# Patient Record
Sex: FEMALE | Race: WHITE | NOT HISPANIC OR LATINO | Employment: OTHER | URBAN - METROPOLITAN AREA
[De-identification: names, ages, dates, MRNs, and addresses within clinical notes are randomized per-mention and may not be internally consistent; named-entity substitution may affect disease eponyms.]

---

## 2017-01-02 DIAGNOSIS — J44.9 CHRONIC OBSTRUCTIVE PULMONARY DISEASE, UNSPECIFIED COPD TYPE (H): Primary | ICD-10-CM

## 2017-01-02 NOTE — TELEPHONE ENCOUNTER
fluticasone (FLOVENT DISKUS) 100 MCG/BLIST AEPB       Last Written Prescription Date: 11/3/16  Last Fill Quantity: 60, # refills: 1    Last Office Visit with G, P or [facility] prescribing provider:  12/23/16   Future Office Visit:       Date of Last Asthma Action Plan Letter:   There are no preventive care reminders to display for this patient.   Asthma Control Test: No flowsheet data found.    Date of Last Spirometry Test:   No results found. However, due to the size of the patient record, not all encounters were searched. Please check Results Review for a complete set of results.

## 2017-01-14 DIAGNOSIS — E11.22 TYPE 2 DIABETES MELLITUS WITH CHRONIC KIDNEY DISEASE, WITHOUT LONG-TERM CURRENT USE OF INSULIN, UNSPECIFIED CKD STAGE (H): Primary | ICD-10-CM

## 2017-01-14 NOTE — TELEPHONE ENCOUNTER
GLIMEPIRIDE 4 MG         Last Written Prescription Date: 3-30-16  Last Fill Quantity: 180, # refills: 2  Last Office Visit with [facility] or [facility] prescribing provider:  12-23-16        BP Readings from Last 3 Encounters:   12/23/16 139/75   12/14/16 130/74   11/19/16 130/74     MICROL       57   1/27/2016  No results found for this basename: microalbumin  CREATININE   Date Value Ref Range Status   11/18/2016 1.25* 0.52 - 1.04 mg/dL Final   ]  GFR ESTIMATE   Date Value Ref Range Status   11/18/2016 42* >60 mL/min/1.7m2 Final     Comment:     Non  GFR Calc   10/27/2016 48* >60 mL/min/1.7m2 Final     Comment:     Non  GFR Calc   09/19/2016 46* >60 mL/min/1.7m2 Final     Comment:     Non  GFR Calc     GFR ESTIMATE IF BLACK   Date Value Ref Range Status   11/18/2016 51* >60 mL/min/1.7m2 Final     Comment:      GFR Calc   10/27/2016 58* >60 mL/min/1.7m2 Final     Comment:      GFR Calc   09/19/2016 56* >60 mL/min/1.7m2 Final     Comment:      GFR Calc     CHOL      180   3/30/2016  HDL       55   3/30/2016  LDL       89   3/30/2016  LDL       95   5/7/2013  TRIG      180   3/30/2016  CHOLHDLRATIO      3.2   5/5/2015  AST       18   7/26/2016  ALT       28   7/26/2016  A1C      7.3   12/23/2016  A1C      8.8   8/10/2016  A1C      7.9   4/5/2016  A1C      9.1   12/21/2015  A1C      8.0   7/7/2015  POTASSIUM   Date Value Ref Range Status   11/18/2016 3.8 3.4 - 5.3 mmol/L Final

## 2017-01-16 RX ORDER — GLIMEPIRIDE 4 MG/1
4 TABLET ORAL 2 TIMES DAILY WITH MEALS
Qty: 180 TABLET | Refills: 1 | Status: SHIPPED | OUTPATIENT
Start: 2017-01-16 | End: 2017-07-13

## 2017-01-16 NOTE — TELEPHONE ENCOUNTER
Last script I can find is 2mg once daily #90. This would have run out. Please clarify with patient what she has been taking, who prescribed it and when it was prescribed. I did refill medication as written, but patient may need advised to change this if it does not represent the proper dose for her.    Jc Martin MD  Family Medicine Physician

## 2017-01-16 NOTE — TELEPHONE ENCOUNTER
This is the dose that pt has been on - see 2/17/16  Last office visit for diabetes 12/16  Opal To RN

## 2017-01-16 NOTE — TELEPHONE ENCOUNTER
Routing refill request to provider for review/approval because:  Labs out of range:  Abnormal creatinine  Labs not current:  microalbumin  Patient needs to be seen because:  Over due for 6 month check

## 2017-01-20 ENCOUNTER — OFFICE VISIT (OUTPATIENT)
Dept: SURGERY | Facility: CLINIC | Age: 72
End: 2017-01-20

## 2017-01-20 VITALS
BODY MASS INDEX: 45.99 KG/M2 | HEIGHT: 67 IN | OXYGEN SATURATION: 92 % | HEART RATE: 86 BPM | WEIGHT: 293 LBS | DIASTOLIC BLOOD PRESSURE: 72 MMHG | SYSTOLIC BLOOD PRESSURE: 158 MMHG

## 2017-01-20 DIAGNOSIS — F07.81 POST CONCUSSION SYNDROME: Primary | ICD-10-CM

## 2017-01-20 ASSESSMENT — ANXIETY QUESTIONNAIRES
7. FEELING AFRAID AS IF SOMETHING AWFUL MIGHT HAPPEN: SEVERAL DAYS
5. BEING SO RESTLESS THAT IT IS HARD TO SIT STILL: NOT AT ALL
2. NOT BEING ABLE TO STOP OR CONTROL WORRYING: MORE THAN HALF THE DAYS
6. BECOMING EASILY ANNOYED OR IRRITABLE: MORE THAN HALF THE DAYS
IF YOU CHECKED OFF ANY PROBLEMS ON THIS QUESTIONNAIRE, HOW DIFFICULT HAVE THESE PROBLEMS MADE IT FOR YOU TO DO YOUR WORK, TAKE CARE OF THINGS AT HOME, OR GET ALONG WITH OTHER PEOPLE: VERY DIFFICULT
GAD7 TOTAL SCORE: 10
3. WORRYING TOO MUCH ABOUT DIFFERENT THINGS: NEARLY EVERY DAY
1. FEELING NERVOUS, ANXIOUS, OR ON EDGE: SEVERAL DAYS

## 2017-01-20 ASSESSMENT — PATIENT HEALTH QUESTIONNAIRE - PHQ9: 5. POOR APPETITE OR OVEREATING: SEVERAL DAYS

## 2017-01-20 ASSESSMENT — PAIN SCALES - GENERAL: PAINLEVEL: SEVERE PAIN (7)
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
## 2017-01-20 NOTE — MR AVS SNAPSHOT
After Visit Summary   1/20/2017    Mariel Ribeiro    MRN: [id number]           Patient Information     Date Of Birth          1945        Visit Information        Provider Department      1/20/2017 8:00 AM Jose aMnuel Razo NP General Surgery        Today's Diagnoses     Post concussion syndrome    -  1       Care Instructions    GENERAL ADVICE  ~~ Avoid activities that trigger your symptoms.  Stop your activity  as soon as you feel the symptoms coming on, or earlier if you feel your symptoms coming on.  ~~ Do not try to push through symptoms or they may get worse or take longer to go away.  ~~ Allow yourself more time for activity   ~~ Drink plenty of water throughout the day (8-10 glasses per day)  ~~ Write things down.  At home, at work, whenever there is something that you should remember, even it is simple.    SLEEP  ~~ You need to sleep.   ~~ If your headaches are keeping you awake take your ibuprofen and tylenol right before bedtime~  ~~ Attempt for 8 hours of sleep a night. If you are having issues sleeping at night, avoid napping during the day.  ~~ Rest (eyes closed, dark room)  as frequently as needed to let your symptoms go away      HEADACHE  ~ Take your tylenol (1000 mg) three times a day for now, do not wait until you have a headache~  ~ Take your ibuprofen (400 mg) three times a day now, do not wait until you have a headache (take with food)~  Light sensitivities:   ~~ Avoid florescent lighting when possible  ~~ Always wear sunglasses outside and even wear them indoors if it is helpful.  ~~ No night driving  Sound sensitivities:  ~ Avoid crowded areas  ~ Avoid multiple conversations at the same time around you  ~ Avoid loud music or loud sounds    NECK PAIN  ~ Ice or Heat are good~  ~ Massage is ok if it doesn't trigger more symptoms~  ~ Gentle stretches are good    DIZZINESS  ~ Keep a chair at the side of the bed to help get steady prior to getting up and going to the  bathroom  ~ No climbing heights or using ladders or stepstools.    FATIGUE  ~ Go on walks, clean the house, gentle activities, but when you get fatigued, stop and rest.     ANXIETY OR MOOD SWINGS:  ~~ If you are irritable or anxious, take a break in a quiet room.                             Follow-ups after your visit        Additional Services     CONCUSSION  REFERRAL       [facility] is referring you to the Concussion  service at [facility].      The  Representative will assist you in the coordination of your concussion care as prescribed by your physician.    The  Representative will contact you within one business day, or you may contact the  Representative at [phone].    Referral Options:  Rehab Services: Occupational Therapy, Evaluate and Treat  Physical therapy eval and treat  Non sports related- follow up 4 weeks.     Coverage of these services are subject to the terms and limitations of your health insurance plan.  Please call member services at your health plan with any benefit or coverage questions.     If X-rays, CT or MRI's have been performed, please contact the facility where they were done, to arrange for  prior to your scheduled appointment.  Please bring this referral request to your appointment and present it to your specialist.            NEUROLOGY ADULT REFERRAL       Your provider has referred you to: [facility]: Neurology Clinic - [place] [phone]   [email or web address]  Neuro ophthalmology     Reason for Referral: Consult    Please be aware that coverage of these services is subject to the terms and limitations of your health insurance plan.  Call member services at your health plan with any benefit or coverage questions.      Please bring the following with you to your appointment:    (1) Any X-Rays, CTs or MRIs which have been performed.  Contact the facility  "where they were done to arrange for  prior to your scheduled appointment.    (2) List of current medications  (3) This referral request   (4) Any documents/labs given to you for this referral                  Who to contact     Please call your clinic at [phone] to:    Ask questions about your health    Make or cancel appointments    Discuss your medicines    Learn about your test results    Speak to your doctor   If you have compliments or concerns about an experience at your clinic, or if you wish to file a complaint, please contact [facility] Patient Relations at [phone] or email us at [email or web address]         Additional Information About Your Visit        [employer] Information     [employer] gives you secure access to your electronic health record. If you see a primary care provider, you can also send messages to your care team and make appointments. If you have questions, please call your primary care clinic.  If you do not have a primary care provider, please call [phone] and they will assist you.      [employer] is an electronic gateway that provides easy, online access to your medical records. With [employer], you can request a clinic appointment, read your test results, renew a prescription or communicate with your care team.     To access your existing account, please contact your [facility] or call [phone] for assistance.        Care EveryWhere ID     This is your Care EveryWhere ID. This could be used by other organizations to access your [place] medical records  [id number]        Your Vitals Were     Pulse Height BMI (Body Mass Index) Pulse Oximetry          86 1.689 m (5' 6.5\") 49.61 kg/m2 92%         Blood Pressure from Last 3 Encounters:   01/20/17 158/72   12/23/16 139/75   12/14/16 130/74    Weight from Last 3 Encounters:   01/20/17 141.522 kg (312 lb)   12/23/16 142.429 kg (314 lb)   12/14/16 142.883 kg " (315 lb)              We Performed the Following     CONCUSSION  REFERRAL     NEUROLOGY ADULT REFERRAL          Today's Medication Changes          These changes are accurate as of: 1/20/17  9:08 AM.  If you have any questions, ask your nurse or doctor.               These medicines have changed or have updated prescriptions.        Dose/Directions    polyethylene glycol powder   Commonly known as:  MIRALAX   This may have changed:    - how much to take  - how to take this  - when to take this  - reasons to take this  - additional instructions   Used for:  Drug-induced constipation        Take 17 grams by mouth once to twice daily.   Quantity:  510 g   Refills:  1                Primary Care Provider Office Phone # Fax #    Rafaela William [phone]       [facility] [address]  [place] [address]        Thank you!     Thank you for choosing GENERAL SURGERY  for your care. Our goal is always to provide you with excellent care. Hearing back from our patients is one way we can continue to improve our services. Please take a few minutes to complete the written survey that you may receive in the mail after your visit with us. Thank you!             Your Updated Medication List - Protect others around you: Learn how to safely use, store and throw away your medicines at www.disposemymeds.org.          This list is accurate as of: 1/20/17  9:08 AM.  Always use your most recent med list.                   Brand Name Dispense Instructions for use    albuterol 108 (90 BASE) MCG/ACT Inhaler    albuterol    1 Inhaler    INHALE 1 TO 2 PUFFS EVERY 4 TO 6 HOURS AS NEEDED       aspirin  MG EC tablet     30 tablet    Take 1 tablet by mouth daily.       atorvastatin 40 MG tablet    LIPITOR    90 tablet    Take 1 tablet (40 mg) by mouth daily       blood glucose monitoring test strip    no brand specified    200 each    1 strip by In Vitro route 2 times daily Strips per  patient's preference       EPINEPHrine 0.3 MG/0.3ML injection     0.6 mL    Inject 0.3 mLs (0.3 mg) into the muscle once as needed for anaphylaxis       escitalopram 20 MG tablet    LEXAPRO    90 tablet    Take 1 tablet (20 mg) by mouth every morning       febuxostat 40 MG Tabs tablet    ULORIC    90 tablet    Take 1 tablet (40 mg) by mouth every evening       fluticasone 100 MCG/BLIST Aepb    FLOVENT DISKUS    60 Inhaler    Inhale 1 puff into the lungs every 12 hours       folic acid 800 MCG Tabs      Take 800 mcg by mouth daily.       furosemide 40 MG tablet    LASIX    120 tablet    Take 2 tablets (80 mg) by mouth 2 times daily       glimepiride 4 MG tablet    AMARYL    180 tablet    Take 1 tablet (4 mg) by mouth 2 times daily (with meals)       GUAIFENESIN  MG Tbcr     30    Take 600 mg by mouth twice daily       levothyroxine 200 MCG tablet    SYNTHROID/LEVOTHROID    90 tablet    Take 1 tablet (200 mcg) by mouth every morning Take one tablet daily *WINSOME*       LIFESCAN FINEPOINT LANCETS Misc     100 each    Use to test blood sugars 2 times daily or as directed.       medroxyPROGESTERone 10 MG tablet    PROVERA    90 tablet    Take 1 tablet (10 mg) by mouth daily       metFORMIN 500 MG 24 hr tablet    GLUCOPHAGE-XR    360 tablet    2 tabs after breakfast and supper       Miconazole Nitrate 2 % Powd     100 g    Apply to rash on chest three times daily.  Continue for one week after the rash has resolved.       MORPHINE SULFATE PO      Take 15 mg by mouth 3 times daily       niacin 500 MG CR tablet    NIASPAN    180 tablet    Take 1 tablet (500 mg) by mouth 2 times daily       nitroglycerin 0.4 MG sublingual tablet    NITROSTAT    25 tablet    Place 1 tablet (0.4 mg) under the tongue every 5 minutes as needed for chest pain As needed for chest pain.       ONE TOUCH ULTRA (DEVICES) Misc     1    test blood sugar BID       polyethylene glycol powder    MIRALAX    510 g    Take 17 grams by mouth once to twice  daily.       potassium chloride SA 10 MEQ CR tablet    K-DUR/KLOR-CON M    120 tablet    Take 2 tablets (20 mEq) by mouth 2 times daily       pregabalin 100 MG capsule    LYRICA    180 capsule    Take 1 capsule (100 mg) by mouth 2 times daily       senna-docusate 8.6-50 MG per tablet    SENOKOT-S;PERICOLACE    60 tablet    Take 1-2 tablets by mouth 2 times daily as needed for constipation       tiZANidine 4 MG capsule    ZANAFLEX    90 capsule    1 TABLET DAILY at bed time       traZODone 50 MG tablet    DESYREL    90 tablet    Take 3 tablets (150 mg) by mouth At Bedtime       vitamin D3 5000 UNIT/ML Liqd      Take 10,000 Units by mouth daily

## 2017-01-20 NOTE — NURSING NOTE
"Chief Complaint   Patient presents with     Consult       Filed Vitals:    01/20/17 0813   BP: 158/72   Pulse: 86   Height: 1.689 m (5' 6.5\")   Weight: 141.522 kg (312 lb)   SpO2: 92%       Body mass index is 49.61 kg/(m^2).                              "
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
## 2017-01-20 NOTE — PATIENT INSTRUCTIONS
GENERAL ADVICE  ~~ Avoid activities that trigger your symptoms.  Stop your activity  as soon as you feel the symptoms coming on, or earlier if you feel your symptoms coming on.  ~~ Do not try to push through symptoms or they may get worse or take longer to go away.  ~~ Allow yourself more time for activity   ~~ Drink plenty of water throughout the day (8-10 glasses per day)  ~~ Write things down.  At home, at work, whenever there is something that you should remember, even it is simple.    SLEEP  ~~ You need to sleep.   ~~ If your headaches are keeping you awake take your ibuprofen and tylenol right before bedtime~  ~~ Attempt for 8 hours of sleep a night. If you are having issues sleeping at night, avoid napping during the day.  ~~ Rest (eyes closed, dark room)  as frequently as needed to let your symptoms go away      HEADACHE  ~ Take your tylenol (1000 mg) three times a day for now, do not wait until you have a headache~  ~ Take your ibuprofen (400 mg) three times a day now, do not wait until you have a headache (take with food)~  Light sensitivities:   ~~ Avoid florescent lighting when possible  ~~ Always wear sunglasses outside and even wear them indoors if it is helpful.  ~~ No night driving  Sound sensitivities:  ~ Avoid crowded areas  ~ Avoid multiple conversations at the same time around you  ~ Avoid loud music or loud sounds    NECK PAIN  ~ Ice or Heat are good~  ~ Massage is ok if it doesn't trigger more symptoms~  ~ Gentle stretches are good    DIZZINESS  ~ Keep a chair at the side of the bed to help get steady prior to getting up and going to the bathroom  ~ No climbing heights or using ladders or stepstools.    FATIGUE  ~ Go on walks, clean the house, gentle activities, but when you get fatigued, stop and rest.     ANXIETY OR MOOD SWINGS:  ~~ If you are irritable or anxious, take a break in a quiet room.
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
## 2017-01-21 ASSESSMENT — ANXIETY QUESTIONNAIRES: GAD7 TOTAL SCORE: 10

## 2017-01-21 ASSESSMENT — PATIENT HEALTH QUESTIONNAIRE - PHQ9: SUM OF ALL RESPONSES TO PHQ QUESTIONS 1-9: 21

## 2017-01-24 DIAGNOSIS — G47.00 INSOMNIA, UNSPECIFIED: Primary | ICD-10-CM

## 2017-01-24 NOTE — TELEPHONE ENCOUNTER
TRAZODONE 50 MG       Last Written Prescription Date: 5-16-16  Last Fill Quantity: 90; # refills: 7  Last Office Visit with [facility], [facility] or [facility] prescribing provider:  12-23-16        Last PHQ-9 score on record=   PHQ-9 SCORE 1/20/2017   Total Score -   Total Score MyChart -   Total Score 21       AST       18   7/26/2016  ALT       28   7/26/2016

## 2017-01-25 RX ORDER — TRAZODONE HYDROCHLORIDE 50 MG/1
150 TABLET, FILM COATED ORAL AT BEDTIME
Qty: 90 TABLET | Refills: 7 | Status: SHIPPED | OUTPATIENT
Start: 2017-01-25 | End: 2017-11-15

## 2017-01-30 ENCOUNTER — HOSPITAL ENCOUNTER (OUTPATIENT)
Dept: PHYSICAL THERAPY | Facility: CLINIC | Age: 72
Setting detail: THERAPIES SERIES
End: 2017-01-30
Attending: NURSE PRACTITIONER
Payer: MEDICARE

## 2017-01-30 ENCOUNTER — HOSPITAL ENCOUNTER (OUTPATIENT)
Dept: OCCUPATIONAL THERAPY | Facility: CLINIC | Age: 72
Setting detail: THERAPIES SERIES
End: 2017-01-30
Attending: NURSE PRACTITIONER
Payer: MEDICARE

## 2017-01-30 DIAGNOSIS — F07.81 POST CONCUSSION SYNDROME: Primary | ICD-10-CM

## 2017-01-30 PROCEDURE — G8978 MOBILITY CURRENT STATUS: HCPCS | Mod: GP,CL | Performed by: PHYSICAL THERAPIST

## 2017-01-30 PROCEDURE — 97163 PT EVAL HIGH COMPLEX 45 MIN: CPT | Mod: GP | Performed by: PHYSICAL THERAPIST

## 2017-01-30 PROCEDURE — G8987 SELF CARE CURRENT STATUS: HCPCS | Mod: GO,CK | Performed by: OCCUPATIONAL THERAPIST

## 2017-01-30 PROCEDURE — G8988 SELF CARE GOAL STATUS: HCPCS | Mod: GO,CJ | Performed by: OCCUPATIONAL THERAPIST

## 2017-01-30 PROCEDURE — 97535 SELF CARE MNGMENT TRAINING: CPT | Mod: GO | Performed by: OCCUPATIONAL THERAPIST

## 2017-01-30 PROCEDURE — 40000767 ZZHC STATISTIC PT CONCUSSION VISIT: Performed by: PHYSICAL THERAPIST

## 2017-01-30 PROCEDURE — 97167 OT EVAL HIGH COMPLEX 60 MIN: CPT | Mod: GO,59 | Performed by: OCCUPATIONAL THERAPIST

## 2017-01-30 PROCEDURE — 40000768 ZZHC STATISTIC OT CONCUSSION VISIT: Performed by: OCCUPATIONAL THERAPIST

## 2017-01-30 PROCEDURE — G8979 MOBILITY GOAL STATUS: HCPCS | Mod: GP,CK | Performed by: PHYSICAL THERAPIST

## 2017-01-30 ASSESSMENT — ACTIVITIES OF DAILY LIVING (ADL)
IADL_QUICK_ADDS: MEAL PLANNING/PREPARATION;HOME/FINANCIAL/MANAGEMENT;COMMUNICATION/COMPUTER USE;COMMUNITY MOBILITY;CARE OF OTHERS
CARE_OF_OTHERS: DOG

## 2017-01-30 NOTE — DISCHARGE INSTRUCTIONS
Call your primary care doctor to check out your stomach.    Call your orthopedist to check out your right knee replacement      Balance homework  1. DON'T BEND OVER - USE YOUR REACHER.    2. Walk in your house with your cane for 3 mins, 2x per day.

## 2017-01-30 NOTE — IP AVS SNAPSHOT
MRN:[id number]                      After Visit Summary   1/30/2017    Mariel Ribeiro    MRN: [id number]           Visit Information        Provider Department      1/30/2017  2:30 PM Liudmila Harper, PT [facility], [place], Physical Therapy - Outpatient          Further instructions from your care team       Call your primary care doctor to check out your stomach.    Call your orthopedist to check out your right knee replacement      Balance homework  1. DON'T BEND OVER - USE YOUR REACHER.    2. Walk in your house with your cane for 3 mins, 2x per day.            [employer] Information     [employer] gives you secure access to your electronic health record. If you see a primary care provider, you can also send messages to your care team and make appointments. If you have questions, please call your primary care clinic.  If you do not have a primary care provider, please call [phone] and they will assist you.        Care EveryWhere ID     This is your Care EveryWhere ID. This could be used by other organizations to access your [place] medical records  [id number]

## 2017-02-01 NOTE — PROGRESS NOTES
[facility]          OUTPATIENT OCCUPATIONAL THERAPY  EVALUATION  PLAN OF TREATMENT FOR OUTPATIENT REHABILITATION  (COMPLETE FOR INITIAL CLAIMS ONLY)  Patient's Last Name, First Name, M.I.  YOB: 1945  Mariel Ribeiro                        Provider's Name  [facility] Medical Record No.  [id number]                               Onset Date:     01/20/17 (order)   Start of Care Date:     01/30/17   Type:     ___PT   _X_OT   ___SLP Medical Diagnosis:     Post concussion syndrome                          OT Diagnosis:     concussion symptom severity Visits from SOC:  1   _________________________________________________________________________________  Plan of Treatment/Functional Goals:  ADL training, Cognitive skills, ROM, Self care/Home management, Therapeutic activities    Goals  Goal Identifier: Symptom mgmt  Goal Description: Patient will verbalize 2-3 strategies to decrease concussion symptom assessment score by 20 points in order to return to her ability to carry out IADL tasks driving, home cares, and ADLS, etc...  Target Date: 05/01/17     Goal Identifier: Memory  Goal Description: Patient will demonstrate improved memory skills by completing short-term memory tasks in occupational therapy with 85% accuracy using compensatory strategies prn.   Target Date: 05/01/17     Goal Identifier: Dynavision  Goal Description: Patient will demonstrate WFLs UE motor and visual reaction time for return to independent community mobility (crossing the street, driving, etc.) by scoring WNLs on all modes of the Dynavision.   Target Date: 05/01/17    Therapy Frequency: up to 10 sessions as needed     Predicted Duration of Therapy Intervention (days/wks): 5/1/17  Yumiko Ayala, OTR/L, ATP       I CERTIFY THE NEED FOR THESE SERVICES FURNISHED UNDER        THIS PLAN OF TREATMENT AND WHILE UNDER  MY CARE     (Physician co-signature of this document indicates review and certification of the therapy plan).             Certification date from: 01/30/17, Certification date to: 05/01/17               Referring Physician: Jose Manuel Razo     Initial Assessment        See Epic Evaluation      Start Of Care Date: 01/30/17
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
## 2017-02-01 NOTE — PROGRESS NOTES
[facility]        OUTPATIENT PHYSICAL THERAPY FUNCTIONAL EVALUATION  PLAN OF TREATMENT FOR OUTPATIENT REHABILITATION  (COMPLETE FOR INITIAL CLAIMS ONLY)  Patient's Last Name, First Name, M.I.  YOB: 1945  Mariel Ribeiro     Provider's Name   [facility]   Medical Record No.  [id number]     Start of Care Date:  01/30/17   Onset Date:  11/11/16   Type:     _X__PT   ____OT  ____SLP Medical Diagnosis:  post concussion syndrome     PT Diagnosis:  post-concussive syndrome Visits from SOC:  1                              __________________________________________________________________________________  Plan of Treatment/Functional Goals:  balance training, neuromuscular re-education, ROM, strengthening, stretching, transfer training, visual perception, gait training           GOALS  TUG  Pt to demo low risk for falls as evidenced by TUG <13.5 secs with SEC.  04/29/17    25' walk  Pt demo improved efficiency and safety with gait via 25' walk <18.4 sec with SEC (20% improved).  04/29/17    Community mobility  Pt demo improved activity tolerance via ability to amb >500' with 4WW for safe community mobility to allow for basic shopping on her own.  04/29/17    HEP  Pt demo indep with HEP for balance, LE strength, endurance for reduction of concussion sx and ongoing wellness.  04/29/17    CSA  Pt demo improved concussion sx to allow for greater engagement in life via CSA <38/90.  04/29/17          Therapy Frequency:  2 times/Week (ideally - but likley limited to 1x per week due to transport)   Predicted Duration of Therapy Intervention:  90 day    Liudmila Harper, PT                                    I CERTIFY THE NEED FOR THESE SERVICES FURNISHED UNDER        THIS PLAN OF TREATMENT AND WHILE UNDER MY CARE     (Physician co-signature of this document indicates review and  certification of the therapy plan).                Certification Date From:  01/30/17   Certification Date To:  04/29/17    Referring Provider:  Jose Manuel Razo NP    Initial Assessment  See Epic Evaluation- Start of Care Date: 01/30/17

## 2017-02-10 ENCOUNTER — HOSPITAL ENCOUNTER (OUTPATIENT)
Dept: PHYSICAL THERAPY | Facility: CLINIC | Age: 72
Setting detail: THERAPIES SERIES
End: 2017-02-10
Attending: NURSE PRACTITIONER
Payer: MEDICARE

## 2017-02-10 DIAGNOSIS — E79.0 HYPERURICEMIA: Primary | ICD-10-CM

## 2017-02-10 PROCEDURE — 97110 THERAPEUTIC EXERCISES: CPT | Mod: GP | Performed by: PHYSICAL THERAPIST

## 2017-02-10 PROCEDURE — 97112 NEUROMUSCULAR REEDUCATION: CPT | Mod: GP | Performed by: PHYSICAL THERAPIST

## 2017-02-10 PROCEDURE — 40000767 ZZHC STATISTIC PT CONCUSSION VISIT: Performed by: PHYSICAL THERAPIST

## 2017-02-10 RX ORDER — FEBUXOSTAT 40 MG/1
40 TABLET, FILM COATED ORAL EVERY EVENING
Qty: 90 TABLET | Refills: 3 | Status: SHIPPED | OUTPATIENT
Start: 2017-02-10 | End: 2018-03-22

## 2017-02-10 NOTE — TELEPHONE ENCOUNTER
Routing refill request to provider for review/approval because:  Last creatinine labs were abnormal, no alt or ast done in the last year

## 2017-02-10 NOTE — TELEPHONE ENCOUNTER
ULORIC 40 MG       Last Written Prescription Date: 11-23-16  Last Fill Quantity: 90, # refills: 0  Last Office Visit with [facility], [facility] or [facility] prescribing provider:  12-23-16        URIC ACID   Date Value Ref Range Status   05/07/2013 6.0 2.5 - 7.5 mg/dL Final   ]  CREATININE   Date Value Ref Range Status   11/18/2016 1.25* 0.52 - 1.04 mg/dL Final   ]  WBC      8.2   11/18/2016  RBC     4.71   11/18/2016  HGB     12.9   11/18/2016  HCT     39.9   11/18/2016  No components found with this name: mct  MCV       85   11/18/2016  MCH     27.4   11/18/2016  MCHC     32.3   11/18/2016  RDW     13.2   11/18/2016  PLT      134   11/18/2016  AST       18   7/26/2016  ALT       28   7/26/2016

## 2017-02-10 NOTE — DISCHARGE INSTRUCTIONS
Keep walking 2x per day - 4 mins      Standing at kitchen sink, chair behind you   - Stand feet as close together as you can while balancing without holding on.  - GOAL 30 secs, 3x

## 2017-02-10 NOTE — IP AVS SNAPSHOT
MRN:[id number]                      After Visit Summary   2/10/2017    Mariel Ribeiro    MRN: [id number]           Visit Information        Provider Department      2/10/2017  2:15 PM Liudmila Harper, PT [facility], [place], Physical Therapy - Outpatient        Your next 10 appointments already scheduled     Feb 16, 2017  2:15 PM   Treatment 45 with Liudmila Harper, PT   [facility], [place], Physical Therapy - Outpatient ([facility])    [address]  [place] [address]   [phone]            Feb 16, 2017  3:00 PM   Treatment 60 with Yumiko Ayala, OT   [facility], [place], Occupational Therapy - Outpatient ([facility])    [address]  [place] [address]   [phone]            Feb 23, 2017  1:00 PM   Treatment 45 with Liudmila Harper, PT   [facility], [place], Physical Therapy - Outpatient ([facility])    [address]  [place] [address]   [phone]            Mar 02, 2017  1:00 PM   Treatment 45 with Liudmila Harper, PT   [facility], [place], Physical Therapy - Outpatient ([facility])    [address]  [place] [address]   [phone]            Mar 02, 2017  1:45 PM   Treatment 60 with Yumiko Ayala, OT   [facility], [place], Occupational Therapy - Outpatient ([facility])    [address]  [place] [address]   [phone]            Mar 09, 2017  1:00 PM   Treatment 45 with Liudmila Harper, PT   [facility], [place], Physical Therapy - Outpatient ([facility])    [address]  [place] [address]   [phone]            Mar 16, 2017  2:00 PM   Treatment 60 with Yumiko Ayala, OT   [facility], [place], Occupational Therapy - Outpatient ([facility])    [address]  [place] [address]   [phone]            Mar 16, 2017  3:00 PM   Treatment 45 with Liudmila Harper PT   [facility], [place], Physical Therapy - Outpatient ([facility])    [address]  [place] [address]   [phone]                Further instructions from your care team       Keep walking 2x per day - 4 mins      Standing at kitchen sink, chair behind you   - Stand feet as close together as you can while balancing without holding on.  - GOAL 30 secs, 3x        MyChart Information     [employer] gives you secure access to your electronic health record. If you see a primary care provider, you can also send messages to your care team and make appointments. If you have questions, please call your primary care clinic.  If you do not have a primary care provider, please call [phone] and they will assist you.        Care EveryWhere ID     This is your Care EveryWhere ID. This could be used by other organizations to access your [place] medical records  [id number]

## 2017-02-16 ENCOUNTER — HOSPITAL ENCOUNTER (OUTPATIENT)
Dept: PHYSICAL THERAPY | Facility: CLINIC | Age: 72
Setting detail: THERAPIES SERIES
End: 2017-02-16
Attending: NURSE PRACTITIONER
Payer: MEDICARE

## 2017-02-16 ENCOUNTER — HOSPITAL ENCOUNTER (OUTPATIENT)
Dept: OCCUPATIONAL THERAPY | Facility: CLINIC | Age: 72
Setting detail: THERAPIES SERIES
End: 2017-02-16
Attending: NURSE PRACTITIONER
Payer: MEDICARE

## 2017-02-16 PROCEDURE — 97140 MANUAL THERAPY 1/> REGIONS: CPT | Mod: GP | Performed by: PHYSICAL THERAPIST

## 2017-02-16 PROCEDURE — 40000768 ZZHC STATISTIC OT CONCUSSION VISIT: Performed by: OCCUPATIONAL THERAPIST

## 2017-02-16 PROCEDURE — 40000767 ZZHC STATISTIC PT CONCUSSION VISIT: Performed by: PHYSICAL THERAPIST

## 2017-02-16 PROCEDURE — 97535 SELF CARE MNGMENT TRAINING: CPT | Mod: GO | Performed by: OCCUPATIONAL THERAPIST

## 2017-02-16 PROCEDURE — 97112 NEUROMUSCULAR REEDUCATION: CPT | Mod: GP | Performed by: PHYSICAL THERAPIST

## 2017-02-16 NOTE — IP AVS SNAPSHOT
MRN:[id number]                      After Visit Summary   2/16/2017    Mariel Ribeiro    MRN: [id number]           Visit Information        Provider Department      2/16/2017  2:15 PM Liudmila Harper, PT [facility], [place], Physical Therapy - Outpatient        Your next 10 appointments already scheduled     Feb 23, 2017  1:00 PM CST   Treatment 45 with Liudmila Harper, PT   [facility], [place], Physical Therapy - Outpatient ([facility])    [address]  [place] [address]   [phone]            Mar 02, 2017  1:00 PM CST   Treatment 45 with Liudmila Harper, PT   [facility], [place], Physical Therapy - Outpatient ([facility])    [address]  [place] [address]   [phone]            Mar 02, 2017  1:45 PM CST   Treatment 60 with Yumiko Ayala, OT   [facility], [place], Occupational Therapy - Outpatient ([facility])    [address]  [place] [address]   [phone]            Mar 09, 2017  1:00 PM CST   Treatment 45 with Liudmila Harper, PT   [facility], [place], Physical Therapy - Outpatient ([facility])    [address]  [place] [address]   [phone]            Mar 16, 2017  2:00 PM CDT   Treatment 60 with Yumiko Ayala, OT   [facility], [place], Occupational Therapy - Outpatient ([facility])    [address]  [place] [address]   [phone]            Mar 16, 2017  3:00 PM CDT   Treatment 45 with Liudmila Harper, PT   [facility], [place], Physical Therapy - Outpatient ([facility])    [address]  [place] [address]   [phone]                Further instructions from your care team       Make an appointment with primary care doctor about  your COPD.      Standing balance - get feet as close together as possible.    While laying down - neck range of motion - rotation left and right as pain free as possible.    Keep walking 3-4 mins as able with your breathing, 2x per day.    [employer] Information     [employer] gives you secure access to your electronic health record. If you see a primary care provider, you can also send messages to your care team and make appointments. If you have questions, please call your primary care clinic.  If you do not have a primary care provider, please call [phone] and they will assist you.        Care EveryWhere ID     This is your Care EveryWhere ID. This could be used by other organizations to access your [place] medical records  [id number]

## 2017-02-16 NOTE — DISCHARGE INSTRUCTIONS
Make an appointment with primary care doctor about your COPD.      Standing balance - get feet as close together as possible.    While laying down - neck range of motion - rotation left and right as pain free as possible.    Keep walking 3-4 mins as able with your breathing, 2x per day.

## 2017-02-17 DIAGNOSIS — E03.9 HYPOTHYROIDISM, UNSPECIFIED TYPE: ICD-10-CM

## 2017-02-17 RX ORDER — LEVOTHYROXINE SODIUM 200 UG/1
200 TABLET ORAL EVERY MORNING
Qty: 30 TABLET | Refills: 0 | Status: SHIPPED
Start: 2017-02-17 | End: 2017-03-10

## 2017-02-17 NOTE — TELEPHONE ENCOUNTER
Left message on answering machine that medication was approved and sent to pharmacy and that a lab only appointment must be scheduled prior to additional refills given.   Shayna Christine

## 2017-02-17 NOTE — TELEPHONE ENCOUNTER
Medication is being filled for 1 time refill only due to:  Patient needs labs TSH. Future labs ordered TSH.     Signed Prescriptions:                        Disp   Refills    levothyroxine (SYNTHROID/LEVOTHROID) 200 M*30 tab*0        Sig: Take 1 tablet (200 mcg) by mouth every morning Take           one tablet daily *WINSOME*  PLEASE SCHEDULE A ;AB           ONLY APPOINTMENT TO RECEIVE FUTURE REFILLS           [phone]  Authorizing Provider: INO JACOB  Ordering User: ABIMBOLA ANN      Routing to  Reception - please ask patient to schedule lab only for recheck of thyroid    Thank you,  Abimbola Ann RN

## 2017-02-17 NOTE — TELEPHONE ENCOUNTER
SYNTHROID 200 MCG     Last Written Prescription Date: 5-17-16  Last Quantity: 90, # refills: 2  Last Office Visit with [facility] or [facility] prescribing provider: 12-23-16        TSH   Date Value Ref Range Status   03/30/2016 1.06 0.40 - 4.00 mU/L Final

## 2017-02-23 ENCOUNTER — HOSPITAL ENCOUNTER (OUTPATIENT)
Dept: PHYSICAL THERAPY | Facility: CLINIC | Age: 72
Setting detail: THERAPIES SERIES
End: 2017-02-23
Attending: NURSE PRACTITIONER
Payer: MEDICARE

## 2017-02-23 PROCEDURE — 40000767 ZZHC STATISTIC PT CONCUSSION VISIT: Performed by: PHYSICAL THERAPIST

## 2017-02-23 PROCEDURE — 97530 THERAPEUTIC ACTIVITIES: CPT | Mod: GP | Performed by: PHYSICAL THERAPIST

## 2017-02-23 NOTE — DISCHARGE INSTRUCTIONS
Bring in your TENS machine to your next PT appt.      Make an appt with your primary care doctor ASAP about the chest pain on right side, as well as how you feel when you sit up from laying down.

## 2017-02-23 NOTE — IP AVS SNAPSHOT
MRN:[id number]                      After Visit Summary   2/23/2017    Mariel Ribeiro    MRN: [id number]           Visit Information        Provider Department      2/23/2017  1:00 PM Liudmila Harper, PT [facility], [place], Physical Therapy - Outpatient        Your next 10 appointments already scheduled     Mar 02, 2017  1:00 PM CST   Treatment 45 with Liudmila Harper, PT   [facility], [place], Physical Therapy - Outpatient ([facility])    [address]  [place] [address]   [phone]            Mar 02, 2017  1:45 PM CST   Treatment 60 with Yumiko Ayala, OT   [facility], [place], Occupational Therapy - Outpatient ([facility])    [address]  [place] [address]   [phone]            Mar 09, 2017  1:00 PM CST   Treatment 45 with Liudmila Harper, PT   [facility], [place], Physical Therapy - Outpatient ([facility])    [address]  [place] [address]   [phone]            Mar 16, 2017  2:00 PM CDT   Treatment 60 with Yumiko Ayala, OT   [facility], [place], Occupational Therapy - Outpatient ([facility])    [address]  [place] [address]   [phone]            Mar 16, 2017  3:00 PM CDT   Treatment 45 with Liudmila Harper, PT   [facility], [place], Physical Therapy - Outpatient ([facility])    [address]  [place] [address]   [phone]                Further instructions from your care team       Bring in your TENS machine to your next PT appt.      Make an appt with your primary care doctor ASAP about the chest pain on right side, as well as how you feel when you sit up from laying down.          [employer] Information     [employer] gives you secure access to your electronic health record. If you see  a primary care provider, you can also send messages to your care team and make appointments. If you have questions, please call your primary care clinic.  If you do not have a primary care provider, please call [phone] and they will assist you.        Care EveryWhere ID     This is your Care EveryWhere ID. This could be used by other organizations to access your [place] medical records  [id number]

## 2017-03-02 ENCOUNTER — HOSPITAL ENCOUNTER (OUTPATIENT)
Dept: OCCUPATIONAL THERAPY | Facility: CLINIC | Age: 72
Setting detail: THERAPIES SERIES
End: 2017-03-02
Attending: NURSE PRACTITIONER
Payer: MEDICARE

## 2017-03-02 ENCOUNTER — HOSPITAL ENCOUNTER (OUTPATIENT)
Dept: PHYSICAL THERAPY | Facility: CLINIC | Age: 72
Setting detail: THERAPIES SERIES
End: 2017-03-02
Attending: NURSE PRACTITIONER
Payer: MEDICARE

## 2017-03-02 PROCEDURE — 97112 NEUROMUSCULAR REEDUCATION: CPT | Mod: GP | Performed by: PHYSICAL THERAPIST

## 2017-03-02 PROCEDURE — 97535 SELF CARE MNGMENT TRAINING: CPT | Mod: GO,59 | Performed by: OCCUPATIONAL THERAPIST

## 2017-03-02 PROCEDURE — 97110 THERAPEUTIC EXERCISES: CPT | Mod: GP | Performed by: PHYSICAL THERAPIST

## 2017-03-02 PROCEDURE — 40000767 ZZHC STATISTIC PT CONCUSSION VISIT: Performed by: PHYSICAL THERAPIST

## 2017-03-02 PROCEDURE — 40000768 ZZHC STATISTIC OT CONCUSSION VISIT: Performed by: OCCUPATIONAL THERAPIST

## 2017-03-02 PROCEDURE — 97530 THERAPEUTIC ACTIVITIES: CPT | Mod: GP | Performed by: PHYSICAL THERAPIST

## 2017-03-08 ENCOUNTER — RADIANT APPOINTMENT (OUTPATIENT)
Dept: GENERAL RADIOLOGY | Facility: CLINIC | Age: 72
End: 2017-03-08
Attending: FAMILY MEDICINE
Payer: MEDICARE

## 2017-03-08 ENCOUNTER — OFFICE VISIT (OUTPATIENT)
Dept: FAMILY MEDICINE | Facility: CLINIC | Age: 72
End: 2017-03-08
Payer: MEDICARE

## 2017-03-08 VITALS
DIASTOLIC BLOOD PRESSURE: 65 MMHG | TEMPERATURE: 97.4 F | BODY MASS INDEX: 45.99 KG/M2 | RESPIRATION RATE: 16 BRPM | SYSTOLIC BLOOD PRESSURE: 133 MMHG | WEIGHT: 293 LBS | HEART RATE: 74 BPM | HEIGHT: 67 IN | OXYGEN SATURATION: 94 %

## 2017-03-08 DIAGNOSIS — I50.32 CHRONIC DIASTOLIC HEART FAILURE (H): ICD-10-CM

## 2017-03-08 DIAGNOSIS — G89.29 CHRONIC BILATERAL THORACIC BACK PAIN: ICD-10-CM

## 2017-03-08 DIAGNOSIS — M54.6 CHRONIC BILATERAL THORACIC BACK PAIN: ICD-10-CM

## 2017-03-08 DIAGNOSIS — E87.6 HYPOKALEMIA: ICD-10-CM

## 2017-03-08 DIAGNOSIS — E03.9 HYPOTHYROIDISM, UNSPECIFIED TYPE: ICD-10-CM

## 2017-03-08 DIAGNOSIS — I65.23 BILATERAL CAROTID ARTERY STENOSIS: ICD-10-CM

## 2017-03-08 DIAGNOSIS — I10 HYPERTENSION GOAL BP (BLOOD PRESSURE) < 130/80: ICD-10-CM

## 2017-03-08 DIAGNOSIS — E11.22 TYPE 2 DIABETES MELLITUS WITH CHRONIC KIDNEY DISEASE, WITHOUT LONG-TERM CURRENT USE OF INSULIN, UNSPECIFIED CKD STAGE (H): ICD-10-CM

## 2017-03-08 DIAGNOSIS — J44.1 CHRONIC OBSTRUCTIVE PULMONARY DISEASE WITH ACUTE EXACERBATION (H): ICD-10-CM

## 2017-03-08 DIAGNOSIS — R22.1 MASS OF NECK: ICD-10-CM

## 2017-03-08 DIAGNOSIS — N63.20 LEFT BREAST MASS: ICD-10-CM

## 2017-03-08 DIAGNOSIS — F33.41 RECURRENT MAJOR DEPRESSIVE DISORDER, IN PARTIAL REMISSION (H): Primary | ICD-10-CM

## 2017-03-08 DIAGNOSIS — K59.03 DRUG-INDUCED CONSTIPATION: ICD-10-CM

## 2017-03-08 LAB — HBA1C MFR BLD: 7.2 % (ref 4.3–6)

## 2017-03-08 PROCEDURE — 99215 OFFICE O/P EST HI 40 MIN: CPT | Performed by: FAMILY MEDICINE

## 2017-03-08 PROCEDURE — 80061 LIPID PANEL: CPT | Performed by: FAMILY MEDICINE

## 2017-03-08 PROCEDURE — 82043 UR ALBUMIN QUANTITATIVE: CPT | Performed by: FAMILY MEDICINE

## 2017-03-08 PROCEDURE — 84439 ASSAY OF FREE THYROXINE: CPT | Performed by: FAMILY MEDICINE

## 2017-03-08 PROCEDURE — 84443 ASSAY THYROID STIM HORMONE: CPT | Performed by: FAMILY MEDICINE

## 2017-03-08 PROCEDURE — 83036 HEMOGLOBIN GLYCOSYLATED A1C: CPT | Performed by: FAMILY MEDICINE

## 2017-03-08 PROCEDURE — 72070 X-RAY EXAM THORAC SPINE 2VWS: CPT

## 2017-03-08 PROCEDURE — 80048 BASIC METABOLIC PNL TOTAL CA: CPT | Performed by: FAMILY MEDICINE

## 2017-03-08 PROCEDURE — 36415 COLL VENOUS BLD VENIPUNCTURE: CPT | Performed by: FAMILY MEDICINE

## 2017-03-08 PROCEDURE — 71020 XR CHEST 2 VW: CPT

## 2017-03-08 RX ORDER — ALBUTEROL SULFATE 90 UG/1
AEROSOL, METERED RESPIRATORY (INHALATION)
Qty: 1 INHALER | Status: ON HOLD | OUTPATIENT
Start: 2017-03-08 | End: 2018-07-22

## 2017-03-08 RX ORDER — DOXYCYCLINE 100 MG/1
100 CAPSULE ORAL 2 TIMES DAILY
Qty: 20 CAPSULE | Refills: 0 | Status: ON HOLD | OUTPATIENT
Start: 2017-03-08 | End: 2017-08-08

## 2017-03-08 RX ORDER — PREDNISONE 20 MG/1
20 TABLET ORAL DAILY
Qty: 5 TABLET | Refills: 0 | Status: ON HOLD | OUTPATIENT
Start: 2017-03-08 | End: 2017-05-18

## 2017-03-08 NOTE — LETTER
My Depression Action Plan  Name: Mariel Ribeiro   Date of Birth 1945  Date: 3/8/2017    My doctor: Rafaela William   My clinic: [address] [id number] [phone]          GREEN    ZONE   Good Control    What it looks like:     Things are going generally well. You have normal up s and down s. You may even feel depressed from time to time, but bad moods usually last less than a day.   What you need to do:  1. Continue to care for yourself (see self care plan)  2. Check your depression survival kit and update it as needed  3. Follow your physician s recommendations including any medication.  4. Do not stop taking medication unless you consult with your physician first.           YELLOW         ZONE Getting Worse    What it looks like:     Depression is starting to interfere with your life.     It may be hard to get out of bed; you may be starting to isolate yourself from others.    Symptoms of depression are starting to last most all day and this has happened for several days.     You may have suicidal thoughts but they are not constant.   What you need to do:     1. Call your care team, your response to treatment will improve if you keep your care team informed of your progress. Yellow periods are signs an adjustment may need to be made.     2. Continue your self-care, even if you have to fake it!    3. Talk to someone in your support network    4. Open up your depression survival kit           RED    ZONE Medical Alert - Get Help    What it looks like:     Depression is seriously interfering with your life.     You may experience these or other symptoms: You can t get out of bed most days, can t work or engage in other necessary activities, you have trouble taking care of basic hygiene, or basic responsibilities, thoughts of suicide or death that will not go away, self-injurious behavior.     What you need to do:  1. Call your care team and  request a same-day appointment. If they are not available (weekends or after hours) call your local crisis line, emergency room or 911.      Electronically signed by: Annabelle Hernandez, March 8, 2017    Depression Self Care Plan / Survival Kit    Self-Care for Depression  Here s the deal. Your body and mind are really not as separate as most people think.  What you do and think affects how you feel and how you feel influences what you do and think. This means if you do things that people who feel good do, it will help you feel better.  Sometimes this is all it takes.  There is also a place for medication and therapy depending on how severe your depression is, so be sure to consult with your medical provider and/ or Behavioral Health Consultant if your symptoms are worsening or not improving.     In order to better manage my stress, I will:    Exercise  Get some form of exercise, every day. This will help reduce pain and release endorphins, the  feel good  chemicals in your brain. This is almost as good as taking antidepressants!  This is not the same as joining a gym and then never going! (they count on that by the way ) It can be as simple as just going for a walk or doing some gardening, anything that will get you moving.      Hygiene   Maintain good hygiene (Get out of bed in the morning, Make your bed, Brush your teeth, Take a shower, and Get dressed like you were going to work, even if you are unemployed).  If your clothes don't fit try to get ones that do.    Diet  I will strive to eat foods that are good for me, drink plenty of water, and avoid excessive sugar, caffeine, alcohol, and other mood-altering substances.  Some foods that are helpful in depression are: complex carbohydrates, B vitamins, flaxseed, fish or fish oil, fresh fruits and vegetables.    Psychotherapy  I agree to participate in Individual Therapy (if recommended).    Medication  If prescribed medications, I agree to take them.  Missing doses can  result in serious side effects.  I understand that drinking alcohol, or other illicit drug use, may cause potential side effects.  I will not stop my medication abruptly without first discussing it with my provider.    Staying Connected With Others  I will stay in touch with my friends, family members, and my primary care provider/team.    Use your imagination  Be creative.  We all have a creative side; it doesn t matter if it s oil painting, sand castles, or mud pies! This will also kick up the endorphins.    Witness Beauty  (AKA stop and smell the roses) Take a look outside, even in mid-winter. Notice colors, textures. Watch the squirrels and birds.     Service to others  Be of service to others.  There is always someone else in need.  By helping others we can  get out of ourselves  and remember the really important things.  This also provides opportunities for practicing all the other parts of the program.    Humor  Laugh and be silly!  Adjust your TV habits for less news and crime-drama and more comedy.    Control your stress  Try breathing deep, massage therapy, biofeedback, and meditation. Find time to relax each day.     My support system    Clinic Contact:  Phone number:    Contact 1:  Phone number:    Contact 2:  Phone number:    [employer]/:  Phone number:    Therapist:  Phone number:    Local crisis center:    Phone number:    Other community support:  Phone number:

## 2017-03-08 NOTE — NURSING NOTE
"Chief Complaint   Patient presents with     Pt requesting x-ray     URI       Initial /65 (Patient Position: Chair, Cuff Size: Adult Regular)  Pulse 74  Temp 97.4  F (36.3  C) (Oral)  Resp 16  Ht 5' 6.5\" (1.689 m)  Wt (!) 308 lb (139.7 kg)  SpO2 94%  BMI 48.97 kg/m2 Estimated body mass index is 48.97 kg/(m^2) as calculated from the following:    Height as of this encounter: 5' 6.5\" (1.689 m).    Weight as of this encounter: 308 lb (139.7 kg).  Medication Reconciliation: complete     Annabelle Hernandez MA      "

## 2017-03-08 NOTE — LETTER
[facility]   [address], [place]  [address]  [phone]      March 13, 2017      Mariel Ribeiro  [address] [place] [address]              Dear MsAlon Gema,    Your thyroid tests were a little abnormal this time, indicating that the dose of your medication is too high for now.  It's possible that, with your recent weight loss, you need less medication.  I will have our triage nurses be in touch with you about this, and I will send in a slightly lower dose of the thyroid medication to your pharmacy.  We should recheck your level again in 4-6 weeks after starting the new dose .   Your urine test this time improved, in that there was no abnormal protein leaking from the kidneys, which is very good.   Your A1c for diabetes remains excellent, showing that you continue to have very good blood sugar control.  Your cholesterol is doing fine.  Your kidney function is a little abnormal, but no new changes.      Results for orders placed or performed in visit on 03/08/17   TSH with free T4 reflex   Result Value Ref Range    TSH 0.12 (L) 0.40 - 4.00 mU/L   Hemoglobin A1c   Result Value Ref Range    Hemoglobin A1C 7.2 (H) 4.3 - 6.0 %   Lipid Profile   Result Value Ref Range    Cholesterol 186 <200 mg/dL    Triglycerides 237 (H) <150 mg/dL    HDL Cholesterol 50 >49 mg/dL    LDL Cholesterol Calculated 89 <100 mg/dL    Non HDL Cholesterol 136 (H) <130 mg/dL   Albumin Random Urine Quantitative   Result Value Ref Range    Creatinine Urine 104 mg/dL    Albumin Urine mg/L 20 mg/L    Albumin Urine mg/g Cr 19.33 0 - 25 mg/g Cr   Basic metabolic panel  (Ca, Cl, CO2, Creat, Gluc, K, Na, BUN)   Result Value Ref Range    Sodium 143 133 - 144 mmol/L    Potassium 3.8 3.4 - 5.3 mmol/L    Chloride 105 94 - 109 mmol/L    Carbon Dioxide 27 20 - 32 mmol/L    Anion Gap 11 3 - 14 mmol/L    Glucose 173 (H) 70 - 99 mg/dL    Urea Nitrogen 16 7 - 30 mg/dL    Creatinine 1.19 (H) 0.52 - 1.04 mg/dL    GFR  Estimate 45 (L) >60 mL/min/1.7m2    GFR Estimate If Black 54 (L) >60 mL/min/1.7m2    Calcium 9.4 8.5 - 10.1 mg/dL   T4 free   Result Value Ref Range    T4 Free 1.47 (H) 0.76 - 1.46 ng/dL     *Note: Due to a large number of results and/or encounters for the requested time period, some results have not been displayed. A complete set of results can be found in Results Review.           Sincerely,    Rafaela William MD/nr

## 2017-03-08 NOTE — PROGRESS NOTES
"HPI      ROS      Physical Exam      SUBJECTIVE:                                                    Mariel Ribeiro is a 71 year old female who presents to clinic today for the following health issues:    Pt states her chiropractor says she should get an x-ray of back, symptoms are getting worse.    Acute Illness   Acute illness concerns: cough and chest congestion  Onset: x 1 week    Fever: no    Chills/Sweats: YES- pt feels cold    Headache (location?): YES- all the time since concussion    Sinus Pressure:no    Conjunctivitis:  YES    Ear Pain: no    Rhinorrhea: YES    Congestion: YES    Sore Throat: YES     Cough: YES    Wheeze: YES    Decreased Appetite: YES    Nausea: YES- some    Vomiting: no    Diarrhea:  no    Dysuria/Freq.:frequency    Fatigue/Achiness: YES    Sick/Strep Exposure: no     Therapies Tried and outcome: Tylenol     Increased cough, some SOB, wheezing, increased sputum and sputum purulence; some thoracic back pain, which she thought was related to her fall (and has been recommended by physical therapy to have plain films today to see if there are any fractures), but now thinks it might be due to her lungs.  Using her inhalers as usual.  No fevers.      Low DBPs (40s) and decreased mental status with change in position from supine to sitting: noticed by physical therapist working with her with regards to postconcussion syndrome.  She seems to \"go blank\" when she sits up, and sometimes pitches forwards or backwards; it takes up to 45 seconds before she seems to come to, and a few more minutes before she seems to come back to her baseline mentation.  Her eyes are open during these episodes, and there have not been convulsions.  When I bring up these concerns today with Mariel, she states that she has had this issue for many years.  She says at home, she just is very careful and sits up slowly.  Her roommate Keila knows to gently pat her cheek and say her name, and she comes around.  There is no " "associated chest pain, palpitations, dizziness, or shortness of breath with these episodes.      Constipation: related to chronic opiate use.  She normally just uses miralax on an as-needed basis for this, but she saw a commercial for a new product marketed for narcotic-associated constipation and wonders if she can try it.     Left breast mass: recently noticed.  It is not painful.      Neck mass: she never went in for ultrasound.  It is not changing.  It has been there for years.  It is not painful, but she is aware of it.  She has a h/o thyroid cancer; she is not sure what type.      Hypothyroidism: due for labs.    Diabetes: reports blood sugars have been doing well, and no hypoglycemia.  She has lost some weight; she attributes this to some intermittent nausea she has had since CHI.     Post concussion syndrome: attending the concussion clinic, rehab, still having pounding left headache, and also having pounding pain in her left eye that is \"exploding.\"  She says she was supposed to see a neurophthalmologist, but states she was never given a call to schedule.      Problem list and histories reviewed & adjusted, as indicated.  Additional history: as documented    Patient Active Problem List   Diagnosis     Hypothyroidism      HX PHYSICAL ABUSE     Coronary atherosclerosis     Myalgia and myositis     Insomnia     Migraine     Chronic airway obstruction/ COPD     Mononeuritis     FAMILY HX-THROMBOSIS     Obstructive sleep apnea     Vitamin D deficiency     Hyperuricemia     Type 2 diabetes mellitus with diabetic chronic kidney disease (H)     H/O chronic kidney disease     Hypertension goal BP (blood pressure) < 130/80     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)     Hyperlipidemia with target LDL less than 70     Chronic diastolic heart failure (H)     Intermediate coronary syndrome (H)     Osteoarthritis     Morbid obesity (H)     Chest pain     Advanced directives, counseling/discussion     Arthritis of knee "     Total knee replacement status     Constipation     Hypokalemia     Stroke (H)     Transient cerebral ischemia     Spells     Pain in joint of left shoulder     COPD (chronic obstructive pulmonary disease) (H)     History of thyroid cancer     Status post coronary angiogram     Cervicalgia     Past Surgical History   Procedure Laterality Date     Surgical history of -   1963     dentures     Surgical history of -   1985     thyroidectomy     Surgical history of -   1998     right thumb surgery     Surgical history of -   2001     right breast biopsy (benign)     Surgical history of -   4/04     left shoulder surgery      Surgical history of -   4/09     left thumb surgery     Hc introduce needle/cath, extremity artery  1999,2002,2004     Angiocardiogram     Thyroidectomy       Angiography  8/11     with stent placement X2 mid- and proximal LAD     Colonoscopy       Phacoemulsification clear cornea with standard intraocular lens implant Right 12/29/2014     Procedure: PHACOEMULSIFICATION CLEAR CORNEA WITH STANDARD INTRAOCULAR LENS IMPLANT;  Surgeon: Smith Quintana MD;  Location: [facility]     Phacoemulsification clear cornea with toric intraocular lens implant Left 1/12/2015     Procedure: PHACOEMULSIFICATION CLEAR CORNEA WITH TORIC INTRAOCULAR LENS IMPLANT;  Surgeon: Smith Quintana MD;  Location: [facility] knee scope, diagnostic  1990's     Arthroscopy, Knee, bilateral     C total knee arthroplasty  7/30/04     Knee Replacement, Total right and left     Arthroplasty knee  1/28/2014     Procedure: ARTHROPLASTY KNEE;  ARTHROPLASTY KNEE -RIGHT TOTAL  ;  Surgeon: Victor Manuel Wolff MD;  Location:  OR     Dilation and curettage, operative hysteroscopy with morcellator, combined N/A 11/1/2016     Procedure: COMBINED DILATION AND CURETTAGE, OPERATIVE HYSTEROSCOPY WITH MORCELLATOR;  Surgeon: Stacey Arnold DO;  [facility]: [facility]       Social History   Substance Use Topics     Smoking status: Former  Smoker     Packs/day: 0.00     Years: 27.00     Types: Cigarettes     Quit date: 7/1/1989     Smokeless tobacco: Never Used     Alcohol use No     Family History   Problem Relation Age of Onset     C.A.D. Mother      DIABETES Mother      Hypertension Mother      Blood Disease Mother      multiple episodes of thrombosis     Circulatory Mother      DVT X 2; ocular clot; cerebral; carotid artery stenosis     C.A.D. Father      Hypertension Father      CEREBROVASCULAR DISEASE Father      Alcohol/Drug Father      etoh     CANCER Brother      liver,pancreas, brain     Cardiovascular Sister      Hypertension Sister      Hypertension Brother      Alcohol/Drug Sister      etoh     Alcohol/Drug Brother      drug     DIABETES Sister      younger     C.A.D. Sister      CABG age 65     C.A.D. Brother      CABG age 42     C.A.D. Sister      stents age 58     C.A.D. Brother      Genitourinary Problems Sister      kidney disease         Current Outpatient Prescriptions   Medication Sig Dispense Refill     levothyroxine (SYNTHROID/LEVOTHROID) 175 MCG tablet Take 1 tablet (175 mcg) by mouth daily Repeat labs in 4-6 weeks 90 tablet 0     doxycycline (VIBRAMYCIN) 100 MG capsule Take 1 capsule (100 mg) by mouth 2 times daily 20 capsule 0     albuterol (ALBUTEROL) 108 (90 BASE) MCG/ACT Inhaler INHALE 1 TO 2 PUFFS EVERY 4 TO 6 HOURS AS NEEDED 1 Inhaler PRN     predniSONE (DELTASONE) 20 MG tablet Take 1 tablet (20 mg) by mouth daily 5 tablet 0     febuxostat (ULORIC) 40 MG TABS tablet Take 1 tablet (40 mg) by mouth every evening 90 tablet 3     traZODone (DESYREL) 50 MG tablet Take 3 tablets (150 mg) by mouth At Bedtime 90 tablet 7     glimepiride (AMARYL) 4 MG tablet Take 1 tablet (4 mg) by mouth 2 times daily (with meals) 180 tablet 1     fluticasone (FLOVENT DISKUS) 100 MCG/BLIST AEPB Inhale 1 puff into the lungs every 12 hours 60 Inhaler 3     EPINEPHrine 0.3 MG/0.3ML injection Inject 0.3 mLs (0.3 mg) into the muscle once as needed for  anaphylaxis 0.6 mL 0     MORPHINE SULFATE PO Take 15 mg by mouth 3 times daily       medroxyPROGESTERone (PROVERA) 10 MG tablet Take 1 tablet (10 mg) by mouth daily 90 tablet 3     tiZANidine (ZANAFLEX) 4 MG capsule 1 TABLET DAILY at bed time 90 capsule 3     metFORMIN (GLUCOPHAGE-XR) 500 MG 24 hr tablet 2 tabs after breakfast and supper 360 tablet 1     Miconazole Nitrate 2 % POWD Apply to rash on chest three times daily.  Continue for one week after the rash has resolved. 100 g 3     furosemide (LASIX) 40 MG tablet Take 2 tablets (80 mg) by mouth 2 times daily 120 tablet 6     escitalopram (LEXAPRO) 20 MG tablet Take 1 tablet (20 mg) by mouth every morning 90 tablet 3     blood glucose monitoring (NO BRAND SPECIFIED) test strip 1 strip by In Vitro route 2 times daily Strips per patient's preference 200 each 0     niacin (NIASPAN) 500 MG CR tablet Take 1 tablet (500 mg) by mouth 2 times daily 180 tablet 3     atorvastatin (LIPITOR) 40 MG tablet Take 1 tablet (40 mg) by mouth daily 90 tablet 3     polyethylene glycol (MIRALAX) powder Take 17 grams by mouth once to twice daily. (Patient taking differently: Take 17 g by mouth 2 times daily as needed for constipation ) 510 g 1     "[employer] FINEPOINT LANCETS MISC Use to test blood sugars 2 times daily or as directed. 100 each prn     nitroglycerin (NITROSTAT) 0.4 MG SL tablet Place 1 tablet (0.4 mg) under the tongue every 5 minutes as needed for chest pain As needed for chest pain. 25 tablet PRN     senna-docusate (SENOKOT-S;PERICOLACE) 8.6-50 MG per tablet Take 1-2 tablets by mouth 2 times daily as needed for constipation 60 tablet 0     aspirin  MG tablet Take 1 tablet by mouth daily. 30 tablet 0     Folic Acid 800 MCG TABS Take 800 mcg by mouth daily.       Cholecalciferol (VITAMIN D3) 5000 UNIT/ML LIQD Take 10,000 Units by mouth daily        ONE TOUCH ULTRA (DEVICES) MISC test blood sugar BID 1 0     GUAIFENESIN  MG OR TBCR Take 600 mg by mouth twice  "daily 30 0     pregabalin (LYRICA) 100 MG capsule Take 1 capsule (100 mg) by mouth 2 times daily 180 capsule 2     potassium chloride SA (K-DUR,KLOR-CON M) 10 MEQ tablet Take 2 tablets (20 mEq) by mouth 2 times daily 120 tablet 11     Allergies   Allergen Reactions     Contrast Dye Anaphylaxis     Fish Allergy Anaphylaxis     Iodine Anaphylaxis     Nuts Anaphylaxis     Tree nuts only (peanuts ok)     Oxycodone Other (See Comments)     Severe suicidal tendencies on this medication     Bactrim      Increased uric acid     Betadine [Povidone Iodine] Hives, Swelling and Difficulty breathing     Betadine     Combivent      Rash     Dulaglutide Other (See Comments)     Hx . Of thyroid cancer.     Penicillins Rash     Allopurinol Rash     Latex Rash     Wool Fiber Rash       Reviewed and updated as needed this visit by clinical staff       Reviewed and updated as needed this visit by Provider         ROS:  Constitutional, HEENT, cardiovascular, pulmonary, gi and gu systems are negative, except as otherwise noted.    OBJECTIVE:                                                    /65 (Patient Position: Chair, Cuff Size: Adult Regular)  Pulse 74  Temp 97.4  F (36.3  C) (Oral)  Resp 16  Ht 5' 6.5\" (1.689 m)  Wt (!) 308 lb (139.7 kg)  SpO2 94%  BMI 48.97 kg/m2  Body mass index is 48.97 kg/(m^2).  GENERAL APPEARANCE: alert, no distress, over weight and wearing dark glasses due to headache  HENT: nose and mouth without ulcers or lesions  NECK: there is a large 2-3cm round, nontender nodule to her right anterior neck that is somewhat mobile and firm.    RESP: decreased breath sounds in the bases bilaterally, wheeze only in the right lung base and possibly faint rales.    CV: regular rates and rhythm, normal S1 S2, no S3 or S4 and no murmur, click or rub  ABDOMEN: soft, nontender, normal bowel sounds.  Spine: some mild ttp over the thoracic spinous processes around T7-8 level.   PSYCH: mentation appears normal and affect " normal/bright, making some jokes and seems to be in good spirits despite her headache    Diagnostic Test Results:  Results for orders placed or performed in visit on 03/08/17   TSH with free T4 reflex   Result Value Ref Range    TSH 0.12 (L) 0.40 - 4.00 mU/L   Hemoglobin A1c   Result Value Ref Range    Hemoglobin A1C 7.2 (H) 4.3 - 6.0 %   Lipid Profile   Result Value Ref Range    Cholesterol 186 <200 mg/dL    Triglycerides 237 (H) <150 mg/dL    HDL Cholesterol 50 >49 mg/dL    LDL Cholesterol Calculated 89 <100 mg/dL    Non HDL Cholesterol 136 (H) <130 mg/dL   Albumin Random Urine Quantitative   Result Value Ref Range    Creatinine Urine 104 mg/dL    Albumin Urine mg/L 20 mg/L    Albumin Urine mg/g Cr 19.33 0 - 25 mg/g Cr   Basic metabolic panel  (Ca, Cl, CO2, Creat, Gluc, K, Na, BUN)   Result Value Ref Range    Sodium 143 133 - 144 mmol/L    Potassium 3.8 3.4 - 5.3 mmol/L    Chloride 105 94 - 109 mmol/L    Carbon Dioxide 27 20 - 32 mmol/L    Anion Gap 11 3 - 14 mmol/L    Glucose 173 (H) 70 - 99 mg/dL    Urea Nitrogen 16 7 - 30 mg/dL    Creatinine 1.19 (H) 0.52 - 1.04 mg/dL    GFR Estimate 45 (L) >60 mL/min/1.7m2    GFR Estimate If Black 54 (L) >60 mL/min/1.7m2    Calcium 9.4 8.5 - 10.1 mg/dL   T4 free   Result Value Ref Range    T4 Free 1.47 (H) 0.76 - 1.46 ng/dL     *Note: Due to a large number of results and/or encounters for the requested time period, some results have not been displayed. A complete set of results can be found in Results Review.        ASSESSMENT/PLAN:                                                    1.  Postconcussion syndrome: greatly appreciate concussion clinic recommendations.   2.  Eye pain: we will help her get in for her speciality appointment.  3.  COPD exacerbation: mild; I am checking plain films today given asymmetric exam.  Starting prednisone and doxycycline which has done well for her in the past, monitor for hyperglycemia.   4.  T spine pain: plain films  5.  Hypothyroidism:  there has been a change in her TSH/t4; possibly because of weight loss.  I will slightly lower her dose and recheck in 4-6 weeks.  See orders.  6.  CKD: stable  7.  DM2: A1c stable and at goal of <8.    8.  Low DBP (when supine) and episodes of altered mentation with changing position from supine to sitting.  This is not new, and is possibly exacerbated by CHI.  In the past, she has had evaluation with cardiology and neurology for these episodes.  She does have some carotid disease, so I will recheck her US.  Some of her medications may be contributing to lower DBP, including narcotic, Lyrica and lasix; her lasix was increased this past fall by her cardiologist.  I would like her to follow up with cardiology (I am not changing her lasix today) as well.  She had normal EEG with neurology.  MRI and MRA head and neck were done in 2011 (greater than 70% stenosis at the origin of the right external carotid, moderate stenosis at the origin of the left common carotid, and mild to moderate stenosis proximal right vertebral).  It looks like she was then referred to Dr. De La Fuente for Tilt Table testing; I don't see that she ever had this done.  I would defer to her cardiologist on whether this or any other testing of her heart would be useful, and whether her lasix should be changed.  Per documentation by Rosina Blount in 2007, decreasing lyrica did help with the episodes, but resulted in increased fibromyalgia pain.    9.  Left breast mass:TC to assist in coordinating an imaging appointment.   10.  Neck mass: TC to assist with getting patient in for US        Rafaela William MD  [facility]

## 2017-03-08 NOTE — MR AVS SNAPSHOT
After Visit Summary   3/8/2017    Mariel Ribeiro    MRN: [id number]           Patient Information     Date Of Birth          1945        Visit Information        Provider Department      3/8/2017 1:00 PM Rafaela William MD [facility]        Today's Diagnoses     Recurrent major depressive disorder, in partial remission (H)    -  1    Chronic obstructive pulmonary disease with acute exacerbation (H)        Chronic bilateral thoracic back pain        Bilateral carotid artery stenosis        Mass of neck        Left breast mass        Drug-induced constipation        Hypothyroidism, unspecified type        Type 2 diabetes mellitus with chronic kidney disease, without long-term current use of insulin, unspecified CKD stage (H)        Hypokalemia        Chronic diastolic heart failure (H)        Hypertension goal BP (blood pressure) < 130/80          Care Instructions    For your chest congestion:  Doxycycline twice daily for 10 days, and prednisone 20mg once daily in the mornings for 5 days.     I sent in Movantik, which is a medication for narcotic-associated constipation.  Not sure if insurance will cover it (probably not).     We are checking x-rays today of your chest and your thoracic spine.      We are checking lab work today for your A1c, thyroid, urine, and cholesterol and kidney function.              Follow-ups after your visit        Your next 10 appointments already scheduled     Mar 09, 2017  1:00 PM CST   Treatment 45 with Liudmila Harper, PT   [facility], Physical Therapy - Outpatient ([facility])    [address], [address]  [place] [address]   [phone]            Mar 14, 2017  1:00 PM CDT   NEW NEURO with Jefry Ramos MD   [facility] ([facility])    [facility]  [address] [address]  [facility] [address]   [phone]            Mar 16, 2017  2:00 PM CDT    Treatment 60 with Yumiko Ayala, OT   [facility] ([facility])    [address]  [place] [address]   [phone]            Mar 16, 2017  3:00 PM CDT   Treatment 45 with Liudmila Harper, PT   [facility] [place], Physical Therapy - Outpatient ([facility])    [address]  [place] [address]   [phone]            Mar 23, 2017  1:45 PM CDT   Treatment 45 with Liudmila Harper, PT   [facility] [place], Physical Therapy - Outpatient ([facility])    [address]  [place] [address]   [phone]            Mar 30, 2017  1:15 PM CDT   Treatment 45 with Liudmila Harper, PT   [facility] [place], Physical Therapy - Outpatient ([facility])    [address]  [place] [address]   [phone]            Mar 30, 2017  2:00 PM CDT   Treatment 45 with Yumiko Ayala, OT   [facility] ([facility])    [address]  [place] [address]   [phone]            Apr 06, 2017  1:00 PM CDT   Treatment 45 with Nicole Hameed, PT   [facility] ([facility])    [address]  [facility] [address]   [phone]            Apr 13, 2017  1:00 PM CDT   Treatment 45 with Yumiko Ayala, OT   [facility] ([facility])    [address]  [place] [facility] [address]   [phone]            Apr 13, 2017  1:45 PM CDT   Treatment 45 with Liudmila Harper, PT   [facility] [place], Physical Therapy - Outpatient ([facility])    [address],  [address]  [place] [address]   [phone]              Future tests that were ordered for you today     Open Future Orders        Priority Expected Expires Ordered    US Breast Left Complete 4 Quadrants Routine  3/8/2018 3/8/2017    MA Diagnostic Digital Left Routine  3/8/2018 3/8/2017    US Carotid Bilateral Routine  3/8/2018 3/8/2017    US Head Neck Soft Tissue Routine 2017 3/8/2018 3/8/2017            Who to contact     If you have questions or need follow up information about today's clinic visit or your schedule please contact [facility] directly at [phone].  Normal or non-critical lab and imaging results will be communicated to you by [employer], letter or phone within 4 business days after the clinic has received the results. If you do not hear from us within 7 days, please contact the clinic through [employer] or phone. If you have a critical or abnormal lab result, we will notify you by phone as soon as possible.  Submit refill requests through [facility] or call your pharmacy and they will forward the refill request to us. Please allow 3 business days for your refill to be completed.          Additional Information About Your Visit        [facility] Information     [facility] lets you send messages to your doctor, view your test results, renew your prescriptions, schedule appointments and more. To sign up, go to [email or web address] . Click on \"Log in\" on the left side of the screen, which will take you to the Welcome page. Then click on \"Sign up Now\" on the right side of the page.     You will be asked to enter the access code listed below, as well as some personal information. Please follow the directions to create your username and password.     Your access code is: [email or web address]  Expires: 2017  2:11 PM     Your access code will  in 90 days. If you need help or a new code, please call your [facility] or [phone].        Care EveryWhere ID     This is your Care " "EveryWhere ID. This could be used by other organizations to access your [place] medical records  [id number]        Your Vitals Were     Pulse Temperature Respirations Height Pulse Oximetry BMI (Body Mass Index)    74 97.4  F (36.3  C) (Oral) 16 5' 6.5\" (1.689 m) 94% 48.97 kg/m2       Blood Pressure from Last 3 Encounters:   03/08/17 133/65   01/20/17 158/72   12/23/16 139/75    Weight from Last 3 Encounters:   03/08/17 (!) 308 lb (139.7 kg)   01/20/17 (!) 312 lb (141.5 kg)   12/23/16 (!) 314 lb (142.4 kg)              We Performed the Following     Albumin Random Urine Quantitative     Basic metabolic panel  (Ca, Cl, CO2, Creat, Gluc, K, Na, BUN)     DEPRESSION ACTION PLAN (DAP)     Hemoglobin A1c     Lipid Profile     TSH with free T4 reflex          Today's Medication Changes          These changes are accurate as of: 3/8/17  2:11 PM.  If you have any questions, ask your nurse or doctor.               Start taking these medicines.        Dose/Directions    doxycycline 100 MG capsule   Commonly known as:  VIBRAMYCIN   Used for:  Chronic obstructive pulmonary disease with acute exacerbation (H)   Started by:  Rafaela William MD        Dose:  100 mg   Take 1 capsule (100 mg) by mouth 2 times daily   Quantity:  20 capsule   Refills:  0       naloxegol 12.5 MG Tabs tablet   Commonly known as:  MOVANTIK   Used for:  Drug-induced constipation   Started by:  Rafaela William MD        Dose:  12.5 mg   Take 1 tablet (12.5 mg) by mouth every morning (before breakfast)   Quantity:  90 tablet   Refills:  0       predniSONE 20 MG tablet   Commonly known as:  DELTASONE   Used for:  Chronic obstructive pulmonary disease with acute exacerbation (H)   Started by:  Rafaela William MD        Dose:  20 mg   Take 1 tablet (20 mg) by mouth daily   Quantity:  5 tablet   Refills:  0         These medicines have changed or have updated prescriptions.        Dose/Directions    polyethylene glycol powder   Commonly known as:  " MIRALAX   This may have changed:    - how much to take  - how to take this  - when to take this  - reasons to take this  - additional instructions   Used for:  Drug-induced constipation        Take 17 grams by mouth once to twice daily.   Quantity:  510 g   Refills:  1            Where to get your medicines      These medications were sent to [facility] - [place], [place] - [address] AT [facility] [address]  [address], [place] [address]    Hours:  24-hours Phone:  [phone]     albuterol 108 (90 BASE) MCG/ACT Inhaler    doxycycline 100 MG capsule    naloxegol 12.5 MG Tabs tablet    predniSONE 20 MG tablet                Primary Care Provider Office Phone # Fax #    Rafaela William [phone]       [facility] [address]  [place] [address]        Thank you!     Thank you for choosing [facility]  for your care. Our goal is always to provide you with excellent care. Hearing back from our patients is one way we can continue to improve our services. Please take a few minutes to complete the written survey that you may receive in the mail after your visit with us. Thank you!             Your Updated Medication List - Protect others around you: Learn how to safely use, store and throw away your medicines at www.disposemymeds.org.          This list is accurate as of: 3/8/17  2:11 PM.  Always use your most recent med list.                   Brand Name Dispense Instructions for use    albuterol 108 (90 BASE) MCG/ACT Inhaler    albuterol    1 Inhaler    INHALE 1 TO 2 PUFFS EVERY 4 TO 6 HOURS AS NEEDED       aspirin  MG EC tablet     30 tablet    Take 1 tablet by mouth daily.       atorvastatin 40 MG tablet    LIPITOR    90 tablet    Take 1 tablet (40 mg) by mouth daily       blood glucose monitoring test strip    no brand specified    200 each    1 strip by In Vitro route 2 times daily Strips per patient's  preference       doxycycline 100 MG capsule    VIBRAMYCIN    20 capsule    Take 1 capsule (100 mg) by mouth 2 times daily       EPINEPHrine 0.3 MG/0.3ML injection     0.6 mL    Inject 0.3 mLs (0.3 mg) into the muscle once as needed for anaphylaxis       escitalopram 20 MG tablet    LEXAPRO    90 tablet    Take 1 tablet (20 mg) by mouth every morning       febuxostat 40 MG Tabs tablet    ULORIC    90 tablet    Take 1 tablet (40 mg) by mouth every evening       fluticasone 100 MCG/BLIST Aepb    FLOVENT DISKUS    60 Inhaler    Inhale 1 puff into the lungs every 12 hours       folic acid 800 MCG Tabs      Take 800 mcg by mouth daily.       furosemide 40 MG tablet    LASIX    120 tablet    Take 2 tablets (80 mg) by mouth 2 times daily       glimepiride 4 MG tablet    AMARYL    180 tablet    Take 1 tablet (4 mg) by mouth 2 times daily (with meals)       GUAIFENESIN  MG Tbcr     30    Take 600 mg by mouth twice daily       levothyroxine 200 MCG tablet    SYNTHROID/LEVOTHROID    30 tablet    Take 1 tablet (200 mcg) by mouth every morning Take one tablet daily *WINSOME*  PLEASE SCHEDULE A ;AB ONLY APPOINTMENT TO RECEIVE FUTURE REFILLS [phone]       [place] FINEPOINT LANCETS Misc     100 each    Use to test blood sugars 2 times daily or as directed.       medroxyPROGESTERone 10 MG tablet    PROVERA    90 tablet    Take 1 tablet (10 mg) by mouth daily       metFORMIN 500 MG 24 hr tablet    GLUCOPHAGE-XR    360 tablet    2 tabs after breakfast and supper       Miconazole Nitrate 2 % Powd     100 g    Apply to rash on chest three times daily.  Continue for one week after the rash has resolved.       MORPHINE SULFATE PO      Take 15 mg by mouth 3 times daily       naloxegol 12.5 MG Tabs tablet    MOVANTIK    90 tablet    Take 1 tablet (12.5 mg) by mouth every morning (before breakfast)       niacin 500 MG CR tablet    NIASPAN    180 tablet    Take 1 tablet (500 mg) by mouth 2 times daily       nitroglycerin 0.4 MG  sublingual tablet    NITROSTAT    25 tablet    Place 1 tablet (0.4 mg) under the tongue every 5 minutes as needed for chest pain As needed for chest pain.       ONE TOUCH ULTRA (DEVICES) Misc     1    test blood sugar BID       polyethylene glycol powder    MIRALAX    510 g    Take 17 grams by mouth once to twice daily.       potassium chloride SA 10 MEQ CR tablet    K-DUR/KLOR-CON M    120 tablet    Take 2 tablets (20 mEq) by mouth 2 times daily       predniSONE 20 MG tablet    DELTASONE    5 tablet    Take 1 tablet (20 mg) by mouth daily       pregabalin 100 MG capsule    LYRICA    180 capsule    Take 1 capsule (100 mg) by mouth 2 times daily       senna-docusate 8.6-50 MG per tablet    SENOKOT-S;PERICOLACE    60 tablet    Take 1-2 tablets by mouth 2 times daily as needed for constipation       tiZANidine 4 MG capsule    ZANAFLEX    90 capsule    1 TABLET DAILY at bed time       traZODone 50 MG tablet    DESYREL    90 tablet    Take 3 tablets (150 mg) by mouth At Bedtime       vitamin D3 5000 UNIT/ML Liqd      Take 10,000 Units by mouth daily

## 2017-03-09 ENCOUNTER — HOSPITAL ENCOUNTER (OUTPATIENT)
Dept: PHYSICAL THERAPY | Facility: CLINIC | Age: 72
Setting detail: THERAPIES SERIES
End: 2017-03-09
Attending: NURSE PRACTITIONER
Payer: MEDICARE

## 2017-03-09 ENCOUNTER — TELEPHONE (OUTPATIENT)
Dept: GENERAL RADIOLOGY | Facility: CLINIC | Age: 72
End: 2017-03-09

## 2017-03-09 DIAGNOSIS — M79.10 MYALGIA: ICD-10-CM

## 2017-03-09 LAB
ANION GAP SERPL CALCULATED.3IONS-SCNC: 11 MMOL/L (ref 3–14)
BUN SERPL-MCNC: 16 MG/DL (ref 7–30)
CALCIUM SERPL-MCNC: 9.4 MG/DL (ref 8.5–10.1)
CHLORIDE SERPL-SCNC: 105 MMOL/L (ref 94–109)
CHOLEST SERPL-MCNC: 186 MG/DL
CO2 SERPL-SCNC: 27 MMOL/L (ref 20–32)
CREAT SERPL-MCNC: 1.19 MG/DL (ref 0.52–1.04)
CREAT UR-MCNC: 104 MG/DL
GFR SERPL CREATININE-BSD FRML MDRD: 45 ML/MIN/1.7M2
GLUCOSE SERPL-MCNC: 173 MG/DL (ref 70–99)
HDLC SERPL-MCNC: 50 MG/DL
LDLC SERPL CALC-MCNC: 89 MG/DL
MICROALBUMIN UR-MCNC: 20 MG/L
MICROALBUMIN/CREAT UR: 19.33 MG/G CR (ref 0–25)
NONHDLC SERPL-MCNC: 136 MG/DL
POTASSIUM SERPL-SCNC: 3.8 MMOL/L (ref 3.4–5.3)
SODIUM SERPL-SCNC: 143 MMOL/L (ref 133–144)
T4 FREE SERPL-MCNC: 1.47 NG/DL (ref 0.76–1.46)
TRIGL SERPL-MCNC: 237 MG/DL
TSH SERPL DL<=0.005 MIU/L-ACNC: 0.12 MU/L (ref 0.4–4)

## 2017-03-09 PROCEDURE — 40000767 ZZHC STATISTIC PT CONCUSSION VISIT: Performed by: PHYSICAL THERAPIST

## 2017-03-09 PROCEDURE — 97110 THERAPEUTIC EXERCISES: CPT | Mod: GP | Performed by: PHYSICAL THERAPIST

## 2017-03-09 NOTE — TELEPHONE ENCOUNTER
I think Mariel said she was fine just using miralax as she has done if the movantik was not approved.  Thanks.

## 2017-03-09 NOTE — TELEPHONE ENCOUNTER
"Rx was denied for below medication/s:    Med Prescribed:  MOVANTIK  Reason:  NOT GIVEN  Recommendations from Insurance:  PA  Insurance Plan:  NOT GIVEN  Patient ID: NOT GIVEN  BIN/RX#:  NOT GIVEN  Phone:  NOT GIVEN  Fax:       On current med list:  YES    Would you like to change Rx or start PA. If you would like to start PA please include any pertinent information as to why it is needed, other medications taken and reasons why other medication were discontinued.      Please forward to your MA pool.      PA started for MOVANTIK.  To submit the PA:  1. Go to [email or web address] and click Enter a key  2. Enter the pt's last name and date of birth and the key   Patient last name: ESTELA   :[birth year]   Key:[id number]  3. Complete the form and click \"Send to Plan\"    PLEASE NOTIFY US WHEN YOU RECEIVE A DETERMINATION FROM THE PLAN.        Thank you,  Monica Gross RT (R)(M)      "

## 2017-03-09 NOTE — TELEPHONE ENCOUNTER
Controlled Substance Refill Request for LYRICA 100 MG  Problem List Complete:  No     PROVIDER TO CONSIDER COMPLETION OF PROBLEM LIST AND OVERVIEW/CONTROLLED SUBSTANCE AGREEMENT       Disp Refills Start End WINSOME   pregabalin (LYRICA) 100 MG capsule 180 capsule 2 6/3/2016  No   Sig: Take 1 capsule (100 mg) by mouth 2 times daily       Last Office Visit with [facility] primary care provider: 3-9-17    Future Office visit:     Controlled substance agreement on file: No.     Processing:  ?   checked in past 6 months?  No, route to RN

## 2017-03-10 ENCOUNTER — TELEPHONE (OUTPATIENT)
Dept: FAMILY MEDICINE | Facility: CLINIC | Age: 72
End: 2017-03-10

## 2017-03-10 DIAGNOSIS — H53.10 SUBJECTIVE VISUAL DISTURBANCE: Primary | ICD-10-CM

## 2017-03-10 RX ORDER — LEVOTHYROXINE SODIUM 175 UG/1
175 TABLET ORAL DAILY
Qty: 90 TABLET | Refills: 0 | Status: SHIPPED | OUTPATIENT
Start: 2017-03-10 | End: 2017-06-06

## 2017-03-10 RX ORDER — PREGABALIN 100 MG/1
100 CAPSULE ORAL 2 TIMES DAILY
Qty: 180 CAPSULE | Refills: 2 | Status: SHIPPED | OUTPATIENT
Start: 2017-03-10 | End: 2017-11-27

## 2017-03-10 NOTE — TELEPHONE ENCOUNTER
Please let Mariel know I've sent in a slightly lower dose of thyroid medication for her; her labs are running a little hyperthyroid.  We need to recheck in 4-6 weeks after she has been on the new dose.  thanks

## 2017-03-10 NOTE — TELEPHONE ENCOUNTER
Triage nurse spoke with patient.  Relayed message about the thyroid dose change.  She understands. Will plan to stop 200 mcg and start 175 mcg of thyroid med.  Recheck labs in 4-6 weeks.  TSH orders in place.  Monica Gonzalez RN

## 2017-03-12 ENCOUNTER — TELEPHONE (OUTPATIENT)
Dept: GENERAL RADIOLOGY | Facility: CLINIC | Age: 72
End: 2017-03-12

## 2017-03-12 DIAGNOSIS — E11.22 TYPE 2 DIABETES MELLITUS WITH DIABETIC CHRONIC KIDNEY DISEASE (H): ICD-10-CM

## 2017-03-12 NOTE — TELEPHONE ENCOUNTER
JANUVIA 50 MG TABLETS     NOT ON CURRENT MEDICATION LIST.              Last Written Prescription Date: ?????  Last Fill Quantity: ?????, # refills: ?????  Last Office Visit with [facility], P or [facility] prescribing provider:  3/8/2017        BP Readings from Last 3 Encounters:   03/08/17 133/65   01/20/17 158/72   12/23/16 139/75     Lab Results   Component Value Date    MICROL 20 03/08/2017     No results found for: MICROALBUMIN  Creatinine   Date Value Ref Range Status   03/08/2017 1.19 (H) 0.52 - 1.04 mg/dL Final   ]  GFR Estimate   Date Value Ref Range Status   03/08/2017 45 (L) >60 mL/min/1.7m2 Final     Comment:     Non  GFR Calc   11/18/2016 42 (L) >60 mL/min/1.7m2 Final     Comment:     Non  GFR Calc   10/27/2016 48 (L) >60 mL/min/1.7m2 Final     Comment:     Non  GFR Calc     GFR Estimate If Black   Date Value Ref Range Status   03/08/2017 54 (L) >60 mL/min/1.7m2 Final     Comment:      GFR Calc   11/18/2016 51 (L) >60 mL/min/1.7m2 Final     Comment:      GFR Calc   10/27/2016 58 (L) >60 mL/min/1.7m2 Final     Comment:      GFR Calc     Lab Results   Component Value Date    CHOL 186 03/08/2017     Lab Results   Component Value Date    HDL 50 03/08/2017     Lab Results   Component Value Date    LDL 89 03/08/2017    LDL 95 05/07/2013     Lab Results   Component Value Date    TRIG 237 03/08/2017     Lab Results   Component Value Date    CHOLHDLRATIO 3.2 05/05/2015     Lab Results   Component Value Date    AST 18 07/26/2016     Lab Results   Component Value Date    ALT 28 07/26/2016     Lab Results   Component Value Date    A1C 7.2 03/08/2017    A1C 7.3 12/23/2016    A1C 8.8 08/10/2016    A1C 7.9 04/05/2016    A1C 9.1 12/21/2015     Potassium   Date Value Ref Range Status   03/08/2017 3.8 3.4 - 5.3 mmol/L Final

## 2017-03-13 NOTE — TELEPHONE ENCOUNTER
Routing refill request to provider for review/approval because:  Drug not active on patient's medication list      I called the pharmacy and it was on auto refill. So I let them know it was no longer on her med list

## 2017-03-14 ENCOUNTER — OFFICE VISIT (OUTPATIENT)
Dept: OPHTHALMOLOGY | Facility: CLINIC | Age: 72
End: 2017-03-14
Attending: OPHTHALMOLOGY
Payer: MEDICARE

## 2017-03-14 DIAGNOSIS — H53.2 DIPLOPIA: ICD-10-CM

## 2017-03-14 DIAGNOSIS — F07.81 POST CONCUSSION SYNDROME: ICD-10-CM

## 2017-03-14 DIAGNOSIS — H53.40 VISUAL FIELD DEFECT: Primary | ICD-10-CM

## 2017-03-14 PROCEDURE — 92133 CPTRZD OPH DX IMG PST SGM ON: CPT | Mod: ZF | Performed by: OPHTHALMOLOGY

## 2017-03-14 PROCEDURE — 92025 CPTRIZED CORNEAL TOPOGRAPHY: CPT | Mod: ZF | Performed by: OPHTHALMOLOGY

## 2017-03-14 PROCEDURE — 99212 OFFICE O/P EST SF 10 MIN: CPT | Mod: ZF

## 2017-03-14 ASSESSMENT — EXTERNAL EXAM - LEFT EYE: OS_EXAM: NORMAL

## 2017-03-14 ASSESSMENT — CONF VISUAL FIELD
OS_SUPERIOR_NASAL_RESTRICTION: 3
OD_SUPERIOR_NASAL_RESTRICTION: 3
OD_INFERIOR_NASAL_RESTRICTION: 3
OS_INFERIOR_NASAL_RESTRICTION: 3

## 2017-03-14 ASSESSMENT — REFRACTION_WEARINGRX
OS_ADD: +2.50
OS_CYLINDER: +0.25
OD_AXIS: 018
OS_SPHERE: -2.50
OD_CYLINDER: +1.00
SPECS_TYPE: PAL
OS_AXIS: 076
OD_SPHERE: -2.25
OD_ADD: +2.50

## 2017-03-14 ASSESSMENT — CUP TO DISC RATIO
OD_RATIO: 0.5
OS_RATIO: 0.45

## 2017-03-14 ASSESSMENT — TONOMETRY
OD_IOP_MMHG: 9
IOP_METHOD: TONOPEN
OS_IOP_MMHG: 12

## 2017-03-14 ASSESSMENT — SLIT LAMP EXAM - LIDS
COMMENTS: NORMAL
COMMENTS: NORMAL

## 2017-03-14 ASSESSMENT — VISUAL ACUITY
OS_PH_CC: 20/60
OS_CC: 20/70
OD_PH_CC: 20/50
METHOD: SNELLEN - LINEAR
OD_CC: 20/60

## 2017-03-14 ASSESSMENT — EXTERNAL EXAM - RIGHT EYE: OD_EXAM: NORMAL

## 2017-03-14 NOTE — LETTER
3/14/2017         RE:  :  MRN: Mariel Ribeiro  1945  [id number]     Dear Dr. Razo,    Thank you for asking me to see your very pleasant patient, Mariel Ribeiro, in neuro-ophthalmic consultation.  I would like to thank you for sending your records and I have summarized them in the history of present illness.  My assessment and plan are below.  For further details, please see my attached clinic note.      Assessment & Plan     Mariel Ribeiro is a 71 year old female with the following diagnoses:   1. Visual field defect    2. Post concussion syndrome    3. 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  She has monocular double vision both eyes.  She has a visual field defect both eyes and dyschromatopsia both eyes.  Corneal topography was normal.  Autorefraction showed mild difference from her current Rx. Her optical coherence tomography showed normal RNFL thickness of both eyes. She does have significantly dry eyes both eyes. She is also on medications that can worsen dry eyes (lasix, lexapro) and is also on CPAP.  We discussed adequate hydration, artificial tears PRN, lid hygiene and flax seed (pt allergic to fish oil).     Return to clinic as needed.    Again, thank you for allowing me to participate in the care of your patient.      Sincerely,    Jefry Ramos MD  Professor, Neuro-Ophthalmology  Department of Ophthalmology and Visual Neurosciences  [facility]      CC: Jose Manuel Razo, GRISEL  VIA In Basket     Rafaela William MD  VIA In Basket     Karen Hilton [facility]  VIA In Basket     Zeeshan Hutson MD  [facility]  [address] [facility] [address]  VIA Facsimile: [phone]     Jefry Hanson DPM  VIA In Basket     Tom Cruz MD  VIA In Basket     Liudmila Rayo MD  VIA In Basket     Rosina Wolf, JODY  VIA In Basket     Kendy Zuniga, JODY  VIA In Basket     Rosina Kohler MD  VIA In Basket

## 2017-03-14 NOTE — NURSING NOTE
Chief Complaints and History of Present Illnesses   Patient presents with     Consult For     double vision BE     HPI    Affected eye(s):  Both   Symptoms:     Blurred vision   Double vision   Floaters (Comment: BE)   Flashes (Comment: LE)         Do you have eye pain now?:  Yes   Location:  OS   Pain Level:  Moderate Pain (4)   Pain Duration:  2 days   Pain Frequency:  Intermittent   Pain Characteristics:  Aching      Comments:  Had head injury on right side 11/11/2016  Have double and blurred vision, each eye alone and together. Varies how it doubles X 4 months  Visine bid HEBER Rowe COA 1:31 PM March 14, 2017

## 2017-03-14 NOTE — PROGRESS NOTES
Assessment & Plan     Mariel Ribeiro is a 71 year old female with the following diagnoses:   1. Visual field defect    2. Post concussion syndrome    3. 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  She has monocular double vision both eyes.  She has a visual field defect both eyes and dyschromatopsia both eyes.  Corneal topography was normal.  Autorefraction showed mild difference from her current Rx. Her optical coherence tomography showed normal RNFL thickness of both eyes. She does have significantly dry eyes both eyes. She is also on medications that can worsen dry eyes (lasix, lexapro) and is also on CPAP.  We discussed adequate hydration, artificial tears PRN, lid hygiene and flax seed (pt allergic to fish oil).     Return to clinic as needed.       Attending Physician Attestation:  I have seen and examined this patient.  I have confirmed and edited as necessary the chief complaint(s), history of present illness, review of systems, relevant history, and examination findings as documented by others.  I have personally reviewed the relevant tests, images, and reports as documented above.  I have confirmed and edited as necessary the assessment and plan and agree with this note.  - Jefry Ramos MD 3:22 PM 3/14/2017

## 2017-03-14 NOTE — MR AVS SNAPSHOT
After Visit Summary   3/14/2017    Mariel Ribeiro    MRN: [id number]           Patient Information     Date Of Birth          1945        Visit Information        Provider Department      3/14/2017 1:00 PM Jefry Ramos MD [facility]        Today's Diagnoses     Visual field defect    -  1    Post concussion syndrome        Diplopia           Follow-ups after your visit        Your next 10 appointments already scheduled     Mar 16, 2017  2:00 PM CDT   Treatment 60 with Yumiko Ayala, OT   [facility], [place], Occupational Therapy - Outpatient ([facility])    [address], [address]  [place] [address]   [phone]            Mar 16, 2017  3:00 PM CDT   Treatment 45 with Liudmila Harper, PT   [facility], [place], Physical Therapy - Outpatient ([facility])    [address], [address]  [place] [address]   [phone]            Mar 17, 2017 11:00 AM CDT   US CAROTID BILATERAL with UCUSV2   [facility] US ([facility])    [address] [place] [id number] [phone]           Please bring a list of your medicines (including vitamins, minerals and over-the-counter drugs). Also, tell your doctor about any allergies you may have. Wear comfortable clothes and leave your valuables at home.  You do not need to do anything special to prepare for your exam.  Please call the Imaging Department at your exam site with any questions.            Mar 17, 2017 12:00 PM CDT   US HEAD NECK SOFT TISSUE with UCUS3   [facility] ([facility])    [address] [place] [address] [id number] [phone]           Please bring a list of your medicines (including vitamins, minerals and over-the-counter drugs). Also, tell your doctor about any allergies you may have. Wear comfortable clothes and  leave your valuables at home.  You do not need to do anything special to prepare for your exam.  Please call the Imaging Department at your exam site with any questions.            Mar 17, 2017  1:00 PM CDT   (Arrive by 12:45 PM)   MA DIAGNOSTIC DIGITAL LEFT with [id number]   [facility] ([facility])    [address] [place] [id number] [phone]           Do not use any powder, lotion or deodorant under your arms or on your breast. If you do, we will ask you to remove it before your exam.  Wear comfortable, two-piece clothing.  If you have any allergies, tell your care team.  Bring any previous mammograms from other facilities or have them mailed to the breast center.            Mar 17, 2017  1:30 PM CDT   US BREAST LEFT CMPL 4 QUAD with UCBCUS1   [facility] ([facility])    [address] [place] [address] [phone]           Please bring a list of your medicines (including vitamins, minerals and over-the-counter drugs). Also, tell your doctor about any allergies you may have. Wear comfortable clothes and leave your valuables at home.  You do not need to do anything special to prepare for your exam.  Please call the Imaging Department at your exam site with any questions.            Mar 23, 2017  1:45 PM CDT   Treatment 45 with Liudmila Harper, PT   [facility], [place], Physical Therapy - Outpatient ([facility])    [address]  [place] [address]   [phone]            Mar 30, 2017  1:15 PM CDT   Treatment 45 with Liudmila Harper, PT   [facility], [place], Physical Therapy - Outpatient ([facility])    [address], [address]  [place] [address]   [phone]            Mar 30, 2017  2:00 PM CDT   Treatment 45 with Yumiko Ayala, OT   [facility], [place], Occupational  Therapy - Outpatient ([facility])    [address], [address]  [place] [address]   [phone]            2017  1:00 PM CDT   Treatment 45 with Nicole Hameed PT   [facility], [place], Physical Therapy - Outpatient ([facility])    [address]  [place] [address]   [phone]              Who to contact     Please call your clinic at [phone] to:    Ask questions about your health    Make or cancel appointments    Discuss your medicines    Learn about your test results    Speak to your doctor   If you have compliments or concerns about an experience at your clinic, or if you wish to file a complaint, please contact [facility] Patient Relations at [phone] or email us at [email or web address]         Additional Information About Your Visit        [employer] Information     [email or web address] is an electronic gateway that provides easy, online access to your medical records. With [email or web address], you can request a clinic appointment, read your test results, renew a prescription or communicate with your care team.     To sign up for [email or web address] visit the website at [email or web address]   You will be asked to enter the access code listed below, as well as some personal information. Please follow the directions to create your username and password.     Your access code is: [email or web address]  Expires: 2017  3:11 PM     Your access code will  in 90 days. If you need help or a new code, please contact your [facility] or call [phone] for assistance.        Care EveryWhere ID     This is your Care EveryWhere ID. This could be used by other organizations to access your [place] medical records  [id number]         Blood Pressure from Last 3 Encounters:   17 133/65   17 158/72   16 139/75    Weight from Last 3  Encounters:   03/08/17 (!) 139.7 kg (308 lb)   01/20/17 (!) 141.5 kg (312 lb)   12/23/16 (!) 142.4 kg (314 lb)              We Performed the Following     Corneal Topography OU (both eyes)     OCT Optic Nerve RNFL Spectralis OU (both eyes)          Today's Medication Changes          These changes are accurate as of: 3/14/17  3:51 PM.  If you have any questions, ask your nurse or doctor.               These medicines have changed or have updated prescriptions.        Dose/Directions    polyethylene glycol powder   Commonly known as:  MIRALAX   This may have changed:    - how much to take  - how to take this  - when to take this  - reasons to take this  - additional instructions   Used for:  Drug-induced constipation        Take 17 grams by mouth once to twice daily.   Quantity:  510 g   Refills:  1                Primary Care Provider Office Phone # Fax #    Rafaela William [phone]       [address] [place] [address]        Thank you!     Thank you for choosing [facility]  for your care. Our goal is always to provide you with excellent care. Hearing back from our patients is one way we can continue to improve our services. Please take a few minutes to complete the written survey that you may receive in the mail after your visit with us. Thank you!             Your Updated Medication List - Protect others around you: Learn how to safely use, store and throw away your medicines at www.disposemymeds.org.          This list is accurate as of: 3/14/17  3:51 PM.  Always use your most recent med list.                   Brand Name Dispense Instructions for use    albuterol 108 (90 BASE) MCG/ACT Inhaler    albuterol    1 Inhaler    INHALE 1 TO 2 PUFFS EVERY 4 TO 6 HOURS AS NEEDED       aspirin  MG EC tablet     30 tablet    Take 1 tablet by mouth daily.       atorvastatin 40 MG tablet    LIPITOR    90 tablet    Take 1 tablet (40 mg) by mouth daily       blood  glucose monitoring test strip    no brand specified    200 each    1 strip by In Vitro route 2 times daily Strips per patient's preference       doxycycline 100 MG capsule    VIBRAMYCIN    20 capsule    Take 1 capsule (100 mg) by mouth 2 times daily       EPINEPHrine 0.3 MG/0.3ML injection     0.6 mL    Inject 0.3 mLs (0.3 mg) into the muscle once as needed for anaphylaxis       escitalopram 20 MG tablet    LEXAPRO    90 tablet    Take 1 tablet (20 mg) by mouth every morning       febuxostat 40 MG Tabs tablet    ULORIC    90 tablet    Take 1 tablet (40 mg) by mouth every evening       fluticasone 100 MCG/BLIST Aepb    FLOVENT DISKUS    60 Inhaler    Inhale 1 puff into the lungs every 12 hours       folic acid 800 MCG Tabs      Take 800 mcg by mouth daily.       furosemide 40 MG tablet    LASIX    120 tablet    Take 2 tablets (80 mg) by mouth 2 times daily       glimepiride 4 MG tablet    AMARYL    180 tablet    Take 1 tablet (4 mg) by mouth 2 times daily (with meals)       GUAIFENESIN  MG Tbcr     30    Take 600 mg by mouth twice daily       levothyroxine 175 MCG tablet    SYNTHROID/LEVOTHROID    90 tablet    Take 1 tablet (175 mcg) by mouth daily Repeat labs in 4-6 weeks       [employer] FINEPOINT LANCETS Misc     100 each    Use to test blood sugars 2 times daily or as directed.       medroxyPROGESTERone 10 MG tablet    PROVERA    90 tablet    Take 1 tablet (10 mg) by mouth daily       metFORMIN 500 MG 24 hr tablet    GLUCOPHAGE-XR    360 tablet    2 tabs after breakfast and supper       Miconazole Nitrate 2 % Powd     100 g    Apply to rash on chest three times daily.  Continue for one week after the rash has resolved.       MORPHINE SULFATE PO      Take 15 mg by mouth 3 times daily       niacin 500 MG CR tablet    NIASPAN    180 tablet    Take 1 tablet (500 mg) by mouth 2 times daily       nitroglycerin 0.4 MG sublingual tablet    NITROSTAT    25 tablet    Place 1 tablet (0.4 mg) under the tongue every 5  minutes as needed for chest pain As needed for chest pain.       ONE TOUCH ULTRA (DEVICES) Misc     1    test blood sugar BID       polyethylene glycol powder    MIRALAX    510 g    Take 17 grams by mouth once to twice daily.       potassium chloride SA 10 MEQ CR tablet    K-DUR/KLOR-CON M    120 tablet    Take 2 tablets (20 mEq) by mouth 2 times daily       predniSONE 20 MG tablet    DELTASONE    5 tablet    Take 1 tablet (20 mg) by mouth daily       pregabalin 100 MG capsule    LYRICA    180 capsule    Take 1 capsule (100 mg) by mouth 2 times daily       senna-docusate 8.6-50 MG per tablet    SENOKOT-S;PERICOLACE    60 tablet    Take 1-2 tablets by mouth 2 times daily as needed for constipation       tiZANidine 4 MG capsule    ZANAFLEX    90 capsule    1 TABLET DAILY at bed time       traZODone 50 MG tablet    DESYREL    90 tablet    Take 3 tablets (150 mg) by mouth At Bedtime       vitamin D3 5000 UNIT/ML Liqd      Take 10,000 Units by mouth daily

## 2017-03-20 DIAGNOSIS — E11.22 TYPE 2 DIABETES MELLITUS WITH CHRONIC KIDNEY DISEASE, WITHOUT LONG-TERM CURRENT USE OF INSULIN, UNSPECIFIED CKD STAGE (H): Primary | ICD-10-CM

## 2017-03-20 NOTE — TELEPHONE ENCOUNTER
"=Per 8/10/2016 visit \"DM2: with neuropathy and nephropathy. She stopped her januvia\". Med taken off of med list  =Per 10/27/2016 visit \"DM2: with neuropathy and nephropathy.  She continues on glimepiride and is up to Metformin XL 1000mg BID. She recently picked up Januvia, but has not yet started it\" Med not entered on med list.  =per 12/23/2016 visit \"DM2: due for A1c, reports blood sugars had been a lot better (111, 131, 145), but they have been higher this week. She says she is still taking januvia, metformin and glimepiride\" med not documented on med list as taking.  == I called Mariel she states is taking Januvia 50 mg daily  ++ please sign off on meds, she is out  "

## 2017-03-20 NOTE — TELEPHONE ENCOUNTER
JANUVIA 50 MG         Last Written Prescription Date: NOT ON CURRENT MED LIST.   Last Fill Quantity: 0, # refills: 0  Last Office Visit with [facility], [facility] or [facility] prescribing provider:  3-8-17        BP Readings from Last 3 Encounters:   03/08/17 133/65   01/20/17 158/72   12/23/16 139/75     Lab Results   Component Value Date    MICROL 20 03/08/2017     No results found for: MICROALBUMIN  Creatinine   Date Value Ref Range Status   03/08/2017 1.19 (H) 0.52 - 1.04 mg/dL Final   ]  GFR Estimate   Date Value Ref Range Status   03/08/2017 45 (L) >60 mL/min/1.7m2 Final     Comment:     Non  GFR Calc   11/18/2016 42 (L) >60 mL/min/1.7m2 Final     Comment:     Non  GFR Calc   10/27/2016 48 (L) >60 mL/min/1.7m2 Final     Comment:     Non  GFR Calc     GFR Estimate If Black   Date Value Ref Range Status   03/08/2017 54 (L) >60 mL/min/1.7m2 Final     Comment:      GFR Calc   11/18/2016 51 (L) >60 mL/min/1.7m2 Final     Comment:      GFR Calc   10/27/2016 58 (L) >60 mL/min/1.7m2 Final     Comment:      GFR Calc     Lab Results   Component Value Date    CHOL 186 03/08/2017     Lab Results   Component Value Date    HDL 50 03/08/2017     Lab Results   Component Value Date    LDL 89 03/08/2017    LDL 95 05/07/2013     Lab Results   Component Value Date    TRIG 237 03/08/2017     Lab Results   Component Value Date    CHOLHDLRATIO 3.2 05/05/2015     Lab Results   Component Value Date    AST 18 07/26/2016     Lab Results   Component Value Date    ALT 28 07/26/2016     Lab Results   Component Value Date    A1C 7.2 03/08/2017    A1C 7.3 12/23/2016    A1C 8.8 08/10/2016    A1C 7.9 04/05/2016    A1C 9.1 12/21/2015     Potassium   Date Value Ref Range Status   03/08/2017 3.8 3.4 - 5.3 mmol/L Final

## 2017-03-23 ENCOUNTER — HOSPITAL ENCOUNTER (OUTPATIENT)
Dept: PHYSICAL THERAPY | Facility: CLINIC | Age: 72
Setting detail: THERAPIES SERIES
End: 2017-03-23
Attending: NURSE PRACTITIONER
Payer: MEDICARE

## 2017-03-23 PROCEDURE — 97110 THERAPEUTIC EXERCISES: CPT | Mod: GP | Performed by: PHYSICAL THERAPIST

## 2017-03-23 PROCEDURE — 97112 NEUROMUSCULAR REEDUCATION: CPT | Mod: GP | Performed by: PHYSICAL THERAPIST

## 2017-03-23 PROCEDURE — 40000767 ZZHC STATISTIC PT CONCUSSION VISIT: Performed by: PHYSICAL THERAPIST

## 2017-04-06 ENCOUNTER — HOSPITAL ENCOUNTER (OUTPATIENT)
Dept: PHYSICAL THERAPY | Facility: CLINIC | Age: 72
Setting detail: THERAPIES SERIES
End: 2017-04-06
Attending: NURSE PRACTITIONER
Payer: MEDICARE

## 2017-04-06 PROCEDURE — 97116 GAIT TRAINING THERAPY: CPT | Mod: GP | Performed by: PHYSICAL THERAPIST

## 2017-04-06 PROCEDURE — 40000767 ZZHC STATISTIC PT CONCUSSION VISIT: Performed by: PHYSICAL THERAPIST

## 2017-04-06 PROCEDURE — 97110 THERAPEUTIC EXERCISES: CPT | Mod: GP | Performed by: PHYSICAL THERAPIST

## 2017-04-06 NOTE — IP AVS SNAPSHOT
MRN:[id number]                      After Visit Summary   4/6/2017    Mariel Ribeiro    MRN: [id number]           Visit Information        Provider Department      4/6/2017  1:00 PM Nicole Hameed, PT [facility], [place], Physical Therapy - Outpatient        Your next 10 appointments already scheduled     Apr 07, 2017  1:20 PM CDT   Office Visit with Rafaela William MD   [facility] ([facility])    [address] [place] [id number] [phone]           Bring a current list of meds and any records pertaining to this visit.  For Physicals, please bring immunization records and any forms needing to be filled out.  Please arrive 10 minutes early to complete paperwork.            Apr 13, 2017  1:00 PM CDT   Treatment 45 with Yumiko Aylaa OT   [facility], [place], Occupational Therapy - Outpatient ([facility])    [address]  [place] [address]   [phone]            Apr 13, 2017  1:45 PM CDT   Treatment 45 with Liudmila Harper, PT   [facility], [place], Physical Therapy - Outpatient ([facility])    [address]  [place] [address]   [phone]                Further instructions from your care team       4/6/17    Maybe try audio book with reading - to ease the eyes.  You can listen and read at same time.    If you walk outside with the walker - have Odalis walk with you with Rimma dog.   Walk to [facility] - rest there and come back.  One block total one way - then back.      Report back to Tracie next week.    Galileo henao.  Nicole  PT          MyChart Information     [employer] gives you secure access to your electronic health record. If you see a primary care provider, you can also send messages to your care team and make appointments. If you have questions, please call your primary care clinic.  If you do not have a primary care  provider, please call [phone] and they will assist you.        Care EveryWhere ID     This is your Care EveryWhere ID. This could be used by other organizations to access your [place] medical records  [id number]

## 2017-04-06 NOTE — DISCHARGE INSTRUCTIONS
4/6/17    Maybe try audio book with reading - to ease the eyes.  You can listen and read at same time.    If you walk outside with the walker - have Odalis walk with you with Rimma dog.   Walk to [facility] - rest there and come back.  One block total one way - then back.      Report back to Tracie next week.    Good luck.  Nicole  PT

## 2017-04-07 ENCOUNTER — OFFICE VISIT (OUTPATIENT)
Dept: FAMILY MEDICINE | Facility: CLINIC | Age: 72
End: 2017-04-07
Payer: MEDICARE

## 2017-04-07 VITALS
HEART RATE: 83 BPM | DIASTOLIC BLOOD PRESSURE: 71 MMHG | RESPIRATION RATE: 16 BRPM | SYSTOLIC BLOOD PRESSURE: 114 MMHG | OXYGEN SATURATION: 96 % | BODY MASS INDEX: 49.76 KG/M2 | TEMPERATURE: 97.9 F | WEIGHT: 293 LBS

## 2017-04-07 DIAGNOSIS — E11.22 TYPE 2 DIABETES MELLITUS WITH CHRONIC KIDNEY DISEASE, WITHOUT LONG-TERM CURRENT USE OF INSULIN, UNSPECIFIED CKD STAGE (H): ICD-10-CM

## 2017-04-07 DIAGNOSIS — I77.9 BILATERAL CAROTID ARTERY DISEASE (H): ICD-10-CM

## 2017-04-07 DIAGNOSIS — R22.1 MASS OF NECK: ICD-10-CM

## 2017-04-07 DIAGNOSIS — J45.901 ASTHMA EXACERBATION: Primary | ICD-10-CM

## 2017-04-07 DIAGNOSIS — D69.6 THROMBOCYTOPENIA (H): ICD-10-CM

## 2017-04-07 PROCEDURE — 99214 OFFICE O/P EST MOD 30 MIN: CPT | Performed by: FAMILY MEDICINE

## 2017-04-07 RX ORDER — MORPHINE SULFATE 15 MG/1
15 TABLET ORAL 2 TIMES DAILY
Qty: 60 TABLET | Refills: 0 | Status: SHIPPED | OUTPATIENT
Start: 2017-06-06 | End: 2017-07-06

## 2017-04-07 RX ORDER — MORPHINE SULFATE 15 MG/1
15 TABLET ORAL 2 TIMES DAILY
Qty: 60 TABLET | Refills: 0 | Status: SHIPPED | OUTPATIENT
Start: 2017-05-07 | End: 2017-04-07

## 2017-04-07 RX ORDER — METHYLPREDNISOLONE 4 MG
TABLET, DOSE PACK ORAL
Qty: 21 TABLET | Refills: 0 | Status: ON HOLD | OUTPATIENT
Start: 2017-04-07 | End: 2017-08-08

## 2017-04-07 RX ORDER — MORPHINE SULFATE 15 MG/1
15 TABLET ORAL 2 TIMES DAILY
Qty: 60 TABLET | Refills: 0 | Status: SHIPPED | OUTPATIENT
Start: 2017-04-07 | End: 2017-04-07

## 2017-04-07 NOTE — MR AVS SNAPSHOT
After Visit Summary   4/7/2017    Mariel Ribeiro    MRN: [id number]           Patient Information     Date Of Birth          1945        Visit Information        Provider Department      4/7/2017 1:20 PM Rafaela William MD [facility]        Today's Diagnoses     Asthma exacerbation    -  1    Type 2 diabetes mellitus with chronic kidney disease, without long-term current use of insulin, unspecified CKD stage (H)           Follow-ups after your visit        Your next 10 appointments already scheduled     Apr 13, 2017  1:00 PM CDT   Treatment 45 with Yumiko Ayala, OT   [facility] [place], Occupational Therapy - Outpatient ([facility])    [address], [address]  [place] [address]   [phone]            Apr 13, 2017  1:45 PM CDT   Treatment 45 with Liudmila Harper, PT   [facility], Physical Therapy - Outpatient ([facility])    [address], [address]  [place] [address]   [phone]              Who to contact     If you have questions or need follow up information about today's clinic visit or your schedule please contact [facility] directly at [phone].  Normal or non-critical lab and imaging results will be communicated to you by MyChart, letter or phone within 4 business days after the clinic has received the results. If you do not hear from us within 7 days, please contact the clinic through [employer] or phone. If you have a critical or abnormal lab result, we will notify you by phone as soon as possible.  Submit refill requests through [email or web address] or call your pharmacy and they will forward the refill request to us. Please allow 3 business days for your refill to be completed.          Additional Information About Your Visit        [employer] Information     [email or web address] gives you secure access to your electronic health record. If you see a primary  care provider, you can also send messages to your care team and make appointments. If you have questions, please call your primary care clinic.  If you do not have a primary care provider, please call [phone] and they will assist you.        Care EveryWhere ID     This is your Care EveryWhere ID. This could be used by other organizations to access your [place] medical records  [id number]        Your Vitals Were     Pulse Temperature Respirations Pulse Oximetry BMI (Body Mass Index)       83 97.9  F (36.6  C) (Oral) 16 96% 49.76 kg/m2        Blood Pressure from Last 3 Encounters:   04/07/17 114/71   03/08/17 133/65   01/20/17 158/72    Weight from Last 3 Encounters:   04/07/17 (!) 313 lb (142 kg)   03/08/17 (!) 308 lb (139.7 kg)   01/20/17 (!) 312 lb (141.5 kg)              Today, you had the following     No orders found for display         Today's Medication Changes          These changes are accurate as of: 4/7/17 11:59 PM.  If you have any questions, ask your nurse or doctor.               Start taking these medicines.        Dose/Directions    methylPREDNISolone 4 MG tablet   Commonly known as:  MEDROL DOSEPAK   Used for:  Asthma exacerbation   Started by:  Rafaela William MD        Follow package instructions   Quantity:  21 tablet   Refills:  0       morphine 15 MG IR tablet   Commonly known as:  MSIR   Used for:  Asthma exacerbation   Started by:  Rafaela William MD        Dose:  15 mg   Start taking on:  6/6/2017   Take 1 tablet (15 mg) by mouth 2 times daily   Quantity:  60 tablet   Refills:  0         These medicines have changed or have updated prescriptions.        Dose/Directions    polyethylene glycol powder   Commonly known as:  MIRALAX   This may have changed:    - how much to take  - how to take this  - when to take this  - reasons to take this  - additional instructions   Used for:  Drug-induced constipation        Take 17 grams by mouth once to twice daily.   Quantity:  510 g    Refills:  1            Where to get your medicines      These medications were sent to [id number] - [place], [place] - [address] AT [facility]  [address], [place] [address]    Hours:  24-hours Phone:  [phone]     methylPREDNISolone 4 MG tablet    sitagliptin 50 MG tablet         Some of these will need a paper prescription and others can be bought over the counter.  Ask your nurse if you have questions.     Bring a paper prescription for each of these medications     morphine 15 MG IR tablet                Primary Care Provider Office Phone # Fax #    Rafaela William [phone]       [facility] [address]  [place] [address]        Thank you!     Thank you for choosing [facility]  for your care. Our goal is always to provide you with excellent care. Hearing back from our patients is one way we can continue to improve our services. Please take a few minutes to complete the written survey that you may receive in the mail after your visit with us. Thank you!             Your Updated Medication List - Protect others around you: Learn how to safely use, store and throw away your medicines at www.disposemymeds.org.          This list is accurate as of: 4/7/17 11:59 PM.  Always use your most recent med list.                   Brand Name Dispense Instructions for use    albuterol 108 (90 BASE) MCG/ACT Inhaler    albuterol    1 Inhaler    INHALE 1 TO 2 PUFFS EVERY 4 TO 6 HOURS AS NEEDED       aspirin  MG EC tablet     30 tablet    Take 1 tablet by mouth daily.       atorvastatin 40 MG tablet    LIPITOR    90 tablet    Take 1 tablet (40 mg) by mouth daily       blood glucose monitoring test strip    no brand specified    200 each    1 strip by In Vitro route 2 times daily Strips per patient's preference       doxycycline 100 MG capsule    VIBRAMYCIN    20 capsule    Take 1 capsule (100 mg) by  mouth 2 times daily       EPINEPHrine 0.3 MG/0.3ML injection     0.6 mL    Inject 0.3 mLs (0.3 mg) into the muscle once as needed for anaphylaxis       escitalopram 20 MG tablet    LEXAPRO    90 tablet    Take 1 tablet (20 mg) by mouth every morning       febuxostat 40 MG Tabs tablet    ULORIC    90 tablet    Take 1 tablet (40 mg) by mouth every evening       fluticasone 100 MCG/BLIST Aepb    FLOVENT DISKUS    60 Inhaler    Inhale 1 puff into the lungs every 12 hours       folic acid 800 MCG Tabs      Take 800 mcg by mouth daily.       furosemide 40 MG tablet    LASIX    120 tablet    Take 2 tablets (80 mg) by mouth 2 times daily       glimepiride 4 MG tablet    AMARYL    180 tablet    Take 1 tablet (4 mg) by mouth 2 times daily (with meals)       GUAIFENESIN  MG Tbcr     30    Take 600 mg by mouth twice daily       levothyroxine 175 MCG tablet    SYNTHROID/LEVOTHROID    90 tablet    Take 1 tablet (175 mcg) by mouth daily Repeat labs in 4-6 weeks       Vinfolio FINEPOINT LANCETS Misc     100 each    Use to test blood sugars 2 times daily or as directed.       medroxyPROGESTERone 10 MG tablet    PROVERA    90 tablet    Take 1 tablet (10 mg) by mouth daily       metFORMIN 500 MG 24 hr tablet    GLUCOPHAGE-XR    360 tablet    2 tabs after breakfast and supper       methylPREDNISolone 4 MG tablet    MEDROL DOSEPAK    21 tablet    Follow package instructions       Miconazole Nitrate 2 % Powd     100 g    Apply to rash on chest three times daily.  Continue for one week after the rash has resolved.       morphine 15 MG IR tablet   Start taking on:  6/6/2017    MSIR    60 tablet    Take 1 tablet (15 mg) by mouth 2 times daily       niacin 500 MG CR tablet    NIASPAN    180 tablet    Take 1 tablet (500 mg) by mouth 2 times daily       nitroglycerin 0.4 MG sublingual tablet    NITROSTAT    25 tablet    Place 1 tablet (0.4 mg) under the tongue every 5 minutes as needed for chest pain As needed for chest pain.       ONE  TOUCH ULTRA (DEVICES) Misc     1    test blood sugar BID       polyethylene glycol powder    MIRALAX    510 g    Take 17 grams by mouth once to twice daily.       potassium chloride SA 10 MEQ CR tablet    K-DUR/KLOR-CON M    120 tablet    Take 2 tablets (20 mEq) by mouth 2 times daily       predniSONE 20 MG tablet    DELTASONE    5 tablet    Take 1 tablet (20 mg) by mouth daily       pregabalin 100 MG capsule    LYRICA    180 capsule    Take 1 capsule (100 mg) by mouth 2 times daily       senna-docusate 8.6-50 MG per tablet    SENOKOT-S;PERICOLACE    60 tablet    Take 1-2 tablets by mouth 2 times daily as needed for constipation       sitagliptin 50 MG tablet    JANUVIA    90 tablet    Take 1 tablet (50 mg) by mouth daily       tiZANidine 4 MG capsule    ZANAFLEX    90 capsule    1 TABLET DAILY at bed time       traZODone 50 MG tablet    DESYREL    90 tablet    Take 3 tablets (150 mg) by mouth At Bedtime       vitamin D3 5000 UNIT/ML Liqd      Take 10,000 Units by mouth daily

## 2017-04-13 ENCOUNTER — HOSPITAL ENCOUNTER (OUTPATIENT)
Dept: OCCUPATIONAL THERAPY | Facility: CLINIC | Age: 72
Setting detail: THERAPIES SERIES
End: 2017-04-13
Attending: NURSE PRACTITIONER
Payer: MEDICARE

## 2017-04-13 ENCOUNTER — HOSPITAL ENCOUNTER (OUTPATIENT)
Dept: PHYSICAL THERAPY | Facility: CLINIC | Age: 72
Setting detail: THERAPIES SERIES
End: 2017-04-13
Attending: NURSE PRACTITIONER
Payer: MEDICARE

## 2017-04-13 PROCEDURE — 97140 MANUAL THERAPY 1/> REGIONS: CPT | Mod: GP | Performed by: PHYSICAL THERAPIST

## 2017-04-13 PROCEDURE — 40000768 ZZHC STATISTIC OT CONCUSSION VISIT: Performed by: OCCUPATIONAL THERAPIST

## 2017-04-13 PROCEDURE — 97110 THERAPEUTIC EXERCISES: CPT | Mod: GP | Performed by: PHYSICAL THERAPIST

## 2017-04-13 PROCEDURE — 40000767 ZZHC STATISTIC PT CONCUSSION VISIT: Performed by: PHYSICAL THERAPIST

## 2017-04-13 PROCEDURE — 97535 SELF CARE MNGMENT TRAINING: CPT | Mod: GO | Performed by: OCCUPATIONAL THERAPIST

## 2017-04-13 NOTE — DISCHARGE INSTRUCTIONS
Do shoulder exercises with the blue band now.    Walking 2x per day.        Bring your inhaler next time.      Look at your ball size - how many cm diameter?

## 2017-04-13 NOTE — IP AVS SNAPSHOT
MRN:[id number]                      After Visit Summary   4/13/2017    Mariel Ribeiro    MRN: [id number]           Visit Information        Provider Department      4/13/2017  1:45 PM Liudmila Harper, PT [facility] [place], Physical Therapy - Outpatient          Further instructions from your care team       Do shoulder exercises with the blue band now.    Walking 2x per day.        Bring your inhaler next time.        MyChart Information     Spotjournal gives you secure access to your electronic health record. If you see a primary care provider, you can also send messages to your care team and make appointments. If you have questions, please call your primary care clinic.  If you do not have a primary care provider, please call [phone] and they will assist you.        Care EveryWhere ID     This is your Care EveryWhere ID. This could be used by other organizations to access your [place] medical records  [id number]

## 2017-04-28 ENCOUNTER — HOSPITAL ENCOUNTER (OUTPATIENT)
Dept: PHYSICAL THERAPY | Facility: CLINIC | Age: 72
Setting detail: THERAPIES SERIES
End: 2017-04-28
Attending: NURSE PRACTITIONER
Payer: MEDICARE

## 2017-04-28 ENCOUNTER — HOSPITAL ENCOUNTER (OUTPATIENT)
Dept: OCCUPATIONAL THERAPY | Facility: CLINIC | Age: 72
Setting detail: THERAPIES SERIES
End: 2017-04-28
Attending: NURSE PRACTITIONER
Payer: MEDICARE

## 2017-04-28 PROCEDURE — 40000767 ZZHC STATISTIC PT CONCUSSION VISIT: Performed by: PHYSICAL THERAPIST

## 2017-04-28 PROCEDURE — G8978 MOBILITY CURRENT STATUS: HCPCS | Mod: GP,CK | Performed by: PHYSICAL THERAPIST

## 2017-04-28 PROCEDURE — 40000768 ZZHC STATISTIC OT CONCUSSION VISIT: Performed by: OCCUPATIONAL THERAPIST

## 2017-04-28 PROCEDURE — 97535 SELF CARE MNGMENT TRAINING: CPT | Mod: GO | Performed by: OCCUPATIONAL THERAPIST

## 2017-04-28 PROCEDURE — 97112 NEUROMUSCULAR REEDUCATION: CPT | Mod: GP | Performed by: PHYSICAL THERAPIST

## 2017-04-28 PROCEDURE — G8979 MOBILITY GOAL STATUS: HCPCS | Mod: GP,CK | Performed by: PHYSICAL THERAPIST

## 2017-04-28 PROCEDURE — 97140 MANUAL THERAPY 1/> REGIONS: CPT | Mod: GP | Performed by: PHYSICAL THERAPIST

## 2017-04-28 PROCEDURE — 97110 THERAPEUTIC EXERCISES: CPT | Mod: GP | Performed by: PHYSICAL THERAPIST

## 2017-04-28 NOTE — PROGRESS NOTES
Outpatient Physical Therapy Progress Note     Patient: Mariel Ribeiro  : 1945    Beginning/End Dates of Reporting Period:  17 to 2017 (10 visits)    Referring Provider: Jose Manuel Razo NP    Therapy Diagnosis: post-concussive syndrome     Client Self Report: My pneumonia is back - been off of antibiotics for a couple of weeks. My neuropathy is acting up again in my feet. Getting out to walk the dog 5 out of 7 days per week.    Objective Measurements:  Objective Measure: CSA  Details:     Objective Measure: TUG  Details: 17sec w/ 4ww, cane-13.5sec. (17)    Objective Measure: 25' walk  Details: 14.5sec 20 steps. with 4ww. (17)    Objective Measure: Nustep  Details: Seat 9, arms 8, WL 8, 7.5 mins, 458 steps.    Objective Measure: Vitals  Details: After Nustep 412/64, HR 82.    Objective Measure: walking test  Details: 502' with SEC      Outcome Measures (most recent score):  Concussion Symptom Assessment (score out of 90). A higher score indicates greater impairment: 41    Goals:  Goal Identifier TUG   Goal Description Pt to demo low risk for falls as evidenced by TUG <13.5 secs with SEC.   Target Date 17   Date Met  17   Progress:     Goal Identifier 25' walk   Goal Description Pt demo improved efficiency and safety with gait via 25' walk <18.4 sec with SEC (20% improved).   Target Date 17   Date Met  17   Progress:     Goal Identifier Community mobility   Goal Description Pt demo improved activity tolerance via ability to amb >500' with 4WW for safe community mobility to allow for basic shopping on her own.   Target Date 17   Date Met  17   Progress:     Goal Identifier HEP   Goal Description Pt demo indep with HEP for balance, LE strength, endurance for reduction of concussion sx and ongoing wellness.   Target Date 17   Date Met   ONGOING   Progress: Pt's VS have been more stable lately, allowing for potential of being able to do more on her own.  Has HEP for strengthening shoulders, walking program (although with inconsistent compliance).     Goal Identifier CSA   Goal Description Pt demo improved concussion sx to allow for greater engagement in life via CSA <38/90.   Target Date 04/29/17   Date Met   ONGOING   Progress: Pt had lower CSA last week, higher this week. Ongoing education re: concussion sx management.       Progress Toward Goals:   Progress this reporting period: improving activity tolerance. Ongoing neck pain, deconditioning. VS has been more stable in the last month. Pt noting ongoing intermittent difficulty with breathing - encouraged pt to follow up with PCP.    Plan:  Continue therapy per current plan of care.    Discharge:  No

## 2017-04-28 NOTE — DISCHARGE INSTRUCTIONS
Ok to try using your over the door cervical traction again - 1/2 bag, 5-10 mins. First time - have your roommate there.    Keep working on walking - try to do every day!    With neck pain - do cervical retraction/chin tucks frequently.

## 2017-04-28 NOTE — PROGRESS NOTES
[facility]      OUTPATIENT PHYSICAL THERAPY  PLAN OF TREATMENT FOR OUTPATIENT REHABILITATION    Patient's Last Name, First Name, M.I.                YOB: 1945  Mariel Ribeiro                        Provider's Name  [facility] Medical Record No.  [id number]                               Onset Date: 11/11/16   Start of Care Date: 1/30/17   Type:     _X_PT   ___OT   ___SLP Medical Diagnosis: post concussion syndrome                       PT Diagnosis: post-concussive syndrome      _________________________________________________________________________________  Plan of Treatment:    Frequency/Duration: 1x per week, 45 days (anticipate 2 more visits)     Goals:  Goal Identifier TUG   Goal Description Pt to demo low risk for falls as evidenced by TUG <13.5 secs with SEC.   Target Date 04/29/17   Date Met  04/06/17   Progress:      Goal Identifier 25' walk   Goal Description Pt demo improved efficiency and safety with gait via 25' walk <18.4 sec with SEC (20% improved).   Target Date 04/29/17   Date Met  04/06/17   Progress:      Goal Identifier Community mobility   Goal Description Pt demo improved activity tolerance via ability to amb >500' with 4WW for safe community mobility to allow for basic shopping on her own.   Target Date 04/29/17   Date Met  04/28/17   Progress:      Goal Identifier HEP   Goal Description Pt demo indep with HEP for balance, LE strength, endurance for reduction of concussion sx and ongoing wellness.   Target Date 04/29/17   Date Met  ONGOING   Progress: Pt's VS have been more stable lately, allowing for potential of being able to do more on her own. Has HEP for strengthening shoulders, walking program (although with inconsistent compliance).      Goal Identifier CSA   Goal Description Pt demo improved concussion sx to allow for greater engagement in life  via CSA <38/90.   Target Date 04/29/17   Date Met  ONGOING   Progress: Pt had lower CSA last week, higher this week. Ongoing education re: concussion sx management.         Progress Toward Goals:   Progress this reporting period: improving activity tolerance. Ongoing neck pain, deconditioning. VS has been more stable in the last month. Pt noting ongoing intermittent difficulty with breathing - encouraged pt to follow up with PCP.      Certification date from 4/28/17 to 6/11/17.    Liudmila Harper, PT          I CERTIFY THE NEED FOR THESE SERVICES FURNISHED UNDER        THIS PLAN OF TREATMENT AND WHILE UNDER MY CARE     (Physician co-signature of this document indicates review and certification of the therapy plan).                Referring Provider: Jose Manuel Razo NP

## 2017-04-28 NOTE — IP AVS SNAPSHOT
MRN:[id number]                      After Visit Summary   4/28/2017    Mariel Ribeiro    MRN: [id number]           Visit Information        Provider Department      4/28/2017  1:15 PM Liudmila Harper, PT [facility] [place], Physical Therapy - Outpatient        Your next 10 appointments already scheduled     May 04, 2017  1:00 PM CDT   Treatment 45 with Liudmila Harper, PT   [facility] [place], Physical Therapy - Outpatient ([facility])    [address], [address]  [place] [address]   [phone]            May 04, 2017  1:45 PM CDT   Treatment 45 with Yumiko Ayala, OT   [facility] [place], Occupational Therapy - Outpatient ([facility])    [address], [address]  [place] [address]   [phone]            May 11, 2017  1:45 PM CDT   Treatment 45 with Liudmila Harper, PT   [facility] [place], Physical Therapy - Outpatient ([facility])    [address], [address]  [place] [address]   [phone]                Further instructions from your care team       Ok to try using your over the door cervical traction again - 1/2 bag, 5-10 mins. First time - have your roommate there.    Keep working on walking - try to do every day!    With neck pain - do cervical retraction/chin tucks frequently.                [employer] Information     [employer] gives you secure access to your electronic health record. If you see a primary care provider, you can also send messages to your care team and make appointments. If you have questions, please call your primary care clinic.  If you do not have a primary care provider, please call [phone] and they will assist you.        Care EveryWhere ID     This is your Care EveryWhere ID. This could be used by other organizations to access your [place] medical records  [id number]

## 2017-05-04 ENCOUNTER — HOSPITAL ENCOUNTER (OUTPATIENT)
Dept: OCCUPATIONAL THERAPY | Facility: CLINIC | Age: 72
Setting detail: THERAPIES SERIES
End: 2017-05-04
Attending: NURSE PRACTITIONER
Payer: MEDICARE

## 2017-05-04 ENCOUNTER — HOSPITAL ENCOUNTER (OUTPATIENT)
Dept: PHYSICAL THERAPY | Facility: CLINIC | Age: 72
Setting detail: THERAPIES SERIES
End: 2017-05-04
Attending: NURSE PRACTITIONER
Payer: MEDICARE

## 2017-05-04 PROCEDURE — 97530 THERAPEUTIC ACTIVITIES: CPT | Mod: GO | Performed by: OCCUPATIONAL THERAPIST

## 2017-05-04 PROCEDURE — 97110 THERAPEUTIC EXERCISES: CPT | Mod: GP | Performed by: PHYSICAL THERAPIST

## 2017-05-04 PROCEDURE — G8989 SELF CARE D/C STATUS: HCPCS | Mod: GO,CI | Performed by: OCCUPATIONAL THERAPIST

## 2017-05-04 PROCEDURE — G8988 SELF CARE GOAL STATUS: HCPCS | Mod: GO,CI | Performed by: OCCUPATIONAL THERAPIST

## 2017-05-04 PROCEDURE — 40000768 ZZHC STATISTIC OT CONCUSSION VISIT: Performed by: OCCUPATIONAL THERAPIST

## 2017-05-04 PROCEDURE — 97140 MANUAL THERAPY 1/> REGIONS: CPT | Mod: GP | Performed by: PHYSICAL THERAPIST

## 2017-05-04 PROCEDURE — 40000767 ZZHC STATISTIC PT CONCUSSION VISIT: Performed by: PHYSICAL THERAPIST

## 2017-05-04 PROCEDURE — 97535 SELF CARE MNGMENT TRAINING: CPT | Mod: GO,59 | Performed by: OCCUPATIONAL THERAPIST

## 2017-05-05 DIAGNOSIS — I50.32 CHRONIC DIASTOLIC HEART FAILURE (H): ICD-10-CM

## 2017-05-05 RX ORDER — FUROSEMIDE 40 MG
80 TABLET ORAL 2 TIMES DAILY
Qty: 120 TABLET | Refills: 6 | Status: SHIPPED | OUTPATIENT
Start: 2017-05-05 | End: 2017-05-25

## 2017-05-05 NOTE — TELEPHONE ENCOUNTER
Medication Detail      Disp Refills Start End WINSOME   furosemide (LASIX) 40 MG tablet 120 tablet 6 9/22/2016  No   Sig: Take 2 tablets (80 mg) by mouth 2 times daily       Last Office Visit with BETH, P or [facility] prescribing provider: 4-7-17       Potassium   Date Value Ref Range Status   03/08/2017 3.8 3.4 - 5.3 mmol/L Final     Creatinine   Date Value Ref Range Status   03/08/2017 1.19 (H) 0.52 - 1.04 mg/dL Final     BP Readings from Last 3 Encounters:   04/07/17 114/71   03/08/17 133/65   01/20/17 158/72

## 2017-05-05 NOTE — ADDENDUM NOTE
Encounter addended by: Yumiko Ayala OT on: 5/5/2017 10:01 AM<BR>     Actions taken: Charge Capture section accepted, Flowsheet accepted

## 2017-05-05 NOTE — PROGRESS NOTES
05/04/17 1300   Signing Clinician's Name / Credentials   Signing clinician's name / credentials Yumiko Abdullahiuer OTR/L, ATP   Session Number   Session Number 6- Discharge Summary   Progress/Recertification   Recertification Due 05/05/17   OT Medicare Only G-code   G-code Self Care   Self Care   Self Care Goal,  (eval/re-eval & every progress note) CI: 1-19% impairment   Self Care Discharge Status,  (discharge) CI: 1-19% impairment   Discharge Self Care Modifier Rationale Clinical observation   Adult OT Eval Goals   OT Eval Goals (Adult) 1;2;3   OT Goal 1   Goal Identifier Symptom mgmt   Goal Description Patient will verbalize 2-3 strategies to decrease concussion symptom assessment score by 20 points in order to return to her ability to carry out IADL tasks driving, home cares, and ADLS, etc...   Target Date 05/01/17   Date Met 05/05/17   OT Goal 2   Goal Identifier Memory   Goal Description Patient will demonstrate improved memory skills by completing short-term memory tasks in occupational therapy with 85% accuracy using compensatory strategies prn.    Target Date 05/01/17   Date Met 05/05/17   OT Goal 3   Goal Identifier Dyn[employer]ision   Goal Description Patient will demonstrate WFLs UE motor and visual reaction time for return to independent community mobility (crossing the street, driving, etc.) by scoring WNLs on all modes of the Dynavision.    Target Date 05/01/17   Date Met 05/05/17   Subjective Report   Subjective Report I am doing better, I am going to try driving again. I feel like I need to be on my own to implement all these strategies.   Therapeutic Interventions   Therapeutic Interventions Therapeutic Activity   Self Care/Home Management   Minutes 30 Minutes   Skilled Intervention Concussion symptom mgmt, POC review and D/c plans   Patient Response Patient understanding of education, thankfull for all the ideas.  Reports feeling like she needs to get herself on a schedule in order to implement  ideas fully.   Treatment Detail Education and follow up with CSA scores and deficit areas.  Patient reports attempting to do more things daily. Education on ways to improve memory.  Memory is the process of receiving, analyzing, storing and retrieving information as well as retention of information over time.  Discussed factors that affect memory and how to control them.  Exercising helps to promote brain metabolism.  Being creative and having hobbies and interests keeps your brain active and engaged.  Educated in types of games and activities that can be done, as well as computer apps.  Eating well to support brain health, including taking vitamins and being aware of brain enhancing foods.  Staying in check with you senses, as subtle changes can have an impact on memory.  Getting plenty of rest for optimal participation in your day.  Staying connected: social butterflies are more likely to retain their brain vitality.  Maintaining a positive attitude- worrying and anxiety create clutter in your brain leaving little space for learning and remembering.  Educated on strategies for a better memory- memory exercises. 1. Pay attention- stop, look, listen 2. Association- link what is to be remember with something you already know 3. Clustering- breaking info into smaller groups 4. Visualization- creating a mental picture 5. Language based strategies- creating rhymes or first letter combination with something memorable. 6. Rehearse/repeat- repeat out loud 7. Get organized- have fixed locations for items 8. Conscious forgetting- forget trivial details as they clutter your mind.  9. Use external aids, such as: calenders, journals, to do lists, post its, use a cell phone for numbers, pillboxes, timers, mental memos, and bring people with to help remember info.  Used these ideas in relation to patients daily life, with specific examples to make it realistic and usable.      Progress goals partially met.   Therapeutic Activity    Minutes 15 Minutes   Skilled Intervention Otoniel for reaction time in prep for driving   Patient Response Patient reports fatigue and some vision changes with participation   Treatment Detail Mode A: 35, 44,39 and 41 in sitting.  Patient wanting to stop due to not feeling well.  52 is norm   Progress goals partially met, patient reports she feels comfortable to drive short distances.  Suggest talking with Dr. Riddle   Learner Patient   Readiness Acceptance   Method Demonstration;Explanation   Response Verbalizes understanding;Demonstrates understanding   Comments   Comments Discharge Summary- Patient seen for 6 OT sessions focusing on Concussion mgmt and training.  CSA continues to decrease.  Patient partially met goals but wanting to D/c at this time.     Total Session Time   Total Session Time 45   Electronically signed by:  Yumiko Ayala OTR/L, ATP       Occupational Therapist, Assistive   [phone]      fax: [phone]      [email or web address], [address]  [address]  [place] [address]

## 2017-05-05 NOTE — TELEPHONE ENCOUNTER
Routing refill request to provider for review/approval because:  Labs out of range:    Creatinine   Date Value Ref Range Status   03/08/2017 1.19 (H) 0.52 - 1.04 mg/dL Final   ]

## 2017-05-05 NOTE — PROGRESS NOTES
[facility]      OUTPATIENT OCCUPATIONAL THERAPY  PLAN OF TREATMENT FOR OUTPATIENT REHABILITATION    Patient's Last Name, First Name, M.I.                YOB: 1945  Mariel Ribeiro                        Provider's Name  [facility] Medical Record No.  [id number]                               Onset Date: 1/20/17   Start of Care Date: 1/20/17   Type:     ___PT   _X_OT   ___SLP Medical Diagnosis: Post Concussion syndrome                       OT Diagnosis: concussion symptom severity      _________________________________________________________________________________  Plan of Treatment:    Frequency/Duration: one more session needed     Goals:    Goal Identifier Symptom mgmt   Goal Description Patient will verbalize 2-3 strategies to decrease concussion symptom assessment score by 20 points in order to return to her ability to carry out IADL tasks driving, home cares, and ADLS, etc...   Target Date 05/01/17   Date Met     Progress:     Goal Identifier Memory   Goal Description Patient will demonstrate improved memory skills by completing short-term memory tasks in occupational therapy with 85% accuracy using compensatory strategies prn.    Target Date 05/01/17   Date Met     Progress:     Goal Identifier Dynavision   Goal Description Patient will demonstrate WFLs UE motor and visual reaction time for return to independent community mobility (crossing the street, driving, etc.) by scoring WNLs on all modes of the Dynavision.    Target Date 05/01/17   Date Met     Progress:   Progress Toward Goals:   Auth extension needed to complete schedule sessions to address all goals.  Continued progress noted, CSA continues to decrease.    Certification date from 5/1/17 to 5/4/17.    Yuimko Ayala, OTR/L, ATP         I CERTIFY THE NEED FOR THESE SERVICES FURNISHED UNDER        THIS PLAN OF  TREATMENT AND WHILE UNDER MY CARE     (Physician co-signature of this document indicates review and certification of the therapy plan).                Referring Provider: Jose Manuel Razo

## 2017-05-16 DIAGNOSIS — E11.42 TYPE 2 DIABETES MELLITUS WITH DIABETIC POLYNEUROPATHY, WITHOUT LONG-TERM CURRENT USE OF INSULIN (H): ICD-10-CM

## 2017-05-16 NOTE — TELEPHONE ENCOUNTER
Medication Detail      Disp Refills Start End WINSOME   metFORMIN (GLUCOPHAGE-XR) 500 MG 24 hr tablet 360 tablet 1 10/27/2016  No   Si tabs after breakfast and supper       Last Office Visit with [facility], [facility] or [facility] prescribing provider:  3-8-17        BP Readings from Last 3 Encounters:   17 114/71   17 133/65   17 158/72     Lab Results   Component Value Date    MICROL 20 2017     Lab Results   Component Value Date    UMALCR 19.33 2017     Creatinine   Date Value Ref Range Status   2017 1.19 (H) 0.52 - 1.04 mg/dL Final   ]  GFR Estimate   Date Value Ref Range Status   2017 45 (L) >60 mL/min/1.7m2 Final     Comment:     Non  GFR Calc   2016 42 (L) >60 mL/min/1.7m2 Final     Comment:     Non  GFR Calc   10/27/2016 48 (L) >60 mL/min/1.7m2 Final     Comment:     Non  GFR Calc     GFR Estimate If Black   Date Value Ref Range Status   2017 54 (L) >60 mL/min/1.7m2 Final     Comment:      GFR Calc   2016 51 (L) >60 mL/min/1.7m2 Final     Comment:      GFR Calc   10/27/2016 58 (L) >60 mL/min/1.7m2 Final     Comment:      GFR Calc     Lab Results   Component Value Date    CHOL 186 2017     Lab Results   Component Value Date    HDL 50 2017     Lab Results   Component Value Date    LDL 89 2017     Lab Results   Component Value Date    TRIG 237 2017     Lab Results   Component Value Date    CHOLHDLRATIO 3.2 2015     Lab Results   Component Value Date    AST 18 2016     Lab Results   Component Value Date    ALT 28 2016     Lab Results   Component Value Date    A1C 7.2 2017    A1C 7.3 2016    A1C 8.8 08/10/2016    A1C 7.9 2016    A1C 9.1 2015     Potassium   Date Value Ref Range Status   2017 3.8 3.4 - 5.3 mmol/L Final

## 2017-05-17 ENCOUNTER — HOSPITAL ENCOUNTER (INPATIENT)
Facility: CLINIC | Age: 72
LOS: 2 days | Discharge: HOME | DRG: 445 | End: 2017-05-19
Attending: EMERGENCY MEDICINE | Admitting: INTERNAL MEDICINE
Payer: MEDICARE

## 2017-05-17 ENCOUNTER — RADIANT APPOINTMENT (OUTPATIENT)
Dept: GENERAL RADIOLOGY | Facility: CLINIC | Age: 72
End: 2017-05-17
Attending: FAMILY MEDICINE
Payer: MEDICARE

## 2017-05-17 ENCOUNTER — APPOINTMENT (OUTPATIENT)
Dept: ULTRASOUND IMAGING | Facility: CLINIC | Age: 72
DRG: 445 | End: 2017-05-17
Attending: EMERGENCY MEDICINE
Payer: MEDICARE

## 2017-05-17 ENCOUNTER — OFFICE VISIT (OUTPATIENT)
Dept: FAMILY MEDICINE | Facility: CLINIC | Age: 72
End: 2017-05-17
Payer: MEDICARE

## 2017-05-17 VITALS
RESPIRATION RATE: 18 BRPM | OXYGEN SATURATION: 96 % | WEIGHT: 293 LBS | DIASTOLIC BLOOD PRESSURE: 77 MMHG | TEMPERATURE: 97.6 F | SYSTOLIC BLOOD PRESSURE: 136 MMHG | HEART RATE: 80 BPM | BODY MASS INDEX: 49.76 KG/M2

## 2017-05-17 DIAGNOSIS — R10.11 RUQ ABDOMINAL PAIN: ICD-10-CM

## 2017-05-17 DIAGNOSIS — R10.11 ABDOMINAL PAIN, RIGHT UPPER QUADRANT: ICD-10-CM

## 2017-05-17 DIAGNOSIS — R07.1 CHEST PAIN ON BREATHING: ICD-10-CM

## 2017-05-17 DIAGNOSIS — R06.02 SOB (SHORTNESS OF BREATH): Primary | ICD-10-CM

## 2017-05-17 DIAGNOSIS — K80.61 CALCULUS OF GALLBLADDER AND BILE DUCT WITH CHOLECYSTITIS WITH OBSTRUCTION, UNSPECIFIED CHOLECYSTITIS ACUITY: ICD-10-CM

## 2017-05-17 DIAGNOSIS — R06.02 SOB (SHORTNESS OF BREATH): ICD-10-CM

## 2017-05-17 PROBLEM — K80.20 CHOLELITHIASIS: Status: ACTIVE | Noted: 2017-05-17

## 2017-05-17 LAB
ALBUMIN SERPL-MCNC: 3.2 G/DL (ref 3.4–5)
ALBUMIN UR-MCNC: NEGATIVE MG/DL
ALP SERPL-CCNC: 102 U/L (ref 40–150)
ALT SERPL W P-5'-P-CCNC: 21 U/L (ref 0–50)
ANION GAP SERPL CALCULATED.3IONS-SCNC: 7 MMOL/L (ref 3–14)
APPEARANCE UR: CLEAR
AST SERPL W P-5'-P-CCNC: 18 U/L (ref 0–45)
BASOPHILS # BLD AUTO: 0 10E9/L (ref 0–0.2)
BASOPHILS NFR BLD AUTO: 0.3 %
BILIRUB SERPL-MCNC: 0.6 MG/DL (ref 0.2–1.3)
BILIRUB UR QL STRIP: NEGATIVE
BUN SERPL-MCNC: 19 MG/DL (ref 7–30)
CALCIUM SERPL-MCNC: 9.3 MG/DL (ref 8.5–10.1)
CHLORIDE SERPL-SCNC: 102 MMOL/L (ref 94–109)
CO2 SERPL-SCNC: 32 MMOL/L (ref 20–32)
COLOR UR AUTO: ABNORMAL
CREAT SERPL-MCNC: 1.12 MG/DL (ref 0.52–1.04)
DIFFERENTIAL METHOD BLD: ABNORMAL
EOSINOPHIL # BLD AUTO: 0.2 10E9/L (ref 0–0.7)
EOSINOPHIL NFR BLD AUTO: 2.5 %
ERYTHROCYTE [DISTWIDTH] IN BLOOD BY AUTOMATED COUNT: 13.4 % (ref 10–15)
GFR SERPL CREATININE-BSD FRML MDRD: 48 ML/MIN/1.7M2
GLUCOSE SERPL-MCNC: 146 MG/DL (ref 70–99)
GLUCOSE UR STRIP-MCNC: NEGATIVE MG/DL
HCT VFR BLD AUTO: 39.3 % (ref 35–47)
HGB BLD-MCNC: 12.8 G/DL (ref 11.7–15.7)
HGB UR QL STRIP: NEGATIVE
KETONES UR STRIP-MCNC: NEGATIVE MG/DL
LEUKOCYTE ESTERASE UR QL STRIP: ABNORMAL
LIPASE SERPL-CCNC: 233 U/L (ref 73–393)
LIPASE SERPL-CCNC: 255 U/L (ref 73–393)
LYMPHOCYTES # BLD AUTO: 1.5 10E9/L (ref 0.8–5.3)
LYMPHOCYTES NFR BLD AUTO: 22.7 %
MCH RBC QN AUTO: 27.9 PG (ref 26.5–33)
MCHC RBC AUTO-ENTMCNC: 32.6 G/DL (ref 31.5–36.5)
MCV RBC AUTO: 86 FL (ref 78–100)
MONOCYTES # BLD AUTO: 0.5 10E9/L (ref 0–1.3)
MONOCYTES NFR BLD AUTO: 7.7 %
MUCOUS THREADS #/AREA URNS LPF: PRESENT /LPF
NEUTROPHILS # BLD AUTO: 4.5 10E9/L (ref 1.6–8.3)
NEUTROPHILS NFR BLD AUTO: 66.8 %
NITRATE UR QL: NEGATIVE
PH UR STRIP: 6.5 PH (ref 5–7)
PLATELET # BLD AUTO: 120 10E9/L (ref 150–450)
POTASSIUM SERPL-SCNC: 3.6 MMOL/L (ref 3.4–5.3)
PROT SERPL-MCNC: 6.6 G/DL (ref 6.8–8.8)
RADIOLOGIST FLAGS: ABNORMAL
RBC # BLD AUTO: 4.58 10E12/L (ref 3.8–5.2)
RBC #/AREA URNS AUTO: <1 /HPF (ref 0–2)
SODIUM SERPL-SCNC: 141 MMOL/L (ref 133–144)
SP GR UR STRIP: 1.01 (ref 1–1.03)
SQUAMOUS #/AREA URNS AUTO: 1 /HPF (ref 0–1)
TRANS CELLS #/AREA URNS HPF: 1 /HPF (ref 0–1)
TROPONIN I SERPL-MCNC: NORMAL UG/L (ref 0–0.04)
URN SPEC COLLECT METH UR: ABNORMAL
UROBILINOGEN UR STRIP-MCNC: NORMAL MG/DL (ref 0–2)
WBC # BLD AUTO: 6.8 10E9/L (ref 4–11)
WBC #/AREA URNS AUTO: 4 /HPF (ref 0–2)

## 2017-05-17 PROCEDURE — 25000128 H RX IP 250 OP 636: Performed by: EMERGENCY MEDICINE

## 2017-05-17 PROCEDURE — 36415 COLL VENOUS BLD VENIPUNCTURE: CPT | Performed by: FAMILY MEDICINE

## 2017-05-17 PROCEDURE — 83690 ASSAY OF LIPASE: CPT | Performed by: EMERGENCY MEDICINE

## 2017-05-17 PROCEDURE — 25000132 ZZH RX MED GY IP 250 OP 250 PS 637: Mod: GY | Performed by: PHYSICIAN ASSISTANT

## 2017-05-17 PROCEDURE — 12000001 ZZH R&B MED SURG/OB UMMC

## 2017-05-17 PROCEDURE — 99285 EMERGENCY DEPT VISIT HI MDM: CPT | Mod: Z6 | Performed by: EMERGENCY MEDICINE

## 2017-05-17 PROCEDURE — 83690 ASSAY OF LIPASE: CPT | Performed by: FAMILY MEDICINE

## 2017-05-17 PROCEDURE — 99223 1ST HOSP IP/OBS HIGH 75: CPT | Mod: AI | Performed by: INTERNAL MEDICINE

## 2017-05-17 PROCEDURE — 93005 ELECTROCARDIOGRAM TRACING: CPT | Performed by: EMERGENCY MEDICINE

## 2017-05-17 PROCEDURE — 96374 THER/PROPH/DIAG INJ IV PUSH: CPT | Performed by: EMERGENCY MEDICINE

## 2017-05-17 PROCEDURE — 99285 EMERGENCY DEPT VISIT HI MDM: CPT | Mod: 25 | Performed by: EMERGENCY MEDICINE

## 2017-05-17 PROCEDURE — 71020 XR CHEST 2 VW: CPT

## 2017-05-17 PROCEDURE — 85379 FIBRIN DEGRADATION QUANT: CPT | Performed by: FAMILY MEDICINE

## 2017-05-17 PROCEDURE — 84484 ASSAY OF TROPONIN QUANT: CPT | Performed by: EMERGENCY MEDICINE

## 2017-05-17 PROCEDURE — 99207 ZZC OFFICE-HOSPITAL ADMIT: CPT | Performed by: FAMILY MEDICINE

## 2017-05-17 PROCEDURE — 81001 URINALYSIS AUTO W/SCOPE: CPT | Performed by: EMERGENCY MEDICINE

## 2017-05-17 PROCEDURE — 76705 ECHO EXAM OF ABDOMEN: CPT

## 2017-05-17 PROCEDURE — 80053 COMPREHEN METABOLIC PANEL: CPT | Performed by: EMERGENCY MEDICINE

## 2017-05-17 PROCEDURE — A9270 NON-COVERED ITEM OR SERVICE: HCPCS | Mod: GY | Performed by: PHYSICIAN ASSISTANT

## 2017-05-17 PROCEDURE — 80053 COMPREHEN METABOLIC PANEL: CPT | Performed by: FAMILY MEDICINE

## 2017-05-17 PROCEDURE — 85025 COMPLETE CBC W/AUTO DIFF WBC: CPT | Performed by: FAMILY MEDICINE

## 2017-05-17 RX ORDER — POTASSIUM CHLORIDE 750 MG/1
20 TABLET, EXTENDED RELEASE ORAL 2 TIMES DAILY
Status: DISCONTINUED | OUTPATIENT
Start: 2017-05-17 | End: 2017-05-19 | Stop reason: HOSPADM

## 2017-05-17 RX ORDER — NIACIN 500 MG/1
500 TABLET, EXTENDED RELEASE ORAL 2 TIMES DAILY
Status: DISCONTINUED | OUTPATIENT
Start: 2017-05-17 | End: 2017-05-19 | Stop reason: HOSPADM

## 2017-05-17 RX ORDER — ALBUTEROL SULFATE 90 UG/1
2 AEROSOL, METERED RESPIRATORY (INHALATION) EVERY 6 HOURS PRN
Status: DISCONTINUED | OUTPATIENT
Start: 2017-05-17 | End: 2017-05-17

## 2017-05-17 RX ORDER — ATORVASTATIN CALCIUM 40 MG/1
40 TABLET, FILM COATED ORAL DAILY
Status: DISCONTINUED | OUTPATIENT
Start: 2017-05-18 | End: 2017-05-17

## 2017-05-17 RX ORDER — MORPHINE SULFATE 15 MG/1
15 TABLET ORAL DAILY PRN
Status: DISCONTINUED | OUTPATIENT
Start: 2017-05-17 | End: 2017-05-19 | Stop reason: HOSPADM

## 2017-05-17 RX ORDER — FOLIC ACID 0.8 MG
800 TABLET ORAL DAILY
Status: DISCONTINUED | OUTPATIENT
Start: 2017-05-17 | End: 2017-05-17

## 2017-05-17 RX ORDER — MEDROXYPROGESTERONE ACETATE 10 MG
10 TABLET ORAL DAILY
Status: DISCONTINUED | OUTPATIENT
Start: 2017-05-18 | End: 2017-05-19 | Stop reason: HOSPADM

## 2017-05-17 RX ORDER — LIDOCAINE 50 MG/G
2 PATCH TOPICAL
Status: DISCONTINUED | OUTPATIENT
Start: 2017-05-17 | End: 2017-05-19 | Stop reason: HOSPADM

## 2017-05-17 RX ORDER — GUAIFENESIN 600 MG/1
600 TABLET, EXTENDED RELEASE ORAL 2 TIMES DAILY
Status: DISCONTINUED | OUTPATIENT
Start: 2017-05-17 | End: 2017-05-19 | Stop reason: HOSPADM

## 2017-05-17 RX ORDER — MORPHINE SULFATE 15 MG/1
15 TABLET ORAL 2 TIMES DAILY
Status: DISCONTINUED | OUTPATIENT
Start: 2017-05-17 | End: 2017-05-19 | Stop reason: HOSPADM

## 2017-05-17 RX ORDER — FUROSEMIDE 40 MG
80 TABLET ORAL 2 TIMES DAILY
Status: DISCONTINUED | OUTPATIENT
Start: 2017-05-17 | End: 2017-05-19 | Stop reason: HOSPADM

## 2017-05-17 RX ORDER — PREGABALIN 100 MG/1
100 CAPSULE ORAL 2 TIMES DAILY
Status: DISCONTINUED | OUTPATIENT
Start: 2017-05-17 | End: 2017-05-19 | Stop reason: HOSPADM

## 2017-05-17 RX ORDER — ACETAMINOPHEN 325 MG/1
650 TABLET ORAL EVERY 4 HOURS PRN
Status: DISCONTINUED | OUTPATIENT
Start: 2017-05-17 | End: 2017-05-19 | Stop reason: HOSPADM

## 2017-05-17 RX ORDER — ONDANSETRON 4 MG/1
4 TABLET, ORALLY DISINTEGRATING ORAL EVERY 6 HOURS PRN
Status: DISCONTINUED | OUTPATIENT
Start: 2017-05-17 | End: 2017-05-19 | Stop reason: HOSPADM

## 2017-05-17 RX ORDER — NICOTINE POLACRILEX 4 MG
15-30 LOZENGE BUCCAL
Status: DISCONTINUED | OUTPATIENT
Start: 2017-05-17 | End: 2017-05-19 | Stop reason: HOSPADM

## 2017-05-17 RX ORDER — LEVOTHYROXINE SODIUM 175 UG/1
175 TABLET ORAL DAILY
Status: DISCONTINUED | OUTPATIENT
Start: 2017-05-18 | End: 2017-05-19 | Stop reason: HOSPADM

## 2017-05-17 RX ORDER — AMOXICILLIN 250 MG
1-2 CAPSULE ORAL 2 TIMES DAILY PRN
Status: DISCONTINUED | OUTPATIENT
Start: 2017-05-17 | End: 2017-05-19 | Stop reason: HOSPADM

## 2017-05-17 RX ORDER — DEXTROSE MONOHYDRATE 25 G/50ML
25-50 INJECTION, SOLUTION INTRAVENOUS
Status: DISCONTINUED | OUTPATIENT
Start: 2017-05-17 | End: 2017-05-19 | Stop reason: HOSPADM

## 2017-05-17 RX ORDER — ESCITALOPRAM OXALATE 20 MG/1
20 TABLET ORAL EVERY MORNING
Status: DISCONTINUED | OUTPATIENT
Start: 2017-05-18 | End: 2017-05-19 | Stop reason: HOSPADM

## 2017-05-17 RX ORDER — LANOLIN ALCOHOL/MO/W.PET/CERES
800 CREAM (GRAM) TOPICAL DAILY
Status: DISCONTINUED | OUTPATIENT
Start: 2017-05-18 | End: 2017-05-19 | Stop reason: HOSPADM

## 2017-05-17 RX ORDER — HYDROMORPHONE HYDROCHLORIDE 1 MG/ML
0.5 INJECTION, SOLUTION INTRAMUSCULAR; INTRAVENOUS; SUBCUTANEOUS
Status: COMPLETED | OUTPATIENT
Start: 2017-05-17 | End: 2017-05-17

## 2017-05-17 RX ORDER — NALOXONE HYDROCHLORIDE 0.4 MG/ML
.1-.4 INJECTION, SOLUTION INTRAMUSCULAR; INTRAVENOUS; SUBCUTANEOUS
Status: DISCONTINUED | OUTPATIENT
Start: 2017-05-17 | End: 2017-05-19 | Stop reason: HOSPADM

## 2017-05-17 RX ORDER — MEDROXYPROGESTERONE ACETATE 10 MG
10 TABLET ORAL DAILY
Status: DISCONTINUED | OUTPATIENT
Start: 2017-05-17 | End: 2017-05-17

## 2017-05-17 RX ORDER — NITROGLYCERIN 0.4 MG/1
0.4 TABLET SUBLINGUAL EVERY 5 MIN PRN
Status: DISCONTINUED | OUTPATIENT
Start: 2017-05-17 | End: 2017-05-19 | Stop reason: HOSPADM

## 2017-05-17 RX ORDER — POLYETHYLENE GLYCOL 3350 17 G/17G
17 POWDER, FOR SOLUTION ORAL DAILY PRN
Status: DISCONTINUED | OUTPATIENT
Start: 2017-05-17 | End: 2017-05-19 | Stop reason: HOSPADM

## 2017-05-17 RX ORDER — ONDANSETRON 2 MG/ML
4 INJECTION INTRAMUSCULAR; INTRAVENOUS EVERY 6 HOURS PRN
Status: DISCONTINUED | OUTPATIENT
Start: 2017-05-17 | End: 2017-05-19 | Stop reason: HOSPADM

## 2017-05-17 RX ORDER — ATORVASTATIN CALCIUM 40 MG/1
40 TABLET, FILM COATED ORAL DAILY
Status: DISCONTINUED | OUTPATIENT
Start: 2017-05-17 | End: 2017-05-19 | Stop reason: HOSPADM

## 2017-05-17 RX ADMIN — TRAZODONE HYDROCHLORIDE 150 MG: 100 TABLET ORAL at 21:14

## 2017-05-17 RX ADMIN — MORPHINE SULFATE 15 MG: 15 TABLET ORAL at 21:14

## 2017-05-17 RX ADMIN — PREGABALIN 100 MG: 100 CAPSULE ORAL at 21:14

## 2017-05-17 RX ADMIN — NIACIN 500 MG: 500 TABLET, FILM COATED, EXTENDED RELEASE ORAL at 21:16

## 2017-05-17 RX ADMIN — GUAIFENESIN 600 MG: 600 TABLET, EXTENDED RELEASE ORAL at 21:13

## 2017-05-17 RX ADMIN — LIDOCAINE 2 PATCH: 50 PATCH TOPICAL at 21:14

## 2017-05-17 RX ADMIN — HYDROMORPHONE HYDROCHLORIDE 0.5 MG: 1 INJECTION, SOLUTION INTRAMUSCULAR; INTRAVENOUS; SUBCUTANEOUS at 16:33

## 2017-05-17 RX ADMIN — TIZANIDINE 4 MG: 4 TABLET ORAL at 21:14

## 2017-05-17 ASSESSMENT — ENCOUNTER SYMPTOMS
CHILLS: 1
FEVER: 0
COLOR CHANGE: 0
WHEEZING: 0
SORE THROAT: 0
VOMITING: 0
COUGH: 1
AGITATION: 0
NAUSEA: 0
COUGH: 1
ABDOMINAL PAIN: 1
BACK PAIN: 0
PALPITATIONS: 0
SHORTNESS OF BREATH: 1
SPUTUM PRODUCTION: 0
HEMOPTYSIS: 0
POLYDIPSIA: 0
LIGHT-HEADEDNESS: 0
DIFFICULTY URINATING: 0
DIARRHEA: 0
NECK PAIN: 0
VOMITING: 0
NECK STIFFNESS: 0
SHORTNESS OF BREATH: 0
CHILLS: 0
ABDOMINAL PAIN: 1
BACK PAIN: 1
NAUSEA: 1
DIAPHORESIS: 0
FEVER: 0

## 2017-05-17 ASSESSMENT — ACTIVITIES OF DAILY LIVING (ADL)
BATHING: 1-->ASSISTIVE EQUIPMENT
EATING: 0-->INDEPENDENT
NUMBER_OF_TIMES_PATIENT_HAS_FALLEN_WITHIN_LAST_SIX_MONTHS: 1
COMMUNICATION: 0-->UNDERSTANDS/COMMUNICATES WITHOUT DIFFICULTY
DRESS: 1-->ASSISTIVE EQUIPMENT
TRANSFERRING: 0-->INDEPENDENT
AMBULATION: 1-->ASSISTIVE EQUIPMENT
TRANSFERRING: 1-->ASSISTIVE EQUIPMENT
COGNITION: 0 - NO COGNITION ISSUES REPORTED
BATHING: 1-->ASSISTIVE EQUIPMENT
RETIRED_COMMUNICATION: 0-->UNDERSTANDS/COMMUNICATES WITHOUT DIFFICULTY
AMBULATION: 1-->ASSISTIVE EQUIPMENT
TOILETING: 1-->ASSISTIVE EQUIPMENT
FALL_HISTORY_WITHIN_LAST_SIX_MONTHS: YES
SWALLOWING: 0-->SWALLOWS FOODS/LIQUIDS WITHOUT DIFFICULTY
RETIRED_EATING: 0-->INDEPENDENT
SWALLOWING: 0-->SWALLOWS FOODS/LIQUIDS WITHOUT DIFFICULTY
DRESS: 1-->ASSISTIVE EQUIPMENT
TOILETING: 1-->ASSISTIVE EQUIPMENT
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
## 2017-05-17 NOTE — ED NOTES
Arrived to ED from clinic d/t c/o RUQ abdominal pain that radiates around back, primary MD was recommending an US of gallbladder and pancreas, VSS upon arrival, afebrile
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
## 2017-05-17 NOTE — MR AVS SNAPSHOT
After Visit Summary   5/17/2017    Mariel Ribeiro    MRN: [id number]           Patient Information     Date Of Birth          1945        Visit Information        Provider Department      5/17/2017 10:40 AM Rafaela William MD [facility]        Today's Diagnoses     SOB (shortness of breath)    -  1    Chest pain on breathing        Abdominal pain, right upper quadrant           Follow-ups after your visit        Your next 10 appointments already scheduled     Jun 02, 2017 11:30 AM CDT   Treatment 46 with Liudmila Harper PT   [facility], [place], Physical Therapy - Outpatient ([facility])    [address], [address]  [place] [address]   [phone]              Future tests that were ordered for you today     Open Standing Orders        Priority Remaining Interval Expires Ordered    Activity: Up with assist STAT [id number] PRN  5/17/2017    Daily weights STAT 1/1 DAILY  5/17/2017    Oxygen: Nasal cannula, Oxygen mask STAT [id number] CONTINUOUS  5/17/2017    NPO Time Specified Meds STAT 1/1 DIET EFFECTIVE MIDNIGHT  5/17/2017          Open Future Orders        Priority Expected Expires Ordered    US Abdomen Limited Routine  5/17/2018 5/17/2017            Who to contact     If you have questions or need follow up information about today's clinic visit or your schedule please contact [facility] directly at [phone].  Normal or non-critical lab and imaging results will be communicated to you by MyChart, letter or phone within 4 business days after the clinic has received the results. If you do not hear from us within 7 days, please contact the clinic through MyChart or phone. If you have a critical or abnormal lab result, we will notify you by phone as soon as possible.  Submit refill requests through [employer] or call your pharmacy and they will forward the refill request to us. Please allow 3 business  days for your refill to be completed.          Additional Information About Your Visit        MyChart Information     [employer] gives you secure access to your electronic health record. If you see a primary care provider, you can also send messages to your care team and make appointments. If you have questions, please call your primary care clinic.  If you do not have a primary care provider, please call [phone] and they will assist you.        Care EveryWhere ID     This is your Care EveryWhere ID. This could be used by other organizations to access your [facility] medical records  [id number]        Your Vitals Were     Pulse Temperature Respirations Pulse Oximetry BMI (Body Mass Index)       80 97.6  F (36.4  C) (Oral) 18 96% 49.76 kg/m2        Blood Pressure from Last 3 Encounters:   05/17/17 139/57   05/17/17 136/77   04/07/17 114/71    Weight from Last 3 Encounters:   05/17/17 (!) 313 lb 0.9 oz (142 kg)   05/17/17 (!) 313 lb (142 kg)   04/07/17 (!) 313 lb (142 kg)              We Performed the Following     CBC with platelets and differential     Comprehensive metabolic panel     D dimer, quantitative     Lipase          Today's Medication Changes          These changes are accurate as of: 5/17/17  1:20 PM.  If you have any questions, ask your nurse or doctor.               These medicines have changed or have updated prescriptions.        Dose/Directions    polyethylene glycol powder   Commonly known as:  MIRALAX   This may have changed:    - how much to take  - how to take this  - when to take this  - reasons to take this  - additional instructions   Used for:  Drug-induced constipation        Take 17 grams by mouth once to twice daily.   Quantity:  510 g   Refills:  1                Primary Care Provider Office Phone # Fax #    Rafaela William [phone]       [place] [address]  [place] [address]        Thank you!     Thank you for choosing [facility]  for your care. Our goal is always to provide you with excellent care. Hearing back from our patients is one way we can continue to improve our services. Please take a few minutes to complete the written survey that you may receive in the mail after your visit with us. Thank you!             Your Updated Medication List - Protect others around you: Learn how to safely use, store and throw away your medicines at www.disposemymeds.org.          This list is accurate as of: 5/17/17  1:20 PM.  Always use your most recent med list.                   Brand Name Dispense Instructions for use    albuterol 108 (90 BASE) MCG/ACT Inhaler    albuterol    1 Inhaler    INHALE 1 TO 2 PUFFS EVERY 4 TO 6 HOURS AS NEEDED       aspirin  MG EC tablet     30 tablet    Take 1 tablet by mouth daily.       atorvastatin 40 MG tablet    LIPITOR    90 tablet    TAKE 1 TABLET(40 MG) BY MOUTH DAILY       blood glucose monitoring test strip    no brand specified    200 each    1 strip by In Vitro route 2 times daily Strips per patient's preference       doxycycline 100 MG capsule    VIBRAMYCIN    20 capsule    Take 1 capsule (100 mg) by mouth 2 times daily       EPINEPHrine 0.3 MG/0.3ML injection     0.6 mL    Inject 0.3 mLs (0.3 mg) into the muscle once as needed for anaphylaxis       escitalopram 20 MG tablet    LEXAPRO    90 tablet    Take 1 tablet (20 mg) by mouth every morning       febuxostat 40 MG Tabs tablet    ULORIC    90 tablet    Take 1 tablet (40 mg) by mouth every evening       fluticasone 100 MCG/BLIST Aepb    FLOVENT DISKUS    60 Inhaler    Inhale 1 puff into the lungs every 12 hours       folic acid 800 MCG Tabs      Take 800 mcg by mouth daily.       furosemide 40 MG tablet    LASIX    120 tablet    Take 2 tablets (80 mg) by mouth 2 times daily       glimepiride 4 MG tablet    AMARYL    180 tablet    Take 1 tablet (4 mg) by mouth 2 times daily (with meals)       GUAIFENESIN  MG Tbcr     30     Take 600 mg by mouth twice daily       levothyroxine 175 MCG tablet    SYNTHROID/LEVOTHROID    90 tablet    Take 1 tablet (175 mcg) by mouth daily Repeat labs in 4-6 weeks       Tubis FINEPOINT LANCETS Misc     100 each    Use to test blood sugars 2 times daily or as directed.       medroxyPROGESTERone 10 MG tablet    PROVERA    90 tablet    Take 1 tablet (10 mg) by mouth daily       metFORMIN 500 MG 24 hr tablet    GLUCOPHAGE-XR    360 tablet    2 tabs after breakfast and supper       methylPREDNISolone 4 MG tablet    MEDROL DOSEPAK    21 tablet    Follow package instructions       Miconazole Nitrate 2 % Powd     100 g    Apply to rash on chest three times daily.  Continue for one week after the rash has resolved.       morphine 15 MG IR tablet   Start taking on:  6/6/2017    MSIR    60 tablet    Take 1 tablet (15 mg) by mouth 2 times daily       niacin 500 MG CR tablet    NIASPAN    180 tablet    Take 1 tablet (500 mg) by mouth 2 times daily       nitroglycerin 0.4 MG sublingual tablet    NITROSTAT    25 tablet    Place 1 tablet (0.4 mg) under the tongue every 5 minutes as needed for chest pain As needed for chest pain.       ONE TOUCH ULTRA (DEVICES) Misc     1    test blood sugar BID       polyethylene glycol powder    MIRALAX    510 g    Take 17 grams by mouth once to twice daily.       potassium chloride SA 10 MEQ CR tablet    K-DUR/KLOR-CON M    120 tablet    Take 2 tablets (20 mEq) by mouth 2 times daily       predniSONE 20 MG tablet    DELTASONE    5 tablet    Take 1 tablet (20 mg) by mouth daily       pregabalin 100 MG capsule    LYRICA    180 capsule    Take 1 capsule (100 mg) by mouth 2 times daily       senna-docusate 8.6-50 MG per tablet    SENOKOT-S;PERICOLACE    60 tablet    Take 1-2 tablets by mouth 2 times daily as needed for constipation       sitagliptin 50 MG tablet    JANUVIA    90 tablet    Take 1 tablet (50 mg) by mouth daily       * tiZANidine 4 MG capsule    ZANAFLEX    90 capsule    1  TABLET DAILY at bed time       * tiZANidine 4 MG tablet    ZANAFLEX    90 tablet    TAKE 1 TABLET BY MOUTH DAILY AS NEEDED.       traZODone 50 MG tablet    DESYREL    90 tablet    Take 3 tablets (150 mg) by mouth At Bedtime       vitamin D3 5000 UNIT/ML Liqd      Take 10,000 Units by mouth daily       * Notice:  This list has 2 medication(s) that are the same as other medications prescribed for you. Read the directions carefully, and ask your doctor or other care provider to review them with you.

## 2017-05-17 NOTE — IP AVS SNAPSHOT
[address] [address]    Phone:  [phone]                                       After Visit Summary   5/17/2017    Mariel Ribeiro    MRN: [id number]           After Visit Summary Signature Page     I have received my discharge instructions, and my questions have been answered. I have discussed any challenges I see with this plan with the nurse or doctor.    ..........................................................................................................................................  Patient/Patient Representative Signature      ..........................................................................................................................................  Patient Representative Print Name and Relationship to Patient    ..................................................               ................................................  Date                                            Time    ..........................................................................................................................................  Reviewed by Signature/Title    ...................................................              ..............................................  Date                                                            Time

## 2017-05-17 NOTE — IP AVS SNAPSHOT
MRN:[id number]                      After Visit Summary   5/17/2017    Mariel Ribeiro    MRN: [id number]           Thank you!     Thank you for choosing [facility] for your care. Our goal is always to provide you with excellent care. Hearing back from our patients is one way we can continue to improve our services. Please take a few minutes to complete the written survey that you may receive in the mail after you visit with us. Thank you!        Patient Information     Date Of Birth          1945        Designated Caregiver       Most Recent Value    Caregiver    Will someone help with your care after discharge? yes    Name of designated caregiver Odalis    Phone number of caregiver [phone]    Caregiver address declined      About your hospital stay     You were admitted on:  May 17, 2017 You last received care in the:  [address] [facility]    You were discharged on:  May 19, 2017        Reason for your hospital stay       Right Upper Quadrant pain. Unclear cause. ? Related to biliary colic ?            Reason for your hospital stay       RIght upper quadrant pain- resolved unclear cause. Limited biliary colic ?                  Who to Call     For medical emergencies, please call 911.  For non-urgent questions about your medical care, please call your primary care provider or clinic, [phone]          Attending Provider     Provider Specialty    Maurisio Travis MD Emergency Medicine    [facility], Dex Maurice MD Internal Medicine    Enzo Roberts MD Internal Medicine       Primary Care Provider Office Phone # Fax #    Rafaela William [phone]       [address] [place] [address]         When to contact your care team       Call PMD if you have any of the following: worsening or recurrence of RUQ pain. Shortness of breath            When to contact your care team       Abdominal pain recurs or you have fever                   After Care Instructions     Activity       Your activity upon discharge: activity as tolerated            Activity       Your activity upon discharge: activity as tolerated            Diet       Follow this diet upon discharge: Orders Placed This Encounter      Room Service      Moderate Consistent CHO Diet            Diet       Follow this diet upon discharge: Orders Placed This Encounter      Room Service      Moderate Consistent CHO Diet      Diet                  Follow-up Appointments     Adult [facility] Follow-up and recommended labs and tests       Follow up with primary care provider, Rafaela William, within 7 days for hospital follow- up.  The following labs/tests are recommended: CBC,CMP.      Appointments on [place] and/or [facility] (with [facility] or [facility] provider or service). Call [phone] if you haven't heard regarding these appointments within 7 days of discharge.            Adult [facility] Follow-up and recommended labs and tests       Follow up with primary care provider, Rafaela William, within 7 days for hospital follow- up.  The following labs/tests are recommended: CBC,CMP.    Follow up with Dr Roberson (surgery) next available, Chronic Cholecysititis  Followup with Dr Novoa (GI) followup of pancreatic cyst.  Appointments on [place] and/or [facility] (with [facility] or [facility] provider or service). Call [phone] if you haven't heard regarding these appointments within 7 days of discharge.            Follow Up and recommended labs and tests       Follow up with primary care provider, Rafaela William, within 7 days for hospital follow- up.  The following labs/tests are recommended: CBC,CMP.                  Your next 10 appointments already scheduled     Jun 02, 2017 11:30 AM CDT   Treatment 45 with Liudmila Harper, PT   [facility], [place], Physical Therapy - Outpatient ([facility])    [address], [address]  [place] [address]  "  [phone]              Additional Services     Medication Therapy Management Referral       Reason for referral:  on more than 5 medications and managing chronic disease and on more than 10 medications and hospitalized or in the ED in the past 6 months     This service is designed to help you get the most from your medications.  A specially trained pharmacist will work closely with you and your doctors  to solve any problems related to your medications and to help you get the   best results from taking them.      The Medication Therapy Management staff will call you to schedule an appointment.                  Pending Results     Date and Time Order Name Status Description    5/19/2017 1131 Potassium In process             Statement of Approval     Ordered          05/19/17 1330  I have reviewed and agree with all the recommendations and orders detailed in this document.  EFFECTIVE NOW     Approved and electronically signed by:  Enzo Roberts MD             Admission Information     Date & Time Provider Department Dept. Phone    5/17/2017 Enzo Roberts MD [address] [facility] [phone]      Your Vitals Were     Blood Pressure Pulse Temperature Respirations Height Weight    126/40 (BP Location: Left arm) 67 97.1  F (36.2  C) (Oral) 16 1.689 m (5' 6.5\") 139 kg (306 lb 8 oz)    Pulse Oximetry BMI (Body Mass Index)                91% 48.73 kg/m2          [employer] Information     [employer] gives you secure access to your electronic health record. If you see a primary care provider, you can also send messages to your care team and make appointments. If you have questions, please call your primary care clinic.  If you do not have a primary care provider, please call [phone] and they will assist you.        Care EveryWhere ID     This is your Care EveryWhere ID. This could be used by other organizations to access your [place] medical records  [id number]           Review of your medicines      CONTINUE " these medicines which may have CHANGED, or have new prescriptions. If we are uncertain of the size of tablets/capsules you have at home, strength may be listed as something that might have changed.        Dose / Directions    morphine 15 MG IR tablet   Commonly known as:  MSIR   This may have changed:  when to take this   Used for:  Asthma exacerbation        Dose:  15 mg   Start taking on:  6/6/2017   Take 1 tablet (15 mg) by mouth 2 times daily   Quantity:  60 tablet   Refills:  0       polyethylene glycol powder   Commonly known as:  MIRALAX   This may have changed:    - how much to take  - how to take this  - when to take this  - reasons to take this  - additional instructions   Used for:  Drug-induced constipation        Take 17 grams by mouth once to twice daily.   Quantity:  510 g   Refills:  1         CONTINUE these medicines which have NOT CHANGED        Dose / Directions    albuterol 108 (90 BASE) MCG/ACT Inhaler   Commonly known as:  albuterol   Used for:  Chronic obstructive pulmonary disease with acute exacerbation (H)        INHALE 1 TO 2 PUFFS EVERY 4 TO 6 HOURS AS NEEDED   Quantity:  1 Inhaler   Refills:  PRN       aspirin  MG EC tablet        Dose:  325 mg   Take 1 tablet by mouth daily.   Quantity:  30 tablet   Refills:  0       atorvastatin 40 MG tablet   Commonly known as:  LIPITOR   Used for:  Hyperlipidemia with target LDL less than 70        TAKE 1 TABLET(40 MG) BY MOUTH DAILY   Quantity:  90 tablet   Refills:  3       blood glucose monitoring test strip   Commonly known as:  no brand specified   Used for:  Type 2 diabetes mellitus with diabetic chronic kidney disease (H)        Dose:  1 strip   1 strip by In Vitro route 2 times daily Strips per patient's preference   Quantity:  200 each   Refills:  0       doxycycline 100 MG capsule   Commonly known as:  VIBRAMYCIN   Used for:  Chronic obstructive pulmonary disease with acute exacerbation (H)        Dose:  100 mg   Take 1 capsule (100  mg) by mouth 2 times daily   Quantity:  20 capsule   Refills:  0       EPINEPHrine 0.3 MG/0.3ML injection   Used for:  Allergic reaction, subsequent encounter        Dose:  0.3 mg   Inject 0.3 mLs (0.3 mg) into the muscle once as needed for anaphylaxis   Quantity:  0.6 mL   Refills:  0       escitalopram 20 MG tablet   Commonly known as:  LEXAPRO   Used for:  Major depressive disorder, single episode, in partial remission (H)        Dose:  20 mg   Take 1 tablet (20 mg) by mouth every morning   Quantity:  90 tablet   Refills:  3       febuxostat 40 MG Tabs tablet   Commonly known as:  ULORIC   Used for:  Hyperuricemia        Dose:  40 mg   Take 1 tablet (40 mg) by mouth every evening   Quantity:  90 tablet   Refills:  3       fluticasone 100 MCG/BLIST Aepb   Commonly known as:  FLOVENT DISKUS   Used for:  Chronic obstructive pulmonary disease, unspecified COPD type (H)        Dose:  1 puff   Inhale 1 puff into the lungs every 12 hours   Quantity:  60 Inhaler   Refills:  3       folic acid 800 MCG Tabs        Dose:  800 mcg   Take 800 mcg by mouth daily.   Refills:  0       furosemide 40 MG tablet   Commonly known as:  LASIX   Used for:  Chronic diastolic heart failure (H)        Dose:  80 mg   Take 2 tablets (80 mg) by mouth 2 times daily   Quantity:  120 tablet   Refills:  6       glimepiride 4 MG tablet   Commonly known as:  AMARYL   Used for:  Type 2 diabetes mellitus with chronic kidney disease, without long-term current use of insulin, unspecified CKD stage (H)        Dose:  4 mg   Take 1 tablet (4 mg) by mouth 2 times daily (with meals)   Quantity:  180 tablet   Refills:  1       GUAIFENESIN  MG Tbcr        Take 600 mg by mouth twice daily   Quantity:  30   Refills:  0       levothyroxine 175 MCG tablet   Commonly known as:  SYNTHROID/LEVOTHROID   Used for:  Hypothyroidism, unspecified type        Dose:  175 mcg   Take 1 tablet (175 mcg) by mouth daily Repeat labs in 4-6 weeks   Quantity:  90 tablet    Refills:  0       LIFESCAN FINEPOINT LANCETS Misc   Used for:  Type 2 diabetes, HbA1C goal < 8% (H)        Use to test blood sugars 2 times daily or as directed.   Quantity:  100 each   Refills:  prn       medroxyPROGESTERone 10 MG tablet   Commonly known as:  PROVERA   Used for:  Endometrial hyperplasia, simple        Dose:  10 mg   Take 1 tablet (10 mg) by mouth daily   Quantity:  90 tablet   Refills:  3       methylPREDNISolone 4 MG tablet   Commonly known as:  MEDROL DOSEPAK   Used for:  Asthma exacerbation        Follow package instructions   Quantity:  21 tablet   Refills:  0       Miconazole Nitrate 2 % Powd   Used for:  Intertrigo        Apply to rash on chest three times daily.  Continue for one week after the rash has resolved.   Quantity:  100 g   Refills:  3       niacin 500 MG CR tablet   Commonly known as:  NIASPAN   Used for:  Hyperlipidemia with target LDL less than 70        Dose:  500 mg   Take 1 tablet (500 mg) by mouth 2 times daily   Quantity:  180 tablet   Refills:  3       nitroglycerin 0.4 MG sublingual tablet   Commonly known as:  NITROSTAT   Used for:  Coronary atherosclerosis of unspecified type of vessel, native or graft        Dose:  0.4 mg   Place 1 tablet (0.4 mg) under the tongue every 5 minutes as needed for chest pain As needed for chest pain.   Quantity:  25 tablet   Refills:  PRN       ONE TOUCH ULTRA (DEVICES) Misc   Used for:  Type II or unspecified type diabetes mellitus without mention of complication, not stated as uncontrolled        test blood sugar BID   Quantity:  1   Refills:  0       potassium chloride SA 10 MEQ CR tablet   Commonly known as:  K-DUR/KLOR-CON M   Used for:  Hypokalemia, Chronic diastolic heart failure (H)        Dose:  20 mEq   Take 2 tablets (20 mEq) by mouth 2 times daily   Quantity:  120 tablet   Refills:  11       pregabalin 100 MG capsule   Commonly known as:  LYRICA   Used for:  Myalgia        Dose:  100 mg   Take 1 capsule (100 mg) by mouth 2  times daily   Quantity:  180 capsule   Refills:  2       senna-docusate 8.6-50 MG per tablet   Commonly known as:  SENOKOT-S;PERICOLACE   Used for:  Constipation        Dose:  1-2 tablet   Take 1-2 tablets by mouth 2 times daily as needed for constipation   Quantity:  60 tablet   Refills:  0       sitagliptin 50 MG tablet   Commonly known as:  JANUVIA   Used for:  Type 2 diabetes mellitus with chronic kidney disease, without long-term current use of insulin, unspecified CKD stage (H)        Dose:  50 mg   Take 1 tablet (50 mg) by mouth daily   Quantity:  90 tablet   Refills:  3       tiZANidine 4 MG tablet   Commonly known as:  ZANAFLEX   Used for:  Muscle spasm        TAKE 1 TABLET BY MOUTH DAILY AS NEEDED.   Quantity:  90 tablet   Refills:  3       traZODone 50 MG tablet   Commonly known as:  DESYREL   Used for:  Insomnia, unspecified        Dose:  150 mg   Take 3 tablets (150 mg) by mouth At Bedtime   Quantity:  90 tablet   Refills:  7       vitamin D3 5000 UNIT/ML Liqd   Indication:  Liquid gel caps        Dose:  37835 Units   Take 10,000 Units by mouth daily   Refills:  0         STOP taking     metFORMIN 500 MG 24 hr tablet   Commonly known as:  GLUCOPHAGE-XR                    Protect others around you: Learn how to safely use, store and throw away your medicines at www.disposemymeds.org.             Medication List: This is a list of all your medications and when to take them. Check marks below indicate your daily home schedule. Keep this list as a reference.      Medications           Morning Afternoon Evening Bedtime As Needed    albuterol 108 (90 BASE) MCG/ACT Inhaler   Commonly known as:  albuterol   INHALE 1 TO 2 PUFFS EVERY 4 TO 6 HOURS AS NEEDED                                aspirin  MG EC tablet   Take 1 tablet by mouth daily.                                atorvastatin 40 MG tablet   Commonly known as:  LIPITOR   TAKE 1 TABLET(40 MG) BY MOUTH DAILY                                blood glucose  monitoring test strip   Commonly known as:  no brand specified   1 strip by In Vitro route 2 times daily Strips per patient's preference                                doxycycline 100 MG capsule   Commonly known as:  VIBRAMYCIN   Take 1 capsule (100 mg) by mouth 2 times daily                                EPINEPHrine 0.3 MG/0.3ML injection   Inject 0.3 mLs (0.3 mg) into the muscle once as needed for anaphylaxis                                escitalopram 20 MG tablet   Commonly known as:  LEXAPRO   Take 1 tablet (20 mg) by mouth every morning   Last time this was given:  20 mg on 5/19/2017 10:25 AM                                febuxostat 40 MG Tabs tablet   Commonly known as:  ULORIC   Take 1 tablet (40 mg) by mouth every evening                                fluticasone 100 MCG/BLIST Aepb   Commonly known as:  FLOVENT DISKUS   Inhale 1 puff into the lungs every 12 hours                                folic acid 800 MCG Tabs   Take 800 mcg by mouth daily.   Last time this was given:  800 mcg on 5/19/2017  9:15 AM                                furosemide 40 MG tablet   Commonly known as:  LASIX   Take 2 tablets (80 mg) by mouth 2 times daily   Last time this was given:  80 mg on 5/19/2017  9:14 AM                                glimepiride 4 MG tablet   Commonly known as:  AMARYL   Take 1 tablet (4 mg) by mouth 2 times daily (with meals)                                GUAIFENESIN  MG Tbcr   Take 600 mg by mouth twice daily   Last time this was given:  600 mg on 5/19/2017  9:15 AM                                levothyroxine 175 MCG tablet   Commonly known as:  SYNTHROID/LEVOTHROID   Take 1 tablet (175 mcg) by mouth daily Repeat labs in 4-6 weeks   Last time this was given:  175 mcg on 5/19/2017  9:14 AM                                [employer] LANCETS Misc   Use to test blood sugars 2 times daily or as directed.                                medroxyPROGESTERone 10 MG tablet   Commonly known as:   PROVERA   Take 1 tablet (10 mg) by mouth daily                                methylPREDNISolone 4 MG tablet   Commonly known as:  MEDROL DOSEPAK   Follow package instructions                                Miconazole Nitrate 2 % Powd   Apply to rash on chest three times daily.  Continue for one week after the rash has resolved.                                morphine 15 MG IR tablet   Commonly known as:  MSIR   Take 1 tablet (15 mg) by mouth 2 times daily   Start taking on:  6/6/2017   Last time this was given:  15 mg on 5/19/2017  9:15 AM                                niacin 500 MG CR tablet   Commonly known as:  NIASPAN   Take 1 tablet (500 mg) by mouth 2 times daily   Last time this was given:  500 mg on 5/19/2017 10:25 AM                                nitroglycerin 0.4 MG sublingual tablet   Commonly known as:  NITROSTAT   Place 1 tablet (0.4 mg) under the tongue every 5 minutes as needed for chest pain As needed for chest pain.                                ONE TOUCH ULTRA (DEVICES) Misc   test blood sugar BID                                polyethylene glycol powder   Commonly known as:  MIRALAX   Take 17 grams by mouth once to twice daily.                                potassium chloride SA 10 MEQ CR tablet   Commonly known as:  K-DUR/KLOR-CON M   Take 2 tablets (20 mEq) by mouth 2 times daily   Last time this was given:  20 mEq on 5/19/2017 11:25 AM                                pregabalin 100 MG capsule   Commonly known as:  LYRICA   Take 1 capsule (100 mg) by mouth 2 times daily   Last time this was given:  100 mg on 5/19/2017  9:15 AM                                senna-docusate 8.6-50 MG per tablet   Commonly known as:  SENOKOT-S;PERICOLACE   Take 1-2 tablets by mouth 2 times daily as needed for constipation   Last time this was given:  2 tablets on 5/18/2017  7:05 PM                                sitagliptin 50 MG tablet   Commonly known as:  JANUVIA   Take 1 tablet (50 mg) by mouth daily                                 tiZANidine 4 MG tablet   Commonly known as:  ZANAFLEX   TAKE 1 TABLET BY MOUTH DAILY AS NEEDED.   Last time this was given:  4 mg on 5/18/2017 10:54 PM                                traZODone 50 MG tablet   Commonly known as:  DESYREL   Take 3 tablets (150 mg) by mouth At Bedtime   Last time this was given:  150 mg on 5/18/2017 10:54 PM                                vitamin D3 5000 UNIT/ML Liqd   Take 10,000 Units by mouth daily
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
## 2017-05-17 NOTE — NURSING NOTE
"  Chief Complaint   Patient presents with     Back Pain       Initial /77 (BP Location: Right arm, Patient Position: Chair, Cuff Size: Adult Regular)  Pulse 80  Temp 97.6  F (36.4  C) (Oral)  Resp 18  Wt (!) 313 lb (142 kg)  SpO2 96%  BMI 49.76 kg/m2 Estimated body mass index is 49.76 kg/(m^2) as calculated from the following:    Height as of 3/8/17: 5' 6.5\" (1.689 m).    Weight as of this encounter: 313 lb (142 kg).  Medication Reconciliation: unable or not appropriate to perform       Brionna Hernandez MA      "

## 2017-05-18 ENCOUNTER — APPOINTMENT (OUTPATIENT)
Dept: NUCLEAR MEDICINE | Facility: CLINIC | Age: 72
DRG: 445 | End: 2017-05-18
Attending: PHYSICIAN ASSISTANT
Payer: MEDICARE

## 2017-05-18 LAB
ALBUMIN SERPL-MCNC: 2.7 G/DL (ref 3.4–5)
ALBUMIN SERPL-MCNC: 3.5 G/DL (ref 3.4–5)
ALP SERPL-CCNC: 105 U/L (ref 40–150)
ALP SERPL-CCNC: 83 U/L (ref 40–150)
ALT SERPL W P-5'-P-CCNC: 17 U/L (ref 0–50)
ALT SERPL W P-5'-P-CCNC: 22 U/L (ref 0–50)
ANION GAP SERPL CALCULATED.3IONS-SCNC: 8 MMOL/L (ref 3–14)
ANION GAP SERPL CALCULATED.3IONS-SCNC: 9 MMOL/L (ref 3–14)
AST SERPL W P-5'-P-CCNC: 15 U/L (ref 0–45)
AST SERPL W P-5'-P-CCNC: 19 U/L (ref 0–45)
BILIRUB SERPL-MCNC: 0.4 MG/DL (ref 0.2–1.3)
BILIRUB SERPL-MCNC: 0.5 MG/DL (ref 0.2–1.3)
BUN SERPL-MCNC: 19 MG/DL (ref 7–30)
BUN SERPL-MCNC: 19 MG/DL (ref 7–30)
CALCIUM SERPL-MCNC: 8.7 MG/DL (ref 8.5–10.1)
CALCIUM SERPL-MCNC: 9.2 MG/DL (ref 8.5–10.1)
CHLORIDE SERPL-SCNC: 103 MMOL/L (ref 94–109)
CHLORIDE SERPL-SCNC: 105 MMOL/L (ref 94–109)
CO2 SERPL-SCNC: 28 MMOL/L (ref 20–32)
CO2 SERPL-SCNC: 29 MMOL/L (ref 20–32)
CREAT SERPL-MCNC: 1.06 MG/DL (ref 0.52–1.04)
CREAT SERPL-MCNC: 1.13 MG/DL (ref 0.52–1.04)
D DIMER PPP FEU-MCNC: 1.1 UG/ML FEU (ref 0–0.5)
ERYTHROCYTE [DISTWIDTH] IN BLOOD BY AUTOMATED COUNT: 13.6 % (ref 10–15)
GFR SERPL CREATININE-BSD FRML MDRD: 47 ML/MIN/1.7M2
GFR SERPL CREATININE-BSD FRML MDRD: 51 ML/MIN/1.7M2
GLUCOSE BLDC GLUCOMTR-MCNC: 113 MG/DL (ref 70–99)
GLUCOSE BLDC GLUCOMTR-MCNC: 115 MG/DL (ref 70–99)
GLUCOSE BLDC GLUCOMTR-MCNC: 154 MG/DL (ref 70–99)
GLUCOSE BLDC GLUCOMTR-MCNC: 157 MG/DL (ref 70–99)
GLUCOSE SERPL-MCNC: 140 MG/DL (ref 70–99)
GLUCOSE SERPL-MCNC: 185 MG/DL (ref 70–99)
HCT VFR BLD AUTO: 37.3 % (ref 35–47)
HGB BLD-MCNC: 12 G/DL (ref 11.7–15.7)
INR PPP: 1.04 (ref 0.86–1.14)
INTERPRETATION ECG - MUSE: NORMAL
LIPASE SERPL-CCNC: 187 U/L (ref 73–393)
MAGNESIUM SERPL-MCNC: 1.7 MG/DL (ref 1.6–2.3)
MCH RBC QN AUTO: 27.2 PG (ref 26.5–33)
MCHC RBC AUTO-ENTMCNC: 32.2 G/DL (ref 31.5–36.5)
MCV RBC AUTO: 85 FL (ref 78–100)
PHOSPHATE SERPL-MCNC: NORMAL MG/DL (ref 2.5–4.5)
PLATELET # BLD AUTO: 95 10E9/L (ref 150–450)
POTASSIUM SERPL-SCNC: 3.2 MMOL/L (ref 3.4–5.3)
POTASSIUM SERPL-SCNC: 3.9 MMOL/L (ref 3.4–5.3)
PROT SERPL-MCNC: 5.8 G/DL (ref 6.8–8.8)
PROT SERPL-MCNC: 6.7 G/DL (ref 6.8–8.8)
RBC # BLD AUTO: 4.41 10E12/L (ref 3.8–5.2)
SODIUM SERPL-SCNC: 140 MMOL/L (ref 133–144)
SODIUM SERPL-SCNC: 142 MMOL/L (ref 133–144)
T4 FREE SERPL-MCNC: 1.36 NG/DL (ref 0.76–1.46)
TSH SERPL DL<=0.005 MIU/L-ACNC: 0.25 MU/L (ref 0.4–4)
WBC # BLD AUTO: 4.8 10E9/L (ref 4–11)

## 2017-05-18 PROCEDURE — 36415 COLL VENOUS BLD VENIPUNCTURE: CPT | Performed by: PHYSICIAN ASSISTANT

## 2017-05-18 PROCEDURE — 12000001 ZZH R&B MED SURG/OB UMMC

## 2017-05-18 PROCEDURE — A9537 TC99M MEBROFENIN: HCPCS | Performed by: INTERNAL MEDICINE

## 2017-05-18 PROCEDURE — A9270 NON-COVERED ITEM OR SERVICE: HCPCS | Mod: GY | Performed by: PHYSICIAN ASSISTANT

## 2017-05-18 PROCEDURE — 85610 PROTHROMBIN TIME: CPT | Performed by: PHYSICIAN ASSISTANT

## 2017-05-18 PROCEDURE — 80053 COMPREHEN METABOLIC PANEL: CPT | Performed by: PHYSICIAN ASSISTANT

## 2017-05-18 PROCEDURE — 84443 ASSAY THYROID STIM HORMONE: CPT | Performed by: PHYSICIAN ASSISTANT

## 2017-05-18 PROCEDURE — 00000146 ZZHCL STATISTIC GLUCOSE BY METER IP

## 2017-05-18 PROCEDURE — 78226 HEPATOBILIARY SYSTEM IMAGING: CPT

## 2017-05-18 PROCEDURE — 83690 ASSAY OF LIPASE: CPT | Performed by: PHYSICIAN ASSISTANT

## 2017-05-18 PROCEDURE — 25000125 ZZHC RX 250: Performed by: INTERNAL MEDICINE

## 2017-05-18 PROCEDURE — 25000132 ZZH RX MED GY IP 250 OP 250 PS 637: Mod: GY | Performed by: PHYSICIAN ASSISTANT

## 2017-05-18 PROCEDURE — 34300033 ZZH RX 343: Performed by: INTERNAL MEDICINE

## 2017-05-18 PROCEDURE — 84100 ASSAY OF PHOSPHORUS: CPT | Performed by: PHYSICIAN ASSISTANT

## 2017-05-18 PROCEDURE — 83735 ASSAY OF MAGNESIUM: CPT | Performed by: PHYSICIAN ASSISTANT

## 2017-05-18 PROCEDURE — 85027 COMPLETE CBC AUTOMATED: CPT | Performed by: PHYSICIAN ASSISTANT

## 2017-05-18 PROCEDURE — 84439 ASSAY OF FREE THYROXINE: CPT | Performed by: PHYSICIAN ASSISTANT

## 2017-05-18 RX ORDER — KIT FOR THE PREPARATION OF TECHNETIUM TC 99M MEBROFENIN 45 MG/10ML
4.8-7.2 INJECTION, POWDER, LYOPHILIZED, FOR SOLUTION INTRAVENOUS ONCE
Status: COMPLETED | OUTPATIENT
Start: 2017-05-18 | End: 2017-05-18

## 2017-05-18 RX ADMIN — PREGABALIN 100 MG: 100 CAPSULE ORAL at 10:52

## 2017-05-18 RX ADMIN — LEVOTHYROXINE SODIUM 175 MCG: 175 TABLET ORAL at 18:53

## 2017-05-18 RX ADMIN — MORPHINE SULFATE 15 MG: 15 TABLET ORAL at 20:25

## 2017-05-18 RX ADMIN — TRAZODONE HYDROCHLORIDE 150 MG: 100 TABLET ORAL at 22:54

## 2017-05-18 RX ADMIN — MORPHINE SULFATE 15 MG: 15 TABLET ORAL at 10:52

## 2017-05-18 RX ADMIN — SINCALIDE 2.8 MCG: 5 INJECTION, POWDER, LYOPHILIZED, FOR SOLUTION INTRAVENOUS at 16:24

## 2017-05-18 RX ADMIN — GUAIFENESIN 600 MG: 600 TABLET, EXTENDED RELEASE ORAL at 18:52

## 2017-05-18 RX ADMIN — TIZANIDINE 4 MG: 4 TABLET ORAL at 22:54

## 2017-05-18 RX ADMIN — ESCITALOPRAM OXALATE 20 MG: 20 TABLET ORAL at 18:53

## 2017-05-18 RX ADMIN — SENNOSIDES AND DOCUSATE SODIUM 2 TABLET: 8.6; 5 TABLET ORAL at 19:05

## 2017-05-18 RX ADMIN — ACETAMINOPHEN 800 MCG: 400 TABLET ORAL at 18:54

## 2017-05-18 RX ADMIN — NIACIN 500 MG: 500 TABLET, FILM COATED, EXTENDED RELEASE ORAL at 18:53

## 2017-05-18 RX ADMIN — POTASSIUM CHLORIDE 20 MEQ: 750 TABLET, EXTENDED RELEASE ORAL at 18:54

## 2017-05-18 RX ADMIN — MORPHINE SULFATE 15 MG: 15 TABLET ORAL at 22:54

## 2017-05-18 RX ADMIN — FUROSEMIDE 80 MG: 40 TABLET ORAL at 18:53

## 2017-05-18 RX ADMIN — MEBROFENIN 5.5 MCI.: 45 INJECTION, POWDER, LYOPHILIZED, FOR SOLUTION INTRAVENOUS at 14:15

## 2017-05-18 RX ADMIN — PREGABALIN 100 MG: 100 CAPSULE ORAL at 20:25
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
## 2017-05-18 NOTE — PHARMACY
Admission medication history interview status for the 5/17/2017 admission is complete. See Epic admission navigator for allergy information, pharmacy, prior to admission medications and immunization status.     Medication history interview sources:  Verbal with pharmacist at [facility] home pharmacy    Changes made to PTA medication list (reason)  Added: N/A  Deleted: Tizanidine capsules, prednisone  Changed: Morphine IR tablets    Additional medication history information (including reliability of information, actions taken by pharmacist):  -Patient poor historian  -Stated which pharmacy she got medications through but not able to provide much more information  -Home pharmacy able to verify medications  -Family member was present with patient but did not know anything about the medications      Prior to Admission medications    Medication Sig Last Dose Taking? Auth Provider   furosemide (LASIX) 40 MG tablet Take 2 tablets (80 mg) by mouth 2 times daily 5/17/2017 at Unknown time Yes Rafaela William MD   tiZANidine (ZANAFLEX) 4 MG tablet TAKE 1 TABLET BY MOUTH DAILY AS NEEDED. 5/17/2017 at Unknown time Yes Rafaela William MD   atorvastatin (LIPITOR) 40 MG tablet TAKE 1 TABLET(40 MG) BY MOUTH DAILY 5/17/2017 at Unknown time Yes Rafaela William MD   morphine (MSIR) 15 MG IR tablet Take 1 tablet (15 mg) by mouth 2 times daily  Patient taking differently: Take 15 mg by mouth 3 times daily  5/17/2017 at Unknown time Yes Rafaela William MD   sitagliptin (JANUVIA) 50 MG tablet Take 1 tablet (50 mg) by mouth daily 5/17/2017 at Unknown time Yes Rafaela William MD   pregabalin (LYRICA) 100 MG capsule Take 1 capsule (100 mg) by mouth 2 times daily 5/17/2017 at Unknown time Yes Rafaela William MD   levothyroxine (SYNTHROID/LEVOTHROID) 175 MCG tablet Take 1 tablet (175 mcg) by mouth daily Repeat labs in 4-6 weeks 5/17/2017 at Unknown time Yes Rafaela William MD   albuterol (ALBUTEROL) 108 (90 BASE)  MCG/ACT Inhaler INHALE 1 TO 2 PUFFS EVERY 4 TO 6 HOURS AS NEEDED  Yes Rafaela William MD   febuxostat (ULORIC) 40 MG TABS tablet Take 1 tablet (40 mg) by mouth every evening 5/17/2017 at Unknown time Yes Rafaela William MD   traZODone (DESYREL) 50 MG tablet Take 3 tablets (150 mg) by mouth At Bedtime 5/17/2017 at Unknown time Yes Rafaela William MD   glimepiride (AMARYL) 4 MG tablet Take 1 tablet (4 mg) by mouth 2 times daily (with meals) 5/17/2017 at Unknown time Yes Jc Yao MD   fluticasone (FLOVENT DISKUS) 100 MCG/BLIST AEPB Inhale 1 puff into the lungs every 12 hours 5/17/2017 at Unknown time Yes Rafaela William MD   medroxyPROGESTERone (PROVERA) 10 MG tablet Take 1 tablet (10 mg) by mouth daily 5/17/2017 at Unknown time Yes Stacey Arnold,    potassium chloride SA (K-DUR,KLOR-CON M) 10 MEQ tablet Take 2 tablets (20 mEq) by mouth 2 times daily 5/17/2017 at Unknown time Yes Rafaela William MD   metFORMIN (GLUCOPHAGE-XR) 500 MG 24 hr tablet 2 tabs after breakfast and supper 5/17/2017 at Unknown time Yes Rafaela William MD   escitalopram (LEXAPRO) 20 MG tablet Take 1 tablet (20 mg) by mouth every morning 5/17/2017 at Unknown time Yes Rafaela William MD   niacin (NIASPAN) 500 MG CR tablet Take 1 tablet (500 mg) by mouth 2 times daily 5/17/2017 at Unknown time Yes Rafaela William MD   polyethylene glycol (MIRALAX) powder Take 17 grams by mouth once to twice daily.  Patient taking differently: Take 17 g by mouth 2 times daily as needed for constipation   Yes Rafaela William MD   [employer] FINEPOINT LANCETS MISC Use to test blood sugars 2 times daily or as directed.  Yes Rosina Blount, NP   nitroglycerin (NITROSTAT) 0.4 MG SL tablet Place 1 tablet (0.4 mg) under the tongue every 5 minutes as needed for chest pain As needed for chest pain.  Yes Rosina Blount, NP   aspirin  MG tablet Take 1 tablet by mouth daily. 5/17/2017 at Unknown time  Yes Dakota Shankar MD   Folic Acid 800 MCG TABS Take 800 mcg by mouth daily.  Yes Unknown, Entered By History   Cholecalciferol (VITAMIN D3) 5000 UNIT/ML LIQD Take 10,000 Units by mouth daily   Yes Reported, Patient   methylPREDNISolone (MEDROL DOSEPAK) 4 MG tablet Follow package instructions   Rafaela William MD   doxycycline (VIBRAMYCIN) 100 MG capsule Take 1 capsule (100 mg) by mouth 2 times daily   Rafaela William MD   EPINEPHrine 0.3 MG/0.3ML injection Inject 0.3 mLs (0.3 mg) into the muscle once as needed for anaphylaxis   Rafaela William MD   Miconazole Nitrate 2 % POWD Apply to rash on chest three times daily.  Continue for one week after the rash has resolved.   Rafaela William MD   blood glucose monitoring (NO BRAND SPECIFIED) test strip 1 strip by In Vitro route 2 times daily Strips per patient's preference   Rafaela William MD   senna-docusate (SENOKOT-S;PERICOLACE) 8.6-50 MG per tablet Take 1-2 tablets by mouth 2 times daily as needed for constipation   Meet Pan MD   ONE TOUCH ULTRA (DEVICES) MISC test blood sugar BID   Rosina Blount NP   GUAIFENESIN  MG OR TBCR Take 600 mg by mouth twice daily   Rosina Blount NP         Medication history completed by: David Bustillo PD4

## 2017-05-18 NOTE — PLAN OF CARE
Problem: Goal Outcome Summary  Goal: Goal Outcome Summary  Outcome: No Change  Pt admitted from ED. Glasses, clothing and shoes with pt, all valuables sent home with room mate. Pt admitted for chololithiasis. Pt is A&Ox4. AVSS on RA. R PIV is SL. Pt is SBA w/ cane to bathroom. Bed alarm active and audible. Pt NPO at 0000 for procedure tomorrow.     Continue to manage pain and follow the POC.

## 2017-05-18 NOTE — PLAN OF CARE
Problem: Goal Outcome Summary  Goal: Goal Outcome Summary  Outcome: Improving  D: VSS, Afeb. See [facility] for full assessment. Having a Hidascan currently. Family waiting in room. A&) x 4. Able to ambulate to BR with standby assist. Can eat when she returns. Need to see which meds can be caught up on at that time.  A/P: Stable at present time. Continue routine monitoring.     Carlene Henry R.N.

## 2017-05-18 NOTE — PROGRESS NOTES
Progress Note  Date of Service: May 18, 2017    Mariel Ribeiro MRN# [id number]   YOB: 1945 Age: 71 year old   Admit Date: 5/17/2017     Developments today:  - Await HIDA scan results  - Class IV cardiac risk on RGMI scoring (11%)    Assessment/Plan:  Mariel Ribeiro is a 71 year old female admitted on 5/17/2017. She has a history of COPD, CKD stage III, HTN, diastolic CHF, CAD s/p stent x 2 (2011), HLD, DM Type II, BELEN on CPAP, thyroid cancer s/p partial thyroidectomy (1980's) with subsequent hypothyroidism, chronic pain 2/2 OA, fibromyalgia, chronic lymphedema, migraines, depression, and insomnia and is admitted to internal medicine for acute cholelithiasis.     RUQ pain:  Persistent RUQ abdominal pain over past 4 days. Evaluated in clinic 5/17 by PCP who recommended patient present to the ED for further evaluation. Abd US in ED with cholelithiasis and CBD dilation; no gallbladder wall thickening or pericholecystic fluid. Lipase normal; liver enzymes WNL, no leukocytosis, pt afebrile concerning for cholecystitis. Surgery consulted in ED, note patient is not a surgical candidate at this time; recommend patient obtain HIDA scan. Discussed case with GI who agree with HIDA scan, may consider MRCP if scan is negative. Currently patient with moderate abdominal pain unrelieved with dose of IV dilaudid. Vitals stable with /77, HR 70, RR 18.  - Surgery following  - GI consulted, appreciate recc's  - NPO @ midnight  - HIDA scan in AM  - Pain control with PTA scheduled morphine 15 mg BID, additional PRN dose for breakthrough pain  - Zofran PRN     Pancreatic Cysts. Incidentally noted on abdominal US in ED, described as 2 subcentimeter cystic foci possibly representing simple cysts or side branch IPMN. Surgery and GI are aware of this, plan to discuss during conference tomorrow AM.     DM Type II c/b Peripheral Neuropathy. HgbA1C 7.2% 3/2017. Not on insulin PTA. Utilizes lyrica for neuropathy.   on admission.   - Hold PTA metformin, sitagliptan, glimepiride  - Medium SSI while oral anti-hyperglyemics on hold  - BG checks q4h while NPO  - Continue pregabalin 100 mg BID     CKD Stage III. Diagnosed in 2009. Cr 1.12, GFR 48 in ED; BL Cr ~1.1-1.2 and GFR mid-40's. UA without proteinuria.  - I&O, Daily weights  - Avoid nephrotoxic agents as able  - Trend CMP     Diastolic HFpEF. Echo 7/2012 with moderate diastolic dysfunction with borderline elevation of LA pressure. Repeat echo 9/2016 with normal LV function, EF of 54%; no regional wall motion abnormalities. Follows with cardiology, last seen 9/2016.  - Continue lasix 80 mg BID  - Continue potassium 20 mEq BID  - Trend CMP  - compensated     HTN. On lasix PTA for concurrent CHF, lymphedema. BP intermittently elevated since admission, ranging 120-160's/50-70's.  - Continue lasix per above     CAD, HLD. S/p stenting in mid and proximal LAD (8/2011). Coronary angiogram 9/2016 with one vessel obstructive CAD to RCA; patent stents to LAD and mild mid-stent restenosis 40-50% diameter in proximal LAD stent; non flow-limiting stenosis to LAD and LCx. Cholesterol panel 3/2017 with , , HDL 50, LDL 89. Barnsdall risk score of 9.2%. On aspirin, atorvastatin, and niacin PTA.  - Hold PTA ASA in setting of possible procedure  - Continue home atorvastatin, niacin     Hypothyroidism. Hx of thyroid cancer, s/p partial thyroidectomy in the 1980's; subsequently developed hypothyroidism. TSH 0.12, free T4 very mildly elevated at 1.47 on 3/2017; PCP slightly decreased synthroid dosing from 200 to 175 mcg at that time.  - Continue synthroid 175 mcg QD  - TSH, Free T4 in AM     COPD. PFT's 7/2012 with FVC 77% pred, FEV1 71% pred, FEV1/FVC 92% pred; consistent with mild airflow obstruction, no improvement with bronchodilators. No baseline O2 requirements.  - Continue PTA fluticasone inhaler BID  - Reports allergy to albuterol (facial erythema) - added to chart     Chronic  "Thrombocytopenia. Platelets 120 on admission, BL ~120-130's over past year. Unclear etiology, although appears to be consistent with baseline.  - Trend CBC     Chronic Lymphedema. Self-applies lymphedema wraps weekly. Also on diuretics PTA. 2+ pitting edema on exam.  - Home lymphedema wraps in place  - Diuretics per above  - Declined lymphedema consult at this time, requesting to care for own leg wraps     Chronic Pain 2/2 OA, Fibromyalgia. Has had imaging of lumbar/thoracic spine revealing moderate DJD; left shoulder with severe DJD. Utilizes scheduled morphine PTA, has been working with PCP to slowly wean off.   - Continue PTA morphine 15 mg PO BID  - Morphine 15 mg QD PRN for breakthrough pain  - Tizanidine 4 mg QHS, PRN dose available     Depression, Insomnia. Stable, no acute concerns.  - Continue PTA escitalopram 20 mg QAM, trazodone 150 mg QHS     Diet:  Moderate CHO Diet, NPO @ midnight  Fluids: N/A  DVT Prophylaxis: Pneumatic Compression Devices, no anticoagulation with possible procedure tomorrow  Code Status: Full Code       Subjective:  RUQ pain.     Review of systems is otherwise negative.    Objective:  /53 (BP Location: Left arm)  Pulse 73  Temp 97.3  F (36.3  C) (Oral)  Resp 16  Ht 1.689 m (5' 6.5\")  Wt (!) 140.8 kg (310 lb 8 oz)  SpO2 92%  BMI 49.37 kg/m2  Gen: alert and oriented, in NAD  CV: RRR, S1 and S2 noted  Chest: good air entry no crackles  Abdomen: soft, NT, ND, NABS  Extremities: distal pulses 2+, 1+ pitting edema noted    Labs:    Lab Results   Component Value Date    WBC 4.8 05/18/2017    HGB 12.0 05/18/2017    HCT 37.3 05/18/2017    PLT 95 (L) 05/18/2017     05/18/2017    POTASSIUM 3.2 (L) 05/18/2017    CHLORIDE 105 05/18/2017    CO2 29 05/18/2017    BUN 19 05/18/2017    CR 1.13 (H) 05/18/2017     (H) 05/18/2017    SED 18 08/10/2016    DD 1.1 (H) 05/17/2017    NTBNPI 946 (H) 12/19/2015    NTBNP 174 (H) 07/26/2016    TROPONIN 0.01 12/19/2015    TROPI  05/17/2017 "     <0.015  The 99th percentile for upper reference range is 0.045 ug/L.  Troponin values in   the range of 0.045 - 0.120 ug/L may be associated with risks of adverse   clinical events.      AST 15 05/18/2017    ALT 17 05/18/2017    ALKPHOS 83 05/18/2017    BILITOTAL 0.4 05/18/2017    INR 1.04 05/18/2017        Meds    Current Facility-Administered Medications   Medication     technetium Tc 99m mebrofenin (CHOLETEC) radioisotope injection 4.8-7.2 mCi     naloxone (NARCAN) injection 0.1-0.4 mg     acetaminophen (TYLENOL) tablet 650 mg     ondansetron (ZOFRAN-ODT) ODT tab 4 mg    Or     ondansetron (ZOFRAN) injection 4 mg     polyethylene glycol (MIRALAX/GLYCOLAX) Packet 17 g     senna-docusate (SENOKOT-S;PERICOLACE) 8.6-50 MG per tablet 1-2 tablet     escitalopram (LEXAPRO) tablet 20 mg     fluticasone furoate (ARNUITY ELLIPTA) 100 MCG/ACT inhalation powder 1 puff     morphine (MSIR) IR tablet 15 mg     nitroglycerin (NITROSTAT) sublingual tablet 0.4 mg     pregabalin (LYRICA) capsule 100 mg     tiZANidine (ZANAFLEX) tablet 4 mg     tiZANidine (ZANAFLEX) tablet 4 mg     traZODone (DESYREL) tablet 150 mg     niacin (NIASPAN) CR tablet 500 mg     guaiFENesin (MUCINEX) 12 hr tablet 600 mg     folic acid (FOLVITE) tablet 800 mcg     levothyroxine (SYNTHROID/LEVOTHROID) tablet 175 mcg     medroxyPROGESTERone (PROVERA) tablet 10 mg     morphine (MSIR) IR tablet 15 mg     glucose 40 % gel 15-30 g    Or     dextrose 50 % injection 25-50 mL    Or     glucagon injection 1 mg     insulin aspart (NovoLOG) inj (RAPID ACTING)     insulin aspart (NovoLOG) inj (RAPID ACTING)     lidocaine (LIDODERM) 5 % Patch 2 patch    And     lidocaine (LIDODERM) patch REMOVAL    And     lidocaine (LIDODERM) Patch in Place     hypromellose-dextran (ARTIFICAL TEARS) ophthalmic solution 1 drop     atorvastatin (LIPITOR) tablet 40 mg     cholecalciferol (vitamin D3) capsule CAPS 10,000 Units     furosemide (LASIX) tablet 80 mg     potassium chloride   (K-DUR/KLOR-CON M) CR tablet 20 mEq         Imaging:    Recent Results (from the past 24 hour(s))   US Abdomen Limited   Result Value    Radiologist flags Cholelithiasis and CBD dilation (Urgent)    Narrative    EXAMINATION: Limited Abdominal Ultrasound, 5/17/2017 2:42 PM     COMPARISON: 12/20/2015, 10/6/2010    HISTORY: Right upper quadrant pain.    FINDINGS:   Fluid: No evidence of ascites or pleural effusions.    Liver: The liver demonstrates increased echogenicity with attenuation  and poor visualization of portal interfaces, measuring 21.1 cm in  craniocaudal dimension. There is no focal mass.     Gallbladder: There is no wall thickening, pericholecystic fluid, or  positive sonographic Alvarez's sign. Several hyperechoic shadowing  mobile stones within the gallbladder.    Bile Ducts: No intrahepatic biliary dilation noted.  The common bile  duct measures 11 mm in diameter.    Pancreas: Within the pancreatic neck, there are two anechoic and  avascular cystic foci the largest measuring up to 9 mm.    Kidney: The right kidney measures 9.6 cm long. There is no  hydronephrosis or hydroureter, no shadowing renal calculi, cystic  lesion or mass.       Impression    IMPRESSION:   1.  Cholelithiasis and common bile duct dilation without positive  sonographic Alvarez sign, gallbladder wall thickening, or  pericholecystic fluid. These findings are equivocal, and could be  further evaluated with hepatobiliary scan or MRCP/ERCP.  2.  Two subcentimeter cystic foci in the pancreas which may represent  simple cysts or side branch IPMN.  Consider further evaluation with  pancreas MRI on a non-emergent basis.  3.  Hepatic steatosis with hepatomegaly.    [Urgent Result: Cholelithiasis and CBD dilation]    Finding was identified on 5/17/2017 2:42 PM.     Dr. Travis was contacted by Dr. Dale at 5/17/2017 2:49 PM and  verbalized understanding of the urgent finding.     I have personally reviewed the examination and initial  interpretation  and I agree with the findings.    CAHRISMA Roberts MD,MS  [phone]

## 2017-05-18 NOTE — PROGRESS NOTES
Care Coordinator Progress Note     Admission Date/Time:  5/17/2017  Attending MD:  Dex Ann, *     Data  Chart reviewed, discussed with interdisciplinary team.   Patient was admitted for:    RUQ abdominal pain  Calculus of gallbladder and bile duct with cholecystitis with obstruction, unspecified cholecystitis acuity.    Concerns with insurance coverage for discharge needs: None.  Current Living Situation: Patient lives with adult .  Support System: Supportive  Services Involved: None at admission  Transportation: Family or Friend will provide  Barriers to Discharge: ongoing medical plan of care       Assessment    Patient is a 71 year old female admitted for RUQ pain, acute cholelithiasis, awaiting testing/imaging today.    I spoke with the patient and her roommate/friend Odalis at the bedside. The patient states that she was receiving no services at prior to admission but has had [facility] in the past. The patient has lymph edema follow up as an outpatient and she states that she has several friends, including her roommate, who can provide transportation to appointments.    No discharge needs identified at this time, RN Care Coordinator will continue to follow medical plan of care.      Plan  Anticipated Discharge Date:  TBD, pending medical workup  Anticipated Discharge Plan:  Home with outpatient follow up and support from friends/roomate    Lanette Damon RN
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
## 2017-05-18 NOTE — PLAN OF CARE
"Problem: Goal Outcome Summary  Goal: Goal Outcome Summary  Outcome: Improving  Blood pressure 161/57, pulse 73, temperature 97.7  F (36.5  C), temperature source Oral, resp. rate 18, height 1.689 m (5' 6.5\"), weight (!) 140.8 kg (310 lb 8 oz), SpO2 93 %, not currently breastfeeding.    Assumed care of pt at 0300. Pt sleeping during shift. Pt NPO since midnight for possible procedure this AM. Will continue to monitor for safety.      "

## 2017-05-19 VITALS
BODY MASS INDEX: 45.99 KG/M2 | TEMPERATURE: 97.1 F | OXYGEN SATURATION: 91 % | SYSTOLIC BLOOD PRESSURE: 126 MMHG | WEIGHT: 293 LBS | HEART RATE: 67 BPM | RESPIRATION RATE: 16 BRPM | DIASTOLIC BLOOD PRESSURE: 40 MMHG | HEIGHT: 67 IN

## 2017-05-19 LAB
ALBUMIN SERPL-MCNC: 2.9 G/DL (ref 3.4–5)
ALP SERPL-CCNC: 88 U/L (ref 40–150)
ALT SERPL W P-5'-P-CCNC: 21 U/L (ref 0–50)
ANION GAP SERPL CALCULATED.3IONS-SCNC: 7 MMOL/L (ref 3–14)
AST SERPL W P-5'-P-CCNC: 18 U/L (ref 0–45)
BILIRUB SERPL-MCNC: 0.7 MG/DL (ref 0.2–1.3)
BUN SERPL-MCNC: 17 MG/DL (ref 7–30)
CALCIUM SERPL-MCNC: 9.1 MG/DL (ref 8.5–10.1)
CHLORIDE SERPL-SCNC: 104 MMOL/L (ref 94–109)
CO2 SERPL-SCNC: 31 MMOL/L (ref 20–32)
CREAT SERPL-MCNC: 1.03 MG/DL (ref 0.52–1.04)
ERYTHROCYTE [DISTWIDTH] IN BLOOD BY AUTOMATED COUNT: 13.7 % (ref 10–15)
GFR SERPL CREATININE-BSD FRML MDRD: 53 ML/MIN/1.7M2
GLUCOSE BLDC GLUCOMTR-MCNC: 138 MG/DL (ref 70–99)
GLUCOSE BLDC GLUCOMTR-MCNC: 151 MG/DL (ref 70–99)
GLUCOSE SERPL-MCNC: 152 MG/DL (ref 70–99)
HCT VFR BLD AUTO: 37.6 % (ref 35–47)
HGB BLD-MCNC: 12.2 G/DL (ref 11.7–15.7)
MCH RBC QN AUTO: 27.2 PG (ref 26.5–33)
MCHC RBC AUTO-ENTMCNC: 32.4 G/DL (ref 31.5–36.5)
MCV RBC AUTO: 84 FL (ref 78–100)
PHOSPHATE SERPL-MCNC: 3.6 MG/DL (ref 2.5–4.5)
PLATELET # BLD AUTO: 107 10E9/L (ref 150–450)
POTASSIUM SERPL-SCNC: 3.2 MMOL/L (ref 3.4–5.3)
POTASSIUM SERPL-SCNC: 3.6 MMOL/L (ref 3.4–5.3)
PROT SERPL-MCNC: 6.3 G/DL (ref 6.8–8.8)
RBC # BLD AUTO: 4.49 10E12/L (ref 3.8–5.2)
SODIUM SERPL-SCNC: 143 MMOL/L (ref 133–144)
WBC # BLD AUTO: 5.3 10E9/L (ref 4–11)

## 2017-05-19 PROCEDURE — 25000132 ZZH RX MED GY IP 250 OP 250 PS 637: Mod: GY | Performed by: PHYSICIAN ASSISTANT

## 2017-05-19 PROCEDURE — 84100 ASSAY OF PHOSPHORUS: CPT | Performed by: INTERNAL MEDICINE

## 2017-05-19 PROCEDURE — 99239 HOSP IP/OBS DSCHRG MGMT >30: CPT | Performed by: INTERNAL MEDICINE

## 2017-05-19 PROCEDURE — 85027 COMPLETE CBC AUTOMATED: CPT | Performed by: INTERNAL MEDICINE

## 2017-05-19 PROCEDURE — 84132 ASSAY OF SERUM POTASSIUM: CPT | Performed by: INTERNAL MEDICINE

## 2017-05-19 PROCEDURE — A9270 NON-COVERED ITEM OR SERVICE: HCPCS | Mod: GY | Performed by: PHYSICIAN ASSISTANT

## 2017-05-19 PROCEDURE — 25000132 ZZH RX MED GY IP 250 OP 250 PS 637: Mod: GY | Performed by: INTERNAL MEDICINE

## 2017-05-19 PROCEDURE — 36415 COLL VENOUS BLD VENIPUNCTURE: CPT | Performed by: INTERNAL MEDICINE

## 2017-05-19 PROCEDURE — 00000146 ZZHCL STATISTIC GLUCOSE BY METER IP

## 2017-05-19 PROCEDURE — 80053 COMPREHEN METABOLIC PANEL: CPT | Performed by: INTERNAL MEDICINE

## 2017-05-19 PROCEDURE — A9270 NON-COVERED ITEM OR SERVICE: HCPCS | Mod: GY | Performed by: INTERNAL MEDICINE

## 2017-05-19 RX ORDER — POTASSIUM CL/LIDO/0.9 % NACL 10MEQ/0.1L
10 INTRAVENOUS SOLUTION, PIGGYBACK (ML) INTRAVENOUS
Status: DISCONTINUED | OUTPATIENT
Start: 2017-05-19 | End: 2017-05-19 | Stop reason: HOSPADM

## 2017-05-19 RX ORDER — POTASSIUM CHLORIDE 750 MG/1
20-40 TABLET, EXTENDED RELEASE ORAL
Status: DISCONTINUED | OUTPATIENT
Start: 2017-05-19 | End: 2017-05-19 | Stop reason: HOSPADM

## 2017-05-19 RX ORDER — POTASSIUM CHLORIDE 1.5 G/1.58G
20-40 POWDER, FOR SOLUTION ORAL
Status: DISCONTINUED | OUTPATIENT
Start: 2017-05-19 | End: 2017-05-19 | Stop reason: HOSPADM

## 2017-05-19 RX ORDER — POTASSIUM CHLORIDE 7.45 MG/ML
10 INJECTION INTRAVENOUS
Status: DISCONTINUED | OUTPATIENT
Start: 2017-05-19 | End: 2017-05-19 | Stop reason: HOSPADM

## 2017-05-19 RX ORDER — POTASSIUM CHLORIDE 29.8 MG/ML
20 INJECTION INTRAVENOUS
Status: DISCONTINUED | OUTPATIENT
Start: 2017-05-19 | End: 2017-05-19 | Stop reason: HOSPADM

## 2017-05-19 RX ADMIN — POTASSIUM CHLORIDE 20 MEQ: 750 TABLET, EXTENDED RELEASE ORAL at 11:25

## 2017-05-19 RX ADMIN — ESCITALOPRAM OXALATE 20 MG: 20 TABLET ORAL at 10:25

## 2017-05-19 RX ADMIN — CHOLECALCIFEROL CAP 125 MCG (5000 UNIT) 10000 UNITS: 125 CAP at 09:15

## 2017-05-19 RX ADMIN — POTASSIUM CHLORIDE 20 MEQ: 750 TABLET, EXTENDED RELEASE ORAL at 09:13

## 2017-05-19 RX ADMIN — POTASSIUM CHLORIDE 20 MEQ: 750 TABLET, EXTENDED RELEASE ORAL at 09:14

## 2017-05-19 RX ADMIN — MORPHINE SULFATE 15 MG: 15 TABLET ORAL at 09:15

## 2017-05-19 RX ADMIN — ACETAMINOPHEN 800 MCG: 400 TABLET ORAL at 09:15

## 2017-05-19 RX ADMIN — DEXTRAN 70, AND HYPROMELLOSE 2910 1 DROP: 1; 3 SOLUTION/ DROPS OPHTHALMIC at 11:25

## 2017-05-19 RX ADMIN — PREGABALIN 100 MG: 100 CAPSULE ORAL at 09:15

## 2017-05-19 RX ADMIN — FUROSEMIDE 80 MG: 40 TABLET ORAL at 09:14

## 2017-05-19 RX ADMIN — LEVOTHYROXINE SODIUM 175 MCG: 175 TABLET ORAL at 09:14

## 2017-05-19 RX ADMIN — NIACIN 500 MG: 500 TABLET, FILM COATED, EXTENDED RELEASE ORAL at 10:25

## 2017-05-19 RX ADMIN — GUAIFENESIN 600 MG: 600 TABLET, EXTENDED RELEASE ORAL at 09:15

## 2017-05-19 NOTE — DISCHARGE SUMMARY
Physician Discharge Summary     Patient ID:  Mariel Ribeiro  [id number]  71 year old  1945    Admit date: 5/17/2017    Discharge date and time: 5/19/2017     Admitting Physician: Dex Ann MD     Discharge Physician: Enzo Roberts     Admission Diagnoses: RUQ abdominal pain [R10.11]  Calculus of gallbladder and bile duct with cholecystitis with obstruction, unspecified cholecystitis acuity [K80.61]    Discharge Diagnoses: Self limited RUQ pain ? Biliary colic ?    Admission Condition: good    Discharged Condition: good    Indication for Admission: Workup for pain    Assessment/Plan:  Mariel Ribeiro is a 71 year old female admitted on 5/17/2017. She has a history of COPD, CKD stage III, HTN, diastolic CHF, CAD s/p stent x 2 (2011), HLD, DM Type II, BELEN on CPAP, thyroid cancer s/p partial thyroidectomy (1980's) with subsequent hypothyroidism, chronic pain 2/2 OA, fibromyalgia, chronic lymphedema, migraines, depression, and insomnia and is admitted to internal medicine for acute cholelithiasis.      Self limited RUQ pain:  Persistent RUQ abdominal pain over past 4 days. Evaluated in clinic 5/17 by PCP who recommended patient present to the ED for further evaluation. Abd US in ED with cholelithiasis and CBD dilation; no gallbladder wall thickening or pericholecystic fluid. Lipase normal; liver enzymes WNL, no leukocytosis  - HIDA scan done, no evidence of acute cholecystitis  - Chronic cholecystitis to be followed up by general surgery clinic as an outpatient  - RUQ pain resolved this am.  - Encouraged laxative, hydration and stool softeners to avoid constipation.  - discussed cbd dilation with Dr Jacob and surgery. Acute ductal obstruction is unlikely and further imaging for this can be followed up as an outpatient in clinic.      Pancreatic Cysts. Incidentally noted on abdominal US in ED, described as 2 subcentimeter cystic foci possibly representing simple cysts or side branch IPMN.   - spoke to  Dr Almodovar. reimaging to be done as outpatients, GI will follow in clinic      DM Type II c/b Peripheral Neuropathy. HgbA1C 7.2% 3/2017. Not on insulin PTA. Utilizes lyrica for neuropathy.  on admission.   DC metformin on DC given CKD    CKD Stage III. Diagnosed in 2009. Cr 1.12, GFR 48 in ED; BL Cr ~1.1-1.2 and GFR mid-40's. UA without proteinuria.      Diastolic HFpEF. Echo 7/2012 with moderate diastolic dysfunction with borderline elevation of LA pressure. Repeat echo 9/2016 with normal LV function, EF of 54%; no regional wall motion abnormalities. Follows with cardiology, last seen 9/2016.  - Continue lasix 80 mg BID  - Continue potassium 20 mEq BID  - compensated      HTN. On lasix PTA for concurrent CHF, lymphedema. BP intermittently elevated since admission, ranging 120-160's/50-70's.  - Continue lasix per above      CAD, HLD. S/p stenting in mid and proximal LAD (8/2011). Coronary angiogram 9/2016 with one vessel obstructive CAD to RCA; patent stents to LAD and mild mid-stent restenosis 40-50% diameter in proximal LAD stent; non flow-limiting stenosis to LAD and LCx. Cholesterol panel 3/2017 with , , HDL 50, LDL 89. Harrison risk score of 9.2%. On aspirin, atorvastatin, and niacin PTA.  - Hold PTA ASA in setting of possible procedure  - Continue home atorvastatin, niacin      Hypothyroidism. Hx of thyroid cancer, s/p partial thyroidectomy in the 1980's; subsequently developed hypothyroidism. TSH 0.12, free T4 very mildly elevated at 1.47 on 3/2017; PCP slightly decreased synthroid dosing from 200 to 175 mcg at that time.  - Continue synthroid 175 mcg QD  - TSH, Free T4 in AM      COPD. PFT's 7/2012 with FVC 77% pred, FEV1 71% pred, FEV1/FVC 92% pred; consistent with mild airflow obstruction, no improvement with bronchodilators. No baseline O2 requirements.  - Continue PTA fluticasone inhaler BID  - Reports allergy to albuterol (facial erythema) - added to chart      Chronic  Thrombocytopenia. Platelets 120 on admission, BL ~120-130's over past year. Unclear etiology, although appears to be consistent with baseline.  - Trend CBC      Chronic Lymphedema. Self-applies lymphedema wraps weekly. Also on diuretics PTA. 2+ pitting edema on exam.  - Home lymphedema wraps in place  - Diuretics per above  - Declined lymphedema consult at this time, requesting to care for own leg wraps      Chronic Pain 2/2 OA, Fibromyalgia. Has had imaging of lumbar/thoracic spine revealing moderate DJD; left shoulder with severe DJD. Utilizes scheduled morphine PTA, has been working with PCP to slowly wean off.   - Continue PTA morphine 15 mg PO BID  - Morphine 15 mg QD PRN for breakthrough pain  - Tizanidine 4 mg QHS, PRN dose available      Depression, Insomnia. Stable, no acute concerns.  - Continue PTA escitalopram 20 mg QAM, trazodone 150 mg QHS        Consults: gi, surgery    Significant Diagnostic Studies:   Results for orders placed or performed during the hospital encounter of 05/17/17   US Abdomen Limited     Value    Radiologist flags Cholelithiasis and CBD dilation (Urgent)    Narrative    EXAMINATION: Limited Abdominal Ultrasound, 5/17/2017 2:42 PM     COMPARISON: 12/20/2015, 10/6/2010    HISTORY: Right upper quadrant pain.    FINDINGS:   Fluid: No evidence of ascites or pleural effusions.    Liver: The liver demonstrates increased echogenicity with attenuation  and poor visualization of portal interfaces, measuring 21.1 cm in  craniocaudal dimension. There is no focal mass.     Gallbladder: There is no wall thickening, pericholecystic fluid, or  positive sonographic Alvarez's sign. Several hyperechoic shadowing  mobile stones within the gallbladder.    Bile Ducts: No intrahepatic biliary dilation noted.  The common bile  duct measures 11 mm in diameter.    Pancreas: Within the pancreatic neck, there are two anechoic and  avascular cystic foci the largest measuring up to 9 mm.    Kidney: The right  kidney measures 9.6 cm long. There is no  hydronephrosis or hydroureter, no shadowing renal calculi, cystic  lesion or mass.       Impression    IMPRESSION:   1.  Cholelithiasis and common bile duct dilation without positive  sonographic Alvarez sign, gallbladder wall thickening, or  pericholecystic fluid. These findings are equivocal, and could be  further evaluated with hepatobiliary scan or MRCP/ERCP.  2.  Two subcentimeter cystic foci in the pancreas which may represent  simple cysts or side branch IPMN.  Consider further evaluation with  pancreas MRI on a non-emergent basis.  3.  Hepatic steatosis with hepatomegaly.    [Urgent Result: Cholelithiasis and CBD dilation]    Finding was identified on 5/17/2017 2:42 PM.     Dr. Travis was contacted by Dr. Dale at 5/17/2017 2:49 PM and  verbalized understanding of the urgent finding.     I have personally reviewed the examination and initial interpretation  and I agree with the findings.    CHARISMA COOPER   NM HepatOBiliary Scan    Narrative    Examination:  NM HEPATOBILIARY SCAN      Date: 5/18/2017 5:07 PM.    Indication:   Cholelithiasis on US     Additional Information: none    Technique:    The patient received 5.5 mCi of Tc-99m Choletec intravenously. Images  were obtained out through 60 minutes.   At 90 minutes the patient  received 2.8 mcg of CCK over [Infusion Time] minutes. An additional 45  minutes of images were obtained after the gall bladder contraction  intervention.    Findings:    There is prompt clearance of the radionuclide from the blood pool into  the liver. By 10 minutes there is clear visualization of the  intrahepatic ducts as well as the upper common bile duct. By 15  minutes there is visualization of the gallbladder. At 60 minutes there  is emptying from the common bile duct into the small bowel.    After CCK administration the Gallbladder ejection fraction was  measured at 20%.  The normal gallbladder EF based on a 45 minute  infusion is  >40%.    Enterogastric reflux was not present.      Impression    Impression:    Reduced gallbladder ejection fraction measuring 20% compatible with  chronic cholecystitis. No evidence for acute cystic duct  obstruction/acute cholecystitis.    =======================    The normal Gall Bladder ejection fraction for a 45 minute infusion is  >40%    I have personally reviewed the examination and initial interpretation  and I agree with the findings.    CHARISMA JAVED MD     *Note: Due to a large number of results and/or encounters for the requested time period, some results have not been displayed. A complete set of results can be found in Results Review.         Treatments: IV hydration    Discharge Exam:  Gen: alert X OX3  Abd: soft NT, no evidence of RUQ tenderness. Abdomen benign.     Disposition: home    Patient Instructions:   Current Discharge Medication List      CONTINUE these medications which have NOT CHANGED    Details   furosemide (LASIX) 40 MG tablet Take 2 tablets (80 mg) by mouth 2 times daily  Qty: 120 tablet, Refills: 6    Associated Diagnoses: Chronic diastolic heart failure (H)      tiZANidine (ZANAFLEX) 4 MG tablet TAKE 1 TABLET BY MOUTH DAILY AS NEEDED.  Qty: 90 tablet, Refills: 3    Associated Diagnoses: Muscle spasm      atorvastatin (LIPITOR) 40 MG tablet TAKE 1 TABLET(40 MG) BY MOUTH DAILY  Qty: 90 tablet, Refills: 3    Associated Diagnoses: Hyperlipidemia with target LDL less than 70      morphine (MSIR) 15 MG IR tablet Take 1 tablet (15 mg) by mouth 2 times daily  Qty: 60 tablet, Refills: 0    Associated Diagnoses: Asthma exacerbation      sitagliptin (JANUVIA) 50 MG tablet Take 1 tablet (50 mg) by mouth daily  Qty: 90 tablet, Refills: 3    Associated Diagnoses: Type 2 diabetes mellitus with chronic kidney disease, without long-term current use of insulin, unspecified CKD stage (H)      pregabalin (LYRICA) 100 MG capsule Take 1 capsule (100 mg) by mouth 2 times daily  Qty: 180 capsule,  Refills: 2    Associated Diagnoses: Myalgia      levothyroxine (SYNTHROID/LEVOTHROID) 175 MCG tablet Take 1 tablet (175 mcg) by mouth daily Repeat labs in 4-6 weeks  Qty: 90 tablet, Refills: 0    Associated Diagnoses: Hypothyroidism, unspecified type      albuterol (ALBUTEROL) 108 (90 BASE) MCG/ACT Inhaler INHALE 1 TO 2 PUFFS EVERY 4 TO 6 HOURS AS NEEDED  Qty: 1 Inhaler, Refills: PRN    Associated Diagnoses: Chronic obstructive pulmonary disease with acute exacerbation (H)      febuxostat (ULORIC) 40 MG TABS tablet Take 1 tablet (40 mg) by mouth every evening  Qty: 90 tablet, Refills: 3    Associated Diagnoses: Hyperuricemia      traZODone (DESYREL) 50 MG tablet Take 3 tablets (150 mg) by mouth At Bedtime  Qty: 90 tablet, Refills: 7    Associated Diagnoses: Insomnia, unspecified      glimepiride (AMARYL) 4 MG tablet Take 1 tablet (4 mg) by mouth 2 times daily (with meals)  Qty: 180 tablet, Refills: 1    Comments: LAST REFILL NEEDS LABS AND VISIT  Associated Diagnoses: Type 2 diabetes mellitus with chronic kidney disease, without long-term current use of insulin, unspecified CKD stage (H)      fluticasone (FLOVENT DISKUS) 100 MCG/BLIST AEPB Inhale 1 puff into the lungs every 12 hours  Qty: 60 Inhaler, Refills: 3    Associated Diagnoses: Chronic obstructive pulmonary disease, unspecified COPD type (H)      medroxyPROGESTERone (PROVERA) 10 MG tablet Take 1 tablet (10 mg) by mouth daily  Qty: 90 tablet, Refills: 3    Associated Diagnoses: Endometrial hyperplasia, simple      potassium chloride SA (K-DUR,KLOR-CON M) 10 MEQ tablet Take 2 tablets (20 mEq) by mouth 2 times daily  Qty: 120 tablet, Refills: 11    Associated Diagnoses: Hypokalemia; Chronic diastolic heart failure (H)      escitalopram (LEXAPRO) 20 MG tablet Take 1 tablet (20 mg) by mouth every morning  Qty: 90 tablet, Refills: 3    Associated Diagnoses: Major depressive disorder, single episode, in partial remission (H)      niacin (NIASPAN) 500 MG CR tablet  Take 1 tablet (500 mg) by mouth 2 times daily  Qty: 180 tablet, Refills: 3    Associated Diagnoses: Hyperlipidemia with target LDL less than 70      polyethylene glycol (MIRALAX) powder Take 17 grams by mouth once to twice daily.  Qty: 510 g, Refills: 1    Associated Diagnoses: Drug-induced constipation      LIFESCAN FINEPOINT LANCETS MISC Use to test blood sugars 2 times daily or as directed.  Qty: 100 each, Refills: prn    Associated Diagnoses: Type 2 diabetes, HbA1C goal < 8% (H)      nitroglycerin (NITROSTAT) 0.4 MG SL tablet Place 1 tablet (0.4 mg) under the tongue every 5 minutes as needed for chest pain As needed for chest pain.  Qty: 25 tablet, Refills: PRN    Associated Diagnoses: Coronary atherosclerosis of unspecified type of vessel, native or graft      aspirin  MG tablet Take 1 tablet by mouth daily.  Qty: 30 tablet, Refills: 0      Folic Acid 800 MCG TABS Take 800 mcg by mouth daily.      Cholecalciferol (VITAMIN D3) 5000 UNIT/ML LIQD Take 10,000 Units by mouth daily       methylPREDNISolone (MEDROL DOSEPAK) 4 MG tablet Follow package instructions  Qty: 21 tablet, Refills: 0    Associated Diagnoses: Asthma exacerbation      doxycycline (VIBRAMYCIN) 100 MG capsule Take 1 capsule (100 mg) by mouth 2 times daily  Qty: 20 capsule, Refills: 0    Associated Diagnoses: Chronic obstructive pulmonary disease with acute exacerbation (H)      EPINEPHrine 0.3 MG/0.3ML injection Inject 0.3 mLs (0.3 mg) into the muscle once as needed for anaphylaxis  Qty: 0.6 mL, Refills: 0    Associated Diagnoses: Allergic reaction, subsequent encounter      Miconazole Nitrate 2 % POWD Apply to rash on chest three times daily.  Continue for one week after the rash has resolved.  Qty: 100 g, Refills: 3    Associated Diagnoses: Intertrigo      blood glucose monitoring (NO BRAND SPECIFIED) test strip 1 strip by In Vitro route 2 times daily Strips per patient's preference  Qty: 200 each, Refills: 0    Associated Diagnoses: Type 2  diabetes mellitus with diabetic chronic kidney disease (H)      senna-docusate (SENOKOT-S;PERICOLACE) 8.6-50 MG per tablet Take 1-2 tablets by mouth 2 times daily as needed for constipation  Qty: 60 tablet, Refills: 0    Associated Diagnoses: Constipation      ONE TOUCH ULTRA (DEVICES) MISC test blood sugar BID  Qty: 1, Refills: 0    Associated Diagnoses: Type II or unspecified type diabetes mellitus without mention of complication, not stated as uncontrolled      GUAIFENESIN  MG OR TBCR Take 600 mg by mouth twice daily  Qty: 30, Refills: 0         STOP taking these medications       tiZANidine (ZANAFLEX) 4 MG capsule Comments:   Reason for Stopping:         metFORMIN (GLUCOPHAGE-XR) 500 MG 24 hr tablet Comments:   Reason for Stopping:             Activity: activity as tolerated  Diet: encourage fluids  Wound Care: none needed    Follow-up with PCP in 1-2 weeks  Folowup with Dr Almodovar ([facility])  Followup with Dr Roberson(([facility])      Signed:  Enzo Roberts  5/19/2017  1:30 PM

## 2017-05-19 NOTE — PROGRESS NOTES
Patient discharged to home with roommate at 1420 by wheelchair down to front door. PIV removed and discharge paperwork reviewed with patient and roommate. Both verbalized understanding and had no further questions. All belongings sent with patient. Patient to follow-up with follow-up appts.

## 2017-05-19 NOTE — PLAN OF CARE
Problem: Goal Outcome Summary  Goal: Goal Outcome Summary  Patient up independent. Patient elevates her feet when in bed. Patient anxious for her test results form the clarence scan today. Patient took her morning pills what she wanted. Patient vss. P;cont to monitor

## 2017-05-19 NOTE — PLAN OF CARE
"Problem: Goal Outcome Summary  Goal: Goal Outcome Summary  Outcome: Improving  Blood pressure 159/52, pulse 71, temperature 97.5  F (36.4  C), temperature source Oral, resp. rate 16, height 1.689 m (5' 6.5\"), weight (!) 139 kg (306 lb 8 oz), SpO2 91 %, not currently breastfeeding.    Pt sleeping most of shift. Pt up void x1 SBA with cane. BLE edema +3. A&O x4. Will continue to monitor.      "

## 2017-05-19 NOTE — PHARMACY-ADMISSION MEDICATION HISTORY
Medication reconciliation complete.  Please see pharmacy intern's note from 5/18/2017.    Beatriz Arreaga, pharmD  PGY-1 Pharmacy Resident

## 2017-05-19 NOTE — PROGRESS NOTES
"Surgery note  5/19/2017    Really wants to go home, however, says she has some pain in her upper abdomen and back.   Notes a history of having difficulty with bowel movements.     /40 (BP Location: Left arm)  Pulse 67  Temp 97.1  F (36.2  C) (Oral)  Resp 16  Ht 1.689 m (5' 6.5\")  Wt (!) 139 kg (306 lb 8 oz)  SpO2 91%  BMI 48.73 kg/m2  nad A&Ox3  Abdomen soft, nondistended. Nontender in the RUQ, negative Alvarez's sign, reproducibly tender in the LLQ, no rebound.     Labs reviewed  T bili 0.7   lfts nl    Wbc 5.3    HIDA scan reviewed:   Filling of the GB but depressed EF of 20% which is consistent with biliary dyskinesia, formal read as chronic cholecystitis.   No CBD obstruction seen on HiDA      Impression:   71yF w obesity, deconditioning, diastolic CHF, diabetes and CKD with resolved right upper abdominal tenderness, but ongoing upper abdominal and RUQ abdominal pain. Clinically does not have acute cholecystitis and may have an element of biliary dyskinesia contributing to her symptoms. However, given her many cormorbidities and current resolution of symptoms, would not recommend acute surgical intervention.     She does have left lower quadrant tenderness new on today's exam, but no constitutional symptoms or signs to suggest intraperitoneal inflammatory process. Prior CT has shown diverticulosis but no evidence of diverticulitis right now.     Also with CBD dilation without LFT abnormality, and with pancreatic cysts concerning for branch duct IPMN possibly. Will get MRCP today to delineate CBD dilation.     Recs:   -prn follow up with General Surgery clinic for evaluation for possible cholecystectomy if she has ongoing abdominal pain     Plan shared with Dr. Roberts,     D/w Rosario Sigala PGY 5      "

## 2017-05-20 ASSESSMENT — PATIENT HEALTH QUESTIONNAIRE - PHQ9: SUM OF ALL RESPONSES TO PHQ QUESTIONS 1-9: 11

## 2017-05-22 ENCOUNTER — CARE COORDINATION (OUTPATIENT)
Dept: CARE COORDINATION | Facility: CLINIC | Age: 72
End: 2017-05-22

## 2017-05-22 ENCOUNTER — TELEPHONE (OUTPATIENT)
Dept: FAMILY MEDICINE | Facility: CLINIC | Age: 72
End: 2017-05-22

## 2017-05-22 NOTE — TELEPHONE ENCOUNTER
ED / Discharge Outreach Protocol    Patient Contact    Attempt # 1    Was call answered?  No.  Left message on voicemail with information to call me back.    Hetal Mane RN

## 2017-05-22 NOTE — PROGRESS NOTES
Patient was contacted by an RN for post DC follow up so no duplicate post DC follow up call will be made, message was left with return call back information          Hetal Mane, RN        ED / Discharge Outreach Protocol     Patient Contact     Attempt # 1     Was call answered? No. Left message on voicemail with information to call me back.     Hetal Mane RN

## 2017-05-23 ENCOUNTER — ALLIED HEALTH/NURSE VISIT (OUTPATIENT)
Dept: PHARMACY | Facility: CLINIC | Age: 72
End: 2017-05-23
Payer: COMMERCIAL

## 2017-05-23 VITALS — HEART RATE: 84 BPM | SYSTOLIC BLOOD PRESSURE: 136 MMHG | OXYGEN SATURATION: 93 % | DIASTOLIC BLOOD PRESSURE: 80 MMHG

## 2017-05-23 DIAGNOSIS — K59.00 CONSTIPATION, UNSPECIFIED CONSTIPATION TYPE: ICD-10-CM

## 2017-05-23 DIAGNOSIS — E11.22 TYPE 2 DIABETES MELLITUS WITH CHRONIC KIDNEY DISEASE, WITHOUT LONG-TERM CURRENT USE OF INSULIN, UNSPECIFIED CKD STAGE (H): Primary | ICD-10-CM

## 2017-05-23 PROCEDURE — 99607 MTMS BY PHARM ADDL 15 MIN: CPT | Performed by: PHARMACIST

## 2017-05-23 PROCEDURE — 99605 MTMS BY PHARM NP 15 MIN: CPT | Performed by: PHARMACIST

## 2017-05-23 RX ORDER — METFORMIN HCL 500 MG
500 TABLET, EXTENDED RELEASE 24 HR ORAL 2 TIMES DAILY WITH MEALS
COMMUNITY
End: 2018-05-22

## 2017-05-23 NOTE — TELEPHONE ENCOUNTER
ED / Discharge Outreach Protocol    Patient Contact    Attempt # 2    Was call answered?  No.  Left message on voicemail with information to call me back.    Hetal Mane RN

## 2017-05-23 NOTE — Clinical Note
Rafaela--nagelina--saw iván today --restarted her metformin post hospital stay , added daily miralax --she will f/up with you next week concerning her gallbladder.   Karen Hilton, [facility]. Medication Therapy Management Provider [phone] Dex Smith, Pharm-D-4.

## 2017-05-23 NOTE — MR AVS SNAPSHOT
After Visit Summary   5/23/2017    Mariel Ribeiro    MRN: [id number]           Patient Information     Date Of Birth          1945        Visit Information        Provider Department      5/23/2017 10:00 AM Karen Hilton RPH [facility] MTM        Care Instructions    Recommendations from today's MTM visit:                                                    MTM (medication therapy management) is a service provided by a clinical pharmacist designed to help you get the most of out of your medicines.   Today we reviewed what your medicines are for, how to know if they are working, that your medicines are safe and how to make your medicine regimen as easy as possible.     1. Constipation - you can keep using Senna-S every day, and you should start taking 1 capful of Miralax powder mixed with a glass of juice/water every day - want to have a bowel movement at least every 24-48 hours. If no bowel movement in 2 days, you can call me. If bowel movements become more regular, or if you start having more than one bowel movement per day, you can consider taking less miralax (such as 1/2 cap per day).    2. Diabetes - You can restart the Metformin 500 mg ER tablets - 1 tablet with/after breakfast, and 1 tablet with/after dinner. Try to remember to take the after-dinner Metformin.      3. Tizanidine is okay to keep taking as you've been doing before.       Next MTM visit: Wednesday, November 15th at 11:30 AM.    To schedule another MTM appointment, please call the clinic directly or you may call the MTM scheduling line at [phone] or toll-free at [phone].     My Clinical Pharmacist's contact information:                                                      It was a pleasure seeing you today!  Please feel free to contact me with any questions or concerns you have.      Karen Hilton [facility].  Medication Therapy Management Provider  [phone]  Dex Smith, Pharm-D-4.    You  may receive a survey about the [facility] services you received.  I would appreciate your feedback to help me serve you better in the future. Please fill it out and return it when you can. Your comments will be anonymous.            Follow-ups after your visit        Follow-up notes from your care team     Return in about 6 months (around 11/23/2017).      Your next 10 appointments already scheduled     May 25, 2017 11:00 AM CDT   [facility]    [facility] [address]   [phone]           Bring a current list of meds and any records pertaining to this visit.  For Physicals, please bring immunization records and any forms needing to be filled out.  Please arrive 10 minutes early to complete paperwork.            Jun 01, 2017  4:30 PM CDT   (Arrive by 4:15 PM)   New Patient Visit with Jeffrey Roberson MD   [facility] ([facility])    [address]  4th [address]  [facility] [address] [phone]            Jun 02, 2017 11:30 AM CDT   Treatment 45 with Liudmila Harper PT   [facility] [place], Physical Therapy - Outpatient ([facility])    [address]  [place] [address]   [phone]            Nov 15, 2017 11:30 AM CST   SHORT with Karen Hilton RPH   [facility] ([address]  [address]  [place] [id number] [phone]              Who to contact     If you have questions or need follow up information about today's clinic visit or your schedule please contact [facility] directly at [phone].  Normal or non-critical lab and imaging results will be communicated to you by MyChart, letter or phone within 4 business days after the clinic has received the results. If you do not hear from us within 7 days,  please contact the clinic through [employer] or phone. If you have a critical or abnormal lab result, we will notify you by phone as soon as possible.  Submit refill requests through [employer] or call your pharmacy and they will forward the refill request to us. Please allow 3 business days for your refill to be completed.          Additional Information About Your Visit        [employer] Information     [employer] gives you secure access to your electronic health record. If you see a primary care provider, you can also send messages to your care team and make appointments. If you have questions, please call your primary care clinic.  If you do not have a primary care provider, please call [phone] and they will assist you.        Care EveryWhere ID     This is your Care EveryWhere ID. This could be used by other organizations to access your [place] medical records  [id number]        Your Vitals Were     Pulse Pulse Oximetry                84 93%           Blood Pressure from Last 3 Encounters:   05/23/17 136/80   05/19/17 126/40   05/17/17 136/77    Weight from Last 3 Encounters:   05/18/17 (!) 306 lb 8 oz (139 kg)   05/17/17 (!) 313 lb (142 kg)   04/07/17 (!) 313 lb (142 kg)              Today, you had the following     No orders found for display         Today's Medication Changes          These changes are accurate as of: 5/23/17 11:01 AM.  If you have any questions, ask your nurse or doctor.               These medicines have changed or have updated prescriptions.        Dose/Directions    morphine 15 MG IR tablet   Commonly known as:  MSIR   This may have changed:  when to take this   Used for:  Asthma exacerbation        Dose:  15 mg   Start taking on:  6/6/2017   Take 1 tablet (15 mg) by mouth 2 times daily   Quantity:  60 tablet   Refills:  0       polyethylene glycol powder   Commonly known as:  MIRALAX   This may have changed:    - how much to take  - how to take this  - when to take this  - reasons to take  this  - additional instructions   Used for:  Drug-induced constipation        Take 17 grams by mouth once to twice daily.   Quantity:  510 g   Refills:  1                Primary Care Provider Office Phone # Fax #    Rafaela William [phone]       [facility] [address] [place] [address]        Thank you!     Thank you for choosing [facility]  for your care. Our goal is always to provide you with excellent care. Hearing back from our patients is one way we can continue to improve our services. Please take a few minutes to complete the written survey that you may receive in the mail after your visit with us. Thank you!             Your Updated Medication List - Protect others around you: Learn how to safely use, store and throw away your medicines at www.disposemymeds.org.          This list is accurate as of: 5/23/17 11:01 AM.  Always use your most recent med list.                   Brand Name Dispense Instructions for use    albuterol 108 (90 BASE) MCG/ACT Inhaler    albuterol    1 Inhaler    INHALE 1 TO 2 PUFFS EVERY 4 TO 6 HOURS AS NEEDED       aspirin  MG EC tablet     30 tablet    Take 1 tablet by mouth daily.       atorvastatin 40 MG tablet    LIPITOR    90 tablet    TAKE 1 TABLET(40 MG) BY MOUTH DAILY       blood glucose monitoring test strip    no brand specified    200 each    1 strip by In Vitro route 2 times daily Strips per patient's preference       doxycycline 100 MG capsule    VIBRAMYCIN    20 capsule    Take 1 capsule (100 mg) by mouth 2 times daily       EPINEPHrine 0.3 MG/0.3ML injection     0.6 mL    Inject 0.3 mLs (0.3 mg) into the muscle once as needed for anaphylaxis       escitalopram 20 MG tablet    LEXAPRO    90 tablet    Take 1 tablet (20 mg) by mouth every morning       febuxostat 40 MG Tabs tablet    ULORIC    90 tablet    Take 1 tablet (40 mg) by mouth every evening       fluticasone 100 MCG/BLIST Aepb    FLOVENT  DISKUS    60 Inhaler    Inhale 1 puff into the lungs every 12 hours       folic acid 800 MCG Tabs      Take 800 mcg by mouth daily.       furosemide 40 MG tablet    LASIX    120 tablet    Take 2 tablets (80 mg) by mouth 2 times daily       glimepiride 4 MG tablet    AMARYL    180 tablet    Take 1 tablet (4 mg) by mouth 2 times daily (with meals)       GUAIFENESIN  MG Tbcr     30    Take 600 mg by mouth twice daily       levothyroxine 175 MCG tablet    SYNTHROID/LEVOTHROID    90 tablet    Take 1 tablet (175 mcg) by mouth daily Repeat labs in 4-6 weeks       Sensopia LANCETS Misc     100 each    Use to test blood sugars 2 times daily or as directed.       medroxyPROGESTERone 10 MG tablet    PROVERA    90 tablet    Take 1 tablet (10 mg) by mouth daily       metFORMIN 500 MG 24 hr tablet    GLUCOPHAGE-XR     Take 500 mg by mouth 2 times daily (with meals)       methylPREDNISolone 4 MG tablet    MEDROL DOSEPAK    21 tablet    Follow package instructions       Miconazole Nitrate 2 % Powd     100 g    Apply to rash on chest three times daily.  Continue for one week after the rash has resolved.       morphine 15 MG IR tablet   Start taking on:  6/6/2017    MSIR    60 tablet    Take 1 tablet (15 mg) by mouth 2 times daily       niacin 500 MG CR tablet    NIASPAN    180 tablet    Take 1 tablet (500 mg) by mouth 2 times daily       nitroglycerin 0.4 MG sublingual tablet    NITROSTAT    25 tablet    Place 1 tablet (0.4 mg) under the tongue every 5 minutes as needed for chest pain As needed for chest pain.       ONE TOUCH ULTRA (DEVICES) Misc     1    test blood sugar BID       polyethylene glycol powder    MIRALAX    510 g    Take 17 grams by mouth once to twice daily.       potassium chloride SA 10 MEQ CR tablet    K-DUR/KLOR-CON M    120 tablet    Take 2 tablets (20 mEq) by mouth 2 times daily       pregabalin 100 MG capsule    LYRICA    180 capsule    Take 1 capsule (100 mg) by mouth 2 times daily        senna-docusate 8.6-50 MG per tablet    SENOKOT-S;PERICOLACE    60 tablet    Take 1-2 tablets by mouth 2 times daily as needed for constipation       sitagliptin 50 MG tablet    JANUVIA    90 tablet    Take 1 tablet (50 mg) by mouth daily       tiZANidine 4 MG tablet    ZANAFLEX    90 tablet    TAKE 1 TABLET BY MOUTH DAILY AS NEEDED.       traZODone 50 MG tablet    DESYREL    90 tablet    Take 3 tablets (150 mg) by mouth At Bedtime       vitamin D3 5000 UNIT/ML Liqd      Take 10,000 Units by mouth daily

## 2017-05-23 NOTE — PROGRESS NOTES
SUBJECTIVE/OBJECTIVE:                Mariel Ribeiro is a 71 year old female coming in for a transitions of care visit.  She was discharged from [facility]  on 5-19-17 for RUQ abdominal pain and CBD dilation with possible surgery/imaging follow up.     Chief Complaint: Post-discharge f/u for Med Rec    Diabetes - Pt was told to stop taking Metformin due to CKD (noted in discharge papers). Patient was taking 500 mg Extended release tablets - 1 with/after breakfast, 1 with/after dinner. Patient occasionally forgets taking after-dinner Metformin.   Also--she is questioning her accuracy of her 5 year old one touch ultra mini bs meter .           Patient reports she is taking 2 10mEq potassium tablets twice a day. Last serum potassium at discharge (5/19/17 at 1:45 PM) was 3.6 (previous potassium on 5/19/17 at 6:02 AM was 3.2)     Personal Healthcare Goals: a1c, at goal, stay out of hospital.    Allergies/ADRs: Reviewed in Epic  Tobacco: No tobacco use --quit -30 years ago.   Alcohol: none  Caffeine: 40 ounces/day of Tea.  Activity: health rider and exercises around house, does push-ups on kitchen counter sink, leg raises before getting out of bed, in home help people help with exercise regimen  PMH: Reviewed in Epic        Medication Adherence: issues found and discussed below      RUQ pain:  Pt. Has suspected gall stone -- caused her enough discomfort to be hospitalized x 2 days but did not require surgery at this time. No new discharge meds for this condition.       Diabetes:  Pt currently taking glimep 4mg bid  + her met er pills have been on hold since hospitalized. Pt is not experiencing side effects.  SMBG: a few times a week.   Ranges (patient reported): her one touch ultra mini bs meter seems to have too much variance in readings --she is asking about a new meter today --can she get one?   Patient is not experiencing hypoglycemia  Recent symptoms of high blood sugar? none  Eye exam: up to date  Foot exam:  up to date  Microalbumin is < 30 mg/g. Pt is not taking an ACEi/ARB.  Aspirin: Taking 325mg daily and denies side effects  Diet/Exercise: no exercise --walks gingerly with a cane , likes carbs in diet but watches them closely now.     Constipation:   Patient is using Senna-S everyday for constipation, with occasional miralax use - was told to not use miralax every day per someone here at [facility]?. Has a bowel movement every 3-5 days. Had last bowel movement yesterday, but feels constipated today. Patient does not want to get any diarrhea. She is asking if she can take miralax daily?              Current labs include:  BP Readings from Last 3 Encounters:   05/23/17 136/80   05/19/17 126/40   05/17/17 136/77     Today's Vitals: /80  Pulse 84  SpO2 93%  No weight today --patient in wheelchair.     Most Recent Immunizations   Administered Date(s) Administered     Influenza (High Dose) 3 valent vaccine 01/27/2016     Influenza (IIV3) 01/10/2013     Pneumococcal (PCV 13) 03/09/2016     Pneumococcal 23 valent 01/22/2014     TDAP Vaccine (Adacel) 07/07/2008     ASSESSMENT:                 Current medications were reviewed today.      Diabetes: Stable. Patient is meeting A1c goal of < 8% (Last A1C in march was 7.2%). Metformin 500 mg ER tablet was D/C'd at discharge from recent hospitalization due to CKD concerns. Patient's kidney function appears to be stable at estimated 53 ml/min at discharge - possible to restart metformin 500 mg ER BID after meals - see plan for details.     Patient is likely due for A1C check at next [facility] visit based on last report is from March of 2017.     Lab Results   Component Value Date    A1C 7.2 03/08/2017    A1C 7.3 12/23/2016    A1C 8.8 08/10/2016    A1C 7.9 04/05/2016    A1C 9.1 12/21/2015       Constipation: Based on patient's self-report of a bowel movement every 3-5 days, continue Senna-S daily, and consider adding 1 capful of Miralax daily to produce bowel movements every 24-48  hours. See Plan for details.      Medication Adherence: Patient occasionally forgets to take bedtime dose of medications due to falling asleep (Mainly Metformin). Otherwise no reported issues with med adherence.       PLAN:                1. Constipation - you can keep using Senna-S every day, and you should start taking 1 capful of Miralax powder mixed with a glass of juice/water every day - want to have a bowel movement at least every 24-48 hours. If no bowel movement in 2 days, you can call me. If bowel movements become more regular, or if you start having more than one bowel movement per day, you can consider taking less miralax (such as 1/2 cap per day).    2. Diabetes - You can restart the Metformin 500 mg ER tablets - 1 tablet with/after breakfast, and 1 tablet with/after dinner. Try to remember to take the after-dinner Metformin.            Next [facility] visit: Wednesday, November 15th at 11:30 AM.      I spent 50 minutes with this patient today.  All changes were made via collaborative practice agreement with Rafaela William. A copy of the visit note was provided to the patient's primary care provider.        The patient was given a summary of these recommendations as an after visit summary.    Karen Hilton [facility].  Medication Therapy Management Provider  [phone]  Dex Smith, Pharm-D-4.

## 2017-05-24 ENCOUNTER — CARE COORDINATION (OUTPATIENT)
Dept: GASTROENTEROLOGY | Facility: CLINIC | Age: 72
End: 2017-05-24

## 2017-05-24 DIAGNOSIS — D49.0 IPMN (INTRADUCTAL PAPILLARY MUCINOUS NEOPLASM): Primary | ICD-10-CM

## 2017-05-24 NOTE — PROGRESS NOTES
Received message that she needs outpatient imaging. Chart reviewed by this RN and she will need an MRCP for:  Two subcentimeter cystic foci in the pancreas which may represent  simple cysts or side branch IPMN.  Consider further evaluation with  pancreas MRI on a non-emergent basis.    Called Mariel and she is willing to do MRI but she is 306lbs and states she cannot lie flat without having breathing issues. She also states the tubes are usually too small for her to be comfortable in.   Advised will check with Dr. Almodovar to see if there is a different test that can be done.   Verbalized understanding.     Gaby ROBLES RN Coordinator  Dr. Noguera, Dr. Almodovar & Dr. Grubbs  Pancreas~Biliary  [phone] #4

## 2017-05-24 NOTE — PROGRESS NOTES
"Message received from Dr. Almodovar in regards to options about imaging since Mariel states she is unable to lie flat because she has breathing issues and she will only do it in an upright scanner:  Pancreatic protocol CT if her kidneys are healthy   [place]     Orders placed for CT scan.   But there is indication for her that she has a contrast allergy: spoke to her and she states she \"\" the last time she had a surgery and was given contrast so a CT is probably not a valid option. Removed order for now.   Called [facility] to verify if they are able to do an secretin MRCP with the upright scanner.   Spoke to Sophia at [facility] and she states the only places they are able to do MRCP with secretin are at the [place] and [facility] locations and both scanners are closed tubes.   Will need to send message to MD's to look at other options.    Gaby ROBLES, RN Coordinator  Dr. Noguera, Dr. Almodovar & Dr. Grubbs  Pancreas~Biliary  [phone] #4          "

## 2017-05-25 ENCOUNTER — OFFICE VISIT (OUTPATIENT)
Dept: FAMILY MEDICINE | Facility: CLINIC | Age: 72
End: 2017-05-25
Payer: MEDICARE

## 2017-05-25 ENCOUNTER — TELEPHONE (OUTPATIENT)
Dept: FAMILY MEDICINE | Facility: CLINIC | Age: 72
End: 2017-05-25

## 2017-05-25 VITALS
RESPIRATION RATE: 20 BRPM | WEIGHT: 293 LBS | BODY MASS INDEX: 48.33 KG/M2 | TEMPERATURE: 97.6 F | OXYGEN SATURATION: 96 % | SYSTOLIC BLOOD PRESSURE: 143 MMHG | DIASTOLIC BLOOD PRESSURE: 78 MMHG | HEART RATE: 69 BPM

## 2017-05-25 DIAGNOSIS — Z87.448 H/O CHRONIC KIDNEY DISEASE: ICD-10-CM

## 2017-05-25 DIAGNOSIS — K80.10 CALCULUS OF GALLBLADDER WITH CHRONIC CHOLECYSTITIS WITHOUT OBSTRUCTION: Primary | ICD-10-CM

## 2017-05-25 DIAGNOSIS — I50.32 CHRONIC DIASTOLIC HEART FAILURE (H): ICD-10-CM

## 2017-05-25 DIAGNOSIS — R60.9 EDEMA, UNSPECIFIED TYPE: ICD-10-CM

## 2017-05-25 DIAGNOSIS — E11.22 TYPE 2 DIABETES MELLITUS WITH CHRONIC KIDNEY DISEASE, WITHOUT LONG-TERM CURRENT USE OF INSULIN, UNSPECIFIED CKD STAGE (H): ICD-10-CM

## 2017-05-25 DIAGNOSIS — M54.9 ACUTE MIDLINE BACK PAIN, UNSPECIFIED BACK LOCATION: ICD-10-CM

## 2017-05-25 PROCEDURE — 99214 OFFICE O/P EST MOD 30 MIN: CPT | Performed by: FAMILY MEDICINE

## 2017-05-25 RX ORDER — FUROSEMIDE 40 MG
80 TABLET ORAL 2 TIMES DAILY
Qty: 120 TABLET | Refills: 6 | COMMUNITY
Start: 2017-05-25 | End: 2018-01-23

## 2017-05-25 RX ORDER — LIDOCAINE 50 MG/G
PATCH TOPICAL
Qty: 30 PATCH | Refills: 0 | Status: ON HOLD | OUTPATIENT
Start: 2017-05-25 | End: 2017-08-08

## 2017-05-25 ASSESSMENT — ENCOUNTER SYMPTOMS
COUGH: 1
CONSTIPATION: 1
VOMITING: 0
DYSURIA: 0
HEMOPTYSIS: 0
ABDOMINAL PAIN: 1
SHORTNESS OF BREATH: 0
FEVER: 0
SPUTUM PRODUCTION: 1
PALPITATIONS: 0
WHEEZING: 0
BACK PAIN: 1
NAUSEA: 0
DIARRHEA: 0

## 2017-05-25 NOTE — NURSING NOTE
"Chief Complaint   Patient presents with     Hospital F/U       Initial /78 (BP Location: Right arm, Patient Position: Chair, Cuff Size: Adult Regular)  Pulse 69  Temp 97.6  F (36.4  C) (Oral)  Resp 20  Wt (!) 304 lb (137.9 kg)  SpO2 96%  BMI 48.33 kg/m2 Estimated body mass index is 48.33 kg/(m^2) as calculated from the following:    Height as of 5/17/17: 5' 6.5\" (1.689 m).    Weight as of this encounter: 304 lb (137.9 kg).  Medication Reconciliation: unable or not appropriate to perform         Brionna Hernandez MA      "

## 2017-05-25 NOTE — MR AVS SNAPSHOT
After Visit Summary   5/25/2017    Mariel Ribeiro    MRN: [id number]           Patient Information     Date Of Birth          1945        Visit Information        Provider Department      5/25/2017 11:00 AM Rafaela William MD [facility]        Today's Diagnoses     Calculus of gallbladder with chronic cholecystitis without obstruction    -  1    Acute midline back pain, unspecified back location        H/O chronic kidney disease        Type 2 diabetes mellitus with chronic kidney disease, without long-term current use of insulin, unspecified CKD stage (H)        Edema, unspecified type        Chronic diastolic heart failure (H)           Follow-ups after your visit        Your next 10 appointments already scheduled     Jun 01, 2017  4:30 PM CDT   (Arrive by 4:15 PM)   New Patient Visit with Jeffrey Roberson MD   [facility] ([facility] [place])    [address] [place] [id number]   [phone]            Jun 02, 2017 11:30 AM CDT   Treatment 45 with Liudmila Harper, PT   [facility] [place], Physical Therapy - Outpatient ([facility])    [address] [place] [address], [address]  [place] [address]   [phone]            Nov 15, 2017 11:30 AM CST   SHORT with Karen Hilton RPH   [facility] ([facility])    [address] [place] [address]  [address]  [place] [id number] [phone]              Who to contact     If you have questions or need follow up information about today's clinic visit or your schedule please contact [facility] directly at [phone].  Normal or non-critical lab and imaging results will be communicated to you by MyChart, letter or phone within 4 business days after the clinic has received the results. If you do not hear from us within 7 days, please contact the clinic through MyChart or phone. If you have  a critical or abnormal lab result, we will notify you by phone as soon as possible.  Submit refill requests through [employer] or call your pharmacy and they will forward the refill request to us. Please allow 3 business days for your refill to be completed.          Additional Information About Your Visit        [employer] Information     [employer] gives you secure access to your electronic health record. If you see a primary care provider, you can also send messages to your care team and make appointments. If you have questions, please call your primary care clinic.  If you do not have a primary care provider, please call [phone] and they will assist you.        Care EveryWhere ID     This is your Care EveryWhere ID. This could be used by other organizations to access your [facility] medical records  [id number]        Your Vitals Were     Pulse Temperature Respirations Pulse Oximetry BMI (Body Mass Index)       69 97.6  F (36.4  C) (Oral) 20 96% 48.33 kg/m2        Blood Pressure from Last 3 Encounters:   05/25/17 143/78   05/23/17 136/80   05/19/17 126/40    Weight from Last 3 Encounters:   05/25/17 (!) 304 lb (137.9 kg)   05/18/17 (!) 306 lb 8 oz (139 kg)   05/17/17 (!) 313 lb (142 kg)              Today, you had the following     No orders found for display         Today's Medication Changes          These changes are accurate as of: 5/25/17  5:56 PM.  If you have any questions, ask your nurse or doctor.               Start taking these medicines.        Dose/Directions    lidocaine 5 % Patch   Commonly known as:  LIDODERM   Used for:  Acute midline back pain, unspecified back location   Started by:  Rafaela William MD        Apply up to 3 patches to painful area at once for up to 12 h within a 24 h period.  Remove after 12 hours.   Quantity:  30 patch   Refills:  0         These medicines have changed or have updated prescriptions.        Dose/Directions    furosemide 40 MG tablet   Commonly known as:  LASIX    This may have changed:  how much to take   Used for:  Chronic diastolic heart failure (H)   Changed by:  Rafaela William MD        Dose:  60 mg   Take 1.5 tablets (60 mg) by mouth 2 times daily   Quantity:  120 tablet   Refills:  6            Where to get your medicines      Some of these will need a paper prescription and others can be bought over the counter.  Ask your nurse if you have questions.     Bring a paper prescription for each of these medications     lidocaine 5 % Patch                Primary Care Provider Office Phone # Fax #    Rafaela William [phone]       [facility] [address]  [place] [address]        Thank you!     Thank you for choosing [facility]  for your care. Our goal is always to provide you with excellent care. Hearing back from our patients is one way we can continue to improve our services. Please take a few minutes to complete the written survey that you may receive in the mail after your visit with us. Thank you!             Your Updated Medication List - Protect others around you: Learn how to safely use, store and throw away your medicines at www.disposemymeds.org.          This list is accurate as of: 5/25/17  5:56 PM.  Always use your most recent med list.                   Brand Name Dispense Instructions for use    albuterol 108 (90 BASE) MCG/ACT Inhaler    albuterol    1 Inhaler    INHALE 1 TO 2 PUFFS EVERY 4 TO 6 HOURS AS NEEDED       aspirin  MG EC tablet     30 tablet    Take 1 tablet by mouth daily.       atorvastatin 40 MG tablet    LIPITOR    90 tablet    TAKE 1 TABLET(40 MG) BY MOUTH DAILY       blood glucose monitoring test strip    no brand specified    200 each    1 strip by In Vitro route 2 times daily Strips per patient's preference       doxycycline 100 MG capsule    VIBRAMYCIN    20 capsule    Take 1 capsule (100 mg) by mouth 2 times daily       EPINEPHrine 0.3 MG/0.3ML injection      0.6 mL    Inject 0.3 mLs (0.3 mg) into the muscle once as needed for anaphylaxis       escitalopram 20 MG tablet    LEXAPRO    90 tablet    Take 1 tablet (20 mg) by mouth every morning       febuxostat 40 MG Tabs tablet    ULORIC    90 tablet    Take 1 tablet (40 mg) by mouth every evening       fluticasone 100 MCG/BLIST Aepb    FLOVENT DISKUS    60 Inhaler    Inhale 1 puff into the lungs every 12 hours       folic acid 800 MCG Tabs      Take 800 mcg by mouth daily.       furosemide 40 MG tablet    LASIX    120 tablet    Take 1.5 tablets (60 mg) by mouth 2 times daily       glimepiride 4 MG tablet    AMARYL    180 tablet    Take 1 tablet (4 mg) by mouth 2 times daily (with meals)       GUAIFENESIN  MG Tbcr     30    Take 600 mg by mouth twice daily       levothyroxine 175 MCG tablet    SYNTHROID/LEVOTHROID    90 tablet    Take 1 tablet (175 mcg) by mouth daily Repeat labs in 4-6 weeks       lidocaine 5 % Patch    LIDODERM    30 patch    Apply up to 3 patches to painful area at once for up to 12 h within a 24 h period.  Remove after 12 hours.       [employer] FINEPOINT LANCETS Misc     100 each    Use to test blood sugars 2 times daily or as directed.       medroxyPROGESTERone 10 MG tablet    PROVERA    90 tablet    Take 1 tablet (10 mg) by mouth daily       metFORMIN 500 MG 24 hr tablet    GLUCOPHAGE-XR     Take 500 mg by mouth 2 times daily (with meals) Reported on 5/23/2017       methylPREDNISolone 4 MG tablet    MEDROL DOSEPAK    21 tablet    Follow package instructions       Miconazole Nitrate 2 % Powd     100 g    Apply to rash on chest three times daily.  Continue for one week after the rash has resolved.       morphine 15 MG IR tablet   Start taking on:  6/6/2017    MSIR    60 tablet    Take 1 tablet (15 mg) by mouth 2 times daily       niacin 500 MG CR tablet    NIASPAN    180 tablet    Take 1 tablet (500 mg) by mouth 2 times daily       nitroglycerin 0.4 MG sublingual tablet    NITROSTAT    25 tablet     Place 1 tablet (0.4 mg) under the tongue every 5 minutes as needed for chest pain As needed for chest pain.       ONE TOUCH ULTRA (DEVICES) Misc     1    test blood sugar BID       polyethylene glycol powder    MIRALAX    510 g    Take 17 grams by mouth once to twice daily.       potassium chloride SA 10 MEQ CR tablet    K-DUR/KLOR-CON M    120 tablet    Take 2 tablets (20 mEq) by mouth 2 times daily       pregabalin 100 MG capsule    LYRICA    180 capsule    Take 1 capsule (100 mg) by mouth 2 times daily       senna-docusate 8.6-50 MG per tablet    SENOKOT-S;PERICOLACE    60 tablet    Take 1-2 tablets by mouth 2 times daily as needed for constipation       sitagliptin 50 MG tablet    JANUVIA    90 tablet    Take 1 tablet (50 mg) by mouth daily       tiZANidine 4 MG tablet    ZANAFLEX    90 tablet    TAKE 1 TABLET BY MOUTH DAILY AS NEEDED.       traZODone 50 MG tablet    DESYREL    90 tablet    Take 3 tablets (150 mg) by mouth At Bedtime       vitamin D3 5000 UNIT/ML Liqd      Take 10,000 Units by mouth daily

## 2017-05-25 NOTE — TELEPHONE ENCOUNTER
Spoke with Dr. William and she wants pt to take 80 mg in the morning and take 60 mg six hours later.  Called pt and informed her regarding the dosage.   Also told pt to watch out for any swelling in her legs with the dosage change and for her to call clinic back if swelling changes.      Brionna Hernandez MA
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
## 2017-05-25 NOTE — TELEPHONE ENCOUNTER
Pt was wondering if she can cut down on furosemide (LASIX) 40 MG tablet.  Pt state she is wetting her pants 3-4 times a day because of LASIX and want to cut down on the dosage.       Brionna Hernandez MA

## 2017-05-26 ENCOUNTER — CARE COORDINATION (OUTPATIENT)
Dept: GASTROENTEROLOGY | Facility: CLINIC | Age: 72
End: 2017-05-26

## 2017-05-26 DIAGNOSIS — K86.2 PANCREATIC CYST: Primary | ICD-10-CM

## 2017-05-26 NOTE — PROGRESS NOTES
Called Mariel and she is willing to do the lie down open MRI for a MRCP without secretin.   Ok to do MRCP without secretin per Dr. Almodovar.   Per [facility] MRCP without secretin can be done in [facility].     Faxing over to main scheduling line at [phone].  They will call Mariel to schedule her once they receive the order.     Gaby ROBLES RN Coordinator  Dr. Noguera, Dr. Almodovar & Dr. Grubbs  Pancreas~Biliary  [phone] #[phone]

## 2017-05-30 RX ORDER — LIDOCAINE 50 MG/G
PATCH TOPICAL
Qty: 30 PATCH | Refills: 0 | Status: CANCELLED | OUTPATIENT
Start: 2017-05-30

## 2017-05-30 NOTE — TELEPHONE ENCOUNTER
Rx was denied for below medication/s:    Med Prescribed:  LIDOCAINE PTCH  Reason:  PLAN DOES NOT COVER THIS MED.  Recommendations from Insurance:  [place]  Insurance Plan:  NOT GIVEN   Patient ID:  [id number]  BIN/RX#:  [id number]  Phone:  [phone]  Fax:       On current med list:  YES    Would you like to change Rx or start PA. If you would like to start PA please include any pertinent information as to why it is needed, other medications taken and reasons why other medication were discontinued.      Please forward to your MA pool.      Thank you,  Monica CAMPBELL (R)(M)

## 2017-05-31 ENCOUNTER — TELEPHONE (OUTPATIENT)
Dept: SURGERY | Facility: CLINIC | Age: 72
End: 2017-05-31
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
## 2017-06-01 ENCOUNTER — OFFICE VISIT (OUTPATIENT)
Dept: SURGERY | Facility: CLINIC | Age: 72
End: 2017-06-01

## 2017-06-01 VITALS
DIASTOLIC BLOOD PRESSURE: 71 MMHG | OXYGEN SATURATION: 93 % | WEIGHT: 293 LBS | SYSTOLIC BLOOD PRESSURE: 144 MMHG | TEMPERATURE: 98.1 F | HEIGHT: 67 IN | BODY MASS INDEX: 45.99 KG/M2 | HEART RATE: 77 BPM

## 2017-06-01 DIAGNOSIS — K81.9 CHOLECYSTITIS: Primary | ICD-10-CM

## 2017-06-01 RX ORDER — METFORMIN HCL 500 MG
TABLET, EXTENDED RELEASE 24 HR ORAL
Qty: 360 TABLET | Refills: 0 | Status: ON HOLD | OUTPATIENT
Start: 2017-06-01 | End: 2017-08-08

## 2017-06-01 ASSESSMENT — PAIN SCALES - GENERAL: PAINLEVEL: SEVERE PAIN (7)

## 2017-06-01 NOTE — TELEPHONE ENCOUNTER
Called pt and pt said she will pay out of pocket for it. Pt understand that insurance won't cover it.  Looks like PA was already created and it was denied because the use of the medication is not supported by the FDA in the medical condition.      Brionna Hernandez MA

## 2017-06-01 NOTE — PROGRESS NOTES
REASON FOR VISIT:  Mariel Ribeiro is a 72-year-old female well known to the Surgery Service.  She was evaluated in hospital for cholecystitis.  The patient has significant comorbidities including obesity and arthritis as well as a very significant heart condition.  The patient was evaluated in the hospital and thought not to be a candidate for urgent surgery.  The patient is currently losing weight.  She has consistent abdominal pain that she associates with body positioning.  The patient has a hospital bed or a bed that at least the head of bed can be brought up comfortably and she is sleeping comfortably in bed.  Patient is on multiple medications.  Has significant medication allergies as well as fish allergies and peanut allergies.  The patient otherwise is doing well and actively walking in the good weather this summer.  The patient has increased her activities and would like to be on a program to reduce weight.      PHYSICAL EXAMINATION:  Mariel Ribeiro moves easily to the examination table.  Abdomen is soft and nontender.  She does have a positive Alvarez's sign and some tenderness under the right costal margin that is point focal and reproducible.  The remainder of the abdominal exam demonstrates no masses, hernias or other abnormalities other than a reducible umbilical hernia that is a fascial defect of less than 1 cm.  She also has a diastasis recti which is unremarkable.  The remainder of her examination demonstrates an obese patient in no apparent distress today.  Her extremities are warm and well-perfused.  She does have significant chronic edema of the lower extremities.  An Ace wrap is present for a compression dressing on right and left lower extremities.      IMPRESSION:  Mariel Ribeiro has what appears to be possible cholecystitis with a Alvarez's sign positive.  Ultrasound demonstrates a presence of cholelithiasis and the hepatobiliary scan demonstrates less than 20% ejection fraction.  The patient was  given full informed consent regarding the risks and benefits of surgery for a patient with her medical comorbidities including her heart disease.  The patient will be evaluated by her cardiologist and primary care physician.  I have discussed with her the details regarding laparoscopic cholecystectomy which the patient will attempt to manage this conservatively and avoid surgery.  I would like to re-evaluate the patient and hopefully the patient will be doing lifestyle modifications and I might be able to discuss it further with her and re-examine her since she does have a positive Alvarez's sign at this time.  We would hope that the patient will continue to be active and lose weight and we would discuss with her and re-examine to be sure that the gallbladder has been decreasing in symptomatology and hopefully responding to nonoperative therapy.  The patient may also consider the use of Actigall as an alternative to surgery.  She will discuss this as an additional medication with her primary care physician or cardiologist.  The patient will call or return should she develop significant problems, jaundice, nausea, vomiting and uncontrollable pain.  She will call or she will return to the emergency room should this occur associated with fever or other conditions, possibly related to cholecystitis.      Jeffrey Roberson MD

## 2017-06-01 NOTE — LETTER
6/1/2017       RE: Mariel Ribeiro  [address] [place] [address]     Dear Colleague,    Thank you for referring your patient, Mariel Ribeiro, to the [facility] SURGERY at [facility]. Please see a copy of my visit note below.    REASON FOR VISIT:  Mariel Ribeiro is a 72-year-old female well known to the Surgery Service.  She was evaluated in hospital for cholecystitis.  The patient has significant comorbidities including obesity and arthritis as well as a very significant heart condition.  The patient was evaluated in the hospital and thought not to be a candidate for urgent surgery.  The patient is currently losing weight.  She has consistent abdominal pain that she associates with body positioning.  The patient has a hospital bed or a bed that at least the head of bed can be brought up comfortably and she is sleeping comfortably in bed.  Patient is on multiple medications.  Has significant medication allergies as well as fish allergies and peanut allergies.  The patient otherwise is doing well and actively walking in the good weather this summer.  The patient has increased her activities and would like to be on a program to reduce weight.      PHYSICAL EXAMINATION:  Mariel Ribeiro moves easily to the examination table.  Abdomen is soft and nontender.  She does have a positive Alvarez's sign and some tenderness under the right costal margin that is point focal and reproducible.  The remainder of the abdominal exam demonstrates no masses, hernias or other abnormalities other than a reducible umbilical hernia that is a fascial defect of less than 1 cm.  She also has a diastasis recti which is unremarkable.  The remainder of her examination demonstrates an obese patient in no apparent distress today.  Her extremities are warm and well-perfused.  She does have significant chronic edema of the lower extremities.  An Ace wrap is present for a compression dressing on  right and left lower extremities.      IMPRESSION:  Mariel Ribeiro has what appears to be possible cholecystitis with a Alvarez's sign positive.  Ultrasound demonstrates a presence of cholelithiasis and the hepatobiliary scan demonstrates less than 20% ejection fraction.  The patient was given full informed consent regarding the risks and benefits of surgery for a patient with her medical comorbidities including her heart disease.  The patient will be evaluated by her cardiologist and primary care physician.  I have discussed with her the details regarding laparoscopic cholecystectomy which the patient will attempt to manage this conservatively and avoid surgery.  I would like to re-evaluate the patient and hopefully the patient will be doing lifestyle modifications and I might be able to discuss it further with her and re-examine her since she does have a positive Alvarez's sign at this time.  We would hope that the patient will continue to be active and lose weight and we would discuss with her and re-examine to be sure that the gallbladder has been decreasing in symptomatology and hopefully responding to nonoperative therapy.  The patient may also consider the use of Actigall as an alternative to surgery.  She will discuss this as an additional medication with her primary care physician or cardiologist.  The patient will call or return should she develop significant problems, jaundice, nausea, vomiting and uncontrollable pain.  She will call or she will return to the emergency room should this occur associated with fever or other conditions, possibly related to cholecystitis.      Jeffrey Roberson MD

## 2017-06-01 NOTE — NURSING NOTE
"Chief Complaint   Patient presents with     Clinic Care Coordination - Initial     new       Vitals:    06/01/17 1600   BP: 144/71   Pulse: 77   Temp: 98.1  F (36.7  C)   TempSrc: Oral   SpO2: 93%   Weight: (!) 305 lb   Height: 5' 6.5\"       Body mass index is 48.49 kg/(m^2).      WOUND EVALUATION:                          Keila RONDON LPN                          "

## 2017-06-01 NOTE — TELEPHONE ENCOUNTER
Diabetic labs due in Sept.   Refilled per [facility] refill policyf for 90 days.    Candace Tello RN

## 2017-06-03 DIAGNOSIS — E78.5 HYPERLIPIDEMIA WITH TARGET LDL LESS THAN 70: ICD-10-CM

## 2017-06-04 NOTE — TELEPHONE ENCOUNTER
Medication Detail      Disp Refills Start End WINSOME   niacin (NIASPAN) 500 MG CR tablet 180 tablet 3 5/11/2016  No   Sig: Take 1 tablet (500 mg) by mouth 2 times daily           Last Office Visit with [facility], MAURICE or [facility] prescribing provider: 5/25/2017

## 2017-06-07 ENCOUNTER — TELEPHONE (OUTPATIENT)
Dept: FAMILY MEDICINE | Facility: CLINIC | Age: 72
End: 2017-06-07

## 2017-06-07 RX ORDER — NIACIN 500 MG/1
TABLET, EXTENDED RELEASE ORAL
Qty: 180 TABLET | Refills: 1 | Status: ON HOLD | OUTPATIENT
Start: 2017-06-07 | End: 2018-07-22

## 2017-06-14 ENCOUNTER — OFFICE VISIT (OUTPATIENT)
Dept: GASTROENTEROLOGY | Facility: CLINIC | Age: 72
End: 2017-06-14

## 2017-06-14 NOTE — MR AVS SNAPSHOT
MRN:[id number]                      After Visit Summary   6/14/2017    Mariel Ribeiro    MRN: [id number]           Visit Information        Provider Department      6/14/2017 2:00 PM Mariella Armas RD [facility] Gastro[facility]ology and IBD        Your next 10 appointments already scheduled     Aug 10, 2017  3:00 PM CDT   (Arrive by 2:45 PM)   New Patient Visit with MD GRANT Lundy [facility] General Surgery ([facility] [place])    [address]  [address] [facility] [address]   [phone]            Nov 15, 2017 11:30 AM CST   [facility]    [address] [place] [address] [phone]              [employer] Information     [employer] gives you secure access to your electronic health record. If you see a primary care provider, you can also send messages to your care team and make appointments. If you have questions, please call your primary care clinic.  If you do not have a primary care provider, please call [phone] and they will assist you.      [employer] is an electronic gateway that provides easy, online access to your medical records. With [employer], you can request a clinic appointment, read your test results, renew a prescription or communicate with your care team.     To access your existing account, please contact your [facility] or call [phone] for assistance.        Care EveryWhere ID     This is your Care EveryWhere ID. This could be used by other organizations to access your [place] medical records  [id number]

## 2017-06-14 NOTE — LETTER
"6/14/2017       RE: Mariel Ribeiro  [address] [place] [address]     Dear Colleague,    Thank you for referring your patient, Mariel Ribeiro, to the [facility] GASTROENTEROLOGY AND IBD at [facility]. Please see a copy of my visit note below.    CLINICAL NUTRITION SERVICES - ASSESSMENT NOTE    REASON FOR ASSESSMENT  Mariel Ribeiro is a 72 year old female seen by the dietitian for Medical Nutrition Therapy related obesity referred by Dr. Roberson.  Pt accompanied by her friend, Marta.    NUTRITION HISTORY  Mariel reports that she has lost ~37 lbs in the past 6 months with improvement in diet.    She has been limiting sweets to 1 x per week versus daily.    She eats 3 meals/day and tends to eat lean protein with each meal.   She expresses her desire to lose ~15 lbs in the month since she saw Dr. Roberson.  She is hoping she can have gallbladder surgery with weight loss success.      Diet recall:  B - 1 svg home made egg bake (~250kcal)  L - 2 yogurts, 1 svg home made egg bake (~250kcal)  D - grilled chicken or pork, sweet potato, cooked vegetables  Snack - none  Beverages - >48 oz water/day     ANTHROPOMETRICS  Height: 66\"  Weight: 303 lb/138 kg  BMI: 48  Weight Status:  Obesity Grade III BMI >40  IBW: 130 lbs  % IBW: 233%  Weight History:   Wt Readings from Last 5 Encounters:   06/01/17 (!) 138.3 kg (305 lb)   05/25/17 (!) 137.9 kg (304 lb)   05/18/17 (!) 139 kg (306 lb 8 oz)   05/17/17 (!) 142 kg (313 lb)   04/07/17 (!) 142 kg (313 lb)       Dosing Weight: 173kg    LABS  Labs reviewed    MEDICATIONS/VITAMINS/MINERALS  Medications reviewed    ASSESSED NUTRITION NEEDS:  Estimated Energy Needs: 2423-3480 kcals (20-25 Kcal/Kg)  Justification: obese  Estimated Protein Needs: 75 grams protein (1 g pro/Kg)  Justification: maintenance  Estimated Fluid Needs: 2000  mL  Justification: maintenance    NUTRITION DIAGNOSIS:  Obesity related to self-monitoring deficit, excessive " "caloric intake AEB diet recall BMI >35    INTERVENTIONS  Recommendations / Nutrition Prescription  1. 1500kcal - tracking daily intake   Nutrition Intervention:  Discussed dietary and behavioral modifications to promote weight loss (portion control, meal consistency, measuring foods, 9\"portion plate method, fiber sources, protein sources, eating pace, exercise and avoidance of disordered eating patterns.      Discussed fat intake - suggested she limit fat intake d/t gallbladder insufficiency.  Provided pt with low fat diet guidelines.     Provided pt with corresponding education materials -  1500-1700kcal meal plan, 100-kcal snack ideas, list of quick meal ideas and resources for healthful living websites.  Pt verbalized understanding of education provided.  Expected diet compliance: Good    Nutrition Goals:  1. Aim for <1700kcal/day - start tracking daily intake as able  2. Weight loss (1-2 lb /week desirable)    Monitoring/Follow-up: Provided pt with RD contact information for further questions prn.  She will schedule to see RD in 1 month.     MONITORING AND EVALUATION:  -Food intake  -Weight trends      Time spent with patient: 60 minutes.  Mariella Archer, KATHARINE, LD      "

## 2017-06-14 NOTE — PROGRESS NOTES
"CLINICAL NUTRITION SERVICES - ASSESSMENT NOTE    REASON FOR ASSESSMENT  Mariel Ribeiro is a 72 year old female seen by the dietitian for Medical Nutrition Therapy related obesity referred by Dr. Roberson.  Pt accompanied by her friend, Marta.    NUTRITION HISTORY  Mariel reports that she has lost ~37 lbs in the past 6 months with improvement in diet.    She has been limiting sweets to 1 x per week versus daily.    She eats 3 meals/day and tends to eat lean protein with each meal.   She expresses her desire to lose ~15 lbs in the month since she saw Dr. Roberson.  She is hoping she can have gallbladder surgery with weight loss success.      Diet recall:  B - 1 svg home made egg bake (~250kcal)  L - 2 yogurts, 1 svg home made egg bake (~250kcal)  D - grilled chicken or pork, sweet potato, cooked vegetables  Snack - none  Beverages - >48 oz water/day     ANTHROPOMETRICS  Height: 66\"  Weight: 303 lb/138 kg  BMI: 48  Weight Status:  Obesity Grade III BMI >40  IBW: 130 lbs  % IBW: 233%  Weight History:   Wt Readings from Last 5 Encounters:   06/01/17 (!) 138.3 kg (305 lb)   05/25/17 (!) 137.9 kg (304 lb)   05/18/17 (!) 139 kg (306 lb 8 oz)   05/17/17 (!) 142 kg (313 lb)   04/07/17 (!) 142 kg (313 lb)       Dosing Weight: 173kg    LABS  Labs reviewed    MEDICATIONS/VITAMINS/MINERALS  Medications reviewed    ASSESSED NUTRITION NEEDS:  Estimated Energy Needs: 5590-9871 kcals (20-25 Kcal/Kg)  Justification: obese  Estimated Protein Needs: 75 grams protein (1 g pro/Kg)  Justification: maintenance  Estimated Fluid Needs: 2000  mL  Justification: maintenance    NUTRITION DIAGNOSIS:  Obesity related to self-monitoring deficit, excessive caloric intake AEB diet recall BMI >35    INTERVENTIONS  Recommendations / Nutrition Prescription  1. 1500kcal - tracking daily intake   Nutrition Intervention:  Discussed dietary and behavioral modifications to promote weight loss (portion control, meal consistency, measuring foods, 9\"portion " plate method, fiber sources, protein sources, eating pace, exercise and avoidance of disordered eating patterns.      Discussed fat intake - suggested she limit fat intake d/t gallbladder insufficiency.  Provided pt with low fat diet guidelines.     Provided pt with corresponding education materials -  1500-1700kcal meal plan, 100-kcal snack ideas, list of quick meal ideas and resources for healthful living websites.  Pt verbalized understanding of education provided.  Expected diet compliance: Good    Nutrition Goals:  1. Aim for <1700kcal/day - start tracking daily intake as able  2. Weight loss (1-2 lb /week desirable)    Monitoring/Follow-up: Provided pt with RD contact information for further questions prn.  She will schedule to see RD in 1 month.     MONITORING AND EVALUATION:  -Food intake  -Weight trends      Time spent with patient: 60 minutes.  Mariella Archer RD, LD

## 2017-06-28 ENCOUNTER — RADIANT APPOINTMENT (OUTPATIENT)
Dept: GENERAL RADIOLOGY | Facility: CLINIC | Age: 72
End: 2017-06-28
Attending: FAMILY MEDICINE
Payer: MEDICARE

## 2017-06-28 ENCOUNTER — TELEPHONE (OUTPATIENT)
Dept: FAMILY MEDICINE | Facility: CLINIC | Age: 72
End: 2017-06-28

## 2017-06-28 DIAGNOSIS — M25.561 ACUTE PAIN OF RIGHT KNEE: ICD-10-CM

## 2017-06-28 DIAGNOSIS — M25.561 ACUTE PAIN OF RIGHT KNEE: Primary | ICD-10-CM

## 2017-06-28 PROCEDURE — 73562 X-RAY EXAM OF KNEE 3: CPT | Mod: LT

## 2017-06-28 NOTE — TELEPHONE ENCOUNTER
Left message that x ray was ordered and to call [phone] to schedule the x ray.  Hetal Mane RN

## 2017-06-28 NOTE — TELEPHONE ENCOUNTER
Reason for call:  Order   Order or referral being requested: x-ray  Reason for request: chiro asked she have an x-ray of knee before adjusting. Fell 6/26 on her knee  Date needed: as soon as possible  Has the patient been seen by the PCP for this problem? NO    Additional comments: Patient is in front waiting for this    Phone number to reach patient:  Home number on file [phone] (home)    Best Time:  any    Can we leave a detailed message on this number?  YES

## 2017-06-28 NOTE — TELEPHONE ENCOUNTER
Pt notified.  Questions answered. She will call if no improvement or worsening after 7 days of RICE.Opal To RN

## 2017-07-03 NOTE — PROGRESS NOTES
Outpatient Physical Therapy Discharge Note     Patient: Mariel Ribeiro  : 1945    Beginning/End Dates of Reporting Period:  17 to 2017    Referring Provider: Jose Manuel Bains Diagnosis: post-concussive syndrome     Client Self Report: I had a fall yesterday over my dog. Hit L shoulder and L hip. I don't think I hit my head. My double vision is worse after the fall.     Objective Measurements:   Objective Measure: Nustep  Details: Seat 9, arms 8, WL 5, 10 mins, 695 steps.       Outcome Measures (most recent score):  Concussion Symptom Assessment (score out of 90). A higher score indicates greater impairment:  27    Goals:  Goal Identifier TUG   Goal Description Pt to demo low risk for falls as evidenced by TUG <13.5 secs with SEC.   Target Date 17   Date Met  17   Progress:     Goal Identifier 25' walk   Goal Description Pt demo improved efficiency and safety with gait via 25' walk <18.4 sec with SEC (20% improved).   Target Date 17   Date Met  17   Progress:     Goal Identifier Community mobility   Goal Description Pt demo improved activity tolerance via ability to amb >500' with 4WW for safe community mobility to allow for basic shopping on her own.   Target Date 17   Date Met  17   Progress:     Goal Identifier HEP   Goal Description Pt demo indep with HEP for balance, LE strength, endurance for reduction of concussion sx and ongoing wellness.   Target Date 17   Date Met   NOT MET   Progress: Pt has not been consistent with HEP.     Goal Identifier CSA   Goal Description Pt demo improved concussion sx to allow for greater engagement in life via CSA <38/90.   Target Date 17   Date Met  17   Progress:     Progress Toward Goals:   Progress this reporting period: improved activity tolerance, decreased neck pain.    Plan:  Discharge from therapy.    Discharge:    Reason for Discharge: Patient chooses to discontinue therapy.  Medicare G-code:  Patient did not attend a final scheduled session prior to discharge. Unable to determine discharge functional status.    Equipment Issued: none    Discharge Plan: Patient to continue home program.

## 2017-07-03 NOTE — ADDENDUM NOTE
Encounter addended by: Liudmila Harper, PT on: 7/3/2017  4:28 PM<BR>     Actions taken: Episode resolved, Pend clinical note, Sign clinical note

## 2017-07-05 ENCOUNTER — TELEPHONE (OUTPATIENT)
Dept: FAMILY MEDICINE | Facility: CLINIC | Age: 72
End: 2017-07-05

## 2017-07-05 NOTE — LETTER
"[facility]   [address], [place]  [address]  [phone]      July 5, 2017      Mariel Ribeiro  [address] [place] [address]              Dear Ms. Ribeiro,      We wanted to send you a reminder regarding colon cancer screening. According to our records it shows you are overdue for getting screened for colon cancer.     There is a couple ways that this can be done:    1. Colonoscopy- This is considered the \"gold\" standard. If the result is normal you wouldn't need to have another one for 10 years.  If you are interested in the colonoscopy please contact Central Scheduling at [phone]. They will assist you with finding the most convenient time and location.    2. FIT test- This is a take home stool test. You can  this kit from our lab along with simple instructions that the lab will provide. If everything is normal this test is good for 1 year. If you want the FIT test please call the clinic at [phone].    If you have had either of these tests done recently please let us know so that we can update our records.     If you are under/unisured, we recommend you contact the [employer] [facility] at [phone].They offer colonoscopy/FIT test at no charge or on a sliding fee charge.     Thank you in advance for taking the time to take care of your health.      Sincerely,     Your [facility] Care Team      DID YOU KNOW: Despite being one of the most treatable cancers. Colorectal Cancer is the nation's second leading cause of cancer death. Colorectal cancer begins 5 to 15 years before you ever have any symptoms.        "

## 2017-07-12 ENCOUNTER — TELEPHONE (OUTPATIENT)
Dept: FAMILY MEDICINE | Facility: CLINIC | Age: 72
End: 2017-07-12

## 2017-07-12 NOTE — TELEPHONE ENCOUNTER
MARTITA to schedule MA appointment for BP check. She can also go to any [place] pharmacy as well. Which ever one is more convenient for her.  Please schedule when she calls back.    Eve Laurent

## 2017-07-13 DIAGNOSIS — L30.4 INTERTRIGO: ICD-10-CM

## 2017-07-13 DIAGNOSIS — E11.22 TYPE 2 DIABETES MELLITUS WITH CHRONIC KIDNEY DISEASE, WITHOUT LONG-TERM CURRENT USE OF INSULIN, UNSPECIFIED CKD STAGE (H): ICD-10-CM

## 2017-07-13 DIAGNOSIS — E79.0 HYPERURICEMIA: ICD-10-CM

## 2017-07-13 RX ORDER — FEBUXOSTAT 40 MG/1
TABLET ORAL
Qty: 90 TABLET | Refills: 0 | OUTPATIENT
Start: 2017-07-13

## 2017-07-14 RX ORDER — MICONAZOLE NITRATE
POWDER (GRAM) MISCELLANEOUS
Qty: 100 G | Refills: 3 | Status: SHIPPED | OUTPATIENT
Start: 2017-07-14 | End: 2018-06-29

## 2017-07-14 RX ORDER — TOLNAFTATE 1 %
AEROSOL, SPRAY (GRAM) TOPICAL
Qty: 133 G | Refills: 0 | Status: ON HOLD | OUTPATIENT
Start: 2017-07-14 | End: 2017-08-08

## 2017-07-14 RX ORDER — GLIMEPIRIDE 4 MG/1
TABLET ORAL
Qty: 180 TABLET | Refills: 0 | Status: SHIPPED | OUTPATIENT
Start: 2017-07-14 | End: 2017-10-14

## 2017-07-14 NOTE — TELEPHONE ENCOUNTER
glimepiride (AMARYL) 4 MG tablet         Last Written Prescription Date: 1/16/2017  Last Fill Quantity: 180, # refills: 1  Last Office Visit with [facility] or [facility] prescribing provider:  5/25/2017        BP Readings from Last 3 Encounters:   06/01/17 144/71   05/25/17 143/78   05/23/17 136/80     Lab Results   Component Value Date    MICROL 20 03/08/2017     Lab Results   Component Value Date    UMALCR 19.33 03/08/2017     Creatinine   Date Value Ref Range Status   05/19/2017 1.03 0.52 - 1.04 mg/dL Final   ]  GFR Estimate   Date Value Ref Range Status   05/19/2017 53 (L) >60 mL/min/1.7m2 Final     Comment:     Non  GFR Calc   05/18/2017 47 (L) >60 mL/min/1.7m2 Final     Comment:     Non  GFR Calc   05/17/2017 48 (L) >60 mL/min/1.7m2 Final     Comment:     Non  GFR Calc     GFR Estimate If Black   Date Value Ref Range Status   05/19/2017 64 >60 mL/min/1.7m2 Final     Comment:      GFR Calc   05/18/2017 57 (L) >60 mL/min/1.7m2 Final     Comment:      GFR Calc   05/17/2017 58 (L) >60 mL/min/1.7m2 Final     Comment:      GFR Calc     Lab Results   Component Value Date    CHOL 186 03/08/2017     Lab Results   Component Value Date    HDL 50 03/08/2017     Lab Results   Component Value Date    LDL 89 03/08/2017     Lab Results   Component Value Date    TRIG 237 03/08/2017     Lab Results   Component Value Date    CHOLHDLRATIO 3.2 05/05/2015     Lab Results   Component Value Date    AST 18 05/19/2017     Lab Results   Component Value Date    ALT 21 05/19/2017     Lab Results   Component Value Date    A1C 7.2 03/08/2017    A1C 7.3 12/23/2016    A1C 8.8 08/10/2016    A1C 7.9 04/05/2016    A1C 9.1 12/21/2015     Potassium   Date Value Ref Range Status   05/19/2017 3.6 3.4 - 5.3 mmol/L Final       Prescription approved per [facility] Refill Protocol.    Signed Prescriptions:                        Disp   Refills    glimepiride (AMARYL) 4  MG tablet           180 ta*0        Sig: TAKE 1 TABLET BY MOUTH TWICE DAILY(WITH MEALS)  Authorizing Provider: NIKKIE MENA  Ordering User: ABIMBOLA ANN      Closing encounter - no further actions needed at this time    Abimbola Ann RN

## 2017-07-14 NOTE — TELEPHONE ENCOUNTER
Miconazole Nitrate 2 % POWD      Last Written Prescription Date: 10/27/2016  Last Fill Quantity: 100 g,  # refills: 3   Last Office Visit with [facility], [facility] or [facility] prescribing provider: 5/25/2017                  Routing refill request to provider for review/approval because:  Drug not on the [facility] refill protocol   Writer pended - generic order  - please deny brand name    Thank you,  Js Nagel RN

## 2017-08-07 ENCOUNTER — TELEPHONE (OUTPATIENT)
Dept: NURSING | Facility: CLINIC | Age: 72
End: 2017-08-07

## 2017-08-07 ENCOUNTER — NURSE TRIAGE (OUTPATIENT)
Dept: NURSING | Facility: CLINIC | Age: 72
End: 2017-08-07

## 2017-08-07 ENCOUNTER — HOSPITAL ENCOUNTER (INPATIENT)
Facility: CLINIC | Age: 72
LOS: 4 days | Discharge: HOME | DRG: 417 | End: 2017-08-12
Attending: EMERGENCY MEDICINE | Admitting: STUDENT IN AN ORGANIZED HEALTH CARE EDUCATION/TRAINING PROGRAM
Payer: MEDICARE

## 2017-08-07 ENCOUNTER — APPOINTMENT (OUTPATIENT)
Dept: GENERAL RADIOLOGY | Facility: CLINIC | Age: 72
DRG: 417 | End: 2017-08-07
Attending: EMERGENCY MEDICINE
Payer: MEDICARE

## 2017-08-07 ENCOUNTER — APPOINTMENT (OUTPATIENT)
Dept: ULTRASOUND IMAGING | Facility: CLINIC | Age: 72
DRG: 417 | End: 2017-08-07
Attending: EMERGENCY MEDICINE
Payer: MEDICARE

## 2017-08-07 DIAGNOSIS — K85.90 ACUTE PANCREATITIS, UNSPECIFIED COMPLICATION STATUS, UNSPECIFIED PANCREATITIS TYPE: ICD-10-CM

## 2017-08-07 LAB
ALBUMIN SERPL-MCNC: 3.4 G/DL (ref 3.4–5)
ALP SERPL-CCNC: 112 U/L (ref 40–150)
ALT SERPL W P-5'-P-CCNC: 25 U/L (ref 0–50)
ANION GAP SERPL CALCULATED.3IONS-SCNC: 13 MMOL/L (ref 3–14)
AST SERPL W P-5'-P-CCNC: 23 U/L (ref 0–45)
BASOPHILS # BLD AUTO: 0 10E9/L (ref 0–0.2)
BASOPHILS NFR BLD AUTO: 0.2 %
BILIRUB SERPL-MCNC: 0.4 MG/DL (ref 0.2–1.3)
BUN SERPL-MCNC: 25 MG/DL (ref 7–30)
CALCIUM SERPL-MCNC: 9.1 MG/DL (ref 8.5–10.1)
CHLORIDE SERPL-SCNC: 103 MMOL/L (ref 94–109)
CO2 SERPL-SCNC: 26 MMOL/L (ref 20–32)
CREAT SERPL-MCNC: 1.28 MG/DL (ref 0.52–1.04)
DIFFERENTIAL METHOD BLD: ABNORMAL
EOSINOPHIL # BLD AUTO: 0.2 10E9/L (ref 0–0.7)
EOSINOPHIL NFR BLD AUTO: 1.5 %
ERYTHROCYTE [DISTWIDTH] IN BLOOD BY AUTOMATED COUNT: 12.9 % (ref 10–15)
GFR SERPL CREATININE-BSD FRML MDRD: 41 ML/MIN/1.7M2
GLUCOSE SERPL-MCNC: 142 MG/DL (ref 70–99)
HCT VFR BLD AUTO: 39.7 % (ref 35–47)
HGB BLD-MCNC: 13.3 G/DL (ref 11.7–15.7)
IMM GRANULOCYTES # BLD: 0 10E9/L (ref 0–0.4)
IMM GRANULOCYTES NFR BLD: 0.2 %
LIPASE SERPL-CCNC: 924 U/L (ref 73–393)
LYMPHOCYTES # BLD AUTO: 1.9 10E9/L (ref 0.8–5.3)
LYMPHOCYTES NFR BLD AUTO: 17.3 %
MCH RBC QN AUTO: 27.5 PG (ref 26.5–33)
MCHC RBC AUTO-ENTMCNC: 33.5 G/DL (ref 31.5–36.5)
MCV RBC AUTO: 82 FL (ref 78–100)
MONOCYTES # BLD AUTO: 0.6 10E9/L (ref 0–1.3)
MONOCYTES NFR BLD AUTO: 5.5 %
NEUTROPHILS # BLD AUTO: 8.2 10E9/L (ref 1.6–8.3)
NEUTROPHILS NFR BLD AUTO: 75.3 %
NRBC # BLD AUTO: 0 10*3/UL
NRBC BLD AUTO-RTO: 0 /100
NT-PROBNP SERPL-MCNC: 159 PG/ML (ref 0–900)
PLATELET # BLD AUTO: 120 10E9/L (ref 150–450)
POTASSIUM SERPL-SCNC: 3.5 MMOL/L (ref 3.4–5.3)
PROT SERPL-MCNC: 7.1 G/DL (ref 6.8–8.8)
RBC # BLD AUTO: 4.84 10E12/L (ref 3.8–5.2)
SODIUM SERPL-SCNC: 142 MMOL/L (ref 133–144)
TROPONIN I SERPL-MCNC: NORMAL UG/L (ref 0–0.04)
WBC # BLD AUTO: 10.9 10E9/L (ref 4–11)

## 2017-08-07 PROCEDURE — 96374 THER/PROPH/DIAG INJ IV PUSH: CPT | Performed by: EMERGENCY MEDICINE

## 2017-08-07 PROCEDURE — 93010 ELECTROCARDIOGRAM REPORT: CPT | Mod: Z6 | Performed by: EMERGENCY MEDICINE

## 2017-08-07 PROCEDURE — 71020 XR CHEST 2 VW: CPT

## 2017-08-07 PROCEDURE — 76705 ECHO EXAM OF ABDOMEN: CPT

## 2017-08-07 PROCEDURE — 99285 EMERGENCY DEPT VISIT HI MDM: CPT | Mod: 25 | Performed by: EMERGENCY MEDICINE

## 2017-08-07 PROCEDURE — 83880 ASSAY OF NATRIURETIC PEPTIDE: CPT | Performed by: EMERGENCY MEDICINE

## 2017-08-07 PROCEDURE — 83690 ASSAY OF LIPASE: CPT | Performed by: EMERGENCY MEDICINE

## 2017-08-07 PROCEDURE — 80053 COMPREHEN METABOLIC PANEL: CPT | Performed by: EMERGENCY MEDICINE

## 2017-08-07 PROCEDURE — 96361 HYDRATE IV INFUSION ADD-ON: CPT | Performed by: EMERGENCY MEDICINE

## 2017-08-07 PROCEDURE — 85025 COMPLETE CBC W/AUTO DIFF WBC: CPT | Performed by: EMERGENCY MEDICINE

## 2017-08-07 PROCEDURE — 84484 ASSAY OF TROPONIN QUANT: CPT | Performed by: EMERGENCY MEDICINE

## 2017-08-07 PROCEDURE — 25000128 H RX IP 250 OP 636: Performed by: EMERGENCY MEDICINE

## 2017-08-07 PROCEDURE — 93005 ELECTROCARDIOGRAM TRACING: CPT | Performed by: EMERGENCY MEDICINE

## 2017-08-07 RX ORDER — ONDANSETRON 2 MG/ML
4 INJECTION INTRAMUSCULAR; INTRAVENOUS ONCE
Status: COMPLETED | OUTPATIENT
Start: 2017-08-07 | End: 2017-08-07

## 2017-08-07 RX ORDER — LIDOCAINE 40 MG/G
CREAM TOPICAL
Status: DISCONTINUED | OUTPATIENT
Start: 2017-08-07 | End: 2017-08-09

## 2017-08-07 RX ADMIN — ONDANSETRON 4 MG: 2 INJECTION INTRAMUSCULAR; INTRAVENOUS at 20:48

## 2017-08-07 RX ADMIN — SODIUM CHLORIDE, POTASSIUM CHLORIDE, SODIUM LACTATE AND CALCIUM CHLORIDE 1000 ML: 600; 310; 30; 20 INJECTION, SOLUTION INTRAVENOUS at 20:48

## 2017-08-07 ASSESSMENT — ENCOUNTER SYMPTOMS
DIARRHEA: 1
APPETITE CHANGE: 1
NAUSEA: 1
NERVOUS/ANXIOUS: 1
ABDOMINAL PAIN: 1
VOMITING: 0
CHILLS: 1

## 2017-08-07 NOTE — IP AVS SNAPSHOT
[address] [address]    Phone:  [phone]                                       After Visit Summary   8/7/2017    Mariel Ribeiro    MRN: [id number]           After Visit Summary Signature Page     I have received my discharge instructions, and my questions have been answered. I have discussed any challenges I see with this plan with the nurse or doctor.    ..........................................................................................................................................  Patient/Patient Representative Signature      ..........................................................................................................................................  Patient Representative Print Name and Relationship to Patient    ..................................................               ................................................  Date                                            Time    ..........................................................................................................................................  Reviewed by Signature/Title    ...................................................              ..............................................  Date                                                            Time
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
## 2017-08-07 NOTE — IP AVS SNAPSHOT
MRN:[id number]                      After Visit Summary   8/7/2017    Mariel Ribeiro    MRN: [id number]           Thank you!     Thank you for choosing [facility] for your care. Our goal is always to provide you with excellent care. Hearing back from our patients is one way we can continue to improve our services. Please take a few minutes to complete the written survey that you may receive in the mail after you visit with us. Thank you!        Patient Information     Date Of Birth          1945        Designated Caregiver       Most Recent Value    Caregiver    Will someone help with your care after discharge? no      About your hospital stay     You were admitted on:  August 8, 2017 You last received care in the:  [address] [facility] [place]    You were discharged on:  August 12, 2017        Reason for your hospital stay       Acute pancreatitis, unspecified complication status, unspecified pancreatitis type                  Who to Call     For medical emergencies, please call 911.  For non-urgent questions about your medical care, please call your primary care provider or clinic, [phone]  For questions related to your surgery, please call your surgery clinic        Attending Provider     Provider Specialty    Rosa Cadet MD Emergency Medicine    , MD [facility]    Joao Lundberg MD Pediatrics       Primary Care Provider Office Phone # Fax #    Rafaela William [phone]      After Care Instructions     Activity       Your activity upon discharge: activity as tolerated.   If you've recently had laparoscopic surgery, do not lift anything > 10 lbs for 2-3 weeks.  If you've recently had open surgery, do not lift anything > 10 lbs for 6-8 weeks.            Diet       Regular diet            Discharge Instructions       May start general diet immediately.  If you've had laparoscopic surgery, no lifting >10 lbs for 6-8 weeks.     No  rigorous physical activity. No other activity restrictions.  May shower. Allow soapy water to run over incision. Rinse with clean water. Never scrub your incision. Pat area dry.   No submersion in water (lake/pool/tub) for 2 weeks or until approved by your surgeon   Remove dressing in 48 hours. You have steri-strips or dermabond over your incisions that will off in 10-14 days.     No driving, no using heavy machinery and no major decision-making while taking narcotic pain medication. It is illegal to drive while taking narcotic pain medication. Narcotics can cause constipation. Take stool softener while taking narcotic pain medication. If no bowel movement for 2 days add a laxative to aid in bowel movement. You can obtain a laxative from your pharmacyst over the counter.     When taking narcotics take tylenol as scheduled. This will decrease the amount of narcotics required. When narcotics are no longer required for pain control, stop taking scheduled tylenol and take it as needed.    Please call if you experience increasing abdominal pain, nausea, vomiting, increasing drainage from your wounds, chills, or fever >101.5    Call [phone] for questions and to discuss appointment.     Call [phone] and ask to speak with surgery resident if you are having troubles in the evenings, at night, or on weekends.                  Follow-up Appointments     Adult [facility] Follow-up and recommended labs and tests       Follow up with Dr. Roberson , at [facility] Surgery clinic, as needed  Follow up with your PCP on Monday for evaluation of blood pressure.                  Your next 10 appointments already scheduled     Nov 15, 2017 11:30 AM CST   SHORT with Karen Hilton RPH   [facility] (Critical access hospital)    [address] [place] [address] [phone]              Additional Services     Medication Therapy Management Referral       Reason for referral:  on more than  "5 medications and managing chronic disease and on more than 10 medications and hospitalized or in the ED in the past 6 months     This service is designed to help you get the most from your medications.  A specially trained pharmacist will work closely with you and your doctors  to solve any problems related to your medications and to help you get the   best results from taking them.      The Medication Therapy Management staff will call you to schedule an appointment.                  Pending Results     Date and Time Order Name Status Description    8/11/2017 1405 Surgical pathology exam In process     8/8/2017 0446 Blood culture Preliminary     8/8/2017 0446 Blood culture Preliminary             Statement of Approval     Ordered          08/12/17 1214  I have reviewed and agree with all the recommendations and orders detailed in this document.  EFFECTIVE NOW     Approved and electronically signed by:  Zeeshan Zheng MD           08/12/17 2452  I have reviewed and agree with all the recommendations and orders detailed in this document.  EFFECTIVE NOW     Approved and electronically signed by:  Juan F Samuel MD             Admission Information     Date & Time Provider Department Dept. Phone    8/7/2017 Joao Lundberg MD [address] [facility] [phone]      Your Vitals Were     Blood Pressure Pulse Temperature Respirations Height Weight    172/52 (BP Location: Left arm) 70 97.8  F (36.6  C) (Oral) 16 1.689 m (5' 6.5\") 133.5 kg (294 lb 4.8 oz)    Pulse Oximetry BMI (Body Mass Index)                94% 46.79 kg/m2          MyChart Information     [facility] gives you secure access to your electronic health record. If you see a primary care provider, you can also send messages to your care team and make appointments. If you have questions, please call your primary care clinic.  If you do not have a primary care provider, please call [phone] and they will assist you.        Care EveryWhere ID     " This is your Care EveryWhere ID. This could be used by other organizations to access your [facility] medical records  [id number]        Equal Access to Services     KJ SALVADOR : Hadii von Martin, gemjanett baileybrodieha, hector taylor, gopal judahin hayaan mingogladys curry lamargadl bret Carcamo [facility] [phone].    ATENCIÓN: Si habla español, tiene a gillespie disposición servicios gratuitos de asistencia lingüística. Llame al [phone].    We comply with applicable federal civil rights laws and [place] laws. We do not discriminate on the basis of race, color, national origin, age, disability sex, sexual orientation or gender identity.               Review of your medicines      START taking        Dose / Directions    HYDROmorphone 2 MG tablet   Commonly known as:  DILAUDID        Dose:  2 mg   Take 1 tablet (2 mg) by mouth every 4 hours as needed for pain   Quantity:  20 tablet   Refills:  0         CONTINUE these medicines which have NOT CHANGED        Dose / Directions    albuterol 108 (90 BASE) MCG/ACT Inhaler   Commonly known as:  albuterol   Used for:  Chronic obstructive pulmonary disease with acute exacerbation (H)        INHALE 1 TO 2 PUFFS EVERY 4 TO 6 HOURS AS NEEDED   Quantity:  1 Inhaler   Refills:  PRN       aspirin  MG EC tablet        Dose:  325 mg   Take 1 tablet by mouth daily.   Quantity:  30 tablet   Refills:  0       atorvastatin 40 MG tablet   Commonly known as:  LIPITOR   Used for:  Hyperlipidemia with target LDL less than 70        TAKE 1 TABLET(40 MG) BY MOUTH DAILY   Quantity:  90 tablet   Refills:  3       blood glucose monitoring test strip   Commonly known as:  no brand specified        Dose:  1 strip   1 strip by In Vitro route 2 times daily Strips per patient's preference   Quantity:  200 each   Refills:  3       EPINEPHrine 0.3 MG/0.3ML injection 2-pack   Commonly known as:  EPIPEN/ADRENACLICK/or ANY BX GENERIC EQUIV   Used for:  Allergic reaction, subsequent encounter         Dose:  0.3 mg   Inject 0.3 mLs (0.3 mg) into the muscle once as needed for anaphylaxis   Quantity:  0.6 mL   Refills:  0       escitalopram 20 MG tablet   Commonly known as:  LEXAPRO   Used for:  Major depressive disorder, single episode, in partial remission (H)        Dose:  20 mg   Take 1 tablet (20 mg) by mouth every morning   Quantity:  90 tablet   Refills:  3       febuxostat 40 MG Tabs tablet   Commonly known as:  ULORIC   Used for:  Hyperuricemia        Dose:  40 mg   Take 1 tablet (40 mg) by mouth every evening   Quantity:  90 tablet   Refills:  3       folic acid 800 MCG Tabs        Dose:  800 mcg   Take 800 mcg by mouth daily.   Refills:  0       furosemide 40 MG tablet   Commonly known as:  LASIX   Used for:  Chronic diastolic heart failure (H)        Dose:  80 mg   Take 80 mg by mouth 2 times daily   Quantity:  120 tablet   Refills:  6       glimepiride 4 MG tablet   Commonly known as:  AMARYL   Used for:  Type 2 diabetes mellitus with chronic kidney disease, without long-term current use of insulin, unspecified CKD stage (H)        TAKE 1 TABLET BY MOUTH TWICE DAILY(WITH MEALS)   Quantity:  180 tablet   Refills:  0       GUAIFENESIN  MG Tbcr        Take 600 mg by mouth twice daily   Quantity:  30   Refills:  0       levothyroxine 175 MCG tablet   Commonly known as:  SYNTHROID/LEVOTHROID   Used for:  Hypothyroidism, unspecified type        TAKE 1 TABLET(175 MCG) BY MOUTH DAILY. REPEAT LABS IN 4-6 WEEKS   Quantity:  90 tablet   Refills:  0       LIFESCAN FINEPOINT LANCETS Misc   Used for:  Type 2 diabetes, HbA1C goal < 8% (H)        Use to test blood sugars 2 times daily or as directed.   Quantity:  100 each   Refills:  prn       metFORMIN 500 MG 24 hr tablet   Commonly known as:  GLUCOPHAGE-XR        Dose:  500 mg   Take 500 mg by mouth 2 times daily (with meals) Reported on 5/23/2017   Refills:  0       Miconazole Nitrate 2 % Powd   Used for:  Intertrigo        Apply to rash on chest three  times daily.  Continue for one week after the rash has resolved.   Quantity:  100 g   Refills:  3       niacin 500 MG CR tablet   Commonly known as:  NIASPAN   Used for:  Hyperlipidemia with target LDL less than 70        TAKE 1 TABLET(500 MG) BY MOUTH TWICE DAILY   Quantity:  180 tablet   Refills:  1       nitroGLYcerin 0.4 MG sublingual tablet   Commonly known as:  NITROSTAT   Used for:  Coronary atherosclerosis of unspecified type of vessel, native or graft        Dose:  0.4 mg   Place 1 tablet (0.4 mg) under the tongue every 5 minutes as needed for chest pain As needed for chest pain.   Quantity:  25 tablet   Refills:  PRN       ONE TOUCH ULTRA (DEVICES) Misc   Used for:  Type II or unspecified type diabetes mellitus without mention of complication, not stated as uncontrolled        test blood sugar BID   Quantity:  1   Refills:  0       POTASSIUM CHLORIDE PO        Dose:  10 mEq   Take 10 mEq by mouth 2 times daily   Refills:  0       pregabalin 100 MG capsule   Commonly known as:  LYRICA   Used for:  Myalgia        Dose:  100 mg   Take 1 capsule (100 mg) by mouth 2 times daily   Quantity:  180 capsule   Refills:  2       senna-docusate 8.6-50 MG per tablet   Commonly known as:  SENOKOT-S;PERICOLACE   Used for:  Constipation        Dose:  1-2 tablet   Take 1-2 tablets by mouth 2 times daily as needed for constipation   Quantity:  60 tablet   Refills:  0       sitagliptin 50 MG tablet   Commonly known as:  JANUVIA   Used for:  Type 2 diabetes mellitus with chronic kidney disease, without long-term current use of insulin, unspecified CKD stage (H)        Dose:  50 mg   Take 1 tablet (50 mg) by mouth daily   Quantity:  90 tablet   Refills:  3       TIZANIDINE HCL PO        Dose:  4 mg   Take 4 mg by mouth At Bedtime   Refills:  0       traZODone 50 MG tablet   Commonly known as:  DESYREL   Used for:  Insomnia, unspecified        Dose:  150 mg   Take 3 tablets (150 mg) by mouth At Bedtime   Quantity:  90 tablet    Refills:  7       vitamin D3 5000 UNIT/ML Liqd   Indication:  Liquid gel caps        Dose:  68623 Units   Take 10,000 Units by mouth daily   Refills:  0            Where to get your medicines      Some of these will need a paper prescription and others can be bought over the counter. Ask your nurse if you have questions.     Bring a paper prescription for each of these medications     HYDROmorphone 2 MG tablet                Protect others around you: Learn how to safely use, store and throw away your medicines at www.disposemymeds.org.             Medication List: This is a list of all your medications and when to take them. Check marks below indicate your daily home schedule. Keep this list as a reference.      Medications           Morning Afternoon Evening Bedtime As Needed    albuterol 108 (90 BASE) MCG/ACT Inhaler   Commonly known as:  albuterol   INHALE 1 TO 2 PUFFS EVERY 4 TO 6 HOURS AS NEEDED   Last time this was given:  2 puffs on 8/12/2017  8:25 AM                                aspirin  MG EC tablet   Take 1 tablet by mouth daily.                                atorvastatin 40 MG tablet   Commonly known as:  LIPITOR   TAKE 1 TABLET(40 MG) BY MOUTH DAILY   Last time this was given:  40 mg on 8/11/2017  7:54 PM                                blood glucose monitoring test strip   Commonly known as:  no brand specified   1 strip by In Vitro route 2 times daily Strips per patient's preference                                EPINEPHrine 0.3 MG/0.3ML injection 2-pack   Commonly known as:  EPIPEN/ADRENACLICK/or ANY BX GENERIC EQUIV   Inject 0.3 mLs (0.3 mg) into the muscle once as needed for anaphylaxis                                escitalopram 20 MG tablet   Commonly known as:  LEXAPRO   Take 1 tablet (20 mg) by mouth every morning   Last time this was given:  20 mg on 8/12/2017  8:26 AM                                febuxostat 40 MG Tabs tablet   Commonly known as:  ULORIC   Take 1 tablet (40 mg) by  mouth every evening   Last time this was given:  40 mg on 8/11/2017  7:54 PM                                folic acid 800 MCG Tabs   Take 800 mcg by mouth daily.                                furosemide 40 MG tablet   Commonly known as:  LASIX   Take 80 mg by mouth 2 times daily   Last time this was given:  80 mg on 8/12/2017  8:26 AM                                glimepiride 4 MG tablet   Commonly known as:  AMARYL   TAKE 1 TABLET BY MOUTH TWICE DAILY(WITH MEALS)   Last time this was given:  4 mg on 8/8/2017 12:58 AM                                GUAIFENESIN  MG Tbcr   Take 600 mg by mouth twice daily   Last time this was given:  600 mg on 8/12/2017  8:27 AM                                HYDROmorphone 2 MG tablet   Commonly known as:  DILAUDID   Take 1 tablet (2 mg) by mouth every 4 hours as needed for pain                                levothyroxine 175 MCG tablet   Commonly known as:  SYNTHROID/LEVOTHROID   TAKE 1 TABLET(175 MCG) BY MOUTH DAILY. REPEAT LABS IN 4-6 WEEKS   Last time this was given:  175 mcg on 8/12/2017  8:26 AM                                LIFESCAN FINEPOINT LANCETS Misc   Use to test blood sugars 2 times daily or as directed.                                metFORMIN 500 MG 24 hr tablet   Commonly known as:  GLUCOPHAGE-XR   Take 500 mg by mouth 2 times daily (with meals) Reported on 5/23/2017                                Miconazole Nitrate 2 % Powd   Apply to rash on chest three times daily.  Continue for one week after the rash has resolved.                                niacin 500 MG CR tablet   Commonly known as:  NIASPAN   TAKE 1 TABLET(500 MG) BY MOUTH TWICE DAILY                                nitroGLYcerin 0.4 MG sublingual tablet   Commonly known as:  NITROSTAT   Place 1 tablet (0.4 mg) under the tongue every 5 minutes as needed for chest pain As needed for chest pain.                                ONE TOUCH ULTRA (DEVICES) Misc   test blood sugar BID                                 POTASSIUM CHLORIDE PO   Take 10 mEq by mouth 2 times daily                                pregabalin 100 MG capsule   Commonly known as:  LYRICA   Take 1 capsule (100 mg) by mouth 2 times daily   Last time this was given:  100 mg on 8/12/2017  8:27 AM                                senna-docusate 8.6-50 MG per tablet   Commonly known as:  SENOKOT-S;PERICOLACE   Take 1-2 tablets by mouth 2 times daily as needed for constipation   Last time this was given:  2 tablets on 8/9/2017  4:55 PM                                sitagliptin 50 MG tablet   Commonly known as:  JANUVIA   Take 1 tablet (50 mg) by mouth daily                                TIZANIDINE HCL PO   Take 4 mg by mouth At Bedtime   Last time this was given:  4 mg on 8/11/2017 10:44 PM                                traZODone 50 MG tablet   Commonly known as:  DESYREL   Take 3 tablets (150 mg) by mouth At Bedtime   Last time this was given:  150 mg on 8/11/2017 10:23 PM                                vitamin D3 5000 UNIT/ML Liqd   Take 10,000 Units by mouth daily

## 2017-08-07 NOTE — TELEPHONE ENCOUNTER
Clinic Action Needed: Please have PCP Rafaela Escalante call Patient to advise .   FNA Triage Call  Presenting Problem:Tampering of morphine since march of 2017 up to 90mg a day , with help of Rafaela William MD at [facility].  Having Withdrawal  symptoms for the last 7 days of  bodyaches , water eyes , insomnia , sneezing &  nausea & abdominal pain .   Guideline Used:  Patient Recommendations/Teaching: Have someone drive Patient to [place] ED but Patient would like to consult by phone with PCP .   Routed to:Sent to PCP's nurse pool.   Radha Torres RN, [facility]         Reason for Disposition    [1] Constant abdominal pain AND [2] present > 2 hours    Additional Information    Negative: Coma (e.g., not moving, not talking, not responding to stimuli)    Negative: Difficult to awaken or acting confused  (e.g., disoriented, slurred speech)    Negative: Seeing, hearing, or feeling things that are not there (i.e., visual, auditory, or tactile hallucinations)    Negative: Slow, shallow and weak breathing    Negative: Seizure    Negative: Violent behavior or threatening to kill someone    Negative: Patient attempted suicide    Negative: Threatening suicide    Negative: Sounds like a life-threatening emergency to the triager    Negative: Recent significant injury, see that guideline (e.g., head, neck, chest, abdominal or extremity  injury)    Negative: Substance abuse or dependence: question or problem related to    Negative: Depression is main problem or symptom (e.g., feelings of sadness or hopelessness)    Negative: SEVERE abdominal pain    Protocols used: ALCOHOL ABUSE AND DEPENDENCE-ADULT-

## 2017-08-07 NOTE — TELEPHONE ENCOUNTER
Clinic Action Needed: Please have PCP Rafaela Escalante call Patient to advise with 2nd level triage  .   FNA Triage Call  Presenting Problem:Tampering of morphine since march of 2017 up to 90mg a day , with help of Rafaela William MD at [facility].  Having Withdrawal  symptoms for the last 7 days of  bodyaches , water eyes , insomnia , sneezing &  nausea & abdominal pain .   Patient Recommendations/Teaching: Have someone drive Patient to [place] ED but Patient would like to consult by phone with PCP .   Routed to:Sent to PCP's nurse pool.   Radha Torres RN, [facility]

## 2017-08-07 NOTE — TELEPHONE ENCOUNTER
Triaged this patient who reports her last narcotic dose was July 31 and she has been self tapering.    Now feels she is having withdrawal sx.(see her comments below) Did have some chest pain yesterday but not presently.     Needed some reassurance that it was OK to go to the ED and both FNA and clinic Triage RN agree YES.    Her PCP is out of office but would think it reasonable to go as well.      Plan:    To  get a ride from her roommate to the [facility] ED today.     Has a safety call button if  needed to call the paramedics.       Nurse encouraged her to go for an evaluation and then could be treated appropriately if she is having narcotic withdrawal. Has multiple health problems which complicate matters and need assessment .   Monica Gonzalez RN

## 2017-08-08 ENCOUNTER — ANESTHESIA EVENT (OUTPATIENT)
Dept: SURGERY | Facility: CLINIC | Age: 72
DRG: 417 | End: 2017-08-08
Payer: MEDICARE

## 2017-08-08 PROBLEM — K85.90 PANCREATITIS, ACUTE: Status: ACTIVE | Noted: 2017-08-08

## 2017-08-08 LAB
ALBUMIN SERPL-MCNC: 3.1 G/DL (ref 3.4–5)
ALBUMIN UR-MCNC: NEGATIVE MG/DL
ALP SERPL-CCNC: 88 U/L (ref 40–150)
ALT SERPL W P-5'-P-CCNC: 23 U/L (ref 0–50)
ANION GAP SERPL CALCULATED.3IONS-SCNC: 7 MMOL/L (ref 3–14)
APPEARANCE UR: CLEAR
AST SERPL W P-5'-P-CCNC: 18 U/L (ref 0–45)
BILIRUB DIRECT SERPL-MCNC: 0.1 MG/DL (ref 0–0.2)
BILIRUB SERPL-MCNC: 0.6 MG/DL (ref 0.2–1.3)
BILIRUB UR QL STRIP: NEGATIVE
BUN SERPL-MCNC: 23 MG/DL (ref 7–30)
CALCIUM SERPL-MCNC: 9.1 MG/DL (ref 8.5–10.1)
CHLORIDE SERPL-SCNC: 106 MMOL/L (ref 94–109)
CO2 SERPL-SCNC: 27 MMOL/L (ref 20–32)
COLOR UR AUTO: YELLOW
CREAT SERPL-MCNC: 1.24 MG/DL (ref 0.52–1.04)
ERYTHROCYTE [DISTWIDTH] IN BLOOD BY AUTOMATED COUNT: 13.6 % (ref 10–15)
GFR SERPL CREATININE-BSD FRML MDRD: 43 ML/MIN/1.7M2
GLUCOSE BLDC GLUCOMTR-MCNC: 113 MG/DL (ref 70–99)
GLUCOSE BLDC GLUCOMTR-MCNC: 121 MG/DL (ref 70–99)
GLUCOSE BLDC GLUCOMTR-MCNC: 129 MG/DL (ref 70–99)
GLUCOSE BLDC GLUCOMTR-MCNC: 80 MG/DL (ref 70–99)
GLUCOSE SERPL-MCNC: 113 MG/DL (ref 70–99)
GLUCOSE UR STRIP-MCNC: NEGATIVE MG/DL
HCT VFR BLD AUTO: 37.4 % (ref 35–47)
HGB BLD-MCNC: 12.4 G/DL (ref 11.7–15.7)
HGB UR QL STRIP: NEGATIVE
HYALINE CASTS #/AREA URNS LPF: 2 /LPF (ref 0–2)
INR PPP: 1.12 (ref 0.86–1.14)
INTERPRETATION ECG - MUSE: NORMAL
KETONES UR STRIP-MCNC: NEGATIVE MG/DL
LEUKOCYTE ESTERASE UR QL STRIP: ABNORMAL
MCH RBC QN AUTO: 27.1 PG (ref 26.5–33)
MCHC RBC AUTO-ENTMCNC: 33.2 G/DL (ref 31.5–36.5)
MCV RBC AUTO: 82 FL (ref 78–100)
MUCOUS THREADS #/AREA URNS LPF: PRESENT /LPF
NITRATE UR QL: NEGATIVE
PH UR STRIP: 5.5 PH (ref 5–7)
PLATELET # BLD AUTO: 106 10E9/L (ref 150–450)
POTASSIUM SERPL-SCNC: 3.4 MMOL/L (ref 3.4–5.3)
PROT SERPL-MCNC: 6.6 G/DL (ref 6.8–8.8)
RBC # BLD AUTO: 4.58 10E12/L (ref 3.8–5.2)
RBC #/AREA URNS AUTO: 0 /HPF (ref 0–2)
RENAL EPI CELLS #/AREA URNS HPF: <1 /HPF
SODIUM SERPL-SCNC: 140 MMOL/L (ref 133–144)
SP GR UR STRIP: 1.01 (ref 1–1.03)
TRANS CELLS #/AREA URNS HPF: <1 /HPF (ref 0–1)
URN SPEC COLLECT METH UR: ABNORMAL
UROBILINOGEN UR STRIP-MCNC: NORMAL MG/DL (ref 0–2)
WBC # BLD AUTO: 9.1 10E9/L (ref 4–11)
WBC #/AREA URNS AUTO: 13 /HPF (ref 0–2)

## 2017-08-08 PROCEDURE — 25000132 ZZH RX MED GY IP 250 OP 250 PS 637: Mod: GY | Performed by: STUDENT IN AN ORGANIZED HEALTH CARE EDUCATION/TRAINING PROGRAM

## 2017-08-08 PROCEDURE — 25000128 H RX IP 250 OP 636: Performed by: INTERNAL MEDICINE

## 2017-08-08 PROCEDURE — 00000146 ZZHCL STATISTIC GLUCOSE BY METER IP

## 2017-08-08 PROCEDURE — 85049 AUTOMATED PLATELET COUNT: CPT | Performed by: STUDENT IN AN ORGANIZED HEALTH CARE EDUCATION/TRAINING PROGRAM

## 2017-08-08 PROCEDURE — A9270 NON-COVERED ITEM OR SERVICE: HCPCS | Mod: GY | Performed by: STUDENT IN AN ORGANIZED HEALTH CARE EDUCATION/TRAINING PROGRAM

## 2017-08-08 PROCEDURE — A9270 NON-COVERED ITEM OR SERVICE: HCPCS | Mod: GY | Performed by: INTERNAL MEDICINE

## 2017-08-08 PROCEDURE — 25000128 H RX IP 250 OP 636: Performed by: EMERGENCY MEDICINE

## 2017-08-08 PROCEDURE — 99223 1ST HOSP IP/OBS HIGH 75: CPT | Mod: AI | Performed by: STUDENT IN AN ORGANIZED HEALTH CARE EDUCATION/TRAINING PROGRAM

## 2017-08-08 PROCEDURE — 80048 BASIC METABOLIC PNL TOTAL CA: CPT | Performed by: STUDENT IN AN ORGANIZED HEALTH CARE EDUCATION/TRAINING PROGRAM

## 2017-08-08 PROCEDURE — 85610 PROTHROMBIN TIME: CPT | Performed by: STUDENT IN AN ORGANIZED HEALTH CARE EDUCATION/TRAINING PROGRAM

## 2017-08-08 PROCEDURE — 25000132 ZZH RX MED GY IP 250 OP 250 PS 637: Mod: GY | Performed by: PHYSICIAN ASSISTANT

## 2017-08-08 PROCEDURE — A9270 NON-COVERED ITEM OR SERVICE: HCPCS | Mod: GY | Performed by: EMERGENCY MEDICINE

## 2017-08-08 PROCEDURE — 25000132 ZZH RX MED GY IP 250 OP 250 PS 637: Mod: GY | Performed by: EMERGENCY MEDICINE

## 2017-08-08 PROCEDURE — 25000132 ZZH RX MED GY IP 250 OP 250 PS 637: Mod: GY | Performed by: INTERNAL MEDICINE

## 2017-08-08 PROCEDURE — 40000141 ZZH STATISTIC PERIPHERAL IV START W/O US GUIDANCE

## 2017-08-08 PROCEDURE — 36415 COLL VENOUS BLD VENIPUNCTURE: CPT | Performed by: STUDENT IN AN ORGANIZED HEALTH CARE EDUCATION/TRAINING PROGRAM

## 2017-08-08 PROCEDURE — 81001 URINALYSIS AUTO W/SCOPE: CPT | Performed by: PHYSICIAN ASSISTANT

## 2017-08-08 PROCEDURE — 87086 URINE CULTURE/COLONY COUNT: CPT | Performed by: PHYSICIAN ASSISTANT

## 2017-08-08 PROCEDURE — 85027 COMPLETE CBC AUTOMATED: CPT | Performed by: STUDENT IN AN ORGANIZED HEALTH CARE EDUCATION/TRAINING PROGRAM

## 2017-08-08 PROCEDURE — A9270 NON-COVERED ITEM OR SERVICE: HCPCS | Mod: GY | Performed by: PHYSICIAN ASSISTANT

## 2017-08-08 PROCEDURE — 87040 BLOOD CULTURE FOR BACTERIA: CPT | Performed by: STUDENT IN AN ORGANIZED HEALTH CARE EDUCATION/TRAINING PROGRAM

## 2017-08-08 PROCEDURE — 80076 HEPATIC FUNCTION PANEL: CPT | Performed by: STUDENT IN AN ORGANIZED HEALTH CARE EDUCATION/TRAINING PROGRAM

## 2017-08-08 PROCEDURE — 12000001 ZZH R&B MED SURG/OB UMMC

## 2017-08-08 RX ORDER — ONDANSETRON 4 MG/1
4 TABLET, ORALLY DISINTEGRATING ORAL EVERY 6 HOURS PRN
Status: DISCONTINUED | OUTPATIENT
Start: 2017-08-08 | End: 2017-08-12 | Stop reason: HOSPADM

## 2017-08-08 RX ORDER — POTASSIUM CHLORIDE 1500 MG/1
20 TABLET, EXTENDED RELEASE ORAL ONCE
Status: COMPLETED | OUTPATIENT
Start: 2017-08-08 | End: 2017-08-08

## 2017-08-08 RX ORDER — MEDROXYPROGESTERONE ACETATE 10 MG
10 TABLET ORAL DAILY
Status: DISCONTINUED | OUTPATIENT
Start: 2017-08-08 | End: 2017-08-09 | Stop reason: CLARIF

## 2017-08-08 RX ORDER — DEXTROSE MONOHYDRATE 25 G/50ML
25-50 INJECTION, SOLUTION INTRAVENOUS
Status: DISCONTINUED | OUTPATIENT
Start: 2017-08-08 | End: 2017-08-08

## 2017-08-08 RX ORDER — SODIUM CHLORIDE, SODIUM LACTATE, POTASSIUM CHLORIDE, CALCIUM CHLORIDE 600; 310; 30; 20 MG/100ML; MG/100ML; MG/100ML; MG/100ML
1000 INJECTION, SOLUTION INTRAVENOUS ONCE
Status: COMPLETED | OUTPATIENT
Start: 2017-08-08 | End: 2017-08-08

## 2017-08-08 RX ORDER — ATORVASTATIN CALCIUM 40 MG/1
40 TABLET, FILM COATED ORAL DAILY
Status: DISCONTINUED | OUTPATIENT
Start: 2017-08-08 | End: 2017-08-12 | Stop reason: HOSPADM

## 2017-08-08 RX ORDER — ACETAMINOPHEN 325 MG/1
650 TABLET ORAL EVERY 4 HOURS PRN
Status: DISCONTINUED | OUTPATIENT
Start: 2017-08-08 | End: 2017-08-11

## 2017-08-08 RX ORDER — LIDOCAINE 40 MG/G
CREAM TOPICAL
Status: DISCONTINUED | OUTPATIENT
Start: 2017-08-08 | End: 2017-08-12 | Stop reason: HOSPADM

## 2017-08-08 RX ORDER — PREGABALIN 50 MG/1
100 CAPSULE ORAL 2 TIMES DAILY
Status: DISCONTINUED | OUTPATIENT
Start: 2017-08-08 | End: 2017-08-12 | Stop reason: HOSPADM

## 2017-08-08 RX ORDER — FEBUXOSTAT 40 MG/1
40 TABLET, FILM COATED ORAL EVERY EVENING
Status: DISCONTINUED | OUTPATIENT
Start: 2017-08-08 | End: 2017-08-12 | Stop reason: HOSPADM

## 2017-08-08 RX ORDER — PREGABALIN 100 MG/1
100 CAPSULE ORAL ONCE
Status: COMPLETED | OUTPATIENT
Start: 2017-08-08 | End: 2017-08-08

## 2017-08-08 RX ORDER — FUROSEMIDE 40 MG
40 TABLET ORAL ONCE
Status: COMPLETED | OUTPATIENT
Start: 2017-08-08 | End: 2017-08-08

## 2017-08-08 RX ORDER — ATORVASTATIN CALCIUM 40 MG/1
40 TABLET, FILM COATED ORAL ONCE
Status: COMPLETED | OUTPATIENT
Start: 2017-08-08 | End: 2017-08-08

## 2017-08-08 RX ORDER — GLIMEPIRIDE 4 MG/1
4 TABLET ORAL ONCE
Status: COMPLETED | OUTPATIENT
Start: 2017-08-08 | End: 2017-08-08

## 2017-08-08 RX ORDER — AMOXICILLIN 250 MG
1-2 CAPSULE ORAL 2 TIMES DAILY PRN
Status: DISCONTINUED | OUTPATIENT
Start: 2017-08-08 | End: 2017-08-12 | Stop reason: HOSPADM

## 2017-08-08 RX ORDER — SODIUM CHLORIDE, SODIUM LACTATE, POTASSIUM CHLORIDE, CALCIUM CHLORIDE 600; 310; 30; 20 MG/100ML; MG/100ML; MG/100ML; MG/100ML
INJECTION, SOLUTION INTRAVENOUS CONTINUOUS
Status: DISCONTINUED | OUTPATIENT
Start: 2017-08-08 | End: 2017-08-08

## 2017-08-08 RX ORDER — ALBUTEROL SULFATE 90 UG/1
1-2 AEROSOL, METERED RESPIRATORY (INHALATION) EVERY 4 HOURS PRN
Status: DISCONTINUED | OUTPATIENT
Start: 2017-08-08 | End: 2017-08-12 | Stop reason: HOSPADM

## 2017-08-08 RX ORDER — ESCITALOPRAM OXALATE 20 MG/1
20 TABLET ORAL EVERY MORNING
Status: DISCONTINUED | OUTPATIENT
Start: 2017-08-08 | End: 2017-08-12 | Stop reason: HOSPADM

## 2017-08-08 RX ORDER — NICOTINE POLACRILEX 4 MG
15-30 LOZENGE BUCCAL
Status: DISCONTINUED | OUTPATIENT
Start: 2017-08-08 | End: 2017-08-08

## 2017-08-08 RX ORDER — POLYETHYLENE GLYCOL 3350 17 G/17G
17 POWDER, FOR SOLUTION ORAL DAILY
Status: DISCONTINUED | OUTPATIENT
Start: 2017-08-08 | End: 2017-08-12 | Stop reason: HOSPADM

## 2017-08-08 RX ORDER — NALOXONE HYDROCHLORIDE 0.4 MG/ML
.1-.4 INJECTION, SOLUTION INTRAMUSCULAR; INTRAVENOUS; SUBCUTANEOUS
Status: DISCONTINUED | OUTPATIENT
Start: 2017-08-08 | End: 2017-08-11

## 2017-08-08 RX ORDER — GUAIFENESIN 600 MG/1
600 TABLET, EXTENDED RELEASE ORAL ONCE
Status: COMPLETED | OUTPATIENT
Start: 2017-08-08 | End: 2017-08-08

## 2017-08-08 RX ORDER — ONDANSETRON 2 MG/ML
4 INJECTION INTRAMUSCULAR; INTRAVENOUS EVERY 6 HOURS PRN
Status: DISCONTINUED | OUTPATIENT
Start: 2017-08-08 | End: 2017-08-12 | Stop reason: HOSPADM

## 2017-08-08 RX ORDER — TRAZODONE HYDROCHLORIDE 150 MG/1
150 TABLET ORAL ONCE
Status: COMPLETED | OUTPATIENT
Start: 2017-08-08 | End: 2017-08-08

## 2017-08-08 RX ORDER — NICOTINE POLACRILEX 4 MG
15-30 LOZENGE BUCCAL
Status: DISCONTINUED | OUTPATIENT
Start: 2017-08-08 | End: 2017-08-12 | Stop reason: HOSPADM

## 2017-08-08 RX ORDER — DEXTROSE MONOHYDRATE 25 G/50ML
25-50 INJECTION, SOLUTION INTRAVENOUS
Status: DISCONTINUED | OUTPATIENT
Start: 2017-08-08 | End: 2017-08-12 | Stop reason: HOSPADM

## 2017-08-08 RX ORDER — FEBUXOSTAT 40 MG/1
40 TABLET, FILM COATED ORAL ONCE
Status: COMPLETED | OUTPATIENT
Start: 2017-08-08 | End: 2017-08-08

## 2017-08-08 RX ORDER — GUAIFENESIN 600 MG/1
600 TABLET, EXTENDED RELEASE ORAL 2 TIMES DAILY
Status: DISCONTINUED | OUTPATIENT
Start: 2017-08-08 | End: 2017-08-12 | Stop reason: HOSPADM

## 2017-08-08 RX ORDER — NIACINAMIDE 500 MG
500 TABLET ORAL ONCE
Status: COMPLETED | OUTPATIENT
Start: 2017-08-08 | End: 2017-08-08

## 2017-08-08 RX ADMIN — ACETAMINOPHEN 650 MG: 325 TABLET, FILM COATED ORAL at 10:54

## 2017-08-08 RX ADMIN — SODIUM CHLORIDE, POTASSIUM CHLORIDE, SODIUM LACTATE AND CALCIUM CHLORIDE 1000 ML: 600; 310; 30; 20 INJECTION, SOLUTION INTRAVENOUS at 14:18

## 2017-08-08 RX ADMIN — SODIUM CHLORIDE, POTASSIUM CHLORIDE, SODIUM LACTATE AND CALCIUM CHLORIDE: 600; 310; 30; 20 INJECTION, SOLUTION INTRAVENOUS at 01:38

## 2017-08-08 RX ADMIN — GLIMEPIRIDE 4 MG: 4 TABLET ORAL at 00:58

## 2017-08-08 RX ADMIN — GUAIFENESIN 600 MG: 600 TABLET, EXTENDED RELEASE ORAL at 10:54

## 2017-08-08 RX ADMIN — SENNOSIDES AND DOCUSATE SODIUM 1 TABLET: 8.6; 5 TABLET ORAL at 08:22

## 2017-08-08 RX ADMIN — POLYETHYLENE GLYCOL 3350 17 G: 17 POWDER, FOR SOLUTION ORAL at 19:50

## 2017-08-08 RX ADMIN — ATORVASTATIN CALCIUM 40 MG: 40 TABLET, FILM COATED ORAL at 19:49

## 2017-08-08 RX ADMIN — GUAIFENESIN 600 MG: 600 TABLET, EXTENDED RELEASE ORAL at 00:58

## 2017-08-08 RX ADMIN — SODIUM CHLORIDE, POTASSIUM CHLORIDE, SODIUM LACTATE AND CALCIUM CHLORIDE 1000 ML: 600; 310; 30; 20 INJECTION, SOLUTION INTRAVENOUS at 06:30

## 2017-08-08 RX ADMIN — ENOXAPARIN SODIUM 40 MG: 40 INJECTION SUBCUTANEOUS at 14:25

## 2017-08-08 RX ADMIN — Medication 500 MG: at 00:58

## 2017-08-08 RX ADMIN — FEBUXOSTAT 40 MG: 40 TABLET ORAL at 00:58

## 2017-08-08 RX ADMIN — GUAIFENESIN 600 MG: 600 TABLET, EXTENDED RELEASE ORAL at 19:49

## 2017-08-08 RX ADMIN — PREGABALIN 100 MG: 50 CAPSULE ORAL at 19:49

## 2017-08-08 RX ADMIN — ACETAMINOPHEN 650 MG: 325 TABLET, FILM COATED ORAL at 20:00

## 2017-08-08 RX ADMIN — ESCITALOPRAM OXALATE 20 MG: 20 TABLET ORAL at 08:11

## 2017-08-08 RX ADMIN — ATORVASTATIN CALCIUM 40 MG: 40 TABLET, FILM COATED ORAL at 00:57

## 2017-08-08 RX ADMIN — LEVOTHYROXINE SODIUM 175 MCG: 25 TABLET ORAL at 06:34

## 2017-08-08 RX ADMIN — ALBUTEROL SULFATE 2 PUFF: 90 AEROSOL, METERED RESPIRATORY (INHALATION) at 10:53

## 2017-08-08 RX ADMIN — FUROSEMIDE 40 MG: 40 TABLET ORAL at 00:58

## 2017-08-08 RX ADMIN — TRAZODONE HYDROCHLORIDE 150 MG: 100 TABLET ORAL at 22:04

## 2017-08-08 RX ADMIN — FEBUXOSTAT 40 MG: 40 TABLET ORAL at 22:04

## 2017-08-08 RX ADMIN — PREGABALIN 100 MG: 100 CAPSULE ORAL at 00:57

## 2017-08-08 RX ADMIN — TRAZODONE HYDROCHLORIDE 150 MG: 150 TABLET ORAL at 01:09

## 2017-08-08 RX ADMIN — POTASSIUM CHLORIDE 20 MEQ: 1500 TABLET, EXTENDED RELEASE ORAL at 00:59

## 2017-08-08 RX ADMIN — TIZANIDINE 4 MG: 4 TABLET ORAL at 00:59

## 2017-08-08 RX ADMIN — PREGABALIN 100 MG: 50 CAPSULE ORAL at 08:11

## 2017-08-08 RX ADMIN — SENNOSIDES AND DOCUSATE SODIUM 1 TABLET: 8.6; 5 TABLET ORAL at 17:00

## 2017-08-08 ASSESSMENT — ACTIVITIES OF DAILY LIVING (ADL)
TRANSFERRING: 1-->ASSISTIVE EQUIPMENT
FALL_HISTORY_WITHIN_LAST_SIX_MONTHS: NO
AMBULATION: 1-->ASSISTIVE EQUIPMENT
TOILETING: 0-->INDEPENDENT
RETIRED_COMMUNICATION: 0-->UNDERSTANDS/COMMUNICATES WITHOUT DIFFICULTY
DRESS: 0-->INDEPENDENT
COGNITION: 0 - NO COGNITION ISSUES REPORTED
SWALLOWING: 0-->SWALLOWS FOODS/LIQUIDS WITHOUT DIFFICULTY
BATHING: 2-->ASSISTIVE PERSON
RETIRED_EATING: 0-->INDEPENDENT

## 2017-08-08 ASSESSMENT — COPD QUESTIONNAIRES
CAT_SEVERITY: MILD
COPD: 1
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
## 2017-08-08 NOTE — H&P
[facility], [place]    Internal Medicine History and Physical - Gold Service       Date of Admission:  8/7/2017    Assessment & Plan   Mariel Ribeiro is a 72 year old female  admitted on 8/7/2017. She has a history of pancreatitis, diastolic CHF (last EF 50-55% in 2010), COPD and family history of thrombosis and is admitted for acute pancreatitis.     # Acute Pancreatitis.  # Subcentimeter pancreatitic cysts.  Lipase elevated to 900s on admission. Discharged 5/19/17 for similar presentation notable for cholelithiasis and CBD dilation and normal lipase.  Pt recommended to f/u with general surgery as outpt clinic (elizabeth for 8/10).  Pain self-resolved per discharge summary.  Cysts noted incidentally on US in ED, recommended to have outpatient imaging follow-up per Dr. Almodovar. RUQ US on admission with cholelithiasis and CBD dilation.   -LR bolus in ED followed by LR @125mL/hr x1L for dehydration/poor PO intake.   -NO narcotics for pain per pt request. She has self-weaned off narcotics recently.   -Consider MRCP in AM.   -Consider GI consult based on imaging results.     # Thrombocytopenia. Chronic.  Stable at 120s.    -Monitor CBC.     # History of opiate dependence, in remission.  Pt self-tapered over past 2 weeks and went through withdrawal.  Pt initially thought her presenting symptoms were related to opiate withdrawal, but they persisted beyond 5-6 days anticipated based on her internet reading.  She now does NOT want opiates for pain control.    -NO OPIATES PER PATIENT REQUEST.     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  SQ and oral medications only.    -FSG Q4H while NPO.   -Medium SSI aspart    Chronic problems:  # Chronic diastolic heart failure.  Last known EF 50-55%.  Gentle use of IVF.   -hold home lasix due to hypovolemia.     # Hypothyroidism. Continue PTA synthroid.     # Constipation. Continue PTA bowel regimen.     # Chronic neuropathic pain 2/2  uncontrolled DM2.   -Continue lyrica 100mg PO BID.     # COPD. Continue PTA ellipta/albuterol.     Diet: NPO for Medical/Clinical Reasons Except for: Meds, Ice Chips  Fluids: LR @125mL/hr x1L then stop.   DVT Prophylaxis: Ambulate every shift  Code Status: Full Code    Disposition Plan   Expected discharge: 2 - 3 days; recommended to prior living arrangement once pain controlled and tolerating PO intake.     Entered: Kevin Moon 08/08/2017, 5:20 AM   Information in the above section will display in the discharge planner report.    The patient's care was to be formally discussed with staff in the morning.     Kevin Moon MD  PGY-3,   Internal Medicine [facility] Resident Team.   [facility]  Pager: [phone]  Please see sticky note for cross cover information  ______________________________________________________________________    Chief Complaint   Epigastric Pain    History is obtained from the patient    History of Present Illness   Mariel Ribeiro is a 72 year old female  admitted on 8/7/2017. She has a history of pancreatitis, diastolic CHF (last EF 50-55% in 2010), COPD and family history of thrombosis and is admitted for acute pancreatitis.     Regarding her abdominal pain history, she was admitted here in 5/2017 and had self limited RUQ pain at that time. At that time, she had cholelithiasis and CBD dilation without gallbladder thickening or pericholecystic fluid. Plan at discharge was to follow up with general surgery as well as outpatient MRCP, but these were not completed.    She presents now with epigastric abdominal pain radiating to her back.  Pain is 8/10 and constant.  Felt much better with administration of zofran. Complains of nausea, loose stools, anorexia and chills.  Story is muddied by recent opiate withdrawal over past week.  Patient also has been trying to self taper her oral morphine. Her last dose was 7/29; used to be at 90 mg daily, which she cut down  to 15 mg every other day.    Pt self-tapered and had very typical symptoms of opiate withdrawal including chills, gooseflesh skin, nausea, diarrhea and body aches.  Symptoms lasted greater than the expected 5-6 days per the internet and epigastric pain worsened since remaining opiate withdrawal symptoms resolved.  States that she also had some chest pain one day ago, not relieved by nitro. No shortness of breath. Diarrhea has resolved and now constipated x3 days without bowel movement.     LE numbness and tingling at baseline.  Denies other complaints at this time.      In the ED, noted to have lipase in 900s with hepatic labs WNL.  RUQ US with cholelithiasis and CBD dilation.  Given 1L LR in ED and zofran as she declined narcotics and admitted to medicine for further evaluation and management.      Review of Systems   The 10 point Review of Systems is negative other than noted in the HPI or here.     Past Medical History    CAD, ARIANNA to LAd in 2011  COPD  CKD Stage III  Diastolic heart failure  Depression  Fibromyalgia  Chronic lymphedema  Migraines  GERD  Gout  Hx of Thyroid Cancer  Myalgia and myositis  Hx of BELEN  HLD  Hx of Renal failure in 2009  Type 2 Diabetes  HTN  Hypothyroidism  History of acute cholelithiasis, admitted in 5/2017 for this    Past Surgical History   Knee Right Arthoplasty  D&c  Corneal surgery  Thyroidectomy  Right breast biopsy, benigh  Rotator cuff surgery    Social History   Lives with roommate who is POA.   Former smoker, 27 py hx, quit in 1989  Alcohol-none currently.     Family History   I have reviewed this patient's family history and updated it with pertinent information if needed.   Family History   Problem Relation Age of Onset     C.A.D. Mother      DIABETES Mother      Hypertension Mother      Blood Disease Mother      multiple episodes of thrombosis     Circulatory Mother      DVT X 2; ocular clot; cerebral; carotid artery stenosis     C.A.D. Father      Hypertension Father       CEREBROVASCULAR DISEASE Father      Alcohol/Drug Father      etoh     CANCER Brother      liver,pancreas, brain     Cardiovascular Sister      Hypertension Sister      Hypertension Brother      Alcohol/Drug Sister      etoh     Alcohol/Drug Brother      drug     DIABETES Sister      younger     C.A.D. Sister      CABG age 65     C.A.D. Brother      CABG age 42     C.A.D. Sister      stents age 58     C.A.D. Brother      Genitourinary Problems Sister      kidney disease     Prior to Admission Medications   Prior to Admission Medications   Prescriptions Last Dose Informant Patient Reported? Taking?   Cholecalciferol (VITAMIN D3) 5000 UNIT/ML LIQD   Yes No   Sig: Take 10,000 Units by mouth daily    EPINEPHrine 0.3 MG/0.3ML injection   No No   Sig: Inject 0.3 mLs (0.3 mg) into the muscle once as needed for anaphylaxis   Folic Acid 800 MCG TABS   Yes No   Sig: Take 800 mcg by mouth daily.   GUAIFENESIN  MG OR TBCR   Yes No   Sig: Take 600 mg by mouth twice daily   The Kitchen Hotline FINEPOINT LANCETS MISC   No No   Sig: Use to test blood sugars 2 times daily or as directed.   LOTRIMIN AF JOCK ITCH POWDER 2 % AERP powder   No No   Sig: APPLY TO RASH ON CHEST THREE TIMES DAILY CONTINUE FOR 7 DAYS AFTER THE RASH HAS RESOLVED   Miconazole Nitrate 2 % POWD   No No   Sig: Apply to rash on chest three times daily.  Continue for one week after the rash has resolved.   ONE TOUCH ULTRA (DEVICES) MISC   No No   Sig: test blood sugar BID   albuterol (ALBUTEROL) 108 (90 BASE) MCG/ACT Inhaler   No No   Sig: INHALE 1 TO 2 PUFFS EVERY 4 TO 6 HOURS AS NEEDED   aspirin  MG tablet   Yes No   Sig: Take 1 tablet by mouth daily.   atorvastatin (LIPITOR) 40 MG tablet   No No   Sig: TAKE 1 TABLET(40 MG) BY MOUTH DAILY   blood glucose monitoring (NO BRAND SPECIFIED) test strip   No No   Si strip by In Vitro route 2 times daily Strips per patient's preference   doxycycline (VIBRAMYCIN) 100 MG capsule   No No   Sig: Take 1 capsule (100 mg)  by mouth 2 times daily   escitalopram (LEXAPRO) 20 MG tablet   No No   Sig: Take 1 tablet (20 mg) by mouth every morning   febuxostat (ULORIC) 40 MG TABS tablet   No No   Sig: Take 1 tablet (40 mg) by mouth every evening   fluticasone (FLOVENT DISKUS) 100 MCG/BLIST AEPB   No No   Sig: Inhale 1 puff into the lungs every 12 hours   furosemide (LASIX) 40 MG tablet   Yes No   Sig: Take 1.5 tablets (60 mg) by mouth 2 times daily   glimepiride (AMARYL) 4 MG tablet   No No   Sig: TAKE 1 TABLET BY MOUTH TWICE DAILY(WITH MEALS)   levothyroxine (SYNTHROID/LEVOTHROID) 175 MCG tablet   No No   Sig: TAKE 1 TABLET(175 MCG) BY MOUTH DAILY. REPEAT LABS IN 4-6 WEEKS   lidocaine (LIDODERM) 5 % Patch   No No   Sig: Apply up to 3 patches to painful area at once for up to 12 h within a 24 h period.  Remove after 12 hours.   medroxyPROGESTERone (PROVERA) 10 MG tablet   No No   Sig: Take 1 tablet (10 mg) by mouth daily   metFORMIN (GLUCOPHAGE-XR) 500 MG 24 hr tablet   No No   Si tabs after breakfast and supper   metFORMIN (GLUCOPHAGE-XR) 500 MG 24 hr tablet   Yes No   Sig: Take 500 mg by mouth 2 times daily (with meals) Reported on 2017   methylPREDNISolone (MEDROL DOSEPAK) 4 MG tablet   No No   Sig: Follow package instructions   niacin (NIASPAN) 500 MG CR tablet   No No   Sig: TAKE 1 TABLET(500 MG) BY MOUTH TWICE DAILY   niacin (NIASPAN) 500 MG CR tablet   No No   Sig: TAKE 1 TABLET(500 MG) BY MOUTH TWICE DAILY   nitroglycerin (NITROSTAT) 0.4 MG SL tablet   No No   Sig: Place 1 tablet (0.4 mg) under the tongue every 5 minutes as needed for chest pain As needed for chest pain.   polyethylene glycol (MIRALAX) powder   No No   Sig: Take 17 grams by mouth once to twice daily.   potassium chloride SA (K-DUR,KLOR-CON M) 10 MEQ tablet   No No   Sig: Take 2 tablets (20 mEq) by mouth 2 times daily   pregabalin (LYRICA) 100 MG capsule   No No   Sig: Take 1 capsule (100 mg) by mouth 2 times daily   senna-docusate (SENOKOT-S;PERICOLACE)  8.6-50 MG per tablet   No No   Sig: Take 1-2 tablets by mouth 2 times daily as needed for constipation   sitagliptin (JANUVIA) 50 MG tablet   No No   Sig: Take 1 tablet (50 mg) by mouth daily   tiZANidine (ZANAFLEX) 4 MG tablet   No No   Sig: TAKE 1 TABLET BY MOUTH DAILY AS NEEDED.   traZODone (DESYREL) 50 MG tablet   No No   Sig: Take 3 tablets (150 mg) by mouth At Bedtime      Facility-Administered Medications: None     Allergies   Allergies   Allergen Reactions     Contrast Dye Anaphylaxis     Fish Allergy Anaphylaxis     Iodine Anaphylaxis     Oxycodone Other (See Comments)     Severe suicidal tendencies on this medication     Tree Nuts [Nuts] Anaphylaxis     Tree nuts only (peanuts ok)     Albuterol Other (See Comments)     Sandrita-oral erythema     Bactrim      Increased uric acid     Betadine [Povidone Iodine] Hives, Swelling and Difficulty breathing     Betadine     Combivent      Rash     Dulaglutide Other (See Comments)     Hx . Of thyroid cancer.     Penicillins Rash     Allopurinol Rash     Latex Rash     Wool Fiber Rash       Physical Exam   Vital Signs: Temp: 96.8  F (36  C) Temp src: Axillary BP: 143/71 Pulse: 83 Heart Rate: 78 Resp: 16 SpO2: 92 % O2 Device: None (Room air)    Weight: 0 lbs 0 oz    General Appearance: Lying in bed, NAD.   Eyes: Wearing dark glasses, PERRL, no icterus.   HEENT: dry MM, OP clear, upper dentures in place.   Respiratory: reduced air movement bilaterally, no wheezing, rales or rhonchi.   Cardiovascular: RRR, no m/r/g, 2+ radial/DP pulses bilaterally.   GI: soft, tender epigastrium/LLQ to deep palpation, normal BS, no guarding, no rebound.   Lymph/Hematologic: No cervical/supraclavicular LAD.   Skin: No rashes, ecchymoses or open wounds noted on examined areas of skin.   Musculoskeletal: Normal bulk and tone.   Neurologic: Alert/oriented to name, location and date.  CN II-XII grossly intact. Freely moving all 4 extremities.  Normal speech.   Psychiatric: Linear thought  process. Flattened affect.     Data   Data reviewed today: I reviewed all medications, new labs and imaging results over the last 24 hours. I personally reviewed no images or EKG's today.    Data     Recent Labs  Lab 08/07/17  1737   WBC 10.9   HGB 13.3   MCV 82   *      POTASSIUM 3.5   CHLORIDE 103   CO2 26   BUN 25   CR 1.28*   ANIONGAP 13   ANTOINETTE 9.1   *   ALBUMIN 3.4   PROTTOTAL 7.1   BILITOTAL 0.4   ALKPHOS 112   ALT 25   AST 23   LIPASE 924*   TROPI <0.015The 99th percentile for upper reference range is 0.045 ug/L.  Troponin values in the range of 0.045 - 0.120 ug/L may be associated with risks of adverse clinical events.

## 2017-08-08 NOTE — PHARMACY-ADMISSION MEDICATION HISTORY
Admission medication history interview status for the 8/7/2017 admission is complete. See Epic admission navigator for allergy information, pharmacy, prior to admission medications and immunization status.     Medication history interview sources:  patient interview, Ver[employer] Benefits    Changes made to PTA medication list (reason)  Added: Potassium 10 mEq  Deleted: Doxycycline (course ended in March), lidocaine 5% patch (patient stopped taking), Lotrimin (duplicate), medroxyprogesterone (patient stopped taking), metformin (duplicate), methylprednisolone (finished course), niacin (duplicate), Flovent (patient stopped taking)  Changed: tizanidine PRN -> scheduled, furosemide 60mg BID -> 80mg BID, metformin XR 2 tablets twice daily -> 1 tablet twice daily    Additional medication history information (including reliability of information, actions taken by pharmacist):  - Patient reported taking 2 tablets of potassium 10 mEq twice daily; however, recent fill was for 20 mEq tablets. Unsure what she is taking.  - Patient is not taking Flovent anymore - didn't know if she was taking correctly  - Patient requests brand name Synthroid  - Patient reported taking 1 tablet metformin XR twice daily, but had prescriptions written for both 1 tablet twice daily and 2 tablets twice daily  - Takes tizanidine every night instead of as needed   - Reviewed allergies with patient  - Patient reported taking all morning medications yesterday, but not evening medications.    Prior to Admission medications    Medication Sig Last Dose Taking? Auth Provider   POTASSIUM CHLORIDE PO Take 10 mEq by mouth 2 times daily 8/7/2017 at AM Yes Unknown, Entered By History   TIZANIDINE HCL PO Take 4 mg by mouth At Bedtime 8/7/2017 at PM Yes Unknown, Entered By History   glimepiride (AMARYL) 4 MG tablet TAKE 1 TABLET BY MOUTH TWICE DAILY(WITH MEALS) 8/7/2017 at AM Yes Jc Yao MD   Miconazole Nitrate 2 % POWD Apply to rash on chest  three times daily.  Continue for one week after the rash has resolved.  Yes Rafaela William MD   niacin (NIASPAN) 500 MG CR tablet TAKE 1 TABLET(500 MG) BY MOUTH TWICE DAILY 8/7/2017 at AM Yes Rafaela William MD   levothyroxine (SYNTHROID/LEVOTHROID) 175 MCG tablet TAKE 1 TABLET(175 MCG) BY MOUTH DAILY. REPEAT LABS IN 4-6 WEEKS 8/7/2017 at AM Yes Rafaela William MD   furosemide (LASIX) 40 MG tablet Take 80 mg by mouth 2 times daily  8/7/2017 at AM Yes Rafaela William MD   metFORMIN (GLUCOPHAGE-XR) 500 MG 24 hr tablet Take 500 mg by mouth 2 times daily (with meals) Reported on 5/23/2017 8/7/2017 at AM Yes Reported, Patient   atorvastatin (LIPITOR) 40 MG tablet TAKE 1 TABLET(40 MG) BY MOUTH DAILY 8/6/2017 at PM Yes Rafaela William MD   sitagliptin (JANUVIA) 50 MG tablet Take 1 tablet (50 mg) by mouth daily 8/7/2017 at AM Yes Rafaela William MD   pregabalin (LYRICA) 100 MG capsule Take 1 capsule (100 mg) by mouth 2 times daily 8/7/2017 at AM Yes Rafaela William MD   albuterol (ALBUTEROL) 108 (90 BASE) MCG/ACT Inhaler INHALE 1 TO 2 PUFFS EVERY 4 TO 6 HOURS AS NEEDED  at PRN Yes Rafaela William MD   febuxostat (ULORIC) 40 MG TABS tablet Take 1 tablet (40 mg) by mouth every evening 8/6/2017 at PM Yes Rafaela William MD   traZODone (DESYREL) 50 MG tablet Take 3 tablets (150 mg) by mouth At Bedtime 8/6/2017 at PM Yes Rafaela William MD   EPINEPHrine 0.3 MG/0.3ML injection Inject 0.3 mLs (0.3 mg) into the muscle once as needed for anaphylaxis  at PRN Yes Rafaela William MD   escitalopram (LEXAPRO) 20 MG tablet Take 1 tablet (20 mg) by mouth every morning 8/7/2017 at AM Yes Rafaela William MD   nitroglycerin (NITROSTAT) 0.4 MG SL tablet Place 1 tablet (0.4 mg) under the tongue every 5 minutes as needed for chest pain As needed for chest pain.  at PRN Yes Rosina Blount NP   senna-docusate (SENOKOT-S;PERICOLACE) 8.6-50 MG per tablet Take 1-2 tablets by mouth 2 times daily as  needed for constipation  at PRN Yes Meet Pan MD   aspirin  MG tablet Take 1 tablet by mouth daily. 8/7/2017 at AM Yes Dakota Shankar MD   Folic Acid 800 MCG TABS Take 800 mcg by mouth daily. 8/7/2017 at AM Yes Unknown, Entered By History   Cholecalciferol (VITAMIN D3) 5000 UNIT/ML LIQD Take 10,000 Units by mouth daily  Past Week Yes Reported, Patient   GUAIFENESIN  MG OR TBCR Take 600 mg by mouth twice daily 8/7/2017 at AM Yes Rosina Blount NP   blood glucose monitoring (NO BRAND SPECIFIED) test strip 1 strip by In Vitro route 2 times daily Strips per patient's preference   Rafaela William MD   [employer] Digital[employer] FINEPOINT LANCETS MISC Use to test blood sugars 2 times daily or as directed.   Rosina Blount NP   ONE TOUCH ULTRA (DEVICES) MISC test blood sugar BID   Rosina Blount NP         Medication history completed by: Lanette Ricketts, PharmD Student

## 2017-08-08 NOTE — PROGRESS NOTES
Pt arrived to unit around 0345 from [place] [facility]. Pt arrived via stretcher and ambulated to bed with Ax1 and cane. Admission process completed. Pt oriented to room, call light, and unit. VSS on RA. A&O. Able to make needs known. Will implement POC when available.
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
## 2017-08-08 NOTE — CONSULTS
Note placed to resolve consult order.  Please see note from Dr. Olsen & Dr. Maravilla from today's date for full details surrounding consult.    Ayo Wolff  Gastroenterology Fellow

## 2017-08-08 NOTE — PLAN OF CARE
Problem: Goal Outcome Summary  Goal: Goal Outcome Summary  Outcome: No Change  Pt A&Ox4, VSS on RA. C/o generalized arthritic pain, gave PRN Tylenol x1 with some relief. Pt was NPO for possible MRCP today, diet ordered changed to CC diet, pt tolerating intake. LR @ 125 ml/hr through L PIV. Up independently in room and to bathroom. No BM. BGs 129 and 113.  Per notes, plan for possible lap choley tomorrow. Will continue to monitor and follow POC.

## 2017-08-08 NOTE — PLAN OF CARE
Problem: Goal Outcome Summary  Goal: Goal Outcome Summary  Outcome: No Change  Pt arrived to unit around 0345. Presenting with Morphine withdrawal symptoms. A&O, VSS on RA. C/O pain but declined interventions and was able to sleep the rest of the morning. Up with Ax1 and a cane. R PIV patent and infusing IVMF's. Tolerating NPO diet. Voiding spontaneously. Continue to monitor and follow POC.
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
## 2017-08-09 ENCOUNTER — APPOINTMENT (OUTPATIENT)
Dept: PHYSICAL THERAPY | Facility: CLINIC | Age: 72
DRG: 417 | End: 2017-08-09
Attending: STUDENT IN AN ORGANIZED HEALTH CARE EDUCATION/TRAINING PROGRAM
Payer: MEDICARE

## 2017-08-09 ENCOUNTER — APPOINTMENT (OUTPATIENT)
Dept: OCCUPATIONAL THERAPY | Facility: CLINIC | Age: 72
DRG: 417 | End: 2017-08-09
Attending: STUDENT IN AN ORGANIZED HEALTH CARE EDUCATION/TRAINING PROGRAM
Payer: MEDICARE

## 2017-08-09 LAB
ALBUMIN UR-MCNC: NEGATIVE MG/DL
AMYLASE SERPL-CCNC: 136 U/L (ref 30–110)
APPEARANCE UR: CLEAR
BACTERIA SPEC CULT: NO GROWTH
BILIRUB UR QL STRIP: NEGATIVE
COLOR UR AUTO: ABNORMAL
GLUCOSE BLDC GLUCOMTR-MCNC: 112 MG/DL (ref 70–99)
GLUCOSE BLDC GLUCOMTR-MCNC: 120 MG/DL (ref 70–99)
GLUCOSE BLDC GLUCOMTR-MCNC: 159 MG/DL (ref 70–99)
GLUCOSE BLDC GLUCOMTR-MCNC: 172 MG/DL (ref 70–99)
GLUCOSE BLDC GLUCOMTR-MCNC: 55 MG/DL (ref 70–99)
GLUCOSE BLDC GLUCOMTR-MCNC: 63 MG/DL (ref 70–99)
GLUCOSE BLDC GLUCOMTR-MCNC: 65 MG/DL (ref 70–99)
GLUCOSE BLDC GLUCOMTR-MCNC: 91 MG/DL (ref 70–99)
GLUCOSE UR STRIP-MCNC: NEGATIVE MG/DL
HBA1C MFR BLD: 7.3 % (ref 4.3–6)
HGB UR QL STRIP: NEGATIVE
HYALINE CASTS #/AREA URNS LPF: 12 /LPF (ref 0–2)
KETONES UR STRIP-MCNC: NEGATIVE MG/DL
LEUKOCYTE ESTERASE UR QL STRIP: ABNORMAL
Lab: NORMAL
MICRO REPORT STATUS: NORMAL
NITRATE UR QL: NEGATIVE
PH UR STRIP: 5.5 PH (ref 5–7)
RBC #/AREA URNS AUTO: 2 /HPF (ref 0–2)
RENAL EPI CELLS #/AREA URNS HPF: <1 /HPF
SP GR UR STRIP: 1 (ref 1–1.03)
SPECIMEN SOURCE: NORMAL
SQUAMOUS #/AREA URNS AUTO: <1 /HPF (ref 0–1)
TRANS CELLS #/AREA URNS HPF: <1 /HPF (ref 0–1)
URN SPEC COLLECT METH UR: ABNORMAL
UROBILINOGEN UR STRIP-MCNC: NORMAL MG/DL (ref 0–2)
WBC #/AREA URNS AUTO: 29 /HPF (ref 0–2)

## 2017-08-09 PROCEDURE — 25000132 ZZH RX MED GY IP 250 OP 250 PS 637: Mod: GY | Performed by: STUDENT IN AN ORGANIZED HEALTH CARE EDUCATION/TRAINING PROGRAM

## 2017-08-09 PROCEDURE — A9270 NON-COVERED ITEM OR SERVICE: HCPCS | Mod: GY | Performed by: STUDENT IN AN ORGANIZED HEALTH CARE EDUCATION/TRAINING PROGRAM

## 2017-08-09 PROCEDURE — 25000132 ZZH RX MED GY IP 250 OP 250 PS 637: Mod: GY | Performed by: INTERNAL MEDICINE

## 2017-08-09 PROCEDURE — 97162 PT EVAL MOD COMPLEX 30 MIN: CPT | Mod: GP | Performed by: REHABILITATION PRACTITIONER

## 2017-08-09 PROCEDURE — 40000133 ZZH STATISTIC OT WARD VISIT: Performed by: OCCUPATIONAL THERAPIST

## 2017-08-09 PROCEDURE — 12000001 ZZH R&B MED SURG/OB UMMC

## 2017-08-09 PROCEDURE — 97535 SELF CARE MNGMENT TRAINING: CPT | Mod: GO | Performed by: OCCUPATIONAL THERAPIST

## 2017-08-09 PROCEDURE — 40000193 ZZH STATISTIC PT WARD VISIT: Performed by: REHABILITATION PRACTITIONER

## 2017-08-09 PROCEDURE — 97110 THERAPEUTIC EXERCISES: CPT | Mod: GP | Performed by: REHABILITATION PRACTITIONER

## 2017-08-09 PROCEDURE — 36415 COLL VENOUS BLD VENIPUNCTURE: CPT | Performed by: INTERNAL MEDICINE

## 2017-08-09 PROCEDURE — A9270 NON-COVERED ITEM OR SERVICE: HCPCS | Mod: GY | Performed by: INTERNAL MEDICINE

## 2017-08-09 PROCEDURE — 25000132 ZZH RX MED GY IP 250 OP 250 PS 637: Mod: GY | Performed by: PHYSICIAN ASSISTANT

## 2017-08-09 PROCEDURE — 97530 THERAPEUTIC ACTIVITIES: CPT | Mod: GO | Performed by: OCCUPATIONAL THERAPIST

## 2017-08-09 PROCEDURE — 97165 OT EVAL LOW COMPLEX 30 MIN: CPT | Mod: GO | Performed by: OCCUPATIONAL THERAPIST

## 2017-08-09 PROCEDURE — A9270 NON-COVERED ITEM OR SERVICE: HCPCS | Mod: GY | Performed by: PHYSICIAN ASSISTANT

## 2017-08-09 PROCEDURE — 00000146 ZZHCL STATISTIC GLUCOSE BY METER IP

## 2017-08-09 PROCEDURE — 82150 ASSAY OF AMYLASE: CPT | Performed by: INTERNAL MEDICINE

## 2017-08-09 PROCEDURE — 97116 GAIT TRAINING THERAPY: CPT | Mod: GP | Performed by: REHABILITATION PRACTITIONER

## 2017-08-09 PROCEDURE — 81001 URINALYSIS AUTO W/SCOPE: CPT | Performed by: STUDENT IN AN ORGANIZED HEALTH CARE EDUCATION/TRAINING PROGRAM

## 2017-08-09 PROCEDURE — 99232 SBSQ HOSP IP/OBS MODERATE 35: CPT | Mod: GC | Performed by: STUDENT IN AN ORGANIZED HEALTH CARE EDUCATION/TRAINING PROGRAM

## 2017-08-09 PROCEDURE — 25000128 H RX IP 250 OP 636: Performed by: INTERNAL MEDICINE

## 2017-08-09 PROCEDURE — 83036 HEMOGLOBIN GLYCOSYLATED A1C: CPT | Performed by: INTERNAL MEDICINE

## 2017-08-09 PROCEDURE — 87086 URINE CULTURE/COLONY COUNT: CPT | Performed by: INTERNAL MEDICINE

## 2017-08-09 RX ORDER — FUROSEMIDE 40 MG
80 TABLET ORAL 2 TIMES DAILY
Status: DISCONTINUED | OUTPATIENT
Start: 2017-08-09 | End: 2017-08-12 | Stop reason: HOSPADM

## 2017-08-09 RX ORDER — AMOXICILLIN 250 MG
2 CAPSULE ORAL ONCE
Status: COMPLETED | OUTPATIENT
Start: 2017-08-09 | End: 2017-08-09

## 2017-08-09 RX ORDER — CEFTRIAXONE 1 G/1
1 INJECTION, POWDER, FOR SOLUTION INTRAMUSCULAR; INTRAVENOUS EVERY 24 HOURS
Status: DISCONTINUED | OUTPATIENT
Start: 2017-08-09 | End: 2017-08-11

## 2017-08-09 RX ORDER — POTASSIUM CHLORIDE 750 MG/1
20 TABLET, EXTENDED RELEASE ORAL 2 TIMES DAILY
Status: DISCONTINUED | OUTPATIENT
Start: 2017-08-09 | End: 2017-08-12 | Stop reason: HOSPADM

## 2017-08-09 RX ADMIN — GUAIFENESIN 600 MG: 600 TABLET, EXTENDED RELEASE ORAL at 08:25

## 2017-08-09 RX ADMIN — ACETAMINOPHEN 650 MG: 325 TABLET, FILM COATED ORAL at 22:47

## 2017-08-09 RX ADMIN — PREGABALIN 100 MG: 50 CAPSULE ORAL at 20:59

## 2017-08-09 RX ADMIN — FEBUXOSTAT 40 MG: 40 TABLET ORAL at 20:59

## 2017-08-09 RX ADMIN — FUROSEMIDE 80 MG: 40 TABLET ORAL at 16:54

## 2017-08-09 RX ADMIN — TIZANIDINE 4 MG: 4 TABLET ORAL at 21:03

## 2017-08-09 RX ADMIN — POLYETHYLENE GLYCOL 3350 17 G: 17 POWDER, FOR SOLUTION ORAL at 08:25

## 2017-08-09 RX ADMIN — ATORVASTATIN CALCIUM 40 MG: 40 TABLET, FILM COATED ORAL at 20:58

## 2017-08-09 RX ADMIN — ESCITALOPRAM OXALATE 20 MG: 20 TABLET ORAL at 08:25

## 2017-08-09 RX ADMIN — LEVOTHYROXINE SODIUM 175 MCG: 25 TABLET ORAL at 07:36

## 2017-08-09 RX ADMIN — ACETAMINOPHEN 650 MG: 325 TABLET, FILM COATED ORAL at 17:16

## 2017-08-09 RX ADMIN — SENNOSIDES AND DOCUSATE SODIUM 2 TABLET: 8.6; 5 TABLET ORAL at 16:55

## 2017-08-09 RX ADMIN — CEFTRIAXONE 1 G: 1 INJECTION, POWDER, FOR SOLUTION INTRAMUSCULAR; INTRAVENOUS at 16:54

## 2017-08-09 RX ADMIN — POTASSIUM CHLORIDE 20 MEQ: 750 TABLET, EXTENDED RELEASE ORAL at 20:58

## 2017-08-09 RX ADMIN — GUAIFENESIN 600 MG: 600 TABLET, EXTENDED RELEASE ORAL at 20:59

## 2017-08-09 RX ADMIN — ACETAMINOPHEN 650 MG: 325 TABLET, FILM COATED ORAL at 08:25

## 2017-08-09 RX ADMIN — FUROSEMIDE 60 MG: 20 TABLET ORAL at 08:25

## 2017-08-09 RX ADMIN — TRAZODONE HYDROCHLORIDE 150 MG: 100 TABLET ORAL at 21:03

## 2017-08-09 RX ADMIN — PREGABALIN 100 MG: 50 CAPSULE ORAL at 08:24

## 2017-08-09 ASSESSMENT — ACTIVITIES OF DAILY LIVING (ADL): PREVIOUS_RESPONSIBILITIES: MEDICATION MANAGEMENT;FINANCES

## 2017-08-09 NOTE — PLAN OF CARE
Problem: Goal Outcome Summary  Goal: Goal Outcome Summary     Patient remains A&O, pleasant and up SBA with cane denies pain or nausea tolerating CHO diet. Plan for lap jass on Friday 8/11. Awaiting PT/OT consults to be completed and for order for lymphedema to see (MD paged). BG stable during the day from low episode overnight- refusing Novolog sliding scale (reporting d/t doesn't take insulin at home, education attempted- Dr. Galaviz pageinderjit to recommend diabetes educator consult d/t also high A1C).

## 2017-08-09 NOTE — PROGRESS NOTES
08/09/17 1000   Quick Adds   Type of Visit Initial Occupational Therapy Evaluation   Living Environment   Lives With other (see comments)  (roommate Odalis and michele Shanks)   Living Arrangements house   Home Accessibility stairs within home;stairs to enter home;grab bars present (bathtub)   Number of Stairs to Enter Home 2   Number of Stairs Within Home (1 flight to basement, does not need to access)   Stair Railings at Home inside, present at both sides   Transportation Available car;family or friend will provide   Self-Care   Dominant Hand right   Usual Activity Tolerance fair   Current Activity Tolerance fair   Regular Exercise yes   Activity/Exercise Type other (see comments)  (HEP from outpatient PT for balance)   Exercise Amount/Frequency daily   Equipment Currently Used at Home bath bench;grab bar;cane, straight;walker, rolling   Activity/Exercise/Self-Care Comment Patient reports independent with dressing and toileting, increased time to complete. Roommate completes cooking and cleaning and shopping.   Functional Level Prior   Ambulation 1-->assistive equipment   Transferring 1-->assistive equipment   Toileting 1-->assistive equipment   Bathing 1-->assistive equipment   Dressing 0-->independent   Eating 0-->independent   Communication 0-->understands/communicates without difficulty   Swallowing 0-->swallows foods/liquids without difficulty   Cognition 1 - attention or memory deficits  (due to concussion from fall November of 2016)   Fall history within last six months no   Which of the above functional risks had a recent onset or change? ambulation;transferring;dressing;toileting;bathing   Prior Functional Level Comment Patient reports independent with dressing and toileting, increased time to complete. Roommate completes cooking and cleaning and shopping.       Present no   Language English   General Information   Onset of Illness/Injury or Date of Surgery - Date 08/07/17   Referring  "Physician Joao Lundberg MD   Patient/Family Goals Statement \"DC home after surgery\"   Additional Occupational Profile Info/Pertinent History of Current Problem 72 year old female with a complex medical history including DMII, hypothyroidism, insomnia, myalgia and myostitis, coronary atherosclerosis (ARIANNA to LAD 7/2011) gout, kidney failure (2009), migraines, thyroid cancer, numbness and tingling in hands and feet, and lymphedema of lower extremity among others who presents to the ED with morphine withdrawal symptoms.   Precautions/Limitations fall precautions   Weight-Bearing Status - LUE weight-bearing as tolerated   Weight-Bearing Status - RUE weight-bearing as tolerated   Weight-Bearing Status - LLE weight-bearing as tolerated   Weight-Bearing Status - RLE weight-bearing as tolerated   Heart Disease Risk Factors Family history;Stress;Overweight;Lack of physical activity;Medical history   General Observations Pleasant and cooperative with therapy   Cognitive Status Examination   Orientation orientation to person, place and time   Level of Consciousness alert   Able to Follow Commands WNL/WFL   Personal Safety (Cognitive) WNL/WFL   Cognitive Comment Patient reports concussion from fall in November of 2016   Visual Perception   Visual Perception No deficits were identified;Wears glasses   Pain Assessment   Patient Currently in Pain Yes, see Vital Sign flowsheet   Range of Motion (ROM)   ROM Comment L UE ROM impaired at shoulder due to rotator cuff surgery, not full ROM   Strength   Strength Comments B UE strength WFL for daily activities of daily living, L UE Strength decreased    Hand Strength   Left hand  (pounds) (WFL)   Right hand  (pounds) (WFL)   Coordination   Gross Motor Coordination WFL   Fine Motor Coordination WFL   Mobility   Bed Mobility Comments SBA with bed mobility   Transfer Skill: Bed to Chair/Chair to Bed   Level of [place]: Bed to Chair stand-by assist   Physical " "Assist/Nonphysical Assist: Bed to Chair set-up required;supervision;verbal cues   Weight-Bearing Restrictions weight-bearing as tolerated   Transfer Skill: Sit to Stand   Level of [place]: Sit/Stand stand-by assist   Physical Assist/Nonphysical Assist: Sit/Stand set-up required;supervision;verbal cues   Transfer Skill: Sit to Stand weight-bearing as tolerated   Assistive Device for Transfer: Sit/Stand straight cane   Transfer Skill: Toilet Transfer   Level of [place]: Toilet minimum assist (75% patients effort)   Physical Assist/Nonphysical Assist: Toilet set-up required;supervision;verbal cues   Weight-Bearing Restrictions: Toilet weight-bearing as tolerated   Assistive Device grab bars;straight cane   Balance   Balance Comments Decreased balance, patient reports she gets dizzy and has \"passed out\" before if she doesn't move slowly when getting up   Upper Body Dressing   Level of [place]: Dress Upper Body stand-by assist   Physical Assist/Nonphysical Assist: Dress Upper Body set-up required   Lower Body Dressing   Level of [place]: Dress Lower Body minimum assist (75% patients effort)   Physical Assist/Nonphysical Assist: Dress Lower Body set-up required;supervision;verbal cues;1 person assist   Toileting   Level of [place]: Toilet independent   Grooming   Level of [place]: Grooming stand-by assist   Physical Assist/Nonphysical Assist: Grooming set-up required   Eating/Self Feeding   Level of [place]: Eating independent   Instrumental Activities of Daily Living (IADL)   Previous Responsibilities medication management;finances   Activities of Daily Living Analysis   Impairments Contributing to Impaired Activities of Daily Living balance impaired;pain;strength decreased;ROM decreased   General Therapy Interventions   Planned Therapy Interventions ADL retraining;transfer training;progressive activity/exercise;home program guidelines;strengthening   Clinical Impression   Criteria for " Skilled Therapeutic Interventions Met yes, treatment indicated   OT Diagnosis Impaired ADL's and overall activity tolerance due to significant past medical history and new onset of pain   Influenced by the following impairments pain, decreased activity tolerance   Assessment of Occupational Performance 1-3 Performance Deficits   Identified Performance Deficits ADL's, functional transfers and IADL's   Clinical Decision Making (Complexity) Low complexity   Therapy Frequency 5 times/wk   Predicted Duration of Therapy Intervention (days/wks) 1 week   Anticipated Discharge Disposition Home with Assist   Risks and Benefits of Treatment have been explained. Yes   Patient, Family & other staff in agreement with plan of care Yes   Total Evaluation Time   Total Evaluation Time (Minutes) 10

## 2017-08-09 NOTE — PLAN OF CARE
Problem: Goal Outcome Summary  Goal: Goal Outcome Summary  Outcome: No Change  A&Ox4, VSS on RA. Up SBA with cane. Consistent Carb diet. BGs with meals and bedtime. R PIV SL. Large bruise on L upper arm from lovenox shot, ice pack helped relieve pain. Pt c/o generalized pain, partially relieved with tylenol.  No BM today, PRN senna given this evening along with scheduled miralax. Pt c/o dysuria starting last night, UA sent. Will continue to monitor and follow POC.

## 2017-08-09 NOTE — PLAN OF CARE
Problem: Goal Outcome Summary  Goal: Goal Outcome Summary  Outcome: Therapy, progress toward functional goals as expected  OT: Evaluation completed, treatment initiated. Patient completed bed mobility SBA, functional transfers SBA-April with cane and LE dressing April. Pain and decreased functional endurance limiting overall independence with ADL's/IADL's. Planning for surgery (cholecystectomy) on Friday 8/11.   Plan: OT to continue 5x a week for functional endurance and safety for safe discharge home with roommate following surgery. Will continue.

## 2017-08-09 NOTE — PROGRESS NOTES
08/09/17 1334   Quick Adds   Type of Visit Initial PT Evaluation   Living Environment   Lives With other (see comments)   Living Arrangements house   Home Accessibility stairs within home;stairs to enter home;grab bars present (bathtub)   Number of Stairs to Enter Home 2   Number of Stairs Within Home (stairs to basement, but pt reports never using)   Stair Railings at Home inside, present at both sides   Transportation Available family or friend will provide   Living Environment Comment Pt has been living with roommate for 30 years, has a dog, roommate still works so pt will be home alone during day. Pt notes she has many friends that are available to help out as needed.   Self-Care   Dominant Hand right   Usual Activity Tolerance fair   Current Activity Tolerance fair   Regular Exercise yes   Activity/Exercise Type other (see comments)  (HEP from previous PT)   Exercise Amount/Frequency daily   Equipment Currently Used at Home cane, straight   Functional Level Prior   Ambulation 1-->assistive equipment   Transferring 1-->assistive equipment   Toileting 1-->assistive equipment   Bathing 1-->assistive equipment   Dressing 0-->independent   Eating 0-->independent   Communication 0-->understands/communicates without difficulty   Swallowing 0-->swallows foods/liquids without difficulty   Cognition 1 - attention or memory deficits   Fall history within last six months no   Which of the above functional risks had a recent onset or change? ambulation;transferring;dressing;toileting;bathing   Prior Functional Level Comment Pt reports a fall over 6 months ago that resulted in concussion   General Information   Onset of Illness/Injury or Date of Surgery - Date 08/07/17   Referring Physician Joao Lundberg MD   Patient/Family Goals Statement to return home   Pertinent History of Current Problem (include personal factors and/or comorbidities that impact the POC) 72 year old female with a history of chronic  cholelithiasis is admitted with RUQ pain, nausea. Patient has a known hx of gall stones, her history and imaging corroborate that. She was supposed to have an outpatient elective cholecystectomy but due to her weight issues, she was told to decrease her weight to less than 300 lbs. Patient was supposed to have an appointment with surgery on 8/10 to discuss possible cholecystectomy in the future but was admitted with this episode of RUQ pain. This pain seem to be caused by her gall stones. No immediate concern for cholangitis. Patient currently afebrile, LFTs normal. She does have an elevated lipase but with normal alk phos and bili (total and direct), less likely this is related to gallstone pancreatitis. There is a possibility that there could have been a stone in the CBD but which could have passed. Her US did show some common hepatic duct dilation. No stones visualized per US. Surgery has also been consulted who are planning to take the patient for a cholecystectomy on 8/9.    Precautions/Limitations fall precautions   Cognitive Status Examination   Orientation orientation to person, place and time   Level of Consciousness alert   Follows Commands and Answers Questions 100% of the time   Personal Safety and Judgment intact   Memory intact   Pain Assessment   Patient Currently in Pain Yes, see Vital Sign flowsheet   Integumentary/Edema   Integumentary/Edema Comments B LE edema, pt notes this is baseline and she self-manages edema at home following instructions from previous PT   Posture    Posture Forward head position;Protracted shoulders   Range of Motion (ROM)   ROM Comment B LE WFL   Strength   Strength Comments 4/5 BLE   Bed Mobility   Bed Mobility Comments Not assessed, pt in chair during session and ind with transfers. Reports independence with all bed mobility   Transfer Skills   Transfer Comments Ind sit<>stand   Gait   Gait Comments SBA-CGA with straight cane   Balance   Balance Comments Seated balance  "WFL, standing requires UE support    Sensory Examination   Sensory Perception Comments Not assessed, pt reports no issues   General Therapy Interventions   Planned Therapy Interventions balance training;gait training;strengthening;home program guidelines;progressive activity/exercise   Clinical Impression   Criteria for Skilled Therapeutic Intervention yes, treatment indicated   PT Diagnosis impaired functional mobility   Influenced by the following impairments decreased strength, endurance, balance, activity tolerance   Functional limitations due to impairments CGA for gait   Clinical Presentation Evolving/Changing   Clinical Presentation Rationale PMH and clinical judgment   Clinical Decision Making (Complexity) Moderate complexity   Therapy Frequency` 5 times/week   Predicted Duration of Therapy Intervention (days/wks) 8/16/17   Anticipated Discharge Disposition Home with Home Therapy   Risk & Benefits of therapy have been explained Yes   Patient, Family & other staff in agreement with plan of care Yes   [facility] [employer] \"6 Clicks\"   2016, Trustees of [facility], under license to [employer].  All rights reserved.   6 Clicks Short Forms Basic Mobility Inpatient Short Form   [facility] AM-PAC  \"6 Clicks\" V.2 Basic Mobility Inpatient Short Form   1. Turning from your back to your side while in a flat bed without using bedrails? 4 - None   2. Moving from lying on your back to sitting on the side of a flat bed without using bedrails? 4 - None   3. Moving to and from a bed to a chair (including a wheelchair)? 4 - None   4. Standing up from a chair using your arms (e.g., wheelchair, or bedside chair)? 3 - A Little   5. To walk in hospital room? 3 - A Little   6. Climbing 3-5 steps with a railing? 2 - A Lot   Basic Mobility Raw Score (Score out of 24.Lower scores equate to lower levels of function) 20   Total Evaluation Time   Total Evaluation Time (Minutes) 8     "

## 2017-08-09 NOTE — PROGRESS NOTES
[facility], [place]    Internal Medicine Progress Note    Main Plans for Today   -Continue PT/OT  -Awaiting surgery Friday    Assessment & Plan   Mariel Ribeiro is a 72 year old female  admitted on 8/7/2017. She has a history of pancreatitis, diastolic CHF (last EF 50-55% in 2010), COPD and family history of thrombosis and is admitted for acute pancreatitis, likely due to gallstone disease.  Planned for lap jass with IOC 8/11 with Dr. Roberson.    # Acute Pancreatitis, suspect gallstone pancreatitis, improved  # Subcentimeter pancreatitic cysts  Much improved as of 8/9.  Currently planned for lap jass with IOC 8/11 with Dr. Roberson.    -Trending amylase at request of Dr. Roberson; <3x ULN as of 8/9  -Advance diet as tolerated  -Appropriate fluids received on admission; currently tolerating PO so will not continue  -Avoid narcotics as below  -GI, surgery following; appreciate assistance from both  -Preop eval per H&P; currently medically optimized through remains at above-average risk.  --Plan lovenox postoperatively  --Early ambulation and plan for aggressive pulmonary hygiene  --Careful fluid management perioperatively     # History of opiate dependence, recently in remission.    Pt self-tapered in 2 weeks PTA from 90 mg morphine daily down to 0.  Per patient request, avoiding narcotics as possible.  -Anesthesia consult for periop pain management plan  -Minimize narcotics as much as possible     # UTI  -Ceftriaxone 1g q24h x3d (8/9 - 8/11)    # LUE hematoma  Noted at site of lovenox injections.  No necrosis and timing not right for KIMBERLY.  Suspect local reaction only.  Continue to monitor.  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  Now normalized; continue to monitor.  -Medium SSI aspart while inpatient  -FSQ QID  -Home: metformin + amaryl + januvia    #  Chronic diastolic heart failure.  Last known EF 50-55%.   -Resume Lasix 8/9; watch fluid status closely  -OT consult for LE wraps    Chronic problems:    # Thrombocytopenia. Chronic.  Stable at 120s.    -Monitor CBC intermittently      # Hypothyroidism-  synthroid      # Constipation - miralax daily, senna prn     # Chronic neuropathic pain 2/2 DM2- Lyrica, tizanadine     # COPD- PTA ellipta/albuterol.     Diet: Consistent Carbohydrate Diet 2220-0245 Calories: Moderate Consistent CHO (4-6 CHO units/meal)  Fluids: PO ad vaughn  DVT Prophylaxis: Enoxaparin (Lovenox) SQ; ambulating frequently  Code Status: Full Code    Disposition Plan   Expected discharge: 4 - 7 days, recommended to prior living arrangement following planned lap jass 8/11 and appropriate recovery.     Entered: Jus Reynolds 08/09/2017, 1:48 PM   Information in the above section will display in the discharge planner report.      The patient's care was discussed with the Attending Physician, Dr. Lundberg.    Jus Reynolds MD, PhD  PGY-2, Internal Medicine  Pager: [phone]      Interval History   Hypoglycemic overnight with nae 55; asymptomatic at time, improved with PO juice.  Also developed some dysuria yesterday that feels like her typical UTIs.  Today, abdominal pain much improved and tolerating PO intake with minimal pain.  No chest pain, dyspnea, f/s/c.  No BMs since admission.  Was able to work with PT today.    Physical Exam   Vital Signs: Temp: 97  F (36.1  C) Temp src: Oral BP: 158/61 Pulse: 72 Heart Rate: 71 Resp: 18 SpO2: 96 % O2 Device: None (Room air)    Weight: 295 lbs 8 oz  General Appearance: Well-appearing, sitting up in chair.    Respiratory: CTAB, nwob, on RA  Cardiovascular: RRR, no m/r/g  Abd: s/nt/nd, bs+.    Neuro: A&O, EOMI, ambulates with cane with minimal difficulty  Skin: Large ecchymosis and small amount of fluctuance left upper arm.  No warmth or surrounding erythema.  C/s/m intact distally.

## 2017-08-09 NOTE — PLAN OF CARE
Problem: Goal Outcome Summary  Goal: Goal Outcome Summary  Outcome: Therapy, progress toward functional goals as expected  5A PT- Eval completed and treatment initiated. Pt ind with all bed mobility and transfers, CGA-SBA for gait 75ft with SEC. Pt refused further ambulation due to fatigue. Primary limitation is decreased activity tolerance however also limited by strength and balance. Recommend discharge to home with occasional assist and HHPT. Stairs Must be assessed before discharge home.

## 2017-08-10 ENCOUNTER — APPOINTMENT (OUTPATIENT)
Dept: OCCUPATIONAL THERAPY | Facility: CLINIC | Age: 72
DRG: 417 | End: 2017-08-10
Payer: MEDICARE

## 2017-08-10 ENCOUNTER — APPOINTMENT (OUTPATIENT)
Dept: PHYSICAL THERAPY | Facility: CLINIC | Age: 72
DRG: 417 | End: 2017-08-10
Payer: MEDICARE

## 2017-08-10 LAB
ALBUMIN SERPL-MCNC: 3 G/DL (ref 3.4–5)
ALP SERPL-CCNC: 92 U/L (ref 40–150)
ALT SERPL W P-5'-P-CCNC: 22 U/L (ref 0–50)
AMYLASE SERPL-CCNC: 135 U/L (ref 30–110)
ANION GAP SERPL CALCULATED.3IONS-SCNC: 8 MMOL/L (ref 3–14)
AST SERPL W P-5'-P-CCNC: 15 U/L (ref 0–45)
BACTERIA SPEC CULT: NO GROWTH
BILIRUB DIRECT SERPL-MCNC: 0.1 MG/DL (ref 0–0.2)
BILIRUB SERPL-MCNC: 0.4 MG/DL (ref 0.2–1.3)
BUN SERPL-MCNC: 20 MG/DL (ref 7–30)
CALCIUM SERPL-MCNC: 9.3 MG/DL (ref 8.5–10.1)
CHLORIDE SERPL-SCNC: 106 MMOL/L (ref 94–109)
CO2 SERPL-SCNC: 28 MMOL/L (ref 20–32)
CREAT SERPL-MCNC: 1.17 MG/DL (ref 0.52–1.04)
ERYTHROCYTE [DISTWIDTH] IN BLOOD BY AUTOMATED COUNT: 13.4 % (ref 10–15)
GFR SERPL CREATININE-BSD FRML MDRD: 46 ML/MIN/1.7M2
GLUCOSE BLDC GLUCOMTR-MCNC: 124 MG/DL (ref 70–99)
GLUCOSE BLDC GLUCOMTR-MCNC: 129 MG/DL (ref 70–99)
GLUCOSE BLDC GLUCOMTR-MCNC: 140 MG/DL (ref 70–99)
GLUCOSE BLDC GLUCOMTR-MCNC: 171 MG/DL (ref 70–99)
GLUCOSE BLDC GLUCOMTR-MCNC: 179 MG/DL (ref 70–99)
GLUCOSE BLDC GLUCOMTR-MCNC: 193 MG/DL (ref 70–99)
GLUCOSE SERPL-MCNC: 123 MG/DL (ref 70–99)
HCT VFR BLD AUTO: 36 % (ref 35–47)
HGB BLD-MCNC: 11.7 G/DL (ref 11.7–15.7)
LIPASE SERPL-CCNC: 846 U/L (ref 73–393)
Lab: NORMAL
MCH RBC QN AUTO: 27.1 PG (ref 26.5–33)
MCHC RBC AUTO-ENTMCNC: 32.5 G/DL (ref 31.5–36.5)
MCV RBC AUTO: 83 FL (ref 78–100)
MICRO REPORT STATUS: NORMAL
PLATELET # BLD AUTO: 105 10E9/L (ref 150–450)
POTASSIUM SERPL-SCNC: 3.3 MMOL/L (ref 3.4–5.3)
PROT SERPL-MCNC: 6.5 G/DL (ref 6.8–8.8)
RBC # BLD AUTO: 4.32 10E12/L (ref 3.8–5.2)
SODIUM SERPL-SCNC: 143 MMOL/L (ref 133–144)
SPECIMEN SOURCE: NORMAL
WBC # BLD AUTO: 6 10E9/L (ref 4–11)

## 2017-08-10 PROCEDURE — 83690 ASSAY OF LIPASE: CPT | Performed by: INTERNAL MEDICINE

## 2017-08-10 PROCEDURE — 85027 COMPLETE CBC AUTOMATED: CPT | Performed by: INTERNAL MEDICINE

## 2017-08-10 PROCEDURE — A9270 NON-COVERED ITEM OR SERVICE: HCPCS | Mod: GY | Performed by: INTERNAL MEDICINE

## 2017-08-10 PROCEDURE — 82150 ASSAY OF AMYLASE: CPT | Performed by: INTERNAL MEDICINE

## 2017-08-10 PROCEDURE — 36415 COLL VENOUS BLD VENIPUNCTURE: CPT | Performed by: INTERNAL MEDICINE

## 2017-08-10 PROCEDURE — A9270 NON-COVERED ITEM OR SERVICE: HCPCS | Mod: GY | Performed by: PHYSICIAN ASSISTANT

## 2017-08-10 PROCEDURE — 40000193 ZZH STATISTIC PT WARD VISIT

## 2017-08-10 PROCEDURE — 25000132 ZZH RX MED GY IP 250 OP 250 PS 637: Mod: GY | Performed by: PHYSICIAN ASSISTANT

## 2017-08-10 PROCEDURE — 80048 BASIC METABOLIC PNL TOTAL CA: CPT | Performed by: INTERNAL MEDICINE

## 2017-08-10 PROCEDURE — 25000132 ZZH RX MED GY IP 250 OP 250 PS 637: Mod: GY | Performed by: STUDENT IN AN ORGANIZED HEALTH CARE EDUCATION/TRAINING PROGRAM

## 2017-08-10 PROCEDURE — 00000146 ZZHCL STATISTIC GLUCOSE BY METER IP

## 2017-08-10 PROCEDURE — A9270 NON-COVERED ITEM OR SERVICE: HCPCS | Mod: GY | Performed by: STUDENT IN AN ORGANIZED HEALTH CARE EDUCATION/TRAINING PROGRAM

## 2017-08-10 PROCEDURE — 99232 SBSQ HOSP IP/OBS MODERATE 35: CPT | Performed by: STUDENT IN AN ORGANIZED HEALTH CARE EDUCATION/TRAINING PROGRAM

## 2017-08-10 PROCEDURE — 97110 THERAPEUTIC EXERCISES: CPT | Mod: GP

## 2017-08-10 PROCEDURE — 25000128 H RX IP 250 OP 636: Performed by: INTERNAL MEDICINE

## 2017-08-10 PROCEDURE — 25000132 ZZH RX MED GY IP 250 OP 250 PS 637: Mod: GY | Performed by: INTERNAL MEDICINE

## 2017-08-10 PROCEDURE — 12000001 ZZH R&B MED SURG/OB UMMC

## 2017-08-10 PROCEDURE — 97140 MANUAL THERAPY 1/> REGIONS: CPT | Mod: GO | Performed by: OCCUPATIONAL THERAPIST

## 2017-08-10 PROCEDURE — 97110 THERAPEUTIC EXERCISES: CPT | Mod: GO

## 2017-08-10 PROCEDURE — 40000133 ZZH STATISTIC OT WARD VISIT

## 2017-08-10 PROCEDURE — 80076 HEPATIC FUNCTION PANEL: CPT | Performed by: INTERNAL MEDICINE

## 2017-08-10 PROCEDURE — 97116 GAIT TRAINING THERAPY: CPT | Mod: GP

## 2017-08-10 PROCEDURE — 97530 THERAPEUTIC ACTIVITIES: CPT | Mod: GP

## 2017-08-10 PROCEDURE — 40000133 ZZH STATISTIC OT WARD VISIT: Performed by: OCCUPATIONAL THERAPIST

## 2017-08-10 RX ADMIN — TRAZODONE HYDROCHLORIDE 150 MG: 100 TABLET ORAL at 22:23

## 2017-08-10 RX ADMIN — FUROSEMIDE 80 MG: 40 TABLET ORAL at 09:00

## 2017-08-10 RX ADMIN — PREGABALIN 100 MG: 50 CAPSULE ORAL at 20:08

## 2017-08-10 RX ADMIN — LEVOTHYROXINE SODIUM 175 MCG: 25 TABLET ORAL at 06:10

## 2017-08-10 RX ADMIN — ACETAMINOPHEN 650 MG: 325 TABLET, FILM COATED ORAL at 22:23

## 2017-08-10 RX ADMIN — FEBUXOSTAT 40 MG: 40 TABLET ORAL at 20:08

## 2017-08-10 RX ADMIN — CEFTRIAXONE 1 G: 1 INJECTION, POWDER, FOR SOLUTION INTRAMUSCULAR; INTRAVENOUS at 16:36

## 2017-08-10 RX ADMIN — GUAIFENESIN 600 MG: 600 TABLET, EXTENDED RELEASE ORAL at 20:08

## 2017-08-10 RX ADMIN — POTASSIUM CHLORIDE 20 MEQ: 750 TABLET, EXTENDED RELEASE ORAL at 20:08

## 2017-08-10 RX ADMIN — ACETAMINOPHEN 650 MG: 325 TABLET, FILM COATED ORAL at 02:55

## 2017-08-10 RX ADMIN — GUAIFENESIN 600 MG: 600 TABLET, EXTENDED RELEASE ORAL at 09:01

## 2017-08-10 RX ADMIN — POTASSIUM CHLORIDE 20 MEQ: 750 TABLET, EXTENDED RELEASE ORAL at 09:00

## 2017-08-10 RX ADMIN — FUROSEMIDE 80 MG: 40 TABLET ORAL at 16:36

## 2017-08-10 RX ADMIN — ACETAMINOPHEN 650 MG: 325 TABLET, FILM COATED ORAL at 14:06

## 2017-08-10 RX ADMIN — ATORVASTATIN CALCIUM 40 MG: 40 TABLET, FILM COATED ORAL at 20:08

## 2017-08-10 RX ADMIN — ESCITALOPRAM OXALATE 20 MG: 20 TABLET ORAL at 09:00

## 2017-08-10 RX ADMIN — PREGABALIN 100 MG: 50 CAPSULE ORAL at 09:00

## 2017-08-10 RX ADMIN — TIZANIDINE 4 MG: 4 TABLET ORAL at 22:58

## 2017-08-10 RX ADMIN — ACETAMINOPHEN 650 MG: 325 TABLET, FILM COATED ORAL at 18:16

## 2017-08-10 RX ADMIN — POLYETHYLENE GLYCOL 3350 17 G: 17 POWDER, FOR SOLUTION ORAL at 09:01

## 2017-08-10 NOTE — PLAN OF CARE
Problem: Goal Outcome Summary  Goal: Goal Outcome Summary  Edema 5A: Initial edema evaluation completed. Patient with chronic LE edema and uses compression wraps at baseline. LEs with skin intact and generalized 1+/2+ pitting distally. Application of GCB from MTPs to knee creases for management of LE edema. Remove wraps if causing pain/numbness or become soiled.      Recommend continued use of compression wraps following hospital discharge - patient reports her roommate applies wraps every 48 hours when home.

## 2017-08-10 NOTE — PROGRESS NOTES
[facility] - Hospital Medicine Daily Note   Date of Service: August 10, 2017  Patient: Mariel Ribeiro  MRN: [id number]  Admission Date: 8/7/2017  Hospital Day # 2    Assessment & Plan: Mariel Ribeiro is a 72 year old woman with a history of morbid obesity, HFpEF, COPD, and recurrent acute pancreatitis with symptomatic cholelithiasis  admitted to the [facility] with acute gallstone pancreatitis.    #) Acute gallstone pancreatitis:  Pain much improved and tolerating oral diet well.  Labs nicely downtrending.  -NPO after MN  - lap jass tomorrow with IOC +/- ERCP   -No need for IVF  -No further cardiac evaluation prior to surgery; please avoid volume overload.  - May require postoperative NIPPV, monitor closely    #) Pancreatic head cysts:  Monitor for now.   - Follow up with Dr. Almodovar as outpatient    #) History of opiate dependence, in early remission:  Minimize postoperatively    #) Urinary tract infection:  Culture negative but did have symptoms, given surgery give ceftriaxone x 3 days    #) Left upper extremity hematoma: from [facility] injection.  Hold for now.  Monitor and elevate.     #) Type 2 Diabetes Mellitus with neuropathy:  Elevated BG here but refusing insulin.  -discuss with patient  -consider DM educator consult  -monitor FSBG  -hold home meds  - lyrica and tizanadine.    #) HFpEF:  Well compensated.  Restarted furosemide yesterday, monitor.  Avoid fluid overload.     #) Chronic thrombocytopenia: Stable, monitor    #) Hypothyroidism:  Continue levothyroxine    #) COPD:   Continue home meds.     #) lower extremity edema:   Lymphedema consult    Consulting Services: Surgery    CODE: Full  DVT: ambulate, SCD's   Diet:: Regular, NPO p MN  Disposition: home 1-2 days after surgery.       Pt's care was discussed with bedside RN    Joao Lundberg MD, FACP, FAAP  Hospitalist  Medicine & Pediatrics   [facility]   Pager: [phone]    Team: Medicine [facility]  9  Page Cross Cover after 5 pm: pager [phone]     ___________________________________________________________________    Subjective & Interval Hx:  Feels well today, anticipating surgery.  Still having some abdominal pain.  Dog visited last night which made her happy. Nursing notes reviewed.    ROS:  4 point ROS including Respiratory, CV, GI and , other than that noted in the HPI, is negative      Medications: Reviewed in EPIC.      Physical Exam:    Blood pressure 158/71, pulse 57, temperature 96.8  F (36  C), temperature source Oral, resp. rate 18, weight 133.5 kg (294 lb 4.8 oz), SpO2 96 %, not currently breastfeeding.  General: Well appearing  HEENT: Anicteric, extraocular muscles intact. Mucous membranes moist.   Neck: supple   Chest/Resp: clear to auscultation bilaterally with good air entry except a few crackles   Heart/CV: normal rate, regular rhythm. No murmurs, rubs, or gallops   Abdomen/GI: RUQ tenderness to palpation but no peritoneal signs.  Silvestre.   Extremities/MSK: warm well perfused 2+ edema to knees  Skin: 10x10 cm warm left upper extremity hematoma, tenderness to palpation, normal pulses and capillary refill <2 seconds    Labs & Studies of Note: I personally all lab in imaging studies in the last 24h.    All cultures:    Recent Labs  Lab 08/09/17  2127 08/08/17  1645 08/08/17  0723 08/08/17  0720   CULT Pending No growth No growth after 2 days No growth after 2 days

## 2017-08-10 NOTE — PLAN OF CARE
Problem: Goal Outcome Summary  Goal: Goal Outcome Summary  PT 5A - Pt with limited tolerance for mobility today d/t increased pain from peripheral neuropathy.  Pt required SBA for functional transfers.  Pt ambulated 50 ft x2 bouts with SPC and HHA along with CGA.  Pt does have 2 SPC's and FWW for use at home if needed.     Recommend: Discharge home with assist from roommate and home PT to address strength and activity tolerance deficits further

## 2017-08-10 NOTE — PLAN OF CARE
Problem: Goal Outcome Summary  Goal: Goal Outcome Summary  OT/5A: Issued theraband and handout for UE strength exercises, pt familiar with theraband use. Pt able to perform with min cues. Recommended completing 2x per day. Pt receptive.        Per plan established by the OT, the recommendation for dc location is OT to continue 5x a week for functional endurance and safety for safe discharge home with roommate following surgery. Will continue.

## 2017-08-10 NOTE — PLAN OF CARE
Problem: Goal Outcome Summary  Goal: Goal Outcome Summary  Outcome: No Change  Pt A&Ox4, VSS on RA. C/o back pain from arthritis, gave PRN tylenol x1 with some relief. BGs 140 and 171, pt refusing insulin, MD notified. Up SBA to bathroom, 1 BM today. WOC consulted for skin tear on L buttock, barrier cream applied. K+ 3.3, MD paged. Bruise on L arm, using ice and elevating arm on pillows. Plan for lionel harding 8/11. Pt to be NPO at midnight. Will continue to monitor and follow POC.

## 2017-08-10 NOTE — PROGRESS NOTES
Brief Gastroenterology and Hepatology note     Patient summary: Ms. Ribeiro a pleasant 71 yo female with a history of chronic cholelithiasis, COPD, CKD stage III, HTN, diastolic CHF, CAD s/p stent x 2 (2011), HLD, DM Type II, BELEN on CPAP, thyroid cancer s/p partial thyroidectomy (1980's) with subsequent hypothyroidism is admitted with RUQ pain, nausea. Patient has a known hx of gall stones. Patient states that she started having RUQ pain on Sunday evening suddenly accompanied by nausea, vomiting. No immediate concern for cholangitis. Patient currently afebrile, LFTs normal. No stones visualized per US. Surgery has also been consulted who are planning to take the patient for a cholecystectomy with Dr. Roberson on 8/11    Vitals: /52 (BP Location: Left arm)  Pulse 57  Temp 97  F (36.1  C) (Oral)  Resp 18  Wt 133.5 kg (294 lb 4.8 oz)  SpO2 94%  BMI 46.79 kg/m2  BMI= Body mass index is 46.79 kg/(m^2).     Constitutional: cooperative, pleasant, no acute distress  Eyes: Sclera anicteric  CV: RRR, distal heart sounds. No murmers, clicks  Respiratory: clear to auscultation bilaterally. No wheezes and crackles   Abd: Nondistended, +bs, no hepatosplenomegaly, tender diffusely especially in the epigastrium, no peritoneal signs  Skin: warm, perfused, no jaundice  Neuro: AAO x 3  MSK: +1 BLE edema pitting    K 3.3  LFTs WNL  WBC 6  hgb 11.7    Assessment and Plan    Symptomatic cholelithiasis: patient with chronic cholelithiasis admitted with RUQ pain. No concern for cholangitis at present. LFTs normal.  - patient to undergo cholecystectomy on 8/11  - if surgery wishes for gastroenterology to be involved in order for an ERCP to be performed, would be happy to assist but at present no such indication    Tray Maravilla MD  PGY 3, IM  Pager [phone]

## 2017-08-10 NOTE — PLAN OF CARE
Problem: Goal Outcome Summary  Goal: Goal Outcome Summary  Outcome: No Change  A&Ox4, VSS on RA, except high BP at beginning of shift 173/57, MD notified. Up with SBA, continuing PT/OT. C/o aching pain in back, PRN Tylenol x1 effective as well as sharp pain in feet when walking, PRN Tylenol x1. Denies nausea, tolerating CHO diet.  & 193, no insulin needed. UA sent, positive for infection. No BM on this shift, continuing bowel regimen. Right PIV SL, continuing IV abx. Awaiting lap jass surgery Friday 8/11.

## 2017-08-10 NOTE — PROGRESS NOTES
08/10/17 1200   General Information   Discipline OT   Onset of Edema (Chronic. Since 2015)   Affected Body Part(s) Right LE;Left LE   Edema Precaution Comments Patient has been using LE compression wraps daily for about 2 years.    Pain   Pain comments patient reports no pain in LEs at this time.    Edema Examination / Assessment   Skin Condition Pitting;Dryness;Intact   Skin Condition Comments Skin intact with dryness and generalized 1+/2+ pitting throughout distal portion of LEs.    ROM   Range of Motion (WFL) no deficits were identified   Strength   Strength (WFL) (genealized weakness throughout )   Sensation   Sensation (WFL) no deficits were identified  (light touch intact. )   Assessment/Plan   Patient presents with Edema   Assessment Recommend use of GCB for management of chronic LE edema.    Assessment of Occupational Performance 1-3 Performance Deficits   Identified Performance Deficits Decreased functional mobility    Clinical Decision Making (Complexity) Low complexity   Planned Edema Interventions Gradient compression bandaging;Fit for compression garment;Exercises;Precautions to prevent infection/exacerbation;Education   Treatment Frequency 5 times/wk   Treatment Duration 1 week    Patient, Family and/or Staff in agreement with plan of care. Yes   Risks and benefits of treatment have been explained. Yes   Total Evaluation Time   Total Evaluation Time (Minutes) 5

## 2017-08-10 NOTE — PLAN OF CARE
Problem: Goal Outcome Summary  Goal: Goal Outcome Summary  Outcome: No Change  Pt up intermittenly overnight.  No BM, +BS all quadrants. PRN tylenol given overnight for discomfort. PIV saline locked. CHO diet, tolerating well, denies nausea. SBA ambulated in halls, room and to bathroom. Afebrile, slight hypertension after ambulation. Iv Abx ordered daily for positive UA.   Awaiting lap jass on Friday.  Will continue to monitor.

## 2017-08-11 ENCOUNTER — ANESTHESIA (OUTPATIENT)
Dept: SURGERY | Facility: CLINIC | Age: 72
DRG: 417 | End: 2017-08-11
Payer: MEDICARE

## 2017-08-11 LAB
ABO + RH BLD: NORMAL
ABO + RH BLD: NORMAL
ALBUMIN SERPL-MCNC: 3.2 G/DL (ref 3.4–5)
ALP SERPL-CCNC: 96 U/L (ref 40–150)
ALT SERPL W P-5'-P-CCNC: 22 U/L (ref 0–50)
AMYLASE SERPL-CCNC: 129 U/L (ref 30–110)
ANION GAP SERPL CALCULATED.3IONS-SCNC: 8 MMOL/L (ref 3–14)
AST SERPL W P-5'-P-CCNC: 17 U/L (ref 0–45)
BILIRUB DIRECT SERPL-MCNC: 0.1 MG/DL (ref 0–0.2)
BILIRUB SERPL-MCNC: 0.4 MG/DL (ref 0.2–1.3)
BLD GP AB SCN SERPL QL: NORMAL
BLOOD BANK CMNT PATIENT-IMP: NORMAL
BUN SERPL-MCNC: 21 MG/DL (ref 7–30)
CALCIUM SERPL-MCNC: 9.7 MG/DL (ref 8.5–10.1)
CHLORIDE SERPL-SCNC: 106 MMOL/L (ref 94–109)
CO2 SERPL-SCNC: 28 MMOL/L (ref 20–32)
CREAT SERPL-MCNC: 1.22 MG/DL (ref 0.52–1.04)
ERYTHROCYTE [DISTWIDTH] IN BLOOD BY AUTOMATED COUNT: 13.4 % (ref 10–15)
GFR SERPL CREATININE-BSD FRML MDRD: 43 ML/MIN/1.7M2
GLUCOSE BLDC GLUCOMTR-MCNC: 139 MG/DL (ref 70–99)
GLUCOSE BLDC GLUCOMTR-MCNC: 140 MG/DL (ref 70–99)
GLUCOSE BLDC GLUCOMTR-MCNC: 154 MG/DL (ref 70–99)
GLUCOSE BLDC GLUCOMTR-MCNC: 163 MG/DL (ref 70–99)
GLUCOSE BLDC GLUCOMTR-MCNC: 163 MG/DL (ref 70–99)
GLUCOSE BLDC GLUCOMTR-MCNC: 181 MG/DL (ref 70–99)
GLUCOSE BLDC GLUCOMTR-MCNC: 182 MG/DL (ref 70–99)
GLUCOSE SERPL-MCNC: 147 MG/DL (ref 70–99)
HCT VFR BLD AUTO: 38.4 % (ref 35–47)
HGB BLD-MCNC: 12.4 G/DL (ref 11.7–15.7)
LIPASE SERPL-CCNC: 779 U/L (ref 73–393)
MCH RBC QN AUTO: 26.8 PG (ref 26.5–33)
MCHC RBC AUTO-ENTMCNC: 32.3 G/DL (ref 31.5–36.5)
MCV RBC AUTO: 83 FL (ref 78–100)
PLATELET # BLD AUTO: 120 10E9/L (ref 150–450)
POTASSIUM SERPL-SCNC: 3.7 MMOL/L (ref 3.4–5.3)
PROT SERPL-MCNC: 6.6 G/DL (ref 6.8–8.8)
RBC # BLD AUTO: 4.62 10E12/L (ref 3.8–5.2)
SODIUM SERPL-SCNC: 141 MMOL/L (ref 133–144)
SPECIMEN EXP DATE BLD: NORMAL
WBC # BLD AUTO: 5.5 10E9/L (ref 4–11)

## 2017-08-11 PROCEDURE — 25000128 H RX IP 250 OP 636: Performed by: NURSE ANESTHETIST, CERTIFIED REGISTERED

## 2017-08-11 PROCEDURE — 40000275 ZZH STATISTIC RCP TIME EA 10 MIN

## 2017-08-11 PROCEDURE — 25000132 ZZH RX MED GY IP 250 OP 250 PS 637: Mod: GY | Performed by: STUDENT IN AN ORGANIZED HEALTH CARE EDUCATION/TRAINING PROGRAM

## 2017-08-11 PROCEDURE — 00000146 ZZHCL STATISTIC GLUCOSE BY METER IP

## 2017-08-11 PROCEDURE — 25000132 ZZH RX MED GY IP 250 OP 250 PS 637: Mod: GY | Performed by: INTERNAL MEDICINE

## 2017-08-11 PROCEDURE — 37000009 ZZH ANESTHESIA TECHNICAL FEE, EACH ADDTL 15 MIN: Performed by: SURGERY

## 2017-08-11 PROCEDURE — 82248 BILIRUBIN DIRECT: CPT | Performed by: STUDENT IN AN ORGANIZED HEALTH CARE EDUCATION/TRAINING PROGRAM

## 2017-08-11 PROCEDURE — A9270 NON-COVERED ITEM OR SERVICE: HCPCS | Mod: GY | Performed by: INTERNAL MEDICINE

## 2017-08-11 PROCEDURE — 25000125 ZZHC RX 250: Performed by: NURSE ANESTHETIST, CERTIFIED REGISTERED

## 2017-08-11 PROCEDURE — 27210794 ZZH OR GENERAL SUPPLY STERILE: Performed by: SURGERY

## 2017-08-11 PROCEDURE — 80076 HEPATIC FUNCTION PANEL: CPT | Performed by: STUDENT IN AN ORGANIZED HEALTH CARE EDUCATION/TRAINING PROGRAM

## 2017-08-11 PROCEDURE — 25000566 ZZH SEVOFLURANE, EA 15 MIN: Performed by: SURGERY

## 2017-08-11 PROCEDURE — 25000132 ZZH RX MED GY IP 250 OP 250 PS 637: Mod: GY | Performed by: PHYSICIAN ASSISTANT

## 2017-08-11 PROCEDURE — 12000001 ZZH R&B MED SURG/OB UMMC

## 2017-08-11 PROCEDURE — 25000564 ZZH DESFLURANE, EA 15 MIN: Performed by: SURGERY

## 2017-08-11 PROCEDURE — A9270 NON-COVERED ITEM OR SERVICE: HCPCS | Mod: GY | Performed by: PHYSICIAN ASSISTANT

## 2017-08-11 PROCEDURE — C9399 UNCLASSIFIED DRUGS OR BIOLOG: HCPCS | Performed by: NURSE ANESTHETIST, CERTIFIED REGISTERED

## 2017-08-11 PROCEDURE — 36415 COLL VENOUS BLD VENIPUNCTURE: CPT | Performed by: STUDENT IN AN ORGANIZED HEALTH CARE EDUCATION/TRAINING PROGRAM

## 2017-08-11 PROCEDURE — 36000057 ZZH SURGERY LEVEL 3 1ST 30 MIN - UMMC: Performed by: SURGERY

## 2017-08-11 PROCEDURE — 80048 BASIC METABOLIC PNL TOTAL CA: CPT | Performed by: STUDENT IN AN ORGANIZED HEALTH CARE EDUCATION/TRAINING PROGRAM

## 2017-08-11 PROCEDURE — 85027 COMPLETE CBC AUTOMATED: CPT | Performed by: STUDENT IN AN ORGANIZED HEALTH CARE EDUCATION/TRAINING PROGRAM

## 2017-08-11 PROCEDURE — 71000015 ZZH RECOVERY PHASE 1 LEVEL 2 EA ADDTL HR: Performed by: SURGERY

## 2017-08-11 PROCEDURE — 83690 ASSAY OF LIPASE: CPT | Performed by: STUDENT IN AN ORGANIZED HEALTH CARE EDUCATION/TRAINING PROGRAM

## 2017-08-11 PROCEDURE — 86900 BLOOD TYPING SEROLOGIC ABO: CPT | Performed by: ANESTHESIOLOGY

## 2017-08-11 PROCEDURE — C9290 INJ, BUPIVACAINE LIPOSOME: HCPCS | Performed by: ANESTHESIOLOGY

## 2017-08-11 PROCEDURE — 36000059 ZZH SURGERY LEVEL 3 EA 15 ADDTL MIN UMMC: Performed by: SURGERY

## 2017-08-11 PROCEDURE — 40000171 ZZH STATISTIC PRE-PROCEDURE ASSESSMENT III: Performed by: SURGERY

## 2017-08-11 PROCEDURE — 40000014 ZZH STATISTIC ARTERIAL MONITORING DAILY

## 2017-08-11 PROCEDURE — 82150 ASSAY OF AMYLASE: CPT | Performed by: STUDENT IN AN ORGANIZED HEALTH CARE EDUCATION/TRAINING PROGRAM

## 2017-08-11 PROCEDURE — 99211 OFF/OP EST MAY X REQ PHY/QHP: CPT

## 2017-08-11 PROCEDURE — 86850 RBC ANTIBODY SCREEN: CPT | Performed by: ANESTHESIOLOGY

## 2017-08-11 PROCEDURE — 25000125 ZZHC RX 250: Performed by: ANESTHESIOLOGY

## 2017-08-11 PROCEDURE — A9270 NON-COVERED ITEM OR SERVICE: HCPCS | Mod: GY | Performed by: STUDENT IN AN ORGANIZED HEALTH CARE EDUCATION/TRAINING PROGRAM

## 2017-08-11 PROCEDURE — 0WQF4ZZ REPAIR ABDOMINAL WALL, PERCUTANEOUS ENDOSCOPIC APPROACH: ICD-10-PCS | Performed by: SURGERY

## 2017-08-11 PROCEDURE — A9270 NON-COVERED ITEM OR SERVICE: HCPCS | Mod: GY | Performed by: NURSE ANESTHETIST, CERTIFIED REGISTERED

## 2017-08-11 PROCEDURE — 25000128 H RX IP 250 OP 636: Performed by: ANESTHESIOLOGY

## 2017-08-11 PROCEDURE — 37000008 ZZH ANESTHESIA TECHNICAL FEE, 1ST 30 MIN: Performed by: SURGERY

## 2017-08-11 PROCEDURE — 25000132 ZZH RX MED GY IP 250 OP 250 PS 637: Mod: GY | Performed by: NURSE ANESTHETIST, CERTIFIED REGISTERED

## 2017-08-11 PROCEDURE — 88304 TISSUE EXAM BY PATHOLOGIST: CPT | Performed by: SURGERY

## 2017-08-11 PROCEDURE — 86901 BLOOD TYPING SEROLOGIC RH(D): CPT | Performed by: ANESTHESIOLOGY

## 2017-08-11 PROCEDURE — 71000014 ZZH RECOVERY PHASE 1 LEVEL 2 FIRST HR: Performed by: SURGERY

## 2017-08-11 PROCEDURE — S0020 INJECTION, BUPIVICAINE HYDRO: HCPCS | Performed by: ANESTHESIOLOGY

## 2017-08-11 PROCEDURE — 0FT44ZZ RESECTION OF GALLBLADDER, PERCUTANEOUS ENDOSCOPIC APPROACH: ICD-10-PCS | Performed by: SURGERY

## 2017-08-11 RX ORDER — FENTANYL CITRATE 50 UG/ML
INJECTION, SOLUTION INTRAMUSCULAR; INTRAVENOUS PRN
Status: DISCONTINUED | OUTPATIENT
Start: 2017-08-11 | End: 2017-08-11

## 2017-08-11 RX ORDER — LIDOCAINE HYDROCHLORIDE 20 MG/ML
INJECTION, SOLUTION INFILTRATION; PERINEURAL PRN
Status: DISCONTINUED | OUTPATIENT
Start: 2017-08-11 | End: 2017-08-11

## 2017-08-11 RX ORDER — FLUMAZENIL 0.1 MG/ML
0.2 INJECTION, SOLUTION INTRAVENOUS
Status: DISCONTINUED | OUTPATIENT
Start: 2017-08-11 | End: 2017-08-11 | Stop reason: HOSPADM

## 2017-08-11 RX ORDER — NALOXONE HYDROCHLORIDE 0.4 MG/ML
.1-.4 INJECTION, SOLUTION INTRAMUSCULAR; INTRAVENOUS; SUBCUTANEOUS
Status: DISCONTINUED | OUTPATIENT
Start: 2017-08-11 | End: 2017-08-11 | Stop reason: HOSPADM

## 2017-08-11 RX ORDER — NALOXONE HYDROCHLORIDE 0.4 MG/ML
.1-.4 INJECTION, SOLUTION INTRAMUSCULAR; INTRAVENOUS; SUBCUTANEOUS
Status: DISCONTINUED | OUTPATIENT
Start: 2017-08-11 | End: 2017-08-12 | Stop reason: HOSPADM

## 2017-08-11 RX ORDER — HYDRALAZINE HYDROCHLORIDE 20 MG/ML
INJECTION INTRAMUSCULAR; INTRAVENOUS PRN
Status: DISCONTINUED | OUTPATIENT
Start: 2017-08-11 | End: 2017-08-11

## 2017-08-11 RX ORDER — CEFAZOLIN SODIUM 1 G/3ML
INJECTION, POWDER, FOR SOLUTION INTRAMUSCULAR; INTRAVENOUS PRN
Status: DISCONTINUED | OUTPATIENT
Start: 2017-08-11 | End: 2017-08-11

## 2017-08-11 RX ORDER — ETOMIDATE 2 MG/ML
INJECTION INTRAVENOUS PRN
Status: DISCONTINUED | OUTPATIENT
Start: 2017-08-11 | End: 2017-08-11

## 2017-08-11 RX ORDER — ONDANSETRON 2 MG/ML
INJECTION INTRAMUSCULAR; INTRAVENOUS PRN
Status: DISCONTINUED | OUTPATIENT
Start: 2017-08-11 | End: 2017-08-11

## 2017-08-11 RX ORDER — ONDANSETRON 2 MG/ML
4 INJECTION INTRAMUSCULAR; INTRAVENOUS EVERY 30 MIN PRN
Status: DISCONTINUED | OUTPATIENT
Start: 2017-08-11 | End: 2017-08-11 | Stop reason: HOSPADM

## 2017-08-11 RX ORDER — FLUORIDE TOOTHPASTE
10 TOOTHPASTE DENTAL 4 TIMES DAILY PRN
Status: DISCONTINUED | OUTPATIENT
Start: 2017-08-11 | End: 2017-08-12 | Stop reason: HOSPADM

## 2017-08-11 RX ORDER — ESMOLOL HYDROCHLORIDE 10 MG/ML
INJECTION INTRAVENOUS PRN
Status: DISCONTINUED | OUTPATIENT
Start: 2017-08-11 | End: 2017-08-11

## 2017-08-11 RX ORDER — SODIUM CHLORIDE, SODIUM LACTATE, POTASSIUM CHLORIDE, CALCIUM CHLORIDE 600; 310; 30; 20 MG/100ML; MG/100ML; MG/100ML; MG/100ML
INJECTION, SOLUTION INTRAVENOUS CONTINUOUS
Status: DISCONTINUED | OUTPATIENT
Start: 2017-08-11 | End: 2017-08-11 | Stop reason: HOSPADM

## 2017-08-11 RX ORDER — GLYCOPYRROLATE 0.2 MG/ML
INJECTION, SOLUTION INTRAMUSCULAR; INTRAVENOUS PRN
Status: DISCONTINUED | OUTPATIENT
Start: 2017-08-11 | End: 2017-08-11

## 2017-08-11 RX ORDER — ACETAMINOPHEN 325 MG/1
650 TABLET ORAL EVERY 8 HOURS
Status: DISCONTINUED | OUTPATIENT
Start: 2017-08-11 | End: 2017-08-12 | Stop reason: HOSPADM

## 2017-08-11 RX ORDER — GABAPENTIN 100 MG/1
CAPSULE ORAL PRN
Status: DISCONTINUED | OUTPATIENT
Start: 2017-08-11 | End: 2017-08-11

## 2017-08-11 RX ORDER — FENTANYL CITRATE 50 UG/ML
25-50 INJECTION, SOLUTION INTRAMUSCULAR; INTRAVENOUS
Status: DISCONTINUED | OUTPATIENT
Start: 2017-08-11 | End: 2017-08-11 | Stop reason: HOSPADM

## 2017-08-11 RX ORDER — ONDANSETRON 4 MG/1
4 TABLET, ORALLY DISINTEGRATING ORAL EVERY 30 MIN PRN
Status: DISCONTINUED | OUTPATIENT
Start: 2017-08-11 | End: 2017-08-11 | Stop reason: HOSPADM

## 2017-08-11 RX ORDER — ACETAMINOPHEN 325 MG/1
TABLET ORAL PRN
Status: DISCONTINUED | OUTPATIENT
Start: 2017-08-11 | End: 2017-08-11

## 2017-08-11 RX ORDER — SODIUM CHLORIDE, SODIUM LACTATE, POTASSIUM CHLORIDE, CALCIUM CHLORIDE 600; 310; 30; 20 MG/100ML; MG/100ML; MG/100ML; MG/100ML
INJECTION, SOLUTION INTRAVENOUS CONTINUOUS PRN
Status: DISCONTINUED | OUTPATIENT
Start: 2017-08-11 | End: 2017-08-11

## 2017-08-11 RX ORDER — BUPIVACAINE HYDROCHLORIDE 2.5 MG/ML
INJECTION, SOLUTION EPIDURAL; INFILTRATION; INTRACAUDAL PRN
Status: DISCONTINUED | OUTPATIENT
Start: 2017-08-11 | End: 2017-08-11

## 2017-08-11 RX ORDER — KETAMINE HYDROCHLORIDE 10 MG/ML
INJECTION, SOLUTION INTRAMUSCULAR; INTRAVENOUS PRN
Status: DISCONTINUED | OUTPATIENT
Start: 2017-08-11 | End: 2017-08-11

## 2017-08-11 RX ORDER — HYDROMORPHONE HCL/0.9% NACL/PF 0.2MG/0.2
0.2 SYRINGE (ML) INTRAVENOUS EVERY 4 HOURS PRN
Status: DISCONTINUED | OUTPATIENT
Start: 2017-08-11 | End: 2017-08-12 | Stop reason: HOSPADM

## 2017-08-11 RX ORDER — HYDROMORPHONE HYDROCHLORIDE 2 MG/1
2 TABLET ORAL EVERY 4 HOURS PRN
Qty: 20 TABLET | Refills: 0 | Status: SHIPPED | OUTPATIENT
Start: 2017-08-11 | End: 2017-08-21

## 2017-08-11 RX ADMIN — GUAIFENESIN 600 MG: 600 TABLET, EXTENDED RELEASE ORAL at 19:54

## 2017-08-11 RX ADMIN — FENTANYL CITRATE 25 MCG: 50 INJECTION, SOLUTION INTRAMUSCULAR; INTRAVENOUS at 13:09

## 2017-08-11 RX ADMIN — ACETAMINOPHEN 650 MG: 325 TABLET ORAL at 11:22

## 2017-08-11 RX ADMIN — ACETAMINOPHEN 650 MG: 325 TABLET, FILM COATED ORAL at 19:53

## 2017-08-11 RX ADMIN — DEXMEDETOMIDINE HYDROCHLORIDE 0.4 MCG/KG/HR: 100 INJECTION, SOLUTION INTRAVENOUS at 13:00

## 2017-08-11 RX ADMIN — TIZANIDINE 4 MG: 4 TABLET ORAL at 22:44

## 2017-08-11 RX ADMIN — LEVOTHYROXINE SODIUM 175 MCG: 25 TABLET ORAL at 19:54

## 2017-08-11 RX ADMIN — CEFAZOLIN 3 G: 1 INJECTION, POWDER, FOR SOLUTION INTRAMUSCULAR; INTRAVENOUS at 12:35

## 2017-08-11 RX ADMIN — HYDROMORPHONE HYDROCHLORIDE 0.2 MG: 1 INJECTION, SOLUTION INTRAMUSCULAR; INTRAVENOUS; SUBCUTANEOUS at 14:07

## 2017-08-11 RX ADMIN — Medication 0.1 MG: at 13:02

## 2017-08-11 RX ADMIN — SUGAMMADEX 270 MG: 100 INJECTION, SOLUTION INTRAVENOUS at 14:33

## 2017-08-11 RX ADMIN — PREGABALIN 100 MG: 50 CAPSULE ORAL at 19:54

## 2017-08-11 RX ADMIN — FENTANYL CITRATE 75 MCG: 50 INJECTION, SOLUTION INTRAMUSCULAR; INTRAVENOUS at 12:01

## 2017-08-11 RX ADMIN — ESMOLOL HYDROCHLORIDE 20 MG: 10 INJECTION, SOLUTION INTRAVENOUS at 12:07

## 2017-08-11 RX ADMIN — ATORVASTATIN CALCIUM 40 MG: 40 TABLET, FILM COATED ORAL at 19:54

## 2017-08-11 RX ADMIN — ESMOLOL HYDROCHLORIDE 20 MG: 10 INJECTION, SOLUTION INTRAVENOUS at 12:33

## 2017-08-11 RX ADMIN — HYDRALAZINE HYDROCHLORIDE 5 MG: 20 INJECTION INTRAMUSCULAR; INTRAVENOUS at 14:16

## 2017-08-11 RX ADMIN — LIDOCAINE HYDROCHLORIDE 100 MG: 20 INJECTION, SOLUTION INFILTRATION; PERINEURAL at 12:01

## 2017-08-11 RX ADMIN — KETAMINE HCL-NACL SOLN PREF SY 50 MG/5ML-0.9% (10MG/ML) 20 MG: 10 SOLUTION PREFILLED SYRINGE at 12:01

## 2017-08-11 RX ADMIN — POTASSIUM CHLORIDE 20 MEQ: 750 TABLET, EXTENDED RELEASE ORAL at 19:54

## 2017-08-11 RX ADMIN — ROCURONIUM BROMIDE 10 MG: 10 INJECTION INTRAVENOUS at 12:45

## 2017-08-11 RX ADMIN — ACETAMINOPHEN 650 MG: 325 TABLET, FILM COATED ORAL at 06:41

## 2017-08-11 RX ADMIN — ESMOLOL HYDROCHLORIDE 20 MG: 10 INJECTION, SOLUTION INTRAVENOUS at 13:14

## 2017-08-11 RX ADMIN — ESMOLOL HYDROCHLORIDE 10 MG: 10 INJECTION, SOLUTION INTRAVENOUS at 13:12

## 2017-08-11 RX ADMIN — HYDROMORPHONE HYDROCHLORIDE 0.3 MG: 1 INJECTION, SOLUTION INTRAMUSCULAR; INTRAVENOUS; SUBCUTANEOUS at 13:20

## 2017-08-11 RX ADMIN — ONDANSETRON 4 MG: 2 INJECTION INTRAMUSCULAR; INTRAVENOUS at 14:18

## 2017-08-11 RX ADMIN — Medication 0.2 MG: at 12:01

## 2017-08-11 RX ADMIN — FENTANYL CITRATE 100 MCG: 50 INJECTION, SOLUTION INTRAMUSCULAR; INTRAVENOUS at 13:20

## 2017-08-11 RX ADMIN — ESMOLOL HYDROCHLORIDE 20 MG: 10 INJECTION, SOLUTION INTRAVENOUS at 12:12

## 2017-08-11 RX ADMIN — ETOMIDATE 20 MG: 2 INJECTION INTRAVENOUS at 12:01

## 2017-08-11 RX ADMIN — BUPIVACAINE 20 ML: 13.3 INJECTION, SUSPENSION, LIPOSOMAL INFILTRATION at 11:33

## 2017-08-11 RX ADMIN — KETAMINE HCL-NACL SOLN PREF SY 50 MG/5ML-0.9% (10MG/ML) 10 MG: 10 SOLUTION PREFILLED SYRINGE at 12:49

## 2017-08-11 RX ADMIN — BUPIVACAINE HYDROCHLORIDE 20 ML: 2.5 INJECTION, SOLUTION EPIDURAL; INFILTRATION; INTRACAUDAL at 11:33

## 2017-08-11 RX ADMIN — MIDAZOLAM HYDROCHLORIDE 2 MG: 1 INJECTION, SOLUTION INTRAMUSCULAR; INTRAVENOUS at 11:38

## 2017-08-11 RX ADMIN — ROCURONIUM BROMIDE 40 MG: 10 INJECTION INTRAVENOUS at 12:02

## 2017-08-11 RX ADMIN — FEBUXOSTAT 40 MG: 40 TABLET ORAL at 19:54

## 2017-08-11 RX ADMIN — SODIUM CHLORIDE, POTASSIUM CHLORIDE, SODIUM LACTATE AND CALCIUM CHLORIDE: 600; 310; 30; 20 INJECTION, SOLUTION INTRAVENOUS at 11:22

## 2017-08-11 RX ADMIN — GABAPENTIN 300 MG: 100 CAPSULE ORAL at 11:22

## 2017-08-11 RX ADMIN — TRAZODONE HYDROCHLORIDE 150 MG: 100 TABLET ORAL at 22:23

## 2017-08-11 RX ADMIN — HYDRALAZINE HYDROCHLORIDE 5 MG: 20 INJECTION INTRAMUSCULAR; INTRAVENOUS at 14:55

## 2017-08-11 RX ADMIN — ACETAMINOPHEN 650 MG: 325 TABLET, FILM COATED ORAL at 02:42

## 2017-08-11 RX ADMIN — ROCURONIUM BROMIDE 20 MG: 10 INJECTION INTRAVENOUS at 12:55

## 2017-08-11 RX ADMIN — KETAMINE HCL-NACL SOLN PREF SY 50 MG/5ML-0.9% (10MG/ML) 10 MG: 10 SOLUTION PREFILLED SYRINGE at 13:20

## 2017-08-11 RX ADMIN — KETAMINE HCL-NACL SOLN PREF SY 50 MG/5ML-0.9% (10MG/ML) 20 MG: 10 SOLUTION PREFILLED SYRINGE at 12:10

## 2017-08-11 NOTE — ANESTHESIA CARE TRANSFER NOTE
Patient: Mariel Ribeiro    Procedure(s):  Laparoscopic Cholecystectomy   *Latex Allergy*, Anesthesia Block - Wound Class: II-Clean Contaminated    Diagnosis: Pancreatitis   Diagnosis Additional Information: No value filed.    Anesthesia Type:   General, ETT, Peripheral Nerve Block     Note:  Airway :Face Mask  Patient transferred to:PACU  Comments: To PACU, VSS, airway patent, patient awake and follows commands, denies pain. RN at bedside.      Vitals: (Last set prior to Anesthesia Care Transfer)    CRNA VITALS  8/11/2017 1419 - 8/11/2017 1459      8/11/2017             NIBP: 177/80    Pulse: 76                Electronically Signed By: CHANTAL Small CRNA  August 11, 2017  2:59 PM

## 2017-08-11 NOTE — ANESTHESIA PROCEDURE NOTES
Arterial Line Procedure Note  Staff:     Anesthesiologist:  KELLEE SIMPSON  Location: In OR After Induction  Procedure Start/Stop Times:     patient identified, IV checked, site marked, risks and benefits discussed, informed consent, monitors and equipment checked, pre-op evaluation and at physician/surgeon's request      Correct Patient: Yes      Correct Position: Yes      Correct Site: Yes      Correct Procedure: Yes      Correct Laterality:  Yes    Site Marked:  Yes  Line Placement:     Procedure:  Arterial Line    Insertion Site:  Radial    Insertion laterality:  Right    Skin Prep: Chloraprep      Patient Prep: patient draped, mask, sterile gloves, hat and hand hygiene      Local skin infiltration:  None    Ultrasound Guided?: No      Arterial waveform: No      IBP within 10% of NIBP: No    Assessment/Narrative:      Wire unable to pass on either a line puncture.  Pt stable with anesthesia induction.  Decision to abort further arterial line attmpts.

## 2017-08-11 NOTE — OR NURSING
Pre op bilateral TAP block completed by Dr. Henley and Dr. Cochran with Wallyramonita Cruz SRNA observing. Pt tolerated well. No adverse effects noted. Will continue to monitor.

## 2017-08-11 NOTE — PLAN OF CARE
Problem: Goal Outcome Summary  Goal: Goal Outcome Summary     Report given to Nicole VERA on 3C for lap-jass. Patient transport to pre-op via NST accompanied by roommate Odalis.

## 2017-08-11 NOTE — PROGRESS NOTES
The patient will transfer to the Surgery service to convalesce from her procedure.  If you have questions or concerns or the Medicine service may be helpful, do not hesitate to contact our team 24h per day.    We are grateful for the Surgery team's collaboration.    Joao Lundberg MD  [phone]

## 2017-08-11 NOTE — PLAN OF CARE
Problem: Goal Outcome Summary  Goal: Goal Outcome Summary  Edema 5A: Cancel; patient in OR - will reschedule as patient is available.

## 2017-08-11 NOTE — ANESTHESIA POSTPROCEDURE EVALUATION
Patient: Mariel Ribeiro    Procedure(s):  Laparoscopic Cholecystectomy   *Latex Allergy*, Anesthesia Block - Wound Class: II-Clean Contaminated    Diagnosis:Pancreatitis   Diagnosis Additional Information: No value filed.    Anesthesia Type:  General, ETT, Peripheral Nerve Block    Note:  Anesthesia Post Evaluation    Patient location during evaluation: PACU  Patient participation: Other/plan (See Comments)  Level of consciousness: awake and agitated  Pain management: satisfactory to patient  Airway patency: patent  Cardiovascular status: blood pressure returned to baseline and hemodynamically stable  Respiratory status: room air  Hydration status: euvolemic  PONV: controlled     Anesthetic complications: None    Comments: Pt initially awake and oriented upon arrival to PACU.  Did not receive any postop analgesics.  Became less aware of surroundings and began picking at pulse oximetry probe and IV.  Needed frequent re-orientation to place and time.  Surgical service asked to evaluate patient.  They did so.  Thought admission and more time to clear anesthesia was indicated.  Discussion with PACU nurse regarding pt behavior.  She felt comfortable that the patient had stabilized from recovery and discharge from PACU was appropriate.          Last vitals:  Vitals:    08/11/17 1530 08/11/17 1545 08/11/17 1600   BP: 170/74 153/56 133/53   Pulse:      Resp: 20     Temp:   36.4  C (97.5  F)   SpO2: (!) 82% 97% 95%         Electronically Signed By: Eric Mcintosh MD  August 11, 2017  4:04 PM

## 2017-08-11 NOTE — ANESTHESIA PROCEDURE NOTES
Peripheral Nerve Block Procedure Note    Staff:     Anesthesiologist:  CARLOS ALBERTO MARTINEZ    Resident/CRNA:  GLENDA DOAN    Block performed by resident/CRNA in the presence of a teaching physician    Location: Pre-op  Procedure Start/Stop TImes:      8/11/2017 11:30 AM     8/11/2017 11:35 AM    patient identified, IV checked, site marked, risks and benefits discussed, informed consent, monitors and equipment checked, pre-op evaluation, at physician/surgeon's request and post-op pain management      Correct Patient: Yes      Correct Position: Yes      Correct Site: Yes      Correct Procedure: Yes      Correct Laterality:  N/A    Site Marked:  Yes  Procedure details:     Procedure:  TAP    ASA:  3    Laterality:  Bilateral    Position:  Supine    Sterile Prep: chloraprep      Needle:  Insulated    Needle gauge:  21    Needle length (mm):  110    Ultrasound: Yes      Ultrasound used to identify targeted nerve, plexus, or vascular structure and placed a needle adjacent to it      Permanent Image entered into patiient's record      Abnormal pain on injection: No      Blood Aspirated: No      Paresthesias:  No    Bleeding at site: No      Bolus via:  Needle    Infusion Method:  Single Shot    Complications:  None

## 2017-08-11 NOTE — PROGRESS NOTES
"SPIRITUAL HEALTH SERVICES  SPIRITUAL ASSESSMENT Progress Note  [facility] ([place] 5A     REFERRAL SOURCE: Pre-Surgery Visit    Mariel was present with her roommate Odalis when I entered the room.    Mariel shared that she is feeling calm and at peace before surgery and indicated that \"All is in God's hands... If He wants me I'm fine with it.\"  Mariel indicated that she never wants to go into surgery without feeling a sense of peace.  Mariel shared that her favorite Scripture passage is Brian 6:8, and Mariel has a daily reading devotion from a book written by Fredy Walls The Book of Awakening.     Mariel stated that her prayer life is strong, she indicated she prays for other people all the time, praying for their healing and peace.  Mariel mentioned that she has been through a lot of suffering as well and finds great comfort in her daily prayer life.  We prayed together before going to surgery.    PLAN: I will continue to follow pt while on unit.    Alberto Shah   Intern  Pager [phone]    "

## 2017-08-11 NOTE — PROGRESS NOTES
D/I/A/. Received consult for suspected pressure injury on buttocks. Assessed the area and noted 1 x 0.5 x 0.0cm blood blister with pinpont open area on right lower buttock closer to anal area. Area is firm and able to express some serosang drainage but pt wants writer leave it for now as she is going for a surgery. No pressure injury detected. Per pt she does get these blisters often due to moisture and resolved with utilization of barrier cream. Able to shift weight from side to side. Educated pt on pressure injury, risk factors, and preventive measures. Verbalized understanding.  P. Continue to follow pressure injury prevention protocol. Apply criticaid barrier cream daily. No further visit planned, will sign off.  Face to face time- 15mins

## 2017-08-11 NOTE — PLAN OF CARE
Problem: Goal Outcome Summary  Goal: Goal Outcome Summary  Outcome: No Change  A&Ox4, VSS on RA. Up with SBA. C/o generalized back and foot pain, Tylenol x2 effective. Right PIV SL, received IV abx.  & 179, not requiring insulin. Surgery prep wipes completed, pt notified NPO at midnight. Suspected pressure ulcer on coccyx pink, intact, barrier cream applied. Nikki regan scheduled for tomorrow.

## 2017-08-11 NOTE — PROGRESS NOTES
Surgery Progress note    S:  No overnight events.  Pt seen at bedside resting comfortably.  No acute issues. Denies n/v. Tolerating diet. No fevers or chills.     O:  /58 (BP Location: Left arm)  Pulse 56  Temp 96.5  F (35.8  C) (Oral)  Resp 18  Wt 133.5 kg (294 lb 4.8 oz)  SpO2 93%  BMI 46.79 kg/m2  A&Ox3, NAD  Breathing non-labored  RRR  Soft, obese, ND, minimally ttp over epigastrium and RUQ.   Distal extremities warm.         Intake/Output Summary (Last 24 hours) at 08/11/17 0620  Last data filed at 08/10/17 2100   Gross per 24 hour   Intake             1083 ml   Output                0 ml   Net             1083 ml         BMP  Recent Labs  Lab 08/10/17  0731 08/08/17  0720 08/07/17  1737    140 142   POTASSIUM 3.3* 3.4 3.5   CHLORIDE 106 106 103   ANTOINETTE 9.3 9.1 9.1   CO2 28 27 26   BUN 20 23 25   CR 1.17* 1.24* 1.28*   * 113* 142*     CBC  Recent Labs  Lab 08/10/17  0731 08/08/17  0720 08/07/17  1737   WBC 6.0 9.1 10.9   RBC 4.32 4.58 4.84   HGB 11.7 12.4 13.3   HCT 36.0 37.4 39.7   MCV 83 82 82   MCH 27.1 27.1 27.5   MCHC 32.5 33.2 33.5   RDW 13.4 13.6 12.9   * 106* 120*     INR  Recent Labs  Lab 08/08/17  0720   INR 1.12      Liver Function Studies -   Recent Labs   Lab Test  08/10/17   0731   PROTTOTAL  6.5*   ALBUMIN  3.0*   BILITOTAL  0.4   ALKPHOS  92   AST  15   ALT  22          A/P: Mariel Ribeiro is a 72 year old female with a history of CHF, COPD, and a recent hospital admission for acute pancreatitis in May who presents this hospitalization with signs and symptoms consistent with gallstone pancreatitis.   - OR today for laparoscopic cholecystectomy with IOC  - Orders placed and consent obtained     Janet Oliva MD  PGY-2, General Surgery  [phone]

## 2017-08-11 NOTE — OR NURSING
Patient with new confusion, Dr. Carrera at bedside. Possible hallucinations, pulling at lines, no longer following commands.  Dr. Carrera will come back in 15min to assess.    Discussed new confusion with Dr. Samuel, who attributes to anesthesia and would like patient monitored but no new orders.

## 2017-08-11 NOTE — PROGRESS NOTES
"                               Brief Gastroenterology and Hepatology note      Patient summary: Ms. Ribeiro a pleasant 73 yo female with a history of chronic cholelithiasis, COPD, CKD stage III, HTN, diastolic CHF, CAD s/p stent x 2 (2011), HLD, DM Type II, BELEN on CPAP, thyroid cancer s/p partial thyroidectomy (1980's) with subsequent hypothyroidism is admitted with RUQ pain, nausea. Patient has a known hx of gall stones. Patient states that she started having RUQ pain on Sunday evening suddenly accompanied by nausea, vomiting. No immediate concern for cholangitis. Patient currently afebrile, LFTs normal. No stones visualized per US. Surgery has also been consulted who are planning to take the patient for a cholecystectomy with Dr. Roberson on 8/11     Patient states that she feels well. No acute events overnight. Been NPO since midnight.    /53  Pulse 62  Temp 96.9  F (36.1  C) (Oral)  Resp 18  Ht 1.689 m (5' 6.5\")  Wt 133.5 kg (294 lb 4.8 oz)  SpO2 94%  BMI 46.79 kg/m2    Constitutional: cooperative, pleasant, no acute distress  Eyes: Sclera anicteric  CV: RRR, distal heart sounds. No murmers, clicks  Respiratory: clear to auscultation bilaterally. No wheezes and crackles   Abd: Nondistended, +bs, no hepatosplenomegaly, tender to deep palpation. no peritoneal signs  Skin: warm, perfused, no jaundice  Neuro: AAO x 3  MSK: +1 BLE edema pitting     crt 1.22  LFTs WNL  amylase 129  Lipase 779     Assessment and Plan     Symptomatic cholelithiasis: patient with chronic cholelithiasis admitted with RUQ pain. No concern for cholangitis at present. LFTs normal.  - patient to undergo cholecystectomy on 8/11  - if surgery wishes for gastroenterology to be involved in order for an ERCP to be performed, would be happy to assist but at present no such indication. GI will sign off at this point. Please feel free to contact us with any questions or concerns.      Tray Maravilla MD  PGY 3, IM  Pager [phone]  "

## 2017-08-11 NOTE — OP NOTE
[facility], [place]      Operative Note    Date of Surgery: 8/11/2017      Pre-operative diagnosis: Gallstone pancreatitis      Post-operative diagnosis: Gallstone pancreatitis, umbilical hernia     Procedure: Laparoscopic cholecystectomy, primary umbilical hernia repair      Surgeon: Dr. Roberson     Assistants: Juan F Samuel, BRANDO Surgery Resident     Anesthesia:  General          Estimated blood loss: 25 cc     Drains: None     Specimens: Gallbladder        Findings: normal gallbladder. Critical view of safety established prior to division of cystic duct and artery. Umbilical hernia < 2 cm defect. Repaired primarily.     Complications: None       Indications: Mariel Ribeiro is a 72 year old woman with a history of CHF, COPD, and cholelithiasis who presented to the emergency room with gallstone pancreatitis. Cholecystectomy was recommended. Serial bilirubin remained normal during admission. She was consented for cholecystectomy.      Description: Patient was brought to the operating theater and placed in supine position with arms out. General anesthesia and endotracheal intubation was performed. The abdomen was prepped and draped in usual sterile fashion. Pause for the cause and pre operative antibiotics were administered.      An umbilical hernia with attenuated skin was noted on exam. An infraumbilical incision was made and the hernia sac was circumferentially dissected out. The sac was entered, a 0 vicryl U stitch was placed and the abdomen was safely entered under direct visualization. A 12 mm juan port was introduced into the abdomen. Pneumoperitoneum was established using carbon dioxide gas to a maximum pressure of 15 mmHg. A 10 mm 30 degree laparoscope was utilized. An additional 10 mm epigastric port was placed and two 5 mm ports were placed into the right upper quadrant under visualization.      The patient was moved into a steep reverse Trendelenberg position. Numerous adhesions  to the gallbladder were taken down with a combination of blunt and sharp dissection.     The gallbladder was grasped at its fundus and retracted cephalad.  It was also grasped at its infundibulum and retracted medially.        Dissection was performed laterally on the gallbladder and the peritoneum was divided along the lateral aspect of the gallbladder. The dissection then continued medially and the peritoneum was divided along the medial aspect of the gallbladder,  the posterior gallbladder wall from the fossa.  The cystic artery was  from the infundibulum of the gallbladder and the cystic duct.  Next, a complete dissection of the upper aspect of the triangle of Calot was performed. The cystic duct and artery were skeletonized and the critical view of safety was achieved.    All of this dissection was performed on the gallbladder wall and clearly above the triangle of Calot.     The cystic artery was clipped securely x3 and divided between clips. The cystic duct was dilated at its take off at the infundibulum. This precluded clipping. We therefore divided the infundibulum with a 45 mm blue load endo SHORTY stapler. The staple line was hemostatic; Dried crushed gallstone remnants were visualized on the cystic duct distal to our staple line.  The gallbladder was then removed out of its fossa using electrocautery.  It was placed into an Endocatch bag and removed from the abdomen.     Next, the gallbladder fossa was irrigated with saline and the saline was aspirated. Complete hemostasis was achieved.    The fascia of the 12 mm epigastric port was closed with 0 pds using the PMI suture passer.    The hernia sac was then excised with cautery and passed off the field. The fascial defect was less than 2 cm. We primary closed the defect with running 0 pds. The attenuated skin was excised and the umbilicus was reconstructed with 3-0 dermal vicryl sutures. It was then tacked to the fascia with an additional  3-0 vicryl. The skin was closed using 4-0 monocryl suture and dermabond was applied.     Dr. Roberson was present for all critical components of the operation and all needle and sponge counts were correct x2 at the end of the procedure.     Juan F Samuel MD  Surgery PGY-5  [phone]

## 2017-08-11 NOTE — PLAN OF CARE
Patient was NPO at midnight, except for Tylenol which was given for headache last dose given @ 0630 writer confirmed with OR that it was okay to have tylenol with small sip of water. Scrub with Charles Wipes and hair shampoo done at 0600. Surgery scheduled for 1040 this morning. Patient is alert and oriented x 4. Blood sugar 138 @ 0200 and 140 @ 0600 not requiring insulin. Patient refused insulin during evening shift. Patient has nonblanchable redness on bottom encouraging repositioning. Patient voiding adequately during the night. Continue to monitor with plan of care.

## 2017-08-11 NOTE — OR NURSING
Dr. Carrera again at bedside. Patient calm, still confused.  Patient otherwise stable. Will place sign out when able. Okay to send back to 5A.

## 2017-08-12 ENCOUNTER — APPOINTMENT (OUTPATIENT)
Dept: PHYSICAL THERAPY | Facility: CLINIC | Age: 72
DRG: 417 | End: 2017-08-12
Payer: MEDICARE

## 2017-08-12 VITALS
BODY MASS INDEX: 45.99 KG/M2 | SYSTOLIC BLOOD PRESSURE: 172 MMHG | HEIGHT: 67 IN | DIASTOLIC BLOOD PRESSURE: 52 MMHG | WEIGHT: 293 LBS | OXYGEN SATURATION: 94 % | RESPIRATION RATE: 16 BRPM | HEART RATE: 70 BPM | TEMPERATURE: 97.8 F

## 2017-08-12 LAB
ALBUMIN SERPL-MCNC: 3.3 G/DL (ref 3.4–5)
ALBUMIN UR-MCNC: NEGATIVE MG/DL
ALP SERPL-CCNC: 91 U/L (ref 40–150)
ALT SERPL W P-5'-P-CCNC: 46 U/L (ref 0–50)
ANION GAP SERPL CALCULATED.3IONS-SCNC: 7 MMOL/L (ref 3–14)
APPEARANCE UR: CLEAR
AST SERPL W P-5'-P-CCNC: 36 U/L (ref 0–45)
BILIRUB DIRECT SERPL-MCNC: 0.1 MG/DL (ref 0–0.2)
BILIRUB SERPL-MCNC: 0.5 MG/DL (ref 0.2–1.3)
BILIRUB UR QL STRIP: NEGATIVE
BUN SERPL-MCNC: 14 MG/DL (ref 7–30)
CALCIUM SERPL-MCNC: 9.5 MG/DL (ref 8.5–10.1)
CHLORIDE SERPL-SCNC: 109 MMOL/L (ref 94–109)
CO2 SERPL-SCNC: 26 MMOL/L (ref 20–32)
COLOR UR AUTO: YELLOW
CREAT SERPL-MCNC: 1.21 MG/DL (ref 0.52–1.04)
ERYTHROCYTE [DISTWIDTH] IN BLOOD BY AUTOMATED COUNT: 13.6 % (ref 10–15)
GFR SERPL CREATININE-BSD FRML MDRD: 44 ML/MIN/1.7M2
GLUCOSE BLDC GLUCOMTR-MCNC: 143 MG/DL (ref 70–99)
GLUCOSE BLDC GLUCOMTR-MCNC: 145 MG/DL (ref 70–99)
GLUCOSE BLDC GLUCOMTR-MCNC: 155 MG/DL (ref 70–99)
GLUCOSE SERPL-MCNC: 144 MG/DL (ref 70–99)
GLUCOSE UR STRIP-MCNC: NEGATIVE MG/DL
HCT VFR BLD AUTO: 38.2 % (ref 35–47)
HGB BLD-MCNC: 12.2 G/DL (ref 11.7–15.7)
HGB UR QL STRIP: ABNORMAL
KETONES UR STRIP-MCNC: NEGATIVE MG/DL
LEUKOCYTE ESTERASE UR QL STRIP: ABNORMAL
LIPASE SERPL-CCNC: 356 U/L (ref 73–393)
MAGNESIUM SERPL-MCNC: 1.7 MG/DL (ref 1.6–2.3)
MCH RBC QN AUTO: 26.8 PG (ref 26.5–33)
MCHC RBC AUTO-ENTMCNC: 31.9 G/DL (ref 31.5–36.5)
MCV RBC AUTO: 84 FL (ref 78–100)
NITRATE UR QL: NEGATIVE
PH UR STRIP: 5.5 PH (ref 5–7)
PHOSPHATE SERPL-MCNC: 3.1 MG/DL (ref 2.5–4.5)
PLATELET # BLD AUTO: 131 10E9/L (ref 150–450)
POTASSIUM SERPL-SCNC: 3.8 MMOL/L (ref 3.4–5.3)
PROT SERPL-MCNC: 6.6 G/DL (ref 6.8–8.8)
RBC # BLD AUTO: 4.56 10E12/L (ref 3.8–5.2)
RBC #/AREA URNS AUTO: 21 /HPF (ref 0–2)
SODIUM SERPL-SCNC: 142 MMOL/L (ref 133–144)
SP GR UR STRIP: 1.01 (ref 1–1.03)
SQUAMOUS #/AREA URNS AUTO: 1 /HPF (ref 0–1)
TRANS CELLS #/AREA URNS HPF: <1 /HPF (ref 0–1)
URN SPEC COLLECT METH UR: ABNORMAL
UROBILINOGEN UR STRIP-MCNC: NORMAL MG/DL (ref 0–2)
WBC # BLD AUTO: 7.4 10E9/L (ref 4–11)
WBC #/AREA URNS AUTO: 2 /HPF (ref 0–2)

## 2017-08-12 PROCEDURE — 81001 URINALYSIS AUTO W/SCOPE: CPT | Performed by: STUDENT IN AN ORGANIZED HEALTH CARE EDUCATION/TRAINING PROGRAM

## 2017-08-12 PROCEDURE — 97116 GAIT TRAINING THERAPY: CPT | Mod: GP

## 2017-08-12 PROCEDURE — A9270 NON-COVERED ITEM OR SERVICE: HCPCS | Mod: GY | Performed by: STUDENT IN AN ORGANIZED HEALTH CARE EDUCATION/TRAINING PROGRAM

## 2017-08-12 PROCEDURE — A9270 NON-COVERED ITEM OR SERVICE: HCPCS | Mod: GY | Performed by: INTERNAL MEDICINE

## 2017-08-12 PROCEDURE — 40000193 ZZH STATISTIC PT WARD VISIT

## 2017-08-12 PROCEDURE — 83735 ASSAY OF MAGNESIUM: CPT | Performed by: STUDENT IN AN ORGANIZED HEALTH CARE EDUCATION/TRAINING PROGRAM

## 2017-08-12 PROCEDURE — 25000132 ZZH RX MED GY IP 250 OP 250 PS 637: Mod: GY | Performed by: STUDENT IN AN ORGANIZED HEALTH CARE EDUCATION/TRAINING PROGRAM

## 2017-08-12 PROCEDURE — 25000132 ZZH RX MED GY IP 250 OP 250 PS 637: Mod: GY | Performed by: INTERNAL MEDICINE

## 2017-08-12 PROCEDURE — 83690 ASSAY OF LIPASE: CPT | Performed by: STUDENT IN AN ORGANIZED HEALTH CARE EDUCATION/TRAINING PROGRAM

## 2017-08-12 PROCEDURE — 00000146 ZZHCL STATISTIC GLUCOSE BY METER IP

## 2017-08-12 PROCEDURE — 40000133 ZZH STATISTIC OT WARD VISIT

## 2017-08-12 PROCEDURE — 82248 BILIRUBIN DIRECT: CPT | Performed by: STUDENT IN AN ORGANIZED HEALTH CARE EDUCATION/TRAINING PROGRAM

## 2017-08-12 PROCEDURE — 84100 ASSAY OF PHOSPHORUS: CPT | Performed by: STUDENT IN AN ORGANIZED HEALTH CARE EDUCATION/TRAINING PROGRAM

## 2017-08-12 PROCEDURE — 85027 COMPLETE CBC AUTOMATED: CPT | Performed by: STUDENT IN AN ORGANIZED HEALTH CARE EDUCATION/TRAINING PROGRAM

## 2017-08-12 PROCEDURE — 36415 COLL VENOUS BLD VENIPUNCTURE: CPT | Performed by: STUDENT IN AN ORGANIZED HEALTH CARE EDUCATION/TRAINING PROGRAM

## 2017-08-12 PROCEDURE — 80053 COMPREHEN METABOLIC PANEL: CPT | Performed by: STUDENT IN AN ORGANIZED HEALTH CARE EDUCATION/TRAINING PROGRAM

## 2017-08-12 PROCEDURE — 97530 THERAPEUTIC ACTIVITIES: CPT | Mod: GP

## 2017-08-12 RX ORDER — NYSTATIN AND TRIAMCINOLONE ACETONIDE 100000; 1 [USP'U]/G; MG/G
CREAM TOPICAL 2 TIMES DAILY
Status: DISCONTINUED | OUTPATIENT
Start: 2017-08-12 | End: 2017-08-12 | Stop reason: HOSPADM

## 2017-08-12 RX ADMIN — LEVOTHYROXINE SODIUM 175 MCG: 25 TABLET ORAL at 08:26

## 2017-08-12 RX ADMIN — POLYETHYLENE GLYCOL 3350 17 G: 17 POWDER, FOR SOLUTION ORAL at 08:29

## 2017-08-12 RX ADMIN — ESCITALOPRAM OXALATE 20 MG: 20 TABLET ORAL at 08:26

## 2017-08-12 RX ADMIN — FUROSEMIDE 80 MG: 40 TABLET ORAL at 08:26

## 2017-08-12 RX ADMIN — ACETAMINOPHEN 650 MG: 325 TABLET, FILM COATED ORAL at 06:00

## 2017-08-12 RX ADMIN — POTASSIUM CHLORIDE 20 MEQ: 750 TABLET, EXTENDED RELEASE ORAL at 08:27

## 2017-08-12 RX ADMIN — ALBUTEROL SULFATE 2 PUFF: 90 AEROSOL, METERED RESPIRATORY (INHALATION) at 08:25

## 2017-08-12 RX ADMIN — PREGABALIN 100 MG: 50 CAPSULE ORAL at 08:27

## 2017-08-12 RX ADMIN — GUAIFENESIN 600 MG: 600 TABLET, EXTENDED RELEASE ORAL at 08:27

## 2017-08-12 NOTE — PLAN OF CARE
Problem: Goal Outcome Summary  Goal: Goal Outcome Summary  Outcome: No Change  Pt arrived to unit from PACU at 1645. Lap choley completed, 4 lap sites intact. A&Ox4, VSS on 3L NC and capnography, ex bp, team notified. Some discomfort at lap sites, scheduled Tylenol q8h with some relief. PRN dilaudid available. BGs 163 and 181, pt refusing insulin. Up with assist x1 to bathroom, voiding, no BM. Small amount of blood when wiping, no obvious sx of bleeding, MD paged. On full liquid diet, nursing to advance as tolerated. Plan for discharge home tomorrow. Will continue to monitor and follow POC.

## 2017-08-12 NOTE — PLAN OF CARE
Problem: Goal Outcome Summary  Goal: Goal Outcome Summary  Outcome: Adequate for Discharge Date Met:  08/12/17  Vitals stable with HTN noted - on-call surgery resident notified of elevated BPs and instructing patient to f/u with Primary MD. Pt reporting burning and discomfort with urination - UA/UC sent. MD also ordered anti-fungal cream for patient's groin area. PIV removed by NST. Pt ambulating with cane independently in room and walked with PT. OT paged to inquire about lymph wraps but did not get response and patient unwilling to postpone D/C. Lap sites CDI with liquid bandage and dressing mid-abdomen. Discharge instructions and post-op education completed with patient and roommate who both verbalized understanding and had no further questions. Roommate assisted patient to pack belongings. Transport took patient via WC to lobby for D/C to home.

## 2017-08-12 NOTE — PROGRESS NOTES
General Surgery Progress Note    POD#1    S:  No acute events overnight. Pt seen at bedside resting comfortably. Does complain of mild RUQ pain. Patient is urinating and drinking liquids.  Has not had a BM    O:  Temp:  [96.9  F (36.1  C)-99.5  F (37.5  C)] 99.3  F (37.4  C)  Pulse:  [52-62] 59  Heart Rate:  [58-67] 65  Resp:  [15-20] 15  BP: (133-180)/(38-81) 157/42  SpO2:  [94 %-100 %] 95 %    I/O last 3 completed shifts:  In: 2335 [P.O.:620; I.V.:1715]  Out: 175 [Urine:150; Blood:25]    Gen: A&Ox3, NAD  Resp: non-labored breathing on 3L of O2.   CV: RRR  Abd: Soft, Non-distended, RUQ tenderness, +BS  Ext: warm and well perfused***, pulses intact, ROM intact  Incisions: port sites x4 c/d/i  : Urinating without pain or complications      Labs:  Lab Results   Component Value Date    WBC 5.5 08/11/2017     Lab Results   Component Value Date    RBC 4.62 08/11/2017     Lab Results   Component Value Date    HGB 12.4 08/11/2017     Lab Results   Component Value Date    HCT 38.4 08/11/2017     No components found for: MCT  Lab Results   Component Value Date    MCV 83 08/11/2017     Lab Results   Component Value Date    MCH 26.8 08/11/2017     Lab Results   Component Value Date    MCHC 32.3 08/11/2017     Lab Results   Component Value Date    RDW 13.4 08/11/2017     Lab Results   Component Value Date     08/11/2017            A/P: Mariel Ribeiro is a 72 year old female POD# 1 s/p Laparoscopic Cholecystectomy 2/2 gallstone pancreatitis. She has a prior medical history of pancreatitis, pancreatic cysts, CHF and HTN.  - Neuro/Pain: Mild RUQ tenderness, pain controlled with Tylenol and Dilauded PRN.  - Resp: Patient currently on 3L of O2. Encouraged Incentive Spirometry.  - GI/FEN: No BM, Full Liquid Diet  - Patient encouraged to ambulate   - Possible discharge 8/13 or 8/14     Lazaro Deleon MS-3 acting as scribe.  Patient seen and discussed with Dr. Samuel, Chief Resident

## 2017-08-12 NOTE — PROGRESS NOTES
[facility] Surgery Progress Note    S:  No acute events overnight. Pt seen at bedside resting comfortably. Patient on 3L NC. Reports mild RUQ pain. No nausea/emesis. Voiding independently. Denies SOB, angina, fever. Ambulating.       O:  Temp:  [97.1  F (36.2  C)-99.5  F (37.5  C)] 99.3  F (37.4  C)  Pulse:  [52-59] 59  Heart Rate:  [58-67] 65  Resp:  [15-20] 15  BP: (133-180)/(38-81) 157/42  SpO2:  [95 %-100 %] 95 %    I/O last 3 completed shifts:  In: 2335 [P.O.:620; I.V.:1715]  Out: 175 [Urine:150; Blood:25]    Gen: A&Ox3, NAD  Resp: non-labored breathing on 3L NC  Abd: Soft, Non-distended, Non-tender, No rebound or guarding.   Ext: warm and well perfused, pulses intact, ROM intact  Incisions: umbilical, RUQ incisions c/d/i     Labs:  Complete Blood Count     Recent Labs  Lab 08/11/17  0922 08/10/17  0731 08/08/17  0720 08/07/17  1737   WBC 5.5 6.0 9.1 10.9   HGB 12.4 11.7 12.4 13.3   * 105* 106* 120*     Basic Metabolic Panel    Recent Labs  Lab 08/11/17  0922 08/10/17  0731 08/08/17  0720 08/07/17  1737    143 140 142   POTASSIUM 3.7 3.3* 3.4 3.5   CHLORIDE 106 106 106 103   CO2 28 28 27 26   BUN 21 20 23 25   CR 1.22* 1.17* 1.24* 1.28*   * 123* 113* 142*     Liver Function Tests    Recent Labs  Lab 08/11/17  0922 08/10/17  0731 08/08/17  0720 08/07/17  1737   AST 17 15 18 23   ALT 22 22 23 25   ALKPHOS 96 92 88 112   BILITOTAL 0.4 0.4 0.6 0.4   ALBUMIN 3.2* 3.0* 3.1* 3.4   INR  --   --  1.12  --      Pancreatic Enzymes    Recent Labs  Lab 08/11/17  0922 08/10/17  0731 08/09/17  0751 08/07/17  1737   LIPASE 779* 846*  --  924*   AMYLASE 129* 135* 136*  --      Coagulation Profile    Recent Labs  Lab 08/08/17  0720   INR 1.12              A/P: Mariel Ribeiro is a 72 year old female w/ hx of CHF, COPD, cholelithiasis with gallstone pancreatitis POD# 1 s/p laparoscopic cholecystectomy and umbilical hernia.     Pulm:      -encourage IS  FEN/GI:      -Diet: ADAT    Dispo: floor, discharge to Home  pending pt doing well off 3L NC.     Patient seen and discussed with chief resident, Dr. Samuel.     Clark Jackson MD  Surgery PGY-1  [phone]

## 2017-08-12 NOTE — PLAN OF CARE
Problem: Goal Outcome Summary  Goal: Goal Outcome Summary  Pt a/o x 4, on capnography from lap jass yesterday. On scheduled tylenol to manage pain. Pt voiding adequately. Pt on 3 L of O2. Pt up assist of 2. BG checked at 0200. Will continue to monitor/ POC.

## 2017-08-12 NOTE — DISCHARGE SUMMARY
"SURGERY DISCHARGE SUMMARY  Patient Name: Mariel Ribeiro  MR#: [id number]  Date of Admission: 8/7/2017  4:52 PM  Date of Discharge: 8/12/2017  Operating Physician: Dr. Roberson  Discharging Physician: same    Discharge Diagnoses:  Gallstone pancreatitis  Cholelithiasis     Procedures Performed this admission:  Laparoscopic cholecystectomy    Consultations:  GI    Brief HPI:  Mariel Ribeiro is a 72 year old woman with a history of CHF, COPD, and cholelithiasis who presented to the emergency room with gallstone pancreatitis. Cholecystectomy was recommended. Serial bilirubin remained normal during admission. She was consented for cholecystectomy.     Hospital Course:   Mariel Ribeiro was admitted for medical management of pancreatitis. After her pancreatitis improved, she underwent surgery. The patient was transferred to the general floor for postoperative recovery. Cardiopulmonary and renal status remained stable throughout the admission. Diet was advanced and patient achieved full return of urinary function. Repeat hepatic panel showed normal bilirubin and lipase on POD#1. She was discharged home without complication.    Pathology:  Pending     Discharge Exam:  /52 (BP Location: Left arm)  Pulse 70  Temp 97.8  F (36.6  C) (Oral)  Resp 16  Ht 1.689 m (5' 6.5\")  Wt 133.5 kg (294 lb 4.8 oz)  SpO2 94%  BMI 46.79 kg/m2.  General: Alert, in no acute distress.   HEENT: Normocephalic, atraumatic.   Respiratory: Breathing comfortably.  Cardiovascular: Regular rate and rhythm.   Gastrointestinal: Abdomen soft, non-distended, non tender   Extremities: Moving all four extremities.   Skin: No rashes or lesions appreciated.   Incisions: c/d/i     Medications on Discharge:      Review of your medicines      START taking       Dose / Directions    HYDROmorphone 2 MG tablet   Commonly known as:  DILAUDID   Used for:  Acute pancreatitis, unspecified complication status, unspecified pancreatitis type        Dose:  2 mg "   Take 1 tablet (2 mg) by mouth every 4 hours as needed for pain   Quantity:  20 tablet   Refills:  0         CONTINUE these medicines which have NOT CHANGED       Dose / Directions    albuterol 108 (90 BASE) MCG/ACT Inhaler   Commonly known as:  albuterol   Used for:  Chronic obstructive pulmonary disease with acute exacerbation (H)        INHALE 1 TO 2 PUFFS EVERY 4 TO 6 HOURS AS NEEDED   Quantity:  1 Inhaler   Refills:  PRN       aspirin  MG EC tablet        Dose:  325 mg   Take 1 tablet by mouth daily.   Quantity:  30 tablet   Refills:  0       atorvastatin 40 MG tablet   Commonly known as:  LIPITOR   Used for:  Hyperlipidemia with target LDL less than 70        TAKE 1 TABLET(40 MG) BY MOUTH DAILY   Quantity:  90 tablet   Refills:  3       blood glucose monitoring test strip   Commonly known as:  no brand specified        Dose:  1 strip   1 strip by In Vitro route 2 times daily Strips per patient's preference   Quantity:  200 each   Refills:  3       EPINEPHrine 0.3 MG/0.3ML injection 2-pack   Commonly known as:  EPIPEN/ADRENACLICK/or ANY BX GENERIC EQUIV   Used for:  Allergic reaction, subsequent encounter        Dose:  0.3 mg   Inject 0.3 mLs (0.3 mg) into the muscle once as needed for anaphylaxis   Quantity:  0.6 mL   Refills:  0       escitalopram 20 MG tablet   Commonly known as:  LEXAPRO   Used for:  Major depressive disorder, single episode, in partial remission (H)        Dose:  20 mg   Take 1 tablet (20 mg) by mouth every morning   Quantity:  90 tablet   Refills:  3       febuxostat 40 MG Tabs tablet   Commonly known as:  ULORIC   Used for:  Hyperuricemia        Dose:  40 mg   Take 1 tablet (40 mg) by mouth every evening   Quantity:  90 tablet   Refills:  3       folic acid 800 MCG Tabs        Dose:  800 mcg   Take 800 mcg by mouth daily.   Refills:  0       furosemide 40 MG tablet   Commonly known as:  LASIX   Used for:  Chronic diastolic heart failure (H)        Dose:  80 mg   Take 80 mg by  mouth 2 times daily   Quantity:  120 tablet   Refills:  6       glimepiride 4 MG tablet   Commonly known as:  AMARYL   Used for:  Type 2 diabetes mellitus with chronic kidney disease, without long-term current use of insulin, unspecified CKD stage (H)        TAKE 1 TABLET BY MOUTH TWICE DAILY(WITH MEALS)   Quantity:  180 tablet   Refills:  0       GUAIFENESIN  MG Tbcr        Take 600 mg by mouth twice daily   Quantity:  30   Refills:  0       levothyroxine 175 MCG tablet   Commonly known as:  SYNTHROID/LEVOTHROID   Used for:  Hypothyroidism, unspecified type        TAKE 1 TABLET(175 MCG) BY MOUTH DAILY. REPEAT LABS IN 4-6 WEEKS   Quantity:  90 tablet   Refills:  0       A&E Complete Home ServicesCAN FINEPOINT LANCETS Misc   Used for:  Type 2 diabetes, HbA1C goal < 8% (H)        Use to test blood sugars 2 times daily or as directed.   Quantity:  100 each   Refills:  prn       metFORMIN 500 MG 24 hr tablet   Commonly known as:  GLUCOPHAGE-XR        Dose:  500 mg   Take 500 mg by mouth 2 times daily (with meals) Reported on 5/23/2017   Refills:  0       Miconazole Nitrate 2 % Powd   Used for:  Intertrigo        Apply to rash on chest three times daily.  Continue for one week after the rash has resolved.   Quantity:  100 g   Refills:  3       niacin 500 MG CR tablet   Commonly known as:  NIASPAN   Used for:  Hyperlipidemia with target LDL less than 70        TAKE 1 TABLET(500 MG) BY MOUTH TWICE DAILY   Quantity:  180 tablet   Refills:  1       nitroGLYcerin 0.4 MG sublingual tablet   Commonly known as:  NITROSTAT   Used for:  Coronary atherosclerosis of unspecified type of vessel, native or graft        Dose:  0.4 mg   Place 1 tablet (0.4 mg) under the tongue every 5 minutes as needed for chest pain As needed for chest pain.   Quantity:  25 tablet   Refills:  PRN       ONE TOUCH ULTRA (DEVICES) Misc   Used for:  Type II or unspecified type diabetes mellitus without mention of complication, not stated as uncontrolled        test blood  sugar BID   Quantity:  1   Refills:  0       POTASSIUM CHLORIDE PO        Dose:  10 mEq   Take 10 mEq by mouth 2 times daily   Refills:  0       pregabalin 100 MG capsule   Commonly known as:  LYRICA   Used for:  Myalgia        Dose:  100 mg   Take 1 capsule (100 mg) by mouth 2 times daily   Quantity:  180 capsule   Refills:  2       senna-docusate 8.6-50 MG per tablet   Commonly known as:  SENOKOT-S;PERICOLACE   Used for:  Constipation        Dose:  1-2 tablet   Take 1-2 tablets by mouth 2 times daily as needed for constipation   Quantity:  60 tablet   Refills:  0       sitagliptin 50 MG tablet   Commonly known as:  JANUVIA   Used for:  Type 2 diabetes mellitus with chronic kidney disease, without long-term current use of insulin, unspecified CKD stage (H)        Dose:  50 mg   Take 1 tablet (50 mg) by mouth daily   Quantity:  90 tablet   Refills:  3       TIZANIDINE HCL PO        Dose:  4 mg   Take 4 mg by mouth At Bedtime   Refills:  0       traZODone 50 MG tablet   Commonly known as:  DESYREL   Used for:  Insomnia, unspecified        Dose:  150 mg   Take 3 tablets (150 mg) by mouth At Bedtime   Quantity:  90 tablet   Refills:  7       vitamin D3 5000 UNIT/ML Liqd   Indication:  Liquid gel caps        Dose:  32128 Units   Take 10,000 Units by mouth daily   Refills:  0            Where to get your medicines      Some of these will need a paper prescription and others can be bought over the counter. Ask your nurse if you have questions.     Bring a paper prescription for each of these medications      HYDROmorphone 2 MG tablet             Discharge Procedure Orders  Medication Therapy Management Referral   Referral Type: Med Therapy Management     Reason for your hospital stay   Order Comments: Acute pancreatitis, unspecified complication status, unspecified pancreatitis type     Activity   Order Comments: Your activity upon discharge: activity as tolerated.   If you've recently had laparoscopic surgery, do not  lift anything > 10 lbs for 2-3 weeks.  If you've recently had open surgery, do not lift anything > 10 lbs for 6-8 weeks.   Order Specific Question Answer Comments   Is discharge order? Yes      Discharge Instructions   Order Comments: May start general diet immediately.  If you've had laparoscopic surgery, no lifting >10 lbs for 6-8 weeks.     No rigorous physical activity. No other activity restrictions.  May shower. Allow soapy water to run over incision. Rinse with clean water. Never scrub your incision. Pat area dry.   No submersion in water (lake/pool/tub) for 2 weeks or until approved by your surgeon   Remove dressing in 48 hours. You have steri-strips or dermabond over your incisions that will off in 10-14 days.     No driving, no using heavy machinery and no major decision-making while taking narcotic pain medication. It is illegal to drive while taking narcotic pain medication. Narcotics can cause constipation. Take stool softener while taking narcotic pain medication. If no bowel movement for 2 days add a laxative to aid in bowel movement. You can obtain a laxative from your pharmacyst over the counter.     When taking narcotics take tylenol as scheduled. This will decrease the amount of narcotics required. When narcotics are no longer required for pain control, stop taking scheduled tylenol and take it as needed.    Please call if you experience increasing abdominal pain, nausea, vomiting, increasing drainage from your wounds, chills, or fever >101.5    Call [phone] for questions and to discuss appointment.     Call [phone] and ask to speak with surgery resident if you are having troubles in the evenings, at night, or on weekends.     [facility] Follow-up and recommended labs and tests   Order Comments: Follow up with Dr. Roberson , at [facility] Surgery clinic, as needed  Follow up with your PCP on Monday for evaluation of blood pressure.     Full Code     Diet   Order Comments: Regular diet    Order Specific Question Answer Comments   Is discharge order? Yes

## 2017-08-12 NOTE — PLAN OF CARE
Problem: Goal Outcome Summary  Goal: Goal Outcome Summary  PT: Pt amb with SEC on ' and 100' progressing from CGA to SBA. Pt is staedy with walking although guarded due to pain. Pt asc/desc 6 stairs with 1 rail and cane progressing from min A to SBA. Pt has strong B rails at home and only 2 stairs to enter, doing well overall. Pt sats on RA 92-93% after gait, mild SOB but marysol well on RA. Recommend discharge to home with A from roommate.

## 2017-08-14 ENCOUNTER — CARE COORDINATION (OUTPATIENT)
Dept: CARE COORDINATION | Facility: CLINIC | Age: 72
End: 2017-08-14

## 2017-08-14 ENCOUNTER — TELEPHONE (OUTPATIENT)
Dept: FAMILY MEDICINE | Facility: CLINIC | Age: 72
End: 2017-08-14

## 2017-08-14 ENCOUNTER — CARE COORDINATION (OUTPATIENT)
Dept: SURGERY | Facility: CLINIC | Age: 72
End: 2017-08-14

## 2017-08-14 ENCOUNTER — TELEPHONE (OUTPATIENT)
Dept: PHARMACY | Facility: OTHER | Age: 72
End: 2017-08-14

## 2017-08-14 LAB
BACTERIA SPEC CULT: NO GROWTH
BACTERIA SPEC CULT: NO GROWTH
MICRO REPORT STATUS: NORMAL
MICRO REPORT STATUS: NORMAL
SPECIMEN SOURCE: NORMAL
SPECIMEN SOURCE: NORMAL

## 2017-08-14 NOTE — PROGRESS NOTES
Mariel Ribeiro is a patient of Dr. Jeffrey Roberson that underwent a laparoscopic cholecystectomy and primary repair of umbilical hernia approximately 3 days ago (8/11).  Attempted to contact patient via telephone for a status update and review post op teaching.  LM on  to call office.  Await return call.      Of note:  Pathology: Pending  Appointment:  Dr. Roberson wanted to see patient when he returned from [facility] - appointment arranged 9/14/17 at 2:00 PM  Incisions:  Dermabond  New medications:  Dilaudid  Restrictions: Routine

## 2017-08-14 NOTE — PROGRESS NOTES
This patient was seen at or by General Surgery Department and Care Coordinators from that department will handle this patient's post discharge follow up          Adult [facility] Follow-up and recommended labs and tests   Order Comments: Follow up with Dr. Robesron , at [facility] Surgery clinic, as needed

## 2017-08-14 NOTE — TELEPHONE ENCOUNTER
MTM referral from: Transitions of Care (recent hospital discharge or ED visit)    MTM referral outreach attempt #1 on August 14, 2017 at 3:21 PM      Outcome: Left Message    Marysol Barbour MTM Coordinator

## 2017-08-14 NOTE — LETTER
8/17/2017           TO: Mariel Ribeiro  [address] [place] [address]           Dear Mariel,  This letter serves as a confirmation of what we discussed in our recent phone conversation.      Appointment date September 14, 2017   Appointment time 2:00 PM     Provider Jeffrey Roberson MD PhD   [facility] SURGERY  [address] [address]  Phone: [phone]  Fax: [phone]     Thank you for entrusting your care to [facility].    Sincerely,  Lore Monson   Phone Number: [phone], #[phone]

## 2017-08-14 NOTE — TELEPHONE ENCOUNTER
Please call for In Patient follow up  Acute Pancreatitis, Unspecified Complication Status, Unspecified Pancreatitis Type

## 2017-08-15 LAB — COPATH REPORT: NORMAL

## 2017-08-17 NOTE — PROGRESS NOTES
RN Post-Op/Post-Discharge Care Coordination Note    Ms. Mariel Palmer is a 72 year old female who underwent laparoscopic cholecystectomy and primary repair of umbilical hernia with  Dr. Adele Ballard.  Spoke with Patient.    Support  Patient able to care for self independently     Health Status  Fevers/chills: Patient denies any fever or chills.  Nausea/Vomiting: Patient denies nausea/vomiting.  Eating/drinking: Patient is able to eat and drink without any complaints.  Bowel habits: Reports bowel are returning to normal- still a little loose. Patient was asked to hold stool softener and restart if needed.  Urinary: Voiding normally  Drains (CEFERINO): N/A  Incisions: Patient denies any signs and symptoms of infection.  Dermabond intact.  Pain: Well managed with Tylenol.  Patient states she did not  narcotic pain medication/declined need for it.  New Medications:  Declined narcotics    Activity/Restrictions  No lifting in excess of 15-20 pounds for 3-4 weeks    Equipment  None    Pathology reviewed:  Yes  Copath Report 08/11/2017  2:05 PM 88   Patient Name: MARIEL PALMER   MR#: [id number]   Specimen #: [id number]   Collected: 8/11/2017   Received: 8/11/2017   Reported: 8/15/2017 15:15   Ordering Phy(s): ADELE BALLARD     For improved result formatting, select 'View Enhanced Report Format'   under Linked Documents section.     SPECIMEN(S):   Gallbladder and contents     FINAL DIAGNOSIS:   GALLBLADDER, CHOLECYSTECTOMY:   - Chronic cholecystitis with cholelithiasis     All of her questions were answered including reviewing restrictions and wound care.  She will call this office if she has any further questions and/or concerns.      Post op appointment confirmed for 9/14 at 2 PM with Dr. Ballard    Whom and When to Call  Patient acknowledges understanding of how to manage any medication changes and   when to seek medical care.     Patient advised that if after hour medical concerns arise to please call  [phone] and choose option 4 to speak to the physician on call.

## 2017-08-21 ENCOUNTER — ALLIED HEALTH/NURSE VISIT (OUTPATIENT)
Dept: PHARMACY | Facility: CLINIC | Age: 72
End: 2017-08-21
Payer: COMMERCIAL

## 2017-08-21 DIAGNOSIS — Z90.49 S/P CHOLECYSTECTOMY: Primary | ICD-10-CM

## 2017-08-21 DIAGNOSIS — E66.09 NON MORBID OBESITY DUE TO EXCESS CALORIES: ICD-10-CM

## 2017-08-21 PROCEDURE — 99605 MTMS BY PHARM NP 15 MIN: CPT | Mod: U4 | Performed by: PHARMACIST

## 2017-08-21 PROCEDURE — 99607 MTMS BY PHARM ADDL 15 MIN: CPT | Mod: U4 | Performed by: PHARMACIST

## 2017-08-21 NOTE — PROGRESS NOTES
SUBJECTIVE/OBJECTIVE:                Mariel Ribeiro is a 72 year old female called for a transitions of care visit.  She was discharged from [facility] on 8-12-17 for Gallbladder removal .     Chief Complaint:  Stomach hurts in 5 different places --slowly getting better , biggest problem is coughing --feels like ripping out the stitches.     She'd like me to review and update her epic med list today and add lisinopril to her list .         .  Personal Healthcare Goals: losing weight --200lbs. (she is 294lbs today ).     Allergies/ADRs: Reviewed in Epic-see CC above.   Tobacco: No tobacco use     Alcohol: not currently using  Caffeine: no caffeine  Activity: not much --moves slowly   PMH: Reviewed in Epic    Medication Adherence: no issues reported and sets up own med boxes    Cholecystectomy: went to ER -8-7-17 --lipase enzyme =900, she originally thought withdrawal from morphine.      refused dilaudid rx from discharge  , she only took tylenol 650mg -- 1 every 4-6 hours.    She had been on morphine chronically but had stopped this 1 week before the surgery. She feels much better --she can control her emotions/self without the medication.       Obesity;  Lymphedema,  Now down to 294 lbs. , has a goal weight of 200lbs. , she is doing it with diet (eating much more healthy)and exercise(has a new sit down machine [employer] ) --but may be interested in trying a pill--do we have one ?           Current labs include:  BP Readings from Last 3 Encounters:   08/12/17 172/52   06/01/17 144/71   05/25/17 143/78     Today's Vitals: There were no vitals taken for this visit.  Lab Results   Component Value Date    A1C 7.3 08/09/2017   .  Lab Results   Component Value Date    CHOL 186 03/08/2017     Lab Results   Component Value Date    TRIG 237 03/08/2017     Lab Results   Component Value Date    HDL 50 03/08/2017     Lab Results   Component Value Date    LDL 89 03/08/2017       Liver Function Studies -   Recent Labs   Lab Test   08/12/17   0857   PROTTOTAL  6.6*   ALBUMIN  3.3*   BILITOTAL  0.5   ALKPHOS  91   AST  36   ALT  46       Lab Results   Component Value Date    UCRR 104 03/08/2017    MICROL 20 03/08/2017    UMALCR 19.33 03/08/2017       Last Basic Metabolic Panel:  Lab Results   Component Value Date     08/12/2017      Lab Results   Component Value Date    POTASSIUM 3.8 08/12/2017     Lab Results   Component Value Date    CHLORIDE 109 08/12/2017     Lab Results   Component Value Date    BUN 14 08/12/2017     Lab Results   Component Value Date    CR 1.21 08/12/2017     GFR Estimate   Date Value Ref Range Status   08/12/2017 44 (L) >60 mL/min/1.7m2 Final     Comment:     Non  GFR Calc   08/11/2017 43 (L) >60 mL/min/1.7m2 Final     Comment:     Non  GFR Calc   08/10/2017 46 (L) >60 mL/min/1.7m2 Final     Comment:     Non  GFR Calc     GFR Estimate If Black   Date Value Ref Range Status   08/12/2017 53 (L) >60 mL/min/1.7m2 Final     Comment:      GFR Calc   08/11/2017 53 (L) >60 mL/min/1.7m2 Final     Comment:      GFR Calc   08/10/2017 55 (L) >60 mL/min/1.7m2 Final     Comment:      GFR Calc     TSH   Date Value Ref Range Status   05/18/2017 0.25 (L) 0.40 - 4.00 mU/L Final   ]    Most Recent Immunizations   Administered Date(s) Administered     Influenza (High Dose) 3 valent vaccine 01/27/2016     Influenza (IIV3) 01/10/2013     Pneumococcal (PCV 13) 03/09/2016     Pneumococcal 23 valent 01/22/2014     TDAP Vaccine (Adacel) 07/07/2008       ASSESSMENT:                 Current medications were reviewed today.        Medication Adherence: no issues identified    Cholecystectomy:  Healing well --some pain still in stomach but tylenol controlling it .     Obesity :  Diet and exercise helps , she may be interested in a medication -- I gave her info - on topiramate --she will think abut it . She has a weight goal of 200lbs. --working at it  !           PLAN:                Post Discharge Medication Reconciliation Status: discharge medications reconciled, continue medications without change.    Dear Mariel,     It was a pleasure talking with you today regarding your medications and medical concerns. The following is a summary of what we discussed.     1. FYI-- I  Reconciled your medication list today and took off the hydromorphone that you never took.  Congratulations for weaning off the Morphine 100% now.     2.  If you plateau on weight loss --lets consider using a pill called Topiramate (Topamax) along with diet and exercise.     3. FYi--Lets recheck your blood pressure at next office visit -- Goal BP is <140/90mmhg.       Follow-up : as needed with Dr. William , see me if needed for weight loss if needed.   I spent 40 minutes on the phone with her today .     .  All changes were made via collaborative practice agreement with Rafaela William. A copy of the visit note was provided to the patient's primary care provider.        The patient was sent via [employer] a summary of these recommendations as an after visit summary.    Karen Hilton Rph.  Medication Therapy Management Provider  [phone]

## 2017-08-21 NOTE — PATIENT INSTRUCTIONS
Dear Mariel,     It was a pleasure talking with you today regarding your medications and medical concerns. The following is a summary of what we discussed.     1. FYI-- I  Reconciled your medication list today and took off the hydromorphone that you never took.  Congratulations for weaning off the Morphine 100% now.     2.  If you plateau on weight loss --lets consider using a pill called Topiramate (Topamax) along with diet and exercise.     3. FYi--Lets recheck your blood pressure at next office visit -- Goal BP is <140/90mmhg.       Follow-up : as needed with Dr. William , see me if needed for weight loss if needed.     Thank you for your time and please feel free to call ([phone] voicemail/pager or [phone] clinic main line) or e-mail [email or web address]) with any questions.     Karen Hilton Rph.  Medication Therapy Management Provider  [phone]

## 2017-08-21 NOTE — Clinical Note
angelina--she had successful gallbladder removal surgery - healing slowly --her next goal now that she is 100% off all morphine is to lose 94 lbs to get down to 200lbs. I have a med suggestion for her (topirmate ) if she wants to try in the future?  Thanks , Karen Hilton, [facility]. Medication Therapy Management Provider [phone]

## 2017-08-21 NOTE — MR AVS SNAPSHOT
After Visit Summary   8/21/2017    Mariel Ribeiro    MRN: [id number]           Patient Information     Date Of Birth          1945        Visit Information        Provider Department      8/21/2017 1:00 PM Karen Hilton RPH [facility] MTM        Today's Diagnoses     S/P cholecystectomy    -  1    Non morbid obesity due to excess calories          Care Instructions    Dear Mariel,     It was a pleasure talking with you today regarding your medications and medical concerns. The following is a summary of what we discussed.     1. FYI-- I  Reconciled your medication list today and took off the hydromorphone that you never took.  Congratulations for weaning off the Morphine 100% now.     2.  If you plateau on weight loss --lets consider using a pill called Topiramate (Topamax) along with diet and exercise.     3. FYi--Lets recheck your blood pressure at next office visit -- Goal BP is <140/90mmhg.       Follow-up : as needed with Dr. William , see me if needed for weight loss if needed.     Thank you for your time and please feel free to call ([phone] voicemail/pager or [phone] clinic main line) or e-mail [email or web address]) with any questions.     Karen Hilton [facility].  Medication Therapy Management Provider  [phone]              Follow-ups after your visit        Your next 10 appointments already scheduled     Aug 23, 2017 10:40 AM CDT   Office Visit with Rafaela William MD   [facility]    [address] [place] [id number] [phone]           Bring a current list of meds and any records pertaining to this visit. For Physicals, please bring immunization records and any forms needing to be filled out. Please arrive 10 minutes early to complete paperwork.            Sep 14, 2017  2:00 PM CDT   (Arrive by 1:45 PM)   Post-Op with Jeffrey Roberson MD   [facility] ([facility])    [address]  4th Floor  [facility] [address] [phone]            Nov 15, 2017 11:30 AM CST   SHORT with Karen Hilton RPH   [facility] ([facility])    [address]  [place] [address] [phone]              Who to contact     If you have questions or need follow up information about today's clinic visit or your schedule please contact [facility] directly at [phone].  Normal or non-critical lab and imaging results will be communicated to you by Qzzrhart, letter or phone within 4 business days after the clinic has received the results. If you do not hear from us within 7 days, please contact the clinic through Qzzrhart or phone. If you have a critical or abnormal lab result, we will notify you by phone as soon as possible.  Submit refill requests through [employer] or call your pharmacy and they will forward the refill request to us. Please allow 3 business days for your refill to be completed.          Additional Information About Your Visit        MyChart Information     [employer] gives you secure access to your electronic health record. If you see a primary care provider, you can also send messages to your care team and make appointments. If you have questions, please call your primary care clinic.  If you do not have a primary care provider, please call [phone] and they will assist you.        Care EveryWhere ID     This is your Care EveryWhere ID. This could be used by other organizations to access your [facility] medical records  [id number]         Blood Pressure from Last 3 Encounters:   08/12/17 172/52   06/01/17 144/71   05/25/17 143/78    Weight from Last 3 Encounters:   08/10/17 294 lb 4.8 oz (133.5 kg)   06/01/17 (!) 305 lb (138.3 kg)   05/25/17 (!) 304 lb (137.9 kg)              Today, you had the following     No orders found for display       Primary Care Provider  Office Phone # Fax #    Rafaela William [phone] [address]  [place] [address]        Equal Access to Services     KJ SALVADOR : Hadii aad ku hadpapisha Donapasha, wajamesda luqdave, qaybta kaalmada brandon, gopal winkler mingogladys christensenabran bird. So [facility] [phone].    ATENCIÓN: Si habla español, tiene a gillespie disposición servicios gratuitos de asistencia lingüística. Llame al [phone].    We comply with applicable federal civil rights laws and [place] laws. We do not discriminate on the basis of race, color, national origin, age, disability sex, sexual orientation or gender identity.            Thank you!     Thank you for choosing [facility]  for your care. Our goal is always to provide you with excellent care. Hearing back from our patients is one way we can continue to improve our services. Please take a few minutes to complete the written survey that you may receive in the mail after your visit with us. Thank you!             Your Updated Medication List - Protect others around you: Learn how to safely use, store and throw away your medicines at www.disposemymeds.org.          This list is accurate as of: 8/21/17  1:42 PM.  Always use your most recent med list.                   Brand Name Dispense Instructions for use Diagnosis    albuterol 108 (90 BASE) MCG/ACT Inhaler    albuterol    1 Inhaler    INHALE 1 TO 2 PUFFS EVERY 4 TO 6 HOURS AS NEEDED    Chronic obstructive pulmonary disease with acute exacerbation (H)       aspirin  MG EC tablet     30 tablet    Take 1 tablet by mouth daily.        atorvastatin 40 MG tablet    LIPITOR    90 tablet    TAKE 1 TABLET(40 MG) BY MOUTH DAILY    Hyperlipidemia with target LDL less than 70       blood glucose monitoring test strip    no brand specified    200 each    1 strip by In Vitro route 2 times daily Strips per patient's preference        EPINEPHrine 0.3 MG/0.3ML injection 2-pack     EPIPEN/ADRENACLICK/or ANY BX GENERIC EQUIV    0.6 mL    Inject 0.3 mLs (0.3 mg) into the muscle once as needed for anaphylaxis    Allergic reaction, subsequent encounter       escitalopram 20 MG tablet    LEXAPRO    90 tablet    Take 1 tablet (20 mg) by mouth every morning    Major depressive disorder, single episode, in partial remission (H)       febuxostat 40 MG Tabs tablet    ULORIC    90 tablet    Take 1 tablet (40 mg) by mouth every evening    Hyperuricemia       folic acid 800 MCG Tabs      Take 800 mcg by mouth daily.        furosemide 40 MG tablet    LASIX    120 tablet    Take 80 mg by mouth 2 times daily    Chronic diastolic heart failure (H)       glimepiride 4 MG tablet    AMARYL    180 tablet    TAKE 1 TABLET BY MOUTH TWICE DAILY(WITH MEALS)    Type 2 diabetes mellitus with chronic kidney disease, without long-term current use of insulin, unspecified CKD stage (H)       GUAIFENESIN  MG Tbcr     30    Take 600 mg by mouth twice daily        levothyroxine 175 MCG tablet    SYNTHROID/LEVOTHROID    90 tablet    TAKE 1 TABLET(175 MCG) BY MOUTH DAILY. REPEAT LABS IN 4-6 WEEKS    Hypothyroidism, unspecified type       InvaluableCAN FINEPOINT LANCETS Misc     100 each    Use to test blood sugars 2 times daily or as directed.    Type 2 diabetes, HbA1C goal < 8% (H)       metFORMIN 500 MG 24 hr tablet    GLUCOPHAGE-XR     Take 500 mg by mouth 2 times daily (with meals) Reported on 5/23/2017        Miconazole Nitrate 2 % Powd     100 g    Apply to rash on chest three times daily.  Continue for one week after the rash has resolved.    Intertrigo       niacin 500 MG CR tablet    NIASPAN    180 tablet    TAKE 1 TABLET(500 MG) BY MOUTH TWICE DAILY    Hyperlipidemia with target LDL less than 70       nitroGLYcerin 0.4 MG sublingual tablet    NITROSTAT    25 tablet    Place 1 tablet (0.4 mg) under the tongue every 5 minutes as needed for chest pain As needed for chest pain.    Coronary atherosclerosis of unspecified  type of vessel, native or graft       ONE TOUCH ULTRA (DEVICES) Misc     1    test blood sugar BID    Type II or unspecified type diabetes mellitus without mention of complication, not stated as uncontrolled       POTASSIUM CHLORIDE PO      Take 10 mEq by mouth 2 times daily        pregabalin 100 MG capsule    LYRICA    180 capsule    Take 1 capsule (100 mg) by mouth 2 times daily    Myalgia       senna-docusate 8.6-50 MG per tablet    SENOKOT-S;PERICOLACE    60 tablet    Take 1-2 tablets by mouth 2 times daily as needed for constipation    Constipation       sitagliptin 50 MG tablet    JANUVIA    90 tablet    Take 1 tablet (50 mg) by mouth daily    Type 2 diabetes mellitus with chronic kidney disease, without long-term current use of insulin, unspecified CKD stage (H)       TIZANIDINE HCL PO      Take 4 mg by mouth At Bedtime        traZODone 50 MG tablet    DESYREL    90 tablet    Take 3 tablets (150 mg) by mouth At Bedtime    Insomnia, unspecified       vitamin D3 5000 UNIT/ML Liqd      Take 10,000 Units by mouth daily

## 2017-08-23 ENCOUNTER — TELEPHONE (OUTPATIENT)
Dept: FAMILY MEDICINE | Facility: CLINIC | Age: 72
End: 2017-08-23

## 2017-08-23 ENCOUNTER — OFFICE VISIT (OUTPATIENT)
Dept: FAMILY MEDICINE | Facility: CLINIC | Age: 72
End: 2017-08-23
Payer: MEDICARE

## 2017-08-23 VITALS
SYSTOLIC BLOOD PRESSURE: 142 MMHG | HEART RATE: 84 BPM | DIASTOLIC BLOOD PRESSURE: 81 MMHG | OXYGEN SATURATION: 95 % | RESPIRATION RATE: 18 BRPM | TEMPERATURE: 97 F

## 2017-08-23 DIAGNOSIS — R82.90 NONSPECIFIC FINDING ON EXAMINATION OF URINE: ICD-10-CM

## 2017-08-23 DIAGNOSIS — R30.0 DYSURIA: Primary | ICD-10-CM

## 2017-08-23 DIAGNOSIS — Z98.890 POSTOPERATIVE STATE: ICD-10-CM

## 2017-08-23 DIAGNOSIS — E11.22 TYPE 2 DIABETES MELLITUS WITH CHRONIC KIDNEY DISEASE, WITHOUT LONG-TERM CURRENT USE OF INSULIN, UNSPECIFIED CKD STAGE (H): ICD-10-CM

## 2017-08-23 DIAGNOSIS — N89.8 PRURITUS OF VAGINA: ICD-10-CM

## 2017-08-23 DIAGNOSIS — D69.6 THROMBOCYTOPENIA, UNSPECIFIED (H): ICD-10-CM

## 2017-08-23 DIAGNOSIS — E03.9 HYPOTHYROIDISM, UNSPECIFIED TYPE: ICD-10-CM

## 2017-08-23 DIAGNOSIS — E87.6 HYPOKALEMIA: ICD-10-CM

## 2017-08-23 DIAGNOSIS — M54.6 ACUTE RIGHT-SIDED THORACIC BACK PAIN: ICD-10-CM

## 2017-08-23 LAB
ALBUMIN UR-MCNC: NEGATIVE MG/DL
APPEARANCE UR: CLEAR
BACTERIA #/AREA URNS HPF: ABNORMAL /HPF
BILIRUB UR QL STRIP: NEGATIVE
COLOR UR AUTO: YELLOW
ERYTHROCYTE [DISTWIDTH] IN BLOOD BY AUTOMATED COUNT: 13.6 % (ref 10–15)
GLUCOSE UR STRIP-MCNC: NEGATIVE MG/DL
HCT VFR BLD AUTO: 40.8 % (ref 35–47)
HGB BLD-MCNC: 13.2 G/DL (ref 11.7–15.7)
HGB UR QL STRIP: NEGATIVE
KETONES UR STRIP-MCNC: NEGATIVE MG/DL
LEUKOCYTE ESTERASE UR QL STRIP: ABNORMAL
MCH RBC QN AUTO: 27.4 PG (ref 26.5–33)
MCHC RBC AUTO-ENTMCNC: 32.4 G/DL (ref 31.5–36.5)
MCV RBC AUTO: 85 FL (ref 78–100)
NITRATE UR QL: NEGATIVE
NON-SQ EPI CELLS #/AREA URNS LPF: ABNORMAL /LPF
PH UR STRIP: 5.5 PH (ref 5–7)
PLATELET # BLD AUTO: 153 10E9/L (ref 150–450)
RBC # BLD AUTO: 4.81 10E12/L (ref 3.8–5.2)
RBC #/AREA URNS AUTO: ABNORMAL /HPF
SOURCE: ABNORMAL
SP GR UR STRIP: 1.01 (ref 1–1.03)
SPECIMEN SOURCE: NORMAL
T4 FREE SERPL-MCNC: 1.75 NG/DL (ref 0.76–1.46)
TSH SERPL DL<=0.005 MIU/L-ACNC: 0.04 MU/L (ref 0.4–4)
UROBILINOGEN UR STRIP-ACNC: 0.2 EU/DL (ref 0.2–1)
WBC # BLD AUTO: 7.5 10E9/L (ref 4–11)
WBC #/AREA URNS AUTO: ABNORMAL /HPF
WET PREP SPEC: NORMAL

## 2017-08-23 PROCEDURE — 84443 ASSAY THYROID STIM HORMONE: CPT | Performed by: FAMILY MEDICINE

## 2017-08-23 PROCEDURE — 87086 URINE CULTURE/COLONY COUNT: CPT | Performed by: FAMILY MEDICINE

## 2017-08-23 PROCEDURE — 99495 TRANSJ CARE MGMT MOD F2F 14D: CPT | Performed by: FAMILY MEDICINE

## 2017-08-23 PROCEDURE — 84439 ASSAY OF FREE THYROXINE: CPT | Performed by: FAMILY MEDICINE

## 2017-08-23 PROCEDURE — 36415 COLL VENOUS BLD VENIPUNCTURE: CPT | Performed by: FAMILY MEDICINE

## 2017-08-23 PROCEDURE — 81001 URINALYSIS AUTO W/SCOPE: CPT | Performed by: FAMILY MEDICINE

## 2017-08-23 PROCEDURE — 85027 COMPLETE CBC AUTOMATED: CPT | Performed by: FAMILY MEDICINE

## 2017-08-23 PROCEDURE — 87210 SMEAR WET MOUNT SALINE/INK: CPT | Performed by: FAMILY MEDICINE

## 2017-08-23 RX ORDER — LEVOTHYROXINE SODIUM 150 UG/1
150 TABLET ORAL DAILY
Qty: 90 TABLET | Refills: 0 | Status: SHIPPED | OUTPATIENT
Start: 2017-08-23 | End: 2017-11-16

## 2017-08-23 RX ORDER — POTASSIUM CHLORIDE 750 MG/1
20 TABLET, EXTENDED RELEASE ORAL 2 TIMES DAILY
Qty: 120 TABLET | Refills: 3 | Status: SHIPPED | OUTPATIENT
Start: 2017-08-23 | End: 2018-01-23

## 2017-08-23 RX ORDER — NITROFURANTOIN 25; 75 MG/1; MG/1
100 CAPSULE ORAL 2 TIMES DAILY
Qty: 14 CAPSULE | Refills: 0 | Status: SHIPPED | OUTPATIENT
Start: 2017-08-23 | End: 2017-11-15

## 2017-08-23 RX ORDER — CYCLOBENZAPRINE HCL 10 MG
10 TABLET ORAL 3 TIMES DAILY PRN
Qty: 90 TABLET | Refills: 1 | Status: SHIPPED | OUTPATIENT
Start: 2017-08-23 | End: 2017-12-08

## 2017-08-23 NOTE — NURSING NOTE
"Chief Complaint   Patient presents with     Hospital F/U       Initial /81 (BP Location: Left arm, Patient Position: Sitting, Cuff Size: Adult Regular)  Pulse 84  Temp 97  F (36.1  C) (Tympanic)  Resp 18  SpO2 95% Estimated body mass index is 46.79 kg/(m^2) as calculated from the following:    Height as of 6/1/17: 5' 6.5\" (1.689 m).    Weight as of 8/10/17: 294 lb 4.8 oz (133.5 kg).  Medication Reconciliation: unable or not appropriate to perform         Brionna Hernandez MA      "

## 2017-08-23 NOTE — MR AVS SNAPSHOT
After Visit Summary   8/23/2017    Mariel Ribeiro    MRN: [id number]           Patient Information     Date Of Birth          1945        Visit Information        Provider Department      8/23/2017 10:40 AM Rafaela William MD [facility]        Today's Diagnoses     Hypothyroidism, unspecified type    -  1    Type 2 diabetes mellitus with chronic kidney disease, without long-term current use of insulin, unspecified CKD stage (H)        Postoperative state        Thrombocytopenia, unspecified (H)        Dysuria        Pruritus of vagina        Nonspecific finding on examination of urine        Acute right-sided thoracic back pain          Care Instructions    -Macrobid (antibiotic) for a likely bladder infection.  I will call you if we need to alter the medication.   -watch for increased redness/drainage from the belly button  -tizanidine is a muscle relaxant for your back.  You may take 1/2 tab up to 2 full tabs up to three times daily if you need it.            Follow-ups after your visit        Your next 10 appointments already scheduled     Sep 14, 2017  2:00 PM CDT   (Arrive by 1:45 PM)   Post-Op with Jeffrey Roberson MD   [facility] General Surgery ([facility])    [address] [place] [id number] [phone]            Nov 15, 2017 11:30 AM CST   SHORT with Karen Hilton RPH   [facility] ([facility])    [address] [place] [id number] [phone]              Who to contact     If you have questions or need follow up information about today's clinic visit or your schedule please contact [facility] directly at [phone].  Normal or non-critical lab and imaging results will be communicated to you by MyChart, letter or phone within 4 business days after the clinic has received the results. If you do not hear from us within 7  days, please contact the clinic through [employer] or phone. If you have a critical or abnormal lab result, we will notify you by phone as soon as possible.  Submit refill requests through [employer] or call your pharmacy and they will forward the refill request to us. Please allow 3 business days for your refill to be completed.          Additional Information About Your Visit        [employer] Information     [employer] gives you secure access to your electronic health record. If you see a primary care provider, you can also send messages to your care team and make appointments. If you have questions, please call your primary care clinic.  If you do not have a primary care provider, please call [phone] and they will assist you.        Care EveryWhere ID     This is your Care EveryWhere ID. This could be used by other organizations to access your [facility] medical records  [id number]        Your Vitals Were     Pulse Temperature Respirations Pulse Oximetry          84 97  F (36.1  C) (Tympanic) 18 95%         Blood Pressure from Last 3 Encounters:   08/23/17 142/81   08/12/17 172/52   06/01/17 144/71    Weight from Last 3 Encounters:   08/10/17 294 lb 4.8 oz (133.5 kg)   06/01/17 (!) 305 lb (138.3 kg)   05/25/17 (!) 304 lb (137.9 kg)              We Performed the Following     *UA reflex to Microscopic and Culture ([place] [facility] (except [place] and [place])     CBC with platelets     TSH with free T4 reflex     Urine Culture Aerobic Bacterial     Urine Microscopic     Wet prep          Today's Medication Changes          These changes are accurate as of: 8/23/17 12:14 PM.  If you have any questions, ask your nurse or doctor.               Start taking these medicines.        Dose/Directions    nitroFURantoin (macrocrystal-monohydrate) 100 MG capsule   Commonly known as:  MACROBID   Used for:  Dysuria   Started by:  Rafaela William MD        Dose:  100 mg   Take 1 capsule (100 mg) by mouth 2 times  daily   Quantity:  14 capsule   Refills:  0         These medicines have changed or have updated prescriptions.        Dose/Directions    * TIZANIDINE HCL PO   This may have changed:  Another medication with the same name was added. Make sure you understand how and when to take each.        Dose:  4 mg   Take 4 mg by mouth At Bedtime   Refills:  0       * tiZANidine 4 MG tablet   Commonly known as:  ZANAFLEX   This may have changed:  You were already taking a medication with the same name, and this prescription was added. Make sure you understand how and when to take each.   Used for:  Acute right-sided thoracic back pain   Changed by:  Rafaela William MD        Dose:  4-8 mg   Take 1-2 tablets (4-8 mg) by mouth 3 times daily as needed for muscle spasms   Quantity:  30 tablet   Refills:  1       * Notice:  This list has 2 medication(s) that are the same as other medications prescribed for you. Read the directions carefully, and ask your doctor or other care provider to review them with you.         Where to get your medicines      These medications were sent to [facility] [id number] - [place], [place] [facility] [address] [facility] [facility] [address], [place] [address]    Hours:  24-hours Phone:  [phone]     nitroFURantoin (macrocrystal-monohydrate) 100 MG capsule    sitagliptin 50 MG tablet    tiZANidine 4 MG tablet                Primary Care Provider Office Phone # Fax #    Rafaela William [phone] [address] [place] [address]        Equal Access to Services     BRANDON SALVADOR AH: Hadii von ku hadasho Soomaali, waaxda luqadaha, qaybta kaalmada adeegyada, gopal bird. So [facility] [phone].    ATENCIÓN: Si habla español, tiene a gillespie disposición servicios gratuitos de asistencia lingüística. Llame al [phone].    We comply with applicable federal civil rights laws and [place] laws. We do not discriminate on  the basis of race, color, national origin, age, disability sex, sexual orientation or gender identity.            Thank you!     Thank you for choosing [facility]  for your care. Our goal is always to provide you with excellent care. Hearing back from our patients is one way we can continue to improve our services. Please take a few minutes to complete the written survey that you may receive in the mail after your visit with us. Thank you!             Your Updated Medication List - Protect others around you: Learn how to safely use, store and throw away your medicines at www.disposemymeds.org.          This list is accurate as of: 8/23/17 12:14 PM.  Always use your most recent med list.                   Brand Name Dispense Instructions for use Diagnosis    albuterol 108 (90 BASE) MCG/ACT Inhaler    albuterol    1 Inhaler    INHALE 1 TO 2 PUFFS EVERY 4 TO 6 HOURS AS NEEDED    Chronic obstructive pulmonary disease with acute exacerbation (H)       aspirin  MG EC tablet     30 tablet    Take 1 tablet by mouth daily.        atorvastatin 40 MG tablet    LIPITOR    90 tablet    TAKE 1 TABLET(40 MG) BY MOUTH DAILY    Hyperlipidemia with target LDL less than 70       blood glucose monitoring test strip    no brand specified    200 each    1 strip by In Vitro route 2 times daily Strips per patient's preference        EPINEPHrine 0.3 MG/0.3ML injection 2-pack    EPIPEN/ADRENACLICK/or ANY BX GENERIC EQUIV    0.6 mL    Inject 0.3 mLs (0.3 mg) into the muscle once as needed for anaphylaxis    Allergic reaction, subsequent encounter       escitalopram 20 MG tablet    LEXAPRO    90 tablet    Take 1 tablet (20 mg) by mouth every morning    Major depressive disorder, single episode, in partial remission (H)       febuxostat 40 MG Tabs tablet    ULORIC    90 tablet    Take 1 tablet (40 mg) by mouth every evening    Hyperuricemia       folic acid 800 MCG Tabs      Take 800 mcg by mouth daily.         furosemide 40 MG tablet    LASIX    120 tablet    Take 80 mg by mouth 2 times daily    Chronic diastolic heart failure (H)       glimepiride 4 MG tablet    AMARYL    180 tablet    TAKE 1 TABLET BY MOUTH TWICE DAILY(WITH MEALS)    Type 2 diabetes mellitus with chronic kidney disease, without long-term current use of insulin, unspecified CKD stage (H)       GUAIFENESIN  MG Tbcr     30    Take 600 mg by mouth twice daily        levothyroxine 175 MCG tablet    SYNTHROID/LEVOTHROID    90 tablet    TAKE 1 TABLET(175 MCG) BY MOUTH DAILY. REPEAT LABS IN 4-6 WEEKS    Hypothyroidism, unspecified type       JoKnoCAN FINEPOINT LANCETS Misc     100 each    Use to test blood sugars 2 times daily or as directed.    Type 2 diabetes, HbA1C goal < 8% (H)       metFORMIN 500 MG 24 hr tablet    GLUCOPHAGE-XR     Take 500 mg by mouth 2 times daily (with meals) Reported on 5/23/2017        Miconazole Nitrate 2 % Powd     100 g    Apply to rash on chest three times daily.  Continue for one week after the rash has resolved.    Intertrigo       niacin 500 MG CR tablet    NIASPAN    180 tablet    TAKE 1 TABLET(500 MG) BY MOUTH TWICE DAILY    Hyperlipidemia with target LDL less than 70       nitroFURantoin (macrocrystal-monohydrate) 100 MG capsule    MACROBID    14 capsule    Take 1 capsule (100 mg) by mouth 2 times daily    Dysuria       nitroGLYcerin 0.4 MG sublingual tablet    NITROSTAT    25 tablet    Place 1 tablet (0.4 mg) under the tongue every 5 minutes as needed for chest pain As needed for chest pain.    Coronary atherosclerosis of unspecified type of vessel, native or graft       ONE TOUCH ULTRA (DEVICES) Misc     1    test blood sugar BID    Type II or unspecified type diabetes mellitus without mention of complication, not stated as uncontrolled       POTASSIUM CHLORIDE PO      Take 10 mEq by mouth 2 times daily        pregabalin 100 MG capsule    LYRICA    180 capsule    Take 1 capsule (100 mg) by mouth 2 times daily     Myalgia       senna-docusate 8.6-50 MG per tablet    SENOKOT-S;PERICOLACE    60 tablet    Take 1-2 tablets by mouth 2 times daily as needed for constipation    Constipation       sitagliptin 50 MG tablet    JANUVIA    90 tablet    Take 1 tablet (50 mg) by mouth daily    Type 2 diabetes mellitus with chronic kidney disease, without long-term current use of insulin, unspecified CKD stage (H)       * TIZANIDINE HCL PO      Take 4 mg by mouth At Bedtime        * tiZANidine 4 MG tablet    ZANAFLEX    30 tablet    Take 1-2 tablets (4-8 mg) by mouth 3 times daily as needed for muscle spasms    Acute right-sided thoracic back pain       traZODone 50 MG tablet    DESYREL    90 tablet    Take 3 tablets (150 mg) by mouth At Bedtime    Insomnia, unspecified       vitamin D3 5000 UNIT/ML Liqd      Take 10,000 Units by mouth daily        * Notice:  This list has 2 medication(s) that are the same as other medications prescribed for you. Read the directions carefully, and ask your doctor or other care provider to review them with you.

## 2017-08-23 NOTE — LETTER
August 29, 2017      Mariel MONTERROSO Ribeiro  [address] [place] [address]        Dear Mariel,    Your blood counts, including your platelet count (which is usually low), are completely normal.    Your thyroid was off, and I believe you talked with the triage nurses about that.      Let's recheck your thyroid in 4-6 weeks after you've started the lower dose.      Results for orders placed or performed in visit on 08/23/17   CBC with platelets   Result Value Ref Range    WBC 7.5 4.0 - 11.0 10e9/L    RBC Count 4.81 3.8 - 5.2 10e12/L    Hemoglobin 13.2 11.7 - 15.7 g/dL    Hematocrit 40.8 35.0 - 47.0 %    MCV 85 78 - 100 fl    MCH 27.4 26.5 - 33.0 pg    MCHC 32.4 31.5 - 36.5 g/dL    RDW 13.6 10.0 - 15.0 %    Platelet Count 153 150 - 450 10e9/L   *UA reflex to Microscopic and Culture ([place] and [facility] (except [place] and [place])   Result Value Ref Range    Color Urine Yellow     Appearance Urine Clear     Glucose Urine Negative NEG^Negative mg/dL    Bilirubin Urine Negative NEG^Negative    Ketones Urine Negative NEG^Negative mg/dL    Specific Gravity Urine 1.010 1.003 - 1.035    Blood Urine Negative NEG^Negative    pH Urine 5.5 5.0 - 7.0 pH    Protein Albumin Urine Negative NEG^Negative mg/dL    Urobilinogen Urine 0.2 0.2 - 1.0 EU/dL    Nitrite Urine Negative NEG^Negative    Leukocyte Esterase Urine Moderate (A) NEG^Negative    Source Midstream Urine    TSH with free T4 reflex   Result Value Ref Range    TSH 0.04 (L) 0.40 - 4.00 mU/L   Urine Microscopic   Result Value Ref Range    WBC Urine 5-10 (A) OTO2^O - 2 /HPF    RBC Urine O - 2 OTO2^O - 2 /HPF    Squamous Epithelial /LPF Urine Moderate (A) FEW^Few /LPF    Bacteria Urine Moderate (A) NEG^Negative /HPF   T4 free   Result Value Ref Range    T4 Free 1.75 (H) 0.76 - 1.46 ng/dL   Wet prep   Result Value Ref Range    Specimen Description Vagina     Wet Prep No Trichomonas seen     Wet Prep No clue cells seen     Wet Prep No yeast seen    Urine Culture  Aerobic Bacterial   Result Value Ref Range    Specimen Description Midstream Urine     Culture Micro       >100,000 colonies/mL  mixed urogenital luis enrique  Susceptibility testing not routinely done       *Note: Due to a large number of results and/or encounters for the requested time period, some results have not been displayed. A complete set of results can be found in Results Review.       Sincerely,        Rafaela William MD/alison

## 2017-08-23 NOTE — PROGRESS NOTES
HPI      SUBJECTIVE:   Mariel Ribeiro is a 72 year old female who presents to clinic today for the following health issues:        Hospital Follow-up Visit:    Hospital/Nursing Home/IP Rehab Facility: [facility]  Date of Admission: 8/7/2017  Date of Discharge: 8/12/2017  Reason(s) for Admission: Acute pancreatitis             Problems taking medications regularly:  None       Medication changes since discharge: None       Problems adhering to non-medication therapy:  None    Summary of hospitalization:  [facility] discharge summary reviewed  Diagnostic Tests/Treatments reviewed.  Follow up needed: none  Other Healthcare Providers Involved in Patient s Care:         Surgical follow-up appointment - as planned  Update since discharge: improved. See below for some urinary/vaginal symptoms.    Post Discharge Medication Reconciliation: discharge medications reconciled, continue medications without change.  Plan of care communicated with patient     Coding guidelines for this visit:  Type of Medical   Decision Making Face-to-Face Visit       within 7 Days of discharge Face-to-Face Visit        within 14 days of discharge   Moderate Complexity [id number]   High Complexity [id number]          Patient was hospitalized with gallstone pancreatitis, now s/p cholecystectomy.    Has had some mild dysuria, itching and soreness, wonders if she has UTI or yeast?  Notes that it was painful to remove the catheter when she was hospitalized, wonders if she had some trauma with that.  No fevers or chills.  Reports she tapered herself off morphine as promised by end of summer and she feels great off of it.  States she did not fill the dilaudid prescription given to her upon discharge.  States she is doing well on weight loss and is now going to start implementing some dietary changes to keep going with her efforts.  Denies abdominal pain, nausea or vomiting.      Hypothyroidism: with weight loss and  abnormal TSH last time, needs recheck of labs.      Right thoracic back pain; still going on, wants to have a muscle relaxant to try for this.  Has a new mattress.  Thought maybe this was related to her gallbladder, but it still bothering her.      Problem list and histories reviewed & adjusted, as indicated.  Additional history: as documented    Patient Active Problem List   Diagnosis     Hypothyroidism      HX PHYSICAL ABUSE     Coronary atherosclerosis     Myalgia and myositis     Insomnia     Migraine     Chronic airway obstruction/ COPD     Mononeuritis     FAMILY HX-THROMBOSIS     Obstructive sleep apnea     Vitamin D deficiency     Hyperuricemia     Type 2 diabetes mellitus with diabetic chronic kidney disease (H)     H/O chronic kidney disease     Hypertension goal BP (blood pressure) < 130/80     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)     Hyperlipidemia with target LDL less than 70     Chronic diastolic heart failure (H)     Intermediate coronary syndrome (H)     Osteoarthritis     Morbid obesity (H)     Chest pain     Advanced directives, counseling/discussion     Arthritis of knee     Total knee replacement status     Constipation     Hypokalemia     Stroke (H)     Transient cerebral ischemia     Spells     Pain in joint of left shoulder     COPD (chronic obstructive pulmonary disease) (H)     History of thyroid cancer     Status post coronary angiogram     Cervicalgia     Cholelithiasis     Pancreatitis, acute     Past Surgical History:   Procedure Laterality Date     ANGIOGRAPHY  8/11    with stent placement X2 mid- and proximal LAD     ARTHROPLASTY KNEE  1/28/2014    Procedure: ARTHROPLASTY KNEE;  ARTHROPLASTY KNEE -RIGHT TOTAL  ;  Surgeon: Victor Manuel Wolff MD;  Location: [facility]     C TOTAL KNEE ARTHROPLASTY  7/30/04    Knee Replacement, Total right and left     COLONOSCOPY       DILATION AND CURETTAGE, OPERATIVE HYSTEROSCOPY WITH MORCELLATOR, COMBINED N/A 11/1/2016    Procedure: COMBINED DILATION  AND CURETTAGE, OPERATIVE HYSTEROSCOPY WITH MORCELLATOR;  Surgeon: Stacey Arnold DO;  Location: [facility]     HC INTRODUCE NEEDLE/CATH, EXTREMITY ARTERY  1999,2002,2004    Angiocardiogram     HC KNEE SCOPE, DIAGNOSTIC  1990's    Arthroscopy, Knee, bilateral     LAPAROSCOPIC CHOLECYSTECTOMY N/A 8/11/2017    Procedure: LAPAROSCOPIC CHOLECYSTECTOMY;  Laparoscopic Cholecystectomy   *Latex Allergy*, Anesthesia Block;  Surgeon: Jeffrey Roberson MD;  Location: [facility] OR     PHACOEMULSIFICATION CLEAR CORNEA WITH STANDARD INTRAOCULAR LENS IMPLANT Right 12/29/2014    Procedure: PHACOEMULSIFICATION CLEAR CORNEA WITH STANDARD INTRAOCULAR LENS IMPLANT;  Surgeon: Smith Quintana MD;  Location:  [facility]     PHACOEMULSIFICATION CLEAR CORNEA WITH TORIC INTRAOCULAR LENS IMPLANT Left 1/12/2015    Procedure: PHACOEMULSIFICATION CLEAR CORNEA WITH TORIC INTRAOCULAR LENS IMPLANT;  Surgeon: Smith Quintana MD;  Location: [facility]     SURGICAL HISTORY OF -   1963    dentures     SURGICAL HISTORY OF -   1985    thyroidectomy     SURGICAL HISTORY OF -   1998    right thumb surgery     SURGICAL HISTORY OF -   2001    right breast biopsy (benign)     SURGICAL HISTORY OF -   04/2004    left shoulder surgery - rotator cuff     SURGICAL HISTORY OF -   4/09    left thumb surgery     THYROIDECTOMY         Social History   Substance Use Topics     Smoking status: Former Smoker     Packs/day: 0.00     Years: 27.00     Types: Cigarettes     Quit date: 7/1/1989     Smokeless tobacco: Never Used     Alcohol use No     Family History   Problem Relation Age of Onset     C.A.D. Mother      DIABETES Mother      Hypertension Mother      Blood Disease Mother      multiple episodes of thrombosis     Circulatory Mother      DVT X 2; ocular clot; cerebral; carotid artery stenosis     C.A.D. Father      Hypertension Father      CEREBROVASCULAR DISEASE Father      Alcohol/Drug Father      etoh     CANCER Brother      liver,pancreas, brain      Cardiovascular Sister      Hypertension Sister      Hypertension Brother      Alcohol/Drug Sister      etoh     Alcohol/Drug Brother      drug     DIABETES Sister      younger     C.A.D. Sister      CABG age 65     C.A.D. Brother      CABG age 42     C.A.D. Sister      stents age 58     C.A.D. Brother      Genitourinary Problems Sister      kidney disease         Current Outpatient Prescriptions   Medication Sig Dispense Refill     sitagliptin (JANUVIA) 50 MG tablet Take 1 tablet (50 mg) by mouth daily 90 tablet 3     nitroFURantoin, macrocrystal-monohydrate, (MACROBID) 100 MG capsule Take 1 capsule (100 mg) by mouth 2 times daily 14 capsule 0     cyclobenzaprine (FLEXERIL) 10 MG tablet Take 1 tablet (10 mg) by mouth 3 times daily as needed for muscle spasms 90 tablet 1     potassium chloride SA (K-DUR/KLOR-CON M) 10 MEQ CR tablet Take 2 tablets (20 mEq) by mouth 2 times daily 120 tablet 3     levothyroxine (SYNTHROID/LEVOTHROID) 150 MCG tablet Take 1 tablet (150 mcg) by mouth daily 90 tablet 0     TIZANIDINE HCL PO Take 4 mg by mouth At Bedtime       glimepiride (AMARYL) 4 MG tablet TAKE 1 TABLET BY MOUTH TWICE DAILY(WITH MEALS) 180 tablet 0     Miconazole Nitrate 2 % POWD Apply to rash on chest three times daily.  Continue for one week after the rash has resolved. 100 g 3     blood glucose monitoring (NO BRAND SPECIFIED) test strip 1 strip by In Vitro route 2 times daily Strips per patient's preference 200 each 3     niacin (NIASPAN) 500 MG CR tablet TAKE 1 TABLET(500 MG) BY MOUTH TWICE DAILY 180 tablet 1     furosemide (LASIX) 40 MG tablet Take 80 mg by mouth 2 times daily  120 tablet 6     metFORMIN (GLUCOPHAGE-XR) 500 MG 24 hr tablet Take 500 mg by mouth 2 times daily (with meals) Reported on 5/23/2017       atorvastatin (LIPITOR) 40 MG tablet TAKE 1 TABLET(40 MG) BY MOUTH DAILY 90 tablet 3     pregabalin (LYRICA) 100 MG capsule Take 1 capsule (100 mg) by mouth 2 times daily 180 capsule 2     albuterol  (ALBUTEROL) 108 (90 BASE) MCG/ACT Inhaler INHALE 1 TO 2 PUFFS EVERY 4 TO 6 HOURS AS NEEDED 1 Inhaler PRN     febuxostat (ULORIC) 40 MG TABS tablet Take 1 tablet (40 mg) by mouth every evening 90 tablet 3     traZODone (DESYREL) 50 MG tablet Take 3 tablets (150 mg) by mouth At Bedtime 90 tablet 7     EPINEPHrine 0.3 MG/0.3ML injection Inject 0.3 mLs (0.3 mg) into the muscle once as needed for anaphylaxis 0.6 mL 0     escitalopram (LEXAPRO) 20 MG tablet Take 1 tablet (20 mg) by mouth every morning 90 tablet 3     ZandoCAN FINEPOINT LANCETS MISC Use to test blood sugars 2 times daily or as directed. 100 each prn     nitroglycerin (NITROSTAT) 0.4 MG SL tablet Place 1 tablet (0.4 mg) under the tongue every 5 minutes as needed for chest pain As needed for chest pain. 25 tablet PRN     senna-docusate (SENOKOT-S;PERICOLACE) 8.6-50 MG per tablet Take 1-2 tablets by mouth 2 times daily as needed for constipation 60 tablet 0     aspirin  MG tablet Take 1 tablet by mouth daily. 30 tablet 0     Folic Acid 800 MCG TABS Take 800 mcg by mouth daily.       Cholecalciferol (VITAMIN D3) 5000 UNIT/ML LIQD Take 10,000 Units by mouth daily        ONE TOUCH ULTRA (DEVICES) MISC test blood sugar BID 1 0     GUAIFENESIN  MG OR TBCR Take 600 mg by mouth twice daily 30 0     Allergies   Allergen Reactions     Contrast Dye Anaphylaxis     Fish Allergy Anaphylaxis     Iodine Anaphylaxis     Oxycodone Other (See Comments)     Severe suicidal tendencies on this medication     Tree Nuts [Nuts] Anaphylaxis     Tree nuts only (peanuts ok)     Albuterol Other (See Comments)     Sandrita-oral erythema     Bactrim      Increased uric acid     Betadine [Povidone Iodine] Hives, Swelling and Difficulty breathing     Betadine     Combivent      Rash     Dulaglutide Other (See Comments)     Hx . Of thyroid cancer.     Lisinopril Other (See Comments)     Scr/grf severely reduced.      Penicillins Rash     Allopurinol Rash     Latex Rash     Wool  Fiber Rash         Reviewed and updated as needed this visit by clinical staffTobacco  Allergies  Meds  Med Hx  Surg Hx  Fam Hx  Soc Hx      Reviewed and updated as needed this visit by Provider         ROS:  Constitutional, HEENT, cardiovascular, pulmonary, GI, , musculoskeletal, neuro, skin, endocrine and psych systems are negative, except as otherwise noted.        ROS      Physical Exam   Constitutional: She is well-developed, well-nourished, and in no distress.   HENT:   Nose: Nose normal.   Mouth/Throat: Oropharynx is clear and moist. No oropharyngeal exudate.   Eyes: Conjunctivae are normal. Pupils are equal, round, and reactive to light. Right eye exhibits no discharge. Left eye exhibits no discharge. No scleral icterus.   Neck: Neck supple. No thyromegaly present.   Nodule to right neck, as before (this was a skin cyst on ultrasound)   Cardiovascular: Normal rate, regular rhythm and normal heart sounds.  Exam reveals no gallop and no friction rub.    No murmur heard.  Pulmonary/Chest: Effort normal and breath sounds normal. No respiratory distress. She has no wheezes. She has no rales.   Abdominal: Soft. Bowel sounds are normal. She exhibits no distension. There is no tenderness. There is no rebound and no guarding.   Genitourinary:   Genitourinary Comments: Mild erythema of vulvar area   Lymphadenopathy:     She has no cervical adenopathy.   Neurological: She is alert.   Skin: She is not diaphoretic.   Incisions are well healed, the incision to umbilicus has some faint erythema and clear drainage; she says her dog stepped on this area.  It is not tender.     Vitals reviewed.        Diagnostic Test Results:  Results for orders placed or performed in visit on 08/23/17   CBC with platelets   Result Value Ref Range    WBC 7.5 4.0 - 11.0 10e9/L    RBC Count 4.81 3.8 - 5.2 10e12/L    Hemoglobin 13.2 11.7 - 15.7 g/dL    Hematocrit 40.8 35.0 - 47.0 %    MCV 85 78 - 100 fl    MCH 27.4 26.5 - 33.0 pg    MCHC  32.4 31.5 - 36.5 g/dL    RDW 13.6 10.0 - 15.0 %    Platelet Count 153 150 - 450 10e9/L   *UA reflex to Microscopic and Culture ([place] and [facility] (except [place] and [place])   Result Value Ref Range    Color Urine Yellow     Appearance Urine Clear     Glucose Urine Negative NEG^Negative mg/dL    Bilirubin Urine Negative NEG^Negative    Ketones Urine Negative NEG^Negative mg/dL    Specific Gravity Urine 1.010 1.003 - 1.035    Blood Urine Negative NEG^Negative    pH Urine 5.5 5.0 - 7.0 pH    Protein Albumin Urine Negative NEG^Negative mg/dL    Urobilinogen Urine 0.2 0.2 - 1.0 EU/dL    Nitrite Urine Negative NEG^Negative    Leukocyte Esterase Urine Moderate (A) NEG^Negative    Source Midstream Urine    TSH with free T4 reflex   Result Value Ref Range    TSH 0.04 (L) 0.40 - 4.00 mU/L   Urine Microscopic   Result Value Ref Range    WBC Urine 5-10 (A) OTO2^O - 2 /HPF    RBC Urine O - 2 OTO2^O - 2 /HPF    Squamous Epithelial /LPF Urine Moderate (A) FEW^Few /LPF    Bacteria Urine Moderate (A) NEG^Negative /HPF   T4 free   Result Value Ref Range    T4 Free 1.75 (H) 0.76 - 1.46 ng/dL   Wet prep   Result Value Ref Range    Specimen Description Vagina     Wet Prep No Trichomonas seen     Wet Prep No clue cells seen     Wet Prep No yeast seen    Urine Culture Aerobic Bacterial   Result Value Ref Range    Specimen Description Midstream Urine     Culture Micro       >100,000 colonies/mL  mixed urogenital luis enrique  Susceptibility testing not routinely done       *Note: Due to a large number of results and/or encounters for the requested time period, some results have not been displayed. A complete set of results can be found in Results Review.       ASSESSMENT/PLAN:     Gallstone pancreatitis s/p lap jass, and umbilical hernia repair: doing great. The incision site to umbilicus was mildly erythematous with clear drainage, advised to monitor this and f/u if worsening redness/drainage or any pain.  CBC shows normal  wbc.    Dysuria/itching: wet prep was neg, UA showed some WBC, so I started her on macrobid, claus given recent catheterization.  The culture came back with mixed bacteria; see TE.     Hypothyroidism: TSH came back suppressed, I've sent in lower dose, will recheck lab in 4-6 weeks.      Right thoracic back pain: patient has been using tizanidine for lower extremity cramping at night; this wasn't helping her back pain, so will try flexeril; advised not to use both medications at the same time.      Rafaela William MD  [facility]

## 2017-08-23 NOTE — PATIENT INSTRUCTIONS
-Macrobid (antibiotic) for a likely bladder infection.  I will call you if we need to alter the medication.   -watch for increased redness/drainage from the belly button  -tizanidine is a muscle relaxant for your back.  You may take 1/2 tab up to 2 full tabs up to three times daily if you need it.

## 2017-08-23 NOTE — TELEPHONE ENCOUNTER
Please call the patient with her thyroid results, which show even more suppressed TSH (hyperthyroid, aka too much medication).  Probably, with her weight loss, she doesn't need as high a dose anymore.  I have sent in a lower dose of 150mcg.  We should recheck her labs in 4-6 weeks again to make sure that this dose is appropriate.  Thanks.

## 2017-08-24 LAB
BACTERIA SPEC CULT: NORMAL
SPECIMEN SOURCE: NORMAL

## 2017-08-24 NOTE — TELEPHONE ENCOUNTER
D/w Rafaela William MD  Ok to do urostat  Stay on abx a couple of days  Warm baths or squirt bottle to periarea  Info given to pt  Verbalized her understanding.  Opal To RN

## 2017-08-24 NOTE — TELEPHONE ENCOUNTER
"Pt isn't improving on this abx- but only been on the abx since this am-   Can she stay on the abx since having positive leuk est and wbc's    Pt said she is still having the burning - it feels like she is urinating on an \"open sore\"  Would urostat be reasonable despite her bmp?  Opal To RN      "

## 2017-08-24 NOTE — TELEPHONE ENCOUNTER
Her urine culture has come back; no clear infection was found but it looks like her sample was contaminated with skin cells.  There's no clear indication for taking the antibiotic, so ok to just stop.  If symptoms persist/worsen will need to recheck.

## 2017-08-26 DIAGNOSIS — E11.42 TYPE 2 DIABETES MELLITUS WITH DIABETIC POLYNEUROPATHY, WITHOUT LONG-TERM CURRENT USE OF INSULIN (H): ICD-10-CM

## 2017-08-27 NOTE — TELEPHONE ENCOUNTER
Medication Detail      Disp Refills Start End WINSMOE   metFORMIN (GLUCOPHAGE-XR) 500 MG 24 hr tablet     --   Sig: Take 500 mg by mouth 2 times daily (with meals) Reported on 5/23/2017   Class: Historical                Last Office Visit with [facility], [facility] or [facility] prescribing provider:  8/23/2017   Next 5 appointments (look out 90 days)     Nov 15, 2017 11:30 AM CST   [facility]    [address] [place] [id number] [phone]                   BP Readings from Last 3 Encounters:   08/23/17 142/81   08/12/17 172/52   06/01/17 144/71     Lab Results   Component Value Date    MICROL 20 03/08/2017     Lab Results   Component Value Date    UMALCR 19.33 03/08/2017     Creatinine   Date Value Ref Range Status   08/12/2017 1.21 (H) 0.52 - 1.04 mg/dL Final   ]  GFR Estimate   Date Value Ref Range Status   08/12/2017 44 (L) >60 mL/min/1.7m2 Final     Comment:     Non  GFR Calc   08/11/2017 43 (L) >60 mL/min/1.7m2 Final     Comment:     Non  GFR Calc   08/10/2017 46 (L) >60 mL/min/1.7m2 Final     Comment:     Non  GFR Calc     GFR Estimate If Black   Date Value Ref Range Status   08/12/2017 53 (L) >60 mL/min/1.7m2 Final     Comment:      GFR Calc   08/11/2017 53 (L) >60 mL/min/1.7m2 Final     Comment:      GFR Calc   08/10/2017 55 (L) >60 mL/min/1.7m2 Final     Comment:      GFR Calc     Lab Results   Component Value Date    CHOL 186 03/08/2017     Lab Results   Component Value Date    HDL 50 03/08/2017     Lab Results   Component Value Date    LDL 89 03/08/2017     Lab Results   Component Value Date    TRIG 237 03/08/2017     Lab Results   Component Value Date    CHOLHDLRATIO 3.2 05/05/2015     Lab Results   Component Value Date    AST 36 08/12/2017     Lab Results   Component Value Date    ALT 46 08/12/2017     Lab Results    Component Value Date    A1C 7.3 08/09/2017    A1C 7.2 03/08/2017    A1C 7.3 12/23/2016    A1C 8.8 08/10/2016    A1C 7.9 04/05/2016     Potassium   Date Value Ref Range Status   08/12/2017 3.8 3.4 - 5.3 mmol/L Final

## 2017-08-30 RX ORDER — METFORMIN HCL 500 MG
TABLET, EXTENDED RELEASE 24 HR ORAL
Qty: 360 TABLET | Refills: 0 | Status: SHIPPED | OUTPATIENT
Start: 2017-08-30 | End: 2018-06-29

## 2017-09-04 DIAGNOSIS — E03.9 HYPOTHYROIDISM, UNSPECIFIED TYPE: ICD-10-CM

## 2017-09-05 RX ORDER — LEVOTHYROXINE SODIUM 175 UG/1
TABLET ORAL
Qty: 0.1 TABLET | Refills: 0 | OUTPATIENT
Start: 2017-09-05

## 2017-09-05 NOTE — TELEPHONE ENCOUNTER
Dosage was changed due to lab results see script sent 8/23/2017 for levothyroxine (SYNTHROID/LEVOTHROID) 150 MCG tablet 90 tablets

## 2017-09-12 ENCOUNTER — TELEPHONE (OUTPATIENT)
Dept: FAMILY MEDICINE | Facility: CLINIC | Age: 72
End: 2017-09-12

## 2017-09-12 NOTE — TELEPHONE ENCOUNTER
Mariel will schedule a MA appointment for BP follow up. Let her know that she could go to the pharmacy as well. No charge to her.  Please schedule.    Eve Laurent

## 2017-09-13 ENCOUNTER — CARE COORDINATION (OUTPATIENT)
Dept: SURGERY | Facility: CLINIC | Age: 72
End: 2017-09-13

## 2017-09-13 NOTE — PROGRESS NOTES
Pre Visit Call and Assessment    Date of call:  09/13/2017    Phone numbers:  Home number on file [phone] (home)    Reached patient/confirmed appointment:  No - left message:   on voicemail  Patient care team/Primary provider:  Rafaela William outpatient pharmacy:    [facility] - [place], [place] - [address] AT [address] [place] [address]  Phone: [phone] Fax: [phone]    [facility] - [place], [place] - [address] AT [facility] [address] [place] [address]  Phone: [phone] Fax: [phone]    Referred to:  Dr. Jeffrey Roberson    Reason for visit:  Follow up

## 2017-09-14 ENCOUNTER — OFFICE VISIT (OUTPATIENT)
Dept: SURGERY | Facility: CLINIC | Age: 72
End: 2017-09-14

## 2017-09-14 ENCOUNTER — OFFICE VISIT (OUTPATIENT)
Dept: OPHTHALMOLOGY | Facility: CLINIC | Age: 72
End: 2017-09-14

## 2017-09-14 VITALS
WEIGHT: 293 LBS | SYSTOLIC BLOOD PRESSURE: 133 MMHG | OXYGEN SATURATION: 95 % | DIASTOLIC BLOOD PRESSURE: 59 MMHG | TEMPERATURE: 97.5 F | BODY MASS INDEX: 45.99 KG/M2 | HEIGHT: 67 IN | HEART RATE: 76 BPM

## 2017-09-14 DIAGNOSIS — H16.223 KERATITIS SICCA, BILATERAL: Primary | ICD-10-CM

## 2017-09-14 DIAGNOSIS — H04.129 DRY EYE: ICD-10-CM

## 2017-09-14 DIAGNOSIS — H53.2 MONOCULAR DIPLOPIA OF BOTH EYES: ICD-10-CM

## 2017-09-14 DIAGNOSIS — Z98.890 POST-OPERATIVE STATE: Primary | ICD-10-CM

## 2017-09-14 RX ORDER — CYCLOSPORINE 0.5 MG/ML
1 EMULSION OPHTHALMIC 2 TIMES DAILY
Qty: 180 EACH | Refills: 3 | Status: ON HOLD | OUTPATIENT
Start: 2017-09-14 | End: 2018-07-23

## 2017-09-14 ASSESSMENT — REFRACTION_MANIFEST
OS_SPHERE: -3.00
OD_AXIS: 062
OD_SPHERE: -3.00
OS_ADD: +2.50
OD_ADD: +2.50
OS_AXIS: 131
OS_CYLINDER: +1.25
OD_CYLINDER: +1.75

## 2017-09-14 ASSESSMENT — VISUAL ACUITY
OS_CC+: -2
OS_CC: 20/50
METHOD: SNELLEN - LINEAR
CORRECTION_TYPE: GLASSES
OD_CC: J16
OD_CC: 20/60
OS_CC: J3

## 2017-09-14 ASSESSMENT — REFRACTION_WEARINGRX
OS_SPHERE: -2.50
OS_AXIS: 048
OS_ADD: +2.50
OD_SPHERE: -2.25
OD_AXIS: 015
SPECS_TYPE: PAL
OS_CYLINDER: +0.50
OD_ADD: +2.50
OD_CYLINDER: +0.75

## 2017-09-14 ASSESSMENT — CONF VISUAL FIELD
OD_NORMAL: 1
METHOD: COUNTING FINGERS
OS_NORMAL: 1

## 2017-09-14 ASSESSMENT — SLIT LAMP EXAM - LIDS
COMMENTS: NORMAL
COMMENTS: NORMAL

## 2017-09-14 ASSESSMENT — TONOMETRY
IOP_METHOD: ICARE
OD_IOP_MMHG: 10
OS_IOP_MMHG: 13

## 2017-09-14 ASSESSMENT — EXTERNAL EXAM - LEFT EYE: OS_EXAM: NORMAL

## 2017-09-14 ASSESSMENT — REFRACTION_CURRENTRX
OS_BRAND: ACUVUE OASYS
OS_BASECURVE: 8.4
OS_SPHERE: -0.50
OD_BRAND: ACUVUE OASYS
OD_DIAMETER: 14.0
OS_DIAMETER: 14.0
OD_SPHERE: -0.50
OD_BASECURVE: 8.4

## 2017-09-14 ASSESSMENT — EXTERNAL EXAM - RIGHT EYE: OD_EXAM: NORMAL

## 2017-09-14 ASSESSMENT — PAIN SCALES - GENERAL: PAINLEVEL: SEVERE PAIN (7)

## 2017-09-14 NOTE — NURSING NOTE
Chief Complaints and History of Present Illnesses   Patient presents with     Annual Eye Exam     HPI    Affected eye(s):  Both   Symptoms:     Blurred vision   Double vision      Frequency:  Constant       Do you have eye pain now?:  Yes   Location:  OU   Pain Level:  Mild Pain (3)   Pain Frequency:  Constant   Pain Characteristics:  Stabbing      Comments:  Patient has had double vision since concussion. She also has blurred vision in glasses and constant eye pain. Uses ATs PRN OU.    H/o migraines     Lab Results       Component                Value               Date                       A1C                      7.3                 08/09/2017                 A1C                      7.2                 03/08/2017                 A1C                      7.3                 12/23/2016                 A1C                      8.8                 08/10/2016                 A1C                      7.9                 04/05/2016                Krystyna Kincaid COT 12:02 PM September 14, 2017

## 2017-09-14 NOTE — MR AVS SNAPSHOT
After Visit Summary   9/14/2017    Mariel Ribeiro    MRN: [id number]           Patient Information     Date Of Birth          1945        Visit Information        Provider Department      9/14/2017 12:00 PM Camille Espinal OD M [facility] Ophthalmology        Today's Diagnoses     Keratitis sicca, bilateral    -  1       Follow-ups after your visit        Your next 10 appointments already scheduled     Sep 14, 2017  2:00 PM CDT   (Arrive by 1:45 PM)   Post-Op with Jeffrey Roberson MD   [facility] General Surgery ([facility])    [address] [place] [address] [phone]            Sep 21, 2017  1:20 PM CDT   (Arrive by 1:05 PM)   RETURN GENERAL with REYES Ochoa [facility] Ophthalmology ([facility])    [address] [place] [address] [phone]              Who to contact     Please call your clinic at [phone] to:    Ask questions about your health    Make or cancel appointments    Discuss your medicines    Learn about your test results    Speak to your doctor   If you have compliments or concerns about an experience at your clinic, or if you wish to file a complaint, please contact [facility] Patient Relations at [phone] or email us at [email or web address]         Additional Information About Your Visit        MyChart Information     [employer] gives you secure access to your electronic health record. If you see a primary care provider, you can also send messages to your care team and make appointments. If you have questions, please call your primary care clinic.  If you do not have a primary care provider, please call [phone] and they will assist you.      [employer] is an electronic gateway that provides easy, online access to your medical records. With [employer], you can request a clinic appointment, read your test  results, renew a prescription or communicate with your care team.     To access your existing account, please contact your [facility] or call [phone] for assistance.        Care EveryWhere ID     This is your Care EveryWhere ID. This could be used by other organizations to access your [facility] medical records  [id number]         Blood Pressure from Last 3 Encounters:   08/23/17 142/81   08/12/17 172/52   06/01/17 144/71    Weight from Last 3 Encounters:   08/10/17 133.5 kg (294 lb 4.8 oz)   06/01/17 (!) 138.3 kg (305 lb)   05/25/17 (!) 137.9 kg (304 lb)              Today, you had the following     No orders found for display         Today's Medication Changes          These changes are accurate as of: 9/14/17  1:24 PM.  If you have any questions, ask your nurse or doctor.               Start taking these medicines.        Dose/Directions    cycloSPORINE 0.05 % ophthalmic emulsion   Commonly known as:  RESTASIS   Used for:  Keratitis sicca, bilateral   Started by:  Camille Espinal, REYES        Dose:  1 drop   Place 1 drop into both eyes 2 times daily   Quantity:  180 each   Refills:  3            Where to get your medicines      These medications were sent to [id number] - [place], [place] - [address] AT [facility] [address]  [facility] [address], [place] [address]    Hours:  24-hours Phone:  [phone]     cycloSPORINE 0.05 % ophthalmic emulsion                Primary Care Provider Office Phone # Fax #    Rafaela William [phone] [address] [place] [address]        Equal Access to Services     KJ SALVADOR AH: Hadii aad ku janeth Sopasha, waaxda luqadaha, qaybta kaalmada adeegyada, gopal bird. So [facility] [phone].    ATENCIÓN: Si habla español, tiene a gillespie disposición servicios gratuitos de asistencia lingüística. Llame al [phone].    We comply with applicable  federal civil rights laws and [place] laws. We do not discriminate on the basis of race, color, national origin, age, disability sex, sexual orientation or gender identity.            Thank you!     Thank you for choosing [facility] OPHTHALMOLOG[facility]  for your care. Our goal is always to provide you with excellent care. Hearing back from our patients is one way we can continue to improve our services. Please take a few minutes to complete the written survey that you may receive in the mail after your visit with us. Thank you!             Your Updated Medication List - Protect others around you: Learn how to safely use, store and throw away your medicines at www.disposemymeds.org.          This list is accurate as of: 9/14/17  1:24 PM.  Always use your most recent med list.                   Brand Name Dispense Instructions for use Diagnosis    albuterol 108 (90 BASE) MCG/ACT Inhaler    PROAIR HFA    1 Inhaler    INHALE 1 TO 2 PUFFS EVERY 4 TO 6 HOURS AS NEEDED    Chronic obstructive pulmonary disease with acute exacerbation (H)       aspirin  MG EC tablet     30 tablet    Take 1 tablet by mouth daily.        atorvastatin 40 MG tablet    LIPITOR    90 tablet    TAKE 1 TABLET(40 MG) BY MOUTH DAILY    Hyperlipidemia with target LDL less than 70       blood glucose monitoring test strip    no brand specified    200 each    1 strip by In Vitro route 2 times daily Strips per patient's preference        cyclobenzaprine 10 MG tablet    FLEXERIL    90 tablet    Take 1 tablet (10 mg) by mouth 3 times daily as needed for muscle spasms    Acute right-sided thoracic back pain       cycloSPORINE 0.05 % ophthalmic emulsion    RESTASIS    180 each    Place 1 drop into both eyes 2 times daily    Keratitis sicca, bilateral       EPINEPHrine 0.3 MG/0.3ML injection 2-pack    EPIPEN/ADRENACLICK/or ANY BX GENERIC EQUIV    0.6 mL    Inject 0.3 mLs (0.3 mg) into the muscle once as needed for anaphylaxis    Allergic reaction, subsequent  encounter       escitalopram 20 MG tablet    LEXAPRO    90 tablet    Take 1 tablet (20 mg) by mouth every morning    Major depressive disorder, single episode, in partial remission (H)       febuxostat 40 MG Tabs tablet    ULORIC    90 tablet    Take 1 tablet (40 mg) by mouth every evening    Hyperuricemia       folic acid 800 MCG Tabs      Take 800 mcg by mouth daily.        furosemide 40 MG tablet    LASIX    120 tablet    Take 80 mg by mouth 2 times daily    Chronic diastolic heart failure (H)       glimepiride 4 MG tablet    AMARYL    180 tablet    TAKE 1 TABLET BY MOUTH TWICE DAILY(WITH MEALS)    Type 2 diabetes mellitus with chronic kidney disease, without long-term current use of insulin, unspecified CKD stage (H)       GUAIFENESIN  MG Tbcr     30    Take 600 mg by mouth twice daily        levothyroxine 150 MCG tablet    SYNTHROID/LEVOTHROID    90 tablet    Take 1 tablet (150 mcg) by mouth daily    Hypothyroidism, unspecified type       [employer] | Anywhere LANCETS Misc     100 each    Use to test blood sugars 2 times daily or as directed.    Type 2 diabetes, HbA1C goal < 8% (H)       * metFORMIN 500 MG 24 hr tablet    GLUCOPHAGE-XR     Take 500 mg by mouth 2 times daily (with meals) Reported on 5/23/2017        * metFORMIN 500 MG 24 hr tablet    GLUCOPHAGE-XR    360 tablet    TAKE 2 TABLETS BY MOUTH AFTER BREAKFAST AND SUPPER    Type 2 diabetes mellitus with diabetic polyneuropathy, without long-term current use of insulin (H)       Miconazole Nitrate 2 % Powd     100 g    Apply to rash on chest three times daily.  Continue for one week after the rash has resolved.    Intertrigo       niacin 500 MG CR tablet    NIASPAN    180 tablet    TAKE 1 TABLET(500 MG) BY MOUTH TWICE DAILY    Hyperlipidemia with target LDL less than 70       nitroFURantoin (macrocrystal-monohydrate) 100 MG capsule    MACROBID    14 capsule    Take 1 capsule (100 mg) by mouth 2 times daily    Dysuria       nitroGLYcerin 0.4 MG  sublingual tablet    NITROSTAT    25 tablet    Place 1 tablet (0.4 mg) under the tongue every 5 minutes as needed for chest pain As needed for chest pain.    Coronary atherosclerosis of unspecified type of vessel, native or graft       ONE TOUCH ULTRA (DEVICES) Misc     1    test blood sugar BID    Type II or unspecified type diabetes mellitus without mention of complication, not stated as uncontrolled       potassium chloride SA 10 MEQ CR tablet    K-DUR/KLOR-CON M    120 tablet    Take 2 tablets (20 mEq) by mouth 2 times daily    Hypokalemia       pregabalin 100 MG capsule    LYRICA    180 capsule    Take 1 capsule (100 mg) by mouth 2 times daily    Myalgia       senna-docusate 8.6-50 MG per tablet    SENOKOT-S;PERICOLACE    60 tablet    Take 1-2 tablets by mouth 2 times daily as needed for constipation    Constipation       sitagliptin 50 MG tablet    JANUVIA    90 tablet    Take 1 tablet (50 mg) by mouth daily    Type 2 diabetes mellitus with chronic kidney disease, without long-term current use of insulin, unspecified CKD stage (H)       TIZANIDINE HCL PO      Take 4 mg by mouth At Bedtime        traZODone 50 MG tablet    DESYREL    90 tablet    Take 3 tablets (150 mg) by mouth At Bedtime    Insomnia, unspecified       vitamin D3 5000 UNIT/ML Liqd      Take 10,000 Units by mouth daily        * Notice:  This list has 2 medication(s) that are the same as other medications prescribed for you. Read the directions carefully, and ask your doctor or other care provider to review them with you.

## 2017-09-14 NOTE — NURSING NOTE
"Chief Complaint   Patient presents with     Surgical Followup     post op       Vitals:    09/14/17 1503   BP: 133/59   BP Location: Left arm   Patient Position: Chair   Cuff Size: Adult Regular   Pulse: 76   Temp: 97.5  F (36.4  C)   SpO2: 95%   Weight: 295 lb 4.8 oz   Height: 5' 6.5\"       Body mass index is 46.95 kg/(m^2).    Yessy Dumont MA                          "

## 2017-09-14 NOTE — LETTER
9/14/2017       RE: Mariel Ribeiro  [address] [place] [address]     Dear Colleague,    Thank you for referring your patient, Mariel Ribeiro, to the [facility] GENERAL SURGERY at [facility]. Please see a copy of my visit note below.    HISTORY OF PRESENT ILLNESS:  Mariel Ribeiro, a 72-year-old female well known to the Surgery Service, presents following laparoscopic cholecystectomy.  The patient is doing very well following surgery.  She has no new complaints.  Eating regular diet, having normal bowel movements.  Has no drainage from any of the sites other than the umbilicus where she has scant drainage which is improving slowly.  The patient has no fever, chills or other abnormalities noted.  She has had no specific new problems since surgery.      PHYSICAL EXAMINATION:  Mariel Ribeiro moves easily to the examination table.  Abdomen is soft and nontender.  All the incisions are clean, dry and intact.  The umbilical site has a small eschar.  There is no erythema, fluctuance or other abnormalities and no drainage at the umbilical site today.      IMPRESSION:  Mariel Ribeiro is doing very well following laparoscopic cholecystectomy.  We will follow her if needed for the draining site at the umbilicus.  She will call or return should she have additional new problems.         Again, thank you for allowing me to participate in the care of your patient.      Sincerely,    Jeffrey Roberson MD

## 2017-09-14 NOTE — PROGRESS NOTES
A/P  1.) Dry Eye OU  -Significant staining OD>OS despite topical lubrication  -Start with 1 week BCL trial. If tolerates well and cornea improved will consider long term  -Start Restasis bid OU  -Continue AT prn over CL's  -If refraction stable next exam consider updating    2.) Monocular diplopia OU  -Longstanding since concussion 11/2016  -Evaluated by Dr. Ramos without significant findings besides dry eye  -Monitor    RTC 1 week for dry eye f/u, re-refraction    I have confirmed the patient's CC, HPI and reviewed Past Medical History, Past Surgical History, Social History, Family History, Problem List, Medication List and agree with Tech note.     Camille Espinal, OD FAAO

## 2017-09-18 DIAGNOSIS — F32.4 MAJOR DEPRESSIVE DISORDER, SINGLE EPISODE, IN PARTIAL REMISSION (H): ICD-10-CM

## 2017-09-18 NOTE — TELEPHONE ENCOUNTER
Medication Detail      Disp Refills Start End WINSOME   escitalopram (LEXAPRO) 20 MG tablet 90 tablet 3 8/22/2016  No   Sig: Take 1 tablet (20 mg) by mouth every morning       Last Office Visit with [facility] primary care provider:  8-23-17        Last PHQ-9 score on record=   PHQ-9 SCORE 5/19/2017   Total Score -   Total Score MyChart -   Total Score 11

## 2017-09-18 NOTE — PROGRESS NOTES
HISTORY OF PRESENT ILLNESS:  Mariel Ribeiro, a 72-year-old female well known to the Surgery Service, presents following laparoscopic cholecystectomy.  The patient is doing very well following surgery.  She has no new complaints.  Eating regular diet, having normal bowel movements.  Has no drainage from any of the sites other than the umbilicus where she has scant drainage which is improving slowly.  The patient has no fever, chills or other abnormalities noted.  She has had no specific new problems since surgery.      PHYSICAL EXAMINATION:  Mariel Ribeiro moves easily to the examination table.  Abdomen is soft and nontender.  All the incisions are clean, dry and intact.  The umbilical site has a small eschar.  There is no erythema, fluctuance or other abnormalities and no drainage at the umbilical site today.      IMPRESSION:  Mariel Rbieiro is doing very well following laparoscopic cholecystectomy.  We will follow her if needed for the draining site at the umbilicus.  She will call or return should she have additional new problems.

## 2017-09-19 PROBLEM — Z98.890 POSTOPERATIVE STATE: Status: ACTIVE | Noted: 2017-09-19

## 2017-09-20 NOTE — TELEPHONE ENCOUNTER
It looks like she doesn't read her my chart messages, please call and do a phq-9 please  Thank you

## 2017-09-21 ENCOUNTER — OFFICE VISIT (OUTPATIENT)
Dept: OPHTHALMOLOGY | Facility: CLINIC | Age: 72
End: 2017-09-21

## 2017-09-21 DIAGNOSIS — H04.129 DRY EYE: Primary | ICD-10-CM

## 2017-09-21 DIAGNOSIS — H53.2 MONOCULAR DIPLOPIA OF BOTH EYES: ICD-10-CM

## 2017-09-21 DIAGNOSIS — H16.223 KERATITIS SICCA, BILATERAL: ICD-10-CM

## 2017-09-21 RX ORDER — ESCITALOPRAM OXALATE 20 MG/1
TABLET ORAL
Qty: 90 TABLET | Refills: 1 | Status: SHIPPED | OUTPATIENT
Start: 2017-09-21 | End: 2018-03-10

## 2017-09-21 ASSESSMENT — EXTERNAL EXAM - RIGHT EYE: OD_EXAM: NORMAL

## 2017-09-21 ASSESSMENT — CONF VISUAL FIELD
OS_NORMAL: 1
METHOD: COUNTING FINGERS
OD_NORMAL: 1

## 2017-09-21 ASSESSMENT — TONOMETRY
OS_IOP_MMHG: 14
OD_IOP_MMHG: 11
IOP_METHOD: ICARE

## 2017-09-21 ASSESSMENT — EXTERNAL EXAM - LEFT EYE: OS_EXAM: NORMAL

## 2017-09-21 ASSESSMENT — VISUAL ACUITY
CORRECTION_TYPE: GLASSES
OS_CC: 20/60
OS_CC+: -2
METHOD: SNELLEN - LINEAR
OD_CC: 20/70

## 2017-09-21 ASSESSMENT — SLIT LAMP EXAM - LIDS
COMMENTS: MILD BLEPH
COMMENTS: MILD BLEPH

## 2017-09-21 ASSESSMENT — PATIENT HEALTH QUESTIONNAIRE - PHQ9: SUM OF ALL RESPONSES TO PHQ QUESTIONS 1-9: 3

## 2017-09-21 NOTE — MR AVS SNAPSHOT
After Visit Summary   9/21/2017    Mariel Ribeiro    MRN: [id number]           Patient Information     Date Of Birth          1945        Visit Information        Provider Department      9/21/2017 1:20 PM Camille Espinal OD M [facility] Ophthalmology        Today's Diagnoses     Dry eye    -  1    Keratitis sicca, bilateral           Follow-ups after your visit        Your next 10 appointments already scheduled     Dec 07, 2017  1:40 PM CST   (Arrive by 1:25 PM)   RETURN GENERAL with REYES Ochoa [facility] Ophthalmology ([facility])    [address] [place] [address] [phone]              Who to contact     Please call your clinic at [phone] to:    Ask questions about your health    Make or cancel appointments    Discuss your medicines    Learn about your test results    Speak to your doctor   If you have compliments or concerns about an experience at your clinic, or if you wish to file a complaint, please contact [facility] Patient Relations at [phone] or email us at [email or web address]         Additional Information About Your Visit        MyChart Information     [employer] gives you secure access to your electronic health record. If you see a primary care provider, you can also send messages to your care team and make appointments. If you have questions, please call your primary care clinic.  If you do not have a primary care provider, please call [phone] and they will assist you.      [employer] is an electronic gateway that provides easy, online access to your medical records. With [employer], you can request a clinic appointment, read your test results, renew a prescription or communicate with your care team.     To access your existing account, please contact your [facility] or call [phone] for assistance.        Care EveryWhere ID      This is your Care EveryWhere ID. This could be used by other organizations to access your [facility] medical records  [id number]         Blood Pressure from Last 3 Encounters:   09/14/17 133/59   08/23/17 142/81   08/12/17 172/52    Weight from Last 3 Encounters:   09/14/17 133.9 kg (295 lb 4.8 oz)   08/10/17 133.5 kg (294 lb 4.8 oz)   06/01/17 (!) 138.3 kg (305 lb)              Today, you had the following     No orders found for display         Today's Medication Changes          These changes are accurate as of: 9/21/17  2:12 PM.  If you have any questions, ask your nurse or doctor.               Start taking these medicines.        Dose/Directions    Loteprednol Etabonate 0.5 % Oint opthalmic ointment   Commonly known as:  LOTEMAX   Used for:  Dry eye, Keratitis sicca, bilateral   Started by:  Camille Espinal, OD        Dose:  1 Application   Place 1 Application (4 g) into both eyes At Bedtime   Quantity:  1 Tube   Refills:  1            Where to get your medicines      These medications were sent to [id number] - [place], [place] - [address] AT [facility] [address]  [address], [place] [address]    Hours:  24-hours Phone:  [phone]     Loteprednol Etabonate 0.5 % Oint opthalmic ointment                Primary Care Provider Office Phone # Fax #    Rafaela William [phone] [address] [place] [address]        Equal Access to Services     KJ SALVADOR AH: Hadii von fowler Sopasha, waaxda luqadaha, qaybta kaalmada brandon, gopal bird. So [facility] [place] [phone].    ATENCIÓN: Si habla español, tiene a gillespie disposición servicios gratuitos de asistencia lingüística. Hebert castellanos [phone].    We comply with applicable federal civil rights laws and [place] laws. We do not discriminate on the basis of race, color, national origin, age, disability sex, sexual orientation or gender identity.             Thank you!     Thank you for choosing [facility] OPHTHALMOLOGY  for your care. Our goal is always to provide you with excellent care. Hearing back from our patients is one way we can continue to improve our services. Please take a few minutes to complete the written survey that you may receive in the mail after your visit with us. Thank you!             Your Updated Medication List - Protect others around you: Learn how to safely use, store and throw away your medicines at www.disposemymeds.org.          This list is accurate as of: 9/21/17  2:12 PM.  Always use your most recent med list.                   Brand Name Dispense Instructions for use Diagnosis    albuterol 108 (90 BASE) MCG/ACT Inhaler    PROAIR HFA    1 Inhaler    INHALE 1 TO 2 PUFFS EVERY 4 TO 6 HOURS AS NEEDED    Chronic obstructive pulmonary disease with acute exacerbation (H)       aspirin  MG EC tablet     30 tablet    Take 1 tablet by mouth daily.        atorvastatin 40 MG tablet    LIPITOR    90 tablet    TAKE 1 TABLET(40 MG) BY MOUTH DAILY    Hyperlipidemia with target LDL less than 70       blood glucose monitoring test strip    no brand specified    200 each    1 strip by In Vitro route 2 times daily Strips per patient's preference        cyclobenzaprine 10 MG tablet    FLEXERIL    90 tablet    Take 1 tablet (10 mg) by mouth 3 times daily as needed for muscle spasms    Acute right-sided thoracic back pain       cycloSPORINE 0.05 % ophthalmic emulsion    RESTASIS    180 each    Place 1 drop into both eyes 2 times daily    Keratitis sicca, bilateral       EPINEPHrine 0.3 MG/0.3ML injection 2-pack    EPIPEN/ADRENACLICK/or ANY BX GENERIC EQUIV    0.6 mL    Inject 0.3 mLs (0.3 mg) into the muscle once as needed for anaphylaxis    Allergic reaction, subsequent encounter       escitalopram 20 MG tablet    LEXAPRO    90 tablet    TAKE 1 TABLET BY MOUTH EVERY MORNING    Major depressive disorder, single episode, in partial remission (H)        febuxostat 40 MG Tabs tablet    ULORIC    90 tablet    Take 1 tablet (40 mg) by mouth every evening    Hyperuricemia       folic acid 800 MCG Tabs      Take 800 mcg by mouth daily.        furosemide 40 MG tablet    LASIX    120 tablet    Take 80 mg by mouth 2 times daily    Chronic diastolic heart failure (H)       glimepiride 4 MG tablet    AMARYL    180 tablet    TAKE 1 TABLET BY MOUTH TWICE DAILY(WITH MEALS)    Type 2 diabetes mellitus with chronic kidney disease, without long-term current use of insulin, unspecified CKD stage (H)       GUAIFENESIN  MG Tbcr     30    Take 600 mg by mouth twice daily        levothyroxine 150 MCG tablet    SYNTHROID/LEVOTHROID    90 tablet    Take 1 tablet (150 mcg) by mouth daily    Hypothyroidism, unspecified type       [employer] LANCETS Misc     100 each    Use to test blood sugars 2 times daily or as directed.    Type 2 diabetes, HbA1C goal < 8% (H)       Loteprednol Etabonate 0.5 % Oint opthalmic ointment    LOTEMAX    1 Tube    Place 1 Application (4 g) into both eyes At Bedtime    Dry eye, Keratitis sicca, bilateral       * metFORMIN 500 MG 24 hr tablet    GLUCOPHAGE-XR     Take 500 mg by mouth 2 times daily (with meals) Reported on 5/23/2017        * metFORMIN 500 MG 24 hr tablet    GLUCOPHAGE-XR    360 tablet    TAKE 2 TABLETS BY MOUTH AFTER BREAKFAST AND SUPPER    Type 2 diabetes mellitus with diabetic polyneuropathy, without long-term current use of insulin (H)       Miconazole Nitrate 2 % Powd     100 g    Apply to rash on chest three times daily.  Continue for one week after the rash has resolved.    Intertrigo       niacin 500 MG CR tablet    NIASPAN    180 tablet    TAKE 1 TABLET(500 MG) BY MOUTH TWICE DAILY    Hyperlipidemia with target LDL less than 70       nitroFURantoin (macrocrystal-monohydrate) 100 MG capsule    MACROBID    14 capsule    Take 1 capsule (100 mg) by mouth 2 times daily    Dysuria       nitroGLYcerin 0.4 MG sublingual tablet     NITROSTAT    25 tablet    Place 1 tablet (0.4 mg) under the tongue every 5 minutes as needed for chest pain As needed for chest pain.    Coronary atherosclerosis of unspecified type of vessel, native or graft       ONE TOUCH ULTRA (DEVICES) Misc     1    test blood sugar BID    Type II or unspecified type diabetes mellitus without mention of complication, not stated as uncontrolled       potassium chloride SA 10 MEQ CR tablet    K-DUR/KLOR-CON M    120 tablet    Take 2 tablets (20 mEq) by mouth 2 times daily    Hypokalemia       pregabalin 100 MG capsule    LYRICA    180 capsule    Take 1 capsule (100 mg) by mouth 2 times daily    Myalgia       senna-docusate 8.6-50 MG per tablet    SENOKOT-S;PERICOLACE    60 tablet    Take 1-2 tablets by mouth 2 times daily as needed for constipation    Constipation       sitagliptin 50 MG tablet    JANUVIA    90 tablet    Take 1 tablet (50 mg) by mouth daily    Type 2 diabetes mellitus with chronic kidney disease, without long-term current use of insulin, unspecified CKD stage (H)       TIZANIDINE HCL PO      Take 4 mg by mouth At Bedtime        traZODone 50 MG tablet    DESYREL    90 tablet    Take 3 tablets (150 mg) by mouth At Bedtime    Insomnia, unspecified       vitamin D3 5000 UNIT/ML Liqd      Take 10,000 Units by mouth daily        * Notice:  This list has 2 medication(s) that are the same as other medications prescribed for you. Read the directions carefully, and ask your doctor or other care provider to review them with you.

## 2017-09-21 NOTE — NURSING NOTE
Chief Complaints and History of Present Illnesses   Patient presents with     Follow Up For     BCL and MR     HPI    Affected eye(s):  Both   Symptoms:     Blurred vision      Frequency:  Constant       Do you have eye pain now?:  No      Comments:  Left lens fell out one day after we saw her last, right eye lens stayed in till yesterday. She states that she had double vision and blurry vision with CLs in. The eyes felt gritty all the time with CL in. No eye pain today. Above right brow ache today. She has been doing the Restasis but having trouble getting the drops in.    Krystyna Kincaid COT 1:17 PM September 21, 2017

## 2017-09-21 NOTE — TELEPHONE ENCOUNTER
Called pt and LVM for pt to call back.  Please do PHQ9 when pt calls back.      Brionna Hernandez MA

## 2017-09-21 NOTE — PROGRESS NOTES
A/P  1.) Dry Eye OU  -Significant staining OD>OS despite topical lubrication  -Failed BCL, but with improved corneal appearance  -Start Restasis bid OU  -Short term lotemax brtitney pulse x 1 month  -Okay to fill spec Rx at this point    2.) Monocular diplopia OU  -Longstanding since concussion 11/2016  -Evaluated by Dr. Ramos without significant findings besides dry eye  -Monitor    RTC 2 months f/u    I have confirmed the patient's CC, HPI and reviewed Past Medical History, Past Surgical History, Social History, Family History, Problem List, Medication List and agree with Tech note.     Camille Espinal, OD FAAO

## 2017-10-04 ENCOUNTER — APPOINTMENT (OUTPATIENT)
Dept: GENERAL RADIOLOGY | Facility: CLINIC | Age: 72
End: 2017-10-04
Attending: EMERGENCY MEDICINE
Payer: MEDICARE

## 2017-10-04 ENCOUNTER — APPOINTMENT (OUTPATIENT)
Dept: CT IMAGING | Facility: CLINIC | Age: 72
End: 2017-10-04
Attending: EMERGENCY MEDICINE
Payer: MEDICARE

## 2017-10-04 ENCOUNTER — HOSPITAL ENCOUNTER (EMERGENCY)
Facility: CLINIC | Age: 72
Discharge: HOME | End: 2017-10-04
Attending: EMERGENCY MEDICINE | Admitting: EMERGENCY MEDICINE
Payer: MEDICARE

## 2017-10-04 ENCOUNTER — APPOINTMENT (OUTPATIENT)
Dept: MRI IMAGING | Facility: CLINIC | Age: 72
End: 2017-10-04
Attending: EMERGENCY MEDICINE
Payer: MEDICARE

## 2017-10-04 VITALS
TEMPERATURE: 97.5 F | DIASTOLIC BLOOD PRESSURE: 69 MMHG | RESPIRATION RATE: 20 BRPM | WEIGHT: 293 LBS | SYSTOLIC BLOOD PRESSURE: 170 MMHG | OXYGEN SATURATION: 96 % | BODY MASS INDEX: 45.99 KG/M2 | HEART RATE: 87 BPM | HEIGHT: 67 IN

## 2017-10-04 DIAGNOSIS — G43.019 INTRACTABLE MIGRAINE WITHOUT AURA AND WITHOUT STATUS MIGRAINOSUS: ICD-10-CM

## 2017-10-04 LAB
ANION GAP SERPL CALCULATED.3IONS-SCNC: 5 MMOL/L (ref 3–14)
BASOPHILS # BLD AUTO: 0 10E9/L (ref 0–0.2)
BASOPHILS NFR BLD AUTO: 0.3 %
BUN SERPL-MCNC: 14 MG/DL (ref 7–30)
CALCIUM SERPL-MCNC: 9.4 MG/DL (ref 8.5–10.1)
CHLORIDE SERPL-SCNC: 100 MMOL/L (ref 94–109)
CO2 SERPL-SCNC: 33 MMOL/L (ref 20–32)
CREAT SERPL-MCNC: 1.15 MG/DL (ref 0.52–1.04)
CRP SERPL-MCNC: 15 MG/L (ref 0–8)
DIFFERENTIAL METHOD BLD: ABNORMAL
EOSINOPHIL # BLD AUTO: 0.2 10E9/L (ref 0–0.7)
EOSINOPHIL NFR BLD AUTO: 2.7 %
ERYTHROCYTE [DISTWIDTH] IN BLOOD BY AUTOMATED COUNT: 13.8 % (ref 10–15)
ERYTHROCYTE [SEDIMENTATION RATE] IN BLOOD BY WESTERGREN METHOD: 25 MM/H (ref 0–30)
GFR SERPL CREATININE-BSD FRML MDRD: 46 ML/MIN/1.7M2
GLUCOSE SERPL-MCNC: 127 MG/DL (ref 70–99)
HCT VFR BLD AUTO: 40.4 % (ref 35–47)
HGB BLD-MCNC: 13.4 G/DL (ref 11.7–15.7)
IMM GRANULOCYTES # BLD: 0 10E9/L (ref 0–0.4)
IMM GRANULOCYTES NFR BLD: 0.1 %
INTERPRETATION ECG - MUSE: NORMAL
LYMPHOCYTES # BLD AUTO: 2 10E9/L (ref 0.8–5.3)
LYMPHOCYTES NFR BLD AUTO: 25.1 %
MCH RBC QN AUTO: 27.6 PG (ref 26.5–33)
MCHC RBC AUTO-ENTMCNC: 33.2 G/DL (ref 31.5–36.5)
MCV RBC AUTO: 83 FL (ref 78–100)
MONOCYTES # BLD AUTO: 0.4 10E9/L (ref 0–1.3)
MONOCYTES NFR BLD AUTO: 5.3 %
NEUTROPHILS # BLD AUTO: 5.3 10E9/L (ref 1.6–8.3)
NEUTROPHILS NFR BLD AUTO: 66.5 %
NRBC # BLD AUTO: 0 10*3/UL
NRBC BLD AUTO-RTO: 0 /100
PLATELET # BLD AUTO: 137 10E9/L (ref 150–450)
POTASSIUM SERPL-SCNC: 3.6 MMOL/L (ref 3.4–5.3)
RBC # BLD AUTO: 4.86 10E12/L (ref 3.8–5.2)
SODIUM SERPL-SCNC: 139 MMOL/L (ref 133–144)
TROPONIN I SERPL-MCNC: <0.015 UG/L (ref 0–0.04)
WBC # BLD AUTO: 7.9 10E9/L (ref 4–11)

## 2017-10-04 PROCEDURE — 96374 THER/PROPH/DIAG INJ IV PUSH: CPT | Performed by: EMERGENCY MEDICINE

## 2017-10-04 PROCEDURE — 96372 THER/PROPH/DIAG INJ SC/IM: CPT | Mod: XS | Performed by: EMERGENCY MEDICINE

## 2017-10-04 PROCEDURE — 85652 RBC SED RATE AUTOMATED: CPT | Performed by: EMERGENCY MEDICINE

## 2017-10-04 PROCEDURE — 93010 ELECTROCARDIOGRAM REPORT: CPT | Mod: Z6 | Performed by: EMERGENCY MEDICINE

## 2017-10-04 PROCEDURE — 86140 C-REACTIVE PROTEIN: CPT | Performed by: EMERGENCY MEDICINE

## 2017-10-04 PROCEDURE — 99285 EMERGENCY DEPT VISIT HI MDM: CPT | Mod: 25 | Performed by: EMERGENCY MEDICINE

## 2017-10-04 PROCEDURE — S0166 INJ OLANZAPINE 2.5MG: HCPCS | Performed by: EMERGENCY MEDICINE

## 2017-10-04 PROCEDURE — 25000125 ZZHC RX 250: Performed by: EMERGENCY MEDICINE

## 2017-10-04 PROCEDURE — 70450 CT HEAD/BRAIN W/O DYE: CPT

## 2017-10-04 PROCEDURE — 84484 ASSAY OF TROPONIN QUANT: CPT | Performed by: EMERGENCY MEDICINE

## 2017-10-04 PROCEDURE — 71020 XR CHEST 2 VW: CPT

## 2017-10-04 PROCEDURE — 96361 HYDRATE IV INFUSION ADD-ON: CPT | Performed by: EMERGENCY MEDICINE

## 2017-10-04 PROCEDURE — 96375 TX/PRO/DX INJ NEW DRUG ADDON: CPT | Performed by: EMERGENCY MEDICINE

## 2017-10-04 PROCEDURE — 80048 BASIC METABOLIC PNL TOTAL CA: CPT | Performed by: EMERGENCY MEDICINE

## 2017-10-04 PROCEDURE — 25000128 H RX IP 250 OP 636: Performed by: EMERGENCY MEDICINE

## 2017-10-04 PROCEDURE — 70551 MRI BRAIN STEM W/O DYE: CPT

## 2017-10-04 PROCEDURE — 99284 EMERGENCY DEPT VISIT MOD MDM: CPT | Mod: 25 | Performed by: EMERGENCY MEDICINE

## 2017-10-04 PROCEDURE — 93005 ELECTROCARDIOGRAM TRACING: CPT | Performed by: EMERGENCY MEDICINE

## 2017-10-04 PROCEDURE — 85025 COMPLETE CBC W/AUTO DIFF WBC: CPT | Performed by: EMERGENCY MEDICINE

## 2017-10-04 RX ORDER — PROCHLORPERAZINE MALEATE 5 MG
5-10 TABLET ORAL EVERY 6 HOURS PRN
Qty: 10 TABLET | Refills: 0 | Status: ON HOLD | OUTPATIENT
Start: 2017-10-04 | End: 2018-07-22

## 2017-10-04 RX ORDER — OLANZAPINE 10 MG/2ML
5 INJECTION, POWDER, FOR SOLUTION INTRAMUSCULAR ONCE
Status: COMPLETED | OUTPATIENT
Start: 2017-10-04 | End: 2017-10-04

## 2017-10-04 RX ORDER — DIPHENHYDRAMINE HYDROCHLORIDE 50 MG/ML
25 INJECTION INTRAMUSCULAR; INTRAVENOUS ONCE
Status: COMPLETED | OUTPATIENT
Start: 2017-10-04 | End: 2017-10-04

## 2017-10-04 RX ADMIN — DIPHENHYDRAMINE HYDROCHLORIDE 25 MG: 50 INJECTION, SOLUTION INTRAMUSCULAR; INTRAVENOUS at 19:21

## 2017-10-04 RX ADMIN — OLANZAPINE 5 MG: 10 INJECTION, POWDER, LYOPHILIZED, FOR SOLUTION INTRAMUSCULAR at 17:25

## 2017-10-04 RX ADMIN — PROCHLORPERAZINE EDISYLATE 10 MG: 5 INJECTION INTRAMUSCULAR; INTRAVENOUS at 19:26

## 2017-10-04 RX ADMIN — SODIUM CHLORIDE 1000 ML: 9 INJECTION, SOLUTION INTRAVENOUS at 17:25

## 2017-10-04 NOTE — ED AVS SNAPSHOT
[facility], [place], Emergency Department    [address] [place] [id number]    Phone:  [phone]                                       Mariel Ribeiro   MRN: [id number]    Department:  [facility], Emergency Department   Date of Visit:  10/4/2017           After Visit Summary Signature Page     I have received my discharge instructions, and my questions have been answered. I have discussed any challenges I see with this plan with the nurse or doctor.    ..........................................................................................................................................  Patient/Patient Representative Signature      ..........................................................................................................................................  Patient Representative Print Name and Relationship to Patient    ..................................................               ................................................  Date                                            Time    ..........................................................................................................................................  Reviewed by Signature/Title    ...................................................              ..............................................  Date                                                            Time

## 2017-10-04 NOTE — ED NOTES
Pt with c/o dizziness and headache over left eyebrow, photophobia. She feels that the area is swollen. She has hx migraines and chronic back pain.
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
## 2017-10-04 NOTE — ED AVS SNAPSHOT
[facility], Emergency Department    [address] [address]    Phone:  [phone]                                       Mariel Ribeiro   MRN: [id number]    Department:  [facility], Emergency Department   Date of Visit:  10/4/2017           Patient Information     Date Of Birth          1945        Your diagnoses for this visit were:     Intractable migraine without aura and without status migrainosus        You were seen by Rafi Forrester MD.        Discharge Instructions       Please make an appointment to follow up with Your Primary Care Provider in 2-3 days.        Migraines and Cluster Headaches  Migraines and cluster headaches cause intense, throbbing pain on one side of the head. With a migraine, you may have nausea and vomiting and be sensitive to light and sound. You may also have warning signs, such as flashing lights or loss of parts of your vision, before the pain starts. Migraines are three times more common in women than men. This may be due to hormonal changes during menstruation. Typical migrains may last for 4 to 72 hours untreated.  Cluster headaches recur in groups for days, weeks, or months. The pain is centered around or behind one eye. The eye may also become red or teary, or the eyelid may droop. Migraines and cluster headaches can have many triggers.    Preventing migraines and cluster headaches  Try the following steps:    Avoid aged cheeses, nuts, beans, chocolate, red wine, or foods that contain caffeine, alcohol, tobacco, nitrates, and MSG.    Try not to skip meals.    Don t work in poor lighting.    Reduce stress as much as you can.    Get plenty of sleep each night.    Exercise regularly under your doctor s guidance.    Avoid taking headache medicines for more than 3 days, because of the risk of rebound headaches.  Relieving the pain  Try these suggestions:    Stay quiet and rest.    Use cold to numb the pain. Wrap ice or a cold can of soda in a  cloth. Hold it against the site of pain for 10 minutes. Repeat every 20 minutes.    Avoid light. Wear dark glasses, turn out lights, and close the curtains. When outdoors, wear a brimmed hat.    Drink lots of fluids. Sip caffeine-free flat soda to help relieve nausea.    See your doctor if you get migraines or cluster headaches often. There are effective medications to help treat or prevent them.    Hormone therapy. This may help women whose migraines are related to hormonal changes during menstruation.  Date Last Reviewed: 10/19/2015    5219-7827 [employer]. [address] [place] [address]. All rights reserved. This information is not intended as a substitute for professional medical care. Always follow your healthcare professional's instructions.      Future Appointments        Provider Department Dept Phone Center    12/7/2017 1:40 PM Camille Espinal OD [facility] Ophthalmology [phone] [facility]      24 Hour Appointment Hotline       To make an appointment at any [facility], call [phone] ([phone]). If you don't have a family doctor or clinic, we will help you find one. [facility] are conveniently located to serve the needs of you and your family.             Review of your medicines      START taking        Dose / Directions Last dose taken    prochlorperazine 5 MG tablet   Commonly known as:  COMPAZINE   Dose:  5-10 mg   Quantity:  10 tablet        Take 1-2 tablets (5-10 mg) by mouth every 6 hours as needed (Headache)   Refills:  0          Our records show that you are taking the medicines listed below. If these are incorrect, please call your family doctor or clinic.        Dose / Directions Last dose taken    albuterol 108 (90 BASE) MCG/ACT Inhaler   Commonly known as:  PROAIR HFA   Quantity:  1 Inhaler        INHALE 1 TO 2 PUFFS EVERY 4 TO 6 HOURS AS NEEDED   Refills:  PRN        aspirin  MG EC tablet   Dose:  325 mg   Quantity:  30 tablet        Take  1 tablet by mouth daily.   Refills:  0        atorvastatin 40 MG tablet   Commonly known as:  LIPITOR   Quantity:  90 tablet        TAKE 1 TABLET(40 MG) BY MOUTH DAILY   Refills:  3        blood glucose monitoring test strip   Commonly known as:  no brand specified   Dose:  1 strip   Quantity:  200 each        1 strip by In Vitro route 2 times daily Strips per patient's preference   Refills:  3        cyclobenzaprine 10 MG tablet   Commonly known as:  FLEXERIL   Dose:  10 mg   Quantity:  90 tablet        Take 1 tablet (10 mg) by mouth 3 times daily as needed for muscle spasms   Refills:  1        cycloSPORINE 0.05 % ophthalmic emulsion   Commonly known as:  RESTASIS   Dose:  1 drop   Quantity:  180 each        Place 1 drop into both eyes 2 times daily   Refills:  3        EPINEPHrine 0.3 MG/0.3ML injection 2-pack   Commonly known as:  EPIPEN/ADRENACLICK/or ANY BX GENERIC EQUIV   Dose:  0.3 mg   Quantity:  0.6 mL        Inject 0.3 mLs (0.3 mg) into the muscle once as needed for anaphylaxis   Refills:  0        escitalopram 20 MG tablet   Commonly known as:  LEXAPRO   Quantity:  90 tablet        TAKE 1 TABLET BY MOUTH EVERY MORNING   Refills:  1        febuxostat 40 MG Tabs tablet   Commonly known as:  ULORIC   Dose:  40 mg   Quantity:  90 tablet        Take 1 tablet (40 mg) by mouth every evening   Refills:  3        folic acid 800 MCG Tabs   Dose:  800 mcg        Take 800 mcg by mouth daily.   Refills:  0        furosemide 40 MG tablet   Commonly known as:  LASIX   Dose:  80 mg   Quantity:  120 tablet        Take 80 mg by mouth 2 times daily   Refills:  6        glimepiride 4 MG tablet   Commonly known as:  AMARYL   Quantity:  180 tablet        TAKE 1 TABLET BY MOUTH TWICE DAILY(WITH MEALS)   Refills:  0        GUAIFENESIN  MG Tbcr   Quantity:  30        Take 600 mg by mouth twice daily   Refills:  0        levothyroxine 150 MCG tablet   Commonly known as:  SYNTHROID/LEVOTHROID   Dose:  150 mcg   Quantity:  90  tablet        Take 1 tablet (150 mcg) by mouth daily   Refills:  0        LIFESCAN FINEPOINT LANCETS Misc   Quantity:  100 each        Use to test blood sugars 2 times daily or as directed.   Refills:  prn        Loteprednol Etabonate 0.5 % Oint opthalmic ointment   Commonly known as:  LOTEMAX   Dose:  1 Application   Quantity:  1 Tube        Place 1 Application (4 g) into both eyes At Bedtime   Refills:  1        * metFORMIN 500 MG 24 hr tablet   Commonly known as:  GLUCOPHAGE-XR   Dose:  500 mg        Take 500 mg by mouth 2 times daily (with meals) Reported on 5/23/2017   Refills:  0        * metFORMIN 500 MG 24 hr tablet   Commonly known as:  GLUCOPHAGE-XR   Quantity:  360 tablet        TAKE 2 TABLETS BY MOUTH AFTER BREAKFAST AND SUPPER   Refills:  0        Miconazole Nitrate 2 % Powd   Quantity:  100 g        Apply to rash on chest three times daily.  Continue for one week after the rash has resolved.   Refills:  3        niacin 500 MG CR tablet   Commonly known as:  NIASPAN   Quantity:  180 tablet        TAKE 1 TABLET(500 MG) BY MOUTH TWICE DAILY   Refills:  1        nitroFURantoin (macrocrystal-monohydrate) 100 MG capsule   Commonly known as:  MACROBID   Dose:  100 mg   Quantity:  14 capsule        Take 1 capsule (100 mg) by mouth 2 times daily   Refills:  0        nitroGLYcerin 0.4 MG sublingual tablet   Commonly known as:  NITROSTAT   Dose:  0.4 mg   Quantity:  25 tablet        Place 1 tablet (0.4 mg) under the tongue every 5 minutes as needed for chest pain As needed for chest pain.   Refills:  PRN        ONE TOUCH ULTRA (DEVICES) Misc   Quantity:  1        test blood sugar BID   Refills:  0        potassium chloride SA 10 MEQ CR tablet   Commonly known as:  K-DUR/KLOR-CON M   Dose:  20 mEq   Quantity:  120 tablet        Take 2 tablets (20 mEq) by mouth 2 times daily   Refills:  3        pregabalin 100 MG capsule   Commonly known as:  LYRICA   Dose:  100 mg   Quantity:  180 capsule        Take 1 capsule  (100 mg) by mouth 2 times daily   Refills:  2        senna-docusate 8.6-50 MG per tablet   Commonly known as:  SENOKOT-S;PERICOLACE   Dose:  1-2 tablet   Quantity:  60 tablet        Take 1-2 tablets by mouth 2 times daily as needed for constipation   Refills:  0        sitagliptin 50 MG tablet   Commonly known as:  JANUVIA   Dose:  50 mg   Quantity:  90 tablet        Take 1 tablet (50 mg) by mouth daily   Refills:  3        TIZANIDINE HCL PO   Dose:  4 mg        Take 4 mg by mouth At Bedtime   Refills:  0        traZODone 50 MG tablet   Commonly known as:  DESYREL   Dose:  150 mg   Quantity:  90 tablet        Take 3 tablets (150 mg) by mouth At Bedtime   Refills:  7        vitamin D3 5000 UNIT/ML Liqd   Dose:  67220 Units   Indication:  Liquid gel caps        Take 10,000 Units by mouth daily   Refills:  0        * Notice:  This list has 2 medication(s) that are the same as other medications prescribed for you. Read the directions carefully, and ask your doctor or other care provider to review them with you.            Prescriptions were sent or printed at these locations (1 Prescription)                   Other Prescriptions                Printed at Department/Unit printer (1 of 1)         prochlorperazine (COMPAZINE) 5 MG tablet                Procedures and tests performed during your visit     Basic metabolic panel    CBC with platelets differential    CRP inflammation    CT Head w/o Contrast    EKG 12-lead, tracing only    Erythrocyte sedimentation rate auto    MR Brain for Stroke Limited    Troponin I    XR Chest 2 Views      Orders Needing Specimen Collection     None      Pending Results     Date and Time Order Name Status Description    10/4/2017 1719 EKG 12-lead, tracing only Preliminary             Pending Culture Results     No orders found from 10/2/2017 to 10/5/2017.            Pending Results Instructions     If you had any lab results that were not finalized at the time of your Discharge, you can  call the ED Lab Result RN at [phone]. You will be contacted by this team for any positive Lab results or changes in treatment. The nurses are available 7 days a week from 10A to 6:30P.  You can leave a message 24 hours per day and they will return your call.        Thank you for choosing [facility]       Thank you for choosing [facility] for your care. Our goal is always to provide you with excellent care. Hearing back from our patients is one way we can continue to improve our services. Please take a few minutes to complete the written survey that you may receive in the mail after you visit with us. Thank you!        [email or web address] Information     [facility] gives you secure access to your electronic health record. If you see a primary care provider, you can also send messages to your care team and make appointments. If you have questions, please call your primary care clinic.  If you do not have a primary care provider, please call [phone] and they will assist you.        Care EveryWhere ID     This is your Care EveryWhere ID. This could be used by other organizations to access your [facility] medical records  [id number]        Equal Access to Services     KJ SALVADOR : Hadclark Martin, wayoseph cox, qagopal lee . So [facility] [phone].    ATENCIÓN: Si habla español, tiene a gillespie disposición servicios gratuitos de asistencia lingüística. Llame al [phone].    We comply with applicable federal civil rights laws and [place] laws. We do not discriminate on the basis of race, color, national origin, age, disability, sex, sexual orientation, or gender identity.            After Visit Summary       This is your record. Keep this with you and show to your community pharmacist(s) and doctor(s) at your next visit.

## 2017-10-05 NOTE — DISCHARGE INSTRUCTIONS
Please make an appointment to follow up with Your Primary Care Provider in 2-3 days.        Migraines and Cluster Headaches  Migraines and cluster headaches cause intense, throbbing pain on one side of the head. With a migraine, you may have nausea and vomiting and be sensitive to light and sound. You may also have warning signs, such as flashing lights or loss of parts of your vision, before the pain starts. Migraines are three times more common in women than men. This may be due to hormonal changes during menstruation. Typical migrains may last for 4 to 72 hours untreated.  Cluster headaches recur in groups for days, weeks, or months. The pain is centered around or behind one eye. The eye may also become red or teary, or the eyelid may droop. Migraines and cluster headaches can have many triggers.    Preventing migraines and cluster headaches  Try the following steps:    Avoid aged cheeses, nuts, beans, chocolate, red wine, or foods that contain caffeine, alcohol, tobacco, nitrates, and MSG.    Try not to skip meals.    Don t work in poor lighting.    Reduce stress as much as you can.    Get plenty of sleep each night.    Exercise regularly under your doctor s guidance.    Avoid taking headache medicines for more than 3 days, because of the risk of rebound headaches.  Relieving the pain  Try these suggestions:    Stay quiet and rest.    Use cold to numb the pain. Wrap ice or a cold can of soda in a cloth. Hold it against the site of pain for 10 minutes. Repeat every 20 minutes.    Avoid light. Wear dark glasses, turn out lights, and close the curtains. When outdoors, wear a brimmed hat.    Drink lots of fluids. Sip caffeine-free flat soda to help relieve nausea.    See your doctor if you get migraines or cluster headaches often. There are effective medications to help treat or prevent them.    Hormone therapy. This may help women whose migraines are related to hormonal changes during menstruation.  Date Last  Reviewed: 10/19/2015    5874-6479 [employer]. [address] [place] [address], [place] [address]. All rights reserved. This information is not intended as a substitute for professional medical care. Always follow your healthcare professional's instructions.

## 2017-11-07 ENCOUNTER — APPOINTMENT (OUTPATIENT)
Dept: CARDIOLOGY | Facility: CLINIC | Age: 72
End: 2017-11-07
Attending: EMERGENCY MEDICINE
Payer: MEDICARE

## 2017-11-07 ENCOUNTER — HOSPITAL ENCOUNTER (OUTPATIENT)
Facility: CLINIC | Age: 72
Setting detail: OBSERVATION
Discharge: HOME | End: 2017-11-08
Attending: EMERGENCY MEDICINE | Admitting: EMERGENCY MEDICINE
Payer: MEDICARE

## 2017-11-07 ENCOUNTER — APPOINTMENT (OUTPATIENT)
Dept: GENERAL RADIOLOGY | Facility: CLINIC | Age: 72
End: 2017-11-07
Attending: EMERGENCY MEDICINE
Payer: MEDICARE

## 2017-11-07 DIAGNOSIS — E83.39 HYPOPHOSPHATEMIA: Primary | ICD-10-CM

## 2017-11-07 DIAGNOSIS — R07.9 CHEST PAIN, UNSPECIFIED TYPE: ICD-10-CM

## 2017-11-07 DIAGNOSIS — G47.33 OSA (OBSTRUCTIVE SLEEP APNEA): ICD-10-CM

## 2017-11-07 DIAGNOSIS — E83.42 HYPOMAGNESEMIA: ICD-10-CM

## 2017-11-07 DIAGNOSIS — E87.6 HYPOKALEMIA: ICD-10-CM

## 2017-11-07 LAB
ANION GAP SERPL CALCULATED.3IONS-SCNC: 7 MMOL/L (ref 3–14)
BASOPHILS # BLD AUTO: 0 10E9/L (ref 0–0.2)
BASOPHILS NFR BLD AUTO: 0.3 %
BUN SERPL-MCNC: 16 MG/DL (ref 7–30)
CALCIUM SERPL-MCNC: 8.9 MG/DL (ref 8.5–10.1)
CHLORIDE SERPL-SCNC: 101 MMOL/L (ref 94–109)
CO2 SERPL-SCNC: 30 MMOL/L (ref 20–32)
CREAT SERPL-MCNC: 1.16 MG/DL (ref 0.52–1.04)
D DIMER PPP FEU-MCNC: 0.5 UG/ML FEU (ref 0–0.5)
DIFFERENTIAL METHOD BLD: ABNORMAL
EOSINOPHIL # BLD AUTO: 0.2 10E9/L (ref 0–0.7)
EOSINOPHIL NFR BLD AUTO: 2.9 %
ERYTHROCYTE [DISTWIDTH] IN BLOOD BY AUTOMATED COUNT: 13.7 % (ref 10–15)
GFR SERPL CREATININE-BSD FRML MDRD: 46 ML/MIN/1.7M2
GLUCOSE BLDC GLUCOMTR-MCNC: 264 MG/DL (ref 70–99)
GLUCOSE SERPL-MCNC: 180 MG/DL (ref 70–99)
HCT VFR BLD AUTO: 40.9 % (ref 35–47)
HGB BLD-MCNC: 13.3 G/DL (ref 11.7–15.7)
IMM GRANULOCYTES # BLD: 0 10E9/L (ref 0–0.4)
IMM GRANULOCYTES NFR BLD: 0.3 %
LYMPHOCYTES # BLD AUTO: 1.9 10E9/L (ref 0.8–5.3)
LYMPHOCYTES NFR BLD AUTO: 24.1 %
MAGNESIUM SERPL-MCNC: 1.6 MG/DL (ref 1.6–2.3)
MCH RBC QN AUTO: 27.4 PG (ref 26.5–33)
MCHC RBC AUTO-ENTMCNC: 32.5 G/DL (ref 31.5–36.5)
MCV RBC AUTO: 84 FL (ref 78–100)
MONOCYTES # BLD AUTO: 0.5 10E9/L (ref 0–1.3)
MONOCYTES NFR BLD AUTO: 5.8 %
NEUTROPHILS # BLD AUTO: 5.3 10E9/L (ref 1.6–8.3)
NEUTROPHILS NFR BLD AUTO: 66.6 %
NRBC # BLD AUTO: 0 10*3/UL
NRBC BLD AUTO-RTO: 0 /100
NT-PROBNP SERPL-MCNC: 83 PG/ML (ref 0–900)
PLATELET # BLD AUTO: 144 10E9/L (ref 150–450)
POTASSIUM SERPL-SCNC: 3 MMOL/L (ref 3.4–5.3)
POTASSIUM SERPL-SCNC: 3.7 MMOL/L (ref 3.4–5.3)
RBC # BLD AUTO: 4.86 10E12/L (ref 3.8–5.2)
SODIUM SERPL-SCNC: 139 MMOL/L (ref 133–144)
TROPONIN I SERPL-MCNC: <0.015 UG/L (ref 0–0.04)
WBC # BLD AUTO: 8 10E9/L (ref 4–11)

## 2017-11-07 PROCEDURE — 25500064 ZZH RX 255 OP 636: Performed by: EMERGENCY MEDICINE

## 2017-11-07 PROCEDURE — A9270 NON-COVERED ITEM OR SERVICE: HCPCS | Mod: GY | Performed by: NURSE PRACTITIONER

## 2017-11-07 PROCEDURE — 93005 ELECTROCARDIOGRAM TRACING: CPT | Performed by: EMERGENCY MEDICINE

## 2017-11-07 PROCEDURE — 83735 ASSAY OF MAGNESIUM: CPT | Performed by: EMERGENCY MEDICINE

## 2017-11-07 PROCEDURE — 93306 TTE W/DOPPLER COMPLETE: CPT | Mod: 26 | Performed by: INTERNAL MEDICINE

## 2017-11-07 PROCEDURE — 83880 ASSAY OF NATRIURETIC PEPTIDE: CPT | Performed by: PHYSICIAN ASSISTANT

## 2017-11-07 PROCEDURE — 84484 ASSAY OF TROPONIN QUANT: CPT | Performed by: NURSE PRACTITIONER

## 2017-11-07 PROCEDURE — 25000132 ZZH RX MED GY IP 250 OP 250 PS 637: Mod: GY | Performed by: EMERGENCY MEDICINE

## 2017-11-07 PROCEDURE — 85025 COMPLETE CBC W/AUTO DIFF WBC: CPT | Performed by: EMERGENCY MEDICINE

## 2017-11-07 PROCEDURE — 96372 THER/PROPH/DIAG INJ SC/IM: CPT

## 2017-11-07 PROCEDURE — 84484 ASSAY OF TROPONIN QUANT: CPT | Mod: 91 | Performed by: PHYSICIAN ASSISTANT

## 2017-11-07 PROCEDURE — 00000146 ZZHCL STATISTIC GLUCOSE BY METER IP

## 2017-11-07 PROCEDURE — 84484 ASSAY OF TROPONIN QUANT: CPT | Performed by: EMERGENCY MEDICINE

## 2017-11-07 PROCEDURE — 71020 XR CHEST 2 VW: CPT

## 2017-11-07 PROCEDURE — 25000132 ZZH RX MED GY IP 250 OP 250 PS 637: Mod: GY | Performed by: NURSE PRACTITIONER

## 2017-11-07 PROCEDURE — 99220 ZZC INITIAL OBSERVATION CARE,LEVL III: CPT | Mod: Z6 | Performed by: EMERGENCY MEDICINE

## 2017-11-07 PROCEDURE — 99285 EMERGENCY DEPT VISIT HI MDM: CPT | Mod: 25 | Performed by: EMERGENCY MEDICINE

## 2017-11-07 PROCEDURE — 85379 FIBRIN DEGRADATION QUANT: CPT | Performed by: EMERGENCY MEDICINE

## 2017-11-07 PROCEDURE — 80048 BASIC METABOLIC PNL TOTAL CA: CPT | Performed by: EMERGENCY MEDICINE

## 2017-11-07 PROCEDURE — 84132 ASSAY OF SERUM POTASSIUM: CPT | Mod: 91 | Performed by: PHYSICIAN ASSISTANT

## 2017-11-07 PROCEDURE — 36415 COLL VENOUS BLD VENIPUNCTURE: CPT | Performed by: PHYSICIAN ASSISTANT

## 2017-11-07 PROCEDURE — A9270 NON-COVERED ITEM OR SERVICE: HCPCS | Mod: GY | Performed by: EMERGENCY MEDICINE

## 2017-11-07 PROCEDURE — 40000275 ZZH STATISTIC RCP TIME EA 10 MIN: Performed by: OPTOMETRIST

## 2017-11-07 PROCEDURE — 40000264 ECHO COMPLETE WITH OPTISON

## 2017-11-07 PROCEDURE — G0378 HOSPITAL OBSERVATION PER HR: HCPCS

## 2017-11-07 PROCEDURE — 93010 ELECTROCARDIOGRAM REPORT: CPT | Mod: Z6 | Performed by: EMERGENCY MEDICINE

## 2017-11-07 RX ORDER — POTASSIUM CHLORIDE 1.5 G/1.58G
20-40 POWDER, FOR SOLUTION ORAL
Status: DISCONTINUED | OUTPATIENT
Start: 2017-11-07 | End: 2017-11-08 | Stop reason: HOSPADM

## 2017-11-07 RX ORDER — ACETAMINOPHEN 325 MG/1
650 TABLET ORAL 3 TIMES DAILY
Status: DISCONTINUED | OUTPATIENT
Start: 2017-11-07 | End: 2017-11-07

## 2017-11-07 RX ORDER — EPINEPHRINE 1 MG/ML
0.3 INJECTION, SOLUTION, CONCENTRATE INTRAVENOUS
Status: DISCONTINUED | OUTPATIENT
Start: 2017-11-07 | End: 2017-11-08 | Stop reason: HOSPADM

## 2017-11-07 RX ORDER — POTASSIUM CHLORIDE 750 MG/1
20-40 TABLET, EXTENDED RELEASE ORAL
Status: DISCONTINUED | OUTPATIENT
Start: 2017-11-07 | End: 2017-11-08 | Stop reason: HOSPADM

## 2017-11-07 RX ORDER — POTASSIUM CL/LIDO/0.9 % NACL 10MEQ/0.1L
10 INTRAVENOUS SOLUTION, PIGGYBACK (ML) INTRAVENOUS
Status: DISCONTINUED | OUTPATIENT
Start: 2017-11-07 | End: 2017-11-08 | Stop reason: HOSPADM

## 2017-11-07 RX ORDER — GLIMEPIRIDE 4 MG/1
4 TABLET ORAL
Status: DISCONTINUED | OUTPATIENT
Start: 2017-11-07 | End: 2017-11-07

## 2017-11-07 RX ORDER — AMOXICILLIN 250 MG
1-2 CAPSULE ORAL 2 TIMES DAILY
Status: DISCONTINUED | OUTPATIENT
Start: 2017-11-07 | End: 2017-11-08 | Stop reason: HOSPADM

## 2017-11-07 RX ORDER — DEXTROSE MONOHYDRATE 25 G/50ML
25-50 INJECTION, SOLUTION INTRAVENOUS
Status: DISCONTINUED | OUTPATIENT
Start: 2017-11-07 | End: 2017-11-08 | Stop reason: HOSPADM

## 2017-11-07 RX ORDER — NIACIN 500 MG/1
500 TABLET, EXTENDED RELEASE ORAL AT BEDTIME
Status: DISCONTINUED | OUTPATIENT
Start: 2017-11-07 | End: 2017-11-07

## 2017-11-07 RX ORDER — CYCLOBENZAPRINE HCL 10 MG
10 TABLET ORAL 3 TIMES DAILY PRN
Status: DISCONTINUED | OUTPATIENT
Start: 2017-11-07 | End: 2017-11-08 | Stop reason: HOSPADM

## 2017-11-07 RX ORDER — POTASSIUM CHLORIDE 1.5 G/1.58G
40 POWDER, FOR SOLUTION ORAL ONCE
Status: COMPLETED | OUTPATIENT
Start: 2017-11-07 | End: 2017-11-07

## 2017-11-07 RX ORDER — ATORVASTATIN CALCIUM 40 MG/1
40 TABLET, FILM COATED ORAL DAILY
Status: DISCONTINUED | OUTPATIENT
Start: 2017-11-07 | End: 2017-11-08 | Stop reason: HOSPADM

## 2017-11-07 RX ORDER — POTASSIUM CHLORIDE 7.45 MG/ML
10 INJECTION INTRAVENOUS
Status: DISCONTINUED | OUTPATIENT
Start: 2017-11-07 | End: 2017-11-08 | Stop reason: HOSPADM

## 2017-11-07 RX ORDER — METFORMIN HCL 500 MG
500 TABLET, EXTENDED RELEASE 24 HR ORAL 2 TIMES DAILY WITH MEALS
Status: DISCONTINUED | OUTPATIENT
Start: 2017-11-07 | End: 2017-11-07

## 2017-11-07 RX ORDER — PREGABALIN 100 MG/1
100 CAPSULE ORAL 2 TIMES DAILY
Status: DISCONTINUED | OUTPATIENT
Start: 2017-11-07 | End: 2017-11-08 | Stop reason: HOSPADM

## 2017-11-07 RX ORDER — NITROGLYCERIN 0.4 MG/1
0.4 TABLET SUBLINGUAL EVERY 5 MIN PRN
Status: DISCONTINUED | OUTPATIENT
Start: 2017-11-07 | End: 2017-11-08 | Stop reason: HOSPADM

## 2017-11-07 RX ORDER — PROCHLORPERAZINE MALEATE 5 MG
5-10 TABLET ORAL EVERY 6 HOURS PRN
Status: DISCONTINUED | OUTPATIENT
Start: 2017-11-07 | End: 2017-11-08 | Stop reason: HOSPADM

## 2017-11-07 RX ORDER — MAGNESIUM SULFATE HEPTAHYDRATE 40 MG/ML
4 INJECTION, SOLUTION INTRAVENOUS EVERY 4 HOURS PRN
Status: DISCONTINUED | OUTPATIENT
Start: 2017-11-07 | End: 2017-11-08 | Stop reason: HOSPADM

## 2017-11-07 RX ORDER — ASPIRIN 81 MG/1
162 TABLET, CHEWABLE ORAL ONCE
Status: DISCONTINUED | OUTPATIENT
Start: 2017-11-07 | End: 2017-11-07

## 2017-11-07 RX ORDER — ALUMINA, MAGNESIA, AND SIMETHICONE 2400; 2400; 240 MG/30ML; MG/30ML; MG/30ML
15-30 SUSPENSION ORAL EVERY 4 HOURS PRN
Status: DISCONTINUED | OUTPATIENT
Start: 2017-11-07 | End: 2017-11-08 | Stop reason: HOSPADM

## 2017-11-07 RX ORDER — ACETAMINOPHEN 325 MG/1
650 TABLET ORAL ONCE
Status: COMPLETED | OUTPATIENT
Start: 2017-11-07 | End: 2017-11-07

## 2017-11-07 RX ORDER — POTASSIUM CHLORIDE 29.8 MG/ML
20 INJECTION INTRAVENOUS
Status: DISCONTINUED | OUTPATIENT
Start: 2017-11-07 | End: 2017-11-07 | Stop reason: RX

## 2017-11-07 RX ORDER — AMOXICILLIN 250 MG
1-2 CAPSULE ORAL 2 TIMES DAILY PRN
Status: DISCONTINUED | OUTPATIENT
Start: 2017-11-07 | End: 2017-11-07

## 2017-11-07 RX ORDER — ACETAMINOPHEN 650 MG/1
650 SUPPOSITORY RECTAL 3 TIMES DAILY
Status: DISCONTINUED | OUTPATIENT
Start: 2017-11-07 | End: 2017-11-07

## 2017-11-07 RX ORDER — FEBUXOSTAT 40 MG/1
40 TABLET, FILM COATED ORAL EVERY EVENING
Status: DISCONTINUED | OUTPATIENT
Start: 2017-11-07 | End: 2017-11-08 | Stop reason: HOSPADM

## 2017-11-07 RX ORDER — ESCITALOPRAM OXALATE 10 MG/1
20 TABLET ORAL DAILY
Status: DISCONTINUED | OUTPATIENT
Start: 2017-11-07 | End: 2017-11-08 | Stop reason: HOSPADM

## 2017-11-07 RX ORDER — NALOXONE HYDROCHLORIDE 0.4 MG/ML
.1-.4 INJECTION, SOLUTION INTRAMUSCULAR; INTRAVENOUS; SUBCUTANEOUS
Status: DISCONTINUED | OUTPATIENT
Start: 2017-11-07 | End: 2017-11-08 | Stop reason: HOSPADM

## 2017-11-07 RX ORDER — ACETAMINOPHEN 325 MG/1
650 TABLET ORAL EVERY 4 HOURS PRN
Status: DISCONTINUED | OUTPATIENT
Start: 2017-11-07 | End: 2017-11-08 | Stop reason: HOSPADM

## 2017-11-07 RX ORDER — POLYETHYLENE GLYCOL 3350 17 G/17G
17 POWDER, FOR SOLUTION ORAL DAILY
Status: DISCONTINUED | OUTPATIENT
Start: 2017-11-07 | End: 2017-11-08 | Stop reason: HOSPADM

## 2017-11-07 RX ORDER — FUROSEMIDE 40 MG
80 TABLET ORAL 2 TIMES DAILY
Status: DISCONTINUED | OUTPATIENT
Start: 2017-11-07 | End: 2017-11-08 | Stop reason: HOSPADM

## 2017-11-07 RX ORDER — GUAIFENESIN 600 MG/1
600 TABLET, EXTENDED RELEASE ORAL 2 TIMES DAILY
Status: DISCONTINUED | OUTPATIENT
Start: 2017-11-07 | End: 2017-11-08 | Stop reason: HOSPADM

## 2017-11-07 RX ORDER — ASPIRIN 81 MG/1
81 TABLET ORAL DAILY
Status: DISCONTINUED | OUTPATIENT
Start: 2017-11-08 | End: 2017-11-07

## 2017-11-07 RX ORDER — ACETAMINOPHEN 650 MG/1
650 SUPPOSITORY RECTAL EVERY 4 HOURS PRN
Status: DISCONTINUED | OUTPATIENT
Start: 2017-11-07 | End: 2017-11-08 | Stop reason: HOSPADM

## 2017-11-07 RX ORDER — ESCITALOPRAM OXALATE 10 MG/1
20 TABLET ORAL EVERY MORNING
Status: DISCONTINUED | OUTPATIENT
Start: 2017-11-08 | End: 2017-11-07

## 2017-11-07 RX ORDER — LIDOCAINE 40 MG/G
CREAM TOPICAL
Status: DISCONTINUED | OUTPATIENT
Start: 2017-11-07 | End: 2017-11-08 | Stop reason: HOSPADM

## 2017-11-07 RX ORDER — NICOTINE POLACRILEX 4 MG
15-30 LOZENGE BUCCAL
Status: DISCONTINUED | OUTPATIENT
Start: 2017-11-07 | End: 2017-11-08 | Stop reason: HOSPADM

## 2017-11-07 RX ADMIN — LEVOTHYROXINE SODIUM 175 MCG: 25 TABLET ORAL at 20:45

## 2017-11-07 RX ADMIN — FUROSEMIDE 80 MG: 40 TABLET ORAL at 20:46

## 2017-11-07 RX ADMIN — POTASSIUM CHLORIDE 40 MEQ: 1.5 POWDER, FOR SOLUTION ORAL at 13:19

## 2017-11-07 RX ADMIN — SENNOSIDES AND DOCUSATE SODIUM 1 TABLET: 8.6; 5 TABLET ORAL at 20:46

## 2017-11-07 RX ADMIN — Medication 500 MG: at 20:44

## 2017-11-07 RX ADMIN — FEBUXOSTAT 40 MG: 40 TABLET ORAL at 20:45

## 2017-11-07 RX ADMIN — ACETAMINOPHEN 650 MG: 325 TABLET ORAL at 17:00

## 2017-11-07 RX ADMIN — TRAZODONE HYDROCHLORIDE 150 MG: 100 TABLET, FILM COATED ORAL at 20:57

## 2017-11-07 RX ADMIN — ATORVASTATIN CALCIUM 40 MG: 40 TABLET, FILM COATED ORAL at 20:47

## 2017-11-07 RX ADMIN — PREGABALIN 100 MG: 100 CAPSULE ORAL at 22:13

## 2017-11-07 RX ADMIN — ESCITALOPRAM OXALATE 20 MG: 10 TABLET ORAL at 20:46

## 2017-11-07 RX ADMIN — GUAIFENESIN 600 MG: 600 TABLET, EXTENDED RELEASE ORAL at 20:47

## 2017-11-07 RX ADMIN — TIZANIDINE 4 MG: 4 TABLET ORAL at 20:48

## 2017-11-07 RX ADMIN — HUMAN ALBUMIN MICROSPHERES AND PERFLUTREN 5 ML: 10; .22 INJECTION, SOLUTION INTRAVENOUS at 15:33

## 2017-11-07 ASSESSMENT — ENCOUNTER SYMPTOMS
DIFFICULTY URINATING: 0
COUGH: 0
AGITATION: 0
SHORTNESS OF BREATH: 0
NECK PAIN: 0
ABDOMINAL PAIN: 0
NECK STIFFNESS: 0
LIGHT-HEADEDNESS: 0
NAUSEA: 0
COLOR CHANGE: 0
ADENOPATHY: 0
VOMITING: 0
FEVER: 0
BACK PAIN: 0
POLYDIPSIA: 0
BRUISES/BLEEDS EASILY: 0
CHILLS: 0

## 2017-11-07 ASSESSMENT — ACTIVITIES OF DAILY LIVING (ADL)
TOILETING: 1-->ASSISTIVE EQUIPMENT
RETIRED_EATING: 0-->INDEPENDENT
NUMBER_OF_TIMES_PATIENT_HAS_FALLEN_WITHIN_LAST_SIX_MONTHS: 1
BATHING: 1-->ASSISTIVE EQUIPMENT
SWALLOWING: 0-->SWALLOWS FOODS/LIQUIDS WITHOUT DIFFICULTY
COGNITION: 0 - NO COGNITION ISSUES REPORTED
AMBULATION: 1-->ASSISTIVE EQUIPMENT
FALL_HISTORY_WITHIN_LAST_SIX_MONTHS: YES
RETIRED_COMMUNICATION: 0-->UNDERSTANDS/COMMUNICATES WITHOUT DIFFICULTY
TRANSFERRING: 1-->ASSISTIVE EQUIPMENT
DRESS: 0-->INDEPENDENT

## 2017-11-07 NOTE — IP AVS SNAPSHOT
[address] Observation [address] [address]    Phone:  [phone]    Fax:  [phone]                                       After Visit Summary   11/7/2017    Mariel Ribeiro    MRN: [id number]           After Visit Summary Signature Page     I have received my discharge instructions, and my questions have been answered. I have discussed any challenges I see with this plan with the nurse or doctor.    ..........................................................................................................................................  Patient/Patient Representative Signature      ..........................................................................................................................................  Patient Representative Print Name and Relationship to Patient    ..................................................               ................................................  Date                                            Time    ..........................................................................................................................................  Reviewed by Signature/Title    ...................................................              ..............................................  Date                                                            Time
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
## 2017-11-07 NOTE — ED NOTES
Bed: ED22  Expected date: 11/7/17  Expected time: 10:48 AM  Means of arrival: Ambulance  Comments:  [place] [employer] [facility]  73yo F chest pain  3 nitro and 324 ASA given

## 2017-11-07 NOTE — IP AVS SNAPSHOT
MRN:[id number]                      After Visit Summary   11/7/2017    Mariel Ribeiro    MRN: [id number]           Thank you!     Thank you for choosing [facility] for your care. Our goal is always to provide you with excellent care. Hearing back from our patients is one way we can continue to improve our services. Please take a few minutes to complete the written survey that you may receive in the mail after you visit with us. Thank you!        Patient Information     Date Of Birth          1945        About your hospital stay     You were admitted on:  November 7, 2017 You last received care in the:  [facility]    You were discharged on:  November 8, 2017        Reason for your hospital stay       Chest pain.                  Who to Call     For medical emergencies, please call 911.  For non-urgent questions about your medical care, please call your primary care provider or clinic, [phone]          Attending Provider     Provider Specialty    Maurisio Travis MD Emergency Medicine    Jeremy, Fredy Fernandez MD Emergency Medicine       Primary Care Provider Office Phone # Fax #    Rafaela William [phone]       When to contact your care team       Please return to the ED if your chest pain worsens, you have pain radiating to the left arm or jaw or if you are short of breath. Follow up with your primary care provider in 1 week.                  After Care Instructions     Activity       No activity restrictions.            Diet       Regular diet.            Discharge Instructions       You were admitted for chest pain. Troponin, a cardiac marker that shows if there was an injury to the heart was negative. Your EKG and chest x-ray were negative. Your cardiac stress test was negative. Discuss anti reflux medications with your primary care provider.                  Follow-up Appointments     Adult [facility] Follow-up and recommended labs and  tests       Follow up with your primary care provider in 1 week.  Recommend a sleep study at the sleep study center. Phone: [phone]    Appointments on [place] and/or [facility] (with [facility] or [facility] provider or service). Call [phone] if you haven't heard regarding these appointments within 7 days of discharge.            Follow Up and recommended labs and tests       Potassium, Phosphorous, and Magnesium need to be rechecked when you see your primary care provider in 1 week.                  Your next 10 appointments already scheduled     Dec 07, 2017  1:40 PM CST   (Arrive by 1:25 PM)   RETURN GENERAL with Camille Espinal OD   [facility] ([facility])    [address]  4th Floor  [facility] [address] [phone]              Additional Services     SLEEP EVALUATION & MANAGEMENT REFERRAL - [facility]  [phone] (Age 2 and up)       Please be aware that coverage of these services is subject to the terms and limitations of your health insurance plan.  Call member services at your health plan with any benefit or coverage questions.      Please bring the following to your appointment:    >>   List of current medications   >>   This referral request   >>   Any documents/labs given to you for this referral                      Future tests that were ordered for you     Phosphorus       Draw with Potassium and magnesium            Magnesium           Potassium                 Pending Results     No orders found for last 3 day(s).            Statement of Approval     Ordered          11/08/17 1703  I have reviewed and agree with all the recommendations and orders detailed in this document.  EFFECTIVE NOW     Approved and electronically signed by:  Tricia Escalante APRN CNP             Admission Information     Date & Time Provider Department Dept. Phone    11/7/2017 Fredy Luna,  MD Unit [facility]D Observation [facility] [phone]      Your Vitals Were     Blood Pressure Pulse Temperature Respirations Weight Pulse Oximetry    156/69 82 98.1  F (36.7  C) (Oral) 17 134.3 kg (296 lb 1.2 oz) 93%    BMI (Body Mass Index)                   47.07 kg/m2           [facility] Information     [facility] gives you secure access to your electronic health record. If you see a primary care provider, you can also send messages to your care team and make appointments. If you have questions, please call your primary care clinic.  If you do not have a primary care provider, please call [phone] and they will assist you.        Care EveryWhere ID     This is your Care EveryWhere ID. This could be used by other organizations to access your [place] medical records  [id number]        Equal Access to Services     KJ SALVADOR : Marcelina Martin, dario cox, hector taylor, gopal bird. So [facility] [phone].    ATENCIÓN: Si habla español, tiene a gillespie disposición servicios gratuitos de asistencia lingüística. Llame al [phone].    We comply with applicable federal civil rights laws and [place] laws. We do not discriminate on the basis of race, color, national origin, age, disability, sex, sexual orientation, or gender identity.               Review of your medicines      UNREVIEWED medicines. Ask your doctor about these medicines        Dose / Directions    Loteprednol Etabonate 0.5 % Oint opthalmic ointment   Commonly known as:  LOTEMAX   Used for:  Dry eye, Keratitis sicca, bilateral        Dose:  1 Application   Place 1 Application (4 g) into both eyes At Bedtime   Quantity:  1 Tube   Refills:  1         CONTINUE these medicines which have NOT CHANGED        Dose / Directions    albuterol 108 (90 BASE) MCG/ACT Inhaler   Commonly known as:  PROAIR HFA   Used for:  Chronic obstructive pulmonary disease with acute exacerbation (H)        INHALE 1 TO 2  PUFFS EVERY 4 TO 6 HOURS AS NEEDED   Quantity:  1 Inhaler   Refills:  PRN       aspirin  MG EC tablet        Dose:  325 mg   Take 1 tablet by mouth daily.   Quantity:  30 tablet   Refills:  0       atorvastatin 40 MG tablet   Commonly known as:  LIPITOR   Used for:  Hyperlipidemia with target LDL less than 70        TAKE 1 TABLET(40 MG) BY MOUTH DAILY   Quantity:  90 tablet   Refills:  3       blood glucose monitoring test strip   Commonly known as:  no brand specified        Dose:  1 strip   1 strip by In Vitro route 2 times daily Strips per patient's preference   Quantity:  200 each   Refills:  3       cyclobenzaprine 10 MG tablet   Commonly known as:  FLEXERIL   Used for:  Acute right-sided thoracic back pain        Dose:  10 mg   Take 1 tablet (10 mg) by mouth 3 times daily as needed for muscle spasms   Quantity:  90 tablet   Refills:  1       cycloSPORINE 0.05 % ophthalmic emulsion   Commonly known as:  RESTASIS   Used for:  Keratitis sicca, bilateral        Dose:  1 drop   Place 1 drop into both eyes 2 times daily   Quantity:  180 each   Refills:  3       EPINEPHrine 0.3 MG/0.3ML injection 2-pack   Commonly known as:  EPIPEN/ADRENACLICK/or ANY BX GENERIC EQUIV   Used for:  Allergic reaction, subsequent encounter        Dose:  0.3 mg   Inject 0.3 mLs (0.3 mg) into the muscle once as needed for anaphylaxis   Quantity:  0.6 mL   Refills:  0       escitalopram 20 MG tablet   Commonly known as:  LEXAPRO   Used for:  Major depressive disorder, single episode, in partial remission (H)        TAKE 1 TABLET BY MOUTH EVERY MORNING   Quantity:  90 tablet   Refills:  1       febuxostat 40 MG Tabs tablet   Commonly known as:  ULORIC   Used for:  Hyperuricemia        Dose:  40 mg   Take 1 tablet (40 mg) by mouth every evening   Quantity:  90 tablet   Refills:  3       folic acid 800 MCG Tabs        Dose:  800 mcg   Take 800 mcg by mouth daily.   Refills:  0       furosemide 40 MG tablet   Commonly known as:  LASIX    Used for:  Chronic diastolic heart failure (H)        Dose:  80 mg   Take 80 mg by mouth 2 times daily   Quantity:  120 tablet   Refills:  6       glimepiride 4 MG tablet   Commonly known as:  AMARYL   Used for:  Type 2 diabetes mellitus with chronic kidney disease, without long-term current use of insulin, unspecified CKD stage (H)        TAKE 1 TABLET BY MOUTH TWICE DAILY(WITH MEALS)   Quantity:  180 tablet   Refills:  3       GUAIFENESIN  MG Tbcr        Take 600 mg by mouth twice daily   Quantity:  30   Refills:  0       levothyroxine 150 MCG tablet   Commonly known as:  SYNTHROID/LEVOTHROID   Used for:  Hypothyroidism, unspecified type        Dose:  150 mcg   Take 1 tablet (150 mcg) by mouth daily   Quantity:  90 tablet   Refills:  0       NavigatorMD LANCETS Misc   Used for:  Type 2 diabetes, HbA1C goal < 8% (H)        Use to test blood sugars 2 times daily or as directed.   Quantity:  100 each   Refills:  prn       * metFORMIN 500 MG 24 hr tablet   Commonly known as:  GLUCOPHAGE-XR        Dose:  500 mg   Take 500 mg by mouth 2 times daily (with meals) Reported on 5/23/2017   Refills:  0       * metFORMIN 500 MG 24 hr tablet   Commonly known as:  GLUCOPHAGE-XR   Used for:  Type 2 diabetes mellitus with diabetic polyneuropathy, without long-term current use of insulin (H)        TAKE 2 TABLETS BY MOUTH AFTER BREAKFAST AND SUPPER   Quantity:  360 tablet   Refills:  0       Miconazole Nitrate 2 % Powd   Used for:  Intertrigo        Apply to rash on chest three times daily.  Continue for one week after the rash has resolved.   Quantity:  100 g   Refills:  3       niacin 500 MG CR tablet   Commonly known as:  NIASPAN   Used for:  Hyperlipidemia with target LDL less than 70        TAKE 1 TABLET(500 MG) BY MOUTH TWICE DAILY   Quantity:  180 tablet   Refills:  1       nitroFURantoin (macrocrystal-monohydrate) 100 MG capsule   Commonly known as:  MACROBID   Used for:  Dysuria        Dose:  100 mg   Take 1  capsule (100 mg) by mouth 2 times daily   Quantity:  14 capsule   Refills:  0       nitroGLYcerin 0.4 MG sublingual tablet   Commonly known as:  NITROSTAT   Used for:  Coronary atherosclerosis of unspecified type of vessel, native or graft        Dose:  0.4 mg   Place 1 tablet (0.4 mg) under the tongue every 5 minutes as needed for chest pain As needed for chest pain.   Quantity:  25 tablet   Refills:  PRN       ONE TOUCH ULTRA (DEVICES) Misc   Used for:  Type II or unspecified type diabetes mellitus without mention of complication, not stated as uncontrolled        test blood sugar BID   Quantity:  1   Refills:  0       potassium chloride SA 10 MEQ CR tablet   Commonly known as:  K-DUR/KLOR-CON M   Used for:  Hypokalemia        Dose:  20 mEq   Take 2 tablets (20 mEq) by mouth 2 times daily   Quantity:  120 tablet   Refills:  3       pregabalin 100 MG capsule   Commonly known as:  LYRICA   Used for:  Myalgia        Dose:  100 mg   Take 1 capsule (100 mg) by mouth 2 times daily   Quantity:  180 capsule   Refills:  2       prochlorperazine 5 MG tablet   Commonly known as:  COMPAZINE        Dose:  5-10 mg   Take 1-2 tablets (5-10 mg) by mouth every 6 hours as needed (Headache)   Quantity:  10 tablet   Refills:  0       senna-docusate 8.6-50 MG per tablet   Commonly known as:  SENOKOT-S;PERICOLACE   Used for:  Constipation        Dose:  1-2 tablet   Take 1-2 tablets by mouth 2 times daily as needed for constipation   Quantity:  60 tablet   Refills:  0       sitagliptin 50 MG tablet   Commonly known as:  JANUVIA   Used for:  Type 2 diabetes mellitus with chronic kidney disease, without long-term current use of insulin, unspecified CKD stage (H)        Dose:  50 mg   Take 1 tablet (50 mg) by mouth daily   Quantity:  90 tablet   Refills:  3       TIZANIDINE HCL PO        Dose:  4 mg   Take 4 mg by mouth At Bedtime   Refills:  0       traZODone 50 MG tablet   Commonly known as:  DESYREL   Used for:  Insomnia, unspecified         Dose:  150 mg   Take 3 tablets (150 mg) by mouth At Bedtime   Quantity:  90 tablet   Refills:  7       vitamin D3 5000 UNIT/ML Liqd   Indication:  Liquid gel caps        Dose:  39878 Units   Take 10,000 Units by mouth daily   Refills:  0       * Notice:  This list has 2 medication(s) that are the same as other medications prescribed for you. Read the directions carefully, and ask your doctor or other care provider to review them with you.             Protect others around you: Learn how to safely use, store and throw away your medicines at www.disposemymeds.org.             Medication List: This is a list of all your medications and when to take them. Check marks below indicate your daily home schedule. Keep this list as a reference.      Medications           Morning Afternoon Evening Bedtime As Needed    albuterol 108 (90 BASE) MCG/ACT Inhaler   Commonly known as:  PROAIR HFA   INHALE 1 TO 2 PUFFS EVERY 4 TO 6 HOURS AS NEEDED                                aspirin  MG EC tablet   Take 1 tablet by mouth daily.   Last time this was given:  325 mg on 11/8/2017 11:01 AM                                atorvastatin 40 MG tablet   Commonly known as:  LIPITOR   TAKE 1 TABLET(40 MG) BY MOUTH DAILY   Last time this was given:  40 mg on 11/8/2017 11:01 AM                                blood glucose monitoring test strip   Commonly known as:  no brand specified   1 strip by In Vitro route 2 times daily Strips per patient's preference                                cyclobenzaprine 10 MG tablet   Commonly known as:  FLEXERIL   Take 1 tablet (10 mg) by mouth 3 times daily as needed for muscle spasms                                cycloSPORINE 0.05 % ophthalmic emulsion   Commonly known as:  RESTASIS   Place 1 drop into both eyes 2 times daily                                EPINEPHrine 0.3 MG/0.3ML injection 2-pack   Commonly known as:  EPIPEN/ADRENACLICK/or ANY BX GENERIC EQUIV   Inject 0.3 mLs (0.3 mg) into the  muscle once as needed for anaphylaxis                                escitalopram 20 MG tablet   Commonly known as:  LEXAPRO   TAKE 1 TABLET BY MOUTH EVERY MORNING   Last time this was given:  20 mg on 11/8/2017 11:01 AM                                febuxostat 40 MG Tabs tablet   Commonly known as:  ULORIC   Take 1 tablet (40 mg) by mouth every evening   Last time this was given:  40 mg on 11/7/2017  8:45 PM                                folic acid 800 MCG Tabs   Take 800 mcg by mouth daily.                                furosemide 40 MG tablet   Commonly known as:  LASIX   Take 80 mg by mouth 2 times daily   Last time this was given:  80 mg on 11/8/2017 11:02 AM                                glimepiride 4 MG tablet   Commonly known as:  AMARYL   TAKE 1 TABLET BY MOUTH TWICE DAILY(WITH MEALS)                                GUAIFENESIN  MG Tbcr   Take 600 mg by mouth twice daily   Last time this was given:  600 mg on 11/8/2017 11:12 AM                                levothyroxine 150 MCG tablet   Commonly known as:  SYNTHROID/LEVOTHROID   Take 1 tablet (150 mcg) by mouth daily   Last time this was given:  175 mcg on 11/8/2017 11:01 AM                                LIFESCAN FINEPOINT LANCETS Misc   Use to test blood sugars 2 times daily or as directed.                                Loteprednol Etabonate 0.5 % Oint opthalmic ointment   Commonly known as:  LOTEMAX   Place 1 Application (4 g) into both eyes At Bedtime                                * metFORMIN 500 MG 24 hr tablet   Commonly known as:  GLUCOPHAGE-XR   Take 500 mg by mouth 2 times daily (with meals) Reported on 5/23/2017                                * metFORMIN 500 MG 24 hr tablet   Commonly known as:  GLUCOPHAGE-XR   TAKE 2 TABLETS BY MOUTH AFTER BREAKFAST AND SUPPER                                Miconazole Nitrate 2 % Powd   Apply to rash on chest three times daily.  Continue for one week after the rash has resolved.                                 niacin 500 MG CR tablet   Commonly known as:  NIASPAN   TAKE 1 TABLET(500 MG) BY MOUTH TWICE DAILY                                nitroFURantoin (macrocrystal-monohydrate) 100 MG capsule   Commonly known as:  MACROBID   Take 1 capsule (100 mg) by mouth 2 times daily                                nitroGLYcerin 0.4 MG sublingual tablet   Commonly known as:  NITROSTAT   Place 1 tablet (0.4 mg) under the tongue every 5 minutes as needed for chest pain As needed for chest pain.                                ONE TOUCH ULTRA (DEVICES) Misc   test blood sugar BID                                potassium chloride SA 10 MEQ CR tablet   Commonly known as:  K-DUR/KLOR-CON M   Take 2 tablets (20 mEq) by mouth 2 times daily   Last time this was given:  40 mEq on 11/8/2017  2:30 PM                                pregabalin 100 MG capsule   Commonly known as:  LYRICA   Take 1 capsule (100 mg) by mouth 2 times daily   Last time this was given:  100 mg on 11/8/2017 11:00 AM                                prochlorperazine 5 MG tablet   Commonly known as:  COMPAZINE   Take 1-2 tablets (5-10 mg) by mouth every 6 hours as needed (Headache)                                senna-docusate 8.6-50 MG per tablet   Commonly known as:  SENOKOT-S;PERICOLACE   Take 1-2 tablets by mouth 2 times daily as needed for constipation   Last time this was given:  1 tablet on 11/8/2017 11:11 AM                                sitagliptin 50 MG tablet   Commonly known as:  JANUVIA   Take 1 tablet (50 mg) by mouth daily                                TIZANIDINE HCL PO   Take 4 mg by mouth At Bedtime   Last time this was given:  4 mg on 11/7/2017  8:48 PM                                traZODone 50 MG tablet   Commonly known as:  DESYREL   Take 3 tablets (150 mg) by mouth At Bedtime   Last time this was given:  150 mg on 11/7/2017  8:57 PM                                vitamin D3 5000 UNIT/ML Liqd   Take 10,000 Units by mouth daily                                 * Notice:  This list has 2 medication(s) that are the same as other medications prescribed for you. Read the directions carefully, and ask your doctor or other care provider to review them with you.

## 2017-11-07 NOTE — PROGRESS NOTES
Social Work: Assessment with Discharge Plan    Patient Name:  Mariel Ribeiro  :  1945  Age:  72 year old  MRN:  [id number]  Risk/Complexity Score:     Completed assessment with:  Pt, pt's roommate Odalis, and chart review    Presenting Information   Reason for Referral:  Discharge plan  Date of Intake:  2017  Referral Source:  Chart Review  Decision Maker:  Pt  Alternate Decision Maker:  HCA 1 and POA is Odalis [phone]; HCA 2 is Odalis Matthews's sister  Health Care Directive:  Copy in Chart  Living Situation:  Pt lives in a 1-level home in [place] with her roommate Odalis. She has 2 stairs to enter.  Previous Functional Status:  Independent; uses cane mainly to ambulate, also has rolling walker w/chair and regular walker.   Patient and family understanding of hospitalization:  States she had chest pain.  Cultural/Language/Spiritual Considerations:  Evangelical, english speaking  Adjustment to Illness:  Pt is alert and oriented    Physical Health  Reason for Admission:    1. Chest pain, unspecified type      Services Needed/Recommended:  Home with no services    Mental Health/Chemical Dependency  Diagnosis:  Hx of depression and insomnia. States depression is stable.  Support/Services in Place:  Used to see a psychologist, does not currently because they mutually decided she was coping well.   Services Needed/Recommended:  If coping ability changes, resume services with established psychologist.    Support System  Significant relationship at present time:  Roommate Odalis with with Mariel in ED  Family of origin is available for support:  Remarks only having friends in [place] of support  Other support available:  Odalis's sister Cassie and friend Elly  Gaps in support system:  None identified.  Patient is caregiver to:  Other:  dog, Odalis able to care for while pt in hospital     Provider Information   Primary Care Physician:  Rafaela William   [phone]   Clinic:  [address] /  [place] [address]      :  None/    Financial   Income Source:  Retired, social security  Financial Concerns:  None per pt  Insurance:    Payor/Plan Subscriber Name Rel Member # Group #   MEDICARE - MEDICARE MARIEL PALMER  [id number]       ATTN CLAIMS, [address]   COMMERCIAL - AARP MARIEL PALMER  [id number]      [facility] CLAIM DIV, [address]       Discharge Plan   Patient and family discharge goal:  To return home post-admission  Provided education on discharge plan:  YES  Patient agreeable to discharge plan:  YES  A list of Medicare Certified Facilities was provided to the patient and/or family to encourage patient choice. Patient's choices for facility are:  N/A  Will NH provide Skilled rehabilitation or complex medical:  NO  General information regarding anticipated insurance coverage and possible out of pocket cost was discussed. Patient and patient's family are aware patient may incur the cost of transportation to the facility, pending insurance payment: YES  Barriers to discharge:  Pending medical clearance    Discharge Recommendations   Anticipated Disposition:  Home, no needs identified  Transportation Needs:  Family:  Geeta able to drive home upon discharge    Additional comments   Mariel is a 72 year old female that presents to the ED with chest pain. Mariel lives with friend geeta in 1-level home. Pt has a fall alert but was concerned because she used it call 911 today, but after waiting 20 minutes and not hearing anything, she called 911 on the phone who advised they had only received notice of a call a few minutes prior. MARCO encouraged Mariel and Geeta to call the company the fall alert is from and inquire more on protocol. They asked what company they should go with and MARCO advised them to check with insurance what is in-network. They were unaware it could be covered by insurance, MARCO advised she could not guarantee that it would, but they should check Mariel's  supplement policy about this.     SW went over HCD and they advised that Mariel was fine with Odalis being HCA1 and Cassie being HCA2 but because Cassie lives in [place], she may elect someone else as HCA2. SW offered short-form HCD and they will fill out new copy and bring it in during PCP follow up appointment next week. Copy of HCD in file given to Mariel and copy printed and put in chart as currently facesheet indicates no HCD on file.    Shayna MACE, SW  ED   ED Pager: [phone]  On-call/After hours Pager: [phone]

## 2017-11-07 NOTE — ED NOTES
chest pain since awakeing at 830 this am, has taken 4 nitro .4 sl and 324 asprin, partial relief obtained.  had coughing spell last night.  hx of 2 stents, angina , copd and dm2

## 2017-11-08 ENCOUNTER — APPOINTMENT (OUTPATIENT)
Dept: NUCLEAR MEDICINE | Facility: CLINIC | Age: 72
End: 2017-11-08
Payer: MEDICARE

## 2017-11-08 ENCOUNTER — APPOINTMENT (OUTPATIENT)
Dept: CARDIOLOGY | Facility: CLINIC | Age: 72
End: 2017-11-08
Payer: MEDICARE

## 2017-11-08 VITALS
OXYGEN SATURATION: 93 % | HEART RATE: 82 BPM | WEIGHT: 293 LBS | DIASTOLIC BLOOD PRESSURE: 69 MMHG | RESPIRATION RATE: 17 BRPM | TEMPERATURE: 98.1 F | SYSTOLIC BLOOD PRESSURE: 156 MMHG | BODY MASS INDEX: 47.07 KG/M2

## 2017-11-08 LAB
ANION GAP SERPL CALCULATED.3IONS-SCNC: 9 MMOL/L (ref 3–14)
BUN SERPL-MCNC: 17 MG/DL (ref 7–30)
CALCIUM SERPL-MCNC: 8.9 MG/DL (ref 8.5–10.1)
CHLORIDE SERPL-SCNC: 105 MMOL/L (ref 94–109)
CO2 SERPL-SCNC: 30 MMOL/L (ref 20–32)
CREAT SERPL-MCNC: 1.14 MG/DL (ref 0.52–1.04)
GFR SERPL CREATININE-BSD FRML MDRD: 47 ML/MIN/1.7M2
GLUCOSE BLDC GLUCOMTR-MCNC: 136 MG/DL (ref 70–99)
GLUCOSE BLDC GLUCOMTR-MCNC: 147 MG/DL (ref 70–99)
GLUCOSE BLDC GLUCOMTR-MCNC: 150 MG/DL (ref 70–99)
GLUCOSE BLDC GLUCOMTR-MCNC: 161 MG/DL (ref 70–99)
GLUCOSE SERPL-MCNC: 166 MG/DL (ref 70–99)
INTERPRETATION ECG - MUSE: NORMAL
MAGNESIUM SERPL-MCNC: 1.5 MG/DL (ref 1.6–2.3)
PHOSPHATE SERPL-MCNC: 3 MG/DL (ref 2.5–4.5)
POTASSIUM SERPL-SCNC: 3 MMOL/L (ref 3.4–5.3)
SODIUM SERPL-SCNC: 143 MMOL/L (ref 133–144)

## 2017-11-08 PROCEDURE — 25000128 H RX IP 250 OP 636: Performed by: INTERNAL MEDICINE

## 2017-11-08 PROCEDURE — 00000146 ZZHCL STATISTIC GLUCOSE BY METER IP

## 2017-11-08 PROCEDURE — 99217 ZZC OBSERVATION CARE DISCHARGE: CPT | Mod: Z6 | Performed by: NURSE PRACTITIONER

## 2017-11-08 PROCEDURE — 25000132 ZZH RX MED GY IP 250 OP 250 PS 637: Mod: GY | Performed by: NURSE PRACTITIONER

## 2017-11-08 PROCEDURE — 96372 THER/PROPH/DIAG INJ SC/IM: CPT

## 2017-11-08 PROCEDURE — 84100 ASSAY OF PHOSPHORUS: CPT | Performed by: NURSE PRACTITIONER

## 2017-11-08 PROCEDURE — 78452 HT MUSCLE IMAGE SPECT MULT: CPT

## 2017-11-08 PROCEDURE — A9502 TC99M TETROFOSMIN: HCPCS | Performed by: INTERNAL MEDICINE

## 2017-11-08 PROCEDURE — 93016 CV STRESS TEST SUPVJ ONLY: CPT | Performed by: INTERNAL MEDICINE

## 2017-11-08 PROCEDURE — 93018 CV STRESS TEST I&R ONLY: CPT | Performed by: INTERNAL MEDICINE

## 2017-11-08 PROCEDURE — 34300033 ZZH RX 343: Performed by: INTERNAL MEDICINE

## 2017-11-08 PROCEDURE — 93017 CV STRESS TEST TRACING ONLY: CPT

## 2017-11-08 PROCEDURE — 80048 BASIC METABOLIC PNL TOTAL CA: CPT | Performed by: NURSE PRACTITIONER

## 2017-11-08 PROCEDURE — 78452 HT MUSCLE IMAGE SPECT MULT: CPT | Mod: 26 | Performed by: INTERNAL MEDICINE

## 2017-11-08 PROCEDURE — 83735 ASSAY OF MAGNESIUM: CPT | Performed by: NURSE PRACTITIONER

## 2017-11-08 PROCEDURE — 36415 COLL VENOUS BLD VENIPUNCTURE: CPT | Performed by: NURSE PRACTITIONER

## 2017-11-08 PROCEDURE — G0378 HOSPITAL OBSERVATION PER HR: HCPCS

## 2017-11-08 PROCEDURE — A9270 NON-COVERED ITEM OR SERVICE: HCPCS | Mod: GY | Performed by: NURSE PRACTITIONER

## 2017-11-08 RX ORDER — MAGNESIUM OXIDE 400 MG/1
400 TABLET ORAL ONCE
Status: COMPLETED | OUTPATIENT
Start: 2017-11-08 | End: 2017-11-08

## 2017-11-08 RX ORDER — REGADENOSON 0.08 MG/ML
0.4 INJECTION, SOLUTION INTRAVENOUS ONCE
Status: COMPLETED | OUTPATIENT
Start: 2017-11-08 | End: 2017-11-08

## 2017-11-08 RX ORDER — GLIMEPIRIDE 4 MG/1
4 TABLET ORAL
Status: DISCONTINUED | OUTPATIENT
Start: 2017-11-08 | End: 2017-11-08 | Stop reason: HOSPADM

## 2017-11-08 RX ORDER — ACYCLOVIR 200 MG/1
0-1 CAPSULE ORAL
Status: DISCONTINUED | OUTPATIENT
Start: 2017-11-08 | End: 2017-11-08

## 2017-11-08 RX ORDER — AMINOPHYLLINE 25 MG/ML
50-100 INJECTION, SOLUTION INTRAVENOUS
Status: DISCONTINUED | OUTPATIENT
Start: 2017-11-08 | End: 2017-11-08

## 2017-11-08 RX ORDER — METFORMIN HCL 500 MG
500 TABLET, EXTENDED RELEASE 24 HR ORAL 2 TIMES DAILY WITH MEALS
Status: DISCONTINUED | OUTPATIENT
Start: 2017-11-08 | End: 2017-11-08 | Stop reason: HOSPADM

## 2017-11-08 RX ORDER — POTASSIUM CHLORIDE 750 MG/1
20 TABLET, EXTENDED RELEASE ORAL ONCE
Status: COMPLETED | OUTPATIENT
Start: 2017-11-08 | End: 2017-11-08

## 2017-11-08 RX ORDER — BUDESONIDE 0.5 MG/2ML
0.5 INHALANT ORAL 2 TIMES DAILY
Status: DISCONTINUED | OUTPATIENT
Start: 2017-11-08 | End: 2017-11-08 | Stop reason: HOSPADM

## 2017-11-08 RX ORDER — ALBUTEROL SULFATE 90 UG/1
2 AEROSOL, METERED RESPIRATORY (INHALATION) EVERY 5 MIN PRN
Status: DISCONTINUED | OUTPATIENT
Start: 2017-11-08 | End: 2017-11-08

## 2017-11-08 RX ADMIN — REGADENOSON 0.4 MG: 0.08 INJECTION, SOLUTION INTRAVENOUS at 09:25

## 2017-11-08 RX ADMIN — PREGABALIN 100 MG: 100 CAPSULE ORAL at 11:00

## 2017-11-08 RX ADMIN — MAGNESIUM OXIDE TAB 400 MG (241.3 MG ELEMENTAL MG) 400 MG: 400 (241.3 MG) TAB at 16:26

## 2017-11-08 RX ADMIN — POTASSIUM CHLORIDE 20 MEQ: 750 TABLET, EXTENDED RELEASE ORAL at 17:11

## 2017-11-08 RX ADMIN — ESCITALOPRAM OXALATE 20 MG: 10 TABLET ORAL at 11:01

## 2017-11-08 RX ADMIN — ASPIRIN 325 MG: 325 TABLET, DELAYED RELEASE ORAL at 11:01

## 2017-11-08 RX ADMIN — ACETAMINOPHEN 650 MG: 325 TABLET, FILM COATED ORAL at 15:40

## 2017-11-08 RX ADMIN — LEVOTHYROXINE SODIUM 175 MCG: 25 TABLET ORAL at 11:01

## 2017-11-08 RX ADMIN — Medication 500 MG: at 11:12

## 2017-11-08 RX ADMIN — FUROSEMIDE 80 MG: 40 TABLET ORAL at 11:02

## 2017-11-08 RX ADMIN — GUAIFENESIN 600 MG: 600 TABLET, EXTENDED RELEASE ORAL at 11:12

## 2017-11-08 RX ADMIN — TETROFOSMIN 39.2 MCI.: 1.38 INJECTION, POWDER, LYOPHILIZED, FOR SOLUTION INTRAVENOUS at 09:28

## 2017-11-08 RX ADMIN — ATORVASTATIN CALCIUM 40 MG: 40 TABLET, FILM COATED ORAL at 11:01

## 2017-11-08 RX ADMIN — SENNOSIDES AND DOCUSATE SODIUM 1 TABLET: 8.6; 5 TABLET ORAL at 11:11

## 2017-11-08 RX ADMIN — POTASSIUM CHLORIDE 40 MEQ: 750 TABLET, EXTENDED RELEASE ORAL at 14:30

## 2017-11-08 RX ADMIN — TETROFOSMIN 11.6 MCI.: 1.38 INJECTION, POWDER, LYOPHILIZED, FOR SOLUTION INTRAVENOUS at 08:08

## 2017-11-08 ASSESSMENT — ENCOUNTER SYMPTOMS
DIETARY ISSUES: NPO
NO PATIENT REPORTED PAIN: 1
ACTIVITY IMPAIRMENT: NORMAL

## 2017-11-08 ASSESSMENT — PAIN DESCRIPTION - DESCRIPTORS: DESCRIPTORS: HEADACHE

## 2017-11-08 NOTE — PLAN OF CARE
Serial troponins and stress test complete. Complete  - Seen and cleared by consultant if applicable In the process of DC  - Adequate pain control on oral analgesia Yes  - Vital signs normal or at patient baseline WDL  - Safe disposition plan has been identified In the process of DC
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
## 2017-11-08 NOTE — PLAN OF CARE
Problem: Patient Care Overview  Goal: Plan of Care/Patient Progress Review  - Serial troponins complete: Yes  - Stress test complete: No, scheduled for AM  - Seen and cleared by consultant if applicable: No   - Adequate pain control on oral analgesia: Yes   - Vital signs normal or at patient baseline: Yes  - Safe disposition plan has been identified: No

## 2017-11-08 NOTE — PLAN OF CARE
Problem: Patient Care Overview  Goal: Plan of Care/Patient Progress Review  - Serial troponins complete: No, 2/3 normal   - Stress test complete: No, scheduled for AM  - Seen and cleared by consultant if applicable: No   - Adequate pain control on oral analgesia: Yes   - Vital signs normal or at patient baseline: Yes  - Safe disposition plan has been identified: No

## 2017-11-08 NOTE — PLAN OF CARE
Problem: Patient Care Overview  Goal: Plan of Care/Patient Progress Review  Outcome: No Change  Outpatient/Observation goals to be met before discharge home:  Patient is A & O x 4, up with SBA with cane.  Denies CP or SOB, dizziness, or nausea.  VSS, trops negative x 2.  Family is at bedside, able to make needs known, will continue to monitor
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
## 2017-11-08 NOTE — PLAN OF CARE
Serial troponins and stress test complete. Stress test in the works.   - Seen and cleared by consultant if applicable In the works.   - Adequate pain control on oral analgesia Yes  - Vital signs normal or at patient baseline WDL  - Safe disposition plan has been identified In the works

## 2017-11-08 NOTE — DISCHARGE SUMMARY
Discharge Summary    Mariel Ribeiro MRN# [id number]   YOB: 1945 Age: 72 year old     Date of Admission:  11/7/2017  Date of Discharge:  11/8/2017  Admitting Physician:  Maurisio Travis MD  Discharge Physician:  ALEXUS SALDIVAR MD  Discharging Service:  Emergency Department Observation Unit       Primary Provider: Rafaela William          Discharge Diagnosis:     * No active hospital problems. *    * No resolved hospital problems. *               Discharge Disposition:   Discharged to home           Condition on Discharge:   Discharge condition: Stable               Procedures:   Cardiology procedures perfromed:   Nuclear stress testing               Discharge Medications:   Current Discharge Medication List      CONTINUE these medications which have NOT CHANGED    Details   aspirin  MG tablet Take 1 tablet by mouth daily.  Qty: 30 tablet, Refills: 0      glimepiride (AMARYL) 4 MG tablet TAKE 1 TABLET BY MOUTH TWICE DAILY(WITH MEALS)  Qty: 180 tablet, Refills: 3    Associated Diagnoses: Type 2 diabetes mellitus with chronic kidney disease, without long-term current use of insulin, unspecified CKD stage (H)      prochlorperazine (COMPAZINE) 5 MG tablet Take 1-2 tablets (5-10 mg) by mouth every 6 hours as needed (Headache)  Qty: 10 tablet, Refills: 0      escitalopram (LEXAPRO) 20 MG tablet TAKE 1 TABLET BY MOUTH EVERY MORNING  Qty: 90 tablet, Refills: 1    Associated Diagnoses: Major depressive disorder, single episode, in partial remission (H)      Loteprednol Etabonate (LOTEMAX) 0.5 % OINT opthalmic ointment Place 1 Application (4 g) into both eyes At Bedtime  Qty: 1 Tube, Refills: 1    Comments: Okay to substitute with FML ointment or maxitrol ointment if needed  Associated Diagnoses: Dry eye; Keratitis sicca, bilateral      cycloSPORINE (RESTASIS) 0.05 % ophthalmic emulsion Place 1 drop into both eyes 2 times daily  Qty: 180 each, Refills: 3    Comments: 180 dropperettes = 3 month  supply  Associated Diagnoses: Keratitis sicca, bilateral      !! metFORMIN (GLUCOPHAGE-XR) 500 MG 24 hr tablet TAKE 2 TABLETS BY MOUTH AFTER BREAKFAST AND SUPPER  Qty: 360 tablet, Refills: 0    Associated Diagnoses: Type 2 diabetes mellitus with diabetic polyneuropathy, without long-term current use of insulin (H)      sitagliptin (JANUVIA) 50 MG tablet Take 1 tablet (50 mg) by mouth daily  Qty: 90 tablet, Refills: 3    Associated Diagnoses: Type 2 diabetes mellitus with chronic kidney disease, without long-term current use of insulin, unspecified CKD stage (H)      nitroFURantoin, macrocrystal-monohydrate, (MACROBID) 100 MG capsule Take 1 capsule (100 mg) by mouth 2 times daily  Qty: 14 capsule, Refills: 0    Associated Diagnoses: Dysuria      cyclobenzaprine (FLEXERIL) 10 MG tablet Take 1 tablet (10 mg) by mouth 3 times daily as needed for muscle spasms  Qty: 90 tablet, Refills: 1    Associated Diagnoses: Acute right-sided thoracic back pain      potassium chloride SA (K-DUR/KLOR-CON M) 10 MEQ CR tablet Take 2 tablets (20 mEq) by mouth 2 times daily  Qty: 120 tablet, Refills: 3    Associated Diagnoses: Hypokalemia      levothyroxine (SYNTHROID/LEVOTHROID) 150 MCG tablet Take 1 tablet (150 mcg) by mouth daily  Qty: 90 tablet, Refills: 0    Associated Diagnoses: Hypothyroidism, unspecified type      TIZANIDINE HCL PO Take 4 mg by mouth At Bedtime      Miconazole Nitrate 2 % POWD Apply to rash on chest three times daily.  Continue for one week after the rash has resolved.  Qty: 100 g, Refills: 3    Associated Diagnoses: Intertrigo      blood glucose monitoring (NO BRAND SPECIFIED) test strip 1 strip by In Vitro route 2 times daily Strips per patient's preference  Qty: 200 each, Refills: 3      niacin (NIASPAN) 500 MG CR tablet TAKE 1 TABLET(500 MG) BY MOUTH TWICE DAILY  Qty: 180 tablet, Refills: 1    Associated Diagnoses: Hyperlipidemia with target LDL less than 70      furosemide (LASIX) 40 MG tablet Take 80 mg by  mouth 2 times daily   Qty: 120 tablet, Refills: 6    Associated Diagnoses: Chronic diastolic heart failure (H)      !! metFORMIN (GLUCOPHAGE-XR) 500 MG 24 hr tablet Take 500 mg by mouth 2 times daily (with meals) Reported on 5/23/2017      atorvastatin (LIPITOR) 40 MG tablet TAKE 1 TABLET(40 MG) BY MOUTH DAILY  Qty: 90 tablet, Refills: 3    Associated Diagnoses: Hyperlipidemia with target LDL less than 70      pregabalin (LYRICA) 100 MG capsule Take 1 capsule (100 mg) by mouth 2 times daily  Qty: 180 capsule, Refills: 2    Associated Diagnoses: Myalgia      albuterol (ALBUTEROL) 108 (90 BASE) MCG/ACT Inhaler INHALE 1 TO 2 PUFFS EVERY 4 TO 6 HOURS AS NEEDED  Qty: 1 Inhaler, Refills: PRN    Associated Diagnoses: Chronic obstructive pulmonary disease with acute exacerbation (H)      febuxostat (ULORIC) 40 MG TABS tablet Take 1 tablet (40 mg) by mouth every evening  Qty: 90 tablet, Refills: 3    Associated Diagnoses: Hyperuricemia      traZODone (DESYREL) 50 MG tablet Take 3 tablets (150 mg) by mouth At Bedtime  Qty: 90 tablet, Refills: 7    Associated Diagnoses: Insomnia, unspecified      EPINEPHrine 0.3 MG/0.3ML injection Inject 0.3 mLs (0.3 mg) into the muscle once as needed for anaphylaxis  Qty: 0.6 mL, Refills: 0    Associated Diagnoses: Allergic reaction, subsequent encounter      LIFESCAN FINEPOINT LANCETS MISC Use to test blood sugars 2 times daily or as directed.  Qty: 100 each, Refills: prn    Associated Diagnoses: Type 2 diabetes, HbA1C goal < 8% (H)      nitroglycerin (NITROSTAT) 0.4 MG SL tablet Place 1 tablet (0.4 mg) under the tongue every 5 minutes as needed for chest pain As needed for chest pain.  Qty: 25 tablet, Refills: PRN    Associated Diagnoses: Coronary atherosclerosis of unspecified type of vessel, native or graft      senna-docusate (SENOKOT-S;PERICOLACE) 8.6-50 MG per tablet Take 1-2 tablets by mouth 2 times daily as needed for constipation  Qty: 60 tablet, Refills: 0    Associated Diagnoses:  Constipation      Folic Acid 800 MCG TABS Take 800 mcg by mouth daily.      Cholecalciferol (VITAMIN D3) 5000 UNIT/ML LIQD Take 10,000 Units by mouth daily       ONE TOUCH ULTRA (DEVICES) MISC test blood sugar BID  Qty: 1, Refills: 0    Associated Diagnoses: Type II or unspecified type diabetes mellitus without mention of complication, not stated as uncontrolled      GUAIFENESIN  MG OR TBCR Take 600 mg by mouth twice daily  Qty: 30, Refills: 0       !! - Potential duplicate medications found. Please discuss with provider.                Consultations:   No consultations were requested during this admission             Brief History of Illness:   Mariel Ribeiro is a 72 year old female with a history of morbid obesity, DM II, hypertension, CAD s/p stenting, and HFpEF, who presented to the ED with chest pain.           Hospital Course:   Patient with known CAD s/p stent to LAD. EKG in ED was normal. CXR was negative. Ddimer 0.5, so negative for her age group. BMP significant for mild hypokalemia at 3.0 and mildly elevated creatinine at 1.16. Troponin x 1 negative.  BNP 83. Echo in ED showed mild diffuse hypokinesis, no pericardial effusion, no wall motion or EF changes since prior study on 9/1/2016, however worsened diastolic dysfunction.   Last cardiac study was coronary angiography on 9/19/2016 for a pre-operative exam, which showed 3-vessel disease with one-vessel obstructive CAD to the RCA. She had patent stents in LAD and OM1. CSI was consulted in ED and recommended observation for serial troponins and lexiscan in AM.  Serial troponins negative. Lexiscan normal. Patient was discharged with recommendations to discuss with her PCP starting a PPI and also to follow up in sleep clinic.      2. Hypokalemia. Hypomagnesia  3.0 in ED. On chronic potassium and Lasix. Received 40mEq orally in ED. Magnesium was checked and was normal. Recheck of K was 3.0. Replaced for a total of 60 mEq. Recommend patient follow up  with her pcp at the end of this week or early next week for potassium recheck. Patient's magnesium also low. Patient received 400 mg of Magnesium Oxide. Recommended to follow up with PCP for recheck at the end of this week or early next week.        3. HFpEF. Bedside echo in ED showed EF preserved at 50-55%, no significant change from previous at on 9/1/2016, but worsened diastolic dysfunction.  BNP 83. No clinical signs of volume overload.  - continue Lasix 80mg BID per home regimen        Chronic Medical Issues:  # Hyperlipidemia.  - continue home niacin and atorvastatin     # BELEN. Admits to h/o BELEN, on home CPAP, but admits to PND and nighttime cough. Did not bring home CPAP here.  - recommend outpatient follow up with pulm/sleep medicine     # DMII. Last A1C 7.3 on 8/9/2017.   - ContinueJanuvia, glimirpide, and metformin at discharge.     # CKD stage III. Baseline creatinine 1.15-1.3. Today, 1.14       # COPD. Lungs clear today. Takes fluticasone inhaler at home.     # Hypothyroidism.  - continue levothyroxine per home regimen     # Chronic pain due to Fibromyalgia.  - continue Tizanidine daily, Flexeril prn, Lyrica     # Chronic constipation.   - continue senna-docusate BID     # Depression.  - continue escitalopram per home regimen      # Insomnia.  - continue trazodone     # Thrombocytopenia. Mild, platelets 144 in ED. History of thrombocytopenia with platelets in the 100-150 range.               Final Day of Progress before Discharge:       Physical Exam:  Blood pressure 169/81, pulse 82, temperature 97.6  F (36.4  C), temperature source Oral, resp. rate 18, weight 134.3 kg (296 lb 1.2 oz), SpO2 94 %, not currently breastfeeding.    EXAM:  General: alert, appears comfortable, NAD. Afebrile.  Skin: no diaphoresis.  Head: Normocephalic.  Respiratory: No distress. Equal inspiration to bilateral bases. Lungs CTAB- no crackles, wheezes, or rales.  Cardiovascular: RRR. No murmurs, clicks gallops or rub. No  peripheral pitting edema. DP pulses 2+ bilaterally.   Musculoskeletal: extremities normal- no gross deformities noted, gait normal and normal muscle tone   Neurologic: Follows commands.  Psychiatric: mentation appears normal and affect normal/bright                Data:  All laboratory data reviewed             Significant Results:   None  Results for orders placed or performed during the hospital encounter of 11/07/17   XR Chest 2 Views    Narrative    Exam: XR CHEST 2 VW, 11/7/2017 11:52 AM    Indication: chest pain;     Comparison: Radiograph 10/4/2017.    Findings:   PA and lateral views the chest. Cardiac silhouette pressure within  normal limits. No focal airspace opacity, pleural effusion, or  pneumothorax. Accessory azygous lobe. Surgical clips project over the  neck. Degenerative changes of the glenohumeral joints. Mild  degenerative changes of thoracic spine      Impression    Impression:  No focal airspace opacity    I have personally reviewed the examination and initial interpretation  and I agree with the findings.    RAFITA ENGLAND MD   NM Lexiscan stress test (nuc card)    Narrative    INDICATION:  Chest Pain.    PROTOCOL:    Rest and stress myocardial perfusion imaging was performed using   Tc-99m tetrafosmin intravenously. Pharmacological stress was performed  with 0.4 mg of regadenoson intravenously. The EKG showed normal sinus  rhythm at rest. No significant abnormalities were noted with infusion  of regadenoson.    FINDINGS:  1.  Overall quality of the study: Diagnostic.   2.  Left ventricular cavity is Normal on the rest and stress studies.  3.  SPECT images demonstrate uniform radiotracer uptake of the  myocardium on both stress and rest images.  4.   Left ventricular ejection fraction is 51%. Left ventricular  end-diastolic volume is 121 mL. End-systolic volume is 59 mL.      Impression    IMPRESSION:  1.  Normal  myocardial SPECT study .No ischemia identified. Low normal  LV function.    KP  MD WALDEMAR   CBC with platelets differential   Result Value Ref Range    WBC 8.0 4.0 - 11.0 10e9/L    RBC Count 4.86 3.8 - 5.2 10e12/L    Hemoglobin 13.3 11.7 - 15.7 g/dL    Hematocrit 40.9 35.0 - 47.0 %    MCV 84 78 - 100 fl    MCH 27.4 26.5 - 33.0 pg    MCHC 32.5 31.5 - 36.5 g/dL    RDW 13.7 10.0 - 15.0 %    Platelet Count 144 (L) 150 - 450 10e9/L    Diff Method Automated Method     % Neutrophils 66.6 %    % Lymphocytes 24.1 %    % Monocytes 5.8 %    % Eosinophils 2.9 %    % Basophils 0.3 %    % Immature Granulocytes 0.3 %    Nucleated RBCs 0 0 /100    Absolute Neutrophil 5.3 1.6 - 8.3 10e9/L    Absolute Lymphocytes 1.9 0.8 - 5.3 10e9/L    Absolute Monocytes 0.5 0.0 - 1.3 10e9/L    Absolute Eosinophils 0.2 0.0 - 0.7 10e9/L    Absolute Basophils 0.0 0.0 - 0.2 10e9/L    Abs Immature Granulocytes 0.0 0 - 0.4 10e9/L    Absolute Nucleated RBC 0.0    Basic metabolic panel   Result Value Ref Range    Sodium 139 133 - 144 mmol/L    Potassium 3.0 (L) 3.4 - 5.3 mmol/L    Chloride 101 94 - 109 mmol/L    Carbon Dioxide 30 20 - 32 mmol/L    Anion Gap 7 3 - 14 mmol/L    Glucose 180 (H) 70 - 99 mg/dL    Urea Nitrogen 16 7 - 30 mg/dL    Creatinine 1.16 (H) 0.52 - 1.04 mg/dL    GFR Estimate 46 (L) >60 mL/min/1.7m2    GFR Estimate If Black 56 (L) >60 mL/min/1.7m2    Calcium 8.9 8.5 - 10.1 mg/dL   Troponin I   Result Value Ref Range    Troponin I ES <0.015 0.000 - 0.045 ug/L   D dimer quantitative   Result Value Ref Range    D Dimer 0.5 0.0 - 0.50 ug/ml FEU   Troponin I   Result Value Ref Range    Troponin I ES <0.015 0.000 - 0.045 ug/L   Magnesium   Result Value Ref Range    Magnesium 1.6 1.6 - 2.3 mg/dL   Troponin I   Result Value Ref Range    Troponin I ES <0.015 0.000 - 0.045 ug/L   Potassium   Result Value Ref Range    Potassium 3.7 3.4 - 5.3 mmol/L   Nt probnp inpatient   Result Value Ref Range    N-Terminal Pro BNP Inpatient 83 0 - 900 pg/mL   Glucose by meter   Result Value Ref Range    Glucose 264 (H) 70 - 99 mg/dL    Glucose by meter   Result Value Ref Range    Glucose 147 (H) 70 - 99 mg/dL   Glucose by meter   Result Value Ref Range    Glucose 150 (H) 70 - 99 mg/dL   Basic metabolic panel   Result Value Ref Range    Sodium 143 133 - 144 mmol/L    Potassium 3.0 (L) 3.4 - 5.3 mmol/L    Chloride 105 94 - 109 mmol/L    Carbon Dioxide 30 20 - 32 mmol/L    Anion Gap 9 3 - 14 mmol/L    Glucose 166 (H) 70 - 99 mg/dL    Urea Nitrogen 17 7 - 30 mg/dL    Creatinine 1.14 (H) 0.52 - 1.04 mg/dL    GFR Estimate 47 (L) >60 mL/min/1.7m2    GFR Estimate If Black 57 (L) >60 mL/min/1.7m2    Calcium 8.9 8.5 - 10.1 mg/dL   Magnesium   Result Value Ref Range    Magnesium 1.5 (L) 1.6 - 2.3 mg/dL   Phosphorus   Result Value Ref Range    Phosphorus 3.0 2.5 - 4.5 mg/dL   Glucose by meter   Result Value Ref Range    Glucose 161 (H) 70 - 99 mg/dL   Glucose by meter   Result Value Ref Range    Glucose 136 (H) 70 - 99 mg/dL   EKG 12 lead   Result Value Ref Range    Interpretation ECG Click View Image link to view waveform and result    ECHO COMPLETE WITH OPTISON    Narrative    [id number]  [id number]  [id number]  [id number]^VINCENZO^QUIN^           [facility],[place]  Echocardiography Laboratory  [address] [place] [id number]     Name: ROSY PALMER  MRN: [id number]  : 1945  Study Date: 2017 04:12 PM  Age: 72 yrs  Gender: Female  Patient Location: [facility]  Reason For Study: Chest Pain  Ordering Physician: QUIN LOYA  Performed By: Starla Miller RDCS     BSA: 2.4 m2  Height: 65 in  Weight: 300 lb  BP: 125/59 mmHg  _____________________________________________________________________________  __        Procedure  Echocardiogram with two-dimensional, color and spectral Doppler performed.  Contrast Optison. Technically difficult study.Extremely poor acoustic windows.  Optison (NDC #3078-3803-08) given intravenously. Patient was given 5 ml  mixture of 3 ml Optison and 6 ml saline. 4 ml  wasted.  _____________________________________________________________________________  __        Interpretation Summary  Borderline (EF 50-55%) reduced left ventricular function is present.  Mild diffuse hypokinesis is present.  The right ventricle is normal size. Global right ventricular function is  normal.  No pericardial effusion is present.  The inferior vena cava was normal in size with preserved respiratory  variability. Estimated PASP 22 mmHg.  No change in wall motion or EF to the previous study dated 09/01/2016. The  diastolic dysfunction has worsened since the last study.  _____________________________________________________________________________  __        Left Ventricle  Mild left ventricular dilation is present. Relative wall thickness is  increased consistent with concentric remodeling. Borderline (EF 50-55%)  reduced left ventricular function is present. Grade I or early diastolic  dysfunction. No regional wall motion abnormalities are seen. Mild diffuse  hypokinesis is present.     Right Ventricle  The right ventricle is normal size. Global right ventricular function is  normal.     Atria  Both atria appear normal.        Mitral Valve  The mitral valve is normal.     Aortic Valve  Aortic valve is normal in structure and function. The aortic valve is  tricuspid. Trace aortic insufficiency is present.     Tricuspid Valve  Trace to mild tricuspid insufficiency is present. Right ventricular systolic  pressure is 19mmHg above the right atrial pressure.     Pulmonic Valve  The pulmonic valve is normal.     Vessels  The aorta root is normal. The inferior vena cava was normal in size with  preserved respiratory variability.     Pericardium  Prominent epicardial fat is noted. No pericardial effusion is present.        Compared to Previous Study  No change in wall motion or EF to the previous study dated 09/01/2016. The  diastolic dysfunction has worsened since the last study.     Attestation  I have  personally viewed the imaging and agree with the interpretation and  report as documented by the fellow, Stefan Back, and/or edited by me.  _____________________________________________________________________________  __     MMode/2D Measurements & Calculations  IVSd: 1.0 cm  LVIDd: 5.4 cm  LVIDs: 3.9 cm  LVPWd: 1.2 cm  FS: 26.7 %  EDV(Teich): 138.8 ml  ESV(Teich): 67.0 ml  LV mass(C)d: 237.6 grams  LV mass(C)dI: 101.1 grams/m2  asc Aorta Diam: 3.3 cm  LVOT diam: 2.0 cm  LVOT area: 3.3 cm2  LA Volume (BP): 58.2 ml     RA Area: 16.2 cm2  RWT: 0.45  TAPSE: 2.4 cm        Doppler Measurements & Calculations  MV E max jordon: 47.3 cm/sec  MV A max jordon: 75.0 cm/sec  MV E/A: 0.63  MV dec slope: 508.2 cm/sec2  MV dec time: 0.09 sec  TV max P.5 mmHg  TR max jordon: 215.3 cm/sec  TR max P.5 mmHg  E/E' av.9  Lateral E/e': 5.6  Medial E/e': 8.2        _____________________________________________________________________________  __        Report approved by: TED Loaiza 2017 05:03 PM        *Note: Due to a large number of results and/or encounters for the requested time period, some results have not been displayed. A complete set of results can be found in Results Review.      Recent Results (from the past 48 hour(s))   XR Chest 2 Views    Narrative    Exam: XR CHEST 2 VW, 2017 11:52 AM    Indication: chest pain;     Comparison: Radiograph 10/4/2017.    Findings:   PA and lateral views the chest. Cardiac silhouette pressure within  normal limits. No focal airspace opacity, pleural effusion, or  pneumothorax. Accessory azygous lobe. Surgical clips project over the  neck. Degenerative changes of the glenohumeral joints. Mild  degenerative changes of thoracic spine      Impression    Impression:  No focal airspace opacity    I have personally reviewed the examination and initial interpretation  and I agree with the findings.    RAFITA ENGLAND MD   NM Lexiscan stress test (nuc card)    Narrative     INDICATION:  Chest Pain.    PROTOCOL:    Rest and stress myocardial perfusion imaging was performed using   Tc-99m tetrafosmin intravenously. Pharmacological stress was performed  with 0.4 mg of regadenoson intravenously. The EKG showed normal sinus  rhythm at rest. No significant abnormalities were noted with infusion  of regadenoson.    FINDINGS:  1.  Overall quality of the study: Diagnostic.   2.  Left ventricular cavity is Normal on the rest and stress studies.  3.  SPECT images demonstrate uniform radiotracer uptake of the  myocardium on both stress and rest images.  4.   Left ventricular ejection fraction is 51%. Left ventricular  end-diastolic volume is 121 mL. End-systolic volume is 59 mL.      Impression    IMPRESSION:  1.  Normal  myocardial SPECT study .No ischemia identified. Low normal  LV function.    VERONICA MEDEIROS MD                Pending Results:   Unresulted Labs Ordered in the Past 30 Days of this Admission     No orders found for last 61 day(s).                  Discharge Instructions and Follow-Up:     Discharge Procedure Orders  Potassium   Standing Status: Future  Standing Exp. Date: 01/07/18     Phosphorus   Order Comments: Draw with Potassium and magnesium     Magnesium   Standing Status: Future  Standing Exp. Date: 01/07/18     SLEEP EVALUATION & MANAGEMENT REFERRAL - [facility] / [facility]  [phone] (Age 2 and up)   Standing Status: Future  Standing Exp. Date: 11/08/18     Reason for your hospital stay   Order Comments: Chest pain.     Adult [facility] Follow-up and recommended labs and tests   Order Comments: Follow up with your primary care provider in 1 week.  Recommend a sleep study at the sleep study center. Phone: [phone]    Appointments on [place] and/or [facility] (with [facility] or [facility] provider or service). Call [phone] if you haven't heard regarding these appointments within 7 days of discharge.     Activity   Order  Comments: No activity restrictions.   Order Specific Question Answer Comments   Is discharge order? Yes      When to contact your care team   Order Comments: Please return to the ED if your chest pain worsens, you have pain radiating to the left arm or jaw or if you are short of breath. Follow up with your primary care provider in 1 week.     Discharge Instructions   Order Comments: You were admitted for chest pain. Troponin, a cardiac marker that shows if there was an injury to the heart was negative. Your EKG and chest x-ray were negative. Your cardiac stress test was negative. Discuss anti reflux medications with your primary care provider.     Follow Up and recommended labs and tests   Order Comments: Potassium, Phosphorous, and Magnesium need to be rechecked when you see your primary care provider in 1 week.     Diet   Order Comments: Regular diet.   Order Specific Question Answer Comments   Is discharge order? Yes             Attestation:  Tricia Escalante.

## 2017-11-09 ENCOUNTER — TELEPHONE (OUTPATIENT)
Dept: FAMILY MEDICINE | Facility: CLINIC | Age: 72
End: 2017-11-09

## 2017-11-09 NOTE — TELEPHONE ENCOUNTER
"  Hospital/TCU/ED for chronic condition Discharge Protocol    \"Hi, my name is Elissatwan Magana, a registered nurse, and I am calling from [facility].  I am calling to follow up and see how things are going for you after your recent emergency visit/hospital/TCU stay.\"    Tell me how you are doing now that you are home?\" Tired, better than before      Discharge Instructions    \"Let's review your discharge instructions.  What is/are the follow-up recommendations?  Pt. Response: follow up     \"Has an appointment with your primary care provider been scheduled?\"   just scheduled    \"When you see the provider, I would recommend that you bring your medications with you.\"    Medications    \"Tell me what changed about your medicines when you discharged?\"    Changes to chronic meds?    0-1    \"What questions do you have about your medications?\"    None     New diagnoses of heart failure, COPD, diabetes, or MI?    No                  Medication reconciliation completed? Yes  Was MTM referral placed (*Make sure to put transitions as reason for referral)?   No    Call Summary    \"What questions or concerns do you have about your recent visit and your follow-up care?\"     Concerned about elevated blood sugars since getting home. Advice given: Please bring log of blood sugars to appointment to discuss.     \"If you have questions or things don't continue to improve, we encourage you contact us through the main clinic number (give number).  Even if the clinic is not open, triage nurses are available 24/7 to help you.     We would like you to know that our clinic has extended hours (provide information).  We also have urgent care (provide details on closest location and hours/contact info)\"      \"Thank you for your time and take care!\"         "

## 2017-11-09 NOTE — TELEPHONE ENCOUNTER
Please call for In Patient follow up  Chief Complaint: Chest Pain, Unspecified Type, Hypophosphatemia

## 2017-11-09 NOTE — PLAN OF CARE
Problem: Patient Care Overview  Goal: Discharge Needs Assessment  - Serial troponins complete: Yes  - Stress test complete: Yes  - Seen and cleared by consultant if applicable: Yes  - Adequate pain control on oral analgesia: Yes   - Vital signs normal or at patient baseline: Yes  - Safe disposition plan has been identified: Yes     Pt a/o x 4; VSS, pt denies dizziness, SOB, or N/V; Pt reports moderate headache, administered medications and offered food. Pt resting comfortably in bed. Taking oral replacements for potassium and magnesium.

## 2017-11-09 NOTE — PROGRESS NOTES
DC instructions given to pt, verbalized understanding.  All belongings with pt, IV DC'd and documented.     Pt left unit via wheelchair and transport, family is picking her up in the lobby.

## 2017-11-13 DIAGNOSIS — G47.00 INSOMNIA: ICD-10-CM

## 2017-11-15 ENCOUNTER — TELEPHONE (OUTPATIENT)
Dept: FAMILY MEDICINE | Facility: CLINIC | Age: 72
End: 2017-11-15

## 2017-11-15 ENCOUNTER — OFFICE VISIT (OUTPATIENT)
Dept: FAMILY MEDICINE | Facility: CLINIC | Age: 72
End: 2017-11-15
Payer: MEDICARE

## 2017-11-15 VITALS
HEART RATE: 83 BPM | BODY MASS INDEX: 47.38 KG/M2 | WEIGHT: 293 LBS | TEMPERATURE: 95.5 F | SYSTOLIC BLOOD PRESSURE: 179 MMHG | RESPIRATION RATE: 22 BRPM | DIASTOLIC BLOOD PRESSURE: 109 MMHG | OXYGEN SATURATION: 96 %

## 2017-11-15 DIAGNOSIS — R68.83 CHILLS: ICD-10-CM

## 2017-11-15 DIAGNOSIS — R82.90 NONSPECIFIC FINDING ON EXAMINATION OF URINE: ICD-10-CM

## 2017-11-15 DIAGNOSIS — E11.22 TYPE 2 DIABETES MELLITUS WITH CHRONIC KIDNEY DISEASE, WITHOUT LONG-TERM CURRENT USE OF INSULIN, UNSPECIFIED CKD STAGE (H): ICD-10-CM

## 2017-11-15 DIAGNOSIS — K76.0 FATTY LIVER DISEASE, NONALCOHOLIC: ICD-10-CM

## 2017-11-15 DIAGNOSIS — E89.0 POSTOPERATIVE HYPOTHYROIDISM: ICD-10-CM

## 2017-11-15 DIAGNOSIS — E87.6 HYPOKALEMIA: Primary | ICD-10-CM

## 2017-11-15 DIAGNOSIS — G47.00 INSOMNIA, UNSPECIFIED TYPE: ICD-10-CM

## 2017-11-15 PROBLEM — R16.0 HEPATOMEGALY: Status: ACTIVE | Noted: 2017-11-15

## 2017-11-15 LAB
ALBUMIN UR-MCNC: NEGATIVE MG/DL
APPEARANCE UR: CLEAR
BACTERIA #/AREA URNS HPF: ABNORMAL /HPF
BILIRUB UR QL STRIP: NEGATIVE
COLOR UR AUTO: YELLOW
GLUCOSE UR STRIP-MCNC: NEGATIVE MG/DL
HGB UR QL STRIP: ABNORMAL
KETONES UR STRIP-MCNC: NEGATIVE MG/DL
LEUKOCYTE ESTERASE UR QL STRIP: ABNORMAL
NITRATE UR QL: NEGATIVE
NON-SQ EPI CELLS #/AREA URNS LPF: ABNORMAL /LPF
PH UR STRIP: 6 PH (ref 5–7)
RBC #/AREA URNS AUTO: ABNORMAL /HPF
SOURCE: ABNORMAL
SP GR UR STRIP: 1.01 (ref 1–1.03)
UROBILINOGEN UR STRIP-ACNC: 0.2 EU/DL (ref 0.2–1)
WBC #/AREA URNS AUTO: ABNORMAL /HPF

## 2017-11-15 PROCEDURE — 80053 COMPREHEN METABOLIC PANEL: CPT | Performed by: FAMILY MEDICINE

## 2017-11-15 PROCEDURE — 84443 ASSAY THYROID STIM HORMONE: CPT | Performed by: FAMILY MEDICINE

## 2017-11-15 PROCEDURE — 36415 COLL VENOUS BLD VENIPUNCTURE: CPT | Performed by: FAMILY MEDICINE

## 2017-11-15 PROCEDURE — 99495 TRANSJ CARE MGMT MOD F2F 14D: CPT | Performed by: FAMILY MEDICINE

## 2017-11-15 PROCEDURE — 81001 URINALYSIS AUTO W/SCOPE: CPT | Performed by: FAMILY MEDICINE

## 2017-11-15 PROCEDURE — 84100 ASSAY OF PHOSPHORUS: CPT | Performed by: FAMILY MEDICINE

## 2017-11-15 PROCEDURE — 87086 URINE CULTURE/COLONY COUNT: CPT | Performed by: FAMILY MEDICINE

## 2017-11-15 PROCEDURE — 83735 ASSAY OF MAGNESIUM: CPT | Performed by: FAMILY MEDICINE

## 2017-11-15 RX ORDER — TRAZODONE HYDROCHLORIDE 50 MG/1
150 TABLET, FILM COATED ORAL AT BEDTIME
Qty: 90 TABLET | Refills: 7 | Status: ON HOLD | OUTPATIENT
Start: 2017-11-15 | End: 2018-07-17

## 2017-11-15 RX ORDER — TRAZODONE HYDROCHLORIDE 50 MG/1
TABLET, FILM COATED ORAL
Qty: 0.1 TABLET | Refills: 0 | OUTPATIENT
Start: 2017-11-15
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
## 2017-11-15 NOTE — MR AVS SNAPSHOT
After Visit Summary   11/15/2017    Mariel Ribeiro    MRN: [id number]           Patient Information     Date Of Birth          1945        Visit Information        Provider Department      11/15/2017 4:00 PM Rafaela William MD [facility]        Today's Diagnoses     Hypokalemia    -  1    Insomnia, unspecified type        Chills        Postoperative hypothyroidism        Nonspecific finding on examination of urine        Fatty liver disease, nonalcoholic        Type 2 diabetes mellitus with chronic kidney disease, without long-term current use of insulin, unspecified CKD stage (H)           Follow-ups after your visit        Your next 10 appointments already scheduled     Dec 07, 2017  1:40 PM CST   (Arrive by 1:25 PM)   RETURN GENERAL with Camille Espinal [facility] ([facility])    [address] [place] [id number] [phone]              Who to contact     If you have questions or need follow up information about today's clinic visit or your schedule please contact [facility] directly at [phone].  Normal or non-critical lab and imaging results will be communicated to you by [employer], letter or phone within 4 business days after the clinic has received the results. If you do not hear from us within 7 days, please contact the clinic through [employer] or phone. If you have a critical or abnormal lab result, we will notify you by phone as soon as possible.  Submit refill requests through "[employer] or call your pharmacy and they will forward the refill request to us. Please allow 3 business days for your refill to be completed.          Additional Information About Your Visit        MyChart Information     "[employer] gives you secure access to your electronic health record. If you see a primary care provider, you can also send messages to your care team and make appointments. If  you have questions, please call your primary care clinic.  If you do not have a primary care provider, please call [phone] and they will assist you.        Care EveryWhere ID     This is your Care EveryWhere ID. This could be used by other organizations to access your [facility] medical records  [id number]        Your Vitals Were     Pulse Temperature Respirations Pulse Oximetry BMI (Body Mass Index)       83 95.5  F (35.3  C) (Tympanic) 22 96% 47.38 kg/m2        Blood Pressure from Last 3 Encounters:   11/15/17 (!) 179/109   11/08/17 156/69   10/04/17 170/69    Weight from Last 3 Encounters:   11/15/17 298 lb (135.2 kg)   11/07/17 296 lb 1.2 oz (134.3 kg)   10/04/17 296 lb (134.3 kg)              We Performed the Following     *UA reflex to Microscopic and Culture ([facility] (except [place] and [place])     Comprehensive metabolic panel     Magnesium     Phosphorus     TSH with free T4 reflex     Urine Culture Aerobic Bacterial     Urine Microscopic          Today's Medication Changes          These changes are accurate as of: 11/15/17  5:52 PM.  If you have any questions, ask your nurse or doctor.               Stop taking these medicines if you haven't already. Please contact your care team if you have questions.     Loteprednol Etabonate 0.5 % Oint opthalmic ointment   Commonly known as:  LOTEMAX   Stopped by:  Rafaela William MD           nitroFURantoin (macrocrystal-monohydrate) 100 MG capsule   Commonly known as:  MACROBID   Stopped by:  Rafaela William MD                Where to get your medicines      These medications were sent to [id number] - [place], [place] - [address] AT [facility], [place] [address]    Hours:  24-hours Phone:  [phone]     traZODone 50 MG tablet                Primary Care Provider Office Phone # Fax #    Rafaela William [phone] [address]  [place] [address]        Equal Access to Services     [facility] MADELEINE : Hadii von santillan tikasha Soedgardali, waaxda luqadaha, qaybta kaalmada brandon, gopal bird. So [facility] [phone].    ATENCIÓN: Si habla español, tiene a gillespie disposición servicios gratuitos de asistencia lingüística. Hebert al [phone].    We comply with applicable federal civil rights laws and [place] laws. We do not discriminate on the basis of race, color, national origin, age, disability, sex, sexual orientation, or gender identity.            Thank you!     Thank you for choosing [facility]  for your care. Our goal is always to provide you with excellent care. Hearing back from our patients is one way we can continue to improve our services. Please take a few minutes to complete the written survey that you may receive in the mail after your visit with us. Thank you!             Your Updated Medication List - Protect others around you: Learn how to safely use, store and throw away your medicines at www.disposemymeds.org.          This list is accurate as of: 11/15/17  5:52 PM.  Always use your most recent med list.                   Brand Name Dispense Instructions for use Diagnosis    albuterol 108 (90 BASE) MCG/ACT Inhaler    PROAIR HFA    1 Inhaler    INHALE 1 TO 2 PUFFS EVERY 4 TO 6 HOURS AS NEEDED    Chronic obstructive pulmonary disease with acute exacerbation (H)       aspirin  MG EC tablet     30 tablet    Take 1 tablet by mouth daily.        atorvastatin 40 MG tablet    LIPITOR    90 tablet    TAKE 1 TABLET(40 MG) BY MOUTH DAILY    Hyperlipidemia with target LDL less than 70       blood glucose monitoring test strip    no brand specified    200 each    1 strip by In Vitro route 2 times daily Strips per patient's preference        cyclobenzaprine 10 MG tablet    FLEXERIL    90 tablet    Take 1 tablet (10 mg) by mouth 3 times daily as needed for muscle spasms    Acute  right-sided thoracic back pain       cycloSPORINE 0.05 % ophthalmic emulsion    RESTASIS    180 each    Place 1 drop into both eyes 2 times daily    Keratitis sicca, bilateral       EPINEPHrine 0.3 MG/0.3ML injection 2-pack    EPIPEN/ADRENACLICK/or ANY BX GENERIC EQUIV    0.6 mL    Inject 0.3 mLs (0.3 mg) into the muscle once as needed for anaphylaxis    Allergic reaction, subsequent encounter       escitalopram 20 MG tablet    LEXAPRO    90 tablet    TAKE 1 TABLET BY MOUTH EVERY MORNING    Major depressive disorder, single episode, in partial remission (H)       febuxostat 40 MG Tabs tablet    ULORIC    90 tablet    Take 1 tablet (40 mg) by mouth every evening    Hyperuricemia       folic acid 800 MCG Tabs      Take 800 mcg by mouth daily.        furosemide 40 MG tablet    LASIX    120 tablet    Take 80 mg by mouth 2 times daily    Chronic diastolic heart failure (H)       glimepiride 4 MG tablet    AMARYL    180 tablet    TAKE 1 TABLET BY MOUTH TWICE DAILY(WITH MEALS)    Type 2 diabetes mellitus with chronic kidney disease, without long-term current use of insulin, unspecified CKD stage (H)       GUAIFENESIN  MG Tbcr     30    Take 600 mg by mouth twice daily        levothyroxine 150 MCG tablet    SYNTHROID/LEVOTHROID    90 tablet    Take 1 tablet (150 mcg) by mouth daily    Hypothyroidism, unspecified type       Adim8CAN FINEPOINT LANCETS Misc     100 each    Use to test blood sugars 2 times daily or as directed.    Type 2 diabetes, HbA1C goal < 8% (H)       * metFORMIN 500 MG 24 hr tablet    GLUCOPHAGE-XR     Take 500 mg by mouth 2 times daily (with meals) Reported on 5/23/2017        * metFORMIN 500 MG 24 hr tablet    GLUCOPHAGE-XR    360 tablet    TAKE 2 TABLETS BY MOUTH AFTER BREAKFAST AND SUPPER    Type 2 diabetes mellitus with diabetic polyneuropathy, without long-term current use of insulin (H)       Miconazole Nitrate 2 % Powd     100 g    Apply to rash on chest three times daily.  Continue for one  week after the rash has resolved.    Intertrigo       niacin 500 MG CR tablet    NIASPAN    180 tablet    TAKE 1 TABLET(500 MG) BY MOUTH TWICE DAILY    Hyperlipidemia with target LDL less than 70       nitroGLYcerin 0.4 MG sublingual tablet    NITROSTAT    25 tablet    Place 1 tablet (0.4 mg) under the tongue every 5 minutes as needed for chest pain As needed for chest pain.    Coronary atherosclerosis of unspecified type of vessel, native or graft       ONE TOUCH ULTRA (DEVICES) Misc     1    test blood sugar BID    Type II or unspecified type diabetes mellitus without mention of complication, not stated as uncontrolled       potassium chloride SA 10 MEQ CR tablet    K-DUR/KLOR-CON M    120 tablet    Take 2 tablets (20 mEq) by mouth 2 times daily    Hypokalemia       pregabalin 100 MG capsule    LYRICA    180 capsule    Take 1 capsule (100 mg) by mouth 2 times daily    Myalgia       prochlorperazine 5 MG tablet    COMPAZINE    10 tablet    Take 1-2 tablets (5-10 mg) by mouth every 6 hours as needed (Headache)        senna-docusate 8.6-50 MG per tablet    SENOKOT-S;PERICOLACE    60 tablet    Take 1-2 tablets by mouth 2 times daily as needed for constipation    Constipation       sitagliptin 50 MG tablet    JANUVIA    90 tablet    Take 1 tablet (50 mg) by mouth daily    Type 2 diabetes mellitus with chronic kidney disease, without long-term current use of insulin, unspecified CKD stage (H)       TIZANIDINE HCL PO      Take 4 mg by mouth At Bedtime        traZODone 50 MG tablet    DESYREL    90 tablet    Take 3 tablets (150 mg) by mouth At Bedtime    Insomnia, unspecified type       vitamin D3 5000 UNIT/ML Liqd      Take 10,000 Units by mouth daily        * Notice:  This list has 2 medication(s) that are the same as other medications prescribed for you. Read the directions carefully, and ask your doctor or other care provider to review them with you.

## 2017-11-16 DIAGNOSIS — E03.9 HYPOTHYROIDISM, UNSPECIFIED TYPE: ICD-10-CM

## 2017-11-16 LAB
ALBUMIN SERPL-MCNC: 4.1 G/DL (ref 3.4–5)
ALP SERPL-CCNC: 121 U/L (ref 40–150)
ALT SERPL W P-5'-P-CCNC: 26 U/L (ref 0–50)
ANION GAP SERPL CALCULATED.3IONS-SCNC: 13 MMOL/L (ref 3–14)
AST SERPL W P-5'-P-CCNC: 22 U/L (ref 0–45)
BACTERIA SPEC CULT: NORMAL
BILIRUB SERPL-MCNC: 0.5 MG/DL (ref 0.2–1.3)
BUN SERPL-MCNC: 22 MG/DL (ref 7–30)
CALCIUM SERPL-MCNC: 10.1 MG/DL (ref 8.5–10.1)
CHLORIDE SERPL-SCNC: 102 MMOL/L (ref 94–109)
CO2 SERPL-SCNC: 24 MMOL/L (ref 20–32)
CREAT SERPL-MCNC: 1.26 MG/DL (ref 0.52–1.04)
GFR SERPL CREATININE-BSD FRML MDRD: 42 ML/MIN/1.7M2
GLUCOSE SERPL-MCNC: 110 MG/DL (ref 70–99)
MAGNESIUM SERPL-MCNC: 1.6 MG/DL (ref 1.6–2.3)
PHOSPHATE SERPL-MCNC: 3.6 MG/DL (ref 2.5–4.5)
POTASSIUM SERPL-SCNC: 3.7 MMOL/L (ref 3.4–5.3)
PROT SERPL-MCNC: 7.9 G/DL (ref 6.8–8.8)
SODIUM SERPL-SCNC: 139 MMOL/L (ref 133–144)
SPECIMEN SOURCE: NORMAL
TSH SERPL DL<=0.005 MIU/L-ACNC: 0.49 MU/L (ref 0.4–4)

## 2017-11-16 RX ORDER — LEVOTHYROXINE SODIUM 150 UG/1
150 TABLET ORAL DAILY
Qty: 90 TABLET | Refills: 3 | Status: SHIPPED | OUTPATIENT
Start: 2017-11-16 | End: 2017-11-16

## 2017-11-16 RX ORDER — LEVOTHYROXINE SODIUM 150 UG/1
150 TABLET ORAL DAILY
Qty: 90 TABLET | Refills: 3 | Status: ON HOLD | OUTPATIENT
Start: 2017-11-16 | End: 2018-07-22

## 2017-11-27 DIAGNOSIS — I50.32 CHRONIC DIASTOLIC HEART FAILURE (H): ICD-10-CM

## 2017-11-27 DIAGNOSIS — E87.6 HYPOKALEMIA: ICD-10-CM

## 2017-11-27 DIAGNOSIS — M79.10 MYALGIA: ICD-10-CM

## 2017-12-01 RX ORDER — POTASSIUM CHLORIDE 750 MG/1
TABLET, EXTENDED RELEASE ORAL
Qty: 120 TABLET | Refills: 3 | Status: SHIPPED | OUTPATIENT
Start: 2017-12-01 | End: 2018-05-29

## 2017-12-01 RX ORDER — PREGABALIN 100 MG
CAPSULE ORAL
Qty: 180 CAPSULE | Refills: 3 | Status: ON HOLD | OUTPATIENT
Start: 2017-12-01 | End: 2018-07-22

## 2017-12-01 NOTE — TELEPHONE ENCOUNTER
Medication Detail      Disp Refills Start End WINSOME   potassium chloride SA (K-DUR/KLOR-CON M) 10 MEQ CR tablet 120 tablet 3 8/23/2017  --   Sig: Take 2 tablets (20 mEq) by mouth 2 times daily     Medication Detail      Disp Refills Start End WINSOME   pregabalin (LYRICA) 100 MG capsule 180 capsule 2 3/10/2017  No   Sig: Take 1 capsule (100 mg) by mouth 2 times daily     LOV   11-15-17

## 2017-12-01 NOTE — TELEPHONE ENCOUNTER
Potassium   Date Value Ref Range Status   11/15/2017 3.7 3.4 - 5.3 mmol/L Final   ]    Potassium Refilled per [facility] refill policy.    To MD for Lyrica request.     Candace Tello RN

## 2017-12-06 DIAGNOSIS — I50.32 CHRONIC DIASTOLIC HEART FAILURE (H): ICD-10-CM

## 2017-12-11 RX ORDER — FUROSEMIDE 40 MG
TABLET ORAL
Qty: 120 TABLET | Refills: 0 | Status: SHIPPED | OUTPATIENT
Start: 2017-12-11 | End: 2018-01-09

## 2017-12-11 NOTE — TELEPHONE ENCOUNTER
BP Readings from Last 1 Encounters:   11/15/17 (!) 179/109     Rafaela William MD  Please refill pt b/p med d/t how elevated it is.  Your last office visit note says it is high in clinic but normal at home.   '  Thanks!     Opal To RN

## 2017-12-28 NOTE — PATIENT INSTRUCTIONS
Recommendations from today's MTM visit:                                                    MTM (medication therapy management) is a service provided by a clinical pharmacist designed to help you get the most of out of your medicines.   Today we reviewed what your medicines are for, how to know if they are working, that your medicines are safe and how to make your medicine regimen as easy as possible.     1. Constipation - you can keep using Senna-S every day, and you should start taking 1 capful of Miralax powder mixed with a glass of juice/water every day - want to have a bowel movement at least every 24-48 hours. If no bowel movement in 2 days, you can call me. If bowel movements become more regular, or if you start having more than one bowel movement per day, you can consider taking less miralax (such as 1/2 cap per day).    2. Diabetes - You can restart the Metformin 500 mg ER tablets - 1 tablet with/after breakfast, and 1 tablet with/after dinner. Try to remember to take the after-dinner Metformin.      3. Tizanidine is okay to keep taking as you've been doing before.       Next MTM visit: Wednesday, November 15th at 11:30 AM.    To schedule another MTM appointment, please call the clinic directly or you may call the MTM scheduling line at [phone] or toll-free at [phone].     My Clinical Pharmacist's contact information:                                                      It was a pleasure seeing you today!  Please feel free to contact me with any questions or concerns you have.      [facility].  Medication Therapy Management Provider  [phone]  Dex Smith, Pharm-D-4.    You may receive a survey about the MTM services you received.  I would appreciate your feedback to help me serve you better in the future. Please fill it out and return it when you can. Your comments will be anonymous.       Sarcoidosis Chest pain

## 2018-01-09 ENCOUNTER — TELEPHONE (OUTPATIENT)
Dept: GENERAL RADIOLOGY | Facility: CLINIC | Age: 73
End: 2018-01-09

## 2018-01-09 DIAGNOSIS — I50.32 CHRONIC DIASTOLIC HEART FAILURE (H): ICD-10-CM

## 2018-01-09 NOTE — TELEPHONE ENCOUNTER
"Requested Prescriptions   Pending Prescriptions Disp Refills     furosemide (LASIX) 40 MG tablet [Pharmacy Med Name: FUROSEMIDE 40MG TABLETS]  Last Written Prescription Date:  12/11/2017  Last Fill Quantity: 120 tablet,  # refills: 0   Last Office Visit with G, P or [facility] prescribing provider:  11/15/2017   Future Office Visit:      120 tablet 0     Sig: TAKE 2 TABLETS(80 MG) BY MOUTH TWICE DAILY    Diuretics (Including Combos) Protocol Failed    1/9/2018  3:45 AM       Failed - Blood pressure under 140/90    BP Readings from Last 3 Encounters:   11/15/17 (!) 179/109   11/08/17 156/69   10/04/17 170/69          Failed - Normal serum creatinine on file in past 12 months    Recent Labs   Lab Test  11/15/17   1718   CR  1.26*           Passed - Recent or future visit with authorizing provider's specialty    Patient had office visit in the last year or has a visit in the next 30 days with authorizing provider.  See \"Patient Info\" tab in inbasket, or \"Choose Columns\" in Meds & Orders section of the refill encounter.        Passed - Patient is age 18 or older       Passed - No active pregancy on record       Passed - Normal serum potassium on file in past 12 months    Recent Labs   Lab Test  11/15/17   1718   POTASSIUM  3.7           Passed - Normal serum sodium on file in past 12 months    Recent Labs   Lab Test  11/15/17   1718   NA  139           Passed - No positive pregnancy test in past 12 months          "

## 2018-01-12 RX ORDER — FUROSEMIDE 40 MG
TABLET ORAL
Qty: 120 TABLET | Refills: 0 | Status: SHIPPED | OUTPATIENT
Start: 2018-01-12 | End: 2018-02-09

## 2018-01-12 NOTE — TELEPHONE ENCOUNTER
Doesn't necessarily need appt with me, but could have MA or pharmacy bp check.  I know she checks at home and tends to run higher here.

## 2018-01-12 NOTE — TELEPHONE ENCOUNTER
Routing refill request to provider for review/approval because:  Labs out of range:  creatinine  -BP elevated      Dr. William-Please advise if you recommend patient schedule BP follow up visit.    Thank you!  SONIA RobertsN, RN

## 2018-01-12 NOTE — TELEPHONE ENCOUNTER
Triage relayed the need for BP check at pharmacy or clinic.  She does not drive so it may be some weeks before she gets in.    She does take her own BP at home and readings have been 135/70 at most times.  She states she has not had any spikes in BP.  Monica Gonzalez RN

## 2018-01-24 DIAGNOSIS — E87.6 HYPOKALEMIA: ICD-10-CM

## 2018-01-29 RX ORDER — POTASSIUM CHLORIDE 750 MG/1
TABLET, EXTENDED RELEASE ORAL
Qty: 120 TABLET | Refills: 0 | OUTPATIENT
Start: 2018-01-29

## 2018-02-09 ENCOUNTER — TELEPHONE (OUTPATIENT)
Dept: GENERAL RADIOLOGY | Facility: CLINIC | Age: 73
End: 2018-02-09

## 2018-02-09 DIAGNOSIS — I20.0 INTERMEDIATE CORONARY SYNDROME (H): Primary | ICD-10-CM

## 2018-02-09 DIAGNOSIS — I50.32 CHRONIC DIASTOLIC HEART FAILURE (H): ICD-10-CM

## 2018-02-09 DIAGNOSIS — Z98.890 STATUS POST CORONARY ANGIOGRAM: ICD-10-CM

## 2018-02-09 NOTE — TELEPHONE ENCOUNTER
"Requested Prescriptions   Pending Prescriptions Disp Refills     furosemide (LASIX) 40 MG tablet [Pharmacy Med Name: FUROSEMIDE 40MG TABLETS]  Last Written Prescription Date:  1/12/2018  Last Fill Quantity: 120 tabs,  # refills: 0   Last Office Visit with G, P or [facility] prescribing provider:  6/28/2017  Future Office Visit:      120 tablet 0     Sig: TAKE 2 TABLETS(80 MG) BY MOUTH TWICE DAILY    Diuretics (Including Combos) Protocol Failed    2/9/2018  3:46 AM       Failed - Blood pressure under 140/90    BP Readings from Last 3 Encounters:   11/15/17 (!) 179/109   11/08/17 156/69   10/04/17 170/69                Failed - Normal serum creatinine on file in past 12 months    Recent Labs   Lab Test  11/15/17   1718   CR  1.26*             Passed - Recent or future visit with authorizing provider's specialty    Patient had office visit in the last year or has a visit in the next 30 days with authorizing provider.  See \"Patient Info\" tab in inbasket, or \"Choose Columns\" in Meds & Orders section of the refill encounter.            Passed - Patient is age 18 or older       Passed - No active pregancy on record       Passed - Normal serum potassium on file in past 12 months    Recent Labs   Lab Test  11/15/17   1718   POTASSIUM  3.7                   Passed - Normal serum sodium on file in past 12 months    Recent Labs   Lab Test  11/15/17   1718   NA  139             Passed - No positive pregnancy test in past 12 months          "

## 2018-02-09 NOTE — TELEPHONE ENCOUNTER
"Requested Prescriptions   Pending Prescriptions Disp Refills     furosemide (LASIX) 40 MG tablet [Pharmacy Med Name: FUROSEMIDE 40MG TABLETS]  Last Written Prescription Date:  1-12-18  Last Fill Quantity: 120 tab,  # refills: 0   Last office visit: 6/28/2017 with prescribing provider:  11-15-17   Future Office Visit:     120 tablet 0     Sig: TAKE 2 TABLETS(80 MG) BY MOUTH TWICE DAILY    Diuretics (Including Combos) Protocol Failed    2/9/2018  3:46 AM       Failed - Blood pressure under 140/90    BP Readings from Last 3 Encounters:   11/15/17 (!) 179/109   11/08/17 156/69   10/04/17 170/69          Failed - Normal serum creatinine on file in past 12 months    Recent Labs   Lab Test  11/15/17   1718   CR  1.26*          Passed - Recent or future visit with authorizing provider's specialty    Patient had office visit in the last year or has a visit in the next 30 days with authorizing provider.  See \"Patient Info\" tab in inbasket, or \"Choose Columns\" in Meds & Orders section of the refill encounter.        Passed - Patient is age 18 or older       Passed - No active pregancy on record       Passed - Normal serum potassium on file in past 12 months    Recent Labs   Lab Test  11/15/17   1718   POTASSIUM  3.7           Passed - Normal serum sodium on file in past 12 months    Recent Labs   Lab Test  11/15/17   1718   NA  139          Passed - No positive pregnancy test in past 12 months          "

## 2018-02-13 RX ORDER — FUROSEMIDE 40 MG
TABLET ORAL
Qty: 120 TABLET | Refills: 3 | Status: SHIPPED | OUTPATIENT
Start: 2018-02-13 | End: 2018-06-12

## 2018-02-13 NOTE — TELEPHONE ENCOUNTER
Reviewed and refilled, thanks. This med and dose were prescribed by her cardiologist.  I think the higher dosing (80mg BID) was only meant to be short-term but she has stayed on it.  I didn't change it because it was doing ok for her, but I do think she should review it with her cardiologist.  My recommendation would be to schedule a follow up with them at her convenience.  Doesn't look like she's been there for over a year.  Her BP is often high here in clinic but at goal at home.  Thank you for reviewing.  Rafaela

## 2018-02-20 NOTE — TELEPHONE ENCOUNTER
Dr Anjelica Rojas will be seeing pts at [facility] starting April  Dr Eren Yousif at [facility] or Dr Sultana Feliz at [facility]    Pt notified and she will try Trini or Benja. She was given number to schedule at [place]    Candace Tello RN, BSN

## 2018-02-20 NOTE — TELEPHONE ENCOUNTER
Dr. Yousif or Dr. Feliz at the .  Or could try the new cardiologist coming to [place].    Thanks

## 2018-02-25 ENCOUNTER — PRE VISIT (OUTPATIENT)
Dept: CARDIOLOGY | Facility: CLINIC | Age: 73
End: 2018-02-25

## 2018-03-19 ENCOUNTER — OFFICE VISIT (OUTPATIENT)
Dept: FAMILY MEDICINE | Facility: CLINIC | Age: 73
End: 2018-03-19
Payer: MEDICARE

## 2018-03-19 VITALS
RESPIRATION RATE: 22 BRPM | TEMPERATURE: 96.6 F | SYSTOLIC BLOOD PRESSURE: 142 MMHG | DIASTOLIC BLOOD PRESSURE: 72 MMHG | OXYGEN SATURATION: 96 % | BODY MASS INDEX: 48.49 KG/M2 | WEIGHT: 293 LBS | HEART RATE: 82 BPM

## 2018-03-19 DIAGNOSIS — T78.40XD ALLERGIC REACTION, SUBSEQUENT ENCOUNTER: ICD-10-CM

## 2018-03-19 DIAGNOSIS — R49.0 HOARSENESS: ICD-10-CM

## 2018-03-19 DIAGNOSIS — E11.42 TYPE 2 DIABETES MELLITUS WITH DIABETIC POLYNEUROPATHY, WITHOUT LONG-TERM CURRENT USE OF INSULIN (H): ICD-10-CM

## 2018-03-19 DIAGNOSIS — G89.4 CHRONIC PAIN SYNDROME: Primary | ICD-10-CM

## 2018-03-19 DIAGNOSIS — M54.6 ACUTE RIGHT-SIDED THORACIC BACK PAIN: ICD-10-CM

## 2018-03-19 DIAGNOSIS — I50.30 (HFPEF) HEART FAILURE WITH PRESERVED EJECTION FRACTION (H): ICD-10-CM

## 2018-03-19 DIAGNOSIS — E03.9 HYPOTHYROIDISM, UNSPECIFIED TYPE: ICD-10-CM

## 2018-03-19 DIAGNOSIS — F33.1 MODERATE EPISODE OF RECURRENT MAJOR DEPRESSIVE DISORDER (H): ICD-10-CM

## 2018-03-19 DIAGNOSIS — B37.0 THRUSH: ICD-10-CM

## 2018-03-19 LAB — HBA1C MFR BLD: 8.2 % (ref 4.3–6)

## 2018-03-19 PROCEDURE — 82043 UR ALBUMIN QUANTITATIVE: CPT | Performed by: FAMILY MEDICINE

## 2018-03-19 PROCEDURE — 99207 C FOOT EXAM  NO CHARGE: CPT | Performed by: FAMILY MEDICINE

## 2018-03-19 PROCEDURE — 80061 LIPID PANEL: CPT | Performed by: FAMILY MEDICINE

## 2018-03-19 PROCEDURE — 80053 COMPREHEN METABOLIC PANEL: CPT | Performed by: FAMILY MEDICINE

## 2018-03-19 PROCEDURE — 84443 ASSAY THYROID STIM HORMONE: CPT | Performed by: FAMILY MEDICINE

## 2018-03-19 PROCEDURE — 36415 COLL VENOUS BLD VENIPUNCTURE: CPT | Performed by: FAMILY MEDICINE

## 2018-03-19 PROCEDURE — 83036 HEMOGLOBIN GLYCOSYLATED A1C: CPT | Performed by: FAMILY MEDICINE

## 2018-03-19 PROCEDURE — 99214 OFFICE O/P EST MOD 30 MIN: CPT | Performed by: FAMILY MEDICINE

## 2018-03-19 RX ORDER — NYSTATIN 100000/ML
500000 SUSPENSION, ORAL (FINAL DOSE FORM) ORAL 4 TIMES DAILY
Qty: 280 ML | Refills: 1 | Status: SHIPPED | OUTPATIENT
Start: 2018-03-19 | End: 2018-03-26

## 2018-03-19 RX ORDER — BLOOD-GLUCOSE METER
EACH MISCELLANEOUS
Qty: 100 EACH | Status: SHIPPED | OUTPATIENT
Start: 2018-03-19 | End: 2022-12-02

## 2018-03-19 RX ORDER — EPINEPHRINE 0.3 MG/.3ML
0.3 INJECTION SUBCUTANEOUS
Qty: 0.6 ML | Refills: 0 | Status: ON HOLD | OUTPATIENT
Start: 2018-03-19 | End: 2019-05-02

## 2018-03-19 RX ORDER — CYCLOBENZAPRINE HCL 10 MG
TABLET ORAL
Qty: 90 TABLET | Refills: 3 | Status: CANCELLED | OUTPATIENT
Start: 2018-03-19

## 2018-03-19 ASSESSMENT — ANXIETY QUESTIONNAIRES
GAD7 TOTAL SCORE: 6
3. WORRYING TOO MUCH ABOUT DIFFERENT THINGS: SEVERAL DAYS
1. FEELING NERVOUS, ANXIOUS, OR ON EDGE: NOT AT ALL
2. NOT BEING ABLE TO STOP OR CONTROL WORRYING: SEVERAL DAYS
5. BEING SO RESTLESS THAT IT IS HARD TO SIT STILL: SEVERAL DAYS
6. BECOMING EASILY ANNOYED OR IRRITABLE: SEVERAL DAYS
7. FEELING AFRAID AS IF SOMETHING AWFUL MIGHT HAPPEN: SEVERAL DAYS

## 2018-03-19 ASSESSMENT — PATIENT HEALTH QUESTIONNAIRE - PHQ9: 5. POOR APPETITE OR OVEREATING: SEVERAL DAYS

## 2018-03-19 NOTE — MR AVS SNAPSHOT
After Visit Summary   3/19/2018    Mariel Ribeiro    MRN: [id number]           Patient Information     Date Of Birth          1945        Visit Information        Provider Department      3/19/2018 1:00 PM Rafaela William MD [facility]        Today's Diagnoses     Chronic pain syndrome    -  1    Acute right-sided thoracic back pain        Thrush        Type 2 diabetes mellitus with diabetic polyneuropathy, without long-term current use of insulin (H)        Moderate episode of recurrent major depressive disorder (H)        Hoarseness        Hypothyroidism, unspecified type        (HFpEF) heart failure with preserved ejection fraction (H)        Allergic reaction, subsequent encounter           Follow-ups after your visit        Additional Services     PODIATRY/FOOT & ANKLE SURGERY REFERRAL       Your provider has referred you to: [facility]: [facility] [phone]   [email or web address]    Please be aware that coverage of these services is subject to the terms and limitations of your health insurance plan.  Call member services at your health plan with any benefit or coverage questions.      Please bring the following to your appointment:  >>   Any x-rays, CTs or MRIs which have been performed.  Contact the facility where they were done to arrange for  prior to your scheduled appointment.    >>   List of current medications   >>   This referral request   >>   Any documents/labs given to you for this referral                  Who to contact     If you have questions or need follow up information about today's clinic visit or your schedule please contact [facility] directly at [phone].  Normal or non-critical lab and imaging results will be communicated to you by MyChart, letter or phone within 4 business days after the clinic has received the results. If you do not hear from us within 7  days, please contact the clinic through [employer] or phone. If you have a critical or abnormal lab result, we will notify you by phone as soon as possible.  Submit refill requests through [employer] or call your pharmacy and they will forward the refill request to us. Please allow 3 business days for your refill to be completed.          Additional Information About Your Visit        [employer] Information     [employer] gives you secure access to your electronic health record. If you see a primary care provider, you can also send messages to your care team and make appointments. If you have questions, please call your primary care clinic.  If you do not have a primary care provider, please call [phone] and they will assist you.        Care EveryWhere ID     This is your Care EveryWhere ID. This could be used by other organizations to access your [facility] medical records  [id number]        Your Vitals Were     Pulse Temperature Respirations Pulse Oximetry BMI (Body Mass Index)       82 96.6  F (35.9  C) (Oral) 22 96% 48.49 kg/m2        Blood Pressure from Last 3 Encounters:   03/19/18 142/72   11/15/17 (!) 179/109   11/08/17 156/69    Weight from Last 3 Encounters:   03/19/18 (!) 305 lb (138.3 kg)   11/15/17 298 lb (135.2 kg)   11/07/17 296 lb 1.2 oz (134.3 kg)              We Performed the Following     Albumin Random Urine Quantitative with Creat Ratio     Comprehensive metabolic panel     DEPRESSION ACTION PLAN (DAP)     FOOT EXAM     Hemoglobin A1c     Lipid Profile     PODIATRY/FOOT & ANKLE SURGERY REFERRAL     TSH with free T4 reflex          Today's Medication Changes          These changes are accurate as of 3/19/18  8:30 PM.  If you have any questions, ask your nurse or doctor.               Start taking these medicines.        Dose/Directions    nystatin 500169 UNIT/ML suspension   Commonly known as:  MYCOSTATIN   Used for:  Thrush   Started by:  Rafaela William MD        Dose:  028923 Units   Take 5 mLs  (500,000 Units) by mouth 4 times daily for 7 days   Quantity:  280 mL   Refills:  1            Where to get your medicines      These medications were sent to [id number] - [place], [place] - [address] AT [facility] [address]  [address], [place] [address]    Hours:  24-hours Phone:  [phone]     blood glucose monitoring test strip    EPINEPHrine 0.3 MG/0.3ML injection 2-pack    LIFESCAN FINEPOINT LANCETS Misc    nystatin 334168 UNIT/ML suspension                Primary Care Provider Office Phone # Fax #    Rafaela William [phone] [address]  [place] [address]        Equal Access to Services     KJ SALVADOR AH: Hadii von santillan hadasho Soomaali, waaxda luqadaha, qaybta kaalmada adeegyada, waxay judahin haychris bird. So [facility] [phone].    ATENCIÓN: Si habla español, tiene a gillespie disposición servicios gratuitos de asistencia lingüística. [facility] [phone].    We comply with applicable federal civil rights laws and [place] laws. We do not discriminate on the basis of race, color, national origin, age, disability, sex, sexual orientation, or gender identity.            Thank you!     Thank you for choosing [facility]  for your care. Our goal is always to provide you with excellent care. Hearing back from our patients is one way we can continue to improve our services. Please take a few minutes to complete the written survey that you may receive in the mail after your visit with us. Thank you!             Your Updated Medication List - Protect others around you: Learn how to safely use, store and throw away your medicines at www.disposemymeds.org.          This list is accurate as of 3/19/18  8:30 PM.  Always use your most recent med list.                   Brand Name Dispense Instructions for use Diagnosis    albuterol 108 (90 BASE) MCG/ACT Inhaler    PROAIR HFA    1 Inhaler    INHALE 1 TO 2 PUFFS  EVERY 4 TO 6 HOURS AS NEEDED    Chronic obstructive pulmonary disease with acute exacerbation (H)       aspirin  MG EC tablet     30 tablet    Take 1 tablet by mouth daily.        atorvastatin 40 MG tablet    LIPITOR    90 tablet    TAKE 1 TABLET(40 MG) BY MOUTH DAILY    Hyperlipidemia with target LDL less than 70       blood glucose monitoring test strip    no brand specified    200 each    1 strip by In Vitro route 2 times daily Strips per patient's preference    Type 2 diabetes mellitus with diabetic polyneuropathy, without long-term current use of insulin (H)       cyclobenzaprine 10 MG tablet    FLEXERIL    90 tablet    TAKE 1 TABLET(10 MG) BY MOUTH THREE TIMES DAILY AS NEEDED FOR MUSCLE SPASMS    Acute right-sided thoracic back pain       cycloSPORINE 0.05 % ophthalmic emulsion    RESTASIS    180 each    Place 1 drop into both eyes 2 times daily    Keratitis sicca, bilateral       EPINEPHrine 0.3 MG/0.3ML injection 2-pack    EPIPEN/ADRENACLICK/or ANY BX GENERIC EQUIV    0.6 mL    Inject 0.3 mLs (0.3 mg) into the muscle once as needed for anaphylaxis    Allergic reaction, subsequent encounter       escitalopram 20 MG tablet    LEXAPRO    30 tablet    TAKE 1 TABLET BY MOUTH EVERY MORNING    Major depressive disorder, single episode, in partial remission (H)       febuxostat 40 MG Tabs tablet    ULORIC    90 tablet    Take 1 tablet (40 mg) by mouth every evening    Hyperuricemia       folic acid 800 MCG Tabs      Take 800 mcg by mouth daily.        furosemide 40 MG tablet    LASIX    120 tablet    TAKE 2 TABLETS(80 MG) BY MOUTH TWICE DAILY    Chronic diastolic heart failure (H)       glimepiride 4 MG tablet    AMARYL    180 tablet    TAKE 1 TABLET BY MOUTH TWICE DAILY(WITH MEALS)    Type 2 diabetes mellitus with chronic kidney disease, without long-term current use of insulin, unspecified CKD stage (H)       GUAIFENESIN  MG Tbcr     30    Take 600 mg by mouth twice daily        levothyroxine 150 MCG  tablet    SYNTHROID/LEVOTHROID    90 tablet    Take 1 tablet (150 mcg) by mouth daily    Hypothyroidism, unspecified type       LIFESCAN FINEPOINT LANCETS Misc     100 each    Use to test blood sugars 2 times daily or as directed.    Type 2 diabetes mellitus with diabetic polyneuropathy, without long-term current use of insulin (H)       LYRICA 100 MG capsule   Generic drug:  pregabalin     180 capsule    TAKE ONE CAPSULE BY MOUTH TWICE DAILY    Myalgia       * metFORMIN 500 MG 24 hr tablet    GLUCOPHAGE-XR     Take 500 mg by mouth 2 times daily (with meals) Reported on 5/23/2017        * metFORMIN 500 MG 24 hr tablet    GLUCOPHAGE-XR    360 tablet    TAKE 2 TABLETS BY MOUTH AFTER BREAKFAST AND SUPPER    Type 2 diabetes mellitus with diabetic polyneuropathy, without long-term current use of insulin (H)       Miconazole Nitrate 2 % Powd     100 g    Apply to rash on chest three times daily.  Continue for one week after the rash has resolved.    Intertrigo       niacin 500 MG CR tablet    NIASPAN    180 tablet    TAKE 1 TABLET(500 MG) BY MOUTH TWICE DAILY    Hyperlipidemia with target LDL less than 70       nitroGLYcerin 0.4 MG sublingual tablet    NITROSTAT    25 tablet    Place 1 tablet (0.4 mg) under the tongue every 5 minutes as needed for chest pain As needed for chest pain.    Coronary atherosclerosis of unspecified type of vessel, native or graft       nystatin 868146 UNIT/ML suspension    MYCOSTATIN    280 mL    Take 5 mLs (500,000 Units) by mouth 4 times daily for 7 days    Thrush       ONE TOUCH ULTRA (DEVICES) Misc     1    test blood sugar BID    Type II or unspecified type diabetes mellitus without mention of complication, not stated as uncontrolled       potassium chloride SA 10 MEQ CR tablet    K-DUR/KLOR-CON M    120 tablet    TAKE 2 TABLETS BY MOUTH TWICE DAILY    Hypokalemia, Chronic diastolic heart failure (H)       prochlorperazine 5 MG tablet    COMPAZINE    10 tablet    Take 1-2 tablets (5-10 mg)  by mouth every 6 hours as needed (Headache)        senna-docusate 8.6-50 MG per tablet    SENOKOT-S;PERICOLACE    60 tablet    Take 1-2 tablets by mouth 2 times daily as needed for constipation    Constipation       sitagliptin 50 MG tablet    JANUVIA    90 tablet    Take 1 tablet (50 mg) by mouth daily    Type 2 diabetes mellitus with chronic kidney disease, without long-term current use of insulin, unspecified CKD stage (H)       TIZANIDINE HCL PO      Take 4 mg by mouth At Bedtime        traZODone 50 MG tablet    DESYREL    90 tablet    Take 3 tablets (150 mg) by mouth At Bedtime    Insomnia, unspecified type       vitamin D3 5000 UNIT/ML Liqd      Take 10,000 Units by mouth daily        * Notice:  This list has 2 medication(s) that are the same as other medications prescribed for you. Read the directions carefully, and ask your doctor or other care provider to review them with you.

## 2018-03-19 NOTE — PROGRESS NOTES
"HPI      ROS      Physical Exam      SUBJECTIVE:   Mariel Ribeiro is a 72 year old female who presents to clinic today for the following health issues:    Depression and Anxiety Follow-Up    Status since last visit: Worsened     Other associated symptoms:None    Complicating factors:     Significant life event: Yes-  Pt had 3 deaths this year      Current substance abuse: None    PHQ-9 1/20/2017 5/19/2017 9/21/2017   Total Score 21 11 3   Q9: Suicide Ideation Several days Not at all Not at all     XU-7 SCORE 9/3/2013 7/7/2016 1/20/2017   Total Score 2 - -   Total Score - 3 10       PHQ-9  English  PHQ-9   Any Language  XU-7  Suicide Assessment Five-step Evaluation and Treatment (SAFE-T)    Amount of exercise or physical activity: 4-5 days/week for an average of 15-30 minutes    Problems taking medications regularly: No    Medication side effects: none    Diet: regular (no restrictions)    Additional:     1.  HL: the patient notes that her lipitor tablet appears to be excreted in her stool undigested.  Here for labs.     2. Chronic middle and low back pain: cannot stand for longer than 5 minutes.  Had previously weaned off of narcotics.  She does find the flexeril helpful, but it makes her too sleepy.      3.  Abdominal hernia: she says that \"this was fixed with my gallbladder but now it is bigger than ever,\" and it is somewhat achy and impedes upon her breathing.  She still has drainage from her umbilicus.  She plans to f/u with her surgeon but wanted to see me as well.  Denies severe abd pain, nausea, vomiting.      4.  Hoarseness: intermittent, along with severe dry mouth; her tongue gets stuck inside her mouth.  She has been using biotene, but it is not helping.  Does not have new dysphagia.      5.  Diabetic neuropathy: on lyrica, would also like to see a podiatrist about her nails.  States she cannot feel her feet.     6.  Depression: on lexapro 20mg, notes worsening depression lately, particularly with the " long winter and with worse back pain.       7.  HFpEF: she cancelled her appointment with cardiology because the message they left on her machine advised that she bring in her EKG; she didn't have an EKG to bring in, so she thought she had to come here and have one first.  I had asked her to f/u there because her cardiologist had increased her lasix to 80mg BID last year on a short term basis per the note, but then the patient remained on this dose since then.  Her edema has been stable.     8.  Chronic pain due to fibromyalgia and low back pain: the flexeril is working, but making her sleepy, as above.  She states she did have spinal injections in the past that did help, at the pain management clinic.       Problem list and histories reviewed & adjusted, as indicated.  Additional history: as documented    Patient Active Problem List   Diagnosis     Hypothyroidism      HX PHYSICAL ABUSE     Coronary atherosclerosis     Myalgia and myositis     Insomnia     Migraine     Chronic airway obstruction/ COPD     Mononeuritis     FAMILY HX-THROMBOSIS     Obstructive sleep apnea     Vitamin D deficiency     Hyperuricemia     Type 2 diabetes mellitus with diabetic chronic kidney disease (H)     H/O chronic kidney disease     Hypertension goal BP (blood pressure) < 130/80     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)     Hyperlipidemia with target LDL less than 70     Chronic diastolic heart failure (H)     Intermediate coronary syndrome (H)     Osteoarthritis     Morbid obesity (H)     Chest pain     Advanced directives, counseling/discussion     Arthritis of knee     Total knee replacement status     Constipation     Hypokalemia     Transient cerebral ischemia     Spells     Pain in joint of left shoulder     History of thyroid cancer     Status post coronary angiogram     Cervicalgia     Cholelithiasis     Pancreatitis, acute     Postoperative state     Fatty liver disease, nonalcoholic     Hepatomegaly     Past Surgical  History:   Procedure Laterality Date     ANGIOGRAPHY  8/11    with stent placement X2 mid- and proximal LAD     ARTHROPLASTY KNEE  1/28/2014    Procedure: ARTHROPLASTY KNEE;  ARTHROPLASTY KNEE -RIGHT TOTAL  ;  Surgeon: Victor Manuel Wolff MD;  Location: [facility]     C TOTAL KNEE ARTHROPLASTY  7/30/04    Knee Replacement, Total right and left     CATARACT IOL, RT/LT Bilateral      COLONOSCOPY       DILATION AND CURETTAGE, OPERATIVE HYSTEROSCOPY WITH MORCELLATOR, COMBINED N/A 11/1/2016    Procedure: COMBINED DILATION AND CURETTAGE, OPERATIVE HYSTEROSCOPY WITH MORCELLATOR;  Surgeon: Stacey Arnold DO;  Location: [facility] INTRODUCE NEEDLE/CATH, EXTREMITY ARTERY  1999,2002,2004    Angiocardiogram     HC KNEE SCOPE, DIAGNOSTIC  1990's    Arthroscopy, Knee, bilateral     LAPAROSCOPIC CHOLECYSTECTOMY N/A 8/11/2017    Procedure: LAPAROSCOPIC CHOLECYSTECTOMY;  Laparoscopic Cholecystectomy   *Latex Allergy*, Anesthesia Block;  Surgeon: Jeffrey Roberson MD;  Location: [facility] OR     PHACOEMULSIFICATION CLEAR CORNEA WITH STANDARD INTRAOCULAR LENS IMPLANT Right 12/29/2014    Procedure: PHACOEMULSIFICATION CLEAR CORNEA WITH STANDARD INTRAOCULAR LENS IMPLANT;  Surgeon: Smith Quintana MD;  Location: [facility]     PHACOEMULSIFICATION CLEAR CORNEA WITH TORIC INTRAOCULAR LENS IMPLANT Left 1/12/2015    Procedure: PHACOEMULSIFICATION CLEAR CORNEA WITH TORIC INTRAOCULAR LENS IMPLANT;  Surgeon: Smith Quintana MD;  Location: [facility]     SURGICAL HISTORY OF -   1963    dentures     SURGICAL HISTORY OF -   1985    thyroidectomy     SURGICAL HISTORY OF -   1998    right thumb surgery     SURGICAL HISTORY OF -   2001    right breast biopsy (benign)     SURGICAL HISTORY OF -   04/2004    left shoulder surgery - rotator cuff     SURGICAL HISTORY OF -   4/09    left thumb surgery     THYROIDECTOMY         Social History   Substance Use Topics     Smoking status: Former Smoker     Packs/day: 0.00     Years: 27.00     Types:  Cigarettes     Quit date: 7/1/1989     Smokeless tobacco: Never Used     Alcohol use No     Family History   Problem Relation Age of Onset     C.A.D. Mother      DIABETES Mother      Hypertension Mother      Blood Disease Mother      multiple episodes of thrombosis     Circulatory Mother      DVT X 2; ocular clot; cerebral; carotid artery stenosis     Glaucoma Mother      Macular Degeneration Mother      C.A.D. Father      Hypertension Father      CEREBROVASCULAR DISEASE Father      Alcohol/Drug Father      etoh     CANCER Brother      liver,pancreas, brain     Cardiovascular Sister      Hypertension Sister      Hypertension Brother      Alcohol/Drug Sister      etoh     Alcohol/Drug Brother      drug     DIABETES Sister      younger     C.A.D. Sister      CABG age 65     C.A.D. Brother      CABG age 42     C.A.D. Sister      stents age 58     C.A.D. Brother      Genitourinary Problems Sister      kidney disease         Current Outpatient Prescriptions   Medication Sig Dispense Refill     escitalopram (LEXAPRO) 20 MG tablet TAKE 1 TABLET BY MOUTH EVERY MORNING 30 tablet 0     furosemide (LASIX) 40 MG tablet TAKE 2 TABLETS(80 MG) BY MOUTH TWICE DAILY 120 tablet 3     cyclobenzaprine (FLEXERIL) 10 MG tablet TAKE 1 TABLET(10 MG) BY MOUTH THREE TIMES DAILY AS NEEDED FOR MUSCLE SPASMS 90 tablet 3     LYRICA 100 MG capsule TAKE ONE CAPSULE BY MOUTH TWICE DAILY 180 capsule 3     potassium chloride SA (K-DUR/KLOR-CON M) 10 MEQ CR tablet TAKE 2 TABLETS BY MOUTH TWICE DAILY 120 tablet 3     levothyroxine (SYNTHROID/LEVOTHROID) 150 MCG tablet Take 1 tablet (150 mcg) by mouth daily 90 tablet 3     traZODone (DESYREL) 50 MG tablet Take 3 tablets (150 mg) by mouth At Bedtime 90 tablet 7     glimepiride (AMARYL) 4 MG tablet TAKE 1 TABLET BY MOUTH TWICE DAILY(WITH MEALS) 180 tablet 3     prochlorperazine (COMPAZINE) 5 MG tablet Take 1-2 tablets (5-10 mg) by mouth every 6 hours as needed (Headache) 10 tablet 0     cycloSPORINE  (RESTASIS) 0.05 % ophthalmic emulsion Place 1 drop into both eyes 2 times daily 180 each 3     metFORMIN (GLUCOPHAGE-XR) 500 MG 24 hr tablet TAKE 2 TABLETS BY MOUTH AFTER BREAKFAST AND SUPPER 360 tablet 0     sitagliptin (JANUVIA) 50 MG tablet Take 1 tablet (50 mg) by mouth daily 90 tablet 3     TIZANIDINE HCL PO Take 4 mg by mouth At Bedtime       Miconazole Nitrate 2 % POWD Apply to rash on chest three times daily.  Continue for one week after the rash has resolved. 100 g 3     blood glucose monitoring (NO BRAND SPECIFIED) test strip 1 strip by In Vitro route 2 times daily Strips per patient's preference 200 each 3     niacin (NIASPAN) 500 MG CR tablet TAKE 1 TABLET(500 MG) BY MOUTH TWICE DAILY 180 tablet 1     metFORMIN (GLUCOPHAGE-XR) 500 MG 24 hr tablet Take 500 mg by mouth 2 times daily (with meals) Reported on 5/23/2017       atorvastatin (LIPITOR) 40 MG tablet TAKE 1 TABLET(40 MG) BY MOUTH DAILY 90 tablet 3     albuterol (ALBUTEROL) 108 (90 BASE) MCG/ACT Inhaler INHALE 1 TO 2 PUFFS EVERY 4 TO 6 HOURS AS NEEDED 1 Inhaler PRN     febuxostat (ULORIC) 40 MG TABS tablet Take 1 tablet (40 mg) by mouth every evening 90 tablet 3     EPINEPHrine 0.3 MG/0.3ML injection Inject 0.3 mLs (0.3 mg) into the muscle once as needed for anaphylaxis 0.6 mL 0     [employer] FINEPOINT LANCETS MISC Use to test blood sugars 2 times daily or as directed. 100 each prn     nitroglycerin (NITROSTAT) 0.4 MG SL tablet Place 1 tablet (0.4 mg) under the tongue every 5 minutes as needed for chest pain As needed for chest pain. 25 tablet PRN     senna-docusate (SENOKOT-S;PERICOLACE) 8.6-50 MG per tablet Take 1-2 tablets by mouth 2 times daily as needed for constipation 60 tablet 0     aspirin  MG tablet Take 1 tablet by mouth daily. 30 tablet 0     Folic Acid 800 MCG TABS Take 800 mcg by mouth daily.       Cholecalciferol (VITAMIN D3) 5000 UNIT/ML LIQD Take 10,000 Units by mouth daily        ONE TOUCH ULTRA (DEVICES) MISC test blood sugar  BID 1 0     GUAIFENESIN  MG OR TBCR Take 600 mg by mouth twice daily 30 0     Allergies   Allergen Reactions     Contrast Dye Anaphylaxis     Fish Allergy Anaphylaxis     Iodine Anaphylaxis     Oxycodone Other (See Comments)     Severe suicidal tendencies on this medication     Tree Nuts [Nuts] Anaphylaxis     Tree nuts only (peanuts ok)     Albuterol Other (See Comments)     Sandrita-oral erythema     Bactrim      Increased uric acid     Betadine [Povidone Iodine] Hives, Swelling and Difficulty breathing     Betadine     Combivent      Rash     Dulaglutide Other (See Comments)     Hx . Of thyroid cancer.     Lisinopril Other (See Comments)     Scr/grf severely reduced.      Penicillins Rash     Allopurinol Rash     Latex Rash     Wool Fiber Rash       Reviewed and updated as needed this visit by clinical staff  Tobacco  Allergies  Med Hx  Surg Hx  Fam Hx  Soc Hx      Reviewed and updated as needed this visit by Provider         ROS:  Constitutional, HEENT, cardiovascular, pulmonary, gi and gu systems are negative, except as otherwise noted.    OBJECTIVE:     /72 (BP Location: Right arm, Patient Position: Sitting, Cuff Size: Adult Regular)  Pulse 82  Temp 96.6  F (35.9  C) (Oral)  Resp 22  Wt (!) 305 lb (138.3 kg)  SpO2 96%  BMI 48.49 kg/m2  Body mass index is 48.49 kg/(m^2).  GENERAL: alert and no distress, obese and fatigued-appearing.  EYES: Eyes grossly normal to inspection, PERRL and conjunctivae and sclerae normal  HENT: white plaques to roof of mouth and posterior oropharynx; glossy reddish tongue.  NECK: no adenopathy, there is a mobile round smooth nontender mass to the right anterior neck as before.  RESP: lungs clear to auscultation - no rales, rhonchi or wheezes  CV: regular rate and rhythm, normal S1 S2, no S3 or S4, no murmur, click or rub, no peripheral edema and peripheral pulses strong  ABDOMEN: soft, obese, diastasis recti; a small amt of yellow crusting at the umbilicus.    MS:  trace BLE edema to mid-shin.  SKIN: very dry.  NEURO: Normal strength and tone, loses track of her sentences, but otherwise speech normal  PSYCH: forgets words, affect is flat, mood is depressed.   Diabetic foot exam: normal DP and PT pulses, no trophic changes or ulcerative lesions, reduced sensation throughout her feet bilaterally, and onychomycosis    PHQ: 15/27    ASSESSMENT/PLAN:   1.  HL: checking lipids; if not at goal, will discuss with MTM regarding perceived lack of absorption.    2. Chronic pain due to osteoarthritis in her spine and knees, mild cervical spinal stenosis, fibromyalgia as well as diabetic neuropathy. and low back pain: will try skelaxin.     3.  Abdominal hernia: she had repair of an umbilical hernia with her cholecystectomy this past year with Dr. Roberson.  The defect she is showing me today is more c/w diastasis recti, which I discussed usually isn't repaired, but if it is causing pain and difficulty breathing as she states, then I certainly recommend f/u consultation with her surgeon.     4.  Hoarseness: concerning in this patient with a h/o thyroid cancer, though she has had this on and off in the past 3 years since I have known her.  She has had an US of her neck this past year to evaluate a mass, which turned out to be a cyst.  This does not appear to be pressing on structures.  At this time she appears to have some thrush, so I discussed with her that we would treat this, but then if she continued to have symptoms, I would recommend consultation with ENT.    -nystatin swish and swallow    5.  Diabetic neuropathy: on lyrica, would also like to see a podiatrist about her nails. Referral is done.  Discussed that Dr. Tello could help with nail removal if that were indicated, but would recommend his other resources for basic nail trimming.     6.  Depression: we did not have much time to devote to this topic, which she brought up at the end.  There is an evident change in her mood since  last visit, which may have to do at least in part with her pain.  I will look into a less sedating muscle relaxant for her and will also review her pain management notes/imaging of her spine.  May need repeat imaging.  Would consider adding or switching to cymbalta if she has not already tried this.       7.  HFpEF, CAD: recommended she reschedule with cardiology, discussed no need to bring any records in, as they are in network; if they would like an updated EKG, they will obtain at the visit.  I will monitor lytes and renal fxn on lasix 80mg BID.  Her edema appears to be stable.           Rafaela William MD  [facility]

## 2018-03-19 NOTE — LETTER
My Depression Action Plan  Name: Mariel Ribeiro   Date of Birth 1945  Date: 3/19/2018    My doctor: Rafaela William   My clinic: [address] [id number] [phone]          GREEN    ZONE   Good Control    What it looks like:     Things are going generally well. You have normal up s and down s. You may even feel depressed from time to time, but bad moods usually last less than a day.   What you need to do:  1. Continue to care for yourself (see self care plan)  2. Check your depression survival kit and update it as needed  3. Follow your physician s recommendations including any medication.  4. Do not stop taking medication unless you consult with your physician first.           YELLOW         ZONE Getting Worse    What it looks like:     Depression is starting to interfere with your life.     It may be hard to get out of bed; you may be starting to isolate yourself from others.    Symptoms of depression are starting to last most all day and this has happened for several days.     You may have suicidal thoughts but they are not constant.   What you need to do:     1. Call your care team, your response to treatment will improve if you keep your care team informed of your progress. Yellow periods are signs an adjustment may need to be made.     2. Continue your self-care, even if you have to fake it!    3. Talk to someone in your support network    4. Open up your depression survival kit           RED    ZONE Medical Alert - Get Help    What it looks like:     Depression is seriously interfering with your life.     You may experience these or other symptoms: You can t get out of bed most days, can t work or engage in other necessary activities, you have trouble taking care of basic hygiene, or basic responsibilities, thoughts of suicide or death that will not go away, self-injurious behavior.     What you need to do:  1. Call your care team and  request a same-day appointment. If they are not available (weekends or after hours) call your local crisis line, emergency room or 911.            Depression Self Care Plan / Survival Kit    Self-Care for Depression  Here s the deal. Your body and mind are really not as separate as most people think.  What you do and think affects how you feel and how you feel influences what you do and think. This means if you do things that people who feel good do, it will help you feel better.  Sometimes this is all it takes.  There is also a place for medication and therapy depending on how severe your depression is, so be sure to consult with your medical provider and/ or Behavioral Health Consultant if your symptoms are worsening or not improving.     In order to better manage my stress, I will:    Exercise  Get some form of exercise, every day. This will help reduce pain and release endorphins, the  feel good  chemicals in your brain. This is almost as good as taking antidepressants!  This is not the same as joining a gym and then never going! (they count on that by the way ) It can be as simple as just going for a walk or doing some gardening, anything that will get you moving.      Hygiene   Maintain good hygiene (Get out of bed in the morning, Make your bed, Brush your teeth, Take a shower, and Get dressed like you were going to work, even if you are unemployed).  If your clothes don't fit try to get ones that do.    Diet  I will strive to eat foods that are good for me, drink plenty of water, and avoid excessive sugar, caffeine, alcohol, and other mood-altering substances.  Some foods that are helpful in depression are: complex carbohydrates, B vitamins, flaxseed, fish or fish oil, fresh fruits and vegetables.    Psychotherapy  I agree to participate in Individual Therapy (if recommended).    Medication  If prescribed medications, I agree to take them.  Missing doses can result in serious side effects.  I understand that  drinking alcohol, or other illicit drug use, may cause potential side effects.  I will not stop my medication abruptly without first discussing it with my provider.    Staying Connected With Others  I will stay in touch with my friends, family members, and my primary care provider/team.    Use your imagination  Be creative.  We all have a creative side; it doesn t matter if it s oil painting, sand castles, or mud pies! This will also kick up the endorphins.    Witness Beauty  (AKA stop and smell the roses) Take a look outside, even in mid-winter. Notice colors, textures. Watch the squirrels and birds.     Service to others  Be of service to others.  There is always someone else in need.  By helping others we can  get out of ourselves  and remember the really important things.  This also provides opportunities for practicing all the other parts of the program.    Humor  Laugh and be silly!  Adjust your TV habits for less news and crime-drama and more comedy.    Control your stress  Try breathing deep, massage therapy, biofeedback, and meditation. Find time to relax each day.     My support system    Clinic Contact:  Phone number:    Contact 1:  Phone number:    Contact 2:  Phone number:    Shinto/:  Phone number:    Therapist:  Phone number:    Local crisis center:    Phone number:    Other community support:  Phone number:

## 2018-03-20 LAB
ALBUMIN SERPL-MCNC: 3.6 G/DL (ref 3.4–5)
ALP SERPL-CCNC: 106 U/L (ref 40–150)
ALT SERPL W P-5'-P-CCNC: 24 U/L (ref 0–50)
ANION GAP SERPL CALCULATED.3IONS-SCNC: 7 MMOL/L (ref 3–14)
AST SERPL W P-5'-P-CCNC: 27 U/L (ref 0–45)
BILIRUB SERPL-MCNC: 0.5 MG/DL (ref 0.2–1.3)
BUN SERPL-MCNC: 17 MG/DL (ref 7–30)
CALCIUM SERPL-MCNC: 9.4 MG/DL (ref 8.5–10.1)
CHLORIDE SERPL-SCNC: 105 MMOL/L (ref 94–109)
CHOLEST SERPL-MCNC: 203 MG/DL
CO2 SERPL-SCNC: 29 MMOL/L (ref 20–32)
CREAT SERPL-MCNC: 1.28 MG/DL (ref 0.52–1.04)
CREAT UR-MCNC: 48 MG/DL
GFR SERPL CREATININE-BSD FRML MDRD: 41 ML/MIN/1.7M2
GLUCOSE SERPL-MCNC: 171 MG/DL (ref 70–99)
HDLC SERPL-MCNC: 50 MG/DL
LDLC SERPL CALC-MCNC: 111 MG/DL
MICROALBUMIN UR-MCNC: 13 MG/L
MICROALBUMIN/CREAT UR: 26.39 MG/G CR (ref 0–25)
NONHDLC SERPL-MCNC: 153 MG/DL
POTASSIUM SERPL-SCNC: 3.6 MMOL/L (ref 3.4–5.3)
PROT SERPL-MCNC: 7.1 G/DL (ref 6.8–8.8)
SODIUM SERPL-SCNC: 141 MMOL/L (ref 133–144)
TRIGL SERPL-MCNC: 212 MG/DL
TSH SERPL DL<=0.005 MIU/L-ACNC: 1.15 MU/L (ref 0.4–4)

## 2018-03-20 ASSESSMENT — ANXIETY QUESTIONNAIRES: GAD7 TOTAL SCORE: 6

## 2018-03-20 ASSESSMENT — PATIENT HEALTH QUESTIONNAIRE - PHQ9: SUM OF ALL RESPONSES TO PHQ QUESTIONS 1-9: 15

## 2018-03-23 RX ORDER — METAXALONE 800 MG/1
800 TABLET ORAL 3 TIMES DAILY PRN
Qty: 30 TABLET | Refills: 1 | Status: SHIPPED | OUTPATIENT
Start: 2018-03-23 | End: 2018-04-02

## 2018-03-26 ENCOUNTER — OFFICE VISIT (OUTPATIENT)
Dept: PODIATRY | Facility: CLINIC | Age: 73
End: 2018-03-26
Payer: MEDICARE

## 2018-03-26 VITALS
HEART RATE: 76 BPM | BODY MASS INDEX: 45.99 KG/M2 | SYSTOLIC BLOOD PRESSURE: 162 MMHG | OXYGEN SATURATION: 94 % | HEIGHT: 67 IN | WEIGHT: 293 LBS | TEMPERATURE: 97.8 F | DIASTOLIC BLOOD PRESSURE: 74 MMHG

## 2018-03-26 DIAGNOSIS — B35.1 ONYCHOMYCOSIS: Primary | ICD-10-CM

## 2018-03-26 DIAGNOSIS — E11.42 TYPE 2 DIABETES MELLITUS WITH DIABETIC POLYNEUROPATHY, WITHOUT LONG-TERM CURRENT USE OF INSULIN (H): ICD-10-CM

## 2018-03-26 PROCEDURE — 99214 OFFICE O/P EST MOD 30 MIN: CPT | Performed by: PODIATRIST
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
## 2018-03-26 NOTE — MR AVS SNAPSHOT
"              After Visit Summary   3/26/2018    Mariel Ribeiro    MRN: [id number]           Patient Information     Date Of Birth          1945        Visit Information        Provider Department      3/26/2018 2:15 PM Zeeshan Tello, DAO [facility]        Today's Diagnoses     Onychomycosis    -  1    Type 2 diabetes mellitus with diabetic polyneuropathy, without long-term current use of insulin (H)          Care Instructions    FOOT CARE NURSES  If you are interested in having a foot care nurse come out to your   home, please call one of these contacts for more information:  [facility]  [phone] [facility]  [phone]   [facility]  [phone]  [facility] [phone]  [place]   [facility]  [phone]  At [facility] [phone]                 DIABETES AND YOUR FEET    What effect does diabetes have on the feet?   Diabetes can result in several problems in the feet including ulcers (open sores) and amputations.  Two of the most important reasons why people develop foot problems when they have diabetes is :  1.  Neuropathy (loss of feeling) and 2.  Vascular disease (loss or decrease of blood flow).    What is neuropathy?  Neuropathy is a term used to describe a loss of nerve function.  Patients with diabetes are at risk of developing neuropathy if their sugars continue to run high and are above the normal value.  One theory for neuropathy is that the \"extra\" sugar in the body enters the nerves and is broken down.  These by-products build up in the nerve causing it to swell and impair nerve function.  Often times, this can be prevented by controlling your sugars, dieting and exercise.    How do I know if I have neuropathy?  When a person develops neuropathy, they usually begin to feel numbness or tingling in their feet and sometime in their legs.  Other symptoms may include painful " burning or hot feet, tingling or feeling like insects or ants are crawling on your feet or legs.  If the diabetes is sever and the sugars run high for long periods of time, neuropathy can also occur in the hands.    What is vascular disease?  Vascular disease is a term used to describe a loss or decrease in circulation (blood flow).  There is a problem in getting blood and oxygen to areas that need it.  Similar to neuropathy, sugars can build up in the walls of the arteries (blood vessels) and cause them to become swollen, thickened and hardened.  This decreases the amount of blood that can go to an area that needs it.  Though this is common in the legs of diabetic patients, it can also affect other arteries (blood vessels) in the body such as in the heart and eyes.    How do I know if I have vascular disease?  In the legs, vascular disease usually results in cramping.  Patients who develop leg cramps after walking the same distance every time (i.e. One block, half a mile, etc.) need to let their doctors know so that their circulation may be checked.  Cramps causing severe pain in the feet and/or legs while sleeping or cramps that go away when you stand may also be a sign of poor blood circulation.  Occasional cramping in cold weather or on rare occasions with activity may not be due to poor circulation, but you should inform your doctor.    How can these problems be prevented?  The key to prevention is good blood sugar control.  Poor blood sugar control is a big reason many of these problems start.  Physical activity (exercise) is a very good way to help decrease your blood sugars.  Exercise can lower your blood sugar, blood pressure, and cholesterol.  It also reduces your risk for heart disease and stroke, relieves stress, and strengthens your heart, muscles and bones.  In addition, regular activity helps insulin work better, improves your blood circulation, and keeps your joints flexible.  If you're trying to  lose weight, a combination of exercise and wise food choices can help you reach your target weight and maintain it.      Diabetic Foot Care Recommendations    The following are recommendations for avoiding serious foot problems or injury    DO'S    1.Wash your feet with lukewarm water and a mild soap and then dry them thoroughly, especially between the toes.      2. Examine your feet daily looking for cuts, corns, blisters, cracks, etc..., especially after wearing new shoes.  Make sure to look between your toes.  If you cannot see the bottom of your feet, set a mirror on the floor and hold your foot over it, or ask a spouse, friend or family member to examine your feet for you.  Contact your doctor immediately if new problems are noted or if sores are not healing.      3.  Immediately apply moisturizer to the tops and bottoms of your feet, avoiding areas between the toes.  Hand lotion (Intensive Care, Laura, Eucerin, Neutrogena, Curel, etc...) is sufficient unless your doctor prescribes a medicated lotion.  Apply sunscreen to your feet when going swimming outside.      4.Use clean comfortable shoes, wear white socks (if you have any bleeding or drainage, you will see it on white socks).  Socks should not have thick seams or cut off the circulation around the leg.  Break in new shoes slowly and rotate with older shoes until broken in.  Check the inside of your shoes with your hand to look for areas of irritation or objects that may have fallen into your shoes.        5. Keep slippers by the side of your bed for use during the night.      6. Shoes should be fitted by a professional and should not cause areas of irritation.  Check your feet regularly when wearing a new pair of shoes and replace them as needed.      7.  Talk to your doctor about proper exercise.  Exercise and stretching stimulate blood flow to your feet and maintain proper glucose levels.      8.  Monitor your blood glucose level as instructed by your  doctor.  Notify your doctor immediately if your blood sugar is abnormally high or low.      9.  Cut your nails straight across, but then gently round any sharp edges with a cardboard nail file.  If you have neuropathy, peripheral vascular disease or cannot see that well to trim your own toenails, see a medical professional for care.    DON'Ts    1.  Do not soak your feet if you have an open sore.  Use only lukewarm water and always check the temperature with your hand as hot water can easily burn your feet.        2.  Never use a hot water bottle or heating pad on your feet.  Also do not apply cold compresses to your feet.  With decreased sensation, you could burn or freeze your feet.        3.  Do not apply any of these to your feet:     - over the counter medicine for corns or warts     -  Harsh chemicals like boric acid     -  Do not self-treat corns, cuts, blisters or infections.  Always consult your doctor.        4.  Do not wear sandals, slippers or walk barefoot, especially on hot sand or concrete or other harsh surfaces.      5.  If you smoke, stop!!!              NAIL FUNGUS / ONYCHOMYCOSIS   Nail fungus is not a hygiene problem and will not likely lead to significant medical   problems. The nails may get thick causing pain and possibly local skin infection.   Treatments include debridement (trimming), oral antifungals, topical antifungals and complete removal of the nail. Most fungal nails are not treated.   Topicals such as tea tree oil can be helpful for surface fungus and may, at best, limit   progression. Over the counter creams (such as Lamisil) can also be used however, their effectiveness is also quite low.  Topical treatment with Pen lac is expensive and often not covered by insurance. Pen lac has an approximate 8% success rate. Topical therapy recommendations is to apply twice a day for at least 3-4 months as it takes 9 months for new nail to grow out.    Experts suggest soaking your feet for 15  to 20 minutes in a mixture of 1 cup vinegar to 4 cups warm water. Be sure to rinse well and pat your feet dry when you're done. You can soak your feet like this daily. But if your skin becomes irritated, try soaking only two to three times a week. Vicks VapoRub, as with vinegar, there have been no controlled clinical trials to assess the effectiveness of Vicks VapoRub on nail fungus, but there have been numerous anecdotal reports that it works. There's no consensus on how often to apply this product, so check with your doctor before using it on your nails.     Oral therapies include Sporanox and Lamisil. Oral therapies are also expensive and not very effective. Side effects such as liver disease are the main concern. Return of fungus is common even if the treatment worked.       Body Mass Index (BMI)  Many things can cause foot and ankle problems. Foot structure, activity level, foot mechanics and injuries are common causes of pain.  One very important issue that often goes unmentioned is body weight. Extra weight can cause increased stress on muscles, ligaments, bones and tendons. Sometimes just a few extra pounds is all it takes to put one over her/his threshold. Without reducing that stress, it can be difficult to alleviate pain.   Some people are uncomfortable addressing this issue, but we feel it is important for you to think about it. As Foot & Ankle specialists, our job is addressing the lower extremity problem and possible causes.   Regarding extra body weight, we encourage patients to discuss diet and weight management plans with their primary care doctors. It is this team approach that gives you the best opportunity for pain relief and getting you back on your feet.             Follow-ups after your visit        Additional Services     ORTHOTICS REFERRAL       Please be aware that coverage of these services is subject to the terms and limitations of your health insurance plan.  Call member services at your  health plan with any benefit or coverage questions.      Please bring the following to your appointment:    >>   Any x-rays, CTs or MRIs which have been performed.  Contact the facility where they were done to arrange for  prior to your scheduled appointment.  Any new CT, MRI or other procedures ordered by your specialist must be performed at a [place] facility or coordinated by your clinic's referral office.    >>   List of current medications   >>   This referral request   >>   Any documents/labs given to you for this referral    ==This Referral PRINTS in the [place] [facility] (ORTHOTICS & PROSTHETICS) Central scheduling office ==     The [facility] [place] [facility] Central Scheduling staff will contact patient to arrange appointments. Central Scheduling Phone #:  [place], [place]  [phone]     Orthotics: Extra depth diabetic shoes and accommodative inserts                  Follow-up notes from your care team     Return if symptoms worsen or fail to improve.      Who to contact     If you have questions or need follow up information about today's clinic visit or your schedule please contact [facility] directly at [phone].  Normal or non-critical lab and imaging results will be communicated to you by MyChart, letter or phone within 4 business days after the clinic has received the results. If you do not hear from us within 7 days, please contact the clinic through [facility] or phone. If you have a critical or abnormal lab result, we will notify you by phone as soon as possible.  Submit refill requests through [facility] or call your pharmacy and they will forward the refill request to us. Please allow 3 business days for your refill to be completed.          Additional Information About Your Visit        [facility] Information     [facility] gives you secure access to your electronic health record. If you see a primary care provider, you can also send messages to your care team and make  "appointments. If you have questions, please call your primary care clinic.  If you do not have a primary care provider, please call [phone] and they will assist you.        Care EveryWhere ID     This is your Care EveryWhere ID. This could be used by other organizations to access your [place] medical records  [id number]        Your Vitals Were     Pulse Temperature Height Pulse Oximetry BMI (Body Mass Index)       76 97.8  F (36.6  C) (Oral) 5' 6.5\" (1.689 m) 94% 48.49 kg/m2        Blood Pressure from Last 3 Encounters:   03/26/18 162/74   03/19/18 142/72   11/15/17 (!) 179/109    Weight from Last 3 Encounters:   03/26/18 305 lb (138.3 kg)   03/19/18 (!) 305 lb (138.3 kg)   11/15/17 298 lb (135.2 kg)              We Performed the Following     ORTHOTICS REFERRAL        Primary Care Provider Office Phone # Fax #    Rafaela William [phone]       [address] [place] [address]        Equal Access to Services     [facility]: Hadii aad ku hadasho Soomaali, waaxda luqadaha, qaybta kaalmada adegladysyada, gopal pena . So [facility] [phone].    ATENCIÓN: Si habla español, tiene a gillespie disposición servicios gratuitos de asistencia lingüística. [facility] [phone].    We comply with applicable federal civil rights laws and [place] laws. We do not discriminate on the basis of race, color, national origin, age, disability, sex, sexual orientation, or gender identity.            Thank you!     Thank you for choosing [facility]  for your care. Our goal is always to provide you with excellent care. Hearing back from our patients is one way we can continue to improve our services. Please take a few minutes to complete the written survey that you may receive in the mail after your visit with us. Thank you!             Your Updated Medication List - Protect others around you: Learn how to safely use, store and throw away your medicines at " www.disposemymeds.org.          This list is accurate as of 3/26/18  2:46 PM.  Always use your most recent med list.                   Brand Name Dispense Instructions for use Diagnosis    albuterol 108 (90 BASE) MCG/ACT Inhaler    PROAIR HFA    1 Inhaler    INHALE 1 TO 2 PUFFS EVERY 4 TO 6 HOURS AS NEEDED    Chronic obstructive pulmonary disease with acute exacerbation (H)       aspirin  MG EC tablet     30 tablet    Take 1 tablet by mouth daily.        atorvastatin 40 MG tablet    LIPITOR    90 tablet    TAKE 1 TABLET(40 MG) BY MOUTH DAILY    Hyperlipidemia with target LDL less than 70       blood glucose monitoring test strip    no brand specified    200 each    1 strip by In Vitro route 2 times daily Strips per patient's preference    Type 2 diabetes mellitus with diabetic polyneuropathy, without long-term current use of insulin (H)       cyclobenzaprine 10 MG tablet    FLEXERIL    90 tablet    TAKE 1 TABLET(10 MG) BY MOUTH THREE TIMES DAILY AS NEEDED FOR MUSCLE SPASMS    Acute right-sided thoracic back pain       cycloSPORINE 0.05 % ophthalmic emulsion    RESTASIS    180 each    Place 1 drop into both eyes 2 times daily    Keratitis sicca, bilateral       EPINEPHrine 0.3 MG/0.3ML injection 2-pack    EPIPEN/ADRENACLICK/or ANY BX GENERIC EQUIV    0.6 mL    Inject 0.3 mLs (0.3 mg) into the muscle once as needed for anaphylaxis    Allergic reaction, subsequent encounter       escitalopram 20 MG tablet    LEXAPRO    30 tablet    TAKE 1 TABLET BY MOUTH EVERY MORNING    Major depressive disorder, single episode, in partial remission (H)       folic acid 800 MCG Tabs      Take 800 mcg by mouth daily.        furosemide 40 MG tablet    LASIX    120 tablet    TAKE 2 TABLETS(80 MG) BY MOUTH TWICE DAILY    Chronic diastolic heart failure (H)       glimepiride 4 MG tablet    AMARYL    180 tablet    TAKE 1 TABLET BY MOUTH TWICE DAILY(WITH MEALS)    Type 2 diabetes mellitus with chronic kidney disease, without long-term  current use of insulin, unspecified CKD stage (H)       GUAIFENESIN  MG Tbcr     30    Take 600 mg by mouth twice daily        levothyroxine 150 MCG tablet    SYNTHROID/LEVOTHROID    90 tablet    Take 1 tablet (150 mcg) by mouth daily    Hypothyroidism, unspecified type       LIFESCAN FINEPOINT LANCETS Misc     100 each    Use to test blood sugars 2 times daily or as directed.    Type 2 diabetes mellitus with diabetic polyneuropathy, without long-term current use of insulin (H)       LYRICA 100 MG capsule   Generic drug:  pregabalin     180 capsule    TAKE ONE CAPSULE BY MOUTH TWICE DAILY    Myalgia       metaxalone 800 MG tablet    SKELAXIN    30 tablet    Take 1 tablet (800 mg) by mouth 3 times daily as needed for moderate pain    Chronic pain syndrome       * metFORMIN 500 MG 24 hr tablet    GLUCOPHAGE-XR     Take 500 mg by mouth 2 times daily (with meals) Reported on 5/23/2017        * metFORMIN 500 MG 24 hr tablet    GLUCOPHAGE-XR    360 tablet    TAKE 2 TABLETS BY MOUTH AFTER BREAKFAST AND SUPPER    Type 2 diabetes mellitus with diabetic polyneuropathy, without long-term current use of insulin (H)       Miconazole Nitrate 2 % Powd     100 g    Apply to rash on chest three times daily.  Continue for one week after the rash has resolved.    Intertrigo       niacin 500 MG CR tablet    NIASPAN    180 tablet    TAKE 1 TABLET(500 MG) BY MOUTH TWICE DAILY    Hyperlipidemia with target LDL less than 70       nitroGLYcerin 0.4 MG sublingual tablet    NITROSTAT    25 tablet    Place 1 tablet (0.4 mg) under the tongue every 5 minutes as needed for chest pain As needed for chest pain.    Coronary atherosclerosis of unspecified type of vessel, native or graft       nystatin 023980 UNIT/ML suspension    MYCOSTATIN    280 mL    Take 5 mLs (500,000 Units) by mouth 4 times daily for 7 days    Thrush       ONE TOUCH ULTRA (DEVICES) Misc     1    test blood sugar BID    Type II or unspecified type diabetes mellitus without  mention of complication, not stated as uncontrolled       potassium chloride SA 10 MEQ CR tablet    K-DUR/KLOR-CON M    120 tablet    TAKE 2 TABLETS BY MOUTH TWICE DAILY    Hypokalemia, Chronic diastolic heart failure (H)       prochlorperazine 5 MG tablet    COMPAZINE    10 tablet    Take 1-2 tablets (5-10 mg) by mouth every 6 hours as needed (Headache)        senna-docusate 8.6-50 MG per tablet    SENOKOT-S;PERICOLACE    60 tablet    Take 1-2 tablets by mouth 2 times daily as needed for constipation    Constipation       sitagliptin 50 MG tablet    JANUVIA    90 tablet    Take 1 tablet (50 mg) by mouth daily    Type 2 diabetes mellitus with chronic kidney disease, without long-term current use of insulin, unspecified CKD stage (H)       TIZANIDINE HCL PO      Take 4 mg by mouth At Bedtime        traZODone 50 MG tablet    DESYREL    90 tablet    Take 3 tablets (150 mg) by mouth At Bedtime    Insomnia, unspecified type       ULORIC 40 MG Tabs tablet   Generic drug:  febuxostat     90 tablet    TAKE 1 TABLET(40 MG) BY MOUTH EVERY EVENING    Hyperuricemia       vitamin D3 5000 UNIT/ML Liqd      Take 10,000 Units by mouth daily        * Notice:  This list has 2 medication(s) that are the same as other medications prescribed for you. Read the directions carefully, and ask your doctor or other care provider to review them with you.

## 2018-03-26 NOTE — PATIENT INSTRUCTIONS
"FOOT CARE NURSES  If you are interested in having a foot care nurse come out to your   home, please call one of these contacts for more information:  [facility]  [phone] [facility]  [phone]   [facility]  [phone]  [place] [phone]  [place]   [facility]  [phone]  At [facility] [phone]                 DIABETES AND YOUR FEET    What effect does diabetes have on the feet?   Diabetes can result in several problems in the feet including ulcers (open sores) and amputations.  Two of the most important reasons why people develop foot problems when they have diabetes is :  1.  Neuropathy (loss of feeling) and 2.  Vascular disease (loss or decrease of blood flow).    What is neuropathy?  Neuropathy is a term used to describe a loss of nerve function.  Patients with diabetes are at risk of developing neuropathy if their sugars continue to run high and are above the normal value.  One theory for neuropathy is that the \"extra\" sugar in the body enters the nerves and is broken down.  These by-products build up in the nerve causing it to swell and impair nerve function.  Often times, this can be prevented by controlling your sugars, dieting and exercise.    How do I know if I have neuropathy?  When a person develops neuropathy, they usually begin to feel numbness or tingling in their feet and sometime in their legs.  Other symptoms may include painful burning or hot feet, tingling or feeling like insects or ants are crawling on your feet or legs.  If the diabetes is sever and the sugars run high for long periods of time, neuropathy can also occur in the hands.    What is vascular disease?  Vascular disease is a term used to describe a loss or decrease in circulation (blood flow).  There is a problem in getting blood and oxygen to areas that need it.  Similar to neuropathy, sugars can build up in the walls of the " arteries (blood vessels) and cause them to become swollen, thickened and hardened.  This decreases the amount of blood that can go to an area that needs it.  Though this is common in the legs of diabetic patients, it can also affect other arteries (blood vessels) in the body such as in the heart and eyes.    How do I know if I have vascular disease?  In the legs, vascular disease usually results in cramping.  Patients who develop leg cramps after walking the same distance every time (i.e. One block, half a mile, etc.) need to let their doctors know so that their circulation may be checked.  Cramps causing severe pain in the feet and/or legs while sleeping or cramps that go away when you stand may also be a sign of poor blood circulation.  Occasional cramping in cold weather or on rare occasions with activity may not be due to poor circulation, but you should inform your doctor.    How can these problems be prevented?  The key to prevention is good blood sugar control.  Poor blood sugar control is a big reason many of these problems start.  Physical activity (exercise) is a very good way to help decrease your blood sugars.  Exercise can lower your blood sugar, blood pressure, and cholesterol.  It also reduces your risk for heart disease and stroke, relieves stress, and strengthens your heart, muscles and bones.  In addition, regular activity helps insulin work better, improves your blood circulation, and keeps your joints flexible.  If you're trying to lose weight, a combination of exercise and wise food choices can help you reach your target weight and maintain it.      Diabetic Foot Care Recommendations    The following are recommendations for avoiding serious foot problems or injury    DO'S    1.Wash your feet with lukewarm water and a mild soap and then dry them thoroughly, especially between the toes.      2. Examine your feet daily looking for cuts, corns, blisters, cracks, etc..., especially after wearing new  shoes.  Make sure to look between your toes.  If you cannot see the bottom of your feet, set a mirror on the floor and hold your foot over it, or ask a spouse, friend or family member to examine your feet for you.  Contact your doctor immediately if new problems are noted or if sores are not healing.      3.  Immediately apply moisturizer to the tops and bottoms of your feet, avoiding areas between the toes.  Hand lotion (Intensive Care, Laura, Eucerin, Neutrogena, Curel, etc...) is sufficient unless your doctor prescribes a medicated lotion.  Apply sunscreen to your feet when going swimming outside.      4.Use clean comfortable shoes, wear white socks (if you have any bleeding or drainage, you will see it on white socks).  Socks should not have thick seams or cut off the circulation around the leg.  Break in new shoes slowly and rotate with older shoes until broken in.  Check the inside of your shoes with your hand to look for areas of irritation or objects that may have fallen into your shoes.        5. Keep slippers by the side of your bed for use during the night.      6. Shoes should be fitted by a professional and should not cause areas of irritation.  Check your feet regularly when wearing a new pair of shoes and replace them as needed.      7.  Talk to your doctor about proper exercise.  Exercise and stretching stimulate blood flow to your feet and maintain proper glucose levels.      8.  Monitor your blood glucose level as instructed by your doctor.  Notify your doctor immediately if your blood sugar is abnormally high or low.      9.  Cut your nails straight across, but then gently round any sharp edges with a cardboard nail file.  If you have neuropathy, peripheral vascular disease or cannot see that well to trim your own toenails, see a medical professional for care.    DON'Ts    1.  Do not soak your feet if you have an open sore.  Use only lukewarm water and always check the temperature with your hand  as hot water can easily burn your feet.        2.  Never use a hot water bottle or heating pad on your feet.  Also do not apply cold compresses to your feet.  With decreased sensation, you could burn or freeze your feet.        3.  Do not apply any of these to your feet:     - over the counter medicine for corns or warts     -  Harsh chemicals like boric acid     -  Do not self-treat corns, cuts, blisters or infections.  Always consult your doctor.        4.  Do not wear sandals, slippers or walk barefoot, especially on hot sand or concrete or other harsh surfaces.      5.  If you smoke, stop!!!              NAIL FUNGUS / ONYCHOMYCOSIS   Nail fungus is not a hygiene problem and will not likely lead to significant medical   problems. The nails may get thick causing pain and possibly local skin infection.   Treatments include debridement (trimming), oral antifungals, topical antifungals and complete removal of the nail. Most fungal nails are not treated.   Topicals such as tea tree oil can be helpful for surface fungus and may, at best, limit   progression. Over the counter creams (such as Lamisil) can also be used however, their effectiveness is also quite low.  Topical treatment with Pen lac is expensive and often not covered by insurance. Pen lac has an approximate 8% success rate. Topical therapy recommendations is to apply twice a day for at least 3-4 months as it takes 9 months for new nail to grow out.    Experts suggest soaking your feet for 15 to 20 minutes in a mixture of 1 cup vinegar to 4 cups warm water. Be sure to rinse well and pat your feet dry when you're done. You can soak your feet like this daily. But if your skin becomes irritated, try soaking only two to three times a week. Vicks VapoRub, as with vinegar, there have been no controlled clinical trials to assess the effectiveness of Vicks VapoRub on nail fungus, but there have been numerous anecdotal reports that it works. There's no consensus on  how often to apply this product, so check with your doctor before using it on your nails.     Oral therapies include Sporanox and Lamisil. Oral therapies are also expensive and not very effective. Side effects such as liver disease are the main concern. Return of fungus is common even if the treatment worked.       Body Mass Index (BMI)  Many things can cause foot and ankle problems. Foot structure, activity level, foot mechanics and injuries are common causes of pain.  One very important issue that often goes unmentioned is body weight. Extra weight can cause increased stress on muscles, ligaments, bones and tendons. Sometimes just a few extra pounds is all it takes to put one over her/his threshold. Without reducing that stress, it can be difficult to alleviate pain.   Some people are uncomfortable addressing this issue, but we feel it is important for you to think about it. As Foot & Ankle specialists, our job is addressing the lower extremity problem and possible causes.   Regarding extra body weight, we encourage patients to discuss diet and weight management plans with their primary care doctors. It is this team approach that gives you the best opportunity for pain relief and getting you back on your feet.

## 2018-03-29 ENCOUNTER — TELEPHONE (OUTPATIENT)
Dept: FAMILY MEDICINE | Facility: CLINIC | Age: 73
End: 2018-03-29

## 2018-03-29 DIAGNOSIS — M62.838 MUSCLE SPASM: Primary | ICD-10-CM

## 2018-03-29 NOTE — TELEPHONE ENCOUNTER
Prior Authorization Retail Medication Request    Medication/Dose: metaxalone (SKELAXIN) 800 MG tablet  ICD code (if different than what is on RX):  Previously Tried and Failed:  Rationale:    Insurance Name:    Insurance ID: [id number]     Pharmacy Information (if different than what is on RX)  Name:  Phone:    Please include previous medications tried and failed.  Please ask insurance for medications on formulary.    Ins prefers meloxicam, tizanidine.

## 2018-03-29 NOTE — LETTER
[facility]  [address] [place] [id number]  Phone: [phone]    April 17, 2018        Mariel Ribeiro  [address] [place] [address]          To Whom It May Concern:    I am writing this letter of appeal on behalf of my patient, Mariel Ribeiro.  Mariel has chronic back pain as well as other musculoskeletal pain related to fibromyalgia.  I am requesting an approval of the muscle relaxant metaxalone.  She has tried tizanidine and cyclobenzaprine.   Tizanidine and cyclobenzaprine both help, but they cause extreme drowsiness.  The remaining alternatives are NSAIDS, which are contraindicated given that she is diabetic and has stage three chronic renal insufficiency.  My hope is that metaxalone may be less sedating for her and still address her pain without nephrotoxicity.   Thank you for your consideration.     Please contact me for questions or concerns.      Sincerely,        Rafaela William MD

## 2018-03-31 NOTE — TELEPHONE ENCOUNTER
PRIOR AUTHORIZATION DENIED    Medication: metaxalone (SKELAXIN) 800 MG-DENIED    Denial Date: 3/31/2018    Denial Rational: Chronic pain syndrome [G89.4]  IS NOT AN APPROVED DX.          Appeal Information:  IF YOU WOULD LIKE TO APPEAL PLEASE SUPPLY PA TEAM WITH A LETTER OF MEDICAL NECESSITY WITH CLINICAL REASON.

## 2018-03-31 NOTE — TELEPHONE ENCOUNTER
Central Prior Authorization Team   Phone: [phone]    PA Initiation    Medication: metaxalone (SKELAXIN) 800 MG tablet, rec 3-23-18  Insurance Company: [employer] ([facility]) - Phone [phone] Fax [phone]  Pharmacy Filling the Rx: [id number] - [place], [place] - [address] AT [facility] [address]  Filling Pharmacy Phone: [phone]  Filling Pharmacy Fax: [phone]  Start Date: 3/31/2018

## 2018-04-02 RX ORDER — METAXALONE 800 MG/1
800 TABLET ORAL 3 TIMES DAILY PRN
Qty: 30 TABLET | Refills: 1 | Status: SHIPPED | OUTPATIENT
Start: 2018-04-02 | End: 2018-05-22

## 2018-04-02 NOTE — TELEPHONE ENCOUNTER
I am changing chronic pain syndrome to muscle spasm. I'm not sure if that helps.     She has tried a few other muscle relaxants, which you can see in Epic.  They all make her too sleepy.  She is finally off of narcotics and I'm hoping to manage her pain with a muscle relaxant that does not cause too much drowsiness.  Skelaxin is less likely to cause sleepiness the way that flexeril and tizanidine do.  If there is a similar medication that is nondrowsy that is formulary, we can try that instead but I need to know what it is.     Thank you.

## 2018-04-02 NOTE — TELEPHONE ENCOUNTER
PA was denied. Please order alternative med with complete SIG or begin appeal process.     If you would like to appeal:   Create letter of medical necessity or    Compile supporting clinical documentation in EPIC Telephone encounter (TE).    Route TE to: [facility].    Denial Rational: Chronic pain syndrome [G89.4]  IS NOT AN APPROVED DX.

## 2018-04-13 DIAGNOSIS — F32.4 MAJOR DEPRESSIVE DISORDER, SINGLE EPISODE, IN PARTIAL REMISSION (H): ICD-10-CM

## 2018-04-13 RX ORDER — ESCITALOPRAM OXALATE 20 MG/1
20 TABLET ORAL EVERY MORNING
Qty: 90 TABLET | Refills: 3 | Status: ON HOLD | OUTPATIENT
Start: 2018-04-13 | End: 2018-07-22

## 2018-04-13 RX ORDER — ESCITALOPRAM OXALATE 20 MG/1
TABLET ORAL
Qty: 30 TABLET | Refills: 0 | Status: SHIPPED | OUTPATIENT
Start: 2018-04-13 | End: 2018-04-13

## 2018-04-13 NOTE — TELEPHONE ENCOUNTER
"Requested Prescriptions   Pending Prescriptions Disp Refills     escitalopram (LEXAPRO) 20 MG tablet [Pharmacy Med Name: ESCITALOPRAM 20MG TABLETS] 30 tablet 0     Sig: TAKE 1 TABLET BY MOUTH EVERY MORNING    SSRIs Protocol Failed    4/13/2018  3:46 AM       Failed - PHQ-9 score less than 5 in past 6 months    Please review last PHQ-9 score.          Passed - Patient is age 18 or older       Passed - No active pregnancy on record       Passed - No positive pregnancy test in last 12 months       Passed - Recent (6 mo) or future (30 days) visit within the authorizing provider's specialty    Patient had office visit in the last 6 months or has a visit in the next 30 days with authorizing provider or within the authorizing provider's specialty.  See \"Patient Info\" tab in inbasket, or \"Choose Columns\" in Meds & Orders section of the refill encounter.            PHQ-9 SCORE 5/19/2017 9/21/2017 3/19/2018   Total Score - - -   Total Score MyChart - - -   Total Score 11 3 15     Routing refill request to provider for review/approval because:  Rimma given x1 and patient did not follow up, please advise    out of range:  phq9 >4        "

## 2018-04-16 NOTE — TELEPHONE ENCOUNTER
PA was denied. Please order alternative med with complete SIG or begin appeal process.     If you would like to appeal:   Create letter of medical necessity or    Compile supporting clinical documentation in EPIC Telephone encounter (TE).    Route TE to: [facility].

## 2018-04-16 NOTE — TELEPHONE ENCOUNTER
Ok, I am sending in tizanidine in the meantime.     Please write an appeal.    Most of those options are NSAIDs; she has diabetes and chronic kidney disease stage 3 and therefore NSAIDs are contraindicated.  She has already tried flexeril and tizanidine--both cause excessive sleepiness.   The rest of the options are nsaids.

## 2018-04-16 NOTE — TELEPHONE ENCOUNTER
PREFERRED ALTERNATIVES PER INS:  The following alternative(s) is/are the preferred alternative(s):   Celecoxib, cyclobenzaprine, diflunisal, etodolac, ibuprofen, ketoprofen, meloxicam, naproxen or naproxen delayed-release (DR), and tizanidine tablets.   If MD would like to appeal we will need a letter of medical necessity stating why PT is unable to try alternatives and or have a contraindication to alts.  Since PA was submitted with-in the 90 day window - we will have to appeal instead of re-submitting

## 2018-04-17 NOTE — TELEPHONE ENCOUNTER
IF YOU WOULD LIKE TO APPEAL PLEASE SUPPLY PA TEAM WITH A LETTER OF MEDICAL NECESSITY WITH CLINICAL REASON.  Most INS prefer this for an appeal now - thank you  Please let us know when letter is scanned into chart

## 2018-04-17 NOTE — TELEPHONE ENCOUNTER
Dr William, providers are supposed to generate letters of appeal and route them to the central pa pool ([facility]). [facility] staff does not work on pa's anymore.

## 2018-04-17 NOTE — TELEPHONE ENCOUNTER
Central prior authorization workflow has providers generating the letter of appeal and routing directly to central pa pool (P [facility]). [facility] staff does not work on pa's any longer.       Lyndon Turcios MA

## 2018-04-19 ENCOUNTER — OFFICE VISIT (OUTPATIENT)
Dept: FAMILY MEDICINE | Facility: CLINIC | Age: 73
End: 2018-04-19
Payer: MEDICARE

## 2018-04-19 VITALS
OXYGEN SATURATION: 95 % | SYSTOLIC BLOOD PRESSURE: 139 MMHG | HEART RATE: 73 BPM | TEMPERATURE: 96.9 F | DIASTOLIC BLOOD PRESSURE: 82 MMHG

## 2018-04-19 DIAGNOSIS — R07.0 THROAT PAIN: ICD-10-CM

## 2018-04-19 DIAGNOSIS — L01.00 IMPETIGO: ICD-10-CM

## 2018-04-19 DIAGNOSIS — J44.1 COPD EXACERBATION (H): Primary | ICD-10-CM

## 2018-04-19 LAB
DEPRECATED S PYO AG THROAT QL EIA: NORMAL
SPECIMEN SOURCE: NORMAL

## 2018-04-19 PROCEDURE — 87880 STREP A ASSAY W/OPTIC: CPT | Performed by: FAMILY MEDICINE

## 2018-04-19 PROCEDURE — 87081 CULTURE SCREEN ONLY: CPT | Performed by: FAMILY MEDICINE

## 2018-04-19 PROCEDURE — 99214 OFFICE O/P EST MOD 30 MIN: CPT | Performed by: FAMILY MEDICINE

## 2018-04-19 RX ORDER — DOXYCYCLINE 100 MG/1
100 CAPSULE ORAL 2 TIMES DAILY
Qty: 20 CAPSULE | Refills: 0 | Status: SHIPPED | OUTPATIENT
Start: 2018-04-19 | End: 2018-05-22

## 2018-04-19 RX ORDER — PREDNISONE 20 MG/1
TABLET ORAL
Qty: 26 TABLET | Refills: 0 | Status: SHIPPED | OUTPATIENT
Start: 2018-04-19 | End: 2018-05-22

## 2018-04-19 NOTE — PROGRESS NOTES
HPI      ROS      Physical Exam      SUBJECTIVE:   Mariel Ribeiro is a 72 year old female who presents to clinic today for the following health issues:    RESPIRATORY SYMPTOMS      Duration: x1 month      Description  Cough, sore throat, weak, wheezing, phlegm,     Severity: mild    Accompanying signs and symptoms: None    History (predisposing factors):  Pt's roommate was sick     Therapies tried and outcome:  Tylenol     Mariel says that ever since her last visit, she has had cough productive of yellow phlegm, sinus congestion and pressure, headaches, increased wheezing and shortness of breath.  She feels very fatigued and is sleeping a lot. No hemoptysis or chest pain.  She has felt some subjective fevers and chills.  Using her albuterol inhaler is very temporarily helpful for the respiratory symptoms.  She also has a sore throat.  No vomiting or diarrhea.  The coughing is making her diastasis recti worse; she complains that it is uncomfortable and gets in the way of her breathing.  She had discussed this last visit, and I had recommended for her to f/u with her surgeon (this has been bothering her more since cholecystectomy/umbilical hernia repair).    She also continues to have crusted drainage from her umbilicus.      Joint pains: MCPs of both hands become so stiff, painful and swollen that sometimes she cannot hold utensils to eat.  No redness.  They are puffed up now.         Problem list and histories reviewed & adjusted, as indicated.  Additional history: as documented    Patient Active Problem List   Diagnosis     Hypothyroidism      HX PHYSICAL ABUSE     Coronary atherosclerosis     Myalgia and myositis     Insomnia     Migraine     Chronic airway obstruction/ COPD     Mononeuritis     FAMILY HX-THROMBOSIS     Obstructive sleep apnea     Vitamin D deficiency     Hyperuricemia     Type 2 diabetes mellitus with diabetic chronic kidney disease (H)     H/O chronic kidney disease     Hypertension goal BP  (blood pressure) < 130/80     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)     Hyperlipidemia with target LDL less than 70     Chronic diastolic heart failure (H)     Intermediate coronary syndrome (H)     Osteoarthritis     Morbid obesity (H)     Chest pain     Advanced directives, counseling/discussion     Arthritis of knee     Total knee replacement status     Constipation     Hypokalemia     Transient cerebral ischemia     Spells     Pain in joint of left shoulder     History of thyroid cancer     Status post coronary angiogram     Cervicalgia     Cholelithiasis     Pancreatitis, acute     Postoperative state     Fatty liver disease, nonalcoholic     Hepatomegaly     Type 2 diabetes mellitus with diabetic polyneuropathy, without long-term current use of insulin (H)     Past Surgical History:   Procedure Laterality Date     ANGIOGRAPHY  8/11    with stent placement X2 mid- and proximal LAD     ARTHROPLASTY KNEE  1/28/2014    Procedure: ARTHROPLASTY KNEE;  ARTHROPLASTY KNEE -RIGHT TOTAL  ;  Surgeon: Victor Manuel Wolff MD;  Location: [facility]     C TOTAL KNEE ARTHROPLASTY  7/30/04    Knee Replacement, Total right and left     CATARACT IOL, RT/LT Bilateral      COLONOSCOPY       DILATION AND CURETTAGE, OPERATIVE HYSTEROSCOPY WITH MORCELLATOR, COMBINED N/A 11/1/2016    Procedure: COMBINED DILATION AND CURETTAGE, OPERATIVE HYSTEROSCOPY WITH MORCELLATOR;  Surgeon: Stacey Arnold DO;  Location: [facility] INTRODUCE NEEDLE/CATH, EXTREMITY ARTERY  1999,2002,2004    Angiocardiogram     HC KNEE SCOPE, DIAGNOSTIC  1990's    Arthroscopy, Knee, bilateral     LAPAROSCOPIC CHOLECYSTECTOMY N/A 8/11/2017    Procedure: LAPAROSCOPIC CHOLECYSTECTOMY;  Laparoscopic Cholecystectomy   *Latex Allergy*, Anesthesia Block;  Surgeon: Jeffrey Roberson MD;  Location: [facility]     PHACOEMULSIFICATION CLEAR CORNEA WITH STANDARD INTRAOCULAR LENS IMPLANT Right 12/29/2014    Procedure: PHACOEMULSIFICATION CLEAR CORNEA WITH STANDARD INTRAOCULAR  LENS IMPLANT;  Surgeon: Smith Quintana MD;  Location: [facility]     PHACOEMULSIFICATION CLEAR CORNEA WITH TORIC INTRAOCULAR LENS IMPLANT Left 1/12/2015    Procedure: PHACOEMULSIFICATION CLEAR CORNEA WITH TORIC INTRAOCULAR LENS IMPLANT;  Surgeon: Smith Quintana MD;  Location: [facility]     SURGICAL HISTORY OF -   1963    dentures     SURGICAL HISTORY OF -   1985    thyroidectomy     SURGICAL HISTORY OF -   1998    right thumb surgery     SURGICAL HISTORY OF -   2001    right breast biopsy (benign)     SURGICAL HISTORY OF -   04/2004    left shoulder surgery - rotator cuff     SURGICAL HISTORY OF -   4/09    left thumb surgery     THYROIDECTOMY         Social History   Substance Use Topics     Smoking status: Former Smoker     Packs/day: 0.00     Years: 27.00     Types: Cigarettes     Quit date: 7/1/1989     Smokeless tobacco: Never Used     Alcohol use No     Family History   Problem Relation Age of Onset     C.A.D. Mother      DIABETES Mother      Hypertension Mother      Blood Disease Mother      multiple episodes of thrombosis     Circulatory Mother      DVT X 2; ocular clot; cerebral; carotid artery stenosis     Glaucoma Mother      Macular Degeneration Mother      C.A.D. Father      Hypertension Father      CEREBROVASCULAR DISEASE Father      Alcohol/Drug Father      etoh     CANCER Brother      liver,pancreas, brain     Cardiovascular Sister      Hypertension Sister      Hypertension Brother      Alcohol/Drug Sister      etoh     Alcohol/Drug Brother      drug     DIABETES Sister      younger     C.A.D. Sister      CABG age 65     C.A.D. Brother      CABG age 42     C.A.D. Sister      stents age 58     C.A.D. Brother      Genitourinary Problems Sister      kidney disease         Current Outpatient Prescriptions   Medication Sig Dispense Refill     doxycycline (VIBRAMYCIN) 100 MG capsule Take 1 capsule (100 mg) by mouth 2 times daily 20 capsule 0     predniSONE (DELTASONE) 20 MG tablet Take 3 tabs by  mouth daily for 4 days, then 2 tabs daily for 4 days, then 1 tab daily for 4 days, then 1/2 tab daily for 4 days, then stop. 26 tablet 0     albuterol (ALBUTEROL) 108 (90 BASE) MCG/ACT Inhaler INHALE 1 TO 2 PUFFS EVERY 4 TO 6 HOURS AS NEEDED 1 Inhaler PRN     aspirin  MG tablet Take 1 tablet by mouth daily. 30 tablet 0     atorvastatin (LIPITOR) 40 MG tablet TAKE 1 TABLET(40 MG) BY MOUTH DAILY 90 tablet 3     blood glucose monitoring (NO BRAND SPECIFIED) test strip 1 strip by In Vitro route 2 times daily Strips per patient's preference 200 each 3     Cholecalciferol (VITAMIN D3) 5000 UNIT/ML LIQD Take 10,000 Units by mouth daily        cyclobenzaprine (FLEXERIL) 10 MG tablet TAKE 1 TABLET(10 MG) BY MOUTH THREE TIMES DAILY AS NEEDED FOR MUSCLE SPASMS 90 tablet 3     cycloSPORINE (RESTASIS) 0.05 % ophthalmic emulsion Place 1 drop into both eyes 2 times daily 180 each 3     EPINEPHrine (EPIPEN/ADRENACLICK/OR ANY BX GENERIC EQUIV) 0.3 MG/0.3ML injection 2-pack Inject 0.3 mLs (0.3 mg) into the muscle once as needed for anaphylaxis 0.6 mL 0     escitalopram (LEXAPRO) 20 MG tablet Take 1 tablet (20 mg) by mouth every morning 90 tablet 3     Folic Acid 800 MCG TABS Take 800 mcg by mouth daily.       furosemide (LASIX) 40 MG tablet TAKE 2 TABLETS(80 MG) BY MOUTH TWICE DAILY 120 tablet 3     glimepiride (AMARYL) 4 MG tablet TAKE 1 TABLET BY MOUTH TWICE DAILY(WITH MEALS) 180 tablet 3     GUAIFENESIN  MG OR TBCR Take 600 mg by mouth twice daily 30 0     levothyroxine (SYNTHROID/LEVOTHROID) 150 MCG tablet Take 1 tablet (150 mcg) by mouth daily 90 tablet 3     [employer] LANCETS MISC Use to test blood sugars 2 times daily or as directed. 100 each prn     LYRICA 100 MG capsule TAKE ONE CAPSULE BY MOUTH TWICE DAILY 180 capsule 3     metaxalone (SKELAXIN) 800 MG tablet Take 1 tablet (800 mg) by mouth 3 times daily as needed for moderate pain 30 tablet 1     metFORMIN (GLUCOPHAGE-XR) 500 MG 24 hr tablet Take 500  mg by mouth 2 times daily (with meals) Reported on 5/23/2017       metFORMIN (GLUCOPHAGE-XR) 500 MG 24 hr tablet TAKE 2 TABLETS BY MOUTH AFTER BREAKFAST AND SUPPER 360 tablet 0     Miconazole Nitrate 2 % POWD Apply to rash on chest three times daily.  Continue for one week after the rash has resolved. 100 g 3     niacin (NIASPAN) 500 MG CR tablet TAKE 1 TABLET(500 MG) BY MOUTH TWICE DAILY 180 tablet 1     nitroglycerin (NITROSTAT) 0.4 MG SL tablet Place 1 tablet (0.4 mg) under the tongue every 5 minutes as needed for chest pain As needed for chest pain. 25 tablet PRN     ONE TOUCH ULTRA (DEVICES) MISC test blood sugar BID 1 0     potassium chloride SA (K-DUR/KLOR-CON M) 10 MEQ CR tablet TAKE 2 TABLETS BY MOUTH TWICE DAILY 120 tablet 3     prochlorperazine (COMPAZINE) 5 MG tablet Take 1-2 tablets (5-10 mg) by mouth every 6 hours as needed (Headache) 10 tablet 0     senna-docusate (SENOKOT-S;PERICOLACE) 8.6-50 MG per tablet Take 1-2 tablets by mouth 2 times daily as needed for constipation 60 tablet 0     sitagliptin (JANUVIA) 50 MG tablet Take 1 tablet (50 mg) by mouth daily 90 tablet 3     tiZANidine (ZANAFLEX) 4 MG tablet Take 1-2 tablets (4-8 mg) by mouth 3 times daily as needed 90 tablet 0     traZODone (DESYREL) 50 MG tablet Take 3 tablets (150 mg) by mouth At Bedtime 90 tablet 7     ULORIC 40 MG TABS tablet TAKE 1 TABLET(40 MG) BY MOUTH EVERY EVENING 90 tablet 1     Allergies   Allergen Reactions     Contrast Dye Anaphylaxis     Fish Allergy Anaphylaxis     Iodine Anaphylaxis     Oxycodone Other (See Comments)     Severe suicidal tendencies on this medication     Tree Nuts [Nuts] Anaphylaxis     Tree nuts only (peanuts ok)     Albuterol Other (See Comments)     Sandrita-oral erythema     Bactrim      Increased uric acid     Betadine [Povidone Iodine] Hives, Swelling and Difficulty breathing     Betadine     Combivent      Rash     Dulaglutide Other (See Comments)     Hx . Of thyroid cancer.     Lisinopril Other  (See Comments)     Scr/grf severely reduced.      Penicillins Rash     Allopurinol Rash     Latex Rash     Wool Fiber Rash       Reviewed and updated as needed this visit by clinical staff  Tobacco  Allergies  Med Hx  Surg Hx  Fam Hx  Soc Hx      Reviewed and updated as needed this visit by Provider         ROS:  Constitutional, HEENT, cardiovascular, pulmonary, gi and gu systems are negative, except as otherwise noted.    OBJECTIVE:     /82 (BP Location: Right arm, Patient Position: Sitting, Cuff Size: Adult Regular)  Pulse 73  Temp 96.9  F (36.1  C) (Tympanic)  SpO2 95%  There is no height or weight on file to calculate BMI.  GENERAL: appears fatigued, alert and no distress  EYES: Eyes grossly normal to inspection, PERRL and conjunctivae and sclerae normal  HENT: ear canals and TM's normal, nose and mouth without ulcers or lesions  NECK: no adenopathy, right anterior neck cyst as before.  RESP: decreased air movement bilaterally, bilateral diffuse wheezing; no rales heard, though it's very tight.  Able to speak in complete sentences with minimal dyspnea.  CV: regular rate and rhythm, normal S1 S2, no S3 or S4, no murmur, click or rub, no peripheral edema and peripheral pulses strong  ABDOMEN: diastasis recti vs ventral hernia is firm but nontender; active bowel sounds.   SKIN: yellow crusting to the umbilicus, no erythema  PSYCH: mentation appears normal, affect normal    Diagnostic Test Results:  Strep negative.    ASSESSMENT/PLAN:   73 yo F with DM2, HTN, CAD, BELEN, and HRpEF presents with cough, wheezing and shortness of breath consistent with COPD exacerbation.  Given increase in sputum production and purulence, I am treating with doxycycline in addition to a prednisone course.  In the past, Mariel has required somewhat longer prednisone courses.  Monitor blood sugars and call if frequently >300.  Recommended inhaler use q4-6 hours while awake, mucinex as needed.  I am prescribing some mupirocin  for her umbilical drainage/crusting.  If worsening dyspnea, go to ER.  Otherwise if improving as expected f/u with me in 7-10 days towards the end of the steroid taper to determine if we need to go longer.           1. Throat pain    - Strep, Rapid Screen  - Beta strep group A culture    2. COPD exacerbation (H)    - predniSONE (DELTASONE) 20 MG tablet; Take 3 tabs by mouth daily for 4 days, then 2 tabs daily for 4 days, then 1 tab daily for 4 days, then 1/2 tab daily for 4 days, then stop.  Dispense: 26 tablet; Refill: 0  - doxycycline (VIBRAMYCIN) 100 MG capsule; Take 1 capsule (100 mg) by mouth 2 times daily  Dispense: 20 capsule; Refill: 0    3. Impetigo  - mupirocin (BACTROBAN) 2 % ointment; Apply topically 3 times daily for 5 days  Dispense: 22 g; Refill: 1    4.  Joint pain in hands: would likely improve symptomatically with prednisone.  Consider RA work up.      Rafaela William MD  [facility]

## 2018-04-19 NOTE — MR AVS SNAPSHOT
After Visit Summary   4/19/2018    Mariel Ribeiro    MRN: [id number]           Patient Information     Date Of Birth          1945        Visit Information        Provider Department      4/19/2018 1:00 PM Rafaela William MD [facility]        Today's Diagnoses     Throat pain    -  1    COPD exacerbation (H)          Care Instructions    Prednisone--a tapering course     Doxycycline--antibiotic to treat sinus and chest infection/pneumonia.     Inhaler--use at least every 4-6 hours while awake.               Follow-ups after your visit        Your next 10 appointments already scheduled     Jun 27, 2018  4:40 PM CDT   (Arrive by 4:25 PM)   RETURN HEART FAILURE with Stephanie Wolf MD   [facility] ([facility])    [address] [place] [address]  [address] [place] [id number] [phone]              Who to contact     If you have questions or need follow up information about today's clinic visit or your schedule please contact [facility] directly at [phone].  Normal or non-critical lab and imaging results will be communicated to you by [employer], letter or phone within 4 business days after the clinic has received the results. If you do not hear from us within 7 days, please contact the clinic through [employer] or phone. If you have a critical or abnormal lab result, we will notify you by phone as soon as possible.  Submit refill requests through [employer] or call your pharmacy and they will forward the refill request to us. Please allow 3 business days for your refill to be completed.          Additional Information About Your Visit        MyChart Information     [employer] gives you secure access to your electronic health record. If you see a primary care provider, you can also send messages to your care team and make appointments. If you have questions, please call your primary care clinic.  If you do not have a primary  care provider, please call [phone] and they will assist you.        Care EveryWhere ID     This is your Care EveryWhere ID. This could be used by other organizations to access your [facility] medical records  [id number]        Your Vitals Were     Pulse Temperature Pulse Oximetry             73 96.9  F (36.1  C) (Tympanic) 95%          Blood Pressure from Last 3 Encounters:   04/19/18 139/82   03/26/18 162/74   03/19/18 142/72    Weight from Last 3 Encounters:   03/26/18 305 lb (138.3 kg)   03/19/18 (!) 305 lb (138.3 kg)   11/15/17 298 lb (135.2 kg)              We Performed the Following     Strep, Rapid Screen          Today's Medication Changes          These changes are accurate as of 4/19/18  1:38 PM.  If you have any questions, ask your nurse or doctor.               Start taking these medicines.        Dose/Directions    doxycycline 100 MG capsule   Commonly known as:  VIBRAMYCIN   Used for:  COPD exacerbation (H)   Started by:  Rafaela William MD        Dose:  100 mg   Take 1 capsule (100 mg) by mouth 2 times daily   Quantity:  20 capsule   Refills:  0       predniSONE 20 MG tablet   Commonly known as:  DELTASONE   Used for:  COPD exacerbation (H)   Started by:  Rafaela William MD        Take 3 tabs by mouth daily for 4 days, then 2 tabs daily for 4 days, then 1 tab daily for 4 days, then 1/2 tab daily for 4 days, then stop.   Quantity:  26 tablet   Refills:  0            Where to get your medicines      These medications were sent to [id number] - [place], [place] - [address] AT [facility] [address]  [facility] [address], [place] [address]    Hours:  24-hours Phone:  [phone]     doxycycline 100 MG capsule    predniSONE 20 MG tablet                Primary Care Provider Office Phone # Fax #    Rafaela William [phone] [address] [place] [address]        Equal Access to Services     KJ SALVADOR AH: Marcelina fowler  Mario, wajamesda luqadaha, qaybta kalennox taylor, gopal madrid miguelinamela reichdl pelon. So [facility] [phone].    ATENCIÓN: Si hilary hernandez, tiene a gillespie disposición servicios gratuitos de asistencia lingüística. Hebert al [phone].    We comply with applicable federal civil rights laws and [place] laws. We do not discriminate on the basis of race, color, national origin, age, disability, sex, sexual orientation, or gender identity.            Thank you!     Thank you for choosing [facility]  for your care. Our goal is always to provide you with excellent care. Hearing back from our patients is one way we can continue to improve our services. Please take a few minutes to complete the written survey that you may receive in the mail after your visit with us. Thank you!             Your Updated Medication List - Protect others around you: Learn how to safely use, store and throw away your medicines at www.disposemymeds.org.          This list is accurate as of 4/19/18  1:38 PM.  Always use your most recent med list.                   Brand Name Dispense Instructions for use Diagnosis    albuterol 108 (90 Base) MCG/ACT Inhaler    PROAIR HFA    1 Inhaler    INHALE 1 TO 2 PUFFS EVERY 4 TO 6 HOURS AS NEEDED    Chronic obstructive pulmonary disease with acute exacerbation (H)       aspirin 325 MG EC tablet     30 tablet    Take 1 tablet by mouth daily.        atorvastatin 40 MG tablet    LIPITOR    90 tablet    TAKE 1 TABLET(40 MG) BY MOUTH DAILY    Hyperlipidemia with target LDL less than 70       blood glucose monitoring test strip    no brand specified    200 each    1 strip by In Vitro route 2 times daily Strips per patient's preference    Type 2 diabetes mellitus with diabetic polyneuropathy, without long-term current use of insulin (H)       cyclobenzaprine 10 MG tablet    FLEXERIL    90 tablet    TAKE 1 TABLET(10 MG) BY MOUTH THREE TIMES DAILY AS NEEDED FOR MUSCLE SPASMS    Acute  right-sided thoracic back pain       cycloSPORINE 0.05 % ophthalmic emulsion    RESTASIS    180 each    Place 1 drop into both eyes 2 times daily    Keratitis sicca, bilateral       doxycycline 100 MG capsule    VIBRAMYCIN    20 capsule    Take 1 capsule (100 mg) by mouth 2 times daily    COPD exacerbation (H)       EPINEPHrine 0.3 MG/0.3ML injection 2-pack    EPIPEN/ADRENACLICK/or ANY BX GENERIC EQUIV    0.6 mL    Inject 0.3 mLs (0.3 mg) into the muscle once as needed for anaphylaxis    Allergic reaction, subsequent encounter       escitalopram 20 MG tablet    LEXAPRO    90 tablet    Take 1 tablet (20 mg) by mouth every morning    Major depressive disorder, single episode, in partial remission (H)       folic acid 800 MCG Tabs      Take 800 mcg by mouth daily.        furosemide 40 MG tablet    LASIX    120 tablet    TAKE 2 TABLETS(80 MG) BY MOUTH TWICE DAILY    Chronic diastolic heart failure (H)       glimepiride 4 MG tablet    AMARYL    180 tablet    TAKE 1 TABLET BY MOUTH TWICE DAILY(WITH MEALS)    Type 2 diabetes mellitus with chronic kidney disease, without long-term current use of insulin, unspecified CKD stage (H)       GUAIFENESIN  MG Tbcr     30    Take 600 mg by mouth twice daily        levothyroxine 150 MCG tablet    SYNTHROID/LEVOTHROID    90 tablet    Take 1 tablet (150 mcg) by mouth daily    Hypothyroidism, unspecified type       [employer] LANCETS Misc     100 each    Use to test blood sugars 2 times daily or as directed.    Type 2 diabetes mellitus with diabetic polyneuropathy, without long-term current use of insulin (H)       LYRICA 100 MG capsule   Generic drug:  pregabalin     180 capsule    TAKE ONE CAPSULE BY MOUTH TWICE DAILY    Myalgia       metaxalone 800 MG tablet    SKELAXIN    30 tablet    Take 1 tablet (800 mg) by mouth 3 times daily as needed for moderate pain    Muscle spasm       * metFORMIN 500 MG 24 hr tablet    GLUCOPHAGE-XR     Take 500 mg by mouth 2 times daily  (with meals) Reported on 5/23/2017        * metFORMIN 500 MG 24 hr tablet    GLUCOPHAGE-XR    360 tablet    TAKE 2 TABLETS BY MOUTH AFTER BREAKFAST AND SUPPER    Type 2 diabetes mellitus with diabetic polyneuropathy, without long-term current use of insulin (H)       Miconazole Nitrate 2 % Powd     100 g    Apply to rash on chest three times daily.  Continue for one week after the rash has resolved.    Intertrigo       niacin 500 MG CR tablet    NIASPAN    180 tablet    TAKE 1 TABLET(500 MG) BY MOUTH TWICE DAILY    Hyperlipidemia with target LDL less than 70       nitroGLYcerin 0.4 MG sublingual tablet    NITROSTAT    25 tablet    Place 1 tablet (0.4 mg) under the tongue every 5 minutes as needed for chest pain As needed for chest pain.    Coronary atherosclerosis of unspecified type of vessel, native or graft       ONE TOUCH ULTRA (DEVICES) Misc     1    test blood sugar BID    Type II or unspecified type diabetes mellitus without mention of complication, not stated as uncontrolled       potassium chloride SA 10 MEQ CR tablet    K-DUR/KLOR-CON M    120 tablet    TAKE 2 TABLETS BY MOUTH TWICE DAILY    Hypokalemia, Chronic diastolic heart failure (H)       predniSONE 20 MG tablet    DELTASONE    26 tablet    Take 3 tabs by mouth daily for 4 days, then 2 tabs daily for 4 days, then 1 tab daily for 4 days, then 1/2 tab daily for 4 days, then stop.    COPD exacerbation (H)       prochlorperazine 5 MG tablet    COMPAZINE    10 tablet    Take 1-2 tablets (5-10 mg) by mouth every 6 hours as needed (Headache)        senna-docusate 8.6-50 MG per tablet    SENOKOT-S;PERICOLACE    60 tablet    Take 1-2 tablets by mouth 2 times daily as needed for constipation    Constipation       sitagliptin 50 MG tablet    JANUVIA    90 tablet    Take 1 tablet (50 mg) by mouth daily    Type 2 diabetes mellitus with chronic kidney disease, without long-term current use of insulin, unspecified CKD stage (H)       tiZANidine 4 MG tablet     ZANAFLEX    90 tablet    Take 1-2 tablets (4-8 mg) by mouth 3 times daily as needed    Muscle spasm       traZODone 50 MG tablet    DESYREL    90 tablet    Take 3 tablets (150 mg) by mouth At Bedtime    Insomnia, unspecified type       ULORIC 40 MG Tabs tablet   Generic drug:  febuxostat     90 tablet    TAKE 1 TABLET(40 MG) BY MOUTH EVERY EVENING    Hyperuricemia       vitamin D3 5000 UNIT/ML Liqd      Take 10,000 Units by mouth daily        * Notice:  This list has 2 medication(s) that are the same as other medications prescribed for you. Read the directions carefully, and ask your doctor or other care provider to review them with you.

## 2018-04-19 NOTE — PATIENT INSTRUCTIONS
Prednisone--a tapering course     Doxycycline--antibiotic to treat sinus and chest infection/pneumonia.     Inhaler--use at least every 4-6 hours while awake.

## 2018-04-20 LAB
BACTERIA SPEC CULT: NORMAL
SPECIMEN SOURCE: NORMAL

## 2018-04-20 NOTE — TELEPHONE ENCOUNTER
Medication Appeal Initiation    We have initiated an appeal for the requested medication:  Medication: metaxalone (SKELAXIN) 800 MG-DENIED  Appeal Start Date:  4/20/2018  Insurance Company: [employer] ([facility]) - Phone [phone] Fax [phone]  Comments:       Faxed appeal to [phone] will await response

## 2018-04-21 ENCOUNTER — TELEPHONE (OUTPATIENT)
Dept: FAMILY MEDICINE | Facility: CLINIC | Age: 73
End: 2018-04-21

## 2018-04-21 DIAGNOSIS — E11.22 TYPE 2 DIABETES MELLITUS WITH CHRONIC KIDNEY DISEASE, WITHOUT LONG-TERM CURRENT USE OF INSULIN, UNSPECIFIED CKD STAGE (H): ICD-10-CM

## 2018-04-21 RX ORDER — MUPIROCIN 20 MG/G
OINTMENT TOPICAL 3 TIMES DAILY
Qty: 22 G | Refills: 1 | Status: SHIPPED | OUTPATIENT
Start: 2018-04-21 | End: 2018-04-26

## 2018-04-21 NOTE — TELEPHONE ENCOUNTER
Please call patient and check on how she is feeling.  Was seen with COPD exacerbation.   Also---is the crusting in her belly button improved? If not, I've sent in topical mupirocin antibiotic, which will do better than the bacitracin I gave her if it didn't work.    Also, she is taking prednisone which raises blood sugar; please see how her sugars are doing.  Thanks.  Thank you.

## 2018-04-26 DIAGNOSIS — E11.42 TYPE 2 DIABETES MELLITUS WITH DIABETIC POLYNEUROPATHY, WITHOUT LONG-TERM CURRENT USE OF INSULIN (H): Primary | ICD-10-CM

## 2018-04-26 NOTE — TELEPHONE ENCOUNTER
So, for today, have her take one additional glimeperide with food.    Start lantus 5u tonight, and continue nightly.    Call tomorrow with blood sugar.

## 2018-04-26 NOTE — TELEPHONE ENCOUNTER
Two options:     1.  She can increase the meds she is taking now:  Have her take 6mg of glimepiride (so 1.5 tabs) daily and increase her januvia to 2 tabs (100mg) daily.      OR:    2.  She could keep her usual meds the same and add some lantus insulin.  I believe she has taken this in the past.  If she's comfortable giving herself shots , then this is probably a better way to go.  I can send in a prescription today if needed.     I went ahead and sent in a higher dose of januvia 100mg.  She doesn't have to fill it if she opts for lantus.      She needs to increase her water intake.    If blood sugars remain >500, and/or she has nausea, vomiting, abdominal pain, or any other new symptoms, she should go to the ER.

## 2018-04-26 NOTE — TELEPHONE ENCOUNTER
Dr. William :  Patient is willing to go with the lantus insulin plan and pharmacist said that they can instruct her in injection.(she has given herself epipen)   She wants option to get the BS down in better range.    Reviewed the signs to watch for with hyperglycemia. She understands to go to ED if worsening sx.( No Nausea,vomiting or abdominal pain.)   Send RX for lantus to pharmacy.  Monica Gonzalez RN

## 2018-04-26 NOTE — TELEPHONE ENCOUNTER
Dr. William review:  Prednisone really affecting her BS as it was 545 last night possibly higher she thought.  Any med adjustments?  Taking all diabetes       COPD sx about the same although the cough is loosening up.  Still SOB when walking from front to back of house.  Taking her ABX Doxy.      Her belly button is slowly improving but she will consider using the other ointment you ordered.    Appt recheck set up for Monday with you.  We can do this sooner if needed.   Monica Gonzalez RN

## 2018-04-26 NOTE — TELEPHONE ENCOUNTER
Patient informed of plan. Her sister will bring her to pharmacy to get the lantus. She will take an extra glimeperide today with meal.  Monica Gonzalez RN

## 2018-04-30 ENCOUNTER — RADIANT APPOINTMENT (OUTPATIENT)
Dept: GENERAL RADIOLOGY | Facility: CLINIC | Age: 73
End: 2018-04-30
Attending: FAMILY MEDICINE
Payer: MEDICARE

## 2018-04-30 ENCOUNTER — OFFICE VISIT (OUTPATIENT)
Dept: FAMILY MEDICINE | Facility: CLINIC | Age: 73
End: 2018-04-30
Payer: MEDICARE

## 2018-04-30 VITALS
HEART RATE: 74 BPM | BODY MASS INDEX: 48.49 KG/M2 | DIASTOLIC BLOOD PRESSURE: 77 MMHG | WEIGHT: 293 LBS | OXYGEN SATURATION: 95 % | TEMPERATURE: 97.8 F | RESPIRATION RATE: 16 BRPM | SYSTOLIC BLOOD PRESSURE: 155 MMHG

## 2018-04-30 DIAGNOSIS — J44.1 CHRONIC OBSTRUCTIVE PULMONARY DISEASE WITH ACUTE EXACERBATION (H): ICD-10-CM

## 2018-04-30 DIAGNOSIS — R06.02 SOB (SHORTNESS OF BREATH): Primary | ICD-10-CM

## 2018-04-30 DIAGNOSIS — I50.30 (HFPEF) HEART FAILURE WITH PRESERVED EJECTION FRACTION (H): ICD-10-CM

## 2018-04-30 DIAGNOSIS — L01.00 IMPETIGO: ICD-10-CM

## 2018-04-30 DIAGNOSIS — Z79.899 POLYPHARMACY: ICD-10-CM

## 2018-04-30 DIAGNOSIS — D72.829 LEUKOCYTOSIS, UNSPECIFIED TYPE: ICD-10-CM

## 2018-04-30 DIAGNOSIS — R06.02 SOB (SHORTNESS OF BREATH): ICD-10-CM

## 2018-04-30 LAB
BASOPHILS # BLD AUTO: 0 10E9/L (ref 0–0.2)
BASOPHILS NFR BLD AUTO: 0.1 %
DIFFERENTIAL METHOD BLD: ABNORMAL
EOSINOPHIL # BLD AUTO: 0.1 10E9/L (ref 0–0.7)
EOSINOPHIL NFR BLD AUTO: 0.7 %
ERYTHROCYTE [DISTWIDTH] IN BLOOD BY AUTOMATED COUNT: 13.5 % (ref 10–15)
HCT VFR BLD AUTO: 43.9 % (ref 35–47)
HGB BLD-MCNC: 14.5 G/DL (ref 11.7–15.7)
LYMPHOCYTES # BLD AUTO: 1.4 10E9/L (ref 0.8–5.3)
LYMPHOCYTES NFR BLD AUTO: 10.5 %
MCH RBC QN AUTO: 27.7 PG (ref 26.5–33)
MCHC RBC AUTO-ENTMCNC: 33 G/DL (ref 31.5–36.5)
MCV RBC AUTO: 84 FL (ref 78–100)
MONOCYTES # BLD AUTO: 0.7 10E9/L (ref 0–1.3)
MONOCYTES NFR BLD AUTO: 4.8 %
NEUTROPHILS # BLD AUTO: 11.6 10E9/L (ref 1.6–8.3)
NEUTROPHILS NFR BLD AUTO: 83.9 %
NT-PROBNP SERPL-MCNC: 169 PG/ML (ref 0–125)
PLATELET # BLD AUTO: 126 10E9/L (ref 150–450)
RBC # BLD AUTO: 5.23 10E12/L (ref 3.8–5.2)
WBC # BLD AUTO: 13.8 10E9/L (ref 4–11)

## 2018-04-30 PROCEDURE — 71046 X-RAY EXAM CHEST 2 VIEWS: CPT

## 2018-04-30 PROCEDURE — 36415 COLL VENOUS BLD VENIPUNCTURE: CPT | Performed by: FAMILY MEDICINE

## 2018-04-30 PROCEDURE — 80048 BASIC METABOLIC PNL TOTAL CA: CPT | Performed by: FAMILY MEDICINE

## 2018-04-30 PROCEDURE — 83880 ASSAY OF NATRIURETIC PEPTIDE: CPT | Performed by: FAMILY MEDICINE

## 2018-04-30 PROCEDURE — 99214 OFFICE O/P EST MOD 30 MIN: CPT | Performed by: FAMILY MEDICINE

## 2018-04-30 PROCEDURE — 85025 COMPLETE CBC W/AUTO DIFF WBC: CPT | Performed by: FAMILY MEDICINE

## 2018-04-30 RX ORDER — FLURBIPROFEN SODIUM 0.3 MG/ML
SOLUTION/ DROPS OPHTHALMIC
Qty: 100 EACH | Refills: 0 | Status: SHIPPED | OUTPATIENT
Start: 2018-04-30 | End: 2019-01-02

## 2018-04-30 NOTE — MR AVS SNAPSHOT
After Visit Summary   4/30/2018    Mariel Ribeiro    MRN: [id number]           Patient Information     Date Of Birth          1945        Visit Information        Provider Department      4/30/2018 1:20 PM Rafaela William MD [facility]        Today's Diagnoses     SOB (shortness of breath)    -  1      Care Instructions    Take your diabetes medications as you usually do.  Lantus is at bedtime, whenever that may be.    Lasix: in the morning, then 6 hours later--it will work better that way, plus you won't be up all night, hopefully.     We will get you onto Karen's schedule.      See me back when you are done with the prednisone.  In the meantime, I will get your labs and x-rays we are doing today and adjust medications if we need to.  It's possible that we need to go back to the higher dose (2 tabs) of lasix twice daily, but let's see how the tests come out.           Follow-ups after your visit        Additional Services     MED THERAPY MANAGE REFERRAL       Reason for referral:  Polypharmacy, organizing medications.    This service is designed to help you get the most from your medications.  A specially trained pharmacist will work closely with you and your doctors  to solve any problems related to your medications and to help you get the   best results from taking them.      Please bring all of your prescription and non- prescription medications (such   as vitamins, over-the-counter medications, and herbals) or a detailed medication list to your appointment.  If you have a glucose meter or other home monitoring information, please bring this in (ie, blood glucose log, blood pressure log, pain log, etc.).    To schedule an appointment you may either schedule at the clinic or call: **[facility] Scheduling (numerous locations) [phone]   [email or web address]                  Your next 10  appointments already scheduled     Jun 27, 2018  4:40 PM CDT   (Arrive by 4:25 PM)   RETURN HEART FAILURE with Stephanie Wolf MD   [facility] ([facility])    [address] [place] [address]  [address] [place] [id number] [phone]              Future tests that were ordered for you today     Open Future Orders        Priority Expected Expires Ordered    XR Chest 2 Views Routine 4/30/2018 4/30/2019 4/30/2018            Who to contact     If you have questions or need follow up information about today's clinic visit or your schedule please contact [facility] directly at [phone].  Normal or non-critical lab and imaging results will be communicated to you by MyChart, letter or phone within 4 business days after the clinic has received the results. If you do not hear from us within 7 days, please contact the clinic through [facility] or phone. If you have a critical or abnormal lab result, we will notify you by phone as soon as possible.  Submit refill requests through [employer] or call your pharmacy and they will forward the refill request to us. Please allow 3 business days for your refill to be completed.          Additional Information About Your Visit        MyChart Information     [employer] gives you secure access to your electronic health record. If you see a primary care provider, you can also send messages to your care team and make appointments. If you have questions, please call your primary care clinic.  If you do not have a primary care provider, please call [phone] and they will assist you.        Care EveryWhere ID     This is your Care EveryWhere ID. This could be used by other organizations to access your [facility] medical records  [id number]        Your Vitals Were     Pulse Temperature Respirations Pulse Oximetry BMI (Body Mass Index)       74 97.8  F (36.6  C) (Oral) 16 95% 48.49 kg/m2        Blood Pressure from Last 3 Encounters:    04/30/18 155/77   04/19/18 139/82   03/26/18 162/74    Weight from Last 3 Encounters:   04/30/18 305 lb (138.3 kg)   03/26/18 305 lb (138.3 kg)   03/19/18 (!) 305 lb (138.3 kg)              We Performed the Following     Basic metabolic panel     BNP-N terminal pro     CBC with platelets and differential     MED THERAPY MANAGE REFERRAL        Primary Care Provider Office Phone # Fax #    Rafaela William [phone] [address] [place] [address]        Equal Access to Services     [facility] : Hadii von santillan hadpapio [place], waaxda lukelley, qaybta kaalmada adedustin, gopal pena . So [facility] [phone].    ATENCIÓN: Si habla español, tiene a gillespie disposición servicios gratuitos de asistencia lingüística. [facility] [phone].    We comply with applicable federal civil rights laws and [place] laws. We do not discriminate on the basis of race, color, national origin, age, disability, sex, sexual orientation, or gender identity.            Thank you!     Thank you for choosing [facility]  for your care. Our goal is always to provide you with excellent care. Hearing back from our patients is one way we can continue to improve our services. Please take a few minutes to complete the written survey that you may receive in the mail after your visit with us. Thank you!             Your Updated Medication List - Protect others around you: Learn how to safely use, store and throw away your medicines at www.disposemymeds.org.          This list is accurate as of 4/30/18  2:03 PM.  Always use your most recent med list.                   Brand Name Dispense Instructions for use Diagnosis    albuterol 108 (90 Base) MCG/ACT Inhaler    PROAIR HFA    1 Inhaler    INHALE 1 TO 2 PUFFS EVERY 4 TO 6 HOURS AS NEEDED    Chronic obstructive pulmonary disease with acute exacerbation (H)       aspirin 325 MG EC tablet     30 tablet    Take 1 tablet by  mouth daily.        atorvastatin 40 MG tablet    LIPITOR    90 tablet    TAKE 1 TABLET(40 MG) BY MOUTH DAILY    Hyperlipidemia with target LDL less than 70       B-D U/F 31G X 5 MM   Generic drug:  insulin pen needle     100 each    USE FOR INSULIN INJECTION    Type 2 diabetes mellitus with diabetic polyneuropathy, without long-term current use of insulin (H)       blood glucose monitoring test strip    no brand specified    200 each    1 strip by In Vitro route 2 times daily Strips per patient's preference    Type 2 diabetes mellitus with diabetic polyneuropathy, without long-term current use of insulin (H)       cyclobenzaprine 10 MG tablet    FLEXERIL    90 tablet    TAKE 1 TABLET(10 MG) BY MOUTH THREE TIMES DAILY AS NEEDED FOR MUSCLE SPASMS    Acute right-sided thoracic back pain       cycloSPORINE 0.05 % ophthalmic emulsion    RESTASIS    180 each    Place 1 drop into both eyes 2 times daily    Keratitis sicca, bilateral       doxycycline 100 MG capsule    VIBRAMYCIN    20 capsule    Take 1 capsule (100 mg) by mouth 2 times daily    COPD exacerbation (H)       EPINEPHrine 0.3 MG/0.3ML injection 2-pack    EPIPEN/ADRENACLICK/or ANY BX GENERIC EQUIV    0.6 mL    Inject 0.3 mLs (0.3 mg) into the muscle once as needed for anaphylaxis    Allergic reaction, subsequent encounter       escitalopram 20 MG tablet    LEXAPRO    90 tablet    Take 1 tablet (20 mg) by mouth every morning    Major depressive disorder, single episode, in partial remission (H)       folic acid 800 MCG Tabs      Take 800 mcg by mouth daily.        furosemide 40 MG tablet    LASIX    120 tablet    TAKE 2 TABLETS(80 MG) BY MOUTH TWICE DAILY    Chronic diastolic heart failure (H)       glimepiride 4 MG tablet    AMARYL    180 tablet    TAKE 1 TABLET BY MOUTH TWICE DAILY(WITH MEALS)    Type 2 diabetes mellitus with chronic kidney disease, without long-term current use of insulin, unspecified CKD stage (H)       GUAIFENESIN  MG Tbcr     30     Take 600 mg by mouth twice daily        insulin glargine 100 UNIT/ML injection    LANTUS SOLOSTAR    15 mL    Inject 5 Units Subcutaneous At Bedtime    Type 2 diabetes mellitus with chronic kidney disease, without long-term current use of insulin, unspecified CKD stage (H)       levothyroxine 150 MCG tablet    SYNTHROID/LEVOTHROID    90 tablet    Take 1 tablet (150 mcg) by mouth daily    Hypothyroidism, unspecified type       LIFESCAN FINEPOINT LANCETS Misc     100 each    Use to test blood sugars 2 times daily or as directed.    Type 2 diabetes mellitus with diabetic polyneuropathy, without long-term current use of insulin (H)       LYRICA 100 MG capsule   Generic drug:  pregabalin     180 capsule    TAKE ONE CAPSULE BY MOUTH TWICE DAILY    Myalgia       metaxalone 800 MG tablet    SKELAXIN    30 tablet    Take 1 tablet (800 mg) by mouth 3 times daily as needed for moderate pain    Muscle spasm       * metFORMIN 500 MG 24 hr tablet    GLUCOPHAGE-XR     Take 500 mg by mouth 2 times daily (with meals) Reported on 5/23/2017        * metFORMIN 500 MG 24 hr tablet    GLUCOPHAGE-XR    360 tablet    TAKE 2 TABLETS BY MOUTH AFTER BREAKFAST AND SUPPER    Type 2 diabetes mellitus with diabetic polyneuropathy, without long-term current use of insulin (H)       Miconazole Nitrate 2 % Powd     100 g    Apply to rash on chest three times daily.  Continue for one week after the rash has resolved.    Intertrigo       niacin 500 MG CR tablet    NIASPAN    180 tablet    TAKE 1 TABLET(500 MG) BY MOUTH TWICE DAILY    Hyperlipidemia with target LDL less than 70       nitroGLYcerin 0.4 MG sublingual tablet    NITROSTAT    25 tablet    Place 1 tablet (0.4 mg) under the tongue every 5 minutes as needed for chest pain As needed for chest pain.    Coronary atherosclerosis of unspecified type of vessel, native or graft       ONE TOUCH ULTRA (DEVICES) Misc     1    test blood sugar BID    Type II or unspecified type diabetes mellitus without  mention of complication, not stated as uncontrolled       potassium chloride SA 10 MEQ CR tablet    K-DUR/KLOR-CON M    120 tablet    TAKE 2 TABLETS BY MOUTH TWICE DAILY    Hypokalemia, Chronic diastolic heart failure (H)       predniSONE 20 MG tablet    DELTASONE    26 tablet    Take 3 tabs by mouth daily for 4 days, then 2 tabs daily for 4 days, then 1 tab daily for 4 days, then 1/2 tab daily for 4 days, then stop.    COPD exacerbation (H)       prochlorperazine 5 MG tablet    COMPAZINE    10 tablet    Take 1-2 tablets (5-10 mg) by mouth every 6 hours as needed (Headache)        senna-docusate 8.6-50 MG per tablet    SENOKOT-S;PERICOLACE    60 tablet    Take 1-2 tablets by mouth 2 times daily as needed for constipation    Constipation       sitagliptin 50 MG tablet    JANUVIA    90 tablet    Take 1 tablet (50 mg) by mouth daily    Type 2 diabetes mellitus with chronic kidney disease, without long-term current use of insulin, unspecified CKD stage (H)       tiZANidine 4 MG tablet    ZANAFLEX    90 tablet    Take 1-2 tablets (4-8 mg) by mouth 3 times daily as needed    Muscle spasm       traZODone 50 MG tablet    DESYREL    90 tablet    Take 3 tablets (150 mg) by mouth At Bedtime    Insomnia, unspecified type       ULORIC 40 MG Tabs tablet   Generic drug:  febuxostat     90 tablet    TAKE 1 TABLET(40 MG) BY MOUTH EVERY EVENING    Hyperuricemia       vitamin D3 5000 UNIT/ML Liqd      Take 10,000 Units by mouth daily        * Notice:  This list has 2 medication(s) that are the same as other medications prescribed for you. Read the directions carefully, and ask your doctor or other care provider to review them with you.

## 2018-04-30 NOTE — Clinical Note
I'm sending her back to you for a tune up.  I started lantus due to hyperglycemia with the prednisone.  I'm fine with keeping it if she would like; we had steered clear because of weight/edema but she seems to like it.  Could maybe get rid of her metformin/glim, particularly with renal fxn though that has been ok lately (rechecking at this visit).   Mostly she'd like help with when to take which pills.  Thanks!  Rafaela

## 2018-04-30 NOTE — PROGRESS NOTES
"HPI      ROS      Physical Exam      SUBJECTIVE:   Mariel Ribeiro is a 72 year old female who presents to clinic today for the following health issues:    Patient is for follow up of COPD exacerbation:  She is taking the prednisone taper as directed and doxycycline.  Over the past 2-3 days, \"things have at least started to loosen up,\" and she is coughing up a lot of yellow sputum.  However, she is still quite short of breath with exertion and with lying down (chokes on phlegm).  She has not noticed if her lower extremity edema is worse.  She notes that she decided to cut her lasix in half (40mg BID rather than 80mg BID), because it keeps her up at night urinating; she takes the second dose at bedtime.    She started lantus 5u at bedtime as recommended (see TE from last week) after her blood sugars were >500.  She says she hasn't been taking her metformin this past week; it isn't clear why. She also says she tends to take all of her diabetes pills all at once in the morning.  She thinks she might need to see someone about getting her pills organized and timed appropriately.  Her blood sugars are improving since adding the lantus; she is not have any hypoglycemia; fasting was around 170 today.     The crusting/oozing from her umbilicus has improved with topical antibiotic.           Problem list and histories reviewed & adjusted, as indicated.  Additional history: as documented    Patient Active Problem List   Diagnosis     Hypothyroidism      HX PHYSICAL ABUSE     Coronary atherosclerosis     Myalgia and myositis     Insomnia     Migraine     Chronic airway obstruction/ COPD     Mononeuritis     FAMILY HX-THROMBOSIS     Obstructive sleep apnea     Vitamin D deficiency     Hyperuricemia     Type 2 diabetes mellitus with diabetic chronic kidney disease (H)     H/O chronic kidney disease     Hypertension goal BP (blood pressure) < 130/80     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)     Hyperlipidemia with target LDL " less than 70     Chronic diastolic heart failure (H)     Intermediate coronary syndrome (H)     Osteoarthritis     Morbid obesity (H)     Chest pain     Advanced directives, counseling/discussion     Arthritis of knee     Total knee replacement status     Constipation     Hypokalemia     Transient cerebral ischemia     Spells     Pain in joint of left shoulder     History of thyroid cancer     Status post coronary angiogram     Cervicalgia     Cholelithiasis     Pancreatitis, acute     Postoperative state     Fatty liver disease, nonalcoholic     Hepatomegaly     Type 2 diabetes mellitus with diabetic polyneuropathy, without long-term current use of insulin (H)     Past Surgical History:   Procedure Laterality Date     ANGIOGRAPHY  8/11    with stent placement X2 mid- and proximal LAD     ARTHROPLASTY KNEE  1/28/2014    Procedure: ARTHROPLASTY KNEE;  ARTHROPLASTY KNEE -RIGHT TOTAL  ;  Surgeon: Victor Manuel Wolff MD;  Location: [facility]     C TOTAL KNEE ARTHROPLASTY  7/30/04    Knee Replacement, Total right and left     CATARACT IOL, RT/LT Bilateral      COLONOSCOPY       DILATION AND CURETTAGE, OPERATIVE HYSTEROSCOPY WITH MORCELLATOR, COMBINED N/A 11/1/2016    Procedure: COMBINED DILATION AND CURETTAGE, OPERATIVE HYSTEROSCOPY WITH MORCELLATOR;  Surgeon: Stacey Arnold DO;  Location: [facility] INTRODUCE NEEDLE/CATH, EXTREMITY ARTERY  1999,2002,2004    Angiocardiogram     HC KNEE SCOPE, DIAGNOSTIC  1990's    Arthroscopy, Knee, bilateral     LAPAROSCOPIC CHOLECYSTECTOMY N/A 8/11/2017    Procedure: LAPAROSCOPIC CHOLECYSTECTOMY;  Laparoscopic Cholecystectomy   *Latex Allergy*, Anesthesia Block;  Surgeon: Jeffrey Roberson MD;  Location: [facility] OR     PHACOEMULSIFICATION CLEAR CORNEA WITH STANDARD INTRAOCULAR LENS IMPLANT Right 12/29/2014    Procedure: PHACOEMULSIFICATION CLEAR CORNEA WITH STANDARD INTRAOCULAR LENS IMPLANT;  Surgeon: Smith Quintana MD;  Location: [facility]     PHACOEMULSIFICATION CLEAR CORNEA  WITH TORIC INTRAOCULAR LENS IMPLANT Left 1/12/2015    Procedure: PHACOEMULSIFICATION CLEAR CORNEA WITH TORIC INTRAOCULAR LENS IMPLANT;  Surgeon: Smith Quintana MD;  Location: [facility]     SURGICAL HISTORY OF -   1963    dentures     SURGICAL HISTORY OF -   1985    thyroidectomy     SURGICAL HISTORY OF -   1998    right thumb surgery     SURGICAL HISTORY OF -   2001    right breast biopsy (benign)     SURGICAL HISTORY OF -   04/2004    left shoulder surgery - rotator cuff     SURGICAL HISTORY OF -   4/09    left thumb surgery     THYROIDECTOMY         Social History   Substance Use Topics     Smoking status: Former Smoker     Packs/day: 0.00     Years: 27.00     Types: Cigarettes     Quit date: 7/1/1989     Smokeless tobacco: Never Used     Alcohol use No     Family History   Problem Relation Age of Onset     C.A.D. Mother      DIABETES Mother      Hypertension Mother      Blood Disease Mother      multiple episodes of thrombosis     Circulatory Mother      DVT X 2; ocular clot; cerebral; carotid artery stenosis     Glaucoma Mother      Macular Degeneration Mother      C.A.D. Father      Hypertension Father      CEREBROVASCULAR DISEASE Father      Alcohol/Drug Father      etoh     CANCER Brother      liver,pancreas, brain     Cardiovascular Sister      Hypertension Sister      Hypertension Brother      Alcohol/Drug Sister      etoh     Alcohol/Drug Brother      drug     DIABETES Sister      younger     C.A.D. Sister      CABG age 65     C.A.D. Brother      CABG age 42     C.A.D. Sister      stents age 58     C.A.D. Brother      Genitourinary Problems Sister      kidney disease         Current Outpatient Prescriptions   Medication Sig Dispense Refill     albuterol (ALBUTEROL) 108 (90 BASE) MCG/ACT Inhaler INHALE 1 TO 2 PUFFS EVERY 4 TO 6 HOURS AS NEEDED 1 Inhaler PRN     aspirin  MG tablet Take 1 tablet by mouth daily. 30 tablet 0     atorvastatin (LIPITOR) 40 MG tablet TAKE 1 TABLET(40 MG) BY MOUTH DAILY  90 tablet 3     B-D U/F insulin pen needle USE FOR INSULIN INJECTION 100 each 0     blood glucose monitoring (NO BRAND SPECIFIED) test strip 1 strip by In Vitro route 2 times daily Strips per patient's preference 200 each 3     Cholecalciferol (VITAMIN D3) 5000 UNIT/ML LIQD Take 10,000 Units by mouth daily        cyclobenzaprine (FLEXERIL) 10 MG tablet TAKE 1 TABLET(10 MG) BY MOUTH THREE TIMES DAILY AS NEEDED FOR MUSCLE SPASMS 90 tablet 3     cycloSPORINE (RESTASIS) 0.05 % ophthalmic emulsion Place 1 drop into both eyes 2 times daily 180 each 3     doxycycline (VIBRAMYCIN) 100 MG capsule Take 1 capsule (100 mg) by mouth 2 times daily 20 capsule 0     EPINEPHrine (EPIPEN/ADRENACLICK/OR ANY BX GENERIC EQUIV) 0.3 MG/0.3ML injection 2-pack Inject 0.3 mLs (0.3 mg) into the muscle once as needed for anaphylaxis 0.6 mL 0     escitalopram (LEXAPRO) 20 MG tablet Take 1 tablet (20 mg) by mouth every morning 90 tablet 3     Folic Acid 800 MCG TABS Take 800 mcg by mouth daily.       furosemide (LASIX) 40 MG tablet TAKE 2 TABLETS(80 MG) BY MOUTH TWICE DAILY 120 tablet 3     glimepiride (AMARYL) 4 MG tablet TAKE 1 TABLET BY MOUTH TWICE DAILY(WITH MEALS) 180 tablet 3     GUAIFENESIN  MG OR TBCR Take 600 mg by mouth twice daily 30 0     insulin glargine (LANTUS SOLOSTAR) 100 UNIT/ML injection Inject 5 Units Subcutaneous At Bedtime 15 mL 1     levothyroxine (SYNTHROID/LEVOTHROID) 150 MCG tablet Take 1 tablet (150 mcg) by mouth daily 90 tablet 3     SurvataCAN FINEPOINT LANCETS MISC Use to test blood sugars 2 times daily or as directed. 100 each prn     LYRICA 100 MG capsule TAKE ONE CAPSULE BY MOUTH TWICE DAILY 180 capsule 3     metaxalone (SKELAXIN) 800 MG tablet Take 1 tablet (800 mg) by mouth 3 times daily as needed for moderate pain 30 tablet 1     metFORMIN (GLUCOPHAGE-XR) 500 MG 24 hr tablet Take 500 mg by mouth 2 times daily (with meals) Reported on 5/23/2017       metFORMIN (GLUCOPHAGE-XR) 500 MG 24 hr tablet TAKE 2  TABLETS BY MOUTH AFTER BREAKFAST AND SUPPER 360 tablet 0     Miconazole Nitrate 2 % POWD Apply to rash on chest three times daily.  Continue for one week after the rash has resolved. 100 g 3     niacin (NIASPAN) 500 MG CR tablet TAKE 1 TABLET(500 MG) BY MOUTH TWICE DAILY 180 tablet 1     nitroglycerin (NITROSTAT) 0.4 MG SL tablet Place 1 tablet (0.4 mg) under the tongue every 5 minutes as needed for chest pain As needed for chest pain. 25 tablet PRN     ONE TOUCH ULTRA (DEVICES) MISC test blood sugar BID 1 0     potassium chloride SA (K-DUR/KLOR-CON M) 10 MEQ CR tablet TAKE 2 TABLETS BY MOUTH TWICE DAILY 120 tablet 3     predniSONE (DELTASONE) 20 MG tablet Take 3 tabs by mouth daily for 4 days, then 2 tabs daily for 4 days, then 1 tab daily for 4 days, then 1/2 tab daily for 4 days, then stop. 26 tablet 0     prochlorperazine (COMPAZINE) 5 MG tablet Take 1-2 tablets (5-10 mg) by mouth every 6 hours as needed (Headache) 10 tablet 0     senna-docusate (SENOKOT-S;PERICOLACE) 8.6-50 MG per tablet Take 1-2 tablets by mouth 2 times daily as needed for constipation 60 tablet 0     sitagliptin (JANUVIA) 50 MG tablet Take 1 tablet (50 mg) by mouth daily 90 tablet 3     tiZANidine (ZANAFLEX) 4 MG tablet Take 1-2 tablets (4-8 mg) by mouth 3 times daily as needed 90 tablet 0     traZODone (DESYREL) 50 MG tablet Take 3 tablets (150 mg) by mouth At Bedtime 90 tablet 7     ULORIC 40 MG TABS tablet TAKE 1 TABLET(40 MG) BY MOUTH EVERY EVENING 90 tablet 1     Allergies   Allergen Reactions     Contrast Dye Anaphylaxis     Fish Allergy Anaphylaxis     Iodine Anaphylaxis     Oxycodone Other (See Comments)     Severe suicidal tendencies on this medication     Tree Nuts [Nuts] Anaphylaxis     Tree nuts only (peanuts ok)     Albuterol Other (See Comments)     Sandrita-oral erythema     Bactrim      Increased uric acid     Betadine [Povidone Iodine] Hives, Swelling and Difficulty breathing     Betadine     Combivent      Rash     Dulaglutide  Other (See Comments)     Hx . Of thyroid cancer.     Lisinopril Other (See Comments)     Scr/grf severely reduced.      Penicillins Rash     Allopurinol Rash     Latex Rash     Wool Fiber Rash       Reviewed and updated as needed this visit by clinical staff       Reviewed and updated as needed this visit by Provider         ROS:  Constitutional, HEENT, cardiovascular, pulmonary, gi and gu systems are negative, except as otherwise noted.    OBJECTIVE:     /77  Pulse 74  Temp 97.8  F (36.6  C) (Oral)  Resp 16  Wt 305 lb (138.3 kg)  SpO2 95%  BMI 48.49 kg/m2  Body mass index is 48.49 kg/(m^2).  GENERAL APPEARANCEalert and no distress, appears fatigued but not toxic  EYES: Eyes grossly normal to inspection, PERRL and conjunctivae and sclerae normal  RESP: lungs clear to auscultation - no rales, rhonchi or wheezes,,fairly good air movement, does not appear dyspneic at rest.  CV: regular rates and rhythm, normal S1 S2, no S3 or S4 and no murmur, click or rub  MS: trace BLE edema.    SKIN: no crusting, erythema or drainage at umbilicus.    Diagnostic Test Results:  Results for orders placed or performed in visit on 04/30/18 (from the past 24 hour(s))   CBC with platelets and differential   Result Value Ref Range    WBC 13.8 (H) 4.0 - 11.0 10e9/L    RBC Count 5.23 (H) 3.8 - 5.2 10e12/L    Hemoglobin 14.5 11.7 - 15.7 g/dL    Hematocrit 43.9 35.0 - 47.0 %    MCV 84 78 - 100 fl    MCH 27.7 26.5 - 33.0 pg    MCHC 33.0 31.5 - 36.5 g/dL    RDW 13.5 10.0 - 15.0 %    Platelet Count 126 (L) 150 - 450 10e9/L    Diff Method Automated Method     % Neutrophils 83.9 %    % Lymphocytes 10.5 %    % Monocytes 4.8 %    % Eosinophils 0.7 %    % Basophils 0.1 %    Absolute Neutrophil 11.6 (H) 1.6 - 8.3 10e9/L    Absolute Lymphocytes 1.4 0.8 - 5.3 10e9/L    Absolute Monocytes 0.7 0.0 - 1.3 10e9/L    Absolute Eosinophils 0.1 0.0 - 0.7 10e9/L    Absolute Basophils 0.0 0.0 - 0.2 10e9/L     *Note: Due to a large number of results  and/or encounters for the requested time period, some results have not been displayed. A complete set of results can be found in Results Review.       ASSESSMENT/PLAN:   71 yo F with DM2, HTN, CAD, BELEN, and HRpEF presents for f/u of COPD; chest congestion is loosening up, but still TOWNSEND.     1.  COPD exacerbation: her respiratory exam is much improved; however she is symptomatically still short of breath with exertion and lying down.  I will check plain x-ray for possible aspiration pneumonia, volume overload.  I will check BNP: I wonder if heart failure exacerbation is concurrent; however, her weight is stable, and her BLE edema actually looks pretty good today, despite cutting her lasix in half.  Continue prednisone taper for now, complete antibiotic course.  2.   HFpEF: as above.  Also, discussed that she should take her second dose of lasix 6 hours after her first dose (which is at 10 or 11am, so second dose at 4-5pm rather than at bedtime at 10-11pm) to decrease nocturia issue and to help the medication be more effective.  She does have a cardiology appt coming up next month.  Will f/u on CXR and BNP results.   3.  DM2: hyperglycemia secondary to steroids, improved with addition of lantus.  I'm not sure why she has stopped her metformin.  Monitor for hypoglycemia as she weans the steroids.  I will have her also see MTM again (it has been a while) to check in on this and also to help her manage timing and organization of her medications.    4.  Polypharmacy: appreciate MTM referral.   5.  Leukocytosis; most likely demargination with prednisone.    6.  Impetigo of umbilicus: this looks to be clearing up nicely with topical antibiotic.      Rafaela William MD  [facility]

## 2018-04-30 NOTE — PATIENT INSTRUCTIONS
Take your diabetes medications as you usually do.  Lantus is at bedtime, whenever that may be.    Lasix: in the morning, then 6 hours later--it will work better that way, plus you won't be up all night, hopefully.     We will get you onto Karen's schedule.      See me back when you are done with the prednisone.  In the meantime, I will get your labs and x-rays we are doing today and adjust medications if we need to.  It's possible that we need to go back to the higher dose (2 tabs) of lasix twice daily, but let's see how the tests come out.

## 2018-04-30 NOTE — TELEPHONE ENCOUNTER
LOV: 4/19/2018    Routing refill request to provider for review/approval because:  Drug not active on patient's medication list        Thanks! Milagro Sarah RN

## 2018-05-01 ENCOUNTER — TELEPHONE (OUTPATIENT)
Dept: FAMILY MEDICINE | Facility: CLINIC | Age: 73
End: 2018-05-01

## 2018-05-01 DIAGNOSIS — J44.1 CHRONIC OBSTRUCTIVE PULMONARY DISEASE WITH ACUTE EXACERBATION (H): Primary | ICD-10-CM

## 2018-05-01 LAB
ANION GAP SERPL CALCULATED.3IONS-SCNC: 10 MMOL/L (ref 3–14)
BUN SERPL-MCNC: 31 MG/DL (ref 7–30)
CALCIUM SERPL-MCNC: 9.6 MG/DL (ref 8.5–10.1)
CHLORIDE SERPL-SCNC: 103 MMOL/L (ref 94–109)
CO2 SERPL-SCNC: 25 MMOL/L (ref 20–32)
CREAT SERPL-MCNC: 1.47 MG/DL (ref 0.52–1.04)
GFR SERPL CREATININE-BSD FRML MDRD: 35 ML/MIN/1.7M2
GLUCOSE SERPL-MCNC: 252 MG/DL (ref 70–99)
POTASSIUM SERPL-SCNC: 3.7 MMOL/L (ref 3.4–5.3)
SODIUM SERPL-SCNC: 138 MMOL/L (ref 133–144)

## 2018-05-01 NOTE — TELEPHONE ENCOUNTER
Please call Mariel.  I saw her yesterday.   -overall labs are ok.  -kidney function is a little worse, make sure to stay well-hydrated.  -The BNP was ok, so she can just keep doing lasix 40mg bid as she has been (she had decreased from 80mg to 40mg, and that is fine).    -she had stopped her metformin.  Since her kidneys are a little off, probably from dehydration, I'd like her to continue to hold the metformin.  It's ok if she took it this morning.  Continue januvia and glimepiride.   -keep going on prednisone taper.   -continue lantus 5u at bedtime.    -her chest x-ray was clear; when she is done with the course of doxycycline, then she can be done with antibiotics.  -keep using inhalers regularly. In 2007, Marta Martines documented that she could not tolerate Combivent.  This is an inhaler for COPD.  I'm wondering if Mariel recalls what the problem was?    Thank you.

## 2018-05-01 NOTE — TELEPHONE ENCOUNTER
"Dr. William review:  In regards to the Combivent question the patient does not really recall but she wonders if she got \"lightheaded\" however she is not sure.   Nurse attempted to find info about the combivent but so far did not.      Triage did relay all of the instructions and lab results to her and she took notes and will call if any questions or concerns.   Monica Gonzalez RN    "

## 2018-05-03 NOTE — TELEPHONE ENCOUNTER
Called [facility] to confirm they received appeal.  Was told they cancelled appeal and rep told me to re-submit appeal.  Faxed again to [phone]

## 2018-05-11 NOTE — TELEPHONE ENCOUNTER
MEDICATION APPEAL DENIED    Medication: metaxalone (SKELAXIN) 800 MG- appeal initiated     Denial Date: 5/11/2018    Denial Rational: Medicare allows us to cover a drug only when it is a Part D drug. Part D drug is one that is used for a medically accepted indication. Metaxalone is not a FDA approved for chronic  pain syndrome, back pain and fibromyalgia. This condition is not one of the uses for the drug listed.     Second Level Appeal Information:   Second level appeals will be managed by the clinic staff and provider. Please contact the [employer] Prior Authorization Team if additional information about the denial is needed.

## 2018-05-11 NOTE — TELEPHONE ENCOUNTER
Received letter from pt's insurance via mail. Appeal is denied.     Note from letter: Medicare allows us to cover a drug only when it is a Part D drug. Part D drug is one that is used for a medically accepted indication. Metaxalone is not a FDA approved for chronic  pain syndrome, back pain and fibromyalgia. This condition is not one of the uses for the drug listed.     Place denial letter in abstract.    Brionna Hernandez MA

## 2018-05-21 ENCOUNTER — TELEPHONE (OUTPATIENT)
Dept: FAMILY MEDICINE | Facility: CLINIC | Age: 73
End: 2018-05-21

## 2018-05-21 NOTE — TELEPHONE ENCOUNTER
Triaged call reporting a flareup of cough and congestion onset in last 24 hours.  Using inhaler albuterol.  She thought she could only use once daily.  Told her she can use it q 4-6 hours as needed.  Made appt tomorrow with SAVI Blount.  Told to go to  if sx are worsening .  She understands.  She has multiple risk factors. COPD. Pneumonia hx this year.  Plan:  Appt tomorrow.   tonight or ED if sx worsen.  Monica Gonzalez RN

## 2018-05-22 ENCOUNTER — OFFICE VISIT (OUTPATIENT)
Dept: FAMILY MEDICINE | Facility: CLINIC | Age: 73
End: 2018-05-22
Payer: MEDICARE

## 2018-05-22 VITALS
DIASTOLIC BLOOD PRESSURE: 74 MMHG | BODY MASS INDEX: 48.49 KG/M2 | WEIGHT: 293 LBS | SYSTOLIC BLOOD PRESSURE: 136 MMHG | RESPIRATION RATE: 20 BRPM | TEMPERATURE: 98.4 F | OXYGEN SATURATION: 96 % | HEART RATE: 89 BPM

## 2018-05-22 DIAGNOSIS — M54.6 ACUTE RIGHT-SIDED THORACIC BACK PAIN: ICD-10-CM

## 2018-05-22 DIAGNOSIS — J44.1 COPD EXACERBATION (H): Primary | ICD-10-CM

## 2018-05-22 DIAGNOSIS — I50.32 CHRONIC DIASTOLIC HEART FAILURE (H): ICD-10-CM

## 2018-05-22 DIAGNOSIS — I25.10 ATHEROSCLEROSIS OF NATIVE CORONARY ARTERY OF NATIVE HEART, ANGINA PRESENCE UNSPECIFIED: ICD-10-CM

## 2018-05-22 LAB
ANION GAP SERPL CALCULATED.3IONS-SCNC: 9 MMOL/L (ref 3–14)
BASOPHILS # BLD AUTO: 0 10E9/L (ref 0–0.2)
BASOPHILS NFR BLD AUTO: 0.3 %
BUN SERPL-MCNC: 14 MG/DL (ref 7–30)
CALCIUM SERPL-MCNC: 9.3 MG/DL (ref 8.5–10.1)
CHLORIDE SERPL-SCNC: 104 MMOL/L (ref 94–109)
CO2 SERPL-SCNC: 28 MMOL/L (ref 20–32)
CREAT SERPL-MCNC: 1.3 MG/DL (ref 0.52–1.04)
DIFFERENTIAL METHOD BLD: ABNORMAL
EOSINOPHIL # BLD AUTO: 0.1 10E9/L (ref 0–0.7)
EOSINOPHIL NFR BLD AUTO: 2.1 %
ERYTHROCYTE [DISTWIDTH] IN BLOOD BY AUTOMATED COUNT: 14.2 % (ref 10–15)
GFR SERPL CREATININE-BSD FRML MDRD: 40 ML/MIN/1.7M2
GLUCOSE SERPL-MCNC: 251 MG/DL (ref 70–99)
HCT VFR BLD AUTO: 40 % (ref 35–47)
HGB BLD-MCNC: 13.1 G/DL (ref 11.7–15.7)
LYMPHOCYTES # BLD AUTO: 1.3 10E9/L (ref 0.8–5.3)
LYMPHOCYTES NFR BLD AUTO: 19.5 %
MCH RBC QN AUTO: 28.4 PG (ref 26.5–33)
MCHC RBC AUTO-ENTMCNC: 32.8 G/DL (ref 31.5–36.5)
MCV RBC AUTO: 87 FL (ref 78–100)
MONOCYTES # BLD AUTO: 0.5 10E9/L (ref 0–1.3)
MONOCYTES NFR BLD AUTO: 7.4 %
NEUTROPHILS # BLD AUTO: 4.7 10E9/L (ref 1.6–8.3)
NEUTROPHILS NFR BLD AUTO: 70.7 %
NT-PROBNP SERPL-MCNC: 149 PG/ML (ref 0–125)
PLATELET # BLD AUTO: 140 10E9/L (ref 150–450)
POTASSIUM SERPL-SCNC: 3.8 MMOL/L (ref 3.4–5.3)
RBC # BLD AUTO: 4.62 10E12/L (ref 3.8–5.2)
SODIUM SERPL-SCNC: 141 MMOL/L (ref 133–144)
WBC # BLD AUTO: 6.6 10E9/L (ref 4–11)

## 2018-05-22 PROCEDURE — 83880 ASSAY OF NATRIURETIC PEPTIDE: CPT | Performed by: NURSE PRACTITIONER

## 2018-05-22 PROCEDURE — 85025 COMPLETE CBC W/AUTO DIFF WBC: CPT | Performed by: NURSE PRACTITIONER

## 2018-05-22 PROCEDURE — 36415 COLL VENOUS BLD VENIPUNCTURE: CPT | Performed by: NURSE PRACTITIONER

## 2018-05-22 PROCEDURE — 80048 BASIC METABOLIC PNL TOTAL CA: CPT | Performed by: NURSE PRACTITIONER

## 2018-05-22 PROCEDURE — 99214 OFFICE O/P EST MOD 30 MIN: CPT | Performed by: NURSE PRACTITIONER

## 2018-05-22 RX ORDER — PREDNISONE 20 MG/1
20 TABLET ORAL 2 TIMES DAILY
Qty: 10 TABLET | Refills: 0 | Status: SHIPPED | OUTPATIENT
Start: 2018-05-22 | End: 2018-06-29

## 2018-05-22 RX ORDER — NITROGLYCERIN 0.4 MG/1
0.4 TABLET SUBLINGUAL EVERY 5 MIN PRN
Qty: 25 TABLET | Status: ON HOLD | OUTPATIENT
Start: 2018-05-22 | End: 2018-07-22

## 2018-05-22 RX ORDER — CYCLOBENZAPRINE HCL 10 MG
TABLET ORAL
Qty: 90 TABLET | Refills: 3 | Status: ON HOLD | OUTPATIENT
Start: 2018-05-22 | End: 2018-07-22

## 2018-05-22 RX ORDER — DOXYCYCLINE 100 MG/1
100 CAPSULE ORAL 2 TIMES DAILY
Qty: 14 CAPSULE | Refills: 0 | Status: SHIPPED | OUTPATIENT
Start: 2018-05-22 | End: 2018-05-29

## 2018-05-22 NOTE — PATIENT INSTRUCTIONS
Try using the Combivent 4 times a day (if you develop a rash, stop using).   Take Prednisone daily in the morning for 5 days.  Take Doxycycline twice daily for 5 days.  See Dr. William if no improvement in 3 days otherwise follow up with her next week.

## 2018-05-22 NOTE — PROGRESS NOTES
SUBJECTIVE:   Mariel Ribeiro is a 72 year old female who presents to clinic today for the following health issues:  Pt reports only taking 20 MG of Lasix. Not taking Metformin.   Pt notes she is not taking Glimepiride 4 mg tablet: having issues with getting glimepiride. They will not fill until [place] [facility].    Was prescribed doxycycline (VIBRAMYCIN) 100 MG capsule and predniSONE (DELTASONE) 20 MG tablet on 4/19/2018 that helped for about 1 week and also       She is complaining of dyspnea (with exertion and at rest), purulent sputum for the past 1-2 weeks, symptoms are worsening.   Afebrile.   She is having headaches, some dizziness.  She does have sneezing and hoarseness.  She uses albuterol which helps for about an hour.     She has only been taking 20 mg of Lasix (prescribed 40 mg BID) because she has to urinate every 2 hours  She has an appointment with cardiology on 6/27.  No increase in LE edema.  Weighs herself at home, no increase in weight.     She was treated for a COPD exacerbation a month ago.  Kidney function was worsened at that time compared to her baseline.  She has been holding Metformin.    Blood sugars are improving.      She needs refills on Flexeril, which she uses for chronic back pain and NTG.            Problem list and histories reviewed & adjusted, as indicated.  Additional history: as documented        Reviewed and updated as needed this visit by clinical staff       Reviewed and updated as needed this visit by Provider         ROS:  CONSTITUTIONAL: NEGATIVE for fever, chills, change in weight  ENT/MOUTH: NEGATIVE for ear, mouth and throat problems  RESP:see HPI  CV: NEGATIVE for chest pain, palpitations or peripheral edema  GI: NEGATIVE for nausea, abdominal pain, heartburn, or change in bowel habits  MUSCULOSKELETAL: chronic back pain  ENDOCRINE: NEGATIVE for temperature intolerance, skin/hair changes    OBJECTIVE:     /74 (BP Location: Other (Comment))  Pulse 89  Temp 98.4  F  (36.9  C) (Oral)  Resp 20  Wt 305 lb (138.3 kg)  SpO2 96%  BMI 48.49 kg/m2  Body mass index is 48.49 kg/(m^2).  GENERAL: healthy, alert and no distress  HENT: ear canals and TM's normal, nose and mouth without ulcers or lesions  NECK: no adenopathy, no asymmetry, masses, or scars and thyroid normal to palpation  RESP: decreased breath sounds bilaterally, but no rales, rhonchi or wheezes  CV: regular rate and rhythm, normal S1 S2, no S3 or S4, no murmur, click or rub, no peripheral edema and peripheral pulses strong  MS: trace bilateral LE edema, although she did not wear her compression stockings today        ASSESSMENT/PLAN:             1. COPD exacerbation (H)  It is unclear if she really did have a rash from Combivent in the past, so will retry this.  Since she is having dyspnea and purulent sputum, has heart disease and is over 65, will treat for COPD exacerbation with Prednisone and doxycycline.  It is unclear if symptoms may also be related to heart failure, since she did reduce Lasix dose on her own, but is not having an increase in edema or weight gain.   Will have her follow up with her PCP in 3 days if no improvement, otherwise in one week.   - predniSONE (DELTASONE) 20 MG tablet; Take 1 tablet (20 mg) by mouth 2 times daily  Dispense: 10 tablet; Refill: 0  - doxycycline (VIBRAMYCIN) 100 MG capsule; Take 1 capsule (100 mg) by mouth 2 times daily for 7 days  Dispense: 14 capsule; Refill: 0  - Ipratropium-Albuterol (COMBIVENT RESPIMAT)  MCG/ACT inhaler; Inhale 1 puff into the lungs 4 times daily Not to exceed 6 doses per day.  Dispense: 1 Inhaler; Refill: 1  - CBC with platelets differential    2. Chronic diastolic heart failure (H)  Will check bnp, recheck kidney function.  Appointment is scheduled with cardiology.   - BNP-N terminal pro  - Basic metabolic panel    3. Acute right-sided thoracic back pain  Refills given.   - cyclobenzaprine (FLEXERIL) 10 MG tablet; TAKE 1 TABLET(10 MG) BY MOUTH  THREE TIMES DAILY AS NEEDED FOR MUSCLE SPASMS  Dispense: 90 tablet; Refill: 3    4. Atherosclerosis of native coronary artery of native heart, angina presence unspecified  Refills given.   - nitroGLYcerin (NITROSTAT) 0.4 MG sublingual tablet; Place 1 tablet (0.4 mg) under the tongue every 5 minutes as needed for chest pain As needed for chest pain.  Dispense: 25 tablet; Refill: PRN        Rosina Blount NP  [facility]

## 2018-05-22 NOTE — NURSING NOTE
Pt notes she is out of glimepiride (AMARYL) 4 MG tablet.    I called [facility] and spoke to pharmacist who noted pt picked up prescription on 4/26/2018 for 180 tablets. There is also 1 more fill left.    Pt thinks she may have the bottle at home. She will look and call us back if she cannot locate Rx.    Pharmacist said if she lost Rx we can call insurance and see if they will do an over-ride or pt can choose to pay out of pocket.    Sofía Bahena MA

## 2018-05-22 NOTE — MR AVS SNAPSHOT
After Visit Summary   5/22/2018    Mariel Ribeiro    MRN: [id number]           Patient Information     Date Of Birth          1945        Visit Information        Provider Department      5/22/2018 7:30 AM Rosina Blount NP [facility]        Today's Diagnoses     COPD exacerbation (H)    -  1    Chronic diastolic heart failure (H)        Acute right-sided thoracic back pain        Atherosclerosis of native coronary artery of native heart, angina presence unspecified          Care Instructions    Try using the Combivent 4 times a day (if you develop a rash, stop using).   Take Prednisone daily in the morning for 5 days.  Take Doxycycline twice daily for 5 days.  See Dr. William if no improvement in 3 days otherwise follow up with her next week.           Follow-ups after your visit        Your next 10 appointments already scheduled     Jun 27, 2018  4:40 PM CDT   (Arrive by 4:25 PM)   RETURN HEART FAILURE with Stephanie Wolf MD   [facility] ([facility])    [address] [place] [address]  [address] [place] [id number] [phone]              Who to contact     If you have questions or need follow up information about today's clinic visit or your schedule please contact [facility] directly at [phone].  Normal or non-critical lab and imaging results will be communicated to you by MyChart, letter or phone within 4 business days after the clinic has received the results. If you do not hear from us within 7 days, please contact the clinic through MyChart or phone. If you have a critical or abnormal lab result, we will notify you by phone as soon as possible.  Submit refill requests through [email or web address] or call your pharmacy and they will forward the refill request to us. Please allow 3 business days for your refill to be completed.          Additional Information About Your Visit        MyChart Information      [facility] gives you secure access to your electronic health record. If you see a primary care provider, you can also send messages to your care team and make appointments. If you have questions, please call your primary care clinic.  If you do not have a primary care provider, please call [phone] and they will assist you.        Care EveryWhere ID     This is your Care EveryWhere ID. This could be used by other organizations to access your [place] medical records  [id number]        Your Vitals Were     Pulse Temperature Respirations Pulse Oximetry BMI (Body Mass Index)       89 98.4  F (36.9  C) (Oral) 20 96% 48.49 kg/m2        Blood Pressure from Last 3 Encounters:   05/22/18 136/74   04/30/18 155/77   04/19/18 139/82    Weight from Last 3 Encounters:   05/22/18 305 lb (138.3 kg)   04/30/18 305 lb (138.3 kg)   03/26/18 305 lb (138.3 kg)              Today, you had the following     No orders found for display         Today's Medication Changes          These changes are accurate as of 5/22/18  8:25 AM.  If you have any questions, ask your nurse or doctor.               Start taking these medicines.        Dose/Directions    doxycycline 100 MG capsule   Commonly known as:  VIBRAMYCIN   Used for:  COPD exacerbation (H)   Started by:  Rosina Blount NP        Dose:  100 mg   Take 1 capsule (100 mg) by mouth 2 times daily for 7 days   Quantity:  14 capsule   Refills:  0       Ipratropium-Albuterol  MCG/ACT inhaler   Commonly known as:  COMBIVENT RESPIMAT   Used for:  COPD exacerbation (H)   Started by:  Rosina Blount NP        Dose:  1 puff   Inhale 1 puff into the lungs 4 times daily Not to exceed 6 doses per day.   Quantity:  1 Inhaler   Refills:  1       predniSONE 20 MG tablet   Commonly known as:  DELTASONE   Used for:  COPD exacerbation (H)   Started by:  Rosina Blount NP        Dose:  20 mg   Take 1 tablet (20 mg) by mouth 2 times daily   Quantity:  10 tablet   Refills:  0          These medicines have changed or have updated prescriptions.        Dose/Directions    cyclobenzaprine 10 MG tablet   Commonly known as:  FLEXERIL   This may have changed:  See the new instructions.   Used for:  Acute right-sided thoracic back pain   Changed by:  Rosina Blount NP        TAKE 1 TABLET(10 MG) BY MOUTH THREE TIMES DAILY AS NEEDED FOR MUSCLE SPASMS   Quantity:  90 tablet   Refills:  3         Stop taking these medicines if you haven't already. Please contact your care team if you have questions.     metaxalone 800 MG tablet   Commonly known as:  SKELAXIN   Stopped by:  Rosina Blount NP                Where to get your medicines      These medications were sent to [id number] - [place], [place] - [address] AT [facility] [address]  [facility] [address], [place] [address]    Hours:  24-hours Phone:  [phone]     cyclobenzaprine 10 MG tablet    doxycycline 100 MG capsule    Ipratropium-Albuterol  MCG/ACT inhaler    nitroGLYcerin 0.4 MG sublingual tablet    predniSONE 20 MG tablet                Primary Care Provider Office Phone # Fax #    Rafaela William [phone] [address] [place] [address]        Equal Access to Services     KJ SALVADOR AH: Hadii von ku hadasho Soomaali, waaxda luqadaha, qaybta kaalmada adeegyada, waxay idiin hayjosen [facility]. So [facility] [phone].    ATENCIÓN: Si habla español, tiene a gillespie disposición servicios gratuitos de asistencia lingüística. ame al [phone].    We comply with applicable federal civil rights laws and [place] laws. We do not discriminate on the basis of race, color, national origin, age, disability, sex, sexual orientation, or gender identity.            Thank you!     Thank you for choosing [facility]  for your care. Our goal is always to provide you with excellent care. Hearing back from our patients is one way we can continue to  improve our services. Please take a few minutes to complete the written survey that you may receive in the mail after your visit with us. Thank you!             Your Updated Medication List - Protect others around you: Learn how to safely use, store and throw away your medicines at www.disposemymeds.org.          This list is accurate as of 5/22/18  8:25 AM.  Always use your most recent med list.                   Brand Name Dispense Instructions for use Diagnosis    albuterol 108 (90 Base) MCG/ACT Inhaler    PROAIR HFA    1 Inhaler    INHALE 1 TO 2 PUFFS EVERY 4 TO 6 HOURS AS NEEDED    Chronic obstructive pulmonary disease with acute exacerbation (H)       aspirin 325 MG EC tablet     30 tablet    Take 1 tablet by mouth daily.        atorvastatin 40 MG tablet    LIPITOR    90 tablet    TAKE 1 TABLET(40 MG) BY MOUTH DAILY    Hyperlipidemia with target LDL less than 70       B-D U/F 31G X 5 MM   Generic drug:  insulin pen needle     100 each    USE FOR INSULIN INJECTION    Type 2 diabetes mellitus with diabetic polyneuropathy, without long-term current use of insulin (H)       blood glucose monitoring test strip    no brand specified    200 each    1 strip by In Vitro route 2 times daily Strips per patient's preference    Type 2 diabetes mellitus with diabetic polyneuropathy, without long-term current use of insulin (H)       cyclobenzaprine 10 MG tablet    FLEXERIL    90 tablet    TAKE 1 TABLET(10 MG) BY MOUTH THREE TIMES DAILY AS NEEDED FOR MUSCLE SPASMS    Acute right-sided thoracic back pain       cycloSPORINE 0.05 % ophthalmic emulsion    RESTASIS    180 each    Place 1 drop into both eyes 2 times daily    Keratitis sicca, bilateral       doxycycline 100 MG capsule    VIBRAMYCIN    14 capsule    Take 1 capsule (100 mg) by mouth 2 times daily for 7 days    COPD exacerbation (H)       EPINEPHrine 0.3 MG/0.3ML injection 2-pack    EPIPEN/ADRENACLICK/or ANY BX GENERIC EQUIV    0.6 mL    Inject 0.3 mLs (0.3 mg) into  the muscle once as needed for anaphylaxis    Allergic reaction, subsequent encounter       escitalopram 20 MG tablet    LEXAPRO    90 tablet    Take 1 tablet (20 mg) by mouth every morning    Major depressive disorder, single episode, in partial remission (H)       folic acid 800 MCG Tabs      Take 2 tablets by mouth daily 2 (400 MCG) tablets        furosemide 40 MG tablet    LASIX    120 tablet    TAKE 2 TABLETS(80 MG) BY MOUTH TWICE DAILY    Chronic diastolic heart failure (H)       glimepiride 4 MG tablet    AMARYL    180 tablet    TAKE 1 TABLET BY MOUTH TWICE DAILY(WITH MEALS)    Type 2 diabetes mellitus with chronic kidney disease, without long-term current use of insulin, unspecified CKD stage (H)       GUAIFENESIN  MG Tbcr     30    Take 600 mg by mouth twice daily        insulin glargine 100 UNIT/ML injection    LANTUS SOLOSTAR    15 mL    Inject 5 Units Subcutaneous At Bedtime    Type 2 diabetes mellitus with chronic kidney disease, without long-term current use of insulin, unspecified CKD stage (H)       Ipratropium-Albuterol  MCG/ACT inhaler    COMBIVENT RESPIMAT    1 Inhaler    Inhale 1 puff into the lungs 4 times daily Not to exceed 6 doses per day.    COPD exacerbation (H)       levothyroxine 150 MCG tablet    SYNTHROID/LEVOTHROID    90 tablet    Take 1 tablet (150 mcg) by mouth daily    Hypothyroidism, unspecified type       High Density NetworksCAN FINEPOINT LANCETS Misc     100 each    Use to test blood sugars 2 times daily or as directed.    Type 2 diabetes mellitus with diabetic polyneuropathy, without long-term current use of insulin (H)       LYRICA 100 MG capsule   Generic drug:  pregabalin     180 capsule    TAKE ONE CAPSULE BY MOUTH TWICE DAILY    Myalgia       metFORMIN 500 MG 24 hr tablet    GLUCOPHAGE-XR    360 tablet    TAKE 2 TABLETS BY MOUTH AFTER BREAKFAST AND SUPPER    Type 2 diabetes mellitus with diabetic polyneuropathy, without long-term current use of insulin (H)       Miconazole  Nitrate 2 % Powd     100 g    Apply to rash on chest three times daily.  Continue for one week after the rash has resolved.    Intertrigo       niacin 500 MG CR tablet    NIASPAN    180 tablet    TAKE 1 TABLET(500 MG) BY MOUTH TWICE DAILY    Hyperlipidemia with target LDL less than 70       nitroGLYcerin 0.4 MG sublingual tablet    NITROSTAT    25 tablet    Place 1 tablet (0.4 mg) under the tongue every 5 minutes as needed for chest pain As needed for chest pain.    Atherosclerosis of native coronary artery of native heart, angina presence unspecified       ONE TOUCH ULTRA (DEVICES) Misc     1    test blood sugar BID    Type II or unspecified type diabetes mellitus without mention of complication, not stated as uncontrolled       potassium chloride SA 10 MEQ CR tablet    K-DUR/KLOR-CON M    120 tablet    TAKE 2 TABLETS BY MOUTH TWICE DAILY    Hypokalemia, Chronic diastolic heart failure (H)       predniSONE 20 MG tablet    DELTASONE    10 tablet    Take 1 tablet (20 mg) by mouth 2 times daily    COPD exacerbation (H)       prochlorperazine 5 MG tablet    COMPAZINE    10 tablet    Take 1-2 tablets (5-10 mg) by mouth every 6 hours as needed (Headache)        senna-docusate 8.6-50 MG per tablet    SENOKOT-S;PERICOLACE    60 tablet    Take 1-2 tablets by mouth 2 times daily as needed for constipation    Constipation       sitagliptin 50 MG tablet    JANUVIA    90 tablet    Take 1 tablet (50 mg) by mouth daily    Type 2 diabetes mellitus with chronic kidney disease, without long-term current use of insulin, unspecified CKD stage (H)       tiZANidine 4 MG tablet    ZANAFLEX    90 tablet    TAKE 1 TO 2 TABLETS(4 TO 8 MG) BY MOUTH THREE TIMES DAILY AS NEEDED    Muscle spasm       traZODone 50 MG tablet    DESYREL    90 tablet    Take 3 tablets (150 mg) by mouth At Bedtime    Insomnia, unspecified type       ULORIC 40 MG Tabs tablet   Generic drug:  febuxostat     90 tablet    TAKE 1 TABLET(40 MG) BY MOUTH EVERY EVENING     Hyperuricemia       vitamin D3 5000 UNIT/ML Liqd      Take 10,000 Units by mouth daily

## 2018-05-29 DIAGNOSIS — E87.6 HYPOKALEMIA: ICD-10-CM

## 2018-05-29 DIAGNOSIS — I50.32 CHRONIC DIASTOLIC HEART FAILURE (H): ICD-10-CM

## 2018-05-29 NOTE — TELEPHONE ENCOUNTER
"Requested Prescriptions   Pending Prescriptions Disp Refills     potassium chloride SA (K-DUR/KLOR-CON M) 10 MEQ CR tablet  Last Written Prescription Date:  12-1-17  Last Fill Quantity: 120 tab,  # refills: 3   Last office visit: 5/22/2018 with prescribing provider:  Rafaela William    Future Office Visit:    120 tablet 3    Potassium Supplements Protocol Passed    5/29/2018 10:33 AM       Passed - Recent (12 mo) or future (30 days) visit within the authorizing provider's specialty    Patient had office visit in the last 12 months or has a visit in the next 30 days with authorizing provider or within the authorizing provider's specialty.  See \"Patient Info\" tab in inbasket, or \"Choose Columns\" in Meds & Orders section of the refill encounter.           Passed - Patient is age 18 or older       Passed - Normal serum potassium in past 12 months    Recent Labs   Lab Test  05/22/18   0833   POTASSIUM  3.8             "

## 2018-05-31 RX ORDER — POTASSIUM CHLORIDE 750 MG/1
TABLET, EXTENDED RELEASE ORAL
Qty: 360 TABLET | Refills: 1 | Status: ON HOLD | OUTPATIENT
Start: 2018-05-31 | End: 2018-07-13

## 2018-06-01 NOTE — TELEPHONE ENCOUNTER
Possible break in medication - patient reports compliance - Ran out three days ago     Prescription approved per [facility] Refill Protocol.    Signed Prescriptions:                        Disp   Refills    potassium chloride SA (K-MAGDI/KLOR-CON M) 1*360 ta*1        Sig: TAKE 2 TABLETS BY MOUTH TWICE DAILY  Authorizing Provider: INO JACOB  Ordering User: ABIMBOLA ANN      Closing encounter - no further actions needed at this time    Abimbola Ann RN

## 2018-06-12 ENCOUNTER — TELEPHONE (OUTPATIENT)
Dept: FAMILY MEDICINE | Facility: CLINIC | Age: 73
End: 2018-06-12

## 2018-06-12 ENCOUNTER — PRE VISIT (OUTPATIENT)
Dept: CARDIOLOGY | Facility: CLINIC | Age: 73
End: 2018-06-12

## 2018-06-12 NOTE — TELEPHONE ENCOUNTER
"Dr. William and Karen TOBAR  Review:  JANUVIA COST ISSUE    Patient calling today stating that her Januvia RX went from a reasonable affordable price to $500 for a refill.  This is too costly for her.    She is \"all out of that medication  for over a week now\"      Triage spoke with Lupe Hou) for explanation. She is in the \"donut hole\" coverage gap.    Any other alternatives for her?    Monica Gonzalez RN    "

## 2018-06-12 NOTE — TELEPHONE ENCOUNTER
mt  Notes -- will instruct triage nurse to have iván increase lantus dose by 5 units every day until I see her on 6-19 --she is to check am fasting blood sugars and ok to keep increasing daily until am fasting blood sugar is < 130 consistently.    Karen Hilton [facility].  Medication Therapy Management Provider  [phone]

## 2018-06-12 NOTE — TELEPHONE ENCOUNTER
Patient has Appt.  6/19 for MTM.  Could not come tomorrow to see you as has 2 other appts.     She reports BS running 350-450 in am and pm.  She is on glimeperide 4 mg BID.  Lantus 5 u at HS  She states she was told not to take the Metformin.     Any plan to increase her lantus dose? She would like to know.   Monica Gonzalez RN

## 2018-06-12 NOTE — TELEPHONE ENCOUNTER
Communicated the plan to patient and she repeated it back to nurse as stated below.  Understands the plan and encouraged to call with any questions or concerns.   Reviewed the Hyperglycemia sx with her.  Monica Gonzalez RN

## 2018-06-12 NOTE — TELEPHONE ENCOUNTER
6-12-18      [facility] advises --stay off januvia due to cost , have iván make appt. To see nicole -[facility] pharmacist to help go thru diabetes medication/cost options for her .    Thx.    Nicole Hilton [facility].  Medication Therapy Management Provider  [phone]

## 2018-06-13 ENCOUNTER — OFFICE VISIT (OUTPATIENT)
Dept: CARDIOLOGY | Facility: CLINIC | Age: 73
End: 2018-06-13
Attending: INTERNAL MEDICINE
Payer: MEDICARE

## 2018-06-13 VITALS
HEIGHT: 67 IN | SYSTOLIC BLOOD PRESSURE: 127 MMHG | WEIGHT: 293 LBS | OXYGEN SATURATION: 94 % | DIASTOLIC BLOOD PRESSURE: 73 MMHG | BODY MASS INDEX: 45.99 KG/M2 | HEART RATE: 75 BPM

## 2018-06-13 DIAGNOSIS — I51.89 OTHER ILL-DEFINED HEART DISEASES (CODE): ICD-10-CM

## 2018-06-13 DIAGNOSIS — I50.32 CHRONIC DIASTOLIC HEART FAILURE (H): ICD-10-CM

## 2018-06-13 DIAGNOSIS — R03.0 ELEVATED BLOOD PRESSURE READING IN OFFICE WITH WHITE COAT SYNDROME, WITHOUT DIAGNOSIS OF HYPERTENSION: ICD-10-CM

## 2018-06-13 DIAGNOSIS — R07.9 CHEST PAIN, UNSPECIFIED TYPE: Primary | ICD-10-CM

## 2018-06-13 DIAGNOSIS — T50.8X5D ALLERGIC REACTION TO CONTRAST MATERIAL, SUBSEQUENT ENCOUNTER: ICD-10-CM

## 2018-06-13 DIAGNOSIS — R06.02 SOB (SHORTNESS OF BREATH): ICD-10-CM

## 2018-06-13 PROCEDURE — 99215 OFFICE O/P EST HI 40 MIN: CPT | Mod: ZP | Performed by: INTERNAL MEDICINE

## 2018-06-13 PROCEDURE — 93010 ELECTROCARDIOGRAM REPORT: CPT | Mod: ZP | Performed by: INTERNAL MEDICINE

## 2018-06-13 PROCEDURE — G0463 HOSPITAL OUTPT CLINIC VISIT: HCPCS | Mod: 25,ZF

## 2018-06-13 PROCEDURE — 93005 ELECTROCARDIOGRAM TRACING: CPT | Mod: ZF

## 2018-06-13 RX ORDER — PREDNISONE 20 MG/1
TABLET ORAL
Qty: 3 TABLET | Refills: 0 | Status: SHIPPED | OUTPATIENT
Start: 2018-06-13 | End: 2018-06-29

## 2018-06-13 RX ORDER — DIPHENHYDRAMINE HYDROCHLORIDE 50 MG/ML
50 INJECTION INTRAMUSCULAR; INTRAVENOUS ONCE
Status: CANCELLED | OUTPATIENT
Start: 2018-06-13

## 2018-06-13 RX ORDER — SODIUM CHLORIDE 9 MG/ML
INJECTION, SOLUTION INTRAVENOUS CONTINUOUS
Status: CANCELLED | OUTPATIENT
Start: 2018-06-13

## 2018-06-13 RX ORDER — METHYLPREDNISOLONE SODIUM SUCCINATE 125 MG/2ML
125 INJECTION, POWDER, LYOPHILIZED, FOR SOLUTION INTRAMUSCULAR; INTRAVENOUS ONCE
Status: CANCELLED | OUTPATIENT
Start: 2018-06-13 | End: 2018-06-13

## 2018-06-13 ASSESSMENT — PAIN SCALES - GENERAL: PAINLEVEL: NO PAIN (0)

## 2018-06-13 NOTE — NURSING NOTE
Cardiac Testing: Patient given instructions regarding  24 hour ambulatory blood pressure monitor.  Patient demonstrated understanding of this information and agreed to call with further questions or concerns.  Left Heart Catheterization: Patient given Coronary Angiogram and Angioplasty: A Patient's Guide booklet. Patient was instructed regarding left heart catheterization, including discussion of the indication, procedure, preparation, intra-procedural steps, and recovery at home. Patient demonstrated understanding of this information and agreed to call with further questions or concerns.  Med Reconcile: Reviewed and verified all current medications with the patient. The updated medication list was printed and given to the patient.  Return Appointment: Follow up in one month. Patient given instructions regarding scheduling next clinic visit. Patient demonstrated understanding of this information and agreed to call with further questions or concerns.  Right Heart Catheterization: Patient was instructed regarding right heart catheterization, including discussion of the procedure, preparation, intra-procedural steps, and recovery at home. Patient demonstrated understanding of this information and agreed to call with further questions or concerns.  Patient stated she understood all health information given and agreed to call with further questions or concerns.
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
## 2018-06-13 NOTE — PATIENT INSTRUCTIONS
Someone will be calling you to schedule the Heart Cath and blood pressure monitor.    Please call if you have any questions or concerns.      Follow the American Heart Association Diet and Lifestyle recommendations:  Limit saturated fat, trans fat, sodium, red meat, sweets and sugar-sweetened beverages. If you choose to eat red meat, compare labels and select the leanest cuts available.  Aim for at least 150 minutes of moderate physical activity or 75 minutes of vigorous physical activity - or an equal combination of both - each week.      During business hours: [phone], press option # 1 to be routed to the [facility] then option # 3 for medical questions to speak with a nurse       After hours, weekends or holidays: On Call Cardiologist- [phone]   option #4 and ask to speak to the on-call Cardiologist. Inform them you are a CORE/heart failure patient at the [facility].          Peter Khan RN  Cardiology Nurse Care Coordinator

## 2018-06-13 NOTE — MR AVS SNAPSHOT
After Visit Summary   6/13/2018    Mariel Ribeiro    MRN: [id number]           Patient Information     Date Of Birth          1945        Visit Information        Provider Department      6/13/2018 3:00 PM Stephanie Wolf MD [facility]        Today's Diagnoses     Chest pain, unspecified type    -  1    Elevated blood pressure reading in office with white coat syndrome, without diagnosis of hypertension        Other ill-defined heart diseases (CODE)        Chronic diastolic heart failure (H)        SOB (shortness of breath)        Diabetes mellitus, type 2 (H)        Allergic reaction to contrast dye          Care Instructions    Someone will be calling you to schedule the Heart Cath and blood pressure monitor.    Please call if you have any questions or concerns.      Follow the American Heart Association Diet and Lifestyle recommendations:  Limit saturated fat, trans fat, sodium, red meat, sweets and sugar-sweetened beverages. If you choose to eat red meat, compare labels and select the leanest cuts available.  Aim for at least 150 minutes of moderate physical activity or 75 minutes of vigorous physical activity - or an equal combination of both - each week.      During business hours: [phone], press option # 1 to be routed to the [facility] then option # 3 for medical questions to speak with a nurse       After hours, weekends or holidays: On Call Cardiologist- [phone]   option #4 and ask to speak to the on-call Cardiologist. Inform them you are a CORE/heart failure patient at the [facility].          Peter Khan, RN  Cardiology Nurse Care Coordinator          Follow-ups after your visit        Additional Services     Follow-Up with Cardiologist       Please schedule a follow up appointment in one month.                  Your next 10 appointments already scheduled     Jun 19, 2018  2:30 PM CDT   SHORT with Karen Hilton RPH   [facility]  ([facility])    [address]  [address]  [place] [address]   [phone]            Jul 11, 2018  8:20 AM CDT   (Arrive by 8:05 AM)   RETURN HEART FAILURE with Stephanie Wolf MD   [facility] ([facility])    [address]  [address]  [facility] [address] [phone]              Future tests that were ordered for you today     Open Future Orders        Priority Expected Expires Ordered    Follow-Up with Cardiologist Routine 7/13/2018 9/11/2018 6/13/2018    Outpatient Coronary Angiography Adult Order Routine  9/1/2018 6/13/2018    24 Hour Blood Pressure Monitor - Adult Routine 6/20/2018 7/28/2018 6/13/2018            Who to contact     If you have questions or need follow up information about today's clinic visit or your schedule please contact [facility] directly at [phone].  Normal or non-critical lab and imaging results will be communicated to you by [facility] [employer], letter or phone within 4 business days after the clinic has received the results. If you do not hear from us within 7 days, please contact the clinic through [facility] or phone. If you have a critical or abnormal lab result, we will notify you by phone as soon as possible.  Submit refill requests through [employer] or call your pharmacy and they will forward the refill request to us. Please allow 3 business days for your refill to be completed.          Additional Information About Your Visit        [facility] [employer] Information     [employer] gives you secure access to your electronic health record. If you see a primary care provider, you can also send messages to your care team and make appointments. If you have questions, please call your primary care clinic.  If you do not have a primary care provider, please call [phone] and they will assist you.        Care EveryWhere ID     This is your Care EveryWhere ID. This could be used by other organizations to access your  "[facility] medical records  [id number]        Your Vitals Were     Pulse Height Pulse Oximetry BMI (Body Mass Index)          75 1.689 m (5' 6.5\") 94% 48.01 kg/m2         Blood Pressure from Last 3 Encounters:   06/13/18 127/73   05/22/18 136/74   04/30/18 155/77    Weight from Last 3 Encounters:   06/13/18 137 kg (302 lb)   05/22/18 138.3 kg (305 lb)   04/30/18 138.3 kg (305 lb)              We Performed the Following     EKG 12-lead, tracing only (Same Day)          Today's Medication Changes          These changes are accurate as of 6/13/18  4:28 PM.  If you have any questions, ask your nurse or doctor.               These medicines have changed or have updated prescriptions.        Dose/Directions    * predniSONE 20 MG tablet   Commonly known as:  DELTASONE   This may have changed:  Another medication with the same name was added. Make sure you understand how and when to take each.   Used for:  COPD exacerbation (H)   Changed by:  Stephanie Wolf MD        Dose:  20 mg   Take 1 tablet (20 mg) by mouth 2 times daily   Quantity:  10 tablet   Refills:  0       * predniSONE 20 MG tablet   Commonly known as:  DELTASONE   This may have changed:  You were already taking a medication with the same name, and this prescription was added. Make sure you understand how and when to take each.   Used for:  Allergic reaction to contrast dye   Changed by:  Stephanie Wolf MD        Take 60 MG the night before your coronary angiogram.   Quantity:  3 tablet   Refills:  0       * Notice:  This list has 2 medication(s) that are the same as other medications prescribed for you. Read the directions carefully, and ask your doctor or other care provider to review them with you.         Where to get your medicines      These medications were sent to [facility] [id number] - [place], [place] - [address] AT [facility] [address], [place] [address]    Hours:  24-hours Phone:  [phone] "     predniSONE 20 MG tablet                Primary Care Provider Office Phone # Fax #    Rafaela William [phone] [address]  [place] [address]        Equal Access to Services     KJ SALVADOR : Hadclark santillan tikao [place], waaxda luqadaha, qaybta kaalmada adeegyada, gopal pearcen mercy curry becky bird. So [facility] [phone].    ATENCIÓN: Si habla español, tiene a gillespie disposición servicios gratuitos de asistencia lingüística. Llame al [phone].    We comply with applicable federal civil rights laws and [place] laws. We do not discriminate on the basis of race, color, national origin, age, disability, sex, sexual orientation, or gender identity.            Thank you!     Thank you for choosing [facility]  for your care. Our goal is always to provide you with excellent care. Hearing back from our patients is one way we can continue to improve our services. Please take a few minutes to complete the written survey that you may receive in the mail after your visit with us. Thank you!             Your Updated Medication List - Protect others around you: Learn how to safely use, store and throw away your medicines at www.disposemymeds.org.          This list is accurate as of 6/13/18  4:28 PM.  Always use your most recent med list.                   Brand Name Dispense Instructions for use Diagnosis    albuterol 108 (90 Base) MCG/ACT Inhaler    PROAIR HFA    1 Inhaler    INHALE 1 TO 2 PUFFS EVERY 4 TO 6 HOURS AS NEEDED    Chronic obstructive pulmonary disease with acute exacerbation (H)       aspirin 325 MG EC tablet     30 tablet    Take 1 tablet by mouth daily.        atorvastatin 40 MG tablet    LIPITOR    90 tablet    TAKE 1 TABLET(40 MG) BY MOUTH DAILY    Hyperlipidemia with target LDL less than 70       B-D U/F 31G X 5 MM   Generic drug:  insulin pen needle     100 each    USE FOR INSULIN INJECTION    Type 2 diabetes mellitus with diabetic polyneuropathy,  without long-term current use of insulin (H)       blood glucose monitoring test strip    no brand specified    200 each    1 strip by In Vitro route 2 times daily Strips per patient's preference    Type 2 diabetes mellitus with diabetic polyneuropathy, without long-term current use of insulin (H)       cyclobenzaprine 10 MG tablet    FLEXERIL    90 tablet    TAKE 1 TABLET(10 MG) BY MOUTH THREE TIMES DAILY AS NEEDED FOR MUSCLE SPASMS    Acute right-sided thoracic back pain       cycloSPORINE 0.05 % ophthalmic emulsion    RESTASIS    180 each    Place 1 drop into both eyes 2 times daily    Keratitis sicca, bilateral       EPINEPHrine 0.3 MG/0.3ML injection 2-pack    EPIPEN/ADRENACLICK/or ANY BX GENERIC EQUIV    0.6 mL    Inject 0.3 mLs (0.3 mg) into the muscle once as needed for anaphylaxis    Allergic reaction, subsequent encounter       escitalopram 20 MG tablet    LEXAPRO    90 tablet    Take 1 tablet (20 mg) by mouth every morning    Major depressive disorder, single episode, in partial remission (H)       folic acid 800 MCG Tabs      Take 2 tablets by mouth daily 2 (400 MCG) tablets        furosemide 40 MG tablet    LASIX    120 tablet    TAKE 2 TABLETS(80 MG) BY MOUTH TWICE DAILY    Chronic diastolic heart failure (H)       glimepiride 4 MG tablet    AMARYL    180 tablet    TAKE 1 TABLET BY MOUTH TWICE DAILY(WITH MEALS)    Type 2 diabetes mellitus with chronic kidney disease, without long-term current use of insulin, unspecified CKD stage (H)       GUAIFENESIN  MG Tbcr     30    Take 600 mg by mouth twice daily        insulin glargine 100 UNIT/ML injection    LANTUS SOLOSTAR    15 mL    Inject 5 Units Subcutaneous At Bedtime    Type 2 diabetes mellitus with chronic kidney disease, without long-term current use of insulin, unspecified CKD stage (H)       Ipratropium-Albuterol  MCG/ACT inhaler    COMBIVENT RESPIMAT    1 Inhaler    Inhale 1 puff into the lungs 4 times daily Not to exceed 6 doses per  day.    COPD exacerbation (H)       levothyroxine 150 MCG tablet    SYNTHROID/LEVOTHROID    90 tablet    Take 1 tablet (150 mcg) by mouth daily    Hypothyroidism, unspecified type       aloomaCAN FINEPOINT LANCETS Misc     100 each    Use to test blood sugars 2 times daily or as directed.    Type 2 diabetes mellitus with diabetic polyneuropathy, without long-term current use of insulin (H)       LYRICA 100 MG capsule   Generic drug:  pregabalin     180 capsule    TAKE ONE CAPSULE BY MOUTH TWICE DAILY    Myalgia       metFORMIN 500 MG 24 hr tablet    GLUCOPHAGE-XR    360 tablet    TAKE 2 TABLETS BY MOUTH AFTER BREAKFAST AND SUPPER    Type 2 diabetes mellitus with diabetic polyneuropathy, without long-term current use of insulin (H)       Miconazole Nitrate 2 % Powd     100 g    Apply to rash on chest three times daily.  Continue for one week after the rash has resolved.    Intertrigo       niacin 500 MG CR tablet    NIASPAN    180 tablet    TAKE 1 TABLET(500 MG) BY MOUTH TWICE DAILY    Hyperlipidemia with target LDL less than 70       nitroGLYcerin 0.4 MG sublingual tablet    NITROSTAT    25 tablet    Place 1 tablet (0.4 mg) under the tongue every 5 minutes as needed for chest pain As needed for chest pain.    Atherosclerosis of native coronary artery of native heart, angina presence unspecified       ONE TOUCH ULTRA (DEVICES) Misc     1    test blood sugar BID    Type II or unspecified type diabetes mellitus without mention of complication, not stated as uncontrolled       potassium chloride SA 10 MEQ CR tablet    K-DUR/KLOR-CON M    360 tablet    TAKE 2 TABLETS BY MOUTH TWICE DAILY    Hypokalemia, Chronic diastolic heart failure (H)       * predniSONE 20 MG tablet    DELTASONE    10 tablet    Take 1 tablet (20 mg) by mouth 2 times daily    COPD exacerbation (H)       * predniSONE 20 MG tablet    DELTASONE    3 tablet    Take 60 MG the night before your coronary angiogram.    Allergic reaction to contrast dye        prochlorperazine 5 MG tablet    COMPAZINE    10 tablet    Take 1-2 tablets (5-10 mg) by mouth every 6 hours as needed (Headache)        senna-docusate 8.6-50 MG per tablet    SENOKOT-S;PERICOLACE    60 tablet    Take 1-2 tablets by mouth 2 times daily as needed for constipation    Constipation       sitagliptin 50 MG tablet    JANUVIA    90 tablet    Take 1 tablet (50 mg) by mouth daily    Type 2 diabetes mellitus with chronic kidney disease, without long-term current use of insulin, unspecified CKD stage (H)       tiZANidine 4 MG tablet    ZANAFLEX    90 tablet    TAKE 1 TO 2 TABLETS(4 TO 8 MG) BY MOUTH THREE TIMES DAILY AS NEEDED    Muscle spasm       traZODone 50 MG tablet    DESYREL    90 tablet    Take 3 tablets (150 mg) by mouth At Bedtime    Insomnia, unspecified type       ULORIC 40 MG Tabs tablet   Generic drug:  febuxostat     90 tablet    TAKE 1 TABLET(40 MG) BY MOUTH EVERY EVENING    Hyperuricemia       vitamin D3 5000 UNIT/ML Liqd      Take 10,000 Units by mouth daily        * Notice:  This list has 2 medication(s) that are the same as other medications prescribed for you. Read the directions carefully, and ask your doctor or other care provider to review them with you.

## 2018-06-13 NOTE — NURSING NOTE
Chief Complaint   Patient presents with     Follow Up For     Here to establish care for HFpEF, CAD     Vitals were taken and medications were reconciled.     JOSE GUADALUPE Farrar  3:19 PM
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
## 2018-06-14 LAB — INTERPRETATION ECG - MUSE: NORMAL

## 2018-06-15 ENCOUNTER — CARE COORDINATION (OUTPATIENT)
Dept: CARDIOLOGY | Facility: CLINIC | Age: 73
End: 2018-06-15

## 2018-06-15 NOTE — PROGRESS NOTES
Ordered coronary angiogram scheduled for 6/26/18 with arrival at 6:30. Patient called and voice message left with this information, awaiting call back to [facility] and discuss procedure and answer any questions.

## 2018-06-19 ENCOUNTER — OFFICE VISIT (OUTPATIENT)
Dept: PHARMACY | Facility: CLINIC | Age: 73
End: 2018-06-19
Payer: COMMERCIAL

## 2018-06-19 VITALS
DIASTOLIC BLOOD PRESSURE: 78 MMHG | OXYGEN SATURATION: 96 % | WEIGHT: 293 LBS | SYSTOLIC BLOOD PRESSURE: 132 MMHG | HEART RATE: 87 BPM | BODY MASS INDEX: 48.71 KG/M2

## 2018-06-19 DIAGNOSIS — E11.22 TYPE 2 DIABETES MELLITUS WITH CHRONIC KIDNEY DISEASE, WITHOUT LONG-TERM CURRENT USE OF INSULIN, UNSPECIFIED CKD STAGE (H): Primary | ICD-10-CM

## 2018-06-19 PROCEDURE — 99607 MTMS BY PHARM ADDL 15 MIN: CPT | Performed by: PHARMACIST

## 2018-06-19 PROCEDURE — 99605 MTMS BY PHARM NP 15 MIN: CPT | Performed by: PHARMACIST

## 2018-06-19 NOTE — PATIENT INSTRUCTIONS
Recommendations from today's MTM visit:                                                    MTM (medication therapy management) is a service provided by a clinical pharmacist designed to help you get the most of out of your medicines.   Today we reviewed what your medicines are for, how to know if they are working, that your medicines are safe and how to make your medicine regimen as easy as possible.     1. To protect your kidneys, you should not be taking metformin.     2. Stop taking the glimepiride.    3. Stop taking your lantus.     4. You will now be taking NPH and R insulins to control your blood sugar. The NPH will replace the lantus. The R insulin is for your meals. At dinner tonight, use a combination of NPH and R in the same syringe. Use 25 units of N (0.25 on regular syringe) and 10 units of R (0.10 on regular syringe). See your handout on how to do this in one syringe.     5. Tomorrow morning at 11 am for breakfast, do 25 units of N (0.25 on regular syringe) and 10 units of R (0.10 on regular syringe). Come and  your insulin syringes. At lunch do 10 units of R. At dinner do 25 units of N and 10 units of R.     Breakfast - 25 units of N and 10 units of R  Lunch - 10 units R  Dinner - 25 units of N and 10 units of R    Next MTM visit:  We will phone you on Thursday the 21st at 11:30 am    To schedule another MTM appointment, please call the clinic directly or you may call the MTM scheduling line at [phone] or toll-free at [phone].     My Clinical Pharmacist's contact information:                                                      It was a pleasure seeing you today!  Please feel free to contact me with any questions or concerns you have.      Karen Hilton, [facility].  Medication Therapy Management Provider  [phone]  Carolynmedardo Bowling, Pharm-D4      You may receive a survey about the MTM services you received.  I would appreciate your feedback to help me serve you better in the future.  Please fill it out and return it when you can. Your comments will be anonymous.

## 2018-06-19 NOTE — PROGRESS NOTES
"SUBJECTIVE/OBJECTIVE:                           Mariel Ribeiro is a 73 year old female coming in for an initial visit for Medication Therapy Management.  She was referred to me from Dr. William.     Chief Complaint: Patient here today to discuss blood sugar options as Januvia now $500 she is currently in the [facility].    Personal Healthcare Goals:     Allergies/ADRs: Reviewed in Epic  Tobacco: History of tobacco dependence - quit 7/1989  Alcohol: none  Caffeine: 1 cups/day of tea  Activity:   PMH: Reviewed in Epic    Medication Adherence/Access:  no issues reported    Diabetes:  Pt currently taking lantus 40 units at bedtime, glimepiride 4 mg BID. Pt is not experiencing side effects.  SMBG: two times daily to three times daily.   Ranges (from patient's glucose log): See below  Patient is not experiencing hypoglycemia  Recent symptoms of high blood sugar? vision changes, polyuria and fatigue  Eye exam: due  Foot exam: up to date  Microalbumin is < 30 mg/g. Pt is not taking an ACEi/ARB.  Aspirin: Taking 325mg daily and denies side effects  Diet/Exercise: She states she is watching carbs - eat veggies, chicken, turkey, pork, sweet potato, frozen cherries  Drinks vitamin water zero  Low sodium V8, wellness shakes     Mom had uncontrolled diabetes  - on insulin  Pneumonia in December and May - needed steroids     Date FBG/ 2hours post Lunch/2hours post Dinner /2hours post Bedtime   6/19 293      6/18 226   332 (40 units lantus)   6/17  260  288   6/16 408 337  530   6/15 542   432   6/14 348   \"Hi\" (>600)   6/13 414   576     Current labs include:  Today's Vitals: /78  Pulse 87  Wt 306 lb 6.4 oz (139 kg)  SpO2 96%  BMI 48.71 kg/m2  BP Readings from Last 3 Encounters:   06/19/18 132/78   06/13/18 127/73   05/22/18 136/74     Lab Results   Component Value Date    A1C 8.2 03/19/2018   .  Lab Results   Component Value Date    CHOL 203 03/19/2018     Lab Results   Component Value Date    TRIG 212 03/19/2018 "     Lab Results   Component Value Date    HDL 50 03/19/2018     Lab Results   Component Value Date     03/19/2018       Liver Function Studies -   Recent Labs   Lab Test  03/19/18   1404   PROTTOTAL  7.1   ALBUMIN  3.6   BILITOTAL  0.5   ALKPHOS  106   AST  27   ALT  24       Lab Results   Component Value Date    UCRR 48 03/19/2018    MICROL 13 03/19/2018    UMALCR 26.39 (H) 03/19/2018       Last Basic Metabolic Panel:  Lab Results   Component Value Date     05/22/2018      Lab Results   Component Value Date    POTASSIUM 3.8 05/22/2018     Lab Results   Component Value Date    CHLORIDE 104 05/22/2018     Lab Results   Component Value Date    BUN 14 05/22/2018     Lab Results   Component Value Date    CR 1.30 05/22/2018     GFR Estimate   Date Value Ref Range Status   05/22/2018 40 (L) >60 mL/min/1.7m2 Final     Comment:     Non  GFR Calc   04/30/2018 35 (L) >60 mL/min/1.7m2 Final     Comment:     Non  GFR Calc   03/19/2018 41 (L) >60 mL/min/1.7m2 Final     Comment:     Non  GFR Calc     GFR Estimate If Black   Date Value Ref Range Status   05/22/2018 49 (L) >60 mL/min/1.7m2 Final     Comment:      GFR Calc   04/30/2018 42 (L) >60 mL/min/1.7m2 Final     Comment:      GFR Calc   03/19/2018 50 (L) >60 mL/min/1.7m2 Final     Comment:      GFR Calc       Most Recent Immunizations   Administered Date(s) Administered     Influenza (High Dose) 3 valent vaccine 01/27/2016     Influenza (IIV3) PF 01/10/2013     Pneumo Conj 13-V (2010&after) 03/09/2016     Pneumococcal 23 valent 01/22/2014     TDAP Vaccine (Adacel) 07/07/2008       ASSESSMENT:                             Current medications were reviewed today.     Medication Adherence: good, no issues identified      Diabetes: Needs Improvement. Patient is not meeting A1c goal of < 8%. Self monitoring of blood glucose is not at goal of fasting  mg/dL and post  prandial < 180 mg/dL. Pt would benefit from ideal SMBG: Check blood sugars pre-prandial, 2 hours post-prandial, and with symptoms of hypoglycemia  Basal Insulin (Novolin NPH) :  Start at dose : See plan for details 25 units twice daily at breakfast and dinner, this insulin replaces her Lantus that is too expensive for her to continue to use.  Bolus / Rapid Acting Insulin (Novolin R) : Start at dose : 10 units 3 times a day before meals, Pharm.D. for educated patient on how to use syringe and vial and how to combo NPH and are together in one syringe at breakfast and dinner patient can successfully do this will call her in 2 days to check on blood sugar and process via phone.  FYI-patient needs heart cath procedure next Tuesday fairly certain blood sugars need to be under 200 in order to have surgery.  SFU (glimepiride) : Stop this medication now is ineffective, staff metformin per chronic kidney disease, do not use Actos due to heart condition.  DPP-4 inhibitor (Januvia) : Stop now as $500 per month cost in insurance donut [facility] makes this drug unaffordable.  Weight loss recommended.        PLAN:                            1. To protect your kidneys, you should not be taking metformin.     2. Stop taking the glimepiride.    3. Stop taking your lantus.     4. You will now be taking NPH and R insulins to control your blood sugar. The NPH will replace the lantus. The R insulin is for your meals. At dinner tonight, use a combination of NPH and R in the same syringe. Use 25 units of N (0.25 on regular syringe) and 10 units of R (0.10 on regular syringe). See your handout on how to do this in one syringe.     5. Tomorrow morning at 11 am for breakfast, do 25 units of N (0.25 on regular syringe) and 10 units of R (0.10 on regular syringe). Come and  your insulin syringes. At lunch do 10 units of R. At dinner do 25 units of N and 10 units of R.     Breakfast - 25 units of N and 10 units of R  Lunch - 10 units R  Dinner  - 25 units of N and 10 units of R    Next MTM visit:  We will phone you on Thursday the 21st at 11:30 am    I spent 60 minutes with this patient today. All changes were made via collaborative practice agreement with Rafaela William. A copy of the visit note was provided to the patient's primary care provider.        The patient was given a summary of these recommendations as an after visit summary.     Karen Hilton Rph.  Medication Therapy Management Provider  [phone]  Carolyn Bowling, Pharm-D4

## 2018-06-19 NOTE — Clinical Note
Rafaela  saw Mariel today only option was to add regular insulin to lower blood sugars took her off glimepiride and Lantus due to cost and effectiveness, my student educated her to do NPH and are in the same syringe for breakfast and dinner are only for lunch, she agreed this was very cost effective at $25 a vial I will follow-up with her in 2 days via the phone see how her blood sugars are looking.  Thank you, Noel

## 2018-06-19 NOTE — MR AVS SNAPSHOT
After Visit Summary   6/19/2018    Mariel Ribeiro    MRN: [id number]           Patient Information     Date Of Birth          1945        Visit Information        Provider Department      6/19/2018 2:30 PM Karen Hilton [facility] MTM        Today's Diagnoses     Type 2 diabetes mellitus with chronic kidney disease, without long-term current use of insulin, unspecified CKD stage (H)    -  1      Care Instructions    Recommendations from today's MTM visit:                                                    MTM (medication therapy management) is a service provided by a clinical pharmacist designed to help you get the most of out of your medicines.   Today we reviewed what your medicines are for, how to know if they are working, that your medicines are safe and how to make your medicine regimen as easy as possible.     1. To protect your kidneys, you should not be taking metformin.     2. Stop taking the glimepiride.    3. Stop taking your lantus.     4. You will now be taking NPH and R insulins to control your blood sugar. The NPH will replace the lantus. The R insulin is for your meals. At dinner tonight, use a combination of NPH and R in the same syringe. Use 25 units of N (0.25 on regular syringe) and 10 units of R (0.10 on regular syringe). See your handout on how to do this in one syringe.     5. Tomorrow morning at 11 am for breakfast, do 25 units of N (0.25 on regular syringe) and 10 units of R (0.10 on regular syringe). Come and  your insulin syringes. At lunch do 10 units of R. At dinner do 25 units of N and 10 units of R.     Breakfast - 25 units of N and 10 units of R  Lunch - 10 units R  Dinner - 25 units of N and 10 units of R    Next MTM visit:  We will phone you on Thursday the 21st at 11:30 am    To schedule another MTM appointment, please call the clinic directly or you may call the MTM scheduling line at [phone] or toll-free at [phone].      My Clinical Pharmacist's contact information:                                                      It was a pleasure seeing you today!  Please feel free to contact me with any questions or concerns you have.      Karen Hilton [facility].  Medication Therapy Management Provider  [phone]  Carolyn Bowling, Pharm-D4      You may receive a survey about the [facility] services you received.  I would appreciate your feedback to help me serve you better in the future. Please fill it out and return it when you can. Your comments will be anonymous.                  Follow-ups after your visit        Your next 10 appointments already scheduled     Jun 21, 2018 11:30 AM CDT   TELEMEDICINE with Karen Hilton RPH   [facility] ([facility])    [address] [address] [phone]           Note: this is not an onsite visit; there is no need to come to the facility.            Jun 26, 2018  6:30 AM CDT   LAB with UU LAB GOLD WAITING   [facility]    [address] [address]              Please do not eat 10-12 hours before your appointment if you are coming in fasting for labs on lipids, cholesterol, or glucose (sugar). This does not apply to pregnant women. Water, hot tea and black coffee (with nothing added) are okay. Do not drink other fluids, diet soda or chew gum.            Jun 26, 2018  7:00 AM CDT   Procedure 1.5 hr with U2A ROOM 17   [address] [facility] ([facility])    [address] [address]               Jun 26, 2018  8:30 AM CDT   Cath 90 Minute with UUHCVR3   [facility] ([facility])    [address] [address]   [phone]            Jul 11, 2018  8:20 AM CDT   (Arrive by 8:05 AM)   RETURN HEART FAILURE with MD GRANT Hernandez  [facility] ([facility])    [address]  [address]  [facility] [place] [address] [phone]              Who to contact     If you have questions or need follow up information about today's clinic visit or your schedule please contact [facility] directly at [phone].  Normal or non-critical lab and imaging results will be communicated to you by MyChart, letter or phone within 4 business days after the clinic has received the results. If you do not hear from us within 7 days, please contact the clinic through [facility] or phone. If you have a critical or abnormal lab result, we will notify you by phone as soon as possible.  Submit refill requests through [employer] or call your pharmacy and they will forward the refill request to us. Please allow 3 business days for your refill to be completed.          Additional Information About Your Visit        [facility] Information     [employer] gives you secure access to your electronic health record. If you see a primary care provider, you can also send messages to your care team and make appointments. If you have questions, please call your primary care clinic.  If you do not have a primary care provider, please call [phone] and they will assist you.        Care EveryWhere ID     This is your Care EveryWhere ID. This could be used by other organizations to access your [facility] medical records  [id number]        Your Vitals Were     Pulse Pulse Oximetry BMI (Body Mass Index)             87 96% 48.71 kg/m2          Blood Pressure from Last 3 Encounters:   06/19/18 132/78   06/13/18 127/73   05/22/18 136/74    Weight from Last 3 Encounters:   06/19/18 306 lb 6.4 oz (139 kg)   06/13/18 302 lb (137 kg)   05/22/18 305 lb (138.3 kg)              Today, you had the following     No orders found for display         Today's Medication Changes          These changes are accurate as of 6/19/18  4:20 PM.  If you have any  questions, ask your nurse or doctor.               Start taking these medicines.        Dose/Directions    insulin  UNIT/ML injection   Commonly known as:  NovoLIN N VIAL   Used for:  Type 2 diabetes mellitus with chronic kidney disease, without long-term current use of insulin, unspecified CKD stage (H)        25-35 units before breakfast  25-35 units before dinner   Quantity:  30 mL   Refills:  5       insulin regular 100 UNIT/ML injection   Commonly known as:  NovoLIN R VIAL   Used for:  Type 2 diabetes mellitus with chronic kidney disease, without long-term current use of insulin, unspecified CKD stage (H)        10-20 units before breakfast, 10-20 units before lunch, 10-20 units before dinner   Quantity:  20 mL   Refills:  5       insulin syringes (disposable) U-100 1 ML Misc   Used for:  Type 2 diabetes mellitus with chronic kidney disease, without long-term current use of insulin, unspecified CKD stage (H)        Dose:  1 each   1 each 5 times daily   Quantity:  100 each   Refills:  11         These medicines have changed or have updated prescriptions.        Dose/Directions    insulin glargine 100 UNIT/ML injection   Commonly known as:  LANTUS SOLOSTAR   This may have changed:  how much to take   Used for:  Type 2 diabetes mellitus with chronic kidney disease, without long-term current use of insulin, unspecified CKD stage (H)        Dose:  5 Units   Inject 5 Units Subcutaneous At Bedtime   Quantity:  15 mL   Refills:  1         Stop taking these medicines if you haven't already. Please contact your care team if you have questions.     Ipratropium-Albuterol  MCG/ACT inhaler   Commonly known as:  COMBIVENT RESPIMAT                Where to get your medicines      These medications were sent to [facility] - [place], [place] - [address]  [address], [place] [address]     Phone:  [phone]     insulin  UNIT/ML injection    insulin regular 100 UNIT/ML injection     insulin syringes (disposable) U-100 1 ML [facility]                Primary Care Provider Office Phone # Fax #    Rafaela William [phone] [address]  [place] [address]        Equal Access to Services     SOULEYMANEBRANDON MADELEINE AH: Hadii aad ku hadpapio Soomaali, waaxda luqadaha, qaybta kaalmada adeegyada, waxay idiin hayjosen adegladys curyr becky bird. So [facility] [phone].    ATENCIÓN: Si habla español, tiene a gillespie disposición servicios gratuitos de asistencia lingüística. Llame al [phone].    We comply with applicable federal civil rights laws and [place] laws. We do not discriminate on the basis of race, color, national origin, age, disability, sex, sexual orientation, or gender identity.            Thank you!     Thank you for choosing [facility]  for your care. Our goal is always to provide you with excellent care. Hearing back from our patients is one way we can continue to improve our services. Please take a few minutes to complete the written survey that you may receive in the mail after your visit with us. Thank you!             Your Updated Medication List - Protect others around you: Learn how to safely use, store and throw away your medicines at www.disposemymeds.org.          This list is accurate as of 6/19/18  4:20 PM.  Always use your most recent med list.                   Brand Name Dispense Instructions for use Diagnosis    albuterol 108 (90 Base) MCG/ACT Inhaler    PROAIR HFA    1 Inhaler    INHALE 1 TO 2 PUFFS EVERY 4 TO 6 HOURS AS NEEDED    Chronic obstructive pulmonary disease with acute exacerbation (H)       aspirin 325 MG EC tablet     30 tablet    Take 1 tablet by mouth daily.        atorvastatin 40 MG tablet    LIPITOR    90 tablet    TAKE 1 TABLET(40 MG) BY MOUTH DAILY    Hyperlipidemia with target LDL less than 70       B-D U/F 31G X 5 MM   Generic drug:  insulin pen needle     100 each    USE FOR INSULIN INJECTION    Type 2 diabetes  mellitus with diabetic polyneuropathy, without long-term current use of insulin (H)       blood glucose monitoring test strip    no brand specified    200 each    1 strip by In Vitro route 2 times daily Strips per patient's preference    Type 2 diabetes mellitus with diabetic polyneuropathy, without long-term current use of insulin (H)       cyclobenzaprine 10 MG tablet    FLEXERIL    90 tablet    TAKE 1 TABLET(10 MG) BY MOUTH THREE TIMES DAILY AS NEEDED FOR MUSCLE SPASMS    Acute right-sided thoracic back pain       cycloSPORINE 0.05 % ophthalmic emulsion    RESTASIS    180 each    Place 1 drop into both eyes 2 times daily    Keratitis sicca, bilateral       EPINEPHrine 0.3 MG/0.3ML injection 2-pack    EPIPEN/ADRENACLICK/or ANY BX GENERIC EQUIV    0.6 mL    Inject 0.3 mLs (0.3 mg) into the muscle once as needed for anaphylaxis    Allergic reaction, subsequent encounter       escitalopram 20 MG tablet    LEXAPRO    90 tablet    Take 1 tablet (20 mg) by mouth every morning    Major depressive disorder, single episode, in partial remission (H)       folic acid 800 MCG Tabs      Take 2 tablets by mouth daily 2 (400 MCG) tablets        furosemide 40 MG tablet    LASIX    120 tablet    TAKE 2 TABLETS(80 MG) BY MOUTH TWICE DAILY    Chronic diastolic heart failure (H)       glimepiride 4 MG tablet    AMARYL    180 tablet    TAKE 1 TABLET BY MOUTH TWICE DAILY(WITH MEALS)    Type 2 diabetes mellitus with chronic kidney disease, without long-term current use of insulin, unspecified CKD stage (H)       GUAIFENESIN  MG Tbcr     30    Take 600 mg by mouth twice daily        insulin glargine 100 UNIT/ML injection    LANTUS SOLOSTAR    15 mL    Inject 5 Units Subcutaneous At Bedtime    Type 2 diabetes mellitus with chronic kidney disease, without long-term current use of insulin, unspecified CKD stage (H)       insulin  UNIT/ML injection    NovoLIN N VIAL    30 mL    25-35 units before breakfast  25-35 units before  dinner    Type 2 diabetes mellitus with chronic kidney disease, without long-term current use of insulin, unspecified CKD stage (H)       insulin regular 100 UNIT/ML injection    NovoLIN R VIAL    20 mL    10-20 units before breakfast, 10-20 units before lunch, 10-20 units before dinner    Type 2 diabetes mellitus with chronic kidney disease, without long-term current use of insulin, unspecified CKD stage (H)       insulin syringes (disposable) U-100 1 ML Misc     100 each    1 each 5 times daily    Type 2 diabetes mellitus with chronic kidney disease, without long-term current use of insulin, unspecified CKD stage (H)       levothyroxine 150 MCG tablet    SYNTHROID/LEVOTHROID    90 tablet    Take 1 tablet (150 mcg) by mouth daily    Hypothyroidism, unspecified type       [employer] Software[employer] FINEPOINT LANCETS Misc     100 each    Use to test blood sugars 2 times daily or as directed.    Type 2 diabetes mellitus with diabetic polyneuropathy, without long-term current use of insulin (H)       LYRICA 100 MG capsule   Generic drug:  pregabalin     180 capsule    TAKE ONE CAPSULE BY MOUTH TWICE DAILY    Myalgia       metFORMIN 500 MG 24 hr tablet    GLUCOPHAGE-XR    360 tablet    TAKE 2 TABLETS BY MOUTH AFTER BREAKFAST AND SUPPER    Type 2 diabetes mellitus with diabetic polyneuropathy, without long-term current use of insulin (H)       Miconazole Nitrate 2 % Powd     100 g    Apply to rash on chest three times daily.  Continue for one week after the rash has resolved.    Intertrigo       niacin 500 MG CR tablet    NIASPAN    180 tablet    TAKE 1 TABLET(500 MG) BY MOUTH TWICE DAILY    Hyperlipidemia with target LDL less than 70       nitroGLYcerin 0.4 MG sublingual tablet    NITROSTAT    25 tablet    Place 1 tablet (0.4 mg) under the tongue every 5 minutes as needed for chest pain As needed for chest pain.    Atherosclerosis of native coronary artery of native heart, angina presence unspecified       ONE TOUCH ULTRA (DEVICES) Misc      1    test blood sugar BID    Type II or unspecified type diabetes mellitus without mention of complication, not stated as uncontrolled       potassium chloride SA 10 MEQ CR tablet    K-DUR/KLOR-CON M    360 tablet    TAKE 2 TABLETS BY MOUTH TWICE DAILY    Hypokalemia, Chronic diastolic heart failure (H)       * predniSONE 20 MG tablet    DELTASONE    10 tablet    Take 1 tablet (20 mg) by mouth 2 times daily    COPD exacerbation (H)       * predniSONE 20 MG tablet    DELTASONE    3 tablet    Take 60 MG the night before your coronary angiogram.    Allergic reaction to contrast dye       prochlorperazine 5 MG tablet    COMPAZINE    10 tablet    Take 1-2 tablets (5-10 mg) by mouth every 6 hours as needed (Headache)        senna-docusate 8.6-50 MG per tablet    SENOKOT-S;PERICOLACE    60 tablet    Take 1-2 tablets by mouth 2 times daily as needed for constipation    Constipation       sitagliptin 50 MG tablet    JANUVIA    90 tablet    Take 1 tablet (50 mg) by mouth daily    Type 2 diabetes mellitus with chronic kidney disease, without long-term current use of insulin, unspecified CKD stage (H)       tiZANidine 4 MG tablet    ZANAFLEX    90 tablet    TAKE 1 TO 2 TABLETS(4 TO 8 MG) BY MOUTH THREE TIMES DAILY AS NEEDED    Muscle spasm       traZODone 50 MG tablet    DESYREL    90 tablet    Take 3 tablets (150 mg) by mouth At Bedtime    Insomnia, unspecified type       ULORIC 40 MG Tabs tablet   Generic drug:  febuxostat     90 tablet    TAKE 1 TABLET(40 MG) BY MOUTH EVERY EVENING    Hyperuricemia       vitamin D3 5000 UNIT/ML Liqd      Take 10,000 Units by mouth daily        * Notice:  This list has 2 medication(s) that are the same as other medications prescribed for you. Read the directions carefully, and ask your doctor or other care provider to review them with you.

## 2018-06-21 ENCOUNTER — ALLIED HEALTH/NURSE VISIT (OUTPATIENT)
Dept: PHARMACY | Facility: CLINIC | Age: 73
End: 2018-06-21
Payer: COMMERCIAL

## 2018-06-21 DIAGNOSIS — E11.42 TYPE 2 DIABETES MELLITUS WITH DIABETIC POLYNEUROPATHY, WITHOUT LONG-TERM CURRENT USE OF INSULIN (H): ICD-10-CM

## 2018-06-21 PROCEDURE — 99606 MTMS BY PHARM EST 15 MIN: CPT | Mod: U4 | Performed by: PHARMACIST

## 2018-06-21 NOTE — MR AVS SNAPSHOT
After Visit Summary   6/21/2018    Mariel Ribeiro    MRN: [id number]           Patient Information     Date Of Birth          1945        Visit Information        Provider Department      6/21/2018 11:30 AM Karen Hilton RPH [facility]        Today's Diagnoses     Type 2 diabetes mellitus with diabetic polyneuropathy, without long-term current use of insulin (H)           Follow-ups after your visit        Your next 10 appointments already scheduled     Jun 26, 2018  6:30 AM CDT   LAB with UU LAB GOLD WAITING   [facility]    [address] [address]              Please do not eat 10-12 hours before your appointment if you are coming in fasting for labs on lipids, cholesterol, or glucose (sugar). This does not apply to pregnant women. Water, hot tea and black coffee (with nothing added) are okay. Do not drink other fluids, diet soda or chew gum.            Jun 26, 2018  7:00 AM CDT   Procedure 1.5 hr with U2A ROOM 17   [address] [facility] [place] ([facility])    [address] [address]               Jun 26, 2018  8:30 AM CDT   Cath 90 Minute with UUHCVR3   [facility]    [address] [address] [phone]            Jun 28, 2018 11:30 AM CDT   TELEMEDICINE with Karen Hilton RPH   [facility] ([facility])    [address]  [facility] [id number] [phone]           Note: this is not an onsite visit; there is no need to come to the facility.            Jul 11, 2018  8:20 AM CDT   (Arrive by 8:05 AM)   RETURN HEART FAILURE with Stephanie Wolf MD   [facility] ([facility])    [address]  [address]  [facility] [id number] [phone]               Who to contact     If you have questions or need follow up information about today's clinic visit or your schedule please contact [facility] directly at [phone].  Normal or non-critical lab and imaging results will be communicated to you by MyChart, letter or phone within 4 business days after the clinic has received the results. If you do not hear from us within 7 days, please contact the clinic through MyChart or phone. If you have a critical or abnormal lab result, we will notify you by phone as soon as possible.  Submit refill requests through [employer] or call your pharmacy and they will forward the refill request to us. Please allow 3 business days for your refill to be completed.          Additional Information About Your Visit        My[employer]und[employer] Information     [employer] gives you secure access to your electronic health record. If you see a primary care provider, you can also send messages to your care team and make appointments. If you have questions, please call your primary care clinic.  If you do not have a primary care provider, please call [phone] and they will assist you.        Care EveryWhere ID     This is your Care EveryWhere ID. This could be used by other organizations to access your [place] medical records  [id number]         Blood Pressure from Last 3 Encounters:   06/19/18 132/78   06/13/18 127/73   05/22/18 136/74    Weight from Last 3 Encounters:   06/19/18 306 lb 6.4 oz (139 kg)   06/13/18 302 lb (137 kg)   05/22/18 305 lb (138.3 kg)              Today, you had the following     No orders found for display         Today's Medication Changes          These changes are accurate as of 6/21/18 12:13 PM.  If you have any questions, ask your nurse or doctor.               These medicines have changed or have updated prescriptions.        Dose/Directions    insulin glargine 100 UNIT/ML injection   Commonly known as:  LANTUS SOLOSTAR   This may have changed:  how  much to take   Used for:  Type 2 diabetes mellitus with chronic kidney disease, without long-term current use of insulin, unspecified CKD stage (H)        Dose:  5 Units   Inject 5 Units Subcutaneous At Bedtime   Quantity:  15 mL   Refills:  1                Primary Care Provider Office Phone # Fax #    Rafaela William [phone] [address] [facility] [place]  [place] [address]        Equal Access to Services     KJ SALVADOR : Hadii aad ku hadasho Soomaali, waaxda luqadaha, qaybta kaalmada adeegyada, waxay idiin hayaan adeeg miguelinakeraabran lafarooq . So [facility] [phone].    ATENCIÓN: Si habla español, tiene a gillespie disposición servicios gratuitos de asistencia lingüística. Hebert al [phone].    We comply with applicable federal civil rights laws and [place] laws. We do not discriminate on the basis of race, color, national origin, age, disability, sex, sexual orientation, or gender identity.            Thank you!     Thank you for choosing [facility]  for your care. Our goal is always to provide you with excellent care. Hearing back from our patients is one way we can continue to improve our services. Please take a few minutes to complete the written survey that you may receive in the mail after your visit with us. Thank you!             Your Updated Medication List - Protect others around you: Learn how to safely use, store and throw away your medicines at www.disposemymeds.org.          This list is accurate as of 6/21/18 12:13 PM.  Always use your most recent med list.                   Brand Name Dispense Instructions for use Diagnosis    albuterol 108 (90 Base) MCG/ACT Inhaler    PROAIR HFA    1 Inhaler    INHALE 1 TO 2 PUFFS EVERY 4 TO 6 HOURS AS NEEDED    Chronic obstructive pulmonary disease with acute exacerbation (H)       aspirin 325 MG EC tablet     30 tablet    Take 1 tablet by mouth daily.        atorvastatin 40 MG tablet    LIPITOR    90 tablet    TAKE 1 TABLET(40  MG) BY MOUTH DAILY    Hyperlipidemia with target LDL less than 70       B-D U/F 31G X 5 MM   Generic drug:  insulin pen needle     100 each    USE FOR INSULIN INJECTION    Type 2 diabetes mellitus with diabetic polyneuropathy, without long-term current use of insulin (H)       blood glucose monitoring test strip    no brand specified    200 each    1 strip by In Vitro route 2 times daily Strips per patient's preference    Type 2 diabetes mellitus with diabetic polyneuropathy, without long-term current use of insulin (H)       cyclobenzaprine 10 MG tablet    FLEXERIL    90 tablet    TAKE 1 TABLET(10 MG) BY MOUTH THREE TIMES DAILY AS NEEDED FOR MUSCLE SPASMS    Acute right-sided thoracic back pain       cycloSPORINE 0.05 % ophthalmic emulsion    RESTASIS    180 each    Place 1 drop into both eyes 2 times daily    Keratitis sicca, bilateral       EPINEPHrine 0.3 MG/0.3ML injection 2-pack    EPIPEN/ADRENACLICK/or ANY BX GENERIC EQUIV    0.6 mL    Inject 0.3 mLs (0.3 mg) into the muscle once as needed for anaphylaxis    Allergic reaction, subsequent encounter       escitalopram 20 MG tablet    LEXAPRO    90 tablet    Take 1 tablet (20 mg) by mouth every morning    Major depressive disorder, single episode, in partial remission (H)       folic acid 800 MCG Tabs      Take 2 tablets by mouth daily 2 (400 MCG) tablets        furosemide 40 MG tablet    LASIX    120 tablet    TAKE 2 TABLETS(80 MG) BY MOUTH TWICE DAILY    Chronic diastolic heart failure (H)       glimepiride 4 MG tablet    AMARYL    180 tablet    TAKE 1 TABLET BY MOUTH TWICE DAILY(WITH MEALS)    Type 2 diabetes mellitus with chronic kidney disease, without long-term current use of insulin, unspecified CKD stage (H)       GUAIFENESIN  MG Tbcr     30    Take 600 mg by mouth twice daily        insulin glargine 100 UNIT/ML injection    LANTUS SOLOSTAR    15 mL    Inject 5 Units Subcutaneous At Bedtime    Type 2 diabetes mellitus with chronic kidney disease,  without long-term current use of insulin, unspecified CKD stage (H)       insulin  UNIT/ML injection    NovoLIN N VIAL    30 mL    25-35 units before breakfast  25-35 units before dinner    Type 2 diabetes mellitus with chronic kidney disease, without long-term current use of insulin, unspecified CKD stage (H)       insulin regular 100 UNIT/ML injection    NovoLIN R VIAL    20 mL    10-20 units before breakfast, 10-20 units before lunch, 10-20 units before dinner    Type 2 diabetes mellitus with chronic kidney disease, without long-term current use of insulin, unspecified CKD stage (H)       insulin syringes (disposable) U-100 1 ML Misc     100 each    1 each 5 times daily    Type 2 diabetes mellitus with chronic kidney disease, without long-term current use of insulin, unspecified CKD stage (H)       levothyroxine 150 MCG tablet    SYNTHROID/LEVOTHROID    90 tablet    Take 1 tablet (150 mcg) by mouth daily    Hypothyroidism, unspecified type       BaokuCAN FINEPOINT LANCETS Misc     100 each    Use to test blood sugars 2 times daily or as directed.    Type 2 diabetes mellitus with diabetic polyneuropathy, without long-term current use of insulin (H)       LYRICA 100 MG capsule   Generic drug:  pregabalin     180 capsule    TAKE ONE CAPSULE BY MOUTH TWICE DAILY    Myalgia       metFORMIN 500 MG 24 hr tablet    GLUCOPHAGE-XR    360 tablet    TAKE 2 TABLETS BY MOUTH AFTER BREAKFAST AND SUPPER    Type 2 diabetes mellitus with diabetic polyneuropathy, without long-term current use of insulin (H)       Miconazole Nitrate 2 % Powd     100 g    Apply to rash on chest three times daily.  Continue for one week after the rash has resolved.    Intertrigo       niacin 500 MG CR tablet    NIASPAN    180 tablet    TAKE 1 TABLET(500 MG) BY MOUTH TWICE DAILY    Hyperlipidemia with target LDL less than 70       nitroGLYcerin 0.4 MG sublingual tablet    NITROSTAT    25 tablet    Place 1 tablet (0.4 mg) under the tongue every 5  minutes as needed for chest pain As needed for chest pain.    Atherosclerosis of native coronary artery of native heart, angina presence unspecified       ONE TOUCH ULTRA (DEVICES) Misc     1    test blood sugar BID    Type II or unspecified type diabetes mellitus without mention of complication, not stated as uncontrolled       potassium chloride SA 10 MEQ CR tablet    K-DUR/KLOR-CON M    360 tablet    TAKE 2 TABLETS BY MOUTH TWICE DAILY    Hypokalemia, Chronic diastolic heart failure (H)       * predniSONE 20 MG tablet    DELTASONE    10 tablet    Take 1 tablet (20 mg) by mouth 2 times daily    COPD exacerbation (H)       * predniSONE 20 MG tablet    DELTASONE    3 tablet    Take 60 MG the night before your coronary angiogram.    Allergic reaction to contrast dye       prochlorperazine 5 MG tablet    COMPAZINE    10 tablet    Take 1-2 tablets (5-10 mg) by mouth every 6 hours as needed (Headache)        senna-docusate 8.6-50 MG per tablet    SENOKOT-S;PERICOLACE    60 tablet    Take 1-2 tablets by mouth 2 times daily as needed for constipation    Constipation       sitagliptin 50 MG tablet    JANUVIA    90 tablet    Take 1 tablet (50 mg) by mouth daily    Type 2 diabetes mellitus with chronic kidney disease, without long-term current use of insulin, unspecified CKD stage (H)       tiZANidine 4 MG tablet    ZANAFLEX    90 tablet    TAKE 1 TO 2 TABLETS(4 TO 8 MG) BY MOUTH THREE TIMES DAILY AS NEEDED    Muscle spasm       traZODone 50 MG tablet    DESYREL    90 tablet    Take 3 tablets (150 mg) by mouth At Bedtime    Insomnia, unspecified type       ULORIC 40 MG Tabs tablet   Generic drug:  febuxostat     90 tablet    TAKE 1 TABLET(40 MG) BY MOUTH EVERY EVENING    Hyperuricemia       vitamin D3 5000 UNIT/ML Liqd      Take 10,000 Units by mouth daily        * Notice:  This list has 2 medication(s) that are the same as other medications prescribed for you. Read the directions carefully, and ask your doctor or other care  provider to review them with you.

## 2018-06-21 NOTE — PROGRESS NOTES
"6-21-18        [facility] called pt. Today -- 2 day bs f/up \"      Her bs last night was 173  , this am 352.      nph-25 units twice daily and R dose is 10 units 3 x day .        Plan:    1.  Lets increase the dose of Nph to 30 units bid and R to 15 units tid ac and then 20 units tid ac.     Check bs's 2-3 x day as is.    Phone f/up : 1 week - 11;30am 6-28-18.      Karen Hilton [facility].  Medication Therapy Management Provider  [phone]        "

## 2018-06-22 DIAGNOSIS — B35.1 TINEA UNGUIUM: Primary | ICD-10-CM

## 2018-06-22 DIAGNOSIS — E11.42 TYPE 2 DIABETES MELLITUS WITH DIABETIC POLYNEUROPATHY, UNSPECIFIED LONG TERM INSULIN USE STATUS: ICD-10-CM

## 2018-06-25 ENCOUNTER — CARE COORDINATION (OUTPATIENT)
Dept: CARDIOLOGY | Facility: CLINIC | Age: 73
End: 2018-06-25

## 2018-06-25 NOTE — PROGRESS NOTES
Reminder message sent to pt via [employer] re: cors/RHC scheduled for tomorrow. Included instructions and callback number should pt have questions.

## 2018-06-25 NOTE — PROGRESS NOTES
Coronary angiogram instructions given. All questions answered to patient's satisfaction.     Specifically discussed patient's history of contrast dye allergy. Patient states she has the prednisone and will take 60 MG of Prednisone tonight.

## 2018-06-26 ENCOUNTER — APPOINTMENT (OUTPATIENT)
Dept: LAB | Facility: CLINIC | Age: 73
DRG: 234 | End: 2018-06-26
Attending: INTERNAL MEDICINE
Payer: MEDICARE

## 2018-06-26 ENCOUNTER — APPOINTMENT (OUTPATIENT)
Dept: ULTRASOUND IMAGING | Facility: CLINIC | Age: 73
DRG: 234 | End: 2018-06-26
Attending: PHYSICIAN ASSISTANT
Payer: MEDICARE

## 2018-06-26 ENCOUNTER — HOSPITAL ENCOUNTER (OUTPATIENT)
Facility: CLINIC | Age: 73
Discharge: HOME | DRG: 234 | End: 2018-06-26
Attending: INTERNAL MEDICINE | Admitting: INTERNAL MEDICINE
Payer: MEDICARE

## 2018-06-26 ENCOUNTER — APPOINTMENT (OUTPATIENT)
Dept: CT IMAGING | Facility: CLINIC | Age: 73
DRG: 234 | End: 2018-06-26
Attending: PHYSICIAN ASSISTANT
Payer: MEDICARE

## 2018-06-26 ENCOUNTER — APPOINTMENT (OUTPATIENT)
Dept: MEDSURG UNIT | Facility: CLINIC | Age: 73
DRG: 234 | End: 2018-06-26
Attending: INTERNAL MEDICINE
Payer: MEDICARE

## 2018-06-26 ENCOUNTER — APPOINTMENT (OUTPATIENT)
Dept: CARDIOLOGY | Facility: CLINIC | Age: 73
DRG: 234 | End: 2018-06-26
Attending: INTERNAL MEDICINE
Payer: MEDICARE

## 2018-06-26 VITALS
BODY MASS INDEX: 47.09 KG/M2 | HEIGHT: 66 IN | SYSTOLIC BLOOD PRESSURE: 140 MMHG | RESPIRATION RATE: 18 BRPM | DIASTOLIC BLOOD PRESSURE: 58 MMHG | WEIGHT: 293 LBS | HEART RATE: 90 BPM | TEMPERATURE: 98.2 F | OXYGEN SATURATION: 98 %

## 2018-06-26 DIAGNOSIS — R07.9 CHEST PAIN, UNSPECIFIED TYPE: ICD-10-CM

## 2018-06-26 DIAGNOSIS — I51.89 OTHER ILL-DEFINED HEART DISEASES (CODE): ICD-10-CM

## 2018-06-26 DIAGNOSIS — T50.8X5A ALLERGIC REACTION TO CONTRAST DYE: ICD-10-CM

## 2018-06-26 DIAGNOSIS — I25.83 CORONARY ATHEROSCLEROSIS DUE TO LIPID RICH PLAQUE: ICD-10-CM

## 2018-06-26 DIAGNOSIS — Z98.890 STATUS POST CORONARY ANGIOGRAM: ICD-10-CM

## 2018-06-26 DIAGNOSIS — R06.02 SOB (SHORTNESS OF BREATH): ICD-10-CM

## 2018-06-26 DIAGNOSIS — E11.9 DIABETES MELLITUS, TYPE 2 (H): ICD-10-CM

## 2018-06-26 DIAGNOSIS — I50.32 CHRONIC DIASTOLIC HEART FAILURE (H): ICD-10-CM

## 2018-06-26 LAB
ANION GAP SERPL CALCULATED.3IONS-SCNC: 10 MMOL/L (ref 3–14)
BUN SERPL-MCNC: 22 MG/DL (ref 7–30)
CALCIUM SERPL-MCNC: 9.5 MG/DL (ref 8.5–10.1)
CHLORIDE SERPL-SCNC: 106 MMOL/L (ref 94–109)
CO2 SERPL-SCNC: 25 MMOL/L (ref 20–32)
CREAT SERPL-MCNC: 1.28 MG/DL (ref 0.52–1.04)
ERYTHROCYTE [DISTWIDTH] IN BLOOD BY AUTOMATED COUNT: 14.2 % (ref 10–15)
GFR SERPL CREATININE-BSD FRML MDRD: 41 ML/MIN/1.7M2
GLUCOSE BLDC GLUCOMTR-MCNC: 230 MG/DL (ref 70–99)
GLUCOSE BLDC GLUCOMTR-MCNC: 239 MG/DL (ref 70–99)
GLUCOSE SERPL-MCNC: 210 MG/DL (ref 70–99)
HCT VFR BLD AUTO: 40.3 % (ref 35–47)
HGB BLD-MCNC: 13.7 G/DL (ref 11.7–15.7)
INTERPRETATION ECG - MUSE: NORMAL
KCT BLD-ACNC: 135 SEC (ref 75–150)
MCH RBC QN AUTO: 28.5 PG (ref 26.5–33)
MCHC RBC AUTO-ENTMCNC: 34 G/DL (ref 31.5–36.5)
MCV RBC AUTO: 84 FL (ref 78–100)
PLATELET # BLD AUTO: 139 10E9/L (ref 150–450)
POTASSIUM SERPL-SCNC: 3.6 MMOL/L (ref 3.4–5.3)
RBC # BLD AUTO: 4.8 10E12/L (ref 3.8–5.2)
SODIUM SERPL-SCNC: 141 MMOL/L (ref 133–144)
WBC # BLD AUTO: 6.6 10E9/L (ref 4–11)

## 2018-06-26 PROCEDURE — 93571 IV DOP VEL&/PRESS C FLO 1ST: CPT

## 2018-06-26 PROCEDURE — B2111ZZ FLUOROSCOPY OF MULTIPLE CORONARY ARTERIES USING LOW OSMOLAR CONTRAST: ICD-10-PCS | Performed by: INTERNAL MEDICINE

## 2018-06-26 PROCEDURE — C1887 CATHETER, GUIDING: HCPCS

## 2018-06-26 PROCEDURE — 27210787 ZZH MANIFOLD CR2

## 2018-06-26 PROCEDURE — 40000065 ZZH STATISTIC EKG NON-CHARGEABLE

## 2018-06-26 PROCEDURE — A9270 NON-COVERED ITEM OR SERVICE: HCPCS | Mod: GY | Performed by: INTERNAL MEDICINE

## 2018-06-26 PROCEDURE — 27210856 ZZH ACCESS HEART CATH CR2

## 2018-06-26 PROCEDURE — 71250 CT THORAX DX C-: CPT

## 2018-06-26 PROCEDURE — 27210843 ZZH DEVICE COMPRESSION CR12

## 2018-06-26 PROCEDURE — 80048 BASIC METABOLIC PNL TOTAL CA: CPT | Performed by: INTERNAL MEDICINE

## 2018-06-26 PROCEDURE — 25000131 ZZH RX MED GY IP 250 OP 636 PS 637: Mod: GY | Performed by: INTERNAL MEDICINE

## 2018-06-26 PROCEDURE — 93005 ELECTROCARDIOGRAM TRACING: CPT

## 2018-06-26 PROCEDURE — C1894 INTRO/SHEATH, NON-LASER: HCPCS

## 2018-06-26 PROCEDURE — 40000172 ZZH STATISTIC PROCEDURE PREP ONLY

## 2018-06-26 PROCEDURE — 82962 GLUCOSE BLOOD TEST: CPT

## 2018-06-26 PROCEDURE — C1769 GUIDE WIRE: HCPCS

## 2018-06-26 PROCEDURE — 27211181 ZZH BALLOON TIP PRESSURE CR5

## 2018-06-26 PROCEDURE — 4A033BC MEASUREMENT OF ARTERIAL PRESSURE, CORONARY, PERCUTANEOUS APPROACH: ICD-10-PCS | Performed by: INTERNAL MEDICINE

## 2018-06-26 PROCEDURE — 93970 EXTREMITY STUDY: CPT

## 2018-06-26 PROCEDURE — 85027 COMPLETE CBC AUTOMATED: CPT | Performed by: INTERNAL MEDICINE

## 2018-06-26 PROCEDURE — 25000131 ZZH RX MED GY IP 250 OP 636 PS 637: Mod: GY | Performed by: STUDENT IN AN ORGANIZED HEALTH CARE EDUCATION/TRAINING PROGRAM

## 2018-06-26 PROCEDURE — 93880 EXTRACRANIAL BILAT STUDY: CPT

## 2018-06-26 PROCEDURE — 27210893 ZZH CATH CR5

## 2018-06-26 PROCEDURE — 25000125 ZZHC RX 250: Performed by: STUDENT IN AN ORGANIZED HEALTH CARE EDUCATION/TRAINING PROGRAM

## 2018-06-26 PROCEDURE — 36415 COLL VENOUS BLD VENIPUNCTURE: CPT | Performed by: INTERNAL MEDICINE

## 2018-06-26 PROCEDURE — 93456 R HRT CORONARY ARTERY ANGIO: CPT

## 2018-06-26 PROCEDURE — 27210742 ZZH CATH CR1

## 2018-06-26 PROCEDURE — 25000128 H RX IP 250 OP 636: Performed by: INTERNAL MEDICINE

## 2018-06-26 PROCEDURE — 85347 COAGULATION TIME ACTIVATED: CPT

## 2018-06-26 PROCEDURE — 40000556 ZZH STATISTIC PERIPHERAL IV START W US GUIDANCE

## 2018-06-26 PROCEDURE — 27211236 ZZH CATHETER -FFR CR18

## 2018-06-26 PROCEDURE — 25000128 H RX IP 250 OP 636: Performed by: STUDENT IN AN ORGANIZED HEALTH CARE EDUCATION/TRAINING PROGRAM

## 2018-06-26 PROCEDURE — 27210946 ZZH KIT HC TOTES DISP CR8

## 2018-06-26 PROCEDURE — 25000132 ZZH RX MED GY IP 250 OP 250 PS 637: Mod: GY | Performed by: INTERNAL MEDICINE

## 2018-06-26 PROCEDURE — 27211089 ZZH KIT ACIST INJECTOR CR3

## 2018-06-26 PROCEDURE — 99152 MOD SED SAME PHYS/QHP 5/>YRS: CPT

## 2018-06-26 PROCEDURE — 99153 MOD SED SAME PHYS/QHP EA: CPT

## 2018-06-26 PROCEDURE — 4A023N6 MEASUREMENT OF CARDIAC SAMPLING AND PRESSURE, RIGHT HEART, PERCUTANEOUS APPROACH: ICD-10-PCS | Performed by: INTERNAL MEDICINE

## 2018-06-26 PROCEDURE — 93456 R HRT CORONARY ARTERY ANGIO: CPT | Mod: 26 | Performed by: INTERNAL MEDICINE

## 2018-06-26 PROCEDURE — 93010 ELECTROCARDIOGRAM REPORT: CPT | Performed by: INTERNAL MEDICINE

## 2018-06-26 RX ORDER — FLUMAZENIL 0.1 MG/ML
0.2 INJECTION, SOLUTION INTRAVENOUS
Status: DISCONTINUED | OUTPATIENT
Start: 2018-06-26 | End: 2018-06-26 | Stop reason: HOSPADM

## 2018-06-26 RX ORDER — FENTANYL CITRATE 50 UG/ML
25-50 INJECTION, SOLUTION INTRAMUSCULAR; INTRAVENOUS
Status: DISCONTINUED | OUTPATIENT
Start: 2018-06-26 | End: 2018-06-26 | Stop reason: HOSPADM

## 2018-06-26 RX ORDER — METOPROLOL TARTRATE 1 MG/ML
5 INJECTION, SOLUTION INTRAVENOUS EVERY 5 MIN PRN
Status: DISCONTINUED | OUTPATIENT
Start: 2018-06-26 | End: 2018-06-26 | Stop reason: HOSPADM

## 2018-06-26 RX ORDER — EPINEPHRINE 1 MG/ML
0.3 INJECTION, SOLUTION, CONCENTRATE INTRAVENOUS
Status: DISCONTINUED | OUTPATIENT
Start: 2018-06-26 | End: 2018-06-26 | Stop reason: HOSPADM

## 2018-06-26 RX ORDER — NICOTINE POLACRILEX 4 MG
15-30 LOZENGE BUCCAL
Status: DISCONTINUED | OUTPATIENT
Start: 2018-06-26 | End: 2018-06-26 | Stop reason: HOSPADM

## 2018-06-26 RX ORDER — PROTAMINE SULFATE 10 MG/ML
25-100 INJECTION, SOLUTION INTRAVENOUS EVERY 5 MIN PRN
Status: DISCONTINUED | OUTPATIENT
Start: 2018-06-26 | End: 2018-06-26 | Stop reason: HOSPADM

## 2018-06-26 RX ORDER — ATROPINE SULFATE 0.1 MG/ML
0.5 INJECTION INTRAVENOUS EVERY 5 MIN PRN
Status: DISCONTINUED | OUTPATIENT
Start: 2018-06-26 | End: 2018-06-26 | Stop reason: HOSPADM

## 2018-06-26 RX ORDER — EPTIFIBATIDE 2 MG/ML
180 INJECTION, SOLUTION INTRAVENOUS EVERY 10 MIN PRN
Status: DISCONTINUED | OUTPATIENT
Start: 2018-06-26 | End: 2018-06-26 | Stop reason: HOSPADM

## 2018-06-26 RX ORDER — DOBUTAMINE HYDROCHLORIDE 200 MG/100ML
2-20 INJECTION INTRAVENOUS CONTINUOUS PRN
Status: DISCONTINUED | OUTPATIENT
Start: 2018-06-26 | End: 2018-06-26 | Stop reason: HOSPADM

## 2018-06-26 RX ORDER — NALOXONE HYDROCHLORIDE 0.4 MG/ML
0.4 INJECTION, SOLUTION INTRAMUSCULAR; INTRAVENOUS; SUBCUTANEOUS EVERY 5 MIN PRN
Status: DISCONTINUED | OUTPATIENT
Start: 2018-06-26 | End: 2018-06-26 | Stop reason: HOSPADM

## 2018-06-26 RX ORDER — CLOPIDOGREL BISULFATE 75 MG/1
300-600 TABLET ORAL
Status: DISCONTINUED | OUTPATIENT
Start: 2018-06-26 | End: 2018-06-26 | Stop reason: HOSPADM

## 2018-06-26 RX ORDER — NALOXONE HYDROCHLORIDE 0.4 MG/ML
.1-.4 INJECTION, SOLUTION INTRAMUSCULAR; INTRAVENOUS; SUBCUTANEOUS
Status: DISCONTINUED | OUTPATIENT
Start: 2018-06-26 | End: 2018-06-26 | Stop reason: HOSPADM

## 2018-06-26 RX ORDER — ADENOSINE 3 MG/ML
12-12000 INJECTION, SOLUTION INTRAVENOUS
Status: DISCONTINUED | OUTPATIENT
Start: 2018-06-26 | End: 2018-06-26 | Stop reason: HOSPADM

## 2018-06-26 RX ORDER — ASPIRIN 81 MG/1
81-324 TABLET, CHEWABLE ORAL
Status: DISCONTINUED | OUTPATIENT
Start: 2018-06-26 | End: 2018-06-26 | Stop reason: HOSPADM

## 2018-06-26 RX ORDER — MAGNESIUM HYDROXIDE 1200 MG/15ML
1000 LIQUID ORAL CONTINUOUS PRN
Status: DISCONTINUED | OUTPATIENT
Start: 2018-06-26 | End: 2018-06-26 | Stop reason: HOSPADM

## 2018-06-26 RX ORDER — ENALAPRILAT 1.25 MG/ML
1.25-2.5 INJECTION INTRAVENOUS
Status: DISCONTINUED | OUTPATIENT
Start: 2018-06-26 | End: 2018-06-26 | Stop reason: HOSPADM

## 2018-06-26 RX ORDER — ATROPINE SULFATE 0.1 MG/ML
.5-1 INJECTION INTRAVENOUS
Status: DISCONTINUED | OUTPATIENT
Start: 2018-06-26 | End: 2018-06-26 | Stop reason: HOSPADM

## 2018-06-26 RX ORDER — HYDRALAZINE HYDROCHLORIDE 20 MG/ML
10-20 INJECTION INTRAMUSCULAR; INTRAVENOUS
Status: DISCONTINUED | OUTPATIENT
Start: 2018-06-26 | End: 2018-06-26 | Stop reason: HOSPADM

## 2018-06-26 RX ORDER — SODIUM NITROPRUSSIDE 25 MG/ML
100-200 INJECTION INTRAVENOUS
Status: DISCONTINUED | OUTPATIENT
Start: 2018-06-26 | End: 2018-06-26 | Stop reason: HOSPADM

## 2018-06-26 RX ORDER — PROTAMINE SULFATE 10 MG/ML
1-5 INJECTION, SOLUTION INTRAVENOUS
Status: COMPLETED | OUTPATIENT
Start: 2018-06-26 | End: 2018-06-26

## 2018-06-26 RX ORDER — ASPIRIN 325 MG
325 TABLET ORAL
Status: DISCONTINUED | OUTPATIENT
Start: 2018-06-26 | End: 2018-06-26 | Stop reason: HOSPADM

## 2018-06-26 RX ORDER — SODIUM CHLORIDE 9 MG/ML
INJECTION, SOLUTION INTRAVENOUS CONTINUOUS
Status: DISCONTINUED | OUTPATIENT
Start: 2018-06-26 | End: 2018-06-26 | Stop reason: HOSPADM

## 2018-06-26 RX ORDER — NITROGLYCERIN 5 MG/ML
100-500 VIAL (ML) INTRAVENOUS
Status: COMPLETED | OUTPATIENT
Start: 2018-06-26 | End: 2018-06-26

## 2018-06-26 RX ORDER — ACETAMINOPHEN 325 MG/1
650 TABLET ORAL EVERY 4 HOURS PRN
Status: DISCONTINUED | OUTPATIENT
Start: 2018-06-26 | End: 2018-06-26 | Stop reason: HOSPADM

## 2018-06-26 RX ORDER — NICARDIPINE HYDROCHLORIDE 2.5 MG/ML
100 INJECTION INTRAVENOUS
Status: DISCONTINUED | OUTPATIENT
Start: 2018-06-26 | End: 2018-06-26 | Stop reason: HOSPADM

## 2018-06-26 RX ORDER — DEXTROSE MONOHYDRATE 25 G/50ML
25-50 INJECTION, SOLUTION INTRAVENOUS
Status: DISCONTINUED | OUTPATIENT
Start: 2018-06-26 | End: 2018-06-26 | Stop reason: HOSPADM

## 2018-06-26 RX ORDER — PHENYLEPHRINE HCL IN 0.9% NACL 1 MG/10 ML
20-100 SYRINGE (ML) INTRAVENOUS
Status: DISCONTINUED | OUTPATIENT
Start: 2018-06-26 | End: 2018-06-26 | Stop reason: HOSPADM

## 2018-06-26 RX ORDER — PRASUGREL 10 MG/1
10-60 TABLET, FILM COATED ORAL
Status: DISCONTINUED | OUTPATIENT
Start: 2018-06-26 | End: 2018-06-26 | Stop reason: HOSPADM

## 2018-06-26 RX ORDER — LIDOCAINE HYDROCHLORIDE 10 MG/ML
30 INJECTION, SOLUTION EPIDURAL; INFILTRATION; INTRACAUDAL; PERINEURAL
Status: COMPLETED | OUTPATIENT
Start: 2018-06-26 | End: 2018-06-26

## 2018-06-26 RX ORDER — NITROGLYCERIN 20 MG/100ML
.07-1.46 INJECTION INTRAVENOUS CONTINUOUS PRN
Status: DISCONTINUED | OUTPATIENT
Start: 2018-06-26 | End: 2018-06-26 | Stop reason: HOSPADM

## 2018-06-26 RX ORDER — POTASSIUM CHLORIDE 7.45 MG/ML
10 INJECTION INTRAVENOUS
Status: DISCONTINUED | OUTPATIENT
Start: 2018-06-26 | End: 2018-06-26 | Stop reason: HOSPADM

## 2018-06-26 RX ORDER — HEPARIN SODIUM 1000 [USP'U]/ML
1000-10000 INJECTION, SOLUTION INTRAVENOUS; SUBCUTANEOUS EVERY 5 MIN PRN
Status: DISCONTINUED | OUTPATIENT
Start: 2018-06-26 | End: 2018-06-26 | Stop reason: HOSPADM

## 2018-06-26 RX ORDER — NALOXONE HYDROCHLORIDE 0.4 MG/ML
.2-.4 INJECTION, SOLUTION INTRAMUSCULAR; INTRAVENOUS; SUBCUTANEOUS
Status: DISCONTINUED | OUTPATIENT
Start: 2018-06-26 | End: 2018-06-26 | Stop reason: HOSPADM

## 2018-06-26 RX ORDER — METHYLPREDNISOLONE SODIUM SUCCINATE 125 MG/2ML
125 INJECTION, POWDER, LYOPHILIZED, FOR SOLUTION INTRAMUSCULAR; INTRAVENOUS ONCE
Status: COMPLETED | OUTPATIENT
Start: 2018-06-26 | End: 2018-06-26

## 2018-06-26 RX ORDER — DIPHENHYDRAMINE HYDROCHLORIDE 50 MG/ML
25-50 INJECTION INTRAMUSCULAR; INTRAVENOUS
Status: DISCONTINUED | OUTPATIENT
Start: 2018-06-26 | End: 2018-06-26 | Stop reason: HOSPADM

## 2018-06-26 RX ORDER — DOPAMINE HYDROCHLORIDE 160 MG/100ML
2-20 INJECTION, SOLUTION INTRAVENOUS CONTINUOUS PRN
Status: DISCONTINUED | OUTPATIENT
Start: 2018-06-26 | End: 2018-06-26 | Stop reason: HOSPADM

## 2018-06-26 RX ORDER — DIPHENHYDRAMINE HYDROCHLORIDE 50 MG/ML
50 INJECTION INTRAMUSCULAR; INTRAVENOUS ONCE
Status: COMPLETED | OUTPATIENT
Start: 2018-06-26 | End: 2018-06-26

## 2018-06-26 RX ORDER — NIFEDIPINE 10 MG/1
10 CAPSULE ORAL
Status: DISCONTINUED | OUTPATIENT
Start: 2018-06-26 | End: 2018-06-26 | Stop reason: HOSPADM

## 2018-06-26 RX ORDER — NITROGLYCERIN 0.4 MG/1
0.4 TABLET SUBLINGUAL EVERY 5 MIN PRN
Status: DISCONTINUED | OUTPATIENT
Start: 2018-06-26 | End: 2018-06-26 | Stop reason: HOSPADM

## 2018-06-26 RX ORDER — METHYLPREDNISOLONE SODIUM SUCCINATE 125 MG/2ML
125 INJECTION, POWDER, LYOPHILIZED, FOR SOLUTION INTRAMUSCULAR; INTRAVENOUS
Status: DISCONTINUED | OUTPATIENT
Start: 2018-06-26 | End: 2018-06-26 | Stop reason: HOSPADM

## 2018-06-26 RX ORDER — DEXTROSE MONOHYDRATE 25 G/50ML
12.5-5 INJECTION, SOLUTION INTRAVENOUS EVERY 30 MIN PRN
Status: DISCONTINUED | OUTPATIENT
Start: 2018-06-26 | End: 2018-06-26 | Stop reason: HOSPADM

## 2018-06-26 RX ORDER — ARGATROBAN 1 MG/ML
150 INJECTION, SOLUTION INTRAVENOUS
Status: DISCONTINUED | OUTPATIENT
Start: 2018-06-26 | End: 2018-06-26 | Stop reason: HOSPADM

## 2018-06-26 RX ORDER — HYDRALAZINE HYDROCHLORIDE 20 MG/ML
10 INJECTION INTRAMUSCULAR; INTRAVENOUS ONCE
Status: COMPLETED | OUTPATIENT
Start: 2018-06-26 | End: 2018-06-26

## 2018-06-26 RX ORDER — EPTIFIBATIDE 2 MG/ML
15 INJECTION, SOLUTION INTRAVENOUS CONTINUOUS PRN
Status: DISCONTINUED | OUTPATIENT
Start: 2018-06-26 | End: 2018-06-26 | Stop reason: HOSPADM

## 2018-06-26 RX ORDER — CLOPIDOGREL BISULFATE 75 MG/1
75 TABLET ORAL
Status: DISCONTINUED | OUTPATIENT
Start: 2018-06-26 | End: 2018-06-26 | Stop reason: HOSPADM

## 2018-06-26 RX ORDER — FUROSEMIDE 10 MG/ML
20-100 INJECTION INTRAMUSCULAR; INTRAVENOUS
Status: DISCONTINUED | OUTPATIENT
Start: 2018-06-26 | End: 2018-06-26 | Stop reason: HOSPADM

## 2018-06-26 RX ORDER — LORAZEPAM 2 MG/ML
.5-2 INJECTION INTRAMUSCULAR EVERY 4 HOURS PRN
Status: DISCONTINUED | OUTPATIENT
Start: 2018-06-26 | End: 2018-06-26 | Stop reason: HOSPADM

## 2018-06-26 RX ORDER — ARGATROBAN 1 MG/ML
350 INJECTION, SOLUTION INTRAVENOUS
Status: DISCONTINUED | OUTPATIENT
Start: 2018-06-26 | End: 2018-06-26 | Stop reason: HOSPADM

## 2018-06-26 RX ORDER — VERAPAMIL HYDROCHLORIDE 2.5 MG/ML
1-5 INJECTION, SOLUTION INTRAVENOUS
Status: DISCONTINUED | OUTPATIENT
Start: 2018-06-26 | End: 2018-06-26 | Stop reason: HOSPADM

## 2018-06-26 RX ORDER — POTASSIUM CHLORIDE 29.8 MG/ML
20 INJECTION INTRAVENOUS
Status: DISCONTINUED | OUTPATIENT
Start: 2018-06-26 | End: 2018-06-26 | Stop reason: HOSPADM

## 2018-06-26 RX ORDER — NITROGLYCERIN 5 MG/ML
100-200 VIAL (ML) INTRAVENOUS
Status: DISCONTINUED | OUTPATIENT
Start: 2018-06-26 | End: 2018-06-26 | Stop reason: HOSPADM

## 2018-06-26 RX ORDER — IOPAMIDOL 755 MG/ML
130 INJECTION, SOLUTION INTRAVASCULAR ONCE
Status: COMPLETED | OUTPATIENT
Start: 2018-06-26 | End: 2018-06-26

## 2018-06-26 RX ORDER — LIDOCAINE 40 MG/G
CREAM TOPICAL
Status: DISCONTINUED | OUTPATIENT
Start: 2018-06-26 | End: 2018-06-26 | Stop reason: HOSPADM

## 2018-06-26 RX ORDER — ONDANSETRON 2 MG/ML
4 INJECTION INTRAMUSCULAR; INTRAVENOUS EVERY 4 HOURS PRN
Status: DISCONTINUED | OUTPATIENT
Start: 2018-06-26 | End: 2018-06-26 | Stop reason: HOSPADM

## 2018-06-26 RX ADMIN — DIPHENHYDRAMINE HYDROCHLORIDE 50 MG: 50 INJECTION, SOLUTION INTRAMUSCULAR; INTRAVENOUS at 08:39

## 2018-06-26 RX ADMIN — ADENOSINE 80 MCG: 3 INJECTION, SOLUTION INTRAVENOUS at 10:15

## 2018-06-26 RX ADMIN — NICARDIPINE HYDROCHLORIDE 200 MCG: 2.5 INJECTION INTRAVENOUS at 09:42

## 2018-06-26 RX ADMIN — MIDAZOLAM 1 MG: 1 INJECTION INTRAMUSCULAR; INTRAVENOUS at 09:20

## 2018-06-26 RX ADMIN — HYDRALAZINE HYDROCHLORIDE 10 MG: 20 INJECTION INTRAMUSCULAR; INTRAVENOUS at 12:13

## 2018-06-26 RX ADMIN — HEPARIN SODIUM 1500 UNITS: 1000 INJECTION, SOLUTION INTRAVENOUS; SUBCUTANEOUS at 09:09

## 2018-06-26 RX ADMIN — LIDOCAINE HYDROCHLORIDE 30 ML: 10 INJECTION, SOLUTION EPIDURAL; INFILTRATION; INTRACAUDAL; PERINEURAL at 09:09

## 2018-06-26 RX ADMIN — NITROGLYCERIN 200 MCG: 5 INJECTION, SOLUTION INTRAVENOUS at 09:39

## 2018-06-26 RX ADMIN — PROTAMINE SULFATE 5 MG: 10 INJECTION, SOLUTION INTRAVENOUS at 10:26

## 2018-06-26 RX ADMIN — HEPARIN SODIUM 5000 UNITS: 1000 INJECTION, SOLUTION INTRAVENOUS; SUBCUTANEOUS at 09:42

## 2018-06-26 RX ADMIN — FENTANYL CITRATE 50 MCG: 50 INJECTION, SOLUTION INTRAMUSCULAR; INTRAVENOUS at 09:50

## 2018-06-26 RX ADMIN — MIDAZOLAM 1 MG: 1 INJECTION INTRAMUSCULAR; INTRAVENOUS at 09:38

## 2018-06-26 RX ADMIN — NITROGLYCERIN 200 MCG: 5 INJECTION, SOLUTION INTRAVENOUS at 09:43

## 2018-06-26 RX ADMIN — FENTANYL CITRATE 50 MCG: 50 INJECTION, SOLUTION INTRAMUSCULAR; INTRAVENOUS at 09:22

## 2018-06-26 RX ADMIN — IOPAMIDOL 130 ML: 755 INJECTION, SOLUTION INTRAVASCULAR at 10:30

## 2018-06-26 RX ADMIN — INSULIN HUMAN 20 UNITS: 100 INJECTION, SUSPENSION SUBCUTANEOUS at 08:48

## 2018-06-26 RX ADMIN — METHYLPREDNISOLONE SODIUM SUCCINATE 125 MG: 125 INJECTION, POWDER, FOR SOLUTION INTRAMUSCULAR; INTRAVENOUS at 08:36

## 2018-06-26 RX ADMIN — HEPARIN SODIUM 500 UNITS: 1000 INJECTION, SOLUTION INTRAVENOUS; SUBCUTANEOUS at 09:09

## 2018-06-26 RX ADMIN — ADENOSINE 80 MCG: 3 INJECTION, SOLUTION INTRAVENOUS at 10:11

## 2018-06-26 RX ADMIN — ASPIRIN 325 MG: 325 TABLET, DELAYED RELEASE ORAL at 08:00

## 2018-06-26 RX ADMIN — HEPARIN SODIUM 9000 UNITS: 1000 INJECTION, SOLUTION INTRAVENOUS; SUBCUTANEOUS at 10:04

## 2018-06-26 RX ADMIN — PROTAMINE SULFATE 100 MG: 10 INJECTION, SOLUTION INTRAVENOUS at 10:31

## 2018-06-26 RX ADMIN — INSULIN ASPART 1 UNITS: 100 INJECTION, SOLUTION INTRAVENOUS; SUBCUTANEOUS at 12:26

## 2018-06-26 ASSESSMENT — PAIN DESCRIPTION - DESCRIPTORS: DESCRIPTORS: ACHING

## 2018-06-26 NOTE — DISCHARGE INSTRUCTIONS
Going Home after an Angiogram  ______________________________________________    Patient Name: Mariel Ribeiro  Date of Procedure: June 26, 2018    After you go home:    Have an adult stay with you for 24 hours.    Drink plenty of fluids.    You may eat your normal diet, unless your doctor tells you otherwise.    For 24 hours:    Relax and take it easy.    Do NOT smoke.    Do NOT make any important or legal decisions.    Do NOT drive or operate machines at home or at work.    Do NOT drink alcohol.    Remove the Band-Aid after 24 hours. If there is minor oozing, apply another Band-aid and remove it after 12 hours.    For 2 days, do NOT have sex or do any heavy exercise.    Do NOT take a bath, or use a hot tub or pool for at least 3 days. You may shower.        Care of wrist or arm site  It is normal to have soreness at the puncture site and mild tingling in your hand for up to 3 days.    For 2 days, do not use your hand or arm to support your weight (such as rising from a chair) or bend your wrist (such as lifting a garage door).    For 2 days, do not lift more than 5 pounds or exercise your arm (tennis, golf or bowling).    If you start bleeding from the site in your arm:    Sit down and press firmly on the site with your fingers for 10 minutes. Call your doctor as soon as you can.    If the bleeding stops, sit still and keep your wrist straight for 2 hours.    Medicines    If you have started taking Plavix or Effient, do not stop taking it until you talk to your heart doctor (cardiologist).    If you are on metformin (Glucophage), do not restart it until you have blood tests (within 2 to 3 days after discharge). When your doctor tells you it is safe, you may restart the metformin.    If you have stopped any other medicines, check with your nurse or provider about when to restart them.    Call 911 right away if you have bleeding that is heavy or does not stop.    Call your doctor if:    You have a large or growing  hard lump around the site.    The site is red, swollen, hot or tender.    Blood or fluid is draining from the site.    You have chills or a fever greater than 101 F (38 C).    Your leg or arm feels numb or cool.    You have hives, a rash or unusual itching.      [facility] [place]:  [phone] (7 days a week)

## 2018-06-26 NOTE — IP AVS SNAPSHOT
MRN:[id number]                      After Visit Summary   6/26/2018    Mariel Ribeiro    MRN: [id number]           Visit Information        Department      6/26/2018  6:47 AM [address] [facility] [place]          Review of your medicines      CONTINUE these medicines which may have CHANGED, or have new prescriptions. If we are uncertain of the size of tablets/capsules you have at home, strength may be listed as something that might have changed.        Dose / Directions    insulin glargine 100 UNIT/ML injection   Commonly known as:  LANTUS SOLOSTAR   This may have changed:  how much to take   Used for:  Type 2 diabetes mellitus with chronic kidney disease, without long-term current use of insulin, unspecified CKD stage (H)        Dose:  5 Units   Inject 5 Units Subcutaneous At Bedtime   Quantity:  15 mL   Refills:  1         CONTINUE these medicines which have NOT CHANGED        Dose / Directions    albuterol 108 (90 Base) MCG/ACT Inhaler   Commonly known as:  PROAIR HFA   Used for:  Chronic obstructive pulmonary disease with acute exacerbation (H)        INHALE 1 TO 2 PUFFS EVERY 4 TO 6 HOURS AS NEEDED   Quantity:  1 Inhaler   Refills:  PRN       aspirin 325 MG EC tablet        Dose:  325 mg   Take 1 tablet by mouth daily.   Quantity:  30 tablet   Refills:  0       atorvastatin 40 MG tablet   Commonly known as:  LIPITOR   Used for:  Hyperlipidemia with target LDL less than 70        TAKE 1 TABLET(40 MG) BY MOUTH DAILY   Quantity:  90 tablet   Refills:  3       B-D U/F 31G X 5 MM   Used for:  Type 2 diabetes mellitus with diabetic polyneuropathy, without long-term current use of insulin (H)   Generic drug:  insulin pen needle        USE FOR INSULIN INJECTION   Quantity:  100 each   Refills:  0       blood glucose monitoring test strip   Commonly known as:  no brand specified   Used for:  Type 2 diabetes mellitus with diabetic polyneuropathy, without long-term current use of insulin (H)        Dose:  1  strip   1 strip by In Vitro route 2 times daily Strips per patient's preference   Quantity:  200 each   Refills:  3       cyclobenzaprine 10 MG tablet   Commonly known as:  FLEXERIL   Used for:  Acute right-sided thoracic back pain        TAKE 1 TABLET(10 MG) BY MOUTH THREE TIMES DAILY AS NEEDED FOR MUSCLE SPASMS   Quantity:  90 tablet   Refills:  3       cycloSPORINE 0.05 % ophthalmic emulsion   Commonly known as:  RESTASIS   Used for:  Keratitis sicca, bilateral        Dose:  1 drop   Place 1 drop into both eyes 2 times daily   Quantity:  180 each   Refills:  3       EPINEPHrine 0.3 MG/0.3ML injection 2-pack   Commonly known as:  EPIPEN/ADRENACLICK/or ANY BX GENERIC EQUIV   Used for:  Allergic reaction, subsequent encounter        Dose:  0.3 mg   Inject 0.3 mLs (0.3 mg) into the muscle once as needed for anaphylaxis   Quantity:  0.6 mL   Refills:  0       escitalopram 20 MG tablet   Commonly known as:  LEXAPRO   Used for:  Major depressive disorder, single episode, in partial remission (H)        Dose:  20 mg   Take 1 tablet (20 mg) by mouth every morning   Quantity:  90 tablet   Refills:  3       folic acid 800 MCG Tabs        Dose:  2 tablet   Take 2 tablets by mouth daily 2 (400 MCG) tablets   Refills:  0       furosemide 40 MG tablet   Commonly known as:  LASIX   Used for:  Chronic diastolic heart failure (H)        TAKE 2 TABLETS(80 MG) BY MOUTH TWICE DAILY   Quantity:  120 tablet   Refills:  3       glimepiride 4 MG tablet   Commonly known as:  AMARYL   Used for:  Type 2 diabetes mellitus with chronic kidney disease, without long-term current use of insulin, unspecified CKD stage (H)        TAKE 1 TABLET BY MOUTH TWICE DAILY(WITH MEALS)   Quantity:  180 tablet   Refills:  3       GUAIFENESIN  MG Tbcr        Take 600 mg by mouth twice daily   Quantity:  30   Refills:  0       insulin  UNIT/ML injection   Commonly known as:  NovoLIN N VIAL   Used for:  Type 2 diabetes mellitus with chronic kidney  disease, without long-term current use of insulin, unspecified CKD stage (H)        25-35 units before breakfast  25-35 units before dinner   Quantity:  30 mL   Refills:  5       insulin regular 100 UNIT/ML injection   Commonly known as:  NovoLIN R VIAL   Used for:  Type 2 diabetes mellitus with chronic kidney disease, without long-term current use of insulin, unspecified CKD stage (H)        10-20 units before breakfast, 10-20 units before lunch, 10-20 units before dinner   Quantity:  20 mL   Refills:  5       insulin syringes (disposable) U-100 1 ML Misc   Used for:  Type 2 diabetes mellitus with chronic kidney disease, without long-term current use of insulin, unspecified CKD stage (H)        Dose:  1 each   1 each 5 times daily   Quantity:  100 each   Refills:  11       levothyroxine 150 MCG tablet   Commonly known as:  SYNTHROID/LEVOTHROID   Used for:  Hypothyroidism, unspecified type        Dose:  150 mcg   Take 1 tablet (150 mcg) by mouth daily   Quantity:  90 tablet   Refills:  3       [employer] FINEPOINT LANCETS Misc   Used for:  Type 2 diabetes mellitus with diabetic polyneuropathy, without long-term current use of insulin (H)        Use to test blood sugars 2 times daily or as directed.   Quantity:  100 each   Refills:  prn       LYRICA 100 MG capsule   Used for:  Myalgia   Generic drug:  pregabalin        TAKE ONE CAPSULE BY MOUTH TWICE DAILY   Quantity:  180 capsule   Refills:  3       metFORMIN 500 MG 24 hr tablet   Commonly known as:  GLUCOPHAGE-XR   Used for:  Type 2 diabetes mellitus with diabetic polyneuropathy, without long-term current use of insulin (H)        TAKE 2 TABLETS BY MOUTH AFTER BREAKFAST AND SUPPER   Quantity:  360 tablet   Refills:  0       Miconazole Nitrate 2 % Powd   Used for:  Intertrigo        Apply to rash on chest three times daily.  Continue for one week after the rash has resolved.   Quantity:  100 g   Refills:  3       niacin 500 MG CR tablet   Commonly known as:  NIASPAN    Used for:  Hyperlipidemia with target LDL less than 70        TAKE 1 TABLET(500 MG) BY MOUTH TWICE DAILY   Quantity:  180 tablet   Refills:  1       nitroGLYcerin 0.4 MG sublingual tablet   Commonly known as:  NITROSTAT   Used for:  Atherosclerosis of native coronary artery of native heart, angina presence unspecified        Dose:  0.4 mg   Place 1 tablet (0.4 mg) under the tongue every 5 minutes as needed for chest pain As needed for chest pain.   Quantity:  25 tablet   Refills:  PRN       ONE TOUCH ULTRA (DEVICES) Misc   Used for:  Type II or unspecified type diabetes mellitus without mention of complication, not stated as uncontrolled        test blood sugar BID   Quantity:  1   Refills:  0       potassium chloride SA 10 MEQ CR tablet   Commonly known as:  K-DUR/KLOR-CON M   Used for:  Hypokalemia, Chronic diastolic heart failure (H)        TAKE 2 TABLETS BY MOUTH TWICE DAILY   Quantity:  360 tablet   Refills:  1       * predniSONE 20 MG tablet   Commonly known as:  DELTASONE   Used for:  COPD exacerbation (H)        Dose:  20 mg   Take 1 tablet (20 mg) by mouth 2 times daily   Quantity:  10 tablet   Refills:  0       * predniSONE 20 MG tablet   Commonly known as:  DELTASONE   Used for:  Allergic reaction to contrast dye        Take 60 MG the night before your coronary angiogram.   Quantity:  3 tablet   Refills:  0       prochlorperazine 5 MG tablet   Commonly known as:  COMPAZINE        Dose:  5-10 mg   Take 1-2 tablets (5-10 mg) by mouth every 6 hours as needed (Headache)   Quantity:  10 tablet   Refills:  0       senna-docusate 8.6-50 MG per tablet   Commonly known as:  SENOKOT-S;PERICOLACE   Used for:  Constipation        Dose:  1-2 tablet   Take 1-2 tablets by mouth 2 times daily as needed for constipation   Quantity:  60 tablet   Refills:  0       sitagliptin 50 MG tablet   Commonly known as:  JANUVIA   Used for:  Type 2 diabetes mellitus with chronic kidney disease, without long-term current use of  insulin, unspecified CKD stage (H)        Dose:  50 mg   Take 1 tablet (50 mg) by mouth daily   Quantity:  90 tablet   Refills:  3       tiZANidine 4 MG tablet   Commonly known as:  ZANAFLEX   Used for:  Muscle spasm        TAKE 1 TO 2 TABLETS(4 TO 8 MG) BY MOUTH THREE TIMES DAILY AS NEEDED   Quantity:  90 tablet   Refills:  3       traZODone 50 MG tablet   Commonly known as:  DESYREL   Used for:  Insomnia, unspecified type        Dose:  150 mg   Take 3 tablets (150 mg) by mouth At Bedtime   Quantity:  90 tablet   Refills:  7       ULORIC 40 MG Tabs tablet   Used for:  Hyperuricemia   Generic drug:  febuxostat        TAKE 1 TABLET(40 MG) BY MOUTH EVERY EVENING   Quantity:  90 tablet   Refills:  1       vitamin D3 5000 UNIT/ML Liqd   Indication:  Liquid gel caps        Dose:  39528 Units   Take 10,000 Units by mouth daily   Refills:  0       * Notice:  This list has 2 medication(s) that are the same as other medications prescribed for you. Read the directions carefully, and ask your doctor or other care provider to review them with you.             Protect others around you: Learn how to safely use, store and throw away your medicines at www.disposemymeds.org.         Follow-ups after your visit        Your next 10 appointments already scheduled     Jun 28, 2018 11:30 AM CDT   TELEMEDICINE with Karen Hilton RPH   [facility] ([facility])    [id number] [place] [address] [phone]           Note: this is not an onsite visit; there is no need to come to the facility.            Jul 03, 2018  4:30 PM CDT   (Arrive by 4:15 PM)   New Patient Visit with Joao Mason MD   [facility] ([facility])    [address] [place] [address]  [address] [place] [address] [phone]            Jul 11, 2018  8:20 AM CDT   (Arrive by 8:05 AM)   RETURN HEART FAILURE with Stephanie Wolf MD   [facility] ([facility])    [address]  [address]  [facility] [address] [phone]               Care Instructions        After Care Instructions     Discharge Instructions - IF on Metformin (Glucophage or Glucovance) or Metformin containing medications       IF on Metformin (Glucophage or Glucovance) or Metformin containing medications , schedule a Basic Metabolic Panel at [facility] in 48 - 72 hours post procedure and PRIOR TO resuming the Metformin or Metformin containing medications.  Hold Metformin (Glucophage or Glucovance) or Metformin containing medications until after the Basic Metabolic Panel on the 2nd or 3rd day following the procedure.  May resume after blood draw is complete.                  Further instructions from your care team         Going Home after an Angiogram  ______________________________________________    Patient Name: Mariel Ribeiro  Date of Procedure: June 26, 2018    After you go home:    Have an adult stay with you for 24 hours.    Drink plenty of fluids.    You may eat your normal diet, unless your doctor tells you otherwise.    For 24 hours:    Relax and take it easy.    Do NOT smoke.    Do NOT make any important or legal decisions.    Do NOT drive or operate machines at home or at work.    Do NOT drink alcohol.    Remove the Band-Aid after 24 hours. If there is minor oozing, apply another Band-aid and remove it after 12 hours.    For 2 days, do NOT have sex or do any heavy exercise.    Do NOT take a bath, or use a hot tub or pool for at least 3 days. You may shower.        Care of wrist or arm site  It is normal to have soreness at the puncture site and mild tingling in your hand for up to 3 days.    For 2 days, do not use your hand or arm to support your weight (such as rising from a chair) or bend your wrist (such as lifting a garage door).    For 2 days, do not lift more than 5 pounds or exercise your arm (tennis, golf or bowling).    If you  "start bleeding from the site in your arm:    Sit down and press firmly on the site with your fingers for 10 minutes. Call your doctor as soon as you can.    If the bleeding stops, sit still and keep your wrist straight for 2 hours.    Medicines    If you have started taking Plavix or Effient, do not stop taking it until you talk to your heart doctor (cardiologist).    If you are on metformin (Glucophage), do not restart it until you have blood tests (within 2 to 3 days after discharge). When your doctor tells you it is safe, you may restart the metformin.    If you have stopped any other medicines, check with your nurse or provider about when to restart them.    Call 911 right away if you have bleeding that is heavy or does not stop.    Call your doctor if:    You have a large or growing hard lump around the site.    The site is red, swollen, hot or tender.    Blood or fluid is draining from the site.    You have chills or a fever greater than 101 F (38 C).    Your leg or arm feels numb or cool.    You have hives, a rash or unusual itching.      [facility]:  [phone] (7 days a week)                   Additional Information About Your Visit        [employer] Information     [employer] gives you secure access to your electronic health record. If you see a primary care provider, you can also send messages to your care team and make appointments. If you have questions, please call your primary care clinic.  If you do not have a primary care provider, please call [phone] and they will assist you.        Care EveryWhere ID     This is your Care EveryWhere ID. This could be used by other organizations to access your [facility] medical records  [id number]        Your Vitals Were     Blood Pressure Pulse Temperature Respirations Height Weight    148/78 (BP Location: Left arm) 88 98.2  F (36.8  C) (Oral) 16 1.676 m (5' 6\") 137 kg (302 lb)    Pulse Oximetry BMI (Body Mass Index)    "             98% 48.74 kg/m2           Primary Care Provider Office Phone # Fax #    Rafaela William [phone]      Equal Access to Services     SOULEYMANEBRANDON MADELEINE : Hadii aad ku hadpapisha Martin, dario leobrodieha, yazta kalnenox taylor, gopal judahin hayaadl aguilagladys miguelinakeraabran bird. So [facility] [phone].    ATENCIÓN: Si habla español, tiene a gillespie disposición servicios gratuitos de asistencia lingüística. Llame al [phone].    We comply with applicable federal civil rights laws and [place] laws. We do not discriminate on the basis of race, color, national origin, age, disability, sex, sexual orientation, or gender identity.            Thank you!     Thank you for choosing [facility] for your care. Our goal is always to provide you with excellent care. Hearing back from our patients is one way we can continue to improve our services. Please take a few minutes to complete the written survey that you may receive in the mail after you visit with us. Thank you!             Medication List: This is a list of all your medications and when to take them. Check marks below indicate your daily home schedule. Keep this list as a reference.      Medications           Morning Afternoon Evening Bedtime As Needed    albuterol 108 (90 Base) MCG/ACT Inhaler   Commonly known as:  PROAIR HFA   INHALE 1 TO 2 PUFFS EVERY 4 TO 6 HOURS AS NEEDED                                aspirin 325 MG EC tablet   Take 1 tablet by mouth daily.   Last time this was given:  325 mg on 6/26/2018  8:00 AM                                atorvastatin 40 MG tablet   Commonly known as:  LIPITOR   TAKE 1 TABLET(40 MG) BY MOUTH DAILY                                B-D U/F 31G X 5 MM   USE FOR INSULIN INJECTION   Generic drug:  insulin pen needle                                blood glucose monitoring test strip   Commonly known as:  no brand specified   1 strip by In Vitro route 2 times daily Strips per patient's preference                                 cyclobenzaprine 10 MG tablet   Commonly known as:  FLEXERIL   TAKE 1 TABLET(10 MG) BY MOUTH THREE TIMES DAILY AS NEEDED FOR MUSCLE SPASMS                                cycloSPORINE 0.05 % ophthalmic emulsion   Commonly known as:  RESTASIS   Place 1 drop into both eyes 2 times daily                                EPINEPHrine 0.3 MG/0.3ML injection 2-pack   Commonly known as:  EPIPEN/ADRENACLICK/or ANY BX GENERIC EQUIV   Inject 0.3 mLs (0.3 mg) into the muscle once as needed for anaphylaxis                                escitalopram 20 MG tablet   Commonly known as:  LEXAPRO   Take 1 tablet (20 mg) by mouth every morning                                folic acid 800 MCG Tabs   Take 2 tablets by mouth daily 2 (400 MCG) tablets                                furosemide 40 MG tablet   Commonly known as:  LASIX   TAKE 2 TABLETS(80 MG) BY MOUTH TWICE DAILY                                glimepiride 4 MG tablet   Commonly known as:  AMARYL   TAKE 1 TABLET BY MOUTH TWICE DAILY(WITH MEALS)                                GUAIFENESIN  MG Tbcr   Take 600 mg by mouth twice daily                                insulin glargine 100 UNIT/ML injection   Commonly known as:  LANTUS SOLOSTAR   Inject 5 Units Subcutaneous At Bedtime                                insulin  UNIT/ML injection   Commonly known as:  NovoLIN N VIAL   25-35 units before breakfast  25-35 units before dinner   Last time this was given:  20 Units on 6/26/2018  8:48 AM                                insulin regular 100 UNIT/ML injection   Commonly known as:  NovoLIN R VIAL   10-20 units before breakfast, 10-20 units before lunch, 10-20 units before dinner                                insulin syringes (disposable) U-100 1 ML Misc   1 each 5 times daily                                levothyroxine 150 MCG tablet   Commonly known as:  SYNTHROID/LEVOTHROID   Take 1 tablet (150 mcg) by mouth daily                                [employer]  FINEPOINT LANCETS Misc   Use to test blood sugars 2 times daily or as directed.                                LYRICA 100 MG capsule   TAKE ONE CAPSULE BY MOUTH TWICE DAILY   Generic drug:  pregabalin                                metFORMIN 500 MG 24 hr tablet   Commonly known as:  GLUCOPHAGE-XR   TAKE 2 TABLETS BY MOUTH AFTER BREAKFAST AND SUPPER                                Miconazole Nitrate 2 % Powd   Apply to rash on chest three times daily.  Continue for one week after the rash has resolved.                                niacin 500 MG CR tablet   Commonly known as:  NIASPAN   TAKE 1 TABLET(500 MG) BY MOUTH TWICE DAILY                                nitroGLYcerin 0.4 MG sublingual tablet   Commonly known as:  NITROSTAT   Place 1 tablet (0.4 mg) under the tongue every 5 minutes as needed for chest pain As needed for chest pain.                                ONE TOUCH ULTRA (DEVICES) Misc   test blood sugar BID                                potassium chloride SA 10 MEQ CR tablet   Commonly known as:  K-DUR/KLOR-CON M   TAKE 2 TABLETS BY MOUTH TWICE DAILY                                * predniSONE 20 MG tablet   Commonly known as:  DELTASONE   Take 1 tablet (20 mg) by mouth 2 times daily                                * predniSONE 20 MG tablet   Commonly known as:  DELTASONE   Take 60 MG the night before your coronary angiogram.                                prochlorperazine 5 MG tablet   Commonly known as:  COMPAZINE   Take 1-2 tablets (5-10 mg) by mouth every 6 hours as needed (Headache)                                senna-docusate 8.6-50 MG per tablet   Commonly known as:  SENOKOT-S;PERICOLACE   Take 1-2 tablets by mouth 2 times daily as needed for constipation                                sitagliptin 50 MG tablet   Commonly known as:  JANUVIA   Take 1 tablet (50 mg) by mouth daily                                tiZANidine 4 MG tablet   Commonly known as:  ZANAFLEX   TAKE 1 TO 2 TABLETS(4 TO 8 MG) BY  MOUTH THREE TIMES DAILY AS NEEDED                                traZODone 50 MG tablet   Commonly known as:  DESYREL   Take 3 tablets (150 mg) by mouth At Bedtime                                ULORIC 40 MG Tabs tablet   TAKE 1 TABLET(40 MG) BY MOUTH EVERY EVENING   Generic drug:  febuxostat                                vitamin D3 5000 UNIT/ML Liqd   Take 10,000 Units by mouth daily                                * Notice:  This list has 2 medication(s) that are the same as other medications prescribed for you. Read the directions carefully, and ask your doctor or other care provider to review them with you.

## 2018-06-26 NOTE — PROGRESS NOTES
Pt returned post cors and rhc. R neck is dry and intact; R wrist is dry but puffy between 2 bands on. Pulse palpable, fingers on R bluish and slightly cool. Pt awake and alert, reports pain at right wrist. Will continue to monitor.

## 2018-06-26 NOTE — PROCEDURES
PRELIMINARY CARDIAC CATH REPORT:     PROCEDURES PERFORMED:   Right Heart Catheterization  Coronary Angiography  Physiologic Assessment (FFR)    PHYSICIANS:  Attending Physician: Andrea Kimball MD  Interventional Cardiology Fellow: None  Cardiology Fellow: Roney Mercer MD    INDICATION:  Mariel Ribeiro is a 73 year old female with risk factor profile (+) HTN, Type II DM, (+) hypercholesterolemia, (-) tobacco use, (-) fam Hx premature CAD, who presents on an elective basis. The clinical presentation was Suspected CAD.    The indication for the procedure was increasing chest pain with stable angina    DESCRIPTION:  1. Consent obtained with discussion of risks.  All questions were answered.  2. Sterile prep and procedure.  3. Location with Sheaths:   RT Radial Arterial  6 Fr  10 cm [short]  4. Access: Local anesthetic with lidocaine.  A micropuncture 21 guage needle with ultrasound guidance was used to establish vascular access using a modified Seldinger technique.  5. Diagnostic Catheters:   6 Fr  JR4  6. Guiding Catheters:  6 Fr  Ikari LF 3.5  6. Estimated blood loss: < 5 ml    MEDICATIONS:  The procedure was performed under conscious sedation for 65 minutes from 9 20 AM  The patient was assessed immediately before the first sedation medication was administered.  Midazolam 2 mg and Fentanyl 100 mcg were administered.  Heart rate, BP, respiration, oxygen saturation and patient responses were monitored throughout the procedure with the assistance of the RN under my supervision.  >> IA Nicardipine and IA NTG were administered to prophylax against radial artery spasm.  >> Antiplatelet Therapy: ASA 81 mg     Procedures:    HEMODYNAMICS:  BSA 2.53  1. HR 88 bpm  2. /69 mmHg  3. RA 7/6/5   4. RV 35/8  5. PA 35/16/23   6. PCW 12/12/8   7. PA sat 75%   8. PCW sat 88 %  9. Hgb 13.8 g/dL   10. Juanita CO 8.8  11. Juanita CI 3.7   12. TD CO 7.5   13. TD CI 3.1   14. PVR 1.7     Coronary angiogram.  1. Both coronary arteries  arise from the left cusp.  2. Right dominant.  3. LM has mild luminal irregularities.   4. LAD supplies the entire apex (type 3) and gives rise to septal perforators, D1, D2, D3 and D4.  The pLAD has 40%-50% disease with mild in-stent restenosis of the previously placed stent, mLAD has a 30% stenosis, dLAD has a a tight focal 80% stenosis. D1 has 50% proximal disease and D2-D4 are all small and have mild disease. LAD provides collaterals to the PDA and the distal LAD after the stenotic segment provides collaterals to the RV marginal branches.   5. LCX gives rise to OM1 and OM2 vessels.  The ostial and pLCx has a 50-60% stenosis, mLCx and dLCx have mild luminal irregularities. The LCx provides collaterals to the posterolateral branch. The OM1 is a branching vessel with three branches.The upper and lower branches are sub totally occluded. The lower branch appears to be a sub total receiving collaterals from the LAD. The OM2 is a large branching vessel with upper and lower branches. There is mild to moderate diffuse disease with a focal 80% lesion proximal-mid OM2 and a more distal disease in the upper branch of OM2 with 80% focal lesion.   6. RCA arises from the left coronary cusp and was engaged with a JR4 catheter. There is a chronic total occlusion of the proximal RCA and then sequential 90-99% stenosis of the mid and distal RCA segments. There are extensive bridging collaterals bridging the sequential RCA lesions. There is 90% disease at the origin of the PDA and 75% disease of the rPL. Thr rPL receives separate collaterals from the LCx and the rPDA receives collaterals from the septal LAD. Apical LAD fills the marginal RCA branch.            FUNCTIONAL ASSESSMENT:  Adequate anticoagulation was was obtained.     LCX:     Adequate anticoagulation was was obtained with IV UFH. A NAVUS catheter was advanced over the wire across the proximal LCx lesion wire wire was advanced distal to the proximal lesion and placed in  OM2.  Baseline FFR of the proximal LCx was 0.89 and hyperemia was induced with intracoronary adenosine (80 mcg) and the FFR was measured to be 0.79. We then measured the distal lesion in the OM2 branch and FFR at baseline was 0.76 and we did not induce hyperemia.     Sheath Removal:  The 6 Fr sheath was manually removed in the cardiac catheterization laboratory.    Contrast: Isovue, 130 ml     Fluoroscopy Time: 18.1 min    COMPLICATIONS:  1.Very small radial hematoma that is stable with a second TR band. ACT is down to 130.    SUMMARY:   >> Normal right sided filling pressures with mean RA pressure of 5 mm Hg  >> Normal left sided filling pressures with wedge pressure of 8 mm Hg  >> Borderline elevated pulmonary pressures with PA mean of 23 mm Hg  >> Normal cardiac output, 8.8 L/min with index 3.7 L/min/m2  Three vessel CAD RCA, LAD and LCX without left main lesion and RCA .    PLAN:   >> ASA 81 mg qd and statin  >> Cardiothoracic surgical consult for CABG considering triple vessel disease with diabetes and possibly very high syntax score. She has targets including mid LAD, rPDA, rPL, OM2 and diagonal (D2)  >> Small hematoma, second TR band applied proximally. ACT low and TR band protocol.   >>. Discharge today per protocol    The attending interventional cardiologist was present and supervised all critical aspects the procedure.    Findings discussed with Dr Wolf.    See CVIS report for final draft.    Roney Mercer MD   Cardiology Fellow    Andrea Kimball MD   Cardiology Staff

## 2018-06-26 NOTE — TELEPHONE ENCOUNTER
FUTURE VISIT INFORMATION      FUTURE VISIT INFORMATION:    Date: 7/3/18    Time: 4:30 PM    Location: [facility] - Cardiology  REFERRAL INFORMATION:    Referring provider:  Dr. Wolf    Referring providers clinic:  [facility]-Cardiology    Reason for visit/diagnosis  CABG    RECORDS STATUS      NOTES STATUS DETAILS   OFFICE NOTE from referring provider (last 1 yr) Internal 6/13/18   OFFICE NOTE from other specialist N/A    DISCHARGE SUMMARY from hospital Internal 11/7/17, 8/7/17, 5/17/17   DISCHARGE REPORT from the ER Internal 10/4/17   OPERATIVE REPORT N/A    MEDICATION LIST Internal    IMAGING     CTA CORONARY ARTERIES WITH CALCIUM SCORE STUDY (REPORT & IMAGES) N/A    CAROTID ULTRASOUND (REPORT & IMAGES) Internal 6/26/18   CORONARY ANGIOGRAM (REPORT & IMAGES) Internal 6/26/18   CT CHEST (REPORT & IMAGES) N/A    ECHO (OLAF OR TTE) (REPORT & IMAGES) Internal TTE 11/7/17   PFT RESULTS N/A    VEIN MAPPING (REPORT & IMAGES) Internal 6/26/18   ANY OTHER CARDIAC IMAGING   (MRI, CT, ANGIOGRAM) (DISC & REPORT) Internal [place] Lexiscan 11/8/17  Chest XRay 4/30/18, 11/7/17, 10/4/17

## 2018-06-26 NOTE — IP AVS SNAPSHOT
[address] [address]                                       After Visit Summary   6/26/2018    Mariel Ribeiro    MRN: [id number]           After Visit Summary Signature Page     I have received my discharge instructions, and my questions have been answered. I have discussed any challenges I see with this plan with the nurse or doctor.    ..........................................................................................................................................  Patient/Patient Representative Signature      ..........................................................................................................................................  Patient Representative Print Name and Relationship to Patient    ..................................................               ................................................  Date                                            Time    ..........................................................................................................................................  Reviewed by Signature/Title    ...................................................              ..............................................  Date                                                            Time

## 2018-06-26 NOTE — PLAN OF CARE
MD Notification: Right arm has swelling outside markings per cath lab. Right radial pulses +2. Patient c/o moderate pain to right hand. MD at bedside.

## 2018-06-28 ENCOUNTER — PRE VISIT (OUTPATIENT)
Dept: CARDIOLOGY | Facility: CLINIC | Age: 73
End: 2018-06-28

## 2018-06-28 ENCOUNTER — ALLIED HEALTH/NURSE VISIT (OUTPATIENT)
Dept: PHARMACY | Facility: CLINIC | Age: 73
End: 2018-06-28
Payer: COMMERCIAL

## 2018-06-28 DIAGNOSIS — E11.22 TYPE 2 DIABETES MELLITUS WITH CHRONIC KIDNEY DISEASE, WITHOUT LONG-TERM CURRENT USE OF INSULIN, UNSPECIFIED CKD STAGE (H): ICD-10-CM

## 2018-06-28 PROCEDURE — 99606 MTMS BY PHARM EST 15 MIN: CPT | Mod: U4 | Performed by: PHARMACIST

## 2018-06-28 NOTE — PROGRESS NOTES
6-28-18      Mariel-- calls today --had angiogram --showed 4 blockages --needs open heart surgery --wants better blood sugar control.      Date FBG/ 2hours post Lunch/2hours post Dinner /2hours post    6-28-18 [id number]  B-fast is 11am       6-27-18 351 239 before lunch at 3pm.  78 bedtime-felt lightheaded /queasy - ate 2 wintogreen lifesavers --rechecked 108.  Injects nph/r is injected at 7pm.    6-26-18 216-5am angiogram 417 dinner time/380    6-25-18 235/339 234/206 79 bedtime-just went to bed. 11pm typical bedtime.  Admits eating lighter dinners now.    6-24-18 181  196 148/264.     6-23-18 204 239 186/277 bedtime    6-22-18 167/315 226 122/210 bed           She injects 32 u NPH-am and pm  and 14 units R -tid ac.        Plan:      1.  Lets increase NPH dose to 35 units in AM and Pm dose increase to 40 units , then lets increase R dose in am to 16-18 depending on what she wakes up , (we discussed keep bs between 100-200),  Then increase pre-lunch R insulin to 16-18 as well , then lets decrease dinner-- R back to 10-12 units . Strive for bedtime bs closer to 100+.     Goal bs for you is between 100-200. Heart surgery would like you between this level.       Call me in 2 weeks for next dose adjustment.     Karen Hilton [facility].  Medication Therapy Management Provider  [phone]

## 2018-06-28 NOTE — MR AVS SNAPSHOT
After Visit Summary   6/28/2018    Mariel Ribeiro    MRN: [id number]           Patient Information     Date Of Birth          1945        Visit Information        Provider Department      6/28/2018 11:30 AM Karen Hilton RPH [facility]        Today's Diagnoses     Type 2 diabetes mellitus with chronic kidney disease, without long-term current use of insulin, unspecified CKD stage (H)           Follow-ups after your visit        Your next 10 appointments already scheduled     Jul 03, 2018  4:30 PM CDT   (Arrive by 4:15 PM)   New Patient Visit with Joao Mason MD   [facility] ([facility])    [address] [place] [address]  [address] [place] [address] [phone]            Jul 09, 2018  3:30 PM CDT   TELEMEDICINE with Karen Hilton RPH   [facility] ([facility])    [address] [facility] [address] [phone]           Note: this is not an onsite visit; there is no need to come to the facility.            Jul 11, 2018  8:20 AM CDT   (Arrive by 8:05 AM)   RETURN HEART FAILURE with Stephanie Wolf MD   [facility] ([facility])    [address] [place] [address]  [address] [place] [address] [phone]              Who to contact     If you have questions or need follow up information about today's clinic visit or your schedule please contact [facility] directly at [phone].  Normal or non-critical lab and imaging results will be communicated to you by MyChart, letter or phone within 4 business days after the clinic has received the results. If you do not hear from us within 7 days, please contact the clinic through MyChart or phone. If you have a critical or abnormal lab result, we will notify you by phone as soon as possible.  Submit refill requests through [employer] or call your pharmacy and they will forward  the refill request to us. Please allow 3 business days for your refill to be completed.          Additional Information About Your Visit        [employer] Information     [employer] gives you secure access to your electronic health record. If you see a primary care provider, you can also send messages to your care team and make appointments. If you have questions, please call your primary care clinic.  If you do not have a primary care provider, please call [phone] and they will assist you.        Care EveryWhere ID     This is your Care EveryWhere ID. This could be used by other organizations to access your [facility] medical records  [id number]         Blood Pressure from Last 3 Encounters:   06/26/18 140/58   06/19/18 132/78   06/13/18 127/73    Weight from Last 3 Encounters:   06/26/18 302 lb (137 kg)   06/19/18 306 lb 6.4 oz (139 kg)   06/13/18 302 lb (137 kg)              Today, you had the following     No orders found for display         Today's Medication Changes          These changes are accurate as of 6/28/18 12:13 PM.  If you have any questions, ask your nurse or doctor.               These medicines have changed or have updated prescriptions.        Dose/Directions    insulin glargine 100 UNIT/ML injection   Commonly known as:  LANTUS SOLOSTAR   This may have changed:  how much to take   Used for:  Type 2 diabetes mellitus with chronic kidney disease, without long-term current use of insulin, unspecified CKD stage (H)        Dose:  5 Units   Inject 5 Units Subcutaneous At Bedtime   Quantity:  15 mL   Refills:  1                Primary Care Provider Office Phone # Fax #    Rafaela William [phone] [address] [place] [address]        Equal Access to Services     [facility] Center[facility] : Hadclark Martin, dario cox, gopal francis. So [facility] [phone].    ATENCIÓN: Si hilary espdennise, jag a gillespie  disposición servicios gratuitos de asistencia lingüística. Hebert castellanos [phone].    We comply with applicable federal civil rights laws and [place] laws. We do not discriminate on the basis of race, color, national origin, age, disability, sex, sexual orientation, or gender identity.            Thank you!     Thank you for choosing [facility]  for your care. Our goal is always to provide you with excellent care. Hearing back from our patients is one way we can continue to improve our services. Please take a few minutes to complete the written survey that you may receive in the mail after your visit with us. Thank you!             Your Updated Medication List - Protect others around you: Learn how to safely use, store and throw away your medicines at www.disposemymeds.org.          This list is accurate as of 6/28/18 12:13 PM.  Always use your most recent med list.                   Brand Name Dispense Instructions for use Diagnosis    albuterol 108 (90 Base) MCG/ACT Inhaler    PROAIR HFA    1 Inhaler    INHALE 1 TO 2 PUFFS EVERY 4 TO 6 HOURS AS NEEDED    Chronic obstructive pulmonary disease with acute exacerbation (H)       aspirin 325 MG EC tablet     30 tablet    Take 1 tablet by mouth daily.        atorvastatin 40 MG tablet    LIPITOR    90 tablet    TAKE 1 TABLET(40 MG) BY MOUTH DAILY    Hyperlipidemia with target LDL less than 70       B-D U/F 31G X 5 MM   Generic drug:  insulin pen needle     100 each    USE FOR INSULIN INJECTION    Type 2 diabetes mellitus with diabetic polyneuropathy, without long-term current use of insulin (H)       blood glucose monitoring test strip    no brand specified    200 each    1 strip by In Vitro route 2 times daily Strips per patient's preference    Type 2 diabetes mellitus with diabetic polyneuropathy, without long-term current use of insulin (H)       cyclobenzaprine 10 MG tablet    FLEXERIL    90 tablet    TAKE 1 TABLET(10 MG) BY MOUTH THREE TIMES  DAILY AS NEEDED FOR MUSCLE SPASMS    Acute right-sided thoracic back pain       cycloSPORINE 0.05 % ophthalmic emulsion    RESTASIS    180 each    Place 1 drop into both eyes 2 times daily    Keratitis sicca, bilateral       EPINEPHrine 0.3 MG/0.3ML injection 2-pack    EPIPEN/ADRENACLICK/or ANY BX GENERIC EQUIV    0.6 mL    Inject 0.3 mLs (0.3 mg) into the muscle once as needed for anaphylaxis    Allergic reaction, subsequent encounter       escitalopram 20 MG tablet    LEXAPRO    90 tablet    Take 1 tablet (20 mg) by mouth every morning    Major depressive disorder, single episode, in partial remission (H)       folic acid 800 MCG Tabs      Take 2 tablets by mouth daily 2 (400 MCG) tablets        furosemide 40 MG tablet    LASIX    120 tablet    TAKE 2 TABLETS(80 MG) BY MOUTH TWICE DAILY    Chronic diastolic heart failure (H)       glimepiride 4 MG tablet    AMARYL    180 tablet    TAKE 1 TABLET BY MOUTH TWICE DAILY(WITH MEALS)    Type 2 diabetes mellitus with chronic kidney disease, without long-term current use of insulin, unspecified CKD stage (H)       GUAIFENESIN  MG Tbcr     30    Take 600 mg by mouth twice daily        insulin glargine 100 UNIT/ML injection    LANTUS SOLOSTAR    15 mL    Inject 5 Units Subcutaneous At Bedtime    Type 2 diabetes mellitus with chronic kidney disease, without long-term current use of insulin, unspecified CKD stage (H)       insulin  UNIT/ML injection    NovoLIN N VIAL    30 mL    25-35 units before breakfast  25-35 units before dinner    Type 2 diabetes mellitus with chronic kidney disease, without long-term current use of insulin, unspecified CKD stage (H)       insulin regular 100 UNIT/ML injection    NovoLIN R VIAL    20 mL    10-20 units before breakfast, 10-20 units before lunch, 10-20 units before dinner    Type 2 diabetes mellitus with chronic kidney disease, without long-term current use of insulin, unspecified CKD stage (H)       insulin syringes  (disposable) U-100 1 ML Misc     100 each    1 each 5 times daily    Type 2 diabetes mellitus with chronic kidney disease, without long-term current use of insulin, unspecified CKD stage (H)       levothyroxine 150 MCG tablet    SYNTHROID/LEVOTHROID    90 tablet    Take 1 tablet (150 mcg) by mouth daily    Hypothyroidism, unspecified type       [employer] FINEPOINT LANCETS Misc     100 each    Use to test blood sugars 2 times daily or as directed.    Type 2 diabetes mellitus with diabetic polyneuropathy, without long-term current use of insulin (H)       LYRICA 100 MG capsule   Generic drug:  pregabalin     180 capsule    TAKE ONE CAPSULE BY MOUTH TWICE DAILY    Myalgia       metFORMIN 500 MG 24 hr tablet    GLUCOPHAGE-XR    360 tablet    TAKE 2 TABLETS BY MOUTH AFTER BREAKFAST AND SUPPER    Type 2 diabetes mellitus with diabetic polyneuropathy, without long-term current use of insulin (H)       Miconazole Nitrate 2 % Powd     100 g    Apply to rash on chest three times daily.  Continue for one week after the rash has resolved.    Intertrigo       niacin 500 MG CR tablet    NIASPAN    180 tablet    TAKE 1 TABLET(500 MG) BY MOUTH TWICE DAILY    Hyperlipidemia with target LDL less than 70       nitroGLYcerin 0.4 MG sublingual tablet    NITROSTAT    25 tablet    Place 1 tablet (0.4 mg) under the tongue every 5 minutes as needed for chest pain As needed for chest pain.    Atherosclerosis of native coronary artery of native heart, angina presence unspecified       ONE TOUCH ULTRA (DEVICES) Misc     1    test blood sugar BID    Type II or unspecified type diabetes mellitus without mention of complication, not stated as uncontrolled       potassium chloride SA 10 MEQ CR tablet    K-DUR/KLOR-CON M    360 tablet    TAKE 2 TABLETS BY MOUTH TWICE DAILY    Hypokalemia, Chronic diastolic heart failure (H)       * predniSONE 20 MG tablet    DELTASONE    10 tablet    Take 1 tablet (20 mg) by mouth 2 times daily    COPD exacerbation  (H)       * predniSONE 20 MG tablet    DELTASONE    3 tablet    Take 60 MG the night before your coronary angiogram.    Allergic reaction to contrast dye       prochlorperazine 5 MG tablet    COMPAZINE    10 tablet    Take 1-2 tablets (5-10 mg) by mouth every 6 hours as needed (Headache)        senna-docusate 8.6-50 MG per tablet    SENOKOT-S;PERICOLACE    60 tablet    Take 1-2 tablets by mouth 2 times daily as needed for constipation    Constipation       sitagliptin 50 MG tablet    JANUVIA    90 tablet    Take 1 tablet (50 mg) by mouth daily    Type 2 diabetes mellitus with chronic kidney disease, without long-term current use of insulin, unspecified CKD stage (H)       tiZANidine 4 MG tablet    ZANAFLEX    90 tablet    TAKE 1 TO 2 TABLETS(4 TO 8 MG) BY MOUTH THREE TIMES DAILY AS NEEDED    Muscle spasm       traZODone 50 MG tablet    DESYREL    90 tablet    Take 3 tablets (150 mg) by mouth At Bedtime    Insomnia, unspecified type       ULORIC 40 MG Tabs tablet   Generic drug:  febuxostat     90 tablet    TAKE 1 TABLET(40 MG) BY MOUTH EVERY EVENING    Hyperuricemia       vitamin D3 5000 UNIT/ML Liqd      Take 10,000 Units by mouth daily        * Notice:  This list has 2 medication(s) that are the same as other medications prescribed for you. Read the directions carefully, and ask your doctor or other care provider to review them with you.

## 2018-06-29 ENCOUNTER — HOSPITAL ENCOUNTER (INPATIENT)
Facility: CLINIC | Age: 73
LOS: 14 days | Discharge: NURSING, REHAB OR LONG-TERM CARE | DRG: 234 | End: 2018-07-13
Attending: EMERGENCY MEDICINE | Admitting: INTERNAL MEDICINE
Payer: MEDICARE

## 2018-06-29 ENCOUNTER — APPOINTMENT (OUTPATIENT)
Dept: GENERAL RADIOLOGY | Facility: CLINIC | Age: 73
DRG: 234 | End: 2018-06-29
Attending: EMERGENCY MEDICINE
Payer: MEDICARE

## 2018-06-29 ENCOUNTER — TRANSFERRED RECORDS (OUTPATIENT)
Dept: HEALTH INFORMATION MANAGEMENT | Facility: CLINIC | Age: 73
End: 2018-06-29

## 2018-06-29 ENCOUNTER — TELEPHONE (OUTPATIENT)
Dept: CALL CENTER | Age: 73
End: 2018-06-29

## 2018-06-29 DIAGNOSIS — J44.9 CHRONIC OBSTRUCTIVE PULMONARY DISEASE, UNSPECIFIED COPD TYPE (H): ICD-10-CM

## 2018-06-29 DIAGNOSIS — M62.838 MUSCLE SPASM: ICD-10-CM

## 2018-06-29 DIAGNOSIS — E87.6 HYPOKALEMIA: ICD-10-CM

## 2018-06-29 DIAGNOSIS — E11.22 TYPE 2 DIABETES MELLITUS WITH CHRONIC KIDNEY DISEASE, WITHOUT LONG-TERM CURRENT USE OF INSULIN, UNSPECIFIED CKD STAGE (H): ICD-10-CM

## 2018-06-29 DIAGNOSIS — T83.511A URINARY TRACT INFECTION ASSOCIATED WITH INDWELLING URETHRAL CATHETER, INITIAL ENCOUNTER (H): ICD-10-CM

## 2018-06-29 DIAGNOSIS — G89.18 ACUTE POST-OPERATIVE PAIN: ICD-10-CM

## 2018-06-29 DIAGNOSIS — N39.0 URINARY TRACT INFECTION ASSOCIATED WITH INDWELLING URETHRAL CATHETER, INITIAL ENCOUNTER (H): ICD-10-CM

## 2018-06-29 DIAGNOSIS — Z95.1 S/P CABG X 3: Primary | ICD-10-CM

## 2018-06-29 DIAGNOSIS — R07.9 ACUTE CHEST PAIN: ICD-10-CM

## 2018-06-29 DIAGNOSIS — I50.32 CHRONIC DIASTOLIC HEART FAILURE (H): ICD-10-CM

## 2018-06-29 DIAGNOSIS — I10 BENIGN ESSENTIAL HYPERTENSION: ICD-10-CM

## 2018-06-29 PROBLEM — I20.89 ANGINA AT REST (H): Status: ACTIVE | Noted: 2018-06-29

## 2018-06-29 LAB
ALBUMIN SERPL-MCNC: 3.4 G/DL (ref 3.4–5)
ALP SERPL-CCNC: 108 U/L (ref 40–150)
ALT SERPL W P-5'-P-CCNC: 28 U/L (ref 0–50)
ANION GAP SERPL CALCULATED.3IONS-SCNC: 9 MMOL/L (ref 3–14)
AST SERPL W P-5'-P-CCNC: 22 U/L (ref 0–45)
BASOPHILS # BLD AUTO: 0 10E9/L (ref 0–0.2)
BASOPHILS NFR BLD AUTO: 0.2 %
BILIRUB SERPL-MCNC: 0.9 MG/DL (ref 0.2–1.3)
BUN SERPL-MCNC: 21 MG/DL (ref 7–30)
CALCIUM SERPL-MCNC: 9.3 MG/DL (ref 8.5–10.1)
CHLORIDE SERPL-SCNC: 103 MMOL/L (ref 94–109)
CO2 SERPL-SCNC: 30 MMOL/L (ref 20–32)
CREAT SERPL-MCNC: 1.29 MG/DL (ref 0.52–1.04)
DIFFERENTIAL METHOD BLD: ABNORMAL
EOSINOPHIL # BLD AUTO: 0.1 10E9/L (ref 0–0.7)
EOSINOPHIL NFR BLD AUTO: 1.3 %
ERYTHROCYTE [DISTWIDTH] IN BLOOD BY AUTOMATED COUNT: 14.3 % (ref 10–15)
GFR SERPL CREATININE-BSD FRML MDRD: 41 ML/MIN/1.7M2
GLUCOSE BLDC GLUCOMTR-MCNC: 113 MG/DL (ref 70–99)
GLUCOSE BLDC GLUCOMTR-MCNC: 194 MG/DL (ref 70–99)
GLUCOSE SERPL-MCNC: 201 MG/DL (ref 70–99)
HCT VFR BLD AUTO: 38.3 % (ref 35–47)
HGB BLD-MCNC: 12.7 G/DL (ref 11.7–15.7)
IMM GRANULOCYTES # BLD: 0 10E9/L (ref 0–0.4)
IMM GRANULOCYTES NFR BLD: 0.1 %
INR PPP: 1.01 (ref 0.86–1.14)
LYMPHOCYTES # BLD AUTO: 1.6 10E9/L (ref 0.8–5.3)
LYMPHOCYTES NFR BLD AUTO: 19 %
MCH RBC QN AUTO: 28.5 PG (ref 26.5–33)
MCHC RBC AUTO-ENTMCNC: 33.2 G/DL (ref 31.5–36.5)
MCV RBC AUTO: 86 FL (ref 78–100)
MONOCYTES # BLD AUTO: 0.5 10E9/L (ref 0–1.3)
MONOCYTES NFR BLD AUTO: 6.3 %
NEUTROPHILS # BLD AUTO: 6.1 10E9/L (ref 1.6–8.3)
NEUTROPHILS NFR BLD AUTO: 73.1 %
NRBC # BLD AUTO: 0 10*3/UL
NRBC BLD AUTO-RTO: 0 /100
NT-PROBNP SERPL-MCNC: 355 PG/ML (ref 0–900)
PLATELET # BLD AUTO: 142 10E9/L (ref 150–450)
POTASSIUM SERPL-SCNC: 3.1 MMOL/L (ref 3.4–5.3)
PROT SERPL-MCNC: 6.5 G/DL (ref 6.8–8.8)
RBC # BLD AUTO: 4.45 10E12/L (ref 3.8–5.2)
SODIUM SERPL-SCNC: 142 MMOL/L (ref 133–144)
TROPONIN I SERPL-MCNC: <0.015 UG/L (ref 0–0.04)
WBC # BLD AUTO: 8.3 10E9/L (ref 4–11)

## 2018-06-29 PROCEDURE — 00000146 ZZHCL STATISTIC GLUCOSE BY METER IP

## 2018-06-29 PROCEDURE — 99285 EMERGENCY DEPT VISIT HI MDM: CPT | Mod: 25 | Performed by: EMERGENCY MEDICINE

## 2018-06-29 PROCEDURE — 21400006 ZZH R&B CCU INTERMEDIATE UMMC

## 2018-06-29 PROCEDURE — 5A1221Z PERFORMANCE OF CARDIAC OUTPUT, CONTINUOUS: ICD-10-PCS | Performed by: THORACIC SURGERY (CARDIOTHORACIC VASCULAR SURGERY)

## 2018-06-29 PROCEDURE — 96366 THER/PROPH/DIAG IV INF ADDON: CPT

## 2018-06-29 PROCEDURE — 25000128 H RX IP 250 OP 636: Performed by: STUDENT IN AN ORGANIZED HEALTH CARE EDUCATION/TRAINING PROGRAM

## 2018-06-29 PROCEDURE — 93010 ELECTROCARDIOGRAM REPORT: CPT | Mod: Z6 | Performed by: EMERGENCY MEDICINE

## 2018-06-29 PROCEDURE — A9270 NON-COVERED ITEM OR SERVICE: HCPCS | Mod: GY | Performed by: STUDENT IN AN ORGANIZED HEALTH CARE EDUCATION/TRAINING PROGRAM

## 2018-06-29 PROCEDURE — 85025 COMPLETE CBC W/AUTO DIFF WBC: CPT | Performed by: EMERGENCY MEDICINE

## 2018-06-29 PROCEDURE — 4A1239Z MONITORING OF CARDIAC OUTPUT, PERCUTANEOUS APPROACH: ICD-10-PCS | Performed by: THORACIC SURGERY (CARDIOTHORACIC VASCULAR SURGERY)

## 2018-06-29 PROCEDURE — 93005 ELECTROCARDIOGRAM TRACING: CPT

## 2018-06-29 PROCEDURE — 25000132 ZZH RX MED GY IP 250 OP 250 PS 637: Mod: GY | Performed by: STUDENT IN AN ORGANIZED HEALTH CARE EDUCATION/TRAINING PROGRAM

## 2018-06-29 PROCEDURE — 99223 1ST HOSP IP/OBS HIGH 75: CPT | Mod: 25 | Performed by: INTERNAL MEDICINE

## 2018-06-29 PROCEDURE — 71045 X-RAY EXAM CHEST 1 VIEW: CPT

## 2018-06-29 PROCEDURE — 85610 PROTHROMBIN TIME: CPT | Performed by: EMERGENCY MEDICINE

## 2018-06-29 PROCEDURE — 99285 EMERGENCY DEPT VISIT HI MDM: CPT | Mod: 25

## 2018-06-29 PROCEDURE — 02HP32Z INSERTION OF MONITORING DEVICE INTO PULMONARY TRUNK, PERCUTANEOUS APPROACH: ICD-10-PCS | Performed by: THORACIC SURGERY (CARDIOTHORACIC VASCULAR SURGERY)

## 2018-06-29 PROCEDURE — 96365 THER/PROPH/DIAG IV INF INIT: CPT

## 2018-06-29 PROCEDURE — 83880 ASSAY OF NATRIURETIC PEPTIDE: CPT | Performed by: EMERGENCY MEDICINE

## 2018-06-29 PROCEDURE — 80053 COMPREHEN METABOLIC PANEL: CPT | Performed by: EMERGENCY MEDICINE

## 2018-06-29 PROCEDURE — 021109W BYPASS CORONARY ARTERY, TWO ARTERIES FROM AORTA WITH AUTOLOGOUS VENOUS TISSUE, OPEN APPROACH: ICD-10-PCS | Performed by: THORACIC SURGERY (CARDIOTHORACIC VASCULAR SURGERY)

## 2018-06-29 PROCEDURE — 84484 ASSAY OF TROPONIN QUANT: CPT | Performed by: EMERGENCY MEDICINE

## 2018-06-29 PROCEDURE — 06BP4ZZ EXCISION OF RIGHT SAPHENOUS VEIN, PERCUTANEOUS ENDOSCOPIC APPROACH: ICD-10-PCS | Performed by: THORACIC SURGERY (CARDIOTHORACIC VASCULAR SURGERY)

## 2018-06-29 PROCEDURE — 25000128 H RX IP 250 OP 636: Performed by: EMERGENCY MEDICINE

## 2018-06-29 PROCEDURE — 02100Z9 BYPASS CORONARY ARTERY, ONE ARTERY FROM LEFT INTERNAL MAMMARY, OPEN APPROACH: ICD-10-PCS | Performed by: THORACIC SURGERY (CARDIOTHORACIC VASCULAR SURGERY)

## 2018-06-29 PROCEDURE — 06BQ4ZZ EXCISION OF LEFT SAPHENOUS VEIN, PERCUTANEOUS ENDOSCOPIC APPROACH: ICD-10-PCS | Performed by: THORACIC SURGERY (CARDIOTHORACIC VASCULAR SURGERY)

## 2018-06-29 PROCEDURE — 4A133B3 MONITORING OF ARTERIAL PRESSURE, PULMONARY, PERCUTANEOUS APPROACH: ICD-10-PCS | Performed by: THORACIC SURGERY (CARDIOTHORACIC VASCULAR SURGERY)

## 2018-06-29 RX ORDER — FUROSEMIDE 20 MG
40 TABLET ORAL
Status: DISCONTINUED | OUTPATIENT
Start: 2018-06-30 | End: 2018-07-03

## 2018-06-29 RX ORDER — NICOTINE POLACRILEX 4 MG
15-30 LOZENGE BUCCAL
Status: DISCONTINUED | OUTPATIENT
Start: 2018-06-29 | End: 2018-07-02

## 2018-06-29 RX ORDER — PROCHLORPERAZINE MALEATE 5 MG
5 TABLET ORAL EVERY 6 HOURS PRN
Status: DISCONTINUED | OUTPATIENT
Start: 2018-06-29 | End: 2018-07-02

## 2018-06-29 RX ORDER — LIDOCAINE 40 MG/G
CREAM TOPICAL
Status: DISCONTINUED | OUTPATIENT
Start: 2018-06-29 | End: 2018-07-02

## 2018-06-29 RX ORDER — NALOXONE HYDROCHLORIDE 0.4 MG/ML
.1-.4 INJECTION, SOLUTION INTRAMUSCULAR; INTRAVENOUS; SUBCUTANEOUS
Status: DISCONTINUED | OUTPATIENT
Start: 2018-06-29 | End: 2018-07-02

## 2018-06-29 RX ORDER — HYDRALAZINE HYDROCHLORIDE 20 MG/ML
20 INJECTION INTRAMUSCULAR; INTRAVENOUS EVERY 6 HOURS PRN
Status: DISCONTINUED | OUTPATIENT
Start: 2018-06-29 | End: 2018-07-02

## 2018-06-29 RX ORDER — POTASSIUM CHLORIDE 7.45 MG/ML
10 INJECTION INTRAVENOUS
Status: DISCONTINUED | OUTPATIENT
Start: 2018-06-29 | End: 2018-07-02

## 2018-06-29 RX ORDER — GUAIFENESIN 600 MG/1
600 TABLET, EXTENDED RELEASE ORAL 2 TIMES DAILY
Status: DISCONTINUED | OUTPATIENT
Start: 2018-06-29 | End: 2018-07-03

## 2018-06-29 RX ORDER — ESCITALOPRAM OXALATE 10 MG/1
20 TABLET ORAL EVERY MORNING
Status: DISCONTINUED | OUTPATIENT
Start: 2018-06-30 | End: 2018-07-13 | Stop reason: HOSPADM

## 2018-06-29 RX ORDER — NIACIN 500 MG
500 TABLET ORAL 2 TIMES DAILY WITH MEALS
Status: DISCONTINUED | OUTPATIENT
Start: 2018-06-30 | End: 2018-07-13 | Stop reason: HOSPADM

## 2018-06-29 RX ORDER — POTASSIUM CL/LIDO/0.9 % NACL 10MEQ/0.1L
10 INTRAVENOUS SOLUTION, PIGGYBACK (ML) INTRAVENOUS
Status: DISCONTINUED | OUTPATIENT
Start: 2018-06-29 | End: 2018-07-02

## 2018-06-29 RX ORDER — NITROGLYCERIN 20 MG/100ML
0.2 INJECTION INTRAVENOUS CONTINUOUS
Status: DISCONTINUED | OUTPATIENT
Start: 2018-06-29 | End: 2018-06-30

## 2018-06-29 RX ORDER — CYCLOBENZAPRINE HCL 5 MG
10 TABLET ORAL 3 TIMES DAILY PRN
Status: DISCONTINUED | OUTPATIENT
Start: 2018-06-29 | End: 2018-07-13 | Stop reason: HOSPADM

## 2018-06-29 RX ORDER — MORPHINE SULFATE 2 MG/ML
2 INJECTION, SOLUTION INTRAMUSCULAR; INTRAVENOUS
Status: DISCONTINUED | OUTPATIENT
Start: 2018-06-29 | End: 2018-06-29

## 2018-06-29 RX ORDER — POTASSIUM CHLORIDE 29.8 MG/ML
20 INJECTION INTRAVENOUS
Status: DISCONTINUED | OUTPATIENT
Start: 2018-06-29 | End: 2018-07-02

## 2018-06-29 RX ORDER — DEXTROSE MONOHYDRATE 25 G/50ML
25-50 INJECTION, SOLUTION INTRAVENOUS
Status: DISCONTINUED | OUTPATIENT
Start: 2018-06-29 | End: 2018-07-02

## 2018-06-29 RX ORDER — NITROGLYCERIN 0.4 MG/1
0.4 TABLET SUBLINGUAL EVERY 5 MIN PRN
Status: DISCONTINUED | OUTPATIENT
Start: 2018-06-29 | End: 2018-06-29

## 2018-06-29 RX ORDER — ALBUTEROL SULFATE 90 UG/1
2 AEROSOL, METERED RESPIRATORY (INHALATION) EVERY 4 HOURS PRN
Status: DISCONTINUED | OUTPATIENT
Start: 2018-06-29 | End: 2018-07-10

## 2018-06-29 RX ORDER — ALUMINA, MAGNESIA, AND SIMETHICONE 2400; 2400; 240 MG/30ML; MG/30ML; MG/30ML
30 SUSPENSION ORAL EVERY 4 HOURS PRN
Status: DISCONTINUED | OUTPATIENT
Start: 2018-06-29 | End: 2018-07-13 | Stop reason: HOSPADM

## 2018-06-29 RX ORDER — ATORVASTATIN CALCIUM 40 MG/1
40 TABLET, FILM COATED ORAL DAILY
Status: DISCONTINUED | OUTPATIENT
Start: 2018-06-30 | End: 2018-07-13 | Stop reason: HOSPADM

## 2018-06-29 RX ORDER — POTASSIUM CHLORIDE 1.5 G/1.58G
20-40 POWDER, FOR SOLUTION ORAL
Status: DISCONTINUED | OUTPATIENT
Start: 2018-06-29 | End: 2018-07-02

## 2018-06-29 RX ORDER — ACETAMINOPHEN 650 MG/1
650 SUPPOSITORY RECTAL EVERY 4 HOURS PRN
Status: DISCONTINUED | OUTPATIENT
Start: 2018-06-29 | End: 2018-07-02

## 2018-06-29 RX ORDER — HEPARIN SODIUM 5000 [USP'U]/.5ML
5000 INJECTION, SOLUTION INTRAVENOUS; SUBCUTANEOUS EVERY 8 HOURS
Status: DISCONTINUED | OUTPATIENT
Start: 2018-06-29 | End: 2018-07-04

## 2018-06-29 RX ORDER — MAGNESIUM SULFATE HEPTAHYDRATE 40 MG/ML
4 INJECTION, SOLUTION INTRAVENOUS EVERY 4 HOURS PRN
Status: DISCONTINUED | OUTPATIENT
Start: 2018-06-29 | End: 2018-07-02

## 2018-06-29 RX ORDER — PREGABALIN 100 MG/1
100 CAPSULE ORAL 2 TIMES DAILY
Status: DISCONTINUED | OUTPATIENT
Start: 2018-06-29 | End: 2018-07-03

## 2018-06-29 RX ORDER — ONDANSETRON 2 MG/ML
4 INJECTION INTRAMUSCULAR; INTRAVENOUS EVERY 30 MIN PRN
Status: DISCONTINUED | OUTPATIENT
Start: 2018-06-29 | End: 2018-07-02

## 2018-06-29 RX ORDER — ACETAMINOPHEN 325 MG/1
650 TABLET ORAL EVERY 4 HOURS PRN
Status: DISCONTINUED | OUTPATIENT
Start: 2018-06-29 | End: 2018-07-02

## 2018-06-29 RX ORDER — LEVOTHYROXINE SODIUM 150 UG/1
150 TABLET ORAL DAILY
Status: DISCONTINUED | OUTPATIENT
Start: 2018-06-30 | End: 2018-07-13 | Stop reason: HOSPADM

## 2018-06-29 RX ORDER — NITROGLYCERIN 20 MG/100ML
.07-1.46 INJECTION INTRAVENOUS CONTINUOUS
Status: DISCONTINUED | OUTPATIENT
Start: 2018-06-29 | End: 2018-06-29

## 2018-06-29 RX ORDER — POTASSIUM CHLORIDE 750 MG/1
20-40 TABLET, EXTENDED RELEASE ORAL
Status: DISCONTINUED | OUTPATIENT
Start: 2018-06-29 | End: 2018-07-02

## 2018-06-29 RX ADMIN — GUAIFENESIN 600 MG: 600 TABLET, EXTENDED RELEASE ORAL at 23:08

## 2018-06-29 RX ADMIN — NITROGLYCERIN 0.2 MCG/KG/MIN: 20 INJECTION INTRAVENOUS at 23:07

## 2018-06-29 RX ADMIN — PREGABALIN 100 MG: 100 CAPSULE ORAL at 23:08

## 2018-06-29 RX ADMIN — NITROGLYCERIN 0.07 MCG/KG/MIN: 20 INJECTION INTRAVENOUS at 18:16

## 2018-06-29 RX ADMIN — POTASSIUM CHLORIDE 40 MEQ: 750 TABLET, EXTENDED RELEASE ORAL at 23:15

## 2018-06-29 RX ADMIN — Medication 150 MG: at 23:07

## 2018-06-29 RX ADMIN — CYCLOBENZAPRINE HYDROCHLORIDE 10 MG: 5 TABLET, FILM COATED ORAL at 23:07

## 2018-06-29 RX ADMIN — Medication 500 MG: at 23:16

## 2018-06-29 RX ADMIN — HEPARIN SODIUM 5000 UNITS: 5000 INJECTION, SOLUTION INTRAVENOUS; SUBCUTANEOUS at 23:07

## 2018-06-29 NOTE — IP AVS SNAPSHOT
[address] [address]    Phone:  [phone]                                       After Visit Summary   6/29/2018    Mariel Ribeiro    MRN: [id number]           After Visit Summary Signature Page     I have received my discharge instructions, and my questions have been answered. I have discussed any challenges I see with this plan with the nurse or doctor.    ..........................................................................................................................................  Patient/Patient Representative Signature      ..........................................................................................................................................  Patient Representative Print Name and Relationship to Patient    ..................................................               ................................................  Date                                            Time    ..........................................................................................................................................  Reviewed by Signature/Title    ...................................................              ..............................................  Date                                                            Time

## 2018-06-29 NOTE — ED TRIAGE NOTES
Pt presents ambulatory to triage with c/o chest pain that began last night. The pain was unrelieved by nitro. Hx of 4 blockages found on recent angiogram Tuesday.
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
## 2018-06-29 NOTE — IP AVS SNAPSHOT
MRN:[id number]                      After Visit Summary   6/29/2018    Mariel Ribeiro    MRN: [id number]           Thank you!     Thank you for choosing [facility] for your care. Our goal is always to provide you with excellent care. Hearing back from our patients is one way we can continue to improve our services. Please take a few minutes to complete the written survey that you may receive in the mail after you visit with us. Thank you!        Patient Information     Date Of Birth          1945        About your hospital stay     You were admitted on:  June 29, 2018 You last received care in the:  [address] [facility]    You were discharged on:  July 13, 2018        Reason for your hospital stay       You were admitted with unstable angina and underwent 3 vessel coronary artery bypass surgery.                  Who to Call     For medical emergencies, please call 911.  For non-urgent questions about your medical care, please call your primary care provider or clinic, [phone]  For questions related to your surgery, please call your surgery clinic        Attending Provider     Provider Specialty    Mitchel Carvajal MD Emergency Medicine    [facility]    Joao Mason MD Cardiology       Primary Care Provider Office Phone # Fax #    Rafaela William [phone]      After Care Instructions     Activity - Up with assistive device           Advance Diet as Tolerated       Follow this diet upon discharge: Orders Placed This Encounter      Snacks/Supplements Adult: Boost Glucose Control; Between Meals      Combination Diet Regular Diet Adult; 0698-6532 Calories: Moderate Consistent CHO (4-6 CHO units/meal)            Cardiac sternal precautions           Daily weights       Call Provider for weight gain of more than 2 pounds per day or 5 pounds per week.            General info for SNF       Length of Stay Estimate: Short Term Care:  Estimated # of Days <30  Condition at Discharge: Improving  Level of care:skilled   Rehabilitation Potential: Good  Admission H&P remains valid and up-to-date: Yes  Recent Chemotherapy: N/A  Use Nursing Home Standing Orders: Yes            Glucose monitor nursing POCT       Monitor glucose ac, hs and 0200            Intake and output       Every shift            Mantoux instructions       Give two-step Mantoux (PPD) Per Facility Policy Yes            Oxygen - Nasal cannula       1-2 Lpm by nasal cannula to keep O2 sats 92% or greater.            Wound care       Site:   Bilateral LE vein harvest sites, staples in 2 out of three incisions  Instructions:  Keep dry, change dressing prn if wet. Staples to be removed in clinic on July 20th            Wound care (specify)       You have dissolvable sutures under your skin that do not need to be removed.  Pat wound dressing dry after showers.  Skin glue will eventually fall off in 1-2 weeks.     Keep wound clean and dry, showers are okay after discharge, but don't let spray hit directly on incision. No baths or swimming for 1 month.  Clean wounds twice a day for 2 weeks with microklenz spray if available. Cover chest tube sites with gauze until they stop draining, then leave open.                  Follow-up Appointments     Follow Up and recommended labs and tests       Follow up with your primary care provider, Rafaela William, within 3-5 days of discharge from rehab. to evaluate after surgery and for hospital follow- up. The following labs/tests are recommended: CBC, BMP.    Follow up with cardiothoracic surgery on July 20th, 2018 at 2pm. This follow-up appointment will be listed on your after visit summary paperwork when you leave the hospital. If you have questions, call [phone].  Follow up with your primary cardiologist in 4-6 weeks from surgery to evaluate medication change, to evaluate treatment change, to evaluate after surgery and for hospital follow- up.  The following labs/tests are recommended: echo.                  Your next 10 appointments already scheduled     Jul 20, 2018  2:00 PM CDT   (Arrive by 1:45 PM)   Return Visit with [facility] CVTS   [facility] ([facility])    [address] [place] [address]  [address] [place] [id number] [phone]              Additional Services     Occupational Therapy Adult Consult       Evaluate and treat as clinically indicated.    Reason:  S/p CABG            Physical Therapy Adult Consult       Evaluate and treat as clinically indicated.    Reason:  S/p CABG                  Further instructions from your care team       Diabetes follow-up: Call [facility] coordinator: [phone], to schedule your outpatient diabetes appointment.  Recommend you be seen 3-4 weeks from discharge.      [facility]      AFTER YOU GO HOME FROM YOUR HEART SURGERY    You had a full sternotomy, so avoid lifting anything greater than ten pounds for 6 weeks after surgery and then less than 20 pounds for an additional 6 weeks.    No driving for 4 weeks after surgery or while on pain medication.    Avoid strenuous activities such as bowling, vacuuming, raking, shoveling, golf or tennis for 12 weeks after your surgery. It is okay to resume sex if you feel comfortable in doing so. You may have to try different positions with your partner.     Splint your chest incision by hugging a pillow or bringing your arms across your chest when coughing or sneezing. Avoid pushing off with your arms when getting up for the first month if you have had your sternum opened.    Shower or wash your incisions daily with soap and water (or as instructed), pat dry. Keep wound clean and dry, showers are okay after discharge, but don't let spray hit directly on incision. No baths or swimming for 1 month.  Clean wounds twice a day for 2 weeks with microklenz spray if available or just soap and water. Cover  chest tube sites with gauze until they stop draining, then leave open. It is not abnormal for chest tube sites to drain yellowish/clear fluid for up to 2-3 weeks after surgery.   Watch for signs of infection: increased redness, tenderness, warmth or any drainage that appears infected (pus like) or is persistent.  Also a temperature > 100.5 F or chills. Call your surgeon or primary care provider's office immediately. Remove any skin glue left on incisions after 10-14 days. This will not affect your incision and can speed up healing.    Exercise is very important in your recovery. Please follow the guidelines set up for you in your cardiac rehab classes at the hospital. If outpatient cardiac rehab was ordered for you, we highly recommend you participate. If you have problems arranging your cardiac rehab, please call [phone].     Avoid sitting for prolonged periods of time, try to walk every hour during the day. If you have a leg incision, elevate your leg often when you are not walking.    Check your weight when you get home from the hospital and continue to check it daily through your recovery for at least a month. If you notice a weight gain of 2-3 pounds in a week, notify your primary care physician, cardiologist or surgeon.    Bowel activity may be slow after surgery. If necessary, you may take an over the counter laxative such as Milk of Magnesia or Miralax. You may have stool softeners prescribed (docusate sodium, Senokot). We recommend using stool softeners while using narcotics for pain (oxycodone/percocet, hydrocodone/vicodin).      Wean OFF of narcotics (oxycodone, dilaudid, hydrocodone) as soon as possible. You should continue taking acetaminophen as long as you have any surgical pain as the first choice for pain control.  Add narcotics as necessary for pain to be tolerable     DENTAL VISITS AFTER SURGERY  If you have had your heart valve repaired or replaced, we do not recommend having any dental work  "done for 6 months and you will need to take an antibiotic prior to dental visits from now on.  Please notify your dentist before any procedure for the proper treatment needed. The antibiotic is taken by mouth one hour prior to visit. This includes routine cleanings.  You may hear your mechanical valve \"clicking,\" this is normal and not a sign of something wrong.    DO NOT SMOKE.  IF YOU NEED HELP QUITTING, PLEASE TALK WITH YOUR CARDIOLOGIST OR PRIMARY DOCTOR.    If you are on a blood thinner, follow the instructions you were given in the hospital and DO NOT SKIP this medication. Try and take it the same time everyday. Your primary care physician or coumadin clinic will manage the dosing.     REGARDING PRESCRIPTION REFILLS.  If you need a refill on your pain medication contact us to discuss your pain and a possible one time refill.   All other medications will be adjusted, discontinued and re-filled by your primary care physician and/or your cardiologist as they were prior to your surgery. We have given you enough for one to three month with possibly one refill.    POST-OPERATIVE CLINIC VISITS  You have a follow up visit with CVTS Surgery Advance Care Practitioners on July 20th, 2018 at 2 pm at the [facility].  You will then return to the care of your primary physician and your cardiologist.   It is important to call for an appointment to see your cardiologist in 4-6 weeks after surgery and your primary care physician in 2-4 weeks after surgery.   If there is a need to return to see CT Surgery please call our  at [phone].    SURGICAL QUESTIONS  Please call Cynthia Donahue with any surgical recovery and medication questions, her phone number is listed below.  She can assist you with your needs and contact other surgery care team members as indicated.    On weekends or after hours, please call [phone] and ask the  to   page the Cardiothoracic Surgery fellow on call.  " "    Thank you,    Your Cardiothoracic Surgery Team  Cynthia Donahue RN Care Coordinator-  [phone]   Rafaela VILLASENOR                     Pending Results     Date and Time Order Name Status Description    7/11/2018 1330 Urine Culture Aerobic Bacterial Preliminary             Statement of Approval     Ordered          07/13/18 1041  I have reviewed and agree with all the recommendations and orders detailed in this document.  EFFECTIVE NOW     Approved and electronically signed by:  Kb Jeffries PA-C             Admission Information     Date & Time Provider Department Dept. Phone    6/29/2018 Joao Mason MD [address] [facility] [phone]      Your Vitals Were     Blood Pressure Pulse Temperature Respirations Height Weight    143/56 89 98.1  F (36.7  C) (Oral) 18 1.676 m (5' 6\") 139.9 kg (308 lb 6.4 oz)    Pulse Oximetry BMI (Body Mass Index)                97% 49.78 kg/m2          [employer] Information     [employer] gives you secure access to your electronic health record. If you see a primary care provider, you can also send messages to your care team and make appointments. If you have questions, please call your primary care clinic.  If you do not have a primary care provider, please call [phone] and they will assist you.        Care EveryWhere ID     This is your Care EveryWhere ID. This could be used by other organizations to access your [facility] medical records  [id number]        Equal Access to Services     KJ SALVADOR : Hadii aad ku hadasho Soomaali, waaxda luqadaha, qaybta kaalmada mingoegyada, gopal pena . So [facility] [phone].    ATENCIÓN: Si habla español, tiene a gillespie disposición servicios gratuitos de asistencia lingüística. Llame al [phone].    We comply with applicable federal civil rights laws and [place] laws. We do not discriminate on the basis of race, " color, national origin, age, disability, sex, sexual orientation, or gender identity.               Review of your medicines      START taking        Dose / Directions    acetaminophen 325 MG tablet   Commonly known as:  TYLENOL   Used for:  Acute post-operative pain        Dose:  650 mg   Take 2 tablets (650 mg) by mouth every 4 hours as needed for mild pain   Quantity:  100 tablet   Refills:  0       artificial saliva Soln solution        Dose:  1 spray   Swish and spit 1 mL (1 spray) in mouth 4 times daily as needed for dry mouth   Refills:  0       bumetanide 2 MG tablet   Commonly known as:  BUMEX   Used for:  Chronic diastolic heart failure (H)        Dose:  2 mg   Take 1 tablet (2 mg) by mouth 2 times daily   Refills:  0       ciprofloxacin 250 MG tablet   Commonly known as:  CIPRO   Indication:  Urinary Tract Infection   Used for:  Urinary tract infection associated with indwelling urethral catheter, initial encounter (H)        Dose:  250 mg   Take 1 tablet (250 mg) by mouth every 12 hours for 6 days   Refills:  0       fluticasone-vilanterol 100-25 MCG/INH oral inhaler   Commonly known as:  BREO ELLIPTA   Used for:  Chronic obstructive pulmonary disease, unspecified COPD type (H)        Dose:  1 puff   Start taking on:  7/14/2018   Inhale 1 puff into the lungs daily   Refills:  0       hydrALAZINE 25 MG tablet   Commonly known as:  APRESOLINE   Used for:  Benign essential hypertension        Dose:  75 mg   Take 3 tablets (75 mg) by mouth 3 times daily   Quantity:  60 tablet   Refills:  0       HYDROmorphone 2 MG tablet   Commonly known as:  DILAUDID   Used for:  Acute post-operative pain        Dose:  2-4 mg   Take 1-2 tablets (2-4 mg) by mouth every 3 hours as needed for moderate to severe pain   Quantity:  30 tablet   Refills:  0       * insulin aspart 100 UNIT/ML injection   Commonly known as:  NovoLOG PEN   Used for:  Type 2 diabetes mellitus with chronic kidney disease, without long-term current use  of insulin, unspecified CKD stage (H)        Subcutaneous Take with snacks or supplements for high blood sugar 1 units per 5 grams of carbohydrate. Only chart total amount of units given.  Do not give if pre-prandial glucose is less than 60 mg/dL.   Refills:  0       * insulin aspart 100 UNIT/ML injection   Commonly known as:  NovoLOG PEN   Used for:  Type 2 diabetes mellitus with chronic kidney disease, without long-term current use of insulin, unspecified CKD stage (H)        Dose:  1-10 Units   Inject 1-10 Units Subcutaneous 3 times daily (before meals) For Pre-Meal -164 give 1 unit.  Pre-Meal -189 give 2 units.  Pre-Meal -214 give 3 units.  Pre-Meal -239 give 4 units.  Pre-Meal -264 give 5 units.  Pre-Meal -289 give 6 units.  Pre-Meal -314 give 7 units.  Pre-Meal -339 give 8 units.  Pre-Meal -364 give 9 units. Pre-Meal BG greater than or equal to 365 give 10 units   Refills:  0       * insulin aspart 100 UNIT/ML injection   Commonly known as:  NovoLOG PEN   Used for:  Type 2 diabetes mellitus with chronic kidney disease, without long-term current use of insulin, unspecified CKD stage (H)        Dose:  1-7 Units   Inject 1-7 Units Subcutaneous At Bedtime For  - 224 give 1 units.  For  - 249 give 2 units.  For  - 274 give 3 units.  For  - 299 give 4 units.  For  - 324 give 5 units.  For  - 349 give 6 units.  For BG greater than or equal to 350 give 7 units.  Notify provider if glucose greater than or equal to 350 mg/dL after administration of correction dose.   Refills:  0       * insulin aspart 100 UNIT/ML injection   Commonly known as:  NovoLOG PEN   Used for:  Type 2 diabetes mellitus with chronic kidney disease, without long-term current use of insulin, unspecified CKD stage (H)        Inject Subcutaneous 3 times daily (with meals) DOSE:  1 units per 5 grams of carbohydrate.  Only chart total amount of units given.  Do  not give if pre-prandial glucose is less than 60 mg/dL.   Refills:  0       * insulin isophane human 100 UNIT/ML injection   Commonly known as:  HumuLIN N PEN   Used for:  Type 2 diabetes mellitus with chronic kidney disease, without long-term current use of insulin, unspecified CKD stage (H)        Dose:  30 Units   Inject 30 Units Subcutaneous every 24 hours At 2000   Refills:  0       * insulin isophane human 100 UNIT/ML injection   Commonly known as:  HumuLIN N PEN   Used for:  Type 2 diabetes mellitus with chronic kidney disease, without long-term current use of insulin, unspecified CKD stage (H)   Replaces:  insulin  UNIT/ML injection        Dose:  35 Units   Start taking on:  7/14/2018   Inject 35 Units Subcutaneous every 24 hours At 0900   Refills:  0       melatonin 3 MG tablet        Dose:  3 mg   Take 1 tablet (3 mg) by mouth At Bedtime   Refills:  0       Metoprolol Tartrate 75 MG Tabs   Used for:  Benign essential hypertension        Dose:  75 mg   Take 75 mg by mouth 2 times daily   Quantity:  60 tablet   Refills:  0       pantoprazole 40 MG EC tablet   Commonly known as:  PROTONIX        Dose:  40 mg   Start taking on:  7/14/2018   Take 1 tablet (40 mg) by mouth every morning (before breakfast)   Quantity:  30 tablet   Refills:  0       polyethylene glycol Packet   Commonly known as:  MIRALAX/GLYCOLAX        Dose:  17 g   Start taking on:  7/14/2018   Take 17 g by mouth daily   Quantity:  7 packet   Refills:  0       * Notice:  This list has 6 medication(s) that are the same as other medications prescribed for you. Read the directions carefully, and ask your doctor or other care provider to review them with you.      CONTINUE these medicines which may have CHANGED, or have new prescriptions. If we are uncertain of the size of tablets/capsules you have at home, strength may be listed as something that might have changed.        Dose / Directions    * albuterol 108 (90 Base) MCG/ACT Inhaler    Commonly known as:  PROAIR HFA   This may have changed:  Another medication with the same name was added. Make sure you understand how and when to take each.   Used for:  Chronic obstructive pulmonary disease with acute exacerbation (H)        INHALE 1 TO 2 PUFFS EVERY 4 TO 6 HOURS AS NEEDED   Quantity:  1 Inhaler   Refills:  PRN       * albuterol (2.5 MG/3ML) 0.083% neb solution   This may have changed:  You were already taking a medication with the same name, and this prescription was added. Make sure you understand how and when to take each.   Used for:  Chronic obstructive pulmonary disease, unspecified COPD type (H)        Dose:  2.5 mg   Take 1 vial (2.5 mg) by nebulization 4 times daily   Quantity:  360 mL   Refills:  0       potassium chloride SA 20 MEQ CR tablet   Commonly known as:  K-DUR/KLOR-CON M   This may have changed:    - medication strength  - how much to take  - how to take this  - when to take this  - additional instructions   Used for:  Hypokalemia, Chronic diastolic heart failure (H)        Dose:  60 mEq   Take 3 tablets (60 mEq) by mouth 2 times daily   Quantity:  60 tablet   Refills:  0       * Notice:  This list has 2 medication(s) that are the same as other medications prescribed for you. Read the directions carefully, and ask your doctor or other care provider to review them with you.      CONTINUE these medicines which have NOT CHANGED        Dose / Directions    aspirin 325 MG EC tablet        Dose:  325 mg   Take 1 tablet by mouth daily.   Quantity:  30 tablet   Refills:  0       atorvastatin 40 MG tablet   Commonly known as:  LIPITOR   Used for:  Hyperlipidemia with target LDL less than 70        TAKE 1 TABLET(40 MG) BY MOUTH DAILY   Quantity:  90 tablet   Refills:  3       B-D U/F 31G X 5 MM   Used for:  Type 2 diabetes mellitus with diabetic polyneuropathy, without long-term current use of insulin (H)   Generic drug:  insulin pen needle        USE FOR INSULIN INJECTION   Quantity:   100 each   Refills:  0       blood glucose monitoring test strip   Commonly known as:  no brand specified   Used for:  Type 2 diabetes mellitus with diabetic polyneuropathy, without long-term current use of insulin (H)        Dose:  1 strip   1 strip by In Vitro route 2 times daily Strips per patient's preference   Quantity:  200 each   Refills:  3       cyclobenzaprine 10 MG tablet   Commonly known as:  FLEXERIL   Used for:  Acute right-sided thoracic back pain        TAKE 1 TABLET(10 MG) BY MOUTH THREE TIMES DAILY AS NEEDED FOR MUSCLE SPASMS   Quantity:  90 tablet   Refills:  3       cycloSPORINE 0.05 % ophthalmic emulsion   Commonly known as:  RESTASIS   Used for:  Keratitis sicca, bilateral        Dose:  1 drop   Place 1 drop into both eyes 2 times daily   Quantity:  180 each   Refills:  3       EPINEPHrine 0.3 MG/0.3ML injection 2-pack   Commonly known as:  EPIPEN/ADRENACLICK/or ANY BX GENERIC EQUIV   Used for:  Allergic reaction, subsequent encounter        Dose:  0.3 mg   Inject 0.3 mLs (0.3 mg) into the muscle once as needed for anaphylaxis   Quantity:  0.6 mL   Refills:  0       escitalopram 20 MG tablet   Commonly known as:  LEXAPRO   Used for:  Major depressive disorder, single episode, in partial remission (H)        Dose:  20 mg   Take 1 tablet (20 mg) by mouth every morning   Quantity:  90 tablet   Refills:  3       folic acid 800 MCG Tabs        Dose:  2 tablet   Take 2 tablets by mouth daily 2 (400 MCG) tablets   Refills:  0       GUAIFENESIN  MG Tbcr        Take 600 mg by mouth twice daily   Quantity:  30   Refills:  0       insulin syringes (disposable) U-100 1 ML Misc   Used for:  Type 2 diabetes mellitus with chronic kidney disease, without long-term current use of insulin, unspecified CKD stage (H)        Dose:  1 each   1 each 5 times daily   Quantity:  100 each   Refills:  11       levothyroxine 150 MCG tablet   Commonly known as:  SYNTHROID/LEVOTHROID   Used for:  Hypothyroidism,  unspecified type        Dose:  150 mcg   Take 1 tablet (150 mcg) by mouth daily   Quantity:  90 tablet   Refills:  3       [employer] Pharm[employer]CAN FINEPOINT LANCETS Misc   Used for:  Type 2 diabetes mellitus with diabetic polyneuropathy, without long-term current use of insulin (H)        Use to test blood sugars 2 times daily or as directed.   Quantity:  100 each   Refills:  prn       LYRICA 100 MG capsule   Used for:  Myalgia   Generic drug:  pregabalin        TAKE ONE CAPSULE BY MOUTH TWICE DAILY   Quantity:  180 capsule   Refills:  3       niacin 500 MG CR tablet   Commonly known as:  NIASPAN   Used for:  Hyperlipidemia with target LDL less than 70        TAKE 1 TABLET(500 MG) BY MOUTH TWICE DAILY   Quantity:  180 tablet   Refills:  1       nitroGLYcerin 0.4 MG sublingual tablet   Commonly known as:  NITROSTAT   Used for:  Atherosclerosis of native coronary artery of native heart, angina presence unspecified        Dose:  0.4 mg   Place 1 tablet (0.4 mg) under the tongue every 5 minutes as needed for chest pain As needed for chest pain.   Quantity:  25 tablet   Refills:  PRN       ONE TOUCH ULTRA (DEVICES) Misc   Used for:  Type II or unspecified type diabetes mellitus without mention of complication, not stated as uncontrolled        test blood sugar BID   Quantity:  1   Refills:  0       prochlorperazine 5 MG tablet   Commonly known as:  COMPAZINE        Dose:  5-10 mg   Take 1-2 tablets (5-10 mg) by mouth every 6 hours as needed (Headache)   Quantity:  10 tablet   Refills:  0       senna-docusate 8.6-50 MG per tablet   Commonly known as:  SENOKOT-S;PERICOLACE   Used for:  Constipation        Dose:  1-2 tablet   Take 1-2 tablets by mouth 2 times daily as needed for constipation   Quantity:  60 tablet   Refills:  0       traZODone 50 MG tablet   Commonly known as:  DESYREL   Used for:  Insomnia, unspecified type        Dose:  150 mg   Take 3 tablets (150 mg) by mouth At Bedtime   Quantity:  90 tablet   Refills:  7        ULORIC 40 MG Tabs tablet   Used for:  Hyperuricemia   Generic drug:  febuxostat        TAKE 1 TABLET(40 MG) BY MOUTH EVERY EVENING   Quantity:  90 tablet   Refills:  1       vitamin D3 5000 UNIT/ML Liqd   Indication:  Liquid gel caps        Dose:  33390 Units   Take 10,000 Units by mouth daily   Refills:  0         STOP taking     furosemide 40 MG tablet   Commonly known as:  LASIX           insulin glargine 100 UNIT/ML injection   Commonly known as:  LANTUS SOLOSTAR           insulin  UNIT/ML injection   Commonly known as:  NovoLIN N VIAL   Replaced by:  insulin isophane human 100 UNIT/ML injection           insulin regular 100 UNIT/ML injection   Commonly known as:  NovoLIN R VIAL           sitagliptin 50 MG tablet   Commonly known as:  JANUVIA           tiZANidine 4 MG tablet   Commonly known as:  ZANAFLEX                Where to get your medicines      Some of these will need a paper prescription and others can be bought over the counter. Ask your nurse if you have questions.     You don't need a prescription for these medications     acetaminophen 325 MG tablet    albuterol (2.5 MG/3ML) 0.083% neb solution    artificial saliva Soln solution    bumetanide 2 MG tablet    ciprofloxacin 250 MG tablet    fluticasone-vilanterol 100-25 MCG/INH oral inhaler    hydrALAZINE 25 MG tablet    insulin aspart 100 UNIT/ML injection    insulin aspart 100 UNIT/ML injection    insulin aspart 100 UNIT/ML injection    insulin aspart 100 UNIT/ML injection    insulin isophane human 100 UNIT/ML injection    insulin isophane human 100 UNIT/ML injection    melatonin 3 MG tablet    Metoprolol Tartrate 75 MG Tabs    pantoprazole 40 MG EC tablet    polyethylene glycol Packet    potassium chloride SA 20 MEQ CR tablet         Information about where to get these medications is not yet available     ! Ask your nurse or doctor about these medications     HYDROmorphone 2 MG tablet                Protect others around you: Learn how to  safely use, store and throw away your medicines at www.disposemymeds.org.        ANTIBIOTIC INSTRUCTION     You've Been Prescribed an Antibiotic - Now What?  Your healthcare team thinks that you or your loved one might have an infection. Some infections can be treated with antibiotics, which are powerful, life-saving drugs. Like all medications, antibiotics have side effects and should only be used when necessary. There are some important things you should know about your antibiotic treatment.      Your healthcare team may run tests before you start taking an antibiotic.    Your team may take samples (e.g., from your blood, urine or other areas) to run tests to look for bacteria. These test can be important to determine if you need an antibiotic at all and, if you do, which antibiotic will work best.      Within a few days, your healthcare team might change or even stop your antibiotic.    Your team may start you on an antibiotic while they are working to find out what is making you sick.    Your team might change your antibiotic because test results show that a different antibiotic would be better to treat your infection.    In some cases, once your team has more information, they learn that you do not need an antibiotic at all. They may find out that you don't have an infection, or that the antibiotic you're taking won't work against your infection. For example, an infection caused by a virus can't be treated with antibiotics. Staying on an antibiotic when you don't need it is more likely to be harmful than helpful.      You may experience side effects from your antibiotic.    Like all medications, antibiotics have side effects. Some of these can be serious.    Let you healthcare team know if you have any known allergies when you are admitted to the hospital.    One significant side effect of nearly all antibiotics is the risk of severe and sometimes deadly diarrhea caused by Clostridium difficile (C. Difficile).  This occurs when a person takes antibiotics because some good germs are destroyed. Antibiotic use allows C. diificile to take over, putting patients at high risk for this serious infection.    As a patient or caregiver, it is important to understand your or your loved one's antibiotic treatment. It is especially important for caregivers to speak up when patients can't speak for themselves. Here are some important questions to ask your healthcare team.    What infection is this antibiotic treating and how do you know I have that infection?    What side effects might occur from this antibiotic?    How long will I need to take this antibiotic?    Is it safe to take this antibiotic with other medications or supplements (e.g., vitamins) that I am taking?     Are there any special directions I need to know about taking this antibiotic? For example, should I take it with food?    How will I be monitored to know whether my infection is responding to the antibiotic?    What tests may help to make sure the right antibiotic is prescribed for me?      Information provided by:  www.cdc.gov/getsmart  U.S. Department of Health and Human Services  Centers for disease Control and Prevention  National Center for Emerging and Zoonotic Infectious Diseases  Division of Healthcare Quality Promotion        Information about OPIOIDS     PRESCRIPTION OPIOIDS: WHAT YOU NEED TO KNOW   We gave you an opioid (narcotic) pain medicine. It is important to manage your pain, but opioids are not always the best choice. You should first try all the other options your care team gave you. Take this medicine for as short a time (and as few doses) as possible.     These medicines have risks:    DO NOT drive when on new or higher doses of pain medicine. These medicines can affect your alertness and reaction times, and you could be arrested for driving under the influence (DUI). If you need to use opioids long-term, talk to your care team about  driving.    DO NOT operate heave machinery    DO NOT do any other dangerous activities while taking these medicines.     DO NOT drink any alcohol while taking these medicines.      If the opioid prescribed includes acetaminophen, DO NOT take with any other medicines that contain acetaminophen. Read all labels carefully. Look for the word  acetaminophen  or  Tylenol.  Ask your pharmacist if you have questions or are unsure.    You can get addicted to pain medicines, especially if you have a history of addiction (chemical, alcohol or substance dependence). Talk to your care team about ways to reduce this risk.    Store your pills in a secure place, locked if possible. We will not replace any lost or stolen medicine. If you don t finish your medicine, please throw away (dispose) as directed by your pharmacist. The [place] Pollution Control Agency has more information about safe disposal: https://www.pca.[facility].[place].us/living-green/managing-unwanted-medications.     All opioids tend to cause constipation. Drink plenty of water and eat foods that have a lot of fiber, such as fruits, vegetables, prune juice, apple juice and high-fiber cereal. Take a laxative (Miralax, milk of magnesia, Colace, Senna) if you don t move your bowels at least every other day.              Medication List: This is a list of all your medications and when to take them. Check marks below indicate your daily home schedule. Keep this list as a reference.      Medications           Morning Afternoon Evening Bedtime As Needed    acetaminophen 325 MG tablet   Commonly known as:  TYLENOL   Take 2 tablets (650 mg) by mouth every 4 hours as needed for mild pain   Last time this was given:  975 mg on 7/13/2018  9:14 AM                                * albuterol 108 (90 Base) MCG/ACT Inhaler   Commonly known as:  PROAIR HFA   INHALE 1 TO 2 PUFFS EVERY 4 TO 6 HOURS AS NEEDED   Last time this was given:  2 puffs on 7/11/2018  5:17 AM                                 * albuterol (2.5 MG/3ML) 0.083% neb solution   Take 1 vial (2.5 mg) by nebulization 4 times daily   Last time this was given:  2.5 mg on 7/13/2018 12:28 PM                                artificial saliva Soln solution   Swish and spit 1 mL (1 spray) in mouth 4 times daily as needed for dry mouth   Last time this was given:  1 spray on 7/5/2018  8:38 AM                                aspirin 325 MG EC tablet   Take 1 tablet by mouth daily.   Last time this was given:  325 mg on 7/1/2018  8:03 AM                                atorvastatin 40 MG tablet   Commonly known as:  LIPITOR   TAKE 1 TABLET(40 MG) BY MOUTH DAILY   Last time this was given:  40 mg on 7/12/2018  8:08 PM                                B-D U/F 31G X 5 MM   USE FOR INSULIN INJECTION   Generic drug:  insulin pen needle                                blood glucose monitoring test strip   Commonly known as:  no brand specified   1 strip by In Vitro route 2 times daily Strips per patient's preference                                bumetanide 2 MG tablet   Commonly known as:  BUMEX   Take 1 tablet (2 mg) by mouth 2 times daily   Last time this was given:  2 mg on 7/13/2018  9:11 AM                                ciprofloxacin 250 MG tablet   Commonly known as:  CIPRO   Take 1 tablet (250 mg) by mouth every 12 hours for 6 days   Last time this was given:  250 mg on 7/13/2018  9:11 AM                                cyclobenzaprine 10 MG tablet   Commonly known as:  FLEXERIL   TAKE 1 TABLET(10 MG) BY MOUTH THREE TIMES DAILY AS NEEDED FOR MUSCLE SPASMS   Last time this was given:  10 mg on 7/11/2018  6:11 AM                                cycloSPORINE 0.05 % ophthalmic emulsion   Commonly known as:  RESTASIS   Place 1 drop into both eyes 2 times daily                                EPINEPHrine 0.3 MG/0.3ML injection 2-pack   Commonly known as:  EPIPEN/ADRENACLICK/or ANY BX GENERIC EQUIV   Inject 0.3 mLs (0.3 mg) into the muscle once as needed for  anaphylaxis                                escitalopram 20 MG tablet   Commonly known as:  LEXAPRO   Take 1 tablet (20 mg) by mouth every morning   Last time this was given:  20 mg on 7/13/2018  9:11 AM                                fluticasone-vilanterol 100-25 MCG/INH oral inhaler   Commonly known as:  BREO ELLIPTA   Inhale 1 puff into the lungs daily   Start taking on:  7/14/2018   Last time this was given:  1 puff on 7/13/2018  9:18 AM                                folic acid 800 MCG Tabs   Take 2 tablets by mouth daily 2 (400 MCG) tablets                                GUAIFENESIN  MG Tbcr   Take 600 mg by mouth twice daily   Last time this was given:  600 mg on 7/13/2018  9:13 AM                                hydrALAZINE 25 MG tablet   Commonly known as:  APRESOLINE   Take 3 tablets (75 mg) by mouth 3 times daily   Last time this was given:  75 mg on 7/13/2018  9:12 AM                                HYDROmorphone 2 MG tablet   Commonly known as:  DILAUDID   Take 1-2 tablets (2-4 mg) by mouth every 3 hours as needed for moderate to severe pain   Last time this was given:  2 mg on 7/12/2018 11:12 AM                                * insulin aspart 100 UNIT/ML injection   Commonly known as:  NovoLOG PEN   Subcutaneous Take with snacks or supplements for high blood sugar 1 units per 5 grams of carbohydrate. Only chart total amount of units given.  Do not give if pre-prandial glucose is less than 60 mg/dL.   Last time this was given:  13 Units on 7/13/2018 11:22 AM                                * insulin aspart 100 UNIT/ML injection   Commonly known as:  NovoLOG PEN   Inject 1-10 Units Subcutaneous 3 times daily (before meals) For Pre-Meal -164 give 1 unit.  Pre-Meal -189 give 2 units.  Pre-Meal -214 give 3 units.  Pre-Meal -239 give 4 units.  Pre-Meal -264 give 5 units.  Pre-Meal -289 give 6 units.  Pre-Meal -314 give 7 units.  Pre-Meal -339 give 8 units.   Pre-Meal -364 give 9 units. Pre-Meal BG greater than or equal to 365 give 10 units   Last time this was given:  13 Units on 7/13/2018 11:22 AM                                * insulin aspart 100 UNIT/ML injection   Commonly known as:  NovoLOG PEN   Inject 1-7 Units Subcutaneous At Bedtime For  - 224 give 1 units.  For  - 249 give 2 units.  For  - 274 give 3 units.  For  - 299 give 4 units.  For  - 324 give 5 units.  For  - 349 give 6 units.  For BG greater than or equal to 350 give 7 units.  Notify provider if glucose greater than or equal to 350 mg/dL after administration of correction dose.   Last time this was given:  13 Units on 7/13/2018 11:22 AM                                * insulin aspart 100 UNIT/ML injection   Commonly known as:  NovoLOG PEN   Inject Subcutaneous 3 times daily (with meals) DOSE:  1 units per 5 grams of carbohydrate.  Only chart total amount of units given.  Do not give if pre-prandial glucose is less than 60 mg/dL.   Last time this was given:  13 Units on 7/13/2018 11:22 AM                                * insulin isophane human 100 UNIT/ML injection   Commonly known as:  HumuLIN N PEN   Inject 30 Units Subcutaneous every 24 hours At 2000   Last time this was given:  35 Units on 7/13/2018  9:09 AM                                * insulin isophane human 100 UNIT/ML injection   Commonly known as:  HumuLIN N PEN   Inject 35 Units Subcutaneous every 24 hours At 0900   Start taking on:  7/14/2018   Last time this was given:  35 Units on 7/13/2018  9:09 AM                                insulin syringes (disposable) U-100 1 ML Misc   1 each 5 times daily                                levothyroxine 150 MCG tablet   Commonly known as:  SYNTHROID/LEVOTHROID   Take 1 tablet (150 mcg) by mouth daily   Last time this was given:  150 mcg on 7/13/2018  9:11 AM                                LIFESCAN FINEPOINT LANCETS Misc   Use to test blood sugars 2 times  daily or as directed.                                LYRICA 100 MG capsule   TAKE ONE CAPSULE BY MOUTH TWICE DAILY   Last time this was given:  100 mg on 7/13/2018  9:13 AM   Generic drug:  pregabalin                                melatonin 3 MG tablet   Take 1 tablet (3 mg) by mouth At Bedtime   Last time this was given:  3 mg on 7/12/2018 10:00 PM                                Metoprolol Tartrate 75 MG Tabs   Take 75 mg by mouth 2 times daily   Last time this was given:  75 mg on 7/13/2018  9:13 AM                                niacin 500 MG CR tablet   Commonly known as:  NIASPAN   TAKE 1 TABLET(500 MG) BY MOUTH TWICE DAILY                                nitroGLYcerin 0.4 MG sublingual tablet   Commonly known as:  NITROSTAT   Place 1 tablet (0.4 mg) under the tongue every 5 minutes as needed for chest pain As needed for chest pain.                                ONE TOUCH ULTRA (DEVICES) Misc   test blood sugar BID                                pantoprazole 40 MG EC tablet   Commonly known as:  PROTONIX   Take 1 tablet (40 mg) by mouth every morning (before breakfast)   Start taking on:  7/14/2018   Last time this was given:  40 mg on 7/13/2018  9:13 AM                                polyethylene glycol Packet   Commonly known as:  MIRALAX/GLYCOLAX   Take 17 g by mouth daily   Start taking on:  7/14/2018   Last time this was given:  17 g on 7/13/2018  9:11 AM                                potassium chloride SA 20 MEQ CR tablet   Commonly known as:  K-DUR/KLOR-CON M   Take 3 tablets (60 mEq) by mouth 2 times daily   Last time this was given:  60 mEq on 7/13/2018  9:13 AM                                prochlorperazine 5 MG tablet   Commonly known as:  COMPAZINE   Take 1-2 tablets (5-10 mg) by mouth every 6 hours as needed (Headache)                                senna-docusate 8.6-50 MG per tablet   Commonly known as:  SENOKOT-S;PERICOLACE   Take 1-2 tablets by mouth 2 times daily as needed for constipation    Last time this was given:  2 tablets on 7/13/2018  9:13 AM                                traZODone 50 MG tablet   Commonly known as:  DESYREL   Take 3 tablets (150 mg) by mouth At Bedtime   Last time this was given:  150 mg on 7/3/2018 12:51 AM                                ULORIC 40 MG Tabs tablet   TAKE 1 TABLET(40 MG) BY MOUTH EVERY EVENING   Last time this was given:  40 mg on 7/12/2018  8:08 PM   Generic drug:  febuxostat                                vitamin D3 5000 UNIT/ML Liqd   Take 10,000 Units by mouth daily                                * Notice:  This list has 8 medication(s) that are the same as other medications prescribed for you. Read the directions carefully, and ask your doctor or other care provider to review them with you.

## 2018-06-29 NOTE — LETTER
Transition Communication Hand-off for Care Transitions to Next Level of Care Provider    Name: Mariel Ribeiro  : 1945  MRN #: [id number]  Primary Care Provider: Rafaela William     [facility] [address] [place] [address]     Reason for Hospitalization:  Hypokalemia [E87.6]  Acute chest pain [R07.9]  Admit Date/Time: 2018  4:16 PM  Discharge Date: 18  Payor Source: Payor: MEDICARE / Plan: MEDICARE / Product Type: Medicare /     Readmission Assessment Measure (SIMÓN) Risk Score/category: elevated  Reason for Communication Hand-off Referral: Other AVS, Fragility    Discharge Plan:  [facility]  [phone]       Concern for non-adherence with plan of care:   Y/N N  Discharge Needs Assessment:  Needs       Most Recent Value    Equipment Currently Used at Home cane, straight          Already enrolled in Tele-monitoring program and name of program:  Unknown  Follow-up specialty is recommended: Cardiology follow up needed.    Follow-up plan:  Future Appointments  Date Time Provider Department Center   2018 10:45 AM Marcela Pate, OT URAOT [place]   2018 1:30 PM Angelina Cunningham, PTA URAP[facility] [place]   2018 2:00 PM UC CVTS UCCTS [facility]   8/15/2018 8:30 AM UC LAB UCLAB [facility]   8/15/2018 9:00 AM Stephanie Wolf MD [facility]       Any outstanding tests or procedures:    Procedures     Future Labs/Procedures    Oxygen - Nasal cannula     Comments:    1-2 Lpm by nasal cannula to keep O2 sats 92% or greater.          Referrals     Future Labs/Procedures    Occupational Therapy Adult Consult     Comments:    Evaluate and treat as clinically indicated.    Reason:  S/p CABG    Physical Therapy Adult Consult     Comments:    Evaluate and treat as clinically indicated.    Reason:  S/p CABG            Key Recommendations:  None for today.    SAMANTHA Baptiste, TATIANNAW  6C Unit   Phone: [phone]  Pager: [phone]  Unit: [phone]

## 2018-06-29 NOTE — TELEPHONE ENCOUNTER
[facility]: Nurse Triage Note  SITUATION/BACKGROUND                                                      Mariel Ribeiro is a 73 year old female who calls with Chest Pain.    Description:  Tight pressure not a pain - but tight pressure  Onset/duration:  Last night  Precip. factors:  None noted  Associated symptoms:  Increased SOB, back of neck pain,states not really more dizzy or week than before angio gram on Tuesday - but her friend states she looks peaked  Improves/worsens symptoms:  nothing  Pain scale (0-10)   Na wouldn't give a number    MEDICATIONS:   Took one Nitro while on the phone with no relief. Instructed to take an aspirin when waiting for ambulance.    Allergies:   Allergies   Allergen Reactions     Contrast Dye Anaphylaxis     Fish Allergy Anaphylaxis     Iodine Anaphylaxis     Oxycodone Other (See Comments)     Severe suicidal tendencies on this medication     Tree Nuts [Nuts] Anaphylaxis     Tree nuts only (peanuts ok)     Albuterol Other (See Comments)     Sandrita-oral erythema     Bactrim      Increased uric acid     Betadine [Povidone Iodine] Hives, Swelling and Difficulty breathing     Betadine     Combivent      Rash     Dulaglutide Other (See Comments)     Hx . Of thyroid cancer.     Lisinopril Other (See Comments)     Scr/grf severely reduced.      Penicillins Rash     Allopurinol Rash     Latex Rash     Wool Fiber Rash       ASSESSMENT      See above.  Dr Torres nurse will be notified that patient is going to the ER.    RECOMMENDATION/PLAN                                                      RECOMMENDED DISPOSITION:  Call 911 -   Will comply with recommendation: Yes    If further questions/concerns or if symptoms do not improve, worsen or new symptoms develop, call your PCP or [phone] to talk with the Resident on call, as soon as possible.    Guideline used: Chest Pain  Pg 118  Telephone Triage Protocols for Nurses, Fifth Edition, Julie Briggs Kathleen M. Doege,  RN    ER notified that the patient will be coming in.

## 2018-06-30 ENCOUNTER — APPOINTMENT (OUTPATIENT)
Dept: OCCUPATIONAL THERAPY | Facility: CLINIC | Age: 73
DRG: 234 | End: 2018-06-30
Attending: INTERNAL MEDICINE
Payer: MEDICARE

## 2018-06-30 LAB
ALBUMIN UR-MCNC: NEGATIVE MG/DL
ANION GAP SERPL CALCULATED.3IONS-SCNC: 8 MMOL/L (ref 3–14)
APPEARANCE UR: CLEAR
BILIRUB UR QL STRIP: NEGATIVE
BUN SERPL-MCNC: 19 MG/DL (ref 7–30)
CALCIUM SERPL-MCNC: 9.1 MG/DL (ref 8.5–10.1)
CHLORIDE SERPL-SCNC: 106 MMOL/L (ref 94–109)
CO2 SERPL-SCNC: 27 MMOL/L (ref 20–32)
COLOR UR AUTO: ABNORMAL
CREAT SERPL-MCNC: 1.18 MG/DL (ref 0.52–1.04)
ERYTHROCYTE [DISTWIDTH] IN BLOOD BY AUTOMATED COUNT: 14.3 % (ref 10–15)
GFR SERPL CREATININE-BSD FRML MDRD: 45 ML/MIN/1.7M2
GLUCOSE BLDC GLUCOMTR-MCNC: 187 MG/DL (ref 70–99)
GLUCOSE BLDC GLUCOMTR-MCNC: 254 MG/DL (ref 70–99)
GLUCOSE BLDC GLUCOMTR-MCNC: 264 MG/DL (ref 70–99)
GLUCOSE BLDC GLUCOMTR-MCNC: 276 MG/DL (ref 70–99)
GLUCOSE SERPL-MCNC: 162 MG/DL (ref 70–99)
GLUCOSE UR STRIP-MCNC: 300 MG/DL
HCT VFR BLD AUTO: 37.1 % (ref 35–47)
HGB BLD-MCNC: 12.4 G/DL (ref 11.7–15.7)
HGB UR QL STRIP: NEGATIVE
HYALINE CASTS #/AREA URNS LPF: 4 /LPF (ref 0–2)
KETONES UR STRIP-MCNC: NEGATIVE MG/DL
LEUKOCYTE ESTERASE UR QL STRIP: ABNORMAL
MCH RBC QN AUTO: 28.2 PG (ref 26.5–33)
MCHC RBC AUTO-ENTMCNC: 33.4 G/DL (ref 31.5–36.5)
MCV RBC AUTO: 85 FL (ref 78–100)
NITRATE UR QL: NEGATIVE
PH UR STRIP: 5.5 PH (ref 5–7)
PLATELET # BLD AUTO: 132 10E9/L (ref 150–450)
POTASSIUM SERPL-SCNC: 3.7 MMOL/L (ref 3.4–5.3)
RBC # BLD AUTO: 4.39 10E12/L (ref 3.8–5.2)
RBC #/AREA URNS AUTO: <1 /HPF (ref 0–2)
SODIUM SERPL-SCNC: 140 MMOL/L (ref 133–144)
SOURCE: ABNORMAL
SP GR UR STRIP: 1.01 (ref 1–1.03)
TRANS CELLS #/AREA URNS HPF: <1 /HPF (ref 0–1)
UROBILINOGEN UR STRIP-MCNC: NORMAL MG/DL (ref 0–2)
WBC # BLD AUTO: 7.2 10E9/L (ref 4–11)
WBC #/AREA URNS AUTO: 16 /HPF (ref 0–5)

## 2018-06-30 PROCEDURE — 85027 COMPLETE CBC AUTOMATED: CPT | Performed by: INTERNAL MEDICINE

## 2018-06-30 PROCEDURE — A9270 NON-COVERED ITEM OR SERVICE: HCPCS | Mod: GY | Performed by: STUDENT IN AN ORGANIZED HEALTH CARE EDUCATION/TRAINING PROGRAM

## 2018-06-30 PROCEDURE — 25000128 H RX IP 250 OP 636: Performed by: STUDENT IN AN ORGANIZED HEALTH CARE EDUCATION/TRAINING PROGRAM

## 2018-06-30 PROCEDURE — 25000132 ZZH RX MED GY IP 250 OP 250 PS 637: Mod: GY | Performed by: STUDENT IN AN ORGANIZED HEALTH CARE EDUCATION/TRAINING PROGRAM

## 2018-06-30 PROCEDURE — 21400006 ZZH R&B CCU INTERMEDIATE UMMC

## 2018-06-30 PROCEDURE — 40000133 ZZH STATISTIC OT WARD VISIT: Performed by: OCCUPATIONAL THERAPIST

## 2018-06-30 PROCEDURE — 80048 BASIC METABOLIC PNL TOTAL CA: CPT | Performed by: INTERNAL MEDICINE

## 2018-06-30 PROCEDURE — 36415 COLL VENOUS BLD VENIPUNCTURE: CPT | Performed by: INTERNAL MEDICINE

## 2018-06-30 PROCEDURE — 97535 SELF CARE MNGMENT TRAINING: CPT | Mod: GO | Performed by: OCCUPATIONAL THERAPIST

## 2018-06-30 PROCEDURE — 81001 URINALYSIS AUTO W/SCOPE: CPT | Performed by: PHYSICIAN ASSISTANT

## 2018-06-30 PROCEDURE — 00000146 ZZHCL STATISTIC GLUCOSE BY METER IP

## 2018-06-30 PROCEDURE — 25000131 ZZH RX MED GY IP 250 OP 636 PS 637: Mod: GY | Performed by: STUDENT IN AN ORGANIZED HEALTH CARE EDUCATION/TRAINING PROGRAM

## 2018-06-30 PROCEDURE — 97165 OT EVAL LOW COMPLEX 30 MIN: CPT | Mod: GO | Performed by: OCCUPATIONAL THERAPIST

## 2018-06-30 PROCEDURE — 99232 SBSQ HOSP IP/OBS MODERATE 35: CPT | Mod: GC | Performed by: INTERNAL MEDICINE

## 2018-06-30 RX ORDER — DEXTROSE MONOHYDRATE 25 G/50ML
25-50 INJECTION, SOLUTION INTRAVENOUS
Status: DISCONTINUED | OUTPATIENT
Start: 2018-06-30 | End: 2018-06-30

## 2018-06-30 RX ORDER — LANOLIN ALCOHOL/MO/W.PET/CERES
3 CREAM (GRAM) TOPICAL AT BEDTIME
Status: DISCONTINUED | OUTPATIENT
Start: 2018-06-30 | End: 2018-07-13 | Stop reason: HOSPADM

## 2018-06-30 RX ORDER — TIZANIDINE 2 MG/1
2 TABLET ORAL
Status: DISCONTINUED | OUTPATIENT
Start: 2018-06-30 | End: 2018-07-12

## 2018-06-30 RX ORDER — CLINDAMYCIN PHOSPHATE 900 MG/50ML
900 INJECTION, SOLUTION INTRAVENOUS SEE ADMIN INSTRUCTIONS
Status: CANCELLED | OUTPATIENT
Start: 2018-06-30

## 2018-06-30 RX ORDER — AMLODIPINE BESYLATE 5 MG/1
5 TABLET ORAL DAILY
Status: DISCONTINUED | OUTPATIENT
Start: 2018-06-30 | End: 2018-07-06

## 2018-06-30 RX ORDER — ISOSORBIDE MONONITRATE 30 MG/1
30 TABLET, EXTENDED RELEASE ORAL 2 TIMES DAILY
Status: DISCONTINUED | OUTPATIENT
Start: 2018-06-30 | End: 2018-07-01

## 2018-06-30 RX ORDER — NICOTINE POLACRILEX 4 MG
15-30 LOZENGE BUCCAL
Status: DISCONTINUED | OUTPATIENT
Start: 2018-06-30 | End: 2018-06-30

## 2018-06-30 RX ORDER — CLINDAMYCIN PHOSPHATE 900 MG/50ML
900 INJECTION, SOLUTION INTRAVENOUS
Status: CANCELLED | OUTPATIENT
Start: 2018-06-30

## 2018-06-30 RX ORDER — AMLODIPINE BESYLATE 5 MG/1
5 TABLET ORAL AT BEDTIME
Status: DISCONTINUED | OUTPATIENT
Start: 2018-06-30 | End: 2018-06-30

## 2018-06-30 RX ORDER — FEBUXOSTAT 40 MG/1
40 TABLET, FILM COATED ORAL DAILY
Status: DISCONTINUED | OUTPATIENT
Start: 2018-06-30 | End: 2018-07-13 | Stop reason: HOSPADM

## 2018-06-30 RX ORDER — AMOXICILLIN 250 MG
3 CAPSULE ORAL AT BEDTIME
Status: DISCONTINUED | OUTPATIENT
Start: 2018-06-30 | End: 2018-07-02

## 2018-06-30 RX ADMIN — ESCITALOPRAM 20 MG: 20 TABLET, FILM COATED ORAL at 08:05

## 2018-06-30 RX ADMIN — GUAIFENESIN 600 MG: 600 TABLET, EXTENDED RELEASE ORAL at 20:07

## 2018-06-30 RX ADMIN — INSULIN ASPART 3 UNITS: 100 INJECTION, SOLUTION INTRAVENOUS; SUBCUTANEOUS at 20:06

## 2018-06-30 RX ADMIN — INSULIN ASPART 1 UNITS: 100 INJECTION, SOLUTION INTRAVENOUS; SUBCUTANEOUS at 09:55

## 2018-06-30 RX ADMIN — INSULIN ASPART 2 UNITS: 100 INJECTION, SOLUTION INTRAVENOUS; SUBCUTANEOUS at 22:48

## 2018-06-30 RX ADMIN — HUMAN INSULIN 10 UNITS: 100 INJECTION, SOLUTION SUBCUTANEOUS at 10:46

## 2018-06-30 RX ADMIN — SENNOSIDES AND DOCUSATE SODIUM 1 TABLET: 8.6; 5 TABLET ORAL at 22:47

## 2018-06-30 RX ADMIN — HEPARIN SODIUM 5000 UNITS: 5000 INJECTION, SOLUTION INTRAVENOUS; SUBCUTANEOUS at 22:46

## 2018-06-30 RX ADMIN — Medication 500 MG: at 10:46

## 2018-06-30 RX ADMIN — FEBUXOSTAT 40 MG: 40 TABLET ORAL at 22:47

## 2018-06-30 RX ADMIN — AMLODIPINE BESYLATE 5 MG: 5 TABLET ORAL at 13:42

## 2018-06-30 RX ADMIN — ASPIRIN 325 MG: 325 TABLET, DELAYED RELEASE ORAL at 08:02

## 2018-06-30 RX ADMIN — PREGABALIN 100 MG: 100 CAPSULE ORAL at 20:07

## 2018-06-30 RX ADMIN — ISOSORBIDE MONONITRATE 30 MG: 30 TABLET, EXTENDED RELEASE ORAL at 20:07

## 2018-06-30 RX ADMIN — ATORVASTATIN CALCIUM 40 MG: 40 TABLET, FILM COATED ORAL at 08:03

## 2018-06-30 RX ADMIN — HEPARIN SODIUM 5000 UNITS: 5000 INJECTION, SOLUTION INTRAVENOUS; SUBCUTANEOUS at 08:09

## 2018-06-30 RX ADMIN — ISOSORBIDE MONONITRATE 30 MG: 30 TABLET, EXTENDED RELEASE ORAL at 12:45

## 2018-06-30 RX ADMIN — Medication 150 MG: at 22:46

## 2018-06-30 RX ADMIN — POTASSIUM CHLORIDE 20 MEQ: 750 TABLET, EXTENDED RELEASE ORAL at 20:07

## 2018-06-30 RX ADMIN — INSULIN ASPART 2 UNITS: 100 INJECTION, SOLUTION INTRAVENOUS; SUBCUTANEOUS at 14:57

## 2018-06-30 RX ADMIN — GUAIFENESIN 600 MG: 600 TABLET, EXTENDED RELEASE ORAL at 08:11

## 2018-06-30 RX ADMIN — POTASSIUM CHLORIDE 20 MEQ: 750 TABLET, EXTENDED RELEASE ORAL at 02:12

## 2018-06-30 RX ADMIN — HUMAN INSULIN 10 UNITS: 100 INJECTION, SOLUTION SUBCUTANEOUS at 14:49

## 2018-06-30 RX ADMIN — CYCLOBENZAPRINE HYDROCHLORIDE 10 MG: 5 TABLET, FILM COATED ORAL at 08:02

## 2018-06-30 RX ADMIN — PREGABALIN 100 MG: 100 CAPSULE ORAL at 08:03

## 2018-06-30 RX ADMIN — FUROSEMIDE 40 MG: 40 TABLET ORAL at 08:02

## 2018-06-30 RX ADMIN — HEPARIN SODIUM 5000 UNITS: 5000 INJECTION, SOLUTION INTRAVENOUS; SUBCUTANEOUS at 14:49

## 2018-06-30 RX ADMIN — Medication 500 MG: at 20:07

## 2018-06-30 RX ADMIN — FUROSEMIDE 40 MG: 40 TABLET ORAL at 16:22

## 2018-06-30 RX ADMIN — LEVOTHYROXINE SODIUM 150 MCG: 150 TABLET ORAL at 07:57

## 2018-06-30 ASSESSMENT — PAIN DESCRIPTION - DESCRIPTORS: DESCRIPTORS: ACHING

## 2018-06-30 NOTE — CONSULTS
Cardiothoracic Surgery Consult    73F with known CAD who presented with chest pain. No CP at present, on a nitroglycerin drip. No SOB at present. Her presenting pain was associated with dyspnea, and improved with nitroglycerin. She had previously been scheduled to see us in clinic next Tuesday.     PMH: COPD, CAD, fibromyalgia, BELEN, obesity, DM2 on insulin pump, renal disease, ventral hernia, anxiety, depression, lymphedema    She was on morphine for chronic pain for 10+ years and completely weaned off 1 year ago    PSH: 2 prior coronary stents, cholecystectomy, bilateral knee replacements, hand and shoulder surgeries    SH: 27 pack year history quit 1989, no EtOH    FH: mother CAD, DM, HTN, DVTs; father CAD, HTN, CVA    Allergies: contrast, iodine, bactrim, oxycodone, albuterol, combivent, dulaglutide, lisinopril, penicillin, latex    ROS: all other systems negative    PE:   138kg, 168cm, BMI 49  T97.9, HR 68, /62, R16, sat 94% on 3LNC  NAD  Unlabored respirations  NSR  Abdomen nondistended, protuberant  Lymphedema of bilateral lower extremities with ACE wraps  Creatinine 1.18  Cath: multivessel CAD  Carotid ultrasound: R 50-69, L 70+% stenosis  ECHO 11/2017: EF 50-55%  CT Chest: scattered aortic calcification     A/P 73F with DM, multiple comorbidities, CAD s/p stents, who presented with angina.  Repeat ECHO  Plan for CAB Monday, 7/2 with Dr. Mason  Assuming her ECHO is unchanged the patient has an expected mortality of approximately 5%, a 1% risk of CVA, and a 16% risk of renal failure.     Patient seen with Dr. Marlon Rose

## 2018-06-30 NOTE — PROGRESS NOTES
SPIRITUAL HEALTH SERVICES  SPIRITUAL ASSESSMENT Progress Note  [facility]C   ON-CALL VISIT    REFERRAL SOURCE: Patient Request for Chaplaincy/SHS Consult Order     I made two attempts to visit Pt Mariel. The first time she was in the bathroom and her guest suggested I come back later, the second time she was in a meeting with her medical care team.    PLAN: Refer to 7/1 on-call and unit  for continued care and support.    Lloyd Mendez   Intern  Pager [phone]

## 2018-06-30 NOTE — ADDENDUM NOTE
Encounter addended by: Roney Wetzel MD on: 6/30/2018  8:50 AM<BR>     Actions taken: Sign clinical note

## 2018-06-30 NOTE — PROGRESS NOTES
CVTS:     Orders sign and held until OR date is finalized.   Planning to OR for CAB on Monday with Dr Mason.       Antwan Heredia PA-C  Cardiothoracic Surgery  Pager [phone]    3:53 PM   June 30, 2018

## 2018-06-30 NOTE — PLAN OF CARE
Problem: Patient Care Overview  Goal: Plan of Care/Patient Progress Review  RN:  1.Pt will remain hemodynamically stable.    2.Tropinin/EKG WNL.  Outcome: Improving  Pt admitted from [facility] ED for CP.  Accompanied by significant other.  A&OX4.  Denies CP/SOB on admission.  Troponin <0.015.  -180/.  Pt c/o HA.  Order for prn hydralazine for SBP >180.  Ntg gtt started per orders at 0.2mcg/kg/min.  -160/50-70.  Headache improved. Hydralazine not needed.  O2 sat 93-95% on RA.  Sat noted to dip to 60-80's occasionally while asleep.  No s/s distress.  Pt reports on bipap/cpap at home.  Discussed importance of use and if significant other can bring when  she comes in the am.  Pt placed on 3L at night maintaining O2 sat >95%.  Rhythm: SR 60-80.  Pleasant, sleeping between cares and assessments in no apparent distress. Ambulating w/ cane to BR with SBA. +BLE edema.  Lymphedema consult placed.  K+ 3.1. Pt given total of 60 mEq KCl per protocol.  Subq heparin per orders.  Continue to monitor s/s CP, EKG, troponin.  Anticipate CVTS consult today for CABG(workup already began as outpatient). Notify team with any change in status.

## 2018-06-30 NOTE — PROGRESS NOTES
06/30/18 0800   General Information   Discipline OT  (edema)   Onset of Edema 06/23/18   Affected Body Part(s) Left LE;Right LE   Edema Etiology (low GFR)   Pertinent history of current problem (PT: include personal factors and/or comorbidities that impact the POC; OT: include additional occupational profile info) Mariel Ribeiro is a 73 year old female Past mental history of diabetes, COPD, CAD with history of stent placement, CKD stage III, hyperlipidemia, multivessel vessel disease on recent angiogram presents with nearly 24 hours of chest pressure.   Edema Precautions Cardiac Edema/CHF   Pain   Patient currently in pain No   Pain comments LEs painful to touch, okay at rest   Edema Examination / Assessment   Skin Condition Dryness  (large volume)   Skin Condition Comments intact   Scar No   Ulcerations No   Capillary Refill Symmetrical   Stemmer Sign Negative   ROM   Range of Motion (WFL) no deficits were identified   Strength   Strength (WFL) no deficits were identified   Sensation   Sensation (WFL) no deficits were identified   Assessment/Plan   Patient presents with Stage 2 Lymphedema   Assessment Pt would benefit from IP edema therapy to help reduce risk for skin breakdown and infection   Assessment of Occupational Performance 1-3 Performance Deficits   Identified Performance Deficits leisure, home mgmt   Clinical Decision Making (Complexity) Low complexity   Planned Edema Interventions Gradient compression bandaging;Fit for compression garment;Exercises;ADL training;Education   Treatment Frequency 5 times/wk   Treatment Duration 7/14/18   Patient, Family and/or Staff in agreement with plan of care. Yes   Risks and benefits of treatment have been explained. Yes   Total Evaluation Time   Total Evaluation Time (Minutes) 10

## 2018-06-30 NOTE — PROGRESS NOTES
D: Pt admitted 6/29 for 24 hrs of CP. Pt had angio recently and was found to have MVD - CABG Monday. Hx of COPD, CAD, HLD,     I: Monitored vitals and assessed pt status.   Changed: n/a  Running: n/a  PRN: Flexeril 3x/day - Hydralazine SPB > 180    A: A0x4. VSS on RA - some high BPs this afternoon. Afebrile. Urinating adequately. SR (ST while ambulating). Denies pain, SOB, CP. Up SBA + cane and tolerating fairly well. Ambulated in hallway today. Good appetite. Pt scheduled to have CABG on Monday 7/2. Resting comfortably in bed.     P: Continue to monitor Pt status and report changes to treatment team.

## 2018-06-30 NOTE — PROGRESS NOTES
[facility]  CARDIOLOGY DAILY PROGRESS NOTE    Interval History: Feels better, chest pain gone, on IV nitro, denies any SOB today.     Mariel Ribeiro is a 73 year old female with severe triple vessel CAD, DM on insulin pump, COPD and previous PCI of LAD, hypertension, morbid obesity and sleep apnea, is here for new onset chest pain at home.     Assessment/Plan  Assessment & Plan      Unstable angina  - Trops negative, EKG no significant change  - Pain could be related to elevated afterload causing low flow with existent ischemia. Starting her on amlodipine 5 mg per day along with imdur 30 PO bid.   - CT surgery consult done here, patient is on schedule for surgery Monday July 2.  - Not on P2Y12 inhibitors. Continue asa  - Dc nitro now and can restart if need be (for recurrent chest pain or uncontrolled BP)     #HTN urgency  - as above, starting amlodipine and imdur now.         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   - Continue imdur and can add hydralazine if needed.      # DM2-  Recently started on outpatient insulin, actively titrating with pharmacist.   She reports insulin NPH 40 U at night, and regular insulin 16-18 U at meal time   lantus is listed as home med but she denies taking it  - regular 10 U , and moderate sliding scale  - add on NPH as needed and idea of how much diet she is taking in     Hypothyroid- cont synthroid      CAD- cont ASA   Copd- prn albuterol  CKD- CTM  HLD- cont statin, niacin (500mg BID, patient requests to cont taking this)  Migraine- prn compazine  Back spasms - tizanidine   Chronic pain/ fibromyalgia      Diet: NPO for Medical/Clinical Reasons Except for: Meds  Fluids: home diuretioc  DVT Prophylaxis: Heparin SQ  Code Status: Full Code        Objective  Temp:  [97.4  F (36.3  C)-98.5  F (36.9  C)] 97.9  F (36.6  C)  Pulse:  [60-74] 60  Heart Rate:  [56-90]  68  Resp:  [16] 16  BP: (134-184)/() 151/62  SpO2:  [93 %-98 %] 94 %    Intake/Output Summary (Last 24 hours) at 06/30/18 1127  Last data filed at 06/30/18 0600   Gross per 24 hour   Intake            76.73 ml   Output             1325 ml   Net         -1248.27 ml     Wt Readings from Last 5 Encounters:   06/30/18 138.2 kg (304 lb 11.2 oz)   06/26/18 137 kg (302 lb)   06/19/18 139 kg (306 lb 6.4 oz)   06/13/18 137 kg (302 lb)   05/22/18 138.3 kg (305 lb)     GEN: Well-appearing, mobile, obese  CV: RRR, no m/r/g, JVP WNL extremities wwp, femoral and distal pulses intact in b/l LE; trace peripheral edema at b/l ankles  PULM: no crackes, reduced BS at bases  ABD: soft, NT/ND  EXT: Trace edema, has lymphedema.     Current Medications  No current outpatient prescriptions on file.       amLODIPine  5 mg Oral Daily     aspirin  325 mg Oral Daily     atorvastatin  40 mg Oral Daily     escitalopram  20 mg Oral QAM     furosemide  40 mg Oral BID     guaiFENesin  600 mg Oral BID     heparin  5,000 Units Subcutaneous Q8H     insulin aspart  1-7 Units Subcutaneous TID AC     insulin aspart  1-5 Units Subcutaneous At Bedtime     insulin regular  10 Units Subcutaneous TID AC     isosorbide mononitrate  30 mg Oral BID     levothyroxine  150 mcg Oral Daily     niacin  500 mg Oral BID w/meals     pregabalin  100 mg Oral BID     sodium chloride (PF)  3 mL Intracatheter Q8H     traZODone  150 mg Oral At Bedtime         Lab Values  Labs have been reviewed  BMP  Recent Labs  Lab 06/30/18  0655 06/29/18  1713 06/26/18  0717    142 141   POTASSIUM 3.7 3.1* 3.6   CHLORIDE 106 103 106   ANTOINETTE 9.1 9.3 9.5   CO2 27 30 25   BUN 19 21 22   CR 1.18* 1.29* 1.28*   * 201* 210*     LFTs  Recent Labs  Lab 06/29/18  1713   ALKPHOS 108   AST 22   ALT 28   BILITOTAL 0.9   PROTTOTAL 6.5*   ALBUMIN 3.4      CBC  Recent Labs  Lab 06/30/18  0655 06/29/18  1713 06/26/18  0717   WBC 7.2 8.3 6.6   RBC 4.39 4.45 4.80   HGB 12.4 12.7 13.7   HCT  37.1 38.3 40.3   MCV 85 86 84   MCH 28.2 28.5 28.5   MCHC 33.4 33.2 34.0   RDW 14.3 14.3 14.2   * 142* 139*     INR  Recent Labs  Lab 06/29/18  1713   INR 1.01     TpnInvalid input(s): TPN    Discussed w/ Eren Yousif MD Mahesh Anantha MD  Cardiology.     I interviewed and examined the patient with the house staff.  I agree with the assessment and plan as documented.      Eren Yousif MD, PhD  Professor of Medicine  Division of Cardiology

## 2018-06-30 NOTE — PLAN OF CARE
Problem: Patient Care Overview  Goal: Plan of Care/Patient Progress Review  RN:  1.Pt will remain hemodynamically stable.    2.Tropinin/EKG WNL.   OT/edema 6C: Pt with large painful 1+ pitting edema in Ryan LEs. GCBs placed bilaterally from MTPs to knee creases. Please remove garment if it gets soiled or if pt c/o LE pain or numbness.
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
## 2018-06-30 NOTE — ED NOTES
Initial Assessment: VSS. Denies chest pain at this time. Ntg gtt being titrated to SBP<140. See flow sheet. Pleasant and co-op. Communicates needs without difficulty.

## 2018-07-01 ENCOUNTER — APPOINTMENT (OUTPATIENT)
Dept: OCCUPATIONAL THERAPY | Facility: CLINIC | Age: 73
DRG: 234 | End: 2018-07-01
Payer: MEDICARE

## 2018-07-01 ENCOUNTER — ANESTHESIA EVENT (OUTPATIENT)
Dept: SURGERY | Facility: CLINIC | Age: 73
DRG: 234 | End: 2018-07-01
Payer: MEDICARE

## 2018-07-01 LAB
ALBUMIN SERPL-MCNC: 3.1 G/DL (ref 3.4–5)
ALP SERPL-CCNC: 100 U/L (ref 40–150)
ALT SERPL W P-5'-P-CCNC: 26 U/L (ref 0–50)
AST SERPL W P-5'-P-CCNC: 20 U/L (ref 0–45)
BILIRUB DIRECT SERPL-MCNC: <0.1 MG/DL (ref 0–0.2)
BILIRUB SERPL-MCNC: 1 MG/DL (ref 0.2–1.3)
GLUCOSE BLDC GLUCOMTR-MCNC: 224 MG/DL (ref 70–99)
GLUCOSE BLDC GLUCOMTR-MCNC: 224 MG/DL (ref 70–99)
GLUCOSE BLDC GLUCOMTR-MCNC: 230 MG/DL (ref 70–99)
GLUCOSE BLDC GLUCOMTR-MCNC: 240 MG/DL (ref 70–99)
GLUCOSE BLDC GLUCOMTR-MCNC: 293 MG/DL (ref 70–99)
HBA1C MFR BLD: 9.3 % (ref 0–5.6)
PROT SERPL-MCNC: 6.1 G/DL (ref 6.8–8.8)

## 2018-07-01 PROCEDURE — A9270 NON-COVERED ITEM OR SERVICE: HCPCS | Mod: GY | Performed by: STUDENT IN AN ORGANIZED HEALTH CARE EDUCATION/TRAINING PROGRAM

## 2018-07-01 PROCEDURE — 00000146 ZZHCL STATISTIC GLUCOSE BY METER IP

## 2018-07-01 PROCEDURE — 36415 COLL VENOUS BLD VENIPUNCTURE: CPT | Performed by: PHYSICIAN ASSISTANT

## 2018-07-01 PROCEDURE — 25000128 H RX IP 250 OP 636: Performed by: STUDENT IN AN ORGANIZED HEALTH CARE EDUCATION/TRAINING PROGRAM

## 2018-07-01 PROCEDURE — 80076 HEPATIC FUNCTION PANEL: CPT | Performed by: PHYSICIAN ASSISTANT

## 2018-07-01 PROCEDURE — 25000125 ZZHC RX 250: Performed by: PHYSICIAN ASSISTANT

## 2018-07-01 PROCEDURE — 21400006 ZZH R&B CCU INTERMEDIATE UMMC

## 2018-07-01 PROCEDURE — 86900 BLOOD TYPING SEROLOGIC ABO: CPT | Performed by: PHYSICIAN ASSISTANT

## 2018-07-01 PROCEDURE — 25000132 ZZH RX MED GY IP 250 OP 250 PS 637: Mod: GY | Performed by: STUDENT IN AN ORGANIZED HEALTH CARE EDUCATION/TRAINING PROGRAM

## 2018-07-01 PROCEDURE — 25000131 ZZH RX MED GY IP 250 OP 636 PS 637: Mod: GY | Performed by: INTERNAL MEDICINE

## 2018-07-01 PROCEDURE — 40000133 ZZH STATISTIC OT WARD VISIT: Performed by: OCCUPATIONAL THERAPIST

## 2018-07-01 PROCEDURE — 86923 COMPATIBILITY TEST ELECTRIC: CPT | Performed by: PHYSICIAN ASSISTANT

## 2018-07-01 PROCEDURE — 83036 HEMOGLOBIN GLYCOSYLATED A1C: CPT | Performed by: PHYSICIAN ASSISTANT

## 2018-07-01 PROCEDURE — 86850 RBC ANTIBODY SCREEN: CPT | Performed by: PHYSICIAN ASSISTANT

## 2018-07-01 PROCEDURE — 25000128 H RX IP 250 OP 636: Performed by: PHYSICIAN ASSISTANT

## 2018-07-01 PROCEDURE — 99232 SBSQ HOSP IP/OBS MODERATE 35: CPT | Mod: GC | Performed by: INTERNAL MEDICINE

## 2018-07-01 PROCEDURE — 97140 MANUAL THERAPY 1/> REGIONS: CPT | Mod: GO | Performed by: OCCUPATIONAL THERAPIST

## 2018-07-01 PROCEDURE — 86901 BLOOD TYPING SEROLOGIC RH(D): CPT | Performed by: PHYSICIAN ASSISTANT

## 2018-07-01 RX ORDER — ISOSORBIDE MONONITRATE 60 MG/1
60 TABLET, EXTENDED RELEASE ORAL 2 TIMES DAILY
Status: DISCONTINUED | OUTPATIENT
Start: 2018-07-01 | End: 2018-07-01

## 2018-07-01 RX ORDER — MUPIROCIN 20 MG/G
1 OINTMENT TOPICAL 2 TIMES DAILY
Status: DISCONTINUED | OUTPATIENT
Start: 2018-07-01 | End: 2018-07-02 | Stop reason: HOSPADM

## 2018-07-01 RX ORDER — NITROGLYCERIN 20 MG/100ML
0.2 INJECTION INTRAVENOUS CONTINUOUS
Status: DISCONTINUED | OUTPATIENT
Start: 2018-07-01 | End: 2018-07-03

## 2018-07-01 RX ADMIN — PREGABALIN 100 MG: 100 CAPSULE ORAL at 20:00

## 2018-07-01 RX ADMIN — INSULIN ASPART 3 UNITS: 100 INJECTION, SOLUTION INTRAVENOUS; SUBCUTANEOUS at 19:05

## 2018-07-01 RX ADMIN — GUAIFENESIN 600 MG: 600 TABLET, EXTENDED RELEASE ORAL at 08:04

## 2018-07-01 RX ADMIN — INSULIN ASPART 1 UNITS: 100 INJECTION, SOLUTION INTRAVENOUS; SUBCUTANEOUS at 21:54

## 2018-07-01 RX ADMIN — LEVOTHYROXINE SODIUM 150 MCG: 150 TABLET ORAL at 08:04

## 2018-07-01 RX ADMIN — ATORVASTATIN CALCIUM 40 MG: 40 TABLET, FILM COATED ORAL at 20:00

## 2018-07-01 RX ADMIN — FUROSEMIDE 40 MG: 40 TABLET ORAL at 08:04

## 2018-07-01 RX ADMIN — INSULIN GLARGINE 15 UNITS: 100 INJECTION, SOLUTION SUBCUTANEOUS at 08:17

## 2018-07-01 RX ADMIN — NITROGLYCERIN 0.2 MCG/KG/MIN: 20 INJECTION INTRAVENOUS at 13:24

## 2018-07-01 RX ADMIN — Medication 3 MG: at 21:51

## 2018-07-01 RX ADMIN — Medication 150 MG: at 21:51

## 2018-07-01 RX ADMIN — Medication 500 MG: at 19:06

## 2018-07-01 RX ADMIN — HEPARIN SODIUM 5000 UNITS: 5000 INJECTION, SOLUTION INTRAVENOUS; SUBCUTANEOUS at 08:04

## 2018-07-01 RX ADMIN — CYCLOBENZAPRINE HYDROCHLORIDE 10 MG: 5 TABLET, FILM COATED ORAL at 08:03

## 2018-07-01 RX ADMIN — PREGABALIN 100 MG: 100 CAPSULE ORAL at 08:14

## 2018-07-01 RX ADMIN — ASPIRIN 325 MG: 325 TABLET, DELAYED RELEASE ORAL at 08:03

## 2018-07-01 RX ADMIN — ESCITALOPRAM 20 MG: 20 TABLET, FILM COATED ORAL at 08:03

## 2018-07-01 RX ADMIN — HEPARIN SODIUM 5000 UNITS: 5000 INJECTION, SOLUTION INTRAVENOUS; SUBCUTANEOUS at 15:48

## 2018-07-01 RX ADMIN — FUROSEMIDE 40 MG: 40 TABLET ORAL at 15:48

## 2018-07-01 RX ADMIN — FEBUXOSTAT 40 MG: 40 TABLET ORAL at 20:00

## 2018-07-01 RX ADMIN — MUPIROCIN 1 G: 20 OINTMENT TOPICAL at 21:57

## 2018-07-01 RX ADMIN — ISOSORBIDE MONONITRATE 30 MG: 30 TABLET, EXTENDED RELEASE ORAL at 08:03

## 2018-07-01 RX ADMIN — Medication 500 MG: at 08:04

## 2018-07-01 RX ADMIN — INSULIN ASPART 4 UNITS: 100 INJECTION, SOLUTION INTRAVENOUS; SUBCUTANEOUS at 15:11

## 2018-07-01 RX ADMIN — GUAIFENESIN 600 MG: 600 TABLET, EXTENDED RELEASE ORAL at 20:00

## 2018-07-01 RX ADMIN — INSULIN ASPART 2 UNITS: 100 INJECTION, SOLUTION INTRAVENOUS; SUBCUTANEOUS at 09:38

## 2018-07-01 RX ADMIN — AMLODIPINE BESYLATE 5 MG: 5 TABLET ORAL at 08:03

## 2018-07-01 ASSESSMENT — PAIN DESCRIPTION - DESCRIPTORS: DESCRIPTORS: ACHING

## 2018-07-01 ASSESSMENT — COPD QUESTIONNAIRES: COPD: 1

## 2018-07-01 NOTE — PLAN OF CARE
Problem: Patient Care Overview  Goal: Plan of Care/Patient Progress Review  RN:  1.Pt will remain hemodynamically stable.    2.Tropinin/EKG WNL.   Outcome: Improving    D: Pt admitted 6/29 with chest pain, scheduled for CABG on 7/2. PMH: CAD, DM II, COPD, HTN, obesity, BELEN.    I/A: No acute events overnight. A&Ox4. BP elevated, however SBP < 180. Other VSS. RA during the day, placed on 2L NC overnight. Monitor shows sinus rhythm, HR 70's-90's. Denied any chest pain. TOWNSEND. Voiding with adequate output. No BM overnight. Continues on low saturated fat diet. Blood sugars elevated overnight and managed with SSI. Notified cardiology cross cover of BG of 230 at 0200. Will start on lantus this morning. Up with SBA and a cane. Appeared to rest well between cares, making needs known.     P: Plan for CABG on 7/2. Continue to monitor. Notify Cards 1 with changes/concerns.

## 2018-07-01 NOTE — PROGRESS NOTES
SPIRITUAL HEALTH SERVICES  SPIRITUAL ASSESSMENT Progress Note  [facility] ([place] 6C     PRIMARY FOCUS:     Goals of care    Emotional/spiritual/Hinduism distress    Support for coping    REFERRAL SOURCE: Patient requested a SH encounter today.     ILLNESS CIRCUMSTANCES:   Reviewed documentation. Reflective conversation shared with patient  which integrated elements of illness and family narratives.     Context of Serious Illness/Symptom(s) - Patient will be having heart bypass surgery early tomorrow morning.     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     DISTRESS:     Emotional/Spiritual/Existential Distress - Patient states that this is a fairly big surgery and she will have to probably have another to open a carotid artery in the future.     Oriental orthodox Distress - Patient asked if she could have a  visit and offer her the sacrament of the anointing of the sick tonight.     Social/Cultural/Economic Distress - None discussed.     SPIRITUAL/Mandaen COPING:     Bahai/Nelly - Baptism    Spiritual Practice(s) - Associate member of the [facility], is on a prayer list.  Patient wanted to receive the Eucharist today.  Prayed together and offered the patient and her friends the Eucharist.     Emotional/Relational/Existential Connections - Patient voices that she has many people praying for and supporting her    GOALS OF CARE:    Goals of Care - To have a successful surgery    Meaning/Sense-Making - Not discussed     PLAN: I called the  on call and her will come in this evening to offer the sacrament of the sick.  Recommend  or Unit  follow after surgery.    Carola Ortega  Staff   Pager [phone]

## 2018-07-01 NOTE — PROGRESS NOTES
[facility]  CARDIOLOGY DAILY PROGRESS NOTE    Interval History: Feels better, chest pain gone, felt like she had some allergy to lantus but then thought this was anxiety.     Mariel Ribeiro is a 73 year old female with severe triple vessel CAD, DM on insulin pump, COPD and previous PCI of LAD, hypertension, morbid obesity and sleep apnea, is here for new onset chest pain at home.     Assessment/Plan  Assessment & Plan      Unstable angina  - Trops negative, EKG no significant change  - Pain could be related to elevated afterload causing low flow with existent ischemia.   - Started her on amlodipine 5 mg per day along with imdur, increased dose to 60 mg bid.   - CT surgery consult done here, patient is on schedule for surgery Monday July 2.  - Not on P2Y12 inhibitors. Continue asa  - Restart nitro.      #HTN urgency  - as above, starting amlodipine and imdur now. Start nitro.      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   - Continue imdur and can add hydralazine if needed.      # DM2-  Recently started on outpatient insulin, actively titrating with pharmacist.   She reports insulin NPH 40 U at night, and regular insulin 16-18 U at meal time   lantus is listed as home med but she denies taking it  - regular 10 U , and moderate sliding scale  - add on NPH as needed and idea of how much diet she is taking in     Hypothyroid- cont synthroid      CAD- cont ASA   Copd- prn albuterol  CKD- CTM  HLD- cont statin, niacin (500mg BID, patient requests to cont taking this)  Migraine- prn compazine  Back spasms - tizanidine   Chronic pain/ fibromyalgia      Diet: NPO for Medical/Clinical Reasons Except for: Meds  Fluids: home diuretioc  DVT Prophylaxis: Heparin SQ  Code Status: Full Code        Objective  Temp:  [97.6  F (36.4  C)-98.1  F (36.7  C)] 98.1  F (36.7  C)  Heart Rate:  [75-91] 82  Resp:  [16-18]  18  BP: (139-179)/(58-86) 155/85  SpO2:  [91 %-97 %] 94 %    Intake/Output Summary (Last 24 hours) at 06/30/18 1127  Last data filed at 06/30/18 0600      Intake/Output Summary (Last 24 hours) at 07/01/18 1619  Last data filed at 07/01/18 1600   Gross per 24 hour   Intake              460 ml   Output             1550 ml   Net            -1090 ml       Wt Readings from Last 5 Encounters:   07/01/18 139.6 kg (307 lb 11.2 oz)   06/26/18 137 kg (302 lb)   06/19/18 139 kg (306 lb 6.4 oz)   06/13/18 137 kg (302 lb)   05/22/18 138.3 kg (305 lb)     GEN: Well-appearing, mobile, obese  CV: RRR, no m/r/g, JVP WNL extremities wwp, femoral and distal pulses intact in b/l LE; trace peripheral edema at b/l ankles  PULM: no crackes, reduced BS at bases  ABD: soft, NT/ND  EXT: Trace edema, has lymphedema.     Current Medications  No current outpatient prescriptions on file.       amLODIPine  5 mg Oral Daily     aspirin  325 mg Oral Daily     atorvastatin  40 mg Oral Daily     escitalopram  20 mg Oral QAM     febuxostat  40 mg Oral Daily     furosemide  40 mg Oral BID     guaiFENesin  600 mg Oral BID     heparin  5,000 Units Subcutaneous Q8H     insulin aspart   Subcutaneous TID w/meals     insulin aspart  1-7 Units Subcutaneous TID AC     insulin aspart  1-5 Units Subcutaneous At Bedtime     insulin glargine  15 Units Subcutaneous QAM AC     levothyroxine  150 mcg Oral Daily     melatonin  3 mg Oral At Bedtime     mupirocin  1 g Both Nostrils BID     niacin  500 mg Oral BID w/meals     pregabalin  100 mg Oral BID     senna-docusate  3 tablet Oral At Bedtime     sodium chloride (PF)  3 mL Intracatheter Q8H     traZODone  150 mg Oral At Bedtime         Lab Values  Labs have been reviewed  BMP    Recent Labs  Lab 06/30/18  0655 06/29/18  1713 06/26/18  0717    142 141   POTASSIUM 3.7 3.1* 3.6   CHLORIDE 106 103 106   ANTOINETTE 9.1 9.3 9.5   CO2 27 30 25   BUN 19 21 22   CR 1.18* 1.29* 1.28*   * 201* 210*     LFTs    Recent  Labs  Lab 07/01/18  0621 06/29/18  1713   ALKPHOS 100 108   AST 20 22   ALT 26 28   BILITOTAL 1.0 0.9   PROTTOTAL 6.1* 6.5*   ALBUMIN 3.1* 3.4      CBC    Recent Labs  Lab 06/30/18  0655 06/29/18  1713 06/26/18  0717   WBC 7.2 8.3 6.6   RBC 4.39 4.45 4.80   HGB 12.4 12.7 13.7   HCT 37.1 38.3 40.3   MCV 85 86 84   MCH 28.2 28.5 28.5   MCHC 33.4 33.2 34.0   RDW 14.3 14.3 14.2   * 142* 139*     INR    Recent Labs  Lab 06/29/18  1713   INR 1.01     TpnInvalid input(s): TPN    Discussed w/ Eren Yousif MD Mahesh Anantha MD  Cardiology.     I interviewed and examined the patient with the house staff.  I agree with the assessment and plan as documented.      Eren Yousif MD, PhD  Professor of Medicine  Division of Cardiology

## 2018-07-01 NOTE — PROGRESS NOTES
D: Pt admitted 6/29 with chest pressure -- MVD - having CABG tomorrow morning (7/2).     I: Monitored vitals and assessed pt status.   Changed: N/a  Running: NTG @ 0.2 mcg/kg/min  PRN: Flexeril 3x/day    A: A0x4. VSS on RA - needs O2 at night. Afebrile. Some elevated BPs this shift - PRN hydralazine ordered for SBP > 180 - pt hasn't met criteria. Urinating adequately. Good appetite. Up SBA/Ind with cane and tolerating well. Pt showering tonight - signed and held orders are in for surgery tomorrow. Pt eating dinner with s/o and resting.     P: Continue to monitor Pt status and report changes to Cards 1/CVTS team.

## 2018-07-01 NOTE — PLAN OF CARE
Problem: Patient Care Overview  Goal: Plan of Care/Patient Progress Review  RN:  1.Pt will remain hemodynamically stable.    2.Tropinin/EKG WNL.   OT/Edema 6C: Pt continues with 1+ pitting in Ryan LEs, skin not as tight. Moderate edema volume still in Ryan LEs. Skin cares performed and GCBs replaced from MTPs to knee creases. Please remove garment if it gets soiled or if pt c/o LE pain or numbness.

## 2018-07-01 NOTE — PROGRESS NOTES
Pt took new dose of Lantus 15 units this am. About 30 min later, pt reported that she feels flushed, itchy, and SOB and thinking she is having an allergic reaction. Pt appears to be stable, no observed dyspnea. Vitals: HR 80, /80, O2 96% on RA. MD notified. Will continue to monitor closely.      Keila Emmanuel

## 2018-07-02 ENCOUNTER — APPOINTMENT (OUTPATIENT)
Dept: GENERAL RADIOLOGY | Facility: CLINIC | Age: 73
DRG: 234 | End: 2018-07-02
Attending: ANESTHESIOLOGY
Payer: MEDICARE

## 2018-07-02 ENCOUNTER — APPOINTMENT (OUTPATIENT)
Dept: GENERAL RADIOLOGY | Facility: CLINIC | Age: 73
DRG: 234 | End: 2018-07-02
Attending: PHYSICIAN ASSISTANT
Payer: MEDICARE

## 2018-07-02 ENCOUNTER — ANESTHESIA (OUTPATIENT)
Dept: SURGERY | Facility: CLINIC | Age: 73
DRG: 234 | End: 2018-07-02
Payer: MEDICARE

## 2018-07-02 DIAGNOSIS — I50.32 CHRONIC DIASTOLIC HEART FAILURE (H): Primary | ICD-10-CM

## 2018-07-02 PROBLEM — Z95.1 S/P CABG X 3: Status: ACTIVE | Noted: 2018-07-02

## 2018-07-02 LAB
ANION GAP SERPL CALCULATED.3IONS-SCNC: 8 MMOL/L (ref 3–14)
APTT PPP: 33 SEC (ref 22–37)
APTT PPP: 37 SEC (ref 22–37)
BASE DEFICIT BLDA-SCNC: 0.6 MMOL/L
BASE DEFICIT BLDA-SCNC: 1.8 MMOL/L
BASE DEFICIT BLDA-SCNC: 2.8 MMOL/L
BASE DEFICIT BLDA-SCNC: 3.8 MMOL/L
BASE DEFICIT BLDA-SCNC: 4.2 MMOL/L
BASE DEFICIT BLDA-SCNC: 4.6 MMOL/L
BASE DEFICIT BLDA-SCNC: 6.7 MMOL/L
BASE DEFICIT BLDV-SCNC: 2.3 MMOL/L
BASE DEFICIT BLDV-SCNC: 3 MMOL/L
BASE DEFICIT BLDV-SCNC: 4 MMOL/L
BASE DEFICIT BLDV-SCNC: 4.5 MMOL/L
BASE DEFICIT BLDV-SCNC: 5.5 MMOL/L
BASE EXCESS BLDA CALC-SCNC: 0 MMOL/L
BASE EXCESS BLDA CALC-SCNC: 0.5 MMOL/L
BASE EXCESS BLDA CALC-SCNC: 1.1 MMOL/L
BASE EXCESS BLDA CALC-SCNC: 1.6 MMOL/L
BASE EXCESS BLDA CALC-SCNC: 1.8 MMOL/L
BASE EXCESS BLDA CALC-SCNC: 2.8 MMOL/L
BASE EXCESS BLDV CALC-SCNC: 2 MMOL/L
BLD PROD TYP BPU: NORMAL
BLD UNIT ID BPU: 0
BLOOD PRODUCT CODE: NORMAL
BPU ID: NORMAL
BUN SERPL-MCNC: 14 MG/DL (ref 7–30)
CA-I BLD-MCNC: 4.1 MG/DL (ref 4.4–5.2)
CA-I BLD-MCNC: 4.3 MG/DL (ref 4.4–5.2)
CA-I BLD-MCNC: 4.4 MG/DL (ref 4.4–5.2)
CA-I BLD-MCNC: 4.7 MG/DL (ref 4.4–5.2)
CA-I BLD-MCNC: 4.8 MG/DL (ref 4.4–5.2)
CA-I BLD-MCNC: 5.2 MG/DL (ref 4.4–5.2)
CA-I BLD-MCNC: 5.3 MG/DL (ref 4.4–5.2)
CA-I BLD-MCNC: 6.3 MG/DL (ref 4.4–5.2)
CALCIUM SERPL-MCNC: 8 MG/DL (ref 8.5–10.1)
CHLORIDE SERPL-SCNC: 112 MMOL/L (ref 94–109)
CO2 SERPL-SCNC: 23 MMOL/L (ref 20–32)
CREAT SERPL-MCNC: 1.05 MG/DL (ref 0.52–1.04)
ERYTHROCYTE [DISTWIDTH] IN BLOOD BY AUTOMATED COUNT: 14.6 % (ref 10–15)
FIBRINOGEN PPP-MCNC: 240 MG/DL (ref 200–420)
GFR SERPL CREATININE-BSD FRML MDRD: 52 ML/MIN/1.7M2
GLUCOSE BLD-MCNC: 174 MG/DL (ref 70–99)
GLUCOSE BLD-MCNC: 178 MG/DL (ref 70–99)
GLUCOSE BLD-MCNC: 178 MG/DL (ref 70–99)
GLUCOSE BLD-MCNC: 184 MG/DL (ref 70–99)
GLUCOSE BLD-MCNC: 188 MG/DL (ref 70–99)
GLUCOSE BLD-MCNC: 197 MG/DL (ref 70–99)
GLUCOSE BLD-MCNC: 208 MG/DL (ref 70–99)
GLUCOSE BLD-MCNC: 212 MG/DL (ref 70–99)
GLUCOSE BLD-MCNC: 221 MG/DL (ref 70–99)
GLUCOSE BLDC GLUCOMTR-MCNC: 162 MG/DL (ref 70–99)
GLUCOSE BLDC GLUCOMTR-MCNC: 166 MG/DL (ref 70–99)
GLUCOSE BLDC GLUCOMTR-MCNC: 171 MG/DL (ref 70–99)
GLUCOSE BLDC GLUCOMTR-MCNC: 183 MG/DL (ref 70–99)
GLUCOSE BLDC GLUCOMTR-MCNC: 184 MG/DL (ref 70–99)
GLUCOSE BLDC GLUCOMTR-MCNC: 214 MG/DL (ref 70–99)
GLUCOSE SERPL-MCNC: 181 MG/DL (ref 70–99)
HCO3 BLD-SCNC: 21 MMOL/L (ref 21–28)
HCO3 BLD-SCNC: 21 MMOL/L (ref 21–28)
HCO3 BLD-SCNC: 22 MMOL/L (ref 21–28)
HCO3 BLD-SCNC: 22 MMOL/L (ref 21–28)
HCO3 BLD-SCNC: 23 MMOL/L (ref 21–28)
HCO3 BLD-SCNC: 24 MMOL/L (ref 21–28)
HCO3 BLD-SCNC: 25 MMOL/L (ref 21–28)
HCO3 BLD-SCNC: 25 MMOL/L (ref 21–28)
HCO3 BLD-SCNC: 26 MMOL/L (ref 21–28)
HCO3 BLD-SCNC: 27 MMOL/L (ref 21–28)
HCO3 BLD-SCNC: 27 MMOL/L (ref 21–28)
HCO3 BLDV-SCNC: 23 MMOL/L (ref 21–28)
HCO3 BLDV-SCNC: 24 MMOL/L (ref 21–28)
HCO3 BLDV-SCNC: 24 MMOL/L (ref 21–28)
HCO3 BLDV-SCNC: 25 MMOL/L (ref 21–28)
HCO3 BLDV-SCNC: 25 MMOL/L (ref 21–28)
HCO3 BLDV-SCNC: 29 MMOL/L (ref 21–28)
HCT VFR BLD AUTO: 28.4 % (ref 35–47)
HGB BLD-MCNC: 10.1 G/DL (ref 11.7–15.7)
HGB BLD-MCNC: 10.5 G/DL (ref 11.7–15.7)
HGB BLD-MCNC: 10.5 G/DL (ref 11.7–15.7)
HGB BLD-MCNC: 10.8 G/DL (ref 11.7–15.7)
HGB BLD-MCNC: 7.2 G/DL (ref 11.7–15.7)
HGB BLD-MCNC: 7.8 G/DL (ref 11.7–15.7)
HGB BLD-MCNC: 8 G/DL (ref 11.7–15.7)
HGB BLD-MCNC: 8.2 G/DL (ref 11.7–15.7)
HGB BLD-MCNC: 9.4 G/DL (ref 11.7–15.7)
HGB BLD-MCNC: 9.9 G/DL (ref 11.7–15.7)
INR PPP: 1.4 (ref 0.86–1.14)
INR PPP: 1.52 (ref 0.86–1.14)
INTERPRETATION ECG - MUSE: NORMAL
LACTATE BLD-SCNC: 0.7 MMOL/L (ref 0.7–2)
LACTATE BLD-SCNC: 0.7 MMOL/L (ref 0.7–2)
LACTATE BLD-SCNC: 1.2 MMOL/L (ref 0.7–2)
LACTATE BLD-SCNC: 1.3 MMOL/L (ref 0.7–2)
LACTATE BLD-SCNC: 1.4 MMOL/L (ref 0.7–2)
LACTATE BLD-SCNC: 1.5 MMOL/L (ref 0.7–2)
LACTATE BLD-SCNC: 1.5 MMOL/L (ref 0.7–2)
LACTATE BLD-SCNC: 1.9 MMOL/L (ref 0.7–2)
LACTATE BLD-SCNC: 2.6 MMOL/L (ref 0.7–2)
MAGNESIUM SERPL-MCNC: 2.2 MG/DL (ref 1.6–2.3)
MCH RBC QN AUTO: 27.7 PG (ref 26.5–33)
MCHC RBC AUTO-ENTMCNC: 33.1 G/DL (ref 31.5–36.5)
MCV RBC AUTO: 84 FL (ref 78–100)
NUM BPU REQUESTED: 2
NUM BPU REQUESTED: 2
O2/TOTAL GAS SETTING VFR VENT: 100 %
O2/TOTAL GAS SETTING VFR VENT: 100 %
O2/TOTAL GAS SETTING VFR VENT: 40 %
O2/TOTAL GAS SETTING VFR VENT: 50 %
O2/TOTAL GAS SETTING VFR VENT: 50 %
O2/TOTAL GAS SETTING VFR VENT: 55 %
O2/TOTAL GAS SETTING VFR VENT: 63 %
O2/TOTAL GAS SETTING VFR VENT: 70 %
O2/TOTAL GAS SETTING VFR VENT: 75 %
OXYHGB MFR BLD: 95 % (ref 92–100)
OXYHGB MFR BLD: 96 % (ref 92–100)
OXYHGB MFR BLD: 97 % (ref 92–100)
OXYHGB MFR BLDV: 41 %
OXYHGB MFR BLDV: 44 %
OXYHGB MFR BLDV: 45 %
OXYHGB MFR BLDV: 55 %
OXYHGB MFR BLDV: 70 %
PCO2 BLD: 37 MM HG (ref 35–45)
PCO2 BLD: 39 MM HG (ref 35–45)
PCO2 BLD: 39 MM HG (ref 35–45)
PCO2 BLD: 41 MM HG (ref 35–45)
PCO2 BLD: 41 MM HG (ref 35–45)
PCO2 BLD: 44 MM HG (ref 35–45)
PCO2 BLD: 44 MM HG (ref 35–45)
PCO2 BLD: 45 MM HG (ref 35–45)
PCO2 BLD: 45 MM HG (ref 35–45)
PCO2 BLD: 46 MM HG (ref 35–45)
PCO2 BLD: 49 MM HG (ref 35–45)
PCO2 BLD: 49 MM HG (ref 35–45)
PCO2 BLD: 52 MM HG (ref 35–45)
PCO2 BLDV: 51 MM HG (ref 40–50)
PCO2 BLDV: 51 MM HG (ref 40–50)
PCO2 BLDV: 55 MM HG (ref 40–50)
PCO2 BLDV: 62 MM HG (ref 40–50)
PCO2 BLDV: 63 MM HG (ref 40–50)
PCO2 BLDV: 63 MM HG (ref 40–50)
PH BLD: 7.23 PH (ref 7.35–7.45)
PH BLD: 7.3 PH (ref 7.35–7.45)
PH BLD: 7.31 PH (ref 7.35–7.45)
PH BLD: 7.33 PH (ref 7.35–7.45)
PH BLD: 7.33 PH (ref 7.35–7.45)
PH BLD: 7.34 PH (ref 7.35–7.45)
PH BLD: 7.34 PH (ref 7.35–7.45)
PH BLD: 7.35 PH (ref 7.35–7.45)
PH BLD: 7.35 PH (ref 7.35–7.45)
PH BLD: 7.4 PH (ref 7.35–7.45)
PH BLD: 7.42 PH (ref 7.35–7.45)
PH BLD: 7.43 PH (ref 7.35–7.45)
PH BLD: 7.46 PH (ref 7.35–7.45)
PH BLDV: 7.18 PH (ref 7.32–7.43)
PH BLDV: 7.19 PH (ref 7.32–7.43)
PH BLDV: 7.2 PH (ref 7.32–7.43)
PH BLDV: 7.28 PH (ref 7.32–7.43)
PH BLDV: 7.29 PH (ref 7.32–7.43)
PH BLDV: 7.33 PH (ref 7.32–7.43)
PHOSPHATE SERPL-MCNC: 2.7 MG/DL (ref 2.5–4.5)
PLATELET # BLD AUTO: 100 10E9/L (ref 150–450)
PLATELET # BLD AUTO: 63 10E9/L (ref 150–450)
PO2 BLD: 112 MM HG (ref 80–105)
PO2 BLD: 113 MM HG (ref 80–105)
PO2 BLD: 119 MM HG (ref 80–105)
PO2 BLD: 121 MM HG (ref 80–105)
PO2 BLD: 128 MM HG (ref 80–105)
PO2 BLD: 131 MM HG (ref 80–105)
PO2 BLD: 134 MM HG (ref 80–105)
PO2 BLD: 160 MM HG (ref 80–105)
PO2 BLD: 217 MM HG (ref 80–105)
PO2 BLD: 249 MM HG (ref 80–105)
PO2 BLD: 265 MM HG (ref 80–105)
PO2 BLD: 308 MM HG (ref 80–105)
PO2 BLD: 86 MM HG (ref 80–105)
PO2 BLDV: 28 MM HG (ref 25–47)
PO2 BLDV: 29 MM HG (ref 25–47)
PO2 BLDV: 29 MM HG (ref 25–47)
PO2 BLDV: 30 MM HG (ref 25–47)
PO2 BLDV: 41 MM HG (ref 25–47)
PO2 BLDV: 52 MM HG (ref 25–47)
POTASSIUM BLD-SCNC: 2.9 MMOL/L (ref 3.4–5.3)
POTASSIUM BLD-SCNC: 3 MMOL/L (ref 3.4–5.3)
POTASSIUM BLD-SCNC: 3.1 MMOL/L (ref 3.4–5.3)
POTASSIUM BLD-SCNC: 3.1 MMOL/L (ref 3.4–5.3)
POTASSIUM BLD-SCNC: 3.3 MMOL/L (ref 3.4–5.3)
POTASSIUM BLD-SCNC: 3.5 MMOL/L (ref 3.4–5.3)
POTASSIUM BLD-SCNC: 3.5 MMOL/L (ref 3.4–5.3)
POTASSIUM BLD-SCNC: 3.9 MMOL/L (ref 3.4–5.3)
POTASSIUM BLD-SCNC: 4.7 MMOL/L (ref 3.4–5.3)
POTASSIUM SERPL-SCNC: 3.3 MMOL/L (ref 3.4–5.3)
RBC # BLD AUTO: 3.39 10E12/L (ref 3.8–5.2)
SODIUM BLD-SCNC: 139 MMOL/L (ref 133–144)
SODIUM BLD-SCNC: 140 MMOL/L (ref 133–144)
SODIUM BLD-SCNC: 141 MMOL/L (ref 133–144)
SODIUM SERPL-SCNC: 144 MMOL/L (ref 133–144)
TRANSFUSION STATUS PATIENT QL: NORMAL
WBC # BLD AUTO: 16.3 10E9/L (ref 4–11)

## 2018-07-02 PROCEDURE — 25000128 H RX IP 250 OP 636: Performed by: THORACIC SURGERY (CARDIOTHORACIC VASCULAR SURGERY)

## 2018-07-02 PROCEDURE — 25000128 H RX IP 250 OP 636: Performed by: STUDENT IN AN ORGANIZED HEALTH CARE EDUCATION/TRAINING PROGRAM

## 2018-07-02 PROCEDURE — 93010 ELECTROCARDIOGRAM REPORT: CPT | Performed by: INTERNAL MEDICINE

## 2018-07-02 PROCEDURE — A9270 NON-COVERED ITEM OR SERVICE: HCPCS | Mod: GY | Performed by: PHYSICIAN ASSISTANT

## 2018-07-02 PROCEDURE — 25800025 ZZH RX 258: Performed by: PHYSICIAN ASSISTANT

## 2018-07-02 PROCEDURE — 25000125 ZZHC RX 250: Performed by: THORACIC SURGERY (CARDIOTHORACIC VASCULAR SURGERY)

## 2018-07-02 PROCEDURE — 37000009 ZZH ANESTHESIA TECHNICAL FEE, EACH ADDTL 15 MIN: Performed by: THORACIC SURGERY (CARDIOTHORACIC VASCULAR SURGERY)

## 2018-07-02 PROCEDURE — 84100 ASSAY OF PHOSPHORUS: CPT | Performed by: PHYSICIAN ASSISTANT

## 2018-07-02 PROCEDURE — P9059 PLASMA, FRZ BETWEEN 8-24HOUR: HCPCS | Performed by: INTERNAL MEDICINE

## 2018-07-02 PROCEDURE — 25000128 H RX IP 250 OP 636: Performed by: NURSE ANESTHETIST, CERTIFIED REGISTERED

## 2018-07-02 PROCEDURE — 27210995 ZZH RX 272: Performed by: THORACIC SURGERY (CARDIOTHORACIC VASCULAR SURGERY)

## 2018-07-02 PROCEDURE — 84132 ASSAY OF SERUM POTASSIUM: CPT | Performed by: INTERNAL MEDICINE

## 2018-07-02 PROCEDURE — 83605 ASSAY OF LACTIC ACID: CPT | Performed by: PHYSICIAN ASSISTANT

## 2018-07-02 PROCEDURE — P9041 ALBUMIN (HUMAN),5%, 50ML: HCPCS | Performed by: PHYSICIAN ASSISTANT

## 2018-07-02 PROCEDURE — 27210447 ZZH PACK CELL SAVER CSP: Performed by: THORACIC SURGERY (CARDIOTHORACIC VASCULAR SURGERY)

## 2018-07-02 PROCEDURE — 80048 BASIC METABOLIC PNL TOTAL CA: CPT | Performed by: PHYSICIAN ASSISTANT

## 2018-07-02 PROCEDURE — 25000125 ZZHC RX 250: Performed by: NURSE ANESTHETIST, CERTIFIED REGISTERED

## 2018-07-02 PROCEDURE — 83605 ASSAY OF LACTIC ACID: CPT | Performed by: ANESTHESIOLOGY

## 2018-07-02 PROCEDURE — 85610 PROTHROMBIN TIME: CPT | Performed by: PHYSICIAN ASSISTANT

## 2018-07-02 PROCEDURE — 27210460 ZZH PUMP APP ADULT PERFUSION: Performed by: THORACIC SURGERY (CARDIOTHORACIC VASCULAR SURGERY)

## 2018-07-02 PROCEDURE — 82947 ASSAY GLUCOSE BLOOD QUANT: CPT | Performed by: INTERNAL MEDICINE

## 2018-07-02 PROCEDURE — P9037 PLATE PHERES LEUKOREDU IRRAD: HCPCS | Performed by: INTERNAL MEDICINE

## 2018-07-02 PROCEDURE — 27210794 ZZH OR GENERAL SUPPLY STERILE: Performed by: THORACIC SURGERY (CARDIOTHORACIC VASCULAR SURGERY)

## 2018-07-02 PROCEDURE — C9248 INJ, CLEVIDIPINE BUTYRATE: HCPCS | Performed by: NURSE ANESTHETIST, CERTIFIED REGISTERED

## 2018-07-02 PROCEDURE — 82803 BLOOD GASES ANY COMBINATION: CPT | Performed by: INTERNAL MEDICINE

## 2018-07-02 PROCEDURE — 83735 ASSAY OF MAGNESIUM: CPT | Performed by: PHYSICIAN ASSISTANT

## 2018-07-02 PROCEDURE — 36000074 ZZH SURGERY LEVEL 6 1ST 30 MIN - UMMC: Performed by: THORACIC SURGERY (CARDIOTHORACIC VASCULAR SURGERY)

## 2018-07-02 PROCEDURE — 40000014 ZZH STATISTIC ARTERIAL MONITORING DAILY

## 2018-07-02 PROCEDURE — 85610 PROTHROMBIN TIME: CPT | Performed by: INTERNAL MEDICINE

## 2018-07-02 PROCEDURE — 82803 BLOOD GASES ANY COMBINATION: CPT | Performed by: ANESTHESIOLOGY

## 2018-07-02 PROCEDURE — 94640 AIRWAY INHALATION TREATMENT: CPT | Mod: 76

## 2018-07-02 PROCEDURE — 99291 CRITICAL CARE FIRST HOUR: CPT | Mod: GC | Performed by: ANESTHESIOLOGY

## 2018-07-02 PROCEDURE — 93005 ELECTROCARDIOGRAM TRACING: CPT

## 2018-07-02 PROCEDURE — 82947 ASSAY GLUCOSE BLOOD QUANT: CPT | Performed by: ANESTHESIOLOGY

## 2018-07-02 PROCEDURE — 94640 AIRWAY INHALATION TREATMENT: CPT

## 2018-07-02 PROCEDURE — 85027 COMPLETE CBC AUTOMATED: CPT | Performed by: PHYSICIAN ASSISTANT

## 2018-07-02 PROCEDURE — P9016 RBC LEUKOCYTES REDUCED: HCPCS | Performed by: PHYSICIAN ASSISTANT

## 2018-07-02 PROCEDURE — 25000132 ZZH RX MED GY IP 250 OP 250 PS 637: Mod: GY | Performed by: PHYSICIAN ASSISTANT

## 2018-07-02 PROCEDURE — 85730 THROMBOPLASTIN TIME PARTIAL: CPT | Performed by: INTERNAL MEDICINE

## 2018-07-02 PROCEDURE — 84132 ASSAY OF SERUM POTASSIUM: CPT | Performed by: ANESTHESIOLOGY

## 2018-07-02 PROCEDURE — 25000128 H RX IP 250 OP 636: Performed by: PHYSICIAN ASSISTANT

## 2018-07-02 PROCEDURE — 85384 FIBRINOGEN ACTIVITY: CPT | Performed by: INTERNAL MEDICINE

## 2018-07-02 PROCEDURE — 37000008 ZZH ANESTHESIA TECHNICAL FEE, 1ST 30 MIN: Performed by: THORACIC SURGERY (CARDIOTHORACIC VASCULAR SURGERY)

## 2018-07-02 PROCEDURE — 82330 ASSAY OF CALCIUM: CPT | Performed by: PHYSICIAN ASSISTANT

## 2018-07-02 PROCEDURE — 36000076 ZZH SURGERY LEVEL 6 EA 15 ADDTL MIN - UMMC: Performed by: THORACIC SURGERY (CARDIOTHORACIC VASCULAR SURGERY)

## 2018-07-02 PROCEDURE — 25000125 ZZHC RX 250: Performed by: STUDENT IN AN ORGANIZED HEALTH CARE EDUCATION/TRAINING PROGRAM

## 2018-07-02 PROCEDURE — 20000004 ZZH R&B ICU UMMC

## 2018-07-02 PROCEDURE — 84295 ASSAY OF SERUM SODIUM: CPT | Performed by: INTERNAL MEDICINE

## 2018-07-02 PROCEDURE — 25000132 ZZH RX MED GY IP 250 OP 250 PS 637: Mod: GY | Performed by: STUDENT IN AN ORGANIZED HEALTH CARE EDUCATION/TRAINING PROGRAM

## 2018-07-02 PROCEDURE — C9399 UNCLASSIFIED DRUGS OR BIOLOG: HCPCS | Performed by: NURSE ANESTHETIST, CERTIFIED REGISTERED

## 2018-07-02 PROCEDURE — 82805 BLOOD GASES W/O2 SATURATION: CPT | Performed by: PHYSICIAN ASSISTANT

## 2018-07-02 PROCEDURE — 40000196 ZZH STATISTIC RAPCV CVP MONITORING

## 2018-07-02 PROCEDURE — 41000019 ZZH PERA-PERFUSION EACH ADDTL 15 MIN: Performed by: THORACIC SURGERY (CARDIOTHORACIC VASCULAR SURGERY)

## 2018-07-02 PROCEDURE — 25000125 ZZHC RX 250: Performed by: PHYSICIAN ASSISTANT

## 2018-07-02 PROCEDURE — 82330 ASSAY OF CALCIUM: CPT | Performed by: INTERNAL MEDICINE

## 2018-07-02 PROCEDURE — 25000565 ZZH ISOFLURANE, EA 15 MIN: Performed by: THORACIC SURGERY (CARDIOTHORACIC VASCULAR SURGERY)

## 2018-07-02 PROCEDURE — 40000986 XR CHEST PORT 1 VW

## 2018-07-02 PROCEDURE — 41000018 ZZH PER-PERFUSION 1ST 30 MIN: Performed by: THORACIC SURGERY (CARDIOTHORACIC VASCULAR SURGERY)

## 2018-07-02 PROCEDURE — 40000275 ZZH STATISTIC RCP TIME EA 10 MIN

## 2018-07-02 PROCEDURE — A9270 NON-COVERED ITEM OR SERVICE: HCPCS | Mod: GY | Performed by: STUDENT IN AN ORGANIZED HEALTH CARE EDUCATION/TRAINING PROGRAM

## 2018-07-02 PROCEDURE — 00000146 ZZHCL STATISTIC GLUCOSE BY METER IP

## 2018-07-02 PROCEDURE — 74018 RADEX ABDOMEN 1 VIEW: CPT

## 2018-07-02 PROCEDURE — 84295 ASSAY OF SERUM SODIUM: CPT | Performed by: ANESTHESIOLOGY

## 2018-07-02 PROCEDURE — 85730 THROMBOPLASTIN TIME PARTIAL: CPT | Performed by: PHYSICIAN ASSISTANT

## 2018-07-02 PROCEDURE — 40000048 ZZH STATISTIC DAILY SWAN MONITORING

## 2018-07-02 PROCEDURE — 94002 VENT MGMT INPAT INIT DAY: CPT

## 2018-07-02 PROCEDURE — 93503 INSERT/PLACE HEART CATHETER: CPT

## 2018-07-02 PROCEDURE — 85049 AUTOMATED PLATELET COUNT: CPT | Performed by: INTERNAL MEDICINE

## 2018-07-02 PROCEDURE — 82330 ASSAY OF CALCIUM: CPT | Performed by: ANESTHESIOLOGY

## 2018-07-02 RX ORDER — OXYCODONE HYDROCHLORIDE 5 MG/1
5-10 TABLET ORAL EVERY 4 HOURS PRN
Status: DISCONTINUED | OUTPATIENT
Start: 2018-07-02 | End: 2018-07-03

## 2018-07-02 RX ORDER — LIDOCAINE 4 G/G
1 PATCH TOPICAL
Status: DISCONTINUED | OUTPATIENT
Start: 2018-07-03 | End: 2018-07-06

## 2018-07-02 RX ORDER — MAGNESIUM SULFATE HEPTAHYDRATE 40 MG/ML
2 INJECTION, SOLUTION INTRAVENOUS DAILY PRN
Status: DISCONTINUED | OUTPATIENT
Start: 2018-07-02 | End: 2018-07-13 | Stop reason: HOSPADM

## 2018-07-02 RX ORDER — POTASSIUM CHLORIDE 29.8 MG/ML
INJECTION INTRAVENOUS PRN
Status: DISCONTINUED | OUTPATIENT
Start: 2018-07-02 | End: 2018-07-02

## 2018-07-02 RX ORDER — ASPIRIN 325 MG
325 TABLET ORAL DAILY
Status: DISCONTINUED | OUTPATIENT
Start: 2018-07-03 | End: 2018-07-04

## 2018-07-02 RX ORDER — PAPAVERINE HYDROCHLORIDE 30 MG/ML
INJECTION INTRAMUSCULAR; INTRAVENOUS PRN
Status: DISCONTINUED | OUTPATIENT
Start: 2018-07-02 | End: 2018-07-02 | Stop reason: HOSPADM

## 2018-07-02 RX ORDER — ALBUTEROL SULFATE 90 UG/1
6 AEROSOL, METERED RESPIRATORY (INHALATION) EVERY 4 HOURS
Status: DISCONTINUED | OUTPATIENT
Start: 2018-07-02 | End: 2018-07-03

## 2018-07-02 RX ORDER — HEPARIN SODIUM 1000 [USP'U]/ML
INJECTION, SOLUTION INTRAVENOUS; SUBCUTANEOUS PRN
Status: DISCONTINUED | OUTPATIENT
Start: 2018-07-02 | End: 2018-07-02

## 2018-07-02 RX ORDER — BISACODYL 5 MG
10 TABLET, DELAYED RELEASE (ENTERIC COATED) ORAL DAILY PRN
Status: DISCONTINUED | OUTPATIENT
Start: 2018-07-02 | End: 2018-07-13 | Stop reason: HOSPADM

## 2018-07-02 RX ORDER — HYDROMORPHONE HYDROCHLORIDE 1 MG/ML
.3-.5 INJECTION, SOLUTION INTRAMUSCULAR; INTRAVENOUS; SUBCUTANEOUS
Status: DISCONTINUED | OUTPATIENT
Start: 2018-07-02 | End: 2018-07-13 | Stop reason: HOSPADM

## 2018-07-02 RX ORDER — SODIUM CHLORIDE 9 MG/ML
INJECTION, SOLUTION INTRAVENOUS CONTINUOUS PRN
Status: DISCONTINUED | OUTPATIENT
Start: 2018-07-02 | End: 2018-07-02

## 2018-07-02 RX ORDER — FENTANYL CITRATE 50 UG/ML
INJECTION, SOLUTION INTRAMUSCULAR; INTRAVENOUS PRN
Status: DISCONTINUED | OUTPATIENT
Start: 2018-07-02 | End: 2018-07-02

## 2018-07-02 RX ORDER — ALBUMIN, HUMAN INJ 5% 5 %
500-1000 SOLUTION INTRAVENOUS
Status: COMPLETED | OUTPATIENT
Start: 2018-07-02 | End: 2018-07-02

## 2018-07-02 RX ORDER — AMOXICILLIN 250 MG
1 CAPSULE ORAL 2 TIMES DAILY
Status: DISCONTINUED | OUTPATIENT
Start: 2018-07-02 | End: 2018-07-13 | Stop reason: HOSPADM

## 2018-07-02 RX ORDER — LIDOCAINE HYDROCHLORIDE 20 MG/ML
INJECTION, SOLUTION INFILTRATION; PERINEURAL PRN
Status: DISCONTINUED | OUTPATIENT
Start: 2018-07-02 | End: 2018-07-02

## 2018-07-02 RX ORDER — MAGNESIUM SULFATE HEPTAHYDRATE 40 MG/ML
4 INJECTION, SOLUTION INTRAVENOUS EVERY 4 HOURS PRN
Status: DISCONTINUED | OUTPATIENT
Start: 2018-07-02 | End: 2018-07-13 | Stop reason: HOSPADM

## 2018-07-02 RX ORDER — ETOMIDATE 2 MG/ML
INJECTION INTRAVENOUS PRN
Status: DISCONTINUED | OUTPATIENT
Start: 2018-07-02 | End: 2018-07-02

## 2018-07-02 RX ORDER — DEXTROSE MONOHYDRATE 25 G/50ML
25-50 INJECTION, SOLUTION INTRAVENOUS
Status: CANCELLED | OUTPATIENT
Start: 2018-07-02

## 2018-07-02 RX ORDER — NICOTINE POLACRILEX 4 MG
15-30 LOZENGE BUCCAL
Status: CANCELLED | OUTPATIENT
Start: 2018-07-02

## 2018-07-02 RX ORDER — LIDOCAINE 40 MG/G
CREAM TOPICAL
Status: DISCONTINUED | OUTPATIENT
Start: 2018-07-02 | End: 2018-07-13 | Stop reason: HOSPADM

## 2018-07-02 RX ORDER — CEFAZOLIN SODIUM 1 G/3ML
INJECTION, POWDER, FOR SOLUTION INTRAMUSCULAR; INTRAVENOUS PRN
Status: DISCONTINUED | OUTPATIENT
Start: 2018-07-02 | End: 2018-07-02

## 2018-07-02 RX ORDER — BISACODYL 5 MG
5 TABLET, DELAYED RELEASE (ENTERIC COATED) ORAL DAILY PRN
Status: DISCONTINUED | OUTPATIENT
Start: 2018-07-02 | End: 2018-07-13 | Stop reason: HOSPADM

## 2018-07-02 RX ORDER — NITROGLYCERIN 10 MG/100ML
INJECTION INTRAVENOUS PRN
Status: DISCONTINUED | OUTPATIENT
Start: 2018-07-02 | End: 2018-07-02

## 2018-07-02 RX ORDER — PROCHLORPERAZINE MALEATE 5 MG
5 TABLET ORAL EVERY 6 HOURS PRN
Status: DISCONTINUED | OUTPATIENT
Start: 2018-07-02 | End: 2018-07-13 | Stop reason: HOSPADM

## 2018-07-02 RX ORDER — ACETAMINOPHEN 325 MG/1
650 TABLET ORAL EVERY 4 HOURS PRN
Status: DISCONTINUED | OUTPATIENT
Start: 2018-07-05 | End: 2018-07-06

## 2018-07-02 RX ORDER — NALOXONE HYDROCHLORIDE 0.4 MG/ML
.1-.4 INJECTION, SOLUTION INTRAMUSCULAR; INTRAVENOUS; SUBCUTANEOUS
Status: DISCONTINUED | OUTPATIENT
Start: 2018-07-02 | End: 2018-07-13 | Stop reason: HOSPADM

## 2018-07-02 RX ORDER — HYDRALAZINE HYDROCHLORIDE 20 MG/ML
10 INJECTION INTRAMUSCULAR; INTRAVENOUS EVERY 30 MIN PRN
Status: DISCONTINUED | OUTPATIENT
Start: 2018-07-02 | End: 2018-07-13 | Stop reason: HOSPADM

## 2018-07-02 RX ORDER — AMOXICILLIN 250 MG
2 CAPSULE ORAL 2 TIMES DAILY
Status: DISCONTINUED | OUTPATIENT
Start: 2018-07-02 | End: 2018-07-13 | Stop reason: HOSPADM

## 2018-07-02 RX ORDER — POTASSIUM CHLORIDE 29.8 MG/ML
20 INJECTION INTRAVENOUS
Status: DISCONTINUED | OUTPATIENT
Start: 2018-07-02 | End: 2018-07-13 | Stop reason: HOSPADM

## 2018-07-02 RX ORDER — DEXTROSE MONOHYDRATE 25 G/50ML
25-50 INJECTION, SOLUTION INTRAVENOUS
Status: DISCONTINUED | OUTPATIENT
Start: 2018-07-02 | End: 2018-07-13 | Stop reason: HOSPADM

## 2018-07-02 RX ORDER — POTASSIUM CHLORIDE 1.5 G/1.58G
20-40 POWDER, FOR SOLUTION ORAL
Status: DISCONTINUED | OUTPATIENT
Start: 2018-07-02 | End: 2018-07-13 | Stop reason: HOSPADM

## 2018-07-02 RX ORDER — ACETAMINOPHEN 325 MG/1
975 TABLET ORAL EVERY 8 HOURS
Status: DISCONTINUED | OUTPATIENT
Start: 2018-07-02 | End: 2018-07-03

## 2018-07-02 RX ORDER — SODIUM CHLORIDE, SODIUM LACTATE, POTASSIUM CHLORIDE, CALCIUM CHLORIDE 600; 310; 30; 20 MG/100ML; MG/100ML; MG/100ML; MG/100ML
INJECTION, SOLUTION INTRAVENOUS CONTINUOUS PRN
Status: DISCONTINUED | OUTPATIENT
Start: 2018-07-02 | End: 2018-07-02

## 2018-07-02 RX ORDER — ONDANSETRON 2 MG/ML
4 INJECTION INTRAMUSCULAR; INTRAVENOUS EVERY 6 HOURS PRN
Status: DISCONTINUED | OUTPATIENT
Start: 2018-07-02 | End: 2018-07-13 | Stop reason: HOSPADM

## 2018-07-02 RX ORDER — AMOXICILLIN 250 MG
2 CAPSULE ORAL 2 TIMES DAILY
Status: DISCONTINUED | OUTPATIENT
Start: 2018-07-02 | End: 2018-07-02

## 2018-07-02 RX ORDER — CALCIUM CHLORIDE 100 MG/ML
INJECTION INTRAVENOUS; INTRAVENTRICULAR PRN
Status: DISCONTINUED | OUTPATIENT
Start: 2018-07-02 | End: 2018-07-02

## 2018-07-02 RX ORDER — NICOTINE POLACRILEX 4 MG
15-30 LOZENGE BUCCAL
Status: DISCONTINUED | OUTPATIENT
Start: 2018-07-02 | End: 2018-07-13 | Stop reason: HOSPADM

## 2018-07-02 RX ORDER — PROTAMINE SULFATE 10 MG/ML
INJECTION, SOLUTION INTRAVENOUS PRN
Status: DISCONTINUED | OUTPATIENT
Start: 2018-07-02 | End: 2018-07-02

## 2018-07-02 RX ORDER — NALOXONE HYDROCHLORIDE 0.4 MG/ML
.1-.4 INJECTION, SOLUTION INTRAMUSCULAR; INTRAVENOUS; SUBCUTANEOUS
Status: DISCONTINUED | OUTPATIENT
Start: 2018-07-02 | End: 2018-07-02

## 2018-07-02 RX ORDER — POTASSIUM CL/LIDO/0.9 % NACL 10MEQ/0.1L
10 INTRAVENOUS SOLUTION, PIGGYBACK (ML) INTRAVENOUS
Status: DISCONTINUED | OUTPATIENT
Start: 2018-07-02 | End: 2018-07-13 | Stop reason: HOSPADM

## 2018-07-02 RX ORDER — POTASSIUM CHLORIDE 750 MG/1
20-40 TABLET, EXTENDED RELEASE ORAL
Status: DISCONTINUED | OUTPATIENT
Start: 2018-07-02 | End: 2018-07-13 | Stop reason: HOSPADM

## 2018-07-02 RX ORDER — ONDANSETRON 4 MG/1
4 TABLET, ORALLY DISINTEGRATING ORAL EVERY 6 HOURS PRN
Status: DISCONTINUED | OUTPATIENT
Start: 2018-07-02 | End: 2018-07-13 | Stop reason: HOSPADM

## 2018-07-02 RX ORDER — ONDANSETRON 2 MG/ML
INJECTION INTRAMUSCULAR; INTRAVENOUS PRN
Status: DISCONTINUED | OUTPATIENT
Start: 2018-07-02 | End: 2018-07-02

## 2018-07-02 RX ORDER — MEPERIDINE HYDROCHLORIDE 50 MG/ML
12.5-25 INJECTION INTRAMUSCULAR; INTRAVENOUS; SUBCUTANEOUS
Status: DISCONTINUED | OUTPATIENT
Start: 2018-07-02 | End: 2018-07-07

## 2018-07-02 RX ORDER — MUPIROCIN 20 MG/G
0.5 OINTMENT TOPICAL 2 TIMES DAILY
Status: DISCONTINUED | OUTPATIENT
Start: 2018-07-02 | End: 2018-07-06

## 2018-07-02 RX ORDER — ALBUTEROL SULFATE 0.83 MG/ML
2.5 SOLUTION RESPIRATORY (INHALATION)
Status: DISCONTINUED | OUTPATIENT
Start: 2018-07-02 | End: 2018-07-13 | Stop reason: HOSPADM

## 2018-07-02 RX ORDER — CLINDAMYCIN PHOSPHATE 900 MG/50ML
900 INJECTION, SOLUTION INTRAVENOUS EVERY 8 HOURS
Status: COMPLETED | OUTPATIENT
Start: 2018-07-02 | End: 2018-07-03

## 2018-07-02 RX ORDER — POTASSIUM CHLORIDE 7.45 MG/ML
10 INJECTION INTRAVENOUS
Status: DISCONTINUED | OUTPATIENT
Start: 2018-07-02 | End: 2018-07-13 | Stop reason: HOSPADM

## 2018-07-02 RX ORDER — DEXTROSE MONOHYDRATE, SODIUM CHLORIDE, AND POTASSIUM CHLORIDE 50; 1.49; 4.5 G/1000ML; G/1000ML; G/1000ML
INJECTION, SOLUTION INTRAVENOUS CONTINUOUS
Status: DISCONTINUED | OUTPATIENT
Start: 2018-07-02 | End: 2018-07-11

## 2018-07-02 RX ORDER — BISACODYL 5 MG
15 TABLET, DELAYED RELEASE (ENTERIC COATED) ORAL DAILY PRN
Status: DISCONTINUED | OUTPATIENT
Start: 2018-07-02 | End: 2018-07-13 | Stop reason: HOSPADM

## 2018-07-02 RX ORDER — AMOXICILLIN 250 MG
1 CAPSULE ORAL 2 TIMES DAILY
Status: DISCONTINUED | OUTPATIENT
Start: 2018-07-02 | End: 2018-07-02

## 2018-07-02 RX ADMIN — Medication: at 14:50

## 2018-07-02 RX ADMIN — PROTAMINE SULFATE 5 MG: 10 INJECTION, SOLUTION INTRAVENOUS at 14:19

## 2018-07-02 RX ADMIN — HYDROMORPHONE HYDROCHLORIDE 0.5 MG: 1 INJECTION, SOLUTION INTRAMUSCULAR; INTRAVENOUS; SUBCUTANEOUS at 17:55

## 2018-07-02 RX ADMIN — NOREPINEPHRINE BITARTRATE 6.4 MCG: 1 INJECTION INTRAVENOUS at 14:27

## 2018-07-02 RX ADMIN — CALCIUM CHLORIDE 500 MG: 100 INJECTION, SOLUTION INTRAVENOUS at 14:25

## 2018-07-02 RX ADMIN — POTASSIUM CHLORIDE 20 MEQ: 400 INJECTION, SOLUTION INTRAVENOUS at 15:48

## 2018-07-02 RX ADMIN — SODIUM CHLORIDE: 9 INJECTION, SOLUTION INTRAVENOUS at 08:50

## 2018-07-02 RX ADMIN — POTASSIUM CHLORIDE 40 MEQ: 1.5 POWDER, FOR SOLUTION ORAL at 17:55

## 2018-07-02 RX ADMIN — FENTANYL CITRATE 250 MCG: 50 INJECTION, SOLUTION INTRAMUSCULAR; INTRAVENOUS at 14:22

## 2018-07-02 RX ADMIN — NITROGLYCERIN 50 MCG: 10 INJECTION INTRAVENOUS at 12:19

## 2018-07-02 RX ADMIN — EPINEPHRINE 0.05 MCG/KG/MIN: 1 INJECTION PARENTERAL at 14:12

## 2018-07-02 RX ADMIN — PREGABALIN 100 MG: 100 CAPSULE ORAL at 20:11

## 2018-07-02 RX ADMIN — ROCURONIUM BROMIDE 50 MG: 10 INJECTION INTRAVENOUS at 09:24

## 2018-07-02 RX ADMIN — FENTANYL CITRATE 250 MCG: 50 INJECTION, SOLUTION INTRAMUSCULAR; INTRAVENOUS at 09:19

## 2018-07-02 RX ADMIN — FENTANYL CITRATE 250 MCG: 50 INJECTION, SOLUTION INTRAMUSCULAR; INTRAVENOUS at 14:37

## 2018-07-02 RX ADMIN — ETOMIDATE 24 MG: 2 INJECTION INTRAVENOUS at 08:12

## 2018-07-02 RX ADMIN — NITROGLYCERIN 50 MCG: 10 INJECTION INTRAVENOUS at 09:21

## 2018-07-02 RX ADMIN — LIDOCAINE HYDROCHLORIDE 100 MG: 20 INJECTION, SOLUTION INFILTRATION; PERINEURAL at 08:11

## 2018-07-02 RX ADMIN — POTASSIUM CHLORIDE, DEXTROSE MONOHYDRATE AND SODIUM CHLORIDE: 150; 5; 450 INJECTION, SOLUTION INTRAVENOUS at 16:28

## 2018-07-02 RX ADMIN — NOREPINEPHRINE BITARTRATE 6.4 MCG: 1 INJECTION INTRAVENOUS at 14:28

## 2018-07-02 RX ADMIN — CLEVIDIPINE 0.12 MG: 0.5 EMULSION INTRAVENOUS at 12:21

## 2018-07-02 RX ADMIN — ACETAMINOPHEN 975 MG: 325 TABLET, FILM COATED ORAL at 17:55

## 2018-07-02 RX ADMIN — POTASSIUM CHLORIDE 20 MEQ: 1.5 POWDER, FOR SOLUTION ORAL at 22:41

## 2018-07-02 RX ADMIN — AMINOCAPROIC ACID 5 G: 250 INJECTION, SOLUTION INTRAVENOUS at 08:39

## 2018-07-02 RX ADMIN — NITROGLYCERIN 50 MCG: 10 INJECTION INTRAVENOUS at 12:23

## 2018-07-02 RX ADMIN — ACETAMINOPHEN 975 MG: 325 TABLET, FILM COATED ORAL at 23:36

## 2018-07-02 RX ADMIN — NITROGLYCERIN 100 MCG: 10 INJECTION INTRAVENOUS at 09:28

## 2018-07-02 RX ADMIN — ROCURONIUM BROMIDE 50 MG: 10 INJECTION INTRAVENOUS at 13:38

## 2018-07-02 RX ADMIN — SODIUM CHLORIDE, POTASSIUM CHLORIDE, SODIUM LACTATE AND CALCIUM CHLORIDE: 600; 310; 30; 20 INJECTION, SOLUTION INTRAVENOUS at 08:39

## 2018-07-02 RX ADMIN — EPINEPHRINE 10 MCG: 1 INJECTION PARENTERAL at 14:30

## 2018-07-02 RX ADMIN — MUPIROCIN 0.5 G: 20 OINTMENT TOPICAL at 22:41

## 2018-07-02 RX ADMIN — CALCIUM CHLORIDE 500 MG: 100 INJECTION, SOLUTION INTRAVENOUS at 14:20

## 2018-07-02 RX ADMIN — NOREPINEPHRINE BITARTRATE 6.4 MCG: 1 INJECTION INTRAVENOUS at 12:31

## 2018-07-02 RX ADMIN — NOREPINEPHRINE BITARTRATE 6.4 MCG: 1 INJECTION INTRAVENOUS at 14:34

## 2018-07-02 RX ADMIN — HEPARIN SODIUM 5000 UNITS: 5000 INJECTION, SOLUTION INTRAVENOUS; SUBCUTANEOUS at 02:02

## 2018-07-02 RX ADMIN — ROCURONIUM BROMIDE 100 MG: 10 INJECTION INTRAVENOUS at 08:15

## 2018-07-02 RX ADMIN — CEFAZOLIN 1 G: 1 INJECTION, POWDER, FOR SOLUTION INTRAMUSCULAR; INTRAVENOUS at 12:45

## 2018-07-02 RX ADMIN — PROTAMINE SULFATE 245 MG: 10 INJECTION, SOLUTION INTRAVENOUS at 14:25

## 2018-07-02 RX ADMIN — CLEVIDIPINE 2 MG/HR: 0.5 EMULSION INTRAVENOUS at 09:42

## 2018-07-02 RX ADMIN — ONDANSETRON 4 MG: 2 INJECTION INTRAMUSCULAR; INTRAVENOUS at 09:00

## 2018-07-02 RX ADMIN — EPINEPHRINE 10 MCG: 1 INJECTION PARENTERAL at 14:27

## 2018-07-02 RX ADMIN — POTASSIUM PHOSPHATE, MONOBASIC AND POTASSIUM PHOSPHATE, DIBASIC 10 MMOL: 224; 236 INJECTION, SOLUTION INTRAVENOUS at 19:40

## 2018-07-02 RX ADMIN — ALBUTEROL SULFATE 6 PUFF: 90 AEROSOL, METERED RESPIRATORY (INHALATION) at 20:14

## 2018-07-02 RX ADMIN — SENNOSIDES AND DOCUSATE SODIUM 2 TABLET: 8.6; 5 TABLET ORAL at 20:11

## 2018-07-02 RX ADMIN — FENTANYL CITRATE 500 MCG: 50 INJECTION, SOLUTION INTRAMUSCULAR; INTRAVENOUS at 08:14

## 2018-07-02 RX ADMIN — ATORVASTATIN CALCIUM 40 MG: 40 TABLET, FILM COATED ORAL at 20:11

## 2018-07-02 RX ADMIN — HEPARIN SODIUM 5000 UNITS: 5000 INJECTION, SOLUTION INTRAVENOUS; SUBCUTANEOUS at 23:20

## 2018-07-02 RX ADMIN — PANTOPRAZOLE SODIUM 40 MG: 40 INJECTION, POWDER, FOR SOLUTION INTRAVENOUS at 17:43

## 2018-07-02 RX ADMIN — ROCURONIUM BROMIDE 50 MG: 10 INJECTION INTRAVENOUS at 12:00

## 2018-07-02 RX ADMIN — NITROGLYCERIN 100 MCG: 10 INJECTION INTRAVENOUS at 08:41

## 2018-07-02 RX ADMIN — HEPARIN SODIUM 55000 UNITS: 1000 INJECTION, SOLUTION INTRAVENOUS; SUBCUTANEOUS at 11:52

## 2018-07-02 RX ADMIN — CEFAZOLIN 3 G: 1 INJECTION, POWDER, FOR SOLUTION INTRAMUSCULAR; INTRAVENOUS at 08:45

## 2018-07-02 RX ADMIN — HEPARIN SODIUM 5000 UNITS: 5000 INJECTION, SOLUTION INTRAVENOUS; SUBCUTANEOUS at 17:42

## 2018-07-02 RX ADMIN — MIDAZOLAM 1 MG: 1 INJECTION INTRAMUSCULAR; INTRAVENOUS at 13:38

## 2018-07-02 RX ADMIN — EPINEPHRINE 10 MCG: 1 INJECTION PARENTERAL at 14:32

## 2018-07-02 RX ADMIN — HUMAN INSULIN 12 UNITS/HR: 100 INJECTION, SOLUTION SUBCUTANEOUS at 23:20

## 2018-07-02 RX ADMIN — NOREPINEPHRINE BITARTRATE 6.4 MCG: 1 INJECTION INTRAVENOUS at 14:14

## 2018-07-02 RX ADMIN — ALBUMIN HUMAN 500 ML: 0.05 INJECTION, SOLUTION INTRAVENOUS at 16:39

## 2018-07-02 RX ADMIN — DEXMEDETOMIDINE HYDROCHLORIDE 0.4 MCG/KG/HR: 100 INJECTION, SOLUTION INTRAVENOUS at 09:42

## 2018-07-02 RX ADMIN — SUGAMMADEX 400 MG: 100 INJECTION, SOLUTION INTRAVENOUS at 15:56

## 2018-07-02 RX ADMIN — HUMAN INSULIN: 100 INJECTION, SOLUTION SUBCUTANEOUS at 15:24

## 2018-07-02 RX ADMIN — HUMAN INSULIN 3 UNITS/HR: 100 INJECTION, SOLUTION SUBCUTANEOUS at 09:10

## 2018-07-02 RX ADMIN — MIDAZOLAM 1 MG: 1 INJECTION INTRAMUSCULAR; INTRAVENOUS at 08:00

## 2018-07-02 RX ADMIN — Medication 1 G/HR: at 09:35

## 2018-07-02 RX ADMIN — ALBUTEROL SULFATE 6 PUFF: 90 AEROSOL, METERED RESPIRATORY (INHALATION) at 17:37

## 2018-07-02 RX ADMIN — NOREPINEPHRINE BITARTRATE 6.4 MCG: 1 INJECTION INTRAVENOUS at 14:36

## 2018-07-02 RX ADMIN — NOREPINEPHRINE BITARTRATE 6.4 MCG: 1 INJECTION INTRAVENOUS at 14:30

## 2018-07-02 RX ADMIN — NITROGLYCERIN 50 MCG: 10 INJECTION INTRAVENOUS at 09:08

## 2018-07-02 RX ADMIN — CLEVIDIPINE 0.12 MG: 0.5 EMULSION INTRAVENOUS at 12:26

## 2018-07-02 RX ADMIN — FENTANYL CITRATE 250 MCG: 50 INJECTION, SOLUTION INTRAMUSCULAR; INTRAVENOUS at 09:09

## 2018-07-02 RX ADMIN — CLINDAMYCIN PHOSPHATE 900 MG: 900 INJECTION, SOLUTION INTRAVENOUS at 20:13

## 2018-07-02 RX ADMIN — CLEVIDIPINE 0.25 MG: 0.5 EMULSION INTRAVENOUS at 09:59

## 2018-07-02 RX ADMIN — FEBUXOSTAT 40 MG: 40 TABLET ORAL at 20:13

## 2018-07-02 RX ADMIN — NOREPINEPHRINE BITARTRATE 0.06 MCG/KG/MIN: 1 INJECTION INTRAVENOUS at 14:34

## 2018-07-02 RX ADMIN — NITROGLYCERIN 50 MCG: 10 INJECTION INTRAVENOUS at 09:24

## 2018-07-02 RX ADMIN — NOREPINEPHRINE BITARTRATE 6.4 MCG: 1 INJECTION INTRAVENOUS at 12:28

## 2018-07-02 RX ADMIN — SODIUM CHLORIDE: 9 INJECTION, SOLUTION INTRAVENOUS at 07:35

## 2018-07-02 NOTE — PROGRESS NOTES
Pt admitted to 4E ICU s/p Coronary Artery Bypass Graft x 3. Pt placed on a CMV 16 500 100% +10. Please see flowsheets for further information.

## 2018-07-02 NOTE — PLAN OF CARE
Problem: Cardiac: ACS (Acute Coronary Syndrome) (Adult)  Goal: Signs and Symptoms of Listed Potential Problems Will be Absent, Minimized or Managed (Cardiac: ACS)  Signs and symptoms of listed potential problems will be absent, minimized or managed by discharge/transition of care (reference Cardiac: ACS (Acute Coronary Syndrome) (Adult) CPG).   Outcome: No Change      D/I/A: CABG x3 today. Pt arrive on 4E at 1555 on levo, epi, precedex and insulin gtt. Sedation weaned down, pt follow commands. PRN dilaudid for pain. Pt is fast tracking for extubation, weaning down PEEP as pt arrive with PEEP 10.   UO 50-90cc/hr, CT output 40-100cc/hr.   Roommate Odalis present and updated.   Precedex off at 1800, waiting for pt to awake up before PS trial.  P: Extubate when appropriate. Continue to monitor/assess pt,  contact MD with questions/concerns.     Issac Juárez  RN, BSN
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
## 2018-07-02 NOTE — ANESTHESIA PROCEDURE NOTES
OLAF Probe Insertion Note  Probe Inserted by: MAX REGAN   Insertion method: OLAF probe easily inserted   Bite block used:   Yes      Probe type:  Adult 3D   Insertion complications: No complications.      Billing for OLAF report is being done by Anesthesiology

## 2018-07-02 NOTE — PROGRESS NOTES
D: Pt admitted 6/29 with severe triple vessel CAD, DM, COPD and previous PCI of LAD, hypertension, morbid obesity and sleep apnea, is here for new onset chest pain at home.   A/I: Pt is A&Ox4. Pt is able to make needs known and uses call light appropriately. Pt VSS on RA. O2 2 L overnight. Heart rhythm: SR, rates 70's-90's. BP running 150's-170's ongoing. PRN hydralazine ordered for SBP > 180. Pt is up with SBA/ 1 assist with cane. Pt is voiding adequate amounts overnight. Pt reports no pain or SOB. Nitro gtt running at 0.2 mcg/kg/min. Surgery prep showers done.  P: Pt scheduled for CABG today.  scheduled for 0730. Pt and family aware. Continue to monitor Pt status and report changes to Cards 1/CVTS team.

## 2018-07-02 NOTE — PROGRESS NOTES
SPIRITUAL HEALTH SERVICES  [facility] 3C   PRE-SURGERY VISIT    Had pre-surgery visit with pt.  Provided spiritual support, prayer.     Talisha Murphy   Intern  Pager [phone]
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
## 2018-07-02 NOTE — ANESTHESIA POSTPROCEDURE EVALUATION
Patient: Mariel Ribeiro    Procedure(s):  Median Sternotomy, On Cardiopulmonary Bypass Pump, Coronary Artery Bypass Graft x 3, using Left Internal Mammary and Endoscopic Vein [place] on Bilateral Saphenous Vein, Transesophageal Echocardiogram, Epi-aortic Ultrasound - Wound Class: I-Clean    Diagnosis:Coronary Artery Disease  Diagnosis Additional Information: No value filed.    Anesthesia Type:  General, ETT    Note:  Anesthesia Post Evaluation    Patient location during evaluation: ICU  Patient participation: Unable to evaluate secondary to administered sedation  Level of consciousness: obtunded/minimal responses  Pain management: unable to assess  Airway patency: patent  Cardiovascular status: acceptable  Respiratory status: acceptable, ETT and ventilator  Hydration status: acceptable  PONV: unable to assess     Anesthetic complications: None        Transported to ICU on full monitors and controlled ventilation.  Vital signs at time of transfer:  HR 80 NSR  /61  SpO2 100%    Supported hemodynamically with 0.03 mcg/kg/min epi and 0.04 mcg/kg/min norepi.  Sedated with dexmedetomidine 0.7 mcg/kg/h.  Insulin running at 7 u/h.    Full report given to ICU team.      Electronically Signed By: Alejandra Wilde MD  July 2, 2018  4:06 PM

## 2018-07-02 NOTE — BRIEF OP NOTE
[facility], [place]    Brief Operative Note    Pre-operative diagnosis: Coronary Artery Disease  Post-operative diagnosis * No post-op diagnosis entered *  Procedure: Procedure(s):  Median Sternotomy, On Cardiopulmonary Bypass Pump, Coronary Artery Bypass Graft x 3, using Left Internal Mammary and Endoscopic Vein [place] on Bilateral Saphenous Vein, Transesophageal Echocardiogram, Epi-aortic Ultrasound - Wound Class: I-Clean  Surgeon: Surgeon(s) and Role:     * Joao Mason MD - Primary     * Kb Jeffries PA-C - Assisting  Anesthesia: General   Estimated blood loss: * No values recorded between 7/2/2018  9:18 AM and 7/2/2018  3:51 PM *  Drains: Meds x2, left pleural  Specimens: * No specimens in log *  Findings:   None.  Complications: None.  Implants: None.    CABG x3, (LIMA to LAD, SVG to OM, SVG to PDA), bilateral EVH. Staples in bilateral leg wounds

## 2018-07-02 NOTE — ANESTHESIA PROCEDURE NOTES
PA Catheter Insertion Note  Anesthesiologist: MAX REGAN      Introducer: Introducer placed as part of procedure (SEE separate note)   Skin prep:  Chloraprep Cap, Full body drape, hand hygiene, Mask, Sterile gloves and Sterile gown    PA Catheter type:  CCO    Distance catheter advanced:  49 cm.    Appropriate RA, RV, PA  waveforms?: Yes    Dressing:  Biopatch and Tegaderm    Complications:  None apparent

## 2018-07-02 NOTE — ANESTHESIA PROCEDURE NOTES
Central Line Procedure Note  Staff:     Anesthesiologist:  MAX REGAN  Location: In OR after induction  Procedure Start/Stop Times:     patient identified, IV checked, site marked, risks and benefits discussed, informed consent, monitors and equipment checked, pre-op evaluation and at physician/surgeon's request      Correct Patient: Yes      Correct Position: Yes      Correct Site: Yes      Correct Procedure: Yes      Correct Laterality:  Yes    Site Marked:  Yes  Line Placement:     Procedure:  Central Line    Insertion laterality:  Right    Insertion site:  Internal Jugular    Position:  Trendelenburg      Maximal Sterile Barriers: All elements of maximal sterile barrier technique followed      (Maximal sterile barriers include:   Sterile gown, Sterile Gloves, Mask, Cap, Whole body draped, hand hygiene and acceptable skin prep).Skin Prep: Chloraprep         Injection Technique:  Ultrasound guided    Sterile Ultrasound Technique:  Sterile probe cover and Sterile gel    Vein evaluated via U/S for patency/adequacy of catheter insertion and is adequate.  Using realtime U/S imaging the vein was punctured, and needle was observed entering vein on U/S      Permanent Image entered into patient's record      Local skin infiltration:  None    Catheter size:  9 Fr, 2 lumen 11.5 cm (MAC)    Cath secured with: suture      Dressing:  Tegaderm and Biopatch    Complications:  None obvious    Blood aspirated all lumens: Yes      All Lumens Flushed: Yes      Verification method:  Ultrasound and Placement to be verified post-op

## 2018-07-02 NOTE — ANESTHESIA PROCEDURE NOTES
Perioperative OLAF Report  Anesthesia Information  OLAF probe placed and report generated by: : MAX REGAN BELINDA V  Images for this study have been archived.   Surgeon:  LUCIANA REDMOND      Preanesthesia Checklist:  Patient identified, IV assessed, risks and benefits discussed, monitors and equipment assessed, procedure being performed at surgeon's request and anesthesia consent obtained.  General Procedure Information  Modalities:  3D, 2D, CW Doppler, Color flow mapping and PW Doppler    CABG  Echocardiographic and Doppler Measurements  Right Ventricle:  Cavity size normal.   Global function normal.     Left Ventricle:  Cavity size normal.   Hypertrophy present.   Global Function mildly impaired.   Ejection Fraction 45%.      Ventricular Regional Function:  1- Basal Anteroseptal:  hypokinetic  2- Basal Anterior:  hypokinetic  3- Basal Anterolateral:  hypokinetic  4- Basal Inferolateral:  hypokinetic  5- Basal Inferior:  hypokinetic  6- Basal Inferoseptal:  hypokinetic  7- Mid Anteroseptal:  hypokinetic  8- Mid Anterior:  hypokinetic  9- Mid Anterolateral:  hypokinetic  10- Mid Inferolateral:  hypokinetic  11- Mid Inferior:  hypokinetic  12- Mid Inferoseptal:  hypokinetic  13- Apical Anterior:  hypokinetic  14- Apical Lateral:  hypokinetic  15- Apical Inferior:  hypokinetic  16- Apical Septal:  hypokinetic  17- [place]:  hypokinetic    Valves  Aortic Valve: Annulus normal.  Stenosis not present.  Regurgitation +1.  Leaflets normal.  Leaflet motions normal.    Mitral Valve: Annulus normal.  Stenosis not present.  Regurgitation +1.  Leaflets normal.  Leaflet motions normal.    Tricuspid Valve: Annulus normal.  Stenosis not present.  Regurgitation absent.  Leaflets normal.  Leaflet motions normal.    Pulmonic Valve: Annulus normal.  Stenosis not present.        Right Atrium:  Size normal.   Spontaneous echo contrast not present.   Thrombus not present.   Tumor not present.   Device not present.      Left Atrium: Size normal.  Spontaneous echo contrast not present.  Thrombus not present.  Tumor not present.  Device not present.    Left atrial appendage normal.     Atrial Septum: Intra-atrial septal morphology normal.     Ventricular Septum: Intra-ventricular septum morphology normal.       Other Findings:   Pericardium:  pericardial effusion.  . .  .  .  .  Post Intervention Findings  Procedure(s) performed:  CABG. Global function:  Unchanged.   Regional wall motion:  Unchanged   Surgeon(s) notified of all postintervention findings:  Yes  .  .  .   .  .  .  .  .  .  .    S/p CABG, echo performed on 0.03 mcg/kg/min.  Biventricular function unchanged.  Trace AI, trace MR as seen on preCPB exam.    Echocardiogram Comments

## 2018-07-02 NOTE — OP NOTE
OPERATIVE DATE: 7/2/2018    PRE-OPERATIVE DIAGNOSIS:  1) Coronary artery disease  2) Hypothyroidism  3) Myalgia and myositis  4) Insomnia  5) Migraine  6) COPD  7) Mononeuritis  8) Vitamin D deficiency  9) Hyperuricemia  10) Type 2 diabetes  11) Chronic kidney disease  12) Hypertension  13) Major depression  14) Hyperlipidemia  15) Chronic diastolic heart failure  16) Osteoarthritis  17) Morbid obesity  18) Arthritis  19) Constipation  20) Hypokalemia  21) Transient cerebral ischemia  22) Thyroid cancer  23) Cervicalgia  24) Cholelithiasis  25) Pancreatitis  26) Non-alcoholic steatohepatitis  27) Hepatomegaly    POST-OPERATIVE DIAGNOSIS:  1) Coronary artery disease  2) Hypothyroidism  3) Myalgia and myositis  4) Insomnia  5) Migraine  6) COPD  7) Mononeuritis  8) Vitamin D deficiency  9) Hyperuricemia  10) Type 2 diabetes  11) Chronic kidney disease  12) Hypertension  13) Major depression  14) Hyperlipidemia  15) Chronic diastolic heart failure  16) Osteoarthritis  17) Morbid obesity  18) Arthritis  19) Constipation  20) Hypokalemia  21) Transient cerebral ischemia  22) Thyroid cancer  23) Cervicalgia  24) Cholelithiasis  25) Pancreatitis  26) Non-alcoholic steatohepatitis  27) Hepatomegaly    PROCEDURE:  1) Coronary artery bypass grafting x 3.   - Left internal mammary artery to left anterior descending artery   - Reversed saphenous vein graft to right posterior descending artery   - Reversed saphenous vein graft to obtuse marginal artery 2  2) Endoscopic vein harvest bilateral lower extremity  3) Transesophageal echocardiogram  4) Epiaortic ultrasound    SURGEON: Joao Mason MD    ASSISTANT: Kb Jeffries PA-C    ANESTHESIA: GETA    ESTIMATED BLOOD LOSS: 1000cc    OPERATIVE FINDINGS:  1) Ejection fraction: 55%  2) Left internal mammary artery 3mm and pulsatile flow.  3) Bilateral greater saphenous vein 5-6mm and suitable for bypass.  4) Left anterior descending artery 3mm and free of disease at  anastomosis.  5) Right posterior descending artery 2mm and free of disease at anastomosis.  6) Obtuse marginal artery two 2mm and free of disease at anastomosis.  7) Obtuse marginal artery one 1mm and not acceptable for bypass  8) Right posterolateral artery 0.75mm and not acceptable for bypass    CARDIOPULMONARY BYPASS TIME: 106 minutes    AORTIC CROSS CLAMP TIME: 86 minutes    INDICATIONS:  Ms. ROSY PALMER is a 73 year old female admitted with angina at rest and coronary artery disease.  We were asked to evaluate for surgical revascularization.  Risks and benefits of the operation were explained to the patient and their family including, but not limited to, bleeding, infection, stroke and even death.  They understood these risks and agreed to proceed electively.    OPERATIVE REPORT:  The patient was transferred to the operating room and positioned supine on the OR table.  General anesthesia was initiated by the anesthesia team.  Endotracheal intubation and central venous access was performed by anesthesia.  The patients neck, chest, abdomen and bilateral lower extremities were clipped, prepped and draped in sterile fashion.  A pre-procedure time-out was performed confirming the correct patient, correct site and correct procedure.    Simultaneous median sternotomy and left lower extremity skin incisions were made.  Endoscopic vein harvest of bilateral greater saphenous vein was performed.  The vein was ligated at the proximal and distal ends, harvested from the leg, cannulated in reverse orientation, and flushed with heparinized saline.  Branches of the vein were triply clipped with metal clips.  The vein was then stored in heparinized saline.    Median sternotomy was made with reciprocating saw.  The bone was made hemostatic with cautery and bone wax.  A mammary retractor was placed.  The left pleural space was opened.  The left internal mammary artery was mobilized.  Branches of the artery were clipped with  metal clips and divided with cautery.      The patient was given 300 mg/kg IV heparin.  The mammary pedicle was clamped with a tonsil clamp.  The mammary artery was divided with metzenbaum scissors.  There was excellent pulsatile flow from the mammary artery.  This was controlled with a bulldog clamp.  The distal aspect was tied with 0-ethibond tie and double clipped.  The proximal end was spatulated in preparation for anastomosis and flushed with papavarine.      Pledgeted 2-0 ethibond pursestring was made in the ascending aorta.  A 20F EOPA arterial cannula was placed here and connected to the bypass circuit.  A 2-0 ethibond pursestring was made in the right atrial appendage.  A 32/40F dual stage venous cannula was placed here and connected to the cardiopulmonary bypass circuit.  A 4-0 prolene pursestring was made in the right atrium.  A stab incision was made here.  A retrograde cardioplegia catheter was placed and connected to the cardioplegia circuit.  Next the aorto-pulmonary window was developed.  A 4-0 prolene pursestring was made in the ascending aorta.  A DLP root vent / antegrade cardioplegia catheter was placed here and connected to the cardioplegia circuit.    Cardiopulmonary bypass was initiated with good flows.  Flow on the circuit was decreased.  A large aortic cross clamp was applied.  Flow on the circuit was resumed.  500cc of antegrade cold blood cardioplegia was delivered with good diastolic arrest.  An additional 1000cc of retrograde cold blood cardioplegia was used to test the retrograde.    We focused our attention on the distal bypass anastomoses next.    The following bypasses were constructed using reversed saphenous vein in end-to-side fashion with running 7-0 prolene:  1) Reversed saphenous vein graft to right posterior descending artery.  2) Reversed saphenous vein graft to obtuse marginal artery 2.    The anastomoses were tested by flushing with cold blood cardioplegia to ensure  hemostasis.  An additional dose of retrograde cold blood cardioplegia was delivered between completion of each anastomosis.    Next a rent was made in the left pericardium.  The left internal mammary artery was then anastomosed to the left anterior descending artery in end-to-side fashion using running 7-0 prolene.  The mammary pedicle clamp was removed.  The anastomosis was hemostatic.  The clamp was replaced.  The pedicle was tacked to the epicardium using 6-0 prolene.    We focused our attention on the proximal vein graft anastomoses next.  The vein grafts were sized to fit to the ascending aorta.  Two additional aortotomies were made with 11-blade knife.  The aortotomies were extended with 4mm punch.  The vein grafts were anastomosed to the ascending aorta in end-to-side fashion using running 6-0 prolene.    A retrograde hot shot of warm blood cardiplegia was delivered.  The clamp on the mammary pedicle was removed.  A bulldog clamp was placed on the proximal vein grafts.  After completion of the hot shot, flow on the bypass circuit was decreased and the aortic cross clamp was removed.  Flow on the bypass circuit was resumed.  The proximal vein grafts were de-aired using an insulin needle.  The bulldog clamp on the vein grafts was removed.  The retrograde cardioplegia catheter was removed.  The pursestring was tied.  The site was over-sewn with a 4-0 prolene.  The distal anastomoses were inspected and hemostatic.  Ventricular pacing wires were placed and delivered through the anterior abdominal wall and secured to the skin with 0-ethibond stitches.  The patient had normal rhythm and was ventricular paced at 90 bpm.      The left pleural space was suctioned dry.  The lungs were ventilated bilaterally.  OLAF showed no intra-cardiac air.  The DLP root vent / antegrade cardioplegia catheter was removed.  Intravenous calcium was administered.  Flow on the bypass circuit was weaned to off.  The patient was stable on  low dose epinephrine and nitroglycerin.  The venous cannula was removed.  Pursestring at this site was tied.  This was over-sewed with 0-ethibond.  The remaining pump blood volume was returned via the arterial cannula.  A test dose of protamine was administered.  The arterial cannula was removed.  The pursestrings at this site were tied.  This was oversewn with pledgeted 4-0 prolene.     The sternal retractor was removed.  Two 32F straight argyle mediastinal chest tubes were placed and one 28F left pleural chest tube was placed.  These were delivered through the anterior abdominal wall and secured to the skin with 0-ethibond stitches.      The wound was made hemostatic with cautery.  The subcutaneous tissue, sternal edges, thymic fat, pericardial edges and all anastomotic and cannulation sites were inspected and hemostatic.    The wound was irrigated with warm antibiotic saline.  The sternum was reapproximated with sternal wires.    The wound was made hemostatic then closed in layers using vicryl stitches.  The subcutaneous tissue was closed with 2-0 vicryl stitches.  The skin was closed with 3-0 vicryl.  Dermabond skin glue was applied.  A sterile dressing was applied.      The patient was then transferred from the operating bed to an ICU bed and transferred to the ICU in critical, but stable, condition.    All needle, sponge and instrument counts were correct at the end of the case.    Joao Mason  Cardiothoracic Surgery  Pager: [phone]

## 2018-07-02 NOTE — ANESTHESIA PROCEDURE NOTES
Arterial Line Procedure Note  Staff:     Anesthesiologist:  MAX REGAN  Location: In OR Before Induction  Procedure Start/Stop Times:     patient identified, IV checked, site marked, risks and benefits discussed, informed consent, monitors and equipment checked, pre-op evaluation and at physician/surgeon's request      Correct Patient: Yes      Correct Position: Yes      Correct Site: Yes      Correct Procedure: Yes      Correct Laterality:  Yes    Site Marked:  Yes  Line Placement:     Procedure:  Arterial Line    Insertion Site:  Radial    Insertion laterality:  Left    Patient Prep: patient draped, mask, sterile gloves, hat and hand hygiene      Local skin infiltration:  1% lidocaine    Ultrasound Guided?: Yes      Artery evaluated via ultrasound confirming patency.   Using realtime imaging, the artery was punctured and the needle was observed entering the artery.      A permanent image is entered into patient's chart.      Catheter size:  20 gauge, 12 cm    Cath secured with: suture      Dressing:  Tegaderm    Complications:  None obvious    Arterial waveform: Yes      IBP within 10% of NIBP: Yes

## 2018-07-02 NOTE — ANESTHESIA CARE TRANSFER NOTE
Patient: Mariel Ribeiro    Procedure(s):  Median Sternotomy, On Cardiopulmonary Bypass Pump, Coronary Artery Bypass Graft x 3, using Left Internal Mammary and Endoscopic Vein [place] on Bilateral Saphenous Vein, Transesophageal Echocardiogram, Epi-aortic Ultrasound - Wound Class: I-Clean    Diagnosis: Coronary Artery Disease  Diagnosis Additional Information: No value filed.    Anesthesia Type:   General, ETT     Note:  Airway :ETT  Patient transferred to:ICU  ICU Handoff: Call for PAUSE to initiate/utilize ICU HANDOFF, Identified Patient, Identified Responsible Provider, Reviewed the Pertinent Medical History, Discussed Surgical Course, Reviewed Intra-OP Anesthesia Management and Issues during Anesthesia, Set Expectations for Post Procedure Period and Allowed Opportunity for Questions and Acknowledgement of Understanding      Vitals: (Last set prior to Anesthesia Care Transfer)    CRNA VITALS  7/2/2018 1520 - 7/2/2018 1603      7/2/2018             EKG: Sinus rhythm                Electronically Signed By: CHANTAL Orellana CRNA  July 2, 2018  4:03 PM

## 2018-07-02 NOTE — PLAN OF CARE
Problem: Restraint for Non-Violent/Non-Self-Destructive Behavior  Goal: Prevent/Manage Potential Problems  Maintain safety of patient and others during period of restraint.  Promote psychological and physical wellbeing.  Prevent injury to skin and involved body parts.  Promote nutrition, hydration, and elimination.   Outcome: No Change    S: Pt on BUE extremities restraints for pulling lines after OR and recovering from Anesthesia  B: CABGx3 today.  A: BUE applied.  R: Check restraint q2hr if appropriate to continue.

## 2018-07-03 ENCOUNTER — APPOINTMENT (OUTPATIENT)
Dept: GENERAL RADIOLOGY | Facility: CLINIC | Age: 73
DRG: 234 | End: 2018-07-03
Attending: PHYSICIAN ASSISTANT
Payer: MEDICARE

## 2018-07-03 ENCOUNTER — PRE VISIT (OUTPATIENT)
Dept: CARDIOLOGY | Facility: CLINIC | Age: 73
End: 2018-07-03

## 2018-07-03 LAB
ALBUMIN UR-MCNC: NEGATIVE MG/DL
ANION GAP SERPL CALCULATED.3IONS-SCNC: 10 MMOL/L (ref 3–14)
ANION GAP SERPL CALCULATED.3IONS-SCNC: 7 MMOL/L (ref 3–14)
APPEARANCE UR: CLEAR
BASE DEFICIT BLDA-SCNC: 3.2 MMOL/L
BASE DEFICIT BLDA-SCNC: 3.5 MMOL/L
BASE DEFICIT BLDA-SCNC: 4.2 MMOL/L
BASE DEFICIT BLDA-SCNC: 4.7 MMOL/L
BASE DEFICIT BLDA-SCNC: 4.9 MMOL/L
BASE DEFICIT BLDA-SCNC: 5.2 MMOL/L
BASE DEFICIT BLDV-SCNC: 0.4 MMOL/L
BASE DEFICIT BLDV-SCNC: 1.7 MMOL/L
BASE DEFICIT BLDV-SCNC: 1.8 MMOL/L
BASE DEFICIT BLDV-SCNC: 2 MMOL/L
BASE EXCESS BLDV CALC-SCNC: 0.8 MMOL/L
BILIRUB UR QL STRIP: NEGATIVE
BUN SERPL-MCNC: 15 MG/DL (ref 7–30)
BUN SERPL-MCNC: 17 MG/DL (ref 7–30)
CA-I BLD-MCNC: 5 MG/DL (ref 4.4–5.2)
CALCIUM SERPL-MCNC: 8 MG/DL (ref 8.5–10.1)
CALCIUM SERPL-MCNC: 8.3 MG/DL (ref 8.5–10.1)
CHLORIDE SERPL-SCNC: 112 MMOL/L (ref 94–109)
CHLORIDE SERPL-SCNC: 114 MMOL/L (ref 94–109)
CO2 SERPL-SCNC: 21 MMOL/L (ref 20–32)
CO2 SERPL-SCNC: 25 MMOL/L (ref 20–32)
COLOR UR AUTO: ABNORMAL
CREAT SERPL-MCNC: 1.4 MG/DL (ref 0.52–1.04)
CREAT SERPL-MCNC: 1.56 MG/DL (ref 0.52–1.04)
ERYTHROCYTE [DISTWIDTH] IN BLOOD BY AUTOMATED COUNT: 15.8 % (ref 10–15)
ERYTHROCYTE [DISTWIDTH] IN BLOOD BY AUTOMATED COUNT: 16.4 % (ref 10–15)
GFR SERPL CREATININE-BSD FRML MDRD: 33 ML/MIN/1.7M2
GFR SERPL CREATININE-BSD FRML MDRD: 37 ML/MIN/1.7M2
GLUCOSE BLDC GLUCOMTR-MCNC: 151 MG/DL (ref 70–99)
GLUCOSE BLDC GLUCOMTR-MCNC: 158 MG/DL (ref 70–99)
GLUCOSE BLDC GLUCOMTR-MCNC: 158 MG/DL (ref 70–99)
GLUCOSE BLDC GLUCOMTR-MCNC: 159 MG/DL (ref 70–99)
GLUCOSE BLDC GLUCOMTR-MCNC: 169 MG/DL (ref 70–99)
GLUCOSE BLDC GLUCOMTR-MCNC: 173 MG/DL (ref 70–99)
GLUCOSE BLDC GLUCOMTR-MCNC: 178 MG/DL (ref 70–99)
GLUCOSE BLDC GLUCOMTR-MCNC: 184 MG/DL (ref 70–99)
GLUCOSE BLDC GLUCOMTR-MCNC: 184 MG/DL (ref 70–99)
GLUCOSE BLDC GLUCOMTR-MCNC: 190 MG/DL (ref 70–99)
GLUCOSE BLDC GLUCOMTR-MCNC: 194 MG/DL (ref 70–99)
GLUCOSE BLDC GLUCOMTR-MCNC: 195 MG/DL (ref 70–99)
GLUCOSE BLDC GLUCOMTR-MCNC: 197 MG/DL (ref 70–99)
GLUCOSE BLDC GLUCOMTR-MCNC: 197 MG/DL (ref 70–99)
GLUCOSE BLDC GLUCOMTR-MCNC: 198 MG/DL (ref 70–99)
GLUCOSE BLDC GLUCOMTR-MCNC: 202 MG/DL (ref 70–99)
GLUCOSE BLDC GLUCOMTR-MCNC: 208 MG/DL (ref 70–99)
GLUCOSE SERPL-MCNC: 177 MG/DL (ref 70–99)
GLUCOSE SERPL-MCNC: 193 MG/DL (ref 70–99)
GLUCOSE UR STRIP-MCNC: NEGATIVE MG/DL
HCO3 BLD-SCNC: 21 MMOL/L (ref 21–28)
HCO3 BLD-SCNC: 22 MMOL/L (ref 21–28)
HCO3 BLD-SCNC: 22 MMOL/L (ref 21–28)
HCO3 BLDV-SCNC: 24 MMOL/L (ref 21–28)
HCO3 BLDV-SCNC: 24 MMOL/L (ref 21–28)
HCO3 BLDV-SCNC: 25 MMOL/L (ref 21–28)
HCO3 BLDV-SCNC: 26 MMOL/L (ref 21–28)
HCO3 BLDV-SCNC: 28 MMOL/L (ref 21–28)
HCT VFR BLD AUTO: 23.3 % (ref 35–47)
HCT VFR BLD AUTO: 23.4 % (ref 35–47)
HGB BLD-MCNC: 7.6 G/DL (ref 11.7–15.7)
HGB BLD-MCNC: 7.8 G/DL (ref 11.7–15.7)
HGB UR QL STRIP: NEGATIVE
HYALINE CASTS #/AREA URNS LPF: 5 /LPF (ref 0–2)
INR PPP: 1.3 (ref 0.86–1.14)
KETONES UR STRIP-MCNC: NEGATIVE MG/DL
LACTATE BLD-SCNC: 1.6 MMOL/L (ref 0.7–2)
LACTATE BLD-SCNC: 3 MMOL/L (ref 0.7–2)
LEUKOCYTE ESTERASE UR QL STRIP: NEGATIVE
MAGNESIUM SERPL-MCNC: 1.9 MG/DL (ref 1.6–2.3)
MCH RBC QN AUTO: 27.2 PG (ref 26.5–33)
MCH RBC QN AUTO: 27.8 PG (ref 26.5–33)
MCHC RBC AUTO-ENTMCNC: 32.5 G/DL (ref 31.5–36.5)
MCHC RBC AUTO-ENTMCNC: 33.5 G/DL (ref 31.5–36.5)
MCV RBC AUTO: 83 FL (ref 78–100)
MCV RBC AUTO: 84 FL (ref 78–100)
MUCOUS THREADS #/AREA URNS LPF: PRESENT /LPF
NITRATE UR QL: NEGATIVE
O2/TOTAL GAS SETTING VFR VENT: 40 %
OXYHGB MFR BLD: 96 % (ref 92–100)
OXYHGB MFR BLD: 96 % (ref 92–100)
OXYHGB MFR BLD: 97 % (ref 92–100)
OXYHGB MFR BLDV: 64 %
OXYHGB MFR BLDV: 65 %
OXYHGB MFR BLDV: 67 %
OXYHGB MFR BLDV: 69 %
OXYHGB MFR BLDV: 70 %
PCO2 BLD: 35 MM HG (ref 35–45)
PCO2 BLD: 39 MM HG (ref 35–45)
PCO2 BLD: 40 MM HG (ref 35–45)
PCO2 BLD: 41 MM HG (ref 35–45)
PCO2 BLD: 43 MM HG (ref 35–45)
PCO2 BLD: 45 MM HG (ref 35–45)
PCO2 BLDV: 45 MM HG (ref 40–50)
PCO2 BLDV: 48 MM HG (ref 40–50)
PCO2 BLDV: 52 MM HG (ref 40–50)
PCO2 BLDV: 55 MM HG (ref 40–50)
PCO2 BLDV: 56 MM HG (ref 40–50)
PH BLD: 7.28 PH (ref 7.35–7.45)
PH BLD: 7.31 PH (ref 7.35–7.45)
PH BLD: 7.33 PH (ref 7.35–7.45)
PH BLD: 7.33 PH (ref 7.35–7.45)
PH BLD: 7.36 PH (ref 7.35–7.45)
PH BLD: 7.38 PH (ref 7.35–7.45)
PH BLDV: 7.27 PH (ref 7.32–7.43)
PH BLDV: 7.3 PH (ref 7.32–7.43)
PH BLDV: 7.31 PH (ref 7.32–7.43)
PH BLDV: 7.31 PH (ref 7.32–7.43)
PH BLDV: 7.33 PH (ref 7.32–7.43)
PH UR STRIP: 5 PH (ref 5–7)
PHOSPHATE SERPL-MCNC: 2.3 MG/DL (ref 2.5–4.5)
PLATELET # BLD AUTO: 114 10E9/L (ref 150–450)
PLATELET # BLD AUTO: 124 10E9/L (ref 150–450)
PO2 BLD: 113 MM HG (ref 80–105)
PO2 BLD: 114 MM HG (ref 80–105)
PO2 BLD: 134 MM HG (ref 80–105)
PO2 BLD: 141 MM HG (ref 80–105)
PO2 BLD: 142 MM HG (ref 80–105)
PO2 BLD: 144 MM HG (ref 80–105)
PO2 BLDV: 33 MM HG (ref 25–47)
PO2 BLDV: 34 MM HG (ref 25–47)
PO2 BLDV: 35 MM HG (ref 25–47)
PO2 BLDV: 36 MM HG (ref 25–47)
PO2 BLDV: 39 MM HG (ref 25–47)
POTASSIUM SERPL-SCNC: 3.7 MMOL/L (ref 3.4–5.3)
POTASSIUM SERPL-SCNC: 4.5 MMOL/L (ref 3.4–5.3)
RBC # BLD AUTO: 2.79 10E12/L (ref 3.8–5.2)
RBC # BLD AUTO: 2.81 10E12/L (ref 3.8–5.2)
RBC #/AREA URNS AUTO: 0 /HPF (ref 0–2)
SODIUM SERPL-SCNC: 144 MMOL/L (ref 133–144)
SODIUM SERPL-SCNC: 145 MMOL/L (ref 133–144)
SOURCE: ABNORMAL
SP GR UR STRIP: 1.01 (ref 1–1.03)
SQUAMOUS #/AREA URNS AUTO: <1 /HPF (ref 0–1)
TRANS CELLS #/AREA URNS HPF: 1 /HPF (ref 0–1)
UROBILINOGEN UR STRIP-MCNC: NORMAL MG/DL (ref 0–2)
WBC # BLD AUTO: 16.7 10E9/L (ref 4–11)
WBC # BLD AUTO: 17.2 10E9/L (ref 4–11)
WBC #/AREA URNS AUTO: 0 /HPF (ref 0–5)

## 2018-07-03 PROCEDURE — 00000146 ZZHCL STATISTIC GLUCOSE BY METER IP

## 2018-07-03 PROCEDURE — A9270 NON-COVERED ITEM OR SERVICE: HCPCS | Mod: GY | Performed by: THORACIC SURGERY (CARDIOTHORACIC VASCULAR SURGERY)

## 2018-07-03 PROCEDURE — 20000004 ZZH R&B ICU UMMC

## 2018-07-03 PROCEDURE — 87040 BLOOD CULTURE FOR BACTERIA: CPT | Performed by: STUDENT IN AN ORGANIZED HEALTH CARE EDUCATION/TRAINING PROGRAM

## 2018-07-03 PROCEDURE — 83605 ASSAY OF LACTIC ACID: CPT | Performed by: STUDENT IN AN ORGANIZED HEALTH CARE EDUCATION/TRAINING PROGRAM

## 2018-07-03 PROCEDURE — A9270 NON-COVERED ITEM OR SERVICE: HCPCS | Mod: GY | Performed by: STUDENT IN AN ORGANIZED HEALTH CARE EDUCATION/TRAINING PROGRAM

## 2018-07-03 PROCEDURE — 94640 AIRWAY INHALATION TREATMENT: CPT | Mod: 76

## 2018-07-03 PROCEDURE — 40000048 ZZH STATISTIC DAILY SWAN MONITORING

## 2018-07-03 PROCEDURE — 25000132 ZZH RX MED GY IP 250 OP 250 PS 637: Mod: GY | Performed by: STUDENT IN AN ORGANIZED HEALTH CARE EDUCATION/TRAINING PROGRAM

## 2018-07-03 PROCEDURE — 93005 ELECTROCARDIOGRAM TRACING: CPT

## 2018-07-03 PROCEDURE — 25000132 ZZH RX MED GY IP 250 OP 250 PS 637: Mod: GY | Performed by: THORACIC SURGERY (CARDIOTHORACIC VASCULAR SURGERY)

## 2018-07-03 PROCEDURE — 36415 COLL VENOUS BLD VENIPUNCTURE: CPT | Performed by: STUDENT IN AN ORGANIZED HEALTH CARE EDUCATION/TRAINING PROGRAM

## 2018-07-03 PROCEDURE — 25000128 H RX IP 250 OP 636: Performed by: STUDENT IN AN ORGANIZED HEALTH CARE EDUCATION/TRAINING PROGRAM

## 2018-07-03 PROCEDURE — 40000281 ZZH STATISTIC TRANSPORT TIME EA 15 MIN

## 2018-07-03 PROCEDURE — 94003 VENT MGMT INPAT SUBQ DAY: CPT

## 2018-07-03 PROCEDURE — 94640 AIRWAY INHALATION TREATMENT: CPT

## 2018-07-03 PROCEDURE — 82330 ASSAY OF CALCIUM: CPT | Performed by: PHYSICIAN ASSISTANT

## 2018-07-03 PROCEDURE — 84100 ASSAY OF PHOSPHORUS: CPT | Performed by: STUDENT IN AN ORGANIZED HEALTH CARE EDUCATION/TRAINING PROGRAM

## 2018-07-03 PROCEDURE — 83735 ASSAY OF MAGNESIUM: CPT | Performed by: STUDENT IN AN ORGANIZED HEALTH CARE EDUCATION/TRAINING PROGRAM

## 2018-07-03 PROCEDURE — 25000125 ZZHC RX 250: Performed by: STUDENT IN AN ORGANIZED HEALTH CARE EDUCATION/TRAINING PROGRAM

## 2018-07-03 PROCEDURE — 99232 SBSQ HOSP IP/OBS MODERATE 35: CPT | Mod: GC | Performed by: ANESTHESIOLOGY

## 2018-07-03 PROCEDURE — 40000014 ZZH STATISTIC ARTERIAL MONITORING DAILY

## 2018-07-03 PROCEDURE — 25000128 H RX IP 250 OP 636: Performed by: SURGERY

## 2018-07-03 PROCEDURE — 25000125 ZZHC RX 250: Performed by: PHYSICIAN ASSISTANT

## 2018-07-03 PROCEDURE — 71045 X-RAY EXAM CHEST 1 VIEW: CPT

## 2018-07-03 PROCEDURE — 93010 ELECTROCARDIOGRAM REPORT: CPT | Performed by: INTERNAL MEDICINE

## 2018-07-03 PROCEDURE — 40000196 ZZH STATISTIC RAPCV CVP MONITORING

## 2018-07-03 PROCEDURE — 85027 COMPLETE CBC AUTOMATED: CPT | Performed by: STUDENT IN AN ORGANIZED HEALTH CARE EDUCATION/TRAINING PROGRAM

## 2018-07-03 PROCEDURE — 40000275 ZZH STATISTIC RCP TIME EA 10 MIN

## 2018-07-03 PROCEDURE — 25000128 H RX IP 250 OP 636

## 2018-07-03 PROCEDURE — 83605 ASSAY OF LACTIC ACID: CPT | Performed by: PHYSICIAN ASSISTANT

## 2018-07-03 PROCEDURE — 81001 URINALYSIS AUTO W/SCOPE: CPT | Performed by: STUDENT IN AN ORGANIZED HEALTH CARE EDUCATION/TRAINING PROGRAM

## 2018-07-03 PROCEDURE — 25000132 ZZH RX MED GY IP 250 OP 250 PS 637: Mod: GY | Performed by: PHYSICIAN ASSISTANT

## 2018-07-03 PROCEDURE — A9270 NON-COVERED ITEM OR SERVICE: HCPCS | Mod: GY | Performed by: ANESTHESIOLOGY

## 2018-07-03 PROCEDURE — A9270 NON-COVERED ITEM OR SERVICE: HCPCS | Mod: GY | Performed by: PHYSICIAN ASSISTANT

## 2018-07-03 PROCEDURE — 25000132 ZZH RX MED GY IP 250 OP 250 PS 637: Mod: GY | Performed by: ANESTHESIOLOGY

## 2018-07-03 PROCEDURE — 85610 PROTHROMBIN TIME: CPT | Performed by: STUDENT IN AN ORGANIZED HEALTH CARE EDUCATION/TRAINING PROGRAM

## 2018-07-03 PROCEDURE — 82805 BLOOD GASES W/O2 SATURATION: CPT | Performed by: PHYSICIAN ASSISTANT

## 2018-07-03 PROCEDURE — 25000128 H RX IP 250 OP 636: Performed by: PHYSICIAN ASSISTANT

## 2018-07-03 PROCEDURE — 80048 BASIC METABOLIC PNL TOTAL CA: CPT | Performed by: STUDENT IN AN ORGANIZED HEALTH CARE EDUCATION/TRAINING PROGRAM

## 2018-07-03 PROCEDURE — P9041 ALBUMIN (HUMAN),5%, 50ML: HCPCS

## 2018-07-03 RX ORDER — ALBUMIN, HUMAN INJ 5% 5 %
SOLUTION INTRAVENOUS
Status: COMPLETED
Start: 2018-07-03 | End: 2018-07-03

## 2018-07-03 RX ORDER — TRAMADOL HYDROCHLORIDE 50 MG/1
50 TABLET ORAL EVERY 6 HOURS PRN
Status: DISCONTINUED | OUTPATIENT
Start: 2018-07-03 | End: 2018-07-13 | Stop reason: HOSPADM

## 2018-07-03 RX ORDER — ACETAMINOPHEN 650 MG/1
650 SUPPOSITORY RECTAL EVERY 6 HOURS PRN
Status: DISCONTINUED | OUTPATIENT
Start: 2018-07-03 | End: 2018-07-06

## 2018-07-03 RX ORDER — ALBUTEROL SULFATE 0.83 MG/ML
2.5 SOLUTION RESPIRATORY (INHALATION) EVERY 4 HOURS
Status: DISCONTINUED | OUTPATIENT
Start: 2018-07-03 | End: 2018-07-06

## 2018-07-03 RX ORDER — ALBUMIN, HUMAN INJ 5% 5 %
12.5 SOLUTION INTRAVENOUS ONCE
Status: COMPLETED | OUTPATIENT
Start: 2018-07-03 | End: 2018-07-03

## 2018-07-03 RX ORDER — SALIVA STIMULANT COMB. NO.3
1 SPRAY, NON-AEROSOL (ML) MUCOUS MEMBRANE 4 TIMES DAILY PRN
Status: DISCONTINUED | OUTPATIENT
Start: 2018-07-03 | End: 2018-07-13 | Stop reason: HOSPADM

## 2018-07-03 RX ORDER — FUROSEMIDE 10 MG/ML
40 INJECTION INTRAMUSCULAR; INTRAVENOUS EVERY 12 HOURS
Status: DISCONTINUED | OUTPATIENT
Start: 2018-07-03 | End: 2018-07-05

## 2018-07-03 RX ORDER — PREGABALIN 20 MG/ML
100 SOLUTION ORAL 2 TIMES DAILY
Status: DISCONTINUED | OUTPATIENT
Start: 2018-07-03 | End: 2018-07-03

## 2018-07-03 RX ORDER — ALBUTEROL SULFATE 90 UG/1
6 AEROSOL, METERED RESPIRATORY (INHALATION) EVERY 4 HOURS PRN
Status: DISCONTINUED | OUTPATIENT
Start: 2018-07-03 | End: 2018-07-09

## 2018-07-03 RX ADMIN — LEVOTHYROXINE SODIUM 150 MCG: 150 TABLET ORAL at 09:10

## 2018-07-03 RX ADMIN — Medication 150 MG: at 00:51

## 2018-07-03 RX ADMIN — NOREPINEPHRINE BITARTRATE 0.03 MCG/KG/MIN: 1 INJECTION INTRAVENOUS at 16:56

## 2018-07-03 RX ADMIN — ALBUTEROL SULFATE 6 PUFF: 90 AEROSOL, METERED RESPIRATORY (INHALATION) at 01:17

## 2018-07-03 RX ADMIN — CLINDAMYCIN PHOSPHATE 900 MG: 900 INJECTION, SOLUTION INTRAVENOUS at 04:38

## 2018-07-03 RX ADMIN — PREGABALIN 100 MG: 20 SOLUTION ORAL at 13:04

## 2018-07-03 RX ADMIN — MUPIROCIN 0.5 G: 20 OINTMENT TOPICAL at 09:12

## 2018-07-03 RX ADMIN — SODIUM BICARBONATE 50 MEQ/HR: 84 INJECTION, SOLUTION INTRAVENOUS at 14:23

## 2018-07-03 RX ADMIN — ALBUTEROL SULFATE 2.5 MG: 2.5 SOLUTION RESPIRATORY (INHALATION) at 17:04

## 2018-07-03 RX ADMIN — HUMAN INSULIN 6 UNITS/HR: 100 INJECTION, SOLUTION SUBCUTANEOUS at 23:02

## 2018-07-03 RX ADMIN — SENNOSIDES AND DOCUSATE SODIUM 2 TABLET: 8.6; 5 TABLET ORAL at 09:18

## 2018-07-03 RX ADMIN — ASPIRIN 325 MG ORAL TABLET 325 MG: 325 PILL ORAL at 09:11

## 2018-07-03 RX ADMIN — EPINEPHRINE 0.06 MCG/KG/MIN: 1 INJECTION PARENTERAL at 06:11

## 2018-07-03 RX ADMIN — ALBUMIN HUMAN 12.5 G: 50 SOLUTION INTRAVENOUS at 01:42

## 2018-07-03 RX ADMIN — POTASSIUM CHLORIDE 20 MEQ: 1.5 POWDER, FOR SOLUTION ORAL at 04:49

## 2018-07-03 RX ADMIN — SODIUM BICARBONATE 50 MEQ: 84 INJECTION INTRAVENOUS at 00:54

## 2018-07-03 RX ADMIN — ALBUTEROL SULFATE 2.5 MG: 2.5 SOLUTION RESPIRATORY (INHALATION) at 20:48

## 2018-07-03 RX ADMIN — FUROSEMIDE 40 MG: 40 TABLET ORAL at 09:10

## 2018-07-03 RX ADMIN — ALBUTEROL SULFATE 6 PUFF: 90 AEROSOL, METERED RESPIRATORY (INHALATION) at 12:12

## 2018-07-03 RX ADMIN — HEPARIN SODIUM 5000 UNITS: 5000 INJECTION, SOLUTION INTRAVENOUS; SUBCUTANEOUS at 16:30

## 2018-07-03 RX ADMIN — PANTOPRAZOLE SODIUM 40 MG: 40 INJECTION, POWDER, FOR SOLUTION INTRAVENOUS at 09:11

## 2018-07-03 RX ADMIN — HYDROMORPHONE HYDROCHLORIDE 0.5 MG: 1 INJECTION, SOLUTION INTRAMUSCULAR; INTRAVENOUS; SUBCUTANEOUS at 04:49

## 2018-07-03 RX ADMIN — HUMAN INSULIN 8 UNITS/HR: 100 INJECTION, SOLUTION SUBCUTANEOUS at 04:37

## 2018-07-03 RX ADMIN — MAGNESIUM SULFATE HEPTAHYDRATE 2 G: 40 INJECTION, SOLUTION INTRAVENOUS at 04:49

## 2018-07-03 RX ADMIN — Medication 3 MG: at 00:50

## 2018-07-03 RX ADMIN — ALBUMIN HUMAN 12.5 G: 0.05 INJECTION, SOLUTION INTRAVENOUS at 03:39

## 2018-07-03 RX ADMIN — HYDROMORPHONE HYDROCHLORIDE 0.5 MG: 1 INJECTION, SOLUTION INTRAMUSCULAR; INTRAVENOUS; SUBCUTANEOUS at 02:34

## 2018-07-03 RX ADMIN — HUMAN INSULIN 5 UNITS/HR: 100 INJECTION, SOLUTION SUBCUTANEOUS at 16:27

## 2018-07-03 RX ADMIN — FUROSEMIDE 40 MG: 10 INJECTION, SOLUTION INTRAVENOUS at 16:56

## 2018-07-03 RX ADMIN — CLINDAMYCIN PHOSPHATE 900 MG: 900 INJECTION, SOLUTION INTRAVENOUS at 13:40

## 2018-07-03 RX ADMIN — ALBUTEROL SULFATE 6 PUFF: 90 AEROSOL, METERED RESPIRATORY (INHALATION) at 04:23

## 2018-07-03 RX ADMIN — HUMAN INSULIN 10 UNITS/HR: 100 INJECTION, SOLUTION SUBCUTANEOUS at 08:02

## 2018-07-03 RX ADMIN — ESCITALOPRAM 20 MG: 20 TABLET, FILM COATED ORAL at 09:11

## 2018-07-03 RX ADMIN — ALBUMIN HUMAN 12.5 G: 0.05 INJECTION, SOLUTION INTRAVENOUS at 01:42

## 2018-07-03 RX ADMIN — EPINEPHRINE 0.06 MCG/KG/MIN: 1 INJECTION PARENTERAL at 16:56

## 2018-07-03 RX ADMIN — HUMAN INSULIN 10 UNITS/HR: 100 INJECTION, SOLUTION SUBCUTANEOUS at 11:04

## 2018-07-03 RX ADMIN — LIDOCAINE 1 PATCH: 560 PATCH PERCUTANEOUS; TOPICAL; TRANSDERMAL at 11:05

## 2018-07-03 RX ADMIN — ALBUMIN HUMAN 12.5 G: 50 SOLUTION INTRAVENOUS at 03:39

## 2018-07-03 RX ADMIN — GUAIFENESIN 200 MG: 100 SOLUTION ORAL at 11:05

## 2018-07-03 RX ADMIN — HYDROMORPHONE HYDROCHLORIDE 0.5 MG: 1 INJECTION, SOLUTION INTRAMUSCULAR; INTRAVENOUS; SUBCUTANEOUS at 17:28

## 2018-07-03 RX ADMIN — ALBUTEROL SULFATE 6 PUFF: 90 AEROSOL, METERED RESPIRATORY (INHALATION) at 08:08

## 2018-07-03 RX ADMIN — ACETAMINOPHEN 975 MG: 325 TABLET, FILM COATED ORAL at 09:11

## 2018-07-03 RX ADMIN — HEPARIN SODIUM 5000 UNITS: 5000 INJECTION, SOLUTION INTRAVENOUS; SUBCUTANEOUS at 09:11

## 2018-07-03 RX ADMIN — POTASSIUM PHOSPHATE, MONOBASIC AND POTASSIUM PHOSPHATE, DIBASIC 15 MMOL: 224; 236 INJECTION, SOLUTION INTRAVENOUS at 06:15

## 2018-07-03 ASSESSMENT — PAIN DESCRIPTION - DESCRIPTORS: DESCRIPTORS: ACHING

## 2018-07-03 NOTE — PROGRESS NOTES
[facility], [place]  Procedure Note          Extubation:       Mariel Ribeiro  MRN# [id number]   July 3, 2018, 1:15 PM         Patient extubated at: July 3, 2018, 1:15 PM   Supplemental Oxygen: Via BiPAP 10/5, R 12 at 40% fio2   Cough: The cough is good   Secretion Mode: Able to clear   Secretion Amount: Scant amount, thin and clear in color   Respiratory Exam:: Breath sounds: equal and clear     Location: all lobes   Skin Exam:: Patient color: natural   Patient Status: Currently appears comfortable   Arterial Blood Gasses: pH Arterial (pH)   Date Value   07/03/2018 7.31 (L)     pO2 Arterial (mm Hg)   Date Value   07/03/2018 144 (H)     pCO2 Arterial (mm Hg)   Date Value   07/03/2018 41     Bicarbonate Arterial (mmol/L)   Date Value   07/03/2018 21            Pt extubated per order. Pt had adequate cuff leak. Pt extubated with no significant events.     Recorded by [employer]

## 2018-07-03 NOTE — PROGRESS NOTES
CARDIAC ICU PROGRESS NOTE  July 3, 2018      CO-MORBIDITIES:   Acute chest pain   Hypokalemia  DM2, HTN, CKD3, COPD, fibromyalgia, anxiety, depression, lymphedema, CAD    ASSESSMENT: Mariel Ribeiro is a 73 year old female s/p 3v CABG (Left internal mammary artery to left anterior descending artery, Reversed saphenous vein graft to right posterior descending artery, Reversed saphenous vein graft to obtuse marginal artery 2)    CHANGES FOR TODAY:  - extubated to bipap  - repeat lactic acid improving  - wean from pressors as able  - Avoid narcotics if able given sedation    PLAN:  Neuro/ pain/ sedation:  - Monitor neurological status. Notify the MD for any acute changes in exam.  - Escitalopram, pregabalin, hold Trazadone  - PRN tramadol, PRN dilaudid for pain  - Hold sedative medications given drowsiness post extubation.    Pulmonary care:   - extubated to bipap 10/5, 40%  - Supplemental oxygen to keep saturation above 92 %.  - Incentive spirometer every 15- 30 minutes when awake and off bipap  - albuterol nebs    Cardiovascular:    # 3v CAB LIMA to LAD, SVG to OM, SVG to PDA  - ASA 325mg,   - Monitor hemodynamic status.   - Wean norepinephrine and epinephrine as able    GI care:   - NPO while on bipap  - Senokot    Fluids/ Electrolytes/ Nutrition:   - D5 1/2NS + 20K  for IV fluid hydration  - Lyte replacement per protocol  - No indication for parenteral nutrition.    Renal/ Fluid Balance:    - Urine output is adequate so far. Continue liriano  - Furosemide 40mg BID  - Will continue to monitor intake and output.    Endocrine:    - insulin gtt  - PTA levothyroxine    ID/ Antibiotics:  - Perioperative clindamycin    Heme:     #postop anemia  - Hemoglobin stable    MSK:  -PT/OT    Prophylaxis:    - Heparin 5000 TID  - SCDs    Lines/ tubes/ drains:  - CVC  - [place]  - PIV x 2  - Art line  - CT x 3  - Liriano    Disposition:  - CV ICU.       ====================================    SUBJECTIVE:   - Improving blood gases  overnight. Did have apnea episode during pressure support trial, improving mental status during the morning prompting extubation to bipap given history of BELEN on home cpap.    OBJECTIVE:   1. VITAL SIGNS:   Temp:  [99.9  F (37.7  C)-101.5  F (38.6  C)] 100.6  F (38.1  C)  Heart Rate:  [] 104  Resp:  [10-18] 13  MAP:  [58 mmHg-85 mmHg] 75 mmHg  Arterial Line BP: ()/(37-63) 119/52  FiO2 (%):  [40 %] 40 %  SpO2:  [97 %-100 %] 99 %  Ventilation Mode: CPAP/PS  (Continuous positive airway pressure with Pressure Support)  FiO2 (%): 40 %  Rate Set (breaths/minute): 16 breaths/min  Tidal Volume Set (mL): 550 mL  PEEP (cm H2O): 5 cmH2O  Pressure Support (cm H2O): 7 cmH2O  Oxygen Concentration (%): 40 %  Resp: 13    2. INTAKE/ OUTPUT:   I/O last 3 completed shifts:  In: 5381.51 [I.V.:3851.51; NG/GT:280]  Out: 2060 [Urine:1480; Chest Tube:580]    3. PHYSICAL EXAMINATION:   General: obese female  Neuro: A&O, NAD, resting quietly  Resp: Breathing non-labored on bipap. Surgical dressings in place. CTs with serosang output  CV: RRR  Abdomen: Soft, Non-distended, Non-tender  Incisions: Bandages in place  Extremities: warm and well perfused    4. INVESTIGATIONS:   Arterial Blood Gases     Recent Labs  Lab 07/03/18  1458 07/03/18  1145 07/03/18  1004 07/03/18  0754   PH 7.33* 7.31* 7.33* 7.38   PCO2 43 41 40 35   PO2 134* 144* 142* 141*   HCO3 22 21 21 21     Complete Blood Count     Recent Labs  Lab 07/03/18  1221 07/03/18  0358 07/02/18  1606 07/02/18  1510 07/02/18  1436  06/30/18  0655   WBC 17.2* 16.7* 16.3*  --   --   --  7.2   HGB 7.6* 7.8* 9.4* 9.9*  --   < > 12.4   * 114* 100*  --  63*  --  132*   < > = values in this interval not displayed.  Basic Metabolic Panel    Recent Labs  Lab 07/03/18  1221 07/03/18  0358 07/02/18  1606 07/02/18  1510  06/30/18  0655    145* 144 141  < > 140   POTASSIUM 4.5 3.7 3.3* 2.9*  < > 3.7   CHLORIDE 112* 114* 112*  --   --  106   CO2 25 21 23  --   --  27   BUN 17 15  14  --   --  19   CR 1.56* 1.40* 1.05*  --   --  1.18*   * 177* 181* 184*  < > 162*   < > = values in this interval not displayed.  Liver Function Tests    Recent Labs  Lab 07/03/18  0637 07/02/18  1606 07/02/18  1436 07/01/18  0621 06/29/18  1713   AST  --   --   --  20 22   ALT  --   --   --  26 28   ALKPHOS  --   --   --  100 108   BILITOTAL  --   --   --  1.0 0.9   ALBUMIN  --   --   --  3.1* 3.4   INR 1.30* 1.40* 1.52*  --  1.01     Pancreatic Enzymes  No lab results found in last 7 days.  Coagulation Profile    Recent Labs  Lab 07/03/18  0637 07/02/18  1606 07/02/18  1436 06/29/18  1713   INR 1.30* 1.40* 1.52* 1.01   PTT  --  37 33  --      Lactate  Invalid input(s): LACTATE    5. RADIOLOGY:   No results found for this or any previous visit (from the past 24 hour(s)).    =========================================      Patient seen, findings and plan discussed with surgical ICU staff, Dr. Dumont.    Jose Armando Sahni MD   Gen Surg PGY-3  P: [phone]

## 2018-07-03 NOTE — PLAN OF CARE
Problem: Cardiac Surgery (Adult)  Goal: Signs and Symptoms of Listed Potential Problems Will be Absent, Minimized or Managed (Cardiac Surgery)  Signs and symptoms of listed potential problems will be absent, minimized or managed by discharge/transition of care (reference Cardiac Surgery (Adult) CPG).  Outcome: Improving   07/03/18 1759   Cardiac Surgery   Problems Assessed (Cardiac Surgery) all   Problems Present (Cardiac Surgery) hemodynamic instability;respiratory compromise;situational response     D/I/A:  CABG POD#1. AOx4 in room, lethargic. SR/ST (90's - 110's). Sedation off at start of shift. PS from 8:30 to ~13:20, then extubated straight to Bipap at 40%. MAP goal >70 d/t carotid stenosis. MAP maintained 70-80 on epi at 0.06, norepi at 0.06 (then weaned to off). Pt remains NPO d/t bipap, thus managing pain w/ IV dilaudid despite sedating effect. AM lytes replaced in AM, recheck 7/4. Insulin gtt 5-10 U/hr. 10-30 cc/hr serosanguinous drainage from 2-MS, 1-pleural CT. Diuresing w/ 40 mg (PO in AM, IV in PM) Lasix. Voiding via liriano, ~115 cc/hr out. UA sent. Attempted venipuncture blood cultures 2x by 2 different people. Awaiting vascular access to draw w/ US.    Plan:  Monitor respiratory status, manage MAP >70, and notify provider of changes.

## 2018-07-03 NOTE — PROGRESS NOTES
07/03/18 1000   Values Beliefs and Spiritual Care   C: Community: In support of your spiritual health, is there someone we may contact for you? (identify all that apply) ( 4E)   Visit Information   Visit Made By Priest Cage   Type of Visit Initial   Interventions   Basic Spiritual Interventions    introduction/orientation to Spiritual Health Services;Assessment of spiritual needs/resources;Reflective conversation;Prayer   Ritual/Sacramental Encounter  Prior Mosque anointing   Advanced Assessments/Interventions   Presenting Concerns/Issues Spiritual/Jainism/emotional support     I visited Mariel and her roommate Odalis during routine visit to unit. Mariel is alert, but intubated. Odalis reports Mariel is anxious to be free of ventilator and confirms that Alon Nuñez had anointed her pre-surgery. I offer a prayer of blessing for a return to health and strength. Mariel expresses thanks.

## 2018-07-03 NOTE — PLAN OF CARE
Problem: Patient Care Overview  Goal: Plan of Care/Patient Progress Review  RN:  1.Pt will remain hemodynamically stable.    2.Tropinin/EKG WNL.   PT 4E: Cancel.  Working towards extubation.  Will reschedule.

## 2018-07-03 NOTE — PROGRESS NOTES
Problem: Abnormal ABG/VBG  Goal: maintain respiratory stability  Outcome: Improving                  Remains intubated, unable to fast track d/t low ph (ABG/VBG), Keep intubated per MD. Follows command, Tmax 101.9. Given 500cc Albumin for low CVP. PA 24/18. SVo2 64, ,-See flowsheet for hemodynamics. Pressure supported @0600 but unable to tolerate. CT 10-40 Q1hr, OG clamped, 40-50cc UOP. Continue to monitor and intervene as indicated.

## 2018-07-03 NOTE — PLAN OF CARE
Problem: Patient Care Overview  Goal: Plan of Care/Patient Progress Review  RN:  1.Pt will remain hemodynamically stable.    2.Tropinin/EKG WNL.   OT/Edema: CX: Pt was working towards extubation, will check back for edema mgmt 7/5.

## 2018-07-03 NOTE — PROGRESS NOTES
CLINICAL NUTRITION SERVICES - BRIEF NOTE    Received provider consult for nutrition education with comments post op cardiovascular surgery (automatic consult on post-op order set). S/p CABG x3 on 7/2. Nutrition education to be completed as able/appropriate.    RD will follow per LOS protocol or if re-consulted.     Luh Walsh RD, LD  CVICU Pager: [phone]

## 2018-07-03 NOTE — PLAN OF CARE
Problem: Patient Care Overview  Goal: Plan of Care/Patient Progress Review  RN:  1.Pt will remain hemodynamically stable.    2.Tropinin/EKG WNL.   OT:  Pt working towards extubation at time of attempt, will see pt once extubated.

## 2018-07-04 ENCOUNTER — APPOINTMENT (OUTPATIENT)
Dept: SPEECH THERAPY | Facility: CLINIC | Age: 73
DRG: 234 | End: 2018-07-04
Payer: MEDICARE

## 2018-07-04 ENCOUNTER — APPOINTMENT (OUTPATIENT)
Dept: GENERAL RADIOLOGY | Facility: CLINIC | Age: 73
DRG: 234 | End: 2018-07-04
Payer: MEDICARE

## 2018-07-04 LAB
ABO + RH BLD: NORMAL
ANION GAP SERPL CALCULATED.3IONS-SCNC: 6 MMOL/L (ref 3–14)
ANION GAP SERPL CALCULATED.3IONS-SCNC: 7 MMOL/L (ref 3–14)
BASE EXCESS BLDA CALC-SCNC: 0.5 MMOL/L
BASE EXCESS BLDA CALC-SCNC: 0.6 MMOL/L
BASE EXCESS BLDV CALC-SCNC: 2.1 MMOL/L
BLD GP AB SCN SERPL QL: NORMAL
BLD GP AB SCN SERPL QL: NORMAL
BLD PROD TYP BPU: NORMAL
BLD UNIT ID BPU: 0
BLD UNIT ID BPU: 0
BLOOD BANK CMNT PATIENT-IMP: NORMAL
BLOOD BANK CMNT PATIENT-IMP: NORMAL
BLOOD PRODUCT CODE: NORMAL
BLOOD PRODUCT CODE: NORMAL
BPU ID: NORMAL
BPU ID: NORMAL
BUN SERPL-MCNC: 16 MG/DL (ref 7–30)
BUN SERPL-MCNC: 17 MG/DL (ref 7–30)
CALCIUM SERPL-MCNC: 7.4 MG/DL (ref 8.5–10.1)
CALCIUM SERPL-MCNC: 8.1 MG/DL (ref 8.5–10.1)
CHLORIDE SERPL-SCNC: 113 MMOL/L (ref 94–109)
CHLORIDE SERPL-SCNC: 119 MMOL/L (ref 94–109)
CO2 SERPL-SCNC: 24 MMOL/L (ref 20–32)
CO2 SERPL-SCNC: 28 MMOL/L (ref 20–32)
CREAT SERPL-MCNC: 1.25 MG/DL (ref 0.52–1.04)
CREAT SERPL-MCNC: 1.52 MG/DL (ref 0.52–1.04)
ERYTHROCYTE [DISTWIDTH] IN BLOOD BY AUTOMATED COUNT: 16.8 % (ref 10–15)
GFR SERPL CREATININE-BSD FRML MDRD: 34 ML/MIN/1.7M2
GFR SERPL CREATININE-BSD FRML MDRD: 42 ML/MIN/1.7M2
GLUCOSE BLDC GLUCOMTR-MCNC: 108 MG/DL (ref 70–99)
GLUCOSE BLDC GLUCOMTR-MCNC: 109 MG/DL (ref 70–99)
GLUCOSE BLDC GLUCOMTR-MCNC: 111 MG/DL (ref 70–99)
GLUCOSE BLDC GLUCOMTR-MCNC: 119 MG/DL (ref 70–99)
GLUCOSE BLDC GLUCOMTR-MCNC: 125 MG/DL (ref 70–99)
GLUCOSE BLDC GLUCOMTR-MCNC: 127 MG/DL (ref 70–99)
GLUCOSE BLDC GLUCOMTR-MCNC: 131 MG/DL (ref 70–99)
GLUCOSE BLDC GLUCOMTR-MCNC: 133 MG/DL (ref 70–99)
GLUCOSE BLDC GLUCOMTR-MCNC: 139 MG/DL (ref 70–99)
GLUCOSE BLDC GLUCOMTR-MCNC: 145 MG/DL (ref 70–99)
GLUCOSE BLDC GLUCOMTR-MCNC: 146 MG/DL (ref 70–99)
GLUCOSE BLDC GLUCOMTR-MCNC: 148 MG/DL (ref 70–99)
GLUCOSE BLDC GLUCOMTR-MCNC: 149 MG/DL (ref 70–99)
GLUCOSE BLDC GLUCOMTR-MCNC: 150 MG/DL (ref 70–99)
GLUCOSE BLDC GLUCOMTR-MCNC: 151 MG/DL (ref 70–99)
GLUCOSE BLDC GLUCOMTR-MCNC: 154 MG/DL (ref 70–99)
GLUCOSE BLDC GLUCOMTR-MCNC: 159 MG/DL (ref 70–99)
GLUCOSE BLDC GLUCOMTR-MCNC: 93 MG/DL (ref 70–99)
GLUCOSE SERPL-MCNC: 107 MG/DL (ref 70–99)
GLUCOSE SERPL-MCNC: 142 MG/DL (ref 70–99)
HCO3 BLD-SCNC: 26 MMOL/L (ref 21–28)
HCO3 BLD-SCNC: 26 MMOL/L (ref 21–28)
HCO3 BLDV-SCNC: 28 MMOL/L (ref 21–28)
HCT VFR BLD AUTO: 20 % (ref 35–47)
HGB BLD-MCNC: 6.4 G/DL (ref 11.7–15.7)
HGB BLD-MCNC: 7.7 G/DL (ref 11.7–15.7)
LACTATE BLD-SCNC: 0.5 MMOL/L (ref 0.7–2)
MAGNESIUM SERPL-MCNC: 2.2 MG/DL (ref 1.6–2.3)
MCH RBC QN AUTO: 27.4 PG (ref 26.5–33)
MCHC RBC AUTO-ENTMCNC: 32 G/DL (ref 31.5–36.5)
MCV RBC AUTO: 86 FL (ref 78–100)
NUM BPU REQUESTED: 3
O2/TOTAL GAS SETTING VFR VENT: ABNORMAL %
OXYHGB MFR BLD: 97 % (ref 92–100)
OXYHGB MFR BLDV: 64 %
PCO2 BLD: 44 MM HG (ref 35–45)
PCO2 BLD: 47 MM HG (ref 35–45)
PCO2 BLDV: 55 MM HG (ref 40–50)
PF4 HEPARIN CMPLX AB SER QL: NEGATIVE
PH BLD: 7.35 PH (ref 7.35–7.45)
PH BLD: 7.38 PH (ref 7.35–7.45)
PH BLDV: 7.32 PH (ref 7.32–7.43)
PHOSPHATE SERPL-MCNC: 3.9 MG/DL (ref 2.5–4.5)
PLATELET # BLD AUTO: 66 10E9/L (ref 150–450)
PO2 BLD: 125 MM HG (ref 80–105)
PO2 BLD: 177 MM HG (ref 80–105)
PO2 BLDV: 35 MM HG (ref 25–47)
POTASSIUM SERPL-SCNC: 3.6 MMOL/L (ref 3.4–5.3)
POTASSIUM SERPL-SCNC: 4.2 MMOL/L (ref 3.4–5.3)
RBC # BLD AUTO: 2.34 10E12/L (ref 3.8–5.2)
SODIUM SERPL-SCNC: 147 MMOL/L (ref 133–144)
SODIUM SERPL-SCNC: 149 MMOL/L (ref 133–144)
SPECIMEN EXP DATE BLD: NORMAL
SPECIMEN EXP DATE BLD: NORMAL
TRANSFUSION STATUS PATIENT QL: NORMAL
WBC # BLD AUTO: 7.6 10E9/L (ref 4–11)

## 2018-07-04 PROCEDURE — P9041 ALBUMIN (HUMAN),5%, 50ML: HCPCS

## 2018-07-04 PROCEDURE — 20000004 ZZH R&B ICU UMMC

## 2018-07-04 PROCEDURE — 40000225 ZZH STATISTIC SLP WARD VISIT: Performed by: SPEECH-LANGUAGE PATHOLOGIST

## 2018-07-04 PROCEDURE — A9270 NON-COVERED ITEM OR SERVICE: HCPCS | Mod: GY | Performed by: ANESTHESIOLOGY

## 2018-07-04 PROCEDURE — P9041 ALBUMIN (HUMAN),5%, 50ML: HCPCS | Performed by: STUDENT IN AN ORGANIZED HEALTH CARE EDUCATION/TRAINING PROGRAM

## 2018-07-04 PROCEDURE — P9016 RBC LEUKOCYTES REDUCED: HCPCS | Performed by: PHYSICIAN ASSISTANT

## 2018-07-04 PROCEDURE — 86850 RBC ANTIBODY SCREEN: CPT | Performed by: THORACIC SURGERY (CARDIOTHORACIC VASCULAR SURGERY)

## 2018-07-04 PROCEDURE — 86022 PLATELET ANTIBODIES: CPT | Performed by: THORACIC SURGERY (CARDIOTHORACIC VASCULAR SURGERY)

## 2018-07-04 PROCEDURE — 83605 ASSAY OF LACTIC ACID: CPT | Performed by: PHYSICIAN ASSISTANT

## 2018-07-04 PROCEDURE — A9270 NON-COVERED ITEM OR SERVICE: HCPCS | Mod: GY | Performed by: STUDENT IN AN ORGANIZED HEALTH CARE EDUCATION/TRAINING PROGRAM

## 2018-07-04 PROCEDURE — 25000128 H RX IP 250 OP 636

## 2018-07-04 PROCEDURE — 25000128 H RX IP 250 OP 636: Performed by: STUDENT IN AN ORGANIZED HEALTH CARE EDUCATION/TRAINING PROGRAM

## 2018-07-04 PROCEDURE — 85018 HEMOGLOBIN: CPT | Performed by: STUDENT IN AN ORGANIZED HEALTH CARE EDUCATION/TRAINING PROGRAM

## 2018-07-04 PROCEDURE — 25000132 ZZH RX MED GY IP 250 OP 250 PS 637: Mod: GY | Performed by: STUDENT IN AN ORGANIZED HEALTH CARE EDUCATION/TRAINING PROGRAM

## 2018-07-04 PROCEDURE — 84100 ASSAY OF PHOSPHORUS: CPT | Performed by: STUDENT IN AN ORGANIZED HEALTH CARE EDUCATION/TRAINING PROGRAM

## 2018-07-04 PROCEDURE — 25000125 ZZHC RX 250: Performed by: STUDENT IN AN ORGANIZED HEALTH CARE EDUCATION/TRAINING PROGRAM

## 2018-07-04 PROCEDURE — 71045 X-RAY EXAM CHEST 1 VIEW: CPT

## 2018-07-04 PROCEDURE — 94640 AIRWAY INHALATION TREATMENT: CPT

## 2018-07-04 PROCEDURE — 25000132 ZZH RX MED GY IP 250 OP 250 PS 637: Mod: GY | Performed by: ANESTHESIOLOGY

## 2018-07-04 PROCEDURE — 25000125 ZZHC RX 250: Performed by: PHYSICIAN ASSISTANT

## 2018-07-04 PROCEDURE — 82805 BLOOD GASES W/O2 SATURATION: CPT | Performed by: PHYSICIAN ASSISTANT

## 2018-07-04 PROCEDURE — 94660 CPAP INITIATION&MGMT: CPT

## 2018-07-04 PROCEDURE — 94640 AIRWAY INHALATION TREATMENT: CPT | Mod: 76

## 2018-07-04 PROCEDURE — 40000048 ZZH STATISTIC DAILY SWAN MONITORING

## 2018-07-04 PROCEDURE — 80048 BASIC METABOLIC PNL TOTAL CA: CPT | Performed by: STUDENT IN AN ORGANIZED HEALTH CARE EDUCATION/TRAINING PROGRAM

## 2018-07-04 PROCEDURE — 00000146 ZZHCL STATISTIC GLUCOSE BY METER IP

## 2018-07-04 PROCEDURE — 83735 ASSAY OF MAGNESIUM: CPT | Performed by: STUDENT IN AN ORGANIZED HEALTH CARE EDUCATION/TRAINING PROGRAM

## 2018-07-04 PROCEDURE — 40000275 ZZH STATISTIC RCP TIME EA 10 MIN

## 2018-07-04 PROCEDURE — 86900 BLOOD TYPING SEROLOGIC ABO: CPT | Performed by: THORACIC SURGERY (CARDIOTHORACIC VASCULAR SURGERY)

## 2018-07-04 PROCEDURE — 82803 BLOOD GASES ANY COMBINATION: CPT | Performed by: THORACIC SURGERY (CARDIOTHORACIC VASCULAR SURGERY)

## 2018-07-04 PROCEDURE — 86901 BLOOD TYPING SEROLOGIC RH(D): CPT | Performed by: THORACIC SURGERY (CARDIOTHORACIC VASCULAR SURGERY)

## 2018-07-04 PROCEDURE — 92610 EVALUATE SWALLOWING FUNCTION: CPT | Mod: GN | Performed by: SPEECH-LANGUAGE PATHOLOGIST

## 2018-07-04 PROCEDURE — 25000132 ZZH RX MED GY IP 250 OP 250 PS 637: Mod: GY | Performed by: PHYSICIAN ASSISTANT

## 2018-07-04 PROCEDURE — 85027 COMPLETE CBC AUTOMATED: CPT | Performed by: STUDENT IN AN ORGANIZED HEALTH CARE EDUCATION/TRAINING PROGRAM

## 2018-07-04 PROCEDURE — 40000014 ZZH STATISTIC ARTERIAL MONITORING DAILY

## 2018-07-04 PROCEDURE — 25000128 H RX IP 250 OP 636: Performed by: PHYSICIAN ASSISTANT

## 2018-07-04 RX ORDER — ALBUMIN, HUMAN INJ 5% 5 %
12.5 SOLUTION INTRAVENOUS ONCE
Status: COMPLETED | OUTPATIENT
Start: 2018-07-04 | End: 2018-07-04

## 2018-07-04 RX ORDER — KETOROLAC TROMETHAMINE 15 MG/ML
15 INJECTION, SOLUTION INTRAMUSCULAR; INTRAVENOUS EVERY 6 HOURS
Status: DISCONTINUED | OUTPATIENT
Start: 2018-07-04 | End: 2018-07-06

## 2018-07-04 RX ORDER — ASPIRIN 81 MG/1
81 TABLET, CHEWABLE ORAL DAILY
Status: DISCONTINUED | OUTPATIENT
Start: 2018-07-04 | End: 2018-07-06

## 2018-07-04 RX ORDER — ALBUMIN, HUMAN INJ 5% 5 %
SOLUTION INTRAVENOUS
Status: COMPLETED
Start: 2018-07-04 | End: 2018-07-04

## 2018-07-04 RX ORDER — METOPROLOL TARTRATE 1 MG/ML
5 INJECTION, SOLUTION INTRAVENOUS EVERY 6 HOURS
Status: DISCONTINUED | OUTPATIENT
Start: 2018-07-04 | End: 2018-07-06

## 2018-07-04 RX ADMIN — ALBUTEROL SULFATE 2.5 MG: 2.5 SOLUTION RESPIRATORY (INHALATION) at 01:24

## 2018-07-04 RX ADMIN — Medication 1 SPRAY: at 06:14

## 2018-07-04 RX ADMIN — ALBUMIN HUMAN 12.5 G: 0.05 INJECTION, SOLUTION INTRAVENOUS at 00:38

## 2018-07-04 RX ADMIN — HYDROMORPHONE HYDROCHLORIDE 0.3 MG: 1 INJECTION, SOLUTION INTRAMUSCULAR; INTRAVENOUS; SUBCUTANEOUS at 09:32

## 2018-07-04 RX ADMIN — FUROSEMIDE 40 MG: 10 INJECTION, SOLUTION INTRAVENOUS at 16:15

## 2018-07-04 RX ADMIN — ASPIRIN 81 MG CHEWABLE TABLET 81 MG: 81 TABLET CHEWABLE at 14:53

## 2018-07-04 RX ADMIN — Medication 500 MG: at 17:44

## 2018-07-04 RX ADMIN — ALBUMIN HUMAN 12.5 G: 0.05 INJECTION, SOLUTION INTRAVENOUS at 18:52

## 2018-07-04 RX ADMIN — METOROPROLOL TARTRATE 5 MG: 5 INJECTION, SOLUTION INTRAVENOUS at 12:26

## 2018-07-04 RX ADMIN — HUMAN INSULIN 4 UNITS/HR: 100 INJECTION, SOLUTION SUBCUTANEOUS at 23:54

## 2018-07-04 RX ADMIN — ALBUMIN HUMAN 12.5 G: 50 SOLUTION INTRAVENOUS at 18:52

## 2018-07-04 RX ADMIN — ESCITALOPRAM 20 MG: 20 TABLET, FILM COATED ORAL at 14:54

## 2018-07-04 RX ADMIN — LEVOTHYROXINE SODIUM 150 MCG: 150 TABLET ORAL at 14:54

## 2018-07-04 RX ADMIN — ALBUTEROL SULFATE 2.5 MG: 2.5 SOLUTION RESPIRATORY (INHALATION) at 07:38

## 2018-07-04 RX ADMIN — ALBUTEROL SULFATE 2.5 MG: 2.5 SOLUTION RESPIRATORY (INHALATION) at 15:35

## 2018-07-04 RX ADMIN — ALBUTEROL SULFATE 2.5 MG: 2.5 SOLUTION RESPIRATORY (INHALATION) at 05:20

## 2018-07-04 RX ADMIN — MUPIROCIN 0.5 G: 20 OINTMENT TOPICAL at 19:49

## 2018-07-04 RX ADMIN — LIDOCAINE 1 PATCH: 560 PATCH PERCUTANEOUS; TOPICAL; TRANSDERMAL at 09:33

## 2018-07-04 RX ADMIN — KETOROLAC TROMETHAMINE 15 MG: 15 INJECTION, SOLUTION INTRAMUSCULAR; INTRAVENOUS at 12:10

## 2018-07-04 RX ADMIN — GUAIFENESIN 200 MG: 100 SOLUTION ORAL at 16:15

## 2018-07-04 RX ADMIN — KETOROLAC TROMETHAMINE 15 MG: 15 INJECTION, SOLUTION INTRAMUSCULAR; INTRAVENOUS at 16:41

## 2018-07-04 RX ADMIN — HUMAN INSULIN 5 UNITS/HR: 100 INJECTION, SOLUTION SUBCUTANEOUS at 14:56

## 2018-07-04 RX ADMIN — HYDRALAZINE HYDROCHLORIDE 10 MG: 20 INJECTION INTRAMUSCULAR; INTRAVENOUS at 10:31

## 2018-07-04 RX ADMIN — Medication 1 SPRAY: at 10:16

## 2018-07-04 RX ADMIN — MUPIROCIN 0.5 G: 20 OINTMENT TOPICAL at 09:38

## 2018-07-04 RX ADMIN — PANTOPRAZOLE SODIUM 40 MG: 40 INJECTION, POWDER, FOR SOLUTION INTRAVENOUS at 09:32

## 2018-07-04 RX ADMIN — ALBUTEROL SULFATE 2.5 MG: 2.5 SOLUTION RESPIRATORY (INHALATION) at 11:52

## 2018-07-04 RX ADMIN — HEPARIN SODIUM 5000 UNITS: 5000 INJECTION, SOLUTION INTRAVENOUS; SUBCUTANEOUS at 00:21

## 2018-07-04 RX ADMIN — POTASSIUM CHLORIDE 20 MEQ: 29.8 INJECTION, SOLUTION INTRAVENOUS at 17:58

## 2018-07-04 RX ADMIN — KETOROLAC TROMETHAMINE 15 MG: 15 INJECTION, SOLUTION INTRAMUSCULAR; INTRAVENOUS at 23:46

## 2018-07-04 RX ADMIN — ALBUTEROL SULFATE 2.5 MG: 2.5 SOLUTION RESPIRATORY (INHALATION) at 21:33

## 2018-07-04 RX ADMIN — HUMAN INSULIN 4 UNITS/HR: 100 INJECTION, SOLUTION SUBCUTANEOUS at 07:30

## 2018-07-04 RX ADMIN — METOROPROLOL TARTRATE 5 MG: 5 INJECTION, SOLUTION INTRAVENOUS at 23:46

## 2018-07-04 ASSESSMENT — PAIN DESCRIPTION - DESCRIPTORS
DESCRIPTORS: SORE
DESCRIPTORS: HEADACHE

## 2018-07-04 NOTE — PROGRESS NOTES
CARDIAC ICU PROGRESS NOTE  07/04/18    CO-MORBIDITIES:   Acute chest pain   Hypokalemia  DM2, HTN, CKD3, COPD, fibromyalgia, anxiety, depression, lymphedema, CAD    ASSESSMENT: Mariel Ribeiro is a 73 year old female s/p 3v CABG (Left internal mammary artery to left anterior descending artery, Reversed saphenous vein graft to right posterior descending artery, Reversed saphenous vein graft to obtuse marginal artery. Extubated 7/3 to bipap. Initially lethargic improving with holding narcotics    CHANGES FOR TODAY:  - 1 U RBC this am  - Remove swan line  - Ok for toradol 15mg q 6hrs  - SLP eval for failed swallow study. Ok for meds with applesauce  - HIT Negative. Resume subQ heparin    PLAN:  Neuro/ pain/ sedation:  - Monitor neurological status. Notify the MD for any acute changes in exam.  - start Toradol 15mg q 6hrs  - Escitalopram, pregabalin, hold Trazadone  - PRN tramadol, PRN dilaudid for pain  - Hold sedative medications continued drowsiness  - Metoprolol 5mg IV q 6hrs. Ok to hold for low maps  - Possible transfer to floor tomorrow    Pulmonary care:   - extubated to bipap 10/5, 40%, now on nasal cannula  - Supplemental oxygen to keep saturation above 92 %.  - Incentive spirometer every 15- 30 minutes when awake and off bipap  - albuterol nebs    Cardiovascular:    # 3v CAB LIMA to LAD, SVG to OM, SVG to PDA  - ASA 325mg,   - Monitor hemodynamic status.   - Off pressors    GI care:   - NPO while on bipap  - Senokot    Fluids/ Electrolytes/ Nutrition:   - D5 1/2NS + 20K  for IV fluid hydration  - Lyte replacement per protocol  - No indication for parenteral nutrition.    Renal/ Fluid Balance:    - Urine output is adequate so far. Continue liriano  - Furosemide 40mg BID  - Will continue to monitor intake and output.    Endocrine:    - insulin gtt  - PTA levothyroxine    ID/ Antibiotics:  - Perioperative clindamycin    Heme:     #postop anemia  - Hemoglobin stable    MSK:  -PT/OT    Prophylaxis:    - Heparin  5000 TID  - SCDs    Lines/ tubes/ drains:  - CVC  - Freeland  - PIV x 2  - Art line  - CT x 3  - Gutierrez    Disposition:  - [facility] ICU.     ====================================    SUBJECTIVE:   - Improving lethargy compared to yesterday. 1 U RBC given this am for hgb to 6.4. Thrombocytopenic today, HIT negative. Continuing to have pain while still being drowsy off narcotics.    OBJECTIVE:   1. VITAL SIGNS:   Temp:  [98.1  F (36.7  C)-101.1  F (38.4  C)] 98.1  F (36.7  C)  Heart Rate:  [] 98  Resp:  [13-30] 20  BP: (118)/(60) 118/60  MAP:  [69 mmHg-96 mmHg] 80 mmHg  Arterial Line BP: (105-145)/(43-66) 127/57  FiO2 (%):  [2 %-40 %] 30 %  SpO2:  [93 %-100 %] 96 %  Ventilation Mode: CPAP/PS  (Continuous positive airway pressure with Pressure Support)  FiO2 (%): 30 %  Rate Set (breaths/minute): 16 breaths/min  Tidal Volume Set (mL): 550 mL  PEEP (cm H2O): 5 cmH2O  Pressure Support (cm H2O): 7 cmH2O  Oxygen Concentration (%): 40 %  Resp: 20    2. INTAKE/ OUTPUT:   I/O last 3 completed shifts:  In: 1837.44 [P.O.:90; I.V.:1197.44]  Out: 2710 [Urine:2410; Chest Tube:300]    3. PHYSICAL EXAMINATION:   General: obese female  Neuro: A&O, NAD, resting quietly  Resp: Breathing non-labored on bipap. Surgical dressings in place. CTs with serosang output  CV: RRR  Abdomen: Soft, Non-distended, Non-tender  Incisions: Bandages in place  Extremities: warm and well perfused    4. INVESTIGATIONS:   Arterial Blood Gases     Recent Labs  Lab 07/04/18  1111 07/04/18  0359 07/03/18  1458 07/03/18  1145   PH 7.38 7.35 7.33* 7.31*   PCO2 44 47* 43 41   PO2 125* 177* 134* 144*   HCO3 26 26 22 21     Complete Blood Count     Recent Labs  Lab 07/04/18  1008 07/04/18  0400 07/03/18  1221 07/03/18  0358 07/02/18  1606   WBC  --  7.6 17.2* 16.7* 16.3*   HGB 7.7* 6.4* 7.6* 7.8* 9.4*   PLT  --  66* 124* 114* 100*     Basic Metabolic Panel    Recent Labs  Lab 07/04/18  1601 07/04/18  0400 07/03/18  1221 07/03/18  0358   * 147* 144 145*   POTASSIUM  3.6 4.2 4.5 3.7   CHLORIDE 119* 113* 112* 114*   CO2 24 28 25 21   BUN 16 17 17 15   CR 1.25* 1.52* 1.56* 1.40*   * 142* 193* 177*     Liver Function Tests    Recent Labs  Lab 07/03/18  0637 07/02/18  1606 07/02/18  1436 07/01/18  0621 06/29/18  1713   AST  --   --   --  20 22   ALT  --   --   --  26 28   ALKPHOS  --   --   --  100 108   BILITOTAL  --   --   --  1.0 0.9   ALBUMIN  --   --   --  3.1* 3.4   INR 1.30* 1.40* 1.52*  --  1.01     Pancreatic Enzymes  No lab results found in last 7 days.  Coagulation Profile    Recent Labs  Lab 07/03/18  0637 07/02/18  1606 07/02/18  1436 06/29/18  1713   INR 1.30* 1.40* 1.52* 1.01   PTT  --  37 33  --      Lactate  Invalid input(s): LACTATE    5. RADIOLOGY:   CXR:  1. Interval removal of endotracheal tube other support devices are  stable.  2. Enlarged cardiomediastinal silhouette, low lung volumes and  retrocardiac opacities are unchanged.    =========================================      Patient seen, findings and plan discussed with surgical ICU staff, Dr. Dumont.    Jose Armando Sahni MD   Gen Surg PGY-3  P: [phone]

## 2018-07-04 NOTE — PROGRESS NOTES
Problem: s/p 3 vessels CABG  Goal: maintain respiratory stability  Outcome: Improving          Ongoing fever, Tmax 101.6. Alert, garbled speech. Denies pain. Pressors stopped @0200, MAP goal maintained >70, PA 28/18 SvO2 64, SVR 56.-Please see hemodynamics flowsheet. CVP 9 after 250cc Albumin.  Bipap 30%, CT 20cc Q2hrs, 150cc UOP/Q2, PO meds on hold d/t failed bedside swallow. 1u PRBC infusing. Continue to monitor and intervene as indicated.

## 2018-07-04 NOTE — PLAN OF CARE
Problem: Patient Care Overview  Goal: Plan of Care/Patient Progress Review  RN:  1.Pt will remain hemodynamically stable.    2.Tropinin/EKG WNL.   Discharge Planner SLP   Patient plan for discharge: Pt did not state  Current status: Clinical swallowing evaluation completed per MD orders. Pt demonstrates moderately severe oropharyngeal dysphagia as characterized by overt clincial s/sx of aspiration/penetration on thin liquid and nectar thick liquid trials. Repeated swallows demonstrated on puree consistency. Pt has decreased cough strength and thus decreased ability to adequately protect her airway. She is quite confused and requires stimulation to remain on task during evaluation. She has upper and lower dentures which are inpalce during evaluation. She demonstrates slight lingual, labial and buccal weakness during evaluation. Recommend NPO with alternative methods of nutrition/hydration/medication. Pt may have pills crushed in applesauce in minimal amounts for critical meds. Sit pt upright for any medication provided. SLP to follow.   Barriers to return to prior living situation: dysphagia  Recommendations for discharge: inpatient rehab  Rationale for recommendations: Anticipate ongoing needs following discharge, at this time.        Entered by: Opal Carvajal 07/04/2018 3:15 PM

## 2018-07-04 NOTE — PROGRESS NOTES
07/04/18 1458   General Information   Onset Date 06/29/18   Start of Care Date 07/04/18   Referring Physician Dr. Augustin Ramos   Patient Profile Review/OT: Additional Occupational Profile Info See Profile for full history and prior level of function   Patient/Family Goals Statement Pt stated desire to eat.    Swallowing Evaluation Bedside swallow evaluation   Behaviorial Observations Distractible;Impulsive;Confused;Lethargic   Respiratory Status O2 Supply   Type of O2 supply Nasal cannula   Comments Orders received for clinical swallowing evaluation. 73 year old female s/p 7/2/18 3v CABG (Left internal mammary artery to left anterior descending artery, Reversed saphenous vein graft to right posterior descending artery, Reversed saphenous vein graft to obtuse marginal artery 2). Pt extubated yesterday and failed RN swallow screen x3 prior to consulting SLP services. Pt has a history of dysphagia during illness.    Clinical Swallow Evaluation   Oral Musculature generally intact   Dentition upper and lower dentures   Mucosal Quality dry   Mandibular Strength and Mobility impaired   Oral Labial Strength and Mobility impaired retraction;impaired coordination   Lingual Strength and Mobility impaired coordination   Velar Elevation intact   Buccal Strength and Mobility impaired   Laryngeal Function Cough;Voicing initiated;Swallow   Oral Musculature Comments Pt demonstrated decreased cough strength and hoarse voice.    Clinical Swallow Eval: Thin Liquid Texture Trial   Mode of Presentation, Thin Liquids spoon;straw;cup;self-fed   Volume of Liquid or Food Presented ice chip x2, tsp water x3, straw sip x3   Oral Phase of Swallow WFL   Pharyngeal Phase of Swallow impaired;coughing/choking;wet vocal quality after swallow   Diagnostic Statement Pt demonstrated overt clinical s/sx of aspiration/penetration on thin liquid trials.    Clinical Swallow Eval: Nectar Thick Liquid Texture Trial   Mode of Presentation, Nectar  spoon;straw;fed by clinician   Volume of Nectar Presented Tsp x3, straw sip x2   Oral Phase, Jansen WFL   Pharyngeal Phase, Nectar impaired;repeated swallows;coughing/choking   Diagnostic Statement Pt demonstrated overt clinical s/sx of aspiration/penetration on nectar thick liquid trials.    Clinical Swallow Eval: Puree Solid Texture Trial   Mode of Presentation, Puree spoon;fed by clinician   Volume of Puree Presented tsp x4   Oral Phase, Puree WFL   Pharyngeal Phase, Puree repeated swallows   Diagnostic Statement No overt clinical s/sx of aspiration/penetration noted on puree consistency. Pt did have 2 swallows for each spoonful.    Swallow Compensations   Swallow Compensations Reduce amounts   General Therapy Interventions   Planned Therapy Interventions Dysphagia Treatment   Dysphagia treatment Oropharyngeal exercise training;Modified diet education;Instruction of safe swallow strategies   Swallow Eval: Clinical Impressions   Skilled Criteria for Therapy Intervention Skilled criteria met.  Treatment indicated.   Functional Assessment Scale (FAS) 2   Treatment Diagnosis Moderately severe oropharyngeal dysphagia   Diet texture recommendations NPO  (ok for pills crushed in small amount of applesauce)   Demonstrates Need for Referral to Another Service occupational therapy;dietitian;physical therapy   Therapy Frequency daily   Predicted Duration of Therapy Intervention (days/wks) 3 weeks   Anticipated Discharge Disposition inpatient rehabilitation facility   Risks and Benefits of Treatment have been explained. Yes   Patient, family and/or staff in agreement with Plan of Care Yes   Clinical Impression Comments Clinical swallowing evaluation completed per MD orders. Pt demonstrates moderately severe oropharyngeal dysphagia as characterized by overt clincial s/sx of aspiration/penetration on thin liquid and nectar thick liquid trials. Repeated swallows demonstrated on puree consistency. Pt has decreased cough strength  and thus decreased ability to adequately protect her airway. She is quite confused and requires stimulation to remain on task during evaluation. She has upper and lower dentures which are inpalce during evaluation. She demonstrates slight lingual, labial and buccal weakness during evaluation. Recommend NPO with alternative methods of nutrition/hydration/medication. Pt may have pills crushed in applesauce in minimal amounts for critical meds. Sit pt upright for any medication provided. SLP to follow.    Total Evaluation Time   Total Evaluation Time (Minutes) 17

## 2018-07-04 NOTE — PROGRESS NOTES
CVTS PROGRESS NOTE  July 3, 2018      CO-MORBIDITIES:   Acute chest pain   Hypokalemia  DM2, HTN, CKD3, COPD, fibromyalgia, anxiety, depression, lymphedema, CAD    ASSESSMENT: Mariel Ribeiro is a 73 year old female s/p 7/2/18 3v CABG (Left internal mammary artery to left anterior descending artery, Reversed saphenous vein graft to right posterior descending artery, Reversed saphenous vein graft to obtuse marginal artery 2)    CHANGES FOR TODAY:  - extubated to bipap  - repeat lactic acid improving  - wean from pressors as able  - Avoid narcotics if able given sedation    PLAN:  Neuro/ pain/ sedation:  - Monitor neurological status. Notify the MD for any acute changes in exam.  - Escitalopram, pregabalin, hold Trazadone  - PRN tramadol, PRN dilaudid for pain  - Hold sedative medications given drowsiness post extubation.    Pulmonary care:   - extubated to bipap 10/5, 40%  - Supplemental oxygen to keep saturation above 92 %.  - Incentive spirometer every 15- 30 minutes when awake and off bipap  - albuterol nebs    Cardiovascular:    # 3v CAB LIMA to LAD, SVG to OM, SVG to PDA  - ASA 325mg,   - Monitor hemodynamic status.   - Wean norepinephrine and epinephrine as able    GI care:   - NPO while on bipap  - Senokot    Fluids/ Electrolytes/ Nutrition:   - D5 1/2NS + 20K  for IV fluid hydration  - Lyte replacement per protocol  - No indication for parenteral nutrition.    Renal/ Fluid Balance:    - Urine output is adequate so far. Continue liriano  - Furosemide 40mg BID  - Will continue to monitor intake and output.    Endocrine:    - insulin gtt  - PTA levothyroxine    ID/ Antibiotics:  - Perioperative clindamycin    Heme:     #postop anemia  - Hemoglobin stable    MSK:  -PT/OT    Prophylaxis:    - Heparin 5000 TID  - SCDs    Lines/ tubes/ drains:  - CVC  - Kane  - PIV x 2  - Art line  - CT x 3  - Liriano    Disposition:  - [facility] ICU.       ====================================    SUBJECTIVE:   - Improving blood gases  overnight. Did have apnea episode during pressure support trial, improving mental status during the morning prompting extubation to bipap given history of BELEN on home cpap.    OBJECTIVE:   1. VITAL SIGNS:   Temp:  [100  F (37.8  C)-101.5  F (38.6  C)] 100.6  F (38.1  C)  Heart Rate:  [] 104  Resp:  [10-18] 16  MAP:  [58 mmHg-85 mmHg] 75 mmHg  Arterial Line BP: ()/(37-63) 119/52  FiO2 (%):  [40 %] 40 %  SpO2:  [97 %-100 %] 99 %  Ventilation Mode: CPAP/PS  (Continuous positive airway pressure with Pressure Support)  FiO2 (%): 40 %  Rate Set (breaths/minute): 16 breaths/min  Tidal Volume Set (mL): 550 mL  PEEP (cm H2O): 5 cmH2O  Pressure Support (cm H2O): 7 cmH2O  Oxygen Concentration (%): 40 %  Resp: 16    2. INTAKE/ OUTPUT:   I/O last 3 completed shifts:  In: 5381.51 [I.V.:3851.51; NG/GT:280]  Out: 2060 [Urine:1480; Chest Tube:580]    3. PHYSICAL EXAMINATION:   General: obese female  Neuro: A&O, NAD, resting quietly  Resp: Breathing non-labored on bipap. Surgical dressings in place. CTs with serosang output  CV: RRR  Abdomen: Soft, Non-distended, Non-tender  Incisions: Bandages in place  Extremities: warm and well perfused    4. INVESTIGATIONS:   Arterial Blood Gases     Recent Labs  Lab 07/03/18  1458 07/03/18  1145 07/03/18  1004 07/03/18  0754   PH 7.33* 7.31* 7.33* 7.38   PCO2 43 41 40 35   PO2 134* 144* 142* 141*   HCO3 22 21 21 21     Complete Blood Count     Recent Labs  Lab 07/03/18  1221 07/03/18  0358 07/02/18  1606 07/02/18  1510 07/02/18  1436  06/30/18  0655   WBC 17.2* 16.7* 16.3*  --   --   --  7.2   HGB 7.6* 7.8* 9.4* 9.9*  --   < > 12.4   * 114* 100*  --  63*  --  132*   < > = values in this interval not displayed.  Basic Metabolic Panel    Recent Labs  Lab 07/03/18  1221 07/03/18  0358 07/02/18  1606 07/02/18  1510  06/30/18  0655    145* 144 141  < > 140   POTASSIUM 4.5 3.7 3.3* 2.9*  < > 3.7   CHLORIDE 112* 114* 112*  --   --  106   CO2 25 21 23  --   --  27   BUN 17 15 14   --   --  19   CR 1.56* 1.40* 1.05*  --   --  1.18*   * 177* 181* 184*  < > 162*   < > = values in this interval not displayed.  Liver Function Tests    Recent Labs  Lab 07/03/18  0637 07/02/18  1606 07/02/18  1436 07/01/18  0621 06/29/18  1713   AST  --   --   --  20 22   ALT  --   --   --  26 28   ALKPHOS  --   --   --  100 108   BILITOTAL  --   --   --  1.0 0.9   ALBUMIN  --   --   --  3.1* 3.4   INR 1.30* 1.40* 1.52*  --  1.01     Pancreatic Enzymes  No lab results found in last 7 days.  Coagulation Profile    Recent Labs  Lab 07/03/18  0637 07/02/18  1606 07/02/18  1436 06/29/18  1713   INR 1.30* 1.40* 1.52* 1.01   PTT  --  37 33  --      Lactate  Invalid input(s): LACTATE    5. RADIOLOGY:   No results found for this or any previous visit (from the past 24 hour(s)).    =========================================    Patient seen, findings and plan discussed with CVTS team    Jose Armando Sahni MD   Gen Surg PGY-3  P: [phone]

## 2018-07-05 ENCOUNTER — APPOINTMENT (OUTPATIENT)
Dept: OCCUPATIONAL THERAPY | Facility: CLINIC | Age: 73
DRG: 234 | End: 2018-07-05
Payer: MEDICARE

## 2018-07-05 ENCOUNTER — APPOINTMENT (OUTPATIENT)
Dept: OCCUPATIONAL THERAPY | Facility: CLINIC | Age: 73
DRG: 234 | End: 2018-07-05
Attending: PHYSICIAN ASSISTANT
Payer: MEDICARE

## 2018-07-05 ENCOUNTER — APPOINTMENT (OUTPATIENT)
Dept: SPEECH THERAPY | Facility: CLINIC | Age: 73
DRG: 234 | End: 2018-07-05
Payer: MEDICARE

## 2018-07-05 ENCOUNTER — APPOINTMENT (OUTPATIENT)
Dept: GENERAL RADIOLOGY | Facility: CLINIC | Age: 73
DRG: 234 | End: 2018-07-05
Payer: MEDICARE

## 2018-07-05 LAB
ANION GAP SERPL CALCULATED.3IONS-SCNC: 6 MMOL/L (ref 3–14)
BUN SERPL-MCNC: 19 MG/DL (ref 7–30)
CALCIUM SERPL-MCNC: 8.2 MG/DL (ref 8.5–10.1)
CHLORIDE SERPL-SCNC: 115 MMOL/L (ref 94–109)
CO2 SERPL-SCNC: 27 MMOL/L (ref 20–32)
CREAT SERPL-MCNC: 1.44 MG/DL (ref 0.52–1.04)
ERYTHROCYTE [DISTWIDTH] IN BLOOD BY AUTOMATED COUNT: 16.3 % (ref 10–15)
GFR SERPL CREATININE-BSD FRML MDRD: 36 ML/MIN/1.7M2
GLUCOSE BLDC GLUCOMTR-MCNC: 105 MG/DL (ref 70–99)
GLUCOSE BLDC GLUCOMTR-MCNC: 112 MG/DL (ref 70–99)
GLUCOSE BLDC GLUCOMTR-MCNC: 116 MG/DL (ref 70–99)
GLUCOSE BLDC GLUCOMTR-MCNC: 118 MG/DL (ref 70–99)
GLUCOSE BLDC GLUCOMTR-MCNC: 119 MG/DL (ref 70–99)
GLUCOSE BLDC GLUCOMTR-MCNC: 129 MG/DL (ref 70–99)
GLUCOSE BLDC GLUCOMTR-MCNC: 131 MG/DL (ref 70–99)
GLUCOSE BLDC GLUCOMTR-MCNC: 133 MG/DL (ref 70–99)
GLUCOSE BLDC GLUCOMTR-MCNC: 151 MG/DL (ref 70–99)
GLUCOSE BLDC GLUCOMTR-MCNC: 152 MG/DL (ref 70–99)
GLUCOSE BLDC GLUCOMTR-MCNC: 155 MG/DL (ref 70–99)
GLUCOSE BLDC GLUCOMTR-MCNC: 168 MG/DL (ref 70–99)
GLUCOSE BLDC GLUCOMTR-MCNC: 173 MG/DL (ref 70–99)
GLUCOSE BLDC GLUCOMTR-MCNC: 186 MG/DL (ref 70–99)
GLUCOSE SERPL-MCNC: 134 MG/DL (ref 70–99)
HCT VFR BLD AUTO: 22.5 % (ref 35–47)
HGB BLD-MCNC: 7.1 G/DL (ref 11.7–15.7)
INTERPRETATION ECG - MUSE: NORMAL
INTERPRETATION ECG - MUSE: NORMAL
MAGNESIUM SERPL-MCNC: 2.2 MG/DL (ref 1.6–2.3)
MCH RBC QN AUTO: 28.2 PG (ref 26.5–33)
MCHC RBC AUTO-ENTMCNC: 31.6 G/DL (ref 31.5–36.5)
MCV RBC AUTO: 89 FL (ref 78–100)
PHOSPHATE SERPL-MCNC: 2.4 MG/DL (ref 2.5–4.5)
PLATELET # BLD AUTO: 58 10E9/L (ref 150–450)
POTASSIUM SERPL-SCNC: 4.2 MMOL/L (ref 3.4–5.3)
RBC # BLD AUTO: 2.52 10E12/L (ref 3.8–5.2)
SODIUM SERPL-SCNC: 147 MMOL/L (ref 133–144)
WBC # BLD AUTO: 7.4 10E9/L (ref 4–11)

## 2018-07-05 PROCEDURE — 40000802 ZZH SITE CHECK

## 2018-07-05 PROCEDURE — A9270 NON-COVERED ITEM OR SERVICE: HCPCS | Mod: GY | Performed by: STUDENT IN AN ORGANIZED HEALTH CARE EDUCATION/TRAINING PROGRAM

## 2018-07-05 PROCEDURE — 25000128 H RX IP 250 OP 636: Performed by: STUDENT IN AN ORGANIZED HEALTH CARE EDUCATION/TRAINING PROGRAM

## 2018-07-05 PROCEDURE — 25000132 ZZH RX MED GY IP 250 OP 250 PS 637: Mod: GY | Performed by: STUDENT IN AN ORGANIZED HEALTH CARE EDUCATION/TRAINING PROGRAM

## 2018-07-05 PROCEDURE — 25000132 ZZH RX MED GY IP 250 OP 250 PS 637: Mod: GY | Performed by: PHYSICIAN ASSISTANT

## 2018-07-05 PROCEDURE — 25800025 ZZH RX 258: Performed by: STUDENT IN AN ORGANIZED HEALTH CARE EDUCATION/TRAINING PROGRAM

## 2018-07-05 PROCEDURE — 21400006 ZZH R&B CCU INTERMEDIATE UMMC

## 2018-07-05 PROCEDURE — 84100 ASSAY OF PHOSPHORUS: CPT | Performed by: STUDENT IN AN ORGANIZED HEALTH CARE EDUCATION/TRAINING PROGRAM

## 2018-07-05 PROCEDURE — 94640 AIRWAY INHALATION TREATMENT: CPT | Mod: 76

## 2018-07-05 PROCEDURE — 94660 CPAP INITIATION&MGMT: CPT

## 2018-07-05 PROCEDURE — 85027 COMPLETE CBC AUTOMATED: CPT | Performed by: STUDENT IN AN ORGANIZED HEALTH CARE EDUCATION/TRAINING PROGRAM

## 2018-07-05 PROCEDURE — 97140 MANUAL THERAPY 1/> REGIONS: CPT | Mod: GO | Performed by: OCCUPATIONAL THERAPIST

## 2018-07-05 PROCEDURE — 25000125 ZZHC RX 250: Performed by: STUDENT IN AN ORGANIZED HEALTH CARE EDUCATION/TRAINING PROGRAM

## 2018-07-05 PROCEDURE — 25000132 ZZH RX MED GY IP 250 OP 250 PS 637: Mod: GY | Performed by: THORACIC SURGERY (CARDIOTHORACIC VASCULAR SURGERY)

## 2018-07-05 PROCEDURE — 97110 THERAPEUTIC EXERCISES: CPT | Mod: GO | Performed by: OCCUPATIONAL THERAPIST

## 2018-07-05 PROCEDURE — 71045 X-RAY EXAM CHEST 1 VIEW: CPT

## 2018-07-05 PROCEDURE — 83735 ASSAY OF MAGNESIUM: CPT | Performed by: STUDENT IN AN ORGANIZED HEALTH CARE EDUCATION/TRAINING PROGRAM

## 2018-07-05 PROCEDURE — 00000146 ZZHCL STATISTIC GLUCOSE BY METER IP

## 2018-07-05 PROCEDURE — 40000556 ZZH STATISTIC PERIPHERAL IV START W US GUIDANCE

## 2018-07-05 PROCEDURE — 97168 OT RE-EVAL EST PLAN CARE: CPT | Mod: GO | Performed by: OCCUPATIONAL THERAPIST

## 2018-07-05 PROCEDURE — 40000133 ZZH STATISTIC OT WARD VISIT: Performed by: OCCUPATIONAL THERAPIST

## 2018-07-05 PROCEDURE — 40000257 ZZH STATISTIC CONSULT NO CHARGE VASC ACCESS

## 2018-07-05 PROCEDURE — A9270 NON-COVERED ITEM OR SERVICE: HCPCS | Mod: GY | Performed by: PHYSICIAN ASSISTANT

## 2018-07-05 PROCEDURE — 80048 BASIC METABOLIC PNL TOTAL CA: CPT | Performed by: STUDENT IN AN ORGANIZED HEALTH CARE EDUCATION/TRAINING PROGRAM

## 2018-07-05 PROCEDURE — 25000128 H RX IP 250 OP 636: Performed by: PHYSICIAN ASSISTANT

## 2018-07-05 PROCEDURE — 40000275 ZZH STATISTIC RCP TIME EA 10 MIN

## 2018-07-05 PROCEDURE — 25000125 ZZHC RX 250: Performed by: PHYSICIAN ASSISTANT

## 2018-07-05 PROCEDURE — A9270 NON-COVERED ITEM OR SERVICE: HCPCS | Mod: GY | Performed by: ANESTHESIOLOGY

## 2018-07-05 PROCEDURE — 97535 SELF CARE MNGMENT TRAINING: CPT | Mod: GO | Performed by: OCCUPATIONAL THERAPIST

## 2018-07-05 PROCEDURE — 25000132 ZZH RX MED GY IP 250 OP 250 PS 637: Mod: GY | Performed by: ANESTHESIOLOGY

## 2018-07-05 PROCEDURE — A9270 NON-COVERED ITEM OR SERVICE: HCPCS | Mod: GY | Performed by: THORACIC SURGERY (CARDIOTHORACIC VASCULAR SURGERY)

## 2018-07-05 PROCEDURE — 92526 ORAL FUNCTION THERAPY: CPT | Mod: GN | Performed by: SPEECH-LANGUAGE PATHOLOGIST

## 2018-07-05 PROCEDURE — 40000225 ZZH STATISTIC SLP WARD VISIT: Performed by: SPEECH-LANGUAGE PATHOLOGIST

## 2018-07-05 RX ORDER — FUROSEMIDE 10 MG/ML
40 INJECTION INTRAMUSCULAR; INTRAVENOUS ONCE
Status: COMPLETED | OUTPATIENT
Start: 2018-07-05 | End: 2018-07-05

## 2018-07-05 RX ORDER — FUROSEMIDE 40 MG
40 TABLET ORAL
Status: DISCONTINUED | OUTPATIENT
Start: 2018-07-06 | End: 2018-07-05

## 2018-07-05 RX ADMIN — SENNOSIDES AND DOCUSATE SODIUM 2 TABLET: 8.6; 5 TABLET ORAL at 20:22

## 2018-07-05 RX ADMIN — ATORVASTATIN CALCIUM 40 MG: 40 TABLET, FILM COATED ORAL at 20:14

## 2018-07-05 RX ADMIN — KETOROLAC TROMETHAMINE 15 MG: 15 INJECTION, SOLUTION INTRAMUSCULAR; INTRAVENOUS at 04:53

## 2018-07-05 RX ADMIN — GUAIFENESIN 200 MG: 100 SOLUTION ORAL at 20:14

## 2018-07-05 RX ADMIN — SENNOSIDES AND DOCUSATE SODIUM 2 TABLET: 8.6; 5 TABLET ORAL at 08:34

## 2018-07-05 RX ADMIN — ESCITALOPRAM 20 MG: 20 TABLET, FILM COATED ORAL at 08:34

## 2018-07-05 RX ADMIN — GUAIFENESIN 200 MG: 100 SOLUTION ORAL at 11:40

## 2018-07-05 RX ADMIN — FUROSEMIDE 40 MG: 10 INJECTION, SOLUTION INTRAVENOUS at 04:53

## 2018-07-05 RX ADMIN — GUAIFENESIN 200 MG: 100 SOLUTION ORAL at 16:26

## 2018-07-05 RX ADMIN — LIDOCAINE 1 PATCH: 560 PATCH PERCUTANEOUS; TOPICAL; TRANSDERMAL at 08:34

## 2018-07-05 RX ADMIN — PREGABALIN 100 MG: 20 SOLUTION ORAL at 08:54

## 2018-07-05 RX ADMIN — PREGABALIN 100 MG: 20 SOLUTION ORAL at 22:10

## 2018-07-05 RX ADMIN — ALBUTEROL SULFATE 2.5 MG: 2.5 SOLUTION RESPIRATORY (INHALATION) at 14:55

## 2018-07-05 RX ADMIN — METOROPROLOL TARTRATE 5 MG: 5 INJECTION, SOLUTION INTRAVENOUS at 18:18

## 2018-07-05 RX ADMIN — ACETAMINOPHEN 650 MG: 325 TABLET, FILM COATED ORAL at 16:36

## 2018-07-05 RX ADMIN — ALBUTEROL SULFATE 2.5 MG: 2.5 SOLUTION RESPIRATORY (INHALATION) at 01:43

## 2018-07-05 RX ADMIN — HUMAN INSULIN 3 UNITS/HR: 100 INJECTION, SOLUTION SUBCUTANEOUS at 19:06

## 2018-07-05 RX ADMIN — KETOROLAC TROMETHAMINE 15 MG: 15 INJECTION, SOLUTION INTRAMUSCULAR; INTRAVENOUS at 18:18

## 2018-07-05 RX ADMIN — HYDROMORPHONE HYDROCHLORIDE 0.3 MG: 1 INJECTION, SOLUTION INTRAMUSCULAR; INTRAVENOUS; SUBCUTANEOUS at 14:11

## 2018-07-05 RX ADMIN — Medication 500 MG: at 08:34

## 2018-07-05 RX ADMIN — POTASSIUM CHLORIDE, DEXTROSE MONOHYDRATE AND SODIUM CHLORIDE: 150; 5; 450 INJECTION, SOLUTION INTRAVENOUS at 05:39

## 2018-07-05 RX ADMIN — GUAIFENESIN 200 MG: 100 SOLUTION ORAL at 08:38

## 2018-07-05 RX ADMIN — HYDRALAZINE HYDROCHLORIDE 10 MG: 20 INJECTION INTRAMUSCULAR; INTRAVENOUS at 13:30

## 2018-07-05 RX ADMIN — POTASSIUM CHLORIDE, DEXTROSE MONOHYDRATE AND SODIUM CHLORIDE: 150; 5; 450 INJECTION, SOLUTION INTRAVENOUS at 18:45

## 2018-07-05 RX ADMIN — Medication 1 SPRAY: at 08:38

## 2018-07-05 RX ADMIN — METOROPROLOL TARTRATE 5 MG: 5 INJECTION, SOLUTION INTRAVENOUS at 07:08

## 2018-07-05 RX ADMIN — ALBUTEROL SULFATE 2.5 MG: 2.5 SOLUTION RESPIRATORY (INHALATION) at 08:52

## 2018-07-05 RX ADMIN — Medication 500 MG: at 18:19

## 2018-07-05 RX ADMIN — Medication 3 MG: at 22:10

## 2018-07-05 RX ADMIN — POTASSIUM PHOSPHATE, MONOBASIC AND POTASSIUM PHOSPHATE, DIBASIC 15 MMOL: 224; 236 INJECTION, SOLUTION INTRAVENOUS at 06:43

## 2018-07-05 RX ADMIN — MUPIROCIN 0.5 G: 20 OINTMENT TOPICAL at 20:14

## 2018-07-05 RX ADMIN — ASPIRIN 81 MG CHEWABLE TABLET 81 MG: 81 TABLET CHEWABLE at 08:34

## 2018-07-05 RX ADMIN — FUROSEMIDE 40 MG: 10 INJECTION, SOLUTION INTRAVENOUS at 14:42

## 2018-07-05 RX ADMIN — MUPIROCIN 0.5 G: 20 OINTMENT TOPICAL at 08:38

## 2018-07-05 RX ADMIN — PANTOPRAZOLE SODIUM 40 MG: 40 INJECTION, POWDER, FOR SOLUTION INTRAVENOUS at 08:35

## 2018-07-05 RX ADMIN — METOROPROLOL TARTRATE 5 MG: 5 INJECTION, SOLUTION INTRAVENOUS at 11:36

## 2018-07-05 RX ADMIN — LEVOTHYROXINE SODIUM 150 MCG: 150 TABLET ORAL at 08:34

## 2018-07-05 RX ADMIN — FEBUXOSTAT 40 MG: 40 TABLET ORAL at 20:14

## 2018-07-05 RX ADMIN — AMLODIPINE BESYLATE 5 MG: 5 TABLET ORAL at 08:34

## 2018-07-05 RX ADMIN — KETOROLAC TROMETHAMINE 15 MG: 15 INJECTION, SOLUTION INTRAMUSCULAR; INTRAVENOUS at 11:32

## 2018-07-05 ASSESSMENT — ACTIVITIES OF DAILY LIVING (ADL): PREVIOUS_RESPONSIBILITIES: MEDICATION MANAGEMENT;FINANCES;MEAL PREP

## 2018-07-05 ASSESSMENT — PAIN DESCRIPTION - DESCRIPTORS
DESCRIPTORS: ACHING;SORE

## 2018-07-05 NOTE — PLAN OF CARE
Problem: Patient Care Overview  Goal: Plan of Care/Patient Progress Review  RN:  1.Pt will remain hemodynamically stable.    2.Tropinin/EKG WNL.   PT 4E: Cancel- patient seen by OT, then transferring units this afternoon. Will reschedule PT evaluation.

## 2018-07-05 NOTE — PLAN OF CARE
Problem: OT General Care Plan  Goal: OT Frequency  Discharge Planner OT  4E  Patient plan for discharge: TCU  Current status: Pt now POD3 CABG, educated re sternal precautions although with cognitive impairment from prior TBI and pt noted to be slow processing compared to past edema sessions. Pt limited mobility due to weakness, slow processing and precautions.  Per RN D lift transfers to chair, pt unable to adjust self in chair without heavy assist.   Barriers to return to prior living situation: sternal precautions, weakness, cognitive decline  Recommendations for discharge: TCU  Rationale for recommendations: Pt will need rehab to increase strength and IND with ADLs within precautions.        Entered by: Yumiko Gutierrez 07/05/2018 12:12 PM

## 2018-07-05 NOTE — PROGRESS NOTES
CARDIAC ICU PROGRESS NOTE  07/04/18    CO-MORBIDITIES:   Acute chest pain   Hypokalemia  DM2, HTN, CKD3, COPD, fibromyalgia, anxiety, depression, lymphedema, CAD    ASSESSMENT: Mariel Ribeiro is a 73 year old female s/p 3v CABG (Left internal mammary artery to left anterior descending artery, Reversed saphenous vein graft to right posterior descending artery, Reversed saphenous vein graft to obtuse marginal artery. Extubated 7/3 to bipap. Initially lethargic improving with holding narcotics    CHANGES FOR TODAY:  - 1 U RBC this am  - Remove swan line  - Ok for toradol 15mg q 6hrs  - SLP eval for failed swallow study. Ok for meds with applesauce  - HIT Negative. Resume subQ heparin    PLAN:  Neuro/ pain/ sedation:  - Monitor neurological status. Notify the MD for any acute changes in exam.  - start Toradol 15mg q 6hrs  - Escitalopram, pregabalin, hold Trazadone  - PRN tramadol, PRN dilaudid for pain  - Hold sedative medications continued drowsiness  - Metoprolol 5mg IV q 6hrs. Ok to hold for low maps  - Possible transfer to floor tomorrow    Pulmonary care:   - extubated to bipap 10/5, 40%, now on nasal cannula  - Supplemental oxygen to keep saturation above 92 %.  - Incentive spirometer every 15- 30 minutes when awake and off bipap  - albuterol nebs    Cardiovascular:    # 3v CAB LIMA to LAD, SVG to OM, SVG to PDA  - ASA 325mg,   - Monitor hemodynamic status.   - Off pressors    GI care:   - NPO while on bipap  - Senokot    Fluids/ Electrolytes/ Nutrition:   - D5 1/2NS + 20K  for IV fluid hydration  - Lyte replacement per protocol  - No indication for parenteral nutrition.    Renal/ Fluid Balance:    - Urine output is adequate so far. Continue liriano  - Furosemide 40mg BID  - Will continue to monitor intake and output.    Endocrine:    - insulin gtt  - PTA levothyroxine    ID/ Antibiotics:  - Perioperative clindamycin    Heme:     #postop anemia  - Hemoglobin stable    MSK:  -PT/OT    Prophylaxis:    - Heparin  5000 TID  - SCDs    Lines/ tubes/ drains:  - CVC  - PIV x 2  - Art line  - CT x 3  - Gutierrez    Disposition:  - [facility] ICU. Possible TTF tomorrow    Patient seen, findings and plan discussed with CVTS team.    Jose Armando Sahni MD   Gen Surg PGY-3  P: [phone]      ====================================    SUBJECTIVE:   - Improving lethargy compared to yesterday. 1 U RBC given this am for hgb to 6.4. Thrombocytopenic today, HIT negative. Continuing to have pain while still being drowsy off narcotics.    OBJECTIVE:   1. VITAL SIGNS:   Temp:  [98.1  F (36.7  C)-101.1  F (38.4  C)] 98.1  F (36.7  C)  Heart Rate:  [] 98  Resp:  [13-30] 20  BP: (118)/(60) 118/60  MAP:  [69 mmHg-96 mmHg] 80 mmHg  Arterial Line BP: (105-145)/(43-66) 127/57  FiO2 (%):  [2 %-40 %] 30 %  SpO2:  [93 %-100 %] 96 %  Ventilation Mode: CPAP/PS  (Continuous positive airway pressure with Pressure Support)  FiO2 (%): 30 %  Rate Set (breaths/minute): 16 breaths/min  Tidal Volume Set (mL): 550 mL  PEEP (cm H2O): 5 cmH2O  Pressure Support (cm H2O): 7 cmH2O  Oxygen Concentration (%): 40 %  Resp: 20    2. INTAKE/ OUTPUT:   I/O last 3 completed shifts:  In: 1837.44 [P.O.:90; I.V.:1197.44]  Out: 2710 [Urine:2410; Chest Tube:300]    3. PHYSICAL EXAMINATION:   General: obese female  Neuro: A&O, NAD, resting quietly  Resp: Breathing non-labored on bipap. Surgical dressings in place. CTs with serosang output  CV: RRR  Abdomen: Soft, Non-distended, Non-tender  Incisions: Bandages in place  Extremities: warm and well perfused    4. INVESTIGATIONS:   Arterial Blood Gases     Recent Labs  Lab 07/04/18  1111 07/04/18  0359 07/03/18  1458 07/03/18  1145   PH 7.38 7.35 7.33* 7.31*   PCO2 44 47* 43 41   PO2 125* 177* 134* 144*   HCO3 26 26 22 21     Complete Blood Count     Recent Labs  Lab 07/04/18  1008 07/04/18  0400 07/03/18  1221 07/03/18  0358 07/02/18  1606   WBC  --  7.6 17.2* 16.7* 16.3*   HGB 7.7* 6.4* 7.6* 7.8* 9.4*   PLT  --  66* 124* 114* 100*     Basic Metabolic  Panel    Recent Labs  Lab 07/04/18  1601 07/04/18  0400 07/03/18  1221 07/03/18  0358   * 147* 144 145*   POTASSIUM 3.6 4.2 4.5 3.7   CHLORIDE 119* 113* 112* 114*   CO2 24 28 25 21   BUN 16 17 17 15   CR 1.25* 1.52* 1.56* 1.40*   * 142* 193* 177*     Liver Function Tests    Recent Labs  Lab 07/03/18  0637 07/02/18  1606 07/02/18  1436 07/01/18  0621 06/29/18  1713   AST  --   --   --  20 22   ALT  --   --   --  26 28   ALKPHOS  --   --   --  100 108   BILITOTAL  --   --   --  1.0 0.9   ALBUMIN  --   --   --  3.1* 3.4   INR 1.30* 1.40* 1.52*  --  1.01     Pancreatic Enzymes  No lab results found in last 7 days.  Coagulation Profile    Recent Labs  Lab 07/03/18  0637 07/02/18  1606 07/02/18  1436 06/29/18  1713   INR 1.30* 1.40* 1.52* 1.01   PTT  --  37 33  --      Lactate  Invalid input(s): LACTATE    5. RADIOLOGY:   CXR:  1. Interval removal of endotracheal tube other support devices are  stable.  2. Enlarged cardiomediastinal silhouette, low lung volumes and  retrocardiac opacities are unchanged.    =========================================

## 2018-07-05 NOTE — PLAN OF CARE
Problem: Patient Care Overview  Goal: Plan of Care/Patient Progress Review  RN:  1.Pt will remain hemodynamically stable.    2.Tropinin/EKG WNL.   OT/edema 4E: Pt continues with 3+ pitting edema in Ryan LEs. Pt with some weeping from harvest sites on LEs. 2/2 maceration precautions ace wrap used instead. Ace wrap used in quick wrap fashion from MTPs to knee creases. Please remove garment if it gets soiled or if pt c/o LE pain or numbness.

## 2018-07-05 NOTE — PLAN OF CARE
Problem: Patient Care Overview  Goal: Plan of Care/Patient Progress Review  RN:  1.Pt will remain hemodynamically stable.    2.Tropinin/EKG WNL.   Outcome: Improving  Transfer  Transferred to: 6C  Via:bed  Reason for transfer:Pt appropriate for  6C  Family: Aware of transfer  Belongings:Sent with pt  Chart:Sent with pt  Medications: Meds from bin sent with pt  Report called to:Odilia RN at 1530. Pt up at 1630.       Neuro:A&Ox4. Afebrile.Dilaudid, abdominal binder, tylenol PO, Toradol IVP, and neck after IJ pulled(assessed and C/D/I, ice applied).  CV: Sinus to Sinus Tach. IV Lopressor, 1x hydralazine given. 40 mg  Lasix IVP given.   Resp: On 2-5LPM NC, Fair, congested, non-productive cough. Crackles to diminished in bases. Bipap while sleeping. Using IS well. CT -10-30/hour with an increase when getting up to the chair. Team aware. Some SOB  around 1400-dilaudid IVP given and lasix, and neb given with a decreased SOB and decreased O2 needs from 2-5LPM.   GI/: BS+. BM-. Flatus +.NPO w/applesauce with meds, no liquids with pills. Ice chips-5/hour. Team aware of recommendations and discussed with nutrition. SLP to follow.   Skin:Intact except for incisions. Wound cleansers to incisions and primapore on legs-dsg marked, appears to be weeping through incisions. See FS. OT following for lymph wraps.   Other: BG in 100s- Algo #4+.  Turned q2hr w/+3 assist. +2 assist in bed. Up in chair x1 w/sling. Pressure dsg on art line site.     P: Continue to monitor. Contact CVTS with changes. Keep MAP greater than 70.

## 2018-07-05 NOTE — PLAN OF CARE
Problem: Patient Care Overview  Goal: Plan of Care/Patient Progress Review  RN:  1.Pt will remain hemodynamically stable.    2.Tropinin/EKG WNL.   Discharge Planner SLP   Patient plan for discharge: Did not discuss  Current status: Pt demonstrates improved alertness and ability to actively participate in evaluation. She continues to demonstrate overt clinical s/sx of aspiration/penetration during session. Recommend NPO with alternative methods of nutrition/hydration/medication. Pt may have single ice chip in moderation (<5/hour) for oral hydration. SLP to follow.   Barriers to return to prior living situation: Dysphagia  Recommendations for discharge: inpatient rehab  Rationale for recommendations: Anticipate ongoing needs after discharge.        Entered by: Opal Carvajal 07/05/2018 8:13 AM

## 2018-07-05 NOTE — PROGRESS NOTES
" 07/05/18 1100   Quick Adds   Type of Visit Occupational Therapy Re-evaluation   Living Environment   Lives With significant other   Living Arrangements house   Home Accessibility stairs within home;stairs to enter home;grab bars present (bathtub)   Number of Stairs to Enter Home 2   Number of Stairs Within Home 0   Living Environment Comment Pt has been living with roommate for 30 years, has a dog, roommate still works so pt will be home alone during day. Pt notes she has many friends that are available to help out as needed. Has outside help from a friend on Tu and Thurs for about 5 hours each day.   Self-Care   Dominant Hand right   Usual Activity Tolerance fair   Current Activity Tolerance poor   Regular Exercise yes   Activity/Exercise Type walking   Exercise Amount/Frequency daily   Equipment Currently Used at Home cane, straight;walker, rolling   Activity/Exercise/Self-Care Comment Reports roommate does most IADLs, pt does her own med set up and finances.  Does her own ADLs with occasional assist with dressing and showers.  Daily dog walks with 4ww about 1 block.    Functional Level Prior   Ambulation 1-->assistive equipment   Transferring 1-->assistive equipment   Toileting 0-->independent   Bathing 3-->assistive equipment and person   Dressing 1-->assistive equipment   Eating 0-->independent   Communication 0-->understands/communicates without difficulty   Cognition 1 - attention or memory deficits   Fall history within last six months yes   Number of times patient has fallen within last six months 6   Which of the above functional risks had a recent onset or change? ambulation;transferring;toileting;bathing;dressing;cognition;fall history   Prior Functional Level Comment \"I fall and then I get up\".  Reports ~50+ falls in the last   (pt did not finish sentence even with cues)    General Information   Onset of Illness/Injury or Date of Surgery - Date 06/29/18  (7/4 to OR for CABG)   Referring Physician " Kb Jeffries PA-C   Patient/Family Goals Statement I plan to go to TCU for 2 weeks then home with roommate and dog   Additional Occupational Profile Info/Pertinent History of Current Problem 73-year-old female with DM2, HTN, CKD3, HLD, COPD, fibromyalgia, anxiety, depression, lymphedema, HFpEF 2/2 ischemic cardiomyopathy, CAD s/p stent now POD #3 s/p 3v CABG.   Precautions/Limitations sternal precautions;fall precautions   Heart Disease Risk Factors Lack of physical activity;Overweight;Medical history;Age   General Observations Pt up in chair via lift with nursing.  Pt slow to respond to questions often trailing off mid sentence.     General Info Comments activity: up in chair within 8 hours of extubation   Cognitive Status Examination   Orientation orientation to person, place and time   Level of Consciousness confused;alert   Able to Follow Commands mild impairment   Personal Safety (Cognitive) mild impairment   Memory impaired   Attention Distractible during evaluation   Cognitive Comment Pt reports history of 5 concussions. Most recent in 2016 resulted in memory and vision loss.  Reports ongoing cognitive decline due to TBI's.  Pt with improved alertness from yesterday although per edema therapist pt is slow to repond from baseline.    Visual Perception   Visual Perception Wears glasses   Visual Perception Comments baseline   Sensory Examination   Sensory Comments increased LE edema leading to LE discomfort and sensory changes   Pain Assessment   Patient Currently in Pain Yes, see Vital Sign flowsheet   Integumentary/Edema   Integumentary/Edema other (describe)   Integumentary/Edema Comments LE edema, being addressed by edema.    Posture   Posture Comments pt able to sit upright supported in chair   Range of Motion (ROM)   ROM Comment limited UE ROM L shouder to 90 degrees 2/2 precautions and pain from old sx.  WFL fingers, hands wrists and elbows.  L wrist not assessed due to art line.    Strength    Strength Comments NT   Hand Strength   Hand Strength Comments fair   Coordination   Fine Motor Coordination deficits noted with FMC   Coordination Comments below baseline, somewhat slow to respond   Mobility   Bed Mobility Comments per RN Max A to D x2   Transfer Skills   Transfer Comments per RN using Lift to transfer   Toilet Transfer   Toilet Transfer Comments D using cathether and bed pan   Lower Body Dressing   Level of [place]: Dress Lower Body maximum assist (25% patients effort)   Toileting   Level of [place]: Toilet dependent (less than 25% patients effort)   Grooming   Level of [place]: Grooming minimum assist (75% patients effort)   Physical Assist/Nonphysical Assist: Grooming set-up required;verbal cues   Instrumental Activities of Daily Living (IADL)   Previous Responsibilities medication management;finances;meal prep  (has assist from roommate)   IADL Comments roommate helps with all   Activities of Daily Living Analysis   Impairments Contributing to Impaired Activities of Daily Living balance impaired;cognition impaired;coordination impaired;pain;post surgical precautions;strength decreased;ROM decreased   General Therapy Interventions   Planned Therapy Interventions ADL retraining;IADL retraining;balance training;strengthening;ROM;cognition;fine motor coordination training   Clinical Impression   Criteria for Skilled Therapeutic Interventions Met yes, treatment indicated   OT Diagnosis post op CABG   Influenced by the following impairments post op precautions, pain and weakness, cognitive decline   Assessment of Occupational Performance 5 or more Performance Deficits   Identified Performance Deficits all ADLs and IADLs are affected at this time   Clinical Decision Making (Complexity) Moderate complexity   Therapy Frequency daily   Predicted Duration of Therapy Intervention (days/wks) 2 weeks   Anticipated Discharge Disposition Transitional Care Facility   Risks and Benefits of  "Treatment have been explained. Yes   Patient, Family & other staff in agreement with plan of care Yes   Clinical Impression Comments TCU followed by OP [facility]   [facility] AM-PAC TM \"6 Clicks\"   2016, Trustees of [facility], under license to [employer].  All rights reserved.   6 Clicks Short Forms Daily Activity Inpatient Short Form   [facility] AM-PAC  \"6 Clicks\" Daily Activity Inpatient Short Form   1. Putting on and taking off regular lower body clothing? 2 - A Lot   2. Bathing (including washing, rinsing, drying)? 2 - A Lot   3. Toileting, which includes using toilet, bedpan or urinal? 1 - Total   4. Putting on and taking off regular upper body clothing? 2 - A Lot   5. Taking care of personal grooming such as brushing teeth? 4 - None   6. Eating meals? 4 - None   Daily Activity Raw Score (Score out of 24.Lower scores equate to lower levels of function) 15   Total Evaluation Time   Total Evaluation Time (Minutes) 15     "

## 2018-07-05 NOTE — PLAN OF CARE
Problem: Patient Care Overview  Goal: Plan of Care/Patient Progress Review  RN:  1.Pt will remain hemodynamically stable.    2.Tropinin/EKG WNL.   Outcome: No Change  Pt remains hemodynamically stable this shift. On bipap most of NOC, but does tolerate 2L/NC when off bipap. Remains confused at times, but able to make needs known. Rested well over NOC. Repositioned Q2 hours. Continuing to monitor.    Problem: Cardiac: ACS (Acute Coronary Syndrome) (Adult)  Goal: Signs and Symptoms of Listed Potential Problems Will be Absent, Minimized or Managed (Cardiac: ACS)  Signs and symptoms of listed potential problems will be absent, minimized or managed by discharge/transition of care (reference Cardiac: ACS (Acute Coronary Syndrome) (Adult) C[facility]).    07/04/18 2000   Cardiac: ACS (Acute Coronary Syndrome)   Problems Assessed (Acute Coronary Syndrome) all   Problems Present (Acute Coronary Syn) situational response       Problem: Diabetes Comorbidity  Goal: Diabetes  Patient comorbidity will be monitored for signs and symptoms of hyperglycemia or hypoglycemia. Problems will be absent, minimized or managed by discharge/transition of care.   Outcome: No Change  Remains on IV insulin gtt. Continuing to monitor.     Problem: Cardiac Disease Comorbidity  Goal: Cardiac Disease  Patient comorbidity will be monitored for signs and symptoms of Cardiac Disease.  Problems will be absent, minimized or managed by discharge/transition of care.   Outcome: No Change  No hemodynamic changes this shift. Continuing to monitor.    Problem: Cardiac Surgery (Adult)  Goal: Signs and Symptoms of Listed Potential Problems Will be Absent, Minimized or Managed (Cardiac Surgery)  Signs and symptoms of listed potential problems will be absent, minimized or managed by discharge/transition of care (reference Cardiac Surgery (Adult) CPG).    07/04/18 2000   Cardiac Surgery   Problems Assessed (Cardiac Surgery) all   Problems Present (Cardiac Surgery) bowel  motility decreased;pain;situational response

## 2018-07-05 NOTE — PROGRESS NOTES
"CLINICAL NUTRITION SERVICES - ASSESSMENT NOTE     Nutrition Prescription    RECOMMENDATIONS FOR MDs/PROVIDERS TO ORDER:  Diet adv vs FT placement and TF initiation within 24-48 hrs     Malnutrition Status:    Unable to determine due to lack of nutrition history and physical assessment     Recommendations already ordered by Registered Dietitian (RD):  None at this time     Future/Additional Recommendations:  Pending swallow status, if TF becomes nutrition plan of care, would recommend the following potential regimens pending fluid/renal status.   --Impact Peptide @ goal 40 ml/hr (960 ml/day) to provide 1440 kcals (24 kcal/kg/day), 90 g PRO (1.5 g/kg/day), 739 ml free H2O, 61 g Fat (50% from MCTs), 134 g CHO and no Fiber daily.  --vs--  --Peptamen VHP @ 60 ml/hr (1440 ml/day) to provide 1440 kcal (24 kcal/kg), 133 g PRO (2.2 g PRO/kg), 1209 ml free H2O, 109 g CHO, and no fiber daily.        REASON FOR ASSESSMENT  Mariel Ribeiro is a 73 year old female assessed by the dietitian for NPO/Clear Liquids    NUTRITION HISTORY  Unable to obtain nutrition history as pt busy with RN cares during attempts to assess.      CURRENT NUTRITION ORDERS  Diet: NPO x 3 days  (during intubation, now extubated with unsafe swallow per SLP 7/4 & 7/5)   --Pt previously on Low Saturated Fat, Na < 2400 mg diet (6/29 - 7/2) and documented to be eating 100% meals per nursing.     LABS   Na 147   Phos 2.4    MEDICATIONS   Niacin BID with meals   Bowel regimen ordered     ANTHROPOMETRICS  Height: 167.6 cm (5' 6\")  Most Recent Weight: 143 kg (315 lb 4.1 oz)    IBW: 59.1 kg  BMI: Obesity Grade III BMI >40  Weight History:    Wt Readings from Last 10 Encounters:   07/05/18 143 kg (315 lb 4.1 oz)   06/26/18 137 kg (302 lb)   06/19/18 139 kg (306 lb 6.4 oz)   06/13/18 137 kg (302 lb)   05/22/18 138.3 kg (305 lb)   04/30/18 138.3 kg (305 lb)   03/26/18 138.3 kg (305 lb)   03/19/18 (!) 138.3 kg (305 lb)   11/15/17 135.2 kg (298 lb)   11/07/17 134.3 kg " (296 lb 1.2 oz)     Dosing Weight: 59 kg  (IBW given BMI > 50)     ASSESSED NUTRITION NEEDS  Estimated Energy Needs: 1298 - 1475+ kcals/day (22 - 25+ kcals/kg)    Justification: Obese  Estimated Protein Needs: 71 - 89 grams protein/day (1.2 - 1.5+ grams of pro/kg)    Justification: Increased needs pending renal/CKD status   Estimated Fluid Needs: 1 mL/kcal  Justification: Maintenance    PHYSICAL FINDINGS  See malnutrition section below.    MALNUTRITION  % Intake: Decreased intake since admission does not meet criteria; unable to obtain nutrition history   % Weight Loss: None noted  Subcutaneous Fat Loss: Unable to assess  Muscle Loss: Unable to assess  Fluid Accumulation/Edema: Moderate anasarca per chart review  Malnutrition Diagnosis: Unable to determine due to lack of nutrition history and physical assessment     NUTRITION DIAGNOSIS  Inadequate protein-energy intake related to NPO status 2/2 unsafe swallow as evidenced by no PO intake x past 3 days.       INTERVENTIONS  Implementation  Nutrition Education - Unable to complete due to pt busy with RN during attempts    Enteral Nutrition - Recommendations      Goals  Diet adv v nutrition support within 2-3 days.     Monitoring/Evaluation  Progress toward goals will be monitored and evaluated per protocol.    Luh Walsh RD, LD  [facility]ICU Pager: [phone]

## 2018-07-05 NOTE — PLAN OF CARE
Problem: Patient Care Overview  Goal: Plan of Care/Patient Progress Review  RN:  1.Pt will remain hemodynamically stable.    2.Tropinin/EKG WNL.   Outcome: No Change    D/I/A:    Neuro: Still lethargic, speech less garbled, but occasionally illogical. Neuros otherwise intact.Disoriented to time and situation, more interactive in PM. C/o incisional pain in AM-0.3 dilaudid given with little relief. Tordal started IV w/some relief. Abdominal binder on.  CV: Sinus to Sinus Tach. Lopressor IV help at 1800 due to softer pressures. Given albumin 250 for MAPs less than 70 at 1830. Olds pulled at 1400. RIJ and art line in place.   Resp: On 2LPM NC, Fair, congested, non-productive cough. Crackles to diminished in bases. Bipap while sleeping.   GI/: BS+. BM-. Flatus +.NPO w/applesauce with meds, no liquids with pills. SLP to follow.   Skin:Intact except for incisions. Wound cleansersPrimapore on legs-dsg marked. See FS.,   Other: BG in 100s- Algo #4+.  Turned q2hr w/+3 assist. Up in chair x1 w/sling. Hgb recheck at 7.7 after 1 PRBC given in AM. HIT -.    P: Continue to monitor. Contact CVTS with changes. Keep MAP greater than 70.

## 2018-07-05 NOTE — PROGRESS NOTES
TRANSFER  Transferred from:   Via: Bed  Reason for transfer: Patient appropriate for 6C - improved patient condition  Family: Aware of transfer. At bedside  Belongings: Sent with patient  Chart: Sent with patient  Medications: Meds from bin sent with patient  Report received from: Viky

## 2018-07-06 ENCOUNTER — APPOINTMENT (OUTPATIENT)
Dept: OCCUPATIONAL THERAPY | Facility: CLINIC | Age: 73
DRG: 234 | End: 2018-07-06
Payer: MEDICARE

## 2018-07-06 ENCOUNTER — APPOINTMENT (OUTPATIENT)
Dept: PHYSICAL THERAPY | Facility: CLINIC | Age: 73
DRG: 234 | End: 2018-07-06
Attending: PHYSICIAN ASSISTANT
Payer: MEDICARE

## 2018-07-06 ENCOUNTER — APPOINTMENT (OUTPATIENT)
Dept: SPEECH THERAPY | Facility: CLINIC | Age: 73
DRG: 234 | End: 2018-07-06
Payer: MEDICARE

## 2018-07-06 ENCOUNTER — APPOINTMENT (OUTPATIENT)
Dept: GENERAL RADIOLOGY | Facility: CLINIC | Age: 73
DRG: 234 | End: 2018-07-06
Attending: STUDENT IN AN ORGANIZED HEALTH CARE EDUCATION/TRAINING PROGRAM
Payer: MEDICARE

## 2018-07-06 ENCOUNTER — APPOINTMENT (OUTPATIENT)
Dept: GENERAL RADIOLOGY | Facility: CLINIC | Age: 73
DRG: 234 | End: 2018-07-06
Attending: NURSE PRACTITIONER
Payer: MEDICARE

## 2018-07-06 LAB
ANION GAP SERPL CALCULATED.3IONS-SCNC: 6 MMOL/L (ref 3–14)
BUN SERPL-MCNC: 22 MG/DL (ref 7–30)
CALCIUM SERPL-MCNC: 8.7 MG/DL (ref 8.5–10.1)
CHLORIDE SERPL-SCNC: 115 MMOL/L (ref 94–109)
CO2 SERPL-SCNC: 24 MMOL/L (ref 20–32)
CREAT SERPL-MCNC: 1.38 MG/DL (ref 0.52–1.04)
ERYTHROCYTE [DISTWIDTH] IN BLOOD BY AUTOMATED COUNT: 16 % (ref 10–15)
GFR SERPL CREATININE-BSD FRML MDRD: 38 ML/MIN/1.7M2
GLUCOSE BLDC GLUCOMTR-MCNC: 112 MG/DL (ref 70–99)
GLUCOSE BLDC GLUCOMTR-MCNC: 119 MG/DL (ref 70–99)
GLUCOSE BLDC GLUCOMTR-MCNC: 128 MG/DL (ref 70–99)
GLUCOSE BLDC GLUCOMTR-MCNC: 130 MG/DL (ref 70–99)
GLUCOSE BLDC GLUCOMTR-MCNC: 132 MG/DL (ref 70–99)
GLUCOSE BLDC GLUCOMTR-MCNC: 133 MG/DL (ref 70–99)
GLUCOSE BLDC GLUCOMTR-MCNC: 137 MG/DL (ref 70–99)
GLUCOSE BLDC GLUCOMTR-MCNC: 143 MG/DL (ref 70–99)
GLUCOSE BLDC GLUCOMTR-MCNC: 144 MG/DL (ref 70–99)
GLUCOSE BLDC GLUCOMTR-MCNC: 150 MG/DL (ref 70–99)
GLUCOSE BLDC GLUCOMTR-MCNC: 167 MG/DL (ref 70–99)
GLUCOSE BLDC GLUCOMTR-MCNC: 180 MG/DL (ref 70–99)
GLUCOSE BLDC GLUCOMTR-MCNC: 200 MG/DL (ref 70–99)
GLUCOSE BLDC GLUCOMTR-MCNC: 224 MG/DL (ref 70–99)
GLUCOSE BLDC GLUCOMTR-MCNC: 236 MG/DL (ref 70–99)
GLUCOSE BLDC GLUCOMTR-MCNC: 248 MG/DL (ref 70–99)
GLUCOSE BLDC GLUCOMTR-MCNC: 262 MG/DL (ref 70–99)
GLUCOSE BLDC GLUCOMTR-MCNC: 273 MG/DL (ref 70–99)
GLUCOSE SERPL-MCNC: 128 MG/DL (ref 70–99)
HCT VFR BLD AUTO: 25.4 % (ref 35–47)
HGB BLD-MCNC: 7.8 G/DL (ref 11.7–15.7)
INR PPP: 1.09 (ref 0.86–1.14)
MAGNESIUM SERPL-MCNC: 1.9 MG/DL (ref 1.6–2.3)
MCH RBC QN AUTO: 28.1 PG (ref 26.5–33)
MCHC RBC AUTO-ENTMCNC: 30.7 G/DL (ref 31.5–36.5)
MCV RBC AUTO: 91 FL (ref 78–100)
PLATELET # BLD AUTO: 89 10E9/L (ref 150–450)
POTASSIUM SERPL-SCNC: 4.3 MMOL/L (ref 3.4–5.3)
RBC # BLD AUTO: 2.78 10E12/L (ref 3.8–5.2)
SODIUM SERPL-SCNC: 145 MMOL/L (ref 133–144)
WBC # BLD AUTO: 8.5 10E9/L (ref 4–11)

## 2018-07-06 PROCEDURE — 97535 SELF CARE MNGMENT TRAINING: CPT | Mod: GO | Performed by: OCCUPATIONAL THERAPIST

## 2018-07-06 PROCEDURE — 36415 COLL VENOUS BLD VENIPUNCTURE: CPT | Performed by: NURSE PRACTITIONER

## 2018-07-06 PROCEDURE — 25000132 ZZH RX MED GY IP 250 OP 250 PS 637: Mod: GY | Performed by: STUDENT IN AN ORGANIZED HEALTH CARE EDUCATION/TRAINING PROGRAM

## 2018-07-06 PROCEDURE — 25800025 ZZH RX 258: Performed by: STUDENT IN AN ORGANIZED HEALTH CARE EDUCATION/TRAINING PROGRAM

## 2018-07-06 PROCEDURE — A9270 NON-COVERED ITEM OR SERVICE: HCPCS | Mod: GY | Performed by: ANESTHESIOLOGY

## 2018-07-06 PROCEDURE — 25000125 ZZHC RX 250: Performed by: STUDENT IN AN ORGANIZED HEALTH CARE EDUCATION/TRAINING PROGRAM

## 2018-07-06 PROCEDURE — 94660 CPAP INITIATION&MGMT: CPT

## 2018-07-06 PROCEDURE — 25000132 ZZH RX MED GY IP 250 OP 250 PS 637: Mod: GY | Performed by: ANESTHESIOLOGY

## 2018-07-06 PROCEDURE — 25000132 ZZH RX MED GY IP 250 OP 250 PS 637: Mod: GY | Performed by: PHYSICIAN ASSISTANT

## 2018-07-06 PROCEDURE — 40000556 ZZH STATISTIC PERIPHERAL IV START W US GUIDANCE

## 2018-07-06 PROCEDURE — 25000125 ZZHC RX 250: Performed by: THORACIC SURGERY (CARDIOTHORACIC VASCULAR SURGERY)

## 2018-07-06 PROCEDURE — A9270 NON-COVERED ITEM OR SERVICE: HCPCS | Mod: GY | Performed by: STUDENT IN AN ORGANIZED HEALTH CARE EDUCATION/TRAINING PROGRAM

## 2018-07-06 PROCEDURE — 25000128 H RX IP 250 OP 636: Performed by: NURSE PRACTITIONER

## 2018-07-06 PROCEDURE — 40000986 XR CHEST PORT 1 VW

## 2018-07-06 PROCEDURE — 25800025 ZZH RX 258: Performed by: NURSE PRACTITIONER

## 2018-07-06 PROCEDURE — 80048 BASIC METABOLIC PNL TOTAL CA: CPT | Performed by: NURSE PRACTITIONER

## 2018-07-06 PROCEDURE — 25000125 ZZHC RX 250: Performed by: PHYSICIAN ASSISTANT

## 2018-07-06 PROCEDURE — A9270 NON-COVERED ITEM OR SERVICE: HCPCS | Mod: GY | Performed by: THORACIC SURGERY (CARDIOTHORACIC VASCULAR SURGERY)

## 2018-07-06 PROCEDURE — 40000133 ZZH STATISTIC OT WARD VISIT: Performed by: OCCUPATIONAL THERAPIST

## 2018-07-06 PROCEDURE — 97530 THERAPEUTIC ACTIVITIES: CPT | Mod: GP

## 2018-07-06 PROCEDURE — A9270 NON-COVERED ITEM OR SERVICE: HCPCS | Mod: GY | Performed by: NURSE PRACTITIONER

## 2018-07-06 PROCEDURE — 85027 COMPLETE CBC AUTOMATED: CPT | Performed by: NURSE PRACTITIONER

## 2018-07-06 PROCEDURE — 40000225 ZZH STATISTIC SLP WARD VISIT: Performed by: SPEECH-LANGUAGE PATHOLOGIST

## 2018-07-06 PROCEDURE — 25000128 H RX IP 250 OP 636: Performed by: STUDENT IN AN ORGANIZED HEALTH CARE EDUCATION/TRAINING PROGRAM

## 2018-07-06 PROCEDURE — 00000146 ZZHCL STATISTIC GLUCOSE BY METER IP

## 2018-07-06 PROCEDURE — 21400006 ZZH R&B CCU INTERMEDIATE UMMC

## 2018-07-06 PROCEDURE — 94640 AIRWAY INHALATION TREATMENT: CPT | Mod: 76

## 2018-07-06 PROCEDURE — 83735 ASSAY OF MAGNESIUM: CPT | Performed by: NURSE PRACTITIONER

## 2018-07-06 PROCEDURE — 25000132 ZZH RX MED GY IP 250 OP 250 PS 637: Mod: GY | Performed by: NURSE PRACTITIONER

## 2018-07-06 PROCEDURE — 25000132 ZZH RX MED GY IP 250 OP 250 PS 637: Mod: GY | Performed by: THORACIC SURGERY (CARDIOTHORACIC VASCULAR SURGERY)

## 2018-07-06 PROCEDURE — 85610 PROTHROMBIN TIME: CPT | Performed by: NURSE PRACTITIONER

## 2018-07-06 PROCEDURE — 94640 AIRWAY INHALATION TREATMENT: CPT

## 2018-07-06 PROCEDURE — 40000275 ZZH STATISTIC RCP TIME EA 10 MIN

## 2018-07-06 PROCEDURE — 97116 GAIT TRAINING THERAPY: CPT | Mod: GP

## 2018-07-06 PROCEDURE — 71045 X-RAY EXAM CHEST 1 VIEW: CPT

## 2018-07-06 PROCEDURE — 92526 ORAL FUNCTION THERAPY: CPT | Mod: GN | Performed by: SPEECH-LANGUAGE PATHOLOGIST

## 2018-07-06 PROCEDURE — 97162 PT EVAL MOD COMPLEX 30 MIN: CPT | Mod: GP

## 2018-07-06 PROCEDURE — 25000131 ZZH RX MED GY IP 250 OP 636 PS 637: Mod: GY | Performed by: NURSE PRACTITIONER

## 2018-07-06 PROCEDURE — 40000193 ZZH STATISTIC PT WARD VISIT

## 2018-07-06 RX ORDER — ASPIRIN 325 MG
325 TABLET ORAL DAILY
Status: DISCONTINUED | OUTPATIENT
Start: 2018-07-06 | End: 2018-07-06

## 2018-07-06 RX ORDER — AMLODIPINE BESYLATE 5 MG/1
5 TABLET ORAL DAILY
Status: DISCONTINUED | OUTPATIENT
Start: 2018-07-07 | End: 2018-07-07

## 2018-07-06 RX ORDER — LIDOCAINE 4 G/G
1 PATCH TOPICAL
Status: DISCONTINUED | OUTPATIENT
Start: 2018-07-06 | End: 2018-07-13 | Stop reason: HOSPADM

## 2018-07-06 RX ORDER — METOPROLOL TARTRATE 25 MG/1
25 TABLET, FILM COATED ORAL 2 TIMES DAILY
Status: DISCONTINUED | OUTPATIENT
Start: 2018-07-06 | End: 2018-07-09

## 2018-07-06 RX ORDER — ASPIRIN 325 MG
325 TABLET ORAL DAILY
Status: DISCONTINUED | OUTPATIENT
Start: 2018-07-07 | End: 2018-07-13 | Stop reason: HOSPADM

## 2018-07-06 RX ORDER — PREGABALIN 100 MG/1
100 CAPSULE ORAL 2 TIMES DAILY
Status: DISCONTINUED | OUTPATIENT
Start: 2018-07-06 | End: 2018-07-13 | Stop reason: HOSPADM

## 2018-07-06 RX ORDER — ACETAMINOPHEN 325 MG/1
975 TABLET ORAL EVERY 8 HOURS
Status: DISCONTINUED | OUTPATIENT
Start: 2018-07-06 | End: 2018-07-13 | Stop reason: HOSPADM

## 2018-07-06 RX ORDER — POLYETHYLENE GLYCOL 3350 17 G/17G
17 POWDER, FOR SOLUTION ORAL DAILY
Status: DISCONTINUED | OUTPATIENT
Start: 2018-07-07 | End: 2018-07-13 | Stop reason: HOSPADM

## 2018-07-06 RX ORDER — ACETAMINOPHEN 325 MG/1
975 TABLET ORAL EVERY 8 HOURS PRN
Status: DISCONTINUED | OUTPATIENT
Start: 2018-07-06 | End: 2018-07-06

## 2018-07-06 RX ORDER — ALBUTEROL SULFATE 0.83 MG/ML
2.5 SOLUTION RESPIRATORY (INHALATION)
Status: DISCONTINUED | OUTPATIENT
Start: 2018-07-06 | End: 2018-07-07

## 2018-07-06 RX ORDER — FUROSEMIDE 10 MG/ML
40 INJECTION INTRAMUSCULAR; INTRAVENOUS 2 TIMES DAILY
Status: COMPLETED | OUTPATIENT
Start: 2018-07-06 | End: 2018-07-07

## 2018-07-06 RX ORDER — HEPARIN SODIUM 5000 [USP'U]/.5ML
5000 INJECTION, SOLUTION INTRAVENOUS; SUBCUTANEOUS EVERY 8 HOURS
Status: DISCONTINUED | OUTPATIENT
Start: 2018-07-06 | End: 2018-07-13 | Stop reason: HOSPADM

## 2018-07-06 RX ORDER — POTASSIUM CHLORIDE 750 MG/1
40 TABLET, EXTENDED RELEASE ORAL 2 TIMES DAILY
Status: COMPLETED | OUTPATIENT
Start: 2018-07-06 | End: 2018-07-07

## 2018-07-06 RX ORDER — ACETAMINOPHEN 325 MG/1
650 TABLET ORAL EVERY 4 HOURS PRN
Status: DISCONTINUED | OUTPATIENT
Start: 2018-07-06 | End: 2018-07-13 | Stop reason: HOSPADM

## 2018-07-06 RX ADMIN — SENNOSIDES AND DOCUSATE SODIUM 2 TABLET: 8.6; 5 TABLET ORAL at 21:52

## 2018-07-06 RX ADMIN — ACETAMINOPHEN 975 MG: 325 TABLET, FILM COATED ORAL at 21:31

## 2018-07-06 RX ADMIN — HUMAN INSULIN 6 UNITS/HR: 100 INJECTION, SOLUTION SUBCUTANEOUS at 02:38

## 2018-07-06 RX ADMIN — HEPARIN SODIUM 5000 UNITS: 5000 INJECTION, SOLUTION INTRAVENOUS; SUBCUTANEOUS at 22:04

## 2018-07-06 RX ADMIN — SENNOSIDES AND DOCUSATE SODIUM 2 TABLET: 8.6; 5 TABLET ORAL at 10:28

## 2018-07-06 RX ADMIN — INSULIN GLARGINE 30 UNITS: 100 INJECTION, SOLUTION SUBCUTANEOUS at 20:08

## 2018-07-06 RX ADMIN — POTASSIUM CHLORIDE 40 MEQ: 750 TABLET, EXTENDED RELEASE ORAL at 21:34

## 2018-07-06 RX ADMIN — KETOROLAC TROMETHAMINE 15 MG: 15 INJECTION, SOLUTION INTRAMUSCULAR; INTRAVENOUS at 02:52

## 2018-07-06 RX ADMIN — ALBUTEROL SULFATE 2.5 MG: 2.5 SOLUTION RESPIRATORY (INHALATION) at 11:55

## 2018-07-06 RX ADMIN — ALBUTEROL SULFATE 2.5 MG: 2.5 SOLUTION RESPIRATORY (INHALATION) at 21:24

## 2018-07-06 RX ADMIN — Medication 12.5 MG: at 10:27

## 2018-07-06 RX ADMIN — ACETAMINOPHEN 975 MG: 325 TABLET, FILM COATED ORAL at 13:08

## 2018-07-06 RX ADMIN — METOROPROLOL TARTRATE 5 MG: 5 INJECTION, SOLUTION INTRAVENOUS at 01:46

## 2018-07-06 RX ADMIN — PREGABALIN 100 MG: 100 CAPSULE ORAL at 21:35

## 2018-07-06 RX ADMIN — ALBUTEROL SULFATE 2.5 MG: 2.5 SOLUTION RESPIRATORY (INHALATION) at 16:00

## 2018-07-06 RX ADMIN — ESCITALOPRAM 20 MG: 20 TABLET, FILM COATED ORAL at 10:54

## 2018-07-06 RX ADMIN — PREGABALIN 100 MG: 100 CAPSULE ORAL at 11:21

## 2018-07-06 RX ADMIN — ALBUTEROL SULFATE 2 PUFF: 90 AEROSOL, METERED RESPIRATORY (INHALATION) at 06:10

## 2018-07-06 RX ADMIN — HEPARIN SODIUM 5000 UNITS: 5000 INJECTION, SOLUTION INTRAVENOUS; SUBCUTANEOUS at 14:02

## 2018-07-06 RX ADMIN — KETOROLAC TROMETHAMINE 15 MG: 15 INJECTION, SOLUTION INTRAMUSCULAR; INTRAVENOUS at 05:46

## 2018-07-06 RX ADMIN — LIDOCAINE 1 PATCH: 560 PATCH PERCUTANEOUS; TOPICAL; TRANSDERMAL at 21:56

## 2018-07-06 RX ADMIN — LEVOTHYROXINE SODIUM 150 MCG: 150 TABLET ORAL at 10:24

## 2018-07-06 RX ADMIN — FEBUXOSTAT 40 MG: 40 TABLET ORAL at 21:37

## 2018-07-06 RX ADMIN — POTASSIUM CHLORIDE, DEXTROSE MONOHYDRATE AND SODIUM CHLORIDE: 150; 5; 450 INJECTION, SOLUTION INTRAVENOUS at 06:23

## 2018-07-06 RX ADMIN — PANTOPRAZOLE SODIUM 40 MG: 40 INJECTION, POWDER, FOR SOLUTION INTRAVENOUS at 10:28

## 2018-07-06 RX ADMIN — POTASSIUM CHLORIDE 40 MEQ: 750 TABLET, EXTENDED RELEASE ORAL at 13:50

## 2018-07-06 RX ADMIN — ALBUTEROL SULFATE 2.5 MG: 2.5 SOLUTION RESPIRATORY (INHALATION) at 07:43

## 2018-07-06 RX ADMIN — GUAIFENESIN 200 MG: 100 SOLUTION ORAL at 21:53

## 2018-07-06 RX ADMIN — Medication 3 MG: at 22:03

## 2018-07-06 RX ADMIN — Medication 500 MG: at 10:56

## 2018-07-06 RX ADMIN — Medication 500 MG: at 17:31

## 2018-07-06 RX ADMIN — METOPROLOL TARTRATE 25 MG: 25 TABLET, FILM COATED ORAL at 21:51

## 2018-07-06 RX ADMIN — FUROSEMIDE 40 MG: 10 INJECTION, SOLUTION INTRAVENOUS at 10:29

## 2018-07-06 RX ADMIN — ATORVASTATIN CALCIUM 40 MG: 40 TABLET, FILM COATED ORAL at 21:37

## 2018-07-06 RX ADMIN — FUROSEMIDE 40 MG: 10 INJECTION, SOLUTION INTRAVENOUS at 16:36

## 2018-07-06 NOTE — PROGRESS NOTES
CLINICAL NUTRITION SERVICES     Nutrition Prescription    RECOMMENDATIONS FOR MDs/PROVIDERS TO ORDER:  Order kcal counts if eating less than 50% of meals.    Recommendations already ordered by Registered Dietitian (RD):  Oral supplements    Future/Additional Recommendations:  For all recommendations, see prior nutrition notes. Note, Endo is now following.     Diet: Advanced to a nectar-thick liquid diet today (7/6). SLP is following.     INTERVENTIONS:  Implementation:  Medical food supplement therapy: Placed order to send Gelatein 20 at 10:00 and HS (already thickened) and Boost Glucose Control (thickened) at 14:00.     Follow up/Monitoring:  Will continue to follow pt.    Sherrie Bonner, MS, RD, LD, [facility]   6C Pgr: [phone]

## 2018-07-06 NOTE — PROGRESS NOTES
CARDIOTHORACIC SURGERY PROGRESS NOTE  07/06/2018      SUBJECTIVE:    No acute issues over night. Patient seen resting comfortably in chair.   Patient reports sleeping well and pain is controlled.   Otherwise no acute complaints  Patient denies SOB, CP, fever, chills, sweats.     Patient being followed by speech, transitioned to a full liquid diet with nectar thin liquids.  - BM. + flatus.  Up with assist x 2.  NSR.       OBJECTIVE:   Temp:  [97.7  F (36.5  C)-98.6  F (37  C)] 97.7  F (36.5  C)  Heart Rate:  [] 87  Resp:  [14-25] 14  BP: (125-143)/(55-73) 125/55  MAP:  [80 mmHg-97 mmHg] 80 mmHg  Arterial Line BP: (126-168)/(56-67) 130/56  FiO2 (%):  [30 %] 30 %  SpO2:  [96 %-99 %] 98 %    MAPs: 85-89  Gen: A&Ox3, NAD  CV: RRR, normal S1, S2, no murmurs, rubs or gallops   *TPW removed without incident  Pulm: CTA, no wheezing or rhonchi  Abd: Soft, NT, ND, +BS  Ext: moderate LE edema, +1 pitting   Neuro: Nonfocal  Incision: c/d/i, no erythema, sternum stable  Tubes/drains: Dressing clean and dry, meds x 2 and Left pleural with serosanguinous output, no air leak. Total output 390 mL in 24 hr.  *meds removed without incident     I&O's:  I/O last 3 completed shifts:  In: 1847.28 [I.V.:1847.28]  Out: 1720 [Urine:1440; Chest Tube:280]    Labs:   BMP    Recent Labs  Lab 07/06/18  0945 07/05/18  0342 07/04/18  1601 07/04/18  0400   * 147* 149* 147*   POTASSIUM 4.3 4.2 3.6 4.2   CHLORIDE 115* 115* 119* 113*   ANTOINETTE 8.7 8.2* 7.4* 8.1*   CO2 24 27 24 28   BUN 22 19 16 17   CR 1.38* 1.44* 1.25* 1.52*   * 134* 107* 142*     CBC    Recent Labs  Lab 07/06/18  0945 07/05/18  0342 07/04/18  1008 07/04/18  0400 07/03/18  1221   WBC 8.5 7.4  --  7.6 17.2*   RBC 2.78* 2.52*  --  2.34* 2.79*   HGB 7.8* 7.1* 7.7* 6.4* 7.6*   HCT 25.4* 22.5*  --  20.0* 23.4*   MCV 91 89  --  86 84   MCH 28.1 28.2  --  27.4 27.2   MCHC 30.7* 31.6  --  32.0 32.5   RDW 16.0* 16.3*  --  16.8* 16.4*   PLT 89* 58*  --  66* 124*      INR    Recent Labs  Lab 07/06/18  0904 07/03/18  0637 07/02/18  1606 07/02/18  1436   INR 1.09 1.30* 1.40* 1.52*      Hepatic Panel     Recent Labs  Lab 07/01/18  0621 06/29/18  1713   AST 20 22   ALT 26 28   ALKPHOS 100 108   BILITOTAL 1.0 0.9   ALBUMIN 3.1* 3.4     Glucose  Recent Labs  Lab 07/06/18  0955 07/06/18  0945 07/06/18  0807 07/06/18  0604 07/06/18  0402 07/06/18  0242 07/06/18  0134  07/05/18  0342  07/04/18  1601  07/04/18  0400  07/03/18  1221  07/03/18  0358   GLC  --  128*  --   --   --   --   --   --  134*  --  107*  --  142*  --  193*  --  177*   *  --  112* 133* 132* 143* 167*  < >  --   < >  --   < >  --   < >  --   < >  --    < > = values in this interval not displayed.    Imaging: CXR imaging pending 7/6    No results found for this or any previous visit (from the past 24 hour(s)).        ASSESSMENT/PLAN: Patient is a 73 year old female with a history of DM2, HTN, CKD3, HLD, COPD, fibromyalgia, anxiety, depression, lymphedema, and CAD now s/p CABG x 3v on 7/2/2018 with Dr. Mason.    Neuro:  -Acute post-op pain: IV dilaudid, tylenol,     CV:  -  mg, atorvastatin 40 mg daily, metoprolol 12.5 mg BID.  - Blood pressure: 120s/60s  - TPW removed     Resp: extubated POD 2  - IS, encourage activity  - Mediastinal CT removed, Left Pleural chest tube with serosanguinous drainage, minimal output, no air leak.       FEN/GI:   - 7/6 start of full liquid diet with nectar thin liquids, bowel regimen, - BM  - Speech and dietician following, advance diet per recs    Renal:   - CKD3  - Creatinine 1.38 and trending down, at baseline 1.04, adequate UOP  - Volume status: hypervolemic, dry weight ~ 302 lbs, current weight 315lbs.  - 7/6 Lasix 40 mg BID with potassium supplement, re-assess diuresis needs 7/7        ID: Completed ralph-op abx,   - WBC 8.5, afebrile, no signs or symptoms of infection    Heme:   - Hgb 7.8, trending up.  No signs or symptoms of bleeding      Endo:   - DM2  - BG  100-150s, currently on insulin gtt  - 7/6 endocrine consult, plan to transition off gtt and will mange per endo recs      PPx:   - PPI, 5000 units SQ heparin      Anticoagulation:   - ASA therapy  - HIT negative, Plts 89, trending up. No signs or symptoms of bleeding       Dispo:   - 6C since 7/5  - Therapy recommending d/c to ARU    Staff surgeons have been informed of changes through both verbal and written communication.         Kim CORNEJO NP-C  Cardiothoracic Surgery  July 6, 2018 10:25 AM   p: [phone]

## 2018-07-06 NOTE — PROGRESS NOTES
Social Work: Assessment with Discharge Plan    Patient Name:  Mariel Palmer  :  1945  Age:  73 year old  MRN:  [id number]  Risk/Complexity Score:     Completed assessment with:  Pt    Presenting Information   Reason for Referral:  Discharge plan  Date of Intake:  2018  Referral Source:  Chart Review  Decision Maker:  Pt when able  Alternate Decision Maker:  NOK is Odalis, pt's friend and roommate  Health Care Directive:  Provided education  Living Situation:  Apartment  Previous Functional Status:  Independent  Patient and family understanding of hospitalization:  Pt states that she will consider ARU placement post hospitalization.  Cultural/Language/Spiritual Considerations:  No needs reported.  Adjustment to Illness:  Pt adjusting appropriately to situation.    Physical Health  Reason for Admission:    1. Acute chest pain    2. Hypokalemia      Services Needed/Recommended:  ARU    Mental Health/Chemical Dependency  Diagnosis:  None reported  Support/Services in Place:  None  Services Needed/Recommended:  None    Support System  Significant relationship at present time:  Roommate is Odalis, pt states Odalis is a primary caregiver for information.  Family of origin is available for support:  Unclear  Other support available:  Yes  Gaps in support system:  Pt agreeable to rehab placement post hospitalization.  Patient is caregiver to:  None     Provider Information   Primary Care Physician:  Rafaela William   [phone]   Clinic:  [address] / [place] [address]      :  None    Financial   Income Source:  Reitred  Financial Concerns:  None  Insurance:    Payor/Plan Subscriber Name Rel Member # Group #   MEDICARE - MEDICARE MARIEL PALMER  [id number]       ATT[employer] CLAIMS, [address]   COMMERCIAL - AAR MARIEL APLMER  [id number]      [facility] CLAIM DIV, [address]       Discharge Plan   Patient and family discharge goal:  ARU  Provided education on  discharge plan:  YES  Patient agreeable to discharge plan:  YES  A list of Medicare Certified Facilities was provided to the patient and/or family to encourage patient choice. Patient's choices for facility are:    [facility]  [phone]    Will NH provide Skilled rehabilitation or complex medical:  YES  General information regarding anticipated insurance coverage and possible out of pocket cost was discussed. Patient and patient's family are aware patient may incur the cost of transportation to the facility, pending insurance payment: YES  Barriers to discharge:  Medical stability    Discharge Recommendations   Anticipated Disposition:  Facility:  ARU  Transportation Needs:  Medical:  Wheelchair  Name of Transportation Company and Phone:  [employer] PH: [phone].    Additional comments   SW met with pt to introduce self and role in consult.  Pt in agreement with pursuing ARU placement.  SW to start referral.    SAMANTHA Baptiste, APSW  6C Unit   Phone: [phone]  Pager: [phone]  Unit: [phone]

## 2018-07-06 NOTE — PLAN OF CARE
Problem: Patient Care Overview  Goal: Plan of Care/Patient Progress Review  RN:  1.Pt will remain hemodynamically stable.    2.Tropinin/EKG WNL.   Outcome: No Change  D: S/p CABG x3 7/2  I/A: Monitored vitals and assessed pt status. A&O x4. VSS see flowsheet. On 2L O2 via NC. BiPAP for sleep. Insulin gtt at 4 units/hr. D5 gtt at 100 mL/hr. Good urine output via liriano catheter. x3 CT at -20 suction. Pacer wires capped. Dressings C/D/I. Legs wrapped for lymphedema. Sleeping between cares.  P: Assess need for PICC. Continue to monitor and follow POC. Notify CVTS with changes.
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
## 2018-07-06 NOTE — PLAN OF CARE
Problem: Patient Care Overview  Goal: Plan of Care/Patient Progress Review  RN:  1.Pt will remain hemodynamically stable.    2.Tropinin/EKG WNL.   Discharge Planner OT   Patient plan for discharge: Rehab  Current status: Pt demonstrated significant improvement with functional transfers this afternoon - requiring only min A of 1-2 with VCs from both recliner and BSC. Pt able to mobilize up to 6ft with CGA using FWW. Pt with significant SOB during activity - O2 sats down to 94% on 2L NC (from 99% resting); cues for PLB provided throughout. Pt's HR , pre /46, post 146/94 - RN informed; after 5 min rest /69.  Barriers to return to prior living situation: pain, surgical precautions, weakness, impaired balance  Recommendations for discharge: ARU  Rationale for recommendations: to increase pt's (I) with ADL/IADLs       Entered by: Odilia Grider 07/06/2018 4:32 PM

## 2018-07-06 NOTE — PROGRESS NOTES
07/06/18 0900   Quick Adds   Type of Visit Initial PT Evaluation   Living Environment   Lives With significant other   Living Arrangements house   Home Accessibility stairs to enter home   Number of Stairs to Enter Home 2   Number of Stairs Within Home 0   Living Environment Comment Per pt she lives with roommate, is at home alone during the day, has some assist fro friends through out the week.   Self-Care   Dominant Hand right   Usual Activity Tolerance moderate   Current Activity Tolerance poor   Equipment Currently Used at Home cane, straight   Activity/Exercise/Self-Care Comment Per pt she was IND with most ADL's prior to admission, getting assist intermittently.  Roommate assist with IADL's, was walking her dog several times a day.   Functional Level Prior   Ambulation 1-->assistive equipment   Transferring 0-->independent   Toileting 0-->independent   Bathing 3-->assistive equipment and person   Dressing 0-->independent   Eating 0-->independent   Communication 0-->understands/communicates without difficulty   Swallowing 0-->swallows foods/liquids without difficulty   Cognition 0 - no cognition issues reported   Fall history within last six months yes   Number of times patient has fallen within last six months (Per pt several)   Which of the above functional risks had a recent onset or change? ambulation;transferring   Prior Functional Level Comment Per pt she was using a cane prior to admission, has a walker at home, reports she falls at lot but did not give a number.   General Information   Onset of Illness/Injury or Date of Surgery - Date 06/29/18   Referring Physician Kb Jeffries PA-C   Patient/Family Goals Statement Pt wants to get back to PLOF.   Pertinent History of Current Problem (include personal factors and/or comorbidities that impact the POC) 73 year old female s/p 3v CABG (Left internal mammary artery to left anterior descending artery, Reversed saphenous vein graft to right posterior  descending artery, Reversed saphenous vein graft to obtuse marginal artery. Extubated 7/3 to bipap. Initially lethargic improving with holding narcotics   Precautions/Limitations fall precautions;sternal precautions   Weight-Bearing Status - LUE full weight-bearing   Weight-Bearing Status - RUE full weight-bearing   Weight-Bearing Status - LLE full weight-bearing   Weight-Bearing Status - RLE full weight-bearing   General Observations On 3L, 1 chest tubem liriano, several lines   General Info Comments Activity: ambulate with assist   Cognitive Status Examination   Orientation orientation to person, place and time   Level of Consciousness alert   Follows Commands and Answers Questions 100% of the time   Personal Safety and Judgment intact   Memory intact   Pain Assessment   Patient Currently in Pain Yes, see Vital Sign flowsheet  (At sternal incision)   Integumentary/Edema   Integumentary/Edema Comments B WM edema, per pt was also wearing compression socks prior to admission.   Posture    Posture Protracted shoulders;Forward head position   Range of Motion (ROM)   ROM Comment ROM WFL   Strength   Strength Comments JOANA LE's grossly 4/5, fatiguing quickly   Bed Mobility   Bed Mobility Comments ModA x 2 for bed mobility through use of log roll   Transfer Skills   Transfer Comments April x 2 for sit<>stand with heart pillow, rocking for momentum   Gait   Gait Comments Ambulated 5 steps to chair with CGA x 2 for safety, fatigued quickly   Balance   Balance Comments Impaired stanidng balance, need for AD in standing, falls risk   Sensory Examination   Sensory Perception no deficits were identified   Coordination   Coordination no deficits were identified   Muscle Tone   Muscle Tone no deficits were identified   General Therapy Interventions   Planned Therapy Interventions balance training;bed mobility training;gait training;strengthening;transfer training;risk factor education;progressive activity/exercise;home program  "guidelines   Clinical Impression   Criteria for Skilled Therapeutic Intervention yes, treatment indicated   PT Diagnosis Impaired functional mobility   Influenced by the following impairments Decreased LE strength, balance and endurance, increased pain   Functional limitations due to impairments Inability to complete functional mobility at baseline level of functioning   Clinical Presentation Stable/Uncomplicated   Clinical Presentation Rationale Medically stable, still on O2   Clinical Decision Making (Complexity) Moderate complexity   Therapy Frequency` (6x/week)   Predicted Duration of Therapy Intervention (days/wks) 1 week   Anticipated Discharge Disposition Acute Rehabilitation Facility   Risk & Benefits of therapy have been explained Yes   Patient, Family & other staff in agreement with plan of care Yes   Clinical Impression Comments Pt would benefit from IP PT in order to increase strength and IND with functional mobility.   [facility] Volpit-PAC TM \"6 Clicks\"   2016, Trustees of [facility], under license to [employer].  All rights reserved.   6 Clicks Short Forms Basic Mobility Inpatient Short Form   [facility] AM-PAC  \"6 Clicks\" V.2 Basic Mobility Inpatient Short Form   1. Turning from your back to your side while in a flat bed without using bedrails? 2 - A Lot   2. Moving from lying on your back to sitting on the side of a flat bed without using bedrails? 2 - A Lot   3. Moving to and from a bed to a chair (including a wheelchair)? 3 - A Little   4. Standing up from a chair using your arms (e.g., wheelchair, or bedside chair)? 3 - A Little   5. To walk in hospital room? 3 - A Little   6. Climbing 3-5 steps with a railing? 2 - A Lot   Basic Mobility Raw Score (Score out of 24.Lower scores equate to lower levels of function) 15   Total Evaluation Time   Total Evaluation Time (Minutes) 10     "

## 2018-07-06 NOTE — PLAN OF CARE
Problem: Patient Care Overview  Goal: Plan of Care/Patient Progress Review  RN:  1.Pt will remain hemodynamically stable.    2.Tropinin/EKG WNL.   Discharge Planner SLP   Patient plan for discharge: Did not discuss  Current status: Pt demonstrates improvement in swallowing function however continues to demonstrate clinical s/sx of aspiration on thin liquid trials. Recommend cautiously advancing to Nectar Thickened Full Liquid Diet. Sit pt upright for po intake. Encourage small bites/sips and slow rate. Alternate bites/sips. Pills crushed in applesauce. SLP to follow.   Barriers to return to prior living situation: Dysphagia  Recommendations for discharge: TCU  Rationale for recommendations: Anticipate ongoing needs after discharge.        Entered by: Opal Carvajal 07/06/2018 9:09 AM

## 2018-07-06 NOTE — PLAN OF CARE
Problem: Patient Care Overview  Goal: Plan of Care/Patient Progress Review  RN:  1.Pt will remain hemodynamically stable.    2.Tropinin/EKG WNL.   Discharge Planner PT   Patient plan for discharge: Rehab  Current status: PT evaluation completed and treatment initiated.  Pt re-educated on sternal precautions and use of heart pillow.  Pt needing modA x 2 for bed mobility and Kath x 2 for sit<>stand transfers.  Pt able to march in place then take several steps to bed side chair with use of walker and CGA x 2 for safety and line management, on 3L O2 through out.  Staff OK to be transferring pt to/from chair with A x 2.  Continue to encourage sitting up in chair.   Barriers to return to prior living situation: Need for increased assist with bed mobility, transfers and ambulation, stairs at home, poor activity tolerance compared to baseline  Recommendations for discharge: ARU  Rationale for recommendations: To increase strength and IND with functional mobility       Entered by: Aileen Barillas 07/06/2018 10:10 AM

## 2018-07-06 NOTE — PROGRESS NOTES
CARDIAC ICU PROGRESS NOTE  07/05/18    CO-MORBIDITIES:   Acute chest pain   Hypokalemia  DM2, HTN, CKD3, COPD, fibromyalgia, anxiety, depression, lymphedema, CAD    ASSESSMENT: Mariel Ribeiro is a 73 year old female s/p 3v CABG (Left internal mammary artery to left anterior descending artery, Reversed saphenous vein graft to right posterior descending artery, Reversed saphenous vein graft to obtuse marginal artery. Extubated 7/3 to bipap. Initially lethargic improving with holding narcotics    CHANGES FOR TODAY:  - Restart home meds  - Continue NPO except meds  - Pull Art line  - Transfer to floor    PLAN:  Neuro/ pain/ sedation:  - Monitor neurological status. Notify the MD for any acute changes in exam.  - Toradol 15mg q 6hrs,  - Escitalopram, pregabalin, hold Trazadone  - PRN tramadol, PRN dilaudid for pain  - Hold sedative medications continued drowsiness  - Metoprolol 5mg IV q 6hrs    Pulmonary care:   - extubated to bipap 10/5, 40%, now on nasal cannula  - Supplemental oxygen to keep saturation above 92 %.  - Incentive spirometer every 15- 30 minutes when awake and off bipap  - albuterol nebs    Cardiovascular:    # 3v CAB LIMA to LAD, SVG to OM, SVG to PDA  - ASA 325mg,   - Monitor hemodynamic status.   - amlodipine 5mg daily    GI care:   - NPO except meds in applesauce until improvement in swallowing  - Senokot BID    Fluids/ Electrolytes/ Nutrition:   - D5 1/2NS + 20K  for IV fluid hydration  - Lyte replacement per protocol  - No indication for parenteral nutrition.    Renal/ Fluid Balance:    - Urine output is adequate so far. Continue liriano  - Held PTA furosemide for soft pressures  - Will continue to monitor intake and output.    Endocrine:    - insulin gtt  - PTA levothyroxine    ID/ Antibiotics:  - Perioperative clindamycin    Heme:     #postop anemia  - Hemoglobin stable    MSK:  -PT/OT    Prophylaxis:    - Heparin 5000 TID  - SCDs    Lines/ tubes/ drains:  - PIV x 2  - CT x 3  -  Brenda    Disposition:  - Transfer to floor    Patient seen, findings and plan discussed with CVTS team.    Jose Armando Sahni MD   Gen Surg PGY-3  P: [phone]      ====================================    SUBJECTIVE:   -No acute issues overnight. Continues to have improvement in alertness. Continuing to have concerns for aspiration.    OBJECTIVE:   1. VITAL SIGNS:   Temp:  [97.7  F (36.5  C)-98.6  F (37  C)] 98.1  F (36.7  C)  Heart Rate:  [] 89  Resp:  [18-25] 20  BP: (129-143)/(56-73) 141/73  MAP:  [72 mmHg-97 mmHg] 80 mmHg  Arterial Line BP: (111-168)/(51-70) 130/56  FiO2 (%):  [30 %] 30 %  SpO2:  [93 %-100 %] 98 %  FiO2 (%): 30 %  Resp: 20    2. INTAKE/ OUTPUT:   I/O last 3 completed shifts:  In: 3192.2 [I.V.:2935.53]  Out: 2005 [Urine:1525; Chest Tube:480]    3. PHYSICAL EXAMINATION:   General: Sitting up in chair wearing cap  Neuro: A&O, NAD  Resp: Breathing non-labored on bipap. Surgical dressings in place. CTs with serosang output  CV: RRR  Abdomen: Soft, Non-distended, Non-tender  Incisions: Bandages in place  Extremities: warm and well perfused    4. INVESTIGATIONS:   Arterial Blood Gases     Recent Labs  Lab 07/04/18  1111 07/04/18  0359 07/03/18  1458 07/03/18  1145   PH 7.38 7.35 7.33* 7.31*   PCO2 44 47* 43 41   PO2 125* 177* 134* 144*   HCO3 26 26 22 21     Complete Blood Count     Recent Labs  Lab 07/05/18  0342 07/04/18  1008 07/04/18  0400 07/03/18  1221 07/03/18  0358   WBC 7.4  --  7.6 17.2* 16.7*   HGB 7.1* 7.7* 6.4* 7.6* 7.8*   PLT 58*  --  66* 124* 114*     Basic Metabolic Panel    Recent Labs  Lab 07/05/18  0342 07/04/18  1601 07/04/18  0400 07/03/18  1221   * 149* 147* 144   POTASSIUM 4.2 3.6 4.2 4.5   CHLORIDE 115* 119* 113* 112*   CO2 27 24 28 25   BUN 19 16 17 17   CR 1.44* 1.25* 1.52* 1.56*   * 107* 142* 193*     Liver Function Tests    Recent Labs  Lab 07/03/18  0637 07/02/18  1606 07/02/18  1436 07/01/18  0621 06/29/18  1713   AST  --   --   --  20 22   ALT  --   --   --   26 28   ALKPHOS  --   --   --  100 108   BILITOTAL  --   --   --  1.0 0.9   ALBUMIN  --   --   --  3.1* 3.4   INR 1.30* 1.40* 1.52*  --  1.01     Pancreatic Enzymes  No lab results found in last 7 days.  Coagulation Profile    Recent Labs  Lab 07/03/18  0637 07/02/18  1606 07/02/18  1436 06/29/18  1713   INR 1.30* 1.40* 1.52* 1.01   PTT  --  37 33  --      Lactate  Invalid input(s): LACTATE    5. RADIOLOGY:   CXR:  1. Interval removal of the [place] catheter, other support devices  are stable.  2. Unchanged enlarged cardiomediastinal silhouette, low lung volumes  and retrocardiac atelectasis.

## 2018-07-07 ENCOUNTER — APPOINTMENT (OUTPATIENT)
Dept: SPEECH THERAPY | Facility: CLINIC | Age: 73
DRG: 234 | End: 2018-07-07
Payer: MEDICARE

## 2018-07-07 LAB
ANION GAP SERPL CALCULATED.3IONS-SCNC: 9 MMOL/L (ref 3–14)
BUN SERPL-MCNC: 25 MG/DL (ref 7–30)
CALCIUM SERPL-MCNC: 9.1 MG/DL (ref 8.5–10.1)
CHLORIDE SERPL-SCNC: 116 MMOL/L (ref 94–109)
CO2 SERPL-SCNC: 25 MMOL/L (ref 20–32)
CREAT SERPL-MCNC: 1.49 MG/DL (ref 0.52–1.04)
ERYTHROCYTE [DISTWIDTH] IN BLOOD BY AUTOMATED COUNT: 16.1 % (ref 10–15)
GFR SERPL CREATININE-BSD FRML MDRD: 34 ML/MIN/1.7M2
GLUCOSE BLDC GLUCOMTR-MCNC: 110 MG/DL (ref 70–99)
GLUCOSE BLDC GLUCOMTR-MCNC: 112 MG/DL (ref 70–99)
GLUCOSE BLDC GLUCOMTR-MCNC: 124 MG/DL (ref 70–99)
GLUCOSE BLDC GLUCOMTR-MCNC: 126 MG/DL (ref 70–99)
GLUCOSE BLDC GLUCOMTR-MCNC: 129 MG/DL (ref 70–99)
GLUCOSE BLDC GLUCOMTR-MCNC: 130 MG/DL (ref 70–99)
GLUCOSE BLDC GLUCOMTR-MCNC: 132 MG/DL (ref 70–99)
GLUCOSE BLDC GLUCOMTR-MCNC: 133 MG/DL (ref 70–99)
GLUCOSE BLDC GLUCOMTR-MCNC: 198 MG/DL (ref 70–99)
GLUCOSE BLDC GLUCOMTR-MCNC: 234 MG/DL (ref 70–99)
GLUCOSE BLDC GLUCOMTR-MCNC: 238 MG/DL (ref 70–99)
GLUCOSE BLDC GLUCOMTR-MCNC: 256 MG/DL (ref 70–99)
GLUCOSE BLDC GLUCOMTR-MCNC: 342 MG/DL (ref 70–99)
GLUCOSE BLDC GLUCOMTR-MCNC: 351 MG/DL (ref 70–99)
GLUCOSE BLDC GLUCOMTR-MCNC: 96 MG/DL (ref 70–99)
GLUCOSE SERPL-MCNC: 129 MG/DL (ref 70–99)
HCT VFR BLD AUTO: 25 % (ref 35–47)
HGB BLD-MCNC: 7.6 G/DL (ref 11.7–15.7)
MCH RBC QN AUTO: 28.1 PG (ref 26.5–33)
MCHC RBC AUTO-ENTMCNC: 30.4 G/DL (ref 31.5–36.5)
MCV RBC AUTO: 93 FL (ref 78–100)
PLATELET # BLD AUTO: 110 10E9/L (ref 150–450)
POTASSIUM SERPL-SCNC: 4.6 MMOL/L (ref 3.4–5.3)
RBC # BLD AUTO: 2.7 10E12/L (ref 3.8–5.2)
SODIUM SERPL-SCNC: 150 MMOL/L (ref 133–144)
WBC # BLD AUTO: 7.2 10E9/L (ref 4–11)

## 2018-07-07 PROCEDURE — 94640 AIRWAY INHALATION TREATMENT: CPT | Mod: 76 | Performed by: OPTOMETRIST

## 2018-07-07 PROCEDURE — 94640 AIRWAY INHALATION TREATMENT: CPT | Mod: 76

## 2018-07-07 PROCEDURE — 25000132 ZZH RX MED GY IP 250 OP 250 PS 637: Mod: GY | Performed by: NURSE PRACTITIONER

## 2018-07-07 PROCEDURE — 40000802 ZZH SITE CHECK

## 2018-07-07 PROCEDURE — 36415 COLL VENOUS BLD VENIPUNCTURE: CPT | Performed by: NURSE PRACTITIONER

## 2018-07-07 PROCEDURE — 80048 BASIC METABOLIC PNL TOTAL CA: CPT | Performed by: NURSE PRACTITIONER

## 2018-07-07 PROCEDURE — 21400006 ZZH R&B CCU INTERMEDIATE UMMC

## 2018-07-07 PROCEDURE — 94660 CPAP INITIATION&MGMT: CPT

## 2018-07-07 PROCEDURE — 40000225 ZZH STATISTIC SLP WARD VISIT: Performed by: SPEECH-LANGUAGE PATHOLOGIST

## 2018-07-07 PROCEDURE — 25000132 ZZH RX MED GY IP 250 OP 250 PS 637: Mod: GY | Performed by: STUDENT IN AN ORGANIZED HEALTH CARE EDUCATION/TRAINING PROGRAM

## 2018-07-07 PROCEDURE — 25000132 ZZH RX MED GY IP 250 OP 250 PS 637: Mod: GY | Performed by: THORACIC SURGERY (CARDIOTHORACIC VASCULAR SURGERY)

## 2018-07-07 PROCEDURE — A9270 NON-COVERED ITEM OR SERVICE: HCPCS | Mod: GY | Performed by: NURSE PRACTITIONER

## 2018-07-07 PROCEDURE — 94640 AIRWAY INHALATION TREATMENT: CPT

## 2018-07-07 PROCEDURE — 25000132 ZZH RX MED GY IP 250 OP 250 PS 637: Mod: GY | Performed by: ANESTHESIOLOGY

## 2018-07-07 PROCEDURE — 93010 ELECTROCARDIOGRAM REPORT: CPT | Performed by: INTERNAL MEDICINE

## 2018-07-07 PROCEDURE — A9270 NON-COVERED ITEM OR SERVICE: HCPCS | Mod: GY | Performed by: THORACIC SURGERY (CARDIOTHORACIC VASCULAR SURGERY)

## 2018-07-07 PROCEDURE — 25000132 ZZH RX MED GY IP 250 OP 250 PS 637: Mod: GY | Performed by: PHYSICIAN ASSISTANT

## 2018-07-07 PROCEDURE — 00000146 ZZHCL STATISTIC GLUCOSE BY METER IP

## 2018-07-07 PROCEDURE — 40000275 ZZH STATISTIC RCP TIME EA 10 MIN

## 2018-07-07 PROCEDURE — 25000128 H RX IP 250 OP 636: Performed by: PHYSICIAN ASSISTANT

## 2018-07-07 PROCEDURE — 92526 ORAL FUNCTION THERAPY: CPT | Mod: GN | Performed by: SPEECH-LANGUAGE PATHOLOGIST

## 2018-07-07 PROCEDURE — A9270 NON-COVERED ITEM OR SERVICE: HCPCS | Mod: GY | Performed by: STUDENT IN AN ORGANIZED HEALTH CARE EDUCATION/TRAINING PROGRAM

## 2018-07-07 PROCEDURE — A9270 NON-COVERED ITEM OR SERVICE: HCPCS | Mod: GY | Performed by: PHYSICIAN ASSISTANT

## 2018-07-07 PROCEDURE — 85027 COMPLETE CBC AUTOMATED: CPT | Performed by: NURSE PRACTITIONER

## 2018-07-07 PROCEDURE — 25000125 ZZHC RX 250: Performed by: THORACIC SURGERY (CARDIOTHORACIC VASCULAR SURGERY)

## 2018-07-07 PROCEDURE — 25000125 ZZHC RX 250: Performed by: PHYSICIAN ASSISTANT

## 2018-07-07 PROCEDURE — 40000275 ZZH STATISTIC RCP TIME EA 10 MIN: Performed by: OPTOMETRIST

## 2018-07-07 PROCEDURE — 25000128 H RX IP 250 OP 636: Performed by: NURSE PRACTITIONER

## 2018-07-07 PROCEDURE — A9270 NON-COVERED ITEM OR SERVICE: HCPCS | Mod: GY | Performed by: ANESTHESIOLOGY

## 2018-07-07 PROCEDURE — 25000125 ZZHC RX 250: Performed by: STUDENT IN AN ORGANIZED HEALTH CARE EDUCATION/TRAINING PROGRAM

## 2018-07-07 PROCEDURE — 93005 ELECTROCARDIOGRAM TRACING: CPT

## 2018-07-07 RX ORDER — PANTOPRAZOLE SODIUM 40 MG/1
40 TABLET, DELAYED RELEASE ORAL
Status: DISCONTINUED | OUTPATIENT
Start: 2018-07-08 | End: 2018-07-13 | Stop reason: HOSPADM

## 2018-07-07 RX ORDER — ALBUTEROL SULFATE 0.83 MG/ML
2.5 SOLUTION RESPIRATORY (INHALATION)
Status: DISCONTINUED | OUTPATIENT
Start: 2018-07-07 | End: 2018-07-13 | Stop reason: HOSPADM

## 2018-07-07 RX ORDER — HYDRALAZINE HYDROCHLORIDE 25 MG/1
25 TABLET, FILM COATED ORAL 3 TIMES DAILY
Status: DISCONTINUED | OUTPATIENT
Start: 2018-07-08 | End: 2018-07-08

## 2018-07-07 RX ORDER — FUROSEMIDE 10 MG/ML
60 INJECTION INTRAMUSCULAR; INTRAVENOUS 2 TIMES DAILY
Status: DISCONTINUED | OUTPATIENT
Start: 2018-07-07 | End: 2018-07-08

## 2018-07-07 RX ORDER — POTASSIUM CHLORIDE 750 MG/1
30 TABLET, EXTENDED RELEASE ORAL 2 TIMES DAILY
Status: DISCONTINUED | OUTPATIENT
Start: 2018-07-07 | End: 2018-07-08

## 2018-07-07 RX ORDER — FUROSEMIDE 10 MG/ML
60 INJECTION INTRAMUSCULAR; INTRAVENOUS 2 TIMES DAILY
Status: DISCONTINUED | OUTPATIENT
Start: 2018-07-07 | End: 2018-07-07

## 2018-07-07 RX ADMIN — FEBUXOSTAT 40 MG: 40 TABLET ORAL at 21:28

## 2018-07-07 RX ADMIN — POTASSIUM CHLORIDE 30 MEQ: 750 TABLET, EXTENDED RELEASE ORAL at 14:27

## 2018-07-07 RX ADMIN — GUAIFENESIN 200 MG: 100 SOLUTION ORAL at 04:21

## 2018-07-07 RX ADMIN — POTASSIUM CHLORIDE 30 MEQ: 750 TABLET, EXTENDED RELEASE ORAL at 21:24

## 2018-07-07 RX ADMIN — ACETAMINOPHEN 975 MG: 325 TABLET, FILM COATED ORAL at 13:00

## 2018-07-07 RX ADMIN — ALBUTEROL SULFATE 2.5 MG: 2.5 SOLUTION RESPIRATORY (INHALATION) at 20:39

## 2018-07-07 RX ADMIN — METOPROLOL TARTRATE 25 MG: 25 TABLET, FILM COATED ORAL at 21:25

## 2018-07-07 RX ADMIN — SENNOSIDES AND DOCUSATE SODIUM 2 TABLET: 8.6; 5 TABLET ORAL at 21:21

## 2018-07-07 RX ADMIN — AMLODIPINE BESYLATE 5 MG: 5 TABLET ORAL at 09:00

## 2018-07-07 RX ADMIN — GUAIFENESIN 200 MG: 100 SOLUTION ORAL at 21:30

## 2018-07-07 RX ADMIN — ESCITALOPRAM 20 MG: 20 TABLET, FILM COATED ORAL at 08:58

## 2018-07-07 RX ADMIN — ALBUTEROL SULFATE 2.5 MG: 2.5 SOLUTION RESPIRATORY (INHALATION) at 09:45

## 2018-07-07 RX ADMIN — SENNOSIDES AND DOCUSATE SODIUM 2 TABLET: 8.6; 5 TABLET ORAL at 08:59

## 2018-07-07 RX ADMIN — ACETAMINOPHEN 975 MG: 325 TABLET, FILM COATED ORAL at 04:22

## 2018-07-07 RX ADMIN — FUROSEMIDE 40 MG: 10 INJECTION, SOLUTION INTRAVENOUS at 09:01

## 2018-07-07 RX ADMIN — FUROSEMIDE 60 MG: 10 INJECTION, SOLUTION INTRAVENOUS at 18:06

## 2018-07-07 RX ADMIN — PREGABALIN 100 MG: 100 CAPSULE ORAL at 21:26

## 2018-07-07 RX ADMIN — POTASSIUM CHLORIDE 40 MEQ: 750 TABLET, EXTENDED RELEASE ORAL at 08:58

## 2018-07-07 RX ADMIN — LIDOCAINE 1 PATCH: 560 PATCH PERCUTANEOUS; TOPICAL; TRANSDERMAL at 21:46

## 2018-07-07 RX ADMIN — ASPIRIN 325 MG ORAL TABLET 325 MG: 325 PILL ORAL at 09:00

## 2018-07-07 RX ADMIN — Medication 500 MG: at 09:00

## 2018-07-07 RX ADMIN — ATORVASTATIN CALCIUM 40 MG: 40 TABLET, FILM COATED ORAL at 21:27

## 2018-07-07 RX ADMIN — GUAIFENESIN 200 MG: 100 SOLUTION ORAL at 13:00

## 2018-07-07 RX ADMIN — Medication 3 MG: at 21:27

## 2018-07-07 RX ADMIN — POLYETHYLENE GLYCOL 3350 17 G: 17 POWDER, FOR SOLUTION ORAL at 08:59

## 2018-07-07 RX ADMIN — LEVOTHYROXINE SODIUM 150 MCG: 150 TABLET ORAL at 09:00

## 2018-07-07 RX ADMIN — ALBUTEROL SULFATE 2.5 MG: 2.5 SOLUTION RESPIRATORY (INHALATION) at 01:49

## 2018-07-07 RX ADMIN — HEPARIN SODIUM 5000 UNITS: 5000 INJECTION, SOLUTION INTRAVENOUS; SUBCUTANEOUS at 06:15

## 2018-07-07 RX ADMIN — HUMAN INSULIN 12 UNITS/HR: 100 INJECTION, SOLUTION SUBCUTANEOUS at 00:22

## 2018-07-07 RX ADMIN — Medication 500 MG: at 18:06

## 2018-07-07 RX ADMIN — METOPROLOL TARTRATE 25 MG: 25 TABLET, FILM COATED ORAL at 08:59

## 2018-07-07 RX ADMIN — GUAIFENESIN 200 MG: 100 SOLUTION ORAL at 08:58

## 2018-07-07 RX ADMIN — HUMAN INSULIN 0 UNITS/HR: 100 INJECTION, SOLUTION SUBCUTANEOUS at 04:15

## 2018-07-07 RX ADMIN — PREGABALIN 100 MG: 100 CAPSULE ORAL at 09:28

## 2018-07-07 RX ADMIN — HEPARIN SODIUM 5000 UNITS: 5000 INJECTION, SOLUTION INTRAVENOUS; SUBCUTANEOUS at 21:31

## 2018-07-07 RX ADMIN — HEPARIN SODIUM 5000 UNITS: 5000 INJECTION, SOLUTION INTRAVENOUS; SUBCUTANEOUS at 13:00

## 2018-07-07 RX ADMIN — FUROSEMIDE 60 MG: 10 INJECTION, SOLUTION INTRAVENOUS at 14:27

## 2018-07-07 RX ADMIN — ALBUTEROL SULFATE 2.5 MG: 2.5 SOLUTION RESPIRATORY (INHALATION) at 16:51

## 2018-07-07 RX ADMIN — GUAIFENESIN 200 MG: 100 SOLUTION ORAL at 16:34

## 2018-07-07 RX ADMIN — PANTOPRAZOLE SODIUM 40 MG: 40 INJECTION, POWDER, FOR SOLUTION INTRAVENOUS at 09:01

## 2018-07-07 RX ADMIN — GUAIFENESIN 200 MG: 100 SOLUTION ORAL at 00:54

## 2018-07-07 RX ADMIN — HUMAN INSULIN 4 UNITS/HR: 100 INJECTION, SOLUTION SUBCUTANEOUS at 10:37

## 2018-07-07 RX ADMIN — ACETAMINOPHEN 975 MG: 325 TABLET, FILM COATED ORAL at 21:22

## 2018-07-07 RX ADMIN — ALBUTEROL SULFATE 2.5 MG: 2.5 SOLUTION RESPIRATORY (INHALATION) at 12:47

## 2018-07-07 ASSESSMENT — PAIN DESCRIPTION - DESCRIPTORS
DESCRIPTORS: DISCOMFORT
DESCRIPTORS: ACHING
DESCRIPTORS: DISCOMFORT
DESCRIPTORS: SHARP

## 2018-07-07 NOTE — PROVIDER NOTIFICATION
"Pt c/o left sharp chest pain. States \"comes and goes\" but feels \"sharper than normal. Rafaela from CVTS notified and at bedside. STAT EKG completed--nothing significant. VS stable. Rest and states it is \"getting better.\"   "

## 2018-07-07 NOTE — PROGRESS NOTES
Diabetes Consult Daily  Progress Note          Assessment/Plan:     Mariel Ribeiro is a 73 year old female Past mental history of diabetes,obesity, COPD, CAD with history of stent placement, CKD stage III, hyperlipidemia,fibromyalgia, anxiety, depression, lymphedema, S/P CABG on 07/ 02/18                       DMT2 not well controlled PTA ('s occasional < 200) on regimen of NPH and regular insulin she did not know doses and no records from PCP  On IV insulin requiring high rates yesterday so given 30 units Lantus last evening and continue with IV. Rates decreased some overnight  Now 2-4 units mainly 2 units per hour  this AM given lantus 40 units.  plan to transition off IV today on BID lantus, novolog carb coverage and correction  1100 due to only one IV access will D/C IV insulin now  PLAN   Lantus 40 units BID  Novolog carb coverage  1unit/10 grams TID with meals and snacks  When transition off IV insulin then start:  Novolog SS high intensity correction 1unit/25 mg/dl TID at scheduled mealtimes for BG > 140 and HS BG>200  BGM 5x/d Tid pre meal, hs and 0200AM  Will continue to follow     Outpatient diabetes follow up: TBD  Plan discussed with patient, bedside RN, and primary team.           Interval History:   The last 24 hours progress and nursing notes reviewed.  On IV insulin requiring high rates so given 30 units Lantus last evening and continue with IV.  Rates decreased some overnight  Now 2-4 units mainly 2 units per hour  lantus AM 40 units plan to transition off IV today on BID lantus, novolog carb coverage and correction        Recent Labs  Lab 07/07/18  0948 07/07/18  0839 07/07/18  0743 07/07/18  0620 07/07/18  0546 07/07/18  0520 07/07/18  0412  07/06/18  0945  07/05/18  0342  07/04/18  1601  07/04/18  0400  07/03/18  1221   GLC  --   --   --   --  129*  --   --   --  128*  --  134*  --  107*  --  142*  --  193*   * 110* 132* 124*  --  126* 96  < >  --   < >  --   <  ">  --   < >  --   < >  --    < > = values in this interval not displayed.            Review of Systems:   See interval hx          Medications:       Active Diet Order      Full Liquid Diet Nectar Thickened Liquids (pre-thickened or use instant food thickener)     Physical Exam:  Gen: Alert, resting in bed, in NAD   HEENT: NC/AT, mucous membranes are moist  Resp: O2 per NC,  CT draining sanginous fluid  Ext: + lower extremity edema   Neuro:oriented x3, communicating clearly  /71  Pulse 60  Temp 97.4  F (36.3  C) (Oral)  Resp 18  Ht 1.676 m (5' 6\")  Wt 142.5 kg (314 lb 1.6 oz)  SpO2 100%  BMI 50.7 kg/m2           Data:     Lab Results   Component Value Date    A1C 9.3 07/01/2018    A1C 8.2 03/19/2018    A1C 7.3 08/09/2017    A1C 7.2 03/08/2017    A1C 7.3 12/23/2016              CBC RESULTS:   Recent Labs   Lab Test  07/07/18   0546   WBC  7.2   RBC  2.70*   HGB  7.6*   HCT  25.0*   MCV  93   MCH  28.1   MCHC  30.4*   RDW  16.1*   PLT  110*     Recent Labs   Lab Test  07/07/18   0546  07/06/18   0945   NA  150*  145*   POTASSIUM  4.6  4.3   CHLORIDE  116*  115*   CO2  25  24   ANIONGAP  9  6   GLC  129*  128*   BUN  25  22   CR  1.49*  1.38*   ANTOINETTE  9.1  8.7     Liver Function Studies -   Recent Labs   Lab Test  07/01/18   0621   PROTTOTAL  6.1*   ALBUMIN  3.1*   BILITOTAL  1.0   ALKPHOS  100   AST  20   ALT  26     Lab Results   Component Value Date    INR 1.09 07/06/2018    INR 1.30 07/03/2018    INR 1.40 07/02/2018    INR 1.52 07/02/2018    INR 1.01 06/29/2018    INR 1.12 08/08/2017    INR 1.04 05/18/2017    INR 1.06 11/18/2016    INR 1.02 10/27/2016    INR 1.19 12/19/2015    INR 1.08 05/05/2015    INR 1.4 05/04/2015    INR 0.97 01/28/2014         I spent a total of 35 minutes bedside and on the inpatient unit managing the glycemic care of Mariel Ribeiro. Over 50% of my time on the unit was spent counseling the patient  and/or coordinating care regarding glycemic management and switch to lanrus and " novolog.  See note for details.      Fabiola Horvath [facility] [phone]  Diabetes Management job code [id number]

## 2018-07-07 NOTE — PROGRESS NOTES
CARDIOTHORACIC SURGERY PROGRESS NOTE  07/07/2018      SUBJECTIVE:    Left chest tube came apart last evening, reconnected right away. Follow up CXR showed no pneumothorax. Patient had some sharp left chest wall pain with breathing yesterday and today while in bed. Pain was reproduced with palpation and had no associated SOB. Patient denies increasing SOB, cardiac pain, fevers, chills, sweats.      OBJECTIVE:   Temp:  [97.4  F (36.3  C)-98.3  F (36.8  C)] 97.5  F (36.4  C)  Heart Rate:  [] 93  Resp:  [18] 18  BP: (118-152)/(50-94) 118/63  SpO2:  [96 %-100 %] 100 %    MAPs: 85-89  Gen: A&Ox3, NAD  CV: RRR, normal S1, S2, no murmurs, rubs or gallops   Pulm: CTA, no wheezing or rhonchi  Abd: Soft, NT, ND, +BS , no BM  Ext: moderate LE edema, +1 pitting   Neuro: Nonfocal  Incision: c/d/i, no erythema, sternum stable  Tubes/drains: Dressing clean and dry,  Left pleural with serosanguinous output, no air leak. Total output 200 mL in 24 hr. Will leave chest tube to suction over night since it became disconnected and remove tomorrow. Re banded CT junctions.       I&O's:  I/O last 3 completed shifts:  In: 1730.7 [P.O.:360; I.V.:1370.7]  Out: 1620 [Urine:1250; Chest Tube:370]    Labs:   BMP    Recent Labs  Lab 07/07/18  0546 07/06/18  0945 07/05/18  0342 07/04/18  1601   * 145* 147* 149*   POTASSIUM 4.6 4.3 4.2 3.6   CHLORIDE 116* 115* 115* 119*   ANTOINETTE 9.1 8.7 8.2* 7.4*   CO2 25 24 27 24   BUN 25 22 19 16   CR 1.49* 1.38* 1.44* 1.25*   * 128* 134* 107*     CBC    Recent Labs  Lab 07/07/18  0546 07/06/18  0945 07/05/18  0342 07/04/18  1008 07/04/18  0400   WBC 7.2 8.5 7.4  --  7.6   RBC 2.70* 2.78* 2.52*  --  2.34*   HGB 7.6* 7.8* 7.1* 7.7* 6.4*   HCT 25.0* 25.4* 22.5*  --  20.0*   MCV 93 91 89  --  86   MCH 28.1 28.1 28.2  --  27.4   MCHC 30.4* 30.7* 31.6  --  32.0   RDW 16.1* 16.0* 16.3*  --  16.8*   * 89* 58*  --  66*     INR    Recent Labs  Lab 07/06/18  0904 07/03/18  0637 07/02/18  1609  18  1436   INR 1.09 1.30* 1.40* 1.52*      Hepatic Panel     Recent Labs  Lab 18  0621   AST 20   ALT 26   ALKPHOS 100   BILITOTAL 1.0   ALBUMIN 3.1*     Glucose  Recent Labs  Lab 18  1300 18  1035 18  0948 18  0839 18  0743 18  0620 18  0546  18  0945  18  0342  18  1601  18  0400  18  1221   GLC  --   --   --   --   --   --  129*  --  128*  --  134*  --  107*  --  142*  --  193*   * 130* 112* 110* 132* 124*  --   < >  --   < >  --   < >  --   < >  --   < >  --    < > = values in this interval not displayed.    Imagin/6/18  8:52 PM [id number] [facility], [place],  Radiology    [employer] Interactive Report and InfoRx   View the interactive report   PACS Images   Show images for XR Chest Port 1 View   Study Result   XR CHEST PORT 1 VW  2018 8:52 PM       HISTORY: Chest tube was accidentally disconnected. Please verify CT  location and any changes;      COMPARISON: Earlier 2018.     FINDINGS: Sternotomy wires. Neck and mediastinal surgical clips. Left  basilar chest tube, not substantially changed in positioning. No  pneumothorax. Small bilateral pleural effusions. Pulmonary vascular  congestion.         IMPRESSION:   1. No substantial change in positioning of the left basilar chest  tube.  2. Increasingly prominent pulmonary vascular congestion.     I have personally reviewed the examination and initial interpretation  and I agree with the findings.     LISANDRA MARTINEZ MD         ASSESSMENT/PLAN: Patient is a 73 year old female with a history of DM2, HTN, CKD3, HLD, COPD, fibromyalgia, anxiety, depression, lymphedema, and CAD now s/p CABG x 3v on 2018 with Dr. Mason.    Neuro:  -Acute post-op pain: IV dilaudid, tylenol,     CV:  -  mg, atorvastatin 40 mg daily, metoprolol 25 mg BID.  - HTN: 140-150, amlodipine 5 mg daily added for today, will switch to hydralazine 25 mg tid tomorrow.   - TPW removed       Resp: extubated POD 2  - IS, encourage activity  - Left Pleural chest tube with serosanguinous drainage, minimal output, no air leak. Remove tomorrow AM, tube re banded after coming apart. No pneumothorax. CXR in AM.       FEN/GI:   - 7/6 start of full liquid diet with nectar thin liquids- some coughing with food, speech to reassess  - bowel regimen, no BM-give lactulose   - Speech and dietician following, advance diet per recs    Renal:   - CKD3  - Creatinine 1.4 and trending down, at baseline 1.04, U/o adequate but still hypervolemic and pos. I/O  - Volume status: hypervolemic, dry weight ~ 302 lbs, current weight 314lbs.  - 7/7 Lasix 60 mg BID with potassium supplement, re-assess diuresis needs 7/8       ID: Completed ralph-op abx,   - WBC NL, afebrile, no signs or symptoms of infection    Heme:   - Hgb 7.4, stable.  No signs or symptoms of bleeding      Endo:   - DM2  - -150s  - 7/6 endocrine consult, lantus and SS today.       PPx:   - PPI, 5000 units SQ heparin      Anticoagulation:   - ASA therapy  - HIT negative, Plts 110, trending up. No signs or symptoms of bleeding       Dispo:   - 6C since 7/5  - Therapy recommending d/c to ARU    Staff surgeons have been informed of changes through both verbal and written communication.       Rafaela Galvez PA-C  Cardiothoracic Surgery  Pager [phone]  July 7, 2018

## 2018-07-07 NOTE — PLAN OF CARE
Problem: Patient Care Overview  Goal: Plan of Care/Patient Progress Review  RN:  1.Pt will remain hemodynamically stable.    2.Tropinin/EKG WNL.   PT 6C: Attempted to see pt for session this morning, per RN pt with new chest pain, not currently appropriate for therapies. PT will check back or reschedule.

## 2018-07-07 NOTE — PLAN OF CARE
Problem: Patient Care Overview  Goal: Plan of Care/Patient Progress Review  RN:  1.Pt will remain hemodynamically stable.    2.Tropinin/EKG WNL.   Outcome: No Change  D: Pt. Admitted 6/29 for chest pain. CABG x3v on 7/2. PMH includes DM2, HTN, CKD3, HLD, COPD, HFpEF r/t ICM, and CAD.    I: Monitored vitals and assessed pt status. Adjusted insulin drip per algorithm 4 hourly throughout shift.  CT found to be disconnected from cannister. New cannister set up and dressings reinforced with foam tape.   Changed: Blood sugar varied significantly throughout shift resulting in insulin drip values up to 13 and turned off at one point.    Running: D5 .45% NS + 20mEq K running at 10mL/hr. Insulin drip at 2units/hr.       A: A0x4. VSS, on 5L nc. BiPAP overnight at 30% fiO2. Sinus rhythm. Afebrile. Denies pain except when getting up to commode. Controlled with scheduled tylenol. One pleural chest tube with 100ml output overnight. Up with SBA. BM- this shift. Passing flatus. Minimal urine output. Full nectar thick liquid diet. Dressings CDI. Blood sugars q1h. IV insulin drip to be d/c'd this AM after lantus dose.      I/O this shift:  In: 71.67 [I.V.:71.67]  Out: -     Temp:  [97.4  F (36.3  C)-99.2  F (37.3  C)] 97.4  F (36.3  C)  Heart Rate:  [] 80  Resp:  [14-18] 18  BP: (125-152)/(55-94) 146/61  FiO2 (%):  [30 %] 30 %  SpO2:  [96 %-100 %] 100 %      P: Continue to monitor Pt status and report changes to treatment team. Follow up on discontinuing insulin drip.

## 2018-07-07 NOTE — PROVIDER NOTIFICATION
Fabiola RECINOS endocrine notified per her request that pt noon BS >200 at 1330 via text page. Page received no new orders. Continue to monitor--she may adjust evening dose of lantus.

## 2018-07-07 NOTE — PLAN OF CARE
Problem: Patient Care Overview  Goal: Plan of Care/Patient Progress Review  RN:  1.Pt will remain hemodynamically stable.    2.Tropinin/EKG WNL.   Discharge Planner SLP   Patient plan for discharge: Unknown   Current status: Pt remains at increased risk for aspiration with current level of skills.  Pt tolerated modified items with no overt s/s of aspiration.  Pt c/o items sticking at sternal notch and exhibited excessive burping with intake which is concerning for esophageal phase deficits.  At this time, recommend continuing nectar thick full liquids when alert and upright.  Pt will need to take small bites/sips, pace self, and alternate consistencies.  Should concern for esophageal phase of swallow remain, may want to consider esophagram to fully evaluate.  w  Barriers to return to prior living situation: Below baseline   Recommendations for discharge: TCU   Rationale for recommendations: Anticipate ongoing needs        Entered by: Odilia Whitley 07/07/2018 1:07 PM

## 2018-07-07 NOTE — PLAN OF CARE
"Afebrile, HR 90s-100s, BPs 130s-140s/60s-70s, O2 sats upper 90s on 2L NC. Pt denied pain today, treated with scheduled Tylenol x1 only, declined offers of further pain meds. Two mediastinal chest tubes removed by PA at midday, one pleural CT remains to -20 sxn. At 1900 patient's significant other noted that chest tube had come detached from connecter at upper end. Nurse had assessed tube and dressings 20 minutes before this and set patient sitting up at bedside to eat. Patient does not recall pulling or stepping on tubing, just noted it on the floor. Writer clamped CT above connecter just under bottom edge of dressing and paged MD. Other than c/o \"L breast pain\", pt did not appear symptomatic. Oncoming nurse attached new tubing and atrium, using two ties to secure tubing.  Oncoming nurse text-paged MD, as return call not yet received. Stat portable xray ordered.  Pt continues on insulin gtt, algorithm #4. Lantus pen just received from pharmacy; oncoming nurse will follow new Endocrine orders regarding Lantus, carb-coverage, and insulin gtt.  Gutierrez removed at 1800, first spontaneous urine output at 1945. No BM yet today.  All leg dressing changed this evening.  "

## 2018-07-08 ENCOUNTER — APPOINTMENT (OUTPATIENT)
Dept: GENERAL RADIOLOGY | Facility: CLINIC | Age: 73
DRG: 234 | End: 2018-07-08
Attending: PHYSICIAN ASSISTANT
Payer: MEDICARE

## 2018-07-08 ENCOUNTER — APPOINTMENT (OUTPATIENT)
Dept: PHYSICAL THERAPY | Facility: CLINIC | Age: 73
DRG: 234 | End: 2018-07-08
Payer: MEDICARE

## 2018-07-08 LAB
ANION GAP SERPL CALCULATED.3IONS-SCNC: 8 MMOL/L (ref 3–14)
BUN SERPL-MCNC: 28 MG/DL (ref 7–30)
CALCIUM SERPL-MCNC: 9.5 MG/DL (ref 8.5–10.1)
CHLORIDE SERPL-SCNC: 111 MMOL/L (ref 94–109)
CO2 SERPL-SCNC: 28 MMOL/L (ref 20–32)
CREAT SERPL-MCNC: 1.36 MG/DL (ref 0.52–1.04)
ERYTHROCYTE [DISTWIDTH] IN BLOOD BY AUTOMATED COUNT: 16 % (ref 10–15)
GFR SERPL CREATININE-BSD FRML MDRD: 38 ML/MIN/1.7M2
GLUCOSE BLDC GLUCOMTR-MCNC: 120 MG/DL (ref 70–99)
GLUCOSE BLDC GLUCOMTR-MCNC: 208 MG/DL (ref 70–99)
GLUCOSE BLDC GLUCOMTR-MCNC: 210 MG/DL (ref 70–99)
GLUCOSE BLDC GLUCOMTR-MCNC: 222 MG/DL (ref 70–99)
GLUCOSE BLDC GLUCOMTR-MCNC: 98 MG/DL (ref 70–99)
GLUCOSE SERPL-MCNC: 202 MG/DL (ref 70–99)
HCT VFR BLD AUTO: 27.8 % (ref 35–47)
HGB BLD-MCNC: 8.2 G/DL (ref 11.7–15.7)
MCH RBC QN AUTO: 27.5 PG (ref 26.5–33)
MCHC RBC AUTO-ENTMCNC: 29.5 G/DL (ref 31.5–36.5)
MCV RBC AUTO: 93 FL (ref 78–100)
PLATELET # BLD AUTO: 147 10E9/L (ref 150–450)
POTASSIUM SERPL-SCNC: 4.4 MMOL/L (ref 3.4–5.3)
RBC # BLD AUTO: 2.98 10E12/L (ref 3.8–5.2)
SODIUM SERPL-SCNC: 147 MMOL/L (ref 133–144)
WBC # BLD AUTO: 8.2 10E9/L (ref 4–11)

## 2018-07-08 PROCEDURE — 40000193 ZZH STATISTIC PT WARD VISIT

## 2018-07-08 PROCEDURE — 71046 X-RAY EXAM CHEST 2 VIEWS: CPT

## 2018-07-08 PROCEDURE — 25000125 ZZHC RX 250: Performed by: THORACIC SURGERY (CARDIOTHORACIC VASCULAR SURGERY)

## 2018-07-08 PROCEDURE — 94640 AIRWAY INHALATION TREATMENT: CPT | Mod: 76

## 2018-07-08 PROCEDURE — 36415 COLL VENOUS BLD VENIPUNCTURE: CPT | Performed by: NURSE PRACTITIONER

## 2018-07-08 PROCEDURE — 25000128 H RX IP 250 OP 636: Performed by: NURSE PRACTITIONER

## 2018-07-08 PROCEDURE — 00000146 ZZHCL STATISTIC GLUCOSE BY METER IP

## 2018-07-08 PROCEDURE — A9270 NON-COVERED ITEM OR SERVICE: HCPCS | Mod: GY | Performed by: PHYSICIAN ASSISTANT

## 2018-07-08 PROCEDURE — 97116 GAIT TRAINING THERAPY: CPT | Mod: GP

## 2018-07-08 PROCEDURE — 25000132 ZZH RX MED GY IP 250 OP 250 PS 637: Mod: GY | Performed by: ANESTHESIOLOGY

## 2018-07-08 PROCEDURE — 40000986 XR CHEST 1 VW

## 2018-07-08 PROCEDURE — 25000132 ZZH RX MED GY IP 250 OP 250 PS 637: Mod: GY | Performed by: NURSE PRACTITIONER

## 2018-07-08 PROCEDURE — A9270 NON-COVERED ITEM OR SERVICE: HCPCS | Mod: GY | Performed by: ANESTHESIOLOGY

## 2018-07-08 PROCEDURE — 27210208 ZZH KIT VALVED DOUBLE LUMEN

## 2018-07-08 PROCEDURE — A9270 NON-COVERED ITEM OR SERVICE: HCPCS | Mod: GY | Performed by: STUDENT IN AN ORGANIZED HEALTH CARE EDUCATION/TRAINING PROGRAM

## 2018-07-08 PROCEDURE — 71046 X-RAY EXAM CHEST 2 VIEWS: CPT | Mod: 76

## 2018-07-08 PROCEDURE — 25000132 ZZH RX MED GY IP 250 OP 250 PS 637: Mod: GY | Performed by: STUDENT IN AN ORGANIZED HEALTH CARE EDUCATION/TRAINING PROGRAM

## 2018-07-08 PROCEDURE — 25000132 ZZH RX MED GY IP 250 OP 250 PS 637: Mod: GY | Performed by: PHYSICIAN ASSISTANT

## 2018-07-08 PROCEDURE — 36569 INSJ PICC 5 YR+ W/O IMAGING: CPT

## 2018-07-08 PROCEDURE — 85027 COMPLETE CBC AUTOMATED: CPT | Performed by: NURSE PRACTITIONER

## 2018-07-08 PROCEDURE — 97530 THERAPEUTIC ACTIVITIES: CPT | Mod: GP

## 2018-07-08 PROCEDURE — 25000125 ZZHC RX 250

## 2018-07-08 PROCEDURE — 21400006 ZZH R&B CCU INTERMEDIATE UMMC

## 2018-07-08 PROCEDURE — 94660 CPAP INITIATION&MGMT: CPT

## 2018-07-08 PROCEDURE — 40000275 ZZH STATISTIC RCP TIME EA 10 MIN

## 2018-07-08 PROCEDURE — A9270 NON-COVERED ITEM OR SERVICE: HCPCS | Mod: GY | Performed by: NURSE PRACTITIONER

## 2018-07-08 PROCEDURE — 25000132 ZZH RX MED GY IP 250 OP 250 PS 637: Mod: GY | Performed by: THORACIC SURGERY (CARDIOTHORACIC VASCULAR SURGERY)

## 2018-07-08 PROCEDURE — 40000556 ZZH STATISTIC PERIPHERAL IV START W US GUIDANCE

## 2018-07-08 PROCEDURE — A9270 NON-COVERED ITEM OR SERVICE: HCPCS | Mod: GY | Performed by: THORACIC SURGERY (CARDIOTHORACIC VASCULAR SURGERY)

## 2018-07-08 PROCEDURE — 80048 BASIC METABOLIC PNL TOTAL CA: CPT | Performed by: NURSE PRACTITIONER

## 2018-07-08 PROCEDURE — 25000128 H RX IP 250 OP 636: Performed by: PHYSICIAN ASSISTANT

## 2018-07-08 PROCEDURE — 25000125 ZZHC RX 250: Performed by: PHYSICIAN ASSISTANT

## 2018-07-08 RX ORDER — HEPARIN SODIUM,PORCINE 10 UNIT/ML
2-5 VIAL (ML) INTRAVENOUS
Status: COMPLETED | OUTPATIENT
Start: 2018-07-08 | End: 2018-07-13

## 2018-07-08 RX ORDER — BUMETANIDE 0.25 MG/ML
3 INJECTION INTRAMUSCULAR; INTRAVENOUS 2 TIMES DAILY
Status: DISCONTINUED | OUTPATIENT
Start: 2018-07-08 | End: 2018-07-11

## 2018-07-08 RX ORDER — GUAIFENESIN 600 MG/1
600 TABLET, EXTENDED RELEASE ORAL 2 TIMES DAILY
Status: DISCONTINUED | OUTPATIENT
Start: 2018-07-08 | End: 2018-07-13 | Stop reason: HOSPADM

## 2018-07-08 RX ORDER — HYDROMORPHONE HYDROCHLORIDE 2 MG/1
2-4 TABLET ORAL
Status: DISCONTINUED | OUTPATIENT
Start: 2018-07-08 | End: 2018-07-13 | Stop reason: HOSPADM

## 2018-07-08 RX ORDER — BUMETANIDE 0.25 MG/ML
3 INJECTION INTRAMUSCULAR; INTRAVENOUS ONCE
Status: DISCONTINUED | OUTPATIENT
Start: 2018-07-08 | End: 2018-07-08

## 2018-07-08 RX ORDER — LIDOCAINE 40 MG/G
CREAM TOPICAL
Status: DISCONTINUED | OUTPATIENT
Start: 2018-07-08 | End: 2018-07-13 | Stop reason: HOSPADM

## 2018-07-08 RX ORDER — IPRATROPIUM BROMIDE AND ALBUTEROL SULFATE 2.5; .5 MG/3ML; MG/3ML
SOLUTION RESPIRATORY (INHALATION)
Status: DISPENSED
Start: 2018-07-08 | End: 2018-07-08

## 2018-07-08 RX ORDER — POTASSIUM CHLORIDE 1500 MG/1
60 TABLET, EXTENDED RELEASE ORAL 2 TIMES DAILY
Status: DISCONTINUED | OUTPATIENT
Start: 2018-07-08 | End: 2018-07-12

## 2018-07-08 RX ORDER — HYDRALAZINE HYDROCHLORIDE 50 MG/1
50 TABLET, FILM COATED ORAL 3 TIMES DAILY
Status: DISCONTINUED | OUTPATIENT
Start: 2018-07-08 | End: 2018-07-13

## 2018-07-08 RX ADMIN — BUMETANIDE 3 MG: 2 TABLET ORAL at 12:24

## 2018-07-08 RX ADMIN — METOPROLOL TARTRATE 25 MG: 25 TABLET, FILM COATED ORAL at 08:25

## 2018-07-08 RX ADMIN — ALBUTEROL SULFATE 2.5 MG: 2.5 SOLUTION RESPIRATORY (INHALATION) at 20:26

## 2018-07-08 RX ADMIN — HEPARIN SODIUM 5000 UNITS: 5000 INJECTION, SOLUTION INTRAVENOUS; SUBCUTANEOUS at 05:36

## 2018-07-08 RX ADMIN — SENNOSIDES AND DOCUSATE SODIUM 2 TABLET: 8.6; 5 TABLET ORAL at 20:59

## 2018-07-08 RX ADMIN — METOPROLOL TARTRATE 25 MG: 25 TABLET, FILM COATED ORAL at 21:00

## 2018-07-08 RX ADMIN — Medication 3 MG: at 22:01

## 2018-07-08 RX ADMIN — ACETAMINOPHEN 975 MG: 325 TABLET, FILM COATED ORAL at 13:48

## 2018-07-08 RX ADMIN — HEPARIN SODIUM 5000 UNITS: 5000 INJECTION, SOLUTION INTRAVENOUS; SUBCUTANEOUS at 13:48

## 2018-07-08 RX ADMIN — FUROSEMIDE 60 MG: 10 INJECTION, SOLUTION INTRAVENOUS at 08:27

## 2018-07-08 RX ADMIN — ACETAMINOPHEN 975 MG: 325 TABLET, FILM COATED ORAL at 05:36

## 2018-07-08 RX ADMIN — HEPARIN SODIUM 5000 UNITS: 5000 INJECTION, SOLUTION INTRAVENOUS; SUBCUTANEOUS at 22:01

## 2018-07-08 RX ADMIN — HYDRALAZINE HYDROCHLORIDE 25 MG: 25 TABLET ORAL at 08:24

## 2018-07-08 RX ADMIN — ASPIRIN 325 MG ORAL TABLET 325 MG: 325 PILL ORAL at 08:23

## 2018-07-08 RX ADMIN — SENNOSIDES AND DOCUSATE SODIUM 2 TABLET: 8.6; 5 TABLET ORAL at 08:24

## 2018-07-08 RX ADMIN — ESCITALOPRAM 20 MG: 20 TABLET, FILM COATED ORAL at 08:24

## 2018-07-08 RX ADMIN — HYDRALAZINE HYDROCHLORIDE 50 MG: 50 TABLET ORAL at 21:00

## 2018-07-08 RX ADMIN — HYDROMORPHONE HYDROCHLORIDE 2 MG: 2 TABLET ORAL at 10:36

## 2018-07-08 RX ADMIN — POTASSIUM CHLORIDE 30 MEQ: 750 TABLET, EXTENDED RELEASE ORAL at 08:23

## 2018-07-08 RX ADMIN — ALBUTEROL SULFATE 2.5 MG: 2.5 SOLUTION RESPIRATORY (INHALATION) at 08:41

## 2018-07-08 RX ADMIN — PANTOPRAZOLE SODIUM 40 MG: 40 TABLET, DELAYED RELEASE ORAL at 08:24

## 2018-07-08 RX ADMIN — POTASSIUM CHLORIDE 60 MEQ: 1500 TABLET, EXTENDED RELEASE ORAL at 20:56

## 2018-07-08 RX ADMIN — PREGABALIN 100 MG: 100 CAPSULE ORAL at 08:22

## 2018-07-08 RX ADMIN — ATORVASTATIN CALCIUM 40 MG: 40 TABLET, FILM COATED ORAL at 21:00

## 2018-07-08 RX ADMIN — Medication 500 MG: at 17:41

## 2018-07-08 RX ADMIN — HYDRALAZINE HYDROCHLORIDE 25 MG: 25 TABLET ORAL at 13:49

## 2018-07-08 RX ADMIN — LEVOTHYROXINE SODIUM 150 MCG: 150 TABLET ORAL at 08:24

## 2018-07-08 RX ADMIN — Medication 500 MG: at 08:23

## 2018-07-08 RX ADMIN — GUAIFENESIN 600 MG: 600 TABLET, EXTENDED RELEASE ORAL at 21:02

## 2018-07-08 RX ADMIN — POTASSIUM CHLORIDE 60 MEQ: 1500 TABLET, EXTENDED RELEASE ORAL at 12:24

## 2018-07-08 RX ADMIN — POLYETHYLENE GLYCOL 3350 17 G: 17 POWDER, FOR SOLUTION ORAL at 08:22

## 2018-07-08 RX ADMIN — BUMETANIDE 3 MG: 0.25 INJECTION INTRAMUSCULAR; INTRAVENOUS at 17:41

## 2018-07-08 RX ADMIN — PREGABALIN 100 MG: 100 CAPSULE ORAL at 21:02

## 2018-07-08 RX ADMIN — LIDOCAINE 1 PATCH: 560 PATCH PERCUTANEOUS; TOPICAL; TRANSDERMAL at 21:10

## 2018-07-08 RX ADMIN — LIDOCAINE HYDROCHLORIDE 3 ML: 10 INJECTION, SOLUTION EPIDURAL; INFILTRATION; INTRACAUDAL; PERINEURAL at 16:11

## 2018-07-08 RX ADMIN — ACETAMINOPHEN 975 MG: 325 TABLET, FILM COATED ORAL at 22:01

## 2018-07-08 RX ADMIN — ALBUTEROL SULFATE 2.5 MG: 2.5 SOLUTION RESPIRATORY (INHALATION) at 17:11

## 2018-07-08 RX ADMIN — ALBUTEROL SULFATE 2.5 MG: 2.5 SOLUTION RESPIRATORY (INHALATION) at 11:38

## 2018-07-08 RX ADMIN — GUAIFENESIN 200 MG: 100 SOLUTION ORAL at 00:06

## 2018-07-08 RX ADMIN — GUAIFENESIN 200 MG: 100 SOLUTION ORAL at 08:22

## 2018-07-08 RX ADMIN — FEBUXOSTAT 40 MG: 40 TABLET ORAL at 21:01

## 2018-07-08 ASSESSMENT — PAIN DESCRIPTION - DESCRIPTORS
DESCRIPTORS: ACHING
DESCRIPTORS: SHARP
DESCRIPTORS: ACHING

## 2018-07-08 NOTE — PROGRESS NOTES
Diabetes Consult Daily  Progress Note          Assessment/Plan:     Mariel Ribeiro is a 73 year old female Past mental history of diabetes,obesity, COPD, CAD with history of stent placement, CKD stage III, hyperlipidemia,fibromyalgia, anxiety, depression, lymphedema, S/P CABG on 07/ 02/18                       Glucoses in the 200s today, 200s-300s yesterday after transition off IV insulin.    PLAN  -Glargine increased from 40 units BID to 48 units BID.  -aspart for meals and snacks: increased from 1unit/10 grams to 1unit/3g CHO with meals and snacks  -aspart for correction increased from high intensity to very high intensity ac and hs  -monitor glucose ac, hs, and 0200    We will need to run test claims on basal insulins b/c pt says Lantus is prohibitively expensive, also on rapid acting insulins and GLP-1 agonists (liraglutide or Trulicity).    Will continue to follow     Outpatient diabetes follow up: Pt and SO would like to consider seeing endocrinologist-  number for [facility] included in AVS.  Plan discussed with patient, bedside RN, and pharmacist.           Interval History:   The last 24 hours progress and nursing notes reviewed.  Glucoses persisting in the 200s since transition off the IV insulin.  Mariel is tired.  She feels hungry but not pleased with her options on dysphagia diet (but meals still pretty high carb: 120g CHO at lunch).  SW working with Mariel and her partner to find ARU/TCU placement.    Mariel says Lantus is prohibitively expensive.  We need to run test claim on other basal insulin options (basaglar, detemir) and rapid acting insulins.  Also- pt would be a great candidate for a GLP-1 agonist (liraglutide or Trulicity given renal function).  Currently GFRS hovering in 30s, so we will not restart metformin for now:  GFR Estimate   Date Value Ref Range Status   07/08/2018 38 (L) >60 mL/min/1.7m2 Final     Comment:     Non  GFR Calc  "  07/07/2018 34 (L) >60 mL/min/1.7m2 Final     Comment:     Non  GFR Calc   07/06/2018 38 (L) >60 mL/min/1.7m2 Final     Comment:     Non  GFR Calc         PTA:  Clarified PTA regimen with Mariel today (per her memory, which is poor at times):  -NPH 42-48 units BID  -regular insulin 14 units TID  (this was started 1-2 weeks prior to admission, before that she was on metformin, Januvia and glimepiride)        Recent Labs  Lab 07/08/18  1229 07/08/18  0707 07/08/18  0447 07/08/18  0214 07/07/18  2138 07/07/18  1831 07/07/18  1648  07/07/18  0546  07/06/18  0945  07/05/18  0342  07/04/18  1601  07/04/18  0400   GLC  --   --  202*  --   --   --   --   --  129*  --  128*  --  134*  --  107*  --  142*   * 222*  --  208* 256* 342* 351*  < >  --   < >  --   < >  --   < >  --   < >  --    < > = values in this interval not displayed.            Review of Systems:   See interval hx          Medications:       Active Diet Order      Full Liquid Diet Nectar Thickened Liquids (pre-thickened or use instant food thickener)     Physical Exam:  Gen: Alert, sitting up in chair, in NAD. SO is present and supportive.  HEENT: NC/AT, mucous membranes are moist  Resp: O2 per NC.  Ext: + lower extremity edema   Neuro:oriented x3, communicating clearly, forgetful at times (SO often prompting)  /50 (BP Location: Left arm)  Pulse 60  Temp 98.6  F (37  C) (Oral)  Resp 20  Ht 1.676 m (5' 6\")  Wt 143.1 kg (315 lb 8 oz)  SpO2 98%  BMI 50.92 kg/m2           Data:     Lab Results   Component Value Date    A1C 9.3 07/01/2018    A1C 8.2 03/19/2018    A1C 7.3 08/09/2017    A1C 7.2 03/08/2017    A1C 7.3 12/23/2016            Recent Labs   Lab Test  07/08/18   0447  07/07/18   0546   NA  147*  150*   POTASSIUM  4.4  4.6   CHLORIDE  111*  116*   CO2  28  25   ANIONGAP  8  9   GLC  202*  129*   BUN  28  25   CR  1.36*  1.49*   ANTOINETTE  9.5  9.1     CBC RESULTS:   Recent Labs   Lab Test  07/08/18   0447   WBC  " 8.2   RBC  2.98*   HGB  8.2*   HCT  27.8*   MCV  93   MCH  27.5   MCHC  29.5*   RDW  16.0*   PLT  147*     Kia Marie PA-C [phone]  Diabetes Management job code 3180

## 2018-07-08 NOTE — PLAN OF CARE
Problem: Patient Care Overview  Goal: Plan of Care/Patient Progress Review  RN:  1.Pt will remain hemodynamically stable.    2.Tropinin/EKG WNL.   Outcome: Improving  D: Angina arrest POD 5 following CABG x 3 on 07/02/2018    I: Monitored vitals and assessed pt status.   SBA 1-2  Speech therapy saw her late morning--no change to diet. Continue full liquid diet.  Changed: insulin drip has been d/c. Lantus 40 units BID. CHO and sliding scale coverage. RPIV d/c. Check BS before meals and at bedtime.  Running: D5/0.45% NS @ 10 mL/hr into LPIV with no issues   PRN: none used    A: A0x4. VSS. Afebrile. Urinating adequately following lasix administration. B/P: 125/70, T: 98.2, P: 60, R: 18 O2 98% 5L NC    CT rebanded with Rafaela SCHAEFER for securement  2 previous chest tube sites cleansed with wound cleanser and dressing changed. WDL  Continues to have left pleural CT--cleansed and dressing changed.  4 graft sites on lower extremities cleansed and dressing changed. Right proximal site changed and cleansed 2x d/t shadowing.   Sternal incision cleansed.        P: Continue to monitor Pt status and report changes to CVTS team. Hgb 7.6 this AM--recheck in AM. Remind pt to call when ordered food to have BS checked. Intradry in room and pt wants placed when in bed--passed onto NOC RNs. Chest xray in AM.

## 2018-07-08 NOTE — PROGRESS NOTES
CARDIOTHORACIC SURGERY PROGRESS NOTE  07/08/2018      SUBJECTIVE:     Patient denies increasing SOB, cardiac pain, fevers, chills, sweats. +BM, no issues over night.      OBJECTIVE:   Temp:  [97.6  F (36.4  C)-98.6  F (37  C)] 98.6  F (37  C)  Heart Rate:  [78-98] 91  Resp:  [16-20] 20  BP: (120-147)/(48-70) 140/50  FiO2 (%):  [30 %] 30 %  SpO2:  [97 %-100 %] 98 %    MAPs: 85-89  Gen: A&Ox3, NAD  CV: RRR, normal S1, S2, no murmurs, rubs or gallops   Pulm: CTA, no wheezing or rhonchi  Abd: Soft, NT, ND, +BS , no BM  Ext: moderate LE edema, +1 pitting   Neuro: Nonfocal  Incision: c/d/i, no erythema, sternum stable  Tubes/drains: left pleural 200 cc last 24h, no air leaks  Removed left pleural CT without complication       I&O's:  I/O last 3 completed shifts:  In: 2640 [P.O.:2640]  Out: 2310 [Urine:2090; Chest Tube:220]    Labs:   BMP    Recent Labs  Lab 07/08/18 0447 07/07/18  0546 07/06/18  0945 07/05/18  0342   * 150* 145* 147*   POTASSIUM 4.4 4.6 4.3 4.2   CHLORIDE 111* 116* 115* 115*   ANTOINETTE 9.5 9.1 8.7 8.2*   CO2 28 25 24 27   BUN 28 25 22 19   CR 1.36* 1.49* 1.38* 1.44*   * 129* 128* 134*     CBC    Recent Labs  Lab 07/08/18 0447 07/07/18  0546 07/06/18  0945 07/05/18  0342   WBC 8.2 7.2 8.5 7.4   RBC 2.98* 2.70* 2.78* 2.52*   HGB 8.2* 7.6* 7.8* 7.1*   HCT 27.8* 25.0* 25.4* 22.5*   MCV 93 93 91 89   MCH 27.5 28.1 28.1 28.2   MCHC 29.5* 30.4* 30.7* 31.6   RDW 16.0* 16.1* 16.0* 16.3*   * 110* 89* 58*     INR    Recent Labs  Lab 07/06/18  0904 07/03/18  0637 07/02/18  1606 07/02/18  1436   INR 1.09 1.30* 1.40* 1.52*      Hepatic Panel   No lab results found in last 7 days.  Glucose  Recent Labs  Lab 07/08/18  1229 07/08/18  0707 07/08/18  0447 07/08/18  0214 07/07/18  2138 07/07/18  1831 07/07/18  1648  07/07/18  0546  07/06/18  0945  07/05/18  0342  07/04/18  1601  07/04/18  0400   GLC  --   --  202*  --   --   --   --   --  129*  --  128*  --  134*  --  107*  --  142*   * 222*  --   208* 256* 342* 351*  < >  --   < >  --   < >  --   < >  --   < >  --    < > = values in this interval not displayed.    Imagin/6/18  8:52 PM [id number] [facility], [place],  Radiology    [employer] Interactive Report and InfoRx   View the interactive report   PACS Images   Show images for XR Chest Port 1 View   Study Result   18  1:45 PM [id number] [facility] [place],  Radiology    [employer] Interactive Report and InfoRx   View the interactive report   PACS Images   Show images for XR Chest 2 Views   Study Result   Exam: XR CHEST 2 VW, 2018 2:26 PM     Indication: ct removal;      Comparison: 2018     Findings:   Left chest tube removed. No appreciable pneumothorax. Stable prominent  cardiomediastinal silhouette. Small bilateral pleural effusions.  Median sternotomy wires. Stable linear left retrocardiac opacities.         Impression: No pneumothorax following chest tube removal.     KEVIN FRIAS, DO             ASSESSMENT/PLAN: Patient is a 73 year old female with a history of DM2, HTN, CKD3, HLD, COPD, fibromyalgia, anxiety, depression, lymphedema, and CAD now s/p CABG x 3v on 2018 with Dr. Mason.    Neuro:  -Acute post-op pain: IV dilaudid, tylenol,     CV:  -  mg, atorvastatin 40 mg daily, metoprolol 25 mg BID.  - HTN: 140-150,  Increase hydralazine 50 mg tid   - TPW removed      Resp: extubated POD 2  - IS, encourage activity  - Left Pleural chest tube removed today. F/u CXR unremarkable.       FEN/GI:   -  start of full liquid diet with nectar thin liquids- some coughing with food, speech to reassess  - bowel regimen, +BM  - Speech and dietician following, advance diet per recs    Renal:   - CKD3  - Creatinine 1.4 and trending down, at baseline 1.04, U/o adequate but still hypervolemic and pos. I/O  - Volume status: hypervolemic, dry weight ~ 302 lbs, current weight 315lbs.  - change to bumex 3mg IV bid, got first dose oral since IV needed to be replaced. Also got lasix 40 IV this  AM, wan unable to get full 60IV due to bad IV, will get PICC today. Wrap legs       ID: Completed ralph-op abx,   - WBC NL, afebrile, no signs or symptoms of infection    Heme:   - Hgb 7.4, stable.  No signs or symptoms of bleeding      Endo:   - DM2  - 7/6 endocrine consulting, lantus and SS today. They are managing       PPx:   - PPI, 5000 units SQ heparin      Anticoagulation:   - ASA therapy  - HIT negative, Plts 147, trending up. No signs or symptoms of bleeding       Dispo:   - 6C since 7/5  - Therapy recommending d/c to ARU when euvolemic.     Staff surgeons have been informed of changes through both verbal and written communication.       Rafaela Galvez PA-C  Cardiothoracic Surgery  Pager [phone]  July 8, 2018

## 2018-07-08 NOTE — PLAN OF CARE
Problem: Patient Care Overview  Goal: Plan of Care/Patient Progress Review  RN:  1.Pt will remain hemodynamically stable.    2.Tropinin/EKG WNL.   PT 6C Discharge Planner PT   Patient plan for discharge: Rehab  Current status: A bit anxious with activity, does well with a slow progression.  FWW, CGA transfers, heavy mod A x2 for sit<>supine.  Amb x50ft, x30ft, x20ft FWW, CGA on 3LPM O2 with SOB as main limiter to distance.  Barriers to return to prior living situation: Requiring O2, SOB with activity, Ax2 for bed mobility  Recommendations for discharge: TCU  Rationale for recommendations: Pt appears to do best with a slower approach to mobility progression, therefore ARU environment may not be appropriate.  Pt will be home alone during the days while roommate is at work.  Does need to wean O2.       Entered by: Polly Hooks 07/08/2018 11:36 AM

## 2018-07-08 NOTE — PLAN OF CARE
Problem: Patient Care Overview  Goal: Plan of Care/Patient Progress Review  RN:  1.Pt will remain hemodynamically stable.    2.Tropinin/EKG WNL.   Outcome: No Change  D: Pt NH is a 72 y/o female admitted 6/29 for angina. PMH of DM2, HTN, CKD stage 3, HLD, COPD, fibromyalgia, anxiety, depression, lymphedema, and CAD s/p CABG x 3v on 7/2.     I: Monitored vitals and assessed pt status.   Changed: Stopped D5 .45% NS + 20mEq KCl maintenance fluid as pt not tolerating oral intake and IV failed. New PIV started with ultrasound by vascular access team. Vascular access RN recommends midline in any additional access is needed due to lack of peripheral options.       A: A0x4. Occasionally lethargic. VSS, on 5lpm nc. BiPAP overnight 30% FiO2. Lung sounds diminished bilaterally. Sinus rhythm. Afebrile. Pain controlled with scheduled tylenol.  BM (-) this shift.  Moderate urine output; urgency. Up to commode with assist of 1 staff. Full liquid diet with nectar thick liquids per speech. Sternal incision WNL. 1 chest tube to suction. 80 mL of serosang output this shift. Replaced dressing on upper right leg d/t moderate serosanguinous output.  Blood sugars ACHS.     I/O this shift:  In: 100 [P.O.:100]  Out: 330 [Urine:250; Chest Tube:80]    Temp:  [97.4  F (36.3  C)-98.2  F (36.8  C)] 97.9  F (36.6  C)  Heart Rate:  [80-98] 89  Resp:  [16-18] 16  BP: (118-146)/(48-71) 132/69  FiO2 (%):  [30 %] 30 %  SpO2:  [97 %-100 %] 97 %      P: Continue to monitor Pt status and report changes to treatment team. Tentatively planning on removing CT today, 7/8.

## 2018-07-08 NOTE — PROGRESS NOTES
Social Work Services Progress Note    Hospital Day: 10  Date of Initial Social Work Evaluation:  7/6/2018  Collaborated with:  Patient, Roommate Odalis, Bedside Team, EMR    Data:  74 yo female followed by CVTS working on discharge planning with SW    Intervention:  Pt bedside RN paged SW to meet with family for discharge questions. SW met with pt and roommate Odalis during meal time. Odalis presented folder with cardiac care resources and medicare list of SNF options. Pt did not recall previous conversation summarized in SW note 7/6. ARU possibility had been discussed with most recent PT not recommending TCU. SW described both placements and shard that pt had been initially referred to [facility] but if TCU recommendation comes up, plan could include facility choice. Odalis requested unit SW follow up on Monday with pt in room to discuss discharge planning based on pt need at that time.    Assessment:  See RN, OT/PT, and medical team notes    Plan:    Anticipated Disposition:  Facility:  ARU vs TCU    Barriers to d/c plan: medical stability, facility determination    Follow Up:  SW will continue to follow for discharge planning    Mandy FALL  Weekend SW  Pager: [phone]  On Call Pager: [phone] 4pm - Midnight

## 2018-07-08 NOTE — PLAN OF CARE
Problem: Patient Care Overview  Goal: Plan of Care/Patient Progress Review  RN:  1.Pt will remain hemodynamically stable.    2.Tropinin/EKG WNL.   OT: Pt going w/ transport when OT attempted at set tech time, OT will check back in PM as available and appropriate.

## 2018-07-09 ENCOUNTER — APPOINTMENT (OUTPATIENT)
Dept: PHYSICAL THERAPY | Facility: CLINIC | Age: 73
DRG: 234 | End: 2018-07-09
Payer: MEDICARE

## 2018-07-09 ENCOUNTER — APPOINTMENT (OUTPATIENT)
Dept: OCCUPATIONAL THERAPY | Facility: CLINIC | Age: 73
DRG: 234 | End: 2018-07-09
Payer: MEDICARE

## 2018-07-09 ENCOUNTER — APPOINTMENT (OUTPATIENT)
Dept: SPEECH THERAPY | Facility: CLINIC | Age: 73
DRG: 234 | End: 2018-07-09
Payer: MEDICARE

## 2018-07-09 LAB
ANION GAP SERPL CALCULATED.3IONS-SCNC: 8 MMOL/L (ref 3–14)
BACTERIA SPEC CULT: NO GROWTH
BACTERIA SPEC CULT: NO GROWTH
BUN SERPL-MCNC: 34 MG/DL (ref 7–30)
CALCIUM SERPL-MCNC: 9.6 MG/DL (ref 8.5–10.1)
CHLORIDE SERPL-SCNC: 109 MMOL/L (ref 94–109)
CO2 SERPL-SCNC: 29 MMOL/L (ref 20–32)
CREAT SERPL-MCNC: 1.44 MG/DL (ref 0.52–1.04)
ERYTHROCYTE [DISTWIDTH] IN BLOOD BY AUTOMATED COUNT: 16.2 % (ref 10–15)
GFR SERPL CREATININE-BSD FRML MDRD: 36 ML/MIN/1.7M2
GLUCOSE BLDC GLUCOMTR-MCNC: 100 MG/DL (ref 70–99)
GLUCOSE BLDC GLUCOMTR-MCNC: 116 MG/DL (ref 70–99)
GLUCOSE BLDC GLUCOMTR-MCNC: 198 MG/DL (ref 70–99)
GLUCOSE BLDC GLUCOMTR-MCNC: 212 MG/DL (ref 70–99)
GLUCOSE BLDC GLUCOMTR-MCNC: 71 MG/DL (ref 70–99)
GLUCOSE BLDC GLUCOMTR-MCNC: 74 MG/DL (ref 70–99)
GLUCOSE BLDC GLUCOMTR-MCNC: 97 MG/DL (ref 70–99)
GLUCOSE SERPL-MCNC: 78 MG/DL (ref 70–99)
HCT VFR BLD AUTO: 25.5 % (ref 35–47)
HGB BLD-MCNC: 7.5 G/DL (ref 11.7–15.7)
INTERPRETATION ECG - MUSE: NORMAL
Lab: NORMAL
Lab: NORMAL
MCH RBC QN AUTO: 27.7 PG (ref 26.5–33)
MCHC RBC AUTO-ENTMCNC: 29.4 G/DL (ref 31.5–36.5)
MCV RBC AUTO: 94 FL (ref 78–100)
PLATELET # BLD AUTO: 174 10E9/L (ref 150–450)
POTASSIUM SERPL-SCNC: 4.5 MMOL/L (ref 3.4–5.3)
RBC # BLD AUTO: 2.71 10E12/L (ref 3.8–5.2)
SODIUM SERPL-SCNC: 147 MMOL/L (ref 133–144)
SPECIMEN SOURCE: NORMAL
SPECIMEN SOURCE: NORMAL
WBC # BLD AUTO: 11.3 10E9/L (ref 4–11)

## 2018-07-09 PROCEDURE — 40000275 ZZH STATISTIC RCP TIME EA 10 MIN

## 2018-07-09 PROCEDURE — 36592 COLLECT BLOOD FROM PICC: CPT | Performed by: NURSE PRACTITIONER

## 2018-07-09 PROCEDURE — A9270 NON-COVERED ITEM OR SERVICE: HCPCS | Mod: GY | Performed by: PHYSICIAN ASSISTANT

## 2018-07-09 PROCEDURE — 97140 MANUAL THERAPY 1/> REGIONS: CPT | Mod: GO

## 2018-07-09 PROCEDURE — 25000132 ZZH RX MED GY IP 250 OP 250 PS 637: Mod: GY | Performed by: STUDENT IN AN ORGANIZED HEALTH CARE EDUCATION/TRAINING PROGRAM

## 2018-07-09 PROCEDURE — 97535 SELF CARE MNGMENT TRAINING: CPT | Mod: GO

## 2018-07-09 PROCEDURE — A9270 NON-COVERED ITEM OR SERVICE: HCPCS | Mod: GY | Performed by: STUDENT IN AN ORGANIZED HEALTH CARE EDUCATION/TRAINING PROGRAM

## 2018-07-09 PROCEDURE — 40000225 ZZH STATISTIC SLP WARD VISIT: Performed by: SPEECH-LANGUAGE PATHOLOGIST

## 2018-07-09 PROCEDURE — 25000132 ZZH RX MED GY IP 250 OP 250 PS 637: Mod: GY | Performed by: PHYSICIAN ASSISTANT

## 2018-07-09 PROCEDURE — 40000133 ZZH STATISTIC OT WARD VISIT

## 2018-07-09 PROCEDURE — 85027 COMPLETE CBC AUTOMATED: CPT | Performed by: NURSE PRACTITIONER

## 2018-07-09 PROCEDURE — A9270 NON-COVERED ITEM OR SERVICE: HCPCS | Mod: GY | Performed by: NURSE PRACTITIONER

## 2018-07-09 PROCEDURE — A9270 NON-COVERED ITEM OR SERVICE: HCPCS | Mod: GY | Performed by: THORACIC SURGERY (CARDIOTHORACIC VASCULAR SURGERY)

## 2018-07-09 PROCEDURE — 25000132 ZZH RX MED GY IP 250 OP 250 PS 637: Mod: GY | Performed by: THORACIC SURGERY (CARDIOTHORACIC VASCULAR SURGERY)

## 2018-07-09 PROCEDURE — 94640 AIRWAY INHALATION TREATMENT: CPT | Mod: 76

## 2018-07-09 PROCEDURE — 25000132 ZZH RX MED GY IP 250 OP 250 PS 637: Mod: GY | Performed by: NURSE PRACTITIONER

## 2018-07-09 PROCEDURE — 92526 ORAL FUNCTION THERAPY: CPT | Mod: GN | Performed by: SPEECH-LANGUAGE PATHOLOGIST

## 2018-07-09 PROCEDURE — 80048 BASIC METABOLIC PNL TOTAL CA: CPT | Performed by: NURSE PRACTITIONER

## 2018-07-09 PROCEDURE — 25000125 ZZHC RX 250: Performed by: PHYSICIAN ASSISTANT

## 2018-07-09 PROCEDURE — 40000193 ZZH STATISTIC PT WARD VISIT

## 2018-07-09 PROCEDURE — 97530 THERAPEUTIC ACTIVITIES: CPT | Mod: GO

## 2018-07-09 PROCEDURE — 25000131 ZZH RX MED GY IP 250 OP 636 PS 637: Mod: GY | Performed by: NURSE PRACTITIONER

## 2018-07-09 PROCEDURE — 00000146 ZZHCL STATISTIC GLUCOSE BY METER IP

## 2018-07-09 PROCEDURE — 94640 AIRWAY INHALATION TREATMENT: CPT

## 2018-07-09 PROCEDURE — 97110 THERAPEUTIC EXERCISES: CPT | Mod: GP

## 2018-07-09 PROCEDURE — 97530 THERAPEUTIC ACTIVITIES: CPT | Mod: GP

## 2018-07-09 PROCEDURE — 21400006 ZZH R&B CCU INTERMEDIATE UMMC

## 2018-07-09 PROCEDURE — 94660 CPAP INITIATION&MGMT: CPT

## 2018-07-09 PROCEDURE — 25000128 H RX IP 250 OP 636: Performed by: NURSE PRACTITIONER

## 2018-07-09 PROCEDURE — 25000125 ZZHC RX 250: Performed by: THORACIC SURGERY (CARDIOTHORACIC VASCULAR SURGERY)

## 2018-07-09 RX ADMIN — BUMETANIDE 3 MG: 0.25 INJECTION INTRAMUSCULAR; INTRAVENOUS at 13:54

## 2018-07-09 RX ADMIN — HEPARIN SODIUM 5000 UNITS: 5000 INJECTION, SOLUTION INTRAVENOUS; SUBCUTANEOUS at 13:52

## 2018-07-09 RX ADMIN — ACETAMINOPHEN 975 MG: 325 TABLET, FILM COATED ORAL at 22:09

## 2018-07-09 RX ADMIN — HYDRALAZINE HYDROCHLORIDE 50 MG: 50 TABLET ORAL at 09:53

## 2018-07-09 RX ADMIN — GUAIFENESIN 600 MG: 600 TABLET, EXTENDED RELEASE ORAL at 09:39

## 2018-07-09 RX ADMIN — ASPIRIN 325 MG ORAL TABLET 325 MG: 325 PILL ORAL at 09:40

## 2018-07-09 RX ADMIN — ALBUTEROL SULFATE 2.5 MG: 2.5 SOLUTION RESPIRATORY (INHALATION) at 20:09

## 2018-07-09 RX ADMIN — Medication 500 MG: at 18:15

## 2018-07-09 RX ADMIN — PANTOPRAZOLE SODIUM 40 MG: 40 TABLET, DELAYED RELEASE ORAL at 09:45

## 2018-07-09 RX ADMIN — Medication 37.5 MG: at 09:38

## 2018-07-09 RX ADMIN — PREGABALIN 100 MG: 100 CAPSULE ORAL at 20:15

## 2018-07-09 RX ADMIN — ALBUTEROL SULFATE 2.5 MG: 2.5 SOLUTION RESPIRATORY (INHALATION) at 12:54

## 2018-07-09 RX ADMIN — Medication 3 MG: at 22:09

## 2018-07-09 RX ADMIN — LIDOCAINE 1 PATCH: 560 PATCH PERCUTANEOUS; TOPICAL; TRANSDERMAL at 20:15

## 2018-07-09 RX ADMIN — POTASSIUM CHLORIDE 60 MEQ: 1500 TABLET, EXTENDED RELEASE ORAL at 09:39

## 2018-07-09 RX ADMIN — BUMETANIDE 3 MG: 0.25 INJECTION INTRAMUSCULAR; INTRAVENOUS at 09:41

## 2018-07-09 RX ADMIN — ESCITALOPRAM 20 MG: 20 TABLET, FILM COATED ORAL at 09:38

## 2018-07-09 RX ADMIN — POTASSIUM CHLORIDE 60 MEQ: 1500 TABLET, EXTENDED RELEASE ORAL at 20:14

## 2018-07-09 RX ADMIN — SENNOSIDES AND DOCUSATE SODIUM 2 TABLET: 8.6; 5 TABLET ORAL at 09:39

## 2018-07-09 RX ADMIN — ATORVASTATIN CALCIUM 40 MG: 40 TABLET, FILM COATED ORAL at 20:14

## 2018-07-09 RX ADMIN — ACETAMINOPHEN 975 MG: 325 TABLET, FILM COATED ORAL at 06:17

## 2018-07-09 RX ADMIN — HEPARIN SODIUM 5000 UNITS: 5000 INJECTION, SOLUTION INTRAVENOUS; SUBCUTANEOUS at 06:16

## 2018-07-09 RX ADMIN — Medication 37.5 MG: at 20:14

## 2018-07-09 RX ADMIN — PREGABALIN 100 MG: 100 CAPSULE ORAL at 09:38

## 2018-07-09 RX ADMIN — HEPARIN SODIUM 5000 UNITS: 5000 INJECTION, SOLUTION INTRAVENOUS; SUBCUTANEOUS at 22:09

## 2018-07-09 RX ADMIN — Medication 500 MG: at 09:40

## 2018-07-09 RX ADMIN — HYDRALAZINE HYDROCHLORIDE 50 MG: 50 TABLET ORAL at 20:15

## 2018-07-09 RX ADMIN — ACETAMINOPHEN 975 MG: 325 TABLET, FILM COATED ORAL at 14:28

## 2018-07-09 RX ADMIN — LEVOTHYROXINE SODIUM 150 MCG: 150 TABLET ORAL at 09:38

## 2018-07-09 RX ADMIN — ALBUTEROL SULFATE 2.5 MG: 2.5 SOLUTION RESPIRATORY (INHALATION) at 16:16

## 2018-07-09 RX ADMIN — GUAIFENESIN 600 MG: 600 TABLET, EXTENDED RELEASE ORAL at 20:14

## 2018-07-09 RX ADMIN — HYDRALAZINE HYDROCHLORIDE 50 MG: 50 TABLET ORAL at 13:54

## 2018-07-09 RX ADMIN — INSULIN HUMAN 23 UNITS: 100 INJECTION, SUSPENSION SUBCUTANEOUS at 20:16

## 2018-07-09 RX ADMIN — FEBUXOSTAT 40 MG: 40 TABLET ORAL at 20:14

## 2018-07-09 ASSESSMENT — PAIN DESCRIPTION - DESCRIPTORS: DESCRIPTORS: ACHING

## 2018-07-09 NOTE — PLAN OF CARE
Problem: Patient Care Overview  Goal: Plan of Care/Patient Progress Review  RN:  1.Pt will remain hemodynamically stable.    2.Tropinin/EKG WNL.   Outcome: Improving  D: Patient admitted 6/29/18 with chest pressure found to have multivessel disease requiring CABG x3 7/2/18. PMHx DM2, HTN, CKD stage 3, HLD, COPD, anxiety, depression, lympedema    I: Monitored vitals and assessed patient status. A0x4. VSS. NSR. Afebrile. Urinating adequately. +BM. Endocrine consult due to sporadic blood sugars. Lymphedema consult placed new wraps on legs. Mobility improving, ambulates with 1-2 person assist.    Changed: Old chest tube sites and graft sites - New dressings applied    P: Continue to monitor patient status and report changes to treatment team.

## 2018-07-09 NOTE — PLAN OF CARE
Problem: Patient Care Overview  Goal: Plan of Care/Patient Progress Review  RN:  1.Pt will remain hemodynamically stable.    2.Tropinin/EKG WNL.   Discharge Planner PT   Patient plan for discharge: Rehab  Current status: Patient demos indep with transfers from higher surfaces (recliner chair and commode), needs Kath from low surfaces. Ambulates with FWW 70 feet + 80 feet and mwc follow, on 3L O2 during session. Patient progressing well with therapies.   Barriers to return to prior living situation: deconditioning  Recommendations for discharge: ARU  Rationale for recommendations: Patient with multi-disciplinary needs warranting need for ARU level rehab. Has history of falls prior to admission indicating impaired balance, has stairs at home, presenting with deconditioning and weakness s/p surgery. Pt would benefit from skilled PT to address these impairments to increase safety and indep with functional mobility        Entered by: Chantal Galvan 07/09/2018 4:18 PM

## 2018-07-09 NOTE — PLAN OF CARE
Problem: Patient Care Overview  Goal: Plan of Care/Patient Progress Review  RN:  1.Pt will remain hemodynamically stable.    2.Tropinin/EKG WNL.   Discharge Planner SLP   Patient plan for discharge: Not discussed this session  Current status: Pt demonstrating improved oral bolus control this date. Pt declined advanced trials of solids this date due to fear of food not passing and causing pain near her chest.     Pt symptoms similar to those of esophageal dysphagia; please consider esophagram or GI workup for cause of esophageal symptoms.     Recommend full liquid diet with thin liquids; sit fully upright for all PO intake, alternate solids and liquids. SLP to follow per POC.   Barriers to return to prior living situation: Medical stability, esophageal symptoms  Recommendations for discharge: TCU vs ARU    Rationale for recommendations: Pt will benefit from ongoing SLP services to address oral and pharyngeal deficits outlined above for oropharyngeal impact to dysphagia.        Entered by: Marry Macias 07/09/2018 2:58 PM

## 2018-07-09 NOTE — PLAN OF CARE
Problem: Patient Care Overview  Goal: Plan of Care/Patient Progress Review  RN:  1.Pt will remain hemodynamically stable.    2.Tropinin/EKG WNL.   Outcome: Improving  D: Pt admitted for angina. CABG x 3 on 7/2.  Hx:DM2, HTN, CKD stage 3, HLD, COPD, fibromyalgia, anxiety, depression, lymphedema, and CAD s/p CABG x 3 on 07/02/2018.        I: Monitored vitals and assessed pt status.   SBA 1  No change to diet. Continue full liquid diet. ST will see tomorrow  Changed:   LP CT removed midmorning. Tolerating well. No adverse effects. Dressing reinforced this evening.  Changes to insulin coverage. lantus increased to 48 units BID. CHO coverage 1 unit: 3g CHO. Sliding scale increased. Continue to check BS before meals and bedtime.   PIV d/c d/t sx site. Double lumen PICC inserted R arm. Flushes well. Good blood return both lumens. PICC is okay to use per vascular access.  Pt and partner requested to meet with , which occurred this shift.   Order placed for lymphadema consult.--Continue to encourage to elevate legs.  Running: none R PICC SL  PRN: 0.2 mg PO dilaudid given for pain prophylaxis prior to CT removal.     A: A0x4. VSS. Afebrile. Urinating adequately following lasix/bumex administration. B/P: 150/58, T: 98.3, P: 60, R: 18 O2: 98% 2L NC       2 previous chest tube sites cleansed with wound cleanser and dressing changed. WDL    4 graft sites on lower extremities cleansed and dressing changed. Right and left proximal site changed and cleansed 2x d/t shadowing.   Sternal incision cleansed.          P: Continue to monitor Pt status and report changes to CVTS team. Hgb 8.2 this AM--recheck in AM. Remind pt to call when ordered food to have BS checked. When ready to d/c potentially go to ARU

## 2018-07-09 NOTE — PLAN OF CARE
Problem: Patient Care Overview  Goal: Plan of Care/Patient Progress Review  RN:  1.Pt will remain hemodynamically stable.    2.Tropinin/EKG WNL.   Outcome: Improving  D: Pt admitted for angina. CABG x 3 on 7/2.  Hx:DM2, HTN, CKD stage 3, HLD, COPD, fibromyalgia, anxiety, depression, lymphedema, and CAD s/p CABG x 3 on 07/02/2018.          I: Monitored vitals and assessed pt status. Final chest tube removed yesterday. Reinforced ct dressings. Upper right leg graft site dressing changed x2 throughout shift. Blood glucose coverage changed previous shift to 48 units lantus; CHO coverage 1 unit per 3 g CHO.  Sugars borderline low this AM at 71 and 74. Applesauce and pudding given to increase to 100. Blood sugar checks at meals and bedtime. PICC sl with blood return. Incisional pain controlled with scheduled tylenol.        A: A0x4. VSS. BiPAP 30% FiO2 overnight/4l nc during day. [place]END. Afebrile. Sinus rhythm. Urinating adequately following lasix/bumex administration. Up to commode with SBA. 4 graft sites on lower extremities CDI. Sternal incision CDI.          P: Continue to monitor Pt status and report changes to CVTS team.

## 2018-07-09 NOTE — PLAN OF CARE
"Problem: Patient Care Overview  Goal: Plan of Care/Patient Progress Review  RN:  1.Pt will remain hemodynamically stable.    2.Tropinin/EKG WNL.   OT6C:  Discharge Planner OT   Patient plan for discharge: Rehab  Current status: Pt sit<>Stand from bedside chair x3 CGA + FWW. Pt ambulated in hallway x50', 30', 30', 15' SBA + FWW and w/c follow needing frequent and prolonged seated rest breaks. Pt with good adherence with sternal precautions throughout session. Pt completed toiletting using BSC, transfer to [facility] CGA with environmental set up, maxA for ralph cares. Pt SpO2% >90 on 3L of O2,  after activity 99 prior to activity, BP prior to activity: 145/58 (89), after activity: 172/68 (103)  NP present and informed of BP, pt had not yet received morning meds.   Barriers to return to prior living situation: medical status, decreased strength, endurance and poor activity tolerance, sternal precautions, O2 needs   Recommendations for discharge: ARU  Rationale for recommendations: Pt with good particpation in therapy, most limited by fatigue and SOB, pt motivated to return to PLOF requesting \"Good cardiac therapy program upon discharge\"       Entered by: Louisa Biswas 07/09/2018 9:54 AM           "

## 2018-07-09 NOTE — PROGRESS NOTES
"                     Diabetes Consult Daily  Progress Note          Assessment/Plan:                           Mariel Ribeiro is a 73 year old female Past mental history of diabetes,obesity, COPD, CAD with history of stent placement, CKD stage III, hyperlipidemia,fibromyalgia, anxiety, depression, lymphedema, S/P CABG on 07/ 02/18                        PLAN  -Glargine decreased from 48 units am to 38 units am  - Glargine 48 units am  -aspart for meals and snacks: decreased from  1unit/3g CHO to 1 unit per 4 grams of CHO with meals and snacks  -aspart for correction increased from very intensity to  high intensity ac and hs  -monitor glucose ac, hs, and 0200   Will continue to follow  -Will plan to transition to home plan ( NPH and regular) closer to discharge if not going [place] [facility] [place] [facility].      Of note: would like to continue with current diabetes regime and add a GLP-1 agonist  (liraglutide or Trulicity), but  limited due to cost of medications. Discussed with Ms. Ribeiro, she had stopped taking her insulin ~ 1 month PTA due to cost ( when she was in the \"donut hole\").  Started on NPH and regular insulin.          Outpatient diabetes follow up: Pt and SO would like to consider seeing endocrinologist- ph number for [facility] included in AVS.  Plan discussed with patient, bedside RN, and primary team   Outpatient diabetes follow up: TBD    ADDENDUM: 1325, plan for TCU, will transition to NPH bid starting with evening dose, will start with NPH 23 units ( has lantus on board) will determine NPH dose in am             Interval History:   The last 24 hours progress and nursing notes reviewed.    Blood sugars in good control and tightly controlled over night. glucose at ~ 0200, 71. Was given treatment with improvement in glucose to 100.  appetite appears to be good, ordered 116 grams of CHO, but is on a full liquid diet with nectar thickened liquids. Is  on supplements between meals ( Boost glucose " "control and sugar free gelatein)    Reviewed PTA diabetes plan. Has been using NPH vial and syringe along with Regular insulin vial and syringe. Had been on januvia, but stopped 2/2 to cost ( $400 plus per month). Was recently started on the NPH and regular insulin 2/2 cost ( each vial ~ $25 dollars). Ms. Ribeiro states she is in the \"donut hole\" approximately 6 months and would like to avoid making too many changes that she will not be able to afford for half of the year.discussed continuing her PTA plan and will transition before going to TCU, if not going to [facility]      PTA:   NPH 35/40  Regular 16 to 18 units breakfast and pre-lunch  Dinner 10-12 units    Recent Labs  Lab 07/09/18  0555 07/09/18  0412 07/09/18  0304 07/09/18  0149 07/08/18  2200 07/08/18  1744 07/08/18  1229  07/08/18  0447  07/07/18  0546  07/06/18  0945  07/05/18  0342  07/04/18  1601   GLC 78  --   --   --   --   --   --   --  202*  --  129*  --  128*  --  134*  --  107*   BGM  --  100* 74 71 98 120* 210*  < >  --   < >  --   < >  --   < >  --   < >  --    < > = values in this interval not displayed.            Review of Systems:   See interval hx          Medications:       Active Diet Order      Full Liquid Diet Nectar Thickened Liquids (pre-thickened or use instant food thickener)     Physical Exam:  Gen: Alert, resting in bed, in NAD   HEENT:  mucous membranes are moist  Resp: non-labored, supplemental oxygen  Ext: + lower extremity edema   Neuro:oriented x3, communicating clearly  /64 (BP Location: Left arm)  Pulse 60  Temp 98.1  F (36.7  C) (Axillary)  Resp 12  Ht 1.676 m (5' 6\")  Wt 141.6 kg (312 lb 3.2 oz)  SpO2 95%  BMI 50.39 kg/m2           Data:     Lab Results   Component Value Date    A1C 9.3 07/01/2018    A1C 8.2 03/19/2018    A1C 7.3 08/09/2017    A1C 7.2 03/08/2017    A1C 7.3 12/23/2016              CBC RESULTS:   Recent Labs   Lab Test  07/09/18   0555   WBC  11.3*   RBC  2.71*   HGB  7.5*   HCT  25.5* "   MCV  94   MCH  27.7   MCHC  29.4*   RDW  16.2*   PLT  174     Recent Labs   Lab Test  07/09/18   0555  07/08/18   0447   NA  147*  147*   POTASSIUM  4.5  4.4   CHLORIDE  109  111*   CO2  29  28   ANIONGAP  8  8   GLC  78  202*   BUN  34*  28   CR  1.44*  1.36*   ANTOINETTE  9.6  9.5     Liver Function Studies -   Recent Labs   Lab Test  07/01/18   0621   PROTTOTAL  6.1*   ALBUMIN  3.1*   BILITOTAL  1.0   ALKPHOS  100   AST  20   ALT  26     Lab Results   Component Value Date    INR 1.09 07/06/2018    INR 1.30 07/03/2018    INR 1.40 07/02/2018    INR 1.52 07/02/2018    INR 1.01 06/29/2018    INR 1.12 08/08/2017    INR 1.04 05/18/2017    INR 1.06 11/18/2016    INR 1.02 10/27/2016    INR 1.19 12/19/2015    INR 1.08 05/05/2015    INR 1.4 05/04/2015    INR 0.97 01/28/2014         I spent a total of 35 minutes bedside and on the inpatient unit managing the glycemic care of Mariel Ribeiro. Over 50% of my time on the unit was spent counseling the patient  and/or coordinating care regarding current diabetes plan and her plan that she was on prior to admission.  See note for details.      Naila Rose -[phone]  Diabetes Management job code [id number]

## 2018-07-09 NOTE — PROGRESS NOTES
CARDIOTHORACIC SURGERY PROGRESS NOTE  07/09/2018      SUBJECTIVE:    No acute issues over night. Patient seen resting comfortably in chair.   Patient reports sleeping well and pain is controlled.   Otherwise no acute complaints  Patient denies SOB, CP, fever, chills, sweats.     Patient being followed by speech, transitioned to a full liquid diet with nectar thin liquids.  + BM. + flatus.  Up with assist x 2.  NSR.    Plan to follow-up with social work today to discuss plan of care and transfer to ARU versus TCU.        OBJECTIVE:   Temp:  [97.4  F (36.3  C)-98.6  F (37  C)] 97.7  F (36.5  C)  Heart Rate:  [84-98] 87  Resp:  [12-20] 12  BP: (140-153)/(50-73) 145/58  FiO2 (%):  [30 %] 30 %  SpO2:  [95 %-100 %] 99 %    MAPs: 80-89  Gen: A&Ox3, NAD  CV: RRR, normal S1, S2, no murmurs, rubs or gallops   Pulm: CTA, no wheezing or rhonchi  Abd: Soft, NT, ND, +BS  Ext: moderate LE edema, +1 pitting   Neuro: Nonfocal  Incision: c/d/i, no erythema, sternum stable      I&O's:  I/O last 3 completed shifts:  In: 1120 [P.O.:1080; I.V.:40]  Out: 2800 [Urine:2800]    +3 kg since admission, trending down     Labs:   BMP    Recent Labs  Lab 07/09/18  0555 07/08/18  0447 07/07/18  0546 07/06/18  0945   * 147* 150* 145*   POTASSIUM 4.5 4.4 4.6 4.3   CHLORIDE 109 111* 116* 115*   ANTOINETTE 9.6 9.5 9.1 8.7   CO2 29 28 25 24   BUN 34* 28 25 22   CR 1.44* 1.36* 1.49* 1.38*   GLC 78 202* 129* 128*     CBC    Recent Labs  Lab 07/09/18  0555 07/08/18  0447 07/07/18  0546 07/06/18  0945   WBC 11.3* 8.2 7.2 8.5   RBC 2.71* 2.98* 2.70* 2.78*   HGB 7.5* 8.2* 7.6* 7.8*   HCT 25.5* 27.8* 25.0* 25.4*   MCV 94 93 93 91   MCH 27.7 27.5 28.1 28.1   MCHC 29.4* 29.5* 30.4* 30.7*   RDW 16.2* 16.0* 16.1* 16.0*    147* 110* 89*     INR    Recent Labs  Lab 07/06/18  0904 07/03/18  0637 07/02/18  1606 07/02/18  1436   INR 1.09 1.30* 1.40* 1.52*      Hepatic Panel   No lab results found in last 7 days.  Glucose  Recent Labs  Lab 07/09/18  0519  07/09/18  0412 07/09/18  0304 07/09/18  0149 07/08/18  2200 07/08/18  1744 07/08/18  1229  07/08/18  0447  07/07/18  0546  07/06/18  0945  07/05/18  0342  07/04/18  1601   GLC 78  --   --   --   --   --   --   --  202*  --  129*  --  128*  --  134*  --  107*   BGM  --  100* 74 71 98 120* 210*  < >  --   < >  --   < >  --   < >  --   < >  --    < > = values in this interval not displayed.    Imaging:     Recent Results (from the past 24 hour(s))   XR Chest 2 Views    Narrative    XR CHEST 2 VW  7/8/2018 9:29 AM      HISTORY: CT;     COMPARISON: 7/6/2018    FINDINGS: Left chest tube in stable position. Azygous lobe and  fissure. Sternotomy wires and mediastinal surgical clips. No  pneumothorax. Small bilateral pleural effusions. Significant  improvement in pulmonary vascular congestion and lung volumes. Left  lower lobe linear opacities.      Impression    IMPRESSION:   1. Left lower lobe linear opacities, which may represent atelectasis  or consolidation.  2. Substantial improvement in pulmonary vascular congestion, now  minimal.  3. Small bilateral pleural effusions.  4. Stable positioning of a left chest tube. No pneumothorax.    I have personally reviewed the examination and initial interpretation  and I agree with the findings.    KEVIN FRIAS, DO   XR Chest 2 Views    Narrative    Exam: XR CHEST 2 VW, 7/8/2018 2:26 PM    Indication: ct removal;     Comparison: 7/8/2018    Findings:   Left chest tube removed. No appreciable pneumothorax. Stable prominent  cardiomediastinal silhouette. Small bilateral pleural effusions.  Median sternotomy wires. Stable linear left retrocardiac opacities.      Impression    Impression: No pneumothorax following chest tube removal.    KEVIN FRIAS DO   XR Chest 1 View    Narrative    XR CHEST 1 VW  7/8/2018 3:55 PM      HISTORY: RN placed PICC - verify tip placement;     COMPARISON: Earlier 7/8/2018    FINDINGS: Azygous lobe and fissure, normal variant. Right upper  extremity  PICC tip projects over the mid SVC. Sternotomy wires and  mediastinal surgical clips. No pneumothorax. Retrocardiac opacities.  Mild enlargement of the cardiac silhouette.      Impression    IMPRESSION:   1. New right upper extremity PICC tip projects over the mid SVC.  2. Retrocardiac opacities, which favor atelectasis, given asymmetric  volume loss.  3. Mild enlargement of the cardiac silhouette.    I have personally reviewed the examination and initial interpretation  and I agree with the findings.    LISANDRA MARTINEZ MD       ASSESSMENT/PLAN: Patient is a 73 year old female with a history of DM2, HTN, CKD3, HLD, COPD, fibromyalgia, anxiety, depression, lymphedema, and CAD now s/p CABG x 3v on 7/2/2018 with Dr. Mason.    Neuro:  -Acute post-op pain: IV dilaudid, tylenol,     CV:  -  mg, atorvastatin 40 mg daily, metoprolol 25 mg BID, hydralazine 50 mg TID  - Blood pressure: 130s-150s  - TPW removed  - bilateral groin harvest sites with staples, clean, dry, and intact.  Staples x 2 weeks, remove 7/16.      Resp: extubated POD 2  - IS, encourage activity  - bilateral lower pleural effusions, aggressive pulmonary toilet       FEN/GI:   - 7/6 start of full liquid diet with nectar thin liquids, bowel regimen, + BM  - Speech and dietician following, advance diet per recs    Renal:   - CKD3  - Creatinine 1.44 and trending up, at baseline 1.04, adequate UOP  - Volume status: hypervolemic, dry weight ~ 302 lbs, current weight 312lbs.  - 7/9 Bumex 3 mg BID with potassium supplement, re-assess diuresis needs 7/10    - Lymphedema wraps       ID: Completed ralph-op abx,   - WBC 11.3, trending up.  Afebrile, no signs or symptoms of infection    Heme:   - Hgb 7.5, trending up.  No signs or symptoms of bleeding      Endo:   - DM2  - BG 70-120s   - 7/6 endocrine consult, 7/8 increased glargine, aspart for meals and snacks, and aspart correction increased to very high intensity ac and hs      PPx:   - PPI, 5000 units SQ  heparin      Anticoagulation:   - ASA therapy  - HIT negative, Plts 174, trending up. No signs or symptoms of bleeding       Dispo:   - 6C since 7/5  - Therapy recommending d/c to TCU, possibly in next 1-2 days      Staff surgeons have been informed of changes through both verbal and written communication.         Kim CORNEJO NP-C  Cardiothoracic Surgery  July 9, 2018 8:30 AM   p: [phone]

## 2018-07-09 NOTE — PROGRESS NOTES
Social Work Services Progress Note     Hospital Day: 10  Date of Initial Social Work Evaluation:  7  Collaborated with:  Pt, Odalis (caregiver), CVTS team, SNF admissions     Data: Pt is a 73 year old female being followed by SW for placement to ARU.     Intervention:  SW met with pt and called Odalis for updates on progress towards cares.  Pt and Odalis are in agreement with ARU placement.  Pt's concerns continue to be mobility that is limited by leg pain/leg edema.  Pt is scheduled for lymphedema wraps today from OT.  CVTS team updated on these concerns.    Odalis had mentioned that there was interest in reducing cost of insulin/diabetes medications as able.  Odalis asked that SW gauge pt's interest in this too as she does not want the pt to be upset by this inquiry.  SW opened conversation with the pt on starting to look at  coupons available for public persons.  Pt appears to be receptive and SW left conversation open ended with a general discussion of how to access these resources through clinic.      SW to continue following pt for discharge to ARU.     - Referrals in Process:    [facility]  [phone]     Assessment: Pt open and interested in ARU placement.  Pt concerned about edema in legs causing pain and prohibiting her from completing rehab therapies.     Plan:    Anticipated Disposition: Facility:  [facility]    Barriers to d/c plan: Medical stability, bed availability    Follow Up: SW to follow for placement to ARU     SAMANTHA Baptiste, TATIANNAW  6C Unit   Phone: [phone]  Pager: [phone]  Unit: [phone]    ___________________________________________________________________________________________________________________________________________________    Referrals Discontinued:  None    Community Case Management/Community Services in place:   None

## 2018-07-09 NOTE — PLAN OF CARE
"Problem: Patient Care Overview  Goal: Plan of Care/Patient Progress Review  RN:  1.Pt will remain hemodynamically stable.    2.Tropinin/EKG WNL.   OT/Edema/6C:  Pt continues to present with generalized edema affecting B LEs.  1+ pitting present. Primapore dressing at graft sites - R and L knee crease and R medial thigh.  L medial thigh with significant bruising, skin flaking/erosion noted at L medial thigh skin fold.  Purple bruising noted at R shin and at base of R 4th MTP.  Pt wrapped with GCB from MTPs to B knee creases using \"quick wrap technique.\" Wraps may be worn 24 hours; however, please remove if increased pain, numbness/tingling, or soiling occurs.         "

## 2018-07-10 ENCOUNTER — APPOINTMENT (OUTPATIENT)
Dept: PHYSICAL THERAPY | Facility: CLINIC | Age: 73
DRG: 234 | End: 2018-07-10
Payer: MEDICARE

## 2018-07-10 ENCOUNTER — APPOINTMENT (OUTPATIENT)
Dept: OCCUPATIONAL THERAPY | Facility: CLINIC | Age: 73
DRG: 234 | End: 2018-07-10
Payer: MEDICARE

## 2018-07-10 LAB
ANION GAP SERPL CALCULATED.3IONS-SCNC: 6 MMOL/L (ref 3–14)
BUN SERPL-MCNC: 33 MG/DL (ref 7–30)
CALCIUM SERPL-MCNC: 9.2 MG/DL (ref 8.5–10.1)
CHLORIDE SERPL-SCNC: 107 MMOL/L (ref 94–109)
CO2 SERPL-SCNC: 30 MMOL/L (ref 20–32)
CREAT SERPL-MCNC: 1.39 MG/DL (ref 0.52–1.04)
ERYTHROCYTE [DISTWIDTH] IN BLOOD BY AUTOMATED COUNT: 16.4 % (ref 10–15)
GFR SERPL CREATININE-BSD FRML MDRD: 37 ML/MIN/1.7M2
GLUCOSE BLDC GLUCOMTR-MCNC: 137 MG/DL (ref 70–99)
GLUCOSE BLDC GLUCOMTR-MCNC: 165 MG/DL (ref 70–99)
GLUCOSE BLDC GLUCOMTR-MCNC: 197 MG/DL (ref 70–99)
GLUCOSE BLDC GLUCOMTR-MCNC: 231 MG/DL (ref 70–99)
GLUCOSE BLDC GLUCOMTR-MCNC: 240 MG/DL (ref 70–99)
GLUCOSE BLDC GLUCOMTR-MCNC: 93 MG/DL (ref 70–99)
GLUCOSE SERPL-MCNC: 83 MG/DL (ref 70–99)
HCT VFR BLD AUTO: 25.6 % (ref 35–47)
HGB BLD-MCNC: 7.5 G/DL (ref 11.7–15.7)
MCH RBC QN AUTO: 27.3 PG (ref 26.5–33)
MCHC RBC AUTO-ENTMCNC: 29.3 G/DL (ref 31.5–36.5)
MCV RBC AUTO: 93 FL (ref 78–100)
PLATELET # BLD AUTO: 196 10E9/L (ref 150–450)
POTASSIUM SERPL-SCNC: 4.4 MMOL/L (ref 3.4–5.3)
RBC # BLD AUTO: 2.75 10E12/L (ref 3.8–5.2)
SODIUM SERPL-SCNC: 142 MMOL/L (ref 133–144)
WBC # BLD AUTO: 10.6 10E9/L (ref 4–11)

## 2018-07-10 PROCEDURE — 00000146 ZZHCL STATISTIC GLUCOSE BY METER IP

## 2018-07-10 PROCEDURE — A9270 NON-COVERED ITEM OR SERVICE: HCPCS | Mod: GY | Performed by: THORACIC SURGERY (CARDIOTHORACIC VASCULAR SURGERY)

## 2018-07-10 PROCEDURE — 21400006 ZZH R&B CCU INTERMEDIATE UMMC

## 2018-07-10 PROCEDURE — 25000132 ZZH RX MED GY IP 250 OP 250 PS 637: Mod: GY | Performed by: STUDENT IN AN ORGANIZED HEALTH CARE EDUCATION/TRAINING PROGRAM

## 2018-07-10 PROCEDURE — 97116 GAIT TRAINING THERAPY: CPT | Mod: GP

## 2018-07-10 PROCEDURE — 94640 AIRWAY INHALATION TREATMENT: CPT | Mod: 76

## 2018-07-10 PROCEDURE — 40000275 ZZH STATISTIC RCP TIME EA 10 MIN

## 2018-07-10 PROCEDURE — 97530 THERAPEUTIC ACTIVITIES: CPT | Mod: GO

## 2018-07-10 PROCEDURE — 25000132 ZZH RX MED GY IP 250 OP 250 PS 637: Mod: GY | Performed by: THORACIC SURGERY (CARDIOTHORACIC VASCULAR SURGERY)

## 2018-07-10 PROCEDURE — 85027 COMPLETE CBC AUTOMATED: CPT | Performed by: NURSE PRACTITIONER

## 2018-07-10 PROCEDURE — 36592 COLLECT BLOOD FROM PICC: CPT | Performed by: NURSE PRACTITIONER

## 2018-07-10 PROCEDURE — 25000132 ZZH RX MED GY IP 250 OP 250 PS 637: Mod: GY | Performed by: PHYSICIAN ASSISTANT

## 2018-07-10 PROCEDURE — A9270 NON-COVERED ITEM OR SERVICE: HCPCS | Mod: GY | Performed by: PHYSICIAN ASSISTANT

## 2018-07-10 PROCEDURE — 97140 MANUAL THERAPY 1/> REGIONS: CPT | Mod: GO

## 2018-07-10 PROCEDURE — 25000132 ZZH RX MED GY IP 250 OP 250 PS 637: Mod: GY | Performed by: NURSE PRACTITIONER

## 2018-07-10 PROCEDURE — 25000125 ZZHC RX 250: Performed by: PHYSICIAN ASSISTANT

## 2018-07-10 PROCEDURE — 97530 THERAPEUTIC ACTIVITIES: CPT | Mod: GP

## 2018-07-10 PROCEDURE — 97535 SELF CARE MNGMENT TRAINING: CPT | Mod: GO

## 2018-07-10 PROCEDURE — 97110 THERAPEUTIC EXERCISES: CPT | Mod: GP

## 2018-07-10 PROCEDURE — 25000128 H RX IP 250 OP 636: Performed by: NURSE PRACTITIONER

## 2018-07-10 PROCEDURE — 80048 BASIC METABOLIC PNL TOTAL CA: CPT | Performed by: NURSE PRACTITIONER

## 2018-07-10 PROCEDURE — 40000133 ZZH STATISTIC OT WARD VISIT

## 2018-07-10 PROCEDURE — 40000193 ZZH STATISTIC PT WARD VISIT

## 2018-07-10 PROCEDURE — A9270 NON-COVERED ITEM OR SERVICE: HCPCS | Mod: GY | Performed by: STUDENT IN AN ORGANIZED HEALTH CARE EDUCATION/TRAINING PROGRAM

## 2018-07-10 PROCEDURE — 25000125 ZZHC RX 250: Performed by: THORACIC SURGERY (CARDIOTHORACIC VASCULAR SURGERY)

## 2018-07-10 PROCEDURE — 40000802 ZZH SITE CHECK

## 2018-07-10 PROCEDURE — 94640 AIRWAY INHALATION TREATMENT: CPT

## 2018-07-10 PROCEDURE — A9270 NON-COVERED ITEM OR SERVICE: HCPCS | Mod: GY | Performed by: NURSE PRACTITIONER

## 2018-07-10 RX ORDER — ALBUTEROL SULFATE 90 UG/1
2 AEROSOL, METERED RESPIRATORY (INHALATION)
Status: DISCONTINUED | OUTPATIENT
Start: 2018-07-10 | End: 2018-07-13 | Stop reason: HOSPADM

## 2018-07-10 RX ORDER — METOLAZONE 2.5 MG/1
2.5 TABLET ORAL ONCE
Status: COMPLETED | OUTPATIENT
Start: 2018-07-10 | End: 2018-07-10

## 2018-07-10 RX ADMIN — Medication 37.5 MG: at 21:00

## 2018-07-10 RX ADMIN — ALBUTEROL SULFATE 2.5 MG: 2.5 SOLUTION RESPIRATORY (INHALATION) at 09:10

## 2018-07-10 RX ADMIN — Medication 500 MG: at 18:00

## 2018-07-10 RX ADMIN — HYDRALAZINE HYDROCHLORIDE 50 MG: 50 TABLET ORAL at 20:56

## 2018-07-10 RX ADMIN — Medication 500 MG: at 08:26

## 2018-07-10 RX ADMIN — ALBUTEROL SULFATE 2.5 MG: 2.5 SOLUTION RESPIRATORY (INHALATION) at 16:15

## 2018-07-10 RX ADMIN — LEVOTHYROXINE SODIUM 150 MCG: 150 TABLET ORAL at 08:27

## 2018-07-10 RX ADMIN — HEPARIN SODIUM 5000 UNITS: 5000 INJECTION, SOLUTION INTRAVENOUS; SUBCUTANEOUS at 15:37

## 2018-07-10 RX ADMIN — PREGABALIN 100 MG: 100 CAPSULE ORAL at 08:26

## 2018-07-10 RX ADMIN — POTASSIUM CHLORIDE 60 MEQ: 1500 TABLET, EXTENDED RELEASE ORAL at 08:26

## 2018-07-10 RX ADMIN — GUAIFENESIN 600 MG: 600 TABLET, EXTENDED RELEASE ORAL at 08:27

## 2018-07-10 RX ADMIN — ALBUTEROL SULFATE 2.5 MG: 2.5 SOLUTION RESPIRATORY (INHALATION) at 12:35

## 2018-07-10 RX ADMIN — POTASSIUM CHLORIDE 60 MEQ: 1500 TABLET, EXTENDED RELEASE ORAL at 20:56

## 2018-07-10 RX ADMIN — ALBUTEROL SULFATE 2.5 MG: 2.5 SOLUTION RESPIRATORY (INHALATION) at 20:01

## 2018-07-10 RX ADMIN — INSULIN ASPART 26 UNITS: 100 INJECTION, SOLUTION INTRAVENOUS; SUBCUTANEOUS at 20:00

## 2018-07-10 RX ADMIN — LIDOCAINE 1 PATCH: 560 PATCH PERCUTANEOUS; TOPICAL; TRANSDERMAL at 21:01

## 2018-07-10 RX ADMIN — ALBUTEROL SULFATE 2 PUFF: 90 AEROSOL, METERED RESPIRATORY (INHALATION) at 08:53

## 2018-07-10 RX ADMIN — HEPARIN SODIUM 5000 UNITS: 5000 INJECTION, SOLUTION INTRAVENOUS; SUBCUTANEOUS at 23:07

## 2018-07-10 RX ADMIN — Medication 3 MG: at 23:07

## 2018-07-10 RX ADMIN — ACETAMINOPHEN 975 MG: 325 TABLET, FILM COATED ORAL at 23:07

## 2018-07-10 RX ADMIN — HYDRALAZINE HYDROCHLORIDE 50 MG: 50 TABLET ORAL at 15:37

## 2018-07-10 RX ADMIN — GUAIFENESIN 600 MG: 600 TABLET, EXTENDED RELEASE ORAL at 20:57

## 2018-07-10 RX ADMIN — FLUTICASONE FUROATE AND VILANTEROL TRIFENATATE 1 PUFF: 100; 25 POWDER RESPIRATORY (INHALATION) at 13:00

## 2018-07-10 RX ADMIN — ACETAMINOPHEN 975 MG: 325 TABLET, FILM COATED ORAL at 15:37

## 2018-07-10 RX ADMIN — METOLAZONE 2.5 MG: 2.5 TABLET ORAL at 11:16

## 2018-07-10 RX ADMIN — Medication 37.5 MG: at 08:26

## 2018-07-10 RX ADMIN — FEBUXOSTAT 40 MG: 40 TABLET ORAL at 21:00

## 2018-07-10 RX ADMIN — ASPIRIN 325 MG ORAL TABLET 325 MG: 325 PILL ORAL at 08:26

## 2018-07-10 RX ADMIN — ACETAMINOPHEN 975 MG: 325 TABLET, FILM COATED ORAL at 05:44

## 2018-07-10 RX ADMIN — ESCITALOPRAM 20 MG: 20 TABLET, FILM COATED ORAL at 08:26

## 2018-07-10 RX ADMIN — PANTOPRAZOLE SODIUM 40 MG: 40 TABLET, DELAYED RELEASE ORAL at 08:27

## 2018-07-10 RX ADMIN — PREGABALIN 100 MG: 100 CAPSULE ORAL at 20:56

## 2018-07-10 RX ADMIN — INSULIN ASPART 17 UNITS: 100 INJECTION, SOLUTION INTRAVENOUS; SUBCUTANEOUS at 10:25

## 2018-07-10 RX ADMIN — BUMETANIDE 3 MG: 0.25 INJECTION INTRAMUSCULAR; INTRAVENOUS at 12:59

## 2018-07-10 RX ADMIN — HEPARIN SODIUM 5000 UNITS: 5000 INJECTION, SOLUTION INTRAVENOUS; SUBCUTANEOUS at 05:44

## 2018-07-10 RX ADMIN — INSULIN ASPART 23 UNITS: 100 INJECTION, SOLUTION INTRAVENOUS; SUBCUTANEOUS at 15:37

## 2018-07-10 RX ADMIN — BUMETANIDE 3 MG: 0.25 INJECTION INTRAMUSCULAR; INTRAVENOUS at 08:30

## 2018-07-10 RX ADMIN — ATORVASTATIN CALCIUM 40 MG: 40 TABLET, FILM COATED ORAL at 21:00

## 2018-07-10 RX ADMIN — HYDRALAZINE HYDROCHLORIDE 50 MG: 50 TABLET ORAL at 08:26

## 2018-07-10 RX ADMIN — CYCLOBENZAPRINE HYDROCHLORIDE 10 MG: 5 TABLET, FILM COATED ORAL at 21:05

## 2018-07-10 ASSESSMENT — PAIN DESCRIPTION - DESCRIPTORS: DESCRIPTORS: ACHING

## 2018-07-10 NOTE — PROGRESS NOTES
CVTS Daily Note  7/10/2018  Attending: Joao Mason, *    S:   No overnight events. CPAP overnight 10/5 RR 12.   Pt seen at bedside resting comfortably.    Does complain of needing rescue inhaler 2-4 times daily for wheezing, otherwise no acute complaints.      Denies F/C/Sweats.  No CP, SOB, or calf pain.    Tolerating diet, + BM.  + Flatus.    Ambulated well with assistance.  Needs to rest while walking.   Pain level tolerable. Plan as per Neuro section below.     O:   Vitals:    07/09/18 2232 07/10/18 0307 07/10/18 0313 07/10/18 0704   BP: 144/59  140/61 134/60   BP Location: Left arm  Left arm Left arm   Pulse:       Resp: 22 20 18   Temp: 98.1  F (36.7  C)   98.1  F (36.7  C)   TempSrc: Axillary   Oral   SpO2: 98%  97% 100%   Weight:  142.9 kg (315 lb)     Height:         Vitals:    07/08/18 0130 07/09/18 0146 07/10/18 0307   Weight: 143.1 kg (315 lb 8 oz) 141.6 kg (312 lb 3.2 oz) 142.9 kg (315 lb)     + 5 kg since admit and trending up      Intake/Output Summary (Last 24 hours) at 07/10/18 0934  Last data filed at 07/10/18 0800   Gross per 24 hour   Intake              860 ml   Output             2900 ml   Net            -2040 ml       MAPs: 80 - 90  Gen: AAO x 3, pleasant, NAD  CV: RRR, S1S2 normal, no murmurs, rubs, or gallops.   Pulm: no rhonchi but bilateral soft expiratory wheezes  Abd: obese, soft, non-tender, no guarding  Ext: moderate peripheral edema, 3 + pitting  Incision: clean, dry, intact, no erythema  Chest Tube sites: dressings clean and dry    Labs:  [facility]    Recent Labs  Lab 07/10/18  0603 07/09/18  0555 07/08/18  0447 07/07/18  0546    147* 147* 150*   POTASSIUM 4.4 4.5 4.4 4.6   CHLORIDE 107 109 111* 116*   ANTOINETTE 9.2 9.6 9.5 9.1   CO2 30 29 28 25   BUN 33* 34* 28 25   CR 1.39* 1.44* 1.36* 1.49*   GLC 83 78 202* 129*     CBC    Recent Labs  Lab 07/10/18  0603 07/09/18  0555 07/08/18  0447 07/07/18  0546   WBC 10.6 11.3* 8.2 7.2   RBC 2.75* 2.71* 2.98* 2.70*   HGB 7.5* 7.5* 8.2*  7.6*   HCT 25.6* 25.5* 27.8* 25.0*   MCV 93 94 93 93   MCH 27.3 27.7 27.5 28.1   MCHC 29.3* 29.4* 29.5* 30.4*   RDW 16.4* 16.2* 16.0* 16.1*    174 147* 110*     Hepatic Panel   Lab Results   Component Value Date    AST 20 07/01/2018     Lab Results   Component Value Date    ALT 26 07/01/2018     Lab Results   Component Value Date    ALBUMIN 3.1 07/01/2018     GLUCOSE:     Recent Labs  Lab 07/10/18  0707 07/10/18  0603 07/10/18  0154 07/09/18  2116 07/09/18  1801 07/09/18  1315 07/09/18  0823 07/09/18  0555  07/08/18  0447  07/07/18  0546  07/06/18  0945  07/05/18  0342   GLC  --  83  --   --   --   --   --  78  --  202*  --  129*  --  128*  --  134*   BGM 93  --  137* 198* 97 212* 116*  --   < >  --   < >  --   < >  --   < >  --    < > = values in this interval not displayed.      Imaging:  reviewed recent imaging      A/P:   Mariel Ribeiro is a 73 year old female with a history of DM2, HTN, CKD3, HLD, COPD, fibromyalgia, anxiety, depression, lymphedema, and CAD now s/p CABG x 3v on 7/2/2018 with Dr. Mason.    Neuro:  - Neuro status intact  - Acute post-op pain: tylenol     CV:  -  mg, atorvastatin 40 mg daily, metoprolol 25 mg BID, hydralazine 50 mg TID  - Blood pressure: 130s-150s, MAPs 80-90's  - Bilateral groin harvest sites with staples, clean, dry, and intact.  Staples x 2 weeks, remove 7/16.       Resp:  - Extubated POD 2  - IS, encourage activity  - CPAP overnight 10/5 RR 12.  Hx COPD and needing albuterol MDI 2-4 times per day at home for wheezing/SOB, started Breo Ellipta 7/10 for long term symptom control.   - Bilateral lower pleural effusions, aggressive pulmonary toilet       FEN/GI:   - 7/6 start of full liquid diet with nectar thin liquids, bowel regimen, + BM  - Speech and dietician following, advance diet per recs     Renal:   - CKD 3  - Creatinine 1.44 and trending up, at baseline 1.04, adequate UOP  - Volume status: hypervolemic, dry weight ~ 302 lbs, current weight 312lbs.  - 7/9  Bumex 3 mg IV BID with potassium supplement, metolazone 2.5 mg 7/10  - Lymphedema wraps       ID:   - Completed ralph-op abx  - WBC WNL, afebrile, no signs or symptoms of infection     Heme:   - Hgb stable mid 7's.  No signs or symptoms of bleeding      Endo:   - DM2  - BG 70-120s   - 7/6 endocrine consult, 7/8 increased glargine, aspart for meals and snacks, and aspart correction increased to very high intensity ac and hs      PPx:   - PPI, 5000 units SQ heparin      Anticoagulation:   - ASA therapy  - HIT negative, Plts WNL and trending up. No signs or symptoms of bleeding       Dispo:   - 6C since 7/5  - Therapy recommending d/c to TCU, possibly in next 1-2 days (needs to be on PO diuretic)       Discussed with CVTS Fellow   Staff surgeons have been informed of changes through both  verbal and written communication.      Antwan Heredia PA-C  Cardiothoracic Surgery  Pager [phone]    9:34 AM   July 10, 2018

## 2018-07-10 NOTE — PLAN OF CARE
Problem: Patient Care Overview  Goal: Plan of Care/Patient Progress Review  RN:  1.Pt will remain hemodynamically stable.    2.Tropinin/EKG WNL.   OT6C: Upon attempt pt declining participation in therapy at this time 2/2 fatigue, will check back as schedule allows

## 2018-07-10 NOTE — PLAN OF CARE
Problem: Patient Care Overview  Goal: Plan of Care/Patient Progress Review  RN:  1.Pt will remain hemodynamically stable.    2.Tropinin/EKG WNL.   SLP: Dysphagia therapy cancelled.  Pt unavailable at the time of session attempt.  ST to follow as indicated on POC.

## 2018-07-10 NOTE — PLAN OF CARE
Problem: Patient Care Overview  Goal: Plan of Care/Patient Progress Review  RN:  1.Pt will remain hemodynamically stable.    2.Tropinin/EKG WNL.   Outcome: Improving  D/I/A: Pt admitted 6/29/18 with CP found to have multivessel disease requiring CABG x3 7/2/18. PMHx DM2, HTN, CKD stage 3, HLD, COPD, anxiety, depression, lympedema.  Pt stable overnight.  A1 to commode.  +BM.  Lymph wraps in place.       P:  Continue to monitor.  ARU vs TCU upon dc.

## 2018-07-10 NOTE — PROGRESS NOTES
Diabetes Consult Daily  Progress Note          Assessment/Plan:     aMriel Ribeiro is a 73 year old female Past mental history of diabetes,obesity, COPD, CAD with history of stent placement, CKD stage III, hyperlipidemia,fibromyalgia, anxiety, depression, lymphedema, S/P CABG on 07/ 02/18                         PLAN  - NPH 35 units am ( 0900)  -NPH 35 units PM ( 2000)  -aspart for meals and snacks: decreased from  1unit per 4 CHO to 1 unit per 8 grams of CHO with meals and snacks, will increase to 1 unit per 5 grams of CHO ( starting with dinner)  -aspart for correction  high intensity ac and hs  -monitor glucose ac, hs, and 0200   Will continue to follow        Outpatient diabetes follow up: Pt and SO would like to consider seeing endocrinologist- ph number for [facility] included in AVS.  Plan discussed with patient           Interval History:   The last 24 hours progress and nursing notes reviewed.    Transitioned to NPH last night ( uses NPH at home), total basal insulin amount 40 units ( as per PTA), glucose 198 at HS, 137 at ~ 0200, and morning glucose 83/93  Appetite is great, eating > 100 grams of CHO per meal    Did a test claim on medications  Humalog is preferred over Novolog, but still  at $201.77, therefore will use regular insulin once discharge to home but twice daily versus three times daily as PTA)    PTA:   NPH 35/40  Regular 16 to 18 units breakfast and pre-lunch  Dinner 10-12 units      Recent Labs  Lab 07/10/18  0707 07/10/18  0603 07/10/18  0154 07/09/18  2116 07/09/18  1801 07/09/18  1315 07/09/18  0823 07/09/18  0555  07/08/18  0447  07/07/18  0546  07/06/18  0945  07/05/18  0342   GLC  --  83  --   --   --   --   --  78  --  202*  --  129*  --  128*  --  134*   BGM 93  --  137* 198* 97 212* 116*  --   < >  --   < >  --   < >  --   < >  --    < > = values in this interval not displayed.            Review of Systems:   See interval hx          Medications:  "      Active Diet Order      Full Liquid Diet Nectar Thickened Liquids (pre-thickened or use instant food thickener)     Physical Exam:  Gen: Alert, resting in chair, in NAD   HEENT:  mucous membranes are moist  Resp: non-labored  Ext: + lower extremity edema   Neuro:oriented x3, communicating clearly, sleepy  /60 (BP Location: Left arm)  Pulse 60  Temp 98.1  F (36.7  C) (Oral)  Resp 18  Ht 1.676 m (5' 6\")  Wt 142.9 kg (315 lb)  SpO2 100%  BMI 50.84 kg/m2           Data:     Lab Results   Component Value Date    A1C 9.3 07/01/2018    A1C 8.2 03/19/2018    A1C 7.3 08/09/2017    A1C 7.2 03/08/2017    A1C 7.3 12/23/2016              CBC RESULTS:   Recent Labs   Lab Test  07/10/18   0603   WBC  10.6   RBC  2.75*   HGB  7.5*   HCT  25.6*   MCV  93   MCH  27.3   MCHC  29.3*   RDW  16.4*   PLT  196     Recent Labs   Lab Test  07/10/18   0603  07/09/18   0555   NA  142  147*   POTASSIUM  4.4  4.5   CHLORIDE  107  109   CO2  30  29   ANIONGAP  6  8   GLC  83  78   BUN  33*  34*   CR  1.39*  1.44*   ANTOINETTE  9.2  9.6     Liver Function Studies -   Recent Labs   Lab Test  07/01/18   0621   PROTTOTAL  6.1*   ALBUMIN  3.1*   BILITOTAL  1.0   ALKPHOS  100   AST  20   ALT  26     Lab Results   Component Value Date    INR 1.09 07/06/2018    INR 1.30 07/03/2018    INR 1.40 07/02/2018    INR 1.52 07/02/2018    INR 1.01 06/29/2018    INR 1.12 08/08/2017    INR 1.04 05/18/2017    INR 1.06 11/18/2016    INR 1.02 10/27/2016    INR 1.19 12/19/2015    INR 1.08 05/05/2015    INR 1.4 05/04/2015    INR 0.97 01/28/2014             Naila Rose -[phone]  Diabetes Management job code [id number]            "

## 2018-07-10 NOTE — PLAN OF CARE
Problem: Patient Care Overview  Goal: Plan of Care/Patient Progress Review  RN:  1.Pt will remain hemodynamically stable.    2.Tropinin/EKG WNL.   OT6C:   Discharge Planner OT   Patient plan for discharge: rehab   Current status: Pt completed multiple sit<>Stand transfers from various surface heights CGA. Pt completed toiletting activity, needing maxA for ralph cares. April to don/doff pullup, TH educated pt on use of reacher and sock aide to assist with LBD with adherence to sternal precautions. Pt completed standing marches in place SBA + FWW, 30 seconds of marches with 60 second standing rest break between each activity. Pt endorsing dizziness, took BP; 157/70 (100). Pt dizziness alleviating with seated break.   Barriers to return to prior living situation: medical status, decreased strength, endurance, poor activity tolerance, sternal precautions, O2 needs   Recommendations for discharge: ARU  Rationale for recommendations: Pt motivated to progress in therapy, would benefit from intensive therapy to maximize pt progress and facilitate return to PLOF        Entered by: Louisa Biswas 07/10/2018 3:54 PM

## 2018-07-10 NOTE — PLAN OF CARE
"Problem: Patient Care Overview  Goal: Plan of Care/Patient Progress Review  RN:  1.Pt will remain hemodynamically stable.    2.Tropinin/EKG WNL.   OT/Edema/6C:  Pt tolerated edema wraps well according to 24 hour wear schedule though wraps sliding down legs.  Wraps removed.  Generalized 1+ pitting remains in B LEs from MTPs to knee creases.  Primapore dressings intact, but R groin dressing saturated - RN present for dressing change. Bruise at base of 4th MTP appears larger, now extending to 3rd and partially near 2nd MTPs.  Small purple bruise noted at thenar eminence of L foot.  GCB remains appropriate treatment for fluid movement, skin integrity, and comfort. GCB re-applied using \"quick-wrap technique\" but 50% overlay and 25% compression due to changes in skin integrity and pt tolerance.  Wraps may be worn 24 hours; however, please remove if increased pain, numbness/tingling, or soiling occurs.         "

## 2018-07-10 NOTE — PLAN OF CARE
Problem: Patient Care Overview  Goal: Plan of Care/Patient Progress Review  RN:  1.Pt will remain hemodynamically stable.    2.Tropinin/EKG WNL.   Discharge Planner PT   Patient plan for discharge: ARU  Current status: Patient progressing well with ambulation and indep with transfers, continues to need verbal cues for sternal precautions during transfers. Ambulates using 4WW 30 feet + 70 feet + 85 feet + 100 feet mod I. Limited by shortness of breath and generalized fatigue.   Barriers to return to prior living situation: stairs, medical stability, deconditioning  Recommendations for discharge: ARU  Rationale for recommendations: Patient with multi-disciplinary needs warranting need for ARU level rehab. Has history of falls prior to admission indicating impaired balance, has stairs at home, presenting with deconditioning and weakness s/p surgery. Pt would benefit from skilled PT to address these impairments to increase safety and indep with functional mobility        Entered by: Chantal Galvan 07/10/2018 11:37 AM

## 2018-07-11 ENCOUNTER — APPOINTMENT (OUTPATIENT)
Dept: SPEECH THERAPY | Facility: CLINIC | Age: 73
DRG: 234 | End: 2018-07-11
Payer: MEDICARE

## 2018-07-11 ENCOUNTER — APPOINTMENT (OUTPATIENT)
Dept: OCCUPATIONAL THERAPY | Facility: CLINIC | Age: 73
DRG: 234 | End: 2018-07-11
Payer: MEDICARE

## 2018-07-11 ENCOUNTER — APPOINTMENT (OUTPATIENT)
Dept: PHYSICAL THERAPY | Facility: CLINIC | Age: 73
DRG: 234 | End: 2018-07-11
Payer: MEDICARE

## 2018-07-11 LAB
ALBUMIN UR-MCNC: NEGATIVE MG/DL
ANION GAP SERPL CALCULATED.3IONS-SCNC: 7 MMOL/L (ref 3–14)
APPEARANCE UR: ABNORMAL
BILIRUB UR QL STRIP: NEGATIVE
BUN SERPL-MCNC: 34 MG/DL (ref 7–30)
CALCIUM SERPL-MCNC: 9.2 MG/DL (ref 8.5–10.1)
CHLORIDE SERPL-SCNC: 104 MMOL/L (ref 94–109)
CO2 SERPL-SCNC: 30 MMOL/L (ref 20–32)
COLOR UR AUTO: ABNORMAL
CREAT SERPL-MCNC: 1.31 MG/DL (ref 0.52–1.04)
ERYTHROCYTE [DISTWIDTH] IN BLOOD BY AUTOMATED COUNT: 16.4 % (ref 10–15)
GFR SERPL CREATININE-BSD FRML MDRD: 40 ML/MIN/1.7M2
GLUCOSE BLDC GLUCOMTR-MCNC: 125 MG/DL (ref 70–99)
GLUCOSE BLDC GLUCOMTR-MCNC: 134 MG/DL (ref 70–99)
GLUCOSE BLDC GLUCOMTR-MCNC: 153 MG/DL (ref 70–99)
GLUCOSE BLDC GLUCOMTR-MCNC: 191 MG/DL (ref 70–99)
GLUCOSE BLDC GLUCOMTR-MCNC: 192 MG/DL (ref 70–99)
GLUCOSE SERPL-MCNC: 142 MG/DL (ref 70–99)
GLUCOSE UR STRIP-MCNC: NEGATIVE MG/DL
HCT VFR BLD AUTO: 25.2 % (ref 35–47)
HGB BLD-MCNC: 7.6 G/DL (ref 11.7–15.7)
HGB UR QL STRIP: NEGATIVE
HYALINE CASTS #/AREA URNS LPF: 2 /LPF (ref 0–2)
KETONES UR STRIP-MCNC: NEGATIVE MG/DL
LEUKOCYTE ESTERASE UR QL STRIP: ABNORMAL
MAGNESIUM SERPL-MCNC: 1.5 MG/DL (ref 1.6–2.3)
MAGNESIUM SERPL-MCNC: 2.2 MG/DL (ref 1.6–2.3)
MCH RBC QN AUTO: 27.8 PG (ref 26.5–33)
MCHC RBC AUTO-ENTMCNC: 30.2 G/DL (ref 31.5–36.5)
MCV RBC AUTO: 92 FL (ref 78–100)
MUCOUS THREADS #/AREA URNS LPF: PRESENT /LPF
NITRATE UR QL: NEGATIVE
PH UR STRIP: 6.5 PH (ref 5–7)
PHOSPHATE SERPL-MCNC: 3.8 MG/DL (ref 2.5–4.5)
PLATELET # BLD AUTO: 205 10E9/L (ref 150–450)
POTASSIUM SERPL-SCNC: 3.7 MMOL/L (ref 3.4–5.3)
POTASSIUM SERPL-SCNC: 4 MMOL/L (ref 3.4–5.3)
RBC # BLD AUTO: 2.73 10E12/L (ref 3.8–5.2)
RBC #/AREA URNS AUTO: 3 /HPF (ref 0–2)
SODIUM SERPL-SCNC: 141 MMOL/L (ref 133–144)
SOURCE: ABNORMAL
SP GR UR STRIP: 1.01 (ref 1–1.03)
SQUAMOUS #/AREA URNS AUTO: 1 /HPF (ref 0–1)
TRANS CELLS #/AREA URNS HPF: <1 /HPF (ref 0–1)
UROBILINOGEN UR STRIP-MCNC: NORMAL MG/DL (ref 0–2)
WBC # BLD AUTO: 8.3 10E9/L (ref 4–11)
WBC #/AREA URNS AUTO: 63 /HPF (ref 0–5)
YEAST #/AREA URNS HPF: ABNORMAL /HPF

## 2018-07-11 PROCEDURE — 25000132 ZZH RX MED GY IP 250 OP 250 PS 637: Mod: GY | Performed by: STUDENT IN AN ORGANIZED HEALTH CARE EDUCATION/TRAINING PROGRAM

## 2018-07-11 PROCEDURE — 25000132 ZZH RX MED GY IP 250 OP 250 PS 637: Mod: GY | Performed by: PHYSICIAN ASSISTANT

## 2018-07-11 PROCEDURE — 25000131 ZZH RX MED GY IP 250 OP 636 PS 637: Mod: GY | Performed by: NURSE PRACTITIONER

## 2018-07-11 PROCEDURE — 97116 GAIT TRAINING THERAPY: CPT | Mod: GP

## 2018-07-11 PROCEDURE — 40000802 ZZH SITE CHECK

## 2018-07-11 PROCEDURE — 94660 CPAP INITIATION&MGMT: CPT

## 2018-07-11 PROCEDURE — 83735 ASSAY OF MAGNESIUM: CPT | Performed by: NURSE PRACTITIONER

## 2018-07-11 PROCEDURE — 97140 MANUAL THERAPY 1/> REGIONS: CPT | Mod: GO | Performed by: OCCUPATIONAL THERAPIST

## 2018-07-11 PROCEDURE — 93005 ELECTROCARDIOGRAM TRACING: CPT

## 2018-07-11 PROCEDURE — 40000193 ZZH STATISTIC PT WARD VISIT

## 2018-07-11 PROCEDURE — 97110 THERAPEUTIC EXERCISES: CPT | Mod: GP

## 2018-07-11 PROCEDURE — 40000225 ZZH STATISTIC SLP WARD VISIT: Performed by: SPEECH-LANGUAGE PATHOLOGIST

## 2018-07-11 PROCEDURE — A9270 NON-COVERED ITEM OR SERVICE: HCPCS | Mod: GY | Performed by: PHYSICIAN ASSISTANT

## 2018-07-11 PROCEDURE — 93010 ELECTROCARDIOGRAM REPORT: CPT | Performed by: INTERNAL MEDICINE

## 2018-07-11 PROCEDURE — 94640 AIRWAY INHALATION TREATMENT: CPT | Mod: 76

## 2018-07-11 PROCEDURE — 87186 SC STD MICRODIL/AGAR DIL: CPT | Performed by: THORACIC SURGERY (CARDIOTHORACIC VASCULAR SURGERY)

## 2018-07-11 PROCEDURE — 25000128 H RX IP 250 OP 636: Performed by: PHYSICIAN ASSISTANT

## 2018-07-11 PROCEDURE — 92526 ORAL FUNCTION THERAPY: CPT | Mod: GN | Performed by: SPEECH-LANGUAGE PATHOLOGIST

## 2018-07-11 PROCEDURE — A9270 NON-COVERED ITEM OR SERVICE: HCPCS | Mod: GY | Performed by: THORACIC SURGERY (CARDIOTHORACIC VASCULAR SURGERY)

## 2018-07-11 PROCEDURE — 25000128 H RX IP 250 OP 636: Performed by: NURSE PRACTITIONER

## 2018-07-11 PROCEDURE — 25000132 ZZH RX MED GY IP 250 OP 250 PS 637: Mod: GY | Performed by: NURSE PRACTITIONER

## 2018-07-11 PROCEDURE — 40000275 ZZH STATISTIC RCP TIME EA 10 MIN

## 2018-07-11 PROCEDURE — 25000125 ZZHC RX 250: Performed by: THORACIC SURGERY (CARDIOTHORACIC VASCULAR SURGERY)

## 2018-07-11 PROCEDURE — 81001 URINALYSIS AUTO W/SCOPE: CPT | Performed by: PHYSICIAN ASSISTANT

## 2018-07-11 PROCEDURE — A9270 NON-COVERED ITEM OR SERVICE: HCPCS | Mod: GY | Performed by: STUDENT IN AN ORGANIZED HEALTH CARE EDUCATION/TRAINING PROGRAM

## 2018-07-11 PROCEDURE — 97535 SELF CARE MNGMENT TRAINING: CPT | Mod: GO

## 2018-07-11 PROCEDURE — 97530 THERAPEUTIC ACTIVITIES: CPT | Mod: GO

## 2018-07-11 PROCEDURE — 36592 COLLECT BLOOD FROM PICC: CPT | Performed by: THORACIC SURGERY (CARDIOTHORACIC VASCULAR SURGERY)

## 2018-07-11 PROCEDURE — 85027 COMPLETE CBC AUTOMATED: CPT | Performed by: NURSE PRACTITIONER

## 2018-07-11 PROCEDURE — 00000146 ZZHCL STATISTIC GLUCOSE BY METER IP

## 2018-07-11 PROCEDURE — 36592 COLLECT BLOOD FROM PICC: CPT | Performed by: NURSE PRACTITIONER

## 2018-07-11 PROCEDURE — 84132 ASSAY OF SERUM POTASSIUM: CPT | Performed by: THORACIC SURGERY (CARDIOTHORACIC VASCULAR SURGERY)

## 2018-07-11 PROCEDURE — 25000125 ZZHC RX 250: Performed by: PHYSICIAN ASSISTANT

## 2018-07-11 PROCEDURE — 87086 URINE CULTURE/COLONY COUNT: CPT | Performed by: THORACIC SURGERY (CARDIOTHORACIC VASCULAR SURGERY)

## 2018-07-11 PROCEDURE — 84100 ASSAY OF PHOSPHORUS: CPT | Performed by: NURSE PRACTITIONER

## 2018-07-11 PROCEDURE — 80048 BASIC METABOLIC PNL TOTAL CA: CPT | Performed by: NURSE PRACTITIONER

## 2018-07-11 PROCEDURE — 94799 UNLISTED PULMONARY SVC/PX: CPT

## 2018-07-11 PROCEDURE — 83735 ASSAY OF MAGNESIUM: CPT | Performed by: THORACIC SURGERY (CARDIOTHORACIC VASCULAR SURGERY)

## 2018-07-11 PROCEDURE — 87088 URINE BACTERIA CULTURE: CPT | Performed by: THORACIC SURGERY (CARDIOTHORACIC VASCULAR SURGERY)

## 2018-07-11 PROCEDURE — 40000133 ZZH STATISTIC OT WARD VISIT

## 2018-07-11 PROCEDURE — A9270 NON-COVERED ITEM OR SERVICE: HCPCS | Mod: GY | Performed by: NURSE PRACTITIONER

## 2018-07-11 PROCEDURE — 25000132 ZZH RX MED GY IP 250 OP 250 PS 637: Mod: GY | Performed by: THORACIC SURGERY (CARDIOTHORACIC VASCULAR SURGERY)

## 2018-07-11 PROCEDURE — 40000133 ZZH STATISTIC OT WARD VISIT: Performed by: OCCUPATIONAL THERAPIST

## 2018-07-11 PROCEDURE — 21400006 ZZH R&B CCU INTERMEDIATE UMMC

## 2018-07-11 RX ADMIN — HYDRALAZINE HYDROCHLORIDE 50 MG: 50 TABLET ORAL at 09:09

## 2018-07-11 RX ADMIN — ALBUTEROL SULFATE 2.5 MG: 2.5 SOLUTION RESPIRATORY (INHALATION) at 20:11

## 2018-07-11 RX ADMIN — BUMETANIDE 3 MG: 2 TABLET ORAL at 16:24

## 2018-07-11 RX ADMIN — ESCITALOPRAM 20 MG: 20 TABLET, FILM COATED ORAL at 09:10

## 2018-07-11 RX ADMIN — Medication 500 MG: at 21:09

## 2018-07-11 RX ADMIN — INSULIN ASPART 15 UNITS: 100 INJECTION, SOLUTION INTRAVENOUS; SUBCUTANEOUS at 20:06

## 2018-07-11 RX ADMIN — ASPIRIN 325 MG ORAL TABLET 325 MG: 325 PILL ORAL at 09:09

## 2018-07-11 RX ADMIN — GUAIFENESIN 600 MG: 600 TABLET, EXTENDED RELEASE ORAL at 09:09

## 2018-07-11 RX ADMIN — BUMETANIDE 3 MG: 0.25 INJECTION INTRAMUSCULAR; INTRAVENOUS at 09:10

## 2018-07-11 RX ADMIN — PREGABALIN 100 MG: 100 CAPSULE ORAL at 09:10

## 2018-07-11 RX ADMIN — Medication 500 MG: at 09:09

## 2018-07-11 RX ADMIN — FEBUXOSTAT 40 MG: 40 TABLET ORAL at 21:08

## 2018-07-11 RX ADMIN — Medication 37.5 MG: at 09:08

## 2018-07-11 RX ADMIN — ACETAMINOPHEN 975 MG: 325 TABLET, FILM COATED ORAL at 23:19

## 2018-07-11 RX ADMIN — HEPARIN SODIUM 5000 UNITS: 5000 INJECTION, SOLUTION INTRAVENOUS; SUBCUTANEOUS at 14:05

## 2018-07-11 RX ADMIN — MAGNESIUM SULFATE IN WATER 4 G: 40 INJECTION, SOLUTION INTRAVENOUS at 10:40

## 2018-07-11 RX ADMIN — LEVOTHYROXINE SODIUM 150 MCG: 150 TABLET ORAL at 09:09

## 2018-07-11 RX ADMIN — Medication 37.5 MG: at 21:08

## 2018-07-11 RX ADMIN — POTASSIUM CHLORIDE 60 MEQ: 1500 TABLET, EXTENDED RELEASE ORAL at 21:13

## 2018-07-11 RX ADMIN — GUAIFENESIN 600 MG: 600 TABLET, EXTENDED RELEASE ORAL at 21:08

## 2018-07-11 RX ADMIN — ACETAMINOPHEN 975 MG: 325 TABLET, FILM COATED ORAL at 06:11

## 2018-07-11 RX ADMIN — ALBUTEROL SULFATE 2.5 MG: 2.5 SOLUTION RESPIRATORY (INHALATION) at 11:23

## 2018-07-11 RX ADMIN — HYDRALAZINE HYDROCHLORIDE 50 MG: 50 TABLET ORAL at 14:04

## 2018-07-11 RX ADMIN — HEPARIN SODIUM 5000 UNITS: 5000 INJECTION, SOLUTION INTRAVENOUS; SUBCUTANEOUS at 06:11

## 2018-07-11 RX ADMIN — INSULIN ASPART 6 UNITS: 100 INJECTION, SOLUTION INTRAVENOUS; SUBCUTANEOUS at 10:38

## 2018-07-11 RX ADMIN — PREGABALIN 100 MG: 100 CAPSULE ORAL at 21:08

## 2018-07-11 RX ADMIN — CYCLOBENZAPRINE HYDROCHLORIDE 10 MG: 5 TABLET, FILM COATED ORAL at 06:11

## 2018-07-11 RX ADMIN — LIDOCAINE 1 PATCH: 560 PATCH PERCUTANEOUS; TOPICAL; TRANSDERMAL at 21:09

## 2018-07-11 RX ADMIN — HEPARIN SODIUM 5000 UNITS: 5000 INJECTION, SOLUTION INTRAVENOUS; SUBCUTANEOUS at 23:20

## 2018-07-11 RX ADMIN — Medication 3 MG: at 23:19

## 2018-07-11 RX ADMIN — INSULIN ASPART 7 UNITS: 100 INJECTION, SOLUTION INTRAVENOUS; SUBCUTANEOUS at 14:32

## 2018-07-11 RX ADMIN — ALBUTEROL SULFATE 2 PUFF: 90 INHALANT RESPIRATORY (INHALATION) at 05:17

## 2018-07-11 RX ADMIN — HYDRALAZINE HYDROCHLORIDE 50 MG: 50 TABLET ORAL at 21:08

## 2018-07-11 RX ADMIN — POTASSIUM CHLORIDE 20 MEQ: 750 TABLET, EXTENDED RELEASE ORAL at 21:09

## 2018-07-11 RX ADMIN — ACETAMINOPHEN 975 MG: 325 TABLET, FILM COATED ORAL at 14:04

## 2018-07-11 RX ADMIN — ALBUTEROL SULFATE 2 PUFF: 90 INHALANT RESPIRATORY (INHALATION) at 02:31

## 2018-07-11 RX ADMIN — ATORVASTATIN CALCIUM 40 MG: 40 TABLET, FILM COATED ORAL at 21:09

## 2018-07-11 RX ADMIN — POTASSIUM CHLORIDE 60 MEQ: 1500 TABLET, EXTENDED RELEASE ORAL at 09:08

## 2018-07-11 RX ADMIN — FLUTICASONE FUROATE AND VILANTEROL TRIFENATATE 1 PUFF: 100; 25 POWDER RESPIRATORY (INHALATION) at 09:07

## 2018-07-11 RX ADMIN — PANTOPRAZOLE SODIUM 40 MG: 40 TABLET, DELAYED RELEASE ORAL at 09:09

## 2018-07-11 ASSESSMENT — PAIN DESCRIPTION - DESCRIPTORS: DESCRIPTORS: ACHING

## 2018-07-11 NOTE — PROGRESS NOTES
Diabetes Consult Daily  Progress Note          Assessment/Plan:     Mariel Ribeiro is a 73 year old female Past mental history of diabetes,obesity, COPD, CAD with history of stent placement, CKD stage III, hyperlipidemia,fibromyalgia, anxiety, depression, lymphedema, S/P CABG on 07/ 02/18                         PLAN  - NPH 35 units am ( 0900)  -NPH 35 units PM ( 2000)  -aspart for meals and snacks: 1 unit per 5 grams of CHO   -aspart for correction  high intensity ac and hs  -monitor glucose ac, hs, and 0200   Will continue to follow         Outpatient diabetes follow up: Pt and SO would like to consider seeing endocrinologist-  number for [facility] included in AVS.  Plan discussed with patient and nurse                                 Interval History:   The last 24 hours progress and nursing notes reviewed.  Blood sugars are improving , range 125 to 240  Diet changed, amount of CHO decreased to a consistent CHO diet  reviewed diabetes plan and rational for why regular insulin is not used while in the hospital.  Appetite is reported as good, denies nausea and or vomiting  Ms. Ribeiro reports, she will discharge to ARU      PTA:   NPH 35/40  Regular 16 to 18 units breakfast and pre-lunch  Dinner 10-12 units      Recent Labs  Lab 07/11/18  1258 07/11/18  0859 07/11/18  0611 07/11/18  0210 07/10/18  2309 07/10/18  1831 07/10/18  1423  07/10/18  0603  07/09/18  0555  07/08/18  0447  07/07/18  0546  07/06/18  0945   GLC  --   --  142*  --   --   --   --   --  83  --  78  --  202*  --  129*  --  128*   * 125*  --  191* 240* 197* 165*  < >  --   < >  --   < >  --   < >  --   < >  --    < > = values in this interval not displayed.            Review of Systems:   See interval hx          Medications:       Active Diet Order      Combination Diet Regular Diet Adult; 9188-4445 Calories: Moderate Consistent CHO (4-6 CHO units/meal)     Physical Exam:  Gen: Alert, sitting in chair,  "NAD   HEENT: mucous membranes are moist  Resp: non-labored  Ext: + lower extremity edema , with lymphedema wrapes  Neuro:oriented x3, communicating clearly  BP (!) 121/94 (BP Location: Left arm)  Pulse 81  Temp 97.9  F (36.6  C) (Oral)  Resp 18  Ht 1.676 m (5' 6\")  Wt 142.1 kg (313 lb 3.2 oz)  SpO2 95%  BMI 50.55 kg/m2           Data:     Lab Results   Component Value Date    A1C 9.3 07/01/2018    A1C 8.2 03/19/2018    A1C 7.3 08/09/2017    A1C 7.2 03/08/2017    A1C 7.3 12/23/2016              CBC RESULTS:   Recent Labs   Lab Test  07/11/18   0611   WBC  8.3   RBC  2.73*   HGB  7.6*   HCT  25.2*   MCV  92   MCH  27.8   MCHC  30.2*   RDW  16.4*   PLT  205     Recent Labs   Lab Test  07/11/18   0611  07/10/18   0603   NA  141  142   POTASSIUM  4.0  4.4   CHLORIDE  104  107   CO2  30  30   ANIONGAP  7  6   GLC  142*  83   BUN  34*  33*   CR  1.31*  1.39*   ANTOINETTE  9.2  9.2     Liver Function Studies -   Recent Labs   Lab Test  07/01/18   0621   PROTTOTAL  6.1*   ALBUMIN  3.1*   BILITOTAL  1.0   ALKPHOS  100   AST  20   ALT  26     Lab Results   Component Value Date    INR 1.09 07/06/2018    INR 1.30 07/03/2018    INR 1.40 07/02/2018    INR 1.52 07/02/2018    INR 1.01 06/29/2018    INR 1.12 08/08/2017    INR 1.04 05/18/2017    INR 1.06 11/18/2016    INR 1.02 10/27/2016    INR 1.19 12/19/2015    INR 1.08 05/05/2015    INR 1.4 05/04/2015    INR 0.97 01/28/2014       Naila Rose -[phone]  Diabetes Management job code [id number]            "

## 2018-07-11 NOTE — PROGRESS NOTES
Social Work Services Progress Note     Hospital Day: 12  Date of Initial Social Work Evaluation:  7  Collaborated with:  Pt, CVTS team, SNF admissions     Data: Pt is a 73 year old female being followed by [facility] for placement to ARU.     Intervention: Sw attempted to meet with pt today to check regarding progress towards discharge.  Pt very tired and mumbling speech.  Note given to CVTS team for concerns with drowsiness.  Plan continues to be discharge Friday pending diuresis needs.     - Referrals in Process:    Destiney TORRESU  [phone]     Assessment: Pt with garbled speech this morning due to severe drowsiness.     Plan:    Anticipated Disposition: Facility:  [facility]    Barriers to d/c plan: Medical stability, bed availability    Follow Up: SW to follow for placement to ARU     SAMANTHA Baptiste, TIMMY  6C Unit   Phone: [phone]  Pager: [phone]  Unit: [phone]    ________________________________________________________________________________________________________________________________________________    Referrals Discontinued:  None    Community Case Management/Community Services in place:   None

## 2018-07-11 NOTE — PLAN OF CARE
Problem: Patient Care Overview  Goal: Plan of Care/Patient Progress Review  RN:  1.Pt will remain hemodynamically stable.    2.Tropinin/EKG WNL.   Outcome: Improving    D: Pt admitted with CP, found to have severe 3 vessel disease, now s/p CABG x3 7/2/18. Hx DM 2, CKD, HTN, BELEN, COPD, anxiety, depression and lymphedema.     I/A: Vital signs stable, afebrile, A&Ox4, sinus rhythm. Scheduled tylenol, PRN flexeril and lidocaine patch given for lower back pain. Incisions and dressings CDI (Stapled incision on R upper leg changed d/t drainage.). BG elevated overnight. Lymphedema wraps in place. Up with SBA to commode, voiding well. Slept between cares on BiPAP/NC @ 2LPM. Full liquid diet with nectar thick liquids ordered, per speech note pt can be on thin liquids (MD paged for order change with no response).    P: D/C to TCU vs ARU once transitioned from IV diuretics to PO. Continue to monitor and notify CVTS with pertinent changes.

## 2018-07-11 NOTE — ADDENDUM NOTE
Encounter addended by: Andrea Kimball MD on: 7/11/2018 10:03 AM<BR>     Actions taken: Cosign clinical note with attestation

## 2018-07-11 NOTE — PROGRESS NOTES
CLINICAL NUTRITION SERVICES     Nutrition Prescription    Recommendations already ordered by Registered Dietitian (RD):  Modified diet order    Future/Additional Recommendations:  For all recommendations, see prior nutrition notes.  Monitor potential need to adjust timing of oral supplements.     Per Endo team, pt is requesting greater than 100 g carbohydrate at meals.     Current diet is a full liquid diet as of 7/11 am. Diet then was advanced to a regular diet.      INTERVENTIONS:  Implementation:  Collaboration with other providers, Modify composition of meals/snacks: Discussed pt with team. Modified diet order to include a Moderate Consistent Carbohydrate diet restriction.     Follow up/Monitoring:  Will continue to follow pt.    Sherrie Bonner, MS, RD, LD, [facility]   6C Pgr: [phone]

## 2018-07-11 NOTE — PLAN OF CARE
Problem: Patient Care Overview  Goal: Plan of Care/Patient Progress Review  RN:  1.Pt will remain hemodynamically stable.    2.Tropinin/EKG WNL.   Outcome: Improving  D: Admitted 6/29 with chest pain, found to have multivessel disease requiring CABG x3 7/2/18. Hx DM2, HTN, CKD 3, HLD, COPD, anxiety, depression, lymphedema.     I: Monitored vitals and assessed pt status.   Changed: -  Running:-  PRN:-    A: A0x4. VSS. NSR, HR 80's. Afebrile. C/o chills and hot flashes, managed with blankets/fan. Urinating adequately in bedside commode with SBA. Walked with PT/OT. Moderate amount serosanguineous draining from right upper leg incicion once lymph wraps were put on. Abdominal CT sites still have a small amount of drainage, dressings changed, CDI.     P: Continue to monitor Pt status and report changes to CVTS. Discharge plan to ARU or TCU later this week. Monitor for signs of infection at incsion sites. Speech has cleared pt to advance diet to a Full liquid diet with thin liquids, need an updated diet order for this, please follow up on this tomorrow.

## 2018-07-11 NOTE — PROGRESS NOTES
CVTS Daily Note  7/11/2018  Attending: Joao Mason, *    S:   States that pain is being controlled. Denies any hallucinations.   Breathing is ok. Working with IS.  Wants to be able to eat better food. Was on full liq diet.  Was able to get some sleep.  Denies any popping/clicking in her breast bone.  Working with therapies.     Denies F/C/Sweats.  No CP, SOB, or calf pain.    Tolerating diet, + BM.  + Flatus.    Plans for ARU at the time of discharge.    O:   Vitals:    07/10/18 2058 07/10/18 2300 07/11/18 0200 07/11/18 0805   BP: 141/50 140/59  139/61   BP Location: Left arm Left arm  Left arm   Pulse:    81   Resp: 18 20  20   Temp: 97.8  F (36.6  C) 98  F (36.7  C)  98  F (36.7  C)   TempSrc: Oral Axillary  Axillary   SpO2: 100% 100%  100%   Weight:   142.1 kg (313 lb 3.2 oz)    Height:         Vitals:    07/09/18 0146 07/10/18 0307 07/11/18 0200   Weight: 141.6 kg (312 lb 3.2 oz) 142.9 kg (315 lb) 142.1 kg (313 lb 3.2 oz)     + 5.1 kg since admit      Intake/Output Summary (Last 24 hours) at 07/10/18 0934  Last data filed at 07/10/18 0800   Gross per 24 hour   Intake              860 ml   Output             2900 ml   Net            -2040 ml       MAPs: 72-77  Gen: up in chair, comfortable, +O2 (2L NC)  CT removed  CV: RRR, telemetry SR 70s, +edema LE (legs wrapped)  Pulm: decreased breath sounds throughout, -750 ml  Abd: BS+, soft  Incision: sternal incision D/I   Bilateral LE incision with serous output, +staples    Labs:  BMP    Recent Labs  Lab 07/11/18  0611 07/10/18  0603 07/09/18  0555 07/08/18  0447    142 147* 147*   POTASSIUM 4.0 4.4 4.5 4.4   CHLORIDE 104 107 109 111*   ANTOINETTE 9.2 9.2 9.6 9.5   CO2 30 30 29 28   BUN 34* 33* 34* 28   CR 1.31* 1.39* 1.44* 1.36*   * 83 78 202*     CBC    Recent Labs  Lab 07/11/18  0611 07/10/18  0603 07/09/18  0555 07/08/18  0447   WBC 8.3 10.6 11.3* 8.2   RBC 2.73* 2.75* 2.71* 2.98*   HGB 7.6* 7.5* 7.5* 8.2*   HCT 25.2* 25.6* 25.5* 27.8*   MCV  92 93 94 93   MCH 27.8 27.3 27.7 27.5   MCHC 30.2* 29.3* 29.4* 29.5*   RDW 16.4* 16.4* 16.2* 16.0*    196 174 147*     Hepatic Panel   Lab Results   Component Value Date    AST 20 07/01/2018     Lab Results   Component Value Date    ALT 26 07/01/2018     Lab Results   Component Value Date    ALBUMIN 3.1 07/01/2018     GLUCOSE:     Recent Labs  Lab 07/11/18  0859 07/11/18  0611 07/11/18  0210 07/10/18  2309 07/10/18  1831 07/10/18  1423 07/10/18  1159  07/10/18  0603  07/09/18  0555  07/08/18  0447  07/07/18  0546  07/06/18  0945   GLC  --  142*  --   --   --   --   --   --  83  --  78  --  202*  --  129*  --  128*   *  --  191* 240* 197* 165* 231*  < >  --   < >  --   < >  --   < >  --   < >  --    < > = values in this interval not displayed.      Imaging:  reviewed recent imaging      A/P:   Mariel Ribeiro is a 73 year old female with a history of DM2, HTN, CKD3, HLD, COPD, fibromyalgia, anxiety, depression, lymphedema, and CAD now s/p CABG x 3 on 7/2/2018 with Dr. Mason.    Neuro:  - Neuro status intact  - Acute post-op pain: tylenol  - discontinued flexeril and changed to robaxin for muscle spasms  - PTA lexapro and pregablin    CV:  -  mg, atorvastatin 40 mg daily, metoprolol 37.5 mg BID, hydralazine 50 mg TID  - will need to be on ace inh/arb due to hx of DM  - Bilateral groin harvest sites with staples, clean, dry, and intact.  Staples x 2 weeks, remove 7/16.       Resp:  - Extubated POD 2  - IS, encourage activity  - CPAP overnight 10/5 RR 12.  Hx COPD and needing albuterol MDI 2-4 times per day at home for wheezing/SOB, started Breo Ellipta 7/10 for long term symptom control.   - Bilateral lower pleural effusions, aggressive pulmonary toilet   - nebs qid  - will start acapella valve today      FEN/GI:   - will start reg and mod carb DM diet, + BM     Renal:   - CKD 3  - Creatinine 1.31, at baseline 1.04, adequate UOP  - Volume status: hypervolemic, dry weight ~ 302 lbs, current weight  313 lbs.  - 7/9 Bumex 3 mg IV BID with potassium supplement  - Lymphedema wraps       ID:   - Completed ralph-op abx  - WBC WNL, afebrile, no signs or symptoms of infection     Heme:   - Hgb stable mid 7's.  No signs or symptoms of bleeding      Endo:   - DM2  - BG 70-120s   - 7/6 endocrine consult, 7/8 increased glargine, aspart for meals and snacks, and aspart correction increased to very high intensity ac and hs  - PTA levothyroxine    PPx:   - PPI, 5000 units SQ heparin      Anticoagulation:   - ASA therapy  - HIT negative, Plts WNL and trending up. No signs or symptoms of bleeding       Dispo:   - 6C since 7/5  - Therapy recommending d/c to ARU, looking at discharge on Friday    Encouraged IS/TCDB/amb. Sternal precautions. Ok to shower. Talked with Odalis and situation/plans. Continue to monitor.     Discussed with CVTS Fellow   Staff surgeons have been informed of changes through both  verbal and written communication.      Bruce Meeks PA-C  [phone]

## 2018-07-11 NOTE — PLAN OF CARE
Problem: Patient Care Overview  Goal: Plan of Care/Patient Progress Review  RN:  1.Pt will remain hemodynamically stable.    2.Tropinin/EKG WNL.   Outcome: Improving  D: Patient admitted 6/29/18 with chest pressure found to have multivessel disease requiring CABG x3 7/2/18. PMHx DM2, HTN, CKD stage 3, HLD, COPD, anxiety, depression, lympedema     I: Monitored vitals and assessed patient status. A0x4. VSS. NSR 80's. Afebrile. Urinating adequately in commode. +BM. Patient c/o burning with urination. UA sent. Lymphedema wraps re-done after patient showered. Standby assist with transfers and ambulation in hallway.    Changed: Graft site dressings - continue to drain serous fluid     P: Continue to monitor patient status and report changes to treatment team. Plan to discharge to ARU Friday.

## 2018-07-11 NOTE — PLAN OF CARE
Problem: Patient Care Overview  Goal: Plan of Care/Patient Progress Review  RN:  1.Pt will remain hemodynamically stable.    2.Tropinin/EKG WNL.   Discharge Planner SLP   Patient plan for discharge: Rehab   Current status: Pt seen bedside for dysphagia f/u.  Pt eager to advance diet and demonstrated functional tolerance of regular textures and thin liquids at bedside.  Recommend advance diet to regular textures and thin liquids.  Pt will need to be upright and should take small bites/sips, pace self, and alternate consistencies.    Barriers to return to prior living situation: None  Recommendations for discharge: Defer to OT/PT   Rationale for recommendations: Swallow function not impacting discharge disposition        Entered by: Odilia Whitley 07/11/2018 9:29 AM

## 2018-07-11 NOTE — PLAN OF CARE
Problem: Patient Care Overview  Goal: Plan of Care/Patient Progress Review  RN:  1.Pt will remain hemodynamically stable.    2.Tropinin/EKG WNL.   PT 6C Discharge Planner PT   Patient plan for discharge: ARU   Current status: Mobility mod(I) with 4WW.  Amb x25ft, x80ft, x80ft with seated rest breaks on 1LPM O2.   Limited by SOB and fatigue s/p surgery and chronic COPD symptoms.    Barriers to return to prior living situation: Sternal precautions, decreased activity tolerance, requiring assist for bed mobility  Recommendations for discharge: ARU  Rationale for recommendations: Pt would continue to benefit from skilled PT to progress cardiovascular endurance, progress sternal precautions per protocol, and strengthening s/p CABG surgery.  Has needs for OT, PT, SLP       Entered by: Yumiko Moreno 07/11/2018 5:08 PM

## 2018-07-11 NOTE — PLAN OF CARE
Problem: Patient Care Overview  Goal: Plan of Care/Patient Progress Review  RN:  1.Pt will remain hemodynamically stable.    2.Tropinin/EKG WNL.   OT/Edema 6C: Pt continues to be fluid overloaded, 1+ pitting in Ryan LEs, ankles and feet. Bruising appears unchanged, potentially better (more diffuse). GCBs reapplied from MTPs to knee crease. Please remove garment if it gets soiled or if pt c/o LE pain or numbness.

## 2018-07-11 NOTE — PLAN OF CARE
Problem: Patient Care Overview  Goal: Plan of Care/Patient Progress Review  RN:  1.Pt will remain hemodynamically stable.    2.Tropinin/EKG WNL.   OT6C:  Discharge Planner OT   Patient plan for discharge: ARU  Current status: Pt sit<>stand from bedside chair CGA + 4WW. Pt ambulated ~120'x2 needing x2 seated rest breaks-- improvement from previous session. [place] [facility] [place] 4WW, maxA to complete ralph cares. Pt VSS on 2L of O2 with activity, 1L at rest.   Barriers to return to prior living situation: medical status, decreased strength, endurance, O2 needs, sternal precautions   Recommendations for discharge: ARU  Rationale for recommendations: Progress pt safe and IND participation in I/ADLs and facilitate return to PLOF. Pt motivated to progress in therapy .        Entered by: Louisa Biswas 07/11/2018 12:37 PM

## 2018-07-12 ENCOUNTER — APPOINTMENT (OUTPATIENT)
Dept: SPEECH THERAPY | Facility: CLINIC | Age: 73
DRG: 234 | End: 2018-07-12
Payer: MEDICARE

## 2018-07-12 ENCOUNTER — APPOINTMENT (OUTPATIENT)
Dept: GENERAL RADIOLOGY | Facility: CLINIC | Age: 73
DRG: 234 | End: 2018-07-12
Attending: PHYSICIAN ASSISTANT
Payer: MEDICARE

## 2018-07-12 ENCOUNTER — CARE COORDINATION (OUTPATIENT)
Dept: CARDIOLOGY | Facility: CLINIC | Age: 73
End: 2018-07-12

## 2018-07-12 ENCOUNTER — APPOINTMENT (OUTPATIENT)
Dept: OCCUPATIONAL THERAPY | Facility: CLINIC | Age: 73
DRG: 234 | End: 2018-07-12
Payer: MEDICARE

## 2018-07-12 ENCOUNTER — APPOINTMENT (OUTPATIENT)
Dept: PHYSICAL THERAPY | Facility: CLINIC | Age: 73
DRG: 234 | End: 2018-07-12
Payer: MEDICARE

## 2018-07-12 LAB
ANION GAP SERPL CALCULATED.3IONS-SCNC: 7 MMOL/L (ref 3–14)
BUN SERPL-MCNC: 35 MG/DL (ref 7–30)
CALCIUM SERPL-MCNC: 9.5 MG/DL (ref 8.5–10.1)
CHLORIDE SERPL-SCNC: 100 MMOL/L (ref 94–109)
CO2 SERPL-SCNC: 32 MMOL/L (ref 20–32)
CREAT SERPL-MCNC: 1.46 MG/DL (ref 0.52–1.04)
ERYTHROCYTE [DISTWIDTH] IN BLOOD BY AUTOMATED COUNT: 16.7 % (ref 10–15)
GFR SERPL CREATININE-BSD FRML MDRD: 35 ML/MIN/1.7M2
GLUCOSE BLDC GLUCOMTR-MCNC: 118 MG/DL (ref 70–99)
GLUCOSE BLDC GLUCOMTR-MCNC: 121 MG/DL (ref 70–99)
GLUCOSE BLDC GLUCOMTR-MCNC: 136 MG/DL (ref 70–99)
GLUCOSE BLDC GLUCOMTR-MCNC: 139 MG/DL (ref 70–99)
GLUCOSE BLDC GLUCOMTR-MCNC: 177 MG/DL (ref 70–99)
GLUCOSE SERPL-MCNC: 114 MG/DL (ref 70–99)
HCT VFR BLD AUTO: 26.2 % (ref 35–47)
HGB BLD-MCNC: 7.7 G/DL (ref 11.7–15.7)
INTERPRETATION ECG - MUSE: NORMAL
MAGNESIUM SERPL-MCNC: 2 MG/DL (ref 1.6–2.3)
MCH RBC QN AUTO: 27.7 PG (ref 26.5–33)
MCHC RBC AUTO-ENTMCNC: 29.4 G/DL (ref 31.5–36.5)
MCV RBC AUTO: 94 FL (ref 78–100)
PHOSPHATE SERPL-MCNC: 4.3 MG/DL (ref 2.5–4.5)
PLATELET # BLD AUTO: 226 10E9/L (ref 150–450)
POTASSIUM SERPL-SCNC: 4.1 MMOL/L (ref 3.4–5.3)
RBC # BLD AUTO: 2.78 10E12/L (ref 3.8–5.2)
SODIUM SERPL-SCNC: 140 MMOL/L (ref 133–144)
WBC # BLD AUTO: 8.5 10E9/L (ref 4–11)

## 2018-07-12 PROCEDURE — A9270 NON-COVERED ITEM OR SERVICE: HCPCS | Mod: GY | Performed by: NURSE PRACTITIONER

## 2018-07-12 PROCEDURE — 92526 ORAL FUNCTION THERAPY: CPT | Mod: GN | Performed by: SPEECH-LANGUAGE PATHOLOGIST

## 2018-07-12 PROCEDURE — 25000132 ZZH RX MED GY IP 250 OP 250 PS 637: Mod: GY | Performed by: STUDENT IN AN ORGANIZED HEALTH CARE EDUCATION/TRAINING PROGRAM

## 2018-07-12 PROCEDURE — 21400006 ZZH R&B CCU INTERMEDIATE UMMC

## 2018-07-12 PROCEDURE — 25000132 ZZH RX MED GY IP 250 OP 250 PS 637: Mod: GY | Performed by: PHYSICIAN ASSISTANT

## 2018-07-12 PROCEDURE — A9270 NON-COVERED ITEM OR SERVICE: HCPCS | Mod: GY | Performed by: STUDENT IN AN ORGANIZED HEALTH CARE EDUCATION/TRAINING PROGRAM

## 2018-07-12 PROCEDURE — 84100 ASSAY OF PHOSPHORUS: CPT | Performed by: NURSE PRACTITIONER

## 2018-07-12 PROCEDURE — 85027 COMPLETE CBC AUTOMATED: CPT | Performed by: NURSE PRACTITIONER

## 2018-07-12 PROCEDURE — A9270 NON-COVERED ITEM OR SERVICE: HCPCS | Mod: GY | Performed by: PHYSICIAN ASSISTANT

## 2018-07-12 PROCEDURE — 00000146 ZZHCL STATISTIC GLUCOSE BY METER IP

## 2018-07-12 PROCEDURE — 40000225 ZZH STATISTIC SLP WARD VISIT: Performed by: SPEECH-LANGUAGE PATHOLOGIST

## 2018-07-12 PROCEDURE — 80048 BASIC METABOLIC PNL TOTAL CA: CPT | Performed by: NURSE PRACTITIONER

## 2018-07-12 PROCEDURE — 97116 GAIT TRAINING THERAPY: CPT | Mod: GP

## 2018-07-12 PROCEDURE — 40000133 ZZH STATISTIC OT WARD VISIT: Performed by: OCCUPATIONAL THERAPIST

## 2018-07-12 PROCEDURE — 25000128 H RX IP 250 OP 636: Performed by: PHYSICIAN ASSISTANT

## 2018-07-12 PROCEDURE — 36592 COLLECT BLOOD FROM PICC: CPT | Performed by: NURSE PRACTITIONER

## 2018-07-12 PROCEDURE — 25000132 ZZH RX MED GY IP 250 OP 250 PS 637: Mod: GY | Performed by: NURSE PRACTITIONER

## 2018-07-12 PROCEDURE — 40000193 ZZH STATISTIC PT WARD VISIT

## 2018-07-12 PROCEDURE — 25000128 H RX IP 250 OP 636: Performed by: NURSE PRACTITIONER

## 2018-07-12 PROCEDURE — 73030 X-RAY EXAM OF SHOULDER: CPT | Mod: LT

## 2018-07-12 PROCEDURE — A9270 NON-COVERED ITEM OR SERVICE: HCPCS | Mod: GY | Performed by: THORACIC SURGERY (CARDIOTHORACIC VASCULAR SURGERY)

## 2018-07-12 PROCEDURE — 97140 MANUAL THERAPY 1/> REGIONS: CPT | Mod: GO | Performed by: OCCUPATIONAL THERAPIST

## 2018-07-12 PROCEDURE — 97110 THERAPEUTIC EXERCISES: CPT | Mod: GO | Performed by: OCCUPATIONAL THERAPIST

## 2018-07-12 PROCEDURE — 40000802 ZZH SITE CHECK

## 2018-07-12 PROCEDURE — 94640 AIRWAY INHALATION TREATMENT: CPT | Mod: 76

## 2018-07-12 PROCEDURE — 94640 AIRWAY INHALATION TREATMENT: CPT

## 2018-07-12 PROCEDURE — 83735 ASSAY OF MAGNESIUM: CPT | Performed by: NURSE PRACTITIONER

## 2018-07-12 PROCEDURE — 25000125 ZZHC RX 250: Performed by: THORACIC SURGERY (CARDIOTHORACIC VASCULAR SURGERY)

## 2018-07-12 PROCEDURE — 94660 CPAP INITIATION&MGMT: CPT

## 2018-07-12 PROCEDURE — 40000275 ZZH STATISTIC RCP TIME EA 10 MIN

## 2018-07-12 PROCEDURE — 97535 SELF CARE MNGMENT TRAINING: CPT | Mod: GO | Performed by: OCCUPATIONAL THERAPIST

## 2018-07-12 PROCEDURE — 25000132 ZZH RX MED GY IP 250 OP 250 PS 637: Mod: GY | Performed by: THORACIC SURGERY (CARDIOTHORACIC VASCULAR SURGERY)

## 2018-07-12 RX ORDER — BUMETANIDE 2 MG/1
2 TABLET ORAL
Status: DISCONTINUED | OUTPATIENT
Start: 2018-07-12 | End: 2018-07-13 | Stop reason: HOSPADM

## 2018-07-12 RX ORDER — METOPROLOL TARTRATE 50 MG
50 TABLET ORAL 2 TIMES DAILY
Status: DISCONTINUED | OUTPATIENT
Start: 2018-07-12 | End: 2018-07-12

## 2018-07-12 RX ORDER — POTASSIUM CHLORIDE 750 MG/1
40 TABLET, EXTENDED RELEASE ORAL 2 TIMES DAILY
Status: DISCONTINUED | OUTPATIENT
Start: 2018-07-12 | End: 2018-07-12

## 2018-07-12 RX ORDER — CIPROFLOXACIN 250 MG/1
250 TABLET, FILM COATED ORAL EVERY 12 HOURS SCHEDULED
Status: DISCONTINUED | OUTPATIENT
Start: 2018-07-12 | End: 2018-07-13 | Stop reason: HOSPADM

## 2018-07-12 RX ORDER — POTASSIUM CHLORIDE 1500 MG/1
60 TABLET, EXTENDED RELEASE ORAL 2 TIMES DAILY
Status: DISCONTINUED | OUTPATIENT
Start: 2018-07-12 | End: 2018-07-13 | Stop reason: HOSPADM

## 2018-07-12 RX ADMIN — INSULIN ASPART 4 UNITS: 100 INJECTION, SOLUTION INTRAVENOUS; SUBCUTANEOUS at 08:41

## 2018-07-12 RX ADMIN — Medication 37.5 MG: at 09:06

## 2018-07-12 RX ADMIN — PREGABALIN 100 MG: 100 CAPSULE ORAL at 20:07

## 2018-07-12 RX ADMIN — GUAIFENESIN 600 MG: 600 TABLET, EXTENDED RELEASE ORAL at 20:07

## 2018-07-12 RX ADMIN — INSULIN ASPART 15 UNITS: 100 INJECTION, SOLUTION INTRAVENOUS; SUBCUTANEOUS at 18:56

## 2018-07-12 RX ADMIN — POLYETHYLENE GLYCOL 3350 17 G: 17 POWDER, FOR SOLUTION ORAL at 18:51

## 2018-07-12 RX ADMIN — ALTEPLASE 2 MG: 2.2 INJECTION, POWDER, LYOPHILIZED, FOR SOLUTION INTRAVENOUS at 11:46

## 2018-07-12 RX ADMIN — Medication 3 MG: at 22:00

## 2018-07-12 RX ADMIN — FLUTICASONE FUROATE AND VILANTEROL TRIFENATATE 1 PUFF: 100; 25 POWDER RESPIRATORY (INHALATION) at 08:34

## 2018-07-12 RX ADMIN — SENNOSIDES AND DOCUSATE SODIUM 2 TABLET: 8.6; 5 TABLET ORAL at 20:07

## 2018-07-12 RX ADMIN — ALBUTEROL SULFATE 2.5 MG: 2.5 SOLUTION RESPIRATORY (INHALATION) at 11:16

## 2018-07-12 RX ADMIN — GUAIFENESIN 600 MG: 600 TABLET, EXTENDED RELEASE ORAL at 08:36

## 2018-07-12 RX ADMIN — ACETAMINOPHEN 650 MG: 325 TABLET, FILM COATED ORAL at 22:01

## 2018-07-12 RX ADMIN — HYDRALAZINE HYDROCHLORIDE 50 MG: 50 TABLET ORAL at 08:36

## 2018-07-12 RX ADMIN — BUMETANIDE 3 MG: 2 TABLET ORAL at 08:35

## 2018-07-12 RX ADMIN — METOPROLOL TARTRATE 75 MG: 25 TABLET, FILM COATED ORAL at 20:07

## 2018-07-12 RX ADMIN — ACETAMINOPHEN 975 MG: 325 TABLET, FILM COATED ORAL at 18:01

## 2018-07-12 RX ADMIN — CIPROFLOXACIN HYDROCHLORIDE 250 MG: 250 TABLET, FILM COATED ORAL at 10:59

## 2018-07-12 RX ADMIN — HEPARIN SODIUM 5000 UNITS: 5000 INJECTION, SOLUTION INTRAVENOUS; SUBCUTANEOUS at 23:58

## 2018-07-12 RX ADMIN — Medication 500 MG: at 08:36

## 2018-07-12 RX ADMIN — POTASSIUM CHLORIDE 60 MEQ: 1500 TABLET, EXTENDED RELEASE ORAL at 20:16

## 2018-07-12 RX ADMIN — ACETAMINOPHEN 975 MG: 325 TABLET, FILM COATED ORAL at 08:34

## 2018-07-12 RX ADMIN — ASPIRIN 325 MG ORAL TABLET 325 MG: 325 PILL ORAL at 08:36

## 2018-07-12 RX ADMIN — LEVOTHYROXINE SODIUM 150 MCG: 150 TABLET ORAL at 09:06

## 2018-07-12 RX ADMIN — PANTOPRAZOLE SODIUM 40 MG: 40 TABLET, DELAYED RELEASE ORAL at 08:35

## 2018-07-12 RX ADMIN — HEPARIN SODIUM 5000 UNITS: 5000 INJECTION, SOLUTION INTRAVENOUS; SUBCUTANEOUS at 18:01

## 2018-07-12 RX ADMIN — Medication 37.5 MG: at 12:48

## 2018-07-12 RX ADMIN — ATORVASTATIN CALCIUM 40 MG: 40 TABLET, FILM COATED ORAL at 20:08

## 2018-07-12 RX ADMIN — MAGNESIUM SULFATE HEPTAHYDRATE 2 G: 40 INJECTION, SOLUTION INTRAVENOUS at 14:11

## 2018-07-12 RX ADMIN — ALBUTEROL SULFATE 2.5 MG: 2.5 SOLUTION RESPIRATORY (INHALATION) at 15:59

## 2018-07-12 RX ADMIN — SENNOSIDES AND DOCUSATE SODIUM 2 TABLET: 8.6; 5 TABLET ORAL at 09:06

## 2018-07-12 RX ADMIN — HYDROMORPHONE HYDROCHLORIDE 2 MG: 2 TABLET ORAL at 11:12

## 2018-07-12 RX ADMIN — POTASSIUM CHLORIDE 60 MEQ: 1500 TABLET, EXTENDED RELEASE ORAL at 08:35

## 2018-07-12 RX ADMIN — Medication 500 MG: at 18:01

## 2018-07-12 RX ADMIN — PREGABALIN 100 MG: 100 CAPSULE ORAL at 08:34

## 2018-07-12 RX ADMIN — LIDOCAINE 1 PATCH: 560 PATCH PERCUTANEOUS; TOPICAL; TRANSDERMAL at 22:01

## 2018-07-12 RX ADMIN — HYDRALAZINE HYDROCHLORIDE 50 MG: 50 TABLET ORAL at 20:08

## 2018-07-12 RX ADMIN — FEBUXOSTAT 40 MG: 40 TABLET ORAL at 20:08

## 2018-07-12 RX ADMIN — BUMETANIDE 2 MG: 2 TABLET ORAL at 18:00

## 2018-07-12 RX ADMIN — INSULIN ASPART 18 UNITS: 100 INJECTION, SOLUTION INTRAVENOUS; SUBCUTANEOUS at 14:09

## 2018-07-12 RX ADMIN — HYDRALAZINE HYDROCHLORIDE 50 MG: 50 TABLET ORAL at 14:09

## 2018-07-12 RX ADMIN — ESCITALOPRAM 20 MG: 20 TABLET, FILM COATED ORAL at 09:06

## 2018-07-12 RX ADMIN — ALBUTEROL SULFATE 2.5 MG: 2.5 SOLUTION RESPIRATORY (INHALATION) at 20:35

## 2018-07-12 RX ADMIN — ALBUTEROL SULFATE 2.5 MG: 2.5 SOLUTION RESPIRATORY (INHALATION) at 07:57

## 2018-07-12 RX ADMIN — CIPROFLOXACIN HYDROCHLORIDE 250 MG: 250 TABLET, FILM COATED ORAL at 20:07

## 2018-07-12 RX ADMIN — HEPARIN SODIUM 5000 UNITS: 5000 INJECTION, SOLUTION INTRAVENOUS; SUBCUTANEOUS at 09:06

## 2018-07-12 ASSESSMENT — PAIN DESCRIPTION - DESCRIPTORS
DESCRIPTORS: ACHING;CONSTANT
DESCRIPTORS: ACHING
DESCRIPTORS: STABBING
DESCRIPTORS: STABBING

## 2018-07-12 NOTE — PLAN OF CARE
Problem: Patient Care Overview  Goal: Plan of Care/Patient Progress Review  RN:  1.Pt will remain hemodynamically stable.    2.Tropinin/EKG WNL.   OT/6C:  Discharge Planner OT   Patient plan for discharge: ARU  Current status: Pt completed seated ADLs with set-up and min A. Pt completed LE ergometer 2 sets x2 min.   Barriers to return to prior living situation: strength, post op precautions, ADL I    Recommendations for discharge: ARU  Rationale for recommendations: to progress ADL I, strength, mobility, transfers        Entered by: Mariel Jackson 07/12/2018 4:01 PM

## 2018-07-12 NOTE — PLAN OF CARE
Problem: Patient Care Overview  Goal: Plan of Care/Patient Progress Review  RN:  1.Pt will remain hemodynamically stable.    2.Tropinin/EKG WNL.   OT/edema 6C: Pt continues with firm 1+ pitting edema in feet, ankles and LEs. Bruising in LEs appears stable and skin integrity unchanged/intact. GCBs reapplied from MTPs to knee creases. Please remove garment if it gets soiled or if pt c/o LE pain or numbness.

## 2018-07-12 NOTE — PLAN OF CARE
Problem: Patient Care Overview  Goal: Plan of Care/Patient Progress Review  RN:  1.Pt will remain hemodynamically stable.    2.Tropinin/EKG WNL.   Discharge Planner SLP   Patient plan for discharge: Unknown   Current status: Pt seen bedside for dysphagia f/u.  Diet advanced to regular textures and thin liquids with pt reporting good tolerance with implementation of strategies.  Pt is independent with strategy use. Recommend continue regular textures and thin liquids.  Pt must be upright for intake and should take small bites/sips at a slow pace.    Barriers to return to prior living situation: None   Recommendations for discharge: Defer to OT/PT   Rationale for recommendations: Anticipate ongoing dysphagia intervention not indicated; anticipate goals to be met in 1-2 sessions time        Entered by: Odilia Whitley 07/12/2018 10:27 AM

## 2018-07-12 NOTE — PROVIDER NOTIFICATION
Called by tele that pt had 31 beats of SVT vs aflutter in the 140s at 1930 and self converted out. Pt noted to have multiple PAC and PVC. Pt stated she was up to commode at the time.   At 2130 pt again converted into afib/SVT in the 150-160s. PT up to commode. Pt endorses fluttering in chest and slight dizziness. Pt had received PM metoprolol dose at 2100 as well as potassium replacement for K of 3.7. Pt converted out upon sitting down. BP After sitting, 161/74. pt endorsed L hand numbness. Neuro intact.   MD aware, up to assess pt. EKG done.   Will continue to monitor.

## 2018-07-12 NOTE — PROGRESS NOTES
Social Work Services Progress Note     Hospital Day: 13  Date of Initial Social Work Evaluation:  7/6/18  Collaborated with:  Pt, Odalis (Caregiver), CVTS team, SNF admissions     Data: Pt is a 73 year old female being followed by [facility] for placement to ARU.     Intervention: SW met with pt today and called Odalis to update on progress towards discharge.  Pt will be medically ready tomorrow per CVTS team.  ARU admissions does not anticipate a bed available tomorrow but does for the weekend.  Mariel and Odalis are ok with this plan.  Odalis is asking that weekend Charge RN or SW call her on her cell [phone] for timing of discharge (when known) as pt's sister plans to fly in from [place] for a visit on Saturday.      - Referrals in Process:    [facility]  [phone]     Assessment: Pt alert and motivated to transition to ARU.     Plan:    Anticipated Disposition: Facility:  ARU    Barriers to d/c plan: Medical stability, bed availability    Follow Up: SW to follow for placement to ARU     SAMANTHA Baptiste, TIMMY  6C Unit   Phone: [phone]  Pager: [phone]  Unit: [phone]    ___________________________________________________________________________________________________________________________________________________    Referrals Discontinued:  None    Community Case Management/Community Services in place:   None

## 2018-07-12 NOTE — DISCHARGE SUMMARY
[facility] [place]   [facility] Discharge Summary       Mariel Ribeiro MRN# [id number]   YOB: 1945 Age: 73 year old     Date of Admission:  6/29/2018  Date of Discharge::  7/13/2018  Admitting Physician:  Joao Mason MD  Discharge Physician:  Joao Mason MD  Primary Care Physician:        Rafaela William          Admission Diagnoses:   Unstable angina  Coronary artery disease  Chronic diastolic heart failure  Hypertension  Dyslipidemia  Type II diabetes mellitus  Chronic kidney disease stage III  Morbid obesity          Discharge Diagnosis:   Coronary artery disease - s/p CABG x3  Chronic diastolic heart failure  Hypertension  Dyslipidemia  Type II diabetes mellitus  Chronic kidney disease stage III  Urinary tract infection  Paroxysmal SVT  Morbid obesity         Procedures:   7/2/2018: Dr. Joao Mason:  1) Coronary artery bypass grafting x 3.                        - Left internal mammary artery to left anterior descending artery                        - Reversed saphenous vein graft to right posterior descending artery                        - Reversed saphenous vein graft to obtuse marginal artery 2  2) Endoscopic vein harvest bilateral lower extremity  3) Transesophageal echocardiogram  4) Epiaortic ultrasound        Non-operative procedures:   PICC line placement          Consultations:   CARDIOTHORACIC SURGERY ADULT IP CONSULT  LYMPHEDEMA THERAPY IP CONSULT  SPIRITUAL HEALTH SERVICES IP CONSULT  NUTRITION SERVICES ADULT IP CONSULT  CARDIAC REHAB IP CONSULT  PHYSICAL THERAPY ADULT IP CONSULT  OCCUPATIONAL THERAPY ADULT IP CONSULT  SPEECH LANGUAGE PATH ADULT IP CONSULT  VASCULAR ACCESS CARE ADULT IP CONSULT  VASCULAR ACCESS CARE ADULT IP CONSULT  ENDOCRINE DIABETES ADULT IP CONSULT  VASCULAR ACCESS CARE ADULT IP CONSULT  VASCULAR ACCESS CARE ADULT IP CONSULT  VASCULAR ACCESS ADULT IP CONSULT  VASCULAR ACCESS CARE ADULT  IP CONSULT  LYMPHEDEMA THERAPY IP CONSULT  PHYSICAL THERAPY ADULT IP CONSULT  OCCUPATIONAL THERAPY ADULT IP CONSULT          Brief History of Illness:   Mariel Ribeiro is a 73 year old female with a past medical history of DM2, HTN, CKD3, HLD, COPD, fibromyalgia, anxiety, depression, lymphedema, and multivessel CAD who presented with with unstable angina. Patient was originally scheduled to see Dr. Mason in clinic, however given her recurrent chest pain, CABG was moved to 7/2/2018.            Hospital Course:   Mariel Ribeiro is a 73 year old female who on 7/2/2018 underwent the above-named procedures.  Patient tolerated the operation well and was admitted to the CVICU post-operatively.  Patient was extubated on POD # 2.  Pressors were weaned off as able. Patient's ICU stay was unremarkable. Patient was transferred to the surgical floor on POD # 3. Chest tubes were removed when drainage decreased and follow-up CXR was negative for any significant pneumothorax. TPW were removed when appropriate.    Patient continued to improve post-operatively. Patient was started on ASA, atorvastatin, metoprolol 75 mg BID. Patient remained hemodynamically stable. ACE-I was held due to elevated creatinine. Will start on an outpatient basis. Blood pressures were controlled with metoprolol and PO hydralazine 75 mg TID. She did have brief episodes of SVT for which metorpolol was increased. Episodes became shorter and less frequent at time of discharge. Patient remained stable and asymptomatic.     Patient was diuresed with bumex 3 mg IV. She was transitioned to bumex 2 mg PO BID with good response. She will continue this at time of discharge. Patient did have episode of dysuria and UA showed large LE and WBC, cultures grew Enterobacter cloacae complex, e.coli sensitive to cipro. Will treat with a 7 day course of ciprofloxacin.    Patient has a history of uncontrolled diabetes mellitus with a hemoglobin A1c of 9.3. Endocrinology was  consulted and blood sugars were tightly controlled at time of discharge. They will continue to follow at rehab.     Post-operative pain control included PO dilaudid and tylenol and will be discharged on PO dilaudid and tylenol.     Prior to discharge, patient's pain was controlled well, she was able to perform most ADLs and ambulate without difficulty, and had full return of bowel and bladder function.  On July 13, 2018, she was Discharged to rehabilitation facility in stable condition.      Day of Discharge Exam   Vitals:  Temp:  [97.8  F (36.6  C)-98.1  F (36.7  C)] 98.1  F (36.7  C)  Pulse:  [89] 89  Heart Rate:  [72-99] 82  Resp:  [18-20] 18  BP: (120-143)/(55-68) 143/56  FiO2 (%):  [30 %] 30 %  SpO2:  [97 %-98 %] 97 %  Wt: 308 lbs 6.4 oz, 139 kg      Physical Exam:   Gen: A&Ox3, NAD, conversational  CV: RRR, S1S2 normal, no murmurs, rubs, or gallops  Pulm: Lungs diminished in bases, otherwise CTA, no rhonchi or wheezes  Abd: +BS, Soft, nondistended, NTTP  Ext: 1-2+ bilateral lower extremity edema  Neuro: CN II-XII intact, no focal deficits  Incision: Clean, dry, intact, no erythema, staples in bilateral LE incisions, scant serous drainage.  Chest Tube sites: Dressings clean and dry    Labs:   BMP    Recent Labs  Lab 07/13/18 0507 07/12/18 0612 07/11/18 2010 07/11/18  0611 07/10/18  0603    140  --  141 142   POTASSIUM 3.6 4.1 3.7 4.0 4.4   CHLORIDE 102 100  --  104 107   CO2 30 32  --  30 30   BUN 37* 35*  --  34* 33*   CR 1.41* 1.46*  --  1.31* 1.39*   * 114*  --  142* 83   MAG 1.8 2.0 2.2 1.5*  --    PHOS 4.1 4.3  --  3.8  --      CBC    Recent Labs  Lab 07/13/18 0507 07/12/18 0612 07/11/18  0611 07/10/18  0603   WBC 8.7 8.5 8.3 10.6   RBC 2.72* 2.78* 2.73* 2.75*   HGB 7.5* 7.7* 7.6* 7.5*   HCT 25.3* 26.2* 25.2* 25.6*   MCV 93 94 92 93   MCH 27.6 27.7 27.8 27.3   MCHC 29.6* 29.4* 30.2* 29.3*   RDW 16.6* 16.7* 16.4* 16.4*    226 205 196     INRNo lab results found in last 7 days.    Hepatic Panel No lab results found in last 7 days.         Imaging Studies:   CXR 7/8/2018:  1. New right upper extremity PICC tip projects over the mid SVC.  2. Retrocardiac opacities, which favor atelectasis, given asymmetric  volume loss.  3. Mild enlargement of the cardiac silhouette.    Shoulder XR 7/12:  Impression: Severe osteoarthrosis of the left glenohumeral joint.    Abdominal XR 7/2/2018:  1. Gastric tube tip and sidehole project over the stomach.  2. Nonspecific bowel gas pattern with paucity of air-filled bowel.      Final Pathology/Culture Result:   Urine culture 7/12:  Enterobacter cloacae complex, e.coli      Drains/tubes Present at Discharge   None         Medications Prior to Admission:     Prescriptions Prior to Admission   Medication Sig Dispense Refill Last Dose     albuterol (ALBUTEROL) 108 (90 BASE) MCG/ACT Inhaler INHALE 1 TO 2 PUFFS EVERY 4 TO 6 HOURS AS NEEDED 1 Inhaler PRN 6/29/2018 at Unknown time     aspirin  MG tablet Take 1 tablet by mouth daily. 30 tablet 0 6/29/2018 at Unknown time     atorvastatin (LIPITOR) 40 MG tablet TAKE 1 TABLET(40 MG) BY MOUTH DAILY 90 tablet 3 6/29/2018 at Unknown time     B-D U/F insulin pen needle USE FOR INSULIN INJECTION 100 each 0 6/28/2018 at Unknown time     blood glucose monitoring (NO BRAND SPECIFIED) test strip 1 strip by In Vitro route 2 times daily Strips per patient's preference 200 each 3 6/29/2018 at Unknown time     Cholecalciferol (VITAMIN D3) 5000 UNIT/ML LIQD Take 10,000 Units by mouth daily    6/29/2018 at Unknown time     cyclobenzaprine (FLEXERIL) 10 MG tablet TAKE 1 TABLET(10 MG) BY MOUTH THREE TIMES DAILY AS NEEDED FOR MUSCLE SPASMS 90 tablet 3 6/29/2018 at Unknown time     EPINEPHrine (EPIPEN/ADRENACLICK/OR ANY BX GENERIC EQUIV) 0.3 MG/0.3ML injection 2-pack Inject 0.3 mLs (0.3 mg) into the muscle once as needed for anaphylaxis 0.6 mL 0 Unknown at Unknown time     escitalopram (LEXAPRO) 20 MG tablet Take 1 tablet (20 mg) by  mouth every morning 90 tablet 3 6/29/2018 at Unknown time     Folic Acid 800 MCG TABS Take 2 tablets by mouth daily 2 (400 MCG) tablets   6/25/2018 at 0800     furosemide (LASIX) 40 MG tablet TAKE 2 TABLETS(80 MG) BY MOUTH TWICE DAILY 120 tablet 3 6/29/2018 at Unknown time     GUAIFENESIN  MG OR TBCR Take 600 mg by mouth twice daily 30 0 6/29/2018 at Unknown time     insulin glargine (LANTUS SOLOSTAR) 100 UNIT/ML injection Inject 5 Units Subcutaneous At Bedtime (Patient taking differently: Inject 40 Units Subcutaneous At Bedtime ) 15 mL 1 6/25/2018 at Unknown time     insulin NPH (NOVOLIN N VIAL) 100 UNIT/ML injection 25-35 units before breakfast  25-35 units before dinner 30 mL 5 6/29/2018 at Unknown time     insulin regular (NOVOLIN R VIAL) 100 UNIT/ML injection 10-20 units before breakfast, 10-20 units before lunch, 10-20 units before dinner 20 mL 5 6/29/2018 at Unknown time     insulin syringes, disposable, U-100 1 ML MISC 1 each 5 times daily 100 each 11 6/29/2018 at Unknown time     levothyroxine (SYNTHROID/LEVOTHROID) 150 MCG tablet Take 1 tablet (150 mcg) by mouth daily 90 tablet 3 6/29/2018 at Unknown time     [employer] FINEPOINT LANCETS MISC Use to test blood sugars 2 times daily or as directed. 100 each prn 6/29/2018 at Unknown time     LYRICA 100 MG capsule TAKE ONE CAPSULE BY MOUTH TWICE DAILY 180 capsule 3 6/29/2018 at Unknown time     niacin (NIASPAN) 500 MG CR tablet TAKE 1 TABLET(500 MG) BY MOUTH TWICE DAILY 180 tablet 1 6/29/2018 at Unknown time     nitroGLYcerin (NITROSTAT) 0.4 MG sublingual tablet Place 1 tablet (0.4 mg) under the tongue every 5 minutes as needed for chest pain As needed for chest pain. 25 tablet PRN 6/29/2018 at Unknown time     ONE TOUCH ULTRA (DEVICES) MISC test blood sugar BID 1 0 6/29/2018 at Unknown time     prochlorperazine (COMPAZINE) 5 MG tablet Take 1-2 tablets (5-10 mg) by mouth every 6 hours as needed (Headache) 10 tablet 0 6/29/2018 at Unknown time      tiZANidine (ZANAFLEX) 4 MG tablet TAKE 1 TO 2 TABLETS(4 TO 8 MG) BY MOUTH THREE TIMES DAILY AS NEEDED 90 tablet 3 Unknown at Unknown time     traZODone (DESYREL) 50 MG tablet Take 3 tablets (150 mg) by mouth At Bedtime 90 tablet 7 6/28/2018 at Unknown time     ULORIC 40 MG TABS tablet TAKE 1 TABLET(40 MG) BY MOUTH EVERY EVENING 90 tablet 1 6/28/2018 at Unknown time     cycloSPORINE (RESTASIS) 0.05 % ophthalmic emulsion Place 1 drop into both eyes 2 times daily 180 each 3 Unknown at Unknown time     potassium chloride SA (K-DUR/KLOR-CON M) 10 MEQ CR tablet TAKE 2 TABLETS BY MOUTH TWICE DAILY 360 tablet 1 Unknown at Unknown time     senna-docusate (SENOKOT-S;PERICOLACE) 8.6-50 MG per tablet Take 1-2 tablets by mouth 2 times daily as needed for constipation 60 tablet 0 Unknown at Unknown time     sitagliptin (JANUVIA) 50 MG tablet Take 1 tablet (50 mg) by mouth daily 90 tablet 3 Unknown at Unknown time           Discharge Medications:        Review of your medicines      START taking       Dose / Directions    acetaminophen 325 MG tablet   Commonly known as:  TYLENOL   Used for:  Acute post-operative pain        Dose:  650 mg   Take 2 tablets (650 mg) by mouth every 4 hours as needed for mild pain   Quantity:  100 tablet   Refills:  0       artificial saliva Soln solution        Dose:  1 spray   Swish and spit 1 mL (1 spray) in mouth 4 times daily as needed for dry mouth   Refills:  0       bumetanide 2 MG tablet   Commonly known as:  BUMEX   Used for:  Chronic diastolic heart failure (H)        Dose:  2 mg   Take 1 tablet (2 mg) by mouth 2 times daily   Refills:  0       ciprofloxacin 250 MG tablet   Commonly known as:  CIPRO   Indication:  Urinary Tract Infection   Used for:  Urinary tract infection associated with indwelling urethral catheter, initial encounter (H)        Dose:  250 mg   Take 1 tablet (250 mg) by mouth every 12 hours for 6 days   Refills:  0       fluticasone-vilanterol 100-25 MCG/INH oral inhaler    Commonly known as:  BREO ELLIPTA   Used for:  Chronic obstructive pulmonary disease, unspecified COPD type (H)        Dose:  1 puff   Start taking on:  7/14/2018   Inhale 1 puff into the lungs daily   Refills:  0       hydrALAZINE 25 MG tablet   Commonly known as:  APRESOLINE   Used for:  Benign essential hypertension        Dose:  75 mg   Take 3 tablets (75 mg) by mouth 3 times daily   Quantity:  60 tablet   Refills:  0       HYDROmorphone 2 MG tablet   Commonly known as:  DILAUDID   Used for:  Acute post-operative pain        Dose:  2-4 mg   Take 1-2 tablets (2-4 mg) by mouth every 3 hours as needed for moderate to severe pain   Quantity:  30 tablet   Refills:  0       * insulin aspart 100 UNIT/ML injection   Commonly known as:  NovoLOG PEN   Used for:  Type 2 diabetes mellitus with chronic kidney disease, without long-term current use of insulin, unspecified CKD stage (H)        Subcutaneous Take with snacks or supplements for high blood sugar 1 units per 5 grams of carbohydrate. Only chart total amount of units given.  Do not give if pre-prandial glucose is less than 60 mg/dL.   Refills:  0       * insulin aspart 100 UNIT/ML injection   Commonly known as:  NovoLOG PEN   Used for:  Type 2 diabetes mellitus with chronic kidney disease, without long-term current use of insulin, unspecified CKD stage (H)        Dose:  1-10 Units   Inject 1-10 Units Subcutaneous 3 times daily (before meals) For Pre-Meal -164 give 1 unit.  Pre-Meal -189 give 2 units.  Pre-Meal -214 give 3 units.  Pre-Meal -239 give 4 units.  Pre-Meal -264 give 5 units.  Pre-Meal -289 give 6 units.  Pre-Meal -314 give 7 units.  Pre-Meal -339 give 8 units.  Pre-Meal -364 give 9 units. Pre-Meal BG greater than or equal to 365 give 10 units   Refills:  0       * insulin aspart 100 UNIT/ML injection   Commonly known as:  NovoLOG PEN   Used for:  Type 2 diabetes mellitus with chronic kidney disease,  without long-term current use of insulin, unspecified CKD stage (H)        Dose:  1-7 Units   Inject 1-7 Units Subcutaneous At Bedtime For  - 224 give 1 units.  For  - 249 give 2 units.  For  - 274 give 3 units.  For  - 299 give 4 units.  For  - 324 give 5 units.  For  - 349 give 6 units.  For BG greater than or equal to 350 give 7 units.  Notify provider if glucose greater than or equal to 350 mg/dL after administration of correction dose.   Refills:  0       * insulin aspart 100 UNIT/ML injection   Commonly known as:  NovoLOG PEN   Used for:  Type 2 diabetes mellitus with chronic kidney disease, without long-term current use of insulin, unspecified CKD stage (H)        Inject Subcutaneous 3 times daily (with meals) DOSE:  1 units per 5 grams of carbohydrate.  Only chart total amount of units given.  Do not give if pre-prandial glucose is less than 60 mg/dL.   Refills:  0       * insulin isophane human 100 UNIT/ML injection   Commonly known as:  HumuLIN N PEN   Used for:  Type 2 diabetes mellitus with chronic kidney disease, without long-term current use of insulin, unspecified CKD stage (H)        Dose:  30 Units   Inject 30 Units Subcutaneous every 24 hours At 2000   Refills:  0       * insulin isophane human 100 UNIT/ML injection   Commonly known as:  HumuLIN N PEN   Used for:  Type 2 diabetes mellitus with chronic kidney disease, without long-term current use of insulin, unspecified CKD stage (H)   Replaces:  insulin  UNIT/ML injection        Dose:  35 Units   Start taking on:  7/14/2018   Inject 35 Units Subcutaneous every 24 hours At 0900   Refills:  0       melatonin 3 MG tablet        Dose:  3 mg   Take 1 tablet (3 mg) by mouth At Bedtime   Refills:  0       Metoprolol Tartrate 75 MG Tabs   Used for:  Benign essential hypertension        Dose:  75 mg   Take 75 mg by mouth 2 times daily   Quantity:  60 tablet   Refills:  0       pantoprazole 40 MG EC tablet    Commonly known as:  PROTONIX        Dose:  40 mg   Start taking on:  7/14/2018   Take 1 tablet (40 mg) by mouth every morning (before breakfast)   Quantity:  30 tablet   Refills:  0       polyethylene glycol Packet   Commonly known as:  MIRALAX/GLYCOLAX        Dose:  17 g   Start taking on:  7/14/2018   Take 17 g by mouth daily   Quantity:  7 packet   Refills:  0       * Notice:  This list has 6 medication(s) that are the same as other medications prescribed for you. Read the directions carefully, and ask your doctor or other care provider to review them with you.      CONTINUE these medicines which may have CHANGED, or have new prescriptions. If we are uncertain of the size of tablets/capsules you have at home, strength may be listed as something that might have changed.       Dose / Directions    * albuterol 108 (90 Base) MCG/ACT Inhaler   Commonly known as:  PROAIR HFA   This may have changed:  Another medication with the same name was added. Make sure you understand how and when to take each.   Used for:  Chronic obstructive pulmonary disease with acute exacerbation (H)        INHALE 1 TO 2 PUFFS EVERY 4 TO 6 HOURS AS NEEDED   Quantity:  1 Inhaler   Refills:  PRN       * albuterol (2.5 MG/3ML) 0.083% neb solution   This may have changed:  You were already taking a medication with the same name, and this prescription was added. Make sure you understand how and when to take each.   Used for:  Chronic obstructive pulmonary disease, unspecified COPD type (H)        Dose:  2.5 mg   Take 1 vial (2.5 mg) by nebulization 4 times daily   Quantity:  360 mL   Refills:  0       potassium chloride SA 20 MEQ CR tablet   Commonly known as:  K-DUR/KLOR-CON M   This may have changed:    - medication strength  - how much to take  - how to take this  - when to take this  - additional instructions   Used for:  Hypokalemia, Chronic diastolic heart failure (H)        Dose:  60 mEq   Take 3 tablets (60 mEq) by mouth 2 times daily    Quantity:  60 tablet   Refills:  0       * Notice:  This list has 2 medication(s) that are the same as other medications prescribed for you. Read the directions carefully, and ask your doctor or other care provider to review them with you.      CONTINUE these medicines which have NOT CHANGED       Dose / Directions    aspirin 325 MG EC tablet        Dose:  325 mg   Take 1 tablet by mouth daily.   Quantity:  30 tablet   Refills:  0       atorvastatin 40 MG tablet   Commonly known as:  LIPITOR   Used for:  Hyperlipidemia with target LDL less than 70        TAKE 1 TABLET(40 MG) BY MOUTH DAILY   Quantity:  90 tablet   Refills:  3       B-D U/F 31G X 5 MM   Used for:  Type 2 diabetes mellitus with diabetic polyneuropathy, without long-term current use of insulin (H)   Generic drug:  insulin pen needle        USE FOR INSULIN INJECTION   Quantity:  100 each   Refills:  0       blood glucose monitoring test strip   Commonly known as:  no brand specified   Used for:  Type 2 diabetes mellitus with diabetic polyneuropathy, without long-term current use of insulin (H)        Dose:  1 strip   1 strip by In Vitro route 2 times daily Strips per patient's preference   Quantity:  200 each   Refills:  3       cyclobenzaprine 10 MG tablet   Commonly known as:  FLEXERIL   Used for:  Acute right-sided thoracic back pain        TAKE 1 TABLET(10 MG) BY MOUTH THREE TIMES DAILY AS NEEDED FOR MUSCLE SPASMS   Quantity:  90 tablet   Refills:  3       cycloSPORINE 0.05 % ophthalmic emulsion   Commonly known as:  RESTASIS   Used for:  Keratitis sicca, bilateral        Dose:  1 drop   Place 1 drop into both eyes 2 times daily   Quantity:  180 each   Refills:  3       EPINEPHrine 0.3 MG/0.3ML injection 2-pack   Commonly known as:  EPIPEN/ADRENACLICK/or ANY BX GENERIC EQUIV   Used for:  Allergic reaction, subsequent encounter        Dose:  0.3 mg   Inject 0.3 mLs (0.3 mg) into the muscle once as needed for anaphylaxis   Quantity:  0.6 mL    Refills:  0       escitalopram 20 MG tablet   Commonly known as:  LEXAPRO   Used for:  Major depressive disorder, single episode, in partial remission (H)        Dose:  20 mg   Take 1 tablet (20 mg) by mouth every morning   Quantity:  90 tablet   Refills:  3       folic acid 800 MCG Tabs        Dose:  2 tablet   Take 2 tablets by mouth daily 2 (400 MCG) tablets   Refills:  0       GUAIFENESIN  MG Tbcr        Take 600 mg by mouth twice daily   Quantity:  30   Refills:  0       insulin syringes (disposable) U-100 1 ML Misc   Used for:  Type 2 diabetes mellitus with chronic kidney disease, without long-term current use of insulin, unspecified CKD stage (H)        Dose:  1 each   1 each 5 times daily   Quantity:  100 each   Refills:  11       levothyroxine 150 MCG tablet   Commonly known as:  SYNTHROID/LEVOTHROID   Used for:  Hypothyroidism, unspecified type        Dose:  150 mcg   Take 1 tablet (150 mcg) by mouth daily   Quantity:  90 tablet   Refills:  3       [employer] FINEPOINT LANCETS Misc   Used for:  Type 2 diabetes mellitus with diabetic polyneuropathy, without long-term current use of insulin (H)        Use to test blood sugars 2 times daily or as directed.   Quantity:  100 each   Refills:  prn       LYRICA 100 MG capsule   Used for:  Myalgia   Generic drug:  pregabalin        TAKE ONE CAPSULE BY MOUTH TWICE DAILY   Quantity:  180 capsule   Refills:  3       niacin 500 MG CR tablet   Commonly known as:  NIASPAN   Used for:  Hyperlipidemia with target LDL less than 70        TAKE 1 TABLET(500 MG) BY MOUTH TWICE DAILY   Quantity:  180 tablet   Refills:  1       nitroGLYcerin 0.4 MG sublingual tablet   Commonly known as:  NITROSTAT   Used for:  Atherosclerosis of native coronary artery of native heart, angina presence unspecified        Dose:  0.4 mg   Place 1 tablet (0.4 mg) under the tongue every 5 minutes as needed for chest pain As needed for chest pain.   Quantity:  25 tablet   Refills:  PRN       ONE  TOUCH ULTRA (DEVICES) Misc   Used for:  Type II or unspecified type diabetes mellitus without mention of complication, not stated as uncontrolled        test blood sugar BID   Quantity:  1   Refills:  0       prochlorperazine 5 MG tablet   Commonly known as:  COMPAZINE        Dose:  5-10 mg   Take 1-2 tablets (5-10 mg) by mouth every 6 hours as needed (Headache)   Quantity:  10 tablet   Refills:  0       senna-docusate 8.6-50 MG per tablet   Commonly known as:  SENOKOT-S;PERICOLACE   Used for:  Constipation        Dose:  1-2 tablet   Take 1-2 tablets by mouth 2 times daily as needed for constipation   Quantity:  60 tablet   Refills:  0       traZODone 50 MG tablet   Commonly known as:  DESYREL   Used for:  Insomnia, unspecified type        Dose:  150 mg   Take 3 tablets (150 mg) by mouth At Bedtime   Quantity:  90 tablet   Refills:  7       ULORIC 40 MG Tabs tablet   Used for:  Hyperuricemia   Generic drug:  febuxostat        TAKE 1 TABLET(40 MG) BY MOUTH EVERY EVENING   Quantity:  90 tablet   Refills:  1       vitamin D3 5000 UNIT/ML Liqd   Indication:  Liquid gel caps        Dose:  20700 Units   Take 10,000 Units by mouth daily   Refills:  0         STOP taking          furosemide 40 MG tablet   Commonly known as:  LASIX           insulin glargine 100 UNIT/ML injection   Commonly known as:  LANTUS SOLOSTAR           insulin  UNIT/ML injection   Commonly known as:  NovoLIN N VIAL   Replaced by:  insulin isophane human 100 UNIT/ML injection           insulin regular 100 UNIT/ML injection   Commonly known as:  NovoLIN R VIAL           sitagliptin 50 MG tablet   Commonly known as:  JANUVIA           tiZANidine 4 MG tablet   Commonly known as:  ZANAFLEX                Where to get your medicines      Some of these will need a paper prescription and others can be bought over the counter. Ask your nurse if you have questions.     You don't need a prescription for these medications      acetaminophen 325 MG tablet      albuterol (2.5 MG/3ML) 0.083% neb solution     artificial saliva Soln solution     bumetanide 2 MG tablet     ciprofloxacin 250 MG tablet     fluticasone-vilanterol 100-25 MCG/INH oral inhaler     hydrALAZINE 25 MG tablet     insulin aspart 100 UNIT/ML injection     insulin aspart 100 UNIT/ML injection     insulin aspart 100 UNIT/ML injection     insulin aspart 100 UNIT/ML injection     insulin isophane human 100 UNIT/ML injection     insulin isophane human 100 UNIT/ML injection     melatonin 3 MG tablet     Metoprolol Tartrate 75 MG Tabs     pantoprazole 40 MG EC tablet     polyethylene glycol Packet     potassium chloride SA 20 MEQ CR tablet         Information about where to get these medications is not yet available     ! Ask your nurse or doctor about these medications      HYDROmorphone 2 MG tablet                  Discharge Instructions and Follow-Up:   Avoid lifting anything greater than ten pounds for 6 weeks after surgery and then less than 20 pounds for an additional 6 weeks.    No driving for 4 weeks after surgery or while on pain medication. If you had a minimally invasive procedure, you may drive after three weeks if not taking pain medication.      Avoid strenuous activities such as bowling, vacuuming, raking, shoveling, golf or tennis for 12 weeks after your surgery. It is okay to resume sex if you feel comfortable in doing so. You may have to try different positions with your partner.     Splint your chest incision by hugging a pillow or bringing your arms across your chest when coughing or sneezing. Avoid pushing off with your arms when getting up for the first month if you have had your sternum opened.     Shower or wash your incisions daily with soap and water (or as instructed), pat dry. Watch for signs of infection: increased redness, tenderness, warmth or any drainage that appears infected (pus like) or is persistent.  Also a temperature > 100.5 F or chills. Call your surgeon or primary  care provider's office immediately. Remove any skin glue left on incisions after 10 days. This will not affect your incision and can speed up healing.    Exercise is very important in your recovery. Please follow the guidelines set up for you in your cardiac rehab classes at the hospital. If outpatient cardiac rehab was ordered for you, we highly recommend you participate. If you have problems arranging your cardiac rehab, please call [phone].     Avoid sitting for prolonged periods of time, try to walk every hour during the day. If you have a leg incision, elevate your leg often when you are not walking.    Check your weight when you get home from the hospital and continue to check it daily through your recovery for at least a month. If you notice a weight gain of 2-3 pounds in a week, notify your primary care physician, cardiologist or surgeon.    Bowel activity may be slow after surgery. If necessary, you may take an over the counter laxative such as Milk of Magnesia or Miralax. You may have stool softeners prescribed (docusate sodium, Senokot). We recommend using stool softeners while using narcotics for pain (oxycodone/percocet, hydrocodone/vicodin).      DENTAL VISITS AFTER SURGERY  If you have had your heart valve repaired or replaced, we do not recommend having any dental work done for 6 months and you will need to take an antibiotic prior to dental visits from now on.  Please notify your dentist before any procedure for the proper treatment needed. The antibiotic is taken by mouth one hour prior to visit. This includes routine cleanings.    DO NOT SMOKE.  IF YOU NEED HELP QUITTING, PLEASE TALK WITH YOUR CARDIOLOGIST OR PRIMARY DOCTOR.    If you are on a blood thinner, follow the instructions you were given in the hospital and DO NOT SKIP this medication. Try and take it the same time everyday. Your primary care physician or coumadin clinic will manage the dosing.     If you have an LVAD device call  your LVAD coordinator.   If you had a heart transplant call your Transplant Coordinator.    REGARDING PRESCRIPTION REFILLS.  If you need a refill on your pain medication contact us.  All other medications will be adjusted, discontinued and re-filled by your primary care physician and/or your cardiologist as they were prior to your surgery. We have given you enough for one to three month with possibly one refill.    POST-OPERATIVE CLINIC VISITS  You have a follow up visit with [facility] Surgery Advance Care Practitioners on 7/20/2018 at 2 pm at the [facility].  You will then return to the care of your primary physician and your cardiologist.   It is important to call for an appointment to see your cardiologist in 4-6 weeks after surgery and your primary care physician in 2-4 weeks after surgery. If there is a need to return to see CT Surgery please call our  at [phone].    SURGICAL QUESTIONS  Please call Cynthia Donahue with any surgical questions, her phone number is listed below.  She can assist you with your needs and contact other surgery care team members as indicated.    For general questions or concerns, please call the Cardiothoracic Surgery Department at [phone] 8-4:30 M-F.   On weekends or after hours, please call [phone] and ask the  to   page the Cardiothoracic Surgery fellow on call.        Home Health Care:   N/A- discharged to rehab facility           Discharge Disposition:   Discharged to rehabilitation facility      Condition at discharge: Stable    Kb Jeffries PA-C            CC:Rafaela Bulter Cindy Maliea

## 2018-07-12 NOTE — PROGRESS NOTES
Diabetes Consult Daily  Progress Note          Assessment/Plan:   Mariel Ribeiro is a 73 year old female Past mental history of diabetes,obesity, COPD, CAD with history of stent placement, CKD stage III, hyperlipidemia,fibromyalgia, anxiety, depression, lymphedema, S/P CABG on 07/ 02/18                         PLAN  -NPH 35 units am ( 0900)  -NPH 30 units PM ( 2000)  -aspart for meals and snacks: 1 unit per 5 grams of CHO   -aspart for correction  high intensity ac and hs  -monitor glucose ac, hs, and 0200   Will continue to follow          Outpatient diabetes follow up: Pt and SO would like to consider seeing endocrinologist- ph number for [facility] included in AVS.  Plan discussed with primary team                                          Plan for [facility] on Friday, July 13           Interval History:   The last 24 hours progress and nursing notes reviewed.  Blood sugars are in good control and tightly controlled  glucose range from 114 to 153  glucose at ~ 0200, 118 and fasting 114/121  Appetite is good, eating less carbohydrates now since off full liquid diet  Working with therapies    PTA:   NPH 35/40  Regular 16 to 18 units breakfast and pre-lunch  Dinner 10-12 units    Recent Labs  Lab 07/12/18  0715 07/12/18  0612 07/12/18  0207 07/11/18  2242 07/11/18  1825 07/11/18  1258 07/11/18  0859 07/11/18  0611  07/10/18  0603  07/09/18  0555  07/08/18  0447  07/07/18  0546   GLC  --  114*  --   --   --   --   --  142*  --  83  --  78  --  202*  --  129*   *  --  118* 192* 134* 153* 125*  --   < >  --   < >  --   < >  --   < >  --    < > = values in this interval not displayed.            Review of Systems:   See interval hx          Medications:       Active Diet Order      Combination Diet Regular Diet Adult; 6347-7296 Calories: Moderate Consistent CHO (4-6 CHO units/meal)     Physical Exam:  Gen:  NAD   HEENT:  mucous membranes are moist  Resp: non-labored, supplemental  "oxygen  Ext: +lower extremity edema   Neuro: sleeping  /68 (BP Location: Left arm)  Pulse 90  Temp 98.1  F (36.7  C) (Oral)  Resp 20  Ht 1.676 m (5' 6\")  Wt 140.1 kg (308 lb 14.4 oz)  SpO2 98%  BMI 49.86 kg/m2           Data:     Lab Results   Component Value Date    A1C 9.3 07/01/2018    A1C 8.2 03/19/2018    A1C 7.3 08/09/2017    A1C 7.2 03/08/2017    A1C 7.3 12/23/2016              CBC RESULTS:   Recent Labs   Lab Test  07/12/18   0612   WBC  8.5   RBC  2.78*   HGB  7.7*   HCT  26.2*   MCV  94   MCH  27.7   MCHC  29.4*   RDW  16.7*   PLT  226     Recent Labs   Lab Test  07/12/18   0612  07/11/18 2010 07/11/18   0611   NA  140   --   141   POTASSIUM  4.1  3.7  4.0   CHLORIDE  100   --   104   CO2  32   --   30   ANIONGAP  7   --   7   GLC  114*   --   142*   BUN  35*   --   34*   CR  1.46*   --   1.31*   ANTOINETTE  9.5   --   9.2     Liver Function Studies -   Recent Labs   Lab Test  07/01/18   0621   PROTTOTAL  6.1*   ALBUMIN  3.1*   BILITOTAL  1.0   ALKPHOS  100   AST  20   ALT  26     Lab Results   Component Value Date    INR 1.09 07/06/2018    INR 1.30 07/03/2018    INR 1.40 07/02/2018    INR 1.52 07/02/2018    INR 1.01 06/29/2018    INR 1.12 08/08/2017    INR 1.04 05/18/2017    INR 1.06 11/18/2016    INR 1.02 10/27/2016    INR 1.19 12/19/2015    INR 1.08 05/05/2015    INR 1.4 05/04/2015    INR 0.97 01/28/2014           Naila Rose -[phone]  Diabetes Management job code [id number]            "

## 2018-07-12 NOTE — PROGRESS NOTES
CVTS Daily Note  7/12/2018  Attending: Joao Mason, *    S:   31 beat run of SVT overnight, asymptomatic, increased PVCs, K replaced and improved.   States that pain is being controlled. Denies any hallucinations.   Only complaint is left shoulder pain which is limiting her ROM. Has history chronic shoulder dislocations, reduced by her chiropractor. This pain is similar in nature to her shoulder dislocations. Denies any chest pain or pressure.   Breathing is ok. Working with IS.   Denies any popping/clicking in her breast bone.  Working with therapies.     Denies F/C/Sweats.  No CP, SOB, or calf pain.    Tolerating diet, + BM.  + Flatus.      O:   Vitals:    07/12/18 0020 07/12/18 0208 07/12/18 0355 07/12/18 0710   BP: 129/65  135/59 131/51   BP Location: Left arm  Left arm Left arm   Pulse:       Resp:   18 16   Temp:   98.2  F (36.8  C) 97.8  F (36.6  C)   TempSrc:   Oral Oral   SpO2:   95% 96%   Weight:  140.1 kg (308 lb 14.4 oz)     Height:         Vitals:    07/10/18 0307 07/11/18 0200 07/12/18 0208   Weight: 142.9 kg (315 lb) 142.1 kg (313 lb 3.2 oz) 140.1 kg (308 lb 14.4 oz)       Intake/Output Summary (Last 24 hours) at 07/12/18 0858  Last data filed at 07/12/18 0605   Gross per 24 hour   Intake              640 ml   Output             2550 ml   Net            -1910 ml     Gen: up in chair, comfortable, +O2 (1L NC)  CV: RRR, telemetry SR 70s, +edema LE (legs wrapped)  Pulm: decreased breath sounds in bases, -750 ml  Abd: BS+, soft  Incision: sternal incision D/I   Bilateral LE incision with serous output, +staples    Labs:  BMP    Recent Labs  Lab 07/12/18  0612 07/11/18 2010 07/11/18 0611 07/10/18  0603 07/09/18  0555     --  141 142 147*   POTASSIUM 4.1 3.7 4.0 4.4 4.5   CHLORIDE 100  --  104 107 109   ANTOINETTE 9.5  --  9.2 9.2 9.6   CO2 32  --  30 30 29   BUN 35*  --  34* 33* 34*   CR 1.46*  --  1.31* 1.39* 1.44*   *  --  142* 83 78     CBC    Recent Labs  Lab 07/12/18  0684  07/11/18  0611 07/10/18  0603 07/09/18  0555   WBC 8.5 8.3 10.6 11.3*   RBC 2.78* 2.73* 2.75* 2.71*   HGB 7.7* 7.6* 7.5* 7.5*   HCT 26.2* 25.2* 25.6* 25.5*   MCV 94 92 93 94   MCH 27.7 27.8 27.3 27.7   MCHC 29.4* 30.2* 29.3* 29.4*   RDW 16.7* 16.4* 16.4* 16.2*    205 196 174     Hepatic Panel   Lab Results   Component Value Date    AST 20 07/01/2018     Lab Results   Component Value Date    ALT 26 07/01/2018     Lab Results   Component Value Date    ALBUMIN 3.1 07/01/2018     GLUCOSE:     Recent Labs  Lab 07/12/18  0715 07/12/18  0612 07/12/18  0207 07/11/18  2242 07/11/18  1825 07/11/18  1258 07/11/18  0859 07/11/18  0611  07/10/18  0603  07/09/18  0555  07/08/18  0447  07/07/18  0546   GLC  --  114*  --   --   --   --   --  142*  --  83  --  78  --  202*  --  129*   *  --  118* 192* 134* 153* 125*  --   < >  --   < >  --   < >  --   < >  --    < > = values in this interval not displayed.      Imaging:  reviewed recent imaging      A/P:   Mariel Ribeiro is a 73 year old female with a history of DM2, HTN, CKD3, HLD, COPD, fibromyalgia, anxiety, depression, lymphedema, and CAD now s/p CABG x 3 on 7/2/2018 with Dr. Mason.    Neuro:  - Neuro status intact  - Acute post-op pain: tylenol  - discontinued flexeril and changed to robaxin for muscle spasms  - PTA lexapro and pregablin    Musc:  - Shoulder pain - hx of shoulder dislocations, will obtain shoulder XR 7/12    CV:  -  mg, atorvastatin 40 mg daily, metoprolol 75 mg BID, hydralazine 50 mg TID  - 31 beat run of SVT 7/12, increased BB, continue to monitor  - will need to be on ace inh/arb due to hx of DM, hold for now given slight rise in creatinine, can start as outpatient  - Bilateral groin harvest sites with staples, clean, dry, and intact.  Staples x 2 weeks, remove 7/16.       Resp:  - Extubated POD 2  - IS, encourage activity  - CPAP overnight 10/5 RR 12.  Hx COPD and needing albuterol MDI 2-4 times per day at home for wheezing/SOB,  started Breo Ellipta 7/10 for long term symptom control.   - Bilateral lower pleural effusions, aggressive pulmonary toilet   - nebs qid, acapella valve      FEN/GI:   - Continue reg and mod carb DM diet, + BM     Renal:   - CKD 3  - Creatinine 1.31->1.46, at baseline 1.04, adequate UOP  - Volume status: hypervolemic, dry weight ~ 302 lbs, current weight 313 lbs.  - 7/9 Bumex 3 mg IV BID, good response, switched to PO bumex 3 mg PO BID 7/11, good UOP overnight, net neg 2L, down 5 pounds, slight creatinine and CO2 rise, will decrease bumex to 2 mg PO BID. (PTA diuretic was Lasix 80 mg BID)  - Lymphedema wraps       ID:   - Completed ralph-op abx  - WBC WNL, afebrile, no signs or symptoms of infection  - UA 7/11 obtained for dysuria, showed large LE and 63 WBC, will start cipro, f/u on urine culture     Heme:   - Hgb stable mid 7's.  No signs or symptoms of bleeding      Endo:   - DM2  - 7/6 endocrine consult, 7/8 increased glargine, aspart for meals and snacks, and aspart correction increased to very high intensity ac and hs  - PTA levothyroxine    PPx:   - PPI, 5000 units SQ heparin      Anticoagulation:   - ASA therapy  - HIT negative, Plts WNL and trending up. No signs or symptoms of bleeding       Dispo:   - 6C since 7/5  - Therapy recommending d/c to ARU, looking at discharge on Friday, medically ready    Encouraged IS/TCDB/amb. Sternal precautions. Ok to shower. Continue to monitor    Discussed with CVTS Fellow   Staff surgeons have been informed of changes through both  verbal and written communication.      Kb Jeffries PA-C  Cardiothoracic Surgery  July 12, 2018 8:54 AM   p: [phone]

## 2018-07-12 NOTE — PLAN OF CARE
Problem: Patient Care Overview  Goal: Plan of Care/Patient Progress Review  RN:  1.Pt will remain hemodynamically stable.    2.Tropinin/EKG WNL.     D: Pt admitted with CP, found to have severe 3 vessel disease, now s/p CABG x3 7/2/18. Hx DM 2, CKD, HTN, BELEN, COPD, anxiety, depression and lymphedema.      I/A: Vital signs stable, afebrile, A&Ox4. Multiple runs of afib with RVR vs SVT during evening (see provider notification). NSR rest of shift after scheduled PO metoprolol and K replacement. Scheduled tylenol and lidocaine patch given for lower back pain. Incisions and dressings CDI (Stapled incision on R upper leg changed d/t drainage.). BG WDL overnight. Lymphedema wraps in place. Up with SBA to commode, voiding well. Incontinence brief on for urgency. Slept between cares on BiPAP/NC @ 1LPM.      P: D/C to ARU possibly on Friday. Possible cardiology consult today for arrhythmia. Continue to monitor and notify CVTS with pertinent changes.

## 2018-07-12 NOTE — PROGRESS NOTES
"SPIRITUAL HEALTH SERVICES  SPIRITUAL ASSESSMENT Progress Note  [facility] 6C     REFERRAL SOURCE: Follow-up    Pt declined visit at this time say she is \"all set\".    PLAN: no follow planned at this time, SHS remains available upon request     Ann Marie Mills   Intern  Pager [phone]    "

## 2018-07-12 NOTE — PROGRESS NOTES
CLINICAL NUTRITION SERVICES - REASSESSMENT NOTE     Nutrition Prescription    RECOMMENDATIONS FOR MDs/PROVIDERS TO ORDER:  None at this time.     Recommendations already ordered by Registered Dietitian (RD):  Modified oral supplements    Future/Additional Recommendations:  1. Continue current diet as ordered. If oral intake continues to be adequate, then add a cardiac diet restriction as a combination diet.   2. Monitor BG control. Hgb A1c of 9.3 on 7/1/18. DM II hx. Endo team is following.   3. Consider checking vitamin D status as h/o vitamin D deficiency (vitamin D deficiency lab of 40 on 2/17/18).  4. Monitor possible need to adjust miralax to prn.     EVALUATION OF THE PROGRESS TOWARD GOALS   Diet: Moderate Consistent Carbohydrate diet order since 7/11 (previously NPO 7/2, nectar-thick full liquids 7/6-7/11, full liquids 7/11). SLP is following. Extubated on 7/3.   Intake: Good diet tolerance. Flowsheets indicate pt consuming % of meals with a good appetite 7/6 and 100% of meals 7/9-7/11. Per pt, her appetite was good PTA but is not as good currently (suspect pt with less desire to eat). She did consume 100% of her breakfast (413 kcals and 25 g protein). Per pt, was eating less while on the full liquid diet. [employer] indicates food/beverages sent 7/11 totaled 2030 kcals and 174 g protein. Suspect meeting estimated needs of 1298 - 1475+ kcals/day (22 - 25+ kcals/kg) and 71 - 89 grams protein/day (1.2 - 1.5+ grams of pro/kg).       NEW FINDINGS   N/A    MALNUTRITION  % Intake: Not meeting this criteria.     % Weight Loss: Not meeting this criteria.     Subcutaneous Fat Loss: None observed, but difficult to assess with body habitus   Muscle Loss:None observed, but difficult to assess with body habitus  Fluid Accumulation/Edema: Moderate  Malnutrition Diagnosis: Patient does not meet two of the above criteria necessary for diagnosing malnutrition but is at risk for malnutrition    Previous Goals   Diet adv  v nutrition support within 2-3 days.  Evaluation: Not met    Previous Nutrition Diagnosis  Inadequate protein-energy intake related to NPO status 2/2 unsafe swallow as evidenced by no PO intake x past 3 days.   Evaluation: Improved.    CURRENT NUTRITION DIAGNOSIS  Predicted inadequate nutrient intake (protein-energy) related to decreased appetite and recent diet advancement post-op.    INTERVENTIONS  Implementation  Nutrition education for nutrition relationship to health/disease: Provided brief verbal instruction on a heart-healthy diet, consistent carbohydrate diet, and carbohydrate counting. Therapy x2 in room to see pt. Provided written handouts on Carbohydrate Counting, Living with Diabetes, and Nutrition Care Manual handout on Heart-Healthy Eating Nutrition Therapy.    Medical food supplement therapy: Discontinued Gelatein as per pt preference. Modified oral supplement to send strawberry (or vanilla) Boost Glucose Control at 14:00.     Goals  Patient to consume 75% of nutritionally adequate meal trays TID, or the equivalent with supplements/snacks.    Monitoring/Evaluation  Progress toward goals will be monitored and evaluated per protocol.      Nutrition will continue to follow.      Sherrie Bonner, MS, RD, LD, [facility]   6C Pgr: [phone]

## 2018-07-12 NOTE — PLAN OF CARE
Problem: Patient Care Overview  Goal: Plan of Care/Patient Progress Review  RN:  1.Pt will remain hemodynamically stable.    2.Tropinin/EKG WNL.   Discharge Planner PT   Patient plan for discharge: Rehab   Current status: Pt fatigued today and with pain in R shoulder not wanting to ambulate OOR.  Pt completed several bouts of marching in place and forward stepping, needing to take seated rest breaks between bouts due to fatigue.   Barriers to return to prior living situation: Need for increased assist with bed mobility and ambulation within precautions, LE weakness, stairs, poor activity tolerance  Recommendations for discharge: ARU  Rationale for recommendations: To increase strength and IND with functional mobility       Entered by: Aileen Barillas 07/12/2018 11:47 AM

## 2018-07-13 ENCOUNTER — APPOINTMENT (OUTPATIENT)
Dept: SPEECH THERAPY | Facility: CLINIC | Age: 73
DRG: 234 | End: 2018-07-13
Payer: MEDICARE

## 2018-07-13 ENCOUNTER — APPOINTMENT (OUTPATIENT)
Dept: OCCUPATIONAL THERAPY | Facility: CLINIC | Age: 73
DRG: 234 | End: 2018-07-13
Payer: MEDICARE

## 2018-07-13 ENCOUNTER — HOSPITAL ENCOUNTER (INPATIENT)
Facility: CLINIC | Age: 73
LOS: 10 days | Discharge: HOME HEALTH | DRG: 949 | End: 2018-07-23
Attending: PHYSICAL MEDICINE & REHABILITATION | Admitting: PHYSICAL MEDICINE & REHABILITATION
Payer: MEDICARE

## 2018-07-13 VITALS
OXYGEN SATURATION: 97 % | HEIGHT: 66 IN | TEMPERATURE: 98.1 F | BODY MASS INDEX: 47.09 KG/M2 | DIASTOLIC BLOOD PRESSURE: 56 MMHG | WEIGHT: 293 LBS | SYSTOLIC BLOOD PRESSURE: 143 MMHG | HEART RATE: 89 BPM | RESPIRATION RATE: 18 BRPM

## 2018-07-13 DIAGNOSIS — M25.512 PAIN IN JOINT OF LEFT SHOULDER: Primary | ICD-10-CM

## 2018-07-13 DIAGNOSIS — J44.9 CHRONIC OBSTRUCTIVE PULMONARY DISEASE, UNSPECIFIED COPD TYPE (H): ICD-10-CM

## 2018-07-13 DIAGNOSIS — E03.8 OTHER SPECIFIED HYPOTHYROIDISM: ICD-10-CM

## 2018-07-13 DIAGNOSIS — Z95.1 S/P CABG X 3: ICD-10-CM

## 2018-07-13 DIAGNOSIS — E11.22 TYPE 2 DIABETES MELLITUS WITH DIABETIC CHRONIC KIDNEY DISEASE, UNSPECIFIED CKD STAGE, UNSPECIFIED LONG TERM INSULIN USE STATUS: ICD-10-CM

## 2018-07-13 DIAGNOSIS — I25.10 ATHEROSCLEROSIS OF NATIVE CORONARY ARTERY WITHOUT ANGINA PECTORIS, UNSPECIFIED WHETHER NATIVE OR TRANSPLANTED HEART: ICD-10-CM

## 2018-07-13 DIAGNOSIS — E55.9 VITAMIN D DEFICIENCY: ICD-10-CM

## 2018-07-13 DIAGNOSIS — F33.41 RECURRENT MAJOR DEPRESSIVE DISORDER, IN PARTIAL REMISSION (H): ICD-10-CM

## 2018-07-13 DIAGNOSIS — M19.012 OSTEOARTHRITIS OF LEFT SHOULDER, UNSPECIFIED OSTEOARTHRITIS TYPE: ICD-10-CM

## 2018-07-13 LAB
ANION GAP SERPL CALCULATED.3IONS-SCNC: 7 MMOL/L (ref 3–14)
BACTERIA SPEC CULT: ABNORMAL
BACTERIA SPEC CULT: ABNORMAL
BUN SERPL-MCNC: 37 MG/DL (ref 7–30)
CALCIUM SERPL-MCNC: 8.7 MG/DL (ref 8.5–10.1)
CHLORIDE SERPL-SCNC: 102 MMOL/L (ref 94–109)
CO2 SERPL-SCNC: 30 MMOL/L (ref 20–32)
CREAT SERPL-MCNC: 1.41 MG/DL (ref 0.52–1.04)
ERYTHROCYTE [DISTWIDTH] IN BLOOD BY AUTOMATED COUNT: 16.6 % (ref 10–15)
GFR SERPL CREATININE-BSD FRML MDRD: 37 ML/MIN/1.7M2
GLUCOSE BLDC GLUCOMTR-MCNC: 123 MG/DL (ref 70–99)
GLUCOSE BLDC GLUCOMTR-MCNC: 149 MG/DL (ref 70–99)
GLUCOSE BLDC GLUCOMTR-MCNC: 172 MG/DL (ref 70–99)
GLUCOSE BLDC GLUCOMTR-MCNC: 174 MG/DL (ref 70–99)
GLUCOSE BLDC GLUCOMTR-MCNC: 301 MG/DL (ref 70–99)
GLUCOSE SERPL-MCNC: 130 MG/DL (ref 70–99)
HCT VFR BLD AUTO: 25.3 % (ref 35–47)
HGB BLD-MCNC: 7.5 G/DL (ref 11.7–15.7)
MAGNESIUM SERPL-MCNC: 1.8 MG/DL (ref 1.6–2.3)
MCH RBC QN AUTO: 27.6 PG (ref 26.5–33)
MCHC RBC AUTO-ENTMCNC: 29.6 G/DL (ref 31.5–36.5)
MCV RBC AUTO: 93 FL (ref 78–100)
PHOSPHATE SERPL-MCNC: 4.1 MG/DL (ref 2.5–4.5)
PLATELET # BLD AUTO: 232 10E9/L (ref 150–450)
POTASSIUM SERPL-SCNC: 3.6 MMOL/L (ref 3.4–5.3)
RBC # BLD AUTO: 2.72 10E12/L (ref 3.8–5.2)
SODIUM SERPL-SCNC: 140 MMOL/L (ref 133–144)
SPECIMEN SOURCE: ABNORMAL
WBC # BLD AUTO: 8.7 10E9/L (ref 4–11)

## 2018-07-13 PROCEDURE — 36592 COLLECT BLOOD FROM PICC: CPT | Performed by: NURSE PRACTITIONER

## 2018-07-13 PROCEDURE — 83735 ASSAY OF MAGNESIUM: CPT | Performed by: NURSE PRACTITIONER

## 2018-07-13 PROCEDURE — 25000128 H RX IP 250 OP 636: Performed by: NURSE PRACTITIONER

## 2018-07-13 PROCEDURE — 00000146 ZZHCL STATISTIC GLUCOSE BY METER IP

## 2018-07-13 PROCEDURE — 80048 BASIC METABOLIC PNL TOTAL CA: CPT | Performed by: NURSE PRACTITIONER

## 2018-07-13 PROCEDURE — 25000132 ZZH RX MED GY IP 250 OP 250 PS 637: Mod: GY | Performed by: STUDENT IN AN ORGANIZED HEALTH CARE EDUCATION/TRAINING PROGRAM

## 2018-07-13 PROCEDURE — A9270 NON-COVERED ITEM OR SERVICE: HCPCS | Mod: GY | Performed by: STUDENT IN AN ORGANIZED HEALTH CARE EDUCATION/TRAINING PROGRAM

## 2018-07-13 PROCEDURE — 25000131 ZZH RX MED GY IP 250 OP 636 PS 637: Mod: GY | Performed by: STUDENT IN AN ORGANIZED HEALTH CARE EDUCATION/TRAINING PROGRAM

## 2018-07-13 PROCEDURE — 84100 ASSAY OF PHOSPHORUS: CPT | Performed by: NURSE PRACTITIONER

## 2018-07-13 PROCEDURE — 40000275 ZZH STATISTIC RCP TIME EA 10 MIN

## 2018-07-13 PROCEDURE — 25000132 ZZH RX MED GY IP 250 OP 250 PS 637: Mod: GY | Performed by: NURSE PRACTITIONER

## 2018-07-13 PROCEDURE — 92526 ORAL FUNCTION THERAPY: CPT | Mod: GN | Performed by: SPEECH-LANGUAGE PATHOLOGIST

## 2018-07-13 PROCEDURE — 94660 CPAP INITIATION&MGMT: CPT

## 2018-07-13 PROCEDURE — 94640 AIRWAY INHALATION TREATMENT: CPT

## 2018-07-13 PROCEDURE — 85027 COMPLETE CBC AUTOMATED: CPT | Performed by: NURSE PRACTITIONER

## 2018-07-13 PROCEDURE — 94640 AIRWAY INHALATION TREATMENT: CPT | Mod: 76

## 2018-07-13 PROCEDURE — A9270 NON-COVERED ITEM OR SERVICE: HCPCS | Mod: GY | Performed by: THORACIC SURGERY (CARDIOTHORACIC VASCULAR SURGERY)

## 2018-07-13 PROCEDURE — A9270 NON-COVERED ITEM OR SERVICE: HCPCS | Mod: GY | Performed by: PHYSICAL MEDICINE & REHABILITATION

## 2018-07-13 PROCEDURE — 25000132 ZZH RX MED GY IP 250 OP 250 PS 637: Mod: GY | Performed by: PHYSICIAN ASSISTANT

## 2018-07-13 PROCEDURE — 97140 MANUAL THERAPY 1/> REGIONS: CPT | Mod: GO | Performed by: OCCUPATIONAL THERAPIST

## 2018-07-13 PROCEDURE — 25000125 ZZHC RX 250: Performed by: STUDENT IN AN ORGANIZED HEALTH CARE EDUCATION/TRAINING PROGRAM

## 2018-07-13 PROCEDURE — 25000132 ZZH RX MED GY IP 250 OP 250 PS 637: Mod: GY | Performed by: PHYSICAL MEDICINE & REHABILITATION

## 2018-07-13 PROCEDURE — 25000125 ZZHC RX 250: Performed by: THORACIC SURGERY (CARDIOTHORACIC VASCULAR SURGERY)

## 2018-07-13 PROCEDURE — 40000133 ZZH STATISTIC OT WARD VISIT: Performed by: OCCUPATIONAL THERAPIST

## 2018-07-13 PROCEDURE — A9270 NON-COVERED ITEM OR SERVICE: HCPCS | Mod: GY | Performed by: PHYSICIAN ASSISTANT

## 2018-07-13 PROCEDURE — 12800006 ZZH R&B REHAB

## 2018-07-13 PROCEDURE — 25000128 H RX IP 250 OP 636: Performed by: STUDENT IN AN ORGANIZED HEALTH CARE EDUCATION/TRAINING PROGRAM

## 2018-07-13 PROCEDURE — 25000128 H RX IP 250 OP 636: Performed by: PHYSICIAN ASSISTANT

## 2018-07-13 PROCEDURE — A9270 NON-COVERED ITEM OR SERVICE: HCPCS | Mod: GY | Performed by: NURSE PRACTITIONER

## 2018-07-13 PROCEDURE — 40000225 ZZH STATISTIC SLP WARD VISIT: Performed by: SPEECH-LANGUAGE PATHOLOGIST

## 2018-07-13 PROCEDURE — 25000132 ZZH RX MED GY IP 250 OP 250 PS 637: Mod: GY | Performed by: THORACIC SURGERY (CARDIOTHORACIC VASCULAR SURGERY)

## 2018-07-13 PROCEDURE — 40000802 ZZH SITE CHECK

## 2018-07-13 RX ORDER — HEPARIN SODIUM 5000 [USP'U]/.5ML
5000 INJECTION, SOLUTION INTRAVENOUS; SUBCUTANEOUS EVERY 8 HOURS
Status: DISCONTINUED | OUTPATIENT
Start: 2018-07-13 | End: 2018-07-19

## 2018-07-13 RX ORDER — LANOLIN ALCOHOL/MO/W.PET/CERES
800 CREAM (GRAM) TOPICAL DAILY
Status: DISCONTINUED | OUTPATIENT
Start: 2018-07-14 | End: 2018-07-23 | Stop reason: HOSPADM

## 2018-07-13 RX ORDER — CYCLOBENZAPRINE HCL 10 MG
10 TABLET ORAL 3 TIMES DAILY PRN
Status: DISCONTINUED | OUTPATIENT
Start: 2018-07-13 | End: 2018-07-23 | Stop reason: HOSPADM

## 2018-07-13 RX ORDER — POTASSIUM CHLORIDE 1500 MG/1
60 TABLET, EXTENDED RELEASE ORAL 2 TIMES DAILY
Status: DISCONTINUED | OUTPATIENT
Start: 2018-07-13 | End: 2018-07-14

## 2018-07-13 RX ORDER — GUAIFENESIN 600 MG/1
600 TABLET, EXTENDED RELEASE ORAL 2 TIMES DAILY
Status: DISCONTINUED | OUTPATIENT
Start: 2018-07-13 | End: 2018-07-23 | Stop reason: HOSPADM

## 2018-07-13 RX ORDER — METOPROLOL TARTRATE 75 MG/1
75 TABLET, FILM COATED ORAL 2 TIMES DAILY
Qty: 60 TABLET | Status: ON HOLD | DISCHARGE
Start: 2018-07-13 | End: 2018-07-22

## 2018-07-13 RX ORDER — NITROGLYCERIN 0.4 MG/1
0.4 TABLET SUBLINGUAL EVERY 5 MIN PRN
Status: DISCONTINUED | OUTPATIENT
Start: 2018-07-13 | End: 2018-07-23 | Stop reason: HOSPADM

## 2018-07-13 RX ORDER — HYDROMORPHONE HYDROCHLORIDE 2 MG/1
2-4 TABLET ORAL
Qty: 30 TABLET | Refills: 0 | Status: ON HOLD | DISCHARGE
Start: 2018-07-13 | End: 2018-07-20

## 2018-07-13 RX ORDER — LANOLIN ALCOHOL/MO/W.PET/CERES
3 CREAM (GRAM) TOPICAL AT BEDTIME
Status: ON HOLD | DISCHARGE
Start: 2018-07-13 | End: 2018-07-22

## 2018-07-13 RX ORDER — FEBUXOSTAT 40 MG/1
40 TABLET, FILM COATED ORAL EVERY EVENING
Status: DISCONTINUED | OUTPATIENT
Start: 2018-07-13 | End: 2018-07-23 | Stop reason: HOSPADM

## 2018-07-13 RX ORDER — TRAZODONE HYDROCHLORIDE 50 MG/1
150 TABLET, FILM COATED ORAL AT BEDTIME
Status: DISCONTINUED | OUTPATIENT
Start: 2018-07-13 | End: 2018-07-23 | Stop reason: HOSPADM

## 2018-07-13 RX ORDER — PANTOPRAZOLE SODIUM 40 MG/1
40 TABLET, DELAYED RELEASE ORAL
Qty: 30 TABLET | Status: ON HOLD | DISCHARGE
Start: 2018-07-14 | End: 2018-07-22

## 2018-07-13 RX ORDER — POLYETHYLENE GLYCOL 3350 17 G/17G
17 POWDER, FOR SOLUTION ORAL DAILY
Qty: 7 PACKET | Status: ON HOLD | DISCHARGE
Start: 2018-07-14 | End: 2018-07-22

## 2018-07-13 RX ORDER — POTASSIUM CHLORIDE 1500 MG/1
60 TABLET, EXTENDED RELEASE ORAL 2 TIMES DAILY
Qty: 60 TABLET | Status: ON HOLD | DISCHARGE
Start: 2018-07-13 | End: 2018-07-22

## 2018-07-13 RX ORDER — NIACIN 500 MG/1
500 TABLET, EXTENDED RELEASE ORAL 2 TIMES DAILY
Status: DISCONTINUED | OUTPATIENT
Start: 2018-07-13 | End: 2018-07-14

## 2018-07-13 RX ORDER — ALBUTEROL SULFATE 0.83 MG/ML
2.5 SOLUTION RESPIRATORY (INHALATION) 4 TIMES DAILY
Status: DISCONTINUED | OUTPATIENT
Start: 2018-07-13 | End: 2018-07-13

## 2018-07-13 RX ORDER — SALIVA STIMULANT COMB. NO.3
1 SPRAY, NON-AEROSOL (ML) MUCOUS MEMBRANE 4 TIMES DAILY PRN
Status: DISCONTINUED | OUTPATIENT
Start: 2018-07-13 | End: 2018-07-23 | Stop reason: HOSPADM

## 2018-07-13 RX ORDER — ACETAMINOPHEN 325 MG/1
650 TABLET ORAL EVERY 4 HOURS PRN
Qty: 100 TABLET | Status: ON HOLD | DISCHARGE
Start: 2018-07-13 | End: 2019-05-02

## 2018-07-13 RX ORDER — METOPROLOL TARTRATE 25 MG/1
75 TABLET, FILM COATED ORAL 2 TIMES DAILY
Status: DISCONTINUED | OUTPATIENT
Start: 2018-07-13 | End: 2018-07-23 | Stop reason: HOSPADM

## 2018-07-13 RX ORDER — EPINEPHRINE 0.3 MG/.3ML
0.3 INJECTION SUBCUTANEOUS
Status: DISCONTINUED | OUTPATIENT
Start: 2018-07-13 | End: 2018-07-13

## 2018-07-13 RX ORDER — PREGABALIN 100 MG/1
100 CAPSULE ORAL 2 TIMES DAILY
Status: DISCONTINUED | OUTPATIENT
Start: 2018-07-13 | End: 2018-07-23 | Stop reason: HOSPADM

## 2018-07-13 RX ORDER — NALOXONE HYDROCHLORIDE 0.4 MG/ML
.1-.4 INJECTION, SOLUTION INTRAMUSCULAR; INTRAVENOUS; SUBCUTANEOUS
Status: DISCONTINUED | OUTPATIENT
Start: 2018-07-13 | End: 2018-07-23 | Stop reason: HOSPADM

## 2018-07-13 RX ORDER — BUMETANIDE 2 MG/1
2 TABLET ORAL
Status: DISCONTINUED | OUTPATIENT
Start: 2018-07-13 | End: 2018-07-16

## 2018-07-13 RX ORDER — DEXTROSE MONOHYDRATE 25 G/50ML
25-50 INJECTION, SOLUTION INTRAVENOUS
Status: DISCONTINUED | OUTPATIENT
Start: 2018-07-13 | End: 2018-07-13

## 2018-07-13 RX ORDER — AMOXICILLIN 250 MG
1-2 CAPSULE ORAL 2 TIMES DAILY PRN
Status: DISCONTINUED | OUTPATIENT
Start: 2018-07-13 | End: 2018-07-23 | Stop reason: HOSPADM

## 2018-07-13 RX ORDER — CIPROFLOXACIN 250 MG/1
250 TABLET, FILM COATED ORAL EVERY 12 HOURS
Refills: 0 | Status: ON HOLD | DISCHARGE
Start: 2018-07-13 | End: 2018-07-22

## 2018-07-13 RX ORDER — HYDRALAZINE HYDROCHLORIDE 25 MG/1
75 TABLET, FILM COATED ORAL 3 TIMES DAILY
Qty: 60 TABLET | Status: ON HOLD | DISCHARGE
Start: 2018-07-13 | End: 2018-07-22

## 2018-07-13 RX ORDER — ESCITALOPRAM OXALATE 20 MG/1
20 TABLET ORAL EVERY MORNING
Status: DISCONTINUED | OUTPATIENT
Start: 2018-07-14 | End: 2018-07-23 | Stop reason: HOSPADM

## 2018-07-13 RX ORDER — LANOLIN ALCOHOL/MO/W.PET/CERES
3 CREAM (GRAM) TOPICAL AT BEDTIME
Status: DISCONTINUED | OUTPATIENT
Start: 2018-07-13 | End: 2018-07-23 | Stop reason: HOSPADM

## 2018-07-13 RX ORDER — ALBUTEROL SULFATE 0.83 MG/ML
2.5 SOLUTION RESPIRATORY (INHALATION) EVERY 6 HOURS PRN
Status: DISCONTINUED | OUTPATIENT
Start: 2018-07-13 | End: 2018-07-23 | Stop reason: HOSPADM

## 2018-07-13 RX ORDER — HEPARIN SODIUM,PORCINE 10 UNIT/ML
5-10 VIAL (ML) INTRAVENOUS
Status: DISCONTINUED | OUTPATIENT
Start: 2018-07-13 | End: 2018-07-15 | Stop reason: CLARIF

## 2018-07-13 RX ORDER — BUMETANIDE 2 MG/1
2 TABLET ORAL
Status: ON HOLD | DISCHARGE
Start: 2018-07-13 | End: 2018-07-22

## 2018-07-13 RX ORDER — ALBUTEROL SULFATE 90 UG/1
1-2 AEROSOL, METERED RESPIRATORY (INHALATION) EVERY 4 HOURS PRN
Status: DISCONTINUED | OUTPATIENT
Start: 2018-07-13 | End: 2018-07-23 | Stop reason: HOSPADM

## 2018-07-13 RX ORDER — NICOTINE POLACRILEX 4 MG
15-30 LOZENGE BUCCAL
Status: DISCONTINUED | OUTPATIENT
Start: 2018-07-13 | End: 2018-07-13

## 2018-07-13 RX ORDER — HYDROMORPHONE HYDROCHLORIDE 2 MG/1
2 TABLET ORAL EVERY 8 HOURS PRN
Status: DISCONTINUED | OUTPATIENT
Start: 2018-07-13 | End: 2018-07-23 | Stop reason: HOSPADM

## 2018-07-13 RX ORDER — POLYETHYLENE GLYCOL 3350 17 G/17G
17 POWDER, FOR SOLUTION ORAL DAILY
Status: DISCONTINUED | OUTPATIENT
Start: 2018-07-14 | End: 2018-07-23 | Stop reason: HOSPADM

## 2018-07-13 RX ORDER — PANTOPRAZOLE SODIUM 40 MG/1
40 TABLET, DELAYED RELEASE ORAL
Status: DISCONTINUED | OUTPATIENT
Start: 2018-07-14 | End: 2018-07-23 | Stop reason: HOSPADM

## 2018-07-13 RX ORDER — HEPARIN SODIUM,PORCINE 10 UNIT/ML
5-10 VIAL (ML) INTRAVENOUS EVERY 24 HOURS
Status: DISCONTINUED | OUTPATIENT
Start: 2018-07-13 | End: 2018-07-15 | Stop reason: CLARIF

## 2018-07-13 RX ORDER — CIPROFLOXACIN 250 MG/1
250 TABLET, FILM COATED ORAL EVERY 12 HOURS
Status: COMPLETED | OUTPATIENT
Start: 2018-07-13 | End: 2018-07-20

## 2018-07-13 RX ORDER — ACETAMINOPHEN 325 MG/1
325 TABLET ORAL EVERY 8 HOURS
Status: DISCONTINUED | OUTPATIENT
Start: 2018-07-13 | End: 2018-07-14

## 2018-07-13 RX ORDER — ALBUTEROL SULFATE 0.83 MG/ML
2.5 SOLUTION RESPIRATORY (INHALATION) 4 TIMES DAILY
Qty: 360 ML | Status: ON HOLD | DISCHARGE
Start: 2018-07-13 | End: 2018-07-22

## 2018-07-13 RX ORDER — DEXTROSE MONOHYDRATE 25 G/50ML
25-50 INJECTION, SOLUTION INTRAVENOUS
Status: DISCONTINUED | OUTPATIENT
Start: 2018-07-13 | End: 2018-07-23 | Stop reason: HOSPADM

## 2018-07-13 RX ORDER — SALIVA STIMULANT COMB. NO.3
1 SPRAY, NON-AEROSOL (ML) MUCOUS MEMBRANE 4 TIMES DAILY PRN
Status: ON HOLD | DISCHARGE
Start: 2018-07-13 | End: 2018-07-22

## 2018-07-13 RX ORDER — NICOTINE POLACRILEX 4 MG
15-30 LOZENGE BUCCAL
Status: DISCONTINUED | OUTPATIENT
Start: 2018-07-13 | End: 2018-07-23 | Stop reason: HOSPADM

## 2018-07-13 RX ORDER — LIDOCAINE 40 MG/G
CREAM TOPICAL
Status: DISCONTINUED | OUTPATIENT
Start: 2018-07-13 | End: 2018-07-23 | Stop reason: HOSPADM

## 2018-07-13 RX ORDER — ATORVASTATIN CALCIUM 40 MG/1
40 TABLET, FILM COATED ORAL DAILY
Status: DISCONTINUED | OUTPATIENT
Start: 2018-07-13 | End: 2018-07-23 | Stop reason: HOSPADM

## 2018-07-13 RX ORDER — METOPROLOL TARTRATE 25 MG/1
75 TABLET, FILM COATED ORAL 2 TIMES DAILY
Status: DISCONTINUED | OUTPATIENT
Start: 2018-07-13 | End: 2018-07-13

## 2018-07-13 RX ADMIN — INSULIN ASPART 13 UNITS: 100 INJECTION, SOLUTION INTRAVENOUS; SUBCUTANEOUS at 11:22

## 2018-07-13 RX ADMIN — ALBUTEROL SULFATE 2.5 MG: 2.5 SOLUTION RESPIRATORY (INHALATION) at 08:23

## 2018-07-13 RX ADMIN — Medication 500 MG: at 09:13

## 2018-07-13 RX ADMIN — HYDRALAZINE HYDROCHLORIDE 75 MG: 50 TABLET ORAL at 19:39

## 2018-07-13 RX ADMIN — FLUTICASONE FUROATE AND VILANTEROL TRIFENATATE 1 PUFF: 100; 25 POWDER RESPIRATORY (INHALATION) at 09:18

## 2018-07-13 RX ADMIN — HEPARIN SODIUM 5000 UNITS: 5000 INJECTION, SOLUTION INTRAVENOUS; SUBCUTANEOUS at 09:11

## 2018-07-13 RX ADMIN — SENNOSIDES AND DOCUSATE SODIUM 2 TABLET: 8.6; 5 TABLET ORAL at 09:13

## 2018-07-13 RX ADMIN — FEBUXOSTAT 40 MG: 40 TABLET ORAL at 22:33

## 2018-07-13 RX ADMIN — SODIUM CHLORIDE, PRESERVATIVE FREE 5 ML: 5 INJECTION INTRAVENOUS at 05:06

## 2018-07-13 RX ADMIN — PREGABALIN 100 MG: 100 CAPSULE ORAL at 22:35

## 2018-07-13 RX ADMIN — HYDRALAZINE HYDROCHLORIDE 75 MG: 25 TABLET ORAL at 09:12

## 2018-07-13 RX ADMIN — ACETAMINOPHEN 325 MG: 325 TABLET, FILM COATED ORAL at 15:39

## 2018-07-13 RX ADMIN — BUMETANIDE 2 MG: 2 TABLET ORAL at 09:11

## 2018-07-13 RX ADMIN — SODIUM CHLORIDE, PRESERVATIVE FREE 5 ML: 5 INJECTION INTRAVENOUS at 19:40

## 2018-07-13 RX ADMIN — CIPROFLOXACIN HYDROCHLORIDE 250 MG: 250 TABLET, FILM COATED ORAL at 09:11

## 2018-07-13 RX ADMIN — ASPIRIN 325 MG ORAL TABLET 325 MG: 325 PILL ORAL at 09:11

## 2018-07-13 RX ADMIN — POLYETHYLENE GLYCOL 3350 17 G: 17 POWDER, FOR SOLUTION ORAL at 09:11

## 2018-07-13 RX ADMIN — Medication 3 MG: at 22:31

## 2018-07-13 RX ADMIN — POTASSIUM CHLORIDE 60 MEQ: 1500 TABLET, EXTENDED RELEASE ORAL at 09:13

## 2018-07-13 RX ADMIN — TRAZODONE HYDROCHLORIDE 150 MG: 50 TABLET ORAL at 22:30

## 2018-07-13 RX ADMIN — METOPROLOL TARTRATE 75 MG: 25 TABLET, FILM COATED ORAL at 22:31

## 2018-07-13 RX ADMIN — ACETAMINOPHEN 325 MG: 325 TABLET, FILM COATED ORAL at 23:57

## 2018-07-13 RX ADMIN — ACETAMINOPHEN 650 MG: 325 TABLET, FILM COATED ORAL at 04:14

## 2018-07-13 RX ADMIN — INSULIN HUMAN 30 UNITS: 100 INJECTION, SUSPENSION SUBCUTANEOUS at 19:40

## 2018-07-13 RX ADMIN — GUAIFENESIN 600 MG: 600 TABLET, EXTENDED RELEASE ORAL at 09:13

## 2018-07-13 RX ADMIN — ATORVASTATIN CALCIUM 40 MG: 40 TABLET, FILM COATED ORAL at 19:39

## 2018-07-13 RX ADMIN — PANTOPRAZOLE SODIUM 40 MG: 40 TABLET, DELAYED RELEASE ORAL at 09:13

## 2018-07-13 RX ADMIN — ESCITALOPRAM 20 MG: 20 TABLET, FILM COATED ORAL at 09:11

## 2018-07-13 RX ADMIN — ALBUTEROL SULFATE 2.5 MG: 2.5 SOLUTION RESPIRATORY (INHALATION) at 12:28

## 2018-07-13 RX ADMIN — ALBUTEROL SULFATE 2.5 MG: 2.5 SOLUTION RESPIRATORY (INHALATION) at 16:32

## 2018-07-13 RX ADMIN — BUMETANIDE 2 MG: 2 TABLET ORAL at 15:40

## 2018-07-13 RX ADMIN — PREGABALIN 100 MG: 100 CAPSULE ORAL at 09:13

## 2018-07-13 RX ADMIN — HEPARIN SODIUM 5000 UNITS: 5000 INJECTION, SOLUTION INTRAVENOUS; SUBCUTANEOUS at 19:39

## 2018-07-13 RX ADMIN — LEVOTHYROXINE SODIUM 150 MCG: 150 TABLET ORAL at 09:11

## 2018-07-13 RX ADMIN — GUAIFENESIN 600 MG: 600 TABLET, EXTENDED RELEASE ORAL at 22:34

## 2018-07-13 RX ADMIN — INSULIN ASPART 1 UNITS: 100 INJECTION, SOLUTION INTRAVENOUS; SUBCUTANEOUS at 19:40

## 2018-07-13 RX ADMIN — INSULIN ASPART 5 UNITS: 100 INJECTION, SOLUTION INTRAVENOUS; SUBCUTANEOUS at 22:35

## 2018-07-13 RX ADMIN — METOPROLOL TARTRATE 75 MG: 25 TABLET, FILM COATED ORAL at 09:13

## 2018-07-13 RX ADMIN — CIPROFLOXACIN HYDROCHLORIDE 250 MG: 250 TABLET, FILM COATED ORAL at 19:39

## 2018-07-13 RX ADMIN — POTASSIUM CHLORIDE 60 MEQ: 1500 TABLET, EXTENDED RELEASE ORAL at 22:34

## 2018-07-13 RX ADMIN — ACETAMINOPHEN 975 MG: 325 TABLET, FILM COATED ORAL at 09:14

## 2018-07-13 RX ADMIN — HYDRALAZINE HYDROCHLORIDE 75 MG: 50 TABLET ORAL at 15:40

## 2018-07-13 ASSESSMENT — PAIN DESCRIPTION - DESCRIPTORS
DESCRIPTORS: ACHING
DESCRIPTORS: ACHING

## 2018-07-13 NOTE — PROVIDER NOTIFICATION
"Pt had 32 beats of SVT (HR 150s) at 10:38. Pt states she was dizzy and could \"feel my heart beating fast.\" Kb from CVTS notified. Metoprolol increased from 37.5 to 75 mg PO BID.   "

## 2018-07-13 NOTE — PLAN OF CARE
Problem: Patient Care Overview  Goal: Plan of Care/Patient Progress Review  RN:  1.Pt will remain hemodynamically stable.    2.Tropinin/EKG WNL.   Outcome: No Change  D/I/A: VSS on 1L oxygen via NC and CPAP at night. One episode of SVT shorter than previous, pt was dozing. Right leg blister burst, covered with dressing. Tylenol given at bedtime for general discomfort. Pt resting comfortably in chair or in bed between cares.       P: Continue to monitor and notify CVTS with concerns.

## 2018-07-13 NOTE — PLAN OF CARE
Problem: Patient Care Overview  Goal: Plan of Care/Patient Progress Review  RN:  1.Pt will remain hemodynamically stable.    2.Tropinin/EKG WNL.   OT/edema 6C: Pt's bruising appears to be improving, also LEs now with non pitting edema- still volume up. GCBs replaced from MTPs to knee creases daily 2/2 shape of LE and wraps sliding off. Please remove garment if it gets soiled or if pt c/o LE pain or numbness.

## 2018-07-13 NOTE — H&P
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 prophylaxis: Pantoprazole  13. Code: Full code  14. Disposition: Likely home with outpatient cardiopulmonary rehab services.  15. Estimated length of stay:  7-10 days.  16. Rehab prognosis:  Fair  17. Follow up appointments on discharge: 1) CT surgery 7/20 2) PCP within 2 weeks of d/c.    Rafi Crawford MD  PGY-2, PM&R    60 minutes spent with admission, more than 50% in direct patient interaction and education, and the remainder in coordination of care.        I, Judie Johnson, personally examined and evaluated this patient on 7/13/18. I discussed the patient with my resident Dr. Crawford and care team, and agree with the assessment and plan of care as documented in his note.    I personally reviewed the chart (vitals signs, medications, labs and imaging).     My key findings and key management decisions made by me: Ms Rbieiro is admitted to acute rehabilitation following a 3 vessel CABG for unstable angina. Active issues that impact her rehabilitation include cardiac deconditioning, obesity, lymphedema, s/p bilateral TKAs, and COPD. She has historically had DM that has been difficult to control. Low threshold to consult  endocrinology if BS sub-optimal.  Goal will be for her to discharge home with minimal assistance from roommate, Odalis.       I spent a total of 70 minutes face-to-face and managing the care of Mariel Ribeiro. Over 50% of my time on the unit was spent counseling the patient and coordinating care. See note for details.

## 2018-07-13 NOTE — DISCHARGE INSTRUCTIONS
Diabetes follow-up: Call [facility] coordinator: [phone], to schedule your outpatient diabetes appointment.  Recommend you be seen 3-4 weeks from discharge.      [facility]      AFTER YOU GO HOME FROM YOUR HEART SURGERY    You had a full sternotomy, so avoid lifting anything greater than ten pounds for 6 weeks after surgery and then less than 20 pounds for an additional 6 weeks.    No driving for 4 weeks after surgery or while on pain medication.    Avoid strenuous activities such as bowling, vacuuming, raking, shoveling, golf or tennis for 12 weeks after your surgery. It is okay to resume sex if you feel comfortable in doing so. You may have to try different positions with your partner.     Splint your chest incision by hugging a pillow or bringing your arms across your chest when coughing or sneezing. Avoid pushing off with your arms when getting up for the first month if you have had your sternum opened.    Shower or wash your incisions daily with soap and water (or as instructed), pat dry. Keep wound clean and dry, showers are okay after discharge, but don't let spray hit directly on incision. No baths or swimming for 1 month.  Clean wounds twice a day for 2 weeks with microklenz spray if available or just soap and water. Cover chest tube sites with gauze until they stop draining, then leave open. It is not abnormal for chest tube sites to drain yellowish/clear fluid for up to 2-3 weeks after surgery.   Watch for signs of infection: increased redness, tenderness, warmth or any drainage that appears infected (pus like) or is persistent.  Also a temperature > 100.5 F or chills. Call your surgeon or primary care provider's office immediately. Remove any skin glue left on incisions after 10-14 days. This will not affect your incision and can speed up healing.    Exercise is very important in your recovery. Please follow the guidelines set up for you in your cardiac  "rehab classes at the hospital. If outpatient cardiac rehab was ordered for you, we highly recommend you participate. If you have problems arranging your cardiac rehab, please call [phone].     Avoid sitting for prolonged periods of time, try to walk every hour during the day. If you have a leg incision, elevate your leg often when you are not walking.    Check your weight when you get home from the hospital and continue to check it daily through your recovery for at least a month. If you notice a weight gain of 2-3 pounds in a week, notify your primary care physician, cardiologist or surgeon.    Bowel activity may be slow after surgery. If necessary, you may take an over the counter laxative such as Milk of Magnesia or Miralax. You may have stool softeners prescribed (docusate sodium, Senokot). We recommend using stool softeners while using narcotics for pain (oxycodone/percocet, hydrocodone/vicodin).      Wean OFF of narcotics (oxycodone, dilaudid, hydrocodone) as soon as possible. You should continue taking acetaminophen as long as you have any surgical pain as the first choice for pain control.  Add narcotics as necessary for pain to be tolerable     DENTAL VISITS AFTER SURGERY  If you have had your heart valve repaired or replaced, we do not recommend having any dental work done for 6 months and you will need to take an antibiotic prior to dental visits from now on.  Please notify your dentist before any procedure for the proper treatment needed. The antibiotic is taken by mouth one hour prior to visit. This includes routine cleanings.  You may hear your mechanical valve \"clicking,\" this is normal and not a sign of something wrong.    DO NOT SMOKE.  IF YOU NEED HELP QUITTING, PLEASE TALK WITH YOUR CARDIOLOGIST OR PRIMARY DOCTOR.    If you are on a blood thinner, follow the instructions you were given in the hospital and DO NOT SKIP this medication. Try and take it the same time everyday. Your primary care " physician or coumadin clinic will manage the dosing.     REGARDING PRESCRIPTION REFILLS.  If you need a refill on your pain medication contact us to discuss your pain and a possible one time refill.   All other medications will be adjusted, discontinued and re-filled by your primary care physician and/or your cardiologist as they were prior to your surgery. We have given you enough for one to three month with possibly one refill.    POST-OPERATIVE CLINIC VISITS  You have a follow up visit with [facility] Surgery Advance Care Practitioners on July 20th, 2018 at 2 pm at the [facility].  You will then return to the care of your primary physician and your cardiologist.   It is important to call for an appointment to see your cardiologist in 4-6 weeks after surgery and your primary care physician in 2-4 weeks after surgery.   If there is a need to return to see CT Surgery please call our  at [phone].    SURGICAL QUESTIONS  Please call Cynthia Donahue with any surgical recovery and medication questions, her phone number is listed below.  She can assist you with your needs and contact other surgery care team members as indicated.    On weekends or after hours, please call [phone] and ask the  to   page the Cardiothoracic Surgery fellow on call.      Thank you,    Your Cardiothoracic Surgery Team  Cynthia Donahue RN Care Coordinator-  [phone]   Rafaela VILLASENOR

## 2018-07-13 NOTE — PLAN OF CARE
Problem: Patient Care Overview  Goal: Plan of Care/Patient Progress Review  RN:  1.Pt will remain hemodynamically stable.    2.Tropinin/EKG WNL.     D: Pt admitted with CP, found to have severe 3 vessel disease, now s/p CABG x3 7/2/18. Hx DM 2, CKD, HTN, BELEN, COPD, anxiety, depression and lymphedema.       I/A: Vital signs stable, afebrile, A&Ox4. SR overnight, with occasional short runs of afib. PRN tylenol and lidocaine patch given for lower back pain. Pt reported a headache overnight but declined intervention. Incisions and dressings CDI.  BG WDL overnight. Lymphedema wraps in place. Up with SBA to commode, voiding well. Incontinence brief on for urgency. Slept between cares on BiPAP/NC @ 1LPM.       P: D/C to ARU possibly on today pending bed availability. Continue to monitor and notify [facility] with pertinent changes.

## 2018-07-13 NOTE — PROGRESS NOTES
Diabetes Consult Daily  Progress Note          Assessment/Plan:                          Mariel Ribeiro is a 73 year old female Past mental history of diabetes,obesity, COPD, CAD with history of stent placement, CKD stage III, hyperlipidemia,fibromyalgia, anxiety, depression, lymphedema, S/P CABG on 07/ 02/18                         PLAN  -NPH 35 units am ( 0900)  -NPH 30 units PM ( 2000)  -aspart for meals and snacks: 1 unit per 5 grams of CHO   -aspart for correction  high intensity ac and hs  -monitor glucose ac, hs, and 0200   Will continue to follow          Outpatient diabetes follow up: Pt and SO would like to consider seeing endocrinologist- ph number for [facility] included in AVS.  Plan discussed with patient                     Plan for [facility] ARU on Friday, July 13     .           Interval History:   The last 24 hours progress and nursing notes reviewed.  Blood sugars are in good control, < 200 and no hypoglycemia  Appetite is good, denies nausea and or vomiting  Working with therapies       PTA: will need to transition from Novolog to Regular insulin on discharge from ARU  NPH 35/40  Regular 16 to 18 units breakfast and pre-lunch  Dinner 10-12 units    Recent Labs  Lab 07/13/18  0907 07/13/18  0507 07/13/18  0232 07/12/18  2150 07/12/18  1718 07/12/18  1150 07/12/18  0715 07/12/18  0612  07/11/18  0611  07/10/18  0603  07/09/18  0555  07/08/18  0447   GLC  --  130*  --   --   --   --   --  114*  --  142*  --  83  --  78  --  202*   *  --  123* 136* 177* 139* 121*  --   < >  --   < >  --   < >  --   < >  --    < > = values in this interval not displayed.            Review of Systems:   See interval hx          Medications:       Active Diet Order      Combination Diet Regular Diet Adult; 6972-9799 Calories: Moderate Consistent CHO (4-6 CHO units/meal)      Advance Diet as Tolerated     Physical Exam:  Gen: Alert, sitting in chair,NAD   HEENT:  mucous membranes are  "moist  Resp: non-labored, supplemental oxygen by nasal cannula  Ext: + lower extremity edema   Neuro:oriented x3, communicating clearly  /56  Pulse 89  Temp 98.1  F (36.7  C) (Oral)  Resp 18  Ht 1.676 m (5' 6\")  Wt 139.9 kg (308 lb 6.4 oz)  SpO2 97%  BMI 49.78 kg/m2           Data:     Lab Results   Component Value Date    A1C 9.3 07/01/2018    A1C 8.2 03/19/2018    A1C 7.3 08/09/2017    A1C 7.2 03/08/2017    A1C 7.3 12/23/2016              CBC RESULTS:   Recent Labs   Lab Test  07/13/18   0507   WBC  8.7   RBC  2.72*   HGB  7.5*   HCT  25.3*   MCV  93   MCH  27.6   MCHC  29.6*   RDW  16.6*   PLT  232     Recent Labs   Lab Test  07/13/18   0507  07/12/18   0612   NA  140  140   POTASSIUM  3.6  4.1   CHLORIDE  102  100   CO2  30  32   ANIONGAP  7  7   GLC  130*  114*   BUN  37*  35*   CR  1.41*  1.46*   ANTOINETTE  8.7  9.5     Liver Function Studies -   Recent Labs   Lab Test  07/01/18   0621   PROTTOTAL  6.1*   ALBUMIN  3.1*   BILITOTAL  1.0   ALKPHOS  100   AST  20   ALT  26     Lab Results   Component Value Date    INR 1.09 07/06/2018    INR 1.30 07/03/2018    INR 1.40 07/02/2018    INR 1.52 07/02/2018    INR 1.01 06/29/2018    INR 1.12 08/08/2017    INR 1.04 05/18/2017    INR 1.06 11/18/2016    INR 1.02 10/27/2016    INR 1.19 12/19/2015    INR 1.08 05/05/2015    INR 1.4 05/04/2015    INR 0.97 01/28/2014     .      Naila Rose -[phone]  Diabetes Management job code [id number]            "

## 2018-07-13 NOTE — PLAN OF CARE
Problem: Goal/Outcome  Goal: Goal Outcome Summary  New patient from 6c. Arrived on unit via wheelchair at 1410. Patient is here for rehab status post a cabg x 3. Past medical history of diabetes, hypertension, CAD, Hyperlipidemia, cancer. Continent of bowel. Can be incontinent of bladder per report. Regular diet with thin liquids, will swallow meds whole continue with poc.

## 2018-07-13 NOTE — PROGRESS NOTES
[facility] Daily Note  7/13/2018  Attending: Joao Mason, *    S:   Brief run of SVT overnight, shorter than previous.    States that pain is being controlled. Denies any hallucinations.   Only complaint is continued left shoulder pain. Only has been using heating packs. Denies any chest pain or pressure.   Breathing is ok. Working with IS.   Denies any popping/clicking in her breast bone.  Working with therapies.     Denies F/C/Sweats.  No CP, SOB, or calf pain.    Tolerating diet, + BM.  + Flatus.      O:   Vitals:    07/12/18 2355 07/13/18 0325 07/13/18 0415 07/13/18 0716   BP: 139/63   134/67   BP Location: Left arm   Left arm   Pulse:       Resp: 18 18 18   Temp: 97.8  F (36.6  C)   98.1  F (36.7  C)   TempSrc: Axillary   Oral   SpO2: 98%  98% 97%   Weight:  139.9 kg (308 lb 6.4 oz)     Height:         Vitals:    07/11/18 0200 07/12/18 0208 07/13/18 0325   Weight: 142.1 kg (313 lb 3.2 oz) 140.1 kg (308 lb 14.4 oz) 139.9 kg (308 lb 6.4 oz)       Intake/Output Summary (Last 24 hours) at 07/13/18 0736  Last data filed at 07/13/18 0300   Gross per 24 hour   Intake             1480 ml   Output             1375 ml   Net              105 ml       Gen: up in chair, comfortable, +O2 (1L NC)  CV: RRR, telemetry SR 70s, +edema LE (legs wrapped)  Pulm: decreased breath sounds in bases, -750 ml  Abd: BS+, soft  Incision: sternal incision D/I   Bilateral LE incision dry/intact, +staples    Labs:  BMP    Recent Labs  Lab 07/13/18  0507 07/12/18  0612 07/11/18 2010 07/11/18  0611 07/10/18  0603    140  --  141 142   POTASSIUM 3.6 4.1 3.7 4.0 4.4   CHLORIDE 102 100  --  104 107   ANTOINETTE 8.7 9.5  --  9.2 9.2   CO2 30 32  --  30 30   BUN 37* 35*  --  34* 33*   CR 1.41* 1.46*  --  1.31* 1.39*   * 114*  --  142* 83     CBC    Recent Labs  Lab 07/13/18  0507 07/12/18  0612 07/11/18  0611 07/10/18  0603   WBC 8.7 8.5 8.3 10.6   RBC 2.72* 2.78* 2.73* 2.75*   HGB 7.5* 7.7* 7.6* 7.5*   HCT 25.3* 26.2* 25.2* 25.6*    MCV 93 94 92 93   MCH 27.6 27.7 27.8 27.3   MCHC 29.6* 29.4* 30.2* 29.3*   RDW 16.6* 16.7* 16.4* 16.4*    226 205 196     Hepatic Panel   Lab Results   Component Value Date    AST 20 07/01/2018     Lab Results   Component Value Date    ALT 26 07/01/2018     Lab Results   Component Value Date    ALBUMIN 3.1 07/01/2018     GLUCOSE:     Recent Labs  Lab 07/13/18  0507 07/13/18  0232 07/12/18  2150 07/12/18  1718 07/12/18  1150 07/12/18  0715 07/12/18  0612 07/12/18  0207  07/11/18  0611  07/10/18  0603  07/09/18  0555  07/08/18  0447   *  --   --   --   --   --  114*  --   --  142*  --  83  --  78  --  202*   BGM  --  123* 136* 177* 139* 121*  --  118*  < >  --   < >  --   < >  --   < >  --    < > = values in this interval not displayed.      Imaging:  reviewed recent imaging      A/P:   Mariel Ribeiro is a 73 year old female with a history of DM2, HTN, CKD3, HLD, COPD, fibromyalgia, anxiety, depression, lymphedema, and CAD now s/p CABG x 3 on 7/2/2018 with Dr. Mason.    Neuro:  - Neuro status intact  - Acute post-op pain: tylenol.   - PTA lexapro and pregablin    Musc:  - Shoulder pain - hx of shoulder dislocations, shoulder XR without fracture or dislocation, will try alternating heat and ice.     CV:  -  mg, atorvastatin 40 mg daily, metoprolol 75 mg BID, hydralazine 75 mg TID  - 31 beat run of SVT 7/12, increased BB, occurring less frequently now.  - will need to be on ace inh/arb due to hx of DM, hold for now given slight rise in creatinine, can start as outpatient  - Bilateral groin harvest sites with staples, clean, dry, and intact.  Staples x 2 weeks, remove 7/16.       Resp:  - Extubated POD 2  - IS, encourage activity  - CPAP overnight 10/5 RR 12.  Hx COPD and needing albuterol MDI 2-4 times per day at home for wheezing/SOB, started Breo Ellipta 7/10 for long term symptom control.   - Bilateral lower pleural effusions, aggressive pulmonary toilet   - nebs qid, acapella  valve      FEN/GI:   - Continue reg and mod carb DM diet, + BM     Renal:   - CKD 3  - Creatinine 1.46->1.41, at baseline 1.04, adequate UOP  - Volume status: hypervolemic, dry weight ~ 302 lbs, current weight 313 lbs.  - 7/9 Bumex 3 mg IV BID, good response, switched to PO bumex 3 mg PO BID 7/11, good UOP with bumex 2 mg PO BID, weight trending down slightly, continue (PTA diuretic was Lasix 80 mg BID)  - Lymphedema wraps      ID:   - Completed ralph-op abx  - WBC WNL, afebrile  - UTI - UA 7/11 obtained for dysuria, showed large LE and 63 WBC, e.coli and enterobacter on culture, sensative to cipro, continue for 7 day course.     Heme:   - Hgb stable mid 7's.  No signs or symptoms of bleeding      Endo:   - DM2  - 7/6 endocrine consult, 7/8 increased glargine, aspart for meals and snacks, and aspart correction increased to very high intensity ac and hs  - PTA levothyroxine    PPx:   - PPI, 5000 units SQ heparin      Anticoagulation:  - HIT negative, Plts WNL and trending up. No signs or symptoms of bleeding      Dispo:   - 6C since 7/5  - Therapy recommending d/c to ARU, medically ready for discharge, waiting on bed availability.       Encouraged IS/TCDB/amb. Sternal precautions. Ok to shower. Continue to monitor    Discussed with [facility] Fellow   Staff surgeons have been informed of changes through both  verbal and written communication.        Kb Jeffries PA-C  Cardiothoracic Surgery  July 13, 2018 7:36 AM   p: [phone]

## 2018-07-13 NOTE — IP AVS SNAPSHOT
MRN:[id number]                      After Visit Summary   7/13/2018    Mariel Ribeiro    MRN: [id number]           Thank you!     Thank you for choosing [facility] for your care. Our goal is always to provide you with excellent care. Hearing back from our patients is one way we can continue to improve our services. Please take a few minutes to complete the written survey that you may receive in the mail after you visit with us. Thank you!        Patient Information     Date Of Birth          1945        About your hospital stay     You were admitted on:  July 13, 2018 You last received care in the:   [facility]    You were discharged on:  July 23, 2018        Reason for your hospital stay       Admitted to acute rehab after your heart surgery. A little prolongation of your discharge related to medical issues but now ready to get home.                  Who to Call     For medical emergencies, please call 911.  For non-urgent questions about your medical care, please call your primary care provider or clinic, [phone]          Attending Provider     Provider Specialty    Standal, Judie Romano MD Physical Medicine and Rehab    Meet Pan MD Physical Medicine and Rehab       Primary Care Provider Office Phone # Fax #    Rafaela William [phone]      After Care Instructions     Activity       Your activity upon discharge: continue sternal precautions. Use walker or cane for support with walking. No driving until advanced by cardiology.            Diet       Follow this diet upon discharge: diabetic diet 3414-0897 Calories and Consistent carbs (4-7 CHO units/meal). Low salt            Monitor and record       blood glucose 4 times a day, before meals and at bedtime  blood pressure and heart rate daily  weight every day                  Follow-up Appointments     Follow Up and recommended labs and tests       1) CVTS surgery appointment rescheduled for  Tuesday, 7/24 at 1:30 PM.     2) PCP within 2 weeks of discharge. Appointment scheduled for 7/27 at 1:40PM with Dr. William.    3) Endocrine clinic as scheduled July 30th, 12:30.     4) Dr. Stephanie Wolf for heart failure clinic scheduled 8/15/2018.    5) Outpatient sports PM&R with Sean Richard for left shoulder pain and possible GH joint injection.                  Your next 10 appointments already scheduled     Jul 24, 2018  1:30 PM CDT   (Arrive by 1:15 PM)   Return Visit with  CVTS   [facility] ([facility])    [address] [place] [address]  [address] [place] [id number] [phone]            Jul 27, 2018  1:40 PM CDT   Office Visit with Rafaela William MD   [facility] ([facility])    [address] [place] [id number] [phone]           Bring a current list of meds and any records pertaining to this visit. For Physicals, please bring immunization records and any forms needing to be filled out. Please arrive 10 minutes early to complete paperwork.            Jul 30, 2018 12:30 PM CDT   (Arrive by 12:15 PM)   RETURN DIABETES with Keven Jaeger MD   [facility] ([facility])    [address] [place] [address]  [address]rd [facility] [id number] [phone]            Aug 15, 2018  8:30 AM CDT   Lab with  LAB   [facility] ([facility])    [address] [place] [address]  [address] [facility] [id number] [phone]            Aug 15, 2018  9:00 AM CDT   (Arrive by 8:45 AM)   RETURN HEART FAILURE with Stephanie Wolf MD   [facility] ([facility])    [address] [place] [address]  [address] [place] [id number] [phone]              Additional Services     ENDOCRINOLOGY ADULT REFERRAL       For difficult to ctrl DM. On Prandin TID, NPH in split doses.            Home Care PT Referral for Hospital Discharge       PT to  eval and treat    Your provider has ordered home care - physical therapy. If you have not been contacted within 2 days of your discharge please call the department phone number listed on the top of this document.            Home care nursing referral       RN skilled nursing visit. RN to assess vital signs and weight and respiratory and cardiac status.    Your provider has ordered home care nursing services. If you have not been contacted within 2 days of your discharge please call the inpatient department phone number at [phone] .                  Further instructions from your care team       1) CVTS surgery appointment rescheduled for Tuesday, 7/24 at 1:30 PM. CORE clinic if unable to make the CVTS appointment.    2) PCP within 2 weeks of discharge. Appointment scheduled for 7/27 at 1:40PM with Dr. William.    3) Endocrine clinic as scheduled July 30th, 12:30.     4) Dr. Stephanie Wolf for heart failure clinic scheduled 8/15/2018.    5) Outpatient sports PM&R with Sean Ramos for left shoulder pain and possible GH joint injection.As needed or HUC to schedule?    DIABETES  Plan for home:  -NPH 35 units at 8am and 35 units with supper  -repaglinide 1mg with each meal  -do NOT take regular insulin (Novolin R)  -check blood sugars before meals and bedtime (target glucose is <140 before meals, <200 at bedtime)  -follow up with your primary care provider and pharmacist within 2-3 weeks of discharge    Diabetes follow-up: [facility] coordinator: [phone], to schedule your outpatient diabetes appointment (if you decide you want to see an endocrinologist for diabetes management).          Pending Results     No orders found from 7/11/2018 to 7/14/2018.            Statement of Approval     Ordered          07/23/18 0735  I have reviewed and agree with all the recommendations and orders detailed in this document.  EFFECTIVE NOW     Approved and electronically signed by:  Meet Pan MD      "        Admission Information     Date & Time Provider Department Dept. Phone    7/13/2018 Meet Pan MD  ACUTE [facility]HAB [phone]      Your Vitals Were     Blood Pressure Pulse Temperature Respirations Height Weight    125/53 66 96.5  F (35.8  C) (Oral) 16 1.676 m (5' 6\") 137.4 kg (302 lb 14.4 oz)    Pulse Oximetry BMI (Body Mass Index)                98% 48.89 kg/m2          MyChart Information     [facility] gives you secure access to your electronic health record. If you see a primary care provider, you can also send messages to your care team and make appointments. If you have questions, please call your primary care clinic.  If you do not have a primary care provider, please call [phone] and they will assist you.        Care EveryWhere ID     This is your Care EveryWhere ID. This could be used by other organizations to access your [facility] medical records  [id number]        Equal Access to Services     KJ SALVADOR AH: Hadii von Martin, wajamesda kenny, qavic kaalmajanett taylor, gopal pena . So [facility] [phone].    ATENCIÓN: Si habla español, tiene a gillespie disposición servicios gratuitos de asistencia lingüística. Llame al [phone].    We comply with applicable federal civil rights laws and [place] laws. We do not discriminate on the basis of race, color, national origin, age, disability, sex, sexual orientation, or gender identity.               Review of your medicines      START taking        Dose / Directions    Cholecalciferol 71752 units Caps   Indication:  Liquid gel caps   Replaces:  vitamin D3 5000 UNIT/ML Liqd        Dose:  87185 Units   Take 10,000 Units by mouth daily   Quantity:  30 capsule   Refills:  0       ketamine (KETALAR) 5%-gabapentin (NEURONTIN) 8% 5%-8% Gel topical PLO cream   Used for:  Pain in joint of left shoulder        Dose:  30 g   Apply 30 g topically 3 times daily   Quantity:  30 g   Refills:  0       niacin 500 MG CR " tablet   Commonly known as:  SLO-NIACIN   Used for:  Atherosclerosis of native coronary artery without angina pectoris, unspecified whether native or transplanted heart   Replaces:  niacin 500 MG CR tablet        Dose:  500 mg   Take 1 tablet (500 mg) by mouth 2 times daily   Quantity:  60 tablet   Refills:  0       repaglinide 1 MG tablet   Commonly known as:  PRANDIN        Dose:  1 mg   Take 1 tablet (1 mg) by mouth 3 times daily (before meals)   Quantity:  90 tablet   Refills:  0       traZODone 150 MG tablet   Commonly known as:  DESYREL   Used for:  Recurrent major depressive disorder, in partial remission (H)        Dose:  150 mg   Take 1 tablet (150 mg) by mouth At Bedtime   Quantity:  60 tablet   Refills:  0         CONTINUE these medicines which may have CHANGED, or have new prescriptions. If we are uncertain of the size of tablets/capsules you have at home, strength may be listed as something that might have changed.        Dose / Directions    albuterol 108 (90 Base) MCG/ACT Inhaler   Commonly known as:  PROAIR HFA   This may have changed:    - how much to take  - how to take this  - when to take this  - reasons to take this  - additional instructions  - Another medication with the same name was removed. Continue taking this medication, and follow the directions you see here.   Used for:  Chronic obstructive pulmonary disease, unspecified COPD type (H)        Dose:  1-2 puff   Inhale 1-2 puffs into the lungs every 4 hours as needed for wheezing   Quantity:  1 Inhaler   Refills:  0       atorvastatin 40 MG tablet   Commonly known as:  LIPITOR   This may have changed:  See the new instructions.   Used for:  Atherosclerosis of native coronary artery without angina pectoris, unspecified whether native or transplanted heart        Dose:  40 mg   Take 1 tablet (40 mg) by mouth daily   Quantity:  30 tablet   Refills:  0       febuxostat 40 MG Tabs tablet   Commonly known as:  ULORIC   This may have changed:   See the new instructions.   Used for:  Vitamin D deficiency        Dose:  40 mg   Take 1 tablet (40 mg) by mouth every evening   Quantity:  30 tablet   Refills:  0       folic acid 800 MCG Tabs   This may have changed:    - how much to take  - additional instructions   Used for:  Atherosclerosis of native coronary artery without angina pectoris, unspecified whether native or transplanted heart        Dose:  800 mcg   Take 800 mcg by mouth daily   Quantity:  30 tablet   Refills:  0       guaiFENesin 600 MG 12 hr tablet   Commonly known as:  MUCINEX   This may have changed:    - medication strength  - See the new instructions.        Dose:  600 mg   Take 1 tablet (600 mg) by mouth 2 times daily   Refills:  0       * insulin isophane human 100 UNIT/ML injection   Commonly known as:  HumuLIN N PEN   This may have changed:    - how much to take  - when to take this  - additional instructions        Dose:  35 Units   Inject 35 Units Subcutaneous daily (with dinner)   Quantity:  10.5 mL   Refills:  0       * insulin isophane human 100 UNIT/ML injection   Commonly known as:  HumuLIN N PEN   This may have changed:  additional instructions        Dose:  35 Units   Inject 35 Units Subcutaneous every 24 hours   Quantity:  10.5 mL   Refills:  0       nitroGLYcerin 0.4 MG sublingual tablet   Commonly known as:  NITROSTAT   This may have changed:    - how much to take  - how to take this  - when to take this  - reasons to take this  - additional instructions   Used for:  Atherosclerosis of native coronary artery without angina pectoris, unspecified whether native or transplanted heart        For chest pain place 1 tablet under the tongue every 5 minutes for 3 doses. If symptoms persist 5 minutes after 1st dose call 911.   Quantity:  25 tablet   Refills:  0       potassium chloride SA 20 MEQ CR tablet   Commonly known as:  K-DUR/KLOR-CON M   This may have changed:    - how much to take  - when to take this   Used for:   Atherosclerosis of native coronary artery without angina pectoris, unspecified whether native or transplanted heart        Dose:  40 mEq   Take 2 tablets (40 mEq) by mouth daily   Quantity:  60 tablet   Refills:  0       pregabalin 100 MG capsule   Commonly known as:  LYRICA   This may have changed:  See the new instructions.   Used for:  Pain in joint of left shoulder        Dose:  100 mg   Take 1 capsule (100 mg) by mouth 2 times daily   Quantity:  90 capsule   Refills:  0       * Notice:  This list has 2 medication(s) that are the same as other medications prescribed for you. Read the directions carefully, and ask your doctor or other care provider to review them with you.      CONTINUE these medicines which have NOT CHANGED        Dose / Directions    acetaminophen 325 MG tablet   Commonly known as:  TYLENOL   Used for:  S/P CABG x 3, Acute post-operative pain        Dose:  650 mg   Take 2 tablets (650 mg) by mouth every 4 hours as needed for mild pain   Quantity:  100 tablet   Refills:  0       aspirin 325 MG EC tablet        Dose:  325 mg   Take 1 tablet by mouth daily.   Quantity:  30 tablet   Refills:  0       B-D U/F 31G X 5 MM   Used for:  Type 2 diabetes mellitus with diabetic polyneuropathy, without long-term current use of insulin (H)   Generic drug:  insulin pen needle        USE FOR INSULIN INJECTION   Quantity:  100 each   Refills:  0       blood glucose monitoring test strip   Commonly known as:  no brand specified   Used for:  Type 2 diabetes mellitus with diabetic polyneuropathy, without long-term current use of insulin (H)        Dose:  1 strip   1 strip by In Vitro route 2 times daily Strips per patient's preference   Quantity:  200 each   Refills:  3       bumetanide 2 MG tablet   Commonly known as:  BUMEX   Used for:  Atherosclerosis of native coronary artery without angina pectoris, unspecified whether native or transplanted heart        Dose:  2 mg   Take 1 tablet (2 mg) by mouth 2 times  daily   Quantity:  30 tablet   Refills:  0       EPINEPHrine 0.3 MG/0.3ML injection 2-pack   Commonly known as:  EPIPEN/ADRENACLICK/or ANY BX GENERIC EQUIV   Used for:  Allergic reaction, subsequent encounter        Dose:  0.3 mg   Inject 0.3 mLs (0.3 mg) into the muscle once as needed for anaphylaxis   Quantity:  0.6 mL   Refills:  0       escitalopram 20 MG tablet   Commonly known as:  LEXAPRO   Used for:  Recurrent major depressive disorder, in partial remission (H)        Dose:  20 mg   Take 1 tablet (20 mg) by mouth every morning   Quantity:  30 tablet   Refills:  0       hydrALAZINE 25 MG tablet   Commonly known as:  APRESOLINE   Used for:  Atherosclerosis of native coronary artery without angina pectoris, unspecified whether native or transplanted heart        Dose:  75 mg   Take 3 tablets (75 mg) by mouth 3 times daily   Quantity:  270 tablet   Refills:  0       insulin syringes (disposable) U-100 1 ML Misc        Dose:  1 each   1 each 5 times daily   Quantity:  100 each   Refills:  11       levothyroxine 150 MCG tablet   Commonly known as:  SYNTHROID/LEVOTHROID   Used for:  Other specified hypothyroidism        Dose:  150 mcg   Take 1 tablet (150 mcg) by mouth daily   Quantity:  30 tablet   Refills:  0       BNI Video FINEPOINT LANCETS Misc   Used for:  Type 2 diabetes mellitus with diabetic polyneuropathy, without long-term current use of insulin (H)        Use to test blood sugars 2 times daily or as directed.   Quantity:  100 each   Refills:  prn       Metoprolol Tartrate 75 MG Tabs   Used for:  Atherosclerosis of native coronary artery without angina pectoris, unspecified whether native or transplanted heart        Dose:  75 mg   Take 75 mg by mouth 2 times daily   Quantity:  60 tablet   Refills:  0       ONE TOUCH ULTRA (DEVICES) Misc   Used for:  Type II or unspecified type diabetes mellitus without mention of complication, not stated as uncontrolled        test blood sugar BID   Quantity:  1    Refills:  0       polyethylene glycol Packet   Commonly known as:  MIRALAX/GLYCOLAX   Used for:  Atherosclerosis of native coronary artery without angina pectoris, unspecified whether native or transplanted heart        Dose:  17 g   Take 17 g by mouth daily   Quantity:  7 packet   Refills:  0       senna-docusate 8.6-50 MG per tablet   Commonly known as:  SENOKOT-S;PERICOLACE   Used for:  Atherosclerosis of native coronary artery without angina pectoris, unspecified whether native or transplanted heart        Dose:  1-2 tablet   Take 1-2 tablets by mouth 2 times daily as needed for constipation   Quantity:  30 tablet   Refills:  0         STOP taking     artificial saliva Soln solution           ciprofloxacin 250 MG tablet   Commonly known as:  CIPRO           cyclobenzaprine 10 MG tablet   Commonly known as:  FLEXERIL           cycloSPORINE 0.05 % ophthalmic emulsion   Commonly known as:  RESTASIS           fluticasone-vilanterol 100-25 MCG/INH oral inhaler   Commonly known as:  BREO ELLIPTA           HYDROmorphone 2 MG tablet   Commonly known as:  DILAUDID           insulin aspart 100 UNIT/ML injection   Commonly known as:  NovoLOG PEN           melatonin 3 MG tablet           niacin 500 MG CR tablet   Commonly known as:  NIASPAN   Replaced by:  niacin 500 MG CR tablet           pantoprazole 40 MG EC tablet   Commonly known as:  PROTONIX           prochlorperazine 5 MG tablet   Commonly known as:  COMPAZINE           vitamin D3 5000 UNIT/ML Liqd   Replaced by:  Cholecalciferol 06343 units Caps                Where to get your medicines      These medications were sent to [facility] [place], [place] - [address]  [address] [address]     Phone:  [phone]     albuterol 108 (90 Base) MCG/ACT Inhaler    atorvastatin 40 MG tablet    bumetanide 2 MG tablet    Cholecalciferol 60044 units Caps    escitalopram 20 MG tablet    febuxostat 40 MG Tabs tablet    folic acid  800 MCG Tabs    hydrALAZINE 25 MG tablet    insulin isophane human 100 UNIT/ML injection    insulin isophane human 100 UNIT/ML injection    levothyroxine 150 MCG tablet    Metoprolol Tartrate 75 MG Tabs    niacin 500 MG CR tablet    nitroGLYcerin 0.4 MG sublingual tablet    polyethylene glycol Packet    potassium chloride SA 20 MEQ CR tablet    repaglinide 1 MG tablet    senna-docusate 8.6-50 MG per tablet    traZODone 150 MG tablet         Some of these will need a paper prescription and others can be bought over the counter. Ask your nurse if you have questions.     Bring a paper prescription for each of these medications     ketamine (KETALAR) 5%-gabapentin (NEURONTIN) 8% 5%-8% Gel topical PLO cream    pregabalin 100 MG capsule                Protect others around you: Learn how to safely use, store and throw away your medicines at www.disposemymeds.org.             Medication List: This is a list of all your medications and when to take them. Check marks below indicate your daily home schedule. Keep this list as a reference.      Medications           Morning Afternoon Evening Bedtime As Needed    acetaminophen 325 MG tablet   Commonly known as:  TYLENOL   Take 2 tablets (650 mg) by mouth every 4 hours as needed for mild pain   Last time this was given:  975 mg on 7/23/2018  6:34 AM                                albuterol 108 (90 Base) MCG/ACT Inhaler   Commonly known as:  PROAIR HFA   Inhale 1-2 puffs into the lungs every 4 hours as needed for wheezing                                aspirin 325 MG EC tablet   Take 1 tablet by mouth daily.   Last time this was given:  325 mg on 7/23/2018  7:51 AM                                atorvastatin 40 MG tablet   Commonly known as:  LIPITOR   Take 1 tablet (40 mg) by mouth daily   Last time this was given:  40 mg on 7/22/2018  9:55 PM                                B-D U/F 31G X 5 MM   USE FOR INSULIN INJECTION   Generic drug:  insulin pen needle                                 blood glucose monitoring test strip   Commonly known as:  no brand specified   1 strip by In Vitro route 2 times daily Strips per patient's preference                                bumetanide 2 MG tablet   Commonly known as:  BUMEX   Take 1 tablet (2 mg) by mouth 2 times daily   Last time this was given:  2 mg on 7/23/2018  7:52 AM                                Cholecalciferol 49631 units Caps   Take 10,000 Units by mouth daily   Last time this was given:  10,000 Units on 7/23/2018  7:50 AM                                EPINEPHrine 0.3 MG/0.3ML injection 2-pack   Commonly known as:  EPIPEN/ADRENACLICK/or ANY BX GENERIC EQUIV   Inject 0.3 mLs (0.3 mg) into the muscle once as needed for anaphylaxis                                escitalopram 20 MG tablet   Commonly known as:  LEXAPRO   Take 1 tablet (20 mg) by mouth every morning   Last time this was given:  20 mg on 7/23/2018  7:52 AM                                febuxostat 40 MG Tabs tablet   Commonly known as:  ULORIC   Take 1 tablet (40 mg) by mouth every evening   Last time this was given:  40 mg on 7/22/2018  9:55 PM                                folic acid 800 MCG Tabs   Take 800 mcg by mouth daily   Last time this was given:  800 mcg on 7/23/2018  7:52 AM                                guaiFENesin 600 MG 12 hr tablet   Commonly known as:  MUCINEX   Take 1 tablet (600 mg) by mouth 2 times daily   Last time this was given:  600 mg on 7/23/2018  7:52 AM                                hydrALAZINE 25 MG tablet   Commonly known as:  APRESOLINE   Take 3 tablets (75 mg) by mouth 3 times daily   Last time this was given:  75 mg on 7/23/2018  7:52 AM                                * insulin isophane human 100 UNIT/ML injection   Commonly known as:  HumuLIN N PEN   Inject 35 Units Subcutaneous daily (with dinner)   Last time this was given:  35 Units on 7/23/2018  8:04 AM                                * insulin isophane human 100 UNIT/ML injection    Commonly known as:  HumuLIN N PEN   Inject 35 Units Subcutaneous every 24 hours   Last time this was given:  35 Units on 7/23/2018  8:04 AM                                insulin syringes (disposable) U-100 1 ML Misc   1 each 5 times daily                                ketamine (KETALAR) 5%-gabapentin (NEURONTIN) 8% 5%-8% Gel topical PLO cream   Apply 30 g topically 3 times daily   Last time this was given:  7/23/2018  8:08 AM                                levothyroxine 150 MCG tablet   Commonly known as:  SYNTHROID/LEVOTHROID   Take 1 tablet (150 mcg) by mouth daily   Last time this was given:  150 mcg on 7/23/2018  7:51 AM                                [employer] FINEPOINT LANCETS Misc   Use to test blood sugars 2 times daily or as directed.                                Metoprolol Tartrate 75 MG Tabs   Take 75 mg by mouth 2 times daily   Last time this was given:  75 mg on 7/23/2018  7:51 AM                                niacin 500 MG CR tablet   Commonly known as:  SLO-NIACIN   Take 1 tablet (500 mg) by mouth 2 times daily   Last time this was given:  500 mg on 7/23/2018  7:50 AM                                nitroGLYcerin 0.4 MG sublingual tablet   Commonly known as:  NITROSTAT   For chest pain place 1 tablet under the tongue every 5 minutes for 3 doses. If symptoms persist 5 minutes after 1st dose call 911.                                ONE TOUCH ULTRA (DEVICES) Misc   test blood sugar BID                                polyethylene glycol Packet   Commonly known as:  MIRALAX/GLYCOLAX   Take 17 g by mouth daily   Last time this was given:  17 g on 7/22/2018  8:32 AM                                potassium chloride SA 20 MEQ CR tablet   Commonly known as:  K-DUR/KLOR-CON M   Take 2 tablets (40 mEq) by mouth daily   Last time this was given:  40 mEq on 7/23/2018  7:52 AM                                pregabalin 100 MG capsule   Commonly known as:  LYRICA   Take 1 capsule (100 mg) by mouth 2 times daily    Last time this was given:  100 mg on 7/23/2018  7:52 AM                                repaglinide 1 MG tablet   Commonly known as:  PRANDIN   Take 1 tablet (1 mg) by mouth 3 times daily (before meals)   Last time this was given:  1 mg on 7/23/2018  7:50 AM                                senna-docusate 8.6-50 MG per tablet   Commonly known as:  SENOKOT-S;PERICOLACE   Take 1-2 tablets by mouth 2 times daily as needed for constipation                                traZODone 150 MG tablet   Commonly known as:  DESYREL   Take 1 tablet (150 mg) by mouth At Bedtime   Last time this was given:  150 mg on 7/22/2018  9:56 PM                                * Notice:  This list has 2 medication(s) that are the same as other medications prescribed for you. Read the directions carefully, and ask your doctor or other care provider to review them with you.

## 2018-07-13 NOTE — IP AVS SNAPSHOT
[facility]    [address] [facility] [address]    Phone:  [phone]                                       After Visit Summary   7/13/2018    Mariel Ribeiro    MRN: [id number]           After Visit Summary Signature Page     I have received my discharge instructions, and my questions have been answered. I have discussed any challenges I see with this plan with the nurse or doctor.    ..........................................................................................................................................  Patient/Patient Representative Signature      ..........................................................................................................................................  Patient Representative Print Name and Relationship to Patient    ..................................................               ................................................  Date                                            Time    ..........................................................................................................................................  Reviewed by Signature/Title    ...................................................              ..............................................  Date                                                            Time

## 2018-07-13 NOTE — PLAN OF CARE
Problem: Patient Care Overview  Goal: Plan of Care/Patient Progress Review  RN:  1.Pt will remain hemodynamically stable.    2.Tropinin/EKG WNL.   Physical Therapy Discharge Summary    Reason for therapy discharge:    Discharged to acute rehabilitation facility.    Progress towards therapy goal(s). See goals on Care Plan in [facility] electronic health record for goal details.  Goals partially met.  Barriers to achieving goals:   discharge from facility.    Therapy recommendation(s):    Continued therapy is recommended.  Rationale/Recommendations:  ARU to progress safety and indep with functional mobility.

## 2018-07-13 NOTE — PLAN OF CARE
Problem: Patient Care Overview  Goal: Plan of Care/Patient Progress Review  RN:  1.Pt will remain hemodynamically stable.    2.Tropinin/EKG WNL.    Occupational Therapy Discharge Summary    Reason for therapy discharge:    Discharged to acute rehabilitation facility.    Progress towards therapy goal(s). See goals on Care Plan in [facility] electronic health record for goal details.  Goals partially met.  Barriers to achieving goals:   discharge from facility.    Therapy recommendation(s):    Continued therapy is recommended.  Rationale/Recommendations:  ARU to progress pt strength and endurance and facilitate return to PLOF .

## 2018-07-13 NOTE — PROGRESS NOTES
Pt K+ this AM 3.6; scheduled 60 mEQ given. Per provider, ok to not give replacement.   Pt magnesium this AM 1.8, per provider ok to not give pt replacement. Provider will prescribe mag oxide on discharge.   Will continue to monitor and assess pt with every encounter.

## 2018-07-13 NOTE — PLAN OF CARE
Problem: Patient Care Overview  Goal: Plan of Care/Patient Progress Review  RN:  1.Pt will remain hemodynamically stable.    2.Tropinin/EKG WNL.   Discharge Planner SLP   Patient plan for discharge: ARU  Current status: Pt doing quite well. Recommend continue regular consistency diet with thin liquids. Sit pt upright for po intake. SLP to sing off.   Barriers to return to prior living situation: None per SLP  Recommendations for discharge: Defer to PT/OT  Rationale for recommendations: No further SLP services indicated.       Entered by: Opal Carvajal 07/13/2018 10:26 AM       Speech Language Therapy Discharge Summary    Reason for therapy discharge:    Pt had met goals.     Progress towards therapy goal(s). See goals on Care Plan in [facility] electronic health record for goal details.  Goals met    Therapy recommendation(s):    No further therapy is recommended.     Regular solids and thin liquids.

## 2018-07-13 NOTE — PROGRESS NOTES
Rehab Admissions:    Met w/ patient this morning to discuss [facility]. Educated pt on setup/structure of ARC w/ pt receiving 3 hours of PT/OT/edema daily w/ goal to regain her functional IND in order to disch to her home after estimated 7-10 day LOS. Discussed parking options for her roommate Odalis. Pt inquiring about dressing (wear clothes from home), use of restroom (bariatric commode to be ordered to bedside d/t urgency). Pt will also need to be provided CPAP while on ARC, and she needs sizewise recliner. Answered pt's questions. Pt pleasant and engaged throughout our conversation.     Thank you for the referral, we will continue to follow this patient for post acute placement.     Determination of admission is based upon the patient's need for an intensive, interdisciplinary approach to rehabilitation, their ability to progress, their ability to tolerate intensive therapies, their need for daily physician supervision, their need for twenty four hour nursing assistance, and their ability and willingness to participate in such a program.    Shauna Shankar CM  Rehab Liaison/  [facility]  7/13/2018    12:27 PM

## 2018-07-13 NOTE — PLAN OF CARE
Problem: Patient Care Overview  Goal: Plan of Care/Patient Progress Review  RN:  1.Pt will remain hemodynamically stable.    2.Tropinin/EKG WNL.   Outcome: Adequate for Discharge Date Met: 07/13/18  DISCHARGE/TRANSFER  Discharged/transferred to: [facility]  Via: [employer] automobile  Accompanied by: emergency transport staff  Discharge Instructions: diet, activity, medications, follow up appointments, when to call the MD, and what to watchout for (i.e. s/s of infection, increasing SOB, palpitations, chest pain,)  Prescriptions: To be filled when appropriate  Follow Up Appointments: arranged; information given  Belongings: Some sent with pt; the rest will be picked up by pt roommate per pt request  IV: PICC still in per pt provider and ARU  Telemetry: off  Pt exhibits understanding of above discharge instructions; all questions answered.  Discharge Paperwork: faxed    Report given to: JODY Sutherland ARU

## 2018-07-13 NOTE — H&P
Post Admission Physician Evaluation:    I have compared Mariel Ribeiro's condition on admission to acute rehabilitation to that outlined in the preadmission screen. History and physical exam performed by me. No significant differences are identified and the patient remains appropriate for an inpatient rehabilitation facility level of care to manage medical issues and address functional impairments due to cardiac deconditioning.    Comorbid medical conditions being managed: DM 2, HTN, CKD 3, HLD, COPD, fibromyalgia, anxiety, depression, lymphedema, thyroid cancer s/p resection    Prior functional level: Patient was previously independent in her home with a 4 wheeled walker.     Present function: Pt fatigued today and with pain in R shoulder not wanting to ambulate OOR.  Pt completed several bouts of marching in place and forward stepping, needing to take seated rest breaks between bouts due to fatigue. Pt completed seated ADLs with set-up and min A. Pt completed LE ergometer 2 sets x2 min.     Anticipated rehabilitation course: Anticipate that she will progress to modified independent with mobility and ADLs with 4WW for home discharge with support from friend, Odalis.     Will benefit from intensive rehabilitation includin minutes each of PT and OT  Rehabilitation nursing  Close management by physiatry    Prognosis: Good    Estimated length of stay: 7-10 days      Judie Johnson MD

## 2018-07-14 LAB
ANION GAP SERPL CALCULATED.3IONS-SCNC: 8 MMOL/L (ref 3–14)
BASOPHILS # BLD AUTO: 0 10E9/L (ref 0–0.2)
BASOPHILS NFR BLD AUTO: 0.2 %
BUN SERPL-MCNC: 36 MG/DL (ref 7–30)
CALCIUM SERPL-MCNC: 9 MG/DL (ref 8.5–10.1)
CHLORIDE SERPL-SCNC: 103 MMOL/L (ref 94–109)
CO2 SERPL-SCNC: 31 MMOL/L (ref 20–32)
CREAT SERPL-MCNC: 1.42 MG/DL (ref 0.52–1.04)
DIFFERENTIAL METHOD BLD: ABNORMAL
EOSINOPHIL # BLD AUTO: 0.3 10E9/L (ref 0–0.7)
EOSINOPHIL NFR BLD AUTO: 3.2 %
ERYTHROCYTE [DISTWIDTH] IN BLOOD BY AUTOMATED COUNT: 16.8 % (ref 10–15)
FERRITIN SERPL-MCNC: 58 NG/ML (ref 8–252)
GFR SERPL CREATININE-BSD FRML MDRD: 36 ML/MIN/1.7M2
GLUCOSE BLDC GLUCOMTR-MCNC: 153 MG/DL (ref 70–99)
GLUCOSE BLDC GLUCOMTR-MCNC: 169 MG/DL (ref 70–99)
GLUCOSE BLDC GLUCOMTR-MCNC: 181 MG/DL (ref 70–99)
GLUCOSE SERPL-MCNC: 156 MG/DL (ref 70–99)
HCT VFR BLD AUTO: 27.4 % (ref 35–47)
HGB BLD-MCNC: 8 G/DL (ref 11.7–15.7)
IMM GRANULOCYTES # BLD: 0 10E9/L (ref 0–0.4)
IMM GRANULOCYTES NFR BLD: 0.5 %
IRON SATN MFR SERPL: 7 % (ref 15–46)
IRON SATN MFR SERPL: NORMAL % (ref 15–46)
IRON SATN MFR SERPL: NORMAL % (ref 15–46)
IRON SERPL-MCNC: 24 UG/DL (ref 35–180)
IRON SERPL-MCNC: NORMAL UG/DL (ref 35–180)
IRON SERPL-MCNC: NORMAL UG/DL (ref 35–180)
LYMPHOCYTES # BLD AUTO: 1.5 10E9/L (ref 0.8–5.3)
LYMPHOCYTES NFR BLD AUTO: 17.1 %
MCH RBC QN AUTO: 27 PG (ref 26.5–33)
MCHC RBC AUTO-ENTMCNC: 29.2 G/DL (ref 31.5–36.5)
MCV RBC AUTO: 93 FL (ref 78–100)
MONOCYTES # BLD AUTO: 0.6 10E9/L (ref 0–1.3)
MONOCYTES NFR BLD AUTO: 7.2 %
NEUTROPHILS # BLD AUTO: 6.4 10E9/L (ref 1.6–8.3)
NEUTROPHILS NFR BLD AUTO: 71.8 %
NRBC # BLD AUTO: 0 10*3/UL
NRBC BLD AUTO-RTO: 0 /100
PLATELET # BLD AUTO: 248 10E9/L (ref 150–450)
POTASSIUM SERPL-SCNC: 3.8 MMOL/L (ref 3.4–5.3)
POTASSIUM SERPL-SCNC: 5.3 MMOL/L (ref 3.4–5.3)
POTASSIUM SERPL-SCNC: 6 MMOL/L (ref 3.4–5.3)
RBC # BLD AUTO: 2.96 10E12/L (ref 3.8–5.2)
SODIUM SERPL-SCNC: 142 MMOL/L (ref 133–144)
TIBC SERPL-MCNC: 322 UG/DL (ref 240–430)
TIBC SERPL-MCNC: NORMAL UG/DL (ref 240–430)
TIBC SERPL-MCNC: NORMAL UG/DL (ref 240–430)
WBC # BLD AUTO: 8.9 10E9/L (ref 4–11)

## 2018-07-14 PROCEDURE — A9270 NON-COVERED ITEM OR SERVICE: HCPCS | Mod: GY | Performed by: PHYSICAL MEDICINE & REHABILITATION

## 2018-07-14 PROCEDURE — 25000132 ZZH RX MED GY IP 250 OP 250 PS 637: Mod: GY | Performed by: PHYSICAL MEDICINE & REHABILITATION

## 2018-07-14 PROCEDURE — 84132 ASSAY OF SERUM POTASSIUM: CPT | Performed by: PHYSICAL MEDICINE & REHABILITATION

## 2018-07-14 PROCEDURE — 80048 BASIC METABOLIC PNL TOTAL CA: CPT | Performed by: STUDENT IN AN ORGANIZED HEALTH CARE EDUCATION/TRAINING PROGRAM

## 2018-07-14 PROCEDURE — 36592 COLLECT BLOOD FROM PICC: CPT | Performed by: PHYSICAL MEDICINE & REHABILITATION

## 2018-07-14 PROCEDURE — 97535 SELF CARE MNGMENT TRAINING: CPT | Mod: GO

## 2018-07-14 PROCEDURE — 12800006 ZZH R&B REHAB

## 2018-07-14 PROCEDURE — 83550 IRON BINDING TEST: CPT | Performed by: PHYSICAL MEDICINE & REHABILITATION

## 2018-07-14 PROCEDURE — 40000133 ZZH STATISTIC OT WARD VISIT

## 2018-07-14 PROCEDURE — 83540 ASSAY OF IRON: CPT | Performed by: PHYSICAL MEDICINE & REHABILITATION

## 2018-07-14 PROCEDURE — 40000193 ZZH STATISTIC PT WARD VISIT

## 2018-07-14 PROCEDURE — 25000131 ZZH RX MED GY IP 250 OP 636 PS 637: Mod: GY | Performed by: STUDENT IN AN ORGANIZED HEALTH CARE EDUCATION/TRAINING PROGRAM

## 2018-07-14 PROCEDURE — 97162 PT EVAL MOD COMPLEX 30 MIN: CPT | Mod: GP

## 2018-07-14 PROCEDURE — 36415 COLL VENOUS BLD VENIPUNCTURE: CPT | Performed by: PHYSICAL MEDICINE & REHABILITATION

## 2018-07-14 PROCEDURE — A9270 NON-COVERED ITEM OR SERVICE: HCPCS | Mod: GY | Performed by: STUDENT IN AN ORGANIZED HEALTH CARE EDUCATION/TRAINING PROGRAM

## 2018-07-14 PROCEDURE — 82728 ASSAY OF FERRITIN: CPT | Performed by: STUDENT IN AN ORGANIZED HEALTH CARE EDUCATION/TRAINING PROGRAM

## 2018-07-14 PROCEDURE — 00000146 ZZHCL STATISTIC GLUCOSE BY METER IP

## 2018-07-14 PROCEDURE — 85025 COMPLETE CBC W/AUTO DIFF WBC: CPT | Performed by: STUDENT IN AN ORGANIZED HEALTH CARE EDUCATION/TRAINING PROGRAM

## 2018-07-14 PROCEDURE — 97116 GAIT TRAINING THERAPY: CPT | Mod: GP

## 2018-07-14 PROCEDURE — 97530 THERAPEUTIC ACTIVITIES: CPT | Mod: GP

## 2018-07-14 PROCEDURE — 97166 OT EVAL MOD COMPLEX 45 MIN: CPT | Mod: GO

## 2018-07-14 PROCEDURE — 36592 COLLECT BLOOD FROM PICC: CPT | Performed by: STUDENT IN AN ORGANIZED HEALTH CARE EDUCATION/TRAINING PROGRAM

## 2018-07-14 PROCEDURE — 25000128 H RX IP 250 OP 636: Performed by: STUDENT IN AN ORGANIZED HEALTH CARE EDUCATION/TRAINING PROGRAM

## 2018-07-14 PROCEDURE — 25000132 ZZH RX MED GY IP 250 OP 250 PS 637: Mod: GY | Performed by: STUDENT IN AN ORGANIZED HEALTH CARE EDUCATION/TRAINING PROGRAM

## 2018-07-14 RX ORDER — POTASSIUM CHLORIDE 750 MG/1
20-40 TABLET, EXTENDED RELEASE ORAL
Status: DISCONTINUED | OUTPATIENT
Start: 2018-07-14 | End: 2018-07-23 | Stop reason: HOSPADM

## 2018-07-14 RX ORDER — POTASSIUM CL/LIDO/0.9 % NACL 10MEQ/0.1L
10 INTRAVENOUS SOLUTION, PIGGYBACK (ML) INTRAVENOUS
Status: DISCONTINUED | OUTPATIENT
Start: 2018-07-14 | End: 2018-07-23 | Stop reason: HOSPADM

## 2018-07-14 RX ORDER — ACETAMINOPHEN 325 MG/1
325 TABLET ORAL ONCE
Status: COMPLETED | OUTPATIENT
Start: 2018-07-14 | End: 2018-07-14

## 2018-07-14 RX ORDER — LANOLIN ALCOHOL/MO/W.PET/CERES
500 CREAM (GRAM) TOPICAL 2 TIMES DAILY
Status: DISCONTINUED | OUTPATIENT
Start: 2018-07-14 | End: 2018-07-23 | Stop reason: HOSPADM

## 2018-07-14 RX ORDER — ACETAMINOPHEN 325 MG/1
325-650 TABLET ORAL EVERY 8 HOURS
Status: DISCONTINUED | OUTPATIENT
Start: 2018-07-14 | End: 2018-07-16

## 2018-07-14 RX ORDER — POTASSIUM CHLORIDE 1.5 G/1.58G
20-40 POWDER, FOR SOLUTION ORAL
Status: DISCONTINUED | OUTPATIENT
Start: 2018-07-14 | End: 2018-07-23 | Stop reason: HOSPADM

## 2018-07-14 RX ORDER — POTASSIUM CHLORIDE 29.8 MG/ML
20 INJECTION INTRAVENOUS
Status: DISCONTINUED | OUTPATIENT
Start: 2018-07-14 | End: 2018-07-23 | Stop reason: HOSPADM

## 2018-07-14 RX ORDER — POTASSIUM CHLORIDE 7.45 MG/ML
10 INJECTION INTRAVENOUS
Status: DISCONTINUED | OUTPATIENT
Start: 2018-07-14 | End: 2018-07-23 | Stop reason: HOSPADM

## 2018-07-14 RX ADMIN — INSULIN ASPART 2 UNITS: 100 INJECTION, SOLUTION INTRAVENOUS; SUBCUTANEOUS at 13:52

## 2018-07-14 RX ADMIN — PREGABALIN 100 MG: 100 CAPSULE ORAL at 08:24

## 2018-07-14 RX ADMIN — HEPARIN SODIUM 5000 UNITS: 5000 INJECTION, SOLUTION INTRAVENOUS; SUBCUTANEOUS at 06:41

## 2018-07-14 RX ADMIN — SODIUM CHLORIDE, PRESERVATIVE FREE 10 ML: 5 INJECTION INTRAVENOUS at 20:23

## 2018-07-14 RX ADMIN — ACETAMINOPHEN 325 MG: 325 TABLET, FILM COATED ORAL at 11:16

## 2018-07-14 RX ADMIN — ATORVASTATIN CALCIUM 40 MG: 40 TABLET, FILM COATED ORAL at 20:56

## 2018-07-14 RX ADMIN — FEBUXOSTAT 40 MG: 40 TABLET ORAL at 20:56

## 2018-07-14 RX ADMIN — NIACIN 500 MG: 500 TABLET, EXTENDED RELEASE ORAL at 10:28

## 2018-07-14 RX ADMIN — METOPROLOL TARTRATE 75 MG: 25 TABLET, FILM COATED ORAL at 20:55

## 2018-07-14 RX ADMIN — INSULIN ASPART 2 UNITS: 100 INJECTION, SOLUTION INTRAVENOUS; SUBCUTANEOUS at 08:31

## 2018-07-14 RX ADMIN — ASPIRIN 325 MG: 325 TABLET, DELAYED RELEASE ORAL at 08:24

## 2018-07-14 RX ADMIN — HEPARIN SODIUM 5000 UNITS: 5000 INJECTION, SOLUTION INTRAVENOUS; SUBCUTANEOUS at 20:54

## 2018-07-14 RX ADMIN — ACETAMINOPHEN 650 MG: 325 TABLET, FILM COATED ORAL at 23:54

## 2018-07-14 RX ADMIN — BUMETANIDE 2 MG: 2 TABLET ORAL at 16:47

## 2018-07-14 RX ADMIN — ACETAMINOPHEN 325 MG: 325 TABLET, FILM COATED ORAL at 08:22

## 2018-07-14 RX ADMIN — ACETAMINOPHEN 650 MG: 325 TABLET, FILM COATED ORAL at 16:47

## 2018-07-14 RX ADMIN — CHOLECALCIFEROL CAP 125 MCG (5000 UNIT) 10000 UNITS: 125 CAP at 08:25

## 2018-07-14 RX ADMIN — INSULIN ASPART 1 UNITS: 100 INJECTION, SOLUTION INTRAVENOUS; SUBCUTANEOUS at 20:17

## 2018-07-14 RX ADMIN — GUAIFENESIN 600 MG: 600 TABLET, EXTENDED RELEASE ORAL at 20:56

## 2018-07-14 RX ADMIN — ESCITALOPRAM OXALATE 20 MG: 20 TABLET ORAL at 08:24

## 2018-07-14 RX ADMIN — CIPROFLOXACIN HYDROCHLORIDE 250 MG: 250 TABLET, FILM COATED ORAL at 08:22

## 2018-07-14 RX ADMIN — PANTOPRAZOLE SODIUM 40 MG: 40 TABLET, DELAYED RELEASE ORAL at 08:25

## 2018-07-14 RX ADMIN — HYDRALAZINE HYDROCHLORIDE 75 MG: 50 TABLET ORAL at 08:26

## 2018-07-14 RX ADMIN — BUMETANIDE 2 MG: 2 TABLET ORAL at 08:25

## 2018-07-14 RX ADMIN — Medication 3 MG: at 22:29

## 2018-07-14 RX ADMIN — FLUTICASONE FUROATE AND VILANTEROL TRIFENATATE 1 PUFF: 100; 25 POWDER RESPIRATORY (INHALATION) at 08:21

## 2018-07-14 RX ADMIN — INSULIN ASPART 2 UNITS: 100 INJECTION, SOLUTION INTRAVENOUS; SUBCUTANEOUS at 22:36

## 2018-07-14 RX ADMIN — ACETAMINOPHEN 800 MCG: 400 TABLET ORAL at 08:25

## 2018-07-14 RX ADMIN — TRAZODONE HYDROCHLORIDE 150 MG: 50 TABLET ORAL at 22:29

## 2018-07-14 RX ADMIN — CIPROFLOXACIN HYDROCHLORIDE 250 MG: 250 TABLET, FILM COATED ORAL at 20:58

## 2018-07-14 RX ADMIN — LEVOTHYROXINE SODIUM 150 MCG: 25 TABLET ORAL at 08:22

## 2018-07-14 RX ADMIN — INSULIN HUMAN 35 UNITS: 100 INJECTION, SUSPENSION SUBCUTANEOUS at 08:33

## 2018-07-14 RX ADMIN — HEPARIN SODIUM 5000 UNITS: 5000 INJECTION, SOLUTION INTRAVENOUS; SUBCUTANEOUS at 13:59

## 2018-07-14 RX ADMIN — GUAIFENESIN 600 MG: 600 TABLET, EXTENDED RELEASE ORAL at 08:25

## 2018-07-14 RX ADMIN — INSULIN HUMAN 30 UNITS: 100 INJECTION, SUSPENSION SUBCUTANEOUS at 21:02

## 2018-07-14 RX ADMIN — METOPROLOL TARTRATE 75 MG: 25 TABLET, FILM COATED ORAL at 08:27

## 2018-07-14 RX ADMIN — NIACIN ER 500 MG TABLET,EXTENDED RELEASE 500 MG: at 23:54

## 2018-07-14 RX ADMIN — PREGABALIN 100 MG: 100 CAPSULE ORAL at 20:57

## 2018-07-14 ASSESSMENT — ACTIVITIES OF DAILY LIVING (ADL): PREVIOUS_RESPONSIBILITIES: MEAL PREP;SHOPPING;MEDICATION MANAGEMENT;FINANCES;DRIVING

## 2018-07-14 NOTE — PROGRESS NOTES
[facility] Unit Progress Note    Patient Name: Mariel Ribeiro   YOB: 1945  MRN: [id number]    Age / Sex: 73 year old female    ASSESSMENT/PLAN:  Mariel Ribeiro is a 73 year old lady in acute rehab for cardiac rehabilitation following CABG on 2 Jul 2018. Patient progressing through therapies, with current sustaining cares meeting needs. See primary team note for details.  - EMR reviewed, no acute overnight events, vital stable  - labs reviewed, K+ and Iron labs were hemolyzed, repeat K was normal.  Will discontinue daily K+ and switch pt to K protocol and recheck BMP tomorrow; still pending iron studies, imaging none new  - continue multidisciplinary therapies and plan of care.  - Discussed T-Pump vs. Lidoderm patches for pain relief.  Will try T-Pump as first line, change tylenol dosage to increase to 650 mg [id number].  - Wound care consult placed for new wound along sacrum    SUBJECTIVE/INTERVAL HX:    Patient was seen and examined at bedside rounds. Reports feeling well. Slept well but still has shoulder pain. Denies fevers/chills, shortness of breath, chest pain, bowel/bladder changes, or new pain.     CURRENT INTERVENTIONS:  Current Facility-Administered Medications   Medication     acetaminophen (TYLENOL) tablet 325-650 mg     albuterol (PROAIR HFA/PROVENTIL HFA/VENTOLIN HFA) Inhaler 1-2 puff     albuterol neb solution 2.5 mg     artificial saliva (BIOTENE MT) solution 1 spray     aspirin EC tablet 325 mg     atorvastatin (LIPITOR) tablet 40 mg     bumetanide (BUMEX) tablet 2 mg     cholecalciferol (vitamin D3) capsule CAPS 10,000 Units     ciprofloxacin (CIPRO) tablet 250 mg     cyclobenzaprine (FLEXERIL) tablet 10 mg     glucose gel 15-30 g    Or     dextrose 50 % injection 25-50 mL    Or     glucagon injection 1 mg     EPINEPHrine (ADRENALIN) kit 0.3 mg     escitalopram (LEXAPRO) tablet 20 mg     febuxostat (ULORIC) tablet 40 mg      fluticasone-vilanterol (BREO ELLIPTA) 100-25 MCG/INH oral inhaler 1 puff     folic acid (FOLVITE) tablet 800 mcg     guaiFENesin (MUCINEX) 12 hr tablet 600 mg     heparin lock flush 10 UNIT/ML injection 5-10 mL     heparin lock flush 10 UNIT/ML injection 5-10 mL     heparin sodium PF injection 5,000 Units     hydrALAZINE (APRESOLINE) tablet 75 mg     HYDROmorphone (DILAUDID) tablet 2 mg     hypromellose-dextran (ARTIFICAL TEARS) 0.1-0.3 % ophthalmic solution 1 drop     insulin aspart (NovoLOG) inj (RAPID ACTING)     insulin aspart (NovoLOG) inj (RAPID ACTING)     insulin aspart (NovoLOG) inj (RAPID ACTING)     insulin aspart (NovoLOG) inj (RAPID ACTING)     insulin isophane human (HumuLIN N PEN) injection 30 Units     insulin isophane human (HumuLIN N PEN) injection 35 Units     levothyroxine (SYNTHROID/LEVOTHROID) tablet 150 mcg     lidocaine (LMX4) kit     lidocaine 1 % 1 mL     melatonin tablet 3 mg     metoprolol tartrate (LOPRESSOR) tablet 75 mg     naloxone (NARCAN) injection 0.1-0.4 mg     niacin (NIASPAN) CR tablet 500 mg     nitroGLYcerin (NITROSTAT) sublingual tablet 0.4 mg     pantoprazole (PROTONIX) EC tablet 40 mg     Patient is already receiving anticoagulation with heparin, enoxaparin (LOVENOX), warfarin (COUMADIN)  or other anticoagulant medication     polyethylene glycol (MIRALAX/GLYCOLAX) Packet 17 g     potassium chloride SA (K-DUR/KLOR-CON M) CR tablet 60 mEq     pregabalin (LYRICA) capsule 100 mg     senna-docusate (SENOKOT-S;PERICOLACE) 8.6-50 MG per tablet 1-2 tablet     sodium chloride (PF) 0.9% PF flush 10-20 mL     traZODone (DESYREL) tablet 150 mg       PHYSICAL EXAM:  Vitals: Temp: 98.2  F (36.8  C) Temp src: Oral BP: 156/58 Pulse: 91   Resp: 16 SpO2: 96 % O2 Device: None (Room air) Oxygen Delivery: 1 LPM  Gen: No acute distress, pleasant  HEENT: hearing present to speaking voice  CVS: nl HR, severe extremity lymphedema noted  Resp: breathing unlabored at rest on room air  MSK: moving all  limbs spontaneously  Neuro: AOx3, communicative  Psych: nl affect  Skin: 3 cm x 0.5 cm stage 2 wound noted along gluteal cleft at sacrum with mild erythema noted, slightly tender to palpation, not out of proportion.        Zeeshan Aaron,   PGY-2 PM&R Resident  [facility]  Pager: [phone]    Patient seen by me and discussed with Dr Aaron. I agree with his note, exam and plan for this patient. Total time spent >25 min with chart review, patient exam, rehab plan and dictation. >50% time spent face to face and with patient care coordination.    Edgar Singer MD

## 2018-07-14 NOTE — PROGRESS NOTES
07/14/18 1100   Living Environment   Lives With other (see comments)  (pt lives with a friend not 24 hour assist)   Living Arrangements house   Home Accessibility stairs (2 railings present);stairs to enter home   Number of Stairs to Enter Home 2   Number of Stairs Within Home 0   Stair Railings at Home outside, present at both sides   Transportation Available car   Self-Care   Dominant Hand right   Usual Activity Tolerance fair   Current Activity Tolerance poor   Regular Exercise yes   Activity/Exercise Type strength training;walking   Exercise Amount/Frequency daily   Equipment Currently Used at Home cane, straight;grab bar;shower chair;walker, rolling   Activity/Exercise/Self-Care Comment walks her dog daily   Functional Level Prior   Ambulation 1-->assistive equipment   Transferring 1-->assistive equipment   Toileting 1-->assistive equipment   Bathing 3-->assistive equipment and person   Dressing 3-->assistive equipment and person   Eating 0-->independent   Communication 0-->understands/communicates without difficulty   Swallowing 0-->swallows foods/liquids without difficulty   Cognition 1 - attention or memory deficits   Fall history within last six months yes   Number of times patient has fallen within last six months 3   Which of the above functional risks had a recent onset or change? ambulation;transferring;toileting;bathing;dressing;fall history   Prior Functional Level Comment typically modified indep with mobility and ADL's occasional assist with bathing/dressing as noted above;  falls    General Information   Onset of Illness/Injury or Date of Surgery - Date 07/02/18  (cabg x 3)   Referring Physician Judie Johnson MD   Patient/Family Goals Statement Return home with her roommate, dog, decrease fall risk   Pertinent History of Current Problem (include personal factors and/or comorbidities that impact the POC) aMriel Ribeiro is a 73 year old female with a past medical history of DM2, HTN, CKD3,  "HLD, COPD, fibromyalgia, anxiety, depression, lymphedema, thyroid cancer s/p resection, and multivessel CAD who presented with with unstable angina. Patient had recurrent chest pain and her CABG was moved to 7/2/2018. On 7/2/2018 she underwent a 3 vessel CABG (LIMA to LAD, SVG to R PDA, and SVG to OMA2).\"  per MD H&P;  pt reports history of diabetes, peripherial neuropathy and L UE numbness and tingling that \"comes and goes;\"  pt has a strong fall history.    Precautions/Limitations fall precautions;sternal precautions   Weight-Bearing Status - LUE partial weight-bearing (% in comments)  (sternal)   Weight-Bearing Status - RUE partial weight-bearing (% in comments)  (sternal)   Weight-Bearing Status - LLE weight-bearing as tolerated   Weight-Bearing Status - RLE weight-bearing as tolerated   Heart Disease Risk Factors Diabetes;High blood pressure;Overweight;Family history   General Info Comments good mechanics, following precautions well   Cognitive Status Examination   Orientation orientation to person, place and time   Level of Consciousness alert   Follows Commands and Answers Questions 100% of the time   Personal Safety and Judgment intact   Memory intact  (notes some memory issues s/p falls)   Pain Assessment   Patient Currently in Pain No   Integumentary/Edema   Integumentary/Edema other (describe)   Integumentary/Edema Comments PT: MD present and inspected sacral wound, glutea   Posture    Posture Comments PT: sacral sitting (education provided)   Range of Motion (ROM)   ROM Comment WNL's within sternal precautions   Strength   Strength Comments PT: 4+/5 all major LE muscles   ARC Assessment Only   Acute Rehab FIM See FIM scores for Mobility/ADL Assessment   Sensory Examination   Sensory Perception Comments PT: intact to light touch localization except distal feet; also report subjective changes in L UE (has had this on and off for years)   Coordination   Coordination Comments WNL's   Muscle Tone   Muscle " Tone Comments WNL's   Modality Interventions   Planned Modality Interventions Cryotherapy  (heat)   General Therapy Interventions   Planned Therapy Interventions balance training;bed mobility training;gait training;groups;ROM;strengthening;stretching;transfer training;risk factor education;home program guidelines;progressive activity/exercise   Clinical Impression   Criteria for Skilled Therapeutic Intervention yes, treatment indicated   PT Diagnosis Pt demonstrates impaired mobility, balance, gait, and functional activity tolerance s/p CABG x 3 on 7/2/18, this is complicted by impaired sensation in B LE's, diabetes, fall history.  She is appropriate for skilled acute PT in an intensive setting to return home modified indep   Influenced by the following impairments as in PT diagnosis   Functional limitations due to impairments impaired gait, balance, transfers, ADL's, falls   Clinical Presentation Evolving/Changing   Clinical Presentation Rationale medical complexity, edema, impaired sensation, post surgical status   Clinical Decision Making (Complexity) Moderate complexity   Therapy Frequency` daily   Predicted Duration of Therapy Intervention (days/wks) 7-10 days   Anticipated Equipment Needs at Discharge other (see comments)  (has equipment, continue to assess)   Anticipated Discharge Disposition Home;Home with Outpatient Therapy   Risk & Benefits of therapy have been explained Yes   Patient, Family & other staff in agreement with plan of care Yes   Clinical Impression Comments tolerated evaluation well   Total Evaluation Time   Total Evaluation Time (Minutes) 30       PT eval complete: tolerated short distance gait and transfers, pain with logroll 3-7/10, returned to baseline post; improved with PT cues supine to sit.  Functionally limited tolerance.  Vitals checked this pm and were unremarkable.     Eric Sanchez, PT  7/14/2018

## 2018-07-14 NOTE — PLAN OF CARE
Problem: Goal/Outcome  Goal: Goal Outcome Summary  FOCUS/GOAL  Bowel management, Bladder management, Wound care management, Mobility and Equipment/Assistive devices    ASSESSMENT, INTERVENTIONS AND CONTINUING PLAN FOR GOAL:    Patient is A&Ox4, continent of bowel/bladder, uses toilet in bathroom to void, no BM this shift. Patient c/o CONNER, scheduled Tylenol given. B, coverage given. PVRs: 50, 0. Patient transfers CGA with a gait belt/cane. Double lumen PICC in right arm, patent and heparin locked. Lymphedema wraps on BLE. CPAP on at HS. Patient slept in the bed for a few hours and then was transferred to the lounge chair to sleep. Patient had CABG x3, chest incision is IMANI, closed with surgical glue, scabbing. Drain sites are open, red and scabbing, dressings applied, some scant serous drainage. Continue POC.

## 2018-07-14 NOTE — PROGRESS NOTES
"                     Diabetes Consult Daily  Progress Note          Assessment/Plan:                          Mariel Ribeiro is a 73 year old female with a history of type 2 diabetes,obesity, COPD, CAD with history of stent placement, CKD stage III, hyperlipidemia,fibromyalgia, anxiety, depression, lymphedema, S/P CABG on 07/ 02/18.                    PLAN  -NPH 35 units am ( 0900)  -NPH 30 units PM ( 2000)  -aspart for meals and snacks: 1 unit per 5 grams of CHO (order corrected)  -aspart for correction  high intensity ac and hs  -monitor glucose ac, hs, and 0200     At discharge we will need to switch pt from aspart to regular insulin (due to cost).          Outpatient diabetes follow up: Pt and SO would like to consider seeing endocrinologist- ph number for [facility] included in AVS.  Plan discussed with patient                    .           Interval History:   The last 24 hours progress and nursing notes reviewed.  Mariel transferred to ARU yesterday.  Glucose had been in good control prior to transfer.  However, in evening hyperglycemia (300s) occurred post supper b/c pt did not get correct dose of meal aspart.  It appears there was issue with meal aspart order: stated 1unit/5g CHO but at the top dose amount showed \"1 unit\"--- pt had late lunch at 1600 and supper later on-- total carbs at BOTH meals, per pt, [id number] CHO.  She got only 1 unit in the evening with supper.  Order has now been corrected (will discuss with pharmacy).  Creatinine stable at 1.4.    Mariel complains of shoulder pain- ongoing.  Appetite is good.    Will need to transition from Novolog to Regular insulin on discharge from ARU.     PTA:   NPH 35/40  Regular 16 to 18 units breakfast and pre-lunch  Dinner 10-12 units    Recent Labs  Lab 07/14/18  0722 07/14/18  0700 07/13/18  2124 07/13/18  1657 07/13/18  1209 07/13/18  0907 07/13/18  0507 07/13/18  0232  07/12/18  0612  07/11/18  0611  07/10/18  0603  07/09/18  0555   GLC  --  " "156*  --   --   --   --  130*  --   --  114*  --  142*  --  83  --  78   *  --  301* 172* 174* 149*  --  123*  < >  --   < >  --   < >  --   < >  --    < > = values in this interval not displayed.            Review of Systems:   See interval hx          Medications:       Active Diet Order      Combination Diet 7541-6217 Calories: High Consistent CHO (4-7 CHO units/meal); Thin Liquids (water, ice chips, juice, milk, gelatin, ice cream, etc)     Physical Exam:  Gen: Alert, sitting on couch,NAD   HEENT:  mucous membranes are moist  Resp: unlabored on RA  Neuro:oriented x3, communicating clearly  /58 (BP Location: Left arm)  Pulse 91  Temp 98.2  F (36.8  C) (Oral)  Resp 16  Ht 1.676 m (5' 6\")  Wt 141.1 kg (311 lb)  SpO2 96%  BMI 50.2 kg/m2           Data:     Lab Results   Component Value Date    A1C 9.3 07/01/2018    A1C 8.2 03/19/2018    A1C 7.3 08/09/2017    A1C 7.2 03/08/2017    A1C 7.3 12/23/2016            Recent Labs   Lab Test  07/14/18   0700  07/13/18   0507   NA  142  140   POTASSIUM  6.0*  3.6   CHLORIDE  103  102   CO2  31  30   ANIONGAP  8  7   GLC  156*  130*   BUN  36*  37*   CR  1.42*  1.41*   ANTOINETTE  9.0  8.7     CBC RESULTS:   Recent Labs   Lab Test  07/14/18   0700   WBC  8.9   RBC  2.96*   HGB  8.0*   HCT  27.4*   MCV  93   MCH  27.0   MCHC  29.2*   RDW  16.8*   PLT  248     Kia Hdz PA-C [phone]  Diabetes Management job code [id number]            "

## 2018-07-14 NOTE — PLAN OF CARE
Problem: Goal/Outcome  Goal: Goal Outcome Summary  OT: Completed eval and pt demonstrates deficits in LUE AROM/sensation/coordination, activity tolerance/endurance, in addition to significant pain, co-morbities, and sternal precautions. Plan to d/c pt home with assist with OP services in 7-10 days.

## 2018-07-14 NOTE — PLAN OF CARE
Problem: Goal/Outcome  Goal: Goal Outcome Summary  FOCUS/GOAL  Bowel management, Bladder management and Pain management    ASSESSMENT, INTERVENTIONS AND CONTINUING PLAN FOR GOAL:    Patient is A&Ox4, continent of bowel/bladder. Patient was up once and had a BM and voided. Slept most of the night wearing her CPAP in the reclining chair. Continue POC.

## 2018-07-14 NOTE — PLAN OF CARE
Problem: Goal/Outcome  Goal: Goal Outcome Summary  Up in room and to BR with cane and CGA. Hydralazine held this afternoon per parameters for DBP of 42. Some discomfort of shoulders will try warmth per T-pump. Tylenol scheduled and helps with pain.
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
## 2018-07-15 LAB
ANION GAP SERPL CALCULATED.3IONS-SCNC: 8 MMOL/L (ref 3–14)
BUN SERPL-MCNC: 41 MG/DL (ref 7–30)
CALCIUM SERPL-MCNC: 8.8 MG/DL (ref 8.5–10.1)
CHLORIDE SERPL-SCNC: 103 MMOL/L (ref 94–109)
CO2 SERPL-SCNC: 31 MMOL/L (ref 20–32)
CREAT SERPL-MCNC: 1.57 MG/DL (ref 0.52–1.04)
GFR SERPL CREATININE-BSD FRML MDRD: 32 ML/MIN/1.7M2
GLUCOSE BLDC GLUCOMTR-MCNC: 122 MG/DL (ref 70–99)
GLUCOSE BLDC GLUCOMTR-MCNC: 142 MG/DL (ref 70–99)
GLUCOSE BLDC GLUCOMTR-MCNC: 144 MG/DL (ref 70–99)
GLUCOSE BLDC GLUCOMTR-MCNC: 151 MG/DL (ref 70–99)
GLUCOSE BLDC GLUCOMTR-MCNC: 235 MG/DL (ref 70–99)
GLUCOSE BLDC GLUCOMTR-MCNC: 261 MG/DL (ref 70–99)
GLUCOSE SERPL-MCNC: 128 MG/DL (ref 70–99)
POTASSIUM SERPL-SCNC: 3.5 MMOL/L (ref 3.4–5.3)
SODIUM SERPL-SCNC: 142 MMOL/L (ref 133–144)

## 2018-07-15 PROCEDURE — A9270 NON-COVERED ITEM OR SERVICE: HCPCS | Mod: GY | Performed by: PHYSICAL MEDICINE & REHABILITATION

## 2018-07-15 PROCEDURE — 00000146 ZZHCL STATISTIC GLUCOSE BY METER IP

## 2018-07-15 PROCEDURE — 12800006 ZZH R&B REHAB

## 2018-07-15 PROCEDURE — 25000132 ZZH RX MED GY IP 250 OP 250 PS 637: Mod: GY | Performed by: PHYSICAL MEDICINE & REHABILITATION

## 2018-07-15 PROCEDURE — 25000132 ZZH RX MED GY IP 250 OP 250 PS 637: Mod: GY | Performed by: GENERAL PRACTICE

## 2018-07-15 PROCEDURE — 25000132 ZZH RX MED GY IP 250 OP 250 PS 637: Mod: GY | Performed by: STUDENT IN AN ORGANIZED HEALTH CARE EDUCATION/TRAINING PROGRAM

## 2018-07-15 PROCEDURE — 97530 THERAPEUTIC ACTIVITIES: CPT | Mod: GO

## 2018-07-15 PROCEDURE — A9270 NON-COVERED ITEM OR SERVICE: HCPCS | Mod: GY | Performed by: STUDENT IN AN ORGANIZED HEALTH CARE EDUCATION/TRAINING PROGRAM

## 2018-07-15 PROCEDURE — A9270 NON-COVERED ITEM OR SERVICE: HCPCS | Mod: GY | Performed by: GENERAL PRACTICE

## 2018-07-15 PROCEDURE — 80048 BASIC METABOLIC PNL TOTAL CA: CPT | Performed by: GENERAL PRACTICE

## 2018-07-15 PROCEDURE — 97116 GAIT TRAINING THERAPY: CPT | Mod: GP

## 2018-07-15 PROCEDURE — 40000133 ZZH STATISTIC OT WARD VISIT

## 2018-07-15 PROCEDURE — 25000128 H RX IP 250 OP 636: Performed by: STUDENT IN AN ORGANIZED HEALTH CARE EDUCATION/TRAINING PROGRAM

## 2018-07-15 PROCEDURE — 36592 COLLECT BLOOD FROM PICC: CPT | Performed by: GENERAL PRACTICE

## 2018-07-15 PROCEDURE — 40000193 ZZH STATISTIC PT WARD VISIT

## 2018-07-15 PROCEDURE — 97535 SELF CARE MNGMENT TRAINING: CPT | Mod: GO

## 2018-07-15 PROCEDURE — 97530 THERAPEUTIC ACTIVITIES: CPT | Mod: GP

## 2018-07-15 RX ORDER — LIDOCAINE 4 G/G
3 PATCH TOPICAL
Status: DISCONTINUED | OUTPATIENT
Start: 2018-07-15 | End: 2018-07-23 | Stop reason: HOSPADM

## 2018-07-15 RX ADMIN — POLYETHYLENE GLYCOL 3350 17 G: 17 POWDER, FOR SOLUTION ORAL at 10:35

## 2018-07-15 RX ADMIN — PREGABALIN 100 MG: 100 CAPSULE ORAL at 21:39

## 2018-07-15 RX ADMIN — INSULIN ASPART 4 UNITS: 100 INJECTION, SOLUTION INTRAVENOUS; SUBCUTANEOUS at 21:41

## 2018-07-15 RX ADMIN — GUAIFENESIN 600 MG: 600 TABLET, EXTENDED RELEASE ORAL at 21:39

## 2018-07-15 RX ADMIN — GUAIFENESIN 600 MG: 600 TABLET, EXTENDED RELEASE ORAL at 08:19

## 2018-07-15 RX ADMIN — METOPROLOL TARTRATE 75 MG: 25 TABLET, FILM COATED ORAL at 08:21

## 2018-07-15 RX ADMIN — LEVOTHYROXINE SODIUM 150 MCG: 25 TABLET ORAL at 08:20

## 2018-07-15 RX ADMIN — BUMETANIDE 2 MG: 2 TABLET ORAL at 16:50

## 2018-07-15 RX ADMIN — INSULIN ASPART 1 UNITS: 100 INJECTION, SOLUTION INTRAVENOUS; SUBCUTANEOUS at 06:52

## 2018-07-15 RX ADMIN — INSULIN HUMAN 35 UNITS: 100 INJECTION, SUSPENSION SUBCUTANEOUS at 08:28

## 2018-07-15 RX ADMIN — NIACIN ER 500 MG TABLET,EXTENDED RELEASE 500 MG: at 21:39

## 2018-07-15 RX ADMIN — CHOLECALCIFEROL CAP 125 MCG (5000 UNIT) 10000 UNITS: 125 CAP at 08:19

## 2018-07-15 RX ADMIN — BUMETANIDE 2 MG: 2 TABLET ORAL at 08:19

## 2018-07-15 RX ADMIN — PREGABALIN 100 MG: 100 CAPSULE ORAL at 08:34

## 2018-07-15 RX ADMIN — HYDRALAZINE HYDROCHLORIDE 75 MG: 50 TABLET ORAL at 21:40

## 2018-07-15 RX ADMIN — CIPROFLOXACIN HYDROCHLORIDE 250 MG: 250 TABLET, FILM COATED ORAL at 21:40

## 2018-07-15 RX ADMIN — NIACIN ER 500 MG TABLET,EXTENDED RELEASE 500 MG: at 08:20

## 2018-07-15 RX ADMIN — CIPROFLOXACIN HYDROCHLORIDE 250 MG: 250 TABLET, FILM COATED ORAL at 08:20

## 2018-07-15 RX ADMIN — ASPIRIN 325 MG: 325 TABLET, DELAYED RELEASE ORAL at 08:19

## 2018-07-15 RX ADMIN — TRAZODONE HYDROCHLORIDE 150 MG: 50 TABLET ORAL at 21:39

## 2018-07-15 RX ADMIN — ATORVASTATIN CALCIUM 40 MG: 40 TABLET, FILM COATED ORAL at 21:40

## 2018-07-15 RX ADMIN — INSULIN HUMAN 30 UNITS: 100 INJECTION, SUSPENSION SUBCUTANEOUS at 21:42

## 2018-07-15 RX ADMIN — FLUTICASONE FUROATE AND VILANTEROL TRIFENATATE 1 PUFF: 100; 25 POWDER RESPIRATORY (INHALATION) at 08:22

## 2018-07-15 RX ADMIN — HEPARIN SODIUM 5000 UNITS: 5000 INJECTION, SOLUTION INTRAVENOUS; SUBCUTANEOUS at 04:36

## 2018-07-15 RX ADMIN — PANTOPRAZOLE SODIUM 40 MG: 40 TABLET, DELAYED RELEASE ORAL at 06:37

## 2018-07-15 RX ADMIN — HEPARIN SODIUM 5000 UNITS: 5000 INJECTION, SOLUTION INTRAVENOUS; SUBCUTANEOUS at 21:41

## 2018-07-15 RX ADMIN — ACETAMINOPHEN 800 MCG: 400 TABLET ORAL at 08:20

## 2018-07-15 RX ADMIN — FEBUXOSTAT 40 MG: 40 TABLET ORAL at 21:40

## 2018-07-15 RX ADMIN — ACETAMINOPHEN 650 MG: 325 TABLET, FILM COATED ORAL at 16:50

## 2018-07-15 RX ADMIN — INSULIN ASPART 1 UNITS: 100 INJECTION, SOLUTION INTRAVENOUS; SUBCUTANEOUS at 17:59

## 2018-07-15 RX ADMIN — LIDOCAINE 3 PATCH: 560 PATCH PERCUTANEOUS; TOPICAL; TRANSDERMAL at 21:42

## 2018-07-15 RX ADMIN — METOPROLOL TARTRATE 75 MG: 25 TABLET, FILM COATED ORAL at 21:40

## 2018-07-15 RX ADMIN — ACETAMINOPHEN 650 MG: 325 TABLET, FILM COATED ORAL at 08:19

## 2018-07-15 RX ADMIN — Medication 3 MG: at 21:43

## 2018-07-15 RX ADMIN — ESCITALOPRAM OXALATE 20 MG: 20 TABLET ORAL at 08:20

## 2018-07-15 RX ADMIN — HEPARIN SODIUM 5000 UNITS: 5000 INJECTION, SOLUTION INTRAVENOUS; SUBCUTANEOUS at 14:09

## 2018-07-15 NOTE — PLAN OF CARE
Problem: Goal/Outcome  Goal: Goal Outcome Summary  OT: During PM session, MoCA administered and pt scored 28/30 indicating WNL for cognition.
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
## 2018-07-15 NOTE — PLAN OF CARE
Problem: Goal/Outcome  Goal: Goal Outcome Summary  FOCUS/GOAL  Medication management, Medical management and Skin integrity    ASSESSMENT, INTERVENTIONS AND CONTINUING PLAN FOR GOAL:  Patient has been sleeping so far this shift. Woken up for medications and to assist patient in getting her CPAP applied. Patient taking meds whole with applesauce. No complaints of pain. Patient has been sleeping reclined in chair next to bed so far.  at 0200. No concerns so far, will continue to monitor.

## 2018-07-15 NOTE — PROGRESS NOTES
"                     Diabetes Consult Daily  Progress Note          Assessment/Plan:                          Mariel Ribeiro is a 73 year old female with a history of type 2 diabetes,obesity, COPD, CAD with history of stent placement, CKD stage III, hyperlipidemia,fibromyalgia, anxiety, depression, lymphedema, S/P CABG on 07/ 02/18.    Glucoses mostly in the mid 100s with correction to meal aspart order yesterday.  Vimal in BG at HS likely due to some missed carb coverage with apple sauce after supper.    PLAN  -NPH 35 units am ( 0900)  -NPH 30 units PM ( 2000)  -aspart for meals and snacks: 1 unit per 5 grams of CHO -- please give aspart if pt has whole container of apple sauce with meds (does not need if just spoonfuls).  -aspart for correction  high intensity ac and hs  -monitor glucose ac, hs, and 0200     At discharge we will need to switch pt from aspart to regular insulin (due to cost).          Outpatient diabetes follow up: Pt and SO would like to consider seeing endocrinologist-  number for [facility] included in AVS.  Plan discussed with patient.                 ADDENDUM: could consider repaglinide (Prandin) as alternative to regular insulin at home-- we could start this while on ARU to titrate dose.  Will discuss with Mariel tomorrow.  .           Interval History:   The last 24 hours progress and nursing notes reviewed.  Mariel reports feeling \"all right\" today. With evening meds she tends to have a whole applesauce (with meds at other times of day she usually just has spoonfuls).  She also had Vitamin Water last night after supper that she noted had 5g CHO.  No aspart for either applesauce or vitamin water- Bg up to 235 at HS.  Otherwise BG in mid 100s.    Creatinine trending higher: 1.57 today.    Will need to transition from Novolog to Regular insulin on discharge from ARU.     PTA:   NPH 35/40  Regular 16 to 18 units breakfast and pre-lunch  Dinner 10-12 units    Recent Labs  Lab " "07/15/18  0617 07/15/18  0613 07/15/18  0216 07/14/18  2236 07/14/18  1828 07/14/18  1233 07/14/18  0722 07/14/18  0700  07/13/18  0507  07/12/18  0612  07/11/18  0611  07/10/18  0603   GLC  --  128*  --   --   --   --   --  156*  --  130*  --  114*  --  142*  --  83   *  --  144* 235* 153* 169* 181*  --   < >  --   < >  --   < >  --   < >  --    < > = values in this interval not displayed.            Review of Systems:   See interval hx          Medications:       Active Diet Order      Combination Diet [id number] Calories: High Consistent CHO (4-7 CHO units/meal); Thin Liquids (water, ice chips, juice, milk, gelatin, ice cream, etc)     Physical Exam:  Gen: Alert, sitting in chair in shower room getting dressed with OT, NAD   Resp: unlabored on RA  Neuro:oriented x3, communicating clearly  /55 (BP Location: Left arm)  Pulse 63  Temp 96.1  F (35.6  C) (Oral)  Resp 16  Ht 1.676 m (5' 6\")  Wt 141.1 kg (311 lb)  SpO2 95%  BMI 50.2 kg/m2           Data:     Lab Results   Component Value Date    A1C 9.3 07/01/2018    A1C 8.2 03/19/2018    A1C 7.3 08/09/2017    A1C 7.2 03/08/2017    A1C 7.3 12/23/2016            Recent Labs   Lab Test  07/15/18   0613  07/14/18   1122   07/14/18   0700   NA  142   --    --   142   POTASSIUM  3.5  3.8   < >  6.0*   CHLORIDE  103   --    --   103   CO2  31   --    --   31   ANIONGAP  8   --    --   8   GLC  128*   --    --   156*   BUN  41*   --    --   36*   CR  1.57*   --    --   1.42*   ANTOINETTE  8.8   --    --   9.0    < > = values in this interval not displayed.     CBC RESULTS:   Recent Labs   Lab Test  07/14/18   0700   WBC  8.9   RBC  2.96*   HGB  8.0*   HCT  27.4*   MCV  93   MCH  27.0   MCHC  29.2*   RDW  16.8*   PLT  248         Kia Hdz PA-C [phone]  Diabetes Management job code [id number]            "

## 2018-07-15 NOTE — PLAN OF CARE
Problem: Goal/Outcome  Goal: Goal Outcome Summary  Outcome: No Change  Alert and oriented x 4. Denied having pain and discomfort. Good appetite and fluid intake. None productive cough. Surgical incision on chest and BLE intact. Changed dressing BLE surgical area and old chest tube site small serosanguinous drainage noted. B/G was 153 dinner time and 235 at bed time gave insulin per s/s and carb counts. PICC line dressing intact and patent.

## 2018-07-15 NOTE — PLAN OF CARE
Problem: Goal/Outcome  Goal: Goal Outcome Summary  Up in room and hallways with WW. Had shower today with OT. Lymphedema wraps rewrapped after shower.  Pain in left shoulder tolerable with scheduled Tylenol and warm T pump to area. Will try Lidoderm patches starting this evening.  Hydralazine held twice today per parameters for DBP less than 60.

## 2018-07-15 NOTE — PLAN OF CARE
Plan of Care review: PT    Problem:  Impaired functional tolerance, pain,  Goal: improved self management of pain and safe mobility on the unit in prep for home.   Recommendations: continue transfers, gait, stairs, and therapeutic exercise to improve tolerance, manage pain as needed for home mobility.     Eric Sanchez, PT  7/15/2018

## 2018-07-15 NOTE — PROGRESS NOTES
[facility] Acute Rehabilitation Unit Progress Note    Patient Name: Mariel Ribeior   YOB: 1945  MRN: [id number]    Age / Sex: 73 year old female    ASSESSMENT/PLAN:  Mariel Ribeiro is a 73 year old lady in acute rehab for cardiac rehabilitation following CABG on 2 Jul 2018. Patient progressing through therapies, with current sustaining cares meeting needs. See primary team note for details.  - EMR reviewed, no acute overnight events, vital stable.  Nursing staff requests parameters for metoprolol orders, modified to have normal pharmacy parameters (hold if pulse <60, etc).  - labs reviewed, BMP shows improved stable K, increasing Cr.  Will have BMP tomorrow as well to ensure not worsening.  still pending iron studies, imaging none new  - continue multidisciplinary therapies and plan of care.  -Pt still having increased left shoulder pain without good relief with T-pump.  Will add lidoderm patches.  Discussed best timing, will try 3 patches qHS to shoulder and low back.  If not effective, switch to apply at AM.    - Wound care consult placed for new wound along sacrum, pt denies new/worsening pain there.    SUBJECTIVE/INTERVAL HX:    Patient was seen and examined at bedside rounds. Reports feeling well, however still having bothersome L shoulder pain. Denies fevers/chills, shortness of breath, chest pain, bowel/bladder changes, or new pain.     CURRENT INTERVENTIONS:  Current Facility-Administered Medications   Medication     acetaminophen (TYLENOL) tablet 325-650 mg     albuterol (PROAIR HFA/PROVENTIL HFA/VENTOLIN HFA) Inhaler 1-2 puff     albuterol neb solution 2.5 mg     artificial saliva (BIOTENE MT) solution 1 spray     aspirin EC tablet 325 mg     atorvastatin (LIPITOR) tablet 40 mg     bumetanide (BUMEX) tablet 2 mg     cholecalciferol (vitamin D3) capsule CAPS 10,000 Units     ciprofloxacin (CIPRO) tablet 250 mg     cyclobenzaprine (FLEXERIL) tablet 10 mg      glucose gel 15-30 g    Or     dextrose 50 % injection 25-50 mL    Or     glucagon injection 1 mg     EPINEPHrine (ADRENALIN) kit 0.3 mg     escitalopram (LEXAPRO) tablet 20 mg     febuxostat (ULORIC) tablet 40 mg     fluticasone-vilanterol (BREO ELLIPTA) 100-25 MCG/INH oral inhaler 1 puff     folic acid (FOLVITE) tablet 800 mcg     guaiFENesin (MUCINEX) 12 hr tablet 600 mg     heparin lock flush 10 UNIT/ML injection 5-10 mL     heparin lock flush 10 UNIT/ML injection 5-10 mL     heparin sodium PF injection 5,000 Units     hydrALAZINE (APRESOLINE) tablet 75 mg     HYDROmorphone (DILAUDID) tablet 2 mg     hypromellose-dextran (ARTIFICAL TEARS) 0.1-0.3 % ophthalmic solution 1 drop     insulin aspart (NovoLOG) inj (RAPID ACTING)     insulin aspart (NovoLOG) inj (RAPID ACTING)     insulin aspart (NovoLOG) inj (RAPID ACTING)     insulin aspart (NovoLOG) inj (RAPID ACTING)     insulin isophane human (HumuLIN N PEN) injection 30 Units     insulin isophane human (HumuLIN N PEN) injection 35 Units     levothyroxine (SYNTHROID/LEVOTHROID) tablet 150 mcg     Lidocaine (LIDOCARE) 4 % Patch 3 patch    And     [START ON 7/16/2018] lidocaine patch REMOVAL    And     lidocaine patch in PLACE     lidocaine (LMX4) kit     lidocaine 1 % 1 mL     melatonin tablet 3 mg     metoprolol tartrate (LOPRESSOR) tablet 75 mg     naloxone (NARCAN) injection 0.1-0.4 mg     niacin (SLO-NIACIN) CR tablet 500 mg     nitroGLYcerin (NITROSTAT) sublingual tablet 0.4 mg     pantoprazole (PROTONIX) EC tablet 40 mg     Patient is already receiving anticoagulation with heparin, enoxaparin (LOVENOX), warfarin (COUMADIN)  or other anticoagulant medication     polyethylene glycol (MIRALAX/GLYCOLAX) Packet 17 g     potassium chloride (KLOR-CON) Packet 20-40 mEq     potassium chloride 10 mEq in 100 mL intermittent infusion with 10 mg lidocaine     potassium chloride 10 mEq in 100 mL sterile water intermittent infusion (premix)     potassium chloride 20 mEq  in 50 mL intermittent infusion     potassium chloride SA (K-DUR/KLOR-CON M) CR tablet 20-40 mEq     pregabalin (LYRICA) capsule 100 mg     senna-docusate (SENOKOT-S;PERICOLACE) 8.6-50 MG per tablet 1-2 tablet     sodium chloride (PF) 0.9% PF flush 10-20 mL     traZODone (DESYREL) tablet 150 mg       PHYSICAL EXAM:  Vitals: Temp: 96.1  F (35.6  C) Temp src: Oral BP: 129/55 Pulse: 63   Resp: 16 SpO2: 95 % O2 Device: None (Room air)    Gen: No acute distress, pleasant  HEENT: hearing present to speaking voice  CVS: nl HR, severe extremity lymphedema noted  Resp: breathing unlabored at rest on room air  MSK: moving all limbs spontaneously  Neuro: AOx3, communicative  Psych: nl affect          Zeeshan Aaron, DO  PGY-2 PM&R Resident  [facility] Acute Rehab  Pager: [phone]    Patient seen by me and discussed with Dr Aaron. I agree with his note, exam, and plan for this patient. >50% time spent face to face and with patient care coordination.    Edgar Singer MD

## 2018-07-16 ENCOUNTER — APPOINTMENT (OUTPATIENT)
Dept: ULTRASOUND IMAGING | Facility: CLINIC | Age: 73
DRG: 949 | End: 2018-07-16
Attending: STUDENT IN AN ORGANIZED HEALTH CARE EDUCATION/TRAINING PROGRAM
Payer: MEDICARE

## 2018-07-16 LAB
ANION GAP SERPL CALCULATED.3IONS-SCNC: 8 MMOL/L (ref 3–14)
BUN SERPL-MCNC: 42 MG/DL (ref 7–30)
CALCIUM SERPL-MCNC: 8.9 MG/DL (ref 8.5–10.1)
CHLORIDE SERPL-SCNC: 101 MMOL/L (ref 94–109)
CO2 SERPL-SCNC: 29 MMOL/L (ref 20–32)
CREAT SERPL-MCNC: 1.53 MG/DL (ref 0.52–1.04)
GFR SERPL CREATININE-BSD FRML MDRD: 33 ML/MIN/1.7M2
GLUCOSE BLDC GLUCOMTR-MCNC: 111 MG/DL (ref 70–99)
GLUCOSE BLDC GLUCOMTR-MCNC: 150 MG/DL (ref 70–99)
GLUCOSE BLDC GLUCOMTR-MCNC: 187 MG/DL (ref 70–99)
GLUCOSE BLDC GLUCOMTR-MCNC: 214 MG/DL (ref 70–99)
GLUCOSE BLDC GLUCOMTR-MCNC: 85 MG/DL (ref 70–99)
GLUCOSE SERPL-MCNC: 171 MG/DL (ref 70–99)
POTASSIUM SERPL-SCNC: 3.7 MMOL/L (ref 3.4–5.3)
SODIUM SERPL-SCNC: 138 MMOL/L (ref 133–144)

## 2018-07-16 PROCEDURE — 25000132 ZZH RX MED GY IP 250 OP 250 PS 637: Mod: GY | Performed by: PHYSICAL MEDICINE & REHABILITATION

## 2018-07-16 PROCEDURE — A9270 NON-COVERED ITEM OR SERVICE: HCPCS | Mod: GY | Performed by: STUDENT IN AN ORGANIZED HEALTH CARE EDUCATION/TRAINING PROGRAM

## 2018-07-16 PROCEDURE — A9270 NON-COVERED ITEM OR SERVICE: HCPCS | Mod: GY | Performed by: PHYSICAL MEDICINE & REHABILITATION

## 2018-07-16 PROCEDURE — 97530 THERAPEUTIC ACTIVITIES: CPT | Mod: GP

## 2018-07-16 PROCEDURE — 12800006 ZZH R&B REHAB

## 2018-07-16 PROCEDURE — 25000132 ZZH RX MED GY IP 250 OP 250 PS 637: Mod: GY | Performed by: GENERAL PRACTICE

## 2018-07-16 PROCEDURE — 25000132 ZZH RX MED GY IP 250 OP 250 PS 637: Mod: GY | Performed by: STUDENT IN AN ORGANIZED HEALTH CARE EDUCATION/TRAINING PROGRAM

## 2018-07-16 PROCEDURE — 97110 THERAPEUTIC EXERCISES: CPT | Mod: GP

## 2018-07-16 PROCEDURE — 40000133 ZZH STATISTIC OT WARD VISIT

## 2018-07-16 PROCEDURE — 40000193 ZZH STATISTIC PT WARD VISIT

## 2018-07-16 PROCEDURE — G0463 HOSPITAL OUTPT CLINIC VISIT: HCPCS

## 2018-07-16 PROCEDURE — 97535 SELF CARE MNGMENT TRAINING: CPT | Mod: GO

## 2018-07-16 PROCEDURE — 80048 BASIC METABOLIC PNL TOTAL CA: CPT | Performed by: GENERAL PRACTICE

## 2018-07-16 PROCEDURE — 93971 EXTREMITY STUDY: CPT | Mod: RT

## 2018-07-16 PROCEDURE — A9270 NON-COVERED ITEM OR SERVICE: HCPCS | Mod: GY | Performed by: GENERAL PRACTICE

## 2018-07-16 PROCEDURE — 25000128 H RX IP 250 OP 636: Performed by: STUDENT IN AN ORGANIZED HEALTH CARE EDUCATION/TRAINING PROGRAM

## 2018-07-16 PROCEDURE — 97140 MANUAL THERAPY 1/> REGIONS: CPT | Mod: GP

## 2018-07-16 PROCEDURE — 00000146 ZZHCL STATISTIC GLUCOSE BY METER IP

## 2018-07-16 RX ORDER — BUMETANIDE 2 MG/1
2 TABLET ORAL DAILY
Status: DISCONTINUED | OUTPATIENT
Start: 2018-07-16 | End: 2018-07-16

## 2018-07-16 RX ORDER — REPAGLINIDE 0.5 MG/1
1 TABLET ORAL
Status: DISCONTINUED | OUTPATIENT
Start: 2018-07-17 | End: 2018-07-23 | Stop reason: HOSPADM

## 2018-07-16 RX ORDER — ACETAMINOPHEN 325 MG/1
975 TABLET ORAL 3 TIMES DAILY
Status: DISCONTINUED | OUTPATIENT
Start: 2018-07-16 | End: 2018-07-23 | Stop reason: HOSPADM

## 2018-07-16 RX ADMIN — CIPROFLOXACIN HYDROCHLORIDE 250 MG: 250 TABLET, FILM COATED ORAL at 09:33

## 2018-07-16 RX ADMIN — ATORVASTATIN CALCIUM 40 MG: 40 TABLET, FILM COATED ORAL at 20:28

## 2018-07-16 RX ADMIN — HEPARIN SODIUM 5000 UNITS: 5000 INJECTION, SOLUTION INTRAVENOUS; SUBCUTANEOUS at 21:50

## 2018-07-16 RX ADMIN — METOPROLOL TARTRATE 75 MG: 25 TABLET, FILM COATED ORAL at 09:32

## 2018-07-16 RX ADMIN — GUAIFENESIN 600 MG: 600 TABLET, EXTENDED RELEASE ORAL at 09:28

## 2018-07-16 RX ADMIN — Medication 3 MG: at 21:50

## 2018-07-16 RX ADMIN — ACETAMINOPHEN 975 MG: 325 TABLET, FILM COATED ORAL at 10:00

## 2018-07-16 RX ADMIN — CIPROFLOXACIN HYDROCHLORIDE 250 MG: 250 TABLET, FILM COATED ORAL at 20:28

## 2018-07-16 RX ADMIN — GUAIFENESIN 600 MG: 600 TABLET, EXTENDED RELEASE ORAL at 21:50

## 2018-07-16 RX ADMIN — FEBUXOSTAT 40 MG: 40 TABLET ORAL at 21:51

## 2018-07-16 RX ADMIN — BUMETANIDE 1.5 MG: 1 TABLET ORAL at 19:11

## 2018-07-16 RX ADMIN — LEVOTHYROXINE SODIUM 150 MCG: 25 TABLET ORAL at 09:30

## 2018-07-16 RX ADMIN — TRAZODONE HYDROCHLORIDE 150 MG: 50 TABLET ORAL at 21:51

## 2018-07-16 RX ADMIN — PANTOPRAZOLE SODIUM 40 MG: 40 TABLET, DELAYED RELEASE ORAL at 06:10

## 2018-07-16 RX ADMIN — ACETAMINOPHEN 975 MG: 325 TABLET, FILM COATED ORAL at 13:31

## 2018-07-16 RX ADMIN — HEPARIN SODIUM 5000 UNITS: 5000 INJECTION, SOLUTION INTRAVENOUS; SUBCUTANEOUS at 06:10

## 2018-07-16 RX ADMIN — ASPIRIN 325 MG: 325 TABLET, DELAYED RELEASE ORAL at 09:30

## 2018-07-16 RX ADMIN — LIDOCAINE 3 PATCH: 560 PATCH PERCUTANEOUS; TOPICAL; TRANSDERMAL at 21:48

## 2018-07-16 RX ADMIN — HYDRALAZINE HYDROCHLORIDE 75 MG: 50 TABLET ORAL at 09:31

## 2018-07-16 RX ADMIN — ACETAMINOPHEN 650 MG: 325 TABLET, FILM COATED ORAL at 01:08

## 2018-07-16 RX ADMIN — ESCITALOPRAM OXALATE 20 MG: 20 TABLET ORAL at 09:28

## 2018-07-16 RX ADMIN — HEPARIN SODIUM 5000 UNITS: 5000 INJECTION, SOLUTION INTRAVENOUS; SUBCUTANEOUS at 12:21

## 2018-07-16 RX ADMIN — NIACIN ER 500 MG TABLET,EXTENDED RELEASE 500 MG: at 21:50

## 2018-07-16 RX ADMIN — INSULIN HUMAN 30 UNITS: 100 INJECTION, SUSPENSION SUBCUTANEOUS at 21:51

## 2018-07-16 RX ADMIN — INSULIN ASPART 2 UNITS: 100 INJECTION, SOLUTION INTRAVENOUS; SUBCUTANEOUS at 09:42

## 2018-07-16 RX ADMIN — INSULIN ASPART 1 UNITS: 100 INJECTION, SOLUTION INTRAVENOUS; SUBCUTANEOUS at 12:19

## 2018-07-16 RX ADMIN — ACETAMINOPHEN 975 MG: 325 TABLET, FILM COATED ORAL at 21:50

## 2018-07-16 RX ADMIN — ACETAMINOPHEN 800 MCG: 400 TABLET ORAL at 09:32

## 2018-07-16 RX ADMIN — NIACIN ER 500 MG TABLET,EXTENDED RELEASE 500 MG: at 09:30

## 2018-07-16 RX ADMIN — METOPROLOL TARTRATE 75 MG: 25 TABLET, FILM COATED ORAL at 21:00

## 2018-07-16 RX ADMIN — FLUTICASONE FUROATE AND VILANTEROL TRIFENATATE 1 PUFF: 100; 25 POWDER RESPIRATORY (INHALATION) at 09:27

## 2018-07-16 RX ADMIN — HYDRALAZINE HYDROCHLORIDE 75 MG: 50 TABLET ORAL at 13:31

## 2018-07-16 RX ADMIN — BUMETANIDE 1.5 MG: 1 TABLET ORAL at 09:27

## 2018-07-16 RX ADMIN — PREGABALIN 100 MG: 100 CAPSULE ORAL at 21:50

## 2018-07-16 RX ADMIN — INSULIN HUMAN 35 UNITS: 100 INJECTION, SUSPENSION SUBCUTANEOUS at 09:41

## 2018-07-16 RX ADMIN — PREGABALIN 100 MG: 100 CAPSULE ORAL at 09:28

## 2018-07-16 RX ADMIN — POLYETHYLENE GLYCOL 3350 17 G: 17 POWDER, FOR SOLUTION ORAL at 09:28

## 2018-07-16 RX ADMIN — CHOLECALCIFEROL CAP 125 MCG (5000 UNIT) 10000 UNITS: 125 CAP at 09:28

## 2018-07-16 NOTE — PLAN OF CARE
FOCUS/GOAL  Bowel management, Bladder management, Pain management, Skin integrity and Cognition/Memory/Judgment/Problem solving    ASSESSMENT, INTERVENTIONS AND CONTINUING PLAN FOR GOAL:  Pt is alert and oriented, denied pain, SOB, and lightheadedness, auditory wheezing present, when offered inhaller pt declined, pt slept without Bipap for majority of the night in recliner by choice after being offered alternatives, no further care concerns at this time continue to monitor breathing.

## 2018-07-16 NOTE — PLAN OF CARE
Problem: Goal/Outcome  Goal: Goal Outcome Summary  FOCUS/GOAL  Bowel management, Bladder management, Pain management, Wound care management, Mobility and Equipment/Assistive devices    ASSESSMENT, INTERVENTIONS AND CONTINUING PLAN FOR GOAL:    Patient is A&Ox4, continent of bowel/bladder. No BM this shift. Patient transfers CGA with a cane. Chest drain sites have scant serosanguinous drainage, cleansed and covered with gauze. Three leg incisions cleaned and covered with Mepilex. Sternal incision is surgically glued and IMANI. Lidocaine patches on left clavicle, left shoulder and lower back for pain. Scheduled Tylenol has helped to relieve the pain also. B, 291. Continue POC.

## 2018-07-16 NOTE — PLAN OF CARE
FOCUS/GOAL  Pain management and Mobility    ASSESSMENT, INTERVENTIONS AND CONTINUING PLAN FOR GOAL:  Alert and oriented, used call light appropriately and able to verbalize her needs. Diastolic BP 59 this morning, reported to MD and he ok'd to give morning dose of hydralazine. C/O chronic pain to left shoulder, she discussed pain management plan/scheduled Tylenol order with MD, new orders noted. Pt also utilized k-pad for pain management, which she reported is helpful.  and 150, insulin administered as ordered. Lymphedema wraps done this morning by PT. WOCN consult done this morning with writer present. When pt stood up for WOCN to look at her coccyx, she became dizzy when leaning forward and needed to sit back down. Moisture barrier applied to gluteal fissure. Sternal precautions maintained. Pt requested different recliner d/t having difficulty with reclining her current chair, NA and HUC notified to see what was available. Will continue to monitor.

## 2018-07-16 NOTE — PROGRESS NOTES
[facility] Nurse Inpatient Skin Assessment     Initial Assessment of:   Gluteal cleft      Data:   Patient History:      per MD note(s):   73 year old female with a past medical history of DM2, HTN, CKD3, HLD, COPD, fibromyalgia, anxiety, depression, lymphedema, thyroid cancer s/p resection, and multivessel CAD who presented with with unstable angina. Patient had recurrent chest pain and her CABG was moved to 2018. On 2018 she underwent a 3 vessel CABG (LIMA to LAD, SVG to R PDA, and SVG to OMA2).       Willie Assessment and sub scores:   Willie Score  Av.8  Min: 10  Max: 21    Positioning: Chair cushion,     Mattress:  Standard , Atmos Air mattress    Moisture Management:  Incontinence Protocol    Catheter secured? Not applicable    Current Diet / Nutrition:           Active Diet Order      Combination Diet 1241-3015 Calories: High Consistent CHO (4-7 CHO units/meal); Thin Liquids (water, ice chips, juice, milk, gelatin, ice cream, etc)    Other     Labs:   Recent Labs   Lab Test  18   0700   18   0904   18   0621   10/04/17   1710   ALBUMIN   --    --    --    --   3.1*   < >   --    HGB  8.0*   < >   --    < >   --    < >  13.4   INR   --    --   1.09   < >   --    < >   --    WBC  8.9   < >   --    < >   --    < >  7.9   A1C   --    --    --    --   9.3*   < >   --    CRP   --    --    --    --    --    --   15.0*    < > = values in this interval not displayed.         Skin Assessment (location):   Gluteal cleft  History:  Urinary incontinence since hospitalized per her report, deep skin folds      Skin: erosion of epidermis,  4.0 x 0.2 x 0.1cm    Color: pink    Temperature  normal     Drainage:  None seen        Pain:  minimal , dull          Intervention:     Patient's chart evaluated.      Cares performed: Ski inspected , barrier cream applied    Orders  Written    Supplies  reviewed    Discussed plan of care with Patient and Nurse           Assessment:      Incontinence associated dermatis to gluteal cleft         Plan:   Nursing to notify the Provider(s) and re-consult the WO Nurse if skin deteriorate(s).    Skin care plan for Gluteal cleft: BID and with each incontinent episode:  cleanse with Perineal cleanser, apply generous amount Criticaide paste    WOC Nurse will return: weekly

## 2018-07-16 NOTE — PLAN OF CARE
"Problem: Patient Care Overview  Goal: Plan of Care/Patient Progress Review  OT: Pt c/o pain on sit<>stand at start of session.  Encouraged pt to sit, rest, try again.  Pt again with pain in standing and attempting to take step.  Pt states \"my bandages are too tight.\"  Upon removal, pt reporting continued pain in L lower leg distal to dressing.  Bruising noted extending distally from dressing, reddened area perpendicular where pt indicates pain.  Skin mildly warm.  Pt states \"I am so worried I have a blood clot.\"  RN inspected, will inform MD.  GCB removed.  -15 RN cares      "

## 2018-07-16 NOTE — PROGRESS NOTES
"                     Diabetes Consult Daily  Progress Note          Assessment/Plan:                          Mariel Ribeiro is a 73 year old female with a history of type 2 diabetes,obesity, COPD, CAD with history of stent placement, CKD stage III, hyperlipidemia,fibromyalgia, anxiety, depression, lymphedema, S/P CABG on 07/ 02/18.    Glucoses mostly in the low to mid 100s, except when she misses aspart for snacks (then up to 200s).    PLAN  -NPH 35 units am ( 0900)  -NPH 30 units PM ( 2000)  -aspart for meals and snacks: 1 unit per 5 grams of CHO -- please give aspart if pt has whole container of apple sauce with meds (does not need if just spoonfuls).-- aspart with meals and snacks to discontinue after today.  -repaglinide 1mg TID with meals to start tomorrow with bfast.  -aspart for correction  high intensity ac and hs  -monitor glucose ac, hs, and 0200                Outpatient diabetes follow up: Pt and SO would like to consider seeing endocrinologist-  number for [facility] included in AVS.  Plan discussed with patient and bedside RN.  .           Interval History:   The last 24 hours progress and nursing notes reviewed.  Mariel continues to have a sore shoulder, no other complaints today.  Appetite is \"ok\".  BG trending high yesterday evening, even more than the night prior.  On reflection she recalled that she had some ice cream yesterday and missed carb coverage.    Checked pricing on repaglinide, as an option to replace Novolog (since pt cannot afford Novolog outpatient and regular insulin can be difficult to dose and has greater potential risk for hypoglycemia).  [facility] price quote for out of pocket: $60 for 2mg TID x 30 days, $108 for 4mg TID x 30 days.  Online price shown for [facility] (her preferred pharmacy) $38 for 2mg TID x 30d with coupon.  I reviewed pricing with Mariel.  She is \"willing to try it and see if it works\".    Creatinine stable at 1.53 today.    Will need to transition " "from Novolog to Regular insulin on discharge from ARU.     PTA:   NPH 35/40  Regular 16 to 18 units breakfast and pre-lunch  Dinner 10-12 units    Recent Labs  Lab 07/16/18  1210 07/16/18  0707 07/16/18  0606 07/16/18  0156 07/15/18  2111 07/15/18  1733 07/15/18  1224  07/15/18  0613  07/14/18  0700  07/13/18  0507  07/12/18  0612  07/11/18  0611   GLC  --   --  171*  --   --   --   --   --  128*  --  156*  --  130*  --  114*  --  142*   * 187*  --  214* 261* 151* 122*  < >  --   < >  --   < >  --   < >  --   < >  --    < > = values in this interval not displayed.            Review of Systems:   See interval hx          Medications:       Active Diet Order      Combination Diet 5160-4816 Calories: High Consistent CHO (4-7 CHO units/meal); Thin Liquids (water, ice chips, juice, milk, gelatin, ice cream, etc)     Physical Exam:  Gen: Alert, sitting on couch, NAD.  Resp: unlabored on RA  Neuro:oriented x3, communicating clearly  Psych: flat affect.  /51 (BP Location: Left arm)  Pulse 83  Temp 97.4  F (36.3  C) (Oral)  Resp 16  Ht 1.676 m (5' 6\")  Wt 139.2 kg (306 lb 14.4 oz)  SpO2 95%  BMI 49.53 kg/m2           Data:     Lab Results   Component Value Date    A1C 9.3 07/01/2018    A1C 8.2 03/19/2018    A1C 7.3 08/09/2017    A1C 7.2 03/08/2017    A1C 7.3 12/23/2016            Recent Labs   Lab Test  07/16/18   0606  07/15/18   0613   NA  138  142   POTASSIUM  3.7  3.5   CHLORIDE  101  103   CO2  29  31   ANIONGAP  8  8   GLC  171*  128*   BUN  42*  41*   CR  1.53*  1.57*   ANTOINETTE  8.9  8.8     CBC RESULTS:   Recent Labs   Lab Test  07/14/18   0700   WBC  8.9   RBC  2.96*   HGB  8.0*   HCT  27.4*   MCV  93   MCH  27.0   MCHC  29.2*   RDW  16.8*   PLT  248       Kia Hdz PA-C [phone]  Diabetes Management job code [id number]            "

## 2018-07-16 NOTE — PROGRESS NOTES
[facility] Acute Rehabilitation Unit Progress Note    Patient Name: Mariel Ribeiro   YOB: 1945  MRN: [id number]     Age / Sex: 73 year old female    SUBJECTIVE/INTERVAL HX:    Patient was seen and examined at bedside. Reports not sleeping well overnight as she is awakened throughout. She thinks therapies have been going well, but has noticed that she feels fatigued with more demanding therapies. CGA with transfers and mobility. BG has been hard to control but endocrine on board. Will continue high dose correction with CHO counting and long acting insulin for now. Will encourage PO fluids with max 2 - 2.5 L daily and reduced bumex as Cr shown on labs. Some dysuria, already on cipro and cultures sensitive to cipro. Continue to monitor.    CURRENT MEDS:  Current Facility-Administered Medications   Medication     acetaminophen (TYLENOL) tablet 975 mg     albuterol (PROAIR HFA/PROVENTIL HFA/VENTOLIN HFA) Inhaler 1-2 puff     albuterol neb solution 2.5 mg     artificial saliva (BIOTENE MT) solution 1 spray     aspirin EC tablet 325 mg     atorvastatin (LIPITOR) tablet 40 mg     bumetanide (BUMEX) tablet 1.5 mg     cholecalciferol (vitamin D3) capsule CAPS 10,000 Units     ciprofloxacin (CIPRO) tablet 250 mg     cyclobenzaprine (FLEXERIL) tablet 10 mg     glucose gel 15-30 g    Or     dextrose 50 % injection 25-50 mL    Or     glucagon injection 1 mg     EPINEPHrine (ADRENALIN) kit 0.3 mg     escitalopram (LEXAPRO) tablet 20 mg     febuxostat (ULORIC) tablet 40 mg     fluticasone-vilanterol (BREO ELLIPTA) 100-25 MCG/INH oral inhaler 1 puff     folic acid (FOLVITE) tablet 800 mcg     guaiFENesin (MUCINEX) 12 hr tablet 600 mg     heparin sodium PF injection 5,000 Units     hydrALAZINE (APRESOLINE) tablet 75 mg     HYDROmorphone (DILAUDID) tablet 2 mg     hypromellose-dextran (ARTIFICAL TEARS) 0.1-0.3 % ophthalmic solution 1 drop     insulin aspart (NovoLOG) inj (RAPID  ACTING)     insulin aspart (NovoLOG) inj (RAPID ACTING)     insulin aspart (NovoLOG) inj (RAPID ACTING)     insulin aspart (NovoLOG) inj (RAPID ACTING)     insulin isophane human (HumuLIN N PEN) injection 30 Units     insulin isophane human (HumuLIN N PEN) injection 35 Units     levothyroxine (SYNTHROID/LEVOTHROID) tablet 150 mcg     Lidocaine (LIDOCARE) 4 % Patch 3 patch    And     lidocaine patch REMOVAL    And     lidocaine patch in PLACE     lidocaine (LMX4) kit     lidocaine 1 % 1 mL     melatonin tablet 3 mg     metoprolol tartrate (LOPRESSOR) tablet 75 mg     naloxone (NARCAN) injection 0.1-0.4 mg     niacin (SLO-NIACIN) CR tablet 500 mg     nitroGLYcerin (NITROSTAT) sublingual tablet 0.4 mg     pantoprazole (PROTONIX) EC tablet 40 mg     Patient is already receiving anticoagulation with heparin, enoxaparin (LOVENOX), warfarin (COUMADIN)  or other anticoagulant medication     polyethylene glycol (MIRALAX/GLYCOLAX) Packet 17 g     potassium chloride (KLOR-CON) Packet 20-40 mEq     potassium chloride 10 mEq in 100 mL intermittent infusion with 10 mg lidocaine     potassium chloride 10 mEq in 100 mL sterile water intermittent infusion (premix)     potassium chloride 20 mEq in 50 mL intermittent infusion     potassium chloride SA (K-DUR/KLOR-CON M) CR tablet 20-40 mEq     pregabalin (LYRICA) capsule 100 mg     senna-docusate (SENOKOT-S;PERICOLACE) 8.6-50 MG per tablet 1-2 tablet     sodium chloride (PF) 0.9% PF flush 10 mL     sodium chloride (PF) 0.9% PF flush 10-20 mL     traZODone (DESYREL) tablet 150 mg       PHYSICAL EXAM:  Vitals: B/P: 164/54, T: 97.7, P: 90, R: 16  Gen: well appearing, no acute distress  HEENT: PERRLA, EOMI, normal conjunctivae, moist mucosae, normal speech  Resp: Distant breath sounds. No wheezes, rales or rhonchi heard.  CVS: RRR, no murmurs rubs gallops. Mild chest pain on palpation.  GI: S NT ND normoactive bowel sounds. Obese.  MSK: Spontaneously moving all four limbs. Left shoulder  limited ROM and with pain on ROM. Pain localizes close to AC joint.  Neuro: AOx3. CN intact. DTRs WNL. Neg clonus bilaterally.   Ext: extremity pulses 2+, skin warm and well-perfused, sternotomoy and graft sites c/d/i. R lower leg below graft bandage appears red and some bruising that is tender.     LABS:  CBC RESULTS:   Recent Labs   Lab Test  07/14/18   0700   WBC  8.9   RBC  2.96*   HGB  8.0*   HCT  27.4*   MCV  93   MCH  27.0   MCHC  29.2*   RDW  16.8*   PLT  248     Last Basic Metabolic Panel:  Lab Results   Component Value Date     07/15/2018      Lab Results   Component Value Date    POTASSIUM 3.5 07/15/2018     Lab Results   Component Value Date    CHLORIDE 103 07/15/2018     Lab Results   Component Value Date    ANTOINETTE 8.8 07/15/2018     Lab Results   Component Value Date    CO2 31 07/15/2018     Lab Results   Component Value Date    BUN 41 07/15/2018     Lab Results   Component Value Date    CR 1.57 07/15/2018     Lab Results   Component Value Date     07/15/2018       ASSESSMENT:  Mariel Ribeiro is a 73 year old female with a past medical history of DM2, HTN, CKD3, HLD, COPD, fibromyalgia, anxiety, depression, lymphedema, thyroid cancer s/p resection, and multivessel CAD who presented with with unstable angina. Patient had recurrent chest pain and her CABG was moved to 7/2/2018. On 7/2/2018 she underwent a 3 vessel CABG (LIMA to LAD, SVG to R PDA, and SVG to OMA2).     Rehabilitation - continue comprehensive acute inpatient rehabilitation program with multidisciplinary approach including therapies, rehab nursing, and physiatry following. See interval history for updates. Patient participating well.   Medical Conditions  #Coronary artery disease - s/p CABG x3  Patient with significant history of CAD in self and family. She has already received 2 stents in 2007. She is presenting with impairment in functional mobility and tasks. 3 vessel CABG performed to LAD, PDA and OMA2.  -Continue sternal  precautions.  -Continue metoprolol 75 BID.  -Continue hydralazine 75 TID.  -Bumex 1.5 mg BID.  - mg daily.  -Atorvastatin 40 mg daily.  -Tylenol 975 mg TID  -NTG PRN for angina.  -Hydromorphone 2 mg q8hrs PO PRN. Allergic to oxycodone.  -Baseline Hgb 12, now 8. Will consider starting iron in light of labs.     #Left shoulder pain  Likely acute on chronic as she had rotator cuff repair in the distant past on this shoulder. Also, could be worsened from surgical positioning and referred pain.   -Continue APAP, lidoderm and light ROM exercises.  -X-ray showed severe OA in glenohumeral joint with some widening in AC. Consider outpatient orthopedics consultation.     #Chronic diastolic heart failure  Patient required IV diuresis in the acute hospital, now receiving PO diuresis with bumex.  -Continue Bumex 1.5 mg BID.  -Maintain good BP control (metoprolol and hydralazine ordered). Likely will go home with ACE-I (stopped in hospital due to increased Cr).  -EF of 55% intraoperatively.     #COPD  -Albuterol nebs and inhaler PRN for severe and mild/moderate SOB, respectively.  -Fluticasone-vilanterol 1 puff daily.  -O2 PRN via NC. Sats well on RA now.  -CPAP at night as per RT.     #Hypothyroidism  Distant history of thyroid resection for thyroid cancer (in 1990s) with resultant hypothyroidism. Continue levothyroxine.  -Levothyroxine 150 mcg daily.     #Hypertension  -Continue metoprolol 75 BID.  -Continue hydralazine 75 TID.  -Bumex 1.5 mg BID.  -Likely will start ACE-I prior to discharge if Cr at baseline. Cr seem slightly above baseline 1.3-1.4.     #Dyslipidemia  -Atorvastatin 40 mg daily.  -Niacin 500 mg BID.     #Type II diabetes mellitus  Patient with hard to control diabetes both inpatient and outpatient. A1C over 9 PTA. Endocrine consulted and we appreciate recs.  -Continue QID fingersticks.  -High correction dose scale ACTID and QHS.  -Scheduled insulin ACTID (1 unit for every 5 g CHO).  -Insulin isophane  human 35 units in the AM and 30 units at PM.  -Consistent CHO diet.  -Lyrica 100 mg BID for neuropathy (and fibromyalgia).     #Chronic kidney disease stage III  -Will check BMP in the AM tomorrow. Cr 1.53 today. Baseline (Cr 1.3-1.4).  -Good BP control as above and good glycemic control as above.  -Reduced bumex to 3 mg daily from 4 mg daily.  -PO fluids max 2 - 2.5 L.     #Urinary tract infection, Enterobacter cloacae complex and E.coli  -Continue ciprofloxacin 250 mg BID. Cultures sensitive. Still has dysuria. Will follow. Last day 7/20.     #Paroxysmal SVT  -Continue metoprolol 75 BID.  -Obtain EKG if symptomatic.     #Gout, hyperuricemia  -Continue febuxostat 40 mg QHS     #Lymphedema  Patient reports this is worse since surgery. This started in 2007 after her two PCIs for CAD. Lymphedema being managed and appears stable today from admission.  -Some redness and bruising noted below graft site wound 7/16. Will continue to monitor.     Adjustment to disability:  Clinical psychology as needed. Continue lexapro 20 mg daily and trazodone 150 mg QHS. Melatonin at night.  FEN: Consistent CHO, thin liquids. Trend Cr. BMP tomorrow AM. Potassium supplement. PICC in right arm (could be pulled if labs stable).  Bowel: Assist with transfer, but functioning. PRN senna ordered. PEG daily.  Bladder: WFL. UTI on cipro. Last day 7/20.  DVT prophylaxis: Heparin 5000 units TID  GI prophylaxis: Pantoprazole  Code: Full code  Disposition: Likely home with outpatient cardiopulmonary rehab services.  Estimated length of stay:  7-10 days.  Rehab prognosis:  Fair  Follow up appointments on discharge: 1) CT surgery 7/20 2) PCP within 2 weeks of d/c.    Rafi Crawford MD  PGY-2 PM&R  Pager: [phone]

## 2018-07-17 ENCOUNTER — PATIENT OUTREACH (OUTPATIENT)
Dept: CARE COORDINATION | Facility: CLINIC | Age: 73
End: 2018-07-17

## 2018-07-17 DIAGNOSIS — E78.5 HYPERLIPIDEMIA WITH TARGET LDL LESS THAN 70: ICD-10-CM

## 2018-07-17 DIAGNOSIS — G47.00 INSOMNIA, UNSPECIFIED TYPE: ICD-10-CM

## 2018-07-17 LAB
ANION GAP SERPL CALCULATED.3IONS-SCNC: 8 MMOL/L (ref 3–14)
BASOPHILS # BLD AUTO: 0 10E9/L (ref 0–0.2)
BASOPHILS NFR BLD AUTO: 0.4 %
BUN SERPL-MCNC: 46 MG/DL (ref 7–30)
CALCIUM SERPL-MCNC: 9 MG/DL (ref 8.5–10.1)
CHLORIDE SERPL-SCNC: 104 MMOL/L (ref 94–109)
CO2 SERPL-SCNC: 27 MMOL/L (ref 20–32)
CREAT SERPL-MCNC: 1.71 MG/DL (ref 0.52–1.04)
CRP SERPL-MCNC: 60.3 MG/L (ref 0–8)
DIFFERENTIAL METHOD BLD: ABNORMAL
EOSINOPHIL # BLD AUTO: 0.3 10E9/L (ref 0–0.7)
EOSINOPHIL NFR BLD AUTO: 2.7 %
ERYTHROCYTE [DISTWIDTH] IN BLOOD BY AUTOMATED COUNT: 15.4 % (ref 10–15)
GFR SERPL CREATININE-BSD FRML MDRD: 29 ML/MIN/1.7M2
GLUCOSE BLDC GLUCOMTR-MCNC: 105 MG/DL (ref 70–99)
GLUCOSE BLDC GLUCOMTR-MCNC: 106 MG/DL (ref 70–99)
GLUCOSE BLDC GLUCOMTR-MCNC: 139 MG/DL (ref 70–99)
GLUCOSE BLDC GLUCOMTR-MCNC: 169 MG/DL (ref 70–99)
GLUCOSE BLDC GLUCOMTR-MCNC: 219 MG/DL (ref 70–99)
GLUCOSE SERPL-MCNC: 99 MG/DL (ref 70–99)
HCT VFR BLD AUTO: 28.8 % (ref 35–47)
HGB BLD-MCNC: 8.6 G/DL (ref 11.7–15.7)
IMM GRANULOCYTES # BLD: 0 10E9/L (ref 0–0.4)
IMM GRANULOCYTES NFR BLD: 0.2 %
LYMPHOCYTES # BLD AUTO: 2 10E9/L (ref 0.8–5.3)
LYMPHOCYTES NFR BLD AUTO: 18.4 %
MCH RBC QN AUTO: 26.6 PG (ref 26.5–33)
MCHC RBC AUTO-ENTMCNC: 29.9 G/DL (ref 31.5–36.5)
MCV RBC AUTO: 89 FL (ref 78–100)
MONOCYTES # BLD AUTO: 0.6 10E9/L (ref 0–1.3)
MONOCYTES NFR BLD AUTO: 5.3 %
NEUTROPHILS # BLD AUTO: 7.9 10E9/L (ref 1.6–8.3)
NEUTROPHILS NFR BLD AUTO: 73 %
NRBC # BLD AUTO: 0 10*3/UL
NRBC BLD AUTO-RTO: 0 /100
PLATELET # BLD AUTO: 235 10E9/L (ref 150–450)
POTASSIUM SERPL-SCNC: 3.2 MMOL/L (ref 3.4–5.3)
POTASSIUM SERPL-SCNC: 4.1 MMOL/L (ref 3.4–5.3)
RBC # BLD AUTO: 3.23 10E12/L (ref 3.8–5.2)
SODIUM SERPL-SCNC: 139 MMOL/L (ref 133–144)
WBC # BLD AUTO: 10.8 10E9/L (ref 4–11)

## 2018-07-17 PROCEDURE — 85025 COMPLETE CBC W/AUTO DIFF WBC: CPT | Performed by: STUDENT IN AN ORGANIZED HEALTH CARE EDUCATION/TRAINING PROGRAM

## 2018-07-17 PROCEDURE — A9270 NON-COVERED ITEM OR SERVICE: HCPCS | Mod: GY | Performed by: PHYSICIAN ASSISTANT

## 2018-07-17 PROCEDURE — 12800006 ZZH R&B REHAB

## 2018-07-17 PROCEDURE — 25000132 ZZH RX MED GY IP 250 OP 250 PS 637: Mod: GY | Performed by: STUDENT IN AN ORGANIZED HEALTH CARE EDUCATION/TRAINING PROGRAM

## 2018-07-17 PROCEDURE — 25000132 ZZH RX MED GY IP 250 OP 250 PS 637: Mod: GY | Performed by: PHYSICAL MEDICINE & REHABILITATION

## 2018-07-17 PROCEDURE — 25000125 ZZHC RX 250: Performed by: STUDENT IN AN ORGANIZED HEALTH CARE EDUCATION/TRAINING PROGRAM

## 2018-07-17 PROCEDURE — 97150 GROUP THERAPEUTIC PROCEDURES: CPT | Mod: GO

## 2018-07-17 PROCEDURE — 40000193 ZZH STATISTIC PT WARD VISIT

## 2018-07-17 PROCEDURE — 36592 COLLECT BLOOD FROM PICC: CPT | Performed by: PHYSICAL MEDICINE & REHABILITATION

## 2018-07-17 PROCEDURE — 97150 GROUP THERAPEUTIC PROCEDURES: CPT | Mod: GP

## 2018-07-17 PROCEDURE — A9270 NON-COVERED ITEM OR SERVICE: HCPCS | Mod: GY | Performed by: PHYSICAL MEDICINE & REHABILITATION

## 2018-07-17 PROCEDURE — 80048 BASIC METABOLIC PNL TOTAL CA: CPT | Performed by: STUDENT IN AN ORGANIZED HEALTH CARE EDUCATION/TRAINING PROGRAM

## 2018-07-17 PROCEDURE — 84132 ASSAY OF SERUM POTASSIUM: CPT | Performed by: PHYSICAL MEDICINE & REHABILITATION

## 2018-07-17 PROCEDURE — 40000133 ZZH STATISTIC OT WARD VISIT: Performed by: OCCUPATIONAL THERAPIST

## 2018-07-17 PROCEDURE — 36592 COLLECT BLOOD FROM PICC: CPT | Performed by: STUDENT IN AN ORGANIZED HEALTH CARE EDUCATION/TRAINING PROGRAM

## 2018-07-17 PROCEDURE — A9270 NON-COVERED ITEM OR SERVICE: HCPCS | Mod: GY | Performed by: STUDENT IN AN ORGANIZED HEALTH CARE EDUCATION/TRAINING PROGRAM

## 2018-07-17 PROCEDURE — 25000132 ZZH RX MED GY IP 250 OP 250 PS 637: Mod: GY | Performed by: PHYSICIAN ASSISTANT

## 2018-07-17 PROCEDURE — 97535 SELF CARE MNGMENT TRAINING: CPT | Mod: GO | Performed by: OCCUPATIONAL THERAPIST

## 2018-07-17 PROCEDURE — 40000133 ZZH STATISTIC OT WARD VISIT

## 2018-07-17 PROCEDURE — A9270 NON-COVERED ITEM OR SERVICE: HCPCS | Mod: GY | Performed by: GENERAL PRACTICE

## 2018-07-17 PROCEDURE — 25000132 ZZH RX MED GY IP 250 OP 250 PS 637: Mod: GY | Performed by: GENERAL PRACTICE

## 2018-07-17 PROCEDURE — 86140 C-REACTIVE PROTEIN: CPT | Performed by: STUDENT IN AN ORGANIZED HEALTH CARE EDUCATION/TRAINING PROGRAM

## 2018-07-17 PROCEDURE — 00000146 ZZHCL STATISTIC GLUCOSE BY METER IP

## 2018-07-17 PROCEDURE — 25000128 H RX IP 250 OP 636: Performed by: STUDENT IN AN ORGANIZED HEALTH CARE EDUCATION/TRAINING PROGRAM

## 2018-07-17 RX ORDER — BUMETANIDE 0.25 MG/ML
3 INJECTION INTRAMUSCULAR; INTRAVENOUS
Status: DISCONTINUED | OUTPATIENT
Start: 2018-07-17 | End: 2018-07-20

## 2018-07-17 RX ADMIN — CIPROFLOXACIN HYDROCHLORIDE 250 MG: 250 TABLET, FILM COATED ORAL at 21:13

## 2018-07-17 RX ADMIN — METOPROLOL TARTRATE 75 MG: 25 TABLET, FILM COATED ORAL at 21:13

## 2018-07-17 RX ADMIN — Medication: at 21:38

## 2018-07-17 RX ADMIN — POTASSIUM CHLORIDE 40 MEQ: 750 TABLET, FILM COATED, EXTENDED RELEASE ORAL at 08:38

## 2018-07-17 RX ADMIN — LEVOTHYROXINE SODIUM 150 MCG: 25 TABLET ORAL at 08:27

## 2018-07-17 RX ADMIN — NIACIN ER 500 MG TABLET,EXTENDED RELEASE 500 MG: at 08:26

## 2018-07-17 RX ADMIN — INSULIN ASPART 2 UNITS: 100 INJECTION, SOLUTION INTRAVENOUS; SUBCUTANEOUS at 12:57

## 2018-07-17 RX ADMIN — BUMETANIDE 3 MG: 0.25 INJECTION INTRAMUSCULAR; INTRAVENOUS at 17:27

## 2018-07-17 RX ADMIN — METOPROLOL TARTRATE 75 MG: 25 TABLET, FILM COATED ORAL at 08:27

## 2018-07-17 RX ADMIN — ATORVASTATIN CALCIUM 40 MG: 40 TABLET, FILM COATED ORAL at 21:14

## 2018-07-17 RX ADMIN — HEPARIN SODIUM 5000 UNITS: 5000 INJECTION, SOLUTION INTRAVENOUS; SUBCUTANEOUS at 12:56

## 2018-07-17 RX ADMIN — LIDOCAINE 3 PATCH: 560 PATCH PERCUTANEOUS; TOPICAL; TRANSDERMAL at 21:25

## 2018-07-17 RX ADMIN — Medication 3 MG: at 21:14

## 2018-07-17 RX ADMIN — ASPIRIN 325 MG: 325 TABLET, DELAYED RELEASE ORAL at 08:26

## 2018-07-17 RX ADMIN — BUMETANIDE 1.5 MG: 1 TABLET ORAL at 08:26

## 2018-07-17 RX ADMIN — GUAIFENESIN 600 MG: 600 TABLET, EXTENDED RELEASE ORAL at 08:28

## 2018-07-17 RX ADMIN — CHOLECALCIFEROL CAP 125 MCG (5000 UNIT) 10000 UNITS: 125 CAP at 08:26

## 2018-07-17 RX ADMIN — ACETAMINOPHEN 975 MG: 325 TABLET, FILM COATED ORAL at 21:13

## 2018-07-17 RX ADMIN — FEBUXOSTAT 40 MG: 40 TABLET ORAL at 21:13

## 2018-07-17 RX ADMIN — HYDRALAZINE HYDROCHLORIDE 75 MG: 50 TABLET ORAL at 08:24

## 2018-07-17 RX ADMIN — HEPARIN SODIUM 5000 UNITS: 5000 INJECTION, SOLUTION INTRAVENOUS; SUBCUTANEOUS at 21:14

## 2018-07-17 RX ADMIN — HEPARIN SODIUM 5000 UNITS: 5000 INJECTION, SOLUTION INTRAVENOUS; SUBCUTANEOUS at 06:06

## 2018-07-17 RX ADMIN — INSULIN HUMAN 30 UNITS: 100 INJECTION, SUSPENSION SUBCUTANEOUS at 21:18

## 2018-07-17 RX ADMIN — PREGABALIN 100 MG: 100 CAPSULE ORAL at 21:14

## 2018-07-17 RX ADMIN — POTASSIUM CHLORIDE 20 MEQ: 750 TABLET, FILM COATED, EXTENDED RELEASE ORAL at 12:04

## 2018-07-17 RX ADMIN — HYDRALAZINE HYDROCHLORIDE 75 MG: 50 TABLET ORAL at 21:12

## 2018-07-17 RX ADMIN — INSULIN ASPART 1 UNITS: 100 INJECTION, SOLUTION INTRAVENOUS; SUBCUTANEOUS at 21:17

## 2018-07-17 RX ADMIN — REPAGLINIDE 1 MG: 1 TABLET ORAL at 12:56

## 2018-07-17 RX ADMIN — ACETAMINOPHEN 975 MG: 325 TABLET, FILM COATED ORAL at 13:00

## 2018-07-17 RX ADMIN — INSULIN HUMAN 35 UNITS: 100 INJECTION, SUSPENSION SUBCUTANEOUS at 08:31

## 2018-07-17 RX ADMIN — GUAIFENESIN 600 MG: 600 TABLET, EXTENDED RELEASE ORAL at 21:14

## 2018-07-17 RX ADMIN — ACETAMINOPHEN 800 MCG: 400 TABLET ORAL at 08:26

## 2018-07-17 RX ADMIN — FLUTICASONE FUROATE AND VILANTEROL TRIFENATATE 1 PUFF: 100; 25 POWDER RESPIRATORY (INHALATION) at 08:29

## 2018-07-17 RX ADMIN — TRAZODONE HYDROCHLORIDE 150 MG: 50 TABLET ORAL at 21:14

## 2018-07-17 RX ADMIN — PREGABALIN 100 MG: 100 CAPSULE ORAL at 08:39

## 2018-07-17 RX ADMIN — ESCITALOPRAM OXALATE 20 MG: 20 TABLET ORAL at 08:27

## 2018-07-17 RX ADMIN — PANTOPRAZOLE SODIUM 40 MG: 40 TABLET, DELAYED RELEASE ORAL at 06:07

## 2018-07-17 RX ADMIN — ACETAMINOPHEN 975 MG: 325 TABLET, FILM COATED ORAL at 06:07

## 2018-07-17 RX ADMIN — NIACIN ER 500 MG TABLET,EXTENDED RELEASE 500 MG: at 21:13

## 2018-07-17 RX ADMIN — CIPROFLOXACIN HYDROCHLORIDE 250 MG: 250 TABLET, FILM COATED ORAL at 08:26

## 2018-07-17 RX ADMIN — POLYETHYLENE GLYCOL 3350 17 G: 17 POWDER, FOR SOLUTION ORAL at 08:25
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
## 2018-07-17 NOTE — PROGRESS NOTES
Clinic Care Coordination Contact--  Received CTS today 7/17/2018    Care Coordination Transition Communication    Referral Source: IP Handoff    Clinical Data: Patient was hospitalized at [facility]  from 6/29 to 7/13 with diagnosis of   Hypokalemia Acute Chest pain      Transition to Facility:              Facility Name: Acute Rehabilitation              Contact name and phone number/fax: Liudmila Dotson   [phone]    Plan: RN/SW Care Coordinator will await notification from facility staff informing RN/SW Care Coordinator of patient's discharge plans/needs. RN/SW Care Coordinator will review chart and outreach to facility staff every 4 weeks and as needed.     Senia Durán RN / Clinical Care Coordinator     [facility]   [email or web address]  Office :  [phone] / Fax :  [phone]

## 2018-07-17 NOTE — PROGRESS NOTES
[facility] Acute Rehabilitation Unit Progress Note    Patient Name: Mariel Ribeiro   YOB: 1945  MRN: [id number]     Age / Sex: 73 year old female    SUBJECTIVE/INTERVAL HX:    Patient was seen and examined at bedside. Reports continued right leg pain near graft site with increased redness and warmth. Cr elevated to 1.7 and medicine team consulted for Bumex and possible SPENSER management. Likely will start bumex IV at night and in the morning and re-assess tomorrow. Discussed right leg with team, will continue to monitor. WBC count and CRP uptrending. Prandin TID started today in place of CHO counting.    CURRENT MEDS:  Current Facility-Administered Medications   Medication     acetaminophen (TYLENOL) tablet 975 mg     albuterol (PROAIR HFA/PROVENTIL HFA/VENTOLIN HFA) Inhaler 1-2 puff     albuterol neb solution 2.5 mg     artificial saliva (BIOTENE MT) solution 1 spray     aspirin EC tablet 325 mg     atorvastatin (LIPITOR) tablet 40 mg     bumetanide (BUMEX) tablet 1.5 mg     cholecalciferol (vitamin D3) capsule CAPS 10,000 Units     ciprofloxacin (CIPRO) tablet 250 mg     cyclobenzaprine (FLEXERIL) tablet 10 mg     glucose gel 15-30 g    Or     dextrose 50 % injection 25-50 mL    Or     glucagon injection 1 mg     EPINEPHrine (ADRENALIN) kit 0.3 mg     escitalopram (LEXAPRO) tablet 20 mg     febuxostat (ULORIC) tablet 40 mg     fluticasone-vilanterol (BREO ELLIPTA) 100-25 MCG/INH oral inhaler 1 puff     folic acid (FOLVITE) tablet 800 mcg     guaiFENesin (MUCINEX) 12 hr tablet 600 mg     heparin sodium PF injection 5,000 Units     hydrALAZINE (APRESOLINE) tablet 75 mg     HYDROmorphone (DILAUDID) tablet 2 mg     insulin aspart (NovoLOG) inj (RAPID ACTING)     insulin aspart (NovoLOG) inj (RAPID ACTING)     insulin isophane human (HumuLIN N PEN) injection 30 Units     insulin isophane human (HumuLIN N PEN) injection 35 Units     levothyroxine (SYNTHROID/LEVOTHROID)  tablet 150 mcg     Lidocaine (LIDOCARE) 4 % Patch 3 patch    And     lidocaine patch REMOVAL    And     lidocaine patch in PLACE     lidocaine (LMX4) cream     lidocaine 1 % 1 mL     melatonin tablet 3 mg     metoprolol tartrate (LOPRESSOR) tablet 75 mg     naloxone (NARCAN) injection 0.1-0.4 mg     niacin (SLO-NIACIN) CR tablet 500 mg     nitroGLYcerin (NITROSTAT) sublingual tablet 0.4 mg     pantoprazole (PROTONIX) EC tablet 40 mg     Patient is already receiving anticoagulation with heparin, enoxaparin (LOVENOX), warfarin (COUMADIN)  or other anticoagulant medication     polyethylene glycol (MIRALAX/GLYCOLAX) Packet 17 g     potassium chloride (KLOR-CON) Packet 20-40 mEq     potassium chloride 10 mEq in 100 mL intermittent infusion with 10 mg lidocaine     potassium chloride 10 mEq in 100 mL sterile water intermittent infusion (premix)     potassium chloride 20 mEq in 50 mL intermittent infusion     potassium chloride SA (K-DUR/KLOR-CON M) CR tablet 20-40 mEq     pregabalin (LYRICA) capsule 100 mg     repaglinide (PRANDIN) tablet 1 mg     senna-docusate (SENOKOT-S;PERICOLACE) 8.6-50 MG per tablet 1-2 tablet     sodium chloride (PF) 0.9% PF flush 10 mL     sodium chloride (PF) 0.9% PF flush 10-20 mL     traZODone (DESYREL) tablet 150 mg       PHYSICAL EXAM:  Vitals: B/P: 132/46, T: 97.4, P: 83, R: 16  Gen: well appearing, no acute distress  HEENT: PERRLA, EOMI, normal conjunctivae, moist mucosae, normal speech  Resp: Distant breath sounds. No wheezes, rales or rhonchi heard.  CVS: RRR, no murmurs rubs gallops. Mild chest pain on palpation.  GI: S NT ND normoactive bowel sounds. Obese.  MSK: Spontaneously moving all four limbs. Left shoulder limited ROM and with pain on ROM. Pain localizes close to AC joint.  Neuro: AOx3. CN intact. DTRs WNL. Neg clonus bilaterally.   Ext: extremity pulses 2+, skin warm and well-perfused, sternotomoy and graft sites c/d/i. R lower leg below graft bandage appears red and some  bruising that is tender.     LABS:  CBC RESULTS:   Recent Labs   Lab Test  07/14/18   0700   WBC  8.9   RBC  2.96*   HGB  8.0*   HCT  27.4*   MCV  93   MCH  27.0   MCHC  29.2*   RDW  16.8*   PLT  248     Last Basic Metabolic Panel:  Lab Results   Component Value Date     07/16/2018      Lab Results   Component Value Date    POTASSIUM 3.7 07/16/2018     Lab Results   Component Value Date    CHLORIDE 101 07/16/2018     Lab Results   Component Value Date    ANTOINETTE 8.9 07/16/2018     Lab Results   Component Value Date    CO2 29 07/16/2018     Lab Results   Component Value Date    BUN 42 07/16/2018     Lab Results   Component Value Date    CR 1.53 07/16/2018     Lab Results   Component Value Date     07/16/2018     RLE US 7/16/18:  IMPRESSION:  1.  No evidence of right lower extremity deep venous thrombosis.  2.  There is a complex large fluid collection from the proximal medial  thigh to proximal calf. This is likely a hematoma at the site of  incision for CABG saphenous graft harvesting.    ASSESSMENT:  Mariel Ribeiro is a 73 year old female with a past medical history of DM2, HTN, CKD3, HLD, COPD, fibromyalgia, anxiety, depression, lymphedema, thyroid cancer s/p resection, and multivessel CAD who presented with with unstable angina. Patient had recurrent chest pain and her CABG was moved to 7/2/2018. On 7/2/2018 she underwent a 3 vessel CABG (LIMA to LAD, SVG to R PDA, and SVG to OMA2).     Rehabilitation - continue comprehensive acute inpatient rehabilitation program with multidisciplinary approach including therapies, rehab nursing, and physiatry following. See interval history for updates. Patient participating well.   Medical Conditions  #Coronary artery disease - s/p CABG x3  Patient with significant history of CAD in self and family. She has already received 2 stents in 2007. She is presenting with impairment in functional mobility and tasks. 3 vessel CABG performed to LAD, PDA and OMA2.  -Continue  sternal precautions.  -Continue metoprolol 75 BID.  -Continue hydralazine 75 TID.  -Bumex 3 mg IV tonight and in PM  - mg daily.  -Atorvastatin 40 mg daily.  -Tylenol 975 mg TID  -NTG PRN for angina.  -Hydromorphone 2 mg q8hrs PO PRN. Allergic to oxycodone.  -Hgb stable     #Left shoulder pain  Likely acute on chronic as she had rotator cuff repair in the distant past on this shoulder. Also, could be worsened from surgical positioning and referred pain.   -Continue APAP, lidocaine patch and cream and light ROM exercises.  -No joint injection at present due to DM and need for fluoroscopy.  -Consider outpatient orthopedics consultation.     #Chronic diastolic heart failure  Patient required IV diuresis in the acute hospital, on PO thus far, but will trial IV bumex x2.  -IV bumex tonight and tomorrow AM as Cr elevated. Likely cardiorenal as per medicine.  -Maintain good BP control (metoprolol and hydralazine ordered). Likely will go home with ACE-I (stopped in hospital due to increased Cr).  -EF of 55% intraoperatively.  -Medicine consult. Appreciate recs.     #COPD  -Albuterol nebs and inhaler PRN for severe and mild/moderate SOB, respectively.  -Fluticasone-vilanterol 1 puff daily.  -O2 PRN via NC. Sats well on RA now.  -CPAP at night as per RT.     #Hypothyroidism  Distant history of thyroid resection for thyroid cancer (in 1990s) with resultant hypothyroidism. Continue levothyroxine.  -Levothyroxine 150 mcg daily.     #Hypertension  -Continue metoprolol 75 BID.  -Continue hydralazine 75 TID.  -Bumex   -Likely will start ACE-I prior to discharge if Cr at baseline. Cr above baseline 1.3-1.4. Cr 1.7 today.     #Dyslipidemia  -Atorvastatin 40 mg daily.  -Niacin 500 mg BID.     #Type II diabetes mellitus  Patient with hard to control diabetes both inpatient and outpatient. A1C over 9 PTA. Endocrine consulted and we appreciate recs.  -Continue QID fingersticks.  -High correction dose scale ACTID and QHS.  -Prandin  TID replacing CHO counting.  -Insulin isophane human 35 units in the AM and 30 units at PM.  -Consistent CHO diet.  -Lyrica 100 mg BID for neuropathy (and fibromyalgia).     #Chronic kidney disease stage III  -Will check BMP in the AM tomorrow. Cr 1.7 today. Baseline (Cr 1.3-1.4).  -Good BP control as above and good glycemic control as above.  -Bumex IV 3 mg tonight and tomorrow.  -PO fluids max 2 - 2.5 L.     #Urinary tract infection, Enterobacter cloacae complex and E.coli  -Continue ciprofloxacin 250 mg BID. Cultures sensitive. Will follow. Last day 7/20.     #Paroxysmal SVT  -Continue metoprolol 75 BID.  -Obtain EKG if symptomatic.     #Gout, hyperuricemia  -Continue febuxostat 40 mg QHS     #Lymphedema  Patient reports this is worse since surgery. This started in 2007 after her two PCIs for CAD. Lymphedema being managed and appears stable today from admission.  -Some redness and bruising noted below graft site wound 7/16. Will continue to monitor. US revealed no DVT, but hematoma. Will monitor for possible cellulitis. Gout possible, but not a joint that is affected.     Adjustment to disability:  Clinical psychology as needed. Continue lexapro 20 mg daily and trazodone 150 mg QHS. Melatonin at night.  FEN: Consistent CHO, thin liquids. Trend Cr. BMP tomorrow AM. Potassium supplement. PICC in right arm (could be pulled if labs stable).  Bowel: Continent. Assistance with cares at times.  Bladder: WFL. UTI on cipro. Last day 7/20.  DVT prophylaxis: Heparin 5000 units TID  GI prophylaxis: Pantoprazole  Code: Full code  Disposition: Likely home with outpatient cardiopulmonary rehab services.  Estimated length of stay:  7-10 days.  Rehab prognosis:  Fair  Follow up appointments on discharge: 1) CT surgery 7/20 2) PCP within 2 weeks of d/c.    Rafi Crawford MD  PGY-2 PM&R  Pager: [phone]

## 2018-07-17 NOTE — PROGRESS NOTES
Pt attended seated yoga exercise class today with group of 3 patients.  Pt selected for class due to documented strength and mobility deficits.  Class includes education in movement and breath awareness, and time spent doing seated yoga exercises lead by staff. Pt was able to participate in modified movements due to increased pain in R shoulder. Patient limited with overall functional endurance and required use of w/c for transport to/from dining room.

## 2018-07-17 NOTE — TELEPHONE ENCOUNTER
"Requested Prescriptions   Pending Prescriptions Disp Refills     traZODone (DESYREL) 50 MG tablet [Pharmacy Med Name: TRAZODONE 50MG TABLETS]  Last Written Prescription Date:  11-15-17  Last Fill Quantity: 90 tab,  # refills: 7   Last office visit: 5/22/2018 with prescribing provider:  Rafaela William    Future Office Visit:    90 tablet 0     Sig: TAKE 3 TABLETS(150 MG) BY MOUTH AT BEDTIME    Serotonin Modulators Passed    7/17/2018  3:46 AM       Passed - Recent (12 mo) or future (30 days) visit within the authorizing provider's specialty    Patient had office visit in the last 12 months or has a visit in the next 30 days with authorizing provider or within the authorizing provider's specialty.  See \"Patient Info\" tab in inbasket, or \"Choose Columns\" in Meds & Orders section of the refill encounter.           Passed - Patient is age 18 or older       Passed - No active pregnancy on record       Passed - No positive pregnancy test in past 12 months     ____________________________________         niacin (NIASPAN) 500 MG CR tablet [Pharmacy Med Name: NIACIN 500MG ER TABLETS]  Last Written Prescription Date:  6-7-17  Last Fill Quantity: 180 tab,  # refills: 1   Last office visit: 5/22/2018 with prescribing provider:  Rafaela William    Future Office Visit:    60 tablet 0     Sig: TAKE 1 TABLET(500 MG) BY MOUTH TWICE DAILY    Antihyperlipidemic agents Passed    7/17/2018  3:46 AM       Passed - Lipid panel on file in past 12 mos    Recent Labs   Lab Test  03/19/18   1404   05/05/15   0751   CHOL  203*   < >  145   TRIG  212*   < >  221*   HDL  50   < >  45*   LDL  111*   < >  56   NHDL  153*   < >   --    VLDL   --    --   44*   CHOLHDLRATIO   --    --   3.2    < > = values in this interval not displayed.          Passed - Normal serum ALT on record in past 12 mos    Recent Labs   Lab Test  07/01/18   0621   ALT  26          Passed - Recent (12 mo) or future (30 days) visit within the authorizing provider's " "specialty    Patient had office visit in the last 12 months or has a visit in the next 30 days with authorizing provider or within the authorizing provider's specialty.  See \"Patient Info\" tab in inbasket, or \"Choose Columns\" in Meds & Orders section of the refill encounter.           Passed - Patient is age 18 years or older       Passed - No active pregnancy on record       Passed - No positive pregnancy test in past 12 mos          "

## 2018-07-17 NOTE — PLAN OF CARE
Problem: Goal/Outcome  Goal: Goal Outcome Summary  Pt attended Falls Prevention class today with group of 5 patients.  Pt selected for class due to documented gait deficit and falls risk.  Class includes education in falls risks, how to decrease that risk through behavior and home modifications and energy conservation; and instruction in available equipment designed to increase home safety. Pt was able to verbalize understanding of materials and participated appropriately in the discussion and problem-solving segments of the class.

## 2018-07-17 NOTE — PROVIDER NOTIFICATION
Focus: Sternal bones  D: Pt. Feels as though her sternal bones are moving unevenly. I: Dr. Camargo notified. P: Continue to monitor.

## 2018-07-17 NOTE — PROGRESS NOTES
FOCUS/GOAL  Medical management, Mobility and Cognition/Memory/Judgment/Problem solving    ASSESSMENT, INTERVENTIONS AND CONTINUING PLAN FOR GOAL:  Alert and oriented x4 , used call light appropriately and verbalized needs well   This Rn was informed by the Therapist at start of shift re pt having pain on the rt leg when moving she has an incision on that rt leg which is the harvest site when she had a CABG , site was not warm and the incision site is CDI PMR was  updated US of lower extremity was done and it was negative for DVT , there is a fluid collection from the proximal medial to proximal calf , on call resident was updated with the result , scheduled Tylenol was given for pain at hs .  CGA assist with transfer using walker  Chest incisions is IMANI dressing was changed on the 3 old chest tube sites   Edema on both legs , lymphedema wrapped was taken off when pt was send for US , she does not want it reapplied at hs she said she would like it apply in AM .  She does not sleep in the bed , staff got a recliner for her and she was assisted by staff on a comfortable position in the recliner with her feet elevated   Will continue to assist with transfer and ambulation , monitor incision and pain and provide comfort .

## 2018-07-17 NOTE — CARE CONFERENCE
Acute Rehab Care Conference/Team Rounds      Type: Team Rounds    Present: Dr. Meet Pan, Dr José Crawford Resident, Maxx Kidd RN, Liudmila Lee SW, Sherry Mercado OT, Madhav Liang PT, Marques Stapleton SLP, Allyssa Quintana , Luís Cage, Elizabeth Maloney Dietician      Discharge Barriers/Treatment/Education    Rehab Diagnosis: CABG    Active Medical Co-morbidities/Prognosis: acute on chronic renal insuffiencey, obesity and lymphedema BMI 49, left shoulder pain with severe OA and older rotator cuff, DM with some adjustment to medications,     Safety: Pt is alert and oriented, uses call light and expresses needs appropriately, sleeping in recliner, transferring with CGA assist with ww and gait belt.       Pain: Pt reporting pain in RLE, and sternum, receiving tylenol with partial relief and declining dilaudid .     Medications, Skin, Tubes/Lines: Pt is able to take pills whole with water and applesauce, would benefit from MAP, 3 chest tube sites CDI covered with foam dressings, foam dressings covering two insicion sites on right leg and 1 on left, gluteal fissure covered by  PICC in RUE.         Swallowing/Nutrition:    Bowel/Bladder: Pt has intermittent dribbling incontinentce, and is continent of stool, transferring to toilet to defecate and void.     Psychosocial: Pt resides with a roommate. Pt reported good support from roommate and several other close friends that will be available to provide additional support. Goal to return home. Team working towards a safe d/c plan.     ADLs/IADLs: Pt is mod I in the room for basic adls with SPC/rollator as needed but continues to require assistance for ralph care after bowel movement as well as for shoes. Continue skilled OT to improve for adls/iadls and home with roommate who can assist for basic care. DME: has all needed equipment.     Mobility: Pt now mod I in room with 4WW. Amb 150+ ft with 4WW at SBA with rest breaks to manage SOB, navigating  "4x6\" steps with B rails at [facility]. Need to progress independence with stairs prior to d/c home. Pt reporting she will have someone bring in her typical lymphedema wraps for BLE to assess fit and practice her home routine with lymphedema management prior to returning home. Rec HH PT initially at d/c. Pt owns DME necessary for home.    Cognition/Language: no issues.     Community Re-Entry: amb limited community distances 100-200 ft with 4WW mod I; w/c based for longer community distances.    Transportation: rec assist from friends/family.    Decision maker: self    Plan of Care and goals reviewed and updated.    Discharge Plan/Recommendations    Fall Precautions: continue    Overall plan for the patient: participating in therapies though various pains continue to impact function (shoulder, chest, right leg). Getting some IV diuresis added today. Have advanced to independence for room, still work with stairs and some assist for ralph cares but suspect can get to independence with that soon. Plan probable outpatient cardiac rehab after d/c. D/C Friday.      Utilization Review and Continued Stay Justification    Medical Necessity Criteria:    For any criteria that is not met, please document reason and plan for discharge, transfer, or modification of plan of care to address.    Requires intensive rehabilitation program to treat functional deficits?: Yes    Requires 3x per week or greater involvement of rehabilitation physician to oversee rehabilitation program?: Yes    Requires rehabilitation nursing interventions?: Yes    Patient is making functional progress?: Yes    There is a potential for additional functional progress? Yes    Patient is participating in therapy 3 hours per day a minimum of 5 days per week or 15 hours per week in 7 day period?:Yes    Has discharge needs that require coordinated discharge planning approach?:Yes      Final Physician Sign off    Statement of Approval: I agree with all the recommendations " detailed in this document.     Patient Goals        1. Pt will d/c home/community setting upon completion of therapy/RN goals.  2. Pt and family will be involved in d/c planning and made aware of services available to meet pts needs.            OT goal: hygiene/grooming: while standing, within precautions, modified independent  OT goal: upper body dressing: including set-up/clothing retrieval, Modified independent  OT goal: lower body dressing: Modified independent, using adaptive equipment, including set-up/clothing retrieval  OT goal: upper body bathing: Minimal assist, using adaptive equipment, within precautions  OT goal: lower body bathing: using adaptive equipment, Minimal assist, with precautions     OT Goal: transfer: Modified independent, within precautions, with assistive device  OT goal: toilet transfer/toileting: Modified independent, using adaptive equipment, cleaning and garment management, within precautions (including ralph-care)  OT goal: meal preparation: Supervision/stand-by assist, with simple meal preparation, using adaptive equipment, ambulatory level  OT goal: home management: Supervision/stand-by assist, with light demand household tasks, using adaptive equipment, within precautions, ambulatory level  OT goal: cognitive: Patient/caregiver will verbalize understanding of cognitive assessment results/recommendations as needed for safe discharge planning  OT goal 1: Pt will demonstrate understanding of BUE HEP with use of visual handout by discharge for increased success with functional tasks.                    PT Frequency: PT daily  min  PT goal: bed mobility: Modified independent, Supine to/from sit, Rolling, Within precautions  PT goal: transfers: Modified independent, Sit to/from stand, Bed to/from chair, Within precautions, Assistive device  PT goal: gait: Modified independent, Within precautions, 150 feet, Rolling walker  PT goal: stairs: 2 stairs, Rail on both sides,  Supervision/stand-by assist  PT goal: perform aerobic activity with stable cardiovascular response: 15 minutes, NuStep  Edema Management (PT): Gradient compression bandaging, With caregiver assist  PT goal 1: Complete TUG  PT Goal 2: Car transfer, 4WW, mod I  PT Goal 3: Verbally report steps to recover safely from the floor    Patient Goal:  Pain Management: Pt will self advocate for pain management, will request for pain interventions as pain occurs.              Patient/Family Goal: Bladder: Pt will be continent of bladder by discharge.           Goal: Mobility: Pt will be independent in mobility by discharge.

## 2018-07-17 NOTE — PROGRESS NOTES
Social Work Services Discharge Note      Patient Name:  Mariel Ribeiro     Anticipated Discharge Date: 07/13/18 @ 1300    Discharge Disposition:   Facility: [facility]  [phone]  Nurse to Nurse Call: PH: [phone]    Following MD: Rafaela William  [address] [facility] / [place] [address]     Pre-Admission Screening (PAS) online form has been completed.  The Level of Care (LOC) is:  Determined  Confirmation Code is:  NA  Patient/caregiver informed of referral to [facility] for Pre-Admission Screening for skilled nursing facility (SNF) placement and to expect a phone call post discharge from SNF.     Additional Services/Equipment Arranged:    - Transportation: [employer] (PH: [phone]) wheelchair ride scheduled for 1300.  Pt and family are aware and in agreement that insurance may not cover wheelchair ride: NA    Patient / Family response to discharge plan:  Pt and friend Odalis in agreement with the plan.     Persons notified of above discharge plan:  Pt, Friend Odalis, bedside RN, CVTS team, PCP, SNF admissions.    Staff Discharge Instructions:  Please fax discharge orders and signed hard scripts for any controlled substances.  Please print a packet and send with patient.     CTS Handoff completed:  YES    Medicare Notice of Rights provided to the patient/family:  YES    SAMANTHA Baptiste, TATIANNAW  [address]   Phone: [phone]  Pager: [phone]  Unit: [phone]

## 2018-07-17 NOTE — PLAN OF CARE
Problem: Patient Care Overview  Goal: Plan of Care/Patient Progress Review  Discharge Planner OT   Patient plan for discharge: home with A from roomate  Current status: Pt. Indep./Ericka using A.E. To A with LB dressing. Pt. Amb. Throughout room with SEC and without AD. Pt. Amb. At slow, steady pace with no LOB.  Barriers to return to prior living situation: post op precautions, below baseline with ADL/IADLs  Recommendations for discharge: home with A for IADLs prn and OP CR  Rationale for recommendations: to maintain post op precautions, increasing activity marysol./strength, to max. Safety/indep. With ADLs, mobility in home/community setting.       Entered by: Jacqui Dubon 07/17/2018 8:13 AM

## 2018-07-17 NOTE — PROVIDER NOTIFICATION
Dr. Jones informed verbally of pt having aching chest pain that is aggravated by movement  of upper extremities, and has been given scheduled tylenol but denies dilaudid, Dr. Jones stated that this is likely incisional pain related to surgery, information passed onto oncoming nurse .

## 2018-07-17 NOTE — PROGRESS NOTES
Diabetes Consult Daily  Progress Note          Assessment/Plan:   Mariel Ribeiro is a 73 year old female with a history of type 2 diabetes,obesity, COPD, CAD with history of stent placement, CKD stage III, hyperlipidemia,fibromyalgia, anxiety, depression, lymphedema, S/P CABG on 07/ 02/18.     Glucoses mostly in the low to mid 100s, except when she misses aspart for snacks (then up to 200s).     PLAN  -NPH 35 units am ( 0900)  -NPH 30 units PM ( 2000)  -repaglinide 1mg TID with meals   -aspart for correction  high intensity ac and hs  -monitor glucose ac, hs, and 0200         Outpatient diabetes follow up: Pt and SO would like to consider seeing endocrinologist-  number for [facility] included in AVS.  Plan discussed with patient                                Interval History:   The last 24 hours progress and nursing notes reviewed.    Blood sugars have been in good control with Novolog 1 unit per 5 grams of CHO  Am glucose 105/99  Starting with breakfast, trying Repaglinide 1 mg with meals,   Appetite is good, denies nausea and or vomiting. Will monitor for 24 hours before increasing dose of repaglinide             PTA:  Will need to transition from Novolog to Regular insulin on discharge from ARU., if current plan does not work   NPH 35/40  Regular 16 to 18 units breakfast and pre-lunch  Dinner 10-12 units         Recent Labs  Lab 07/17/18  1225 07/17/18  0749 07/17/18  0743 07/17/18  0152 07/16/18  2120 07/16/18  1847 07/16/18  1210  07/16/18  0606  07/15/18  0613  07/14/18  0700  07/13/18  0507  07/12/18  0612   GLC  --  99  --   --   --   --   --   --  171*  --  128*  --  156*  --  130*  --  114*   *  --  105* 106* 111* 85 150*  < >  --   < >  --   < >  --   < >  --   < >  --    < > = values in this interval not displayed.            Review of Systems:   See interval hx          Medications:       Active Diet Order      Combination Diet 9012-5806 Calories: High  "Consistent CHO (4-7 CHO units/meal); Thin Liquids (water, ice chips, juice, milk, gelatin, ice cream, etc)     Physical Exam:  Gen: Alert, sitting in chair,NAD   HEENT: mucous membranes are moist  Resp: non-labored  Ext: + lower extremity edema   Neuro:oriented x3, communicating clearly  /58  Pulse 69  Temp 97.8  F (36.6  C) (Oral)  Resp 16  Ht 1.676 m (5' 6\")  Wt 139.8 kg (308 lb 3.2 oz)  SpO2 96%  BMI 49.74 kg/m2           Data:     Lab Results   Component Value Date    A1C 9.3 07/01/2018    A1C 8.2 03/19/2018    A1C 7.3 08/09/2017    A1C 7.2 03/08/2017    A1C 7.3 12/23/2016              CBC RESULTS:   Recent Labs   Lab Test  07/17/18   0749   WBC  10.8   RBC  3.23*   HGB  8.6*   HCT  28.8*   MCV  89   MCH  26.6   MCHC  29.9*   RDW  15.4*   PLT  235     Recent Labs   Lab Test  07/17/18   0749  07/16/18   0606   NA  139  138   POTASSIUM  3.2*  3.7   CHLORIDE  104  101   CO2  27  29   ANIONGAP  8  8   GLC  99  171*   BUN  46*  42*   CR  1.71*  1.53*   ANTOINETTE  9.0  8.9     Liver Function Studies -   Recent Labs   Lab Test  07/01/18   0621   PROTTOTAL  6.1*   ALBUMIN  3.1*   BILITOTAL  1.0   ALKPHOS  100   AST  20   ALT  26     Lab Results   Component Value Date    INR 1.09 07/06/2018    INR 1.30 07/03/2018    INR 1.40 07/02/2018    INR 1.52 07/02/2018    INR 1.01 06/29/2018    INR 1.12 08/08/2017    INR 1.04 05/18/2017    INR 1.06 11/18/2016    INR 1.02 10/27/2016    INR 1.19 12/19/2015    INR 1.08 05/05/2015    INR 1.4 05/04/2015    INR 0.97 01/28/2014         Olgadl Oswaldogerry -[phone]  Diabetes Management job code [id number]            "

## 2018-07-17 NOTE — PLAN OF CARE
Problem: Individualization  Goal: Patient Individualization  Outcome: Improving  Focus: Physio  D: Alert and orientated times four. Up with assist of one and cane. Picc line intact. Sleeps in recliner most of the time. Complaints of muscular chest pain. Denies offer of dilaudid. MD ordered pain cream. Takes bigger pills with applesauce.  and 169. Right inner calf pink and warm to touch when compared to other calf. Other incisions look good. K+ 3.2. Replaced with a total of 60 MEQ. P: Recheck K+ at 1600.

## 2018-07-18 LAB
ABO + RH BLD: NORMAL
ABO + RH BLD: NORMAL
ANION GAP SERPL CALCULATED.3IONS-SCNC: 4 MMOL/L (ref 3–14)
ANION GAP SERPL CALCULATED.3IONS-SCNC: 7 MMOL/L (ref 3–14)
BASOPHILS # BLD AUTO: 0 10E9/L (ref 0–0.2)
BASOPHILS NFR BLD AUTO: 0.5 %
BLD GP AB SCN SERPL QL: NORMAL
BLD PROD TYP BPU: NORMAL
BLD PROD TYP BPU: NORMAL
BLD UNIT ID BPU: 0
BLOOD BANK CMNT PATIENT-IMP: NORMAL
BLOOD PRODUCT CODE: NORMAL
BPU ID: NORMAL
BUN SERPL-MCNC: 45 MG/DL (ref 7–30)
BUN SERPL-MCNC: 45 MG/DL (ref 7–30)
CALCIUM SERPL-MCNC: 8.6 MG/DL (ref 8.5–10.1)
CALCIUM SERPL-MCNC: 8.9 MG/DL (ref 8.5–10.1)
CHLORIDE SERPL-SCNC: 105 MMOL/L (ref 94–109)
CHLORIDE SERPL-SCNC: 105 MMOL/L (ref 94–109)
CO2 SERPL-SCNC: 28 MMOL/L (ref 20–32)
CO2 SERPL-SCNC: 31 MMOL/L (ref 20–32)
CREAT SERPL-MCNC: 1.5 MG/DL (ref 0.52–1.04)
CREAT SERPL-MCNC: 1.55 MG/DL (ref 0.52–1.04)
DIFFERENTIAL METHOD BLD: ABNORMAL
EOSINOPHIL # BLD AUTO: 0.3 10E9/L (ref 0–0.7)
EOSINOPHIL NFR BLD AUTO: 2.9 %
ERYTHROCYTE [DISTWIDTH] IN BLOOD BY AUTOMATED COUNT: 15.6 % (ref 10–15)
GFR SERPL CREATININE-BSD FRML MDRD: 33 ML/MIN/1.7M2
GFR SERPL CREATININE-BSD FRML MDRD: 34 ML/MIN/1.7M2
GLUCOSE BLDC GLUCOMTR-MCNC: 165 MG/DL (ref 70–99)
GLUCOSE BLDC GLUCOMTR-MCNC: 168 MG/DL (ref 70–99)
GLUCOSE BLDC GLUCOMTR-MCNC: 175 MG/DL (ref 70–99)
GLUCOSE BLDC GLUCOMTR-MCNC: 207 MG/DL (ref 70–99)
GLUCOSE BLDC GLUCOMTR-MCNC: 208 MG/DL (ref 70–99)
GLUCOSE BLDC GLUCOMTR-MCNC: 221 MG/DL (ref 70–99)
GLUCOSE SERPL-MCNC: 144 MG/DL (ref 70–99)
GLUCOSE SERPL-MCNC: 144 MG/DL (ref 70–99)
HCT VFR BLD AUTO: 26.4 % (ref 35–47)
HGB BLD-MCNC: 7.9 G/DL (ref 11.7–15.7)
IMM GRANULOCYTES # BLD: 0 10E9/L (ref 0–0.4)
IMM GRANULOCYTES NFR BLD: 0.2 %
LYMPHOCYTES # BLD AUTO: 1.5 10E9/L (ref 0.8–5.3)
LYMPHOCYTES NFR BLD AUTO: 17.2 %
MCH RBC QN AUTO: 27 PG (ref 26.5–33)
MCHC RBC AUTO-ENTMCNC: 29.9 G/DL (ref 31.5–36.5)
MCV RBC AUTO: 90 FL (ref 78–100)
MONOCYTES # BLD AUTO: 0.8 10E9/L (ref 0–1.3)
MONOCYTES NFR BLD AUTO: 9.5 %
NEUTROPHILS # BLD AUTO: 6 10E9/L (ref 1.6–8.3)
NEUTROPHILS NFR BLD AUTO: 69.7 %
NRBC # BLD AUTO: 0 10*3/UL
NRBC BLD AUTO-RTO: 0 /100
NUM BPU REQUESTED: 1
PLATELET # BLD AUTO: 240 10E9/L (ref 150–450)
POTASSIUM SERPL-SCNC: 4.5 MMOL/L (ref 3.4–5.3)
POTASSIUM SERPL-SCNC: 4.6 MMOL/L (ref 3.4–5.3)
RBC # BLD AUTO: 2.93 10E12/L (ref 3.8–5.2)
SODIUM SERPL-SCNC: 140 MMOL/L (ref 133–144)
SODIUM SERPL-SCNC: 140 MMOL/L (ref 133–144)
SPECIMEN EXP DATE BLD: NORMAL
TRANSFUSION STATUS PATIENT QL: NORMAL
TRANSFUSION STATUS PATIENT QL: NORMAL
WBC # BLD AUTO: 8.6 10E9/L (ref 4–11)

## 2018-07-18 PROCEDURE — 25000128 H RX IP 250 OP 636: Performed by: STUDENT IN AN ORGANIZED HEALTH CARE EDUCATION/TRAINING PROGRAM

## 2018-07-18 PROCEDURE — 86850 RBC ANTIBODY SCREEN: CPT | Performed by: PHYSICAL MEDICINE & REHABILITATION

## 2018-07-18 PROCEDURE — 85025 COMPLETE CBC W/AUTO DIFF WBC: CPT | Performed by: STUDENT IN AN ORGANIZED HEALTH CARE EDUCATION/TRAINING PROGRAM

## 2018-07-18 PROCEDURE — 80048 BASIC METABOLIC PNL TOTAL CA: CPT | Performed by: STUDENT IN AN ORGANIZED HEALTH CARE EDUCATION/TRAINING PROGRAM

## 2018-07-18 PROCEDURE — A9270 NON-COVERED ITEM OR SERVICE: HCPCS | Mod: GY | Performed by: PHYSICAL MEDICINE & REHABILITATION

## 2018-07-18 PROCEDURE — 25000132 ZZH RX MED GY IP 250 OP 250 PS 637: Mod: GY | Performed by: GENERAL PRACTICE

## 2018-07-18 PROCEDURE — 97110 THERAPEUTIC EXERCISES: CPT | Mod: GP

## 2018-07-18 PROCEDURE — 86900 BLOOD TYPING SEROLOGIC ABO: CPT | Performed by: PHYSICAL MEDICINE & REHABILITATION

## 2018-07-18 PROCEDURE — 25000132 ZZH RX MED GY IP 250 OP 250 PS 637: Mod: GY | Performed by: PHYSICAL MEDICINE & REHABILITATION

## 2018-07-18 PROCEDURE — A9270 NON-COVERED ITEM OR SERVICE: HCPCS | Mod: GY | Performed by: GENERAL PRACTICE

## 2018-07-18 PROCEDURE — 25000131 ZZH RX MED GY IP 250 OP 636 PS 637: Mod: GY | Performed by: STUDENT IN AN ORGANIZED HEALTH CARE EDUCATION/TRAINING PROGRAM

## 2018-07-18 PROCEDURE — 86901 BLOOD TYPING SEROLOGIC RH(D): CPT | Performed by: PHYSICAL MEDICINE & REHABILITATION

## 2018-07-18 PROCEDURE — A9270 NON-COVERED ITEM OR SERVICE: HCPCS | Mod: GY | Performed by: STUDENT IN AN ORGANIZED HEALTH CARE EDUCATION/TRAINING PROGRAM

## 2018-07-18 PROCEDURE — 25000125 ZZHC RX 250: Performed by: STUDENT IN AN ORGANIZED HEALTH CARE EDUCATION/TRAINING PROGRAM

## 2018-07-18 PROCEDURE — 36592 COLLECT BLOOD FROM PICC: CPT | Performed by: STUDENT IN AN ORGANIZED HEALTH CARE EDUCATION/TRAINING PROGRAM

## 2018-07-18 PROCEDURE — 97530 THERAPEUTIC ACTIVITIES: CPT | Mod: GO

## 2018-07-18 PROCEDURE — 36592 COLLECT BLOOD FROM PICC: CPT | Performed by: PHYSICAL MEDICINE & REHABILITATION

## 2018-07-18 PROCEDURE — 12800006 ZZH R&B REHAB

## 2018-07-18 PROCEDURE — 25000132 ZZH RX MED GY IP 250 OP 250 PS 637: Mod: GY | Performed by: STUDENT IN AN ORGANIZED HEALTH CARE EDUCATION/TRAINING PROGRAM

## 2018-07-18 PROCEDURE — 40000193 ZZH STATISTIC PT WARD VISIT

## 2018-07-18 PROCEDURE — 86923 COMPATIBILITY TEST ELECTRIC: CPT | Performed by: PHYSICAL MEDICINE & REHABILITATION

## 2018-07-18 PROCEDURE — 80048 BASIC METABOLIC PNL TOTAL CA: CPT | Performed by: PHYSICAL MEDICINE & REHABILITATION

## 2018-07-18 PROCEDURE — A9270 NON-COVERED ITEM OR SERVICE: HCPCS | Mod: GY | Performed by: PHYSICIAN ASSISTANT

## 2018-07-18 PROCEDURE — 97535 SELF CARE MNGMENT TRAINING: CPT | Mod: GO

## 2018-07-18 PROCEDURE — 25000132 ZZH RX MED GY IP 250 OP 250 PS 637: Mod: GY | Performed by: PHYSICIAN ASSISTANT

## 2018-07-18 PROCEDURE — 40000133 ZZH STATISTIC OT WARD VISIT

## 2018-07-18 PROCEDURE — 97530 THERAPEUTIC ACTIVITIES: CPT | Mod: GP

## 2018-07-18 PROCEDURE — 00000146 ZZHCL STATISTIC GLUCOSE BY METER IP

## 2018-07-18 PROCEDURE — P9016 RBC LEUKOCYTES REDUCED: HCPCS | Performed by: PHYSICAL MEDICINE & REHABILITATION

## 2018-07-18 RX ORDER — SODIUM CHLORIDE 9 MG/ML
INJECTION, SOLUTION INTRAVENOUS
Status: DISPENSED
Start: 2018-07-18 | End: 2018-07-18

## 2018-07-18 RX ORDER — FUROSEMIDE 10 MG/ML
40 INJECTION INTRAMUSCULAR; INTRAVENOUS ONCE
Status: COMPLETED | OUTPATIENT
Start: 2018-07-18 | End: 2018-07-18

## 2018-07-18 RX ADMIN — PREGABALIN 100 MG: 100 CAPSULE ORAL at 20:41

## 2018-07-18 RX ADMIN — CHOLECALCIFEROL CAP 125 MCG (5000 UNIT) 10000 UNITS: 125 CAP at 08:51

## 2018-07-18 RX ADMIN — INSULIN ASPART 2 UNITS: 100 INJECTION, SOLUTION INTRAVENOUS; SUBCUTANEOUS at 09:14

## 2018-07-18 RX ADMIN — TRAZODONE HYDROCHLORIDE 150 MG: 50 TABLET ORAL at 22:18

## 2018-07-18 RX ADMIN — Medication 3 MG: at 22:18

## 2018-07-18 RX ADMIN — FUROSEMIDE 40 MG: 10 INJECTION, SOLUTION INTRAMUSCULAR; INTRAVENOUS at 16:01

## 2018-07-18 RX ADMIN — INSULIN HUMAN 35 UNITS: 100 INJECTION, SUSPENSION SUBCUTANEOUS at 09:17

## 2018-07-18 RX ADMIN — BUMETANIDE 3 MG: 0.25 INJECTION INTRAMUSCULAR; INTRAVENOUS at 17:46

## 2018-07-18 RX ADMIN — LEVOTHYROXINE SODIUM 150 MCG: 25 TABLET ORAL at 08:52

## 2018-07-18 RX ADMIN — INSULIN HUMAN 30 UNITS: 100 INJECTION, SUSPENSION SUBCUTANEOUS at 22:21

## 2018-07-18 RX ADMIN — INSULIN ASPART 2 UNITS: 100 INJECTION, SOLUTION INTRAVENOUS; SUBCUTANEOUS at 18:54

## 2018-07-18 RX ADMIN — BUMETANIDE 3 MG: 0.25 INJECTION INTRAMUSCULAR; INTRAVENOUS at 08:52

## 2018-07-18 RX ADMIN — POLYETHYLENE GLYCOL 3350 17 G: 17 POWDER, FOR SOLUTION ORAL at 08:52

## 2018-07-18 RX ADMIN — ASPIRIN 325 MG: 325 TABLET, DELAYED RELEASE ORAL at 08:52

## 2018-07-18 RX ADMIN — REPAGLINIDE 1 MG: 1 TABLET ORAL at 12:47

## 2018-07-18 RX ADMIN — HEPARIN SODIUM 5000 UNITS: 5000 INJECTION, SOLUTION INTRAVENOUS; SUBCUTANEOUS at 06:36

## 2018-07-18 RX ADMIN — INSULIN ASPART 1 UNITS: 100 INJECTION, SOLUTION INTRAVENOUS; SUBCUTANEOUS at 22:21

## 2018-07-18 RX ADMIN — GUAIFENESIN 600 MG: 600 TABLET, EXTENDED RELEASE ORAL at 08:52

## 2018-07-18 RX ADMIN — NIACIN ER 500 MG TABLET,EXTENDED RELEASE 500 MG: at 20:41

## 2018-07-18 RX ADMIN — INSULIN ASPART 3 UNITS: 100 INJECTION, SOLUTION INTRAVENOUS; SUBCUTANEOUS at 13:58

## 2018-07-18 RX ADMIN — NIACIN ER 500 MG TABLET,EXTENDED RELEASE 500 MG: at 08:52

## 2018-07-18 RX ADMIN — LIDOCAINE 3 PATCH: 560 PATCH PERCUTANEOUS; TOPICAL; TRANSDERMAL at 21:06

## 2018-07-18 RX ADMIN — ATORVASTATIN CALCIUM 40 MG: 40 TABLET, FILM COATED ORAL at 20:41

## 2018-07-18 RX ADMIN — REPAGLINIDE 1 MG: 1 TABLET ORAL at 17:47

## 2018-07-18 RX ADMIN — ESCITALOPRAM OXALATE 20 MG: 20 TABLET ORAL at 08:51

## 2018-07-18 RX ADMIN — REPAGLINIDE 1 MG: 1 TABLET ORAL at 06:36

## 2018-07-18 RX ADMIN — ACETAMINOPHEN 975 MG: 325 TABLET, FILM COATED ORAL at 14:04

## 2018-07-18 RX ADMIN — GUAIFENESIN 600 MG: 600 TABLET, EXTENDED RELEASE ORAL at 21:03

## 2018-07-18 RX ADMIN — HYDRALAZINE HYDROCHLORIDE 75 MG: 50 TABLET ORAL at 12:18

## 2018-07-18 RX ADMIN — CIPROFLOXACIN HYDROCHLORIDE 250 MG: 250 TABLET, FILM COATED ORAL at 08:52

## 2018-07-18 RX ADMIN — PANTOPRAZOLE SODIUM 40 MG: 40 TABLET, DELAYED RELEASE ORAL at 06:36

## 2018-07-18 RX ADMIN — CIPROFLOXACIN HYDROCHLORIDE 250 MG: 250 TABLET, FILM COATED ORAL at 20:41

## 2018-07-18 RX ADMIN — ACETAMINOPHEN 975 MG: 325 TABLET, FILM COATED ORAL at 06:36

## 2018-07-18 RX ADMIN — Medication: at 20:58

## 2018-07-18 RX ADMIN — FEBUXOSTAT 40 MG: 40 TABLET ORAL at 20:41

## 2018-07-18 RX ADMIN — ACETAMINOPHEN 975 MG: 325 TABLET, FILM COATED ORAL at 20:41

## 2018-07-18 RX ADMIN — METOPROLOL TARTRATE 75 MG: 25 TABLET, FILM COATED ORAL at 08:51

## 2018-07-18 RX ADMIN — FLUTICASONE FUROATE AND VILANTEROL TRIFENATATE 1 PUFF: 100; 25 POWDER RESPIRATORY (INHALATION) at 09:14

## 2018-07-18 RX ADMIN — ACETAMINOPHEN 800 MCG: 400 TABLET ORAL at 08:51

## 2018-07-18 RX ADMIN — METOPROLOL TARTRATE 75 MG: 25 TABLET, FILM COATED ORAL at 20:42

## 2018-07-18 RX ADMIN — HEPARIN SODIUM 5000 UNITS: 5000 INJECTION, SOLUTION INTRAVENOUS; SUBCUTANEOUS at 12:47

## 2018-07-18 RX ADMIN — Medication: at 14:05

## 2018-07-18 RX ADMIN — HEPARIN SODIUM 5000 UNITS: 5000 INJECTION, SOLUTION INTRAVENOUS; SUBCUTANEOUS at 21:06

## 2018-07-18 RX ADMIN — PREGABALIN 100 MG: 100 CAPSULE ORAL at 08:51

## 2018-07-18 NOTE — PLAN OF CARE
Problem: Goal/Outcome  Goal: Goal Outcome Summary  FOCUS/GOAL  Medical management    ASSESSMENT, INTERVENTIONS AND CONTINUING PLAN FOR GOAL:  Patient is alert and oriented and able to make her needs known. Patient is modified independent in her room, but continues to need assist with ralph-cares after toileting. Patient also unable to get up independently from current recliner in room. She required assist of two staff members to assist her to stand during the night. Writer did alert oncoming shift that patient requires a new recliner in her room in order to be independent with mobility. Patient prefers to sleep in recliner overnight. CPAP in place overnight and 02 95%. Blood glucose level at 0200 was 208. Patient took all medications without difficulty and denied difficulty with pain. Patient needs encouragement to drink fluids.

## 2018-07-18 NOTE — PROGRESS NOTES
Diabetes Consult Daily  Progress Note          Assessment/Plan:    Mariel Ribeiro is a 73 year old female with a history of type 2 diabetes,obesity, COPD, CAD with history of stent placement, CKD stage III, hyperlipidemia,fibromyalgia, anxiety, depression, lymphedema, S/P CABG on 07/ 02/18.      Glucoses mostly in the low to mid 100s, except when she misses aspart for snacks (then up to 200s).      PLAN  -NPH 35 units am ( 0900)  -NPH 30 units PM ( 2000)  -repaglinide 1mg TID with meals   -aspart for correction  high intensity ac and hs  -monitor glucose ac, hs, and 0200          Outpatient diabetes follow up: Pt and SO would like to consider seeing endocrinologist- ph number for [facility] included in AVS.  Plan discussed with patient and  pharmacist                                                      Interval History:   The last 24 hours progress and nursing notes reviewed.  Blood sugars are immoderately controlled, but did not receive 2 doses of Repaglinide yesterday  Discussed with pharmacy, will make sure medication is available for nursing.  Will continue with same plan for 24 hours, may need to increase repaglinide tomorrow  appetite is reported as good, denies nausea and or vomiting  Is working with [facility]        Recent Labs  Lab 07/18/18  1342 07/18/18  1233 07/18/18  0813 07/18/18  0740 07/18/18  0201 07/17/18  2100 07/17/18  1759  07/17/18  0749  07/16/18  0606  07/15/18  0613  07/14/18  0700  07/13/18  0507   GLC  --   --   --  144*  --   --   --   --  99  --  171*  --  128*  --  156*  --  130*   * 207* 165*  --  208* 219* 139*  < >  --   < >  --   < >  --   < >  --   < >  --    < > = values in this interval not displayed.            Review of Systems:   See interval hx          Medications:       Active Diet Order      Combination Diet [id number] Calories: High Consistent CHO (4-7 CHO units/meal); Thin Liquids (water, ice chips, juice, milk, gelatin, ice cream,  "etc)     Physical Exam:  Gen: Alert,  NAD   HEENT:  mucous membranes are moist  Resp: non-labored  Ext: moving all extremities, + bilateral lower extremity edema   Neuro:oriented x3, communicating clearly  /50  Pulse 86  Temp 97  F (36.1  C)  Resp 18  Ht 1.676 m (5' 6\")  Wt 139.8 kg (308 lb 3.2 oz)  SpO2 96%  BMI 49.74 kg/m2           Data:     Lab Results   Component Value Date    A1C 9.3 07/01/2018    A1C 8.2 03/19/2018    A1C 7.3 08/09/2017    A1C 7.2 03/08/2017    A1C 7.3 12/23/2016              CBC RESULTS:   Recent Labs   Lab Test  07/18/18   0740   WBC  8.6   RBC  2.93*   HGB  7.9*   HCT  26.4*   MCV  90   MCH  27.0   MCHC  29.9*   RDW  15.6*   PLT  240     Recent Labs   Lab Test  07/18/18   0740  07/17/18   1613  07/17/18   0749   NA  140   --   139   POTASSIUM  4.5  4.1  3.2*   CHLORIDE  105   --   104   CO2  31   --   27   ANIONGAP  4   --   8   GLC  144*   --   99   BUN  45*   --   46*   CR  1.55*   --   1.71*   ANTOINETTE  8.6   --   9.0     Liver Function Studies -   Recent Labs   Lab Test  07/01/18   0621   PROTTOTAL  6.1*   ALBUMIN  3.1*   BILITOTAL  1.0   ALKPHOS  100   AST  20   ALT  26     Lab Results   Component Value Date    INR 1.09 07/06/2018    INR 1.30 07/03/2018    INR 1.40 07/02/2018    INR 1.52 07/02/2018    INR 1.01 06/29/2018    INR 1.12 08/08/2017    INR 1.04 05/18/2017    INR 1.06 11/18/2016    INR 1.02 10/27/2016    INR 1.19 12/19/2015    INR 1.08 05/05/2015    INR 1.4 05/04/2015    INR 0.97 01/28/2014       Naila Rose -[phone]  Diabetes Management job code [id number]            "

## 2018-07-18 NOTE — PROGRESS NOTES
[facility] Acute Rehabilitation Unit Progress Note    Patient Name: Mariel Ribeiro   YOB: 1945  MRN: [id number]     Age / Sex: 73 year old female    SUBJECTIVE/INTERVAL HX:    Patient was seen and examined at bedside. Reports feeling tired today and Hgb level was found to be 7.9. Hospitalist is now following in regard to increased creatinine and diuresis management. It was felt that she would benefit from a transfusion of pRBCs given her recent MI and CABG and is receiving 1 unit upon seeing the patient. The goal is to keep her Hgb between 8-10. She is scheduled to continue IV diuresis with Bumex with an additional Lasix dose after the transfusion as this will increase her volume intake. Hopefully her Cr will come to baseline so an ACE-I or ARB (patient has documented adverse reaction to ACE-I as it decreased GFR) can be started prior to discharge as her BP is still elevated at times, and this is likely a better medication given her medical conditions.    CURRENT MEDS:  Current Facility-Administered Medications   Medication     acetaminophen (TYLENOL) tablet 975 mg     albuterol (PROAIR HFA/PROVENTIL HFA/VENTOLIN HFA) Inhaler 1-2 puff     albuterol neb solution 2.5 mg     artificial saliva (BIOTENE MT) solution 1 spray     aspirin EC tablet 325 mg     atorvastatin (LIPITOR) tablet 40 mg     bumetanide (BUMEX) injection 3 mg     cholecalciferol (vitamin D3) capsule CAPS 10,000 Units     ciprofloxacin (CIPRO) tablet 250 mg     cyclobenzaprine (FLEXERIL) tablet 10 mg     glucose gel 15-30 g    Or     dextrose 50 % injection 25-50 mL    Or     glucagon injection 1 mg     EPINEPHrine (ADRENALIN) kit 0.3 mg     escitalopram (LEXAPRO) tablet 20 mg     febuxostat (ULORIC) tablet 40 mg     fluticasone-vilanterol (BREO ELLIPTA) 100-25 MCG/INH oral inhaler 1 puff     folic acid (FOLVITE) tablet 800 mcg     furosemide (LASIX) injection 40 mg     guaiFENesin (MUCINEX) 12 hr  tablet 600 mg     heparin sodium PF injection 5,000 Units     hydrALAZINE (APRESOLINE) tablet 75 mg     HYDROmorphone (DILAUDID) tablet 2 mg     insulin aspart (NovoLOG) inj (RAPID ACTING)     insulin aspart (NovoLOG) inj (RAPID ACTING)     insulin isophane human (HumuLIN N PEN) injection 30 Units     insulin isophane human (HumuLIN N PEN) injection 35 Units     ketamine (KETALAR) 5%-gabapentin (NEURONTIN) 8% topical PLO cream     levothyroxine (SYNTHROID/LEVOTHROID) tablet 150 mcg     Lidocaine (LIDOCARE) 4 % Patch 3 patch    And     lidocaine patch REMOVAL    And     lidocaine patch in PLACE     lidocaine (LMX4) cream     lidocaine 1 % 1 mL     melatonin tablet 3 mg     metoprolol tartrate (LOPRESSOR) tablet 75 mg     naloxone (NARCAN) injection 0.1-0.4 mg     niacin (SLO-NIACIN) CR tablet 500 mg     nitroGLYcerin (NITROSTAT) sublingual tablet 0.4 mg     pantoprazole (PROTONIX) EC tablet 40 mg     Patient is already receiving anticoagulation with heparin, enoxaparin (LOVENOX), warfarin (COUMADIN)  or other anticoagulant medication     polyethylene glycol (MIRALAX/GLYCOLAX) Packet 17 g     potassium chloride (KLOR-CON) Packet 20-40 mEq     potassium chloride 10 mEq in 100 mL intermittent infusion with 10 mg lidocaine     potassium chloride 10 mEq in 100 mL sterile water intermittent infusion (premix)     potassium chloride 20 mEq in 50 mL intermittent infusion     potassium chloride SA (K-DUR/KLOR-CON M) CR tablet 20-40 mEq     pregabalin (LYRICA) capsule 100 mg     repaglinide (PRANDIN) tablet 1 mg     senna-docusate (SENOKOT-S;PERICOLACE) 8.6-50 MG per tablet 1-2 tablet     sodium chloride (PF) 0.9% PF flush 10 mL     sodium chloride (PF) 0.9% PF flush 10-20 mL     sodium chloride 0.9 % infusion     traZODone (DESYREL) tablet 150 mg       PHYSICAL EXAM:  Vitals: B/P: 148/48, T: 96.9, P: 82, R: 18  Gen: tired, blood running in IV.  HEENT: PERRLA, EOMI, normal conjunctivae, moist mucosae, normal speech  Resp: CTAB.  Decreased air entry at bases.  CVS: RRR, no murmurs rubs gallops  GI: S NT ND normoactive bowel sounds  MSK: Spontaneously moving all four limbs  Neuro: AOx3. CN intact. Grossly intact strength and sensation.  Ext: bilateral edema wraps for lymphedema. RLE redness better. Improved pain.    LABS:  CBC RESULTS:   Recent Labs   Lab Test  07/18/18   0740   WBC  8.6   RBC  2.93*   HGB  7.9*   HCT  26.4*   MCV  90   MCH  27.0   MCHC  29.9*   RDW  15.6*   PLT  240     Last Basic Metabolic Panel:  Lab Results   Component Value Date     07/18/2018      Lab Results   Component Value Date    POTASSIUM 4.5 07/18/2018     Lab Results   Component Value Date    CHLORIDE 105 07/18/2018     Lab Results   Component Value Date    ANTOINETTE 8.6 07/18/2018     Lab Results   Component Value Date    CO2 31 07/18/2018     Lab Results   Component Value Date    BUN 45 07/18/2018     Lab Results   Component Value Date    CR 1.55 07/18/2018     Lab Results   Component Value Date     07/18/2018       ASSESSMENT:  Mariel Ribeiro is a 73 year old female with a past medical history of DM2, HTN, CKD3, HLD, COPD, fibromyalgia, anxiety, depression, lymphedema, thyroid cancer s/p resection, and multivessel CAD who presented with with unstable angina. Patient had recurrent chest pain and her CABG was moved to 7/2/2018. On 7/2/2018 she underwent a 3 vessel CABG (LIMA to LAD, SVG to R PDA, and SVG to OMA2).      Rehabilitation - continue comprehensive acute inpatient rehabilitation program with multidisciplinary approach including therapies, rehab nursing, and physiatry following. See interval history for updates. Patient participating well. Transfusion interfering with therapies today.   Medical Conditions  #Coronary artery disease - s/p CABG x3  Patient with significant history of CAD in self and family. She has already received 2 stents in 2007. She is presenting with impairment in functional mobility and tasks. 3 vessel CABG performed to LAD,  PDA and OMA2.  -Continue sternal precautions.  -Continue metoprolol 75 BID.  -Continue hydralazine 75 TID.  -Bumex 3 mg IV continued. Lasix after transfusion.  - mg daily.  -Atorvastatin 40 mg daily.  -Tylenol 975 mg TID  -NTG PRN for angina.  -Hydromorphone 2 mg q8hrs PO PRN. Allergic to oxycodone.  -Hgb 7.9. Receiving transfusion.      #Left shoulder pain  Likely acute on chronic as she had rotator cuff repair in the distant past on this shoulder. Also, could be worsened from surgical positioning and referred pain.   -Continue APAP, lidocaine patch and cream and light ROM exercises.  -No joint injection at present due to DM and need for fluoroscopy.  -Consider outpatient orthopedics consultation.      #Chronic diastolic heart failure  Patient required IV diuresis in the acute hospital, on PO thus far, but will trial IV bumex x2.  -IV bumex. IV lasix after transfusion finished.  -Maintain good BP control (metoprolol and hydralazine ordered). Likely will go home with ACE-I or ARB (stopped in hospital due to increased Cr).  -EF of 55% intraoperatively.  -Medicine consult. Appreciate recs.    #Normocytic anemia:  -Desired Hgb range 8-10. 7.9 this AM. 1 unit pRBCs ordered.  -Follow up Hgb tomorrow AM.      #COPD  -Albuterol nebs and inhaler PRN for severe and mild/moderate SOB, respectively.  -Fluticasone-vilanterol 1 puff daily.  -O2 PRN via NC. Sats well on RA now.  -CPAP at night as per RT.      #Hypothyroidism  Distant history of thyroid resection for thyroid cancer (in 1990s) with resultant hypothyroidism. Continue levothyroxine.  -Levothyroxine 150 mcg daily.      #Hypertension  -Continue metoprolol 75 BID.  -Continue hydralazine 75 TID. Will re-evaluate efficacy of this. BP tends to be labile. Suspecting this may be causing this.  -Bumex   -Likely will start ACE-I or ARB prior to discharge if Cr at baseline. Cr above baseline 1.3-1.4. Cr 1.5 today.      #Dyslipidemia  -Atorvastatin 40 mg daily.  -Niacin  500 mg BID.      #Type II diabetes mellitus  Patient with hard to control diabetes both inpatient and outpatient. A1C over 9 PTA. Endocrine consulted and we appreciate recs.  -Continue QID fingersticks.  -High correction dose scale ACTID and QHS.  -Prandin TID replacing CHO counting.  -Insulin isophane human 35 units in the AM and 30 units at PM.  -Consistent CHO diet.  -Lyrica 100 mg BID for neuropathy (and fibromyalgia).      #Chronic kidney disease stage III  -Will check BMP in the AM tomorrow. Cr 1.5 today. Baseline (Cr 1.3-1.4).  -Continue IV bumex. Will need IV lasix dose after transfusion.   -PO fluids max 2 - 2.5 L.      #Urinary tract infection, Enterobacter cloacae complex and E.coli  -Continue ciprofloxacin 250 mg BID. Cultures sensitive. Will follow. Last day 7/20.      #Paroxysmal SVT  -Continue metoprolol 75 BID.  -Obtain EKG if symptomatic.      #Gout, hyperuricemia  -Continue febuxostat 40 mg QHS      #Lymphedema  Patient reports this is worse since surgery. This started in 2007 after her two PCIs for CAD.   -Bilateral wraps in place.  -Patient had hematoma 2/2 graft site incision. Pain and redness is improving. No concern for cellulitis at this time. Will monitor.        Adjustment to disability:  Clinical psychology as needed. Continue lexapro 20 mg daily and trazodone 150 mg QHS. Melatonin at night.  FEN: Consistent CHO, thin liquids. Trend Cr. BMP tomorrow AM. Potassium supplement. PICC in right arm.  Bowel: Continent. Assistance with cares at times.  Bladder: WFL. UTI on cipro. Last day 7/20.  DVT prophylaxis: Heparin 5000 units TID  GI prophylaxis: Pantoprazole  Code: Full code  Disposition: Likely home with outpatient cardiopulmonary rehab services.  Estimated length of stay:  7-10 days.  Rehab prognosis:  Fair  Follow up appointments on discharge: 1) CT surgery 7/20 2) PCP within 2 weeks of d/c.    Rafi Crawford MD  PGY-2 PM&R  Pager: [phone]

## 2018-07-18 NOTE — PLAN OF CARE
Alert and oriented x 4, able to make needs known. Mod I in her room using cane. Continent of B & B, large BM at HS. C/o  Incisional/sternal pain, comfortably manageable with scheduled Pain cream, no PRN given/requested. Takes meds whole, bigger meds with apple sauce. BLE lymphydema wraps in place. K+ recheck at 1600 4.1.  & 219. Call within reach.

## 2018-07-18 NOTE — PLAN OF CARE
Problem: Goal/Outcome  Goal: Goal Outcome Summary  FOCUS/GOAL  Bladder management, Medication management and Wound care management    ASSESSMENT, INTERVENTIONS AND CONTINUING PLAN FOR GOAL:  Pt a&ox4. Calls appropriately. Complains of sternal incision pain, controlled with scheduled Tylenol & Ketamine cream. Mod I in room using cane-however while on IV-calling for assistance. Pt hgb 7.9-receiving 1 unit RBCs over 4 hours. Pt appears to be tolerating this well. Denies SOB, Chest pain or any difficulty with breathing. Rewrapped lymphedema wraps this shift. Continent of b&b on the toilet in the br-needed assistance with pericares after BM. Continues on ss insulin-needed coverage at both breakfast and lunch.

## 2018-07-18 NOTE — PLAN OF CARE
Problem: Goal/Outcome  Goal: Goal Outcome Summary  OT:-30 min OT due to blood transfusion scheduled 1-5pm. Plan to resume therapies tomorrow.

## 2018-07-18 NOTE — PLAN OF CARE
Problem: Patient Care Overview  Goal: Plan of Care/Patient Progress Review  OT: Pt continues to be limited by fatigue.  Also had episode of dizziness and lightheaded, needing to rest.  Pt also noting increased pain at incision.  Returned to room via w/c, RN notified.

## 2018-07-18 NOTE — PROGRESS NOTES
"[facility], [place]    Hospitalist Progress Note      Assessment & Plan   Mariel Ribeiro is a 73 year old female who was admitted on 7/13/2018. I was asked to see the patient for management of diuretics and pain.     1. CKD - serum creatinine has improved with diuresis, suggesting cardiorenal syndrome (type 3) is most likely etiology.   - continue daily weights  - continue 3mg IV bumex BID for today, consider transition to PO 3mg bumex tomorrow  - trend BMP  - will refrain from SPENSER w/u due to clinical improvement    2. Acute normocytic blood loss anemia - likely 2/2 recent surgery but continues to trend downward, cannot exclude extravasation into RLE hematoma. Given recent MI, beneficial to keep Hgb above 8. Of note, given recent volume overload issues, will give extra lasix following blood transfusion  - transfuse 1 unit of pRBCs  - extra 40mg of lasix to follow completion of transfusion     3. R lower extremity pain  - continue ketamine/gabapentin/lidocaine cream TID     4. Sternal and L chest pain  - continue ketamine/gabapentin/lidocaine cream TID     DVT Prophylaxis: Heparin SQ  Code Status: Full Code     Disposition: Expected discharge in 4-7 days once able to progress to modified independent mobility.     Patient was discussed with Dr. Yana Camargo MD, PhD  Internal Medicine PGY-3  Click to text page [phone]    Interval History   No acute events overnight. Care team notes last 24 hours reviewed. Participated appropriately in therapy, although pt endorses feeling \"worn out\". Sternal pain has improved somewhat.     -Data reviewed today: I reviewed all new labs and imaging results over the last 24 hours. I personally reviewed no images or EKG's today.    Physical Exam   Temp: 97.6  F (36.4  C) Temp src: Oral BP: 164/52 Pulse: 88 Heart Rate: 82 Resp: 17 SpO2: 94 % O2 Device: None (Room air)    Vitals:    07/13/18 1413 07/16/18 0641 07/17/18 0611   Weight: 141.1 kg " (311 lb) 139.2 kg (306 lb 14.4 oz) 139.8 kg (308 lb 3.2 oz)     Vital Signs with Ranges  Temp:  [95.6  F (35.3  C)-97.6  F (36.4  C)] 97.6  F (36.4  C)  Pulse:  [68-88] 88  Heart Rate:  [82] 82  Resp:  [16-17] 17  BP: (121-164)/(45-58) 164/52  SpO2:  [94 %-95 %] 94 %  I/O last 3 completed shifts:  In: 360 [P.O.:340; I.V.:20]  Out: -     Constitutional: NAD  Respiratory: normal breath sounds, occasional crackles at bilateral bases  Cardiovascular: S1/S2 heard, no murmurs  GI: nontender, mildly distended, no palpable organomegaly  Skin/Integumen: sternal incision c/d/i  Other: lymphedema wraps in place     Medications     - MEDICATION INSTRUCTIONS -         acetaminophen  975 mg Oral TID     aspirin  325 mg Oral Daily     atorvastatin  40 mg Oral Daily     bumetanide  3 mg Intravenous BID AC     cholecalciferol  10,000 Units Oral Daily     ciprofloxacin  250 mg Oral Q12H     escitalopram  20 mg Oral QAM     febuxostat  40 mg Oral QPM     fluticasone-vilanterol  1 puff Inhalation Daily     folic acid  800 mcg Oral Daily     guaiFENesin  600 mg Oral BID     heparin  5,000 Units Subcutaneous Q8H     hydrALAZINE  75 mg Oral TID     insulin aspart  1-10 Units Subcutaneous TID AC     insulin aspart  1-7 Units Subcutaneous At Bedtime     insulin isophane human  30 Units Subcutaneous Q24H     insulin isophane human  35 Units Subcutaneous Q24H     ketamine (KETALAR) 5%-gabapentin (NEURONTIN) 8%   Topical TID     levothyroxine  150 mcg Oral Daily     lidocaine  3 patch Transdermal Q24h    And     lidocaine   Transdermal Q24H    And     lidocaine   Transdermal Q8H     melatonin  3 mg Oral At Bedtime     metoprolol tartrate  75 mg Oral BID     niacin  500 mg Oral BID     pantoprazole  40 mg Oral QAM AC     polyethylene glycol  17 g Oral Daily     pregabalin  100 mg Oral BID     repaglinide  1 mg Oral TID AC     sodium chloride (PF)  10 mL Intracatheter Q7 Days     traZODone  150 mg Oral At Bedtime       Data     Recent Labs  Lab  07/18/18  0740 07/17/18  1613 07/17/18  0749 07/16/18  0606  07/14/18  0700   WBC 8.6  --  10.8  --   --  8.9   HGB 7.9*  --  8.6*  --   --  8.0*   MCV 90  --  89  --   --  93     --  235  --   --  248     --  139 138  < > 142   POTASSIUM 4.5 4.1 3.2* 3.7  < > 6.0*   CHLORIDE 105  --  104 101  < > 103   CO2 31  --  27 29  < > 31   BUN 45*  --  46* 42*  < > 36*   CR 1.55*  --  1.71* 1.53*  < > 1.42*   ANIONGAP 4  --  8 8  < > 8   ANTOINETTE 8.6  --  9.0 8.9  < > 9.0   *  --  99 171*  < > 156*   < > = values in this interval not displayed.    No results found for this or any previous visit (from the past 24 hour(s)).

## 2018-07-19 LAB
ANION GAP SERPL CALCULATED.3IONS-SCNC: 6 MMOL/L (ref 3–14)
BUN SERPL-MCNC: 43 MG/DL (ref 7–30)
CALCIUM SERPL-MCNC: 8.6 MG/DL (ref 8.5–10.1)
CHLORIDE SERPL-SCNC: 105 MMOL/L (ref 94–109)
CO2 SERPL-SCNC: 29 MMOL/L (ref 20–32)
CREAT SERPL-MCNC: 1.49 MG/DL (ref 0.52–1.04)
GFR SERPL CREATININE-BSD FRML MDRD: 34 ML/MIN/1.7M2
GLUCOSE BLDC GLUCOMTR-MCNC: 120 MG/DL (ref 70–99)
GLUCOSE BLDC GLUCOMTR-MCNC: 120 MG/DL (ref 70–99)
GLUCOSE BLDC GLUCOMTR-MCNC: 129 MG/DL (ref 70–99)
GLUCOSE BLDC GLUCOMTR-MCNC: 158 MG/DL (ref 70–99)
GLUCOSE SERPL-MCNC: 112 MG/DL (ref 70–99)
HCT VFR BLD AUTO: 29.7 % (ref 35–47)
HGB BLD-MCNC: 9.3 G/DL (ref 11.7–15.7)
POTASSIUM SERPL-SCNC: 4.1 MMOL/L (ref 3.4–5.3)
SODIUM SERPL-SCNC: 140 MMOL/L (ref 133–144)

## 2018-07-19 PROCEDURE — A9270 NON-COVERED ITEM OR SERVICE: HCPCS | Mod: GY | Performed by: PHYSICAL MEDICINE & REHABILITATION

## 2018-07-19 PROCEDURE — 25000132 ZZH RX MED GY IP 250 OP 250 PS 637: Mod: GY | Performed by: PHYSICAL MEDICINE & REHABILITATION

## 2018-07-19 PROCEDURE — 85014 HEMATOCRIT: CPT | Performed by: INTERNAL MEDICINE

## 2018-07-19 PROCEDURE — 40000193 ZZH STATISTIC PT WARD VISIT

## 2018-07-19 PROCEDURE — 25000132 ZZH RX MED GY IP 250 OP 250 PS 637: Mod: GY | Performed by: GENERAL PRACTICE

## 2018-07-19 PROCEDURE — 36592 COLLECT BLOOD FROM PICC: CPT | Performed by: INTERNAL MEDICINE

## 2018-07-19 PROCEDURE — 25000132 ZZH RX MED GY IP 250 OP 250 PS 637: Mod: GY | Performed by: PHYSICIAN ASSISTANT

## 2018-07-19 PROCEDURE — 97110 THERAPEUTIC EXERCISES: CPT | Mod: GP

## 2018-07-19 PROCEDURE — 99232 SBSQ HOSP IP/OBS MODERATE 35: CPT | Performed by: INTERNAL MEDICINE

## 2018-07-19 PROCEDURE — 25000128 H RX IP 250 OP 636: Performed by: STUDENT IN AN ORGANIZED HEALTH CARE EDUCATION/TRAINING PROGRAM

## 2018-07-19 PROCEDURE — 25000125 ZZHC RX 250: Performed by: STUDENT IN AN ORGANIZED HEALTH CARE EDUCATION/TRAINING PROGRAM

## 2018-07-19 PROCEDURE — A9270 NON-COVERED ITEM OR SERVICE: HCPCS | Mod: GY | Performed by: STUDENT IN AN ORGANIZED HEALTH CARE EDUCATION/TRAINING PROGRAM

## 2018-07-19 PROCEDURE — 12800006 ZZH R&B REHAB

## 2018-07-19 PROCEDURE — 97535 SELF CARE MNGMENT TRAINING: CPT | Mod: GO

## 2018-07-19 PROCEDURE — 25000132 ZZH RX MED GY IP 250 OP 250 PS 637: Mod: GY | Performed by: STUDENT IN AN ORGANIZED HEALTH CARE EDUCATION/TRAINING PROGRAM

## 2018-07-19 PROCEDURE — 40000133 ZZH STATISTIC OT WARD VISIT: Performed by: OCCUPATIONAL THERAPIST

## 2018-07-19 PROCEDURE — 97530 THERAPEUTIC ACTIVITIES: CPT | Mod: GO | Performed by: OCCUPATIONAL THERAPIST

## 2018-07-19 PROCEDURE — 40000133 ZZH STATISTIC OT WARD VISIT

## 2018-07-19 PROCEDURE — 00000146 ZZHCL STATISTIC GLUCOSE BY METER IP

## 2018-07-19 PROCEDURE — 80048 BASIC METABOLIC PNL TOTAL CA: CPT | Performed by: INTERNAL MEDICINE

## 2018-07-19 PROCEDURE — A9270 NON-COVERED ITEM OR SERVICE: HCPCS | Mod: GY | Performed by: GENERAL PRACTICE

## 2018-07-19 PROCEDURE — 85018 HEMOGLOBIN: CPT | Performed by: INTERNAL MEDICINE

## 2018-07-19 PROCEDURE — A9270 NON-COVERED ITEM OR SERVICE: HCPCS | Mod: GY | Performed by: PHYSICIAN ASSISTANT

## 2018-07-19 PROCEDURE — 97530 THERAPEUTIC ACTIVITIES: CPT | Mod: GP

## 2018-07-19 RX ORDER — MINERAL OIL/HYDROPHIL PETROLAT
OINTMENT (GRAM) TOPICAL
Status: DISCONTINUED | OUTPATIENT
Start: 2018-07-19 | End: 2018-07-23 | Stop reason: HOSPADM

## 2018-07-19 RX ADMIN — INSULIN HUMAN 35 UNITS: 100 INJECTION, SUSPENSION SUBCUTANEOUS at 08:16

## 2018-07-19 RX ADMIN — NIACIN ER 500 MG TABLET,EXTENDED RELEASE 500 MG: at 21:58

## 2018-07-19 RX ADMIN — Medication 3 MG: at 21:59

## 2018-07-19 RX ADMIN — PANTOPRAZOLE SODIUM 40 MG: 40 TABLET, DELAYED RELEASE ORAL at 06:35

## 2018-07-19 RX ADMIN — PREGABALIN 100 MG: 100 CAPSULE ORAL at 21:17

## 2018-07-19 RX ADMIN — Medication: at 10:48

## 2018-07-19 RX ADMIN — TRAZODONE HYDROCHLORIDE 150 MG: 50 TABLET ORAL at 21:59

## 2018-07-19 RX ADMIN — CHOLECALCIFEROL CAP 125 MCG (5000 UNIT) 10000 UNITS: 125 CAP at 07:58

## 2018-07-19 RX ADMIN — ATORVASTATIN CALCIUM 40 MG: 40 TABLET, FILM COATED ORAL at 21:15

## 2018-07-19 RX ADMIN — ACETAMINOPHEN 975 MG: 325 TABLET, FILM COATED ORAL at 06:47

## 2018-07-19 RX ADMIN — Medication: at 14:26

## 2018-07-19 RX ADMIN — GUAIFENESIN 600 MG: 600 TABLET, EXTENDED RELEASE ORAL at 21:16

## 2018-07-19 RX ADMIN — BUMETANIDE 3 MG: 0.25 INJECTION INTRAMUSCULAR; INTRAVENOUS at 17:34

## 2018-07-19 RX ADMIN — ASPIRIN 325 MG: 325 TABLET, DELAYED RELEASE ORAL at 08:15

## 2018-07-19 RX ADMIN — ACETAMINOPHEN 800 MCG: 400 TABLET ORAL at 07:58

## 2018-07-19 RX ADMIN — GUAIFENESIN 600 MG: 600 TABLET, EXTENDED RELEASE ORAL at 08:01

## 2018-07-19 RX ADMIN — FEBUXOSTAT 40 MG: 40 TABLET ORAL at 21:59

## 2018-07-19 RX ADMIN — LIDOCAINE 3 PATCH: 560 PATCH PERCUTANEOUS; TOPICAL; TRANSDERMAL at 21:21

## 2018-07-19 RX ADMIN — ACETAMINOPHEN 975 MG: 325 TABLET, FILM COATED ORAL at 21:14

## 2018-07-19 RX ADMIN — ESCITALOPRAM OXALATE 20 MG: 20 TABLET ORAL at 08:01

## 2018-07-19 RX ADMIN — PREGABALIN 100 MG: 100 CAPSULE ORAL at 08:15

## 2018-07-19 RX ADMIN — Medication: at 21:20

## 2018-07-19 RX ADMIN — HYDRALAZINE HYDROCHLORIDE 75 MG: 50 TABLET ORAL at 07:57

## 2018-07-19 RX ADMIN — HEPARIN SODIUM 5000 UNITS: 5000 INJECTION, SOLUTION INTRAVENOUS; SUBCUTANEOUS at 06:53

## 2018-07-19 RX ADMIN — HYDRALAZINE HYDROCHLORIDE 75 MG: 50 TABLET ORAL at 14:27

## 2018-07-19 RX ADMIN — REPAGLINIDE 1 MG: 1 TABLET ORAL at 11:53

## 2018-07-19 RX ADMIN — LEVOTHYROXINE SODIUM 150 MCG: 25 TABLET ORAL at 07:57

## 2018-07-19 RX ADMIN — FLUTICASONE FUROATE AND VILANTEROL TRIFENATATE 1 PUFF: 100; 25 POWDER RESPIRATORY (INHALATION) at 08:01

## 2018-07-19 RX ADMIN — CIPROFLOXACIN HYDROCHLORIDE 250 MG: 250 TABLET, FILM COATED ORAL at 07:58

## 2018-07-19 RX ADMIN — REPAGLINIDE 1 MG: 1 TABLET ORAL at 06:53

## 2018-07-19 RX ADMIN — BUMETANIDE 3 MG: 0.25 INJECTION INTRAMUSCULAR; INTRAVENOUS at 08:03

## 2018-07-19 RX ADMIN — METOPROLOL TARTRATE 75 MG: 25 TABLET, FILM COATED ORAL at 21:58

## 2018-07-19 RX ADMIN — ACETAMINOPHEN 975 MG: 325 TABLET, FILM COATED ORAL at 14:28

## 2018-07-19 RX ADMIN — HYDRALAZINE HYDROCHLORIDE 75 MG: 50 TABLET ORAL at 21:17

## 2018-07-19 RX ADMIN — INSULIN HUMAN 30 UNITS: 100 INJECTION, SUSPENSION SUBCUTANEOUS at 21:59

## 2018-07-19 RX ADMIN — CIPROFLOXACIN HYDROCHLORIDE 250 MG: 250 TABLET, FILM COATED ORAL at 21:14

## 2018-07-19 RX ADMIN — INSULIN ASPART 1 UNITS: 100 INJECTION, SOLUTION INTRAVENOUS; SUBCUTANEOUS at 11:56

## 2018-07-19 RX ADMIN — METOPROLOL TARTRATE 75 MG: 25 TABLET, FILM COATED ORAL at 08:02

## 2018-07-19 RX ADMIN — NIACIN ER 500 MG TABLET,EXTENDED RELEASE 500 MG: at 08:02

## 2018-07-19 RX ADMIN — REPAGLINIDE 1 MG: 1 TABLET ORAL at 17:46

## 2018-07-19 NOTE — PLAN OF CARE
Problem: Goal/Outcome  Goal: Goal Outcome Summary  RN: potassium level 4.1, no replacement needed.   Pain controlled with scheduled meds sternal area.  Discharge delay r/t fluid status, see MD notes.  Independent in room, unable to wipe after bm, had medium soft.

## 2018-07-19 NOTE — PLAN OF CARE
Problem: Patient Care Overview  Goal: Plan of Care/Patient Progress Review  Occupational Therapy Discharge Summary    Reason for therapy discharge:    All goals and outcomes met, no further needs identified.    Progress towards therapy goal(s). See goals on Care Plan in [facility] electronic health record for goal details.  Goals met    Therapy recommendation(s):    No additional OT recommended at this time unless there is a functional decline. Pt is mod I using SPC/rollator for basic adls but still unable to don shoes/socks or complete ralph cares after bowel movement and pt stated that she will have roommate complete the task once she is home. Roommate will also provide supervision for shower transfer using grab bar. Pt has all equipment for home and will have cardiac rehab outpatient.

## 2018-07-19 NOTE — PLAN OF CARE
Problem: Patient Care Overview  Goal: Plan of Care/Patient Progress Review  -30 min in PM d/t pt refusal re: frustrations over BLE pain from lymphedema and frustrations with care team. Discussed possibility of having lymphedema team see pt before she returns home - paged Janeen from lymph team. May be able to have Sherrie see her tomorrow pending her schedule in clinic.    Will continue with more BLE exercises and light MLD, when able, to help reduce size of BLE.

## 2018-07-19 NOTE — PLAN OF CARE
Problem: Goal/Outcome  Goal: Goal Outcome Summary  FOCUS/GOAL  Bowel management, Bladder management, Medication management, Pain management, Medical management, Mobility and Cognition/Memory/Judgment/Problem solving    ASSESSMENT, INTERVENTIONS AND CONTINUING PLAN FOR GOAL:  Pt is alert and oriented. Continent of bladder, voiding without difficulty. No BM this shift. Denied pain or discomfort this shift. Medications taken whole, one at a time with water. Blood glucose at 0200 was 129. Pt up Mod I in room. Pt slept well in the bed wearing CPAP throughout the night. Uses call light appropriately, able to make needs known. Will continue with POC.

## 2018-07-19 NOTE — PROGRESS NOTES
[facility] Acute Rehabilitation Unit Progress Note    Patient Name: Mariel Ribeiro   YOB: 1945  MRN: [id number]     Age / Sex: 73 year old female    SUBJECTIVE/INTERVAL HX:    Patient was seen and examined at bedside. Mariel reports feeling relatively well this morning.  She has chronic left shoulder pain using heat modality this morning to mild to moderate effect.  She had expressed some concerns with going home on Friday.  Once it was explained that she has been progressing well in therapies in order to transition safely to her home environment she was okay with this plan.  Her blood sugars have been controlled since starting Prandin.  She received 1 unit packed red blood cells yesterday and her hemoglobin this morning shows improvement and is 9.3 (it was 7.9 yesterday).  Her creatinine this morning has improved as well.  It is 1.49.  Will defer to hospitalist team for recommendations regarding further IV diuresis.  Plan is still to go home on 7/20 with likely outpatient cardiac rehab.    CURRENT MEDS:  Current Facility-Administered Medications   Medication     acetaminophen (TYLENOL) tablet 975 mg     albuterol (PROAIR HFA/PROVENTIL HFA/VENTOLIN HFA) Inhaler 1-2 puff     albuterol neb solution 2.5 mg     artificial saliva (BIOTENE MT) solution 1 spray     aspirin EC tablet 325 mg     atorvastatin (LIPITOR) tablet 40 mg     bumetanide (BUMEX) injection 3 mg     cholecalciferol (vitamin D3) capsule CAPS 10,000 Units     ciprofloxacin (CIPRO) tablet 250 mg     cyclobenzaprine (FLEXERIL) tablet 10 mg     glucose gel 15-30 g    Or     dextrose 50 % injection 25-50 mL    Or     glucagon injection 1 mg     EPINEPHrine (ADRENALIN) kit 0.3 mg     escitalopram (LEXAPRO) tablet 20 mg     febuxostat (ULORIC) tablet 40 mg     fluticasone-vilanterol (BREO ELLIPTA) 100-25 MCG/INH oral inhaler 1 puff     folic acid (FOLVITE) tablet 800 mcg     guaiFENesin (MUCINEX) 12 hr tablet  600 mg     heparin sodium PF injection 5,000 Units     hydrALAZINE (APRESOLINE) tablet 75 mg     HYDROmorphone (DILAUDID) tablet 2 mg     insulin aspart (NovoLOG) inj (RAPID ACTING)     insulin aspart (NovoLOG) inj (RAPID ACTING)     insulin isophane human (HumuLIN N PEN) injection 30 Units     insulin isophane human (HumuLIN N PEN) injection 35 Units     ketamine (KETALAR) 5%-gabapentin (NEURONTIN) 8% topical PLO cream     levothyroxine (SYNTHROID/LEVOTHROID) tablet 150 mcg     Lidocaine (LIDOCARE) 4 % Patch 3 patch    And     lidocaine patch REMOVAL    And     lidocaine patch in PLACE     lidocaine (LMX4) cream     lidocaine 1 % 1 mL     melatonin tablet 3 mg     metoprolol tartrate (LOPRESSOR) tablet 75 mg     naloxone (NARCAN) injection 0.1-0.4 mg     niacin (SLO-NIACIN) CR tablet 500 mg     nitroGLYcerin (NITROSTAT) sublingual tablet 0.4 mg     pantoprazole (PROTONIX) EC tablet 40 mg     Patient is already receiving anticoagulation with heparin, enoxaparin (LOVENOX), warfarin (COUMADIN)  or other anticoagulant medication     polyethylene glycol (MIRALAX/GLYCOLAX) Packet 17 g     potassium chloride (KLOR-CON) Packet 20-40 mEq     potassium chloride 10 mEq in 100 mL intermittent infusion with 10 mg lidocaine     potassium chloride 10 mEq in 100 mL sterile water intermittent infusion (premix)     potassium chloride 20 mEq in 50 mL intermittent infusion     potassium chloride SA (K-DUR/KLOR-CON M) CR tablet 20-40 mEq     pregabalin (LYRICA) capsule 100 mg     repaglinide (PRANDIN) tablet 1 mg     senna-docusate (SENOKOT-S;PERICOLACE) 8.6-50 MG per tablet 1-2 tablet     sodium chloride (PF) 0.9% PF flush 10 mL     sodium chloride (PF) 0.9% PF flush 10-20 mL     traZODone (DESYREL) tablet 150 mg       PHYSICAL EXAM:  Vitals: B/P: 151/60, T: 97[post blood[, P: 86, R: 18  Gen: tired, sitting on couch with heat pad on left shoulder.  HEENT: PERRLA, EOMI, normal conjunctivae, moist mucosae, normal speech  Resp: CTAB.  Diminished air entry at bases bilaterally.  CVS: RRR, no murmurs rubs gallops  GI: S NT ND normoactive bowel sounds  MSK: Spontaneously moving all four limbs. Left shoulder ROM limited by pain.  Neuro: AOx3. Grossly intact strength and sensation. Diffuse weakness c/w deconditioning.  Ext: extremity pulses 2+, skin warm and well-perfused. Lymphedema wraps on leg. Sternotomy appears c/d/i.    LABS:  CBC RESULTS:   Recent Labs   Lab Test  07/19/18   0709  07/18/18   0740   WBC   --   8.6   RBC   --   2.93*   HGB  9.3*  7.9*   HCT  29.7*  26.4*   MCV   --   90   MCH   --   27.0   MCHC   --   29.9*   RDW   --   15.6*   PLT   --   240     Last Basic Metabolic Panel:  Lab Results   Component Value Date     07/19/2018      Lab Results   Component Value Date    POTASSIUM 4.1 07/19/2018     Lab Results   Component Value Date    CHLORIDE 105 07/19/2018     Lab Results   Component Value Date    ANTOINETTE 8.6 07/19/2018     Lab Results   Component Value Date    CO2 29 07/19/2018     Lab Results   Component Value Date    BUN 43 07/19/2018     Lab Results   Component Value Date    CR 1.49 07/19/2018     Lab Results   Component Value Date     07/19/2018       ASSESSMENT:  Mariel Ribeiro is a 73 year old female with a past medical history of DM2, HTN, CKD3, HLD, COPD, fibromyalgia, anxiety, depression, lymphedema, thyroid cancer s/p resection, and multivessel CAD who presented with with unstable angina. Patient had recurrent chest pain and her CABG was moved to 7/2/2018. On 7/2/2018 she underwent a 3 vessel CABG (LIMA to LAD, SVG to R PDA, and SVG to OMA2).      Rehabilitation - continue comprehensive acute inpatient rehabilitation program with multidisciplinary approach including therapies, rehab nursing, and physiatry following. See interval history for updates. Patient participating reasonably well, but transfusion interrupted therapy yesterday.   Medical Conditions  #Coronary artery disease - s/p CABG x3  Patient with  significant history of CAD in self and family. She has already received 2 stents in 2007. She is presenting with impairment in functional mobility and tasks. 3 vessel CABG performed to LAD, PDA and OMA2.  -Continue sternal precautions.  -Continue metoprolol 75 BID.  -Continue hydralazine 75 TID.  -Bumex 3 mg IV continued as per hospitalist.  - mg daily.  -Atorvastatin 40 mg daily.  -Tylenol 975 mg TID  -NTG PRN for angina.  -Hydromorphone 2 mg q8hrs PO PRN. Allergic to oxycodone.  -Hgb improved to 9.3.      #Left shoulder pain  Likely acute on chronic as she had rotator cuff repair in the distant past on this shoulder. Also, could be worsened from surgical positioning and referred pain.   -Continue APAP, lidocaine patch and cream and light ROM exercises.  -No joint injection at present due to DM and need for fluoroscopy.  -Consider outpatient orthopedics consultation.      #Chronic diastolic heart failure  Patient required IV diuresis in the acute hospital, on PO thus far, but will trial IV bumex x2.  -IV bumex.  -Maintain good BP control (metoprolol and hydralazine ordered). Likely will go home with ACE-I or ARB (stopped in hospital due to increased Cr).  -EF of 55% intraoperatively.  -Medicine consult. Appreciate recs.  -Cr 1.49 likely due to increased fluids from pRBCs. Weight up 2 kg from 2 days ago.     #Normocytic anemia:  -Desired Hgb range 8-10. 9.3 this AM. 1 unit pRBCs transfused on 7/18/2018.      #COPD  -Albuterol nebs and inhaler PRN for severe and mild/moderate SOB, respectively.  -Fluticasone-vilanterol 1 puff daily.  -O2 PRN via NC. Sats well on RA now.  -CPAP at night as per RT.      #Hypothyroidism  Distant history of thyroid resection for thyroid cancer (in 1990s) with resultant hypothyroidism. Continue levothyroxine.  -Levothyroxine 150 mcg daily.      #Hypertension  -Continue metoprolol 75 BID.  -Continue hydralazine 75 TID. Will re-evaluate efficacy of this. BP tends to be labile.  Suspecting this may be causing this. Skipped 1 dose overnight due to low diastolic BP.  -Bumex.  -Likely will start ACE-I or ARB prior to discharge if Cr at baseline. Cr above baseline 1.3-1.4. Cr 1.49 today.      #Dyslipidemia  -Atorvastatin 40 mg daily.  -Niacin 500 mg BID.      #Type II diabetes mellitus  Patient with hard to control diabetes both inpatient and outpatient. A1C over 9 PTA. Endocrine consulted and we appreciate recs. Possibly will f/u with endocrine as outpatient for further DM mgmt.  -Continue QID fingersticks.  -High correction dose scale ACTID and QHS.  -Prandin TID replacing CHO counting.  -Insulin isophane human 35 units in the AM and 30 units at PM.  -Consistent CHO diet.  -Lyrica 100 mg BID for neuropathy (and fibromyalgia).      #Chronic kidney disease stage III  -Cr 1.49 today. Baseline (Cr 1.3-1.4). Likely plateau due to pRBCs given.  -Continue IV bumex as per hospitalist.  -PO fluids max 2 L.      #Urinary tract infection, Enterobacter cloacae complex and E.coli  -Continue ciprofloxacin 250 mg BID. Cultures sensitive. Will follow. Last day 7/20.      #Paroxysmal SVT  -Continue metoprolol 75 BID.  -Obtain EKG if symptomatic.      #Gout, hyperuricemia  -Continue febuxostat 40 mg QHS      #Lymphedema  Patient reports this is worse since surgery. This started in 2007 after her two PCIs for CAD.   -Bilateral wraps in place this AM.  -Patient had hematoma 2/2 graft site incision. Pain and redness is improving as per patient this AM. No concern for cellulitis at this time. Will monitor.      Adjustment to disability:  Clinical psychology as needed. Continue lexapro 20 mg daily and trazodone 150 mg QHS. Melatonin at night.  FEN: Consistent CHO, thin liquids. Cr stable, and K stable. PICC in right arm.   Bowel: Continent. Assistance with cares at times.  Bladder: WFL. UTI on cipro. Last day 7/20.  DVT prophylaxis: Heparin 5000 units TID. Likely can discontinue prior to discharge as patient  ambulates more.  GI prophylaxis: Pantoprazole  Code: Full code  Disposition: Likely home with outpatient cardiopulmonary rehab services.  Estimated length of stay:  7/20  Rehab prognosis:  Fair  Follow up appointments on discharge: 1) CT surgery 7/20 2) PCP within 2 weeks of discharge 3) Dr. Stephanie Wolf for heart failure follow-up on 8/15/2018 4) Outpatient orthopedics referral for left shoulder pain 5) Endocrine clinic referral for DM mgmt.    Rafi Crawford MD  PGY-2 PM&R  Pager: [phone]

## 2018-07-19 NOTE — PROGRESS NOTES
"  No new issues  No cp   No sob   No fever   No chills  No nausea  No vomiting   No loc     Vital signs:  Temp: 97  F (36.1  C) (post blood) Temp src: Axillary (had just drank cold water) BP: 132/53 Pulse: 78 Heart Rate: 81 Resp: 18 SpO2: 96 % O2 Device: None (Room air) Oxygen Delivery: 1 LPM Height: 167.6 cm (5' 6\") Weight: 141.5 kg (311 lb 14.4 oz)  Estimated body mass index is 50.34 kg/(m^2) as calculated from the following:    Height as of this encounter: 1.676 m (5' 6\").    Weight as of this encounter: 141.5 kg (311 lb 14.4 oz).  PERR, EOMI  Neck ; supple. JVD . No TM  Chest ; bibasilar fine  rales , occ rhonchi   CVS ; RRR, no m/r/g . S1 and S2.   GI ; soft abdomen , non tender , BS positive.   Ext ; edema  Neuro ; alert and oriented .No facial asymmetry     ROUTINE IP LABS (Last four results)  BMP  Recent Labs  Lab 07/19/18  0709 07/18/18  1907 07/18/18  0740 07/17/18  1613 07/17/18  0749    140 140  --  139   POTASSIUM 4.1 4.6 4.5 4.1 3.2*   CHLORIDE 105 105 105  --  104   ANTOINETTE 8.6 8.9 8.6  --  9.0   CO2 29 28 31  --  27   BUN 43* 45* 45*  --  46*   CR 1.49* 1.50* 1.55*  --  1.71*   * 144* 144*  --  99     CBC  Recent Labs  Lab 07/19/18  0709 07/18/18  0740 07/17/18  0749 07/14/18  0700 07/13/18  0507   WBC  --  8.6 10.8 8.9 8.7   RBC  --  2.93* 3.23* 2.96* 2.72*   HGB 9.3* 7.9* 8.6* 8.0* 7.5*   HCT 29.7* 26.4* 28.8* 27.4* 25.3*   MCV  --  90 89 93 93   MCH  --  27.0 26.6 27.0 27.6   MCHC  --  29.9* 29.9* 29.2* 29.6*   RDW  --  15.6* 15.4* 16.8* 16.6*   PLT  --  240 235 248 232     INRNo lab results found in last 7 days.      A/P ;  Fluid overload : weight 141 kg. Continue IV Bumex   Recheck BMP in am     Acute Blood loss Anemia ; s/p PRBC 7/18   Recheck in am     IDDM ; Endocrine involved         "

## 2018-07-19 NOTE — PLAN OF CARE
Pt up in room I with walker.Had one unit of PRBC,uneventful.Next hgb in am.BMP done,see results.Pt c/o pain in sternal incison,ketamin cream helpful.BG covered with sliding scale.Held hs hydralazine for diastolic of 60.Pt had IV lasix after blood and bumex,u/o over 650cc.Continue with POC.

## 2018-07-20 ENCOUNTER — CARE COORDINATION (OUTPATIENT)
Dept: CARDIOLOGY | Facility: CLINIC | Age: 73
End: 2018-07-20

## 2018-07-20 ENCOUNTER — APPOINTMENT (OUTPATIENT)
Dept: CARDIOLOGY | Facility: CLINIC | Age: 73
DRG: 949 | End: 2018-07-20
Attending: PHYSICAL MEDICINE & REHABILITATION
Payer: MEDICARE

## 2018-07-20 LAB
ANION GAP SERPL CALCULATED.3IONS-SCNC: 7 MMOL/L (ref 3–14)
BUN SERPL-MCNC: 43 MG/DL (ref 7–30)
CALCIUM SERPL-MCNC: 8.3 MG/DL (ref 8.5–10.1)
CHLORIDE SERPL-SCNC: 107 MMOL/L (ref 94–109)
CO2 SERPL-SCNC: 29 MMOL/L (ref 20–32)
CREAT SERPL-MCNC: 1.5 MG/DL (ref 0.52–1.04)
GFR SERPL CREATININE-BSD FRML MDRD: 34 ML/MIN/1.7M2
GLUCOSE BLDC GLUCOMTR-MCNC: 107 MG/DL (ref 70–99)
GLUCOSE BLDC GLUCOMTR-MCNC: 126 MG/DL (ref 70–99)
GLUCOSE BLDC GLUCOMTR-MCNC: 144 MG/DL (ref 70–99)
GLUCOSE BLDC GLUCOMTR-MCNC: 153 MG/DL (ref 70–99)
GLUCOSE BLDC GLUCOMTR-MCNC: 199 MG/DL (ref 70–99)
GLUCOSE BLDC GLUCOMTR-MCNC: 208 MG/DL (ref 70–99)
GLUCOSE SERPL-MCNC: 129 MG/DL (ref 70–99)
HGB BLD-MCNC: 8.3 G/DL (ref 11.7–15.7)
MAGNESIUM SERPL-MCNC: 1.6 MG/DL (ref 1.6–2.3)
NT-PROBNP SERPL-MCNC: 951 PG/ML (ref 0–900)
POTASSIUM SERPL-SCNC: 3.6 MMOL/L (ref 3.4–5.3)
SODIUM SERPL-SCNC: 143 MMOL/L (ref 133–144)

## 2018-07-20 PROCEDURE — 25000132 ZZH RX MED GY IP 250 OP 250 PS 637: Mod: GY | Performed by: STUDENT IN AN ORGANIZED HEALTH CARE EDUCATION/TRAINING PROGRAM

## 2018-07-20 PROCEDURE — 36592 COLLECT BLOOD FROM PICC: CPT | Performed by: INTERNAL MEDICINE

## 2018-07-20 PROCEDURE — 99233 SBSQ HOSP IP/OBS HIGH 50: CPT | Mod: GC | Performed by: INTERNAL MEDICINE

## 2018-07-20 PROCEDURE — 25000132 ZZH RX MED GY IP 250 OP 250 PS 637: Mod: GY | Performed by: GENERAL PRACTICE

## 2018-07-20 PROCEDURE — 25000132 ZZH RX MED GY IP 250 OP 250 PS 637: Mod: GY | Performed by: PHYSICAL MEDICINE & REHABILITATION

## 2018-07-20 PROCEDURE — A9270 NON-COVERED ITEM OR SERVICE: HCPCS | Mod: GY | Performed by: PHYSICAL MEDICINE & REHABILITATION

## 2018-07-20 PROCEDURE — 97535 SELF CARE MNGMENT TRAINING: CPT | Mod: GP | Performed by: PHYSICAL THERAPIST

## 2018-07-20 PROCEDURE — A9270 NON-COVERED ITEM OR SERVICE: HCPCS | Mod: GY | Performed by: GENERAL PRACTICE

## 2018-07-20 PROCEDURE — A9270 NON-COVERED ITEM OR SERVICE: HCPCS | Mod: GY | Performed by: STUDENT IN AN ORGANIZED HEALTH CARE EDUCATION/TRAINING PROGRAM

## 2018-07-20 PROCEDURE — 93306 TTE W/DOPPLER COMPLETE: CPT | Mod: 26 | Performed by: INTERNAL MEDICINE

## 2018-07-20 PROCEDURE — 83880 ASSAY OF NATRIURETIC PEPTIDE: CPT | Performed by: INTERNAL MEDICINE

## 2018-07-20 PROCEDURE — 80048 BASIC METABOLIC PNL TOTAL CA: CPT | Performed by: INTERNAL MEDICINE

## 2018-07-20 PROCEDURE — 00000146 ZZHCL STATISTIC GLUCOSE BY METER IP

## 2018-07-20 PROCEDURE — 12800006 ZZH R&B REHAB

## 2018-07-20 PROCEDURE — 25000128 H RX IP 250 OP 636: Performed by: PHYSICAL MEDICINE & REHABILITATION

## 2018-07-20 PROCEDURE — 83735 ASSAY OF MAGNESIUM: CPT | Performed by: INTERNAL MEDICINE

## 2018-07-20 PROCEDURE — 25000132 ZZH RX MED GY IP 250 OP 250 PS 637: Mod: GY | Performed by: PHYSICIAN ASSISTANT

## 2018-07-20 PROCEDURE — 85018 HEMOGLOBIN: CPT | Performed by: INTERNAL MEDICINE

## 2018-07-20 PROCEDURE — 25000125 ZZHC RX 250: Performed by: STUDENT IN AN ORGANIZED HEALTH CARE EDUCATION/TRAINING PROGRAM

## 2018-07-20 PROCEDURE — 84132 ASSAY OF SERUM POTASSIUM: CPT | Performed by: INTERNAL MEDICINE

## 2018-07-20 PROCEDURE — 93306 TTE W/DOPPLER COMPLETE: CPT

## 2018-07-20 PROCEDURE — 25500064 ZZH RX 255 OP 636: Performed by: PHYSICAL MEDICINE & REHABILITATION

## 2018-07-20 PROCEDURE — A9270 NON-COVERED ITEM OR SERVICE: HCPCS | Mod: GY | Performed by: PHYSICIAN ASSISTANT

## 2018-07-20 PROCEDURE — 40000193 ZZH STATISTIC PT WARD VISIT: Performed by: PHYSICAL THERAPIST

## 2018-07-20 RX ORDER — TRAZODONE HYDROCHLORIDE 50 MG/1
TABLET, FILM COATED ORAL
Qty: 90 TABLET | Refills: 9 | Status: SHIPPED | OUTPATIENT
Start: 2018-07-20 | End: 2018-07-22

## 2018-07-20 RX ORDER — BUMETANIDE 0.25 MG/ML
3 INJECTION INTRAMUSCULAR; INTRAVENOUS ONCE
Status: COMPLETED | OUTPATIENT
Start: 2018-07-20 | End: 2018-07-20

## 2018-07-20 RX ORDER — MAGNESIUM SULFATE HEPTAHYDRATE 40 MG/ML
2 INJECTION, SOLUTION INTRAVENOUS DAILY PRN
Status: DISCONTINUED | OUTPATIENT
Start: 2018-07-20 | End: 2018-07-23 | Stop reason: HOSPADM

## 2018-07-20 RX ORDER — MINERAL OIL/HYDROPHIL PETROLAT
OINTMENT (GRAM) TOPICAL DAILY
Status: DISCONTINUED | OUTPATIENT
Start: 2018-07-20 | End: 2018-07-23 | Stop reason: HOSPADM

## 2018-07-20 RX ORDER — BUMETANIDE 0.25 MG/ML
3 INJECTION INTRAMUSCULAR; INTRAVENOUS 3 TIMES DAILY
Status: DISCONTINUED | OUTPATIENT
Start: 2018-07-20 | End: 2018-07-21

## 2018-07-20 RX ORDER — MAGNESIUM SULFATE HEPTAHYDRATE 40 MG/ML
4 INJECTION, SOLUTION INTRAVENOUS EVERY 4 HOURS PRN
Status: DISCONTINUED | OUTPATIENT
Start: 2018-07-20 | End: 2018-07-23 | Stop reason: HOSPADM

## 2018-07-20 RX ORDER — NIACIN 500 MG/1
TABLET, EXTENDED RELEASE ORAL
Qty: 60 TABLET | Refills: 9 | Status: SHIPPED | OUTPATIENT
Start: 2018-07-20 | End: 2018-07-22

## 2018-07-20 RX ADMIN — BUMETANIDE 3 MG: 0.25 INJECTION INTRAMUSCULAR; INTRAVENOUS at 10:27

## 2018-07-20 RX ADMIN — ACETAMINOPHEN 975 MG: 325 TABLET, FILM COATED ORAL at 21:20

## 2018-07-20 RX ADMIN — GUAIFENESIN 600 MG: 600 TABLET, EXTENDED RELEASE ORAL at 09:08

## 2018-07-20 RX ADMIN — CIPROFLOXACIN HYDROCHLORIDE 250 MG: 250 TABLET, FILM COATED ORAL at 09:09

## 2018-07-20 RX ADMIN — CHLOROTHIAZIDE 500 MG: 250 TABLET ORAL at 16:51

## 2018-07-20 RX ADMIN — FEBUXOSTAT 40 MG: 40 TABLET ORAL at 21:19

## 2018-07-20 RX ADMIN — HYDRALAZINE HYDROCHLORIDE 75 MG: 50 TABLET ORAL at 13:09

## 2018-07-20 RX ADMIN — Medication: at 14:59

## 2018-07-20 RX ADMIN — ASPIRIN 325 MG: 325 TABLET, DELAYED RELEASE ORAL at 09:10

## 2018-07-20 RX ADMIN — REPAGLINIDE 1 MG: 1 TABLET ORAL at 18:20

## 2018-07-20 RX ADMIN — NIACIN ER 500 MG TABLET,EXTENDED RELEASE 500 MG: at 21:19

## 2018-07-20 RX ADMIN — INSULIN HUMAN 30 UNITS: 100 INJECTION, SUSPENSION SUBCUTANEOUS at 21:45

## 2018-07-20 RX ADMIN — INSULIN ASPART 1 UNITS: 100 INJECTION, SOLUTION INTRAVENOUS; SUBCUTANEOUS at 21:45

## 2018-07-20 RX ADMIN — NIACIN ER 500 MG TABLET,EXTENDED RELEASE 500 MG: at 09:10

## 2018-07-20 RX ADMIN — FLUTICASONE FUROATE AND VILANTEROL TRIFENATATE 1 PUFF: 100; 25 POWDER RESPIRATORY (INHALATION) at 09:10

## 2018-07-20 RX ADMIN — LIDOCAINE 3 PATCH: 560 PATCH PERCUTANEOUS; TOPICAL; TRANSDERMAL at 21:20

## 2018-07-20 RX ADMIN — HUMAN ALBUMIN MICROSPHERES AND PERFLUTREN 6 ML: 10; .22 INJECTION, SOLUTION INTRAVENOUS at 12:30

## 2018-07-20 RX ADMIN — Medication: at 21:38

## 2018-07-20 RX ADMIN — CHLOROTHIAZIDE 500 MG: 250 TABLET ORAL at 09:19

## 2018-07-20 RX ADMIN — INSULIN HUMAN 35 UNITS: 100 INJECTION, SUSPENSION SUBCUTANEOUS at 08:36

## 2018-07-20 RX ADMIN — PREGABALIN 100 MG: 100 CAPSULE ORAL at 09:25

## 2018-07-20 RX ADMIN — PREGABALIN 100 MG: 100 CAPSULE ORAL at 21:19

## 2018-07-20 RX ADMIN — REPAGLINIDE 1 MG: 1 TABLET ORAL at 07:39

## 2018-07-20 RX ADMIN — REPAGLINIDE 1 MG: 1 TABLET ORAL at 13:03

## 2018-07-20 RX ADMIN — ESCITALOPRAM OXALATE 20 MG: 20 TABLET ORAL at 09:10

## 2018-07-20 RX ADMIN — LEVOTHYROXINE SODIUM 150 MCG: 25 TABLET ORAL at 09:09

## 2018-07-20 RX ADMIN — GUAIFENESIN 600 MG: 600 TABLET, EXTENDED RELEASE ORAL at 21:19

## 2018-07-20 RX ADMIN — METOPROLOL TARTRATE 75 MG: 25 TABLET, FILM COATED ORAL at 21:19

## 2018-07-20 RX ADMIN — ACETAMINOPHEN 975 MG: 325 TABLET, FILM COATED ORAL at 13:02

## 2018-07-20 RX ADMIN — INSULIN ASPART 1 UNITS: 100 INJECTION, SOLUTION INTRAVENOUS; SUBCUTANEOUS at 08:35

## 2018-07-20 RX ADMIN — HYDRALAZINE HYDROCHLORIDE 75 MG: 50 TABLET ORAL at 09:09

## 2018-07-20 RX ADMIN — CHOLECALCIFEROL CAP 125 MCG (5000 UNIT) 10000 UNITS: 125 CAP at 09:10

## 2018-07-20 RX ADMIN — ACETAMINOPHEN 975 MG: 325 TABLET, FILM COATED ORAL at 07:39

## 2018-07-20 RX ADMIN — WHITE PETROLATUM: 1.75 OINTMENT TOPICAL at 09:12

## 2018-07-20 RX ADMIN — Medication: at 09:25

## 2018-07-20 RX ADMIN — TRAZODONE HYDROCHLORIDE 150 MG: 50 TABLET ORAL at 21:19

## 2018-07-20 RX ADMIN — BUMETANIDE 3 MG: 0.25 INJECTION INTRAMUSCULAR; INTRAVENOUS at 18:06

## 2018-07-20 RX ADMIN — MAGNESIUM SULFATE HEPTAHYDRATE 2 G: 40 INJECTION, SOLUTION INTRAVENOUS at 16:55

## 2018-07-20 RX ADMIN — PANTOPRAZOLE SODIUM 40 MG: 40 TABLET, DELAYED RELEASE ORAL at 07:39

## 2018-07-20 RX ADMIN — BUMETANIDE 3 MG: 0.25 INJECTION INTRAMUSCULAR; INTRAVENOUS at 14:38

## 2018-07-20 RX ADMIN — HYDRALAZINE HYDROCHLORIDE 75 MG: 50 TABLET ORAL at 21:20

## 2018-07-20 RX ADMIN — CIPROFLOXACIN HYDROCHLORIDE 250 MG: 250 TABLET, FILM COATED ORAL at 21:20

## 2018-07-20 RX ADMIN — METOPROLOL TARTRATE 75 MG: 25 TABLET, FILM COATED ORAL at 09:08

## 2018-07-20 RX ADMIN — Medication 3 MG: at 21:19

## 2018-07-20 RX ADMIN — ATORVASTATIN CALCIUM 40 MG: 40 TABLET, FILM COATED ORAL at 21:20

## 2018-07-20 RX ADMIN — ACETAMINOPHEN 800 MCG: 400 TABLET ORAL at 09:10

## 2018-07-20 NOTE — PROGRESS NOTES
Diabetes Consult Daily  Progress Note          Assessment/Plan:                           Mariel Ribeiro is a 73 year old female with a history of type 2 diabetes,obesity, COPD, CAD with history of stent placement, CKD stage III, hyperlipidemia,fibromyalgia, anxiety, depression, lymphedema, S/P CABG on 07/ 02/18.      Glucoses mostly in the low to mid 100s, except when she misses aspart for snacks (then up to 200s).      PLAN  -NPH 35 units am ( 0900)  -NPH 30 units PM ( 2000)  -repaglinide 1mg TID with meals   -aspart for correction  high intensity ac and hs  -monitor glucose ac, hs, and 0200          Outpatient diabetes follow up: Pt and SO would like to consider seeing endocrinologist- ph number for [facility] included in AVS.  Plan discussed with patient and  pharmacist                     Interval History:   The last 24 hours progress and nursing notes reviewed.  Blood sugars are in good control with current plan  Glucose < 200 and no hypoglycemia   For breakfast is ahving ~ 27 grams of CHO ( yogurt and omelet )  Discussed current diet and if her current diet is manageable at discharge.  Ms. Ribeiro is having friends to [facility] today and planning on having a strawberry DQ.   Is working with therapies        Recent Labs  Lab 07/20/18  1242 07/20/18  0808 07/20/18  0534 07/20/18  0205 07/19/18  2109 07/19/18  1810 07/19/18  1151  07/19/18  0709  07/18/18  1907  07/18/18  0740  07/17/18  0749  07/16/18  0606   GLC  --   --  129*  --   --   --   --   --  112*  --  144*  --  144*  --  99  --  171*   * 144*  --  153* 199* 120* 158*  < >  --   < >  --   < >  --   < >  --   < >  --    < > = values in this interval not displayed.            Review of Systems:   See interval hx          Medications:       Active Diet Order      Combination Diet [id number] Calories: High Consistent CHO (4-7 CHO units/meal); Thin Liquids (water, ice chips, juice, milk, gelatin, ice cream, etc)    "  Physical Exam:  Gen: Alert, sitting on chair, NAD   HEENT: NC/AT, mucous membranes are moist  Resp: Unlabored  Ext: + lower extremity edema, moving all extremities   Neuro:oriented x3, communicating clearly  /59  Pulse 81  Temp 98  F (36.7  C) (Oral)  Resp 16  Ht 1.676 m (5' 6\")  Wt 142 kg (313 lb 1.6 oz)  SpO2 96%  BMI 50.54 kg/m2           Data:     Lab Results   Component Value Date    A1C 9.3 07/01/2018    A1C 8.2 03/19/2018    A1C 7.3 08/09/2017    A1C 7.2 03/08/2017    A1C 7.3 12/23/2016              CBC RESULTS:   Recent Labs   Lab Test  07/20/18   0534  07/19/18   0709  07/18/18   0740   WBC   --    --   8.6   RBC   --    --   2.93*   HGB  8.3*  9.3*  7.9*   HCT   --   29.7*  26.4*   MCV   --    --   90   MCH   --    --   27.0   MCHC   --    --   29.9*   RDW   --    --   15.6*   PLT   --    --   240     Recent Labs   Lab Test  07/20/18   0534  07/19/18   0709   NA  143  140   POTASSIUM  3.6  4.1   CHLORIDE  107  105   CO2  29  29   ANIONGAP  7  6   GLC  129*  112*   BUN  43*  43*   CR  1.50*  1.49*   ANTOINETTE  8.3*  8.6     Liver Function Studies -   Recent Labs   Lab Test  07/01/18   0621   PROTTOTAL  6.1*   ALBUMIN  3.1*   BILITOTAL  1.0   ALKPHOS  100   AST  20   ALT  26     Lab Results   Component Value Date    INR 1.09 07/06/2018    INR 1.30 07/03/2018    INR 1.40 07/02/2018    INR 1.52 07/02/2018    INR 1.01 06/29/2018    INR 1.12 08/08/2017    INR 1.04 05/18/2017    INR 1.06 11/18/2016    INR 1.02 10/27/2016    INR 1.19 12/19/2015    INR 1.08 05/05/2015    INR 1.4 05/04/2015    INR 0.97 01/28/2014         Naila Rose -[phone]  Diabetes Management job code [id number]            "

## 2018-07-20 NOTE — DISCHARGE INSTRUCTIONS
1) CVTS surgery appointment rescheduled for Tuesday, 7/24 at 1:30 PM. [facility] clinic if unable to make the CVTS appointment.    2) PCP within 2 weeks of discharge. Appointment scheduled for 7/27 at 1:40PM with Dr. William.    3) Endocrine clinic as scheduled July 30th, 12:30.     4) Dr. Stephanie Wolf for heart failure clinic scheduled 8/15/2018.    5) Outpatient sports PM&R with Sean Ramos for left shoulder pain and possible GH joint injection.As needed or HUC to schedule?    DIABETES  Plan for home:  -NPH 35 units at 8am and 35 units with supper  -repaglinide 1mg with each meal  -do NOT take regular insulin (Novolin R)  -check blood sugars before meals and bedtime (target glucose is <140 before meals, <200 at bedtime)  -follow up with your primary care provider and pharmacist within 2-3 weeks of discharge    Diabetes follow-up: Call [facility] coordinator: [phone], to schedule your outpatient diabetes appointment (if you decide you want to see an endocrinologist for diabetes management).

## 2018-07-20 NOTE — PROGRESS NOTES
"SPIRITUAL HEALTH SERVICES  SPIRITUAL ASSESSMENT Progress Note  [facility] ([facility]) 5R     PRIMARY FOCUS:     Goals of care    Symptom/pain management    Emotional/spiritual/Mosque distress    Support for coping    REFERRAL SOURCE: LOS    ILLNESS CIRCUMSTANCES:   Reviewed documentation. Reflective conversation shared with Mariel which integrated elements of illness and family narratives.     Context of Serious Illness/Symptom(s) - Mariel's issues of struggle include being frustrated and discouraged because she is so scheduled all day. She doesnot have quiet time to process and pray.  She said legs are uncomfortable because she is walking so much and they are rubbing together and creating burns.      Resources for Support - Her roommate, Odalis, is her main support.  She misses her dog, Rimma.      DISTRESS:     Emotional/Spiritual/Existential Distress - Keila expressed frustration with being here, and not being able to control anything.  She said, \"they control when I get to go home.  I have no say.\"      Denominational Distress - Mariel asked, \"will I ever get my connection back with God?  I feel like when they broke open my chest, they broke my connection with God.\"  She said that she doesn't have time to pray, or to process the gift that she has been given, a new lease on life with her heart surgery.      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      Emotional/Relational/Existential Connections - Mariel said that her roommate, Odalis, is her main support, that they have a very strong connection.  Odalis visits her every day.      GOALS OF CARE:    Goals of Care - Mariel wants to be able to go home.  She said she will be able to heal better if she is at home with her dog, Rimma.  "     Meaning/Sense-Making - Mariel said that God feels far away from her, she feels like her connection to God has been broken.  We prayed together, and after I prayed for Mariel, she offered a prayer for me.      PLAN: follow up on Sun, 7/22    Talisha Murphy   Intern  Pager [phone]

## 2018-07-20 NOTE — PROGRESS NOTES
Received a call this am from the physician following Mariel Ribeiro at the [facility].  Dr Rafi Crawford states the patient is having difficulty with fluid overload due to dieuretic resistance.  He states he is planning to keep the patient over the weekend for continued diuresis, and would like to know if we could move the patient's CVTS follow up to next week.  Dr Crawford also asked the nature of the follow up visit and if it is to assess post op status and remove staples from the patient's groin area he said they could manage this at the rehab facility.  Dr Crawford states the patient's groin incisions are dry and intact but reddened and irritated from the staples.  It has been 18 days from surgery.  Discussed with Dr Crawford that taking the staple out is fine and rescheduling the patient for CVTS is also ok. There are also some sutures under on the sternal incision covered by surgical glue and he said he can take those out also, but since they should dissolve he will leave them in for now.  Reschedule patient to 07/24 at 1:30 pm.

## 2018-07-20 NOTE — PLAN OF CARE
Problem: Goal/Outcome  Goal: Goal Outcome Summary  FOCUS/GOAL  Wound care management, Medical management and Skin integrity    ASSESSMENT, INTERVENTIONS AND CONTINUING PLAN FOR GOAL:  Pt alert and oriented, VSS, no complaints of pain. Pt was a little frustrated/aggitated about the plan for her discharge. Doctor spoke with pt about making sure she is medically stable before she is discharged. Pt has a lot of edema in bethany legs and has a lymphedema consult placed. Pt had a shower this shift and per the aid was able to wash front of body including ralph area but the aids assisted with her legs and backside including bottom. Lymph wraps applied to bethany legs and all dressings on legs and abdomen changed. PT has chaffing on thighs from movement, now has PRN aquaphor avail. Will continue to monitor.

## 2018-07-20 NOTE — PROGRESS NOTES
SPIRITUAL HEALTH SERVICES  SPIRITUAL ASSESSMENT Progress Note  [facility] ([facility]) 5R     REFERRAL SOURCE: BHARGAV Floyd was meeting with Dr, rehab and RN each time I attempted visit.  I asked her if I could come later in the day and she said that would be fine.      PLAN: attempt to visit 7/20, afternoon     Talisha Murphy   Intern  Pager [phone]

## 2018-07-20 NOTE — PLAN OF CARE
Problem: Goal/Outcome  Goal: Goal Outcome Summary  FOCUS/GOAL  Bowel management, Bladder management, Pain management and Mobility    ASSESSMENT, INTERVENTIONS AND CONTINUING PLAN FOR GOAL:    Patient is A&Ox4, continent of bowel/bladder, no BM this shift, voided 1x. Patient slept most of the night, CPAP on at HS. BG at 0200: 153. Continue POC.

## 2018-07-20 NOTE — PLAN OF CARE
Problem: Goal/Outcome  Goal: Goal Outcome Summary  Outcome: Therapy, progress toward functional goals as expected  Pt is alert and oriented x 4, she has ongoing pain in the left shoulder, she is on scheduled Tylenol and she is also using a heat pad to the area. She is now on Bumex 3 mg TID due to continued weight gain and edema. We need to monitor out put very closely, she has a hut in the bath room. Bilateral LE Lymph wrap was re wrapped by PT this morning. Her mag level was 1.6 this, she is going to have mag iv soon. Her pot level was 3.6, no replacement was required per protocol. Pt had complete Echocardiogram. We will continue to monitor output, follow up with the result of Echo while assisting as needed.

## 2018-07-21 LAB
ANION GAP SERPL CALCULATED.3IONS-SCNC: 8 MMOL/L (ref 3–14)
BUN SERPL-MCNC: 48 MG/DL (ref 7–30)
CALCIUM SERPL-MCNC: 9.3 MG/DL (ref 8.5–10.1)
CHLORIDE SERPL-SCNC: 100 MMOL/L (ref 94–109)
CO2 SERPL-SCNC: 30 MMOL/L (ref 20–32)
CREAT SERPL-MCNC: 1.75 MG/DL (ref 0.52–1.04)
GFR SERPL CREATININE-BSD FRML MDRD: 29 ML/MIN/1.7M2
GLUCOSE BLDC GLUCOMTR-MCNC: 126 MG/DL (ref 70–99)
GLUCOSE BLDC GLUCOMTR-MCNC: 130 MG/DL (ref 70–99)
GLUCOSE BLDC GLUCOMTR-MCNC: 130 MG/DL (ref 70–99)
GLUCOSE BLDC GLUCOMTR-MCNC: 140 MG/DL (ref 70–99)
GLUCOSE BLDC GLUCOMTR-MCNC: 207 MG/DL (ref 70–99)
GLUCOSE BLDC GLUCOMTR-MCNC: 242 MG/DL (ref 70–99)
GLUCOSE SERPL-MCNC: 134 MG/DL (ref 70–99)
MAGNESIUM SERPL-MCNC: 2.1 MG/DL (ref 1.6–2.3)
POTASSIUM SERPL-SCNC: 4.5 MMOL/L (ref 3.4–5.3)
SODIUM SERPL-SCNC: 138 MMOL/L (ref 133–144)

## 2018-07-21 PROCEDURE — A9270 NON-COVERED ITEM OR SERVICE: HCPCS | Mod: GY | Performed by: INTERNAL MEDICINE

## 2018-07-21 PROCEDURE — A9270 NON-COVERED ITEM OR SERVICE: HCPCS | Mod: GY | Performed by: PHYSICAL MEDICINE & REHABILITATION

## 2018-07-21 PROCEDURE — 25000132 ZZH RX MED GY IP 250 OP 250 PS 637: Mod: GY | Performed by: STUDENT IN AN ORGANIZED HEALTH CARE EDUCATION/TRAINING PROGRAM

## 2018-07-21 PROCEDURE — 80048 BASIC METABOLIC PNL TOTAL CA: CPT | Performed by: STUDENT IN AN ORGANIZED HEALTH CARE EDUCATION/TRAINING PROGRAM

## 2018-07-21 PROCEDURE — 25000125 ZZHC RX 250: Performed by: STUDENT IN AN ORGANIZED HEALTH CARE EDUCATION/TRAINING PROGRAM

## 2018-07-21 PROCEDURE — 00000146 ZZHCL STATISTIC GLUCOSE BY METER IP

## 2018-07-21 PROCEDURE — 99207 ZZC CDG-MDM COMPONENT: MEETS MODERATE - UP CODED: CPT | Performed by: INTERNAL MEDICINE

## 2018-07-21 PROCEDURE — 25000132 ZZH RX MED GY IP 250 OP 250 PS 637: Mod: GY | Performed by: PHYSICAL MEDICINE & REHABILITATION

## 2018-07-21 PROCEDURE — 25000132 ZZH RX MED GY IP 250 OP 250 PS 637: Mod: GY | Performed by: INTERNAL MEDICINE

## 2018-07-21 PROCEDURE — 36592 COLLECT BLOOD FROM PICC: CPT | Performed by: STUDENT IN AN ORGANIZED HEALTH CARE EDUCATION/TRAINING PROGRAM

## 2018-07-21 PROCEDURE — 25000132 ZZH RX MED GY IP 250 OP 250 PS 637: Mod: GY | Performed by: PHYSICIAN ASSISTANT

## 2018-07-21 PROCEDURE — 25000132 ZZH RX MED GY IP 250 OP 250 PS 637: Mod: GY | Performed by: GENERAL PRACTICE

## 2018-07-21 PROCEDURE — 99232 SBSQ HOSP IP/OBS MODERATE 35: CPT | Performed by: INTERNAL MEDICINE

## 2018-07-21 PROCEDURE — 12800006 ZZH R&B REHAB

## 2018-07-21 PROCEDURE — A9270 NON-COVERED ITEM OR SERVICE: HCPCS | Mod: GY | Performed by: STUDENT IN AN ORGANIZED HEALTH CARE EDUCATION/TRAINING PROGRAM

## 2018-07-21 PROCEDURE — 83735 ASSAY OF MAGNESIUM: CPT | Performed by: STUDENT IN AN ORGANIZED HEALTH CARE EDUCATION/TRAINING PROGRAM

## 2018-07-21 PROCEDURE — A9270 NON-COVERED ITEM OR SERVICE: HCPCS | Mod: GY | Performed by: PHYSICIAN ASSISTANT

## 2018-07-21 PROCEDURE — A9270 NON-COVERED ITEM OR SERVICE: HCPCS | Mod: GY | Performed by: GENERAL PRACTICE

## 2018-07-21 RX ORDER — DIPHENHYDRAMINE HYDROCHLORIDE AND LIDOCAINE HYDROCHLORIDE AND ALUMINUM HYDROXIDE AND MAGNESIUM HYDRO
10 KIT EVERY 6 HOURS PRN
Status: DISCONTINUED | OUTPATIENT
Start: 2018-07-21 | End: 2018-07-23 | Stop reason: HOSPADM

## 2018-07-21 RX ADMIN — REPAGLINIDE 1 MG: 1 TABLET ORAL at 08:32

## 2018-07-21 RX ADMIN — METOPROLOL TARTRATE 75 MG: 25 TABLET, FILM COATED ORAL at 21:03

## 2018-07-21 RX ADMIN — METOPROLOL TARTRATE 75 MG: 25 TABLET, FILM COATED ORAL at 08:33

## 2018-07-21 RX ADMIN — REPAGLINIDE 1 MG: 1 TABLET ORAL at 14:08

## 2018-07-21 RX ADMIN — Medication: at 21:30

## 2018-07-21 RX ADMIN — Medication: at 09:05

## 2018-07-21 RX ADMIN — INSULIN HUMAN 35 UNITS: 100 INJECTION, SUSPENSION SUBCUTANEOUS at 08:38

## 2018-07-21 RX ADMIN — ATORVASTATIN CALCIUM 40 MG: 40 TABLET, FILM COATED ORAL at 21:03

## 2018-07-21 RX ADMIN — PREGABALIN 100 MG: 100 CAPSULE ORAL at 08:32

## 2018-07-21 RX ADMIN — PREGABALIN 100 MG: 100 CAPSULE ORAL at 21:02

## 2018-07-21 RX ADMIN — ACETAMINOPHEN 975 MG: 325 TABLET, FILM COATED ORAL at 06:26

## 2018-07-21 RX ADMIN — INSULIN ASPART 2 UNITS: 100 INJECTION, SOLUTION INTRAVENOUS; SUBCUTANEOUS at 21:24

## 2018-07-21 RX ADMIN — LEVOTHYROXINE SODIUM 150 MCG: 25 TABLET ORAL at 08:35

## 2018-07-21 RX ADMIN — ASPIRIN 325 MG: 325 TABLET, DELAYED RELEASE ORAL at 08:32

## 2018-07-21 RX ADMIN — HYDRALAZINE HYDROCHLORIDE 75 MG: 50 TABLET ORAL at 21:03

## 2018-07-21 RX ADMIN — ACETAMINOPHEN 800 MCG: 400 TABLET ORAL at 08:32

## 2018-07-21 RX ADMIN — NIACIN ER 500 MG TABLET,EXTENDED RELEASE 500 MG: at 21:04

## 2018-07-21 RX ADMIN — FEBUXOSTAT 40 MG: 40 TABLET ORAL at 21:03

## 2018-07-21 RX ADMIN — INSULIN ASPART 3 UNITS: 100 INJECTION, SOLUTION INTRAVENOUS; SUBCUTANEOUS at 19:15

## 2018-07-21 RX ADMIN — ACETAMINOPHEN 975 MG: 325 TABLET, FILM COATED ORAL at 14:07

## 2018-07-21 RX ADMIN — POLYETHYLENE GLYCOL 3350 17 G: 17 POWDER, FOR SOLUTION ORAL at 08:31

## 2018-07-21 RX ADMIN — HYDRALAZINE HYDROCHLORIDE 75 MG: 50 TABLET ORAL at 14:07

## 2018-07-21 RX ADMIN — BUMETANIDE 3 MG: 0.25 INJECTION INTRAMUSCULAR; INTRAVENOUS at 06:21

## 2018-07-21 RX ADMIN — GUAIFENESIN 600 MG: 600 TABLET, EXTENDED RELEASE ORAL at 21:03

## 2018-07-21 RX ADMIN — DIPHENHYDRAMINE HYDROCHLORIDE AND LIDOCAINE HYDROCHLORIDE AND ALUMINUM HYDROXIDE AND MAGNESIUM HYDRO 10 ML: KIT at 21:13

## 2018-07-21 RX ADMIN — ESCITALOPRAM OXALATE 20 MG: 20 TABLET ORAL at 08:35

## 2018-07-21 RX ADMIN — HYDRALAZINE HYDROCHLORIDE 75 MG: 50 TABLET ORAL at 08:36

## 2018-07-21 RX ADMIN — TRAZODONE HYDROCHLORIDE 150 MG: 50 TABLET ORAL at 21:03

## 2018-07-21 RX ADMIN — ACETAMINOPHEN 975 MG: 325 TABLET, FILM COATED ORAL at 21:02

## 2018-07-21 RX ADMIN — REPAGLINIDE 1 MG: 1 TABLET ORAL at 19:15

## 2018-07-21 RX ADMIN — NIACIN ER 500 MG TABLET,EXTENDED RELEASE 500 MG: at 08:34

## 2018-07-21 RX ADMIN — Medication: at 14:12

## 2018-07-21 RX ADMIN — FLUTICASONE FUROATE AND VILANTEROL TRIFENATATE 1 PUFF: 100; 25 POWDER RESPIRATORY (INHALATION) at 08:37

## 2018-07-21 RX ADMIN — GUAIFENESIN 600 MG: 600 TABLET, EXTENDED RELEASE ORAL at 08:33

## 2018-07-21 RX ADMIN — CHOLECALCIFEROL CAP 125 MCG (5000 UNIT) 10000 UNITS: 125 CAP at 08:32

## 2018-07-21 RX ADMIN — INSULIN HUMAN 30 UNITS: 100 INJECTION, SUSPENSION SUBCUTANEOUS at 21:04

## 2018-07-21 RX ADMIN — PANTOPRAZOLE SODIUM 40 MG: 40 TABLET, DELAYED RELEASE ORAL at 06:26

## 2018-07-21 RX ADMIN — LIDOCAINE 3 PATCH: 560 PATCH PERCUTANEOUS; TOPICAL; TRANSDERMAL at 20:29

## 2018-07-21 RX ADMIN — Medication 3 MG: at 21:03

## 2018-07-21 NOTE — PLAN OF CARE
"Problem: Goal/Outcome  Goal: Goal Outcome Summary  Pt a&ox4. Uses call light appropriately. Mod I in room using cane. Pt continent of bladder on the toilet in br. Denied pain. Voiding often this shift after receiving 3mg IV Bumex. Pt put out >3L of urine this shift. At 2130 pt reported feeling \"off\" complains of light headedness and feeling cold. Vitals and BG checked and all WNL. Stated to pt could possibly be due to the diuresing. On call PMR updated. No new orders just continue to assess to r/o worsening symptoms. Pt took all PM medication as ordered and is laying in bed, reports feeling \"ok\" when laying down. Will continue to monitor throughout night.        "

## 2018-07-21 NOTE — PROGRESS NOTES
"                     Diabetes Consult Daily  Progress Note          Assessment/Plan:                           Mariel Ribeiro is a 73 year old female with a history of type 2 diabetes,obesity, COPD, CAD with history of stent placement, CKD stage III, hyperlipidemia,fibromyalgia, anxiety, depression, lymphedema, S/P CABG on 07/ 02/18.      Glucoses stable in mid 100s except for spike at HS after pt had milkshake.      PLAN  -NPH 35 units am ( 0900)  -NPH 30 units PM ( 2000)  -repaglinide 1mg TID with meals   -aspart for correction  high intensity ac and hs  -monitor glucose ac, hs, and 0200          Outpatient diabetes follow up: Pt and SO would like to consider seeing endocrinologist-  number for [facility] included in AVS.  Plan discussed with patient and charge RN.                    Interval History:   The last 24 hours progress and nursing notes reviewed.  Temporary spike in glucose last night to low 200s following strawberry shake (Mariel only drank half the shake).  Otherwise Bg remain in good control.  Mariel plans to continue with the diet changes she has made this admission: smaller portion sizes, only occasional sweets (\"I'm not going to go back to eating chocolate all the time\").    Aiming for discharge Monday.  She is now done with therapy.        Recent Labs  Lab 07/21/18  1327 07/21/18  0944 07/21/18  0747 07/21/18  0706 07/21/18  0204 07/20/18  2111 07/20/18  1820  07/20/18  0534  07/19/18  0709  07/18/18  1907  07/18/18  0740  07/17/18  0749   GLC  --   --   --  134*  --   --   --   --  129*  --  112*  --  144*  --  144*  --  99   * 140* 130*  --  126* 208* 126*  < >  --   < >  --   < >  --   < >  --   < >  --    < > = values in this interval not displayed.            Review of Systems:   See interval hx          Medications:       Active Diet Order      Combination Diet 0753-7131 Calories: High Consistent CHO (4-7 CHO units/meal); Thin Liquids (water, ice chips, juice, milk, " "gelatin, ice cream, etc)     Physical Exam:  Gen: Alert, sitting on chair, NAD   HEENT: NC/AT, mucous membranes are moist  Resp: Unlabored  Ext: + lower extremity edema, moving all extremities   Neuro:oriented x3, communicating clearly  /51 (BP Location: Left arm)  Pulse 69  Temp 99.1  F (37.3  C) (Oral)  Resp 16  Ht 1.676 m (5' 6\")  Wt 142 kg (313 lb 1.6 oz)  SpO2 93%  BMI 50.54 kg/m2           Data:     Lab Results   Component Value Date    A1C 9.3 07/01/2018    A1C 8.2 03/19/2018    A1C 7.3 08/09/2017    A1C 7.2 03/08/2017    A1C 7.3 12/23/2016            Recent Labs   Lab Test  07/21/18   0706  07/20/18   0534   NA  138  143   POTASSIUM  4.5  3.6   CHLORIDE  100  107   CO2  30  29   ANIONGAP  8  7   GLC  134*  129*   BUN  48*  43*   CR  1.75*  1.50*   ANTOINETTE  9.3  8.3*     CBC RESULTS:   Recent Labs   Lab Test  07/20/18   0534  07/19/18   0709  07/18/18   0740   WBC   --    --   8.6   RBC   --    --   2.93*   HGB  8.3*  9.3*  7.9*   HCT   --   29.7*  26.4*   MCV   --    --   90   MCH   --    --   27.0   MCHC   --    --   29.9*   RDW   --    --   15.6*   PLT   --    --   240     Kia Hdz PA-C [phone]  Diabetes Management job code [id number]            "

## 2018-07-21 NOTE — PROGRESS NOTES
FOCUS/GOAL  Bladder management and Pain management    ASSESSMENT, INTERVENTIONS AND CONTINUING PLAN FOR GOAL:  Pt slept well all NOC, did not complain of pain or discomfort. MOD I in the room with a cane, uses call light approprietly. NSG to continue with POC

## 2018-07-21 NOTE — PROGRESS NOTES
"  Tired today as up during night urinating  Wants to sleep   ? Use Cpap last night   No cp   No sob   No fever   No chills  No nausea  No vomiting   No loc       Vital signs:  Temp: 99.1  F (37.3  C) Temp src: Oral BP: 137/51 Pulse: 69 Heart Rate: 73 Resp: 16 SpO2: 93 % O2 Device: None (Room air) Oxygen Delivery: 1 LPM Height: 167.6 cm (5' 6\") Weight: 142 kg (313 lb 1.6 oz)  Estimated body mass index is 50.54 kg/(m^2) as calculated from the following:    Height as of this encounter: 1.676 m (5' 6\").    Weight as of this encounter: 142 kg (313 lb 1.6 oz).  PERR, EOMI  Neck ; supple. No JVD . No TM  Chest ; reduced at bases, occ rhonchi  CVS ; RRR, no m/r/g . S1 and S2.   GI ; soft abdomen , non tender , BS positive.   Ext ; edema 3 plus  Neuro ; alert and oriented .No facial asymmetry     ROUTINE IP LABS (Last four results)  BMP  Recent Labs  Lab 07/21/18  0706 07/20/18  0534 07/19/18  0709 07/18/18  1907    143 140 140   POTASSIUM 4.5 3.6 4.1 4.6   CHLORIDE 100 107 105 105   ANTOINETTE 9.3 8.3* 8.6 8.9   CO2 30 29 29 28   BUN 48* 43* 43* 45*   CR 1.75* 1.50* 1.49* 1.50*   * 129* 112* 144*     CBC  Recent Labs  Lab 07/20/18  0534 07/19/18  0709 07/18/18  0740 07/17/18  0749   WBC  --   --  8.6 10.8   RBC  --   --  2.93* 3.23*   HGB 8.3* 9.3* 7.9* 8.6*   HCT  --  29.7* 26.4* 28.8*   MCV  --   --  90 89   MCH  --   --  27.0 26.6   MCHC  --   --  29.9* 29.9*   RDW  --   --  15.6* 15.4*   PLT  --   --  240 235     INRNo lab results found in last 7 days.    ;    A/P ;  Fluid overload : (echo on 7/20  EF 55-60 %. IVC dilated . Right atrial pressure increased ).  Weight not done yet   4.7 Liter out put   DC IV Bumex  Start oral Bumex 3 mg BID   BMP in am        CKD: SCr 1.7 today , recheck in a, . replace K/Mg as needed     Acute Blood loss Anemia ; s/p PRBC 7/18, Hgb improved appropriately but now continuing to trend down. Will keep trending to ensure Hgb >8. Consider reimaging of R leg to eval interval change of " hematoma     IDDM ; Endocrine involved . -[phone]  Discuss with PMR 7/21

## 2018-07-21 NOTE — PROGRESS NOTES
".[facility] Acute Rehabilitation Unit Progress Note    Patient Name: Mariel Ribeiro   YOB: 1945  MRN: [id number]     Age / Sex: 73 year old female    ASSESSMENT/PLAN:  Mariel Ribeiro is a 73 year old female with PMH of DM2, HTN, CKD3, HLD, COPD, fibromyalgia, anxiety, depression, lymphedema, thyroid cancer s/p resection, and multivessel CAD. On 7/2/2018 she underwent a 3 vessel CABG (LIMA to LAD, SVG to R PDA, and SVG to OMA2).  Admitted on 7/13/2018  Patient progressing through therapies, with current sustaining cares meeting needs. See primary team note for details.  - EMR reviewed, no acute overnight events, vital stable. Had some issues last night with feeling, \"crummy,\" shortness of breath, lightheaded and dizzy.   - labs    CR 1.75 from 1.50 yesterday  - imaging none new  - continue multidisciplinary therapies and plan of care.  - will monitor mouth soreness, perhaps consider PRN mouthwash    SUBJECTIVE/INTERVAL HX:    Patient was seen and examined at bedside rounds. Reports feeling well. Slept poorly due to having to get up to urinate throughout the night. Denies fevers/chills, shortness of breath, chest pain, bowel/bladder changes, or new pain. Patient feeling better this AM compared with last night. Patient reports developing sore in roof of mouth, starting yesterday and today. Endorses incisional unchanged chest pain. Last BM Thursday.    PHYSICAL EXAM:  Vitals: Temp: 99.1  F (37.3  C) Temp src: Oral BP: 137/51 Pulse: 69 Heart Rate: 73 Resp: 16 SpO2: 93 % O2 Device: None (Room air)    Gen: No acute distress, pleasant  HEENT: hearing present to speaking voice. Non-open sore present on upper posterior palate, yellowish in color, no draining.   CVS: RRR, severe extremity edema noted bilaterally - dressings in place  Resp: breathing unlabored at rest on room air  MSK: moving all limbs spontaneously  Neuro: AOx3, communicative  Psych: nl affect    Pt " discussed with Dr. Sean Ramos DO.    Chloe Tavares DO  PGY-2 PM&R Resident  [facility]  Pager: [phone]

## 2018-07-21 NOTE — PROGRESS NOTES
[facility] CLARIFICATION NOTE FOR ADMIT ASSESSMENT PERIOD  Lowest score for each FIM item supported by available charting    Eating: FIM 5; for one meal, patient needing set up assist  Grooming: FIM 4; patient needing CGA for hand hygiene  Bathing: FIM 3; patient needing moderate assistance  Upper Body Dressing: FIM 3; overall needing moderate assistance to remove shirg  Lower Body Dressing: FIM 2; for full lower body dressing, needing maximal assistance  Toileting: FIM 3; overall needing moderate assistance with toileting  Bladder:  FIM 6 due to pad  Bladder Number of Accidents: 0 (no accidents, but episodes of incontinence)  Bowel: FIM 6 due to medications  Bowel Number of Accidents: 0  Transfer: FIM 3; at times patient needing moderate assistance with bed mobility  Toilet transfer: FIM 4; overall patient needing CGA  Tub/shower transfer: FIM 4; patient needing CGA  Locomotion distance:  At times limited to less than 50 feet due to fatigue  Locomotion: FIM 1 due to distance  Stairs: FIM 2 due to number of stairs  Comprehension: FIM 5; patient needing some repetition; overall supervision  Expression: FIM 6; mild difficulty noted  Social Interaction: FIM 6 due to medications  Problem solving: FIM 4; overall patient needing minimal assistance  Memory: FIM 5; patient needing supervision

## 2018-07-21 NOTE — PLAN OF CARE
Problem: Patient Care: Nursing Focus  Goal: Bladder  Outcome: Improving  FOCUS/GOAL  Pain management, Wound care management, Medical management, Skin integrity and Safety management    ASSESSMENT, INTERVENTIONS AND CONTINUING PLAN FOR GOAL:  Pt was A&Ox4 this shift. Pt's incision was IMANI and showed no sign of infection. Pt has a double lumen PICC line in R arm that was patent. Pt is to continue on I&O. Pt had BM this shift.

## 2018-07-22 LAB
ANION GAP SERPL CALCULATED.3IONS-SCNC: 9 MMOL/L (ref 3–14)
BUN SERPL-MCNC: 54 MG/DL (ref 7–30)
CALCIUM SERPL-MCNC: 9.2 MG/DL (ref 8.5–10.1)
CHLORIDE SERPL-SCNC: 101 MMOL/L (ref 94–109)
CO2 SERPL-SCNC: 31 MMOL/L (ref 20–32)
CREAT SERPL-MCNC: 1.77 MG/DL (ref 0.52–1.04)
GFR SERPL CREATININE-BSD FRML MDRD: 28 ML/MIN/1.7M2
GLUCOSE BLDC GLUCOMTR-MCNC: 126 MG/DL (ref 70–99)
GLUCOSE BLDC GLUCOMTR-MCNC: 134 MG/DL (ref 70–99)
GLUCOSE BLDC GLUCOMTR-MCNC: 139 MG/DL (ref 70–99)
GLUCOSE BLDC GLUCOMTR-MCNC: 142 MG/DL (ref 70–99)
GLUCOSE BLDC GLUCOMTR-MCNC: 199 MG/DL (ref 70–99)
GLUCOSE SERPL-MCNC: 126 MG/DL (ref 70–99)
POTASSIUM SERPL-SCNC: 2.7 MMOL/L (ref 3.4–5.3)
POTASSIUM SERPL-SCNC: 3.7 MMOL/L (ref 3.4–5.3)
POTASSIUM SERPL-SCNC: 4.6 MMOL/L (ref 3.4–5.3)
SODIUM SERPL-SCNC: 141 MMOL/L (ref 133–144)

## 2018-07-22 PROCEDURE — A9270 NON-COVERED ITEM OR SERVICE: HCPCS | Mod: GY | Performed by: GENERAL PRACTICE

## 2018-07-22 PROCEDURE — 25000128 H RX IP 250 OP 636

## 2018-07-22 PROCEDURE — A9270 NON-COVERED ITEM OR SERVICE: HCPCS | Mod: GY | Performed by: PHYSICAL MEDICINE & REHABILITATION

## 2018-07-22 PROCEDURE — 36592 COLLECT BLOOD FROM PICC: CPT | Performed by: STUDENT IN AN ORGANIZED HEALTH CARE EDUCATION/TRAINING PROGRAM

## 2018-07-22 PROCEDURE — 00000146 ZZHCL STATISTIC GLUCOSE BY METER IP

## 2018-07-22 PROCEDURE — 12800006 ZZH R&B REHAB

## 2018-07-22 PROCEDURE — 99232 SBSQ HOSP IP/OBS MODERATE 35: CPT | Performed by: INTERNAL MEDICINE

## 2018-07-22 PROCEDURE — 25000132 ZZH RX MED GY IP 250 OP 250 PS 637: Mod: GY | Performed by: GENERAL PRACTICE

## 2018-07-22 PROCEDURE — 25000128 H RX IP 250 OP 636: Performed by: GENERAL PRACTICE

## 2018-07-22 PROCEDURE — 84132 ASSAY OF SERUM POTASSIUM: CPT | Performed by: STUDENT IN AN ORGANIZED HEALTH CARE EDUCATION/TRAINING PROGRAM

## 2018-07-22 PROCEDURE — 25000131 ZZH RX MED GY IP 250 OP 636 PS 637: Mod: GY | Performed by: PHYSICIAN ASSISTANT

## 2018-07-22 PROCEDURE — 25000132 ZZH RX MED GY IP 250 OP 250 PS 637: Mod: GY | Performed by: PHYSICAL MEDICINE & REHABILITATION

## 2018-07-22 PROCEDURE — 80048 BASIC METABOLIC PNL TOTAL CA: CPT | Performed by: INTERNAL MEDICINE

## 2018-07-22 PROCEDURE — 25000132 ZZH RX MED GY IP 250 OP 250 PS 637: Mod: GY | Performed by: PHYSICIAN ASSISTANT

## 2018-07-22 PROCEDURE — 36592 COLLECT BLOOD FROM PICC: CPT | Performed by: GENERAL PRACTICE

## 2018-07-22 PROCEDURE — A9270 NON-COVERED ITEM OR SERVICE: HCPCS | Mod: GY | Performed by: STUDENT IN AN ORGANIZED HEALTH CARE EDUCATION/TRAINING PROGRAM

## 2018-07-22 PROCEDURE — 84132 ASSAY OF SERUM POTASSIUM: CPT | Performed by: GENERAL PRACTICE

## 2018-07-22 PROCEDURE — 25000132 ZZH RX MED GY IP 250 OP 250 PS 637: Mod: GY | Performed by: STUDENT IN AN ORGANIZED HEALTH CARE EDUCATION/TRAINING PROGRAM

## 2018-07-22 PROCEDURE — A9270 NON-COVERED ITEM OR SERVICE: HCPCS | Mod: GY | Performed by: PHYSICIAN ASSISTANT

## 2018-07-22 PROCEDURE — 36592 COLLECT BLOOD FROM PICC: CPT | Performed by: INTERNAL MEDICINE

## 2018-07-22 RX ORDER — LANOLIN ALCOHOL/MO/W.PET/CERES
500 CREAM (GRAM) TOPICAL 2 TIMES DAILY
Qty: 60 TABLET | Refills: 0 | Status: SHIPPED | OUTPATIENT
Start: 2018-07-22 | End: 2018-08-21

## 2018-07-22 RX ORDER — NITROGLYCERIN 0.4 MG/1
TABLET SUBLINGUAL
Qty: 25 TABLET | Refills: 0 | Status: ON HOLD | OUTPATIENT
Start: 2018-07-22 | End: 2019-05-02

## 2018-07-22 RX ORDER — ESCITALOPRAM OXALATE 20 MG/1
20 TABLET ORAL EVERY MORNING
Qty: 30 TABLET | Refills: 0 | Status: ON HOLD | OUTPATIENT
Start: 2018-07-23 | End: 2019-05-02

## 2018-07-22 RX ORDER — BUMETANIDE 2 MG/1
2 TABLET ORAL
Qty: 30 TABLET | Refills: 0 | Status: SHIPPED | OUTPATIENT
Start: 2018-07-23 | End: 2018-08-06

## 2018-07-22 RX ORDER — BUMETANIDE 2 MG/1
2 TABLET ORAL
Status: DISCONTINUED | OUTPATIENT
Start: 2018-07-23 | End: 2018-07-23 | Stop reason: HOSPADM

## 2018-07-22 RX ORDER — ALBUTEROL SULFATE 90 UG/1
1-2 AEROSOL, METERED RESPIRATORY (INHALATION) EVERY 4 HOURS PRN
Qty: 1 INHALER | Refills: 0 | Status: SHIPPED | OUTPATIENT
Start: 2018-07-22 | End: 2018-08-23

## 2018-07-22 RX ORDER — POTASSIUM CHLORIDE 1500 MG/1
40 TABLET, EXTENDED RELEASE ORAL DAILY
Qty: 60 TABLET | Refills: 0 | Status: SHIPPED | OUTPATIENT
Start: 2018-07-23 | End: 2018-08-22

## 2018-07-22 RX ORDER — POLYETHYLENE GLYCOL 3350 17 G/17G
17 POWDER, FOR SOLUTION ORAL DAILY
Qty: 7 PACKET | Refills: 0 | Status: SHIPPED | OUTPATIENT
Start: 2018-07-23 | End: 2018-09-26

## 2018-07-22 RX ORDER — AMOXICILLIN 250 MG
1-2 CAPSULE ORAL 2 TIMES DAILY PRN
Qty: 30 TABLET | Refills: 0 | Status: ON HOLD | OUTPATIENT
Start: 2018-07-22 | End: 2019-05-02

## 2018-07-22 RX ORDER — REPAGLINIDE 1 MG/1
1 TABLET ORAL
Qty: 90 TABLET | Refills: 0 | Status: SHIPPED | OUTPATIENT
Start: 2018-07-22 | End: 2018-08-06

## 2018-07-22 RX ORDER — ALBUTEROL SULFATE 0.83 MG/ML
2.5 SOLUTION RESPIRATORY (INHALATION) EVERY 6 HOURS PRN
Qty: 360 ML | Refills: 0 | Status: SHIPPED | OUTPATIENT
Start: 2018-07-22 | End: 2018-07-23

## 2018-07-22 RX ORDER — METOPROLOL TARTRATE 75 MG/1
75 TABLET, FILM COATED ORAL 2 TIMES DAILY
Qty: 60 TABLET | Refills: 0 | Status: SHIPPED | OUTPATIENT
Start: 2018-07-22 | End: 2018-08-06

## 2018-07-22 RX ORDER — SODIUM CHLORIDE 9 MG/ML
INJECTION, SOLUTION INTRAVENOUS
Status: COMPLETED
Start: 2018-07-22 | End: 2018-07-22

## 2018-07-22 RX ORDER — FEBUXOSTAT 40 MG/1
40 TABLET, FILM COATED ORAL EVERY EVENING
Qty: 30 TABLET | Refills: 0 | Status: SHIPPED | OUTPATIENT
Start: 2018-07-22 | End: 2018-09-10

## 2018-07-22 RX ORDER — TRAZODONE HYDROCHLORIDE 150 MG/1
150 TABLET ORAL AT BEDTIME
Qty: 60 TABLET | Refills: 0 | Status: SHIPPED | OUTPATIENT
Start: 2018-07-22 | End: 2018-07-27

## 2018-07-22 RX ORDER — POTASSIUM CHLORIDE 750 MG/1
40 TABLET, EXTENDED RELEASE ORAL DAILY
Status: DISCONTINUED | OUTPATIENT
Start: 2018-07-23 | End: 2018-07-23 | Stop reason: HOSPADM

## 2018-07-22 RX ORDER — LEVOTHYROXINE SODIUM 150 UG/1
150 TABLET ORAL DAILY
Qty: 30 TABLET | Refills: 0 | Status: SHIPPED | OUTPATIENT
Start: 2018-07-23 | End: 2018-08-23

## 2018-07-22 RX ORDER — ATORVASTATIN CALCIUM 40 MG/1
40 TABLET, FILM COATED ORAL DAILY
Qty: 30 TABLET | Refills: 0 | Status: ON HOLD | OUTPATIENT
Start: 2018-07-22 | End: 2019-05-02

## 2018-07-22 RX ORDER — FOLIC ACID 0.8 MG
800 TABLET ORAL DAILY
Qty: 30 TABLET | Refills: 0 | Status: SHIPPED | OUTPATIENT
Start: 2018-07-23 | End: 2018-08-22

## 2018-07-22 RX ORDER — PREGABALIN 100 MG/1
100 CAPSULE ORAL 2 TIMES DAILY
Qty: 90 CAPSULE | Refills: 0 | Status: SHIPPED | OUTPATIENT
Start: 2018-07-22 | End: 2018-12-24

## 2018-07-22 RX ORDER — PANTOPRAZOLE SODIUM 40 MG/1
40 TABLET, DELAYED RELEASE ORAL
Qty: 30 TABLET | Refills: 0 | Status: SHIPPED | OUTPATIENT
Start: 2018-07-23 | End: 2018-07-23

## 2018-07-22 RX ORDER — HYDRALAZINE HYDROCHLORIDE 25 MG/1
75 TABLET, FILM COATED ORAL 3 TIMES DAILY
Qty: 270 TABLET | Refills: 0 | Status: SHIPPED | OUTPATIENT
Start: 2018-07-22 | End: 2018-08-06

## 2018-07-22 RX ADMIN — POTASSIUM CHLORIDE 10 MEQ: 10 INJECTION, SOLUTION INTRAVENOUS at 09:55

## 2018-07-22 RX ADMIN — POTASSIUM CHLORIDE 10 MEQ: 10 INJECTION, SOLUTION INTRAVENOUS at 11:10

## 2018-07-22 RX ADMIN — POTASSIUM CHLORIDE 10 MEQ: 10 INJECTION, SOLUTION INTRAVENOUS at 13:34

## 2018-07-22 RX ADMIN — SODIUM CHLORIDE 10 ML: 0.9 INJECTION, SOLUTION INTRAVENOUS at 11:11

## 2018-07-22 RX ADMIN — ACETAMINOPHEN 975 MG: 325 TABLET, FILM COATED ORAL at 13:35

## 2018-07-22 RX ADMIN — METOPROLOL TARTRATE 75 MG: 25 TABLET, FILM COATED ORAL at 08:33

## 2018-07-22 RX ADMIN — PANTOPRAZOLE SODIUM 40 MG: 40 TABLET, DELAYED RELEASE ORAL at 06:56

## 2018-07-22 RX ADMIN — Medication 3 MG: at 21:55

## 2018-07-22 RX ADMIN — LIDOCAINE 3 PATCH: 560 PATCH PERCUTANEOUS; TOPICAL; TRANSDERMAL at 21:56

## 2018-07-22 RX ADMIN — TRAZODONE HYDROCHLORIDE 150 MG: 50 TABLET ORAL at 21:56

## 2018-07-22 RX ADMIN — NIACIN ER 500 MG TABLET,EXTENDED RELEASE 500 MG: at 21:55

## 2018-07-22 RX ADMIN — POLYETHYLENE GLYCOL 3350 17 G: 17 POWDER, FOR SOLUTION ORAL at 08:32

## 2018-07-22 RX ADMIN — INSULIN HUMAN 35 UNITS: 100 INJECTION, SUSPENSION SUBCUTANEOUS at 08:45

## 2018-07-22 RX ADMIN — HYDRALAZINE HYDROCHLORIDE 75 MG: 50 TABLET ORAL at 13:34

## 2018-07-22 RX ADMIN — INSULIN HUMAN 35 UNITS: 100 INJECTION, SUSPENSION SUBCUTANEOUS at 19:41

## 2018-07-22 RX ADMIN — POTASSIUM CHLORIDE 10 MEQ: 10 INJECTION, SOLUTION INTRAVENOUS at 15:28

## 2018-07-22 RX ADMIN — PREGABALIN 100 MG: 100 CAPSULE ORAL at 21:55

## 2018-07-22 RX ADMIN — REPAGLINIDE 1 MG: 1 TABLET ORAL at 13:34

## 2018-07-22 RX ADMIN — REPAGLINIDE 1 MG: 1 TABLET ORAL at 19:02

## 2018-07-22 RX ADMIN — DIPHENHYDRAMINE HYDROCHLORIDE AND LIDOCAINE HYDROCHLORIDE AND ALUMINUM HYDROXIDE AND MAGNESIUM HYDRO 10 ML: KIT at 21:53

## 2018-07-22 RX ADMIN — Medication: at 08:58

## 2018-07-22 RX ADMIN — ACETAMINOPHEN 800 MCG: 400 TABLET ORAL at 08:33

## 2018-07-22 RX ADMIN — LEVOTHYROXINE SODIUM 150 MCG: 25 TABLET ORAL at 08:33

## 2018-07-22 RX ADMIN — ASPIRIN 325 MG: 325 TABLET, DELAYED RELEASE ORAL at 08:33

## 2018-07-22 RX ADMIN — FEBUXOSTAT 40 MG: 40 TABLET ORAL at 21:55

## 2018-07-22 RX ADMIN — ACETAMINOPHEN 975 MG: 325 TABLET, FILM COATED ORAL at 06:56

## 2018-07-22 RX ADMIN — INSULIN ASPART 1 UNITS: 100 INJECTION, SOLUTION INTRAVENOUS; SUBCUTANEOUS at 08:45

## 2018-07-22 RX ADMIN — CHOLECALCIFEROL CAP 125 MCG (5000 UNIT) 10000 UNITS: 125 CAP at 08:32

## 2018-07-22 RX ADMIN — HYDRALAZINE HYDROCHLORIDE 75 MG: 50 TABLET ORAL at 21:57

## 2018-07-22 RX ADMIN — ACETAMINOPHEN 975 MG: 325 TABLET, FILM COATED ORAL at 22:09

## 2018-07-22 RX ADMIN — ATORVASTATIN CALCIUM 40 MG: 40 TABLET, FILM COATED ORAL at 21:55

## 2018-07-22 RX ADMIN — Medication: at 13:35

## 2018-07-22 RX ADMIN — HYDRALAZINE HYDROCHLORIDE 75 MG: 50 TABLET ORAL at 08:33

## 2018-07-22 RX ADMIN — FLUTICASONE FUROATE AND VILANTEROL TRIFENATATE 1 PUFF: 100; 25 POWDER RESPIRATORY (INHALATION) at 08:34

## 2018-07-22 RX ADMIN — REPAGLINIDE 1 MG: 1 TABLET ORAL at 06:56

## 2018-07-22 RX ADMIN — Medication: at 21:53

## 2018-07-22 RX ADMIN — GUAIFENESIN 600 MG: 600 TABLET, EXTENDED RELEASE ORAL at 21:55

## 2018-07-22 RX ADMIN — GUAIFENESIN 600 MG: 600 TABLET, EXTENDED RELEASE ORAL at 08:33

## 2018-07-22 RX ADMIN — METOPROLOL TARTRATE 75 MG: 25 TABLET, FILM COATED ORAL at 21:57

## 2018-07-22 RX ADMIN — POTASSIUM CHLORIDE 10 MEQ: 10 INJECTION, SOLUTION INTRAVENOUS at 12:16

## 2018-07-22 RX ADMIN — ESCITALOPRAM OXALATE 20 MG: 20 TABLET ORAL at 08:33

## 2018-07-22 RX ADMIN — NIACIN ER 500 MG TABLET,EXTENDED RELEASE 500 MG: at 08:33

## 2018-07-22 RX ADMIN — PREGABALIN 100 MG: 100 CAPSULE ORAL at 08:33

## 2018-07-22 NOTE — PROGRESS NOTES
SPIRITUAL HEALTH SERVICES  SPIRITUAL ASSESSMENT Progress Note  [facility] ([facility]) 5R     REFERRAL SOURCE: LOS    Attempted visit, patient was sleeping.     PLAN: follow up 7/23    Talisha Murphy   Intern  Pager [phone]

## 2018-07-22 NOTE — PLAN OF CARE
FOCUS/GOAL  Bowel management, Bladder management, Pain management, Skin integrity and Cognition/Memory/Judgment/Problem solving    ASSESSMENT, INTERVENTIONS AND CONTINUING PLAN FOR GOAL:  Pt is alert and oriented, denied pain, SOB, or new changes in sensation overnight, PICC in RUE purple line without blood return, Prandin held until pt is going to eat breakfast, low K 2.7 in am, oncoming nurse aware and starting potasium replacement, no further care conerns at this time continue with POC.

## 2018-07-22 NOTE — PROGRESS NOTES
"  No new issues  No cp   No sob   No fever   No chills  No nausea  No vomiting   No loc   Seen with RN     Vital signs:  Temp: 96.6  F (35.9  C) Temp src: Oral BP: 124/50 Pulse: 74 Heart Rate: 72 Resp: 16 SpO2: 95 % O2 Device: None (Room air) Oxygen Delivery: 1 LPM Height: 167.6 cm (5' 6\") Weight: 137.8 kg (303 lb 11.2 oz)  Estimated body mass index is 49.02 kg/(m^2) as calculated from the following:    Height as of this encounter: 1.676 m (5' 6\").    Weight as of this encounter: 137.8 kg (303 lb 11.2 oz).  PERR, EOMI  Neck ; supple. No JVD . No TM  Chest ; clear bilaterally , no rales , or rhonchi   CVS ; RRR, no m/r/g . S1 and S2.   GI ; soft abdomen , non tender , BS positive.   Ext ; edema     Neuro ; alert and oriented .No facial asymmetry     ROUTINE IP LABS (Last four results)  BMP  Recent Labs  Lab 07/22/18  1053 07/22/18  0522 07/21/18  0706 07/20/18  0534 07/19/18  0709   NA  --  141 138 143 140   POTASSIUM 3.7 2.7* 4.5 3.6 4.1   CHLORIDE  --  101 100 107 105   ANTOINETTE  --  9.2 9.3 8.3* 8.6   CO2  --  31 30 29 29   BUN  --  54* 48* 43* 43*   CR  --  1.77* 1.75* 1.50* 1.49*   GLC  --  126* 134* 129* 112*     CBC  Recent Labs  Lab 07/20/18  0534 07/19/18  0709 07/18/18  0740 07/17/18  0749   WBC  --   --  8.6 10.8   RBC  --   --  2.93* 3.23*   HGB 8.3* 9.3* 7.9* 8.6*   HCT  --  29.7* 26.4* 28.8*   MCV  --   --  90 89   MCH  --   --  27.0 26.6   MCHC  --   --  29.9* 29.9*   RDW  --   --  15.6* 15.4*   PLT  --   --  240 235     INRNo lab results found in last 7 days.    A/P ;    Fluid overload : (echo on 7/20  EF 55-60 %. IVC dilated . Right atrial pressure increased ).  DC IV Bumex  Start oral Bumex 2 mg BID on 7/23          CKD: SCr 1.7 today , recheck in am, . replace K/Mg as needed      Acute Blood loss Anemia ; s/p PRBC 7/18, Hgb improved appropriately but now continuing to trend down. Will keep trending to ensure Hgb >8. Consider reimaging of R leg to eval interval change of hematoma      IDDM ; Endocrine " involved . -[phone]  Spoke with endocrine and PMR 7/22  Medications to be reconciled

## 2018-07-22 NOTE — PLAN OF CARE
Problem: Goal/Outcome  Goal: Goal Outcome Summary  Outcome: Improving  FOCUS/GOAL  Pain management, Wound care management and Mobility    ASSESSMENT, INTERVENTIONS AND CONTINUING PLAN FOR GOAL:  Pt is alert and oriented x4, able to direct her cares.   Mod I for functional mobility in room. Up in recliner most of pm.   C/o moderate sternal incisional pain, lower back and left shoulder pain, pain managed with current scheduled medications.   BLE lymphedema wraps intact.   Old chest drain sites has scant amount of drainage, pt was educated how to change dressing and pt participated dressing change.   VSS. O2 sats 93% on RA. Plan for discharge home Monday 7/23/18.

## 2018-07-22 NOTE — PROGRESS NOTES
Diabetes Consult Daily  Progress Note          Assessment/Plan:                           Mariel Ribeiro is a 73 year old female with a history of type 2 diabetes,obesity, COPD, CAD with history of stent placement, CKD stage III, hyperlipidemia,fibromyalgia, anxiety, depression, lymphedema, S/P CABG on 07/ 02/18.      Glucoses are always trending higher at HS (often 200s), yesterday also higher presupper b/c of higher carb lunch (and higher carb supper).      PLAN  -Morning NPH 35 units am ( 0800)  -Evening NPH increased from 30 to 35 units and dosing time moved from 2100 to 1800 (with supper)  -repaglinide 1mg TID with meals   -aspart for correction  high intensity ac and hs  -monitor glucose ac, hs, and 0200          Outpatient diabetes follow up: Pt and SO would like to consider seeing endocrinologist-  number for [facility] included in AVS.  Plan discussed with patient.                    Interval History:   The last 24 hours progress and nursing notes reviewed.  Mariel is feeling fine this morning.  K+ was low on lab this morning and she is getting replacement.  Creatinine fallon to 1.75 yesterday and remains at 1.77 today.  Glucoses trended higher presupper and at HS yesterday (200s).  She ate a relatively higher amount of carbs than usual at both lunch (60g CHO) and supper (80g CHO).  However, on review of glucoses it appears that highest glucose of the day is nearly always at HS, and usually in the 200s.  We discussed option of increasing evening NPH and dosing with supper rather than 2100 vs increasing repaglinide dose at supper.  Mariel opted to make the changes to evening NPH dose.    Aiming for discharge Monday.  She is now done with therapy.        Recent Labs  Lab 07/22/18  0837 07/22/18  0522 07/22/18  0200 07/21/18  2106 07/21/18  1718 07/21/18  1327 07/21/18  0944  07/21/18  0706  07/20/18  0534  07/19/18  0709  07/18/18  1907  07/18/18  0740   GLC  --  126*  --   --    "--   --   --   --  134*  --  129*  --  112*  --  144*  --  144*   *  --  134* 242* 207* 130* 140*  < >  --   < >  --   < >  --   < >  --   < >  --    < > = values in this interval not displayed.            Review of Systems:   See interval hx          Medications:       Active Diet Order      Combination Diet 9832-4950 Calories: High Consistent CHO (4-7 CHO units/meal); Thin Liquids (water, ice chips, juice, milk, gelatin, ice cream, etc)     Physical Exam:  Gen: Alert, sitting on couch eating bfast, NAD   HEENT: NC/AT, mucous membranes are moist  Resp: Unlabored  Neuro:oriented x3, communicating clearly  /50  Pulse 74  Temp 96.6  F (35.9  C) (Oral)  Resp 16  Ht 1.676 m (5' 6\")  Wt 137.8 kg (303 lb 11.2 oz)  SpO2 95%  BMI 49.02 kg/m2           Data:     Lab Results   Component Value Date    A1C 9.3 07/01/2018    A1C 8.2 03/19/2018    A1C 7.3 08/09/2017    A1C 7.2 03/08/2017    A1C 7.3 12/23/2016            Recent Labs   Lab Test  07/22/18   0522  07/21/18   0706   NA  141  138   POTASSIUM  2.7*  4.5   CHLORIDE  101  100   CO2  31  30   ANIONGAP  9  8   GLC  126*  134*   BUN  54*  48*   CR  1.77*  1.75*   ANTOINETTE  9.2  9.3     CBC RESULTS:   Recent Labs   Lab Test  07/20/18   0534  07/19/18   0709  07/18/18   0740   WBC   --    --   8.6   RBC   --    --   2.93*   HGB  8.3*  9.3*  7.9*   HCT   --   29.7*  26.4*   MCV   --    --   90   MCH   --    --   27.0   MCHC   --    --   29.9*   RDW   --    --   15.6*   PLT   --    --   240        Kia Hdz PA-C [phone]  Diabetes Management job code [id number]            "

## 2018-07-22 NOTE — PLAN OF CARE
Problem: Patient Care: Nursing Focus  Goal: Mobility  Outcome: Improving  FOCUS/GOAL  Wound care management, Medical management, Mobility, Skin integrity and Safety management    ASSESSMENT, INTERVENTIONS AND CONTINUING PLAN FOR GOAL:  Pt was A&Ox4 this shift and denied pain Early in shift Pt was found to have K+ level 2.7, Pt was asymptomatic of hypokalemia, K+ proocol was initiated. Pt has incision site with sutures on chest that is approximates and shows no Sx of infection. Pt has a patent double lumen PICC in R arm. Pt had edema wraps on Both LE and a meplex on upper R thigh that is CDI, Pt uses toilet independently.

## 2018-07-23 VITALS
OXYGEN SATURATION: 98 % | BODY MASS INDEX: 47.09 KG/M2 | RESPIRATION RATE: 16 BRPM | WEIGHT: 293 LBS | TEMPERATURE: 96.5 F | HEART RATE: 66 BPM | HEIGHT: 66 IN | SYSTOLIC BLOOD PRESSURE: 125 MMHG | DIASTOLIC BLOOD PRESSURE: 53 MMHG

## 2018-07-23 LAB
GLUCOSE BLDC GLUCOMTR-MCNC: 117 MG/DL (ref 70–99)
GLUCOSE BLDC GLUCOMTR-MCNC: 160 MG/DL (ref 70–99)
GLUCOSE BLDC GLUCOMTR-MCNC: 170 MG/DL (ref 70–99)
MAGNESIUM SERPL-MCNC: 2.2 MG/DL (ref 1.6–2.3)
POTASSIUM SERPL-SCNC: 3.9 MMOL/L (ref 3.4–5.3)

## 2018-07-23 PROCEDURE — 40000193 ZZH STATISTIC PT WARD VISIT: Performed by: PHYSICAL THERAPIST

## 2018-07-23 PROCEDURE — 97140 MANUAL THERAPY 1/> REGIONS: CPT | Mod: GP | Performed by: PHYSICAL THERAPIST

## 2018-07-23 PROCEDURE — 25000132 ZZH RX MED GY IP 250 OP 250 PS 637: Mod: GY | Performed by: PHYSICAL MEDICINE & REHABILITATION

## 2018-07-23 PROCEDURE — 40000193 ZZH STATISTIC PT WARD VISIT: Performed by: STUDENT IN AN ORGANIZED HEALTH CARE EDUCATION/TRAINING PROGRAM

## 2018-07-23 PROCEDURE — 99232 SBSQ HOSP IP/OBS MODERATE 35: CPT | Performed by: INTERNAL MEDICINE

## 2018-07-23 PROCEDURE — A9270 NON-COVERED ITEM OR SERVICE: HCPCS | Mod: GY | Performed by: INTERNAL MEDICINE

## 2018-07-23 PROCEDURE — A9270 NON-COVERED ITEM OR SERVICE: HCPCS | Mod: GY | Performed by: PHYSICIAN ASSISTANT

## 2018-07-23 PROCEDURE — 36592 COLLECT BLOOD FROM PICC: CPT | Performed by: GENERAL PRACTICE

## 2018-07-23 PROCEDURE — A9270 NON-COVERED ITEM OR SERVICE: HCPCS | Mod: GY | Performed by: GENERAL PRACTICE

## 2018-07-23 PROCEDURE — 97110 THERAPEUTIC EXERCISES: CPT | Mod: GP | Performed by: PHYSICAL THERAPIST

## 2018-07-23 PROCEDURE — 84132 ASSAY OF SERUM POTASSIUM: CPT | Performed by: GENERAL PRACTICE

## 2018-07-23 PROCEDURE — 25000131 ZZH RX MED GY IP 250 OP 636 PS 637: Mod: GY | Performed by: PHYSICIAN ASSISTANT

## 2018-07-23 PROCEDURE — 40000133 ZZH STATISTIC OT WARD VISIT

## 2018-07-23 PROCEDURE — 97535 SELF CARE MNGMENT TRAINING: CPT | Mod: GO

## 2018-07-23 PROCEDURE — 25000132 ZZH RX MED GY IP 250 OP 250 PS 637: Mod: GY | Performed by: STUDENT IN AN ORGANIZED HEALTH CARE EDUCATION/TRAINING PROGRAM

## 2018-07-23 PROCEDURE — 25000132 ZZH RX MED GY IP 250 OP 250 PS 637: Mod: GY | Performed by: PHYSICIAN ASSISTANT

## 2018-07-23 PROCEDURE — 00000146 ZZHCL STATISTIC GLUCOSE BY METER IP

## 2018-07-23 PROCEDURE — 25000132 ZZH RX MED GY IP 250 OP 250 PS 637: Mod: GY | Performed by: INTERNAL MEDICINE

## 2018-07-23 PROCEDURE — A9270 NON-COVERED ITEM OR SERVICE: HCPCS | Mod: GY | Performed by: PHYSICAL MEDICINE & REHABILITATION

## 2018-07-23 PROCEDURE — 97116 GAIT TRAINING THERAPY: CPT | Mod: GP | Performed by: STUDENT IN AN ORGANIZED HEALTH CARE EDUCATION/TRAINING PROGRAM

## 2018-07-23 PROCEDURE — 99207 ZZC CDG-MDM COMPONENT: MEETS MODERATE - UP CODED: CPT | Performed by: INTERNAL MEDICINE

## 2018-07-23 PROCEDURE — A9270 NON-COVERED ITEM OR SERVICE: HCPCS | Mod: GY | Performed by: STUDENT IN AN ORGANIZED HEALTH CARE EDUCATION/TRAINING PROGRAM

## 2018-07-23 PROCEDURE — 97530 THERAPEUTIC ACTIVITIES: CPT | Mod: GP | Performed by: STUDENT IN AN ORGANIZED HEALTH CARE EDUCATION/TRAINING PROGRAM

## 2018-07-23 PROCEDURE — 25000132 ZZH RX MED GY IP 250 OP 250 PS 637: Mod: GY | Performed by: GENERAL PRACTICE

## 2018-07-23 PROCEDURE — 83735 ASSAY OF MAGNESIUM: CPT | Performed by: GENERAL PRACTICE

## 2018-07-23 RX ORDER — GUAIFENESIN 600 MG/1
600 TABLET, EXTENDED RELEASE ORAL 2 TIMES DAILY
Status: ON HOLD | COMMUNITY
Start: 2018-07-23 | End: 2019-05-02

## 2018-07-23 RX ADMIN — ESCITALOPRAM OXALATE 20 MG: 20 TABLET ORAL at 07:52

## 2018-07-23 RX ADMIN — Medication: at 08:08

## 2018-07-23 RX ADMIN — METOPROLOL TARTRATE 75 MG: 25 TABLET, FILM COATED ORAL at 07:51

## 2018-07-23 RX ADMIN — ASPIRIN 325 MG: 325 TABLET, DELAYED RELEASE ORAL at 07:51

## 2018-07-23 RX ADMIN — POTASSIUM CHLORIDE 40 MEQ: 750 TABLET, FILM COATED, EXTENDED RELEASE ORAL at 07:52

## 2018-07-23 RX ADMIN — HYDRALAZINE HYDROCHLORIDE 75 MG: 50 TABLET ORAL at 07:52

## 2018-07-23 RX ADMIN — REPAGLINIDE 1 MG: 1 TABLET ORAL at 07:50

## 2018-07-23 RX ADMIN — INSULIN ASPART 2 UNITS: 100 INJECTION, SOLUTION INTRAVENOUS; SUBCUTANEOUS at 08:05

## 2018-07-23 RX ADMIN — PREGABALIN 100 MG: 100 CAPSULE ORAL at 07:52

## 2018-07-23 RX ADMIN — INSULIN HUMAN 35 UNITS: 100 INJECTION, SUSPENSION SUBCUTANEOUS at 08:04

## 2018-07-23 RX ADMIN — LEVOTHYROXINE SODIUM 150 MCG: 25 TABLET ORAL at 07:51

## 2018-07-23 RX ADMIN — BUMETANIDE 2 MG: 2 TABLET ORAL at 07:52

## 2018-07-23 RX ADMIN — ACETAMINOPHEN 800 MCG: 400 TABLET ORAL at 07:52

## 2018-07-23 RX ADMIN — CHOLECALCIFEROL CAP 125 MCG (5000 UNIT) 10000 UNITS: 125 CAP at 07:50

## 2018-07-23 RX ADMIN — FLUTICASONE FUROATE AND VILANTEROL TRIFENATATE 1 PUFF: 100; 25 POWDER RESPIRATORY (INHALATION) at 08:04

## 2018-07-23 RX ADMIN — GUAIFENESIN 600 MG: 600 TABLET, EXTENDED RELEASE ORAL at 07:52

## 2018-07-23 RX ADMIN — PANTOPRAZOLE SODIUM 40 MG: 40 TABLET, DELAYED RELEASE ORAL at 06:34

## 2018-07-23 RX ADMIN — NIACIN ER 500 MG TABLET,EXTENDED RELEASE 500 MG: at 07:50

## 2018-07-23 RX ADMIN — ACETAMINOPHEN 975 MG: 325 TABLET, FILM COATED ORAL at 06:34

## 2018-07-23 NOTE — PROGRESS NOTES
Pt received discharge instructions, reviewed ad verbalized understanding. Pt received discharge medications and denied any current concerns. Pt discharged to home w/ friend at approx 1400.

## 2018-07-23 NOTE — PROGRESS NOTES
No new issues  No cp   No sob   No fever   No chills  No nausea  No vomiting   No loc       Vital signs:  t 98.2 . /80  Weight 302 lb  PERR, EOMI  Neck ; supple. No JVD . No TM  Chest ; occ rhonchi  CVS ; RRR, no m/r/g . S1 and S2.   GI ; soft abdomen , non tender , BS positive.   Ext ; edema   Neuro ; alert and oriented .No facial asymmetry      Labs; 3.9 K , 2.2 mg    INRNo lab results found in last 7 days.     A/P ;     Fluid overload : (echo on 7/20  EF 55-60 %. IVC dilated . Right atrial pressure increased ).  DC IV Bumex  Started oral Bumex 2 mg BID on 7/23  Weight and BP stable   Repeat bmp 3-5 days           CKD: SCr 1.7  . recheck this week       Acute Blood loss Anemia ; s/p PRBC 7/18, Hgb improved appropriately but now continuing to trend down. Will keep trending to ensure Hgb >8. Consider reimaging of R leg to eval interval change of hematoma      IDDM ; Endocrine involved . -199-133  Spoke with endocrine and PMR 7/22. novolog is being stopped on dc as she has no coverage so started on prandin per endocrine    Medications recocniled 7/22  Spoke with PMR 7/23

## 2018-07-23 NOTE — DISCHARGE SUMMARY
[facility]   Acute Rehabilitation Unit  Discharge summary     Date of Admission: 7/13/2018  Date of Discharge: 7/23/2018  Disposition: Home with friend and outpatient cardiac rehab  Primary Care Physician: Rafaela William  Attending physician: Meet Pan MD    discharge diagnosis  #Impaired functional mobility with morbid obesity  #s/p 3 vessel CABG  #Coronary artery disease  #Chronic diastolic heart failure  #COPD  #Diabetes mellitus type II  #Hypothyroidism  #Hypertension  #Dyslipidemia  #Chronic kidney disease, stage III  #Paroxysmal SVT  #Gout, hyperuricemia  #Lymphedema  #Normocytic anemia  #Chronic left shoulder pain    brief summary  Mariel Ribeiro is a 73 year old female with a past medical history of DM2, HTN, CKD3, HLD, COPD, fibromyalgia, anxiety, depression, lymphedema, thyroid cancer s/p resection, and multivessel CAD who presented with with unstable angina. Patient had recurrent chest pain and her CABG was moved to 7/2/2018. On 7/2/2018 she underwent a 3 vessel CABG (LIMA to LAD, SVG to R PDA, and SVG to OMA2).  Patient tolerated the operation well and gradually progressed to the general floor.      The patient was started on ASA, atorvastatin, and metoprolol 75 mg BID (had SVT while on the P21). Patient remained hemodynamically stable. No ACE-I resumed as her Cr was elevated. She was transitioned to bumex 2 mg PO BID with good response for her HFpEF and edema. While hospitalized, she developed dysuria and UC grew Enterobacter cloacae complex, e.coli sensitive to cipro and arrived to the ARU on ciprofloxacin for 7 days. Currently her dysuria has improved. In terms of diabetes she did require an endocrine consult in the main hospital and her last A1C was above 9. Thus far her BG was controlled on both long and short acting insulin.       Currently, she is feeling okay but notices that she has increased left shoulder pain which actually hurts more  than her graft or sternotomy sites. She states that her dysuria has improved as well. In terms of her breathing she has baseline dyspena, but has a significant history of COPD and CAD. She is not on home oxygen and uses a daily inhaler with PRN albuterol. Her goals are to improve her activity tolerance and to get home as soon as possible.    rehabilitaiton course  Mariel was on the acute rehab unit recovering from a coronary artery bypass graft surgery.  Her stay was lengthened due to volume overload requiring IV diuresis to which she responded well.  In terms of her rehab course she progressed while here albeit slowly.  She demonstrates safety with transfers and ambulation using appropriate assistive devices.  She is able to toilet and ambulate independently.  With ambulation she is limited to less than 150 feet with a 4 wheeled walker and occasionally a cane.  She is able to toilet independently but sometimes needed help with cares.  Her pain was reasonably well controlled and her incisional pain improved.  Staples were removed her leg pain improved.  She required the assistance of physical therapy with her lymphedema.  She was also seen by lymphedema specialists while admitted.  Mariel has the assistance of her friend Odalis at home.  Odalis does the majority of the lymphedema wraps as Mariel cannot reach her legs at present.  Mariel demonstrated understanding of her medication regimen and has the assistance of her friend Odalis at home.  Mariel has good support at home via Odalis and her goal to return home is met.  She will require home health PT initially at discharge as she was deemed homebound due to the fact that she requires multiple rest breaks while ambulating likely making cardiac rehab difficult at first.  However she will benefit from cardiac rehab after this initial home PT phase she exhibited no issues with cognition or language she should continue using the 4 wheeled walker for shorter community distances.   She may use wheelchair if she needs to traverse longer community distances.  Mariel will also require home care nursing referral as she may need blood draws within the next 3-5 days prior to medical appointments.  Overall Mariel's progress was slow but she did show gains and will likely continue to show gains with home health PT and outpatient cardiac rehab.    mEDICAL Course  Mariel Ribeiro is a 73 year old female with a past medical history of DM2, HTN, CKD3, HLD, COPD, fibromyalgia, anxiety, depression, lymphedema, thyroid cancer s/p resection, and multivessel CAD who presented with with unstable angina. Patient had recurrent chest pain and her CABG was moved to 7/2/2018. On 7/2/2018 she underwent a 3 vessel CABG (LIMA to LAD, SVG to R PDA, and SVG to OMA2).    #Coronary artery disease - s/p CABG x3  Patient with significant history of CAD in self and family. She has already received 2 stents in 2007. She is presenting with impairment in functional mobility and tasks. 3 vessel CABG performed to LAD, PDA and OMA2.  -Continue sternal precautions as outpatient. Patient verbalized understanding.  -Continue metoprolol 75 mg BID.  -Continue hydralazine 75 mg TID.  -Bumex 2 mg BID.  - mg daily.  -Atorvastatin 40 mg daily.  -Tylenol as needed for pain.  -Hgb trended down while admitted but improved after a blood transfusion. She will need labs drawn 3-5 days after discharge to evaluate her hemoglobin level. She has PCP follow-up on 7/27.     #Left shoulder pain  Likely acute on chronic as she had rotator cuff repair in the distant past on this shoulder. Also, could be worsened from surgical positioning and referred pain.   -Continue tylenol and light exercise with gradual increase in range of motion.  -No joint injection while admitted, but will set up a sports medicine appointment as she may benefit from an injection in the future or discussion of possible interventions. Referral placed.     #Chronic diastolic  heart failure  -Transitioned back to oral medication bumex 2 mg BID.  -She is below her dry weight of 305 on discharge.  -Maintain good BP control. Currently on metoprolol and hydralazine.   -EF of 55%. Echo showed increased IVC and right atrium pressure before successful diuresis. Has cardiology follow-up and CVTS follow up.  -Cr elevated while here, but likely related to volume overload. Recommend BMP in 3-5 days s/p discharge. PCP follow-up on 7/27.     #Normocytic anemia:  -Desired Hgb range 8-10 as she is s/p CABG. Hemoglobin has been stable since transfusion of pRBCs on the ARU. Follow-up CBC and BMP in 3-5 days.     #COPD  -Albuterol nebs and inhaler as needed for shortness of breath or wheezing.  -Fluticasone-vilanterol 1 puff daily.  -Did not require oxygen after first day on ARU.  -Continue CPAP at night.     #Hypothyroidism  Distant history of thyroid resection for thyroid cancer (in 1990s) with resultant hypothyroidism. Continue levothyroxine.  -Levothyroxine 150 mcg daily.     #Hypertension  -Continue metoprolol 75 mg BID.  -Continue hydralazine 75 mg TID.  -Bumex 2 mg BID.     #Dyslipidemia  -Atorvastatin 40 mg daily.  -Niacin 500 mg BID.     #Type II diabetes mellitus  Patient with hard to control diabetes both inpatient and outpatient. A1C over 9 PTA. Endocrine consulted and tailored her regimen as below. Will f/u with endocrine as outpatient for further DM management.  -Regularly check FSBGs at home.  -Prandin 1 mg TID before meals.  -Insulin isophane human (NPH) 35 units in the AM and PM.  -Continue to eat a diabetic friendly diet.  -Lyrica 100 mg BID for neuropathy (and fibromyalgia).     #Chronic kidney disease stage III  -Continue Bumex 2 mg BID. Likely multifactorial, but hypertension, diabetes and heart failure likely all contributing.     #Urinary tract infection, Enterobacter cloacae complex and E.coli  -Resolved with ciprofloxacin. She finished her course while admitted on the ARU.      #Paroxysmal SVT  -Continue metoprolol 75 mg BID.     #Gout, hyperuricemia  -Continue febuxostat 40 mg QHS     #Lymphedema  Patient reports this is worse since surgery. This started in 2007 after her two PCIs for CAD.   -Continue lymphedema wraps at home. Patient reports having supplies at home and her friend Odalis helps with wraps.   -US on ARU showed large hematoma in right leg and no DVT. Consider re-imaging if continued pain.     Adjustment to disability/depression: Continue lexapro 20 mg daily and trazodone 150 mg QHS. Melatonin at night as needed.  dISCHARGE MEDICATIONS   Gema Mariel MONTERROSO   Home Medication Instructions CHINTAN:[id number]    Printed on:07/23/18 6526   Medication Information                      acetaminophen (TYLENOL) 325 MG tablet  Take 2 tablets (650 mg) by mouth every 4 hours as needed for mild pain             albuterol (PROAIR HFA) 108 (90 Base) MCG/ACT Inhaler  Inhale 1-2 puffs into the lungs every 4 hours as needed for wheezing             aspirin  MG tablet  Take 1 tablet by mouth daily.             atorvastatin (LIPITOR) 40 MG tablet  Take 1 tablet (40 mg) by mouth daily             B-D U/F insulin pen needle  USE FOR INSULIN INJECTION             blood glucose monitoring (NO BRAND SPECIFIED) test strip  1 strip by In Vitro route 2 times daily Strips per patient's preference             bumetanide (BUMEX) 2 MG tablet  Take 1 tablet (2 mg) by mouth 2 times daily             cholecalciferol 54889 units CAPS  Take 10,000 Units by mouth daily             EPINEPHrine (EPIPEN/ADRENACLICK/OR ANY BX GENERIC EQUIV) 0.3 MG/0.3ML injection 2-pack  Inject 0.3 mLs (0.3 mg) into the muscle once as needed for anaphylaxis             escitalopram (LEXAPRO) 20 MG tablet  Take 1 tablet (20 mg) by mouth every morning             febuxostat (ULORIC) 40 MG TABS tablet  Take 1 tablet (40 mg) by mouth every evening             folic acid 800 MCG TABS  Take 800 mcg by mouth daily             guaiFENesin  (MUCINEX) 600 MG 12 hr tablet  Take 1 tablet (600 mg) by mouth 2 times daily             hydrALAZINE (APRESOLINE) 25 MG tablet  Take 3 tablets (75 mg) by mouth 3 times daily             insulin isophane human (HUMULIN N PEN) 100 UNIT/ML injection  Inject 35 Units Subcutaneous every 24 hours             insulin isophane human (HUMULIN N PEN) 100 UNIT/ML injection  Inject 35 Units Subcutaneous daily (with dinner)             insulin syringes, disposable, U-100 1 ML MISC  1 each 5 times daily             levothyroxine (SYNTHROID/LEVOTHROID) 150 MCG tablet  Take 1 tablet (150 mcg) by mouth daily             OrderAhead FINEPOINT LANCETS MISC  Use to test blood sugars 2 times daily or as directed.             metoprolol tartrate 75 MG TABS  Take 75 mg by mouth 2 times daily             niacin (SLO-NIACIN) 500 MG CR tablet  Take 1 tablet (500 mg) by mouth 2 times daily             nitroGLYcerin (NITROSTAT) 0.4 MG sublingual tablet  For chest pain place 1 tablet under the tongue every 5 minutes for 3 doses. If symptoms persist 5 minutes after 1st dose call 911.             ONE TOUCH ULTRA (DEVICES) MISC  test blood sugar BID             polyethylene glycol (MIRALAX/GLYCOLAX) Packet  Take 17 g by mouth daily             potassium chloride SA (K-DUR/KLOR-CON M) 20 MEQ CR tablet  Take 2 tablets (40 mEq) by mouth daily             pregabalin (LYRICA) 100 MG capsule  Take 1 capsule (100 mg) by mouth 2 times daily             repaglinide (PRANDIN) 1 MG tablet  Take 1 tablet (1 mg) by mouth 3 times daily (before meals)             senna-docusate (SENOKOT-S;PERICOLACE) 8.6-50 MG per tablet  Take 1-2 tablets by mouth 2 times daily as needed for constipation             traZODone (DESYREL) 150 MG tablet  Take 1 tablet (150 mg) by mouth At Bedtime                 DISCHARGE INSTRUCTIONS AND FOLLOW UP    Discharge Procedure Orders  ENDOCRINOLOGY ADULT REFERRAL     Home care nursing referral   Referral Type: Home Health Therapies & Aides      Home Care PT Referral for Hospital Discharge   Referral Type: Home Health Therapies & Aides     Reason for your hospital stay   Order Comments: Admitted to acute rehab after your heart surgery. A little prolongation of your discharge related to medical issues but now ready to get home.     Activity   Order Comments: Your activity upon discharge: continue sternal precautions. Use walker or cane for support with walking. No driving until advanced by cardiology.   Order Specific Question Answer Comments   Is discharge order? Yes      Monitor and record   Order Comments: blood glucose 4 times a day, before meals and at bedtime  blood pressure and heart rate daily  weight every day       Follow Up and recommended labs and tests   Order Comments: 1) CVTS surgery appointment rescheduled for Tuesday, 7/24 at 1:30 PM.     2) PCP within 2 weeks of discharge. Appointment scheduled for 7/27 at 1:40PM with Dr. William.    3) Endocrine clinic as scheduled July 30th, 12:30.     4) Dr. Stephanie Wolf for heart failure clinic scheduled 8/15/2018.    5) Outpatient sports PM&R with Sean Ramos for left shoulder pain and possible GH joint injection.     Full Code     Diet   Order Comments: Follow this diet upon discharge: diabetic diet [id number] Calories and Consistent carbs (4-7 CHO units/meal). Low salt   Order Specific Question Answer Comments   Is discharge order? Yes         physical examination    Most recent Vital Signs:   Vitals:    07/22/18 1830 07/22/18 2158 07/23/18 0700 07/23/18 0811   BP: 144/54 163/57  125/53   BP Location: Left arm Left arm     Pulse: 66      Resp:       Temp:       TempSrc:       SpO2:       Weight:   137.4 kg (302 lb 14.4 oz)    Height:           Gen: Awake and alert this morning, sitting in recliner with legs elevated.  HEENT: PERRLA, EOMI, normal conjunctivae, moist mucosae, normal speech.  Resp: CTAB, no wheezes, rales or rhonchi.  CVS: RRR, no murmurs rubs gallops.  GI: S NT ND normoactive bowel  sounds  MSK: Spontaneously moving all four limbs. Left shoulder ROM limited by pain.  Neuro: AOx3. Grossly intact strength and sensation. Diffuse weakness c/w deconditioning but improved from baseline exam.  Ext: extremity pulses hard to palpate 2/2 edema. Lymphedema wraps off legs this AM. Sternotomy C/D/I and graft sites healing.  Discharge summary was forwarded to Rafaela William (PCP) at the time of discharge, so as to bridge from hospital to outpatient care.     It was our pleasure to care for Mariel Ribeiro during this hospitalization. Please do not hesitate to contact me should there be questions regarding the hospital course or discharge plan.      Rafi Crawford MD  Physical Medicine & Rehabilitation  Resident Physician, PGY-2    Attestation:  Physician Attestation   I, Meet Pan, saw and evaluated this patient prior to discharge.  I discussed the patient with the resident and/or medical student and agree with plan of care as documented in the note.    OK to discharge today.  K again in normal range today. Feeling fine and no lower blood pressures.   Weight 302 today. Restarting back with bumex 2 PO BID. Had some held after heavier IV.  PICC remove before discharge.   Continue with cardiac rehab eventually but will start with some initial in home RN and PT to build endurance and acclimate.       I personally reviewed vital signs, medications and labs.  I personally spent > 30 minutes on discharge activities.     Meet Pan MD  Date of Service (when I saw the patient): 07/23/18

## 2018-07-23 NOTE — PROGRESS NOTES
Diabetes Consult Daily  Progress Note          Assessment/Plan:                           Mariel Ribeiro is a 73 year old female with a history of type 2 diabetes,obesity, COPD, CAD with history of stent placement, CKD stage III, hyperlipidemia,fibromyalgia, anxiety, depression, lymphedema, S/P CABG on 07/ 02/18.      BG stable in the mid 100s during the day, trending higher in the evening but slight improvement with higher dose of NPH and move to suppertime dosing.      PLAN:  -Morning NPH 35 units am ( 0800)  -Evening NPH 35 units at 1800 (with supper)  -repaglinide 1mg TID with meals   -aspart for correction  high intensity ac and hs-- this can discontinue at discharge.  -monitor glucose ac, hs, and 0200          Outpatient diabetes follow up: Pt and SO would like to consider seeing endocrinologist-  number for [facility] included in AVS.  But recommend pt follow up with PCP and pharmacist within 2-3 weeks of discharge.  Plan discussed with patient (reviewed with Dr. Hameed yesterday).                    Interval History:   The last 24 hours progress and nursing notes reviewed.  Mariel continues to feel well, ready for discharge today.  Last evening we increased NPH dose to 35 units and gave with supper instead of 2100.  There was slight improvement in HS BG with this change.  We reviewed the insulin plan for home, and this was also written out and reviewed with pt in AVS.          Recent Labs  Lab 07/22/18  2200 07/22/18  1855 07/22/18  1320 07/22/18  0837 07/22/18  0522 07/22/18  0200 07/21/18  2106  07/21/18  0706  07/20/18  0534  07/19/18  0709  07/18/18  1907  07/18/18  0740   GLC  --   --   --   --  126*  --   --   --  134*  --  129*  --  112*  --  144*  --  144*   * 126* 139* 142*  --  134* 242*  < >  --   < >  --   < >  --   < >  --   < >  --    < > = values in this interval not displayed.            Review of Systems:   See interval hx          Medications:  "      Active Diet Order      Combination Diet [id number] Calories: High Consistent CHO (4-7 CHO units/meal); Thin Liquids (water, ice chips, juice, milk, gelatin, ice cream, etc)      Diet     Physical Exam:  Gen: Alert, sitting in chair, NAD   HEENT: NC/AT, mucous membranes are moist  Resp: Unlabored  Neuro:oriented x3, communicating clearly  /53  Pulse 66  Temp 96.5  F (35.8  C) (Oral)  Resp 16  Ht 1.676 m (5' 6\")  Wt 137.4 kg (302 lb 14.4 oz)  SpO2 98%  BMI 48.89 kg/m2           Data:     Lab Results   Component Value Date    A1C 9.3 07/01/2018    A1C 8.2 03/19/2018    A1C 7.3 08/09/2017    A1C 7.2 03/08/2017    A1C 7.3 12/23/2016            Recent Labs   Lab Test  07/23/18   0603  07/22/18   1722   07/22/18   0522  07/21/18   0706   NA   --    --    --   141  138   POTASSIUM  3.9  4.6   < >  2.7*  4.5   CHLORIDE   --    --    --   101  100   CO2   --    --    --   31  30   ANIONGAP   --    --    --   9  8   GLC   --    --    --   126*  134*   BUN   --    --    --   54*  48*   CR   --    --    --   1.77*  1.75*   ANTOINETTE   --    --    --   9.2  9.3    < > = values in this interval not displayed.     CBC RESULTS:   Recent Labs   Lab Test  07/20/18   0534  07/19/18   0709  07/18/18   0740   WBC   --    --   8.6   RBC   --    --   2.93*   HGB  8.3*  9.3*  7.9*   HCT   --   29.7*  26.4*   MCV   --    --   90   MCH   --    --   27.0   MCHC   --    --   29.9*   RDW   --    --   15.6*   PLT   --    --   240     Kia Hdz PA-C [phone]  Diabetes Management job code [id number]            "

## 2018-07-23 NOTE — PLAN OF CARE
Problem: PT General Care Plan  Goal: Stairs (PT)  PT Stairs   Mod I for stairs to enter home  Goal: PT Goal 5  PT Goal 5   Physical Therapy Discharge Summary    Reason for therapy discharge:    Discharged to home with home therapy.    Progress towards therapy goal(s). See goals on Care Plan in [facility] electronic health record for goal details.  Goals met    Therapy recommendation(s):    Continued therapy is recommended.  Rationale/Recommendations:  home safety, progression of HEP, build endurance such pt ready for OP CR.

## 2018-07-23 NOTE — PLAN OF CARE
Problem: Goal/Outcome  Goal: Goal Outcome Summary  Outcome: Improving  FOCUS/GOAL  Pain management, Medical management and Discharge planning    ASSESSMENT, INTERVENTIONS AND CONTINUING PLAN FOR GOAL:  K+ 2.7 in am, received replacement total of 50 mEq during the day per PRN. Writer noticed K+ rechecked at 1053 and it was WNL (3.7). Stopped K+ replacement at 1630 and updated Dr. Tavares. Recheck K+ 4.6 at 1722.   Pt Mod I for functional mobility in room using cane, independent w/ toileting tasks.   Had a shower with encouragement, needing min assist to wash back per NA report.    before supper.  at HS. /57 at HS after shower, asymptomatic, scheduled meds given for HTN.   All dressings changed. Picc line dressing and caps changed. Pt reports PT will assess and do BLE lymphedema wraps in am.   Pain managed w/ scheduled medications. Pt sleeping in recliner and elevated legs.   Plan for discharge home tomorrow.

## 2018-07-23 NOTE — PROGRESS NOTES
Plan home today.  K again in normal range today. Feeling fine and no lower blood pressures.   Weight 302 today.  Restarting back with bumex 2 PO BID. Had some held after heavier IV.  PICC remove before discharge.   Mariel will call her partner and confirm  ride.  Will be seeing by lymphedema this AM, will see if they want to do some intense therapy outpatient vs back to her typical routine with home wraps.  Continue with cardiac rehab eventually but will start with some initial in home RN and PT to build endurance and acclimate.  See full discharge summary.

## 2018-07-23 NOTE — PLAN OF CARE
Problem: Patient Care Overview  Goal: Plan of Care/Patient Progress Review  Occupational Therapy Discharge Summary    Reason for therapy discharge:    All goals and outcomes met, no further needs identified.    Progress towards therapy goal(s). See goals on Care Plan in [facility] electronic health record for goal details.  Goals met    Therapy recommendation(s):    No further therapy is recommended. pt is mod I for basic adls and will receive assistance for ralph care, shoes/socks, bathing, tub transfer from roommate as previously. Recommend OT if there is further decline.

## 2018-07-24 ENCOUNTER — OFFICE VISIT (OUTPATIENT)
Dept: CARDIOLOGY | Facility: CLINIC | Age: 73
End: 2018-07-24
Attending: THORACIC SURGERY (CARDIOTHORACIC VASCULAR SURGERY)
Payer: MEDICARE

## 2018-07-24 VITALS
SYSTOLIC BLOOD PRESSURE: 141 MMHG | BODY MASS INDEX: 47.09 KG/M2 | DIASTOLIC BLOOD PRESSURE: 74 MMHG | HEIGHT: 66 IN | OXYGEN SATURATION: 97 % | HEART RATE: 77 BPM | WEIGHT: 293 LBS

## 2018-07-24 DIAGNOSIS — G89.18 ACUTE POST-OPERATIVE PAIN: ICD-10-CM

## 2018-07-24 DIAGNOSIS — Z95.1 S/P CABG (CORONARY ARTERY BYPASS GRAFT): Primary | ICD-10-CM

## 2018-07-24 PROCEDURE — G0463 HOSPITAL OUTPT CLINIC VISIT: HCPCS | Mod: ZF

## 2018-07-24 RX ORDER — LIDOCAINE 50 MG/G
OINTMENT TOPICAL 3 TIMES DAILY
Qty: 5 G | Refills: 0 | Status: SHIPPED | OUTPATIENT
Start: 2018-07-24 | End: 2018-07-24

## 2018-07-24 ASSESSMENT — PAIN SCALES - GENERAL: PAINLEVEL: NO PAIN (0)
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
## 2018-07-24 NOTE — NURSING NOTE
Chief Complaint   Patient presents with     Follow Up For     7/2 Dr. Mason 3 vessel CAB     Vitals were taken and medications were reconciled.   Hallie Fraga MA    1:42 PM
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
## 2018-07-24 NOTE — MR AVS SNAPSHOT
After Visit Summary   7/24/2018    Mariel Ribeiro    MRN: [id number]           Patient Information     Date Of Birth          1945        Visit Information        Provider Department      7/24/2018 1:30 PM [facility]        Today's Diagnoses     S/P CABG (coronary artery bypass graft)    -  1    Acute post-operative pain           Follow-ups after your visit        Follow-up notes from your care team     Return if symptoms worsen or fail to improve.      Your next 10 appointments already scheduled     Jul 27, 2018  1:40 PM CDT   [facility]    [address] [place] [id number] [phone]           Bring a current list of meds and any records pertaining to this visit. For Physicals, please bring immunization records and any forms needing to be filled out. Please arrive 10 minutes early to complete paperwork.            Jul 30, 2018 12:30 PM CDT   (Arrive by 12:15 PM)   RETURN DIABETES with Keven Jaeger MD   [facility] ([facility])    [address] [place] [address] [id number] [phone]            Aug 15, 2018  8:30 AM CDT   Lab with  LAB   [facility] ([facility])    [address] [place] [address] [id number] [phone]            Aug 15, 2018  9:00 AM CDT   (Arrive by 8:45 AM)   RETURN HEART FAILURE with Stephanie Wolf MD   [facility] ([facility])    [address] [place] [address] [id number] [phone]            Aug 15, 2018 10:00 AM CDT   (Arrive by 9:45 AM)   New Patient Visit with Sean Ramos DO   [facility] ([facility])    [address] [place] [address] [id number] [phone]              Who to contact      "If you have questions or need follow up information about today's clinic visit or your schedule please contact [facility] directly at [phone].  Normal or non-critical lab and imaging results will be communicated to you by MyChart, letter or phone within 4 business days after the clinic has received the results. If you do not hear from us within 7 days, please contact the clinic through [facility] or phone. If you have a critical or abnormal lab result, we will notify you by phone as soon as possible.  Submit refill requests through [employer] or call your pharmacy and they will forward the refill request to us. Please allow 3 business days for your refill to be completed.          Additional Information About Your Visit        [facility] Information     [facility] gives you secure access to your electronic health record. If you see a primary care provider, you can also send messages to your care team and make appointments. If you have questions, please call your primary care clinic.  If you do not have a primary care provider, please call [phone] and they will assist you.        Care EveryWhere ID     This is your Care EveryWhere ID. This could be used by other organizations to access your [place] medical records  [id number]        Your Vitals Were     Pulse Height Pulse Oximetry BMI (Body Mass Index)          77 1.676 m (5' 6\") 97% 49.31 kg/m2         Blood Pressure from Last 3 Encounters:   07/24/18 141/74   07/23/18 125/53   07/13/18 143/56    Weight from Last 3 Encounters:   07/24/18 138.6 kg (305 lb 8 oz)   07/23/18 137.4 kg (302 lb 14.4 oz)   07/13/18 139.9 kg (308 lb 6.4 oz)              Today, you had the following     No orders found for display         Where to get your medicines      Some of these will need a paper prescription and others can be bought over the counter.  Ask your nurse if you have questions.     Bring a paper prescription for each of these medications     ketamine (KETALAR) " 5%-gabapentin (NEURONTIN) 8% 5%-8% Gel topical PLO cream          Primary Care Provider Office Phone # Fax #    Rafaela William [phone] [address]  [place] [address]        Equal Access to Services     KJ SALVADOR : Hadii aad ku hadasho Soomaali, waaxda luqadaha, qaybta kaalmada adeegyada, waxay judahin cathi mcintyrekeraabran pena . So [facility] [phone].    ATENCIÓN: Si habla español, tiene a gillespie disposición servicios gratuitos de asistencia lingüística. [facility] [phone].    We comply with applicable federal civil rights laws and [place] laws. We do not discriminate on the basis of race, color, national origin, age, disability, sex, sexual orientation, or gender identity.            Thank you!     Thank you for choosing [facility]  for your care. Our goal is always to provide you with excellent care. Hearing back from our patients is one way we can continue to improve our services. Please take a few minutes to complete the written survey that you may receive in the mail after your visit with us. Thank you!             Your Updated Medication List - Protect others around you: Learn how to safely use, store and throw away your medicines at www.disposemymeds.org.          This list is accurate as of 7/24/18 11:59 PM.  Always use your most recent med list.                   Brand Name Dispense Instructions for use Diagnosis    acetaminophen 325 MG tablet    TYLENOL    100 tablet    Take 2 tablets (650 mg) by mouth every 4 hours as needed for mild pain    S/P CABG x 3, Acute post-operative pain       albuterol 108 (90 Base) MCG/ACT Inhaler    PROAIR HFA    1 Inhaler    Inhale 1-2 puffs into the lungs every 4 hours as needed for wheezing    Chronic obstructive pulmonary disease, unspecified COPD type (H)       aspirin 325 MG EC tablet     30 tablet    Take 1 tablet by mouth daily.        atorvastatin 40 MG tablet    LIPITOR    30 tablet    Take 1 tablet (40 mg) by  mouth daily    Atherosclerosis of native coronary artery without angina pectoris, unspecified whether native or transplanted heart       B-D U/F 31G X 5 MM   Generic drug:  insulin pen needle     100 each    USE FOR INSULIN INJECTION    Type 2 diabetes mellitus with diabetic polyneuropathy, without long-term current use of insulin (H)       blood glucose monitoring test strip    no brand specified    200 each    1 strip by In Vitro route 2 times daily Strips per patient's preference    Type 2 diabetes mellitus with diabetic polyneuropathy, without long-term current use of insulin (H)       bumetanide 2 MG tablet    BUMEX    30 tablet    Take 1 tablet (2 mg) by mouth 2 times daily    Atherosclerosis of native coronary artery without angina pectoris, unspecified whether native or transplanted heart       Cholecalciferol 80588 units Caps     30 capsule    Take 10,000 Units by mouth daily    Type 2 diabetes mellitus with diabetic chronic kidney disease, unspecified CKD stage, unspecified long term insulin use status (H)       EPINEPHrine 0.3 MG/0.3ML injection 2-pack    EPIPEN/ADRENACLICK/or ANY BX GENERIC EQUIV    0.6 mL    Inject 0.3 mLs (0.3 mg) into the muscle once as needed for anaphylaxis    Allergic reaction, subsequent encounter       escitalopram 20 MG tablet    LEXAPRO    30 tablet    Take 1 tablet (20 mg) by mouth every morning    Recurrent major depressive disorder, in partial remission (H)       febuxostat 40 MG Tabs tablet    ULORIC    30 tablet    Take 1 tablet (40 mg) by mouth every evening    Vitamin D deficiency       folic acid 800 MCG Tabs     30 tablet    Take 800 mcg by mouth daily    Atherosclerosis of native coronary artery without angina pectoris, unspecified whether native or transplanted heart       guaiFENesin 600 MG 12 hr tablet    MUCINEX     Take 1 tablet (600 mg) by mouth 2 times daily        hydrALAZINE 25 MG tablet    APRESOLINE    270 tablet    Take 3 tablets (75 mg) by mouth 3 times  daily    Atherosclerosis of native coronary artery without angina pectoris, unspecified whether native or transplanted heart       * insulin isophane human 100 UNIT/ML injection    HumuLIN N PEN    10.5 mL    Inject 35 Units Subcutaneous daily (with dinner)    Type 2 diabetes mellitus with diabetic chronic kidney disease, unspecified CKD stage, unspecified long term insulin use status (H)       * insulin isophane human 100 UNIT/ML injection    HumuLIN N PEN    10.5 mL    Inject 35 Units Subcutaneous every 24 hours    Type 2 diabetes mellitus with diabetic chronic kidney disease, unspecified CKD stage, unspecified long term insulin use status (H)       insulin syringes (disposable) U-100 1 ML Misc     100 each    1 each 5 times daily    Type 2 diabetes mellitus with chronic kidney disease, without long-term current use of insulin, unspecified CKD stage (H)       ketamine (KETALAR) 5%-gabapentin (NEURONTIN) 8% 5%-8% Gel topical PLO cream     30 g    Apply 30 g topically 3 times daily        levothyroxine 150 MCG tablet    SYNTHROID/LEVOTHROID    30 tablet    Take 1 tablet (150 mcg) by mouth daily    Other specified hypothyroidism       the grafterCAN FINEPOINT LANCETS Misc     100 each    Use to test blood sugars 2 times daily or as directed.    Type 2 diabetes mellitus with diabetic polyneuropathy, without long-term current use of insulin (H)       Metoprolol Tartrate 75 MG Tabs     60 tablet    Take 75 mg by mouth 2 times daily    Atherosclerosis of native coronary artery without angina pectoris, unspecified whether native or transplanted heart       niacin 500 MG CR tablet    SLO-NIACIN    60 tablet    Take 1 tablet (500 mg) by mouth 2 times daily    Atherosclerosis of native coronary artery without angina pectoris, unspecified whether native or transplanted heart       nitroGLYcerin 0.4 MG sublingual tablet    NITROSTAT    25 tablet    For chest pain place 1 tablet under the tongue every 5 minutes for 3 doses. If  symptoms persist 5 minutes after 1st dose call 911.    Atherosclerosis of native coronary artery without angina pectoris, unspecified whether native or transplanted heart       ONE TOUCH ULTRA (DEVICES) Misc     1    test blood sugar BID    Type II or unspecified type diabetes mellitus without mention of complication, not stated as uncontrolled       polyethylene glycol Packet    MIRALAX/GLYCOLAX    7 packet    Take 17 g by mouth daily    Atherosclerosis of native coronary artery without angina pectoris, unspecified whether native or transplanted heart       potassium chloride SA 20 MEQ CR tablet    K-DUR/KLOR-CON M    60 tablet    Take 2 tablets (40 mEq) by mouth daily    Atherosclerosis of native coronary artery without angina pectoris, unspecified whether native or transplanted heart       pregabalin 100 MG capsule    LYRICA    90 capsule    Take 1 capsule (100 mg) by mouth 2 times daily    Pain in joint of left shoulder       repaglinide 1 MG tablet    PRANDIN    90 tablet    Take 1 tablet (1 mg) by mouth 3 times daily (before meals)    Type 2 diabetes mellitus with diabetic chronic kidney disease, unspecified CKD stage, unspecified long term insulin use status (H)       senna-docusate 8.6-50 MG per tablet    SENOKOT-S;PERICOLACE    30 tablet    Take 1-2 tablets by mouth 2 times daily as needed for constipation    Atherosclerosis of native coronary artery without angina pectoris, unspecified whether native or transplanted heart       traZODone 150 MG tablet    DESYREL    60 tablet    Take 1 tablet (150 mg) by mouth At Bedtime    Recurrent major depressive disorder, in partial remission (H)       * Notice:  This list has 2 medication(s) that are the same as other medications prescribed for you. Read the directions carefully, and ask your doctor or other care provider to review them with you.

## 2018-07-25 ENCOUNTER — DOCUMENTATION ONLY (OUTPATIENT)
Dept: CARE COORDINATION | Facility: CLINIC | Age: 73
End: 2018-07-25

## 2018-07-25 NOTE — PROGRESS NOTES
IRF-[facility] CLARIFICATION NOTE FOR DISCHARGE  Lowest score for each FIM item supported by available charting 7/23/2018    Eating: FIM 6; patient consistently eating independently, dentures  Grooming: FIM 6; per OT, performing modified independent  Bathing: FIM 1--did not occur this date  Upper Body Dressing: FIM 6--per charting, patient performing modified independent  Lower Body Dressing: FIM 4; patient needing assistance with socks, but able to don shoes  Toileting: FIM 6; patient modified independent in room  Bladder:  FIM 6; patient wearing pad  Bladder Number of Accidents: 0  Bowel: FIM 6; patient taking medications  Bowel Number of Accidents: 0  Transfer: FIM 6; patient modified independent  Toilet transfer: FIM 6; patient modified independent  Tub/shower transfer: FIM 1; activity did not occur this date  Locomotion distance: Less than 150 feet  Locomotion: FIM 5; household independent  Stairs: FIM 5; household independent  Comprehension: FIM 7; no deficits  Expression: FIM 7; independent  Social Interaction: FIM 6; medications  Problem solving: FIM 7; indepnedent  Memory: FIM 7; independent    Clarification of quality measures:  Rolling, sit to supine, supine to sit:  88--patient sleeping in recliner due to medical issues

## 2018-07-25 NOTE — PROGRESS NOTES
Agree with orders.     I do not see a reason for discontinuing the tizanidine.  Should be ok to take it.      If she is not having heartburn, then leave off the omeprazole.  If she is having heartburn, then keep it going.  Thanks.

## 2018-07-25 NOTE — PROGRESS NOTES
[facility] now requests orders and shares plan of care/discharge summaries for some patients through [employer].  Please REPLY TO THIS MESSAGE OR ROUTE BACK TO THE AUTHOR in order to give authorization for orders when needed.  This is considered a verbal order, you will still receive a faxed copy of orders for signature.  Thank you for your assistance in improving collaboration for our patients.    ORDERS:  SN 1xwx1, 2xwx2, 1xwx3 and 3 PRN for medication education, cardiac/resp, pain, GI/ assessments, incision cares.  PT/OT evals. HHA 2xwx6 for bathing and personal cares.    Pt was previously on PRN tizanidine 4-8mg 3x daily PRN, but this was discontinued while in hospital and TCU. Mariel is wondering if she can take it for her restless legs?  Pt also sent home with full bottle of protonix 40mg daily, but on her [facility] discharge paperwork is said to stop taking. Mariel is wondering if she needs to take this since it was just filled?  Mariel has a f/u appt with you this Friday.    Thank you,  Radha Mccracken. [facility] RN  [phone]

## 2018-07-27 ENCOUNTER — OFFICE VISIT (OUTPATIENT)
Dept: FAMILY MEDICINE | Facility: CLINIC | Age: 73
End: 2018-07-27
Payer: MEDICARE

## 2018-07-27 VITALS
DIASTOLIC BLOOD PRESSURE: 75 MMHG | OXYGEN SATURATION: 99 % | HEART RATE: 86 BPM | SYSTOLIC BLOOD PRESSURE: 146 MMHG | TEMPERATURE: 97.8 F

## 2018-07-27 DIAGNOSIS — Z79.4 TYPE 2 DIABETES MELLITUS WITH STAGE 3 CHRONIC KIDNEY DISEASE, WITH LONG-TERM CURRENT USE OF INSULIN (H): ICD-10-CM

## 2018-07-27 DIAGNOSIS — E11.22 TYPE 2 DIABETES MELLITUS WITH STAGE 3 CHRONIC KIDNEY DISEASE, WITH LONG-TERM CURRENT USE OF INSULIN (H): ICD-10-CM

## 2018-07-27 DIAGNOSIS — E89.0 POSTOPERATIVE HYPOTHYROIDISM: ICD-10-CM

## 2018-07-27 DIAGNOSIS — I25.10 CORONARY ARTERY DISEASE INVOLVING NATIVE CORONARY ARTERY OF NATIVE HEART WITHOUT ANGINA PECTORIS: Primary | ICD-10-CM

## 2018-07-27 DIAGNOSIS — I50.30 (HFPEF) HEART FAILURE WITH PRESERVED EJECTION FRACTION (H): ICD-10-CM

## 2018-07-27 DIAGNOSIS — F51.01 PRIMARY INSOMNIA: ICD-10-CM

## 2018-07-27 DIAGNOSIS — N18.30 TYPE 2 DIABETES MELLITUS WITH STAGE 3 CHRONIC KIDNEY DISEASE, WITH LONG-TERM CURRENT USE OF INSULIN (H): ICD-10-CM

## 2018-07-27 DIAGNOSIS — D64.9 NORMOCYTIC ANEMIA: ICD-10-CM

## 2018-07-27 DIAGNOSIS — Z95.1 S/P CABG X 3: ICD-10-CM

## 2018-07-27 DIAGNOSIS — B37.31 CANDIDIASIS OF VULVA AND VAGINA: ICD-10-CM

## 2018-07-27 DIAGNOSIS — I89.0 LYMPHEDEMA: ICD-10-CM

## 2018-07-27 DIAGNOSIS — F33.41 RECURRENT MAJOR DEPRESSIVE DISORDER, IN PARTIAL REMISSION (H): ICD-10-CM

## 2018-07-27 DIAGNOSIS — J44.9 CHRONIC OBSTRUCTIVE PULMONARY DISEASE, UNSPECIFIED COPD TYPE (H): ICD-10-CM

## 2018-07-27 LAB
ERYTHROCYTE [DISTWIDTH] IN BLOOD BY AUTOMATED COUNT: 14.5 % (ref 10–15)
HCT VFR BLD AUTO: 34.2 % (ref 35–47)
HGB BLD-MCNC: 10 G/DL (ref 11.7–15.7)
MCH RBC QN AUTO: 26.2 PG (ref 26.5–33)
MCHC RBC AUTO-ENTMCNC: 29.2 G/DL (ref 31.5–36.5)
MCV RBC AUTO: 90 FL (ref 78–100)
PLATELET # BLD AUTO: 185 10E9/L (ref 150–450)
RBC # BLD AUTO: 3.82 10E12/L (ref 3.8–5.2)
SPECIMEN SOURCE: ABNORMAL
WBC # BLD AUTO: 8.7 10E9/L (ref 4–11)
WET PREP SPEC: ABNORMAL

## 2018-07-27 PROCEDURE — 36415 COLL VENOUS BLD VENIPUNCTURE: CPT | Performed by: FAMILY MEDICINE

## 2018-07-27 PROCEDURE — 84550 ASSAY OF BLOOD/URIC ACID: CPT | Performed by: FAMILY MEDICINE

## 2018-07-27 PROCEDURE — 83735 ASSAY OF MAGNESIUM: CPT | Performed by: FAMILY MEDICINE

## 2018-07-27 PROCEDURE — 85027 COMPLETE CBC AUTOMATED: CPT | Performed by: FAMILY MEDICINE

## 2018-07-27 PROCEDURE — 87210 SMEAR WET MOUNT SALINE/INK: CPT | Performed by: FAMILY MEDICINE

## 2018-07-27 PROCEDURE — 80053 COMPREHEN METABOLIC PANEL: CPT | Performed by: FAMILY MEDICINE

## 2018-07-27 PROCEDURE — 99214 OFFICE O/P EST MOD 30 MIN: CPT | Performed by: FAMILY MEDICINE

## 2018-07-27 RX ORDER — FLUCONAZOLE 150 MG/1
150 TABLET ORAL
Qty: 4 TABLET | Refills: 0 | Status: SHIPPED | OUTPATIENT
Start: 2018-07-27 | End: 2018-08-15

## 2018-07-27 RX ORDER — TRAZODONE HYDROCHLORIDE 50 MG/1
150 TABLET, FILM COATED ORAL AT BEDTIME
Qty: 270 TABLET | Refills: 1 | Status: SHIPPED | OUTPATIENT
Start: 2018-07-27 | End: 2019-03-11

## 2018-07-27 RX ORDER — CLOTRIMAZOLE 1 %
CREAM (GRAM) TOPICAL 2 TIMES DAILY
Qty: 30 G | Refills: 1 | Status: SHIPPED | OUTPATIENT
Start: 2018-07-27 | End: 2018-08-20

## 2018-07-27 NOTE — MR AVS SNAPSHOT
After Visit Summary   7/27/2018    Mariel Ribeiro    MRN: [id number]           Patient Information     Date Of Birth          1945        Visit Information        Provider Department      7/27/2018 1:40 PM Rafaela William MD [facility]        Today's Diagnoses     Coronary artery disease involving native coronary artery of native heart without angina pectoris    -  1    S/P CABG x 3        (HFpEF) heart failure with preserved ejection fraction (H)        Candidiasis of vulva and vagina        Postoperative hypothyroidism        Primary insomnia        Chronic obstructive pulmonary disease, unspecified COPD type (H)        Type 2 diabetes mellitus with stage 3 chronic kidney disease, with long-term current use of insulin (H)        Recurrent major depressive disorder, in partial remission (H)        Lymphedema        Normocytic anemia           Follow-ups after your visit        Your next 10 appointments already scheduled     Jul 30, 2018 12:30 PM CDT   (Arrive by 12:15 PM)   RETURN DIABETES with Keven Jaeger MD   [facility] ([facility])    [address]  3rd Floor  [place] [id number] [phone]            Aug 15, 2018  8:30 AM CDT   Lab with [facility]   [facility] ([facility])    [address]  1st Floor  [facility] [facility] [place] [id number] [phone]            Aug 15, 2018  9:00 AM CDT   (Arrive by 8:45 AM)   RETURN HEART FAILURE with Stephanie Wolf MD   [facility] ([facility])    [address]  [address]  [facility] [place] [id number] [phone]            Aug 15, 2018 10:00 AM CDT   (Arrive by 9:45 AM)   New Patient Visit with Sean Ramos DO   [facility] ([facility])    [address]  3rd Floor  [facility] [facility] [place] [id number] [phone]              Who to  contact     If you have questions or need follow up information about today's clinic visit or your schedule please contact [facility] directly at [phone].  Normal or non-critical lab and imaging results will be communicated to you by [employer], letter or phone within 4 business days after the clinic has received the results. If you do not hear from us within 7 days, please contact the clinic through [employer] or phone. If you have a critical or abnormal lab result, we will notify you by phone as soon as possible.  Submit refill requests through [employer] or call your pharmacy and they will forward the refill request to us. Please allow 3 business days for your refill to be completed.          Additional Information About Your Visit        [employer] Information     [employer] gives you secure access to your electronic health record. If you see a primary care provider, you can also send messages to your care team and make appointments. If you have questions, please call your primary care clinic.  If you do not have a primary care provider, please call [phone] and they will assist you.        Care EveryWhere ID     This is your Care EveryWhere ID. This could be used by other organizations to access your [facility] medical records  [id number]        Your Vitals Were     Pulse Temperature Pulse Oximetry             86 97.8  F (36.6  C) (Oral) 99%          Blood Pressure from Last 3 Encounters:   07/27/18 146/75   07/24/18 141/74   07/23/18 125/53    Weight from Last 3 Encounters:   07/24/18 305 lb 8 oz (138.6 kg)   07/23/18 302 lb 14.4 oz (137.4 kg)   07/13/18 308 lb 6.4 oz (139.9 kg)              We Performed the Following     CBC with platelets     Comprehensive metabolic panel     Magnesium     Uric acid     Wet prep          Today's Medication Changes          These changes are accurate as of 7/27/18 11:59 PM.  If you have any questions, ask your nurse or doctor.               Start taking these  medicines.        Dose/Directions    clotrimazole 1 % cream   Commonly known as:  LOTRIMIN   Used for:  Candidiasis of vulva and vagina   Started by:  Rafaela William MD        Apply topically 2 times daily   Quantity:  30 g   Refills:  1       fluconazole 150 MG tablet   Commonly known as:  DIFLUCAN   Used for:  Candidiasis of vulva and vagina   Started by:  Rafaela William MD        Dose:  150 mg   Take 1 tablet (150 mg) by mouth every 3 days   Quantity:  4 tablet   Refills:  0         These medicines have changed or have updated prescriptions.        Dose/Directions    traZODone 50 MG tablet   Commonly known as:  DESYREL   This may have changed:  medication strength   Used for:  Recurrent major depressive disorder, in partial remission (H)   Changed by:  Rafaela William MD        Dose:  150 mg   Take 3 tablets (150 mg) by mouth At Bedtime   Quantity:  270 tablet   Refills:  1            Where to get your medicines      These medications were sent to [id number] - [place], [place] - [address] AT [facility] [address]  [facility] [address], [place] [address]    Hours:  24-hours Phone:  [phone]     clotrimazole 1 % cream    fluconazole 150 MG tablet    traZODone 50 MG tablet                Primary Care Provider Office Phone # Fax #    Rafaela William [phone] [address] [place] [address]        Equal Access to Services     [facility] CenterAMAD[facility] AH: Hadii aad ku hadasho Soomaali, waaxda luqadaha, qaybta kaalmada adeegyada, waxay judahin hayaan mercy pena . So [facility] [phone].    ATENCIÓN: Si habla español, tiene a gillespie disposición servicios gratuitos de asistencia lingüística. Llame al [phone].    We comply with applicable federal civil rights laws and [place] laws. We do not discriminate on the basis of race, color, national origin, age, disability, sex, sexual orientation, or gender identity.            Thank you!      Thank you for choosing [facility]  for your care. Our goal is always to provide you with excellent care. Hearing back from our patients is one way we can continue to improve our services. Please take a few minutes to complete the written survey that you may receive in the mail after your visit with us. Thank you!             Your Updated Medication List - Protect others around you: Learn how to safely use, store and throw away your medicines at www.disposemymeds.org.          This list is accurate as of 7/27/18 11:59 PM.  Always use your most recent med list.                   Brand Name Dispense Instructions for use Diagnosis    acetaminophen 325 MG tablet    TYLENOL    100 tablet    Take 2 tablets (650 mg) by mouth every 4 hours as needed for mild pain    S/P CABG x 3, Acute post-operative pain       albuterol 108 (90 Base) MCG/ACT Inhaler    PROAIR HFA    1 Inhaler    Inhale 1-2 puffs into the lungs every 4 hours as needed for wheezing    Chronic obstructive pulmonary disease, unspecified COPD type (H)       aspirin 325 MG EC tablet     30 tablet    Take 1 tablet by mouth daily.        atorvastatin 40 MG tablet    LIPITOR    30 tablet    Take 1 tablet (40 mg) by mouth daily    Atherosclerosis of native coronary artery without angina pectoris, unspecified whether native or transplanted heart       B-D U/F 31G X 5 MM   Generic drug:  insulin pen needle     100 each    USE FOR INSULIN INJECTION    Type 2 diabetes mellitus with diabetic polyneuropathy, without long-term current use of insulin (H)       blood glucose monitoring test strip    no brand specified    200 each    1 strip by In Vitro route 2 times daily Strips per patient's preference    Type 2 diabetes mellitus with diabetic polyneuropathy, without long-term current use of insulin (H)       bumetanide 2 MG tablet    BUMEX    30 tablet    Take 1 tablet (2 mg) by mouth 2 times daily    Atherosclerosis of native coronary artery without  angina pectoris, unspecified whether native or transplanted heart       Cholecalciferol 06677 units Caps     30 capsule    Take 10,000 Units by mouth daily    Type 2 diabetes mellitus with diabetic chronic kidney disease, unspecified CKD stage, unspecified long term insulin use status (H)       clotrimazole 1 % cream    LOTRIMIN    30 g    Apply topically 2 times daily    Candidiasis of vulva and vagina       EPINEPHrine 0.3 MG/0.3ML injection 2-pack    EPIPEN/ADRENACLICK/or ANY BX GENERIC EQUIV    0.6 mL    Inject 0.3 mLs (0.3 mg) into the muscle once as needed for anaphylaxis    Allergic reaction, subsequent encounter       escitalopram 20 MG tablet    LEXAPRO    30 tablet    Take 1 tablet (20 mg) by mouth every morning    Recurrent major depressive disorder, in partial remission (H)       febuxostat 40 MG Tabs tablet    ULORIC    30 tablet    Take 1 tablet (40 mg) by mouth every evening    Vitamin D deficiency       fluconazole 150 MG tablet    DIFLUCAN    4 tablet    Take 1 tablet (150 mg) by mouth every 3 days    Candidiasis of vulva and vagina       folic acid 800 MCG Tabs     30 tablet    Take 800 mcg by mouth daily    Atherosclerosis of native coronary artery without angina pectoris, unspecified whether native or transplanted heart       guaiFENesin 600 MG 12 hr tablet    MUCINEX     Take 1 tablet (600 mg) by mouth 2 times daily        hydrALAZINE 25 MG tablet    APRESOLINE    270 tablet    Take 3 tablets (75 mg) by mouth 3 times daily    Atherosclerosis of native coronary artery without angina pectoris, unspecified whether native or transplanted heart       * insulin isophane human 100 UNIT/ML injection    HumuLIN N PEN    10.5 mL    Inject 35 Units Subcutaneous daily (with dinner)    Type 2 diabetes mellitus with diabetic chronic kidney disease, unspecified CKD stage, unspecified long term insulin use status (H)       * insulin isophane human 100 UNIT/ML injection    HumuLIN N PEN    10.5 mL    Inject 35  Units Subcutaneous every 24 hours    Type 2 diabetes mellitus with diabetic chronic kidney disease, unspecified CKD stage, unspecified long term insulin use status (H)       insulin syringes (disposable) U-100 1 ML Misc     100 each    1 each 5 times daily    Type 2 diabetes mellitus with chronic kidney disease, without long-term current use of insulin, unspecified CKD stage (H)       ketamine (KETALAR) 5%-gabapentin (NEURONTIN) 8% 5%-8% Gel topical PLO cream     30 g    Apply 30 g topically 3 times daily        levothyroxine 150 MCG tablet    SYNTHROID/LEVOTHROID    30 tablet    Take 1 tablet (150 mcg) by mouth daily    Other specified hypothyroidism       [employer] FINEPOINT LANCETS Misc     100 each    Use to test blood sugars 2 times daily or as directed.    Type 2 diabetes mellitus with diabetic polyneuropathy, without long-term current use of insulin (H)       Metoprolol Tartrate 75 MG Tabs     60 tablet    Take 75 mg by mouth 2 times daily    Atherosclerosis of native coronary artery without angina pectoris, unspecified whether native or transplanted heart       niacin 500 MG CR tablet    SLO-NIACIN    60 tablet    Take 1 tablet (500 mg) by mouth 2 times daily    Atherosclerosis of native coronary artery without angina pectoris, unspecified whether native or transplanted heart       nitroGLYcerin 0.4 MG sublingual tablet    NITROSTAT    25 tablet    For chest pain place 1 tablet under the tongue every 5 minutes for 3 doses. If symptoms persist 5 minutes after 1st dose call 911.    Atherosclerosis of native coronary artery without angina pectoris, unspecified whether native or transplanted heart       ONE TOUCH ULTRA (DEVICES) Misc     1    test blood sugar BID    Type II or unspecified type diabetes mellitus without mention of complication, not stated as uncontrolled       polyethylene glycol Packet    MIRALAX/GLYCOLAX    7 packet    Take 17 g by mouth daily    Atherosclerosis of native coronary artery without  angina pectoris, unspecified whether native or transplanted heart       potassium chloride SA 20 MEQ CR tablet    K-DUR/KLOR-CON M    60 tablet    Take 2 tablets (40 mEq) by mouth daily    Atherosclerosis of native coronary artery without angina pectoris, unspecified whether native or transplanted heart       pregabalin 100 MG capsule    LYRICA    90 capsule    Take 1 capsule (100 mg) by mouth 2 times daily    Pain in joint of left shoulder       repaglinide 1 MG tablet    PRANDIN    90 tablet    Take 1 tablet (1 mg) by mouth 3 times daily (before meals)    Type 2 diabetes mellitus with diabetic chronic kidney disease, unspecified CKD stage, unspecified long term insulin use status (H)       senna-docusate 8.6-50 MG per tablet    SENOKOT-S;PERICOLACE    30 tablet    Take 1-2 tablets by mouth 2 times daily as needed for constipation    Atherosclerosis of native coronary artery without angina pectoris, unspecified whether native or transplanted heart       traZODone 50 MG tablet    DESYREL    270 tablet    Take 3 tablets (150 mg) by mouth At Bedtime    Recurrent major depressive disorder, in partial remission (H)       * Notice:  This list has 2 medication(s) that are the same as other medications prescribed for you. Read the directions carefully, and ask your doctor or other care provider to review them with you.

## 2018-07-28 PROBLEM — N18.30 TYPE 2 DIABETES MELLITUS WITH STAGE 3 CHRONIC KIDNEY DISEASE, WITH LONG-TERM CURRENT USE OF INSULIN (H): Status: ACTIVE | Noted: 2018-07-28

## 2018-07-28 PROBLEM — E11.42 TYPE 2 DIABETES MELLITUS WITH DIABETIC POLYNEUROPATHY, WITHOUT LONG-TERM CURRENT USE OF INSULIN (H): Status: RESOLVED | Noted: 2018-03-19 | Resolved: 2018-07-28

## 2018-07-28 PROBLEM — I89.0 LYMPHEDEMA: Status: ACTIVE | Noted: 2018-07-28

## 2018-07-28 PROBLEM — Z79.4 TYPE 2 DIABETES MELLITUS WITH STAGE 3 CHRONIC KIDNEY DISEASE, WITH LONG-TERM CURRENT USE OF INSULIN (H): Status: ACTIVE | Noted: 2018-07-28

## 2018-07-28 PROBLEM — E11.22 TYPE 2 DIABETES MELLITUS WITH STAGE 3 CHRONIC KIDNEY DISEASE, WITH LONG-TERM CURRENT USE OF INSULIN (H): Status: ACTIVE | Noted: 2018-07-28

## 2018-07-28 PROBLEM — I50.30 (HFPEF) HEART FAILURE WITH PRESERVED EJECTION FRACTION (H): Status: ACTIVE | Noted: 2018-07-28

## 2018-07-28 LAB
ALBUMIN SERPL-MCNC: 3.4 G/DL (ref 3.4–5)
ALP SERPL-CCNC: 125 U/L (ref 40–150)
ALT SERPL W P-5'-P-CCNC: 44 U/L (ref 0–50)
ANION GAP SERPL CALCULATED.3IONS-SCNC: 8 MMOL/L (ref 3–14)
AST SERPL W P-5'-P-CCNC: 35 U/L (ref 0–45)
BILIRUB SERPL-MCNC: 0.6 MG/DL (ref 0.2–1.3)
BUN SERPL-MCNC: 40 MG/DL (ref 7–30)
CALCIUM SERPL-MCNC: 9.2 MG/DL (ref 8.5–10.1)
CHLORIDE SERPL-SCNC: 108 MMOL/L (ref 94–109)
CO2 SERPL-SCNC: 25 MMOL/L (ref 20–32)
CREAT SERPL-MCNC: 1.38 MG/DL (ref 0.52–1.04)
GFR SERPL CREATININE-BSD FRML MDRD: 38 ML/MIN/1.7M2
GLUCOSE SERPL-MCNC: 57 MG/DL (ref 70–99)
MAGNESIUM SERPL-MCNC: 1.7 MG/DL (ref 1.6–2.3)
POTASSIUM SERPL-SCNC: 3.7 MMOL/L (ref 3.4–5.3)
PROT SERPL-MCNC: 6.5 G/DL (ref 6.8–8.8)
SODIUM SERPL-SCNC: 141 MMOL/L (ref 133–144)
URATE SERPL-MCNC: 7.8 MG/DL (ref 2.6–6)
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
## 2018-07-30 ENCOUNTER — OFFICE VISIT (OUTPATIENT)
Dept: ENDOCRINOLOGY | Facility: CLINIC | Age: 73
End: 2018-07-30
Payer: MEDICARE

## 2018-07-30 ENCOUNTER — PATIENT OUTREACH (OUTPATIENT)
Dept: CARE COORDINATION | Facility: CLINIC | Age: 73
End: 2018-07-30

## 2018-07-30 VITALS — DIASTOLIC BLOOD PRESSURE: 70 MMHG | SYSTOLIC BLOOD PRESSURE: 113 MMHG | HEART RATE: 75 BPM

## 2018-07-30 DIAGNOSIS — N18.30 TYPE 2 DIABETES MELLITUS WITH STAGE 3 CHRONIC KIDNEY DISEASE, WITH LONG-TERM CURRENT USE OF INSULIN (H): Primary | ICD-10-CM

## 2018-07-30 DIAGNOSIS — E89.0 POSTOPERATIVE HYPOTHYROIDISM: ICD-10-CM

## 2018-07-30 DIAGNOSIS — Z98.890 STATUS POST CORONARY ANGIOGRAM: Primary | ICD-10-CM

## 2018-07-30 DIAGNOSIS — E11.22 TYPE 2 DIABETES MELLITUS WITH STAGE 3 CHRONIC KIDNEY DISEASE, WITH LONG-TERM CURRENT USE OF INSULIN (H): Primary | ICD-10-CM

## 2018-07-30 DIAGNOSIS — Z79.4 TYPE 2 DIABETES MELLITUS WITH STAGE 3 CHRONIC KIDNEY DISEASE, WITH LONG-TERM CURRENT USE OF INSULIN (H): Primary | ICD-10-CM

## 2018-07-30 ASSESSMENT — PAIN SCALES - GENERAL: PAINLEVEL: NO PAIN (0)

## 2018-07-30 NOTE — LETTER
Health Care Home - Access Care Plan    About Me  Patient Name:  Mariel Ribeiro    YOB: 1945  Age:                             73 year old   Destiney MRN:            [id number] Telephone Information:     Home Phone [phone]   Mobile Not on file.       Address:    [address] [place] [address] Email address:  [email or web address]      Emergency Contact(s)  Name Relationship Lgl Grd Work Phone Home Phone Mobile Phone   1. KOSTAS PIERSON Roommate No [phone]   2. VITA ODOM Sister No  [phone]   3. SAKSHI ALCANTAR Friend No  [phone]             Health Maintenance: Routine Health maintenance Reviewed: Not assessed    My Access Plan  Medical Emergency 911   Questions or concerns during clinic hours Primary Clinic Line, I will call the clinic directly: [facility] - [phone]   24 Hour Appointment Line [phone] or  [phone] [place] ([phone]) (toll free)   24 Hour Nurse Line [phone] (toll free)   Questions or concerns outside clinic hours 24 Hour Appointment Line, I will call the after-hours on-call line:   [facility] [phone] or [phone] ([phone]) (toll-free)   Preferred Urgent Care     Preferred Hospital     Preferred Pharmacy [id number] - [place], [place] - [address] AT [facility] [address]     Behavioral Health Crisis Line The National Suicide Prevention Lifeline at 1-441.447.5606 or 917     My Care Team Members  Patient Care Team       Relationship Specialty Notifications Start End    Rafaela William MD PCP - General Family Practice  11/17/15     Phone: [phone] Fax: [phone]         [address] [place] [address]    Karen Hilton RP Pharmacist Pharmacist  9/8/14      [address] [facility] [address]    Zeeshan Hutson MD MD Orthopedics  9/8/14     Phone: [phone] Fax: [phone]          [address]  [facility] [address]    Jefry Hanson DPM   Podiatry  9/8/14     Phone: [phone] Fax: [phone] [address] [facility] [address]    Tom Cruz MD MD Neurology  5/12/15     Phone: [phone] Fax: [phone]         [address] [facility] [facility] [address]    Rosina Kohler MD MD Family Medicine - Sports Medicine  11/21/16     Phone: [phone] Fax: [phone]         [address] [facility] [address]    Jeffrey Roberson MD MD General Surgery  5/22/17     Phone: [phone] Fax: [phone]         [address] [facility] [address] [facility] [address]    Peter Khan RN Nurse Coordinator Cardiology  7/2/18     Stephanie Wolf MD MD Cardiology  7/2/18     Phone: [phone] Fax: [phone]         [address] [facility] [address] [facility] [address]    Senia Durán, RN Clinic Care Coordinator Primary Care - CC  7/17/18     Phone: [phone] Fax: [phone]               My Medical and Care Information  Problem List   Patient Active Problem List   Diagnosis     Hypothyroidism      HX PHYSICAL ABUSE     Coronary atherosclerosis     Myalgia and myositis     Insomnia     Migraine     Chronic airway obstruction/ COPD     Mononeuritis     FAMILY HX-THROMBOSIS     Obstructive sleep apnea     Vitamin D deficiency     Hyperuricemia     Type 2 diabetes mellitus with diabetic chronic kidney disease (H)     Hypertension goal BP (blood pressure) < 130/80     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)     Hyperlipidemia with target LDL less than 70     Chronic diastolic heart failure (H)     Intermediate coronary syndrome (H)     Osteoarthritis     Morbid obesity (H)     Chest pain     Advanced directives, counseling/discussion     Arthritis of knee     Total knee replacement status     Constipation     Hypokalemia     Transient cerebral ischemia     Spells     Pain in joint of left shoulder     History of thyroid cancer      Status post coronary angiogram     Cervicalgia     Cholelithiasis     Pancreatitis, acute     Postoperative state     Fatty liver disease, nonalcoholic     Hepatomegaly     Angina at rest (H)     S/P CABG x 3     (HFpEF) heart failure with preserved ejection fraction (H)     Type 2 diabetes mellitus with stage 3 chronic kidney disease, with long-term current use of insulin (H)     Lymphedema      Current Medications and Allergies:  See printed Medication Report

## 2018-07-30 NOTE — MR AVS SNAPSHOT
After Visit Summary   7/30/2018    Mariel Ribeiro    MRN: [id number]           Patient Information     Date Of Birth          1945        Visit Information        Provider Department      7/30/2018 12:30 PM Keven Jaeger MD [facility]        Care Instructions    Continue NPH 35 units twice a day   Prandin with meals, skip dose if you skip a meal.   If BG < 70 please call us/             Follow-ups after your visit        Follow-up notes from your care team     Return in about 6 months (around 1/30/2019).      Your next 10 appointments already scheduled     Aug 15, 2018  8:30 AM CDT   Lab with  LAB    [facility] ([facility])    [address] [place] [address] [id number] [phone]            Aug 15, 2018  9:00 AM CDT   (Arrive by 8:45 AM)   RETURN HEART FAILURE with Stephanie Wolf MD   [facility] ([facility])    [address] [place] [address] [id number] [phone]            Aug 15, 2018 10:00 AM CDT   (Arrive by 9:45 AM)   New Patient Visit with Sean Ramos DO   [facility] ([facility])    [address] [place] [id number] [phone]            Oct 18, 2018  1:30 PM CDT   (Arrive by 1:15 PM)   RETURN DIABETES with Odalis Medley PA-C   [facility] ([facility])    [address] [place] [address] [id number] [phone]            Jan 14, 2019  2:30 PM CST   (Arrive by 2:15 PM)   RETURN DIABETES with Keven Jaeger MD   [facility] ([facility])    [address] [place] [address] [id number] [phone]              Who to contact     Please call your clinic at [phone] to:    Ask questions about your health    Make or cancel  appointments    Discuss your medicines    Learn about your test results    Speak to your doctor            Additional Information About Your Visit        [facility] Information     [employer] gives you secure access to your electronic health record. If you see a primary care provider, you can also send messages to your care team and make appointments. If you have questions, please call your primary care clinic.  If you do not have a primary care provider, please call [phone] and they will assist you.      [employer] is an electronic gateway that provides easy, online access to your medical records. With [employer], you can request a clinic appointment, read your test results, renew a prescription or communicate with your care team.     To access your existing account, please contact your [facility] or call [phone] for assistance.        Care EveryWhere ID     This is your Care EveryWhere ID. This could be used by other organizations to access your [place] medical records  [id number]        Your Vitals Were     Pulse                   75            Blood Pressure from Last 3 Encounters:   07/30/18 113/70   07/27/18 146/75   07/24/18 141/74    Weight from Last 3 Encounters:   07/24/18 138.6 kg (305 lb 8 oz)   07/23/18 137.4 kg (302 lb 14.4 oz)   07/13/18 139.9 kg (308 lb 6.4 oz)              Today, you had the following     No orders found for display       Primary Care Provider Office Phone # Fax #    Rafaela William [phone]       [address] [place] [address]        Equal Access to Services     KJ SALVADOR : Hadii aad ku hadasho Soomaali, waaxda luqadaha, qaybta kaalmada adeegyada, gopal pena . So [facility] [phone].    ATENCIÓN: Si habla español, tiene a gillespie disposición servicios gratuitos de asistencia lingüística. Llame al [phone].    We comply with applicable federal civil rights laws and [place] laws. We do  not discriminate on the basis of race, color, national origin, age, disability, sex, sexual orientation, or gender identity.            Thank you!     Thank you for choosing [facility] ENDOCRINOLOGY  for your care. Our goal is always to provide you with excellent care. Hearing back from our patients is one way we can continue to improve our services. Please take a few minutes to complete the written survey that you may receive in the mail after your visit with us. Thank you!             Your Updated Medication List - Protect others around you: Learn how to safely use, store and throw away your medicines at www.disposemymeds.org.          This list is accurate as of 7/30/18  1:06 PM.  Always use your most recent med list.                   Brand Name Dispense Instructions for use Diagnosis    acetaminophen 325 MG tablet    TYLENOL    100 tablet    Take 2 tablets (650 mg) by mouth every 4 hours as needed for mild pain    S/P CABG x 3, Acute post-operative pain       albuterol 108 (90 Base) MCG/ACT Inhaler    PROAIR HFA    1 Inhaler    Inhale 1-2 puffs into the lungs every 4 hours as needed for wheezing    Chronic obstructive pulmonary disease, unspecified COPD type (H)       aspirin 325 MG EC tablet     30 tablet    Take 1 tablet by mouth daily.        atorvastatin 40 MG tablet    LIPITOR    30 tablet    Take 1 tablet (40 mg) by mouth daily    Atherosclerosis of native coronary artery without angina pectoris, unspecified whether native or transplanted heart       B-D U/F 31G X 5 MM   Generic drug:  insulin pen needle     100 each    USE FOR INSULIN INJECTION    Type 2 diabetes mellitus with diabetic polyneuropathy, without long-term current use of insulin (H)       blood glucose monitoring test strip    no brand specified    200 each    1 strip by In Vitro route 2 times daily Strips per patient's preference    Type 2 diabetes mellitus with diabetic polyneuropathy, without long-term current use of insulin (H)        bumetanide 2 MG tablet    BUMEX    30 tablet    Take 1 tablet (2 mg) by mouth 2 times daily    Atherosclerosis of native coronary artery without angina pectoris, unspecified whether native or transplanted heart       Cholecalciferol 59390 units Caps     30 capsule    Take 10,000 Units by mouth daily    Type 2 diabetes mellitus with diabetic chronic kidney disease, unspecified CKD stage, unspecified long term insulin use status (H)       clotrimazole 1 % cream    LOTRIMIN    30 g    Apply topically 2 times daily    Candidiasis of vulva and vagina       EPINEPHrine 0.3 MG/0.3ML injection 2-pack    EPIPEN/ADRENACLICK/or ANY BX GENERIC EQUIV    0.6 mL    Inject 0.3 mLs (0.3 mg) into the muscle once as needed for anaphylaxis    Allergic reaction, subsequent encounter       escitalopram 20 MG tablet    LEXAPRO    30 tablet    Take 1 tablet (20 mg) by mouth every morning    Recurrent major depressive disorder, in partial remission (H)       febuxostat 40 MG Tabs tablet    ULORIC    30 tablet    Take 1 tablet (40 mg) by mouth every evening    Vitamin D deficiency       fluconazole 150 MG tablet    DIFLUCAN    4 tablet    Take 1 tablet (150 mg) by mouth every 3 days    Candidiasis of vulva and vagina       folic acid 800 MCG Tabs     30 tablet    Take 800 mcg by mouth daily    Atherosclerosis of native coronary artery without angina pectoris, unspecified whether native or transplanted heart       guaiFENesin 600 MG 12 hr tablet    MUCINEX     Take 1 tablet (600 mg) by mouth 2 times daily        hydrALAZINE 25 MG tablet    APRESOLINE    270 tablet    Take 3 tablets (75 mg) by mouth 3 times daily    Atherosclerosis of native coronary artery without angina pectoris, unspecified whether native or transplanted heart       * insulin isophane human 100 UNIT/ML injection    HumuLIN N PEN    10.5 mL    Inject 35 Units Subcutaneous daily (with dinner)    Type 2 diabetes mellitus with diabetic chronic kidney disease, unspecified CKD  stage, unspecified long term insulin use status (H)       * insulin isophane human 100 UNIT/ML injection    HumuLIN N PEN    10.5 mL    Inject 35 Units Subcutaneous every 24 hours    Type 2 diabetes mellitus with diabetic chronic kidney disease, unspecified CKD stage, unspecified long term insulin use status (H)       insulin syringes (disposable) U-100 1 ML Misc     100 each    1 each 5 times daily    Type 2 diabetes mellitus with chronic kidney disease, without long-term current use of insulin, unspecified CKD stage (H)       ketamine (KETALAR) 5%-gabapentin (NEURONTIN) 8% 5%-8% Gel topical PLO cream     30 g    Apply 30 g topically 3 times daily        levothyroxine 150 MCG tablet    SYNTHROID/LEVOTHROID    30 tablet    Take 1 tablet (150 mcg) by mouth daily    Other specified hypothyroidism       ConnectAnd[employer]ellCAN FINEPOINT LANCETS Misc     100 each    Use to test blood sugars 2 times daily or as directed.    Type 2 diabetes mellitus with diabetic polyneuropathy, without long-term current use of insulin (H)       Metoprolol Tartrate 75 MG Tabs     60 tablet    Take 75 mg by mouth 2 times daily    Atherosclerosis of native coronary artery without angina pectoris, unspecified whether native or transplanted heart       niacin 500 MG CR tablet    SLO-NIACIN    60 tablet    Take 1 tablet (500 mg) by mouth 2 times daily    Atherosclerosis of native coronary artery without angina pectoris, unspecified whether native or transplanted heart       nitroGLYcerin 0.4 MG sublingual tablet    NITROSTAT    25 tablet    For chest pain place 1 tablet under the tongue every 5 minutes for 3 doses. If symptoms persist 5 minutes after 1st dose call 911.    Atherosclerosis of native coronary artery without angina pectoris, unspecified whether native or transplanted heart       ONE TOUCH ULTRA (DEVICES) Misc     1    test blood sugar BID    Type II or unspecified type diabetes mellitus without mention of complication, not stated as uncontrolled        polyethylene glycol Packet    MIRALAX/GLYCOLAX    7 packet    Take 17 g by mouth daily    Atherosclerosis of native coronary artery without angina pectoris, unspecified whether native or transplanted heart       potassium chloride SA 20 MEQ CR tablet    K-DUR/KLOR-CON M    60 tablet    Take 2 tablets (40 mEq) by mouth daily    Atherosclerosis of native coronary artery without angina pectoris, unspecified whether native or transplanted heart       pregabalin 100 MG capsule    LYRICA    90 capsule    Take 1 capsule (100 mg) by mouth 2 times daily    Pain in joint of left shoulder       repaglinide 1 MG tablet    PRANDIN    90 tablet    Take 1 tablet (1 mg) by mouth 3 times daily (before meals)    Type 2 diabetes mellitus with diabetic chronic kidney disease, unspecified CKD stage, unspecified long term insulin use status (H)       senna-docusate 8.6-50 MG per tablet    SENOKOT-S;PERICOLACE    30 tablet    Take 1-2 tablets by mouth 2 times daily as needed for constipation    Atherosclerosis of native coronary artery without angina pectoris, unspecified whether native or transplanted heart       traZODone 50 MG tablet    DESYREL    270 tablet    Take 3 tablets (150 mg) by mouth At Bedtime    Recurrent major depressive disorder, in partial remission (H)       * Notice:  This list has 2 medication(s) that are the same as other medications prescribed for you. Read the directions carefully, and ask your doctor or other care provider to review them with you.

## 2018-07-30 NOTE — LETTER
7/30/2018       RE: Mariel Ribeiro  [address] [place] [address]     Dear Colleague,    Thank you for referring your patient, Mariel Ribeiro, to the [facility] ENDOCR[facility]OLOGY at [facility]. Please see a copy of my visit note below.    HPI     Post hospital discharge visit:   HPI   Mariel Ribeiro is a 73 year old female with a history of type 2 diabetes,obesity, COPD, CAD with history of stent placement, CKD stage III, hyperlipidemia,fibromyalgia, anxiety, depression, lymphedema, S/P CABG on 07/ 02/18.    Diabetes history   Diagnosed in 2007, historically poorly controlled  Initially on oral agents: Metformin, Januvia, and glimepiride.   NPH added later but remained uncontrolled.   Major barriers include difficult to remember dose, noncompliance  Complications: Cerebrovascular accident, coronary artery disease status post CABG  Microvascular complications including mononeuritis.   No documentation of diabetic retinopathy. Has CKD.     Interval history  She started testing blood sugars 4 times a day.  Range of blood sugar: 76- 274  Mean blood sugar 148  Standard deviation was 51  No low blood sugars, no symptoms of low blood sugars when the blood sugars were below 80  Appetite is still poor no change in bowel movements, complaints of edema in her legs after surgery  Ambulation is difficult.  Diet: Roommate is helping her and she has been cooking for her.  No chest pain or shortness of breath.  Feels deconditioned.    Exam  /70  Pulse 75  General no distress, obese female  Head normocephalic atraumatic  Eyes pupils are reactive  ENT no throat congestion  Thyroid difficult to palpate due to body habitus  No cervical adenopathy  Chest rare rhonchi  CVS S1-S2 no murmurs no tenderness locally  Abdomen normal bowel sounds, nontender  Lower extremities patient has stockings, bilateral edema that is pitting  Neuro normal speech, moving all extremities  No  depression    Lab and imaging data  I reviewed with the previous labs  A1c is historically uncontrolled, additionally recent has remained anemic therefore values may be higher than documented.  She has chronic kidney disease and therefore is off metformin  LDL cholesterol was 111 in March 2018, she is on a statin  Albuminuria was recorded in March 2018  Assessment:    Uncontrolled type 2 diabetes mellitus with microvascular complications  Hypothyroidism on stable dose of levothyroxine 150 mcg daily  Morbid obesity    73-year-old female with uncontrolled diabetes mellitus status post CABG with recent discharge from the hospital about a week ago.    Blood sugars have been well controlled on the current regimen.  The kidney functions are stable, further plan includes reintroduction of metformin.  Normal thyroid function test since November 2017.  Slightly over replaced in early 2017.      PLAN:  -Morning NPH 35 units am ( 0800)  -Evening NPH 35 units at 1800 (with supper)  -repaglinide 1mg TID with meals   -monitor glucose ac, hs  Plan to reduce morning NPH to 33 units if the blood sugars are below 70 during the daytime.  She will call us with low blood sugar readings.  She does not know how to use my chart.  Three-month follow-up with diabetic team  Six-month follow-up with me. Eventually transition to PCP team in 6-12 month.   Keven Jaeger MD  [id number]  Endocrinology Service

## 2018-07-30 NOTE — PATIENT INSTRUCTIONS
Continue NPH 35 units twice a day   Prandin with meals, skip dose if you skip a meal.   If BG < 70 please call us/

## 2018-07-30 NOTE — LETTER
[facility]   [phone]      7/30/2018       Mariel Ribeiro  [address] [place] [address]        Dear Mariel,    It was a pleasure to speak with you.  I would like to provide you with the enclosed emergency contact  information for your records.    As always, feel free to contact me if you have any questions or concerns.  I look forward to working with you in the effort to achieve your health care and wellness goals .        Sincerely,        Senia Durán RN / Clinical Care Coordinator     [facility]   [email or web address]  Office :  [phone] / Fax :  [phone]

## 2018-07-30 NOTE — PROGRESS NOTES
"Clinic Care Coordination Contact  Care Coordination Communication         Clinical Data: Patient was hospitalized at 6/29 from 7/13/2018 with diagnosis of   Clinical Data: Patient was hospitalized at [facility]  from 6/29 to 7/13 with diagnosis of   Hypokalemia Acute Chest pain  7/2 Bypass graft  Rehabilitation 7/9-7/23/2018--RN Care Coordinator left contact information with Rehabilitation but no call when the patient was discharged from TCU     Home Care Contact:              Home Care Agency: [facility]             Contact name () and phone number: Rafaela BERNARD [phone]              Care Coordination contacted home care: Yes              Anticipated start of care date: Admit nurse visit done   Patient Contact:               Introduced self and role of care coordination.               Discharge instructions were reviewed with patient/caregiver.               Do you have any questions about your medications? No              Follow up appointment is scheduled for Past 7/27/2018              Provided 24 Hour Nurse Line and/or 24 Hour Appointment Scheduling: Yes              Home care has contacted patient: Yes              Patient questions/concerns: Patient reports she will have home PT/OT and then will start outpatient Cardiac Rehabilitation  Blood sugars ranging 76 to the lower 200\"s patient checks her BS 4 times a day .      Just saw the Endocrinologist and is doing well and she will follow up with PA in 3 months and Endocrinologist in 6 months      Plan: RN/SW Care Coordinator will await notification from home care staff informing RN/SW Care Coordinator of patients discharge plans/needs. RN/SW Care Coordinator will review chart and outreach to home care every 4 weeks and as needed.      Senia Durán RN / Clinical Care Coordinator     [facility]   [email or web address]  Office : "  [phone] / Fax :  [phone]

## 2018-07-30 NOTE — PROGRESS NOTES
HPI     Post hospital discharge visit:   HPI   Mariel Ribeiro is a 73 year old female with a history of type 2 diabetes,obesity, COPD, CAD with history of stent placement, CKD stage III, hyperlipidemia,fibromyalgia, anxiety, depression, lymphedema, S/P CABG on 07/ 02/18.    Diabetes history   Diagnosed in 2007, historically poorly controlled  Initially on oral agents: Metformin, Januvia, and glimepiride.   NPH added later but remained uncontrolled.   Major barriers include difficult to remember dose, noncompliance  Complications: Cerebrovascular accident, coronary artery disease status post CABG  Microvascular complications including mononeuritis.   No documentation of diabetic retinopathy. Has CKD.     Interval history  She started testing blood sugars 4 times a day.  Range of blood sugar: 76- 274  Mean blood sugar 148  Standard deviation was 51  No low blood sugars, no symptoms of low blood sugars when the blood sugars were below 80  Appetite is still poor no change in bowel movements, complaints of edema in her legs after surgery  Ambulation is difficult.  Diet: Roommate is helping her and she has been cooking for her.  No chest pain or shortness of breath.  Feels deconditioned.    Exam  /70  Pulse 75  General no distress, obese female  Head normocephalic atraumatic  Eyes pupils are reactive  ENT no throat congestion  Thyroid difficult to palpate due to body habitus  No cervical adenopathy  Chest rare rhonchi  CVS S1-S2 no murmurs no tenderness locally  Abdomen normal bowel sounds, nontender  Lower extremities patient has stockings, bilateral edema that is pitting  Neuro normal speech, moving all extremities  No depression    Lab and imaging data  I reviewed with the previous labs  A1c is historically uncontrolled, additionally recent has remained anemic therefore values may be higher than documented.  She has chronic kidney disease and therefore is off metformin  LDL cholesterol was 111 in March 2018,  she is on a statin  Albuminuria was recorded in March 2018  Assessment:    Uncontrolled type 2 diabetes mellitus with microvascular complications  Hypothyroidism on stable dose of levothyroxine 150 mcg daily  Morbid obesity    73-year-old female with uncontrolled diabetes mellitus status post CABG with recent discharge from the hospital about a week ago.    Blood sugars have been well controlled on the current regimen.  The kidney functions are stable, further plan includes reintroduction of metformin.  Normal thyroid function test since November 2017.  Slightly over replaced in early 2017.      PLAN:  -Morning NPH 35 units am ( 0800)  -Evening NPH 35 units at 1800 (with supper)  -repaglinide 1mg TID with meals   -monitor glucose ac, hs  Plan to reduce morning NPH to 33 units if the blood sugars are below 70 during the daytime.  She will call us with low blood sugar readings.  She does not know how to use my chart.  Three-month follow-up with diabetic team  Six-month follow-up with me. Eventually transition to PCP team in 6-12 month.   Keven Jaeger MD  [id number]  Endocrinology Service

## 2018-07-31 ENCOUNTER — DOCUMENTATION ONLY (OUTPATIENT)
Dept: CARE COORDINATION | Facility: CLINIC | Age: 73
End: 2018-07-31

## 2018-07-31 NOTE — PROGRESS NOTES
[facility] now requests orders and shares plan of care/discharge summaries for some patients through [employer].  Please REPLY TO THIS MESSAGE OR ROUTE BACK TO THE AUTHOR in order to give authorization for orders when needed.  This is considered a verbal order, you will still receive a faxed copy of orders for signature.  Thank you for your assistance in improving collaboration for our patients.    ORDER PT 1w1, 2w2, 1w1 s/p CABG, OT lymphedema evaluation, and ST evaluation for swallowing issues.    Rossi Lundberg, PT  [email or web address]  [phone]

## 2018-08-01 ENCOUNTER — DOCUMENTATION ONLY (OUTPATIENT)
Dept: CARE COORDINATION | Facility: CLINIC | Age: 73
End: 2018-08-01

## 2018-08-01 NOTE — PROGRESS NOTES
[facility] now requests orders and shares plan of care/discharge summaries for some patients through [employer].  Please REPLY TO THIS MESSAGE OR ROUTE BACK TO THE AUTHOR in order to give authorization for orders when needed.  This is considered a verbal order, you will still receive a faxed copy of orders for signature.  Thank you for your assistance in improving collaboration for our patients.    ORDER    OT 1w1, 2w2 for HEP, ADL/IADL safety, cog strategies, incontinence training.  The plan will also include falls prevention plan, monitor and treat pain, monitor skin integrity, continence management, monitor for s/s of depression, diabetic foot care, monitor for symptoms of heart failure and to achieve the following goals.   In 3 weeks,  1. Pt will demonstrate understanding of continence home program for decreased incontinence, urgencies, product use and overall safety with toileting.    2. Pt will demo indep with B UE HEP following written handout for inc activity tolerance for inc indep and safety with bathing and cooking.  3. pt will verbalize and demo understanding re cognitive strategies to improve memory and concentration for inc indep and safety with cooking.  4. pt will demo understanding re use of adaptive techniques and AE for inc indep and safety with dressing and bathing.  5. pt will demo mod indep and good safety with simple meal prep for inc indep and safety with cooking.    Elza Willoughby, OTR/L  [phone]

## 2018-08-06 ENCOUNTER — DOCUMENTATION ONLY (OUTPATIENT)
Dept: CARE COORDINATION | Facility: CLINIC | Age: 73
End: 2018-08-06

## 2018-08-06 NOTE — PROGRESS NOTES
[facility] now requests orders and shares plan of care/discharge summaries for some patients through [employer].  Please REPLY TO THIS MESSAGE OR ROUTE BACK TO THE AUTHOR in order to give authorization for orders when needed.  This is considered a verbal order, you will still receive a faxed copy of orders for signature.  Thank you for your assistance in improving collaboration for our patients.    ORDER  Requesting ok to see pt for OT lymphedema therapy treatment for BLE edema reduction 3x week for 4 weeks includes gradient compression bandaging, exercises to facilitate the lymphatic system, manual lymph drainage and patient education and fit for compression garments.     Rosina Angulo, JOE/DOMINIC, CLT  [phone]  [email or web address]

## 2018-08-08 ENCOUNTER — DOCUMENTATION ONLY (OUTPATIENT)
Dept: CARE COORDINATION | Facility: CLINIC | Age: 73
End: 2018-08-08

## 2018-08-08 NOTE — PROGRESS NOTES
OT lymphedema therapist reporting to MD regarding recent fall at 4am on 8/7/18, pt was trying to get up from office chair to get to the bathroom, Legs were asleep and she was unable to feel them when she stood up then she fell. She hit her head and landed on her right side.  Roommate was awoken to call fire dept to assist in getting her up. Pt reports no dizziness/headaches however states her right shoulder feels more sore. OT recommended to take her time getting up from chair and making sure she has her balance before ambulating. Pt states has medical alert device however she says it has not been working lately. OT recommended to get it fixed or going with alternative vendor such as [employer].    Rosina Angulo, JOE/DOMINIC, CLT  [phone]  [email or web address]

## 2018-08-09 ENCOUNTER — TELEPHONE (OUTPATIENT)
Dept: FAMILY MEDICINE | Facility: CLINIC | Age: 73
End: 2018-08-09

## 2018-08-09 ENCOUNTER — DOCUMENTATION ONLY (OUTPATIENT)
Dept: CARE COORDINATION | Facility: CLINIC | Age: 73
End: 2018-08-09

## 2018-08-09 DIAGNOSIS — L30.4 INTERTRIGO: Primary | ICD-10-CM

## 2018-08-09 NOTE — PROGRESS NOTES
[facility] requests orders via Epic. Please REPLY TO THIS MESSAGE with authorization for orders. This is considered a verbal order, you will still receive a faxed copy of orders for signature.    ORDER REQUESTED: Speech Therapy 1w3    ST SUMMARY/PLAN OF CARE: Dysphagia eval completed 8/8/18 with overt s/s aspiration observed. Recommend ongoing in home dysphagia tx to address and further assess need for Video Swallow Study.    Thank you,    Lilia Arriaga MS (Kate), CCC-SLP  Speech Language Pathologist, [facility]  Phone: [phone]   Email: [email or web address]  Days Worked: Tuesday, Wednesday, Friday

## 2018-08-09 NOTE — TELEPHONE ENCOUNTER
Home care called to report Mariel has a rash under folds of abdomen.  She is requesting Nystatin powder be sent to [facility]    Please call Rafaela (home care) when this has been sent. Ok to leave a detailed message.

## 2018-08-09 NOTE — TELEPHONE ENCOUNTER
LOV: 7/27/2018    Patient home care nurse requesting nystatin powder for rash under folds of abdomen.     Please advise. Difficulty with coming into office visit--no Evisits completed although patient has active MyChart.

## 2018-08-10 DIAGNOSIS — Z53.9 DIAGNOSIS NOT YET DEFINED: Primary | ICD-10-CM

## 2018-08-10 PROCEDURE — G0180 MD CERTIFICATION HHA PATIENT: HCPCS | Performed by: FAMILY MEDICINE

## 2018-08-10 RX ORDER — NYSTATIN 100000 [USP'U]/G
POWDER TOPICAL 3 TIMES DAILY PRN
Qty: 60 G | Refills: 1 | Status: SHIPPED | OUTPATIENT
Start: 2018-08-10 | End: 2018-08-15

## 2018-08-13 DIAGNOSIS — E11.42 TYPE 2 DIABETES MELLITUS WITH DIABETIC POLYNEUROPATHY, WITHOUT LONG-TERM CURRENT USE OF INSULIN (H): ICD-10-CM

## 2018-08-13 NOTE — TELEPHONE ENCOUNTER
"Requested Prescriptions   Pending Prescriptions Disp Refills     blood glucose monitoring (NO BRAND SPECIFIED) test strip  Last Written Prescription Date:  3-19-18  Last Fill Quantity: 200 ea,  # refills: 3   Last office visit: 2018 with prescribing provider:  Rafaela William    Future Office Visit:   200 each 3     Si strip by In Vitro route 2 times daily Strips per patient's preference    Diabetic Supplies Protocol Passed    2018  3:13 PM       Passed - Patient is 18 years of age or older       Passed - Recent (6 mo) or future (30 days) visit within the authorizing provider's specialty    Patient had office visit in the last 6 months or has a visit in the next 30 days with authorizing provider.  See \"Patient Info\" tab in inbasket, or \"Choose Columns\" in Meds & Orders section of the refill encounter.              "

## 2018-08-15 ENCOUNTER — HOSPITAL ENCOUNTER (INPATIENT)
Facility: CLINIC | Age: 73
LOS: 1 days | Discharge: HOME HEALTH | DRG: 287 | End: 2018-08-16
Attending: INTERNAL MEDICINE | Admitting: INTERNAL MEDICINE
Payer: MEDICARE

## 2018-08-15 ENCOUNTER — OFFICE VISIT (OUTPATIENT)
Dept: CARDIOLOGY | Facility: CLINIC | Age: 73
DRG: 287 | End: 2018-08-15
Attending: INTERNAL MEDICINE
Payer: MEDICARE

## 2018-08-15 ENCOUNTER — OFFICE VISIT (OUTPATIENT)
Dept: PHYSICAL MEDICINE AND REHAB | Facility: CLINIC | Age: 73
End: 2018-08-15
Payer: MEDICARE

## 2018-08-15 ENCOUNTER — DOCUMENTATION ONLY (OUTPATIENT)
Dept: CARE COORDINATION | Facility: CLINIC | Age: 73
End: 2018-08-15

## 2018-08-15 ENCOUNTER — APPOINTMENT (OUTPATIENT)
Dept: GENERAL RADIOLOGY | Facility: CLINIC | Age: 73
DRG: 287 | End: 2018-08-15
Attending: INTERNAL MEDICINE
Payer: MEDICARE

## 2018-08-15 VITALS
SYSTOLIC BLOOD PRESSURE: 98 MMHG | WEIGHT: 293 LBS | BODY MASS INDEX: 47.09 KG/M2 | HEIGHT: 66 IN | HEART RATE: 63 BPM | OXYGEN SATURATION: 94 % | DIASTOLIC BLOOD PRESSURE: 63 MMHG

## 2018-08-15 VITALS
SYSTOLIC BLOOD PRESSURE: 98 MMHG | HEIGHT: 66 IN | DIASTOLIC BLOOD PRESSURE: 63 MMHG | OXYGEN SATURATION: 94 % | BODY MASS INDEX: 47.09 KG/M2 | WEIGHT: 293 LBS | HEART RATE: 63 BPM

## 2018-08-15 DIAGNOSIS — I25.10 ATHEROSCLEROSIS OF NATIVE CORONARY ARTERY WITHOUT ANGINA PECTORIS, UNSPECIFIED WHETHER NATIVE OR TRANSPLANTED HEART: ICD-10-CM

## 2018-08-15 DIAGNOSIS — L30.9 DERMATITIS: ICD-10-CM

## 2018-08-15 DIAGNOSIS — I50.32 CHRONIC DIASTOLIC HEART FAILURE (H): Primary | ICD-10-CM

## 2018-08-15 DIAGNOSIS — M25.512 CHRONIC LEFT SHOULDER PAIN: ICD-10-CM

## 2018-08-15 DIAGNOSIS — R13.10 DYSPHAGIA, UNSPECIFIED TYPE: ICD-10-CM

## 2018-08-15 DIAGNOSIS — M19.012 OSTEOARTHRITIS OF LEFT GLENOHUMERAL JOINT: Primary | ICD-10-CM

## 2018-08-15 DIAGNOSIS — T17.908A ASPIRATION INTO AIRWAY, INITIAL ENCOUNTER: Primary | ICD-10-CM

## 2018-08-15 DIAGNOSIS — G89.29 CHRONIC LEFT SHOULDER PAIN: ICD-10-CM

## 2018-08-15 DIAGNOSIS — I50.30 (HFPEF) HEART FAILURE WITH PRESERVED EJECTION FRACTION (H): Primary | ICD-10-CM

## 2018-08-15 DIAGNOSIS — D50.8 IRON DEFICIENCY ANEMIA SECONDARY TO INADEQUATE DIETARY IRON INTAKE: ICD-10-CM

## 2018-08-15 DIAGNOSIS — I50.32 CHRONIC DIASTOLIC HEART FAILURE (H): ICD-10-CM

## 2018-08-15 PROBLEM — R55 SYNCOPE: Status: ACTIVE | Noted: 2018-08-15

## 2018-08-15 LAB
ANION GAP SERPL CALCULATED.3IONS-SCNC: 9 MMOL/L (ref 3–14)
BUN SERPL-MCNC: 31 MG/DL (ref 7–30)
CALCIUM SERPL-MCNC: 9.1 MG/DL (ref 8.5–10.1)
CHLORIDE SERPL-SCNC: 107 MMOL/L (ref 94–109)
CO2 SERPL-SCNC: 28 MMOL/L (ref 20–32)
CREAT SERPL-MCNC: 1.41 MG/DL (ref 0.52–1.04)
ERYTHROCYTE [DISTWIDTH] IN BLOOD BY AUTOMATED COUNT: 14.3 % (ref 10–15)
FERRITIN SERPL-MCNC: 22 NG/ML (ref 8–252)
GFR SERPL CREATININE-BSD FRML MDRD: 37 ML/MIN/1.7M2
GLUCOSE BLDC GLUCOMTR-MCNC: 155 MG/DL (ref 70–99)
GLUCOSE BLDC GLUCOMTR-MCNC: 207 MG/DL (ref 70–99)
GLUCOSE BLDC GLUCOMTR-MCNC: 97 MG/DL (ref 70–99)
GLUCOSE SERPL-MCNC: 79 MG/DL (ref 70–99)
HCT VFR BLD AUTO: 33.2 % (ref 35–47)
HGB BLD-MCNC: 9.7 G/DL (ref 11.7–15.7)
IRON SATN MFR SERPL: 4 % (ref 15–46)
IRON SERPL-MCNC: 19 UG/DL (ref 35–180)
LDH SERPL L TO P-CCNC: 236 U/L (ref 81–234)
MCH RBC QN AUTO: 25 PG (ref 26.5–33)
MCHC RBC AUTO-ENTMCNC: 29.2 G/DL (ref 31.5–36.5)
MCV RBC AUTO: 86 FL (ref 78–100)
NT-PROBNP SERPL-MCNC: 1149 PG/ML (ref 0–900)
PLATELET # BLD AUTO: 124 10E9/L (ref 150–450)
POTASSIUM SERPL-SCNC: 3.4 MMOL/L (ref 3.4–5.3)
RBC # BLD AUTO: 3.88 10E12/L (ref 3.8–5.2)
RETICS # AUTO: 73.7 10E9/L (ref 25–95)
RETICS/RBC NFR AUTO: 1.9 % (ref 0.5–2)
SODIUM SERPL-SCNC: 144 MMOL/L (ref 133–144)
TIBC SERPL-MCNC: 433 UG/DL (ref 240–430)
TRANSFERRIN SERPL-MCNC: 275 MG/DL (ref 210–360)
WBC # BLD AUTO: 4.8 10E9/L (ref 4–11)

## 2018-08-15 PROCEDURE — 25000131 ZZH RX MED GY IP 250 OP 636 PS 637: Mod: GY | Performed by: INTERNAL MEDICINE

## 2018-08-15 PROCEDURE — 84466 ASSAY OF TRANSFERRIN: CPT | Performed by: STUDENT IN AN ORGANIZED HEALTH CARE EDUCATION/TRAINING PROGRAM

## 2018-08-15 PROCEDURE — 25000131 ZZH RX MED GY IP 250 OP 636 PS 637: Mod: GY | Performed by: STUDENT IN AN ORGANIZED HEALTH CARE EDUCATION/TRAINING PROGRAM

## 2018-08-15 PROCEDURE — 85027 COMPLETE CBC AUTOMATED: CPT | Performed by: STUDENT IN AN ORGANIZED HEALTH CARE EDUCATION/TRAINING PROGRAM

## 2018-08-15 PROCEDURE — 93306 TTE W/DOPPLER COMPLETE: CPT

## 2018-08-15 PROCEDURE — 00000146 ZZHCL STATISTIC GLUCOSE BY METER IP

## 2018-08-15 PROCEDURE — A9270 NON-COVERED ITEM OR SERVICE: HCPCS | Mod: GY | Performed by: INTERNAL MEDICINE

## 2018-08-15 PROCEDURE — 71045 X-RAY EXAM CHEST 1 VIEW: CPT

## 2018-08-15 PROCEDURE — 85045 AUTOMATED RETICULOCYTE COUNT: CPT | Performed by: STUDENT IN AN ORGANIZED HEALTH CARE EDUCATION/TRAINING PROGRAM

## 2018-08-15 PROCEDURE — 4A023N6 MEASUREMENT OF CARDIAC SAMPLING AND PRESSURE, RIGHT HEART, PERCUTANEOUS APPROACH: ICD-10-PCS | Performed by: INTERNAL MEDICINE

## 2018-08-15 PROCEDURE — 40000556 ZZH STATISTIC PERIPHERAL IV START W US GUIDANCE

## 2018-08-15 PROCEDURE — 21400006 ZZH R&B CCU INTERMEDIATE UMMC

## 2018-08-15 PROCEDURE — 93005 ELECTROCARDIOGRAM TRACING: CPT

## 2018-08-15 PROCEDURE — 36415 COLL VENOUS BLD VENIPUNCTURE: CPT | Performed by: STUDENT IN AN ORGANIZED HEALTH CARE EDUCATION/TRAINING PROGRAM

## 2018-08-15 PROCEDURE — 82728 ASSAY OF FERRITIN: CPT | Performed by: STUDENT IN AN ORGANIZED HEALTH CARE EDUCATION/TRAINING PROGRAM

## 2018-08-15 PROCEDURE — 83615 LACTATE (LD) (LDH) ENZYME: CPT | Performed by: STUDENT IN AN ORGANIZED HEALTH CARE EDUCATION/TRAINING PROGRAM

## 2018-08-15 PROCEDURE — 83540 ASSAY OF IRON: CPT | Performed by: STUDENT IN AN ORGANIZED HEALTH CARE EDUCATION/TRAINING PROGRAM

## 2018-08-15 PROCEDURE — A9270 NON-COVERED ITEM OR SERVICE: HCPCS | Mod: GY | Performed by: DENTIST

## 2018-08-15 PROCEDURE — 25000132 ZZH RX MED GY IP 250 OP 250 PS 637: Mod: GY | Performed by: INTERNAL MEDICINE

## 2018-08-15 PROCEDURE — 93010 ELECTROCARDIOGRAM REPORT: CPT | Performed by: INTERNAL MEDICINE

## 2018-08-15 PROCEDURE — 93306 TTE W/DOPPLER COMPLETE: CPT | Mod: 26 | Performed by: INTERNAL MEDICINE

## 2018-08-15 PROCEDURE — A9270 NON-COVERED ITEM OR SERVICE: HCPCS | Mod: GY | Performed by: STUDENT IN AN ORGANIZED HEALTH CARE EDUCATION/TRAINING PROGRAM

## 2018-08-15 PROCEDURE — 83550 IRON BINDING TEST: CPT | Performed by: STUDENT IN AN ORGANIZED HEALTH CARE EDUCATION/TRAINING PROGRAM

## 2018-08-15 PROCEDURE — 25000132 ZZH RX MED GY IP 250 OP 250 PS 637: Mod: GY | Performed by: DENTIST

## 2018-08-15 PROCEDURE — 83880 ASSAY OF NATRIURETIC PEPTIDE: CPT | Performed by: STUDENT IN AN ORGANIZED HEALTH CARE EDUCATION/TRAINING PROGRAM

## 2018-08-15 PROCEDURE — 25000132 ZZH RX MED GY IP 250 OP 250 PS 637: Mod: GY | Performed by: STUDENT IN AN ORGANIZED HEALTH CARE EDUCATION/TRAINING PROGRAM

## 2018-08-15 PROCEDURE — G0463 HOSPITAL OUTPT CLINIC VISIT: HCPCS | Mod: ZF

## 2018-08-15 PROCEDURE — 80048 BASIC METABOLIC PNL TOTAL CA: CPT | Performed by: STUDENT IN AN ORGANIZED HEALTH CARE EDUCATION/TRAINING PROGRAM

## 2018-08-15 PROCEDURE — 25000128 H RX IP 250 OP 636: Performed by: DENTIST

## 2018-08-15 PROCEDURE — 25000128 H RX IP 250 OP 636: Performed by: STUDENT IN AN ORGANIZED HEALTH CARE EDUCATION/TRAINING PROGRAM

## 2018-08-15 PROCEDURE — 99214 OFFICE O/P EST MOD 30 MIN: CPT | Mod: 25 | Performed by: INTERNAL MEDICINE

## 2018-08-15 RX ORDER — POLYETHYLENE GLYCOL 3350 17 G/17G
17 POWDER, FOR SOLUTION ORAL DAILY
Status: DISCONTINUED | OUTPATIENT
Start: 2018-08-15 | End: 2018-08-16 | Stop reason: HOSPADM

## 2018-08-15 RX ORDER — ESCITALOPRAM OXALATE 10 MG/1
20 TABLET ORAL EVERY MORNING
Status: DISCONTINUED | OUTPATIENT
Start: 2018-08-16 | End: 2018-08-16 | Stop reason: HOSPADM

## 2018-08-15 RX ORDER — FUROSEMIDE 80 MG
80 TABLET ORAL ONCE
Status: COMPLETED | OUTPATIENT
Start: 2018-08-15 | End: 2018-08-15

## 2018-08-15 RX ORDER — PREGABALIN 100 MG/1
100 CAPSULE ORAL 2 TIMES DAILY
Status: DISCONTINUED | OUTPATIENT
Start: 2018-08-15 | End: 2018-08-16 | Stop reason: HOSPADM

## 2018-08-15 RX ORDER — FAMOTIDINE 20 MG/1
20 TABLET, FILM COATED ORAL 2 TIMES DAILY PRN
Status: DISCONTINUED | OUTPATIENT
Start: 2018-08-15 | End: 2018-08-16 | Stop reason: HOSPADM

## 2018-08-15 RX ORDER — LEVOTHYROXINE SODIUM 150 UG/1
150 TABLET ORAL DAILY
Status: DISCONTINUED | OUTPATIENT
Start: 2018-08-15 | End: 2018-08-16 | Stop reason: HOSPADM

## 2018-08-15 RX ORDER — LIDOCAINE 40 MG/G
CREAM TOPICAL
Status: DISCONTINUED | OUTPATIENT
Start: 2018-08-15 | End: 2018-08-16 | Stop reason: HOSPADM

## 2018-08-15 RX ORDER — MAGNESIUM SULFATE HEPTAHYDRATE 40 MG/ML
4 INJECTION, SOLUTION INTRAVENOUS EVERY 4 HOURS PRN
Status: DISCONTINUED | OUTPATIENT
Start: 2018-08-15 | End: 2018-08-16 | Stop reason: HOSPADM

## 2018-08-15 RX ORDER — POTASSIUM CL/LIDO/0.9 % NACL 10MEQ/0.1L
10 INTRAVENOUS SOLUTION, PIGGYBACK (ML) INTRAVENOUS
Status: DISCONTINUED | OUTPATIENT
Start: 2018-08-15 | End: 2018-08-15

## 2018-08-15 RX ORDER — ACETAMINOPHEN 325 MG/1
650 TABLET ORAL EVERY 4 HOURS PRN
Status: DISCONTINUED | OUTPATIENT
Start: 2018-08-15 | End: 2018-08-16 | Stop reason: HOSPADM

## 2018-08-15 RX ORDER — HYDROCODONE BITARTRATE AND ACETAMINOPHEN 5; 325 MG/1; MG/1
1-2 TABLET ORAL EVERY 4 HOURS PRN
Status: DISCONTINUED | OUTPATIENT
Start: 2018-08-15 | End: 2018-08-16 | Stop reason: HOSPADM

## 2018-08-15 RX ORDER — HYDRALAZINE HYDROCHLORIDE 25 MG/1
75 TABLET, FILM COATED ORAL 3 TIMES DAILY
Qty: 270 TABLET | Refills: 3 | Status: ON HOLD | COMMUNITY
Start: 2018-08-15 | End: 2018-08-16

## 2018-08-15 RX ORDER — AMOXICILLIN 250 MG
1-2 CAPSULE ORAL 2 TIMES DAILY PRN
Status: DISCONTINUED | OUTPATIENT
Start: 2018-08-15 | End: 2018-08-16 | Stop reason: HOSPADM

## 2018-08-15 RX ORDER — NICOTINE POLACRILEX 4 MG
15-30 LOZENGE BUCCAL
Status: DISCONTINUED | OUTPATIENT
Start: 2018-08-15 | End: 2018-08-16 | Stop reason: HOSPADM

## 2018-08-15 RX ORDER — GUAIFENESIN 600 MG/1
600 TABLET, EXTENDED RELEASE ORAL 2 TIMES DAILY
Status: DISCONTINUED | OUTPATIENT
Start: 2018-08-15 | End: 2018-08-16 | Stop reason: HOSPADM

## 2018-08-15 RX ORDER — CLOTRIMAZOLE 1 %
CREAM (GRAM) TOPICAL 2 TIMES DAILY
Status: DISCONTINUED | OUTPATIENT
Start: 2018-08-15 | End: 2018-08-16 | Stop reason: HOSPADM

## 2018-08-15 RX ORDER — NALOXONE HYDROCHLORIDE 0.4 MG/ML
.1-.4 INJECTION, SOLUTION INTRAMUSCULAR; INTRAVENOUS; SUBCUTANEOUS
Status: DISCONTINUED | OUTPATIENT
Start: 2018-08-15 | End: 2018-08-16 | Stop reason: HOSPADM

## 2018-08-15 RX ORDER — POTASSIUM CHLORIDE 29.8 MG/ML
20 INJECTION INTRAVENOUS
Status: DISCONTINUED | OUTPATIENT
Start: 2018-08-15 | End: 2018-08-16 | Stop reason: HOSPADM

## 2018-08-15 RX ORDER — ALUMINA, MAGNESIA, AND SIMETHICONE 2400; 2400; 240 MG/30ML; MG/30ML; MG/30ML
30 SUSPENSION ORAL EVERY 4 HOURS PRN
Status: DISCONTINUED | OUTPATIENT
Start: 2018-08-15 | End: 2018-08-16 | Stop reason: HOSPADM

## 2018-08-15 RX ORDER — LIDOCAINE HYDROCHLORIDE 10 MG/ML
4 INJECTION, SOLUTION INFILTRATION; PERINEURAL ONCE
Status: DISCONTINUED | OUTPATIENT
Start: 2018-08-15 | End: 2018-08-15

## 2018-08-15 RX ORDER — LANOLIN ALCOHOL/MO/W.PET/CERES
500 CREAM (GRAM) TOPICAL 2 TIMES DAILY
Status: DISCONTINUED | OUTPATIENT
Start: 2018-08-15 | End: 2018-08-15

## 2018-08-15 RX ORDER — ATORVASTATIN CALCIUM 40 MG/1
40 TABLET, FILM COATED ORAL DAILY
Status: DISCONTINUED | OUTPATIENT
Start: 2018-08-15 | End: 2018-08-16 | Stop reason: HOSPADM

## 2018-08-15 RX ORDER — TIZANIDINE 2 MG/1
4 TABLET ORAL AT BEDTIME
Status: DISCONTINUED | OUTPATIENT
Start: 2018-08-15 | End: 2018-08-16 | Stop reason: HOSPADM

## 2018-08-15 RX ORDER — NICOTINE POLACRILEX 4 MG
15-30 LOZENGE BUCCAL
Status: DISCONTINUED | OUTPATIENT
Start: 2018-08-15 | End: 2018-08-15

## 2018-08-15 RX ORDER — POTASSIUM CHLORIDE 750 MG/1
20-40 TABLET, EXTENDED RELEASE ORAL
Status: DISCONTINUED | OUTPATIENT
Start: 2018-08-15 | End: 2018-08-16 | Stop reason: HOSPADM

## 2018-08-15 RX ORDER — ONDANSETRON 4 MG/1
4 TABLET, ORALLY DISINTEGRATING ORAL EVERY 6 HOURS PRN
Status: DISCONTINUED | OUTPATIENT
Start: 2018-08-15 | End: 2018-08-16 | Stop reason: HOSPADM

## 2018-08-15 RX ORDER — DEXTROSE MONOHYDRATE 25 G/50ML
25-50 INJECTION, SOLUTION INTRAVENOUS
Status: DISCONTINUED | OUTPATIENT
Start: 2018-08-15 | End: 2018-08-16 | Stop reason: HOSPADM

## 2018-08-15 RX ORDER — PROCHLORPERAZINE 25 MG
12.5 SUPPOSITORY, RECTAL RECTAL EVERY 12 HOURS PRN
Status: DISCONTINUED | OUTPATIENT
Start: 2018-08-15 | End: 2018-08-16 | Stop reason: HOSPADM

## 2018-08-15 RX ORDER — NITROGLYCERIN 0.4 MG/1
0.4 TABLET SUBLINGUAL EVERY 5 MIN PRN
Status: DISCONTINUED | OUTPATIENT
Start: 2018-08-15 | End: 2018-08-16 | Stop reason: HOSPADM

## 2018-08-15 RX ORDER — LIDOCAINE HYDROCHLORIDE 10 MG/ML
4 INJECTION, SOLUTION INFILTRATION; PERINEURAL ONCE
Qty: 4 ML | Refills: 0 | Status: ON HOLD | OUTPATIENT
Start: 2018-08-15 | End: 2018-08-16

## 2018-08-15 RX ORDER — METOPROLOL TARTRATE 25 MG/1
75 TABLET, FILM COATED ORAL 2 TIMES DAILY
Status: DISCONTINUED | OUTPATIENT
Start: 2018-08-15 | End: 2018-08-16 | Stop reason: HOSPADM

## 2018-08-15 RX ORDER — DEXTROSE MONOHYDRATE 25 G/50ML
25-50 INJECTION, SOLUTION INTRAVENOUS
Status: DISCONTINUED | OUTPATIENT
Start: 2018-08-15 | End: 2018-08-15

## 2018-08-15 RX ORDER — BISACODYL 10 MG
10 SUPPOSITORY, RECTAL RECTAL DAILY PRN
Status: DISCONTINUED | OUTPATIENT
Start: 2018-08-15 | End: 2018-08-16 | Stop reason: HOSPADM

## 2018-08-15 RX ORDER — METHYLPREDNISOLONE ACETATE 40 MG/ML
40 INJECTION, SUSPENSION INTRA-ARTICULAR; INTRALESIONAL; INTRAMUSCULAR; SOFT TISSUE ONCE
Qty: 1 ML | Refills: 0 | Status: ON HOLD | OUTPATIENT
Start: 2018-08-15 | End: 2018-08-16

## 2018-08-15 RX ORDER — POTASSIUM CHLORIDE 1.5 G/1.58G
20-40 POWDER, FOR SOLUTION ORAL
Status: DISCONTINUED | OUTPATIENT
Start: 2018-08-15 | End: 2018-08-16 | Stop reason: HOSPADM

## 2018-08-15 RX ORDER — FEBUXOSTAT 40 MG/1
40 TABLET, FILM COATED ORAL EVERY EVENING
Status: DISCONTINUED | OUTPATIENT
Start: 2018-08-15 | End: 2018-08-16 | Stop reason: HOSPADM

## 2018-08-15 RX ORDER — LANOLIN ALCOHOL/MO/W.PET/CERES
800 CREAM (GRAM) TOPICAL DAILY
Status: DISCONTINUED | OUTPATIENT
Start: 2018-08-15 | End: 2018-08-16 | Stop reason: HOSPADM

## 2018-08-15 RX ORDER — ALBUTEROL SULFATE 90 UG/1
1-2 AEROSOL, METERED RESPIRATORY (INHALATION) EVERY 4 HOURS PRN
Status: DISCONTINUED | OUTPATIENT
Start: 2018-08-15 | End: 2018-08-16 | Stop reason: HOSPADM

## 2018-08-15 RX ORDER — ONDANSETRON 2 MG/ML
4 INJECTION INTRAMUSCULAR; INTRAVENOUS EVERY 6 HOURS PRN
Status: DISCONTINUED | OUTPATIENT
Start: 2018-08-15 | End: 2018-08-16 | Stop reason: HOSPADM

## 2018-08-15 RX ORDER — PROCHLORPERAZINE MALEATE 5 MG
5 TABLET ORAL EVERY 6 HOURS PRN
Status: DISCONTINUED | OUTPATIENT
Start: 2018-08-15 | End: 2018-08-16 | Stop reason: HOSPADM

## 2018-08-15 RX ORDER — POTASSIUM CHLORIDE 7.45 MG/ML
10 INJECTION INTRAVENOUS
Status: DISCONTINUED | OUTPATIENT
Start: 2018-08-15 | End: 2018-08-16 | Stop reason: HOSPADM

## 2018-08-15 RX ORDER — ACETAMINOPHEN 650 MG/1
650 SUPPOSITORY RECTAL EVERY 4 HOURS PRN
Status: DISCONTINUED | OUTPATIENT
Start: 2018-08-15 | End: 2018-08-16 | Stop reason: HOSPADM

## 2018-08-15 RX ADMIN — CHOLECALCIFEROL CAP 125 MCG (5000 UNIT) 10000 UNITS: 125 CAP at 13:32

## 2018-08-15 RX ADMIN — GUAIFENESIN 600 MG: 600 TABLET, EXTENDED RELEASE ORAL at 20:32

## 2018-08-15 RX ADMIN — ENOXAPARIN SODIUM 50 MG: 60 INJECTION SUBCUTANEOUS at 17:27

## 2018-08-15 RX ADMIN — ATORVASTATIN CALCIUM 40 MG: 40 TABLET, FILM COATED ORAL at 13:32

## 2018-08-15 RX ADMIN — ACETAMINOPHEN 800 MCG: 400 TABLET ORAL at 14:19

## 2018-08-15 RX ADMIN — ACETAMINOPHEN 650 MG: 325 TABLET, FILM COATED ORAL at 13:32

## 2018-08-15 RX ADMIN — Medication 30 G: at 21:50

## 2018-08-15 RX ADMIN — PREGABALIN 100 MG: 100 CAPSULE ORAL at 20:32

## 2018-08-15 RX ADMIN — FEBUXOSTAT 40 MG: 40 TABLET ORAL at 20:32

## 2018-08-15 RX ADMIN — FUROSEMIDE 80 MG: 40 TABLET ORAL at 18:47

## 2018-08-15 RX ADMIN — LEVOTHYROXINE SODIUM 150 MCG: 150 TABLET ORAL at 13:32

## 2018-08-15 RX ADMIN — INSULIN ASPART 3 UNITS: 100 INJECTION, SOLUTION INTRAVENOUS; SUBCUTANEOUS at 18:47

## 2018-08-15 RX ADMIN — TRAZODONE HYDROCHLORIDE 150 MG: 100 TABLET, FILM COATED ORAL at 21:50

## 2018-08-15 RX ADMIN — POTASSIUM CHLORIDE 20 MEQ: 750 TABLET, EXTENDED RELEASE ORAL at 17:27

## 2018-08-15 RX ADMIN — ASPIRIN 325 MG: 325 TABLET, DELAYED RELEASE ORAL at 13:32

## 2018-08-15 RX ADMIN — CHLOROTHIAZIDE 500 MG: 250 TABLET ORAL at 17:32

## 2018-08-15 RX ADMIN — Medication 500 MG: at 20:32

## 2018-08-15 RX ADMIN — IRON SUCROSE 200 MG: 20 INJECTION, SOLUTION INTRAVENOUS at 21:48

## 2018-08-15 RX ADMIN — INSULIN HUMAN 35 UNITS: 100 INJECTION, SUSPENSION SUBCUTANEOUS at 18:47

## 2018-08-15 RX ADMIN — MICONAZOLE NITRATE: 2 POWDER TOPICAL at 20:32

## 2018-08-15 RX ADMIN — METOPROLOL TARTRATE 75 MG: 25 TABLET, FILM COATED ORAL at 20:32

## 2018-08-15 RX ADMIN — TIZANIDINE 4 MG: 2 TABLET ORAL at 21:50

## 2018-08-15 ASSESSMENT — PAIN DESCRIPTION - DESCRIPTORS
DESCRIPTORS: ACHING

## 2018-08-15 ASSESSMENT — PAIN SCALES - GENERAL
PAINLEVEL: NO PAIN (0)
PAINLEVEL: NO PAIN (0)

## 2018-08-15 ASSESSMENT — ACTIVITIES OF DAILY LIVING (ADL)
ADLS_ACUITY_SCORE: 12
ADLS_ACUITY_SCORE: 16

## 2018-08-15 NOTE — NURSING NOTE
Vitals completed successfully and medication reconciled. Divina Luciano MA  Chief Complaint   Patient presents with     Follow Up For      HFpEF

## 2018-08-15 NOTE — IP AVS SNAPSHOT
[address] [address]    Phone:  [phone]                                       After Visit Summary   8/15/2018    Mariel Ribeiro    MRN: [id number]           After Visit Summary Signature Page     I have received my discharge instructions, and my questions have been answered. I have discussed any challenges I see with this plan with the nurse or doctor.    ..........................................................................................................................................  Patient/Patient Representative Signature      ..........................................................................................................................................  Patient Representative Print Name and Relationship to Patient    ..................................................               ................................................  Date                                            Time    ..........................................................................................................................................  Reviewed by Signature/Title    ...................................................              ..............................................  Date                                                            Time
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
## 2018-08-15 NOTE — PLAN OF CARE
Transfer  Transferred from:   Via:bed  Reason for transfer: Pt inappropriate for unit  Family: Aware of transfer  Belongings:Sent with pt  Chart:Sent with pt  Medications: Meds from bin sent with pt  Report called from: JODY Gallagher

## 2018-08-15 NOTE — PROGRESS NOTES
Thank you, please review my orders and let me know if any problems.   She is allergic to contrast dye, please review.

## 2018-08-15 NOTE — PROGRESS NOTES
Mariel continues to demonstrate dysphagia with signs/symptoms of aspiration/penetration with PO intake. I have evaluated her twice in her home but I feel a Video Swallow Study would more clearly assess her risk for aspiration as well as determineand determine if changes to her current compensatory strategies and exercise program are appropriate. She has also demonstrate some signs of esophageal reflux and esophageal dysphagia, therefore an Esophagram would also be helpful in treating her swallowing difficulties.    If you agree to a Video Swallow Study WITH Esophagram please take the following steps to enter the order in Epic.    1. Enter  Video Swallow Study  order set  2. Complete Speech Therapy referral  a. F2 through each field for drop down speech diagnosis choices (i.e.,  Dysphagia )   b. Special Instructions to include  video swallow study.   3. Check EITHER  XR Video swallow w esophagram  OR  XR video swallow study w/o esophagram   4. Sign order set    Thank you,    Lilia Arriaga MS (Kate), CCC-SLP  Speech Language Pathologist, [facility]  Phone: [phone]   Email: [email or web address]  Days Worked: Tuesday, Wednesday, Friday
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
## 2018-08-15 NOTE — PATIENT INSTRUCTIONS
We addressed the following today:    1. Left shoulder pain/arthritis    Over the counter medication: Acetaminophen (Tylenol) 1000mg every 6 hours with food (Maximum of 3000mg/day)  Steroid injection of the left shoulder: intra-articular  was performed today in clinic with US guidance  Icing for the next 1-2 days may be helpful for pain. Injection may take 10-14 days to see the full effect.  Other specific instructions: Watch blood sugars closely over the next 5-7 days as could be elevated with completion of procedure  Follow up in 2 weeks if no improvement of symptoms for further evaluation/medical care (sooner if needed; call direct clinic number [[phone]] at any time with questions/concerns)

## 2018-08-15 NOTE — MR AVS SNAPSHOT
After Visit Summary   8/15/2018    Mariel Ribeiro    MRN: [id number]           Patient Information     Date Of Birth          1945        Visit Information        Provider Department      8/15/2018 9:00 AM Stephanie Wolf MD [facility]        Today's Diagnoses     CHF (congestive heart failure) (H)    -  1    Atherosclerosis of native coronary artery without angina pectoris, unspecified whether native or transplanted heart          Care Instructions    Cardiology Providers you saw during your visit:  Dr. Wolf    You will be admitted to the hospital for syncope.    Peter Khan, JODY  Cardiology Nurse Care Coordinator    Keep up the good work!    Take Care!          Follow-ups after your visit        Your next 10 appointments already scheduled     Oct 18, 2018  1:30 PM CDT   (Arrive by 1:15 PM)   RETURN DIABETES with Odalis Medley PA-C   [facility])    [address] [place] [id number] [phone]            Jan 14, 2019  2:30 PM CST   (Arrive by 2:15 PM)   RETURN DIABETES with Keven Jaeger MD   [facility])    [address] [place] [id number] [phone]              Future tests that were ordered for you today     Open Standing Orders        Priority Remaining Interval Expires Ordered    Potassium Routine 100/100 CONDITIONAL (SPECIFY)  8/15/2018    Magnesium Routine 100/100 CONDITIONAL (SPECIFY)  8/15/2018    Glucose monitor nursing POCT Routine [id number] PRN  8/15/2018    Glucose monitor nursing POCT Routine [id number] PRN  8/15/2018    Glucose monitor nursing POCT- IF PO (eating meals or on bolus enteral feedings), within 30 minutes prior to each meal and at bedtime. Routine 116/122 4 TIMES DAILY BEFORE MEALS & AT BEDTIME  8/15/2018    Glucose monitor nursing POCT Routine [id number] PRN  8/15/2018     "Glucose monitor nursing POCT Routine [id number] PRN  8/15/2018    Oxygen: Nasal cannula Routine [id number] PRN  8/15/2018    EKG 12-lead, tracing only STAT 100/100 CONDITIONAL (SPECIFY)  8/15/2018          Open Future Orders        Priority Expected Expires Ordered    XR Video Swallow w Esophagram Routine 8/15/2018 8/15/2019 8/15/2018            Who to contact     If you have questions or need follow up information about today's clinic visit or your schedule please contact [facility] directly at [phone].  Normal or non-critical lab and imaging results will be communicated to you by Solaicxhart, letter or phone within 4 business days after the clinic has received the results. If you do not hear from us within 7 days, please contact the clinic through Verican or phone. If you have a critical or abnormal lab result, we will notify you by phone as soon as possible.  Submit refill requests through Verican or call your pharmacy and they will forward the refill request to us. Please allow 3 business days for your refill to be completed.          Additional Information About Your Visit        Solaicxhart Information     Verican gives you secure access to your electronic health record. If you see a primary care provider, you can also send messages to your care team and make appointments. If you have questions, please call your primary care clinic.  If you do not have a primary care provider, please call [phone] and they will assist you.        Care EveryWhere ID     This is your Care EveryWhere ID. This could be used by other organizations to access your [facility] medical records  [id number]        Your Vitals Were     Pulse Height Pulse Oximetry BMI (Body Mass Index)          63 1.676 m (5' 6\") 94% 48.91 kg/m2         Blood Pressure from Last 3 Encounters:   08/16/18 115/65   08/15/18 98/63   08/15/18 98/63    Weight from Last 3 Encounters:   08/16/18 135.5 kg (298 lb 11.2 oz)   08/15/18 137.4 kg (303 lb) "   08/15/18 137.4 kg (303 lb)              Today, you had the following     No orders found for display         Today's Medication Changes          These changes are accurate as of 8/15/18 12:27 PM.  If you have any questions, ask your nurse or doctor.               Start taking these medicines.        Dose/Directions    lidocaine 1 % injection   Used for:  Osteoarthritis of left glenohumeral joint, Chronic left shoulder pain   Started by:  Sean Ramos, DO        Dose:  4 mL   4 mLs by Other route once for 1 dose   Quantity:  4 mL   Refills:  0       methylPREDNISolone acetate 40 MG/ML injection   Commonly known as:  DEPO-MEDROL   Used for:  Osteoarthritis of left glenohumeral joint, Chronic left shoulder pain   Started by:  Sean Ramos,         Dose:  40 mg   1 mL (40 mg) by INTRA-ARTICULAR route once for 1 dose   Quantity:  1 mL   Refills:  0            Where to get your medicines      Some of these will need a paper prescription and others can be bought over the counter.  Ask your nurse if you have questions.     You don't need a prescription for these medications     lidocaine 1 % injection    methylPREDNISolone acetate 40 MG/ML injection                Primary Care Provider Office Phone # Fax #    Rafaela William [phone] [address] [place] [address]        Equal Access to Services     KJ SALVADOR AH: Marcelina villelao Sopasha, waaxda luqadaha, qaybta kaalmada adeegyada, gopal bird. So [facility] [phone].    ATENCIÓN: Si habla español, tiene a gillespie disposición servicios gratuitos de asistencia lingüística. Llame al [phone].    We comply with applicable federal civil rights laws and [place] laws. We do not discriminate on the basis of race, color, national origin, age, disability, sex, sexual orientation, or gender identity.            Thank you!     Thank you for choosing [facility]  for your care. Our goal is  always to provide you with excellent care. Hearing back from our patients is one way we can continue to improve our services. Please take a few minutes to complete the written survey that you may receive in the mail after your visit with us. Thank you!             Your Updated Medication List - Protect others around you: Learn how to safely use, store and throw away your medicines at www.disposemymeds.org.          This list is accurate as of 8/15/18 12:27 PM.  Always use your most recent med list.                   Brand Name Dispense Instructions for use Diagnosis    acetaminophen 325 MG tablet    TYLENOL    100 tablet    Take 2 tablets (650 mg) by mouth every 4 hours as needed for mild pain    S/P CABG x 3, Acute post-operative pain       albuterol 108 (90 Base) MCG/ACT inhaler    PROAIR HFA    1 Inhaler    Inhale 1-2 puffs into the lungs every 4 hours as needed for wheezing    Chronic obstructive pulmonary disease, unspecified COPD type (H)       aspirin 325 MG EC tablet     30 tablet    Take 1 tablet by mouth daily.        atorvastatin 40 MG tablet    LIPITOR    30 tablet    Take 1 tablet (40 mg) by mouth daily    Atherosclerosis of native coronary artery without angina pectoris, unspecified whether native or transplanted heart       B-D U/F 31G X 5 MM   Generic drug:  insulin pen needle     100 each    USE FOR INSULIN INJECTION    Type 2 diabetes mellitus with diabetic polyneuropathy, without long-term current use of insulin (H)       blood glucose monitoring test strip    no brand specified    200 each    1 strip by In Vitro route 2 times daily Strips per patient's preference    Type 2 diabetes mellitus with diabetic polyneuropathy, without long-term current use of insulin (H)       bumetanide 2 MG tablet    BUMEX    180 tablet    Take 1 tablet (2 mg) by mouth 2 times daily    Atherosclerosis of native coronary artery without angina pectoris, unspecified whether native or transplanted heart        Cholecalciferol 05680 units Caps     30 capsule    Take 10,000 Units by mouth daily    Type 2 diabetes mellitus with diabetic chronic kidney disease, unspecified CKD stage, unspecified long term insulin use status (H)       clotrimazole 1 % cream    LOTRIMIN    30 g    Apply topically 2 times daily    Candidiasis of vulva and vagina       EPINEPHrine 0.3 MG/0.3ML injection 2-pack    EPIPEN/ADRENACLICK/or ANY BX GENERIC EQUIV    0.6 mL    Inject 0.3 mLs (0.3 mg) into the muscle once as needed for anaphylaxis    Allergic reaction, subsequent encounter       escitalopram 20 MG tablet    LEXAPRO    30 tablet    Take 1 tablet (20 mg) by mouth every morning    Recurrent major depressive disorder, in partial remission (H)       febuxostat 40 MG Tabs tablet    ULORIC    30 tablet    Take 1 tablet (40 mg) by mouth every evening    Vitamin D deficiency       folic acid 800 MCG Tabs     30 tablet    Take 800 mcg by mouth daily    Atherosclerosis of native coronary artery without angina pectoris, unspecified whether native or transplanted heart       guaiFENesin 600 MG 12 hr tablet    MUCINEX     Take 1 tablet (600 mg) by mouth 2 times daily        hydrALAZINE 25 MG tablet    APRESOLINE    270 tablet    Take 3 tablets (75 mg) by mouth 3 times daily    Atherosclerosis of native coronary artery without angina pectoris, unspecified whether native or transplanted heart       * insulin isophane human 100 UNIT/ML injection    HumuLIN N PEN    10.5 mL    Inject 35 Units Subcutaneous daily (with dinner)    Type 2 diabetes mellitus with diabetic chronic kidney disease, unspecified CKD stage, unspecified long term insulin use status (H)       * insulin isophane human 100 UNIT/ML injection    HumuLIN N PEN    10.5 mL    Inject 35 Units Subcutaneous every 24 hours    Type 2 diabetes mellitus with diabetic chronic kidney disease, unspecified CKD stage, unspecified long term insulin use status (H)       insulin syringes (disposable) U-100 1  ML Misc     100 each    1 each 5 times daily    Type 2 diabetes mellitus with chronic kidney disease, without long-term current use of insulin, unspecified CKD stage (H)       ketamine (KETALAR) 5%-gabapentin (NEURONTIN) 8% 5%-8% Gel topical PLO cream     30 g    Apply 30 g topically 3 times daily        levothyroxine 150 MCG tablet    SYNTHROID/LEVOTHROID    30 tablet    Take 1 tablet (150 mcg) by mouth daily    Other specified hypothyroidism       lidocaine 1 % injection     4 mL    4 mLs by Other route once for 1 dose    Osteoarthritis of left glenohumeral joint, Chronic left shoulder pain       [employer] FINEPOINT LANCETS Misc     100 each    Use to test blood sugars 2 times daily or as directed.    Type 2 diabetes mellitus with diabetic polyneuropathy, without long-term current use of insulin (H)       methylPREDNISolone acetate 40 MG/ML injection    DEPO-MEDROL    1 mL    1 mL (40 mg) by INTRA-ARTICULAR route once for 1 dose    Osteoarthritis of left glenohumeral joint, Chronic left shoulder pain       Metoprolol Tartrate 75 MG Tabs     180 tablet    Take 75 mg by mouth 2 times daily    Atherosclerosis of native coronary artery without angina pectoris, unspecified whether native or transplanted heart       niacin 500 MG CR tablet    SLO-NIACIN    60 tablet    Take 1 tablet (500 mg) by mouth 2 times daily    Atherosclerosis of native coronary artery without angina pectoris, unspecified whether native or transplanted heart       nitroGLYcerin 0.4 MG sublingual tablet    NITROSTAT    25 tablet    For chest pain place 1 tablet under the tongue every 5 minutes for 3 doses. If symptoms persist 5 minutes after 1st dose call 911.    Atherosclerosis of native coronary artery without angina pectoris, unspecified whether native or transplanted heart       ONE TOUCH ULTRA (DEVICES) Misc     1    test blood sugar BID    Type II or unspecified type diabetes mellitus without mention of complication, not stated as uncontrolled        polyethylene glycol Packet    MIRALAX/GLYCOLAX    7 packet    Take 17 g by mouth daily    Atherosclerosis of native coronary artery without angina pectoris, unspecified whether native or transplanted heart       potassium chloride SA 20 MEQ CR tablet    K-DUR/KLOR-CON M    60 tablet    Take 2 tablets (40 mEq) by mouth daily    Atherosclerosis of native coronary artery without angina pectoris, unspecified whether native or transplanted heart       pregabalin 100 MG capsule    LYRICA    90 capsule    Take 1 capsule (100 mg) by mouth 2 times daily    Pain in joint of left shoulder       repaglinide 1 MG tablet    PRANDIN    90 tablet    Take 1 tablet (1 mg) by mouth 3 times daily (before meals)    Type 2 diabetes mellitus with diabetic chronic kidney disease, unspecified CKD stage, unspecified long term insulin use status (H)       senna-docusate 8.6-50 MG per tablet    SENOKOT-S;PERICOLACE    30 tablet    Take 1-2 tablets by mouth 2 times daily as needed for constipation    Atherosclerosis of native coronary artery without angina pectoris, unspecified whether native or transplanted heart       TIZANIDINE HCL PO      Take 4 mg by mouth At Bedtime        traZODone 50 MG tablet    DESYREL    270 tablet    Take 3 tablets (150 mg) by mouth At Bedtime    Recurrent major depressive disorder, in partial remission (H)       * Notice:  This list has 2 medication(s) that are the same as other medications prescribed for you. Read the directions carefully, and ask your doctor or other care provider to review them with you.

## 2018-08-15 NOTE — MR AVS SNAPSHOT
After Visit Summary   8/15/2018    Mariel Ribeiro    MRN: [id number]           Patient Information     Date Of Birth          1945        Visit Information        Provider Department      8/15/2018 10:00 AM Sena Ramos DO [facility] Physical Medicine and Rehabilitation        Today's Diagnoses     Osteoarthritis of left glenohumeral joint    -  1    Chronic left shoulder pain          Care Instructions    We addressed the following today:    1. Left shoulder pain/arthritis    Over the counter medication: Acetaminophen (Tylenol) 1000mg every 6 hours with food (Maximum of 3000mg/day)  Steroid injection of the left shoulder: intra-articular  was performed today in clinic with US guidance  Icing for the next 1-2 days may be helpful for pain. Injection may take 10-14 days to see the full effect.  Other specific instructions: Watch blood sugars closely over the next 5-7 days as could be elevated with completion of procedure  Follow up in 2 weeks if no improvement of symptoms for further evaluation/medical care (sooner if needed; call direct clinic number [[phone]] at any time with questions/concerns)            Follow-ups after your visit        Follow-up notes from your care team     Return in about 2 weeks (around 8/29/2018).      Your next 10 appointments already scheduled     Oct 18, 2018  1:30 PM CDT   (Arrive by 1:15 PM)   RETURN DIABETES with Odalis Medley PA-C   [facility] Endocrinology ([facility] [place])    [address] [place] [id number] [phone]            Jan 14, 2019  2:30 PM CST   (Arrive by 2:15 PM)   RETURN DIABETES with Keven Jaeger MD   [facility] Endocrinology ([facility] [place])    [address] [place] [id number] [phone]              Who to contact     Please call your clinic at [phone] to:    Ask questions about your health    Make or cancel  "appointments    Discuss your medicines    Learn about your test results    Speak to your doctor            Additional Information About Your Visit        Art [employer] Information     [employer] gives you secure access to your electronic health record. If you see a primary care provider, you can also send messages to your care team and make appointments. If you have questions, please call your primary care clinic.  If you do not have a primary care provider, please call [phone] and they will assist you.      [employer] is an electronic gateway that provides easy, online access to your medical records. With [employer], you can request a clinic appointment, read your test results, renew a prescription or communicate with your care team.     To access your existing account, please contact your [facility] or call [phone] for assistance.        Care EveryWhere ID     This is your Care EveryWhere ID. This could be used by other organizations to access your [facility] medical records  [id number]        Your Vitals Were     Pulse Height Pulse Oximetry Breastfeeding? BMI (Body Mass Index)       63 1.676 m (5' 6\") 94% No 48.91 kg/m2        Blood Pressure from Last 3 Encounters:   08/15/18 98/63   08/15/18 98/63   07/30/18 113/70    Weight from Last 3 Encounters:   08/15/18 137.4 kg (303 lb)   08/15/18 137.4 kg (303 lb)   07/24/18 138.6 kg (305 lb 8 oz)              We Performed the Following     HC ARTHROCENTESIS ASPIR&/INJ MAJOR JT/BURSA W/US     METHYLPREDNISOLONE 40 MG INJ          Today's Medication Changes          These changes are accurate as of 8/15/18 11:45 AM.  If you have any questions, ask your nurse or doctor.               Start taking these medicines.        Dose/Directions    lidocaine 1 % injection   Used for:  Osteoarthritis of left glenohumeral joint, Chronic left shoulder pain   Started by:  Sean Ramos,         Dose:  4 mL   4 mLs by Other route once for 1 dose   Quantity: "  4 mL   Refills:  0       methylPREDNISolone acetate 40 MG/ML injection   Commonly known as:  DEPO-MEDROL   Used for:  Osteoarthritis of left glenohumeral joint, Chronic left shoulder pain   Started by:  Sean Ramos DO        Dose:  40 mg   1 mL (40 mg) by INTRA-ARTICULAR route once for 1 dose   Quantity:  1 mL   Refills:  0            Where to get your medicines      Some of these will need a paper prescription and others can be bought over the counter.  Ask your nurse if you have questions.     You don't need a prescription for these medications     lidocaine 1 % injection    methylPREDNISolone acetate 40 MG/ML injection                Primary Care Provider Office Phone # Fax #    Rafaela William [phone] [address] [place] [address]        Equal Access to Services     KJ SALVADOR : Marcelina fowler [place], waaxda luqadaha, qaybta kaalmada adegladysyajanett, gopal pena . So [facility] [phone].    ATENCIÓN: Si habla español, tiene a gillespie disposición servicios gratuitos de asistencia lingüística. [facility] [phone].    We comply with applicable federal civil rights laws and [place] laws. We do not discriminate on the basis of race, color, national origin, age, disability, sex, sexual orientation, or gender identity.            Thank you!     Thank you for choosing [facility] PHYSICAL MEDICINE AND REHABILITATION  for your care. Our goal is always to provide you with excellent care. Hearing back from our patients is one way we can continue to improve our services. Please take a few minutes to complete the written survey that you may receive in the mail after your visit with us. Thank you!             Your Updated Medication List - Protect others around you: Learn how to safely use, store and throw away your medicines at www.disposemymeds.org.          This list is accurate as of 8/15/18 11:45 AM.  Always use your most recent med list.                    Brand Name Dispense Instructions for use Diagnosis    acetaminophen 325 MG tablet    TYLENOL    100 tablet    Take 2 tablets (650 mg) by mouth every 4 hours as needed for mild pain    S/P CABG x 3, Acute post-operative pain       albuterol 108 (90 Base) MCG/ACT inhaler    PROAIR HFA    1 Inhaler    Inhale 1-2 puffs into the lungs every 4 hours as needed for wheezing    Chronic obstructive pulmonary disease, unspecified COPD type (H)       aspirin 325 MG EC tablet     30 tablet    Take 1 tablet by mouth daily.        atorvastatin 40 MG tablet    LIPITOR    30 tablet    Take 1 tablet (40 mg) by mouth daily    Atherosclerosis of native coronary artery without angina pectoris, unspecified whether native or transplanted heart       B-D U/F 31G X 5 MM   Generic drug:  insulin pen needle     100 each    USE FOR INSULIN INJECTION    Type 2 diabetes mellitus with diabetic polyneuropathy, without long-term current use of insulin (H)       blood glucose monitoring test strip    no brand specified    200 each    1 strip by In Vitro route 2 times daily Strips per patient's preference    Type 2 diabetes mellitus with diabetic polyneuropathy, without long-term current use of insulin (H)       bumetanide 2 MG tablet    BUMEX    180 tablet    Take 1 tablet (2 mg) by mouth 2 times daily    Atherosclerosis of native coronary artery without angina pectoris, unspecified whether native or transplanted heart       Cholecalciferol 16263 units Caps     30 capsule    Take 10,000 Units by mouth daily    Type 2 diabetes mellitus with diabetic chronic kidney disease, unspecified CKD stage, unspecified long term insulin use status (H)       clotrimazole 1 % cream    LOTRIMIN    30 g    Apply topically 2 times daily    Candidiasis of vulva and vagina       EPINEPHrine 0.3 MG/0.3ML injection 2-pack    EPIPEN/ADRENACLICK/or ANY BX GENERIC EQUIV    0.6 mL    Inject 0.3 mLs (0.3 mg) into the muscle once as needed for anaphylaxis     Allergic reaction, subsequent encounter       escitalopram 20 MG tablet    LEXAPRO    30 tablet    Take 1 tablet (20 mg) by mouth every morning    Recurrent major depressive disorder, in partial remission (H)       febuxostat 40 MG Tabs tablet    ULORIC    30 tablet    Take 1 tablet (40 mg) by mouth every evening    Vitamin D deficiency       folic acid 800 MCG Tabs     30 tablet    Take 800 mcg by mouth daily    Atherosclerosis of native coronary artery without angina pectoris, unspecified whether native or transplanted heart       guaiFENesin 600 MG 12 hr tablet    MUCINEX     Take 1 tablet (600 mg) by mouth 2 times daily        hydrALAZINE 25 MG tablet    APRESOLINE    270 tablet    Take 3 tablets (75 mg) by mouth 3 times daily    Atherosclerosis of native coronary artery without angina pectoris, unspecified whether native or transplanted heart       * insulin isophane human 100 UNIT/ML injection    HumuLIN N PEN    10.5 mL    Inject 35 Units Subcutaneous daily (with dinner)    Type 2 diabetes mellitus with diabetic chronic kidney disease, unspecified CKD stage, unspecified long term insulin use status (H)       * insulin isophane human 100 UNIT/ML injection    HumuLIN N PEN    10.5 mL    Inject 35 Units Subcutaneous every 24 hours    Type 2 diabetes mellitus with diabetic chronic kidney disease, unspecified CKD stage, unspecified long term insulin use status (H)       insulin syringes (disposable) U-100 1 ML Misc     100 each    1 each 5 times daily    Type 2 diabetes mellitus with chronic kidney disease, without long-term current use of insulin, unspecified CKD stage (H)       ketamine (KETALAR) 5%-gabapentin (NEURONTIN) 8% 5%-8% Gel topical PLO cream     30 g    Apply 30 g topically 3 times daily        levothyroxine 150 MCG tablet    SYNTHROID/LEVOTHROID    30 tablet    Take 1 tablet (150 mcg) by mouth daily    Other specified hypothyroidism       lidocaine 1 % injection     4 mL    4 mLs by Other route once  for 1 dose    Osteoarthritis of left glenohumeral joint, Chronic left shoulder pain       TextDigger FINEPOINT LANCETS Misc     100 each    Use to test blood sugars 2 times daily or as directed.    Type 2 diabetes mellitus with diabetic polyneuropathy, without long-term current use of insulin (H)       methylPREDNISolone acetate 40 MG/ML injection    DEPO-MEDROL    1 mL    1 mL (40 mg) by INTRA-ARTICULAR route once for 1 dose    Osteoarthritis of left glenohumeral joint, Chronic left shoulder pain       Metoprolol Tartrate 75 MG Tabs     180 tablet    Take 75 mg by mouth 2 times daily    Atherosclerosis of native coronary artery without angina pectoris, unspecified whether native or transplanted heart       niacin 500 MG CR tablet    SLO-NIACIN    60 tablet    Take 1 tablet (500 mg) by mouth 2 times daily    Atherosclerosis of native coronary artery without angina pectoris, unspecified whether native or transplanted heart       nitroGLYcerin 0.4 MG sublingual tablet    NITROSTAT    25 tablet    For chest pain place 1 tablet under the tongue every 5 minutes for 3 doses. If symptoms persist 5 minutes after 1st dose call 911.    Atherosclerosis of native coronary artery without angina pectoris, unspecified whether native or transplanted heart       ONE TOUCH ULTRA (DEVICES) Misc     1    test blood sugar BID    Type II or unspecified type diabetes mellitus without mention of complication, not stated as uncontrolled       polyethylene glycol Packet    MIRALAX/GLYCOLAX    7 packet    Take 17 g by mouth daily    Atherosclerosis of native coronary artery without angina pectoris, unspecified whether native or transplanted heart       potassium chloride SA 20 MEQ CR tablet    K-DUR/KLOR-CON M    60 tablet    Take 2 tablets (40 mEq) by mouth daily    Atherosclerosis of native coronary artery without angina pectoris, unspecified whether native or transplanted heart       pregabalin 100 MG capsule    LYRICA    90 capsule    Take  1 capsule (100 mg) by mouth 2 times daily    Pain in joint of left shoulder       repaglinide 1 MG tablet    PRANDIN    90 tablet    Take 1 tablet (1 mg) by mouth 3 times daily (before meals)    Type 2 diabetes mellitus with diabetic chronic kidney disease, unspecified CKD stage, unspecified long term insulin use status (H)       senna-docusate 8.6-50 MG per tablet    SENOKOT-S;PERICOLACE    30 tablet    Take 1-2 tablets by mouth 2 times daily as needed for constipation    Atherosclerosis of native coronary artery without angina pectoris, unspecified whether native or transplanted heart       TIZANIDINE HCL PO           traZODone 50 MG tablet    DESYREL    270 tablet    Take 3 tablets (150 mg) by mouth At Bedtime    Recurrent major depressive disorder, in partial remission (H)       * Notice:  This list has 2 medication(s) that are the same as other medications prescribed for you. Read the directions carefully, and ask your doctor or other care provider to review them with you.

## 2018-08-15 NOTE — IP AVS SNAPSHOT
MRN:[id number]                      After Visit Summary   8/15/2018    Mariel Ribeiro    MRN: [id number]           Thank you!     Thank you for choosing [facility] for your care. Our goal is always to provide you with excellent care. Hearing back from our patients is one way we can continue to improve our services. Please take a few minutes to complete the written survey that you may receive in the mail after you visit with us. Thank you!        Patient Information     Date Of Birth          1945        Designated Caregiver       Most Recent Value    Caregiver    Will someone help with your care after discharge? yes    Name of designated caregiver geeta    Phone number of caregiver [phone]    Caregiver address [address] [place] [address]      About your hospital stay     You were admitted on:  August 15, 2018 You last received care in the:  [address] [facility]    You were discharged on:  August 16, 2018        Reason for your hospital stay       You were hospitalized because you fainted in clinic and have ongoing shortness of breath. We performed diagnostic tests and changed some of your medications.                  Who to Call     For medical emergencies, please call 911.  For non-urgent questions about your medical care, please call your primary care provider or clinic, [phone]          Attending Provider     Provider Specialty    Anjelica Roajs MD Cardiology       Primary Care Provider Office Phone # Fax #    Rafaela William [phone]      After Care Instructions     Activity       Your activity upon discharge: activity as tolerated            Diet       Follow this diet upon discharge:       Fluid restriction 1500 ML FLUID      Diabetic diet; High Fiber Diet; 2 gm sodium diet; Low Saturated Fat Na <2400mg Diet; No Salt Packet            Discharge Instructions       You were hospitalized because you fainted in clinic and have ongoing shortness of breath. We  performed diagnostic tests and changed some of your medications.    Follow up in Heart Failure (CORE) clinic next week.     Stop taking hydralazine. Decrease your bumetanide dose to 2mg in the morning and 1mg in the afternoon. Start taking losartan, 12.5mg in the morning. Start taking ferrous gluconate to replenish your iron - take the iron pills every Monday, Wednesday and Friday.            Monitor and record       blood pressure daily  fluid intake and output daily  Limit intake to less than 1.5 liters per day   weight every day                  Follow-up Appointments     Adult [facility] Follow-up and recommended labs and tests       Follow up in CORE clinic next week (8/20-8/24) (with BMP + Mg)    Appointments on [place] and/or [facility] (with [facility] or [facility] provider or service). Call [phone] if you haven't heard regarding these appointments within 7 days of discharge.                  Your next 10 appointments already scheduled     Aug 17, 2018  6:00 AM CDT   Inpatient FEES Evaluation with Marry Pinon, SLP   [facility], [place], Speech Therapy ([facility], [facility])    [address] [address] [facility] [address]               Oct 18, 2018  1:30 PM CDT   (Arrive by 1:15 PM)   RETURN DIABETES with Odalis Medley PA-C   [facility] Endocrinology ([facility])    [address] [place] [address] [phone]            Jan 14, 2019  2:30 PM CST   (Arrive by 2:15 PM)   RETURN DIABETES with Keven Jaeger MD   [facility] Endocrinology ([facility])    [address] [place] [address] [phone]              Additional Services     Home care nursing referral       [facility]   Phone: [phone]     For resumption of care.   RN evaluation post hospitalization. Assess vital signs, respiratory and cardiac status, activity tolerance,  "hydration, nutritional status, med setup and management. Please follow up with pt on her home CPAP machine.   PT/OT eval and treat for deconditioning, strengthening and endurance.   HHA as per previous orders for assistance with ADLS.   Speech therapy eval and treat.     Your provider has ordered home care nursing services. If you have not been contacted within 2 days of your discharge please call the inpatient department phone number at [phone] .            Medication Therapy Management Referral       MTM referral reason            Patient had a hospital or ED visit in last 6 months and has more than 10   PTA or Discharge medications    Patient has 5 PTA or Discharge Medications AND one of the following   diagnoses: DM,HF,COPD,AMI DX,PULM HTN       This service is designed to help you get the most from your medications.  A specially trained pharmacist will work closely with you and your doctors  to solve any problems related to your medications and to help you get the   best results from taking them.      The Medication Therapy Management staff will call you to schedule an appointment.                  Pending Results     Date and Time Order Name Status Description    8/16/2018 1459 [facility] Guide for Thorcentesis In process             Statement of Approval     Ordered          08/16/18 8672  I have reviewed and agree with all the recommendations and orders detailed in this document.  EFFECTIVE NOW     Approved and electronically signed by:  Jefry Camargo MD             Admission Information     Date & Time Provider Department Dept. Phone    8/15/2018 Anjelica Rojas MD [address] [facility] [phone]      Your Vitals Were     Blood Pressure Temperature Respirations Height Weight Pulse Oximetry    130/52 (BP Location: Right arm) 96.8  F (36  C) (Axillary) 18 1.676 m (5' 6\") 135.5 kg (298 lb 11.2 oz) 100%    BMI (Body Mass Index)                   48.21 kg/m2           MyChart Information     [employer] gives " you secure access to your electronic health record. If you see a primary care provider, you can also send messages to your care team and make appointments. If you have questions, please call your primary care clinic.  If you do not have a primary care provider, please call [phone] and they will assist you.        Care EveryWhere ID     This is your Care EveryWhere ID. This could be used by other organizations to access your [place] medical records  [id number]        Equal Access to Services     KJ SALVADOR : [place]cl[place] villelao [place], waaxda luqadaha, qaybta kaalmada brandon, gopal bird. So [facility] [phone].    ATENCIÓN: Si razla mary, tiene a gillespie disposición servicios gratuitos de asistencia lingüística. Llame al [phone].    We comply with applicable federal civil rights laws and [place] laws. We do not discriminate on the basis of race, color, national origin, age, disability, sex, sexual orientation, or gender identity.               Review of your medicines      START taking        Dose / Directions    ferrous gluconate 324 (38 Fe) MG tablet   Commonly known as:  FERGON   Used for:  Chronic diastolic heart failure (H), Iron deficiency anemia secondary to inadequate dietary iron intake        Dose:  324 mg   Start taking on:  8/17/2018   Take 1 tablet (324 mg) by mouth Every Mon, Wed, Fri Morning   Quantity:  12 tablet   Refills:  2       losartan 25 MG tablet   Commonly known as:  COZAAR   Used for:  Chronic diastolic heart failure (H)        Dose:  12.5 mg   Take 0.5 tablets (12.5 mg) by mouth daily   Quantity:  15 tablet   Refills:  1       miconazole 2 % powder   Commonly known as:  MICATIN; MICRO GUARD   Used for:  Dermatitis        Apply topically 2 times daily   Quantity:  71 g   Refills:  1         CONTINUE these medicines which may have CHANGED, or have new prescriptions. If we are uncertain of the size of tablets/capsules you have at home, strength  may be listed as something that might have changed.        Dose / Directions    * bumetanide 2 MG tablet   Commonly known as:  BUMEX   This may have changed:  when to take this   Used for:  Chronic diastolic heart failure (H)        Dose:  2 mg   Start taking on:  8/17/2018   Take 1 tablet (2 mg) by mouth every morning   Quantity:  30 tablet   Refills:  1       * bumetanide 1 MG tablet   Commonly known as:  BUMEX   This may have changed:  You were already taking a medication with the same name, and this prescription was added. Make sure you understand how and when to take each.   Used for:  Chronic diastolic heart failure (H)        Dose:  1 mg   Start taking on:  8/17/2018   Take 1 tablet (1 mg) by mouth every evening   Quantity:  30 tablet   Refills:  1       * clotrimazole 1 % cream   Commonly known as:  LOTRIMIN   This may have changed:  Another medication with the same name was added. Make sure you understand how and when to take each.   Used for:  Candidiasis of vulva and vagina        Apply topically 2 times daily   Quantity:  30 g   Refills:  1       * clotrimazole 1 % cream   Commonly known as:  LOTRIMIN   This may have changed:  You were already taking a medication with the same name, and this prescription was added. Make sure you understand how and when to take each.   Used for:  Dermatitis        Apply topically 2 times daily   Quantity:  60 g   Refills:  3       * Notice:  This list has 4 medication(s) that are the same as other medications prescribed for you. Read the directions carefully, and ask your doctor or other care provider to review them with you.      CONTINUE these medicines which have NOT CHANGED        Dose / Directions    acetaminophen 325 MG tablet   Commonly known as:  TYLENOL   Used for:  S/P CABG x 3, Acute post-operative pain        Dose:  650 mg   Take 2 tablets (650 mg) by mouth every 4 hours as needed for mild pain   Quantity:  100 tablet   Refills:  0       albuterol 108 (90  Base) MCG/ACT inhaler   Commonly known as:  PROAIR HFA   Used for:  Chronic obstructive pulmonary disease, unspecified COPD type (H)        Dose:  1-2 puff   Inhale 1-2 puffs into the lungs every 4 hours as needed for wheezing   Quantity:  1 Inhaler   Refills:  0       aspirin 325 MG EC tablet        Dose:  325 mg   Take 1 tablet by mouth daily.   Quantity:  30 tablet   Refills:  0       atorvastatin 40 MG tablet   Commonly known as:  LIPITOR   Used for:  Atherosclerosis of native coronary artery without angina pectoris, unspecified whether native or transplanted heart        Dose:  40 mg   Take 1 tablet (40 mg) by mouth daily   Quantity:  30 tablet   Refills:  0       B-D U/F 31G X 5 MM   Used for:  Type 2 diabetes mellitus with diabetic polyneuropathy, without long-term current use of insulin (H)   Generic drug:  insulin pen needle        USE FOR INSULIN INJECTION   Quantity:  100 each   Refills:  0       blood glucose monitoring test strip   Commonly known as:  no brand specified   Used for:  Type 2 diabetes mellitus with diabetic polyneuropathy, without long-term current use of insulin (H)        Dose:  1 strip   1 strip by In Vitro route 2 times daily Strips per patient's preference   Quantity:  200 each   Refills:  3       Cholecalciferol 05981 units Caps   Indication:  Liquid gel caps   Used for:  Type 2 diabetes mellitus with diabetic chronic kidney disease, unspecified CKD stage, unspecified long term insulin use status (H)        Dose:  20598 Units   Take 10,000 Units by mouth daily   Quantity:  30 capsule   Refills:  0       EPINEPHrine 0.3 MG/0.3ML injection 2-pack   Commonly known as:  EPIPEN/ADRENACLICK/or ANY BX GENERIC EQUIV   Used for:  Allergic reaction, subsequent encounter        Dose:  0.3 mg   Inject 0.3 mLs (0.3 mg) into the muscle once as needed for anaphylaxis   Quantity:  0.6 mL   Refills:  0       escitalopram 20 MG tablet   Commonly known as:  LEXAPRO   Used for:  Recurrent major  depressive disorder, in partial remission (H)        Dose:  20 mg   Take 1 tablet (20 mg) by mouth every morning   Quantity:  30 tablet   Refills:  0       febuxostat 40 MG Tabs tablet   Commonly known as:  ULORIC   Used for:  Vitamin D deficiency        Dose:  40 mg   Take 1 tablet (40 mg) by mouth every evening   Quantity:  30 tablet   Refills:  0       folic acid 800 MCG Tabs   Used for:  Atherosclerosis of native coronary artery without angina pectoris, unspecified whether native or transplanted heart        Dose:  800 mcg   Take 800 mcg by mouth daily   Quantity:  30 tablet   Refills:  0       guaiFENesin 600 MG 12 hr tablet   Commonly known as:  MUCINEX        Dose:  600 mg   Take 1 tablet (600 mg) by mouth 2 times daily   Refills:  0       * insulin isophane human 100 UNIT/ML injection   Commonly known as:  HumuLIN N PEN   Used for:  Type 2 diabetes mellitus with diabetic chronic kidney disease, unspecified CKD stage, unspecified long term insulin use status (H)        Dose:  35 Units   Inject 35 Units Subcutaneous daily (with dinner)   Quantity:  10.5 mL   Refills:  0       * insulin isophane human 100 UNIT/ML injection   Commonly known as:  HumuLIN N PEN   Used for:  Type 2 diabetes mellitus with diabetic chronic kidney disease, unspecified CKD stage, unspecified long term insulin use status (H)        Dose:  35 Units   Inject 35 Units Subcutaneous every 24 hours   Quantity:  10.5 mL   Refills:  0       insulin syringes (disposable) U-100 1 ML Misc   Used for:  Type 2 diabetes mellitus with chronic kidney disease, without long-term current use of insulin, unspecified CKD stage (H)        Dose:  1 each   1 each 5 times daily   Quantity:  100 each   Refills:  11       ketamine (KETALAR) 5%-gabapentin (NEURONTIN) 8% 5%-8% Gel topical PLO cream        Dose:  30 g   Apply 30 g topically 3 times daily   Quantity:  30 g   Refills:  0       levothyroxine 150 MCG tablet   Commonly known as:  SYNTHROID/LEVOTHROID    Used for:  Other specified hypothyroidism        Dose:  150 mcg   Take 1 tablet (150 mcg) by mouth daily   Quantity:  30 tablet   Refills:  0       [employer] FINEPOINT LANCETS Misc   Used for:  Type 2 diabetes mellitus with diabetic polyneuropathy, without long-term current use of insulin (H)        Use to test blood sugars 2 times daily or as directed.   Quantity:  100 each   Refills:  prn       Metoprolol Tartrate 75 MG Tabs   Used for:  Atherosclerosis of native coronary artery without angina pectoris, unspecified whether native or transplanted heart        Dose:  75 mg   Take 75 mg by mouth 2 times daily   Quantity:  180 tablet   Refills:  3       niacin 500 MG CR tablet   Commonly known as:  SLO-NIACIN   Used for:  Atherosclerosis of native coronary artery without angina pectoris, unspecified whether native or transplanted heart        Dose:  500 mg   Take 1 tablet (500 mg) by mouth 2 times daily   Quantity:  60 tablet   Refills:  0       nitroGLYcerin 0.4 MG sublingual tablet   Commonly known as:  NITROSTAT   Used for:  Atherosclerosis of native coronary artery without angina pectoris, unspecified whether native or transplanted heart        For chest pain place 1 tablet under the tongue every 5 minutes for 3 doses. If symptoms persist 5 minutes after 1st dose call 911.   Quantity:  25 tablet   Refills:  0       ONE TOUCH ULTRA (DEVICES) Misc   Used for:  Type II or unspecified type diabetes mellitus without mention of complication, not stated as uncontrolled        test blood sugar BID   Quantity:  1   Refills:  0       polyethylene glycol Packet   Commonly known as:  MIRALAX/GLYCOLAX   Used for:  Atherosclerosis of native coronary artery without angina pectoris, unspecified whether native or transplanted heart        Dose:  17 g   Take 17 g by mouth daily   Quantity:  7 packet   Refills:  0       potassium chloride SA 20 MEQ CR tablet   Commonly known as:  K-DUR/KLOR-CON M   Used for:  Atherosclerosis of  native coronary artery without angina pectoris, unspecified whether native or transplanted heart        Dose:  40 mEq   Take 2 tablets (40 mEq) by mouth daily   Quantity:  60 tablet   Refills:  0       pregabalin 100 MG capsule   Commonly known as:  LYRICA   Used for:  Pain in joint of left shoulder        Dose:  100 mg   Take 1 capsule (100 mg) by mouth 2 times daily   Quantity:  90 capsule   Refills:  0       repaglinide 1 MG tablet   Commonly known as:  PRANDIN   Used for:  Type 2 diabetes mellitus with diabetic chronic kidney disease, unspecified CKD stage, unspecified long term insulin use status (H)        Dose:  1 mg   Take 1 tablet (1 mg) by mouth 3 times daily (before meals)   Quantity:  90 tablet   Refills:  3       senna-docusate 8.6-50 MG per tablet   Commonly known as:  SENOKOT-S;PERICOLACE   Used for:  Atherosclerosis of native coronary artery without angina pectoris, unspecified whether native or transplanted heart        Dose:  1-2 tablet   Take 1-2 tablets by mouth 2 times daily as needed for constipation   Quantity:  30 tablet   Refills:  0       TIZANIDINE HCL PO   Indication:  restless leg        Dose:  4 mg   Take 4 mg by mouth At Bedtime   Refills:  0       traZODone 50 MG tablet   Commonly known as:  DESYREL   Used for:  Recurrent major depressive disorder, in partial remission (H)        Dose:  150 mg   Take 3 tablets (150 mg) by mouth At Bedtime   Quantity:  270 tablet   Refills:  1       * Notice:  This list has 2 medication(s) that are the same as other medications prescribed for you. Read the directions carefully, and ask your doctor or other care provider to review them with you.      STOP taking     hydrALAZINE 25 MG tablet   Commonly known as:  APRESOLINE           lidocaine 1 % injection           methylPREDNISolone acetate 40 MG/ML injection   Commonly known as:  DEPO-MEDROL                Where to get your medicines      These medications were sent to [id number] -  [place], [place] - [address] AT [facility]  [address], [place] [address]    Hours:  24-hours Phone:  [phone]     bumetanide 1 MG tablet    bumetanide 2 MG tablet    clotrimazole 1 % cream    ferrous gluconate 324 (38 Fe) MG tablet    losartan 25 MG tablet    miconazole 2 % powder                Protect others around you: Learn how to safely use, store and throw away your medicines at www.disposemymeds.org.             Medication List: This is a list of all your medications and when to take them. Check marks below indicate your daily home schedule. Keep this list as a reference.      Medications           Morning Afternoon Evening Bedtime As Needed    acetaminophen 325 MG tablet   Commonly known as:  TYLENOL   Take 2 tablets (650 mg) by mouth every 4 hours as needed for mild pain   Last time this was given:  650 mg on 8/16/2018  4:48 PM                                albuterol 108 (90 Base) MCG/ACT inhaler   Commonly known as:  PROAIR HFA   Inhale 1-2 puffs into the lungs every 4 hours as needed for wheezing                                aspirin 325 MG EC tablet   Take 1 tablet by mouth daily.   Last time this was given:  325 mg on 8/16/2018  8:49 AM                                atorvastatin 40 MG tablet   Commonly known as:  LIPITOR   Take 1 tablet (40 mg) by mouth daily   Last time this was given:  40 mg on 8/16/2018  8:46 AM                                B-D U/F 31G X 5 MM   USE FOR INSULIN INJECTION   Generic drug:  insulin pen needle                                blood glucose monitoring test strip   Commonly known as:  no brand specified   1 strip by In Vitro route 2 times daily Strips per patient's preference                                * bumetanide 2 MG tablet   Commonly known as:  BUMEX   Take 1 tablet (2 mg) by mouth every morning   Start taking on:  8/17/2018                                * bumetanide 1 MG tablet   Commonly known as:  BUMEX   Take 1 tablet  (1 mg) by mouth every evening   Start taking on:  8/17/2018                                Cholecalciferol 69960 units Caps   Take 10,000 Units by mouth daily   Last time this was given:  10,000 Units on 8/16/2018  8:47 AM                                * clotrimazole 1 % cream   Commonly known as:  LOTRIMIN   Apply topically 2 times daily   Last time this was given:  8/16/2018  9:01 AM                                * clotrimazole 1 % cream   Commonly known as:  LOTRIMIN   Apply topically 2 times daily   Last time this was given:  8/16/2018  9:01 AM                                EPINEPHrine 0.3 MG/0.3ML injection 2-pack   Commonly known as:  EPIPEN/ADRENACLICK/or ANY BX GENERIC EQUIV   Inject 0.3 mLs (0.3 mg) into the muscle once as needed for anaphylaxis                                escitalopram 20 MG tablet   Commonly known as:  LEXAPRO   Take 1 tablet (20 mg) by mouth every morning   Last time this was given:  20 mg on 8/16/2018  8:47 AM                                febuxostat 40 MG Tabs tablet   Commonly known as:  ULORIC   Take 1 tablet (40 mg) by mouth every evening   Last time this was given:  40 mg on 8/15/2018  8:32 PM                                ferrous gluconate 324 (38 Fe) MG tablet   Commonly known as:  FERGON   Take 1 tablet (324 mg) by mouth Every Mon, Wed, Fri Morning   Start taking on:  8/17/2018                                folic acid 800 MCG Tabs   Take 800 mcg by mouth daily   Last time this was given:  800 mcg on 8/16/2018  8:47 AM                                guaiFENesin 600 MG 12 hr tablet   Commonly known as:  MUCINEX   Take 1 tablet (600 mg) by mouth 2 times daily   Last time this was given:  600 mg on 8/15/2018  8:32 PM                                * insulin isophane human 100 UNIT/ML injection   Commonly known as:  HumuLIN N PEN   Inject 35 Units Subcutaneous daily (with dinner)   Last time this was given:  35 Units on 8/15/2018  6:47 PM                                *  insulin isophane human 100 UNIT/ML injection   Commonly known as:  HumuLIN N PEN   Inject 35 Units Subcutaneous every 24 hours   Last time this was given:  35 Units on 8/15/2018  6:47 PM                                insulin syringes (disposable) U-100 1 ML Misc   1 each 5 times daily                                ketamine (KETALAR) 5%-gabapentin (NEURONTIN) 8% 5%-8% Gel topical PLO cream   Apply 30 g topically 3 times daily   Last time this was given:  30 g on 8/16/2018  5:49 PM                                levothyroxine 150 MCG tablet   Commonly known as:  SYNTHROID/LEVOTHROID   Take 1 tablet (150 mcg) by mouth daily   Last time this was given:  150 mcg on 8/16/2018  8:46 AM                                Con[employer] FINEPOINT LANCETS Misc   Use to test blood sugars 2 times daily or as directed.                                losartan 25 MG tablet   Commonly known as:  COZAAR   Take 0.5 tablets (12.5 mg) by mouth daily   Last time this was given:  12.5 mg on 8/16/2018  4:48 PM                                Metoprolol Tartrate 75 MG Tabs   Take 75 mg by mouth 2 times daily   Last time this was given:  75 mg on 8/16/2018  8:48 AM                                miconazole 2 % powder   Commonly known as:  MICATIN; MICRO GUARD   Apply topically 2 times daily   Last time this was given:  8/16/2018  9:01 AM                                niacin 500 MG CR tablet   Commonly known as:  SLO-NIACIN   Take 1 tablet (500 mg) by mouth 2 times daily                                nitroGLYcerin 0.4 MG sublingual tablet   Commonly known as:  NITROSTAT   For chest pain place 1 tablet under the tongue every 5 minutes for 3 doses. If symptoms persist 5 minutes after 1st dose call 911.                                ONE TOUCH ULTRA (DEVICES) Misc   test blood sugar BID                                polyethylene glycol Packet   Commonly known as:  MIRALAX/GLYCOLAX   Take 17 g by mouth daily                                potassium  chloride SA 20 MEQ CR tablet   Commonly known as:  K-DUR/KLOR-CON M   Take 2 tablets (40 mEq) by mouth daily   Last time this was given:  20 mEq on 8/16/2018  5:15 PM                                pregabalin 100 MG capsule   Commonly known as:  LYRICA   Take 1 capsule (100 mg) by mouth 2 times daily   Last time this was given:  100 mg on 8/16/2018  8:46 AM                                repaglinide 1 MG tablet   Commonly known as:  PRANDIN   Take 1 tablet (1 mg) by mouth 3 times daily (before meals)                                senna-docusate 8.6-50 MG per tablet   Commonly known as:  SENOKOT-S;PERICOLACE   Take 1-2 tablets by mouth 2 times daily as needed for constipation                                TIZANIDINE HCL PO   Take 4 mg by mouth At Bedtime   Last time this was given:  4 mg on 8/15/2018  9:50 PM                                traZODone 50 MG tablet   Commonly known as:  DESYREL   Take 3 tablets (150 mg) by mouth At Bedtime   Last time this was given:  150 mg on 8/15/2018  9:50 PM                                * Notice:  This list has 6 medication(s) that are the same as other medications prescribed for you. Read the directions carefully, and ask your doctor or other care provider to review them with you.

## 2018-08-15 NOTE — PROGRESS NOTES
Left Glenohumeral Joint Corticosteroid Injection     Diagnoses (preoperative and postoperative): Shoulder pain/glenohumeral joint osteoarthrosis  Current Procedure (include preoperative): Sonographically guided glenohumeral joint corticosteroid injection  Current Indication (include preoperative): Alleviation of pain  REFERRED BY: Dr. Meet Pan  REASON FOR PROCEDURE: Dr. Pan has requested a glenohumeral joint corticosteroid injection for alleviation of pain. Sonographic guidance will be used to ensure accurate placement of the medication within the joint space.  PATIENT EDUCATION: Ready to learn with no apparent learning barriers identified. Learning preferences include listening. Explained diagnosis and treatment plan as well as treatment alternatives. Patient expressed understanding of the content.  Following denial of allergy and review of potential side effects and complications including but not necessarily limited to infection, bleeding, allergic reaction, post-injection flare, local tissue breakdown (including but not limited to potential for skin depigmentation and/or subcutaneous fat atrophy), systemic effects of corticosteroids, elevation of blood glucose, injury to soft tissue and/or nerves and seizure, patient indicated their understanding and agreed to proceed.  PROCEDURE: Prior to the procedure, the posterior glenohumeral joint was examined with a 4 MHz curvilinear array transducer to visualize the joint space and determine the approach for the procedure.  Procedure was carried out using sterile technique including ChloroPrep, a sterile transducer cover, and a sterile transducer gel. A simple surgical tray was used.  PROCEDURAL PAUSE: Procedural pause conducted to verify correct patient identity, procedure to be performed, and as applicable, correct side/site, correct patient position, availability of implants, special equipment, or special requirements.  Patient position: Right lateral  recumbent  Transducer type: 4 MHz curvilinear array transducer  Approach: Lateral to medial parallel to long axis of transducer  Local Anesthesia: Sonographically guided 25-gauge 2-inch needle was directly visualized anesthetizing the skin and subcutaneous tissue with 5 ml's of 1% Lidocaine   Injectate:  Sonographically guided 22-gauge 3.5-inch needle was inserted to the humeral head with injectate noted to be flowing around the humeral head into the glenohumeral joint.  Solution of 1 ml of 40 mg/ml Depo Medrol (NDC - 3655-5708-88) and 4 ml's of 1% Lidocaine was injected without complication.  AFTERCARE:  Patient tolerated the procedure without complication. After a short observation period, patient was discharged under their own power and in excellent condition.    FOLLOW-UP:   1. Acetaminophen/ice as needed for improved pain control.  2. Instructed to watch blood sugars closely over the next 5-7 days as could be elevated with completion of procedure.  3. Follow-up in 2 weeks if no improvement of symptoms for further evaluation/medical care.  Consider a left subacromial bursa corticosteroid injection, further diagnostic imaging, formal physical therapy referral, etc. as deemed appropriate moving forward.    Sean Ramos DO, AURA    Department of Rehabilitation Medicine

## 2018-08-15 NOTE — LETTER
8/15/2018     RE: Mariel Ribeiro  [address] [place] [address]     Dear Colleague,    Thank you for referring your patient, Mariel Ribeiro, to the [facility] PHYSICAL MEDICINE AND REHABILITATION at [facility]. Please see a copy of my visit note below.    Left Glenohumeral Joint Corticosteroid Injection     Diagnoses (preoperative and postoperative): Shoulder pain/glenohumeral joint osteoarthrosis  Current Procedure (include preoperative): Sonographically guided glenohumeral joint corticosteroid injection  Current Indication (include preoperative): Alleviation of pain  REFERRED BY: Dr. Meet Pan  REASON FOR PROCEDURE: Dr. Pan has requested a glenohumeral joint corticosteroid injection for alleviation of pain. Sonographic guidance will be used to ensure accurate placement of the medication within the joint space.  PATIENT EDUCATION: Ready to learn with no apparent learning barriers identified. Learning preferences include listening. Explained diagnosis and treatment plan as well as treatment alternatives. Patient expressed understanding of the content.  Following denial of allergy and review of potential side effects and complications including but not necessarily limited to infection, bleeding, allergic reaction, post-injection flare, local tissue breakdown (including but not limited to potential for skin depigmentation and/or subcutaneous fat atrophy), systemic effects of corticosteroids, elevation of blood glucose, injury to soft tissue and/or nerves and seizure, patient indicated their understanding and agreed to proceed.  PROCEDURE: Prior to the procedure, the posterior glenohumeral joint was examined with a 4 MHz curvilinear array transducer to visualize the joint space and determine the approach for the procedure.  Procedure was carried out using sterile technique including ChloroPrep, a sterile transducer cover, and a sterile transducer gel. A simple surgical  tray was used.  PROCEDURAL PAUSE: Procedural pause conducted to verify correct patient identity, procedure to be performed, and as applicable, correct side/site, correct patient position, availability of implants, special equipment, or special requirements.  Patient position: Right lateral recumbent  Transducer type: 4 MHz curvilinear array transducer  Approach: Lateral to medial parallel to long axis of transducer  Local Anesthesia: Sonographically guided 25-gauge 2-inch needle was directly visualized anesthetizing the skin and subcutaneous tissue with 5 ml's of 1% Lidocaine   Injectate:  Sonographically guided 22-gauge 3.5-inch needle was inserted to the humeral head with injectate noted to be flowing around the humeral head into the glenohumeral joint.  Solution of 1 ml of 40 mg/ml Depo Medrol and 4 ml's of 1% Lidocaine was injected without complication.  AFTERCARE:  Patient tolerated the procedure without complication. After a short observation period, patient was discharged under their own power and in excellent condition.    FOLLOW-UP:   1. Acetaminophen/ice as needed for improved pain control.  2. Instructed to watch blood sugars closely over the next 5-7 days as could be elevated with completion of procedure.  3. Follow-up in 2 weeks if no improvement of symptoms for further evaluation/medical care.  Consider a left subacromial bursa corticosteroid injection, further diagnostic imaging, formal physical therapy referral, etc. as deemed appropriate moving forward.    Sean Ramos DO, GEORGINAM    Department of Rehabilitation Medicine

## 2018-08-15 NOTE — PROGRESS NOTES
"SPIRITUAL HEALTH SERVICES  SPIRITUAL ASSESSMENT Progress Note  [facility] 6B   ON-CALL VISIT    REFERRAL SOURCE: Hospital  Request     I met with Mariel Ribeiro to introduce myself and explain the role of spiritual health services. Mairel is at [facility] for \"heart monitoring\" as  \"things didn't look right\" when she was at her doctor's appointment today. Mariel had \"open heart surgery\" recently and is feeling \"quite anxious\" that this might be something \"really serious.\" Mariel reports she has gotten through things like this in her life in the past by \"trusting God.\" Mariel realizes that sometimes she just needs to be reminded of this. Mariel requested prayer which I provided as well as affirmation of spiritual strengths and words of peace.    PLAN: No follow up is anticipated at this time as Mariel is only planning on staying \"a couple days\" and feels her spiritual needs have been taken care of.    Rosina Menendez  Oncology   Pager [phone]    "

## 2018-08-15 NOTE — PATIENT INSTRUCTIONS
Cardiology Providers you saw during your visit:  Dr. Wolf    You will be admitted to the hospital for syncope.    Peter Khan RN  Cardiology Nurse Care Coordinator    Keep up the good work!    Take Care!

## 2018-08-15 NOTE — PLAN OF CARE
Transfer  Transferred to: 6C  Via: wheelchair  Reason for transfer: 6C overflow   Belongings: Packed and sent with pt  Chart: Delivered with pt to next unit  Medications: Meds sent to new unit with pt  Pt status: Pt AOx4, VSS, afebrile, on RA sats > 95%. Pt c/o lightheaded and dizziness upon admission with position changes, orthostatic BP negative. Pt denies lightheaded and dizziness this evening, up to bathroom with assist of 1 and cane. Groin and ralph area red and raw, miconazole powder ordered. Lymph edema wraps on, pt refused to remove wraps to assess skin until lymph edema can see pt in AM. ECHO and CXR completed. K+ replaced per protocol. CHO diet, 1.5L FR. NPO at midnight, plan for R heart cath tomorrow. Pt stable at time of transfer.

## 2018-08-16 ENCOUNTER — APPOINTMENT (OUTPATIENT)
Dept: OCCUPATIONAL THERAPY | Facility: CLINIC | Age: 73
DRG: 287 | End: 2018-08-16
Attending: INTERNAL MEDICINE
Payer: MEDICARE

## 2018-08-16 ENCOUNTER — APPOINTMENT (OUTPATIENT)
Dept: CARDIOLOGY | Facility: CLINIC | Age: 73
DRG: 287 | End: 2018-08-16
Attending: INTERNAL MEDICINE
Payer: MEDICARE

## 2018-08-16 VITALS
BODY MASS INDEX: 47.09 KG/M2 | OXYGEN SATURATION: 100 % | HEIGHT: 66 IN | DIASTOLIC BLOOD PRESSURE: 52 MMHG | RESPIRATION RATE: 18 BRPM | WEIGHT: 293 LBS | TEMPERATURE: 96.8 F | SYSTOLIC BLOOD PRESSURE: 130 MMHG

## 2018-08-16 PROBLEM — Z98.890 POSTOPERATIVE STATE: Status: RESOLVED | Noted: 2017-09-19 | Resolved: 2018-08-16

## 2018-08-16 PROBLEM — K76.0 FATTY LIVER DISEASE, NONALCOHOLIC: Chronic | Status: ACTIVE | Noted: 2017-11-15

## 2018-08-16 PROBLEM — K80.20 CHOLELITHIASIS: Chronic | Status: ACTIVE | Noted: 2017-05-17

## 2018-08-16 PROBLEM — I50.30 (HFPEF) HEART FAILURE WITH PRESERVED EJECTION FRACTION (H): Status: RESOLVED | Noted: 2018-07-28 | Resolved: 2018-08-16

## 2018-08-16 PROBLEM — K76.0 FATTY LIVER DISEASE, NONALCOHOLIC: Status: ACTIVE | Noted: 2017-11-15

## 2018-08-16 LAB
ANION GAP SERPL CALCULATED.3IONS-SCNC: 9 MMOL/L (ref 3–14)
BASOPHILS # BLD AUTO: 0 10E9/L (ref 0–0.2)
BASOPHILS NFR BLD AUTO: 0.6 %
BUN SERPL-MCNC: 34 MG/DL (ref 7–30)
CALCIUM SERPL-MCNC: 9.3 MG/DL (ref 8.5–10.1)
CHLORIDE SERPL-SCNC: 105 MMOL/L (ref 94–109)
CO2 SERPL-SCNC: 30 MMOL/L (ref 20–32)
CREAT SERPL-MCNC: 1.5 MG/DL (ref 0.52–1.04)
DIFFERENTIAL METHOD BLD: ABNORMAL
EOSINOPHIL # BLD AUTO: 0.1 10E9/L (ref 0–0.7)
EOSINOPHIL NFR BLD AUTO: 2.5 %
ERYTHROCYTE [DISTWIDTH] IN BLOOD BY AUTOMATED COUNT: 14 % (ref 10–15)
GFR SERPL CREATININE-BSD FRML MDRD: 34 ML/MIN/1.7M2
GLUCOSE BLDC GLUCOMTR-MCNC: 101 MG/DL (ref 70–99)
GLUCOSE BLDC GLUCOMTR-MCNC: 132 MG/DL (ref 70–99)
GLUCOSE BLDC GLUCOMTR-MCNC: 95 MG/DL (ref 70–99)
GLUCOSE BLDC GLUCOMTR-MCNC: 98 MG/DL (ref 70–99)
GLUCOSE SERPL-MCNC: 120 MG/DL (ref 70–99)
HCT VFR BLD AUTO: 33.3 % (ref 35–47)
HGB BLD-MCNC: 9.7 G/DL (ref 11.7–15.7)
IMM GRANULOCYTES # BLD: 0 10E9/L (ref 0–0.4)
IMM GRANULOCYTES NFR BLD: 0.2 %
INTERPRETATION ECG - MUSE: NORMAL
LYMPHOCYTES # BLD AUTO: 1.2 10E9/L (ref 0.8–5.3)
LYMPHOCYTES NFR BLD AUTO: 23.7 %
MAGNESIUM SERPL-MCNC: 1.7 MG/DL (ref 1.6–2.3)
MCH RBC QN AUTO: 24.7 PG (ref 26.5–33)
MCHC RBC AUTO-ENTMCNC: 29.1 G/DL (ref 31.5–36.5)
MCV RBC AUTO: 85 FL (ref 78–100)
MONOCYTES # BLD AUTO: 0.6 10E9/L (ref 0–1.3)
MONOCYTES NFR BLD AUTO: 12 %
NEUTROPHILS # BLD AUTO: 3.1 10E9/L (ref 1.6–8.3)
NEUTROPHILS NFR BLD AUTO: 61 %
NRBC # BLD AUTO: 0 10*3/UL
NRBC BLD AUTO-RTO: 0 /100
PLATELET # BLD AUTO: 136 10E9/L (ref 150–450)
POTASSIUM SERPL-SCNC: 3.1 MMOL/L (ref 3.4–5.3)
RBC # BLD AUTO: 3.93 10E12/L (ref 3.8–5.2)
SODIUM SERPL-SCNC: 144 MMOL/L (ref 133–144)
WBC # BLD AUTO: 5.2 10E9/L (ref 4–11)

## 2018-08-16 PROCEDURE — 27210982 ZZH KIT RT HC TOTES DISP CR7

## 2018-08-16 PROCEDURE — A9270 NON-COVERED ITEM OR SERVICE: HCPCS | Mod: GY | Performed by: STUDENT IN AN ORGANIZED HEALTH CARE EDUCATION/TRAINING PROGRAM

## 2018-08-16 PROCEDURE — 40000895 ZZH STATISTIC SLP IP EVAL DEFER

## 2018-08-16 PROCEDURE — 27210787 ZZH MANIFOLD CR2

## 2018-08-16 PROCEDURE — A9270 NON-COVERED ITEM OR SERVICE: HCPCS | Mod: GY | Performed by: INTERNAL MEDICINE

## 2018-08-16 PROCEDURE — 93451 RIGHT HEART CATH: CPT | Mod: 26 | Performed by: INTERNAL MEDICINE

## 2018-08-16 PROCEDURE — 85025 COMPLETE CBC W/AUTO DIFF WBC: CPT | Performed by: STUDENT IN AN ORGANIZED HEALTH CARE EDUCATION/TRAINING PROGRAM

## 2018-08-16 PROCEDURE — 25000132 ZZH RX MED GY IP 250 OP 250 PS 637: Mod: GY | Performed by: INTERNAL MEDICINE

## 2018-08-16 PROCEDURE — 97165 OT EVAL LOW COMPLEX 30 MIN: CPT | Mod: GO | Performed by: OCCUPATIONAL THERAPIST

## 2018-08-16 PROCEDURE — 99238 HOSP IP/OBS DSCHRG MGMT 30/<: CPT | Mod: GC | Performed by: PEDIATRICS

## 2018-08-16 PROCEDURE — 25000128 H RX IP 250 OP 636: Performed by: STUDENT IN AN ORGANIZED HEALTH CARE EDUCATION/TRAINING PROGRAM

## 2018-08-16 PROCEDURE — 83735 ASSAY OF MAGNESIUM: CPT | Performed by: STUDENT IN AN ORGANIZED HEALTH CARE EDUCATION/TRAINING PROGRAM

## 2018-08-16 PROCEDURE — 00000146 ZZHCL STATISTIC GLUCOSE BY METER IP

## 2018-08-16 PROCEDURE — 36415 COLL VENOUS BLD VENIPUNCTURE: CPT | Performed by: STUDENT IN AN ORGANIZED HEALTH CARE EDUCATION/TRAINING PROGRAM

## 2018-08-16 PROCEDURE — 27211181 ZZH BALLOON TIP PRESSURE CR5

## 2018-08-16 PROCEDURE — 97140 MANUAL THERAPY 1/> REGIONS: CPT | Mod: GO | Performed by: OCCUPATIONAL THERAPIST

## 2018-08-16 PROCEDURE — 93451 RIGHT HEART CATH: CPT

## 2018-08-16 PROCEDURE — 80048 BASIC METABOLIC PNL TOTAL CA: CPT | Performed by: STUDENT IN AN ORGANIZED HEALTH CARE EDUCATION/TRAINING PROGRAM

## 2018-08-16 PROCEDURE — 25000125 ZZHC RX 250: Performed by: STUDENT IN AN ORGANIZED HEALTH CARE EDUCATION/TRAINING PROGRAM

## 2018-08-16 PROCEDURE — 40000133 ZZH STATISTIC OT WARD VISIT: Performed by: OCCUPATIONAL THERAPIST

## 2018-08-16 PROCEDURE — 25000132 ZZH RX MED GY IP 250 OP 250 PS 637: Mod: GY | Performed by: STUDENT IN AN ORGANIZED HEALTH CARE EDUCATION/TRAINING PROGRAM

## 2018-08-16 RX ORDER — BUMETANIDE 2 MG/1
2 TABLET ORAL
Status: DISCONTINUED | OUTPATIENT
Start: 2018-08-16 | End: 2018-08-16

## 2018-08-16 RX ORDER — BUMETANIDE 1 MG/1
1 TABLET ORAL EVERY EVENING
Qty: 30 TABLET | Refills: 1 | Status: SHIPPED | OUTPATIENT
Start: 2018-08-17 | End: 2018-08-28

## 2018-08-16 RX ORDER — BUMETANIDE 2 MG/1
2 TABLET ORAL EVERY MORNING
Status: DISCONTINUED | OUTPATIENT
Start: 2018-08-17 | End: 2018-08-16 | Stop reason: HOSPADM

## 2018-08-16 RX ORDER — CLOTRIMAZOLE 1 %
CREAM (GRAM) TOPICAL 2 TIMES DAILY
Qty: 60 G | Refills: 3 | Status: SHIPPED | OUTPATIENT
Start: 2018-08-16 | End: 2019-04-10

## 2018-08-16 RX ORDER — BUMETANIDE 1 MG/1
1 TABLET ORAL EVERY EVENING
Status: DISCONTINUED | OUTPATIENT
Start: 2018-08-17 | End: 2018-08-16 | Stop reason: HOSPADM

## 2018-08-16 RX ORDER — LIDOCAINE HYDROCHLORIDE 10 MG/ML
INJECTION, SOLUTION EPIDURAL; INFILTRATION; INTRACAUDAL; PERINEURAL
Status: DISCONTINUED
Start: 2018-08-16 | End: 2018-08-16 | Stop reason: HOSPADM

## 2018-08-16 RX ORDER — BUMETANIDE 2 MG/1
2 TABLET ORAL EVERY MORNING
Qty: 30 TABLET | Refills: 1 | Status: SHIPPED | OUTPATIENT
Start: 2018-08-17 | End: 2018-08-28

## 2018-08-16 RX ORDER — LISINOPRIL 5 MG/1
5 TABLET ORAL DAILY
Status: DISCONTINUED | OUTPATIENT
Start: 2018-08-16 | End: 2018-08-16

## 2018-08-16 RX ORDER — LOSARTAN POTASSIUM 25 MG/1
12.5 TABLET ORAL DAILY
Qty: 15 TABLET | Refills: 1 | Status: SHIPPED | OUTPATIENT
Start: 2018-08-16 | End: 2018-08-28

## 2018-08-16 RX ORDER — FERROUS GLUCONATE 324(38)MG
324 TABLET ORAL
Qty: 12 TABLET | Refills: 2 | Status: SHIPPED | OUTPATIENT
Start: 2018-08-17 | End: 2018-09-12

## 2018-08-16 RX ADMIN — Medication 2 G: at 17:16

## 2018-08-16 RX ADMIN — POTASSIUM CHLORIDE 40 MEQ: 750 TABLET, EXTENDED RELEASE ORAL at 08:50

## 2018-08-16 RX ADMIN — LEVOTHYROXINE SODIUM 150 MCG: 150 TABLET ORAL at 08:46

## 2018-08-16 RX ADMIN — Medication 30 G: at 17:49

## 2018-08-16 RX ADMIN — IRON SUCROSE 200 MG: 20 INJECTION, SOLUTION INTRAVENOUS at 09:04

## 2018-08-16 RX ADMIN — Medication 30 G: at 09:04

## 2018-08-16 RX ADMIN — ATORVASTATIN CALCIUM 40 MG: 40 TABLET, FILM COATED ORAL at 08:46

## 2018-08-16 RX ADMIN — METOPROLOL TARTRATE 75 MG: 25 TABLET, FILM COATED ORAL at 08:48

## 2018-08-16 RX ADMIN — ASPIRIN 325 MG: 325 TABLET, DELAYED RELEASE ORAL at 08:49

## 2018-08-16 RX ADMIN — MICONAZOLE NITRATE: 2 POWDER TOPICAL at 09:01

## 2018-08-16 RX ADMIN — Medication 30 G: at 14:05

## 2018-08-16 RX ADMIN — ACETAMINOPHEN 800 MCG: 400 TABLET ORAL at 08:47

## 2018-08-16 RX ADMIN — ESCITALOPRAM OXALATE 20 MG: 10 TABLET ORAL at 08:47

## 2018-08-16 RX ADMIN — ACETAMINOPHEN 650 MG: 325 TABLET, FILM COATED ORAL at 16:48

## 2018-08-16 RX ADMIN — PREGABALIN 100 MG: 100 CAPSULE ORAL at 08:46

## 2018-08-16 RX ADMIN — CHOLECALCIFEROL CAP 125 MCG (5000 UNIT) 10000 UNITS: 125 CAP at 08:47

## 2018-08-16 RX ADMIN — CLOTRIMAZOLE: 10 CREAM TOPICAL at 09:01

## 2018-08-16 RX ADMIN — Medication 500 MG: at 08:47

## 2018-08-16 RX ADMIN — Medication 12.5 MG: at 16:48

## 2018-08-16 RX ADMIN — POTASSIUM CHLORIDE 20 MEQ: 750 TABLET, EXTENDED RELEASE ORAL at 17:15

## 2018-08-16 ASSESSMENT — ACTIVITIES OF DAILY LIVING (ADL)
ADLS_ACUITY_SCORE: 16
ADLS_ACUITY_SCORE: 16
ADLS_ACUITY_SCORE: 15
ADLS_ACUITY_SCORE: 16

## 2018-08-16 NOTE — PLAN OF CARE
Problem: Patient Care Overview  Goal: Plan of Care/Patient Progress Review  Edema 6C: Initial edema evaluation completed. Patient with chronic LE edema bilaterally, skin intact with firm 1+/2+ pitting edema throughout. Healed scabs on medial knees bilaterally from previous surgeries. Application of GCB from MTPs to knee creases for further management of LE edema. Remove wraps if causing pain/numbness or become soiled. Recommend continued home health for lymphedema wrapping.

## 2018-08-16 NOTE — PROCEDURES
CARDIAC CATH REPORT:     PROCEDURES PERFORMED:   Right Heart Catheterization    PHYSICIANS:  Attending Physician: Nikko Mcallister MD  Cardiology Fellow: José Colbert MD    INDICATION:  Mariel Ribeiro is a 73 year old female with history significant for NICM, DM, COPD, and CAD (2v CAB) presenting to cath lab for urgent hemodynamic assessment for the indication of syncope.    DESCRIPTION:  1. Consent obtained with discussion of risks.  All questions were answered.  2. Sterile prep and procedure.  3. Location with Sheaths: RIJ 7 Fr  4. Access: Local anesthetic with lidocaine.  A standard 18 guage needle with ultrasound guidance was used to establish vascular access using a modified Seldinger technique.  5. Diagnostic Catheters:   7 Fr  Chester Bisi  6. Guiding Catheters:  None  7. Estimated blood loss: < 25 ml    MEDICATIONS:  No sedation was given.  Heart rate, BP, respiration, oxygen saturation and patient responses were monitored throughout the procedure with the assistance of the RN under my supervision.    HEMODYNAMICS:  HR 58  /86/123  RA 13  RV 44/13  PA 44/25/33  PA sat 51.8%  PCW 18  PCW sat 93.5%  Hgb 9.7 g/dL  Juanita CO (CI): 3.6 (1.5)  PVR 4.2  SVR 2444    Sheath Removal:  - The RIJ sheath was removed in the cath lab.    Fluoroscopy Time: 0.1 min    COMPLICATIONS:  1. None    SUMMARY:   >> elevated biventricular filling pressures  >> low cardiac output by Juanita  >> severely elevated SVR  >> mild post-capillary pulmonary hypertension  >> systemic hypertension    PLAN:   >> return to inpatient team  >> consider further afterload reduction with goal SVR 2166-4313    The attending interventional cardiologist was present and supervised all critical aspects the procedure.    Findings discussed with Cards1 service and patient.    See CVIS report for final draft.    José Colbert MD   Cardiology Fellow    German Mcallister MD   Cardiology Staff

## 2018-08-16 NOTE — PLAN OF CARE
Problem: Patient Care Overview  Goal: Plan of Care/Patient Progress Review  SLP: Deferred bedside swallow evaluation today and requested an order for a FEES (fiberoptic endoscopic evaluation of swallow) to be completed tomorrow given pt's hx of dysphagia and possible vocal cord dysfunction post procedure and intubation in July 2018. Discussed procedure with pt and friend, they are agreeable to completing the swallow assessment. Will schedule this for 8/17.

## 2018-08-16 NOTE — DISCHARGE SUMMARY
[facility]   Cardiology Discharge Summary      Date of Admission:  8/15/2018  Date of Discharge:  8/16/2018   Discharging Provider: Rafi Huizar  Date of Service: 8/16/2018     Primary Care     Rafaela William  [address]  [place] [address]      Identification and Chief Compaint: Mariel Ribeiro is a 73 year old female who presented on 8/15/2018 with complaint of worsening TOWNSEND and syncopal episode during cardiology clinic visit.    Discharge Diagnoses       Syncope    Coronary atherosclerosis    Chronic diastolic heart failure (H)    Hypothyroidism    Chronic airway obstruction/ COPD    Obstructive sleep apnea    Hypertension goal BP (blood pressure) < 130/80    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)    Hyperlipidemia with target LDL less than 70    Hypokalemia    Fatty liver disease, nonalcoholic    S/P CABG x 3    Type 2 diabetes mellitus with stage 3 chronic kidney disease, with long-term current use of insulin (H)    (HFpEF) heart failure with preserved ejection fraction (H)      Discharge Disposition   Discharged to home    Discharge Orders     Home care nursing referral     Medication Therapy Management Referral       Discharge Medications   Current Discharge Medication List      CONTINUE these medications which have NOT CHANGED    Details   acetaminophen (TYLENOL) 325 MG tablet Take 2 tablets (650 mg) by mouth every 4 hours as needed for mild pain  Qty: 100 tablet    Associated Diagnoses: S/P CABG x 3; Acute post-operative pain      albuterol (PROAIR HFA) 108 (90 Base) MCG/ACT Inhaler Inhale 1-2 puffs into the lungs every 4 hours as needed for wheezing  Qty: 1 Inhaler, Refills: 0    Associated Diagnoses: Chronic obstructive pulmonary disease, unspecified COPD type (H)      aspirin  MG tablet Take 1 tablet by mouth daily.  Qty: 30 tablet, Refills: 0      atorvastatin (LIPITOR) 40 MG tablet Take 1 tablet (40 mg) by mouth daily  Qty: 30 tablet,  Refills: 0    Associated Diagnoses: Atherosclerosis of native coronary artery without angina pectoris, unspecified whether native or transplanted heart      bumetanide (BUMEX) 2 MG tablet Take 1 tablet (2 mg) by mouth 2 times daily  Qty: 180 tablet, Refills: 3    Associated Diagnoses: Atherosclerosis of native coronary artery without angina pectoris, unspecified whether native or transplanted heart      cholecalciferol 49689 units CAPS Take 10,000 Units by mouth daily  Qty: 30 capsule, Refills: 0    Associated Diagnoses: Type 2 diabetes mellitus with diabetic chronic kidney disease, unspecified CKD stage, unspecified long term insulin use status (H)      clotrimazole (LOTRIMIN) 1 % cream Apply topically 2 times daily  Qty: 30 g, Refills: 1    Associated Diagnoses: Candidiasis of vulva and vagina      escitalopram (LEXAPRO) 20 MG tablet Take 1 tablet (20 mg) by mouth every morning  Qty: 30 tablet, Refills: 0    Associated Diagnoses: Recurrent major depressive disorder, in partial remission (H)      febuxostat (ULORIC) 40 MG TABS tablet Take 1 tablet (40 mg) by mouth every evening  Qty: 30 tablet, Refills: 0    Associated Diagnoses: Vitamin D deficiency      folic acid 800 MCG TABS Take 800 mcg by mouth daily  Qty: 30 tablet, Refills: 0    Associated Diagnoses: Atherosclerosis of native coronary artery without angina pectoris, unspecified whether native or transplanted heart      guaiFENesin (MUCINEX) 600 MG 12 hr tablet Take 1 tablet (600 mg) by mouth 2 times daily      hydrALAZINE (APRESOLINE) 25 MG tablet Take 3 tablets (75 mg) by mouth 3 times daily  Qty: 270 tablet, Refills: 3    Associated Diagnoses: Atherosclerosis of native coronary artery without angina pectoris, unspecified whether native or transplanted heart      !! insulin isophane human (HUMULIN N PEN) 100 UNIT/ML injection Inject 35 Units Subcutaneous every 24 hours  Qty: 10.5 mL, Refills: 0    Comments: Take at 8 am  Associated Diagnoses: Type 2  diabetes mellitus with diabetic chronic kidney disease, unspecified CKD stage, unspecified long term insulin use status (H)      !! insulin isophane human (HUMULIN N PEN) 100 UNIT/ML injection Inject 35 Units Subcutaneous daily (with dinner)  Qty: 10.5 mL, Refills: 0    Associated Diagnoses: Type 2 diabetes mellitus with diabetic chronic kidney disease, unspecified CKD stage, unspecified long term insulin use status (H)      ketamine, KETALAR, 5%-gabapentin, NEURONTIN, 8% 5%-8% GEL topical PLO cream Apply 30 g topically 3 times daily  Qty: 30 g, Refills: 0      levothyroxine (SYNTHROID/LEVOTHROID) 150 MCG tablet Take 1 tablet (150 mcg) by mouth daily  Qty: 30 tablet, Refills: 0    Associated Diagnoses: Other specified hypothyroidism      Metoprolol Tartrate 75 MG TABS Take 75 mg by mouth 2 times daily  Qty: 180 tablet, Refills: 3    Associated Diagnoses: Atherosclerosis of native coronary artery without angina pectoris, unspecified whether native or transplanted heart      niacin (SLO-NIACIN) 500 MG CR tablet Take 1 tablet (500 mg) by mouth 2 times daily  Qty: 60 tablet, Refills: 0    Associated Diagnoses: Atherosclerosis of native coronary artery without angina pectoris, unspecified whether native or transplanted heart      potassium chloride SA (K-DUR/KLOR-CON M) 20 MEQ CR tablet Take 2 tablets (40 mEq) by mouth daily  Qty: 60 tablet, Refills: 0    Associated Diagnoses: Atherosclerosis of native coronary artery without angina pectoris, unspecified whether native or transplanted heart      pregabalin (LYRICA) 100 MG capsule Take 1 capsule (100 mg) by mouth 2 times daily  Qty: 90 capsule, Refills: 0    Associated Diagnoses: Pain in joint of left shoulder      repaglinide (PRANDIN) 1 MG tablet Take 1 tablet (1 mg) by mouth 3 times daily (before meals)  Qty: 90 tablet, Refills: 3    Associated Diagnoses: Type 2 diabetes mellitus with diabetic chronic kidney disease, unspecified CKD stage, unspecified long term insulin  use status (H)      senna-docusate (SENOKOT-S;PERICOLACE) 8.6-50 MG per tablet Take 1-2 tablets by mouth 2 times daily as needed for constipation  Qty: 30 tablet, Refills: 0    Associated Diagnoses: Atherosclerosis of native coronary artery without angina pectoris, unspecified whether native or transplanted heart      TIZANIDINE HCL PO Take 4 mg by mouth At Bedtime       traZODone (DESYREL) 50 MG tablet Take 3 tablets (150 mg) by mouth At Bedtime  Qty: 270 tablet, Refills: 1    Associated Diagnoses: Recurrent major depressive disorder, in partial remission (H)      B-D U/F insulin pen needle USE FOR INSULIN INJECTION  Qty: 100 each, Refills: 0    Associated Diagnoses: Type 2 diabetes mellitus with diabetic polyneuropathy, without long-term current use of insulin (H)      blood glucose monitoring (NO BRAND SPECIFIED) test strip 1 strip by In Vitro route 2 times daily Strips per patient's preference  Qty: 200 each, Refills: 3    Associated Diagnoses: Type 2 diabetes mellitus with diabetic polyneuropathy, without long-term current use of insulin (H)      EPINEPHrine (EPIPEN/ADRENACLICK/OR ANY BX GENERIC EQUIV) 0.3 MG/0.3ML injection 2-pack Inject 0.3 mLs (0.3 mg) into the muscle once as needed for anaphylaxis  Qty: 0.6 mL, Refills: 0    Associated Diagnoses: Allergic reaction, subsequent encounter      insulin syringes, disposable, U-100 1 ML MISC 1 each 5 times daily  Qty: 100 each, Refills: 11    Associated Diagnoses: Type 2 diabetes mellitus with chronic kidney disease, without long-term current use of insulin, unspecified CKD stage (H)      Proper ClothCAN FINEPOINT LANCETS MISC Use to test blood sugars 2 times daily or as directed.  Qty: 100 each, Refills: prn    Associated Diagnoses: Type 2 diabetes mellitus with diabetic polyneuropathy, without long-term current use of insulin (H)      nitroGLYcerin (NITROSTAT) 0.4 MG sublingual tablet For chest pain place 1 tablet under the tongue every 5 minutes for 3 doses. If  symptoms persist 5 minutes after 1st dose call 911.  Qty: 25 tablet, Refills: 0    Associated Diagnoses: Atherosclerosis of native coronary artery without angina pectoris, unspecified whether native or transplanted heart      ONE TOUCH ULTRA (DEVICES) MISC test blood sugar BID  Qty: 1, Refills: 0    Associated Diagnoses: Type II or unspecified type diabetes mellitus without mention of complication, not stated as uncontrolled      polyethylene glycol (MIRALAX/GLYCOLAX) Packet Take 17 g by mouth daily  Qty: 7 packet, Refills: 0    Associated Diagnoses: Atherosclerosis of native coronary artery without angina pectoris, unspecified whether native or transplanted heart       !! - Potential duplicate medications found. Please discuss with provider.      STOP taking these medications       lidocaine 1 % injection Comments:   Reason for Stopping:         methylPREDNISolone acetate (DEPO-MEDROL) 40 MG/ML injection Comments:   Reason for Stopping:             Allergies   Allergies   Allergen Reactions     Contrast Dye Anaphylaxis     Fish Allergy Anaphylaxis     Iodine Anaphylaxis     Oxycodone Other (See Comments)     Severe suicidal tendencies on this medication     Tree Nuts [Nuts] Anaphylaxis     Tree nuts only (peanuts ok)     Albuterol Other (See Comments)     Sandrita-oral erythema  Confirmed 7/3/18 that patient uses albuterol inhalers at home. Patient had her home inhaler in her bag.     Bactrim      Increased uric acid     Betadine [Povidone Iodine] Hives, Swelling and Difficulty breathing     Betadine     Combivent      Rash     Dulaglutide Other (See Comments)     Hx . Of thyroid cancer.     Lisinopril Other (See Comments)     Scr/grf severely reduced.      Penicillins Rash     Allopurinol Rash     Latex Rash     Wool Fiber Rash       Consultations This Hospital Stay    PHYSICAL THERAPY ADULT IP CONSULT  LYMPHEDEMA THERAPY IP CONSULT  VASCULAR ACCESS CARE ADULT IP CONSULT  SWALLOW EVAL SPEECH PATH AT BEDSIDE IP  CONSULT  SOCIAL WORK IP CONSULT  INTERNAL MEDICINE PROCEDURE TEAM ADULT IP CONSULT [place] - DIALYSIS NON TUNNELED CENTRAL LINE PLACEMENT  SLP FEES FIBEROPTIC ENDOSCOPIC SWALLOW IP CONSULT    Significant Results and Procedures   Procedures  Right heart catheterization  Left pleural effusion thoracocentesis     Data   Results for orders placed or performed during the hospital encounter of 08/15/18   XR Chest Port 1 View    Narrative    Exam: XR CHEST PORT 1 VW, 8/15/2018 2:21 PM    Indication: COPD vs heart failure, eval interval changes, 6 wks post  CABG;     Comparison: Chest x-ray 7/8/2018    Findings:   Portable AP radiograph of the chest at 30 degrees. Surgical changes of  CABG, including median sternotomy wires and mediastinal surgical  clips. The cardiac silhouette is obscured. Moderate to large left  pleural effusion, increased compared to the prior radiograph on  7/8/2018. No pneumothorax. Dense left retrocardiac opacities. The  visualized upper abdomen appears unremarkable.      Impression    Impression:   Moderate to large left pleural effusion with overlying atelectasis  and/or consolidation, which has greatly increased since the prior  radiograph on 7/8/2018.    I have personally reviewed the examination and initial interpretation  and I agree with the findings.    SOPHIE GUADARRAMA MD     *Note: Due to a large number of results and/or encounters for the requested time period, some results have not been displayed. A complete set of results can be found in Results Review.       History of Present Illness   Mariel Ribeiro is a 73 year old female with medical history significant for T2DM, COPD, CAD s/p  2x LAD stenting in 2011 and 3x CABG 7/2/18, CHF w/ EF 55-60% stage III CKD, HLD, fibromyalgia, lymphedema, hypothyroidism who is a direct admit from cardiology clinic at the direction of Dr. Wolf for syncopal episode in clinic and worsening SOB and dizziness on exertion.      Patient recently admitted for  3x CABG of LAD, rPDA and OM2 7/2/18 and was subsequently discharged from rehab 7/23. Patient does reports chest pain from her sternotomy site however states this is different from the chest tightness experienced on exertion since her surgery. Patient reports she last experienced chest tightness on 8/13 and denies any today. Patient reports chronic TOWNSEND and that it is worse after her 3x CABG than before. Patient reports that she last went for a walk on 8/11 with her baseline TOWNSEND but has since worsened. States she has been wheelchair bound since and experiences dizziness and SOB when sitting up or transferring in and out of wheelchair. Patient does complains of headache today due to not eating, denies taking an AM glucose. Reports dysphagia and a cough that was worsened since her surgery 7/2 and is followed by SLP. Patient denies productive cough. Denies acute changes in urination or stool production. Denies n/f/v/c. Roommate reports that since her surgery she has episodes of being less responsive, but does not feel these are LOC.      Today patient did not have anything to eat for lab draws and did not take any of her medications. Patient experienced LOC during clinic visit with Dr. Wolf during ultrasound measurement of JVP. Patient is unsure how long she was unconscious for.      Patient reports she lives with her friend. States she quit smoking >40 years ago and denies alcohol use.      Recent admission(s)   6/29/18-7/13/18 admit from ED for 3xCABG  7/13-7/23 in rehabilitation facility     Hospital Course   Mariel Ribeiro was admitted on 8/15/2018.  The following problems were addressed during her hospitalization:    # Dyspnea on exertion  Could be a result of progressive COPD, decompensated heart failure 2/2, obesity hypoventilation syndrome, excessive dynamic airway collapse, left pleural effusion. NT-BNP 8/15 is elevated at 1149, but has also been elevated at ~950 in that past. Echo 8/15 shows an EF of 40-45%  reduced from 55-60% (7/20) however the IVC remains dilated with diameter of 2.6cm with both studies. Patient was diuresed 8/15 with 80mg of lasix and 500mg diuril. 8/16 patient had right heart cath with PCW 18, RA 13, PA 33, RV 15, CI 1.53, SVR 2444, /86. Patient thought to be nervous during catheterization and reason why elevated pressures. Patient at baseline weight and is not thought to be volume overloaded, however patient did report some improvement in SOB after diuresis.   - consider out patient PFTs  - consider out patient expiratory CT scan to exclude EDAC  - discharge with bumex 1mg in AM and 2mg in PM     # Syncope  Could be secondary to vaso vagal, orthostatic hypotension, hypoglycemia, arrhythmia. Less likely arrhythmia due to normal EKG today and previously without known diagnosis of arrhythmia. Possibly to be hypoglycemia due to patient fasting for lab draw today. Cannot exclude orthostatic hypotension. Possibly vaso vagal response due to concurrent JVP testing at time of syncopal episode. Patient did not experience further syncopal episodes during admission and telemetry did not show any significant events.   - consider tilt table testing as out patient      # Left pleural effusion  Pleural effusion on cxr 8/15 has increased in size since last cxr 7/8. Trivial pericardial effusion observed on 8/15 echo. Could represent late onset effusion 2/2 to surgical trauma. Less likely to be postpericardiotomy syndrome as patient does not have signs of pericarditis at present time. More likely to be a result of CHF. Patient underwent thoracocentesis 8/16 however CAPS team was unable to tap effusion and recommended possible IR thoracocentesis.   - recommend follow up with PCP to monitor      # HTN  # CAD s/p 2x LAD stent 2011 and CABG 7/2/18  # HLD  Blood pressure stable during admission with hydralazine held.   - continue PTA aspirin 325 daily   - continue PTA atorvastatin 40mg daily   - continue PTA  metoprolol 75mg BID  - discontinue PTA hydralazine 75mg TID  - discharge with bumex 1mg in AM and 2mg in PM      # Normocytic anemia, chronic   Hemoglobin 9.7 upon admission. Base line ~12. Patient treatment c/b hematoma in right leg. Last received transfusion 7/25. Patient received 2 doses of  IV iron sucrose 200U during admission.    - discharge with PO ferric gluconate tablets   - trend CBC daily       Pending Results   Unresulted Labs Ordered in the Past 30 Days of this Admission     No orders found for last 61 day(s).          Physical Exam   Temp:  [96.8  F (36  C)-98.2  F (36.8  C)] 96.8  F (36  C)  Heart Rate:  [54-85] 60  Resp:  [18-20] 18  BP: (115-161)/(55-77) 128/60  SpO2:  [89 %-100 %] 100 %  Vitals:    08/15/18 1236 08/15/18 1800 08/16/18 0135   Weight: 136.7 kg (301 lb 6.4 oz) 137.7 kg (303 lb 9.6 oz) 135.5 kg (298 lb 11.2 oz)       # Discharge Pain Plan:   - Patient currently has NO PAIN and is not being prescribed pain medications on discharge.    General: AAOx3, not in acute distress  HEENT:  PERRL, EOMI, MMM with out pharyngeal erythema. 8-10cm JVD  Cardiac: RRR. No rubs, murmur, gallop. DP2+ bilaterally, +1 in legs   Pulm: CTAB, negative wheezes,  negative crackles.  Abd: +BS, soft, obese, non tender. No hepatosplenomegaly. obese  Skin: No rash  MSK: No deformities, no joint swelling, no clubbing/cyanosis, no edema, grossly edematous lower extremities with 1+ pitting edema   Neuro: CN grossly intact, no focal deficits  Psych: normal mood, full affect    The discharge plan was discussed with the patient and Dr. Crystal HUTSON.     Rafi Huizar  PGY 1   August 16, 2018  10:00pm

## 2018-08-16 NOTE — PROGRESS NOTES
[facility]  Patient is currently open to home care services with [facility].  The patient is currently receiving RN/HA/OT/PT/SLP     services.  [facility]  and team have been notified of patient admission.  [facility] liaison will continue to follow patient during stay.  If appropriate provide orders to resume home care at time of discharge.    Thank you  Fabiola Blas RN, BSN  [facility] Liaison  [facility]  [phone]

## 2018-08-16 NOTE — PROGRESS NOTES
Cardiology Progress Note  Mariel Ribeiro MRN: [id number]  Age: 73 year old, : 1945  Date: 2018       Changes today     - right heart cath today   - left pleural effusion thoracocentesis  - speech and swallow consult  - hold diuretics   - cont iron sucrose 200U daily   - continue slow diuresis   - bumex 1mg in AM and 2mg in PM      Assessments and Plans     Mariel Ribeiro is a 73 year old female with medical history significant for T2DM, COPD, CAD s/p  2x LAD stenting in  and 3x CABG 18, CHF w/ EF 55-60% stage III CKD, HLD, fibromyalgia, lymphedema, hypothyroidism who is a direct admit from cardiology clinic at the direction of Dr. Wolf for syncopal episode in clinic and worsening SOB and dizziness on exertion.    # Dyspnea on exertion  Could be a result of progressive COPD, decompensated heart failure 2, obesity hypoventilation syndrome, excessive dynamic airway collapse, left pleural effusion. NT-BNP 8/15 is elevated at 1149, but has also been elevated at ~950 in that past. Echo 8/15 shows an EF of 40-45% reduced from 55-60% () however the IVC remains dilated with diameter of 2.6cm with both studies  - right heart cath : PCW 18, RA 13, PA 33, RV 15, CI 1.53  - consider out patient PFTs  - consider thoracocentesis to evaluate left pleural effusion, however in setting of recent CABG may be contraindicated   - may need expiratory CT scan to exclude EDAC  - cont bumex 1mg in AM and 2mg in PM    # Syncope  Could be secondary to vaso vagal, orthostatic hypotension, hypoglycemia, arrhythmia. Less likely arrhythmia due to normal EKG today and previously without known diagnosis of arrhythmia. Possibly to be hypoglycemia due to patient fasting for lab draw today. Cannot exclude orthostatic hypotension. Possibly vaso vagal response due to concurrent JVP testing at time of syncopal episode.  - orthostatic blood pressures  - consider tilt table testing  -  telemetry while in patient     # Left pleural effusion   Pleural effusion on cxr 8/15 has increased in size since last cxr 7/8. Trivial pericardial effusion observed on 8/15 echo. Could represent late onset effusion 2/2 to surgical trauma. Less likely to be postpericardiotomy syndrome as patient does not have signs of pericarditis at present time. More likely to be a result of CHF.   - consider thoracocentesis of left pleural effusion pending results of right heart cath     # HTN  # CAD s/p 2x LAD stent 2011 and CABG 7/2/18  - continue PTA aspirin 325 daily   - continue PTA atorvastatin 40mg daily   - continue PTA metoprolol 75mg BID  - hold PTA hydralazine 75mg TID  - hold PTA bumex 2mg BID     # Headache  - Acetaminophen 650mg q4h prn pain      # Normocytic anemia, chronic   Hemoglobin 9.7 upon admission. Base line ~12. Patient treatment c/b hematoma in right leg. Last received transfusion 7/25.   - cont iron sucrose 200U daily    - trend CBC daily     # Hypokalemia  - potassium supplementation protocol prn   - trend BMP     Chronic problems  # T2DM  - high intensity sliding scale  # Hypothyroidism  - continued PTA levothyroxine 150mcg daily   # HLD   # Gout   - continue PTA febuxostat 40mg daily      FEN: NPO prior to right heart cath, [id number] ghislaine combination diet   DVT Prophylaxis: Enoxaparin (Lovenox) SQ  GI Prophylaxis: not indicated  Disposition: Expected discharge: 1 - 2 days, recommended to prior living arrangement once evaluation of dyspnea and syncope.  Code Status: Full Code     Patient plan discussed with the staff attending who agrees with the plan     Rafi Huizar  PGY 1   Cardiology  [phone]       Subjective     Nursing notes reviewed.  Overnight patient desatted to 89% O2 without symptoms and was placed on 2L O2.   Denies n/f/v/c, HA, CP. Reports SOB is better than yesterday. Reports sleeping well overnight until being woken up to be placed on O2.         Objective     B/P: 123/58,  T: 98, P: Data Unavailable, R: 18    Intake/Output Summary (Last 24 hours) at 08/16/18 1033  Last data filed at 08/16/18 1024   Gross per 24 hour   Intake              480 ml   Output             4450 ml   Net            -3970 ml     Vitals:    08/15/18 1236 08/15/18 1800 08/16/18 0135   Weight: 136.7 kg (301 lb 6.4 oz) 137.7 kg (303 lb 9.6 oz) 135.5 kg (298 lb 11.2 oz)       General: AAOx3, not in acute distress  HEENT:  PERRL, EOMI, MMM with out pharyngeal erythema. 8-10cm JVD  Cardiac: RRR. No rubs, murmur, gallop. DP2+ bilaterally, +1 in legs   Pulm: CTAB, negative wheezes,  negative crackles.  Abd: +BS, soft, obese, non tender. No hepatosplenomegaly. obese  Skin: No rash  MSK: No deformities, no joint swelling, no clubbing/cyanosis, no edema, grossly edematous lower extremities with 1+ pitting edema   Neuro: CN grossly intact, no focal deficits  Psych: normal mood, full affect    Lab and imaging were reviewed in EPIC.

## 2018-08-16 NOTE — PLAN OF CARE
Problem: Patient Care Overview  Goal: Plan of Care/Patient Progress Review  Outcome: No Change  Vitals:    08/16/18 0339 08/16/18 0752 08/16/18 0848 08/16/18 1151   BP: 115/65 125/56 123/58 134/63   BP Location: Right arm Right arm  Right arm   Cuff Size: Adult Regular      Resp: 18 18 18   Temp: 97.8  F (36.6  C) 98  F (36.7  C)  98.1  F (36.7  C)   TempSrc: Oral Oral  Oral   SpO2: 95% 95%  99%   Weight:       Height:         Neuro: A&Ox4.   Cardiac: SR/SB. VSS.   Respiratory: Sating 99% on 2L.  GI/: Adequate urine output. BM 8/15  Diet/appetite: Combination diet ordered this afternoon after R heart cath.  Activity:  SBA, up to commode and ambulated in room  Pain: At acceptable level on current regimen.   Skin: No new deficits noted.  LDA's: PIV WNL    Plan: Continue with POC. Notify primary team with changes.

## 2018-08-16 NOTE — PLAN OF CARE
Problem: Patient Care Overview  Goal: Plan of Care/Patient Progress Review  Outcome: No Change  D/I/A:  Pt who presents this admission with increased SOB, dizziness and chest tightness with syncopal episode in clinic. HX of CABG x3 (7/2/18), DM2, COPD, CAD s/p stents (2011), and CKD3.  Pt noted to be a mouth breather and sats high 80's on spot check-pt put on 2L 02 and reports improvement with breathing.  Up with SBA.  I/O's per flow sheet.    P: Pt to have RHC today.  Possible thoracentesis in future.  Pt will also need to have someone address her need for a new home bipap (SW consult placed).  Continue to monitor and update md's with changes/concerns.

## 2018-08-16 NOTE — PROGRESS NOTES
Pt arrived from cath lab with right IJ site. Site WNL, no signs of bleeding noted. Pt denies pain at this time, will continue to monitor.

## 2018-08-16 NOTE — CONSULTS
Social Work Services Consult Note:     SW consulted for broken BiPap and resumption of homecare services.  Will defer to RNCC to follow for outpatient needs.  SW to close order.  Please re-consult if other needs arise.    SAMANTHA Baptiste, APSW  6C Unit   Phone: [phone]  Pager: [phone]  Unit: [phone]

## 2018-08-16 NOTE — PROGRESS NOTES
Care Coordinator Progress Note    Admission Date/Time:  8/15/2018  Attending MD:  Anjelica Rojas MD    Data  Chart reviewed, discussed with interdisciplinary team.   Patient was admitted for worsening SOB on exertion and syncopal episode in clinic.     Assessment  Concerns with insurance coverage for discharge needs: None.  Current Living Situation: Patient lives with mikemateOdalis.   Support System: Supportive and Involved  Services Involved: DME (Home CPAP), [facility]  Transportation at Discharge: Family or friend will provide  Transportation to Medical Appointments:   - Name of caregiver: Odalis Chadkristatatyana   Barriers to Discharge: Medical plan of care    Coordination of Care and Referrals: Provided patient/family with options for resumption of previous home care services. This writer met with pt's roommate, Odalis, pt sleeping. Odalis had questions regarding how pt's home care could be resumed at discharge. This writer explained that they are aware of current admission and resumption orders will be placed to continue care.   Per discussion with Odalis, pt has a home CPAP machine, but it has not been kept up lately and she was wondering if pt would qualify for a new machine through insurance. This writer explained that this depends on when she got the machine and would need to be checked with the DME company. Odalis stated she will follow up with pt to see if she knows where her CPAP is from or will check when she gets home.     Intervention  Orders placed for [facility] (Ph: [phone]) for resumption of care. RN evaluation post hospitalization. Assess vital signs, respiratory and cardiac status, activity tolerance, hydration, nutritional status, med setup and management. PT/OT eval and treat for deconditioning, strengthening and endurance. HHA as per previous orders for assistance with ADLS. Speech therapy eval and treat.      Plan  Anticipated Discharge Date: TBD  Anticipated Discharge Plan:  Discharge to home with resumption of home care services.   CC will continue to monitor patient's medical condition and progress towards discharge.  Prerna Gurrola RN BSN  6C Unit Care Coordinator  Phone number: [phone]  Pager: [phone]

## 2018-08-16 NOTE — PROGRESS NOTES
Brief procedure team note:    Cardiology team asked us to perform a thoracentesis today for patient with dyspnea, hypoxia; etiology remains incompletely clear.    US performed at bedside did show left effusion; smaller than expected based on CXR, and large amount of soft tissue.   This procedure would likely be possible by IR; but we felt would have increased risk at bedside.  Consider IR if thoracentesis desired.  Images saved in PACS    Eren Morgan MD  [facility] Hospitalist

## 2018-08-16 NOTE — PLAN OF CARE
Problem: Patient Care Overview  Goal: Plan of Care/Patient Progress Review  Outcome: No Change  D: Pt who presents this admission with increased SOB, dizziness and chest tightness. HX of CABG x3 (7/2/18), DM2, COPD, CAD s/p stents (2011), and CKD3.   I/A: Pt alert and oriented x4. Pt sinus rhythm with HRs 60-80s. Pt on RA with O2 sats >92%. Pt reports pain in lower back and incision area. Ketamine cream applied. Repositioning offered. Pt appetite good, last BM yesterday. IV iron currently running, pt tolerating well. Lasix given, pt voiding. Net I/O -1430 ml this shift. PO meds given with applesauce. Antifungal and interdry applied in affected areas.  P: Pt scheduled for RHC with biopsy tomorrow. Pt to be NPO at [facility]. Continue to monitor and assess pt with every encounter. Notify Cards 1 with any changes or questions.     Problem: Diabetes Comorbidity  Goal: Diabetes  Patient comorbidity will be monitored for signs and symptoms of hyperglycemia or hypoglycemia. Problems will be absent, minimized or managed by discharge/transition of care.  Outcome: No Change  Pt appetite good. BS this shift 207 and 155, sliding scale insulin given 1x. Scheduled humulin given. No s/s of hypo or hyperglycemia.

## 2018-08-16 NOTE — PROGRESS NOTES
08/16/18 0830   Rehab Discipline   Discipline OT   Type of Visit   Type of visit Initial Edema Evaluation   General Information   Start of care 08/16/18   Referring physician Jefry Camargo MD   Orders Evaluate and treat as indicated   Order date 08/15/18   Medical diagnosis 73 year old female with medical history significant for T2DM, COPD, CAD s/p  2x LAD stenting in 2011 and 3x CABG 7/2/18, CHF w/ EF 55-60% stage III CKD, HLD, fibromyalgia, lymphedema, hypothyroidism    Edema onset (chronic. Patient has used compression wraps for 10 years. )   Affected body parts RLE;LLE   Edema etiology comments cardiac disease, decreased mobility    Surgical / medical history reviewed Yes   Prior treatment Gradient compression bandaging   General observations patient with compression wraps applied prior to hospital admission (Reports applying 8/13).     Subjective Report   Patient report of symptoms patient reports increased LE edema from baseline.    Pain   Pain comments patient reports no pain in LEs at this time.    Edema Exam / Assessment   Skin condition Pitting;Dryness;Intact   Skin condition comments Skin intact with mild venous discoloration. Firm 1+/2+ pitting throughout LEs.    Pitting 1+;2+   Pitting location MTPs to knee creases.    Range of Motion   ROM No deficits were identified   Strength   Strength comments generalized weakness throughout.    Sensory   Sensory perception Light touch   Light touch Intact   Planned Edema Interventions   Planned edema interventions Gradient compression bandaging;Fit for compression garment;Exercises;Precautions to prevent infection / exacerbation;Education   Planned edema intervention comments GCB applied from MTPs to knee creases bilaterally.    Clinical Impression   Criteria for skilled therapeutic intervention met Yes   Therapy diagnosis Recommend continued use of compression wraps to manage chronic LE edema and protect skin integrity.    Influenced by the following  impairments / conditions Edema   Assessment of Occupational Performance 1-3 Performance Deficits   Identified Performance Deficits Decreased functional mobility.    Clinical Decision Making (Complexity) Low complexity   Treatment frequency 5 times / week   Treatment duration 1 week    Patient / family and/or staff in agreement with plan of care Yes   Risks and benefits of therapy have been explained Yes   Total Evaluation Time   Total evaluation time 5

## 2018-08-17 ENCOUNTER — TELEPHONE (OUTPATIENT)
Dept: FAMILY MEDICINE | Facility: CLINIC | Age: 73
End: 2018-08-17

## 2018-08-17 ENCOUNTER — CARE COORDINATION (OUTPATIENT)
Dept: CARDIOLOGY | Facility: CLINIC | Age: 73
End: 2018-08-17

## 2018-08-17 DIAGNOSIS — L30.9 DERMATITIS: ICD-10-CM

## 2018-08-17 NOTE — PROGRESS NOTES
I called Mariel Ribeiro this morning to follow-up with her post-hospitalization discharged from [facility] 8/16/2018.     She states so far she is doing well. She has no questions on her discharge medications. She does have a scale at home and will weigh herself daily. Instructed to call us if she has weight gain of 2 lbs in a day or 5 lbs in a week.      Medication Review:  I reviewed all her discharge medications with her and she had no questions regarding her medications.     Follow-up appointment review:  I reviewed Mariel Ribeiro follow-up appointments with her and she verbalized understanding. [facility]uled into [facility] per dc orders on 8/28 with Cathi Vaughn NP at 2:00 with labs prior. I reviewed the [facility]'s phone number and to call with any increase of SOB, increased edema or swelling, and weight gain. She verbalizes understanding and agrees with plan of care. Rosina aMys RN

## 2018-08-17 NOTE — TELEPHONE ENCOUNTER
Home care has question on 2 new anti fungal meds prescribed in hospital that has med interaction with some of her other meds.    She has a hospital follow up appt with you 8/23/2018   I approved Skilled nursing 1x wk for 1 wk, 2x wk for 2 wks, 1x wk for 1 wk & 2 as needed visit.  Home health aid to start on 8/19/18 1x wk for 3 wks. PT/OT, Lymphedema and Speech therapy kd

## 2018-08-17 NOTE — TELEPHONE ENCOUNTER
ED / Discharge Outreach Protocol    Patient Contact    Attempt # 1    Was call answered?  No.  Left message on voicemail with information to call me back.  Monica Gonzalez RN

## 2018-08-17 NOTE — TELEPHONE ENCOUNTER
"Hospital/TCU/ED for chronic condition Discharge Protocol    \"Hi, my name is Monica PEGUERO Susiefreddymichael, a registered nurse, and I am calling from [facility].  I am calling to follow up and see how things are going for you after your recent emergency visit/hospital/TCU stay.\"    Tell me how you are doing now that you are home?\" I just got home last night.  Re starting [facility] today and someone coming.  We reviewed the discharge notes.  [facility] to call hopefully as they need a visit nextweek.      Discharge Instructions    \"Let's review your discharge instructions.  What is/are the follow-up recommendations?  Pt. Response: she understood the med changes that were given at hospital along with her 1500 ml fluid restriction, daily weights and BP checks.    \"Has an appointment with your primary care provider been scheduled?\"   No (schedule appointment)  Scheduled for 8/23 with SAVI Blount covering for Dr. William who is out of clinic that week.    \"When you see the provider, I would recommend that you bring your medications with you.\"    Medications    \"Tell me what changed about your medicines when you discharged?\"    Changes to chronic meds?    Yes some dose changes which we went over.    \"What questions do you have about your medications?\"    None     New diagnoses of heart failure, COPD, diabetes, or MI?    She will have [facility] doing assessments.      On insulin: \"Did you start on insulin in the hospital or did you have your insulin dose changed?\"  nurse unsure if it changed.          Medication reconciliation completed? No, due to just being discharged. We went through the med changes which corelate with the med list.   Was MTM referral placed (*Make sure to put transitions as reason for referral)?   No    Call Summary    \"What questions or concerns do you have about your recent visit and your follow-up care?\"     None at this time.      \"If you have questions or things don't continue to improve, we " "encourage you contact us through the main clinic number (give number).  Even if the clinic is not open, triage nurses are available 24/7 to help you.     We would like you to know that our clinic has extended hours (provide information).  We also have urgent care (provide details on closest location and hours/contact info)\"      \"Thank you for your time and take care!\"             "

## 2018-08-17 NOTE — TELEPHONE ENCOUNTER
Ora called from  home care requesting orders for:    Skilled nursing 1x wk for 1 wk, 2x wk for 2 wks, 1x wk for 1 wk & 2 prn    Home health aid to start on 8/19/18 1x wk for 3 wks    PT/OT eval 1 every 10 days 1   Lymphedema eval 1 every 30 days 1    Speech therapy eval 1 every 10 days 1    Please advise

## 2018-08-17 NOTE — PLAN OF CARE
Problem: Patient Care Overview  Goal: Plan of Care/Patient Progress Review  Outcome: No Change  AVS reviewed with patient and partner, questions answered, verbalized understanding. PIV removed. Scripts sent to home pharmacy. Provided WC for pt and partner assisted pt off unit.

## 2018-08-20 ENCOUNTER — ALLIED HEALTH/NURSE VISIT (OUTPATIENT)
Dept: PHARMACY | Facility: CLINIC | Age: 73
End: 2018-08-20
Payer: COMMERCIAL

## 2018-08-20 ENCOUNTER — DOCUMENTATION ONLY (OUTPATIENT)
Dept: CARE COORDINATION | Facility: CLINIC | Age: 73
End: 2018-08-20

## 2018-08-20 DIAGNOSIS — D62 ACUTE POSTHEMORRHAGIC ANEMIA: ICD-10-CM

## 2018-08-20 DIAGNOSIS — R55 VASOVAGAL SYNCOPE: ICD-10-CM

## 2018-08-20 DIAGNOSIS — I10 HYPERTENSION GOAL BP (BLOOD PRESSURE) < 130/80: ICD-10-CM

## 2018-08-20 DIAGNOSIS — E87.6 HYPOKALEMIA: ICD-10-CM

## 2018-08-20 DIAGNOSIS — Z95.1 S/P CABG X 3: ICD-10-CM

## 2018-08-20 DIAGNOSIS — E11.22 TYPE 2 DIABETES MELLITUS WITH CHRONIC KIDNEY DISEASE, WITHOUT LONG-TERM CURRENT USE OF INSULIN, UNSPECIFIED CKD STAGE (H): Primary | ICD-10-CM

## 2018-08-20 PROCEDURE — 99606 MTMS BY PHARM EST 15 MIN: CPT | Performed by: PHARMACIST

## 2018-08-20 PROCEDURE — 99607 MTMS BY PHARM ADDL 15 MIN: CPT | Performed by: PHARMACIST

## 2018-08-20 NOTE — Clinical Note
Rafaela /Lucrecia --iván doing well--slow recovery from triple bypass , blood sugars improved.  lucrecia--she is seeing you on 8-23-18 --please recheck her potassium lab that day .  Thank you , Karen Hilton [facility]. Medication Therapy Management Provider [phone]

## 2018-08-20 NOTE — TELEPHONE ENCOUNTER
Recommend this be discussed with SAVI Blount on 8/23/18.    Will forward note to her for review prior to visit.    Jc Martin MD  Family Medicine Physician

## 2018-08-20 NOTE — TELEPHONE ENCOUNTER
"HC nurse wants to know if clotrimazole (LOTRIMIN) 1 % cream and   miconazole (MICATIN; MICRO GUARD) 2 % powder are okay for patient to use. A \"medication interaction\" pop ups up for them d/t other medications she is on.    Please advise.  Rosina Steen RN, BSN    "

## 2018-08-20 NOTE — PROGRESS NOTES
SUBJECTIVE/OBJECTIVE:                Mariel Ribeiro is a 73 year old female called for a transitions of care visit.  She was discharged from [facility]  on 8-16-18 for TOWNSEND /syncopy episodes.      Chief Complaint:    Mariel Ribeiro is a 73 year old female who presented on 8/15/2018 with complaint of worsening TOWNSEND and syncopal episode during cardiology clinic visit.    Her main c/o water weight in LL--wears bandages to help --also bumex helps --slow recovery. Taking oral pills with apple sauce due to swallowing issues. Had been a reoccurring problem for her post intubation .     She feels still winded etc and feels she just needs time to heal /recover.   Will start cardiac rehab when  home care releases her.      Personal Healthcare Goals: recover from 3 level bypass.     Allergies/ADRs: Reviewed in Epic  Tobacco: History of tobacco dependence - quit July-1989.   Alcohol: not currently using  Caffeine: 1 cups/day of tea  Activity: sedentary for now -- will start cardiac rehab- when cleared by homecare.   PMH: Reviewed in Epic    Medication Adherence/Access:  Patient uses pill box(es) and has assistance with medication administration: caregiver.  Patient takes medications 3 time(s) per day.  Per patient, misses medication 1-5 times per week. Missing the lunch prandin . Saturday am --missed humulin nph dose --had her high bs 207 next reading. She overslept.  Medication barriers: none.   The patient fills medications at [place]: NO, fills medications at [facility].      Hypokalemia:  MTM reviewed K+ lab from 8-16-18- 3.1--now taking 2 tabs bid . Will see JULIANA Blount 8-23 --recheck K+.         # Dyspnea on exertion:  COPD: using proair up to 4 x day and other days prn only. SOB- with walking around and after exercises.  Could be a result of progressive COPD, decompensated heart failure 2/2, obesity hypoventilation syndrome, excessive dynamic airway collapse, left pleural effusion. NT-BNP 8/15 is elevated at 1149, but has  also been elevated at ~950 in that past. Echo 8/15 shows an EF of 40-45% reduced from 55-60% (7/20) however the IVC remains dilated with diameter of 2.6cm with both studies. Patient was diuresed 8/15 with 80mg of lasix and 500mg diuril. 8/16 patient had right heart cath with PCW 18, RA 13, PA 33, RV 15, CI 1.53, SVR 2444, /86. Patient thought to be nervous during catheterization and reason why elevated pressures. Patient at baseline weight and is not thought to be volume overloaded, however patient did report some improvement in SOB after diuresis.   - consider out patient PFTs  - consider out patient expiratory CT scan to exclude EDAC  - discharge with bumex 1mg in AM and 2mg in PM--pt. Taking 2mg in am and 1 mg in pm - nocturia x 1 only now.       # Syncope  Could be secondary to vaso vagal, orthostatic hypotension, hypoglycemia, arrhythmia. Less likely arrhythmia due to normal EKG today and previously without known diagnosis of arrhythmia. Possibly to be hypoglycemia due to patient fasting for lab draw today. Cannot exclude orthostatic hypotension. Possibly vaso vagal response due to concurrent JVP testing at time of syncopal episode. Patient did not experience further syncopal episodes during admission and telemetry did not show any significant events.   - consider tilt table testing as out patient       # Left pleural effusion  Pleural effusion on cxr 8/15 has increased in size since last cxr 7/8. Trivial pericardial effusion observed on 8/15 echo. Could represent late onset effusion 2/2 to surgical trauma. Less likely to be postpericardiotomy syndrome as patient does not have signs of pericarditis at present time. More likely to be a result of CHF. Patient underwent thoracocentesis 8/16 however CAPS team was unable to tap effusion and recommended possible IR thoracocentesis.   - recommend follow up with PCP to monitor       # HTN  # CAD s/p 2x LAD stent 2011 and CABG 7/2/18  # HLD  Blood pressure stable  during admission with hydralazine held.   - continue PTA aspirin 325 daily   - continue PTA atorvastatin 40mg daily   - continue PTA metoprolol 75mg BID  - discontinue PTA hydralazine 75mg TID  - discharge with bumex 2mg in AM and 1mg in PM--per patient.     She is checking weight daily --298.5lbs now , fluid restriction 1500ml/day .  HHN checks daily -131/54mmhg, 94/48mmhg o2-95 pulse 64.         # Normocytic anemia, chronic   Hemoglobin 9.7 upon admission. Base line ~12. Patient treatment c/b hematoma in right leg. Last received transfusion 7/25. Patient received 2 doses of  IV iron sucrose 200U during admission.    - discharge with PO ferric gluconate tablets   - trend CBC daily           Type-2 DM :  Last a1c 7-1-18- 9.3% .vials novolin -nph- on hold until hospital meds run out--  Now on  prandin 1mg tid ac , humulin-NPH - am -35 units and dinner 35 units .  Testing 4 x day :  Am fasting - , before lunch range - -gxqoog <140, before dinner -  --mostly <140, before bedtime - 115-227--mostly <150.    She is working with endo dept.-[facility].           ASSESSMENT:                 Current medications were reviewed today.      Medication Adherence: good, needs improvement - see below      Hypokalemia:   Needs improvement --see Dania 8-23 for K+ lab recheck.       # Dyspnea on exertion(COPD);  Slow improvement --pt. And mtm both feel just needs time to recover from her surgeries , heal and get better over next 6 months , if breathing worsens go to ER.          # Syncope: stable now.        # Left pleural effusion: Will have pcp monitor.        # HTN  # CAD s/p 2x LAD stent 2011 and CABG 7/2/18  # HLD:    Stable now --daily HHN checks.      # Normocytic anemia, chronic :  Needs improvement -- taking oral iron --recheck HGB late fall -2018.         Type-2 DM :  Needs improvement -- reviewed her home bs's over phone --prandin and NPH basal insulins working well --stay the course --work with  loyd at [facility].  Only thing to work on --she has been missing lunch doses of prandin --try not to do that .       PLAN:                  1. FYI--Please have Rosina recheck your Potassium lab on 8-23-18.   2. FYI--Keep taking insulin doses and prandin daily --try not to miss lunch dose of prandin.      Follow-up in November/december if your able to/interested.     I spent 60 minutes with this patient today. All changes were made via collaborative practice agreement with Dr. William. A copy of the visit note was provided to the patient's primary care provider.        The patient was sent via [employer] a summary of these recommendations as an after visit summary.    Karen Hilton Rph.  Medication Therapy Management Provider  [phone]

## 2018-08-20 NOTE — PROGRESS NOTES
[facility] now requests orders and shares plan of care/discharge summaries for some patients through [employer].  Please REPLY TO THIS MESSAGE OR ROUTE BACK TO THE AUTHOR in order to give authorization for orders when needed.  This is considered a verbal order, you will still receive a faxed copy of orders for signature.  Thank you for your assistance in improving collaboration for our patients.    ORDER Continued home PT 1w1, 2w3 for LE strengthening, improved activity tolerance, transfer training, gait training, and fall prevention.    Rossi Lundberg, PT  [email or web address]  [phone]

## 2018-08-20 NOTE — PATIENT INSTRUCTIONS
Dear Mariel,     It was a pleasure talking with you today regarding your medications and medical concerns. The following is a summary of what we discussed.     1. FYI--Please have Rosina recheck your Potassium lab on 8-23-18.   2. FYI--Keep taking insulin doses and prandin daily --try not to miss lunch dose of prandin.      Follow-up in November/december if your able to/interested.     Thank you for your time and please feel free to call ([phone] voicemail/pager or [phone] clinic main line) or e-mail ([email or web address]) with any questions.     Karen Hilton Rph.  Medication Therapy Management Provider  [phone]

## 2018-08-20 NOTE — TELEPHONE ENCOUNTER
I can't tell if there is a question here for me to answer or if it is being directed to a different provider.    Is this just an FYI.    Jc Martin MD  Family Medicine Physician

## 2018-08-21 ENCOUNTER — TELEPHONE (OUTPATIENT)
Dept: FAMILY MEDICINE | Facility: CLINIC | Age: 73
End: 2018-08-21

## 2018-08-21 ENCOUNTER — TELEPHONE (OUTPATIENT)
Dept: CARDIOLOGY | Facility: CLINIC | Age: 73
End: 2018-08-21

## 2018-08-21 ENCOUNTER — CARE COORDINATION (OUTPATIENT)
Dept: CARDIOLOGY | Facility: CLINIC | Age: 73
End: 2018-08-21

## 2018-08-21 NOTE — PROGRESS NOTES
Returned call from Rafaela, patient's home care nurse. Mariel had gained 2.5 lbs since yesterday and was experiencing some shortness of breath. I am hesitant to have her take another bumex as she was recently hospitalized after a fall which was deemed from orthostasis. Instructed her to have patient weigh in am and call with her wt.  If it is continuing to increase, she may need to take one extra dose of bumex.

## 2018-08-21 NOTE — PROGRESS NOTES
Ok for these orders. (I am covering for Dr William who is away from the office today.)     Colby Ramos MD

## 2018-08-21 NOTE — TELEPHONE ENCOUNTER
Joann from  Home Care called to advise that the diagnosis for the patients XR VIDEO SPEECH W ESOPHAGRAM is incorrect.  Home care was not sure what proper diagnosis should be.

## 2018-08-21 NOTE — TELEPHONE ENCOUNTER
[facility] Call Center    Phone Message    May a detailed message be left on voicemail: yes    Reason for Call: Symptoms or Concerns     If patient has red-flag symptoms, warm transfer to triage line    Current symptom or concern: Weight gain, shortness of breath    Symptoms have been present for:  1 day(s)    Has patient previously been seen for this? Yes    By : Dr. Wolf    Date: N/A    Are there any new or worsening symptoms? Yes: Rafaela with [facility] is reporting that the patients' weight has gone from 298.5 to 301 overnight, and she is experiencing shortness of breath. Please give Rafaela a call, [phone].      Action Taken: Message routed to:  [facility] ([facility]): Cardiology

## 2018-08-22 ENCOUNTER — DOCUMENTATION ONLY (OUTPATIENT)
Dept: CARE COORDINATION | Facility: CLINIC | Age: 73
End: 2018-08-22

## 2018-08-22 NOTE — PROGRESS NOTES
Yes, a verbal okay is given for these requested orders.  In the event that a physician approval if necessary, please route this back to one of the [facility] physicians.  Thank you.

## 2018-08-22 NOTE — PROGRESS NOTES
[facility] now requests orders and shares plan of care/discharge summaries for some patients through [employer].  Please REPLY TO THIS MESSAGE OR ROUTE BACK TO THE AUTHOR in order to give authorization for orders when needed.  This is considered a verbal order, you will still receive a faxed copy of orders for signature.  Thank you for your assistance in improving collaboration for our patients.    ORDER  Requesting OK to see pt for OT lymphedema therapy treatment to address BLE edema includes edema reduction, fit pt with velcro closure compression garments, establish home edema management program and instruct on edema exercise to faciliate lymphatic flow.     Rosina Angulo, JOE/DOMINIC, CLT  [phone]  [email or web address]

## 2018-08-23 ENCOUNTER — DOCUMENTATION ONLY (OUTPATIENT)
Dept: CARE COORDINATION | Facility: CLINIC | Age: 73
End: 2018-08-23

## 2018-08-23 ENCOUNTER — OFFICE VISIT (OUTPATIENT)
Dept: FAMILY MEDICINE | Facility: CLINIC | Age: 73
End: 2018-08-23
Payer: MEDICARE

## 2018-08-23 ENCOUNTER — TELEPHONE (OUTPATIENT)
Dept: FAMILY MEDICINE | Facility: CLINIC | Age: 73
End: 2018-08-23

## 2018-08-23 VITALS — BODY MASS INDEX: 48.26 KG/M2 | HEART RATE: 70 BPM | OXYGEN SATURATION: 96 % | TEMPERATURE: 97 F | WEIGHT: 293 LBS

## 2018-08-23 DIAGNOSIS — G58.9 MONONEURITIS: ICD-10-CM

## 2018-08-23 DIAGNOSIS — F33.41 RECURRENT MAJOR DEPRESSIVE DISORDER, IN PARTIAL REMISSION (H): ICD-10-CM

## 2018-08-23 DIAGNOSIS — E03.8 OTHER SPECIFIED HYPOTHYROIDISM: ICD-10-CM

## 2018-08-23 DIAGNOSIS — D50.9 IRON DEFICIENCY ANEMIA, UNSPECIFIED IRON DEFICIENCY ANEMIA TYPE: ICD-10-CM

## 2018-08-23 DIAGNOSIS — Z95.1 S/P CABG X 3: Primary | Chronic | ICD-10-CM

## 2018-08-23 DIAGNOSIS — B37.31 CANDIDIASIS OF VULVA AND VAGINA: ICD-10-CM

## 2018-08-23 DIAGNOSIS — B37.2 CUTANEOUS CANDIDIASIS: ICD-10-CM

## 2018-08-23 DIAGNOSIS — J44.9 CHRONIC OBSTRUCTIVE PULMONARY DISEASE, UNSPECIFIED COPD TYPE (H): ICD-10-CM

## 2018-08-23 DIAGNOSIS — E87.6 HYPOKALEMIA: ICD-10-CM

## 2018-08-23 LAB
ERYTHROCYTE [DISTWIDTH] IN BLOOD BY AUTOMATED COUNT: 15.3 % (ref 10–15)
HCT VFR BLD AUTO: 38.3 % (ref 35–47)
HGB BLD-MCNC: 11.1 G/DL (ref 11.7–15.7)
MCH RBC QN AUTO: 24.9 PG (ref 26.5–33)
MCHC RBC AUTO-ENTMCNC: 29 G/DL (ref 31.5–36.5)
MCV RBC AUTO: 86 FL (ref 78–100)
PLATELET # BLD AUTO: 140 10E9/L (ref 150–450)
RBC # BLD AUTO: 4.45 10E12/L (ref 3.8–5.2)
WBC # BLD AUTO: 7.4 10E9/L (ref 4–11)

## 2018-08-23 PROCEDURE — 85027 COMPLETE CBC AUTOMATED: CPT | Performed by: NURSE PRACTITIONER

## 2018-08-23 PROCEDURE — 80048 BASIC METABOLIC PNL TOTAL CA: CPT | Performed by: NURSE PRACTITIONER

## 2018-08-23 PROCEDURE — 36415 COLL VENOUS BLD VENIPUNCTURE: CPT | Performed by: NURSE PRACTITIONER

## 2018-08-23 PROCEDURE — 99495 TRANSJ CARE MGMT MOD F2F 14D: CPT | Performed by: NURSE PRACTITIONER

## 2018-08-23 RX ORDER — FLUCONAZOLE 150 MG/1
150 TABLET ORAL
Qty: 4 TABLET | Refills: 0 | Status: SHIPPED | OUTPATIENT
Start: 2018-08-23 | End: 2018-09-26

## 2018-08-23 RX ORDER — POTASSIUM CHLORIDE 750 MG/1
40 TABLET, EXTENDED RELEASE ORAL DAILY
Qty: 120 TABLET | Refills: 1 | Status: SHIPPED | OUTPATIENT
Start: 2018-08-23 | End: 2018-08-28

## 2018-08-23 RX ORDER — LEVOTHYROXINE SODIUM 150 UG/1
150 TABLET ORAL DAILY
Qty: 30 TABLET | Refills: 0 | Status: ON HOLD | OUTPATIENT
Start: 2018-08-23 | End: 2019-05-02

## 2018-08-23 RX ORDER — ALBUTEROL SULFATE 90 UG/1
1-2 AEROSOL, METERED RESPIRATORY (INHALATION) EVERY 4 HOURS PRN
Qty: 1 INHALER | Status: ON HOLD | OUTPATIENT
Start: 2018-08-23 | End: 2019-05-02

## 2018-08-23 ASSESSMENT — ANXIETY QUESTIONNAIRES
3. WORRYING TOO MUCH ABOUT DIFFERENT THINGS: NOT AT ALL
5. BEING SO RESTLESS THAT IT IS HARD TO SIT STILL: NOT AT ALL
6. BECOMING EASILY ANNOYED OR IRRITABLE: NOT AT ALL
1. FEELING NERVOUS, ANXIOUS, OR ON EDGE: NOT AT ALL
GAD7 TOTAL SCORE: 0
2. NOT BEING ABLE TO STOP OR CONTROL WORRYING: NOT AT ALL
7. FEELING AFRAID AS IF SOMETHING AWFUL MIGHT HAPPEN: NOT AT ALL

## 2018-08-23 ASSESSMENT — PATIENT HEALTH QUESTIONNAIRE - PHQ9: 5. POOR APPETITE OR OVEREATING: NOT AT ALL

## 2018-08-23 NOTE — MR AVS SNAPSHOT
After Visit Summary   8/23/2018    Mariel Ribeiro    MRN: [id number]           Patient Information     Date Of Birth          1945        Visit Information        Provider Department      8/23/2018 11:30 AM Rosina Blount NP [facility]        Today's Diagnoses     S/P CABG x 3    -  1    Chronic obstructive pulmonary disease, unspecified COPD type (H)        Hypokalemia        Cutaneous candidiasis        Iron deficiency anemia, unspecified iron deficiency anemia type        Candidiasis of vulva and vagina        Mononeuritis        Other specified hypothyroidism           Follow-ups after your visit        Your next 10 appointments already scheduled     Aug 28, 2018  1:30 PM CDT   Lab with  LAB    Health Lab ([facility])    [address]  1st Floor  [facility] [id number] [phone]            Aug 28, 2018  2:00 PM CDT   (Arrive by 1:45 PM)   CORE RETURN with CHANTAL Martinez [facility] ([facility])    [address]  [address] [place] [id number] [phone]            Sep 04, 2018 11:00 AM CDT   Office Visit with Rafaela Willima MD   [facility] ([facility])    [address] [place] [id number] [phone]           Bring a current list of meds and any records pertaining to this visit. For Physicals, please bring immunization records and any forms needing to be filled out. Please arrive 10 minutes early to complete paperwork.            Oct 18, 2018  1:30 PM CDT   (Arrive by 1:15 PM)   RETURN DIABETES with Odalis Medley PA-C   [facility] ([facility] [place])    [address]  3rd Floor  [facility] [id number] [phone]            Jan 14, 2019  2:30 PM CST   (Arrive by 2:15 PM)   RETURN DIABETES with Keven Jaeger MD   [facility] (  [facility])    [address]  3rd Floor  [facility] [address] [phone]              Who to contact     If you have questions or need follow up information about today's clinic visit or your schedule please contact [facility] directly at [phone].  Normal or non-critical lab and imaging results will be communicated to you by MyChart, letter or phone within 4 business days after the clinic has received the results. If you do not hear from us within 7 days, please contact the clinic through [employer] or phone. If you have a critical or abnormal lab result, we will notify you by phone as soon as possible.  Submit refill requests through [employer] or call your pharmacy and they will forward the refill request to us. Please allow 3 business days for your refill to be completed.          Additional Information About Your Visit        MyChart Information     [employer] gives you secure access to your electronic health record. If you see a primary care provider, you can also send messages to your care team and make appointments. If you have questions, please call your primary care clinic.  If you do not have a primary care provider, please call [phone] and they will assist you.        Care EveryWhere ID     This is your Care EveryWhere ID. This could be used by other organizations to access your [facility] medical records  [id number]        Your Vitals Were     Pulse Temperature Pulse Oximetry BMI (Body Mass Index)          70 97  F (36.1  C) (Oral) 96% 48.26 kg/m2         Blood Pressure from Last 3 Encounters:   08/23/18 (P) 121/68   08/16/18 130/52   08/15/18 98/63    Weight from Last 3 Encounters:   08/23/18 299 lb (135.6 kg)   08/16/18 298 lb 11.2 oz (135.5 kg)   08/15/18 303 lb (137.4 kg)              We Performed the Following     Basic metabolic panel     CBC with platelets          Today's Medication Changes          These changes are accurate as of  8/23/18  1:10 PM.  If you have any questions, ask your nurse or doctor.               Start taking these medicines.        Dose/Directions    fluconazole 150 MG tablet   Commonly known as:  DIFLUCAN   Used for:  Candidiasis of vulva and vagina   Started by:  Rosina Blount NP        Dose:  150 mg   Take 1 tablet (150 mg) by mouth every 3 days   Quantity:  4 tablet   Refills:  0       potassium chloride SA 10 MEQ CR tablet   Commonly known as:  K-DUR/KLOR-CON M   Used for:  Hypokalemia   Started by:  Rosina Blount NP        Dose:  40 mEq   Take 4 tablets (40 mEq) by mouth daily   Quantity:  120 tablet   Refills:  1         These medicines have changed or have updated prescriptions.        Dose/Directions    acetaminophen 325 MG tablet   Commonly known as:  TYLENOL   This may have changed:  when to take this   Used for:  S/P CABG x 3, Acute post-operative pain        Dose:  650 mg   Take 2 tablets (650 mg) by mouth every 4 hours as needed for mild pain   Quantity:  100 tablet   Refills:  0       LIFESCAN FINEPOINT LANCETS Misc   This may have changed:  additional instructions   Used for:  Type 2 diabetes mellitus with diabetic polyneuropathy, without long-term current use of insulin (H)        Use to test blood sugars 2 times daily or as directed.   Quantity:  100 each   Refills:  prn       ONE TOUCH ULTRA (DEVICES) Misc   This may have changed:  See the new instructions.   Used for:  Type II or unspecified type diabetes mellitus without mention of complication, not stated as uncontrolled        test blood sugar BID   Quantity:  1   Refills:  0            Where to get your medicines      [id number] - [place], [place] - [address] AT [facility] [address]  [address], [place] [address]    Hours:  24-hours Phone:  [phone]     albuterol 108 (90 Base) MCG/ACT inhaler    fluconazole 150 MG tablet    levothyroxine 150 MCG tablet     potassium chloride SA 10 MEQ CR tablet         Some of these will need a paper prescription and others can be bought over the counter.  Ask your nurse if you have questions.     Bring a paper prescription for each of these medications     ketamine (KETALAR) 5%-gabapentin (NEURONTIN) 8% 5%-8% Gel topical PLO cream                Primary Care Provider Office Phone # Fax #    Rafaela William [phone] [address] [place] [address]        Equal Access to Services     KJ SALVADOR : Hadii aad ku hadasho Soomaali, waaxda luqadaha, qaybta kaalmada adeegyada, waxay judahin haychris pena . So [facility] [phone].    ATENCIÓN: Si habla español, tiene a gillespie disposición servicios gratuitos de asistencia lingüística. [facility] [phone].    We comply with applicable federal civil rights laws and [place] laws. We do not discriminate on the basis of race, color, national origin, age, disability, sex, sexual orientation, or gender identity.            Thank you!     Thank you for choosing [facility]  for your care. Our goal is always to provide you with excellent care. Hearing back from our patients is one way we can continue to improve our services. Please take a few minutes to complete the written survey that you may receive in the mail after your visit with us. Thank you!             Your Updated Medication List - Protect others around you: Learn how to safely use, store and throw away your medicines at www.disposemymeds.org.          This list is accurate as of 8/23/18  1:10 PM.  Always use your most recent med list.                   Brand Name Dispense Instructions for use Diagnosis    acetaminophen 325 MG tablet    TYLENOL    100 tablet    Take 2 tablets (650 mg) by mouth every 4 hours as needed for mild pain    S/P CABG x 3, Acute post-operative pain       albuterol 108 (90 Base) MCG/ACT inhaler    PROAIR HFA    1 Inhaler    Inhale 1-2 puffs into the  lungs every 4 hours as needed for wheezing    Chronic obstructive pulmonary disease, unspecified COPD type (H)       aspirin 325 MG EC tablet     30 tablet    Take 1 tablet by mouth daily.        atorvastatin 40 MG tablet    LIPITOR    30 tablet    Take 1 tablet (40 mg) by mouth daily    Atherosclerosis of native coronary artery without angina pectoris, unspecified whether native or transplanted heart       B-D U/F 31G X 5 MM   Generic drug:  insulin pen needle     100 each    USE FOR INSULIN INJECTION    Type 2 diabetes mellitus with diabetic polyneuropathy, without long-term current use of insulin (H)       blood glucose monitoring test strip    no brand specified    200 each    1 strip by In Vitro route 2 times daily Strips per patient's preference    Type 2 diabetes mellitus with diabetic polyneuropathy, without long-term current use of insulin (H)       * bumetanide 2 MG tablet    BUMEX    30 tablet    Take 1 tablet (2 mg) by mouth every morning    Chronic diastolic heart failure (H)       * bumetanide 1 MG tablet    BUMEX    30 tablet    Take 1 tablet (1 mg) by mouth every evening    Chronic diastolic heart failure (H)       clotrimazole 1 % cream    LOTRIMIN    60 g    Apply topically 2 times daily    Dermatitis       EPINEPHrine 0.3 MG/0.3ML injection 2-pack    EPIPEN/ADRENACLICK/or ANY BX GENERIC EQUIV    0.6 mL    Inject 0.3 mLs (0.3 mg) into the muscle once as needed for anaphylaxis    Allergic reaction, subsequent encounter       escitalopram 20 MG tablet    LEXAPRO    30 tablet    Take 1 tablet (20 mg) by mouth every morning    Recurrent major depressive disorder, in partial remission (H)       febuxostat 40 MG Tabs tablet    ULORIC    30 tablet    Take 1 tablet (40 mg) by mouth every evening    Vitamin D deficiency       ferrous gluconate 324 (38 Fe) MG tablet    FERGON    12 tablet    Take 1 tablet (324 mg) by mouth Every Mon, Wed, Fri Morning    Chronic diastolic heart failure (H), Iron deficiency  anemia secondary to inadequate dietary iron intake       fluconazole 150 MG tablet    DIFLUCAN    4 tablet    Take 1 tablet (150 mg) by mouth every 3 days    Candidiasis of vulva and vagina       guaiFENesin 600 MG 12 hr tablet    MUCINEX     Take 1 tablet (600 mg) by mouth 2 times daily        * insulin isophane human 100 UNIT/ML injection    HumuLIN N PEN    10.5 mL    Inject 35 Units Subcutaneous daily (with dinner)    Type 2 diabetes mellitus with diabetic chronic kidney disease, unspecified CKD stage, unspecified long term insulin use status (H)       * insulin isophane human 100 UNIT/ML injection    HumuLIN N PEN    10.5 mL    Inject 35 Units Subcutaneous every 24 hours    Type 2 diabetes mellitus with diabetic chronic kidney disease, unspecified CKD stage, unspecified long term insulin use status (H)       insulin syringes (disposable) U-100 1 ML Misc     100 each    1 each 5 times daily    Type 2 diabetes mellitus with chronic kidney disease, without long-term current use of insulin, unspecified CKD stage (H)       ketamine (KETALAR) 5%-gabapentin (NEURONTIN) 8% 5%-8% Gel topical PLO cream     30 g    Apply 30 g topically 3 times daily    Mononeuritis       levothyroxine 150 MCG tablet    SYNTHROID/LEVOTHROID    30 tablet    Take 1 tablet (150 mcg) by mouth daily    Other specified hypothyroidism       LIFESCAN FINEPOINT LANCETS Misc     100 each    Use to test blood sugars 2 times daily or as directed.    Type 2 diabetes mellitus with diabetic polyneuropathy, without long-term current use of insulin (H)       losartan 25 MG tablet    COZAAR    15 tablet    Take 0.5 tablets (12.5 mg) by mouth daily    Chronic diastolic heart failure (H)       Metoprolol Tartrate 75 MG Tabs     180 tablet    Take 75 mg by mouth 2 times daily    Atherosclerosis of native coronary artery without angina pectoris, unspecified whether native or transplanted heart       miconazole 2 % powder    MICATIN; MICRO GUARD    71 g    Apply  topically 2 times daily    Dermatitis       nitroGLYcerin 0.4 MG sublingual tablet    NITROSTAT    25 tablet    For chest pain place 1 tablet under the tongue every 5 minutes for 3 doses. If symptoms persist 5 minutes after 1st dose call 911.    Atherosclerosis of native coronary artery without angina pectoris, unspecified whether native or transplanted heart       ONE TOUCH ULTRA (DEVICES) Misc     1    test blood sugar BID    Type II or unspecified type diabetes mellitus without mention of complication, not stated as uncontrolled       polyethylene glycol Packet    MIRALAX/GLYCOLAX    7 packet    Take 17 g by mouth daily    Atherosclerosis of native coronary artery without angina pectoris, unspecified whether native or transplanted heart       potassium chloride SA 10 MEQ CR tablet    K-DUR/KLOR-CON M    120 tablet    Take 4 tablets (40 mEq) by mouth daily    Hypokalemia       pregabalin 100 MG capsule    LYRICA    90 capsule    Take 1 capsule (100 mg) by mouth 2 times daily    Pain in joint of left shoulder       repaglinide 1 MG tablet    PRANDIN    90 tablet    Take 1 tablet (1 mg) by mouth 3 times daily (before meals)    Type 2 diabetes mellitus with diabetic chronic kidney disease, unspecified CKD stage, unspecified long term insulin use status (H)       senna-docusate 8.6-50 MG per tablet    SENOKOT-S;PERICOLACE    30 tablet    Take 1-2 tablets by mouth 2 times daily as needed for constipation    Atherosclerosis of native coronary artery without angina pectoris, unspecified whether native or transplanted heart       TIZANIDINE HCL PO      Take 4 mg by mouth At Bedtime        traZODone 50 MG tablet    DESYREL    270 tablet    Take 3 tablets (150 mg) by mouth At Bedtime    Recurrent major depressive disorder, in partial remission (H)       * Notice:  This list has 4 medication(s) that are the same as other medications prescribed for you. Read the directions carefully, and ask your doctor or other care provider  to review them with you.

## 2018-08-23 NOTE — PROGRESS NOTES
[facility] now requests orders and shares plan of care/discharge summaries for some patients through [employer].  Please REPLY TO THIS MESSAGE OR ROUTE BACK TO THE AUTHOR in order to give authorization for orders when needed.  This is considered a verbal order, you will still receive a faxed copy of orders for signature.  Thank you for your assistance in improving collaboration for our patients.    ORDER  OT 1w4 for HEP, activity tolerance, EC/WS, ADL/IADL safety.  The plan will also include falls prevention plan, monitor and treat pain, monitor skin integrity, continence management, monitor for s/s of depression, diabetic foot care, monitor for symptoms of heart failure and to achieve the following goals.  In 4 weeks,   1. Pt will demo indep with B UE HEP following written handout for inc activity tolerance for inc indep and safety with bathing and cooking.  2. pt will demo understanding re use of adaptive techniques and AE for inc indep and safety with dressing and bathing.  3. pt will demo mod indep and good safety with simple meal prep for inc indep and safety with cooking.  4. pt will demo understanding re energy conservation and work simplification for inc indep and safety with dressing, bathing and cooking.  5. pt will demo understanding and competence in anxiety mgmt techniques for inc indep and safety with cooking and bathing.    Elza Willoughby, OTR/L  [phone]

## 2018-08-23 NOTE — TELEPHONE ENCOUNTER
Reason for Call:  Other prescription    Detailed comments: Nadeen @ [facility]  states they are unable to obtain ingredients to make it.    Phone Number Patient can be reached at: Other phone number:  [phone]    Best Time: asap    Can we leave a detailed message on this number? Not Applicable    Call taken on 8/23/2018 at 2:52 PM by Gaye Conte

## 2018-08-23 NOTE — PROGRESS NOTES
SUBJECTIVE:   Mariel Ribeiro is a 73 year old female who presents to clinic today for the following health issues:          Hospital Follow-up Visit:    Hospital/Nursing Home/IP Rehab Facility: [facility]  Date of Admission: 8/15  Date of Discharge: 8/16  Reason(s) for Admission: see below    Mariel Ribeiro is a 73 year old female with medical history significant for T2DM, COPD, CAD s/p  2x LAD stenting in 2011 and 3x CABG 7/2/18, CHF w/ EF 55-60% stage III CKD, HLD, fibromyalgia, lymphedema, hypothyroidism who is a direct admit from cardiology clinic at the direction of Dr. Wolf for syncopal episode in clinic and worsening SOB and dizziness on exertion.       Patient recently admitted for 3x CABG of LAD, rPDA and OM2 7/2/18 and was subsequently discharged from rehab 7/23. Patient does reports chest pain from her sternotomy site however states this is different from the chest tightness experienced on exertion since her surgery. Patient reports she last experienced chest tightness on 8/13 and denies any today. Patient reports chronic TOWNSEND and that it is worse after her 3x CABG than before. Patient reports that she last went for a walk on 8/11 with her baseline TOWNSEND but has since worsened. States she has been wheelchair bound since and experiences dizziness and SOB when sitting up or transferring in and out of wheelchair. Patient does complains of headache today due to not eating, denies taking an AM glucose. Reports dysphagia and a cough that was worsened since her surgery 7/2 and is followed by SLP. Patient denies productive cough. Denies acute changes in urination or stool production. Denies n/f/v/c. Roommate reports that since her surgery she has episodes of being less responsive, but does not feel these are LOC.       Today patient did not have anything to eat for lab draws and did not take any of her medications. Patient experienced LOC during clinic visit with Dr. Wolf during  ultrasound measurement of JVP. Patient is unsure how long she was unconscious for.       Patient reports she lives with her friend. States she quit smoking >40 years ago and denies alcohol use.       Recent admission(s)   6/29/18-7/13/18 admit from ED for 3xCABG  7/13-7/23 in rehabilitation facility         Hospital Course     Mariel Ribeiro was admitted on 8/15/2018.  The following problems were addressed during her hospitalization:     # Dyspnea on exertion  Could be a result of progressive COPD, decompensated heart failure 2/2, obesity hypoventilation syndrome, excessive dynamic airway collapse, left pleural effusion. NT-BNP 8/15 is elevated at 1149, but has also been elevated at ~950 in that past. Echo 8/15 shows an EF of 40-45% reduced from 55-60% (7/20) however the IVC remains dilated with diameter of 2.6cm with both studies. Patient was diuresed 8/15 with 80mg of lasix and 500mg diuril. 8/16 patient had right heart cath with PCW 18, RA 13, PA 33, RV 15, CI 1.53, SVR 2444, /86. Patient thought to be nervous during catheterization and reason why elevated pressures. Patient at baseline weight and is not thought to be volume overloaded, however patient did report some improvement in SOB after diuresis.   - consider out patient PFTs  - consider out patient expiratory CT scan to exclude EDAC  - discharge with bumex 1mg in AM and 2mg in PM      # Syncope  Could be secondary to vaso vagal, orthostatic hypotension, hypoglycemia, arrhythmia. Less likely arrhythmia due to normal EKG today and previously without known diagnosis of arrhythmia. Possibly to be hypoglycemia due to patient fasting for lab draw today. Cannot exclude orthostatic hypotension. Possibly vaso vagal response due to concurrent JVP testing at time of syncopal episode. Patient did not experience further syncopal episodes during admission and telemetry did not show any significant events.   - consider tilt table testing as out patient       #  Left pleural effusion  Pleural effusion on cxr 8/15 has increased in size since last cxr 7/8. Trivial pericardial effusion observed on 8/15 echo. Could represent late onset effusion 2/2 to surgical trauma. Less likely to be postpericardiotomy syndrome as patient does not have signs of pericarditis at present time. More likely to be a result of CHF. Patient underwent thoracocentesis 8/16 however CAPS team was unable to tap effusion and recommended possible IR thoracocentesis.   - recommend follow up with PCP to monitor       # HTN  # CAD s/p 2x LAD stent 2011 and CABG 7/2/18  # HLD  Blood pressure stable during admission with hydralazine held.   - continue PTA aspirin 325 daily   - continue PTA atorvastatin 40mg daily   - continue PTA metoprolol 75mg BID  - discontinue PTA hydralazine 75mg TID  - discharge with bumex 1mg in AM and 2mg in PM      # Normocytic anemia, chronic   Hemoglobin 9.7 upon admission. Base line ~12. Patient treatment c/b hematoma in right leg. Last received transfusion 7/25. Patient received 2 doses of  IV iron sucrose 200U during admission.    - discharge with PO ferric gluconate tablets   - trend CBC daily          Pending Results      Unresulted Labs Ordered in the Past 30 Days of this Admission      No orders found for last 61 day(s).                Physical Exam      Temp:  [96.8  F (36  C)-98.2  F (36.8  C)] 96.8  F (36  C)  Heart Rate:  [54-85] 60  Resp:  [18-20] 18  BP: (115-161)/(55-77) 128/60  SpO2:  [89 %-100 %] 100 %        Vitals:     08/15/18 1236 08/15/18 1800 08/16/18 0135   Weight: 136.7 kg (301 lb 6.4 oz) 137.7 kg (303 lb 9.6 oz) 135.5 kg (298 lb 11.2 oz)         The discharge plan was discussed with the patient and Dr. Crystal HUTSON.      Rafi Huizar  PGY 1   August 16, 2018  10:00pm               Problems taking medications regularly:  None       Medication changes since discharge: None       Problems adhering to non-medication therapy:  None    Summary of  hospitalization:  [facility] discharge summary reviewed  Diagnostic Tests/Treatments reviewed.  Follow up needed: cardiology, [facility] clinic  Other Healthcare Providers Involved in Patient s Care:         Homecare  She is discouraged about the number of home care providers coming in, doesn't want so many  Update since discharge: improving, feels breathing is improving; monitoring weight daily    Post Discharge Medication Reconciliation: discharge medications reconciled, continue medications without change.  Plan of care communicated with patient     Coding guidelines for this visit:  Type of Medical   Decision Making Face-to-Face Visit       within 7 Days of discharge Face-to-Face Visit        within 14 days of discharge   Moderate Complexity [id number]   High Complexity [id number]                  Problem list and histories reviewed & adjusted, as indicated.  Additional history: as documented        Reviewed and updated as needed this visit by clinical staff  Allergies  Meds       Reviewed and updated as needed this visit by Provider         ROS:  CONSTITUTIONAL: NEGATIVE for fever, chills, change in weight  INTEGUMENTARY/SKIN: groin rash  ENT/MOUTH: NEGATIVE for ear, mouth and throat problems  RESP:shortness of breath is improving  CV: NEGATIVE for chest pain, palpitations or peripheral edema  GI: NEGATIVE for nausea, abdominal pain, heartburn, or change in bowel habits  : vaginal itching  MUSCULOSKELETAL: chronic knee pain  NEURO: neuropathy feet  HEME/ALLERGY/IMMUNE: NEGATIVE for bleeding problems  PSYCHIATRIC: NEGATIVE for changes in mood or affect    OBJECTIVE:     BP (P) 121/68  Pulse 70  Temp 97  F (36.1  C) (Oral)  Wt 299 lb (135.6 kg)  SpO2 96%  BMI 48.26 kg/m2  Body mass index is 48.26 kg/(m^2).  GENERAL: healthy, alert and no distress  RESP: decreased breath sounds bilateral bases  CV: regular rate and rhythm, normal S1 S2, no S3 or S4, no murmur, click or rub, no peripheral edema and  peripheral pulses strong  SKIN: inguinal erythema  PSYCH: mentation appears normal, affect normal/bright        ASSESSMENT/PLAN:             1. S/P CABG x 3  She has [facility] clinic appointment for tomorrow.  Schedule cardiology follow up.    2. Chronic obstructive pulmonary disease, unspecified COPD type (H)  See PCP next week.  - albuterol (PROAIR HFA) 108 (90 Base) MCG/ACT inhaler; Inhale 1-2 puffs into the lungs every 4 hours as needed for wheezing  Dispense: 1 Inhaler; Refill: PRN    3. Hypokalemia  Recheck potassium.   - Basic metabolic panel    4. Cutaneous candidiasis  Refills given.     5. Iron deficiency anemia, unspecified iron deficiency anemia type  Improving.   - CBC with platelets    6. Candidiasis of vulva and vagina  Discussed the use and indication of this medication as well as potential side effects.   - fluconazole (DIFLUCAN) 150 MG tablet; Take 1 tablet (150 mg) by mouth every 3 days  Dispense: 4 tablet; Refill: 0    7. Mononeuritis  Refills given.   - ketamine, KETALAR, 5%-gabapentin, NEURONTIN, 8% 5%-8% GEL topical PLO cream; Apply 30 g topically 3 times daily  Dispense: 30 g; Refill: 0    8. Other specified hypothyroidism  Would like WINSOME.   - levothyroxine (SYNTHROID/LEVOTHROID) 150 MCG tablet; Take 1 tablet (150 mcg) by mouth daily  Dispense: 30 tablet; Refill: 0        Rosina Blount NP  [facility]

## 2018-08-24 ENCOUNTER — TELEPHONE (OUTPATIENT)
Dept: FAMILY MEDICINE | Facility: CLINIC | Age: 73
End: 2018-08-24

## 2018-08-24 DIAGNOSIS — I50.32 CHRONIC DIASTOLIC HEART FAILURE (H): Primary | Chronic | ICD-10-CM

## 2018-08-24 LAB
ANION GAP SERPL CALCULATED.3IONS-SCNC: 9 MMOL/L (ref 3–14)
BUN SERPL-MCNC: 32 MG/DL (ref 7–30)
CALCIUM SERPL-MCNC: 9.3 MG/DL (ref 8.5–10.1)
CHLORIDE SERPL-SCNC: 106 MMOL/L (ref 94–109)
CO2 SERPL-SCNC: 28 MMOL/L (ref 20–32)
CREAT SERPL-MCNC: 1.42 MG/DL (ref 0.52–1.04)
GFR SERPL CREATININE-BSD FRML MDRD: 36 ML/MIN/1.7M2
GLUCOSE SERPL-MCNC: 62 MG/DL (ref 70–99)
POTASSIUM SERPL-SCNC: 3.8 MMOL/L (ref 3.4–5.3)
SODIUM SERPL-SCNC: 143 MMOL/L (ref 133–144)

## 2018-08-24 RX ORDER — TRAZODONE HYDROCHLORIDE 50 MG/1
150 TABLET, FILM COATED ORAL AT BEDTIME
Qty: 270 TABLET | Refills: 1 | Status: CANCELLED | OUTPATIENT
Start: 2018-08-24

## 2018-08-24 ASSESSMENT — PATIENT HEALTH QUESTIONNAIRE - PHQ9: SUM OF ALL RESPONSES TO PHQ QUESTIONS 1-9: 13

## 2018-08-24 ASSESSMENT — ANXIETY QUESTIONNAIRES: GAD7 TOTAL SCORE: 0

## 2018-08-24 NOTE — TELEPHONE ENCOUNTER
I called and left message for Joann that it was ok to continue speech therapy for 4 additional visits.  Hetal Mane RN

## 2018-08-24 NOTE — TELEPHONE ENCOUNTER
Joann called from  home care requesting ok to continue speech therapy for 4 additional visits     Ok to leave message

## 2018-08-27 ENCOUNTER — TELEPHONE (OUTPATIENT)
Dept: FAMILY MEDICINE | Facility: CLINIC | Age: 73
End: 2018-08-27

## 2018-08-27 NOTE — TELEPHONE ENCOUNTER
Per Marysol at [facility]'s they faxed a CMN form and received it back stating that it is too early for a refill .  The pharmacy is not looking to refill at this point but does need the CMN from completed.

## 2018-08-27 NOTE — TELEPHONE ENCOUNTER
[facility] review and see if you can find this form.  We may need to direct it to the covering provider to look at.  Monica Gonzalez RN

## 2018-08-27 NOTE — TELEPHONE ENCOUNTER
I located fax and placed in [facility]'s forms folder. Not sure what it is requiring. Otherwise Dr. Ramos is DOD tomorrow if it can wait.     Sofía Bahena MA

## 2018-08-28 ENCOUNTER — RADIANT APPOINTMENT (OUTPATIENT)
Dept: GENERAL RADIOLOGY | Facility: CLINIC | Age: 73
End: 2018-08-28
Payer: MEDICARE

## 2018-08-28 ENCOUNTER — OFFICE VISIT (OUTPATIENT)
Dept: CARDIOLOGY | Facility: CLINIC | Age: 73
End: 2018-08-28
Attending: NURSE PRACTITIONER
Payer: MEDICARE

## 2018-08-28 VITALS
HEART RATE: 77 BPM | BODY MASS INDEX: 47.09 KG/M2 | HEIGHT: 66 IN | OXYGEN SATURATION: 96 % | SYSTOLIC BLOOD PRESSURE: 130 MMHG | DIASTOLIC BLOOD PRESSURE: 75 MMHG | WEIGHT: 293 LBS

## 2018-08-28 DIAGNOSIS — E87.6 HYPOKALEMIA: ICD-10-CM

## 2018-08-28 DIAGNOSIS — R55 SYNCOPE, UNSPECIFIED SYNCOPE TYPE: ICD-10-CM

## 2018-08-28 DIAGNOSIS — I50.32 CHRONIC DIASTOLIC HEART FAILURE (H): Chronic | ICD-10-CM

## 2018-08-28 DIAGNOSIS — R06.09 DOE (DYSPNEA ON EXERTION): ICD-10-CM

## 2018-08-28 DIAGNOSIS — J90 PLEURAL EFFUSION, LEFT: Primary | ICD-10-CM

## 2018-08-28 DIAGNOSIS — J90 PLEURAL EFFUSION, LEFT: ICD-10-CM

## 2018-08-28 DIAGNOSIS — R42 LIGHTHEADEDNESS: ICD-10-CM

## 2018-08-28 DIAGNOSIS — E79.0 HYPERURICEMIA: ICD-10-CM

## 2018-08-28 LAB
ANION GAP SERPL CALCULATED.3IONS-SCNC: 8 MMOL/L (ref 3–14)
BUN SERPL-MCNC: 28 MG/DL (ref 7–30)
CALCIUM SERPL-MCNC: 9.2 MG/DL (ref 8.5–10.1)
CHLORIDE SERPL-SCNC: 105 MMOL/L (ref 94–109)
CO2 SERPL-SCNC: 27 MMOL/L (ref 20–32)
CREAT SERPL-MCNC: 1.41 MG/DL (ref 0.52–1.04)
GFR SERPL CREATININE-BSD FRML MDRD: 37 ML/MIN/1.7M2
GLUCOSE SERPL-MCNC: 172 MG/DL (ref 70–99)
NT-PROBNP SERPL-MCNC: 617 PG/ML (ref 0–125)
POTASSIUM SERPL-SCNC: 4 MMOL/L (ref 3.4–5.3)
SODIUM SERPL-SCNC: 140 MMOL/L (ref 133–144)
TSH SERPL DL<=0.005 MIU/L-ACNC: 2.97 MU/L (ref 0.4–4)
URATE SERPL-MCNC: 6.8 MG/DL (ref 2.6–6)

## 2018-08-28 PROCEDURE — 83880 ASSAY OF NATRIURETIC PEPTIDE: CPT | Performed by: FAMILY MEDICINE

## 2018-08-28 PROCEDURE — 84550 ASSAY OF BLOOD/URIC ACID: CPT | Performed by: FAMILY MEDICINE

## 2018-08-28 PROCEDURE — 80048 BASIC METABOLIC PNL TOTAL CA: CPT | Performed by: FAMILY MEDICINE

## 2018-08-28 PROCEDURE — 0298T ZZC EXT ECG > 48HR TO 21 DAY REVIEW AND INTERPRETATN: CPT | Performed by: INTERNAL MEDICINE

## 2018-08-28 PROCEDURE — 93010 ELECTROCARDIOGRAM REPORT: CPT | Mod: ZP | Performed by: INTERNAL MEDICINE

## 2018-08-28 PROCEDURE — 84443 ASSAY THYROID STIM HORMONE: CPT | Performed by: FAMILY MEDICINE

## 2018-08-28 PROCEDURE — G0463 HOSPITAL OUTPT CLINIC VISIT: HCPCS | Mod: ZF

## 2018-08-28 PROCEDURE — 93005 ELECTROCARDIOGRAM TRACING: CPT | Mod: XU

## 2018-08-28 PROCEDURE — 36415 COLL VENOUS BLD VENIPUNCTURE: CPT | Performed by: FAMILY MEDICINE

## 2018-08-28 PROCEDURE — 99215 OFFICE O/P EST HI 40 MIN: CPT | Mod: ZP | Performed by: NURSE PRACTITIONER

## 2018-08-28 PROCEDURE — 0296T ZIO PATCH HOLTER: CPT | Mod: ZF | Performed by: NURSE PRACTITIONER

## 2018-08-28 RX ORDER — POTASSIUM CHLORIDE 750 MG/1
40 TABLET, EXTENDED RELEASE ORAL 3 TIMES DAILY
Qty: 120 TABLET | Refills: 1 | COMMUNITY
Start: 2018-08-28 | End: 2018-09-06 | Stop reason: ALTCHOICE

## 2018-08-28 RX ORDER — BUMETANIDE 2 MG/1
2 TABLET ORAL 2 TIMES DAILY
Qty: 60 TABLET | Refills: 1 | Status: SHIPPED | OUTPATIENT
Start: 2018-08-28 | End: 2018-10-09

## 2018-08-28 RX ORDER — LOSARTAN POTASSIUM 25 MG/1
25 TABLET ORAL DAILY
Qty: 15 TABLET | Refills: 1 | COMMUNITY
Start: 2018-08-28 | End: 2018-09-06 | Stop reason: DRUGHIGH

## 2018-08-28 ASSESSMENT — PAIN SCALES - GENERAL: PAINLEVEL: MODERATE PAIN (5)
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
## 2018-08-28 NOTE — NURSING NOTE
Chief Complaint   Patient presents with     Follow Up For      New CORE; 73 year old with chronic diastolic heart failure and recent hospitalization presents for initial visit with labs prior.      Medications were reviewed and vitals performed.   Ai Norton MA

## 2018-08-28 NOTE — NURSING NOTE
Per Cathi Vaughn, patient to have 14 days ziopatch monitor placed.  Diagnosis: syncope  Monitor placed: Yes  Patient Instructed: Yes  Patient verbalized understanding: Yes  Holter # [id number]  Placed by; Hallie Fraga CMA
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
## 2018-08-28 NOTE — PATIENT INSTRUCTIONS
"You were seen today in the Cardiovascular Clinic at the [facility].     Cardiology Providers you saw during your visit: Cathi CORNEJO CNP      Follow up and medication changes:    1. EKG done in clinic today. Go down to get Chest xray and then come back upstairs for Ziopatch placement.   2. Increase Losartan to 25 mg daily.   3. Increase Bumex to 2 mg two times a day  4. Increase Potassium to 40 mEq three times a day.   5. Return to CORE Clinic next Thursday the  at 1:30 with labs at 1:00  6. We will call you with lab results.   7. Referral to neurology clinic placed.             Please limit your fluid intake to 2 L (68 ounces) daily.  2 Liters a day = 8.5 cups, or 68 ounces.  Please limit your salt intake to 2 grams a day or less.     If you gain 2# in 24 hours or 5# in one week call Rosina Mays RN so we can adjust your medications as needed over the phone.     Please feel free to call me with any questions or concerns.       Rosina Mays RN CHFN  [facility]  Cardiology Care Coordinator-Heart Failure Clinic     Questions and [phone].   First press #1 for the University and then press #3 for \"Medical Questions\" to reach us Cardiology Nurses.      On Call Cardiologist for after hours or on weekends: [phone]   option #4 and ask to speak to the on-call Cardiologist. Inform them you are a CORE/heart failure patient at the [facility].           If you need a medication refill please contact your pharmacy.  Please allow 3 business days for your refill to be completed.  _______________________________________________________  [facility]IN[facility] Cardiomyopathy, Optimization, Rehabilitation, Education   The [facility].R.E[facility] CLINIC is a heart failure specialty clinic within the [facility] where you will work with specialized nurse practitioners dedicated to helping patients with heart failure carefully adjust medications, " receive education, and learn who and when to call if symptoms develop. They specialize in helping you better understand your condition, slow the progression of your disease, improve the length and quality of your life, help you detect future heart problems before they become life threatening, and avoid hospitalizations.  As always, thank you for trusting us with your health care needs!

## 2018-08-28 NOTE — MR AVS SNAPSHOT
"              After Visit Summary   2018    Mariel Ribeiro    MRN: [id number]           Patient Information     Date Of Birth          1945        Visit Information        Provider Department      2018 2:00 PM Cathi Vaughn APRN CNP [facility]        Today's Diagnoses     Pleural effusion, left    -  1    Lightheadedness        Chronic diastolic heart failure (H)        TOWNSEND (dyspnea on exertion)        Hypokalemia        Syncope, unspecified syncope type          Care Instructions    You were seen today in the Cardiovascular Clinic at the [facility].     Cardiology Providers you saw during your visit: Cathi CORNEJO CNP      Follow up and medication changes:    1. EKG done in clinic today. Go down to get Chest xray and then come back upstairs for Ziopatch placement.   2. Increase Losartan to 25 mg daily.   3. Increase Bumex to 2 mg two times a day  4. Increase Potassium to 40 mEq three times a day.   5. Return to [facility] Clinic next Thursday the  at 1:30 with labs at 1:00  6. We will call you with lab results.   7. Referral to neurology clinic placed.             Please limit your fluid intake to 2 L (68 ounces) daily.  2 Liters a day = 8.5 cups, or 68 ounces.  Please limit your salt intake to 2 grams a day or less.     If you gain 2# in 24 hours or 5# in one week call Rosina Mays RN so we can adjust your medications as needed over the phone.     Please feel free to call me with any questions or concerns.       Rosina Mays RN [facility]  [facility]  Cardiology Care Coordinator-Heart Failure Clinic     Questions and [phone].   First press #1 for the [employer] and then press #3 for \"Medical Questions\" to reach us Cardiology Nurses.      On Call Cardiologist for after hours or on weekends: [phone]   option #4 and ask to speak to the on-call Cardiologist. Inform them you are a CORE/heart failure patient at the " [facility].           If you need a medication refill please contact your pharmacy.  Please allow 3 business days for your refill to be completed.  _______________________________________________________  C.O.R.E. CLINIC Cardiomyopathy, Optimization, Rehabilitation, Education   The C.O.R.E. CLINIC is a heart failure specialty clinic within the [facility] where you will work with specialized nurse practitioners dedicated to helping patients with heart failure carefully adjust medications, receive education, and learn who and when to call if symptoms develop. They specialize in helping you better understand your condition, slow the progression of your disease, improve the length and quality of your life, help you detect future heart problems before they become life threatening, and avoid hospitalizations.  As always, thank you for trusting us with your health care needs!          Follow-ups after your visit        Additional Services     NEUROLOGY ADULT REFERRAL       Your provider has referred you for the following:   Consult at [facility] [phone]   [email or web address]  [facility]: Neurology [facility] [phone]   [email or web address]  Fibromyalgia with current syncope on Lyrica     Please be aware that coverage of these services is subject to the terms and limitations of your health insurance plan.  Call member services at your health plan with any benefit or coverage questions.      Please bring the following with you to your appointment:    (1) Any X-Rays, CTs or MRIs which have been performed.  Contact the facility where they were done to arrange for  prior to your scheduled appointment.    (2) List of current medications  (3) This referral request   (4) Any documents/labs given to you for this referral            Follow-Up with [facility] Clinic       Starla Saez  at 1:30PM, labs at 1 PM                  Your next 10 appointments already scheduled     Sep 04, 2018 11:00 AM CDT   [facility]    [address] [place] [id number] [phone]           Bring a current list of meds and any records pertaining to this visit. For Physicals, please bring immunization records and any forms needing to be filled out. Please arrive 10 minutes early to complete paperwork.            Sep 06, 2018  1:00 PM CDT   Lab with UC LAB   [facility] ([facility])    [address]  1st Floor  [facility] [id number] [phone]            Sep 06, 2018  1:30 PM CDT   (Arrive by 1:15 PM)   CORE RETURN with Starla Saez NP   [facility] ([facility])    [address] [place] [address]  [address]  [facility] [id number] [phone]            Sep 20, 2018 11:00 AM CDT   XR VIDEO SPEECH EVALUATION WITH ESOPHAGRAM with UCXR2, UC GIGU RAD   [facility] ([facility])    [address] [place] [id number] [phone]           Please bring a list of your current medicines to your exam. (Include vitamins, minerals and over-the-counter medicines.) Leave your valuables at home.  Tell the doctor if there is a chance you could be pregnant.  Do not eat for 4 hours before the exam. Keep drinking clear liquids until 2 hours before the exam.  You may take pain medicine (with a sip of water) up to 4 hours before the exam.  Do not swallow any other medicines unless your doctor tells you to. Talk to your doctor to be sure it s safe to stop your medicines.  Please call the Imaging Department at your exam site with any questions.            Sep 20, 2018 11:00 AM CDT   Video Swallow with GERARD Topete   [facility] ([facility])    [address] [place] [address]  Se  4th Floor  [facility] [address] [phone]           Please check in for this visit in Imaging on the 1st floor.            Oct 18, 2018  1:30 PM CDT   (Arrive by 1:15 PM)   RETURN DIABETES with Odalis Medley PA-C   [facility] ([facility])    [address] [place] [address]  [address]rd [facility] [address]   [phone]            Jan 14, 2019  2:30 PM CST   (Arrive by 2:15 PM)   RETURN DIABETES with Keven Jaeger MD   [facility] ([facility])    [address] [place] [id number] [phone]              Future tests that were ordered for you today     Open Future Orders        Priority Expected Expires Ordered    Ziopatch Holter Monitor - Adult Routine  10/27/2018 8/28/2018    Follow-Up with [facility] Clinic Routine 9/6/2018 12/3/2018 8/28/2018            Who to contact     If you have questions or need follow up information about today's clinic visit or your schedule please contact [facility] directly at [phone].  Normal or non-critical lab and imaging results will be communicated to you by [employer], letter or phone within 4 business days after the clinic has received the results. If you do not hear from us within 7 days, please contact the clinic through [employer] or phone. If you have a critical or abnormal lab result, we will notify you by phone as soon as possible.  Submit refill requests through [email or web address] or call your pharmacy and they will forward the refill request to us. Please allow 3 business days for your refill to be completed.          Additional Information About Your Visit        DelaGethart Information     [email or web address] gives you secure access to your electronic health record. If you see a primary care provider, you can also send messages to your care team and make appointments. If you have questions, please call your primary care clinic.  If you do not have a primary care  "provider, please call [phone] and they will assist you.        Care EveryWhere ID     This is your Care EveryWhere ID. This could be used by other organizations to access your [facility] medical records  [id number]        Your Vitals Were     Pulse Height Pulse Oximetry BMI (Body Mass Index)          77 1.676 m (5' 6\") 96% 48.74 kg/m2         Blood Pressure from Last 3 Encounters:   08/28/18 130/75   08/23/18 (P) 121/68   08/16/18 130/52    Weight from Last 3 Encounters:   08/28/18 137 kg (302 lb)   08/23/18 135.6 kg (299 lb)   08/16/18 135.5 kg (298 lb 11.2 oz)              We Performed the Following     EKG 12-lead complete w/read - Clinics     NEUROLOGY ADULT REFERRAL     Orthostatic blood pressure and pulse          Today's Medication Changes          These changes are accurate as of 8/28/18  4:57 PM.  If you have any questions, ask your nurse or doctor.               These medicines have changed or have updated prescriptions.        Dose/Directions    acetaminophen 325 MG tablet   Commonly known as:  TYLENOL   This may have changed:  when to take this   Used for:  S/P CABG x 3, Acute post-operative pain        Dose:  650 mg   Take 2 tablets (650 mg) by mouth every 4 hours as needed for mild pain   Quantity:  100 tablet   Refills:  0       bumetanide 2 MG tablet   Commonly known as:  BUMEX   This may have changed:    - when to take this  - Another medication with the same name was removed. Continue taking this medication, and follow the directions you see here.   Used for:  Chronic diastolic heart failure (H)   Changed by:  Cathi Vaughn APRN CNP        Dose:  2 mg   Take 1 tablet (2 mg) by mouth 2 times daily   Quantity:  60 tablet   Refills:  1       LIFESCAN FINEPOINT LANCETS Misc   This may have changed:  additional instructions   Used for:  Type 2 diabetes mellitus with diabetic polyneuropathy, without long-term current use of insulin (H)        Use to test blood sugars 2 times daily or as " directed.   Quantity:  100 each   Refills:  prn       losartan 25 MG tablet   Commonly known as:  COZAAR   This may have changed:  how much to take   Used for:  Chronic diastolic heart failure (H)   Changed by:  Cathi Vaughn APRN CNP        Dose:  25 mg   Take 1 tablet (25 mg) by mouth daily   Quantity:  15 tablet   Refills:  1       ONE TOUCH ULTRA (DEVICES) Misc   This may have changed:  See the new instructions.   Used for:  Type II or unspecified type diabetes mellitus without mention of complication, not stated as uncontrolled        test blood sugar BID   Quantity:  1   Refills:  0       potassium chloride SA 10 MEQ CR tablet   Commonly known as:  K-DUR/KLOR-CON M   This may have changed:  when to take this   Used for:  Hypokalemia   Changed by:  Cathi Vaughn APRN CNP        Dose:  40 mEq   Take 4 tablets (40 mEq) by mouth 3 times daily   Quantity:  120 tablet   Refills:  1            Where to get your medicines      These medications were sent to [id number] - [place], [place] - [address] AT [facility] [address]  [address], [place] [address]    Hours:  24-hours Phone:  [phone]     bumetanide 2 MG tablet                Primary Care Provider Office Phone # Fax #    Rafaela William [phone] [address] [place] [address]        Equal Access to Services     [facility] AH: Hadii aad ku hadasho Soomaali, waaxda luqadaha, qaybta kaalmada adeegyada, gopal vallejo haychris pena . So [facility] [phone].    ATENCIÓN: Si habla español, tiene a gillespie disposición servicios gratuitos de asistencia lingüística. Hebert al [phone].    We comply with applicable federal civil rights laws and [place] laws. We do not discriminate on the basis of race, color, national origin, age, disability, sex, sexual orientation, or gender identity.            Thank you!     Thank you for choosing [facility]  for your  care. Our goal is always to provide you with excellent care. Hearing back from our patients is one way we can continue to improve our services. Please take a few minutes to complete the written survey that you may receive in the mail after your visit with us. Thank you!             Your Updated Medication List - Protect others around you: Learn how to safely use, store and throw away your medicines at www.disposemymeds.org.          This list is accurate as of 8/28/18  4:57 PM.  Always use your most recent med list.                   Brand Name Dispense Instructions for use Diagnosis    acetaminophen 325 MG tablet    TYLENOL    100 tablet    Take 2 tablets (650 mg) by mouth every 4 hours as needed for mild pain    S/P CABG x 3, Acute post-operative pain       albuterol 108 (90 Base) MCG/ACT inhaler    PROAIR HFA    1 Inhaler    Inhale 1-2 puffs into the lungs every 4 hours as needed for wheezing    Chronic obstructive pulmonary disease, unspecified COPD type (H)       aspirin 325 MG EC tablet     30 tablet    Take 1 tablet by mouth daily.        atorvastatin 40 MG tablet    LIPITOR    30 tablet    Take 1 tablet (40 mg) by mouth daily    Atherosclerosis of native coronary artery without angina pectoris, unspecified whether native or transplanted heart       B-D U/F 31G X 5 MM   Generic drug:  insulin pen needle     100 each    USE FOR INSULIN INJECTION    Type 2 diabetes mellitus with diabetic polyneuropathy, without long-term current use of insulin (H)       blood glucose monitoring test strip    no brand specified    200 each    1 strip by In Vitro route 2 times daily Strips per patient's preference    Type 2 diabetes mellitus with diabetic polyneuropathy, without long-term current use of insulin (H)       bumetanide 2 MG tablet    BUMEX    60 tablet    Take 1 tablet (2 mg) by mouth 2 times daily    Chronic diastolic heart failure (H)       clotrimazole 1 % cream    LOTRIMIN    60 g    Apply topically 2 times daily     Dermatitis       EPINEPHrine 0.3 MG/0.3ML injection 2-pack    EPIPEN/ADRENACLICK/or ANY BX GENERIC EQUIV    0.6 mL    Inject 0.3 mLs (0.3 mg) into the muscle once as needed for anaphylaxis    Allergic reaction, subsequent encounter       escitalopram 20 MG tablet    LEXAPRO    30 tablet    Take 1 tablet (20 mg) by mouth every morning    Recurrent major depressive disorder, in partial remission (H)       febuxostat 40 MG Tabs tablet    ULORIC    30 tablet    Take 1 tablet (40 mg) by mouth every evening    Vitamin D deficiency       ferrous gluconate 324 (38 Fe) MG tablet    FERGON    12 tablet    Take 1 tablet (324 mg) by mouth Every Mon, Wed, Fri Morning    Chronic diastolic heart failure (H), Iron deficiency anemia secondary to inadequate dietary iron intake       fluconazole 150 MG tablet    DIFLUCAN    4 tablet    Take 1 tablet (150 mg) by mouth every 3 days    Candidiasis of vulva and vagina       guaiFENesin 600 MG 12 hr tablet    MUCINEX     Take 1 tablet (600 mg) by mouth 2 times daily        insulin isophane human 100 UNIT/ML injection    HumuLIN N PEN    10.5 mL    Inject 35 Units Subcutaneous every 24 hours    Type 2 diabetes mellitus with diabetic chronic kidney disease, unspecified CKD stage, unspecified long term insulin use status (H)       insulin syringes (disposable) U-100 1 ML Misc     100 each    1 each 5 times daily    Type 2 diabetes mellitus with chronic kidney disease, without long-term current use of insulin, unspecified CKD stage (H)       ketamine (KETALAR) 5%-gabapentin (NEURONTIN) 8% 5%-8% Gel topical PLO cream     30 g    Apply 30 g topically 3 times daily    Mononeuritis       levothyroxine 150 MCG tablet    SYNTHROID/LEVOTHROID    30 tablet    Take 1 tablet (150 mcg) by mouth daily    Other specified hypothyroidism       voxapp LANCETS Misc     100 each    Use to test blood sugars 2 times daily or as directed.    Type 2 diabetes mellitus with diabetic polyneuropathy,  without long-term current use of insulin (H)       losartan 25 MG tablet    COZAAR    15 tablet    Take 1 tablet (25 mg) by mouth daily    Chronic diastolic heart failure (H)       Metoprolol Tartrate 75 MG Tabs     180 tablet    Take 75 mg by mouth 2 times daily    Atherosclerosis of native coronary artery without angina pectoris, unspecified whether native or transplanted heart       miconazole 2 % powder    MICATIN; MICRO GUARD    71 g    Apply topically 2 times daily    Dermatitis       nitroGLYcerin 0.4 MG sublingual tablet    NITROSTAT    25 tablet    For chest pain place 1 tablet under the tongue every 5 minutes for 3 doses. If symptoms persist 5 minutes after 1st dose call 911.    Atherosclerosis of native coronary artery without angina pectoris, unspecified whether native or transplanted heart       ONE TOUCH ULTRA (DEVICES) Misc     1    test blood sugar BID    Type II or unspecified type diabetes mellitus without mention of complication, not stated as uncontrolled       polyethylene glycol Packet    MIRALAX/GLYCOLAX    7 packet    Take 17 g by mouth daily    Atherosclerosis of native coronary artery without angina pectoris, unspecified whether native or transplanted heart       potassium chloride SA 10 MEQ CR tablet    K-DUR/KLOR-CON M    120 tablet    Take 4 tablets (40 mEq) by mouth 3 times daily    Hypokalemia       pregabalin 100 MG capsule    LYRICA    90 capsule    Take 1 capsule (100 mg) by mouth 2 times daily    Pain in joint of left shoulder       repaglinide 1 MG tablet    PRANDIN    90 tablet    Take 1 tablet (1 mg) by mouth 3 times daily (before meals)    Type 2 diabetes mellitus with diabetic chronic kidney disease, unspecified CKD stage, unspecified long term insulin use status (H)       senna-docusate 8.6-50 MG per tablet    SENOKOT-S;PERICOLACE    30 tablet    Take 1-2 tablets by mouth 2 times daily as needed for constipation    Atherosclerosis of native coronary artery without angina  pectoris, unspecified whether native or transplanted heart       TIZANIDINE HCL PO      Take 4 mg by mouth At Bedtime        traZODone 50 MG tablet    DESYREL    270 tablet    Take 3 tablets (150 mg) by mouth At Bedtime    Recurrent major depressive disorder, in partial remission (H)

## 2018-08-29 ENCOUNTER — MYC MEDICAL ADVICE (OUTPATIENT)
Dept: FAMILY MEDICINE | Facility: CLINIC | Age: 73
End: 2018-08-29

## 2018-08-29 ENCOUNTER — CARE COORDINATION (OUTPATIENT)
Dept: CARDIOLOGY | Facility: CLINIC | Age: 73
End: 2018-08-29

## 2018-08-29 LAB — INTERPRETATION ECG - MUSE: NORMAL

## 2018-08-29 NOTE — PROGRESS NOTES
Talked to Mariel to communicate lab results and chest xray results. Roommate Odalis also on the phone wanting to clarify potassium dosage. She states Mariel had been only taking Potassium 40 mEq daily and thinks its a big increase to 40 mEq TID.   Talked with Cathi Vaughn NP, who clarified that in clinic yesterday Mariel had said she was on 40 mEq BID already. Called mariel back and left message to clarify dosage before visit yesterday.   Rosina Mays RN

## 2018-08-29 NOTE — TELEPHONE ENCOUNTER
Dr. William-Please review and advise.    Writer responded as per below.      Thank you!  SONIA RobertsN, RN

## 2018-08-30 NOTE — PROGRESS NOTES
Called Mariel back. She states she had been taking her Potassium 2 times a day. Roommate Odalis not home at this time, but Mariel said she discussed this with Odalis after our conversation yesterday and told Odalis she had been taking Potassium 2 times a day already. Told her if this was the case then Cathi Vaughn does want her to increase to 40 mEq three times per day as instructed in clinic. If Odalis has any questions she is welcome to call. Patient verbalizes understanding and agrees with plan of care. JODY Jacob RN

## 2018-08-31 ENCOUNTER — PATIENT OUTREACH (OUTPATIENT)
Dept: CARE COORDINATION | Facility: CLINIC | Age: 73
End: 2018-08-31

## 2018-08-31 NOTE — PROGRESS NOTES
Clinic Care Coordination Contact    Situation: Patient chart reviewed by care coordinator.    Background:  [facility] admission 8/15-8/16/2018-  Syncope    Coronary atherosclerosis    Chronic diastolic heart failure (H)    Hypothyroidism    Chronic airway obstruction/ COPD    Obstructive sleep apnea    Hypertension goal BP (blood pressure) < 130/80    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)    Hyperlipidemia with target LDL less than 70    Hypokalemia    Fatty liver disease, nonalcoholic    S/P CABG x 3    Type 2 diabetes mellitus with stage 3 chronic kidney disease, with long-term current use of insulin (H)    (HFpEF) heart failure with preserved ejection fraction (H)       Assessment: 8/20/2018 Home care order below:    ORDER Continued home PT 1w1, 2w3 for LE strengthening, improved activity tolerance, transfer training, gait training, and fall prevention.    Plan/Recommendations:CC will follow up when discharged from home care     Senia Durán RN / Clinical Care Coordinator     [facility]   [email or web address]  Office :  [phone] / Fax :  [phone]

## 2018-09-02 ENCOUNTER — PRE VISIT (OUTPATIENT)
Dept: CARDIOLOGY | Facility: CLINIC | Age: 73
End: 2018-09-02

## 2018-09-02 DIAGNOSIS — I50.30 (HFPEF) HEART FAILURE WITH PRESERVED EJECTION FRACTION (H): Primary | ICD-10-CM

## 2018-09-04 NOTE — TELEPHONE ENCOUNTER
She's had one with Hallie Blount and cardiologist so she's good to go    Vibha Carvajal R.N. (covering for Senia Durán RN CC)  Clinic Care Coordinator  [facility]  [phone]

## 2018-09-05 ENCOUNTER — DOCUMENTATION ONLY (OUTPATIENT)
Dept: CARE COORDINATION | Facility: CLINIC | Age: 73
End: 2018-09-05

## 2018-09-05 NOTE — PROGRESS NOTES
Dr. William,    Patient had a fall at home yesterday in the shower. She was standing and was very fatigued, and her legs gave out on her. Odalis was with her, and they had a neighbor come over to help her up. She denies head injury or LOC. Pt does endorse increased back pain to 10+ she said. She is seeing her chiropractor today.     Rossi Lundberg, [email or web address]  [phone]

## 2018-09-05 NOTE — PROGRESS NOTES
Ok.    If pain is severe/not improving, she should come in for x-ray.  Thanks-Rafaela William MD

## 2018-09-06 ENCOUNTER — OFFICE VISIT (OUTPATIENT)
Dept: CARDIOLOGY | Facility: CLINIC | Age: 73
End: 2018-09-06
Attending: NURSE PRACTITIONER
Payer: MEDICARE

## 2018-09-06 VITALS
OXYGEN SATURATION: 94 % | DIASTOLIC BLOOD PRESSURE: 58 MMHG | BODY MASS INDEX: 47.09 KG/M2 | HEIGHT: 66 IN | SYSTOLIC BLOOD PRESSURE: 88 MMHG | HEART RATE: 68 BPM | WEIGHT: 293 LBS

## 2018-09-06 DIAGNOSIS — I50.30 (HFPEF) HEART FAILURE WITH PRESERVED EJECTION FRACTION (H): ICD-10-CM

## 2018-09-06 DIAGNOSIS — E87.6 HYPOKALEMIA: ICD-10-CM

## 2018-09-06 DIAGNOSIS — I50.32 CHRONIC DIASTOLIC HEART FAILURE (H): Chronic | ICD-10-CM

## 2018-09-06 LAB
ANION GAP SERPL CALCULATED.3IONS-SCNC: 5 MMOL/L (ref 3–14)
BUN SERPL-MCNC: 34 MG/DL (ref 7–30)
CALCIUM SERPL-MCNC: 10 MG/DL (ref 8.5–10.1)
CHLORIDE SERPL-SCNC: 101 MMOL/L (ref 94–109)
CO2 SERPL-SCNC: 33 MMOL/L (ref 20–32)
CREAT SERPL-MCNC: 1.59 MG/DL (ref 0.52–1.04)
GFR SERPL CREATININE-BSD FRML MDRD: 32 ML/MIN/1.7M2
GLUCOSE SERPL-MCNC: 104 MG/DL (ref 70–99)
POTASSIUM SERPL-SCNC: 3.6 MMOL/L (ref 3.4–5.3)
SODIUM SERPL-SCNC: 139 MMOL/L (ref 133–144)

## 2018-09-06 PROCEDURE — G0463 HOSPITAL OUTPT CLINIC VISIT: HCPCS | Mod: ZF

## 2018-09-06 PROCEDURE — 36415 COLL VENOUS BLD VENIPUNCTURE: CPT | Performed by: NURSE PRACTITIONER

## 2018-09-06 PROCEDURE — 99214 OFFICE O/P EST MOD 30 MIN: CPT | Mod: ZP | Performed by: NURSE PRACTITIONER

## 2018-09-06 PROCEDURE — 80048 BASIC METABOLIC PNL TOTAL CA: CPT | Performed by: NURSE PRACTITIONER

## 2018-09-06 RX ORDER — LOSARTAN POTASSIUM 25 MG/1
25 TABLET ORAL DAILY
Qty: 30 TABLET | Refills: 3 | Status: SHIPPED | OUTPATIENT
Start: 2018-09-06 | End: 2018-12-12

## 2018-09-06 RX ORDER — POTASSIUM CHLORIDE 750 MG/1
40 TABLET, EXTENDED RELEASE ORAL 3 TIMES DAILY
Qty: 360 TABLET | Refills: 3 | Status: SHIPPED | OUTPATIENT
Start: 2018-09-06 | End: 2018-10-09

## 2018-09-06 ASSESSMENT — PAIN SCALES - GENERAL: PAINLEVEL: EXTREME PAIN (8)

## 2018-09-06 NOTE — PATIENT INSTRUCTIONS
"You were seen today in the Cardiovascular Clinic at the [facility].     Cardiology Providers you saw during your visit: Starla Saez NP     Follow up and medication changes:  1. Please reduce your Bumex by half. We believe you are taking Bumex 4 mg twice daily. If that's the case, reduce your Bumex to 2 mg twice a day    2. Please continue potassium three times daily    3. Please follow up with your primary care provider about your iron and gout    4. We've placed a referral to meet with a sleep specialist    5. We will contact your home care team to draw labs next week    6. Please return to CORE clinic in roughly 1 month or as needed    Results for ROSY PALMER (MRN [id number]) as of 2018 14:12   Ref. Range 2018 13:21   Sodium Latest Ref Range: 133 - 144 mmol/L 139   Potassium Latest Ref Range: 3.4 - 5.3 mmol/L 3.6   Chloride Latest Ref Range: 94 - 109 mmol/L 101   Carbon Dioxide Latest Ref Range: 20 - 32 mmol/L 33 (H)   Urea Nitrogen Latest Ref Range: 7 - 30 mg/dL 34 (H)   Creatinine Latest Ref Range: 0.52 - 1.04 mg/dL 1.59 (H)   GFR Estimate Latest Ref Range: >60 mL/min/1.7m2 32 (L)   GFR Estimate If Black Latest Ref Range: >60 mL/min/1.7m2 39 (L)   Calcium Latest Ref Range: 8.5 - 10.1 mg/dL 10.0   Anion Gap Latest Ref Range: 3 - 14 mmol/L 5   Glucose Latest Ref Range: 70 - 99 mg/dL 104 (H)           Please limit your fluid intake to 2 L (68 ounces) daily.  2 Liters a day = 8.5 cups, or 68 ounces.  Please limit your salt intake to 2 grams a day or less.     If you gain 2# in 24 hours or 5# in one week call Shayna Avery RN so we can adjust your medications as needed over the phone.     Please feel free to call me with any questions or concerns.       Shayna Avery RN  [facility]  Cardiology Care Coordinator-Heart Failure Clinic     Questions and [phone].   First press #1 for the [employer] and then press #3 for \"Medical Questions\" to reach us " Cardiology Nurses.      On Call Cardiologist for after hours or on weekends: [phone]   option #4 and ask to speak to the on-call Cardiologist. Inform them you are a CORE/heart failure patient at the [facility].        If you need a medication refill please contact your pharmacy.  Please allow 3 business days for your refill to be completed.  _______________________________________________________  C.O.R.E. CLINIC Cardiomyopathy, Optimization, Rehabilitation, Education   The C.O.R.E. CLINIC is a heart failure specialty clinic within the [facility] where you will work with specialized nurse practitioners dedicated to helping patients with heart failure carefully adjust medications, receive education, and learn who and when to call if symptoms develop. They specialize in helping you better understand your condition, slow the progression of your disease, improve the length and quality of your life, help you detect future heart problems before they become life threatening, and avoid hospitalizations.  As always, thank you for trusting us with your health care needs!
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
## 2018-09-06 NOTE — NURSING NOTE
Vitals completed successfully and medication reconciled. Divina Luciano MA  Chief Complaint   Patient presents with     Follow Up For     New CORE; 73 year old with chronic diastolic heart failure and recent hospitalization presents for follow-up with labs prior.
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
## 2018-09-06 NOTE — MR AVS SNAPSHOT
After Visit Summary   9/6/2018    Mariel Palmer    MRN: [id number]           Patient Information     Date Of Birth          1945        Visit Information        Provider Department      9/6/2018 1:30 PM Starla aSez NP [facility]        Today's Diagnoses     Chronic diastolic heart failure (H)        Hypokalemia          Care Instructions    You were seen today in the Cardiovascular Clinic at the [facility].     Cardiology Providers you saw during your visit: Starla Saez NP     Follow up and medication changes:  1. Please reduce your Bumex by half. We believe you are taking Bumex 4 mg twice daily. If that's the case, reduce your Bumex to 2 mg twice a day    2. Please continue potassium three times daily    3. Please follow up with your primary care provider about your iron and gout    4. We've placed a referral to meet with a sleep specialist    5. We will contact your home care team to draw labs next week    6. Please return to CORE clinic in roughly 1 month or as needed    Results for MARIEL PALMER (MRN [id number]) as of 9/6/2018 14:12   Ref. Range 9/6/2018 13:21   Sodium Latest Ref Range: 133 - 144 mmol/L 139   Potassium Latest Ref Range: 3.4 - 5.3 mmol/L 3.6   Chloride Latest Ref Range: 94 - 109 mmol/L 101   Carbon Dioxide Latest Ref Range: 20 - 32 mmol/L 33 (H)   Urea Nitrogen Latest Ref Range: 7 - 30 mg/dL 34 (H)   Creatinine Latest Ref Range: 0.52 - 1.04 mg/dL 1.59 (H)   GFR Estimate Latest Ref Range: >60 mL/min/1.7m2 32 (L)   GFR Estimate If Black Latest Ref Range: >60 mL/min/1.7m2 39 (L)   Calcium Latest Ref Range: 8.5 - 10.1 mg/dL 10.0   Anion Gap Latest Ref Range: 3 - 14 mmol/L 5   Glucose Latest Ref Range: 70 - 99 mg/dL 104 (H)           Please limit your fluid intake to 2 L (68 ounces) daily.  2 Liters a day = 8.5 cups, or 68 ounces.  Please limit your salt intake to 2 grams a day or less.     If you gain 2# in 24 hours or 5# in one week call  "Shayna Bennie, RN so we can adjust your medications as needed over the phone.     Please feel free to call me with any questions or concerns.       Shayna Avery RN  [facility]  Cardiology Care Coordinator-Heart Failure Clinic     Questions and [phone].   First press #1 for the University and then press #3 for \"Medical Questions\" to reach us Cardiology Nurses.      On Call Cardiologist for after hours or on weekends: [phone]   option #4 and ask to speak to the on-call Cardiologist. Inform them you are a CORE/heart failure patient at the [facility].        If you need a medication refill please contact your pharmacy.  Please allow 3 business days for your refill to be completed.  _______________________________________________________  C.O.R.E. CLINIC Cardiomyopathy, Optimization, Rehabilitation, Education   The C.O.R.E. CLINIC is a heart failure specialty clinic within the [facility] where you will work with specialized nurse practitioners dedicated to helping patients with heart failure carefully adjust medications, receive education, and learn who and when to call if symptoms develop. They specialize in helping you better understand your condition, slow the progression of your disease, improve the length and quality of your life, help you detect future heart problems before they become life threatening, and avoid hospitalizations.  As always, thank you for trusting us with your health care needs!          Follow-ups after your visit        Additional Services     SLEEP EVALUATION & MANAGEMENT REFERRAL - [facility]  [phone] (Age 2 and up)       Please be aware that coverage of these services is subject to the terms and limitations of your health insurance plan.  Call member services at your health plan with any benefit or coverage questions.      Please bring the following to " your appointment:    >>   List of current medications   >>   This referral request   >>   Any documents/labs given to you for this referral                Follow-Up with CORE Clinic                 Your next 10 appointments already scheduled     Sep 20, 2018 11:00 AM CDT   XR VIDEO SPEECH EVALUATION WITH ESOPHAGRAM with KIET2, UC GIGU RAD   [facility] Imaging Center Xray ([facility])    [address]  1st Floor  [facility] [place] [id number] [phone]           Please bring a list of your current medicines to your exam. (Include vitamins, minerals and over-the-counter medicines.) Leave your valuables at home.  Tell the doctor if there is a chance you could be pregnant.  Do not eat for 4 hours before the exam. Keep drinking clear liquids until 2 hours before the exam.  You may take pain medicine (with a sip of water) up to 4 hours before the exam.  Do not swallow any other medicines unless your doctor tells you to. Talk to your doctor to be sure it s safe to stop your medicines.  Please call the Imaging Department at your exam site with any questions.            Sep 20, 2018 11:00 AM CDT   Video Swallow with GERARD Topete   [facility] Rehab ([facility])    [address]  4th Floor  [place] [address] [phone]           Please check in for this visit in Imaging on the 1st floor.            Oct 09, 2018  6:00 PM CDT   Lab with SREE LAB   [facility] Lab ([facility])    [address]  1st Floor  [facility] [place] [id number] [phone]            Oct 09, 2018  6:30 PM CDT   (Arrive by 6:15 PM)   CORE RETURN with Starla Saez NP   [facility] Heart Care ([facility])    [address]  [address]  [place] [id number] [phone]            Oct 18, 2018  1:30 PM CDT   (Arrive by 1:15 PM)   RETURN DIABETES with Odalis Medley PA-C   [facility] Endocrinology ([facility]  [facility])    [address] [address]   [phone]            Jan 14, 2019  2:30 PM CST   (Arrive by 2:15 PM)   RETURN DIABETES with Keven Jaeger MD   [facility] Endocrinology ([facility])    [address] [place] [address]   [phone]              Future tests that were ordered for you today     Open Future Orders        Priority Expected Expires Ordered    Basic metabolic panel Routine 9/11/2018 9/14/2018 9/6/2018    N terminal pro BNP outpatient Routine 9/11/2018 9/14/2018 9/6/2018    Follow-Up with CORE Clinic Routine 9/27/2018 9/28/2018 9/6/2018    SLEEP EVALUATION & MANAGEMENT REFERRAL - [facility] / [facility] Discovery clinic  [phone] (Age 2 and up) Routine 9/13/2018 12/12/2018 9/6/2018            Who to contact     If you have questions or need follow up information about today's clinic visit or your schedule please contact [facility] directly at [phone].  Normal or non-critical lab and imaging results will be communicated to you by MyChart, letter or phone within 4 business days after the clinic has received the results. If you do not hear from us within 7 days, please contact the clinic through [email or web address] or phone. If you have a critical or abnormal lab result, we will notify you by phone as soon as possible.  Submit refill requests through [facility] or call your pharmacy and they will forward the refill request to us. Please allow 3 business days for your refill to be completed.          Additional Information About Your Visit        MyChart Information     [facility] gives you secure access to your electronic health record. If you see a primary care provider, you can also send messages to your care team and make appointments. If you have questions, please call your primary care clinic.  If you do not have a primary care provider, please call  "[phone] and they will assist you.        Care EveryWhere ID     This is your Care EveryWhere ID. This could be used by other organizations to access your [place] medical records  [id number]        Your Vitals Were     Pulse Height Pulse Oximetry BMI (Body Mass Index)          68 1.676 m (5' 6\") 94% 47.44 kg/m2         Blood Pressure from Last 3 Encounters:   09/06/18 (!) 88/58   08/28/18 130/75   08/23/18 (P) 121/68    Weight from Last 3 Encounters:   09/06/18 133.3 kg (293 lb 14.4 oz)   08/28/18 137 kg (302 lb)   08/23/18 135.6 kg (299 lb)              We Performed the Following     Follow-Up with CORE Clinic          Today's Medication Changes          These changes are accurate as of 9/6/18  3:37 PM.  If you have any questions, ask your nurse or doctor.               These medicines have changed or have updated prescriptions.        Dose/Directions    acetaminophen 325 MG tablet   Commonly known as:  TYLENOL   This may have changed:  when to take this   Used for:  S/P CABG x 3, Acute post-operative pain        Dose:  650 mg   Take 2 tablets (650 mg) by mouth every 4 hours as needed for mild pain   Quantity:  100 tablet   Refills:  0       LIFESCAN FINEPOINT LANCETS Misc   This may have changed:  additional instructions   Used for:  Type 2 diabetes mellitus with diabetic polyneuropathy, without long-term current use of insulin (H)        Use to test blood sugars 2 times daily or as directed.   Quantity:  100 each   Refills:  prn       ONE TOUCH ULTRA (DEVICES) Misc   This may have changed:  See the new instructions.   Used for:  Type II or unspecified type diabetes mellitus without mention of complication, not stated as uncontrolled        test blood sugar BID   Quantity:  1   Refills:  0            Where to get your medicines      These medications were sent to [id number] - [place], [place] - [address] AT [facility] [address], [place] [address]    " Hours:  24-hours Phone:  [phone]     losartan 25 MG tablet    potassium chloride SA 10 MEQ CR tablet                Primary Care Provider Office Phone # Fax #    Rafaela William [phone] [address]  [place] [address]        Equal Access to Services     [facility] Center[facility] : Hadii aad ku hadasho Soomaali, waaxda luqadaha, qaybta kaalmada adeegyada, gopal soin hayjosen adegladys ferminabran pena . So [facility] [phone].    ATENCIÓN: Si habla español, tiene a gillespie disposición servicios gratuitos de asistencia lingüística. [facility] [phone].    We comply with applicable federal civil rights laws and [place] laws. We do not discriminate on the basis of race, color, national origin, age, disability, sex, sexual orientation, or gender identity.            Thank you!     Thank you for choosing [facility]  for your care. Our goal is always to provide you with excellent care. Hearing back from our patients is one way we can continue to improve our services. Please take a few minutes to complete the written survey that you may receive in the mail after your visit with us. Thank you!             Your Updated Medication List - Protect others around you: Learn how to safely use, store and throw away your medicines at www.disposemymeds.org.          This list is accurate as of 9/6/18  3:37 PM.  Always use your most recent med list.                   Brand Name Dispense Instructions for use Diagnosis    acetaminophen 325 MG tablet    TYLENOL    100 tablet    Take 2 tablets (650 mg) by mouth every 4 hours as needed for mild pain    S/P CABG x 3, Acute post-operative pain       albuterol 108 (90 Base) MCG/ACT inhaler    PROAIR HFA    1 Inhaler    Inhale 1-2 puffs into the lungs every 4 hours as needed for wheezing    Chronic obstructive pulmonary disease, unspecified COPD type (H)       aspirin 325 MG EC tablet     30 tablet    Take 1 tablet by mouth daily.        atorvastatin 40 MG tablet     LIPITOR    30 tablet    Take 1 tablet (40 mg) by mouth daily    Atherosclerosis of native coronary artery without angina pectoris, unspecified whether native or transplanted heart       B-D U/F 31G X 5 MM   Generic drug:  insulin pen needle     100 each    USE FOR INSULIN INJECTION    Type 2 diabetes mellitus with diabetic polyneuropathy, without long-term current use of insulin (H)       blood glucose monitoring test strip    no brand specified    200 each    1 strip by In Vitro route 2 times daily Strips per patient's preference    Type 2 diabetes mellitus with diabetic polyneuropathy, without long-term current use of insulin (H)       bumetanide 2 MG tablet    BUMEX    60 tablet    Take 1 tablet (2 mg) by mouth 2 times daily    Chronic diastolic heart failure (H)       clotrimazole 1 % cream    LOTRIMIN    60 g    Apply topically 2 times daily    Dermatitis       EPINEPHrine 0.3 MG/0.3ML injection 2-pack    EPIPEN/ADRENACLICK/or ANY BX GENERIC EQUIV    0.6 mL    Inject 0.3 mLs (0.3 mg) into the muscle once as needed for anaphylaxis    Allergic reaction, subsequent encounter       escitalopram 20 MG tablet    LEXAPRO    30 tablet    Take 1 tablet (20 mg) by mouth every morning    Recurrent major depressive disorder, in partial remission (H)       febuxostat 40 MG Tabs tablet    ULORIC    30 tablet    Take 1 tablet (40 mg) by mouth every evening    Vitamin D deficiency       ferrous gluconate 324 (38 Fe) MG tablet    FERGON    12 tablet    Take 1 tablet (324 mg) by mouth Every Mon, Wed, Fri Morning    Chronic diastolic heart failure (H), Iron deficiency anemia secondary to inadequate dietary iron intake       fluconazole 150 MG tablet    DIFLUCAN    4 tablet    Take 1 tablet (150 mg) by mouth every 3 days    Candidiasis of vulva and vagina       guaiFENesin 600 MG 12 hr tablet    MUCINEX     Take 1 tablet (600 mg) by mouth 2 times daily        insulin isophane human 100 UNIT/ML injection    HumuLIN N PEN    10.5  mL    Inject 35 Units Subcutaneous every 24 hours    Type 2 diabetes mellitus with diabetic chronic kidney disease, unspecified CKD stage, unspecified long term insulin use status (H)       insulin syringes (disposable) U-100 1 ML Misc     100 each    1 each 5 times daily    Type 2 diabetes mellitus with chronic kidney disease, without long-term current use of insulin, unspecified CKD stage (H)       ketamine (KETALAR) 5%-gabapentin (NEURONTIN) 8% 5%-8% Gel topical PLO cream     30 g    Apply 30 g topically 3 times daily    Mononeuritis       levothyroxine 150 MCG tablet    SYNTHROID/LEVOTHROID    30 tablet    Take 1 tablet (150 mcg) by mouth daily    Other specified hypothyroidism       [employer]otics[employer] FINEPOINT LANCETS Misc     100 each    Use to test blood sugars 2 times daily or as directed.    Type 2 diabetes mellitus with diabetic polyneuropathy, without long-term current use of insulin (H)       losartan 25 MG tablet    COZAAR    30 tablet    Take 1 tablet (25 mg) by mouth daily    Chronic diastolic heart failure (H)       Metoprolol Tartrate 75 MG Tabs     180 tablet    Take 75 mg by mouth 2 times daily    Atherosclerosis of native coronary artery without angina pectoris, unspecified whether native or transplanted heart       miconazole 2 % powder    MICATIN; MICRO GUARD    71 g    Apply topically 2 times daily    Dermatitis       nitroGLYcerin 0.4 MG sublingual tablet    NITROSTAT    25 tablet    For chest pain place 1 tablet under the tongue every 5 minutes for 3 doses. If symptoms persist 5 minutes after 1st dose call 911.    Atherosclerosis of native coronary artery without angina pectoris, unspecified whether native or transplanted heart       ONE TOUCH ULTRA (DEVICES) Misc     1    test blood sugar BID    Type II or unspecified type diabetes mellitus without mention of complication, not stated as uncontrolled       polyethylene glycol Packet    MIRALAX/GLYCOLAX    7 packet    Take 17 g by mouth daily     Atherosclerosis of native coronary artery without angina pectoris, unspecified whether native or transplanted heart       potassium chloride SA 10 MEQ CR tablet    K-DUR/KLOR-CON M    360 tablet    Take 4 tablets (40 mEq) by mouth 3 times daily    Chronic diastolic heart failure (H)       pregabalin 100 MG capsule    LYRICA    90 capsule    Take 1 capsule (100 mg) by mouth 2 times daily    Pain in joint of left shoulder       repaglinide 1 MG tablet    PRANDIN    90 tablet    Take 1 tablet (1 mg) by mouth 3 times daily (before meals)    Type 2 diabetes mellitus with diabetic chronic kidney disease, unspecified CKD stage, unspecified long term insulin use status (H)       senna-docusate 8.6-50 MG per tablet    SENOKOT-S;PERICOLACE    30 tablet    Take 1-2 tablets by mouth 2 times daily as needed for constipation    Atherosclerosis of native coronary artery without angina pectoris, unspecified whether native or transplanted heart       TIZANIDINE HCL PO      Take 4 mg by mouth At Bedtime        traZODone 50 MG tablet    DESYREL    270 tablet    Take 3 tablets (150 mg) by mouth At Bedtime    Recurrent major depressive disorder, in partial remission (H)

## 2018-09-07 ENCOUNTER — TELEPHONE (OUTPATIENT)
Dept: FAMILY MEDICINE | Facility: CLINIC | Age: 73
End: 2018-09-07

## 2018-09-07 NOTE — TELEPHONE ENCOUNTER
I think she has both flexeril and tizanidine.  She can take either (but not both).  Ok to take with trazodone, though may increase risk of falls/dizziness, so use caution if taking at night.

## 2018-09-07 NOTE — TELEPHONE ENCOUNTER
Dr. Garcia verifying that ok to take flexeril as she has an RX on hand from last October.  She is on trazodone.  Any precautions or ok to take for her back pain?  She has no tingling or numbness in her legs. Is icing the area. No head injury with fall. Tripped on a bath mat.      Plan: if worsening pain will go to  on weekend.  Does have a Tuesday appt here with PCP.   Monica Gonzalez RN

## 2018-09-07 NOTE — TELEPHONE ENCOUNTER
Patient called explaining sheslipped in the shower and now her back is hurting.  Patient reports she has some Flexeril and asking if ok to take with the medications she is on?    Patient states she needs something more then Tylenol and did schedule an appt on Tues 9/11/18 as well    Ok to leave a message

## 2018-09-09 ENCOUNTER — NURSE TRIAGE (OUTPATIENT)
Dept: NURSING | Facility: CLINIC | Age: 73
End: 2018-09-09

## 2018-09-09 ENCOUNTER — HOSPITAL ENCOUNTER (OUTPATIENT)
Facility: CLINIC | Age: 73
Setting detail: OBSERVATION
Discharge: HOME | End: 2018-09-10
Attending: EMERGENCY MEDICINE | Admitting: FAMILY MEDICINE
Payer: MEDICARE

## 2018-09-09 DIAGNOSIS — Z79.4 TYPE 2 DIABETES MELLITUS WITH STAGE 3 CHRONIC KIDNEY DISEASE, WITH LONG-TERM CURRENT USE OF INSULIN (H): Chronic | ICD-10-CM

## 2018-09-09 DIAGNOSIS — N18.30 TYPE 2 DIABETES MELLITUS WITH STAGE 3 CHRONIC KIDNEY DISEASE, WITH LONG-TERM CURRENT USE OF INSULIN (H): Chronic | ICD-10-CM

## 2018-09-09 DIAGNOSIS — E11.649 DIABETIC HYPOGLYCEMIA (H): ICD-10-CM

## 2018-09-09 DIAGNOSIS — M54.5 ACUTE MIDLINE LOW BACK PAIN, WITH SCIATICA PRESENCE UNSPECIFIED: ICD-10-CM

## 2018-09-09 DIAGNOSIS — M62.81 GENERALIZED MUSCLE WEAKNESS: Primary | ICD-10-CM

## 2018-09-09 DIAGNOSIS — R29.6 FALLS FREQUENTLY: ICD-10-CM

## 2018-09-09 DIAGNOSIS — Z79.4 ENCOUNTER FOR LONG-TERM (CURRENT) USE OF INSULIN (H): ICD-10-CM

## 2018-09-09 DIAGNOSIS — E11.22 TYPE 2 DIABETES MELLITUS WITH STAGE 3 CHRONIC KIDNEY DISEASE, WITH LONG-TERM CURRENT USE OF INSULIN (H): Chronic | ICD-10-CM

## 2018-09-09 LAB
ALBUMIN UR-MCNC: NEGATIVE MG/DL
ANION GAP SERPL CALCULATED.3IONS-SCNC: 7 MMOL/L (ref 3–14)
APPEARANCE UR: CLEAR
BASOPHILS # BLD AUTO: 0 10E9/L (ref 0–0.2)
BASOPHILS NFR BLD AUTO: 0.5 %
BILIRUB UR QL STRIP: NEGATIVE
BUN SERPL-MCNC: 36 MG/DL (ref 7–30)
CALCIUM SERPL-MCNC: 9.3 MG/DL (ref 8.5–10.1)
CHLORIDE SERPL-SCNC: 103 MMOL/L (ref 94–109)
CO2 SERPL-SCNC: 31 MMOL/L (ref 20–32)
COLOR UR AUTO: ABNORMAL
CREAT SERPL-MCNC: 1.62 MG/DL (ref 0.52–1.04)
DIFFERENTIAL METHOD BLD: ABNORMAL
EOSINOPHIL # BLD AUTO: 0.2 10E9/L (ref 0–0.7)
EOSINOPHIL NFR BLD AUTO: 2.9 %
ERYTHROCYTE [DISTWIDTH] IN BLOOD BY AUTOMATED COUNT: 15.6 % (ref 10–15)
GFR SERPL CREATININE-BSD FRML MDRD: 31 ML/MIN/1.7M2
GLUCOSE BLDC GLUCOMTR-MCNC: 183 MG/DL (ref 70–99)
GLUCOSE BLDC GLUCOMTR-MCNC: 45 MG/DL (ref 70–99)
GLUCOSE BLDC GLUCOMTR-MCNC: 68 MG/DL (ref 70–99)
GLUCOSE BLDC GLUCOMTR-MCNC: 70 MG/DL (ref 70–99)
GLUCOSE BLDC GLUCOMTR-MCNC: 70 MG/DL (ref 70–99)
GLUCOSE BLDC GLUCOMTR-MCNC: 95 MG/DL (ref 70–99)
GLUCOSE SERPL-MCNC: 65 MG/DL (ref 70–99)
GLUCOSE UR STRIP-MCNC: NEGATIVE MG/DL
HCT VFR BLD AUTO: 41.4 % (ref 35–47)
HGB BLD-MCNC: 12.4 G/DL (ref 11.7–15.7)
HGB UR QL STRIP: NEGATIVE
HYALINE CASTS #/AREA URNS LPF: 43 /LPF (ref 0–2)
IMM GRANULOCYTES # BLD: 0 10E9/L (ref 0–0.4)
IMM GRANULOCYTES NFR BLD: 0.1 %
KETONES UR STRIP-MCNC: NEGATIVE MG/DL
LEUKOCYTE ESTERASE UR QL STRIP: ABNORMAL
LYMPHOCYTES # BLD AUTO: 2.2 10E9/L (ref 0.8–5.3)
LYMPHOCYTES NFR BLD AUTO: 27.7 %
MCH RBC QN AUTO: 25.7 PG (ref 26.5–33)
MCHC RBC AUTO-ENTMCNC: 30 G/DL (ref 31.5–36.5)
MCV RBC AUTO: 86 FL (ref 78–100)
MONOCYTES # BLD AUTO: 0.4 10E9/L (ref 0–1.3)
MONOCYTES NFR BLD AUTO: 5.3 %
MUCOUS THREADS #/AREA URNS LPF: PRESENT /LPF
NEUTROPHILS # BLD AUTO: 5 10E9/L (ref 1.6–8.3)
NEUTROPHILS NFR BLD AUTO: 63.5 %
NITRATE UR QL: NEGATIVE
NRBC # BLD AUTO: 0 10*3/UL
NRBC BLD AUTO-RTO: 0 /100
PH UR STRIP: 5.5 PH (ref 5–7)
PLATELET # BLD AUTO: 125 10E9/L (ref 150–450)
POTASSIUM SERPL-SCNC: 4.4 MMOL/L (ref 3.4–5.3)
RBC # BLD AUTO: 4.83 10E12/L (ref 3.8–5.2)
RBC #/AREA URNS AUTO: <1 /HPF (ref 0–2)
SODIUM SERPL-SCNC: 141 MMOL/L (ref 133–144)
SOURCE: ABNORMAL
SP GR UR STRIP: 1.01 (ref 1–1.03)
UROBILINOGEN UR STRIP-MCNC: NORMAL MG/DL (ref 0–2)
WBC # BLD AUTO: 7.9 10E9/L (ref 4–11)
WBC #/AREA URNS AUTO: 3 /HPF (ref 0–5)

## 2018-09-09 PROCEDURE — 81001 URINALYSIS AUTO W/SCOPE: CPT | Performed by: EMERGENCY MEDICINE

## 2018-09-09 PROCEDURE — 99285 EMERGENCY DEPT VISIT HI MDM: CPT | Mod: 25 | Performed by: EMERGENCY MEDICINE

## 2018-09-09 PROCEDURE — 99285 EMERGENCY DEPT VISIT HI MDM: CPT | Mod: Z6 | Performed by: EMERGENCY MEDICINE

## 2018-09-09 PROCEDURE — 85025 COMPLETE CBC W/AUTO DIFF WBC: CPT | Performed by: EMERGENCY MEDICINE

## 2018-09-09 PROCEDURE — 00000146 ZZHCL STATISTIC GLUCOSE BY METER IP

## 2018-09-09 PROCEDURE — 40000556 ZZH STATISTIC PERIPHERAL IV START W US GUIDANCE

## 2018-09-09 PROCEDURE — 87086 URINE CULTURE/COLONY COUNT: CPT | Performed by: EMERGENCY MEDICINE

## 2018-09-09 PROCEDURE — 80048 BASIC METABOLIC PNL TOTAL CA: CPT | Performed by: EMERGENCY MEDICINE

## 2018-09-09 RX ORDER — NIACIN 500 MG/1
500 TABLET, EXTENDED RELEASE ORAL DAILY
Refills: 9 | Status: ON HOLD | COMMUNITY
Start: 2018-08-20 | End: 2019-05-02

## 2018-09-09 ASSESSMENT — ENCOUNTER SYMPTOMS
NECK PAIN: 1
WEAKNESS: 1
NUMBNESS: 0
BACK PAIN: 1
TREMORS: 1

## 2018-09-09 NOTE — IP AVS SNAPSHOT
MRN:[id number]                      After Visit Summary   9/9/2018    Mariel Ribeiro    MRN: [id number]           Thank you!     Thank you for choosing [facility] for your care. Our goal is always to provide you with excellent care. Hearing back from our patients is one way we can continue to improve our services. Please take a few minutes to complete the written survey that you may receive in the mail after you visit with us. Thank you!        Patient Information     Date Of Birth          1945        Designated Caregiver       Most Recent Value    Caregiver    Will someone help with your care after discharge? yes    Name of designated caregiver home health aid    Phone number of caregiver TBD    Caregiver address TBD      About your hospital stay     You were admitted on:  September 10, 2018 You last received care in the:  [address] [facility]    You were discharged on:  September 10, 2018        Reason for your hospital stay       You were admitted for evaluation after a fall.                  Who to Call     For medical emergencies, please call 911.  For non-urgent questions about your medical care, please call your primary care provider or clinic, [phone]          Attending Provider     Provider Specialty    Deep Lopez MD Emergency Medicine    [facility], Kendy Borrero MD Family Practice    Christopher, Amaury Cardona MD Family Practice       Primary Care Provider Office Phone # Fax #    Rafaela William [phone]      After Care Instructions     Activity       Your activity upon discharge: activity as tolerated            Diet       Follow this diet upon discharge: Regular diet                  Follow-up Appointments     Follow Up and recommended labs and tests       Follow up with primary care provider, Rafaela William, within 7 days for hospital follow- up.  Recommending discussing further evaluation and work by physical therapy.                  Your next 10  appointments already scheduled     Sep 12, 2018  9:20 AM CDT   SHORT with Rafaela William MD   [facility] ([facility])    [address] [place] [id number] [phone]            Sep 20, 2018 11:00 AM CDT   XR VIDEO SPEECH EVALUATION WITH ESOPHAGRAM with UCXR2, UC GIGU RAD   [facility] ([facility])    [address]  1st Floor  [facility] [address] [phone]           How do I prepare for my exam? (Food and drink instructions) Do not eat for 4 hours before the exam. Keep drinking clear liquids until 2 hours before the exam.  How do I prepare for my exam? (Other instructions) You may take pain medicine (with a sip of water) up to 4 hours before the exam. Do not swallow any other medicines unless your doctor tells you to. Talk to your doctor to be sure it s safe to stop your medicines.  What should I wear: Wear comfortable clothes.  How long does the exam take: The exam usually takes about 30-45 minutes.  What should I bring: Bring a list of your current medicines to your exam. (Include vitamins, minerals and over-the-counter medicines.) Leave your valuables at home. Do I need a :  No  is needed.  What do I need to tell my doctor:  If you think you might be pregnant, tell your doctor.  What should I do after the exam: Drink lots of fluids in the next one to two days. This will help you pass the rest of the barium. Your stool may be white. Take walks if possible. You may take a mild laxative (medicine to loosten your stools), but check with your doctor first. If you don t have a bowel movement within two days, call your doctor.  What is this test: This X-ray exam look at your esophagus. This exam uses barium (a white liquid) to help the X-rays show up more clearly.  Who should I call with questions: If you have any questions, please call the Imaging Department where you will have your exam.  Directions, parking instructions, and other information is available on our website, [email or web address].            Sep 20, 2018 11:00 AM CDT   Video Swallow with GERARD Topete   [facility] ([facility])    [address] [place] [address]  4th [facility] [id number] [phone]           Please check in for this visit in Imaging on the 1st floor.            Oct 09, 2018  6:00 PM CDT   Lab with [facility]   [facility] ([facility])    [address] [place] [id number] [phone]            Oct 09, 2018  6:30 PM CDT   (Arrive by 6:15 PM)   CORE RETURN with Starla Saez NP   [facility] ([facility])    [address] [place] [address]  [address] [place] [id number] [phone]            Oct 18, 2018  1:30 PM CDT   (Arrive by 1:15 PM)   RETURN DIABETES with Odalis Medley PA-C   [facility] ([facility])    [address] [place] [id number] [phone]            Jan 14, 2019  2:30 PM CST   (Arrive by 2:15 PM)   RETURN DIABETES with Keven Jaeger MD   [facility] ([facility])    [address] [place] [id number] [phone]              Additional Services     Home care nursing referral       Resume    [facility]   Phone: [phone]     For resumption of care.   RN evaluation post hospitalization. Assess vital signs, respiratory and cardiac status, activity tolerance, hydration, nutritional status, med setup and management.    PT/OT eval and treat for deconditioning, strengthening and endurance.   HHA as per previous orders for assistance with ADLS.   Speech therapy eval and treat.     Your provider has ordered home care nursing services. If you have not been contacted within 2 days of your discharge please call the inpatient department  "phone number at [phone] .                  Further instructions from your care team       ________________________________________________________  Discharge RN please fax discharge orders to home care agency: [facility]  ________________________________________________________    Pending Results     Date and Time Order Name Status Description    9/9/2018 1630 Urine Culture Preliminary             Statement of Approval     Ordered          09/10/18 1659  I have reviewed and agree with all the recommendations and orders detailed in this document.  EFFECTIVE NOW     Approved and electronically signed by:  Shayna Wiggins DO             Admission Information     Date & Time Provider Department Dept. Phone    9/9/2018 Amaury White MD [address] [facility] [phone]      Your Vitals Were     Blood Pressure Pulse Temperature Respirations Height Weight    127/54 (BP Location: Right arm) 78 97.6  F (36.4  C) (Axillary) 16 1.676 m (5' 6\") 132.5 kg (292 lb)    Pulse Oximetry BMI (Body Mass Index)                95% 47.13 kg/m2          MyChart Information     [employer] gives you secure access to your electronic health record. If you see a primary care provider, you can also send messages to your care team and make appointments. If you have questions, please call your primary care clinic.  If you do not have a primary care provider, please call [phone] and they will assist you.        Care EveryWhere ID     This is your Care EveryWhere ID. This could be used by other organizations to access your [facility] medical records  [id number]        Equal Access to Services     KJ GARCIA: Marcelina Martin, dario cox, gopal francis. [facility] [phone].    ATENCIÓN: Si habla español, tiene a gillespie disposición servicios gratuitos de asistencia lingüística. Llame al [phone].    We comply with applicable federal civil rights laws and [place] laws. " We do not discriminate on the basis of race, color, national origin, age, disability, sex, sexual orientation, or gender identity.               Review of your medicines      CONTINUE these medicines which may have CHANGED, or have new prescriptions. If we are uncertain of the size of tablets/capsules you have at home, strength may be listed as something that might have changed.        Dose / Directions    acetaminophen 325 MG tablet   Commonly known as:  TYLENOL   This may have changed:  when to take this   Used for:  S/P CABG x 3, Acute post-operative pain        Dose:  650 mg   Take 2 tablets (650 mg) by mouth every 4 hours as needed for mild pain   Quantity:  100 tablet   Refills:  0       orderbird AG FINEPOINT LANCETS Misc   This may have changed:  additional instructions   Used for:  Type 2 diabetes mellitus with diabetic polyneuropathy, without long-term current use of insulin (H)        Use to test blood sugars 2 times daily or as directed.   Quantity:  100 each   Refills:  prn       ONE TOUCH ULTRA (DEVICES) Misc   This may have changed:  See the new instructions.   Used for:  Type II or unspecified type diabetes mellitus without mention of complication, not stated as uncontrolled        test blood sugar BID   Quantity:  1   Refills:  0       polyethylene glycol Packet   Commonly known as:  MIRALAX/GLYCOLAX   This may have changed:    - when to take this  - reasons to take this   Used for:  Atherosclerosis of native coronary artery without angina pectoris, unspecified whether native or transplanted heart        Dose:  17 g   Take 17 g by mouth daily   Quantity:  7 packet   Refills:  0       senna-docusate 8.6-50 MG per tablet   Commonly known as:  SENOKOT-S;PERICOLACE   This may have changed:  when to take this   Used for:  Atherosclerosis of native coronary artery without angina pectoris, unspecified whether native or transplanted heart        Dose:  1-2 tablet   Take 1-2 tablets by mouth 2 times daily as  needed for constipation   Quantity:  30 tablet   Refills:  0         CONTINUE these medicines which have NOT CHANGED        Dose / Directions    albuterol 108 (90 Base) MCG/ACT inhaler   Commonly known as:  PROAIR HFA   Used for:  Chronic obstructive pulmonary disease, unspecified COPD type (H)        Dose:  1-2 puff   Inhale 1-2 puffs into the lungs every 4 hours as needed for wheezing   Quantity:  1 Inhaler   Refills:  PRN       aspirin 325 MG EC tablet        Dose:  325 mg   Take 1 tablet by mouth daily.   Quantity:  30 tablet   Refills:  0       atorvastatin 40 MG tablet   Commonly known as:  LIPITOR   Used for:  Atherosclerosis of native coronary artery without angina pectoris, unspecified whether native or transplanted heart        Dose:  40 mg   Take 1 tablet (40 mg) by mouth daily   Quantity:  30 tablet   Refills:  0       B-D U/F 31G X 5 MM   Used for:  Type 2 diabetes mellitus with diabetic polyneuropathy, without long-term current use of insulin (H)   Generic drug:  insulin pen needle        USE FOR INSULIN INJECTION   Quantity:  100 each   Refills:  0       blood glucose monitoring test strip   Commonly known as:  no brand specified   Used for:  Type 2 diabetes mellitus with diabetic polyneuropathy, without long-term current use of insulin (H)        Dose:  1 strip   1 strip by In Vitro route 2 times daily Strips per patient's preference   Quantity:  200 each   Refills:  3       bumetanide 2 MG tablet   Commonly known as:  BUMEX   Used for:  Chronic diastolic heart failure (H)        Dose:  2 mg   Take 1 tablet (2 mg) by mouth 2 times daily   Quantity:  60 tablet   Refills:  1       clotrimazole 1 % cream   Commonly known as:  LOTRIMIN   Used for:  Dermatitis        Apply topically 2 times daily   Quantity:  60 g   Refills:  3       cyclobenzaprine 10 MG tablet   Commonly known as:  FLEXERIL        Dose:  10 mg   Take 10 mg by mouth daily as needed for muscle spasms Does not take tizanidine or trazodone  if using cyclobenzaprine   Refills:  0       EPINEPHrine 0.3 MG/0.3ML injection 2-pack   Commonly known as:  EPIPEN/ADRENACLICK/or ANY BX GENERIC EQUIV   Used for:  Allergic reaction, subsequent encounter        Dose:  0.3 mg   Inject 0.3 mLs (0.3 mg) into the muscle once as needed for anaphylaxis   Quantity:  0.6 mL   Refills:  0       escitalopram 20 MG tablet   Commonly known as:  LEXAPRO   Used for:  Recurrent major depressive disorder, in partial remission (H)        Dose:  20 mg   Take 1 tablet (20 mg) by mouth every morning   Quantity:  30 tablet   Refills:  0       febuxostat 40 MG Tabs tablet   Commonly known as:  ULORIC        Dose:  40 mg   Take 40 mg by mouth every evening   Refills:  0       ferrous gluconate 324 (38 Fe) MG tablet   Commonly known as:  FERGON   Used for:  Chronic diastolic heart failure (H), Iron deficiency anemia secondary to inadequate dietary iron intake        Dose:  324 mg   Take 1 tablet (324 mg) by mouth Every Mon, Wed, Fri Morning   Quantity:  12 tablet   Refills:  2       fluconazole 150 MG tablet   Commonly known as:  DIFLUCAN   Used for:  Candidiasis of vulva and vagina        Dose:  150 mg   Take 1 tablet (150 mg) by mouth every 3 days   Quantity:  4 tablet   Refills:  0       FOLIC ACID PO        Dose:  1 mg   Take 1 mg by mouth daily   Refills:  0       guaiFENesin 600 MG 12 hr tablet   Commonly known as:  MUCINEX        Dose:  600 mg   Take 1 tablet (600 mg) by mouth 2 times daily   Refills:  0       insulin isophane human 100 UNIT/ML injection   Commonly known as:  HumuLIN N PEN        Dose:  35 Units   Inject 35 Units Subcutaneous 2 times daily   Refills:  0       insulin syringes (disposable) U-100 1 ML Misc   Used for:  Type 2 diabetes mellitus with chronic kidney disease, without long-term current use of insulin, unspecified CKD stage (H)        Dose:  1 each   1 each 5 times daily   Quantity:  100 each   Refills:  11       ketamine (KETALAR) 5%-gabapentin  (NEURONTIN) 8% 5%-8% Gel topical PLO cream   Used for:  Mononeuritis        Dose:  30 g   Apply 30 g topically 3 times daily   Quantity:  30 g   Refills:  0       levothyroxine 150 MCG tablet   Commonly known as:  SYNTHROID/LEVOTHROID   Used for:  Other specified hypothyroidism        Dose:  150 mcg   Take 1 tablet (150 mcg) by mouth daily   Quantity:  30 tablet   Refills:  0       losartan 25 MG tablet   Commonly known as:  COZAAR   Used for:  Chronic diastolic heart failure (H)        Dose:  25 mg   Take 1 tablet (25 mg) by mouth daily   Quantity:  30 tablet   Refills:  3       Metoprolol Tartrate 75 MG Tabs   Used for:  Atherosclerosis of native coronary artery without angina pectoris, unspecified whether native or transplanted heart        Dose:  75 mg   Take 75 mg by mouth 2 times daily   Quantity:  180 tablet   Refills:  3       miconazole 2 % powder   Commonly known as:  MICATIN; MICRO GUARD   Used for:  Dermatitis        Apply topically 2 times daily   Quantity:  71 g   Refills:  1       niacin 500 MG CR tablet   Commonly known as:  NIASPAN        Dose:  500 mg   Take 500 mg by mouth daily   Refills:  9       nitroGLYcerin 0.4 MG sublingual tablet   Commonly known as:  NITROSTAT   Used for:  Atherosclerosis of native coronary artery without angina pectoris, unspecified whether native or transplanted heart        For chest pain place 1 tablet under the tongue every 5 minutes for 3 doses. If symptoms persist 5 minutes after 1st dose call 911.   Quantity:  25 tablet   Refills:  0       potassium chloride SA 10 MEQ CR tablet   Commonly known as:  K-DUR/KLOR-CON M   Used for:  Chronic diastolic heart failure (H)        Dose:  40 mEq   Take 4 tablets (40 mEq) by mouth 3 times daily   Quantity:  360 tablet   Refills:  3       pregabalin 100 MG capsule   Commonly known as:  LYRICA   Used for:  Pain in joint of left shoulder        Dose:  100 mg   Take 1 capsule (100 mg) by mouth 2 times daily   Quantity:  90 capsule    Refills:  0       repaglinide 1 MG tablet   Commonly known as:  PRANDIN   Used for:  Type 2 diabetes mellitus with diabetic chronic kidney disease, unspecified CKD stage, unspecified long term insulin use status (H)        Dose:  1 mg   Take 1 tablet (1 mg) by mouth 3 times daily (before meals)   Quantity:  90 tablet   Refills:  3       TIZANIDINE HCL PO   Indication:  restless leg        Dose:  4 mg   Take 4 mg by mouth At Bedtime   Refills:  0       traZODone 50 MG tablet   Commonly known as:  DESYREL   Used for:  Recurrent major depressive disorder, in partial remission (H)        Dose:  150 mg   Take 3 tablets (150 mg) by mouth At Bedtime   Quantity:  270 tablet   Refills:  1       VITAMIN D (CHOLECALCIFEROL) PO        Dose:  01645 Units   Take 10,000 Units by mouth daily   Refills:  0                Protect others around you: Learn how to safely use, store and throw away your medicines at www.disposemymeds.org.             Medication List: This is a list of all your medications and when to take them. Check marks below indicate your daily home schedule. Keep this list as a reference.      Medications           Morning Afternoon Evening Bedtime As Needed    acetaminophen 325 MG tablet   Commonly known as:  TYLENOL   Take 2 tablets (650 mg) by mouth every 4 hours as needed for mild pain   Last time this was given:  1,000 mg on 9/10/2018  4:29 PM                                albuterol 108 (90 Base) MCG/ACT inhaler   Commonly known as:  PROAIR HFA   Inhale 1-2 puffs into the lungs every 4 hours as needed for wheezing                                aspirin 325 MG EC tablet   Take 1 tablet by mouth daily.   Last time this was given:  325 mg on 9/10/2018  8:26 AM                                atorvastatin 40 MG tablet   Commonly known as:  LIPITOR   Take 1 tablet (40 mg) by mouth daily                                B-D U/F 31G X 5 MM   USE FOR INSULIN INJECTION   Generic drug:  insulin pen needle                                 blood glucose monitoring test strip   Commonly known as:  no brand specified   1 strip by In Vitro route 2 times daily Strips per patient's preference                                bumetanide 2 MG tablet   Commonly known as:  BUMEX   Take 1 tablet (2 mg) by mouth 2 times daily   Last time this was given:  2 mg on 9/10/2018  4:29 PM                                clotrimazole 1 % cream   Commonly known as:  LOTRIMIN   Apply topically 2 times daily                                cyclobenzaprine 10 MG tablet   Commonly known as:  FLEXERIL   Take 10 mg by mouth daily as needed for muscle spasms Does not take tizanidine or trazodone if using cyclobenzaprine                                EPINEPHrine 0.3 MG/0.3ML injection 2-pack   Commonly known as:  EPIPEN/ADRENACLICK/or ANY BX GENERIC EQUIV   Inject 0.3 mLs (0.3 mg) into the muscle once as needed for anaphylaxis                                escitalopram 20 MG tablet   Commonly known as:  LEXAPRO   Take 1 tablet (20 mg) by mouth every morning   Last time this was given:  20 mg on 9/10/2018  8:24 AM                                febuxostat 40 MG Tabs tablet   Commonly known as:  ULORIC   Take 40 mg by mouth every evening                                ferrous gluconate 324 (38 Fe) MG tablet   Commonly known as:  FERGON   Take 1 tablet (324 mg) by mouth Every Mon, Wed, Fri Morning                                fluconazole 150 MG tablet   Commonly known as:  DIFLUCAN   Take 1 tablet (150 mg) by mouth every 3 days                                FOLIC ACID PO   Take 1 mg by mouth daily                                guaiFENesin 600 MG 12 hr tablet   Commonly known as:  MUCINEX   Take 1 tablet (600 mg) by mouth 2 times daily                                insulin isophane human 100 UNIT/ML injection   Commonly known as:  HumuLIN N PEN   Inject 35 Units Subcutaneous 2 times daily   Last time this was given:  35 Units on 9/10/2018 11:49 AM                                 insulin syringes (disposable) U-100 1 ML Misc   1 each 5 times daily                                ketamine (KETALAR) 5%-gabapentin (NEURONTIN) 8% 5%-8% Gel topical PLO cream   Apply 30 g topically 3 times daily                                levothyroxine 150 MCG tablet   Commonly known as:  SYNTHROID/LEVOTHROID   Take 1 tablet (150 mcg) by mouth daily   Last time this was given:  150 mcg on 9/10/2018  8:24 AM                                beneSol FINEPOINT LANCETS Misc   Use to test blood sugars 2 times daily or as directed.                                losartan 25 MG tablet   Commonly known as:  COZAAR   Take 1 tablet (25 mg) by mouth daily   Last time this was given:  25 mg on 9/10/2018  8:26 AM                                Metoprolol Tartrate 75 MG Tabs   Take 75 mg by mouth 2 times daily   Last time this was given:  75 mg on 9/10/2018  8:24 AM                                miconazole 2 % powder   Commonly known as:  MICATIN; MICRO GUARD   Apply topically 2 times daily                                niacin 500 MG CR tablet   Commonly known as:  NIASPAN   Take 500 mg by mouth daily   Last time this was given:  500 mg on 9/10/2018 10:30 AM                                nitroGLYcerin 0.4 MG sublingual tablet   Commonly known as:  NITROSTAT   For chest pain place 1 tablet under the tongue every 5 minutes for 3 doses. If symptoms persist 5 minutes after 1st dose call 911.                                ONE TOUCH ULTRA (DEVICES) Misc   test blood sugar BID                                polyethylene glycol Packet   Commonly known as:  MIRALAX/GLYCOLAX   Take 17 g by mouth daily                                potassium chloride SA 10 MEQ CR tablet   Commonly known as:  K-DUR/KLOR-CON M   Take 4 tablets (40 mEq) by mouth 3 times daily   Last time this was given:  40 mEq on 9/10/2018  2:10 PM                                pregabalin 100 MG capsule   Commonly known as:  LYRICA   Take 1 capsule (100  mg) by mouth 2 times daily   Last time this was given:  100 mg on 9/10/2018  8:13 AM                                repaglinide 1 MG tablet   Commonly known as:  PRANDIN   Take 1 tablet (1 mg) by mouth 3 times daily (before meals)   Last time this was given:  1 mg on 9/10/2018  1:04 PM                                senna-docusate 8.6-50 MG per tablet   Commonly known as:  SENOKOT-S;PERICOLACE   Take 1-2 tablets by mouth 2 times daily as needed for constipation   Last time this was given:  1 tablet on 9/10/2018  8:29 AM                                TIZANIDINE HCL PO   Take 4 mg by mouth At Bedtime                                traZODone 50 MG tablet   Commonly known as:  DESYREL   Take 3 tablets (150 mg) by mouth At Bedtime   Last time this was given:  150 mg on 9/10/2018  5:56 AM                                VITAMIN D (CHOLECALCIFEROL) PO   Take 10,000 Units by mouth daily
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
## 2018-09-09 NOTE — IP AVS SNAPSHOT
[address] [address]    Phone:  [phone]                                       After Visit Summary   9/9/2018    Mariel Ribeiro    MRN: [id number]           After Visit Summary Signature Page     I have received my discharge instructions, and my questions have been answered. I have discussed any challenges I see with this plan with the nurse or doctor.    ..........................................................................................................................................  Patient/Patient Representative Signature      ..........................................................................................................................................  Patient Representative Print Name and Relationship to Patient    ..................................................               ................................................  Date                                            Time    ..........................................................................................................................................  Reviewed by Signature/Title    ...................................................              ..............................................  Date                                                            Time          22EPIC Rev 08/18
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
## 2018-09-09 NOTE — LETTER
Transition Communication Hand-off for Care Transitions to Next Level of Care Provider    Name: Mariel Ribeiro  : 1945  MRN #: [id number]  Primary Care Provider: Rafaela William     Primary Clinic: [address] [place] [address]     Reason for Hospitalization:  Acute midline low back pain, with sciatica presence unspecified [M54.5]  Admit Date/Time: 2018  3:29 PM  Discharge Date: 9.10.18  Payor Source: Payor: MEDICARE / Plan: MEDICARE / Product Type: Medicare /              Reason for Communication Hand-off Referral: Other LE weakness    Discharge Plan: pt was Observation status and was recommended to go to TCU but she would have to private pay--she discharged home with resumption of [facility] with RN,Home PT and OT, HHA and SLP       Concern for non-adherence with plan of care:   Y/N N  Discharge Needs Assessment:  Needs       Most Recent Value    Equipment Currently Used at Home cane, quad, grab bar, shower chair, walker, rolling, walker, standard, other (see comments) [adjustable bed (tempurpedic)]    Transportation Available family or friend will provide            Follow-up specialty is recommended: Yes    Follow-up plan:  Future Appointments  Date Time Provider Department Center   2018 9:20 AM Rafaela William MD [facility]   2018 11:00 AM Opal Carvajal, SLP [facility]   2018 11:00 AM [facility]   2018 8:00 AM Noah Girard MD [facility]   10/9/2018 6:00 PM UC LAB [facility]   10/9/2018 6:30 PM Starla Saez NP [facility]   10/18/2018 1:30 PM Odalis Medley PA-C [facility]   2019 2:30 PM Keven Jaeger MD [facility]       Any outstanding tests or procedures:        Referrals     Future Labs/Procedures    Home care nursing referral     Comments:    Resume    [facility]   Phone: [phone]     For resumption of care.   RN evaluation post hospitalization. Assess vital signs,  respiratory and cardiac status, activity tolerance, hydration, nutritional status, med setup and management.    PT/OT eval and treat for deconditioning, strengthening and endurance.   HHA as per previous orders for assistance with ADLS.   Speech therapy eval and treat.     Your provider has ordered home care nursing services. If you have not been contacted within 2 days of your discharge please call the inpatient department phone number at [phone] .            Hall Recommendations:  RTC with PCP in 1 week    Marysol Leyva    AVS/Discharge Summary is the source of truth; this is a helpful guide for improved communication of patient story

## 2018-09-09 NOTE — ED TRIAGE NOTES
"Patient here with BLE weakness, back pain and hand tremors that started after \"sliding down\" in the shower on Monday. Reports taking PO flexeril for back.  "

## 2018-09-10 ENCOUNTER — APPOINTMENT (OUTPATIENT)
Dept: MRI IMAGING | Facility: CLINIC | Age: 73
End: 2018-09-10
Attending: EMERGENCY MEDICINE
Payer: MEDICARE

## 2018-09-10 ENCOUNTER — APPOINTMENT (OUTPATIENT)
Dept: PHYSICAL THERAPY | Facility: CLINIC | Age: 73
End: 2018-09-10
Payer: MEDICARE

## 2018-09-10 ENCOUNTER — TELEPHONE (OUTPATIENT)
Dept: FAMILY MEDICINE | Facility: CLINIC | Age: 73
End: 2018-09-10

## 2018-09-10 VITALS
DIASTOLIC BLOOD PRESSURE: 54 MMHG | HEIGHT: 66 IN | SYSTOLIC BLOOD PRESSURE: 127 MMHG | BODY MASS INDEX: 46.93 KG/M2 | OXYGEN SATURATION: 95 % | WEIGHT: 292 LBS | RESPIRATION RATE: 16 BRPM | HEART RATE: 78 BPM | TEMPERATURE: 97.6 F

## 2018-09-10 DIAGNOSIS — M54.6 PAIN IN THORACIC SPINE: ICD-10-CM

## 2018-09-10 DIAGNOSIS — M54.50 LUMBAR PAIN: Primary | ICD-10-CM

## 2018-09-10 PROBLEM — R29.6 FALLS: Status: ACTIVE | Noted: 2018-09-10

## 2018-09-10 LAB
ANION GAP SERPL CALCULATED.3IONS-SCNC: 7 MMOL/L (ref 3–14)
BACTERIA SPEC CULT: NORMAL
BACTERIA SPEC CULT: NORMAL
BASOPHILS # BLD AUTO: 0 10E9/L (ref 0–0.2)
BASOPHILS NFR BLD AUTO: 0.5 %
BUN SERPL-MCNC: 30 MG/DL (ref 7–30)
CALCIUM SERPL-MCNC: 9.9 MG/DL (ref 8.5–10.1)
CHLORIDE SERPL-SCNC: 105 MMOL/L (ref 94–109)
CK SERPL-CCNC: 32 U/L (ref 30–225)
CO2 SERPL-SCNC: 31 MMOL/L (ref 20–32)
CREAT SERPL-MCNC: 1.48 MG/DL (ref 0.52–1.04)
CRP SERPL-MCNC: 6.1 MG/L (ref 0–8)
DIFFERENTIAL METHOD BLD: ABNORMAL
EOSINOPHIL # BLD AUTO: 0.3 10E9/L (ref 0–0.7)
EOSINOPHIL NFR BLD AUTO: 3.3 %
ERYTHROCYTE [DISTWIDTH] IN BLOOD BY AUTOMATED COUNT: 15.8 % (ref 10–15)
ERYTHROCYTE [SEDIMENTATION RATE] IN BLOOD BY WESTERGREN METHOD: 10 MM/H (ref 0–30)
GFR SERPL CREATININE-BSD FRML MDRD: 35 ML/MIN/1.7M2
GLUCOSE BLDC GLUCOMTR-MCNC: 109 MG/DL (ref 70–99)
GLUCOSE BLDC GLUCOMTR-MCNC: 110 MG/DL (ref 70–99)
GLUCOSE BLDC GLUCOMTR-MCNC: 116 MG/DL (ref 70–99)
GLUCOSE BLDC GLUCOMTR-MCNC: 193 MG/DL (ref 70–99)
GLUCOSE SERPL-MCNC: 107 MG/DL (ref 70–99)
HBA1C MFR BLD: 5.2 % (ref 0–5.6)
HCT VFR BLD AUTO: 43.7 % (ref 35–47)
HGB BLD-MCNC: 13.2 G/DL (ref 11.7–15.7)
IMM GRANULOCYTES # BLD: 0 10E9/L (ref 0–0.4)
IMM GRANULOCYTES NFR BLD: 0.1 %
LYMPHOCYTES # BLD AUTO: 2 10E9/L (ref 0.8–5.3)
LYMPHOCYTES NFR BLD AUTO: 25.6 %
Lab: NORMAL
MAGNESIUM SERPL-MCNC: 2 MG/DL (ref 1.6–2.3)
MCH RBC QN AUTO: 25.7 PG (ref 26.5–33)
MCHC RBC AUTO-ENTMCNC: 30.2 G/DL (ref 31.5–36.5)
MCV RBC AUTO: 85 FL (ref 78–100)
MONOCYTES # BLD AUTO: 0.4 10E9/L (ref 0–1.3)
MONOCYTES NFR BLD AUTO: 5.3 %
NEUTROPHILS # BLD AUTO: 5.2 10E9/L (ref 1.6–8.3)
NEUTROPHILS NFR BLD AUTO: 65.2 %
NRBC # BLD AUTO: 0 10*3/UL
NRBC BLD AUTO-RTO: 0 /100
PHOSPHATE SERPL-MCNC: 3.1 MG/DL (ref 2.5–4.5)
PLATELET # BLD AUTO: 133 10E9/L (ref 150–450)
POTASSIUM SERPL-SCNC: 3.9 MMOL/L (ref 3.4–5.3)
RBC # BLD AUTO: 5.14 10E12/L (ref 3.8–5.2)
SODIUM SERPL-SCNC: 143 MMOL/L (ref 133–144)
SPECIMEN SOURCE: NORMAL
WBC # BLD AUTO: 8 10E9/L (ref 4–11)

## 2018-09-10 PROCEDURE — 96374 THER/PROPH/DIAG INJ IV PUSH: CPT

## 2018-09-10 PROCEDURE — G0378 HOSPITAL OBSERVATION PER HR: HCPCS

## 2018-09-10 PROCEDURE — 86140 C-REACTIVE PROTEIN: CPT | Performed by: FAMILY MEDICINE

## 2018-09-10 PROCEDURE — 25000131 ZZH RX MED GY IP 250 OP 636 PS 637: Mod: GY | Performed by: FAMILY MEDICINE

## 2018-09-10 PROCEDURE — 85025 COMPLETE CBC W/AUTO DIFF WBC: CPT | Performed by: FAMILY MEDICINE

## 2018-09-10 PROCEDURE — 84100 ASSAY OF PHOSPHORUS: CPT | Performed by: FAMILY MEDICINE

## 2018-09-10 PROCEDURE — 85652 RBC SED RATE AUTOMATED: CPT | Performed by: FAMILY MEDICINE

## 2018-09-10 PROCEDURE — 25000125 ZZHC RX 250: Performed by: FAMILY MEDICINE

## 2018-09-10 PROCEDURE — 97530 THERAPEUTIC ACTIVITIES: CPT | Mod: GP | Performed by: PHYSICAL THERAPIST

## 2018-09-10 PROCEDURE — 72146 MRI CHEST SPINE W/O DYE: CPT

## 2018-09-10 PROCEDURE — 97110 THERAPEUTIC EXERCISES: CPT | Mod: GP | Performed by: PHYSICAL THERAPIST

## 2018-09-10 PROCEDURE — 00000146 ZZHCL STATISTIC GLUCOSE BY METER IP

## 2018-09-10 PROCEDURE — 72148 MRI LUMBAR SPINE W/O DYE: CPT

## 2018-09-10 PROCEDURE — 80048 BASIC METABOLIC PNL TOTAL CA: CPT | Performed by: FAMILY MEDICINE

## 2018-09-10 PROCEDURE — 83036 HEMOGLOBIN GLYCOSYLATED A1C: CPT | Performed by: FAMILY MEDICINE

## 2018-09-10 PROCEDURE — 82550 ASSAY OF CK (CPK): CPT | Performed by: FAMILY MEDICINE

## 2018-09-10 PROCEDURE — 36415 COLL VENOUS BLD VENIPUNCTURE: CPT | Performed by: FAMILY MEDICINE

## 2018-09-10 PROCEDURE — 25000132 ZZH RX MED GY IP 250 OP 250 PS 637: Mod: GY | Performed by: FAMILY MEDICINE

## 2018-09-10 PROCEDURE — 97161 PT EVAL LOW COMPLEX 20 MIN: CPT | Mod: GP | Performed by: PHYSICAL THERAPIST

## 2018-09-10 PROCEDURE — A9270 NON-COVERED ITEM OR SERVICE: HCPCS | Mod: GY | Performed by: FAMILY MEDICINE

## 2018-09-10 PROCEDURE — 83735 ASSAY OF MAGNESIUM: CPT | Performed by: FAMILY MEDICINE

## 2018-09-10 PROCEDURE — 40000193 ZZH STATISTIC PT WARD VISIT: Performed by: PHYSICAL THERAPIST

## 2018-09-10 PROCEDURE — 97116 GAIT TRAINING THERAPY: CPT | Mod: GP,59 | Performed by: PHYSICAL THERAPIST

## 2018-09-10 RX ORDER — AMOXICILLIN 250 MG
1 CAPSULE ORAL 2 TIMES DAILY
Status: DISCONTINUED | OUTPATIENT
Start: 2018-09-10 | End: 2018-09-10 | Stop reason: HOSPADM

## 2018-09-10 RX ORDER — METOCLOPRAMIDE HYDROCHLORIDE 5 MG/ML
5 INJECTION INTRAMUSCULAR; INTRAVENOUS EVERY 6 HOURS PRN
Status: DISCONTINUED | OUTPATIENT
Start: 2018-09-10 | End: 2018-09-10 | Stop reason: HOSPADM

## 2018-09-10 RX ORDER — LEVOTHYROXINE SODIUM 75 UG/1
150 TABLET ORAL DAILY
Status: DISCONTINUED | OUTPATIENT
Start: 2018-09-10 | End: 2018-09-10 | Stop reason: HOSPADM

## 2018-09-10 RX ORDER — BISACODYL 5 MG
5 TABLET, DELAYED RELEASE (ENTERIC COATED) ORAL DAILY PRN
Status: DISCONTINUED | OUTPATIENT
Start: 2018-09-10 | End: 2018-09-10 | Stop reason: HOSPADM

## 2018-09-10 RX ORDER — POTASSIUM CL/LIDO/0.9 % NACL 10MEQ/0.1L
10 INTRAVENOUS SOLUTION, PIGGYBACK (ML) INTRAVENOUS
Status: DISCONTINUED | OUTPATIENT
Start: 2018-09-10 | End: 2018-09-10 | Stop reason: HOSPADM

## 2018-09-10 RX ORDER — POTASSIUM CHLORIDE 29.8 MG/ML
20 INJECTION INTRAVENOUS
Status: DISCONTINUED | OUTPATIENT
Start: 2018-09-10 | End: 2018-09-10 | Stop reason: HOSPADM

## 2018-09-10 RX ORDER — POTASSIUM CHLORIDE 7.45 MG/ML
10 INJECTION INTRAVENOUS
Status: DISCONTINUED | OUTPATIENT
Start: 2018-09-10 | End: 2018-09-10 | Stop reason: HOSPADM

## 2018-09-10 RX ORDER — PROCHLORPERAZINE MALEATE 5 MG
5 TABLET ORAL EVERY 6 HOURS PRN
Status: DISCONTINUED | OUTPATIENT
Start: 2018-09-10 | End: 2018-09-10 | Stop reason: HOSPADM

## 2018-09-10 RX ORDER — METOPROLOL TARTRATE 25 MG/1
75 TABLET, FILM COATED ORAL 2 TIMES DAILY
Status: DISCONTINUED | OUTPATIENT
Start: 2018-09-10 | End: 2018-09-10 | Stop reason: HOSPADM

## 2018-09-10 RX ORDER — TRAZODONE HYDROCHLORIDE 50 MG/1
150 TABLET, FILM COATED ORAL AT BEDTIME
Status: DISCONTINUED | OUTPATIENT
Start: 2018-09-10 | End: 2018-09-10 | Stop reason: HOSPADM

## 2018-09-10 RX ORDER — NALOXONE HYDROCHLORIDE 0.4 MG/ML
.1-.4 INJECTION, SOLUTION INTRAMUSCULAR; INTRAVENOUS; SUBCUTANEOUS
Status: DISCONTINUED | OUTPATIENT
Start: 2018-09-10 | End: 2018-09-10 | Stop reason: HOSPADM

## 2018-09-10 RX ORDER — BUMETANIDE 1 MG/1
2 TABLET ORAL
Status: DISCONTINUED | OUTPATIENT
Start: 2018-09-10 | End: 2018-09-10 | Stop reason: HOSPADM

## 2018-09-10 RX ORDER — POLYETHYLENE GLYCOL 3350 17 G/17G
17 POWDER, FOR SOLUTION ORAL DAILY PRN
Status: DISCONTINUED | OUTPATIENT
Start: 2018-09-10 | End: 2018-09-10 | Stop reason: HOSPADM

## 2018-09-10 RX ORDER — BISACODYL 5 MG
15 TABLET, DELAYED RELEASE (ENTERIC COATED) ORAL DAILY PRN
Status: DISCONTINUED | OUTPATIENT
Start: 2018-09-10 | End: 2018-09-10 | Stop reason: HOSPADM

## 2018-09-10 RX ORDER — ATORVASTATIN CALCIUM 40 MG/1
40 TABLET, FILM COATED ORAL DAILY
Status: DISCONTINUED | OUTPATIENT
Start: 2018-09-10 | End: 2018-09-10 | Stop reason: HOSPADM

## 2018-09-10 RX ORDER — MAGNESIUM SULFATE HEPTAHYDRATE 40 MG/ML
4 INJECTION, SOLUTION INTRAVENOUS EVERY 4 HOURS PRN
Status: DISCONTINUED | OUTPATIENT
Start: 2018-09-10 | End: 2018-09-10 | Stop reason: HOSPADM

## 2018-09-10 RX ORDER — NIACIN 500 MG/1
500 TABLET, EXTENDED RELEASE ORAL DAILY
Status: DISCONTINUED | OUTPATIENT
Start: 2018-09-10 | End: 2018-09-10 | Stop reason: HOSPADM

## 2018-09-10 RX ORDER — POTASSIUM CHLORIDE 750 MG/1
20-40 TABLET, EXTENDED RELEASE ORAL
Status: DISCONTINUED | OUTPATIENT
Start: 2018-09-10 | End: 2018-09-10 | Stop reason: HOSPADM

## 2018-09-10 RX ORDER — METOCLOPRAMIDE 5 MG/1
5 TABLET ORAL EVERY 6 HOURS PRN
Status: DISCONTINUED | OUTPATIENT
Start: 2018-09-10 | End: 2018-09-10 | Stop reason: HOSPADM

## 2018-09-10 RX ORDER — BUMETANIDE 1 MG/1
2 TABLET ORAL 2 TIMES DAILY
Status: DISCONTINUED | OUTPATIENT
Start: 2018-09-10 | End: 2018-09-10

## 2018-09-10 RX ORDER — ALBUTEROL SULFATE 90 UG/1
1-2 AEROSOL, METERED RESPIRATORY (INHALATION) EVERY 4 HOURS PRN
Status: DISCONTINUED | OUTPATIENT
Start: 2018-09-10 | End: 2018-09-10 | Stop reason: HOSPADM

## 2018-09-10 RX ORDER — CYCLOBENZAPRINE HCL 10 MG
10 TABLET ORAL DAILY PRN
COMMUNITY
End: 2018-09-26

## 2018-09-10 RX ORDER — BISACODYL 5 MG
10 TABLET, DELAYED RELEASE (ENTERIC COATED) ORAL DAILY PRN
Status: DISCONTINUED | OUTPATIENT
Start: 2018-09-10 | End: 2018-09-10 | Stop reason: HOSPADM

## 2018-09-10 RX ORDER — PREGABALIN 25 MG/1
100 CAPSULE ORAL 2 TIMES DAILY
Status: DISCONTINUED | OUTPATIENT
Start: 2018-09-10 | End: 2018-09-10 | Stop reason: HOSPADM

## 2018-09-10 RX ORDER — DEXTROSE MONOHYDRATE 25 G/50ML
25-50 INJECTION, SOLUTION INTRAVENOUS
Status: DISCONTINUED | OUTPATIENT
Start: 2018-09-10 | End: 2018-09-10 | Stop reason: HOSPADM

## 2018-09-10 RX ORDER — REPAGLINIDE 0.5 MG/1
1 TABLET ORAL
Status: DISCONTINUED | OUTPATIENT
Start: 2018-09-10 | End: 2018-09-10 | Stop reason: HOSPADM

## 2018-09-10 RX ORDER — BISACODYL 10 MG
10 SUPPOSITORY, RECTAL RECTAL DAILY PRN
Status: DISCONTINUED | OUTPATIENT
Start: 2018-09-10 | End: 2018-09-10 | Stop reason: HOSPADM

## 2018-09-10 RX ORDER — AMOXICILLIN 250 MG
2 CAPSULE ORAL 2 TIMES DAILY
Status: DISCONTINUED | OUTPATIENT
Start: 2018-09-10 | End: 2018-09-10 | Stop reason: HOSPADM

## 2018-09-10 RX ORDER — ONDANSETRON 4 MG/1
4 TABLET, ORALLY DISINTEGRATING ORAL EVERY 6 HOURS PRN
Status: DISCONTINUED | OUTPATIENT
Start: 2018-09-10 | End: 2018-09-10 | Stop reason: HOSPADM

## 2018-09-10 RX ORDER — ESCITALOPRAM OXALATE 20 MG/1
20 TABLET ORAL EVERY MORNING
Status: DISCONTINUED | OUTPATIENT
Start: 2018-09-10 | End: 2018-09-10 | Stop reason: HOSPADM

## 2018-09-10 RX ORDER — FEBUXOSTAT 40 MG/1
40 TABLET, FILM COATED ORAL EVERY EVENING
COMMUNITY
End: 2019-01-02

## 2018-09-10 RX ORDER — ONDANSETRON 2 MG/ML
4 INJECTION INTRAMUSCULAR; INTRAVENOUS EVERY 6 HOURS PRN
Status: DISCONTINUED | OUTPATIENT
Start: 2018-09-10 | End: 2018-09-10 | Stop reason: HOSPADM

## 2018-09-10 RX ORDER — FEBUXOSTAT 40 MG/1
40 TABLET, FILM COATED ORAL EVERY EVENING
Status: DISCONTINUED | OUTPATIENT
Start: 2018-09-10 | End: 2018-09-10 | Stop reason: HOSPADM

## 2018-09-10 RX ORDER — LOSARTAN POTASSIUM 25 MG/1
25 TABLET ORAL DAILY
Status: DISCONTINUED | OUTPATIENT
Start: 2018-09-10 | End: 2018-09-10 | Stop reason: HOSPADM

## 2018-09-10 RX ORDER — POTASSIUM CHLORIDE 1.5 G/1.58G
20-40 POWDER, FOR SOLUTION ORAL
Status: DISCONTINUED | OUTPATIENT
Start: 2018-09-10 | End: 2018-09-10 | Stop reason: HOSPADM

## 2018-09-10 RX ORDER — ACETAMINOPHEN 500 MG
1000 TABLET ORAL EVERY 8 HOURS PRN
Status: DISCONTINUED | OUTPATIENT
Start: 2018-09-10 | End: 2018-09-10 | Stop reason: HOSPADM

## 2018-09-10 RX ORDER — CYCLOBENZAPRINE HCL 10 MG
10 TABLET ORAL DAILY PRN
Status: DISCONTINUED | OUTPATIENT
Start: 2018-09-10 | End: 2018-09-10

## 2018-09-10 RX ORDER — PROCHLORPERAZINE 25 MG
12.5 SUPPOSITORY, RECTAL RECTAL EVERY 12 HOURS PRN
Status: DISCONTINUED | OUTPATIENT
Start: 2018-09-10 | End: 2018-09-10 | Stop reason: HOSPADM

## 2018-09-10 RX ORDER — NICOTINE POLACRILEX 4 MG
15-30 LOZENGE BUCCAL
Status: DISCONTINUED | OUTPATIENT
Start: 2018-09-10 | End: 2018-09-10 | Stop reason: HOSPADM

## 2018-09-10 RX ORDER — AMOXICILLIN 250 MG
1-2 CAPSULE ORAL 2 TIMES DAILY PRN
Status: DISCONTINUED | OUTPATIENT
Start: 2018-09-10 | End: 2018-09-10 | Stop reason: HOSPADM

## 2018-09-10 RX ORDER — POTASSIUM CHLORIDE 750 MG/1
40 TABLET, EXTENDED RELEASE ORAL 3 TIMES DAILY
Status: DISCONTINUED | OUTPATIENT
Start: 2018-09-10 | End: 2018-09-10 | Stop reason: HOSPADM

## 2018-09-10 RX ADMIN — LEVOTHYROXINE SODIUM 150 MCG: 75 TABLET ORAL at 08:24

## 2018-09-10 RX ADMIN — ACETAMINOPHEN 1000 MG: 500 TABLET, FILM COATED ORAL at 16:29

## 2018-09-10 RX ADMIN — SENNOSIDES AND DOCUSATE SODIUM 1 TABLET: 8.6; 5 TABLET ORAL at 08:29

## 2018-09-10 RX ADMIN — REPAGLINIDE 1 MG: 0.5 TABLET ORAL at 10:30

## 2018-09-10 RX ADMIN — MAGNESIUM SULFATE HEPTAHYDRATE 2 G: 500 INJECTION, SOLUTION INTRAMUSCULAR; INTRAVENOUS at 14:14

## 2018-09-10 RX ADMIN — INSULIN ASPART 2 UNITS: 100 INJECTION, SOLUTION INTRAVENOUS; SUBCUTANEOUS at 11:47

## 2018-09-10 RX ADMIN — ASPIRIN 325 MG: 325 TABLET, DELAYED RELEASE ORAL at 08:26

## 2018-09-10 RX ADMIN — TRAZODONE HYDROCHLORIDE 150 MG: 50 TABLET ORAL at 05:56

## 2018-09-10 RX ADMIN — NIACIN 500 MG: 500 TABLET, EXTENDED RELEASE ORAL at 10:30

## 2018-09-10 RX ADMIN — REPAGLINIDE 1 MG: 0.5 TABLET ORAL at 13:04

## 2018-09-10 RX ADMIN — INSULIN HUMAN 35 UNITS: 100 INJECTION, SUSPENSION SUBCUTANEOUS at 11:49

## 2018-09-10 RX ADMIN — POTASSIUM CHLORIDE 40 MEQ: 750 TABLET, EXTENDED RELEASE ORAL at 14:10

## 2018-09-10 RX ADMIN — PREGABALIN 100 MG: 25 CAPSULE ORAL at 08:13

## 2018-09-10 RX ADMIN — LOSARTAN POTASSIUM 25 MG: 25 TABLET ORAL at 08:26

## 2018-09-10 RX ADMIN — ACETAMINOPHEN 1000 MG: 500 TABLET, FILM COATED ORAL at 05:56

## 2018-09-10 RX ADMIN — BUMETANIDE 2 MG: 1 TABLET ORAL at 08:24

## 2018-09-10 RX ADMIN — POTASSIUM CHLORIDE 40 MEQ: 750 TABLET, EXTENDED RELEASE ORAL at 08:19

## 2018-09-10 RX ADMIN — METOPROLOL TARTRATE 75 MG: 25 TABLET ORAL at 08:24

## 2018-09-10 RX ADMIN — ESCITALOPRAM OXALATE 20 MG: 20 TABLET ORAL at 08:24

## 2018-09-10 RX ADMIN — POTASSIUM CHLORIDE 20 MEQ: 750 TABLET, EXTENDED RELEASE ORAL at 10:31

## 2018-09-10 RX ADMIN — BUMETANIDE 2 MG: 1 TABLET ORAL at 16:29

## 2018-09-10 ASSESSMENT — ENCOUNTER SYMPTOMS
HEADACHES: 0
SHORTNESS OF BREATH: 0
NAUSEA: 0
FEVER: 0
DYSURIA: 0
DIFFICULTY URINATING: 0
ACTIVITY CHANGE: 1
CONFUSION: 0
COUGH: 0
ABDOMINAL PAIN: 0
BACK PAIN: 1

## 2018-09-10 ASSESSMENT — ACTIVITIES OF DAILY LIVING (ADL)
BATHING: 2-->ASSISTIVE PERSON
RETIRED_COMMUNICATION: 0-->UNDERSTANDS/COMMUNICATES WITHOUT DIFFICULTY
AMBULATION: 1-->ASSISTIVE EQUIPMENT
FALL_HISTORY_WITHIN_LAST_SIX_MONTHS: YES
TRANSFERRING: 1-->ASSISTIVE EQUIPMENT
NUMBER_OF_TIMES_PATIENT_HAS_FALLEN_WITHIN_LAST_SIX_MONTHS: 6
ADLS_ACUITY_SCORE: 24
SWALLOWING: 2-->DIFFICULTY SWALLOWING LIQUIDS
RETIRED_EATING: 0-->INDEPENDENT
COGNITION: 0 - NO COGNITION ISSUES REPORTED
DRESS: 2-->ASSISTIVE PERSON
WHICH_OF_THE_ABOVE_FUNCTIONAL_RISKS_HAD_A_RECENT_ONSET_OR_CHANGE?: FALL HISTORY;AMBULATION
TOILETING: 0-->INDEPENDENT
ADLS_ACUITY_SCORE: 24

## 2018-09-10 NOTE — PROGRESS NOTES
[facility], [facility] Progress Note    Main Plans for Today   - PT eval  - OT eval   - Plan to discharge to TCU, likely tomorrow pending placement  - Medically stable for discharge    Assessment & Plan   Mariel Ribeiro is a 73 year old female admitted on 9/9/2018. She has a history significant for T2DM, COPD, CAD s/p  2x LAD stenting in 2011 and 3x CABG 7/2/18, CHF w/ EF 55-60% stage III CKD, HLD, fibromyalgia, lymphedema, hypothyroidism who is admitted for lower extremity weakness and lower back pain. Disposition likely not safe to return home and would likely benefit from TCU/Rehab placement.       # LBP, chronic  # Lower extremities weakness, bilateral  Likely overall deconditioning. MRI findings suggest chronic L2-3 and L4-5 canal stenosis. Noticeable after recent fall 1 week ago.  - Physical therapy eval (administer 650mg tylenol 1 hour prior to physical therapy to eval for lower extremity muscle weakness)  - Continue Lyrica 100 mg twice a day  - Continue tizanidine 4 mg at bedtime      # SPENSER with CKD III  Baseline Cr of 1.40-1.59, and BUN at 28-34. Now with 1.62/36 respectively. No UTI symptoms however noted to have large LE on UA.   - LR bolus of 500cc (gentle given cardiac hx)  - Avoid nephrotoxic agents     # HTN  # CAD s/p 2x LAD stent 2011 and CABG 7/2/18  Last echo 8/15/18: EF of 40-45% with mild LV hypokinesis.   - continue PTA aspirin 325 daily   - continue PTA atorvastatin 40mg daily   - continue PTA metoprolol 75mg BID  - continue PTA losartan 25mg daily   - Continue PTA Bumex 2 mg twice daily     # T2DM  - F/u HbA1C (last checked 7/1/18 - 9.3)  - Medium intensity sliding scale  - Continue PTA isophane Humulin, 35 units twice a day     # Hypothyroidism  - Continued PTA levothyroxine 150mcg daily      # Depression  - Continue trazodone 150 mg at bedtime  - Continue Lexapro 20 mg daily     # Gout  - Continue PTA febuxostat 40 mg daily    # Pain  Assessment:  Current Pain Score 9/10/2018   Patient currently in pain? yes   Pain score (0-10) -   Pain location -   Pain descriptors -   CPOT pain score -   - Mariel is experiencing pain due to chronic back pain. Pain management was discussed and the plan was created in a collaborative fashion.  Mariel's response to the current recommendations: engaged  - Tylenol PRN    Diet: Combination Diet Regular Diet Adult; Low Fat Diet  Fluids: PO  DVT Prophylaxis: Pneumatic Compression Devices  Code Status: Full Code    Disposition Plan   Expected discharge:Expected Discharge Date: 09/11/18 Tomorrow, recommended to transitional care unit once safe disposition plan/ TCU bed available.Dispo: Expected Discharge Date: 09/11/18      Entered: Shayna Wiggins 09/10/2018, 3:07 PM   Information in the above section will display in the discharge planner report.      The patient's care was discussed with the Attending Physician, Dr. Amaury White, Bedside Nurse, Care Coordinator/ and Patient.    Shayna Wiggins  [facility]:   Pager: [phone]  Please see sticky note for cross cover information    Interval History   Review of Systems   Constitutional: Positive for activity change. Negative for fever.   Respiratory: Negative for cough and shortness of breath.    Cardiovascular: Negative for chest pain and leg swelling.   Gastrointestinal: Negative for abdominal pain and nausea.   Genitourinary: Negative for difficulty urinating and dysuria.   Musculoskeletal: Positive for back pain and gait problem.   Neurological: Negative for headaches.   Psychiatric/Behavioral: Negative for confusion.     Physical Exam   Vital Signs: Temp: 98.2  F (36.8  C) Temp src: Oral BP: 116/62 Pulse: 77   Resp: 16 SpO2: 93 % O2 Device: None (Room air) Oxygen Delivery: 1 LPM  Weight: 292 lbs 0 oz  Physical Exam   Constitutional: She is oriented to person, place, and time. No distress.   obese   HENT:   Mouth/Throat:  Oropharynx is clear and moist.   Eyes: Conjunctivae are normal. Right eye exhibits no discharge. Left eye exhibits no discharge.   Cardiovascular: Normal rate and regular rhythm.    No murmur heard.  Pulmonary/Chest: Effort normal. No respiratory distress. She has no wheezes.   Abdominal: Soft. She exhibits no distension.   Musculoskeletal: She exhibits edema.   Neurological: She is alert and oriented to person, place, and time.   Skin: No rash noted. She is not diaphoretic.   Psychiatric: Her affect is blunt. Her speech is delayed. She is slowed. Cognition and memory are normal.   Nursing note and vitals reviewed.      Data   Medications       aspirin  325 mg Oral Daily     atorvastatin  40 mg Oral Daily     bumetanide  2 mg Oral BID     escitalopram  20 mg Oral QAM     febuxostat  40 mg Oral QPM     insulin aspart  1-7 Units Subcutaneous TID AC     insulin aspart  1-5 Units Subcutaneous At Bedtime     insulin isophane human  35 Units Subcutaneous BID     levothyroxine  150 mcg Oral Daily     losartan  25 mg Oral Daily     metoprolol tartrate  75 mg Oral BID     niacin  500 mg Oral Daily     potassium chloride SA  40 mEq Oral TID     pregabalin  100 mg Oral BID     repaglinide  1 mg Oral TID AC     senna-docusate  1 tablet Oral BID    Or     senna-docusate  2 tablet Oral BID     tiZANidine (ZANAFLEX) tablet 4 mg  4 mg Oral At Bedtime     traZODone  150 mg Oral At Bedtime     Data     Recent Labs  Lab 09/10/18  0614 09/09/18  1820 09/06/18  1321   WBC 8.0 7.9  --    HGB 13.2 12.4  --    MCV 85 86  --    * 125*  --     141 139   POTASSIUM 3.9 4.4 3.6   CHLORIDE 105 103 101   CO2 31 31 33*   BUN 30 36* 34*   CR 1.48* 1.62* 1.59*   ANIONGAP 7 7 5   ANTOINETTE 9.9 9.3 10.0   * 65* 104*     Recent Results (from the past 24 hour(s))   Cervical spine MRI w/o contrast    Narrative    MR LUMBAR SPINE W/O CONTRAST, MR THORACIC SPINE W/O CONTRAST, MR  CERVICAL SPINE W/O CONTRAST 9/10/2018 2:00 AM    History: fall,  low back pain, incontinence and saddle anesthesia.    Comparison: CT chest 6/26/2018    Technique:  Cervical spine: Sagittal T1-weighted, sagittal T2-weighted, sagittal  STIR, sagittal diffusion-weighted, axial T1-weighted, and axial  T2-weighted images of the cervical spine were obtained without the  administration of intravenous contrast.  Thoracic spine: Sagittal T1-weighted, sagittal T2-weighted, sagittal  STIR, sagittal diffusion weighted, axial T1-weighted, and axial  T2-weighted images of the thoracic spine were obtained without the  administration of intravenous contrast.  Lumbar spine: Sagittal T1-weighted, sagittal T2-weighted, sagittal  STIR, axial T1-weighted, and axial T2-weighted images of the lumbar  spine were obtained without the administration of intravenous  contrast.    Findings:   CERVICAL:  Mild straightening of the cervical lordosis. Trace  retrolisthesis C4-5, likely degenerative. No evidence of traumatic  subluxation. Bone marrow signal intensity appears normal. No osseous  edema on STIR images. No abnormal or myelopathic signal in the  cervical spinal cord. On a level by level basis:    C2-3: No spinal canal or neural foraminal stenosis.    C3-4: Disc-osteophyte complex with trace narrowing of the spinal  canal. Uncinate and facet hypertrophy with mild left neural foraminal  stenosis.    C4-5: Disc-osteophyte complex with mild spinal canal stenosis.  Uncinate and facet hypertrophy with mild left neural foraminal  stenosis.    C5-6: Superiorly migrated disc extrusion. Moderate spinal canal  stenosis. Uncinate and facet hypertrophy with mild left neural  foraminal stenosis.    C6-7: Disc-osteophyte complex with mild spinal canal stenosis.  Uncinate and facet hypertrophy with mild left neural foraminal  stenosis.    C7-T1: No spinal canal or neural foraminal stenosis.    THORACIC: The external marker is at the level of T7. The thoracic  vertebrae are in normal alignment.  Bone marrow signal  intensity  appears normal. There is no abnormal signal within the thoracic spinal  cord. Mild multilevel degenerative changes. No significant spinal  canal stenosis. No severe neural foraminal stenosis.    LUMBAR: There are five lumbar-type vertebrae identified. The tip of  the conus medullaris is at L1. The lumbar vertebral column are  normally aligned.  Bone marrow signal intensity appears normal. There  is multilevel disc height narrowing greatest at L2-L3. On a level by  level basis:    L1-2: No spinal canal stenosis. Facet hypertrophy with mild right  bilateral neural foraminal stenosis, greater on the left.    L2-3: Disc osteophyte complex and ligamentum flavum hypertrophy  resulting in moderate-severe spinal canal stenosis. Moderate bilateral  neural foraminal stenosis.    L3-4: Disc osteophyte complex with ligamentum flavum thickening  resulting in mild-moderate spinal canal stenosis. Mild to moderate  left and mild right neural foraminal stenosis.    L4-5: Disc osteophyte complex with ligamentum flavum thickening  resulting in moderate spinal canal stenosis. Mild right and moderate  left neural foraminal stenosis.     L5-S1: No spinal canal or neural foraminal stenosis.    Normal paraspinous soft tissues.      Impression    Impression:  1. No evidence of traumatic spinal injury. Normal cervicothoracic  spinal cord and cauda equina nerve roots.  2. Multilevel spondylosis.  3. Moderate to severe spinal canal stenosis at L2-3 and moderate  spinal canal stenosis at C5-6 and L4-5.  4. Moderate bilateral neural foraminal stenosis at L2-3 and on the  left at L4-5.  5. Widely patent thoracic spinal canal and neural foramina.    I have personally reviewed the examination and initial interpretation  and I agree with the findings.    LINSEY MACDONALD MD   MR Thoracic Spine w/o Contrast    Narrative    MR LUMBAR SPINE W/O CONTRAST, MR THORACIC SPINE W/O CONTRAST, MR  CERVICAL SPINE W/O CONTRAST 9/10/2018 2:00  AM    History: fall, low back pain, incontinence and saddle anesthesia.    Comparison: CT chest 6/26/2018    Technique:  Cervical spine: Sagittal T1-weighted, sagittal T2-weighted, sagittal  STIR, sagittal diffusion-weighted, axial T1-weighted, and axial  T2-weighted images of the cervical spine were obtained without the  administration of intravenous contrast.  Thoracic spine: Sagittal T1-weighted, sagittal T2-weighted, sagittal  STIR, sagittal diffusion weighted, axial T1-weighted, and axial  T2-weighted images of the thoracic spine were obtained without the  administration of intravenous contrast.  Lumbar spine: Sagittal T1-weighted, sagittal T2-weighted, sagittal  STIR, axial T1-weighted, and axial T2-weighted images of the lumbar  spine were obtained without the administration of intravenous  contrast.    Findings:   CERVICAL:  Mild straightening of the cervical lordosis. Trace  retrolisthesis C4-5, likely degenerative. No evidence of traumatic  subluxation. Bone marrow signal intensity appears normal. No osseous  edema on STIR images. No abnormal or myelopathic signal in the  cervical spinal cord. On a level by level basis:    C2-3: No spinal canal or neural foraminal stenosis.    C3-4: Disc-osteophyte complex with trace narrowing of the spinal  canal. Uncinate and facet hypertrophy with mild left neural foraminal  stenosis.    C4-5: Disc-osteophyte complex with mild spinal canal stenosis.  Uncinate and facet hypertrophy with mild left neural foraminal  stenosis.    C5-6: Superiorly migrated disc extrusion. Moderate spinal canal  stenosis. Uncinate and facet hypertrophy with mild left neural  foraminal stenosis.    C6-7: Disc-osteophyte complex with mild spinal canal stenosis.  Uncinate and facet hypertrophy with mild left neural foraminal  stenosis.    C7-T1: No spinal canal or neural foraminal stenosis.    THORACIC: The external marker is at the level of T7. The thoracic  vertebrae are in normal alignment.   Bone marrow signal intensity  appears normal. There is no abnormal signal within the thoracic spinal  cord. Mild multilevel degenerative changes. No significant spinal  canal stenosis. No severe neural foraminal stenosis.    LUMBAR: There are five lumbar-type vertebrae identified. The tip of  the conus medullaris is at L1. The lumbar vertebral column are  normally aligned.  Bone marrow signal intensity appears normal. There  is multilevel disc height narrowing greatest at L2-L3. On a level by  level basis:    L1-2: No spinal canal stenosis. Facet hypertrophy with mild right  bilateral neural foraminal stenosis, greater on the left.    L2-3: Disc osteophyte complex and ligamentum flavum hypertrophy  resulting in moderate-severe spinal canal stenosis. Moderate bilateral  neural foraminal stenosis.    L3-4: Disc osteophyte complex with ligamentum flavum thickening  resulting in mild-moderate spinal canal stenosis. Mild to moderate  left and mild right neural foraminal stenosis.    L4-5: Disc osteophyte complex with ligamentum flavum thickening  resulting in moderate spinal canal stenosis. Mild right and moderate  left neural foraminal stenosis.     L5-S1: No spinal canal or neural foraminal stenosis.    Normal paraspinous soft tissues.      Impression    Impression:  1. No evidence of traumatic spinal injury. Normal cervicothoracic  spinal cord and cauda equina nerve roots.  2. Multilevel spondylosis.  3. Moderate to severe spinal canal stenosis at L2-3 and moderate  spinal canal stenosis at C5-6 and L4-5.  4. Moderate bilateral neural foraminal stenosis at L2-3 and on the  left at L4-5.  5. Widely patent thoracic spinal canal and neural foramina.    I have personally reviewed the examination and initial interpretation  and I agree with the findings.    LINSEY MACDONALD MD   Lumbar spine MRI w/o contrast    Narrative    MR LUMBAR SPINE W/O CONTRAST, MR THORACIC SPINE W/O CONTRAST, MR  CERVICAL SPINE W/O CONTRAST  9/10/2018 2:00 AM    History: fall, low back pain, incontinence and saddle anesthesia.    Comparison: CT chest 6/26/2018    Technique:  Cervical spine: Sagittal T1-weighted, sagittal T2-weighted, sagittal  STIR, sagittal diffusion-weighted, axial T1-weighted, and axial  T2-weighted images of the cervical spine were obtained without the  administration of intravenous contrast.  Thoracic spine: Sagittal T1-weighted, sagittal T2-weighted, sagittal  STIR, sagittal diffusion weighted, axial T1-weighted, and axial  T2-weighted images of the thoracic spine were obtained without the  administration of intravenous contrast.  Lumbar spine: Sagittal T1-weighted, sagittal T2-weighted, sagittal  STIR, axial T1-weighted, and axial T2-weighted images of the lumbar  spine were obtained without the administration of intravenous  contrast.    Findings:   CERVICAL:  Mild straightening of the cervical lordosis. Trace  retrolisthesis C4-5, likely degenerative. No evidence of traumatic  subluxation. Bone marrow signal intensity appears normal. No osseous  edema on STIR images. No abnormal or myelopathic signal in the  cervical spinal cord. On a level by level basis:    C2-3: No spinal canal or neural foraminal stenosis.    C3-4: Disc-osteophyte complex with trace narrowing of the spinal  canal. Uncinate and facet hypertrophy with mild left neural foraminal  stenosis.    C4-5: Disc-osteophyte complex with mild spinal canal stenosis.  Uncinate and facet hypertrophy with mild left neural foraminal  stenosis.    C5-6: Superiorly migrated disc extrusion. Moderate spinal canal  stenosis. Uncinate and facet hypertrophy with mild left neural  foraminal stenosis.    C6-7: Disc-osteophyte complex with mild spinal canal stenosis.  Uncinate and facet hypertrophy with mild left neural foraminal  stenosis.    C7-T1: No spinal canal or neural foraminal stenosis.    THORACIC: The external marker is at the level of T7. The thoracic  vertebrae are in  normal alignment.  Bone marrow signal intensity  appears normal. There is no abnormal signal within the thoracic spinal  cord. Mild multilevel degenerative changes. No significant spinal  canal stenosis. No severe neural foraminal stenosis.    LUMBAR: There are five lumbar-type vertebrae identified. The tip of  the conus medullaris is at L1. The lumbar vertebral column are  normally aligned.  Bone marrow signal intensity appears normal. There  is multilevel disc height narrowing greatest at L2-L3. On a level by  level basis:    L1-2: No spinal canal stenosis. Facet hypertrophy with mild right  bilateral neural foraminal stenosis, greater on the left.    L2-3: Disc osteophyte complex and ligamentum flavum hypertrophy  resulting in moderate-severe spinal canal stenosis. Moderate bilateral  neural foraminal stenosis.    L3-4: Disc osteophyte complex with ligamentum flavum thickening  resulting in mild-moderate spinal canal stenosis. Mild to moderate  left and mild right neural foraminal stenosis.    L4-5: Disc osteophyte complex with ligamentum flavum thickening  resulting in moderate spinal canal stenosis. Mild right and moderate  left neural foraminal stenosis.     L5-S1: No spinal canal or neural foraminal stenosis.    Normal paraspinous soft tissues.      Impression    Impression:  1. No evidence of traumatic spinal injury. Normal cervicothoracic  spinal cord and cauda equina nerve roots.  2. Multilevel spondylosis.  3. Moderate to severe spinal canal stenosis at L2-3 and moderate  spinal canal stenosis at C5-6 and L4-5.  4. Moderate bilateral neural foraminal stenosis at L2-3 and on the  left at L4-5.  5. Widely patent thoracic spinal canal and neural foramina.    I have personally reviewed the examination and initial interpretation  and I agree with the findings.    LINSEY MACDONALD MD

## 2018-09-10 NOTE — PLAN OF CARE
Problem: Patient Care Overview  Goal: Individualization & Mutuality  Outcome: Improving  VSS and pt afebrile. Oxygen saturations were 91% in the am, order obtained to keep them above 90% with spot checking. Pt has a history of Sleep Apnea. Pt was 93% on RA while awake.   Lyrica given for pain as scheduled.  No additional medications requested by the patient.   She at 100 % of one meal today. Blood glucose monitored and corrected as charted. We are not actively changing anything with that.   PT evaluated pt and OT was also ordered. Her Lymphedema wraps remain on from admission. MDs aware.   Extra Potassium replacement of 20 mEq given at 1030 per am dose given at 0830. Recheck is in the am.   Magnesium 2 grams also given per protocol. Recheck in the am.   Good UOP. No bowel movement.

## 2018-09-10 NOTE — TELEPHONE ENCOUNTER
Reason for Call:  Medication or medication refill:    Do you use a [facility]?  Name of the pharmacy and phone number for the current request:  [id number] - [place], [place] - [address] AT [facility] [address]    Name of the medication requested: insulin isophane human (HUMULIN N PEN) 100 UNIT/ML [id number]    Other request: Patient's roommate is requesting a refill on the medication above. States it was initially prescribed in the hospital and she will be out in 2 days. Please assist. Thanks!    Can we leave a detailed message on this number? YES    Phone number patient can be reached at: Other phone number: [phone]    Best Time: Any    Call taken on 9/10/2018 at 2:51 PM by Korin Castro

## 2018-09-10 NOTE — PHARMACY-ADMISSION MEDICATION HISTORY
"Admission medication history interview status for the 9/9/2018 admission is complete. See Epic admission navigator for allergy information, pharmacy, prior to admission medications and immunization status.     Medication history interview sources:  Family member with medication list    Changes made to PTA medication list (reason)  Added: cyclobenzaprine - has old prescription and still uses occasionally  Deleted: None  Changed: Humulin N 35 units daily --> 35 units BID    Additional medication history information (including reliability of information, actions taken by pharmacist):  -Patient sleeping for majority of interview, but was awakened to confirm insulin doses  -All other medications reviewed with family member who was present and is involved with helping patient set up meds  -When patient uses cyclobenzaprine, she has been instructed not take trazodone or tizanidine - otherwise takes these scheduled at bedtime  -Meds that have a frequency of daily but are marked as \"past week\" were identified that was as it wasn't clear whether pt had taken them yet the day of presentation or the previous evening  -Needs new rx for Humulin pens   -Reported good pain relief with gabapentin/ketamine compounded cream but has run out and is cost prohibitive  -Reports using albuterol almost daily      Prior to Admission medications    Medication Sig Last Dose Taking? Auth Provider   acetaminophen (TYLENOL) 325 MG tablet Take 2 tablets (650 mg) by mouth every 4 hours as needed for mild pain  Patient taking differently: Take 650 mg by mouth every 6 hours as needed for mild pain  Past Week at Unknown time Yes Kb Jeffries PA-C   albuterol (PROAIR HFA) 108 (90 Base) MCG/ACT inhaler Inhale 1-2 puffs into the lungs every 4 hours as needed for wheezing Past Week at Unknown time Yes Rosina Blount, NP   aspirin  MG tablet Take 1 tablet by mouth daily. 9/9/2018 at AM Yes Dakota Shankar MD   atorvastatin (LIPITOR) " 40 MG tablet Take 1 tablet (40 mg) by mouth daily Past Week at Unknown time Yes Yana Hameed MD   bumetanide (BUMEX) 2 MG tablet Take 1 tablet (2 mg) by mouth 2 times daily 9/9/2018 at AM Yes Cathi Vaughn APRN CNP   clotrimazole (LOTRIMIN) 1 % cream Apply topically 2 times daily Past Week at Unknown time Yes Jefry Camargo MD   cyclobenzaprine (FLEXERIL) 10 MG tablet Take 10 mg by mouth daily as needed for muscle spasms Does not take tizanidine or trazodone if using cyclobenzaprine 9/8/2018 at PM Yes Unknown, Entered By History   escitalopram (LEXAPRO) 20 MG tablet Take 1 tablet (20 mg) by mouth every morning 9/9/2018 at AM Yes Yana Hameed MD   febuxostat (ULORIC) 40 MG TABS tablet Take 40 mg by mouth every evening 9/8/2018 at PM Yes Unknown, Entered By History   fluconazole (DIFLUCAN) 150 MG tablet Take 1 tablet (150 mg) by mouth every 3 days Past Week at Unknown time Yes Rosina Blount NP   FOLIC ACID PO Take 1 mg by mouth daily  Past Week at Unknown time Yes Reported, Patient   guaiFENesin (MUCINEX) 600 MG 12 hr tablet Take 1 tablet (600 mg) by mouth 2 times daily 9/9/2018 at AM Yes Meet Pan MD   insulin isophane human (HUMULIN N PEN) 100 UNIT/ML injection Inject 35 Units Subcutaneous 2 times daily 9/9/2018 at AM Yes Unknown, Entered By History   ketamine, KETALAR, 5%-gabapentin, NEURONTIN, 8% 5%-8% GEL topical PLO cream Apply 30 g topically 3 times daily Past Month at Unknown time Yes Rosina Blount NP   levothyroxine (SYNTHROID/LEVOTHROID) 150 MCG tablet Take 1 tablet (150 mcg) by mouth daily 9/9/2018 at AM Yes Rosina Blount NP   losartan (COZAAR) 25 MG tablet Take 1 tablet (25 mg) by mouth daily 9/9/2018 at AM Yes Starla Saez NP   Metoprolol Tartrate 75 MG TABS Take 75 mg by mouth 2 times daily 9/9/2018 at AM Yes Rafaela William MD   miconazole (MICATIN; MICRO GUARD) 2 % powder Apply topically 2 times daily Past Week at Unknown time Yes Raman  Jefry HOU MD   niacin (NIASPAN) 500 MG CR tablet Take 500 mg by mouth daily  Past Week at Unknown time Yes Reported, Patient   potassium chloride SA (K-DUR/KLOR-CON M) 10 MEQ CR tablet Take 4 tablets (40 mEq) by mouth 3 times daily 9/9/2018 at AM x1 dose Yes Starla Saez NP   pregabalin (LYRICA) 100 MG capsule Take 1 capsule (100 mg) by mouth 2 times daily 9/9/2018 at Am Yes Yana Hameed MD   repaglinide (PRANDIN) 1 MG tablet Take 1 tablet (1 mg) by mouth 3 times daily (before meals) 9/9/2018 at AM Yes Rfaaela William MD   senna-docusate (SENOKOT-S;PERICOLACE) 8.6-50 MG per tablet Take 1-2 tablets by mouth 2 times daily as needed for constipation  Patient taking differently: Take 1-2 tablets by mouth 2 times daily  9/9/2018 at AM Yes Yana Hameed MD   TIZANIDINE HCL PO Take 4 mg by mouth At Bedtime  Past Week at Unknown time Yes Reported, Patient   traZODone (DESYREL) 50 MG tablet Take 3 tablets (150 mg) by mouth At Bedtime Past Week at Unknown time Yes Rafaela William MD   VITAMIN D, CHOLECALCIFEROL, PO Take 10,000 Units by mouth daily 9/10/2018 at AM Yes Reported, Patient   B-D U/F insulin pen needle USE FOR INSULIN INJECTION  at Unknown time  Rafaela William MD   blood glucose monitoring (NO BRAND SPECIFIED) test strip 1 strip by In Vitro route 2 times daily Strips per patient's preference   Rafaela William MD   EPINEPHrine (EPIPEN/ADRENACLICK/OR ANY BX GENERIC EQUIV) 0.3 MG/0.3ML injection 2-pack Inject 0.3 mLs (0.3 mg) into the muscle once as needed for anaphylaxis   Rafaela William MD   ferrous gluconate (FERGON) 324 (38 Fe) MG tablet Take 1 tablet (324 mg) by mouth Every Mon, Wed, Fri Morning 9/7/2018 at AM  Jefry Camargo MD   insulin syringes, disposable, U-100 1 ML MISC 1 each 5 times daily  Patient not taking: Reported on 9/6/2018   Rafaela William MD   [employer] FINEPOINT LANCETS MISC Use to test blood sugars 2 times daily or as directed.  Patient taking  differently: Use to test blood sugars 5 times daily or as directed.   Rafaela William MD   nitroGLYcerin (NITROSTAT) 0.4 MG sublingual tablet For chest pain place 1 tablet under the tongue every 5 minutes for 3 doses. If symptoms persist 5 minutes after 1st dose call 911.   Yana Hameed MD   ONE TOUCH ULTRA (DEVICES) MISC test blood sugar BID  Patient taking differently: test blood sugar 5 times per day   Rosina Blount, NP   polyethylene glycol (MIRALAX/GLYCOLAX) Packet Take 17 g by mouth daily  Patient taking differently: Take 17 g by mouth daily as needed    Yana Hameed MD     Medication history completed by:   Divina Lowry, CaritoD

## 2018-09-10 NOTE — DISCHARGE SUMMARY
[facility], [facility] Discharge Summary       Date of Admission:  9/9/2018  Date of Discharge:  9/10/2018  Date of Service: 9/10/2018  Discharging Attending Provider: Dr. Amaury White  Discharge Team: [facility] Service    Discharge Diagnoses      Acute midline low back pain, with sciatica presence unspecified  Diabetic hypoglycemia (H)  Encounter for long-term (current) use of insulin (H)  Falls frequently  Generalized muscle weakness  Type 2 diabetes mellitus with stage 3 chronic kidney disease, with long-term current use of insulin (H)    Follow-ups Needed After Discharge   1. Follow-up with PCP for post-hospitalization visit, and to discuss on-going care with physical therapy.    Hospital Course   Mariel Ribeiro was admitted on 9/9/2018 for lower extremity weakness and lower back pain.       # LBP, chronic  # Lower extremities weakness, bilateral  Likely overall deconditioning. MRI findings suggest chronic L2-3 and L4-5 canal stenosis. Noticeable after recent fall 1 week ago. Physical therapy eval suggested TCU, which our team agreed with. However, given patient's observation status, patient is not eligible for insurance coverage of TCU stay. Social work discussed options with patient, and unfortunately patient unable to pay out of pocket for TCU. While we feel it would be safest to discharge to a TCU, patient has medical decision making capacity and understands the risks and benefits of discharging home.  - Continue tylenol PRN  - Continue Lyrica 100 mg twice a day  - Continue tizanidine 4 mg at bedtime      # SPENSER with CKD III  Baseline Cr of 1.40-1.59, and BUN at 28-34. Now with 1.62/36 respectively. No UTI symptoms however noted to have large LE on UA.      # HTN  # CAD s/p 2x LAD stent 2011 and CABG 7/2/18  Last echo 8/15/18: EF of 40-45% with mild LV hypokinesis.   - continue PTA aspirin 325 daily   - continue PTA atorvastatin 40mg daily   -  continue PTA metoprolol 75mg BID  - continue PTA losartan 25mg daily   - Continue PTA Bumex 2 mg twice daily      # T2DM  Hgb A1c checked on admission: 5.2  - Continue PTA isophane Humulin, 35 units twice a day      # Hypothyroidism  - Continued PTA levothyroxine 150mcg daily       # Depression  - Continue trazodone 150 mg at bedtime  - Continue Lexapro 20 mg daily      # Gout  - Continue PTA febuxostat 40 mg daily.  The following problems were addressed during her hospitalization:    # Discharge Pain Plan:   - During her hospitalization, Mariel experienced pain due to chronic back pain.  The pain plan for discharge was discussed with Mariel and the plan was created in a collaborative fashion.    - tylenol PRN    Consultations This Hospital Stay   VASCULAR ACCESS CARE ADULT IP CONSULT  PHYSICAL THERAPY ADULT IP CONSULT  OCCUPATIONAL THERAPY ADULT IP CONSULT     Code Status   Full Code       The patient was discussed with Dr. Ama[facility]  [facility]   Pager: [phone]  ______________________________________________________________________  Review of Systems   Constitutional: Positive for activity change. Negative for fever.   Respiratory: Negative for cough and shortness of breath.    Cardiovascular: Negative for chest pain and leg swelling.   Gastrointestinal: Negative for abdominal pain and nausea.   Genitourinary: Negative for difficulty urinating and dysuria.   Musculoskeletal: Positive for back pain and gait problem.   Neurological: Negative for headaches.   Psychiatric/Behavioral: Negative for confusion.     Physical Exam   Vital Signs: Temp: 97.6  F (36.4  C) Temp src: Axillary BP: 127/54 Pulse: 78   Resp: 16 SpO2: 95 % O2 Device: None (Room air) Oxygen Delivery: 1 LPM  Weight: 292 lbs 0 oz    Physical Exam  Constitutional: She is oriented to person, place, and time. No distress.   obese   HENT:   Mouth/Throat: Oropharynx is clear and moist.    Eyes: Conjunctivae are normal. Right eye exhibits no discharge. Left eye exhibits no discharge.   Cardiovascular: Normal rate and regular rhythm.    No murmur heard.  Pulmonary/Chest: Effort normal. No respiratory distress. She has no wheezes.   Abdominal: Soft. She exhibits no distension.   Musculoskeletal: She exhibits edema.   Neurological: She is alert and oriented to person, place, and time.   Skin: No rash noted. She is not diaphoretic.   Psychiatric: Her affect is blunt. Her speech is delayed. She is slowed. Cognition and memory are normal.   Nursing note and vitals reviewed.    Significant Results and Procedures   Most Recent 3 CBC's:  Recent Labs   Lab Test  09/10/18   0614 09/09/18   1820  08/23/18   1317   WBC  8.0  7.9  7.4   HGB  13.2  12.4  11.1*   MCV  85  86  86   PLT  133*  125*  140*     Most Recent 3 BMP's:  Recent Labs   Lab Test  09/10/18   0614 09/09/18   1820  09/06/18   1321   NA  143  141  139   POTASSIUM  3.9  4.4  3.6   CHLORIDE  105  103  101   CO2  31  31  33*   BUN  30  36*  34*   CR  1.48*  1.62*  1.59*   ANIONGAP  7  7  5   ANTOINETTE  9.9  9.3  10.0   GLC  107*  65*  104*     Most Recent ESR & CRP:  Recent Labs   Lab Test  09/10/18   0614   SED  10   CRP  6.1   ,   Results for orders placed or performed during the hospital encounter of 09/09/18   Lumbar spine MRI w/o contrast    Narrative    MR LUMBAR SPINE W/O CONTRAST, MR THORACIC SPINE W/O CONTRAST, MR  CERVICAL SPINE W/O CONTRAST 9/10/2018 2:00 AM    History: fall, low back pain, incontinence and saddle anesthesia.    Comparison: CT chest 6/26/2018    Technique:  Cervical spine: Sagittal T1-weighted, sagittal T2-weighted, sagittal  STIR, sagittal diffusion-weighted, axial T1-weighted, and axial  T2-weighted images of the cervical spine were obtained without the  administration of intravenous contrast.  Thoracic spine: Sagittal T1-weighted, sagittal T2-weighted, sagittal  STIR, sagittal diffusion weighted, axial T1-weighted, and  axial  T2-weighted images of the thoracic spine were obtained without the  administration of intravenous contrast.  Lumbar spine: Sagittal T1-weighted, sagittal T2-weighted, sagittal  STIR, axial T1-weighted, and axial T2-weighted images of the lumbar  spine were obtained without the administration of intravenous  contrast.    Findings:   CERVICAL:  Mild straightening of the cervical lordosis. Trace  retrolisthesis C4-5, likely degenerative. No evidence of traumatic  subluxation. Bone marrow signal intensity appears normal. No osseous  edema on STIR images. No abnormal or myelopathic signal in the  cervical spinal cord. On a level by level basis:    C2-3: No spinal canal or neural foraminal stenosis.    C3-4: Disc-osteophyte complex with trace narrowing of the spinal  canal. Uncinate and facet hypertrophy with mild left neural foraminal  stenosis.    C4-5: Disc-osteophyte complex with mild spinal canal stenosis.  Uncinate and facet hypertrophy with mild left neural foraminal  stenosis.    C5-6: Superiorly migrated disc extrusion. Moderate spinal canal  stenosis. Uncinate and facet hypertrophy with mild left neural  foraminal stenosis.    C6-7: Disc-osteophyte complex with mild spinal canal stenosis.  Uncinate and facet hypertrophy with mild left neural foraminal  stenosis.    C7-T1: No spinal canal or neural foraminal stenosis.    THORACIC: The external marker is at the level of T7. The thoracic  vertebrae are in normal alignment.  Bone marrow signal intensity  appears normal. There is no abnormal signal within the thoracic spinal  cord. Mild multilevel degenerative changes. No significant spinal  canal stenosis. No severe neural foraminal stenosis.    LUMBAR: There are five lumbar-type vertebrae identified. The tip of  the conus medullaris is at L1. The lumbar vertebral column are  normally aligned.  Bone marrow signal intensity appears normal. There  is multilevel disc height narrowing greatest at L2-L3. On a  level by  level basis:    L1-2: No spinal canal stenosis. Facet hypertrophy with mild right  bilateral neural foraminal stenosis, greater on the left.    L2-3: Disc osteophyte complex and ligamentum flavum hypertrophy  resulting in moderate-severe spinal canal stenosis. Moderate bilateral  neural foraminal stenosis.    L3-4: Disc osteophyte complex with ligamentum flavum thickening  resulting in mild-moderate spinal canal stenosis. Mild to moderate  left and mild right neural foraminal stenosis.    L4-5: Disc osteophyte complex with ligamentum flavum thickening  resulting in moderate spinal canal stenosis. Mild right and moderate  left neural foraminal stenosis.     L5-S1: No spinal canal or neural foraminal stenosis.    Normal paraspinous soft tissues.      Impression    Impression:  1. No evidence of traumatic spinal injury. Normal cervicothoracic  spinal cord and cauda equina nerve roots.  2. Multilevel spondylosis.  3. Moderate to severe spinal canal stenosis at L2-3 and moderate  spinal canal stenosis at C5-6 and L4-5.  4. Moderate bilateral neural foraminal stenosis at L2-3 and on the  left at L4-5.  5. Widely patent thoracic spinal canal and neural foramina.    I have personally reviewed the examination and initial interpretation  and I agree with the findings.    LINSEY MACDONALD MD   Cervical spine MRI w/o contrast    Narrative    MR LUMBAR SPINE W/O CONTRAST, MR THORACIC SPINE W/O CONTRAST, MR  CERVICAL SPINE W/O CONTRAST 9/10/2018 2:00 AM    History: fall, low back pain, incontinence and saddle anesthesia.    Comparison: CT chest 6/26/2018    Technique:  Cervical spine: Sagittal T1-weighted, sagittal T2-weighted, sagittal  STIR, sagittal diffusion-weighted, axial T1-weighted, and axial  T2-weighted images of the cervical spine were obtained without the  administration of intravenous contrast.  Thoracic spine: Sagittal T1-weighted, sagittal T2-weighted, sagittal  STIR, sagittal diffusion weighted, axial  T1-weighted, and axial  T2-weighted images of the thoracic spine were obtained without the  administration of intravenous contrast.  Lumbar spine: Sagittal T1-weighted, sagittal T2-weighted, sagittal  STIR, axial T1-weighted, and axial T2-weighted images of the lumbar  spine were obtained without the administration of intravenous  contrast.    Findings:   CERVICAL:  Mild straightening of the cervical lordosis. Trace  retrolisthesis C4-5, likely degenerative. No evidence of traumatic  subluxation. Bone marrow signal intensity appears normal. No osseous  edema on STIR images. No abnormal or myelopathic signal in the  cervical spinal cord. On a level by level basis:    C2-3: No spinal canal or neural foraminal stenosis.    C3-4: Disc-osteophyte complex with trace narrowing of the spinal  canal. Uncinate and facet hypertrophy with mild left neural foraminal  stenosis.    C4-5: Disc-osteophyte complex with mild spinal canal stenosis.  Uncinate and facet hypertrophy with mild left neural foraminal  stenosis.    C5-6: Superiorly migrated disc extrusion. Moderate spinal canal  stenosis. Uncinate and facet hypertrophy with mild left neural  foraminal stenosis.    C6-7: Disc-osteophyte complex with mild spinal canal stenosis.  Uncinate and facet hypertrophy with mild left neural foraminal  stenosis.    C7-T1: No spinal canal or neural foraminal stenosis.    THORACIC: The external marker is at the level of T7. The thoracic  vertebrae are in normal alignment.  Bone marrow signal intensity  appears normal. There is no abnormal signal within the thoracic spinal  cord. Mild multilevel degenerative changes. No significant spinal  canal stenosis. No severe neural foraminal stenosis.    LUMBAR: There are five lumbar-type vertebrae identified. The tip of  the conus medullaris is at L1. The lumbar vertebral column are  normally aligned.  Bone marrow signal intensity appears normal. There  is multilevel disc height narrowing greatest at  L2-L3. On a level by  level basis:    L1-2: No spinal canal stenosis. Facet hypertrophy with mild right  bilateral neural foraminal stenosis, greater on the left.    L2-3: Disc osteophyte complex and ligamentum flavum hypertrophy  resulting in moderate-severe spinal canal stenosis. Moderate bilateral  neural foraminal stenosis.    L3-4: Disc osteophyte complex with ligamentum flavum thickening  resulting in mild-moderate spinal canal stenosis. Mild to moderate  left and mild right neural foraminal stenosis.    L4-5: Disc osteophyte complex with ligamentum flavum thickening  resulting in moderate spinal canal stenosis. Mild right and moderate  left neural foraminal stenosis.     L5-S1: No spinal canal or neural foraminal stenosis.    Normal paraspinous soft tissues.      Impression    Impression:  1. No evidence of traumatic spinal injury. Normal cervicothoracic  spinal cord and cauda equina nerve roots.  2. Multilevel spondylosis.  3. Moderate to severe spinal canal stenosis at L2-3 and moderate  spinal canal stenosis at C5-6 and L4-5.  4. Moderate bilateral neural foraminal stenosis at L2-3 and on the  left at L4-5.  5. Widely patent thoracic spinal canal and neural foramina.    I have personally reviewed the examination and initial interpretation  and I agree with the findings.    LINSEY MACDONALD MD   MR Thoracic Spine w/o Contrast    Narrative    MR LUMBAR SPINE W/O CONTRAST, MR THORACIC SPINE W/O CONTRAST, MR  CERVICAL SPINE W/O CONTRAST 9/10/2018 2:00 AM    History: fall, low back pain, incontinence and saddle anesthesia.    Comparison: CT chest 6/26/2018    Technique:  Cervical spine: Sagittal T1-weighted, sagittal T2-weighted, sagittal  STIR, sagittal diffusion-weighted, axial T1-weighted, and axial  T2-weighted images of the cervical spine were obtained without the  administration of intravenous contrast.  Thoracic spine: Sagittal T1-weighted, sagittal T2-weighted, sagittal  STIR, sagittal diffusion  weighted, axial T1-weighted, and axial  T2-weighted images of the thoracic spine were obtained without the  administration of intravenous contrast.  Lumbar spine: Sagittal T1-weighted, sagittal T2-weighted, sagittal  STIR, axial T1-weighted, and axial T2-weighted images of the lumbar  spine were obtained without the administration of intravenous  contrast.    Findings:   CERVICAL:  Mild straightening of the cervical lordosis. Trace  retrolisthesis C4-5, likely degenerative. No evidence of traumatic  subluxation. Bone marrow signal intensity appears normal. No osseous  edema on STIR images. No abnormal or myelopathic signal in the  cervical spinal cord. On a level by level basis:    C2-3: No spinal canal or neural foraminal stenosis.    C3-4: Disc-osteophyte complex with trace narrowing of the spinal  canal. Uncinate and facet hypertrophy with mild left neural foraminal  stenosis.    C4-5: Disc-osteophyte complex with mild spinal canal stenosis.  Uncinate and facet hypertrophy with mild left neural foraminal  stenosis.    C5-6: Superiorly migrated disc extrusion. Moderate spinal canal  stenosis. Uncinate and facet hypertrophy with mild left neural  foraminal stenosis.    C6-7: Disc-osteophyte complex with mild spinal canal stenosis.  Uncinate and facet hypertrophy with mild left neural foraminal  stenosis.    C7-T1: No spinal canal or neural foraminal stenosis.    THORACIC: The external marker is at the level of T7. The thoracic  vertebrae are in normal alignment.  Bone marrow signal intensity  appears normal. There is no abnormal signal within the thoracic spinal  cord. Mild multilevel degenerative changes. No significant spinal  canal stenosis. No severe neural foraminal stenosis.    LUMBAR: There are five lumbar-type vertebrae identified. The tip of  the conus medullaris is at L1. The lumbar vertebral column are  normally aligned.  Bone marrow signal intensity appears normal. There  is multilevel disc height  narrowing greatest at L2-L3. On a level by  level basis:    L1-2: No spinal canal stenosis. Facet hypertrophy with mild right  bilateral neural foraminal stenosis, greater on the left.    L2-3: Disc osteophyte complex and ligamentum flavum hypertrophy  resulting in moderate-severe spinal canal stenosis. Moderate bilateral  neural foraminal stenosis.    L3-4: Disc osteophyte complex with ligamentum flavum thickening  resulting in mild-moderate spinal canal stenosis. Mild to moderate  left and mild right neural foraminal stenosis.    L4-5: Disc osteophyte complex with ligamentum flavum thickening  resulting in moderate spinal canal stenosis. Mild right and moderate  left neural foraminal stenosis.     L5-S1: No spinal canal or neural foraminal stenosis.    Normal paraspinous soft tissues.      Impression    Impression:  1. No evidence of traumatic spinal injury. Normal cervicothoracic  spinal cord and cauda equina nerve roots.  2. Multilevel spondylosis.  3. Moderate to severe spinal canal stenosis at L2-3 and moderate  spinal canal stenosis at C5-6 and L4-5.  4. Moderate bilateral neural foraminal stenosis at L2-3 and on the  left at L4-5.  5. Widely patent thoracic spinal canal and neural foramina.    I have personally reviewed the examination and initial interpretation  and I agree with the findings.    LINSEY MACDONALD MD     *Note: Due to a large number of results and/or encounters for the requested time period, some results have not been displayed. A complete set of results can be found in Results Review.       Pending Results  Dr. Byrd  Unresulted Labs Ordered in the Past 30 Days of this Admission     Date and Time Order Name Status Description    9/9/2018 1630 Urine Culture Preliminary              Primary Care Physician   Rafaela William    Discharge Disposition   Discharged to home  Condition at discharge: Stable    Discharge Orders     Home care nursing referral     Reason for your hospital stay    You were admitted for evaluation after a fall.     Follow Up and recommended labs and tests   Follow up with primary care provider, Rafaela William, within 7 days for hospital follow- up.  Recommending discussing further evaluation and work by physical therapy.     Activity   Your activity upon discharge: activity as tolerated     Full Code     Diet   Follow this diet upon discharge: Regular diet       Discharge Medications   Current Discharge Medication List      CONTINUE these medications which have NOT CHANGED    Details   acetaminophen (TYLENOL) 325 MG tablet Take 2 tablets (650 mg) by mouth every 4 hours as needed for mild pain  Qty: 100 tablet    Associated Diagnoses: S/P CABG x 3; Acute post-operative pain      albuterol (PROAIR HFA) 108 (90 Base) MCG/ACT inhaler Inhale 1-2 puffs into the lungs every 4 hours as needed for wheezing  Qty: 1 Inhaler, Refills: PRN    Comments: proair  Associated Diagnoses: Chronic obstructive pulmonary disease, unspecified COPD type (H)      aspirin  MG tablet Take 1 tablet by mouth daily.  Qty: 30 tablet, Refills: 0      atorvastatin (LIPITOR) 40 MG tablet Take 1 tablet (40 mg) by mouth daily  Qty: 30 tablet, Refills: 0    Associated Diagnoses: Atherosclerosis of native coronary artery without angina pectoris, unspecified whether native or transplanted heart      bumetanide (BUMEX) 2 MG tablet Take 1 tablet (2 mg) by mouth 2 times daily  Qty: 60 tablet, Refills: 1    Associated Diagnoses: Chronic diastolic heart failure (H)      clotrimazole (LOTRIMIN) 1 % cream Apply topically 2 times daily  Qty: 60 g, Refills: 3    Associated Diagnoses: Dermatitis      cyclobenzaprine (FLEXERIL) 10 MG tablet Take 10 mg by mouth daily as needed for muscle spasms Does not take tizanidine or trazodone if using cyclobenzaprine      escitalopram (LEXAPRO) 20 MG tablet Take 1 tablet (20 mg) by mouth every morning  Qty: 30 tablet, Refills: 0    Associated Diagnoses: Recurrent major  depressive disorder, in partial remission (H)      febuxostat (ULORIC) 40 MG TABS tablet Take 40 mg by mouth every evening      fluconazole (DIFLUCAN) 150 MG tablet Take 1 tablet (150 mg) by mouth every 3 days  Qty: 4 tablet, Refills: 0    Associated Diagnoses: Candidiasis of vulva and vagina      FOLIC ACID PO Take 1 mg by mouth daily       guaiFENesin (MUCINEX) 600 MG 12 hr tablet Take 1 tablet (600 mg) by mouth 2 times daily      insulin isophane human (HUMULIN N PEN) 100 UNIT/ML injection Inject 35 Units Subcutaneous 2 times daily      ketamine, KETALAR, 5%-gabapentin, NEURONTIN, 8% 5%-8% GEL topical PLO cream Apply 30 g topically 3 times daily  Qty: 30 g, Refills: 0    Associated Diagnoses: Mononeuritis      levothyroxine (SYNTHROID/LEVOTHROID) 150 MCG tablet Take 1 tablet (150 mcg) by mouth daily  Qty: 30 tablet, Refills: 0    Comments: Synthroid only  Associated Diagnoses: Other specified hypothyroidism      losartan (COZAAR) 25 MG tablet Take 1 tablet (25 mg) by mouth daily  Qty: 30 tablet, Refills: 3    Associated Diagnoses: Chronic diastolic heart failure (H)      Metoprolol Tartrate 75 MG TABS Take 75 mg by mouth 2 times daily  Qty: 180 tablet, Refills: 3    Associated Diagnoses: Atherosclerosis of native coronary artery without angina pectoris, unspecified whether native or transplanted heart      miconazole (MICATIN; MICRO GUARD) 2 % powder Apply topically 2 times daily  Qty: 71 g, Refills: 1    Associated Diagnoses: Dermatitis      niacin (NIASPAN) 500 MG CR tablet Take 500 mg by mouth daily   Refills: 9      potassium chloride SA (K-DUR/KLOR-CON M) 10 MEQ CR tablet Take 4 tablets (40 mEq) by mouth 3 times daily  Qty: 360 tablet, Refills: 3    Associated Diagnoses: Chronic diastolic heart failure (H)      pregabalin (LYRICA) 100 MG capsule Take 1 capsule (100 mg) by mouth 2 times daily  Qty: 90 capsule, Refills: 0    Associated Diagnoses: Pain in joint of left shoulder      repaglinide (PRANDIN) 1 MG  tablet Take 1 tablet (1 mg) by mouth 3 times daily (before meals)  Qty: 90 tablet, Refills: 3    Associated Diagnoses: Type 2 diabetes mellitus with diabetic chronic kidney disease, unspecified CKD stage, unspecified long term insulin use status (H)      senna-docusate (SENOKOT-S;PERICOLACE) 8.6-50 MG per tablet Take 1-2 tablets by mouth 2 times daily as needed for constipation  Qty: 30 tablet, Refills: 0    Associated Diagnoses: Atherosclerosis of native coronary artery without angina pectoris, unspecified whether native or transplanted heart      TIZANIDINE HCL PO Take 4 mg by mouth At Bedtime       traZODone (DESYREL) 50 MG tablet Take 3 tablets (150 mg) by mouth At Bedtime  Qty: 270 tablet, Refills: 1    Associated Diagnoses: Recurrent major depressive disorder, in partial remission (H)      VITAMIN D, CHOLECALCIFEROL, PO Take 10,000 Units by mouth daily      B-D U/F insulin pen needle USE FOR INSULIN INJECTION  Qty: 100 each, Refills: 0    Associated Diagnoses: Type 2 diabetes mellitus with diabetic polyneuropathy, without long-term current use of insulin (H)      blood glucose monitoring (NO BRAND SPECIFIED) test strip 1 strip by In Vitro route 2 times daily Strips per patient's preference  Qty: 200 each, Refills: 3    Associated Diagnoses: Type 2 diabetes mellitus with diabetic polyneuropathy, without long-term current use of insulin (H)      EPINEPHrine (EPIPEN/ADRENACLICK/OR ANY BX GENERIC EQUIV) 0.3 MG/0.3ML injection 2-pack Inject 0.3 mLs (0.3 mg) into the muscle once as needed for anaphylaxis  Qty: 0.6 mL, Refills: 0    Associated Diagnoses: Allergic reaction, subsequent encounter      ferrous gluconate (FERGON) 324 (38 Fe) MG tablet Take 1 tablet (324 mg) by mouth Every Mon, Wed, Fri Morning  Qty: 12 tablet, Refills: 2    Associated Diagnoses: Chronic diastolic heart failure (H); Iron deficiency anemia secondary to inadequate dietary iron intake      insulin syringes, disposable, U-100 1 ML MISC 1 each  5 times daily  Qty: 100 each, Refills: 11    Associated Diagnoses: Type 2 diabetes mellitus with chronic kidney disease, without long-term current use of insulin, unspecified CKD stage (H)      LIFESCAN FINEPOINT LANCETS MISC Use to test blood sugars 2 times daily or as directed.  Qty: 100 each, Refills: prn    Associated Diagnoses: Type 2 diabetes mellitus with diabetic polyneuropathy, without long-term current use of insulin (H)      nitroGLYcerin (NITROSTAT) 0.4 MG sublingual tablet For chest pain place 1 tablet under the tongue every 5 minutes for 3 doses. If symptoms persist 5 minutes after 1st dose call 911.  Qty: 25 tablet, Refills: 0    Associated Diagnoses: Atherosclerosis of native coronary artery without angina pectoris, unspecified whether native or transplanted heart      ONE TOUCH ULTRA (DEVICES) MISC test blood sugar BID  Qty: 1, Refills: 0    Associated Diagnoses: Type II or unspecified type diabetes mellitus without mention of complication, not stated as uncontrolled      polyethylene glycol (MIRALAX/GLYCOLAX) Packet Take 17 g by mouth daily  Qty: 7 packet, Refills: 0    Associated Diagnoses: Atherosclerosis of native coronary artery without angina pectoris, unspecified whether native or transplanted heart           Allergies   Allergies   Allergen Reactions     Contrast Dye Anaphylaxis     Fish Allergy Anaphylaxis     Iodine Anaphylaxis     Oxycodone Other (See Comments)     Severe suicidal tendencies on this medication     Tree Nuts [Nuts] Anaphylaxis     Tree nuts only (peanuts ok)     Albuterol Other (See Comments)     Sandrita-oral erythema  Confirmed 7/3/18 that patient uses albuterol inhalers at home. Patient had her home inhaler in her bag.     Bactrim      Increased uric acid     Betadine [Povidone Iodine] Hives, Swelling and Difficulty breathing     Betadine     Combivent      Rash     Dulaglutide Other (See Comments)     Hx . Of thyroid cancer.     Lisinopril Other (See Comments)     Scr/grf  severely reduced.      Penicillins Rash     Allopurinol Rash     Latex Rash     Wool Fiber Rash

## 2018-09-10 NOTE — PROGRESS NOTES
09/10/18 1103   Quick Adds   Type of Visit Initial PT Evaluation   Living Environment   Lives With friend(s)   Living Arrangements house   Home Accessibility stairs to enter home   Number of Stairs to Enter Home 2   Number of Stairs Within Home 0   Stair Railings at Home outside, present at both sides   Transportation Available family or friend will provide   Living Environment Comment Pt lives with her friend; friend works outside the home, so pt is alone daily    Self-Care   Dominant Hand right   Usual Activity Tolerance good   Current Activity Tolerance fair   Regular Exercise no   Equipment Currently Used at Home cane, quad;grab bar;shower chair;walker, rolling;walker, standard;other (see comments)  (adjustable bed ([facility]))   Activity/Exercise/Self-Care Comment Was receiving home care services prior to admission    Functional Level Prior   Ambulation 1-->assistive equipment   Transferring 1-->assistive equipment   Toileting 1-->assistive equipment   Bathing 3-->assistive equipment and person   Dressing 2-->assistive person   Eating 0-->independent   Communication 0-->understands/communicates without difficulty   Swallowing 2-->difficulty swallowing liquids/foods   Cognition 0 - no cognition issues reported   Fall history within last six months yes   Number of times patient has fallen within last six months 6   Which of the above functional risks had a recent onset or change? ambulation;transferring;toileting;swallowing;fall history   Prior Functional Level Comment Pt reports that her falls are related to L/E spasms that result in her knees buckling; she is unable to get up without assistance (have called neighbor & EMS)   General Information   Onset of Illness/Injury or Date of Surgery - Date 09/09/18   Referring Physician Abdi Olivarez MD   Patient/Family Goals Statement Get stronger to return home   Pertinent History of Current Problem (include personal factors and/or comorbidities that impact the  POC) Pt presented to ED on 9/9/18 with bilateral hand tremors, L/E weakness & LBP following fall (has had 6 falls over past week). PMHx: hypothyroidism; HTN; type II diabetes; CAD s/p PCI x 2 & recent CAGB (7/2/18) with prolonged hospitalization & TCU stay. 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   Precautions/Limitations fall precautions;sternal precautions   General Observations Pt seated in recliner upon PTs arrival; agreeable to PT session   Cognitive Status Examination   Orientation orientation to person, place and time   Level of Consciousness alert   Follows Commands and Answers Questions 100% of the time   Personal Safety and Judgment intact   Pain Assessment   Patient Currently in Pain Yes, see Vital Sign flowsheet   Integumentary/Edema   Integumentary/Edema Comments bilateral L/E edema; wraps in place   Range of Motion (ROM)   ROM Comment bilateral hamstring & gastroc tighness   Strength   Strength Comments generalized weakness noted; MMT deferred due to pt's c/o LBP   Transfer Skills   Transfer Comments sit to/from stand from EOB, armchair, toilet with use of rail & WW; SBA from PT   Gait   Gait Comments CGA to amb with WW on level 20 ft x 2; exhibits decreased gait stefania, decreased step length bilaterally, decreased heelstrike bilaterally    General Therapy Interventions   Planned Therapy Interventions bed mobility training;gait training;strengthening;transfer training   Clinical Impression   Criteria for Skilled Therapeutic Intervention yes, treatment indicated   PT Diagnosis impaired mobility    Influenced by the following impairments pain, generalized weakness, deconditioning   Functional limitations due to impairments decreased independence with bed mobility, transfers & gait with frequent falls at home   Clinical Presentation Stable/Uncomplicated   Clinical Presentation Rationale decreased flexibility & strength bilateral L/Es with decreased indepedence with bed mobility,  "transfers & gait; LBP; falls   Clinical Decision Making (Complexity) Low complexity   Therapy Frequency` daily   Predicted Duration of Therapy Intervention (days/wks) 1 week   Anticipated Discharge Disposition Transitional Care Facility   Risk & Benefits of therapy have been explained Yes   Patient, Family & other staff in agreement with plan of care Yes   [facility] TM \"6 Clicks\"   2016, Trustees of [facility], under license to [employer].  All rights reserved.   6 Clicks Short Forms Basic Mobility Inpatient Short Form   [facility]  \"6 Clicks\" V.2 Basic Mobility Inpatient Short Form   1. Turning from your back to your side while in a flat bed without using bedrails? 3 - A Little   2. Moving from lying on your back to sitting on the side of a flat bed without using bedrails? 3 - A Little   3. Moving to and from a bed to a chair (including a wheelchair)? 3 - A Little   4. Standing up from a chair using your arms (e.g., wheelchair, or bedside chair)? 3 - A Little   5. To walk in hospital room? 3 - A Little   6. Climbing 3-5 steps with a railing? 2 - A Lot   Basic Mobility Raw Score (Score out of 24.Lower scores equate to lower levels of function) 17   Total Evaluation Time   Total Evaluation Time (Minutes) 10     "
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
## 2018-09-10 NOTE — PLAN OF CARE
Problem: Patient Care Overview  Goal: Plan of Care/Patient Progress Review  PT-7A: eval completed & treatment inititated  Discharge Planner PT   Patient plan for discharge: open to TCU  Current status: SBA for sit to/from stand; CGA to amb on level with WW 20 ft x 2  Barriers to return to prior living situation: deconditioned; assist for gait; recent falls; steps to enter; home alone during the day  Recommendations for discharge: TCU  Rationale for recommendations: will benefit from continued skilled PT intervention to address mobility deficits, improve strength & improve activity tolerance       Entered by: Sammi Martinez 09/10/2018 12:54 PM

## 2018-09-10 NOTE — PLAN OF CARE
Pt left unit around 1715. Before leaving the unit, pt was afebrile. OVSS on RA. Pt complaining of some lower back pain, PRN Tylenol given x1. Pt on low fat diet, no complaints of nausea this shift. PIV L Forearm removed. All paperwork discussed with the patient and her roommate, all questions answered. All belongings sent with the patient. Pt left via wheelchair and will be returning home. Roommate providing transportation back home.

## 2018-09-10 NOTE — TELEPHONE ENCOUNTER
At this time pt is still in the hospital and discharge summary is not available, medication is still listed as inpatient medication. It sounds like patient is being discharged today. Will have to check on status tomorrow, Tuesday 9/11.    Elissa Magana, SONIAN, RN  [facility]

## 2018-09-10 NOTE — PROGRESS NOTES
Social Work Services Progress Note    Hospital Day: 2  Collaborated with:  Pt, care team,  rehab liaison     Data:  Received update from MD around 2p that pt is ready for discharge. Pt was switched from inpatient to observation status this afternoon, as well. Therapies recommending TCU, but based on pt's insurance, she would likely not have coverage for TCU, as she does not have an 3-midnight inpatient hospitalization this stay or in the last 30 days. Per chart review, pt was at  ARU in July and was then set up with  home care.     Met with pt at the bedside; introduced self and role of SW. Explained the information above and provided pt with Observation Status letter. Inquired about pt's preference for discharge plan. Pt reported that she was hopeful to return to rehab before going home. Noted option for private pay should she not have coverage but pt stated she could not afford this. Agreed to investigate further with therapies as to whether or not pt would be appropriate for ARU, as pt would likely have coverage for this and would like to get stronger before returning home. Pt is going to talk with friends/family to see what additional support she could get if she is to return. Also discussed making referral to the Senior Linkage Line.     Paged PT therapist who saw pt earlier today to see if ARU would be appropriate. Received page back from PT stating that OT will assess pt further tmrw and will update recommendations if needed. Also spoke with Maria Del Rosario,  rehab liaison, to assess for possible ARU admission if appropriate.     Updated MD and met with pt to follow up. Pt asked that SW speak with her roommate, Odalis, and explain situation. Spoke with Odalis by phone in room with pt and explained the above. Based on discussion, pt may not want to stay tonight to have further OT assessment and would like to return home with home care, as she is unable to pay privately for TCU. Pt and Odalis to discuss further and  will let RN know what they decide. Noted that SW will update MD and ask RN CC to put in resumption of home care orders. Pt agreeable to this plan.     Spoke with MD again to update and requested RN CC put in home care orders in case pt decides to dc home. Shortly after received call back from  TCU/ARU liaison, Maria Del Rosario, stating that pt would not be appropriate for ARU.     Intervention:  Discharge planning     Assessment:  Pt soft-spoken with flat affect but receptive to SW visit. Odalis was pleasant and receptive to SW, seemed to be knowlegeable re: observation status and rehab needs.    Plan:    Anticipated Disposition:  Home with resumption of home care     Barriers to d/c plan:  None, pt likely to dc home tonight.    Follow Up:  SW will follow up tmrw if pt is still hospitalized and will make referral to Senior [facility] to follow up with pt in community.     Marry Yu, MSW, [facility]  - Coverage for 7A Renetta Karen  Ph. [phone]  [email or web address]     NO LETTER

## 2018-09-10 NOTE — PROGRESS NOTES
[facility]  Patient is currently open to home care services with [facility].  The patient is currently receiving RN/PT/OT/SLP services.  [facility]  and team have been notified of patient admission.  SLP due to dysphagia.  [facility] liaison will continue to follow patient during stay.  If appropriate provide orders to resume home care at time of discharge.    Thank you  Fabiola Blas RN, BSN  [facility] Liaison  [facility]  [phone]

## 2018-09-10 NOTE — PROGRESS NOTES
Care Coordinator  D/I: Per Fabiola Blas, [facility]ong, pt is open to RN,PT,OT,HHA and SLP therapy at home. PT/OT evaluating pt for TCU vs ARU stay--however Shanell from  called me and said pt was Inpatient status and has been changed to Observation status and needs the Obs letter, 7A sw, Marry Yu, was going to talk to the pt so she gave her the letter @ [phone] and explained it to her.  A: PT says TCU  P: Pt had a [facility] ARU stay in July and was discharged home. Per Marry, OT will assess pt tomorrow to determine if she is safe to discharge to home with resumption of above services or if she will meet ARU criteria. Per Marry, HH services come to her almost daily. Pt would like to go to TCU or ARU. Will follow.

## 2018-09-10 NOTE — UTILIZATION REVIEW
"Admission Status; Secondary Review Determination     Under the authority of the Utilization Management Committee, the utilization review process indicated a secondary review on the above patient.  The review outcome is based on review of the medical records, discussions with staff, and applying clinical experience noted on the date of the review.       (x) Observation Status Appropriate - This patient does not meet hospital inpatient criteria and is placed in observation status. If this patient's primary payer is Medicare and was admitted as an inpatient, Condition Code 44 should be used and patient status changed to \"observation\".     RATIONALE FOR DETERMINATION: 73-year-old female with multiple medical problems including fibromyalgia and diabetes presents to the emergency room after several falls this past week developing lower extremity weakness and difficulty with mobility.  Observation care appropriate to evaluate potential causes that might need acute intervention as well as evaluation for safe placement.      The severity of illness, intensity of service provided, expected LOS and risk for adverse outcome make the care appropriate for further observation; however, doesn't meet criteria for hospital inpatient admission. This was discussed with attending physician who concurred with this determination.    The information on this document is developed by the utilization review team in order for the business office to ensure compliance.  This only denotes the appropriateness of proper admission status and does not reflect the quality of care rendered.         The definitions of Inpatient Status and Observation Status used in making the determination above are those provided in the CMS Coverage Manual, Chapter 1 and Chapter 6, section 70.4.      Sincerely,     Ori Hayes MD    Physician Advisor  Utilization Review/ Case Management  [facility].    "

## 2018-09-10 NOTE — PLAN OF CARE
"Problem: Patient Care Overview  Goal: Plan of Care/Patient Progress Review  Outcome: No Change  Arrived to 7A from [facility] at 0445  /71 (BP Location: Right arm)  Pulse 75  Temp 97.8  F (36.6  C) (Oral)  Resp 18  Ht 1.676 m (5' 6\")  Wt 132.5 kg (292 lb)  SpO2 97%  BMI 47.13 kg/m2  VSS on RA  B on arrival  PRN tylenol given for back pain, denied nausea - pt needs applesauce to swallow pills  Combo regular diet  Periph saline locked.  Pt did not have to void when arrived to unit - has had urine incontinence in past but not while on the unit. Reported to have BM yesterday.  Blanchable redness on coccyx. Otherwise skin scarred, meghann but intact.  Assist x 2 - pt states that she has intermittent numbness in legs that causes her to fall. Pt has cane from home in her room.  Continue to monitor.        "

## 2018-09-10 NOTE — PROGRESS NOTES
09/10/18 1103   Quick Adds   Type of Visit Initial PT Evaluation   Living Environment   Lives With friend(s)   Living Arrangements house   Home Accessibility stairs to enter home   Number of Stairs to Enter Home 2   Number of Stairs Within Home 0   Stair Railings at Home outside, present at both sides   Transportation Available family or friend will provide   Living Environment Comment Pt lives with her friend; friend works outside the home, so pt is alone daily    Self-Care   Dominant Hand right   Usual Activity Tolerance good   Current Activity Tolerance fair   Regular Exercise no   Equipment Currently Used at Home cane, quad;grab bar;shower chair;walker, rolling;walker, standard;other (see comments)  (adjustable bed (temperpedic))   Activity/Exercise/Self-Care Comment Was receiving home care services prior to admission    Functional Level Prior   Ambulation 1-->assistive equipment   Transferring 1-->assistive equipment   Toileting 1-->assistive equipment   Bathing 3-->assistive equipment and person   Dressing 2-->assistive person   Eating 0-->independent   Communication 0-->understands/communicates without difficulty   Swallowing 2-->difficulty swallowing liquids/foods   Cognition 0 - no cognition issues reported   Fall history within last six months yes   Number of times patient has fallen within last six months 6   Which of the above functional risks had a recent onset or change? ambulation;transferring;toileting;swallowing;fall history   Prior Functional Level Comment Pt reports that her falls are related to L/E spasms that result in her knees buckling; she us unable to get up without assistance (have called neighbor & EMS)   General Information   Onset of Illness/Injury or Date of Surgery - Date 09/09/18   Referring Physician Abdi Olivarez MD   Patient/Family Goals Statement Get stronger to return home   Pertinent History of Current Problem (include personal factors and/or comorbidities that impact the  POC) Pt presented to ED on 9/9/18 with bilateral hand tremors, L/E weakness & LBP following fall (has had 6 falls over past week). PMHx: hypothyroidism; HTN; type II diabetes; CAD s/p PCI x 2 & recent CAGB (7/2/18) with prolonged hospitalization & TCU stay. 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   Precautions/Limitations fall precautions;sternal precautions   General Observations Pt seated in recliner upon PTs arrival; agreeable to PT session   Cognitive Status Examination   Orientation orientation to person, place and time   Level of Consciousness alert   Follows Commands and Answers Questions 100% of the time   Personal Safety and Judgment intact   Pain Assessment   Patient Currently in Pain Yes, see Vital Sign flowsheet   Integumentary/Edema   Integumentary/Edema Comments bilateral L/E edema; wraps in place   Range of Motion (ROM)   ROM Comment bilateral hamstring & gastroc tighness   Strength   Strength Comments generalized weakness noted; MMT deferred due to pt's c/o LBP   Transfer Skills   Transfer Comments sit to/from stand from EOB, armchair, toilet with use of rail & WW; SBA from PT   Gait   Gait Comments CGA to amb with WW on level 20 ft x 2; exhibits decreased gait stefania, decreased step length bilaterally, decreased heelstrike bilaterally    General Therapy Interventions   Planned Therapy Interventions bed mobility training;gait training;strengthening;transfer training   Clinical Impression   Criteria for Skilled Therapeutic Intervention yes, treatment indicated   PT Diagnosis impaired mobility    Influenced by the following impairments pain, generalized weakness, deconditioning   Functional limitations due to impairments decreased independence with bed mobility, transfers & gait with frequent falls at home   Clinical Presentation Stable/Uncomplicated   Clinical Presentation Rationale decreased flexibility & strength bilateral L/Es with decreased indepedence with bed mobility,  "transfers & gait; LBP; falls   Clinical Decision Making (Complexity) Low complexity   Therapy Frequency` daily   Predicted Duration of Therapy Intervention (days/wks) 1 week   Anticipated Discharge Disposition Transitional Care Facility   Risk & Benefits of therapy have been explained Yes   Patient, Family & other staff in agreement with plan of care Yes   [facility] TM \"6 Clicks\"   2016, Trustees of [facility], under license to [employer].  All rights reserved.   6 Clicks Short Forms Basic Mobility Inpatient Short Form   [facility]  \"6 Clicks\" V.2 Basic Mobility Inpatient Short Form   1. Turning from your back to your side while in a flat bed without using bedrails? 3 - A Little   2. Moving from lying on your back to sitting on the side of a flat bed without using bedrails? 3 - A Little   3. Moving to and from a bed to a chair (including a wheelchair)? 3 - A Little   4. Standing up from a chair using your arms (e.g., wheelchair, or bedside chair)? 3 - A Little   5. To walk in hospital room? 3 - A Little   6. Climbing 3-5 steps with a railing? 2 - A Lot   Basic Mobility Raw Score (Score out of 24.Lower scores equate to lower levels of function) 17   Total Evaluation Time   Total Evaluation Time (Minutes) 10     "
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
## 2018-09-11 ENCOUNTER — DOCUMENTATION ONLY (OUTPATIENT)
Dept: CARE COORDINATION | Facility: CLINIC | Age: 73
End: 2018-09-11

## 2018-09-11 ENCOUNTER — PATIENT OUTREACH (OUTPATIENT)
Dept: CARE COORDINATION | Facility: CLINIC | Age: 73
End: 2018-09-11

## 2018-09-11 NOTE — PLAN OF CARE
Problem: Patient Care Overview  Goal: Plan of Care/Patient Progress Review  Physical Therapy Discharge Summary    Reason for therapy discharge:    Discharged to home.    Progress towards therapy goal(s). See goals on Care Plan in [facility] electronic health record for goal details.  Goals partially met.  Barriers to achieving goals:   discharge from facility.    Therapy recommendation(s):    Continued therapy is recommended.  Rationale/Recommendations:  To progress strength, endurance, and safety with independent functional mobility.

## 2018-09-12 ENCOUNTER — OFFICE VISIT (OUTPATIENT)
Dept: FAMILY MEDICINE | Facility: CLINIC | Age: 73
End: 2018-09-12
Payer: MEDICARE

## 2018-09-12 ENCOUNTER — DOCUMENTATION ONLY (OUTPATIENT)
Dept: CARE COORDINATION | Facility: CLINIC | Age: 73
End: 2018-09-12

## 2018-09-12 ENCOUNTER — PATIENT OUTREACH (OUTPATIENT)
Dept: CARE COORDINATION | Facility: CLINIC | Age: 73
End: 2018-09-12

## 2018-09-12 ENCOUNTER — TELEPHONE (OUTPATIENT)
Dept: FAMILY MEDICINE | Facility: CLINIC | Age: 73
End: 2018-09-12

## 2018-09-12 VITALS
SYSTOLIC BLOOD PRESSURE: 121 MMHG | BODY MASS INDEX: 46.48 KG/M2 | HEART RATE: 64 BPM | TEMPERATURE: 97.4 F | OXYGEN SATURATION: 95 % | WEIGHT: 288 LBS | DIASTOLIC BLOOD PRESSURE: 75 MMHG | RESPIRATION RATE: 18 BRPM

## 2018-09-12 DIAGNOSIS — N18.30 TYPE 2 DIABETES MELLITUS WITH STAGE 3 CHRONIC KIDNEY DISEASE, WITH LONG-TERM CURRENT USE OF INSULIN (H): Chronic | ICD-10-CM

## 2018-09-12 DIAGNOSIS — D69.6 THROMBOCYTOPENIA (H): ICD-10-CM

## 2018-09-12 DIAGNOSIS — D50.8 IRON DEFICIENCY ANEMIA SECONDARY TO INADEQUATE DIETARY IRON INTAKE: ICD-10-CM

## 2018-09-12 DIAGNOSIS — Z79.4 TYPE 2 DIABETES MELLITUS WITH STAGE 3 CHRONIC KIDNEY DISEASE, WITH LONG-TERM CURRENT USE OF INSULIN (H): Chronic | ICD-10-CM

## 2018-09-12 DIAGNOSIS — Z23 NEED FOR PROPHYLACTIC VACCINATION AND INOCULATION AGAINST INFLUENZA: ICD-10-CM

## 2018-09-12 DIAGNOSIS — E11.22 TYPE 2 DIABETES MELLITUS WITH STAGE 3 CHRONIC KIDNEY DISEASE, WITH LONG-TERM CURRENT USE OF INSULIN (H): Chronic | ICD-10-CM

## 2018-09-12 DIAGNOSIS — M54.50 LOWER BACK PAIN: Primary | ICD-10-CM

## 2018-09-12 DIAGNOSIS — I50.32 CHRONIC DIASTOLIC HEART FAILURE (H): ICD-10-CM

## 2018-09-12 DIAGNOSIS — E66.01 MORBID OBESITY DUE TO EXCESS CALORIES (H): ICD-10-CM

## 2018-09-12 DIAGNOSIS — M54.6 PAIN IN THORACIC SPINE: ICD-10-CM

## 2018-09-12 DIAGNOSIS — M48.061 SPINAL STENOSIS OF LUMBAR REGION, UNSPECIFIED WHETHER NEUROGENIC CLAUDICATION PRESENT: Primary | ICD-10-CM

## 2018-09-12 DIAGNOSIS — M48.02 CERVICAL STENOSIS OF SPINAL CANAL: ICD-10-CM

## 2018-09-12 DIAGNOSIS — M54.50 ACUTE MIDLINE LOW BACK PAIN WITHOUT SCIATICA: ICD-10-CM

## 2018-09-12 PROBLEM — I77.9 BILATERAL CAROTID ARTERY DISEASE (H): Status: ACTIVE | Noted: 2018-09-12

## 2018-09-12 PROCEDURE — 99495 TRANSJ CARE MGMT MOD F2F 14D: CPT | Performed by: FAMILY MEDICINE

## 2018-09-12 PROCEDURE — G0008 ADMIN INFLUENZA VIRUS VAC: HCPCS | Performed by: FAMILY MEDICINE

## 2018-09-12 PROCEDURE — 90662 IIV NO PRSV INCREASED AG IM: CPT | Performed by: FAMILY MEDICINE

## 2018-09-12 RX ORDER — FERROUS GLUCONATE 324(38)MG
324 TABLET ORAL
Qty: 36 TABLET | Refills: 3 | Status: ON HOLD | OUTPATIENT
Start: 2018-09-12 | End: 2019-05-02

## 2018-09-12 NOTE — PATIENT INSTRUCTIONS
Take tylenol 1000mg up to three times daily as needed for pain.     Take fluconazole (Diflucan) one tab today for yeast infection.  Ok to repeat the dose after three days if you still have vaginal symptoms.

## 2018-09-12 NOTE — TELEPHONE ENCOUNTER
Patient was in today to see Dr. William.  Can we refill this humulin insulin? Left message for home care about PT but did not hear back yet.   Will keep trying.   I will add an order for home care PT. done  Monica Gonzalez RN

## 2018-09-12 NOTE — PROGRESS NOTES
Made referral to [place] [facility] to follow up re: possible resources for which pt may be eligible in the community. Discussed this with pt during hospitalization. Referral confirmation #: [id number]    NAKITA Lewis, [facility]  - Coverage for 7A Renetta Jones  Ph. [phone]  [email or web address]     NO LETTER

## 2018-09-12 NOTE — PROGRESS NOTES
Clinic Care Coordination Contact    Clinic Care Coordination Contact  OUTREACH    Referral Information:  Referral Source: IP Report    Primary Diagnosis: Orthopedic    Chief Complaint   Patient presents with     Clinic Care Coordination - [facility]     clinic Care Coordination RN         [place] Utilization: [facility] admission 9/9-9/10/2018-  Discharge Diagnoses         Acute midline low back pain, with sciatica presence unspecified  Diabetic hypoglycemia (H)  Encounter for long-term (current) use of insulin (H)  Falls frequently  Generalized muscle weakness  Type 2 diabetes mellitus with stage 3 chronic kidney disease, with long-term current use of insulin (H)     Clinic Utilization  Difficulty keeping appointments:: No  Compliance Concerns: No  No PCP office visit in Past Year: No  Utilization    Last refreshed: 9/12/2018 10:49 AM:  No Show Count (past year) 3       Last refreshed: 9/12/2018 10:49 AM:  ED visits 1       Last refreshed: 9/12/2018 10:49 AM:  Hospital admissions 5          Current as of: 9/12/2018 10:49 AM                   Home Care Contact:              Home Care Agency: [facility]               Contact name () and phone number: [facility] RN  [phone]              Care Coordination contacted home care: Yes              Anticipated start of care date: Yesterday 9/    Patient Contact:               Introduced self and role of care coordination.               Discharge instructions were reviewed with patient/caregiver.               Do you have any questions about your medications? No reviewed a PCP visit today               Follow up appointment is scheduled for today .              Provided 24 Hour Nurse Line and/or 24 Hour Appointment Scheduling: Yes              Home care has contacted patient: Yes              Patient questions/concerns: Patient states the plan today at PCP visit was to take Tylenol 1000 mg 3 times a day .   Patient is using a cane and a rolling chair Patient reports lower blood sugars of 40-50  Insulin adjustment made today  Patients plan is to drink regular gingerale if blood sugar is below 70    Plan: RN/SW Care Coordinator will await notification from home care staff informing RN/SW Care Coordinator of patients discharge plans/needs. RN/SW Care Coordinator will review chart and outreach to home care every 4 weeks and as needed.      Senia Durán RN / Clinical Care Coordinator     [facility]   [email or web address]  Office :  [phone] / Fax :  [phone]

## 2018-09-12 NOTE — TELEPHONE ENCOUNTER
I did send in her insulin.   It will still say to take 35 units BID, but I advised decreasing to 30u BID and to send me in her blood sugar numbers next week.  Or to call them in.

## 2018-09-12 NOTE — TELEPHONE ENCOUNTER
Reason for Call:  Form, our goal is to have forms completed with 72 hours, however, some forms may require a visit or additional information.    Type of letter, form or note:  medical    Who is the form from?: [employer] (if other please explain)    Where did the form come from: form was faxed in    What clinic location was the form placed at?: [facility]    Where the form was placed: 's Box    What number is listed as a contact on the form?: NA       Additional comments: needs to be filled out ASAP.    Call taken on 9/12/2018 at 3:41 PM by Shayna Rawls

## 2018-09-12 NOTE — PROGRESS NOTES
"  SUBJECTIVE:   Mariel Ribeiro is a 73 year old female who presents to clinic today for the following health issues:        Hospital Follow-up Visit:    Hospital/Nursing Home/IP Rehab Facility: [facility]  Date of Admission: 9/9/2018  Date of Discharge: 9/10/2018  Reason(s) for Admission: fall             Problems taking medications regularly:  None       Medication changes since discharge: None       Problems adhering to non-medication therapy:  None  Additional: pt report she is still having back pain, questions on medication      Summary of hospitalization:  [facility] discharge summary reviewed  Diagnostic Tests/Treatments reviewed.  Follow up needed: physical therapy  Other Healthcare Providers Involved in Patient s Care:         None  Update since discharge: improved.     Post Discharge Medication Reconciliation: discharge medications reconciled and changed, per note/orders (see AVS).  Plan of care communicated with patient and caregiver     Coding guidelines for this visit:  Type of Medical   Decision Making Face-to-Face Visit       within 7 Days of discharge Face-to-Face Visit        within 14 days of discharge   Moderate Complexity [id number]   High Complexity [id number]          Mariel had slipped and hurt her back; then, she had two times where, at night, her legs \"gave out\" and she fell, resulting in a hospitalization.  Imaging showed no fractures or other acute findings; MRI showed arthritis in the T and L spine, moderate to severe spinal canal stenosis L2-3, moderate canal stenosis at C5-6 and L4-5, and moderate bilateral neural foraminal stenosis at lL2-3 and on the left at L4-5.  She had taken flexeril with trazodone at night, so wonders if this caused her legs to give out.  She continues to have pain between her shoulder blades and in the middle of her low back, no radiation. Taking tylenol 625mg was somewhat helpful.     With regards to her diabetes, she was " transitioned to insulin and placed on 35 unit(s) BID of Humulin N, and she was started on Prandin 1mg tab TID with meals last hospitalization. She states that fasting blood sugars are no higher than 125.  At noon, she often goes to 40-60 with her blood sugar.  She doesn't know if she is also going too low at night.    Her urine sample in the hospital did not show UTI but lots of LE; she endorses itching and feeling like she has a yeast infection.  She still has some diflucan at home.      Problem list and histories reviewed & adjusted, as indicated.  Additional history: as documented    Patient Active Problem List   Diagnosis     Hypothyroidism      HX PHYSICAL ABUSE     Coronary atherosclerosis     Myalgia and myositis     Migraine     Chronic airway obstruction/ COPD     Mononeuritis     FAMILY HX-THROMBOSIS     Obstructive sleep apnea     Vitamin D deficiency     Hyperuricemia     Hypertension goal BP (blood pressure) < 130/80     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)     Hyperlipidemia with target LDL less than 70     Chronic diastolic heart failure (H)     Intermediate coronary syndrome (H)     Osteoarthritis     Morbid obesity (H)     Arthritis of knee     Constipation     Hypokalemia     Transient cerebral ischemia     Pain in joint of left shoulder     History of thyroid cancer     Cervicalgia     Cholelithiasis     Pancreatitis, acute     Fatty liver disease, nonalcoholic     Hepatomegaly     Angina at rest (H)     S/P CABG x 3     Type 2 diabetes mellitus with stage 3 chronic kidney disease, with long-term current use of insulin (H)     Lymphedema     Syncope     Lower back pain     Falls     Thrombocytopenia (H)     Bilateral carotid artery disease (H)     Past Surgical History:   Procedure Laterality Date     ANGIOGRAPHY  8/11    with stent placement X2 mid- and proximal LAD     ARTHROPLASTY KNEE  1/28/2014    Procedure: ARTHROPLASTY KNEE;  ARTHROPLASTY KNEE -RIGHT TOTAL  ;  Surgeon: Victor Manuel Wolff,  MD;  Location: [facility] OR     BYPASS GRAFT ARTERY CORONARY N/A 7/2/2018    Procedure: BYPASS GRAFT ARTERY CORONARY;  Median Sternotomy, On Cardiopulmonary Bypass Pump, Coronary Artery Bypass Graft x 3, using Left Internal Mammary and Endoscopic Vein [place] on Bilateral Saphenous Vein, Transesophageal Echocardiogram, Epi-aortic Ultrasound;  Surgeon: Joao Mason MD;  Location: [facility] OR     C TOTAL KNEE ARTHROPLASTY  7/30/04    Knee Replacement, Total right and left     CATARACT IOL, RT/LT Bilateral      COLONOSCOPY       DILATION AND CURETTAGE, OPERATIVE HYSTEROSCOPY WITH MORCELLATOR, COMBINED N/A 11/1/2016    Procedure: COMBINED DILATION AND CURETTAGE, OPERATIVE HYSTEROSCOPY WITH MORCELLATOR;  Surgeon: Stacey Arnold DO;  Location: [facility]     HC INTRODUCE NEEDLE/CATH, EXTREMITY ARTERY  1999,2002,2004    Angiocardiogram     HC KNEE SCOPE, DIAGNOSTIC  1990's    Arthroscopy, Knee, bilateral     LAPAROSCOPIC CHOLECYSTECTOMY N/A 8/11/2017    Procedure: LAPAROSCOPIC CHOLECYSTECTOMY;  Laparoscopic Cholecystectomy   *Latex Allergy*, Anesthesia Block;  Surgeon: Jeffrey Roberson MD;  Location: [facility] OR     PHACOEMULSIFICATION CLEAR CORNEA WITH STANDARD INTRAOCULAR LENS IMPLANT Right 12/29/2014    Procedure: PHACOEMULSIFICATION CLEAR CORNEA WITH STANDARD INTRAOCULAR LENS IMPLANT;  Surgeon: Smith Quintana MD;  Location: [facility]     PHACOEMULSIFICATION CLEAR CORNEA WITH TORIC INTRAOCULAR LENS IMPLANT Left 1/12/2015    Procedure: PHACOEMULSIFICATION CLEAR CORNEA WITH TORIC INTRAOCULAR LENS IMPLANT;  Surgeon: Smith Quintana MD;  Location: [facility]     SURGICAL HISTORY OF -   1963    dentures     SURGICAL HISTORY OF -   1985    thyroidectomy     SURGICAL HISTORY OF -   1998    right thumb surgery     SURGICAL HISTORY OF -   2001    right breast biopsy (benign)     SURGICAL HISTORY OF -   04/2004    left shoulder surgery - rotator cuff     SURGICAL HISTORY OF -   4/09    left thumb surgery     THYROIDECTOMY          Social History   Substance Use Topics     Smoking status: Former Smoker     Packs/day: 0.00     Years: 27.00     Types: Cigarettes     Quit date: 7/1/1989     Smokeless tobacco: Never Used     Alcohol use No     Family History   Problem Relation Age of Onset     C.A.D. Mother      Diabetes Mother      Hypertension Mother      Blood Disease Mother      multiple episodes of thrombosis     Circulatory Mother      DVT X 2; ocular clot; cerebral; carotid artery stenosis     Glaucoma Mother      Macular Degeneration Mother      C.A.D. Father      Hypertension Father      Cerebrovascular Disease Father      Alcohol/Drug Father      etoh     Cancer Brother      liver,pancreas, brain     Cardiovascular Sister      Hypertension Sister      Hypertension Brother      Alcohol/Drug Sister      etoh     Alcohol/Drug Brother      drug     Diabetes Sister      younger     C.A.D. Sister      CABG age 65     C.A.D. Brother      CABG age 42     C.A.D. Sister      stents age 58     C.A.D. Brother      Genitourinary Problems Sister      kidney disease         Current Outpatient Prescriptions   Medication Sig Dispense Refill     acetaminophen (TYLENOL) 325 MG tablet Take 2 tablets (650 mg) by mouth every 4 hours as needed for mild pain (Patient taking differently: Take 650 mg by mouth every 6 hours as needed for mild pain ) 100 tablet      albuterol (PROAIR HFA) 108 (90 Base) MCG/ACT inhaler Inhale 1-2 puffs into the lungs every 4 hours as needed for wheezing 1 Inhaler PRN     aspirin  MG tablet Take 1 tablet by mouth daily. 30 tablet 0     atorvastatin (LIPITOR) 40 MG tablet Take 1 tablet (40 mg) by mouth daily 30 tablet 0     B-D U/F insulin pen needle USE FOR INSULIN INJECTION 100 each 0     blood glucose monitoring (NO BRAND SPECIFIED) test strip 1 strip by In Vitro route 2 times daily Strips per patient's preference 200 each 3     bumetanide (BUMEX) 2 MG tablet Take 1 tablet (2 mg) by mouth 2 times daily 60 tablet 1      clotrimazole (LOTRIMIN) 1 % cream Apply topically 2 times daily 60 g 3     cyclobenzaprine (FLEXERIL) 10 MG tablet Take 10 mg by mouth daily as needed for muscle spasms Does not take tizanidine or trazodone if using cyclobenzaprine       EPINEPHrine (EPIPEN/ADRENACLICK/OR ANY BX GENERIC EQUIV) 0.3 MG/0.3ML injection 2-pack Inject 0.3 mLs (0.3 mg) into the muscle once as needed for anaphylaxis 0.6 mL 0     escitalopram (LEXAPRO) 20 MG tablet Take 1 tablet (20 mg) by mouth every morning 30 tablet 0     febuxostat (ULORIC) 40 MG TABS tablet Take 40 mg by mouth every evening       ferrous gluconate (FERGON) 324 (38 Fe) MG tablet Take 1 tablet (324 mg) by mouth Every Mon, Wed, Fri Morning 36 tablet 3     fluconazole (DIFLUCAN) 150 MG tablet Take 1 tablet (150 mg) by mouth every 3 days 4 tablet 0     FOLIC ACID PO Take 1 mg by mouth daily        guaiFENesin (MUCINEX) 600 MG 12 hr tablet Take 1 tablet (600 mg) by mouth 2 times daily       insulin isophane human (HUMULIN N PEN) 100 UNIT/ML injection Inject 35 Units Subcutaneous 2 times daily 15 mL 3     insulin syringes, disposable, U-100 1 ML MISC 1 each 5 times daily 100 each 11     ketamine, KETALAR, 5%-gabapentin, NEURONTIN, 8% 5%-8% GEL topical PLO cream Apply 30 g topically 3 times daily 30 g 0     levothyroxine (SYNTHROID/LEVOTHROID) 150 MCG tablet Take 1 tablet (150 mcg) by mouth daily 30 tablet 0     Trigemina FINEPOINT LANCETS MISC Use to test blood sugars 2 times daily or as directed. (Patient taking differently: Use to test blood sugars 5 times daily or as directed.) 100 each prn     losartan (COZAAR) 25 MG tablet Take 1 tablet (25 mg) by mouth daily 30 tablet 3     Metoprolol Tartrate 75 MG TABS Take 75 mg by mouth 2 times daily 180 tablet 3     miconazole (MICATIN; MICRO GUARD) 2 % powder Apply topically 2 times daily 71 g 1     niacin (NIASPAN) 500 MG CR tablet Take 500 mg by mouth daily   9     nitroGLYcerin (NITROSTAT) 0.4 MG sublingual tablet For chest pain  place 1 tablet under the tongue every 5 minutes for 3 doses. If symptoms persist 5 minutes after 1st dose call 911. 25 tablet 0     ONE TOUCH ULTRA (DEVICES) MISC test blood sugar BID (Patient taking differently: test blood sugar 5 times per day) 1 0     polyethylene glycol (MIRALAX/GLYCOLAX) Packet Take 17 g by mouth daily (Patient taking differently: Take 17 g by mouth daily as needed ) 7 packet 0     potassium chloride SA (K-DUR/KLOR-CON M) 10 MEQ CR tablet Take 4 tablets (40 mEq) by mouth 3 times daily 360 tablet 3     pregabalin (LYRICA) 100 MG capsule Take 1 capsule (100 mg) by mouth 2 times daily 90 capsule 0     repaglinide (PRANDIN) 1 MG tablet Take 1 tablet (1 mg) by mouth 3 times daily (before meals) 90 tablet 3     senna-docusate (SENOKOT-S;PERICOLACE) 8.6-50 MG per tablet Take 1-2 tablets by mouth 2 times daily as needed for constipation (Patient taking differently: Take 1-2 tablets by mouth 2 times daily ) 30 tablet 0     TIZANIDINE HCL PO Take 4 mg by mouth At Bedtime        traZODone (DESYREL) 50 MG tablet Take 3 tablets (150 mg) by mouth At Bedtime 270 tablet 1     VITAMIN D, CHOLECALCIFEROL, PO Take 10,000 Units by mouth daily       Allergies   Allergen Reactions     Contrast Dye Anaphylaxis     Fish Allergy Anaphylaxis     Iodine Anaphylaxis     Oxycodone Other (See Comments)     Severe suicidal tendencies on this medication     Tree Nuts [Nuts] Anaphylaxis     Tree nuts only (peanuts ok)     Albuterol Other (See Comments)     Sandrita-oral erythema  Confirmed 7/3/18 that patient uses albuterol inhalers at home. Patient had her home inhaler in her bag.     Bactrim      Increased uric acid     Betadine [Povidone Iodine] Hives, Swelling and Difficulty breathing     Betadine     Combivent      Rash     Dulaglutide Other (See Comments)     Hx . Of thyroid cancer.     Lisinopril Other (See Comments)     Scr/grf severely reduced.      Penicillins Rash     Allopurinol Rash     Latex Rash     Wool Fiber Rash        Reviewed and updated as needed this visit by clinical staff  Tobacco  Allergies  Meds  Med Hx  Surg Hx  Fam Hx  Soc Hx      Reviewed and updated as needed this visit by Provider         ROS:  Constitutional, HEENT, cardiovascular, pulmonary, gi and gu systems are negative, except as otherwise noted.    OBJECTIVE:     /75 (BP Location: Right arm, Patient Position: Sitting, Cuff Size: Adult Regular)  Pulse 64  Temp 97.4  F (36.3  C) (Oral)  Resp 18  Wt 288 lb (130.6 kg)  SpO2 95%  BMI 46.48 kg/m2  Body mass index is 46.48 kg/(m^2).  GENERAL APPEARANCE: healthy, alert and no distress  EYES: Eyes grossly normal to inspection, PERRL and conjunctivae and sclerae normal  RESP: lungs clear to auscultation - no rales, rhonchi or wheezes  CV: regular rates and rhythm, normal S1 S2, no S3 or S4 and no murmur, click or rub        ASSESSMENT/PLAN:             1. Acute midline low back pain without sciatica  2. Pain in thoracic spine  Her hospital providers had wanted her to have TCU, but this was not possible due to insurance, so I will order home health physical therapy for her back pain, which she already has for other issues.  She may increase tylenol up to 1000mg TID.  I had a discussion with Mariel and geeta about the risk of falling on medication such as muscle relaxants, opiates, as well as her sleeping aid.  I think that medication side effect and/or hypoglycemia is the likely cause of falls, but given the canal stenosis, I will also offer her referral to neurosurgery.    3. Type 2 diabetes mellitus with stage 3 chronic kidney disease, with long-term current use of insulin (H)  She is reportedly having hypoglycemia, which is obvious as her A1c has dropped from 9.3 two months ago to 5.2 now.  Decrease Humulin N to 30 units BID, likely needs even less, patient to send in her blood sugar results next week.  She was recommended after her recent cardiac hospitalization to f/u with endocrinology; this  appt is not until next month, so I will manage along with MTM.  Continue prandin.  Discussed how to treat hypoglycemia.  Discussed the need to eat regular meals while taking insulin.  - insulin isophane human (HUMULIN N PEN) 100 UNIT/ML injection; Inject 35 Units Subcutaneous 2 times daily  Dispense: 15 mL; Refill: 3    4. Morbid obesity due to excess calories (H)      5. Thrombocytopenia (H)  stable    6. Chronic diastolic heart failure (H)  Per cardiology    7. Iron deficiency anemia secondary to inadequate dietary iron intake    - ferrous gluconate (FERGON) 324 (38 Fe) MG tablet; Take 1 tablet (324 mg) by mouth Every Mon, Wed, Fri Morning  Dispense: 36 tablet; Refill: 3    8. Need for prophylactic vaccination and inoculation against influenza    - FLU VACCINE, INCREASED ANTIGEN, PRESV FREE, AGE 65+ [94489]  - Vaccine Administration, Initial [37035]    9. Yeast vaginitis--patient will take her home fluconazole (she has two doses) and report back if still symptomatic.      Rafaela William MD  [facility]

## 2018-09-12 NOTE — MR AVS SNAPSHOT
After Visit Summary   9/12/2018    Mariel Ribeiro    MRN: [id number]           Patient Information     Date Of Birth          1945        Visit Information        Provider Department      9/12/2018 9:20 AM Rafaela William MD [facility]        Today's Diagnoses     Morbid obesity due to excess calories (H)        Thrombocytopenia (H)        Angina at rest (H)        Bilateral carotid artery disease (H)        Chronic diastolic heart failure (H)        Iron deficiency anemia secondary to inadequate dietary iron intake          Care Instructions    Take tylenol 1000mg up to three times daily as needed for pain.     Take fluconazole (Diflucan) one tab today for yeast infection.  Ok to repeat the dose after three days if you still have vaginal symptoms.                 Follow-ups after your visit        Your next 10 appointments already scheduled     Sep 20, 2018 11:00 AM CDT   XR VIDEO SPEECH EVALUATION WITH ESOPHAGRAM with UCXR2, UC GIGU [facility] ([facility])    [address] [place] [id number] [phone]           How do I prepare for my exam? (Food and drink instructions) Do not eat for 4 hours before the exam. Keep drinking clear liquids until 2 hours before the exam.  How do I prepare for my exam? (Other instructions) You may take pain medicine (with a sip of water) up to 4 hours before the exam. Do not swallow any other medicines unless your doctor tells you to. Talk to your doctor to be sure it s safe to stop your medicines.  What should I wear: Wear comfortable clothes.  How long does the exam take: The exam usually takes about 30-45 minutes.  What should I bring: Bring a list of your current medicines to your exam. (Include vitamins, minerals and over-the-counter medicines.) Leave your valuables at home. Do I need a :  No  is needed.  What do I need to tell my doctor:  If you  think you might be pregnant, tell your doctor.  What should I do after the exam: Drink lots of fluids in the next one to two days. This will help you pass the rest of the barium. Your stool may be white. Take walks if possible. You may take a mild laxative (medicine to loosten your stools), but check with your doctor first. If you don t have a bowel movement within two days, call your doctor.  What is this test: This X-ray exam look at your esophagus. This exam uses barium (a white liquid) to help the X-rays show up more clearly.  Who should I call with questions: If you have any questions, please call the Imaging Department where you will have your exam. Directions, parking instructions, and other information is available on our website, [email or web address].            Sep 20, 2018 11:00 AM CDT   Video Swallow with GERARD Topete   [facility] ([facility])    [address]  4th Floor  [facility] [id number] [phone]           Please check in for this visit in Imaging on the 1st floor.            Sep 26, 2018  8:00 AM CDT   New Sleep Patient with Noah Girard MD   [facility] [place] ([facility])    [address] [place] [id number] [phone]            Oct 09, 2018  6:00 PM CDT   Lab with  LAB    [facility] ([facility])    [address] [place] [address]  1st Floor  [facility] [id number] [phone]            Oct 09, 2018  6:30 PM CDT   (Arrive by 6:15 PM)   CORE RETURN with Starla Saez NP   [facility] ([facility])    [address] [place] [address]  [address] [place] [id number] [phone]            Oct 18, 2018  1:30 PM CDT   (Arrive by 1:15 PM)   RETURN DIABETES with Odalis Medley PA-C   [facility] ([facility])    [address] [place] [address]  Floor  [facility] [id number] [phone]            Jan 14, 2019  2:30 PM CST   (Arrive by 2:15 PM)   RETURN DIABETES with Keven Jaeger MD   [facility] ([facility])    [address]  3rd [facility] [id number] [phone]              Who to contact     If you have questions or need follow up information about today's clinic visit or your schedule please contact [facility] directly at [phone].  Normal or non-critical lab and imaging results will be communicated to you by [employer], letter or phone within 4 business days after the clinic has received the results. If you do not hear from us within 7 days, please contact the clinic through [employer] or phone. If you have a critical or abnormal lab result, we will notify you by phone as soon as possible.  Submit refill requests through [employer] or call your pharmacy and they will forward the refill request to us. Please allow 3 business days for your refill to be completed.          Additional Information About Your Visit        [employer] Information     [employer] gives you secure access to your electronic health record. If you see a primary care provider, you can also send messages to your care team and make appointments. If you have questions, please call your primary care clinic.  If you do not have a primary care provider, please call [phone] and they will assist you.        Care EveryWhere ID     This is your Care EveryWhere ID. This could be used by other organizations to access your [place] medical records  [id number]        Your Vitals Were     Pulse Temperature Respirations Pulse Oximetry BMI (Body Mass Index)       64 97.4  F (36.3  C) (Oral) 18 95% 46.48 kg/m2        Blood Pressure from Last 3 Encounters:   09/12/18 121/75   09/10/18 127/54   09/06/18 (!) 88/58    Weight from Last 3 Encounters:   09/12/18 288 lb (130.6 kg)   09/10/18 292 lb (132.5 kg)    09/06/18 293 lb 14.4 oz (133.3 kg)              Today, you had the following     No orders found for display         Today's Medication Changes          These changes are accurate as of 9/12/18 10:01 AM.  If you have any questions, ask your nurse or doctor.               These medicines have changed or have updated prescriptions.        Dose/Directions    acetaminophen 325 MG tablet   Commonly known as:  TYLENOL   This may have changed:  when to take this   Used for:  S/P CABG x 3, Acute post-operative pain        Dose:  650 mg   Take 2 tablets (650 mg) by mouth every 4 hours as needed for mild pain   Quantity:  100 tablet   Refills:  0       [employer] Integrated PathologyCAN FINEPOINT LANCETS Misc   This may have changed:  additional instructions   Used for:  Type 2 diabetes mellitus with diabetic polyneuropathy, without long-term current use of insulin (H)        Use to test blood sugars 2 times daily or as directed.   Quantity:  100 each   Refills:  prn       ONE TOUCH ULTRA (DEVICES) Misc   This may have changed:  See the new instructions.   Used for:  Type II or unspecified type diabetes mellitus without mention of complication, not stated as uncontrolled        test blood sugar BID   Quantity:  1   Refills:  0       polyethylene glycol Packet   Commonly known as:  MIRALAX/GLYCOLAX   This may have changed:    - when to take this  - reasons to take this   Used for:  Atherosclerosis of native coronary artery without angina pectoris, unspecified whether native or transplanted heart        Dose:  17 g   Take 17 g by mouth daily   Quantity:  7 packet   Refills:  0       senna-docusate 8.6-50 MG per tablet   Commonly known as:  SENOKOT-S;PERICOLACE   This may have changed:  when to take this   Used for:  Atherosclerosis of native coronary artery without angina pectoris, unspecified whether native or transplanted heart        Dose:  1-2 tablet   Take 1-2 tablets by mouth 2 times daily as needed for constipation   Quantity:  30 tablet    Refills:  0                Primary Care Provider Office Phone # Fax #    Rafaela William [phone] [address]  [place] [address]        Equal Access to Services     KJ SALVADOR : Hadclark von santillan janeth Sopasha, waaxda luqadaha, qaybta kaalmada adeegyada, gopal winkler mingogladys curry becky bird. So [facility] [phone].    ATENCIÓN: Si habla español, tiene a gillespie disposición servicios gratuitos de asistencia lingüística. Llame al [phone].    We comply with applicable federal civil rights laws and [place] laws. We do not discriminate on the basis of race, color, national origin, age, disability, sex, sexual orientation, or gender identity.            Thank you!     Thank you for choosing [facility]  for your care. Our goal is always to provide you with excellent care. Hearing back from our patients is one way we can continue to improve our services. Please take a few minutes to complete the written survey that you may receive in the mail after your visit with us. Thank you!             Your Updated Medication List - Protect others around you: Learn how to safely use, store and throw away your medicines at www.disposemymeds.org.          This list is accurate as of 9/12/18 10:01 AM.  Always use your most recent med list.                   Brand Name Dispense Instructions for use Diagnosis    acetaminophen 325 MG tablet    TYLENOL    100 tablet    Take 2 tablets (650 mg) by mouth every 4 hours as needed for mild pain    S/P CABG x 3, Acute post-operative pain       albuterol 108 (90 Base) MCG/ACT inhaler    PROAIR HFA    1 Inhaler    Inhale 1-2 puffs into the lungs every 4 hours as needed for wheezing    Chronic obstructive pulmonary disease, unspecified COPD type (H)       aspirin 325 MG EC tablet     30 tablet    Take 1 tablet by mouth daily.        atorvastatin 40 MG tablet    LIPITOR    30 tablet    Take 1 tablet (40 mg) by mouth daily     Atherosclerosis of native coronary artery without angina pectoris, unspecified whether native or transplanted heart       B-D U/F 31G X 5 MM   Generic drug:  insulin pen needle     100 each    USE FOR INSULIN INJECTION    Type 2 diabetes mellitus with diabetic polyneuropathy, without long-term current use of insulin (H)       blood glucose monitoring test strip    no brand specified    200 each    1 strip by In Vitro route 2 times daily Strips per patient's preference    Type 2 diabetes mellitus with diabetic polyneuropathy, without long-term current use of insulin (H)       bumetanide 2 MG tablet    BUMEX    60 tablet    Take 1 tablet (2 mg) by mouth 2 times daily    Chronic diastolic heart failure (H)       clotrimazole 1 % cream    LOTRIMIN    60 g    Apply topically 2 times daily    Dermatitis       cyclobenzaprine 10 MG tablet    FLEXERIL     Take 10 mg by mouth daily as needed for muscle spasms Does not take tizanidine or trazodone if using cyclobenzaprine        EPINEPHrine 0.3 MG/0.3ML injection 2-pack    EPIPEN/ADRENACLICK/or ANY BX GENERIC EQUIV    0.6 mL    Inject 0.3 mLs (0.3 mg) into the muscle once as needed for anaphylaxis    Allergic reaction, subsequent encounter       escitalopram 20 MG tablet    LEXAPRO    30 tablet    Take 1 tablet (20 mg) by mouth every morning    Recurrent major depressive disorder, in partial remission (H)       febuxostat 40 MG Tabs tablet    ULORIC     Take 40 mg by mouth every evening        ferrous gluconate 324 (38 Fe) MG tablet    FERGON    12 tablet    Take 1 tablet (324 mg) by mouth Every Mon, Wed, Fri Morning    Chronic diastolic heart failure (H), Iron deficiency anemia secondary to inadequate dietary iron intake       fluconazole 150 MG tablet    DIFLUCAN    4 tablet    Take 1 tablet (150 mg) by mouth every 3 days    Candidiasis of vulva and vagina       FOLIC ACID PO      Take 1 mg by mouth daily        guaiFENesin 600 MG 12 hr tablet    MUCINEX     Take 1 tablet  (600 mg) by mouth 2 times daily        insulin isophane human 100 UNIT/ML injection    HumuLIN N PEN     Inject 35 Units Subcutaneous 2 times daily        insulin syringes (disposable) U-100 1 ML Misc     100 each    1 each 5 times daily    Type 2 diabetes mellitus with chronic kidney disease, without long-term current use of insulin, unspecified CKD stage (H)       ketamine (KETALAR) 5%-gabapentin (NEURONTIN) 8% 5%-8% Gel topical PLO cream     30 g    Apply 30 g topically 3 times daily    Mononeuritis       levothyroxine 150 MCG tablet    SYNTHROID/LEVOTHROID    30 tablet    Take 1 tablet (150 mcg) by mouth daily    Other specified hypothyroidism       LIFESCAN FINEPOINT LANCETS Misc     100 each    Use to test blood sugars 2 times daily or as directed.    Type 2 diabetes mellitus with diabetic polyneuropathy, without long-term current use of insulin (H)       losartan 25 MG tablet    COZAAR    30 tablet    Take 1 tablet (25 mg) by mouth daily    Chronic diastolic heart failure (H)       Metoprolol Tartrate 75 MG Tabs     180 tablet    Take 75 mg by mouth 2 times daily    Atherosclerosis of native coronary artery without angina pectoris, unspecified whether native or transplanted heart       miconazole 2 % powder    MICATIN; MICRO GUARD    71 g    Apply topically 2 times daily    Dermatitis       niacin 500 MG CR tablet    NIASPAN     Take 500 mg by mouth daily        nitroGLYcerin 0.4 MG sublingual tablet    NITROSTAT    25 tablet    For chest pain place 1 tablet under the tongue every 5 minutes for 3 doses. If symptoms persist 5 minutes after 1st dose call 911.    Atherosclerosis of native coronary artery without angina pectoris, unspecified whether native or transplanted heart       ONE TOUCH ULTRA (DEVICES) Misc     1    test blood sugar BID    Type II or unspecified type diabetes mellitus without mention of complication, not stated as uncontrolled       polyethylene glycol Packet    MIRALAX/GLYCOLAX    7  packet    Take 17 g by mouth daily    Atherosclerosis of native coronary artery without angina pectoris, unspecified whether native or transplanted heart       potassium chloride SA 10 MEQ CR tablet    K-DUR/KLOR-CON M    360 tablet    Take 4 tablets (40 mEq) by mouth 3 times daily    Chronic diastolic heart failure (H)       pregabalin 100 MG capsule    LYRICA    90 capsule    Take 1 capsule (100 mg) by mouth 2 times daily    Pain in joint of left shoulder       repaglinide 1 MG tablet    PRANDIN    90 tablet    Take 1 tablet (1 mg) by mouth 3 times daily (before meals)    Type 2 diabetes mellitus with diabetic chronic kidney disease, unspecified CKD stage, unspecified long term insulin use status (H)       senna-docusate 8.6-50 MG per tablet    SENOKOT-S;PERICOLACE    30 tablet    Take 1-2 tablets by mouth 2 times daily as needed for constipation    Atherosclerosis of native coronary artery without angina pectoris, unspecified whether native or transplanted heart       TIZANIDINE HCL PO      Take 4 mg by mouth At Bedtime        traZODone 50 MG tablet    DESYREL    270 tablet    Take 3 tablets (150 mg) by mouth At Bedtime    Recurrent major depressive disorder, in partial remission (H)       VITAMIN D (CHOLECALCIFEROL) PO      Take 10,000 Units by mouth daily

## 2018-09-12 NOTE — PROGRESS NOTES
[facility] now requests orders and shares plan of care/discharge summaries for some patients through [employer].  Please REPLY TO THIS MESSAGE OR ROUTE BACK TO THE AUTHOR in order to give authorization for orders when needed.  This is considered a verbal order, you will still receive a faxed copy of orders for signature.  Thank you for your assistance in improving collaboration for our patients.    ORDER  Requesting OK to continue OT lymphedema therapy treatment for BLE edema reduction 2x week for 2 weeks.     JOE Dinh/DOMINIC, CLT  [phone]  [email or web address]

## 2018-09-12 NOTE — PROGRESS NOTES

## 2018-09-13 ENCOUNTER — DOCUMENTATION ONLY (OUTPATIENT)
Dept: CARE COORDINATION | Facility: CLINIC | Age: 73
End: 2018-09-13

## 2018-09-13 PROBLEM — M48.02 CERVICAL STENOSIS OF SPINAL CANAL: Status: ACTIVE | Noted: 2018-09-13

## 2018-09-13 PROBLEM — M48.061 SPINAL STENOSIS OF LUMBAR REGION, UNSPECIFIED WHETHER NEUROGENIC CLAUDICATION PRESENT: Status: ACTIVE | Noted: 2018-09-13

## 2018-09-13 NOTE — PROGRESS NOTES
She can take tylenol 1000mg daily.  She can apply over the counter topical pain reliever with lidocaine.    She can take the flexeril, but she and Odalis now understand that this can cause falls, so she would need to be monitored if she takes it, and she should not take it with her trazodone.  The same would be true if I prescribed a narcotic, which I'd rather not do (she was previously on high doses of morphine but was able to wean off).  Please see the [employer] message I sent the patient today, discussing that I think she should see a spine specialist about her pain.  Not sure that they would want to do a procedure, but maybe she could get relief with a steroid injection?  Also--what are her blood sugars doing (?tremor from hypoglycemia).

## 2018-09-13 NOTE — PROGRESS NOTES
Dr. William can you clarify again the tylenol dose ?  Patient now has  500 mg tablets and does this mean she can  take 1000 mg 3 x daily or do you just want her to take a total in 24 hours of 1000 mg tylenol?   Odalis was unclear on this.  Monica Gonzalez RN

## 2018-09-13 NOTE — PROGRESS NOTES
Received message from Lymphedema specialist who was out to see the patient today. She states that patient is having increased spasms in her lower extremities and lower back. Also having hand/should tremors occurring every 10-15 minutes. Patient was discharged from hospital 9/10/18 where they discontinued her flexeril which was causing her to have falls. Patient currently only taking tylenol for the pain and spasms but this does not seem to help. She is still rating her pain 8/10. Please advise if she is able to try something different or any recommendations? Nursing will be back out to see her 9/21. If needed please follow up with patient directly.     JODY Russo Case Manager  [phone]

## 2018-09-14 ENCOUNTER — DOCUMENTATION ONLY (OUTPATIENT)
Dept: CARE COORDINATION | Facility: CLINIC | Age: 73
End: 2018-09-14

## 2018-09-14 NOTE — PROGRESS NOTES
[facility] now requests orders and shares plan of care/discharge summaries for some patients through [employer].  Please REPLY TO THIS MESSAGE OR ROUTE BACK TO THE AUTHOR in order to give authorization for orders when needed.  This is considered a verbal order, you will still receive a faxed copy of orders for signature.  Thank you for your assistance in improving collaboration for our patients.    ORDER: Continued home PT 2w4 for low back and thoracic pain, LE strengthening, improved activity tolerance, gait training, and fall prevention.    [email or web address]  [phone]

## 2018-09-17 ENCOUNTER — TELEPHONE (OUTPATIENT)
Dept: CARDIOLOGY | Facility: CLINIC | Age: 73
End: 2018-09-17

## 2018-09-17 NOTE — TELEPHONE ENCOUNTER
[facility] Call Center    Phone Message    May a detailed message be left on voicemail: yes    Reason for Call: Other: s/p CABG 7/2/18  can she sleep on her stomach now?     Action Taken: Message routed to:  [facility] cardiology clinic

## 2018-09-18 ENCOUNTER — TELEPHONE (OUTPATIENT)
Dept: FAMILY MEDICINE | Facility: CLINIC | Age: 73
End: 2018-09-18

## 2018-09-18 DIAGNOSIS — M48.061 SPINAL STENOSIS OF LUMBAR REGION, UNSPECIFIED WHETHER NEUROGENIC CLAUDICATION PRESENT: ICD-10-CM

## 2018-09-18 DIAGNOSIS — M54.50 LOWER BACK PAIN: Primary | ICD-10-CM

## 2018-09-18 NOTE — TELEPHONE ENCOUNTER
Called Odalis back and left a voicemail message.  Waiting for return call.    Patient's roommate called back and states patient wants to know if she can lie on her stomach for chiropractor work.  Instructed Odalis to have patient wait full 3 months post op before going to chiropractor and then if it hurts stop the treatment an wait another week and try again until patient can comfortably complete treatment.

## 2018-09-18 NOTE — TELEPHONE ENCOUNTER
[facility] called and would like to talk to either Nataliia or an RN they did not want to discuss what the reason was for. There phone number is [phone]. Please Advise thank you

## 2018-09-18 NOTE — TELEPHONE ENCOUNTER
Writer called [facility] & they wanted to know the status of forms (SEE TE 9/12). Per [facility], pt is requesting for a lumbar back brace. Writer also contacted Pt who stated that she would like a brace. Please advise, thank you!    *[facility] will be re-faxing forms again in case PCP does not have them*

## 2018-09-18 NOTE — TELEPHONE ENCOUNTER
Dr. William-Please review.    Writer pended physical therapy order, as it is writer's understanding PT can evaluate for need, and give recommendations for type of back brace appropriate for patient.    Thank you!  SONIA RobertsN, RN

## 2018-09-18 NOTE — TELEPHONE ENCOUNTER
Writer called patient and asked specifically if she has been in contact with [facility] regarding a back brace order from Dr. William.    Patient denied this and stated she has not requested back brace.    Routing to Patient Care Supervisor, Clinic Administrator and Medical Director.    Please review.    Thank you!  CHEN Rivers, SONIAN, RN

## 2018-09-18 NOTE — TELEPHONE ENCOUNTER
Team coordinators-Please call [facility] to find out why they are asking to speak with provider or nurse. If not willing to give additional information, they are welcome to submit request in writing via fax.    No consent to communicate on file.    Thank you!  CHEN Rivers, BSN, RN

## 2018-09-18 NOTE — TELEPHONE ENCOUNTER
I did not order a brace.  I was out of the office when the paperwork came in, so I had Monica E call the patient, who knew nothing about this.      I do not think this is a legitimate request.  The patient can discuss it with us if I am wrong.

## 2018-09-20 ENCOUNTER — RADIANT APPOINTMENT (OUTPATIENT)
Dept: GENERAL RADIOLOGY | Facility: CLINIC | Age: 73
End: 2018-09-20
Attending: FAMILY MEDICINE
Payer: MEDICARE

## 2018-09-20 ENCOUNTER — THERAPY VISIT (OUTPATIENT)
Dept: SPEECH THERAPY | Facility: CLINIC | Age: 73
End: 2018-09-20
Attending: FAMILY MEDICINE
Payer: MEDICARE

## 2018-09-20 DIAGNOSIS — T17.908A ASPIRATION INTO AIRWAY, INITIAL ENCOUNTER: ICD-10-CM

## 2018-09-20 DIAGNOSIS — R13.10 DYSPHAGIA, UNSPECIFIED TYPE: ICD-10-CM

## 2018-09-20 RX ORDER — BARIUM SULFATE 400 MG/ML
SUSPENSION ORAL ONCE
Status: COMPLETED | OUTPATIENT
Start: 2018-09-20 | End: 2018-09-20

## 2018-09-20 RX ORDER — BARIUM SULFATE 400 MG/ML
SUSPENSION ORAL ONCE
Status: DISCONTINUED | OUTPATIENT
Start: 2018-09-20 | End: 2019-04-25 | Stop reason: CLARIF

## 2018-09-20 RX ADMIN — BARIUM SULFATE 20 ML: 400 SUSPENSION ORAL at 11:47

## 2018-09-20 NOTE — MR AVS SNAPSHOT
After Visit Summary   9/20/2018    Mariel Ribeiro    MRN: [id number]           Patient Information     Date Of Birth          1945        Visit Information        Provider Department      9/20/2018 11:00 AM Opal Carvajal, SLP [facility]        Today's Diagnoses     Aspiration into airway, initial encounter        Dysphagia, unspecified type           Follow-ups after your visit        Your next 10 appointments already scheduled     Sep 26, 2018  8:00 AM CDT   New Sleep Patient with Noah Girard MD   [facility] ([facility])    [address] [place] [id number]   [phone]            Sep 26, 2018 10:30 AM CDT   (Arrive by 10:15 AM)   New Patient Visit with CHANTAL Soriano CNP   [facility] ([facility])    [address] [place] [id number] [phone]            Oct 09, 2018  6:00 PM CDT   Lab with  LAB   [facility] ([facility])    [address] [place] [id number] [phone]            Oct 09, 2018  6:30 PM CDT   (Arrive by 6:15 PM)   CORE RETURN with Starla Saez NP   [facility] ([facility])    [address] [place] [id number] [phone]            Oct 18, 2018  1:30 PM CDT   (Arrive by 1:15 PM)   RETURN DIABETES with Odalis Medley PA-C   [facility] ([facility])    [address] [place] [id number] [phone]            Jan 14, 2019  2:30 PM CST   (Arrive by 2:15 PM)   RETURN DIABETES with Keven Jaeger MD   [facility] ([facility])    [address] [place] [id number] [phone]              Who to contact     Please call your  clinic at [phone] to:    Ask questions about your health    Make or cancel appointments    Discuss your medicines    Learn about your test results    Speak to your doctor            Additional Information About Your Visit        [employer] Information     [employer] gives you secure access to your electronic health record. If you see a primary care provider, you can also send messages to your care team and make appointments. If you have questions, please call your primary care clinic.  If you do not have a primary care provider, please call [phone] and they will assist you.      [employer] is an electronic gateway that provides easy, online access to your medical records. With [employer], you can request a clinic appointment, read your test results, renew a prescription or communicate with your care team.     To access your existing account, please contact your [facility] or call [phone] for assistance.        Care EveryWhere ID     This is your Care EveryWhere ID. This could be used by other organizations to access your [place] medical records  [id number]         Blood Pressure from Last 3 Encounters:   09/12/18 121/75   09/10/18 127/54   09/06/18 (!) 88/58    Weight from Last 3 Encounters:   09/12/18 130.6 kg (288 lb)   09/10/18 132.5 kg (292 lb)   09/06/18 133.3 kg (293 lb 14.4 oz)              Today, you had the following     No orders found for display         Today's Medication Changes          These changes are accurate as of 9/20/18 11:59 PM.  If you have any questions, ask your nurse or doctor.               These medicines have changed or have updated prescriptions.        Dose/Directions    acetaminophen 325 MG tablet   Commonly known as:  TYLENOL   This may have changed:  when to take this   Used for:  S/P CABG x 3, Acute post-operative pain        Dose:  650 mg   Take 2 tablets (650 mg) by mouth every 4 hours as needed for mild pain   Quantity:  100 tablet   Refills:   0       LIFESCAN FINEPOINT LANCETS Misc   This may have changed:  additional instructions   Used for:  Type 2 diabetes mellitus with diabetic polyneuropathy, without long-term current use of insulin (H)        Use to test blood sugars 2 times daily or as directed.   Quantity:  100 each   Refills:  prn       ONE TOUCH ULTRA (DEVICES) Misc   This may have changed:  See the new instructions.   Used for:  Type II or unspecified type diabetes mellitus without mention of complication, not stated as uncontrolled        test blood sugar BID   Quantity:  1   Refills:  0       polyethylene glycol Packet   Commonly known as:  MIRALAX/GLYCOLAX   This may have changed:    - when to take this  - reasons to take this   Used for:  Atherosclerosis of native coronary artery without angina pectoris, unspecified whether native or transplanted heart        Dose:  17 g   Take 17 g by mouth daily   Quantity:  7 packet   Refills:  0       senna-docusate 8.6-50 MG per tablet   Commonly known as:  SENOKOT-S;PERICOLACE   This may have changed:  when to take this   Used for:  Atherosclerosis of native coronary artery without angina pectoris, unspecified whether native or transplanted heart        Dose:  1-2 tablet   Take 1-2 tablets by mouth 2 times daily as needed for constipation   Quantity:  30 tablet   Refills:  0                Primary Care Provider Office Phone # Fax #    Rafaela William [phone] [address] [place] [address]        Goals        General    Pain Management (pt-stated)     Notes - Note created  9/12/2018 11:30 AM by Senia Durán, RN    Goal Statement: I will decrease back pain   Measure of Success: Increased mobility with daily activity   Supportive Steps to Achieve:   I will start taking Tylenol 100 mg 3 times a day per Dr William   I will use heat ice and rest   Barriers: Deconditioned   Strengths: Continues home care services   Date to Achieve By: to be determined   Patient  expressed understanding of goal: yes          Equal Access to Services     [facility] : Hadii aad ku hadpapisha Donapasha, wajamesda luqbrodieha, qalalita kamickiegopal faye. So [facility] [phone].    ATENCIÓN: Si habla español, tiene a gillespie disposición servicios gratuitos de asistencia lingüística. Stephame al [phone].    We comply with applicable federal civil rights laws and [place] laws. We do not discriminate on the basis of race, color, national origin, age, disability, sex, sexual orientation, or gender identity.            Thank you!     Thank you for choosing [facility]  for your care. Our goal is always to provide you with excellent care. Hearing back from our patients is one way we can continue to improve our services. Please take a few minutes to complete the written survey that you may receive in the mail after your visit with us. Thank you!             Your Updated Medication List - Protect others around you: Learn how to safely use, store and throw away your medicines at www.disposemymeds.org.          This list is accurate as of 9/20/18 11:59 PM.  Always use your most recent med list.                   Brand Name Dispense Instructions for use Diagnosis    acetaminophen 325 MG tablet    TYLENOL    100 tablet    Take 2 tablets (650 mg) by mouth every 4 hours as needed for mild pain    S/P CABG x 3, Acute post-operative pain       albuterol 108 (90 Base) MCG/ACT inhaler    PROAIR HFA    1 Inhaler    Inhale 1-2 puffs into the lungs every 4 hours as needed for wheezing    Chronic obstructive pulmonary disease, unspecified COPD type (H)       aspirin 325 MG EC tablet     30 tablet    Take 1 tablet by mouth daily.        atorvastatin 40 MG tablet    LIPITOR    30 tablet    Take 1 tablet (40 mg) by mouth daily    Atherosclerosis of native coronary artery without angina pectoris, unspecified whether native or transplanted heart       B-D U/F 31G X 5 MM   Generic drug:   insulin pen needle     100 each    USE FOR INSULIN INJECTION    Type 2 diabetes mellitus with diabetic polyneuropathy, without long-term current use of insulin (H)       blood glucose monitoring test strip    no brand specified    200 each    1 strip by In Vitro route 2 times daily Strips per patient's preference    Type 2 diabetes mellitus with diabetic polyneuropathy, without long-term current use of insulin (H)       bumetanide 2 MG tablet    BUMEX    60 tablet    Take 1 tablet (2 mg) by mouth 2 times daily    Chronic diastolic heart failure (H)       clotrimazole 1 % cream    LOTRIMIN    60 g    Apply topically 2 times daily    Dermatitis       cyclobenzaprine 10 MG tablet    FLEXERIL     Take 10 mg by mouth daily as needed for muscle spasms Does not take tizanidine or trazodone if using cyclobenzaprine        EPINEPHrine 0.3 MG/0.3ML injection 2-pack    EPIPEN/ADRENACLICK/or ANY BX GENERIC EQUIV    0.6 mL    Inject 0.3 mLs (0.3 mg) into the muscle once as needed for anaphylaxis    Allergic reaction, subsequent encounter       escitalopram 20 MG tablet    LEXAPRO    30 tablet    Take 1 tablet (20 mg) by mouth every morning    Recurrent major depressive disorder, in partial remission (H)       febuxostat 40 MG Tabs tablet    ULORIC     Take 40 mg by mouth every evening        ferrous gluconate 324 (38 Fe) MG tablet    FERGON    36 tablet    Take 1 tablet (324 mg) by mouth Every Mon, Wed, Fri Morning    Chronic diastolic heart failure (H), Iron deficiency anemia secondary to inadequate dietary iron intake       fluconazole 150 MG tablet    DIFLUCAN    4 tablet    Take 1 tablet (150 mg) by mouth every 3 days    Candidiasis of vulva and vagina       FOLIC ACID PO      Take 1 mg by mouth daily        guaiFENesin 600 MG 12 hr tablet    MUCINEX     Take 1 tablet (600 mg) by mouth 2 times daily        insulin isophane human 100 UNIT/ML injection    HumuLIN N PEN    15 mL    Inject 35 Units Subcutaneous 2 times daily     Type 2 diabetes mellitus with stage 3 chronic kidney disease, with long-term current use of insulin (H)       insulin syringes (disposable) U-100 1 ML Misc     100 each    1 each 5 times daily    Type 2 diabetes mellitus with chronic kidney disease, without long-term current use of insulin, unspecified CKD stage (H)       ketamine (KETALAR) 5%-gabapentin (NEURONTIN) 8% 5%-8% Gel topical PLO cream     30 g    Apply 30 g topically 3 times daily    Mononeuritis       levothyroxine 150 MCG tablet    SYNTHROID/LEVOTHROID    30 tablet    Take 1 tablet (150 mcg) by mouth daily    Other specified hypothyroidism       Simplicita SoftwareCAN FINEPOINT LANCETS Misc     100 each    Use to test blood sugars 2 times daily or as directed.    Type 2 diabetes mellitus with diabetic polyneuropathy, without long-term current use of insulin (H)       losartan 25 MG tablet    COZAAR    30 tablet    Take 1 tablet (25 mg) by mouth daily    Chronic diastolic heart failure (H)       Metoprolol Tartrate 75 MG Tabs     180 tablet    Take 75 mg by mouth 2 times daily    Atherosclerosis of native coronary artery without angina pectoris, unspecified whether native or transplanted heart       niacin 500 MG CR tablet    NIASPAN     Take 500 mg by mouth daily        nitroGLYcerin 0.4 MG sublingual tablet    NITROSTAT    25 tablet    For chest pain place 1 tablet under the tongue every 5 minutes for 3 doses. If symptoms persist 5 minutes after 1st dose call 911.    Atherosclerosis of native coronary artery without angina pectoris, unspecified whether native or transplanted heart       ONE TOUCH ULTRA (DEVICES) Misc     1    test blood sugar BID    Type II or unspecified type diabetes mellitus without mention of complication, not stated as uncontrolled       polyethylene glycol Packet    MIRALAX/GLYCOLAX    7 packet    Take 17 g by mouth daily    Atherosclerosis of native coronary artery without angina pectoris, unspecified whether native or transplanted heart        potassium chloride SA 10 MEQ CR tablet    K-DUR/KLOR-CON M    360 tablet    Take 4 tablets (40 mEq) by mouth 3 times daily    Chronic diastolic heart failure (H)       pregabalin 100 MG capsule    LYRICA    90 capsule    Take 1 capsule (100 mg) by mouth 2 times daily    Pain in joint of left shoulder       repaglinide 1 MG tablet    PRANDIN    90 tablet    Take 1 tablet (1 mg) by mouth 3 times daily (before meals)    Type 2 diabetes mellitus with diabetic chronic kidney disease, unspecified CKD stage, unspecified long term insulin use status (H)       senna-docusate 8.6-50 MG per tablet    SENOKOT-S;PERICOLACE    30 tablet    Take 1-2 tablets by mouth 2 times daily as needed for constipation    Atherosclerosis of native coronary artery without angina pectoris, unspecified whether native or transplanted heart       TIZANIDINE HCL PO      Take 4 mg by mouth At Bedtime        traZODone 50 MG tablet    DESYREL    270 tablet    Take 3 tablets (150 mg) by mouth At Bedtime    Recurrent major depressive disorder, in partial remission (H)       VITAMIN D (CHOLECALCIFEROL) PO      Take 10,000 Units by mouth daily

## 2018-09-21 ENCOUNTER — TELEPHONE (OUTPATIENT)
Dept: FAMILY MEDICINE | Facility: CLINIC | Age: 73
End: 2018-09-21

## 2018-09-21 ENCOUNTER — DOCUMENTATION ONLY (OUTPATIENT)
Dept: CARE COORDINATION | Facility: CLINIC | Age: 73
End: 2018-09-21

## 2018-09-21 ENCOUNTER — CARE COORDINATION (OUTPATIENT)
Dept: CARDIOLOGY | Facility: CLINIC | Age: 73
End: 2018-09-21

## 2018-09-21 NOTE — TELEPHONE ENCOUNTER
Reason for Call: Request for an order or referral:    Order or referral being requested: Homecare orders    Date needed: ASAP    Has the patient been seen by the PCP for this problem? YES    Additional comments: Skilled Nursing x1 for 8 weeks, 5 as needed,and Home Health Aide x1 for 8 weeks. Please Advise thank you    Phone number Patient can be reached at:  Other phone number:  [phone]    Best Time:  anytime    Can we leave a detailed message on this number?  YES    Call taken on 9/21/2018 at 1:41 PM by Chloe Ospina

## 2018-09-21 NOTE — PROGRESS NOTES
Called patient to review Zio Patch results. Per review by Cathi Vaughn NP, patient had some fast heart rates but overall rhythm remains normal. This information given to patient. Given these results, it is recommended that patient follow up with Neurology in relation to her syncope/dizziness. Referral previously placed. Patient asked for assistance scheduling this appointment. Neurology appointment scheduled for 9/26/18 at 1030am. Information given to Mariel who is agreeable and states no further questions at this time.

## 2018-09-22 NOTE — PROGRESS NOTES
09/20/18 1100   General Information   Type Of Visit Initial   Start Of Care Date 09/20/18   Referring Physician Dr. Rafaela William   Orders Evaluate And Treat   Orders Comment Video Swallow study   Medical Diagnosis Dysphagia, aspiration into airway   Onset Of Illness/injury Or Date Of Surgery 08/27/18   Pertinent History of Current Problem/OT: Additional Occupational Profile Info Ms. Ribeiro is a 73-year-old woman who has a past medical history significant for CAD, chronic diastolic HF, diabetes mellitus type 2 with neuropathy, urinary tract infections, Enterobacter cloacae complex and E. coli, COPD, hypertension, CKD stage III, left shoulder pain and normocytic anemia hyperlipidemia, depression/anxiety, fibromyalgia, lymphedema of bilateral lower extremities, obstructive sleep apnea with the use of CPAP, hypothyroidism and gout.  She is nowstatus post CABG ×3 on 7/2/18.  She is currently participating in speech pathology services via home care.  She underwent speech therapy services while in the hospital after her CABG as well as in acute rehab.  She is being referred for video swallow study to more definitively evaluate her pharyngeal and esophageal swallow phases.   Respiratory Status Room air   Prior Level Of Function Swallowing   Prior Level Of Function Comment regular solids and thin liquids   Living Environment [place]  (With a friend)   General Observations Pt pleasant and cooperative during session   Patient/family Goals Pt would like to continue to eat/drink.    Clinical Swallow Evaluation   Oral Musculature generally intact   Structural Abnormalities none present   Dentition upper and lower dentures   Mucosal Quality good   Mandibular Strength and Mobility intact   Oral Labial Strength and Mobility WFL   Lingual Strength and Mobility WFL   Velar Elevation intact   Buccal Strength and Mobility intact   Laryngeal Function Cough;Throat clear;Swallow;Voicing initiated   VFSS Eval: Radiology    Radiologist Resident   Views Taken left lateral;A/P   Physical Location of Procedure [facility]   VFSS Eval: Thin Liquid Texture Trial   Mode of Presentation, Thin Liquid cup;self-fed;straw   Order of Presentation 1,2,3,4,10   Preparatory Phase Poor bolus control   Oral Phase, Thin Liquid Premature pharyngeal entry   Pharyngeal Phase, Thin Liquid Delayed swallow reflex   Rosenbek's Penetration Aspiration Scale: Thin Liquid Trial Results 7 - contrast passes glottis, visible subglottic residue remains despite patient's response (aspiration)   Diagnostic Statement Deep penetration noted consistently on thin liquid trials with small amount of aspiration noted on consecutive sips of thin liquid via straw. Discoordination noted on oral phase with trouble initiating the swallow.    VFSS Eval: Nectar Thick Liquid Texture Trial   Mode of Presentation, Nectar cup;self-fed   Order of Presentation 5,6   Preparatory Phase WFL   Oral Phase, [place] other (see comments)  (Oral discoordination demonstrated)   Pharyngeal Phase, Nectar Delayed swallow reflex   Rosenbek's Penetration Aspiration Scale: Nectar-Thick Liquid Trial Results 1 - no aspiration, contrast does not enter airway   Diagnostic Statement No aspiration/penetration noted on nectar thickened liquid trials.    VFSS Eval: Puree Solid Texture Trial   Mode of Presentation, Puree self-fed;spoon   Order of Presentation 7   Preparatory Phase WFL   Oral Phase, Puree WFL   Pharyngeal Phase, Puree Delayed swallow reflex   Rosenbek's Penetration Aspiration Scale: Puree Food Trial Results 1 - no aspiration, contrast does not enter airway   Diagnostic Statement No aspiration/penetration noted on puree consistency trial. Delayed initiation of the swallow to the valleculae. No pharyngeal residue noted after the swallow.    VFSS Eval: Solid Food Texture Trial   Mode of Presentation, Solid self-fed   Order of Presentation 8   Preparatory Phase WFL   Oral Phase, Solid WFL   Pharyngeal  Phase, Solid Delayed swallow reflex   Rosenbek's Penetration Aspiration Scale: Solid Food Trial Results 1 - no aspiration, contrast does not enter airway   Diagnostic Statement No aspiration/penetration noted on solid consistency.  Slightly delayed swallow with no pharyngeal residule following swallow.    Educational Assessment   Barriers to Learning Cognitive   Esophageal Phase of Swallow   Patient reports or presents with symptoms of esophageal dysphagia Yes   Esophageal sweep performed during today s vidofluoroscopic exam  Yes;Please refer to radiologist's report for details   Swallow Eval: Clinical Impressions   Skilled Criteria for Therapy Intervention Skilled criteria met.  Treatment indicated.   Functional Assessment Scale (FAS) 5   Dysphagia Outcome Severity Scale (EBENEZER) Level 5 - EBENEZER   Treatment Diagnosis Mild oropharyngeal dysphagia   Diet texture recommendations Regular diet;Nectar thick liquids  (Free water protocol)   Recommended Feeding/Eating Techniques alternate between small bites and sips of food/liquid;maintain upright posture during/after eating for 30 mins;hard swallow w/ each bite or sip;small sips/bites;no straws   Predicted Duration of Therapy Intervention (days/wks) Evaluation only at this location. Pt participating in home health SLP services   Risks and Benefits of Treatment have been explained. Yes   Patient, family and/or staff in agreement with Plan of Care Yes   Clinical Impression Comments Ms. Ribeiro demonstrates mild oropharyngeal dysphagia as characterized by deep penetration on thin liquid trials consistently into laryngeal vestibule which progresses to trace aspiration on large consecutive isips of thin liquid. Pt unable to consistently follow directions for safe swallow strategies. She demonstrates decreased laryngeal sensation as no throat clear or cough demonstrated on penetration or aspiration events. Delayed initiation of the swallow noted on thin liquid with discoordiated  oral phase. Pt demonstrates more timely swallow on nectar thickened liquid, puree and solid conssitencies. Pt took 13 mm barium table with applesauce without difficulty. It was difficult to get Ms Ribeiro to take large consecutive sips of barium in the same manner she sips on liquids of her choosing so this aspect is somewhat limited.  Recommend Regular consistency solids and nectar thickened liquids. Free water between meals following good oral cares. Sit upright for all po intake. Small bites/sips and slow rate. No straws. Pt to continue speech pathology services with home care.    Total Session Time   Total Session Time 45   Total Evaluation Time 45   Swallowing   Swallowing:  Current Status , Goal , Discharge -Svau Only-Modifier the same for all G-codes CK: 40-59% impairment   Swallowing: Current  & Discharge Modifier Rationale-Eval Only Modifier chosen based on the results of this evaluation and reference to TIP guidelines     Thank you for the referral of Mariel Ribeiro. If you have any questions about this report, please contact me using the information below.      Opal Carvajal MS, CCC-SLP  Speech-Language Pathology  [facility]  Departement of Otolaryngology/D&T - 4th floor  Pager: [phone]  Phone: [phone]  Email: [email or web address]

## 2018-09-24 ENCOUNTER — PATIENT OUTREACH (OUTPATIENT)
Dept: CARE COORDINATION | Facility: CLINIC | Age: 73
End: 2018-09-24

## 2018-09-24 ENCOUNTER — TELEPHONE (OUTPATIENT)
Dept: FAMILY MEDICINE | Facility: CLINIC | Age: 73
End: 2018-09-24

## 2018-09-24 NOTE — TELEPHONE ENCOUNTER
Calling to get verbal order to continue speech therapy after swallow study:  4 more visits.  Please call.  OK to leave message on voicemail

## 2018-09-25 NOTE — TELEPHONE ENCOUNTER
FUTURE VISIT INFORMATION      FUTURE VISIT INFORMATION:    Date: 09/26/2018     Time: 10;30 am     Location: [facility]   REFERRAL INFORMATION:    Referring provider:  INO JACOB    Referring providers clinic:      NOTES (FOR ALL VISITS) STATUS DETAILS   OFFICE NOTE from referring provider Care Everywhere 08/28/2018   OFFICE NOTE from other specialist N/A    DISCHARGE SUMMARY from hospital Internal 09/09/2018, 08/15/2018, 07/13/2018, 06/29/2018, 06/26/2018, 11/07/2017, 08/07/2017, 05/17/2017, 11/01/2016, 09/19/2016, 09/09/2016, 12/19/2015,    DISCHARGE REPORT from the ER Internal 10/04/2017, 11/18/2016, 11/01/2016,    OPERATIVE REPORT Internal 07/02/2018, 08/11/2017, 11/01/2016, 09/06/2016, 01/12/2015,    MEDICATION LIST Internal    IMAGING  (FOR ALL VISITS)     EMG Internal 09/24/2018, 09/21/2018, 09/18/2018, 09/14/2018,    EEG N/A    ECT N/A    MRI (HEAD, NECK, SPINE) Internal  PACS  09/09/2018, 10/04/2017, 12/120/2016, 05/04/2015, 05/16/2011,    CT (HEAD, NECK, SPINE) Internal  PACS  06/26/2018, 10/04/2017, 11/18/2016, 11/11/2016, 12/19/2015, 11/08/2011, 12/28/2010,    OTHER     XR Lumbar Spine 2/3 Views 07/07/2015 Pac[facility] Internal

## 2018-09-26 ENCOUNTER — DOCUMENTATION ONLY (OUTPATIENT)
Dept: CARE COORDINATION | Facility: CLINIC | Age: 73
End: 2018-09-26

## 2018-09-26 ENCOUNTER — PRE VISIT (OUTPATIENT)
Dept: NEUROLOGY | Facility: CLINIC | Age: 73
End: 2018-09-26

## 2018-09-26 ENCOUNTER — OFFICE VISIT (OUTPATIENT)
Dept: SLEEP MEDICINE | Facility: CLINIC | Age: 73
End: 2018-09-26
Attending: NURSE PRACTITIONER
Payer: MEDICARE

## 2018-09-26 ENCOUNTER — OFFICE VISIT (OUTPATIENT)
Dept: NEUROLOGY | Facility: CLINIC | Age: 73
End: 2018-09-26
Payer: MEDICARE

## 2018-09-26 VITALS
RESPIRATION RATE: 18 BRPM | DIASTOLIC BLOOD PRESSURE: 56 MMHG | BODY MASS INDEX: 47.09 KG/M2 | HEIGHT: 66 IN | WEIGHT: 293 LBS | SYSTOLIC BLOOD PRESSURE: 109 MMHG | HEART RATE: 71 BPM | TEMPERATURE: 97.8 F | OXYGEN SATURATION: 95 %

## 2018-09-26 VITALS
HEIGHT: 66 IN | BODY MASS INDEX: 47.09 KG/M2 | DIASTOLIC BLOOD PRESSURE: 55 MMHG | RESPIRATION RATE: 18 BRPM | WEIGHT: 293 LBS | HEART RATE: 61 BPM | OXYGEN SATURATION: 98 % | SYSTOLIC BLOOD PRESSURE: 117 MMHG

## 2018-09-26 DIAGNOSIS — R25.1 SPELLS OF TREMBLING: ICD-10-CM

## 2018-09-26 DIAGNOSIS — Z95.1 S/P CABG X 3: ICD-10-CM

## 2018-09-26 DIAGNOSIS — R13.10 SWALLOWING PROBLEM: ICD-10-CM

## 2018-09-26 DIAGNOSIS — R55 SYNCOPE, UNSPECIFIED SYNCOPE TYPE: Primary | ICD-10-CM

## 2018-09-26 DIAGNOSIS — G47.33 OSA (OBSTRUCTIVE SLEEP APNEA): Primary | ICD-10-CM

## 2018-09-26 DIAGNOSIS — R20.9 DISTURBANCE OF SKIN SENSATION: ICD-10-CM

## 2018-09-26 DIAGNOSIS — Z86.39 H/O DIABETES MELLITUS: ICD-10-CM

## 2018-09-26 DIAGNOSIS — I50.32 CHRONIC DIASTOLIC HEART FAILURE (H): Chronic | ICD-10-CM

## 2018-09-26 PROCEDURE — 99205 OFFICE O/P NEW HI 60 MIN: CPT | Performed by: INTERNAL MEDICINE

## 2018-09-26 ASSESSMENT — PAIN SCALES - GENERAL: PAINLEVEL: SEVERE PAIN (7)

## 2018-09-26 NOTE — PATIENT INSTRUCTIONS
Plan:  EMG for neuropathy  Brain MRI for any stroke or other etiology of patient   EEG 3 hour video  Follow up after the test    Talk to your PCP about low blood pressure and need to review your medications.

## 2018-09-26 NOTE — MR AVS SNAPSHOT
After Visit Summary   9/26/2018    Mariel Ribeiro    MRN: [id number]           Patient Information     Date Of Birth          1945        Visit Information        Provider Department      9/26/2018 8:00 AM Noah Girard MD [facility]        Today's Diagnoses     BELEN (obstructive sleep apnea)    -  1    Chronic diastolic heart failure (H)          Care Instructions      Your BMI is Body mass index is 48.26 kg/(m^2).  Weight management is a personal decision.  If you are interested in exploring weight loss strategies, the following discussion covers the approaches that may be successful. Body mass index (BMI) is one way to tell whether you are at a healthy weight, overweight, or obese. It measures your weight in relation to your height.  A BMI of 18.5 to 24.9 is in the healthy range. A person with a BMI of 25 to 29.9 is considered overweight, and someone with a BMI of 30 or greater is considered obese. More than two-thirds of American adults are considered overweight or obese.  Being overweight or obese increases the risk for further weight gain. Excess weight may lead to heart disease and diabetes.  Creating and following plans for healthy eating and physical activity may help you improve your health.  Weight control is part of healthy lifestyle and includes exercise, emotional health, and healthy eating habits. Careful eating habits lifelong are the mainstay of weight control. Though there are significant health benefits from weight loss, long-term weight loss with diet alone may be very difficult to achieve- studies show long-term success with dietary management in less than 10% of people. Attaining a healthy weight may be especially difficult to achieve in those with severe obesity. In some cases, medications, devices and surgical management might be considered.  What can you do?  If you are overweight or obese and are interested in methods for  weight loss, you should discuss this with your provider.     Consider reducing daily calorie intake by 500 calories.     Keep a food journal.     Avoiding skipping meals, consider cutting portions instead.    Diet combined with exercise helps maintain muscle while optimizing fat loss. Strength training is particularly important for building and maintaining muscle mass. Exercise helps reduce stress, increase energy, and improves fitness. Increasing exercise without diet control, however, may not burn enough calories to loose weight.       Start walking three days a week 10-20 minutes at a time    Work towards walking thirty minutes five days a week     Eventually, increase the speed of your walking for 1-2 minutes at time    In addition, we recommend that you review healthy lifestyles and methods for weight loss available through the National Institutes of Health patient information sites:  http://win.niddk.nih.gov/publications/index.htm    And look into health and wellness programs that may be available through your health insurance provider, employer, local community center, or hans club.    Weight management plan: Patient was referred to their PCP to discuss a diet and exercise plan.              Follow-ups after your visit        Follow-up notes from your care team     Return in about 1 week (around 10/3/2018).      Your next 10 appointments already scheduled     Sep 26, 2018 10:30 AM CDT   (Arrive by 10:15 AM)   New Patient Visit with CHANTAL Soriano CNP   [facility] Neurology ([facility])    [address] [place] [id number] [phone]            Oct 09, 2018  6:00 PM CDT   Lab with  LAB   [facility] Lab ([facility])    [address] [place] [id number]   [phone]            Oct 09, 2018  6:30 PM CDT   (Arrive by 6:15 PM)   CORE RETURN with Starla Saez NP   [facility]  ([facility])    [address]  [address]  [facility] [address]   [phone]            Oct 18, 2018  1:30 PM CDT   (Arrive by 1:15 PM)   RETURN DIABETES with Odalis Medley PA-C   [facility] Endocrinology ([facility])    [address]  [address] [facility] [address]   [phone]            Oct 30, 2018  8:00 PM CDT   Psg Split W/Tcm with SLEEP STUDY RM 1   [facility] ([facility])    [address] [place] [id number] [phone]            Nov 13, 2018  9:20 AM CST   Return Sleep Patient with Noah Girard MD   [facility] ([facility])    [address] [place] [id number] [phone]            Jan 14, 2019  2:30 PM CST   (Arrive by 2:15 PM)   RETURN DIABETES with Keven Jaeger MD   [facility] Endocrinology ([facility])    [address]  [address] [facility] [address] [phone]              Future tests that were ordered for you today     Open Future Orders        Priority Expected Expires Ordered    Comprehensive Sleep Study Routine  3/25/2019 9/26/2018    Blood gas venous Routine  9/26/2019 9/26/2018            Who to contact     If you have questions or need follow up information about today's clinic visit or your schedule please contact [facility] directly at [phone].  Normal or non-critical lab and imaging results will be communicated to you by MyChart, letter or phone within 4 business days after the clinic has received the results. If you do not hear from us within 7 days, please contact the clinic through MyChart or phone. If you have a critical or abnormal lab result, we will notify you by phone as soon as possible.  Submit refill requests through [facility] or call  "your pharmacy and they will forward the refill request to us. Please allow 3 business days for your refill to be completed.          Additional Information About Your Visit        [facility] Information     [facility] gives you secure access to your electronic health record. If you see a primary care provider, you can also send messages to your care team and make appointments. If you have questions, please call your primary care clinic.  If you do not have a primary care provider, please call [phone] and they will assist you.        Care EveryWhere ID     This is your Care EveryWhere ID. This could be used by other organizations to access your [facility] medical records  [id number]        Your Vitals Were     Pulse Respirations Height Pulse Oximetry BMI (Body Mass Index)       61 18 1.676 m (5' 6\") 98% 48.26 kg/m2        Blood Pressure from Last 3 Encounters:   09/26/18 117/55   09/12/18 121/75   09/10/18 127/54    Weight from Last 3 Encounters:   09/26/18 135.6 kg (299 lb)   09/12/18 130.6 kg (288 lb)   09/10/18 132.5 kg (292 lb)              We Performed the Following     SLEEP EVALUATION & MANAGEMENT REFERRAL - ADULT -[facility]  [phone] (Age 2 and up)          Today's Medication Changes          These changes are accurate as of 9/26/18  9:23 AM.  If you have any questions, ask your nurse or doctor.               These medicines have changed or have updated prescriptions.        Dose/Directions    acetaminophen 325 MG tablet   Commonly known as:  TYLENOL   This may have changed:  when to take this   Used for:  S/P CABG x 3, Acute post-operative pain        Dose:  650 mg   Take 2 tablets (650 mg) by mouth every 4 hours as needed for mild pain   Quantity:  100 tablet   Refills:  0       LIFESCAN FINEPOINT LANCETS Misc   This may have changed:  additional instructions   Used for:  Type 2 diabetes mellitus with diabetic polyneuropathy, without long-term current " use of insulin (H)        Use to test blood sugars 2 times daily or as directed.   Quantity:  100 each   Refills:  prn       ONE TOUCH ULTRA (DEVICES) Misc   This may have changed:  See the new instructions.   Used for:  Type II or unspecified type diabetes mellitus without mention of complication, not stated as uncontrolled        test blood sugar BID   Quantity:  1   Refills:  0       senna-docusate 8.6-50 MG per tablet   Commonly known as:  SENOKOT-S;PERICOLACE   This may have changed:  when to take this   Used for:  Atherosclerosis of native coronary artery without angina pectoris, unspecified whether native or transplanted heart        Dose:  1-2 tablet   Take 1-2 tablets by mouth 2 times daily as needed for constipation   Quantity:  30 tablet   Refills:  0                Primary Care Provider Office Phone # Fax #    Rafaela William [phone] [address] [place] [address]        Goals        General    Pain Management (pt-stated)     Notes - Note created  9/12/2018 11:30 AM by Senia Durán, JODY    Goal Statement: I will decrease back pain   Measure of Success: Increased mobility with daily activity   Supportive Steps to Achieve:   I will start taking Tylenol 100 mg 3 times a day per Dr William   I will use heat ice and rest   Barriers: Deconditioned   Strengths: Continues home care services   Date to Achieve By: to be determined   Patient expressed understanding of goal: yes          Equal Access to Services     KJ SALVADOR AH: Marcelina santillan hadpapio Soomaali, waaxda luqadaha, qaybta kaalmada adeegyada, gopal bird. So [facility] [phone].    ATENCIÓN: Si habla español, tiene a gillespie disposición servicios gratuitos de asistencia lingüística. Llame al [phone].    We comply with applicable federal civil rights laws and [place] laws. We do not discriminate on the basis of race, color, national origin, age, disability, sex, sexual orientation,  or gender identity.            Thank you!     Thank you for choosing [facility]  for your care. Our goal is always to provide you with excellent care. Hearing back from our patients is one way we can continue to improve our services. Please take a few minutes to complete the written survey that you may receive in the mail after your visit with us. Thank you!             Your Updated Medication List - Protect others around you: Learn how to safely use, store and throw away your medicines at www.disposemymeds.org.          This list is accurate as of 9/26/18  9:23 AM.  Always use your most recent med list.                   Brand Name Dispense Instructions for use Diagnosis    acetaminophen 325 MG tablet    TYLENOL    100 tablet    Take 2 tablets (650 mg) by mouth every 4 hours as needed for mild pain    S/P CABG x 3, Acute post-operative pain       albuterol 108 (90 Base) MCG/ACT inhaler    PROAIR HFA    1 Inhaler    Inhale 1-2 puffs into the lungs every 4 hours as needed for wheezing    Chronic obstructive pulmonary disease, unspecified COPD type (H)       aspirin 325 MG EC tablet     30 tablet    Take 1 tablet by mouth daily.        atorvastatin 40 MG tablet    LIPITOR    30 tablet    Take 1 tablet (40 mg) by mouth daily    Atherosclerosis of native coronary artery without angina pectoris, unspecified whether native or transplanted heart       B-D U/F 31G X 5 MM   Generic drug:  insulin pen needle     100 each    USE FOR INSULIN INJECTION    Type 2 diabetes mellitus with diabetic polyneuropathy, without long-term current use of insulin (H)       blood glucose monitoring test strip    no brand specified    200 each    1 strip by In Vitro route 2 times daily Strips per patient's preference    Type 2 diabetes mellitus with diabetic polyneuropathy, without long-term current use of insulin (H)       bumetanide 2 MG tablet    BUMEX    60 tablet    Take 1 tablet (2 mg) by mouth 2 times daily     Chronic diastolic heart failure (H)       clotrimazole 1 % cream    LOTRIMIN    60 g    Apply topically 2 times daily    Dermatitis       EPINEPHrine 0.3 MG/0.3ML injection 2-pack    EPIPEN/ADRENACLICK/or ANY BX GENERIC EQUIV    0.6 mL    Inject 0.3 mLs (0.3 mg) into the muscle once as needed for anaphylaxis    Allergic reaction, subsequent encounter       escitalopram 20 MG tablet    LEXAPRO    30 tablet    Take 1 tablet (20 mg) by mouth every morning    Recurrent major depressive disorder, in partial remission (H)       febuxostat 40 MG Tabs tablet    ULORIC     Take 40 mg by mouth every evening        ferrous gluconate 324 (38 Fe) MG tablet    FERGON    36 tablet    Take 1 tablet (324 mg) by mouth Every Mon, Wed, Fri Morning    Chronic diastolic heart failure (H), Iron deficiency anemia secondary to inadequate dietary iron intake       FOLIC ACID PO      Take 1 mg by mouth daily        guaiFENesin 600 MG 12 hr tablet    MUCINEX     Take 1 tablet (600 mg) by mouth 2 times daily        insulin isophane human 100 UNIT/ML injection    HumuLIN N PEN    15 mL    Inject 35 Units Subcutaneous 2 times daily    Type 2 diabetes mellitus with stage 3 chronic kidney disease, with long-term current use of insulin (H)       insulin syringes (disposable) U-100 1 ML Misc     100 each    1 each 5 times daily    Type 2 diabetes mellitus with chronic kidney disease, without long-term current use of insulin, unspecified CKD stage (H)       levothyroxine 150 MCG tablet    SYNTHROID/LEVOTHROID    30 tablet    Take 1 tablet (150 mcg) by mouth daily    Other specified hypothyroidism       ThreadboxCAN FINEPOINT LANCETS Misc     100 each    Use to test blood sugars 2 times daily or as directed.    Type 2 diabetes mellitus with diabetic polyneuropathy, without long-term current use of insulin (H)       losartan 25 MG tablet    COZAAR    30 tablet    Take 1 tablet (25 mg) by mouth daily    Chronic diastolic heart failure (H)       Metoprolol  Tartrate 75 MG Tabs     180 tablet    Take 75 mg by mouth 2 times daily    Atherosclerosis of native coronary artery without angina pectoris, unspecified whether native or transplanted heart       niacin 500 MG CR tablet    NIASPAN     Take 500 mg by mouth daily        nitroGLYcerin 0.4 MG sublingual tablet    NITROSTAT    25 tablet    For chest pain place 1 tablet under the tongue every 5 minutes for 3 doses. If symptoms persist 5 minutes after 1st dose call 911.    Atherosclerosis of native coronary artery without angina pectoris, unspecified whether native or transplanted heart       ONE TOUCH ULTRA (DEVICES) Misc     1    test blood sugar BID    Type II or unspecified type diabetes mellitus without mention of complication, not stated as uncontrolled       potassium chloride SA 10 MEQ CR tablet    K-DUR/KLOR-CON M    360 tablet    Take 4 tablets (40 mEq) by mouth 3 times daily    Chronic diastolic heart failure (H)       pregabalin 100 MG capsule    LYRICA    90 capsule    Take 1 capsule (100 mg) by mouth 2 times daily    Pain in joint of left shoulder       repaglinide 1 MG tablet    PRANDIN    90 tablet    Take 1 tablet (1 mg) by mouth 3 times daily (before meals)    Type 2 diabetes mellitus with diabetic chronic kidney disease, unspecified CKD stage, unspecified long term insulin use status (H)       senna-docusate 8.6-50 MG per tablet    SENOKOT-S;PERICOLACE    30 tablet    Take 1-2 tablets by mouth 2 times daily as needed for constipation    Atherosclerosis of native coronary artery without angina pectoris, unspecified whether native or transplanted heart       TIZANIDINE HCL PO      Take 4 mg by mouth At Bedtime        traZODone 50 MG tablet    DESYREL    270 tablet    Take 3 tablets (150 mg) by mouth At Bedtime    Recurrent major depressive disorder, in partial remission (H)       VITAMIN D (CHOLECALCIFEROL) PO      Take 10,000 Units by mouth daily

## 2018-09-26 NOTE — NURSING NOTE
Chief Complaint   Patient presents with     RECHECK     P NEW PATIENT CONSULATION VISIT        Preventive Care:    Colorectal Cancer Screening: During our visit today, we discussed that it is recommended you receive colorectal cancer screening. Please call or make an appointment with your primary care provider to discuss this. You may also call the [facility] scheduling line ([phone]) to set up a colonoscopy appointment.      PATIENT HAD AN APPOINTMENT TODAY PRIOR AND HAD MEDICATIONS RECONCILED.       Yeimy Dawson MA

## 2018-09-26 NOTE — PROGRESS NOTES
[facility] now requests orders and shares plan of care/discharge summaries for some patients through [employer].  Please REPLY TO THIS MESSAGE OR ROUTE BACK TO THE AUTHOR in order to give authorization for orders when needed.  This is considered a verbal order, you will still receive a faxed copy of orders for signature.  Thank you for your assistance in improving collaboration for our patients.    ORDER  Continue skilled OT lymphedema therapy treatment to for thigh edema reduction, assess compression garments, HEP, and establish home edema management program 2w2, 1m1 visits.     Rosina Angulo, JOE/DOMINIC, CLT  [phone]  [email or web address]
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
## 2018-09-26 NOTE — PATIENT INSTRUCTIONS
Your BMI is Body mass index is 48.26 kg/(m^2).  Weight management is a personal decision.  If you are interested in exploring weight loss strategies, the following discussion covers the approaches that may be successful. Body mass index (BMI) is one way to tell whether you are at a healthy weight, overweight, or obese. It measures your weight in relation to your height.  A BMI of 18.5 to 24.9 is in the healthy range. A person with a BMI of 25 to 29.9 is considered overweight, and someone with a BMI of 30 or greater is considered obese. More than two-thirds of American adults are considered overweight or obese.  Being overweight or obese increases the risk for further weight gain. Excess weight may lead to heart disease and diabetes.  Creating and following plans for healthy eating and physical activity may help you improve your health.  Weight control is part of healthy lifestyle and includes exercise, emotional health, and healthy eating habits. Careful eating habits lifelong are the mainstay of weight control. Though there are significant health benefits from weight loss, long-term weight loss with diet alone may be very difficult to achieve- studies show long-term success with dietary management in less than 10% of people. Attaining a healthy weight may be especially difficult to achieve in those with severe obesity. In some cases, medications, devices and surgical management might be considered.  What can you do?  If you are overweight or obese and are interested in methods for weight loss, you should discuss this with your provider.     Consider reducing daily calorie intake by 500 calories.     Keep a food journal.     Avoiding skipping meals, consider cutting portions instead.    Diet combined with exercise helps maintain muscle while optimizing fat loss. Strength training is particularly important for building and maintaining muscle mass. Exercise helps reduce stress, increase energy, and improves fitness.  Increasing exercise without diet control, however, may not burn enough calories to loose weight.       Start walking three days a week 10-20 minutes at a time    Work towards walking thirty minutes five days a week     Eventually, increase the speed of your walking for 1-2 minutes at time    In addition, we recommend that you review healthy lifestyles and methods for weight loss available through the National Institutes of Health patient information sites:  http://win.niddk.nih.gov/publications/index.htm    And look into health and wellness programs that may be available through your health insurance provider, employer, local community center, or h[employer] club.    Weight management plan: Patient was referred to their PCP to discuss a diet and exercise plan.

## 2018-09-26 NOTE — MR AVS SNAPSHOT
After Visit Summary   9/26/2018    Mariel Ribeiro    MRN: [id number]           Patient Information     Date Of Birth          1945        Visit Information        Provider Department      9/26/2018 10:30 AM Paula Hdz APRN [facility]        Today's Diagnoses     Syncope, unspecified syncope type    -  1    Spells of trembling        S/P CABG x 3        Swallowing problem        Disturbance of skin sensation        H/O diabetes mellitus          Care Instructions    Plan:  EMG for neuropathy  Brain MRI for any stroke or other etiology of patient   EEG 3 hour video  Follow up after the test    Talk to your PCP about low blood pressure and need to review your medications.           Follow-ups after your visit        Your next 10 appointments already scheduled     Oct 09, 2018  6:00 PM CDT   Lab with [facility]   [facility] ([facility])    [address] [place] [address]  1st Floor  [facility] [id number] [phone]            Oct 09, 2018  6:30 PM CDT   (Arrive by 6:15 PM)   CORE RETURN with Starla Saez NP   [facility] ([facility])    [address] [place] [address]  [address] [place] [id number] [phone]            Oct 16, 2018  4:00 PM CDT   (Arrive by 3:45 PM)   EMG with Hector Xiong MD   [facility] ([facility])    [address] [place] [address]  3rd Floor  [facility] [id number] [phone]           Do not use lotions or creams on the area to be tested. If you are on blood thinners (Warfarin, Coumadin, Lovenox, etc), please contact your primary care physician to check if it is safe to stop them 3 days prior to testing. If you have anxiety, please check with your referring physician to obtain anti-anxiety medication for the procedure.            Oct 16, 2018  6:15 PM CDT   MR BRAIN W/O CONTRAST with [id number]   [facility] ([facility])    [address] [address] [phone]           How do I prepare for my exam? (Food and drink instructions) **If you will be receiving sedation or general anesthesia, please see special notes below.**  How do I prepare for my exam? (Other instructions) Take your medicines as usual, unless your doctor tells you not to. Please remove any body piercings and hair extensions before you arrive. Follow your doctor s orders. If you do not, we may have to postpone your exam. You may or may not receive IV contrast for this exam pending the discretion of the Radiologist.  You do not need to do anything special to prepare. **If you will be receiving sedation or general anesthesia, please see special notes below.**  What should I wear:  The MRI machine uses a strong magnet. Please wear clothes without metal (snaps, zippers). A sweatsuit works well, or we may give you a hospital gown.  How long does the exam take: Most tests take 30 to 60 minutes.  HOWEVER, IF YOUR DOCTOR PRESCRIBES ANESTHESIA please plan on spending four to five hours in the recovery room.  What should I bring: Bring a list of your current medicines to your exam (including vitamins, minerals and over-the-counter drugs). Also bring the results of similar scans you may have had.  Do I need a : **If you will be receiving sedation or general anesthesia, please see special notes below.**  What should I do after the exam? No Restrictions, You may resume normal activities.  What is this test: MRI (magnetic resonance imaging) uses a strong magnet and radio waves to look inside the body. An MRA (magnetic resonance angiogram) does the same thing, but it lets us look at your blood vessels. A computer turns the radio waves into pictures showing cross sections of the body, much like slices of bread. This helps us see any problems more clearly.  Who should I call with questions: Please call the Imaging Department at  your exam site with any questions. Directions, parking instructions, and other information is available on our website, [email or web address].  How do I prepare if I m having sedation or anesthesia? **IMPORTANT** THE INSTRUCTIONS BELOW ARE ONLY FOR THOSE PATIENTS WHO HAVE BEEN TOLD THEY WILL RECEIVE SEDATION OR GENERAL ANESTHESIA DURING THEIR MRI PROCEDURE:  IF YOU WILL RECEIVE SEDATION (take medicine to help you relax during your exam): You must get the medicine from your doctor before you arrive. Bring the medicine to the exam. Do not take it at home. Arrive one hour early. Bring someone who can take you home after the test. Your medicine will make you sleepy. After the exam, you may not drive, take a bus or take a taxi by yourself. No eating 8 hours before your exam. You may have clear liquids up until 4 hours before your exam. (Clear liquids include water, clear tea, black coffee and fruit juice without pulp.)  IF YOU WILL RECEIVE ANESTHESIA (be asleep for your exam): Arrive 1 1/2 hours early. Bring someone who can take you home after the test. You may not drive, take a bus or take a taxi by yourself. No eating 8 hours before your exam. You may have clear liquids up until 4 hours before your exam. (Clear liquids include water, clear tea, black coffee and fruit juice without pulp.) You will spend four to five hours in the recovery room.            Oct 18, 2018  1:30 PM CDT   (Arrive by 1:15 PM)   RETURN DIABETES with BOBY [facility] ([facility])    [address] [id number] [phone]            Oct 30, 2018  8:00 PM CDT   Psg Split W/Tcm with SLEEP STUDY RM 1   [facility] ([facility])    [address] [place] [id number] [phone]            Nov 01, 2018 12:00 PM CDT   (Arrive by 11:45 AM)   In Lab Video Visit with  EEG TECH 2   EEG  [facility] [place] [place] [facility] ([facility])    [address] [facility]  3rd [facility] [address] [phone]            Nov 13, 2018  9:20 AM CST   Return Sleep Patient with Noah Girard MD   [facility] ([facility])    [address] [place] [phone]   [phone]            Nov 13, 2018 11:00 AM CST   (Arrive by 10:45 AM)   Return Visit with CHANTAL Soriano CNP   [facility] ([facility])    [address] [id number] [phone]            Jan 14, 2019  2:30 PM CST   (Arrive by 2:15 PM)   RETURN DIABETES with Keven Jaeger MD   [facility] ([facility])    [address] [place] [id number] [phone]              Future tests that were ordered for you today     Open Future Orders        Priority Expected Expires Ordered    EMG Routine  12/26/2018 9/26/2018    MRI Brain w/o contrast Routine  9/26/2019 9/26/2018    Comprehensive Sleep Study Routine  3/25/2019 9/26/2018    Blood gas venous Routine  9/26/2019 9/26/2018            Who to contact     Please call your clinic at [phone] to:    Ask questions about your health    Make or cancel appointments    Discuss your medicines    Learn about your test results    Speak to your doctor            Additional Information About Your Visit        [employer] Information     [email or web address] gives you secure access to your electronic health record. If you see a primary care provider, you can also send messages to your care team and make appointments. If you have questions, please call your primary care clinic.  If you do not have a primary care provider, please call [phone] and they will assist you.      [email or web address] is an electronic gateway that provides easy, online access to your medical  "records. With [employer], you can request a clinic appointment, read your test results, renew a prescription or communicate with your care team.     To access your existing account, please contact your [facility] or call [phone] for assistance.        Care EveryWhere ID     This is your Care EveryWhere ID. This could be used by other organizations to access your [facility] medical records  [id number]        Your Vitals Were     Pulse Temperature Respirations Height Pulse Oximetry Breastfeeding?    71 97.8  F (36.6  C) (Oral) 18 1.676 m (5' 6\") 95% No    BMI (Body Mass Index)                   48.26 kg/m2            Blood Pressure from Last 3 Encounters:   09/26/18 109/56   09/26/18 117/55   09/12/18 121/75    Weight from Last 3 Encounters:   09/26/18 135.6 kg (299 lb)   09/26/18 135.6 kg (299 lb)   09/12/18 130.6 kg (288 lb)              We Performed the Following     <12 hour EEG Monitoring/Video (33092 mod 52)          Today's Medication Changes          These changes are accurate as of 9/26/18  1:10 PM.  If you have any questions, ask your nurse or doctor.               These medicines have changed or have updated prescriptions.        Dose/Directions    acetaminophen 325 MG tablet   Commonly known as:  TYLENOL   This may have changed:  when to take this   Used for:  S/P CABG x 3, Acute post-operative pain        Dose:  650 mg   Take 2 tablets (650 mg) by mouth every 4 hours as needed for mild pain   Quantity:  100 tablet   Refills:  0       LIFESCAN FINEPOINT LANCETS Misc   This may have changed:  additional instructions   Used for:  Type 2 diabetes mellitus with diabetic polyneuropathy, without long-term current use of insulin (H)        Use to test blood sugars 2 times daily or as directed.   Quantity:  100 each   Refills:  prn       ONE TOUCH ULTRA (DEVICES) Misc   This may have changed:  See the new instructions.   Used for:  Type II or unspecified type diabetes mellitus " without mention of complication, not stated as uncontrolled        test blood sugar BID   Quantity:  1   Refills:  0       senna-docusate 8.6-50 MG per tablet   Commonly known as:  SENOKOT-S;PERICOLACE   This may have changed:  when to take this   Used for:  Atherosclerosis of native coronary artery without angina pectoris, unspecified whether native or transplanted heart        Dose:  1-2 tablet   Take 1-2 tablets by mouth 2 times daily as needed for constipation   Quantity:  30 tablet   Refills:  0                Primary Care Provider Office Phone # Fax #    Rafaela William [phone] [address]  [place] [address]        Goals        General    Pain Management (pt-stated)     Notes - Note created  9/12/2018 11:30 AM by Senia Durán RN    Goal Statement: I will decrease back pain   Measure of Success: Increased mobility with daily activity   Supportive Steps to Achieve:   I will start taking Tylenol 100 mg 3 times a day per Dr William   I will use heat ice and rest   Barriers: Deconditioned   Strengths: Continues home care services   Date to Achieve By: to be determined   Patient expressed understanding of goal: yes          Equal Access to Services     [facility]: [place] santillan hadeli Martin, waaxda lukelley, qaybta gopal schuster. So [facility] [phone].    ATENCIÓN: Si habla español, tiene a gillespie disposición servicios gratuitos de asistencia lingüística. Stephame al [phone].    We comply with applicable federal civil rights laws and [place] laws. We do not discriminate on the basis of race, color, national origin, age, disability, sex, sexual orientation, or gender identity.            Thank you!     Thank you for choosing [facility]  for your care. Our goal is always to provide you with excellent care. Hearing back from our patients is one way we can continue to improve our services. Please take a few  minutes to complete the written survey that you may receive in the mail after your visit with us. Thank you!             Your Updated Medication List - Protect others around you: Learn how to safely use, store and throw away your medicines at www.disposemymeds.org.          This list is accurate as of 9/26/18  1:10 PM.  Always use your most recent med list.                   Brand Name Dispense Instructions for use Diagnosis    acetaminophen 325 MG tablet    TYLENOL    100 tablet    Take 2 tablets (650 mg) by mouth every 4 hours as needed for mild pain    S/P CABG x 3, Acute post-operative pain       albuterol 108 (90 Base) MCG/ACT inhaler    PROAIR HFA    1 Inhaler    Inhale 1-2 puffs into the lungs every 4 hours as needed for wheezing    Chronic obstructive pulmonary disease, unspecified COPD type (H)       aspirin 325 MG EC tablet     30 tablet    Take 1 tablet by mouth daily.        atorvastatin 40 MG tablet    LIPITOR    30 tablet    Take 1 tablet (40 mg) by mouth daily    Atherosclerosis of native coronary artery without angina pectoris, unspecified whether native or transplanted heart       B-D U/F 31G X 5 MM   Generic drug:  insulin pen needle     100 each    USE FOR INSULIN INJECTION    Type 2 diabetes mellitus with diabetic polyneuropathy, without long-term current use of insulin (H)       blood glucose monitoring test strip    no brand specified    200 each    1 strip by In Vitro route 2 times daily Strips per patient's preference    Type 2 diabetes mellitus with diabetic polyneuropathy, without long-term current use of insulin (H)       bumetanide 2 MG tablet    BUMEX    60 tablet    Take 1 tablet (2 mg) by mouth 2 times daily    Chronic diastolic heart failure (H)       clotrimazole 1 % cream    LOTRIMIN    60 g    Apply topically 2 times daily    Dermatitis       EPINEPHrine 0.3 MG/0.3ML injection 2-pack    EPIPEN/ADRENACLICK/or ANY BX GENERIC EQUIV    0.6 mL    Inject 0.3 mLs (0.3 mg) into the muscle  once as needed for anaphylaxis    Allergic reaction, subsequent encounter       escitalopram 20 MG tablet    LEXAPRO    30 tablet    Take 1 tablet (20 mg) by mouth every morning    Recurrent major depressive disorder, in partial remission (H)       febuxostat 40 MG Tabs tablet    ULORIC     Take 40 mg by mouth every evening        ferrous gluconate 324 (38 Fe) MG tablet    FERGON    36 tablet    Take 1 tablet (324 mg) by mouth Every Mon, Wed, Fri Morning    Chronic diastolic heart failure (H), Iron deficiency anemia secondary to inadequate dietary iron intake       FOLIC ACID PO      Take 1 mg by mouth daily        guaiFENesin 600 MG 12 hr tablet    MUCINEX     Take 1 tablet (600 mg) by mouth 2 times daily        insulin isophane human 100 UNIT/ML injection    HumuLIN N PEN    15 mL    Inject 35 Units Subcutaneous 2 times daily    Type 2 diabetes mellitus with stage 3 chronic kidney disease, with long-term current use of insulin (H)       insulin syringes (disposable) U-100 1 ML Misc     100 each    1 each 5 times daily    Type 2 diabetes mellitus with chronic kidney disease, without long-term current use of insulin, unspecified CKD stage (H)       levothyroxine 150 MCG tablet    SYNTHROID/LEVOTHROID    30 tablet    Take 1 tablet (150 mcg) by mouth daily    Other specified hypothyroidism       LIFESCAN FINEPOINT LANCETS Misc     100 each    Use to test blood sugars 2 times daily or as directed.    Type 2 diabetes mellitus with diabetic polyneuropathy, without long-term current use of insulin (H)       losartan 25 MG tablet    COZAAR    30 tablet    Take 1 tablet (25 mg) by mouth daily    Chronic diastolic heart failure (H)       Metoprolol Tartrate 75 MG Tabs     180 tablet    Take 75 mg by mouth 2 times daily    Atherosclerosis of native coronary artery without angina pectoris, unspecified whether native or transplanted heart       niacin 500 MG CR tablet    NIASPAN     Take 500 mg by mouth daily         nitroGLYcerin 0.4 MG sublingual tablet    NITROSTAT    25 tablet    For chest pain place 1 tablet under the tongue every 5 minutes for 3 doses. If symptoms persist 5 minutes after 1st dose call 911.    Atherosclerosis of native coronary artery without angina pectoris, unspecified whether native or transplanted heart       ONE TOUCH ULTRA (DEVICES) Misc     1    test blood sugar BID    Type II or unspecified type diabetes mellitus without mention of complication, not stated as uncontrolled       potassium chloride SA 10 MEQ CR tablet    K-DUR/KLOR-CON M    360 tablet    Take 4 tablets (40 mEq) by mouth 3 times daily    Chronic diastolic heart failure (H)       pregabalin 100 MG capsule    LYRICA    90 capsule    Take 1 capsule (100 mg) by mouth 2 times daily    Pain in joint of left shoulder       repaglinide 1 MG tablet    PRANDIN    90 tablet    Take 1 tablet (1 mg) by mouth 3 times daily (before meals)    Type 2 diabetes mellitus with diabetic chronic kidney disease, unspecified CKD stage, unspecified long term insulin use status (H)       senna-docusate 8.6-50 MG per tablet    SENOKOT-S;PERICOLACE    30 tablet    Take 1-2 tablets by mouth 2 times daily as needed for constipation    Atherosclerosis of native coronary artery without angina pectoris, unspecified whether native or transplanted heart       TIZANIDINE HCL PO      Take 4 mg by mouth At Bedtime        traZODone 50 MG tablet    DESYREL    270 tablet    Take 3 tablets (150 mg) by mouth At Bedtime    Recurrent major depressive disorder, in partial remission (H)       VITAMIN D (CHOLECALCIFEROL) PO      Take 10,000 Units by mouth daily
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
## 2018-10-02 ENCOUNTER — HOSPITAL ENCOUNTER (EMERGENCY)
Facility: CLINIC | Age: 73
Discharge: HOME | End: 2018-10-02
Attending: FAMILY MEDICINE | Admitting: FAMILY MEDICINE
Payer: MEDICARE

## 2018-10-02 ENCOUNTER — TELEPHONE (OUTPATIENT)
Dept: FAMILY MEDICINE | Facility: CLINIC | Age: 73
End: 2018-10-02

## 2018-10-02 VITALS
RESPIRATION RATE: 18 BRPM | SYSTOLIC BLOOD PRESSURE: 141 MMHG | HEART RATE: 69 BPM | DIASTOLIC BLOOD PRESSURE: 53 MMHG | BODY MASS INDEX: 47.09 KG/M2 | HEIGHT: 66 IN | WEIGHT: 293 LBS | OXYGEN SATURATION: 99 % | TEMPERATURE: 97.6 F

## 2018-10-02 DIAGNOSIS — R53.1 EPISODE OF GENERALIZED WEAKNESS: ICD-10-CM

## 2018-10-02 PROCEDURE — 99282 EMERGENCY DEPT VISIT SF MDM: CPT | Mod: Z6 | Performed by: FAMILY MEDICINE

## 2018-10-02 PROCEDURE — 99281 EMR DPT VST MAYX REQ PHY/QHP: CPT | Performed by: FAMILY MEDICINE

## 2018-10-02 ASSESSMENT — ENCOUNTER SYMPTOMS
DIFFICULTY URINATING: 0
NUMBNESS: 0
HEADACHES: 0
FEVER: 0
ARTHRALGIAS: 0
COUGH: 0
ABDOMINAL PAIN: 0
CONFUSION: 0
COLOR CHANGE: 0
NECK STIFFNESS: 0
EYE REDNESS: 0
WEAKNESS: 1
SHORTNESS OF BREATH: 0

## 2018-10-02 NOTE — DISCHARGE INSTRUCTIONS
Home per your request.  You feel fine and request no further workup/lab testing in the ER.  Follow up with MD  Return if any concerns.  Make sure to stay well hydrated.

## 2018-10-02 NOTE — ED AVS SNAPSHOT
[facility], [place], Emergency Department    [address] [place] [id number]    Phone:  [phone]                                       Mariel Ribeiro   MRN: [id number]    Department:  [facility], Emergency Department   Date of Visit:  10/2/2018           After Visit Summary Signature Page     I have received my discharge instructions, and my questions have been answered. I have discussed any challenges I see with this plan with the nurse or doctor.    ..........................................................................................................................................  Patient/Patient Representative Signature      ..........................................................................................................................................  Patient Representative Print Name and Relationship to Patient    ..................................................               ................................................  Date                                   Time    ..........................................................................................................................................  Reviewed by Signature/Title    ...................................................              ..............................................  Date                                               Time          22EPIC Rev 08/18

## 2018-10-02 NOTE — ED TRIAGE NOTES
BIBA at the request of pt's home care nurse for generalized weakness. Pt states that she does not think this was necessary. She is painfree, afebrile, denies N/V/D, denies dizziness, denies urinary symptoms. Her SBP according to pt this morning was in the 80s but has been WNL or higher than normal since EMS arrival. She was 144/61 upon arrival. Hx of CABG in July '18.
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
## 2018-10-02 NOTE — TELEPHONE ENCOUNTER
Home care calling they are out to see her and her BP is 84/58 sitting and 80/54 standing, she is pale and acting out of it. I asked Dr. William and she said to send her to the ER for evaluation. Home nurse informed to call 911.  Hetal Mane RN

## 2018-10-02 NOTE — ED AVS SNAPSHOT
[facility], Emergency Department    [address] [address]    Phone:  [phone]                                       Mariel Ribeiro   MRN: [id number]    Department:  [facility], Emergency Department   Date of Visit:  10/2/2018           Patient Information     Date Of Birth          1945        Your diagnoses for this visit were:     Episode of generalized weakness (resolved)        You were seen by Jaron Marin MD.      Follow-up Information     Follow up with Rafaela William MD.    Specialty:  Family Practice    Contact information:    [address]  [place] [address]  [phone]          Discharge Instructions       Home per your request.  You feel fine and request no further workup/lab testing in the ER.  Follow up with MD  Return if any concerns.  Make sure to stay well hydrated.      Your next 10 appointments already scheduled     Oct 09, 2018  6:00 PM CDT   Lab with  LAB   [facility] ([facility])    [address]  [address] [facility] [id number] [phone]            Oct 09, 2018  6:30 PM CDT   (Arrive by 6:15 PM)   CORE RETURN with Starla Saez NP   [facility] ([facility])    [address]  [address]  [place] [address] [phone]            Oct 16, 2018  2:30 PM CDT   (Arrive by 2:15 PM)   EMG with Hector Xiong MD   [facility] EMG ([facility])    [address]  3rd Floor  [place] [id number] [phone]           Do not use lotions or creams on the area to be tested. If you are on blood thinners (Warfarin, Coumadin, Lovenox, etc), please contact your primary care physician to check if it is safe to stop them 3 days prior to testing. If you have anxiety, please check with your referring physician to obtain anti-anxiety medication for the procedure.            Oct 16, 2018  6:15 PM CDT   MR BRAIN  W/O CONTRAST with [id number]   [facility] ([facility])    [address]  1st Floor  [facility] [address] [phone]           How do I prepare for my exam? (Food and drink instructions) **If you will be receiving sedation or general anesthesia, please see special notes below.**  How do I prepare for my exam? (Other instructions) Take your medicines as usual, unless your doctor tells you not to. Please remove any body piercings and hair extensions before you arrive. Follow your doctor s orders. If you do not, we may have to postpone your exam. You may or may not receive IV contrast for this exam pending the discretion of the Radiologist.  You do not need to do anything special to prepare. **If you will be receiving sedation or general anesthesia, please see special notes below.**  What should I wear:  The MRI machine uses a strong magnet. Please wear clothes without metal (snaps, zippers). A sweatsuit works well, or we may give you a hospital gown.  How long does the exam take: Most tests take 30 to 60 minutes.  HOWEVER, IF YOUR DOCTOR PRESCRIBES ANESTHESIA please plan on spending four to five hours in the recovery room.  What should I bring: Bring a list of your current medicines to your exam (including vitamins, minerals and over-the-counter drugs). Also bring the results of similar scans you may have had.  Do I need a : **If you will be receiving sedation or general anesthesia, please see special notes below.**  What should I do after the exam? No Restrictions, You may resume normal activities.  What is this test: MRI (magnetic resonance imaging) uses a strong magnet and radio waves to look inside the body. An MRA (magnetic resonance angiogram) does the same thing, but it lets us look at your blood vessels. A computer turns the radio waves into pictures showing cross sections of the body, much like slices of bread. This helps us see any problems more  clearly.  Who should I call with questions: Please call the Imaging Department at your exam site with any questions. Directions, parking instructions, and other information is available on our website, [email or web address].  How do I prepare if I m having sedation or anesthesia? **IMPORTANT** THE INSTRUCTIONS BELOW ARE ONLY FOR THOSE PATIENTS WHO HAVE BEEN TOLD THEY WILL RECEIVE SEDATION OR GENERAL ANESTHESIA DURING THEIR MRI PROCEDURE:  IF YOU WILL RECEIVE SEDATION (take medicine to help you relax during your exam): You must get the medicine from your doctor before you arrive. Bring the medicine to the exam. Do not take it at home. Arrive one hour early. Bring someone who can take you home after the test. Your medicine will make you sleepy. After the exam, you may not drive, take a bus or take a taxi by yourself. No eating 8 hours before your exam. You may have clear liquids up until 4 hours before your exam. (Clear liquids include water, clear tea, black coffee and fruit juice without pulp.)  IF YOU WILL RECEIVE ANESTHESIA (be asleep for your exam): Arrive 1 1/2 hours early. Bring someone who can take you home after the test. You may not drive, take a bus or take a taxi by yourself. No eating 8 hours before your exam. You may have clear liquids up until 4 hours before your exam. (Clear liquids include water, clear tea, black coffee and fruit juice without pulp.) You will spend four to five hours in the recovery room.            Oct 18, 2018  1:30 PM CDT   (Arrive by 1:15 PM)   RETURN DIABETES with Odalis Medley PA-C   [facility] ([facility])    [address]  [address]rd [facility] [address] [phone]            Oct 30, 2018  8:00 PM CDT   Psg Split W/Tcm with SLEEP STUDY RM 1   [facility] ([facility])    [address] [place] [id number] [phone]            Nov 01, 2018  12:00 PM CDT   (Arrive by 11:45 AM)   In Lab Video Visit with  EEG TECH 2   [facility] ([facility])    [address] [place] [id number] [phone]            Nov 13, 2018  9:20 AM CST   Return Sleep Patient with Noah Girard MD   [facility] ([facility])    [address] [place] [id number] [phone]            Nov 13, 2018 11:00 AM CST   (Arrive by 10:45 AM)   Return Visit with CHANTAL Soriano [facility] Neurology ([facility])    [address] [place] [id number] [phone]            Jan 14, 2019  2:30 PM CST   (Arrive by 2:15 PM)   RETURN DIABETES with Keven Jaeger MD   [facility] ([facility])    [address] [place] [address] [phone]              24 Hour Appointment Hotline       To make an appointment at any [facility], call [phone] ([phone]). If you don't have a family doctor or clinic, we will help you find one. [facility] clinics are conveniently located to serve the needs of you and your family.             Review of your medicines      CONTINUE these medicines which may have CHANGED, or have new prescriptions. If we are uncertain of the size of tablets/capsules you have at home, strength may be listed as something that might have changed.        Dose / Directions Last dose taken    LIFESCAN FINEPOINT LANCETS Misc   What changed:  additional instructions   Quantity:  100 each        Use to test blood sugars 2 times daily or as directed.   Refills:  prn        ONE TOUCH ULTRA (DEVICES) Misc   What changed:  See the new instructions.   Quantity:  1        test blood sugar BID   Refills:  0          Our records show that you are taking the medicines listed  below. If these are incorrect, please call your family doctor or clinic.        Dose / Directions Last dose taken    acetaminophen 325 MG tablet   Commonly known as:  TYLENOL   Dose:  650 mg   Quantity:  100 tablet        Take 2 tablets (650 mg) by mouth every 4 hours as needed for mild pain   Refills:  0        albuterol 108 (90 Base) MCG/ACT inhaler   Commonly known as:  PROAIR HFA   Dose:  1-2 puff   Quantity:  1 Inhaler        Inhale 1-2 puffs into the lungs every 4 hours as needed for wheezing   Refills:  PRN        aspirin 325 MG EC tablet   Dose:  325 mg   Quantity:  30 tablet        Take 1 tablet by mouth daily.   Refills:  0        atorvastatin 40 MG tablet   Commonly known as:  LIPITOR   Dose:  40 mg   Quantity:  30 tablet        Take 1 tablet (40 mg) by mouth daily   Refills:  0        B-D U/F 31G X 5 MM   Quantity:  100 each   Generic drug:  insulin pen needle        USE FOR INSULIN INJECTION   Refills:  0        blood glucose monitoring test strip   Commonly known as:  no brand specified   Dose:  1 strip   Quantity:  200 each        1 strip by In Vitro route 2 times daily Strips per patient's preference   Refills:  3        bumetanide 2 MG tablet   Commonly known as:  BUMEX   Dose:  2 mg   Quantity:  60 tablet        Take 1 tablet (2 mg) by mouth 2 times daily   Refills:  1        clotrimazole 1 % cream   Commonly known as:  LOTRIMIN   Quantity:  60 g        Apply topically 2 times daily   Refills:  3        EPINEPHrine 0.3 MG/0.3ML injection 2-pack   Commonly known as:  EPIPEN/ADRENACLICK/or ANY BX GENERIC EQUIV   Dose:  0.3 mg   Quantity:  0.6 mL        Inject 0.3 mLs (0.3 mg) into the muscle once as needed for anaphylaxis   Refills:  0        escitalopram 20 MG tablet   Commonly known as:  LEXAPRO   Dose:  20 mg   Quantity:  30 tablet        Take 1 tablet (20 mg) by mouth every morning   Refills:  0        febuxostat 40 MG Tabs tablet   Commonly known as:  ULORIC   Dose:  40 mg        Take 40 mg by  mouth every evening   Refills:  0        ferrous gluconate 324 (38 Fe) MG tablet   Commonly known as:  FERGON   Dose:  324 mg   Quantity:  36 tablet        Take 1 tablet (324 mg) by mouth Every Mon, Wed, Fri Morning   Refills:  3        FOLIC ACID PO   Dose:  1 mg        Take 1 mg by mouth daily   Refills:  0        guaiFENesin 600 MG 12 hr tablet   Commonly known as:  MUCINEX   Dose:  600 mg        Take 1 tablet (600 mg) by mouth 2 times daily   Refills:  0        insulin isophane human 100 UNIT/ML injection   Commonly known as:  HumuLIN N PEN   Dose:  35 Units   Quantity:  15 mL        Inject 35 Units Subcutaneous 2 times daily   Refills:  3        insulin syringes (disposable) U-100 1 ML Misc   Dose:  1 each   Quantity:  100 each        1 each 5 times daily   Refills:  11        levothyroxine 150 MCG tablet   Commonly known as:  SYNTHROID/LEVOTHROID   Dose:  150 mcg   Quantity:  30 tablet        Take 1 tablet (150 mcg) by mouth daily   Refills:  0        losartan 25 MG tablet   Commonly known as:  COZAAR   Dose:  25 mg   Quantity:  30 tablet        Take 1 tablet (25 mg) by mouth daily   Refills:  3        Metoprolol Tartrate 75 MG Tabs   Dose:  75 mg   Quantity:  180 tablet        Take 75 mg by mouth 2 times daily   Refills:  3        niacin 500 MG CR tablet   Commonly known as:  NIASPAN   Dose:  500 mg        Take 500 mg by mouth daily   Refills:  9        nitroGLYcerin 0.4 MG sublingual tablet   Commonly known as:  NITROSTAT   Quantity:  25 tablet        For chest pain place 1 tablet under the tongue every 5 minutes for 3 doses. If symptoms persist 5 minutes after 1st dose call 911.   Refills:  0        potassium chloride SA 10 MEQ CR tablet   Commonly known as:  K-DUR/KLOR-CON M   Dose:  40 mEq   Quantity:  360 tablet        Take 4 tablets (40 mEq) by mouth 3 times daily   Refills:  3        pregabalin 100 MG capsule   Commonly known as:  LYRICA   Dose:  100 mg   Quantity:  90 capsule        Take 1 capsule  (100 mg) by mouth 2 times daily   Refills:  0        repaglinide 1 MG tablet   Commonly known as:  PRANDIN   Dose:  1 mg   Quantity:  90 tablet        Take 1 tablet (1 mg) by mouth 3 times daily (before meals)   Refills:  3        senna-docusate 8.6-50 MG per tablet   Commonly known as:  SENOKOT-S;PERICOLACE   Dose:  1-2 tablet   Quantity:  30 tablet        Take 1-2 tablets by mouth 2 times daily as needed for constipation   Refills:  0        TIZANIDINE HCL PO   Dose:  4 mg   Indication:  restless leg        Take 4 mg by mouth At Bedtime   Refills:  0        traZODone 50 MG tablet   Commonly known as:  DESYREL   Dose:  150 mg   Quantity:  270 tablet        Take 3 tablets (150 mg) by mouth At Bedtime   Refills:  1        VITAMIN D (CHOLECALCIFEROL) PO   Dose:  12801 Units        Take 10,000 Units by mouth daily   Refills:  0                Orders Needing Specimen Collection     None      Pending Results     No orders found from 9/30/2018 to 10/3/2018.            Pending Culture Results     No orders found from 9/30/2018 to 10/3/2018.            Pending Results Instructions     If you had any lab results that were not finalized at the time of your Discharge, you can call the ED Lab Result RN at [phone]. You will be contacted by this team for any positive Lab results or changes in treatment. The nurses are available 7 days a week from 10A to 6:30P.  You can leave a message 24 hours per day and they will return your call.        Thank you for choosing [facility]       Thank you for choosing [facility] for your care. Our goal is always to provide you with excellent care. Hearing back from our patients is one way we can continue to improve our services. Please take a few minutes to complete the written survey that you may receive in the mail after you visit with us. Thank you!        [employer] Information     [employer] gives you secure access to your electronic health record. If you see a primary care provider, you can also  send messages to your care team and make appointments. If you have questions, please call your primary care clinic.  If you do not have a primary care provider, please call [phone] and they will assist you.        Care EveryWhere ID     This is your Care EveryWhere ID. This could be used by other organizations to access your [facility] medical records  [id number]        Equal Access to Services     KJ SALVADOR : Marcelina Martin, dario cox, hector taylor, gopal pena . So [facility] [phone].    ATENCIÓN: Si habla español, tiene a gillespie disposición servicios gratuitos de asistencia lingüística. Hebert al [phone].    We comply with applicable federal civil rights laws and [place] laws. We do not discriminate on the basis of race, color, national origin, age, disability, sex, sexual orientation, or gender identity.            After Visit Summary       This is your record. Keep this with you and show to your community pharmacist(s) and doctor(s) at your next visit.

## 2018-10-02 NOTE — ED NOTES
Bed: ED18  Expected date:   Expected time:   Means of arrival:   Comments:  STPF 73F with generalized weakness, yellow

## 2018-10-04 ENCOUNTER — PATIENT OUTREACH (OUTPATIENT)
Dept: CARE COORDINATION | Facility: CLINIC | Age: 73
End: 2018-10-04

## 2018-10-04 DIAGNOSIS — M62.81 GENERALIZED MUSCLE WEAKNESS: Primary | ICD-10-CM

## 2018-10-04 NOTE — PROGRESS NOTES
Clinic Care Coordination Contact    Clinic Care Coordination Contact  OUTREACH    Referral Information:  Referral Source: ED Follow-Up    Primary Diagnosis: Other (include Comment box) (weakness low blood sugar )    Chief Complaint   Patient presents with     Clinic Care Coordination - Follow-up     Clinic Care Coordination RN         Universal Utilization:   10/2/2018 ED visit   Clinic Utilization  Difficulty keeping appointments:: No  Compliance Concerns: No  No PCP office visit in Past Year: No  Utilization    Last refreshed: 10/3/2018  1:47 AM:  No Show Count (past year) 3       Last refreshed: 10/3/2018  1:47 AM:  ED visits 2       Last refreshed: 10/3/2018  1:47 AM:  Hospital admissions 5          Current as of: 10/3/2018  1:47 AM             Clinical Concerns:  Current Medical Concerns:  Brief conversation due to PT is in the home doing a visit.  Patient reports didn't want to go to the ED on 10/2    Patient felt it wasn't necessary  .  Weakness quickly resolved itself and her blood pressure was 140/60.  Patient continues home care services for one more week    Pain  Pain (GOAL):: Yes  Type: Chronic (>3mo)  Progression: Worsening  Description of pain: Aching  Limitation of routine activities due to chronic pain:: Yes  Alleviating Factors: Rest, Ice, Heat, Pain Medication  Aggravating Factors: Activity  Health Maintenance Reviewed: Not assessed  Clinical Pathway: None    Medication Management:  No changes          Diet/Exercise/Sleep:  Food Insecurity: No  Tube Feeding: No  Exercise:: Unable to exercise  Inadequate activity/exercise (GOAL):: No  Significant changes in sleep pattern (GOAL): No          Goals:   Goals        General    Pain Management (pt-stated)     Notes - Note created  9/12/2018 11:30 AM by Senia Durán RN    Goal Statement: I will decrease back pain   Measure of Success: Increased mobility with daily activity   Supportive Steps to Achieve:   I will start taking Tylenol 100 mg 3 times a  day per Dr William   I will use heat ice and rest   Barriers: Deconditioned   Strengths: Continues home care services   Date to Achieve By: to be determined   Patient expressed understanding of goal: yes                   Future Appointments              In 5 days  LAB [facility], [facility]    In 5 days Starla Saez NP [facility], [facility]    In 1 week Hector Xiong MD [facility] EMG, [facility]    In 1 week [id number] [facility] MRI, [facility]    In 2 weeks Odalis Medley PA-C [facility] Endocrinology, [facility]    In 3 weeks SLEEP STUDY RM 1 [facility]    In 4 weeks  EEG TECH 2 [facility], [facility]    In 1 month Noah Girard MD [facility]    In 1 month Paula Hdz, APRN CNP [facility] Neurology, [facility]    In 3 months Keven Jaeger MD [facility]inology, [facility]          Plan:  Will follow up after home care discharge     Senia Durán RN / Clinical Care Coordinator     [facility]   [email or web address]  Office :  [phone] / Fax :  [phone]

## 2018-10-08 DIAGNOSIS — E11.22 TYPE 2 DIABETES MELLITUS WITH STAGE 3 CHRONIC KIDNEY DISEASE, WITH LONG-TERM CURRENT USE OF INSULIN (H): Primary | Chronic | ICD-10-CM

## 2018-10-08 DIAGNOSIS — Z79.4 TYPE 2 DIABETES MELLITUS WITH STAGE 3 CHRONIC KIDNEY DISEASE, WITH LONG-TERM CURRENT USE OF INSULIN (H): Primary | Chronic | ICD-10-CM

## 2018-10-08 DIAGNOSIS — N18.30 TYPE 2 DIABETES MELLITUS WITH STAGE 3 CHRONIC KIDNEY DISEASE, WITH LONG-TERM CURRENT USE OF INSULIN (H): Primary | Chronic | ICD-10-CM

## 2018-10-09 ENCOUNTER — OFFICE VISIT (OUTPATIENT)
Dept: CARDIOLOGY | Facility: CLINIC | Age: 73
End: 2018-10-09
Attending: NURSE PRACTITIONER
Payer: MEDICARE

## 2018-10-09 VITALS
BODY MASS INDEX: 47.09 KG/M2 | HEIGHT: 66 IN | OXYGEN SATURATION: 98 % | WEIGHT: 293 LBS | SYSTOLIC BLOOD PRESSURE: 140 MMHG | HEART RATE: 56 BPM | DIASTOLIC BLOOD PRESSURE: 68 MMHG

## 2018-10-09 DIAGNOSIS — I50.32 CHRONIC DIASTOLIC HEART FAILURE (H): Chronic | ICD-10-CM

## 2018-10-09 LAB
ANION GAP SERPL CALCULATED.3IONS-SCNC: 4 MMOL/L (ref 3–14)
BUN SERPL-MCNC: 26 MG/DL (ref 7–30)
CALCIUM SERPL-MCNC: 9.2 MG/DL (ref 8.5–10.1)
CHLORIDE SERPL-SCNC: 105 MMOL/L (ref 94–109)
CO2 SERPL-SCNC: 32 MMOL/L (ref 20–32)
CREAT SERPL-MCNC: 1.48 MG/DL (ref 0.52–1.04)
GFR SERPL CREATININE-BSD FRML MDRD: 35 ML/MIN/1.7M2
GLUCOSE SERPL-MCNC: 64 MG/DL (ref 70–99)
NT-PROBNP SERPL-MCNC: 685 PG/ML (ref 0–125)
POTASSIUM SERPL-SCNC: 4.1 MMOL/L (ref 3.4–5.3)
SODIUM SERPL-SCNC: 141 MMOL/L (ref 133–144)

## 2018-10-09 PROCEDURE — 83880 ASSAY OF NATRIURETIC PEPTIDE: CPT | Performed by: NURSE PRACTITIONER

## 2018-10-09 PROCEDURE — G0463 HOSPITAL OUTPT CLINIC VISIT: HCPCS | Mod: ZF

## 2018-10-09 PROCEDURE — 36415 COLL VENOUS BLD VENIPUNCTURE: CPT | Performed by: NURSE PRACTITIONER

## 2018-10-09 PROCEDURE — 80048 BASIC METABOLIC PNL TOTAL CA: CPT | Performed by: NURSE PRACTITIONER

## 2018-10-09 PROCEDURE — 99214 OFFICE O/P EST MOD 30 MIN: CPT | Mod: ZP | Performed by: NURSE PRACTITIONER

## 2018-10-09 RX ORDER — POTASSIUM CHLORIDE 750 MG/1
20-40 TABLET, EXTENDED RELEASE ORAL 3 TIMES DAILY
Qty: 360 TABLET | Refills: 3 | Status: SHIPPED | OUTPATIENT
Start: 2018-10-09 | End: 2018-12-21

## 2018-10-09 RX ORDER — BUMETANIDE 2 MG/1
2 TABLET ORAL 2 TIMES DAILY
Qty: 60 TABLET | Refills: 1 | Status: SHIPPED | OUTPATIENT
Start: 2018-10-09 | End: 2018-11-12

## 2018-10-09 ASSESSMENT — PAIN SCALES - GENERAL: PAINLEVEL: MILD PAIN (3)
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
## 2018-10-09 NOTE — MR AVS SNAPSHOT
After Visit Summary   10/9/2018    Mariel Palmer    MRN: [id number]           Patient Information     Date Of Birth          1945        Visit Information        Provider Department      10/9/2018 6:30 PM Starla Saez NP [facility] Heart Care        Today's Diagnoses     Chronic diastolic heart failure (H)          Care Instructions    You were seen today in the Cardiovascular Clinic at the [facility].     Cardiology Providers you saw during your visit: Starla Saez NP     1. Please reduce Bumex to 2 mg every morning. On Mondays, Wednesdays, and Fridays take an additional 2 mg in the afternoon    2. Please reduce your potassium to 40 mEq three times daily on Mondays, Wednesdays, and Fridays. The rest of the week, reduce to 20 mEq three times daily    3. Lease have labs drawn when you return to clinic for other appointments on 10/18    4. Please return to [facility] clinic in 1 month or as needed    5. Follow up with your primary care provider if your left leg continues to bother you, you notice fever, or worsening of redness or tenderness      Results for MARIEL PALMER (MRN [id number]) as of 10/9/2018 19:06   Ref. Range 10/9/2018 18:31   Sodium Latest Ref Range: 133 - 144 mmol/L 141   Potassium Latest Ref Range: 3.4 - 5.3 mmol/L 4.1   Chloride Latest Ref Range: 94 - 109 mmol/L 105   Carbon Dioxide Latest Ref Range: 20 - 32 mmol/L 32   Urea Nitrogen Latest Ref Range: 7 - 30 mg/dL 26   Creatinine Latest Ref Range: 0.52 - 1.04 mg/dL 1.48 (H)   GFR Estimate Latest Ref Range: >60 mL/min/1.7m2 35 (L)   GFR Estimate If Black Latest Ref Range: >60 mL/min/1.7m2 42 (L)   Calcium Latest Ref Range: 8.5 - 10.1 mg/dL 9.2   Anion Gap Latest Ref Range: 3 - 14 mmol/L 4   N-Terminal Pro Bnp Latest Ref Range: 0 - 125 pg/mL 685 (H)   Glucose Latest Ref Range: 70 - 99 mg/dL 64 (L)       Please limit your fluid intake to 2 L (64 ounces) daily.  2 Liters a day = 8.5 cups, or 64 ounces.  Please limit  "your salt intake to 2 grams a day or less.     If you gain 2# in 24 hours or 5# in one week call Serena Streeter RN so we can adjust your medications as needed over the phone.     Please feel free to call me with any questions or concerns.       Serena Streeter RN BSN CHFN  [facility]  Cardiology Care Coordinator-Heart Failure Clinic     Questions and [phone].   First press #1 for the University and then press #3 for \"Medical Questions\" to reach us Cardiology Nurses.      On Call Cardiologist for after hours or on weekends: [phone]   option #4 and ask to speak to the on-call Cardiologist. Inform them you are a CORE/heart failure patient at the [facility].           If you need a medication refill please contact your pharmacy.  Please allow 3 business days for your refill to be completed.  _______________________________________________________  C.O.R.E[facility] CLINIC Cardiomyopathy, Optimization, Rehabilitation, Education   The C.O.R.E. CLINIC is a heart failure specialty clinic within the [facility] where you will work with specialized nurse practitioners dedicated to helping patients with heart failure carefully adjust medications, receive education, and learn who and when to call if symptoms develop. They specialize in helping you better understand your condition, slow the progression of your disease, improve the length and quality of your life, help you detect future heart problems before they become life threatening, and avoid hospitalizations.  As always, thank you for trusting us with your health care needs!           Follow-ups after your visit        Additional Services     Follow-Up with [facility]                 Your next 10 appointments already scheduled     Oct 16, 2018  2:30 PM CDT   (Arrive by 2:15 PM)   EMG with Hector Xiong MD   [facility] ([facility])    [address] [place] [address] " Se  3rd Floor  [facility] [address] [phone]           Do not use lotions or creams on the area to be tested. If you are on blood thinners (Warfarin, Coumadin, Lovenox, etc), please contact your primary care physician to check if it is safe to stop them 3 days prior to testing. If you have anxiety, please check with your referring physician to obtain anti-anxiety medication for the procedure.            Oct 16, 2018  6:15 PM CDT   MR BRAIN W/O CONTRAST with [id number]   [facility] ([facility])    [address] [facility]  [facility] [address]   [phone]           How do I prepare for my exam? (Food and drink instructions) **If you will be receiving sedation or general anesthesia, please see special notes below.**  How do I prepare for my exam? (Other instructions) Take your medicines as usual, unless your doctor tells you not to. Please remove any body piercings and hair extensions before you arrive. Follow your doctor s orders. If you do not, we may have to postpone your exam. You may or may not receive IV contrast for this exam pending the discretion of the Radiologist.  You do not need to do anything special to prepare. **If you will be receiving sedation or general anesthesia, please see special notes below.**  What should I wear:  The MRI machine uses a strong magnet. Please wear clothes without metal (snaps, zippers). A sweatsuit works well, or we may give you a hospital gown.  How long does the exam take: Most tests take 30 to 60 minutes.  HOWEVER, IF YOUR DOCTOR PRESCRIBES ANESTHESIA please plan on spending four to five hours in the recovery room.  What should I bring: Bring a list of your current medicines to your exam (including vitamins, minerals and over-the-counter drugs). Also bring the results of similar scans you may have had.  Do I need a : **If you will be receiving sedation or general anesthesia, please see special notes  below.**  What should I do after the exam? No Restrictions, You may resume normal activities.  What is this test: MRI (magnetic resonance imaging) uses a strong magnet and radio waves to look inside the body. An MRA (magnetic resonance angiogram) does the same thing, but it lets us look at your blood vessels. A computer turns the radio waves into pictures showing cross sections of the body, much like slices of bread. This helps us see any problems more clearly.  Who should I call with questions: Please call the Imaging Department at your exam site with any questions. Directions, parking instructions, and other information is available on our website, [employer].  How do I prepare if I m having sedation or anesthesia? **IMPORTANT** THE INSTRUCTIONS BELOW ARE ONLY FOR THOSE PATIENTS WHO HAVE BEEN TOLD THEY WILL RECEIVE SEDATION OR GENERAL ANESTHESIA DURING THEIR MRI PROCEDURE:  IF YOU WILL RECEIVE SEDATION (take medicine to help you relax during your exam): You must get the medicine from your doctor before you arrive. Bring the medicine to the exam. Do not take it at home. Arrive one hour early. Bring someone who can take you home after the test. Your medicine will make you sleepy. After the exam, you may not drive, take a bus or take a taxi by yourself. No eating 8 hours before your exam. You may have clear liquids up until 4 hours before your exam. (Clear liquids include water, clear tea, black coffee and fruit juice without pulp.)  IF YOU WILL RECEIVE ANESTHESIA (be asleep for your exam): Arrive 1 1/2 hours early. Bring someone who can take you home after the test. You may not drive, take a bus or take a taxi by yourself. No eating 8 hours before your exam. You may have clear liquids up until 4 hours before your exam. (Clear liquids include water, clear tea, black coffee and fruit juice without pulp.) You will spend four to five hours in the recovery room.            Oct 18, 2018  1:30 PM CDT   (Arrive by  1:15 PM)   RETURN DIABETES with Odalis Medley PA-C   [facility] ([facility])    [address] [place] [id number] [phone]            Oct 30, 2018  8:00 PM CDT   Psg Split W/Tcm with SLEEP STUDY RM 1   [facility] ([facility])    [address] [place] [id number] [phone]            Nov 01, 2018 12:00 PM CDT   (Arrive by 11:45 AM)   In Lab Video Visit with  EEG TECH 2   [facility] ([facility])    [address] [place] [id number] [phone]            Nov 13, 2018  9:20 AM CST   Return Sleep Patient with Noah Girard MD   [facility] ([facility])    [address] [place] [id number] [phone]            Nov 13, 2018 11:00 AM CST   (Arrive by 10:45 AM)   Return Visit with CHANTAL Soriano CNP   [facility] Neurology ([facility])    [address] [place] [id number] [phone]            Jan 14, 2019  2:30 PM CST   (Arrive by 2:15 PM)   RETURN DIABETES with Keven Jaeger MD   [facility] ([facility])    [address] [place] [address] [phone]              Future tests that were ordered for you today     Open Future Orders        Priority Expected Expires Ordered    Basic metabolic panel Routine 10/18/2018 10/26/2018 10/9/2018    Follow-Up with [facility] Routine 11/6/2018 11/23/2018 10/9/2018            Who to contact     If you have questions or need follow up information about today's clinic visit or your schedule please contact [facility] C[facility] directly at [phone].  Normal or non-critical lab and imaging results will be  "communicated to you by [employer], letter or phone within 4 business days after the clinic has received the results. If you do not hear from us within 7 days, please contact the clinic through [employer] or phone. If you have a critical or abnormal lab result, we will notify you by phone as soon as possible.  Submit refill requests through [employer] or call your pharmacy and they will forward the refill request to us. Please allow 3 business days for your refill to be completed.          Additional Information About Your Visit        [employer] Information     [employer] gives you secure access to your electronic health record. If you see a primary care provider, you can also send messages to your care team and make appointments. If you have questions, please call your primary care clinic.  If you do not have a primary care provider, please call [phone] and they will assist you.        Care EveryWhere ID     This is your Care EveryWhere ID. This could be used by other organizations to access your [facility] medical records  [id number]        Your Vitals Were     Pulse Height Pulse Oximetry BMI (Body Mass Index)          56 1.676 m (5' 6\") 98% 48.1 kg/m2         Blood Pressure from Last 3 Encounters:   10/09/18 140/68   10/02/18 141/53   09/26/18 109/56    Weight from Last 3 Encounters:   10/09/18 135.2 kg (298 lb)   10/02/18 133.8 kg (295 lb)   09/26/18 135.6 kg (299 lb)                 Today's Medication Changes          These changes are accurate as of 10/9/18  7:40 PM.  If you have any questions, ask your nurse or doctor.               These medicines have changed or have updated prescriptions.        Dose/Directions    acetaminophen 325 MG tablet   Commonly known as:  TYLENOL   This may have changed:  when to take this   Used for:  S/P CABG x 3, Acute post-operative pain        Dose:  650 mg   Take 2 tablets (650 mg) by mouth every 4 hours as needed for mild pain   Quantity:  100 tablet   Refills:  0       bumetanide 2 " MG tablet   Commonly known as:  BUMEX   This may have changed:  additional instructions   Used for:  Chronic diastolic heart failure (H)   Changed by:  Starla Saez NP        Dose:  2 mg   Take 1 tablet (2 mg) by mouth 2 times daily Take once or twice daily, as directed by [facility] clinic   Quantity:  60 tablet   Refills:  1       LIFESCAN FINEPOINT LANCETS Misc   This may have changed:  additional instructions   Used for:  Type 2 diabetes mellitus with diabetic polyneuropathy, without long-term current use of insulin (H)        Use to test blood sugars 2 times daily or as directed.   Quantity:  100 each   Refills:  prn       ONE TOUCH ULTRA (DEVICES) Misc   This may have changed:  See the new instructions.   Used for:  Type II or unspecified type diabetes mellitus without mention of complication, not stated as uncontrolled        test blood sugar BID   Quantity:  1   Refills:  0       potassium chloride SA 10 MEQ CR tablet   Commonly known as:  K-DUR/KLOR-CON M   This may have changed:    - how much to take  - additional instructions   Used for:  Chronic diastolic heart failure (H)   Changed by:  Starla Saez NP        Dose:  20-40 mEq   Take 2-4 tablets (20-40 mEq) by mouth 3 times daily As directed by C[facility]   Quantity:  360 tablet   Refills:  3       senna-docusate 8.6-50 MG per tablet   Commonly known as:  SENOKOT-S;PERICOLACE   This may have changed:  when to take this   Used for:  Atherosclerosis of native coronary artery without angina pectoris, unspecified whether native or transplanted heart        Dose:  1-2 tablet   Take 1-2 tablets by mouth 2 times daily as needed for constipation   Quantity:  30 tablet   Refills:  0            Where to get your medicines      These medications were sent to [id number] - [place], [place] - [address] AT [facility]  [address], [place] [address]    Hours:  24-hours Phone:  [phone]     bumetanide 2 MG tablet     potassium chloride SA 10 MEQ CR tablet                Primary Care Provider Office Phone # Fax #    Rafaela William [phone]       [address]  [place] [address]        Goals        General    Pain Management (pt-stated)     Notes - Note created  9/12/2018 11:30 AM by Senia Durán, RN    Goal Statement: I will decrease back pain   Measure of Success: Increased mobility with daily activity   Supportive Steps to Achieve:   I will start taking Tylenol 100 mg 3 times a day per Dr William   I will use heat ice and rest   Barriers: Deconditioned   Strengths: Continues home care services   Date to Achieve By: to be determined   Patient expressed understanding of goal: yes          Equal Access to Services     KJ SALVADOR AH: Marcelina Martin, wayoseph cox, hetcor kaalmajanett taylor, gopal pena . So [facility] [phone].    ATENCIÓN: Si habla español, tiene a gillespie disposición servicios gratuitos de asistencia lingüística. Llame al [phone].    We comply with applicable federal civil rights laws and [place] laws. We do not discriminate on the basis of race, color, national origin, age, disability, sex, sexual orientation, or gender identity.            Thank you!     Thank you for choosing [facility]  for your care. Our goal is always to provide you with excellent care. Hearing back from our patients is one way we can continue to improve our services. Please take a few minutes to complete the written survey that you may receive in the mail after your visit with us. Thank you!             Your Updated Medication List - Protect others around you: Learn how to safely use, store and throw away your medicines at www.disposemymeds.org.          This list is accurate as of 10/9/18  7:40 PM.  Always use your most recent med list.                   Brand Name Dispense Instructions for use Diagnosis    acetaminophen 325 MG tablet     TYLENOL    100 tablet    Take 2 tablets (650 mg) by mouth every 4 hours as needed for mild pain    S/P CABG x 3, Acute post-operative pain       albuterol 108 (90 Base) MCG/ACT inhaler    PROAIR HFA    1 Inhaler    Inhale 1-2 puffs into the lungs every 4 hours as needed for wheezing    Chronic obstructive pulmonary disease, unspecified COPD type (H)       aspirin 325 MG EC tablet     30 tablet    Take 1 tablet by mouth daily.        atorvastatin 40 MG tablet    LIPITOR    30 tablet    Take 1 tablet (40 mg) by mouth daily    Atherosclerosis of native coronary artery without angina pectoris, unspecified whether native or transplanted heart       B-D U/F 31G X 5 MM   Generic drug:  insulin pen needle     100 each    USE FOR INSULIN INJECTION    Type 2 diabetes mellitus with diabetic polyneuropathy, without long-term current use of insulin (H)       blood glucose monitoring test strip    no brand specified    200 each    1 strip by In Vitro route 2 times daily Strips per patient's preference    Type 2 diabetes mellitus with diabetic polyneuropathy, without long-term current use of insulin (H)       bumetanide 2 MG tablet    BUMEX    60 tablet    Take 1 tablet (2 mg) by mouth 2 times daily Take once or twice daily, as directed by [facility] clinic    Chronic diastolic heart failure (H)       clotrimazole 1 % cream    LOTRIMIN    60 g    Apply topically 2 times daily    Dermatitis       EPINEPHrine 0.3 MG/0.3ML injection 2-pack    EPIPEN/ADRENACLICK/or ANY BX GENERIC EQUIV    0.6 mL    Inject 0.3 mLs (0.3 mg) into the muscle once as needed for anaphylaxis    Allergic reaction, subsequent encounter       escitalopram 20 MG tablet    LEXAPRO    30 tablet    Take 1 tablet (20 mg) by mouth every morning    Recurrent major depressive disorder, in partial remission (H)       febuxostat 40 MG Tabs tablet    ULORIC     Take 40 mg by mouth every evening        ferrous gluconate 324 (38 Fe) MG tablet    FERGON    36 tablet    Take 1  tablet (324 mg) by mouth Every Mon, Wed, Fri Morning    Chronic diastolic heart failure (H), Iron deficiency anemia secondary to inadequate dietary iron intake       FOLIC ACID PO      Take 1 mg by mouth daily        guaiFENesin 600 MG 12 hr tablet    MUCINEX     Take 1 tablet (600 mg) by mouth 2 times daily        insulin isophane human 100 UNIT/ML injection    HumuLIN N PEN    15 mL    Inject 35 Units Subcutaneous 2 times daily    Type 2 diabetes mellitus with stage 3 chronic kidney disease, with long-term current use of insulin (H)       insulin syringes (disposable) U-100 1 ML Misc     100 each    1 each 5 times daily    Type 2 diabetes mellitus with chronic kidney disease, without long-term current use of insulin, unspecified CKD stage (H)       levothyroxine 150 MCG tablet    SYNTHROID/LEVOTHROID    30 tablet    Take 1 tablet (150 mcg) by mouth daily    Other specified hypothyroidism       [employer] FINEPOINT LANCETS Misc     100 each    Use to test blood sugars 2 times daily or as directed.    Type 2 diabetes mellitus with diabetic polyneuropathy, without long-term current use of insulin (H)       losartan 25 MG tablet    COZAAR    30 tablet    Take 1 tablet (25 mg) by mouth daily    Chronic diastolic heart failure (H)       Metoprolol Tartrate 75 MG Tabs     180 tablet    Take 75 mg by mouth 2 times daily    Atherosclerosis of native coronary artery without angina pectoris, unspecified whether native or transplanted heart       niacin 500 MG CR tablet    NIASPAN     Take 500 mg by mouth daily        nitroGLYcerin 0.4 MG sublingual tablet    NITROSTAT    25 tablet    For chest pain place 1 tablet under the tongue every 5 minutes for 3 doses. If symptoms persist 5 minutes after 1st dose call 911.    Atherosclerosis of native coronary artery without angina pectoris, unspecified whether native or transplanted heart       ONE TOUCH ULTRA (DEVICES) Misc     1    test blood sugar BID    Type II or unspecified type  diabetes mellitus without mention of complication, not stated as uncontrolled       potassium chloride SA 10 MEQ CR tablet    K-DUR/KLOR-CON M    360 tablet    Take 2-4 tablets (20-40 mEq) by mouth 3 times daily As directed by [facility]    Chronic diastolic heart failure (H)       pregabalin 100 MG capsule    LYRICA    90 capsule    Take 1 capsule (100 mg) by mouth 2 times daily    Pain in joint of left shoulder       repaglinide 1 MG tablet    PRANDIN    90 tablet    Take 1 tablet (1 mg) by mouth 3 times daily (before meals)    Type 2 diabetes mellitus with diabetic chronic kidney disease, unspecified CKD stage, unspecified long term insulin use status       senna-docusate 8.6-50 MG per tablet    SENOKOT-S;PERICOLACE    30 tablet    Take 1-2 tablets by mouth 2 times daily as needed for constipation    Atherosclerosis of native coronary artery without angina pectoris, unspecified whether native or transplanted heart       TIZANIDINE HCL PO      Take 4 mg by mouth At Bedtime        traZODone 50 MG tablet    DESYREL    270 tablet    Take 3 tablets (150 mg) by mouth At Bedtime    Recurrent major depressive disorder, in partial remission (H)       VITAMIN D (CHOLECALCIFEROL) PO      Take 10,000 Units by mouth daily

## 2018-10-09 NOTE — TELEPHONE ENCOUNTER
"Requested Prescriptions   Pending Prescriptions Disp Refills     B-D U/F 31G X 8 MM insulin pen needle [Pharmacy Med Name: B-D PEN NDL SHRT [id number](5/16) YANDEL]  Last Written Prescription Date:  4-30-18  Last Fill Quantity: 100 ea,  # refills: 0   Last office visit: 9/12/2018 with prescribing provider:  Rafaela William    Future Office Visit:   100 each 0     Sig: USE FOR INSULIN INJECTION    Diabetic Supplies Protocol Passed    10/8/2018  6:53 PM       Passed - Patient is 18 years of age or older       Passed - Recent (6 mo) or future (30 days) visit within the authorizing provider's specialty    Patient had office visit in the last 6 months or has a visit in the next 30 days with authorizing provider.  See \"Patient Info\" tab in inbasket, or \"Choose Columns\" in Meds & Orders section of the refill encounter.              "

## 2018-10-10 RX ORDER — PEN NEEDLE, DIABETIC 31 GX5/16"
NEEDLE, DISPOSABLE MISCELLANEOUS
Qty: 100 EACH | Refills: 0 | Status: SHIPPED | OUTPATIENT
Start: 2018-10-10 | End: 2019-01-02
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
## 2018-10-10 NOTE — PATIENT INSTRUCTIONS
You were seen today in the Cardiovascular Clinic at the [facility].     Cardiology Providers you saw during your visit: Starla Saez NP     1. Please reduce Bumex to 2 mg every morning. On Mondays, Wednesdays, and Fridays take an additional 2 mg in the afternoon    2. Please reduce your potassium to 40 mEq three times daily on Mondays, Wednesdays, and Fridays. The rest of the week, reduce to 20 mEq three times daily    3. Lease have labs drawn when you return to clinic for other appointments on 10/18    4. Please return to CORE clinic in 1 month or as needed    5. Follow up with your primary care provider if your left leg continues to bother you, you notice fever, or worsening of redness or tenderness      Results for PALMER ROSY MONTERROSO (MRN [id number]) as of 10/9/2018 19:06   Ref. Range 10/9/2018 18:31   Sodium Latest Ref Range: 133 - 144 mmol/L 141   Potassium Latest Ref Range: 3.4 - 5.3 mmol/L 4.1   Chloride Latest Ref Range: 94 - 109 mmol/L 105   Carbon Dioxide Latest Ref Range: 20 - 32 mmol/L 32   Urea Nitrogen Latest Ref Range: 7 - 30 mg/dL 26   Creatinine Latest Ref Range: 0.52 - 1.04 mg/dL 1.48 (H)   GFR Estimate Latest Ref Range: >60 mL/min/1.7m2 35 (L)   GFR Estimate If Black Latest Ref Range: >60 mL/min/1.7m2 42 (L)   Calcium Latest Ref Range: 8.5 - 10.1 mg/dL 9.2   Anion Gap Latest Ref Range: 3 - 14 mmol/L 4   N-Terminal Pro Bnp Latest Ref Range: 0 - 125 pg/mL 685 (H)   Glucose Latest Ref Range: 70 - 99 mg/dL 64 (L)       Please limit your fluid intake to 2 L (64 ounces) daily.  2 Liters a day = 8.5 cups, or 64 ounces.  Please limit your salt intake to 2 grams a day or less.     If you gain 2# in 24 hours or 5# in one week call Serena Streeter RN so we can adjust your medications as needed over the phone.     Please feel free to call me with any questions or concerns.       Serena Streeter RN BSN CHFN  [facility]  Cardiology Care Coordinator-Heart Failure  "Clinic     Questions and [phone].   First press #1 for the University and then press #3 for \"Medical Questions\" to reach us Cardiology Nurses.      On Call Cardiologist for after hours or on weekends: [phone]   option #4 and ask to speak to the on-call Cardiologist. Inform them you are a CORE/heart failure patient at the [facility].           If you need a medication refill please contact your pharmacy.  Please allow 3 business days for your refill to be completed.  _______________________________________________________  [facility] Cardiomyopathy, Optimization, Rehabilitation, Education   The [facility] CLINIC is a heart failure specialty clinic within the [facility] where you will work with specialized nurse practitioners dedicated to helping patients with heart failure carefully adjust medications, receive education, and learn who and when to call if symptoms develop. They specialize in helping you better understand your condition, slow the progression of your disease, improve the length and quality of your life, help you detect future heart problems before they become life threatening, and avoid hospitalizations.  As always, thank you for trusting us with your health care needs!   "

## 2018-10-10 NOTE — TELEPHONE ENCOUNTER
Prescription approved per [facility], [facility] or [facility] refill protocol.  Opal Murphy RN - Triage  [facility]

## 2018-10-12 ENCOUNTER — DOCUMENTATION ONLY (OUTPATIENT)
Dept: CARE COORDINATION | Facility: CLINIC | Age: 73
End: 2018-10-12

## 2018-10-12 NOTE — PROGRESS NOTES
[facility] now requests orders and shares plan of care/discharge summaries for some patients through [employer].  Please REPLY TO THIS MESSAGE OR ROUTE BACK TO THE AUTHOR in order to give authorization for orders when needed.  This is considered a verbal order, you will still receive a faxed copy of orders for signature.  Thank you for your assistance in improving collaboration for our patients.    ORDER: Continued home PT 1w3 to focus on patients goal of being able to walk her dog outside with safe technique. Add SW evaluation to identify transportation options and community resources.    Rossi Lundberg, PT, DPT  [email or web address]  [phone]

## 2018-10-16 ENCOUNTER — OFFICE VISIT (OUTPATIENT)
Dept: NEUROLOGY | Facility: CLINIC | Age: 73
End: 2018-10-16
Attending: NURSE PRACTITIONER
Payer: MEDICARE

## 2018-10-16 DIAGNOSIS — G57.31 PERONEAL NEUROPATHY, RIGHT: ICD-10-CM

## 2018-10-16 DIAGNOSIS — G62.89 AXONAL SENSORIMOTOR NEUROPATHY: Primary | ICD-10-CM

## 2018-10-16 DIAGNOSIS — R20.9 DISTURBANCE OF SKIN SENSATION: ICD-10-CM

## 2018-10-16 DIAGNOSIS — Z86.39 H/O DIABETES MELLITUS: ICD-10-CM

## 2018-10-16 NOTE — MR AVS SNAPSHOT
After Visit Summary   10/16/2018    Mariel Ribeiro    MRN: [id number]           Patient Information     Date Of Birth          1945        Visit Information        Provider Department      10/16/2018 2:30 PM Hector Xiong MD [facility] EMG        Today's Diagnoses     Axonal sensorimotor neuropathy    -  1    Disturbance of skin sensation        H/O diabetes mellitus        Peroneal neuropathy, right           Follow-ups after your visit        Your next 10 appointments already scheduled     Oct 18, 2018 12:45 PM CDT   LAB with  LAB   [facility] Lab ([facility])    [address] [place] [id number] [phone]           Please do not eat 10-12 hours before your appointment if you are coming in fasting for labs on lipids, cholesterol, or glucose (sugar). This does not apply to pregnant women. Water, hot tea and black coffee (with nothing added) are okay. Do not drink other fluids, diet soda or chew gum.            Oct 18, 2018  1:30 PM CDT   (Arrive by 1:15 PM)   RETURN DIABETES with Odalis Medley PA-C   [facility] Endocrinology ([facility])    [address] [place] [id number] [phone]            Oct 25, 2018  5:30 PM CDT   MR BRAIN W/O CONTRAST with [address] [facility] ([facility])    [address] [place] [id number] [phone]           How do I prepare for my exam? (Food and drink instructions) **If you will be receiving sedation or general anesthesia, please see special notes below.**  How do I prepare for my exam? (Other instructions) Take your medicines as usual, unless your doctor tells you not to. Please remove any body piercings and hair extensions before you arrive. Follow your doctor s orders. If you do not, we may have to postpone your exam. You may or may not receive IV contrast for  this exam pending the discretion of the Radiologist.  You do not need to do anything special to prepare. **If you will be receiving sedation or general anesthesia, please see special notes below.**  What should I wear:  The MRI machine uses a strong magnet. Please wear clothes without metal (snaps, zippers). A sweatsuit works well, or we may give you a hospital gown.  How long does the exam take: Most tests take 30 to 60 minutes.  HOWEVER, IF YOUR DOCTOR PRESCRIBES ANESTHESIA please plan on spending four to five hours in the recovery room.  What should I bring: Bring a list of your current medicines to your exam (including vitamins, minerals and over-the-counter drugs). Also bring the results of similar scans you may have had.  Do I need a : **If you will be receiving sedation or general anesthesia, please see special notes below.**  What should I do after the exam? No Restrictions, You may resume normal activities.  What is this test: MRI (magnetic resonance imaging) uses a strong magnet and radio waves to look inside the body. An MRA (magnetic resonance angiogram) does the same thing, but it lets us look at your blood vessels. A computer turns the radio waves into pictures showing cross sections of the body, much like slices of bread. This helps us see any problems more clearly.  Who should I call with questions: Please call the Imaging Department at your exam site with any questions. Directions, parking instructions, and other information is available on our website, [email or web address].  How do I prepare if I m having sedation or anesthesia? **IMPORTANT** THE INSTRUCTIONS BELOW ARE ONLY FOR THOSE PATIENTS WHO HAVE BEEN TOLD THEY WILL RECEIVE SEDATION OR GENERAL ANESTHESIA DURING THEIR MRI PROCEDURE:  IF YOU WILL RECEIVE SEDATION (take medicine to help you relax during your exam): You must get the medicine from your doctor before you arrive. Bring the medicine to the exam. Do not take it at home. Arrive  one hour early. Bring someone who can take you home after the test. Your medicine will make you sleepy. After the exam, you may not drive, take a bus or take a taxi by yourself. No eating 8 hours before your exam. You may have clear liquids up until 4 hours before your exam. (Clear liquids include water, clear tea, black coffee and fruit juice without pulp.)  IF YOU WILL RECEIVE ANESTHESIA (be asleep for your exam): Arrive 1 1/2 hours early. Bring someone who can take you home after the test. You may not drive, take a bus or take a taxi by yourself. No eating 8 hours before your exam. You may have clear liquids up until 4 hours before your exam. (Clear liquids include water, clear tea, black coffee and fruit juice without pulp.) You will spend four to five hours in the recovery room.            Oct 30, 2018  8:00 PM CDT   Psg Split W/Tcm with SLEEP STUDY RM 1   [facility])    [address] [place] [phone]            Nov 01, 2018 12:00 PM CDT   (Arrive by 11:45 AM)   In Lab Video Visit with  EEG TECH 2   [facility] OUTPATIENT ([facility])    [address] [place] [address]  3rd Floor  [facility] [id number] [phone]            Nov 12, 2018 12:00 PM CST   Lab with  LAB   [facility] ([facility])    [address] [place] [address]  1st Floor  [facility] [id number] [phone]            Nov 12, 2018 12:30 PM CST   (Arrive by 12:15 PM)   CORE RETURN with Starla Saez NP   [facility] Heart [facility] ([facility])    [address] [place] [address]  [address]  [facility] [id number] [phone]            Nov 13, 2018  9:20 AM CST   Return Sleep Patient with Noah Girard MD   [facility] ([facility])    [address] [place] [phone]             Nov 13, 2018 11:00 AM CST   (Arrive by 10:45 AM)   Return Visit with CHANTAL Soriano CNP   [facility] Neurology ([facility])    [address] [place] [id number] [phone]            Jan 14, 2019  2:30 PM CST   (Arrive by 2:15 PM)   RETURN DIABETES with Keven Jaeger MD   [facility] Endocrinology ([facility])    [address] [place] [id number] [phone]              Future tests that were ordered for you today     Open Future Orders        Priority Expected Expires Ordered    Needle EMG Each Extremity w/Paraspinal Area Complete (89984) Routine  10/16/2019 10/16/2018            Who to contact     Please call your clinic at [phone] to:    Ask questions about your health    Make or cancel appointments    Discuss your medicines    Learn about your test results    Speak to your doctor            Additional Information About Your Visit        Endorse Information     [employer] gives you secure access to your electronic health record. If you see a primary care provider, you can also send messages to your care team and make appointments. If you have questions, please call your primary care clinic.  If you do not have a primary care provider, please call [phone] and they will assist you.      [employer] is an electronic gateway that provides easy, online access to your medical records. With [employer], you can request a clinic appointment, read your test results, renew a prescription or communicate with your care team.     To access your existing account, please contact your [facility] or call [phone] for assistance.        Care EveryWhere ID     This is your Care EveryWhere ID. This could be used by other organizations to access your [place] medical records  [id number]         Blood Pressure from Last 3 Encounters:   10/09/18 140/68    10/02/18 141/53   09/26/18 109/56    Weight from Last 3 Encounters:   10/09/18 135.2 kg (298 lb)   10/02/18 133.8 kg (295 lb)   09/26/18 135.6 kg (299 lb)              We Performed the Following     EMG     HC NCS MOTOR W OR W/O F-WAVE, 5 OR 6          Today's Medication Changes          These changes are accurate as of 10/16/18  7:45 PM.  If you have any questions, ask your nurse or doctor.               These medicines have changed or have updated prescriptions.        Dose/Directions    acetaminophen 325 MG tablet   Commonly known as:  TYLENOL   This may have changed:  when to take this   Used for:  S/P CABG x 3, Acute post-operative pain        Dose:  650 mg   Take 2 tablets (650 mg) by mouth every 4 hours as needed for mild pain   Quantity:  100 tablet   Refills:  0       MedalogixCAN FINEPOINT LANCETS Misc   This may have changed:  additional instructions   Used for:  Type 2 diabetes mellitus with diabetic polyneuropathy, without long-term current use of insulin (H)        Use to test blood sugars 2 times daily or as directed.   Quantity:  100 each   Refills:  prn       ONE TOUCH ULTRA (DEVICES) Misc   This may have changed:  See the new instructions.   Used for:  Type II or unspecified type diabetes mellitus without mention of complication, not stated as uncontrolled        test blood sugar BID   Quantity:  1   Refills:  0       senna-docusate 8.6-50 MG per tablet   Commonly known as:  SENOKOT-S;PERICOLACE   This may have changed:  when to take this   Used for:  Atherosclerosis of native coronary artery without angina pectoris, unspecified whether native or transplanted heart        Dose:  1-2 tablet   Take 1-2 tablets by mouth 2 times daily as needed for constipation   Quantity:  30 tablet   Refills:  0                Primary Care Provider Office Phone # Fax #    Rafaela William [phone] [address]  [place] [address]        Goals        General    Pain Management  (pt-stated)     Notes - Note created  9/12/2018 11:30 AM by Senia Durán, RN    Goal Statement: I will decrease back pain   Measure of Success: Increased mobility with daily activity   Supportive Steps to Achieve:   I will start taking Tylenol 100 mg 3 times a day per Dr William   I will use heat ice and rest   Barriers: Deconditioned   Strengths: Continues home care services   Date to Achieve By: to be determined   Patient expressed understanding of goal: yes          Equal Access to Services     KJ SALVADOR AH: Hadii aad ku hadasho Soomaali, waaxda luqadaha, qaybta kaalmada adeegyada, waxay idiin haychris mcintyrekeraabran pena . So [facility] [phone].    ATENCIÓN: Si habla espdennise, tiene a gillespie disposición servicios gratuitos de asistencia lingüística. Llame al [phone].    We comply with applicable federal civil rights laws and [place] laws. We do not discriminate on the basis of race, color, national origin, age, disability, sex, sexual orientation, or gender identity.            Thank you!     Thank you for choosing [facility]  for your care. Our goal is always to provide you with excellent care. Hearing back from our patients is one way we can continue to improve our services. Please take a few minutes to complete the written survey that you may receive in the mail after your visit with us. Thank you!             Your Updated Medication List - Protect others around you: Learn how to safely use, store and throw away your medicines at www.disposemymeds.org.          This list is accurate as of 10/16/18  7:45 PM.  Always use your most recent med list.                   Brand Name Dispense Instructions for use Diagnosis    acetaminophen 325 MG tablet    TYLENOL    100 tablet    Take 2 tablets (650 mg) by mouth every 4 hours as needed for mild pain    S/P CABG x 3, Acute post-operative pain       albuterol 108 (90 Base) MCG/ACT inhaler    PROAIR HFA    1 Inhaler    Inhale 1-2 puffs into the lungs every 4  hours as needed for wheezing    Chronic obstructive pulmonary disease, unspecified COPD type (H)       aspirin 325 MG EC tablet     30 tablet    Take 1 tablet by mouth daily.        atorvastatin 40 MG tablet    LIPITOR    30 tablet    Take 1 tablet (40 mg) by mouth daily    Atherosclerosis of native coronary artery without angina pectoris, unspecified whether native or transplanted heart       * B-D U/F 31G X 5 MM   Generic drug:  insulin pen needle     100 each    USE FOR INSULIN INJECTION    Type 2 diabetes mellitus with diabetic polyneuropathy, without long-term current use of insulin (H)       * B-D U/F 31G X 8 MM   Generic drug:  insulin pen needle     100 each    USE FOR INSULIN INJECTION    Type 2 diabetes mellitus with stage 3 chronic kidney disease, with long-term current use of insulin (H)       blood glucose monitoring test strip    no brand specified    200 each    1 strip by In Vitro route 2 times daily Strips per patient's preference    Type 2 diabetes mellitus with diabetic polyneuropathy, without long-term current use of insulin (H)       bumetanide 2 MG tablet    BUMEX    60 tablet    Take 1 tablet (2 mg) by mouth 2 times daily Take once or twice daily, as directed by CORE clinic    Chronic diastolic heart failure (H)       clotrimazole 1 % cream    LOTRIMIN    60 g    Apply topically 2 times daily    Dermatitis       EPINEPHrine 0.3 MG/0.3ML injection 2-pack    EPIPEN/ADRENACLICK/or ANY BX GENERIC EQUIV    0.6 mL    Inject 0.3 mLs (0.3 mg) into the muscle once as needed for anaphylaxis    Allergic reaction, subsequent encounter       escitalopram 20 MG tablet    LEXAPRO    30 tablet    Take 1 tablet (20 mg) by mouth every morning    Recurrent major depressive disorder, in partial remission (H)       * febuxostat 40 MG Tabs tablet    ULORIC     Take 40 mg by mouth every evening        * ULORIC 40 MG Tabs tablet   Generic drug:  febuxostat     90 tablet    TAKE 1 TABLET(40 MG) BY MOUTH EVERY EVENING     Hyperuricemia       ferrous gluconate 324 (38 Fe) MG tablet    FERGON    36 tablet    Take 1 tablet (324 mg) by mouth Every Mon, Wed, Fri Morning    Chronic diastolic heart failure (H), Iron deficiency anemia secondary to inadequate dietary iron intake       FOLIC ACID PO      Take 1 mg by mouth daily        guaiFENesin 600 MG 12 hr tablet    MUCINEX     Take 1 tablet (600 mg) by mouth 2 times daily        insulin isophane human 100 UNIT/ML injection    HumuLIN N PEN    15 mL    Inject 35 Units Subcutaneous 2 times daily    Type 2 diabetes mellitus with stage 3 chronic kidney disease, with long-term current use of insulin (H)       insulin syringes (disposable) U-100 1 ML Misc     100 each    1 each 5 times daily    Type 2 diabetes mellitus with chronic kidney disease, without long-term current use of insulin, unspecified CKD stage (H)       levothyroxine 150 MCG tablet    SYNTHROID/LEVOTHROID    30 tablet    Take 1 tablet (150 mcg) by mouth daily    Other specified hypothyroidism       LIFESCAN FINEPOINT LANCETS Misc     100 each    Use to test blood sugars 2 times daily or as directed.    Type 2 diabetes mellitus with diabetic polyneuropathy, without long-term current use of insulin (H)       losartan 25 MG tablet    COZAAR    30 tablet    Take 1 tablet (25 mg) by mouth daily    Chronic diastolic heart failure (H)       Metoprolol Tartrate 75 MG Tabs     180 tablet    Take 75 mg by mouth 2 times daily    Atherosclerosis of native coronary artery without angina pectoris, unspecified whether native or transplanted heart       niacin 500 MG CR tablet    NIASPAN     Take 500 mg by mouth daily        nitroGLYcerin 0.4 MG sublingual tablet    NITROSTAT    25 tablet    For chest pain place 1 tablet under the tongue every 5 minutes for 3 doses. If symptoms persist 5 minutes after 1st dose call 911.    Atherosclerosis of native coronary artery without angina pectoris, unspecified whether native or transplanted heart        ONE TOUCH ULTRA (DEVICES) Misc     1    test blood sugar BID    Type II or unspecified type diabetes mellitus without mention of complication, not stated as uncontrolled       potassium chloride SA 10 MEQ CR tablet    K-DUR/KLOR-CON M    360 tablet    Take 2-4 tablets (20-40 mEq) by mouth 3 times daily As directed by CORE    Chronic diastolic heart failure (H)       pregabalin 100 MG capsule    LYRICA    90 capsule    Take 1 capsule (100 mg) by mouth 2 times daily    Pain in joint of left shoulder       repaglinide 1 MG tablet    PRANDIN    90 tablet    Take 1 tablet (1 mg) by mouth 3 times daily (before meals)    Type 2 diabetes mellitus with diabetic chronic kidney disease, unspecified CKD stage, unspecified long term insulin use status       senna-docusate 8.6-50 MG per tablet    SENOKOT-S;PERICOLACE    30 tablet    Take 1-2 tablets by mouth 2 times daily as needed for constipation    Atherosclerosis of native coronary artery without angina pectoris, unspecified whether native or transplanted heart       TIZANIDINE HCL PO      Take 4 mg by mouth At Bedtime        traZODone 50 MG tablet    DESYREL    270 tablet    Take 3 tablets (150 mg) by mouth At Bedtime    Recurrent major depressive disorder, in partial remission (H)       VITAMIN D (CHOLECALCIFEROL) PO      Take 10,000 Units by mouth daily        * Notice:  This list has 4 medication(s) that are the same as other medications prescribed for you. Read the directions carefully, and ask your doctor or other care provider to review them with you.

## 2018-10-16 NOTE — PROGRESS NOTES
[facility]  Electrodiagnostic Laboratory    Nerve Conduction & EMG Report          Patient:       Mariel Ribeiro  Patient ID:    [id number]  Gender:        Female  YOB: 1945  Age:           73 Years 4 Months      Referring Provider: Paula Hdz NP    History & Examination:    73 year old woman with history of type 2 diabetes, loss of sensation at her distal lower extremities, frequent syncopal episodes and leg weakness. Query polyneuropathy/myopathy.    Techniques: Motor conduction studies were done with surface recording electrodes. Sensory conduction studies were performed with surface electrodes, unless indicated otherwise by (n), designating the use of subdermal recording electrodes. Temperature was monitored and recorded throughout the study. Upper extremities were maintained at a temperature of 32 degrees Centigrade or higher.  Lower extremities were maintained at a temperature of 31degrees Centigrade or higher. EMG was done with a concentric needle electrode.     Results:    Right radial antidromic sensory NCS was normal. Right sural SNAP was absent. Right median motor NCS was normal. Right deep peroneal (recorded from EDB) and tibial motor NCSs showed normal distal latencies and markedly attenuated distal CMAP amplitudes. Right common peroneal motor NCS recorded from the tibialis anterior (TA) showed no response. Please note that lower extremity nerve conduction studies were very challenging due to the combination of severe leg edema and marked obesity, making proximal nerve stimulation nearly impossible. Right median F wave latencies were minimally prolonged. EMG of right tibialis anterior (TA) and peroneus tertius showed 3+ fibs/PSWs and moderately reduced recruitment of large, long duration, stable MUPs. EMG of right medial gastrocnemius, tibialis posterior, vastus lateralis, and semitendinosus was normal.    Interpretation:    Abnormal study. There is electrodiagnostic  evidence of:    1) Markedly attenuated lower extremity motor and sensory nerve responses. This may be attributed to technical factors, specifically severe leg edema and morbid obesity, making supramaximal nerve stimulation challenging, or to an axonal, length dependent sensorimotor polyneuropathy. Clinical correlation is necessary.  2) Denervation changes at the right lower extremity most consistent with a right deep or common peroneal mononeuropathy. A right L5 radiculopathy is less likely, but cannot be fully excluded, because the right gluteus medius and L5 paraspinal muscles were not studied (owing to technical difficulties sampling those muscles in a very obese patient).    There was no electrodiagnostic evidence of myopathy.     EMG Physician:    Hector Xiong MD       Sensory NCS      Nerve / Sites Rec. Site Onset Peak NP Amp Ref. PP Amp Dist Alan Ref. Temp     ms ms  V  V  V cm m/s m/s  C   R RADIAL - Snuff      Forearm Snuff 1.82 2.40 23.1 15.0 43.9 10 54.9 48.0 32.6   R SURAL - Lat Mall 60      Calf Ankle NR NR NR 5.0 NR 14 NR 38.0 31.6       Motor NCS      Nerve / Sites Rec. Site Lat Ref. Amp Ref. Rel Amp Dist Alan Ref. Dur. Area Temp.     ms ms mV mV % cm m/s m/s ms %  C   R MEDIAN - APB      Wrist APB 4.06 4.40 5.6 5.0 100 8   5.42 100 32.9      Elbow APB 8.91  5.0  90.4 23 47.5 48.0 6.09 90.9 32.7   R DEEP PERONEAL - EDB- Severe edema      Ankle EDB 4.64 6.00 0.4 2.5 100 8   6.51 100 31.2      FibHead EDB        38.0   31.3   R TIBIAL - AH- Severe edema      Ankle+ AH 4.64 6.00 1.3 4.0 100 8   4.95 100 31   R PERONEAL - Tib Ant- Severe edema      Fib Head Tib Ant NR  NR  NR    NR NR 31.3       F  Wave      Nerve Min F Lat Max F Lat Mean FLat Temp.    ms ms ms  C   R MEDIAN 30.78 36.93 32.76 32.4       EMG Summary Table     Spontaneous MUAP Recruitment    IA Fib/PSW Fasc H.F. Amp Dur. PPP Pattern   R. TIB ANTERIOR N 3+ None None 2+ 2+ N Moderately Reduced   R. GASTROCN (MED) N None None None N N N  N   R. VAST LATERALIS N None None None N N N N   R. SEMITENDIN N None None None N N N N   R. PERON TERTIUS N 3+ None None 2+ 2+ N Moderately Reduced   R. TIB POSTERIOR N None None None N N N N

## 2018-10-17 ENCOUNTER — TELEPHONE (OUTPATIENT)
Dept: NEUROLOGY | Facility: CLINIC | Age: 73
End: 2018-10-17

## 2018-10-17 NOTE — TELEPHONE ENCOUNTER
Paula Hdz APRN CNP  Carolann Ontiveros, RN                   Please let her know that I have her EMG results and they were abnormal.. She can see me sooner than 11/13 to go over EMG results. Thank you              Called and left a vm letting her know that Paula had her EMG results and she could schedule sooner that 11/13 if she would like.  I left our scheduling number.

## 2018-10-18 DIAGNOSIS — I50.32 CHRONIC DIASTOLIC HEART FAILURE (H): Chronic | ICD-10-CM

## 2018-10-18 LAB
ANION GAP SERPL CALCULATED.3IONS-SCNC: 6 MMOL/L (ref 3–14)
BUN SERPL-MCNC: 26 MG/DL (ref 7–30)
CALCIUM SERPL-MCNC: 9.5 MG/DL (ref 8.5–10.1)
CHLORIDE SERPL-SCNC: 106 MMOL/L (ref 94–109)
CO2 SERPL-SCNC: 29 MMOL/L (ref 20–32)
CREAT SERPL-MCNC: 1.41 MG/DL (ref 0.52–1.04)
GFR SERPL CREATININE-BSD FRML MDRD: 37 ML/MIN/1.7M2
GLUCOSE SERPL-MCNC: 73 MG/DL (ref 70–99)
POTASSIUM SERPL-SCNC: 4.2 MMOL/L (ref 3.4–5.3)
SODIUM SERPL-SCNC: 141 MMOL/L (ref 133–144)

## 2018-10-24 DIAGNOSIS — Z53.9 DIAGNOSIS NOT YET DEFINED: Primary | ICD-10-CM

## 2018-10-24 PROCEDURE — G0179 MD RECERTIFICATION HHA PT: HCPCS | Performed by: FAMILY MEDICINE

## 2018-10-24 PROCEDURE — G0179 MD RECERTIFICATION HHA PT: HCPCS | Mod: 59 | Performed by: FAMILY MEDICINE

## 2018-10-25 ENCOUNTER — RADIANT APPOINTMENT (OUTPATIENT)
Dept: MRI IMAGING | Facility: CLINIC | Age: 73
End: 2018-10-25
Attending: NURSE PRACTITIONER
Payer: MEDICARE

## 2018-10-25 DIAGNOSIS — R25.1 SPELLS OF TREMBLING: ICD-10-CM

## 2018-10-25 DIAGNOSIS — Z95.1 S/P CABG X 3: ICD-10-CM

## 2018-10-25 DIAGNOSIS — R55 SYNCOPE, UNSPECIFIED SYNCOPE TYPE: ICD-10-CM

## 2018-10-25 DIAGNOSIS — R13.10 SWALLOWING PROBLEM: ICD-10-CM

## 2018-10-26 ENCOUNTER — TELEPHONE (OUTPATIENT)
Dept: CARDIOLOGY | Facility: CLINIC | Age: 73
End: 2018-10-26

## 2018-10-26 NOTE — TELEPHONE ENCOUNTER
Discussed with Starla Saez NP then called JODY Russo back.  Mariel is post hospital discharge 6 weeks this week, but would not qualify for Cardiac Rehab due her EF is not lower than 35%.  I reviewed this with Rafaela and she will discuss with Mariel when she sees her today. They will continue PT and OT until Mariel meets her goals.   Serena Streeter RN

## 2018-10-26 NOTE — TELEPHONE ENCOUNTER
[facility] Call Center    Phone Message    May a detailed message be left on voicemail: yes    Reason for Call: Other: Pt's home care nurse, Rafaela, called to find out when this PT is supposed to be starting Cardiac Rehabilitation. She needs to discharge the pt from home care before then, because the pt's insurance won't cover home care if the pt is getting outpatient treatment.     Action Taken: Message routed to:  [facility] ([facility] Cardiology

## 2018-10-29 ENCOUNTER — TELEPHONE (OUTPATIENT)
Dept: FAMILY MEDICINE | Facility: CLINIC | Age: 73
End: 2018-10-29

## 2018-10-29 NOTE — TELEPHONE ENCOUNTER
Reason for Call: Request for an order or referral:    Order or referral being requested: 3 additional speech therapy visits.    Date needed: as soon as possible    Has the patient been seen by the PCP for this problem? YES    Additional comments:     Phone number Patient can be reached at:  Other phone number:  [phone]    Best Time:      Can we leave a detailed message on this number?  YES    Call taken on 10/29/2018 at 11:17 AM by Milagro Bocanegra

## 2018-10-30 ENCOUNTER — THERAPY VISIT (OUTPATIENT)
Dept: SLEEP MEDICINE | Facility: CLINIC | Age: 73
End: 2018-10-30
Payer: MEDICARE

## 2018-10-30 ENCOUNTER — DOCUMENTATION ONLY (OUTPATIENT)
Dept: SLEEP MEDICINE | Facility: CLINIC | Age: 73
End: 2018-10-30

## 2018-10-30 ENCOUNTER — DOCUMENTATION ONLY (OUTPATIENT)
Dept: CARE COORDINATION | Facility: CLINIC | Age: 73
End: 2018-10-30

## 2018-10-30 ENCOUNTER — HOSPITAL ENCOUNTER (OUTPATIENT)
Facility: CLINIC | Age: 73
Setting detail: SPECIMEN
Discharge: HOME | End: 2018-10-30
Admitting: INTERNAL MEDICINE
Payer: MEDICARE

## 2018-10-30 ENCOUNTER — TELEPHONE (OUTPATIENT)
Dept: SLEEP MEDICINE | Facility: CLINIC | Age: 73
End: 2018-10-30

## 2018-10-30 DIAGNOSIS — G47.33 OSA (OBSTRUCTIVE SLEEP APNEA): ICD-10-CM

## 2018-10-30 DIAGNOSIS — I50.32 CHRONIC DIASTOLIC HEART FAILURE (H): Chronic | ICD-10-CM

## 2018-10-30 DIAGNOSIS — G47.33 OSA (OBSTRUCTIVE SLEEP APNEA): Primary | ICD-10-CM

## 2018-10-30 LAB
BASE EXCESS BLDV CALC-SCNC: 4.6 MMOL/L
HCO3 BLDV-SCNC: 30 MMOL/L (ref 21–28)
O2/TOTAL GAS SETTING VFR VENT: 21 %
PCO2 BLDV: 49 MM HG (ref 40–50)
PH BLDV: 7.4 PH (ref 7.32–7.43)
PO2 BLDV: 44 MM HG (ref 25–47)

## 2018-10-30 PROCEDURE — 82803 BLOOD GASES ANY COMBINATION: CPT | Performed by: INTERNAL MEDICINE

## 2018-10-30 PROCEDURE — 95811 POLYSOM 6/>YRS CPAP 4/> PARM: CPT | Performed by: INTERNAL MEDICINE

## 2018-10-30 PROCEDURE — 36415 COLL VENOUS BLD VENIPUNCTURE: CPT | Performed by: INTERNAL MEDICINE

## 2018-10-30 NOTE — Clinical Note
Chuckie Pedraza,  I have generated a prescription for the patient to obtain a replacement device which will be a BiPAP device.  I left a voice message to the patient to take a look at the MY CHART message with the detailed sleep study report and  plan of care. Please call the patient to schedule the DME visit to obtain the BiPAP device  and patient will follow through the STM program.  Thank you very much, Noah Girard MD  of Medicine, Division of Pulmonary/Sleep Medicine [facility].

## 2018-10-30 NOTE — MR AVS SNAPSHOT
After Visit Summary   10/30/2018    Mariel Ribeiro    MRN: [id number]           Patient Information     Date Of Birth          1945        Visit Information        Provider Department      10/30/2018 8:00 PM SLEEP STUDY RM 1 [facility]        Today's Diagnoses     Chronic diastolic heart failure (H)        BELEN (obstructive sleep apnea)          Care Instructions    [facility]    1. Your sleep study will be reviewed by a sleep physician within the next few days.     2. Please follow up in the sleep clinic as scheduled, or, make an appointment with your sleep provider to be seen within two weeks to discuss the results of the sleep study.    3. If you have any questions or problems with your treatment plan, please contact your sleep clinic provider at [phone] to further manage your condition.    4. Please review your attached medication list, and, at your follow-up appointment advise your sleep clinic provider about any changes.    5. Go to [email or web address] for more information about your sleep problems.    ARCADIO Garcia  October 31, 2018                Follow-ups after your visit        Your next 10 appointments already scheduled     Nov 01, 2018 12:00 PM CDT   (Arrive by 11:45 AM)   In Lab Video Visit with  EEG TECH 2   EEG [facility] OUTPATIENT ([facility])    [address]  3rd Floor  [facility] [id number] [phone]            Nov 12, 2018 12:00 PM CST   Lab with  LAB   [facility] Lab ([facility])    [address]  1st Floor  [facility] [id number] [phone]            Nov 12, 2018 12:30 PM CST   (Arrive by 12:15 PM)   CORE RETURN with Starla Saez NP   [facility] ([facility])    [address]  [address]  [facility] [id number] [phone]            Nov 13, 2018  9:20 AM CST   Return  Sleep Patient with Noah Girard MD   [facility] ([facility])    [address] [place] [id number] [phone]            Nov 13, 2018 11:00 AM CST   (Arrive by 10:45 AM)   Return Visit with CHANTAL Soriano CNP   [facility] Neurology ([facility])    [address] [place] [id number] [phone]            Nov 29, 2018  2:00 PM CST   (Arrive by 1:45 PM)   RETURN DIABETES with BOBY [facility] Endocrinology ([facility])    [address] [place] [id number] [phone]            Jan 14, 2019  2:30 PM CST   (Arrive by 2:15 PM)   RETURN DIABETES with Keven Jaeger MD   [facility] Endocrinology ([facility])    [address] [place] [id number] [phone]              Who to contact     If you have questions or need follow up information about today's clinic visit or your schedule please contact [facility] directly at [phone].  Normal or non-critical lab and imaging results will be communicated to you by Matomy Markethart, letter or phone within 4 business days after the clinic has received the results. If you do not hear from us within 7 days, please contact the clinic through MyChart or phone. If you have a critical or abnormal lab result, we will notify you by phone as soon as possible.  Submit refill requests through [employer] or call your pharmacy and they will forward the refill request to us. Please allow 3 business days for your refill to be completed.          Additional Information About Your Visit        [employer] Information     [employer] gives you secure access to your electronic health record. If you see a primary care provider, you can also send messages to your care team and make  appointments. If you have questions, please call your primary care clinic.  If you do not have a primary care provider, please call [phone] and they will assist you.        Care EveryWhere ID     This is your Care EveryWhere ID. This could be used by other organizations to access your [place] medical records  [id number]         Blood Pressure from Last 3 Encounters:   10/09/18 140/68   10/02/18 141/53   09/26/18 109/56    Weight from Last 3 Encounters:   10/09/18 135.2 kg (298 lb)   10/02/18 133.8 kg (295 lb)   09/26/18 135.6 kg (299 lb)              We Performed the Following     Blood gas venous     Comprehensive Sleep Study          Today's Medication Changes          These changes are accurate as of 10/30/18 11:59 PM.  If you have any questions, ask your nurse or doctor.               These medicines have changed or have updated prescriptions.        Dose/Directions    acetaminophen 325 MG tablet   Commonly known as:  TYLENOL   This may have changed:  when to take this   Used for:  S/P CABG x 3, Acute post-operative pain        Dose:  650 mg   Take 2 tablets (650 mg) by mouth every 4 hours as needed for mild pain   Quantity:  100 tablet   Refills:  0       LIFESCAN FINEPOINT LANCETS Misc   This may have changed:  additional instructions   Used for:  Type 2 diabetes mellitus with diabetic polyneuropathy, without long-term current use of insulin (H)        Use to test blood sugars 2 times daily or as directed.   Quantity:  100 each   Refills:  prn       ONE TOUCH ULTRA (DEVICES) Misc   This may have changed:  See the new instructions.   Used for:  Type II or unspecified type diabetes mellitus without mention of complication, not stated as uncontrolled        test blood sugar BID   Quantity:  1   Refills:  0       senna-docusate 8.6-50 MG per tablet   Commonly known as:  SENOKOT-S;PERICOLACE   This may have changed:  when to take this   Used for:  Atherosclerosis of native coronary artery without angina  pectoris, unspecified whether native or transplanted heart        Dose:  1-2 tablet   Take 1-2 tablets by mouth 2 times daily as needed for constipation   Quantity:  30 tablet   Refills:  0                Primary Care Provider Office Phone # Fax #    Rafaela William [phone] [address] [place] [address]        Goals        General    Pain Management (pt-stated)     Notes - Note created  9/12/2018 11:30 AM by Senia Durán RN    Goal Statement: I will decrease back pain   Measure of Success: Increased mobility with daily activity   Supportive Steps to Achieve:   I will start taking Tylenol 100 mg 3 times a day per Dr William   I will use heat ice and rest   Barriers: Deconditioned   Strengths: Continues home care services   Date to Achieve By: to be determined   Patient expressed understanding of goal: yes          Equal Access to Services     KJ SALVADOR AH: Marcelina fowler [place], waaxda luqadaha, qaybta kaalmada brandon, gopal pena . So [facility] [phone].    ATENCIÓN: Si habla español, tiene a gillespie disposición servicios gratuitos de asistencia lingüística. Llame al [phone].    We comply with applicable federal civil rights laws and [place] laws. We do not discriminate on the basis of race, color, national origin, age, disability, sex, sexual orientation, or gender identity.            Thank you!     Thank you for choosing [facility]  for your care. Our goal is always to provide you with excellent care. Hearing back from our patients is one way we can continue to improve our services. Please take a few minutes to complete the written survey that you may receive in the mail after your visit with us. Thank you!             Your Updated Medication List - Protect others around you: Learn how to safely use, store and throw away your medicines at www.disposemymeds.org.          This list is accurate as of  10/30/18 11:59 PM.  Always use your most recent med list.                   Brand Name Dispense Instructions for use Diagnosis    acetaminophen 325 MG tablet    TYLENOL    100 tablet    Take 2 tablets (650 mg) by mouth every 4 hours as needed for mild pain    S/P CABG x 3, Acute post-operative pain       albuterol 108 (90 Base) MCG/ACT inhaler    PROAIR HFA    1 Inhaler    Inhale 1-2 puffs into the lungs every 4 hours as needed for wheezing    Chronic obstructive pulmonary disease, unspecified COPD type (H)       aspirin 325 MG EC tablet     30 tablet    Take 1 tablet by mouth daily.        atorvastatin 40 MG tablet    LIPITOR    30 tablet    Take 1 tablet (40 mg) by mouth daily    Atherosclerosis of native coronary artery without angina pectoris, unspecified whether native or transplanted heart       * B-D U/F 31G X 5 MM   Generic drug:  insulin pen needle     100 each    USE FOR INSULIN INJECTION    Type 2 diabetes mellitus with diabetic polyneuropathy, without long-term current use of insulin (H)       * B-D U/F 31G X 8 MM   Generic drug:  insulin pen needle     100 each    USE FOR INSULIN INJECTION    Type 2 diabetes mellitus with stage 3 chronic kidney disease, with long-term current use of insulin (H)       blood glucose monitoring test strip    no brand specified    200 each    1 strip by In Vitro route 2 times daily Strips per patient's preference    Type 2 diabetes mellitus with diabetic polyneuropathy, without long-term current use of insulin (H)       bumetanide 2 MG tablet    BUMEX    60 tablet    Take 1 tablet (2 mg) by mouth 2 times daily Take once or twice daily, as directed by CORE clinic    Chronic diastolic heart failure (H)       clotrimazole 1 % cream    LOTRIMIN    60 g    Apply topically 2 times daily    Dermatitis       EPINEPHrine 0.3 MG/0.3ML injection 2-pack    EPIPEN/ADRENACLICK/or ANY BX GENERIC EQUIV    0.6 mL    Inject 0.3 mLs (0.3 mg) into the muscle once as needed for anaphylaxis     Allergic reaction, subsequent encounter       escitalopram 20 MG tablet    LEXAPRO    30 tablet    Take 1 tablet (20 mg) by mouth every morning    Recurrent major depressive disorder, in partial remission (H)       * febuxostat 40 MG Tabs tablet    ULORIC     Take 40 mg by mouth every evening        * ULORIC 40 MG Tabs tablet   Generic drug:  febuxostat     90 tablet    TAKE 1 TABLET(40 MG) BY MOUTH EVERY EVENING    Hyperuricemia       ferrous gluconate 324 (38 Fe) MG tablet    FERGON    36 tablet    Take 1 tablet (324 mg) by mouth Every Mon, Wed, Fri Morning    Chronic diastolic heart failure (H), Iron deficiency anemia secondary to inadequate dietary iron intake       FOLIC ACID PO      Take 1 mg by mouth daily        guaiFENesin 600 MG 12 hr tablet    MUCINEX     Take 1 tablet (600 mg) by mouth 2 times daily        insulin isophane human 100 UNIT/ML injection    HumuLIN N PEN    15 mL    Inject 35 Units Subcutaneous 2 times daily    Type 2 diabetes mellitus with stage 3 chronic kidney disease, with long-term current use of insulin (H)       insulin syringes (disposable) U-100 1 ML Misc     100 each    1 each 5 times daily    Type 2 diabetes mellitus with chronic kidney disease, without long-term current use of insulin, unspecified CKD stage (H)       levothyroxine 150 MCG tablet    SYNTHROID/LEVOTHROID    30 tablet    Take 1 tablet (150 mcg) by mouth daily    Other specified hypothyroidism       QubulusCAN FINEPOINT LANCETS Misc     100 each    Use to test blood sugars 2 times daily or as directed.    Type 2 diabetes mellitus with diabetic polyneuropathy, without long-term current use of insulin (H)       losartan 25 MG tablet    COZAAR    30 tablet    Take 1 tablet (25 mg) by mouth daily    Chronic diastolic heart failure (H)       Metoprolol Tartrate 75 MG Tabs     180 tablet    Take 75 mg by mouth 2 times daily    Atherosclerosis of native coronary artery without angina pectoris, unspecified whether native or  transplanted heart       niacin 500 MG CR tablet    NIASPAN     Take 500 mg by mouth daily        nitroGLYcerin 0.4 MG sublingual tablet    NITROSTAT    25 tablet    For chest pain place 1 tablet under the tongue every 5 minutes for 3 doses. If symptoms persist 5 minutes after 1st dose call 911.    Atherosclerosis of native coronary artery without angina pectoris, unspecified whether native or transplanted heart       ONE TOUCH ULTRA (DEVICES) Misc     1    test blood sugar BID    Type II or unspecified type diabetes mellitus without mention of complication, not stated as uncontrolled       potassium chloride SA 10 MEQ CR tablet    K-DUR/KLOR-CON M    360 tablet    Take 2-4 tablets (20-40 mEq) by mouth 3 times daily As directed by CORE    Chronic diastolic heart failure (H)       pregabalin 100 MG capsule    LYRICA    90 capsule    Take 1 capsule (100 mg) by mouth 2 times daily    Pain in joint of left shoulder       repaglinide 1 MG tablet    PRANDIN    90 tablet    Take 1 tablet (1 mg) by mouth 3 times daily (before meals)    Type 2 diabetes mellitus with diabetic chronic kidney disease, unspecified CKD stage, unspecified long term insulin use status       senna-docusate 8.6-50 MG per tablet    SENOKOT-S;PERICOLACE    30 tablet    Take 1-2 tablets by mouth 2 times daily as needed for constipation    Atherosclerosis of native coronary artery without angina pectoris, unspecified whether native or transplanted heart       TIZANIDINE HCL PO      Take 4 mg by mouth At Bedtime        traZODone 50 MG tablet    DESYREL    270 tablet    Take 3 tablets (150 mg) by mouth At Bedtime    Recurrent major depressive disorder, in partial remission (H)       VITAMIN D (CHOLECALCIFEROL) PO      Take 10,000 Units by mouth daily        * Notice:  This list has 4 medication(s) that are the same as other medications prescribed for you. Read the directions carefully, and ask your doctor or other care provider to review them with you.

## 2018-10-30 NOTE — PROGRESS NOTES
[facility] now requests orders and shares plan of care/discharge summaries for some patients through [employer].  Please REPLY TO THIS MESSAGE OR ROUTE BACK TO THE AUTHOR in order to give authorization for orders when needed.  This is considered a verbal order, you will still receive a faxed copy of orders for signature.  Thank you for your assistance in improving collaboration for our patients.    ORDER: Continued home PT 1w3 to progress activity tolerance, gait training, and fall prevention to promote community mobility.    Rossi Lundberg, PT, DPT  [email or web address]  [phone]

## 2018-10-31 NOTE — PROGRESS NOTES
Diagnostic PSG completed per provider order.  Patient met criteria for PAP therapy.  transcutaneous C02 monitoring

## 2018-10-31 NOTE — PATIENT INSTRUCTIONS
[place] [facility]    1. Your sleep study will be reviewed by a sleep physician within the next few days.     2. Please follow up in the sleep clinic as scheduled, or, make an appointment with your sleep provider to be seen within two weeks to discuss the results of the sleep study.    3. If you have any questions or problems with your treatment plan, please contact your sleep clinic provider at [phone] to further manage your condition.    4. Please review your attached medication list, and, at your follow-up appointment advise your sleep clinic provider about any changes.    5. Go to [email or web address] for more information about your sleep problems.    Rafi Ferrer, RPSGT  October 31, 2018

## 2018-11-01 ENCOUNTER — OFFICE VISIT (OUTPATIENT)
Dept: NEUROLOGY | Facility: CLINIC | Age: 73
End: 2018-11-01
Attending: NURSE PRACTITIONER
Payer: MEDICARE

## 2018-11-01 ENCOUNTER — PATIENT OUTREACH (OUTPATIENT)
Dept: CARE COORDINATION | Facility: CLINIC | Age: 73
End: 2018-11-01

## 2018-11-01 ENCOUNTER — TELEPHONE (OUTPATIENT)
Dept: SLEEP MEDICINE | Facility: CLINIC | Age: 73
End: 2018-11-01

## 2018-11-01 DIAGNOSIS — R55 SYNCOPE, UNSPECIFIED SYNCOPE TYPE: Primary | ICD-10-CM

## 2018-11-01 LAB — SLPCOMP: NORMAL

## 2018-11-01 NOTE — TELEPHONE ENCOUNTER
I called patient today 11/01/18 to schedule a replacement pap machine setup with her. Patient asked me about the machines and I let her know that Dr. Fields order her to be on the Bipap machine. Patient said if it is the same one they put on her during the sleep study it is not comfortable because she felt like their was air forcing into her. I let her know if she does not feel comfortable to get set up on the Bipap yet she can see Dr. Fields first and then I can set her up after she gets her results. Patient is ok with that she has her appointment with Dr. Fields on 11/13/18.

## 2018-11-01 NOTE — PROGRESS NOTES
Clinic Care Coordination Contact    Clinic Care Coordination Contact  OUTREACH    Referral Information:            Chief Complaint   Patient presents with     Clinic Care Coordination - Follow-up     Clinic Care Coordination RN         [place] Utilization:  [facility] admission 9/9-9/10/2018-  Discharge Diagnoses         Acute midline low back pain, with sciatica presence unspecified  Diabetic hypoglycemia (H)  Encounter for long-term (current) use of insulin (H)  Falls frequently  Generalized muscle weakness  Type 2 diabetes mellitus with stage 3 chronic kidney disease, with long-term current use of insulin (H)     Utilization    Last refreshed: 10/31/2018 10:47 AM:  No Show Count (past year) 3       Last refreshed: 10/31/2018 10:47 AM:  ED visits 1       Last refreshed: 10/31/2018 10:47 AM:  Hospital admissions 5          Current as of: 10/31/2018 10:47 AM             Clinical Concerns:  Current Medical Concerns:  Patient reports she will continue home care PT until the end of the month.  Patients goal is to walk her dog outside.   Patient is using a rolling walker,Tylenol,Ice/heat and wearing back brace to decrease back pain.  Patient states no change in her back pain   Patient is able to walk her house which is 60 feet 3 times before she needs to rest .  Continues to check weight daily and stable within 1 pound  Average 294#   Blood sugars are tested 4 times a day and averaging below 100  Patient has Neurology ,Endocrinology, Cardiology  and sleep clinic future appointments scheduled         Health Maintenance Reviewed:    Clinical Pathway: None    Medication Management:  Not discussed today     Functional Status: uses a rolling walker        Living Situation: Lives with a roommate        Goals:   Goals        General    Pain Management (pt-stated)     Notes - Note edited  11/1/2018 10:27 AM by Senia Durán, JODY    Goal Statement: I will decrease back pain   Measure of Success: Increased  mobility with daily activity   Supportive Steps to Achieve:   I will start taking Tylenol 100 mg 3 times a day per Dr William   I will use heat ice and rest   I will use my back brace   I will use my rolling walker   Barriers: Deconditioned   Strengths: Continues home care services   Date to Achieve By: to be determined   Patient expressed understanding of goal: yes                Future Appointments              Today  EEG TECH 2 EEG [facility] OUTPATIENT, [facility]    In 1 week  LAB [facility] Lab, [facility]    In 1 week Starla Saez NP [facility] Heart Care, [facility]    In 1 week Noah Girard MD [facility]    In 1 week Paula Hdz APRN CNP [facility] Neurology, [facility]    In 4 weeks Odalis Medley PA-C [facility] Endocrinology, [facility]    In 2 months Keven Jaeger MD [facility] Endocrinology, [facility]          Plan: CC will follow up in 2-4 weeks    Senia Durán RN / Clinical Care Coordinator     [facility]   [email or web address]  Office :  [phone] / Fax :  [phone]
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
## 2018-11-01 NOTE — MR AVS SNAPSHOT
After Visit Summary   11/1/2018    Mariel Ribeiro    MRN: [id number]           Patient Information     Date Of Birth          1945        Visit Information        Provider Department      11/1/2018 12:00 PM [facility] EEG TECH 2 EEG [facility] OUTPATIENT        Today's Diagnoses     Syncope, unspecified syncope type    -  1       Follow-ups after your visit        Your next 10 appointments already scheduled     Nov 12, 2018 12:00 PM CST   Lab with UC LAB   [facility] Lab ([facility])    [address] [place] [id number] [phone]            Nov 12, 2018 12:30 PM CST   (Arrive by 12:15 PM)   CORE RETURN with Starla Saez NP   [facility] Heart Care ([facility])    [address] [place] [address] [phone]            Nov 13, 2018  9:20 AM CST   Return Sleep Patient with Noah Girard MD   [facility] ([facility])    [address] [place] [address] [phone]            Nov 13, 2018 11:00 AM CST   (Arrive by 10:45 AM)   Return Visit with CHANTAL Soriano CNP   [facility] Neurology ([facility])    [address] [place] [id number] [phone]            Nov 29, 2018  2:00 PM CST   (Arrive by 1:45 PM)   RETURN DIABETES with Odalis Medley PA-C   [facility] Endocrinology ([facility])    [address] [place] [address] [phone]            Jan 14, 2019  2:30 PM CST   (Arrive by 2:15 PM)   RETURN DIABETES with Keven Jaeger MD   [facility] Endocrinology ([facility])    [address] [place] [address] [phone]              Who to contact     Please call your clinic at [phone] to:    Ask questions  about your health    Make or cancel appointments    Discuss your medicines    Learn about your test results    Speak to your doctor            Additional Information About Your Visit        Elancehar[employer] Information     Cellay gives you secure access to your electronic health record. If you see a primary care provider, you can also send messages to your care team and make appointments. If you have questions, please call your primary care clinic.  If you do not have a primary care provider, please call [phone] and they will assist you.      Cellay is an electronic gateway that provides easy, online access to your medical records. With Cellay, you can request a clinic appointment, read your test results, renew a prescription or communicate with your care team.     To access your existing account, please contact your [facility] or call [phone] for assistance.        Care EveryWhere ID     This is your Care EveryWhere ID. This could be used by other organizations to access your [facility] medical records  [id number]         Blood Pressure from Last 3 Encounters:   No data found for BP    Weight from Last 3 Encounters:   No data found for Wt              Today, you had the following     No orders found for display         Today's Medication Changes          These changes are accurate as of 11/1/18 11:59 PM.  If you have any questions, ask your nurse or doctor.               These medicines have changed or have updated prescriptions.        Dose/Directions    acetaminophen 325 MG tablet   Commonly known as:  TYLENOL   This may have changed:  when to take this   Used for:  S/P CABG x 3, Acute post-operative pain        Dose:  650 mg   Take 2 tablets (650 mg) by mouth every 4 hours as needed for mild pain   Quantity:  100 tablet   Refills:  0       [employer] FINEPOINT LANCETS Misc   This may have changed:  additional instructions   Used for:  Type 2 diabetes mellitus with diabetic  polyneuropathy, without long-term current use of insulin (H)        Use to test blood sugars 2 times daily or as directed.   Quantity:  100 each   Refills:  prn       ONE TOUCH ULTRA (DEVICES) Misc   This may have changed:  See the new instructions.   Used for:  Type II or unspecified type diabetes mellitus without mention of complication, not stated as uncontrolled        test blood sugar BID   Quantity:  1   Refills:  0       senna-docusate 8.6-50 MG per tablet   Commonly known as:  SENOKOT-S;PERICOLACE   This may have changed:  when to take this   Used for:  Atherosclerosis of native coronary artery without angina pectoris, unspecified whether native or transplanted heart        Dose:  1-2 tablet   Take 1-2 tablets by mouth 2 times daily as needed for constipation   Quantity:  30 tablet   Refills:  0                Primary Care Provider Office Phone # Fax #    Rafaela William [phone] [address] [place] [address]        Goals        General    Pain Management (pt-stated)     Notes - Note edited  11/1/2018 10:27 AM by Senia Durán, RN    Goal Statement: I will decrease back pain   Measure of Success: Increased mobility with daily activity   Supportive Steps to Achieve:   I will start taking Tylenol 100 mg 3 times a day per Dr William   I will use heat ice and rest   I will use my back brace   I will use my rolling walker   Barriers: Deconditioned   Strengths: Continues home care services   Date to Achieve By: to be determined   Patient expressed understanding of goal: yes          Equal Access to Services     BRANDON SALVADOR AH: Hadii aad ku hadasho Soedgardali, waaxda luqadaha, qaybta kaalmada adeegyada, waxay yoni haychris pena . So [facility] [phone].    ATENCIÓN: Si habla español, tiene a gillespie disposición servicios gratuitos de asistencia lingüística. Llame al [phone].    We comply with applicable federal civil rights laws and [place] laws. We do not  discriminate on the basis of race, color, national origin, age, disability, sex, sexual orientation, or gender identity.            Thank you!     Thank you for choosing [facility] OUT[facility]  for your care. Our goal is always to provide you with excellent care. Hearing back from our patients is one way we can continue to improve our services. Please take a few minutes to complete the written survey that you may receive in the mail after your visit with us. Thank you!             Your Updated Medication List - Protect others around you: Learn how to safely use, store and throw away your medicines at www.disposemymeds.org.          This list is accurate as of 11/1/18 11:59 PM.  Always use your most recent med list.                   Brand Name Dispense Instructions for use Diagnosis    acetaminophen 325 MG tablet    TYLENOL    100 tablet    Take 2 tablets (650 mg) by mouth every 4 hours as needed for mild pain    S/P CABG x 3, Acute post-operative pain       albuterol 108 (90 Base) MCG/ACT inhaler    PROAIR HFA    1 Inhaler    Inhale 1-2 puffs into the lungs every 4 hours as needed for wheezing    Chronic obstructive pulmonary disease, unspecified COPD type (H)       aspirin 325 MG EC tablet     30 tablet    Take 1 tablet by mouth daily.        atorvastatin 40 MG tablet    LIPITOR    30 tablet    Take 1 tablet (40 mg) by mouth daily    Atherosclerosis of native coronary artery without angina pectoris, unspecified whether native or transplanted heart       * B-D U/F 31G X 5 MM   Generic drug:  insulin pen needle     100 each    USE FOR INSULIN INJECTION    Type 2 diabetes mellitus with diabetic polyneuropathy, without long-term current use of insulin (H)       * B-D U/F 31G X 8 MM   Generic drug:  insulin pen needle     100 each    USE FOR INSULIN INJECTION    Type 2 diabetes mellitus with stage 3 chronic kidney disease, with long-term current use of insulin (H)       blood glucose monitoring test strip    no brand  specified    200 each    1 strip by In Vitro route 2 times daily Strips per patient's preference    Type 2 diabetes mellitus with diabetic polyneuropathy, without long-term current use of insulin (H)       bumetanide 2 MG tablet    BUMEX    60 tablet    Take 1 tablet (2 mg) by mouth 2 times daily Take once or twice daily, as directed by [facility] clinic    Chronic diastolic heart failure (H)       clotrimazole 1 % cream    LOTRIMIN    60 g    Apply topically 2 times daily    Dermatitis       EPINEPHrine 0.3 MG/0.3ML injection 2-pack    EPIPEN/ADRENACLICK/or ANY BX GENERIC EQUIV    0.6 mL    Inject 0.3 mLs (0.3 mg) into the muscle once as needed for anaphylaxis    Allergic reaction, subsequent encounter       escitalopram 20 MG tablet    LEXAPRO    30 tablet    Take 1 tablet (20 mg) by mouth every morning    Recurrent major depressive disorder, in partial remission (H)       * febuxostat 40 MG Tabs tablet    ULORIC     Take 40 mg by mouth every evening        * ULORIC 40 MG Tabs tablet   Generic drug:  febuxostat     90 tablet    TAKE 1 TABLET(40 MG) BY MOUTH EVERY EVENING    Hyperuricemia       ferrous gluconate 324 (38 Fe) MG tablet    FERGON    36 tablet    Take 1 tablet (324 mg) by mouth Every Mon, Wed, Fri Morning    Chronic diastolic heart failure (H), Iron deficiency anemia secondary to inadequate dietary iron intake       FOLIC ACID PO      Take 1 mg by mouth daily        guaiFENesin 600 MG 12 hr tablet    MUCINEX     Take 1 tablet (600 mg) by mouth 2 times daily        insulin isophane human 100 UNIT/ML injection    HumuLIN N PEN    15 mL    Inject 35 Units Subcutaneous 2 times daily    Type 2 diabetes mellitus with stage 3 chronic kidney disease, with long-term current use of insulin (H)       insulin syringes (disposable) U-100 1 ML Misc     100 each    1 each 5 times daily    Type 2 diabetes mellitus with chronic kidney disease, without long-term current use of insulin, unspecified CKD stage (H)        levothyroxine 150 MCG tablet    SYNTHROID/LEVOTHROID    30 tablet    Take 1 tablet (150 mcg) by mouth daily    Other specified hypothyroidism       [employer] FINEPOINT LANCETS Misc     100 each    Use to test blood sugars 2 times daily or as directed.    Type 2 diabetes mellitus with diabetic polyneuropathy, without long-term current use of insulin (H)       losartan 25 MG tablet    COZAAR    30 tablet    Take 1 tablet (25 mg) by mouth daily    Chronic diastolic heart failure (H)       Metoprolol Tartrate 75 MG Tabs     180 tablet    Take 75 mg by mouth 2 times daily    Atherosclerosis of native coronary artery without angina pectoris, unspecified whether native or transplanted heart       niacin 500 MG CR tablet    NIASPAN     Take 500 mg by mouth daily        nitroGLYcerin 0.4 MG sublingual tablet    NITROSTAT    25 tablet    For chest pain place 1 tablet under the tongue every 5 minutes for 3 doses. If symptoms persist 5 minutes after 1st dose call 911.    Atherosclerosis of native coronary artery without angina pectoris, unspecified whether native or transplanted heart       ONE TOUCH ULTRA (DEVICES) Misc     1    test blood sugar BID    Type II or unspecified type diabetes mellitus without mention of complication, not stated as uncontrolled       potassium chloride SA 10 MEQ CR tablet    K-DUR/KLOR-CON M    360 tablet    Take 2-4 tablets (20-40 mEq) by mouth 3 times daily As directed by [facility]    Chronic diastolic heart failure (H)       pregabalin 100 MG capsule    LYRICA    90 capsule    Take 1 capsule (100 mg) by mouth 2 times daily    Pain in joint of left shoulder       repaglinide 1 MG tablet    PRANDIN    90 tablet    Take 1 tablet (1 mg) by mouth 3 times daily (before meals)    Type 2 diabetes mellitus with diabetic chronic kidney disease, unspecified CKD stage, unspecified long term insulin use status       senna-docusate 8.6-50 MG per tablet    SENOKOT-S;PERICOLACE    30 tablet    Take 1-2 tablets by  mouth 2 times daily as needed for constipation    Atherosclerosis of native coronary artery without angina pectoris, unspecified whether native or transplanted heart       TIZANIDINE HCL PO      Take 4 mg by mouth At Bedtime        traZODone 50 MG tablet    DESYREL    270 tablet    Take 3 tablets (150 mg) by mouth At Bedtime    Recurrent major depressive disorder, in partial remission (H)       VITAMIN D (CHOLECALCIFEROL) PO      Take 10,000 Units by mouth daily        * Notice:  This list has 4 medication(s) that are the same as other medications prescribed for you. Read the directions carefully, and ask your doctor or other care provider to review them with you.

## 2018-11-01 NOTE — LETTER
11/1/2018     RE: Mariel Ribeiro  [address] [place] [address]     Dear Colleague,    Thank you for referring your patient, Mariel Ribeiro, to the EEG [facility] OUTPATIENT at [facility]. Please see a copy of my visit note below.    CPT: 91541 mod 52  CSC/OP/3 hr Video EEG  Reading MD: Saeid    Again, thank you for allowing me to participate in the care of your patient.      Sincerely,    EEG Tech

## 2018-11-01 NOTE — Clinical Note
OP 3hr vEEG will be ready to be read after 1500 on 11/1. EEG was ordered by Paula Hdz and pt has follow-up appt with her on 11/13 to go over the results.

## 2018-11-01 NOTE — PROCEDURES
" SLEEP STUDY INTERPRETATION  SPLIT NIGHT STUDY      Patient: ROSY PALMER  YOB: 1945  Study Date: 10/30/2018  MRN: [id number]  Referring Provider: -  Ordering Provider: Santa Girard MD    Indications for Polysomnography: The patient is a 73 y old Female who is 5' 6\" and weighs 299.0 lbs. Her BMI is 48.6, Gentry sleepiness scale 14.0 and neck circumference is 47.0 cm. Relevant medical history includes Hypothyroidism, HTN, DM Type II, CAD s/p PCIx2, CKD Stage III, BELEN diagnosed in 1999, Fibromyalgia, thyroid CA, Gout, GERD, recurrent syncope, COPD, and Hyperlipidemia.  She has not been using her CPAP device for the past 6 months to 1 year and is now interested in obtaining a new replacement CPAP device to restart treatment. She has lost significant weight since the last PSG obtained in 2009 from 340 pounds then to 299 pounds. A diagnostic polysomnogram was performed to re-evaluate for previously diagnosed sleep related breathing disorder. After 158.5 minutes of sleep time the patient exhibited sufficient respiratory events qualifying her for a CPAP trial which was then initiated.    Polysomnogram Data: A full night polysomnogram recorded the standard physiologic parameters including EEG, EOG, EMG, ECG, nasal and oral airflow. Respiratory parameters of chest and abdominal movements were recorded with respiratory inductance plethysmography. Oxygen saturation was recorded by pulse oximetry.  Hypopnea scoring rule used: 1B 4%    Diagnostic PSG  Sleep Architecture: normal sleep efficiency. All sleep stages were seen with increased amount of time spent in stages N3 and REM sleep, suggestive of probable sleep deprivation.  The total recording time of the polysomnogram was 182.4 minutes. The total sleep time was 158.5 minutes. Sleep latency was normal at 21.9 minutes without the use of a sleep aid. REM latency was 65.5 minutes. Arousal index was normal at 7.9 arousals per hour. Sleep efficiency " was normal at 86.9%. Wake after sleep onset was - minutes. The patient spent 1.6% of total sleep time in Stage N1, 43.2% in Stage N2, 25.6% in Stage N3, and 29.7% in REM. Time in REM supine was 47.0 minutes.    Respiration: severe obstructive sleep apnea, pronounced during REM sleep with sleep associated hypoxemia. There was no evidence of sustained sleep associated hypoventilation.    Events ? The polysomnogram revealed a presence of 8 obstructive, 4 central, and - mixed apneas resulting in an apnea index of 4.5 events per hour. There were 76 obstructive hypopneas and - central hypopneas resulting in an obstructive hypopnea index of 28.8 and central hypopnea index of - events per hour. The combined apnea/hypopnea index was 33.3 events per hour (central apnea/hypopnea index was 1.5 events per hour).  The REM AHI was 91.9 events per hour. The supine AHI was 33.3 events per hour. The RERA index was 0.4 events per hour. The RDI was 33.7 events per hour.    Snoring - was reported as loud.    Respiratory rate and pattern - was notable for normal respiratory rate and pattern.    Sustained Sleep Associated Hypoventilation - Transcutaneous carbon dioxide monitoring was used, however significant hypoventilation was not present with a maximum change from 43.0 to 50.0 mmHg and 0 minutes at or greater than 55 mmHg.    Sleep Associated Hypoxemia - (Greater than 5 minutes O2 sat at or below 88%) was present. Baseline oxygen saturation was 87.4%. Lowest oxygen saturation was 62.7%. Time spent less than or equal to 88% was 49.6 minutes. Time spent less than or equal to 89% was 62.4 minutes.     Treatment PSG  Sleep Architecture: normal except absent stage N3 sleep.  At 01:31:49 AM the patient was placed on PAP treatment. The total recording time of the treatment portion of the study was 293.1 minutes. The total sleep time was 272.5 minutes. During the treatment portion of the study the sleep latency was 0.0 minutes. REM latency was  60.0 minutes. Arousal index was normal at 5.5 arousals per hour. Sleep efficiency was improved at 93.0%. Wake after sleep onset was 20.5 minutes. The patient spent 3.1% of total sleep time in Stage N1, 63.9% in Stage N2, 0.0% in Stage N3, and 33.0% in REM. Time in REM supine was 90.0 minutes.     Respiration: optimum titration was achieved with Bi-level PAP device  CPAP was titrated initially at pressures ranging from CPAP 5 cmH2O up to 8 cm water but due to persistent disordered breathing events and hypoxemia, the device was switched to Bi-Level. With Bi-Level at pressure settings at 16/8/0 cmH2O there was significant improvement in disordered breathing events including resolution of hypoxemia.  The final pressure was Bi-Level 16/8/0 cmH2O with an AHI of 4.6 events per hour. Time in REM supine on final pressure was 5.0 minutes.   This titration was considered Optimal (residual AHI < 5 events per hour and including REM?supine sleep at final pressure).     Movement Activity: there were no periodic limb movements or abnormal sleep related behaviors    Periodic Limb Movements  o During the diagnostic portion of the study, there were - PLMs recorded. The PLM index was - movements per hour. The PLM Arousal Index was - per hour.  o During the treatment portion of the study, there were - PLMs recorded. The PLM index was - movements per hour. The PLM Arousal Index was - per hour.    REM EMG Activity - Excessive transient/sustained muscle activity was not present.    Nocturnal Behavior - Abnormal sleep related behaviors were not noted during/arising out of NREM / REM sleep.      Bruxism - None apparent.    Cardiac Summary: Normal sinus rhythm was seen with few PVCs  During the diagnostic portion of the study, the average pulse rate was 67.1 bpm. The minimum pulse rate was 55.1 bpm while the maximum pulse rate was 86.1 bpm.    During the treatment portion of the study, the average pulse rate was 66.7 bpm. The minimum pulse  rate was 54.9 bpm while the maximum pulse rate was 83.0 bpm.     Normal sinus rhythm was seen with few PVCs. There were no significant arrhythmias.      Assessment:     Severe obstructive sleep apnea, pronounced during REM sleep with sleep associated hypoxemia. There was no evidence of sustained sleep associated hypoventilation.     Optimum titration was achieved with Bi-level PAP device with pressure setting at 16/8 cm water using full face mask.    Sleep architecture during baseline was remarkable for normal sleep efficiency. All sleep stages were seen with increased amount of time spent in stages N3 and REM sleep, suggestive of probable sleep deprivation. With CPAP treatment, sleep architecture normal except absent stage N3 sleep.      There were no periodic limb movements or abnormal sleep related behaviors.    Normal sinus rhythm was seen with few PVCs.    Recommendations:    Treatment of BELEN with Bi-level PAP device with pressure setting at 16/8 cm water using full face mask. Recommend clinical follow up with sleep management team, for coaching and review of effectiveness and compliance measures.    Advice regarding the risks of drowsy driving.    Suggest optimizing sleep schedule and avoiding sleep deprivation.    Weight management (if BMI > 30).    Pharmacologic therapy should be used for management of restless legs syndrome only if present and clinically indicated and not based on the presence of periodic limb movements alone.    Diagnostic Codes:   Obstructive Sleep Apnea G47.33  Sleep Hypoxemia/Hypoventilation G47.36       10/30/2018 [facility] Sleep Study (299.0 lbs) - AHI 33.3, RDI 33.7, Supine AHI 33.3, REM AHI 91.9, Low O2% 62.7%, Time Spent ?88% 49.6, Time Spent ?89% 62.4. Treatment was titrated to a pressure of Bi-level 16/8/0 with an AHI 4.6. Time spent in REM supine at this pressure was 5.0 minutes.      _____________________________________   Electronically Signed By: Oziel  MD Era), 11/1/2018

## 2018-11-04 ENCOUNTER — PRE VISIT (OUTPATIENT)
Dept: CARDIOLOGY | Facility: CLINIC | Age: 73
End: 2018-11-04

## 2018-11-04 DIAGNOSIS — I50.30 (HFPEF) HEART FAILURE WITH PRESERVED EJECTION FRACTION (H): Primary | ICD-10-CM

## 2018-11-11 NOTE — PROCEDURES
Procedure Date: 2018      EEG #:        DATE OF RECORDING/SERVICE DATE:  2018      DURATION OF RECORDIN hours and 34 minutes.      CLINICAL HISTORY:  This patient is a 73-year-old right-handed female who presented with altered mental status spells.  EEG was requested for evaluation for seizures versus encephalopathy.      CURRENT MEDICATIONS:  Lyrica, Lexapro, Bumex, Nitrostat, trazodone.      TECHNICAL SUMMARY: This continuous video- EEG monitoring procedure was performed with 23 scalp electrodes in 10-20 electrode system placements, and additional scalp, precordial and other surface electrodes used for electrical referencing and artifact detection.  Video monitoring was utilized and periodically reviewed by EEG technologists and the physician for electroclinical correlations.    RESULTS:   BACKGROUND ACTIVITIES: During maximal wakefulness, there is a symmetric, moderate amplitude, well formed, approximately 9 Hz posterior dominant rhythm, which attenuated with eye opening. Stage I and II sleep were recorded with well formed sleep architectures including vertex sharp transients and sleep spindles. Hyperventilation and Photic stimulation were not performed.  INTERICTAL ACTIVITIES: There are no focal pathological slowing or epileptiform abnormalities.   ICTAL ACTIVITIES: There are no clinical or electrographic seizures during this recording.  IMPRESSION:  This is a normal waking and sleep EEG.          CHARLOTTE RAYGOZA MD             D: 2018   T: 2018   MT: PATRIZIA      Name:     ROSY PALMER   MRN:      [id number]        Account:        [id number]   :      1945           Procedure Date: 2018      Document: [id number]

## 2018-11-12 ENCOUNTER — OFFICE VISIT (OUTPATIENT)
Dept: CARDIOLOGY | Facility: CLINIC | Age: 73
End: 2018-11-12
Attending: NURSE PRACTITIONER
Payer: MEDICARE

## 2018-11-12 VITALS
HEART RATE: 52 BPM | HEIGHT: 66 IN | WEIGHT: 293 LBS | SYSTOLIC BLOOD PRESSURE: 123 MMHG | BODY MASS INDEX: 47.09 KG/M2 | DIASTOLIC BLOOD PRESSURE: 48 MMHG | OXYGEN SATURATION: 98 %

## 2018-11-12 DIAGNOSIS — Z95.1 S/P CABG (CORONARY ARTERY BYPASS GRAFT): Primary | ICD-10-CM

## 2018-11-12 DIAGNOSIS — I50.30 (HFPEF) HEART FAILURE WITH PRESERVED EJECTION FRACTION (H): ICD-10-CM

## 2018-11-12 DIAGNOSIS — I50.32 CHRONIC DIASTOLIC HEART FAILURE (H): Chronic | ICD-10-CM

## 2018-11-12 LAB
ANION GAP SERPL CALCULATED.3IONS-SCNC: 5 MMOL/L (ref 3–14)
BUN SERPL-MCNC: 31 MG/DL (ref 7–30)
CALCIUM SERPL-MCNC: 9.4 MG/DL (ref 8.5–10.1)
CHLORIDE SERPL-SCNC: 107 MMOL/L (ref 94–109)
CO2 SERPL-SCNC: 29 MMOL/L (ref 20–32)
CREAT SERPL-MCNC: 1.53 MG/DL (ref 0.52–1.04)
GFR SERPL CREATININE-BSD FRML MDRD: 33 ML/MIN/1.7M2
GLUCOSE SERPL-MCNC: 50 MG/DL (ref 70–99)
POTASSIUM SERPL-SCNC: 4 MMOL/L (ref 3.4–5.3)
SODIUM SERPL-SCNC: 141 MMOL/L (ref 133–144)

## 2018-11-12 PROCEDURE — G0463 HOSPITAL OUTPT CLINIC VISIT: HCPCS | Mod: ZF

## 2018-11-12 PROCEDURE — 99214 OFFICE O/P EST MOD 30 MIN: CPT | Mod: ZP | Performed by: NURSE PRACTITIONER

## 2018-11-12 PROCEDURE — 36415 COLL VENOUS BLD VENIPUNCTURE: CPT | Performed by: NURSE PRACTITIONER

## 2018-11-12 PROCEDURE — 80048 BASIC METABOLIC PNL TOTAL CA: CPT | Performed by: NURSE PRACTITIONER

## 2018-11-12 RX ORDER — BUMETANIDE 2 MG/1
2 TABLET ORAL DAILY
Qty: 90 TABLET | Refills: 3 | Status: SHIPPED | OUTPATIENT
Start: 2018-11-12 | End: 2018-12-12

## 2018-11-12 ASSESSMENT — PAIN SCALES - GENERAL: PAINLEVEL: NO PAIN (0)

## 2018-11-12 NOTE — MR AVS SNAPSHOT
After Visit Summary   11/12/2018    Mariel Palmer    MRN: [id number]           Patient Information     Date Of Birth          1945        Visit Information        Provider Department      11/12/2018 12:30 PM Starla Saez NP [facility]        Today's Diagnoses     S/P CABG (coronary artery bypass graft)    -  1    Chronic diastolic heart failure (H)          Care Instructions    You were seen today in the Cardiovascular Clinic at the [facility].     Cardiology Providers you saw during your visit: Starla Saez NP     1. Decrease Bumex to 2mg daily. Can take an extra Bumex 1mg as needed for weight gain and shortness-of-breath - but call us if you're needing more than 2 days in a row of this extra.  2.  We will be ordering Cardiac Rehab.  You do not qualify for Cardiac Rehab for Heart Failure because of your ejection fraction isn't low enough.  But you will qualify for post heart surgery from your CABG 07/2018.  3. Please make a follow-up CORE/heart failure appt in 1 month with labs prior.    4. Please make a follow-up heart failure appt with Dr. Wolf in 4 months with labs prior.        Results for MARIEL PALMER (MRN [id number]) as of 11/12/2018 13:07   Ref. Range 11/12/2018 12:20   Sodium Latest Ref Range: 133 - 144 mmol/L 141   Potassium Latest Ref Range: 3.4 - 5.3 mmol/L 4.0   Chloride Latest Ref Range: 94 - 109 mmol/L 107   Carbon Dioxide Latest Ref Range: 20 - 32 mmol/L 29   Urea Nitrogen Latest Ref Range: 7 - 30 mg/dL 31 (H)   Creatinine Latest Ref Range: 0.52 - 1.04 mg/dL 1.53 (H)   GFR Estimate Latest Ref Range: >60 mL/min/1.7m2 33 (L)   GFR Estimate If Black Latest Ref Range: >60 mL/min/1.7m2 40 (L)   Calcium Latest Ref Range: 8.5 - 10.1 mg/dL 9.4   Anion Gap Latest Ref Range: 3 - 14 mmol/L 5   Glucose Latest Ref Range: 70 - 99 mg/dL 50 (LL)       Please limit your fluid intake to 2 L (64 ounces) daily.  2 Liters a day = 8.5 cups, or 64 ounces.  Please  "limit your salt intake to 2 grams a day or less.     If you gain 2# in 24 hours or 5# in one week call Serena Streeter RN so we can adjust your medications as needed over the phone.     Please feel free to call me with any questions or concerns.       Serena Streeter RN BSN CHFN  [facility]  Cardiology Care Coordinator-Heart Failure Clinic     Questions and [phone].   First press #1 for the University and then press #3 for \"Medical Questions\" to reach us Cardiology Nurses.      On Call Cardiologist for after hours or on weekends: [phone]   option #4 and ask to speak to the on-call Cardiologist. Inform them you are a CORE/heart failure patient at the [facility].           If you need a medication refill please contact your pharmacy.  Please allow 3 business days for your refill to be completed.  _______________________________________________________  C.O.R.E. CLINIC Cardiomyopathy, Optimization, Rehabilitation, Education   The C.O.R.E. CLINIC is a heart failure specialty clinic within the [facility] where you will work with specialized nurse practitioners dedicated to helping patients with heart failure carefully adjust medications, receive education, and learn who and when to call if symptoms develop. They specialize in helping you better understand your condition, slow the progression of your disease, improve the length and quality of your life, help you detect future heart problems before they become life threatening, and avoid hospitalizations.  As always, thank you for trusting us with your health care needs!           Follow-ups after your visit        Additional Services     CARDIAC REHAB REFERRAL       If you have not heard from the scheduling office within 2 business days, please call [phone] for all locations, with the exception of [place], please call [phone] and [place], please call [phone].    Please " be aware that coverage of these services is subject to the terms and limitations of your health insurance plan.  Call member services at your health plan with any benefit or coverage questions.            Follow-Up with CORE Clinic       Return CORE in 1 month w/labs prior            Follow-Up with Advanced Heart Failure Cardiologist       Return HF with Luciano in 4 months w/labs prior                  Your next 10 appointments already scheduled     Nov 13, 2018  9:20 AM CST   Return Sleep Patient with Noah Girard MD   [facility] ([facility])    [address] [place] [address]   [phone]            Nov 13, 2018 11:00 AM CST   (Arrive by 10:45 AM)   Return Visit with CHANTAL Soriano CNP   [facility] ([facility])    [address] [place] [address]   [phone]            Nov 29, 2018  2:00 PM CST   (Arrive by 1:45 PM)   RETURN DIABETES with Odalis Medley PA-C   [facility] ([facility])    [address] [place] [address]  3rd [facility] [address]   [phone]            Dec 10, 2018 12:00 PM CST   Lab with UC LAB   [facility] ([facility])    [address] [place] [address]   [phone]            Dec 10, 2018 12:30 PM CST   (Arrive by 12:15 PM)   CORE RETURN with Starla Saez NP   [facility] ([facility])    [address] [place] [address]   [phone]            Jan 14, 2019  2:30 PM CST   (Arrive by 2:15 PM)   RETURN DIABETES with Keven Jaeger MD   [facility] ([facility])    [address] [place] [address]   [phone]            Mar 13, 2019  2:30 PM CDT   Lab with  LAB     [facility] ([facility])    [address]  1st Floor  [facility] [address] [phone]            Mar 13, 2019  3:00 PM CDT   (Arrive by 2:45 PM)   RETURN HEART FAILURE with Stephanie Wolf MD   [facility] ([facility])    [address]  [address]  [facility] [address] [phone]              Future tests that were ordered for you today     Open Future Orders        Priority Expected Expires Ordered    Follow-Up with Advanced Heart Failure Cardiologist Routine 3/6/2019 5/17/2019 11/12/2018    Follow-Up with CORE Clinic Routine 12/10/2018 2/17/2019 11/12/2018    CARDIAC REHAB REFERRAL Routine  11/12/2019 11/12/2018            Who to contact     If you have questions or need follow up information about today's clinic visit or your schedule please contact [facility] directly at [phone].  Normal or non-critical lab and imaging results will be communicated to you by [employer], letter or phone within 4 business days after the clinic has received the results. If you do not hear from us within 7 days, please contact the clinic through [employer] or phone. If you have a critical or abnormal lab result, we will notify you by phone as soon as possible.  Submit refill requests through [employer] or call your pharmacy and they will forward the refill request to us. Please allow 3 business days for your refill to be completed.          Additional Information About Your Visit        [employer] Information     [employer] gives you secure access to your electronic health record. If you see a primary care provider, you can also send messages to your care team and make appointments. If you have questions, please call your primary care clinic.  If you do not have a primary care provider, please call [phone] and they will assist you.        Care EveryWhere ID     This is your Care EveryWhere ID. This could be used by other organizations to  "access your [facility] medical records  [id number]        Your Vitals Were     Pulse Height Pulse Oximetry BMI (Body Mass Index)          52 1.676 m (5' 6\") 98% 48.42 kg/m2         Blood Pressure from Last 3 Encounters:   11/12/18 123/48   10/09/18 140/68   10/02/18 141/53    Weight from Last 3 Encounters:   11/12/18 136.1 kg (300 lb)   10/09/18 135.2 kg (298 lb)   10/02/18 133.8 kg (295 lb)              We Performed the Following     Follow-Up with [facility]          Today's Medication Changes          These changes are accurate as of 11/12/18  2:01 PM.  If you have any questions, ask your nurse or doctor.               These medicines have changed or have updated prescriptions.        Dose/Directions    acetaminophen 325 MG tablet   Commonly known as:  TYLENOL   This may have changed:  when to take this   Used for:  S/P CABG x 3, Acute post-operative pain        Dose:  650 mg   Take 2 tablets (650 mg) by mouth every 4 hours as needed for mild pain   Quantity:  100 tablet   Refills:  0       bumetanide 2 MG tablet   Commonly known as:  BUMEX   This may have changed:    - when to take this  - additional instructions   Used for:  Chronic diastolic heart failure (H)   Changed by:  Starla Saez NP        Dose:  2 mg   Take 1 tablet (2 mg) by mouth daily May take 1mg extra as directed by [facility].   Quantity:  90 tablet   Refills:  3       LIFESCAN FINEPOINT LANCETS Misc   This may have changed:  additional instructions   Used for:  Type 2 diabetes mellitus with diabetic polyneuropathy, without long-term current use of insulin (H)        Use to test blood sugars 2 times daily or as directed.   Quantity:  100 each   Refills:  prn       ONE TOUCH ULTRA (DEVICES) Misc   This may have changed:  See the new instructions.   Used for:  Type II or unspecified type diabetes mellitus without mention of complication, not stated as uncontrolled        test blood sugar BID   Quantity:  1   Refills:  0       " senna-docusate 8.6-50 MG per tablet   Commonly known as:  SENOKOT-S;PERICOLACE   This may have changed:  when to take this   Used for:  Atherosclerosis of native coronary artery without angina pectoris, unspecified whether native or transplanted heart        Dose:  1-2 tablet   Take 1-2 tablets by mouth 2 times daily as needed for constipation   Quantity:  30 tablet   Refills:  0            Where to get your medicines      These medications were sent to [facility] [id number] - [place], [place] - [address] AT [facility] [address]  [address], [place] [address]    Hours:  24-hours Phone:  [phone]     bumetanide 2 MG tablet                Primary Care Provider Office Phone # Fax #    Rafaela William [phone] [address]  [place] [address]        Goals        General    Pain Management (pt-stated)     Notes - Note edited  11/1/2018 10:27 AM by Senia Durán RN    Goal Statement: I will decrease back pain   Measure of Success: Increased mobility with daily activity   Supportive Steps to Achieve:   I will start taking Tylenol 100 mg 3 times a day per Dr William   I will use heat ice and rest   I will use my back brace   I will use my rolling walker   Barriers: Deconditioned   Strengths: Continues home care services   Date to Achieve By: to be determined   Patient expressed understanding of goal: yes          Equal Access to Services     [facility] AH: Hadii von ku hadasho Soedgardali, waaxda luqadaha, qaybta kaalmada adegladysyada, gopal vallejo haychirs pena . So [facility] [phone].    ATENCIÓN: Si habla español, tiene a gillespie disposición servicios gratuitos de asistencia lingüística. Hebert al [phone].    We comply with applicable federal civil rights laws and [place] laws. We do not discriminate on the basis of race, color, national origin, age, disability, sex, sexual orientation, or gender identity.            Thank you!      Thank you for choosing [facility]  for your care. Our goal is always to provide you with excellent care. Hearing back from our patients is one way we can continue to improve our services. Please take a few minutes to complete the written survey that you may receive in the mail after your visit with us. Thank you!             Your Updated Medication List - Protect others around you: Learn how to safely use, store and throw away your medicines at www.disposemymeds.org.          This list is accurate as of 11/12/18  2:01 PM.  Always use your most recent med list.                   Brand Name Dispense Instructions for use Diagnosis    acetaminophen 325 MG tablet    TYLENOL    100 tablet    Take 2 tablets (650 mg) by mouth every 4 hours as needed for mild pain    S/P CABG x 3, Acute post-operative pain       albuterol 108 (90 Base) MCG/ACT inhaler    PROAIR HFA    1 Inhaler    Inhale 1-2 puffs into the lungs every 4 hours as needed for wheezing    Chronic obstructive pulmonary disease, unspecified COPD type (H)       aspirin 325 MG EC tablet     30 tablet    Take 1 tablet by mouth daily.        atorvastatin 40 MG tablet    LIPITOR    30 tablet    Take 1 tablet (40 mg) by mouth daily    Atherosclerosis of native coronary artery without angina pectoris, unspecified whether native or transplanted heart       * B-D U/F 31G X 5 MM   Generic drug:  insulin pen needle     100 each    USE FOR INSULIN INJECTION    Type 2 diabetes mellitus with diabetic polyneuropathy, without long-term current use of insulin (H)       * B-D U/F 31G X 8 MM   Generic drug:  insulin pen needle     100 each    USE FOR INSULIN INJECTION    Type 2 diabetes mellitus with stage 3 chronic kidney disease, with long-term current use of insulin (H)       blood glucose monitoring test strip    no brand specified    200 each    1 strip by In Vitro route 2 times daily Strips per patient's preference    Type 2 diabetes mellitus with diabetic  polyneuropathy, without long-term current use of insulin (H)       bumetanide 2 MG tablet    BUMEX    90 tablet    Take 1 tablet (2 mg) by mouth daily May take 1mg extra as directed by CORE clinic.    Chronic diastolic heart failure (H)       clotrimazole 1 % cream    LOTRIMIN    60 g    Apply topically 2 times daily    Dermatitis       EPINEPHrine 0.3 MG/0.3ML injection 2-pack    EPIPEN/ADRENACLICK/or ANY BX GENERIC EQUIV    0.6 mL    Inject 0.3 mLs (0.3 mg) into the muscle once as needed for anaphylaxis    Allergic reaction, subsequent encounter       escitalopram 20 MG tablet    LEXAPRO    30 tablet    Take 1 tablet (20 mg) by mouth every morning    Recurrent major depressive disorder, in partial remission (H)       * febuxostat 40 MG Tabs tablet    ULORIC     Take 40 mg by mouth every evening        * ULORIC 40 MG Tabs tablet   Generic drug:  febuxostat     90 tablet    TAKE 1 TABLET(40 MG) BY MOUTH EVERY EVENING    Hyperuricemia       ferrous gluconate 324 (38 Fe) MG tablet    FERGON    36 tablet    Take 1 tablet (324 mg) by mouth Every Mon, Wed, Fri Morning    Chronic diastolic heart failure (H), Iron deficiency anemia secondary to inadequate dietary iron intake       FOLIC ACID PO      Take 1 mg by mouth daily        guaiFENesin 600 MG 12 hr tablet    MUCINEX     Take 1 tablet (600 mg) by mouth 2 times daily        insulin isophane human 100 UNIT/ML injection    HumuLIN N PEN    15 mL    Inject 35 Units Subcutaneous 2 times daily    Type 2 diabetes mellitus with stage 3 chronic kidney disease, with long-term current use of insulin (H)       insulin syringes (disposable) U-100 1 ML Misc     100 each    1 each 5 times daily    Type 2 diabetes mellitus with chronic kidney disease, without long-term current use of insulin, unspecified CKD stage (H)       levothyroxine 150 MCG tablet    SYNTHROID/LEVOTHROID    30 tablet    Take 1 tablet (150 mcg) by mouth daily    Other specified hypothyroidism       WorkMeInCAN  FINEPOINT LANCETS Misc     100 each    Use to test blood sugars 2 times daily or as directed.    Type 2 diabetes mellitus with diabetic polyneuropathy, without long-term current use of insulin (H)       losartan 25 MG tablet    COZAAR    30 tablet    Take 1 tablet (25 mg) by mouth daily    Chronic diastolic heart failure (H)       Metoprolol Tartrate 75 MG Tabs     180 tablet    Take 75 mg by mouth 2 times daily    Atherosclerosis of native coronary artery without angina pectoris, unspecified whether native or transplanted heart       niacin 500 MG CR tablet    NIASPAN     Take 500 mg by mouth daily        nitroGLYcerin 0.4 MG sublingual tablet    NITROSTAT    25 tablet    For chest pain place 1 tablet under the tongue every 5 minutes for 3 doses. If symptoms persist 5 minutes after 1st dose call 911.    Atherosclerosis of native coronary artery without angina pectoris, unspecified whether native or transplanted heart       ONE TOUCH ULTRA (DEVICES) Misc     1    test blood sugar BID    Type II or unspecified type diabetes mellitus without mention of complication, not stated as uncontrolled       potassium chloride SA 10 MEQ CR tablet    K-DUR/KLOR-CON M    360 tablet    Take 2-4 tablets (20-40 mEq) by mouth 3 times daily As directed by CORE    Chronic diastolic heart failure (H)       pregabalin 100 MG capsule    LYRICA    90 capsule    Take 1 capsule (100 mg) by mouth 2 times daily    Pain in joint of left shoulder       repaglinide 1 MG tablet    PRANDIN    90 tablet    Take 1 tablet (1 mg) by mouth 3 times daily (before meals)    Type 2 diabetes mellitus with diabetic chronic kidney disease, unspecified CKD stage, unspecified long term insulin use status       senna-docusate 8.6-50 MG per tablet    SENOKOT-S;PERICOLACE    30 tablet    Take 1-2 tablets by mouth 2 times daily as needed for constipation    Atherosclerosis of native coronary artery without angina pectoris, unspecified whether native or transplanted  heart       TIZANIDINE HCL PO      Take 4 mg by mouth At Bedtime        traZODone 50 MG tablet    DESYREL    270 tablet    Take 3 tablets (150 mg) by mouth At Bedtime    Recurrent major depressive disorder, in partial remission (H)       VITAMIN D (CHOLECALCIFEROL) PO      Take 10,000 Units by mouth daily        * Notice:  This list has 4 medication(s) that are the same as other medications prescribed for you. Read the directions carefully, and ask your doctor or other care provider to review them with you.

## 2018-11-12 NOTE — NURSING NOTE
Vitals completed successfully and medication reconciled. Divina Luciano mA    Chief Complaint   Patient presents with     Follow Up For     Return CORE; 73 year old with chronic diastolic heart failure and recent hospitalization presents for follow-up with labs prior.
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
## 2018-11-12 NOTE — PATIENT INSTRUCTIONS
You were seen today in the Cardiovascular Clinic at the [facility].     Cardiology Providers you saw during your visit: Starla Saez NP     1. Decrease Bumex to 2mg daily. Can take an extra Bumex 1mg as needed for weight gain and shortness-of-breath - but call us if you're needing more than 2 days in a row of this extra.  2.  We will be ordering Cardiac Rehab.  You do not qualify for Cardiac Rehab for Heart Failure because of your ejection fraction isn't low enough.  But you will qualify for post heart surgery from your CABG 2018.  3. Please make a follow-up CORE/heart failure appt in 1 month with labs prior.    4. Please make a follow-up heart failure appt with Dr. Wolf in 4 months with labs prior.        Results for ROSY PALMER (MRN [id number]) as of 2018 13:07   Ref. Range 2018 12:20   Sodium Latest Ref Range: 133 - 144 mmol/L 141   Potassium Latest Ref Range: 3.4 - 5.3 mmol/L 4.0   Chloride Latest Ref Range: 94 - 109 mmol/L 107   Carbon Dioxide Latest Ref Range: 20 - 32 mmol/L 29   Urea Nitrogen Latest Ref Range: 7 - 30 mg/dL 31 (H)   Creatinine Latest Ref Range: 0.52 - 1.04 mg/dL 1.53 (H)   GFR Estimate Latest Ref Range: >60 mL/min/1.7m2 33 (L)   GFR Estimate If Black Latest Ref Range: >60 mL/min/1.7m2 40 (L)   Calcium Latest Ref Range: 8.5 - 10.1 mg/dL 9.4   Anion Gap Latest Ref Range: 3 - 14 mmol/L 5   Glucose Latest Ref Range: 70 - 99 mg/dL 50 (LL)       Please limit your fluid intake to 2 L (64 ounces) daily.  2 Liters a day = 8.5 cups, or 64 ounces.  Please limit your salt intake to 2 grams a day or less.     If you gain 2# in 24 hours or 5# in one week call Serena Streeter RN so we can adjust your medications as needed over the phone.     Please feel free to call me with any questions or concerns.       Serena Streeter RN BSN CHFN  [facility]  Cardiology Care Coordinator-Heart Failure Clinic     Questions and [phone].   First  "press #1 for the University and then press #3 for \"Medical Questions\" to reach us Cardiology Nurses.      On Call Cardiologist for after hours or on weekends: [phone]   option #4 and ask to speak to the on-call Cardiologist. Inform them you are a CORE/heart failure patient at the [facility].           If you need a medication refill please contact your pharmacy.  Please allow 3 business days for your refill to be completed.  _______________________________________________________  [facility]IN[facility] Cardiomyopathy, Optimization, Rehabilitation, Education   The [facility].R.E[facility] CLINIC is a heart failure specialty clinic within the [facility] where you will work with specialized nurse practitioners dedicated to helping patients with heart failure carefully adjust medications, receive education, and learn who and when to call if symptoms develop. They specialize in helping you better understand your condition, slow the progression of your disease, improve the length and quality of your life, help you detect future heart problems before they become life threatening, and avoid hospitalizations.  As always, thank you for trusting us with your health care needs!   "

## 2018-11-13 ENCOUNTER — OFFICE VISIT (OUTPATIENT)
Dept: SLEEP MEDICINE | Facility: CLINIC | Age: 73
End: 2018-11-13
Payer: MEDICARE

## 2018-11-13 ENCOUNTER — OFFICE VISIT (OUTPATIENT)
Dept: NEUROLOGY | Facility: CLINIC | Age: 73
End: 2018-11-13
Payer: MEDICARE

## 2018-11-13 ENCOUNTER — TELEPHONE (OUTPATIENT)
Dept: SLEEP MEDICINE | Facility: CLINIC | Age: 73
End: 2018-11-13

## 2018-11-13 VITALS
HEART RATE: 61 BPM | BODY MASS INDEX: 47.09 KG/M2 | SYSTOLIC BLOOD PRESSURE: 129 MMHG | OXYGEN SATURATION: 98 % | DIASTOLIC BLOOD PRESSURE: 63 MMHG | HEIGHT: 66 IN | WEIGHT: 293 LBS | RESPIRATION RATE: 18 BRPM

## 2018-11-13 VITALS
OXYGEN SATURATION: 98 % | HEIGHT: 66 IN | WEIGHT: 293 LBS | SYSTOLIC BLOOD PRESSURE: 129 MMHG | BODY MASS INDEX: 47.09 KG/M2 | HEART RATE: 61 BPM | DIASTOLIC BLOOD PRESSURE: 63 MMHG

## 2018-11-13 DIAGNOSIS — G47.33 OSA (OBSTRUCTIVE SLEEP APNEA): Primary | ICD-10-CM

## 2018-11-13 DIAGNOSIS — R55 SYNCOPE, UNSPECIFIED SYNCOPE TYPE: ICD-10-CM

## 2018-11-13 DIAGNOSIS — G57.31 PERONEAL NEUROPATHY, RIGHT: ICD-10-CM

## 2018-11-13 DIAGNOSIS — G62.89 AXONAL SENSORIMOTOR NEUROPATHY: Primary | ICD-10-CM

## 2018-11-13 DIAGNOSIS — Z86.39 H/O DIABETES MELLITUS: ICD-10-CM

## 2018-11-13 PROCEDURE — 99214 OFFICE O/P EST MOD 30 MIN: CPT | Performed by: INTERNAL MEDICINE

## 2018-11-13 ASSESSMENT — PAIN SCALES - GENERAL: PAINLEVEL: SEVERE PAIN (7)

## 2018-11-13 NOTE — MR AVS SNAPSHOT
After Visit Summary   11/13/2018    Mariel Ribeiro    MRN: [id number]           Patient Information     Date Of Birth          1945        Visit Information        Provider Department      11/13/2018 11:00 AM Paula Hdz APRN [facility] Neurology        Care Instructions    Plan:  Recommended not to put pressure below the knee. Call if your develop a weakness in the right leg.   Use walker or cane for stability  Return if you develop any new weakness in your legs or other parts of your body.   Return if involuntary movements are more frequent and loss of consciousness or any other seizure like activity  Stroke prevention-control of diabetes, lipids control thru PCP. Activity as tolerated.   Work with PCP on way to lose weight for stroke prevention.   Use CPAP and see if helps with the symptoms.   Follow up as needed if getting worse-weakness, gait problems, resting tremor or other new symptoms.                 Follow-ups after your visit        Your next 10 appointments already scheduled     Nov 29, 2018  2:00 PM CST   (Arrive by 1:45 PM)   RETURN DIABETES with Odalis Medley PA-C   [facility] Endocrinology ([facility])    [address] [place] [id number] [phone]            Dec 10, 2018 12:00 PM CST   Lab with  LAB   [facility] Lab ([facility])    [address] [place] [id number] [phone]            Dec 10, 2018 12:30 PM CST   (Arrive by 12:15 PM)   CORE RETURN with Starla Saez NP   [facility] Heart Care ([facility])    [address] [place] [address] [id number] [phone]            Jan 14, 2019  2:30 PM CST   (Arrive by 2:15 PM)   RETURN DIABETES with Keven Jaeger MD   [facility] Endocrinology ([facility])    [address] [place], [place] [id number]    [phone]            Mar 13, 2019  2:30 PM CDT   Lab with  LAB   [facility] Lab ([facility])    [address]  1st Floor  [facility] [id number] [phone]            Mar 13, 2019  3:00 PM CDT   (Arrive by 2:45 PM)   RETURN HEART FAILURE with Stephanie Wolf MD   [facility] Heart Care ([facility])    [address]  [address]  [facility] [id number] [phone]              Future tests that were ordered for you today     Open Future Orders        Priority Expected Expires Ordered    Overnight oximetry study Routine  5/12/2019 11/13/2018    Follow-Up with Advanced Heart Failure Cardiologist Routine 3/6/2019 5/17/2019 11/12/2018    Follow-Up with [facility] Routine 12/10/2018 2/17/2019 11/12/2018    CARDIAC REHAB REFERRAL Routine  11/12/2019 11/12/2018            Who to contact     Please call your clinic at [phone] to:    Ask questions about your health    Make or cancel appointments    Discuss your medicines    Learn about your test results    Speak to your doctor            Additional Information About Your Visit        [employer] Information     [employer] gives you secure access to your electronic health record. If you see a primary care provider, you can also send messages to your care team and make appointments. If you have questions, please call your primary care clinic.  If you do not have a primary care provider, please call [phone] and they will assist you.      [employer] is an electronic gateway that provides easy, online access to your medical records. With [employer], you can request a clinic appointment, read your test results, renew a prescription or communicate with your care team.     To access your existing account, please contact your [facility] or call [phone] for assistance.        Care EveryWhere ID     This is your Care EveryWhere ID. This could be used by other organizations  "to access your [facility] medical records  [id number]        Your Vitals Were     Pulse Height Pulse Oximetry BMI (Body Mass Index)          61 1.676 m (5' 6\") 98% 48.26 kg/m2         Blood Pressure from Last 3 Encounters:   11/13/18 129/63   11/13/18 129/63   11/12/18 123/48    Weight from Last 3 Encounters:   11/13/18 135.6 kg (299 lb)   11/13/18 135.6 kg (299 lb)   11/12/18 136.1 kg (300 lb)              Today, you had the following     No orders found for display         Today's Medication Changes          These changes are accurate as of 11/13/18 12:14 PM.  If you have any questions, ask your nurse or doctor.               These medicines have changed or have updated prescriptions.        Dose/Directions    acetaminophen 325 MG tablet   Commonly known as:  TYLENOL   This may have changed:  when to take this   Used for:  S/P CABG x 3, Acute post-operative pain        Dose:  650 mg   Take 2 tablets (650 mg) by mouth every 4 hours as needed for mild pain   Quantity:  100 tablet   Refills:  0       LIFESCAN FINEPOINT LANCETS Misc   This may have changed:  additional instructions   Used for:  Type 2 diabetes mellitus with diabetic polyneuropathy, without long-term current use of insulin (H)        Use to test blood sugars 2 times daily or as directed.   Quantity:  100 each   Refills:  prn       ONE TOUCH ULTRA (DEVICES) Misc   This may have changed:  See the new instructions.   Used for:  Type II or unspecified type diabetes mellitus without mention of complication, not stated as uncontrolled        test blood sugar BID   Quantity:  1   Refills:  0       senna-docusate 8.6-50 MG per tablet   Commonly known as:  SENOKOT-S;PERICOLACE   This may have changed:  when to take this   Used for:  Atherosclerosis of native coronary artery without angina pectoris, unspecified whether native or transplanted heart        Dose:  1-2 tablet   Take 1-2 tablets by mouth 2 times daily as needed for constipation   Quantity:  30 tablet "   Refills:  0                Primary Care Provider Office Phone # Fax #    Rafaela William [phone]       [address]  [place] [address]        Goals        General    Pain Management (pt-stated)     Notes - Note edited  11/1/2018 10:27 AM by Senia Durán, JODY    Goal Statement: I will decrease back pain   Measure of Success: Increased mobility with daily activity   Supportive Steps to Achieve:   I will start taking Tylenol 100 mg 3 times a day per Dr William   I will use heat ice and rest   I will use my back brace   I will use my rolling walker   Barriers: Deconditioned   Strengths: Continues home care services   Date to Achieve By: to be determined   Patient expressed understanding of goal: yes          Equal Access to Services     KJ SALVADOR : Marcelina villelao [place], waaxda luqadaha, qaybta kaalmada adeegyada, gopal pena . [facility] [phone].    ATENCIÓN: Si habla español, tiene a gillespie disposición servicios gratuitos de asistencia lingüística. [facility] [phone].    We comply with applicable federal civil rights laws and [place] laws. We do not discriminate on the basis of race, color, national origin, age, disability, sex, sexual orientation, or gender identity.            Thank you!     Thank you for choosing [facility]UROLOG[facility]  for your care. Our goal is always to provide you with excellent care. Hearing back from our patients is one way we can continue to improve our services. Please take a few minutes to complete the written survey that you may receive in the mail after your visit with us. Thank you!             Your Updated Medication List - Protect others around you: Learn how to safely use, store and throw away your medicines at www.disposemymeds.org.          This list is accurate as of 11/13/18 12:14 PM.  Always use your most recent med list.                   Brand Name Dispense Instructions for use Diagnosis     acetaminophen 325 MG tablet    TYLENOL    100 tablet    Take 2 tablets (650 mg) by mouth every 4 hours as needed for mild pain    S/P CABG x 3, Acute post-operative pain       albuterol 108 (90 Base) MCG/ACT inhaler    PROAIR HFA    1 Inhaler    Inhale 1-2 puffs into the lungs every 4 hours as needed for wheezing    Chronic obstructive pulmonary disease, unspecified COPD type (H)       aspirin 325 MG EC tablet     30 tablet    Take 1 tablet by mouth daily.        atorvastatin 40 MG tablet    LIPITOR    30 tablet    Take 1 tablet (40 mg) by mouth daily    Atherosclerosis of native coronary artery without angina pectoris, unspecified whether native or transplanted heart       * B-D U/F 31G X 5 MM   Generic drug:  insulin pen needle     100 each    USE FOR INSULIN INJECTION    Type 2 diabetes mellitus with diabetic polyneuropathy, without long-term current use of insulin (H)       * B-D U/F 31G X 8 MM   Generic drug:  insulin pen needle     100 each    USE FOR INSULIN INJECTION    Type 2 diabetes mellitus with stage 3 chronic kidney disease, with long-term current use of insulin (H)       blood glucose monitoring test strip    no brand specified    200 each    1 strip by In Vitro route 2 times daily Strips per patient's preference    Type 2 diabetes mellitus with diabetic polyneuropathy, without long-term current use of insulin (H)       bumetanide 2 MG tablet    BUMEX    90 tablet    Take 1 tablet (2 mg) by mouth daily May take 1mg extra as directed by [facility] clinic.    Chronic diastolic heart failure (H)       clotrimazole 1 % cream    LOTRIMIN    60 g    Apply topically 2 times daily    Dermatitis       EPINEPHrine 0.3 MG/0.3ML injection 2-pack    EPIPEN/ADRENACLICK/or ANY BX GENERIC EQUIV    0.6 mL    Inject 0.3 mLs (0.3 mg) into the muscle once as needed for anaphylaxis    Allergic reaction, subsequent encounter       escitalopram 20 MG tablet    LEXAPRO    30 tablet    Take 1 tablet (20 mg) by mouth every morning     Recurrent major depressive disorder, in partial remission (H)       * febuxostat 40 MG Tabs tablet    ULORIC     Take 40 mg by mouth every evening        * ULORIC 40 MG Tabs tablet   Generic drug:  febuxostat     90 tablet    TAKE 1 TABLET(40 MG) BY MOUTH EVERY EVENING    Hyperuricemia       ferrous gluconate 324 (38 Fe) MG tablet    FERGON    36 tablet    Take 1 tablet (324 mg) by mouth Every Mon, Wed, Fri Morning    Chronic diastolic heart failure (H), Iron deficiency anemia secondary to inadequate dietary iron intake       FOLIC ACID PO      Take 1 mg by mouth daily        guaiFENesin 600 MG 12 hr tablet    MUCINEX     Take 1 tablet (600 mg) by mouth 2 times daily        insulin isophane human 100 UNIT/ML injection    HumuLIN N PEN    15 mL    Inject 35 Units Subcutaneous 2 times daily    Type 2 diabetes mellitus with stage 3 chronic kidney disease, with long-term current use of insulin (H)       insulin syringes (disposable) U-100 1 ML Misc     100 each    1 each 5 times daily    Type 2 diabetes mellitus with chronic kidney disease, without long-term current use of insulin, unspecified CKD stage (H)       levothyroxine 150 MCG tablet    SYNTHROID/LEVOTHROID    30 tablet    Take 1 tablet (150 mcg) by mouth daily    Other specified hypothyroidism       LIFESCAN FINEPOINT LANCETS Misc     100 each    Use to test blood sugars 2 times daily or as directed.    Type 2 diabetes mellitus with diabetic polyneuropathy, without long-term current use of insulin (H)       losartan 25 MG tablet    COZAAR    30 tablet    Take 1 tablet (25 mg) by mouth daily    Chronic diastolic heart failure (H)       Metoprolol Tartrate 75 MG Tabs     180 tablet    Take 75 mg by mouth 2 times daily    Atherosclerosis of native coronary artery without angina pectoris, unspecified whether native or transplanted heart       niacin 500 MG CR tablet    NIASPAN     Take 500 mg by mouth daily        nitroGLYcerin 0.4 MG sublingual tablet     NITROSTAT    25 tablet    For chest pain place 1 tablet under the tongue every 5 minutes for 3 doses. If symptoms persist 5 minutes after 1st dose call 911.    Atherosclerosis of native coronary artery without angina pectoris, unspecified whether native or transplanted heart       ONE TOUCH ULTRA (DEVICES) Misc     1    test blood sugar BID    Type II or unspecified type diabetes mellitus without mention of complication, not stated as uncontrolled       potassium chloride SA 10 MEQ CR tablet    K-DUR/KLOR-CON M    360 tablet    Take 2-4 tablets (20-40 mEq) by mouth 3 times daily As directed by CORE    Chronic diastolic heart failure (H)       pregabalin 100 MG capsule    LYRICA    90 capsule    Take 1 capsule (100 mg) by mouth 2 times daily    Pain in joint of left shoulder       repaglinide 1 MG tablet    PRANDIN    90 tablet    Take 1 tablet (1 mg) by mouth 3 times daily (before meals)    Type 2 diabetes mellitus with diabetic chronic kidney disease, unspecified CKD stage, unspecified long term insulin use status       senna-docusate 8.6-50 MG per tablet    SENOKOT-S;PERICOLACE    30 tablet    Take 1-2 tablets by mouth 2 times daily as needed for constipation    Atherosclerosis of native coronary artery without angina pectoris, unspecified whether native or transplanted heart       TIZANIDINE HCL PO      Take 4 mg by mouth At Bedtime        traZODone 50 MG tablet    DESYREL    270 tablet    Take 3 tablets (150 mg) by mouth At Bedtime    Recurrent major depressive disorder, in partial remission (H)       VITAMIN D (CHOLECALCIFEROL) PO      Take 10,000 Units by mouth daily        * Notice:  This list has 4 medication(s) that are the same as other medications prescribed for you. Read the directions carefully, and ask your doctor or other care provider to review them with you.

## 2018-11-13 NOTE — NURSING NOTE
[facility] given to patient via e-mail.    Milagro Dye [facility] [place] [facility]

## 2018-11-13 NOTE — PATIENT INSTRUCTIONS
Plan:  Recommended not to put pressure below the knee. Call if your develop a weakness in the right leg.   Use walker or cane for stability  Return if you develop any new weakness in your legs or other parts of your body.   Return if involuntary movements are more frequent and loss of consciousness or any other seizure like activity  Stroke prevention-control of diabetes, lipids control thru PCP. Activity as tolerated.   Work with PCP on way to lose weight for stroke prevention.   Use CPAP and see if helps with the symptoms.   Follow up as needed if getting worse-weakness, gait problems, resting tremor or other new symptoms.         
[facility]
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
## 2018-11-13 NOTE — NURSING NOTE
"    Chief Complaint   Patient presents with     Study Results     PSG       Initial /63  Pulse 61  Resp 18  Ht 1.676 m (5' 6\")  Wt 135.6 kg (299 lb)  SpO2 98%  BMI 48.26 kg/m2 Estimated body mass index is 48.26 kg/(m^2) as calculated from the following:    Height as of this encounter: 1.676 m (5' 6\").    Weight as of this encounter: 135.6 kg (299 lb).    Medication Reconciliation: complete    Neck circumference:  inches /  centimeters.    DME: [facility] ~[place]       "
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
## 2018-11-13 NOTE — PATIENT INSTRUCTIONS
= Please call [phone] to schedule a bi-pap set up at the [facility] in [place]. Once you have an appointment for the set up then call [phone] and schedule a 6 week follow up.   = At your 6 week follow up you will need to bring the bi-pap with you.   = Two weeks after starting the bi-pap we will have [facility] send out a over night oxygen test to your home. Once the results are done we will let you know the results via e-mail.      Your BMI is There is no height or weight on file to calculate BMI.  Weight management is a personal decision.  If you are interested in exploring weight loss strategies, the following discussion covers the approaches that may be successful. Body mass index (BMI) is one way to tell whether you are at a healthy weight, overweight, or obese. It measures your weight in relation to your height.  A BMI of 18.5 to 24.9 is in the healthy range. A person with a BMI of 25 to 29.9 is considered overweight, and someone with a BMI of 30 or greater is considered obese. More than two-thirds of American adults are considered overweight or obese.  Being overweight or obese increases the risk for further weight gain. Excess weight may lead to heart disease and diabetes.  Creating and following plans for healthy eating and physical activity may help you improve your health.  Weight control is part of healthy lifestyle and includes exercise, emotional health, and healthy eating habits. Careful eating habits lifelong are the mainstay of weight control. Though there are significant health benefits from weight loss, long-term weight loss with diet alone may be very difficult to achieve- studies show long-term success with dietary management in less than 10% of people. Attaining a healthy weight may be especially difficult to achieve in those with severe obesity. In some cases, medications, devices and surgical management might be considered.  What can you do?  If you are  overweight or obese and are interested in methods for weight loss, you should discuss this with your provider.     Consider reducing daily calorie intake by 500 calories.     Keep a food journal.     Avoiding skipping meals, consider cutting portions instead.    Diet combined with exercise helps maintain muscle while optimizing fat loss. Strength training is particularly important for building and maintaining muscle mass. Exercise helps reduce stress, increase energy, and improves fitness. Increasing exercise without diet control, however, may not burn enough calories to loose weight.       Start walking three days a week 10-20 minutes at a time    Work towards walking thirty minutes five days a week     Eventually, increase the speed of your walking for 1-2 minutes at time    In addition, we recommend that you review healthy lifestyles and methods for weight loss available through the National Institutes of Health patient information sites:  http://win.niddk.nih.gov/publications/index.htm    And look into health and wellness programs that may be available through your health insurance provider, employer, local community center, or [employer].    Weight management plan: Patient was referred to their PCP to discuss a diet and exercise plan.

## 2018-11-13 NOTE — MR AVS SNAPSHOT
After Visit Summary   11/13/2018    Mariel Ribeiro    MRN: [id number]           Patient Information     Date Of Birth          1945        Visit Information        Provider Department      11/13/2018 9:20 AM Noah Girard MD [facility]        Today's Diagnoses     BELEN (obstructive sleep apnea)    -  1      Care Instructions    = Please call [phone] to schedule a bi-pap set up at the [facility] in [place]. Once you have an appointment for the set up then call [phone] and schedule a 6 week follow up.   = At your 6 week follow up you will need to bring the bi-pap with you.   = Two weeks after starting the bi-pap we will have [facility] send out a over night oxygen test to your home. Once the results are done we will let you know the results via e-mail.      Your BMI is There is no height or weight on file to calculate BMI.  Weight management is a personal decision.  If you are interested in exploring weight loss strategies, the following discussion covers the approaches that may be successful. Body mass index (BMI) is one way to tell whether you are at a healthy weight, overweight, or obese. It measures your weight in relation to your height.  A BMI of 18.5 to 24.9 is in the healthy range. A person with a BMI of 25 to 29.9 is considered overweight, and someone with a BMI of 30 or greater is considered obese. More than two-thirds of American adults are considered overweight or obese.  Being overweight or obese increases the risk for further weight gain. Excess weight may lead to heart disease and diabetes.  Creating and following plans for healthy eating and physical activity may help you improve your health.  Weight control is part of healthy lifestyle and includes exercise, emotional health, and healthy eating habits. Careful eating habits lifelong are the mainstay of weight control. Though there are significant  health benefits from weight loss, long-term weight loss with diet alone may be very difficult to achieve- studies show long-term success with dietary management in less than 10% of people. Attaining a healthy weight may be especially difficult to achieve in those with severe obesity. In some cases, medications, devices and surgical management might be considered.  What can you do?  If you are overweight or obese and are interested in methods for weight loss, you should discuss this with your provider.     Consider reducing daily calorie intake by 500 calories.     Keep a food journal.     Avoiding skipping meals, consider cutting portions instead.    Diet combined with exercise helps maintain muscle while optimizing fat loss. Strength training is particularly important for building and maintaining muscle mass. Exercise helps reduce stress, increase energy, and improves fitness. Increasing exercise without diet control, however, may not burn enough calories to loose weight.       Start walking three days a week 10-20 minutes at a time    Work towards walking thirty minutes five days a week     Eventually, increase the speed of your walking for 1-2 minutes at time    In addition, we recommend that you review healthy lifestyles and methods for weight loss available through the National Institutes of Health patient information sites:  http://win.niddk.nih.gov/publications/index.htm    And look into health and wellness programs that may be available through your health insurance provider, employer, local community center, or hans club.    Weight management plan: Patient was referred to their PCP to discuss a diet and exercise plan.              Follow-ups after your visit        Follow-up notes from your care team     Return in about 6 weeks (around 12/25/2018).      Your next 10 appointments already scheduled     Nov 13, 2018 11:00 AM CST   (Arrive by 10:45 AM)   Return Visit with CHANTAL Soriano  CNP   [facility] Neurology ([facility])    [address]  3rd Floor  [facility] [address]   [phone]            Nov 29, 2018  2:00 PM CST   (Arrive by 1:45 PM)   RETURN DIABETES with Odalis Medley PA-C   [facility] Endocrinology ([facility])    [address]  3rd Floor  [facility] [address]   [phone]            Dec 10, 2018 12:00 PM CST   Lab with [facility]    Health Lab ([facility])    [address] [place] [address]  1st Floor  [facility] [address]   [phone]            Dec 10, 2018 12:30 PM CST   (Arrive by 12:15 PM)   CORE RETURN with Starla Saez NP   [facility] Heart Care ([facility])    [address] [place] [address]  [address]  [facility] [address]   [phone]            Jan 14, 2019  2:30 PM CST   (Arrive by 2:15 PM)   RETURN DIABETES with Keven Jaeger MD   [facility] Endocrinology ([facility])    [address]  3rd Floor  [facility] [address]   [phone]            Mar 13, 2019  2:30 PM CDT   Lab with  LAB   [facility] Lab ([facility])    [address] [place] [address]  1st Floor  [facility] [address]   [phone]            Mar 13, 2019  3:00 PM CDT   (Arrive by 2:45 PM)   RETURN HEART FAILURE with Stephanie Wolf MD   [facility] ([facility])    [address] [place] [address]  [address]  [facility] [address]   [phone]              Future tests that were ordered for you today     Open Future Orders        Priority Expected Expires Ordered    Overnight oximetry study Routine  5/12/2019 11/13/2018    Follow-Up with Advanced Heart Failure Cardiologist Routine 3/6/2019 5/17/2019 11/12/2018    Follow-Up with [facility] Routine 12/10/2018 2/17/2019 11/12/2018    CARDIAC REHAB REFERRAL Routine  11/12/2019 11/12/2018            Who to contact     If you have  "questions or need follow up information about today's clinic visit or your schedule please contact [facility] directly at [phone].  Normal or non-critical lab and imaging results will be communicated to you by [employer], letter or phone within 4 business days after the clinic has received the results. If you do not hear from us within 7 days, please contact the clinic through [employer] or phone. If you have a critical or abnormal lab result, we will notify you by phone as soon as possible.  Submit refill requests through Mapbar or call your pharmacy and they will forward the refill request to us. Please allow 3 business days for your refill to be completed.          Additional Information About Your Visit        [employer] Home Information     Mapbar gives you secure access to your electronic health record. If you see a primary care provider, you can also send messages to your care team and make appointments. If you have questions, please call your primary care clinic.  If you do not have a primary care provider, please call [phone] and they will assist you.        Care EveryWhere ID     This is your Care EveryWhere ID. This could be used by other organizations to access your [facility] medical records  [id number]        Your Vitals Were     Pulse Respirations Height Pulse Oximetry BMI (Body Mass Index)       61 18 1.676 m (5' 6\") 98% 48.26 kg/m2        Blood Pressure from Last 3 Encounters:   11/13/18 129/63   11/12/18 123/48   10/09/18 140/68    Weight from Last 3 Encounters:   11/13/18 135.6 kg (299 lb)   11/12/18 136.1 kg (300 lb)   10/09/18 135.2 kg (298 lb)              We Performed the Following     Comprehensive DME          Today's Medication Changes          These changes are accurate as of 11/13/18 10:03 AM.  If you have any questions, ask your nurse or doctor.               These medicines have changed or have updated prescriptions.        Dose/Directions    acetaminophen 325 MG " tablet   Commonly known as:  TYLENOL   This may have changed:  when to take this   Used for:  S/P CABG x 3, Acute post-operative pain        Dose:  650 mg   Take 2 tablets (650 mg) by mouth every 4 hours as needed for mild pain   Quantity:  100 tablet   Refills:  0       LIFESCAN FINEPOINT LANCETS Misc   This may have changed:  additional instructions   Used for:  Type 2 diabetes mellitus with diabetic polyneuropathy, without long-term current use of insulin (H)        Use to test blood sugars 2 times daily or as directed.   Quantity:  100 each   Refills:  prn       ONE TOUCH ULTRA (DEVICES) Misc   This may have changed:  See the new instructions.   Used for:  Type II or unspecified type diabetes mellitus without mention of complication, not stated as uncontrolled        test blood sugar BID   Quantity:  1   Refills:  0       senna-docusate 8.6-50 MG per tablet   Commonly known as:  SENOKOT-S;PERICOLACE   This may have changed:  when to take this   Used for:  Atherosclerosis of native coronary artery without angina pectoris, unspecified whether native or transplanted heart        Dose:  1-2 tablet   Take 1-2 tablets by mouth 2 times daily as needed for constipation   Quantity:  30 tablet   Refills:  0                Primary Care Provider Office Phone # Fax #    aRfaela William [phone] [address] [place] [address]        Goals        General    Pain Management (pt-stated)     Notes - Note edited  11/1/2018 10:27 AM by Senia Durán, RN    Goal Statement: I will decrease back pain   Measure of Success: Increased mobility with daily activity   Supportive Steps to Achieve:   I will start taking Tylenol 100 mg 3 times a day per Dr William   I will use heat ice and rest   I will use my back brace   I will use my rolling walker   Barriers: Deconditioned   Strengths: Continues home care services   Date to Achieve By: to be determined   Patient expressed understanding of goal:  yes          Equal Access to Services     BRANDON [facility] CenterAMADOU : Hadii von santillan hadpapio Sopasha, waaxda luqadaha, qaybta kaalmada brandon, gopal pena . So [facility] [phone].    ATENCIÓN: Si habla español, tiene a gillespie disposición servicios gratuitos de asistencia lingüística. Stephame al [phone].    We comply with applicable federal civil rights laws and [place] laws. We do not discriminate on the basis of race, color, national origin, age, disability, sex, sexual orientation, or gender identity.            Thank you!     Thank you for choosing [facility]  for your care. Our goal is always to provide you with excellent care. Hearing back from our patients is one way we can continue to improve our services. Please take a few minutes to complete the written survey that you may receive in the mail after your visit with us. Thank you!             Your Updated Medication List - Protect others around you: Learn how to safely use, store and throw away your medicines at www.disposemymeds.org.          This list is accurate as of 11/13/18 10:03 AM.  Always use your most recent med list.                   Brand Name Dispense Instructions for use Diagnosis    acetaminophen 325 MG tablet    TYLENOL    100 tablet    Take 2 tablets (650 mg) by mouth every 4 hours as needed for mild pain    S/P CABG x 3, Acute post-operative pain       albuterol 108 (90 Base) MCG/ACT inhaler    PROAIR HFA    1 Inhaler    Inhale 1-2 puffs into the lungs every 4 hours as needed for wheezing    Chronic obstructive pulmonary disease, unspecified COPD type (H)       aspirin 325 MG EC tablet     30 tablet    Take 1 tablet by mouth daily.        atorvastatin 40 MG tablet    LIPITOR    30 tablet    Take 1 tablet (40 mg) by mouth daily    Atherosclerosis of native coronary artery without angina pectoris, unspecified whether native or transplanted heart       * B-D U/F 31G X 5 MM   Generic drug:  insulin pen  needle     100 each    USE FOR INSULIN INJECTION    Type 2 diabetes mellitus with diabetic polyneuropathy, without long-term current use of insulin (H)       * B-D U/F 31G X 8 MM   Generic drug:  insulin pen needle     100 each    USE FOR INSULIN INJECTION    Type 2 diabetes mellitus with stage 3 chronic kidney disease, with long-term current use of insulin (H)       blood glucose monitoring test strip    no brand specified    200 each    1 strip by In Vitro route 2 times daily Strips per patient's preference    Type 2 diabetes mellitus with diabetic polyneuropathy, without long-term current use of insulin (H)       bumetanide 2 MG tablet    BUMEX    90 tablet    Take 1 tablet (2 mg) by mouth daily May take 1mg extra as directed by [facility] clinic.    Chronic diastolic heart failure (H)       clotrimazole 1 % cream    LOTRIMIN    60 g    Apply topically 2 times daily    Dermatitis       EPINEPHrine 0.3 MG/0.3ML injection 2-pack    EPIPEN/ADRENACLICK/or ANY BX GENERIC EQUIV    0.6 mL    Inject 0.3 mLs (0.3 mg) into the muscle once as needed for anaphylaxis    Allergic reaction, subsequent encounter       escitalopram 20 MG tablet    LEXAPRO    30 tablet    Take 1 tablet (20 mg) by mouth every morning    Recurrent major depressive disorder, in partial remission (H)       * febuxostat 40 MG Tabs tablet    ULORIC     Take 40 mg by mouth every evening        * ULORIC 40 MG Tabs tablet   Generic drug:  febuxostat     90 tablet    TAKE 1 TABLET(40 MG) BY MOUTH EVERY EVENING    Hyperuricemia       ferrous gluconate 324 (38 Fe) MG tablet    FERGON    36 tablet    Take 1 tablet (324 mg) by mouth Every Mon, Wed, Fri Morning    Chronic diastolic heart failure (H), Iron deficiency anemia secondary to inadequate dietary iron intake       FOLIC ACID PO      Take 1 mg by mouth daily        guaiFENesin 600 MG 12 hr tablet    MUCINEX     Take 1 tablet (600 mg) by mouth 2 times daily        insulin isophane human 100 UNIT/ML injection     HumuLIN N PEN    15 mL    Inject 35 Units Subcutaneous 2 times daily    Type 2 diabetes mellitus with stage 3 chronic kidney disease, with long-term current use of insulin (H)       insulin syringes (disposable) U-100 1 ML Misc     100 each    1 each 5 times daily    Type 2 diabetes mellitus with chronic kidney disease, without long-term current use of insulin, unspecified CKD stage (H)       levothyroxine 150 MCG tablet    SYNTHROID/LEVOTHROID    30 tablet    Take 1 tablet (150 mcg) by mouth daily    Other specified hypothyroidism       LIFESCAN FINEPOINT LANCETS Misc     100 each    Use to test blood sugars 2 times daily or as directed.    Type 2 diabetes mellitus with diabetic polyneuropathy, without long-term current use of insulin (H)       losartan 25 MG tablet    COZAAR    30 tablet    Take 1 tablet (25 mg) by mouth daily    Chronic diastolic heart failure (H)       Metoprolol Tartrate 75 MG Tabs     180 tablet    Take 75 mg by mouth 2 times daily    Atherosclerosis of native coronary artery without angina pectoris, unspecified whether native or transplanted heart       niacin 500 MG CR tablet    NIASPAN     Take 500 mg by mouth daily        nitroGLYcerin 0.4 MG sublingual tablet    NITROSTAT    25 tablet    For chest pain place 1 tablet under the tongue every 5 minutes for 3 doses. If symptoms persist 5 minutes after 1st dose call 911.    Atherosclerosis of native coronary artery without angina pectoris, unspecified whether native or transplanted heart       ONE TOUCH ULTRA (DEVICES) Misc     1    test blood sugar BID    Type II or unspecified type diabetes mellitus without mention of complication, not stated as uncontrolled       potassium chloride SA 10 MEQ CR tablet    K-DUR/KLOR-CON M    360 tablet    Take 2-4 tablets (20-40 mEq) by mouth 3 times daily As directed by [facility]    Chronic diastolic heart failure (H)       pregabalin 100 MG capsule    LYRICA    90 capsule    Take 1 capsule (100 mg) by mouth 2  times daily    Pain in joint of left shoulder       repaglinide 1 MG tablet    PRANDIN    90 tablet    Take 1 tablet (1 mg) by mouth 3 times daily (before meals)    Type 2 diabetes mellitus with diabetic chronic kidney disease, unspecified CKD stage, unspecified long term insulin use status       senna-docusate 8.6-50 MG per tablet    SENOKOT-S;PERICOLACE    30 tablet    Take 1-2 tablets by mouth 2 times daily as needed for constipation    Atherosclerosis of native coronary artery without angina pectoris, unspecified whether native or transplanted heart       TIZANIDINE HCL PO      Take 4 mg by mouth At Bedtime        traZODone 50 MG tablet    DESYREL    270 tablet    Take 3 tablets (150 mg) by mouth At Bedtime    Recurrent major depressive disorder, in partial remission (H)       VITAMIN D (CHOLECALCIFEROL) PO      Take 10,000 Units by mouth daily        * Notice:  This list has 4 medication(s) that are the same as other medications prescribed for you. Read the directions carefully, and ask your doctor or other care provider to review them with you.

## 2018-11-13 NOTE — NURSING NOTE
Chief Complaint   Patient presents with     RECHECK     [facility] RETURN      VITALS COPIED FROM PREVIOUS APPT.    Holly Molina, EMT

## 2018-11-15 ENCOUNTER — DOCUMENTATION ONLY (OUTPATIENT)
Dept: SLEEP MEDICINE | Facility: CLINIC | Age: 73
End: 2018-11-15

## 2018-11-15 NOTE — PROGRESS NOTES
Patient was offered choice of vendor and chose [facility].  Patient Mariel Ribeiro was set up at [facility] on November 15, 2018. Patient received a Resmed AirCurve 10 Bilevel. Pressures were set at 16/8 cm H2O.   Patient s ramp is 5 cm H2O for 20 min and FLEX/EPR is 2.  Patient received a Resmed Mask name: F30  Full Face mask Size Small, heated tubing and heated humidifier.  Patient is enrolled in the STM Program and does need to meet compliance. Patient has a follow up on 12/19/18 @1PM with Dr. Girard.    Zenaida Billings

## 2018-11-19 ENCOUNTER — DOCUMENTATION ONLY (OUTPATIENT)
Dept: SLEEP MEDICINE | Facility: CLINIC | Age: 73
End: 2018-11-19

## 2018-11-19 NOTE — PROGRESS NOTES
3 DAY STM VISIT    Diagnostic AHI: 33.3 PSG    Patient contacted for 3 day STM visit  Subjective measures:  Pt hasn't started using because she hurt her back but she will hopefully start using it tonight.     Device type: Bilevel  PAP settings from order::  16/8cm  H20  Mask type:    Full Face Mask     Assessment: No profile in Airview/Encore.  Interface error corrected.  Action plan: Pt to have f/u 14 day STM visit.  Patient has a follow up visit scheduled:   yes within 31-90 days of set up.

## 2018-11-25 DIAGNOSIS — E03.9 HYPOTHYROIDISM, UNSPECIFIED TYPE: ICD-10-CM

## 2018-11-26 ENCOUNTER — HOSPITAL ENCOUNTER (OUTPATIENT)
Dept: CARDIAC REHAB | Facility: CLINIC | Age: 73
End: 2018-11-26
Attending: INTERNAL MEDICINE
Payer: MEDICARE

## 2018-11-26 VITALS — HEIGHT: 66 IN | BODY MASS INDEX: 47.09 KG/M2 | WEIGHT: 293 LBS

## 2018-11-26 DIAGNOSIS — Z95.1 S/P CABG (CORONARY ARTERY BYPASS GRAFT): ICD-10-CM

## 2018-11-26 PROCEDURE — 93798 PHYS/QHP OP CAR RHAB W/ECG: CPT

## 2018-11-26 PROCEDURE — 40000575 ZZH STATISTIC OP CARDIAC VISIT #2

## 2018-11-26 PROCEDURE — 40000116 ZZH STATISTIC OP CR VISIT

## 2018-11-26 PROCEDURE — 93797 PHYS/QHP OP CAR RHAB WO ECG: CPT | Mod: KX,59

## 2018-11-26 ASSESSMENT — 6 MINUTE WALK TEST (6MWT)
FEMALE CALC: 948.18
PREDICTED: 1174.43
MALE CALC: 1167.31
GENDER SELECTION: FEMALE

## 2018-11-26 NOTE — TELEPHONE ENCOUNTER
"Requested Prescriptions   Pending Prescriptions Disp Refills     SYNTHROID 150 MCG tablet [Pharmacy Med Name: SYNTHROID 0.15MG (150MCG)TABLETS]  Last Written Prescription Date:  8-23-18  Last Fill Quantity: 30 tab,  # refills: 0   Last office visit: 9/12/2018 with prescribing provider:  Rafaela William    Future Office Visit:    90 tablet 0     Sig: TAKE 1 TABLET(150 MCG) BY MOUTH DAILY    Thyroid Protocol Passed    11/25/2018  3:50 AM       Passed - Patient is 12 years or older       Passed - Recent (12 mo) or future (30 days) visit within the authorizing provider's specialty    Patient had office visit in the last 12 months or has a visit in the next 30 days with authorizing provider or within the authorizing provider's specialty.  See \"Patient Info\" tab in inbasket, or \"Choose Columns\" in Meds & Orders section of the refill encounter.           Passed - Normal TSH on file in past 12 months    Recent Labs   Lab Test  08/28/18   1427   TSH  2.97          Passed - No active pregnancy on record    If patient is pregnant or has had a positive pregnancy test, please check TSH.       Passed - No positive pregnancy test in past 12 months    If patient is pregnant or has had a positive pregnancy test, please check TSH.          "
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
## 2018-11-27 RX ORDER — LEVOTHYROXINE SODIUM 150 MCG
TABLET ORAL
Qty: 90 TABLET | Refills: 1 | Status: SHIPPED | OUTPATIENT
Start: 2018-11-27 | End: 2018-11-29

## 2018-11-27 NOTE — TELEPHONE ENCOUNTER
Prescription approved per [facility] Refill Protocol.    Elissa Magana, SONIAN, RN  [facility]3]

## 2018-11-29 ENCOUNTER — PATIENT OUTREACH (OUTPATIENT)
Dept: CARE COORDINATION | Facility: CLINIC | Age: 73
End: 2018-11-29

## 2018-11-29 ENCOUNTER — OFFICE VISIT (OUTPATIENT)
Dept: ENDOCRINOLOGY | Facility: CLINIC | Age: 73
End: 2018-11-29
Payer: MEDICARE

## 2018-11-29 VITALS
BODY MASS INDEX: 47.09 KG/M2 | HEIGHT: 66 IN | DIASTOLIC BLOOD PRESSURE: 58 MMHG | SYSTOLIC BLOOD PRESSURE: 130 MMHG | WEIGHT: 293 LBS | HEART RATE: 55 BPM

## 2018-11-29 DIAGNOSIS — M54.50 LOWER BACK PAIN: Primary | ICD-10-CM

## 2018-11-29 DIAGNOSIS — I25.10 CORONARY ATHEROSCLEROSIS: ICD-10-CM

## 2018-11-29 DIAGNOSIS — E11.22 TYPE 2 DIABETES MELLITUS WITH STAGE 3 CHRONIC KIDNEY DISEASE, WITH LONG-TERM CURRENT USE OF INSULIN (H): Primary | Chronic | ICD-10-CM

## 2018-11-29 DIAGNOSIS — Z79.4 TYPE 2 DIABETES MELLITUS WITH STAGE 3 CHRONIC KIDNEY DISEASE, WITH LONG-TERM CURRENT USE OF INSULIN (H): Primary | Chronic | ICD-10-CM

## 2018-11-29 DIAGNOSIS — N18.30 TYPE 2 DIABETES MELLITUS WITH STAGE 3 CHRONIC KIDNEY DISEASE, WITH LONG-TERM CURRENT USE OF INSULIN (H): Primary | Chronic | ICD-10-CM

## 2018-11-29 LAB — HBA1C MFR BLD: 5.5 % (ref 4.3–6)
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
## 2018-11-29 NOTE — PATIENT INSTRUCTIONS
1. Decrease NPH insulin 28 units in am and in pm.  2.  Hold Prandin with lunch on days you go to rehab therapy.  3.  See eye doctor.  4. See Dr. Jaeger in 4 months.

## 2018-11-29 NOTE — NURSING NOTE
Chief Complaint   Patient presents with     RECHECK     Type II Diabetes     Performed capillary puncture for A1C testing. Patient tolerated well.    Yasmany García, [facility]  Endocrinology & Diabetes

## 2018-11-29 NOTE — MR AVS SNAPSHOT
After Visit Summary   11/29/2018    Mariel Ribeiro    MRN: [id number]           Patient Information     Date Of Birth          1945        Visit Information        Provider Department      11/29/2018 2:00 PM Odalis Medley PA-C [facility] Endocrinology        Today's Diagnoses     Type 2 diabetes mellitus with stage 3 chronic kidney disease, with long-term current use of insulin (H)    -  1      Care Instructions    1. Decrease NPH insulin 28 units in am and in pm.  2.  Hold Prandin with lunch on days you go to rehab therapy.  3.  See eye doctor.  4. See Dr. Jaeger in 4 months.            Follow-ups after your visit        Your next 10 appointments already scheduled     Nov 30, 2018  1:00 PM CST   Cardiac Treatment with Ur Cardiac Rehab 1   [facility], Cardiac Rehabilitation ([facility])    [address] [place] [address] [address]st [address] [address]   [phone]            Dec 03, 2018  1:00 PM CST   Cardiac Treatment with Ur Cardiac Rehab 1   [facility], Cardiac Rehabilitation ([facility])    [address] [place] [address] [address]st Floor [address] [address]   [phone]            Dec 05, 2018  3:00 PM CST   Cardiac Treatment with Ur Cardiac Rehab 1   [facility], Cardiac Rehabilitation ([facility])    [address] [place] [address] [address]st Floor [address] [address]   [phone]            Dec 07, 2018  1:00 PM CST   Cardiac Treatment with Ur Cardiac Rehab 1   [facility], Cardiac Rehabilitation ([facility])    [address] [place] [address] [address]st Floor [address] [address]   [phone]            Dec 10, 2018 12:00 PM CST   Lab with  LAB    Health Lab ([facility])    [address] [place] [address]  Floor  [facility] [address]   [phone]            Dec 10, 2018 12:30 PM CST   (Arrive by 12:15 PM)   CORE RETURN with GRISEL Hernández [facility] Heart Care ([facility])    [address]  [address]  [facility] [address]   [phone]            Dec 10, 2018  1:00 PM CST   Cardiac Treatment with Ur Cardiac Rehab 1   [facility], Cardiac Rehabilitation ([facility])    [address] 1st [address]  [facility] [address]   [phone]            Dec 12, 2018 12:40 PM CST   (Arrive by 12:25 PM)   RETURN GENERAL with REYES Persaud [facility] Ophthalmology ([facility])    [address]  4th Floor  [facility] [address]   [phone]            Dec 12, 2018  3:00 PM CST   Cardiac Treatment with Ur Cardiac Rehab 1   [facility], Cardiac Rehabilitation ([facility])    [facility] [address]st [address] [place] [address]   [phone]            Dec 14, 2018  1:00 PM CST   Cardiac Treatment with Ur Cardiac Rehab 1   [facility], Cardiac Rehabilitation ([facility])    [address] [place] [address] [address]st Floor [address] [address]   [phone]              Who to contact     Please call your clinic at [phone] to:    Ask questions about your health    Make or cancel appointments    Discuss your medicines    Learn about your test results    Speak to your doctor            Additional Information About Your Visit        MyChart Information     [employer] gives you secure access to your electronic health record. If you see a primary care provider, you can also send messages to your care team and make appointments. If you have questions, please call your primary care clinic.  If you do not have a primary care provider,  "please call [phone] and they will assist you.      [employer] is an electronic gateway that provides easy, online access to your medical records. With [employer], you can request a clinic appointment, read your test results, renew a prescription or communicate with your care team.     To access your existing account, please contact your [facility] or call [phone] for assistance.        Care EveryWhere ID     This is your Care EveryWhere ID. This could be used by other organizations to access your [facility] medical records  [id number]        Your Vitals Were     Pulse Height BMI (Body Mass Index)             55 1.676 m (5' 6\") 49.08 kg/m2          Blood Pressure from Last 3 Encounters:   11/29/18 130/58   11/13/18 129/63   11/13/18 129/63    Weight from Last 3 Encounters:   11/29/18 137.9 kg (304 lb 1.6 oz)   11/26/18 137.4 kg (303 lb)   11/13/18 135.6 kg (299 lb)              We Performed the Following     Hemoglobin A1c POCT          Today's Medication Changes          These changes are accurate as of 11/29/18  2:50 PM.  If you have any questions, ask your nurse or doctor.               These medicines have changed or have updated prescriptions.        Dose/Directions    acetaminophen 325 MG tablet   Commonly known as:  TYLENOL   This may have changed:  when to take this   Used for:  S/P CABG x 3, Acute post-operative pain        Dose:  650 mg   Take 2 tablets (650 mg) by mouth every 4 hours as needed for mild pain   Quantity:  100 tablet   Refills:  0       blood glucose lancets   This may have changed:  additional instructions   Used for:  Type 2 diabetes mellitus with diabetic polyneuropathy, without long-term current use of insulin (H)        Use to test blood sugars 2 times daily or as directed.   Quantity:  100 each   Refills:  prn       HumuLIN N KWIKPEN 100 UNIT/ML injection   This may have changed:  Another medication with the same name was removed. Continue taking this " medication, and follow the directions you see here.   Generic drug:  insulin isophane human   Changed by:  Odalis Medley PA-C        Inject 28 units subcutaneous each am and each pm.   Quantity:  15 mL   Refills:  3       ONE TOUCH ULTRA (DEVICES) Misc   This may have changed:  See the new instructions.   Used for:  Type II or unspecified type diabetes mellitus without mention of complication, not stated as uncontrolled        test blood sugar BID   Quantity:  1   Refills:  0       senna-docusate 8.6-50 MG tablet   Commonly known as:  SENOKOT-S/PERICOLACE   This may have changed:    - when to take this  - reasons to take this   Used for:  Atherosclerosis of native coronary artery without angina pectoris, unspecified whether native or transplanted heart        Dose:  1-2 tablet   Take 1-2 tablets by mouth 2 times daily as needed for constipation   Quantity:  30 tablet   Refills:  0                Primary Care Provider Office Phone # Fax #    Rafaela William [phone]       [address] [place] [address]        Goals        General    Pain Management (pt-stated)     Notes - Note edited  11/1/2018 10:27 AM by Senia Durán, RN    Goal Statement: I will decrease back pain   Measure of Success: Increased mobility with daily activity   Supportive Steps to Achieve:   I will start taking Tylenol 100 mg 3 times a day per Dr William   I will use heat ice and rest   I will use my back brace   I will use my rolling walker   Barriers: Deconditioned   Strengths: Continues home care services   Date to Achieve By: to be determined   Patient expressed understanding of goal: yes          Equal Access to Services     KJ SALVADOR AH: Hadii von villelao Sopasha, waaxda luqadaha, qaybta kaalmada gopal taylor. So [facility] [phone].    ATENCIÓN: Si habla español, tiene a gillespie disposición servicios gratuitos de asistencia lingüística. Llame al  [phone].    We comply with applicable federal civil rights laws and [place] laws. We do not discriminate on the basis of race, color, national origin, age, disability, sex, sexual orientation, or gender identity.            Thank you!     Thank you for choosing [facility] ENDOCRINOLOGY  for your care. Our goal is always to provide you with excellent care. Hearing back from our patients is one way we can continue to improve our services. Please take a few minutes to complete the written survey that you may receive in the mail after your visit with us. Thank you!             Your Updated Medication List - Protect others around you: Learn how to safely use, store and throw away your medicines at www.disposemymeds.org.          This list is accurate as of 11/29/18  2:50 PM.  Always use your most recent med list.                   Brand Name Dispense Instructions for use Diagnosis    acetaminophen 325 MG tablet    TYLENOL    100 tablet    Take 2 tablets (650 mg) by mouth every 4 hours as needed for mild pain    S/P CABG x 3, Acute post-operative pain       albuterol 108 (90 Base) MCG/ACT inhaler    PROAIR HFA    1 Inhaler    Inhale 1-2 puffs into the lungs every 4 hours as needed for wheezing    Chronic obstructive pulmonary disease, unspecified COPD type (H)       aspirin 325 MG EC tablet    ASA    30 tablet    Take 1 tablet by mouth daily.        atorvastatin 40 MG tablet    LIPITOR    30 tablet    Take 1 tablet (40 mg) by mouth daily    Atherosclerosis of native coronary artery without angina pectoris, unspecified whether native or transplanted heart       * B-D U/F 31G X 5 MM miscellaneous   Generic drug:  insulin pen needle     100 each    USE FOR INSULIN INJECTION    Type 2 diabetes mellitus with diabetic polyneuropathy, without long-term current use of insulin (H)       * B-D U/F 31G X 8 MM miscellaneous   Generic drug:  insulin pen needle     100 each    USE FOR INSULIN INJECTION    Type 2 diabetes mellitus  with stage 3 chronic kidney disease, with long-term current use of insulin (H)       blood glucose lancets     100 each    Use to test blood sugars 2 times daily or as directed.    Type 2 diabetes mellitus with diabetic polyneuropathy, without long-term current use of insulin (H)       blood glucose monitoring test strip    NO BRAND SPECIFIED    200 each    1 strip by In Vitro route 2 times daily Strips per patient's preference    Type 2 diabetes mellitus with diabetic polyneuropathy, without long-term current use of insulin (H)       bumetanide 2 MG tablet    BUMEX    90 tablet    Take 1 tablet (2 mg) by mouth daily May take 1mg extra as directed by CORE clinic.    Chronic diastolic heart failure (H)       EPINEPHrine 0.3 MG/0.3ML injection 2-pack    EPIPEN/ADRENACLICK/or ANY BX GENERIC EQUIV    0.6 mL    Inject 0.3 mLs (0.3 mg) into the muscle once as needed for anaphylaxis    Allergic reaction, subsequent encounter       escitalopram 20 MG tablet    LEXAPRO    30 tablet    Take 1 tablet (20 mg) by mouth every morning    Recurrent major depressive disorder, in partial remission (H)       * febuxostat 40 MG Tabs tablet    ULORIC     Take 40 mg by mouth every evening        * ULORIC 40 MG Tabs tablet   Generic drug:  febuxostat     90 tablet    TAKE 1 TABLET(40 MG) BY MOUTH EVERY EVENING    Hyperuricemia       ferrous gluconate 324 (38 Fe) MG tablet    FERGON    36 tablet    Take 1 tablet (324 mg) by mouth Every Mon, Wed, Fri Morning    Chronic diastolic heart failure (H), Iron deficiency anemia secondary to inadequate dietary iron intake       FOLIC ACID PO      Take 1 mg by mouth daily        guaiFENesin 600 MG 12 hr tablet    MUCINEX     Take 1 tablet (600 mg) by mouth 2 times daily        HumuLIN N KWIKPEN 100 UNIT/ML injection   Generic drug:  insulin isophane human     15 mL    Inject 28 units subcutaneous each am and each pm.        insulin syringes (disposable) U-100 1 ML Misc     100 each    1 each 5 times  daily    Type 2 diabetes mellitus with chronic kidney disease, without long-term current use of insulin, unspecified CKD stage (H)       levothyroxine 150 MCG tablet    SYNTHROID/LEVOTHROID    30 tablet    Take 1 tablet (150 mcg) by mouth daily    Other specified hypothyroidism       losartan 25 MG tablet    COZAAR    30 tablet    Take 1 tablet (25 mg) by mouth daily    Chronic diastolic heart failure (H)       Metoprolol Tartrate 75 MG Tabs     180 tablet    Take 75 mg by mouth 2 times daily    Atherosclerosis of native coronary artery without angina pectoris, unspecified whether native or transplanted heart       niacin  MG CR tablet    NIASPAN     Take 500 mg by mouth daily        nitroGLYcerin 0.4 MG sublingual tablet    NITROSTAT    25 tablet    For chest pain place 1 tablet under the tongue every 5 minutes for 3 doses. If symptoms persist 5 minutes after 1st dose call 911.    Atherosclerosis of native coronary artery without angina pectoris, unspecified whether native or transplanted heart       ONE TOUCH ULTRA (DEVICES) Misc     1    test blood sugar BID    Type II or unspecified type diabetes mellitus without mention of complication, not stated as uncontrolled       potassium chloride ER 10 MEQ CR tablet    K-DUR/KLOR-CON M    360 tablet    Take 2-4 tablets (20-40 mEq) by mouth 3 times daily As directed by CORE    Chronic diastolic heart failure (H)       pregabalin 100 MG capsule    LYRICA    90 capsule    Take 1 capsule (100 mg) by mouth 2 times daily    Pain in joint of left shoulder       repaglinide 1 MG tablet    PRANDIN    90 tablet    Take 1 tablet (1 mg) by mouth 3 times daily (before meals)    Type 2 diabetes mellitus with diabetic chronic kidney disease, unspecified CKD stage, unspecified long term insulin use status       senna-docusate 8.6-50 MG tablet    SENOKOT-S/PERICOLACE    30 tablet    Take 1-2 tablets by mouth 2 times daily as needed for constipation    Atherosclerosis of native  coronary artery without angina pectoris, unspecified whether native or transplanted heart       TIZANIDINE HCL PO      Take 4 mg by mouth At Bedtime        traZODone 50 MG tablet    DESYREL    270 tablet    Take 3 tablets (150 mg) by mouth At Bedtime    Recurrent major depressive disorder, in partial remission (H)       VITAMIN D (CHOLECALCIFEROL) PO      Take 10,000 Units by mouth daily        * Notice:  This list has 4 medication(s) that are the same as other medications prescribed for you. Read the directions carefully, and ask your doctor or other care provider to review them with you.

## 2018-11-29 NOTE — PROGRESS NOTES
Clinic Care Coordination Contact  [facility]/Voicemail    Referral Source: ED Follow-Up    [facility] admission 9/9-9/10/2018-  Discharge Diagnoses          Acute midline low back pain, with sciatica presence unspecified  Diabetic hypoglycemia (H)  Encounter for long-term (current) use of insulin (H)  Falls frequently  Generalized muscle weakness  Type 2 diabetes mellitus with stage 3 chronic kidney disease, with long-term current use of insulin (H)  Clinical Data: Care Coordinator Outreach    Outreach attempted x 1.  Left message on voicemail with call back information and requested return call.    Plan: Care Coordinator will await a return call from the patient     Senia Durán RN / Clinical Care Coordinator     [facility]   [email or web address]  Office :  [phone] / Fax :  [phone]

## 2018-11-30 ENCOUNTER — DOCUMENTATION ONLY (OUTPATIENT)
Dept: SLEEP MEDICINE | Facility: CLINIC | Age: 73
End: 2018-11-30

## 2018-11-30 ENCOUNTER — HOSPITAL ENCOUNTER (OUTPATIENT)
Dept: CARDIAC REHAB | Facility: CLINIC | Age: 73
End: 2018-11-30
Attending: NURSE PRACTITIONER
Payer: MEDICARE

## 2018-11-30 PROCEDURE — 40000116 ZZH STATISTIC OP CR VISIT

## 2018-11-30 PROCEDURE — 93798 PHYS/QHP OP CAR RHAB W/ECG: CPT | Mod: KX

## 2018-11-30 NOTE — PROGRESS NOTES
14 DAY STM VISIT    Diagnostic AHI: 33.3   PSG    Subjective measures:  Pt reports that she had to have an  come out and install a new plug in for her to use her device.  This is now completed and she is excited to start using the device.  She will call back before 30 day STM if she has any questions or concerns.      Assessment: Pt not meeting objective benchmarks for compliance     Action plan: pt to have 30 day STM visit.    Device type: Bilevel  PAP settings: 16/8cm  H20  Mask type:  Full Face Mask  Objective measures: 14 day rolling measures      Compliance  0 %               Objective measure goal  Compliance   Goal >70%  Leak   Goal < 24 lpm  AHI  Goal < 5  Usage  Goal >240

## 2018-11-30 NOTE — PROGRESS NOTES
Clinic Care Coordination Contact    Clinic Care Coordination Contact  OUTREACH    Referral Information:  Referral Source: ED Follow-Up    Primary Diagnosis: Other (include Comment box) (weakness low blood sugar )    Chief Complaint   Patient presents with     Clinic Care Coordination - Follow-up     Clinic Care Coordination RN         [place] Utilization: [facility] admission 9/9-9/10/2018-  Discharge Diagnoses          Acute midline low back pain, with sciatica presence unspecified  Diabetic hypoglycemia (H)  Encounter for long-term (current) use of insulin (H)  Falls frequently  Generalized muscle weakness  Type 2 diabetes mellitus with stage 3 chronic kidney disease, with long-term current use of insulin (H)      Clinic Utilization  Difficulty keeping appointments:: No  Compliance Concerns: No  No PCP office visit in Past Year: No  Utilization    Last refreshed: 11/30/2018  9:39 AM:  No Show Count (past year) 3       Last refreshed: 11/30/2018  9:39 AM:  ED visits 1       Last refreshed: 11/30/2018  9:39 AM:  Hospital admissions 4          Current as of: 11/30/2018  9:39 AM             Clinical Concerns:  Current Medical Concerns:  Patient starts her first Cardiac Rehabilitation session today   Patient would like to work on her upper body strength.  Patient uses a walker for safety   Patient no longer uses the back brace for back discomfort   Pain  Pain (GOAL):: Yes  Location of chronic pain:: Back  Progression: Unchanged  Health Maintenance Reviewed: Due/Overdue Eye exam [place] CA screen and eye exam ( future appointment scheduled )  Clinical Pathway: Clinic Care Coordination CHF Assessment      CHF:    Home scale available:  Yes  Home scale weight this morning: weight between 294-304#  Legs below the knees are always swollen  When she has weight gain uses Bumex   Patient admits at times when she has a provider appointment she doesn't take Bumex due to going to the bathroom frequently   Heart  Failure Zones sheet on refrigerator or available: Yes  Any increased SOB since hospital discharge:  No  Any increased edema since hospital discharge:  No  What number to call for YELLOW zones: [phone]    Symptom Review:        Medications:  When is the first appointment with the CHF clinic: 12/10/2018          Medication Management:  Patient just saw her Endocrinologist and insulin adjusted to 28 units in the morning and evening.  Blood sugar readings all below 200     Functional Status:  Uses a cane        Diet/Exercise/Sleep:  Food Insecurity: No  Tube Feeding: No  Exercise:: Unable to exercise  Inadequate activity/exercise (GOAL):: No  Significant changes in sleep pattern (GOAL): No    Transportation:  Transportation concerns (GOAL):: No  Transportation means:: Family     Psychosocial:  Mental health DX:: No  Mental health management concern (GOAL):: No  Informal Support system:: Friends, Family     Referrals Placed: None     Goals:   Goals        General    Functional (pt-stated)     Notes - Note created  11/30/2018 11:16 AM by Senia Durán RN    Goal Statement: I will increase my upper body strength   Measure of Success: I will have more energy for daily activity   Supportive Steps to Achieve: I will attend Cardiac Rehabilitation 3 days a week for 12 weeks   Barriers:None   Strengths: Using a cane for safety   Date to Achieve By: 12 weeks   Patient expressed understanding of goal: Yes        Pain Management (pt-stated)     Notes - Note edited  11/30/2018 11:16 AM by Senia Durán RN    Goal Statement: I will decrease back pain   Measure of Success: Increased mobility with daily activity   Supportive Steps to Achieve:   I will start taking Tylenol 100 mg 3 times a day per Dr William   I will use heat ice and rest   I will use my cane for safety  Barriers: Deconditioned   Strengths: Continues home care services   Date to Achieve By: to be determined   Patient expressed understanding of goal: yes                   Future Appointments              Today 1, Ur Cardiac Rehab [facility] [place], Cardiac Rehabilitation, [place]    In 3 days 1, Ur Cardiac Rehab [facility] [place], Cardiac Rehabilitation, [place]    In 5 days 1, Ur Cardiac Rehab [facility] [place], Cardiac Rehabilitation, [place]    In 1 week 1, Ur Cardiac Rehab [facility] [place], Cardiac Rehabilitation, [place]    In 1 week UC LAB [facility] Lab, [facility]    In 1 week Starla Saez NP [facility] Heart Care, [facility]    In 1 week 1, Ur Cardiac Rehab [facility] [place], Cardiac Rehabilitation, [place]    In 1 week Joao Diaz OD [facility] Ophthalmology, [facility]    In 1 week 1, Ur Cardiac Rehab [facility] [place], Cardiac Rehabilitation, [place]    In 2 weeks 1, Ur Cardiac Rehab [facility] [place], Cardiac Rehabilitation, [place]    In 2 weeks 1, Ur Cardiac Rehab [facility] [place], Cardiac Rehabilitation, [place]    In 2 weeks Noah Girard MD [facility]    In 2 weeks 1, Ur Cardiac Rehab [facility] [place], Cardiac Rehabilitation, [place]    In 3 weeks 1, Ur Cardiac Rehab [facility] [place], Cardiac Rehabilitation, [place]    In 3 weeks 1, Ur Cardiac Rehab [facility] [place], Cardiac Rehabilitation, [place]    In 3 weeks 1, Ur Cardiac Rehab [facility] [place], Cardiac Rehabilitation, [place]    In 3 weeks 1, Ur Cardiac Rehab [facility] [place], Cardiac Rehabilitation, [place]    In 4 weeks 1, Ur Cardiac Rehab [facility] [place], Cardiac Rehabilitation, [place]    In 1 month 1, Ur Cardiac Rehab [facility] [place], Cardiac Rehabilitation, [place]    In 1 month 1, Ur Cardiac Rehab [facility] [place], Cardiac Rehabilitation, [place]    In 1 month 1, Ur Cardiac Rehab [facility] [place], Cardiac Rehabilitation, [place]    In 1 month 1, Ur Cardiac Rehab [facility] [place], Cardiac Rehabilitation, [place]    In 1 month 1, Ur Cardiac Rehab [facility] [place], Cardiac Rehabilitation, [place]    In 1 month 1, Ur Cardiac Rehab [facility]  [place], Cardiac Rehabilitation, [place]    In 1 month 1, Ur Cardiac Rehab [facility], Cardiac Rehabilitation, [place]    In 1 month 1, Ur Cardiac Rehab [facility], Cardiac Rehabilitation, [place]    In 1 month 1, Ur Cardiac Rehab [facility], Cardiac Rehabilitation, [place]    In 1 month 1, Ur Cardiac Rehab [facility], [place], Cardiac Rehabilitation, [place]    In 1 month 1, Ur Cardiac Rehab [facility], Cardiac Rehabilitation, [place]    In 3 months  LAB [facility] Lab, [facility]    In 3 months Stephanie Wolf MD [facility] Heart Care, [facility]    In 4 months Keven Jaeger MD [facility] Endocrinology, [facility]          Plan: CC will follow up in 1-2 weeks     Senia Durán RN / Clinical Care Coordinator     [facility]   [email or web address]  Office :  [phone] / Fax :  [phone]

## 2018-12-03 ENCOUNTER — PRE VISIT (OUTPATIENT)
Dept: CARDIOLOGY | Facility: CLINIC | Age: 73
End: 2018-12-03

## 2018-12-03 ENCOUNTER — HOSPITAL ENCOUNTER (OUTPATIENT)
Dept: CARDIAC REHAB | Facility: CLINIC | Age: 73
End: 2018-12-03
Attending: NURSE PRACTITIONER
Payer: MEDICARE

## 2018-12-03 DIAGNOSIS — I50.30 (HFPEF) HEART FAILURE WITH PRESERVED EJECTION FRACTION (H): Primary | ICD-10-CM

## 2018-12-03 PROCEDURE — 40000575 ZZH STATISTIC OP CARDIAC VISIT #2

## 2018-12-03 PROCEDURE — 93797 PHYS/QHP OP CAR RHAB WO ECG: CPT | Mod: KX

## 2018-12-03 PROCEDURE — 40000116 ZZH STATISTIC OP CR VISIT

## 2018-12-03 PROCEDURE — 93798 PHYS/QHP OP CAR RHAB W/ECG: CPT | Mod: KX

## 2018-12-05 DIAGNOSIS — E11.22 TYPE 2 DIABETES MELLITUS WITH STAGE 3 CHRONIC KIDNEY DISEASE, WITH LONG-TERM CURRENT USE OF INSULIN (H): Chronic | ICD-10-CM

## 2018-12-05 DIAGNOSIS — N18.30 TYPE 2 DIABETES MELLITUS WITH STAGE 3 CHRONIC KIDNEY DISEASE, WITH LONG-TERM CURRENT USE OF INSULIN (H): Chronic | ICD-10-CM

## 2018-12-05 DIAGNOSIS — Z79.4 TYPE 2 DIABETES MELLITUS WITH STAGE 3 CHRONIC KIDNEY DISEASE, WITH LONG-TERM CURRENT USE OF INSULIN (H): Chronic | ICD-10-CM

## 2018-12-05 NOTE — TELEPHONE ENCOUNTER
"Requested Prescriptions   Pending Prescriptions Disp Refills     HUMULIN N KWIKPEN 100 UNIT/ML susp [Pharmacy Med Name: HUMULIN N U-100 KWIKPEN INJ 3ML]    Last Written Prescription Date:  11/29/2018      THIS MAY BE A DUPLICATE.   Last Fill Quantity: 15 mL    ,  # refills: 3   Last Office Visit: 9/12/2018   Future Office Visit:      15 mL 0     Sig: ADMINISTER 35 UNITS UNDER THE SKIN TWICE DAILY    Intermediate Acting Insulin Protocol Passed    12/5/2018  3:44 AM       Passed - Blood pressure less than 140/90 in past 6 months    BP Readings from Last 3 Encounters:   11/29/18 130/58   11/13/18 129/63   11/13/18 129/63          Passed - LDL on file in past 12 months    Recent Labs   Lab Test  03/19/18   1404   LDL  111*          Passed - Microalbumin on file in past 12 months    Recent Labs   Lab Test  03/19/18   1420   MICROL  13   UMALCR  26.39*          Passed - Serum creatinine on file in past 12 months    Recent Labs   Lab Test  11/12/18   1220   CR  1.53*          Passed - HgbA1C in past 3 or 6 months    If HgbA1C is 8 or greater, it needs to be on file within the past 3 months.  If less than 8, must be on file within the past 6 months.     Recent Labs   Lab Test 11/29/18  09/10/18   0614   A1C   --   5.2   HEMOGLOBINA1  5.5   --           Passed - Patient is age 18 or older       Passed - Recent (6 mo) or future (30 days) visit within the authorizing provider's specialty    Patient had office visit in the last 6 months or has a visit in the next 30 days with authorizing provider or within the authorizing provider's specialty.  See \"Patient Info\" tab in inbasket, or \"Choose Columns\" in Meds & Orders section of the refill encounter.              "

## 2018-12-06 RX ORDER — INSULIN HUMAN 100 [IU]/ML
INJECTION, SUSPENSION SUBCUTANEOUS
Qty: 15 ML | Refills: 1 | Status: SHIPPED | OUTPATIENT
Start: 2018-12-06 | End: 2018-12-06
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
## 2018-12-06 NOTE — TELEPHONE ENCOUNTER
LOV:9/12/20108    Prescription approved per [facility] Refill Protocol.  Thanks! Milagro Sarah RN

## 2018-12-07 ENCOUNTER — HOSPITAL ENCOUNTER (OUTPATIENT)
Dept: CARDIAC REHAB | Facility: CLINIC | Age: 73
End: 2018-12-07
Attending: NURSE PRACTITIONER
Payer: MEDICARE

## 2018-12-07 PROCEDURE — 93798 PHYS/QHP OP CAR RHAB W/ECG: CPT | Mod: KX

## 2018-12-07 PROCEDURE — 40000116 ZZH STATISTIC OP CR VISIT

## 2018-12-10 DIAGNOSIS — I50.30 (HFPEF) HEART FAILURE WITH PRESERVED EJECTION FRACTION (H): ICD-10-CM

## 2018-12-10 LAB
ANION GAP SERPL CALCULATED.3IONS-SCNC: 6 MMOL/L (ref 3–14)
BUN SERPL-MCNC: 27 MG/DL (ref 7–30)
CALCIUM SERPL-MCNC: 9 MG/DL (ref 8.5–10.1)
CHLORIDE SERPL-SCNC: 110 MMOL/L (ref 94–109)
CO2 SERPL-SCNC: 27 MMOL/L (ref 20–32)
CREAT SERPL-MCNC: 1.43 MG/DL (ref 0.52–1.04)
GFR SERPL CREATININE-BSD FRML MDRD: 36 ML/MIN/1.7M2
GLUCOSE SERPL-MCNC: 74 MG/DL (ref 70–99)
POTASSIUM SERPL-SCNC: 4.3 MMOL/L (ref 3.4–5.3)
SODIUM SERPL-SCNC: 143 MMOL/L (ref 133–144)

## 2018-12-11 VITALS — WEIGHT: 293 LBS | BODY MASS INDEX: 48.26 KG/M2

## 2018-12-11 ASSESSMENT — 6 MINUTE WALK TEST (6MWT)
GENDER SELECTION: FEMALE
TOTAL DISTANCE WALKED (FT): 560

## 2018-12-11 NOTE — PROGRESS NOTES
Physician cosignature/electronic signature indicates approval of this ITP document. I have established, reviewed and made necessary changes to the individualized treatment plan and exercise prescription for this patient.   12/11/18 1200   Session   Session 30 Day Individualized Treatment Plan   Certified through this date 01/09/19   Cardiac Rehab Assessment   Cardiac Rehab Assessment Mariel Ribeiro is a 73 year old female with severe triple vessel CAD, DM on insulin pump, COPD and previous PCI of LAD, hypertension, morbid obesity and sleep apnea, Mariel Ribeiro is a 73 year old female with a past medical history of DM2, HTN, CKD3, HLD, COPD, fibromyalgia, anxiety, depression, lymphedema, and multivessel CAD who presented with with unstable angina. Patient was originally scheduled to see Dr. Mason in clinic, however given her recurrent chest pain, CABG was moved to 7/2/2018. Patient was sent in via EMS from her home after a home health care nurse check her blood pressure today and she was noted to be hypotensive.  Patient here denies any focal weakness or numbness, denies any headache, cough or chest pain.  Patient reports that her blood pressure will intermittently drop; however, she typically just drinks some fluids and her blood pressure will improve.  Per review of patient's chart, patient has been seeing neurology recently and is being worked up for presyncopal episodes associated with weakness. 12/11 Pt not present for update, she does report that she is feeling stronger. She does have multiple medical complications, and attendance is irregular. She requested to decr. to 2x wk due to freq chiropracter apts. Discussed benefit of coming consistly to see progress, and offered to put on hold. She plans to continue 3x wk. Pt will benefit from skilled CR services to assist with act/ex progression and with behavior change to make lifestyle changes.    General Information   Treatment Diagnosis Coronary Artery  "Bypass Surgery  (X3)   Date of Treatment Diagnosis 07/02/18   Significant Past CV History Previous MI;Previous PCI;Clinical significant depression or depressive symptoms   Comorbidities DM   Other Medical History .pmh   Lead up symptoms Unstable Angina chest pain   Hospital Location [facility]   Hospital Discharge Date 07/23/18   Signs and Symptoms Post Hospital Discharge Fatigue;Lightheadedness;Other (see comments)  (Pt has been hospitalized 3x since her DC 7/23)   Outpatient Cardiac Rehab Start Date 11/26/18   Primary Physician Rafaela William   Primary Physician Follow Up Completed   Cardiologist MD Wolf Cindy   Cardiologist Follow Up Completed   Ejection Fraction 40-45%  (40-45% 8/15/18., 55-60% 7/13/2018)   Risk Stratification Moderate   Summary of Cath Report   Summary of Cath Report Available   Date Performed 06/29/18   Left Main Normal   LAD LIMA   D1 <25   D2 <25   LCX pLCX 50-75%   OM SVG-OMA2   RCA pRCA 75-90%   PDA SVG-rPDA   Living and Work Status    Living Arrangements and Social Status house   Support System Live with an adult   Return to Employment Retired   Occupation    Preventative Medications   CMS recommended medications Antiplatelets;Beta Blocker;Lipid Lowering;Influenza vaccination;Pneumonia vaccination   Fall Risk Screen   Fall screen completed by Cardiac Rehab   Have you fallen 2 or more times in the past year? Yes   Have you fallen and had an injury in the past year? Yes   Is patient a fall risk? Yes   Fall screen comments Pt received in home PT after surgery , 12/11 To give \"Fall prevention at Home\" handout   Pain   Patient Currently in Pain Yes   Pain Location Low back   Pain Rating 7/10   Pain Description Ache;Sharp   Physical Assessments   Incisions Not applicable   Edema +4 Severe  (Pt has lymphedema)   Right Lung Sounds normal   Left Lung Sounds normal   Limitations Arthritis  (neuropathy in feet, bilateral knee replacement and left hip ) "   Individualized Treatment Plan   Monitored Sessions Scheduled 24   Monitored Sessions Attended 4   Oxygen   Supplemental Oxygen needed No   Nutrition Management - Weight Management   Assessment Re-assessment   Age 73   Weight 135.6 kg (299 lb)   Initial Rate Your Plate Score. Dietary tool to assess eating patterns. Scores range from 24 to 72. The higher the score the healthier the eating pattern. 46   Nutrition Management - Lipids   Lipids Labs Available   Date 03/19/18   Total Cholesterol 203   Triglycerides 212   HDL 5      Prescribed Lipid Medication Yes   Statin Intensity High Intensity   Nutrition Management - Diabetes   Diabetes Type II   Diabetes Medication Prescribed Yes, Insulin   Hb A1C Date: 09/10/18   Hb A1C Result: 5.2   Nutrition Management Summary   Dietary Recommendations Low Fat;Low Cholesterol;Low Sodium   Stages of Change for Diet Compliance Pre-Contemplation   Interventions Planned Attend Nutrition Education Class(es)   Psychosocial Management   Psychosocial Assessment Re-assessment   Is there history of clinical depression or increased risk of depression? History of clinical depression   Current Level of Stress per Patient Report Moderate    Current Coping Skills Other (see comments)  (Sleep, [place])   Initial Patient Health Questionnaire -9 Score (PHQ-9) for depression. 5-9 Minimal symptoms, 10-14 Minor depression, 15-19 Major depression, moderately severe, > 20 Major depression, severe  11   Initial [facility] Survey score.  Quality of Life:   If total score > 25 review individual areas where patient rated a 4 or 5.  Consider patients current medical condition and what role that plays on the score.   Adjust treatment protocol to improve areas of concern.  Consider the following:  PHQ9 score, DASI, and re-assessment within the next 30 days to assist with developing treatments.  37   Stages of Change Preparation   Interventions Planned Patient to verbalize understanding of behavioral  assessment results;Reassess PHQ-9 and/or [facility] COOP Surveys if outside of defined limits   Psychosocial Comments Pt reports that her stress is better since she has been able to resume driving. Pt is on 20mg of Lexapro   Other Core Components - Hypertension   History of or Diagnosis of Hypertension Yes   Currently taking Anti-Hypertensives Yes;Beta blocker   Other Core Components - Tobacco   History of Tobacco Use Yes   Quit Date or Planned Quit Date 01/01/89   Tobacco Use Status Former (Quit > 6 mo ago)   Tobacco Habit Cigarettes   Tobacco Use per Day (average) 1.5   Years of Tobacco Use 30   Stages of Change NA   Other Core Components Summary   Interventions Planned Instruct patient on the DASH diet;Provide information on home blood pressure monitoring;Attend education class on Blood Pressure;List benefits of weight management;Educate on importance of maintaining low sodium diet   Activity/Exercise History   Activity/Exercise Assessment Re-assessment   Activity/Exercise Status prior to event? Sedentary   Number of Days Currently participating in Moderate Physical Activity? 2   Number of Days Currently performing  Aerobic Exercise (including rehab)? 7   Number of Minutes per Session Currently of Aerobic Exercise (average)? 15  (pt uses a health rider and Ab Do'er)   Current Stage of Change (Physical Activity) Preparation   Current Stage of Change (Aerobic Exercise) Preparation   Patient Goals Goal #1;Goal #2   Goal #1 Description Pt will be able to increase her lower leg strength to be able to resume walking her dog 1 mile.    Goal #1 Target Date 01/24/19   Goal #1 Progress Towards Goal Pt will focus on improvin gher leg strength though the use of leg press, TDM and NS  12/11 Pt has participated in seated ex for 15 min at 2METs, she is able to walk short distance with her cane.    Goal #2 Description Pt will focus on improving her upperbody strength to return safely to her ADL's, yard work, shoveling, raking  leaves   Goal #2 Target Date 01/10/19   Exercise Assessment   6 Minute Walk Predicted - Gender Selection Female   Initial 6 Minute Walk Distance (Feet) 560 ft   Resting HR 54 bpm   Exercise HR 80 bpm   Post Exercise HR 65 bpm   Resting /68  (BP on forearm)   Exercise /76   Post Exercise /64   Pre  mg/dL   Effort Rating 8   Current MET Level 2.0   MET Level Goal 3-3.5   ECG Rhythm Sinus rhythm;Sinus bradycardia   Ectopy None   Current Symptoms Denies symptoms   Limitations/Restrictions None   Exercise Prescription   Mode Airdyne;Nustep;Weights;Ambulation   Duration/Time 15-30 min   Frequency 3 daysweek   THR (85% of age predicted max HR) 125.8   OMNI Effort Rating (0-10 Scale) 4-6/10   Progression Intermittent bouts;Total exercise time of 20-30 minutes;Progress peak intensity by 1/4 MET per week   Recommended Home Exercise   Type of Exercise Walking;Other (comments)   Frequency (days per week) Daily   Duration (minutes per session) 15-30 min   Effort Rating Recommended 4-6/10   Current Home Exercise   Type of Exercise Other (comments)   Frequency (days per week) Health rider, Ab machine   Duration (minutes per session) 15   Follow-up/On-going Support   Provider follow-up needed on the following No follow-up needed   Learning Assessment   Learner Patient   Primary Language English   Preferred Learning Style Listening;Reading;Demonstration;Pictures/Video   Barriers to Learning Cognitive;Hearing  (Hearing loss, self diagnosed congnitive learing issues)   Patient Education   Education recommended Anatomy and Physiology of the Heart;Blood Pressure;Exercise Principles;Medication Overview;Nutrition;Risk Factors;Stress Management   Education classes attended Medication Overview

## 2018-12-11 NOTE — ADDENDUM NOTE
Encounter addended by: Ena Huerta, OT on: 12/11/2018 12:56 PM   Actions taken: Flowsheet data copied forward, Sign clinical note, Flowsheet accepted

## 2018-12-12 ENCOUNTER — OFFICE VISIT (OUTPATIENT)
Dept: CARDIOLOGY | Facility: CLINIC | Age: 73
End: 2018-12-12
Attending: NURSE PRACTITIONER
Payer: MEDICARE

## 2018-12-12 ENCOUNTER — OFFICE VISIT (OUTPATIENT)
Dept: OPHTHALMOLOGY | Facility: CLINIC | Age: 73
End: 2018-12-12
Payer: MEDICARE

## 2018-12-12 VITALS
HEIGHT: 66 IN | HEART RATE: 54 BPM | OXYGEN SATURATION: 99 % | SYSTOLIC BLOOD PRESSURE: 144 MMHG | DIASTOLIC BLOOD PRESSURE: 64 MMHG | BODY MASS INDEX: 48.26 KG/M2

## 2018-12-12 DIAGNOSIS — F07.81 POST CONCUSSION SYNDROME: ICD-10-CM

## 2018-12-12 DIAGNOSIS — H02.831 DERMATOCHALASIS OF BOTH UPPER EYELIDS: ICD-10-CM

## 2018-12-12 DIAGNOSIS — H35.3112 INTERMEDIATE STAGE NONEXUDATIVE AGE-RELATED MACULAR DEGENERATION OF RIGHT EYE: ICD-10-CM

## 2018-12-12 DIAGNOSIS — H02.834 DERMATOCHALASIS OF BOTH UPPER EYELIDS: ICD-10-CM

## 2018-12-12 DIAGNOSIS — H52.13 MYOPIA OF BOTH EYES: ICD-10-CM

## 2018-12-12 DIAGNOSIS — I50.32 CHRONIC DIASTOLIC HEART FAILURE (H): Chronic | ICD-10-CM

## 2018-12-12 DIAGNOSIS — H04.123 DRY EYE SYNDROME OF BOTH EYES: ICD-10-CM

## 2018-12-12 DIAGNOSIS — E11.3292 MILD NONPROLIFERATIVE DIABETIC RETINOPATHY OF LEFT EYE WITHOUT MACULAR EDEMA ASSOCIATED WITH TYPE 2 DIABETES MELLITUS (H): Primary | ICD-10-CM

## 2018-12-12 PROCEDURE — G0463 HOSPITAL OUTPT CLINIC VISIT: HCPCS | Mod: ZF

## 2018-12-12 PROCEDURE — 99214 OFFICE O/P EST MOD 30 MIN: CPT | Mod: ZP | Performed by: NURSE PRACTITIONER

## 2018-12-12 RX ORDER — ANTIOX #8/OM3/DHA/EPA/LUT/ZEAX 250-2.5 MG
1 CAPSULE ORAL 2 TIMES DAILY
Qty: 120 CAPSULE | Refills: 11 | Status: SHIPPED | OUTPATIENT
Start: 2018-12-12 | End: 2018-12-12

## 2018-12-12 RX ORDER — BUMETANIDE 2 MG/1
2 TABLET ORAL
Qty: 45 TABLET | Refills: 3 | Status: ON HOLD | OUTPATIENT
Start: 2018-12-12 | End: 2019-05-02

## 2018-12-12 RX ORDER — LOSARTAN POTASSIUM 25 MG/1
50 TABLET ORAL DAILY
Qty: 60 TABLET | Refills: 3 | Status: SHIPPED | OUTPATIENT
Start: 2018-12-12 | End: 2019-02-25

## 2018-12-12 ASSESSMENT — EXTERNAL EXAM - RIGHT EYE: OD_EXAM: NORMAL

## 2018-12-12 ASSESSMENT — REFRACTION_MANIFEST
OD_ADD: +2.50
OS_ADD: +2.50
OS_AXIS: 114
OS_CYLINDER: +1.25
OD_CYLINDER: +1.75
OS_SPHERE: -3.00
OD_SPHERE: -2.75
OD_AXIS: 077

## 2018-12-12 ASSESSMENT — REFRACTION_WEARINGRX
OD_AXIS: 060
OS_AXIS: 130
OS_ADD: +2.50
OD_SPHERE: -3.00
OS_SPHERE: -3.00
OD_CYLINDER: +1.75
OD_ADD: +2.50
SPECS_TYPE: PAL
OS_CYLINDER: +1.25

## 2018-12-12 ASSESSMENT — TONOMETRY
OS_IOP_MMHG: 12
OD_IOP_MMHG: 12
IOP_METHOD: ICARE

## 2018-12-12 ASSESSMENT — CUP TO DISC RATIO
OD_RATIO: 0.3
OS_RATIO: 0.45

## 2018-12-12 ASSESSMENT — VISUAL ACUITY
OS_CC: J5
OS_PH_CC: 20/40
OD_PH_CC+: -1
OS_PH_CC+: -2
CORRECTION_TYPE: GLASSES
OD_CC+: -1
OD_CC: J5
OD_CC: 20/125
OS_CC: 20/60
METHOD: SNELLEN - LINEAR
OD_PH_CC: 20/60

## 2018-12-12 ASSESSMENT — CONF VISUAL FIELD
OD_NORMAL: 1
OS_NORMAL: 1
METHOD: COUNTING FINGERS

## 2018-12-12 ASSESSMENT — PAIN SCALES - GENERAL: PAINLEVEL: NO PAIN (0)

## 2018-12-12 ASSESSMENT — EXTERNAL EXAM - LEFT EYE: OS_EXAM: NORMAL

## 2018-12-12 NOTE — TELEPHONE ENCOUNTER
FUTURE VISIT INFORMATION      FUTURE VISIT INFORMATION:    Date: 01/28/19    Time: 200pm    Location: [facility]  REFERRAL INFORMATION:    Referring provider:  Joao Diaz OD    Referring providers clinic:  [facility]    Reason for visit/diagnosis  Dermatochalasis- brow recruitment    RECORDS REQUESTED FROM:       Clinic name Comments Records Status Imaging Status   [facility] Notes in Epic per visit on 12/12/18 In EPIC
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
## 2018-12-12 NOTE — PROGRESS NOTES
Dermatochalasis- brow recruitment; refer  PCIOL monitor  Dryness- Xiidra  Diplopia and blur, stable- monitor  DM ret- mild  Mac degen    AREDs- Fish and peanut allergy flagged, consider Rx next montht.      Return in 1 month for dry eye f/u and refraction with ritter      History  HPI     Eye Exam For Diabetes     In both eyes.  Charactertized as  blurred vision and difficulty focusing.  Severity is mild.  Occurring constantly.  Since onset it is gradually worsening.  Treatments tried include artificial tears.  Response to treatment was no improvement.  Pain was noted as 0/10.              Comments     Pt states here for diabetic eye exam. Pt states current glasses from last year are not working well, they never worked out well. Pt having trouble with distance and near.   No pain. AT QID drops.    Lab Results       Component                Value               Date                       A1C                      5.2                 09/10/2018                 A1C                      9.3                 07/01/2018                 A1C                      8.2                 03/19/2018                 A1C                      7.3                 08/09/2017                 A1C                      7.2                 03/08/2017              Sania Hernandez COT 12:43 PM December 12, 2018             Last edited by Sania Hernandez, COT on 12/12/2018 12:48 PM. (History)          Assessment/Plan  (E11.3292) Mild nonproliferative diabetic retinopathy of left eye without macular edema associated with type 2 diabetes mellitus (H)  (primary encounter diagnosis)  Comment: Isolated blot heme left eye, no CSME  Plan:  Educated patient on clinical findings and the importance of continued management with primary care physician. Continue management as directed and return to clinic in 1 year for dilated exam, or sooner, as needed.    (H04.123) Dry eye syndrome of both eyes  Comment: Symptomatic with artificial tears  Plan: lifitegrast (XIIDRA)  5 % opthalmic solution         Educated patient on condition and clinical findings. Prescribed Xiidra bid both eyes. Continue use of artificial tears. Monitor symptoms and clinical findings at follow-up in 1 month.    (H02.831,  H02.834) Dermatochalasis of both upper eyelids  Comment: Patient symptomatic, significant brow recruitment  Plan:  Referred for oculoplastics consultation.    (H52.13) Myopia of both eyes  Comment: Myopia OU  Plan: REFRACTION [[id number]]         Vision slightly reduced compared to previous visits. Recheck refraction at 1 month follow-up. May be reduced due to ocular surface. Spectacle prescription withheld at this time.    (F07.81) Post concussion syndrome  Comment: Monocular diplopia and blur both eyes; symptoms stable  Plan:  Monitor annually.    (H35.3112) Intermediate stage nonexudative age-related macular degeneration of right eye  Comment: 1 large druse near fovea OD  Plan: DISCONTINUED: Multiple Vitamins-Minerals (PRESERVISION AREDS 2) CAPS         AREDS 2 supplement flagged due to peanut and fish allergies. Dr. Diaz to research if this is a contraindication.     Return to clinic in 1 month for refraction and dry eye follow-up.    Complete documentation of historical and exam elements from today's encounter can  be found in the full encounter summary report (not reduplicated in this progress  note). I personally obtained the chief complaint(s) and history of present illness. I  confirmed and edited as necessary the review of systems, past medical/surgical  history, family history, social history, and examination findings as documented by  others; and I examined the patient myself. I personally reviewed the relevant tests,  images, and reports as documented above. I formulated and edited as necessary the  assessment and plan and discussed the findings and management plan with the  patient and family.    Joao Diaz, OD, FAAO

## 2018-12-12 NOTE — PATIENT INSTRUCTIONS
"You were seen today in the Cardiovascular Clinic at the [facility].     Cardiology Providers you saw during your visit: Starla Saez NP      1. Please increase your losartan to 50 mg daily    2. Please reduce your Bumex to 2 mg every other day    3. Please have your blood pressure check and have labs drawn in about 7-10 days    4. Please return to see Dr. Wolf in March, as scheduled      Please limit your fluid intake to 2 L (64 ounces) daily.  2 Liters a day = 8.5 cups, or 72 ounces.  Please limit your salt intake to 2 grams a day or less.    If you gain 2# in 24 hours or 5# in one week call Serena Streeter RN so we can adjust your medications as needed over the phone.    Please feel free to call me with any questions or concerns.      Serena Streeter RN BSN CHFN  [facility]  Cardiology Care Coordinator-Heart Failure Clinic    Questions and [phone].   First press #1 for the University and then press #3 for \"Medical Questions\" to reach us Cardiology Nurses.     On Call Cardiologist for after hours or on weekends: [phone]   option #4 and ask to speak to the on-call Cardiologist. Inform them you are a CORE/heart failure patient at the [facility].        If you need a medication refill please contact your pharmacy.  Please allow 3 business days for your refill to be completed.  _______________________________________________________  C.O.R.E. CLINIC Cardiomyopathy, Optimization, Rehabilitation, Education   The C.O.R.E. CLINIC is a heart failure specialty clinic within the [facility] where you will work with specialized nurse practitioners dedicated to helping patients with heart failure carefully adjust medications, receive education, and learn who and when to call if symptoms develop. They specialize in helping you better understand your condition, slow the progression of your disease, improve the length and quality " of your life, help you detect future heart problems before they become life threatening, and avoid hospitalizations.  As always, thank you for trusting us with your health care needs!
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
## 2018-12-14 ENCOUNTER — HOSPITAL ENCOUNTER (OUTPATIENT)
Dept: CARDIAC REHAB | Facility: CLINIC | Age: 73
End: 2018-12-14
Attending: NURSE PRACTITIONER
Payer: MEDICARE

## 2018-12-14 DIAGNOSIS — Z79.4 TYPE 2 DIABETES MELLITUS WITH HYPERGLYCEMIA, WITH LONG-TERM CURRENT USE OF INSULIN (H): Primary | ICD-10-CM

## 2018-12-14 DIAGNOSIS — E11.65 TYPE 2 DIABETES MELLITUS WITH HYPERGLYCEMIA, WITH LONG-TERM CURRENT USE OF INSULIN (H): Primary | ICD-10-CM

## 2018-12-14 PROCEDURE — 40000116 ZZH STATISTIC OP CR VISIT

## 2018-12-14 PROCEDURE — 93798 PHYS/QHP OP CAR RHAB W/ECG: CPT | Mod: KX

## 2018-12-14 RX ORDER — REPAGLINIDE 1 MG/1
1 TABLET ORAL
Qty: 90 TABLET | Refills: 3 | Status: ON HOLD | COMMUNITY
Start: 2018-12-14 | End: 2019-05-02

## 2018-12-17 ENCOUNTER — HOSPITAL ENCOUNTER (OUTPATIENT)
Dept: CARDIAC REHAB | Facility: CLINIC | Age: 73
End: 2018-12-17
Attending: NURSE PRACTITIONER
Payer: MEDICARE

## 2018-12-17 ENCOUNTER — DOCUMENTATION ONLY (OUTPATIENT)
Dept: SLEEP MEDICINE | Facility: CLINIC | Age: 73
End: 2018-12-17

## 2018-12-17 PROCEDURE — 40000575 ZZH STATISTIC OP CARDIAC VISIT #2: Mod: KX

## 2018-12-17 PROCEDURE — 40000116 ZZH STATISTIC OP CR VISIT: Mod: KX | Performed by: OCCUPATIONAL THERAPIST

## 2018-12-17 PROCEDURE — 93798 PHYS/QHP OP CAR RHAB W/ECG: CPT | Mod: KX | Performed by: OCCUPATIONAL THERAPIST

## 2018-12-17 PROCEDURE — 93797 PHYS/QHP OP CAR RHAB WO ECG: CPT | Mod: KX

## 2018-12-17 NOTE — PROGRESS NOTES
Overnight oximetry study results downloaded and faxed with [employer] PAP download to ordering provider.

## 2018-12-18 ENCOUNTER — DOCUMENTATION ONLY (OUTPATIENT)
Dept: SLEEP MEDICINE | Facility: CLINIC | Age: 73
End: 2018-12-18

## 2018-12-18 NOTE — PROGRESS NOTES
30 DAY STM VISIT    Diagnostic AHI:   PSG AHI: 33.3    Subjective measures:   Pt states that she is struggling to wear the mask all night because she feels like the pressure is too high.  She will try shutting her machine off and on to start the pressure lower.  She will also check her mask fit.     Device type:  Bilevel    Mask type: Full Face Mask    Assessment: Pt not meeting objective benchmarks for leak and compliance  Patient meeting subjective benchmarks.       Action plan: 2 week STM recheck appt scheduled  Patient has scheduled a follow up visit with Dr. Girard on 1/30/18.           Objective measure goal  Compliance   Goal >70%  Leak   Goal < 24 lpm or 10% of the night in large leak   AHI  Goal < 5  Usage  Goal >240

## 2018-12-20 ENCOUNTER — ALLIED HEALTH/NURSE VISIT (OUTPATIENT)
Dept: NURSING | Facility: CLINIC | Age: 73
End: 2018-12-20
Payer: MEDICARE

## 2018-12-20 VITALS — DIASTOLIC BLOOD PRESSURE: 70 MMHG | SYSTOLIC BLOOD PRESSURE: 136 MMHG | HEART RATE: 75 BPM

## 2018-12-20 DIAGNOSIS — Z01.30 BP CHECK: Primary | ICD-10-CM

## 2018-12-20 DIAGNOSIS — I50.32 CHRONIC DIASTOLIC HEART FAILURE (H): Chronic | ICD-10-CM

## 2018-12-20 PROCEDURE — 99207 ZZC NO CHARGE NURSE ONLY: CPT

## 2018-12-20 PROCEDURE — 36415 COLL VENOUS BLD VENIPUNCTURE: CPT | Performed by: NURSE PRACTITIONER

## 2018-12-20 PROCEDURE — 80048 BASIC METABOLIC PNL TOTAL CA: CPT | Performed by: NURSE PRACTITIONER

## 2018-12-21 ENCOUNTER — CARE COORDINATION (OUTPATIENT)
Dept: CARDIOLOGY | Facility: CLINIC | Age: 73
End: 2018-12-21

## 2018-12-21 DIAGNOSIS — I50.32 CHRONIC DIASTOLIC HEART FAILURE (H): Primary | Chronic | ICD-10-CM

## 2018-12-21 LAB
ANION GAP SERPL CALCULATED.3IONS-SCNC: 8 MMOL/L (ref 3–14)
BUN SERPL-MCNC: 21 MG/DL (ref 7–30)
CALCIUM SERPL-MCNC: 9.3 MG/DL (ref 8.5–10.1)
CHLORIDE SERPL-SCNC: 110 MMOL/L (ref 94–109)
CO2 SERPL-SCNC: 23 MMOL/L (ref 20–32)
CREAT SERPL-MCNC: 1.22 MG/DL (ref 0.52–1.04)
GFR SERPL CREATININE-BSD FRML MDRD: 44 ML/MIN/{1.73_M2}
GLUCOSE SERPL-MCNC: 160 MG/DL (ref 70–99)
POTASSIUM SERPL-SCNC: 5 MMOL/L (ref 3.4–5.3)
SODIUM SERPL-SCNC: 141 MMOL/L (ref 133–144)

## 2018-12-21 RX ORDER — POTASSIUM CHLORIDE 750 MG/1
TABLET, EXTENDED RELEASE ORAL
Qty: 360 TABLET | Refills: 3 | Status: SHIPPED | OUTPATIENT
Start: 2018-12-21 | End: 2019-01-14

## 2018-12-21 NOTE — PROGRESS NOTES
Called Mariel to review labs. Per Mariel her current dosing of Potassium is 20 mEq Tues, Thurs, Sat and Sunday.  40 mEq on Monday, Wednesday and Friday. Gave following orders:    Date: 12/21/2018    Time of Call: 1:06 PM     Diagnosis:  Heart failure     [ VORB ] Ordering provider: Starla Saez NP  Order: Reduce Potassium to 10 mEq daily on Tuesday, Thursday, Saturday and Sunday. Take 20 mEq on MWF. Repeat BMP in 1 month.      Order received by: Rosina Mays RN      Follow-up/additional notes: BMP order placed. I told Mariel I would call her in about 3 weeks to help her make a lab appt at that time.

## 2018-12-24 ENCOUNTER — APPOINTMENT (OUTPATIENT)
Dept: GENERAL RADIOLOGY | Facility: CLINIC | Age: 73
End: 2018-12-24
Attending: EMERGENCY MEDICINE
Payer: MEDICARE

## 2018-12-24 ENCOUNTER — HOSPITAL ENCOUNTER (EMERGENCY)
Facility: CLINIC | Age: 73
Discharge: HOME | End: 2018-12-25
Attending: EMERGENCY MEDICINE | Admitting: EMERGENCY MEDICINE
Payer: MEDICARE

## 2018-12-24 VITALS
BODY MASS INDEX: 47.09 KG/M2 | TEMPERATURE: 97.9 F | HEART RATE: 96 BPM | SYSTOLIC BLOOD PRESSURE: 156 MMHG | HEIGHT: 66 IN | WEIGHT: 293 LBS | DIASTOLIC BLOOD PRESSURE: 64 MMHG | RESPIRATION RATE: 22 BRPM | OXYGEN SATURATION: 96 %

## 2018-12-24 DIAGNOSIS — M25.512 PAIN IN JOINT OF LEFT SHOULDER: ICD-10-CM

## 2018-12-24 DIAGNOSIS — R07.9 CHEST PAIN, UNSPECIFIED TYPE: ICD-10-CM

## 2018-12-24 LAB
ALBUMIN SERPL-MCNC: 3.2 G/DL (ref 3.4–5)
ALP SERPL-CCNC: 113 U/L (ref 40–150)
ALT SERPL W P-5'-P-CCNC: 49 U/L (ref 0–50)
ANION GAP SERPL CALCULATED.3IONS-SCNC: 4 MMOL/L (ref 3–14)
AST SERPL W P-5'-P-CCNC: 32 U/L (ref 0–45)
BASOPHILS # BLD AUTO: 0 10E9/L (ref 0–0.2)
BASOPHILS NFR BLD AUTO: 0.3 %
BILIRUB SERPL-MCNC: 0.3 MG/DL (ref 0.2–1.3)
BUN SERPL-MCNC: 22 MG/DL (ref 7–30)
CA-I BLD-SCNC: 5.2 MG/DL (ref 4.4–5.2)
CALCIUM SERPL-MCNC: 8.8 MG/DL (ref 8.5–10.1)
CHLORIDE SERPL-SCNC: 109 MMOL/L (ref 94–109)
CO2 BLDCOV-SCNC: 27 MMOL/L (ref 21–28)
CO2 SERPL-SCNC: 29 MMOL/L (ref 20–32)
CREAT SERPL-MCNC: 1.19 MG/DL (ref 0.52–1.04)
DIFFERENTIAL METHOD BLD: ABNORMAL
EOSINOPHIL # BLD AUTO: 0.2 10E9/L (ref 0–0.7)
EOSINOPHIL NFR BLD AUTO: 2.4 %
ERYTHROCYTE [DISTWIDTH] IN BLOOD BY AUTOMATED COUNT: 15.4 % (ref 10–15)
GFR SERPL CREATININE-BSD FRML MDRD: 45 ML/MIN/{1.73_M2}
GLUCOSE BLD-MCNC: 127 MG/DL (ref 70–99)
GLUCOSE BLDC GLUCOMTR-MCNC: 116 MG/DL (ref 70–99)
GLUCOSE SERPL-MCNC: 140 MG/DL (ref 70–99)
HCT VFR BLD AUTO: 38.6 % (ref 35–47)
HCT VFR BLD CALC: 34 %PCV (ref 35–47)
HGB BLD CALC-MCNC: 11.6 G/DL (ref 11.7–15.7)
HGB BLD-MCNC: 11.7 G/DL (ref 11.7–15.7)
IMM GRANULOCYTES # BLD: 0 10E9/L (ref 0–0.4)
IMM GRANULOCYTES NFR BLD: 0.2 %
LYMPHOCYTES # BLD AUTO: 1.8 10E9/L (ref 0.8–5.3)
LYMPHOCYTES NFR BLD AUTO: 28.5 %
MCH RBC QN AUTO: 27.1 PG (ref 26.5–33)
MCHC RBC AUTO-ENTMCNC: 30.3 G/DL (ref 31.5–36.5)
MCV RBC AUTO: 90 FL (ref 78–100)
MONOCYTES # BLD AUTO: 0.3 10E9/L (ref 0–1.3)
MONOCYTES NFR BLD AUTO: 5 %
NEUTROPHILS # BLD AUTO: 4.1 10E9/L (ref 1.6–8.3)
NEUTROPHILS NFR BLD AUTO: 63.6 %
NRBC # BLD AUTO: 0 10*3/UL
NRBC BLD AUTO-RTO: 0 /100
PCO2 BLDV: 48 MM HG (ref 40–50)
PH BLDV: 7.36 PH (ref 7.32–7.43)
PLATELET # BLD AUTO: 126 10E9/L (ref 150–450)
PO2 BLDV: 29 MM HG (ref 25–47)
POTASSIUM BLD-SCNC: 4.1 MMOL/L (ref 3.4–5.3)
POTASSIUM SERPL-SCNC: 4.5 MMOL/L (ref 3.4–5.3)
PROT SERPL-MCNC: 6.3 G/DL (ref 6.8–8.8)
RBC # BLD AUTO: 4.31 10E12/L (ref 3.8–5.2)
SAO2 % BLDV FROM PO2: 51 %
SODIUM BLD-SCNC: 144 MMOL/L (ref 133–144)
SODIUM SERPL-SCNC: 143 MMOL/L (ref 133–144)
TROPONIN I SERPL-MCNC: <0.015 UG/L (ref 0–0.04)
TROPONIN I SERPL-MCNC: <0.015 UG/L (ref 0–0.04)
WBC # BLD AUTO: 6.4 10E9/L (ref 4–11)

## 2018-12-24 PROCEDURE — 93005 ELECTROCARDIOGRAM TRACING: CPT | Performed by: EMERGENCY MEDICINE

## 2018-12-24 PROCEDURE — 99285 EMERGENCY DEPT VISIT HI MDM: CPT | Mod: 25 | Performed by: EMERGENCY MEDICINE

## 2018-12-24 PROCEDURE — 40000498 ZZHCL STATISTIC POTASSIUM ED POCT

## 2018-12-24 PROCEDURE — 82803 BLOOD GASES ANY COMBINATION: CPT

## 2018-12-24 PROCEDURE — 25000132 ZZH RX MED GY IP 250 OP 250 PS 637: Mod: GY | Performed by: EMERGENCY MEDICINE

## 2018-12-24 PROCEDURE — 71046 X-RAY EXAM CHEST 2 VIEWS: CPT

## 2018-12-24 PROCEDURE — 84484 ASSAY OF TROPONIN QUANT: CPT | Performed by: EMERGENCY MEDICINE

## 2018-12-24 PROCEDURE — 94640 AIRWAY INHALATION TREATMENT: CPT | Performed by: EMERGENCY MEDICINE

## 2018-12-24 PROCEDURE — 25000125 ZZHC RX 250: Performed by: EMERGENCY MEDICINE

## 2018-12-24 PROCEDURE — 85025 COMPLETE CBC W/AUTO DIFF WBC: CPT | Performed by: EMERGENCY MEDICINE

## 2018-12-24 PROCEDURE — 40000502 ZZHCL STATISTIC GLUCOSE ED POCT

## 2018-12-24 PROCEDURE — A9270 NON-COVERED ITEM OR SERVICE: HCPCS | Mod: GY | Performed by: EMERGENCY MEDICINE

## 2018-12-24 PROCEDURE — 00000146 ZZHCL STATISTIC GLUCOSE BY METER IP

## 2018-12-24 PROCEDURE — 40000497 ZZHCL STATISTIC SODIUM ED POCT

## 2018-12-24 PROCEDURE — 93010 ELECTROCARDIOGRAM REPORT: CPT | Mod: Z6 | Performed by: EMERGENCY MEDICINE

## 2018-12-24 PROCEDURE — 82330 ASSAY OF CALCIUM: CPT

## 2018-12-24 PROCEDURE — 80053 COMPREHEN METABOLIC PANEL: CPT | Performed by: EMERGENCY MEDICINE

## 2018-12-24 PROCEDURE — 40000501 ZZHCL STATISTIC HEMATOCRIT ED POCT

## 2018-12-24 RX ORDER — ALBUTEROL SULFATE 0.83 MG/ML
2.5 SOLUTION RESPIRATORY (INHALATION) ONCE
Status: DISCONTINUED | OUTPATIENT
Start: 2018-12-24 | End: 2018-12-25 | Stop reason: HOSPADM

## 2018-12-24 RX ORDER — ALBUTEROL SULFATE 0.83 MG/ML
2.5 SOLUTION RESPIRATORY (INHALATION) ONCE
Status: COMPLETED | OUTPATIENT
Start: 2018-12-24 | End: 2018-12-24

## 2018-12-24 RX ORDER — ASPIRIN 81 MG/1
324 TABLET, CHEWABLE ORAL ONCE
Status: COMPLETED | OUTPATIENT
Start: 2018-12-24 | End: 2018-12-24

## 2018-12-24 RX ORDER — PREDNISONE 20 MG/1
40 TABLET ORAL ONCE
Status: COMPLETED | OUTPATIENT
Start: 2018-12-24 | End: 2018-12-24

## 2018-12-24 RX ORDER — AZITHROMYCIN 250 MG/1
TABLET, FILM COATED ORAL
Qty: 6 TABLET | Refills: 0 | Status: SHIPPED | OUTPATIENT
Start: 2018-12-24 | End: 2019-03-11

## 2018-12-24 RX ORDER — PREDNISONE 20 MG/1
TABLET ORAL
Qty: 8 TABLET | Refills: 0 | Status: SHIPPED | OUTPATIENT
Start: 2018-12-24 | End: 2019-03-11

## 2018-12-24 RX ADMIN — ALBUTEROL SULFATE 2.5 MG: 2.5 SOLUTION RESPIRATORY (INHALATION) at 16:29

## 2018-12-24 RX ADMIN — ASPIRIN 81 MG 324 MG: 81 TABLET ORAL at 16:28

## 2018-12-24 RX ADMIN — PREDNISONE 40 MG: 20 TABLET ORAL at 16:29

## 2018-12-24 ASSESSMENT — ENCOUNTER SYMPTOMS
CONFUSION: 0
ABDOMINAL PAIN: 0
COLOR CHANGE: 0
COUGH: 1
SHORTNESS OF BREATH: 1
NECK STIFFNESS: 0
EYE REDNESS: 0
ARTHRALGIAS: 0
HEADACHES: 0
FEVER: 0
DIFFICULTY URINATING: 0

## 2018-12-24 ASSESSMENT — MIFFLIN-ST. JEOR: SCORE: 1905.68
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
## 2018-12-24 NOTE — ED AVS SNAPSHOT
[facility], [place], Emergency Department  [address] [place] [id number]  Phone:  [phone]                                    Mariel Ribeiro   MRN: [id number]    Department:  [facility], Emergency Department   Date of Visit:  12/24/2018           After Visit Summary Signature Page    I have received my discharge instructions, and my questions have been answered. I have discussed any challenges I see with this plan with the nurse or doctor.    ..........................................................................................................................................  Patient/Patient Representative Signature      ..........................................................................................................................................  Patient Representative Print Name and Relationship to Patient    ..................................................               ................................................  Date                                   Time    ..........................................................................................................................................  Reviewed by Signature/Title    ...................................................              ..............................................  Date                                               Time          22EPIC Rev 08/18

## 2018-12-24 NOTE — ED TRIAGE NOTES
BIBA with c/o SOB and non productive cough for a couple days. Patient was 97% on room air on arrival to scene, duoneb given. Patient also c/o intermittent chest pain. Hx COPD.

## 2018-12-25 NOTE — DISCHARGE INSTRUCTIONS
Continue your usual meds  Take prednisone once daily for the next 4 days  Take your albuterol inhaler 2 puffs every 4 hours as needed for shortness of breath  Take azithromycin as prescribed  Follow-up with your primary doctor soon as possible  Return to the emergency department for worsening shortness of breath, chest pain, nausea/vomiting, fever, coughing up blood, or if you have any new or changing symptoms or concerns

## 2018-12-26 ENCOUNTER — TELEPHONE (OUTPATIENT)
Dept: FAMILY MEDICINE | Facility: CLINIC | Age: 73
End: 2018-12-26

## 2018-12-26 ENCOUNTER — PATIENT OUTREACH (OUTPATIENT)
Dept: CARE COORDINATION | Facility: CLINIC | Age: 73
End: 2018-12-26

## 2018-12-26 DIAGNOSIS — R06.02 SOB (SHORTNESS OF BREATH): Primary | ICD-10-CM

## 2018-12-26 LAB
INTERPRETATION ECG - MUSE: NORMAL
INTERPRETATION ECG - MUSE: NORMAL

## 2018-12-26 NOTE — TELEPHONE ENCOUNTER
Unfortunately the call from this morning was never routed. By the time triage received message, Dr. William was done for the day. Returned call to patient and scheduled for next week. Reviewed when to seek emergency care. Pt verbalized understanding. Will call back to triage RNs if symptoms develop.    Elissa Magana, RN, BSN   [facility]

## 2018-12-26 NOTE — PROGRESS NOTES
Clinic Care Coordination Contact    Clinic Care Coordination Contact  OUTREACH    Referral Information:  Referral Source: ED Follow-Up    Primary Diagnosis: Respiratory Disorders - other(weakness low blood sugar )    Chief Complaint   Patient presents with     Clinic Care Coordination - Post Hospital     Clinic Care Coordination RN         Universal Utilization: ED visit [facility] 12/24  SOB cough   Clinic Utilization  Difficulty keeping appointments:: No  Compliance Concerns: No  No PCP office visit in Past Year: No  Utilization    Last refreshed: 12/25/2018  7:47 AM:  Hospital Admissions 4           Last refreshed: 12/25/2018  7:47 AM:  ED Visits 2           Last refreshed: 12/25/2018  7:47 AM:  No Show Count (past year) 3              Current as of: 12/25/2018  7:47 AM              Clinical Concerns:  Current Medical Concerns:  Patient states she left the ED AMA due to the tests were negative and she was tired of lying on the bed for 2 hours waiting.  Patient states she previously had a fall where she fell off the shower bench the night before and hit her chest  Patient reports a non productive cough  Patient is on Prednisone and an antibiotic   Pain  Pain (GOAL):: Yes  Location of chronic pain:: Back  Progression: Unchanged  Health Maintenance Reviewed:    Clinical Pathway: Clinic Care Coordination COPD Assessment    Discharge:      Day of hospital discharge: 12/24/18  What recommendations were made for follow up after your recent hospitalization? Prednisone antibiotic and to see PCP   Have the follow up appointments been scheduled? Yes    Transportation concerns (GOAL):: No  Is there a referral for Pulmonary Rehab? No    Symptom Review:    How have you been feeling now that you are home? better  Are you having any increased shortness of breath? No       Do you have a COPD Action Plan? Yes       Is the COPD Action Plan on refrigerator or available: Yes    What number would you call if you were in  the YELLOW zones:  [phone]    Medications:    Were you started on any new medications?  Yes, Prednisone and  antibiotic   Were any of your previous medications changed? No  Do you have all of your medications? Yes,     Are you medications effective in controlling your symptoms? Yes,   Are you currently on Prednisone (* does pt understand the tapering instructions)?  Yes  For patients with DM:       Activity:    How much activity can you do before you are SOB? Pacing activity     Emotions/Lifestyle:    Do you smoke?  reports that she quit smoking about 29 years ago. Her smoking use included cigarettes. She smoked 0.00 packs per day for 27.00 years. she has never used smokeless tobacco.        Medication Management:  No change  On other medications added Prednisone and Antibiotic    Functional Status:  Bed or wheelchair confined:: No  Fallen 2 or more times in the past year?: No  Any fall with injury in the past year?: Yes    Living Situation: Lives with her room mate        Diet/Exercise/Sleep:  Food Insecurity: No  Tube Feeding: No  Exercise:: Unable to exercise  Inadequate activity/exercise (GOAL):: No  Significant changes in sleep pattern (GOAL): No    Transportation:  Transportation concerns (GOAL):: No  Transportation means:: Family       Supplies used at home:: Nebulizer tubing          Goals:   Goals        General    Medical (pt-stated)     Notes - Note created  12/26/2018 10:55 AM by Senia Durán RN    Goal Statement: I will monitor shortness of breath episodes   Measure of Success: More energy for daily activity   Supportive Steps to Achieve:   I will finish course of antibiotic and Prednisone   I will seek medical help if experiencing increased shortness of breath episodes   Barriers: None identified   Strengths: supportive room mate   Date to Achieve By: 1-2 weeks   Patient expressed understanding of goal: Yes       Pain Management (pt-stated)     Notes - Note edited  11/30/2018 11:16 AM by  Senia Durán, RN    Goal Statement: I will decrease back pain   Measure of Success: Increased mobility with daily activity   Supportive Steps to Achieve:   I will start taking Tylenol 100 mg 3 times a day per Dr William   I will use heat ice and rest   I will use my cane for safety  Barriers: Deconditioned   Strengths: Continues home care services   Date to Achieve By: to be determined   Patient expressed understanding of goal: yes                Outreach Frequency: weekly  Future Appointments              Today 1, Ur Cardiac Rehab [facility], [place], [facility] Rehabilitation, [place]    In 2 days 1, Ur Cardiac Rehab [facility], [place], [facility], [place]    In 5 days 1, Ur Cardiac Rehab [facility], [place], [facility], [place]    In 1 week 1, Ur Cardiac Rehab [facility], [place], [facility], [place]    In 1 week 1, Ur Cardiac Rehab [facility], [place], [facility], [place]    In 1 week 1, Ur Cardiac Rehab [facility], [place], [facility], [place]    In 2 weeks 1, Ur Cardiac Rehab [facility], [place], [facility], [place]    In 2 weeks 1, Ur Cardiac Rehab [facility], [place], [facility], [place]    In 2 weeks 1, Ur Cardiac Rehab [facility], [place], [facility], [place]    In 2 weeks Joao Diaz OD [facility] Ophthalmology, [facility]    In 3 weeks 1, Ur Cardiac Rehab [facility], [place], [facility], [place]    In 3 weeks 1, Ur Cardiac Rehab [facility], [place], [facility], [place]    In 3 weeks 1, Ur Cardiac Rehab [facility], [place], [facility], [place]    In 4 weeks 1, Ur Cardiac Rehab [facility], [place], [facility], [place]    In 1 month Joao Mejia MD [facility] Ophthalmology, [facility]    In 1 month Noah Girard MD [place] [facility]    In 2 months  LAB [facility] Lab, [facility]    In 2 months Stephanie Wolf MD [facility] Heart Care, [facility]    In 3  Keven Phelan MD [facility] Endocrinology, [facility]          Plan: CC will follow up in 1-2 weeks     Senia Durán RN / Clinical Care Coordinator     [facility]   [email or web address]  Office :  [phone] / Fax :  [phone]
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
## 2018-12-26 NOTE — TELEPHONE ENCOUNTER
"The patient was seen in the ER 12/24 and was advised to see her PCP within 2 days. The patient is asking if PCP can \"squeeze her in today\".  Please follow up with the patient.  "

## 2018-12-27 NOTE — TELEPHONE ENCOUNTER
Lyrica      Last Written Prescription Date:  7/22/18  Last Fill Quantity: 90,   # refills: 0  Last Office Visit: 9/12/18  Future Office visit:    Next 5 appointments (look out 90 days)    Jan 02, 2019  1:20 PM CST  SHORT [facility] [address] [place] [address]  [phone]           Routing refill request to provider for review/approval because:  Drug not on the [facility], [facility] or  Health refill protocol or controlled substance

## 2018-12-27 NOTE — TELEPHONE ENCOUNTER
It does not look like this has been prescribed for over 5 months. Has she been taking it? At which dose? We could fill for a month if she I indeed on it.   Colby Ramos

## 2018-12-28 NOTE — TELEPHONE ENCOUNTER
Writer spoke with patient and she is taking Lyrica 100 mg tab BID.     Routing refill request to provider for review/approval because:  Drug not on the [facility] refill protocol     Thanks! Milagro Sarah RN

## 2018-12-29 DIAGNOSIS — E11.22 TYPE 2 DIABETES MELLITUS WITH DIABETIC CHRONIC KIDNEY DISEASE (H): ICD-10-CM

## 2018-12-29 DIAGNOSIS — E11.22 TYPE 2 DIABETES MELLITUS WITH STAGE 3 CHRONIC KIDNEY DISEASE, WITH LONG-TERM CURRENT USE OF INSULIN (H): Primary | Chronic | ICD-10-CM

## 2018-12-29 DIAGNOSIS — Z79.4 TYPE 2 DIABETES MELLITUS WITH STAGE 3 CHRONIC KIDNEY DISEASE, WITH LONG-TERM CURRENT USE OF INSULIN (H): Primary | Chronic | ICD-10-CM

## 2018-12-29 DIAGNOSIS — N18.30 TYPE 2 DIABETES MELLITUS WITH STAGE 3 CHRONIC KIDNEY DISEASE, WITH LONG-TERM CURRENT USE OF INSULIN (H): Primary | Chronic | ICD-10-CM

## 2018-12-29 NOTE — TELEPHONE ENCOUNTER
"Routing refill request to provider for review/approval because:  Medication is reported/historical  -BP elevated    Dr. William/Covering providers-Please sign if agree and advise if patient should return for MA only BP check?    Thank you!  SONIA RobertsN, RN          Requested Prescriptions   Pending Prescriptions Disp Refills     repaglinide (PRANDIN) 1 MG tablet [Pharmacy Med Name: REPAGLINIDE 1MG TABLETS] 90 tablet 0     Sig: TAKE 1 TABLET(1 MG) BY MOUTH THREE TIMES DAILY BEFORE MEALS    Meglitinide Agents Failed - 12/29/2018  3:45 AM       Failed - Blood pressure less than 140/90 in past 6 months    BP Readings from Last 3 Encounters:   12/24/18 156/64   12/20/18 136/70   12/12/18 144/64                Passed - Patient has documented LDL within the past 12 mos.    Recent Labs   Lab Test 03/19/18  1404   *            Passed - Patient has a recent ALT within the past 12 mos.    Recent Labs   Lab Test 12/24/18  1610   ALT 49            Passed - Patient has had a Microalbumin in the past 12 mos.    Recent Labs   Lab Test 03/19/18  1420   MICROL 13   UMALCR 26.39*            Passed - HgbA1C in past 3 or 6 months    If HgbA1C is 8 or greater, it needs to be on file within the past 3 months.  If less than 8, must be on file within the past 6 months.     Recent Labs   Lab Test 11/29/18 09/10/18  0614   A1C  --  5.2   HEMOGLOBINA1 5.5  --             Passed - Patient does not have a diagnosis of CHF       Passed - Patient is age 18 or older       Passed - Patient is not pregnant       Passed - Patient has not had a positive pregnancy test within the past 12 mos.       Passed - Recent (6 mo) or future (30 days) visit within the authorizing provider's specialty    Patient had office visit in the last 6 months or has a visit in the next 30 days with authorizing provider or within the authorizing provider's specialty.  See \"Patient Info\" tab in inbasket, or \"Choose Columns\" in Meds & Orders section of the refill " encounter.           Passed - Patient has a recent AST within the past 12 mos.     Recent Labs   Lab Test 12/24/18  1610   AST 32

## 2018-12-31 ENCOUNTER — HOSPITAL ENCOUNTER (OUTPATIENT)
Dept: CARDIAC REHAB | Facility: CLINIC | Age: 73
End: 2018-12-31
Attending: NURSE PRACTITIONER
Payer: MEDICARE

## 2018-12-31 ENCOUNTER — TELEPHONE (OUTPATIENT)
Dept: FAMILY MEDICINE | Facility: CLINIC | Age: 73
End: 2018-12-31

## 2018-12-31 PROCEDURE — 93798 PHYS/QHP OP CAR RHAB W/ECG: CPT | Mod: KX | Performed by: OCCUPATIONAL THERAPIST

## 2018-12-31 PROCEDURE — 40000116 ZZH STATISTIC OP CR VISIT: Mod: KX | Performed by: OCCUPATIONAL THERAPIST

## 2018-12-31 RX ORDER — REPAGLINIDE 1 MG/1
TABLET ORAL
Qty: 90 TABLET | Refills: 0 | Status: SHIPPED | OUTPATIENT
Start: 2018-12-31 | End: 2019-01-02

## 2018-12-31 RX ORDER — PREGABALIN 100 MG
CAPSULE ORAL
Qty: 180 CAPSULE | Refills: 0 | Status: SHIPPED | OUTPATIENT
Start: 2018-12-31 | End: 2019-04-01

## 2018-12-31 NOTE — TELEPHONE ENCOUNTER
Phone call from Odalis BRODY on file     Odalis is calling to check on Lyrica rx - that was called in on 12/24/18, patient has been out of this medication and was told on 12/28/18 that a covering provider would be filling this for her as pcp was out of the office     Reviewed chart - appears was sent to Dr. Ramos - who had questions regarding medication - RN talked with patient and answered the questions and was sent back to provider for approval - however this did not get approved that day as patient was advised it would be     Writer apologized that med was not filled on 12/28/18 as she was told     Writer advised that rx was signed this am by Rafaela William and faxed     Odalis will check with [facility] and if they do not have this today will call back to clinic for assistance     Elizabeth Luu Registered Nurse   [facility]

## 2019-01-02 ENCOUNTER — OFFICE VISIT (OUTPATIENT)
Dept: FAMILY MEDICINE | Facility: CLINIC | Age: 74
End: 2019-01-02
Payer: MEDICARE

## 2019-01-02 ENCOUNTER — PATIENT OUTREACH (OUTPATIENT)
Dept: CARE COORDINATION | Facility: CLINIC | Age: 74
End: 2019-01-02

## 2019-01-02 VITALS
DIASTOLIC BLOOD PRESSURE: 70 MMHG | OXYGEN SATURATION: 95 % | HEART RATE: 56 BPM | SYSTOLIC BLOOD PRESSURE: 142 MMHG | RESPIRATION RATE: 20 BRPM | TEMPERATURE: 96.6 F

## 2019-01-02 DIAGNOSIS — Z79.4 TYPE 2 DIABETES MELLITUS WITH STAGE 3 CHRONIC KIDNEY DISEASE, WITH LONG-TERM CURRENT USE OF INSULIN (H): Chronic | ICD-10-CM

## 2019-01-02 DIAGNOSIS — E11.22 TYPE 2 DIABETES MELLITUS WITH STAGE 3 CHRONIC KIDNEY DISEASE, WITH LONG-TERM CURRENT USE OF INSULIN (H): Chronic | ICD-10-CM

## 2019-01-02 DIAGNOSIS — J44.1 COPD EXACERBATION (H): Primary | ICD-10-CM

## 2019-01-02 DIAGNOSIS — R06.02 SOB (SHORTNESS OF BREATH): Primary | ICD-10-CM

## 2019-01-02 DIAGNOSIS — N18.30 TYPE 2 DIABETES MELLITUS WITH STAGE 3 CHRONIC KIDNEY DISEASE, WITH LONG-TERM CURRENT USE OF INSULIN (H): Chronic | ICD-10-CM

## 2019-01-02 PROCEDURE — 99214 OFFICE O/P EST MOD 30 MIN: CPT | Performed by: FAMILY MEDICINE

## 2019-01-02 RX ORDER — PREDNISONE 20 MG/1
20 TABLET ORAL DAILY
Qty: 7 TABLET | Refills: 0 | Status: SHIPPED | OUTPATIENT
Start: 2019-01-02 | End: 2019-03-11

## 2019-01-02 ASSESSMENT — ACTIVITIES OF DAILY LIVING (ADL): DEPENDENT_IADLS:: INDEPENDENT

## 2019-01-02 NOTE — PROGRESS NOTES
Clinic Care Coordination Contact    Clinic Care Coordination Contact  OUTREACH    Referral Information:       Primary Diagnosis: Respiratory Disorders - other(weakness low blood sugar )    Chief Complaint   Patient presents with     Clinic Care Coordination - Follow-up     Clinic Care Coordination RN         Universal Utilization: ED visit [facility] 12/24  SOB cough   Clinic Utilization  Difficulty keeping appointments:: No  Compliance Concerns: No  No PCP office visit in Past Year: No  Utilization    Last refreshed: 12/31/2018  9:53 AM:  Hospital Admissions 4           Last refreshed: 12/31/2018  9:53 AM:  ED Visits 2           Last refreshed: 12/31/2018  9:53 AM:  No Show Count (past year) 3              Current as of: 12/31/2018  9:53 AM              Clinical Concerns:  Current Medical Concerns:  Patient saw PCP today and the prednisone was extended for 10 more days   Patient states she can't walk 60 feet from the front to the back of the house without resting  Patient states she quickly recovers with rest.   Patient received her Lyrica which she takes for Fibromyalgia.  Patient had a recent adjustment at the Chiropractor and was told the chest soreness was aggravated by her recent fall and her rib is over her sternum which eventually will readjust or wear down per Chiropractor     Pain  Pain (GOAL):: Yes  Location of chronic pain:: Back /Fibromyalgia  Progression: Unchanged  Health Maintenance Reviewed:    Clinical Pathway: None    Medication Management:  Reviewed today at PCP visit     Functional Status:  Dependent IADLs:: Independent  Bed or wheelchair confined:: No    Living Situation: Lives with a roommate        Diet/Exercise/Sleep:  Food Insecurity: No  Tube Feeding: No  Exercise:: Unable to exercise  Inadequate activity/exercise (GOAL):: No  Significant changes in sleep pattern (GOAL): No    Transportation:  Transportation concerns (GOAL):: No  Transportation means:: Family      Psychosocial:        Financial/Insurance:   Financial/Insurance concerns (GOAL):: No     Supplies used at home:: Nebulizer tubing        Goals:   Goals        General    Medical (pt-stated)     Notes - Note edited  1/2/2019  3:17 PM by Senia Durán RN    Goal Statement: I will monitor shortness of breath episodes   Measure of Success: More energy for daily activity   Supportive Steps to Achieve:   I will finish course of antibiotic and Prednisone   I will seek medical help if experiencing increased shortness of breath episodes   Barriers: None identified   Strengths: supportive room mate   Date to Achieve By: ongoing    Patient expressed understanding of goal: Yes       Pain Management (pt-stated)     Notes - Note edited  1/2/2019  3:18 PM by Senia Durán RN    Goal Statement: I will decrease back pain   Measure of Success: Increased mobility with daily activity   Supportive Steps to Achieve:   I will start taking Tylenol 100 mg 3 times a day per Dr William  I will take Lyrica as directed    I will use heat ice and rest   I will use my cane for safety  Barriers: Deconditioned   Strengths: Continues home care services   Date to Achieve By: to be determined   Patient expressed understanding of goal: yes                  Outreach Frequency: weekly  Future Appointments              In 2 days 1, Ur Cardiac Rehab [facility], [facility] Rehabilitation, [place]    In 5 days 1, Ur Cardiac Rehab [facility], [facility], [place]    In 1 week 1, Ur Cardiac Rehab [facility], [facility], [place]    In 1 week 1, Ur Cardiac Rehab [facility], [facility], [place]    In 1 week 1, Ur Cardiac Rehab [facility], [facility], [place]    In 1 week Joao Diaz OD [facility] Ophthalmology, [facility]    In 2 weeks 1, Ur Cardiac Rehab [facility], [facility], [place]    In 2 weeks 1, Ur Cardiac Rehab [facility], [facility],  [place]    In 2 weeks 1, Ur Cardiac Rehab [facility], Card[facility] Rehabilitation, [place]    In 3 weeks 1, Ur Cardiac Rehab [facility], [facility] Rehabilitation, [place]    In 3 weeks Joao Mejia MD [facility] Ophthalmology, [facility]    In 4 weeks Noah Girard MD [place] [facility]    In 2 months  LAB [facility] Lab, [facility]    In 2 months Stephanie Wolf MD [facility] Heart Care, [facility]    In 3 months Keven Jaeger MD [facility] Endocrinology, [facility]          Plan: CC will follow up in 1-2 weeks    Senia Durán RN / Clinical Care Coordinator     [facility]   [email or web address]  Office :  [phone] / Fax :  [phone]

## 2019-01-02 NOTE — PROGRESS NOTES
"  SUBJECTIVE:   Mariel Ribeiro is a 73 year old female who presents to clinic today for the following health issues:    ED/UC Followup:    Facility[facility]    Date of visit: 12/24/2018  Reason for visit: shortness of breath and cough   Current Status: still short of breath when walking, and still having the cough      The patient had sudden onset of dyspnea with exertion and cough on 12/24/18 and went to the ER.  Troponin x2 were negative, EKG was unchanged; bedside US and CXR did not suggest CHF, and she was advised to stay under observation, but she felt she could \"observe myself at home\" and left AMA.  She states she was prescribed five days of prednisone and azithromycin.  She states that her dyspnea and cough improved a lot with prednisone.  Since she ran out, she has been having more trouble again with her breathing.  No fevers, chills, rhinorrhea, ear pain or sore throat.  She denies chest pain.  She is currently in cardiac rehab, but has not been able to go this past week due to being ill.  She is using her albuterol inhaler every four hours.  It does help.    Other than the sudden onset, this feels like past COPD exacerbations.      DM2--requests a new glucometer, states hers is not calibrating right, is inaccurate and doesn't match the glucometer at cardiac rehab.  Struggling with hypoglycemia and working with her endocrinologist on this.    Problem list and histories reviewed & adjusted, as indicated.  Additional history: as documented    Patient Active Problem List   Diagnosis     Hypothyroidism      HX PHYSICAL ABUSE     Coronary atherosclerosis     Myalgia and myositis     Migraine     Chronic airway obstruction/ COPD     Mononeuritis     FAMILY HX-THROMBOSIS     Obstructive sleep apnea     Vitamin D deficiency     Hyperuricemia     Hypertension goal BP (blood pressure) < 130/80     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)     Hyperlipidemia with target LDL less than 70     Chronic diastolic heart " failure (H)     Intermediate coronary syndrome (H)     Osteoarthritis     Morbid obesity (H)     Arthritis of knee     Constipation     Hypokalemia     Transient cerebral ischemia     Pain in joint of left shoulder     History of thyroid cancer     Cervicalgia     Cholelithiasis     Pancreatitis, acute     Fatty liver disease, nonalcoholic     Hepatomegaly     Angina at rest (H)     S/P CABG x 3     Type 2 diabetes mellitus with stage 3 chronic kidney disease, with long-term current use of insulin (H)     Lymphedema     Syncope     Lower back pain     Falls     Thrombocytopenia (H)     Bilateral carotid artery disease (H)     Spinal stenosis of lumbar region, unspecified whether neurogenic claudication present     Cervical stenosis of spinal canal     Past Surgical History:   Procedure Laterality Date     ANGIOGRAPHY  8/11    with stent placement X2 mid- and proximal LAD     ARTHROPLASTY KNEE  1/28/2014    Procedure: ARTHROPLASTY KNEE;  ARTHROPLASTY KNEE -RIGHT TOTAL  ;  Surgeon: Victor Manuel Wolff MD;  Location: [facility]     BYPASS GRAFT ARTERY CORONARY N/A 7/2/2018    Procedure: BYPASS GRAFT ARTERY CORONARY;  Median Sternotomy, On Cardiopulmonary Bypass Pump, Coronary Artery Bypass Graft x 3, using Left Internal Mammary and Endoscopic Vein [place] on Bilateral Saphenous Vein, Transesophageal Echocardiogram, Epi-aortic Ultrasound;  Surgeon: Joao Mason MD;  Location: [facility]     C TOTAL KNEE ARTHROPLASTY  7/30/04    Knee Replacement, Total right and left     CATARACT IOL, RT/LT Bilateral      COLONOSCOPY       DILATION AND CURETTAGE, OPERATIVE HYSTEROSCOPY WITH MORCELLATOR, COMBINED N/A 11/1/2016    Procedure: COMBINED DILATION AND CURETTAGE, OPERATIVE HYSTEROSCOPY WITH MORCELLATOR;  Surgeon: Stacey Arnold DO;  [facility]: [facility] INTRODUCE NEEDLE/CATH, EXTREMITY ARTERY  1999,2002,2004    Angiocardiogram     HC KNEE SCOPE, DIAGNOSTIC  1990's    Arthroscopy, Knee, bilateral     LAPAROSCOPIC  CHOLECYSTECTOMY N/A 2017    Procedure: LAPAROSCOPIC CHOLECYSTECTOMY;  Laparoscopic Cholecystectomy   *Latex Allergy*, Anesthesia Block;  Surgeon: Jeffrey Roberson MD;  Location: [facility]     PHACOEMULSIFICATION CLEAR CORNEA WITH STANDARD INTRAOCULAR LENS IMPLANT Right 2014    Procedure: PHACOEMULSIFICATION CLEAR CORNEA WITH STANDARD INTRAOCULAR LENS IMPLANT;  Surgeon: Smith Quintana MD;  Location: [facility]     PHACOEMULSIFICATION CLEAR CORNEA WITH TORIC INTRAOCULAR LENS IMPLANT Left 2015    Procedure: PHACOEMULSIFICATION CLEAR CORNEA WITH TORIC INTRAOCULAR LENS IMPLANT;  Surgeon: Smith Quintana MD;  Location: [facility]     SURGICAL HISTORY OF -       dentures     SURGICAL HISTORY OF -       thyroidectomy     SURGICAL HISTORY OF -       right thumb surgery     SURGICAL HISTORY OF -       right breast biopsy (benign)     SURGICAL HISTORY OF -   2004    left shoulder surgery - rotator cuff     SURGICAL HISTORY OF -       left thumb surgery     THYROIDECTOMY         Social History     Tobacco Use     Smoking status: Former Smoker     Packs/day: 0.00     Years: 27.00     Pack years: 0.00     Types: Cigarettes     Last attempt to quit: 1989     Years since quittin.5     Smokeless tobacco: Never Used   Substance Use Topics     Alcohol use: No     Alcohol/week: 0.0 oz     Family History   Problem Relation Age of Onset     C.A.D. Mother      Diabetes Mother      Hypertension Mother      Blood Disease Mother         multiple episodes of thrombosis     Circulatory Mother         DVT X 2; ocular clot; cerebral; carotid artery stenosis     Glaucoma Mother      Macular Degeneration Mother      C.A.D. Father      Hypertension Father      Cerebrovascular Disease Father      Alcohol/Drug Father         etoh     Cancer Brother         liver,pancreas, brain     Cardiovascular Sister      Hypertension Sister      Hypertension Brother      Alcohol/Drug Sister         etoh      Alcohol/Drug Brother         drug     Diabetes Sister         younger     C.A.D. Sister         CABG age 65     C.A.D. Brother         CABG age 42     C.A.D. Sister         stents age 58     C.A.D. Brother      Genitourinary Problems Sister         kidney disease         Current Outpatient Medications   Medication Sig Dispense Refill     acetaminophen (TYLENOL) 325 MG tablet Take 2 tablets (650 mg) by mouth every 4 hours as needed for mild pain (Patient taking differently: Take 650 mg by mouth every 6 hours as needed for mild pain ) 100 tablet      albuterol (PROAIR HFA) 108 (90 Base) MCG/ACT inhaler Inhale 1-2 puffs into the lungs every 4 hours as needed for wheezing 1 Inhaler PRN     aspirin  MG tablet Take 1 tablet by mouth daily. 30 tablet 0     atorvastatin (LIPITOR) 40 MG tablet Take 1 tablet (40 mg) by mouth daily 30 tablet 0     blood glucose monitoring (NO BRAND SPECIFIED) meter device kit Use to test blood sugar four times daily or as directed. 1 kit 0     blood glucose monitoring (NO BRAND SPECIFIED) test strip 1 strip by In Vitro route 2 times daily Strips per patient's preference 200 each 3     bumetanide (BUMEX) 2 MG tablet Take 1 tablet (2 mg) by mouth every 48 hours May take 1mg extra as directed by [facility]. 45 tablet 3     EPINEPHrine (EPIPEN/ADRENACLICK/OR ANY BX GENERIC EQUIV) 0.3 MG/0.3ML injection 2-pack Inject 0.3 mLs (0.3 mg) into the muscle once as needed for anaphylaxis 0.6 mL 0     escitalopram (LEXAPRO) 20 MG tablet Take 1 tablet (20 mg) by mouth every morning 30 tablet 0     ferrous gluconate (FERGON) 324 (38 Fe) MG tablet Take 1 tablet (324 mg) by mouth Every Mon, Wed, Fri Morning 36 tablet 3     FOLIC ACID PO Take 1 mg by mouth daily        guaiFENesin (MUCINEX) 600 MG 12 hr tablet Take 1 tablet (600 mg) by mouth 2 times daily       insulin isophane human (HUMULIN N KWIKPEN) 100 UNIT/ML injection Inject 20 units subcutaneous each am and each pm. She is to hold her NPH the am  on rehab days. 15 mL 3     levothyroxine (SYNTHROID/LEVOTHROID) 150 MCG tablet Take 1 tablet (150 mcg) by mouth daily 30 tablet 0     Sporty[employer] FINEPOINT LANCETS MISC Use to test blood sugars 2 times daily or as directed. (Patient taking differently: Use to test blood sugars 5 times daily or as directed.) 100 each prn     lifitegrast (XIIDRA) 5 % opthalmic solution Place 1 drop into both eyes 2 times daily 1 each 11     losartan (COZAAR) 25 MG tablet Take 2 tablets (50 mg) by mouth daily 60 tablet 3     LYRICA 100 MG capsule TAKE 1 CAPSULE BY MOUTH TWICE DAILY 180 capsule 0     Metoprolol Tartrate 75 MG TABS Take 75 mg by mouth 2 times daily 180 tablet 3     niacin (NIASPAN) 500 MG CR tablet Take 500 mg by mouth daily   9     nitroGLYcerin (NITROSTAT) 0.4 MG sublingual tablet For chest pain place 1 tablet under the tongue every 5 minutes for 3 doses. If symptoms persist 5 minutes after 1st dose call 911. 25 tablet 0     ONE TOUCH ULTRA (DEVICES) MISC test blood sugar BID (Patient taking differently: test blood sugar 5 times per day) 1 0     potassium chloride ER (K-DUR/KLOR-CON M) 10 MEQ CR tablet Take 10 mEq Tuesday, Thurs, Sat, Sun. Take 20 MeQ Monday, Wednesday, Friday. 360 tablet 3     predniSONE (DELTASONE) 20 MG tablet Take 20 mg by mouth daily for 7 days. 7 tablet 0     repaglinide (PRANDIN) 1 MG tablet Take 1 mg with meals.  Hold Prandin in the am and at noon on days you have rehab. 90 tablet 3     senna-docusate (SENOKOT-S;PERICOLACE) 8.6-50 MG per tablet Take 1-2 tablets by mouth 2 times daily as needed for constipation (Patient taking differently: Take 1-2 tablets by mouth daily as needed ) 30 tablet 0     traZODone (DESYREL) 50 MG tablet Take 3 tablets (150 mg) by mouth At Bedtime 270 tablet 1     VITAMIN D, CHOLECALCIFEROL, PO Take 10,000 Units by mouth daily       Allergies   Allergen Reactions     Contrast Dye Anaphylaxis     Fish Allergy Anaphylaxis     Iodine Anaphylaxis     Oxycodone Other (See  Comments)     Severe suicidal tendencies on this medication     Tree Nuts [Nuts] Anaphylaxis     Tree nuts only (peanuts ok)     Albuterol Other (See Comments)     Sandrita-oral erythema  Confirmed 7/3/18 that patient uses albuterol inhalers at home. Patient had her home inhaler in her bag.     Bactrim      Increased uric acid     Betadine [Povidone Iodine] Hives, Swelling and Difficulty breathing     Betadine     Combivent      Rash     Dulaglutide Other (See Comments)     Hx . Of thyroid cancer.     Lisinopril Other (See Comments)     Scr/grf severely reduced.      Penicillins Rash     Allopurinol Rash     Latex Rash     Wool Fiber Rash       Reviewed and updated as needed this visit by clinical staff  Tobacco  Allergies  Meds  Med Hx  Surg Hx  Fam Hx  Soc Hx      Reviewed and updated as needed this visit by Provider         ROS:  Constitutional, HEENT, cardiovascular, pulmonary, gi and gu systems are negative, except as otherwise noted.    OBJECTIVE:     /70   Pulse 56   Temp 96.6  F (35.9  C) (Tympanic)   Resp 20   SpO2 95%   There is no height or weight on file to calculate BMI.  GENERAL APPEARANCE: morbidly obese, elderly woman, alert and no distress  EYES: Eyes grossly normal to inspection, PERRL and conjunctivae and sclerae normal  RESP: lungs clear to auscultation - no rales, rhonchi or wheezes, decreased breath sounds in the bases bilaterally.  Mild dyspnea with speaking, but speaking in full sentences.    CV: regular rates and rhythm, normal S1 S2, no S3 or S4 and no murmur, click or rub  PSYCH: mentation appears normal and affect normal/bright    Diagnostic Test Results:  none     ASSESSMENT/PLAN:     72 yo F with a history of CAD s/p CABG, nonischemic cardiomyopathy, DM2, COPD, arthritis, morbid obesity, and hypothyroidism, among other chronic diagnoses, presents in follow up of her recent ER visit for COPD exacerbation.  Overall, her symptoms improved with treatment, but since stopping the  steroid, her dyspnea and cough have continued to bother her.        1. COPD exacerbation (H)  In the past, Mariel has required a bit longer with oral steroids to recover from exacerbations; I will prescribe one additional week of prednisone; f/u if not improved. Continue albuterol as needed.  No indication for further antibiotic.  Reviewed clear CXR from ER visit.  No fever or exam findings to suggest a pneumonia.    - predniSONE (DELTASONE) 20 MG tablet; Take 20 mg by mouth daily for 7 days.  Dispense: 7 tablet; Refill: 0    2. Type 2 diabetes mellitus with stage 3 chronic kidney disease, with long-term current use of insulin (H)    - blood glucose monitoring (NO BRAND SPECIFIED) meter device kit; Use to test blood sugar four times daily or as directed.  Dispense: 1 kit; Refill: 0        Rafaela William MD  [facility]

## 2019-01-02 NOTE — LETTER
[facility]  Complex Care Plan  About Me  Patient Name:  Mariel Ribeiro    YOB: 1945  Age:     73 year old   Center MRN:   [id number] Telephone Information:  Home Phone [phone]   Mobile Not on file.       Address:    [address] [place] [address] Email address:  [email or web address]      Emergency Contact(s)  Name Relationship Lgl Grd Work Phone Home Phone Mobile Phone   1. KOSTAS PIERSON Roommate No [phone]   2. VITA ODOM Sister No  [phone]   3. ORTIZ, SAKSHI Friend No  [phone]           Primary language:  English     needed? No   Center Language Services:  [phone] op. 1  Other communication barriers: None  Preferred Method of Communication:  Fabian  Current living arrangement:    Mobility Status/ Medical Equipment:      Health Maintenance  Health Maintenance Reviewed:      My Access Plan  Medical Emergency 911   Primary Clinic Line [facility] [phone]   24 Hour Appointment Line [phone] or  [phone]) (toll-free)   24 Hour Nurse Line [phone] (toll-free)   Preferred Urgent Care     Preferred Hospital     Preferred Pharmacy [id number] - [place], [place] - [address] AT [facility] [address]     Behavioral Health Crisis Line The National Suicide Prevention Lifeline at 1-401.940.6098 or 911     My Care Team Members    Patient Care Team       Relationship Specialty Notifications Start End    Rafaela William MD PCP - General Family Practice  11/17/15     Phone: [phone] Fax: [phone]         [address] [place] [address]    Rafaela William MD PCP - Assigned PCP   10/25/15     Phone: [phone] Fax: [phone]         [address] [place] [address]    Karen Hilton, [facility] Pharmacist Pharmacist  9/8/14      [address] [facility] [address]    Caryl  Zeeshan Pritchett MD MD Orthopedics  9/8/14     Phone: [phone] Fax: [phone]         [address] [place] [address]    Jefry Hanson DPM  Podiatry  9/8/14     Phone: [phone] Fax: [phone]         [address] [facility] [address]    Tom Cruz MD MD Neurology  5/12/15     Phone: [phone] Fax: [phone]         [address] [facility] [facility] [address]    Rosina Kohler MD MD Family Medicine - Sports Medicine  11/21/16     Phone: [phone] Fax: [phone]         [address] [facility] [address]    Jeffrey Roberson MD MD General Surgery  5/22/17     Phone: [phone] Fax: [phone]         [address] [facility] [address] [facility] [address]    Peter Khan RN Nurse Coordinator Cardiology  7/2/18     Stephanie oWlf MD MD Cardiology  7/2/18     Phone: [phone] Fax: [phone]         [address] [facility] [address] [facility] [address]    Senia Durán, RN Lead Care Coordinator Primary Care -  Admissions 7/17/18     Phone: [phone] Fax: [phone]        Cathi Vaughn APRN CNP Nurse Practitioner Cardiology Admissions 8/24/18     [facility]    Phone: [phone] Pager: [phone] Fax: [phone]        [facility] [place] [address]    Rosina Mays, RN Nurse Coordinator Cardiology Admissions 8/24/18     [facility]    Phone: [phone] Fax: [phone]                My Care Plans  Self Management and Treatment Plan  Goals and (Comments)  Goals        General    Medical (pt-stated)     Notes - Note edited  1/2/2019  3:17 PM by Senia Durán, RN    Goal Statement: I will monitor shortness of breath episodes   Measure of Success: More energy for daily activity   Supportive Steps to Achieve:   I will finish course of antibiotic and Prednisone   I will seek medical help if experiencing increased shortness of breath episodes   Barriers: None identified   Strengths: supportive room  mate   Date to Achieve By: ongoing    Patient expressed understanding of goal: Yes       Pain Management (pt-stated)     Notes - Note edited  1/2/2019  3:18 PM by Senia Durán RN    Goal Statement: I will decrease back pain   Measure of Success: Increased mobility with daily activity   Supportive Steps to Achieve:   I will start taking Tylenol 100 mg 3 times a day per Dr William  I will take Lyrica as directed    I will use heat ice and rest   I will use my cane for safety  Barriers: Deconditioned   Strengths: Continues home care services   Date to Achieve By: to be determined   Patient expressed understanding of goal: yes               Action Plans on File: CHF           Depression          Advance Care Plans/Directives Type: Yes       My Medical and Care Information  Problem List   Patient Active Problem List   Diagnosis     Hypothyroidism      HX PHYSICAL ABUSE     Coronary atherosclerosis     Myalgia and myositis     Migraine     Chronic airway obstruction/ COPD     Mononeuritis     FAMILY HX-THROMBOSIS     Obstructive sleep apnea     Vitamin D deficiency     Hyperuricemia     Hypertension goal BP (blood pressure) < 130/80     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)     Hyperlipidemia with target LDL less than 70     Chronic diastolic heart failure (H)     Intermediate coronary syndrome (H)     Osteoarthritis     Morbid obesity (H)     Arthritis of knee     Constipation     Hypokalemia     Transient cerebral ischemia     Pain in joint of left shoulder     History of thyroid cancer     Cervicalgia     Cholelithiasis     Pancreatitis, acute     Fatty liver disease, nonalcoholic     Hepatomegaly     Angina at rest (H)     S/P CABG x 3     Type 2 diabetes mellitus with stage 3 chronic kidney disease, with long-term current use of insulin (H)     Lymphedema     Syncope     Lower back pain     Falls     Thrombocytopenia (H)     Bilateral carotid artery disease (H)     Spinal stenosis of lumbar region,  unspecified whether neurogenic claudication present     Cervical stenosis of spinal canal      Current Medications and Allergies:  See printed Medication Report.    Care Coordination Start Date: 11/29/2018   Frequency of Care Coordination: weekly   Form Last Updated: 01/02/2019

## 2019-01-03 ENCOUNTER — DOCUMENTATION ONLY (OUTPATIENT)
Dept: SLEEP MEDICINE | Facility: CLINIC | Age: 74
End: 2019-01-03

## 2019-01-03 NOTE — PROGRESS NOTES
STM Recheck Visit     Subjective measures:   Pt states that things are going better.  When she wears it she is able to wear it more than 4 hours but she doesn't wear it every night.  She is going to wear it every night now so that she doesn't have an issue with compliance.     Assessment: Pt not meeting objective benchmarks for compliance  Patient meeting subjective benchmarks.   Action plan: 2 week STM recheck appt scheduled  Patient has a follow up visit with Dr. Girard on 1/30/19.   Device type:  Bilevel    PAP settings: 16/8cm  H20      Objective measures: 14 day rolling measures      Compliance  28 %      Leak  12.2 lpm  last  upload      AHI 4.73   last  upload      Average number of minutes 106      Diagnostic AHI:   PSG AHI: 33.3    Objective measure goal  Compliance   Goal >70%  Leak   Goal < 10%  AHI  Goal < 5  Usage  Goal >240

## 2019-01-07 ENCOUNTER — HOSPITAL ENCOUNTER (OUTPATIENT)
Dept: CARDIAC REHAB | Facility: CLINIC | Age: 74
End: 2019-01-07
Attending: NURSE PRACTITIONER
Payer: MEDICARE

## 2019-01-07 PROCEDURE — 40000575 ZZH STATISTIC OP CARDIAC VISIT #2: Mod: KX

## 2019-01-07 PROCEDURE — 93797 PHYS/QHP OP CAR RHAB WO ECG: CPT | Mod: KX,XU

## 2019-01-07 PROCEDURE — 40000116 ZZH STATISTIC OP CR VISIT: Mod: KX | Performed by: OCCUPATIONAL THERAPIST

## 2019-01-07 PROCEDURE — 93798 PHYS/QHP OP CAR RHAB W/ECG: CPT | Mod: KX | Performed by: OCCUPATIONAL THERAPIST

## 2019-01-08 ASSESSMENT — 6 MINUTE WALK TEST (6MWT)
TOTAL DISTANCE WALKED (FT): 560
GENDER SELECTION: FEMALE

## 2019-01-08 NOTE — PROGRESS NOTES
Physician cosignature/electronic signature indicates approval of this ITP document. I have established, reviewed and made necessary changes to the individualized treatment plan and exercise prescription for this patient.     01/08/19 1100   Session   Session 60 Day Individualized Treatment Plan   Certified through this date 02/06/19   Cardiac Rehab Assessment   Cardiac Rehab Assessment Pt is a 73 year old female with severe triple vessel CAD, DM on insulin pump, COPD and previous PCI of LAD, hypertension, morbid obesity and sleep apnea, Mariel Ribeiro is a 73 year old female with a past medical history of DM2, HTN, CKD3, HLD, COPD, fibromyalgia, anxiety, depression, lymphedema, and multivessel CAD who presented with with unstable angina. Patient was originally scheduled to see Dr. Mason in clinic, however given her recurrent chest pain, CABG was moved to 7/2/2018. Patient was sent in via EMS from her home after a home health care nurse check her blood pressure today and she was noted to be hypotensive.  Patient here denies any focal weakness or numbness, denies any headache, cough or chest pain.  Patient reports that her blood pressure will intermittently drop; however, she typically just drinks some fluids and her blood pressure will improve.  Per review of patient's chart, patient has been seeing neurology recently and is being worked up for presyncopal episodes associated with weakness. 12/11 Pt not present for update, she does report that she is feeling stronger. She does have multiple medical complications, and attendance is irregular. She requested to decr. to 2x wk due to freq chiropracter apts. Discussed benefit of coming consistly to see progress, and offered to put on hold. She plans to continue 3x wk. Skilled services necessary to monitor cv response to ex. And advance toward goals, provide counseling to make lifestyle changes.   General Information   Treatment Diagnosis Coronary Artery Bypass  "Surgery  (X3)   Date of Treatment Diagnosis 07/02/18   Significant Past CV History Previous MI;Previous PCI;Clinical significant depression or depressive symptoms   Comorbidities DM   Other Medical History .pmh   Lead up symptoms Unstable Angina chest pain   Hospital Location [facility]   Hospital Discharge Date 07/23/18   Signs and Symptoms Post Hospital Discharge Fatigue;Lightheadedness;Other (see comments)  (Pt has been hospitalized 3x since her DC 7/23)   Outpatient Cardiac Rehab Start Date 11/26/18   Primary Physician Rafaela William   Primary Physician Follow Up Completed   Cardiologist MD Wolf Cindy   Cardiologist Follow Up Completed   Ejection Fraction 40-45%  (40-45% 8/15/18., 55-60% 7/13/2018)   Risk Stratification Moderate   Summary of Cath Report   Summary of Cath Report Available   Date Performed 06/29/18   Left Main Normal   LAD LIMA   D1 <25   D2 <25   LCX pLCX 50-75%   OM SVG-OMA2   RCA pRCA 75-90%   PDA SVG-rPDA   Living and Work Status    Living Arrangements and Social Status house   Support System Live with an adult   Return to Employment Retired   Occupation    Preventative Medications   CMS recommended medications Antiplatelets;Beta Blocker;Lipid Lowering;Influenza vaccination;Pneumonia vaccination   Fall Risk Screen   Fall screen completed by Cardiac Rehab   Have you fallen 2 or more times in the past year? Yes   Have you fallen and had an injury in the past year? Yes   Is patient a fall risk? Yes   Fall screen comments Pt received in home PT after surgery , 12/11 To give \"Fall prevention at Home\" handout   Pain   Patient Currently in Pain Yes   Pain Location Low back   Pain Rating 7/10   Pain Description Ache;Sharp   Physical Assessments   Incisions Not applicable   Edema +4 Severe  (Pt has lymphedema)   Right Lung Sounds normal   Left Lung Sounds normal   Limitations Arthritis  (neuropathy in feet, bilateral knee replacement and left hip ) "   Individualized Treatment Plan   Monitored Sessions Scheduled 24   Monitored Sessions Attended 7   Oxygen   Supplemental Oxygen needed No   Nutrition Management - Weight Management   Assessment Re-assessment   Age 73   Initial Rate Your Plate Score. Dietary tool to assess eating patterns. Scores range from 24 to 72. The higher the score the healthier the eating pattern. 46   Nutrition Management - Lipids   Lipids Labs Available   Date 03/19/18   Total Cholesterol 203   Triglycerides 212   HDL 5      Prescribed Lipid Medication Yes   Statin Intensity High Intensity   Nutrition Management - Diabetes   Diabetes Type II   Diabetes Medication Prescribed Yes, Insulin   Hb A1C Date: 09/10/18   Hb A1C Result: 5.2   Nutrition Management Summary   Dietary Recommendations Low Fat;Low Cholesterol;Low Sodium   Stages of Change for Diet Compliance Pre-Contemplation   Interventions Planned Attend Nutrition Education Class(es)   Psychosocial Management   Psychosocial Assessment Re-assessment   Is there history of clinical depression or increased risk of depression? History of clinical depression   Current Level of Stress per Patient Report Moderate    Current Coping Skills Other (see comments)  (Sleep, [place])   Initial Patient Health Questionnaire -9 Score (PHQ-9) for depression. 5-9 Minimal symptoms, 10-14 Minor depression, 15-19 Major depression, moderately severe, > 20 Major depression, severe  11   Initial [facility] Survey score.  Quality of Life:   If total score > 25 review individual areas where patient rated a 4 or 5.  Consider patients current medical condition and what role that plays on the score.   Adjust treatment protocol to improve areas of concern.  Consider the following:  PHQ9 score, DASI, and re-assessment within the next 30 days to assist with developing treatments.  37   Stages of Change Preparation   Interventions Planned Patient to verbalize understanding of behavioral assessment results;Reassess  PHQ-9 and/or [facility] COOP Surveys if outside of defined limits   Psychosocial Comments Pt reports that her stress is better since she has been able to resume driving. Pt is on 20mg of Lexapro   Other Core Components - Hypertension   History of or Diagnosis of Hypertension Yes   Currently taking Anti-Hypertensives Yes;Beta blocker   Other Core Components - Tobacco   History of Tobacco Use Yes   Quit Date or Planned Quit Date 01/01/89   Tobacco Use Status Former (Quit > 6 mo ago)   Tobacco Habit Cigarettes   Tobacco Use per Day (average) 1.5   Years of Tobacco Use 30   Stages of Change NA   Other Core Components Summary   Interventions Planned Instruct patient on the DASH diet;Provide information on home blood pressure monitoring;Attend education class on Blood Pressure;List benefits of weight management;Educate on importance of maintaining low sodium diet   Activity/Exercise History   Activity/Exercise Assessment Re-assessment   Activity/Exercise Status prior to event? Sedentary   Number of Days Currently participating in Moderate Physical Activity? 2   Number of Days Currently performing  Aerobic Exercise (including rehab)? 7   Number of Minutes per Session Currently of Aerobic Exercise (average)? 15  (pt uses a health rider and Ab Do'er)   Current Stage of Change (Physical Activity) Preparation   Current Stage of Change (Aerobic Exercise) Preparation   Patient Goals Goal #1;Goal #2;Goal #3   Goal #1 Description Pt wants to resume walking her dog, demonstrated by mary jo gregg to walking 15 min, without symptoms and with stable cv response.    Goal #1 Target Date 01/24/19   Goal #1 Progress Towards Goal Pt will focus on improvin gher leg strength though the use of leg press, TDM and NS  12/11 Pt has participated in seated ex for 15 min at 2METs, she is able to walk short distance with her cane. 1/8 Goal modified as walking 1 mile appear unrealistic. Pt is tolerating cane walking for 2 min bouts only.    Goal #2  Description Pt will focus on improving her upperbody strength to return safely to her ADL's, yard work, shoveling, raking leaves   Goal #2 Target Date 01/10/19   Goal #2 Progress Towards Goal 1/8 Pt is participating in free wt exercises. at low level. Will add L/E leg ex with cuff wts.    Goal #3 Description Goal added 1/8 Pt to establish indep exercise and transitioning into 2-3times per week prior to d/c from program    Goal #3 Progress Towards Goal 1/8 pt has not started indep ex. Will continue to advance    Exercise Assessment   6 Minute Walk Predicted - Gender Selection Female   Initial 6 Minute Walk Distance (Feet) 560 ft   Resting HR 63 bpm   Exercise HR 82 bpm   Post Exercise HR 56 bpm   Resting /60  (BP on forearm, vitals from 1/7)   Exercise /62   Post Exercise /60   Pre  mg/dL   Post  mg/dL   Effort Rating 8   Current MET Level 2.0   MET Level Goal 3-3.5   ECG Rhythm Sinus rhythm;Sinus bradycardia   Ectopy None   Current Symptoms Denies symptoms   Limitations/Restrictions None   Exercise Prescription   Mode Airdyne;Nustep;Weights;Ambulation   Duration/Time 15-30 min   Frequency 3 daysweek   THR (85% of age predicted max HR) 125.8   OMNI Effort Rating (0-10 Scale) 4-6/10   Progression Intermittent bouts;Total exercise time of 20-30 minutes;Progress peak intensity by 1/4 MET per week   Recommended Home Exercise   Type of Exercise Walking;Other (comments)   Frequency (days per week) Daily   Duration (minutes per session) 15-30 min   Effort Rating Recommended 4-6/10   Current Home Exercise   Type of Exercise Other (comments)   Frequency (days per week) Health rider, Ab machine   Duration (minutes per session) 15   Follow-up/On-going Support   Provider follow-up needed on the following No follow-up needed   Learning Assessment   Learner Patient   Primary Language English   Preferred Learning Style Listening;Reading;Demonstration;Pictures/Video   Barriers to Learning  Cognitive;Hearing  (Hearing loss, self diagnosed congnitive learing issues)   Patient Education   Education recommended Anatomy and Physiology of the Heart;Blood Pressure;Exercise Principles;Medication Overview;Nutrition;Risk Factors;Stress Management   Education classes attended Medication Overview

## 2019-01-09 ENCOUNTER — HOSPITAL ENCOUNTER (OUTPATIENT)
Dept: CARDIAC REHAB | Facility: CLINIC | Age: 74
End: 2019-01-09
Attending: NURSE PRACTITIONER
Payer: MEDICARE

## 2019-01-09 DIAGNOSIS — I50.32 CHRONIC DIASTOLIC HEART FAILURE (H): Chronic | ICD-10-CM

## 2019-01-09 PROCEDURE — 93798 PHYS/QHP OP CAR RHAB W/ECG: CPT | Mod: KX | Performed by: REHABILITATION PRACTITIONER

## 2019-01-09 PROCEDURE — 40000116 ZZH STATISTIC OP CR VISIT: Performed by: REHABILITATION PRACTITIONER

## 2019-01-11 ENCOUNTER — HOSPITAL ENCOUNTER (OUTPATIENT)
Dept: CARDIAC REHAB | Facility: CLINIC | Age: 74
End: 2019-01-11
Attending: NURSE PRACTITIONER
Payer: MEDICARE

## 2019-01-11 PROCEDURE — 40000116 ZZH STATISTIC OP CR VISIT

## 2019-01-11 PROCEDURE — 93798 PHYS/QHP OP CAR RHAB W/ECG: CPT | Mod: KX

## 2019-01-14 ENCOUNTER — HOSPITAL ENCOUNTER (OUTPATIENT)
Dept: CARDIAC REHAB | Facility: CLINIC | Age: 74
End: 2019-01-14
Attending: NURSE PRACTITIONER
Payer: MEDICARE

## 2019-01-14 PROCEDURE — 40000116 ZZH STATISTIC OP CR VISIT: Mod: KX | Performed by: OCCUPATIONAL THERAPIST

## 2019-01-14 PROCEDURE — 40000575 ZZH STATISTIC OP CARDIAC VISIT #2: Mod: KX

## 2019-01-14 PROCEDURE — 93798 PHYS/QHP OP CAR RHAB W/ECG: CPT | Mod: KX | Performed by: OCCUPATIONAL THERAPIST

## 2019-01-14 PROCEDURE — 93797 PHYS/QHP OP CAR RHAB WO ECG: CPT

## 2019-01-14 RX ORDER — POTASSIUM CHLORIDE 750 MG/1
TABLET, EXTENDED RELEASE ORAL
Qty: 360 TABLET | Refills: 3 | Status: SHIPPED | OUTPATIENT
Start: 2019-01-14 | End: 2019-03-04

## 2019-01-16 ENCOUNTER — CARE COORDINATION (OUTPATIENT)
Dept: CARDIOLOGY | Facility: CLINIC | Age: 74
End: 2019-01-16

## 2019-01-16 NOTE — PROGRESS NOTES
Called Mariel per our conversation on 12/21 to schedule lab appointment to recheck potassium. She will get labs checked next week and will follow up from there. Order already placed.

## 2019-01-18 ENCOUNTER — TELEPHONE (OUTPATIENT)
Dept: ENDOCRINOLOGY | Facility: CLINIC | Age: 74
End: 2019-01-18

## 2019-01-18 ENCOUNTER — HOSPITAL ENCOUNTER (OUTPATIENT)
Dept: CARDIAC REHAB | Facility: CLINIC | Age: 74
End: 2019-01-18
Attending: NURSE PRACTITIONER
Payer: MEDICARE

## 2019-01-18 DIAGNOSIS — E11.22 TYPE 2 DIABETES MELLITUS WITH STAGE 3 CHRONIC KIDNEY DISEASE, WITH LONG-TERM CURRENT USE OF INSULIN (H): Primary | Chronic | ICD-10-CM

## 2019-01-18 DIAGNOSIS — N18.30 TYPE 2 DIABETES MELLITUS WITH STAGE 3 CHRONIC KIDNEY DISEASE, WITH LONG-TERM CURRENT USE OF INSULIN (H): Primary | Chronic | ICD-10-CM

## 2019-01-18 DIAGNOSIS — Z79.4 TYPE 2 DIABETES MELLITUS WITH STAGE 3 CHRONIC KIDNEY DISEASE, WITH LONG-TERM CURRENT USE OF INSULIN (H): Primary | Chronic | ICD-10-CM

## 2019-01-18 PROCEDURE — 93798 PHYS/QHP OP CAR RHAB W/ECG: CPT | Mod: KX

## 2019-01-18 PROCEDURE — 40000116 ZZH STATISTIC OP CR VISIT: Mod: KX

## 2019-01-18 NOTE — TELEPHONE ENCOUNTER
[facility] Call Center    Phone Message    May a detailed message be left on voicemail: yes    Reason for Call: Symptoms or Concerns     If patient has red-flag symptoms, warm transfer to triage line    Current symptom or concern: LOW BLOOD SUGAR <70    Are there any new or worsening symptoms? Yes: Extremely low blood sugar, sometimes every day.    Tried to get PT scheduled to come in, but Odalis Medley is scheduling out too far. PT is requesting a follow up for her Blood Sugar. PT is also doing Cardiac Rehabilitation, but they are unable to work with her if her Blood Sugar is less than 100. Please follow up with PT as soon as possible for either a sooner appt, or to connect over the phone about her care.      Action Taken: Message routed to:  [facility] ([facility]): Isela

## 2019-01-19 NOTE — TELEPHONE ENCOUNTER
Called pt- pt should not take Prandin  Pt to decrease NPH at 8 units in the AM, no AM prandin, take NPH 20 units in the evening     Normal program at [facility] 28 units twice daily + prandin    ---------------------------  [facility] patient  having  Low blood sugars especially on Rehab days. She goes to rehab 3 days a week Monday- Wednesday - Friday   She tells me that Odalis called her and changed orders to:    Humulin N 14 units  No am prandin  Am on rehab days and 28 units PM   Non rehab days  28 units am and PM   Prandin 1 mg is  TID but on  Rehab days she does not take the AM dose  Very different then what it ion med list .  BS:  1/18/18   14 units  No prandid= arrived at rehab 1 PM bs  101 they gave her apple juice to keep number up.=   2pm  265= 6 pm  165  1/17   28 units 1 mg prandin = 2 = 6pm 165=     1/16/18    14 units no prandin= rehab at 1 PM 40= dinner  189=    Can you please call her with changes  I don't think she heard correctly . They will not let her exercise with low numbers at rehab.

## 2019-01-21 ENCOUNTER — HOSPITAL ENCOUNTER (OUTPATIENT)
Dept: CARDIAC REHAB | Facility: CLINIC | Age: 74
End: 2019-01-21
Attending: NURSE PRACTITIONER
Payer: MEDICARE

## 2019-01-21 PROCEDURE — 93798 PHYS/QHP OP CAR RHAB W/ECG: CPT | Mod: KX | Performed by: OCCUPATIONAL THERAPIST

## 2019-01-21 PROCEDURE — 93797 PHYS/QHP OP CAR RHAB WO ECG: CPT | Mod: XU | Performed by: OCCUPATIONAL THERAPIST

## 2019-01-21 PROCEDURE — 40000116 ZZH STATISTIC OP CR VISIT: Mod: KX | Performed by: OCCUPATIONAL THERAPIST

## 2019-01-21 PROCEDURE — 40000575 ZZH STATISTIC OP CARDIAC VISIT #2: Performed by: OCCUPATIONAL THERAPIST

## 2019-01-25 ENCOUNTER — HOSPITAL ENCOUNTER (OUTPATIENT)
Dept: CARDIAC REHAB | Facility: CLINIC | Age: 74
End: 2019-01-25
Attending: NURSE PRACTITIONER
Payer: MEDICARE

## 2019-01-25 PROCEDURE — 93798 PHYS/QHP OP CAR RHAB W/ECG: CPT | Performed by: REHABILITATION PRACTITIONER

## 2019-01-25 PROCEDURE — 40000116 ZZH STATISTIC OP CR VISIT: Performed by: REHABILITATION PRACTITIONER

## 2019-01-27 DIAGNOSIS — E11.22 TYPE 2 DIABETES MELLITUS WITH STAGE 3 CHRONIC KIDNEY DISEASE, WITH LONG-TERM CURRENT USE OF INSULIN (H): Chronic | ICD-10-CM

## 2019-01-27 DIAGNOSIS — Z79.4 TYPE 2 DIABETES MELLITUS WITH STAGE 3 CHRONIC KIDNEY DISEASE, WITH LONG-TERM CURRENT USE OF INSULIN (H): Chronic | ICD-10-CM

## 2019-01-27 DIAGNOSIS — N18.30 TYPE 2 DIABETES MELLITUS WITH STAGE 3 CHRONIC KIDNEY DISEASE, WITH LONG-TERM CURRENT USE OF INSULIN (H): Chronic | ICD-10-CM

## 2019-01-27 NOTE — TELEPHONE ENCOUNTER
"Requested Prescriptions   Pending Prescriptions Disp Refills     repaglinide (PRANDIN) 1 MG tablet [Pharmacy Med Name: REPAGLINIDE 1MG TABLETS]      Last Written Prescription Date:  12/14/2018  Last Fill Quantity: 90 tablet     ,  # refills: 3   Last Office Visit: 1/2/2019   Future Office Visit:      90 tablet 0     Sig: TAKE 1 TABLET(1 MG) BY MOUTH THREE TIMES DAILY BEFORE MEALS    Meglitinide Agents Failed - 1/27/2019  3:45 AM       Failed - Blood pressure less than 140/90 in past 6 months    BP Readings from Last 3 Encounters:   01/02/19 142/70   12/24/18 156/64   12/20/18 136/70          Passed - Patient has documented LDL within the past 12 mos.    Recent Labs   Lab Test 03/19/18  1404   *          Passed - Patient has a recent ALT within the past 12 mos.    Recent Labs   Lab Test 12/24/18  1610   ALT 49          Passed - Patient has had a Microalbumin in the past 12 mos.    Recent Labs   Lab Test 03/19/18  1420   MICROL 13   UMALCR 26.39*          Passed - HgbA1C in past 3 or 6 months    If HgbA1C is 8 or greater, it needs to be on file within the past 3 months.  If less than 8, must be on file within the past 6 months.     Recent Labs   Lab Test 11/29/18 09/10/18  0614   A1C  --  5.2   HEMOGLOBINA1 5.5  --           Passed - Patient does not have a diagnosis of CHF       Passed - Medication is active on med list       Passed - Patient is age 18 or older       Passed - Patient is not pregnant       Passed - Patient has not had a positive pregnancy test within the past 12 mos.       Passed - Recent (6 mo) or future (30 days) visit within the authorizing provider's specialty    Patient had office visit in the last 6 months or has a visit in the next 30 days with authorizing provider or within the authorizing provider's specialty.  See \"Patient Info\" tab in inbasket, or \"Choose Columns\" in Meds & Orders section of the refill encounter.           Passed - Patient has a recent AST within the past 12 mos.     " Recent Labs   Lab Test 12/24/18  1610   AST 32

## 2019-01-28 ENCOUNTER — PRE VISIT (OUTPATIENT)
Dept: OPHTHALMOLOGY | Facility: CLINIC | Age: 74
End: 2019-01-28

## 2019-01-28 ENCOUNTER — OFFICE VISIT (OUTPATIENT)
Dept: FAMILY MEDICINE | Facility: CLINIC | Age: 74
End: 2019-01-28
Payer: MEDICARE

## 2019-01-28 ENCOUNTER — PATIENT OUTREACH (OUTPATIENT)
Dept: CARE COORDINATION | Facility: CLINIC | Age: 74
End: 2019-01-28

## 2019-01-28 ENCOUNTER — ANCILLARY PROCEDURE (OUTPATIENT)
Dept: GENERAL RADIOLOGY | Facility: CLINIC | Age: 74
End: 2019-01-28
Payer: MEDICARE

## 2019-01-28 VITALS
HEART RATE: 80 BPM | OXYGEN SATURATION: 97 % | RESPIRATION RATE: 18 BRPM | WEIGHT: 293 LBS | TEMPERATURE: 98 F | BODY MASS INDEX: 47.09 KG/M2 | DIASTOLIC BLOOD PRESSURE: 78 MMHG | HEIGHT: 66 IN | SYSTOLIC BLOOD PRESSURE: 132 MMHG

## 2019-01-28 DIAGNOSIS — T17.908A ASPIRATION INTO AIRWAY, INITIAL ENCOUNTER: Primary | ICD-10-CM

## 2019-01-28 DIAGNOSIS — R06.02 SOB (SHORTNESS OF BREATH): Primary | ICD-10-CM

## 2019-01-28 DIAGNOSIS — J69.0 ASPIRATION PNEUMONITIS (H): ICD-10-CM

## 2019-01-28 PROCEDURE — 71046 X-RAY EXAM CHEST 2 VIEWS: CPT

## 2019-01-28 PROCEDURE — 99213 OFFICE O/P EST LOW 20 MIN: CPT | Performed by: FAMILY MEDICINE

## 2019-01-28 ASSESSMENT — ACTIVITIES OF DAILY LIVING (ADL): DEPENDENT_IADLS:: INDEPENDENT

## 2019-01-28 ASSESSMENT — MIFFLIN-ST. JEOR: SCORE: 1905.68

## 2019-01-28 NOTE — PROGRESS NOTES
Clinic Care Coordination Contact    Clinic Care Coordination Contact  OUTREACH    Referral Information:       Primary Diagnosis: Respiratory Disorders - other(weakness low blood sugar )    Chief Complaint   Patient presents with     Clinic Care Coordination - Follow-up     Clinic Care Coordination RN         Universal Utilization:  ED visit [facility] 12/24  SOB cough   Clinic Utilization  Difficulty keeping appointments:: No  Compliance Concerns: No  No PCP office visit in Past Year: No  Utilization    Last refreshed: 1/28/2019  4:50 AM:  Hospital Admissions 4           Last refreshed: 1/28/2019  4:50 AM:  ED Visits 2           Last refreshed: 1/28/2019  4:50 AM:  No Show Count (past year) 7              Current as of: 1/28/2019  4:50 AM              Clinical Concerns:  Current Medical Concerns:  Patient reports she has difficulty swallowing at times and had a choking episode yesterday   Noticed some chest soreness and some wheezing   Patient has an appointment with Dr Connell this afternoon to listen to her lungs and have a chest xray      Pain  Pain (GOAL):: Yes  Location of chronic pain:: Back Fibermyalgia  Progression: Unchanged  Health Maintenance Reviewed:    Clinical Pathway: Clinic Care Coordination COPD Assessment    Symptom Review:      Are you having any increased shortness of breath? No  Symptoms  Anxiety: No  Chest pain: : Yes  Patient complains of: : other(choked on food Sunday)  Pain is characterized as: : dull    Do you have a COPD Action Plan? Yes  Is the COPD Action Plan on refrigerator or available: Yes   What number would you call if you were in the YELLOW zones:  [phone]    Medications:    Were you started on any new medications?  No  Were any of your previous medications changed? No  Do you have all of your medications? Yes,     Are you medications effective in controlling your symptoms? Yes,     Activity:    How much activity can you do before you are SOB? Very little  pacing activity     Emotions/Lifestyle:     Do you smoke?  reports that she quit smoking about 29 years ago. Her smoking use included cigarettes. She smoked 0.00 packs per day for 27.00 years. she has never used smokeless tobacco        Medication Management:  Albuterol inhaler discussed     Functional Status:  Dependent IADLs:: Independent  Bed or wheelchair confined:: No    Diet/Exercise/Sleep:  Food Insecurity: No  Tube Feeding: No  Exercise:: Unable to exercise  Inadequate activity/exercise (GOAL):: No  Significant changes in sleep pattern (GOAL): No    Transportation:  Transportation concerns (GOAL):: No  Transportation means:: Family     Psychosocial:        Financial/Insurance:   Financial/Insurance concerns (GOAL):: No  Supplies used at home:: None       Referrals Placed: None     Goals:   Goals        General    Medical (pt-stated)     Notes - Note edited  1/2/2019  3:17 PM by Senia Durán RN    Goal Statement: I will monitor shortness of breath episodes   Measure of Success: More energy for daily activity   Supportive Steps to Achieve:   I will finish course of antibiotic and Prednisone   I will seek medical help if experiencing increased shortness of breath episodes   Barriers: None identified   Strengths: supportive room mate   Date to Achieve By: ongoing    Patient expressed understanding of goal: Yes       Pain Management (pt-stated)     Notes - Note edited  1/2/2019  3:18 PM by Senia Durán RN    Goal Statement: I will decrease back pain   Measure of Success: Increased mobility with daily activity   Supportive Steps to Achieve:   I will start taking Tylenol 100 mg 3 times a day per Dr William  I will take Lyrica as directed    I will use heat ice and rest   I will use my cane for safety  Barriers: Deconditioned   Strengths: Continues home care services   Date to Achieve By: to be determined   Patient expressed understanding of goal: yes                  Outreach Frequency: weekly  Future  Appointments              Today Amee Connell MD [facility],     In 2 days Noah Girard MD [facility]    In 4 days 1, Ur Cardiac Rehab [facility], Cardiac Rehabilitation, [place]    In 1 week 1, Ur Cardiac Rehab [facility], Cardiac Rehabilitation, [place]    In 2 weeks 1, Ur Cardiac Rehab [facility], Cardiac Rehabilitation, [place]    In 2 weeks 1, Ur Cardiac Rehab [facility], Cardiac Rehabilitation, [place]    In 3 weeks 1, Ur Cardiac Rehab [facility] [place], Cardiac Rehabilitation, [place]    In 3 weeks 1, Ur Cardiac Rehab [facility], Cardiac Rehabilitation, [place]    In 3 weeks 1, Ur Cardiac Rehab [facility], Cardiac Rehabilitation, [place]    In 4 weeks 1, Ur Cardiac Rehab [facility], Cardiac Rehabilitation, [place]    In 1 month 1, Ur Cardiac Rehab [facility], Cardiac Rehabilitation, [place]    In 1 month 1, Ur Cardiac Rehab [facility], Cardiac Rehabilitation, [place]    In 1 month  LAB [facility] Lab, [facility]    In 1 month Stephanie Wolf [facility]    In 2 months Keven Jaeger MD [facility] Endocrinology, [facility]          Plan:   Patient will continue to use Albuterol inhaler as directed   Patient will keep appointment today with Dr Connell   CC will follow up in 3-5 business days     Senia Durán RN / Clinical Care Coordinator     [facility]   [email or web address]  Office :  [phone] / Fax :  [phone]

## 2019-01-28 NOTE — PROGRESS NOTES
SUBJECTIVE:   Mariel Ribeiro is a 73 year old female who presents to clinic today for the following health issues:    Choked on some lunch yesterday, concern of aspiration. Pt states back hurts where lungs are. Would like xray.  States has history of swallowing disorder.      Denies any fever or chills.  She feels she has been coughing more since choking but cough has continued to be dry and nonproductive.  It has not changed from baseline.    PCP Dr. William.  This is my first time meeting patient.  Complicated past medical history.  Lives with roommate x 30 years.    Problem list and histories reviewed & adjusted, as indicated.  Additional history: as documented    Patient Active Problem List   Diagnosis     Hypothyroidism      HX PHYSICAL ABUSE     Coronary atherosclerosis     Myalgia and myositis     Migraine     Chronic airway obstruction/ COPD     Mononeuritis     FAMILY HX-THROMBOSIS     Obstructive sleep apnea     Vitamin D deficiency     Hyperuricemia     Hypertension goal BP (blood pressure) < 130/80     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)     Hyperlipidemia with target LDL less than 70     Chronic diastolic heart failure (H)     Intermediate coronary syndrome (H)     Osteoarthritis     Morbid obesity (H)     Arthritis of knee     Constipation     Hypokalemia     Transient cerebral ischemia     Pain in joint of left shoulder     History of thyroid cancer     Cervicalgia     Cholelithiasis     Pancreatitis, acute     Fatty liver disease, nonalcoholic     Hepatomegaly     Angina at rest (H)     S/P CABG x 3     Type 2 diabetes mellitus with stage 3 chronic kidney disease, with long-term current use of insulin (H)     Lymphedema     Syncope     Lower back pain     Falls     Thrombocytopenia (H)     Bilateral carotid artery disease (H)     Spinal stenosis of lumbar region, unspecified whether neurogenic claudication present     Cervical stenosis of spinal canal     Past Surgical History:    Procedure Laterality Date     ANGIOGRAPHY  8/11    with stent placement X2 mid- and proximal LAD     ARTHROPLASTY KNEE  1/28/2014    Procedure: ARTHROPLASTY KNEE;  ARTHROPLASTY KNEE -RIGHT TOTAL  ;  Surgeon: Victor Manuel Wolff MD;  Location: [facility] OR     BYPASS GRAFT ARTERY CORONARY N/A 7/2/2018    Procedure: BYPASS GRAFT ARTERY CORONARY;  Median Sternotomy, On Cardiopulmonary Bypass Pump, Coronary Artery Bypass Graft x 3, using Left Internal Mammary and Endoscopic Vein [place] on Bilateral Saphenous Vein, Transesophageal Echocardiogram, Epi-aortic Ultrasound;  Surgeon: Joao Mason MD;  Location: [facility] OR     C TOTAL KNEE ARTHROPLASTY  7/30/04    Knee Replacement, Total right and left     CATARACT IOL, RT/LT Bilateral      COLONOSCOPY       DILATION AND CURETTAGE, OPERATIVE HYSTEROSCOPY WITH MORCELLATOR, COMBINED N/A 11/1/2016    Procedure: COMBINED DILATION AND CURETTAGE, OPERATIVE HYSTEROSCOPY WITH MORCELLATOR;  Surgeon: Stacey Arnold DO;  Location: [facility] INTRODUCE NEEDLE/CATH, EXTREMITY ARTERY  1999,2002,2004    Angiocardiogram     HC KNEE SCOPE, DIAGNOSTIC  1990's    Arthroscopy, Knee, bilateral     LAPAROSCOPIC CHOLECYSTECTOMY N/A 8/11/2017    Procedure: LAPAROSCOPIC CHOLECYSTECTOMY;  Laparoscopic Cholecystectomy   *Latex Allergy*, Anesthesia Block;  Surgeon: Jeffrey Roberson MD;  Location: [facility] OR     PHACOEMULSIFICATION CLEAR CORNEA WITH STANDARD INTRAOCULAR LENS IMPLANT Right 12/29/2014    Procedure: PHACOEMULSIFICATION CLEAR CORNEA WITH STANDARD INTRAOCULAR LENS IMPLANT;  Surgeon: Smith Quintana MD;  Location: [facility]     PHACOEMULSIFICATION CLEAR CORNEA WITH TORIC INTRAOCULAR LENS IMPLANT Left 1/12/2015    Procedure: PHACOEMULSIFICATION CLEAR CORNEA WITH TORIC INTRAOCULAR LENS IMPLANT;  Surgeon: Smith Quintana MD;  Location: [facility]     SURGICAL HISTORY OF -   1963    dentures     SURGICAL HISTORY OF -   1985    thyroidectomy     SURGICAL HISTORY OF -   1998    right  thumb surgery     SURGICAL HISTORY OF -       right breast biopsy (benign)     SURGICAL HISTORY OF -   2004    left shoulder surgery - rotator cuff     SURGICAL HISTORY OF -       left thumb surgery     THYROIDECTOMY         Social History     Tobacco Use     Smoking status: Former Smoker     Packs/day: 0.00     Years: 27.00     Pack years: 0.00     Types: Cigarettes     Last attempt to quit: 1989     Years since quittin.5     Smokeless tobacco: Never Used   Substance Use Topics     Alcohol use: No     Alcohol/week: 0.0 oz     Family History   Problem Relation Age of Onset     C.A.D. Mother      Diabetes Mother      Hypertension Mother      Blood Disease Mother         multiple episodes of thrombosis     Circulatory Mother         DVT X 2; ocular clot; cerebral; carotid artery stenosis     Glaucoma Mother      Macular Degeneration Mother      C.A.D. Father      Hypertension Father      Cerebrovascular Disease Father      Alcohol/Drug Father         etoh     Cancer Brother         liver,pancreas, brain     Cardiovascular Sister      Hypertension Sister      Hypertension Brother      Alcohol/Drug Sister         etoh     Alcohol/Drug Brother         drug     Diabetes Sister         younger     C.A.D. Sister         CABG age 65     C.A.D. Brother         CABG age 42     C.A.D. Sister         stents age 58     C.A.D. Brother      Genitourinary Problems Sister         kidney disease         Current Outpatient Medications   Medication Sig Dispense Refill     acetaminophen (TYLENOL) 325 MG tablet Take 2 tablets (650 mg) by mouth every 4 hours as needed for mild pain 100 tablet      albuterol (PROAIR HFA) 108 (90 Base) MCG/ACT inhaler Inhale 1-2 puffs into the lungs every 4 hours as needed for wheezing 1 Inhaler PRN     aspirin  MG tablet Take 1 tablet by mouth daily. 30 tablet 0     atorvastatin (LIPITOR) 40 MG tablet Take 1 tablet (40 mg) by mouth daily 30 tablet 0     blood glucose monitoring (NO  BRAND SPECIFIED) meter device kit Use to test blood sugar four times daily or as directed. 1 kit 0     blood glucose monitoring (NO BRAND SPECIFIED) test strip 1 strip by In Vitro route 2 times daily Strips per patient's preference 200 each 3     bumetanide (BUMEX) 2 MG tablet Take 1 tablet (2 mg) by mouth every 48 hours May take 1mg extra as directed by [facility] clinic. 45 tablet 3     escitalopram (LEXAPRO) 20 MG tablet Take 1 tablet (20 mg) by mouth every morning 30 tablet 0     ferrous gluconate (FERGON) 324 (38 Fe) MG tablet Take 1 tablet (324 mg) by mouth Every Mon, Wed, Fri Morning 36 tablet 3     FOLIC ACID PO Take 1 mg by mouth daily        guaiFENesin (MUCINEX) 600 MG 12 hr tablet Take 1 tablet (600 mg) by mouth 2 times daily       insulin isophane human (HUMULIN N KWIKPEN) 100 UNIT/ML injection Inject 28 units subcutaneous each am and each pm. On rehab days, 8 units in NPH and 28 units in the PM 15 mL 3     levothyroxine (SYNTHROID/LEVOTHROID) 150 MCG tablet Take 1 tablet (150 mcg) by mouth daily 30 tablet 0     VservCAN FINEPOINT LANCETS MISC Use to test blood sugars 2 times daily or as directed. 100 each prn     lifitegrast (XIIDRA) 5 % opthalmic solution Place 1 drop into both eyes 2 times daily 1 each 11     losartan (COZAAR) 25 MG tablet Take 2 tablets (50 mg) by mouth daily 60 tablet 3     LYRICA 100 MG capsule TAKE 1 CAPSULE BY MOUTH TWICE DAILY 180 capsule 0     Metoprolol Tartrate 75 MG TABS Take 75 mg by mouth 2 times daily 180 tablet 3     niacin (NIASPAN) 500 MG CR tablet Take 500 mg by mouth daily   9     nitroGLYcerin (NITROSTAT) 0.4 MG sublingual tablet For chest pain place 1 tablet under the tongue every 5 minutes for 3 doses. If symptoms persist 5 minutes after 1st dose call 911. 25 tablet 0     ONE TOUCH ULTRA (DEVICES) MISC test blood sugar BID 1 0     potassium chloride ER (K-DUR/KLOR-CON M) 10 MEQ CR tablet Take 10 mEq Tuesday, Thurs, Sat, Sun. Take 20 MeQ Monday, Wednesday, Friday. 360  tablet 3     repaglinide (PRANDIN) 1 MG tablet Take 1 mg with meals.  Hold Prandin in the am and at noon on days you have rehab. 90 tablet 3     senna-docusate (SENOKOT-S;PERICOLACE) 8.6-50 MG per tablet Take 1-2 tablets by mouth 2 times daily as needed for constipation 30 tablet 0     traZODone (DESYREL) 50 MG tablet Take 3 tablets (150 mg) by mouth At Bedtime 270 tablet 1     VITAMIN D, CHOLECALCIFEROL, PO Take 10,000 Units by mouth daily       EPINEPHrine (EPIPEN/ADRENACLICK/OR ANY BX GENERIC EQUIV) 0.3 MG/0.3ML injection 2-pack Inject 0.3 mLs (0.3 mg) into the muscle once as needed for anaphylaxis (Patient not taking: Reported on 1/28/2019) 0.6 mL 0     Allergies   Allergen Reactions     Contrast Dye Anaphylaxis     Fish Allergy Anaphylaxis     Iodine Anaphylaxis     Oxycodone Other (See Comments)     Severe suicidal tendencies on this medication     Tree Nuts [Nuts] Anaphylaxis     Tree nuts only (peanuts ok)     Albuterol Other (See Comments)     Sandrita-oral erythema  Confirmed 7/3/18 that patient uses albuterol inhalers at home. Patient had her home inhaler in her bag.     Bactrim      Increased uric acid     Betadine [Povidone Iodine] Hives, Swelling and Difficulty breathing     Betadine     Combivent      Rash     Dulaglutide Other (See Comments)     Hx . Of thyroid cancer.     Lisinopril Other (See Comments)     Scr/grf severely reduced.      Penicillins Rash     Allopurinol Rash     Latex Rash     Wool Fiber Rash     Recent Labs   Lab Test 12/24/18  1713 12/24/18  1610 12/20/18  1508  09/10/18  0614  08/28/18  1427  07/27/18  1427  07/01/18  0621  03/19/18  1404  03/08/17  1420  03/30/16  1343   A1C  --   --   --   --  5.2  --   --   --   --   --  9.3*  --  8.2*   < > 7.2*   < >  --    LDL  --   --   --   --   --   --   --   --   --   --   --   --  111*  --  89  --  89   HDL  --   --   --   --   --   --   --   --   --   --   --   --  50  --  50  --  55   TRIG  --   --   --   --   --   --   --   --   --   --  "  --   --  212*  --  237*  --  180*   ALT  --  49  --   --   --   --   --   --  44  --  26   < > 24   < >  --    < >  --    CR  --  1.19* 1.22*   < > 1.48*   < > 1.41*   < > 1.38*   < >  --    < > 1.28*   < > 1.19*   < >  --    GFRESTIMATED  --  45* 44*   < > 35*   < > 37*   < > 38*   < >  --    < > 41*   < > 45*   < >  --    GFRESTBLACK  --  53* 51*   < > 42*   < > 44*   < > 45*   < >  --    < > 50*   < > 54*   < >  --    POTASSIUM 4.1 4.5 5.0   < > 3.9   < > 4.0   < > 3.7   < >  --    < > 3.6   < > 3.8   < >  --    TSH  --   --   --   --   --   --  2.97  --   --   --   --   --  1.15   < > 0.12*   < >  --     < > = values in this interval not displayed.      BP Readings from Last 3 Encounters:   01/28/19 132/78   01/02/19 142/70   12/24/18 156/64    Wt Readings from Last 3 Encounters:   01/28/19 137.9 kg (304 lb)   12/24/18 137.9 kg (304 lb)   12/11/18 135.6 kg (299 lb)                    Reviewed and updated as needed this visit by clinical staff  Tobacco  Allergies  Meds       Reviewed and updated as needed this visit by Provider         ROS:  Constitutional, HEENT, cardiovascular, pulmonary, gi and gu systems are negative, except as otherwise noted.    OBJECTIVE:     /78   Pulse 80   Temp 98  F (36.7  C) (Oral)   Resp 18   Ht 1.676 m (5' 6\")   Wt 137.9 kg (304 lb)   SpO2 97%   BMI 49.07 kg/m    Body mass index is 49.07 kg/m .  GENERAL: healthy, alert and no distress  EYES: Eyes grossly normal to inspection, PERRL and conjunctivae and sclerae normal  NECK: no adenopathy, no asymmetry, masses, or scars and thyroid normal to palpation  RESP: lungs clear to auscultation - no rales, rhonchi or wheezes  CV: regular rate and rhythm, normal S1 S2, no S3 or S4, no murmur, click or rub, no peripheral edema and peripheral pulses strong  ABDOMEN: soft, nontender, no hepatosplenomegaly, no masses and bowel sounds normal  MS: no gross musculoskeletal defects noted, no edema  SKIN: no suspicious lesions or " rashes  PSYCH: mentation appears normal, affect normal/bright    CXR unremarkable    ASSESSMENT/PLAN:     1. Aspiration into airway, initial encounter    - XR Chest 2 Views; Future    Patient reassured that CXR is normal today.  Discussed that choking on food or fluids does not always mean an aspiration pneumonia will ensue.  Recommended close follow up if symptoms should change or worsen this week for further evaluation and treatment prn.    Amee Connell MD  [facility]

## 2019-01-31 RX ORDER — REPAGLINIDE 1 MG/1
TABLET ORAL
Qty: 90 TABLET | Refills: 0 | OUTPATIENT
Start: 2019-01-31

## 2019-01-31 NOTE — TELEPHONE ENCOUNTER
Med refused with note to pharmacy that endocrinologist to approve refill.    CHEN Rivers, BSN, RN

## 2019-02-07 ENCOUNTER — NURSE TRIAGE (OUTPATIENT)
Dept: NURSING | Facility: CLINIC | Age: 74
End: 2019-02-07

## 2019-02-08 NOTE — TELEPHONE ENCOUNTER
Patient calling reporting she fell on 2/3/2019 and hit her head and back. Reports having headache. States having double vision with green floater. Reports feeling like she is going to faint. Advised patient to call 911. Refused disposition. States she will be seen tomorrow.     Chilo Duff RN  [facility]     Reason for Disposition    [1] ACUTE NEURO SYMPTOM AND [2] present now  (DEFINITION: difficult to awaken OR confused thinking and talking OR slurred speech OR weakness of arms OR unsteady walking)    Protocols used: HEAD INJURY-ADULT-

## 2019-02-12 ENCOUNTER — PATIENT OUTREACH (OUTPATIENT)
Dept: CARE COORDINATION | Facility: CLINIC | Age: 74
End: 2019-02-12

## 2019-02-12 DIAGNOSIS — R07.9 CHEST PAIN, UNSPECIFIED TYPE: Primary | ICD-10-CM

## 2019-02-12 ASSESSMENT — ACTIVITIES OF DAILY LIVING (ADL): DEPENDENT_IADLS:: INDEPENDENT

## 2019-02-12 NOTE — PROGRESS NOTES
Clinic Care Coordination Contact    Clinic Care Coordination Contact  OUTREACH    Referral Information:       Primary Diagnosis: Injury/Fall(weakness low blood sugar )    Chief Complaint   Patient presents with     Clinic Care Coordination - Follow-up     Clinic Care Coordination RN         [place] Utilization:ED visit [facility] 12/24  SOB cough   Clinic Utilization  Difficulty keeping appointments:: No  Compliance Concerns: No  No PCP office visit in Past Year: No  Utilization    Last refreshed: 2/12/2019  3:58 AM:  Hospital Admissions 4           Last refreshed: 2/12/2019  3:58 AM:  ED Visits 2           Last refreshed: 2/12/2019  3:58 AM:  No Show Count (past year) 7              Current as of: 2/12/2019  3:58 AM              Clinical Concerns:   Patient complains of headache , back and shoulder discomfort due to recent fall 2/3  Patient was told to go to ED but refused prefers to see Dr William tomorrow .     Patient was told she might have had a concussion  Future OT/PT needed.        No further swallowing difficulty at this time   Current Medical Concerns:  Talked mainly about recent fall 2/3/2019-    Pain  Pain (GOAL):: Yes  Location of chronic pain:: Back Fibermyalgia  Progression: Unchanged  Health Maintenance Reviewed:    Clinical Pathway:Not discussed today     Medication Management:  Not discussed today     Functional Status:  Dependent IADLs:: Independent  Bed or wheelchair confined:: No        Diet/Exercise/Sleep:  Food Insecurity: No  Tube Feeding: No  Exercise:: Unable to exercise  Inadequate activity/exercise (GOAL):: No  Significant changes in sleep pattern (GOAL): No    Transportation:  Transportation concerns (GOAL):: No  Transportation means:: Family     Psychosocial:      Financial/Insurance concerns (GOAL):: No     Supplies used at home:: Nebulizer tubing      Goals:   Goals        General    Medical (pt-stated)     Notes - Note edited  1/28/2019  1:49 PM by Senia Durán  RN    Goal Statement: I will monitor shortness of breath episodes   Measure of Success: More energy for daily activity   Supportive Steps to Achieve:   I will finish course of antibiotic and Prednisone   I will seek medical help if experiencing increased shortness of breath episodes   I will follow  COPD Action Plan   Barriers: None identified   Strengths: supportive room mate   Date to Achieve By: 3/28/2019  Patient expressed understanding of goal: Yes       Pain Management (pt-stated)     Notes - Note edited  1/28/2019  1:48 PM by Senia Durán, RN    Goal Statement: I will decrease back pain   Measure of Success: Increased mobility with daily activity   Supportive Steps to Achieve:   I will start taking Tylenol 100 mg 3 times a day per Dr William  I will take Lyrica as directed    I will use heat ice and rest   I will use my cane for safety  Barriers: Deconditioned   Strengths: Continues home care services   Date to Achieve By: 2/28/2019  Patient expressed understanding of goal: yes                Outreach Frequency: weekly  Future Appointments              Tomorrow Rafaela William MD [facility]    In 3 days 1, Ur [facility], [place]    In 6 days 1, Ur [facility], [place]    In 1 week 1, Ur [facility], [place]    In 1 week 1, Ur [facility], [place]    In 1 week 1, Ur [facility], [place]    In 2 weeks 1, Ur [facility], [place]    In 2 weeks 1, Ur [facility], [place]    In 4 weeks  [facility]    In 4 weeks Stephanie Wolf [facility]    In 1 month Keven Jaeger MD [facility] Endocrinology, [facility]          Plan: CC will meet the  patient face to face at PCP visit tomorrow     Senia Durán RN / Clinical Care Coordinator     [facility]   [email or web address]  Office :  [phone] / Fax :  [phone]

## 2019-02-13 ENCOUNTER — APPOINTMENT (OUTPATIENT)
Dept: CT IMAGING | Facility: CLINIC | Age: 74
End: 2019-02-13
Attending: FAMILY MEDICINE
Payer: MEDICARE

## 2019-02-13 ENCOUNTER — HOSPITAL ENCOUNTER (EMERGENCY)
Facility: CLINIC | Age: 74
Discharge: HOME | End: 2019-02-13
Attending: FAMILY MEDICINE | Admitting: FAMILY MEDICINE
Payer: MEDICARE

## 2019-02-13 ENCOUNTER — TELEPHONE (OUTPATIENT)
Dept: FAMILY MEDICINE | Facility: CLINIC | Age: 74
End: 2019-02-13

## 2019-02-13 VITALS
SYSTOLIC BLOOD PRESSURE: 145 MMHG | BODY MASS INDEX: 50.36 KG/M2 | WEIGHT: 293 LBS | RESPIRATION RATE: 10 BRPM | OXYGEN SATURATION: 96 % | HEART RATE: 56 BPM | DIASTOLIC BLOOD PRESSURE: 74 MMHG | TEMPERATURE: 97.5 F

## 2019-02-13 DIAGNOSIS — R29.6 RECURRENT FALLS: ICD-10-CM

## 2019-02-13 DIAGNOSIS — R55 SYNCOPE AND COLLAPSE: ICD-10-CM

## 2019-02-13 LAB
ALBUMIN SERPL-MCNC: 3.4 G/DL (ref 3.4–5)
ALP SERPL-CCNC: 104 U/L (ref 40–150)
ALT SERPL W P-5'-P-CCNC: 19 U/L (ref 0–50)
ANION GAP SERPL CALCULATED.3IONS-SCNC: 6 MMOL/L (ref 3–14)
AST SERPL W P-5'-P-CCNC: 20 U/L (ref 0–45)
BASOPHILS # BLD AUTO: 0 10E9/L (ref 0–0.2)
BASOPHILS NFR BLD AUTO: 0.5 %
BILIRUB SERPL-MCNC: 0.3 MG/DL (ref 0.2–1.3)
BUN SERPL-MCNC: 23 MG/DL (ref 7–30)
CALCIUM SERPL-MCNC: 8.9 MG/DL (ref 8.5–10.1)
CHLORIDE SERPL-SCNC: 106 MMOL/L (ref 94–109)
CO2 SERPL-SCNC: 31 MMOL/L (ref 20–32)
CREAT SERPL-MCNC: 1.42 MG/DL (ref 0.52–1.04)
DIFFERENTIAL METHOD BLD: ABNORMAL
EOSINOPHIL # BLD AUTO: 0.2 10E9/L (ref 0–0.7)
EOSINOPHIL NFR BLD AUTO: 1.8 %
ERYTHROCYTE [DISTWIDTH] IN BLOOD BY AUTOMATED COUNT: 13.7 % (ref 10–15)
GFR SERPL CREATININE-BSD FRML MDRD: 37 ML/MIN/{1.73_M2}
GLUCOSE BLDC GLUCOMTR-MCNC: 63 MG/DL (ref 70–99)
GLUCOSE BLDC GLUCOMTR-MCNC: 72 MG/DL (ref 70–99)
GLUCOSE BLDC GLUCOMTR-MCNC: 80 MG/DL (ref 70–99)
GLUCOSE SERPL-MCNC: 72 MG/DL (ref 70–99)
HCT VFR BLD AUTO: 40.7 % (ref 35–47)
HGB BLD-MCNC: 12.8 G/DL (ref 11.7–15.7)
IMM GRANULOCYTES # BLD: 0 10E9/L (ref 0–0.4)
IMM GRANULOCYTES NFR BLD: 0.1 %
LYMPHOCYTES # BLD AUTO: 2 10E9/L (ref 0.8–5.3)
LYMPHOCYTES NFR BLD AUTO: 24.4 %
MCH RBC QN AUTO: 28.1 PG (ref 26.5–33)
MCHC RBC AUTO-ENTMCNC: 31.4 G/DL (ref 31.5–36.5)
MCV RBC AUTO: 89 FL (ref 78–100)
MONOCYTES # BLD AUTO: 0.6 10E9/L (ref 0–1.3)
MONOCYTES NFR BLD AUTO: 7.4 %
NEUTROPHILS # BLD AUTO: 5.5 10E9/L (ref 1.6–8.3)
NEUTROPHILS NFR BLD AUTO: 65.8 %
NRBC # BLD AUTO: 0 10*3/UL
NRBC BLD AUTO-RTO: 0 /100
PLATELET # BLD AUTO: 119 10E9/L (ref 150–450)
POTASSIUM SERPL-SCNC: 4 MMOL/L (ref 3.4–5.3)
PROT SERPL-MCNC: 6.7 G/DL (ref 6.8–8.8)
RBC # BLD AUTO: 4.56 10E12/L (ref 3.8–5.2)
SODIUM SERPL-SCNC: 143 MMOL/L (ref 133–144)
TROPONIN I SERPL-MCNC: <0.015 UG/L (ref 0–0.04)
WBC # BLD AUTO: 8.3 10E9/L (ref 4–11)

## 2019-02-13 PROCEDURE — 99285 EMERGENCY DEPT VISIT HI MDM: CPT | Mod: 25

## 2019-02-13 PROCEDURE — 80053 COMPREHEN METABOLIC PANEL: CPT | Performed by: FAMILY MEDICINE

## 2019-02-13 PROCEDURE — A9270 NON-COVERED ITEM OR SERVICE: HCPCS | Mod: GY | Performed by: FAMILY MEDICINE

## 2019-02-13 PROCEDURE — 93005 ELECTROCARDIOGRAM TRACING: CPT

## 2019-02-13 PROCEDURE — 25000132 ZZH RX MED GY IP 250 OP 250 PS 637: Mod: GY | Performed by: FAMILY MEDICINE

## 2019-02-13 PROCEDURE — 00000146 ZZHCL STATISTIC GLUCOSE BY METER IP

## 2019-02-13 PROCEDURE — 99284 EMERGENCY DEPT VISIT MOD MDM: CPT | Mod: Z6 | Performed by: FAMILY MEDICINE

## 2019-02-13 PROCEDURE — 85025 COMPLETE CBC W/AUTO DIFF WBC: CPT | Performed by: FAMILY MEDICINE

## 2019-02-13 PROCEDURE — 70450 CT HEAD/BRAIN W/O DYE: CPT

## 2019-02-13 PROCEDURE — 84484 ASSAY OF TROPONIN QUANT: CPT | Performed by: FAMILY MEDICINE

## 2019-02-13 RX ORDER — ACETAMINOPHEN 500 MG
1000 TABLET ORAL ONCE
Status: COMPLETED | OUTPATIENT
Start: 2019-02-13 | End: 2019-02-13

## 2019-02-13 RX ADMIN — ACETAMINOPHEN 1000 MG: 500 TABLET, FILM COATED ORAL at 15:52

## 2019-02-13 ASSESSMENT — ENCOUNTER SYMPTOMS
HEADACHES: 1
LIGHT-HEADEDNESS: 1
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
## 2019-02-13 NOTE — TELEPHONE ENCOUNTER
S-(situation): patient called by clinic as provider not in office today and she had an appointment after head injury - transferred to RN triage Emergent line     B-(background): patient has fallen twice in the past 2 weeks and hit her head. Patient has cardiac history. History of low blood sugar. History of dizziness.     A-(assessment):   Patient states she hit her head 2 days ago on a door knob in her home after falling due to dizziness   Patient has DIZZINESS, HEADACHE, BLURRED VISION   Patient is not sure if she had LOC   Patient has left shoulder pain and left hip pain     R-(recommendations): writer advised ER visit instead of waiting for clinic appointment 2/15/19 due to dizziness, blurred vision, headache.     Patient is unsure when she will get there as she has someone coming over later however she is concerned with her dog - discussed EMS transport and she does not want to do that as they will not be able to help with her dog and her room mate is going to work     Writer did advise Senia Durán RN CC of this conversation today as she had spoken with the patient yesterday     Elizabeth Luu, Registered Nurse   [facility]

## 2019-02-13 NOTE — ED AVS SNAPSHOT
[facility], [place], Emergency Department  [address]  [facility] [address]  Phone:  [phone]  Fax:  [phone]                                    Mariel Ribeiro   MRN: [id number]    Department:  [facility], Emergency Department   Date of Visit:  2/13/2019           After Visit Summary Signature Page    I have received my discharge instructions, and my questions have been answered. I have discussed any challenges I see with this plan with the nurse or doctor.    ..........................................................................................................................................  Patient/Patient Representative Signature      ..........................................................................................................................................  Patient Representative Print Name and Relationship to Patient    ..................................................               ................................................  Date                                   Time    ..........................................................................................................................................  Reviewed by Signature/Title    ...................................................              ..............................................  Date                                               Time          22EPIC Rev 08/18

## 2019-02-14 LAB — INTERPRETATION ECG - MUSE: NORMAL

## 2019-02-15 ENCOUNTER — OFFICE VISIT (OUTPATIENT)
Dept: FAMILY MEDICINE | Facility: CLINIC | Age: 74
End: 2019-02-15
Payer: MEDICARE

## 2019-02-15 VITALS
OXYGEN SATURATION: 98 % | TEMPERATURE: 97.6 F | DIASTOLIC BLOOD PRESSURE: 59 MMHG | SYSTOLIC BLOOD PRESSURE: 119 MMHG | RESPIRATION RATE: 18 BRPM | HEART RATE: 53 BPM

## 2019-02-15 DIAGNOSIS — M54.5 BILATERAL LOW BACK PAIN, UNSPECIFIED CHRONICITY, WITH SCIATICA PRESENCE UNSPECIFIED: ICD-10-CM

## 2019-02-15 DIAGNOSIS — H53.9 VISUAL DISTURBANCE: ICD-10-CM

## 2019-02-15 DIAGNOSIS — F07.81 POST CONCUSSION SYNDROME: Primary | ICD-10-CM
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
## 2019-02-15 DIAGNOSIS — I50.32 CHRONIC DIASTOLIC HEART FAILURE (H): Chronic | ICD-10-CM

## 2019-02-15 DIAGNOSIS — M25.512 LEFT SHOULDER PAIN, UNSPECIFIED CHRONICITY: ICD-10-CM

## 2019-02-15 DIAGNOSIS — I25.10 ATHEROSCLEROSIS OF NATIVE CORONARY ARTERY WITHOUT ANGINA PECTORIS, UNSPECIFIED WHETHER NATIVE OR TRANSPLANTED HEART: Chronic | ICD-10-CM

## 2019-02-15 PROCEDURE — 99213 OFFICE O/P EST LOW 20 MIN: CPT | Performed by: FAMILY MEDICINE

## 2019-02-15 ASSESSMENT — PATIENT HEALTH QUESTIONNAIRE - PHQ9: SUM OF ALL RESPONSES TO PHQ QUESTIONS 1-9: 15

## 2019-02-15 NOTE — PROGRESS NOTES
SUBJECTIVE:   Mariel Ribeiro is a 73 year old female who presents to clinic today for the following health issues:    ED/UC Followup:    Facility:  [facility]    Date of visit: 2/13/2019  Reason for visit: recurrent falls   Current Status: pt report she's the still, till have double vision and headache.      Mariel has had two falls in the past week and a half, the second fall occurring 5 days ago and involved losing her balance as she exited the bathroom; she uses her walker at home, but it won't fit in the bathroom, so she leaves it outside the bathroom.  She lost her balance as she came out of the bathroom and hit her left shoulder and head on a door.  No LOC.  She was seen in the ER on 2/13/19; CT scan showed no bleeding or other acute finding.  She continues to have a headache and double vision in both eyes.  She has had concussion before with similar ocular sxs, and has been trying to follow the advices she received from the concussion center.  In fact, Mariel indicates that she has had blurry/double vision ever SINCE the last concussion that never cleared up.    She will be seeing her chiropractor for left shoulder pain this afternoon.   Mariel is going to cardiac rehab today, but asks if she can have a referral for physical therapy and occupational therapy to address the concussion, and to continue working on her heart health.     Patient Active Problem List   Diagnosis     Hypothyroidism      HX PHYSICAL ABUSE     Coronary atherosclerosis     Myalgia and myositis     Migraine     Chronic airway obstruction/ COPD     Mononeuritis     FAMILY HX-THROMBOSIS     Obstructive sleep apnea     Vitamin D deficiency     Hyperuricemia     Hypertension goal BP (blood pressure) < 130/80     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)     Hyperlipidemia with target LDL less than 70     Chronic diastolic heart failure (H)     Intermediate coronary syndrome (H)     Osteoarthritis     Morbid obesity (H)     Arthritis of knee      Constipation     Hypokalemia     Transient cerebral ischemia     Pain in joint of left shoulder     History of thyroid cancer     Cervicalgia     Cholelithiasis     Pancreatitis, acute     Fatty liver disease, nonalcoholic     Hepatomegaly     Angina at rest (H)     S/P CABG x 3     Type 2 diabetes mellitus with stage 3 chronic kidney disease, with long-term current use of insulin (H)     Lymphedema     Syncope     Lower back pain     Falls     Thrombocytopenia (H)     Bilateral carotid artery disease (H)     Spinal stenosis of lumbar region, unspecified whether neurogenic claudication present     Cervical stenosis of spinal canal     Past Surgical History:   Procedure Laterality Date     ANGIOGRAPHY  8/11    with stent placement X2 mid- and proximal LAD     ARTHROPLASTY KNEE  1/28/2014    Procedure: ARTHROPLASTY KNEE;  ARTHROPLASTY KNEE -RIGHT TOTAL  ;  Surgeon: Victor Manuel Wolff MD;  Location: [facility]     BYPASS GRAFT ARTERY CORONARY N/A 7/2/2018    Procedure: BYPASS GRAFT ARTERY CORONARY;  Median Sternotomy, On Cardiopulmonary Bypass Pump, Coronary Artery Bypass Graft x 3, using Left Internal Mammary and Endoscopic Vein Douglas on Bilateral Saphenous Vein, Transesophageal Echocardiogram, Epi-aortic Ultrasound;  Surgeon: Joao Mason MD;  Location: [facility]     C TOTAL KNEE ARTHROPLASTY  7/30/04    Knee Replacement, Total right and left     CATARACT IOL, RT/LT Bilateral      COLONOSCOPY       DILATION AND CURETTAGE, OPERATIVE HYSTEROSCOPY WITH MORCELLATOR, COMBINED N/A 11/1/2016    Procedure: COMBINED DILATION AND CURETTAGE, OPERATIVE HYSTEROSCOPY WITH MORCELLATOR;  Surgeon: Stacey Arnold DO;  Location: [facility] INTRODUCE NEEDLE/CATH, EXTREMITY ARTERY  1999,2002,2004    Angiocardiogram     HC KNEE SCOPE, DIAGNOSTIC  1990's    Arthroscopy, Knee, bilateral     LAPAROSCOPIC CHOLECYSTECTOMY N/A 8/11/2017    Procedure: LAPAROSCOPIC CHOLECYSTECTOMY;  Laparoscopic Cholecystectomy   *Latex Allergy*,  Anesthesia Block;  Surgeon: Jeffrey Roberson MD;  Location: [facility]     PHACOEMULSIFICATION CLEAR CORNEA WITH STANDARD INTRAOCULAR LENS IMPLANT Right 2014    Procedure: PHACOEMULSIFICATION CLEAR CORNEA WITH STANDARD INTRAOCULAR LENS IMPLANT;  Surgeon: Smith Quintana MD;  Location: [facility]     PHACOEMULSIFICATION CLEAR CORNEA WITH TORIC INTRAOCULAR LENS IMPLANT Left 2015    Procedure: PHACOEMULSIFICATION CLEAR CORNEA WITH TORIC INTRAOCULAR LENS IMPLANT;  Surgeon: Smith Quintana MD;  Location: [facility]     SURGICAL HISTORY OF -       dentures     SURGICAL HISTORY OF -       thyroidectomy     SURGICAL HISTORY OF -       right thumb surgery     SURGICAL HISTORY OF -       right breast biopsy (benign)     SURGICAL HISTORY OF -   2004    left shoulder surgery - rotator cuff     SURGICAL HISTORY OF -       left thumb surgery     THYROIDECTOMY         Social History     Tobacco Use     Smoking status: Former Smoker     Packs/day: 0.00     Years: 27.00     Pack years: 0.00     Types: Cigarettes     Last attempt to quit: 1989     Years since quittin.6     Smokeless tobacco: Never Used   Substance Use Topics     Alcohol use: No     Alcohol/week: 0.0 oz     Family History   Problem Relation Age of Onset     C.A.D. Mother      Diabetes Mother      Hypertension Mother      Blood Disease Mother         multiple episodes of thrombosis     Circulatory Mother         DVT X 2; ocular clot; cerebral; carotid artery stenosis     Glaucoma Mother      Macular Degeneration Mother      C.A.D. Father      Hypertension Father      Cerebrovascular Disease Father      Alcohol/Drug Father         etoh     Cancer Brother         liver,pancreas, brain     Cardiovascular Sister      Hypertension Sister      Hypertension Brother      Alcohol/Drug Sister         etoh     Alcohol/Drug Brother         drug     Diabetes Sister         younger     C.A.D. Sister         CABG age 65     C.A.D. Brother          CABG age 42     C.A.D. Sister         stents age 58     C.A.D. Brother      Genitourinary Problems Sister         kidney disease         Current Outpatient Medications   Medication Sig Dispense Refill     acetaminophen (TYLENOL) 325 MG tablet Take 2 tablets (650 mg) by mouth every 4 hours as needed for mild pain 100 tablet      albuterol (PROAIR HFA) 108 (90 Base) MCG/ACT inhaler Inhale 1-2 puffs into the lungs every 4 hours as needed for wheezing 1 Inhaler PRN     aspirin  MG tablet Take 1 tablet by mouth daily. 30 tablet 0     atorvastatin (LIPITOR) 40 MG tablet Take 1 tablet (40 mg) by mouth daily 30 tablet 0     blood glucose monitoring (NO BRAND SPECIFIED) meter device kit Use to test blood sugar four times daily or as directed. 1 kit 0     blood glucose monitoring (NO BRAND SPECIFIED) test strip 1 strip by In Vitro route 2 times daily Strips per patient's preference 200 each 3     bumetanide (BUMEX) 2 MG tablet Take 1 tablet (2 mg) by mouth every 48 hours May take 1mg extra as directed by [facility]. 45 tablet 3     EPINEPHrine (EPIPEN/ADRENACLICK/OR ANY BX GENERIC EQUIV) 0.3 MG/0.3ML injection 2-pack Inject 0.3 mLs (0.3 mg) into the muscle once as needed for anaphylaxis 0.6 mL 0     escitalopram (LEXAPRO) 20 MG tablet Take 1 tablet (20 mg) by mouth every morning 30 tablet 0     ferrous gluconate (FERGON) 324 (38 Fe) MG tablet Take 1 tablet (324 mg) by mouth Every Mon, Wed, Fri Morning 36 tablet 3     FOLIC ACID PO Take 1 mg by mouth daily        guaiFENesin (MUCINEX) 600 MG 12 hr tablet Take 1 tablet (600 mg) by mouth 2 times daily       insulin isophane human (HUMULIN N KWIKPEN) 100 UNIT/ML injection Inject 28 units subcutaneous each am and each pm. On rehab days, 8 units in NPH and 28 units in the PM 15 mL 3     levothyroxine (SYNTHROID/LEVOTHROID) 150 MCG tablet Take 1 tablet (150 mcg) by mouth daily 30 tablet 0     TEAM INTERVAL FINEPOINT LANCETS MISC Use to test blood sugars 2 times daily or as  directed. 100 each prn     lifitegrast (XIIDRA) 5 % opthalmic solution Place 1 drop into both eyes 2 times daily 1 each 11     losartan (COZAAR) 25 MG tablet Take 2 tablets (50 mg) by mouth daily 60 tablet 3     LYRICA 100 MG capsule TAKE 1 CAPSULE BY MOUTH TWICE DAILY 180 capsule 0     Metoprolol Tartrate 75 MG TABS Take 75 mg by mouth 2 times daily 180 tablet 3     niacin (NIASPAN) 500 MG CR tablet Take 500 mg by mouth daily   9     nitroGLYcerin (NITROSTAT) 0.4 MG sublingual tablet For chest pain place 1 tablet under the tongue every 5 minutes for 3 doses. If symptoms persist 5 minutes after 1st dose call 911. 25 tablet 0     ONE TOUCH ULTRA (DEVICES) MISC test blood sugar BID 1 0     potassium chloride ER (K-DUR/KLOR-CON M) 10 MEQ CR tablet Take 10 mEq Tuesday, Thurs, Sat, Sun. Take 20 MeQ Monday, Wednesday, Friday. 360 tablet 3     repaglinide (PRANDIN) 1 MG tablet Take 1 mg with meals.  Hold Prandin in the am and at noon on days you have rehab. 90 tablet 3     senna-docusate (SENOKOT-S;PERICOLACE) 8.6-50 MG per tablet Take 1-2 tablets by mouth 2 times daily as needed for constipation 30 tablet 0     traZODone (DESYREL) 50 MG tablet Take 3 tablets (150 mg) by mouth At Bedtime 270 tablet 1     VITAMIN D, CHOLECALCIFEROL, PO Take 10,000 Units by mouth daily       Allergies   Allergen Reactions     Contrast Dye Anaphylaxis     Fish Allergy Anaphylaxis     Iodine Anaphylaxis     Oxycodone Other (See Comments)     Severe suicidal tendencies on this medication     Tree Nuts [Nuts] Anaphylaxis     Tree nuts only (peanuts ok)     Albuterol Other (See Comments)     Sandrita-oral erythema  Confirmed 7/3/18 that patient uses albuterol inhalers at home. Patient had her home inhaler in her bag.     Bactrim      Increased uric acid     Betadine [Povidone Iodine] Hives, Swelling and Difficulty breathing     Betadine     Combivent      Rash     Dulaglutide Other (See Comments)     Hx . Of thyroid cancer.     Lisinopril Other (See  Comments)     Scr/grf severely reduced.      Penicillins Rash     Allopurinol Rash     Latex Rash     Wool Fiber Rash       Reviewed and updated as needed this visit by clinical staff  Tobacco  Allergies  Meds  Med Hx  Surg Hx  Fam Hx  Soc Hx      Reviewed and updated as needed this visit by Provider         ROS:  Constitutional, HEENT, cardiovascular, pulmonary, gi and gu systems are negative, except as otherwise noted.    OBJECTIVE:     /59 (BP Location: Left arm, Patient Position: Sitting, Cuff Size: Adult Regular)   Pulse 53   Temp 97.6  F (36.4  C) (Oral)   Resp 18   SpO2 98%   There is no height or weight on file to calculate BMI.  GENERAL APPEARANCE: healthy, alert and no distress  EYES: Eyes grossly normal to inspection, PERRL and conjunctivae and sclerae normal  HENT:  nose and mouth without ulcers or lesions  NECK: no adenopathy, left anterior mobile nodule is unchanged; thyroid normal to palpation  RESP: lungs clear to auscultation - no rales, rhonchi or wheezes  CV: regular rates and rhythm, normal S1 S2, no S3 or S4 and no murmur, click or rub  NEURO: CN 2-12 intact; normal fundoscopic exam; mentation intact and speech normal  PSYCH: mentation appears normal and affect normal/bright        ASSESSMENT/PLAN:     74 yo F with complicated medical history including DM2, CAD, CKD, spinal stenosis, polypharmacy, morbid obesity and recurrent falls presents for falls and concussion.          1. Post concussion syndrome  I recommended she return to the concussion clinic for re-evaluation.  She states she would like to just go ahead with physical and occupational therapy.  Will also recommend f/u with ophthalmology; it seems her vision changes are not actually new.      2. Fall, subsequent encounter  Will appreciate therapy evaluation of patient.  - OCCUPATIONAL THERAPY REFERRAL; Future  - PHYSICAL THERAPY REFERRAL; Future    3. Visual disturbance  As above.     4. Atherosclerosis of native  coronary artery without angina pectoris, unspecified whether native or transplanted heart  I would not be the one discharging her from cardiac rehab, but would agree that she requires physical therapy and occupational therapy to address gait and balance as well as falls.   - OCCUPATIONAL THERAPY REFERRAL; Future  - PHYSICAL THERAPY REFERRAL; Future    5. Chronic diastolic heart failure (H)    - OCCUPATIONAL THERAPY REFERRAL; Future  - PHYSICAL THERAPY REFERRAL; Future    6. Bilateral low back pain, unspecified chronicity, with sciatica presence unspecified    - OCCUPATIONAL THERAPY REFERRAL; Future  - PHYSICAL THERAPY REFERRAL; Future    7. Left shoulder pain, unspecified chronicity    - OCCUPATIONAL THERAPY REFERRAL; Future  - PHYSICAL THERAPY REFERRAL; Future        Rafaela William MD  [facility]

## 2019-02-18 ENCOUNTER — PATIENT OUTREACH (OUTPATIENT)
Dept: CARE COORDINATION | Facility: CLINIC | Age: 74
End: 2019-02-18
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
## 2019-02-18 ASSESSMENT — ACTIVITIES OF DAILY LIVING (ADL): DEPENDENT_IADLS:: INDEPENDENT

## 2019-02-18 NOTE — LETTER
[facility]  Complex Care Plan  About Me  Patient Name:  Mariel Ribeiro    YOB: 1945  Age:     73 year old   [place] MRN:   [id number] Telephone Information:  Home Phone [phone]   Mobile [phone]       Address:    [address] [place] [address] Email address:  [email or web address]      Emergency Contact(s)  Name Relationship Lgl Grd Work Phone Home Phone Mobile Phone   1. KOSTAS PIERSON Roommate No [phone]   2. VITA ODOM Sister No  [phone]   3. ORTIZ, SAKSHI Friend No  [phone]           Primary language:  English     needed? No   [place] [facility]:  [phone] op. 1  Other communication barriers: None  Preferred Method of Communication:  Fabian  Current living arrangement:    Mobility Status/ Medical Equipment:      Health Maintenance  Health Maintenance Reviewed:      My Access Plan  Medical Emergency 911   Primary Clinic Line [facility] - [phone]   24 Hour Appointment Line [phone] or  [phone]) (toll-free)   24 Hour Nurse Line [phone] (toll-free)   Preferred Urgent Care     Preferred Hospital     Preferred Pharmacy [id number] - [place], [place] - [address] AT [facility]     Behavioral Health Crisis Line The National Suicide Prevention Lifeline at 1-658.375.5232 or 911     My Care Team Members    Patient Care Team       Relationship Specialty Notifications Start End    Rafaela William MD PCP - General Family Practice  11/17/15     Phone: [phone] Fax: [phone]         [address] [place] [address]    Rafaela William MD PCP - Assigned PCP   10/25/15     Phone: [phone] Fax: [phone]         [address] [place] [address]    Karen Hilton [facility] Pharmacist Pharmacist  9/8/14      [address] [facility] [address]    Caryl  Zeeshan Pritchett MD MD Orthopedics  9/8/14     Phone: [phone] Fax: [phone]         [address]  [facility] [address]    Jefry Hanson DPM  Podiatry  9/8/14     Phone: [phone] Fax: [phone]         [address] [facility] [address]    Tom Cruz MD MD Neurology  5/12/15     Phone: [phone] Fax: [phone]         [address] [facility] [facility] [address]    Rosina Kohler MD MD Family Medicine - Sports Medicine  11/21/16     Phone: [phone] Fax: [phone]         [address] [facility] [address]    Jeffrey Roberson MD MD General Surgery  5/22/17     Phone: [phone] Fax: [phone]         [address] [facility] [address] [facility] [address]    Peter Khan RN Nurse Coordinator Cardiology  7/2/18     Stephanie Wolf MD MD Cardiology  7/2/18     Phone: [phone] Fax: [phone]         [address] [facility] [address] [facility] [address]    Senia Durán, RN Lead Care Coordinator Primary Care -  Admissions 7/17/18     Phone: [phone] Fax: [phone]        Cathi Vaughn APRN CNP Nurse Practitioner Cardiology Admissions 8/24/18     [facility]    Phone: [phone] Pager: [phone] Fax: [phone]        [facility] [address]    Rosina Mays, RN Nurse Coordinator Cardiology Admissions 8/24/18     [facility]    Phone: [phone] Fax: [phone]                My Care Plans  Self Management and Treatment Plan  Goals and (Comments)  Goals        General    # 2 Pain Management (pt-stated)     Notes - Note edited  2/18/2019  1:58 PM by Senia Durán, JODY    Goal Statement: I will decrease back pain   Measure of Success: Increased mobility with daily activity   Supportive Steps to Achieve:   I will start taking Tylenol 100 mg 3 times a day per Dr William  I will take Lyrica as directed    I will use heat ice and rest   I will use my cane for safety  Barriers:  Deconditioned   Strengths: Continues PT OT   Date to Achieve By: 3/28/2019  Patient expressed understanding of goal: yes        # 3 Medical (pt-stated)     Notes - Note created  2/18/2019  2:04 PM by Senia Durán, RN    Goal Statement:I will prevent a future fall   Measure of Success: No falls  Supportive Steps to Achieve:  I will use cane or walker at all times   I will use lifeline when I go to the bathroom because walker doesn't fit   Barriers:Lightheaded /Dizzy leg weakness   Strengths: outpatient PT/OT 3/6  Date to Achieve By: 3/28/2019  Patient expressed understanding of goal: Yes        #1 Medical (pt-stated)     Notes - Note edited  2/18/2019  1:58 PM by Senia Durán, RN    Goal Statement: I will monitor shortness of breath episodes   Measure of Success: More energy for daily activity   Supportive Steps to Achieve:     I will seek medical help if experiencing increased shortness of breath episodes   I will follow  COPD Action Plan   Barriers: None identified   Strengths: supportive room mate   Date to Achieve By: 3/28/2019  Patient expressed understanding of goal: Yes              Action Plans on File:            Depression COPD/CHF           Advance Care Plans/Directives Type: Yes       My Medical and Care Information  Problem List   Patient Active Problem List   Diagnosis     Hypothyroidism      HX PHYSICAL ABUSE     Coronary atherosclerosis     Myalgia and myositis     Migraine     Chronic airway obstruction/ COPD     Mononeuritis     FAMILY HX-THROMBOSIS     Obstructive sleep apnea     Vitamin D deficiency     Hyperuricemia     Hypertension goal BP (blood pressure) < 130/80     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)     Hyperlipidemia with target LDL less than 70     Chronic diastolic heart failure (H)     Intermediate coronary syndrome (H)     Osteoarthritis     Morbid obesity (H)     Arthritis of knee     Constipation     Hypokalemia     Transient cerebral ischemia     Pain in joint of left  shoulder     History of thyroid cancer     Cervicalgia     Cholelithiasis     Pancreatitis, acute     Fatty liver disease, nonalcoholic     Hepatomegaly     Angina at rest (H)     S/P CABG x 3     Type 2 diabetes mellitus with stage 3 chronic kidney disease, with long-term current use of insulin (H)     Lymphedema     Syncope     Lower back pain     Falls     Thrombocytopenia (H)     Bilateral carotid artery disease (H)     Spinal stenosis of lumbar region, unspecified whether neurogenic claudication present     Cervical stenosis of spinal canal      Current Medications and Allergies:  See printed Medication Report.    Care Coordination Start Date: 11/29/2018   Frequency of Care Coordination: weekly   Form Last Updated: 02/18/2019

## 2019-02-18 NOTE — PROGRESS NOTES
Clinic Care Coordination Contact    Clinic Care Coordination Contact  OUTREACH    Referral Information:  Referral Source: ED Follow-Up    Primary Diagnosis: Injury/Fall(weakness low blood sugar )    Chief Complaint   Patient presents with     Clinic Care Coordination - Post Hospital     Clinic Care Coordination RN         Universal Utilization: ED visit 2/13/2019- Fall lightheadedness double vision headaches   Clinic Utilization  Difficulty keeping appointments:: No  Compliance Concerns: No  No PCP office visit in Past Year: No  Utilization    Last refreshed: 2/18/2019  9:56 AM:  Hospital Admissions 4           Last refreshed: 2/18/2019  9:56 AM:  ED Visits 3           Last refreshed: 2/18/2019  9:56 AM:  No Show Count (past year) 7              Current as of: 2/18/2019  9:56 AM              Clinical Concerns:  Current Medical Concerns:  Patient reports no change in the lightheadedness double vision or headaches.       Cardiac Rehabilitation stopped and will start outpatient PT/OT 3/6/2019.  Patient continues to use walker and cane and agrees to wear lifeline for safety .  Patient also has a communication system     Pain  Pain (GOAL):: Yes  Location of chronic pain:: Back Fibromyalgia  Progression: Unchanged  Health Maintenance Reviewed:    Clinical Pathway: None    Medication Management:  Reviewed at PCP visit      Functional Status:  Dependent IADLs:: Independent  Bed or wheelchair confined:: No    Living Situation:       Diet/Exercise/Sleep:  Food Insecurity: No  Tube Feeding: No  Exercise:: Unable to exercise  Inadequate activity/exercise (GOAL):: No  Significant changes in sleep pattern (GOAL): No    Transportation:  Transportation concerns (GOAL):: No  Transportation means:: Family     Psychosocial:        Financial/Insurance:   Financial/Insurance concerns (GOAL):: No     Supplies used at home:: Nebulizer tubing          Goals:   Goals        General    # 2 Pain Management (pt-stated)     Notes - Note edited   2/18/2019  1:58 PM by Senia Durán RN    Goal Statement: I will decrease back pain   Measure of Success: Increased mobility with daily activity   Supportive Steps to Achieve:   I will start taking Tylenol 100 mg 3 times a day per Dr William  I will take Lyrica as directed    I will use heat ice and rest   I will use my cane for safety  Barriers: Deconditioned   Strengths: Continues PT OT   Date to Achieve By: 3/28/2019  Patient expressed understanding of goal: yes        # 3 Medical (pt-stated)     Notes - Note created  2/18/2019  2:04 PM by Senia Durán RN    Goal Statement:I will prevent a future fall   Measure of Success: No falls  Supportive Steps to Achieve:  I will use cane or walker at all times   I will use lifeline when I go to the bathroom because walker doesn't fit   Barriers:Lightheaded /Dizzy leg weakness   Strengths: outpatient PT/OT 3/6  Date to Achieve By: 3/28/2019  Patient expressed understanding of goal: Yes        #1 Medical (pt-stated)     Notes - Note edited  2/18/2019  1:58 PM by Senia Durán RN    Goal Statement: I will monitor shortness of breath episodes   Measure of Success: More energy for daily activity   Supportive Steps to Achieve:     I will seek medical help if experiencing increased shortness of breath episodes   I will follow  COPD Action Plan   Barriers: None identified   Strengths: supportive room mate   Date to Achieve By: 3/28/2019  Patient expressed understanding of goal: Yes               Patient/Caregiver understanding: Expresses good understanding of discharge instructions     Outreach Frequency: weekly  Future Appointments              In 2 weeks Omar Greene, OT [facility] [place], [facility]    In 2 weeks Digna Hayden, PT [facility], [facility]    In 3 weeks  [facility]    In 3 weeks Stephanie Wolf MD [facility], [facility]    In 1 month Keven Jaeger Dev  MD [facility] Endocrinology, [facility]          Plan:  Patient will keep future OT/PT appointments  Patient will use cane walker and wear lifeline for safety  CC will follow up after OT/PT visit   Patient will seek medical help if increased lightheadedness,blurred vision or headache     Senia Durán RN / Clinical Care Coordinator     [facility]   [email or web address]  Office :  [phone] / Fax :  [phone]

## 2019-02-18 NOTE — PROGRESS NOTES
Clinic Care Coordination Contact  [facility]/Voicemail    Referral Source: ED Follow-Up  ED visit 2/13/2019- Fall lightheadedness double vision headaches   Clinical Data: Care Coordinator Outreach  Outreach attempted x 1.  Left message on voicemail with call back information and requested return call.  Plan: . Care Coordinator will try to reach patient again in 1-2 business days.  Senia Durán RN / Clinical Care Coordinator     [facility] [place]   [email or web address]  Office :  [phone] / Fax :  [phone]

## 2019-02-19 NOTE — PROGRESS NOTES
Cardiac Rehab Discharge Summary    Reason for discharge:    Change in medical status. Pt attended 13 cardiac rehab sessions over 2 months, she missed several sessions due to cold symp and transportation. She reported falling on 2/1 and getting a concussion, she was initially put on hold then d/cd per her request. It appears most appropriate that she follow with PT and OT for concussion and home safety.    Progress towards goals:  Pt initially tolerated <2 METs for intermittent bouts, progressed to tolerating 2.4METs for 20 min. She did participate in some indep ex. and in some of the ed.classes on lifestyle change.     Recommendation(s):    Continue home exercise program. Return to CR as appropriate.

## 2019-02-19 NOTE — ADDENDUM NOTE
Encounter addended by: Ena Huerta, OT on: 2/19/2019 4:15 PM   Actions taken: Sign clinical note, Episode resolved

## 2019-02-21 DIAGNOSIS — E11.42 TYPE 2 DIABETES MELLITUS WITH DIABETIC POLYNEUROPATHY, WITHOUT LONG-TERM CURRENT USE OF INSULIN (H): ICD-10-CM

## 2019-02-21 DIAGNOSIS — I50.32 CHRONIC DIASTOLIC HEART FAILURE (H): Chronic | ICD-10-CM

## 2019-02-21 NOTE — TELEPHONE ENCOUNTER
"Requested Prescriptions   Pending Prescriptions Disp Refills     blood glucose (NO BRAND SPECIFIED) test strip  Last Written Prescription Date:  18  Last Fill Quantity: 200 each,  # refills: 3   Last office visit: 2/15/2019 with prescribing provider:  Rafaela William   Future Office Visit:    200 each 3     Si strip by In Vitro route 2 times daily Strips per patient's preference    Diabetic Supplies Protocol Passed - 2019  3:48 PM       Passed - Medication is active on med list       Passed - Patient is 18 years of age or older       Passed - Recent (6 mo) or future (30 days) visit within the authorizing provider's specialty    Patient had office visit in the last 6 months or has a visit in the next 30 days with authorizing provider.  See \"Patient Info\" tab in inbasket, or \"Choose Columns\" in Meds & Orders section of the refill encounter.             "

## 2019-02-25 RX ORDER — LOSARTAN POTASSIUM 50 MG/1
50 TABLET ORAL DAILY
Qty: 90 TABLET | Refills: 3 | Status: SHIPPED | OUTPATIENT
Start: 2019-02-25 | End: 2019-03-13

## 2019-03-01 DIAGNOSIS — I50.32 CHRONIC DIASTOLIC HEART FAILURE (H): Chronic | ICD-10-CM

## 2019-03-01 DIAGNOSIS — I25.10 CORONARY ATHEROSCLEROSIS: Primary | Chronic | ICD-10-CM

## 2019-03-04 DIAGNOSIS — E78.5 HYPERLIPIDEMIA WITH TARGET LDL LESS THAN 70: ICD-10-CM

## 2019-03-04 DIAGNOSIS — G47.00 INSOMNIA, UNSPECIFIED TYPE: ICD-10-CM

## 2019-03-04 DIAGNOSIS — I50.32 CHRONIC DIASTOLIC HEART FAILURE (H): Chronic | ICD-10-CM

## 2019-03-04 RX ORDER — POTASSIUM CHLORIDE 750 MG/1
TABLET, EXTENDED RELEASE ORAL
Qty: 180 TABLET | Refills: 3 | Status: ON HOLD | OUTPATIENT
Start: 2019-03-04 | End: 2019-05-02

## 2019-03-05 RX ORDER — NIACIN 500 MG/1
TABLET, EXTENDED RELEASE ORAL
Qty: 180 TABLET | Refills: 9 | Status: SHIPPED | OUTPATIENT
Start: 2019-03-05 | End: 2019-03-11

## 2019-03-05 RX ORDER — TRAZODONE HYDROCHLORIDE 50 MG/1
TABLET, FILM COATED ORAL
Qty: 270 TABLET | Refills: 1 | Status: ON HOLD | OUTPATIENT
Start: 2019-03-05 | End: 2019-05-02

## 2019-03-06 ENCOUNTER — HOSPITAL ENCOUNTER (OUTPATIENT)
Dept: PHYSICAL THERAPY | Facility: CLINIC | Age: 74
Setting detail: THERAPIES SERIES
End: 2019-03-06
Attending: FAMILY MEDICINE
Payer: MEDICARE

## 2019-03-06 ENCOUNTER — HOSPITAL ENCOUNTER (OUTPATIENT)
Dept: OCCUPATIONAL THERAPY | Facility: CLINIC | Age: 74
Setting detail: THERAPIES SERIES
End: 2019-03-06
Attending: FAMILY MEDICINE
Payer: MEDICARE

## 2019-03-06 ENCOUNTER — TELEPHONE (OUTPATIENT)
Dept: FAMILY MEDICINE | Facility: CLINIC | Age: 74
End: 2019-03-06

## 2019-03-06 DIAGNOSIS — R42 DIZZINESS: Primary | ICD-10-CM
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
## 2019-03-06 DIAGNOSIS — M54.5 BILATERAL LOW BACK PAIN, UNSPECIFIED CHRONICITY, WITH SCIATICA PRESENCE UNSPECIFIED: ICD-10-CM

## 2019-03-06 DIAGNOSIS — I25.10 ATHEROSCLEROSIS OF NATIVE CORONARY ARTERY WITHOUT ANGINA PECTORIS, UNSPECIFIED WHETHER NATIVE OR TRANSPLANTED HEART: Chronic | ICD-10-CM

## 2019-03-06 DIAGNOSIS — M25.512 LEFT SHOULDER PAIN, UNSPECIFIED CHRONICITY: ICD-10-CM

## 2019-03-06 DIAGNOSIS — R29.6 FALLS FREQUENTLY: ICD-10-CM

## 2019-03-06 DIAGNOSIS — I50.32 CHRONIC DIASTOLIC HEART FAILURE (H): Chronic | ICD-10-CM

## 2019-03-06 DIAGNOSIS — F07.81 POST CONCUSSION SYNDROME: ICD-10-CM

## 2019-03-06 PROCEDURE — 97535 SELF CARE MNGMENT TRAINING: CPT | Mod: GO | Performed by: OCCUPATIONAL THERAPIST

## 2019-03-06 PROCEDURE — 97163 PT EVAL HIGH COMPLEX 45 MIN: CPT | Mod: GP | Performed by: PHYSICAL THERAPIST

## 2019-03-06 PROCEDURE — 97530 THERAPEUTIC ACTIVITIES: CPT | Mod: GP | Performed by: PHYSICAL THERAPIST

## 2019-03-06 PROCEDURE — 97166 OT EVAL MOD COMPLEX 45 MIN: CPT | Mod: GO | Performed by: OCCUPATIONAL THERAPIST

## 2019-03-06 NOTE — PROGRESS NOTES
[facility]        OUTPATIENT PHYSICAL THERAPY FUNCTIONAL EVALUATION  PLAN OF TREATMENT FOR OUTPATIENT REHABILITATION  (COMPLETE FOR INITIAL CLAIMS ONLY)  Patient's Last Name, First Name, M.I.  YOB: 1945  Mariel Ribeiro     Provider's Name   [facility]   Medical Record No.  [id number]     Start of Care Date:  03/06/19   Onset Date:  02/15/19   Type:     _X__PT   ____OT  ____SLP Medical Diagnosis:  Atherosclerosis of native coronary artery without angina pectoris, Chronic diastolic heart failure, Fall, B LBP with sciatica (unspecified side), Left shoulder pain     PT Diagnosis:  Gait instability Visits from SOC:  1                              __________________________________________________________________________________  Plan of Treatment/Functional Goals:  balance training, bed mobility training, gait training, motor coordination training, neuromuscular re-education, ROM, strengthening, stretching, transfer training, manual therapy, other (see comments)(Concussion, pain and falls risk management education)           GOALS  Falls risk/TUG  Pt will complete TUG with LR AD in <21 sec, without instability or c/o dizziness, to demonstrate reduced falls risk.  06/03/19    Gait speed/25ft walk  Pt will complete 25ft walk with LR AD in <15.5 sec, <18 steps, to demonstrate improved gait efficiency and reduced falls risk.  06/03/19    Falls prevention strategies  Pt to verbalize 3-5 strategies to reduce her falls risk with household and community mobility.  06/03/19    Neck pain management  Pt will verbalize 3-5 strategies to self-manage neck pain.  06/03/19    CSA  Pt to demonstrate improved concussion related symptoms to better tolerate desired tasks including ADLs, household mobility, and water color painting, via CSA rating <30/90.  06/03/19                                      Therapy Frequency:  1 time/week   Predicted Duration of Therapy Intervention:  90 days    Digna Hayden, PT                                    I CERTIFY THE NEED FOR THESE SERVICES FURNISHED UNDER        THIS PLAN OF TREATMENT AND WHILE UNDER MY CARE     (Physician co-signature of this document indicates review and certification of the therapy plan).                Certification Date From:  03/06/19   Certification Date To:  06/03/19    Referring Provider:  Rafaela William MD    Initial Assessment  See Epic Evaluation- Start of Care Date: 03/06/19

## 2019-03-06 NOTE — PROGRESS NOTES
03/06/19 1403   Quick Adds   Quick Adds Certification   Type of Visit Initial OP PT Evaluation   General Information   Start of Care Date 03/06/19   Referring Physician Rafaela William MD   Orders Evaluate and Treat as Indicated   Additional Orders Concussion, heart health, gait, balance, falls   Order Date 02/15/19   Medical Diagnosis Atherosclerosis of native coronary artery without angina pectoris, Chronic diastolic heart failure, Diabetes with peripheral neuropathy, Fall, B LBP with sciatica (unspecified side), Left shoulder pain   Onset of illness/injury or Date of Surgery 02/15/19   Surgical/Medical history reviewed Yes   Pertinent history of current problem (include personal factors and/or comorbidities that impact the POC) Pt with 2 recent falls 10 days apart, unclear reason for fall, may have had syncope but pt is unsure. Pt dx atherosclerosis of coronary arteries, chronic diastolis heart failure, s/p CABG x3 July 2, 2018, B LBP with sciatica, L shoulder pain, neck pain, B LE lymphedema.    Pertinent Visual History  Double vision, pretty much all the time, since 2nd fall. Used to be able to blink until it came together.    Diagnostic Tests CT Scan   CT Results unremarkable   Current Community Support Transportation service;Housekeeping service;Family/friend caregiver  (roommate does laundry (pt folds))   Patient role/Employment history Retired;Disabled  (Water , unable dt dbl vision)   [facility]  (Roommate Odalis.)   Home/Community Accessibility Comments 2 stairs with B rails, doesn't go down to basement   Current Assistive Devices Standard Cane;Four Wheeled Walker   ADL Devices Shower/Tub Chair;Wall Grab Bar  (tub/shower combo)   Patient/Family Goals Statement Figure out what triggers my falls.    General Information Comments I've had trouble with balance since my surgery last year in July 2nd, hosp 4 weeks, rehab 6 weeks, TCU 6 weeks, home care 10 wks or so, then  cardiac rehab. I also have a bad hip and shoulder on L, low back pain. I'm not sure what are triggers of my falls. H/o B knee replacements. Blood sugar has trended low at start of cardiac rehab sessions, leading to limited time d/t needing to eat/drink something to bring it up >100 to do exercise. Sometimes I just sit down if I get dizzy and it passes, other times I pass out just like that (snaps). Has had multiple falls including 2 recent falls where she doesn't know if she's had LOC, just next thing she knows shes down. Notes it's become routine that she'll get up to restroom around 4am and her roommate will find her asleep on the toilet leaning against the wall around 5am. States sometimes it's tough to get up so she rests and must fall asleep. It's a tall toilet and has places to pull up from - ext tub bench handle is right there, vanity, window if needed. 4ww doesn't fit into bathroom. Here today with her cane, has ice  attached at tip.    Fall Risk Screen   Fall screen completed by PT   Have you fallen 2 or more times in the past year? Yes   Have you fallen and had an injury in the past year? Yes   Timed Up and Go score (seconds) 26.5  (cane)   Is patient a fall risk? Yes   System Outcome Measures   Outcome Measures Concussion (see Concussion Symptom Assessment)  (52/90)   Pain   Patient currently in pain Yes   Pain location L hip   Pain rating 3-4/10   Additional pain locations? Pain location 2   Pain location 2 Neck   Pain rating 2 4/10, up to 10/10 with AROM   Cognitive Status Examination   Orientation orientation to person, place and time   Level of Consciousness alert;lethargic/somnolent  (alert initially, but faded to lethargic, dozed off x1)   Follows Commands and Answers Questions 75% of the time   Personal Safety and Judgment intact   Memory impaired   Cognitive Comment See OT note for further screening   Integumentary   Integumentary Comments Pt wearing velcro edema wraps B LEs to knee height-  "reports wears them day/night 6 days per week and takes one day off \"to see if they swell\" and they do swell. Skin not visualized.    Posture   Posture Forward head position;Protracted shoulders;Kyphosis   Neck Extension ROM   Neck Extension AROM - degrees 60*   Left Neck Rotation ROM   Left Neck Rotation AROM - degrees 55*  (pain 9/10, dizziness)   Right Neck Rotation ROM   Right Neck Rotation AROM - degrees 40*  (pain 10/10, dizziness)   Strength   Strength Comments Hip flexion 4/5 with early give R, 4+/5 L, hip abd/add 5/5 B, knee ext 5/5 B, knee flex 5/5 B, ankle DF 5/5 B   Bed Mobility   Bed Mobility Comments NT this session   Transfer Skills   Transfer Comments Needs to use UEs to rise from 17\" chair, used wider chair for comfort   Gait   Gait Comments Pt amb with cane with slow pace, shortened step length, reduced stefania, reduced L stance time, increased time in double stance.    Gait Special Tests   Gait Special Tests 25 FOOT TIMED WALK   Gait Special Tests 25 Foot Timed Walk   Seconds 19.63    Steps 21 Steps   Comments cane   Balance   Balance Comments Note increased instability with turns, and head turns.   Balance Special Tests   Balance Special Tests Romberg   Balance Special Tests Romberg   Seconds 0 Seconds   Comments 4 sec with feet narrow and EO   Sensory Examination   Sensory Perception Comments Pt notes could not feel pressure of MMT of DF on dorsal foot, neuropathy affects toes to heel B feet, not above ankle per report. Did not have pt remove velcro edema wraps for testing.    Coordination   Coordination Comments Significant difficulty coordination head/eye movements during VOMS.    Muscle Tone   Muscle Tone no deficits were identified   Cervicogenic Screen   Neck ROM see above   Vertebral Artery Test Abnormal   Vertebral Artery Test Comments Tested seated, onset of dizziness, feeling of weakness and feeling as though she may pass out.    Oculomotor Exam   Smooth Pursuit Normal   Smooth Pursuit " Comment also see VOMS, separate note   Saccades Abnormal   VOR Abnormal   VOR Comments Very difficult for pt to focus gaze and coordinate head movement, very foggy/detached from actvity quickly   VOR Cancellation (Unable to complete)   Convergence Testing Abnormal   Convergence Testing Comments Needed to use larger font on target. Dozed off 2nd rep, eyes very fatigued.   Planned Therapy Interventions   Planned Therapy Interventions balance training;bed mobility training;gait training;motor coordination training;neuromuscular re-education;ROM;strengthening;stretching;transfer training;manual therapy;other (see comments)  (Concussion, pain and falls risk management education)   Clinical Impression   Criteria for Skilled Therapeutic Interventions Met yes, treatment indicated   PT Diagnosis Gait instability   Influenced by the following impairments Functional strength, sensation, edema, balance, coordination, vision,    Functional limitations due to impairments High falls risk, impaired transfers, gait, standing tolerance, ADLs   Clinical Presentation Unstable/Unpredictable   Clinical Presentation Rationale Pt with signs of Vertebrobasilar insufficiency, recent and remote history of falls with unclear etiology and potential syncope as cause. Pt has complex medical history, with recent falls leading to concussion-like symptoms. Pt is at high falls risk. Pt's treatment course is unpredictable.   Clinical Decision Making (Complexity) High complexity   Therapy Frequency 1 time/week   Predicted Duration of Therapy Intervention (days/wks) 90 days   Risk & Benefits of therapy have been explained Yes   Patient, Family & other staff in agreement with plan of care Yes   GOALS   PT Eval Goals 1;2;3;4;5   Goal 1   Goal Identifier Falls risk/TUG   Goal Description Pt will complete TUG with LR AD in <21 sec, without instability or c/o dizziness, to demonstrate reduced falls risk.   Target Date 06/03/19   Goal 2   Goal Identifier Gait  speed/25ft walk   Goal Description Pt will complete 25ft walk with LR AD in <15.5 sec, <18 steps, to demonstrate improved gait efficiency and reduced falls risk.   Target Date 06/03/19   Goal 3   Goal Identifier Falls prevention strategies   Goal Description Pt to verbalize 3-5 strategies to reduce her falls risk with household and community mobility.   Target Date 06/03/19   Goal 4   Goal Identifier Neck pain management   Goal Description Pt will verbalize 3-5 strategies to self-manage neck pain.   Target Date 06/03/19   Goal 5   Goal Identifier CSA   Goal Description Pt to demonstrate improved concussion related symptoms to better tolerate desired tasks including ADLs, household mobility, and water color painting, via CSA rating <30/90.   Target Date 06/03/19   Total Evaluation Time   PT Eval, High Complexity Minutes (72745) 60   Therapy Certification   Certification date from 03/06/19   Certification date to 06/03/19   Medical Diagnosis Atherosclerosis of native coronary artery without angina pectoris, Chronic diastolic heart failure, Fall, B LBP with sciatica (unspecified side), Left shoulder pain     Na Hayden, PT, DPT  Physical Therapist  [facility]  Outpatient Rehabilitation Services[address]  [address]  [place] [address]  [email or web address]  Office: [phone]  Pager: [phone]  Fax: [phone]

## 2019-03-06 NOTE — PROGRESS NOTES
Vestibular/Ocular Motor Test: 3/6/19     Not Tested Headache Dizziness Nausea Fogginess Comments   Baseline N/A 6 0 0 2    Smooth Pursuits  1 2 1 2 normal   Saccades-Horizontal  3 3 2 3-4 Eyes really tired   Saccades-Vertical  6 5 3 6 L eye working harder, rubs eyes   Convergence ([place])  3-4 4-5 6 6 ([place] in CM)  *USED LARGE FONT  Measure 1: 39  Measure 2: 45  Measure 3: 39   VOR Horizontal  9 10 9 9 Couldn't focus on target or coordinate head movement   VOR Vertical NT        Visual Motion Sensitivity Test NT

## 2019-03-07 NOTE — TELEPHONE ENCOUNTER
----- Message from Digna Hayden PT sent at 3/6/2019  4:54 PM CST -----  Thank you for referring Mariel to [facility] for PT/OT services. 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 This may be strongly contributing to her falls/ losses of consciousness. I wanted to ensure you were aware for any further workup/treatment she should pursue. Thank you!    Na Hayden, PT, DPT  Physical Therapist  [facility]  Outpatient Rehabilitation Services, [address]  [place] [address]  [email or web address]  Office: [phone]  Pager: [phone]  Fax: [phone]

## 2019-03-07 NOTE — TELEPHONE ENCOUNTER
Please ask the patient to schedule a visit with neurology.  If she does not have a neurologist (I don't believe she does), I have placed a referral.  Thank you.

## 2019-03-07 NOTE — TELEPHONE ENCOUNTER
Writer called patient left non detailed message requesting return call to clinic and ask to speak with nurse    Js Nagel RN

## 2019-03-11 ENCOUNTER — OFFICE VISIT (OUTPATIENT)
Dept: FAMILY MEDICINE | Facility: CLINIC | Age: 74
End: 2019-03-11
Payer: MEDICARE

## 2019-03-11 DIAGNOSIS — J06.9 UPPER RESPIRATORY TRACT INFECTION, UNSPECIFIED TYPE: ICD-10-CM

## 2019-03-11 DIAGNOSIS — J98.01 ACUTE BRONCHOSPASM: Primary | ICD-10-CM

## 2019-03-11 PROCEDURE — 99213 OFFICE O/P EST LOW 20 MIN: CPT | Performed by: FAMILY MEDICINE

## 2019-03-11 RX ORDER — PREDNISONE 20 MG/1
TABLET ORAL
Qty: 15 TABLET | Refills: 0 | Status: SHIPPED | OUTPATIENT
Start: 2019-03-11 | End: 2019-04-25

## 2019-03-11 NOTE — TELEPHONE ENCOUNTER
"S-(situation):   1. RN called patient to give message from Dr. William - to schedule with neurology - referral information given to patient   2. \" While I have you on the phone - I was planning on calling in a little bit\"    B-(background): patient states she has had a cough for 3 days and would like antibiotics/prednisone called in   History of COPD     A-(assessment): cough x 3 days, non productive   Emesis of mostly mucous last night   No fever   Wheezing present and heard by RN on phone   Patient is using albuterol inhaler 4 times per day     R-(recommendations): office visit recommended   Patient states she is dealing with a concussion and unable to drive right now - she would like a request sent to provider for medications with out an office visit     RN will route and patient will be called back with recommendations from covering provider - advised pcp is out this week     Bhavin Qureshi Nurse   [facility]         "

## 2019-03-11 NOTE — PROGRESS NOTES
SUBJECTIVE:   Mariel Ribeiro is a 73 year old female who presents to clinic today for the following health issues:      HPI     CC: Wheezing  Presents with increased sensation fo SOB and wheezing and worsening cough over the weekend.  No fever but some chills.  Did have episode of diarrhea on Friday.  Dry cough.    Problem list and histories reviewed & adjusted, as indicated.  Additional history: as documented        Patient Active Problem List   Diagnosis     Hypothyroidism      HX PHYSICAL ABUSE     Coronary atherosclerosis     Myalgia and myositis     Migraine     Chronic airway obstruction/ COPD     Mononeuritis     FAMILY HX-THROMBOSIS     Obstructive sleep apnea     Vitamin D deficiency     Hyperuricemia     Hypertension goal BP (blood pressure) < 130/80     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)     Hyperlipidemia with target LDL less than 70     Chronic diastolic heart failure (H)     Intermediate coronary syndrome (H)     Osteoarthritis     Morbid obesity (H)     Arthritis of knee     Constipation     Hypokalemia     Transient cerebral ischemia     Pain in joint of left shoulder     History of thyroid cancer     Cervicalgia     Cholelithiasis     Pancreatitis, acute     Fatty liver disease, nonalcoholic     Hepatomegaly     Angina at rest (H)     S/P CABG x 3     Type 2 diabetes mellitus with stage 3 chronic kidney disease, with long-term current use of insulin (H)     Lymphedema     Syncope     Lower back pain     Falls     Thrombocytopenia (H)     Bilateral carotid artery disease (H)     Spinal stenosis of lumbar region, unspecified whether neurogenic claudication present     Cervical stenosis of spinal canal     Past Surgical History:   Procedure Laterality Date     ANGIOGRAPHY  8/11    with stent placement X2 mid- and proximal LAD     ARTHROPLASTY KNEE  1/28/2014    Procedure: ARTHROPLASTY KNEE;  ARTHROPLASTY KNEE -RIGHT TOTAL  ;  Surgeon: Victor Manuel Wolff MD;  Location: [facility] OR     BYPASS GRAFT  ARTERY CORONARY N/A 7/2/2018    Procedure: BYPASS GRAFT ARTERY CORONARY;  Median Sternotomy, On Cardiopulmonary Bypass Pump, Coronary Artery Bypass Graft x 3, using Left Internal Mammary and Endoscopic Vein [place] on Bilateral Saphenous Vein, Transesophageal Echocardiogram, Epi-aortic Ultrasound;  Surgeon: Joao Mason MD;  Location: [facility] OR     C TOTAL KNEE ARTHROPLASTY  7/30/04    Knee Replacement, Total right and left     CATARACT IOL, RT/LT Bilateral      COLONOSCOPY       DILATION AND CURETTAGE, OPERATIVE HYSTEROSCOPY WITH MORCELLATOR, COMBINED N/A 11/1/2016    Procedure: COMBINED DILATION AND CURETTAGE, OPERATIVE HYSTEROSCOPY WITH MORCELLATOR;  Surgeon: Stacey Arnold, ;  Location: [facility] INTRODUCE NEEDLE/CATH, EXTREMITY ARTERY  1999,2002,2004    Angiocardiogram     HC KNEE SCOPE, DIAGNOSTIC  1990's    Arthroscopy, Knee, bilateral     LAPAROSCOPIC CHOLECYSTECTOMY N/A 8/11/2017    Procedure: LAPAROSCOPIC CHOLECYSTECTOMY;  Laparoscopic Cholecystectomy   *Latex Allergy*, Anesthesia Block;  Surgeon: Jeffrey Roberson MD;  Location: [facility] OR     PHACOEMULSIFICATION CLEAR CORNEA WITH STANDARD INTRAOCULAR LENS IMPLANT Right 12/29/2014    Procedure: PHACOEMULSIFICATION CLEAR CORNEA WITH STANDARD INTRAOCULAR LENS IMPLANT;  Surgeon: Smith Quintana MD;  Location: [facility]     PHACOEMULSIFICATION CLEAR CORNEA WITH TORIC INTRAOCULAR LENS IMPLANT Left 1/12/2015    Procedure: PHACOEMULSIFICATION CLEAR CORNEA WITH TORIC INTRAOCULAR LENS IMPLANT;  Surgeon: Smith Quintana MD;  Location: [facility]     SURGICAL HISTORY OF -   1963    dentures     SURGICAL HISTORY OF -   1985    thyroidectomy     SURGICAL HISTORY OF -   1998    right thumb surgery     SURGICAL HISTORY OF -   2001    right breast biopsy (benign)     SURGICAL HISTORY OF -   04/2004    left shoulder surgery - rotator cuff     SURGICAL HISTORY OF -   4/09    left thumb surgery     THYROIDECTOMY         Social History     Tobacco Use      Smoking status: Former Smoker     Packs/day: 0.00     Years: 27.00     Pack years: 0.00     Types: Cigarettes     Last attempt to quit: 1989     Years since quittin.7     Smokeless tobacco: Never Used   Substance Use Topics     Alcohol use: No     Alcohol/week: 0.0 oz     Family History   Problem Relation Age of Onset     C.A.D. Mother      Diabetes Mother      Hypertension Mother      Blood Disease Mother         multiple episodes of thrombosis     Circulatory Mother         DVT X 2; ocular clot; cerebral; carotid artery stenosis     Glaucoma Mother      Macular Degeneration Mother      C.A.D. Father      Hypertension Father      Cerebrovascular Disease Father      Alcohol/Drug Father         etoh     Cancer Brother         liver,pancreas, brain     Cardiovascular Sister      Hypertension Sister      Hypertension Brother      Alcohol/Drug Sister         etoh     Alcohol/Drug Brother         drug     Diabetes Sister         younger     C.A.D. Sister         CABG age 65     C.A.D. Brother         CABG age 42     C.A.D. Sister         stents age 58     C.A.D. Brother      Genitourinary Problems Sister         kidney disease         Current Outpatient Medications   Medication Sig Dispense Refill     acetaminophen (TYLENOL) 325 MG tablet Take 2 tablets (650 mg) by mouth every 4 hours as needed for mild pain 100 tablet      albuterol (PROAIR HFA) 108 (90 Base) MCG/ACT inhaler Inhale 1-2 puffs into the lungs every 4 hours as needed for wheezing 1 Inhaler PRN     aspirin  MG tablet Take 1 tablet by mouth daily. 30 tablet 0     atorvastatin (LIPITOR) 40 MG tablet Take 1 tablet (40 mg) by mouth daily 30 tablet 0     bumetanide (BUMEX) 2 MG tablet Take 1 tablet (2 mg) by mouth every 48 hours May take 1mg extra as directed by [facility]. 45 tablet 3     EPINEPHrine (EPIPEN/ADRENACLICK/OR ANY BX GENERIC EQUIV) 0.3 MG/0.3ML injection 2-pack Inject 0.3 mLs (0.3 mg) into the muscle once as needed for  anaphylaxis 0.6 mL 0     escitalopram (LEXAPRO) 20 MG tablet Take 1 tablet (20 mg) by mouth every morning 30 tablet 0     ferrous gluconate (FERGON) 324 (38 Fe) MG tablet Take 1 tablet (324 mg) by mouth Every Mon, Wed, Fri Morning 36 tablet 3     FOLIC ACID PO Take 1 mg by mouth daily        guaiFENesin (MUCINEX) 600 MG 12 hr tablet Take 1 tablet (600 mg) by mouth 2 times daily       insulin isophane human (HUMULIN N KWIKPEN) 100 UNIT/ML injection Inject 28 units subcutaneous each am and each pm. On rehab days, 8 units in NPH and 28 units in the PM 15 mL 3     levothyroxine (SYNTHROID/LEVOTHROID) 150 MCG tablet Take 1 tablet (150 mcg) by mouth daily 30 tablet 0     TripIt FINEPOINT LANCETS MISC Use to test blood sugars 2 times daily or as directed. 100 each prn     lifitegrast (XIIDRA) 5 % opthalmic solution Place 1 drop into both eyes 2 times daily 1 each 11     LYRICA 100 MG capsule TAKE 1 CAPSULE BY MOUTH TWICE DAILY 180 capsule 0     Metoprolol Tartrate 75 MG TABS Take 75 mg by mouth 2 times daily 180 tablet 3     niacin (NIASPAN) 500 MG CR tablet Take 500 mg by mouth daily   9     predniSONE (DELTASONE) 20 MG tablet Take 3 tablets once daily x 5 days. 15 tablet 0     blood glucose (NO BRAND SPECIFIED) test strip 1 strip by In Vitro route 4 times daily Strips per patient's preference 400 each 1     blood glucose monitoring (NO BRAND SPECIFIED) meter device kit Use to test blood sugar four times daily or as directed. 1 kit 0     losartan (COZAAR) 50 MG tablet Take 1 tablet (50 mg) by mouth every morning AND 0.5 tablets (25 mg) every evening. 135 tablet 3     nitroGLYcerin (NITROSTAT) 0.4 MG sublingual tablet For chest pain place 1 tablet under the tongue every 5 minutes for 3 doses. If symptoms persist 5 minutes after 1st dose call 911. 25 tablet 0     ONE TOUCH ULTRA (DEVICES) MISC test blood sugar BID 1 0     potassium chloride ER (K-DUR/KLOR-CON M) 10 MEQ CR tablet Take 10 mEq Tuesday, Thurs, Sat, Sun. Take  20 MeQ Monday, Wednesday, Friday. 180 tablet 3     repaglinide (PRANDIN) 1 MG tablet Take 1 mg with meals.  Hold Prandin in the am and at noon on days you have rehab. 90 tablet 3     senna-docusate (SENOKOT-S;PERICOLACE) 8.6-50 MG per tablet Take 1-2 tablets by mouth 2 times daily as needed for constipation 30 tablet 0     traZODone (DESYREL) 50 MG tablet TAKE 3 TABLETS(150 MG) BY MOUTH AT BEDTIME 270 tablet 1     VITAMIN D, CHOLECALCIFEROL, PO Take 10,000 Units by mouth daily       Allergies   Allergen Reactions     Contrast Dye Anaphylaxis     Fish Allergy Anaphylaxis     Iodine Anaphylaxis     Oxycodone Other (See Comments)     Severe suicidal tendencies on this medication     Tree Nuts [Nuts] Anaphylaxis     Tree nuts only (peanuts ok)     Albuterol Other (See Comments)     Sandrita-oral erythema  Confirmed 7/3/18 that patient uses albuterol inhalers at home. Patient had her home inhaler in her bag.     Bactrim      Increased uric acid     Betadine [Povidone Iodine] Hives, Swelling and Difficulty breathing     Betadine     Combivent      Rash     Dulaglutide Other (See Comments)     Hx . Of thyroid cancer.     Fish      Lisinopril Other (See Comments)     Scr/grf severely reduced.      Penicillins Rash     Allopurinol Rash     Latex Rash     Wool Fiber Rash     Recent Labs   Lab Test 03/13/19  1508 02/13/19  1449  12/24/18  1610  09/10/18  0614  08/28/18  1427  07/27/18  1427  07/01/18  0621  03/19/18  1404  03/08/17  1420   A1C  --   --   --   --   --  5.2  --   --   --   --   --  9.3*  --  8.2*   < > 7.2*   LDL 76  --   --   --   --   --   --   --   --   --   --   --   --  111*  --  89   HDL 71  --   --   --   --   --   --   --   --   --   --   --   --  50  --  50   TRIG 118  --   --   --   --   --   --   --   --   --   --   --   --  212*  --  237*   ALT  --  19  --  49  --   --   --   --   --  44  --  26   < > 24   < >  --    CR 1.51* 1.42*  --  1.19*   < > 1.48*   < > 1.41*   < > 1.38*   < >  --    < > 1.28*   <  > 1.19*   GFRESTIMATED 34* 37*  --  45*   < > 35*   < > 37*   < > 38*   < >  --    < > 41*   < > 45*   GFRESTBLACK 39* 42*  --  53*   < > 42*   < > 44*   < > 45*   < >  --    < > 50*   < > 54*   POTASSIUM 4.6 4.0   < > 4.5   < > 3.9   < > 4.0   < > 3.7   < >  --    < > 3.6   < > 3.8   TSH  --   --   --   --   --   --   --  2.97  --   --   --   --   --  1.15   < > 0.12*    < > = values in this interval not displayed.      BP Readings from Last 3 Encounters:   03/13/19 171/68   03/11/19 140/75   02/15/19 119/59    Wt Readings from Last 3 Encounters:   03/13/19 146.1 kg (322 lb)   02/13/19 141.5 kg (312 lb)   02/04/19 (P) 141.9 kg (312 lb 12.8 oz)          ROS:  Constitutional, HEENT, cardiovascular, pulmonary, gi and gu systems are negative, except as otherwise noted.    OBJECTIVE:     /75   Pulse 85   Resp 16   SpO2 96%   There is no height or weight on file to calculate BMI.  GENERAL: healthy, alert and no distress  EYES: Eyes grossly normal to inspection, PERRL and conjunctivae and sclerae normal  NECK: no adenopathy, no asymmetry, masses, or scars and thyroid normal to palpation  RESP: lungs clear to auscultation - no rales or rhonchi, few scattered wheezes at bases bilaterally  CV: regular rate and rhythm, normal S1 S2, no S3 or S4, no murmur, click or rub, no peripheral edema and peripheral pulses strong  ABDOMEN: soft, nontender, no hepatosplenomegaly, no masses and bowel sounds normal  MS: no gross musculoskeletal defects noted, no edema  PSYCH: mentation appears normal, affect normal/bright    ASSESSMENT/PLAN:     1. Acute bronchospasm  2. URI    - predniSONE (DELTASONE) 20 MG tablet; Take 3 tablets once daily x 5 days.  Dispense: 15 tablet; Refill: 0    Patient states a prednisone works best when she has an exacerbation.  Prescribed burst of prednisone 60mg once daily x 5 days with close Follow-up at end of week if no change or worsening symptoms prn.  Continue with albuterol prn  wheezes.      Amee Connell MD  [facility]

## 2019-03-11 NOTE — TELEPHONE ENCOUNTER
Patient states computer not working.  Made Appt at 3 pm for today. She states she can get a cab and this will work for her.   Monica Gonzalez RN

## 2019-03-13 ENCOUNTER — OFFICE VISIT (OUTPATIENT)
Dept: CARDIOLOGY | Facility: CLINIC | Age: 74
End: 2019-03-13
Attending: INTERNAL MEDICINE
Payer: MEDICARE

## 2019-03-13 VITALS
SYSTOLIC BLOOD PRESSURE: 171 MMHG | OXYGEN SATURATION: 96 % | BODY MASS INDEX: 47.09 KG/M2 | HEART RATE: 64 BPM | DIASTOLIC BLOOD PRESSURE: 68 MMHG | HEIGHT: 66 IN | WEIGHT: 293 LBS

## 2019-03-13 VITALS
SYSTOLIC BLOOD PRESSURE: 140 MMHG | OXYGEN SATURATION: 96 % | HEART RATE: 85 BPM | DIASTOLIC BLOOD PRESSURE: 75 MMHG | RESPIRATION RATE: 16 BRPM

## 2019-03-13 DIAGNOSIS — I25.10 CORONARY ATHEROSCLEROSIS: Chronic | ICD-10-CM

## 2019-03-13 DIAGNOSIS — I50.32 CHRONIC DIASTOLIC HEART FAILURE (H): Chronic | ICD-10-CM

## 2019-03-13 LAB
ANION GAP SERPL CALCULATED.3IONS-SCNC: 4 MMOL/L (ref 3–14)
BUN SERPL-MCNC: 34 MG/DL (ref 7–30)
CALCIUM SERPL-MCNC: 9.4 MG/DL (ref 8.5–10.1)
CHLORIDE SERPL-SCNC: 107 MMOL/L (ref 94–109)
CHOLEST SERPL-MCNC: 170 MG/DL
CO2 SERPL-SCNC: 29 MMOL/L (ref 20–32)
CREAT SERPL-MCNC: 1.51 MG/DL (ref 0.52–1.04)
GFR SERPL CREATININE-BSD FRML MDRD: 34 ML/MIN/{1.73_M2}
GLUCOSE SERPL-MCNC: 134 MG/DL (ref 70–99)
HDLC SERPL-MCNC: 71 MG/DL
LDLC SERPL CALC-MCNC: 76 MG/DL
NONHDLC SERPL-MCNC: 99 MG/DL
POTASSIUM SERPL-SCNC: 4.6 MMOL/L (ref 3.4–5.3)
SODIUM SERPL-SCNC: 140 MMOL/L (ref 133–144)
TRIGL SERPL-MCNC: 118 MG/DL

## 2019-03-13 PROCEDURE — 80061 LIPID PANEL: CPT | Performed by: INTERNAL MEDICINE

## 2019-03-13 PROCEDURE — 99214 OFFICE O/P EST MOD 30 MIN: CPT | Mod: ZP | Performed by: INTERNAL MEDICINE

## 2019-03-13 PROCEDURE — 36415 COLL VENOUS BLD VENIPUNCTURE: CPT | Performed by: INTERNAL MEDICINE

## 2019-03-13 PROCEDURE — 80048 BASIC METABOLIC PNL TOTAL CA: CPT | Performed by: INTERNAL MEDICINE

## 2019-03-13 PROCEDURE — G0463 HOSPITAL OUTPT CLINIC VISIT: HCPCS | Mod: ZF

## 2019-03-13 RX ORDER — LOSARTAN POTASSIUM 50 MG/1
TABLET ORAL
Qty: 135 TABLET | Refills: 3 | Status: ON HOLD | OUTPATIENT
Start: 2019-03-13 | End: 2019-05-02

## 2019-03-13 ASSESSMENT — PAIN SCALES - GENERAL: PAINLEVEL: NO PAIN (0)

## 2019-03-13 ASSESSMENT — MIFFLIN-ST. JEOR: SCORE: 1987.33

## 2019-03-13 NOTE — PROGRESS NOTES
[facility]          OUTPATIENT OCCUPATIONAL THERAPY  EVALUATION  PLAN OF TREATMENT FOR OUTPATIENT REHABILITATION  (COMPLETE FOR INITIAL CLAIMS ONLY)  Patient's Last Name, First Name, M.I.  YOB: 1945  Mariel Ribeiro                        Provider's Name  [facility] Medical Record No.  [id number]                               Onset Date:     02/03/19   Start of Care Date:     03/06/19   Type:     ___PT   _X_OT   ___SLP Medical Diagnosis:     atherosclerosis, heart failure, fall , low back pain,, L shoulder pain                          OT Diagnosis:     impaired ADL and IADL Visits from SOC:  1   _________________________________________________________________________________  Plan of Treatment/Functional Goals:  ADL training, IADL training, Cognitive performance testing, Cognitive skills, Coordination training, Self care/Home management, Strengthening, Therapeutic activities, Visual perception      Goals     Goal Description: Pt will identify at least 3 compensation methods for reduced vision to aid in functional visual tasks such as reading, viewing her phone, etc  Target Date: 05/29/19        Goal Description: Pt will demonstrate functional attentional skills demonstrating understanding of at least 2 compensation techniques she is using at home with ADL/IADL tasks  as measured by  safe  reaction time scores on the [employer] scanboard as precursor to readiness for return to driving  Target Date: 05/29/19        Goal Description: Pt will demonstrate at least 2 methods to compensate for memory and functional recall of at least 90% of information in therapy sessions and from session to session as needed for safe IADL adn report return to 80% of her baseline IADL performance using fall prevention techniques.  Target Date: 05/29/19            Therapy Frequency: 1x/wk and  tapering for up to 8 visits     Predicted Duration of Therapy Intervention (days/wks): 12 weeks 3/6/19-5/29/19    Omar Greene, OTR/L, MSCS          I CERTIFY THE NEED FOR THESE SERVICES FURNISHED UNDER        THIS PLAN OF TREATMENT AND WHILE UNDER MY CARE     (Physician co-signature of this document indicates review and certification of the therapy plan).                 Referring Physician: Dr.Angela William     Initial Assessment        See Epic Evaluation      Start Of Care Date: 03/06/19

## 2019-03-13 NOTE — NURSING NOTE
Chief Complaint   Patient presents with     Follow Up     Chronic diastolic heart failure     Vitals were taken and medications were reconciled.     Dayanara Thomas RMA  3:23 PM
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
## 2019-03-13 NOTE — ADDENDUM NOTE
Encounter addended by: Omar Greene OT on: 3/13/2019 10:13 AM   Actions taken: Document created, Document edited, Sign clinical note, Flowsheet accepted

## 2019-03-13 NOTE — ADDENDUM NOTE
Encounter addended by: Omar Greene, OT on: 3/12/2019 9:23 PM   Actions taken: Flowsheet data copied forward, Flowsheet accepted
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
## 2019-03-13 NOTE — PATIENT INSTRUCTIONS
Cardiology Providers you saw during your visit:  Dr. Wolf     Medication changes:  1- Increase losartan to 50mg in the morning and 25mg in the evening.  2 - Take your bumex 2mg this week:  Thursday Friday Saturday Sunday  Check your weight on Monday -- if you are down 10lb, you can go back to your every other day dosing. If not, continue your every day dosing until the weight has come off.    *Please have your labs drawn around March 22nd.          Follow up:  1- We will work on getting you approved for a CardioMEMS device. We will contact you once it has been approved by insurance and will set up an implant date at that time.   2 - follow up with endocrine regarding your elevated blood pressures on the prednisone.  3 - Follow up in [facility] in about 6 weeks.   4 - Follow up with Dr. Wolf in 3 months.     Results for ROSY PALMER (MRN [id number]) as of 3/13/2019 15:59   Ref. Range 3/13/2019 15:08   Sodium Latest Ref Range: 133 - 144 mmol/L 140   Potassium Latest Ref Range: 3.4 - 5.3 mmol/L 4.6   Chloride Latest Ref Range: 94 - 109 mmol/L 107   Carbon Dioxide Latest Ref Range: 20 - 32 mmol/L 29   Urea Nitrogen Latest Ref Range: 7 - 30 mg/dL 34 (H)   Creatinine Latest Ref Range: 0.52 - 1.04 mg/dL 1.51 (H)   GFR Estimate Latest Ref Range: >60 mL/min/1.73_m2 34 (L)   GFR Estimate If Black Latest Ref Range: >60 mL/min/1.73_m2 39 (L)   Calcium Latest Ref Range: 8.5 - 10.1 mg/dL 9.4   Anion Gap Latest Ref Range: 3 - 14 mmol/L 4   Cholesterol Latest Ref Range: <200 mg/dL 170   HDL Cholesterol Latest Ref Range: >49 mg/dL 71   LDL Cholesterol Calculated Latest Ref Range: <100 mg/dL 76   Non HDL Cholesterol Latest Ref Range: <130 mg/dL 99   Triglycerides Latest Ref Range: <150 mg/dL 118   Glucose Latest Ref Range: 70 - 99 mg/dL 134 (H)       Please call if you have:  1. Weight gain of more than 2 pounds in a day or 5 pounds in a week  2. Increased shortness of breath, swelling or bloating  3. Dizziness,  lightheadedness   4. Any questions or concerns.      Follow the American Heart Association Diet and Lifestyle recommendations:  Limit saturated fat, trans fat, sodium, red meat, sweets and sugar-sweetened beverages. If you choose to eat red meat, compare labels and select the leanest cuts available.  Aim for at least 150 minutes of moderate physical activity or 75 minutes of vigorous physical activity - or an equal combination of both - each week.     During business hours: [phone], press option # 1 to be routed to the [facility] then option # 3 for medical questions to speak with a nurse     After hours, weekends or holidays: On Call Cardiologist- [phone]   option #4 and ask to speak to the on-call Cardiologist. Inform them you are a CORE/heart failure patient at the [facility].     Shayna Avery RN BSN  Cardiology Nurse Care Coordinator     Keep up the good work!     Take Care!

## 2019-03-13 NOTE — NURSING NOTE
Diet: Patient instructed regarding a heart failure healthy diet, including discussion of reduced fat and 2000 mg daily sodium restriction, daily weights, medication purpose and compliance, fluid restrictions and resources for patient and family to access for assistance with heart failure management.       Labs: Patient was given results of the laboratory testing obtained today and patient was instructed about when to return for the next laboratory testing. Repeat BMP around 3/22/19    Med Reconcile: Reviewed and verified all current medications with the patient. The updated medication list was printed and given to the patient. Increase losartan to 50mg in AM and 25mg in PM. Take bumex 2mg daily until weight decreases 10lb.    Return Appointment: Patient given instructions regarding scheduling next clinic visit. [facility] in 6 weeks. Dr. Wolf in 3 months. Will start process for CardioMEMS    Patient stated she understood all health information given and agreed to call with further questions or concerns.       Shayna Avery RN

## 2019-03-15 ENCOUNTER — HOSPITAL ENCOUNTER (OUTPATIENT)
Dept: PHYSICAL THERAPY | Facility: CLINIC | Age: 74
Setting detail: THERAPIES SERIES
End: 2019-03-15
Attending: FAMILY MEDICINE
Payer: MEDICARE

## 2019-03-15 PROCEDURE — 97112 NEUROMUSCULAR REEDUCATION: CPT | Mod: GP | Performed by: PHYSICAL THERAPIST

## 2019-03-15 NOTE — DISCHARGE INSTRUCTIONS
Josr String exercise for right eye strengthening  - Sit with the end of the string secured at nose height while you are sitting.  - Hold the other end of the string to your nose  - Look at the farthest bead to make it single - you should see 2 of the other 2 beads  - Move to the middle bead - make it single, the string should look like an X  - Move to the closest bead - the string should be a V  - 3x per bead, 1x per day
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
## 2019-03-18 DIAGNOSIS — I50.32 CHRONIC DIASTOLIC HEART FAILURE (H): Primary | Chronic | ICD-10-CM

## 2019-03-19 ENCOUNTER — PATIENT OUTREACH (OUTPATIENT)
Dept: CARE COORDINATION | Facility: CLINIC | Age: 74
End: 2019-03-19
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
## 2019-03-19 ASSESSMENT — ACTIVITIES OF DAILY LIVING (ADL): DEPENDENT_IADLS:: INDEPENDENT

## 2019-03-19 NOTE — PROGRESS NOTES
Clinic Care Coordination Contact  RUST/Voicemail    Referral Source: ED Follow-Up    ED visit 2/13/2019- Fall lightheadedness double vision headaches    Clinical Data: Care Coordinator Outreach    Outreach attempted x 1.  Left message on voicemail with call back information and requested return call.    Plan: Care Coordinator will await a return call from the patient     Senia Durán RN / Clinical Care Coordinator     [facility]   [email or web address]  Office :  [phone] / Fax :  [phone]

## 2019-03-21 DIAGNOSIS — I50.32 CHRONIC DIASTOLIC HEART FAILURE (H): Chronic | ICD-10-CM

## 2019-03-21 LAB
ANION GAP SERPL CALCULATED.3IONS-SCNC: 7 MMOL/L (ref 3–14)
BUN SERPL-MCNC: 35 MG/DL (ref 7–30)
CALCIUM SERPL-MCNC: 7.7 MG/DL (ref 8.5–10.1)
CHLORIDE SERPL-SCNC: 107 MMOL/L (ref 94–109)
CO2 SERPL-SCNC: 29 MMOL/L (ref 20–32)
CREAT SERPL-MCNC: 1.51 MG/DL (ref 0.52–1.04)
GFR SERPL CREATININE-BSD FRML MDRD: 34 ML/MIN/{1.73_M2}
GLUCOSE SERPL-MCNC: 124 MG/DL (ref 70–99)
POTASSIUM SERPL-SCNC: 4.3 MMOL/L (ref 3.4–5.3)
SODIUM SERPL-SCNC: 143 MMOL/L (ref 133–144)

## 2019-03-21 PROCEDURE — 36415 COLL VENOUS BLD VENIPUNCTURE: CPT | Performed by: INTERNAL MEDICINE

## 2019-03-21 PROCEDURE — 80048 BASIC METABOLIC PNL TOTAL CA: CPT | Performed by: INTERNAL MEDICINE

## 2019-03-22 ENCOUNTER — HOSPITAL ENCOUNTER (OUTPATIENT)
Dept: OCCUPATIONAL THERAPY | Facility: CLINIC | Age: 74
Setting detail: THERAPIES SERIES
End: 2019-03-22
Attending: FAMILY MEDICINE
Payer: MEDICARE

## 2019-03-22 ENCOUNTER — HOSPITAL ENCOUNTER (OUTPATIENT)
Dept: PHYSICAL THERAPY | Facility: CLINIC | Age: 74
Setting detail: THERAPIES SERIES
End: 2019-03-22
Attending: FAMILY MEDICINE
Payer: MEDICARE

## 2019-03-22 PROCEDURE — 97535 SELF CARE MNGMENT TRAINING: CPT | Mod: GO | Performed by: OCCUPATIONAL THERAPIST

## 2019-03-22 PROCEDURE — 97112 NEUROMUSCULAR REEDUCATION: CPT | Mod: GP | Performed by: PHYSICAL THERAPIST

## 2019-03-22 PROCEDURE — 97110 THERAPEUTIC EXERCISES: CPT | Mod: GP | Performed by: PHYSICAL THERAPIST

## 2019-03-22 PROCEDURE — 97012 MECHANICAL TRACTION THERAPY: CPT | Mod: GP | Performed by: PHYSICAL THERAPIST

## 2019-03-25 ENCOUNTER — TELEPHONE (OUTPATIENT)
Dept: CARDIOLOGY | Facility: CLINIC | Age: 74
End: 2019-03-25

## 2019-03-25 NOTE — TELEPHONE ENCOUNTER
"Called Mariel to check in. We reviewed her lab results - no changes at this time per Dr. Wolf.  Mariel tells me she \"doesn't feel so good\" today due to a migraine headache which has been ongoing for 3 days. She tells me prior to migraine she felt okay. She is currently down 5 pounds. I let Mariel know we were still waiting to hear about her CardioMEMS implant approval from insurance and that I will contact her as soon as we hear back. She states understanding and reports no additional questions at this time.   "

## 2019-03-27 ENCOUNTER — HOSPITAL ENCOUNTER (OUTPATIENT)
Dept: OCCUPATIONAL THERAPY | Facility: CLINIC | Age: 74
Setting detail: THERAPIES SERIES
End: 2019-03-27
Attending: FAMILY MEDICINE
Payer: MEDICARE

## 2019-03-27 ENCOUNTER — HOSPITAL ENCOUNTER (OUTPATIENT)
Dept: PHYSICAL THERAPY | Facility: CLINIC | Age: 74
Setting detail: THERAPIES SERIES
End: 2019-03-27
Attending: FAMILY MEDICINE
Payer: MEDICARE

## 2019-03-27 ENCOUNTER — TELEPHONE (OUTPATIENT)
Dept: CARDIOLOGY | Facility: CLINIC | Age: 74
End: 2019-03-27

## 2019-03-27 PROCEDURE — 97110 THERAPEUTIC EXERCISES: CPT | Mod: GP | Performed by: PHYSICAL THERAPIST

## 2019-03-27 PROCEDURE — 97116 GAIT TRAINING THERAPY: CPT | Mod: GP | Performed by: PHYSICAL THERAPIST

## 2019-03-27 PROCEDURE — 97535 SELF CARE MNGMENT TRAINING: CPT | Mod: GO | Performed by: OCCUPATIONAL THERAPIST

## 2019-03-27 PROCEDURE — 97012 MECHANICAL TRACTION THERAPY: CPT | Mod: GP | Performed by: PHYSICAL THERAPIST

## 2019-03-28 ENCOUNTER — TELEPHONE (OUTPATIENT)
Dept: ENDOCRINOLOGY | Facility: CLINIC | Age: 74
End: 2019-03-28

## 2019-03-28 NOTE — TELEPHONE ENCOUNTER
Odalis Medley PA-C Schwendeman, Connie M, RN   Cc: Bianca Pena CMA   Caller: Unspecified (Today,  8:23 AM)             Sonya:   Could you see if pt could be seen by Podiatry and or her PCC soon.   Thanks   Vero      Spoke w/ Pt: states understanding of recommendation to contact PCP if she needs immediate assessment of her toe. Also confirmed appt with Dr Jaeger Wed April 03, 2019 at 1:00pm.       [facility] Call Center    Phone Message    May a detailed message be left on voicemail: yes    Reason for Call: Symptoms or Concerns     If patient has red-flag symptoms, warm transfer to triage line    Current symptom or concern: Possible infected toe    Symptoms have been present for:  few day(s)    Has patient previously been seen for this? No    By : Jasmyne        Are there any new or worsening symptoms? Yes: diabetic PT clipped her toenail over the weekend and is experiencing pain.  She thinks it's infected.  Please follow up.      Action Taken: Message routed to:  [facility] ([facility] Endo

## 2019-04-01 DIAGNOSIS — M25.512 PAIN IN JOINT OF LEFT SHOULDER: ICD-10-CM

## 2019-04-01 NOTE — TELEPHONE ENCOUNTER
LYRICA 100 MG capsule      Last Written Prescription Date:  12-31-18  Last Fill Quantity: 180 tab,   # refills: 0  Last Office Visit: 3-11-19  Future Office visit:       Routing refill request to provider for review/approval because:  Drug not on the [facility], [facility] or [facility] refill protocol or controlled substance

## 2019-04-02 RX ORDER — PREGABALIN 100 MG/1
100 CAPSULE ORAL 2 TIMES DAILY
Qty: 180 CAPSULE | Refills: 3 | Status: ON HOLD | OUTPATIENT
Start: 2019-04-02 | End: 2019-05-02

## 2019-04-02 NOTE — TELEPHONE ENCOUNTER
"TC faxed script to pharmacy and placed in provider's \"faxed\" jeramy.       Nicole RETANA     [facility]        "

## 2019-04-03 ENCOUNTER — HOSPITAL ENCOUNTER (OUTPATIENT)
Dept: PHYSICAL THERAPY | Facility: CLINIC | Age: 74
Setting detail: THERAPIES SERIES
End: 2019-04-03
Attending: FAMILY MEDICINE
Payer: MEDICARE

## 2019-04-03 ENCOUNTER — HOSPITAL ENCOUNTER (OUTPATIENT)
Dept: OCCUPATIONAL THERAPY | Facility: CLINIC | Age: 74
Setting detail: THERAPIES SERIES
End: 2019-04-03
Attending: FAMILY MEDICINE
Payer: MEDICARE

## 2019-04-03 ENCOUNTER — OFFICE VISIT (OUTPATIENT)
Dept: ENDOCRINOLOGY | Facility: CLINIC | Age: 74
End: 2019-04-03
Payer: MEDICARE

## 2019-04-03 VITALS
HEART RATE: 64 BPM | SYSTOLIC BLOOD PRESSURE: 152 MMHG | DIASTOLIC BLOOD PRESSURE: 67 MMHG | HEIGHT: 66 IN | BODY MASS INDEX: 52 KG/M2

## 2019-04-03 DIAGNOSIS — Z79.4 TYPE 2 DIABETES MELLITUS WITH STAGE 3 CHRONIC KIDNEY DISEASE, WITH LONG-TERM CURRENT USE OF INSULIN (H): Primary | Chronic | ICD-10-CM

## 2019-04-03 DIAGNOSIS — E11.22 TYPE 2 DIABETES MELLITUS WITH STAGE 3 CHRONIC KIDNEY DISEASE, WITH LONG-TERM CURRENT USE OF INSULIN (H): Primary | Chronic | ICD-10-CM

## 2019-04-03 DIAGNOSIS — N18.30 TYPE 2 DIABETES MELLITUS WITH STAGE 3 CHRONIC KIDNEY DISEASE, WITH LONG-TERM CURRENT USE OF INSULIN (H): Primary | Chronic | ICD-10-CM

## 2019-04-03 LAB — HBA1C MFR BLD: 6.5 % (ref 4.3–6)

## 2019-04-03 PROCEDURE — 97530 THERAPEUTIC ACTIVITIES: CPT | Mod: GP | Performed by: PHYSICAL THERAPIST

## 2019-04-03 PROCEDURE — 97535 SELF CARE MNGMENT TRAINING: CPT | Mod: GO,59 | Performed by: OCCUPATIONAL THERAPIST

## 2019-04-03 PROCEDURE — 97110 THERAPEUTIC EXERCISES: CPT | Mod: GP | Performed by: PHYSICAL THERAPIST

## 2019-04-03 PROCEDURE — 97112 NEUROMUSCULAR REEDUCATION: CPT | Mod: GP | Performed by: PHYSICAL THERAPIST

## 2019-04-03 ASSESSMENT — PAIN SCALES - GENERAL: PAINLEVEL: NO PAIN (0)

## 2019-04-03 NOTE — PATIENT INSTRUCTIONS
4 month follow up with Vero Medley  8 month follow up with me.   Podiatry referral was made today.
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
## 2019-04-09 ENCOUNTER — HOSPITAL ENCOUNTER (EMERGENCY)
Facility: CLINIC | Age: 74
Discharge: HOME | End: 2019-04-10
Attending: EMERGENCY MEDICINE | Admitting: EMERGENCY MEDICINE
Payer: MEDICARE

## 2019-04-09 ENCOUNTER — TELEPHONE (OUTPATIENT)
Dept: FAMILY MEDICINE | Facility: CLINIC | Age: 74
End: 2019-04-09

## 2019-04-09 DIAGNOSIS — G44.229 CHRONIC TENSION-TYPE HEADACHE, NOT INTRACTABLE: ICD-10-CM

## 2019-04-09 LAB
ALBUMIN SERPL-MCNC: 3.6 G/DL (ref 3.4–5)
ALP SERPL-CCNC: 114 U/L (ref 40–150)
ALT SERPL W P-5'-P-CCNC: 22 U/L (ref 0–50)
ANION GAP SERPL CALCULATED.3IONS-SCNC: 8 MMOL/L (ref 3–14)
AST SERPL W P-5'-P-CCNC: 20 U/L (ref 0–45)
BASOPHILS # BLD AUTO: 0 10E9/L (ref 0–0.2)
BASOPHILS NFR BLD AUTO: 0.3 %
BILIRUB SERPL-MCNC: 0.3 MG/DL (ref 0.2–1.3)
BUN SERPL-MCNC: 26 MG/DL (ref 7–30)
CALCIUM SERPL-MCNC: 9.4 MG/DL (ref 8.5–10.1)
CHLORIDE SERPL-SCNC: 108 MMOL/L (ref 94–109)
CO2 SERPL-SCNC: 26 MMOL/L (ref 20–32)
CREAT SERPL-MCNC: 1.43 MG/DL (ref 0.52–1.04)
DIFFERENTIAL METHOD BLD: ABNORMAL
EOSINOPHIL # BLD AUTO: 0.3 10E9/L (ref 0–0.7)
EOSINOPHIL NFR BLD AUTO: 4.6 %
ERYTHROCYTE [DISTWIDTH] IN BLOOD BY AUTOMATED COUNT: 13.6 % (ref 10–15)
GFR SERPL CREATININE-BSD FRML MDRD: 36 ML/MIN/{1.73_M2}
GLUCOSE SERPL-MCNC: 94 MG/DL (ref 70–99)
HCT VFR BLD AUTO: 40.5 % (ref 35–47)
HGB BLD-MCNC: 12.9 G/DL (ref 11.7–15.7)
IMM GRANULOCYTES # BLD: 0 10E9/L (ref 0–0.4)
IMM GRANULOCYTES NFR BLD: 0.3 %
LYMPHOCYTES # BLD AUTO: 2 10E9/L (ref 0.8–5.3)
LYMPHOCYTES NFR BLD AUTO: 27.7 %
MAGNESIUM SERPL-MCNC: 1.8 MG/DL (ref 1.6–2.3)
MCH RBC QN AUTO: 28.4 PG (ref 26.5–33)
MCHC RBC AUTO-ENTMCNC: 31.9 G/DL (ref 31.5–36.5)
MCV RBC AUTO: 89 FL (ref 78–100)
MONOCYTES # BLD AUTO: 0.6 10E9/L (ref 0–1.3)
MONOCYTES NFR BLD AUTO: 8.5 %
NEUTROPHILS # BLD AUTO: 4.2 10E9/L (ref 1.6–8.3)
NEUTROPHILS NFR BLD AUTO: 58.6 %
NRBC # BLD AUTO: 0 10*3/UL
NRBC BLD AUTO-RTO: 0 /100
PLATELET # BLD AUTO: 127 10E9/L (ref 150–450)
POTASSIUM SERPL-SCNC: 4.1 MMOL/L (ref 3.4–5.3)
PROT SERPL-MCNC: 6.8 G/DL (ref 6.8–8.8)
RBC # BLD AUTO: 4.55 10E12/L (ref 3.8–5.2)
SODIUM SERPL-SCNC: 142 MMOL/L (ref 133–144)
WBC # BLD AUTO: 7.2 10E9/L (ref 4–11)

## 2019-04-09 PROCEDURE — 83735 ASSAY OF MAGNESIUM: CPT | Performed by: EMERGENCY MEDICINE

## 2019-04-09 PROCEDURE — 25000128 H RX IP 250 OP 636: Performed by: EMERGENCY MEDICINE

## 2019-04-09 PROCEDURE — 93005 ELECTROCARDIOGRAM TRACING: CPT | Performed by: EMERGENCY MEDICINE

## 2019-04-09 PROCEDURE — 96375 TX/PRO/DX INJ NEW DRUG ADDON: CPT | Performed by: EMERGENCY MEDICINE

## 2019-04-09 PROCEDURE — A9270 NON-COVERED ITEM OR SERVICE: HCPCS | Mod: GY | Performed by: EMERGENCY MEDICINE

## 2019-04-09 PROCEDURE — 99284 EMERGENCY DEPT VISIT MOD MDM: CPT | Mod: 25 | Performed by: EMERGENCY MEDICINE

## 2019-04-09 PROCEDURE — 93010 ELECTROCARDIOGRAM REPORT: CPT | Mod: Z6 | Performed by: EMERGENCY MEDICINE

## 2019-04-09 PROCEDURE — 96365 THER/PROPH/DIAG IV INF INIT: CPT | Performed by: EMERGENCY MEDICINE

## 2019-04-09 PROCEDURE — 80053 COMPREHEN METABOLIC PANEL: CPT | Performed by: EMERGENCY MEDICINE

## 2019-04-09 PROCEDURE — 25800030 ZZH RX IP 258 OP 636

## 2019-04-09 PROCEDURE — 96361 HYDRATE IV INFUSION ADD-ON: CPT | Performed by: EMERGENCY MEDICINE

## 2019-04-09 PROCEDURE — 85025 COMPLETE CBC W/AUTO DIFF WBC: CPT | Performed by: EMERGENCY MEDICINE

## 2019-04-09 PROCEDURE — 25000132 ZZH RX MED GY IP 250 OP 250 PS 637: Mod: GY | Performed by: EMERGENCY MEDICINE

## 2019-04-09 RX ORDER — MAGNESIUM SULFATE 1 G/100ML
1 INJECTION INTRAVENOUS ONCE
Status: COMPLETED | OUTPATIENT
Start: 2019-04-09 | End: 2019-04-09

## 2019-04-09 RX ORDER — SODIUM CHLORIDE 9 MG/ML
INJECTION, SOLUTION INTRAVENOUS
Status: DISCONTINUED
Start: 2019-04-09 | End: 2019-04-09 | Stop reason: HOSPADM

## 2019-04-09 RX ORDER — ACETAMINOPHEN 325 MG/1
650 TABLET ORAL ONCE
Status: COMPLETED | OUTPATIENT
Start: 2019-04-09 | End: 2019-04-09

## 2019-04-09 RX ORDER — METOCLOPRAMIDE HYDROCHLORIDE 5 MG/ML
10 INJECTION INTRAMUSCULAR; INTRAVENOUS ONCE
Status: COMPLETED | OUTPATIENT
Start: 2019-04-09 | End: 2019-04-09

## 2019-04-09 RX ORDER — LIDOCAINE 4 G/G
1 PATCH TOPICAL ONCE
Status: DISCONTINUED | OUTPATIENT
Start: 2019-04-09 | End: 2019-04-10 | Stop reason: HOSPADM

## 2019-04-09 RX ORDER — OLANZAPINE 5 MG/1
5 TABLET, ORALLY DISINTEGRATING ORAL ONCE
Status: COMPLETED | OUTPATIENT
Start: 2019-04-09 | End: 2019-04-09

## 2019-04-09 RX ADMIN — Medication 250 ML: at 21:06

## 2019-04-09 RX ADMIN — LIDOCAINE 1 PATCH: 560 PATCH PERCUTANEOUS; TOPICAL; TRANSDERMAL at 22:31

## 2019-04-09 RX ADMIN — METOCLOPRAMIDE 10 MG: 5 INJECTION, SOLUTION INTRAMUSCULAR; INTRAVENOUS at 21:08

## 2019-04-09 RX ADMIN — ACETAMINOPHEN 650 MG: 325 TABLET, FILM COATED ORAL at 21:17

## 2019-04-09 RX ADMIN — MAGNESIUM SULFATE IN DEXTROSE 1 G: 10 INJECTION, SOLUTION INTRAVENOUS at 21:14

## 2019-04-09 RX ADMIN — SODIUM CHLORIDE 250 ML: 9 INJECTION, SOLUTION INTRAVENOUS at 21:06

## 2019-04-09 RX ADMIN — OLANZAPINE 5 MG: 5 TABLET, ORALLY DISINTEGRATING ORAL at 23:17

## 2019-04-09 ASSESSMENT — ENCOUNTER SYMPTOMS
VOMITING: 0
HEADACHES: 1
NAUSEA: 0
NUMBNESS: 1
PHOTOPHOBIA: 1
APPETITE CHANGE: 0

## 2019-04-09 NOTE — ED AVS SNAPSHOT
[facility], [place], Emergency Department  [address]  [facility] [address]  Phone:  [phone]  Fax:  [phone]                                    Mariel Ribeiro   MRN: [id number]    Department:  [facility], Emergency Department   Date of Visit:  4/9/2019           After Visit Summary Signature Page    I have received my discharge instructions, and my questions have been answered. I have discussed any challenges I see with this plan with the nurse or doctor.    ..........................................................................................................................................  Patient/Patient Representative Signature      ..........................................................................................................................................  Patient Representative Print Name and Relationship to Patient    ..................................................               ................................................  Date                                   Time    ..........................................................................................................................................  Reviewed by Signature/Title    ...................................................              ..............................................  Date                                               Time          22EPIC Rev 08/18
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
## 2019-04-09 NOTE — TELEPHONE ENCOUNTER
Reason for Call:  Other call back    Detailed comments: pt called and would like to speak with a nurse on a [facility] headack for more than a week. Please Advise    Phone Number Patient can be reached at: Home number on file [phone] (home)    Best Time: anytiem    Can we leave a detailed message on this number? YES    Call taken on 4/9/2019 at 4:13 PM by Chloe Ospina

## 2019-04-10 ENCOUNTER — OFFICE VISIT (OUTPATIENT)
Dept: FAMILY MEDICINE | Facility: CLINIC | Age: 74
End: 2019-04-10
Payer: MEDICARE

## 2019-04-10 ENCOUNTER — PATIENT OUTREACH (OUTPATIENT)
Dept: CARE COORDINATION | Facility: CLINIC | Age: 74
End: 2019-04-10

## 2019-04-10 VITALS
BODY MASS INDEX: 53.29 KG/M2 | OXYGEN SATURATION: 95 % | RESPIRATION RATE: 14 BRPM | HEART RATE: 55 BPM | TEMPERATURE: 97.4 F | WEIGHT: 293 LBS | DIASTOLIC BLOOD PRESSURE: 70 MMHG | SYSTOLIC BLOOD PRESSURE: 143 MMHG

## 2019-04-10 VITALS
RESPIRATION RATE: 20 BRPM | DIASTOLIC BLOOD PRESSURE: 67 MMHG | SYSTOLIC BLOOD PRESSURE: 127 MMHG | OXYGEN SATURATION: 98 % | TEMPERATURE: 97.9 F | HEART RATE: 64 BPM

## 2019-04-10 DIAGNOSIS — R51.9 HEADACHE: Primary | ICD-10-CM

## 2019-04-10 DIAGNOSIS — R51.9 INTRACTABLE HEADACHE, UNSPECIFIED CHRONICITY PATTERN, UNSPECIFIED HEADACHE TYPE: Primary | ICD-10-CM

## 2019-04-10 DIAGNOSIS — N18.30 CKD (CHRONIC KIDNEY DISEASE) STAGE 3, GFR 30-59 ML/MIN (H): ICD-10-CM

## 2019-04-10 LAB — INTERPRETATION ECG - MUSE: NORMAL

## 2019-04-10 PROCEDURE — 99214 OFFICE O/P EST MOD 30 MIN: CPT | Mod: 25 | Performed by: FAMILY MEDICINE

## 2019-04-10 PROCEDURE — 96372 THER/PROPH/DIAG INJ SC/IM: CPT | Performed by: FAMILY MEDICINE

## 2019-04-10 RX ORDER — KETOROLAC TROMETHAMINE 30 MG/ML
30 INJECTION, SOLUTION INTRAMUSCULAR; INTRAVENOUS ONCE
Status: COMPLETED | OUTPATIENT
Start: 2019-04-10 | End: 2019-04-10

## 2019-04-10 RX ADMIN — KETOROLAC TROMETHAMINE 30 MG: 30 INJECTION, SOLUTION INTRAMUSCULAR; INTRAVENOUS at 12:37

## 2019-04-10 ASSESSMENT — ACTIVITIES OF DAILY LIVING (ADL): DEPENDENT_IADLS:: INDEPENDENT

## 2019-04-10 ASSESSMENT — PAIN SCALES - GENERAL: PAINLEVEL: EXTREME PAIN (9)
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
## 2019-04-10 NOTE — NURSING NOTE
Prior to injection, verified patient identity using patient's name and date of birth.  Due to injection administration, patient instructed to remain in clinic for 15 minutes  afterwards, and to report any adverse reaction to me immediately.    Ketorolac 30mg    Drug Amount Wasted:  None.  Vial/Syringe: Single dose vial  Expiration Date:  02/20

## 2019-04-10 NOTE — PROGRESS NOTES
Late entry from a VM left late in the day yesterday  4/9/2019 from Odalis Lowry Roommate  Requesting a call back due to the patient has been experiencing headaches for 1 week    Clinic Care Coordination Contact    Situation: Patient chart reviewed by care coordinator.    Background: Patient also called the clinic and was instructed to go to the ed per triage RN         Assessment: Patient was discharged from the ED this morning     Plan/Recommendations: CC will do a follow up with the patient today    Senia Durán RN, Care Coordinator   [facility] -Care Coordination  Doug Christine

## 2019-04-10 NOTE — DISCHARGE INSTRUCTIONS
You received tylenol, magnesium, reglan, saline, and zyprexa today  You should continue your concussion physical therapy  You should see your primary doctor in 2 days  Return to the emergency department for worsening pain, confusion, nausea/vomiting, numbness/weakness, or if you have any new or changing symptoms/concerns.

## 2019-04-10 NOTE — PROGRESS NOTES
Clinic Care Coordination Contact    Clinic Care Coordination Contact  OUTREACH    Referral Information:  Referral Source: ED Follow-Up    Primary Diagnosis: Neurological Disorders(weakness low blood sugar )    Chief Complaint   Patient presents with     Clinic Care Coordination - [facility]     Clinic Care Coordination RN         Universal Utilization: ED visit 4/9/2019  Headache for 13 days   Clinic Utilization  Difficulty keeping appointments:: No  Compliance Concerns: No  No PCP office visit in Past Year: No  Utilization    Last refreshed: 4/10/2019  9:36 AM:  Hospital Admissions 4           Last refreshed: 4/10/2019  9:36 AM:  ED Visits 4           Last refreshed: 4/10/2019  9:36 AM:  No Show Count (past year) 6              Current as of: 4/10/2019  9:36 AM              Clinical Concerns:  Current Medical Concerns:  Patient reports she continues to have a piercing headache above left ey  Describes as a # 9  Light sensitive  Ice makes the headache worse will try heat  Patient will continue the Tylenol as directed and rest in a dark room .  CC transferred the patient to the appointment line to make an ED follow up ASAP  Patient continues concussion clinic  Lightheadedness and dizziness continues intermittently  Patient continues to use the cane and walker for safety r  Pain  Pain (GOAL):: Yes  Location of chronic pain:: Back Fibermyalgia  Progression: Unchanged  Health Maintenance Reviewed: Not assessed  Clinical Pathway: None    Medication Management:  No changes in ED    Functional Status:  Dependent IADLs:: Independent  Bed or wheelchair confined:: No    Living Situation: Lives with a roommate        Diet/Exercise/Sleep:  Food Insecurity: No  Tube Feeding: No  Exercise:: Unable to exercise  Inadequate activity/exercise (GOAL):: No  Significant changes in sleep pattern (GOAL): No    Transportation:  Transportation concerns (GOAL):: No  Transportation means:: Family       Financial/Insurance:    Financial/Insurance concerns (GOAL):: No     Supplies used at home:: Nebulizer tubing       Advance Care Plan/Directive  Type Advanced Care Plans/Directives: Advanced Directive - On File          Goals:   Goals        General    # 2 Pain Management (pt-stated)     Notes - Note edited  4/10/2019 10:20 AM by Senia Durán RN    Goal Statement: I will decrease back pain   Measure of Success: Increased mobility with daily activity   Supportive Steps to Achieve:   I will start taking Tylenol 500 mg 3 times a day per Dr William  I will take Lyrica as directed    I will use heat ice and rest   I will use my cane for safety  Barriers: Deconditioned   Strengths: Continues PT OT   Date to Achieve By: 5/10/2019  Patient expressed understanding of goal: yes        # 3 Medical (pt-stated)     Notes - Note edited  4/10/2019 10:21 AM by Senia Durán RN    Goal Statement:I will prevent a future fall   Measure of Success: No falls  Supportive Steps to Achieve:  I will use cane or walker at all times   I will use lifeline when I go to the bathroom because walker doesn't fit   Barriers:Lightheaded /Dizzy leg weakness   Strengths: outpatient PT/OT 3/6  Date to Achieve By: 5/10/2019  Patient expressed understanding of goal: Yes        #1 Medical (pt-stated)     Notes - Note edited  4/10/2019 10:21 AM by Senia Durán RN    Goal Statement: I will monitor shortness of breath episodes   Measure of Success: More energy for daily activity   Supportive Steps to Achieve:     I will seek medical help if experiencing increased shortness of breath episodes   I will follow  COPD Action Plan   Barriers: None identified   Strengths: supportive room mate   Date to Achieve By  5/8/2019  Patient expressed understanding of goal: Yes               Patient/Caregiver understanding: Patient has a good understanding of discharge instruction     Outreach Frequency: weekly  Future Appointments              In 1 week Digna Hayden, PT [facility],  [place], Physical Therapy - Outpatient, [place]    In 1 week Omar Greene, OT [facility], [place], Occupational Therapy - Outpatient, [place]    In 2 weeks Noah Girard MD [facility]    In 2 weeks  LAB  Health Lab, [facility]    In 2 weeks Starla Saez NP [facility], [facility]    In 2 weeks [facility] Fitzgerald, [facility]    In 3 weeks Omar Greene, OT [facility], [place], Occupational Therapy - Outpatient, [place]    In 3 weeks Digna Hayden, PT [facility], Physical Therapy - Outpatient, [place]    In 4 weeks Digna Hayden, PT [facility], Physical Therapy - Outpatient, [place]    In 4 weeks Omar Greene, OT [facility], Occupational Therapy - Outpatient, [place]    In 2 months  LAB  Health [facility], [facility]    In 2 months Stephanie Wolf MD [facility]    In 3 months Odalis Medley PA-C [facility]          Plan:  Patient will keep hospital follow up and concussion clinic appointments   Patient will rest in a dark room take tylenol as directed and try heat   CC will follow up in 3-5 business days     Senia Durán RN, Care Coordinator   [facility] -Care Coordination  Doug Christine

## 2019-04-10 NOTE — PROGRESS NOTES
SUBJECTIVE:   Mariel Ribeiro is a 73 year old female who presents to clinic today for the following health issues:      HPI     Presents for follow up after being seen at [facility] ED for intractable HA present the last 2 weeks without any period of relief.  She has never had a migraine this bad.  She was given 5mg/Tylenol/Reglan/MgSO4 and IVF without any relief.  She presents today miserable asking for pain relief.    Patient with complicated past medical hx.  PCP Nataliia.    Hx of CKD Stage 3 but takes ASA daily.  States has had no restrictions she is aware of with NSAIDs.    Additional history: as documented    Reviewed and updated as needed this visit by clinical staff  Tobacco  Allergies  Meds  Med Hx  Surg Hx  Fam Hx  Soc Hx        Reviewed and updated as needed this visit by Provider             Patient Active Problem List   Diagnosis     Hypothyroidism      HX PHYSICAL ABUSE     Coronary atherosclerosis     Myalgia and myositis     Migraine     Chronic airway obstruction/ COPD     Mononeuritis     FAMILY HX-THROMBOSIS     Obstructive sleep apnea     Vitamin D deficiency     Hyperuricemia     Hypertension goal BP (blood pressure) < 130/80     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)     Hyperlipidemia with target LDL less than 70     Chronic diastolic heart failure (H)     Intermediate coronary syndrome (H)     Osteoarthritis     Morbid obesity (H)     Arthritis of knee     Constipation     Hypokalemia     Transient cerebral ischemia     Pain in joint of left shoulder     History of thyroid cancer     Cervicalgia     Cholelithiasis     Pancreatitis, acute     Fatty liver disease, nonalcoholic     Hepatomegaly     Angina at rest (H)     S/P CABG x 3     Type 2 diabetes mellitus with stage 3 chronic kidney disease, with long-term current use of insulin (H)     Lymphedema     Syncope     Lower back pain     Falls     Thrombocytopenia (H)     Bilateral carotid artery disease (H)     Spinal stenosis of  lumbar region, unspecified whether neurogenic claudication present     Cervical stenosis of spinal canal     Past Surgical History:   Procedure Laterality Date     ANGIOGRAPHY  8/11    with stent placement X2 mid- and proximal LAD     ARTHROPLASTY KNEE  1/28/2014    Procedure: ARTHROPLASTY KNEE;  ARTHROPLASTY KNEE -RIGHT TOTAL  ;  Surgeon: Victor Manuel Wolff MD;  Location: [facility] OR     BYPASS GRAFT ARTERY CORONARY N/A 7/2/2018    Procedure: BYPASS GRAFT ARTERY CORONARY;  Median Sternotomy, On Cardiopulmonary Bypass Pump, Coronary Artery Bypass Graft x 3, using Left Internal Mammary and Endoscopic Vein [place] on Bilateral Saphenous Vein, Transesophageal Echocardiogram, Epi-aortic Ultrasound;  Surgeon: Joao Mason MD;  Location: [facility] OR     C TOTAL KNEE ARTHROPLASTY  7/30/04    Knee Replacement, Total right and left     CATARACT IOL, RT/LT Bilateral      COLONOSCOPY       DILATION AND CURETTAGE, OPERATIVE HYSTEROSCOPY WITH MORCELLATOR, COMBINED N/A 11/1/2016    Procedure: COMBINED DILATION AND CURETTAGE, OPERATIVE HYSTEROSCOPY WITH MORCELLATOR;  Surgeon: Stacey Arnold DO;  Location: [facility]     HC INTRODUCE NEEDLE/CATH, EXTREMITY ARTERY  1999,2002,2004    Angiocardiogram     HC KNEE SCOPE, DIAGNOSTIC  1990's    Arthroscopy, Knee, bilateral     LAPAROSCOPIC CHOLECYSTECTOMY N/A 8/11/2017    Procedure: LAPAROSCOPIC CHOLECYSTECTOMY;  Laparoscopic Cholecystectomy   *Latex Allergy*, Anesthesia Block;  Surgeon: Jeffrey Roberson MD;  Location: [facility] OR     PHACOEMULSIFICATION CLEAR CORNEA WITH STANDARD INTRAOCULAR LENS IMPLANT Right 12/29/2014    Procedure: PHACOEMULSIFICATION CLEAR CORNEA WITH STANDARD INTRAOCULAR LENS IMPLANT;  Surgeon: Smith Quintana MD;  Location:  [facility]     PHACOEMULSIFICATION CLEAR CORNEA WITH TORIC INTRAOCULAR LENS IMPLANT Left 1/12/2015    Procedure: PHACOEMULSIFICATION CLEAR CORNEA WITH TORIC INTRAOCULAR LENS IMPLANT;  Surgeon: Smith Quintana MD;  Location: [facility]      SURGICAL HISTORY OF -       dentures     SURGICAL HISTORY OF -       thyroidectomy     SURGICAL HISTORY OF -       right thumb surgery     SURGICAL HISTORY OF -       right breast biopsy (benign)     SURGICAL HISTORY OF -   2004    left shoulder surgery - rotator cuff     SURGICAL HISTORY OF -       left thumb surgery     THYROIDECTOMY         Social History     Tobacco Use     Smoking status: Former Smoker     Packs/day: 0.00     Years: 27.00     Pack years: 0.00     Types: Cigarettes     Last attempt to quit: 1989     Years since quittin.7     Smokeless tobacco: Never Used   Substance Use Topics     Alcohol use: No     Alcohol/week: 0.0 oz     Family History   Problem Relation Age of Onset     C.A.D. Mother      Diabetes Mother      Hypertension Mother      Blood Disease Mother         multiple episodes of thrombosis     Circulatory Mother         DVT X 2; ocular clot; cerebral; carotid artery stenosis     Glaucoma Mother      Macular Degeneration Mother      C.A.D. Father      Hypertension Father      Cerebrovascular Disease Father      Alcohol/Drug Father         etoh     Cancer Brother         liver,pancreas, brain     Cardiovascular Sister      Hypertension Sister      Hypertension Brother      Alcohol/Drug Sister         etoh     Alcohol/Drug Brother         drug     Diabetes Sister         younger     C.A.D. Sister         CABG age 65     C.A.D. Brother         CABG age 42     C.A.D. Sister         stents age 58     C.A.D. Brother      Genitourinary Problems Sister         kidney disease         Current Outpatient Medications   Medication Sig Dispense Refill     acetaminophen (TYLENOL) 325 MG tablet Take 2 tablets (650 mg) by mouth every 4 hours as needed for mild pain 100 tablet      albuterol (PROAIR HFA) 108 (90 Base) MCG/ACT inhaler Inhale 1-2 puffs into the lungs every 4 hours as needed for wheezing 1 Inhaler PRN     aspirin  MG tablet Take 1 tablet by mouth daily.  30 tablet 0     atorvastatin (LIPITOR) 40 MG tablet Take 1 tablet (40 mg) by mouth daily 30 tablet 0     blood glucose (NO BRAND SPECIFIED) test strip 1 strip by In Vitro route 4 times daily Strips per patient's preference 400 each 1     blood glucose monitoring (NO BRAND SPECIFIED) meter device kit Use to test blood sugar four times daily or as directed. 1 kit 0     bumetanide (BUMEX) 2 MG tablet Take 1 tablet (2 mg) by mouth every 48 hours May take 1mg extra as directed by [facility] clinic. 45 tablet 3     EPINEPHrine (EPIPEN/ADRENACLICK/OR ANY BX GENERIC EQUIV) 0.3 MG/0.3ML injection 2-pack Inject 0.3 mLs (0.3 mg) into the muscle once as needed for anaphylaxis 0.6 mL 0     escitalopram (LEXAPRO) 20 MG tablet Take 1 tablet (20 mg) by mouth every morning 30 tablet 0     ferrous gluconate (FERGON) 324 (38 Fe) MG tablet Take 1 tablet (324 mg) by mouth Every Mon, Wed, Fri Morning 36 tablet 3     FOLIC ACID PO Take 1 mg by mouth daily        guaiFENesin (MUCINEX) 600 MG 12 hr tablet Take 1 tablet (600 mg) by mouth 2 times daily       insulin isophane human (HUMULIN N KWIKPEN) 100 UNIT/ML injection Inject 28 units subcutaneous each am and each pm. On rehab days, 8 units in NPH and 28 units in the PM 15 mL 3     levothyroxine (SYNTHROID/LEVOTHROID) 150 MCG tablet Take 1 tablet (150 mcg) by mouth daily 30 tablet 0     Simple AdmitCAN FINEPOINT LANCETS MISC Use to test blood sugars 2 times daily or as directed. 100 each prn     lifitegrast (XIIDRA) 5 % opthalmic solution Place 1 drop into both eyes 2 times daily 1 each 11     losartan (COZAAR) 50 MG tablet Take 1 tablet (50 mg) by mouth every morning AND 0.5 tablets (25 mg) every evening. 135 tablet 3     Metoprolol Tartrate 75 MG TABS Take 75 mg by mouth 2 times daily 180 tablet 3     niacin (NIASPAN) 500 MG CR tablet Take 500 mg by mouth daily   9     nitroGLYcerin (NITROSTAT) 0.4 MG sublingual tablet For chest pain place 1 tablet under the tongue every 5 minutes for 3 doses. If  symptoms persist 5 minutes after 1st dose call 911. 25 tablet 0     ONE TOUCH ULTRA (DEVICES) MISC test blood sugar BID 1 0     potassium chloride ER (K-DUR/KLOR-CON M) 10 MEQ CR tablet Take 10 mEq Tuesday, Thurs, Sat, Sun. Take 20 MeQ Monday, Wednesday, Friday. 180 tablet 3     predniSONE (DELTASONE) 20 MG tablet Take 3 tablets once daily x 5 days. 15 tablet 0     pregabalin (LYRICA) 100 MG capsule Take 1 capsule (100 mg) by mouth 2 times daily 180 capsule 3     repaglinide (PRANDIN) 1 MG tablet Take 1 mg with meals.  Hold Prandin in the am and at noon on days you have rehab. 90 tablet 3     senna-docusate (SENOKOT-S;PERICOLACE) 8.6-50 MG per tablet Take 1-2 tablets by mouth 2 times daily as needed for constipation 30 tablet 0     traZODone (DESYREL) 50 MG tablet TAKE 3 TABLETS(150 MG) BY MOUTH AT BEDTIME 270 tablet 1     VITAMIN D, CHOLECALCIFEROL, PO Take 10,000 Units by mouth daily       Allergies   Allergen Reactions     Contrast Dye Anaphylaxis     Fish Allergy Anaphylaxis     Iodine Anaphylaxis     Oxycodone Other (See Comments)     Severe suicidal tendencies on this medication     Tree Nuts [Nuts] Anaphylaxis     Tree nuts only (peanuts ok)     Albuterol Other (See Comments)     Sandrita-oral erythema  Confirmed 7/3/18 that patient uses albuterol inhalers at home. Patient had her home inhaler in her bag.     Bactrim      Increased uric acid     Betadine [Povidone Iodine] Hives, Swelling and Difficulty breathing     Betadine     Combivent      Rash     Dulaglutide Other (See Comments)     Hx . Of thyroid cancer.     Fish      Lisinopril Other (See Comments)     Scr/grf severely reduced.      Penicillins Rash     Allopurinol Rash     Latex Rash     Wool Fiber Rash     Recent Labs   Lab Test 04/09/19  2050 03/21/19  1253 03/13/19  1508 02/13/19  1449  12/24/18  1610  09/10/18  0614  08/28/18  1427  07/01/18  0621  03/19/18  1404  03/08/17  1420   A1C  --   --   --   --   --   --   --  5.2  --   --   --  9.3*  --   8.2*   < > 7.2*   LDL  --   --  76  --   --   --   --   --   --   --   --   --   --  111*  --  89   HDL  --   --  71  --   --   --   --   --   --   --   --   --   --  50  --  50   TRIG  --   --  118  --   --   --   --   --   --   --   --   --   --  212*  --  237*   ALT 22  --   --  19  --  49  --   --   --   --    < > 26   < > 24   < >  --    CR 1.43* 1.51* 1.51* 1.42*  --  1.19*   < > 1.48*   < > 1.41*   < >  --    < > 1.28*   < > 1.19*   GFRESTIMATED 36* 34* 34* 37*  --  45*   < > 35*   < > 37*   < >  --    < > 41*   < > 45*   GFRESTBLACK 42* 39* 39* 42*  --  53*   < > 42*   < > 44*   < >  --    < > 50*   < > 54*   POTASSIUM 4.1 4.3 4.6 4.0   < > 4.5   < > 3.9   < > 4.0   < >  --    < > 3.6   < > 3.8   TSH  --   --   --   --   --   --   --   --   --  2.97  --   --   --  1.15   < > 0.12*    < > = values in this interval not displayed.      BP Readings from Last 3 Encounters:   04/10/19 127/67   04/10/19 143/70   04/03/19 152/67    Wt Readings from Last 3 Encounters:   04/09/19 149.7 kg (330 lb)   03/13/19 146.1 kg (322 lb)   02/13/19 141.5 kg (312 lb)                    ROS:  Constitutional, HEENT, cardiovascular, pulmonary, gi and gu systems are negative, except as otherwise noted.    OBJECTIVE:     /67   Pulse 64   Temp 97.9  F (36.6  C) (Oral)   Resp 20   SpO2 98%   There is no height or weight on file to calculate BMI.  GENERAL: healthy, alert wearing sunglasses in moderate pain, alone today in office  EYES: Eyes grossly normal to inspection, PERRL and conjunctivae and sclerae normal  NECK: no adenopathy, no asymmetry, masses, or scars and thyroid normal to palpation  RESP: lungs clear to auscultation - no rales, rhonchi or wheezes  CV: regular rate and rhythm, normal S1 S2, no S3 or S4, no murmur, click or rub, no peripheral edema and peripheral pulses strong  ABDOMEN: soft, nontender, no hepatosplenomegaly, no masses and bowel sounds normal  PSYCH: mentation appears normal, affect  normal/bright    ASSESSMENT/PLAN:     1. Intractable headache, unspecified chronicity pattern, unspecified headache type    - ketorolac (TORADOL) injection 30 mg  - INJECTION INTRAMUSCULAR OR SUB-Q    Received Toradol 30mg IM x 1-- within 30 minutes she stated she did start to feel some relief.  She is advised to go home and drink plenty of fluids and take a nap to further help HA resolve.  If no change or worsening symptoms, advised to return to ED prn.    2. CKD (chronic kidney disease) stage 3, GFR 30-59 ml/min (H)    Discussed NSAID use and last creatinine was in ED yesterday 1.43.  Patient wished to proceed with Toradol injection today.    Amee Connell MD  [facility]

## 2019-04-11 ENCOUNTER — PATIENT OUTREACH (OUTPATIENT)
Dept: CARE COORDINATION | Facility: CLINIC | Age: 74
End: 2019-04-11

## 2019-04-11 ASSESSMENT — ENCOUNTER SYMPTOMS
SHORTNESS OF BREATH: 1
NECK PAIN: 0

## 2019-04-11 NOTE — PROGRESS NOTES
Clinic Care Coordination Contact  Care Team Conversations    Chart reviewed. Clinic RN CC reached patient yesterday for ED follow up. Will close this encounter.     Vibha Carvajal RN  [facility]-Care Coordination  [facility]  [phone]

## 2019-04-23 NOTE — PROGRESS NOTES
[facility] Acute Rehabilitation Unit Progress Note    Patient Name: Mariel Ribeiro   YOB: 1945  MRN: [id number]     Age / Sex: 73 year old female    SUBJECTIVE/INTERVAL HX:    Patient was seen and examined at bedside. Reports continued discussion of discharge date causes her anxiety. She is ok to stay while her medical problems (volume overload) are being treated, but would not like to focus on the date. Staples removed today from left leg and right thigh. Incision looks healed, slight redness, but not infected. Hospitalist managing diuretics and continuing IV diuresis with PO chlorothiazide. Also, she will have an echo done to rule out other cause of volume retention. Cr stable at 1.5. Lymphedema wraps being managed with higher wrappings.    CURRENT MEDS:  Current Facility-Administered Medications   Medication     acetaminophen (TYLENOL) tablet 975 mg     albuterol (PROAIR HFA/PROVENTIL HFA/VENTOLIN HFA) Inhaler 1-2 puff     albuterol neb solution 2.5 mg     artificial saliva (BIOTENE MT) solution 1 spray     aspirin EC tablet 325 mg     atorvastatin (LIPITOR) tablet 40 mg     bumetanide (BUMEX) injection 3 mg     bumetanide (BUMEX) injection 3 mg     cholecalciferol (vitamin D3) capsule CAPS 10,000 Units     ciprofloxacin (CIPRO) tablet 250 mg     cyclobenzaprine (FLEXERIL) tablet 10 mg     glucose gel 15-30 g    Or     dextrose 50 % injection 25-50 mL    Or     glucagon injection 1 mg     EPINEPHrine (ADRENALIN) kit 0.3 mg     escitalopram (LEXAPRO) tablet 20 mg     febuxostat (ULORIC) tablet 40 mg     fluticasone-vilanterol (BREO ELLIPTA) 100-25 MCG/INH oral inhaler 1 puff     folic acid (FOLVITE) tablet 800 mcg     guaiFENesin (MUCINEX) 12 hr tablet 600 mg     hydrALAZINE (APRESOLINE) tablet 75 mg     HYDROmorphone (DILAUDID) tablet 2 mg     insulin aspart (NovoLOG) inj (RAPID ACTING)     insulin aspart (NovoLOG) inj (RAPID ACTING)     insulin isophane human  (HumuLIN N PEN) injection 30 Units     insulin isophane human (HumuLIN N PEN) injection 35 Units     ketamine (KETALAR) 5%-gabapentin (NEURONTIN) 8% topical PLO cream     levothyroxine (SYNTHROID/LEVOTHROID) tablet 150 mcg     Lidocaine (LIDOCARE) 4 % Patch 3 patch    And     lidocaine patch REMOVAL    And     lidocaine patch in PLACE     lidocaine (LMX4) cream     lidocaine 1 % 1 mL     magnesium sulfate 2 g in water intermittent infusion     magnesium sulfate 4 g in 100 mL sterile water (premade)     melatonin tablet 3 mg     metoprolol tartrate (LOPRESSOR) tablet 75 mg     mineral oil-hydrophilic petrolatum (AQUAPHOR)     mineral oil-hydrophilic petrolatum (AQUAPHOR)     naloxone (NARCAN) injection 0.1-0.4 mg     niacin (SLO-NIACIN) CR tablet 500 mg     nitroGLYcerin (NITROSTAT) sublingual tablet 0.4 mg     pantoprazole (PROTONIX) EC tablet 40 mg     Patient is already receiving anticoagulation with heparin, enoxaparin (LOVENOX), warfarin (COUMADIN)  or other anticoagulant medication     polyethylene glycol (MIRALAX/GLYCOLAX) Packet 17 g     potassium chloride (KLOR-CON) Packet 20-40 mEq     potassium chloride 10 mEq in 100 mL intermittent infusion with 10 mg lidocaine     potassium chloride 10 mEq in 100 mL sterile water intermittent infusion (premix)     potassium chloride 20 mEq in 50 mL intermittent infusion     potassium chloride SA (K-DUR/KLOR-CON M) CR tablet 20-40 mEq     pregabalin (LYRICA) capsule 100 mg     repaglinide (PRANDIN) tablet 1 mg     senna-docusate (SENOKOT-S;PERICOLACE) 8.6-50 MG per tablet 1-2 tablet     sodium chloride (PF) 0.9% PF flush 10 mL     sodium chloride (PF) 0.9% PF flush 10-20 mL     traZODone (DESYREL) tablet 150 mg       PHYSICAL EXAM:  Vitals: B/P: 143/54, T: 98.2, P: 71, R: 16, weight continues to increase.  Gen: tired, sitting on couch with heat pad on left shoulder.  HEENT: PERRLA, EOMI, normal conjunctivae, moist mucosae, normal speech  Resp: CTAB. Diminished air entry  at bases bilaterally.  CVS: RRR, no murmurs rubs gallops  GI: S NT ND normoactive bowel sounds  MSK: Spontaneously moving all four limbs. Left shoulder ROM limited by pain.  Neuro: AOx3. Grossly intact strength and sensation. Diffuse weakness c/w deconditioning.  Ext: extremity pulses 2+, skin warm and well-perfused. Lymphedema wraps on leg. Sternotomy appears c/d/i. Staples removed from left leg and right leg.    LABS:  CBC RESULTS:   Recent Labs   Lab Test  07/20/18   0534  07/19/18   0709  07/18/18   0740   WBC   --    --   8.6   RBC   --    --   2.93*   HGB  8.3*  9.3*  7.9*   HCT   --   29.7*  26.4*   MCV   --    --   90   MCH   --    --   27.0   MCHC   --    --   29.9*   RDW   --    --   15.6*   PLT   --    --   240     Last Basic Metabolic Panel:  Lab Results   Component Value Date     07/20/2018      Lab Results   Component Value Date    POTASSIUM 3.6 07/20/2018     Lab Results   Component Value Date    CHLORIDE 107 07/20/2018     Lab Results   Component Value Date    ANTOINETTE 8.3 07/20/2018     Lab Results   Component Value Date    CO2 29 07/20/2018     Lab Results   Component Value Date    BUN 43 07/20/2018     Lab Results   Component Value Date    CR 1.50 07/20/2018     Lab Results   Component Value Date     07/20/2018       ASSESSMENT:  Mariel Ribeiro is a 73 year old female with a past medical history of DM2, HTN, CKD3, HLD, COPD, fibromyalgia, anxiety, depression, lymphedema, thyroid cancer s/p resection, and multivessel CAD who presented with with unstable angina. Patient had recurrent chest pain and her CABG was moved to 7/2/2018. On 7/2/2018 she underwent a 3 vessel CABG (LIMA to LAD, SVG to R PDA, and SVG to OMA2).     Rehabilitation - continue comprehensive acute inpatient rehabilitation program with multidisciplinary approach including therapies, rehab nursing, and physiatry following. See interval history for updates. Patient participating reasonably well, but ongoing issues with weight  gain secondary to lack of diuresis.   Medical Conditions  #Coronary artery disease - s/p CABG x3  Patient with significant history of CAD in self and family. She has already received 2 stents in 2007. She is presenting with impairment in functional mobility and tasks. 3 vessel CABG performed to LAD, PDA and OMA2.  -Continue sternal precautions.  -Continue metoprolol 75 BID.  -Continue hydralazine 75 TID.  -Bumex 3 mg IV continued as per hospitalist. Chlorothiazide  mg once.  - mg daily.  -Atorvastatin 40 mg daily.  -Tylenol 975 mg TID  -NTG PRN for angina.  -Hgb trended down but above 8. Likely due to volume overload.     #Left shoulder pain  Likely acute on chronic as she had rotator cuff repair in the distant past on this shoulder. Also, could be worsened from surgical positioning and referred pain.   -Continue APAP, lidocaine patch and cream and light ROM exercises.  -No joint injection at present due to DM and need for fluoroscopy.  -Consider outpatient sports medicine consultation.     #Chronic diastolic heart failure  -IV bumex. PO chlorothiazide once.  -Maintain good BP control (metoprolol and hydralazine ordered). Likely will go home with ACE-I or ARB (stopped in hospital due to increased Cr).  -EF of 55% intraoperatively.  -Cr 1.5. Weight continues to increase. Appreciate medicine recs.  -Obtain echocardiogram  -BNP elevated     #Normocytic anemia:  -Desired Hgb range 8-10. 8.3 this AM. 1 unit pRBCs transfused on 7/18/2018.     #COPD  -Albuterol nebs and inhaler PRN for severe and mild/moderate SOB, respectively.  -Fluticasone-vilanterol 1 puff daily.  -O2 PRN via NC. Sats well on RA now.  -CPAP at night as per RT.     #Hypothyroidism  Distant history of thyroid resection for thyroid cancer (in 1990s) with resultant hypothyroidism. Continue levothyroxine.  -Levothyroxine 150 mcg daily.     #Hypertension  -Continue metoprolol 75 BID.  -Continue hydralazine 75 TID. Will re-evaluate efficacy of  this. BP tends to be labile.  -Bumex and chlorothiazide.  -Likely will start ACE-I or ARB prior to discharge if Cr at baseline. Cr above baseline 1.3-1.4.     #Dyslipidemia  -Atorvastatin 40 mg daily.  -Niacin 500 mg BID.     #Type II diabetes mellitus  Patient with hard to control diabetes both inpatient and outpatient. A1C over 9 PTA. Endocrine consulted and we appreciate recs. Will f/u with endocrine as outpatient for further DM mgmt.  -Continue QID fingersticks.  -High correction dose scale ACTID and QHS.  -Prandin TID replacing CHO counting.  -Insulin isophane human 35 units in the AM and 30 units at PM.  -Consistent CHO diet.  -Lyrica 100 mg BID for neuropathy (and fibromyalgia).     #Chronic kidney disease stage III  -Cr 1.5 today. Baseline (Cr 1.3-1.4). Likely plateau due to pRBCs given.  -Continue IV bumex as per hospitalist. Chlorothiazide PO once.  -PO fluids max 2 L.     #Urinary tract infection, Enterobacter cloacae complex and E.coli  -Continue ciprofloxacin 250 mg BID. Cultures sensitive. Will follow. Last day today.     #Paroxysmal SVT  -Continue metoprolol 75 BID.  -Obtain EKG if symptomatic.     #Gout, hyperuricemia  -Continue febuxostat 40 mg QHS     #Lymphedema  Patient reports this is worse since surgery. This started in 2007 after her two PCIs for CAD.   -Bilateral wraps in place this AM. Re-wrapped by lymphedema specialist.  -Patient had hematoma 2/2 graft site incision. Pain and redness is improving as per patient this AM. No concern for cellulitis at this time. Will monitor.    Adjustment to disability: Continue lexapro 20 mg daily and trazodone 150 mg QHS. Melatonin at night.  FEN: Consistent CHO, thin liquids. Cr stable but above baseline, and K stable. PICC in right arm.   Bowel: Continent. Assistance with cares at times.  Bladder: WFL. UTI on cipro. Last day today.  DVT prophylaxis: Ambulatory  GI prophylaxis: Pantoprazole  Code: Full code  Disposition: Likely home with outpatient  cardiopulmonary rehab services.  Estimated length of stay:  Likely next week pending diuresis. ~7/23.  Rehab prognosis:  Fair  Follow up appointments on discharge: 1) CT surgery 7/24 2) PCP within 2 weeks of discharge 3) Dr. Stephanie Wolf for heart failure follow-up on 8/15/2018 4) Outpatient sports med referral for left shoulder pain 5) Endocrine clinic referral for DM mgmt.  Rafi Crawford MD  PGY-2 PM&R  Pager: [phone]     91

## 2019-04-25 ENCOUNTER — PATIENT OUTREACH (OUTPATIENT)
Dept: CARE COORDINATION | Facility: CLINIC | Age: 74
End: 2019-04-25

## 2019-04-25 ENCOUNTER — APPOINTMENT (OUTPATIENT)
Dept: GENERAL RADIOLOGY | Facility: CLINIC | Age: 74
DRG: 291 | End: 2019-04-25
Attending: EMERGENCY MEDICINE
Payer: MEDICARE

## 2019-04-25 ENCOUNTER — HOSPITAL ENCOUNTER (INPATIENT)
Facility: CLINIC | Age: 74
LOS: 8 days | Discharge: HOME HEALTH | DRG: 291 | End: 2019-05-03
Attending: EMERGENCY MEDICINE | Admitting: INTERNAL MEDICINE
Payer: MEDICARE

## 2019-04-25 ENCOUNTER — TELEPHONE (OUTPATIENT)
Dept: FAMILY MEDICINE | Facility: CLINIC | Age: 74
End: 2019-04-25

## 2019-04-25 ENCOUNTER — APPOINTMENT (OUTPATIENT)
Dept: NUCLEAR MEDICINE | Facility: CLINIC | Age: 74
DRG: 291 | End: 2019-04-25
Attending: EMERGENCY MEDICINE
Payer: MEDICARE

## 2019-04-25 DIAGNOSIS — E03.8 OTHER SPECIFIED HYPOTHYROIDISM: ICD-10-CM

## 2019-04-25 DIAGNOSIS — Z79.4 TYPE 2 DIABETES MELLITUS WITH STAGE 3 CHRONIC KIDNEY DISEASE, WITH LONG-TERM CURRENT USE OF INSULIN (H): Chronic | ICD-10-CM

## 2019-04-25 DIAGNOSIS — M25.512 PAIN IN JOINT OF LEFT SHOULDER: ICD-10-CM

## 2019-04-25 DIAGNOSIS — G89.18 ACUTE POST-OPERATIVE PAIN: ICD-10-CM

## 2019-04-25 DIAGNOSIS — T78.40XD ALLERGIC REACTION, SUBSEQUENT ENCOUNTER: ICD-10-CM

## 2019-04-25 DIAGNOSIS — I50.32 CHRONIC DIASTOLIC HEART FAILURE (H): Chronic | ICD-10-CM

## 2019-04-25 DIAGNOSIS — J44.9 CHRONIC OBSTRUCTIVE PULMONARY DISEASE, UNSPECIFIED COPD TYPE (H): ICD-10-CM

## 2019-04-25 DIAGNOSIS — E11.22 TYPE 2 DIABETES MELLITUS WITH STAGE 3 CHRONIC KIDNEY DISEASE, WITH LONG-TERM CURRENT USE OF INSULIN (H): Chronic | ICD-10-CM

## 2019-04-25 DIAGNOSIS — I25.10 ATHEROSCLEROSIS OF NATIVE CORONARY ARTERY WITHOUT ANGINA PECTORIS, UNSPECIFIED WHETHER NATIVE OR TRANSPLANTED HEART: ICD-10-CM

## 2019-04-25 DIAGNOSIS — Z95.1 S/P CABG X 3: ICD-10-CM

## 2019-04-25 DIAGNOSIS — G47.00 INSOMNIA, UNSPECIFIED TYPE: ICD-10-CM

## 2019-04-25 DIAGNOSIS — F33.41 RECURRENT MAJOR DEPRESSIVE DISORDER, IN PARTIAL REMISSION (H): ICD-10-CM

## 2019-04-25 DIAGNOSIS — I50.1 PULMONARY EDEMA WITH CONGESTIVE HEART FAILURE (H): ICD-10-CM

## 2019-04-25 DIAGNOSIS — D50.8 IRON DEFICIENCY ANEMIA SECONDARY TO INADEQUATE DIETARY IRON INTAKE: ICD-10-CM

## 2019-04-25 DIAGNOSIS — I50.30 (HFPEF) HEART FAILURE WITH PRESERVED EJECTION FRACTION (H): Primary | ICD-10-CM

## 2019-04-25 DIAGNOSIS — R51.9 HEADACHE: Primary | ICD-10-CM

## 2019-04-25 DIAGNOSIS — N18.30 TYPE 2 DIABETES MELLITUS WITH STAGE 3 CHRONIC KIDNEY DISEASE, WITH LONG-TERM CURRENT USE OF INSULIN (H): Chronic | ICD-10-CM

## 2019-04-25 LAB
ANION GAP SERPL CALCULATED.3IONS-SCNC: 4 MMOL/L (ref 3–14)
BASOPHILS # BLD AUTO: 0 10E9/L (ref 0–0.2)
BASOPHILS NFR BLD AUTO: 0.5 %
BUN SERPL-MCNC: 25 MG/DL (ref 7–30)
CALCIUM SERPL-MCNC: 8.8 MG/DL (ref 8.5–10.1)
CHLORIDE SERPL-SCNC: 109 MMOL/L (ref 94–109)
CO2 SERPL-SCNC: 28 MMOL/L (ref 20–32)
CREAT SERPL-MCNC: 1.34 MG/DL (ref 0.52–1.04)
D DIMER PPP FEU-MCNC: 0.8 UG/ML FEU (ref 0–0.5)
DIFFERENTIAL METHOD BLD: ABNORMAL
EOSINOPHIL # BLD AUTO: 0.2 10E9/L (ref 0–0.7)
EOSINOPHIL NFR BLD AUTO: 2.5 %
ERYTHROCYTE [DISTWIDTH] IN BLOOD BY AUTOMATED COUNT: 13.6 % (ref 10–15)
GFR SERPL CREATININE-BSD FRML MDRD: 39 ML/MIN/{1.73_M2}
GLUCOSE BLDC GLUCOMTR-MCNC: 149 MG/DL (ref 70–99)
GLUCOSE BLDC GLUCOMTR-MCNC: 82 MG/DL (ref 70–99)
GLUCOSE SERPL-MCNC: 132 MG/DL (ref 70–99)
HCT VFR BLD AUTO: 37.9 % (ref 35–47)
HGB BLD-MCNC: 11.8 G/DL (ref 11.7–15.7)
IMM GRANULOCYTES # BLD: 0 10E9/L (ref 0–0.4)
IMM GRANULOCYTES NFR BLD: 0.4 %
INTERPRETATION ECG - MUSE: NORMAL
LYMPHOCYTES # BLD AUTO: 1.5 10E9/L (ref 0.8–5.3)
LYMPHOCYTES NFR BLD AUTO: 19.9 %
MCH RBC QN AUTO: 28.7 PG (ref 26.5–33)
MCHC RBC AUTO-ENTMCNC: 31.1 G/DL (ref 31.5–36.5)
MCV RBC AUTO: 92 FL (ref 78–100)
MONOCYTES # BLD AUTO: 0.5 10E9/L (ref 0–1.3)
MONOCYTES NFR BLD AUTO: 7 %
NEUTROPHILS # BLD AUTO: 5.4 10E9/L (ref 1.6–8.3)
NEUTROPHILS NFR BLD AUTO: 69.7 %
NRBC # BLD AUTO: 0 10*3/UL
NRBC BLD AUTO-RTO: 0 /100
NT-PROBNP SERPL-MCNC: 727 PG/ML (ref 0–900)
PLATELET # BLD AUTO: 132 10E9/L (ref 150–450)
POTASSIUM SERPL-SCNC: 4.6 MMOL/L (ref 3.4–5.3)
RBC # BLD AUTO: 4.11 10E12/L (ref 3.8–5.2)
SODIUM SERPL-SCNC: 142 MMOL/L (ref 133–144)
TROPONIN I SERPL-MCNC: <0.015 UG/L (ref 0–0.04)
WBC # BLD AUTO: 7.7 10E9/L (ref 4–11)

## 2019-04-25 PROCEDURE — 21400000 ZZH R&B CCU UMMC

## 2019-04-25 PROCEDURE — 84484 ASSAY OF TROPONIN QUANT: CPT | Performed by: EMERGENCY MEDICINE

## 2019-04-25 PROCEDURE — 93010 ELECTROCARDIOGRAM REPORT: CPT | Mod: Z6 | Performed by: EMERGENCY MEDICINE

## 2019-04-25 PROCEDURE — 25000128 H RX IP 250 OP 636: Performed by: PHYSICIAN ASSISTANT

## 2019-04-25 PROCEDURE — 93005 ELECTROCARDIOGRAM TRACING: CPT

## 2019-04-25 PROCEDURE — A9567 TECHNETIUM TC-99M AEROSOL: HCPCS | Performed by: EMERGENCY MEDICINE

## 2019-04-25 PROCEDURE — 71046 X-RAY EXAM CHEST 2 VIEWS: CPT

## 2019-04-25 PROCEDURE — 85379 FIBRIN DEGRADATION QUANT: CPT | Performed by: EMERGENCY MEDICINE

## 2019-04-25 PROCEDURE — 94640 AIRWAY INHALATION TREATMENT: CPT

## 2019-04-25 PROCEDURE — 99223 1ST HOSP IP/OBS HIGH 75: CPT | Performed by: PHYSICIAN ASSISTANT

## 2019-04-25 PROCEDURE — 25000125 ZZHC RX 250: Performed by: EMERGENCY MEDICINE

## 2019-04-25 PROCEDURE — 83880 ASSAY OF NATRIURETIC PEPTIDE: CPT | Performed by: EMERGENCY MEDICINE

## 2019-04-25 PROCEDURE — 99285 EMERGENCY DEPT VISIT HI MDM: CPT | Mod: 25

## 2019-04-25 PROCEDURE — 85025 COMPLETE CBC W/AUTO DIFF WBC: CPT | Performed by: EMERGENCY MEDICINE

## 2019-04-25 PROCEDURE — 27210210 NM LUNG SCAN VENTILATION AND PERFUSION

## 2019-04-25 PROCEDURE — 34300033 ZZH RX 343: Performed by: EMERGENCY MEDICINE

## 2019-04-25 PROCEDURE — 96374 THER/PROPH/DIAG INJ IV PUSH: CPT

## 2019-04-25 PROCEDURE — 80048 BASIC METABOLIC PNL TOTAL CA: CPT | Performed by: EMERGENCY MEDICINE

## 2019-04-25 PROCEDURE — 00000146 ZZHCL STATISTIC GLUCOSE BY METER IP

## 2019-04-25 PROCEDURE — 99285 EMERGENCY DEPT VISIT HI MDM: CPT | Mod: 25 | Performed by: EMERGENCY MEDICINE

## 2019-04-25 PROCEDURE — A9540 TC99M MAA: HCPCS | Performed by: EMERGENCY MEDICINE

## 2019-04-25 RX ORDER — NITROGLYCERIN 0.4 MG/1
0.4 TABLET SUBLINGUAL EVERY 5 MIN PRN
Status: DISCONTINUED | OUTPATIENT
Start: 2019-04-25 | End: 2019-04-25

## 2019-04-25 RX ORDER — NICOTINE POLACRILEX 4 MG
15-30 LOZENGE BUCCAL
Status: DISCONTINUED | OUTPATIENT
Start: 2019-04-25 | End: 2019-05-03 | Stop reason: HOSPADM

## 2019-04-25 RX ORDER — BISACODYL 5 MG
5 TABLET, DELAYED RELEASE (ENTERIC COATED) ORAL DAILY PRN
Status: DISCONTINUED | OUTPATIENT
Start: 2019-04-25 | End: 2019-05-03 | Stop reason: HOSPADM

## 2019-04-25 RX ORDER — MAGNESIUM SULFATE HEPTAHYDRATE 40 MG/ML
4 INJECTION, SOLUTION INTRAVENOUS EVERY 4 HOURS PRN
Status: DISCONTINUED | OUTPATIENT
Start: 2019-04-25 | End: 2019-05-03 | Stop reason: HOSPADM

## 2019-04-25 RX ORDER — PREGABALIN 100 MG/1
100 CAPSULE ORAL 2 TIMES DAILY
Status: DISCONTINUED | OUTPATIENT
Start: 2019-04-25 | End: 2019-05-03 | Stop reason: HOSPADM

## 2019-04-25 RX ORDER — PROCHLORPERAZINE 25 MG
12.5 SUPPOSITORY, RECTAL RECTAL EVERY 12 HOURS PRN
Status: DISCONTINUED | OUTPATIENT
Start: 2019-04-25 | End: 2019-05-03 | Stop reason: HOSPADM

## 2019-04-25 RX ORDER — ONDANSETRON 4 MG/1
4 TABLET, ORALLY DISINTEGRATING ORAL EVERY 6 HOURS PRN
Status: DISCONTINUED | OUTPATIENT
Start: 2019-04-25 | End: 2019-05-03 | Stop reason: HOSPADM

## 2019-04-25 RX ORDER — ONDANSETRON 2 MG/ML
4 INJECTION INTRAMUSCULAR; INTRAVENOUS EVERY 6 HOURS PRN
Status: DISCONTINUED | OUTPATIENT
Start: 2019-04-25 | End: 2019-05-03 | Stop reason: HOSPADM

## 2019-04-25 RX ORDER — FOLIC ACID 1 MG/1
1 TABLET ORAL DAILY
Status: DISCONTINUED | OUTPATIENT
Start: 2019-04-26 | End: 2019-05-03 | Stop reason: HOSPADM

## 2019-04-25 RX ORDER — BUMETANIDE 0.25 MG/ML
2 INJECTION INTRAMUSCULAR; INTRAVENOUS EVERY 12 HOURS
Status: DISCONTINUED | OUTPATIENT
Start: 2019-04-25 | End: 2019-04-28

## 2019-04-25 RX ORDER — ACETAMINOPHEN 650 MG/1
650 SUPPOSITORY RECTAL EVERY 4 HOURS PRN
Status: DISCONTINUED | OUTPATIENT
Start: 2019-04-25 | End: 2019-04-26

## 2019-04-25 RX ORDER — POTASSIUM CHLORIDE 750 MG/1
20-40 TABLET, EXTENDED RELEASE ORAL
Status: DISCONTINUED | OUTPATIENT
Start: 2019-04-25 | End: 2019-05-03 | Stop reason: HOSPADM

## 2019-04-25 RX ORDER — ESCITALOPRAM OXALATE 20 MG/1
20 TABLET ORAL EVERY MORNING
Status: DISCONTINUED | OUTPATIENT
Start: 2019-04-26 | End: 2019-05-03 | Stop reason: HOSPADM

## 2019-04-25 RX ORDER — NITROGLYCERIN 0.4 MG/1
0.4 TABLET SUBLINGUAL EVERY 5 MIN PRN
Status: DISCONTINUED | OUTPATIENT
Start: 2019-04-25 | End: 2019-05-03 | Stop reason: HOSPADM

## 2019-04-25 RX ORDER — ACETAMINOPHEN 325 MG/1
650 TABLET ORAL EVERY 4 HOURS PRN
Status: DISCONTINUED | OUTPATIENT
Start: 2019-04-25 | End: 2019-04-25

## 2019-04-25 RX ORDER — ALUMINA, MAGNESIA, AND SIMETHICONE 2400; 2400; 240 MG/30ML; MG/30ML; MG/30ML
30 SUSPENSION ORAL EVERY 4 HOURS PRN
Status: DISCONTINUED | OUTPATIENT
Start: 2019-04-25 | End: 2019-05-03 | Stop reason: HOSPADM

## 2019-04-25 RX ORDER — NIACIN 500 MG/1
500 TABLET, EXTENDED RELEASE ORAL DAILY
Status: DISCONTINUED | OUTPATIENT
Start: 2019-04-26 | End: 2019-04-26 | Stop reason: CLARIF

## 2019-04-25 RX ORDER — LEVOTHYROXINE SODIUM 150 UG/1
150 TABLET ORAL DAILY
Status: DISCONTINUED | OUTPATIENT
Start: 2019-04-26 | End: 2019-05-03 | Stop reason: HOSPADM

## 2019-04-25 RX ORDER — ATORVASTATIN CALCIUM 40 MG/1
40 TABLET, FILM COATED ORAL DAILY
Status: DISCONTINUED | OUTPATIENT
Start: 2019-04-26 | End: 2019-05-03 | Stop reason: HOSPADM

## 2019-04-25 RX ORDER — POTASSIUM CHLORIDE 1.5 G/1.58G
20-40 POWDER, FOR SOLUTION ORAL
Status: DISCONTINUED | OUTPATIENT
Start: 2019-04-25 | End: 2019-05-03 | Stop reason: HOSPADM

## 2019-04-25 RX ORDER — POTASSIUM CHLORIDE 29.8 MG/ML
20 INJECTION INTRAVENOUS
Status: DISCONTINUED | OUTPATIENT
Start: 2019-04-25 | End: 2019-05-03 | Stop reason: HOSPADM

## 2019-04-25 RX ORDER — BUMETANIDE 0.25 MG/ML
1 INJECTION INTRAMUSCULAR; INTRAVENOUS ONCE
Status: DISCONTINUED | OUTPATIENT
Start: 2019-04-25 | End: 2019-04-25 | Stop reason: DRUGHIGH

## 2019-04-25 RX ORDER — BISACODYL 5 MG
15 TABLET, DELAYED RELEASE (ENTERIC COATED) ORAL DAILY PRN
Status: DISCONTINUED | OUTPATIENT
Start: 2019-04-25 | End: 2019-05-03 | Stop reason: HOSPADM

## 2019-04-25 RX ORDER — POTASSIUM CL/LIDO/0.9 % NACL 10MEQ/0.1L
10 INTRAVENOUS SOLUTION, PIGGYBACK (ML) INTRAVENOUS
Status: DISCONTINUED | OUTPATIENT
Start: 2019-04-25 | End: 2019-05-03 | Stop reason: HOSPADM

## 2019-04-25 RX ORDER — DEXTROSE MONOHYDRATE 25 G/50ML
25-50 INJECTION, SOLUTION INTRAVENOUS
Status: DISCONTINUED | OUTPATIENT
Start: 2019-04-25 | End: 2019-05-03 | Stop reason: HOSPADM

## 2019-04-25 RX ORDER — ALBUTEROL SULFATE 90 UG/1
1-2 AEROSOL, METERED RESPIRATORY (INHALATION) EVERY 4 HOURS PRN
Status: DISCONTINUED | OUTPATIENT
Start: 2019-04-25 | End: 2019-05-03 | Stop reason: HOSPADM

## 2019-04-25 RX ORDER — FERROUS GLUCONATE 324(38)MG
324 TABLET ORAL
Status: DISCONTINUED | OUTPATIENT
Start: 2019-04-26 | End: 2019-05-03 | Stop reason: HOSPADM

## 2019-04-25 RX ORDER — AMOXICILLIN 250 MG
1 CAPSULE ORAL 2 TIMES DAILY
Status: DISCONTINUED | OUTPATIENT
Start: 2019-04-25 | End: 2019-05-03 | Stop reason: HOSPADM

## 2019-04-25 RX ORDER — ALBUTEROL SULFATE 0.83 MG/ML
2.5 SOLUTION RESPIRATORY (INHALATION) ONCE
Status: COMPLETED | OUTPATIENT
Start: 2019-04-25 | End: 2019-04-25

## 2019-04-25 RX ORDER — ACETAMINOPHEN 325 MG/1
650 TABLET ORAL EVERY 4 HOURS PRN
Status: DISCONTINUED | OUTPATIENT
Start: 2019-04-25 | End: 2019-04-26

## 2019-04-25 RX ORDER — BISACODYL 5 MG
10 TABLET, DELAYED RELEASE (ENTERIC COATED) ORAL DAILY PRN
Status: DISCONTINUED | OUTPATIENT
Start: 2019-04-25 | End: 2019-05-03 | Stop reason: HOSPADM

## 2019-04-25 RX ORDER — AMOXICILLIN 250 MG
2 CAPSULE ORAL 2 TIMES DAILY
Status: DISCONTINUED | OUTPATIENT
Start: 2019-04-25 | End: 2019-05-03 | Stop reason: HOSPADM

## 2019-04-25 RX ORDER — LOSARTAN POTASSIUM 25 MG/1
25 TABLET ORAL DAILY
Status: DISCONTINUED | OUTPATIENT
Start: 2019-04-26 | End: 2019-05-03 | Stop reason: HOSPADM

## 2019-04-25 RX ORDER — METOPROLOL TARTRATE 25 MG/1
75 TABLET, FILM COATED ORAL 2 TIMES DAILY
Status: DISCONTINUED | OUTPATIENT
Start: 2019-04-25 | End: 2019-05-01

## 2019-04-25 RX ORDER — LIDOCAINE 40 MG/G
CREAM TOPICAL
Status: DISCONTINUED | OUTPATIENT
Start: 2019-04-25 | End: 2019-05-03 | Stop reason: HOSPADM

## 2019-04-25 RX ORDER — POTASSIUM CHLORIDE 7.45 MG/ML
10 INJECTION INTRAVENOUS
Status: DISCONTINUED | OUTPATIENT
Start: 2019-04-25 | End: 2019-05-03 | Stop reason: HOSPADM

## 2019-04-25 RX ORDER — PROCHLORPERAZINE MALEATE 5 MG
5 TABLET ORAL EVERY 6 HOURS PRN
Status: DISCONTINUED | OUTPATIENT
Start: 2019-04-25 | End: 2019-05-03 | Stop reason: HOSPADM

## 2019-04-25 RX ORDER — ASPIRIN 81 MG/1
81 TABLET, CHEWABLE ORAL DAILY
Status: DISCONTINUED | OUTPATIENT
Start: 2019-04-26 | End: 2019-05-03 | Stop reason: HOSPADM

## 2019-04-25 RX ADMIN — ALBUTEROL SULFATE 2.5 MG: 2.5 SOLUTION RESPIRATORY (INHALATION) at 17:49

## 2019-04-25 RX ADMIN — KIT FOR THE PREPARATION OF TECHNETIUM TC 99M PENTETATE 2 MILLICURIE: 20 INJECTION, POWDER, LYOPHILIZED, FOR SOLUTION INTRAVENOUS; RESPIRATORY (INHALATION) at 19:01

## 2019-04-25 RX ADMIN — Medication 7 MILLICURIE: at 19:01

## 2019-04-25 RX ADMIN — BUMETANIDE 2 MG: 0.25 INJECTION INTRAMUSCULAR; INTRAVENOUS at 17:46

## 2019-04-25 ASSESSMENT — ACTIVITIES OF DAILY LIVING (ADL)
TRANSFERRING: 1-->ASSISTIVE EQUIPMENT
WHICH_OF_THE_ABOVE_FUNCTIONAL_RISKS_HAD_A_RECENT_ONSET_OR_CHANGE?: AMBULATION;FALL HISTORY
FALL_HISTORY_WITHIN_LAST_SIX_MONTHS: YES
RETIRED_EATING: 0-->INDEPENDENT
RETIRED_COMMUNICATION: 0-->UNDERSTANDS/COMMUNICATES WITHOUT DIFFICULTY
SWALLOWING: 2-->DIFFICULTY SWALLOWING LIQUIDS
DEPENDENT_IADLS:: INDEPENDENT
DRESS: 0-->INDEPENDENT
TOILETING: 1-->ASSISTIVE EQUIPMENT
NUMBER_OF_TIMES_PATIENT_HAS_FALLEN_WITHIN_LAST_SIX_MONTHS: 4
COGNITION: 0 - NO COGNITION ISSUES REPORTED
AMBULATION: 1-->ASSISTIVE EQUIPMENT
BATHING: 1-->ASSISTIVE EQUIPMENT

## 2019-04-25 ASSESSMENT — ENCOUNTER SYMPTOMS
SHORTNESS OF BREATH: 1
FEVER: 0
COUGH: 1
MYALGIAS: 1

## 2019-04-25 ASSESSMENT — MIFFLIN-ST. JEOR: SCORE: 2071.48
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
## 2019-04-25 NOTE — LETTER
Health Information Management Services               Recipient:    Lori Weaver      Sender:    Jill (covering for [employer]) [phone]      Date: May 1, 2019  Patient Name:  Mariel Ribeiro  Routing Message:      MD notified late today that pt agreeable to TCU. Ready to discharge tomorrow. Re-sending referral for your review, thank you!      The documents accompanying this notice contain confidential information belonging to the sender.  This information is intended only for the use of the individual or entity named above.  The authorized recipient of this information is prohibited from disclosing this information to any other party and is required to destroy the information after its stated need has been fulfilled, unless otherwise required by state law.      If you are not the intended recipient, you are hereby notified that any disclosure, copying, distribution or action taken in reliance on the contents of these documents is strictly prohibited. If you have received this document in error, please notify [place] immediately at [phone].  You may return the document via fax ([phone]) or return mail  (Health Information Management, , [address] [place] [address]).

## 2019-04-25 NOTE — TELEPHONE ENCOUNTER
Mariel Ribeiro is a 73 year old female who calls with SOB .multiple risk factors and health problems.     NURSING ASSESSMENT:  Description:  Does have history of COPD but said SOB has gotten worse over last couple days and now cannot walk 10 feet without being short of breath.  Even feeling some dizziness bumping her hand into furniture (bruised)  She does not have chest pain.    Onset/duration:  SOB worse since yesterday but started earlier than that.   Precip. factors:  Cough. Using her proair. Not helping much. CPAP uses at night.   Associated symptoms:  Dizzy so she said unable to drive to clinic.  Improves/worsens symptoms: activity and walking short distance makes sx much worse.   Pain scale (0-10)  LMP/preg/breast feeding:  NA  Last exam/Treatment: 4/10/19  Allergies:   Allergies   Allergen Reactions     Contrast Dye Anaphylaxis     Fish Allergy Anaphylaxis     Iodine Anaphylaxis     Oxycodone Other (See Comments)     Severe suicidal tendencies on this medication     Tree Nuts [Nuts] Anaphylaxis     Tree nuts only (peanuts ok)     Albuterol Other (See Comments)     Sandrita-oral erythema  Confirmed 7/3/18 that patient uses albuterol inhalers at home. Patient had her home inhaler in her bag.     Bactrim      Increased uric acid     Betadine [Povidone Iodine] Hives, Swelling and Difficulty breathing     Betadine     Combivent      Rash     Dulaglutide Other (See Comments)     Hx . Of thyroid cancer.     Fish      Lisinopril Other (See Comments)     Scr/grf severely reduced.      Penicillins Rash     Allopurinol Rash     Latex Rash     Wool Fiber Rash       MEDICATIONS:   Taking medication(s) as prescribed? Yes  Taking over the counter medication(s?)  Any medication side effects? No significant side effects    Any barriers to taking medication(s) as prescribed?  No  Medication(s) improving/managing symptoms?  No  Medication reconciliation completed: No      NURSING PLAN: Nursing advice to patient to go to ED via  ambulance. unable to drive due to extreme SOB.     RECOMMENDED DISPOSITION:  Call 911 - nurse offered to call 911 to her home as patient was feeling such SOB that this would help her conserve her energy. Done   Will comply with recommendation: Yes  If further questions/concerns or if symptoms do not improve, worsen or new symptoms develop, call your PCP or [facility] [facility] as soon as possible.          Monica Gonzalez RN
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
## 2019-04-25 NOTE — LETTER
Health Information Management Services               Recipient:  Josef Zamora  PH: [phone]  F: [phone]        Sender:  Cheyanne Laird SAMANTHA  [address]   Phone: [phone]  Pager: [phone]  Unit: [phone]          Date: May 3, 2019  Patient Name:  Mariel Ribeiro  Routing Message:  Referral for placement. Pt is ready today if a bed is available.  Thank you, Cheyanne          The documents accompanying this notice contain confidential information belonging to the sender.  This information is intended only for the use of the individual or entity named above.  The authorized recipient of this information is prohibited from disclosing this information to any other party and is required to destroy the information after its stated need has been fulfilled, unless otherwise required by state law.      If you are not the intended recipient, you are hereby notified that any disclosure, copy, distribution or action taken in reliance on the contents of these documents is strictly prohibited.  If you have received this document in error, please return it by fax to [phone] with a note on the cover sheet explaining why you are returning it (e.g. not your patient, not your provider, etc.).  If you need further assistance, please call [facility] Centralized Transcription at [phone].  Documents may also be returned by mail to [employer], , [facility] [address] [place], [place] [address].

## 2019-04-25 NOTE — PHARMACY-ADMISSION MEDICATION HISTORY
Admission Medication History status for the 2019 admission is complete.  See EPIC admission navigator for Prior to Admission medications.  Patient's Name: Mariel Ribeiro  Patient's : 1945   73 year old, female    Medication history sources:  Patient, Odalis (Roommate)  Primary Pharmacy: [id number] - [place], [place] - [address] AT [facility] [address]  PDMP Check: No    Medication history source reliability: Good  Medication adherence:  Good    Changes made to PTA medication list (reason)  Added: None  Deleted: Lifitegrast eye drops (Patient has not taken in 2 weeks, plans to follow up with PCP if she is to continue this medication), Prednisone (Patient completed therapy)  Changed: Repaglinide (Updates instructions to reflect taking 3 times per day with meals as opposed to just with meals), Folic Acid (Pt reports taking two 1mg tablets daily instead of one)    Notable Drug-Drug Interactions:     Levothyroxine & Iron Supplements: Coadministration leads to decrease in levothyroxine absorption     Losartan and Potassium supplements: Can have additive effects and lead to hyperkalemia     Additional medication history information (including reliability of information, actions taken by pharmacist): None    Time spent in this activity: 25 minute    Medication history completed by:  Carito DallasD     Prior to Admission medications    Medication Sig Last Dose Taking? Auth Provider   acetaminophen (TYLENOL) 325 MG tablet Take 2 tablets (650 mg) by mouth every 4 hours as needed for mild pain 2019 at AM Yes Kb Jeffries PA-C   albuterol (PROAIR HFA) 108 (90 Base) MCG/ACT inhaler Inhale 1-2 puffs into the lungs every 4 hours as needed for wheezing 2019 at AM Yes Rosina Blount NP   aspirin  MG tablet Take 1 tablet by mouth daily. 2019 at PM Yes Dakota Shankar MD   atorvastatin (LIPITOR) 40 MG tablet Take 1 tablet (40 mg) by mouth daily  4/24/2019 at PM Yes Yana Hameed MD   blood glucose (NO BRAND SPECIFIED) test strip 1 strip by In Vitro route 4 times daily Strips per patient's preference 4/24/2019 at Unknown time Yes Rafaela William MD   blood glucose monitoring (NO BRAND SPECIFIED) meter device kit Use to test blood sugar four times daily or as directed. 4/24/2019 at Unknown time Yes Rafaela William MD   bumetanide (BUMEX) 2 MG tablet Take 1 tablet (2 mg) by mouth every 48 hours May take 1mg extra as directed by [facility] clinic. 4/24/2019 at Unknown time Yes Starla Saez NP   escitalopram (LEXAPRO) 20 MG tablet Take 1 tablet (20 mg) by mouth every morning 4/24/2019 at AM Yes Yana Hameed MD   ferrous gluconate (FERGON) 324 (38 Fe) MG tablet Take 1 tablet (324 mg) by mouth Every Mon, Wed, Fri Morning 4/24/2019 at AM Yes Rafaela William MD   FOLIC ACID PO Take 2 mg by mouth daily  4/24/2019 at Unknown time Yes Reported, Patient   guaiFENesin (MUCINEX) 600 MG 12 hr tablet Take 1 tablet (600 mg) by mouth 2 times daily 4/24/2019 at PM Yes Meet Pan MD   insulin isophane human (HUMULIN N KWIKPEN) 100 UNIT/ML injection Inject 28 units subcutaneous each am and each pm. On rehab days, 8 units in NPH and 28 units in the PM 4/25/2019 at 0845 Yes Verna Simental MD   levothyroxine (SYNTHROID/LEVOTHROID) 150 MCG tablet Take 1 tablet (150 mcg) by mouth daily 4/24/2019 at AM Yes Rosina Blount NP   Wunder[employer]CAN FINEPOINT LANCETS MISC Use to test blood sugars 2 times daily or as directed. 4/24/2019 at Unknown time Yes Rafaela William MD   losartan (COZAAR) 50 MG tablet Take 1 tablet (50 mg) by mouth every morning AND 0.5 tablets (25 mg) every evening. 4/24/2019 at AM Yes Stephanie Wolf MD   Metoprolol Tartrate 75 MG TABS Take 75 mg by mouth 2 times daily 4/24/2019 at PM Yes Rafaela William MD   niacin (NIASPAN) 500 MG CR tablet Take 500 mg by mouth daily  4/24/2019 at PM Yes Reported, Patient   ONE TOUCH  ULTRA (DEVICES) MISC test blood sugar BID 4/24/2019 at Unknown time Yes Rosina Blount NP   potassium chloride ER (K-DUR/KLOR-CON M) 10 MEQ CR tablet Take 10 mEq Tuesday, Thurs, Sat, Sun. Take 20 MeQ Monday, Wednesday, Friday. 4/24/2019 at PM Yes Starla Saez NP   pregabalin (LYRICA) 100 MG capsule Take 1 capsule (100 mg) by mouth 2 times daily 4/24/2019 at Unknown time Yes Rafaela William MD   repaglinide (PRANDIN) 1 MG tablet Take 1 mg by mouth 3 times daily (before meals) Hold Prandin in the am and at noon on days you have rehab. 4/24/2019 at AM Yes Odalis Medley PA-C   traZODone (DESYREL) 50 MG tablet TAKE 3 TABLETS(150 MG) BY MOUTH AT BEDTIME 4/24/2019 at PM Yes Rafaela William MD   VITAMIN D, CHOLECALCIFEROL, PO Take 10,000 Units by mouth daily 4/24/2019 at AM Yes Reported, Patient   EPINEPHrine (EPIPEN/ADRENACLICK/OR ANY BX GENERIC EQUIV) 0.3 MG/0.3ML injection 2-pack Inject 0.3 mLs (0.3 mg) into the muscle once as needed for anaphylaxis More than a month at Unknown time  Rafaela William MD   nitroGLYcerin (NITROSTAT) 0.4 MG sublingual tablet For chest pain place 1 tablet under the tongue every 5 minutes for 3 doses. If symptoms persist 5 minutes after 1st dose call 911. Unknown at Unknown time  Yana Hameed MD   senna-docusate (SENOKOT-S;PERICOLACE) 8.6-50 MG per tablet Take 1-2 tablets by mouth 2 times daily as needed for constipation Unknown at Unknown time  Yana Hameed MD

## 2019-04-25 NOTE — PROGRESS NOTES
Clinic Care Coordination Contact    Clinic Care Coordination Contact  OUTREACH    Referral Information:  Referral Source: ED Follow-Up    Primary Diagnosis: Neurological Disorders(weakness low blood sugar )    Chief Complaint   Patient presents with     Clinic Care Coordination - Follow-up     Clinic Care Coordination RN        Universal Utilization: ED visit 4/9/2019  Headache for 13 days   Clinic Utilization  Difficulty keeping appointments:: No  Compliance Concerns: No  No PCP office visit in Past Year: No  Utilization    Last refreshed: 4/24/2019  3:19 AM:  Hospital Admissions 4           Last refreshed: 4/24/2019  3:19 AM:  ED Visits 4           Last refreshed: 4/24/2019  3:19 AM:  No Show Count (past year) 7              Current as of: 4/24/2019  3:19 AM              Clinical Concerns:  Current Medical Concerns:  Patient reports she received Toradol injection at 4/10 PCP visit and no further episodes of headache since injection  Patient complains of increased SOB for the last 3 days. Denies a cough  Patient transferred to the Triage and advised to go to ED  Pain  Pain (GOAL):: Yes  Location of chronic pain:: Back Fibermyalgia  Progression: Unchanged  Health Maintenance Reviewed:    Clinical Pathway: None    Medication Management:  Not dicussed today      Functional Status:  Dependent IADLs:: Independent  Bed or wheelchair confined:: No    Living Situation:  Lives with her room mate        Diet/Exercise/Sleep:  Food Insecurity: No  Tube Feeding: No  Exercise:: Unable to exercise  Inadequate activity/exercise (GOAL):: No  Significant changes in sleep pattern (GOAL): No    Transportation:  Transportation concerns (GOAL):: No  Transportation means:: Family       Supplies used at home:: Nebulizer tubing       Advance Care Plan/Directive  Type Advanced Care Plans/Directives: Advanced Directive - On File          Goals:   Goals        General    # 2 Pain Management (pt-stated)     Notes - Note edited  4/10/2019 10:20  AM by Senia Durán, JODY    Goal Statement: I will decrease back pain   Measure of Success: Increased mobility with daily activity   Supportive Steps to Achieve:   I will start taking Tylenol 500 mg 3 times a day per Dr William  I will take Lyrica as directed    I will use heat ice and rest   I will use my cane for safety  Barriers: Deconditioned   Strengths: Continues PT OT   Date to Achieve By: 5/10/2019  Patient expressed understanding of goal: yes        # 3 Medical (pt-stated)     Notes - Note edited  4/10/2019 10:21 AM by Senia Durán, RN    Goal Statement:I will prevent a future fall   Measure of Success: No falls  Supportive Steps to Achieve:  I will use cane or walker at all times   I will use lifeline when I go to the bathroom because walker doesn't fit   Barriers:Lightheaded /Dizzy leg weakness   Strengths: outpatient PT/OT 3/6  Date to Achieve By: 5/10/2019  Patient expressed understanding of goal: Yes        #1 Medical (pt-stated)     Notes - Note edited  4/25/2019  1:14 PM by Senia Durán, RN    Goal Statement: I will monitor shortness of breath episodes   Measure of Success: More energy for daily activity   Supportive Steps to Achieve:   I will seek medical help if experiencing increased shortness of breath episodes   I will follow  COPD Action Plan   Barriers: None identified   Strengths: supportive room mate   Date to Achieve By 5/25/2019  Patient expressed understanding of goal: Yes               Outreach Frequency: weekly  Future Appointments              In 4 days  LAB  Health Lab, [facility]    In 4 days Starla Saez NP [facility] Heart Care, [facility]    [facility]    In 6 days Omar Greene, OT [facility], Occupational Therapy - Outpatient, [place]    In 6 days Digna Hayden, PT [facility], Physical Therapy - Outpatient, [place]    In 1 week Digna Hayden, PT [facility], Physical Therapy - Outpatient,  [place]    In 1 week Omar Greene, ANGELY [facility], [place], Occupational Therapy - Outpatient, [place]    In 2 months  LAB  Health Lab, [facility]    In 2 months Stephanie Wolf MD [facility] Heart Care, [facility]    In 3 months Odalis Medley PA-C [facility] Endocrinology, [facility]          Plan: CC transferred to the clinic triage due to increased SOB in the last 3 days.  Triage sent the patient to the ED  CC will follow up when discharged from the hospital     Senia Durán RN, Care Coordinator   [facility] -Care Coordination  Doug Christine

## 2019-04-26 ENCOUNTER — APPOINTMENT (OUTPATIENT)
Dept: CARDIOLOGY | Facility: CLINIC | Age: 74
DRG: 291 | End: 2019-04-26
Attending: PHYSICIAN ASSISTANT
Payer: MEDICARE

## 2019-04-26 LAB
ANION GAP SERPL CALCULATED.3IONS-SCNC: 4 MMOL/L (ref 3–14)
ANION GAP SERPL CALCULATED.3IONS-SCNC: 7 MMOL/L (ref 3–14)
BUN SERPL-MCNC: 24 MG/DL (ref 7–30)
BUN SERPL-MCNC: 25 MG/DL (ref 7–30)
CALCIUM SERPL-MCNC: 9.4 MG/DL (ref 8.5–10.1)
CALCIUM SERPL-MCNC: 9.5 MG/DL (ref 8.5–10.1)
CHLORIDE SERPL-SCNC: 106 MMOL/L (ref 94–109)
CHLORIDE SERPL-SCNC: 109 MMOL/L (ref 94–109)
CO2 SERPL-SCNC: 25 MMOL/L (ref 20–32)
CO2 SERPL-SCNC: 31 MMOL/L (ref 20–32)
CREAT SERPL-MCNC: 1.42 MG/DL (ref 0.52–1.04)
CREAT SERPL-MCNC: 1.44 MG/DL (ref 0.52–1.04)
ERYTHROCYTE [DISTWIDTH] IN BLOOD BY AUTOMATED COUNT: 13.7 % (ref 10–15)
GFR SERPL CREATININE-BSD FRML MDRD: 36 ML/MIN/{1.73_M2}
GFR SERPL CREATININE-BSD FRML MDRD: 37 ML/MIN/{1.73_M2}
GLUCOSE BLDC GLUCOMTR-MCNC: 114 MG/DL (ref 70–99)
GLUCOSE BLDC GLUCOMTR-MCNC: 124 MG/DL (ref 70–99)
GLUCOSE BLDC GLUCOMTR-MCNC: 189 MG/DL (ref 70–99)
GLUCOSE BLDC GLUCOMTR-MCNC: 99 MG/DL (ref 70–99)
GLUCOSE SERPL-MCNC: 111 MG/DL (ref 70–99)
GLUCOSE SERPL-MCNC: 123 MG/DL (ref 70–99)
HCT VFR BLD AUTO: 40.4 % (ref 35–47)
HGB BLD-MCNC: 12.2 G/DL (ref 11.7–15.7)
MCH RBC QN AUTO: 28.1 PG (ref 26.5–33)
MCHC RBC AUTO-ENTMCNC: 30.2 G/DL (ref 31.5–36.5)
MCV RBC AUTO: 93 FL (ref 78–100)
PLATELET # BLD AUTO: 136 10E9/L (ref 150–450)
POTASSIUM SERPL-SCNC: 4.2 MMOL/L (ref 3.4–5.3)
POTASSIUM SERPL-SCNC: 4.3 MMOL/L (ref 3.4–5.3)
RBC # BLD AUTO: 4.34 10E12/L (ref 3.8–5.2)
SODIUM SERPL-SCNC: 141 MMOL/L (ref 133–144)
SODIUM SERPL-SCNC: 141 MMOL/L (ref 133–144)
WBC # BLD AUTO: 7.4 10E9/L (ref 4–11)

## 2019-04-26 PROCEDURE — 25000132 ZZH RX MED GY IP 250 OP 250 PS 637: Performed by: INTERNAL MEDICINE

## 2019-04-26 PROCEDURE — 25000132 ZZH RX MED GY IP 250 OP 250 PS 637: Performed by: STUDENT IN AN ORGANIZED HEALTH CARE EDUCATION/TRAINING PROGRAM

## 2019-04-26 PROCEDURE — 40000264 ECHOCARDIOGRAM COMPLETE

## 2019-04-26 PROCEDURE — 21400000 ZZH R&B CCU UMMC

## 2019-04-26 PROCEDURE — 25000128 H RX IP 250 OP 636: Performed by: PHYSICIAN ASSISTANT

## 2019-04-26 PROCEDURE — 85027 COMPLETE CBC AUTOMATED: CPT | Performed by: PHYSICIAN ASSISTANT

## 2019-04-26 PROCEDURE — A9270 NON-COVERED ITEM OR SERVICE: HCPCS | Performed by: STUDENT IN AN ORGANIZED HEALTH CARE EDUCATION/TRAINING PROGRAM

## 2019-04-26 PROCEDURE — A9270 NON-COVERED ITEM OR SERVICE: HCPCS | Performed by: PHYSICIAN ASSISTANT

## 2019-04-26 PROCEDURE — 00000146 ZZHCL STATISTIC GLUCOSE BY METER IP

## 2019-04-26 PROCEDURE — 36415 COLL VENOUS BLD VENIPUNCTURE: CPT | Performed by: PHYSICIAN ASSISTANT

## 2019-04-26 PROCEDURE — 25000132 ZZH RX MED GY IP 250 OP 250 PS 637: Performed by: PHYSICIAN ASSISTANT

## 2019-04-26 PROCEDURE — 25500064 ZZH RX 255 OP 636: Performed by: INTERNAL MEDICINE

## 2019-04-26 PROCEDURE — 80048 BASIC METABOLIC PNL TOTAL CA: CPT | Performed by: PHYSICIAN ASSISTANT

## 2019-04-26 PROCEDURE — A9270 NON-COVERED ITEM OR SERVICE: HCPCS | Performed by: INTERNAL MEDICINE

## 2019-04-26 PROCEDURE — 99233 SBSQ HOSP IP/OBS HIGH 50: CPT | Mod: 25 | Performed by: PHYSICIAN ASSISTANT

## 2019-04-26 PROCEDURE — 40000275 ZZH STATISTIC RCP TIME EA 10 MIN

## 2019-04-26 PROCEDURE — 93306 TTE W/DOPPLER COMPLETE: CPT | Mod: 26 | Performed by: INTERNAL MEDICINE

## 2019-04-26 PROCEDURE — 40000556 ZZH STATISTIC PERIPHERAL IV START W US GUIDANCE

## 2019-04-26 RX ORDER — METOLAZONE 2.5 MG/1
2.5 TABLET ORAL ONCE
Status: DISCONTINUED | OUTPATIENT
Start: 2019-04-26 | End: 2019-04-26

## 2019-04-26 RX ORDER — LOSARTAN POTASSIUM 50 MG/1
50 TABLET ORAL AT BEDTIME
Status: DISCONTINUED | OUTPATIENT
Start: 2019-04-26 | End: 2019-05-03 | Stop reason: HOSPADM

## 2019-04-26 RX ORDER — LANOLIN ALCOHOL/MO/W.PET/CERES
500 CREAM (GRAM) TOPICAL DAILY
Status: DISCONTINUED | OUTPATIENT
Start: 2019-04-26 | End: 2019-05-03 | Stop reason: HOSPADM

## 2019-04-26 RX ORDER — ACETAMINOPHEN 325 MG/1
975 TABLET ORAL EVERY 4 HOURS PRN
Status: DISCONTINUED | OUTPATIENT
Start: 2019-04-26 | End: 2019-05-03 | Stop reason: HOSPADM

## 2019-04-26 RX ORDER — ACETAMINOPHEN 650 MG/1
975 SUPPOSITORY RECTAL EVERY 4 HOURS PRN
Status: DISCONTINUED | OUTPATIENT
Start: 2019-04-26 | End: 2019-05-03 | Stop reason: HOSPADM

## 2019-04-26 RX ORDER — METOLAZONE 5 MG/1
5 TABLET ORAL ONCE
Status: COMPLETED | OUTPATIENT
Start: 2019-04-26 | End: 2019-04-26

## 2019-04-26 RX ADMIN — FERROUS GLUCONATE 324 MG: 324 TABLET ORAL at 09:09

## 2019-04-26 RX ADMIN — ATORVASTATIN CALCIUM 40 MG: 40 TABLET, FILM COATED ORAL at 20:40

## 2019-04-26 RX ADMIN — NIACIN ER 500 MG TABLET,EXTENDED RELEASE 500 MG: at 09:14

## 2019-04-26 RX ADMIN — FOLIC ACID 1 MG: 1 TABLET ORAL at 09:11

## 2019-04-26 RX ADMIN — PREGABALIN 100 MG: 100 CAPSULE ORAL at 09:20

## 2019-04-26 RX ADMIN — LOSARTAN POTASSIUM 25 MG: 25 TABLET, FILM COATED ORAL at 09:09

## 2019-04-26 RX ADMIN — BUMETANIDE 2 MG: 0.25 INJECTION INTRAMUSCULAR; INTRAVENOUS at 18:51

## 2019-04-26 RX ADMIN — ESCITALOPRAM 20 MG: 20 TABLET, FILM COATED ORAL at 09:09

## 2019-04-26 RX ADMIN — ACETAMINOPHEN 975 MG: 325 TABLET, FILM COATED ORAL at 21:34

## 2019-04-26 RX ADMIN — TRAZODONE HYDROCHLORIDE 150 MG: 100 TABLET ORAL at 00:22

## 2019-04-26 RX ADMIN — METOLAZONE 5 MG: 5 TABLET ORAL at 11:38

## 2019-04-26 RX ADMIN — ASPIRIN 81 MG CHEWABLE TABLET 81 MG: 81 TABLET CHEWABLE at 09:12

## 2019-04-26 RX ADMIN — METOPROLOL TARTRATE 75 MG: 25 TABLET ORAL at 20:40

## 2019-04-26 RX ADMIN — LOSARTAN POTASSIUM 50 MG: 50 TABLET ORAL at 21:37

## 2019-04-26 RX ADMIN — METOPROLOL TARTRATE 75 MG: 25 TABLET ORAL at 00:21

## 2019-04-26 RX ADMIN — METOPROLOL TARTRATE 75 MG: 25 TABLET ORAL at 09:08

## 2019-04-26 RX ADMIN — HUMAN ALBUMIN MICROSPHERES AND PERFLUTREN 6 ML: 10; .22 INJECTION, SOLUTION INTRAVENOUS at 10:45

## 2019-04-26 RX ADMIN — LEVOTHYROXINE SODIUM 150 MCG: 150 TABLET ORAL at 09:12

## 2019-04-26 RX ADMIN — TRAZODONE HYDROCHLORIDE 150 MG: 100 TABLET ORAL at 21:36

## 2019-04-26 RX ADMIN — PREGABALIN 100 MG: 100 CAPSULE ORAL at 00:23

## 2019-04-26 RX ADMIN — BUMETANIDE 2 MG: 0.25 INJECTION INTRAMUSCULAR; INTRAVENOUS at 05:19

## 2019-04-26 RX ADMIN — SENNOSIDES AND DOCUSATE SODIUM 1 TABLET: 8.6; 5 TABLET ORAL at 09:10

## 2019-04-26 RX ADMIN — PREGABALIN 100 MG: 100 CAPSULE ORAL at 20:40

## 2019-04-26 ASSESSMENT — ACTIVITIES OF DAILY LIVING (ADL)
ADLS_ACUITY_SCORE: 20
ADLS_ACUITY_SCORE: 23
ADLS_ACUITY_SCORE: 19
ADLS_ACUITY_SCORE: 19
ADLS_ACUITY_SCORE: 18
ADLS_ACUITY_SCORE: 18

## 2019-04-26 ASSESSMENT — MIFFLIN-ST. JEOR: SCORE: 2045.62

## 2019-04-26 NOTE — PLAN OF CARE
"/75   Pulse 73   Temp 97.9  F (36.6  C) (Oral)   Resp 16   Ht 1.689 m (5' 6.5\")   Wt (!) 153.7 kg (338 lb 12.8 oz)   SpO2 100%   BMI 53.86 kg/m      Admission Diagnosis: Heart Failure  Admitted from: ED via litter  Belongings: kept with patient  Admission Profile: Complete  Teaching: Oriented to unit and process.   Access: PIV.   Telemetry: In place  Ht/Wt: See flow sheets.   No pressure injuries present. Does have chronic wound on L big toe. Most recent falls 2 days ago.  Will continue POC.     "

## 2019-04-26 NOTE — PROGRESS NOTES
"  Cardiology Progress Note  Subjective:  NO acute events overnight   1.2 L UOP since yesterday, creat stable  Requiring 2LPM via NC  No complaints of chest pain, light headedness, dizziness, syncope    Plan:   -IV Diuresis with Bumex, titrating to 2-3 L/24 hours   -Metolazone today once   -TTE today     Objective:  Most recent vital signs:  /69   Pulse 73   Temp 97.9  F (36.6  C) (Oral)   Resp 18   Ht 1.689 m (5' 6.5\")   Wt (!) 151.1 kg (333 lb 1.6 oz)   SpO2 96%   BMI 52.96 kg/m    Temp:  [97.6  F (36.4  C)-97.9  F (36.6  C)] 97.9  F (36.6  C)  Pulse:  [52-73] 73  Heart Rate:  [55-83] 74  Resp:  [12-30] 18  BP: (136-186)/(56-75) 156/69  SpO2:  [96 %-100 %] 96 %  Wt Readings from Last 2 Encounters:   04/26/19 (!) 151.1 kg (333 lb 1.6 oz)   04/09/19 149.7 kg (330 lb)       Intake/Output Summary (Last 24 hours) at 4/26/2019 0728  Last data filed at 4/26/2019 0600  Gross per 24 hour   Intake 60 ml   Output 1200 ml   Net -1140 ml     Physical exam:  General: In bed, in NAD  HEENT: EOMI, PERRLA, no scleral icterus or injection  CARDIAC: RRR, no m/r/g appreciated. Peripheral pulses 2+  RESP: distant exam due to habitus, crackles in bases, no rhonchi  appreciated.  GI: NABS, NT/ND, no guarding or rebound  EXTREMITIES: Legs are soft with 1+ edema, abdominal edema, pulses 2+.   SKIN: No acute lesions appreciated  NEURO: Alert and oriented X3, CN II-XII grossly intact, no focal neurological deficits noted    Labs (Past three days):  CBC  Recent Labs   Lab 04/26/19  0528 04/25/19  1400   WBC 7.4 7.7   RBC 4.34 4.11   HGB 12.2 11.8   HCT 40.4 37.9   MCV 93 92   MCH 28.1 28.7   MCHC 30.2* 31.1*   RDW 13.7 13.6   * 132*     BMP  Recent Labs   Lab 04/26/19  0528 04/25/19  1400    142   POTASSIUM 4.3 4.6   CHLORIDE 109 109   CO2 25 28   ANIONGAP 7 4   * 132*   BUN 24 25   CR 1.42* 1.34*   GFRESTIMATED 37* 39*   GFRESTBLACK 42* 45*   ANTOINETTE 9.4 8.8       INRNo lab results found in last 7 days.  Liver " panelNo lab results found in last 7 days.    Imaging/procedure results:  CXR  1. Worsening pulmonary vascular congestion, likely representing  pulmonary edema.   2. Enlarged cardiac silhouette.    VQ scan Impression: Negative for pulmonary embolism.       Assessment & Plan:  ASSESSMENT AND PLAN:   73 year old with a PMHx of CAD s/p PCI and CABG in 2018, DM2, HLD, CKD Stage III, COPD, and HFpEF. She is admitted with shortness of breath and weight gain. She is found to have pulmonary vascular congestion on a CXR. EDW close to or below 300 lbs, presently closer to 330 lbs.      HFpEF   Fluid Overload   Shortness of breath   Patient presents with progressively worsening SOB and orthopnea, weight gain of approximately 30 lbs, and edema in her lower extremities/abdomen. CXR remarkable for pulmonary edema. She has had admissions for HFpEF and fluid overload following her CABG in 2018. She has dietary indiscretion and non-compliance using bumex 2mg q48. No other obvious etiology for her shortness of breath. NO EKG changes and trop negative so low suspicion for ACS though we will get an echo. No evidence of PNA such as leukocytosis or focal consolidation on CXR. A VQ scan was negative for PE and the progress of her SOB has been insidious rather than abrupt.    -Bumex 2 mg IV BID, titrate to UOP goals   -Metolazone 5 mg PO once today  -Goal UOP 2-3 L/24 hours   -I/O, daily weights  -Salt restrict, fluid restrict   -BID BMP, lytes replacement   -TTE today, pending  -Lymphedema consult   -PT/OT/CORE     CAD status post CABG   HLD    Patient with history of multiple stents and CABG (LAD, PDA, OM) July of 2018, prolonged hospitalization due to weakness.   -Aspirin 81 mg daily   -Lipitor 40 mg daily   -Metoprolol 75 mg BID   -Naicin      DM II   sliding scale insulin      CKD III   Creat baseline 1.4-1.5. Patient with baseline creat here.   -BID BMP with diuresis     BELEN   -CPAP at bedtime     Other chronic issues:    COPD  Mononeuronitis  Vitamin D deficiency  Hypothyroid   Osteoarthritis       Patient seen and discussed w/ Dr. Junior CASTANON  [facility]  Cardiology  [phone]

## 2019-04-26 NOTE — PROGRESS NOTES
"SPIRITUAL HEALTH SERVICES  SPIRITUAL ASSESSMENT Progress Note  [facility] ([place]) 6C   ON-CALL VISIT    REFERRAL SOURCE: Hospital  request upon admission     I met with Mariel and her friend. Mariel requested a prayer \"that my medical team would find a solution to all of this fluid\" which I offered, we ended with the \"Our Father,\" and then Mariel offered her own silent prayer. She asked about the availability of communion on Sunday and I oriented her to the availability of support from chaplains, including our  .    PLAN: [facility] remains available for ongoing support for the duration of patient's hospitalization.    Yumiko Garcia  Chaplain Resident  Pager [phone]    "

## 2019-04-26 NOTE — PROGRESS NOTES
"CLINICAL NUTRITION SERVICES - ASSESSMENT NOTE     Nutrition Prescription    Malnutrition Status:    Patient does not meet two of the above criteria necessary for diagnosing malnutrition but is at risk for malnutrition    Recommendations already ordered by Registered Dietitian (RD):  Continue low sodium diet    Future/Additional Recommendations:  If pt appetite/intake decline, consider scheduling low sodium, low CHO snack/supplememnt to help meet needs     REASON FOR ASSESSMENT  Mariel Ribeiro is a/an 73 year old female assessed by the dietitian for Admission Nutrition Risk Screen for new/uncontrolled diabetes    NUTRITION HISTORY  PMH includes, COPD, HTN, hyperlipidemia, HF, DMT2, and CKD3. RN notes, PTA wound on great L toe.     Per discussion with pt, she follows a moderated consistent carb diet at home, counting carbs and moderating portions. No issues with intake PTA. Typically has breakfast of scrambled eggs, fruit and yogurt. Lunch typically consists of a cheese and meat roll-up. Dinner typically consists of meat and a vegetable. Pt states if her BG is WNL she sometimes as garlic twits as a snack. Monitors her BG frequently at home. Pt states moderating sodium is new to her.     CURRENT NUTRITION ORDERS  Diet: 2 g Sodium (no caffeine for 24h for tests)  Intake/Tolerance: No intake since admitted, was planning to order lunch after visit.     LABS  Labs reviewed  HgbA1c 6.5 on 4/3/19; BG  mg/dL in past 24h.    MEDICATIONS  Medications reviewed  Diuretics  Ferrous gluconate  Folic acid  Med sliding scale insulin correction   Niacin     ANTHROPOMETRICS  Height: 168.9 cm (5' 6.5\")  Most Recent Weight: (!) 151.1 kg (333 lb 1.6 oz)    IBW: 60.3 kg  BMI: Obesity Grade III BMI >40  Weight History: Weight trending up over past 5 months, ?r/t fluid retention and HF.   Wt Readings from Last 10 Encounters:   04/26/19 (!) 151.1 kg (333 lb 1.6 oz)   04/09/19 149.7 kg (330 lb)   03/13/19 146.1 kg (322 lb)   02/13/19 " 141.5 kg (312 lb)   02/04/19 (P) 141.9 kg (312 lb 12.8 oz)   01/28/19 137.9 kg (304 lb)   12/24/18 137.9 kg (304 lb)   12/11/18 135.6 kg (299 lb)   11/29/18 137.9 kg (304 lb 1.6 oz)   11/26/18 137.4 kg (303 lb)   .    Dosing Weight: 83 kg    ASSESSED NUTRITION NEEDS  Estimated Energy Needs: 2813-5624 kcals/day (20 - 25 kcals/kg)  Justification: Obese  Estimated Protein Needs:  grams protein/day (1.1 - 1.4 grams of pro/kg)  Justification: Increased needs 2/2 HF  Estimated Fluid Needs: 7709-3210 mL/day (1 mL/kcal)   Justification: Maintenance, or Per provider pending fluid status    PHYSICAL FINDINGS  See malnutrition section below.    MALNUTRITION  % Intake: Decreased intake does not meet criteria  % Weight Loss: None noted  Subcutaneous Fat Loss: None observed  Muscle Loss: None observed - although difficult to assess with edema   Fluid Accumulation/Edema: Moderate  Malnutrition Diagnosis: Patient does not meet two of the above criteria necessary for diagnosing malnutrition but is at risk for malnutrition    NUTRITION DIAGNOSIS  Food- and nutrition-related knowledge deficit related to low sodium diet as evidenced by pt reports not having formal low sodium diet education in the past      INTERVENTIONS  Implementation  Nutrition Education: Provided education on low sodium diet. Discussed foods low in sodium and how to season food without salt. Provided pt with handouts - Tips for Low Sodium Diet, Seasoning Your Food without Salt and Low Sodium Foods. Also provide pt with menu listing sodium nutritionals.     Goals  1. Patient to consume % of nutritionally adequate meal trays TID, or the equivalent with supplements/snacks.    2. Pt to verbalize 2 ways to reduce sodium in diet     Monitoring/Evaluation  Progress toward goals will be monitored and evaluated per protocol.    Marcela Nicholas, KATHARINE, LD  6C pgr: [phone]

## 2019-04-26 NOTE — PLAN OF CARE
D: Pulmonary edema with congestive heart failure (H)    I: Monitored vitals and assessed pt status.       A: A0x4. VSS, on 2L NC 98%. NSR. Afebrile. Pain denied. Good UO with Bumex. PTA wound on great L toe. Pt uses cane to get to BR. Pt refusing q4h Blood sugars and does not want to have her fsg checked every 8 and does not want aspart. She will talk to team regarding a few med changes. Pt is diuresing well with IV Bumex. Pt arms have multiple bruising from a fall at home.    I/O this shift:  In: 60 [P.O.:60]  Out: 1000 [Urine:1000]    Temp:  [97.6  F (36.4  C)-97.9  F (36.6  C)] 97.9  F (36.6  C)  Pulse:  [52-73] 73  Heart Rate:  [55-83] 74  Resp:  [12-30] 18  BP: (136-186)/(56-75) 156/69  SpO2:  [96 %-100 %] 96 %      P: Continue to monitor Pt status and report changes to treatment team.

## 2019-04-27 ENCOUNTER — APPOINTMENT (OUTPATIENT)
Dept: PHYSICAL THERAPY | Facility: CLINIC | Age: 74
DRG: 291 | End: 2019-04-27
Attending: STUDENT IN AN ORGANIZED HEALTH CARE EDUCATION/TRAINING PROGRAM
Payer: MEDICARE

## 2019-04-27 LAB
ANION GAP SERPL CALCULATED.3IONS-SCNC: 10 MMOL/L (ref 3–14)
ANION GAP SERPL CALCULATED.3IONS-SCNC: 6 MMOL/L (ref 3–14)
BASE EXCESS BLDV CALC-SCNC: 8.8 MMOL/L
BUN SERPL-MCNC: 30 MG/DL (ref 7–30)
BUN SERPL-MCNC: 34 MG/DL (ref 7–30)
CALCIUM SERPL-MCNC: 8.9 MG/DL (ref 8.5–10.1)
CALCIUM SERPL-MCNC: 9.7 MG/DL (ref 8.5–10.1)
CHLORIDE SERPL-SCNC: 101 MMOL/L (ref 94–109)
CHLORIDE SERPL-SCNC: 104 MMOL/L (ref 94–109)
CO2 SERPL-SCNC: 29 MMOL/L (ref 20–32)
CO2 SERPL-SCNC: 33 MMOL/L (ref 20–32)
CREAT SERPL-MCNC: 1.61 MG/DL (ref 0.52–1.04)
CREAT SERPL-MCNC: 1.81 MG/DL (ref 0.52–1.04)
ERYTHROCYTE [DISTWIDTH] IN BLOOD BY AUTOMATED COUNT: 13.5 % (ref 10–15)
GFR SERPL CREATININE-BSD FRML MDRD: 27 ML/MIN/{1.73_M2}
GFR SERPL CREATININE-BSD FRML MDRD: 31 ML/MIN/{1.73_M2}
GLUCOSE BLDC GLUCOMTR-MCNC: 110 MG/DL (ref 70–99)
GLUCOSE SERPL-MCNC: 116 MG/DL (ref 70–99)
GLUCOSE SERPL-MCNC: 132 MG/DL (ref 70–99)
HCO3 BLDV-SCNC: 36 MMOL/L (ref 21–28)
HCT VFR BLD AUTO: 37.9 % (ref 35–47)
HGB BLD-MCNC: 11.5 G/DL (ref 11.7–15.7)
MAGNESIUM SERPL-MCNC: 1.5 MG/DL (ref 1.6–2.3)
MCH RBC QN AUTO: 27.9 PG (ref 26.5–33)
MCHC RBC AUTO-ENTMCNC: 30.3 G/DL (ref 31.5–36.5)
MCV RBC AUTO: 92 FL (ref 78–100)
O2/TOTAL GAS SETTING VFR VENT: ABNORMAL %
PCO2 BLDV: 68 MM HG (ref 40–50)
PH BLDV: 7.33 PH (ref 7.32–7.43)
PLATELET # BLD AUTO: 97 10E9/L (ref 150–450)
PO2 BLDV: 22 MM HG (ref 25–47)
POTASSIUM SERPL-SCNC: 3.5 MMOL/L (ref 3.4–5.3)
POTASSIUM SERPL-SCNC: 4 MMOL/L (ref 3.4–5.3)
RBC # BLD AUTO: 4.12 10E12/L (ref 3.8–5.2)
SODIUM SERPL-SCNC: 139 MMOL/L (ref 133–144)
SODIUM SERPL-SCNC: 142 MMOL/L (ref 133–144)
WBC # BLD AUTO: 5.7 10E9/L (ref 4–11)

## 2019-04-27 PROCEDURE — 25000128 H RX IP 250 OP 636: Performed by: PHYSICIAN ASSISTANT

## 2019-04-27 PROCEDURE — 25000132 ZZH RX MED GY IP 250 OP 250 PS 637: Performed by: PHYSICIAN ASSISTANT

## 2019-04-27 PROCEDURE — 97530 THERAPEUTIC ACTIVITIES: CPT | Mod: GP

## 2019-04-27 PROCEDURE — 36415 COLL VENOUS BLD VENIPUNCTURE: CPT | Performed by: INTERNAL MEDICINE

## 2019-04-27 PROCEDURE — 25000132 ZZH RX MED GY IP 250 OP 250 PS 637: Performed by: INTERNAL MEDICINE

## 2019-04-27 PROCEDURE — A9270 NON-COVERED ITEM OR SERVICE: HCPCS | Performed by: INTERNAL MEDICINE

## 2019-04-27 PROCEDURE — 36415 COLL VENOUS BLD VENIPUNCTURE: CPT | Performed by: PHYSICIAN ASSISTANT

## 2019-04-27 PROCEDURE — 82803 BLOOD GASES ANY COMBINATION: CPT | Performed by: STUDENT IN AN ORGANIZED HEALTH CARE EDUCATION/TRAINING PROGRAM

## 2019-04-27 PROCEDURE — 25000132 ZZH RX MED GY IP 250 OP 250 PS 637: Performed by: STUDENT IN AN ORGANIZED HEALTH CARE EDUCATION/TRAINING PROGRAM

## 2019-04-27 PROCEDURE — 83735 ASSAY OF MAGNESIUM: CPT | Performed by: PHYSICIAN ASSISTANT

## 2019-04-27 PROCEDURE — 99233 SBSQ HOSP IP/OBS HIGH 50: CPT | Mod: GC | Performed by: INTERNAL MEDICINE

## 2019-04-27 PROCEDURE — A9270 NON-COVERED ITEM OR SERVICE: HCPCS | Performed by: STUDENT IN AN ORGANIZED HEALTH CARE EDUCATION/TRAINING PROGRAM

## 2019-04-27 PROCEDURE — 40000275 ZZH STATISTIC RCP TIME EA 10 MIN

## 2019-04-27 PROCEDURE — 36415 COLL VENOUS BLD VENIPUNCTURE: CPT | Performed by: STUDENT IN AN ORGANIZED HEALTH CARE EDUCATION/TRAINING PROGRAM

## 2019-04-27 PROCEDURE — 85027 COMPLETE CBC AUTOMATED: CPT | Performed by: PHYSICIAN ASSISTANT

## 2019-04-27 PROCEDURE — 97116 GAIT TRAINING THERAPY: CPT | Mod: GP

## 2019-04-27 PROCEDURE — 21400000 ZZH R&B CCU UMMC

## 2019-04-27 PROCEDURE — 80048 BASIC METABOLIC PNL TOTAL CA: CPT | Performed by: PHYSICIAN ASSISTANT

## 2019-04-27 PROCEDURE — 97161 PT EVAL LOW COMPLEX 20 MIN: CPT | Mod: GP

## 2019-04-27 PROCEDURE — 00000146 ZZHCL STATISTIC GLUCOSE BY METER IP

## 2019-04-27 PROCEDURE — 82803 BLOOD GASES ANY COMBINATION: CPT | Performed by: INTERNAL MEDICINE

## 2019-04-27 PROCEDURE — A9270 NON-COVERED ITEM OR SERVICE: HCPCS | Performed by: PHYSICIAN ASSISTANT

## 2019-04-27 PROCEDURE — 94660 CPAP INITIATION&MGMT: CPT

## 2019-04-27 RX ORDER — POTASSIUM CHLORIDE 750 MG/1
40 TABLET, EXTENDED RELEASE ORAL DAILY
Status: DISCONTINUED | OUTPATIENT
Start: 2019-04-27 | End: 2019-05-03 | Stop reason: HOSPADM

## 2019-04-27 RX ORDER — MICONAZOLE NITRATE 20 MG/G
CREAM TOPICAL 2 TIMES DAILY
Status: DISCONTINUED | OUTPATIENT
Start: 2019-04-27 | End: 2019-05-01

## 2019-04-27 RX ADMIN — NIACIN ER 500 MG TABLET,EXTENDED RELEASE 500 MG: at 16:28

## 2019-04-27 RX ADMIN — ACETAMINOPHEN 975 MG: 325 TABLET, FILM COATED ORAL at 17:51

## 2019-04-27 RX ADMIN — ESCITALOPRAM 20 MG: 20 TABLET, FILM COATED ORAL at 08:25

## 2019-04-27 RX ADMIN — METOPROLOL TARTRATE 75 MG: 25 TABLET ORAL at 20:45

## 2019-04-27 RX ADMIN — METOPROLOL TARTRATE 75 MG: 25 TABLET ORAL at 08:26

## 2019-04-27 RX ADMIN — ACETAMINOPHEN 975 MG: 325 TABLET, FILM COATED ORAL at 08:33

## 2019-04-27 RX ADMIN — ASPIRIN 81 MG CHEWABLE TABLET 81 MG: 81 TABLET CHEWABLE at 08:25

## 2019-04-27 RX ADMIN — SENNOSIDES AND DOCUSATE SODIUM 1 TABLET: 8.6; 5 TABLET ORAL at 08:30

## 2019-04-27 RX ADMIN — LOSARTAN POTASSIUM 25 MG: 25 TABLET, FILM COATED ORAL at 08:26

## 2019-04-27 RX ADMIN — BUMETANIDE 2 MG: 0.25 INJECTION INTRAMUSCULAR; INTRAVENOUS at 06:01

## 2019-04-27 RX ADMIN — PREGABALIN 100 MG: 100 CAPSULE ORAL at 20:45

## 2019-04-27 RX ADMIN — ATORVASTATIN CALCIUM 40 MG: 40 TABLET, FILM COATED ORAL at 20:45

## 2019-04-27 RX ADMIN — BUMETANIDE 2 MG: 0.25 INJECTION INTRAMUSCULAR; INTRAVENOUS at 20:50

## 2019-04-27 RX ADMIN — FOLIC ACID 1 MG: 1 TABLET ORAL at 08:26

## 2019-04-27 RX ADMIN — LEVOTHYROXINE SODIUM 150 MCG: 150 TABLET ORAL at 08:26

## 2019-04-27 RX ADMIN — PREGABALIN 100 MG: 100 CAPSULE ORAL at 08:30

## 2019-04-27 RX ADMIN — POTASSIUM CHLORIDE 40 MEQ: 750 TABLET, EXTENDED RELEASE ORAL at 08:30

## 2019-04-27 ASSESSMENT — ACTIVITIES OF DAILY LIVING (ADL)
ADLS_ACUITY_SCORE: 21
ADLS_ACUITY_SCORE: 23
ADLS_ACUITY_SCORE: 21
ADLS_ACUITY_SCORE: 23

## 2019-04-27 ASSESSMENT — MIFFLIN-ST. JEOR: SCORE: 2013.42

## 2019-04-27 ASSESSMENT — PAIN DESCRIPTION - DESCRIPTORS: DESCRIPTORS: TIGHTNESS

## 2019-04-27 NOTE — PLAN OF CARE
OT/edema 6C: Orders received and acknowledged. Planned to see pt this AM, however, pt with episode of unresponsiveness. Will return back today as is appropriate/as time allows, otherwise will reschedule.

## 2019-04-27 NOTE — PROGRESS NOTES
"  Cardiology Progress Note  Subjective:    Patient indicated her primary concern is that she has not been given her NPH insulin. She continues to require oxygen via NC. Patient had an episode of somnolence this morning that spontaneously resolved     Objective:  Most recent vital signs:  /48 (BP Location: Left arm)   Pulse 73   Temp 98.2  F (36.8  C) (Oral)   Resp 18   Ht 1.689 m (5' 6.5\")   Wt 147.9 kg (326 lb)   SpO2 93%   BMI 51.83 kg/m    Temp:  [97.5  F (36.4  C)-98.2  F (36.8  C)] 98.2  F (36.8  C)  Heart Rate:  [54-74] 54  Resp:  [18] 18  BP: (119-173)/(43-72) 122/48  SpO2:  [93 %-100 %] 93 %  Wt Readings from Last 2 Encounters:   04/27/19 147.9 kg (326 lb)   04/09/19 149.7 kg (330 lb)       Intake/Output Summary (Last 24 hours) at 4/27/2019 1647  Last data filed at 4/27/2019 1100  Gross per 24 hour   Intake 120 ml   Output 2000 ml   Net -1880 ml     Physical exam:  General: In bed, in NAD  HEENT: EOMI, no scleral icterus or injection, unable to visualize JVD  CARDIAC: RRR, no m/r/g appreciated. Peripheral pulses 2+  RESP: CTAB, no wheezes, rhonchi or crackles appreciated.  GI: NABS, NT/ND, no guarding or rebound  EXTREMITIES: Trace LE edema. No bruits appreciated.   SKIN: No acute lesions appreciated  NEURO: Alert and oriented X3, CN II-XII grossly intact, no focal neurological deficits noted    Labs (Past three days):  CBC  Recent Labs   Lab 04/27/19  0602 04/26/19  0528 04/25/19  1400   WBC 5.7 7.4 7.7   RBC 4.12 4.34 4.11   HGB 11.5* 12.2 11.8   HCT 37.9 40.4 37.9   MCV 92 93 92   MCH 27.9 28.1 28.7   MCHC 30.3* 30.2* 31.1*   RDW 13.5 13.7 13.6   PLT 97* 136* 132*     BMP  Recent Labs   Lab 04/27/19  1547 04/27/19  0602 04/26/19  1709 04/26/19  0528    142 141 141   POTASSIUM 4.0 3.5 4.2 4.3   CHLORIDE 101 104 106 109   CO2 33* 29 31 25   ANIONGAP 6 10 4 7   * 116* 111* 123*   BUN 34* 30 25 24   CR 1.81* 1.61* 1.44* 1.42*   GFRESTIMATED 27* 31* 36* 37*   GFRESTBLACK 32* 36* 42* " 42*   ANTOINETTE 9.7 8.9 9.5 9.4     Troponins:     INRNo lab results found in last 7 days.  Liver panelNo lab results found in last 7 days.    Assessment & Plan:    73 year old with a PMHx of CAD s/p PCI and CABG in 2018, DM2, HLD, CKD Stage III, COPD, and HFpEF. She is admitted with shortness of breath and weight gain. She is found to have pulmonary vascular congestion on a CXR. EDW close to or below 300 lbs, Patient 338 on admission.      HFpEF   Fluid Overload   Shortness of breath   Patient presents with progressively worsening SOB and orthopnea, weight gain of approximately 30 lbs, and edema in her lower extremities/abdomen. CXR remarkable for pulmonary edema. She has had admissions for HFpEF and fluid overload following her CABG in 2018. She has dietary indiscretion and non-compliance using bumex 2mg q48. No other obvious etiology for her shortness of breath. NO EKG changes and trop negative so low suspicion for ACS though we will get an echo. No evidence of PNA such as leukocytosis or focal consolidation on CXR. A VQ scan was negative for PE and the progress of her SOB has been insidious rather than abrupt.  Echo showed EF decrease to 35-40% from 40-45% with dilated IVC. She was given metolazone on admission for increased diuresis.  -Bumex 2 mg IV BID, titrate to UOP goals   -Goal UOP 2-3 L/24 hours   -I/O, daily weights  -Salt restrict, fluid restrict   -BID BMP, lytes replacement   -Lymphedema consult   -PT/OT/CORE      CAD status post CABG   HLD    Patient with history of multiple stents and CABG (LAD, PDA, OM) July of 2018, prolonged hospitalization due to weakness.   -Aspirin 81 mg daily   -Lipitor 40 mg daily   -Metoprolol 75 mg BID   -Naicin      Hypercapnia   The patient had increased somnolence on 4/27 in AM and found to have increased CO2 on VBG to 68. After placement of bipap she had improvement in cognition.     DM II   sliding scale insulin      CKD III   Creat baseline 1.4-1.5. Patient with baseline  creat here.   -BID BMP with diuresis      BELEN   -CPAP at bedtime      Other chronic issues:   COPD  Mononeuronitis  Vitamin D deficiency  Hypothyroid   Osteoarthritis      Patient seen and discussed w/ Dr. Junior Potter MD  Internal Medicine PGY-3  P: [phone]

## 2019-04-27 NOTE — PROGRESS NOTES
"   04/27/19 1435   Quick Adds   Type of Visit Initial PT Evaluation   Living Environment   Lives With friend(s)  ([place])   Living Arrangements house   Home Accessibility stairs to enter home   Number of Stairs, Main Entrance 2   Stair Railings, Main Entrance railings on both sides of stairs   Transportation Anticipated family or friend will provide   Living Environment Comment Odalis does grocery shopping and driving, pt does not drive, tub shower but has bench she uses to get in, has counter next to toilet she uses   Self-Care   Usual Activity Tolerance fair   Current Activity Tolerance fair   Regular Exercise No   Equipment Currently Used at Home cane, straight;grab bar, tub/shower;tub bench;walker, rolling;walker, standard   Activity/Exercise/Self-Care Comment Walks inside house for exercise, was previously going to OP PT for concussion, has had experience with HH PT/OT/SLP, cane and 4WW use - cane with short distance like using bathroom, uses 4WW for longer in house ambulation    Functional Level Prior   Ambulation 1-->assistive equipment   Transferring 1-->assistive equipment   Toileting 1-->assistive equipment   Bathing 3-->assistive equipment and person   Communication 0-->understands/communicates without difficulty   Cognition 0 - no cognition issues reported   Fall history within last six months yes   Number of times patient has fallen within last six months 6  (Pt reports d/t L LE weakness)   Which of the above functional risks had a recent onset or change? fall history   General Information   Onset of Illness/Injury or Date of Surgery - Date 04/25/19   Referring Physician Samantha Cowan MD   Patient/Family Goals Statement Return home safely   Pertinent History of Current Problem (include personal factors and/or comorbidities that impact the POC) Per chart: \"73 year old with a PMHx of CAD s/p PCI and CABG in 2018, DM2, HLD, CKD Stage III, COPD, and HFpEF. She is admitted with shortness of breath and " "weight gain. She is found to have pulmonary vascular congestion on a CXR. EDW close to or below 300 lbs, presently closer to 330 lbs.\"   Heart Disease Risk Factors Age;Overweight;Lack of physical activity;Diabetes;Medical history   General Observations Encountered pt supine in bed napping, upon waking up agreeable to therapy.    General Info Comments Activity: Ambulate   Cognitive Status Examination   Orientation orientation to person, place and time   Level of Consciousness alert   Follows Commands and Answers Questions 100% of the time   Personal Safety and Judgment intact   Memory   (Pt reports some short term memory impairements)   Posture    Posture Kyphosis;Protracted shoulders   Range of Motion (ROM)   ROM Comment B LE WFL   Strength   Strength Comments R LE grossly 4/5, L LE grossly 3+/5, except L DF 4/5    Bed Mobility   Bed Mobility Comments Supine > sit CGA   Transfer Skills   Transfer Comments Sit <> stand IND   Gait   Gait Comments Ambulated ~50ft + ~50ft +~100 ft with 4WW, CGA-SBA.    Balance   Balance Comments Unsteadiness noted with turning    Sensory Examination   Sensory Perception Comments Neuropathy in bethany feet   General Therapy Interventions   Planned Therapy Interventions balance training;bed mobility training;gait training;neuromuscular re-education;strengthening;progressive activity/exercise   Clinical Impression   Criteria for Skilled Therapeutic Intervention yes, treatment indicated   PT Diagnosis Impaired functional mobility   Influenced by the following impairments decreased strength, balance, endurance, pain   Functional limitations due to impairments Bed mobility, gait, stairs, functional endurance   Clinical Presentation Stable/Uncomplicated   Clinical Presentation Rationale Clinical judgement   Clinical Decision Making (Complexity) Low complexity   Therapy Frequency` 5 times/week   Predicted Duration of Therapy Intervention (days/wks) 1-2 weeks   Anticipated Discharge Disposition " "Transitional Care Facility   Risk & Benefits of therapy have been explained Yes   Patient, Family & other staff in agreement with plan of care Yes   [facility] AM-PAC TM \"6 Clicks\"   2016, Trustees of [facility], under license to [employer].  All rights reserved.   6 Clicks Short Forms Basic Mobility Inpatient Short Form   [facility] AM-PAC  \"6 Clicks\" V.2 Basic Mobility Inpatient Short Form   1. Turning from your back to your side while in a flat bed without using bedrails? 3 - A Little   2. Moving from lying on your back to sitting on the side of a flat bed without using bedrails? 3 - A Little   3. Moving to and from a bed to a chair (including a wheelchair)? 4 - None   4. Standing up from a chair using your arms (e.g., wheelchair, or bedside chair)? 4 - None   5. To walk in hospital room? 3 - A Little   6. Climbing 3-5 steps with a railing? 2 - A Lot   Basic Mobility Raw Score (Score out of 24.Lower scores equate to lower levels of function) 19   Total Evaluation Time   Total Evaluation Time (Minutes) 7     "

## 2019-04-27 NOTE — PLAN OF CARE
PT - 6C - Initial evaluation completed, treatment initiated   Discharge Planner PT   Patient plan for discharge: Pt wants to return safely to home but is open to rehab options, did not have positive past experience with HH therapies so less open to that.    Current status: Supine > sit CGA. Sit <> stand IND from various heights. Ambulated ~50ft +~50ft +~100 ft with 4WW, CGA-SBA.  2 prolonged seated rest breaks required between each bout d/t fatigue and mild dizziness. Cueing required for locking brakes prior to seated rest. VSS on 2L O2 throughout session.   Barriers to return to prior living situation: Medical needs, O2 needs, strength, balance, endurance  Recommendations for discharge: TCU   Rationale for recommendations: Pt currently below baseline in regards to functional mobility and pt herself states she feels she is moving around worse than normal. Pt will benefit from continued skilled therapy to address functional mobility deficits prior to discharge home.    Entered by: Sarah Wagner 04/27/2019 4:03 PM

## 2019-04-27 NOTE — PLAN OF CARE
VSS on RA. Sats high 90s on 2L NC. Somnolent majority of morning w/ unsteadiness when ambulating. Episode of lightheadedness, unresponsiveness for approx 30 seconds, and twitching this morning, (team notified and assessed. VPG drawn. CO2 elevated. Patient put back on bipap for a couple hours. More alert and steady following). C/o neck pain and back pain, tylenol and heating packs provided. Groin red/irritated, antifungal cream ordered. Continues w/ IV bumex. BG levels wnl, patient refusing Novolog but as of yet no need to give. Will continue POC, cards 1 primary.

## 2019-04-27 NOTE — PLAN OF CARE
D: Admit 4/25 with CHF exacerbation & pulmonary edema. PMHx of CAD s/p PCI and CABG in 2018, DM2, HLD, CKD Stage III, COPD, and HFpEF.    I/A: Pt A/Ox4. VSS. SR/[place]. 2L NC + BIPAP overnight. PRN tylenol given for generalized pain/discomfort. BLE 3+ pitting edema, wrapped w/ compression stockings. Large bruising on both arms d/t falling at home. Pt reports painful ingrown L. Toenail that was to be surgically removed this week. Pt refusing BG checks Q4H & insulin aspart. Pt states she does not take this insulin at home, but does take a different type of insulin BID. Assist 1 with cane.    P: Continue diuresis. Continue monitoring & notify Cards 1 of changes/concerns.

## 2019-04-27 NOTE — PLAN OF CARE
NEURO: Alert and oriented x4.   RESPIRATORY: LS diminished, SpO2>90% on RA. SOB on exertion.   CARDIO: HR sinus rhythm- sinus bradycardia (50-70s). BP elevated- after BP med, 150s/x. Other VSS. BLE 3+ pitting edema. Lymph consult ordered. Home compression stockings on.   GI/:  BS hypoactive. + gas, no BM. Reported loose stools at baseline. Tolerating PO intake. Frequent and urgent voiding due to diuretics. Some urinary incontinence.   SKIN:  Intact except toe wound. Several bruises on BUE.   ACTIVITY: Assist 1 with cane.   PAIN: Denies pain.   LINES: PIV SL.  PLAN:  Continue diuresis, notify MD with concerns.

## 2019-04-28 ENCOUNTER — APPOINTMENT (OUTPATIENT)
Dept: OCCUPATIONAL THERAPY | Facility: CLINIC | Age: 74
DRG: 291 | End: 2019-04-28
Attending: STUDENT IN AN ORGANIZED HEALTH CARE EDUCATION/TRAINING PROGRAM
Payer: MEDICARE

## 2019-04-28 ENCOUNTER — APPOINTMENT (OUTPATIENT)
Dept: OCCUPATIONAL THERAPY | Facility: CLINIC | Age: 74
DRG: 291 | End: 2019-04-28
Payer: MEDICARE

## 2019-04-28 LAB
ANION GAP SERPL CALCULATED.3IONS-SCNC: 8 MMOL/L (ref 3–14)
ANION GAP SERPL CALCULATED.3IONS-SCNC: 9 MMOL/L (ref 3–14)
BASE EXCESS BLDV CALC-SCNC: 6.5 MMOL/L
BASE EXCESS BLDV CALC-SCNC: 7.2 MMOL/L
BUN SERPL-MCNC: 40 MG/DL (ref 7–30)
BUN SERPL-MCNC: 44 MG/DL (ref 7–30)
CALCIUM SERPL-MCNC: 9 MG/DL (ref 8.5–10.1)
CALCIUM SERPL-MCNC: 9.1 MG/DL (ref 8.5–10.1)
CHLORIDE SERPL-SCNC: 102 MMOL/L (ref 94–109)
CHLORIDE SERPL-SCNC: 99 MMOL/L (ref 94–109)
CO2 SERPL-SCNC: 29 MMOL/L (ref 20–32)
CO2 SERPL-SCNC: 30 MMOL/L (ref 20–32)
CREAT SERPL-MCNC: 1.88 MG/DL (ref 0.52–1.04)
CREAT SERPL-MCNC: 2.01 MG/DL (ref 0.52–1.04)
ERYTHROCYTE [DISTWIDTH] IN BLOOD BY AUTOMATED COUNT: 13.3 % (ref 10–15)
GFR SERPL CREATININE-BSD FRML MDRD: 24 ML/MIN/{1.73_M2}
GFR SERPL CREATININE-BSD FRML MDRD: 26 ML/MIN/{1.73_M2}
GLUCOSE BLDC GLUCOMTR-MCNC: 136 MG/DL (ref 70–99)
GLUCOSE BLDC GLUCOMTR-MCNC: 154 MG/DL (ref 70–99)
GLUCOSE BLDC GLUCOMTR-MCNC: 160 MG/DL (ref 70–99)
GLUCOSE BLDC GLUCOMTR-MCNC: 174 MG/DL (ref 70–99)
GLUCOSE BLDC GLUCOMTR-MCNC: 190 MG/DL (ref 70–99)
GLUCOSE BLDC GLUCOMTR-MCNC: 202 MG/DL (ref 70–99)
GLUCOSE SERPL-MCNC: 157 MG/DL (ref 70–99)
GLUCOSE SERPL-MCNC: 196 MG/DL (ref 70–99)
HCO3 BLDV-SCNC: 34 MMOL/L (ref 21–28)
HCO3 BLDV-SCNC: 36 MMOL/L (ref 21–28)
HCT VFR BLD AUTO: 37.2 % (ref 35–47)
HGB BLD-MCNC: 11.3 G/DL (ref 11.7–15.7)
MAGNESIUM SERPL-MCNC: 1.5 MG/DL (ref 1.6–2.3)
MAGNESIUM SERPL-MCNC: 1.6 MG/DL (ref 1.6–2.3)
MCH RBC QN AUTO: 27.8 PG (ref 26.5–33)
MCHC RBC AUTO-ENTMCNC: 30.4 G/DL (ref 31.5–36.5)
MCV RBC AUTO: 91 FL (ref 78–100)
O2/TOTAL GAS SETTING VFR VENT: 21 %
O2/TOTAL GAS SETTING VFR VENT: 30 %
PCO2 BLDV: 63 MM HG (ref 40–50)
PCO2 BLDV: 71 MM HG (ref 40–50)
PH BLDV: 7.31 PH (ref 7.32–7.43)
PH BLDV: 7.34 PH (ref 7.32–7.43)
PLATELET # BLD AUTO: 116 10E9/L (ref 150–450)
PO2 BLDV: 31 MM HG (ref 25–47)
PO2 BLDV: 47 MM HG (ref 25–47)
POTASSIUM SERPL-SCNC: 3.7 MMOL/L (ref 3.4–5.3)
POTASSIUM SERPL-SCNC: 3.8 MMOL/L (ref 3.4–5.3)
RBC # BLD AUTO: 4.07 10E12/L (ref 3.8–5.2)
SODIUM SERPL-SCNC: 138 MMOL/L (ref 133–144)
SODIUM SERPL-SCNC: 139 MMOL/L (ref 133–144)
WBC # BLD AUTO: 6.8 10E9/L (ref 4–11)

## 2019-04-28 PROCEDURE — 97166 OT EVAL MOD COMPLEX 45 MIN: CPT | Mod: GO

## 2019-04-28 PROCEDURE — 99232 SBSQ HOSP IP/OBS MODERATE 35: CPT | Mod: GC | Performed by: INTERNAL MEDICINE

## 2019-04-28 PROCEDURE — 94660 CPAP INITIATION&MGMT: CPT

## 2019-04-28 PROCEDURE — 25000128 H RX IP 250 OP 636: Performed by: PHYSICIAN ASSISTANT

## 2019-04-28 PROCEDURE — 83735 ASSAY OF MAGNESIUM: CPT | Performed by: PHYSICIAN ASSISTANT

## 2019-04-28 PROCEDURE — 80048 BASIC METABOLIC PNL TOTAL CA: CPT | Performed by: PHYSICIAN ASSISTANT

## 2019-04-28 PROCEDURE — 21400000 ZZH R&B CCU UMMC

## 2019-04-28 PROCEDURE — A9270 NON-COVERED ITEM OR SERVICE: HCPCS | Performed by: INTERNAL MEDICINE

## 2019-04-28 PROCEDURE — 25000131 ZZH RX MED GY IP 250 OP 636 PS 637: Performed by: INTERNAL MEDICINE

## 2019-04-28 PROCEDURE — 97110 THERAPEUTIC EXERCISES: CPT | Mod: GO

## 2019-04-28 PROCEDURE — 36415 COLL VENOUS BLD VENIPUNCTURE: CPT | Performed by: PHYSICIAN ASSISTANT

## 2019-04-28 PROCEDURE — A9270 NON-COVERED ITEM OR SERVICE: HCPCS | Performed by: PHYSICIAN ASSISTANT

## 2019-04-28 PROCEDURE — 25000132 ZZH RX MED GY IP 250 OP 250 PS 637: Performed by: INTERNAL MEDICINE

## 2019-04-28 PROCEDURE — 25000132 ZZH RX MED GY IP 250 OP 250 PS 637: Performed by: PHYSICIAN ASSISTANT

## 2019-04-28 PROCEDURE — 36415 COLL VENOUS BLD VENIPUNCTURE: CPT | Performed by: STUDENT IN AN ORGANIZED HEALTH CARE EDUCATION/TRAINING PROGRAM

## 2019-04-28 PROCEDURE — 97535 SELF CARE MNGMENT TRAINING: CPT | Mod: GO

## 2019-04-28 PROCEDURE — 85027 COMPLETE CBC AUTOMATED: CPT | Performed by: PHYSICIAN ASSISTANT

## 2019-04-28 PROCEDURE — 97140 MANUAL THERAPY 1/> REGIONS: CPT | Mod: GO

## 2019-04-28 PROCEDURE — 00000146 ZZHCL STATISTIC GLUCOSE BY METER IP

## 2019-04-28 PROCEDURE — 25000132 ZZH RX MED GY IP 250 OP 250 PS 637: Performed by: STUDENT IN AN ORGANIZED HEALTH CARE EDUCATION/TRAINING PROGRAM

## 2019-04-28 PROCEDURE — A9270 NON-COVERED ITEM OR SERVICE: HCPCS | Performed by: STUDENT IN AN ORGANIZED HEALTH CARE EDUCATION/TRAINING PROGRAM

## 2019-04-28 PROCEDURE — 82803 BLOOD GASES ANY COMBINATION: CPT | Performed by: STUDENT IN AN ORGANIZED HEALTH CARE EDUCATION/TRAINING PROGRAM

## 2019-04-28 PROCEDURE — 40000275 ZZH STATISTIC RCP TIME EA 10 MIN

## 2019-04-28 RX ADMIN — ACETAMINOPHEN 975 MG: 325 TABLET, FILM COATED ORAL at 15:09

## 2019-04-28 RX ADMIN — MAGNESIUM SULFATE HEPTAHYDRATE 4 G: 40 INJECTION, SOLUTION INTRAVENOUS at 20:17

## 2019-04-28 RX ADMIN — MICONAZOLE NITRATE: 20 CREAM TOPICAL at 20:24

## 2019-04-28 RX ADMIN — PREGABALIN 100 MG: 100 CAPSULE ORAL at 08:03

## 2019-04-28 RX ADMIN — NIACIN ER 500 MG TABLET,EXTENDED RELEASE 500 MG: at 08:03

## 2019-04-28 RX ADMIN — LOSARTAN POTASSIUM 25 MG: 25 TABLET, FILM COATED ORAL at 07:49

## 2019-04-28 RX ADMIN — INSULIN ASPART 2 UNITS: 100 INJECTION, SOLUTION INTRAVENOUS; SUBCUTANEOUS at 07:53

## 2019-04-28 RX ADMIN — PREGABALIN 100 MG: 100 CAPSULE ORAL at 20:12

## 2019-04-28 RX ADMIN — LEVOTHYROXINE SODIUM 150 MCG: 150 TABLET ORAL at 07:49

## 2019-04-28 RX ADMIN — INSULIN ASPART 1 UNITS: 100 INJECTION, SOLUTION INTRAVENOUS; SUBCUTANEOUS at 23:37

## 2019-04-28 RX ADMIN — BUMETANIDE 2 MG: 0.25 INJECTION INTRAMUSCULAR; INTRAVENOUS at 06:20

## 2019-04-28 RX ADMIN — ACETAMINOPHEN 975 MG: 325 TABLET, FILM COATED ORAL at 23:31

## 2019-04-28 RX ADMIN — POTASSIUM CHLORIDE 40 MEQ: 750 TABLET, EXTENDED RELEASE ORAL at 07:49

## 2019-04-28 RX ADMIN — FOLIC ACID 1 MG: 1 TABLET ORAL at 07:49

## 2019-04-28 RX ADMIN — MICONAZOLE NITRATE: 20 CREAM TOPICAL at 07:55

## 2019-04-28 RX ADMIN — SENNOSIDES AND DOCUSATE SODIUM 1 TABLET: 8.6; 5 TABLET ORAL at 07:49

## 2019-04-28 RX ADMIN — POTASSIUM CHLORIDE 20 MEQ: 750 TABLET, EXTENDED RELEASE ORAL at 09:49

## 2019-04-28 RX ADMIN — METOPROLOL TARTRATE 75 MG: 25 TABLET ORAL at 07:49

## 2019-04-28 RX ADMIN — POTASSIUM CHLORIDE 20 MEQ: 750 TABLET, EXTENDED RELEASE ORAL at 20:11

## 2019-04-28 RX ADMIN — INSULIN ASPART 2 UNITS: 100 INJECTION, SOLUTION INTRAVENOUS; SUBCUTANEOUS at 17:56

## 2019-04-28 RX ADMIN — ATORVASTATIN CALCIUM 40 MG: 40 TABLET, FILM COATED ORAL at 20:11

## 2019-04-28 RX ADMIN — ESCITALOPRAM 20 MG: 20 TABLET, FILM COATED ORAL at 07:49

## 2019-04-28 RX ADMIN — METOPROLOL TARTRATE 75 MG: 25 TABLET ORAL at 20:11

## 2019-04-28 RX ADMIN — ASPIRIN 81 MG CHEWABLE TABLET 81 MG: 81 TABLET CHEWABLE at 07:49

## 2019-04-28 RX ADMIN — TRAZODONE HYDROCHLORIDE 150 MG: 100 TABLET ORAL at 21:44

## 2019-04-28 ASSESSMENT — ACTIVITIES OF DAILY LIVING (ADL)
ADLS_ACUITY_SCORE: 21
ADLS_ACUITY_SCORE: 21
ADLS_ACUITY_SCORE: 20
ADLS_ACUITY_SCORE: 20
ADLS_ACUITY_SCORE: 21
ADLS_ACUITY_SCORE: 21

## 2019-04-28 ASSESSMENT — PAIN DESCRIPTION - DESCRIPTORS: DESCRIPTORS: ACHING;DISCOMFORT

## 2019-04-28 ASSESSMENT — MIFFLIN-ST. JEOR: SCORE: 2014.78

## 2019-04-28 NOTE — PLAN OF CARE
D: Admitted 4/25 with CHF exacerbation and pulmonary edema.  Hx of CAD s/p PCI and CABG in 2018, DM2, HLD, CKD stage III, COPD, and HF.    I/A: VSS, SR.  A&Ox4, on 2 L NC.  Bipap when sleeping.  Pt complainted of neck and back pain, intermittently relieved w/ hot packs and repositioning.  BLE lymph wraps reapplied.  R groin fold red/irriatated; antifungal applied.  Pt up w/1 and a cane, feeling lightheaded and dizzy when ambulating.  Blood sugars assessed q4h and managed w/ sliding scale insulin.      P: Plan to wean O2 as able.  Will continue diuresis tomorrow.  Will continue to monitor and notify Cards 1 with any concerns or questions.

## 2019-04-28 NOTE — PROGRESS NOTES
"  Cardiology Progress Note  Subjective:    No acute changes overnight. Patient feels well this morning     Objective:  Most recent vital signs:  /65 (BP Location: Left arm)   Pulse 73   Temp 98.3  F (36.8  C) (Oral)   Resp 18   Ht 1.689 m (5' 6.5\")   Wt 148 kg (326 lb 4.8 oz)   SpO2 96%   BMI 51.88 kg/m    Temp:  [98  F (36.7  C)-99  F (37.2  C)] 98.3  F (36.8  C)  Heart Rate:  [54-79] 69  Resp:  [18-20] 18  BP: (119-184)/(48-90) 142/65  SpO2:  [93 %-100 %] 96 %  Wt Readings from Last 2 Encounters:   04/28/19 148 kg (326 lb 4.8 oz)   04/09/19 149.7 kg (330 lb)       Intake/Output Summary (Last 24 hours) at 4/28/2019 1343  Last data filed at 4/28/2019 0852  Gross per 24 hour   Intake 260 ml   Output 1250 ml   Net -990 ml     Physical exam:  General: In bed, in NAD  HEENT: EOMI, no scleral icterus or injection  CARDIAC: RRR, no m/r/g appreciated. Peripheral pulses 2+  RESP: CTAB, no wheezes, rhonchi or crackles appreciated.  GI: NABS, NT obese abdomen, no guarding or rebound  EXTREMITIES: NO LE edema. No bruits appreciated.   SKIN: No acute lesions appreciated  NEURO: Alert and oriented X3, CN II-XII grossly intact, no focal neurological deficits noted      Labs (Past three days):  CBC  Recent Labs   Lab 04/28/19  0553 04/27/19  0602 04/26/19  0528 04/25/19  1400   WBC 6.8 5.7 7.4 7.7   RBC 4.07 4.12 4.34 4.11   HGB 11.3* 11.5* 12.2 11.8   HCT 37.2 37.9 40.4 37.9   MCV 91 92 93 92   MCH 27.8 27.9 28.1 28.7   MCHC 30.4* 30.3* 30.2* 31.1*   RDW 13.3 13.5 13.7 13.6   * 97* 136* 132*     BMP  Recent Labs   Lab 04/28/19  0553 04/27/19  1547 04/27/19  0602 04/26/19  1709    139 142 141   POTASSIUM 3.7 4.0 3.5 4.2   CHLORIDE 102 101 104 106   CO2 29 33* 29 31   ANIONGAP 8 6 10 4   * 132* 116* 111*   BUN 40* 34* 30 25   CR 1.88* 1.81* 1.61* 1.44*   GFRESTIMATED 26* 27* 31* 36*   GFRESTBLACK 30* 32* 36* 42*   ANTOINETTE 9.1 9.7 8.9 9.5   MAG  --  1.5*  --   --      Troponins:     INRNo lab results " found in last 7 days.  Liver panelNo lab results found in last 7 days.    Assessment & Plan:  73 year old with a PMHx of CAD s/p PCI and CABG in 2018, DM2, HLD, CKD Stage III, COPD, and HFpEF. She is admitted with shortness of breath and weight gain. She is found to have pulmonary vascular congestion on a CXR. EDW close to or below 300 lbs, Patient 338 on admission.      Changes today  - Diuretic Holiday for elevated Creatinine   - Weaning off Nasal cannula oxygen     HFpEF   Fluid Overload   Shortness of breath   Patient presents with progressively worsening SOB and orthopnea, weight gain of approximately 30 lbs, and edema in her lower extremities/abdomen. CXR remarkable for pulmonary edema. She has had admissions for HFpEF and fluid overload following her CABG in 2018. She has dietary indiscretion and non-compliance using bumex 2mg q48. No other obvious etiology for her shortness of breath. NO EKG changes and trop negative so low suspicion for ACS though we will get an echo. No evidence of PNA such as leukocytosis or focal consolidation on CXR. A VQ scan was negative for PE and the progress of her SOB has been insidious rather than abrupt.  Echo showed EF decrease to 35-40% from 40-45% with dilated IVC. She was given metolazone on admission for increased diuresis.  -Bumex 2 mg IV BID, titrate to UOP goals   -Goal UOP 2-3 L/24 hours   -I/O, daily weights  -Salt restrict, fluid restrict   -BID BMP, lytes replacement   -Lymphedema consult   -PT/OT/CORE      CAD status post CABG   HLD    Patient with history of multiple stents and CABG (LAD, PDA, OM) July of 2018, prolonged hospitalization due to weakness.   -Aspirin 81 mg daily   -Lipitor 40 mg daily   -Metoprolol 75 mg BID   -Naicin      Hypercapnia   The patient had increased somnolence on 4/27 in AM and found to have increased CO2 on VBG to 68. After placement of bipap she had improvement in cognition.      DM II   sliding scale insulin      CKD III   Creat  baseline 1.4-1.5. Patient with baseline creat here.   -BID BMP with diuresis      BELEN   -CPAP at bedtime      Other chronic issues:   COPD  Mononeuronitis  Vitamin D deficiency  Hypothyroid   Osteoarthritis      Patient seen and discussed w/ Dr. Junior Potter MD  Internal Medicine PGY-3  P: [phone]

## 2019-04-28 NOTE — PROGRESS NOTES
04/28/19 1124   General Information   Discipline OT   Onset of Edema   (acute on chronic)   Affected Body Part(s) Left LE;Right LE   Etiology Comments CKD 3, HF, decreased muscle pump   Pertinent history of current problem (PT: include personal factors and/or comorbidities that impact the POC; OT: include additional occupational profile info) Admit 4/25 with CHF exacerbation & pulmonary edema. PMHx of CAD s/p PCI and CABG in 2018, DM2, HLD, CKD Stage III, COPD, and HFpEF.   Edema Examination / Assessment   Skin Condition Comments Pt with significant edema in B LEs, bandaid over L great toe with pt reporting scheduled surgery for nail removal. Pt wearing compreflex custom compression garments, which pt reports being sized for ~3 months ago. Pt with lymphedema stage 2, firm 2+/3+ pitting from MTP to groin. Pt with intact, odorous skin, yellow bruising/discoloration on medial R LE. Pt with surgical scars on R LE and L LE.    Capillary Refill Symmetrical   Dorsal Pedal Pulse Symmetrical   ROM   Range of Motion (WFL)   (refer to OT eval)   Strength   Strength (WFL)   (refer to OT eval)   Sensation   Sensation (WFL)   (pt has constant neuropathy in B LEs)   Assessment/Plan   Patient presents with Stage 2 Lymphedema   Assessment Pt to benefit from skilled OT to aid in fluid mobilization. See daily flowsheet for details regarding tx provided.    Assessment of Occupational Performance   (refer to OT eval)   Planned Edema Interventions Gradient compression bandaging;Fit for compression garment;Exercises;Precautions to prevent infection/exacerbation;Education;ADL training   Treatment Frequency 5 times/wk   Treatment Duration 1 week   Patient, Family and/or Staff in agreement with plan of care. Yes   Risks and benefits of treatment have been explained. Yes   Total Evaluation Time   Total Evaluation Time (Minutes) 2

## 2019-04-28 NOTE — PROVIDER NOTIFICATION
Pt somnolent & disoriented. Refuses to open eyes, wear BIPAP, or take meds. Pt had similar episode yesterday morning. MD helped secure BIPAP on pt. VBGs came back with PCO2 elevated at 71. Recheck scheduled.

## 2019-04-28 NOTE — PLAN OF CARE
D: Admit 4/25 with CHF exacerbation & pulmonary edema. PMHx of CAD s/p PCI and CABG in 2018, DM2, HLD, CKD Stage III, COPD, and HFpEF.     I/A: Pt A/Ox4. VSS. SR/[place]. At 2300, pt somnolent & disoriented. Refused to open eyes, take meds, or obey commands. MD notified, pt taken off 2L NC & put on BIPAP. VBGs revealed CO2 elevated at 71, PCO2 decreased to 63 by 0140. Pt's cognition improved. BG at 0000 was 174, pt refusing insulin aspart. Standby assist.      P: Continue diuresis. Continue monitoring & notify Cards 1 of changes/concerns.

## 2019-04-28 NOTE — PLAN OF CARE
Discharge Planner OT   Patient plan for discharge: pt will like to discharge home with assist  Current status: Pt CGA-Min A with transfers and lower body dressing.  Pt endorses memory issues.  Pt completed UE exercises while sitting in chair.  Barriers to return to prior living situation: medical status, cognition  Recommendations for discharge: home with assist vs. TCU pending progress in therapy  Rationale for recommendations: Pt moving well, however endorses memory issues.       Entered by: Eden Hdz 04/28/2019 3:46 PM

## 2019-04-28 NOTE — PROGRESS NOTES
04/28/19 0923   Quick Adds   Type of Visit Initial Occupational Therapy Evaluation   Living Environment   Lives With other (see comments)  (Odalis)   Living Arrangements house   Home Accessibility stairs to enter home   Number of Stairs, Main Entrance 2   Stair Railings, Main Entrance railings on both sides of stairs   Transportation Anticipated family or friend will provide   Living Environment Comment Odalis does the grocery shopping, Odalis or mk will drive patient to appointment, pt has tub/shower and transfer bench, vanity next to toilet   Self-Care   Usual Activity Tolerance fair   Current Activity Tolerance fair   Regular Exercise Other (see comments)   Equipment Currently Used at Home cane, straight;grab bar, tub/shower;tub bench;walker, rolling;walker, standard   Activity/Exercise/Self-Care Comment Pt reports she tries to exercise, has exercise equipment.  Pt has SEC, pt has rolling walker for needed.   Functional Level   Ambulation 1-->assistive equipment   Transferring 1-->assistive equipment   Toileting 1-->assistive equipment   Bathing 3-->assistive equipment and person   Dressing 0-->independent   Eating 0-->independent   Communication 0-->understands/communicates without difficulty   Cognition 0 - no cognition issues reported   Fall history within last six months yes   Number of times patient has fallen within last six months 6  (Pt reports d/t L LE weakness)   Which of the above functional risks had a recent onset or change? fall history   Prior Functional Level Comment Pt reports that she can get herself dressed but if need to be done quickly than she needs help   General Information   Onset of Illness/Injury or Date of Surgery - Date 04/25/19   Referring Physician Samantha Cowan MD   Patient/Family Goals Statement to return home and to prior level of function   Additional Occupational Profile Info/Pertinent History of Current Problem decreased I with ADLs   Precautions/Limitations fall  precautions   General Observations pt presents with decreased I with ADLs, functional mobility and transfers   Cognitive Status Examination   Orientation orientation to person, place and time   Level of Consciousness alert;confused   Follows Commands (Cognition) WNL   Memory impaired   Attention No deficits were identified   Organization/Problem Solving No deficits were identified   Executive Function Cognitive flexibility impaired;Working memory impaired, decreased storage of information for performing tasks   Cognitive Comment Pt reports multiple concussion and feels she has memory loss.   Sensory Examination   Sensory Comments feet have neuropathyh   Pain Assessment   Patient Currently in Pain Yes, see Vital Sign flowsheet   Range of Motion (ROM)   ROM Comment limited ROM in L shoulder   Transfer Skills   Transfer Comments CGA-Min A   Instrumental Activities of Daily Living (IADL)   IADL Comments Pt's roomate does most chores, pt reports that she folds clothes   Activities of Daily Living Analysis   Impairments Contributing to Impaired Activities of Daily Living cognition impaired;pain   General Therapy Interventions   Planned Therapy Interventions ADL retraining;IADL retraining;bed mobility training;cognition;ROM;strengthening;transfer training   Clinical Impression   Criteria for Skilled Therapeutic Interventions Met yes, treatment indicated   OT Diagnosis decreased I with ADLs   Influenced by the following impairments medical status   Assessment of Occupational Performance 3-5 Performance Deficits   Identified Performance Deficits dressing, grooming, transfers, bathing   Clinical Decision Making (Complexity) Moderate complexity   Therapy Frequency daily   Predicted Duration of Therapy Intervention (days/wks) 1 week   Anticipated Discharge Disposition Home with Assist;Home with Home Therapy;Transitional Care Facility   Risks and Benefits of Treatment have been explained. Yes   Patient, Family & other staff in  "agreement with plan of care Yes   [facility] TM \"6 Clicks\"   2016, Trustees of [facility], under license to [employer].  All rights reserved.   6 Clicks Short Forms Daily Activity Inpatient Short Form   [facility]  \"6 Clicks\" Daily Activity Inpatient Short Form   1. Putting on and taking off regular lower body clothing? 3 - A Little   2. Bathing (including washing, rinsing, drying)? 3 - A Little   3. Toileting, which includes using toilet, bedpan or urinal? 3 - A Little   4. Putting on and taking off regular upper body clothing? 3 - A Little   5. Taking care of personal grooming such as brushing teeth? 3 - A Little   6. Eating meals? 4 - None   Daily Activity Raw Score (Score out of 24.Lower scores equate to lower levels of function) 19   Total Evaluation Time   Total Evaluation Time (Minutes) 8     "

## 2019-04-28 NOTE — PLAN OF CARE
Edema 6c: Pt admitted for increased B LE edema, 2+/3+ firm pitting present. GCB initiated from MTP to knee crease with plans to wear x 24 hours, however, please remove if compression causes numbness, tingling, pain, or becomes soiled. Will follow up.

## 2019-04-29 ENCOUNTER — APPOINTMENT (OUTPATIENT)
Dept: PHYSICAL THERAPY | Facility: CLINIC | Age: 74
DRG: 291 | End: 2019-04-29
Payer: MEDICARE

## 2019-04-29 ENCOUNTER — APPOINTMENT (OUTPATIENT)
Dept: OCCUPATIONAL THERAPY | Facility: CLINIC | Age: 74
DRG: 291 | End: 2019-04-29
Payer: MEDICARE

## 2019-04-29 LAB
ANION GAP SERPL CALCULATED.3IONS-SCNC: 7 MMOL/L (ref 3–14)
ANION GAP SERPL CALCULATED.3IONS-SCNC: 8 MMOL/L (ref 3–14)
BUN SERPL-MCNC: 46 MG/DL (ref 7–30)
BUN SERPL-MCNC: 50 MG/DL (ref 7–30)
CALCIUM SERPL-MCNC: 9.1 MG/DL (ref 8.5–10.1)
CALCIUM SERPL-MCNC: 9.1 MG/DL (ref 8.5–10.1)
CHLORIDE SERPL-SCNC: 100 MMOL/L (ref 94–109)
CHLORIDE SERPL-SCNC: 99 MMOL/L (ref 94–109)
CO2 SERPL-SCNC: 30 MMOL/L (ref 20–32)
CO2 SERPL-SCNC: 33 MMOL/L (ref 20–32)
CREAT SERPL-MCNC: 1.85 MG/DL (ref 0.52–1.04)
CREAT SERPL-MCNC: 1.86 MG/DL (ref 0.52–1.04)
ERYTHROCYTE [DISTWIDTH] IN BLOOD BY AUTOMATED COUNT: 13.2 % (ref 10–15)
GFR SERPL CREATININE-BSD FRML MDRD: 26 ML/MIN/{1.73_M2}
GFR SERPL CREATININE-BSD FRML MDRD: 27 ML/MIN/{1.73_M2}
GLUCOSE BLDC GLUCOMTR-MCNC: 120 MG/DL (ref 70–99)
GLUCOSE BLDC GLUCOMTR-MCNC: 148 MG/DL (ref 70–99)
GLUCOSE BLDC GLUCOMTR-MCNC: 150 MG/DL (ref 70–99)
GLUCOSE BLDC GLUCOMTR-MCNC: 186 MG/DL (ref 70–99)
GLUCOSE SERPL-MCNC: 145 MG/DL (ref 70–99)
GLUCOSE SERPL-MCNC: 165 MG/DL (ref 70–99)
HCT VFR BLD AUTO: 36.9 % (ref 35–47)
HGB BLD-MCNC: 11.2 G/DL (ref 11.7–15.7)
MAGNESIUM SERPL-MCNC: 2.9 MG/DL (ref 1.6–2.3)
MCH RBC QN AUTO: 28.2 PG (ref 26.5–33)
MCHC RBC AUTO-ENTMCNC: 30.4 G/DL (ref 31.5–36.5)
MCV RBC AUTO: 93 FL (ref 78–100)
PLATELET # BLD AUTO: 109 10E9/L (ref 150–450)
POTASSIUM SERPL-SCNC: 3.6 MMOL/L (ref 3.4–5.3)
POTASSIUM SERPL-SCNC: 3.7 MMOL/L (ref 3.4–5.3)
RBC # BLD AUTO: 3.97 10E12/L (ref 3.8–5.2)
SODIUM SERPL-SCNC: 139 MMOL/L (ref 133–144)
SODIUM SERPL-SCNC: 139 MMOL/L (ref 133–144)
WBC # BLD AUTO: 5.9 10E9/L (ref 4–11)

## 2019-04-29 PROCEDURE — 94660 CPAP INITIATION&MGMT: CPT

## 2019-04-29 PROCEDURE — 25000128 H RX IP 250 OP 636: Performed by: PHYSICIAN ASSISTANT

## 2019-04-29 PROCEDURE — 25000132 ZZH RX MED GY IP 250 OP 250 PS 637: Performed by: INTERNAL MEDICINE

## 2019-04-29 PROCEDURE — 40000556 ZZH STATISTIC PERIPHERAL IV START W US GUIDANCE

## 2019-04-29 PROCEDURE — A9270 NON-COVERED ITEM OR SERVICE: HCPCS | Performed by: STUDENT IN AN ORGANIZED HEALTH CARE EDUCATION/TRAINING PROGRAM

## 2019-04-29 PROCEDURE — 25000132 ZZH RX MED GY IP 250 OP 250 PS 637: Performed by: STUDENT IN AN ORGANIZED HEALTH CARE EDUCATION/TRAINING PROGRAM

## 2019-04-29 PROCEDURE — 00000146 ZZHCL STATISTIC GLUCOSE BY METER IP

## 2019-04-29 PROCEDURE — 85027 COMPLETE CBC AUTOMATED: CPT | Performed by: PHYSICIAN ASSISTANT

## 2019-04-29 PROCEDURE — A9270 NON-COVERED ITEM OR SERVICE: HCPCS | Performed by: INTERNAL MEDICINE

## 2019-04-29 PROCEDURE — 97535 SELF CARE MNGMENT TRAINING: CPT | Mod: GO

## 2019-04-29 PROCEDURE — 97530 THERAPEUTIC ACTIVITIES: CPT | Mod: GP

## 2019-04-29 PROCEDURE — 97116 GAIT TRAINING THERAPY: CPT | Mod: GP

## 2019-04-29 PROCEDURE — 21400000 ZZH R&B CCU UMMC

## 2019-04-29 PROCEDURE — 40000809 ZZH STATISTIC NO DOCUMENTATION TO SUPPORT CHARGE

## 2019-04-29 PROCEDURE — 99232 SBSQ HOSP IP/OBS MODERATE 35: CPT | Performed by: INTERNAL MEDICINE

## 2019-04-29 PROCEDURE — 97140 MANUAL THERAPY 1/> REGIONS: CPT | Mod: GO | Performed by: OCCUPATIONAL THERAPIST

## 2019-04-29 PROCEDURE — 97110 THERAPEUTIC EXERCISES: CPT | Mod: GP

## 2019-04-29 PROCEDURE — 36415 COLL VENOUS BLD VENIPUNCTURE: CPT | Performed by: PHYSICIAN ASSISTANT

## 2019-04-29 PROCEDURE — A9270 NON-COVERED ITEM OR SERVICE: HCPCS | Performed by: PHYSICIAN ASSISTANT

## 2019-04-29 PROCEDURE — 80048 BASIC METABOLIC PNL TOTAL CA: CPT | Performed by: PHYSICIAN ASSISTANT

## 2019-04-29 PROCEDURE — 25000132 ZZH RX MED GY IP 250 OP 250 PS 637: Performed by: PHYSICIAN ASSISTANT

## 2019-04-29 PROCEDURE — 83735 ASSAY OF MAGNESIUM: CPT | Performed by: PHYSICIAN ASSISTANT

## 2019-04-29 RX ORDER — NYSTATIN 100000 U/G
CREAM TOPICAL 2 TIMES DAILY
Status: DISCONTINUED | OUTPATIENT
Start: 2019-04-29 | End: 2019-04-29

## 2019-04-29 RX ORDER — BUMETANIDE 0.25 MG/ML
2 INJECTION INTRAMUSCULAR; INTRAVENOUS
Status: DISCONTINUED | OUTPATIENT
Start: 2019-04-29 | End: 2019-05-02

## 2019-04-29 RX ADMIN — METOPROLOL TARTRATE 75 MG: 25 TABLET ORAL at 09:30

## 2019-04-29 RX ADMIN — INSULIN ASPART 1 UNITS: 100 INJECTION, SOLUTION INTRAVENOUS; SUBCUTANEOUS at 03:36

## 2019-04-29 RX ADMIN — LOSARTAN POTASSIUM 50 MG: 50 TABLET ORAL at 22:19

## 2019-04-29 RX ADMIN — ATORVASTATIN CALCIUM 40 MG: 40 TABLET, FILM COATED ORAL at 20:15

## 2019-04-29 RX ADMIN — PREGABALIN 100 MG: 100 CAPSULE ORAL at 20:15

## 2019-04-29 RX ADMIN — POTASSIUM CHLORIDE 20 MEQ: 750 TABLET, EXTENDED RELEASE ORAL at 09:31

## 2019-04-29 RX ADMIN — SENNOSIDES AND DOCUSATE SODIUM 1 TABLET: 8.6; 5 TABLET ORAL at 09:31

## 2019-04-29 RX ADMIN — MICONAZOLE NITRATE: 20 CREAM TOPICAL at 22:01

## 2019-04-29 RX ADMIN — LOSARTAN POTASSIUM 25 MG: 25 TABLET, FILM COATED ORAL at 09:32

## 2019-04-29 RX ADMIN — FOLIC ACID 1 MG: 1 TABLET ORAL at 09:32

## 2019-04-29 RX ADMIN — MICONAZOLE NITRATE: 20 CREAM TOPICAL at 09:30

## 2019-04-29 RX ADMIN — ASPIRIN 81 MG CHEWABLE TABLET 81 MG: 81 TABLET CHEWABLE at 09:32

## 2019-04-29 RX ADMIN — INSULIN ASPART 1 UNITS: 100 INJECTION, SOLUTION INTRAVENOUS; SUBCUTANEOUS at 09:33

## 2019-04-29 RX ADMIN — BUMETANIDE 2 MG: 0.25 INJECTION INTRAMUSCULAR; INTRAVENOUS at 16:38

## 2019-04-29 RX ADMIN — NIACIN ER 500 MG TABLET,EXTENDED RELEASE 500 MG: at 12:14

## 2019-04-29 RX ADMIN — ESCITALOPRAM 20 MG: 20 TABLET, FILM COATED ORAL at 09:32

## 2019-04-29 RX ADMIN — FERROUS GLUCONATE 324 MG: 324 TABLET ORAL at 09:32

## 2019-04-29 RX ADMIN — POTASSIUM CHLORIDE 40 MEQ: 750 TABLET, EXTENDED RELEASE ORAL at 09:33

## 2019-04-29 RX ADMIN — INSULIN ASPART 1 UNITS: 100 INJECTION, SOLUTION INTRAVENOUS; SUBCUTANEOUS at 23:17

## 2019-04-29 RX ADMIN — PREGABALIN 100 MG: 100 CAPSULE ORAL at 09:32

## 2019-04-29 RX ADMIN — METOPROLOL TARTRATE 75 MG: 25 TABLET ORAL at 20:14

## 2019-04-29 RX ADMIN — ACETAMINOPHEN 975 MG: 325 TABLET, FILM COATED ORAL at 20:14

## 2019-04-29 RX ADMIN — TRAZODONE HYDROCHLORIDE 150 MG: 100 TABLET ORAL at 22:19

## 2019-04-29 RX ADMIN — LEVOTHYROXINE SODIUM 150 MCG: 150 TABLET ORAL at 09:32

## 2019-04-29 RX ADMIN — SENNOSIDES AND DOCUSATE SODIUM 1 TABLET: 8.6; 5 TABLET ORAL at 20:14

## 2019-04-29 RX ADMIN — BUMETANIDE 2 MG: 0.25 INJECTION INTRAMUSCULAR; INTRAVENOUS at 09:31

## 2019-04-29 RX ADMIN — ACETAMINOPHEN 975 MG: 325 TABLET, FILM COATED ORAL at 09:30

## 2019-04-29 RX ADMIN — LOSARTAN POTASSIUM 50 MG: 50 TABLET ORAL at 00:46

## 2019-04-29 ASSESSMENT — ACTIVITIES OF DAILY LIVING (ADL)
ADLS_ACUITY_SCORE: 22

## 2019-04-29 ASSESSMENT — PAIN DESCRIPTION - DESCRIPTORS
DESCRIPTORS: SHARP

## 2019-04-29 ASSESSMENT — MIFFLIN-ST. JEOR: SCORE: 2016.69

## 2019-04-29 NOTE — PROGRESS NOTES
Social Work: Assessment with Discharge Plan    Patient Name:  Mariel Palmer  :  1945  Age:  73 year old  MRN:  [id number]  Risk/Complexity Score:     Completed assessment with:  Pt    Presenting Information   Reason for Referral:  Discharge plan  Date of Intake:  2019  Referral Source:  Chart Review  Decision Maker:  Pt when able  Alternate Decision Maker:  Healthcare agent on file is Odalis Lowry (friend) and roommate of the pt.  Health Care Directive:  Copy in Chart  Living Situation:  House  Previous Functional Status:  Independent with some assistance from Odalis.  Odalis works full time outside of the home.  Patient and family understanding of hospitalization:  Pt states that she wants to talk with Odalis about TCU versus a discharge to home.  Cultural/Language/Spiritual Considerations:  None reported.  Adjustment to Illness:  Pt adjusting appropriately.    Physical Health  Reason for Admission:    1. Pulmonary edema with congestive heart failure (H)      Services Needed/Recommended:  TCU vs home with services.    Mental Health/Chemical Dependency  Diagnosis:  None reported  Support/Services in Place:  None  Services Needed/Recommended:  None     Support System  Significant relationship at present time:  Roommate is Odalis, pt states Odalis is a primary caregiver for information.  Family of origin is available for support:  Unclear  Other support available:  Yes  Gaps in support system:  Pt agreeable to rehab placement post hospitalization.  Patient is caregiver to:  None, pt reports Odalis is caring for their dog.    Provider Information   Primary Care Physician:  Rafaela William   [phone]   Clinic:  [address] / [place] [address]      :  None    Financial   Income Source:  Disability  Financial Concerns:  None, pt states she and Odalis co manage finances.  Insurance:    Payor/Plan Subscriber Name Rel Member # Group #   MEDICARE - MEDICARE MARIEL PALMER   [id number]       ATTN CLAIMS, [address]   COMMERCIAL - AARP ROSY PALMER  [id number]      [facility] CLAIM DIV, [address]       Discharge Plan   Patient and family discharge goal:  TCU vs home pending rehab outcomes and discussion with Odalis.  Provided education on discharge plan:  YES  Patient agreeable to discharge plan:  YES  A list of Medicare Certified Facilities was provided to the patient and/or family to encourage patient choice. Patient's choices for facility are:    [facility]  PH: [phone]  F: [phone]    Will NH provide Skilled rehabilitation or complex medical:  YES  General information regarding anticipated insurance coverage and possible out of pocket cost was discussed. Patient and patient's family are aware patient may incur the cost of transportation to the facility, pending insurance payment: YES  Barriers to discharge:  Medical stability, rehab needs    Discharge Recommendations   Anticipated Disposition:  Facility:  TCU if necessary  Transportation Needs:  Family:  Odalis as able.  Name of Transportation Company and Phone:  TBD    Additional comments   MARCO met with pt to introduce self and role in consult.  Pt will plan to talk with Odalis and MARCO offered to also call if needed for additional information.  MARCO sent referral to [facility] to add pt to the queue for review of placement.  Pt will decide if she wants to continue the referral or discharge to home.  [facility] notes potential openings pending review.    SW to follow if pt chooses TCU and is recommended for TCU at discharge.    SAMANTHA Baptiste  [address]   Phone: [phone]  Pager: [phone]  Unit: [phone]

## 2019-04-29 NOTE — PLAN OF CARE
D: Admit 4/25 with CHF exacerbation & pulmonary edema. PMHx of CAD s/p PCI and CABG in 2018, DM2, HLD, CKD Stage III, COPD, and HFpEF.     I/A: Pt A/Ox4. At times lethargic/confused. VSS. SR/S[place]. Sating well on Bipap, off NC. PRN tylenol given for generalized pain. Mg+ replacement given. Vascular had trouble finding access on R. arm, swelling present. 2nd L. PIV placed d/t pt reporting burning/discomort in lower L. PIV. R. Groin moist/pink, antifungal cream as needed. Lymph wraps on LE, 3+ edema. BG checks Q4H, this could be changed to AC/HS. Standby assist. *Pt would like to talk to care coordinator about getting pre-existing appts rescheduled.      P: Diuresis likely resuming today. Continue monitoring & notify Cards 1 of changes/concerns.

## 2019-04-29 NOTE — PROGRESS NOTES
"  Cardiology Progress Note  Subjective:  No acute changes overnight. Patient feels well this morning     Objective:  Most recent vital signs:  /56 (BP Location: Right arm)   Pulse 73   Temp 97.4  F (36.3  C) (Oral)   Resp 16   Ht 1.689 m (5' 6.5\")   Wt 148.2 kg (326 lb 11.6 oz)   SpO2 94%   BMI 51.94 kg/m    Temp:  [97.4  F (36.3  C)-98.8  F (37.1  C)] 97.4  F (36.3  C)  Heart Rate:  [54-72] 63  Resp:  [16-18] 16  BP: (101-161)/(35-68) 114/56  SpO2:  [91 %-98 %] 94 %  Wt Readings from Last 2 Encounters:   04/29/19 148.2 kg (326 lb 11.6 oz)   04/09/19 149.7 kg (330 lb)       Intake/Output Summary (Last 24 hours) at 4/28/2019 1343  Last data filed at 4/28/2019 0852  Gross per 24 hour   Intake 260 ml   Output 1250 ml   Net -990 ml     Physical exam:  General: In bed, in NAD  HEENT: EOMI, no scleral icterus or injection  CARDIAC: RRR, no m/r/g appreciated. Peripheral pulses 2+  RESP: CTAB, no wheezes, crackles appreciated in lung bases, normal WOB  GI: NABS, NT obese abdomen, no guarding or rebound  EXTREMITIES:  Lymphedema wrapping.  SKIN: No acute lesions appreciated  NEURO: Alert and oriented X3, CN II-XII grossly intact, no focal neurological deficits noted  Endocrine: no obvious thyromegaly  Musculoskeletal: no joint swelling or tenderness  Psych: pleasant and conversant      Labs (Past three days):  CBC  Recent Labs   Lab 04/29/19  0426 04/28/19  0553 04/27/19  0602 04/26/19  0528   WBC 5.9 6.8 5.7 7.4   RBC 3.97 4.07 4.12 4.34   HGB 11.2* 11.3* 11.5* 12.2   HCT 36.9 37.2 37.9 40.4   MCV 93 91 92 93   MCH 28.2 27.8 27.9 28.1   MCHC 30.4* 30.4* 30.3* 30.2*   RDW 13.2 13.3 13.5 13.7   * 116* 97* 136*     BMP  Recent Labs   Lab 04/29/19  0426 04/28/19  1714 04/28/19  0553 04/27/19  1547    138 139 139   POTASSIUM 3.6 3.8 3.7 4.0   CHLORIDE 100 99 102 101   CO2 30 30 29 33*   ANIONGAP 8 9 8 6   * 196* 157* 132*   BUN 46* 44* 40* 34*   CR 1.86* 2.01* 1.88* 1.81*   GFRESTIMATED 26* 24* " 26* 27*   GFRESTBLACK 31* 28* 30* 32*   ANTOINETTE 9.1 9.0 9.1 9.7   MAG 2.9* 1.5* 1.6 1.5*     Troponins:     INRNo lab results found in last 7 days.  Liver panelNo lab results found in last 7 days.    Assessment & Plan:  73 year old with a PMHx of CAD s/p PCI and CABG in 2018, DM2, HLD, CKD Stage III, COPD, and HFpEF. She is admitted with shortness of breath and weight gain. She is found to have pulmonary vascular congestion on a CXR. EDW close to or below 300 lbs, Patient 338 on admission.      Changes today  - Continue Bumex 2 mg IV BID  - Weaning off Nasal cannula oxygen, off oxygen while in the chair and speaking in full sentences    HFpEF   Fluid Overload   Shortness of breath   Patient presents with progressively worsening SOB and orthopnea, weight gain of approximately 30 lbs, and edema in her lower extremities/abdomen. CXR remarkable for pulmonary edema. She has had admissions for HFpEF and fluid overload following her CABG in 2018. She has dietary indiscretion and non-compliance using bumex 2mg q48. No other obvious etiology for her shortness of breath. NO EKG changes and trop negative so low suspicion for ACS though we will get an echo. No evidence of PNA such as leukocytosis or focal consolidation on CXR. A VQ scan was negative for PE and the progress of her SOB has been insidious rather than abrupt.  Echo showed EF decrease to 35-40% from 40-45% with dilated IVC. She was given metolazone on admission for increased diuresis.  -Bumex 2 mg IV BID, titrate to UOP goals   -Goal UOP 2-3 L/24 hours   -I/O, daily weights  -Salt restrict, fluid restrict   -BID BMP, lytes replacement   -Lymphedema consult   -PT/OT/CORE      CAD status post CABG   HLD    Patient with history of multiple stents and CABG (LAD, PDA, OM) July of 2018, prolonged hospitalization due to weakness.   -Aspirin 81 mg daily   -Lipitor 40 mg daily   -Metoprolol 75 mg BID   -Naicin      Hypercapnia   The patient had increased somnolence on 4/27 in AM  and found to have increased CO2 on VBG to 68. After placement of bipap she had improvement in cognition.      DM II   sliding scale insulin      CKD III   Creat baseline 1.4-1.5. Patient with baseline creat here.   -BID BMP with diuresis      BELEN   -CPAP at bedtime      Other chronic issues:   COPD  Mononeuronitis  Vitamin D deficiency  Hypothyroid   Osteoarthritis      Patient seen and discussed w/ Dr. Trini Bishop PA-C  [facility] Cardiology   Pager [phone]    ATTENDING ATTESTATION  Patient was seen by me on April 29, 2019 in conjunction with Tirso Bishop PA-C as part of a shared visit.    I have personally reviewed medical history, medication list, relevant cardiac and laboratory data. I have examined the patient and edited the note as necessary to reflect our joint assessment and plan.    Sultana Feliz MD, MS  Staff Cardiologist, [facility]   Pager: [phone]

## 2019-04-29 NOTE — PLAN OF CARE
Edema 6C: Recommend continued use of compression wraps to B LEs for further management of chronic LE edema. Application of GCB from MTPs to knee creases. Remove wraps if causing pain/numbness or become soiled.

## 2019-04-29 NOTE — PLAN OF CARE
D: Admitted 4/25 with CHF exacerbation and pulmonary edema.  Hx of CAD s/p PCI and CABG in 2018, DM2, HLD, CKD stage III, COPD, and HF.    I: Monitored vitals and assessed pt status.   Running: Mag 4g- pt c/o burning at site, slowed infusion. Vascular access came to place new PIV- unable to do so with ultrasound. Now R arm swollen d/t attempts- continue to monitor.  PRN: electrolyte replacement protocol    A: A0x4- sometimes has episodes of confusion/seems a bit out of it- pt aware, sx aware, could be related to high CO2 levels, improving with Bipap use. BP softer for evening shift- 117/49 last check- losartan held this evening. C2 aware. Pt c/o lightheadedness with activity/position changes, so held this evening, continue to monitor. Diuretic holiday. Will restart possibly tomorrow. Creat continues to rise- 2.02 this evening. Mag 1.5- currently being replaced. K 3.8- replaced with 20mEq per protocol. Last BG check 160- q4hr, this could possibly be changed in AM to AC/HS. Eating well. Voiding adequately. BM x1 this evening. Lymph wraps on LE- continues to have +3 edema to LE, and +2-3 in upper extremities. R groin fold with red/irritated fungal rash- treated with antifungal cream. Nose bleed this evening- sx aware. Bipap at night/while sleeping. 2-3L NC with humidification while awake- trying to wean. Pt pleasant, cooperative.   *Pt has an appointment with core clinic tomorrow and podiatry on Tuesday- pt would like to talk to care coordinator about getting appointments changed/rescheduled.     P: Wean O2 as able. Continue to diurese tomorrow. Continue to monitor BP/lightheadedness/dizziness. Continue to monitor Pt status and report changes to treatment team.

## 2019-04-29 NOTE — PLAN OF CARE
Discharge Planner PT   Patient plan for discharge: home  Current status: pt demos indep with sit<>stand transfers. Ambulates using 4WW and cane with SBA. Engaged in 10 mins of aerobic exercise. Pt with episode of lightheadedness after exercise this day, needing seated break.   Barriers to return to prior living situation: medical stability  Recommendations for discharge: home with home therapies         Entered by: Chantal Fairbanks 04/29/2019 4:35 PM

## 2019-04-29 NOTE — PLAN OF CARE
Discharge Planner OT  [facility] [place]  Patient plan for discharge: did not discuss today  Current status: Facilitated increased standing tolerance with ADLs. Pt completed functional mobility of ~10ft to/from bathroom using SEC with CGA-SBA. Pt demonstrated standing/seated ADLs of oral care, hair combing, and face washing at sink with set-up and SBA. Pt tolerated ~3 minutes of standing ADLs at sink before needing to sit down due to fatigue and lower back pain.  Barriers to return to prior living situation: medical status, deconditioning  Recommendations for discharge: Per plan established by the OT, the recommendation for dc location is home with assist.  Rationale for recommendations: Although pt requiring increase assist with I/ADLs today, pt is close to reaching functional baseline. Pt will benefit from continued therapy to increase strength and activity tolerance for ADLs to get pt back to PLOF.       Entered by: Evette Tello 04/29/2019 9:18 AM

## 2019-04-29 NOTE — CONSULTS
"Mariel is already an established patient in the CORE/Heart Failure clinic.  Last appt with was Starla Saez NP in CORE on 18.  I spoke with her at the bedside today about her discharge plans.  I have scheduled Mariel a Return CORE/heart failure appt with Starla Saez NP 19, with labs at 1430.       She was instructed on the importance of daily weights, 2 gm sodium diet, 2L fluid restriction and compliance with medications upon hospital discharge.  I instructed Mariel to call with any questions or concerns, including any weight gain or loss of 2 or more pounds in 24 hours or 5 or more pounds in 1 week. I will follow-up with Mariel at the time of appt, sooner if needed.  Thank you for the consult.       Latonia Hdz RN BSN  Cardiology Care Coordinator - Heart Failure/ [facility].R[facility] Clinic  [facility]   Questions and [phone]   First press #1 for the [employer] and then press #3 for \"Medical Advise\" to reach us Cardiology Nurses.        "

## 2019-04-30 ENCOUNTER — APPOINTMENT (OUTPATIENT)
Dept: OCCUPATIONAL THERAPY | Facility: CLINIC | Age: 74
DRG: 291 | End: 2019-04-30
Payer: MEDICARE

## 2019-04-30 LAB
ALBUMIN UR-MCNC: NEGATIVE MG/DL
ANION GAP SERPL CALCULATED.3IONS-SCNC: 10 MMOL/L (ref 3–14)
ANION GAP SERPL CALCULATED.3IONS-SCNC: 8 MMOL/L (ref 3–14)
APPEARANCE UR: CLEAR
BASE EXCESS BLDV CALC-SCNC: 7.3 MMOL/L
BILIRUB UR QL STRIP: NEGATIVE
BUN SERPL-MCNC: 51 MG/DL (ref 7–30)
BUN SERPL-MCNC: 52 MG/DL (ref 7–30)
CALCIUM SERPL-MCNC: 9.3 MG/DL (ref 8.5–10.1)
CALCIUM SERPL-MCNC: 9.8 MG/DL (ref 8.5–10.1)
CHLORIDE SERPL-SCNC: 98 MMOL/L (ref 94–109)
CHLORIDE SERPL-SCNC: 99 MMOL/L (ref 94–109)
CO2 SERPL-SCNC: 30 MMOL/L (ref 20–32)
CO2 SERPL-SCNC: 34 MMOL/L (ref 20–32)
COLOR UR AUTO: ABNORMAL
CREAT SERPL-MCNC: 1.64 MG/DL (ref 0.52–1.04)
CREAT SERPL-MCNC: 1.79 MG/DL (ref 0.52–1.04)
ERYTHROCYTE [DISTWIDTH] IN BLOOD BY AUTOMATED COUNT: 12.9 % (ref 10–15)
ERYTHROCYTE [DISTWIDTH] IN BLOOD BY AUTOMATED COUNT: NORMAL % (ref 10–15)
GFR SERPL CREATININE-BSD FRML MDRD: 28 ML/MIN/{1.73_M2}
GFR SERPL CREATININE-BSD FRML MDRD: 31 ML/MIN/{1.73_M2}
GLUCOSE BLDC GLUCOMTR-MCNC: 143 MG/DL (ref 70–99)
GLUCOSE BLDC GLUCOMTR-MCNC: 143 MG/DL (ref 70–99)
GLUCOSE BLDC GLUCOMTR-MCNC: 160 MG/DL (ref 70–99)
GLUCOSE BLDC GLUCOMTR-MCNC: 178 MG/DL (ref 70–99)
GLUCOSE BLDC GLUCOMTR-MCNC: 236 MG/DL (ref 70–99)
GLUCOSE BLDC GLUCOMTR-MCNC: 237 MG/DL (ref 70–99)
GLUCOSE SERPL-MCNC: 139 MG/DL (ref 70–99)
GLUCOSE SERPL-MCNC: 149 MG/DL (ref 70–99)
GLUCOSE UR STRIP-MCNC: NEGATIVE MG/DL
HBA1C MFR BLD: 6.5 % (ref 0–5.6)
HCO3 BLDV-SCNC: 33 MMOL/L (ref 21–28)
HCT VFR BLD AUTO: 36.4 % (ref 35–47)
HCT VFR BLD AUTO: NORMAL % (ref 35–47)
HGB BLD-MCNC: 11.2 G/DL (ref 11.7–15.7)
HGB BLD-MCNC: NORMAL G/DL (ref 11.7–15.7)
HGB UR QL STRIP: NEGATIVE
HYALINE CASTS #/AREA URNS LPF: 7 /LPF (ref 0–2)
KETONES UR STRIP-MCNC: NEGATIVE MG/DL
LEUKOCYTE ESTERASE UR QL STRIP: ABNORMAL
MCH RBC QN AUTO: 28.1 PG (ref 26.5–33)
MCH RBC QN AUTO: NORMAL PG (ref 26.5–33)
MCHC RBC AUTO-ENTMCNC: 30.8 G/DL (ref 31.5–36.5)
MCHC RBC AUTO-ENTMCNC: NORMAL G/DL (ref 31.5–36.5)
MCV RBC AUTO: 92 FL (ref 78–100)
MCV RBC AUTO: NORMAL FL (ref 78–100)
MUCOUS THREADS #/AREA URNS LPF: PRESENT /LPF
NITRATE UR QL: NEGATIVE
O2/TOTAL GAS SETTING VFR VENT: 21 %
PCO2 BLDV: 49 MM HG (ref 40–50)
PH BLDV: 7.43 PH (ref 7.32–7.43)
PH UR STRIP: 5.5 PH (ref 5–7)
PLATELET # BLD AUTO: 105 10E9/L (ref 150–450)
PLATELET # BLD AUTO: NORMAL 10E9/L (ref 150–450)
PO2 BLDV: 48 MM HG (ref 25–47)
POTASSIUM SERPL-SCNC: 3.8 MMOL/L (ref 3.4–5.3)
POTASSIUM SERPL-SCNC: 3.8 MMOL/L (ref 3.4–5.3)
RBC # BLD AUTO: 3.98 10E12/L (ref 3.8–5.2)
RBC # BLD AUTO: NORMAL 10E12/L (ref 3.8–5.2)
RBC #/AREA URNS AUTO: <1 /HPF (ref 0–2)
SODIUM SERPL-SCNC: 139 MMOL/L (ref 133–144)
SODIUM SERPL-SCNC: 140 MMOL/L (ref 133–144)
SOURCE: ABNORMAL
SP GR UR STRIP: 1.01 (ref 1–1.03)
TRANS CELLS #/AREA URNS HPF: 1 /HPF (ref 0–1)
UROBILINOGEN UR STRIP-MCNC: NORMAL MG/DL (ref 0–2)
WBC # BLD AUTO: 5.4 10E9/L (ref 4–11)
WBC # BLD AUTO: NORMAL 10E9/L (ref 4–11)
WBC #/AREA URNS AUTO: 3 /HPF (ref 0–5)

## 2019-04-30 PROCEDURE — 25000132 ZZH RX MED GY IP 250 OP 250 PS 637: Performed by: STUDENT IN AN ORGANIZED HEALTH CARE EDUCATION/TRAINING PROGRAM

## 2019-04-30 PROCEDURE — A9270 NON-COVERED ITEM OR SERVICE: HCPCS | Performed by: STUDENT IN AN ORGANIZED HEALTH CARE EDUCATION/TRAINING PROGRAM

## 2019-04-30 PROCEDURE — 82803 BLOOD GASES ANY COMBINATION: CPT | Performed by: PHYSICIAN ASSISTANT

## 2019-04-30 PROCEDURE — 80048 BASIC METABOLIC PNL TOTAL CA: CPT | Performed by: PHYSICIAN ASSISTANT

## 2019-04-30 PROCEDURE — 25000128 H RX IP 250 OP 636: Performed by: PHYSICIAN ASSISTANT

## 2019-04-30 PROCEDURE — 36415 COLL VENOUS BLD VENIPUNCTURE: CPT | Performed by: PHYSICIAN ASSISTANT

## 2019-04-30 PROCEDURE — 99232 SBSQ HOSP IP/OBS MODERATE 35: CPT | Performed by: PHYSICIAN ASSISTANT

## 2019-04-30 PROCEDURE — 25000132 ZZH RX MED GY IP 250 OP 250 PS 637: Performed by: INTERNAL MEDICINE

## 2019-04-30 PROCEDURE — A9270 NON-COVERED ITEM OR SERVICE: HCPCS | Performed by: PHYSICIAN ASSISTANT

## 2019-04-30 PROCEDURE — 83036 HEMOGLOBIN GLYCOSYLATED A1C: CPT | Performed by: INTERNAL MEDICINE

## 2019-04-30 PROCEDURE — 97110 THERAPEUTIC EXERCISES: CPT | Mod: GO

## 2019-04-30 PROCEDURE — 81001 URINALYSIS AUTO W/SCOPE: CPT | Performed by: PHYSICIAN ASSISTANT

## 2019-04-30 PROCEDURE — 21400000 ZZH R&B CCU UMMC

## 2019-04-30 PROCEDURE — 25000132 ZZH RX MED GY IP 250 OP 250 PS 637: Performed by: PHYSICIAN ASSISTANT

## 2019-04-30 PROCEDURE — 87086 URINE CULTURE/COLONY COUNT: CPT | Performed by: PHYSICIAN ASSISTANT

## 2019-04-30 PROCEDURE — A9270 NON-COVERED ITEM OR SERVICE: HCPCS | Performed by: INTERNAL MEDICINE

## 2019-04-30 PROCEDURE — 94660 CPAP INITIATION&MGMT: CPT

## 2019-04-30 PROCEDURE — 85027 COMPLETE CBC AUTOMATED: CPT | Performed by: INTERNAL MEDICINE

## 2019-04-30 PROCEDURE — 36415 COLL VENOUS BLD VENIPUNCTURE: CPT | Performed by: INTERNAL MEDICINE

## 2019-04-30 PROCEDURE — 00000146 ZZHCL STATISTIC GLUCOSE BY METER IP

## 2019-04-30 PROCEDURE — 97140 MANUAL THERAPY 1/> REGIONS: CPT | Mod: GO | Performed by: OCCUPATIONAL THERAPIST

## 2019-04-30 PROCEDURE — 97535 SELF CARE MNGMENT TRAINING: CPT | Mod: GO

## 2019-04-30 PROCEDURE — 40000275 ZZH STATISTIC RCP TIME EA 10 MIN

## 2019-04-30 RX ORDER — DEXTROSE MONOHYDRATE 25 G/50ML
25-50 INJECTION, SOLUTION INTRAVENOUS
Status: DISCONTINUED | OUTPATIENT
Start: 2019-04-30 | End: 2019-04-30

## 2019-04-30 RX ORDER — LIDOCAINE 4 G/G
1 PATCH TOPICAL
Status: DISCONTINUED | OUTPATIENT
Start: 2019-04-30 | End: 2019-05-03 | Stop reason: HOSPADM

## 2019-04-30 RX ORDER — GUAIFENESIN 600 MG/1
600 TABLET, EXTENDED RELEASE ORAL 2 TIMES DAILY
Status: DISCONTINUED | OUTPATIENT
Start: 2019-04-30 | End: 2019-05-03 | Stop reason: HOSPADM

## 2019-04-30 RX ORDER — METOLAZONE 5 MG/1
5 TABLET ORAL ONCE
Status: DISCONTINUED | OUTPATIENT
Start: 2019-04-30 | End: 2019-04-30

## 2019-04-30 RX ORDER — NICOTINE POLACRILEX 4 MG
15-30 LOZENGE BUCCAL
Status: DISCONTINUED | OUTPATIENT
Start: 2019-04-30 | End: 2019-04-30

## 2019-04-30 RX ADMIN — MICONAZOLE NITRATE: 20 CREAM TOPICAL at 20:25

## 2019-04-30 RX ADMIN — ACETAMINOPHEN 975 MG: 325 TABLET, FILM COATED ORAL at 21:53

## 2019-04-30 RX ADMIN — LEVOTHYROXINE SODIUM 150 MCG: 150 TABLET ORAL at 09:10

## 2019-04-30 RX ADMIN — LOSARTAN POTASSIUM 25 MG: 25 TABLET, FILM COATED ORAL at 09:11

## 2019-04-30 RX ADMIN — PREGABALIN 100 MG: 100 CAPSULE ORAL at 09:10

## 2019-04-30 RX ADMIN — POTASSIUM CHLORIDE 20 MEQ: 750 TABLET, EXTENDED RELEASE ORAL at 09:13

## 2019-04-30 RX ADMIN — TRAZODONE HYDROCHLORIDE 150 MG: 100 TABLET ORAL at 21:47

## 2019-04-30 RX ADMIN — BISACODYL 5 MG: 5 TABLET, COATED ORAL at 09:09

## 2019-04-30 RX ADMIN — METOPROLOL TARTRATE 75 MG: 25 TABLET ORAL at 20:18

## 2019-04-30 RX ADMIN — ACETAMINOPHEN 975 MG: 325 TABLET, FILM COATED ORAL at 09:08

## 2019-04-30 RX ADMIN — BUMETANIDE 2 MG: 0.25 INJECTION INTRAMUSCULAR; INTRAVENOUS at 16:24

## 2019-04-30 RX ADMIN — POTASSIUM CHLORIDE 40 MEQ: 750 TABLET, EXTENDED RELEASE ORAL at 09:10

## 2019-04-30 RX ADMIN — MICONAZOLE NITRATE: 20 CREAM TOPICAL at 09:09

## 2019-04-30 RX ADMIN — ATORVASTATIN CALCIUM 40 MG: 40 TABLET, FILM COATED ORAL at 20:19

## 2019-04-30 RX ADMIN — POTASSIUM CHLORIDE 20 MEQ: 750 TABLET, EXTENDED RELEASE ORAL at 17:29

## 2019-04-30 RX ADMIN — INSULIN ASPART 1 UNITS: 100 INJECTION, SOLUTION INTRAVENOUS; SUBCUTANEOUS at 03:37

## 2019-04-30 RX ADMIN — ASPIRIN 81 MG CHEWABLE TABLET 81 MG: 81 TABLET CHEWABLE at 09:09

## 2019-04-30 RX ADMIN — SENNOSIDES AND DOCUSATE SODIUM 2 TABLET: 8.6; 5 TABLET ORAL at 09:11

## 2019-04-30 RX ADMIN — METOPROLOL TARTRATE 75 MG: 25 TABLET ORAL at 09:10

## 2019-04-30 RX ADMIN — BUMETANIDE 2 MG: 0.25 INJECTION INTRAMUSCULAR; INTRAVENOUS at 09:09

## 2019-04-30 RX ADMIN — NIACIN ER 500 MG TABLET,EXTENDED RELEASE 500 MG: at 09:11

## 2019-04-30 RX ADMIN — FOLIC ACID 1 MG: 1 TABLET ORAL at 09:11

## 2019-04-30 RX ADMIN — GUAIFENESIN 600 MG: 600 TABLET, EXTENDED RELEASE ORAL at 21:47

## 2019-04-30 RX ADMIN — ESCITALOPRAM 20 MG: 20 TABLET, FILM COATED ORAL at 09:10

## 2019-04-30 RX ADMIN — LIDOCAINE 1 PATCH: 560 PATCH PERCUTANEOUS; TOPICAL; TRANSDERMAL at 20:18

## 2019-04-30 RX ADMIN — LOSARTAN POTASSIUM 50 MG: 50 TABLET ORAL at 21:47

## 2019-04-30 RX ADMIN — PREGABALIN 100 MG: 100 CAPSULE ORAL at 20:18

## 2019-04-30 ASSESSMENT — PAIN DESCRIPTION - DESCRIPTORS
DESCRIPTORS: SHARP
DESCRIPTORS: SHARP
DESCRIPTORS: SORE;SHARP
DESCRIPTORS: SHARP

## 2019-04-30 ASSESSMENT — ACTIVITIES OF DAILY LIVING (ADL)
ADLS_ACUITY_SCORE: 21
ADLS_ACUITY_SCORE: 20

## 2019-04-30 ASSESSMENT — MIFFLIN-ST. JEOR: SCORE: 2009.33

## 2019-04-30 NOTE — PLAN OF CARE
"/52   Pulse 73   Temp 97.7  F (36.5  C) (Oral)   Resp 16   Ht 1.689 m (5' 6.5\")   Wt 148.2 kg (326 lb 11.6 oz)   SpO2 98%   BMI 51.94 kg/m      Shift: 6284-7490  Neuro: A&Ox4, lethargic.  Cardiac: Bradycardia in 50s. VSS.   Respiratory: Tolerating RA throughout day. Cpap on overnight.  GI/: Adequate urine output.  Diet/appetite: Tolerating 2g Na diet. BG stable with insulin coverage as ordered.  Activity:  Assist of 1 with cane.  Pain: Using heat for chronic back pain.  Skin: Intact.  Lines: PIV SL    R: Continue with POC. Notify primary team with changes.   "

## 2019-04-30 NOTE — PLAN OF CARE
D: Pt admitted on 4/25 for worsening SOB and weight gain      I: Monitored vitals and assessed pt status.     PRN: tylenol     A:   Temp: 97.8  F (36.6  C) Temp src: Oral BP: 121/49   Heart Rate: 51 Resp: 16 SpO2: 96 %      Neuro: A&O x 4.   Cardiac: SB/SR  50'S- 80'S  Respiratory: Sating 90's on RA. Lung sounds clear, diminished in lower lobes  Diet:  2g NA  /GI: Adequate urine output, active bowel sounds on all quadrants  Activity: Assist of 1; use of cane   Pain: lower back pain, tylenol given for pain pt reported decrease in pain.   Skin: no new deficit noted. Lymphedema wrappers on the lower extremities   LDAs: PIV      P: Continue to monitor Pt status and report changes to treatment team.

## 2019-04-30 NOTE — PLAN OF CARE
Edema 6C: Recommend continued use of compression wraps to manage chronic B LE edema and increase ease with functional mobility. Reapplication of GCB from MTPs to knee creases. Remove wraps if causing pain/numbness or become soiled.

## 2019-04-30 NOTE — PLAN OF CARE
Discharge Planner OT   Patient plan for discharge: Not discussed this session.    Current status: Pt supine inclined in bed upon arrival. Pt required Min A and vc's for transfer supine<->sitting EOB. Pt completed transfer sit<->standing with Min A and vc's with SPC. Pt ambulated ~60ft with CGA, vc's, SPC and WC follow. Pt taken to bathroom with WC. Pt completed transfer to shower chair with Min A and vc's. Pt required Mod A and vc's to complete shower task with setup. Pt returned to room via WC. Pt required Mod A to don/doff LE clothing. Pt fatigued at end of session. VSS.   Barriers to return to prior living situation: Decreased strength and endurance, Decreased independence with functional transfers/ADLs. Fatigue.   Recommendations for discharge: TCU  Rationale for recommendations: Pt will benefit from continued therapy to address barriers above and to maximize functional independence and safety.        Entered by: Addison Angeles 04/30/2019 3:33 PM

## 2019-04-30 NOTE — PLAN OF CARE
Pt refusing participation with PT in AM. Taking a shower in the PM during OT session & needing to have legs re-wrapped with edema. PT will reschedule per POC.

## 2019-04-30 NOTE — PROVIDER NOTIFICATION
"Issue:  Patient reported feeling dizzy and tired, \"I don't feel good.\"    Assessment:  Patient lethargic, responsive to repeated verbal stimulation.  Disoriented to place and time, no facial droop noted.  HR 56 and regular, BP 96/47 with MAP of 63, SaO2 96% on room air.    Notified:  SILVANO Chinchilla with Cards 1, pager [phone]    Action:  Blood sugar checked, found to be 237.  Had weakness in left arm, patient reported she had a chronic rotator cuff injury that has left her weaker on the left side.  VBG ordered, see Results Review.  Neurology consult ordered.  "

## 2019-04-30 NOTE — PLAN OF CARE
D/A/I:  Patient A&O x4, but responses were slightly delayed.  Denied palpitations and dizziness.  Reported nausea that resolved with ginger ale.  Reported TOWNSEND and orthopnea, lung sounds diminished in middle and lower lobes, heart sounds distant.  Had +3 edema in legs and feet, lymphedema wraps in place.  Was given scheduled IV bumetanide; patient had good urine output, see I&O flowsheet.  Rash in right groin moist and red, miconazole cream applied per orders.  In sinus rhythm with first degree AV block and intermittent bradycardia, HR 50s-60s, SBP 100s-120s, SaO2 91-94% on room air.  Potassium replaced per protocol.  Reported low back pain that was managed with prn acetaminophen and heating pad.  Ambulated in room with 1-person assist and use of cane.    P:  Continue to monitor pain, VS, heart rhythm, skin integrity, fluid status, cardiac and respiratory status.  Notify care team of changes in patient condition or other concerns.  Continue diuresis.  Expected to discharge later this week.

## 2019-04-30 NOTE — PROGRESS NOTES
"SPIRITUAL HEALTH SERVICES  SPIRITUAL ASSESSMENT Progress Note  [facility] 6C     Follow-up 6C unit  visit with pt Mariel - pt had been seen previously by on-call  during this admission. Pt very pleasant, welcomed  visit today, talked about:     history of illness and events leading up to this hospitalization (\"I was quite short of breath\")      plan of care (\"I need to lose about 30 pounds of fluid...\")     her beloved canine  Rimma     her strong support at home from her roommate     her jaziel ([employer], member of [employer])   Prayer was welcomed, and Mariel would appreciate receiving sacrament of anointing.    PLAN: Continue to follow; I will visit again this week. Will refer to   for anointing.    Govind Varela) Nita Ramirez M.Div., [facility]  Staff   Pager [phone]      "
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
## 2019-04-30 NOTE — PROGRESS NOTES
"      [facility]   Cardiology Progress      Interval History: Denies fever, chills, nausea, vomiting, CP, SOB, abdominal pain, dysuria, LE pain/swelling.  Legs are wrapped, feels that her thighs are still \"huge.\"  Felt some chest tightness with inspiration today.      Changes today  - give 5mg metolazone today  - Continue Bumex 2 mg IV BID  - BiPAP at night only    Physical Exam:  Temp:  [97.3  F (36.3  C)-97.8  F (36.6  C)] 97.7  F (36.5  C)  Heart Rate:  [51-73] 67  Resp:  [16-18] 18  BP: (107-141)/(35-60) 137/60  FiO2 (%):  [30 %] 30 %  SpO2:  [90 %-98 %] 90 %      Intake/Output Summary (Last 24 hours) at 4/30/2019 0943  Gross per 24 hour   Intake 960 ml   Output 2175 ml   Net -1215 ml     Weight:  Baseline: ~ 300 lbs  Admission: 338 lbs  Today: 325 lbs down only 2 pounds in 24 hours).    GEN: NAD  Pulm: bibasilar inspiratory rales. Otherwise CTAB.  Cardiac: Normal S1 and S2. JVP not examinable given body habitus, no murmurs.   GI: soft, non distended  Neuro:  Alert and oriented x4.    Medications:    aspirin  81 mg Oral Daily     atorvastatin  40 mg Oral Daily     bumetanide  2 mg Intravenous BID     escitalopram  20 mg Oral QAM     ferrous gluconate  324 mg Oral Q Mon Wed Fri AM     folic acid  1 mg Oral Daily     insulin aspart  1-7 Units Subcutaneous TID AC     insulin aspart  1-5 Units Subcutaneous At Bedtime     levothyroxine  150 mcg Oral Daily     losartan  25 mg Oral Daily     losartan  50 mg Oral At Bedtime     metoprolol tartrate  75 mg Oral BID     miconazole   Topical BID     niacin  500 mg Oral Daily     potassium chloride  40 mEq Oral Daily     pregabalin  100 mg Oral BID     senna-docusate  1 tablet Oral BID    Or     senna-docusate  2 tablet Oral BID     sodium chloride (PF)  3 mL Intracatheter Q8H     traZODone  150 mg Oral At Bedtime       - MEDICATION INSTRUCTIONS -         Labs:   [facility]  Recent Labs   Lab 04/30/19  0523 04/29/19  1446 04/29/19  0426 04/28/19  1714 " 04/28/19  0553 04/27/19  1547    139 139 138 139 139   POTASSIUM 3.8 3.7 3.6 3.8 3.7 4.0   CHLORIDE 98 99 100 99 102 101   CO2 30 33* 30 30 29 33*   ANIONGAP 10 7 8 9 8 6   * 165* 145* 196* 157* 132*   BUN 51* 50* 46* 44* 40* 34*   CR 1.64* 1.85* 1.86* 2.01* 1.88* 1.81*   GFRESTIMATED 31* 27* 26* 24* 26* 27*   GFRESTBLACK 36* 31* 31* 28* 30* 32*   ANTOINETTE 9.3 9.1 9.1 9.0 9.1 9.7   MAG  --   --  2.9* 1.5* 1.6 1.5*     CBC  Recent Labs   Lab 04/30/19  0658 04/30/19  0523 04/29/19  0426 04/28/19  0553   WBC 5.4 Unsatisfactory specimen - clotted 5.9 6.8   RBC 3.98 Unsatisfactory specimen - clotted 3.97 4.07   HGB 11.2* Unsatisfactory specimen - clotted 11.2* 11.3*   HCT 36.4 Unsatisfactory specimen - clotted 36.9 37.2   MCV 92 Unsatisfactory specimen - clotted 93 91   MCH 28.1 Unsatisfactory specimen - clotted 28.2 27.8   MCHC 30.8* Unsatisfactory specimen - clotted 30.4* 30.4*   RDW 12.9 Unsatisfactory specimen - clotted 13.2 13.3   * Unsatisfactory specimen - clotted 109* 116*       ASSESSMENT/PLAN:  73 year old with a PMHx of CAD s/p PCI and CABG in 2018, DM2, HLD, CKD Stage III, COPD, and HFpEF. She is admitted with shortness of breath and weight gain. She is found to have pulmonary vascular congestion on a CXR. EDW close to or below 300 lbs, Patient 338 on admission.      HFpEF   Fluid Overload   Shortness of breath   Patient presents with progressively worsening SOB and orthopnea, weight gain of approximately 30 lbs, and edema in her lower extremities/abdomen. CXR remarkable for pulmonary edema. She has had admissions for HFpEF and fluid overload following her CABG in 2018. She has dietary indiscretion and non-compliance using bumex 2mg q48. No other obvious etiology for her shortness of breath. NO EKG changes and trop negative so low suspicion for ACS. No evidence of PNA such as leukocytosis or focal consolidation on CXR. A VQ scan was negative for PE and the progress of her SOB has been insidious  rather than abrupt.  Echo showed EF decrease to 35-40% from 40-45% with dilated IVC. She was given metolazone on admission for increased diuresis.   -consider metolazone tomorrow given episode as below  -continue Bumex 2 mg IV BID; Goal UOP 2-3 L/24 hours   -I/O, daily weights  -Salt restrict, fluid restrict   -BID BMP, lytes replacement   -Lymphedema consult   -PT/OT/CORE     Confusion  Left Sided Weakness  Monoptic Diplopia  Onset noted around 10:30am. Stroke neurology called emergently.  Exam/findings consist with migraine in the setting of previous symptoms with negative imaging.  - continue to monitor  - neuro checks Q-shift  - IP stroke neurology consult placed; appreciate recommendations     CAD status post CABG   HLD   HTN  Patient with history of multiple stents and CABG (LAD, PDA, OM) July of 2018, prolonged hospitalization due to weakness.   -Aspirin 81 mg daily   -Lipitor 40 mg daily   -BB: Metoprolol 75 mg BID   -ACE: 25/50mg losartan daily  -Naicin      Hypercapnia - resolved  The patient had increased somnolence on 4/27 in AM and found to have increased CO2 on VBG to 68. After placement of BiPAP she had improvement in cognition.      DM II   - Medium sliding scale insulin      CKD III   Creat baseline 1.4-1.5. Slight increase of creatinine over the weekend, now improving. Creatinine today 1.64  - BID BMP with diuresis      BELNE   -CPAP at bedtime      Other chronic issues:   COPD - baseline  Mononeuronitis/Osteoarthritis  - continue PTA lyrica  Vitamin D deficiency - hold PTA supplementation  Hypothyroid - continue levothyroxine  Depression - continue lexapro, trazodone       Anticipated Disposition: pending euvolemia    Patient seen and discussed with Dr. Sultana Feliz, who agrees with above plan.    Brittany Fuentes PA-C  Cardiology - [facility]

## 2019-04-30 NOTE — PLAN OF CARE
D/A/I:  Patient A&O x4, denied palpitations, dizziness, and nausea.  Reported TOWNSEND, lung sounds diminished in middle and lower lung lobes with fine crackles in the bases.  Heart sounds distant.  Had +3 edema in legs and feet, lymphedema wraps replaced.  Gave scheduled IV bumetanide; patient had good urine output, see I&O flowsheet.  Patient reported no BM since 4/28, bowel sounds hypoactive, reported passing gas.  Was given scheduled and prn bowel medications; patient had a BM during shift, see I&O flowsheet.  Wound found in right groin fold, WOC consult placed.  In sinus rhythm with occasional bradycardia, HR 50s-70s, SBP 90s-170s, SaO2 92-96% on room air.  Potassium replaced per protocol.  Reported low back pain that was managed with heating pad and prn acetaminophen.  Had an episode of lethargy during shift, see Provider Notify note for details.  Ambulated in room and hallway with 1-person assist and use of cane.  Roommate visited during shift.    P:  Continue to monitor pain, VS, heart rhythm, skin integrity, fluid status, bowel status, cardiac and respiratory status.  Notify care team of changes in patient condition or other concerns.  Continue diuresis.  Potential discharge later this week.

## 2019-05-01 ENCOUNTER — APPOINTMENT (OUTPATIENT)
Dept: OCCUPATIONAL THERAPY | Facility: CLINIC | Age: 74
DRG: 291 | End: 2019-05-01
Payer: MEDICARE

## 2019-05-01 ENCOUNTER — APPOINTMENT (OUTPATIENT)
Dept: PHYSICAL THERAPY | Facility: CLINIC | Age: 74
DRG: 291 | End: 2019-05-01
Payer: MEDICARE

## 2019-05-01 LAB
ANION GAP SERPL CALCULATED.3IONS-SCNC: 7 MMOL/L (ref 3–14)
ANION GAP SERPL CALCULATED.3IONS-SCNC: 8 MMOL/L (ref 3–14)
BACTERIA SPEC CULT: NORMAL
BUN SERPL-MCNC: 52 MG/DL (ref 7–30)
BUN SERPL-MCNC: 53 MG/DL (ref 7–30)
CALCIUM SERPL-MCNC: 9.3 MG/DL (ref 8.5–10.1)
CALCIUM SERPL-MCNC: 9.5 MG/DL (ref 8.5–10.1)
CHLORIDE SERPL-SCNC: 99 MMOL/L (ref 94–109)
CHLORIDE SERPL-SCNC: 99 MMOL/L (ref 94–109)
CO2 SERPL-SCNC: 32 MMOL/L (ref 20–32)
CO2 SERPL-SCNC: 33 MMOL/L (ref 20–32)
CREAT SERPL-MCNC: 1.67 MG/DL (ref 0.52–1.04)
CREAT SERPL-MCNC: 1.77 MG/DL (ref 0.52–1.04)
ERYTHROCYTE [DISTWIDTH] IN BLOOD BY AUTOMATED COUNT: 13 % (ref 10–15)
GFR SERPL CREATININE-BSD FRML MDRD: 28 ML/MIN/{1.73_M2}
GFR SERPL CREATININE-BSD FRML MDRD: 30 ML/MIN/{1.73_M2}
GLUCOSE BLDC GLUCOMTR-MCNC: 133 MG/DL (ref 70–99)
GLUCOSE BLDC GLUCOMTR-MCNC: 138 MG/DL (ref 70–99)
GLUCOSE BLDC GLUCOMTR-MCNC: 156 MG/DL (ref 70–99)
GLUCOSE BLDC GLUCOMTR-MCNC: 166 MG/DL (ref 70–99)
GLUCOSE BLDC GLUCOMTR-MCNC: 232 MG/DL (ref 70–99)
GLUCOSE SERPL-MCNC: 144 MG/DL (ref 70–99)
GLUCOSE SERPL-MCNC: 152 MG/DL (ref 70–99)
HCT VFR BLD AUTO: 36.7 % (ref 35–47)
HGB BLD-MCNC: 11.5 G/DL (ref 11.7–15.7)
Lab: NORMAL
MAGNESIUM SERPL-MCNC: 1.9 MG/DL (ref 1.6–2.3)
MCH RBC QN AUTO: 28 PG (ref 26.5–33)
MCHC RBC AUTO-ENTMCNC: 31.3 G/DL (ref 31.5–36.5)
MCV RBC AUTO: 90 FL (ref 78–100)
PLATELET # BLD AUTO: 114 10E9/L (ref 150–450)
POTASSIUM SERPL-SCNC: 3.4 MMOL/L (ref 3.4–5.3)
POTASSIUM SERPL-SCNC: 3.8 MMOL/L (ref 3.4–5.3)
RBC # BLD AUTO: 4.1 10E12/L (ref 3.8–5.2)
SODIUM SERPL-SCNC: 139 MMOL/L (ref 133–144)
SODIUM SERPL-SCNC: 139 MMOL/L (ref 133–144)
SPECIMEN SOURCE: NORMAL
WBC # BLD AUTO: 5.5 10E9/L (ref 4–11)

## 2019-05-01 PROCEDURE — 25000132 ZZH RX MED GY IP 250 OP 250 PS 637: Performed by: PHYSICIAN ASSISTANT

## 2019-05-01 PROCEDURE — 85027 COMPLETE CBC AUTOMATED: CPT | Performed by: PHYSICIAN ASSISTANT

## 2019-05-01 PROCEDURE — G0463 HOSPITAL OUTPT CLINIC VISIT: HCPCS

## 2019-05-01 PROCEDURE — 25000132 ZZH RX MED GY IP 250 OP 250 PS 637: Performed by: STUDENT IN AN ORGANIZED HEALTH CARE EDUCATION/TRAINING PROGRAM

## 2019-05-01 PROCEDURE — A9270 NON-COVERED ITEM OR SERVICE: HCPCS | Performed by: INTERNAL MEDICINE

## 2019-05-01 PROCEDURE — 25000132 ZZH RX MED GY IP 250 OP 250 PS 637: Performed by: INTERNAL MEDICINE

## 2019-05-01 PROCEDURE — 97140 MANUAL THERAPY 1/> REGIONS: CPT | Mod: GO | Performed by: OCCUPATIONAL THERAPIST

## 2019-05-01 PROCEDURE — 97116 GAIT TRAINING THERAPY: CPT | Mod: GP

## 2019-05-01 PROCEDURE — 94660 CPAP INITIATION&MGMT: CPT

## 2019-05-01 PROCEDURE — 97535 SELF CARE MNGMENT TRAINING: CPT | Mod: GO

## 2019-05-01 PROCEDURE — A9270 NON-COVERED ITEM OR SERVICE: HCPCS | Performed by: STUDENT IN AN ORGANIZED HEALTH CARE EDUCATION/TRAINING PROGRAM

## 2019-05-01 PROCEDURE — 97530 THERAPEUTIC ACTIVITIES: CPT | Mod: GP

## 2019-05-01 PROCEDURE — 36415 COLL VENOUS BLD VENIPUNCTURE: CPT | Performed by: PHYSICIAN ASSISTANT

## 2019-05-01 PROCEDURE — A9270 NON-COVERED ITEM OR SERVICE: HCPCS | Performed by: PHYSICIAN ASSISTANT

## 2019-05-01 PROCEDURE — 21400000 ZZH R&B CCU UMMC

## 2019-05-01 PROCEDURE — 97110 THERAPEUTIC EXERCISES: CPT | Mod: GP

## 2019-05-01 PROCEDURE — 00000146 ZZHCL STATISTIC GLUCOSE BY METER IP

## 2019-05-01 PROCEDURE — 40000275 ZZH STATISTIC RCP TIME EA 10 MIN

## 2019-05-01 PROCEDURE — 25000128 H RX IP 250 OP 636: Performed by: PHYSICIAN ASSISTANT

## 2019-05-01 PROCEDURE — 99232 SBSQ HOSP IP/OBS MODERATE 35: CPT | Performed by: PHYSICIAN ASSISTANT

## 2019-05-01 PROCEDURE — 83735 ASSAY OF MAGNESIUM: CPT | Performed by: PHYSICIAN ASSISTANT

## 2019-05-01 PROCEDURE — 80048 BASIC METABOLIC PNL TOTAL CA: CPT | Performed by: PHYSICIAN ASSISTANT

## 2019-05-01 RX ORDER — METOPROLOL TARTRATE 50 MG
50 TABLET ORAL 2 TIMES DAILY
Status: DISCONTINUED | OUTPATIENT
Start: 2019-05-01 | End: 2019-05-01

## 2019-05-01 RX ORDER — METOPROLOL TARTRATE 50 MG
50 TABLET ORAL 2 TIMES DAILY
Status: DISCONTINUED | OUTPATIENT
Start: 2019-05-01 | End: 2019-05-03 | Stop reason: HOSPADM

## 2019-05-01 RX ORDER — METOLAZONE 5 MG/1
5 TABLET ORAL ONCE
Status: COMPLETED | OUTPATIENT
Start: 2019-05-01 | End: 2019-05-01

## 2019-05-01 RX ADMIN — LEVOTHYROXINE SODIUM 150 MCG: 150 TABLET ORAL at 09:08

## 2019-05-01 RX ADMIN — FERROUS GLUCONATE 324 MG: 324 TABLET ORAL at 09:08

## 2019-05-01 RX ADMIN — LOSARTAN POTASSIUM 25 MG: 25 TABLET, FILM COATED ORAL at 09:08

## 2019-05-01 RX ADMIN — GUAIFENESIN 600 MG: 600 TABLET, EXTENDED RELEASE ORAL at 21:04

## 2019-05-01 RX ADMIN — LIDOCAINE 1 PATCH: 560 PATCH PERCUTANEOUS; TOPICAL; TRANSDERMAL at 21:05

## 2019-05-01 RX ADMIN — ACETAMINOPHEN 975 MG: 325 TABLET, FILM COATED ORAL at 09:21

## 2019-05-01 RX ADMIN — FOLIC ACID 1 MG: 1 TABLET ORAL at 09:08

## 2019-05-01 RX ADMIN — POTASSIUM CHLORIDE 40 MEQ: 750 TABLET, EXTENDED RELEASE ORAL at 09:08

## 2019-05-01 RX ADMIN — ACETAMINOPHEN 975 MG: 325 TABLET, FILM COATED ORAL at 21:04

## 2019-05-01 RX ADMIN — MICONAZOLE NITRATE: 20 CREAM TOPICAL at 09:20

## 2019-05-01 RX ADMIN — NIACIN ER 500 MG TABLET,EXTENDED RELEASE 500 MG: at 11:02

## 2019-05-01 RX ADMIN — POTASSIUM CHLORIDE 20 MEQ: 750 TABLET, EXTENDED RELEASE ORAL at 12:18

## 2019-05-01 RX ADMIN — BUMETANIDE 2 MG: 0.25 INJECTION INTRAMUSCULAR; INTRAVENOUS at 09:08

## 2019-05-01 RX ADMIN — ESCITALOPRAM 20 MG: 20 TABLET, FILM COATED ORAL at 09:07

## 2019-05-01 RX ADMIN — METOLAZONE 5 MG: 5 TABLET ORAL at 11:02

## 2019-05-01 RX ADMIN — PREGABALIN 100 MG: 100 CAPSULE ORAL at 09:07

## 2019-05-01 RX ADMIN — METOPROLOL TARTRATE 50 MG: 50 TABLET ORAL at 11:03

## 2019-05-01 RX ADMIN — PREGABALIN 100 MG: 100 CAPSULE ORAL at 21:04

## 2019-05-01 RX ADMIN — POTASSIUM CHLORIDE 20 MEQ: 750 TABLET, EXTENDED RELEASE ORAL at 17:16

## 2019-05-01 RX ADMIN — TRAZODONE HYDROCHLORIDE 150 MG: 100 TABLET ORAL at 21:26

## 2019-05-01 RX ADMIN — GUAIFENESIN 600 MG: 600 TABLET, EXTENDED RELEASE ORAL at 09:07

## 2019-05-01 RX ADMIN — ATORVASTATIN CALCIUM 40 MG: 40 TABLET, FILM COATED ORAL at 21:04

## 2019-05-01 RX ADMIN — LOSARTAN POTASSIUM 50 MG: 50 TABLET ORAL at 21:26

## 2019-05-01 RX ADMIN — METOPROLOL TARTRATE 50 MG: 50 TABLET ORAL at 21:04

## 2019-05-01 RX ADMIN — ASPIRIN 81 MG CHEWABLE TABLET 81 MG: 81 TABLET CHEWABLE at 09:07

## 2019-05-01 RX ADMIN — BUMETANIDE 2 MG: 0.25 INJECTION INTRAMUSCULAR; INTRAVENOUS at 17:16

## 2019-05-01 ASSESSMENT — ACTIVITIES OF DAILY LIVING (ADL)
ADLS_ACUITY_SCORE: 18

## 2019-05-01 ASSESSMENT — PAIN DESCRIPTION - DESCRIPTORS
DESCRIPTORS: SORE
DESCRIPTORS: SORE

## 2019-05-01 ASSESSMENT — MIFFLIN-ST. JEOR: SCORE: 1985.29

## 2019-05-01 NOTE — PLAN OF CARE
PT - 6C  Discharge Planner PT   Patient plan for discharge: Pt reports a preference to go home, however, was open to TCU discussion this day.  Current status: Sit <> stand SBA to 4WW from varying heights this day, cueing for safety with 4WW. Ambulated ~150ft +~50ft + ~25ft +~175ft with 4WW, SBA. Stair navigation performed with bethany railings, CGA and step ups for LE strengthening. Engaged in Nustep x10 minutes at workload of 1-2. Pt demonstrating decreased safety awareness this day, attempting x2 to wheel in 4WW vs walking despite cueing and education.    Barriers to return to prior living situation: Medical needs, endurance, strength, balance, level of assist, safety insight, alone during the day.  Recommendations for discharge: TCU.  Rationale for recommendations: Pt would benefit from continued skilled therapy to address deficits related to functional mobility. Pt alone at home most of day and based on overall mobility, endurance and safety deficits this day further skilled therapy is indicated prior to discharge home. If patient unwilling to agree to rehab stay post discharge strongly recommend home therapies and initial 24 hour assist with discharge home.    Entered by: Sarah Wagner 05/01/2019 3:15 PM

## 2019-05-01 NOTE — PLAN OF CARE
"D: HF exacerbation and pulmonary edema  Hx: CAD s/p CABG, T2DM, HLD, CKD, COPD, L rotator cuff repair.    I: Monitored vitals and assessed pt status.   Changed:  -Pt bradycardic overnight and early this AM when sleeping. Cards I notified. Metoprolol decreased to 50 mg BID (from 75mg)  -metolazone tab given x1    -no acute neurological changes    -WOC consulted for R groin/abdominal fold wound/rash:  Right abdominal fold wound: Daily  and PRN   Cleanse the area and dry thoroughly.  Cut a piece of interdry (#[id number]) and place it as a single layer and date it.   Ensure to leave at least 2\" of tail beyond the fold to promote moisture wicking.  May leave same interdry for 5 days if not soiled.  DO not apply in conjunction with cream or powder  Area cleansed with soap and water, patted dry. Interdry in place with 2\" showing. Miconazole cream discontinued.    -L 1st toe has dressing (primapore) on d/t ingrown toe nail. CDI. No drainage. Dressing to prevent rubbing/irritation from other toe.    Running: LPIV SL    PRN: tylenol 975 mg x1; aqua K pad; K replaced x2 (3.4 AM; 3.8 PM)    A: A0x4. VSS on RA. CPAP at NOC. SR while awake; SB when sleeping. Afebrile. Urinating adequately.   Assist of 1 with cane and gaitbelt  Vital signs:  Temp: 97  F (36.1  C) Temp src: Oral BP: 119/56   Heart Rate: 57 Resp: 16 SpO2: 95 % O2 Device: None (Room air) Oxygen Delivery: 2 LPM Height: 168.9 cm (5' 6.5\") Weight: 145.1 kg (319 lb 12.8 oz)  Estimated body mass index is 50.84 kg/m  as calculated from the following:    Height as of this encounter: 1.689 m (5' 6.5\").    Weight as of this encounter: 145.1 kg (319 lb 12.8 oz).      P: Continue to monitor Pt status and report changes to Cards I team. TCU vs home at discharge  "

## 2019-05-01 NOTE — PLAN OF CARE
Pt appeared to sleep soundly between cares overnight. Denies pain. Tele SR SB. VSS. LS with faint crackles in bases. Wore Bipap overnight. Has red groin rash. Pt ambulated to  with SBA. On return from BR pt's legs became weak and nearly buckled under her. Pt sat down in chair in bathroom and rested and was able to walk back to bed. LE wraps on. Pt's K is 3.4 this am. Will get 20 meq K replacement.  Makes needs known. Will continue to monitor pt.

## 2019-05-01 NOTE — PLAN OF CARE
Discharge Planner OT  [facility] [place]  Patient plan for discharge: Home with assist from roommate  Current status: Facilitated increased IND and activity tolerance with ADLs. Pt able to sit < > stand x3 using SPC with SBA-CGA; however, pt had LOB x1 once standing from EOB. Pt progressed IND with TB dressing by donning shirt, pants, underwear, and shoes while seated EOB using a reacher with set-up and SBA.   Barriers to return to prior living situation: medical status, activity tolerance and weakness, home alone during the day  Recommendations for discharge: Per plan established by the OT, the recommendation for dc location is TCU.  Rationale for recommendations: Pt will benefit from continued therapy to increase activity tolerance and IND with ADLs to maximize safe, functional IND.       Entered by: Evette Tello 05/01/2019 10:33 AM

## 2019-05-01 NOTE — PROGRESS NOTES
"WO Nurse Inpatient Wound Assessment   Reason for consultation: Evaluate and treat Right abdominal fold wound     Assessment  Right abdominal fold wound due to Intertrigo with fungal infection  Status: initial assessment    Treatment Plan  Right abdominal fold wound: Daily  and PRN   Cleanse the area and dry thoroughly.  Cut a piece of interdry (#[id number]) and place it as a single layer and date it.   Ensure to leave at least 2\" of tail beyond the fold to promote moisture wicking.  May leave same interdry for 5 days if not soiled.  DO not apply in conjunction with cream or powder    Orders Written  Recommended provider order: None  WO Nurse follow-up plan:weekly  Nursing to notify the Provider(s) and re-consult the WO Nurse if wound(s) deteriorates or new skin concern.    Patient History  According to provider note(s):  Ms. Ribeiro is a 73 y.o. Woman with PMHx CAD s/p PCI and CABG in 2018, DM2, HLD, CKD stage II, COPD and HFpEF who was admitted for HFpEF exacerbation. Stroke neurology was consulted with concern for encephalopathy, left sided weakness and diplopia, which was appreciated by team ~ 11 AM. Ms. Ribeiro denies acute neurologic changes. She indicates to me that she is developing a left sided migraine with photophobia and left sided weakness, which is similar to previous migraines. She was seen by neurology in May 2015 with similar symptoms of migraine headaches with photophobia and weakness in left arm and leg. At the time, she had 2-3 spells over a few month period. She endorses subtle sensory changes with ~ 80-90% of normal in LUE and these are also not new. She has chronic peripheral neuropathy in feet > hands. In terms of her vision changes, she has chronic monocular bilateral diplopia with difficult focusing and has seen ophthalmology for these symptoms since 2016. She also endorses back pain, abdominal pain and chronic left shoulder pain.         Objective Data  Containment of urine/stool: Continent " of bowel and Continent of bladder    Active Diet Order  Orders Placed This Encounter      Combination Diet 2 gm NA Diet; No Caffeine Diet; No Caffeine for 24 hours (once tests completed, may have caffeine)      Output:   I/O last 3 completed shifts:  In: 1220 [P.O.:1220]  Out: 3050 [Urine:3050]    Risk Assessment:   Sensory Perception: 4-->no impairment  Moisture: 4-->rarely moist  Activity: 3-->walks occasionally  Mobility: 3-->slightly limited  Nutrition: 3-->adequate  Friction and Shear: 2-->potential problem  Willie Score: 19                          Labs:   Recent Labs   Lab 05/01/19  0444 04/30/19  0658   HGB 11.5* 11.2*   WBC 5.5 5.4   A1C  --  6.5*       Physical Exam  Skin inspection: focused Abdominal folds    Wound Location:  Right side of abdominal fold        Date of last photo 5/1/19  Wound History: Large abdominal folds and perspire/very moist.  Measurements (length x width x depth, in cm) Entire right side is red with small satellite lesions- open area measured at 0.3 cm x 0.5 cm  x  0.1 cm   Wound Base: 100 % dermis  Tunneling N/A  Undermining N/A  Palpation of the wound bed: normal   Periwound skin: intact, rash and red, erythematous,  small satellite lesions  Color: red  Temperature: normal   Drainage:, small  Description of drainage: serous  Odor: mild  Pain: during dressing change, tender with cleansing    Interventions  Current support surface: Standard  Atmos Air mattress  Current off-loading measures: pt able to shift weight from side to side  Visual inspection of wound(s) completed  Wound Care: completed by RN  Supplies: ordered: Interdry AG  Education provided: plan of care and wound progress and how to use interdry correctly  Discussed plan of care with Patient and Nurse    Kate Curiel RN

## 2019-05-01 NOTE — PLAN OF CARE
D: Pt admitted 4/25 with HF exacerbation, pulmonary edema. Hx CAD s/p CABG, T2DM, HLD, CKD, COPD, L rotator cuff repair.  I: Monitored vitals and assessed pt status. PRN tylenol. GT=502 at HS, corrected per order.  A: A0x4. VSS on RA/BiPAP when asleep. SB/SR on tele. L shoulder and low back pain addressed with lidocaine patch (shoulder), heat pad (low back), and PRN med with improvement reported. Voiding adequately. Wound in R groin continues to drain scant, sanguious drainage, dry gauze changed. Up SBA with cane. Pt slept between cares, making needs known.  P: Continue to monitor and report changes/concerns to Cards 1.

## 2019-05-01 NOTE — PLAN OF CARE
Edema 6C: Patient with chronic B LE edema, skin intact with discoloration on anterior R shin. Reapplication of GCB from MTPs to knee creases for further edema management and increased ease with functional mobility. Remove wraps if causing pain/numbness or become soiled.

## 2019-05-01 NOTE — PROGRESS NOTES
Social Work Services Progress Note    Hospital Day: 6  Date of Initial Social Work Evaluation:  4/29/19  Collaborated with:  6D MARCO, RNCC, [facility] TCU admissions    Data:  Pt is a 73 year-old woman admitted to 6C    Intervention:  SW received notification late in the afternoon that pt has now agreed to TCU after declining previously. Team requested follow up ASAP as pt likely ready tomorrow. 6D SW left vm for admissions at [facility]. This SW re-faxed referral for their consideration and will follow up tomorrow a.m.    Assessment:  Did not meet with pt today.    Plan:    Anticipated Disposition:  TBD    Barriers to d/c plan:  Medical readiness, bed availability    Follow Up:  SW will continue to remain available for discharge planning, other resources and support PRN.    NAKITA Watkins, [facility]   Pager: [phone]

## 2019-05-01 NOTE — PROGRESS NOTES
"      [facility]   Cardiology Progress      Interval History: Reports no further complaints.  Feels that she has \"recoverd\" from her episode yesterday morning and states that these were her \"alters\" fighting against one another.  Denies new SOB, but states that her breathing is still not \"back to baseline.\"    Physical Exam:  Temp:  [97.7  F (36.5  C)-98.3  F (36.8  C)] 98.2  F (36.8  C)  Heart Rate:  [53-63] 57  Resp:  [16-18] 16  BP: ()/(46-71) 147/57  FiO2 (%):  [30 %] 30 %  SpO2:  [93 %-96 %] 93 %      Intake/Output Summary (Last 24 hours) at 5/1/2019 0910  Last data filed at 5/1/2019 0355  Gross per 24 hour   Intake 740 ml   Output 2750 ml   Net -2010 ml       Weight:   Today: 319  Admission: 338    GEN: NAD  Pulm: faint bibasilar crakles  Cardiac: Normal S1 and S2. JVP appreciably elevated, no murmurs.  LE wraps in place for lymphedema  GI: soft, non distended  Neuro: Alert and oriented x4.    Medications:    aspirin  81 mg Oral Daily     atorvastatin  40 mg Oral Daily     bumetanide  2 mg Intravenous BID     escitalopram  20 mg Oral QAM     ferrous gluconate  324 mg Oral Q Mon Wed Fri AM     folic acid  1 mg Oral Daily     guaiFENesin  600 mg Oral BID     insulin aspart  1-7 Units Subcutaneous TID AC     insulin aspart  1-5 Units Subcutaneous At Bedtime     levothyroxine  150 mcg Oral Daily     lidocaine  1 patch Transdermal Q24h    And     lidocaine   Transdermal Q24H    And     lidocaine   Transdermal Q8H     losartan  25 mg Oral Daily     losartan  50 mg Oral At Bedtime     metoprolol tartrate  50 mg Oral BID     miconazole   Topical BID     niacin  500 mg Oral Daily     potassium chloride  40 mEq Oral Daily     pregabalin  100 mg Oral BID     senna-docusate  1 tablet Oral BID    Or     senna-docusate  2 tablet Oral BID     sodium chloride (PF)  3 mL Intracatheter Q8H     traZODone  150 mg Oral At Bedtime       - MEDICATION INSTRUCTIONS -         Labs:   CMP  Recent Labs "   Lab 05/01/19  0444 04/30/19  1616 04/30/19  0523 04/29/19  1446 04/29/19  0426 04/28/19  1714 04/28/19  0553    140 139 139 139 138 139   POTASSIUM 3.4 3.8 3.8 3.7 3.6 3.8 3.7   CHLORIDE 99 99 98 99 100 99 102   CO2 32 34* 30 33* 30 30 29   ANIONGAP 8 8 10 7 8 9 8   * 139* 149* 165* 145* 196* 157*   BUN 52* 52* 51* 50* 46* 44* 40*   CR 1.67* 1.79* 1.64* 1.85* 1.86* 2.01* 1.88*   GFRESTIMATED 30* 28* 31* 27* 26* 24* 26*   GFRESTBLACK 35* 32* 36* 31* 31* 28* 30*   ANTOINETTE 9.3 9.8 9.3 9.1 9.1 9.0 9.1   MAG 1.9  --   --   --  2.9* 1.5* 1.6     CBC  Recent Labs   Lab 05/01/19  0444 04/30/19  0658 04/30/19  0523 04/29/19  0426   WBC 5.5 5.4 Unsatisfactory specimen - clotted 5.9   RBC 4.10 3.98 Unsatisfactory specimen - clotted 3.97   HGB 11.5* 11.2* Unsatisfactory specimen - clotted 11.2*   HCT 36.7 36.4 Unsatisfactory specimen - clotted 36.9   MCV 90 92 Unsatisfactory specimen - clotted 93   MCH 28.0 28.1 Unsatisfactory specimen - clotted 28.2   MCHC 31.3* 30.8* Unsatisfactory specimen - clotted 30.4*   RDW 13.0 12.9 Unsatisfactory specimen - clotted 13.2   * 105* Unsatisfactory specimen - clotted 109*     INRNo lab results found in last 7 days.  Arterial Blood Gas  Recent Labs   Lab 04/30/19  1210 04/28/19  0142 04/27/19  2351 04/27/19  0924   O2PER 21.0 21.0 30.0 2L       ASSESSMENT/PLAN:  73 year old with a PMHx of CAD s/p PCI and CABG in 2018, DM2, HLD, CKD Stage III, COPD, and HFpEF. She is admitted with shortness of breath and weight gain. She is found to have pulmonary vascular congestion on a CXR. EDW close to or below 300 lbs, Patient 338 on admission.      HFpEF   Fluid Overload   Shortness of breath   Patient presents with progressively worsening SOB and orthopnea, weight gain of approximately 30 lbs, and edema in her lower extremities/abdomen. CXR remarkable for pulmonary edema. She has had admissions for HFpEF and fluid overload following her CABG in 2018. She has dietary indiscretion and  non-compliance using bumex 2mg q48. No other obvious etiology for her shortness of breath. NO EKG changes and trop negative so low suspicion for ACS. No evidence of PNA such as leukocytosis or focal consolidation on CXR. A VQ scan was negative for PE and the progress of her SOB has been insidious rather than abrupt.  Echo showed EF decrease to 35-40% from 40-45% with dilated IVC. She was given metolazone on admission for increased diuresis. She is net negative 9L.  -give 5mg metolazone today  -continue Bumex 2 mg IV BID; Goal UOP 2-3 L/24 hours   -I/O, daily weights  -Salt restrict, fluid restrict   -BID BMP, lytes replacement   -Lymphedema consult   -PT/OT/CORE      Confusion  Left Sided Weakness  Monoptic Diplopia  Onset noted around 10:30am. Stroke neurology called emergently.  Exam/findings consist with migraine in the setting of previous symptoms with negative imaging.  - continue to monitor  - neuro checks Q-shift  - IP stroke neurology consult placed; appreciate recommendations     CAD status post CABG   HLD   HTN  Patient with history of multiple stents and CABG (LAD, PDA, OM) July of 2018, prolonged hospitalization due to weakness.   -Aspirin 81 mg daily   -Lipitor 40 mg daily   -BB: Metoprolol 50 mg BID - will reduce dose given bradycardia  -ACE: 25/50mg losartan daily  -Naicin      Hypercapnia - resolved  The patient had increased somnolence on 4/27 in AM and found to have increased CO2 on VBG to 68. After placement of BiPAP she had improvement in cognition.      DM II   - Medium sliding scale insulin      CKD III   Creat baseline 1.4-1.5. Slight increase of creatinine over the weekend, now improving. Creatinine today 1.64  - BID BMP with diuresis      BELEN   -CPAP at bedtime      Other chronic issues:   COPD - baseline  Mononeuronitis/Osteoarthritis  - continue PTA lyrica  Vitamin D deficiency - hold PTA supplementation  Hypothyroid - continue levothyroxine  Depression - continue lexapro,  trazodone        Anticipated Disposition: pending diuresis    Patient seen and discussed with Dr. Sultana Feliz, who agrees with above plan.    Brittany Fuentes PA-C  Cardiology - [facility]

## 2019-05-01 NOTE — PROGRESS NOTES
SPIRITUAL HEALTH SERVICES  SPIRITUAL ASSESSMENT Progress Note  [facility] ([place]) 6C     REFERRAL SOURCE: Unit     Unable to visit--pt sleeping.    PLAN: Will try again today.    Johnie Estevez M.Div.     Pager [phone]

## 2019-05-01 NOTE — SIGNIFICANT EVENT
SPIRITUAL HEALTH SERVICES  SPIRITUAL ASSESSMENT Progress Note  [facility] ([place]) 6C     REFERRAL SOURCE: Unit     Pt was sitting in chair. Pt communicated to me:    Yesterday she had a group of medical staff visit her. She struggles when a group of people talk with her. She prefers one or two people to talk with her at a time. Just then another group of medical staff came to visit her. When I told them that the pt does not like groups visiting her they understood and just two of them communicated with her--I communicated the pt's desire to nursing staff.    We prayed together and I gave her Communion.    PATIENT ANOINTED by Father Johnie Estevez 5/1/2019     PLAN: Spiritual Health Services remains available for patient's ongoing support.      Johnie Estevez M.Div.     Pager [phone]

## 2019-05-02 ENCOUNTER — APPOINTMENT (OUTPATIENT)
Dept: OCCUPATIONAL THERAPY | Facility: CLINIC | Age: 74
DRG: 291 | End: 2019-05-02
Payer: MEDICARE

## 2019-05-02 ENCOUNTER — DOCUMENTATION ONLY (OUTPATIENT)
Dept: CARE COORDINATION | Facility: CLINIC | Age: 74
End: 2019-05-02

## 2019-05-02 LAB
ANION GAP SERPL CALCULATED.3IONS-SCNC: 10 MMOL/L (ref 3–14)
ANION GAP SERPL CALCULATED.3IONS-SCNC: 9 MMOL/L (ref 3–14)
BUN SERPL-MCNC: 62 MG/DL (ref 7–30)
BUN SERPL-MCNC: 66 MG/DL (ref 7–30)
CALCIUM SERPL-MCNC: 9.5 MG/DL (ref 8.5–10.1)
CALCIUM SERPL-MCNC: 9.8 MG/DL (ref 8.5–10.1)
CHLORIDE SERPL-SCNC: 98 MMOL/L (ref 94–109)
CHLORIDE SERPL-SCNC: 98 MMOL/L (ref 94–109)
CO2 SERPL-SCNC: 32 MMOL/L (ref 20–32)
CO2 SERPL-SCNC: 32 MMOL/L (ref 20–32)
CREAT SERPL-MCNC: 1.83 MG/DL (ref 0.52–1.04)
CREAT SERPL-MCNC: 1.88 MG/DL (ref 0.52–1.04)
ERYTHROCYTE [DISTWIDTH] IN BLOOD BY AUTOMATED COUNT: 12.9 % (ref 10–15)
GFR SERPL CREATININE-BSD FRML MDRD: 26 ML/MIN/{1.73_M2}
GFR SERPL CREATININE-BSD FRML MDRD: 27 ML/MIN/{1.73_M2}
GLUCOSE BLDC GLUCOMTR-MCNC: 165 MG/DL (ref 70–99)
GLUCOSE BLDC GLUCOMTR-MCNC: 172 MG/DL (ref 70–99)
GLUCOSE BLDC GLUCOMTR-MCNC: 194 MG/DL (ref 70–99)
GLUCOSE BLDC GLUCOMTR-MCNC: 201 MG/DL (ref 70–99)
GLUCOSE BLDC GLUCOMTR-MCNC: 238 MG/DL (ref 70–99)
GLUCOSE SERPL-MCNC: 178 MG/DL (ref 70–99)
GLUCOSE SERPL-MCNC: 192 MG/DL (ref 70–99)
HCT VFR BLD AUTO: 36.8 % (ref 35–47)
HGB BLD-MCNC: 11.5 G/DL (ref 11.7–15.7)
MCH RBC QN AUTO: 28.3 PG (ref 26.5–33)
MCHC RBC AUTO-ENTMCNC: 31.3 G/DL (ref 31.5–36.5)
MCV RBC AUTO: 90 FL (ref 78–100)
PLATELET # BLD AUTO: 113 10E9/L (ref 150–450)
POTASSIUM SERPL-SCNC: 3.2 MMOL/L (ref 3.4–5.3)
POTASSIUM SERPL-SCNC: 3.9 MMOL/L (ref 3.4–5.3)
RBC # BLD AUTO: 4.07 10E12/L (ref 3.8–5.2)
SODIUM SERPL-SCNC: 138 MMOL/L (ref 133–144)
SODIUM SERPL-SCNC: 139 MMOL/L (ref 133–144)
WBC # BLD AUTO: 6.2 10E9/L (ref 4–11)

## 2019-05-02 PROCEDURE — 80048 BASIC METABOLIC PNL TOTAL CA: CPT | Performed by: PHYSICIAN ASSISTANT

## 2019-05-02 PROCEDURE — 40000275 ZZH STATISTIC RCP TIME EA 10 MIN

## 2019-05-02 PROCEDURE — 99232 SBSQ HOSP IP/OBS MODERATE 35: CPT | Performed by: PHYSICIAN ASSISTANT

## 2019-05-02 PROCEDURE — 25000132 ZZH RX MED GY IP 250 OP 250 PS 637: Performed by: INTERNAL MEDICINE

## 2019-05-02 PROCEDURE — 21400000 ZZH R&B CCU UMMC

## 2019-05-02 PROCEDURE — A9270 NON-COVERED ITEM OR SERVICE: HCPCS | Performed by: PHYSICIAN ASSISTANT

## 2019-05-02 PROCEDURE — 00000146 ZZHCL STATISTIC GLUCOSE BY METER IP

## 2019-05-02 PROCEDURE — A9270 NON-COVERED ITEM OR SERVICE: HCPCS | Performed by: STUDENT IN AN ORGANIZED HEALTH CARE EDUCATION/TRAINING PROGRAM

## 2019-05-02 PROCEDURE — 25000132 ZZH RX MED GY IP 250 OP 250 PS 637: Performed by: PHYSICIAN ASSISTANT

## 2019-05-02 PROCEDURE — 97535 SELF CARE MNGMENT TRAINING: CPT | Mod: GO | Performed by: OCCUPATIONAL THERAPIST

## 2019-05-02 PROCEDURE — 36415 COLL VENOUS BLD VENIPUNCTURE: CPT | Performed by: PHYSICIAN ASSISTANT

## 2019-05-02 PROCEDURE — 25000132 ZZH RX MED GY IP 250 OP 250 PS 637: Performed by: STUDENT IN AN ORGANIZED HEALTH CARE EDUCATION/TRAINING PROGRAM

## 2019-05-02 PROCEDURE — 85027 COMPLETE CBC AUTOMATED: CPT | Performed by: PHYSICIAN ASSISTANT

## 2019-05-02 PROCEDURE — A9270 NON-COVERED ITEM OR SERVICE: HCPCS | Performed by: INTERNAL MEDICINE

## 2019-05-02 PROCEDURE — 94660 CPAP INITIATION&MGMT: CPT

## 2019-05-02 RX ORDER — BUMETANIDE 2 MG/1
2 TABLET ORAL DAILY
Status: DISCONTINUED | OUTPATIENT
Start: 2019-05-02 | End: 2019-05-03 | Stop reason: HOSPADM

## 2019-05-02 RX ORDER — NITROGLYCERIN 0.4 MG/1
TABLET SUBLINGUAL
Qty: 25 TABLET | Refills: 0 | DISCHARGE
Start: 2019-05-02 | End: 2019-05-03

## 2019-05-02 RX ORDER — METOPROLOL TARTRATE 50 MG
50 TABLET ORAL 2 TIMES DAILY
DISCHARGE
Start: 2019-05-02 | End: 2019-05-03

## 2019-05-02 RX ORDER — GUAIFENESIN 600 MG/1
600 TABLET, EXTENDED RELEASE ORAL 2 TIMES DAILY
DISCHARGE
Start: 2019-05-02 | End: 2019-05-03

## 2019-05-02 RX ORDER — LOSARTAN POTASSIUM 50 MG/1
TABLET ORAL
Qty: 135 TABLET | Refills: 3 | DISCHARGE
Start: 2019-05-02 | End: 2019-05-03

## 2019-05-02 RX ORDER — AMOXICILLIN 250 MG
1-2 CAPSULE ORAL 2 TIMES DAILY PRN
Qty: 30 TABLET | Refills: 0 | DISCHARGE
Start: 2019-05-02 | End: 2019-05-03

## 2019-05-02 RX ORDER — FOLIC ACID 1 MG/1
2 TABLET ORAL DAILY
DISCHARGE
Start: 2019-05-02 | End: 2019-05-03

## 2019-05-02 RX ORDER — BUMETANIDE 2 MG/1
2 TABLET ORAL DAILY
Qty: 45 TABLET | Refills: 3 | Status: SHIPPED | OUTPATIENT
Start: 2019-05-02 | End: 2019-05-03

## 2019-05-02 RX ORDER — ALBUTEROL SULFATE 90 UG/1
1-2 AEROSOL, METERED RESPIRATORY (INHALATION) EVERY 4 HOURS PRN
DISCHARGE
Start: 2019-05-02 | End: 2019-05-03

## 2019-05-02 RX ORDER — NIACIN 500 MG/1
500 TABLET, EXTENDED RELEASE ORAL DAILY
Refills: 9 | DISCHARGE
Start: 2019-05-02 | End: 2019-05-03

## 2019-05-02 RX ORDER — EPINEPHRINE 0.3 MG/.3ML
0.3 INJECTION SUBCUTANEOUS
Qty: 0.6 ML | Refills: 0 | DISCHARGE
Start: 2019-05-02 | End: 2019-05-03

## 2019-05-02 RX ORDER — POTASSIUM CHLORIDE 1.5 G/1.58G
40 POWDER, FOR SOLUTION ORAL ONCE
Status: DISCONTINUED | OUTPATIENT
Start: 2019-05-02 | End: 2019-05-02

## 2019-05-02 RX ORDER — TRAZODONE HYDROCHLORIDE 50 MG/1
TABLET, FILM COATED ORAL
Qty: 270 TABLET | Refills: 1 | DISCHARGE
Start: 2019-05-02 | End: 2019-05-03

## 2019-05-02 RX ORDER — ATORVASTATIN CALCIUM 40 MG/1
40 TABLET, FILM COATED ORAL DAILY
Qty: 30 TABLET | Refills: 0 | DISCHARGE
Start: 2019-05-02 | End: 2019-05-03

## 2019-05-02 RX ORDER — ESCITALOPRAM OXALATE 20 MG/1
20 TABLET ORAL EVERY MORNING
Qty: 30 TABLET | Refills: 0 | DISCHARGE
Start: 2019-05-02 | End: 2019-05-03

## 2019-05-02 RX ORDER — POTASSIUM CHLORIDE 750 MG/1
30 TABLET, EXTENDED RELEASE ORAL DAILY
Qty: 180 TABLET | Refills: 3 | DISCHARGE
Start: 2019-05-02 | End: 2019-05-03

## 2019-05-02 RX ORDER — FERROUS GLUCONATE 324(38)MG
324 TABLET ORAL
Qty: 36 TABLET | Refills: 3 | DISCHARGE
Start: 2019-05-03 | End: 2019-05-03

## 2019-05-02 RX ORDER — PREGABALIN 100 MG/1
100 CAPSULE ORAL 2 TIMES DAILY
Qty: 180 CAPSULE | Refills: 3 | Status: SHIPPED | DISCHARGE
Start: 2019-05-02 | End: 2019-05-03

## 2019-05-02 RX ORDER — LEVOTHYROXINE SODIUM 150 UG/1
150 TABLET ORAL DAILY
Qty: 30 TABLET | Refills: 0 | DISCHARGE
Start: 2019-05-02 | End: 2019-05-03

## 2019-05-02 RX ORDER — ACETAMINOPHEN 325 MG/1
650 TABLET ORAL EVERY 4 HOURS PRN
Qty: 100 TABLET | DISCHARGE
Start: 2019-05-02 | End: 2019-05-03

## 2019-05-02 RX ADMIN — GUAIFENESIN 600 MG: 600 TABLET, EXTENDED RELEASE ORAL at 08:59

## 2019-05-02 RX ADMIN — ATORVASTATIN CALCIUM 40 MG: 40 TABLET, FILM COATED ORAL at 19:56

## 2019-05-02 RX ADMIN — ACETAMINOPHEN 975 MG: 325 TABLET, FILM COATED ORAL at 09:31

## 2019-05-02 RX ADMIN — ACETAMINOPHEN 975 MG: 325 TABLET, FILM COATED ORAL at 14:14

## 2019-05-02 RX ADMIN — ACETAMINOPHEN 975 MG: 325 TABLET, FILM COATED ORAL at 19:55

## 2019-05-02 RX ADMIN — FOLIC ACID 1 MG: 1 TABLET ORAL at 08:58

## 2019-05-02 RX ADMIN — METOPROLOL TARTRATE 50 MG: 50 TABLET ORAL at 19:55

## 2019-05-02 RX ADMIN — TRAZODONE HYDROCHLORIDE 150 MG: 100 TABLET ORAL at 22:01

## 2019-05-02 RX ADMIN — NIACIN ER 500 MG TABLET,EXTENDED RELEASE 500 MG: at 08:58

## 2019-05-02 RX ADMIN — LOSARTAN POTASSIUM 25 MG: 25 TABLET, FILM COATED ORAL at 08:58

## 2019-05-02 RX ADMIN — PREGABALIN 100 MG: 100 CAPSULE ORAL at 19:55

## 2019-05-02 RX ADMIN — LOSARTAN POTASSIUM 50 MG: 50 TABLET ORAL at 22:01

## 2019-05-02 RX ADMIN — ASPIRIN 81 MG CHEWABLE TABLET 81 MG: 81 TABLET CHEWABLE at 08:58

## 2019-05-02 RX ADMIN — SENNOSIDES AND DOCUSATE SODIUM 1 TABLET: 8.6; 5 TABLET ORAL at 09:30

## 2019-05-02 RX ADMIN — POTASSIUM CHLORIDE 40 MEQ: 750 TABLET, EXTENDED RELEASE ORAL at 06:05

## 2019-05-02 RX ADMIN — BUMETANIDE 2 MG: 2 TABLET ORAL at 12:04

## 2019-05-02 RX ADMIN — LEVOTHYROXINE SODIUM 150 MCG: 150 TABLET ORAL at 08:59

## 2019-05-02 RX ADMIN — LIDOCAINE 1 PATCH: 560 PATCH PERCUTANEOUS; TOPICAL; TRANSDERMAL at 19:54

## 2019-05-02 RX ADMIN — POTASSIUM CHLORIDE 40 MEQ: 750 TABLET, EXTENDED RELEASE ORAL at 08:58

## 2019-05-02 RX ADMIN — PREGABALIN 100 MG: 100 CAPSULE ORAL at 09:31

## 2019-05-02 RX ADMIN — GUAIFENESIN 600 MG: 600 TABLET, EXTENDED RELEASE ORAL at 19:54

## 2019-05-02 RX ADMIN — POTASSIUM CHLORIDE 20 MEQ: 750 TABLET, EXTENDED RELEASE ORAL at 18:58

## 2019-05-02 RX ADMIN — ESCITALOPRAM 20 MG: 20 TABLET, FILM COATED ORAL at 08:58

## 2019-05-02 RX ADMIN — METOPROLOL TARTRATE 50 MG: 50 TABLET ORAL at 08:58

## 2019-05-02 RX ADMIN — POTASSIUM CHLORIDE 20 MEQ: 750 TABLET, EXTENDED RELEASE ORAL at 11:01

## 2019-05-02 ASSESSMENT — PAIN DESCRIPTION - DESCRIPTORS
DESCRIPTORS: SORE

## 2019-05-02 ASSESSMENT — ACTIVITIES OF DAILY LIVING (ADL)
ADLS_ACUITY_SCORE: 19
ADLS_ACUITY_SCORE: 19
ADLS_ACUITY_SCORE: 17

## 2019-05-02 ASSESSMENT — MIFFLIN-ST. JEOR: SCORE: 1988.47

## 2019-05-02 NOTE — PLAN OF CARE
Discharge Planner OT  [facility] [place]  Patient plan for discharge: Pt would prefer to go home, but still thinking about TCU  Current status: Facilitated increased activity tolerance and strength with ADLs. Pt progressed in TB dressing while seated EOB using no AD with setup and SBA. Pt completed functional mobility in hallway ~550ft to simulate household/community distance using 4WW with SBA-CGA and no rest breaks.  Barriers to return to prior living situation: medical status, activity tolerance, strength  Recommendations for discharge: Per plan established by the OT, the recommendation for dc location is TCU.  Rationale for recommendations: Pt will benefit from continued therapy to increase strength and activity tolerance to maximize functional IND.       Entered by: Evette Tello 05/02/2019 9:45 AM

## 2019-05-02 NOTE — PROGRESS NOTES
"Social Work Services Progress Note    Hospital Day: 7  Date of Initial Social Work Evaluation:  4/29/19  Collaborated with:  Pt, pt's roommate Odalis ([phone]), TCU admissions, medical team    Data:  Pt is a 73 year-old woman admitted to . SW was consulted for discharge planning.    Intervention:  [facility] TCU called this morning: they cannot take pt due to no bed availability anticipated until middle of next week. SW provided this update to pt.    Pt reports she does not want to go to TCU. SW educated pt on pt choice and the purpose of TCU. Pt states her roommate Odalis \"thinks I'm ready to go home, that I'll do more at home.\" Pt attempted to call Odalis from room phone with SW in room, had to leave voicemail. SW also attempted to call Odalis from desk phone, left vm encouraging her to call with questions. Pt given list of local TCUs to review. Pt with blunted affect, slow speech with long pauses in conversation. After significant deliberation she accepted additional referral to [facility].    SW had a conference call in the afternoon with Mariel and roommate Odalis. They identified 2 additional facilities that Mariel would be open to, and were open to further referrals in the search for an available bed. [facility]iel is currently agreeable toTCU, and Odalis is supportive.    Referrals  [facility] - pending   [phone] (p.) [phone] (f.)  [facility] - pending  [phone] (p.) [phone] (f.)  [facility] - pending  [phone] (p.) [phone] (f.)  [facility] - pending  [phone] (p.) [phone] (f.)  [facility] - pending  [phone] (p.) [phone] (f.)    [facility] - declined, no bariatric beds available  [phone] (p.) [phone] (f.)  [facility] - declined, no bariatric beds available  [phone] (p.) [phone] (fAlon)  [facility] - declined, no beds " available  [phone] (p.) [phone] (f.)      Assessment:  Pt remains hospitalized awaiting placement    Plan:    Anticipated Disposition:  Facility:  TBD    Barriers to d/c plan:  Medical readiness, bed availability    Follow Up:  SW will continue to remain available for discharge planning, other resources and support PRN.    NAKITA Watkins, [facility]   Pager: [phone]

## 2019-05-02 NOTE — PROGRESS NOTES
"Outpatient Physical Therapy Discharge Note     Patient: Mariel Ribeiro  : 1945    Beginning/End Dates of Reporting Period:  3/6/19 to 4/3/2019    Referring Provider:  Rafaela William MD    Therapy Diagnosis: Gait instability     Client Self Report: It's way too early for me. I've been using the cervical traction daily, sometimes 2x/day.    Objective Measurements:  Objective Measure: CSA  Details:     Objective Measure: Nustep  Details: WL2, seat 10, arms 9, 10 minutes, OMNI 0/10- \"energized\"    Objective Measure: Convergence  Details: 33cm, 40cm      Outcome Measures (most recent score):  Concussion Symptom Assessment (score out of 90). A higher score indicates greater impairment:      Goals:  Goal Identifier Falls risk/TUG   Goal Description Pt will complete TUG with LR AD in <21 sec, without instability or c/o dizziness, to demonstrate reduced falls risk.   Target Date 19   Date Met      Progress:     Goal Identifier Gait speed/25ft walk   Goal Description Pt will complete 25ft walk with LR AD in <15.5 sec, <18 steps, to demonstrate improved gait efficiency and reduced falls risk.   Target Date 19   Date Met      Progress:     Goal Identifier Falls prevention strategies   Goal Description Pt to verbalize 3-5 strategies to reduce her falls risk with household and community mobility.   Target Date 19   Date Met      Progress:     Goal Identifier Neck pain management   Goal Description Pt will verbalize 3-5 strategies to self-manage neck pain.   Target Date 19   Date Met      Progress:     Goal Identifier CSA   Goal Description Pt to demonstrate improved concussion related symptoms to better tolerate desired tasks including ADLs, household mobility, and water color painting, via CSA rating <30/90.   Target Date 19   Date Met      Progress:     Progress Toward Goals:   Progress limited due to change in medical status, pt admitted to the hospital.    Plan:  Discharge from " therapy.    Discharge:  Reason for Discharge: Change in medical status.    Equipment Issued: None    Discharge Plan: Patient to continue home program.

## 2019-05-02 NOTE — ADDENDUM NOTE
Encounter addended by: Digna Hayden, PT on: 5/2/2019 11:06 AM   Actions taken: Sign clinical note, Episode resolved

## 2019-05-02 NOTE — PLAN OF CARE
D: Pt admitted with SOB/volume overload.   I/A: SB/SR on monitor 50's-60's. Sleeping with CPAP. BG monitored. 40mEq K+ given this AM as part of PRN protocol repletion. 20mEq due yet. Weight up slightly. Pt up in chair this AM.   P: Continue to monitor and assess pt condition and contact treatment team with questions or concerns.

## 2019-05-02 NOTE — DISCHARGE SUMMARY
[facility]   Cardiology Discharge Summary      Date of Admission:  4/25/2019  Date of Discharge:  05/03/2019   Discharging Provider: Sultana Feliz MD/Brittany Fuentes PA-C    Discharge Instructions:  1. Please begin taking 2mg oral Bumex, once daily  2. Please begin taking 40 mEq of oral Potassium, once daily with Bumex  3. In regards to your insulin, please hold your NPH as your blood glucoses have been so well controlled; that being said, we ask that you please see your endocrinology team as soon as possible to decide if your NPH should be resumed. You will need to call your clinic on Monday morning and ask for an appointment as soon as possible. If you are UNABLE to get into your endocrinology clinic, please contact your primary care provider to discuss your diabetes within 1 week of discharge.  4. Please take prandin 0.5mg three times daily, with meals  5. Please follow up in [facility] clinic 05/07/2019 with Starla Torrez NP      Primary Care     Rafaela William  [address] [place] [address]      Identification and Chief Complaint: Mariel Ribeiro is a 73 year old female who presented on 4/25/2019 with complaint of shortness of breath and weight gain.    Discharge Diagnoses     Acute on Chronic diastolic heart failure (H)    Coronary atherosclerosis    Hypothyroidism    Migraine    Chronic airway obstruction/ COPD    Obstructive sleep apnea    Hypertension goal BP (blood pressure) < 130/80    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)    Hyperlipidemia with target LDL less than 70    Transient cerebral ischemia    S/P CABG x 3    Type 2 diabetes mellitus with stage 3 chronic kidney disease, with long-term current use of insulin (H)    Lymphedema      Discharge Disposition   Discharged to rehabilitation facility    Discharge Orders      Medication Therapy Management Referral      LYMPHEDEMA THERAPY REFERRAL      Wound Care Referral      Home care nursing referral       Mantoux instructions    Give two-step Mantoux (PPD) Per Facility Policy Yes     Reason for your hospital stay    Heart failure with preserved ejection fraction     Daily weights    Call Provider for weight gain of more than 2 pounds per day or 5 pounds per week.     Activity - Up ad vaughn     MD face to face encounter    Documentation of Face to Face and Certification for Home Health Services    I certify that patient: Mariel Ribeiro is under my care and that I, or a nurse practitioner or physician's assistant working with me, had a face-to-face encounter that meets the physician face-to-face encounter requirements with this patient on: 5/3/2019.    This encounter with the patient was in whole, or in part, for the following medical condition, which is the primary reason for home health care: HFpEF, CAD and DM2.    I certify that, based on my findings, the following services are medically necessary home health services: Nursing, Occupational Therapy and Physical Therapy.    My clinical findings support the need for the above services because: Nurse is needed: To assess after changes in medications or other medical regimen, Occupational Therapy Services are needed to assess and treat cognitive ability and address ADL safety due to impairment and Physical Therapy Services are needed to assess and treat the following functional impairments: deconditioning, strengthening and endurance.    Further, I certify that my clinical findings support that this patient is homebound (i.e. absences from home require considerable and taxing effort and are for medical reasons or [facility]ian[facility] services or infrequently or of short duration when for other reasons) because: Requires assistance of another person or specialized equipment to access medical services because patient: Is unable to exit home safely on own.    Based on the above findings. I certify that this patient is confined to the home and needs intermittent skilled nursing care,  physical therapy and/or speech therapy.  The patient is under my care, and I have initiated the establishment of the plan of care.  This patient will be followed by a physician who will periodically review the plan of care.  Physician/Provider to provide follow up care: Rafaela William    Attending hospital physician (the Medicare certified [place] provider): Sultana Feliz MD    Physician Signature: See electronic signature associated with these discharge orders.  Date: 5/3/2019     Fluid restriction    2000cc Q24 hours     Fall precautions     Advance Diet as Tolerated    Follow this diet upon discharge: Orders Placed This Encounter      Combination Diet 2 gm NA Diet;     Discharge Medications   Current Discharge Medication List      CONTINUE these medications which have CHANGED    Details   acetaminophen (TYLENOL) 325 MG tablet Take 2 tablets (650 mg) by mouth every 4 hours as needed for mild pain  Qty: 100 tablet    Associated Diagnoses: S/P CABG x 3; Acute post-operative pain      albuterol (PROAIR HFA) 108 (90 Base) MCG/ACT inhaler Inhale 1-2 puffs into the lungs every 4 hours as needed for wheezing  Qty: 8.5 g    Associated Diagnoses: Chronic obstructive pulmonary disease, unspecified COPD type (H)      aspirin (ASA) 81 MG EC tablet Take 1 tablet (81 mg) by mouth daily    Associated Diagnoses: (HFpEF) heart failure with preserved ejection fraction (H)      atorvastatin (LIPITOR) 40 MG tablet Take 1 tablet (40 mg) by mouth daily  Qty: 30 tablet, Refills: 0    Associated Diagnoses: Atherosclerosis of native coronary artery without angina pectoris, unspecified whether native or transplanted heart      bumetanide (BUMEX) 2 MG tablet Take 1 tablet (2 mg) by mouth daily  Qty: 45 tablet, Refills: 3    Associated Diagnoses: Chronic diastolic heart failure (H)      EPINEPHrine (EPIPEN/ADRENACLICK/OR ANY BX GENERIC EQUIV) 0.3 MG/0.3ML injection 2-pack Inject 0.3 mLs (0.3 mg) into the muscle once as needed for  anaphylaxis  Qty: 0.6 mL, Refills: 0    Associated Diagnoses: Allergic reaction, subsequent encounter      escitalopram (LEXAPRO) 20 MG tablet Take 1 tablet (20 mg) by mouth every morning  Qty: 30 tablet, Refills: 0    Associated Diagnoses: Recurrent major depressive disorder, in partial remission (H)      ferrous gluconate (FERGON) 324 (38 Fe) MG tablet Take 1 tablet (324 mg) by mouth Every Mon, Wed, Fri Morning  Qty: 36 tablet, Refills: 3    Associated Diagnoses: Chronic diastolic heart failure (H); Iron deficiency anemia secondary to inadequate dietary iron intake      folic acid (FOLVITE) 1 MG tablet Take 2 tablets (2 mg) by mouth daily    Associated Diagnoses: (HFpEF) heart failure with preserved ejection fraction (H)      guaiFENesin (MUCINEX) 600 MG 12 hr tablet Take 1 tablet (600 mg) by mouth 2 times daily    Associated Diagnoses: (HFpEF) heart failure with preserved ejection fraction (H)      levothyroxine (SYNTHROID/LEVOTHROID) 150 MCG tablet Take 1 tablet (150 mcg) by mouth daily  Qty: 30 tablet, Refills: 0    Associated Diagnoses: Other specified hypothyroidism      losartan (COZAAR) 50 MG tablet Take 1 tablet (50 mg) by mouth every morning AND 0.5 tablets (25 mg) every evening.  Qty: 135 tablet, Refills: 3    Associated Diagnoses: Chronic diastolic heart failure (H)      metoprolol tartrate (LOPRESSOR) 50 MG tablet Take 1 tablet (50 mg) by mouth 2 times daily  Qty: 60 tablet, Refills: 3    Associated Diagnoses: (HFpEF) heart failure with preserved ejection fraction (H)      niacin ER (NIASPAN) 500 MG CR tablet Take 1 tablet (500 mg) by mouth daily  Refills: 9    Associated Diagnoses: (HFpEF) heart failure with preserved ejection fraction (H)      nitroGLYcerin (NITROSTAT) 0.4 MG sublingual tablet For chest pain place 1 tablet under the tongue every 5 minutes for 3 doses. If symptoms persist 5 minutes after 1st dose call 911.  Qty: 25 tablet, Refills: 0    Associated Diagnoses: Atherosclerosis of native  coronary artery without angina pectoris, unspecified whether native or transplanted heart      potassium chloride ER (K-DUR/KLOR-CON M) 10 MEQ CR tablet Take 4 tablets (40 mEq) by mouth daily  Qty: 120 tablet, Refills: 3    Associated Diagnoses: Chronic diastolic heart failure (H)      pregabalin (LYRICA) 100 MG capsule Take 1 capsule (100 mg) by mouth 2 times daily  Qty: 180 capsule, Refills: 3    Associated Diagnoses: Pain in joint of left shoulder      repaglinide (PRANDIN) 0.5 MG tablet Take 1 tablet (0.5 mg) by mouth 3 times daily (before meals)  Qty: 90 tablet, Refills: 3    Associated Diagnoses: Type 2 diabetes mellitus with stage 3 chronic kidney disease, with long-term current use of insulin (H)      senna-docusate (SENOKOT-S/PERICOLACE) 8.6-50 MG tablet Take 1-2 tablets by mouth 2 times daily as needed for constipation  Qty: 30 tablet, Refills: 0    Associated Diagnoses: Atherosclerosis of native coronary artery without angina pectoris, unspecified whether native or transplanted heart      traZODone (DESYREL) 50 MG tablet TAKE 3 TABLETS(150 MG) BY MOUTH AT BEDTIME  Qty: 270 tablet, Refills: 1    Associated Diagnoses: Insomnia, unspecified type      vitamin D3 (CHOLECALCIFEROL) 20517 units (250 mcg) capsule Take 1 capsule (10,000 Units) by mouth daily    Associated Diagnoses: (HFpEF) heart failure with preserved ejection fraction (H)         CONTINUE these medications which have NOT CHANGED    Details   blood glucose (NO BRAND SPECIFIED) test strip 1 strip by In Vitro route 4 times daily Strips per patient's preference  Qty: 400 each, Refills: 1    Associated Diagnoses: Type 2 diabetes mellitus with diabetic polyneuropathy, without long-term current use of insulin (H)      blood glucose monitoring (NO BRAND SPECIFIED) meter device kit Use to test blood sugar four times daily or as directed.  Qty: 1 kit, Refills: 0    Comments: One touch mini  Associated Diagnoses: Type 2 diabetes mellitus with stage 3  chronic kidney disease, with long-term current use of insulin (H)      LIFESCAN FINEPOINT LANCETS MISC Use to test blood sugars 2 times daily or as directed.  Qty: 100 each, Refills: prn    Associated Diagnoses: Type 2 diabetes mellitus with diabetic polyneuropathy, without long-term current use of insulin (H)      ONE TOUCH ULTRA (DEVICES) MISC test blood sugar BID  Qty: 1, Refills: 0    Associated Diagnoses: Type II or unspecified type diabetes mellitus without mention of complication, not stated as uncontrolled         STOP taking these medications       insulin isophane human (HUMULIN N KWIKPEN) 100 UNIT/ML injection Comments:   Reason for Stopping:             Allergies   Allergies   Allergen Reactions     Contrast Dye Anaphylaxis     Fish Allergy Anaphylaxis     Iodine Anaphylaxis     Oxycodone Other (See Comments)     Severe suicidal tendencies on this medication     Tree Nuts [Nuts] Anaphylaxis     Tree nuts only (peanuts ok)     Albuterol Other (See Comments)     Sandrita-oral erythema  Confirmed 7/3/18 that patient uses albuterol inhalers at home. Patient had her home inhaler in her bag.     Bactrim      Increased uric acid     Betadine [Povidone Iodine] Hives, Swelling and Difficulty breathing     Betadine     Combivent      Rash     Dulaglutide Other (See Comments)     Hx . Of thyroid cancer.     Fish      Lisinopril Other (See Comments)     Scr/grf severely reduced.      Penicillins Rash     Allopurinol Rash     Latex Rash     Wool Fiber Rash       Consultations This Hospital Stay  MEDICATION HISTORY IP PHARMACY CONSULT  PHYSICAL THERAPY ADULT IP CONSULT  OCCUPATIONAL THERAPY ADULT IP CONSULT  CORE CLINIC EVALUATION IP CONSULT  LYMPHEDEMA THERAPY IP CONSULT  VASCULAR ACCESS CARE ADULT IP CONSULT  NEUROLOGY STROKE ADULT IP CONSULT  WOUND OSTOMY CONTINENCE NURSE  IP CONSULT  SPIRITUAL HEALTH SERVICES IP CONSULT    Significant Results and Procedures   None    Data   Results for orders placed or performed  during the hospital encounter of 04/25/19   Chest XR,  PA & LAT    Narrative    XR CHEST 2 VW  4/25/2019 3:23 PM      HISTORY: right chest pain    COMPARISON: 1/28/2019    FINDINGS: The upper abdomen is unremarkable. The cardiac silhouette is  mildly enlarged, but stable. Sternotomy wires are present. No  pneumothorax or pleural effusion. Increased pulmonary vascular  congestion. No focal airspace opacities. Superior  mediastinal/cervicothoracic surgical clips      Impression    IMPRESSION:   1. Worsening pulmonary vascular congestion, likely representing  pulmonary edema.   2. Enlarged cardiac silhouette.    I have personally reviewed the examination and initial interpretation  and I agree with the findings.    RAFITA ENGLAND MD   Lung vent and perf (NM)    Narrative    Exam: Ventilation/perfusion scan, 4/25/2019 8:05 PM    Indication: PE suspected. Positive d-dimer. Dyspnea.    Comparison: Earlier 4/25/2018.    TECHNIQUE: Approximately 2 mCi of technetium 99 DTPA administered in  an aerosolized fashion. Ventilation planar images generated by the  technologist, using a gamma camera. Subsequently, 7 mCi of technetium  99 MAA were administered intravenously and imaged in a similar manner.    Findings:   There is a small matched defect in the left lower lobe. No perfusion  defects noted.      Impression    Impression: Negative for pulmonary embolism.     I have personally reviewed the examination and initial interpretation  and I agree with the findings.    REE WILSON MD     *Note: Due to a large number of results and/or encounters for the requested time period, some results have not been displayed. A complete set of results can be found in Results Review.     Hospital Course   Mariel Ribeiro was admitted on 4/25/2019.  The following problems were addressed during her hospitalization:    Acute on Chronic Heart Failure with Preserved Ejection Fraction  The patient presented with progressively worsening  SOB/orthopnea, weight gain of approximately 30 lbs (baseline weight approximately 300-310lbs), and edema of her lower extremities/abdomen. CXR was remarkable for pulmonary edema. Notably, the patient has had multiple admission for acute on chronic HFpEF following her CABG 07/2018; these are consistently due to dietary indiscretion and medication non-compliance. On admission, there was no other obvious etiology for her shortness of breath. The patient had no EKG changes and troponin's were negative; as such, ACS was unlikely.  Similarly, there was no evidence of PNA such as leukocytosis or focal consolidation on CXR on admission. Finally, a VQ scan was negative for PE.  The patient's admission Echo showed an EF decrease of 35-40% from 40-45% with dilated IVC. The patient was initiated on 2mg IV Bumex BID in addition to metolazone.  The patient was maintained on Bumex this admission and received 1 additional dose of metolazone 05/01/2019.  She was diuresed a total of 13L and 8kg.  She will discharge to home on 2mg oral Bumex once daily in the hopes that she continues to gently diurese.  In addition, we have ordered a 2 gram sodium and 2L fluid restriction diet for the patient to follow at home.  The patient will continue outpatient Lymphedema and CORE consultations/appointments. The patient was discharged to home with homecare in the hopes to improve mobility, balance, and strength. The patient will be seen in [facility] clinic 05/07/2019 for follow up.    Confusion, Left Sided Weakness, Monoptic Diplopia  Onset noted around 10:30am 04/30/2019. Stroke neurology called emergently.  Exam/findings consist with complex migraine in the setting of previous symptoms with negative imaging. Neurologic findings remained stable 72 hours/throughout the rest of her admission.    Right Groin Fold Wound   Noted this admission; a consult for wound RN will be placed at discharge for ongoing management.     CAD status post CABG 07/2018, HLD,  HTN  The patient has a history of multiple previous PCIs; given three vessel disease noted 072018, she underwent CABG.  The patient's CABG was complicated by a prolonged hospital course secondary to weakness. She was maintained on goal directed medical therapy this admission, as below.  - Antiplatelet: ASA 81 mg daily   - Statin: Lipitor 40 mg daily   - BB: Metoprolol 50 mg BID (reduced from PTA given relative bradycardia)  - ACE: 25/50mg losartan daily    DM II   On admission, the patient's NPH and prandin were held. Her blood glucoses, with the use of medium sliding scale insulin, were within normal limits (ranging from , with most blood glucoses between 100-160).  Diabetes regimen was discussed with endocrinology prior to discharge; she will continue 0.5mg prandin three times daily with meals on discharge.  She will hold her NPH on discharge and will be seen in either endocrinology (with whom she currently follows for diabetes) or primary care as soon as possible to discuss if she should resume her NPH.    CKD III   Creatinine baseline is 1.4-1.5. The patient had a slight increase of her creatinine mid-admission, which improved with ongoing diuresis. At discharge, the patient's creatinine was 1.86; while elevated from baseline, we suspect that this will continue to improve as the patient's body reaches physiological equilibration of fluid (shift from subcutaneous fat tissue to intravascular space).     Hypercapnia - resolved  The patient had increased somnolence on 4/27 in AM and was found to have increased CO2 on VBG to 68. After placement of BiPAP she had improvement in cognition. The patient had no further episodes of somnolence secondary to hypercapnia.  We ask that she continue to use her PTA CPAP on discharge.     Other chronic issues:   COPD - no overt wheeze/exacerbation this admission.  She will continue her COPD therapies at discharge.  Mononeuronitis/Osteoarthritis - She will continue PTA lyrica  on discharge.  Vitamin D deficiency - She will continue PTA supplementation at discharge  Hypothyroid - She will continue PTA levothyroxine at discharge.  Depression - She will continue her PTA lexapro, trazodone at discharge.  BELEN - The patient was maintained on in-house BiPAP this admission.  She will need to continue her outpatient therapies with home CPAP machine at discharge.        Pending Results   Unresulted Labs Ordered in the Past 30 Days of this Admission     No orders found from 2/24/2019 to 4/26/2019.        Physical Exam   Temp:  [97.4  F (36.3  C)-98.3  F (36.8  C)] 97.4  F (36.3  C)  Heart Rate:  [52-69] 61  Resp:  [16-18] 18  BP: (120-131)/(45-66) 120/50  FiO2 (%):  [30 %] 30 %  SpO2:  [92 %-98 %] 94 %  Vitals:    05/01/19 0411 05/02/19 0558 05/03/19 0430   Weight: 145.1 kg (319 lb 12.8 oz) 145.4 kg (320 lb 8 oz) 145.8 kg (321 lb 8 oz)     Constitutional: alert and oriented x4, in no acute distress  CV: Regular rate/rhythm. No appreciable murmur, rub, gallop  Respiratory: CTA bilaterally. No appreciable rales/rhonchi  GI: Soft, nontender  Skin: Warm and dry  Neuro: no focal deficit    The discharge plan was discussed with the patient and patient agrees to plan    Total time on this discharge was greater than 30 minutes.    This discharge was discussed with Dr. Sultana Feliz.      Brittany Fuentes PA-C     ATTENDING ATTESTATION:  Patient has been seen and evaluated by me on May 3, 2019. I agree with the discharge summary note above. I have reviewed pertinent labs and studies. More than 30 minutes was spent organizing this patient's hospital discharge.     Sultana Feliz MD, MS  Staff Cardiologist, [facility]   Pager: [phone]

## 2019-05-03 ENCOUNTER — APPOINTMENT (OUTPATIENT)
Dept: OCCUPATIONAL THERAPY | Facility: CLINIC | Age: 74
DRG: 291 | End: 2019-05-03
Payer: MEDICARE

## 2019-05-03 ENCOUNTER — HOME CARE/HOSPICE - HEALTHEAST (OUTPATIENT)
Dept: HOME HEALTH SERVICES | Facility: HOME HEALTH | Age: 74
End: 2019-05-03

## 2019-05-03 ENCOUNTER — APPOINTMENT (OUTPATIENT)
Dept: PHYSICAL THERAPY | Facility: CLINIC | Age: 74
DRG: 291 | End: 2019-05-03
Payer: MEDICARE

## 2019-05-03 VITALS
BODY MASS INDEX: 45.99 KG/M2 | HEART RATE: 73 BPM | WEIGHT: 293 LBS | DIASTOLIC BLOOD PRESSURE: 50 MMHG | HEIGHT: 67 IN | SYSTOLIC BLOOD PRESSURE: 120 MMHG | RESPIRATION RATE: 18 BRPM | TEMPERATURE: 97.4 F | OXYGEN SATURATION: 94 %

## 2019-05-03 PROBLEM — R55 SYNCOPE: Status: RESOLVED | Noted: 2018-08-15 | Resolved: 2019-05-03

## 2019-05-03 PROBLEM — R29.6 FALLS: Status: RESOLVED | Noted: 2018-09-10 | Resolved: 2019-05-03

## 2019-05-03 PROBLEM — D69.6 THROMBOCYTOPENIA (H): Status: RESOLVED | Noted: 2018-09-12 | Resolved: 2019-05-03

## 2019-05-03 PROBLEM — M54.50 LOWER BACK PAIN: Chronic | Status: ACTIVE | Noted: 2018-09-10

## 2019-05-03 PROBLEM — I50.30 (HFPEF) HEART FAILURE WITH PRESERVED EJECTION FRACTION (H): Status: RESOLVED | Noted: 2019-04-25 | Resolved: 2019-05-03

## 2019-05-03 PROBLEM — I20.89 ANGINA AT REST (H): Chronic | Status: ACTIVE | Noted: 2018-06-29

## 2019-05-03 PROBLEM — K80.20 CHOLELITHIASIS: Chronic | Status: RESOLVED | Noted: 2017-05-17 | Resolved: 2019-05-03

## 2019-05-03 PROBLEM — Z95.1 S/P CABG X 3: Chronic | Status: ACTIVE | Noted: 2018-07-02

## 2019-05-03 PROBLEM — N18.30 TYPE 2 DIABETES MELLITUS WITH STAGE 3 CHRONIC KIDNEY DISEASE, WITH LONG-TERM CURRENT USE OF INSULIN (H): Chronic | Status: ACTIVE | Noted: 2018-07-28

## 2019-05-03 PROBLEM — I77.9 BILATERAL CAROTID ARTERY DISEASE (H): Chronic | Status: ACTIVE | Noted: 2018-09-12

## 2019-05-03 PROBLEM — M48.02 CERVICAL STENOSIS OF SPINAL CANAL: Status: RESOLVED | Noted: 2018-09-13 | Resolved: 2019-05-03

## 2019-05-03 PROBLEM — K85.90 PANCREATITIS, ACUTE: Status: RESOLVED | Noted: 2017-08-08 | Resolved: 2019-05-03

## 2019-05-03 PROBLEM — E11.22 TYPE 2 DIABETES MELLITUS WITH STAGE 3 CHRONIC KIDNEY DISEASE, WITH LONG-TERM CURRENT USE OF INSULIN (H): Chronic | Status: ACTIVE | Noted: 2018-07-28

## 2019-05-03 PROBLEM — Z79.4 TYPE 2 DIABETES MELLITUS WITH STAGE 3 CHRONIC KIDNEY DISEASE, WITH LONG-TERM CURRENT USE OF INSULIN (H): Chronic | Status: ACTIVE | Noted: 2018-07-28

## 2019-05-03 PROBLEM — I89.0 LYMPHEDEMA: Chronic | Status: ACTIVE | Noted: 2018-07-28

## 2019-05-03 LAB
ANION GAP SERPL CALCULATED.3IONS-SCNC: 6 MMOL/L (ref 3–14)
BUN SERPL-MCNC: 68 MG/DL (ref 7–30)
CALCIUM SERPL-MCNC: 9.6 MG/DL (ref 8.5–10.1)
CHLORIDE SERPL-SCNC: 99 MMOL/L (ref 94–109)
CO2 SERPL-SCNC: 32 MMOL/L (ref 20–32)
CREAT SERPL-MCNC: 1.86 MG/DL (ref 0.52–1.04)
ERYTHROCYTE [DISTWIDTH] IN BLOOD BY AUTOMATED COUNT: 12.8 % (ref 10–15)
GFR SERPL CREATININE-BSD FRML MDRD: 26 ML/MIN/{1.73_M2}
GLUCOSE BLDC GLUCOMTR-MCNC: 148 MG/DL (ref 70–99)
GLUCOSE BLDC GLUCOMTR-MCNC: 178 MG/DL (ref 70–99)
GLUCOSE SERPL-MCNC: 174 MG/DL (ref 70–99)
HCT VFR BLD AUTO: 38.2 % (ref 35–47)
HGB BLD-MCNC: 11.5 G/DL (ref 11.7–15.7)
MCH RBC QN AUTO: 28.5 PG (ref 26.5–33)
MCHC RBC AUTO-ENTMCNC: 30.1 G/DL (ref 31.5–36.5)
MCV RBC AUTO: 95 FL (ref 78–100)
PLATELET # BLD AUTO: 118 10E9/L (ref 150–450)
POTASSIUM SERPL-SCNC: 3.4 MMOL/L (ref 3.4–5.3)
RBC # BLD AUTO: 4.04 10E12/L (ref 3.8–5.2)
SODIUM SERPL-SCNC: 137 MMOL/L (ref 133–144)
WBC # BLD AUTO: 6.7 10E9/L (ref 4–11)

## 2019-05-03 PROCEDURE — 25000132 ZZH RX MED GY IP 250 OP 250 PS 637: Performed by: STUDENT IN AN ORGANIZED HEALTH CARE EDUCATION/TRAINING PROGRAM

## 2019-05-03 PROCEDURE — A9270 NON-COVERED ITEM OR SERVICE: HCPCS | Performed by: STUDENT IN AN ORGANIZED HEALTH CARE EDUCATION/TRAINING PROGRAM

## 2019-05-03 PROCEDURE — A9270 NON-COVERED ITEM OR SERVICE: HCPCS | Performed by: PHYSICIAN ASSISTANT

## 2019-05-03 PROCEDURE — 25000132 ZZH RX MED GY IP 250 OP 250 PS 637: Performed by: PHYSICIAN ASSISTANT

## 2019-05-03 PROCEDURE — 25000132 ZZH RX MED GY IP 250 OP 250 PS 637: Performed by: INTERNAL MEDICINE

## 2019-05-03 PROCEDURE — 97140 MANUAL THERAPY 1/> REGIONS: CPT | Mod: GO | Performed by: OCCUPATIONAL THERAPIST

## 2019-05-03 PROCEDURE — 80048 BASIC METABOLIC PNL TOTAL CA: CPT | Performed by: PHYSICIAN ASSISTANT

## 2019-05-03 PROCEDURE — 97116 GAIT TRAINING THERAPY: CPT | Mod: GP

## 2019-05-03 PROCEDURE — 40000275 ZZH STATISTIC RCP TIME EA 10 MIN

## 2019-05-03 PROCEDURE — 97535 SELF CARE MNGMENT TRAINING: CPT | Mod: GO | Performed by: OCCUPATIONAL THERAPIST

## 2019-05-03 PROCEDURE — 85027 COMPLETE CBC AUTOMATED: CPT | Performed by: PHYSICIAN ASSISTANT

## 2019-05-03 PROCEDURE — A9270 NON-COVERED ITEM OR SERVICE: HCPCS | Performed by: INTERNAL MEDICINE

## 2019-05-03 PROCEDURE — 97530 THERAPEUTIC ACTIVITIES: CPT | Mod: GP

## 2019-05-03 PROCEDURE — 99239 HOSP IP/OBS DSCHRG MGMT >30: CPT | Performed by: INTERNAL MEDICINE

## 2019-05-03 PROCEDURE — 36415 COLL VENOUS BLD VENIPUNCTURE: CPT | Performed by: PHYSICIAN ASSISTANT

## 2019-05-03 PROCEDURE — 97110 THERAPEUTIC EXERCISES: CPT | Mod: GP

## 2019-05-03 PROCEDURE — 94660 CPAP INITIATION&MGMT: CPT

## 2019-05-03 PROCEDURE — 00000146 ZZHCL STATISTIC GLUCOSE BY METER IP

## 2019-05-03 RX ORDER — LOSARTAN POTASSIUM 50 MG/1
TABLET ORAL
Qty: 135 TABLET | Refills: 3 | COMMUNITY
Start: 2019-05-03 | End: 2019-05-29 | Stop reason: DRUGHIGH

## 2019-05-03 RX ORDER — AMOXICILLIN 250 MG
1-2 CAPSULE ORAL 2 TIMES DAILY PRN
Qty: 30 TABLET | Refills: 0 | COMMUNITY
Start: 2019-05-03 | End: 2021-01-15

## 2019-05-03 RX ORDER — PREGABALIN 100 MG/1
100 CAPSULE ORAL 2 TIMES DAILY
Qty: 180 CAPSULE | Refills: 3 | COMMUNITY
Start: 2019-05-03 | End: 2020-04-16

## 2019-05-03 RX ORDER — GUAIFENESIN 600 MG/1
600 TABLET, EXTENDED RELEASE ORAL 2 TIMES DAILY
COMMUNITY
Start: 2019-05-03 | End: 2022-12-08

## 2019-05-03 RX ORDER — POTASSIUM CHLORIDE 1500 MG/1
40 TABLET, EXTENDED RELEASE ORAL DAILY
Qty: 60 TABLET | Refills: 3 | Status: SHIPPED | OUTPATIENT
Start: 2019-05-03 | End: 2019-05-03

## 2019-05-03 RX ORDER — REPAGLINIDE 0.5 MG/1
0.5 TABLET ORAL
Qty: 90 TABLET | Refills: 3 | Status: SHIPPED | OUTPATIENT
Start: 2019-05-03 | End: 2019-05-29

## 2019-05-03 RX ORDER — LANOLIN ALCOHOL/MO/W.PET/CERES
400 CREAM (GRAM) TOPICAL DAILY
COMMUNITY
Start: 2019-05-03 | End: 2022-12-08

## 2019-05-03 RX ORDER — NIACIN 500 MG/1
500 TABLET, EXTENDED RELEASE ORAL DAILY
Refills: 9 | COMMUNITY
Start: 2019-05-03 | End: 2019-09-05

## 2019-05-03 RX ORDER — METOPROLOL TARTRATE 50 MG
50 TABLET ORAL 2 TIMES DAILY
Qty: 60 TABLET | Refills: 3 | Status: SHIPPED | OUTPATIENT
Start: 2019-05-03 | End: 2019-05-29

## 2019-05-03 RX ORDER — NITROGLYCERIN 0.4 MG/1
TABLET SUBLINGUAL
Qty: 25 TABLET | Refills: 0 | COMMUNITY
Start: 2019-05-03 | End: 2022-03-16

## 2019-05-03 RX ORDER — ALBUTEROL SULFATE 90 UG/1
1-2 AEROSOL, METERED RESPIRATORY (INHALATION) EVERY 4 HOURS PRN
Qty: 8.5 G | COMMUNITY
Start: 2019-05-03 | End: 2020-08-19 | Stop reason: ALTCHOICE

## 2019-05-03 RX ORDER — POTASSIUM CHLORIDE 750 MG/1
40 TABLET, EXTENDED RELEASE ORAL DAILY
Qty: 120 TABLET | Refills: 3 | Status: SHIPPED | OUTPATIENT
Start: 2019-05-03 | End: 2019-05-07

## 2019-05-03 RX ORDER — ACETAMINOPHEN 325 MG/1
650 TABLET ORAL EVERY 4 HOURS PRN
Qty: 100 TABLET | Status: ON HOLD | COMMUNITY
Start: 2019-05-03 | End: 2022-07-16

## 2019-05-03 RX ORDER — LEVOTHYROXINE SODIUM 150 UG/1
150 TABLET ORAL DAILY
Qty: 30 TABLET | Refills: 0 | COMMUNITY
Start: 2019-05-03 | End: 2019-06-21

## 2019-05-03 RX ORDER — BUMETANIDE 2 MG/1
2 TABLET ORAL DAILY
Qty: 45 TABLET | Refills: 3 | Status: SHIPPED | OUTPATIENT
Start: 2019-05-03 | End: 2019-05-07

## 2019-05-03 RX ORDER — ATORVASTATIN CALCIUM 40 MG/1
40 TABLET, FILM COATED ORAL DAILY
Qty: 30 TABLET | Refills: 0 | COMMUNITY
Start: 2019-05-03 | End: 2019-06-26

## 2019-05-03 RX ORDER — EPINEPHRINE 0.3 MG/.3ML
0.3 INJECTION SUBCUTANEOUS
Qty: 0.6 ML | Refills: 0 | COMMUNITY
Start: 2019-05-03 | End: 2020-12-18

## 2019-05-03 RX ORDER — FERROUS GLUCONATE 324(38)MG
324 TABLET ORAL
Qty: 36 TABLET | Refills: 3 | COMMUNITY
Start: 2019-05-03 | End: 2019-09-18

## 2019-05-03 RX ORDER — POTASSIUM CHLORIDE 750 MG/1
40 TABLET, EXTENDED RELEASE ORAL DAILY
Qty: 180 TABLET | Refills: 3 | DISCHARGE
Start: 2019-05-03 | End: 2019-05-03

## 2019-05-03 RX ORDER — ESCITALOPRAM OXALATE 20 MG/1
20 TABLET ORAL EVERY MORNING
Qty: 30 TABLET | Refills: 0 | COMMUNITY
Start: 2019-05-03 | End: 2019-05-30

## 2019-05-03 RX ORDER — TRAZODONE HYDROCHLORIDE 50 MG/1
TABLET, FILM COATED ORAL
Qty: 270 TABLET | Refills: 1 | COMMUNITY
Start: 2019-05-03 | End: 2019-09-19

## 2019-05-03 RX ADMIN — FERROUS GLUCONATE 324 MG: 324 TABLET ORAL at 08:25

## 2019-05-03 RX ADMIN — ACETAMINOPHEN 975 MG: 325 TABLET, FILM COATED ORAL at 13:49

## 2019-05-03 RX ADMIN — METOPROLOL TARTRATE 50 MG: 50 TABLET ORAL at 08:26

## 2019-05-03 RX ADMIN — ACETAMINOPHEN 975 MG: 325 TABLET, FILM COATED ORAL at 08:40

## 2019-05-03 RX ADMIN — LEVOTHYROXINE SODIUM 150 MCG: 150 TABLET ORAL at 08:26

## 2019-05-03 RX ADMIN — GUAIFENESIN 600 MG: 600 TABLET, EXTENDED RELEASE ORAL at 08:26

## 2019-05-03 RX ADMIN — FOLIC ACID 1 MG: 1 TABLET ORAL at 08:26

## 2019-05-03 RX ADMIN — BUMETANIDE 2 MG: 2 TABLET ORAL at 08:25

## 2019-05-03 RX ADMIN — NIACIN ER 500 MG TABLET,EXTENDED RELEASE 500 MG: at 08:25

## 2019-05-03 RX ADMIN — PREGABALIN 100 MG: 100 CAPSULE ORAL at 08:40

## 2019-05-03 RX ADMIN — ESCITALOPRAM 20 MG: 20 TABLET, FILM COATED ORAL at 08:25

## 2019-05-03 RX ADMIN — LOSARTAN POTASSIUM 25 MG: 25 TABLET, FILM COATED ORAL at 08:25

## 2019-05-03 RX ADMIN — POTASSIUM CHLORIDE 40 MEQ: 750 TABLET, EXTENDED RELEASE ORAL at 08:26

## 2019-05-03 RX ADMIN — ASPIRIN 81 MG CHEWABLE TABLET 81 MG: 81 TABLET CHEWABLE at 08:25

## 2019-05-03 RX ADMIN — POTASSIUM CHLORIDE 20 MEQ: 750 TABLET, EXTENDED RELEASE ORAL at 15:38

## 2019-05-03 ASSESSMENT — ACTIVITIES OF DAILY LIVING (ADL)
ADLS_ACUITY_SCORE: 18
ADLS_ACUITY_SCORE: 18
ADLS_ACUITY_SCORE: 19
ADLS_ACUITY_SCORE: 18
ADLS_ACUITY_SCORE: 18

## 2019-05-03 ASSESSMENT — MIFFLIN-ST. JEOR: SCORE: 1993

## 2019-05-03 ASSESSMENT — PAIN DESCRIPTION - DESCRIPTORS: DESCRIPTORS: DULL;DISCOMFORT

## 2019-05-03 NOTE — PROGRESS NOTES
[facility]   Cardiology Progress      Interval History:  Reports breathing is at baseline.  Denies fever, chills, nausea, vomiting, CP, cough, abdominal pain, dysuria, LE pain/swelling.    Physical Exam:  Temp:  [97.6  F (36.4  C)-98.3  F (36.8  C)] 97.6  F (36.4  C)  Heart Rate:  [52-69] 61  Resp:  [16-18] 18  BP: (120-131)/(45-66) 120/50  FiO2 (%):  [30 %] 30 %  SpO2:  [92 %-98 %] 94 %      Intake/Output Summary (Last 24 hours) at 5/3/2019 1217  Last data filed at 5/3/2019 0500  Gross per 24 hour   Intake 720 ml   Output 2350 ml   Net -1630 ml     Weight:  Baseline: ~300 lbs.  Admission: 338 lbs.  Today: 321 lbs.    GEN: NAD  Pulm: CTAB, no rales/rhonchi  Cardiac: Normal S1 and S2. JVP not elevated, no murmurs.   GI: soft, non distended  Neuro:  Alert and oriented x4.    Medications:    aspirin  81 mg Oral Daily     atorvastatin  40 mg Oral Daily     bumetanide  2 mg Oral Daily     escitalopram  20 mg Oral QAM     ferrous gluconate  324 mg Oral Q Mon Wed Fri AM     folic acid  1 mg Oral Daily     guaiFENesin  600 mg Oral BID     insulin aspart  1-7 Units Subcutaneous TID AC     insulin aspart  1-5 Units Subcutaneous At Bedtime     levothyroxine  150 mcg Oral Daily     lidocaine  1 patch Transdermal Q24h    And     lidocaine   Transdermal Q24H    And     lidocaine   Transdermal Q8H     losartan  25 mg Oral Daily     losartan  50 mg Oral At Bedtime     metoprolol tartrate  50 mg Oral BID     niacin  500 mg Oral Daily     potassium chloride  40 mEq Oral Daily     pregabalin  100 mg Oral BID     senna-docusate  1 tablet Oral BID    Or     senna-docusate  2 tablet Oral BID     sodium chloride (PF)  3 mL Intracatheter Q8H     traZODone  150 mg Oral At Bedtime       - MEDICATION INSTRUCTIONS -         Labs:   CMP  Recent Labs   Lab 05/03/19  0453 05/02/19  1734 05/02/19  0517 05/01/19  1612 05/01/19  0444  04/29/19  0426 04/28/19  1714 04/28/19  0553    138 139 139 139   < > 139  138 139   POTASSIUM 3.4 3.9 3.2* 3.8 3.4   < > 3.6 3.8 3.7   CHLORIDE 99 98 98 99 99   < > 100 99 102   CO2 32 32 32 33* 32   < > 30 30 29   ANIONGAP 6 9 10 7 8   < > 8 9 8   * 178* 192* 144* 152*   < > 145* 196* 157*   BUN 68* 66* 62* 53* 52*   < > 46* 44* 40*   CR 1.86* 1.88* 1.83* 1.77* 1.67*   < > 1.86* 2.01* 1.88*   GFRESTIMATED 26* 26* 27* 28* 30*   < > 26* 24* 26*   GFRESTBLACK 31* 30* 31* 32* 35*   < > 31* 28* 30*   ANTOINETTE 9.6 9.8 9.5 9.5 9.3   < > 9.1 9.0 9.1   MAG  --   --   --   --  1.9  --  2.9* 1.5* 1.6    < > = values in this interval not displayed.     CBC  Recent Labs   Lab 05/03/19  0453 05/02/19  0517 05/01/19  0444 04/30/19  0658   WBC 6.7 6.2 5.5 5.4   RBC 4.04 4.07 4.10 3.98   HGB 11.5* 11.5* 11.5* 11.2*   HCT 38.2 36.8 36.7 36.4   MCV 95 90 90 92   MCH 28.5 28.3 28.0 28.1   MCHC 30.1* 31.3* 31.3* 30.8*   RDW 12.8 12.9 13.0 12.9   * 113* 114* 105*     INRNo lab results found in last 7 days.  Arterial Blood Gas  Recent Labs   Lab 04/30/19  1210 04/28/19  0142 04/27/19  2351 04/27/19  0924   O2PER 21.0 21.0 30.0 2L     ASSESSMENT/PLAN:   73 year old with a PMHx of CAD s/p PCI and CABG in 2018, DM2, HLD, CKD Stage III, COPD, and HFpEF. She is admitted with shortness of breath and weight gain. She is found to have pulmonary vascular congestion on a CXR. EDW close to or below 300 lbs, Patient 338 on admission.      HFpEF   Fluid Overload  Shortness of breath   Patient presents with progressively worsening SOB and orthopnea, weight gain of approximately 30 lbs, and edema in her lower extremities/abdomen. CXR remarkable for pulmonary edema. She has had admissions for HFpEF and fluid overload following her CABG in 2018. She has dietary indiscretion and non-compliance using bumex 2mg q48. No other obvious etiology for her shortness of breath. NO EKG changes and trop negative so low suspicion for ACS. No evidence of PNA such as leukocytosis or focal consolidation on CXR. A VQ scan was negative  for PE and the progress of her SOB has been insidious rather than abrupt.  Echo showed EF decrease to 35-40% from 40-45% with dilated IVC. She was given metolazone on admission for increased diuresis. She is net negative 13L.  Creatinine stable  - 2mg PO Bumex Q24 hours  - I/O, daily weights  - Salt restrict, fluid restrict   - BID BMP, lytes replacement   - Lymphedema consult   - PT/OT/CORE; recommending TCU      Confusion  Left Sided Weakness  Monoptic Diplopia  Onset noted around 10:30am. Stroke neurology called emergently.  Exam/findings consist with migraine in the setting of previous symptoms with negative imaging. Neurologic findings now stable x72 hours.  - continue to monitor     CAD status post CABG   HLD   HTN  Patient with history of multiple stents and CABG (LAD, PDA, OM) July of 2018, prolonged hospitalization due to weakness.   -Aspirin 81 mg daily   -Lipitor 40 mg daily   -BB: Metoprolol 50 mg BID (reduced from PTA given relative bradycardia)  -ACE: 25/50mg losartan daily  -Naicin      Hypercapnia - resolved  The patient had increased somnolence on 4/27 in AM and found to have increased CO2 on VBG to 68. After placement of BiPAP she had improvement in cognition.      DM II   - Medium sliding scale insulin   - hold PTA NPH/prandin on discharge per curbside consult from endocrinology.  Will request that TCU consider when/if these should be resumed     CKD III   Creat baseline 1.4-1.5. Slight increase of creatinine over the weekend, now improving. Creatinine today 1.87  - daily BMP with diuresis      BELEN   -CPAP at bedtime      Other chronic issues:   COPD - baseline  Mononeuronitis/Osteoarthritis  - continue PTA lyrica  Vitamin D deficiency - hold PTA supplementation  Hypothyroid - continue levothyroxine  Depression - continue lexapro, trazodone             Anticipated Disposition: pending TCU placement    Patient seen and discussed with Dr. Sultana Feliz, who agrees with above plan.    Brittany Fuentes,  BOBY  Cardiology - [facility]

## 2019-05-03 NOTE — PLAN OF CARE
"D:Patient admitted with SOB and volume overload.  I/A: SR/SB on monitor 50s-60s.  Patient ambulated in the hallway with her friend using cane.  Sliding scale coverage for BS.  Lymphedema wraps on BLE and c/o lower back pain and received PRN Tylenol with relieve.  Lidocaine patch placed on L-shoulder at 8pm and removed at 8am and using aqua-K pad while sleeping in bed.  Eat breakfast in chair with good appetite.  Up to bathroom with SBA and cane and voiding spontaneously.  Patient is now having her feet washed and attended by lymphedema team.  P: Patient awaiting TCU placement for now.    /51 (BP Location: Right arm)   Pulse 73   Temp 97.6  F (36.4  C) (Axillary)   Resp 18   Ht 1.689 m (5' 6.5\")   Wt 145.8 kg (321 lb 8 oz)   SpO2 98%   BMI 51.11 kg/m    "

## 2019-05-03 NOTE — PLAN OF CARE
Discharge Planner [place]  [facility]  [place] plan for discharge: [place]CU  Current status: Facilitated increased strength and activity tolerance with ADLs. Pt c/o of start to migraine prior to beginning therapy. Pt demonstrated TB dressing seated EOB with setup and SBA; however, pt required MIN A to thread R foot through pant leg due to difficulty seeing because of dim room lights for migraine. Pt completed functional mobility of ~120ft using 4WW and SBA-CGA, but activity done early due to migraine symptoms worsening.   Barriers to return to prior living situation: medical status, activity tolerance  Recommendations for discharge: Per plan established by the OT, the recommendation for dc location is TCU.  Rationale for recommendations: Pt will benefit from continued therapy to increase strength and activity tolerance for greater IND with I/ADLs.       Entered by: Evette Tello 05/03/2019 12:08 PM

## 2019-05-03 NOTE — DISCHARGE INSTRUCTIONS
1. Please begin taking 2mg oral Bumex, once daily  2. Please begin taking 40 mEq of oral Potassium, once daily with Bumex  3. In regards to your insulin, please hold your NPH as your blood glucoses have been so well controlled; that being said, we ask that you please see your endocrinology team as soon as possible to decide if your NPH should be resumed. You will need to call your clinic on Monday morning and ask for an appointment as soon as possible. If you are UNABLE to get into your endocrinology clinic, please contact your primary care provider to discuss your diabetes within 1 week of discharge.  4. Please take prandin 0.5mg three times daily, with meals  5. Please follow up in [facility] clinic 05/07/2019 with Starla Torrez NP

## 2019-05-03 NOTE — PLAN OF CARE
Discharge Planner PT  6C  Patient plan for discharge: Prefers home, open to rehab  Current status: SBA for sit<>stand from multiple surfaces, ambulates 150ft + 50ft + 200ft with 4WW and SBA, seated rest breaks taken 2/2 fatigue. Pt performs 15 continuous minutes on NuStep to promote aerobic endurance, workload of 1-3. VSS on RA throughout.  Barriers to return to prior living situation: Medical status, deconditioning, weakness  Recommendations for discharge: TCU  Rationale for recommendations: Pt would benefit from continued skilled therapy services to progress strength, endurance, balance, and safety and independence with functional mobility. If pt declines rehab stay, would recommend initial 24hr assist and home therapies.        Entered by: Pam Sosa 05/03/2019 3:46 PM

## 2019-05-03 NOTE — PLAN OF CARE
D. Pt is stable. AVSS. C/o chronic back pain and migraine. Tylenol was given for discomfort and pt reported good pain back pain relief.   I. Keeping a strict I and O's.   A. Pt is stable.   P.discharge to home with home care.

## 2019-05-03 NOTE — PROGRESS NOTES
Social Work Services Progress Note    Hospital Day: 8  Date of Initial Social Work Evaluation:  4/29/19  Collaborated with:  Pt, pt's roommate Odalis ([phone]), SNF admissions, Cards 1 team, RNCC    Data:  Pt is a 73 year old female being followed by SW for discharge planning to TCU.    Intervention:  SW received several denials for SNFs (see below) and pt was accepted to both [facility] and [facility].  Pt and Odalis are in agreement they do not want to admit to these facilities due to ratings and online reviews.  Pt's plan is to discharge to home and Odalis will be able to be a caregiver.  RNCC will set up homecare with [facility] at pt's request.  SW provided education that Medicare gives up to 30 days to go into a TCU from discharge.  Pt and Odalis are aware she may need PCP orders for consideration of admission.  Will ask RNCC to add RN, PT, OT, Lymphedema, and SW for supportive services.    Referrals  [facility] - accepted, pt and Odalis declined  PH: [phone]  F: [phone]    [facility] - accepted, pt and Odalis declined  PH: [phone]  F: [phone]    Assessment:   Pt and Odalis in agreement with the plan to discharge to home with homecare.    Plan:    Anticipated Disposition:  Facility:  TCU was documented by rehab team.  MD team does not feel pt is unsafe to go home.  Pt and Odalis discussed several times and decided home is preferred for discharge.    Barriers to d/c plan:  None, discharge this evening, Odalis to transport at 6:30 pm.    Follow Up:  RNCC following for discharge due to pt going home.    SAMANTHA Baptiste  6C Unit   Phone: [phone]  Pager: [phone]  Unit: [phone]    ___________________________________________________________________________________________________________________________________________________    Referrals Discontinued:  As of 5/3/19   - [facility] - no beds [facility] until next week   - [facility] - no beds available   - [facility] - no bariatric beds available until at least Monday, pending weekend discharges   - [facility] - no bariatric beds available   - [facility] - no bariatric beds available   - [facility] - no beds available   - [facility] - No beds available   - [facility] - No beds available   - [facility] - No beds available   - [facility] - No beds available   - [facility] - No beds available   - [facility] - No beds available   - [facility] - No beds available   - [facility] - No beds available

## 2019-05-03 NOTE — PLAN OF CARE
Edema 6C: Patient with chronic B LE edema and skin intact. Reapplication of GCB from MTPs to knee creases for further edema management and increased ease with functional mobility. Remove wraps if causing pain/numbness or become soiled.

## 2019-05-03 NOTE — PLAN OF CARE
"D: HF exacerbation and pulmonary edema  Hx: CAD s/p CABG, T2DM, HLD, CKD, COPD, L rotator cuff repair.     I: Monitored vitals and assessed pt status.   Changed:  -Bumex changed from IV to 2g PO daily    -Pt continues to be bradycardic over NOC. SR 60s-80s while awake.     -No acute neurological changes     - R groin wound/rash cleansed with soap and water, dried well. Interdry replaced per WOC order.     -L 1st toe has dressing (primapore) on d/t ingrown toe nail. CDI. No drainage. Dressing to prevent rubbing/irritation from other toe.     Running: LPIV SL     PRN: tylenol 975 mg x2; aqua K pad; K replaced (3.2 AM) Recheck this evening 3.9 replaced with 20 mEq. Recheck in AM.     A: A0x4. VSS on RA. CPAP at [facility]. SR while awake; SB when sleeping. Afebrile. Urinating adequately.   Assist of 1 with cane and gaitbelt  Vital signs:  Temp: 97.7  F (36.5  C) Temp src: Oral BP: 109/57   Heart Rate: 65 Resp: 16 SpO2: 96 % O2 Device: None (Room air) Oxygen Delivery: 2 LPM Height: 168.9 cm (5' 6.5\") Weight: 145.4 kg (320 lb 8 oz)  Estimated body mass index is 50.96 kg/m  as calculated from the following:    Height as of this encounter: 1.689 m (5' 6.5\").    Weight as of this encounter: 145.4 kg (320 lb 8 oz).        P: Continue to monitor Pt status and report changes to Cards I team. Discharge pending TCU placement  "

## 2019-05-03 NOTE — PROGRESS NOTES
CLINICAL NUTRITION SERVICES - REASSESSMENT NOTE     Nutrition Prescription    RECOMMENDATIONS FOR MDs/PROVIDERS TO ORDER:  Encourage adequate protein intake.    Malnutrition Status:    Patient does not meet two of the above criteria necessary for diagnosing malnutrition but is at risk for malnutrition    Recommendations already ordered by Registered Dietitian (RD):  -Trialing Gelatein 20 with dinner to help increase protein intake.    Future/Additional Recommendations:  1. Check if likes Gelatein 20 supplement (ordered to trial to increase protein intake)  2. Monitor skin integrity.  If pressure injury staged at 2 or greater, recommend:  -1 tablet Thera-Vit-M daily  -500 mg Vitamin C daily x 10 days  -10,000 international unit(s) vitamin A x 10 days  -220 mg Zinc Sulfate daily x 10 days (do not exceed zinc supplementation >6 wks consecutively)       EVALUATION OF THE PROGRESS TOWARD GOALS   Diet: 2 g Na + 2000 mL FR + No caffeine diet for 24 hrs    Intake: Eating % meals per RN flowsheets. Ordering 2-3 meals daily per [employer] meal ordering system; 3 day average intake 1732 kcal and 77 g protein (assuming 100% meals consumed) --> meets 100% minimum assessed kcal and ~90% minimum assessed protein needs.       NEW FINDINGS   -General: Discharge is pending diuresis.    -GI: Last BM on 5/2 per RN flowsheets    -Renal: CKD stage 3; BUN 68 (H, uptrended), Cr 1.86 (H, uptrended)    -Skin: Per RN flowsheets, R medial groin wound noted on 4/30 (question of if surgical opening). Per WOC RN note on 5/1, Right abdominal fold wound due to Intertrigo with fungal infection noted (not staged PI).    -Meds: Folic acid, niacin, KCl, senna-docusate, insulin (medium insulin resistance), PRN bowel meds    -Endocrine: BGM 160s-230s over past 24 hrs    -Wt: Wt overall downtrended over past week (151.1 kg on 4/26-->145.8 kg on 5/3). Likely mostly fluid-related loss as pt is undergoing diuresis (noted to be +30 lbs fluid upon  admission).     NEW Dosing Weight:  79 kg (adjusted, based on estimated dry wt of ~136 kg (300 lbs) per Cards note from 5/2 and IBW of 60.3 kg)     Updated ASSESSED NUTRITION NEEDS  Estimated Energy Needs: 7637-2225 kcals/day (20 - 25 kcals/kg)  Justification: Obese  Estimated Protein Needs:  grams protein/day (1.1 - 1.4 grams of pro/kg)  Justification: Increased needs 2/2 HF  Estimated Fluid Needs: 1 mL/kcal   Justification: Maintenance, or Per provider pending fluid status    MALNUTRITION  % Intake: Decreased intake does not meet criteria  % Weight Loss: Weight loss does not meet criteria (wt loss likely in setting of diuresis, pt noted to be +30 lbs upon admission)  Subcutaneous Fat Loss: None observed - per brief visual, pt busy x2 attempts  Muscle Loss: None observed - per brief visual, pt busy x2 attempts  Fluid Accumulation/Edema: Moderate (3+ BLE edema and anasarca per RN flowsheets - MD notes indicate pt remains significantly edematous)  Malnutrition Diagnosis: Patient does not meet two of the above criteria necessary for diagnosing malnutrition but is at risk for malnutrition    Previous Goals   1. Patient to consume % of nutritionally adequate meal trays TID, or the equivalent with supplements/snacks.  Evaluation: Nearly meeting - Meeting 100% minimum assessed kcal needs, and meeting ~90% minimum protein needs  2. Pt to verbalize 2 ways to reduce sodium in diet  Evaluation: Unable to evaluate    Previous Nutrition Diagnosis  Food- and nutrition-related knowledge deficit related to low sodium diet as evidenced by pt reports not having formal low sodium diet education in the past.    Evaluation: Unable to assess d/t pt busy    CURRENT NUTRITION DIAGNOSIS  Inadequate protein intake related to increased needs in setting of HF as evidenced by 3-day average showing 77 g protein daily (~90% minimum assessed protein needs) and assessed protein needs of  grams protein/day (1.1 - 1.4 grams of  pro/kg) per meal ordering records.     INTERVENTIONS  Implementation  -Nutrition education: Unable d/t pt busy with RN on one attempt and on the phone during another.    -Medical food supplement: Gelatein 20 to trial     Goals  Patient to consume % of nutritionally adequate meal trays TID, or the equivalent with supplements/snacks.    Monitoring/Evaluation  Progress toward goals will be monitored and evaluated per protocol.    Joy Dockery RD, LD  Pager: [phone]

## 2019-05-03 NOTE — PROGRESS NOTES
Care Coordinator - Discharge Planning    Admission Date/Time:  4/25/2019  Attending MD:  MARIELENA Lu MD     Data  Chart reviewed, discussed with interdisciplinary team.   Patient was admitted for:   1. (HFpEF) heart failure with preserved ejection fraction (H)    2. Pulmonary edema with congestive heart failure (H)    3. Chronic diastolic heart failure (H)    4. Chronic obstructive pulmonary disease, unspecified COPD type (H)    5. Atherosclerosis of native coronary artery without angina pectoris, unspecified whether native or transplanted heart    6. Recurrent major depressive disorder, in partial remission (H)    7. Pain in joint of left shoulder    8. S/P CABG x 3    9. Acute post-operative pain    10. Allergic reaction, subsequent encounter    11. Iron deficiency anemia secondary to inadequate dietary iron intake    12. Other specified hypothyroidism    13. Insomnia, unspecified type         Assessment   Concerns with insurance coverage for discharge needs: None.  Current Living Situation: Patient lives with her roommate Odalis  Support System: Supportive and Involved  Services Involved: [facility]  Transportation at Discharge: Family or friend will provide  Transportation to Medical Appointments: Roommate, Odalis    Coordination of Care and Referrals: This writer received an update from MARCO that pt has now decided she wants to discharge to home with home care and is declining TCU placement. MARCO discussed this with pt's roommate Odalis via telephone and she is in agreement with this plan. Odalis would like to have home care arranged through [facility]. This writer spoke with intake at [facility] who stated can start care on Sunday 5/5.     Intervention:  Referral made to [facility] (Ph: [phone], F: [phone]) for RN evaluation post hospitalization. Assess vital signs, respiratory and cardiac status, activity tolerance, hydration, nutritional status, med setup and  management. PT/OT eval and treat for deconditioning strengthening and endurance. Lymphedema services to eval and treat. SW to assist with community resources.     Plan  Anticipated Discharge Date: 5/3/2019  Anticipated Discharge Plan: Discharge to home with home care    CTS Handoff completed:  YES  Prerna Gurrola RN BSN  6C Unit Care Coordinator  Phone number: [phone]  Pager: [phone]

## 2019-05-05 ENCOUNTER — PATIENT OUTREACH (OUTPATIENT)
Dept: CARE COORDINATION | Facility: CLINIC | Age: 74
End: 2019-05-05

## 2019-05-05 NOTE — PROGRESS NOTES
Date: 5/7/2019 Status: Anna Marie   Time: 3:00 PM Length: 30   Visit Type: UMP CORE RETURN [[id number]] CHINTAN: [id number]   Provider: Starla Saez NP Department:  [facility]     Patient was contacted by an RN for post DC follow up so no duplicate post DC follow up call will be made

## 2019-05-06 ENCOUNTER — PATIENT OUTREACH (OUTPATIENT)
Dept: CARE COORDINATION | Facility: CLINIC | Age: 74
End: 2019-05-06

## 2019-05-06 ENCOUNTER — TELEPHONE (OUTPATIENT)
Dept: ENDOCRINOLOGY | Facility: CLINIC | Age: 74
End: 2019-05-06

## 2019-05-06 DIAGNOSIS — I50.32 CHRONIC DIASTOLIC HEART FAILURE (H): Primary | ICD-10-CM

## 2019-05-06 ASSESSMENT — ACTIVITIES OF DAILY LIVING (ADL): DEPENDENT_IADLS:: INDEPENDENT

## 2019-05-06 NOTE — PLAN OF CARE
PT / 6C -     Physical Therapy Discharge Summary    Reason for therapy discharge:    Discharged to home with home therapy.    Progress towards therapy goal(s). See goals on Care Plan in [facility] electronic health record for goal details.  Goals not met.  Barriers to achieving goals:   discharge from facility.    Therapy recommendation(s):    Continued therapy is recommended.  Rationale/Recommendations:  increase overall strength to improve safety and independence with all functional mobility.

## 2019-05-06 NOTE — PROGRESS NOTES
Clinic Care Coordination Contact    Clinic Care Coordination Contact  OUTREACH    Referral Information:  Referral Source: ED Follow-Up    Primary Diagnosis: Neurological Disorders(weakness low blood sugar )    Chief Complaint   Patient presents with     Clinic Care Coordination - Post Hospital     Clinic Care Coordination RN         [place] Utilization: [facility]- 4/25-5/3/2019-  Discharge Diagnoses       Acute on Chronic diastolic heart failure (H)    Coronary atherosclerosis    Hypothyroidism    Migraine    Chronic airway obstruction/ COPD    Obstructive sleep apnea    Hypertension goal BP (blood pressure) < 130/80    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)    Hyperlipidemia with target LDL less than 70    Transient cerebral ischemia    S/P CABG x 3    Type 2 diabetes mellitus with stage 3 chronic kidney disease, with long-term current use of insulin (H)    Lymphedema        Clinic Utilization  Difficulty keeping appointments:: No  Compliance Concerns: No  No PCP office visit in Past Year: No  Utilization    Last refreshed: 5/5/2019 11:03 AM:  Hospital Admissions 5           Last refreshed: 5/5/2019 11:03 AM:  ED Visits 4           Last refreshed: 5/5/2019 11:03 AM:  No Show Count (past year) 7              Current as of: 5/5/2019 11:03 AM              Clinical Concerns:  Current Medical Concerns:  Patients blood sugar this morning was 226 and so she took her NPH insulin.  Patient will call Endocrinology clinic today to make an appointment in 1-2 days .  Patient has a 2.5 # weight gain today and a slight increase in SOB but denies ankle edema     Pain  Pain (GOAL):: Yes  Location of chronic pain:: Back Fibermyalgia  Progression: Unchanged  Health Maintenance Reviewed:    Clinical Pathway: Clinic Care Coordination CHF Assessment    Discharge:    Day of hospital discharge: 4/25/19  What recommendations were made for follow up after your recent hospitalization? Bumex and Potassium started  "  Have the follow up appointments been scheduled? Yes  If not, can I help you set up these appointments? No  Transportation concerns (GOAL):: No    CHF:    Home scale available:  Yes  Home scale weight this mornin# yesterday 317.5  Weight gain of 2.5 #   Hospital discharge weight: 320.00   Heart Failure Zones sheet on refrigerator or available: Yes  Any increased SOB since hospital discharge:  Yes  Any increased edema since hospital discharge:  No  What number to call for YELLOW zones: [phone]    Symptom Review:   Heart Failure Symptoms  Shortness of breath:: Yes  Shortness of breath symptom course:: Stable  Waking up at night short of breath?: No  Trouble walking or talking while short of breath?: Yes  Wheezing or noisy breathing?: No  Cough: No  Increased sputum: No  Fever: No  Chest pain: : No  Checking weight daily? : Yes  Weight?: Gain  Today's Weight?: 100 kg (220 lb 6 oz)  Weight increase more than 2 lbs in 24 hours?: Yes  Weight Increase more than 5 lbs in 1 week? : Yes  Does the patient have understanding of Diuretic self-management?: Yes  Bloating:: None  Urination:: Normal  Fatigue: Yes  Weakness (Heaviness in limbs):: No  What Heart Failure zone are you currently in?: Yellow  Overall your CHF symptoms are (GOAL):: Worsening    Medications:  \"How many new medications are you on since your hospitalization?\"  0 - 1  \"How many of your current medicines changed (dose, timing, name, etc.) while you were in the hospital?\"  0 - 1  \"Do you have questions about your medications?\"  No  For patients on insulin: \"Did you start on insulin in the hospital or did you have your insulin dose changed?\"  NPH stopped   Is patient on Warfarin?  No  Is Ejection Fraction <40%: Yes:   Patient on ARB and one of the following beta blockers: Metoprolol succinate    When is the first appointment with the CHF clinic: tomorrow                Medication Management:  Reviewed     Functional Status:  Dependent IADLs:: " Independent  Bed or wheelchair confined:: No    Living Situation:       Diet/Exercise/Sleep:  Food Insecurity: No  Tube Feeding: No  Exercise:: Unable to exercise  Inadequate activity/exercise (GOAL):: No  Significant changes in sleep pattern (GOAL): No    Transportation:  Transportation concerns (GOAL):: No  Transportation means:: Family     Psychosocial:        Financial/Insurance:   Financial/Insurance concerns (GOAL):: No     Supplies used at home:: Nebulizer tubing       Advance Care Plan/Directive  Type Advanced Care Plans/Directives: Advanced Directive - On File          Goals:   Goals        General    # 3 Medical (pt-stated)     Notes - Note edited  5/6/2019  9:32 AM by Senia Durán RN    Goal Statement:I will prevent a future fall   Measure of Success: No falls  Supportive Steps to Achieve:  I will use cane or walker at all times   I will use lifeline when I go to the bathroom because walker doesn't fit   Barriers:Lightheaded /Dizzy leg weakness   Strengths: outpatient PT/OT 3/6  Date to Achieve By: 6/1/2019  Patient expressed understanding of goal: Yes        #1 Medical (pt-stated)     Notes - Note edited  5/6/2019  9:32 AM by Senia Durán RN    Goal Statement: I will monitor shortness of breath episodes   Measure of Success: More energy for daily activity   Supportive Steps to Achieve:   I will seek medical help if experiencing increased shortness of breath episodes   I will follow  COPD Action Plan   Barriers: None identified   Strengths: supportive room mate   Date to Achieve By 6/1/2019  Patient expressed understanding of goal: Yes               Patient/Caregiver understanding: Expresses good understanding of discharge     Outreach Frequency: weekly  Future Appointments              Tomorrow  LAB  Health Lab, [facility]    Tomorrow Starla Saez NP [facility] Heart Care, [facility]    In 2 weeks Bharat Fitzgerald DPM [facility], [facility]    In 1 month  LAB  Health Lab,  [facility]    In 1 month Stephanie Wolf MD [facility] Heart Care, [facility]    In 3 months Odalis Medley PA-C [facility] Endocrinology, [facility]          Plan:   Patient will keep Cardiology appointment tomorrow   Patient will call Endocrinology clinic and get in in the next 1-2 days  Patient will call back and make PCP appointment if unable to get into Endocrinology clinic   Patient will continue to test blood sugars 4 times a day and pace activity to reserve her energy   CC will follow up in 3-5 business days     Senia Durán RN, Care Coordinator   [facility] -Care Coordination  Doug Christine

## 2019-05-06 NOTE — PLAN OF CARE
Occupational Therapy Discharge Summary    Reason for therapy discharge:    Discharged to home with home therapy.    Progress towards therapy goal(s). See goals on Care Plan in [facility] electronic health record for goal details.  Goals partially met.  Barriers to achieving goals:   discharge from facility.    Therapy recommendation(s):    Continued therapy is recommended.  Rationale/Recommendations:  Pt would benefit from further therapy to increase safety and independence with mobility and ADLs within home environment.

## 2019-05-06 NOTE — TELEPHONE ENCOUNTER
[facility] Call Center    Phone Message    May a detailed message be left on voicemail: yes    Reason for Call: Other: Pt is needng to schedule a hosptial follow up with in 2 days with Dr. Leah Medley for Heart failure preserved rejection fraction per pt, pt hasn't had insulin since last night. Please call the pt back ASAP to schdule her Hosptial Follow up, thank youo     Action Taken: Message routed to:  [facility] ([facility] Isela

## 2019-05-06 NOTE — PROGRESS NOTES
Dr Montoya's advice is as follows --Can take an additional 1mg bumex today, or could call cardiology office for their recommendation.  Patient instructed of this advice  Patient has a Bumex 2 mg tablet and CC spoke to the discharge pharmacist and she states the patient can take 1/2 tablet     Senia Durán RN, Care Coordinator   [facility] -Care Coordination  Doug Christine

## 2019-05-07 ENCOUNTER — OFFICE VISIT (OUTPATIENT)
Dept: ENDOCRINOLOGY | Facility: CLINIC | Age: 74
End: 2019-05-07
Payer: MEDICARE

## 2019-05-07 ENCOUNTER — OFFICE VISIT (OUTPATIENT)
Dept: CARDIOLOGY | Facility: CLINIC | Age: 74
End: 2019-05-07
Attending: NURSE PRACTITIONER
Payer: MEDICARE

## 2019-05-07 VITALS
WEIGHT: 293 LBS | DIASTOLIC BLOOD PRESSURE: 61 MMHG | SYSTOLIC BLOOD PRESSURE: 114 MMHG | HEIGHT: 66 IN | BODY MASS INDEX: 47.09 KG/M2 | OXYGEN SATURATION: 96 % | HEART RATE: 63 BPM

## 2019-05-07 VITALS
DIASTOLIC BLOOD PRESSURE: 70 MMHG | HEART RATE: 75 BPM | WEIGHT: 293 LBS | BODY MASS INDEX: 50.78 KG/M2 | SYSTOLIC BLOOD PRESSURE: 150 MMHG

## 2019-05-07 DIAGNOSIS — I50.32 CHRONIC DIASTOLIC HEART FAILURE (H): Chronic | ICD-10-CM

## 2019-05-07 DIAGNOSIS — Z79.4 TYPE 2 DIABETES MELLITUS WITH STAGE 3 CHRONIC KIDNEY DISEASE, WITH LONG-TERM CURRENT USE OF INSULIN (H): Primary | ICD-10-CM

## 2019-05-07 DIAGNOSIS — N18.30 TYPE 2 DIABETES MELLITUS WITH STAGE 3 CHRONIC KIDNEY DISEASE, WITH LONG-TERM CURRENT USE OF INSULIN (H): Primary | ICD-10-CM

## 2019-05-07 DIAGNOSIS — E11.22 TYPE 2 DIABETES MELLITUS WITH STAGE 3 CHRONIC KIDNEY DISEASE, WITH LONG-TERM CURRENT USE OF INSULIN (H): Primary | ICD-10-CM

## 2019-05-07 DIAGNOSIS — Z59.71 INSURANCE COVERAGE PROBLEMS: ICD-10-CM

## 2019-05-07 LAB
ANION GAP SERPL CALCULATED.3IONS-SCNC: 8 MMOL/L (ref 3–14)
BUN SERPL-MCNC: 71 MG/DL (ref 7–30)
CALCIUM SERPL-MCNC: 9.5 MG/DL (ref 8.5–10.1)
CHLORIDE SERPL-SCNC: 100 MMOL/L (ref 94–109)
CO2 SERPL-SCNC: 27 MMOL/L (ref 20–32)
CREAT SERPL-MCNC: 1.92 MG/DL (ref 0.52–1.04)
GFR SERPL CREATININE-BSD FRML MDRD: 25 ML/MIN/{1.73_M2}
GLUCOSE SERPL-MCNC: 133 MG/DL (ref 70–99)
POTASSIUM SERPL-SCNC: 3.6 MMOL/L (ref 3.4–5.3)
SODIUM SERPL-SCNC: 136 MMOL/L (ref 133–144)

## 2019-05-07 PROCEDURE — 99214 OFFICE O/P EST MOD 30 MIN: CPT | Mod: ZP | Performed by: NURSE PRACTITIONER

## 2019-05-07 PROCEDURE — 80048 BASIC METABOLIC PNL TOTAL CA: CPT | Performed by: NURSE PRACTITIONER

## 2019-05-07 PROCEDURE — 36415 COLL VENOUS BLD VENIPUNCTURE: CPT | Performed by: NURSE PRACTITIONER

## 2019-05-07 PROCEDURE — G0463 HOSPITAL OUTPT CLINIC VISIT: HCPCS | Mod: ZF

## 2019-05-07 RX ORDER — POTASSIUM CHLORIDE 750 MG/1
20 TABLET, EXTENDED RELEASE ORAL 3 TIMES DAILY
Qty: 180 TABLET | Refills: 3 | Status: SHIPPED | OUTPATIENT
Start: 2019-05-07 | End: 2019-07-31

## 2019-05-07 RX ORDER — INSULIN GLARGINE 100 [IU]/ML
20 INJECTION, SOLUTION SUBCUTANEOUS DAILY
Qty: 15 ML | Refills: 1 | Status: SHIPPED | OUTPATIENT
Start: 2019-05-07 | End: 2019-05-07

## 2019-05-07 RX ORDER — BUMETANIDE 1 MG/1
1-2 TABLET ORAL DAILY
Qty: 90 TABLET | Refills: 3 | Status: SHIPPED | OUTPATIENT
Start: 2019-05-07 | End: 2019-05-22

## 2019-05-07 ASSESSMENT — MIFFLIN-ST. JEOR: SCORE: 1975.54

## 2019-05-07 ASSESSMENT — PAIN SCALES - GENERAL
PAINLEVEL: NO PAIN (0)
PAINLEVEL: EXTREME PAIN (8)

## 2019-05-07 NOTE — LETTER
5/7/2019       RE: Mariel Ribeiro  [address] [place] [address]     Dear Colleague,    Thank you for referring your patient, Mariel Ribeiro, to the [facility] ENDOCRINOLOGY at [facility]. Please see a copy of my visit note below.    [facility]  Endocrinology  Muriel Will PA-C  05/07/2019      Chief Complaint:   Diabetes    History of Present Illness:   Mariel Ribeiro is a 73 year old female with a history of type  2 diabetes,obesity, COPD, CAD with history of stent placement, CKD stage III, hyperlipidemia, fibromyalgia, anxiety, depression, lymphedema, S/P CABG on 07/ 02/18  who presents with Odalis for evaluation of hospital follow up. She was diagnosed with type 2 diabetes mellitus in 2007. Initially managed with oral Metformin, Januvia, and Glimepiride, all have been discontinued due to declining renal fucntion. She was well managed with NPH 28 units bid and Prandin with meals when seen earlier this year by Dr. Wells.      Mariel presented to the ED 4/25/2019 with weight gain and shortness of breath. In the past Mariel has been admitted for acute on chronic heart failure with preserved ejection fraction. She was noted to have an ejection fraction of 23-40%, down from baseline of 40-45%. Mariel was maintained on Bumex this admission and received 1 additional dose of metolazone 05/01/2019.  She was diuresed a total of 13L and 8kg.  She was discharged to home on 2mg oral Bumex once daily, daily weights and follow-up in [facility].    NPH and Prandin were held on admission. Inpatient diabetes care used a sliding scale which kept her within normal limits ( with most 100-160) On dishcarge she was instructed to hold NPH , and continue 0.5 mg Prandin with meals. Creatinin was elevated above baseline of 1.4-1.5, 1.86 at time of discharge. GFR was also declined to 28-32 during hospitalization down from 39 - 42 earlier this year.  4/30/19 A1c 6.5%, hemoglobin at the  "time was slightly low at 11.2.. Since discharge she has felt \"barely fair\". She is not on any insulin since discharge, but continues on 0.5mg Prandin before meals.     Mariel is here today with long time friend and now housemate for >2 decades, Odalis.  While in the hospital she was eating more than she usually does. Her blood glucose has been high since discharge with one over 300 since 5/3 and other readings in 200s. Her vision has been blurred. Last night her BG was 105 at bedtime, she had eaten about 2 hours prior to this. 5/5 she felt ill before eating. She checks 4 times per day. Mariel has occasional lows, she had a low before cardiac rehab. Because of this low she had to eat, her NPH makes her have to eat to accommodate for lows. She has weight gain and is a \"cycle\" of eating to avoid lows with NPH. Per her  Odalis she sometimes forgets to eat so they find having to eat for NPH helpful. Her normal insulin is NPH, which she is on because her insurance was not covering other long acting they think, nor Novolog. Odalis is concerned about Mariel's overall health because she recently was discharged and is taking Bumex for heart failure. In addition Odalis says that Mariel's blood glucose will get into the 40s, which Mariel does not recall being below 70, but notes indeed her memory is poor. To correct lows she takes 3 mints or drinks ginger ale. Mariel reports a loss of consciousness due to hypoglycemia while at rehab, Odalis states that has never happened at home or to her knowledge, they have not had difficulty treating lows with oral intake.     Feeling poor since discharge with mood swings.     Blood Glucose Monitoring:  No glucometer today. Reports blood glucose >200 most days since discharge, range 105 - 300+.    Diabetes monitoring and complications:  CAD: Yes: coronary artery disease, CABG, cerebrovascular disease   Last eye exam results: 12/12/2018  Microalbuminuria: 23.69 March 2018  HTN: Yes: 100 mg " Metoprolol, 75 mg Losartan, and Bumex  On Statin: Yes  On Aspirin: No  Depression: Yes    Review of Systems:   Pertinent items are noted in HPI.  All other systems are negative.    Active Medications:     Current Outpatient Medications:      insulin glargine (BASAGLAR KWIKPEN) 100 UNIT/ML pen, Inject 20 Units Subcutaneous daily Call office if fasting BG >180., Disp: 15 mL, Rfl: 1     acetaminophen (TYLENOL) 325 MG tablet, Take 2 tablets (650 mg) by mouth every 4 hours as needed for mild pain, Disp: 100 tablet, Rfl:      albuterol (PROAIR HFA) 108 (90 Base) MCG/ACT inhaler, Inhale 1-2 puffs into the lungs every 4 hours as needed for wheezing, Disp: 8.5 g, Rfl:      aspirin (ASA) 81 MG EC tablet, Take 1 tablet (81 mg) by mouth daily, Disp: , Rfl:      atorvastatin (LIPITOR) 40 MG tablet, Take 1 tablet (40 mg) by mouth daily, Disp: 30 tablet, Rfl: 0     blood glucose (NO BRAND SPECIFIED) test strip, 1 strip by In Vitro route 4 times daily Strips per patient's preference, Disp: 400 each, Rfl: 1     blood glucose monitoring (NO BRAND SPECIFIED) meter device kit, Use to test blood sugar four times daily or as directed., Disp: 1 kit, Rfl: 0     bumetanide (BUMEX) 2 MG tablet, Take 1 tablet (2 mg) by mouth daily, Disp: 45 tablet, Rfl: 3     EPINEPHrine (EPIPEN/ADRENACLICK/OR ANY BX GENERIC EQUIV) 0.3 MG/0.3ML injection 2-pack, Inject 0.3 mLs (0.3 mg) into the muscle once as needed for anaphylaxis, Disp: 0.6 mL, Rfl: 0     escitalopram (LEXAPRO) 20 MG tablet, Take 1 tablet (20 mg) by mouth every morning, Disp: 30 tablet, Rfl: 0     ferrous gluconate (FERGON) 324 (38 Fe) MG tablet, Take 1 tablet (324 mg) by mouth Every Mon, Wed, Fri Morning, Disp: 36 tablet, Rfl: 3     folic acid (FOLVITE) 1 MG tablet, Take 2 tablets (2 mg) by mouth daily, Disp: , Rfl:      guaiFENesin (MUCINEX) 600 MG 12 hr tablet, Take 1 tablet (600 mg) by mouth 2 times daily, Disp: , Rfl:      levothyroxine (SYNTHROID/LEVOTHROID) 150 MCG tablet, Take 1  tablet (150 mcg) by mouth daily, Disp: 30 tablet, Rfl: 0     [facility] OrthopedicsCAN FINEPOINT LANCETS MISC, Use to test blood sugars 2 times daily or as directed., Disp: 100 each, Rfl: prn     losartan (COZAAR) 50 MG tablet, Take 1 tablet (50 mg) by mouth every morning AND 0.5 tablets (25 mg) every evening., Disp: 135 tablet, Rfl: 3     metoprolol tartrate (LOPRESSOR) 50 MG tablet, Take 1 tablet (50 mg) by mouth 2 times daily, Disp: 60 tablet, Rfl: 3     niacin ER (NIASPAN) 500 MG CR tablet, Take 1 tablet (500 mg) by mouth daily, Disp: , Rfl: 9     nitroGLYcerin (NITROSTAT) 0.4 MG sublingual tablet, For chest pain place 1 tablet under the tongue every 5 minutes for 3 doses. If symptoms persist 5 minutes after 1st dose call 911., Disp: 25 tablet, Rfl: 0     ONE TOUCH ULTRA (DEVICES) MISC, test blood sugar BID, Disp: 1, Rfl: 0     potassium chloride ER (K-DUR/KLOR-CON M) 10 MEQ CR tablet, Take 4 tablets (40 mEq) by mouth daily, Disp: 120 tablet, Rfl: 3     pregabalin (LYRICA) 100 MG capsule, Take 1 capsule (100 mg) by mouth 2 times daily, Disp: 180 capsule, Rfl: 3     repaglinide (PRANDIN) 0.5 MG tablet, Take 1 tablet (0.5 mg) by mouth 3 times daily (before meals), Disp: 90 tablet, Rfl: 3     senna-docusate (SENOKOT-S/PERICOLACE) 8.6-50 MG tablet, Take 1-2 tablets by mouth 2 times daily as needed for constipation, Disp: 30 tablet, Rfl: 0     traZODone (DESYREL) 50 MG tablet, TAKE 3 TABLETS(150 MG) BY MOUTH AT BEDTIME, Disp: 270 tablet, Rfl: 1     vitamin D3 (CHOLECALCIFEROL) 64107 units (250 mcg) capsule, Take 1 capsule (10,000 Units) by mouth daily, Disp: , Rfl:       Allergies:   Contrast dye; Fish allergy; Iodine; Oxycodone; Tree nuts [nuts]; Albuterol; Bactrim; Betadine [povidone iodine]; Combivent; Dulaglutide; Fish; Lisinopril; Penicillins; Allopurinol; Latex; and Wool fiber      Past Medical History:  Anxiety  Concussion   COPD  Coronary artery disease  Depression  GERD (gastroesophageal reflux disease)  Gout  Thyroid  "cancer  Migraines  Lymphedema  Mononeuritis  Obstructive sleep apnea  Hyperlipidemia  Renal failure  Type 2 diabetes mellitus  Hypothyroidism  Migraine  Vitamin D deficiency  Chronic diastolic heart failure  Osteoarthritis  TIA  Fatty liver  Hepatomegaly  Carotid artery disease  Spinal stenosis     Past Surgical History:  Angiography  Knee arthroplasty  CABG  Bilateral TKA  Cataract  Colonoscopy  Dilatation and curettage  Angiocardiogram  Knee arthroscopy  Cholecystectomy   Dentures  Thyroidectomy  Right thumb surgery  Right breast biopsy  Left rotator cuff surgery  Left thumb surgery    Family History:   Mother: coronary artery disease, diabetes mellitus, hypertension, DVT, carotid artery stenosis, glaucoma, macular degeneration  Father: coronary artery disease, hypertension, cerebrovascular disease, alcoholism  Brother: cancer, hypertension, coronary artery disease   Sister: cardiovascular, hypertension, coronary artery disease, diabetes mellitus     Social History:   Unmarried  Former smoker  Her eating is sporadic.  She often is not hungry or is at cardiac rehabilitation midday.  She does skip meals if not hungry.       Physical Exam:   /70   Pulse 75   Wt 144.9 kg (319 lb 6.4 oz)   BMI 50.78 kg/m        Wt Readings from Last 10 Encounters:   05/07/19 144.9 kg (319 lb 6.4 oz)   05/03/19 145.8 kg (321 lb 8 oz)   04/09/19 149.7 kg (330 lb)   03/13/19 146.1 kg (322 lb)   02/13/19 141.5 kg (312 lb)   02/04/19 (P) 141.9 kg (312 lb 12.8 oz)   01/28/19 137.9 kg (304 lb)   12/24/18 137.9 kg (304 lb)   12/11/18 135.6 kg (299 lb)   11/29/18 137.9 kg (304 lb 1.6 oz)        General: Pleasant, well nourished and hydrated female in NAD.   Psych:  Mood is \"good,\" affect is appropriate.  Thought form and content are fluid and coherent.    HEENT: Eyes and sclera are clear. Extraocular movements are grossly intact without proptosis.  Nares are patent, mucous membranes moist.  Neck: No masses or JVD are noted.    Resp: " Easy and unlabored breathing.   Neuro: Alert and oriented, communicating clearly.   Ext: no swelling or edema      Data:  Lab Results   Component Value Date     05/03/2019    POTASSIUM 3.4 05/03/2019    CHLORIDE 99 05/03/2019    CO2 32 05/03/2019    ANIONGAP 6 05/03/2019     (H) 05/03/2019    BUN 68 (H) 05/03/2019    CR 1.86 (H) 05/03/2019    ANTOINETTE 9.6 05/03/2019     Lab Results   Component Value Date    GFRESTIMATED 26 (L) 05/03/2019    GFRESTIMATED 26 (L) 05/02/2019    GFRESTIMATED 27 (L) 05/02/2019    GFRESTBLACK 31 (L) 05/03/2019    GFRESTBLACK 30 (L) 05/02/2019    GFRESTBLACK 31 (L) 05/02/2019      Lab Results   Component Value Date    MICROL 13 03/19/2018    UMALCR 26.39 (H) 03/19/2018        Lab Results   Component Value Date    A1C 6.5 (H) 04/30/2019    A1C 5.2 09/10/2018    A1C 9.3 (H) 07/01/2018    A1C 8.2 (H) 03/19/2018    A1C 7.3 (H) 08/09/2017    HEMOGLOBINA1 6.5 (A) 04/03/2019    HEMOGLOBINA1 5.5 11/29/2018     Lab Results   Component Value Date    CHOL 170 03/13/2019    CHOL 203 (H) 03/19/2018    TRIG 118 03/13/2019    TRIG 212 (H) 03/19/2018    HDL 71 03/13/2019    HDL 50 03/19/2018    LDL 76 03/13/2019     (H) 03/19/2018    NHDL 99 03/13/2019    NHDL 153 (H) 03/19/2018     TSH   Date Value Ref Range Status   08/28/2018 2.97 0.40 - 4.00 mU/L Final   03/19/2018 1.15 0.40 - 4.00 mU/L Final   11/15/2017 0.49 0.40 - 4.00 mU/L Final   08/23/2017 0.04 (L) 0.40 - 4.00 mU/L Final   05/18/2017 0.25 (L) 0.40 - 4.00 mU/L Final       Assessment and Plan:     Type 2 diabetes mellitus with stage 3 chronic kidney disease, with long-term current use of insulin (H)  Type 2 diabetes mellitus with marginal blood glucose control recently with high risk of further complications from hyper and hypoglycemia. From a cardiac standpoint she would benefit from starting a GLP agonist or SGLT2i, however renal function is compromised at this point in time. GLP-1 could be considered with caution.  Her A1c was 6.5  most recently, consistent with excessive hypoglycemia for her frail health.     Together we reviewed long acting insulin options, it would be best to change away from NPH so she does not have to eat to prevent lows and to prevent lows and falls as she is not reliably eating midday; however it is uncertain whether insurance will cover other long acting insulin formulations. If she is able to get long acting insulin will start her on 20 unit(s) once daily with goal of blood glucose in  range. If higher than 200 consistently will start increasing her dose.   Advised to control meal time sugars she will continue Prandin 0.5 mg with carbohydrate containing meals. GLP-1 could be considered if needed for meal time coverage as short acting insulin may be challenging with memory concerns.    Should other long acting insulin be not covered, will have her resume NPH at 10 unit(s) twice daily.   - insulin glargine (BASAGLAR KWIKPEN) 100 UNIT/ML pen  Dispense: 15 mL; Refill: 1      Follow-up: 4-6 weeks, provider or CDE for insulin titration, education and rx of Glucagon if continues to be warranted.      >50% of 30 minute visit spent in face to face counseling, education and coordination of care related to options for better glycemic control as well as preventing, detecting, and treating hypoglycemia.       It is my privilege to be involved in the care of the above patient.     Muriel Will PA-C, [facility]  [facility]  Diabetes, Endocrinology, and Metabolism  [phone] Appointments/Nurse  [phone] Fax  [phone] pager  [phone] nurse line    Scribe Disclosure:  I, Ashok Barr, am serving as a scribe to document services personally performed by Muriel Will PA-C at this visit, based upon the provider's statements to me. All documentation has been reviewed by the aforementioned provider prior to being entered into the official medical record.    Portions of this medical record were  completed by a scribe. UPON MY REVIEW AND AUTHENTICATION BY ELECTRONIC SIGNATURE, this confirms (a) I performed the applicable clinical services, and (b) the record is accurate.

## 2019-05-07 NOTE — PATIENT INSTRUCTIONS
It is good to see you today!    I hope that you continue to feel better soon.    We are going to try and switch you to one long acting inulin once daily.  Our goal is for your blood sugar to be nearly always 90 - 200, in the morning closer to 100 and in the evening closer to 200 is okay.  If your blood sugars are often higher as we start with a low dose of long acting insulin, please call our office to discuss increasing your dose.    Please start with Basaglar/Lantus 20 units once daily.    Please continue Prandin with each meals.      My best wishes,    Muriel Will PA-C, MPAS  [facility]  Diabetes, Endocrinology, and Metabolism  [phone] Appointments/Nurse  [phone] Fax  [phone] nurse line  [phone] URGENTafter hours/weekend Endocrinologist on call

## 2019-05-07 NOTE — PATIENT INSTRUCTIONS
Take your medicines every day, as directed    Changes made today:  o Increase Bumex to 2 mg in the morning and 1 mg in the afternoon  o Increase potassium to 2 tablets with breakfast, lunch, and dinner   Monitor Your Weight and Symptoms    Contact us if you:      Gain 2 pounds in one day or 5 pounds in one week    Feel more short of breath    Notice more leg swelling    Feel lightheadeded   Change your lifestyle    Limit Salt or Sodium:    2000 mg  Limit Fluids:    2000 mL or approximately 64 ounces  Eat a Heart Healthy Diet    Low in saturated fats  Stay Active:    Aim to move at least 150 minutes every  week         To Contact us    During Business Hours:  [phone], option # 1 ([facility])  Then option # 3 (medical questions)     After hours, weekends or holidays:   [phone], Option #4  Ask to speak to the On-Call Cardiologist. Inform them you are a CORE/heart failure patient at the [facility].     [employer] allows you to communicate directly with your heart team through secure messaging.    [employer] can be accessed any time on your phone, computer, or tablet.    If you need assistance, we'd be happy to help!         Keep your Heart Appointments:    1. Please have blood drawn this Thursday  2. Please return to [facility] clinic within 2 weeks

## 2019-05-07 NOTE — NURSING NOTE
Chief Complaint   Patient presents with     Follow Up     Return CORE; 73 year old male, HFpEF, Labs prior.      Vitals were taken and medications were reconciled.    Ora Thomas CMA    3:30 PM
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
## 2019-05-07 NOTE — PROGRESS NOTES
"[facility]  Endocrinology  Muriel Will PA-C  05/07/2019      Chief Complaint:   Diabetes    History of Present Illness:   Mariel Ribeiro is a 73 year old female with a history of type  2 diabetes,obesity, COPD, CAD with history of stent placement, CKD stage III, hyperlipidemia, fibromyalgia, anxiety, depression, lymphedema, S/P CABG on 07/ 02/18  who presents with Odalis for evaluation of hospital follow up. She was diagnosed with type 2 diabetes mellitus in 2007. Initially managed with oral Metformin, Januvia, and Glimepiride, all have been discontinued due to declining renal fucntion. She was well managed with NPH 28 units bid and Prandin with meals when seen earlier this year by Dr. Wells.      Mariel presented to the ED 4/25/2019 with weight gain and shortness of breath. In the past Mariel has been admitted for acute on chronic heart failure with preserved ejection fraction. She was noted to have an ejection fraction of 23-40%, down from baseline of 40-45%. Mairel was maintained on Bumex this admission and received 1 additional dose of metolazone 05/01/2019.  She was diuresed a total of 13L and 8kg.  She was discharged to home on 2mg oral Bumex once daily, daily weights and follow-up in [facility].    NPH and Prandin were held on admission. Inpatient diabetes care used a sliding scale which kept her within normal limits ( with most 100-160) On dishcarge she was instructed to hold NPH , and continue 0.5 mg Prandin with meals. Creatinin was elevated above baseline of 1.4-1.5, 1.86 at time of discharge. GFR was also declined to 28-32 during hospitalization down from 39 - 42 earlier this year.  4/30/19 A1c 6.5%, hemoglobin at the time was slightly low at 11.2.. Since discharge she has felt \"barely fair\". She is not on any insulin since discharge, but continues on 0.5mg Prandin before meals.     Mariel is here today with long time friend and now housemate for >2 decades, Odalis.  While in the hospital she " "was eating more than she usually does. Her blood glucose has been high since discharge with one over 300 since 5/3 and other readings in 200s. Her vision has been blurred. Last night her BG was 105 at bedtime, she had eaten about 2 hours prior to this. 5/5 she felt ill before eating. She checks 4 times per day. Mariel has occasional lows, she had a low before cardiac rehab. Because of this low she had to eat, her NPH makes her have to eat to accommodate for lows. She has weight gain and is a \"cycle\" of eating to avoid lows with NPH. Per her  Odalis she sometimes forgets to eat so they find having to eat for NPH helpful. Her normal insulin is NPH, which she is on because her insurance was not covering other long acting they think, nor Novolog. Odalis is concerned about Mariel's overall health because she recently was discharged and is taking Bumex for heart failure. In addition Odalis says that Mariel's blood glucose will get into the 40s, which Mariel does not recall being below 70, but notes indeed her memory is poor. To correct lows she takes 3 mints or drinks ginger ale. Mariel reports a loss of consciousness due to hypoglycemia while at rehab, Odalis states that has never happened at home or to her knowledge, they have not had difficulty treating lows with oral intake.     Feeling poor since discharge with mood swings.     Blood Glucose Monitoring:  No glucometer today. Reports blood glucose >200 most days since discharge, range 105 - 300+.    Diabetes monitoring and complications:  CAD: Yes: coronary artery disease, CABG, cerebrovascular disease   Last eye exam results: 12/12/2018  Microalbuminuria: 23.69 March 2018  HTN: Yes: 100 mg Metoprolol, 75 mg Losartan, and Bumex  On Statin: Yes  On Aspirin: No  Depression: Yes    Review of Systems:   Pertinent items are noted in HPI.  All other systems are negative.    Active Medications:     Current Outpatient Medications:      insulin glargine (BASAGLAR KWIKPEN) 100 " UNIT/ML pen, Inject 20 Units Subcutaneous daily Call office if fasting BG >180., Disp: 15 mL, Rfl: 1     acetaminophen (TYLENOL) 325 MG tablet, Take 2 tablets (650 mg) by mouth every 4 hours as needed for mild pain, Disp: 100 tablet, Rfl:      albuterol (PROAIR HFA) 108 (90 Base) MCG/ACT inhaler, Inhale 1-2 puffs into the lungs every 4 hours as needed for wheezing, Disp: 8.5 g, Rfl:      aspirin (ASA) 81 MG EC tablet, Take 1 tablet (81 mg) by mouth daily, Disp: , Rfl:      atorvastatin (LIPITOR) 40 MG tablet, Take 1 tablet (40 mg) by mouth daily, Disp: 30 tablet, Rfl: 0     blood glucose (NO BRAND SPECIFIED) test strip, 1 strip by In Vitro route 4 times daily Strips per patient's preference, Disp: 400 each, Rfl: 1     blood glucose monitoring (NO BRAND SPECIFIED) meter device kit, Use to test blood sugar four times daily or as directed., Disp: 1 kit, Rfl: 0     bumetanide (BUMEX) 2 MG tablet, Take 1 tablet (2 mg) by mouth daily, Disp: 45 tablet, Rfl: 3     EPINEPHrine (EPIPEN/ADRENACLICK/OR ANY BX GENERIC EQUIV) 0.3 MG/0.3ML injection 2-pack, Inject 0.3 mLs (0.3 mg) into the muscle once as needed for anaphylaxis, Disp: 0.6 mL, Rfl: 0     escitalopram (LEXAPRO) 20 MG tablet, Take 1 tablet (20 mg) by mouth every morning, Disp: 30 tablet, Rfl: 0     ferrous gluconate (FERGON) 324 (38 Fe) MG tablet, Take 1 tablet (324 mg) by mouth Every Mon, Wed, Fri Morning, Disp: 36 tablet, Rfl: 3     folic acid (FOLVITE) 1 MG tablet, Take 2 tablets (2 mg) by mouth daily, Disp: , Rfl:      guaiFENesin (MUCINEX) 600 MG 12 hr tablet, Take 1 tablet (600 mg) by mouth 2 times daily, Disp: , Rfl:      levothyroxine (SYNTHROID/LEVOTHROID) 150 MCG tablet, Take 1 tablet (150 mcg) by mouth daily, Disp: 30 tablet, Rfl: 0     CiraNova FINEPOINT LANCETS MISC, Use to test blood sugars 2 times daily or as directed., Disp: 100 each, Rfl: prn     losartan (COZAAR) 50 MG tablet, Take 1 tablet (50 mg) by mouth every morning AND 0.5 tablets (25 mg) every  evening., Disp: 135 tablet, Rfl: 3     metoprolol tartrate (LOPRESSOR) 50 MG tablet, Take 1 tablet (50 mg) by mouth 2 times daily, Disp: 60 tablet, Rfl: 3     niacin ER (NIASPAN) 500 MG CR tablet, Take 1 tablet (500 mg) by mouth daily, Disp: , Rfl: 9     nitroGLYcerin (NITROSTAT) 0.4 MG sublingual tablet, For chest pain place 1 tablet under the tongue every 5 minutes for 3 doses. If symptoms persist 5 minutes after 1st dose call 911., Disp: 25 tablet, Rfl: 0     ONE TOUCH ULTRA (DEVICES) MISC, test blood sugar BID, Disp: 1, Rfl: 0     potassium chloride ER (K-DUR/KLOR-CON M) 10 MEQ CR tablet, Take 4 tablets (40 mEq) by mouth daily, Disp: 120 tablet, Rfl: 3     pregabalin (LYRICA) 100 MG capsule, Take 1 capsule (100 mg) by mouth 2 times daily, Disp: 180 capsule, Rfl: 3     repaglinide (PRANDIN) 0.5 MG tablet, Take 1 tablet (0.5 mg) by mouth 3 times daily (before meals), Disp: 90 tablet, Rfl: 3     senna-docusate (SENOKOT-S/PERICOLACE) 8.6-50 MG tablet, Take 1-2 tablets by mouth 2 times daily as needed for constipation, Disp: 30 tablet, Rfl: 0     traZODone (DESYREL) 50 MG tablet, TAKE 3 TABLETS(150 MG) BY MOUTH AT BEDTIME, Disp: 270 tablet, Rfl: 1     vitamin D3 (CHOLECALCIFEROL) 63139 units (250 mcg) capsule, Take 1 capsule (10,000 Units) by mouth daily, Disp: , Rfl:       Allergies:   Contrast dye; Fish allergy; Iodine; Oxycodone; Tree nuts [nuts]; Albuterol; Bactrim; Betadine [povidone iodine]; Combivent; Dulaglutide; Fish; Lisinopril; Penicillins; Allopurinol; Latex; and Wool fiber      Past Medical History:  Anxiety  Concussion   COPD  Coronary artery disease  Depression  GERD (gastroesophageal reflux disease)  Gout  Thyroid cancer  Migraines  Lymphedema  Mononeuritis  Obstructive sleep apnea  Hyperlipidemia  Renal failure  Type 2 diabetes mellitus  Hypothyroidism  Migraine  Vitamin D deficiency  Chronic diastolic heart failure  Osteoarthritis  TIA  Fatty liver  Hepatomegaly  Carotid artery disease  Spinal  "stenosis     Past Surgical History:  Angiography  Knee arthroplasty  CABG  Bilateral TKA  Cataract  Colonoscopy  Dilatation and curettage  Angiocardiogram  Knee arthroscopy  Cholecystectomy   Dentures  Thyroidectomy  Right thumb surgery  Right breast biopsy  Left rotator cuff surgery  Left thumb surgery    Family History:   Mother: coronary artery disease, diabetes mellitus, hypertension, DVT, carotid artery stenosis, glaucoma, macular degeneration  Father: coronary artery disease, hypertension, cerebrovascular disease, alcoholism  Brother: cancer, hypertension, coronary artery disease   Sister: cardiovascular, hypertension, coronary artery disease, diabetes mellitus     Social History:   Unmarried  Former smoker  Her eating is sporadic.  She often is not hungry or is at cardiac rehabilitation midday.  She does skip meals if not hungry.       Physical Exam:   /70   Pulse 75   Wt 144.9 kg (319 lb 6.4 oz)   BMI 50.78 kg/m       Wt Readings from Last 10 Encounters:   05/07/19 144.9 kg (319 lb 6.4 oz)   05/03/19 145.8 kg (321 lb 8 oz)   04/09/19 149.7 kg (330 lb)   03/13/19 146.1 kg (322 lb)   02/13/19 141.5 kg (312 lb)   02/04/19 (P) 141.9 kg (312 lb 12.8 oz)   01/28/19 137.9 kg (304 lb)   12/24/18 137.9 kg (304 lb)   12/11/18 135.6 kg (299 lb)   11/29/18 137.9 kg (304 lb 1.6 oz)        General: Pleasant, well nourished and hydrated female in NAD.   Psych:  Mood is \"good,\" affect is appropriate.  Thought form and content are fluid and coherent.    HEENT: Eyes and sclera are clear. Extraocular movements are grossly intact without proptosis.  Nares are patent, mucous membranes moist.  Neck: No masses or JVD are noted.    Resp: Easy and unlabored breathing.   Neuro: Alert and oriented, communicating clearly.   Ext: no swelling or edema      Data:  Lab Results   Component Value Date     05/03/2019    POTASSIUM 3.4 05/03/2019    CHLORIDE 99 05/03/2019    CO2 32 05/03/2019    ANIONGAP 6 05/03/2019     " (H) 05/03/2019    BUN 68 (H) 05/03/2019    CR 1.86 (H) 05/03/2019    ANTOINETTE 9.6 05/03/2019     Lab Results   Component Value Date    GFRESTIMATED 26 (L) 05/03/2019    GFRESTIMATED 26 (L) 05/02/2019    GFRESTIMATED 27 (L) 05/02/2019    GFRESTBLACK 31 (L) 05/03/2019    GFRESTBLACK 30 (L) 05/02/2019    GFRESTBLACK 31 (L) 05/02/2019      Lab Results   Component Value Date    MICROL 13 03/19/2018    UMALCR 26.39 (H) 03/19/2018        Lab Results   Component Value Date    A1C 6.5 (H) 04/30/2019    A1C 5.2 09/10/2018    A1C 9.3 (H) 07/01/2018    A1C 8.2 (H) 03/19/2018    A1C 7.3 (H) 08/09/2017    HEMOGLOBINA1 6.5 (A) 04/03/2019    HEMOGLOBINA1 5.5 11/29/2018     Lab Results   Component Value Date    CHOL 170 03/13/2019    CHOL 203 (H) 03/19/2018    TRIG 118 03/13/2019    TRIG 212 (H) 03/19/2018    HDL 71 03/13/2019    HDL 50 03/19/2018    LDL 76 03/13/2019     (H) 03/19/2018    NHDL 99 03/13/2019    NHDL 153 (H) 03/19/2018     TSH   Date Value Ref Range Status   08/28/2018 2.97 0.40 - 4.00 mU/L Final   03/19/2018 1.15 0.40 - 4.00 mU/L Final   11/15/2017 0.49 0.40 - 4.00 mU/L Final   08/23/2017 0.04 (L) 0.40 - 4.00 mU/L Final   05/18/2017 0.25 (L) 0.40 - 4.00 mU/L Final       Assessment and Plan:     Type 2 diabetes mellitus with stage 3 chronic kidney disease, with long-term current use of insulin (H)  Type 2 diabetes mellitus with marginal blood glucose control recently with high risk of further complications from hyper and hypoglycemia. From a cardiac standpoint she would benefit from starting a GLP agonist or SGLT2i, however renal function is compromised at this point in time. GLP-1 could be considered with caution.  Her A1c was 6.5 most recently, consistent with excessive hypoglycemia for her frail health.     Together we reviewed long acting insulin options, it would be best to change away from NPH so she does not have to eat to prevent lows and to prevent lows and falls as she is not reliably eating midday;  however it is uncertain whether insurance will cover other long acting insulin formulations. If she is able to get long acting insulin will start her on 20 unit(s) once daily with goal of blood glucose in  range. If higher than 200 consistently will start increasing her dose.   Advised to control meal time sugars she will continue Prandin 0.5 mg with carbohydrate containing meals. GLP-1 could be considered if needed for meal time coverage as short acting insulin may be challenging with memory concerns.    Should other long acting insulin be not covered, will have her resume NPH at 10 unit(s) twice daily.   - insulin glargine (BASAGLAR KWIKPEN) 100 UNIT/ML pen  Dispense: 15 mL; Refill: 1      Follow-up: 4-6 weeks, provider or CDE for insulin titration, education and rx of Glucagon if continues to be warranted.      >50% of 30 minute visit spent in face to face counseling, education and coordination of care related to options for better glycemic control as well as preventing, detecting, and treating hypoglycemia.       It is my privilege to be involved in the care of the above patient.     Muriel Will PA-C, [facility]  [facility]  Diabetes, Endocrinology, and Metabolism  [phone] Appointments/Nurse  [phone] Fax  [phone] pager  [phone] nurse line      Scribe Disclosure:  I, Ashok Barr, am serving as a scribe to document services personally performed by Muriel Will PA-C at this visit, based upon the provider's statements to me. All documentation has been reviewed by the aforementioned provider prior to being entered into the official medical record.       Portions of this medical record were completed by a scribe. UPON MY REVIEW AND AUTHENTICATION BY ELECTRONIC SIGNATURE, this confirms (a) I performed the applicable clinical services, and (b) the record is accurate.

## 2019-05-08 ENCOUNTER — TELEPHONE (OUTPATIENT)
Dept: FAMILY MEDICINE | Facility: CLINIC | Age: 74
End: 2019-05-08

## 2019-05-08 ENCOUNTER — OFFICE VISIT (OUTPATIENT)
Dept: SLEEP MEDICINE | Facility: CLINIC | Age: 74
End: 2019-05-08
Payer: MEDICARE

## 2019-05-08 VITALS
BODY MASS INDEX: 47.09 KG/M2 | DIASTOLIC BLOOD PRESSURE: 67 MMHG | HEIGHT: 66 IN | WEIGHT: 293 LBS | SYSTOLIC BLOOD PRESSURE: 142 MMHG | OXYGEN SATURATION: 95 % | HEART RATE: 60 BPM | RESPIRATION RATE: 16 BRPM

## 2019-05-08 DIAGNOSIS — G47.33 OSA (OBSTRUCTIVE SLEEP APNEA): Primary | ICD-10-CM

## 2019-05-08 PROCEDURE — 99213 OFFICE O/P EST LOW 20 MIN: CPT | Performed by: INTERNAL MEDICINE

## 2019-05-08 ASSESSMENT — MIFFLIN-ST. JEOR: SCORE: 1973.47

## 2019-05-08 NOTE — TELEPHONE ENCOUNTER
[facility] came and evaluated patient for homecare yesterday.  They need orders for:    Skilled nursing visits, 1 time a week for 1 week, then 2 times a week for 1 week, then 1 times a week for 2 weeks, with 3 prn visits.    1.) PT & 2.) OT, 3.) Lymphedema: eval & treat.    Home Health Aide, 1 time a week for 4 weeks.    Basic wound care orders for right groin open area:  Cleanse with normal solution, cover with foam or gauze, secure with tape.  Change 3 times a week or as needed.    Please call.  OK to ROMA on VM.
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
## 2019-05-08 NOTE — TELEPHONE ENCOUNTER
I called kassie and left a message approving skilled nursing visits for 1 x a week x 1 week, 2 times a week x 1 week, then 1 x a week x 2 weeks, with 3 as needed visits.     1.) PT,  OT and Lymphedema: eval & treat.     Home Health Aide, 1 x a week x 4 weeks.     Basic wound care orders for right groin open area:  Cleanse with normal solution, cover with foam or gauze, secure with tape.  Change 3 x a week or as needed.    Hetal Mane RN

## 2019-05-08 NOTE — PATIENT INSTRUCTIONS
Your BMI is Body mass index is 51.51 kg/m .  Weight management is a personal decision.  If you are interested in exploring weight loss strategies, the following discussion covers the approaches that may be successful. Body mass index (BMI) is one way to tell whether you are at a healthy weight, overweight, or obese. It measures your weight in relation to your height.  A BMI of 18.5 to 24.9 is in the healthy range. A person with a BMI of 25 to 29.9 is considered overweight, and someone with a BMI of 30 or greater is considered obese. More than two-thirds of [place] adults are considered overweight or obese.  Being overweight or obese increases the risk for further weight gain. Excess weight may lead to heart disease and diabetes.  Creating and following plans for healthy eating and physical activity may help you improve your health.  Weight control is part of healthy lifestyle and includes exercise, emotional health, and healthy eating habits. Careful eating habits lifelong are the mainstay of weight control. Though there are significant health benefits from weight loss, long-term weight loss with diet alone may be very difficult to achieve- studies show long-term success with dietary management in less than 10% of people. Attaining a healthy weight may be especially difficult to achieve in those with severe obesity. In some cases, medications, devices and surgical management might be considered.  What can you do?  If you are overweight or obese and are interested in methods for weight loss, you should discuss this with your provider.     Consider reducing daily calorie intake by 500 calories.     Keep a food journal.     Avoiding skipping meals, consider cutting portions instead.    Diet combined with exercise helps maintain muscle while optimizing fat loss. Strength training is particularly important for building and maintaining muscle mass. Exercise helps reduce stress, increase energy, and improves fitness.  Increasing exercise without diet control, however, may not burn enough calories to loose weight.       Start walking three days a week 10-20 minutes at a time    Work towards walking thirty minutes five days a week     Eventually, increase the speed of your walking for 1-2 minutes at time    In addition, we recommend that you review healthy lifestyles and methods for weight loss available through the [employer] patient information sites:  http://win.niddk.nih.gov/publications/index.htm    And look into health and wellness programs that may be available through your health insurance provider, employer, local community center, or [employer].    Weight management plan: Patient was referred to their PCP to discuss a diet and exercise plan.     Your blood pressure was checked while you were in clinic today.  Please read the guidelines below about what these numbers mean and what you should do about them.  Your systolic blood pressure is the top number.  This is the pressure when the heart is pumping.  Your diastolic blood pressure is the bottom number.  This is the pressure in between beats.  If your systolic blood pressure is less than 120 and your diastolic blood pressure is less than 80, then your blood pressure is normal. There is nothing more that you need to do about it  If your systolic blood pressure is 120-139 or your diastolic blood pressure is 80-89, your blood pressure may be higher than it should be.  You should have your blood pressure re-checked within a year by a primary care provider.  If your systolic blood pressure is 140 or greater or your diastolic blood pressure is 90 or greater, you may have high blood pressure.  High blood pressure is treatable, but if left untreated over time it can put you at risk for heart attack, stroke, or kidney failure.  You should have your blood pressure re-checked by a primary care provider within the next four weeks.

## 2019-05-09 ENCOUNTER — ALLIED HEALTH/NURSE VISIT (OUTPATIENT)
Dept: PHARMACY | Facility: CLINIC | Age: 74
End: 2019-05-09
Payer: COMMERCIAL

## 2019-05-09 DIAGNOSIS — E11.22 TYPE 2 DIABETES MELLITUS WITH CHRONIC KIDNEY DISEASE, WITHOUT LONG-TERM CURRENT USE OF INSULIN, UNSPECIFIED CKD STAGE (H): ICD-10-CM

## 2019-05-09 DIAGNOSIS — E66.09 NON MORBID OBESITY DUE TO EXCESS CALORIES: ICD-10-CM

## 2019-05-09 DIAGNOSIS — I50.32 CHRONIC DIASTOLIC HEART FAILURE (H): Primary | ICD-10-CM

## 2019-05-09 PROCEDURE — 99607 MTMS BY PHARM ADDL 15 MIN: CPT | Performed by: PHARMACIST

## 2019-05-09 PROCEDURE — 99605 MTMS BY PHARM NP 15 MIN: CPT | Performed by: PHARMACIST

## 2019-05-09 NOTE — PROGRESS NOTES
SUBJECTIVE/OBJECTIVE:                Mariel Ribeiro is a 73 year old female called for a transitions of care visit.  She was discharged from [facility] on 5-3-19 for SOB, weight gain.     Chief Complaint:   She is feeling worn out .     She is being seen by in home health care.  She see's cards NP are [facility] Starla Saez --5-22-19(starla  thinks she can drop another 20 lbs.).  She will start walking again in her house.     She was depressed that weight was going up --she now has a daily chart that she weighs her self and now low sodium diet and is doing better.   She does feel overwhelmed with  Number of meds she takes .     She admits chocolate ghiradelli is her downfall.  longterm when she doesn't see results she gets discouraged and depressed and says the hell with it and eats sugar.     .  Personal Healthcare Goals: stay ambulatory avoid HF exacerbations. Hope to be down to below 300lbs . By summertime ! Cutting out all the sweets .     Allergies/ADRs: Reviewed in Epic  Tobacco: No tobacco use   Alcohol: not currently using  Caffeine: no caffeine, just vitamin zero water .   Activity: sedentary   PMH: Reviewed in Epic    Medication Adherence/Access:  no issues reported  Patient uses pill box(es).  Yellow-am , white -afternoon, blue for night time pills.   Patient takes medications 3 time(s) per day.   Per patient, misses medication 0 times per week.   Medication barriers: none.   The patient fills medications at [place]: YES.    [facility]   Cardiology Discharge Summary        Date of Admission:  4/25/2019  Date of Discharge:  05/03/2019          Discharging Provider: Sultana Feliz MD/Brittany Fuentes PA-C     Discharge Instructions:  1. Please begin taking 2mg oral Bumex, once daily, and 1/2 tab added at lunch per core on 5-7-19.   2. Please begin taking 40 mEq of oral Potassium, once daily with Bumex; she has 10meq tabs - now takes 20meq's three times daily.   3. In regards to your insulin,  please hold your NPH as your blood glucoses have been so well controlled; that being said, we ask that you please see your endocrinology team as soon as possible to decide if your NPH should be resumed. You will need to call your clinic on Monday morning and ask for an appointment as soon as possible. If you are UNABLE to get into your endocrinology clinic, please contact your primary care provider to discuss your diabetes within 1 week of discharge.  4. Please take prandin 1mg three times daily, with meals; prandin doses increased per endo clinic 5-5-19.   She started 20 units lantus daily now as well.   5. Please follow up in [facility] clinic 05/07/2019 with Starla Torrez NP        Primary Care                            [facility]aela William  [address] [place] [address]      Identification and Chief Complaint: Mariel Ribeiro is a 73 year old female who presented on 4/25/2019 with complaint of shortness of breath and weight gain.        Discharge Diagnoses       Acute on Chronic diastolic heart failure (H)    Coronary atherosclerosis    Hypothyroidism    Migraine    Chronic airway obstruction/ COPD    Obstructive sleep apnea    Hypertension goal BP (blood pressure) < 130/80    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)    Hyperlipidemia with target LDL less than 70    Transient cerebral ischemia    S/P CABG x 3    Type 2 diabetes mellitus with stage 3 chronic kidney disease, with long-term current use of insulin (H)    Lymphedema             Discharge Disposition      Discharged to rehabilitation facility          Discharge Orders             Medication Therapy Management Referral       LYMPHEDEMA THERAPY REFERRAL       Wound Care Referral       Home care nursing referral       Mantoux instructions     Give two-step Mantoux (PPD) Per Facility Policy Yes          Reason for your hospital stay     Heart failure with preserved ejection fraction          Daily weights     Call Provider for weight gain of more  than 2 pounds per day or 5 pounds per week.      Activity - Up ad vaughn          MD face to face encounter     Documentation of Face to Face and Certification for Home Health Services     I certify that patient: Mariel Ribeiro is under my care and that I, or a nurse practitioner or physician's assistant working with me, had a face-to-face encounter that meets the physician face-to-face encounter requirements with this patient on: 5/3/2019.     This encounter with the patient was in whole, or in part, for the following medical condition, which is the primary reason for home health care: HFpEF, CAD and DM2.     I certify that, based on my findings, the following services are medically necessary home health services: Nursing, Occupational Therapy and Physical Therapy.     My clinical findings support the need for the above services because: Nurse is needed: To assess after changes in medications or other medical regimen, Occupational Therapy Services are needed to assess and treat cognitive ability and address ADL safety due to impairment and Physical Therapy Services are needed to assess and treat the following functional impairments: deconditioning, strengthening and endurance.     Further, I certify that my clinical findings support that this patient is homebound (i.e. absences from home require considerable and taxing effort and are for medical reasons or [employer] services or infrequently or of short duration when for other reasons) because: Requires assistance of another person or specialized equipment to access medical services because patient: Is unable to exit home safely on own.     Based on the above findings. I certify that this patient is confined to the home and needs intermittent skilled nursing care, physical therapy and/or speech therapy.  The patient is under my care, and I have initiated the establishment of the plan of care.  This patient will be followed by a physician who will periodically review  the plan of care.  Physician/Provider to provide follow up care: Rafaela William     Attending hospital physician (the Medicare certified [place] provider): Sultana Feliz MD     Physician Signature: See electronic signature associated with these discharge orders.  Date: 5/3/2019          Fluid restriction     2000cc Q24 hours      Fall precautions          Advance Diet as Tolerated     Follow this diet upon discharge: Orders Placed This Encounter      Combination Diet 2 gm NA Diet;           Discharge Medications           Current Discharge Medication List             CONTINUE these medications which have CHANGED     Details   acetaminophen (TYLENOL) 325 MG tablet Take 2 tablets (650 mg) by mouth every 4 hours as needed for mild pain  Qty: 100 tablet     Associated Diagnoses: S/P CABG x 3; Acute post-operative pain       albuterol (PROAIR HFA) 108 (90 Base) MCG/ACT inhaler Inhale 1-2 puffs into the lungs every 4 hours as needed for wheezing  Qty: 8.5 g     Associated Diagnoses: Chronic obstructive pulmonary disease, unspecified COPD type (H)       aspirin (ASA) 81 MG EC tablet Take 1 tablet (81 mg) by mouth daily     Associated Diagnoses: (HFpEF) heart failure with preserved ejection fraction (H)       atorvastatin (LIPITOR) 40 MG tablet Take 1 tablet (40 mg) by mouth daily  Qty: 30 tablet, Refills: 0     Associated Diagnoses: Atherosclerosis of native coronary artery without angina pectoris, unspecified whether native or transplanted heart       bumetanide (BUMEX) 2 MG tablet Take 1 tablet (2 mg) by mouth daily  Qty: 45 tablet, Refills: 3     Associated Diagnoses: Chronic diastolic heart failure (H)       EPINEPHrine (EPIPEN/ADRENACLICK/OR ANY BX GENERIC EQUIV) 0.3 MG/0.3ML injection 2-pack Inject 0.3 mLs (0.3 mg) into the muscle once as needed for anaphylaxis  Qty: 0.6 mL, Refills: 0     Associated Diagnoses: Allergic reaction, subsequent encounter       escitalopram (LEXAPRO) 20 MG tablet Take 1 tablet (20 mg) by  mouth every morning  Qty: 30 tablet, Refills: 0     Associated Diagnoses: Recurrent major depressive disorder, in partial remission (H)       ferrous gluconate (FERGON) 324 (38 Fe) MG tablet Take 1 tablet (324 mg) by mouth Every Mon, Wed, Fri Morning  Qty: 36 tablet, Refills: 3     Associated Diagnoses: Chronic diastolic heart failure (H); Iron deficiency anemia secondary to inadequate dietary iron intake       folic acid (FOLVITE) 1 MG tablet Take 2 tablets (2 mg) by mouth daily     Associated Diagnoses: (HFpEF) heart failure with preserved ejection fraction (H)       guaiFENesin (MUCINEX) 600 MG 12 hr tablet Take 1 tablet (600 mg) by mouth 2 times daily     Associated Diagnoses: (HFpEF) heart failure with preserved ejection fraction (H)       levothyroxine (SYNTHROID/LEVOTHROID) 150 MCG tablet Take 1 tablet (150 mcg) by mouth daily  Qty: 30 tablet, Refills: 0     Associated Diagnoses: Other specified hypothyroidism       losartan (COZAAR) 50 MG tablet Take 1 tablet (50 mg) by mouth every morning AND 0.5 tablets (25 mg) every evening.  Qty: 135 tablet, Refills: 3     Associated Diagnoses: Chronic diastolic heart failure (H)       metoprolol tartrate (LOPRESSOR) 50 MG tablet Take 1 tablet (50 mg) by mouth 2 times daily  Qty: 60 tablet, Refills: 3     Associated Diagnoses: (HFpEF) heart failure with preserved ejection fraction (H)       niacin ER (NIASPAN) 500 MG CR tablet Take 1 tablet (500 mg) by mouth daily  Refills: 9     Associated Diagnoses: (HFpEF) heart failure with preserved ejection fraction (H)       nitroGLYcerin (NITROSTAT) 0.4 MG sublingual tablet For chest pain place 1 tablet under the tongue every 5 minutes for 3 doses. If symptoms persist 5 minutes after 1st dose call 911.  Qty: 25 tablet, Refills: 0     Associated Diagnoses: Atherosclerosis of native coronary artery without angina pectoris, unspecified whether native or transplanted heart       potassium chloride ER (K-DUR/KLOR-CON M) 10 MEQ CR  tablet Take 4 tablets (40 mEq) by mouth daily  Qty: 120 tablet, Refills: 3     Associated Diagnoses: Chronic diastolic heart failure (H)       pregabalin (LYRICA) 100 MG capsule Take 1 capsule (100 mg) by mouth 2 times daily  Qty: 180 capsule, Refills: 3     Associated Diagnoses: Pain in joint of left shoulder       repaglinide (PRANDIN) 0.5 MG tablet Take 1 tablet (0.5 mg) by mouth 3 times daily (before meals)  Qty: 90 tablet, Refills: 3     Associated Diagnoses: Type 2 diabetes mellitus with stage 3 chronic kidney disease, with long-term current use of insulin (H)       senna-docusate (SENOKOT-S/PERICOLACE) 8.6-50 MG tablet Take 1-2 tablets by mouth 2 times daily as needed for constipation  Qty: 30 tablet, Refills: 0     Associated Diagnoses: Atherosclerosis of native coronary artery without angina pectoris, unspecified whether native or transplanted heart       traZODone (DESYREL) 50 MG tablet TAKE 3 TABLETS(150 MG) BY MOUTH AT BEDTIME  Qty: 270 tablet, Refills: 1     Associated Diagnoses: Insomnia, unspecified type       vitamin D3 (CHOLECALCIFEROL) 58119 units (250 mcg) capsule Take 1 capsule (10,000 Units) by mouth daily     Associated Diagnoses: (HFpEF) heart failure with preserved ejection fraction (H)                 CONTINUE these medications which have NOT CHANGED     Details   blood glucose (NO BRAND SPECIFIED) test strip 1 strip by In Vitro route 4 times daily Strips per patient's preference  Qty: 400 each, Refills: 1     Associated Diagnoses: Type 2 diabetes mellitus with diabetic polyneuropathy, without long-term current use of insulin (H)       blood glucose monitoring (NO BRAND SPECIFIED) meter device kit Use to test blood sugar four times daily or as directed.  Qty: 1 kit, Refills: 0     Comments: One touch mini  Associated Diagnoses: Type 2 diabetes mellitus with stage 3 chronic kidney disease, with long-term current use of insulin (H)       "[employer]CAN FINEPOINT LANCETS MISC Use to test blood sugars  2 times daily or as directed.  Qty: 100 each, Refills: prn     Associated Diagnoses: Type 2 diabetes mellitus with diabetic polyneuropathy, without long-term current use of insulin (H)       ONE TOUCH ULTRA (DEVICES) MISC test blood sugar BID  Qty: 1, Refills: 0     Associated Diagnoses: Type II or unspecified type diabetes mellitus without mention of complication, not stated as uncontrolled                STOP taking these medications         insulin isophane human (HUMULIN N KWIKPEN) 100 UNIT/ML injection Comments:   Reason for Stopping:                      Allergies            Allergies   Allergen Reactions     Contrast Dye Anaphylaxis     Fish Allergy Anaphylaxis     Iodine Anaphylaxis     Oxycodone Other (See Comments)       Severe suicidal tendencies on this medication     Tree Nuts [Nuts] Anaphylaxis       Tree nuts only (peanuts ok)     Albuterol Other (See Comments)       Sandrita-oral erythema  Confirmed 7/3/18 that patient uses albuterol inhalers at home. Patient had her home inhaler in her bag.     Bactrim         Increased uric acid     Betadine [Povidone Iodine] Hives, Swelling and Difficulty breathing       Betadine     Combivent         Rash     Dulaglutide Other (See Comments)       Hx . Of thyroid cancer.     Fish       Lisinopril Other (See Comments)       Scr/grf severely reduced.      Penicillins Rash     Allopurinol Rash     Latex Rash     Wool Fiber Rash         Consultations This Hospital Stay  MEDICATION HISTORY IP PHARMACY CONSULT  PHYSICAL THERAPY ADULT IP CONSULT  OCCUPATIONAL THERAPY ADULT IP CONSULT  CORE CLINIC EVALUATION IP CONSULT  LYMPHEDEMA THERAPY IP CONSULT  VASCULAR ACCESS CARE ADULT IP CONSULT  NEUROLOGY STROKE ADULT IP CONSULT  WOUND OSTOMY CONTINENCE NURSE  IP CONSULT  SPIRITUAL HEALTH SERVICES IP CONSULT          Significant Results and Procedures     None          Data          Results for orders placed or performed during the hospital encounter of 04/25/19   Chest XR,  PA &  LAT     Narrative     XR CHEST 2 VW  4/25/2019 3:23 PM       HISTORY: right chest pain     COMPARISON: 1/28/2019     FINDINGS: The upper abdomen is unremarkable. The cardiac silhouette is  mildly enlarged, but stable. Sternotomy wires are present. No  pneumothorax or pleural effusion. Increased pulmonary vascular  congestion. No focal airspace opacities. Superior  mediastinal/cervicothoracic surgical clips        Impression     IMPRESSION:   1. Worsening pulmonary vascular congestion, likely representing  pulmonary edema.   2. Enlarged cardiac silhouette.     I have personally reviewed the examination and initial interpretation  and I agree with the findings.     RAFITA ENGLAND MD   Lung vent and perf (NM)     Narrative       Hospital Course     Mariel Ribeiro was admitted on 4/25/2019.  The following problems were addressed during her hospitalization:     Acute on Chronic Heart Failure with Preserved Ejection Fraction  The patient presented with progressively worsening SOB/orthopnea, weight gain of approximately 30 lbs (baseline weight approximately 300-310lbs), and edema of her lower extremities/abdomen. CXR was remarkable for pulmonary edema. Notably, the patient has had multiple admission for acute on chronic HFpEF following her CABG 07/2018; these are consistently due to dietary indiscretion and medication non-compliance. On admission, there was no other obvious etiology for her shortness of breath. The patient had no EKG changes and troponin's were negative; as such, ACS was unlikely.  Similarly, there was no evidence of PNA such as leukocytosis or focal consolidation on CXR on admission. Finally, a VQ scan was negative for PE.  The patient's admission Echo showed an EF decrease of 35-40% from 40-45% with dilated IVC. The patient was initiated on 2mg IV Bumex BID in addition to metolazone.  The patient was maintained on Bumex this admission and received 1 additional dose of metolazone 05/01/2019.  She was  diuresed a total of 13L and 8kg.  She will discharge to home on 2mg oral Bumex once daily in the hopes that she continues to gently diurese.  In addition, we have ordered a 2 gram sodium and 2L fluid restriction diet for the patient to follow at home.  The patient will continue outpatient Lymphedema and [facility] consultations/appointments. The patient was discharged to home with homecare in the hopes to improve mobility, balance, and strength. The patient will be seen in [facility] clinic 05/07/2019 for follow up.     Confusion, Left Sided Weakness, Monoptic Diplopia  Onset noted around 10:30am 04/30/2019. Stroke neurology called emergently.  Exam/findings consist with complex migraine in the setting of previous symptoms with negative imaging. Neurologic findings remained stable 72 hours/throughout the rest of her admission.     Right Groin Fold Wound   Noted this admission; a consult for wound RN will be placed at discharge for ongoing management.     CAD status post CABG 07/2018, HLD, HTN  The patient has a history of multiple previous PCIs; given three vessel disease noted 072018, she underwent CABG.  The patient's CABG was complicated by a prolonged hospital course secondary to weakness. She was maintained on goal directed medical therapy this admission, as below.  - Antiplatelet: ASA 81 mg daily   - Statin: Lipitor 40 mg daily   - BB: Metoprolol 50 mg BID (reduced from PTA given relative bradycardia)  - ACE: 25/50mg losartan daily     DM II   On admission, the patient's NPH and prandin were held. Her blood glucoses, with the use of medium sliding scale insulin, were within normal limits (ranging from , with most blood glucoses between 100-160).  Diabetes regimen was discussed with endocrinology prior to discharge; she will continue 0.5mg prandin three times daily with meals on discharge.  She will hold her NPH on discharge and will be seen in either endocrinology (with whom she currently follows for diabetes) or  primary care as soon as possible to discuss if she should resume her NPH.     CKD III   Creatinine baseline is 1.4-1.5. The patient had a slight increase of her creatinine mid-admission, which improved with ongoing diuresis. At discharge, the patient's creatinine was 1.86; while elevated from baseline, we suspect that this will continue to improve as the patient's body reaches physiological equilibration of fluid (shift from subcutaneous fat tissue to intravascular space).     Hypercapnia - resolved  The patient had increased somnolence on 4/27 in AM and was found to have increased CO2 on VBG to 68. After placement of BiPAP she had improvement in cognition. The patient had no further episodes of somnolence secondary to hypercapnia.  We ask that she continue to use her PTA CPAP on discharge.     Other chronic issues:   COPD - no overt wheeze/exacerbation this admission.  She will continue her COPD therapies at discharge.  Mononeuronitis/Osteoarthritis - She will continue PTA lyrica on discharge.  Vitamin D deficiency - She will continue PTA supplementation at discharge  Hypothyroid - She will continue PTA levothyroxine at discharge.  Depression - She will continue her PTA lexapro, trazodone at discharge.  BELEN - The patient was maintained on in-house BiPAP this admission.  She will need to continue her outpatient therapies with home CPAP machine at discharge.        Weighting daily--dry weight 316 lbs this am , down from 320 upon discharge.  BP = 155/65mmhg.  She notes home bp's vary a lot. Struggles with systolic htn .   Oxygen 95%     BP Readings from Last 3 Encounters:   05/08/19 142/67   05/07/19 114/61   05/07/19 150/70         Obesity:  She weighs 316lbs. today -- -her roommate makes her 3 main meals /day , she is also addicted to chocolate --she is so frustrated with her 316 lbs .  We discussed LCHF and intermittent fasting --she is intrigued and wants to wean off insulin and prandin as this hormone causes all  "our weight gain .             ASSESSMENT:                 Current medications were reviewed today.      Medication Adherence: excellent, no issues identified    HFrEF/HTN: Improved. Patient is not meeting BP goal of < 140/90mmHg. EF 35% - 39%. Pt is on appropriate ACE/ARB and  is on appropriate beta blocker: Metoprolol tartrate BID . Patient pulse is > 50.. Current weight is stable and diuretic dose is appropriate. Pt is not a candidate for mineralcorticoid receptor blocker. Pt is not a candidate for hydralazine/isosorbide. Pt is appropriately avoiding NSAID's, diltiazem/verapamil, and pioglitazone.Pt would benefit from sodium restriction: 1,500 mg of sodium, monitoring daily weight and further education.     Obesity:  Needs improvement : see plan --she will see me in 2 weeks for lchf diet + intermittent fasting.         PLAN:                  Post Discharge Medication Reconciliation Status: discharge medications reconciled, continue medications without change.      1.  FYI-- Your following the discharge orders perfectly --stay on all current medications.  Keep weighing yourself daily and follow-low-sodium and avoiding sweets as best you can .     2. Lets discuss low -carb diet and intermittent fasting --please read \"the diabetes code \" by homero cotto and/or look at his website [employer].         Next MTM visit:  See me at [facility] on Wednesday may 29th -2019 at 11am  For f/up.      I spent 60 minutes with this patient today. All changes were made via collaborative practice agreement with Rafaela William  . A copy of the visit note was provided to the patient's primary care provider.        The patient was sent via [employer] a summary of these recommendations as an after visit summary.    Karen Hilton [facility].  Medication Therapy Management Provider  [phone]      "

## 2019-05-09 NOTE — Clinical Note
thad--angelina--I spoke with iván today post transitions of care from hospital for chf.  She was quite revealing on why this occurs -- we discussed low-carb =IF diet --she wants to try this and will see me in clinic 5-29-19 . I am having her the read the book prior to that visit so she has a good understanding of what im going to want her to do .Wish us luck --dwight

## 2019-05-09 NOTE — PATIENT INSTRUCTIONS
"  Recommendations from today's MTM visit:                                                    MTM (medication therapy management) is a service provided by a clinical pharmacist designed to help you get the most of out of your medicines.   Today we reviewed what your medicines are for, how to know if they are working, that your medicines are safe and how to make your medicine regimen as easy as possible.     1.  FYI-- Your following the discharge orders perfectly --stay on all current medications.  Keep weighing yourself daily and follow-low-sodium and avoiding sweets as best you can .     2. Lets discuss low -carb diet and intermittent fasting --please read \"the diabetes code \" by homero cotto and/or look at his website [employer].         Next MTM visit:  See me at [facility] on Wednesday may 29th -2019 at 11am  For f/up.              To schedule another MTM appointment, please call the clinic directly or you may call the MTM scheduling line at [phone] or toll-free at [phone].     My Clinical Pharmacist's contact information:                                                      It was a pleasure talking with you today!  Please feel free to contact me with any questions or concerns you have.      Karen Hilton [facility].  Medication Therapy Management Provider  [phone]      You may receive a survey about the MTM services you received by email and/or US Mail.  I would appreciate your feedback to help me serve you better in the future. Your comments will be anonymous.          "

## 2019-05-10 ENCOUNTER — DOCUMENTATION ONLY (OUTPATIENT)
Dept: CARE COORDINATION | Facility: CLINIC | Age: 74
End: 2019-05-10

## 2019-05-10 NOTE — PROGRESS NOTES
[facility] now requests orders and shares plan of care/discharge summaries for some patients through [employer].  Please REPLY TO THIS MESSAGE OR ROUTE BACK TO THE AUTHOR in order to give authorization for orders when needed.  This is considered a verbal order, you will still receive a faxed copy of orders for signature.  Thank you for your assistance in improving collaboration for our patients.    ORDER: Home PT 2w3 for LE strengthening, improved activity tolerance, gait training, and fall prevention. MARCO yi for identifying community resources.    Rossi Lundberg, PT, DPT  [email or web address]  [phone]

## 2019-05-13 ENCOUNTER — TELEPHONE (OUTPATIENT)
Dept: FAMILY MEDICINE | Facility: CLINIC | Age: 74
End: 2019-05-13

## 2019-05-13 ENCOUNTER — DOCUMENTATION ONLY (OUTPATIENT)
Dept: FAMILY MEDICINE | Facility: CLINIC | Age: 74
End: 2019-05-13

## 2019-05-13 NOTE — TELEPHONE ENCOUNTER
Reason for Call: Request for an order or referral:    Order or referral being requested: wound on right groin would like orders to treat with calcium alginate dressing    Date needed: as soon as possible    Has the patient been seen by the PCP for this problem? YES    Additional comments: none    Phone number Patient can be reached at:  Other phone number:  [phone]    Best Time:  asap    Can we leave a detailed message on this number?  YES    Call taken on 5/13/2019 at 10:51 AM by Gaye Conte

## 2019-05-13 NOTE — PROGRESS NOTES
[facility] now requests orders and shares plan of care/discharge summaries for some patients through [employer].  Please REPLY TO THIS MESSAGE OR ROUTE BACK TO THE AUTHOR in order to give authorization for orders when needed.  This is considered a verbal order, you will still receive a faxed copy of orders for signature.  Thank you for your assistance in improving collaboration for our patients.    ORDER: OT lymphedema therapy 2x/week for 3 weeks to meet the following goals    GOALS TO BE MET BY 5/25/19  1. Pt will be able to tolerate gradient compression bandaging 23 hrs/day or wearing of compression  garment during waking hours to prevent reaccumulation of extracellular fluid.  2. Pt will perform fluid mobilization HEP w/ minimal assistance to improve lymphatic flow and venous return.  3. Pt/CG will be independent in edema management to maintain reduced limb girth on days home care staff are unavailable.   GOALS TO BE MET BY DISCHARGE  1. Pt will verbalize understanding of techniques to independently manage their edema at home.  2. Pt/CG will be independent in donning/doffing, wearing schedule, and care of compression garment to maintain edema long term.  3. Circumferential measurement will be reduced by 2 to 6 percent in BLE to promote safe transfers and improved functional mobility    Please respond to this message as soon as possible, thank you!  Sandhya Antonio OTR/L CLT  [phone]

## 2019-05-14 ENCOUNTER — DOCUMENTATION ONLY (OUTPATIENT)
Dept: CARE COORDINATION | Facility: CLINIC | Age: 74
End: 2019-05-14

## 2019-05-14 NOTE — PROGRESS NOTES
[facility] now requests orders and shares plan of care/discharge summaries for some patients through [employer].  Please REPLY TO THIS MESSAGE OR ROUTE BACK TO THE AUTHOR in order to give authorization for orders when needed.  This is considered a verbal order, you will still receive a faxed copy of orders for signature.  Thank you for your assistance in improving collaboration for our patients.    ORDER    OT 2w3 for HEP, EC/WS, bathroom safety, low vision, cognitive strategies.    Elza Willoughby, OTR/L  [phone]

## 2019-05-15 ENCOUNTER — PATIENT OUTREACH (OUTPATIENT)
Dept: CARE COORDINATION | Facility: CLINIC | Age: 74
End: 2019-05-15

## 2019-05-15 ENCOUNTER — DOCUMENTATION ONLY (OUTPATIENT)
Dept: SLEEP MEDICINE | Facility: CLINIC | Age: 74
End: 2019-05-15

## 2019-05-15 ASSESSMENT — ACTIVITIES OF DAILY LIVING (ADL): DEPENDENT_IADLS:: INDEPENDENT

## 2019-05-15 NOTE — PROGRESS NOTES
Clinic Care Coordination Contact  [facility]/Voicemail    Referral Source: ED Follow-Up  [facility]- 4/25-5/3/2019-  Discharge Diagnoses       Acute on Chronic diastolic heart failure (H)    Coronary atherosclerosis    Hypothyroidism    Migraine    Chronic airway obstruction/ COPD    Obstructive sleep apnea    Hypertension goal BP (blood pressure) < 130/80    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)    Hyperlipidemia with target LDL less than 70    Transient cerebral ischemia    S/P CABG x 3    Type 2 diabetes mellitus with stage 3 chronic kidney disease, with long-term current use of insulin (H)    Lymphedema           Clinical Data: Care Coordinator Outreach  Outreach attempted x 1.  Left message on voicemail with call back information and requested return call.  Plan: Care Coordinator . Care Coordinator will try to reach patient again in 7-10 days business days.  Senia Durán RN, Care Coordinator   [facility] -Care Coordination  Doug Christine  [phone]

## 2019-05-15 NOTE — PROGRESS NOTES
6 month [facility]    STM Recheck Visit     Diagnostic AHI: 33.3  PSG    Data only recheck     Assessment: Pt meeting objective benchmarks.     Action plan: pt to follow up per provider request       Device type: Bilevel  PAP settings: 16/8cm  H20   Objective measures: 14 day rolling measures      Compliance  71 %      Leak  29.24 lpm  last  upload      AHI 3.66   last  upload      Average number of minutes 285      Objective measure goal  Compliance   Goal >70%  Leak   Goal < 24 lpm  AHI  Goal < 5  Usage  Goal >240

## 2019-05-16 ENCOUNTER — TELEPHONE (OUTPATIENT)
Dept: SLEEP MEDICINE | Facility: CLINIC | Age: 74
End: 2019-05-16

## 2019-05-16 NOTE — TELEPHONE ENCOUNTER
Spoke with the patient on 5/16/2019 at 12:30 PM and informed patient that per our records we do not have an MANISHA appointment scheduled nor do we have a follow up scheduled. Patient ws asked to schedule and I was told that she has a headache and isn't able to schedule right now but will try to call back this afternoon.    Milagro Dye [facility] ~[place]

## 2019-05-20 ENCOUNTER — PATIENT OUTREACH (OUTPATIENT)
Dept: CARDIOLOGY | Facility: CLINIC | Age: 74
End: 2019-05-20

## 2019-05-20 DIAGNOSIS — I50.32 CHRONIC DIASTOLIC HEART FAILURE (H): Primary | Chronic | ICD-10-CM

## 2019-05-20 RX ORDER — LIDOCAINE 40 MG/G
CREAM TOPICAL
Status: CANCELLED | OUTPATIENT
Start: 2019-05-20

## 2019-05-22 ENCOUNTER — OFFICE VISIT (OUTPATIENT)
Dept: CARDIOLOGY | Facility: CLINIC | Age: 74
End: 2019-05-22
Attending: NURSE PRACTITIONER
Payer: MEDICARE

## 2019-05-22 VITALS
BODY MASS INDEX: 47.09 KG/M2 | HEIGHT: 66 IN | HEART RATE: 60 BPM | SYSTOLIC BLOOD PRESSURE: 120 MMHG | WEIGHT: 293 LBS | OXYGEN SATURATION: 99 % | DIASTOLIC BLOOD PRESSURE: 54 MMHG

## 2019-05-22 DIAGNOSIS — I50.32 CHRONIC DIASTOLIC HEART FAILURE (H): Chronic | ICD-10-CM

## 2019-05-22 DIAGNOSIS — Z79.4 TYPE 2 DIABETES MELLITUS WITH STAGE 3 CHRONIC KIDNEY DISEASE, WITH LONG-TERM CURRENT USE OF INSULIN (H): Chronic | ICD-10-CM

## 2019-05-22 DIAGNOSIS — N18.30 TYPE 2 DIABETES MELLITUS WITH STAGE 3 CHRONIC KIDNEY DISEASE, WITH LONG-TERM CURRENT USE OF INSULIN (H): Chronic | ICD-10-CM

## 2019-05-22 DIAGNOSIS — E11.22 TYPE 2 DIABETES MELLITUS WITH STAGE 3 CHRONIC KIDNEY DISEASE, WITH LONG-TERM CURRENT USE OF INSULIN (H): Chronic | ICD-10-CM

## 2019-05-22 LAB
ANION GAP SERPL CALCULATED.3IONS-SCNC: 7 MMOL/L (ref 3–14)
BUN SERPL-MCNC: 51 MG/DL (ref 7–30)
CALCIUM SERPL-MCNC: 9.9 MG/DL (ref 8.5–10.1)
CHLORIDE SERPL-SCNC: 102 MMOL/L (ref 94–109)
CO2 SERPL-SCNC: 27 MMOL/L (ref 20–32)
CREAT SERPL-MCNC: 1.8 MG/DL (ref 0.52–1.04)
CREAT UR-MCNC: 98 MG/DL
ERYTHROCYTE [DISTWIDTH] IN BLOOD BY AUTOMATED COUNT: 13.1 % (ref 10–15)
GFR SERPL CREATININE-BSD FRML MDRD: 27 ML/MIN/{1.73_M2}
GLUCOSE SERPL-MCNC: 141 MG/DL (ref 70–99)
HCT VFR BLD AUTO: 40.5 % (ref 35–47)
HGB BLD-MCNC: 12.8 G/DL (ref 11.7–15.7)
MCH RBC QN AUTO: 28.7 PG (ref 26.5–33)
MCHC RBC AUTO-ENTMCNC: 31.6 G/DL (ref 31.5–36.5)
MCV RBC AUTO: 91 FL (ref 78–100)
MICROALBUMIN UR-MCNC: 31 MG/L
MICROALBUMIN/CREAT UR: 31.53 MG/G CR (ref 0–25)
PLATELET # BLD AUTO: 101 10E9/L (ref 150–450)
POTASSIUM SERPL-SCNC: 4.6 MMOL/L (ref 3.4–5.3)
RBC # BLD AUTO: 4.46 10E12/L (ref 3.8–5.2)
SODIUM SERPL-SCNC: 136 MMOL/L (ref 133–144)
WBC # BLD AUTO: 8.6 10E9/L (ref 4–11)

## 2019-05-22 PROCEDURE — G0463 HOSPITAL OUTPT CLINIC VISIT: HCPCS | Mod: ZF

## 2019-05-22 PROCEDURE — 99214 OFFICE O/P EST MOD 30 MIN: CPT | Mod: ZP | Performed by: NURSE PRACTITIONER

## 2019-05-22 PROCEDURE — 36415 COLL VENOUS BLD VENIPUNCTURE: CPT | Performed by: NURSE PRACTITIONER

## 2019-05-22 PROCEDURE — 80048 BASIC METABOLIC PNL TOTAL CA: CPT | Performed by: NURSE PRACTITIONER

## 2019-05-22 PROCEDURE — 85027 COMPLETE CBC AUTOMATED: CPT

## 2019-05-22 RX ORDER — BUMETANIDE 1 MG/1
2 TABLET ORAL 2 TIMES DAILY
Qty: 360 TABLET | Refills: 3 | Status: SHIPPED | OUTPATIENT
Start: 2019-05-22 | End: 2019-08-20

## 2019-05-22 ASSESSMENT — MIFFLIN-ST. JEOR: SCORE: 1978.71

## 2019-05-22 ASSESSMENT — PAIN SCALES - GENERAL: PAINLEVEL: NO PAIN (0)

## 2019-05-22 NOTE — PATIENT INSTRUCTIONS
Take your medicines every day, as directed    Changes made today:  o Please increase Bumex to 2 mg twice daily     Monitor Your Weight and Symptoms    Contact us if you:      Gain 2 pounds in one day or 5 pounds in one week    Feel more short of breath    Notice more leg swelling    Feel lightheadeded   Change your lifestyle    Limit Salt or Sodium:    2000 mg  Limit Fluids:    2000 mL or approximately 64 ounces  Eat a Heart Healthy Diet    Low in saturated fats  Stay Active:    Aim to move at least 150 minutes every  week         To Contact us    During Business Hours:  [phone], option # 1 (University)  Then option # 4 (medical questions)     After hours, weekends or holidays:   [phone], Option #4  Ask to speak to the On-Call Cardiologist. Inform them you are a CORE/heart failure patient at the [facility].     Use [employer] allows you to communicate directly with your heart team through secure messaging.    [employer] can be accessed any time on your phone, computer, or tablet.    If you need assistance, we'd be happy to help!         Keep your Heart Appointments:    Please have more blood drawn today (CBC)    Please follow with your primary care doctor regarding bloody stools. We've also sent her a message    We'll postpone CardioMems implant until the bleeding is sorted out.
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
## 2019-05-22 NOTE — NURSING NOTE
Chief Complaint   Patient presents with     Follow Up     Return [facility], 74 yo female, HFpEF, labs prior.      Vitals were taken and medications were reconciled.     JOSE GUADALUPE Mcclellan  2:05 PM

## 2019-05-23 DIAGNOSIS — E11.22 TYPE 2 DIABETES MELLITUS WITH STAGE 3 CHRONIC KIDNEY DISEASE, WITH LONG-TERM CURRENT USE OF INSULIN (H): Chronic | ICD-10-CM

## 2019-05-23 DIAGNOSIS — Z79.4 TYPE 2 DIABETES MELLITUS WITH STAGE 3 CHRONIC KIDNEY DISEASE, WITH LONG-TERM CURRENT USE OF INSULIN (H): Chronic | ICD-10-CM

## 2019-05-23 DIAGNOSIS — N18.30 TYPE 2 DIABETES MELLITUS WITH STAGE 3 CHRONIC KIDNEY DISEASE, WITH LONG-TERM CURRENT USE OF INSULIN (H): Chronic | ICD-10-CM

## 2019-05-23 RX ORDER — REPAGLINIDE 1 MG/1
TABLET ORAL
Qty: 0.1 TABLET | Refills: 0 | OUTPATIENT
Start: 2019-05-23

## 2019-05-24 ENCOUNTER — TELEPHONE (OUTPATIENT)
Dept: CARDIOLOGY | Facility: CLINIC | Age: 74
End: 2019-05-24

## 2019-05-24 ENCOUNTER — OFFICE VISIT (OUTPATIENT)
Dept: ORTHOPEDICS | Facility: CLINIC | Age: 74
End: 2019-05-24
Attending: INTERNAL MEDICINE
Payer: MEDICARE

## 2019-05-24 VITALS — BODY MASS INDEX: 47.09 KG/M2 | WEIGHT: 293 LBS | HEIGHT: 66 IN

## 2019-05-24 DIAGNOSIS — Z79.4 TYPE 2 DIABETES MELLITUS WITH STAGE 3 CHRONIC KIDNEY DISEASE, WITH LONG-TERM CURRENT USE OF INSULIN (H): Chronic | ICD-10-CM

## 2019-05-24 DIAGNOSIS — N18.30 TYPE 2 DIABETES MELLITUS WITH STAGE 3 CHRONIC KIDNEY DISEASE, WITH LONG-TERM CURRENT USE OF INSULIN (H): Chronic | ICD-10-CM

## 2019-05-24 DIAGNOSIS — M21.42 PES PLANUS OF BOTH FEET: ICD-10-CM

## 2019-05-24 DIAGNOSIS — M21.41 PES PLANUS OF BOTH FEET: ICD-10-CM

## 2019-05-24 DIAGNOSIS — E11.22 TYPE 2 DIABETES MELLITUS WITH STAGE 3 CHRONIC KIDNEY DISEASE, WITH LONG-TERM CURRENT USE OF INSULIN (H): Chronic | ICD-10-CM

## 2019-05-24 DIAGNOSIS — L60.3 NAIL DYSTROPHY: ICD-10-CM

## 2019-05-24 DIAGNOSIS — M54.17 LUMBOSACRAL RADICULOPATHY: Primary | ICD-10-CM

## 2019-05-24 ASSESSMENT — MIFFLIN-ST. JEOR: SCORE: 1978.26
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
## 2019-05-24 NOTE — NURSING NOTE
"Reason For Visit:   Chief Complaint   Patient presents with     Consult     diabetic foot exam - ingrown great toe nail        Ht 1.676 m (5' 6\")   Wt 145.2 kg (320 lb)   BMI 51.65 kg/m      Pain Assessment  Patient Currently in Pain: Srikanthies    Louisa Boston ATC    "

## 2019-05-24 NOTE — TELEPHONE ENCOUNTER
5.24.19 2:04 pm: Called pt to check symptoms of gregory blood in stool. Pt is scheduled for CardioMEMs implant. Reached VM, left message requesting return call. Agatha Moss RN CORE Care Coordinator
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
## 2019-05-24 NOTE — TELEPHONE ENCOUNTER
"Requested Prescriptions   Pending Prescriptions Disp Refills     repaglinide (PRANDIN) 1 MG tablet [Pharmacy Med Name: REPAGLINIDE 1MG TABLETS] 90 tablet 0     Sig: TAKE 1 TABLET(1 MG) BY MOUTH THREE TIMES DAILY BEFORE MEALS       Meglitinide Agents Passed - 5/23/2019  5:33 PM        Passed - Blood pressure less than 140/90 in past 6 months     BP Readings from Last 3 Encounters:   05/22/19 120/54   05/08/19 142/67   05/07/19 114/61                 Passed - Patient has documented LDL within the past 12 mos.     Recent Labs   Lab Test 03/13/19  1508   LDL 76             Passed - Patient has a recent ALT within the past 12 mos.     Recent Labs   Lab Test 04/09/19 2050   ALT 22             Passed - Patient has had a Microalbumin in the past 12 mos.     Recent Labs   Lab Test 05/22/19  1530   MICROL 31   UMALCR 31.53*             Passed - HgbA1C in past 3 or 6 months     If HgbA1C is 8 or greater, it needs to be on file within the past 3 months.  If less than 8, must be on file within the past 6 months.     Recent Labs   Lab Test 04/30/19  0658 04/03/19   A1C 6.5*  --    HEMOGLOBINA1  --  6.5*             Passed - Patient does not have a diagnosis of CHF        Passed - Medication is active on med list        Passed - Patient is age 18 or older        Passed - Patient is not pregnant        Passed - Patient has not had a positive pregnancy test within the past 12 mos.        Passed - Recent (6 mo) or future (30 days) visit within the authorizing provider's specialty     Patient had office visit in the last 6 months or has a visit in the next 30 days with authorizing provider or within the authorizing provider's specialty.  See \"Patient Info\" tab in inbasket, or \"Choose Columns\" in Meds & Orders section of the refill encounter.            Passed - Patient has a recent AST within the past 12 mos.      Recent Labs   Lab Test 04/09/19 2050   AST 20             Refused Prescriptions:                       Disp   Refills    " repaglinide (PRANDIN) 1 MG tablet [Pharmac*0.1 ta*0        Sig: TAKE 1 TABLET(1 MG) BY MOUTH THREE TIMES DAILY BEFORE           MEALS  Refused By: ABIMBOLA ANN  Reason for Refusal: Duplicate

## 2019-05-28 ENCOUNTER — TELEPHONE (OUTPATIENT)
Dept: CARDIOLOGY | Facility: CLINIC | Age: 74
End: 2019-05-28

## 2019-05-28 ENCOUNTER — TELEPHONE (OUTPATIENT)
Dept: FAMILY MEDICINE | Facility: CLINIC | Age: 74
End: 2019-05-28

## 2019-05-28 DIAGNOSIS — M54.17 LUMBOSACRAL RADICULOPATHY: Primary | ICD-10-CM

## 2019-05-28 DIAGNOSIS — F33.41 RECURRENT MAJOR DEPRESSIVE DISORDER, IN PARTIAL REMISSION (H): ICD-10-CM

## 2019-05-28 NOTE — TELEPHONE ENCOUNTER
"Requested Prescriptions   Pending Prescriptions Disp Refills     escitalopram (LEXAPRO) 20 MG tablet  Last Written Prescription Date:  05/03/2019  Last Fill Quantity: 30 tablet,  # refills: 0   Last Office Visit: 4/10/2019   Future Office Visit:     MAY BE TOO SOON FOR REFILL 30 tablet 0     Sig: Take 1 tablet (20 mg) by mouth every morning         SSRIs Protocol Failed - 5/28/2019 10:02 AM          Failed - PHQ-9 score less than 5 in past 6 months     Please review last PHQ-9 score.   PHQ-9 SCORE 3/19/2018 8/23/2018 2/15/2019   PHQ-9 Total Score - - -   PHQ-9 Total Score MyChart - - -   PHQ-9 Total Score 15 13 15     XU-7 SCORE 1/20/2017 3/19/2018 8/23/2018   Total Score - - -   Total Score 10 6 0           Passed - Medication is active on med list        Passed - Patient is age 18 or older        Passed - No active pregnancy on record        Passed - No positive pregnancy test in last 12 months        Passed - Recent (6 mo) or future (30 days) visit within the authorizing provider's specialty     Patient had office visit in the last 6 months or has a visit in the next 30 days with authorizing provider or within the authorizing provider's specialty.  See \"Patient Info\" tab in inbasket, or \"Choose Columns\" in Meds & Orders section of the refill encounter.              "

## 2019-05-28 NOTE — TELEPHONE ENCOUNTER
[facility] Call [facility]    Phone Message    May a detailed message be left on voicemail: yes    Reason for Call: Other: Returning call to clinic to discuss upcoming MEM procedure.  Mariel will not be available until after 4pm today to speak with someone at length.     Action Taken: Message routed to:  [facility] cardiology

## 2019-05-28 NOTE — TELEPHONE ENCOUNTER
RECORDS RECEIVED FROM: Back Pain   DATE RECEIVED: May 31, 2019    NOTES STATUS DETAILS   OFFICE NOTE from referring provider Internal Dr. Fitzgerald 5/24/19   OFFICE NOTE from other specialist N/A    DISCHARGE SUMMARY from hospital N/A    DISCHARGE REPORT from the ER N/A    OPERATIVE REPORT N/A    MEDICATION LIST Internal    IMPLANT RECORD/STICKER N/A    LABS     CBC/DIFF N/A    CULTURES N/A    INJECTIONS DONE IN RADIOLOGY N/A    MRI Internal 9/9/18   CT SCAN Internal 11/11/16   XRAYS (IMAGES & REPORTS) Internal Scheduled for May 31, 2019    TUMOR     PATHOLOGY  Slides & report N/A

## 2019-05-28 NOTE — TELEPHONE ENCOUNTER
Reason for Call:  Other call back    Detailed comments:  Pt states she would really like to speak to the provider about her cardio test .     Phone Number Patient can be reached at: Home number on file [phone] (home)    Best Time: asap    Can we leave a detailed message on this number? YES    Call taken on 5/28/2019 at 9:34 AM by Gaye Conte

## 2019-05-28 NOTE — TELEPHONE ENCOUNTER
Pt scheduled for heart cath on 6/13/19.     She has some unexpected bleeding from her rectum today and it happened about one week ago. So she wants to see Dr William. Bright red bleeding, she has never had this before. Blood filled the whole water bowl with red. She has had some constipation and had to push to get BM out. Some pain with BM but not now. No weakness or faint or dizzy out of the ordinary. She does take stool softener twice daily when gets constipated so has been doing this.     If weak, faint , dizzy, lots of bleeding to ED immediately. Pt given appt in hold spot with PCP tomorrow. She was advised to do all she can to avoid constipation, fresh fruits, veggies, extra water.    Candace Tello, RN, BSN

## 2019-05-28 NOTE — TELEPHONE ENCOUNTER
Left message to call on 5/28/2019  at 2:48 PM and informed pt to call back to  [phone] and schedule her MANISHA and follow up.    Milagro Dye [facility] ~[place]

## 2019-05-29 ENCOUNTER — TELEPHONE (OUTPATIENT)
Dept: FAMILY MEDICINE | Facility: CLINIC | Age: 74
End: 2019-05-29

## 2019-05-29 ENCOUNTER — OFFICE VISIT (OUTPATIENT)
Dept: FAMILY MEDICINE | Facility: CLINIC | Age: 74
End: 2019-05-29
Payer: MEDICARE

## 2019-05-29 VITALS
DIASTOLIC BLOOD PRESSURE: 57 MMHG | RESPIRATION RATE: 16 BRPM | TEMPERATURE: 97 F | OXYGEN SATURATION: 99 % | HEART RATE: 62 BPM | SYSTOLIC BLOOD PRESSURE: 140 MMHG

## 2019-05-29 DIAGNOSIS — K60.2 ANAL FISSURE: Primary | ICD-10-CM

## 2019-05-29 PROCEDURE — 99213 OFFICE O/P EST LOW 20 MIN: CPT | Performed by: FAMILY MEDICINE

## 2019-05-29 RX ORDER — LOSARTAN POTASSIUM 50 MG/1
TABLET ORAL
Qty: 135 TABLET | Refills: 3
Start: 2019-05-29 | End: 2019-10-30

## 2019-05-29 RX ORDER — REPAGLINIDE 0.5 MG/1
0.5 TABLET ORAL
Qty: 270 TABLET | Refills: 3 | Status: SHIPPED | OUTPATIENT
Start: 2019-05-29 | End: 2019-10-31

## 2019-05-29 RX ORDER — METOPROLOL TARTRATE 50 MG
50 TABLET ORAL 2 TIMES DAILY
Qty: 180 TABLET | Refills: 3 | Status: SHIPPED | OUTPATIENT
Start: 2019-05-29 | End: 2019-12-03

## 2019-05-29 NOTE — TELEPHONE ENCOUNTER
"Mariel returned my call today to discuss Cardiomems placement.  She is currently on the schedule for 6/13 but has had 2 episodes of BRBPR recently.  She reports that the blood appears to be on the outside of the stool rather than mixed in the stool.  She will see her PCP this afternoon but is wondering if she has hemorrhoids.  Mariel also asked which route is most commonly used for cardiomems placement.  She hoped that they could go through the wrist since she has\" 2 active, open, infected sores\" in her groins that are currently being treated that have been present since her open heart surgery.  I will discuss with her providers and watch for her PCP's assessment and follow up with Mariel.    Latonia Hdz RN    "

## 2019-05-30 ENCOUNTER — TELEPHONE (OUTPATIENT)
Dept: ENDOCRINOLOGY | Facility: CLINIC | Age: 74
End: 2019-05-30

## 2019-05-30 NOTE — TELEPHONE ENCOUNTER
I already reached  Mariel back on lantus dosing  To 22 units daily . Sonya Horne RN on 5/30/2019 at 1:25 PM

## 2019-05-30 NOTE — TELEPHONE ENCOUNTER
[facility] Call [facility]    Phone Message    May a detailed message be left on voicemail: yes    Reason for Call: Other: Pt  states that she is retuning Sonya's ph call from yesterday. Unsure of what phone call could be in regards to. Please f/u.     Action Taken: Message routed to:  [facility] endo

## 2019-05-30 NOTE — TELEPHONE ENCOUNTER
Dr. Connell review RX Lexapro and consider refills .  You just saw patient yesterday.  The dosing has a cautionary box. Will let you evaluate.   Thank you.  Monica Gonzalez RN

## 2019-05-30 NOTE — TELEPHONE ENCOUNTER
Mariel was notified to increase the lantus to 22 units once daily . She gave verbal understanding to this and will call us if the numbers will not go  down. .Sonya Horne RN on 5/30/2019 at 12:55 PM

## 2019-05-30 NOTE — TELEPHONE ENCOUNTER
----- Message from Muriel Will PA-C sent at 5/30/2019 12:40 PM CDT -----  Regarding: RE: BG none over 300   Since none are >300, she should not increase to 25 units daily, but she should increase to 22 units daily.  Thank you!  ----- Message -----  From: Sonya Horne RN  Sent: 5/29/2019   6:18 PM  To: Muriel Will PA-C  Subject: BG none over 300                                 She was told to use lantus  20 units daily since  5/8/19  Prandin 1 mg  Breakfast and Dinner  ( cannot split in half)   5/29/19 =   5/28/19 = post lunch right after  230 =   5/27/19  =HS  242  5/26/19  = 132 pre dinner= 243 HS   5/25/19  Dinner  214  5/24/19  = 135 pre dinner    5/23/19  = dinner 334= 174 HS   What should she do knowing these numbers ? Isn't 25 units lantus too much ?  ----- Message -----  From: Muriel Will PA-C  Sent: 5/29/2019  10:44 AM  To: Sonya Horne, JODY, Shira Cruz RN  Subject: Please call patient re high BG                   Hi!  I received a VM from Mariel reporting a BG of 334 and concern too high as I had advised  - 200, closer to 100 in morning, hopefully <200 by evening.   I got her voicemail.   I saw her 5/7 and will see her back 6/14 - not sure if she didn't;t switch from Lantus to NPH until now or hasn't been checking.  Hopefully you can reach her and she has more values. If all BG is high since the switch please advise 10 increase to 22 units once daily.  However if most are >300 and none <120, please increase to 25 units daily and have her call with fasting and qhs values by Friday morning to see about further titration.    Thank you!  Muriel  [phone] p /[phone] c (for you not patient of course)
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
## 2019-05-31 ENCOUNTER — TELEPHONE (OUTPATIENT)
Dept: FAMILY MEDICINE | Facility: CLINIC | Age: 74
End: 2019-05-31

## 2019-05-31 ENCOUNTER — DOCUMENTATION ONLY (OUTPATIENT)
Dept: CARE COORDINATION | Facility: CLINIC | Age: 74
End: 2019-05-31

## 2019-05-31 ENCOUNTER — OFFICE VISIT (OUTPATIENT)
Dept: ORTHOPEDICS | Facility: CLINIC | Age: 74
End: 2019-05-31
Attending: PODIATRIST
Payer: MEDICARE

## 2019-05-31 ENCOUNTER — ANCILLARY PROCEDURE (OUTPATIENT)
Dept: GENERAL RADIOLOGY | Facility: CLINIC | Age: 74
End: 2019-05-31
Attending: ORTHOPAEDIC SURGERY
Payer: MEDICARE

## 2019-05-31 ENCOUNTER — PRE VISIT (OUTPATIENT)
Dept: ORTHOPEDICS | Facility: CLINIC | Age: 74
End: 2019-05-31

## 2019-05-31 VITALS — WEIGHT: 293 LBS | BODY MASS INDEX: 47.09 KG/M2 | HEIGHT: 66 IN

## 2019-05-31 DIAGNOSIS — M54.17 LUMBOSACRAL RADICULOPATHY: Primary | ICD-10-CM

## 2019-05-31 RX ORDER — ESCITALOPRAM OXALATE 20 MG/1
20 TABLET ORAL EVERY MORNING
Qty: 90 TABLET | Refills: 1 | Status: SHIPPED | OUTPATIENT
Start: 2019-05-31 | End: 2019-12-05

## 2019-05-31 ASSESSMENT — MIFFLIN-ST. JEOR: SCORE: 1973.26
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
## 2019-05-31 NOTE — TELEPHONE ENCOUNTER
I called and left a message for Gwendolyn approving skilled nursing orders 3 x week for 2 weeks, 2 x week for 3 weeks, and 3 as needed visit. Also, home health aid 1 x week for 5 weeks. I asked her to call back if questions.  Hetal Mane RN
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
## 2019-05-31 NOTE — TELEPHONE ENCOUNTER
Reason for Call: Request for an order or referral:    Order or referral being requested: Skilled Nursing    Date needed: as soon as possible    Has the patient been seen by the PCP for this problem? Not Applicable    Additional comments: [facility] is requesting skilled nursing orders 3 x week for 2 weeks, 2 x week for 3 weeks, and 3 PRN. Also, home health aid 1 x week for 5 weeks. States patient reported a fall 5/30/2019 AM onto her knees but denied injury. Please follow up. Thanks!    Phone number Patient can be reached at:  Other phone number: [phone]    Best Time:  Any    Can we leave a detailed message on this number?  YES    Call taken on 5/31/2019 at 11:40 AM by Korin Castro

## 2019-05-31 NOTE — PROGRESS NOTES
[facility] now requests orders and shares plan of care/discharge summaries for some patients through [employer].  Please REPLY TO THIS MESSAGE OR ROUTE BACK TO THE AUTHOR in order to give authorization for orders when needed.  This is considered a verbal order, you will still receive a faxed copy of orders for signature.  Thank you for your assistance in improving collaboration for our patients.     Requested order:  Effective 6/2/19, cont. PT treat 2 times/week for 1 week then 1 time/week for 2 weeks for gait, balance, transfer, stair training, therapeutic exercise for low back pain and LE strengthening, and home exercise instruction.    Also FYI:  Pt. Reports she had a fall evening of 5/30/19 at 3 pm while attempting to rise from bed with posterior loss of balance resulting to floor, c/o residual L shoulder and low back pain after strain of floor to bed transfer d/t she was home alone at the time.   noted 2 cm x 4 cm purple bruise on R posterior hip, no other injuries noted    Radha Roa PT  [facility]  [email or web address]  [phone]

## 2019-06-03 DIAGNOSIS — E11.22 TYPE 2 DIABETES MELLITUS WITH STAGE 3 CHRONIC KIDNEY DISEASE, WITH LONG-TERM CURRENT USE OF INSULIN (H): Chronic | ICD-10-CM

## 2019-06-03 DIAGNOSIS — N18.30 TYPE 2 DIABETES MELLITUS WITH STAGE 3 CHRONIC KIDNEY DISEASE, WITH LONG-TERM CURRENT USE OF INSULIN (H): Chronic | ICD-10-CM

## 2019-06-03 DIAGNOSIS — Z79.4 TYPE 2 DIABETES MELLITUS WITH STAGE 3 CHRONIC KIDNEY DISEASE, WITH LONG-TERM CURRENT USE OF INSULIN (H): Chronic | ICD-10-CM

## 2019-06-03 NOTE — TELEPHONE ENCOUNTER
insulin isophane human (HUMULIN N KWIKPEN) 100 UNIT/ML injection        Last Written Prescription Date:  12/06/18  Last Fill Quantity: 15mL,   # refills: 1  Last Office Visit : 05/07/19  Future Office visit:  06/13/19    Routing refill request to provider for review/approval because:  Insulin - refilled per clinic

## 2019-06-05 ENCOUNTER — PATIENT OUTREACH (OUTPATIENT)
Dept: CARE COORDINATION | Facility: CLINIC | Age: 74
End: 2019-06-05

## 2019-06-05 DIAGNOSIS — E11.9 DIABETES MELLITUS, TYPE 2 (H): ICD-10-CM

## 2019-06-05 DIAGNOSIS — R51.9 HEADACHE: Primary | ICD-10-CM

## 2019-06-05 ASSESSMENT — ACTIVITIES OF DAILY LIVING (ADL): DEPENDENT_IADLS:: INDEPENDENT

## 2019-06-06 ENCOUNTER — DOCUMENTATION ONLY (OUTPATIENT)
Dept: CARE COORDINATION | Facility: CLINIC | Age: 74
End: 2019-06-06

## 2019-06-06 NOTE — PROGRESS NOTES
[facility] now requests orders and shares plan of care/discharge summaries for some patients through [employer].  Please REPLY TO THIS MESSAGE OR ROUTE BACK TO THE AUTHOR in order to give authorization for orders when needed.  This is considered a verbal order, you will still receive a faxed copy of orders for signature.  Thank you for your assistance in improving collaboration for our patients.    ORDER    OT to continue 1w3 for ADL/IADL safety, DME needs, cognitive and low vision strategies.    Elza Willoughby, OTR/L  [phone]

## 2019-06-13 ENCOUNTER — APPOINTMENT (OUTPATIENT)
Dept: MEDSURG UNIT | Facility: CLINIC | Age: 74
End: 2019-06-13
Attending: INTERNAL MEDICINE
Payer: MEDICARE

## 2019-06-13 ENCOUNTER — HOSPITAL ENCOUNTER (OUTPATIENT)
Facility: CLINIC | Age: 74
Discharge: HOME | End: 2019-06-13
Attending: INTERNAL MEDICINE | Admitting: INTERNAL MEDICINE
Payer: MEDICARE

## 2019-06-13 ENCOUNTER — APPOINTMENT (OUTPATIENT)
Dept: LAB | Facility: CLINIC | Age: 74
End: 2019-06-13
Attending: INTERNAL MEDICINE
Payer: MEDICARE

## 2019-06-13 VITALS
RESPIRATION RATE: 18 BRPM | TEMPERATURE: 97.4 F | DIASTOLIC BLOOD PRESSURE: 45 MMHG | OXYGEN SATURATION: 98 % | SYSTOLIC BLOOD PRESSURE: 126 MMHG | BODY MASS INDEX: 47.09 KG/M2 | HEIGHT: 66 IN | HEART RATE: 57 BPM | WEIGHT: 293 LBS

## 2019-06-13 DIAGNOSIS — T78.40XD ALLERGIC REACTION, SUBSEQUENT ENCOUNTER: Primary | ICD-10-CM

## 2019-06-13 DIAGNOSIS — I50.32 CHRONIC DIASTOLIC HEART FAILURE (H): ICD-10-CM

## 2019-06-13 DIAGNOSIS — I50.32 CHRONIC DIASTOLIC HEART FAILURE (H): Primary | Chronic | ICD-10-CM

## 2019-06-13 LAB
ABO + RH BLD: NORMAL
ABO + RH BLD: NORMAL
ANION GAP SERPL CALCULATED.3IONS-SCNC: 5 MMOL/L (ref 3–14)
BASOPHILS # BLD AUTO: 0 10E9/L (ref 0–0.2)
BASOPHILS NFR BLD AUTO: 0.6 %
BLD GP AB SCN SERPL QL: NORMAL
BLOOD BANK CMNT PATIENT-IMP: NORMAL
BUN SERPL-MCNC: 37 MG/DL (ref 7–30)
CALCIUM SERPL-MCNC: 9.6 MG/DL (ref 8.5–10.1)
CHLORIDE SERPL-SCNC: 106 MMOL/L (ref 94–109)
CO2 SERPL-SCNC: 29 MMOL/L (ref 20–32)
CREAT SERPL-MCNC: 1.73 MG/DL (ref 0.52–1.04)
DIFFERENTIAL METHOD BLD: ABNORMAL
EOSINOPHIL # BLD AUTO: 0.2 10E9/L (ref 0–0.7)
EOSINOPHIL NFR BLD AUTO: 2.6 %
ERYTHROCYTE [DISTWIDTH] IN BLOOD BY AUTOMATED COUNT: 13 % (ref 10–15)
GFR SERPL CREATININE-BSD FRML MDRD: 29 ML/MIN/{1.73_M2}
GLUCOSE SERPL-MCNC: 140 MG/DL (ref 70–99)
HCT VFR BLD AUTO: 38.7 % (ref 35–47)
HGB BLD-MCNC: 11.8 G/DL (ref 11.7–15.7)
IMM GRANULOCYTES # BLD: 0 10E9/L (ref 0–0.4)
IMM GRANULOCYTES NFR BLD: 0.3 %
LYMPHOCYTES # BLD AUTO: 1.4 10E9/L (ref 0.8–5.3)
LYMPHOCYTES NFR BLD AUTO: 21.5 %
MCH RBC QN AUTO: 28.6 PG (ref 26.5–33)
MCHC RBC AUTO-ENTMCNC: 30.5 G/DL (ref 31.5–36.5)
MCV RBC AUTO: 94 FL (ref 78–100)
MONOCYTES # BLD AUTO: 0.6 10E9/L (ref 0–1.3)
MONOCYTES NFR BLD AUTO: 8.8 %
NEUTROPHILS # BLD AUTO: 4.3 10E9/L (ref 1.6–8.3)
NEUTROPHILS NFR BLD AUTO: 66.2 %
NRBC # BLD AUTO: 0 10*3/UL
NRBC BLD AUTO-RTO: 0 /100
PLATELET # BLD AUTO: 119 10E9/L (ref 150–450)
POTASSIUM SERPL-SCNC: 4.7 MMOL/L (ref 3.4–5.3)
RBC # BLD AUTO: 4.13 10E12/L (ref 3.8–5.2)
SODIUM SERPL-SCNC: 140 MMOL/L (ref 133–144)
SPECIMEN EXP DATE BLD: NORMAL
WBC # BLD AUTO: 6.5 10E9/L (ref 4–11)

## 2019-06-13 PROCEDURE — 80048 BASIC METABOLIC PNL TOTAL CA: CPT | Performed by: INTERNAL MEDICINE

## 2019-06-13 PROCEDURE — 40000172 ZZH STATISTIC PROCEDURE PREP ONLY

## 2019-06-13 PROCEDURE — 85025 COMPLETE CBC W/AUTO DIFF WBC: CPT | Performed by: INTERNAL MEDICINE

## 2019-06-13 PROCEDURE — 86850 RBC ANTIBODY SCREEN: CPT | Performed by: INTERNAL MEDICINE

## 2019-06-13 PROCEDURE — 36415 COLL VENOUS BLD VENIPUNCTURE: CPT | Performed by: INTERNAL MEDICINE

## 2019-06-13 PROCEDURE — 86900 BLOOD TYPING SEROLOGIC ABO: CPT | Performed by: INTERNAL MEDICINE

## 2019-06-13 PROCEDURE — 25000125 ZZHC RX 250: Performed by: INTERNAL MEDICINE

## 2019-06-13 PROCEDURE — 86901 BLOOD TYPING SEROLOGIC RH(D): CPT | Performed by: INTERNAL MEDICINE

## 2019-06-13 RX ORDER — PREDNISONE 50 MG/1
50 TABLET ORAL DAILY
Qty: 2 TABLET | Refills: 0 | Status: SHIPPED | OUTPATIENT
Start: 2019-06-13 | End: 2019-09-05

## 2019-06-13 RX ORDER — LIDOCAINE 40 MG/G
CREAM TOPICAL
Status: COMPLETED | OUTPATIENT
Start: 2019-06-13 | End: 2019-06-13

## 2019-06-13 RX ORDER — DIPHENHYDRAMINE HCL 50 MG
50 CAPSULE ORAL SEE ADMIN INSTRUCTIONS
Qty: 2 CAPSULE | Refills: 0 | Status: SHIPPED | OUTPATIENT
Start: 2019-06-13 | End: 2019-10-15

## 2019-06-13 RX ORDER — LIDOCAINE 40 MG/G
CREAM TOPICAL
Status: DISCONTINUED | OUTPATIENT
Start: 2019-06-13 | End: 2019-06-18 | Stop reason: HOSPADM

## 2019-06-13 RX ADMIN — LIDOCAINE: 40 CREAM TOPICAL at 10:46

## 2019-06-13 ASSESSMENT — MIFFLIN-ST. JEOR: SCORE: 1971.75

## 2019-06-13 NOTE — IP AVS SNAPSHOT
MRN:[id number]                      After Visit Summary   6/13/2019    Mariel Ribeiro    MRN: [id number]           Visit Information        Department      6/13/2019  9:28 AM [address] [facility]          Review of your medicines      UNREVIEWED medicines. Ask your doctor about these medicines       Dose / Directions   acetaminophen 325 MG tablet  Commonly known as:  TYLENOL  Used for:  S/P CABG x 3, Acute post-operative pain      Dose:  650 mg  Take 650 mg by mouth every 4 hours as needed for mild pain Take 2 tablets by mouth every 4 hours as needed for mild pain  Quantity:  100 tablet  Refills:  0     aspirin 81 MG EC tablet  Commonly known as:  ASA  Used for:  (HFpEF) heart failure with preserved ejection fraction (H)      Dose:  81 mg  Take 1 tablet (81 mg) by mouth daily  Refills:  0     atorvastatin 40 MG tablet  Commonly known as:  LIPITOR  Used for:  Atherosclerosis of native coronary artery without angina pectoris, unspecified whether native or transplanted heart      Dose:  40 mg  Take 1 tablet (40 mg) by mouth daily  Quantity:  30 tablet  Refills:  0     bumetanide 1 MG tablet  Commonly known as:  BUMEX  Used for:  Chronic diastolic heart failure (H)      Dose:  2 mg  Take 2 tablets (2 mg) by mouth 2 times daily Or as directed by CORE  Quantity:  360 tablet  Refills:  3     EPINEPHrine 0.3 MG/0.3ML injection 2-pack  Commonly known as:  EPIPEN/ADRENACLICK/or ANY BX GENERIC EQUIV  Used for:  Allergic reaction, subsequent encounter      Dose:  0.3 mg  Inject 0.3 mLs (0.3 mg) into the muscle once as needed for anaphylaxis  Quantity:  0.6 mL  Refills:  0     escitalopram 20 MG tablet  Commonly known as:  LEXAPRO  Used for:  Recurrent major depressive disorder, in partial remission (H)      Dose:  20 mg  Take 1 tablet (20 mg) by mouth every morning  Quantity:  90 tablet  Refills:  1     ferrous gluconate 324 (38 Fe) MG tablet  Commonly known as:  FERGON  Used for:  Chronic diastolic heart  failure (H), Iron deficiency anemia secondary to inadequate dietary iron intake      Dose:  324 mg  Take 1 tablet (324 mg) by mouth Every Mon, Wed, Fri Morning  Quantity:  36 tablet  Refills:  3     folic acid 1 MG tablet  Commonly known as:  FOLVITE  Used for:  (HFpEF) heart failure with preserved ejection fraction (H)      Dose:  2 mg  Take 2 tablets (2 mg) by mouth daily  Refills:  0     guaiFENesin 600 MG 12 hr tablet  Commonly known as:  MUCINEX  Used for:  (HFpEF) heart failure with preserved ejection fraction (H)      Dose:  600 mg  Take 1 tablet (600 mg) by mouth 2 times daily  Refills:  0     insulin glargine 100 UNIT/ML pen  Commonly known as:  LANTUS SOLOSTAR  Used for:  Type 2 diabetes mellitus with stage 3 chronic kidney disease, with long-term current use of insulin (H)      Dose:  20 Units  Inject 20 Units Subcutaneous daily Call if blood sugar <70, often >200.  Quantity:  15 mL  Refills:  0     levothyroxine 150 MCG tablet  Commonly known as:  SYNTHROID/LEVOTHROID  Used for:  Other specified hypothyroidism      Dose:  150 mcg  Take 1 tablet (150 mcg) by mouth daily  Quantity:  30 tablet  Refills:  0     losartan 50 MG tablet  Commonly known as:  COZAAR      Take 1 tablet (50 mg) by mouth every morning AND 0.5 tablets (25 mg) every evening.  Quantity:  135 tablet  Refills:  3     LYRICA 100 MG capsule  Used for:  Pain in joint of left shoulder  Generic drug:  pregabalin      Dose:  100 mg  Take 1 capsule (100 mg) by mouth 2 times daily  Quantity:  180 capsule  Refills:  3     metoprolol tartrate 50 MG tablet  Commonly known as:  LOPRESSOR      Dose:  50 mg  Take 1 tablet (50 mg) by mouth 2 times daily  Quantity:  180 tablet  Refills:  3     niacin  MG CR tablet  Commonly known as:  NIASPAN  Used for:  (HFpEF) heart failure with preserved ejection fraction (H)      Dose:  500 mg  Take 1 tablet (500 mg) by mouth daily  Refills:  9     NITROSTAT 0.4 MG sublingual tablet  Used for:  Atherosclerosis  of native coronary artery without angina pectoris, unspecified whether native or transplanted heart  Generic drug:  nitroGLYcerin      For chest pain place 1 tablet under the tongue every 5 minutes for 3 doses. If symptoms persist 5 minutes after 1st dose call 911.  Quantity:  25 tablet  Refills:  0     potassium chloride ER 10 MEQ CR tablet  Commonly known as:  K-DUR/KLOR-CON M  Used for:  Chronic diastolic heart failure (H)      Dose:  20 mEq  Take 2 tablets (20 mEq) by mouth 3 times daily  Quantity:  180 tablet  Refills:  3     PROAIR  (90 Base) MCG/ACT inhaler  Used for:  Chronic obstructive pulmonary disease, unspecified COPD type (H)  Generic drug:  albuterol      Dose:  1-2 puff  Inhale 1-2 puffs into the lungs every 4 hours as needed for wheezing  Quantity:  8.5 g  Refills:  0     repaglinide 0.5 MG tablet  Commonly known as:  PRANDIN      Dose:  0.5 mg  Take 1 tablet (0.5 mg) by mouth 3 times daily (before meals)  Quantity:  270 tablet  Refills:  3     senna-docusate 8.6-50 MG tablet  Commonly known as:  SENOKOT-S/PERICOLACE  Used for:  Atherosclerosis of native coronary artery without angina pectoris, unspecified whether native or transplanted heart      Dose:  1-2 tablet  Take 1-2 tablets by mouth 2 times daily as needed for constipation  Quantity:  30 tablet  Refills:  0     traZODone 50 MG tablet  Commonly known as:  DESYREL  Used for:  Insomnia, unspecified type      TAKE 3 TABLETS(150 MG) BY MOUTH AT BEDTIME  Quantity:  270 tablet  Refills:  1     vitamin D3 96763 units (250 mcg) capsule  Commonly known as:  CHOLECALCIFEROL  Used for:  (HFpEF) heart failure with preserved ejection fraction (H)      Dose:  1 capsule  Take 1 capsule (10,000 Units) by mouth daily  Refills:  0        START taking       Dose / Directions   diphenhydrAMINE 50 MG capsule  Commonly known as:  BENADRYL  Used for:  Chronic diastolic heart failure (H), Allergic reaction, subsequent encounter      Dose:  50 mg  Take 1  capsule (50 mg) by mouth See Admin Instructions for 2 doses  Quantity:  2 capsule  Refills:  0     predniSONE 50 MG tablet  Commonly known as:  DELTASONE  Used for:  Chronic diastolic heart failure (H), Allergic reaction, subsequent encounter      Dose:  50 mg  Take 1 tablet (50 mg) by mouth daily for 2 doses  Quantity:  2 tablet  Refills:  0        CONTINUE these medicines which have NOT CHANGED       Dose / Directions   blood glucose lancets  Used for:  Type 2 diabetes mellitus with diabetic polyneuropathy, without long-term current use of insulin (H)      Use to test blood sugars 2 times daily or as directed.  Quantity:  100 each  Refills:  prn     blood glucose test strip  Commonly known as:  NO BRAND SPECIFIED  Used for:  Type 2 diabetes mellitus with diabetic polyneuropathy, without long-term current use of insulin (H)      Dose:  1 strip  1 strip by In Vitro route 4 times daily Strips per patient's preference  Quantity:  400 each  Refills:  1     * ONE TOUCH ULTRA (DEVICES) Misc  Used for:  TYPE 2 DIABETES      test blood sugar BID  Quantity:  1  Refills:  0     * blood glucose monitoring meter device kit  Commonly known as:  NO BRAND SPECIFIED  Used for:  Type 2 diabetes mellitus with stage 3 chronic kidney disease, with long-term current use of insulin (H)      Use to test blood sugar four times daily or as directed.  Quantity:  1 kit  Refills:  0         * This list has 2 medication(s) that are the same as other medications prescribed for you. Read the directions carefully, and ask your doctor or other care provider to review them with you.               Where to get your medicines      Some of these will need a paper prescription and others can be bought over the counter. Ask your nurse if you have questions.    Bring a paper prescription for each of these medications  diphenhydrAMINE 50 MG capsule  predniSONE 50 MG tablet           Prescriptions were sent or printed at these locations (2 Prescriptions)             [id number] - [place], [place] - [address] AT [facility] [address]   [address], [place] [facility] [address]    Telephone:  [phone]   Fax:  [phone]   Hours:                  Printed at Department/Unit printer (2 of 2)         diphenhydrAMINE (BENADRYL) 50 MG capsule               predniSONE (DELTASONE) 50 MG tablet                Protect others around you: Learn how to safely use, store and throw away your medicines at www.disposemymeds.org.       Follow-ups after your visit       Your next 10 appointments already scheduled    Jun 26, 2019  3:00 PM CDT  Lab with  LAB  [facility] Lab ([facility]) [address] [place] [address]  1st [facility] [address] [phone]      Jun 26, 2019  3:30 PM CDT  (Arrive by 3:15 PM)  RETURN HEART FAILURE with Stephanie Wolf MD  [facility] Heart Care ([facility]) [address] [place] [address]  [address]  [facility] [address] [phone]      Aug 09, 2019  1:00 PM CDT  (Arrive by 12:45 PM)  RETURN DIABETES with Odalis Medley PA-C  [facility] Endocrinology ([facility]) [address] [place] [address]  3rd Floor  [facility] [address] [phone]      Aug 27, 2019  1:00 PM CDT  (Arrive by 12:45 PM)  RETURN FOOT/ANKLE with Bharat Fitzgerald DPM  [facility] ([facility]) [address] [place] [address]  4th Floor  [facility] [address] [phone]         Care Instructions       Further instructions from your care team       Mariel,     I will forward a message on to the heart failure clinic to reschedule your Cardiomems and right heart catheterization procedure.  As I mentioned, you will be given a dose of contrast during the procedure.  To minimize any risk of an allergic reaction (anaphylactic) to the contrast, we will have you take a Prednisone and Benadryl the night before and morning of your procedure.  This is the  "safest way to proceed with the procedure.     Additional Information About Your Visit       MyChart Information    [facility] gives you secure access to your electronic health record. If you see a primary care provider, you can also send messages to your care team and make appointments. If you have questions, please call your primary care clinic.  If you do not have a primary care provider, please call [phone] and they will assist you.       Care EveryWhere ID    This is your Care EveryWhere ID. This could be used by other organizations to access your [facility] medical records  [id number]       Your Vitals Were     Blood Pressure   126/45 (BP Location: Left arm, Cuff Size: Adult Regular)          Pulse   57          Temperature   97.4  F (36.3  C) (Oral)          Respirations   18          Height   1.676 m (5' 6\")             Weight   145 kg (319 lb 10.7 oz)    Pulse Oximetry   98%    BMI (Body Mass Index)   51.60 kg/m           Primary Care Provider Office Phone # Fax #    Amee Keila Connell [phone]      Equal Access to Services    [facility]: Hadii aad ku hadasho Soomaali, waaxda luqadaha, qaybta kaalmada adeegyada, waxay yoni pena . So [facility] [phone].    ATENCIÓN: Si habla español, tiene a gillespie disposición servicios gratuitos de asistencia lingüística. Llame al [phone].    We comply with applicable federal civil rights laws and [place] laws. We do not discriminate on the basis of race, color, national origin, age, disability, sex, sexual orientation, or gender identity.           Thank you!    Thank you for choosing [facility] for your care. Our goal is always to provide you with excellent care. Hearing back from our patients is one way we can continue to improve our services. Please take a few minutes to complete the written survey that you may receive in the mail after you visit with us. Thank you!            Medication List    "   Medications          Morning Afternoon Evening Bedtime As Needed    blood glucose lancets  INSTRUCTIONS:  Use to test blood sugars 2 times daily or as directed.                     blood glucose test strip  Also known as:  NO BRAND SPECIFIED  INSTRUCTIONS:  1 strip by In Vitro route 4 times daily Strips per patient's preference                     diphenhydrAMINE 50 MG capsule  Also known as:  BENADRYL  INSTRUCTIONS:  Take 1 capsule (50 mg) by mouth See Admin Instructions for 2 doses  Doctor's comments:  Take night before and 1 hour prior to Cardiomems procedure                     * ONE TOUCH ULTRA (DEVICES) Misc  INSTRUCTIONS:  test blood sugar BID                     * blood glucose monitoring meter device kit  Also known as:  NO BRAND SPECIFIED  INSTRUCTIONS:  Use to test blood sugar four times daily or as directed.  Doctor's comments:  One touch mini                     predniSONE 50 MG tablet  Also known as:  DELTASONE  INSTRUCTIONS:  Take 1 tablet (50 mg) by mouth daily for 2 doses  Doctor's comments:  Take night before and morning of CardioMems Procedure                        * This list has 2 medication(s) that are the same as other medications prescribed for you. Read the directions carefully, and ask your doctor or other care provider to review them with you.            ASK your doctor about these medications          Morning Afternoon Evening Bedtime As Needed    acetaminophen 325 MG tablet  Also known as:  TYLENOL  INSTRUCTIONS:  Take 650 mg by mouth every 4 hours as needed for mild pain Take 2 tablets by mouth every 4 hours as needed for mild pain                     aspirin 81 MG EC tablet  Also known as:  ASA  INSTRUCTIONS:  Take 1 tablet (81 mg) by mouth daily                     atorvastatin 40 MG tablet  Also known as:  LIPITOR  INSTRUCTIONS:  Take 1 tablet (40 mg) by mouth daily                     bumetanide 1 MG tablet  Also known as:  BUMEX  INSTRUCTIONS:  Take 2 tablets (2 mg) by mouth 2  times daily Or as directed by CORE  Doctor's comments:  Does not need refills, Updating Rx                     EPINEPHrine 0.3 MG/0.3ML injection 2-pack  Also known as:  EPIPEN/ADRENACLICK/or ANY BX GENERIC EQUIV  INSTRUCTIONS:  Inject 0.3 mLs (0.3 mg) into the muscle once as needed for anaphylaxis                     escitalopram 20 MG tablet  Also known as:  LEXAPRO  INSTRUCTIONS:  Take 1 tablet (20 mg) by mouth every morning                     ferrous gluconate 324 (38 Fe) MG tablet  Also known as:  FERGON  INSTRUCTIONS:  Take 1 tablet (324 mg) by mouth Every Mon, Wed, Fri Morning                     folic acid 1 MG tablet  Also known as:  FOLVITE  INSTRUCTIONS:  Take 2 tablets (2 mg) by mouth daily                     guaiFENesin 600 MG 12 hr tablet  Also known as:  MUCINEX  INSTRUCTIONS:  Take 1 tablet (600 mg) by mouth 2 times daily                     insulin glargine 100 UNIT/ML pen  Also known as:  LANTUS SOLOSTAR  INSTRUCTIONS:  Inject 20 Units Subcutaneous daily Call if blood sugar <70, often >200.                     levothyroxine 150 MCG tablet  Also known as:  SYNTHROID/LEVOTHROID  INSTRUCTIONS:  Take 1 tablet (150 mcg) by mouth daily                     losartan 50 MG tablet  Also known as:  COZAAR  INSTRUCTIONS:  Take 1 tablet (50 mg) by mouth every morning AND 0.5 tablets (25 mg) every evening.                     LYRICA 100 MG capsule  INSTRUCTIONS:  Take 1 capsule (100 mg) by mouth 2 times daily  Generic drug:  pregabalin                     metoprolol tartrate 50 MG tablet  Also known as:  LOPRESSOR  INSTRUCTIONS:  Take 1 tablet (50 mg) by mouth 2 times daily                     niacin  MG CR tablet  Also known as:  NIASPAN  INSTRUCTIONS:  Take 1 tablet (500 mg) by mouth daily                     NITROSTAT 0.4 MG sublingual tablet  INSTRUCTIONS:  For chest pain place 1 tablet under the tongue every 5 minutes for 3 doses. If symptoms persist 5 minutes after 1st dose call 911.  Generic drug:   nitroGLYcerin                     potassium chloride ER 10 MEQ CR tablet  Also known as:  K-DUR/KLOR-CON M  INSTRUCTIONS:  Take 2 tablets (20 mEq) by mouth 3 times daily                     PROAIR  (90 Base) MCG/ACT inhaler  INSTRUCTIONS:  Inhale 1-2 puffs into the lungs every 4 hours as needed for wheezing  Generic drug:  albuterol                     repaglinide 0.5 MG tablet  Also known as:  PRANDIN  INSTRUCTIONS:  Take 1 tablet (0.5 mg) by mouth 3 times daily (before meals)                     senna-docusate 8.6-50 MG tablet  Also known as:  SENOKOT-S/PERICOLACE  INSTRUCTIONS:  Take 1-2 tablets by mouth 2 times daily as needed for constipation                     traZODone 50 MG tablet  Also known as:  DESYREL  INSTRUCTIONS:  TAKE 3 TABLETS(150 MG) BY MOUTH AT BEDTIME                     vitamin D3 21119 units (250 mcg) capsule  Also known as:  CHOLECALCIFEROL  INSTRUCTIONS:  Take 1 capsule (10,000 Units) by mouth daily

## 2019-06-13 NOTE — PROGRESS NOTES
Prep was completed for R heart cath and MEMS placement.Pt has a contrast allergy which is anaphylactic reaction.Notified Nata LIYAH and it was determined that procedure is cancelled.Nata here discussing meds for future procedure.Pt received discharge sheet that the  will call them to remind her to take premeds for procedure and prescription was sent with pt for benadryl and prednisone.

## 2019-06-13 NOTE — IP AVS SNAPSHOT
[address] [address]                                    After Visit Summary   6/13/2019    Mariel Ribeiro    MRN: [id number]           After Visit Summary Signature Page    I have received my discharge instructions, and my questions have been answered. I have discussed any challenges I see with this plan with the nurse or doctor.    ..........................................................................................................................................  Patient/Patient Representative Signature      ..........................................................................................................................................  Patient Representative Print Name and Relationship to Patient    ..................................................               ................................................  Date                                   Time    ..........................................................................................................................................  Reviewed by Signature/Title    ...................................................              ..............................................  Date                                               Time          22EPIC Rev 08/18

## 2019-06-13 NOTE — DISCHARGE INSTRUCTIONS
Mariel,     I will forward a message on to the heart failure clinic to reschedule your Cardiomems and right heart catheterization procedure.  As I mentioned, you will be given a dose of contrast during the procedure.  To minimize any risk of an allergic reaction (anaphylactic) to the contrast, we will have you take a Prednisone and Benadryl the night before and morning of your procedure.  This is the safest way to proceed with the procedure.

## 2019-06-13 NOTE — PROGRESS NOTES
Brief Cardiology Update    Mariel Ribeiro is a 74 y.o.F with a PMHx of CAD s/p PCI, CABG (2018), T2DM, HLD, CKDIII, COPD, HFrEF, BELEN, and former smoker who was seen today for planned RHC/Cardiomems procedure.     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  Per patient, this was discovered many years ago (10+), that caused her to have lip, mouth, throat swelling, and difficulty breathing.  She states she was not intubated, but remembers needs high levels of O2 after contrast dye given for a surgery.  Patient is seen with friend who also confirms this story.  They both report, since this reaction she has always been given pre-treatment prior to procedures/images requiring contrast.      Patient did not get pre-treatment for contrast today.  For RHC/Cardiomems a contrast load is given at the end of the case during pulmonary angiogram to confirm correct placement of CardioMems.  Decision was made to cancel patient's case today, reschedule it at a later date, with plans for patient to be given Prednisone and Benadryl the night before and morning of procedure. Patient and family friend very understanding of the need to reschedule.     Case and planning was discussed with Dr. Alexis, who is in a agreement with the above plan.    CHANTAL Chapman, CNP  [facility]  [phone]

## 2019-06-17 DIAGNOSIS — I50.32 CHRONIC DIASTOLIC HEART FAILURE (H): Primary | Chronic | ICD-10-CM

## 2019-06-17 DIAGNOSIS — E03.8 OTHER SPECIFIED HYPOTHYROIDISM: ICD-10-CM

## 2019-06-17 RX ORDER — LIDOCAINE 40 MG/G
CREAM TOPICAL
Status: CANCELLED | OUTPATIENT
Start: 2019-06-17

## 2019-06-18 ENCOUNTER — PATIENT OUTREACH (OUTPATIENT)
Dept: CARE COORDINATION | Facility: CLINIC | Age: 74
End: 2019-06-18

## 2019-06-18 DIAGNOSIS — T78.40XA ALLERGIC REACTION: Primary | ICD-10-CM

## 2019-06-18 ASSESSMENT — ACTIVITIES OF DAILY LIVING (ADL): DEPENDENT_IADLS:: INDEPENDENT

## 2019-06-18 NOTE — PROGRESS NOTES
Clinic Care Coordination Contact    Clinic Care Coordination Contact  OUTREACH    Referral Information:  Referral Source: ED Follow-Up    Primary Diagnosis: Neurological Disorders(weakness low blood sugar )    Chief Complaint   Patient presents with     Clinic Care Coordination - Post Hospital     Clinic Care Coordination RN         Universal Utilization:   ED Follow-Up  [facility]- 4/25-5/3/2019-  Discharge Diagnoses       Acute on Chronic diastolic heart failure (H)    Coronary atherosclerosis    Hypothyroidism    Migraine    Chronic airway obstruction/ COPD    Obstructive sleep apnea    Hypertension goal BP (blood pressure) < 130/80    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)    Hyperlipidemia with target LDL less than 70    Transient cerebral ischemia    S/P CABG x 3    Type 2 diabetes mellitus with stage 3 chronic kidney disease, with long-term current use of insulin (H)    Lymphedema        Clinic Utilization  Difficulty keeping appointments:: No  Compliance Concerns: No  No PCP office visit in Past Year: No  Utilization    Last refreshed: 6/17/2019  3:18 PM:  Hospital Admissions 5           Last refreshed: 6/17/2019  3:18 PM:  ED Visits 4           Last refreshed: 6/17/2019  3:18 PM:  No Show Count (past year) 8              Current as of: 6/17/2019  3:18 PM            Clinical Concerns:  Current Medical Concerns:  Patient reports she had to reschedule her heart cath due to she is is allergic to the contrast dye  Patient will have a back injection Thursday   Patient continues home care services  Pain  Pain (GOAL):: Yes  Location of chronic pain:: Back Fibermyalgia  Progression: Unchanged  Health Maintenance Reviewed:    Clinical Pathway: None    Medication Management:  Not discussed      Functional Status:  Dependent IADLs:: Independent  Bed or wheelchair confined:: No    Diet/Exercise/Sleep:  Food Insecurity: No  Tube Feeding: No  Exercise:: Unable to exercise  Inadequate activity/exercise  (GOAL):: No  Significant changes in sleep pattern (GOAL): No    Transportation:  Transportation concerns (GOAL):: No  Transportation means:: Family       Financial/Insurance:   Financial/Insurance concerns (GOAL):: No       Resources and Interventions:  Current Resources:   List of home care services:: Skilled Nursing, Home Health Aid, Physicial Therapy;      Supplies used at home:: Nebulizer tubing       Advance Care Plan/Directive  Type Advanced Care Plans/Directives: Advanced Directive - On File    Referrals Placed: Home Care     Goals:   Goals        General    # 3 Medical (pt-stated)     Notes - Note edited  6/5/2019  1:27 PM by Senia Durán RN    Goal Statement:I will prevent a future fall   Measure of Success: No falls  Supportive Steps to Achieve:  I will use cane or walker at all times   I will use lifeline when I go to the bathroom because walker doesn't fit   Barriers:Lightheaded /Dizzy leg weakness   Strengths: outpatient PT/OT 3/6  Date to Achieve By: 7/4/2019  Patient expressed understanding of goal: Yes        #1 Medical (pt-stated)     Notes - Note edited  6/5/2019  1:27 PM by Senia Durán RN    Goal Statement: I will monitor shortness of breath episodes   Measure of Success: More energy for daily activity   Supportive Steps to Achieve:   I will seek medical help if experiencing increased shortness of breath episodes   I will follow  COPD Action Plan   Barriers: None identified   Strengths: supportive room mate   Date to Achieve By 7/4/2019  Patient expressed understanding of goal: Yes           Outreach Frequency: weekly  Future Appointments              In 1 week UC LAB  Health Lab, [facility]    In 1 week Stephanie Wolf MD [facility], [facility]    In 1 week UU LAB GOLD WAITING [facility] O    In 1 week U2A ROOM 5 [address] [facility] O    In 1 month Odalis Medley PA-C  Health Endocrinology, [facility]    In 2 months Bharat Fitzgerald,  [facility], [facility]          Plan:   Patient continues home care services   CC will put the patient on the new care coordinators calendar to follow up after her back injection     Senia Durán RN, Care Coordinator   [facility] -Care Coordination  Doug Christine  [phone]

## 2019-06-18 NOTE — TELEPHONE ENCOUNTER
"Requested Prescriptions   Pending Prescriptions Disp Refills     levothyroxine (SYNTHROID/LEVOTHROID) 150 MCG tablet 30 tablet 0     Sig: Take 1 tablet (150 mcg) by mouth daily  Last Written Prescription Date:  5/3/2019  Last Fill Quantity: 30tablet,  # refills: 0   Last Office Visit: 5/29/2019 Becki  Future Office Visit:            Thyroid Protocol Passed - 6/17/2019  2:03 PM        Passed - Patient is 12 years or older        Passed - Recent (12 mo) or future (30 days) visit within the authorizing provider's specialty     Patient had office visit in the last 12 months or has a visit in the next 30 days with authorizing provider or within the authorizing provider's specialty.  See \"Patient Info\" tab in inbasket, or \"Choose Columns\" in Meds & Orders section of the refill encounter.              Passed - Medication is active on med list        Passed - Normal TSH on file in past 12 months     Recent Labs   Lab Test 08/28/18  1427   TSH 2.97              Passed - No active pregnancy on record     If patient is pregnant or has had a positive pregnancy test, please check TSH.          Passed - No positive pregnancy test in past 12 months     If patient is pregnant or has had a positive pregnancy test, please check TSH.            "

## 2019-06-20 ENCOUNTER — TRANSFERRED RECORDS (OUTPATIENT)
Dept: HEALTH INFORMATION MANAGEMENT | Facility: CLINIC | Age: 74
End: 2019-06-20

## 2019-06-21 DIAGNOSIS — E03.8 OTHER SPECIFIED HYPOTHYROIDISM: ICD-10-CM

## 2019-06-21 RX ORDER — LEVOTHYROXINE SODIUM 150 UG/1
150 TABLET ORAL DAILY
Qty: 30 TABLET | Refills: 0 | Status: SHIPPED | OUTPATIENT
Start: 2019-06-21 | End: 2019-06-21

## 2019-06-21 NOTE — TELEPHONE ENCOUNTER
"Requested Prescriptions   Pending Prescriptions Disp Refills     levothyroxine (SYNTHROID/LEVOTHROID) 150 MCG tablet [Pharmacy Med Name: LEVOTHYROXINE 0.150MG (150MCG) TAB] 90 tablet 0     Sig: TAKE 1 TABLET(150 MCG) BY MOUTH DAILY      Last Written Prescription Date:  6/21/2019     Last Fill Quantity: 30 tablet    ,  # refills: 0   Last Office Visit: 5/29/2019   Future Office Visit:            Thyroid Protocol Passed - 6/21/2019  9:41 AM        Passed - Patient is 12 years or older        Passed - Recent (12 mo) or future (30 days) visit within the authorizing provider's specialty     Patient had office visit in the last 12 months or has a visit in the next 30 days with authorizing provider or within the authorizing provider's specialty.  See \"Patient Info\" tab in inbasket, or \"Choose Columns\" in Meds & Orders section of the refill encounter.          Passed - Medication is active on med list        Passed - Normal TSH on file in past 12 months     Recent Labs   Lab Test 08/28/18  1427   TSH 2.97           Passed - No active pregnancy on record     If patient is pregnant or has had a positive pregnancy test, please check TSH.        Passed - No positive pregnancy test in past 12 months     If patient is pregnant or has had a positive pregnancy test, please check TSH.          "

## 2019-06-22 NOTE — TELEPHONE ENCOUNTER
Routing refill request to provider for review/approval because:  Push back for 90 day supply

## 2019-06-23 RX ORDER — LEVOTHYROXINE SODIUM 150 UG/1
TABLET ORAL
Qty: 90 TABLET | Refills: 0 | Status: SHIPPED | OUTPATIENT
Start: 2019-06-23 | End: 2019-09-23

## 2019-06-24 ENCOUNTER — DOCUMENTATION ONLY (OUTPATIENT)
Dept: CARE COORDINATION | Facility: CLINIC | Age: 74
End: 2019-06-24

## 2019-06-24 DIAGNOSIS — E11.22 TYPE 2 DIABETES MELLITUS WITH CHRONIC KIDNEY DISEASE, WITHOUT LONG-TERM CURRENT USE OF INSULIN, UNSPECIFIED CKD STAGE (H): ICD-10-CM

## 2019-06-24 NOTE — PROGRESS NOTES
[facility] now requests orders and shares plan of care/discharge summaries for some patients through [employer].  Please REPLY TO THIS MESSAGE OR ROUTE BACK TO THE AUTHOR in order to give authorization for orders when needed.  This is considered a verbal order, you will still receive a faxed copy of orders for signature.  Thank you for your assistance in improving collaboration for our patients.    ORDER:  Request 1 additional PT session to finalize home exercise program, balance training and follow up s/p spinal injection.    Thank you,   Radha Roa PT  [facility]  [email or web address]  [phone]

## 2019-06-24 NOTE — TELEPHONE ENCOUNTER
"Requested Prescriptions   Pending Prescriptions Disp Refills     LANTUS SOLOSTAR 100 UNIT/ML soln [Pharmacy Med Name: LANTUS SOLOSTAR PEN INJ 3ML]  Last Written Prescription Date:  5-7-19  Last Fill Quantity: 15 ml,  # refills: 0   Last office visit: 5/29/2019 with prescribing provider:  BETTY ALVARADO    Future Office Visit:    15 mL 0     Sig: INJECT 5 UNITS UNDER THE SKIN EVERY NIGHT AT BEDTIME       Long Acting Insulin Protocol Passed - 6/24/2019  9:59 AM        Passed - Blood pressure less than 140/90 in past 6 months     BP Readings from Last 3 Encounters:   06/13/19 126/45   05/29/19 140/57   05/22/19 120/54           Passed - LDL on file in past 12 months     Recent Labs   Lab Test 03/13/19  1508   LDL 76           Passed - Microalbumin on file in past 12 months     Recent Labs   Lab Test 05/22/19  1530   MICROL 31   UMALCR 31.53*           Passed - Serum creatinine on file in past 12 months     Recent Labs   Lab Test 06/13/19  0949   CR 1.73*           Passed - HgbA1C in past 3 or 6 months     If HgbA1C is 8 or greater, it needs to be on file within the past 3 months.  If less than 8, must be on file within the past 6 months.     Recent Labs   Lab Test 04/30/19  0658 04/03/19   A1C 6.5*  --    HEMOGLOBINA1  --  6.5*           Passed - Medication is active on med list        Passed - Patient is age 18 or older        Passed - Recent (6 mo) or future (30 days) visit within the authorizing provider's specialty     Patient had office visit in the last 6 months or has a visit in the next 30 days with authorizing provider or within the authorizing provider's specialty.  See \"Patient Info\" tab in inbasket, or \"Choose Columns\" in Meds & Orders section of the refill encounter.             "

## 2019-06-25 DIAGNOSIS — I25.10 ATHEROSCLEROSIS OF NATIVE CORONARY ARTERY WITHOUT ANGINA PECTORIS, UNSPECIFIED WHETHER NATIVE OR TRANSPLANTED HEART: ICD-10-CM

## 2019-06-25 NOTE — TELEPHONE ENCOUNTER
"Requested Prescriptions   Pending Prescriptions Disp Refills     atorvastatin (LIPITOR) 40 MG tablet  Last Written Prescription Date:  5-3-19  Last Fill Quantity: 30 tab,  # refills: 0   Last office visit: 5/29/2019 with prescribing provider:  BETTY ALVARADO    Future Office Visit:    30 tablet 0     Sig: Take 1 tablet (40 mg) by mouth daily       Statins Protocol Passed - 6/25/2019  9:39 AM        Passed - LDL on file in past 12 months     Recent Labs   Lab Test 03/13/19  1508   LDL 76           Passed - No abnormal creatine kinase in past 12 months     Recent Labs   Lab Test 09/10/18  0614   CKT 32           Passed - Recent (12 mo) or future (30 days) visit within the authorizing provider's specialty     Patient had office visit in the last 12 months or has a visit in the next 30 days with authorizing provider or within the authorizing provider's specialty.  See \"Patient Info\" tab in inbasket, or \"Choose Columns\" in Meds & Orders section of the refill encounter.          Passed - Medication is active on med list        Passed - Patient is age 18 or older        Passed - No active pregnancy on record        Passed - No positive pregnancy test in past 12 months         "

## 2019-06-26 ENCOUNTER — OFFICE VISIT (OUTPATIENT)
Dept: CARDIOLOGY | Facility: CLINIC | Age: 74
End: 2019-06-26
Attending: INTERNAL MEDICINE
Payer: MEDICARE

## 2019-06-26 VITALS
HEART RATE: 61 BPM | SYSTOLIC BLOOD PRESSURE: 116 MMHG | OXYGEN SATURATION: 97 % | HEIGHT: 66 IN | BODY MASS INDEX: 47.09 KG/M2 | DIASTOLIC BLOOD PRESSURE: 68 MMHG | WEIGHT: 293 LBS

## 2019-06-26 DIAGNOSIS — I25.10 ATHEROSCLEROSIS OF NATIVE CORONARY ARTERY WITHOUT ANGINA PECTORIS, UNSPECIFIED WHETHER NATIVE OR TRANSPLANTED HEART: ICD-10-CM

## 2019-06-26 DIAGNOSIS — I50.32 CHRONIC DIASTOLIC HEART FAILURE (H): Chronic | ICD-10-CM

## 2019-06-26 LAB
ANION GAP SERPL CALCULATED.3IONS-SCNC: 4 MMOL/L (ref 3–14)
BUN SERPL-MCNC: 32 MG/DL (ref 7–30)
CALCIUM SERPL-MCNC: 9.5 MG/DL (ref 8.5–10.1)
CHLORIDE SERPL-SCNC: 104 MMOL/L (ref 94–109)
CO2 SERPL-SCNC: 30 MMOL/L (ref 20–32)
CREAT SERPL-MCNC: 1.54 MG/DL (ref 0.52–1.04)
GFR SERPL CREATININE-BSD FRML MDRD: 33 ML/MIN/{1.73_M2}
GLUCOSE SERPL-MCNC: 117 MG/DL (ref 70–99)
POTASSIUM SERPL-SCNC: 4.4 MMOL/L (ref 3.4–5.3)
SODIUM SERPL-SCNC: 138 MMOL/L (ref 133–144)

## 2019-06-26 PROCEDURE — 80048 BASIC METABOLIC PNL TOTAL CA: CPT | Performed by: INTERNAL MEDICINE

## 2019-06-26 PROCEDURE — G0463 HOSPITAL OUTPT CLINIC VISIT: HCPCS | Mod: ZF

## 2019-06-26 PROCEDURE — 99214 OFFICE O/P EST MOD 30 MIN: CPT | Mod: ZP | Performed by: INTERNAL MEDICINE

## 2019-06-26 PROCEDURE — 36415 COLL VENOUS BLD VENIPUNCTURE: CPT | Performed by: INTERNAL MEDICINE

## 2019-06-26 RX ORDER — ATORVASTATIN CALCIUM 40 MG/1
40 TABLET, FILM COATED ORAL DAILY
Qty: 30 TABLET | Refills: 0 | Status: SHIPPED | OUTPATIENT
Start: 2019-06-26 | End: 2019-06-26

## 2019-06-26 RX ORDER — ATORVASTATIN CALCIUM 40 MG/1
TABLET, FILM COATED ORAL
Qty: 90 TABLET | Refills: 0 | Status: SHIPPED | OUTPATIENT
Start: 2019-06-26 | End: 2019-09-23

## 2019-06-26 RX ORDER — BUMETANIDE 2 MG/1
2 TABLET ORAL 2 TIMES DAILY
COMMUNITY
End: 2019-06-26

## 2019-06-26 RX ORDER — BUMETANIDE 1 MG/1
TABLET ORAL
Qty: 300 TABLET | Refills: 3 | Status: SHIPPED | OUTPATIENT
Start: 2019-06-26 | End: 2019-07-31

## 2019-06-26 ASSESSMENT — MIFFLIN-ST. JEOR: SCORE: 1980.06

## 2019-06-26 ASSESSMENT — PAIN SCALES - GENERAL: PAINLEVEL: NO PAIN (0)

## 2019-06-26 NOTE — PATIENT INSTRUCTIONS
Cardiology Providers you saw during your visit:  Dr. Wolf    Medication changes:  1- Increase your bumex to 3mg in the morning and 2mg in the afternoons.    Follow up:  1- Get cardioMEMS on Monday.   2 - Return for CORE appointment in 1 month.  3 - Return to see Dr. Wolf in 4 months.     Right Heart Cath and CardioMEMS Implant Instructions     A Right Heart Cath is a test that measures how well your heart is pumping blood to your body and will assess the volume and pressures in your heart. A CardioMEMS device is implanted in the Pulmonary artery during a Right Heart Cath.   Once your CardioMEMS device is implanted, you will do daily readings at home that are electronically sent to your Cardiology Team and we will adjust your medications off of these internal numbers.     You are scheduled for a Right Heart Catheterization/CardioMEMS implant at the [facility], [address] [place] [address] on Thursday 6/13/19. You are to check in at the [facility] Waiting Area at 9:30am.     When you arrive you will be escorted back to the pre procedure area called 2A. Here they will insert an IV, draw labs, and obtain a short medical history. Please bring an updated list of your current medications.     A physician will come and talk with you about the procedure and obtain consent.     A nurse from the Cardiac Catheterization Lab will then escort you to the procedure area. You will receive a shot to numb the site where the catheter (tube) will enter your body in your groin (femoral vein).  You may receive sedation or anesthesia.  You will need a  to bring you home. You cannot drive yourself home if you received sedation.     After the procedure you will recover for a short period on 6C in the hospital.  You will receive CardioMEMS education on how to do your daily home readings before you go home. You will be discharged home that day if no complications arise.       Pre procedure  instructions:     1.  Do not eat any solid food or milk products for 6 hours prior to the exam. You may drink clear liquids until 2 hours prior to the exam. Clear liquids include the following: water, Jell-O, clear broth, apple juice or any non-carbonated drink that you can see through (no pop!).   2. Do not drink alcohol or smoke 24 hours prior to test.   3. Hold these meds:   Do not take your oral diabetic medication or short acting insulin the day of the procedure.       Hold your anticoagulation medication:   Aspirin: Hold for 1 WEEK (7 Days) prior.     After the CardioMEMS is implanted, you may restart your aspirin that day if no complications arise.       4. You may take your other morning medications as prescribed with a sip of water. You may hold your diuretic that morning.     Post procedure instructions:     1. You will be on an antiplatelet medication called Plavix/clopidigrel 75mg daily for 1 month after your procedure.   2. You will be on a baby aspirin 81mg daily for life.   3. You will complete a CardioMEMS reading by laying on the monitor/pillow that you were provided daily.     If you have further questions, please utilize [employer] to contact us.     Results for ROSY PALMER (MRN [id number]) as of 6/26/2019 16:07   Ref. Range 6/26/2019 15:19   Sodium Latest Ref Range: 133 - 144 mmol/L 138   Potassium Latest Ref Range: 3.4 - 5.3 mmol/L 4.4   Chloride Latest Ref Range: 94 - 109 mmol/L 104   Carbon Dioxide Latest Ref Range: 20 - 32 mmol/L 30   Urea Nitrogen Latest Ref Range: 7 - 30 mg/dL 32 (H)   Creatinine Latest Ref Range: 0.52 - 1.04 mg/dL 1.54 (H)   GFR Estimate Latest Ref Range: >60 mL/min/1.73_m2 33 (L)   GFR Estimate If Black Latest Ref Range: >60 mL/min/1.73_m2 38 (L)   Calcium Latest Ref Range: 8.5 - 10.1 mg/dL 9.5   Anion Gap Latest Ref Range: 3 - 14 mmol/L 4   Glucose Latest Ref Range: 70 - 99 mg/dL 117 (H)        Please call if you have:  1. Weight gain of more than 2 pounds in a day or  5 pounds in a week  2. Increased shortness of breath, swelling or bloating  3. Dizziness, lightheadedness   4. Any questions or concerns.      Follow the American Heart Association Diet and Lifestyle recommendations:  Limit saturated fat, trans fat, sodium, red meat, sweets and sugar-sweetened beverages. If you choose to eat red meat, compare labels and select the leanest cuts available.  Aim for at least 150 minutes of moderate physical activity or 75 minutes of vigorous physical activity - or an equal combination of both - each week.     During business hours: [phone], press option # 1 to be routed to the [facility] then option # 4 to send a message to your care team     After hours, weekends or holidays: On Call Cardiologist- [phone]   option #4 and ask to speak to the on-call Cardiologist. Inform them you are a CORE/heart failure patient at the [facility].     Shayna Newell, RN BSN  Cardiology Nurse Care Coordinator     Keep up the good work!     Take Care!

## 2019-06-26 NOTE — NURSING NOTE
Diet: Patient instructed regarding a heart failure healthy diet, including discussion of reduced fat and 2000 mg daily sodium restriction, daily weights, medication purpose and compliance, fluid restrictions and resources for patient and family to access for assistance with heart failure management.       Labs: Patient was given results of the laboratory testing obtained today and patient was instructed about when to return for the next laboratory testing.     Med Reconcile: Reviewed and verified all current medications with the patient. The updated medication list was printed and given to the patient. Increase bumex to 3mg in AM, 2mg in PM.    Return Appointment: Patient given instructions regarding scheduling next clinic visit. RTC to see CORE in 1 month. RTC to see dr barger in 4 months. CardioMEMS on Monday.    Patient stated she understood all health information given and agreed to call with further questions or concerns.     Shayna Newell RN

## 2019-06-26 NOTE — TELEPHONE ENCOUNTER
Chantal Connell MD,  Please review/sign or advise for refill request of: atorvastatin (LIPITOR) 40 MG tablet    Routing refill request to provider for review/approval because:  Medication has not been prescribed by provider in our clinic. Last Rx sent by Brittany Fuentes PA-C  atorvastatin (LIPITOR) 40 MG tablet 30 tablet 0 5/3/2019     Thank You!  Neelima Dixon, RN  Triage Nurse

## 2019-06-26 NOTE — TELEPHONE ENCOUNTER
"Requested Prescriptions   Pending Prescriptions Disp Refills     atorvastatin (LIPITOR) 40 MG tablet [Pharmacy Med Name: ATORVASTATIN 40MG TABLETS] 90 tablet 0     Sig: TAKE 1 TABLET(40 MG) BY MOUTH DAILY      Last Written Prescription Date:  6/26/2019  Last Fill Quantity: 30 tablet    ,  # refills: 0   Last Office Visit: 5/29/2019   Future Office Visit:            Statins Protocol Passed - 6/26/2019  3:25 PM        Passed - LDL on file in past 12 months     Recent Labs   Lab Test 03/13/19  1508   LDL 76           Passed - No abnormal creatine kinase in past 12 months     Recent Labs   Lab Test 09/10/18  0614   CKT 32           Passed - Recent (12 mo) or future (30 days) visit within the authorizing provider's specialty     Patient had office visit in the last 12 months or has a visit in the next 30 days with authorizing provider or within the authorizing provider's specialty.  See \"Patient Info\" tab in inbasket, or \"Choose Columns\" in Meds & Orders section of the refill encounter.          Passed - Medication is active on med list        Passed - Patient is age 18 or older        Passed - No active pregnancy on record        Passed - No positive pregnancy test in past 12 months          "
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
## 2019-06-26 NOTE — NURSING NOTE
Chief Complaint   Patient presents with     Follow Up     reason for visit: RTN HF: 74 year old female presents with chronic diastolic heart failure for 3 month follow up with labs prior     Vitals were taken and medications were reconciled..    JOSE GUADALUPE Mcclellan  3:40 PM

## 2019-06-26 NOTE — TELEPHONE ENCOUNTER
Routine to previous prescribing provider, medication has not been prescribed by provider in clinic

## 2019-06-27 ENCOUNTER — TELEPHONE (OUTPATIENT)
Dept: ENDOCRINOLOGY | Facility: CLINIC | Age: 74
End: 2019-06-27

## 2019-06-27 ENCOUNTER — PATIENT OUTREACH (OUTPATIENT)
Dept: CARE COORDINATION | Facility: CLINIC | Age: 74
End: 2019-06-27

## 2019-06-27 DIAGNOSIS — E11.22 TYPE 2 DIABETES MELLITUS WITH STAGE 3 CHRONIC KIDNEY DISEASE, WITH LONG-TERM CURRENT USE OF INSULIN (H): ICD-10-CM

## 2019-06-27 DIAGNOSIS — I50.32 CHRONIC DIASTOLIC HEART FAILURE (H): ICD-10-CM

## 2019-06-27 DIAGNOSIS — Z79.4 TYPE 2 DIABETES MELLITUS WITH STAGE 3 CHRONIC KIDNEY DISEASE, WITH LONG-TERM CURRENT USE OF INSULIN (H): ICD-10-CM

## 2019-06-27 DIAGNOSIS — N18.30 TYPE 2 DIABETES MELLITUS WITH STAGE 3 CHRONIC KIDNEY DISEASE, WITH LONG-TERM CURRENT USE OF INSULIN (H): ICD-10-CM

## 2019-06-27 DIAGNOSIS — I25.10 CORONARY ATHEROSCLEROSIS: Primary | ICD-10-CM

## 2019-06-27 RX ORDER — INSULIN GLARGINE 100 [IU]/ML
INJECTION, SOLUTION SUBCUTANEOUS
Qty: 15 ML | Refills: 0 | OUTPATIENT
Start: 2019-06-27

## 2019-06-27 NOTE — TELEPHONE ENCOUNTER
Currently  lantus  22 units daily    BS data:  6/27/19    6/26/19 = Lunch  153= Dinner  176=   6/25/19 = dinner 196=     She is having a cardio test Monday and wonders if she  Should do anything differently  for that.   Do you want her to change her dose  ? Sonya Horne RN on 6/27/2019 at 5:59 PM

## 2019-06-27 NOTE — TELEPHONE ENCOUNTER
----- Message from Muriel Will PA-C sent at 6/26/2019  4:53 PM CDT -----  Regarding: Glargine dose  Not clear if taking 20 units or 5 daily.   I left her a VM asking her to call you with this information for refill as well as recent fasting BG.  As long as no lows <70, or many values >200 and none <100 we'll renew at same dose as is currently taken.  I don;t see her back until August.  THANK YOU!

## 2019-06-27 NOTE — PROGRESS NOTES
Clinic Care Coordination Contact    Clinic Care Coordination Contact  OUTREACH    Referral Information:     Care team       Chief Complaint   Patient presents with     Clinic Care Coordination - Follow-up     RN        Universal Utilization:      Utilization    Last refreshed: 6/27/2019  9:18 PM:  Hospital Admissions 5           Last refreshed: 6/27/2019  9:18 PM:  ED Visits 4           Last refreshed: 6/27/2019  9:18 PM:  No Show Count (past year) 8              Current as of: 6/27/2019  9:18 PM              Clinical Concerns:  Current Medical Concerns:  Congestive heart failure    Current Behavioral Concerns: none    Education Provided to patient: instructed to weigh self daily and call RN CC or triage team with weight gain greater than 2 lbs in one day or 5 lbs in one week      Health Maintenance Reviewed:    Clinical Pathway: None-see CHF assessment    Medication Management:  Saw cardiology  6-26-19. Will have cardioMEMS on Monday    Functional Status:   improving after home and outpatient physical therapy    Living Situation:       Diet/Exercise/Sleep:   frustrated with weight gain and inability to lose weight    Transportation:   not discussed     Psychosocial:      no concerns  Financial/Insurance:      No concerns     Resources and Interventions:  Current Resources:    ;                         Goals:   Goals        General    # 3 Medical (pt-stated)     Notes - Note edited  6/5/2019  1:27 PM by Senia Durán, RN    Goal Statement:I will prevent a future fall   Measure of Success: No falls  Supportive Steps to Achieve:  I will use cane or walker at all times   I will use lifeline when I go to the bathroom because walker doesn't fit   Barriers:Lightheaded /Dizzy leg weakness   Strengths: outpatient PT/OT 3/6  Date to Achieve By: 7/4/2019  Patient expressed understanding of goal: Yes        #1 Medical (pt-stated)     Notes - Note edited  6/5/2019  1:27 PM by Senia Durán, RN    Goal Statement: I will  monitor shortness of breath episodes   Measure of Success: More energy for daily activity   Supportive Steps to Achieve:   I will seek medical help if experiencing increased shortness of breath episodes   I will follow  COPD Action Plan   Barriers: None identified   Strengths: supportive room mate   Date to Achieve By 7/4/2019  Patient expressed understanding of goal: Yes               Patient/Caregiver understanding: good    Outreach Frequency: weekly  Future Appointments              In 3 days UU LAB GOLD WAITING [facility] O    In 3 days U2A ROOM 5 Unit 2A [facility] O    In 1 month [facility], [facility]    In 1 month Starla Saez NP [facility]    In 1 month Odalis Medley PA-C [facility] Endocrinology, [facility]    In 2 months [facility]harat Fitzgerald [facility]    [facility]    In 4 months Stephanie Wolf MD [facility]          Plan: keep cardioMEMS appointment on Monday. Will follow up with patient next week after procedure. She agrees with plan.     iVbha Carvajal, RN  [facility]-Care Coordination  [facility]  [phone]

## 2019-06-28 DIAGNOSIS — Z53.9 DIAGNOSIS NOT YET DEFINED: Primary | ICD-10-CM

## 2019-06-28 PROCEDURE — G0180 MD CERTIFICATION HHA PATIENT: HCPCS | Performed by: FAMILY MEDICINE

## 2019-07-01 ENCOUNTER — APPOINTMENT (OUTPATIENT)
Dept: MEDSURG UNIT | Facility: CLINIC | Age: 74
End: 2019-07-01
Attending: INTERNAL MEDICINE
Payer: MEDICARE

## 2019-07-01 ENCOUNTER — HOSPITAL ENCOUNTER (OUTPATIENT)
Facility: CLINIC | Age: 74
Discharge: HOME | End: 2019-07-01
Attending: INTERNAL MEDICINE | Admitting: INTERNAL MEDICINE
Payer: MEDICARE

## 2019-07-01 ENCOUNTER — APPOINTMENT (OUTPATIENT)
Dept: LAB | Facility: CLINIC | Age: 74
End: 2019-07-01
Attending: INTERNAL MEDICINE
Payer: MEDICARE

## 2019-07-01 VITALS
TEMPERATURE: 97.9 F | WEIGHT: 293 LBS | BODY MASS INDEX: 47.09 KG/M2 | OXYGEN SATURATION: 97 % | HEIGHT: 66 IN | SYSTOLIC BLOOD PRESSURE: 121 MMHG | HEART RATE: 76 BPM | DIASTOLIC BLOOD PRESSURE: 55 MMHG | RESPIRATION RATE: 16 BRPM

## 2019-07-01 DIAGNOSIS — I50.32 CHRONIC DIASTOLIC HEART FAILURE (H): ICD-10-CM

## 2019-07-01 PROBLEM — Z98.890 STATUS POST CORONARY ANGIOGRAM: Status: ACTIVE | Noted: 2019-07-01

## 2019-07-01 LAB
ANION GAP SERPL CALCULATED.3IONS-SCNC: 8 MMOL/L (ref 3–14)
BUN SERPL-MCNC: 37 MG/DL (ref 7–30)
CALCIUM SERPL-MCNC: 9.6 MG/DL (ref 8.5–10.1)
CHLORIDE SERPL-SCNC: 104 MMOL/L (ref 94–109)
CO2 SERPL-SCNC: 23 MMOL/L (ref 20–32)
CREAT SERPL-MCNC: 1.47 MG/DL (ref 0.52–1.04)
ERYTHROCYTE [DISTWIDTH] IN BLOOD BY AUTOMATED COUNT: 12.6 % (ref 10–15)
GFR SERPL CREATININE-BSD FRML MDRD: 35 ML/MIN/{1.73_M2}
GLUCOSE BLDC GLUCOMTR-MCNC: 244 MG/DL (ref 70–99)
GLUCOSE SERPL-MCNC: 265 MG/DL (ref 70–99)
HCT VFR BLD AUTO: 43.4 % (ref 35–47)
HGB BLD-MCNC: 13.6 G/DL (ref 11.7–15.7)
MCH RBC QN AUTO: 28.8 PG (ref 26.5–33)
MCHC RBC AUTO-ENTMCNC: 31.3 G/DL (ref 31.5–36.5)
MCV RBC AUTO: 92 FL (ref 78–100)
PLATELET # BLD AUTO: 110 10E9/L (ref 150–450)
POTASSIUM SERPL-SCNC: 4.4 MMOL/L (ref 3.4–5.3)
RBC # BLD AUTO: 4.72 10E12/L (ref 3.8–5.2)
SODIUM SERPL-SCNC: 135 MMOL/L (ref 133–144)
WBC # BLD AUTO: 5.2 10E9/L (ref 4–11)

## 2019-07-01 PROCEDURE — 25000125 ZZHC RX 250: Performed by: INTERNAL MEDICINE

## 2019-07-01 PROCEDURE — 25000132 ZZH RX MED GY IP 250 OP 250 PS 637: Mod: GY | Performed by: PHYSICIAN ASSISTANT

## 2019-07-01 PROCEDURE — 93568 NJX CAR CTH NSLC P-ART ANGRP: CPT | Performed by: INTERNAL MEDICINE

## 2019-07-01 PROCEDURE — C1769 GUIDE WIRE: HCPCS | Performed by: INTERNAL MEDICINE

## 2019-07-01 PROCEDURE — 25000128 H RX IP 250 OP 636: Performed by: INTERNAL MEDICINE

## 2019-07-01 PROCEDURE — 93451 RIGHT HEART CATH: CPT | Performed by: INTERNAL MEDICINE

## 2019-07-01 PROCEDURE — 33289 TCAT IMPL WRLS P-ART PRS SNR: CPT | Performed by: INTERNAL MEDICINE

## 2019-07-01 PROCEDURE — 25000125 ZZHC RX 250: Performed by: NURSE PRACTITIONER

## 2019-07-01 PROCEDURE — 85027 COMPLETE CBC AUTOMATED: CPT | Performed by: NURSE PRACTITIONER

## 2019-07-01 PROCEDURE — 40000556 ZZH STATISTIC PERIPHERAL IV START W US GUIDANCE

## 2019-07-01 PROCEDURE — 36415 COLL VENOUS BLD VENIPUNCTURE: CPT | Performed by: NURSE PRACTITIONER

## 2019-07-01 PROCEDURE — 40000141 ZZH STATISTIC PERIPHERAL IV START W/O US GUIDANCE

## 2019-07-01 PROCEDURE — C2624 WIRELESS PRESSURE SENSOR: HCPCS | Performed by: INTERNAL MEDICINE

## 2019-07-01 PROCEDURE — 80048 BASIC METABOLIC PNL TOTAL CA: CPT | Performed by: NURSE PRACTITIONER

## 2019-07-01 PROCEDURE — 82962 GLUCOSE BLOOD TEST: CPT

## 2019-07-01 PROCEDURE — 27210794 ZZH OR GENERAL SUPPLY STERILE: Performed by: INTERNAL MEDICINE

## 2019-07-01 PROCEDURE — 40000172 ZZH STATISTIC PROCEDURE PREP ONLY

## 2019-07-01 PROCEDURE — C1887 CATHETER, GUIDING: HCPCS | Performed by: INTERNAL MEDICINE

## 2019-07-01 DEVICE — SYSTEM CARDIOMEMS PA SENSOR AND DELIVERY: Type: IMPLANTABLE DEVICE | Status: FUNCTIONAL

## 2019-07-01 RX ORDER — DEXTROSE MONOHYDRATE 25 G/50ML
25-50 INJECTION, SOLUTION INTRAVENOUS
Status: DISCONTINUED | OUTPATIENT
Start: 2019-07-01 | End: 2019-07-01 | Stop reason: HOSPADM

## 2019-07-01 RX ORDER — CLOPIDOGREL BISULFATE 75 MG/1
75 TABLET ORAL DAILY
Qty: 30 TABLET | Refills: 0 | Status: SHIPPED | OUTPATIENT
Start: 2019-07-02 | End: 2019-09-05

## 2019-07-01 RX ORDER — FLUMAZENIL 0.1 MG/ML
0.2 INJECTION, SOLUTION INTRAVENOUS
Status: DISCONTINUED | OUTPATIENT
Start: 2019-07-01 | End: 2019-07-01 | Stop reason: HOSPADM

## 2019-07-01 RX ORDER — ATROPINE SULFATE 0.1 MG/ML
0.5 INJECTION INTRAVENOUS EVERY 5 MIN PRN
Status: DISCONTINUED | OUTPATIENT
Start: 2019-07-01 | End: 2019-07-01 | Stop reason: HOSPADM

## 2019-07-01 RX ORDER — IOPAMIDOL 755 MG/ML
INJECTION, SOLUTION INTRAVASCULAR
Status: DISCONTINUED | OUTPATIENT
Start: 2019-07-01 | End: 2019-07-01 | Stop reason: HOSPADM

## 2019-07-01 RX ORDER — CLOPIDOGREL BISULFATE 75 MG/1
75 TABLET ORAL ONCE
Status: COMPLETED | OUTPATIENT
Start: 2019-07-01 | End: 2019-07-01

## 2019-07-01 RX ORDER — METHYLPREDNISOLONE SOD SUCC 125 MG
125 VIAL (EA) INJECTION ONCE
Status: COMPLETED | OUTPATIENT
Start: 2019-07-01 | End: 2019-07-01

## 2019-07-01 RX ORDER — ACETAMINOPHEN 325 MG/1
650 TABLET ORAL EVERY 4 HOURS PRN
Status: DISCONTINUED | OUTPATIENT
Start: 2019-07-01 | End: 2019-07-01 | Stop reason: HOSPADM

## 2019-07-01 RX ORDER — HYDRALAZINE HYDROCHLORIDE 20 MG/ML
10 INJECTION INTRAMUSCULAR; INTRAVENOUS ONCE
Status: DISCONTINUED | OUTPATIENT
Start: 2019-07-01 | End: 2019-07-01 | Stop reason: HOSPADM

## 2019-07-01 RX ORDER — LIDOCAINE 40 MG/G
CREAM TOPICAL
Status: DISCONTINUED | OUTPATIENT
Start: 2019-07-01 | End: 2019-07-01 | Stop reason: HOSPADM

## 2019-07-01 RX ORDER — NALOXONE HYDROCHLORIDE 0.4 MG/ML
.2-.4 INJECTION, SOLUTION INTRAMUSCULAR; INTRAVENOUS; SUBCUTANEOUS
Status: DISCONTINUED | OUTPATIENT
Start: 2019-07-01 | End: 2019-07-01

## 2019-07-01 RX ORDER — LIDOCAINE 40 MG/G
CREAM TOPICAL
Status: COMPLETED | OUTPATIENT
Start: 2019-07-01 | End: 2019-07-01

## 2019-07-01 RX ORDER — NICOTINE POLACRILEX 4 MG
15-30 LOZENGE BUCCAL
Status: DISCONTINUED | OUTPATIENT
Start: 2019-07-01 | End: 2019-07-01 | Stop reason: HOSPADM

## 2019-07-01 RX ORDER — NALOXONE HYDROCHLORIDE 0.4 MG/ML
.1-.4 INJECTION, SOLUTION INTRAMUSCULAR; INTRAVENOUS; SUBCUTANEOUS
Status: DISCONTINUED | OUTPATIENT
Start: 2019-07-01 | End: 2019-07-01 | Stop reason: HOSPADM

## 2019-07-01 RX ORDER — HYDRALAZINE HYDROCHLORIDE 20 MG/ML
INJECTION INTRAMUSCULAR; INTRAVENOUS
Status: DISCONTINUED | OUTPATIENT
Start: 2019-07-01 | End: 2019-07-01 | Stop reason: HOSPADM

## 2019-07-01 RX ORDER — FENTANYL CITRATE 50 UG/ML
25-50 INJECTION, SOLUTION INTRAMUSCULAR; INTRAVENOUS
Status: DISCONTINUED | OUTPATIENT
Start: 2019-07-01 | End: 2019-07-01 | Stop reason: HOSPADM

## 2019-07-01 RX ADMIN — LIDOCAINE: 40 CREAM TOPICAL at 08:43

## 2019-07-01 RX ADMIN — CLOPIDOGREL BISULFATE 75 MG: 75 TABLET ORAL at 14:18

## 2019-07-01 RX ADMIN — METHYLPREDNISOLONE 125 MG: 125 INJECTION, POWDER, LYOPHILIZED, FOR SOLUTION INTRAMUSCULAR; INTRAVENOUS at 10:01

## 2019-07-01 RX ADMIN — ACETAMINOPHEN 650 MG: 325 TABLET, FILM COATED ORAL at 15:17

## 2019-07-01 ASSESSMENT — MIFFLIN-ST. JEOR: SCORE: 1995.94

## 2019-07-01 NOTE — DISCHARGE INSTRUCTIONS
Going Home after Coronary Angiogram    - We placed your Cardiomems today  - Please continue taking Aspirin 81 mg everyday. You will need to take this for the rest of your life.  - Please start taking Plavix 75 mg once a day. You need to take this for one month.     FOR 24 HOURS:         Have an adult stay with you for 24 hours.         Relax and take it easy.         Do NOT make any important or legal decisions.         Do NOT drive or operate machines at home or at work.         Do NOT drink alcohol.     PROCEDURE SITE:  Care of groin site:         Remove the Band-Aid after 24 hours. If there is minor oozing, apply another Band-aid and remove it after 12 hours.          Do NOT take a bath, or use a hot tub or pool for at least 3 days. You may shower.          It is normal to have a small bruise or lump at the site.         Do not scrub the site.         Do not use lotion or powder near the puncture site for 3 days.         For the first 2 days: Do not stoop or squat. When you cough, sneeze or move your bowels, hold your hand over the puncture site and press gently.         Do not lift more than 10 pounds for at least 3 to 5 days.         For 2 days, do NOT have sex or do any heavy exercise.     If you start bleeding from the site in your groin:  Lie down flat and press firmly on the site.  Call your physician immediately, or, come to the emergency room.    Call 911 right away if you have bleeding that is heavy or does not stop.     CALL YOUR DOCTOR IF:  -You have a large or growing lump/bump around the procedure site  -The site is red, swollen, hot, tender or has drainage  -You have hives, a rash or unusual itching  -You have increasing or worsening shortness of breath or chest pain    FOLLOW UP:  We prefer you to follow up with your primary care provider within one week. You will be arranged to see the cardiologist (heart doctor) in clinic in about one month.     Should you need to contact us:  Cardiology clinic  for scheduling or triage nurse questions/concerns:  [phone]

## 2019-07-01 NOTE — PROGRESS NOTES
Prepped for RH with cardiomems.  Has chronic low back pain, no chest pain.  Friend with her.  H & P current.  Consent being discussed.  Labs pending.

## 2019-07-01 NOTE — PROGRESS NOTES
"Mariel underwent a Right Heart Catheterization with CardioMEMS implant today.     Hemodynamics at time of implant:     RHC MEMS   PA Systolic 41 39   PA Diastolic 23 18   PA Mean 31 27   Wedge 20    Heart Rate 56 65       Antiplatelet/anticoagulation Post Implant Plan: Plavix 75 mg daily for 1 month, Asprin 81 mg daily    Implanting Cardiologist: Dr Arce  Treating Cardiologist: Dr Wolf  Treating HF LIYAH: Starla Saez NP  Next scheduled CORE follow-up: 19 with Starla Hdz RN BSN  Cardiology Care Coordinator - Heart Failure/ [facility] Clinic  [facility]   Questions and [phone]   First press #1 for the [employer] and then press #4 for \"Send a message to your care team\"     "

## 2019-07-01 NOTE — PROGRESS NOTES
"Following bedrest, patient was able to ambulate with no pain. Patient with steady gait. Patient also able to eat with no n/v. Pt voided with no difficultly. Pt's blood pressure came down, so IV hydralazine was not given. VSS. AVS discussed with patient. All questions answered and pt verbalized understanding. PIV removed. Pt to main lobby via wheelchair with friend. Pt discharged.     /55   Pulse 76   Temp 97.9  F (36.6  C) (Oral)   Resp 16   Ht 1.676 m (5' 6\")   Wt 147.4 kg (325 lb)   SpO2 97%   BMI 52.46 kg/m      "

## 2019-07-01 NOTE — PROGRESS NOTES
[facility]   Interventional Cardiology        Consenting/Education for Cardiac Cath Lab Procedure: Right Heart Catheterization with CardioMems Placement     Patient understands we would like to perform .Right Heart Catheterization with cardiomems placement due to heart failure. This procedure will be performed by Dr. Arce.    The patient understands the following:     Right Heart Catheterization: A fine tube (catheter) is put into the vein of the groin/neck.  It is carefully passed along until it reaches the heart and then goes up into the blood vessels of the lungs. This is done to measure a variety of pressures in your heart and can tell us how well the heart is filling and emptying, as well as monitor fluid status.  We will then inject dye to measure the pulmonary artery and then deploy the Cardiomems device into its proper location.    No sedation is planned for this procedure. Patient also understands risks and complications of the procedure which include, but are not limited to bruising/swelling around the incision site, infection, bleeding, allergic reaction to local anesthetic, air embolism, arterial puncture, stroke, heart attack.       Patient verbalized understanding of risks and benefits and has elected to proceed with the procedure or procedures listed above.    Magdalena Hall PA-C  [facility] Interventional Cardiology  [phone]

## 2019-07-02 ENCOUNTER — PATIENT OUTREACH (OUTPATIENT)
Dept: CARE COORDINATION | Facility: CLINIC | Age: 74
End: 2019-07-02

## 2019-07-02 ENCOUNTER — PATIENT OUTREACH (OUTPATIENT)
Dept: CARDIOLOGY | Facility: CLINIC | Age: 74
End: 2019-07-02

## 2019-07-02 DIAGNOSIS — I25.10 CORONARY ATHEROSCLEROSIS: Primary | ICD-10-CM

## 2019-07-02 DIAGNOSIS — I50.32 CHRONIC DIASTOLIC HEART FAILURE (H): Primary | ICD-10-CM

## 2019-07-02 ASSESSMENT — ACTIVITIES OF DAILY LIVING (ADL): DEPENDENT_IADLS:: INDEPENDENT
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
## 2019-07-02 NOTE — PROGRESS NOTES
"[facility] - CardioMEMS Reading Review      CardioMEMS reviewed and routed to patient's provider, Starla Saez NP.   Current diuretic: Mariel states she's taking Bumex 2mg in the AM and 2mg in the PM  Recent plan of care changes: CardioMEMS implant yesterday.  Elevated Wedge pressure of 20.    Date: 2019  Time of Call: 5:30 PM  Diagnosis:  HF  VORB - Ordering provider: Starla Saez NP   Order: Increase Bumex to 3/2.  BMP next week.   Order received by: Serena Streeter RN   Follow-up/additional notes: Continue to monitor CardioMEMS    Readings:           Serena Streeter RN BSN CHFN  Cardiology Care Coordinator - C.O.R.E. [facility]  Questions and [phone]  First press #1 for the [employer] and then press #3 for \"Medical Advise\" to reach us Cardiology Nurses.                   "

## 2019-07-02 NOTE — TELEPHONE ENCOUNTER
"Spoke w/ Pt and read her message from Muriel Will and sent to [employer] so she has it \"in writing\".   Shira Cruz RN on 7/2/2019 at 4:00 PM     "

## 2019-07-02 NOTE — PROGRESS NOTES
Clinic Care Coordination Contact    Clinic Care Coordination Contact  OUTREACH    Referral Information:  Referral Source: Pro-Active Outreach         Chief Complaint   Patient presents with     Clinic Care Coordination - Follow-up     RN        Universal Utilization:      Utilization    Last refreshed: 7/2/2019 12:42 AM:  Hospital Admissions 5           Last refreshed: 7/2/2019 12:42 AM:  ED Visits 4           Last refreshed: 7/2/2019 12:42 AM:  No Show Count (past year) 8              Current as of: 7/2/2019 12:42 AM              Clinical Concerns:  Current Medical Concerns:  Patient had cardioMems placement yesterday. Procedure went OK but she didn't like them having to apply pressure to her groin for 30 minutes to stop bleeding    Current Behavioral Concerns: none    Education Provided to patient: reviewed how to contact RN CC with concerns or questions      Health Maintenance Reviewed:    Clinical Pathway: None-see assessment    Medication Management:  New anticoagulation regimen of plavix for one month and aspirin 81 mg daily     Functional Status:  Dependent IADLs:: Independent  Bed or wheelchair confined:: No    Living Situation:  Lives alone     Diet/Exercise/Sleep:   walks dog    Transportation:  self         Psychosocial:   no concerns     Financial/Insurance:      Not discussed     Resources and Interventions:  Current Resources:   List of home care services:: Skilled Nursing, Home Health Aid, Physicial Therapy;      Supplies used at home:: Nebulizer tubing       Advance Care Plan/Directive  Type Advanced Care Plans/Directives: Advanced Directive - On File    Referrals Placed: Home Care     Goals:   Goals        General    # 3 Medical (pt-stated)     Notes - Note edited  6/5/2019  1:27 PM by Senia Durán, RN    Goal Statement:I will prevent a future fall   Measure of Success: No falls  Supportive Steps to Achieve:  I will use cane or walker at all times   I will use lifeline when I go to the bathroom  because walker doesn't fit   Barriers:Lightheaded /Dizzy leg weakness   Strengths: outpatient PT/OT 3/6  Date to Achieve By: 7/4/2019  Patient expressed understanding of goal: Yes        #1 Medical (pt-stated)     Notes - Note edited  6/5/2019  1:27 PM by Senia Durán, JODY    Goal Statement: I will monitor shortness of breath episodes   Measure of Success: More energy for daily activity   Supportive Steps to Achieve:   I will seek medical help if experiencing increased shortness of breath episodes   I will follow  COPD Action Plan   Barriers: None identified   Strengths: supportive room mate   Date to Achieve By 7/4/2019  Patient expressed understanding of goal: Yes               Patient/Caregiver understanding: good    Outreach Frequency: monthly  Future Appointments              In 4 weeks [facility], [facility]    In 4 weeks Starla Saez NP [facility]    In 1 month Odalis Medley PA-C [facility]    In 1 month [facility]    In 4 months [facility], [facility]    In 4 months Stephanie Wolf MD [facility]          Plan: patient doing well with no concerns. Follow up one month. Patient agrees to contact me prior with questions or concerns. Complex care plan mailed to home    Vibha Carvajal RN  [facility]-Care Coordination  [facility]  [facility]  [phone]

## 2019-07-12 ENCOUNTER — TELEPHONE (OUTPATIENT)
Dept: CARDIOLOGY | Facility: CLINIC | Age: 74
End: 2019-07-12

## 2019-07-12 NOTE — TELEPHONE ENCOUNTER
[facility] Call Center    Phone Message    May a detailed message be left on voicemail: yes    Reason for Call: Other: Pt called in wanted to let Dr Saez team know that she will be out of town for the next two days so they wont be getting messages from her for her mems, Please call  Pt with any questions     Action Taken: Message routed to:  [facility] cardio

## 2019-07-22 ENCOUNTER — PATIENT OUTREACH (OUTPATIENT)
Dept: CARDIOLOGY | Facility: CLINIC | Age: 74
End: 2019-07-22

## 2019-07-22 NOTE — PATIENT INSTRUCTIONS
For your chest congestion:  Doxycycline twice daily for 10 days, and prednisone 20mg once daily in the mornings for 5 days.     I sent in Movantik, which is a medication for narcotic-associated constipation.  Not sure if insurance will cover it (probably not).     We are checking x-rays today of your chest and your thoracic spine.      We are checking lab work today for your A1c, thyroid, urine, and cholesterol and kidney function.         Patient returned from [address] scan via [address] [facility], RN  07/22/19 6841

## 2019-07-22 NOTE — PROGRESS NOTES
"[facility] - CardioMEMS Reading Review      CardioMEMS reviewed and routed to patient's provider, Starla Saez NP.   Current diuretic: Bumex 3/.  Has Return CORE appt on 19.  Has not gotten labs as advised.  Will follow-up again.  Recent plan of care changes: none    Left a voicemail for Mariel to call me back.     Current Threshold parameters:       Today's Waveform:       Readings:           Serena Streeter RN BSN CHFN  Cardiology Care Coordinator - C[facility]R.E. [facility]  Questions and [phone]  First press #1 for the [employer] and then press #4 for \"Medical Advise\" to reach us Cardiology Nurses.                   "

## 2019-07-23 ENCOUNTER — TELEPHONE (OUTPATIENT)
Dept: CARDIOLOGY | Facility: CLINIC | Age: 74
End: 2019-07-23
Payer: MEDICARE

## 2019-07-23 DIAGNOSIS — I50.32 CHRONIC DIASTOLIC HEART FAILURE (H): ICD-10-CM

## 2019-07-23 NOTE — TELEPHONE ENCOUNTER
[facility] Call Center    Phone Message    May a detailed message be left on voicemail: yes    Reason for Call: Other: per pt- is returning a call from [facility], i was advised to send an encounter, please call the pt back, thanks!      Action Taken: Message routed to:  [facility] cardiology

## 2019-07-23 NOTE — TELEPHONE ENCOUNTER
"Called Mariel back.  She reports \"I'm not doing the best. My weights have been up and down, up and down.\" She reports increased SOB, harder to breath when walking and swelling in hands and feet.  Mariel states she's been taking Bumex 3mg AM and 2mg PM for several weeks now as advised.  But has not had follow-ups labs yet as recommended.  We coordinated a lab appt for tomorrow, 7/24/19.  Will review results then discuss plan of care from there. Forwarding to Starla Young NP  "

## 2019-07-24 DIAGNOSIS — I50.32 CHRONIC DIASTOLIC HEART FAILURE (H): ICD-10-CM

## 2019-07-24 LAB
ANION GAP SERPL CALCULATED.3IONS-SCNC: 7 MMOL/L (ref 3–14)
BUN SERPL-MCNC: 41 MG/DL (ref 7–30)
CALCIUM SERPL-MCNC: 9.8 MG/DL (ref 8.5–10.1)
CHLORIDE SERPL-SCNC: 103 MMOL/L (ref 94–109)
CO2 SERPL-SCNC: 28 MMOL/L (ref 20–32)
CREAT SERPL-MCNC: 1.73 MG/DL (ref 0.52–1.04)
GFR SERPL CREATININE-BSD FRML MDRD: 29 ML/MIN/{1.73_M2}
GLUCOSE SERPL-MCNC: 146 MG/DL (ref 70–99)
POTASSIUM SERPL-SCNC: 4.1 MMOL/L (ref 3.4–5.3)
SODIUM SERPL-SCNC: 138 MMOL/L (ref 133–144)

## 2019-07-24 PROCEDURE — 36415 COLL VENOUS BLD VENIPUNCTURE: CPT | Performed by: NURSE PRACTITIONER

## 2019-07-24 PROCEDURE — 80048 BASIC METABOLIC PNL TOTAL CA: CPT | Performed by: NURSE PRACTITIONER

## 2019-07-25 ENCOUNTER — TELEPHONE (OUTPATIENT)
Dept: ENDOCRINOLOGY | Facility: CLINIC | Age: 74
End: 2019-07-25

## 2019-07-25 NOTE — TELEPHONE ENCOUNTER
[facility] Call Center    Phone Message    May a detailed message be left on voicemail: yes    Reason for Call: Other: per sb from [employer] [facility] is wondering if clinic received a [employer] [facility] form for pt, please call sb back to clarify at [phone] and her fax number is [phone] thanks     Action Taken: Message routed to:  [facility] endocrine

## 2019-07-29 DIAGNOSIS — E78.5 HYPERLIPIDEMIA WITH TARGET LDL LESS THAN 70: ICD-10-CM

## 2019-07-29 NOTE — TELEPHONE ENCOUNTER
Form received faxed to [facility] [employer] [facility]  [phone].    Called [phone] to verify form was received.

## 2019-07-30 ENCOUNTER — PATIENT OUTREACH (OUTPATIENT)
Dept: CARE COORDINATION | Facility: CLINIC | Age: 74
End: 2019-07-30

## 2019-07-30 DIAGNOSIS — I50.32 CHRONIC DIASTOLIC HEART FAILURE (H): Primary | ICD-10-CM
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
## 2019-07-30 NOTE — PROGRESS NOTES
Clinic Care Coordination Contact    Follow Up Progress Note      Assessment: pro-active outreach call placed to patient. She is doing well. Has appointment with [facility] tomorrow. Using cardioMEMS and will discuss results at the appointment. Has lost 5 lbs since the beginning of the month but thought she would lose more with using bumex. Current weight 321#. Weighs herself daily and writes the numbers down. Did not know that she should contact myself or [facility] with weight gain of more than 2 lbs in a day or 5 lbs in a week that she cannot explain. Agrees to do so now. She is walking more around the house without her cane or walker. Driving in the neighborhood only. Has had fall in her home previously and is alone during the day. Would benefit from [facility] autoalert system. She agrees this would be helpful. She feels there is a problem at the pharmacy for her niacin refill. Previously filled by Dr William but patient now wishes Dr. Connell to consider writing for the medication. Chart reviewed and refill request has been sent to the clinic    Goals addressed this encounter:   Goals Addressed                 This Visit's Progress      # 3 Medical (pt-stated)   On track     Goal Statement:I will prevent a future fall   Measure of Success: No falls  Supportive Steps to Achieve:  I will use cane or walker at all times   I will use lifeline when I go to the bathroom because walker doesn't fit   Barriers:Lightheaded /Dizzy leg weakness   Strengths: outpatient PT/OT 3/6  Date to Achieve By: 7/4/2019  Patient expressed understanding of goal: Yes          #1 Medical (pt-stated)   On track     Goal Statement: I will monitor shortness of breath episodes   Measure of Success: More energy for daily activity   Supportive Steps to Achieve:   I will seek medical help if experiencing increased shortness of breath episodes   I will follow  COPD Action Plan   Barriers: None identified   Strengths: supportive room mate   Date to Achieve  By 7/4/2019  Patient expressed understanding of goal: Yes         Increase physical activity   On track     Goal Statement: I will increase my physical activity  Measure of Success: increased strength, weight loss  Supportive Steps to Achieve: referred to [facility] for safety  Barriers: history of falls; CHF  Strengths: wants to improve  Date to Achieve By: 12-1-19  Patient expressed understanding of goal: yes                 Intervention/Education provided during outreach: as above. Referred to [facility]          Plan:   Keep appointment tomorrow with [facility]. Call myself or [facility] with weight gain as above. Allow the clinic more time to consider the refill. Complex care plan and [facility] information mailed to home.   Care Coordinator will follow up in approximately one month    Vibha Carvajal RN  [facility]-Care Coordination  [facility]  [facility]  [phone]

## 2019-07-31 ENCOUNTER — OFFICE VISIT (OUTPATIENT)
Dept: CARDIOLOGY | Facility: CLINIC | Age: 74
End: 2019-07-31
Attending: INTERNAL MEDICINE
Payer: MEDICARE

## 2019-07-31 VITALS
HEIGHT: 66 IN | OXYGEN SATURATION: 93 % | WEIGHT: 293 LBS | DIASTOLIC BLOOD PRESSURE: 65 MMHG | BODY MASS INDEX: 47.09 KG/M2 | SYSTOLIC BLOOD PRESSURE: 123 MMHG | HEART RATE: 70 BPM

## 2019-07-31 DIAGNOSIS — I50.32 CHRONIC DIASTOLIC HEART FAILURE (H): Chronic | ICD-10-CM

## 2019-07-31 DIAGNOSIS — I50.32 CHRONIC DIASTOLIC HEART FAILURE (H): ICD-10-CM

## 2019-07-31 LAB
ANION GAP SERPL CALCULATED.3IONS-SCNC: 6 MMOL/L (ref 3–14)
BUN SERPL-MCNC: 45 MG/DL (ref 7–30)
CALCIUM SERPL-MCNC: 9.3 MG/DL (ref 8.5–10.1)
CHLORIDE SERPL-SCNC: 103 MMOL/L (ref 94–109)
CO2 SERPL-SCNC: 30 MMOL/L (ref 20–32)
CREAT SERPL-MCNC: 1.98 MG/DL (ref 0.52–1.04)
GFR SERPL CREATININE-BSD FRML MDRD: 24 ML/MIN/{1.73_M2}
GLUCOSE SERPL-MCNC: 177 MG/DL (ref 70–99)
POTASSIUM SERPL-SCNC: 4.1 MMOL/L (ref 3.4–5.3)
SODIUM SERPL-SCNC: 138 MMOL/L (ref 133–144)

## 2019-07-31 PROCEDURE — 99214 OFFICE O/P EST MOD 30 MIN: CPT | Mod: ZP | Performed by: NURSE PRACTITIONER

## 2019-07-31 PROCEDURE — 36415 COLL VENOUS BLD VENIPUNCTURE: CPT | Performed by: NURSE PRACTITIONER

## 2019-07-31 PROCEDURE — G0463 HOSPITAL OUTPT CLINIC VISIT: HCPCS | Mod: ZF

## 2019-07-31 PROCEDURE — 80048 BASIC METABOLIC PNL TOTAL CA: CPT | Performed by: NURSE PRACTITIONER

## 2019-07-31 RX ORDER — POTASSIUM CHLORIDE 750 MG/1
20 TABLET, EXTENDED RELEASE ORAL 2 TIMES DAILY
Qty: 120 TABLET | Refills: 3 | Status: SHIPPED | OUTPATIENT
Start: 2019-07-31 | End: 2019-09-10

## 2019-07-31 RX ORDER — BUMETANIDE 1 MG/1
TABLET ORAL
Qty: 90 TABLET | Refills: 3 | Status: SHIPPED | OUTPATIENT
Start: 2019-07-31 | End: 2019-08-20 | Stop reason: ALTCHOICE

## 2019-07-31 ASSESSMENT — PAIN SCALES - GENERAL: PAINLEVEL: NO PAIN (0)

## 2019-07-31 ASSESSMENT — MIFFLIN-ST. JEOR: SCORE: 1977.8
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
## 2019-07-31 NOTE — NURSING NOTE
Chief Complaint   Patient presents with     Follow Up      Return CORE, 73 yo female, HFpEF, labs prior.      Vitals were taken and medications were reconciled.     Dayanara Thomas RMA  2:44 PM

## 2019-07-31 NOTE — PATIENT INSTRUCTIONS
Take your medicines every day, as directed    Changes made today:  o Please cut back Bumex to 3 mg once daily  o Please reduce potassium to 20 mEq twice daily  o Please have labs drawn within 1 week   Monitor Your Weight and Symptoms    Contact us if you:      Gain 2 pounds in one day or 5 pounds in one week    Feel more short of breath    Notice more leg swelling    Feel lightheadeded   Change your lifestyle    Limit Salt or Sodium:    2000 mg  Limit Fluids:    2000 mL or approximately 64 ounces  Eat a Heart Healthy Diet    Low in saturated fats  Stay Active:    Aim to move at least 150 minutes every  week         To Contact us    During Business Hours:  [phone], option # 1 (University)  Then option # 4 (medical questions)     After hours, weekends or holidays:   [phone], Option #4  Ask to speak to the On-Call Cardiologist. Inform them you are a CORE/heart failure patient at the [facility].     Use [employer] allows you to communicate directly with your heart team through secure messaging.    [employer] can be accessed any time on your phone, computer, or tablet.    If you need assistance, we'd be happy to help!         Keep your Heart Appointments:    Return to clinic in 6 weeks and see Dr. Wolf in 3 months, as scheduled
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
## 2019-08-08 RX ORDER — NIACIN 500 MG/1
TABLET, EXTENDED RELEASE ORAL
Qty: 180 TABLET | Refills: 2 | Status: SHIPPED | OUTPATIENT
Start: 2019-08-08 | End: 2020-03-08

## 2019-08-08 NOTE — TELEPHONE ENCOUNTER
Patient called to inquire about the status of this request. States she is out of medication, and this request hasn't been address in 10 days. Please assist. Thanks!

## 2019-08-08 NOTE — TELEPHONE ENCOUNTER
"Routing refill request to provider for review/approval because:  --This was routed to  on 8/1/19 and somehow not addressed.  --Patient is now out of medication and out of pharmacy's short term supply.    --I see  discontinued niacin on 3/11/19 giving reason \"therapy completed\"   --I asked Mariel if she remembered  discontinuing niacin in March and she said no.    --Looks like in-patient staff changed the sig 5/2/19 on niacin ER (NIASPAN) 500 MG cr TABLET to one tab a day from two tabs a day.  --Pharmacy is requesting two tabs per day.    --I spoke with patient and she says she never used one per day.  She has taken niacin  mg two per day for years.        Last Office Visit: 5/29/2019   Future Office Visit:   None    Requested Prescriptions   Pending Prescriptions Disp Refills     niacin ER (NIASPAN) 500 MG CR tablet [Pharmacy Med Name: NIACIN 500MG ER TABLETS] 60 tablet 0     Sig: TAKE 1 TABLET(500 MG) BY MOUTH TWICE DAILY       Antihyperlipidemic agents Passed - 8/8/2019 11:00 AM        Passed - Lipid panel on file in past 12 mos     Recent Labs   Lab Test 03/13/19  1508  05/05/15  0751   CHOL 170   < > 145   TRIG 118   < > 221*   HDL 71   < > 45*   LDL 76   < > 56   NHDL 99   < >  --    VLDL  --   --  44*   CHOLHDLRATIO  --   --  3.2    < > = values in this interval not displayed.               Passed - Normal serum ALT on record in past 12 mos     Recent Labs   Lab Test 04/09/19  2050   ALT 22             Passed - Recent (12 mo) or future (30 days) visit within the authorizing provider's specialty     Patient had office visit in the last 12 months or has a visit in the next 30 days with authorizing provider or within the authorizing provider's specialty.  See \"Patient Info\" tab in inbasket, or \"Choose Columns\" in Meds & Orders section of the refill encounter.              Passed - Medication is active on med list        Passed - Patient is age 18 years or older        " Passed - No active pregnancy on record        Passed - No positive pregnancy test in past 12 mos

## 2019-08-12 ENCOUNTER — ALLIED HEALTH/NURSE VISIT (OUTPATIENT)
Dept: CARDIOLOGY | Facility: CLINIC | Age: 74
End: 2019-08-12
Attending: NURSE PRACTITIONER
Payer: MEDICARE

## 2019-08-12 DIAGNOSIS — Z53.9 ERRONEOUS ENCOUNTER--DISREGARD: ICD-10-CM

## 2019-08-12 DIAGNOSIS — I50.32 CHRONIC DIASTOLIC HEART FAILURE (H): Primary | Chronic | ICD-10-CM

## 2019-08-12 NOTE — Clinical Note
Charges have been dropped for [employer]Mems billing. Please document and sign visit.   Flaco Foreman, [facility] Heart Failure Admin/Referrals

## 2019-08-13 ENCOUNTER — DOCUMENTATION ONLY (OUTPATIENT)
Dept: CARDIOLOGY | Facility: CLINIC | Age: 74
End: 2019-08-13

## 2019-08-13 NOTE — PROGRESS NOTES
[facility] CORE Clinic - CardioMEMS Reading Review      CardioMEMS reviewed and routed to patient's provider, Starla Saez NP.   Current diuretic: Bumex 3mg in AM and 2 mg in PM  Recent plan of care changes:     Current Threshold parameters:       Today's Waveform:       Readings:           Latonia Hdz RN

## 2019-08-16 DIAGNOSIS — I50.32 CHRONIC DIASTOLIC HEART FAILURE (H): Chronic | ICD-10-CM

## 2019-08-16 LAB — NT-PROBNP SERPL-MCNC: 449 PG/ML (ref 0–125)

## 2019-08-16 PROCEDURE — 83880 ASSAY OF NATRIURETIC PEPTIDE: CPT | Performed by: NURSE PRACTITIONER

## 2019-08-16 PROCEDURE — 80048 BASIC METABOLIC PNL TOTAL CA: CPT | Performed by: NURSE PRACTITIONER

## 2019-08-16 PROCEDURE — 36415 COLL VENOUS BLD VENIPUNCTURE: CPT | Performed by: NURSE PRACTITIONER

## 2019-08-17 LAB
ANION GAP SERPL CALCULATED.3IONS-SCNC: 9 MMOL/L (ref 3–14)
BUN SERPL-MCNC: 38 MG/DL (ref 7–30)
CALCIUM SERPL-MCNC: 9.7 MG/DL (ref 8.5–10.1)
CHLORIDE SERPL-SCNC: 108 MMOL/L (ref 94–109)
CO2 SERPL-SCNC: 25 MMOL/L (ref 20–32)
CREAT SERPL-MCNC: 1.73 MG/DL (ref 0.52–1.04)
GFR SERPL CREATININE-BSD FRML MDRD: 29 ML/MIN/{1.73_M2}
GLUCOSE SERPL-MCNC: 114 MG/DL (ref 70–99)
POTASSIUM SERPL-SCNC: 4.4 MMOL/L (ref 3.4–5.3)
SODIUM SERPL-SCNC: 142 MMOL/L (ref 133–144)

## 2019-08-18 DIAGNOSIS — E11.42 TYPE 2 DIABETES MELLITUS WITH DIABETIC POLYNEUROPATHY, WITHOUT LONG-TERM CURRENT USE OF INSULIN (H): ICD-10-CM

## 2019-08-18 NOTE — TELEPHONE ENCOUNTER
Prescription approved per [facility] Refill Protocol.  Verna Genao RN

## 2019-08-19 NOTE — TELEPHONE ENCOUNTER
"Requested Prescriptions   Pending Prescriptions Disp Refills     metoprolol tartrate (LOPRESSOR) 50 MG tablet  Last Written Prescription Date:  5-29-19  Last Fill Quantity: 180 TAB,  # refills: 3   Last office visit: 5/29/2019 with prescribing provider:  BETTY CONNELL    Future Office Visit:   Next 5 appointments (look out 90 days)    Sep 05, 2019  2:40 PM CDT  SHORT with Betty Connell MD  [facility] ([facility]) [address] [place] [id number]  [phone]       180 tablet 3     Sig: Take 1 tablet (50 mg) by mouth 2 times daily       Beta-Blockers Protocol Passed - 8/19/2019  1:53 PM        Passed - Blood pressure under 140/90 in past 12 months     BP Readings from Last 3 Encounters:   07/31/19 123/65   07/01/19 121/55   06/26/19 116/68           Passed - Patient is age 6 or older        Passed - Recent (12 mo) or future (30 days) visit within the authorizing provider's specialty     Patient had office visit in the last 12 months or has a visit in the next 30 days with authorizing provider or within the authorizing provider's specialty.  See \"Patient Info\" tab in inbasket, or \"Choose Columns\" in Meds & Orders section of the refill encounter.          Passed - Medication is active on med list         "

## 2019-08-20 ENCOUNTER — PATIENT OUTREACH (OUTPATIENT)
Dept: CARDIOLOGY | Facility: CLINIC | Age: 74
End: 2019-08-20

## 2019-08-20 DIAGNOSIS — I50.32 CHRONIC DIASTOLIC HEART FAILURE (H): Chronic | ICD-10-CM

## 2019-08-20 RX ORDER — BUMETANIDE 1 MG/1
2 TABLET ORAL 2 TIMES DAILY
Qty: 360 TABLET | Refills: 3 | Status: SHIPPED | OUTPATIENT
Start: 2019-08-20 | End: 2019-09-10

## 2019-08-20 NOTE — PROGRESS NOTES
"[facility] CORE Clinic - CardioMEMS Reading Review      CardioMEMS reviewed and routed to patient's provider, Starla Saez NP.   Current diuretic: Bumex 3mg daily  Recent plan of care changes: saw Starla on  and bumex was decreased from 3/2 to 3 daily. F/u labs creat improved.     Called Mariel:   Mariel states \"My breathing not feeling good. I still have shortness of breath when moving/walking from the front of the house to the back of the house; approx 70ft.\"  Gotten worse sometimes, sometimes better since Starla decrease Bumex.   \"I wanted to start walking the dog, but I don't feel safe walking the dog with this shortness of breathe.\"    Discussed with Starla Saez NP then called Mariel back. Mariel verbalizes understanding and agrees with plan of care. Serena Streeter RN      Date: 2019  Time of Call: 2:20 PM  Diagnosis:  Heart Failure  VORB - Ordering provider: Starla Saez NP   Order: Increase Bumex to 2mg BID.   Adjust PAD threshold to 16-20.  Order received by: Serena Streeter RN       Current Threshold parameters:       Today's Waveform:       Readings:           Serena Streeter RN BSN CHFN  Cardiology Care Coordinator - [facility]  Questions and [phone]  First press #1 for the [employer] and then press #4 for \"Your Care Team\" to reach us Cardiology Nurses.                   "

## 2019-08-21 RX ORDER — METOPROLOL TARTRATE 50 MG
50 TABLET ORAL 2 TIMES DAILY
Qty: 180 TABLET | Refills: 3 | OUTPATIENT
Start: 2019-08-21

## 2019-08-21 NOTE — TELEPHONE ENCOUNTER
LOV: 5/29/2019    BP Readings from Last 3 Encounters:   07/31/19 123/65   07/01/19 121/55   06/26/19 116/68     Filled for a year at office visit.    Thanks! Milagro Sarah RN

## 2019-08-26 NOTE — TELEPHONE ENCOUNTER
metoprolol tartrate (LOPRESSOR) tablet 25 mg (Discontinued)      Last Written Prescription Date:  7-6-18  Last Fill Quantity: 25 mg,   # refills:    Last Office Visit: 5-29-19  Future Office visit:    Next 5 appointments (look out 90 days)    Sep 05, 2019  2:40 PM CDT  SHORT with Amee Connell MD  [facility] ([facility] [address] [place] [id number] [phone]           Routing refill request to provider for review/approval because:  Drug not active on patient's medication list

## 2019-08-29 DIAGNOSIS — I50.30 (HFPEF) HEART FAILURE WITH PRESERVED EJECTION FRACTION (H): Primary | ICD-10-CM

## 2019-08-29 DIAGNOSIS — I50.32 CHRONIC DIASTOLIC HEART FAILURE (H): Primary | ICD-10-CM

## 2019-08-31 NOTE — TELEPHONE ENCOUNTER
"  bumetanide (BUMEX) 2 MG tablet     Last Written Prescription Date:  5/3/19  Last Fill Quantity: 45,   # refills: 3  Last Office Visit : 7/31/19  Future Office visit: 9/10/19    Routing refill request to provider for review/approval because: pharmacy requests 2 mg tab possibly  r/t cost, he cant tell for sure.. Per note 5/7/19 2 mg discontinued  ? Reason?   \"Mariel has been taking 2 mg of Bumex daily, and took an extra 1 mg yesterday after noting a weight gain\". What is preferred strength?  "

## 2019-09-05 ENCOUNTER — OFFICE VISIT (OUTPATIENT)
Dept: FAMILY MEDICINE | Facility: CLINIC | Age: 74
End: 2019-09-05
Payer: MEDICARE

## 2019-09-05 ENCOUNTER — PATIENT OUTREACH (OUTPATIENT)
Dept: CARDIOLOGY | Facility: CLINIC | Age: 74
End: 2019-09-05

## 2019-09-05 VITALS
RESPIRATION RATE: 16 BRPM | WEIGHT: 293 LBS | HEART RATE: 64 BPM | TEMPERATURE: 97.9 F | DIASTOLIC BLOOD PRESSURE: 60 MMHG | BODY MASS INDEX: 53.26 KG/M2 | SYSTOLIC BLOOD PRESSURE: 118 MMHG

## 2019-09-05 DIAGNOSIS — N89.8 VAGINAL IRRITATION: Primary | ICD-10-CM

## 2019-09-05 PROCEDURE — 99213 OFFICE O/P EST LOW 20 MIN: CPT | Performed by: FAMILY MEDICINE

## 2019-09-05 ASSESSMENT — ANXIETY QUESTIONNAIRES
3. WORRYING TOO MUCH ABOUT DIFFERENT THINGS: SEVERAL DAYS
2. NOT BEING ABLE TO STOP OR CONTROL WORRYING: SEVERAL DAYS
1. FEELING NERVOUS, ANXIOUS, OR ON EDGE: SEVERAL DAYS
6. BECOMING EASILY ANNOYED OR IRRITABLE: MORE THAN HALF THE DAYS
GAD7 TOTAL SCORE: 11
7. FEELING AFRAID AS IF SOMETHING AWFUL MIGHT HAPPEN: MORE THAN HALF THE DAYS
5. BEING SO RESTLESS THAT IT IS HARD TO SIT STILL: MORE THAN HALF THE DAYS

## 2019-09-05 ASSESSMENT — PATIENT HEALTH QUESTIONNAIRE - PHQ9
5. POOR APPETITE OR OVEREATING: MORE THAN HALF THE DAYS
SUM OF ALL RESPONSES TO PHQ QUESTIONS 1-9: 16

## 2019-09-05 NOTE — PROGRESS NOTES
"[facility] CORE Clinic - CardioMEMS Reading Review      CardioMEMS reviewed and routed to patient's provider, Starla Saez NP.   Current diuretic: Bumex 2mg BID since 19    Called and left voicemail to discuss symptoms.  PAD has been within range.  Seeing Starla on 9/10    Current Threshold parameters:       Today's Waveform:       Readings:           Serena Streeter RN BSN CHFN  Cardiology Care Coordinator - [facility]  Questions and [phone]  First press #1 for the [place] and then press #4 for \"Your Care Team\" to reach us Cardiology Nurses.                   "

## 2019-09-05 NOTE — PROGRESS NOTES
Subjective     Mariel Ribeiro is a 74 year old female who presents to clinic today for the following health issues:    HPI   Presents to discuss recurrent perineal irritation as she wears a pad 24/7 and sometimes has urinary leakage contributing to further irritation in the vaginal area.  No vaginal discharge.  Wonders what else she can use to protect her skin in this area.      Patient Active Problem List   Diagnosis     Hypothyroidism      HX PHYSICAL ABUSE     Coronary atherosclerosis     Myalgia and myositis     Migraine     Chronic airway obstruction/ COPD     Mononeuritis     Obstructive sleep apnea     Vitamin D deficiency     Hypertension goal BP (blood pressure) < 130/80     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)     Hyperlipidemia with target LDL less than 70     Chronic diastolic heart failure (H)     Osteoarthritis     Morbid obesity (H)     Arthritis of knee     Transient cerebral ischemia     History of thyroid cancer     Cervicalgia     Fatty liver disease, nonalcoholic     Hepatomegaly     Angina at rest (H)     S/P CABG x 3     Type 2 diabetes mellitus with stage 3 chronic kidney disease, with long-term current use of insulin (H)     Lymphedema     Lower back pain     Bilateral carotid artery disease (H)     Spinal stenosis of lumbar region, unspecified whether neurogenic claudication present     Status post coronary angiogram     Past Surgical History:   Procedure Laterality Date     ANGIOGRAPHY  8/11    with stent placement X2 mid- and proximal LAD     ARTHROPLASTY KNEE  1/28/2014    Procedure: ARTHROPLASTY KNEE;  ARTHROPLASTY KNEE -RIGHT TOTAL  ;  Surgeon: Victor Manuel Wolff MD;  Location: [facility] OR     BYPASS GRAFT ARTERY CORONARY N/A 7/2/2018    Procedure: BYPASS GRAFT ARTERY CORONARY;  Median Sternotomy, On Cardiopulmonary Bypass Pump, Coronary Artery Bypass Graft x 3, using Left Internal Mammary and Endoscopic Vein Pierpont on Bilateral Saphenous Vein, Transesophageal Echocardiogram, Epi-aortic  Ultrasound;  Surgeon: Joao Mason MD;  Location: [facility] OR     C TOTAL KNEE ARTHROPLASTY  7/30/04    Knee Replacement, Total right and left     CATARACT IOL, RT/LT Bilateral      COLONOSCOPY       CV CARDIOMEMS WITH RIGHT HEART CATH N/A 7/1/2019    Procedure: CV CARDIOMEMS;  Surgeon: Michele Arce MD;  Location:  [facility]     CV PULMONARY ANGIOGRAM N/A 7/1/2019    Procedure: Pulmonary Angiogram;  Surgeon: Michele Arce MD;  Location:  [facility]     CV RIGHT HEART CATH N/A 7/1/2019    Procedure: CV RIGHT HEART CATH;  Surgeon: Michele Arce MD;  Location:  [facility]ATH [facility]     DILATION AND CURETTAGE, OPERATIVE HYSTEROSCOPY WITH MORCELLATOR, COMBINED N/A 11/1/2016    Procedure: COMBINED DILATION AND CURETTAGE, OPERATIVE HYSTEROSCOPY WITH MORCELLATOR;  Surgeon: Stacey Arnold DO;  Location: [facility] INTRODUCE NEEDLE/CATH, EXTREMITY ARTERY  1999,2002,2004    Angiocardiogram     HC KNEE SCOPE, DIAGNOSTIC  1990's    Arthroscopy, Knee, bilateral     LAPAROSCOPIC CHOLECYSTECTOMY N/A 8/11/2017    Procedure: LAPAROSCOPIC CHOLECYSTECTOMY;  Laparoscopic Cholecystectomy   *Latex Allergy*, Anesthesia Block;  Surgeon: Jeffrey Roberson MD;  Location: [facility] OR     PHACOEMULSIFICATION CLEAR CORNEA WITH STANDARD INTRAOCULAR LENS IMPLANT Right 12/29/2014    Procedure: PHACOEMULSIFICATION CLEAR CORNEA WITH STANDARD INTRAOCULAR LENS IMPLANT;  Surgeon: Smith Quintana MD;  Location: [facility]     PHACOEMULSIFICATION CLEAR CORNEA WITH TORIC INTRAOCULAR LENS IMPLANT Left 1/12/2015    Procedure: PHACOEMULSIFICATION CLEAR CORNEA WITH TORIC INTRAOCULAR LENS IMPLANT;  Surgeon: Smith Quintana MD;  Location: [facility]     SURGICAL HISTORY OF -   1963    dentures     SURGICAL HISTORY OF -   1985    thyroidectomy     SURGICAL HISTORY OF -   1998    right thumb surgery     SURGICAL HISTORY OF -   2001    right breast biopsy (benign)     SURGICAL HISTORY OF -   04/2004    left shoulder  surgery - rotator cuff     SURGICAL HISTORY OF -       left thumb surgery     THYROIDECTOMY         Social History     Tobacco Use     Smoking status: Former Smoker     Packs/day: 0.00     Years: 27.00     Pack years: 0.00     Types: Cigarettes     Last attempt to quit: 1989     Years since quittin.2     Smokeless tobacco: Never Used   Substance Use Topics     Alcohol use: No     Alcohol/week: 0.0 oz     Family History   Problem Relation Age of Onset     C.A.D. Mother      Diabetes Mother      Hypertension Mother      Blood Disease Mother         multiple episodes of thrombosis     Circulatory Mother         DVT X 2; ocular clot; cerebral; carotid artery stenosis     Glaucoma Mother      Macular Degeneration Mother      C.A.D. Father      Hypertension Father      Cerebrovascular Disease Father      Alcohol/Drug Father         etoh     Cancer Brother         liver,pancreas, brain     Cardiovascular Sister      Hypertension Sister      Hypertension Brother      Alcohol/Drug Sister         etoh     Alcohol/Drug Brother         drug     Diabetes Sister         younger     C.A.D. Sister         CABG age 65     C.A.D. Brother         CABG age 42     C.A.D. Sister         stents age 58     C.A.D. Brother      Genitourinary Problems Sister         kidney disease         Current Outpatient Medications   Medication Sig Dispense Refill     acetaminophen (TYLENOL) 325 MG tablet Take 650 mg by mouth every 4 hours as needed for mild pain Take 2 tablets by mouth every 4 hours as needed for mild pain 100 tablet      albuterol (PROAIR HFA) 108 (90 Base) MCG/ACT inhaler Inhale 1-2 puffs into the lungs every 4 hours as needed for wheezing 8.5 g      aspirin (ASA) 81 MG EC tablet Take 1 tablet (81 mg) by mouth daily       atorvastatin (LIPITOR) 40 MG tablet TAKE 1 TABLET(40 MG) BY MOUTH DAILY 90 tablet 0     bumetanide (BUMEX) 1 MG tablet Take 2 tablets (2 mg) by mouth 2 times daily Or as directed by CORE 360 tablet 3      escitalopram (LEXAPRO) 20 MG tablet Take 1 tablet (20 mg) by mouth every morning 90 tablet 1     ferrous gluconate (FERGON) 324 (38 Fe) MG tablet Take 1 tablet (324 mg) by mouth Every Mon, Wed, Fri Morning 36 tablet 3     folic acid (FOLVITE) 1 MG tablet Take 2 tablets (2 mg) by mouth daily       guaiFENesin (MUCINEX) 600 MG 12 hr tablet Take 1 tablet (600 mg) by mouth 2 times daily       levothyroxine (SYNTHROID/LEVOTHROID) 150 MCG tablet TAKE 1 TABLET(150 MCG) BY MOUTH DAILY 90 tablet 0     losartan (COZAAR) 50 MG tablet Take 1 tablet (50 mg) by mouth every morning AND 0.5 tablets (25 mg) every evening. 135 tablet 3     metoprolol tartrate (LOPRESSOR) 50 MG tablet Take 1 tablet (50 mg) by mouth 2 times daily 180 tablet 3     niacin ER (NIASPAN) 500 MG CR tablet TAKE 1 TABLET(500 MG) BY MOUTH TWICE DAILY 180 tablet 2     nitroGLYcerin (NITROSTAT) 0.4 MG sublingual tablet For chest pain place 1 tablet under the tongue every 5 minutes for 3 doses. If symptoms persist 5 minutes after 1st dose call 911. 25 tablet 0     potassium chloride ER (K-DUR/KLOR-CON M) 10 MEQ CR tablet Take 2 tablets (20 mEq) by mouth 2 times daily 120 tablet 3     pregabalin (LYRICA) 100 MG capsule Take 1 capsule (100 mg) by mouth 2 times daily 180 capsule 3     repaglinide (PRANDIN) 0.5 MG tablet Take 1 tablet (0.5 mg) by mouth 3 times daily (before meals) 270 tablet 3     senna-docusate (SENOKOT-S/PERICOLACE) 8.6-50 MG tablet Take 1-2 tablets by mouth 2 times daily as needed for constipation 30 tablet 0     traZODone (DESYREL) 50 MG tablet TAKE 3 TABLETS(150 MG) BY MOUTH AT BEDTIME 270 tablet 1     vitamin D3 (CHOLECALCIFEROL) 48688 units (250 mcg) capsule Take 1 capsule (10,000 Units) by mouth daily       blood glucose monitoring (NO BRAND SPECIFIED) meter device kit Use to test blood sugar four times daily or as directed. 1 kit 0     diphenhydrAMINE (BENADRYL) 50 MG capsule Take 1 capsule (50 mg) by mouth See Admin Instructions for 2 doses  (Patient not taking: Reported on 7/31/2019) 2 capsule 0     EPINEPHrine (EPIPEN/ADRENACLICK/OR ANY BX GENERIC EQUIV) 0.3 MG/0.3ML injection 2-pack Inject 0.3 mLs (0.3 mg) into the muscle once as needed for anaphylaxis 0.6 mL 0     insulin glargine (LANTUS SOLOSTAR PEN) 100 UNIT/ML pen Inject   22 units  daily subcutaneously call  if blood sugar <70, often >200. 15 mL 3     LIFESCAN FINEPOINT LANCETS MISC Use to test blood sugars 2 times daily or as directed. 100 each prn     ONE TOUCH ULTRA (DEVICES) MISC test blood sugar BID 1 0     ONETOUCH ULTRA test strip USE FOUR TIMES DAILY 400 strip 1     Allergies   Allergen Reactions     Albuterol Other (See Comments)     Sandrita-oral erythema  Confirmed 7/3/18 that patient uses albuterol inhalers at home. Patient had her home inhaler in her bag.     Contrast Dye Anaphylaxis     Fish Allergy Anaphylaxis     Iodine Anaphylaxis     Oxycodone Other (See Comments)     Severe suicidal tendencies on this medication     Tree Nuts [Nuts] Anaphylaxis     Tree nuts only (peanuts ok)     Bactrim      Increased uric acid     Betadine [Povidone Iodine] Hives, Swelling and Difficulty breathing     Betadine     Combivent      Rash     Dulaglutide Other (See Comments)     Hx . Of thyroid cancer.     Fish      Lisinopril Other (See Comments)     Scr/grf severely reduced.      Penicillins Rash     Allopurinol Rash     Latex Rash     Wool Fiber Rash     Recent Labs   Lab Test 08/16/19  1540 07/31/19  1422  04/30/19  0658  04/09/19  2050  03/13/19  1508 02/13/19  1449  12/24/18  1610  09/10/18  0614  08/28/18  1427  07/01/18  0621  03/19/18  1404  03/08/17  1420   A1C  --   --   --  6.5*  --   --   --   --   --   --   --   --  5.2  --   --   --  9.3*  --  8.2*   < > 7.2*   LDL  --   --   --   --   --   --   --  76  --   --   --   --   --   --   --   --   --   --  111*  --  89   HDL  --   --   --   --   --   --   --  71  --   --   --   --   --   --   --   --   --   --  50  --  50   TRIG  --   --    --   --   --   --   --  118  --   --   --   --   --   --   --   --   --   --  212*  --  237*   ALT  --   --   --   --   --  22  --   --  19  --  49  --   --   --   --    < > 26   < > 24   < >  --    CR 1.73* 1.98*   < >  --    < > 1.43*   < > 1.51* 1.42*  --  1.19*   < > 1.48*   < > 1.41*   < >  --    < > 1.28*   < > 1.19*   GFRESTIMATED 29* 24*   < >  --    < > 36*   < > 34* 37*  --  45*   < > 35*   < > 37*   < >  --    < > 41*   < > 45*   GFRESTBLACK 33* 28*   < >  --    < > 42*   < > 39* 42*  --  53*   < > 42*   < > 44*   < >  --    < > 50*   < > 54*   POTASSIUM 4.4 4.1   < >  --    < > 4.1   < > 4.6 4.0   < > 4.5   < > 3.9   < > 4.0   < >  --    < > 3.6   < > 3.8   TSH  --   --   --   --   --   --   --   --   --   --   --   --   --   --  2.97  --   --   --  1.15   < > 0.12*    < > = values in this interval not displayed.      BP Readings from Last 3 Encounters:   09/05/19 118/60   07/31/19 123/65   07/01/19 121/55    Wt Readings from Last 3 Encounters:   09/05/19 149.7 kg (330 lb)   07/31/19 145.6 kg (321 lb)   07/01/19 147.4 kg (325 lb)                    Reviewed and updated as needed this visit by Provider         Review of Systems   ROS COMP: Constitutional, HEENT, cardiovascular, pulmonary, gi and gu systems are negative, except as otherwise noted.      Objective    /60   Pulse 64   Temp 97.9  F (36.6  C) (Tympanic)   Resp 16   Wt 149.7 kg (330 lb)   Breastfeeding? No   BMI 53.26 kg/m    Body mass index is 53.26 kg/m .  Physical Exam   GENERAL: healthy, alert and no distress  EYES: Eyes grossly normal to inspection, PERRL and conjunctivae and sclerae normal  NECK: no adenopathy, no asymmetry, masses, or scars and thyroid normal to palpation  RESP: lungs clear to auscultation - no rales, rhonchi or wheezes  CV: regular rate and rhythm, normal S1 S2, no S3 or S4, no murmur, click or rub, no peripheral edema and peripheral pulses strong  ABDOMEN: soft, nontender, no hepatosplenomegaly, no masses  and bowel sounds normal  MS: no gross musculoskeletal defects noted, no edema  PSYCH: mentation appears normal, affect normal/bright    Diagnostic Test Results:  none         Assessment & Plan     1. Vaginal irritation    - Hydrocortisone (BUTT PASTE, WITH H.C,); Apply as needed for comfort to perineum  Dispense: 90 g; Refill: 1    Discussed importance of trying to keep this area cool and dry as possible- changing the pads frequently if soiled and wearing loose fitting clothes so this area can breathe as well.  Recommend using a barrier cream like butt paste as needed to protect the skin.  Defers  exam with mobility issues.  Defers UA/UC today.    Amee Connell MD  [facility]

## 2019-09-06 ASSESSMENT — ANXIETY QUESTIONNAIRES: GAD7 TOTAL SCORE: 11

## 2019-09-10 ENCOUNTER — OFFICE VISIT (OUTPATIENT)
Dept: CARDIOLOGY | Facility: CLINIC | Age: 74
End: 2019-09-10
Attending: NURSE PRACTITIONER
Payer: MEDICARE

## 2019-09-10 VITALS
OXYGEN SATURATION: 94 % | BODY MASS INDEX: 47.09 KG/M2 | HEIGHT: 66 IN | DIASTOLIC BLOOD PRESSURE: 73 MMHG | SYSTOLIC BLOOD PRESSURE: 125 MMHG | WEIGHT: 293 LBS | HEART RATE: 60 BPM

## 2019-09-10 DIAGNOSIS — I50.32 CHRONIC DIASTOLIC HEART FAILURE (H): Chronic | ICD-10-CM

## 2019-09-10 DIAGNOSIS — I50.30 (HFPEF) HEART FAILURE WITH PRESERVED EJECTION FRACTION (H): ICD-10-CM

## 2019-09-10 LAB
ANION GAP SERPL CALCULATED.3IONS-SCNC: 5 MMOL/L (ref 3–14)
BUN SERPL-MCNC: 31 MG/DL (ref 7–30)
CALCIUM SERPL-MCNC: 9.6 MG/DL (ref 8.5–10.1)
CHLORIDE SERPL-SCNC: 105 MMOL/L (ref 94–109)
CO2 SERPL-SCNC: 29 MMOL/L (ref 20–32)
CREAT SERPL-MCNC: 1.92 MG/DL (ref 0.52–1.04)
GFR SERPL CREATININE-BSD FRML MDRD: 25 ML/MIN/{1.73_M2}
GLUCOSE SERPL-MCNC: 162 MG/DL (ref 70–99)
POTASSIUM SERPL-SCNC: 4.5 MMOL/L (ref 3.4–5.3)
SODIUM SERPL-SCNC: 139 MMOL/L (ref 133–144)

## 2019-09-10 PROCEDURE — 36415 COLL VENOUS BLD VENIPUNCTURE: CPT | Performed by: NURSE PRACTITIONER

## 2019-09-10 PROCEDURE — 99214 OFFICE O/P EST MOD 30 MIN: CPT | Mod: ZP | Performed by: NURSE PRACTITIONER

## 2019-09-10 PROCEDURE — 80048 BASIC METABOLIC PNL TOTAL CA: CPT | Performed by: NURSE PRACTITIONER

## 2019-09-10 PROCEDURE — G0463 HOSPITAL OUTPT CLINIC VISIT: HCPCS | Mod: ZF

## 2019-09-10 RX ORDER — BUMETANIDE 1 MG/1
2 TABLET ORAL 3 TIMES DAILY
Qty: 360 TABLET | Refills: 3 | COMMUNITY
Start: 2019-09-10 | End: 2020-02-18

## 2019-09-10 RX ORDER — POTASSIUM CHLORIDE 750 MG/1
20 TABLET, EXTENDED RELEASE ORAL 3 TIMES DAILY
Qty: 120 TABLET | Refills: 3 | COMMUNITY
Start: 2019-09-10 | End: 2020-04-30

## 2019-09-10 ASSESSMENT — MIFFLIN-ST. JEOR: SCORE: 2036.76

## 2019-09-10 ASSESSMENT — PAIN SCALES - GENERAL: PAINLEVEL: NO PAIN (0)
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
## 2019-09-10 NOTE — NURSING NOTE
Vitals completed successfully and medication reconciled.     Divina Luciano, RAVINDRA  5:23 PM  Chief Complaint   Patient presents with     Follow Up     1 month core

## 2019-09-10 NOTE — PATIENT INSTRUCTIONS
Take your medicines every day, as directed    Changes made today:  o Increase Bumex to 2 mg three times daily  o Increase potassium to 20 mEq three times daily  o Have labs done this Friday   Monitor Your Weight and Symptoms    Contact us if you:      Gain 2 pounds in one day or 5 pounds in one week    Feel more short of breath    Notice more leg swelling    Feel lightheadeded   Change your lifestyle    Limit Salt or Sodium:    2000 mg  Limit Fluids:    2000 mL or approximately 64 ounces  Eat a Heart Healthy Diet    Low in saturated fats  Stay Active:    Aim to move at least 150 minutes every  week         To Contact us    During Business Hours:  [phone], option # 1 (University)  Then option # 4 (medical questions)     After hours, weekends or holidays:   [phone], Option #4  Ask to speak to the On-Call Cardiologist. Inform them you are a CORE/heart failure patient at the [facility].     Use [employer] allows you to communicate directly with your heart team through secure messaging.    [employer] can be accessed any time on your phone, computer, or tablet.    If you need assistance, we'd be happy to help!         Keep your Heart Appointments:    Please schedule labs for Friday 9/13 and add an echo to your visit with Dr. Wolf on 10/30    Call or message with any concerns
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
## 2019-09-12 ENCOUNTER — PATIENT OUTREACH (OUTPATIENT)
Dept: CARE COORDINATION | Facility: CLINIC | Age: 74
End: 2019-09-12

## 2019-09-12 DIAGNOSIS — I50.32 CHRONIC DIASTOLIC HEART FAILURE (H): Chronic | ICD-10-CM
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
## 2019-09-12 DIAGNOSIS — D50.8 IRON DEFICIENCY ANEMIA SECONDARY TO INADEQUATE DIETARY IRON INTAKE: ICD-10-CM

## 2019-09-12 DIAGNOSIS — G47.00 INSOMNIA, UNSPECIFIED TYPE: ICD-10-CM

## 2019-09-12 DIAGNOSIS — I50.32 CHRONIC DIASTOLIC HEART FAILURE (H): Primary | ICD-10-CM

## 2019-09-12 RX ORDER — BUMETANIDE 2 MG/1
2 TABLET ORAL 2 TIMES DAILY
Qty: 60 TABLET | Refills: 3 | OUTPATIENT
Start: 2019-09-12
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
## 2019-09-12 NOTE — PROGRESS NOTES
Clinic Care Coordination Contact  [facility]/Voicemail       Clinical Data: Care Coordinator Outreach  Outreach attempted x 1.  Left message on patient's voicemail with call back information and requested return call.  Plan: Care Coordinator sent care coordination introduction letter on 7-30-19 via mail. Care Coordinator will attempt outreach again within 2 weeks    Vibha Carvajal RN  [facility]-Care Coordination  [facility]  [facility]  [phone]    .

## 2019-09-13 ENCOUNTER — ALLIED HEALTH/NURSE VISIT (OUTPATIENT)
Dept: CARDIOLOGY | Facility: CLINIC | Age: 74
End: 2019-09-13
Attending: NURSE PRACTITIONER
Payer: MEDICARE

## 2019-09-13 DIAGNOSIS — Z53.9 ERRONEOUS ENCOUNTER--DISREGARD: ICD-10-CM

## 2019-09-13 DIAGNOSIS — I50.32 CHRONIC DIASTOLIC HEART FAILURE (H): Primary | Chronic | ICD-10-CM

## 2019-09-13 NOTE — PROGRESS NOTES
"Clinic Care Coordination Contact    Follow Up Progress Note      Assessment: patient called back and left message on my voice mail. Called patient. She had just fallen in her bedroom an hour prior to call. Was sitting in her chair and bending over to look at some shoes and bent over too far and fell forward. Took her an hour to get to a chair. Is resting in bed currently. Was able to get to her phone and call her roommate. Thinks she lost consciousness since all she remembers is her dog was suddenly licking her ear. Fell on her right side and has pain. Has headache and thinks she hit the right side of her head. No dizziness, confusion, nausea, blurred vision, worst headache of her life, etc at this time. Does admit falling \"pretty much everywhere in my house\". Continue to decline Life Line. \"I had Life Alert and I only used it twice in the 12 years I had it\".     Recently went to cardiology for heart failure check. Has been noting weight gain over past couple of months despite no change in diet. bumex dose was increased and potassium added. She notes she has been voiding more but no weight loss. Does weigh self daily. Understands weight gain parameters and when to contact provider    Goals addressed this encounter:   Goals Addressed                 This Visit's Progress      # 3 Medical (pt-stated)   Not on track     1-Goal Statement:I will prevent a future fall   Measure of Success: No falls  Supportive Steps to Achieve:  I will use cane or walker at all times   I will use lifeline when I go to the bathroom because walker doesn't fit   Barriers:Lightheaded /Dizzy leg weakness   Strengths: outpatient PT/OT 3/6  Date to Achieve By: 1-1-20  Patient expressed understanding of goal: Yes          #1 Medical (pt-stated)   On track     2-Goal Statement: I will monitor shortness of breath episodes   Measure of Success: More energy for daily activity   Supportive Steps to Achieve:   I will seek medical help if experiencing " increased shortness of breath episodes   I will follow  COPD Action Plan   Barriers: None identified   Strengths: supportive room mate   Date to Achieve By 1-1-20  Patient expressed understanding of goal: Yes         Increase physical activity   Not on track     3-Goal Statement: I will increase my physical activity  Measure of Success: increased strength, weight loss  Supportive Steps to Achieve: referred to LifeLine for safety  Barriers: history of falls; CHF  Strengths: wants to improve  Date to Achieve By: 1-1-20  Patient expressed understanding of goal: yes                 Intervention/Education provided during outreach: reviewed LifeLine resources again for patient given high falls risk.  Reviewed symptoms that require evaluation in ED due to fall with LOC       Plan:   Will notify PCP. Patient does not feel that she needs to see PCP at this time  Care Coordinator will follow up in approximately one month    Vibha Carvajal RN  [facility]-Care Coordination  [facility]  [facility]  [phone]

## 2019-09-13 NOTE — Clinical Note
Charges have been dropped for [employer] billing. Please document and sign visit.   Flaco Foreman, [facility] Heart Failure Admin/Referrals

## 2019-09-17 NOTE — TELEPHONE ENCOUNTER
Pt states she is still taking this medication and informed writer this has happened last time as well.   She would like the refill.   She also requested ferrous gluconate (FERGON) 324 (38 Fe) MG tablet which has been added to the encounter.       Nicole RETANA     [facility]

## 2019-09-18 RX ORDER — FERROUS GLUCONATE 324(38)MG
324 TABLET ORAL
Qty: 36 TABLET | Refills: 3 | Status: SHIPPED | OUTPATIENT
Start: 2019-09-18 | End: 2020-10-22

## 2019-09-19 RX ORDER — TRAZODONE HYDROCHLORIDE 50 MG/1
TABLET, FILM COATED ORAL
Qty: 270 TABLET | Refills: 1 | Status: SHIPPED | OUTPATIENT
Start: 2019-09-19 | End: 2020-03-08

## 2019-09-23 DIAGNOSIS — I25.10 ATHEROSCLEROSIS OF NATIVE CORONARY ARTERY WITHOUT ANGINA PECTORIS, UNSPECIFIED WHETHER NATIVE OR TRANSPLANTED HEART: ICD-10-CM

## 2019-09-23 DIAGNOSIS — E03.8 OTHER SPECIFIED HYPOTHYROIDISM: ICD-10-CM

## 2019-09-23 NOTE — TELEPHONE ENCOUNTER
"Requested Prescriptions   Pending Prescriptions Disp Refills     atorvastatin (LIPITOR) 40 MG tablet [Pharmacy Med Name: ATORVASTATIN 40MG TABLETS] 90 tablet 0     Sig: TAKE 1 TABLET(40 MG) BY MOUTH DAILY  Last Written Prescription Date:  6/26/19  Last Fill Quantity: 90 tab,  # refills: 0   Last office visit: 9/5/2019 with prescribing provider:  Becki   Future Office Visit:   Next 5 appointments (look out 90 days)    Oct 01, 2019 10:40 AM CDT  SHORT with Amee Connell MD  [facility] [address] [address]  [phone]              Statins Protocol Passed - 9/23/2019 10:09 AM        Passed - LDL on file in past 12 months     Recent Labs   Lab Test 03/13/19  1508   LDL 76             Passed - No abnormal creatine kinase in past 12 months     Recent Labs   Lab Test 09/10/18  0614   CKT 32                Passed - Recent (12 mo) or future (30 days) visit within the authorizing provider's specialty     Patient had office visit in the last 12 months or has a visit in the next 30 days with authorizing provider or within the authorizing provider's specialty.  See \"Patient Info\" tab in inbasket, or \"Choose Columns\" in Meds & Orders section of the refill encounter.              Passed - Medication is active on med list        Passed - Patient is age 18 or older        Passed - No active pregnancy on record        Passed - No positive pregnancy test in past 12 months        SYNTHROID 150 MCG tablet [Pharmacy Med Name: SYNTHROID 0.15MG (150MCG)TABLETS] 90 tablet 0     Sig: TAKE 1 TABLET(150 MCG) BY MOUTH DAILY  Last Written Prescription Date:  6/23/19  Last Fill Quantity: 90 tab,  # refills: 0   Last office visit: 9/5/2019 with prescribing provider:  Becki   Future Office Visit:   Next 5 appointments (look out 90 days)    Oct 01, 2019 10:40 AM CDT  SHORT with Amee Connell MD  [facility] " "([facility]) [address] [address]  [phone]             Thyroid Protocol Failed - 9/23/2019 10:09 AM        Failed - Normal TSH on file in past 12 months     Recent Labs   Lab Test 08/28/18  1427   TSH 2.97              Passed - Patient is 12 years or older        Passed - Recent (12 mo) or future (30 days) visit within the authorizing provider's specialty     Patient had office visit in the last 12 months or has a visit in the next 30 days with authorizing provider or within the authorizing provider's specialty.  See \"Patient Info\" tab in inbasket, or \"Choose Columns\" in Meds & Orders section of the refill encounter.              Passed - Medication is active on med list        Passed - No active pregnancy on record     If patient is pregnant or has had a positive pregnancy test, please check TSH.          Passed - No positive pregnancy test in past 12 months     If patient is pregnant or has had a positive pregnancy test, please check TSH.             "

## 2019-09-24 RX ORDER — ATORVASTATIN CALCIUM 40 MG/1
TABLET, FILM COATED ORAL
Qty: 90 TABLET | Refills: 1 | Status: SHIPPED | OUTPATIENT
Start: 2019-09-24 | End: 2020-03-08

## 2019-09-24 RX ORDER — LEVOTHYROXINE SODIUM 150 MCG
TABLET ORAL
Qty: 30 TABLET | Refills: 0 | Status: SHIPPED | OUTPATIENT
Start: 2019-09-24 | End: 2019-10-05

## 2019-09-25 NOTE — TELEPHONE ENCOUNTER
Medication is being filled for 1 time refill only due to:  Patient needs labs TSH. Future labs ordered TSH.     Signed Prescriptions:                        Disp   Refills    atorvastatin (LIPITOR) 40 MG tablet        90 tab*1        Sig: TAKE 1 TABLET(40 MG) BY MOUTH DAILY  Authorizing Provider: BETTY ALVARADO  Ordering User: ABIMBOLA ANN    SYNTHROID 150 MCG tablet                   30 tab*0        Sig: TAKE 1 TABLET(150 MCG) BY MOUTH DAILY  Authorizing Provider: BETTY ALVARADO  Ordering User: ABIMBOLA ANN     Reception - one month refill on levothyroxine - due for lab work please

## 2019-09-27 ENCOUNTER — PATIENT OUTREACH (OUTPATIENT)
Dept: CARDIOLOGY | Facility: CLINIC | Age: 74
End: 2019-09-27

## 2019-09-27 NOTE — PROGRESS NOTES
"[facility] CORE Clinic - CardioMEMS Reading Review      CardioMEMS reviewed and routed to patient's provider, Starla Saez NP.   Current diuretic:  Bumex 2mg TID  Recent plan of care changes: none    Weight is down too - 330lbs the beginning of the month 323lbs now.  Taking 4mg and 2mg in the afternoon.  Improved SOB with activity; can walk to the front, back then front again to her house.  Abd \"bloating\" less.  Still can't take her dog for a walk, and that's her goal.  Will review with Starla and call Mariel back if plan of care change needed.    Current Threshold parameters:       Today's Waveform:       Readings:           Serena Streeter RN BSN CHFN  Cardiology Care Coordinator - [facility]  Questions and [phone]  First press #1 for the University and then press #4 for \"Your Care Team\" to reach us Cardiology Nurses.                   "

## 2019-09-30 ENCOUNTER — HEALTH MAINTENANCE LETTER (OUTPATIENT)
Age: 74
End: 2019-09-30

## 2019-09-30 ENCOUNTER — PATIENT OUTREACH (OUTPATIENT)
Dept: CARDIOLOGY | Facility: CLINIC | Age: 74
End: 2019-09-30

## 2019-09-30 DIAGNOSIS — I50.32 CHRONIC HEART FAILURE WITH PRESERVED EJECTION FRACTION (H): Primary | ICD-10-CM

## 2019-09-30 NOTE — PROGRESS NOTES
"[facility] CORE Clinic - CardioMEMS Reading Review      CardioMEMS reviewed and routed to patient's provider, Starla Saez NP.   Current diuretic: Bumex 2mg TID  Recent plan of care changes:       Left Mariel a message that we would like to see labs on her this week in light of her CardioMEMS numbers slowly trending down within the last 2 weeks. Left detailed VM.    Date: 2019  Time of Call: 12:39 PM  Diagnosis:  HF  VORB - Ordering provider: Starla Saez NP  Order: BMP  Order received by: eSrena Streeter RN             Current Threshold parameters:       Yesterday's Waveform:       Readings:           Serena Streeter RN BSN CHFN  Cardiology Care Coordinator - [facility]  Questions and [phone]  First press #1 for the [place] and then press #4 for \"Your Care Team\" to reach us Cardiology Nurses.                   "

## 2019-10-01 ENCOUNTER — OFFICE VISIT (OUTPATIENT)
Dept: FAMILY MEDICINE | Facility: CLINIC | Age: 74
End: 2019-10-01
Payer: MEDICARE

## 2019-10-01 VITALS
HEART RATE: 64 BPM | RESPIRATION RATE: 24 BRPM | WEIGHT: 293 LBS | SYSTOLIC BLOOD PRESSURE: 122 MMHG | DIASTOLIC BLOOD PRESSURE: 52 MMHG | TEMPERATURE: 97 F | BODY MASS INDEX: 52.13 KG/M2

## 2019-10-01 DIAGNOSIS — I50.32 CHRONIC HEART FAILURE WITH PRESERVED EJECTION FRACTION (H): ICD-10-CM

## 2019-10-01 DIAGNOSIS — L30.4 INTERTRIGO: ICD-10-CM

## 2019-10-01 DIAGNOSIS — N63.20 LEFT BREAST MASS: Primary | ICD-10-CM

## 2019-10-01 DIAGNOSIS — D24.2 BENIGN NEOPLASM OF LEFT BREAST: ICD-10-CM

## 2019-10-01 DIAGNOSIS — E03.8 OTHER SPECIFIED HYPOTHYROIDISM: ICD-10-CM

## 2019-10-01 LAB
ANION GAP SERPL CALCULATED.3IONS-SCNC: 12 MMOL/L (ref 3–14)
BUN SERPL-MCNC: 40 MG/DL (ref 7–30)
CALCIUM SERPL-MCNC: 9.3 MG/DL (ref 8.5–10.1)
CHLORIDE SERPL-SCNC: 104 MMOL/L (ref 94–109)
CO2 SERPL-SCNC: 23 MMOL/L (ref 20–32)
CREAT SERPL-MCNC: 1.59 MG/DL (ref 0.52–1.04)
GFR SERPL CREATININE-BSD FRML MDRD: 32 ML/MIN/{1.73_M2}
GLUCOSE SERPL-MCNC: 183 MG/DL (ref 70–99)
POTASSIUM SERPL-SCNC: 4 MMOL/L (ref 3.4–5.3)
SODIUM SERPL-SCNC: 139 MMOL/L (ref 133–144)
T4 FREE SERPL-MCNC: 0.97 NG/DL (ref 0.76–1.46)
TSH SERPL DL<=0.005 MIU/L-ACNC: 5.02 MU/L (ref 0.4–4)

## 2019-10-01 PROCEDURE — 84439 ASSAY OF FREE THYROXINE: CPT | Performed by: NURSE PRACTITIONER

## 2019-10-01 PROCEDURE — 99214 OFFICE O/P EST MOD 30 MIN: CPT | Performed by: FAMILY MEDICINE

## 2019-10-01 PROCEDURE — 36415 COLL VENOUS BLD VENIPUNCTURE: CPT | Performed by: NURSE PRACTITIONER

## 2019-10-01 PROCEDURE — 84443 ASSAY THYROID STIM HORMONE: CPT | Performed by: NURSE PRACTITIONER

## 2019-10-01 PROCEDURE — 80048 BASIC METABOLIC PNL TOTAL CA: CPT | Performed by: NURSE PRACTITIONER

## 2019-10-01 NOTE — PROGRESS NOTES
Subjective     Mariel Ribeiro is a 74 year old female who presents to clinic today for the following health issues:    HPI     Presents with LEFT breast mass she noticed in past 2 weeks while checking her intertrigo under LEFT breast.  Notes a palpable mass just LEFT of midsternum where breast tissue attaches.  Slightly tender to palp.  No overlying skin changes.    Continues to struggle with intertrigo under both breasts and itching and irritation in perineum.  Request refill of the Interdry sheets she has been using and reusing.      Due for recheck of TSH today.  This has not been checked in > 1 year. Hx of hypothyroidism on levothyroxine.    Patient Active Problem List   Diagnosis     Hypothyroidism      HX PHYSICAL ABUSE     Coronary atherosclerosis     Myalgia and myositis     Migraine     Chronic airway obstruction/ COPD     Mononeuritis     Obstructive sleep apnea     Vitamin D deficiency     Hypertension goal BP (blood pressure) < 130/80     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)     Hyperlipidemia with target LDL less than 70     Chronic diastolic heart failure (H)     Osteoarthritis     Morbid obesity (H)     Arthritis of knee     Transient cerebral ischemia     History of thyroid cancer     Cervicalgia     Fatty liver disease, nonalcoholic     Hepatomegaly     Angina at rest (H)     S/P CABG x 3     Type 2 diabetes mellitus with stage 3 chronic kidney disease, with long-term current use of insulin (H)     Lymphedema     Lower back pain     Bilateral carotid artery disease (H)     Spinal stenosis of lumbar region, unspecified whether neurogenic claudication present     Status post coronary angiogram     Past Surgical History:   Procedure Laterality Date     ANGIOGRAPHY  8/11    with stent placement X2 mid- and proximal LAD     ARTHROPLASTY KNEE  1/28/2014    Procedure: ARTHROPLASTY KNEE;  ARTHROPLASTY KNEE -RIGHT TOTAL  ;  Surgeon: Victor Manuel Wolff MD;  Location: [facility] OR     BYPASS GRAFT ARTERY CORONARY  N/A 7/2/2018    Procedure: BYPASS GRAFT ARTERY CORONARY;  Median Sternotomy, On Cardiopulmonary Bypass Pump, Coronary Artery Bypass Graft x 3, using Left Internal Mammary and Endoscopic Vein [place] on Bilateral Saphenous Vein, Transesophageal Echocardiogram, Epi-aortic Ultrasound;  Surgeon: Joao Mason MD;  Location: [facility] OR     C TOTAL KNEE ARTHROPLASTY  7/30/04    Knee Replacement, Total right and left     CATARACT IOL, RT/LT Bilateral      COLONOSCOPY       CV CARDIOMEMS WITH RIGHT HEART CATH N/A 7/1/2019    Procedure: CV CARDIOMEMS;  Surgeon: Michele Arce MD;  Location:  HEART CARDIAC CATH LAB     CV PULMONARY ANGIOGRAM N/A 7/1/2019    Procedure: Pulmonary Angiogram;  Surgeon: Michele Arce MD;  [facility]ARDIAC C[facility]     CV RIGHT HEART CATH N/A 7/1/2019    Procedure: CV RIGHT HEART CATH;  Surgeon: Michele Arce MD;  Location:  HEART CARDIAC CATH LAB     DILATION AND CURETTAGE, OPERATIVE HYSTEROSCOPY WITH MORCELLATOR, COMBINED N/A 11/1/2016    Procedure: COMBINED DILATION AND CURETTAGE, OPERATIVE HYSTEROSCOPY WITH MORCELLATOR;  Surgeon: Stacey Arnold DO;  Location: [facility]     HC INTRODUCE NEEDLE/CATH, EXTREMITY ARTERY  1999,2002,2004    Angiocardiogram     HC KNEE SCOPE, DIAGNOSTIC  1990's    Arthroscopy, Knee, bilateral     LAPAROSCOPIC CHOLECYSTECTOMY N/A 8/11/2017    Procedure: LAPAROSCOPIC CHOLECYSTECTOMY;  Laparoscopic Cholecystectomy   *Latex Allergy*, Anesthesia Block;  Surgeon: Jeffrey Roberson MD;  [facility]     PHACOEMULSIFICATION CLEAR CORNEA WITH STANDARD INTRAOCULAR LENS IMPLANT Right 12/29/2014    Procedure: PHACOEMULSIFICATION CLEAR CORNEA WITH STANDARD INTRAOCULAR LENS IMPLANT;  Surgeon: Smith Quintana MD;  Location:  [facility]     PHACOEMULSIFICATION CLEAR CORNEA WITH TORIC INTRAOCULAR LENS IMPLANT Left 1/12/2015    Procedure: PHACOEMULSIFICATION CLEAR CORNEA WITH TORIC INTRAOCULAR LENS IMPLANT;  Surgeon: Smith Quintana MD;  Location:   [facility]     SURGICAL HISTORY OF -       dentures     SURGICAL HISTORY OF -       thyroidectomy     SURGICAL HISTORY OF -       right thumb surgery     SURGICAL HISTORY OF -       right breast biopsy (benign)     SURGICAL HISTORY OF -   2004    left shoulder surgery - rotator cuff     SURGICAL HISTORY OF -       left thumb surgery     THYROIDECTOMY         Social History     Tobacco Use     Smoking status: Former Smoker     Packs/day: 0.00     Years: 27.00     Pack years: 0.00     Types: Cigarettes     Last attempt to quit: 1989     Years since quittin.2     Smokeless tobacco: Never Used   Substance Use Topics     Alcohol use: No     Alcohol/week: 0.0 standard drinks     Family History   Problem Relation Age of Onset     C.A.D. Mother      Diabetes Mother      Hypertension Mother      Blood Disease Mother         multiple episodes of thrombosis     Circulatory Mother         DVT X 2; ocular clot; cerebral; carotid artery stenosis     Glaucoma Mother      Macular Degeneration Mother      C.A.D. Father      Hypertension Father      Cerebrovascular Disease Father      Alcohol/Drug Father         etoh     Cancer Brother         liver,pancreas, brain     Cardiovascular Sister      Hypertension Sister      Hypertension Brother      Alcohol/Drug Sister         etoh     Alcohol/Drug Brother         drug     Diabetes Sister         younger     C.A.D. Sister         CABG age 65     C.A.D. Brother         CABG age 42     C.A.D. Sister         stents age 58     C.A.D. Brother      Genitourinary Problems Sister         kidney disease         Current Outpatient Medications   Medication Sig Dispense Refill     acetaminophen (TYLENOL) 325 MG tablet Take 650 mg by mouth every 4 hours as needed for mild pain Take 2 tablets by mouth every 4 hours as needed for mild pain 100 tablet      albuterol (PROAIR HFA) 108 (90 Base) MCG/ACT inhaler Inhale 1-2 puffs into the lungs every 4 hours as needed for wheezing 8.5  g      aspirin (ASA) 81 MG EC tablet Take 1 tablet (81 mg) by mouth daily       atorvastatin (LIPITOR) 40 MG tablet TAKE 1 TABLET(40 MG) BY MOUTH DAILY 90 tablet 1     blood glucose monitoring (NO BRAND SPECIFIED) meter device kit Use to test blood sugar four times daily or as directed. 1 kit 0     bumetanide (BUMEX) 1 MG tablet Take 2 tablets (2 mg) by mouth 3 times daily Or as directed by CORE 360 tablet 3     diphenhydrAMINE (BENADRYL) 50 MG capsule Take 1 capsule (50 mg) by mouth See Admin Instructions for 2 doses 2 capsule 0     EPINEPHrine (EPIPEN/ADRENACLICK/OR ANY BX GENERIC EQUIV) 0.3 MG/0.3ML injection 2-pack Inject 0.3 mLs (0.3 mg) into the muscle once as needed for anaphylaxis 0.6 mL 0     escitalopram (LEXAPRO) 20 MG tablet Take 1 tablet (20 mg) by mouth every morning 90 tablet 1     ferrous gluconate (FERGON) 324 (38 Fe) MG tablet Take 1 tablet (324 mg) by mouth Every Mon, Wed, Fri Morning 36 tablet 3     folic acid (FOLVITE) 1 MG tablet Take 2 tablets (2 mg) by mouth daily       guaiFENesin (MUCINEX) 600 MG 12 hr tablet Take 1 tablet (600 mg) by mouth 2 times daily       Hydrocortisone (BUTT PASTE, WITH H.C,) Apply as needed for comfort to perineum 90 g 1     insulin glargine (LANTUS SOLOSTAR PEN) 100 UNIT/ML pen Inject   22 units  daily subcutaneously call  if blood sugar <70, often >200. 15 mL 3     Rant NetworkCAN FINEPOINT LANCETS MISC Use to test blood sugars 2 times daily or as directed. 100 each prn     losartan (COZAAR) 50 MG tablet Take 1 tablet (50 mg) by mouth every morning AND 0.5 tablets (25 mg) every evening. 135 tablet 3     metoprolol tartrate (LOPRESSOR) 50 MG tablet Take 1 tablet (50 mg) by mouth 2 times daily 180 tablet 3     niacin ER (NIASPAN) 500 MG CR tablet TAKE 1 TABLET(500 MG) BY MOUTH TWICE DAILY 180 tablet 2     nitroGLYcerin (NITROSTAT) 0.4 MG sublingual tablet For chest pain place 1 tablet under the tongue every 5 minutes for 3 doses. If symptoms persist 5 minutes after 1st dose  "call 911. 25 tablet 0     ONE TOUCH ULTRA (DEVICES) MISC test blood sugar BID 1 0     ONETOUCH ULTRA test strip USE FOUR TIMES DAILY 400 strip 1     potassium chloride ER (K-DUR/KLOR-CON M) 10 MEQ CR tablet Take 2 tablets (20 mEq) by mouth 3 times daily 120 tablet 3     pregabalin (LYRICA) 100 MG capsule Take 1 capsule (100 mg) by mouth 2 times daily 180 capsule 3     repaglinide (PRANDIN) 0.5 MG tablet Take 1 tablet (0.5 mg) by mouth 3 times daily (before meals) 270 tablet 3     senna-docusate (SENOKOT-S/PERICOLACE) 8.6-50 MG tablet Take 1-2 tablets by mouth 2 times daily as needed for constipation 30 tablet 0     Skin Protectants, Misc. (INTERDRY 10\"X36\") SHEE Externally apply 1 Box topically daily 1 sheet under breasts prn intertrigo 1 each 3     Skin Protectants, Misc. (INTERDRY AG TEXTILE 10\"X144\") SHEE Externally apply 1 Box topically daily Apply to areas of intertrigo prn 1 each 3     SYNTHROID 150 MCG tablet TAKE 1 TABLET(150 MCG) BY MOUTH DAILY 30 tablet 0     traZODone (DESYREL) 50 MG tablet TAKE 3 TABLETS(150 MG) BY MOUTH AT BEDTIME 270 tablet 1     vitamin D3 (CHOLECALCIFEROL) 70471 units (250 mcg) capsule Take 1 capsule (10,000 Units) by mouth daily       Allergies   Allergen Reactions     Albuterol Other (See Comments)     Sandrita-oral erythema  Confirmed 7/3/18 that patient uses albuterol inhalers at home. Patient had her home inhaler in her bag.     Contrast Dye Anaphylaxis     Fish Allergy Anaphylaxis     Iodine Anaphylaxis     Oxycodone Other (See Comments)     Severe suicidal tendencies on this medication     Tree Nuts [Nuts] Anaphylaxis     Tree nuts only (peanuts ok)     Bactrim      Increased uric acid     Betadine [Povidone Iodine] Hives, Swelling and Difficulty breathing     Betadine     Combivent      Rash     Dulaglutide Other (See Comments)     Hx . Of thyroid cancer.     Fish      Lisinopril Other (See Comments)     Scr/grf severely reduced.      Penicillins Rash     Allopurinol Rash     " Latex Rash     Wool Fiber Rash     Recent Labs   Lab Test 09/10/19  1707 08/16/19  1540  04/30/19  0658  04/09/19  2050  03/13/19  1508 02/13/19  1449  12/24/18  1610  09/10/18  0614  08/28/18  1427  07/01/18  0621  03/19/18  1404  03/08/17  1420   A1C  --   --   --  6.5*  --   --   --   --   --   --   --   --  5.2  --   --   --  9.3*  --  8.2*   < > 7.2*   LDL  --   --   --   --   --   --   --  76  --   --   --   --   --   --   --   --   --   --  111*  --  89   HDL  --   --   --   --   --   --   --  71  --   --   --   --   --   --   --   --   --   --  50  --  50   TRIG  --   --   --   --   --   --   --  118  --   --   --   --   --   --   --   --   --   --  212*  --  237*   ALT  --   --   --   --   --  22  --   --  19  --  49  --   --   --   --    < > 26   < > 24   < >  --    CR 1.92* 1.73*   < >  --    < > 1.43*   < > 1.51* 1.42*  --  1.19*   < > 1.48*   < > 1.41*   < >  --    < > 1.28*   < > 1.19*   GFRESTIMATED 25* 29*   < >  --    < > 36*   < > 34* 37*  --  45*   < > 35*   < > 37*   < >  --    < > 41*   < > 45*   GFRESTBLACK 29* 33*   < >  --    < > 42*   < > 39* 42*  --  53*   < > 42*   < > 44*   < >  --    < > 50*   < > 54*   POTASSIUM 4.5 4.4   < >  --    < > 4.1   < > 4.6 4.0   < > 4.5   < > 3.9   < > 4.0   < >  --    < > 3.6   < > 3.8   TSH  --   --   --   --   --   --   --   --   --   --   --   --   --   --  2.97  --   --   --  1.15   < > 0.12*    < > = values in this interval not displayed.      BP Readings from Last 3 Encounters:   10/01/19 122/52   09/10/19 125/73   09/05/19 118/60    Wt Readings from Last 3 Encounters:   10/01/19 146.5 kg (323 lb)   09/10/19 (!) 151.5 kg (334 lb)   09/05/19 149.7 kg (330 lb)                    Reviewed and updated as needed this visit by Provider         Review of Systems   ROS COMP: Constitutional, HEENT, cardiovascular, pulmonary, gi and gu systems are negative, except as otherwise noted.      Objective    /52   Pulse 64   Temp 97  F (36.1  C) (Tympanic)    "Resp 24   Wt 146.5 kg (323 lb)   BMI 52.13 kg/m    Body mass index is 52.13 kg/m .  Physical Exam   GENERAL: healthy, alert and no distress  EYES: Eyes grossly normal to inspection, PERRL and conjunctivae and sclerae normal  NECK: no adenopathy, no asymmetry, masses, or scars and thyroid normal to palpation  BREAST: mass left breast measuring 3 cm x 2 cm just LEFT of center off midsternum where tissue attaches, mildly tender, no overlying skin changes.  MS: no gross musculoskeletal defects noted, no edema  SKIN: Irritation noted on B breasts and in perineum  NEURO: Normal speech and mentation  PSYCH: Normal mood and affect          Assessment & Plan     1. Left breast mass  2. Benign neoplasm of left breast    - MA Diagnostic Digital Bilateral; Future  - US Breast Left Complete 4 Quadrants; Future    Sent to Breast Clinic for diagnostic mammogram with U/S of LEFT breast for further eval of palpable mass.    3. Intertrigo    - Skin Protectants, Misc. (INTERDRY 10\"X36\") SHEE; Externally apply 1 Box topically daily 1 sheet under breasts prn intertrigo  Dispense: 1 each; Refill: 3  - Skin Protectants, Misc. (INTERDRY AG TEXTILE 10\"X144\") SHEE; Externally apply 1 Box topically daily Apply to areas of intertrigo prn  Dispense: 1 each; Refill: 3    Continue with INTERDRY sheets to keep area under breasts and skin folds of abdomen clean and dry and free of INTERTRIGO.  Continue with butt paste for perineal irritation from incontinence pads.  Follow-up if no change or worsening symptoms today.    4. Chronic heart failure with preserved ejection fraction (H)    - Basic metabolic panel    5. Other specified hypothyroidism    - TSH with free T4 reflex    Due for recheck today.    Amee Connell MD  [facility]    "

## 2019-10-05 DIAGNOSIS — E03.8 OTHER SPECIFIED HYPOTHYROIDISM: ICD-10-CM

## 2019-10-05 RX ORDER — LEVOTHYROXINE SODIUM 150 UG/1
150 TABLET ORAL DAILY
Qty: 90 TABLET | Refills: 3 | Status: SHIPPED | OUTPATIENT
Start: 2019-10-05 | End: 2020-12-01

## 2019-10-10 ENCOUNTER — ALLIED HEALTH/NURSE VISIT (OUTPATIENT)
Dept: CARDIOLOGY | Facility: CLINIC | Age: 74
End: 2019-10-10
Attending: NURSE PRACTITIONER
Payer: MEDICARE

## 2019-10-10 DIAGNOSIS — I50.32 CHRONIC HEART FAILURE WITH PRESERVED EJECTION FRACTION (H): Primary | ICD-10-CM

## 2019-10-10 DIAGNOSIS — I50.32 CHRONIC DIASTOLIC HEART FAILURE (H): ICD-10-CM

## 2019-10-10 PROCEDURE — 93264 REM MNTR WRLS P-ART PRS SNR: CPT | Mod: ZP | Performed by: PHYSICIAN ASSISTANT

## 2019-10-10 NOTE — Clinical Note
Charges have been dropped for [employer] billing. Please document and sign visit.   Flaoc Foreman, [facility] Heart Failure Admin/Referrals

## 2019-10-10 NOTE — Clinical Note
Charges have been dropped for [employer] billing. Please document and sign visit.   Flaco Foreman, [facility] Heart Failure Admin/Referrals

## 2019-10-14 ENCOUNTER — OFFICE VISIT (OUTPATIENT)
Dept: SLEEP MEDICINE | Facility: CLINIC | Age: 74
End: 2019-10-14
Payer: MEDICARE

## 2019-10-14 DIAGNOSIS — G47.33 OSA TREATED WITH BIPAP: ICD-10-CM

## 2019-10-14 DIAGNOSIS — G47.33 OSA (OBSTRUCTIVE SLEEP APNEA): ICD-10-CM

## 2019-10-15 ENCOUNTER — OFFICE VISIT (OUTPATIENT)
Dept: SLEEP MEDICINE | Facility: CLINIC | Age: 74
End: 2019-10-15
Payer: MEDICARE

## 2019-10-15 ENCOUNTER — DOCUMENTATION ONLY (OUTPATIENT)
Dept: SLEEP MEDICINE | Facility: CLINIC | Age: 74
End: 2019-10-15
Payer: MEDICARE

## 2019-10-15 VITALS
OXYGEN SATURATION: 95 % | RESPIRATION RATE: 18 BRPM | BODY MASS INDEX: 47.09 KG/M2 | HEART RATE: 62 BPM | HEIGHT: 66 IN | DIASTOLIC BLOOD PRESSURE: 52 MMHG | SYSTOLIC BLOOD PRESSURE: 136 MMHG | WEIGHT: 293 LBS

## 2019-10-15 DIAGNOSIS — G47.33 OSA TREATED WITH BIPAP: Primary | ICD-10-CM

## 2019-10-15 DIAGNOSIS — G47.33 OSA (OBSTRUCTIVE SLEEP APNEA): Primary | ICD-10-CM

## 2019-10-15 PROCEDURE — 99214 OFFICE O/P EST MOD 30 MIN: CPT | Performed by: INTERNAL MEDICINE

## 2019-10-15 ASSESSMENT — MIFFLIN-ST. JEOR: SCORE: 2032.23
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
## 2019-10-15 NOTE — PATIENT INSTRUCTIONS

## 2019-10-15 NOTE — PROGRESS NOTES
Pt returned MANISHA device. It was downloaded and forwarded data to the provider for interpretation.

## 2019-10-16 ENCOUNTER — DOCUMENTATION ONLY (OUTPATIENT)
Dept: SLEEP MEDICINE | Facility: CLINIC | Age: 74
End: 2019-10-16
Payer: MEDICARE

## 2019-10-16 ENCOUNTER — PATIENT OUTREACH (OUTPATIENT)
Dept: CARDIOLOGY | Facility: CLINIC | Age: 74
End: 2019-10-16

## 2019-10-16 NOTE — PROGRESS NOTES
"[facility]ORE [facility] - CardioMEMS Reading Review      CardioMEMS reviewed and routed to patient's provider, Starla Saez NP.   Current diuretic: Bumex 2mg TID      Called Mariel to remind her to do her readings daily.  She has not done a reading since 10/13      Current Threshold parameters:       Today's Waveform:   Hasn't done reading since 10/13    Readings:         Serena Streeter RN BSN CHFN  Cardiology Care Coordinator - [facility]  Questions and [phone]  First press #1 for the University and then press #4 for \"Your Care Team\" to reach us Cardiology Nurses.                   "

## 2019-10-16 NOTE — Clinical Note
MANISHA has been downloaded and ready for interpretation. Report has been placed in providers folder.[facility] - [place]

## 2019-10-16 NOTE — PROGRESS NOTES
Overnight oximetry returned to clinic today 10/16/2019 and results have been received. The encounter was routed to the provider for review. Copy of these results have also been scanned into chart.    Recording date: 10/15/2019 at 22:44:53    Duration: 08:54:00    Time (min) Spent Below 88%: 10.1    Completed with revised BiPAP settings    Jose Rivas  Sleep Clinic Specialist - [place]

## 2019-10-17 ENCOUNTER — PATIENT OUTREACH (OUTPATIENT)
Dept: CARE COORDINATION | Facility: CLINIC | Age: 74
End: 2019-10-17

## 2019-10-17 ENCOUNTER — TELEPHONE (OUTPATIENT)
Dept: CARE COORDINATION | Facility: CLINIC | Age: 74
End: 2019-10-17

## 2019-10-17 DIAGNOSIS — N63.0 BREAST MASS: Primary | ICD-10-CM

## 2019-10-17 NOTE — TELEPHONE ENCOUNTER
Orders are in her chart.     Mammogram scheduled on 10/25 at 2 pm before her Endo appt.    LM informing patient that appt was scheduled. If she needs to reschedule for a different day and time she would have to call Cristian at the [facility] , ph# to reschedule diagnostic mammogram [phone].    Camille Christine

## 2019-10-22 ENCOUNTER — DOCUMENTATION ONLY (OUTPATIENT)
Dept: SLEEP MEDICINE | Facility: CLINIC | Age: 74
End: 2019-10-22

## 2019-10-22 NOTE — PROGRESS NOTES
Patient came to [facility] for mask fitting appointment on October 22, 2019. Patient requested to switch masks because poor seal/leak. Patient tried on the following masks: N20, SIMPLUS FFM,F30 Patient selected a Resmed Mask name: F30 (S) Full Face mask size Small. Patient also received tubing, filters and water chamber.

## 2019-10-23 ENCOUNTER — HOSPITAL ENCOUNTER (EMERGENCY)
Facility: CLINIC | Age: 74
Discharge: HOME | End: 2019-10-23
Attending: EMERGENCY MEDICINE | Admitting: EMERGENCY MEDICINE
Payer: MEDICARE

## 2019-10-23 VITALS
HEART RATE: 61 BPM | DIASTOLIC BLOOD PRESSURE: 50 MMHG | SYSTOLIC BLOOD PRESSURE: 145 MMHG | OXYGEN SATURATION: 98 % | RESPIRATION RATE: 18 BRPM

## 2019-10-23 DIAGNOSIS — G89.29 CHRONIC LEFT SHOULDER PAIN: ICD-10-CM

## 2019-10-23 DIAGNOSIS — F32.A DEPRESSION, UNSPECIFIED DEPRESSION TYPE: ICD-10-CM

## 2019-10-23 DIAGNOSIS — F43.21 ADJUSTMENT DISORDER WITH DEPRESSED MOOD: ICD-10-CM

## 2019-10-23 DIAGNOSIS — M25.512 CHRONIC LEFT SHOULDER PAIN: ICD-10-CM

## 2019-10-23 PROCEDURE — 99285 EMERGENCY DEPT VISIT HI MDM: CPT | Mod: 25 | Performed by: EMERGENCY MEDICINE

## 2019-10-23 PROCEDURE — 99284 EMERGENCY DEPT VISIT MOD MDM: CPT | Mod: Z6 | Performed by: EMERGENCY MEDICINE

## 2019-10-23 PROCEDURE — 90791 PSYCH DIAGNOSTIC EVALUATION: CPT

## 2019-10-23 PROCEDURE — 25000132 ZZH RX MED GY IP 250 OP 250 PS 637: Mod: GY | Performed by: EMERGENCY MEDICINE

## 2019-10-23 RX ORDER — TRAMADOL HYDROCHLORIDE 50 MG/1
50 TABLET ORAL ONCE
Status: COMPLETED | OUTPATIENT
Start: 2019-10-23 | End: 2019-10-23

## 2019-10-23 RX ADMIN — TRAMADOL HYDROCHLORIDE 50 MG: 50 TABLET, FILM COATED ORAL at 15:59

## 2019-10-23 ASSESSMENT — ENCOUNTER SYMPTOMS
ARTHRALGIAS: 1
DYSPHORIC MOOD: 1
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
## 2019-10-23 NOTE — ED NOTES
Bed: [facility]  Expected date:   Expected time:   Means of arrival:   Comments:  [place] [facility]   74 yr old psych eval

## 2019-10-23 NOTE — ED NOTES
Patient and visitor questioning when they will see the . Asking if they can leave if the  doesn't see them soon. Explained that the  would be in the room very soon and if they wanted to leave before seeing the  the MD would need to speak with them first.

## 2019-10-23 NOTE — ED NOTES
Sign out note: Mariel Ribeiro is a 74 year old female was signed out to me by Dr. Agarwal at 1605 pm.  Please see initial dictation for full details and history.  Patient presents with depression and concern for suicidal ideation .  She has increased shoulder pain after being weaned off narcotics.  Dr. Agarwal has treated her with a dose of tramadol for her shoulder pain..  Plan at time of sign out is have the patient undergo a [facility] assessment     Course in the ED:  The patient was seen by [facility]  Liudmila at approximately 1730 pm.  Please see her documentation for details.  Briefly the patient is not suicidal.  She states her friend misunderstood her concern about her pills.  She apparently overslept and her roommate was unable to help her lay out her pills.  She became frustrated and told another friend that she just needed her pills.  She did not intend suicidal harm to herself and she is not suicidal.  She has many outpatient services available to her and she does have a therapist.  She is currently here with a friend who is willing to take her home.  She will be discharged from the ED.  Liudmila of the [facility] has given her home health care resources.    Clinical impression: #1: Depression #2 chronic left shoulder pain 3 adjustment disorder with depressed mood.          This note was created in part by the use of Dragon voice recognition dictation system. Inadvertent grammatical errors and typographical errors may still exist.  MD Tavo Bassett Alda L, MD  10/23/19 1938

## 2019-10-23 NOTE — ED NOTES
I have performed an in person assessment of the patient. Based on this assessment the patient no longer requires a one on one attendant at this point in time.    Jostin Agarwal,   3:04 PM  October 23, 2019         Jostin Agarwal,   10/23/19 1509

## 2019-10-23 NOTE — ED AVS SNAPSHOT
[facility], [place], Emergency Department  [address]  [facility] [address]  Phone:  [phone]  Fax:  [phone]                                    Mariel Ribeiro   MRN: [id number]    Department:  [facility], Emergency Department   Date of Visit:  10/23/2019           After Visit Summary Signature Page    I have received my discharge instructions, and my questions have been answered. I have discussed any challenges I see with this plan with the nurse or doctor.    ..........................................................................................................................................  Patient/Patient Representative Signature      ..........................................................................................................................................  Patient Representative Print Name and Relationship to Patient    ..................................................               ................................................  Date                                   Time    ..........................................................................................................................................  Reviewed by Signature/Title    ...................................................              ..............................................  Date                                               Time          22EPIC Rev 08/18

## 2019-10-24 DIAGNOSIS — I50.32 CHRONIC DIASTOLIC HEART FAILURE (H): Primary | Chronic | ICD-10-CM

## 2019-10-25 ENCOUNTER — ANCILLARY PROCEDURE (OUTPATIENT)
Dept: MAMMOGRAPHY | Facility: CLINIC | Age: 74
End: 2019-10-25
Attending: FAMILY MEDICINE
Payer: MEDICARE

## 2019-10-25 DIAGNOSIS — N63.20 LEFT BREAST MASS: ICD-10-CM

## 2019-10-25 DIAGNOSIS — D24.2 BENIGN NEOPLASM OF LEFT BREAST: ICD-10-CM

## 2019-10-28 ENCOUNTER — PATIENT OUTREACH (OUTPATIENT)
Dept: CARE COORDINATION | Facility: CLINIC | Age: 74
End: 2019-10-28

## 2019-10-28 DIAGNOSIS — F32.4 MAJOR DEPRESSION IN PARTIAL REMISSION (H): Primary | ICD-10-CM

## 2019-10-28 NOTE — PROGRESS NOTES
"Clinic Care Coordination Contact    Follow Up Progress Note      Assessment: patient seen in [facility] for assessment after having suicidal ideation and unable to contract for safety. Patient stating she feels safe at this time. Advised should be seen by PCP for medication adjustment as she does not feel that lexapro is doing \"anything\". Patient with a lot of doctor appointments this week and doesn't feel she can make it into clinic.     Goals addressed this encounter:   Goals Addressed                 This Visit's Progress      Mental Health Management (pt-stated)        Goal Statement: my depression symptoms will improve  Measure of Success: improvement in depression symptoms  Supportive Steps to Achieve: RN and RN CC support  Barriers: history of SI  Strengths: willing to comply  Date to Achieve By: 2-2-20  Patient expressed understanding of goal: yes                 Intervention/Education provided during outreach: [email or web address] message sent to PCP asking if she allows phone visits          Plan:   Will wait for response from PCP to see if patient can do a phone visit with her. Patient declines E-visit.     Vibha Carvajal RN  [facility]-Care Coordination  [facility]  [facility]  [phone]        "

## 2019-10-29 ENCOUNTER — ANCILLARY PROCEDURE (OUTPATIENT)
Dept: CARDIOLOGY | Facility: CLINIC | Age: 74
End: 2019-10-29
Attending: NURSE PRACTITIONER
Payer: MEDICARE

## 2019-10-29 DIAGNOSIS — I50.32 CHRONIC DIASTOLIC HEART FAILURE (H): Chronic | ICD-10-CM

## 2019-10-29 RX ADMIN — Medication 5 ML: at 15:30

## 2019-10-29 NOTE — PROGRESS NOTES
Clinic Care Coordination Contact  Care Team Conversations    PCP did not respond. Sent lync to triage nurse, Opal, who states provider does phone visits. Sent message to TC to schedule phone visit with patient.     Vibha Carvajal RN  [facility]-Care Coordination  [facility]  [facility]  [phone]

## 2019-10-30 ENCOUNTER — OFFICE VISIT (OUTPATIENT)
Dept: CARDIOLOGY | Facility: CLINIC | Age: 74
End: 2019-10-30
Attending: INTERNAL MEDICINE
Payer: MEDICARE

## 2019-10-30 VITALS
BODY MASS INDEX: 47.09 KG/M2 | SYSTOLIC BLOOD PRESSURE: 136 MMHG | HEART RATE: 68 BPM | DIASTOLIC BLOOD PRESSURE: 78 MMHG | WEIGHT: 293 LBS | OXYGEN SATURATION: 96 % | HEIGHT: 66 IN

## 2019-10-30 DIAGNOSIS — I50.32 CHRONIC DIASTOLIC HEART FAILURE (H): Chronic | ICD-10-CM

## 2019-10-30 LAB
ANION GAP SERPL CALCULATED.3IONS-SCNC: 5 MMOL/L (ref 3–14)
BUN SERPL-MCNC: 37 MG/DL (ref 7–30)
CALCIUM SERPL-MCNC: 9.7 MG/DL (ref 8.5–10.1)
CHLORIDE SERPL-SCNC: 104 MMOL/L (ref 94–109)
CO2 SERPL-SCNC: 30 MMOL/L (ref 20–32)
CREAT SERPL-MCNC: 1.84 MG/DL (ref 0.52–1.04)
GFR SERPL CREATININE-BSD FRML MDRD: 27 ML/MIN/{1.73_M2}
GLUCOSE SERPL-MCNC: 179 MG/DL (ref 70–99)
POTASSIUM SERPL-SCNC: 4.4 MMOL/L (ref 3.4–5.3)
SODIUM SERPL-SCNC: 139 MMOL/L (ref 133–144)

## 2019-10-30 PROCEDURE — 80048 BASIC METABOLIC PNL TOTAL CA: CPT | Performed by: INTERNAL MEDICINE

## 2019-10-30 PROCEDURE — 36415 COLL VENOUS BLD VENIPUNCTURE: CPT | Performed by: INTERNAL MEDICINE

## 2019-10-30 PROCEDURE — G0463 HOSPITAL OUTPT CLINIC VISIT: HCPCS | Mod: ZF

## 2019-10-30 PROCEDURE — 99214 OFFICE O/P EST MOD 30 MIN: CPT | Mod: ZP | Performed by: INTERNAL MEDICINE

## 2019-10-30 RX ORDER — LOSARTAN POTASSIUM 50 MG/1
50 TABLET ORAL 2 TIMES DAILY
Qty: 135 TABLET | Refills: 3 | Status: SHIPPED | OUTPATIENT
Start: 2019-10-30 | End: 2020-02-18

## 2019-10-30 ASSESSMENT — PAIN SCALES - GENERAL: PAINLEVEL: NO PAIN (0)

## 2019-10-30 ASSESSMENT — MIFFLIN-ST. JEOR
SCORE: 2030.41
SCORE: 2030.41

## 2019-10-30 NOTE — PATIENT INSTRUCTIONS
Cardiology Providers you saw during your visit:  Dr. Wolf     Medication changes:  1- Increase losartan to 50mg in the morning and 50mg in the evening.      Follow up:  1- Have labs drawn on November 6th when you are on campus.   2 - Return to see [facility] clinic in 1 month.  3 - Return to see Dr. Wolf in 6 months.      Results for ROSY PALMER (MRN [id number]) as of 10/30/2019 16:05   Ref. Range 10/30/2019 15:37   Sodium Latest Ref Range: 133 - 144 mmol/L 139   Potassium Latest Ref Range: 3.4 - 5.3 mmol/L 4.4   Chloride Latest Ref Range: 94 - 109 mmol/L 104   Carbon Dioxide Latest Ref Range: 20 - 32 mmol/L 30   Urea Nitrogen Latest Ref Range: 7 - 30 mg/dL 37 (H)   Creatinine Latest Ref Range: 0.52 - 1.04 mg/dL 1.84 (H)   GFR Estimate Latest Ref Range: >60 mL/min/1.73_m2 27 (L)   GFR Estimate If Black Latest Ref Range: >60 mL/min/1.73_m2 31 (L)   Calcium Latest Ref Range: 8.5 - 10.1 mg/dL 9.7   Anion Gap Latest Ref Range: 3 - 14 mmol/L 5   Glucose Latest Ref Range: 70 - 99 mg/dL 179 (H)       Please call if you have:  1. Weight gain of more than 2 pounds in a day or 5 pounds in a week  2. Increased shortness of breath, swelling or bloating  3. Dizziness, lightheadedness   4. Any questions or concerns.      Follow the American Heart Association Diet and Lifestyle recommendations:  Limit saturated fat, trans fat, sodium, red meat, sweets and sugar-sweetened beverages. If you choose to eat red meat, compare labels and select the leanest cuts available.  Aim for at least 150 minutes of moderate physical activity or 75 minutes of vigorous physical activity - or an equal combination of both - each week.     During business hours: [phone], press option # 1 to be routed to the [facility] then option # 4 to send a message to your care team     After hours, weekends or holidays: On Call Cardiologist- [phone]   option #4 and ask to speak to the on-call Cardiologist. Inform them you are a CORE/heart failure  patient at the [facility].     Shayna Newell RN BSN  Cardiology Nurse Care Coordinator     Keep up the good work!     Take Care!

## 2019-10-30 NOTE — NURSING NOTE
Chief Complaint   Patient presents with     Follow Up     74 year old female presents with chronic diastolic heart failure for 4 month follow up with labs and echo prior     Sharyn Fletcher CMA
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
## 2019-10-31 ENCOUNTER — OFFICE VISIT (OUTPATIENT)
Dept: ENDOCRINOLOGY | Facility: CLINIC | Age: 74
End: 2019-10-31
Payer: MEDICARE

## 2019-10-31 VITALS
DIASTOLIC BLOOD PRESSURE: 47 MMHG | BODY MASS INDEX: 47.09 KG/M2 | HEIGHT: 66 IN | WEIGHT: 293 LBS | HEART RATE: 66 BPM | SYSTOLIC BLOOD PRESSURE: 99 MMHG

## 2019-10-31 DIAGNOSIS — Z79.4 TYPE 2 DIABETES MELLITUS WITH STAGE 3 CHRONIC KIDNEY DISEASE, WITH LONG-TERM CURRENT USE OF INSULIN (H): Primary | ICD-10-CM

## 2019-10-31 DIAGNOSIS — E11.22 TYPE 2 DIABETES MELLITUS WITH STAGE 3 CHRONIC KIDNEY DISEASE, WITH LONG-TERM CURRENT USE OF INSULIN (H): Primary | ICD-10-CM

## 2019-10-31 DIAGNOSIS — N18.30 TYPE 2 DIABETES MELLITUS WITH STAGE 3 CHRONIC KIDNEY DISEASE, WITH LONG-TERM CURRENT USE OF INSULIN (H): Primary | ICD-10-CM

## 2019-10-31 LAB — HBA1C MFR BLD: 7.2 % (ref 4.3–6)

## 2019-10-31 RX ORDER — REPAGLINIDE 0.5 MG/1
0.5 TABLET ORAL
Qty: 270 TABLET | Refills: 3 | Status: SHIPPED | OUTPATIENT
Start: 2019-10-31 | End: 2019-12-03 | Stop reason: DRUGHIGH

## 2019-10-31 RX ORDER — FLASH GLUCOSE SENSOR
1 KIT MISCELLANEOUS
Qty: 6 EACH | Refills: 3 | Status: SHIPPED | OUTPATIENT
Start: 2019-10-31 | End: 2020-02-27

## 2019-10-31 RX ORDER — FLASH GLUCOSE SCANNING READER
1 EACH MISCELLANEOUS
Qty: 1 DEVICE | Refills: 0 | Status: SHIPPED | OUTPATIENT
Start: 2019-10-31 | End: 2020-02-27

## 2019-10-31 RX ORDER — CAPSAICIN 0.025 %
1 CREAM (GRAM) TOPICAL 3 TIMES DAILY
Qty: 60 G | Refills: 1 | Status: SHIPPED | OUTPATIENT
Start: 2019-10-31 | End: 2022-05-31

## 2019-10-31 ASSESSMENT — PAIN SCALES - GENERAL: PAINLEVEL: EXTREME PAIN (8)

## 2019-10-31 ASSESSMENT — MIFFLIN-ST. JEOR: SCORE: 2027.69

## 2019-10-31 NOTE — PATIENT INSTRUCTIONS
It is good to see you today.     Please increase your Lantus to 30 units daily.  If your blood sugar is still always >160 in the morning, please add one unit Lantus each week until blood sugar is 100 - 180 fasting.  Please do not add any more units if you see any blood sugars <100 and call me right away of any <70.    Please continue to take Prandin with each meal.     Please try capsaicin cream for your neuropathic pain.    My best wishes,    Muriel Will PA-C, MPAS  [facility]  Diabetes, Endocrinology, and Metabolism  [phone] Appointments/Nurse  [phone] Fax  [phone] nurse line  [phone] URGENTafter hours/weekend Endocrinologist on call

## 2019-10-31 NOTE — LETTER
10/31/2019       RE: Mariel Ribeiro  [address] [place] [address]     Dear Colleague,    Thank you for referring your patient, Mariel Ribeiro, to the [facility] ENDOCRINOLOGY at [facility]. Please see a copy of my visit note below.    [facility]  Endocrinology  Muriel Will PA-C  10/31/2019      Chief Complaint:   Diabetes    History of Present Illness:   Mariel Ribeiro is a 74 year old female with a history of type 2 diabetes mellitus, kidney failure, TIA, CHF, CAD status post CABG, COPD, thyroid cancer status post thyroidectomy and resultant hypothyroidism, and hypertension who presents for follow up of diabetes. She was diagnosed with type 2 diabetes mellitus in 2007. Initially managed with oral Metformin, Januvia, and Glimepiride, all have been discontinued due to declining renal fucntion. She was well managed with NPH 28 units bid and Prandin with meals.    Mariel presented to the ED 4/25/2019 with weight gain and shortness of breath. In the past Mariel has been admitted for acute on chronic heart failure with preserved ejection fraction. She was noted to have an ejection fraction of 23-40%, down from baseline of 40-45%. Mariel was maintained on Bumex this admission and received 1 additional dose of metolazone 05/01/2019.  She was diuresed a total of 13L and 8kg.  She was discharged to home on 2mg oral Bumex once daily, daily weights and follow-up in [facility].     NPH and Prandin were held on admission. Inpatient diabetes care used a sliding scale which kept her within normal limits ( with most 100-160) On dishcarge she was instructed to hold NPH , and continue 0.5 mg Prandin with meals. Creatinin was elevated above baseline of 1.4-1.5, 1.86 at time of discharge. GFR was also declined to 28-32 during hospitalization down from 39 - 42 earlier this year.  4/30/19 A1c 6.5%, hemoglobin at the time was slightly low at 11.2. Since discharge she reported   "feeling \"barely fair\", had not been on any insulin since discharge, but continued on 0.5mg Prandin before meals.     During her previous visit on 5/7/19, her A1c was 6.5. She was told to start 20 units of long acting insulin once daily if this was covered by insurance. GLP-1 was considered for meal coverage due to concerns of poor memory with short acting insulin use. If long acting insulin is not covered, plan was to resume NPH 10 units twice daily though this has led to a lot of eating to maintain BG and weight loss is desired.      Her A1c today is 7.2. She has no glucometer but reports has had episodes of hyperglycemia in the 200-300 at morning and at night. She is checking BG 2-3 times/day and the lowest value she's seen recently is 170. She is currently taking Prandin with meals and is taking 26 units of Lantus once a day, but increased her does to 28-30 while her blood sugars were in the 300s. She has intermittent sharp, shocking pains in her left leg, bilateral feet, and bilateral hands. She denies any episodes of hypoglycemia. She has received flu and pneumonia immunizations this year. She has felt fatigued recently. She does not want to take any medications which might be harmful to her heart.  She drinks a lot of water.     Blood Glucose Monitoring:  The patient didn't bring in their glucometer for this appointment. Reported values above.    Diabetes monitoring and complications:  CAD: Yes  Last eye exam results: 12/12/18 - Mild diabetic retinopathy  Microalbuminuria: 5/22/19 - 31.53 (High)  Neuropathy: Yes  HTN: Yes  On Statin: Yes  On Aspirin: Yes  Depression: Yes  Erectile dysfunction: N/A        Review of Systems:   Pertinent items are noted in HPI.  All other systems are negative.    Active Medications:     Current Outpatient Medications:      acetaminophen (TYLENOL) 325 MG tablet, Take 650 mg by mouth every 4 hours as needed for mild pain Take 2 tablets by mouth every 4 hours as needed for mild " pain, Disp: 100 tablet, Rfl:      albuterol (PROAIR HFA) 108 (90 Base) MCG/ACT inhaler, Inhale 1-2 puffs into the lungs every 4 hours as needed for wheezing, Disp: 8.5 g, Rfl:      aspirin (ASA) 81 MG EC tablet, Take 1 tablet (81 mg) by mouth daily, Disp: , Rfl:      atorvastatin (LIPITOR) 40 MG tablet, TAKE 1 TABLET(40 MG) BY MOUTH DAILY, Disp: 90 tablet, Rfl: 1     blood glucose monitoring (NO BRAND SPECIFIED) meter device kit, Use to test blood sugar four times daily or as directed., Disp: 1 kit, Rfl: 0     bumetanide (BUMEX) 1 MG tablet, Take 2 tablets (2 mg) by mouth 3 times daily Or as directed by [facility], Disp: 360 tablet, Rfl: 3     capsaicin (ZOSTRIX) 0.025 % external cream, Apply 1 g topically 3 times daily For neuropathic pain., Disp: 60 g, Rfl: 1     Continuous Blood Gluc  (FREESTYLE MARTY 14 DAY READER) JEZ, 1 Units 4 times daily (before meals and nightly), Disp: 1 Device, Rfl: 0     Continuous Blood Gluc Sensor (FREESTYLE MARTY 14 DAY SENSOR) MISC, 1 Units 4 times daily (before meals and nightly), Disp: 6 each, Rfl: 3     EPINEPHrine (EPIPEN/ADRENACLICK/OR ANY BX GENERIC EQUIV) 0.3 MG/0.3ML injection 2-pack, Inject 0.3 mLs (0.3 mg) into the muscle once as needed for anaphylaxis, Disp: 0.6 mL, Rfl: 0     escitalopram (LEXAPRO) 20 MG tablet, Take 1 tablet (20 mg) by mouth every morning, Disp: 90 tablet, Rfl: 1     ferrous gluconate (FERGON) 324 (38 Fe) MG tablet, Take 1 tablet (324 mg) by mouth Every Mon, Wed, Fri Morning, Disp: 36 tablet, Rfl: 3     folic acid (FOLVITE) 1 MG tablet, Take 2 tablets (2 mg) by mouth daily, Disp: , Rfl:      guaiFENesin (MUCINEX) 600 MG 12 hr tablet, Take 1 tablet (600 mg) by mouth 2 times daily, Disp: , Rfl:      insulin glargine (LANTUS SOLOSTAR PEN) 100 UNIT/ML pen, Inject   22 units  daily subcutaneously call  if blood sugar <70, often >200., Disp: 15 mL, Rfl: 3     levothyroxine (SYNTHROID) 150 MCG tablet, Take 1 tablet (150 mcg) by mouth daily, Disp: 90 tablet,  "Rfl: 3     LIFESCAN FINEPOINT LANCETS MISC, Use to test blood sugars 2 times daily or as directed., Disp: 100 each, Rfl: prn     losartan (COZAAR) 50 MG tablet, Take 1 tablet (50 mg) by mouth 2 times daily, Disp: 135 tablet, Rfl: 3     metoprolol tartrate (LOPRESSOR) 50 MG tablet, Take 1 tablet (50 mg) by mouth 2 times daily, Disp: 180 tablet, Rfl: 3     niacin ER (NIASPAN) 500 MG CR tablet, TAKE 1 TABLET(500 MG) BY MOUTH TWICE DAILY, Disp: 180 tablet, Rfl: 2     nitroGLYcerin (NITROSTAT) 0.4 MG sublingual tablet, For chest pain place 1 tablet under the tongue every 5 minutes for 3 doses. If symptoms persist 5 minutes after 1st dose call 911., Disp: 25 tablet, Rfl: 0     potassium chloride ER (K-DUR/KLOR-CON M) 10 MEQ CR tablet, Take 2 tablets (20 mEq) by mouth 3 times daily, Disp: 120 tablet, Rfl: 3     pregabalin (LYRICA) 100 MG capsule, Take 1 capsule (100 mg) by mouth 2 times daily, Disp: 180 capsule, Rfl: 3     repaglinide (PRANDIN) 0.5 MG tablet, Take 1 tablet (0.5 mg) by mouth 3 times daily (before meals), Disp: 270 tablet, Rfl: 3     senna-docusate (SENOKOT-S/PERICOLACE) 8.6-50 MG tablet, Take 1-2 tablets by mouth 2 times daily as needed for constipation, Disp: 30 tablet, Rfl: 0     traZODone (DESYREL) 50 MG tablet, TAKE 3 TABLETS(150 MG) BY MOUTH AT BEDTIME, Disp: 270 tablet, Rfl: 1     vitamin D3 (CHOLECALCIFEROL) 67274 units (250 mcg) capsule, Take 1 capsule (10,000 Units) by mouth daily, Disp: , Rfl:      ONE TOUCH ULTRA (DEVICES) MISC, test blood sugar BID (Patient not taking: No sig reported), Disp: 1, Rfl: 0     ONETOUCH ULTRA test strip, USE FOUR TIMES DAILY (Patient not taking: Reported on 10/30/2019), Disp: 400 strip, Rfl: 1     Skin Protectants, Misc. (INTERDRY 10\"X36\") SHEE, Externally apply 1 Box topically daily 1 sheet under breasts prn intertrigo (Patient not taking: Reported on 10/30/2019), Disp: 1 each, Rfl: 3     Skin Protectants, Misc. (INTERDRY AG TEXTILE 10\"X144\") SHEE, Externally apply " 1 Box topically daily Apply to areas of intertrigo prn (Patient not taking: Reported on 10/30/2019), Disp: 1 each, Rfl: 3  No current facility-administered medications for this visit.     Facility-Administered Medications Ordered in Other Visits:      sodium chloride (PF) 0.9% PF flush 10 mL, 10 mL, Intracatheter, Once, Starla Saez NP      Allergies:   Albuterol; Contrast dye; Fish allergy; Iodine; Oxycodone; Tree nuts [nuts]; Bactrim; Betadine [povidone iodine]; Combivent; Dulaglutide; Fish; Lisinopril; Penicillins; Allopurinol; Latex; and Wool fiber      Past Medical History:  Anxiety  COPD  Coronary atherosclerosis  Depression  GERD  Gout  Thyroid cancer  Insomnia  Lymphedema  Migraines  Mononeuritis  Obstructive sleep apnea  Hyperlipidemia  Kidney failure  Type 2 diabetes mellitus  Umbilical hernia  Hypertension  Hypothyroidism  Vitamin D deficiency  Hyperlipidemia  Diastolic CHF  Osteoarthritis  TIA  Spinal stenosis: Lumbar  Carotid artery disease     Past Surgical History:  CABG x3  Coronary stent placement x2  Knee arthroplasty: Bilateral  Cataract removal: Bilateral  Colonoscopy  Heart catheterization x2  Dilation and curettage  Knee arthroscopy: Bilateral  Laparoscopic cholecystectomy  Denture implant  Thyroidectomy  Bilateral thumb surgery: Procedure unlisted  Right breast biopsy  Rotator cuff repair: Left    Family History:   CAD: Mother, father  Diabetes: Mother, sister  Hypertension: Mother, father, sister, brother  Clotting disorder: Mother  DVT: Mother  CVA: Mother  Glaucoma: Mother  Macular degeneration: Mother  Cerebrovascular disease: Father  Alcohol abuse: Father, sister  Liver cancer: Brother  Pancreatic cancer: Brother  Brain cancer: Brother  Heart disease: Sister  Drug abuse: Brother  CAD: Brother x2, sister x2  Kidney disease: Sister     Social History:   Social History     Tobacco Use     Smoking status: Former Smoker     Packs/day: 0.00     Years: 27.00     Pack years: 0.00      "Types: Cigarettes     Last attempt to quit: 1989     Years since quittin.3     Smokeless tobacco: Never Used   Substance Use Topics     Alcohol use: No     Alcohol/week: 0.0 standard drinks     Drug use: No        Physical Exam:   BP 99/47   Pulse 66   Ht 1.676 m (5' 6\")   Wt (!) 150.6 kg (332 lb)   BMI 53.59 kg/m        Wt Readings from Last 10 Encounters:   10/31/19 (!) 150.6 kg (332 lb)   10/30/19 (!) 150.9 kg (332 lb 9.6 oz)   10/15/19 (!) 151 kg (333 lb)   10/01/19 146.5 kg (323 lb)   09/10/19 (!) 151.5 kg (334 lb)   19 149.7 kg (330 lb)   19 145.6 kg (321 lb)   19 147.4 kg (325 lb)   19 145.8 kg (321 lb 8 oz)   19 145 kg (319 lb 10.7 oz)      General: Pleasant,  female in NAD seated in wheelchair.   Psych:  Mood is \"good,\" affect is appropriate.  Thought form and content are fluid and coherent.    HEENT: Eyes and sclera are clear. Extraocular movements are grossly intact without proptosis.  Nares are patent, mucous membranes moist.  Neck: No masses or JVD are noted.    Resp: Easy and unlabored breathing.   Neuro: Alert and oriented, communicating clearly.   Ext: pitting  Edema to 3/4 anterior leg.       Data:  Lab Results   Component Value Date     10/30/2019    POTASSIUM 4.4 10/30/2019    CHLORIDE 104 10/30/2019    CO2 30 10/30/2019    ANIONGAP 5 10/30/2019     (H) 10/30/2019    BUN 37 (H) 10/30/2019    CR 1.84 (H) 10/30/2019    ANTOINETTE 9.7 10/30/2019     Lab Results   Component Value Date    GFRESTIMATED 27 (L) 10/30/2019    GFRESTIMATED 32 (L) 10/01/2019    GFRESTIMATED 25 (L) 09/10/2019    GFRESTBLACK 31 (L) 10/30/2019    GFRESTBLACK 37 (L) 10/01/2019    GFRESTBLACK 29 (L) 09/10/2019      Lab Results   Component Value Date    MICROL 31 2019    UMALCR 31.53 (H) 2019        Lab Results   Component Value Date    A1C 6.5 (H) 2019    A1C 5.2 09/10/2018    A1C 9.3 (H) 2018    A1C 8.2 (H) 2018    A1C 7.3 (H) 2017    " HEMOGLOBINA1 7.2 (A) 10/31/2019    HEMOGLOBINA1 6.5 (A) 04/03/2019    HEMOGLOBINA1 5.5 11/29/2018     No results found for: CPEPT, GADAB, ISCAB    Lab Results   Component Value Date    CHOL 170 03/13/2019    CHOL 203 (H) 03/19/2018    TRIG 118 03/13/2019    TRIG 212 (H) 03/19/2018    HDL 71 03/13/2019    HDL 50 03/19/2018    LDL 76 03/13/2019     (H) 03/19/2018    NHDL 99 03/13/2019    NHDL 153 (H) 03/19/2018     TSH   Date Value Ref Range Status   10/01/2019 5.02 (H) 0.40 - 4.00 mU/L Final       Assessment and Plan:  Type 2 diabetes mellitus with stage 3 chronic kidney disease, with long-term current use of insulin (H)  The patient's blood sugar is at goal per A1c, but in recent month her reported blood sugars are quite high. We will slightly increase Lantus to 30 units daily, and increase by 1 unit until morning blood sugars are 100-180 as long as she has no BG <100. She will continue Prandin with each meal .    - Hemoglobin A1c POCT  - Continuous Blood Gluc  (FREESTYLE MARTY 14 DAY READER) JEZ  Dispense: 1 Device; Refill: 0  - Continuous Blood Gluc Sensor (FREESTYLE MARTY 14 DAY SENSOR) MISC  Dispense: 6 each; Refill: 3  - repaglinide (PRANDIN) 0.5 MG tablet  Dispense: 270 tablet; Refill: 3  - capsaicin (ZOSTRIX) 0.025 % external cream  Dispense: 60 g; Refill: 1    Neuropathy:  I am quite concerned with this. She is on cymbalta as well as Lyrica and trazodone and is still having significant pain, which is quite depressing for her. She may benefit from capsaicin cream, which she is willing to try. She will defer seeing podiatry at this time. This may improve when her blood sugar is brought back into range, but may worsen before it improves and she expressed understanding.    Depression:   Ongoing. She will continue antidepressant and discuss with PCP.  Her last TSH was elevated though with normal T4 and dose continued.  We will recheck TSH at RTC and increase if persistent elevation.         Follow-up: Return in about 4 weeks (around 11/28/2019).     >50% of 40 minute visit spent in face to face counseling, education and coordination of care related to options for better glycemic control as well as preventing, detecting, and treating hypoglycemia.      It is my privilege to be involved in the care of the above patient.     Muriel Will PA-C, [facility]  [facility]  Diabetes, Endocrinology, and Metabolism  [phone] Appointments/Nurse  [phone] Fax  [phone] pager  [phone] nurse line    Scribe Disclosure:  I, Barbra Dsouza, am serving as a scribe to document services personally performed by Muriel Will PA-C at this visit, based upon the provider's statements to me. All documentation has been reviewed by the aforementioned provider prior to being entered into the official medical record.     Portions of this medical record were completed by a scribe. UPON MY REVIEW AND AUTHENTICATION BY ELECTRONIC SIGNATURE, this confirms (a) I performed the applicable clinical services, and (b) the record is accurate.

## 2019-10-31 NOTE — PROGRESS NOTES
"[facility]  Endocrinology  Muriel Will PA-C  10/31/2019      Chief Complaint:   Diabetes    History of Present Illness:   Mariel Ribeiro is a 74 year old female with a history of type 2 diabetes mellitus, kidney failure, TIA, CHF, CAD status post CABG, COPD, thyroid cancer status post thyroidectomy and resultant hypothyroidism, and hypertension who presents for follow up of diabetes. She was diagnosed with type 2 diabetes mellitus in 2007. Initially managed with oral Metformin, Januvia, and Glimepiride, all have been discontinued due to declining renal fucntion. She was well managed with NPH 28 units bid and Prandin with meals.    Mariel presented to the ED 4/25/2019 with weight gain and shortness of breath. In the past Mariel has been admitted for acute on chronic heart failure with preserved ejection fraction. She was noted to have an ejection fraction of 23-40%, down from baseline of 40-45%. Mariel was maintained on Bumex this admission and received 1 additional dose of metolazone 05/01/2019.  She was diuresed a total of 13L and 8kg.  She was discharged to home on 2mg oral Bumex once daily, daily weights and follow-up in [facility] clinic.     NPH and Prandin were held on admission. Inpatient diabetes care used a sliding scale which kept her within normal limits ( with most 100-160) On dishcarge she was instructed to hold NPH , and continue 0.5 mg Prandin with meals. Creatinin was elevated above baseline of 1.4-1.5, 1.86 at time of discharge. GFR was also declined to 28-32 during hospitalization down from 39 - 42 earlier this year.  4/30/19 A1c 6.5%, hemoglobin at the time was slightly low at 11.2. Since discharge she reported  feeling \"barely fair\", had not been on any insulin since discharge, but continued on 0.5mg Prandin before meals.     During her previous visit on 5/7/19, her A1c was 6.5. She was told to start 20 units of long acting insulin once daily if this was covered by insurance. GLP-1 was " considered for meal coverage due to concerns of poor memory with short acting insulin use. If long acting insulin is not covered, plan was to resume NPH 10 units twice daily though this has led to a lot of eating to maintain BG and weight loss is desired.      Her A1c today is 7.2. She has no glucometer but reports has had episodes of hyperglycemia in the 200-300 at morning and at night. She is checking BG 2-3 times/day and the lowest value she's seen recently is 170. She is currently taking Prandin with meals and is taking 26 units of Lantus once a day, but increased her does to 28-30 while her blood sugars were in the 300s. She has intermittent sharp, shocking pains in her left leg, bilateral feet, and bilateral hands. She denies any episodes of hypoglycemia. She has received flu and pneumonia immunizations this year. She has felt fatigued recently. She does not want to take any medications which might be harmful to her heart.  She drinks a lot of water.     Blood Glucose Monitoring:  The patient didn't bring in their glucometer for this appointment. Reported values above.    Diabetes monitoring and complications:  CAD: Yes  Last eye exam results: 12/12/18 - Mild diabetic retinopathy  Microalbuminuria: 5/22/19 - 31.53 (High)  Neuropathy: Yes  HTN: Yes  On Statin: Yes  On Aspirin: Yes  Depression: Yes  Erectile dysfunction: N/A        Review of Systems:   Pertinent items are noted in HPI.  All other systems are negative.    Active Medications:     Current Outpatient Medications:      acetaminophen (TYLENOL) 325 MG tablet, Take 650 mg by mouth every 4 hours as needed for mild pain Take 2 tablets by mouth every 4 hours as needed for mild pain, Disp: 100 tablet, Rfl:      albuterol (PROAIR HFA) 108 (90 Base) MCG/ACT inhaler, Inhale 1-2 puffs into the lungs every 4 hours as needed for wheezing, Disp: 8.5 g, Rfl:      aspirin (ASA) 81 MG EC tablet, Take 1 tablet (81 mg) by mouth daily, Disp: , Rfl:      atorvastatin  (LIPITOR) 40 MG tablet, TAKE 1 TABLET(40 MG) BY MOUTH DAILY, Disp: 90 tablet, Rfl: 1     blood glucose monitoring (NO BRAND SPECIFIED) meter device kit, Use to test blood sugar four times daily or as directed., Disp: 1 kit, Rfl: 0     bumetanide (BUMEX) 1 MG tablet, Take 2 tablets (2 mg) by mouth 3 times daily Or as directed by [facility], Disp: 360 tablet, Rfl: 3     capsaicin (ZOSTRIX) 0.025 % external cream, Apply 1 g topically 3 times daily For neuropathic pain., Disp: 60 g, Rfl: 1     Continuous Blood Gluc  (FREESTYLE MARTY 14 DAY READER) JEZ, 1 Units 4 times daily (before meals and nightly), Disp: 1 Device, Rfl: 0     Continuous Blood Gluc Sensor (FREESTYLE MARTY 14 DAY SENSOR) MISC, 1 Units 4 times daily (before meals and nightly), Disp: 6 each, Rfl: 3     EPINEPHrine (EPIPEN/ADRENACLICK/OR ANY BX GENERIC EQUIV) 0.3 MG/0.3ML injection 2-pack, Inject 0.3 mLs (0.3 mg) into the muscle once as needed for anaphylaxis, Disp: 0.6 mL, Rfl: 0     escitalopram (LEXAPRO) 20 MG tablet, Take 1 tablet (20 mg) by mouth every morning, Disp: 90 tablet, Rfl: 1     ferrous gluconate (FERGON) 324 (38 Fe) MG tablet, Take 1 tablet (324 mg) by mouth Every Mon, Wed, Fri Morning, Disp: 36 tablet, Rfl: 3     folic acid (FOLVITE) 1 MG tablet, Take 2 tablets (2 mg) by mouth daily, Disp: , Rfl:      guaiFENesin (MUCINEX) 600 MG 12 hr tablet, Take 1 tablet (600 mg) by mouth 2 times daily, Disp: , Rfl:      insulin glargine (LANTUS SOLOSTAR PEN) 100 UNIT/ML pen, Inject   22 units  daily subcutaneously call  if blood sugar <70, often >200., Disp: 15 mL, Rfl: 3     levothyroxine (SYNTHROID) 150 MCG tablet, Take 1 tablet (150 mcg) by mouth daily, Disp: 90 tablet, Rfl: 3     Fits.meCAN FINEPOINT LANCETS MISC, Use to test blood sugars 2 times daily or as directed., Disp: 100 each, Rfl: prn     losartan (COZAAR) 50 MG tablet, Take 1 tablet (50 mg) by mouth 2 times daily, Disp: 135 tablet, Rfl: 3     metoprolol tartrate (LOPRESSOR) 50 MG tablet,  "Take 1 tablet (50 mg) by mouth 2 times daily, Disp: 180 tablet, Rfl: 3     niacin ER (NIASPAN) 500 MG CR tablet, TAKE 1 TABLET(500 MG) BY MOUTH TWICE DAILY, Disp: 180 tablet, Rfl: 2     nitroGLYcerin (NITROSTAT) 0.4 MG sublingual tablet, For chest pain place 1 tablet under the tongue every 5 minutes for 3 doses. If symptoms persist 5 minutes after 1st dose call 911., Disp: 25 tablet, Rfl: 0     potassium chloride ER (K-DUR/KLOR-CON M) 10 MEQ CR tablet, Take 2 tablets (20 mEq) by mouth 3 times daily, Disp: 120 tablet, Rfl: 3     pregabalin (LYRICA) 100 MG capsule, Take 1 capsule (100 mg) by mouth 2 times daily, Disp: 180 capsule, Rfl: 3     repaglinide (PRANDIN) 0.5 MG tablet, Take 1 tablet (0.5 mg) by mouth 3 times daily (before meals), Disp: 270 tablet, Rfl: 3     senna-docusate (SENOKOT-S/PERICOLACE) 8.6-50 MG tablet, Take 1-2 tablets by mouth 2 times daily as needed for constipation, Disp: 30 tablet, Rfl: 0     traZODone (DESYREL) 50 MG tablet, TAKE 3 TABLETS(150 MG) BY MOUTH AT BEDTIME, Disp: 270 tablet, Rfl: 1     vitamin D3 (CHOLECALCIFEROL) 19107 units (250 mcg) capsule, Take 1 capsule (10,000 Units) by mouth daily, Disp: , Rfl:      ONE TOUCH ULTRA (DEVICES) MISC, test blood sugar BID (Patient not taking: No sig reported), Disp: 1, Rfl: 0     ONETOUCH ULTRA test strip, USE FOUR TIMES DAILY (Patient not taking: Reported on 10/30/2019), Disp: 400 strip, Rfl: 1     Skin Protectants, Misc. (INTERDRY 10\"X36\") SHEE, Externally apply 1 Box topically daily 1 sheet under breasts prn intertrigo (Patient not taking: Reported on 10/30/2019), Disp: 1 each, Rfl: 3     Skin Protectants, Misc. (INTERDRY AG TEXTILE 10\"X144\") SHEE, Externally apply 1 Box topically daily Apply to areas of intertrigo prn (Patient not taking: Reported on 10/30/2019), Disp: 1 each, Rfl: 3  No current facility-administered medications for this visit.     Facility-Administered Medications Ordered in Other Visits:      sodium chloride (PF) 0.9% PF " "flush 10 mL, 10 mL, Intracatheter, Once, Starla Saez NP      Allergies:   Albuterol; Contrast dye; Fish allergy; Iodine; Oxycodone; Tree nuts [nuts]; Bactrim; Betadine [povidone iodine]; Combivent; Dulaglutide; Fish; Lisinopril; Penicillins; Allopurinol; Latex; and Wool fiber      Past Medical History:  Anxiety  COPD  Coronary atherosclerosis  Depression  GERD  Gout  Thyroid cancer  Insomnia  Lymphedema  Migraines  Mononeuritis  Obstructive sleep apnea  Hyperlipidemia  Kidney failure  Type 2 diabetes mellitus  Umbilical hernia  Hypertension  Hypothyroidism  Vitamin D deficiency  Hyperlipidemia  Diastolic CHF  Osteoarthritis  TIA  Spinal stenosis: Lumbar  Carotid artery disease     Past Surgical History:  CABG x3  Coronary stent placement x2  Knee arthroplasty: Bilateral  Cataract removal: Bilateral  Colonoscopy  Heart catheterization x2  Dilation and curettage  Knee arthroscopy: Bilateral  Laparoscopic cholecystectomy  Denture implant  Thyroidectomy  Bilateral thumb surgery: Procedure unlisted  Right breast biopsy  Rotator cuff repair: Left    Family History:   CAD: Mother, father  Diabetes: Mother, sister  Hypertension: Mother, father, sister, brother  Clotting disorder: Mother  DVT: Mother  CVA: Mother  Glaucoma: Mother  Macular degeneration: Mother  Cerebrovascular disease: Father  Alcohol abuse: Father, sister  Liver cancer: Brother  Pancreatic cancer: Brother  Brain cancer: Brother  Heart disease: Sister  Drug abuse: Brother  CAD: Brother x2, sister x2  Kidney disease: Sister     Social History:   Social History     Tobacco Use     Smoking status: Former Smoker     Packs/day: 0.00     Years: 27.00     Pack years: 0.00     Types: Cigarettes     Last attempt to quit: 1989     Years since quittin.3     Smokeless tobacco: Never Used   Substance Use Topics     Alcohol use: No     Alcohol/week: 0.0 standard drinks     Drug use: No        Physical Exam:   BP 99/47   Pulse 66   Ht 1.676 m (5' 6\")   " "Wt (!) 150.6 kg (332 lb)   BMI 53.59 kg/m       Wt Readings from Last 10 Encounters:   10/31/19 (!) 150.6 kg (332 lb)   10/30/19 (!) 150.9 kg (332 lb 9.6 oz)   10/15/19 (!) 151 kg (333 lb)   10/01/19 146.5 kg (323 lb)   09/10/19 (!) 151.5 kg (334 lb)   09/05/19 149.7 kg (330 lb)   07/31/19 145.6 kg (321 lb)   07/01/19 147.4 kg (325 lb)   06/26/19 145.8 kg (321 lb 8 oz)   06/13/19 145 kg (319 lb 10.7 oz)      General: Pleasant,  female in NAD seated in wheelchair.   Psych:  Mood is \"good,\" affect is appropriate.  Thought form and content are fluid and coherent.    HEENT: Eyes and sclera are clear. Extraocular movements are grossly intact without proptosis.  Nares are patent, mucous membranes moist.  Neck: No masses or JVD are noted.    Resp: Easy and unlabored breathing.   Neuro: Alert and oriented, communicating clearly.   Ext: pitting  Edema to 3/4 anterior leg.       Data:  Lab Results   Component Value Date     10/30/2019    POTASSIUM 4.4 10/30/2019    CHLORIDE 104 10/30/2019    CO2 30 10/30/2019    ANIONGAP 5 10/30/2019     (H) 10/30/2019    BUN 37 (H) 10/30/2019    CR 1.84 (H) 10/30/2019    ANTOINETTE 9.7 10/30/2019     Lab Results   Component Value Date    GFRESTIMATED 27 (L) 10/30/2019    GFRESTIMATED 32 (L) 10/01/2019    GFRESTIMATED 25 (L) 09/10/2019    GFRESTBLACK 31 (L) 10/30/2019    GFRESTBLACK 37 (L) 10/01/2019    GFRESTBLACK 29 (L) 09/10/2019      Lab Results   Component Value Date    MICROL 31 05/22/2019    UMALCR 31.53 (H) 05/22/2019        Lab Results   Component Value Date    A1C 6.5 (H) 04/30/2019    A1C 5.2 09/10/2018    A1C 9.3 (H) 07/01/2018    A1C 8.2 (H) 03/19/2018    A1C 7.3 (H) 08/09/2017    HEMOGLOBINA1 7.2 (A) 10/31/2019    HEMOGLOBINA1 6.5 (A) 04/03/2019    HEMOGLOBINA1 5.5 11/29/2018     No results found for: CPEPT, GADAB, ISCAB    Lab Results   Component Value Date    CHOL 170 03/13/2019    CHOL 203 (H) 03/19/2018    TRIG 118 03/13/2019    TRIG 212 (H) 03/19/2018    HDL 71 " 03/13/2019    HDL 50 03/19/2018    LDL 76 03/13/2019     (H) 03/19/2018    NHDL 99 03/13/2019    NHDL 153 (H) 03/19/2018     TSH   Date Value Ref Range Status   10/01/2019 5.02 (H) 0.40 - 4.00 mU/L Final       Assessment and Plan:  Type 2 diabetes mellitus with stage 3 chronic kidney disease, with long-term current use of insulin (H)  The patient's blood sugar is at goal per A1c, but in recent month her reported blood sugars are quite high. We will slightly increase Lantus to 30 units daily, and increase by 1 unit until morning blood sugars are 100-180 as long as she has no BG <100. She will continue Prandin with each meal .    - Hemoglobin A1c POCT  - Continuous Blood Gluc  (FREESTYLE MARTY 14 DAY READER) JEZ  Dispense: 1 Device; Refill: 0  - Continuous Blood Gluc Sensor (FREESTYLE MARTY 14 DAY SENSOR) MISC  Dispense: 6 each; Refill: 3  - repaglinide (PRANDIN) 0.5 MG tablet  Dispense: 270 tablet; Refill: 3  - capsaicin (ZOSTRIX) 0.025 % external cream  Dispense: 60 g; Refill: 1    Neuropathy:  I am quite concerned with this. She is on cymbalta as well as Lyrica and trazodone and is still having significant pain, which is quite depressing for her. She may benefit from capsaicin cream, which she is willing to try. She will defer seeing podiatry at this time. This may improve when her blood sugar is brought back into range, but may worsen before it improves and she expressed understanding.    Depression:   Ongoing. She will continue antidepressant and discuss with PCP.  Her last TSH was elevated though with normal T4 and dose continued.  We will recheck TSH at RTC and increase if persistent elevation.        Follow-up: Return in about 4 weeks (around 11/28/2019).     >50% of 40 minute visit spent in face to face counseling, education and coordination of care related to options for better glycemic control as well as preventing, detecting, and treating hypoglycemia.      It is my privilege to be involved  in the care of the above patient.     Muriel Will PA-C, [facility]  [facility]  Diabetes, Endocrinology, and Metabolism  [phone] Appointments/Nurse  [phone] Fax  [phone] pager  [phone] nurse line               Scribe Disclosure:  I, Barbra Dsouza, am serving as a scribe to document services personally performed by Muriel Will PA-C at this visit, based upon the provider's statements to me. All documentation has been reviewed by the aforementioned provider prior to being entered into the official medical record.     Portions of this medical record were completed by a scribe. UPON MY REVIEW AND AUTHENTICATION BY ELECTRONIC SIGNATURE, this confirms (a) I performed the applicable clinical services, and (b) the record is accurate.

## 2019-11-01 ENCOUNTER — PATIENT OUTREACH (OUTPATIENT)
Dept: CARDIOLOGY | Facility: CLINIC | Age: 74
End: 2019-11-01

## 2019-11-01 NOTE — PROGRESS NOTES
"[facility] - CardioMEMS Reading Review      CardioMEMS reviewed and routed to patient's provider, Starla Saez NP.   Current diuretic: Bumex 2mg TID  Recent plan of care changes:  Saw Dr. Wolf this Wednesday, losartan increased    Current Threshold parameters:       Today's Waveform:       Readings:           Serena Streeter RN BSN CHFN  Cardiology Care Coordinator - [facility]  Questions and [phone]  First press #1 for the [place] and then press #4 for \"Your Care Team\" to reach us Cardiology Nurses.                   "

## 2019-11-05 ENCOUNTER — VIRTUAL VISIT (OUTPATIENT)
Dept: FAMILY MEDICINE | Facility: CLINIC | Age: 74
End: 2019-11-05
Payer: MEDICARE

## 2019-11-05 DIAGNOSIS — F41.1 GAD (GENERALIZED ANXIETY DISORDER): ICD-10-CM

## 2019-11-05 DIAGNOSIS — F32.0 MILD MAJOR DEPRESSION (H): ICD-10-CM

## 2019-11-05 DIAGNOSIS — G89.29 OTHER CHRONIC PAIN: ICD-10-CM

## 2019-11-05 DIAGNOSIS — F41.1 GAD (GENERALIZED ANXIETY DISORDER): Primary | ICD-10-CM

## 2019-11-05 PROCEDURE — 99442 ZZC PHYSICIAN TELEPHONE EVALUATION 11-20 MIN: CPT | Performed by: FAMILY MEDICINE

## 2019-11-05 RX ORDER — TRAMADOL HYDROCHLORIDE 50 MG/1
50 TABLET ORAL EVERY 6 HOURS PRN
Qty: 30 TABLET | Refills: 0 | Status: SHIPPED | OUTPATIENT
Start: 2019-11-05 | End: 2020-09-17

## 2019-11-05 RX ORDER — BUPROPION HYDROCHLORIDE 150 MG/1
150 TABLET ORAL EVERY MORNING
Qty: 30 TABLET | Refills: 3 | Status: SHIPPED | OUTPATIENT
Start: 2019-11-05 | End: 2022-05-31

## 2019-11-05 ASSESSMENT — ANXIETY QUESTIONNAIRES
GAD7 TOTAL SCORE: 17
2. NOT BEING ABLE TO STOP OR CONTROL WORRYING: NEARLY EVERY DAY
IF YOU CHECKED OFF ANY PROBLEMS ON THIS QUESTIONNAIRE, HOW DIFFICULT HAVE THESE PROBLEMS MADE IT FOR YOU TO DO YOUR WORK, TAKE CARE OF THINGS AT HOME, OR GET ALONG WITH OTHER PEOPLE: EXTREMELY DIFFICULT
5. BEING SO RESTLESS THAT IT IS HARD TO SIT STILL: NEARLY EVERY DAY
7. FEELING AFRAID AS IF SOMETHING AWFUL MIGHT HAPPEN: NEARLY EVERY DAY
6. BECOMING EASILY ANNOYED OR IRRITABLE: MORE THAN HALF THE DAYS
3. WORRYING TOO MUCH ABOUT DIFFERENT THINGS: NOT AT ALL
1. FEELING NERVOUS, ANXIOUS, OR ON EDGE: NEARLY EVERY DAY

## 2019-11-05 ASSESSMENT — PATIENT HEALTH QUESTIONNAIRE - PHQ9
SUM OF ALL RESPONSES TO PHQ QUESTIONS 1-9: 21
5. POOR APPETITE OR OVEREATING: NEARLY EVERY DAY

## 2019-11-05 NOTE — PROGRESS NOTES
"Mariel Ribeiro is a 74 year old female who is being evaluated via a telephone visit.      The patient has been notified of following (by LOREN RETANA CMA      If during the course of the call the physician/provider feels a telephone visit is not appropriate, you will not be charged for this service\"    Consent has been obtained for this service by care team member: yes.  See the scanned image in the medical record.      S: The history as provided by the patient to the provider during this 3 way call include   Follow-up ED visit for depression- was with a friend and stated she wondered if she was better off dead.  Friend wanted her to go to ED for wellness check.  Patient was not actively suicidal-- feeling blue with call-back for abnormal mammogram and in pain.  Currently on Lexapro 20 mg once daily.  Would be willing to add Wellbutrin for better management of mood.  She would like something to help with breakthrough pain-- tolerated Tramadol well in ED- feels if pain gets out of control- mood follows.  Has breast bx scheduled for tomorrow.    Pertinent parts of the the patient's medical history reviewed and confirmed by the provider included :      Total time of call between patient and provider was 15 minutes     Chantal Connell MD  ===================================================    I have reviewed the note as documented above.  This accurately captures the substance of my conversation with the patient,    Additional provider notes:    Assessment/Plan:  XU/Major depression  Add Wellbutrin  mg once daily.  Follow-up 4 weeks for further titration prn.    Chronic pain  Tramdol #30 for breakthrough pain prn.              "

## 2019-11-06 ENCOUNTER — ANCILLARY PROCEDURE (OUTPATIENT)
Dept: MAMMOGRAPHY | Facility: CLINIC | Age: 74
End: 2019-11-06
Attending: FAMILY MEDICINE
Payer: MEDICARE

## 2019-11-06 DIAGNOSIS — R92.1 BREAST CALCIFICATIONS: Primary | ICD-10-CM

## 2019-11-06 DIAGNOSIS — I50.32 CHRONIC DIASTOLIC HEART FAILURE (H): Chronic | ICD-10-CM

## 2019-11-06 DIAGNOSIS — N63.20 LEFT BREAST MASS: ICD-10-CM

## 2019-11-06 LAB
ANION GAP SERPL CALCULATED.3IONS-SCNC: 4 MMOL/L (ref 3–14)
BUN SERPL-MCNC: 35 MG/DL (ref 7–30)
CALCIUM SERPL-MCNC: 9.6 MG/DL (ref 8.5–10.1)
CHLORIDE SERPL-SCNC: 105 MMOL/L (ref 94–109)
CO2 SERPL-SCNC: 31 MMOL/L (ref 20–32)
CREAT SERPL-MCNC: 1.69 MG/DL (ref 0.52–1.04)
GFR SERPL CREATININE-BSD FRML MDRD: 30 ML/MIN/{1.73_M2}
GLUCOSE SERPL-MCNC: 167 MG/DL (ref 70–99)
POTASSIUM SERPL-SCNC: 4 MMOL/L (ref 3.4–5.3)
SODIUM SERPL-SCNC: 140 MMOL/L (ref 133–144)

## 2019-11-06 PROCEDURE — 88360 TUMOR IMMUNOHISTOCHEM/MANUAL: CPT | Performed by: FAMILY MEDICINE

## 2019-11-06 PROCEDURE — 88342 IMHCHEM/IMCYTCHM 1ST ANTB: CPT | Performed by: FAMILY MEDICINE

## 2019-11-06 PROCEDURE — 88305 TISSUE EXAM BY PATHOLOGIST: CPT | Performed by: FAMILY MEDICINE

## 2019-11-06 RX ORDER — LIDOCAINE HYDROCHLORIDE 10 MG/ML
10 INJECTION, SOLUTION EPIDURAL; INFILTRATION; INTRACAUDAL; PERINEURAL ONCE
Status: COMPLETED | OUTPATIENT
Start: 2019-11-06 | End: 2019-11-06

## 2019-11-06 RX ORDER — LIDOCAINE HYDROCHLORIDE AND EPINEPHRINE 10; 10 MG/ML; UG/ML
10 INJECTION, SOLUTION INFILTRATION; PERINEURAL ONCE
Status: COMPLETED | OUTPATIENT
Start: 2019-11-06 | End: 2019-11-06

## 2019-11-06 RX ADMIN — LIDOCAINE HYDROCHLORIDE 10 ML: 10 INJECTION, SOLUTION EPIDURAL; INFILTRATION; INTRACAUDAL; PERINEURAL at 13:34

## 2019-11-06 RX ADMIN — LIDOCAINE HYDROCHLORIDE AND EPINEPHRINE 10 ML: 10; 10 INJECTION, SOLUTION INFILTRATION; PERINEURAL at 13:34

## 2019-11-06 ASSESSMENT — ANXIETY QUESTIONNAIRES: GAD7 TOTAL SCORE: 17

## 2019-11-07 RX ORDER — BUPROPION HYDROCHLORIDE 150 MG/1
TABLET ORAL
Qty: 90 TABLET | Refills: 3 | OUTPATIENT
Start: 2019-11-07

## 2019-11-07 NOTE — TELEPHONE ENCOUNTER
"Requested Prescriptions   Pending Prescriptions Disp Refills     buPROPion (WELLBUTRIN XL) 150 MG 24 hr tablet [Pharmacy Med Name: BUPROPION XL 150MG TABLETS (24 H)] 90 tablet 3     Sig: TAKE 1 TABLET(150 MG) BY MOUTH EVERY MORNING      Last Written Prescription Date:  11/5/2019  Last Fill Quantity: 30 tablet    ,  # refills: 3   Last Office Visit: 11/5/2019   Future Office Visit:            SSRIs Protocol Failed - 11/5/2019  3:52 PM        Failed - PHQ-9 score less than 5 in past 6 months     Please review last PHQ-9 score.           Passed - Medication is Bupropion     If the medication is Bupropion (Wellbutrin), and the patient is taking for smoking cessation; OK to refill.          Passed - Medication is active on med list        Passed - Patient is age 18 or older        Passed - No active pregnancy on record        Passed - No positive pregnancy test in last 12 months        Passed - Recent (6 mo) or future (30 days) visit within the authorizing provider's specialty     Patient had office visit in the last 6 months or has a visit in the next 30 days with authorizing provider or within the authorizing provider's specialty.  See \"Patient Info\" tab in inbasket, or \"Choose Columns\" in Meds & Orders section of the refill encounter.            PHQ-9 SCORE 2/15/2019 9/5/2019 11/5/2019   PHQ-9 Total Score - - -   PHQ-9 Total Score MyChart - - -   PHQ-9 Total Score 15 16 21     XU-7 SCORE 8/23/2018 9/5/2019 11/5/2019   Total Score - - -   Total Score 0 11 17           "

## 2019-11-07 NOTE — TELEPHONE ENCOUNTER
"Message sent to pharmacy - Refusal reason: OTHER (SEE ORDER SENT 11/5/19 ORDERED AS PROVIDER WANTED.).   Plan in e-visit 11/5/19 - \"Add Wellbutrin  mg once daily.  Follow-up 4 weeks for further titration prn. \"        Loraine VERA                                                                                                                                                                                                                                                                                                                                                                                       "

## 2019-11-08 ENCOUNTER — TELEPHONE (OUTPATIENT)
Dept: BEHAVIORAL HEALTH | Facility: CLINIC | Age: 74
End: 2019-11-08

## 2019-11-08 NOTE — TELEPHONE ENCOUNTER
"I spoke with Mariel about Magdalena Hall's referral to see Dr. Chaidez for a psychiatric medication consultation. I explained Dr. Chaidez's integrated care model, and she stated she would \"Pass for now.\"  Mariel seemed interested in a long term provider. I offered Dr. Chaidez's services until she could get into the psychiatry clinic. She stated she would just have her PCP continue to fill her medications.   "

## 2019-11-11 ENCOUNTER — HOSPITAL ENCOUNTER (INPATIENT)
Facility: CLINIC | Age: 74
LOS: 1 days | Discharge: HOME | DRG: 204 | End: 2019-11-12
Attending: EMERGENCY MEDICINE | Admitting: INTERNAL MEDICINE
Payer: MEDICARE

## 2019-11-11 ENCOUNTER — NURSE TRIAGE (OUTPATIENT)
Dept: FAMILY MEDICINE | Facility: CLINIC | Age: 74
End: 2019-11-11

## 2019-11-11 ENCOUNTER — APPOINTMENT (OUTPATIENT)
Dept: GENERAL RADIOLOGY | Facility: CLINIC | Age: 74
DRG: 204 | End: 2019-11-11
Attending: EMERGENCY MEDICINE
Payer: MEDICARE

## 2019-11-11 ENCOUNTER — TELEPHONE (OUTPATIENT)
Dept: MAMMOGRAPHY | Facility: CLINIC | Age: 74
End: 2019-11-11

## 2019-11-11 ENCOUNTER — ALLIED HEALTH/NURSE VISIT (OUTPATIENT)
Dept: CARDIOLOGY | Facility: CLINIC | Age: 74
End: 2019-11-11
Attending: NURSE PRACTITIONER
Payer: MEDICARE

## 2019-11-11 ENCOUNTER — APPOINTMENT (OUTPATIENT)
Dept: NUCLEAR MEDICINE | Facility: CLINIC | Age: 74
DRG: 204 | End: 2019-11-11
Attending: EMERGENCY MEDICINE
Payer: MEDICARE

## 2019-11-11 ENCOUNTER — PATIENT OUTREACH (OUTPATIENT)
Dept: CARDIOLOGY | Facility: CLINIC | Age: 74
End: 2019-11-11

## 2019-11-11 DIAGNOSIS — L30.4 INTERTRIGO: Primary | ICD-10-CM

## 2019-11-11 DIAGNOSIS — I50.32 CHRONIC DIASTOLIC HEART FAILURE (H): Primary | Chronic | ICD-10-CM

## 2019-11-11 DIAGNOSIS — R04.2 HEMOPTYSIS: ICD-10-CM

## 2019-11-11 LAB
ANION GAP SERPL CALCULATED.3IONS-SCNC: 8 MMOL/L (ref 3–14)
APTT PPP: 26 SEC (ref 22–37)
BASOPHILS # BLD AUTO: 0 10E9/L (ref 0–0.2)
BASOPHILS NFR BLD AUTO: 0.5 %
BUN SERPL-MCNC: 40 MG/DL (ref 7–30)
CALCIUM SERPL-MCNC: 9.8 MG/DL (ref 8.5–10.1)
CHLORIDE SERPL-SCNC: 103 MMOL/L (ref 94–109)
CO2 SERPL-SCNC: 30 MMOL/L (ref 20–32)
COPATH REPORT: NORMAL
CREAT SERPL-MCNC: 1.49 MG/DL (ref 0.52–1.04)
DIFFERENTIAL METHOD BLD: ABNORMAL
EOSINOPHIL # BLD AUTO: 0.2 10E9/L (ref 0–0.7)
EOSINOPHIL NFR BLD AUTO: 3.5 %
ERYTHROCYTE [DISTWIDTH] IN BLOOD BY AUTOMATED COUNT: 12.8 % (ref 10–15)
GFR SERPL CREATININE-BSD FRML MDRD: 34 ML/MIN/{1.73_M2}
GLUCOSE BLDC GLUCOMTR-MCNC: 130 MG/DL (ref 70–99)
GLUCOSE SERPL-MCNC: 141 MG/DL (ref 70–99)
HCT VFR BLD AUTO: 39.4 % (ref 35–47)
HGB BLD-MCNC: 12.8 G/DL (ref 11.7–15.7)
IMM GRANULOCYTES # BLD: 0 10E9/L (ref 0–0.4)
IMM GRANULOCYTES NFR BLD: 0.3 %
INR PPP: 1.04 (ref 0.86–1.14)
LYMPHOCYTES # BLD AUTO: 1.7 10E9/L (ref 0.8–5.3)
LYMPHOCYTES NFR BLD AUTO: 24.9 %
MCH RBC QN AUTO: 29.3 PG (ref 26.5–33)
MCHC RBC AUTO-ENTMCNC: 32.5 G/DL (ref 31.5–36.5)
MCV RBC AUTO: 90 FL (ref 78–100)
MONOCYTES # BLD AUTO: 0.5 10E9/L (ref 0–1.3)
MONOCYTES NFR BLD AUTO: 7.8 %
NEUTROPHILS # BLD AUTO: 4.2 10E9/L (ref 1.6–8.3)
NEUTROPHILS NFR BLD AUTO: 63 %
NRBC # BLD AUTO: 0 10*3/UL
NRBC BLD AUTO-RTO: 0 /100
NT-PROBNP SERPL-MCNC: 422 PG/ML (ref 0–900)
PLATELET # BLD AUTO: 105 10E9/L (ref 150–450)
POTASSIUM SERPL-SCNC: 3.8 MMOL/L (ref 3.4–5.3)
RBC # BLD AUTO: 4.37 10E12/L (ref 3.8–5.2)
SODIUM SERPL-SCNC: 141 MMOL/L (ref 133–144)
TROPONIN I SERPL-MCNC: <0.015 UG/L (ref 0–0.04)
WBC # BLD AUTO: 6.7 10E9/L (ref 4–11)

## 2019-11-11 PROCEDURE — 84484 ASSAY OF TROPONIN QUANT: CPT | Performed by: EMERGENCY MEDICINE

## 2019-11-11 PROCEDURE — 00000146 ZZHCL STATISTIC GLUCOSE BY METER IP

## 2019-11-11 PROCEDURE — 27210210 NM LUNG SCAN VENTILATION AND PERFUSION

## 2019-11-11 PROCEDURE — 99285 EMERGENCY DEPT VISIT HI MDM: CPT | Mod: 25 | Performed by: EMERGENCY MEDICINE

## 2019-11-11 PROCEDURE — A9567 TECHNETIUM TC-99M AEROSOL: HCPCS | Performed by: EMERGENCY MEDICINE

## 2019-11-11 PROCEDURE — 83880 ASSAY OF NATRIURETIC PEPTIDE: CPT | Performed by: EMERGENCY MEDICINE

## 2019-11-11 PROCEDURE — 80048 BASIC METABOLIC PNL TOTAL CA: CPT | Performed by: EMERGENCY MEDICINE

## 2019-11-11 PROCEDURE — 34300033 ZZH RX 343: Performed by: EMERGENCY MEDICINE

## 2019-11-11 PROCEDURE — 93005 ELECTROCARDIOGRAM TRACING: CPT | Performed by: EMERGENCY MEDICINE

## 2019-11-11 PROCEDURE — 85025 COMPLETE CBC W/AUTO DIFF WBC: CPT | Performed by: EMERGENCY MEDICINE

## 2019-11-11 PROCEDURE — 93264 REM MNTR WRLS P-ART PRS SNR: CPT | Mod: ZP | Performed by: PHYSICIAN ASSISTANT

## 2019-11-11 PROCEDURE — 85610 PROTHROMBIN TIME: CPT | Performed by: EMERGENCY MEDICINE

## 2019-11-11 PROCEDURE — 71046 X-RAY EXAM CHEST 2 VIEWS: CPT

## 2019-11-11 PROCEDURE — 99223 1ST HOSP IP/OBS HIGH 75: CPT | Mod: AI | Performed by: INTERNAL MEDICINE

## 2019-11-11 PROCEDURE — 93010 ELECTROCARDIOGRAM REPORT: CPT | Mod: Z6 | Performed by: EMERGENCY MEDICINE

## 2019-11-11 PROCEDURE — A9540 TC99M MAA: HCPCS | Performed by: EMERGENCY MEDICINE

## 2019-11-11 PROCEDURE — 85730 THROMBOPLASTIN TIME PARTIAL: CPT | Performed by: EMERGENCY MEDICINE

## 2019-11-11 RX ADMIN — KIT FOR THE PREPARATION OF TECHNETIUM TC 99M PENTETATE 2 MILLICURIE: 20 INJECTION, POWDER, LYOPHILIZED, FOR SOLUTION INTRAVENOUS; RESPIRATORY (INHALATION) at 23:24

## 2019-11-11 RX ADMIN — KIT FOR THE PREPARATION OF TECHNETIUM TC 99M ALBUMIN AGGREGATED 7.2 MCI.: 2.5 INJECTION, POWDER, FOR SOLUTION INTRAVENOUS at 23:24

## 2019-11-11 ASSESSMENT — ENCOUNTER SYMPTOMS
COLOR CHANGE: 0
NECK STIFFNESS: 0
HEADACHES: 0
NAUSEA: 0
DIFFICULTY URINATING: 0
CONFUSION: 0
ARTHRALGIAS: 0
COUGH: 1
ABDOMINAL PAIN: 0
EYE REDNESS: 0
FEVER: 0
SHORTNESS OF BREATH: 1

## 2019-11-11 ASSESSMENT — MIFFLIN-ST. JEOR: SCORE: 1995.94

## 2019-11-11 NOTE — ED PROVIDER NOTES
"    [facility] EMERGENCY DEPARTMENT ([facility])    11/11/19        History     Chief Complaint   Patient presents with     Hemoptysis     The history is provided by the patient, a friend and medical records.     Mariel Ribeiro is a 74 year old female with a past medical history significant for DM2, CKD, TIA, CHF, CAD s/p CABG, COPD, thyroid cancer s/p thyroidectomy c/b hypothyroidism, and HTN who presents here to the Emergency Department due to shortness of breath and hemoptysis. Patient reports that over the past few weeks she has been having shortness of breath that is worse with exertion and while laying flat. Patient reports that last night she felt a \"tear\" in her left upper chest and states that after she began having hemoptysis. Notes she had a spoon full of blood with her phlegm. Reports that she has noticed increased BLE pain and swelling. Denies similar symptoms in the past. Denies chest pain or nausea. Patient had a breast biopsy done on 11/6/2019 with her findings coming back today for Ductal Carcinoma in Situ. Denies previous DVT/PE. Denies previous GI bleed. Denies recent travel. Patient is not currently anticoagulated but had been in the past. Takes 81 mg of aspirin daily and is on bumex.    Wt Readings from Last 10 Encounters:   11/12/19 147 kg (324 lb)   10/31/19 (!) 150.6 kg (332 lb)   10/30/19 (!) 150.9 kg (332 lb 9.6 oz)   10/15/19 (!) 151 kg (333 lb)   10/01/19 146.5 kg (323 lb)   09/10/19 (!) 151.5 kg (334 lb)   09/05/19 149.7 kg (330 lb)   07/31/19 145.6 kg (321 lb)   07/01/19 147.4 kg (325 lb)   06/26/19 145.8 kg (321 lb 8 oz)       I have reviewed the Medications, Allergies, Past Medical and Surgical History, and Social History in the Epic system.    Review of Systems   Constitutional: Negative for fever.   HENT: Negative for congestion.    Eyes: Negative for redness.   Respiratory: Positive for cough and shortness of breath.         Positive for hemoptysis   Cardiovascular: Positive " "for leg swelling. Negative for chest pain.   Gastrointestinal: Negative for abdominal pain and nausea.   Genitourinary: Negative for difficulty urinating.   Musculoskeletal: Negative for arthralgias and neck stiffness.   Skin: Negative for color change.   Neurological: Negative for headaches.   Psychiatric/Behavioral: Negative for confusion.       Physical Exam   BP: (!) 148/52  Pulse: 67  Temp: 98.5  F (36.9  C)  Resp: 18  Height: 167.6 cm (5' 6\")  Weight: 147.4 kg (325 lb)  SpO2: 96 %      Physical Exam  Constitutional:       General: She is not in acute distress.     Appearance: She is obese. She is not toxic-appearing or diaphoretic.   HENT:      Head: Atraumatic.      Mouth/Throat:      Mouth: Mucous membranes are moist.      Pharynx: No oropharyngeal exudate.   Eyes:      General: No scleral icterus.     Pupils: Pupils are equal, round, and reactive to light.   Neck:      Musculoskeletal: No neck rigidity.   Cardiovascular:      Rate and Rhythm: Normal rate and regular rhythm.      Heart sounds: Normal heart sounds.   Pulmonary:      Effort: No respiratory distress.      Breath sounds: Normal breath sounds. No stridor. No wheezing.      Comments: Diminished breath sounds throughout all lung fields.  No wheezing or crackles.  No chest wall tenderness.  Abdominal:      General: Bowel sounds are normal.      Palpations: Abdomen is soft.      Tenderness: There is no tenderness.   Musculoskeletal:         General: No tenderness.      Right lower leg: Edema present.      Left lower leg: Edema present.   Skin:     General: Skin is warm.      Capillary Refill: Capillary refill takes less than 2 seconds.      Findings: No rash.   Neurological:      General: No focal deficit present.      Mental Status: She is alert and oriented to person, place, and time.   Psychiatric:         Mood and Affect: Mood normal.         ED Course   5:04 PM  The patient was seen and examined by Bruce Dejesus DO in Room ED03.         No " results found. However, due to the size of the patient record, not all encounters were searched. Please check Results Review for a complete set of results.    No results found. However, due to the size of the patient record, not all encounters were searched. Please check Results Review for a complete set of results.    Procedures             EKG Interpretation:      Interpreted by Bruce Dejesus DO.   Time reviewed: 17:45:04  Symptoms at time of EKG: Hemoptysis  Rhythm: Sinus Bradycardia  Rate: 58 BPM  Axis: normal  Ectopy: none  Conduction: normal  ST Segments/ T Waves: No ST-T wave changes  Q Waves: none  Comparison to prior: Unchanged    Clinical Impression: Sinus Bradycardia               Labs Ordered and Resulted from Time of ED Arrival Up to the Time of Departure from the ED   CBC WITH PLATELETS DIFFERENTIAL - Abnormal; Notable for the following components:       Result Value    Platelet Count 105 (*)     All other components within normal limits   BASIC METABOLIC PANEL - Abnormal; Notable for the following components:    Glucose 141 (*)     Urea Nitrogen 40 (*)     Creatinine 1.49 (*)     GFR Estimate 34 (*)     GFR Estimate If Black 40 (*)     All other components within normal limits   GLUCOSE BY METER - Abnormal; Notable for the following components:    Glucose 130 (*)     All other components within normal limits   INR   PARTIAL THROMBOPLASTIN TIME   TROPONIN I   NT PROBNP INPATIENT   GLUCOSE MONITOR NURSING POCT            Assessments & Plan (with Medical Decision Making)   Patient resented for evaluation of dyspnea and hemoptysis.  The dyspnea has been worsening over the past 2 weeks.  Gets worse with exertion.  Last night, she has a sharp pain in the right side of her chest followed by 2-3 episodes of approximately 1 to 2 cc of bright red blood.  She is never had this previously.  Recently had a left breast biopsy.  Also had a mems device placed 3 months ago.  Follows closely in the CHF  clinic.    Arrival, patient has slightly increased work of breathing.  She is not hypoxic.  Otherwise has normal vital signs.  She has diffuse edema bilateral lower extremities.  She has diminished breath sounds throughout all lung fields but no focal findings.  Initial differential includes pulmonary embolism, lung mass, surgical complication from previous biopsy, Maty-Liriano tear, Boerhaave's, pneumonia, TB.  Plan for labs including cardiac work-up.  Patient has an anaphylactic allergy to contrast, will obtain a VQ scan.    Chest x-ray is unremarkable.  Laboratory work-up does not show any significant abnormality.  Patient did have one additional episode of hemoptysis in the emergency department with approximately 2 cc of gregory red blood.  She has not had any nausea or vomiting.  I suspect that this is coming from the respiratory tract.   VQ scan is pending at this time.  Awaiting call from pulmonology service.  Patient will  need to be admitted to the hospital, will likely need bronchoscopy.  Will discuss case with the medicine service.      I have reviewed the nursing notes.    I have reviewed the findings, diagnosis, plan and need for follow up with the patient.    Discharge Medication List as of 2019  4:29 PM      START taking these medications    Details   doxycycline hyclate (VIBRA-TABS) 100 MG tablet Take 1 tablet (100 mg) by mouth 2 times daily, Disp-14 tablet, R-0, E-Prescribe      miconazole (MICATIN/MICRO GUARD) 2 % external powder Apply topically as needed for itching or other Redness in bilateral groin and katharine clefDisp-43 g, [id number]             Final diagnoses:   Hemoptysis     Boogie ROSEN, am serving as a trained medical scribe to document services personally performed by Bruce Dejesus DO, based on the provider's statements to me.   IBruce DO, was physically present and have reviewed and verified the accuracy of this note documented by Boogie Watkins.    2019   [facility],  [place], EMERGENCY DEPARTMENT     Burce Dejesus,   11/14/19 0045

## 2019-11-11 NOTE — Clinical Note
Charges have been dropped for [employer] billing. Please document and sign visit.   Flaco Foreman, [facility] Heart Failure Admin/Referrals

## 2019-11-11 NOTE — TELEPHONE ENCOUNTER
ONCOLOGY INTAKE: Records Information      APPT INFORMATION: 11/21/19 - Janet - [facility]  Referring provider:  Becki  Referring provider s clinic:  [facility]  Reason for visit/diagnosis:  DCIS left breast  Has patient been notified of appointment date and time?: Yes    RECORDS INFORMATION:  Were the records received with the referral (via [employer])? Internal referral - in [facility]    Has patient been seen for any external appt for this diagnosis? No    If yes, where? NA    Has patient had any imaging or procedures outside of [place] for this condition? No      If Yes, where? NA    ADDITIONAL INFORMATION:  Scheduled via [employer] from Divina MARTINEZ

## 2019-11-11 NOTE — TELEPHONE ENCOUNTER
Spoke to Mariel and her friend Odalis about the finding of Ductal Carcinoma In Situ found during her breast biopsy last week.  We discussed the Radiologist's recommendation of surgical consultation.  She is currently scheduled to meet with Dr. Anjelica Gonzales on Thursday, November 21st at 9[facility].  Mariel verbalized understanding and all questions and concerns were answered at this time.

## 2019-11-11 NOTE — PROGRESS NOTES
"[facility] CORE Clinic - CardioMEMS Reading Review      CardioMEMS reviewed and routed to patient's provider, Yuni SCHAEFER.   Current diuretic: Bumex 2mg TID  Recent plan of care changes: none    Called and talked with Mariel today.  Discussed the slow trend down of her CardioMEMS numbers, which is very positive. She has been within threshold.  Mariel states she's perplexed about that because she feels she's been more SOB.  Taking meds as prescribed. Also states she vomited a little blood today, not preceded by nasuea - when questioned more sounds like it was sputum from coughing, but unclear - I advised Mariel to call her PCP about this.  We agreed to scheduled a CORE visit next week with Yuni for physical assessment of SOB with labs. Mariel verbalizes understanding and agrees with plan of care. Serena Streeter RN      Current Threshold parameters:       Yesterday's Waveform:       Readings:           Serena Streeter RN BSN CHFN  Cardiology Care Coordinator - [facility]  Questions and [phone]  First press #1 for the [employer] and then press #4 for \"Your Care Team\" to reach us Cardiology Nurses.                   "

## 2019-11-11 NOTE — ED TRIAGE NOTES
"Pt arrives reporting bloody sputum x 1 day. NO hx of previous. Reports non productive cough x \"weeks\", denies fever. Denies CP, endorses TOWNSEND.   "

## 2019-11-11 NOTE — TELEPHONE ENCOUNTER
"Pt reported vomiting up a spoon full of blood. Thick phlegm like. This happened once last night.  No nausea. No pain.  Pt tolerated bfst today.    Had a left biopsy of breast recently.  Has a cough that irritates her throat.  Pt will go to  this afternoon.  If they have concerns, I did warn pt they might send her to the ER.  JODY Lopez        Reason for Disposition    Age > 60 years    Additional Information    Negative: Shock suspected (e.g., cold/pale/clammy skin, too weak to stand, low BP, rapid pulse)    Negative: Difficult to awaken or acting confused (e.g., disoriented, slurred speech)    Negative: Unable to walk, or can only walk with assistance (e.g., requires support)    Negative: Fainted    Negative: [1] Vomited blood AND [2] large amount (example: \"a cup of blood\")    Negative: Rectal bleeding (i.e., bloody stool, blood in stool)    Negative: Sounds like a life-threatening emergency to the triager    Negative: Coughing up blood  (i.e., from lungs)    Answer Assessment - Initial Assessment Questions  1. APPEARANCE of BLOOD: \"What does the blood look like?\" (e.g., color, coffee-grounds)      Dark red.   2. AMOUNT: \"How much blood was lost?\"      Small amount  3. VOMITING BLOOD: \"How many times did it happen?\" or \"How many times in the past 24 hours?\"      Once before going to bed. Had hard time breathing. Couldn't produce anything with cough.  4. VOMITING WITHOUT BLOOD: \"How many times in the past 24 hours?\"       None. One time with blood.  5. ONSET: \"When did vomiting of blood begin?\"      Before she went to bed.  6. CAUSE: \"What do you think is causing the vomiting of blood?\"      Doesn't know. Felt twinge above right breast.  7. BLOOD THINNERS: \"Do you take any blood thinners?\" (e.g., Coumadin/warfarin, Pradaxa/dabigatran, aspirin)      Asa 81 mg daily.  8. DEHYDRATION: \"Are there any signs of dehydration?\" \"When was the last time you urinated?\" \"Do you feel dizzy?\"      Urinates a lot. On bumex  9. " "ABDOMINAL PAIN: \"Are you having any abdominal pain?\" If yes: \"What does it feel like? \" (e.g., crampy, dull, intermittent, constant)       no  10. DIARRHEA: \"Is there any diarrhea?\" If so, ask: \"How many times today?\"         no  11. OTHER SYMPTOMS: \"Do you have any other symptoms?\" (e.g., fever, blood in stool)        no    Protocols used: VOMITING BLOOD-A-AH      "

## 2019-11-12 ENCOUNTER — PATIENT OUTREACH (OUTPATIENT)
Dept: CARE COORDINATION | Facility: CLINIC | Age: 74
End: 2019-11-12

## 2019-11-12 ENCOUNTER — APPOINTMENT (OUTPATIENT)
Dept: CT IMAGING | Facility: CLINIC | Age: 74
DRG: 204 | End: 2019-11-12
Attending: INTERNAL MEDICINE
Payer: MEDICARE

## 2019-11-12 VITALS
TEMPERATURE: 97.3 F | OXYGEN SATURATION: 95 % | HEART RATE: 67 BPM | WEIGHT: 293 LBS | SYSTOLIC BLOOD PRESSURE: 160 MMHG | BODY MASS INDEX: 47.09 KG/M2 | DIASTOLIC BLOOD PRESSURE: 63 MMHG | RESPIRATION RATE: 16 BRPM | HEIGHT: 66 IN

## 2019-11-12 PROBLEM — L30.4 INTERTRIGO: Status: ACTIVE | Noted: 2019-11-12

## 2019-11-12 PROBLEM — R04.2 HEMOPTYSIS: Status: ACTIVE | Noted: 2019-11-12

## 2019-11-12 LAB
GLUCOSE BLDC GLUCOMTR-MCNC: 151 MG/DL (ref 70–99)
GLUCOSE BLDC GLUCOMTR-MCNC: 196 MG/DL (ref 70–99)

## 2019-11-12 PROCEDURE — 71250 CT THORAX DX C-: CPT

## 2019-11-12 PROCEDURE — 25000131 ZZH RX MED GY IP 250 OP 636 PS 637: Mod: GY | Performed by: STUDENT IN AN ORGANIZED HEALTH CARE EDUCATION/TRAINING PROGRAM

## 2019-11-12 PROCEDURE — 12000001 ZZH R&B MED SURG/OB UMMC

## 2019-11-12 PROCEDURE — 25000132 ZZH RX MED GY IP 250 OP 250 PS 637: Mod: GY | Performed by: STUDENT IN AN ORGANIZED HEALTH CARE EDUCATION/TRAINING PROGRAM

## 2019-11-12 PROCEDURE — G0463 HOSPITAL OUTPT CLINIC VISIT: HCPCS

## 2019-11-12 PROCEDURE — 99239 HOSP IP/OBS DSCHRG MGMT >30: CPT | Performed by: INTERNAL MEDICINE

## 2019-11-12 PROCEDURE — 25000132 ZZH RX MED GY IP 250 OP 250 PS 637: Mod: GY | Performed by: EMERGENCY MEDICINE

## 2019-11-12 PROCEDURE — 00000146 ZZHCL STATISTIC GLUCOSE BY METER IP

## 2019-11-12 RX ORDER — TRAMADOL HYDROCHLORIDE 50 MG/1
50 TABLET ORAL EVERY 6 HOURS PRN
Status: DISCONTINUED | OUTPATIENT
Start: 2019-11-12 | End: 2019-11-12 | Stop reason: HOSPADM

## 2019-11-12 RX ORDER — AMOXICILLIN 250 MG
1-2 CAPSULE ORAL 2 TIMES DAILY PRN
Status: DISCONTINUED | OUTPATIENT
Start: 2019-11-12 | End: 2019-11-12 | Stop reason: HOSPADM

## 2019-11-12 RX ORDER — DEXTROSE MONOHYDRATE 25 G/50ML
25-50 INJECTION, SOLUTION INTRAVENOUS
Status: DISCONTINUED | OUTPATIENT
Start: 2019-11-12 | End: 2019-11-12 | Stop reason: HOSPADM

## 2019-11-12 RX ORDER — FOLIC ACID 1 MG/1
2 TABLET ORAL DAILY
Status: DISCONTINUED | OUTPATIENT
Start: 2019-11-12 | End: 2019-11-12 | Stop reason: HOSPADM

## 2019-11-12 RX ORDER — POTASSIUM CHLORIDE 750 MG/1
20 TABLET, EXTENDED RELEASE ORAL 3 TIMES DAILY
Status: DISCONTINUED | OUTPATIENT
Start: 2019-11-12 | End: 2019-11-12 | Stop reason: HOSPADM

## 2019-11-12 RX ORDER — CAPSAICIN 0.025 %
1 CREAM (GRAM) TOPICAL 3 TIMES DAILY PRN
Status: DISCONTINUED | OUTPATIENT
Start: 2019-11-12 | End: 2019-11-12 | Stop reason: HOSPADM

## 2019-11-12 RX ORDER — REPAGLINIDE 0.5 MG/1
0.5 TABLET ORAL
Status: DISCONTINUED | OUTPATIENT
Start: 2019-11-12 | End: 2019-11-12 | Stop reason: HOSPADM

## 2019-11-12 RX ORDER — NALOXONE HYDROCHLORIDE 0.4 MG/ML
.1-.4 INJECTION, SOLUTION INTRAMUSCULAR; INTRAVENOUS; SUBCUTANEOUS
Status: DISCONTINUED | OUTPATIENT
Start: 2019-11-12 | End: 2019-11-12 | Stop reason: HOSPADM

## 2019-11-12 RX ORDER — BUPROPION HYDROCHLORIDE 150 MG/1
150 TABLET ORAL EVERY MORNING
Status: DISCONTINUED | OUTPATIENT
Start: 2019-11-12 | End: 2019-11-12 | Stop reason: HOSPADM

## 2019-11-12 RX ORDER — NIACIN 500 MG/1
500 TABLET, EXTENDED RELEASE ORAL 2 TIMES DAILY
Status: DISCONTINUED | OUTPATIENT
Start: 2019-11-12 | End: 2019-11-12 | Stop reason: HOSPADM

## 2019-11-12 RX ORDER — ACETAMINOPHEN 500 MG
500 TABLET ORAL ONCE
Status: COMPLETED | OUTPATIENT
Start: 2019-11-12 | End: 2019-11-12

## 2019-11-12 RX ORDER — TRAZODONE HYDROCHLORIDE 150 MG/1
150 TABLET ORAL AT BEDTIME
Status: DISCONTINUED | OUTPATIENT
Start: 2019-11-12 | End: 2019-11-12 | Stop reason: HOSPADM

## 2019-11-12 RX ORDER — LEVOTHYROXINE SODIUM 75 UG/1
150 TABLET ORAL DAILY
Status: DISCONTINUED | OUTPATIENT
Start: 2019-11-12 | End: 2019-11-12 | Stop reason: HOSPADM

## 2019-11-12 RX ORDER — METOPROLOL TARTRATE 50 MG
50 TABLET ORAL 2 TIMES DAILY
Status: DISCONTINUED | OUTPATIENT
Start: 2019-11-12 | End: 2019-11-12 | Stop reason: HOSPADM

## 2019-11-12 RX ORDER — NITROGLYCERIN 0.4 MG/1
0.4 TABLET SUBLINGUAL EVERY 5 MIN PRN
Status: DISCONTINUED | OUTPATIENT
Start: 2019-11-12 | End: 2019-11-12 | Stop reason: HOSPADM

## 2019-11-12 RX ORDER — PREGABALIN 100 MG/1
100 CAPSULE ORAL 2 TIMES DAILY
Status: DISCONTINUED | OUTPATIENT
Start: 2019-11-12 | End: 2019-11-12 | Stop reason: HOSPADM

## 2019-11-12 RX ORDER — LIDOCAINE 40 MG/G
CREAM TOPICAL
Status: DISCONTINUED | OUTPATIENT
Start: 2019-11-12 | End: 2019-11-12 | Stop reason: HOSPADM

## 2019-11-12 RX ORDER — ATORVASTATIN CALCIUM 40 MG/1
40 TABLET, FILM COATED ORAL DAILY
Status: DISCONTINUED | OUTPATIENT
Start: 2019-11-12 | End: 2019-11-12 | Stop reason: HOSPADM

## 2019-11-12 RX ORDER — LOSARTAN POTASSIUM 50 MG/1
50 TABLET ORAL 2 TIMES DAILY
Status: DISCONTINUED | OUTPATIENT
Start: 2019-11-12 | End: 2019-11-12 | Stop reason: HOSPADM

## 2019-11-12 RX ORDER — DOXYCYCLINE HYCLATE 100 MG
100 TABLET ORAL 2 TIMES DAILY
Qty: 14 TABLET | Refills: 0 | Status: SHIPPED | OUTPATIENT
Start: 2019-11-12 | End: 2020-02-27

## 2019-11-12 RX ORDER — GUAIFENESIN 600 MG/1
600 TABLET, EXTENDED RELEASE ORAL 2 TIMES DAILY
Status: DISCONTINUED | OUTPATIENT
Start: 2019-11-12 | End: 2019-11-12 | Stop reason: HOSPADM

## 2019-11-12 RX ORDER — BUMETANIDE 1 MG/1
2 TABLET ORAL 3 TIMES DAILY
Status: DISCONTINUED | OUTPATIENT
Start: 2019-11-12 | End: 2019-11-12 | Stop reason: HOSPADM

## 2019-11-12 RX ORDER — NICOTINE POLACRILEX 4 MG
15-30 LOZENGE BUCCAL
Status: DISCONTINUED | OUTPATIENT
Start: 2019-11-12 | End: 2019-11-12 | Stop reason: HOSPADM

## 2019-11-12 RX ORDER — ALBUTEROL SULFATE 90 UG/1
1-2 AEROSOL, METERED RESPIRATORY (INHALATION) EVERY 4 HOURS PRN
Status: DISCONTINUED | OUTPATIENT
Start: 2019-11-12 | End: 2019-11-12 | Stop reason: HOSPADM

## 2019-11-12 RX ORDER — ESCITALOPRAM OXALATE 10 MG/1
20 TABLET ORAL EVERY MORNING
Status: DISCONTINUED | OUTPATIENT
Start: 2019-11-12 | End: 2019-11-12 | Stop reason: HOSPADM

## 2019-11-12 RX ORDER — ACETAMINOPHEN 325 MG/1
650 TABLET ORAL EVERY 4 HOURS PRN
Status: DISCONTINUED | OUTPATIENT
Start: 2019-11-12 | End: 2019-11-12 | Stop reason: HOSPADM

## 2019-11-12 RX ORDER — ASPIRIN 81 MG/1
81 TABLET ORAL DAILY
Status: DISCONTINUED | OUTPATIENT
Start: 2019-11-12 | End: 2019-11-12 | Stop reason: HOSPADM

## 2019-11-12 RX ORDER — ACETAMINOPHEN 500 MG
1000 TABLET ORAL ONCE
Status: COMPLETED | OUTPATIENT
Start: 2019-11-12 | End: 2019-11-12

## 2019-11-12 RX ADMIN — BUMETANIDE 2 MG: 1 TABLET ORAL at 15:32

## 2019-11-12 RX ADMIN — ACETAMINOPHEN 500 MG: 500 TABLET ORAL at 02:59

## 2019-11-12 RX ADMIN — BUMETANIDE 2 MG: 1 TABLET ORAL at 09:01

## 2019-11-12 RX ADMIN — LEVOTHYROXINE SODIUM 150 MCG: 75 TABLET ORAL at 09:01

## 2019-11-12 RX ADMIN — GUAIFENESIN 600 MG: 600 TABLET ORAL at 09:01

## 2019-11-12 RX ADMIN — ASPIRIN 81 MG: 81 TABLET, COATED ORAL at 09:01

## 2019-11-12 RX ADMIN — LOSARTAN POTASSIUM 50 MG: 50 TABLET, FILM COATED ORAL at 09:01

## 2019-11-12 RX ADMIN — CHOLECALCIFEROL CAP 125 MCG (5000 UNIT) 10000 UNITS: 125 CAP at 09:01

## 2019-11-12 RX ADMIN — FOLIC ACID 2 MG: 1 TABLET ORAL at 09:02

## 2019-11-12 RX ADMIN — INSULIN GLARGINE 22 UNITS: 100 INJECTION, SOLUTION SUBCUTANEOUS at 11:11

## 2019-11-12 RX ADMIN — ESCITALOPRAM OXALATE 20 MG: 10 TABLET ORAL at 09:01

## 2019-11-12 RX ADMIN — ATORVASTATIN CALCIUM 40 MG: 40 TABLET, FILM COATED ORAL at 09:02

## 2019-11-12 RX ADMIN — PREGABALIN 100 MG: 100 CAPSULE ORAL at 09:01

## 2019-11-12 RX ADMIN — POTASSIUM CHLORIDE 20 MEQ: 750 TABLET, EXTENDED RELEASE ORAL at 15:31

## 2019-11-12 RX ADMIN — ACETAMINOPHEN 500 MG: 500 TABLET ORAL at 03:16

## 2019-11-12 RX ADMIN — POTASSIUM CHLORIDE 20 MEQ: 750 TABLET, EXTENDED RELEASE ORAL at 09:01

## 2019-11-12 RX ADMIN — METOPROLOL TARTRATE 50 MG: 50 TABLET, FILM COATED ORAL at 09:02

## 2019-11-12 RX ADMIN — NIACIN 500 MG: 500 TABLET, EXTENDED RELEASE ORAL at 09:01

## 2019-11-12 ASSESSMENT — ACTIVITIES OF DAILY LIVING (ADL)
ADLS_ACUITY_SCORE: 22
ADLS_ACUITY_SCORE: 21
ADLS_ACUITY_SCORE: 22

## 2019-11-12 ASSESSMENT — MIFFLIN-ST. JEOR: SCORE: 1991.4

## 2019-11-12 NOTE — PLAN OF CARE
Patient came to unit around 0430 with shortness of breath and coughing up blood. Last episode of coughing up blood was in the ED yesterday afternoon (per report bright red blood).   Alert and oriented. Ax1 with a cane. Voids spontaneously and sates her last BM was yesterday (11/11). Lives at home with a roommate.     High blood pressure on room air.     Patient has pain in her left shoulder.     Skin is moist under panus and blanchable redness on bottom (WOC ordered).     Will continue to monitor     Willa Lazaro RN on 11/12/2019 at 8:08 AM

## 2019-11-12 NOTE — PLAN OF CARE
Pt alert, oriented x4, VSS on RA except mild HTN that improved with scheduled BP meds. Pt with infrequent cough, no blood present. Pulmonary consulted today and ordered chest CT, declined bronch at this time. Oral repaglinide not given today because it was unavailable at pharmacy. Pt stable to discharge home today on oral abx. PIV removed. AVS reviewed with pt. All questions answered. All belongings going with pt. Oral abx and miconazole powder will be picked up at outside pharmacy. Pt going home with friend. Adequate for discharge.
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
## 2019-11-12 NOTE — PHARMACY-ADMISSION MEDICATION HISTORY
Admission medication history interview status for the 11/11/2019 admission is complete. See Epic admission navigator for allergy information, pharmacy, prior to admission medications and immunization status.     Medication history interview sources:  the patient, chart review    Changes made to PTA medication list (reason)  Added: NA  Deleted: NA  Changed: NA    Additional medication history information (including reliability of information, actions taken by pharmacist):  Patient appeared to be a reliable historian.  Patient states that she is not taking bupropion or tramadol because she is concerned with side effects. She has an outpatient appointment with her MD to discuss these concerns further.    Prior to Admission medications    Medication Sig Last Dose Taking? Auth Provider   acetaminophen (TYLENOL) 325 MG tablet Take 650 mg by mouth every 4 hours as needed for mild pain Take 2 tablets by mouth every 4 hours as needed for mild pain PRN Yes Brittany Fuentes PA-C   albuterol (PROAIR HFA) 108 (90 Base) MCG/ACT inhaler Inhale 1-2 puffs into the lungs every 4 hours as needed for wheezing PRN Yes Brittany Fuentes PA-C   aspirin (ASA) 81 MG EC tablet Take 1 tablet (81 mg) by mouth daily Past Week at Unknown time Yes Brittany Fuentes PA-C   atorvastatin (LIPITOR) 40 MG tablet TAKE 1 TABLET(40 MG) BY MOUTH DAILY Past Week at Unknown time Yes Amee Connell MD   bumetanide (BUMEX) 1 MG tablet Take 2 tablets (2 mg) by mouth 3 times daily Or as directed by CORE Past Week at Unknown time Yes Starla Saez, GRISEL   capsaicin (ZOSTRIX) 0.025 % external cream Apply 1 g topically 3 times daily For neuropathic pain. Past Week at Unknown time Yes Muriel Will PA-C   doxycycline hyclate (VIBRA-TABS) 100 MG tablet Take 1 tablet (100 mg) by mouth 2 times daily  Yes Jostin Sears MD   EPINEPHrine (EPIPEN/ADRENACLICK/OR ANY BX GENERIC EQUIV) 0.3 MG/0.3ML injection 2-pack Inject 0.3  mLs (0.3 mg) into the muscle once as needed for anaphylaxis PRN Yes Brittany Fuentes PA-C   escitalopram (LEXAPRO) 20 MG tablet Take 1 tablet (20 mg) by mouth every morning 2019 at Unknown time Yes Amee Connell MD   ferrous gluconate (FERGON) 324 (38 Fe) MG tablet Take 1 tablet (324 mg) by mouth Every Mon, Wed, Fri Morning 2019 at Unknown time Yes Amee Connell MD   folic acid (FOLVITE) 1 MG tablet Take 2 tablets (2 mg) by mouth daily Past Week at Unknown time Yes Brittany Fuentes PA-C   guaiFENesin (MUCINEX) 600 MG 12 hr tablet Take 1 tablet (600 mg) by mouth 2 times daily Past Week at Unknown time Yes Brittany Fuentes PA-C   insulin glargine (LANTUS SOLOSTAR PEN) 100 UNIT/ML pen Inject   22 units  daily subcutaneously call  if blood sugar <70, often >200. Past Week at Unknown time Yes Muriel Will PA-C   levothyroxine (SYNTHROID) 150 MCG tablet Take 1 tablet (150 mcg) by mouth daily 2019 at Unknown time Yes Amee Connell MD   losartan (COZAAR) 50 MG tablet Take 1 tablet (50 mg) by mouth 2 times daily 2019 at Unknown time Yes Stephanie Wolf MD   metoprolol tartrate (LOPRESSOR) 50 MG tablet Take 1 tablet (50 mg) by mouth 2 times daily 2019 at Unknown time Yes Amee Connell MD   miconazole (MICATIN/MICRO GUARD) 2 % external powder Apply topically as needed for itching or other Redness in bilateral groin and  clef  Yes Jostin Sears MD   niacin ER (NIASPAN) 500 MG CR tablet TAKE 1 TABLET(500 MG) BY MOUTH TWICE DAILY Past Week at Unknown time Yes Amee Connell MD   nitroGLYcerin (NITROSTAT) 0.4 MG sublingual tablet For chest pain place 1 tablet under the tongue every 5 minutes for 3 doses. If symptoms persist 5 minutes after 1st dose call 911. PRN Yes Brittany Fuentes PA-C   potassium chloride ER (K-DUR/KLOR-CON M) 10 MEQ CR tablet Take 2  tablets (20 mEq) by mouth 3 times daily Past Week at Unknown time Yes Starla Saez, GRISEL   pregabalin (LYRICA) 100 MG capsule Take 1 capsule (100 mg) by mouth 2 times daily Past Week at Unknown time Yes Brittany Fuentes PA-C   repaglinide (PRANDIN) 0.5 MG tablet Take 1 tablet (0.5 mg) by mouth 3 times daily (before meals) Past Week at Unknown time Yes Muriel Will PA-C   senna-docusate (SENOKOT-S/PERICOLACE) 8.6-50 MG tablet Take 1-2 tablets by mouth 2 times daily as needed for constipation Past Week at Unknown time Yes Brittany Fuentes PA-C   traZODone (DESYREL) 50 MG tablet TAKE 3 TABLETS(150 MG) BY MOUTH AT BEDTIME 11/10/2019 at Unknown time Yes Amee Connell MD   vitamin D3 (CHOLECALCIFEROL) 35261 units (250 mcg) capsule Take 1 capsule (10,000 Units) by mouth daily Past Week at Unknown time Yes Brittany Fuentes PA-C   blood glucose monitoring (NO BRAND SPECIFIED) meter device kit Use to test blood sugar four times daily or as directed.   Rafaela William MD   buPROPion (WELLBUTRIN XL) 150 MG 24 hr tablet Take 1 tablet (150 mg) by mouth every morning NOT TAKING at time  Amee Connell MD   traMADol (ULTRAM) 50 MG tablet Take 1 tablet (50 mg) by mouth every 6 hours as needed for breakthrough pain or severe pain NOT TAKING  Amee Connell MD     Medication history completed by: Comfort Grace: PharmD student class of 2020

## 2019-11-12 NOTE — PROGRESS NOTES
WOC Consult    S: WOC Consult for rash under pannus and katharine cleft.    B: See H&P.    A: Redness in bilateral groin and  cleft, slight odor, itching. Probable fungal rash to bilateral groin and perineum.    P: Would recommend miconazole powder. Discussed with bedside RN to discuss with MD as patient is discharging today.    WOC will sign off.    Rosalina Pablo RN, CWOCN

## 2019-11-12 NOTE — PLAN OF CARE
4 eyes cmpleted- yes    2 RN- Dahlia HOU and Willa RAMIREZ    [facility] ordered- yes     Moisture under panus and blanchable redness on bottom.     Willa Lazaro RN on 11/12/2019 at 8:09 AM

## 2019-11-12 NOTE — DISCHARGE SUMMARY
[facility], [place]  Hospitalist Discharge Summary       Date of Admission:  11/11/2019  Date of Discharge:  11/12/2019  Discharging Provider: Jostin Sears MD  Discharge Team: Hospitalist Service, Gold 1    Discharge Diagnoses   Hemoptysis  Acute Bacterial Sinusitis  Ductal Carcinoma in Situ of left breast    Follow-ups Needed After Discharge   Follow-up Appointments     Adult [facility] Follow-up and recommended labs and tests      Follow up with primary care provider, Amee Connell, within 7-14   days for hospital follow- up.  No follow up labs or test are needed.      Appointments on [place] and/or [facility] (with [facility] or [facility]   provider or service). Call [phone] if you haven't heard regarding   these appointments within 7 days of discharge.             Unresulted Labs Ordered in the Past 30 Days of this Admission     No orders found for last 31 day(s).      These results will be followed up by NA    Discharge Disposition   Discharged to home  Condition at discharge: Stable    Hospital Course   Mariel Ribeiro is a 74 year old female with a past medical history significant for  T2DM, CKD, TIA, CHF, CAD s/p 3v CABG (in 2018), mild COPD, thyroid cancer s/p thyroidectomy c/b hypothyroidism, and HTN who  is admitted for management of shortness of breath and hemoptysis.     # Hemoptysis   # Acute bacterial sinusitis  Presented with shortness of breath and hemoptysis in the setting of recent uncomplicated core needle biopsy of breast (11/6/2019). Exam on admission with mildly increased work of breathing, diminished sounds, and bilateral edema. No evidence of foreign body. Labs show no leukocytosis. Trop/ECG negative for ischemia; QTC borderline high 471. Pro BNP nl. INR 1.04. Lytes. Creatinine elevated to 1.49 but has CKD and improved from previous levels. CXR without consolidation. Lung perfusion scan w/o evidence of V/Q mismatch to suggest PE. No evidence or  "aortic valve stenosis on echo 4/25/19. Patient reported increased sinus symptoms for the past two weeks prior to admission. Interventional pulmonology consulted and recommended CT scan. CT scan showed ground glass opacities and small pulmonary nodules with no overt signs or indications for etiology of hemoptysis. No indication for bronchoscopy. Remained hemodynamically stable on room air with no further episodes of hemoptysis overnight.  Patient was started on doxycycline (penicillin allergy) for acute sinusitis with instructions to follow up with her primary care provider in 7-14 days for hospital follow up.     # Ductal Carcinoma in Situ of left breast  Nuclear grade 2, confirmed w/biopsy. Has a follow up appointment with Dr. Gonzales to discuss biopsy results/next steps. CT scan performed for evaluation of hemoptysis revealed ground glass opacities and small pulmonary nodules. Radiology recommended short term 3-month follow up given history. Patient to follow up with Dr. Gonzales.      Consultations This Hospital Stay   INTERVENTIONAL PULMONARY ADULT IP CONSULT  WOUND OSTOMY CONTINENCE NURSE  IP CONSULT    Code Status   Full Code    Time Spent on this Encounter   I, Jostin Sears MD, personally saw the patient today and spent greater than 30 minutes discharging this patient.       Jostin Sears MD  [facility], [place]  ______________________________________________________________________    Physical Exam   BP (!) 167/57 (BP Location: Right arm)   Pulse 72   Temp 97.4  F (36.3  C) (Axillary)   Resp 16   Ht 1.676 m (5' 6\")   Wt 147 kg (324 lb)   SpO2 94%   BMI 52.29 kg/m    General: AAOx3, well appearing woman in NAD  Skin: no rashes or bruising  HEENT: Neck supple, no lymphadenopathy  CV: RRR, normal S1S2, no murmur  Resp: CTAB, no wheeze, rhonchi   Abd: Soft, non-tender, non distended, BS+, no masses appreciated  Extremities: warm and well perfused, bilateral lower extremity " edema with compression socks         Primary Care Physician   Amee Connell    Discharge Orders      Reason for your hospital stay    Dear Mariel Ribeiro,    Your were hospitalized at [facility] with hemoptysis and monitored overnight.  Over your hospitalization your hemoptysis resolved and today you are ready to be discharged home.  You should continue to improve but if you develop fever, shortness of breath, increased cough, bleeding, lightheadedness or chest pain please seek medical attention.    We are suggesting the following medication changes:  Start doxycycline 100 mg twice daily for 7 days to cover empiric course for sinusitis.    Please get the following tests done:  N/A    Please set up an appointment with:  Your primary care doctor as previously scheduled.     It was a pleasure meeting with you today. Thank you for allowing me and my team the privilege of caring for you during your hospitalization. You are the reason we are here, and I truly hope we provided you with the excellent service you deserve. Please let us know if there is anything else we can do for you so that we can be sure you are leaving completely satisfied with your care experience.    Your hospital unit at the time of discharge is 5A so if you have any questions please call the hospital at [phone] and ask to talk to a nurse on 5A.     Sincerely,    Jostin Sears  Internal Medicine Hospitalist  [facility]'     Adult [facility] Follow-up and recommended labs and tests    Follow up with primary care provider, Amee Connell, within 7-14 days for hospital follow- up.  No follow up labs or test are needed.      Appointments on [place] and/or [facility] (with [facility] or [facility] provider or service). Call [phone] if you haven't heard regarding these appointments within 7 days of discharge.     Activity    Your activity upon discharge: activity as tolerated     When to contact  your care team    Call your primary doctor if you have any of the following: temperature greater than 101F, increased shortness of breath, increased pain, chest pain or coughing up blood.     Full Code     Diet    Follow this diet upon discharge: Regular Diet Adult       Significant Results and Procedures   Most Recent 3 CBC's:  Recent Labs   Lab Test 11/11/19  1851 07/01/19  0752 06/13/19  0949   WBC 6.7 5.2 6.5   HGB 12.8 13.6 11.8   MCV 90 92 94   * 110* 119*   ,   Results for orders placed or performed during the hospital encounter of 11/11/19   XR Chest 2 Views    Narrative    CHEST TWO VIEWS  11/11/2019 6:17 PM     HISTORY:  Hemoptysis.    COMPARISON: 4/25/2019.      Impression    IMPRESSION: Sternotomy. Cardiac enlargement. Pulmonary vascularity is  within normal limits. No pleural effusions. Lungs clear.    YONG OLIVER MD   Lung vent and perf (NM)    Narrative    NM LUNG SCAN VENTILATION AND PERFUSION  11/11/2019 11:54 PM    HISTORY: Pulmonary embolus suspected, high pretest probability.  Hemoptysis. Cough.     TECHNIQUE: 7.2 mCi technetium 99m MAA. 2.0 mCi DTPA aerosol.    COMPARISON: Chest x-ray 11/11/2019. VQ scan 4/25/2019.    FINDINGS: No ventilation/perfusion mismatch or significant segmental  perfusion defects. A few small nonsegmental areas of perfusion  abnormality matched on ventilation imaging. There are some patchy  areas of retention of aerosol on ventilation images suggesting COPD.      Impression    IMPRESSION: No significant V/Q mismatches to suggest pulmonary  embolism. Low probability.    IVA GEE MD   CT Chest w/o Contrast    Narrative    EXAMINATION: Chest CT  11/12/2019 11:08 AM    CLINICAL HISTORY: hemoptysis    COMPARISON: V/Q study 11/11/2019. Ultrasound 10/25/2019..    TECHNIQUE: CT imaging obtained through the chest the contrast. Coronal  and axial MIP reformatted images obtained.     FINDINGS:  Post operative changes of thyroidectomy. There is no  axillary  lymphadenopathy. There is no mediastinal or hilar lymphadenopathy.  Heart size is borderline enlarged. There is no pericardial effusion.  Heavy coronary artery calcification and coronary artery stents. Aortic  valve calcifications. Normal configuration and diameter of the great  vessels. Heavy atheromatous plaque throughout the aortic arch and its  major branches. The central tracheobronchial tree is patent. The  thoracic esophagus is unremarkable. Cardiomems device.    Trace left pleural effusion. No pneumothorax. Bibasilar dependent  atelectasis. Accessory azygos fissure/lobe. Mild centrilobular  emphysematous change. Atelectasis versus scarring along the right  minor fissure in the right upper lobe. Reticular and ground glass  opacities along the lateral left upper lobe and left lower lobe.  - 4 mm pulmonary nodule base of the left lower lobe (series 6, image  255).  - 7 mm solid pulmonary nodule medial right lower lobe (series 6, image  241).  - 3 mm pulmonary nodule right lower lobe adjacent to the right major  fissure (series 6, image 147).  - 3 mm pulmonary nodule right upper lobe (series 6, image 122).    Bones and soft tissues: No suspicious bone findings. Postoperative  changes of sternotomy with intact sternotomy wires. There is what  appears to be chronic fracture inferior 1/3rd of the sternum with 7mm  of anterior deviation.  Chronic fractures left posterior ribs. Flowing  syndesmophytes, consistent with DISH. Advanced degenerative change of  the left shoulder.    Partially imaged upper abdomen: Limited. Lateral herniation of the  left lobe of the liver. 2.9 x 2.3 cm hyperdense lesion medial to the  left breast (series 2, image 35), lesion is contiguous with the skin  surface likely a sebaceous or dermoid cyst.       Impression    IMPRESSION:   1. Reticular and groundglass opacities in the lateral left upper lobe  and left lower lobe, may represent infection.  2. Multiple solid pulmonary  nodules with the largest measuring 7 mm  within the medial right lower lobe, recommend short-term follow-up in  approximately 3 months.  3. Chronic appearing fracture of the inferior one third of the sternum  with 7 mm anterior deviation.  4. 2.9 cm hypodense lesion medial to the left breast, cysts most  consistent with a sebaceous or dermoid cyst. Previously evaluated by  ultrasound on 10/25/2019.  5. Ventral herniation of the left lobe of the liver.    I have personally reviewed the examination and initial interpretation  and I agree with the findings.    RAFITA ENGLAND MD     *Note: Due to a large number of results and/or encounters for the requested time period, some results have not been displayed. A complete set of results can be found in Results Review.       Discharge Medications   Current Discharge Medication List      START taking these medications    Details   doxycycline hyclate (VIBRA-TABS) 100 MG tablet Take 1 tablet (100 mg) by mouth 2 times daily  Qty: 14 tablet, Refills: 0    Associated Diagnoses: Hemoptysis         CONTINUE these medications which have NOT CHANGED    Details   escitalopram (LEXAPRO) 20 MG tablet Take 1 tablet (20 mg) by mouth every morning  Qty: 90 tablet, Refills: 1    Associated Diagnoses: Recurrent major depressive disorder, in partial remission (H)      ferrous gluconate (FERGON) 324 (38 Fe) MG tablet Take 1 tablet (324 mg) by mouth Every Mon, Wed, Fri Morning  Qty: 36 tablet, Refills: 3    Associated Diagnoses: Chronic diastolic heart failure (H); Iron deficiency anemia secondary to inadequate dietary iron intake      levothyroxine (SYNTHROID) 150 MCG tablet Take 1 tablet (150 mcg) by mouth daily  Qty: 90 tablet, Refills: 3    Associated Diagnoses: Other specified hypothyroidism      losartan (COZAAR) 50 MG tablet Take 1 tablet (50 mg) by mouth 2 times daily  Qty: 135 tablet, Refills: 3    Associated Diagnoses: Chronic diastolic heart failure (H)      metoprolol tartrate  (LOPRESSOR) 50 MG tablet Take 1 tablet (50 mg) by mouth 2 times daily  Qty: 180 tablet, Refills: 3      traZODone (DESYREL) 50 MG tablet TAKE 3 TABLETS(150 MG) BY MOUTH AT BEDTIME  Qty: 270 tablet, Refills: 1    Associated Diagnoses: Insomnia, unspecified type      acetaminophen (TYLENOL) 325 MG tablet Take 650 mg by mouth every 4 hours as needed for mild pain Take 2 tablets by mouth every 4 hours as needed for mild pain  Qty: 100 tablet    Associated Diagnoses: S/P CABG x 3; Acute post-operative pain      albuterol (PROAIR HFA) 108 (90 Base) MCG/ACT inhaler Inhale 1-2 puffs into the lungs every 4 hours as needed for wheezing  Qty: 8.5 g    Associated Diagnoses: Chronic obstructive pulmonary disease, unspecified COPD type (H)      aspirin (ASA) 81 MG EC tablet Take 1 tablet (81 mg) by mouth daily    Associated Diagnoses: (HFpEF) heart failure with preserved ejection fraction (H)      atorvastatin (LIPITOR) 40 MG tablet TAKE 1 TABLET(40 MG) BY MOUTH DAILY  Qty: 90 tablet, Refills: 1    Associated Diagnoses: Atherosclerosis of native coronary artery without angina pectoris, unspecified whether native or transplanted heart      blood glucose monitoring (NO BRAND SPECIFIED) meter device kit Use to test blood sugar four times daily or as directed.  Qty: 1 kit, Refills: 0    Comments: One touch mini  Associated Diagnoses: Type 2 diabetes mellitus with stage 3 chronic kidney disease, with long-term current use of insulin (H)      bumetanide (BUMEX) 1 MG tablet Take 2 tablets (2 mg) by mouth 3 times daily Or as directed by CORE  Qty: 360 tablet, Refills: 3    Associated Diagnoses: Chronic diastolic heart failure (H)      buPROPion (WELLBUTRIN XL) 150 MG 24 hr tablet Take 1 tablet (150 mg) by mouth every morning  Qty: 30 tablet, Refills: 3    Associated Diagnoses: XU (generalized anxiety disorder); Mild major depression (H)      capsaicin (ZOSTRIX) 0.025 % external cream Apply 1 g topically 3 times daily For neuropathic  pain.  Qty: 60 g, Refills: 1    Associated Diagnoses: Type 2 diabetes mellitus with stage 3 chronic kidney disease, with long-term current use of insulin (H)      Continuous Blood Gluc  (FREESTYLE MARTY 14 DAY READER) JEZ 1 Units 4 times daily (before meals and nightly)  Qty: 1 Device, Refills: 0    Associated Diagnoses: Type 2 diabetes mellitus with stage 3 chronic kidney disease, with long-term current use of insulin (H)      Continuous Blood Gluc Sensor (FREESTYLE MARTY 14 DAY SENSOR) MISC 1 Units 4 times daily (before meals and nightly)  Qty: 6 each, Refills: 3    Associated Diagnoses: Type 2 diabetes mellitus with stage 3 chronic kidney disease, with long-term current use of insulin (H)      EPINEPHrine (EPIPEN/ADRENACLICK/OR ANY BX GENERIC EQUIV) 0.3 MG/0.3ML injection 2-pack Inject 0.3 mLs (0.3 mg) into the muscle once as needed for anaphylaxis  Qty: 0.6 mL, Refills: 0    Associated Diagnoses: Allergic reaction, subsequent encounter      folic acid (FOLVITE) 1 MG tablet Take 2 tablets (2 mg) by mouth daily    Associated Diagnoses: (HFpEF) heart failure with preserved ejection fraction (H)      guaiFENesin (MUCINEX) 600 MG 12 hr tablet Take 1 tablet (600 mg) by mouth 2 times daily    Associated Diagnoses: (HFpEF) heart failure with preserved ejection fraction (H)      insulin glargine (LANTUS SOLOSTAR PEN) 100 UNIT/ML pen Inject   22 units  daily subcutaneously call  if blood sugar <70, often >200.  Qty: 15 mL, Refills: 3    Associated Diagnoses: Type 2 diabetes mellitus with stage 3 chronic kidney disease, with long-term current use of insulin (H)      [employer] Pharm[employer]CAN FINEPOINT LANCETS MISC Use to test blood sugars 2 times daily or as directed.  Qty: 100 each, Refills: prn    Associated Diagnoses: Type 2 diabetes mellitus with diabetic polyneuropathy, without long-term current use of insulin (H)      niacin ER (NIASPAN) 500 MG CR tablet TAKE 1 TABLET(500 MG) BY MOUTH TWICE DAILY  Qty: 180 tablet, Refills: 2  "   Associated Diagnoses: Hyperlipidemia with target LDL less than 70      nitroGLYcerin (NITROSTAT) 0.4 MG sublingual tablet For chest pain place 1 tablet under the tongue every 5 minutes for 3 doses. If symptoms persist 5 minutes after 1st dose call 911.  Qty: 25 tablet, Refills: 0    Associated Diagnoses: Atherosclerosis of native coronary artery without angina pectoris, unspecified whether native or transplanted heart      ONE TOUCH ULTRA (DEVICES) MISC test blood sugar BID  Qty: 1, Refills: 0    Associated Diagnoses: Type II or unspecified type diabetes mellitus without mention of complication, not stated as uncontrolled      ONETOUCH ULTRA test strip USE FOUR TIMES DAILY  Qty: 400 strip, Refills: 1    Associated Diagnoses: Type 2 diabetes mellitus with diabetic polyneuropathy, without long-term current use of insulin (H)      potassium chloride ER (K-DUR/KLOR-CON M) 10 MEQ CR tablet Take 2 tablets (20 mEq) by mouth 3 times daily  Qty: 120 tablet, Refills: 3    Associated Diagnoses: Chronic diastolic heart failure (H)      pregabalin (LYRICA) 100 MG capsule Take 1 capsule (100 mg) by mouth 2 times daily  Qty: 180 capsule, Refills: 3    Associated Diagnoses: Pain in joint of left shoulder      repaglinide (PRANDIN) 0.5 MG tablet Take 1 tablet (0.5 mg) by mouth 3 times daily (before meals)  Qty: 270 tablet, Refills: 3    Associated Diagnoses: Type 2 diabetes mellitus with stage 3 chronic kidney disease, with long-term current use of insulin (H)      senna-docusate (SENOKOT-S/PERICOLACE) 8.6-50 MG tablet Take 1-2 tablets by mouth 2 times daily as needed for constipation  Qty: 30 tablet, Refills: 0    Associated Diagnoses: Atherosclerosis of native coronary artery without angina pectoris, unspecified whether native or transplanted heart      !! Skin Protectants, Misc. (INTERDRY 10\"X36\") SHEE Externally apply 1 Box topically daily 1 sheet under breasts prn intertrigo  Qty: 1 each, Refills: 3    Associated Diagnoses: " "Intertrigo      !! Skin Protectants, Misc. (INTERDRY AG TEXTILE 10\"X144\") SHEE Externally apply 1 Box topically daily Apply to areas of intertrigo prn  Qty: 1 each, Refills: 3    Comments: Please refill this-- not previous rx  Associated Diagnoses: Intertrigo      traMADol (ULTRAM) 50 MG tablet Take 1 tablet (50 mg) by mouth every 6 hours as needed for breakthrough pain or severe pain  Qty: 30 tablet, Refills: 0    Associated Diagnoses: Other chronic pain      vitamin D3 (CHOLECALCIFEROL) 84333 units (250 mcg) capsule Take 1 capsule (10,000 Units) by mouth daily    Associated Diagnoses: (HFpEF) heart failure with preserved ejection fraction (H)       !! - Potential duplicate medications found. Please discuss with provider.        Allergies   Allergies   Allergen Reactions     Albuterol Other (See Comments)     Sandrita-oral erythema  Confirmed 7/3/18 that patient uses albuterol inhalers at home. Patient had her home inhaler in her bag.     Contrast Dye Anaphylaxis     Fish Allergy Anaphylaxis     Iodine Anaphylaxis     Oxycodone Other (See Comments)     Severe suicidal tendencies on this medication     Tree Nuts [Nuts] Anaphylaxis     Tree nuts only (peanuts ok)     Bactrim      Increased uric acid     Betadine [Povidone Iodine] Hives, Swelling and Difficulty breathing     Betadine     Combivent      Rash     Dulaglutide Other (See Comments)     Hx . Of thyroid cancer.     Fish      Lisinopril Other (See Comments)     Scr/grf severely reduced.      Penicillins Rash     Allopurinol Rash     Latex Rash     Wool Fiber Rash     "
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
## 2019-11-13 ENCOUNTER — PATIENT OUTREACH (OUTPATIENT)
Dept: CARE COORDINATION | Facility: CLINIC | Age: 74
End: 2019-11-13

## 2019-11-13 DIAGNOSIS — N63.0 BREAST MASS: Primary | ICD-10-CM

## 2019-11-13 DIAGNOSIS — L30.4 INTERTRIGO: ICD-10-CM

## 2019-11-13 ASSESSMENT — ACTIVITIES OF DAILY LIVING (ADL): DEPENDENT_IADLS:: INDEPENDENT

## 2019-11-13 NOTE — PROGRESS NOTES
Clinic Care Coordination Contact    Clinic Care Coordination Contact  OUTREACH    Referral Information:  Referral Source: IP Report    Primary Diagnosis: Respiratory Disorders - other    Chief Complaint   Patient presents with     Clinic Care Coordination - Post Hospital     RN        [place] Utilization:   Clinic Utilization  Difficulty keeping appointments:: yes  No-Show Concerns: yes  Utilization    Last refreshed: 11/13/2019  8:18 AM:  Hospital Admissions 2           Last refreshed: 11/13/2019  8:18 AM:  ED Visits 4           Last refreshed: 11/13/2019  8:18 AM:  No Show Count (past year) 9              Current as of: 11/13/2019  8:18 AM              Clinical Concerns:  Current Medical Concerns:  Admitted to hospital for hemoptysis (resolved), fungal skin infection, sinusitis. Diagnosed last week with breast cancer. Has appointment with surgeon next week  Current Behavioral Concerns: anxiety and depression. Currently undergoing medication titration    Education Provided to patient: reviewed signs/symptoms that prompt return to ED for evaluation      Health Maintenance Reviewed: Due/Overdue   Health Maintenance Due   Topic Date Due     ASTHMA ACTION PLAN  1945     ASTHMA CONTROL TEST  1945     COLONOSCOPY  05/27/1956     MEDICARE ANNUAL WELLNESS VISIT  08/10/2017     DIABETIC FOOT EXAM  03/19/2019     ZOSTER IMMUNIZATION (2 of 2) 11/20/2019     EYE EXAM  12/12/2019       Clinical Pathway: None    Medication Management:  Using anti-fungal powder to skin as directed. Taking antibiotic for sinusitis as directed.      Functional Status:  Dependent IADLs:: Independent  Bed or wheelchair confined:: No    Living Situation:  Lives with roommate     Diet/Exercise/Sleep:   no concerns    Transportation:     Transportation means:: Friend     Psychosocial:  Sabianist or spiritual beliefs that impact treatment:: No     Financial/Insurance:      Not discussed     Resources and Interventions:  Current Resources:     ;      Supplies used at home:: Nebulizer tubing       Advance Care Plan/Directive  Type Advanced Care Plans/Directives: Advanced Directive - On File  Advanced Care Plan/Directive Status: Not Applicable    Referrals Placed: None     Goals:   Goals        General    # 3 Medical (pt-stated)     Notes - Note edited  9/13/2019  2:28 PM by Polly Carvajal RN    1-Goal Statement:I will prevent a future fall   Measure of Success: No falls  Supportive Steps to Achieve:  I will use cane or walker at all times   I will use lifeline when I go to the bathroom because walker doesn't fit   Barriers:Lightheaded /Dizzy leg weakness   Strengths: outpatient PT/OT 3/6  Date to Achieve By: 1-1-20  Patient expressed understanding of goal: Yes        #1 Medical (pt-stated)     Notes - Note edited  9/13/2019  2:28 PM by Polly Carvajal RN    2-Goal Statement: I will monitor shortness of breath episodes   Measure of Success: More energy for daily activity   Supportive Steps to Achieve:   I will seek medical help if experiencing increased shortness of breath episodes   I will follow  COPD Action Plan   Barriers: None identified   Strengths: supportive room mate   Date to Achieve By 1-1-20  Patient expressed understanding of goal: Yes       Mental Health Management (pt-stated)     Notes - Note created  10/28/2019 11:03 AM by Polly Carvajal RN    Goal Statement: my depression symptoms will improve  Measure of Success: improvement in depression symptoms  Supportive Steps to Achieve: RN and RN CC support  Barriers: history of SI  Strengths: willing to comply  Date to Achieve By: 2-2-20  Patient expressed understanding of goal: yes             Lifestyle    Increase physical activity     Notes - Note edited  9/13/2019  2:29 PM by Polly Carvajal RN    3-Goal Statement: I will increase my physical activity  Measure of Success: increased strength, weight loss  Supportive Steps to Achieve: referred to [facility] for  safety  Barriers: history of falls; CHF  Strengths: wants to improve  Date to Achieve By: 1-1-20  Patient expressed understanding of goal: yes                  Patient/Caregiver understanding: good       Future Appointments              In 5 days [facility]    In 5 days Magdalena Hall PA-C [facility]    In 1 week Anjelica Gonzales MD [facility]    In 2 weeks Muriel Will PA-C [facility], [facility]    In 5 months [facility]    In 5 months Stephanie Wolf MD [facility]          Plan: keep appointments as scheduled. Take medications as directed. Sent staff message to PCP asking when she can see patient for hospital follow up appointment. Patient agrees with plan.     Vibha Carvajal RN  [facility]-Care Coordination  [facility]  [facility]  [phone]

## 2019-11-14 DIAGNOSIS — I50.32 CHRONIC DIASTOLIC HEART FAILURE (H): Primary | Chronic | ICD-10-CM

## 2019-11-18 LAB — INTERPRETATION ECG - MUSE: NORMAL

## 2019-11-21 ENCOUNTER — OFFICE VISIT (OUTPATIENT)
Dept: ONCOLOGY | Facility: CLINIC | Age: 74
End: 2019-11-21
Attending: SURGERY
Payer: MEDICARE

## 2019-11-21 ENCOUNTER — PRE VISIT (OUTPATIENT)
Dept: ONCOLOGY | Facility: CLINIC | Age: 74
End: 2019-11-21

## 2019-11-21 ENCOUNTER — PATIENT OUTREACH (OUTPATIENT)
Dept: CARDIOLOGY | Facility: CLINIC | Age: 74
End: 2019-11-21

## 2019-11-21 VITALS
OXYGEN SATURATION: 94 % | SYSTOLIC BLOOD PRESSURE: 130 MMHG | HEIGHT: 66 IN | TEMPERATURE: 97.9 F | DIASTOLIC BLOOD PRESSURE: 67 MMHG | WEIGHT: 293 LBS | BODY MASS INDEX: 47.09 KG/M2 | HEART RATE: 58 BPM | RESPIRATION RATE: 16 BRPM

## 2019-11-21 DIAGNOSIS — Z17.0 MALIGNANT NEOPLASM OF LOWER-INNER QUADRANT OF LEFT BREAST IN FEMALE, ESTROGEN RECEPTOR POSITIVE (H): ICD-10-CM

## 2019-11-21 DIAGNOSIS — C50.312 MALIGNANT NEOPLASM OF LOWER-INNER QUADRANT OF LEFT BREAST IN FEMALE, ESTROGEN RECEPTOR POSITIVE (H): ICD-10-CM

## 2019-11-21 PROCEDURE — G0463 HOSPITAL OUTPT CLINIC VISIT: HCPCS | Mod: ZF

## 2019-11-21 ASSESSMENT — MIFFLIN-ST. JEOR: SCORE: 2010

## 2019-11-21 ASSESSMENT — PAIN SCALES - GENERAL: PAINLEVEL: EXTREME PAIN (8)

## 2019-11-21 NOTE — TELEPHONE ENCOUNTER
"[facility] - CardioMEMS Reading Review      CardioMEMS reviewed and routed to patient's provider, Dr. Stephanie Wolf and SILVANO Gutierrez.   Current diuretic:  Bumex 2mg TID  Recent plan of care changes: recent diagnosis of Breast Cancer. Scheduled to see Yuni 12/3 to establish new C[facility] care.     Current Threshold parameters:         Today's Waveform:       Readings:           RHC Date Wedge Pressure MEMS PAD during cath  (average of the 3 values)     2019 w/MEMS implant     20 mmHg   18 mmHg           Serena Streeter RN BSN CHFN  Cardiology Care Coordinator - [facility]  Questions and [phone]  First press #1 for the University and then press #4 for \"Your Care Team\" to reach us Cardiology Nurses.                   "
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
## 2019-11-21 NOTE — NURSING NOTE
"Oncology Rooming Note    November 21, 2019 9:13 AM   Mariel Ribeiro is a 74 year old female who presents for:    Chief Complaint   Patient presents with     New Patient     NEW PT; DCIS LEFT BREAST; VITALS TAKEN      Initial Vitals: /67   Pulse 58   Temp 97.9  F (36.6  C) (Oral)   Resp 16   Ht 1.676 m (5' 6\")   Wt 148.8 kg (328 lb 1.6 oz)   SpO2 94%   BMI 52.96 kg/m   Estimated body mass index is 52.96 kg/m  as calculated from the following:    Height as of this encounter: 1.676 m (5' 6\").    Weight as of this encounter: 148.8 kg (328 lb 1.6 oz). Body surface area is 2.63 meters squared.  Extreme Pain (8) Comment: Data Unavailable   No LMP recorded. Patient is postmenopausal.  Allergies reviewed: Yes  Medications reviewed: Yes    Medications: Medication refills not needed today.  Pharmacy name entered into [facility]:    [id number] - [place], [place] - [address] AT [facility] [address]  [id number] - [place], [place] - [address] AT [facility] Fetch MD    Clinical concerns: No new concerns today  Dr Gonzales was notified.      Judie Vergara              "
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
## 2019-11-26 DIAGNOSIS — I50.32 CHRONIC DIASTOLIC HEART FAILURE (H): Primary | ICD-10-CM

## 2019-11-27 ENCOUNTER — TELEPHONE (OUTPATIENT)
Dept: ONCOLOGY | Facility: CLINIC | Age: 74
End: 2019-11-27

## 2019-11-27 NOTE — TELEPHONE ENCOUNTER
I called Mariel to schedule an appt with a breast oncologist as requested via inbasket from Odalis RAMIREZ. Mariel stated that she does not need to see a breast oncologist as she was told to return in 6 months for a follow-up appt only. Inbasket sent to Odalis RAMIREZ to advise on how to proceed.

## 2019-11-29 ENCOUNTER — PATIENT OUTREACH (OUTPATIENT)
Dept: CARE COORDINATION | Facility: CLINIC | Age: 74
End: 2019-11-29

## 2019-11-29 DIAGNOSIS — N63.0 BREAST MASS: Primary | ICD-10-CM

## 2019-11-29 DIAGNOSIS — I50.32 CHRONIC DIASTOLIC HEART FAILURE (H): ICD-10-CM

## 2019-11-29 NOTE — PROGRESS NOTES
Clinic Care Coordination Contact    Follow Up Progress Note      Assessment: pro-active outreach call placed to patient. Has appointments next week to establish care at [facility] and endocrinology. Patient with continued complaint of shortness of breath. Didn't know to weigh herself daily    Goals addressed this encounter:   Goals Addressed                 This Visit's Progress      #1 Medical (pt-stated)   On track     2-Goal Statement: I will monitor shortness of breath episodes   Measure of Success: More energy for daily activity   Supportive Steps to Achieve:   I will seek medical help if experiencing increased shortness of breath episodes   I will follow  COPD Action Plan   Barriers: None identified   Strengths: supportive room mate   Date to Achieve By 1-1-20  Patient expressed understanding of goal: Yes         Mental Health Management (pt-stated)        5-Goal Statement: my depression symptoms will improve  Measure of Success: improvement in depression symptoms  Supportive Steps to Achieve: RN and RN CC support  Barriers: history of SI  Strengths: willing to comply  Date to Achieve By: 2-2-20  Patient expressed understanding of goal: yes            Monitoring (pt-stated)        4-Goal Statement: I will weigh myself every day and will call my clinic with a daily weight gain over 2 lbs or 5 lbs in a week  Measure of Success: weighs self daily  Supportive Steps to Achieve: RN CC support  Barriers: none  Strengths: willing to comply  Date to Achieve By: 2-1-20  Patient expressed understanding of goal: yes                 Intervention/Education provided during outreach: educated to weigh self daily and call clinic with gain of more than 2 lbs in one day or 5 lbs in one week.           Plan:   Follow up in approximately one month    Vibha Carvajal RN  [facility]-Care Coordination  [facility]  [facility]  [phone]

## 2019-12-03 ENCOUNTER — OFFICE VISIT (OUTPATIENT)
Dept: CARDIOLOGY | Facility: CLINIC | Age: 74
End: 2019-12-03
Attending: INTERNAL MEDICINE
Payer: MEDICARE

## 2019-12-03 ENCOUNTER — OFFICE VISIT (OUTPATIENT)
Dept: ENDOCRINOLOGY | Facility: CLINIC | Age: 74
End: 2019-12-03
Payer: MEDICARE

## 2019-12-03 VITALS
SYSTOLIC BLOOD PRESSURE: 103 MMHG | BODY MASS INDEX: 47.09 KG/M2 | DIASTOLIC BLOOD PRESSURE: 47 MMHG | HEIGHT: 66 IN | HEART RATE: 64 BPM | WEIGHT: 293 LBS

## 2019-12-03 DIAGNOSIS — I50.32 CHRONIC DIASTOLIC HEART FAILURE (H): ICD-10-CM

## 2019-12-03 DIAGNOSIS — I50.32 CHRONIC DIASTOLIC HEART FAILURE (H): Chronic | ICD-10-CM

## 2019-12-03 DIAGNOSIS — R05.9 COUGH: ICD-10-CM

## 2019-12-03 DIAGNOSIS — N18.30 TYPE 2 DIABETES MELLITUS WITH STAGE 3 CHRONIC KIDNEY DISEASE, WITH LONG-TERM CURRENT USE OF INSULIN (H): ICD-10-CM

## 2019-12-03 DIAGNOSIS — F32.A DEPRESSION, UNSPECIFIED DEPRESSION TYPE: ICD-10-CM

## 2019-12-03 DIAGNOSIS — Z79.4 TYPE 2 DIABETES MELLITUS WITH STAGE 3 CHRONIC KIDNEY DISEASE, WITH LONG-TERM CURRENT USE OF INSULIN (H): ICD-10-CM

## 2019-12-03 DIAGNOSIS — I25.10 CORONARY ARTERY DISEASE INVOLVING NATIVE CORONARY ARTERY OF NATIVE HEART WITHOUT ANGINA PECTORIS: Primary | ICD-10-CM

## 2019-12-03 DIAGNOSIS — N18.4 CKD (CHRONIC KIDNEY DISEASE) STAGE 4, GFR 15-29 ML/MIN (H): ICD-10-CM

## 2019-12-03 DIAGNOSIS — E11.22 TYPE 2 DIABETES MELLITUS WITH STAGE 3 CHRONIC KIDNEY DISEASE, WITH LONG-TERM CURRENT USE OF INSULIN (H): ICD-10-CM

## 2019-12-03 LAB
ANION GAP SERPL CALCULATED.3IONS-SCNC: 4 MMOL/L (ref 3–14)
BUN SERPL-MCNC: 43 MG/DL (ref 7–30)
CALCIUM SERPL-MCNC: 9.8 MG/DL (ref 8.5–10.1)
CHLORIDE SERPL-SCNC: 106 MMOL/L (ref 94–109)
CO2 SERPL-SCNC: 29 MMOL/L (ref 20–32)
CREAT SERPL-MCNC: 1.8 MG/DL (ref 0.52–1.04)
GFR SERPL CREATININE-BSD FRML MDRD: 27 ML/MIN/{1.73_M2}
GLUCOSE SERPL-MCNC: 117 MG/DL (ref 70–99)
POTASSIUM SERPL-SCNC: 4.4 MMOL/L (ref 3.4–5.3)
SODIUM SERPL-SCNC: 138 MMOL/L (ref 133–144)

## 2019-12-03 PROCEDURE — 80048 BASIC METABOLIC PNL TOTAL CA: CPT | Performed by: PHYSICIAN ASSISTANT

## 2019-12-03 PROCEDURE — 36415 COLL VENOUS BLD VENIPUNCTURE: CPT | Performed by: PHYSICIAN ASSISTANT

## 2019-12-03 PROCEDURE — 99214 OFFICE O/P EST MOD 30 MIN: CPT | Mod: ZP | Performed by: PHYSICIAN ASSISTANT

## 2019-12-03 RX ORDER — METOPROLOL SUCCINATE 25 MG/1
TABLET, EXTENDED RELEASE ORAL
Qty: 120 TABLET | Refills: 11 | Status: SHIPPED | OUTPATIENT
Start: 2019-12-03 | End: 2021-02-17

## 2019-12-03 RX ORDER — REPAGLINIDE 1 MG/1
1 TABLET ORAL
Qty: 90 TABLET | Refills: 3 | Status: SHIPPED | OUTPATIENT
Start: 2019-12-03 | End: 2021-02-18

## 2019-12-03 ASSESSMENT — MIFFLIN-ST. JEOR: SCORE: 1991.4

## 2019-12-03 ASSESSMENT — PAIN SCALES - GENERAL: PAINLEVEL: NO PAIN (0)

## 2019-12-03 NOTE — PATIENT INSTRUCTIONS
It is good to see you Mariel!    Please increase Lantus to 31 units every morning.  If your morning blood sugars are still often >200 inone week, please increase to 33 units daily.    Please continue to take Prandin before each meal.  When you refill this, the dose will be higher and you will be taking 1 mg before each meal (instead of 0.5mg each meal.)  If you are able to take 2 of your 0.5 mg tablets with each meal, that would be ideal.    Please call if blood sugar >350 twice daily or with any blood sugars  <70.    Please ask [facility] team and your primary care provider if they think you should see kidney doctor.    My best wishes,    Muriel Will PA-C, MPAS  [facility]  Diabetes, Endocrinology, and Metabolism  [phone] Appointments/Nurse  [phone] Fax  [phone] nurse line  [phone] URGENTafter hours/weekend Endocrinologist on call
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
## 2019-12-03 NOTE — LETTER
12/3/2019       RE: Mariel Ribeiro  [address] [place] [address]     Dear Colleague,    Thank you for referring your patient, Mariel Ribeiro, to the [facility] ENDOCRINOLOGY at [facility]. Please see a copy of my visit note below.    [facility]  Endocrinology  Muriel Will PA-C  12/03/2019      Chief Complaint:   Diabetes    History of Present Illness:   Mariel Ribeiro is a 74 year old female with a history of type II diabetes mellitus, kidney failure, TIA, CHF, CAD status post CABG, COPD, thyroid cancer status post thyroidectomy and resultant hypothyroidism, and hypertension who presents for a hospital follow up. She was diagnosed with type 2 diabetes mellitus in 2007. Initially managed with oral Metformin, Januvia, and Glimepiride, all have been discontinued due to declining renal fucntion. She was reasonably well managed with NPH 28 units bid and Prandin with meals when last seen 10/31/2019.    Recently, she was seen in the ED on 11/11/2019 for shortness of breath and hemoptysis. She was treated for this and monitored over night in the hospital. They gave her Doxycycline 100 mg twice daily for seven days to cover empiric course of sinusitis.     Last visit, we discussed increasing her insulin to 30 units, but she has not done this. She does not eat at night and will eat dinner at 8 pm. She goes to bed around midnight. She is awake by around 6 am. She does not get much sleep but sometimes will take a nap during the day. She does not think Prandin is helpful, which she takes at each meal as she feels her blood sugars are still mostly too high.    She has declining renal function with a GFR of 27. She does not see a nephrologist. She reports she does have burning with urination in the past. She continues to be active in [facility] clinic and fluids appear to be more stable.      Blood Glucose Monitoring:  We reviewed glucometer and CGM data together.  Average glucose:  217 mg/dl  SD glucose: 50 mg/dl  Highest glucose value: 334 mg/dl  Lowest glucose value: 107 mg/dl    Diabetes monitoring and complications:  CAD: Yes  Last eye exam results: mild diabetic retinopathy (12/12/2018)   Microalbuminuria: 31.53 (5/22/2019)  Neuropathy: Yes  HTN: Yes  On Statin: Yes  On Aspirin: Yes  Depression: Yes  Erectile dysfunction: N/A      Review of Systems:   Pertinent items are noted in HPI.  All other systems are negative.    Active Medications:     Current Outpatient Medications:      insulin glargine (LANTUS SOLOSTAR) 100 UNIT/ML pen, Inject  31 units  daily subcutaneously.  call  if blood sugar <70, often >200., Disp: 15 mL, Rfl: 3     repaglinide (PRANDIN) 1 MG tablet, Take 1 tablet (1 mg) by mouth 3 times daily (before meals), Disp: 90 tablet, Rfl: 3     acetaminophen (TYLENOL) 325 MG tablet, Take 650 mg by mouth every 4 hours as needed for mild pain Take 2 tablets by mouth every 4 hours as needed for mild pain, Disp: 100 tablet, Rfl:      albuterol (PROAIR HFA) 108 (90 Base) MCG/ACT inhaler, Inhale 1-2 puffs into the lungs every 4 hours as needed for wheezing, Disp: 8.5 g, Rfl:      aspirin (ASA) 81 MG EC tablet, Take 1 tablet (81 mg) by mouth daily, Disp: , Rfl:      atorvastatin (LIPITOR) 40 MG tablet, TAKE 1 TABLET(40 MG) BY MOUTH DAILY, Disp: 90 tablet, Rfl: 1     blood glucose monitoring (NO BRAND SPECIFIED) meter device kit, Use to test blood sugar four times daily or as directed., Disp: 1 kit, Rfl: 0     bumetanide (BUMEX) 1 MG tablet, Take 2 tablets (2 mg) by mouth 3 times daily Or as directed by [facility], Disp: 360 tablet, Rfl: 3     buPROPion (WELLBUTRIN XL) 150 MG 24 hr tablet, Take 1 tablet (150 mg) by mouth every morning, Disp: 30 tablet, Rfl: 3     capsaicin (ZOSTRIX) 0.025 % external cream, Apply 1 g topically 3 times daily For neuropathic pain., Disp: 60 g, Rfl: 1     Continuous Blood Gluc  (Drone.ioYLE MARTY 14 DAY READER) JEZ, 1 Units 4 times daily (before meals and  nightly), Disp: 1 Device, Rfl: 0     Continuous Blood Gluc Sensor (FREESTYLE MARTY 14 DAY SENSOR) [facility], 1 Units 4 times daily (before meals and nightly), Disp: 6 each, Rfl: 3     doxycycline hyclate (VIBRA-TABS) 100 MG tablet, Take 1 tablet (100 mg) by mouth 2 times daily, Disp: 14 tablet, Rfl: 0     EPINEPHrine (EPIPEN/ADRENACLICK/OR ANY BX GENERIC EQUIV) 0.3 MG/0.3ML injection 2-pack, Inject 0.3 mLs (0.3 mg) into the muscle once as needed for anaphylaxis, Disp: 0.6 mL, Rfl: 0     escitalopram (LEXAPRO) 20 MG tablet, Take 1 tablet (20 mg) by mouth every morning, Disp: 90 tablet, Rfl: 1     ferrous gluconate (FERGON) 324 (38 Fe) MG tablet, Take 1 tablet (324 mg) by mouth Every Mon, Wed, Fri Morning, Disp: 36 tablet, Rfl: 3     folic acid (FOLVITE) 1 MG tablet, Take 2 tablets (2 mg) by mouth daily, Disp: , Rfl:      guaiFENesin (MUCINEX) 600 MG 12 hr tablet, Take 1 tablet (600 mg) by mouth 2 times daily, Disp: , Rfl:      levothyroxine (SYNTHROID) 150 MCG tablet, Take 1 tablet (150 mcg) by mouth daily, Disp: 90 tablet, Rfl: 3     Independent Stock Market[employer] FINEPOINT LANCETS MISC, Use to test blood sugars 2 times daily or as directed., Disp: 100 each, Rfl: prn     losartan (COZAAR) 50 MG tablet, Take 1 tablet (50 mg) by mouth 2 times daily, Disp: 135 tablet, Rfl: 3     metoprolol tartrate (LOPRESSOR) 50 MG tablet, Take 1 tablet (50 mg) by mouth 2 times daily, Disp: 180 tablet, Rfl: 3     miconazole (MICATIN/MICRO GUARD) 2 % external powder, Apply topically as needed for itching or other Redness in bilateral groin and  clef, Disp: 43 g, Rfl: 0     niacin ER (NIASPAN) 500 MG CR tablet, TAKE 1 TABLET(500 MG) BY MOUTH TWICE DAILY, Disp: 180 tablet, Rfl: 2     nitroGLYcerin (NITROSTAT) 0.4 MG sublingual tablet, For chest pain place 1 tablet under the tongue every 5 minutes for 3 doses. If symptoms persist 5 minutes after 1st dose call 911., Disp: 25 tablet, Rfl: 0     ONE TOUCH ULTRA (DEVICES) MISC, test blood sugar BID, Disp: 1, Rfl:  "0     ONETOUCH ULTRA test strip, USE FOUR TIMES DAILY, Disp: 400 strip, Rfl: 1     potassium chloride ER (K-DUR/KLOR-CON M) 10 MEQ CR tablet, Take 2 tablets (20 mEq) by mouth 3 times daily, Disp: 120 tablet, Rfl: 3     pregabalin (LYRICA) 100 MG capsule, Take 1 capsule (100 mg) by mouth 2 times daily, Disp: 180 capsule, Rfl: 3     senna-docusate (SENOKOT-S/PERICOLACE) 8.6-50 MG tablet, Take 1-2 tablets by mouth 2 times daily as needed for constipation, Disp: 30 tablet, Rfl: 0     Skin Protectants, Misc. (INTERDRY 10\"X36\") SHEE, Externally apply 1 Box topically daily 1 sheet under breasts prn intertrigo, Disp: 1 each, Rfl: 3     Skin Protectants, Misc. (INTERDRY AG TEXTILE 10\"X144\") SHEE, Externally apply 1 Box topically daily Apply to areas of intertrigo prn, Disp: 1 each, Rfl: 3     traMADol (ULTRAM) 50 MG tablet, Take 1 tablet (50 mg) by mouth every 6 hours as needed for breakthrough pain or severe pain, Disp: 30 tablet, Rfl: 0     traZODone (DESYREL) 50 MG tablet, TAKE 3 TABLETS(150 MG) BY MOUTH AT BEDTIME, Disp: 270 tablet, Rfl: 1     vitamin D3 (CHOLECALCIFEROL) 96002 units (250 mcg) capsule, Take 1 capsule (10,000 Units) by mouth daily, Disp: , Rfl:   No current facility-administered medications for this visit.     Facility-Administered Medications Ordered in Other Visits:      sodium chloride (PF) 0.9% PF flush 10 mL, 10 mL, Intracatheter, Once, Starla Saez NP      Allergies:   Albuterol; Contrast dye; Fish allergy; Iodine; Oxycodone; Tree nuts [nuts]; Bactrim; Betadine [povidone iodine]; Combivent; Dulaglutide; Fish; Lisinopril; Penicillins; Allopurinol; Latex; and Wool fiber      Past Medical History:  Anxiety  COPD  Coronary atherosclerosis  Depression  GERD  Gout  Thyroid cancer  Insomnia  Lymphedema  Migraines  Mononeuritis  Obstructive sleep apnea  Hyperlipidemia  Kidney failure  Type 2 diabetes mellitus  Umbilical hernia  Hypertension  Hypothyroidism  Vitamin D " "deficiency  Hyperlipidemia  Diastolic CHF  Osteoarthritis  TIA  Spinal stenosis: Lumbar  Carotid artery disease     Past Surgical History:  CABG x3  Coronary stent placement x2  Knee arthroplasty: Bilateral  Cataract removal: Bilateral  Colonoscopy  Heart catheterization x2  Dilation and curettage  Knee arthroscopy: Bilateral  Laparoscopic cholecystectomy  Denture implant  Thyroidectomy  Bilateral thumb surgery: Procedure unlisted  Right breast biopsy  Rotator cuff repair: Left    Family History:   CAD - mother, father  Diabetes - mother, sister  Hypertension - mother, father, sister, brother  Clotting disorder - mother  DVT - mother  CVA - mother  Glaucoma - mother  Macular degeneration - mother  Cerebrovascular disease - father  Alcohol abuse - father, sister  Liver cancer - brother  Pancreatic cancer - brother  Brain cancer - brother  Heart disease - sister  Drug abuse - brother  CAD - brother x2, sister x2  Kidney disease - sister     Social History:   She lives with her roommate Odalis and her dog Rimma.   Tobacco Use: former smoker, 27 years, quit 1989  Alcohol Use: none  Drug Use: none  PCP: Amee Connell      Physical Exam:   /47   Pulse 64   Ht 1.676 m (5' 6\")   Wt 147 kg (324 lb)   BMI 52.29 kg/m        Wt Readings from Last 10 Encounters:   12/03/19 147 kg (324 lb)   11/21/19 148.8 kg (328 lb 1.6 oz)   11/12/19 147 kg (324 lb)   10/31/19 (!) 150.6 kg (332 lb)   10/30/19 (!) 150.9 kg (332 lb 9.6 oz)   10/15/19 (!) 151 kg (333 lb)   10/01/19 146.5 kg (323 lb)   09/10/19 (!) 151.5 kg (334 lb)   09/05/19 149.7 kg (330 lb)   07/31/19 145.6 kg (321 lb)      General: Pleasant, well nourished and hydrated female in NAD.   Psych:  Mood is \"good,\" affect is appropriate.  Thought form and content are fluid and coherent.    HEENT: Eyes and sclera are clear. Extraocular movements are grossly intact without proptosis.  Nares are patent, mucous membranes moist.  Neck: No masses or JVD are noted.    Resp: " Easy and unlabored breathing.   Neuro: Alert and oriented, communicating clearly.   Ext: no swelling or edema     Data:  Lab Results   Component Value Date     12/03/2019    POTASSIUM 4.4 12/03/2019    CHLORIDE 106 12/03/2019    CO2 29 12/03/2019    ANIONGAP 4 12/03/2019     (H) 12/03/2019    BUN 43 (H) 12/03/2019    CR 1.80 (H) 12/03/2019    ANTOINETTE 9.8 12/03/2019     Lab Results   Component Value Date    GFRESTIMATED 27 (L) 12/03/2019    GFRESTIMATED 34 (L) 11/11/2019    GFRESTIMATED 30 (L) 11/06/2019    GFRESTBLACK 32 (L) 12/03/2019    GFRESTBLACK 40 (L) 11/11/2019    GFRESTBLACK 34 (L) 11/06/2019      Lab Results   Component Value Date    MICROL 31 05/22/2019    UMALCR 31.53 (H) 05/22/2019        Lab Results   Component Value Date    A1C 6.5 (H) 04/30/2019    A1C 5.2 09/10/2018    A1C 9.3 (H) 07/01/2018    A1C 8.2 (H) 03/19/2018    A1C 7.3 (H) 08/09/2017    HEMOGLOBINA1 7.2 (A) 10/31/2019    HEMOGLOBINA1 6.5 (A) 04/03/2019    HEMOGLOBINA1 5.5 11/29/2018     No results found for: CPEPT, GADAB, ISCAB    Lab Results   Component Value Date    CHOL 170 03/13/2019    CHOL 203 (H) 03/19/2018    TRIG 118 03/13/2019    TRIG 212 (H) 03/19/2018    HDL 71 03/13/2019    HDL 50 03/19/2018    LDL 76 03/13/2019     (H) 03/19/2018    NHDL 99 03/13/2019    NHDL 153 (H) 03/19/2018       Assessment and Plan:  Type 2 diabetes mellitus with stage 3 chronic kidney disease, with long-term current use of insulin (H)    Mariel is a 74 year old female with type II diabetes that is currently not well controlled and blood sugars are well above goal in the last month, possibly related to decreased activity. Genearlly above 200 fasting but some are coming down during the day, so Prandin is working. I increased her Lantus to 31 units every morning.  If your morning blood sugars are still often >200 in one week, please increase to 33 units daily. I told her to continue taking Prandin with each meal. When she refills the Prandin  the dose will be higher and she will take 1 mg before each meal. If she blood sugars are higher than 350 twice in one day, then she should call. I also discussed seeing nephrology with her cardiology provider, Magdalena Hall, who agreed appropriate.  We will see her back in 4  weeks to see if therapy needs to be augmented and to monitor for any low BG. Therapy is limited by compromised liver function and cognition.   - repaglinide (PRANDIN) 1 MG tablet  Dispense: 90 tablet; Refill: 3  - insulin glargine (LANTUS SOLOSTAR) 100 UNIT/ML pen  Dispense: 15 mL; Refill: 3  - NEPHROLOGY ADULT REFERRAL        Follow-up: Return in 3-4 weeks.     >50% of 30 minute visit spent in face to face counseling, education and coordination of care related to options for better glycemic control as well as preventing, detecting, and treating hypoglycemia.      It is my privilege to be involved in the care of the above patient.     Muriel Will PA-C, [facility]  [facility]  Diabetes, Endocrinology, and Metabolism  [phone] Appointments/Nurse  [phone] Fax  [phone] pager  [phone] nurse line      Scribe Disclosure:  I, Puja Martinez, am serving as a scribe to document services personally performed by Muriel Will PA-C at this visit, based upon the provider's statements to me. All documentation has been reviewed by the aforementioned provider prior to being entered into the official medical record.     Portions of this medical record were completed by a scribe. UPON MY REVIEW AND AUTHENTICATION BY ELECTRONIC SIGNATURE, this confirms (a) I performed the applicable clinical services, and (b) the record is accurate.

## 2019-12-03 NOTE — PATIENT INSTRUCTIONS
Take your medicines every day, as directed    Changes made today:  o You have heart failure with preserved ejection fraction as well as now a reduced ejection fraction   o We are going to convert you to long acting metoprolol  o Please start taking Metoprolol SUCCINATE 25 mg in the morning and 50 mg at night  o Get rid of your metoprolol tartrate     Monitor Your Weight and Symptoms    Contact us if you:      Gain 2 pounds in one day or 5 pounds in one week    Feel more short of breath    Notice more leg swelling    Feel lightheadeded   Change your lifestyle    Limit Salt or Sodium:    2000 mg  Limit Fluids:    2000 mL or approximately 64 ounces  Eat a Heart Healthy Diet    Low in saturated fats  Stay Active:    Aim to move at least 150 minutes every  week         To Contact us    During Business Hours:  [phone], option # 1 (University)  Then option # 4 (medical questions)     After hours, weekends or holidays:   [phone], Option #4  Ask to speak to the On-Call Cardiologist. Inform them you are a [facility]/heart failure patient at the [facility].     Use [employer] allows you to communicate directly with your heart team through secure messaging.    [employer] can be accessed any time on your phone, computer, or tablet.    If you need assistance, we'd be happy to help!         Keep your Heart Appointments:    - Please call your PCP for an appointment about your cough  - Please follow-up with a nephrologist as recommend   - Please follow-up with [facility] in 2 months

## 2019-12-03 NOTE — PROGRESS NOTES
[facility]  Endocrinology  Muriel Will PA-C  12/03/2019      Chief Complaint:   Diabetes    History of Present Illness:   Mariel Ribeiro is a 74 year old female with a history of type II diabetes mellitus, kidney failure, TIA, CHF, CAD status post CABG, COPD, thyroid cancer status post thyroidectomy and resultant hypothyroidism, and hypertension who presents for a hospital follow up. She was diagnosed with type 2 diabetes mellitus in 2007. Initially managed with oral Metformin, Januvia, and Glimepiride, all have been discontinued due to declining renal fucntion. She was reasonably well managed with NPH 28 units bid and Prandin with meals when last seen 10/31/2019.    Recently, she was seen in the ED on 11/11/2019 for shortness of breath and hemoptysis. She was treated for this and monitored over night in the hospital. They gave her Doxycycline 100 mg twice daily for seven days to cover empiric course of sinusitis.     Last visit, we discussed increasing her insulin to 30 units, but she has not done this. She does not eat at night and will eat dinner at 8 pm. She goes to bed around midnight. She is awake by around 6 am. She does not get much sleep but sometimes will take a nap during the day. She does not think Prandin is helpful, which she takes at each meal as she feels her blood sugars are still mostly too high.    She has declining renal function with a GFR of 27. She does not see a nephrologist. She reports she does have burning with urination in the past. She continues to be active in [facility] and fluids appear to be more stable.      Blood Glucose Monitoring:  We reviewed glucometer and CGM data together.  Average glucose: 217 mg/dl  SD glucose: 50 mg/dl  Highest glucose value: 334 mg/dl  Lowest glucose value: 107 mg/dl    Diabetes monitoring and complications:  CAD: Yes  Last eye exam results: mild diabetic retinopathy (12/12/2018)   Microalbuminuria: 31.53 (5/22/2019)  Neuropathy: Yes  HTN: Yes  On  Statin: Yes  On Aspirin: Yes  Depression: Yes  Erectile dysfunction: N/A      Review of Systems:   Pertinent items are noted in HPI.  All other systems are negative.    Active Medications:     Current Outpatient Medications:      insulin glargine (LANTUS SOLOSTAR) 100 UNIT/ML pen, Inject  31 units  daily subcutaneously.  call  if blood sugar <70, often >200., Disp: 15 mL, Rfl: 3     repaglinide (PRANDIN) 1 MG tablet, Take 1 tablet (1 mg) by mouth 3 times daily (before meals), Disp: 90 tablet, Rfl: 3     acetaminophen (TYLENOL) 325 MG tablet, Take 650 mg by mouth every 4 hours as needed for mild pain Take 2 tablets by mouth every 4 hours as needed for mild pain, Disp: 100 tablet, Rfl:      albuterol (PROAIR HFA) 108 (90 Base) MCG/ACT inhaler, Inhale 1-2 puffs into the lungs every 4 hours as needed for wheezing, Disp: 8.5 g, Rfl:      aspirin (ASA) 81 MG EC tablet, Take 1 tablet (81 mg) by mouth daily, Disp: , Rfl:      atorvastatin (LIPITOR) 40 MG tablet, TAKE 1 TABLET(40 MG) BY MOUTH DAILY, Disp: 90 tablet, Rfl: 1     blood glucose monitoring (NO BRAND SPECIFIED) meter device kit, Use to test blood sugar four times daily or as directed., Disp: 1 kit, Rfl: 0     bumetanide (BUMEX) 1 MG tablet, Take 2 tablets (2 mg) by mouth 3 times daily Or as directed by [facility], Disp: 360 tablet, Rfl: 3     buPROPion (WELLBUTRIN XL) 150 MG 24 hr tablet, Take 1 tablet (150 mg) by mouth every morning, Disp: 30 tablet, Rfl: 3     capsaicin (ZOSTRIX) 0.025 % external cream, Apply 1 g topically 3 times daily For neuropathic pain., Disp: 60 g, Rfl: 1     Continuous Blood Gluc  (FREESTYLE MARTY 14 DAY READER) JEZ, 1 Units 4 times daily (before meals and nightly), Disp: 1 Device, Rfl: 0     Continuous Blood Gluc Sensor (FREESTYLE MARTY 14 DAY SENSOR) MISC, 1 Units 4 times daily (before meals and nightly), Disp: 6 each, Rfl: 3     doxycycline hyclate (VIBRA-TABS) 100 MG tablet, Take 1 tablet (100 mg) by mouth 2 times daily, Disp: 14  tablet, Rfl: 0     EPINEPHrine (EPIPEN/ADRENACLICK/OR ANY BX GENERIC EQUIV) 0.3 MG/0.3ML injection 2-pack, Inject 0.3 mLs (0.3 mg) into the muscle once as needed for anaphylaxis, Disp: 0.6 mL, Rfl: 0     escitalopram (LEXAPRO) 20 MG tablet, Take 1 tablet (20 mg) by mouth every morning, Disp: 90 tablet, Rfl: 1     ferrous gluconate (FERGON) 324 (38 Fe) MG tablet, Take 1 tablet (324 mg) by mouth Every Mon, Wed, Fri Morning, Disp: 36 tablet, Rfl: 3     folic acid (FOLVITE) 1 MG tablet, Take 2 tablets (2 mg) by mouth daily, Disp: , Rfl:      guaiFENesin (MUCINEX) 600 MG 12 hr tablet, Take 1 tablet (600 mg) by mouth 2 times daily, Disp: , Rfl:      levothyroxine (SYNTHROID) 150 MCG tablet, Take 1 tablet (150 mcg) by mouth daily, Disp: 90 tablet, Rfl: 3     App [place] [employer] FINEPOINT LANCETS MISC, Use to test blood sugars 2 times daily or as directed., Disp: 100 each, Rfl: prn     losartan (COZAAR) 50 MG tablet, Take 1 tablet (50 mg) by mouth 2 times daily, Disp: 135 tablet, Rfl: 3     metoprolol tartrate (LOPRESSOR) 50 MG tablet, Take 1 tablet (50 mg) by mouth 2 times daily, Disp: 180 tablet, Rfl: 3     miconazole (MICATIN/MICRO GUARD) 2 % external powder, Apply topically as needed for itching or other Redness in bilateral groin and  clef, Disp: 43 g, Rfl: 0     niacin ER (NIASPAN) 500 MG CR tablet, TAKE 1 TABLET(500 MG) BY MOUTH TWICE DAILY, Disp: 180 tablet, Rfl: 2     nitroGLYcerin (NITROSTAT) 0.4 MG sublingual tablet, For chest pain place 1 tablet under the tongue every 5 minutes for 3 doses. If symptoms persist 5 minutes after 1st dose call 911., Disp: 25 tablet, Rfl: 0     ONE TOUCH ULTRA (DEVICES) MISC, test blood sugar BID, Disp: 1, Rfl: 0     ONETOUCH ULTRA test strip, USE FOUR TIMES DAILY, Disp: 400 strip, Rfl: 1     potassium chloride ER (K-DUR/KLOR-CON M) 10 MEQ CR tablet, Take 2 tablets (20 mEq) by mouth 3 times daily, Disp: 120 tablet, Rfl: 3     pregabalin (LYRICA) 100 MG capsule, Take 1 capsule (100 mg) by  "mouth 2 times daily, Disp: 180 capsule, Rfl: 3     senna-docusate (SENOKOT-S/PERICOLACE) 8.6-50 MG tablet, Take 1-2 tablets by mouth 2 times daily as needed for constipation, Disp: 30 tablet, Rfl: 0     Skin Protectants, Misc. (INTERDRY 10\"X36\") SHEE, Externally apply 1 Box topically daily 1 sheet under breasts prn intertrigo, Disp: 1 each, Rfl: 3     Skin Protectants, Misc. (INTERDRY AG TEXTILE 10\"X144\") SHEE, Externally apply 1 Box topically daily Apply to areas of intertrigo prn, Disp: 1 each, Rfl: 3     traMADol (ULTRAM) 50 MG tablet, Take 1 tablet (50 mg) by mouth every 6 hours as needed for breakthrough pain or severe pain, Disp: 30 tablet, Rfl: 0     traZODone (DESYREL) 50 MG tablet, TAKE 3 TABLETS(150 MG) BY MOUTH AT BEDTIME, Disp: 270 tablet, Rfl: 1     vitamin D3 (CHOLECALCIFEROL) 13726 units (250 mcg) capsule, Take 1 capsule (10,000 Units) by mouth daily, Disp: , Rfl:   No current facility-administered medications for this visit.     Facility-Administered Medications Ordered in Other Visits:      sodium chloride (PF) 0.9% PF flush 10 mL, 10 mL, Intracatheter, Once, Starla Saez, NP      Allergies:   Albuterol; Contrast dye; Fish allergy; Iodine; Oxycodone; Tree nuts [nuts]; Bactrim; Betadine [povidone iodine]; Combivent; Dulaglutide; Fish; Lisinopril; Penicillins; Allopurinol; Latex; and Wool fiber      Past Medical History:  Anxiety  COPD  Coronary atherosclerosis  Depression  GERD  Gout  Thyroid cancer  Insomnia  Lymphedema  Migraines  Mononeuritis  Obstructive sleep apnea  Hyperlipidemia  Kidney failure  Type 2 diabetes mellitus  Umbilical hernia  Hypertension  Hypothyroidism  Vitamin D deficiency  Hyperlipidemia  Diastolic CHF  Osteoarthritis  TIA  Spinal stenosis: Lumbar  Carotid artery disease     Past Surgical History:  CABG x3  Coronary stent placement x2  Knee arthroplasty: Bilateral  Cataract removal: Bilateral  Colonoscopy  Heart catheterization x2  Dilation and curettage  Knee " "arthroscopy: Bilateral  Laparoscopic cholecystectomy  Denture implant  Thyroidectomy  Bilateral thumb surgery: Procedure unlisted  Right breast biopsy  Rotator cuff repair: Left    Family History:   CAD - mother, father  Diabetes - mother, sister  Hypertension - mother, father, sister, brother  Clotting disorder - mother  DVT - mother  CVA - mother  Glaucoma - mother  Macular degeneration - mother  Cerebrovascular disease - father  Alcohol abuse - father, sister  Liver cancer - brother  Pancreatic cancer - brother  Brain cancer - brother  Heart disease - sister  Drug abuse - brother  CAD - brother x2, sister x2  Kidney disease - sister     Social History:   She lives with her roommate Odalis and her dog Rimma.   Tobacco Use: former smoker, 27 years, quit 1989  Alcohol Use: none  Drug Use: none  PCP: Amee Connell      Physical Exam:   /47   Pulse 64   Ht 1.676 m (5' 6\")   Wt 147 kg (324 lb)   BMI 52.29 kg/m       Wt Readings from Last 10 Encounters:   12/03/19 147 kg (324 lb)   11/21/19 148.8 kg (328 lb 1.6 oz)   11/12/19 147 kg (324 lb)   10/31/19 (!) 150.6 kg (332 lb)   10/30/19 (!) 150.9 kg (332 lb 9.6 oz)   10/15/19 (!) 151 kg (333 lb)   10/01/19 146.5 kg (323 lb)   09/10/19 (!) 151.5 kg (334 lb)   09/05/19 149.7 kg (330 lb)   07/31/19 145.6 kg (321 lb)      General: Pleasant, well nourished and hydrated female in NAD.   Psych:  Mood is \"good,\" affect is appropriate.  Thought form and content are fluid and coherent.    HEENT: Eyes and sclera are clear. Extraocular movements are grossly intact without proptosis.  Nares are patent, mucous membranes moist.  Neck: No masses or JVD are noted.    Resp: Easy and unlabored breathing.   Neuro: Alert and oriented, communicating clearly.   Ext: no swelling or edema     Data:  Lab Results   Component Value Date     12/03/2019    POTASSIUM 4.4 12/03/2019    CHLORIDE 106 12/03/2019    CO2 29 12/03/2019    ANIONGAP 4 12/03/2019     (H) 12/03/2019 "    BUN 43 (H) 12/03/2019    CR 1.80 (H) 12/03/2019    ANTOINETTE 9.8 12/03/2019     Lab Results   Component Value Date    GFRESTIMATED 27 (L) 12/03/2019    GFRESTIMATED 34 (L) 11/11/2019    GFRESTIMATED 30 (L) 11/06/2019    GFRESTBLACK 32 (L) 12/03/2019    GFRESTBLACK 40 (L) 11/11/2019    GFRESTBLACK 34 (L) 11/06/2019      Lab Results   Component Value Date    MICROL 31 05/22/2019    UMALCR 31.53 (H) 05/22/2019        Lab Results   Component Value Date    A1C 6.5 (H) 04/30/2019    A1C 5.2 09/10/2018    A1C 9.3 (H) 07/01/2018    A1C 8.2 (H) 03/19/2018    A1C 7.3 (H) 08/09/2017    HEMOGLOBINA1 7.2 (A) 10/31/2019    HEMOGLOBINA1 6.5 (A) 04/03/2019    HEMOGLOBINA1 5.5 11/29/2018     No results found for: CPEPT, GADAB, ISCAB    Lab Results   Component Value Date    CHOL 170 03/13/2019    CHOL 203 (H) 03/19/2018    TRIG 118 03/13/2019    TRIG 212 (H) 03/19/2018    HDL 71 03/13/2019    HDL 50 03/19/2018    LDL 76 03/13/2019     (H) 03/19/2018    NHDL 99 03/13/2019    NHDL 153 (H) 03/19/2018       Assessment and Plan:  Type 2 diabetes mellitus with stage 3 chronic kidney disease, with long-term current use of insulin (H)    Mariel is a 74 year old female with type II diabetes that is currently not well controlled and blood sugars are well above goal in the last month, possibly related to decreased activity. Genearlly above 200 fasting but some are coming down during the day, so Prandin is working. I increased her Lantus to 31 units every morning.  If your morning blood sugars are still often >200 in one week, please increase to 33 units daily. I told her to continue taking Prandin with each meal. When she refills the Prandin the dose will be higher and she will take 1 mg before each meal. If she blood sugars are higher than 350 twice in one day, then she should call. I also discussed seeing nephrology with her cardiology provider, Magdalena Hall, who agreed appropriate.  We will see her back in 4  weeks to see if therapy  needs to be augmented and to monitor for any low BG. Therapy is limited by compromised liver function and cognition.   - repaglinide (PRANDIN) 1 MG tablet  Dispense: 90 tablet; Refill: 3  - insulin glargine (LANTUS SOLOSTAR) 100 UNIT/ML pen  Dispense: 15 mL; Refill: 3  - NEPHROLOGY ADULT REFERRAL        Follow-up: Return in 3-4 weeks.     >50% of 30 minute visit spent in face to face counseling, education and coordination of care related to options for better glycemic control as well as preventing, detecting, and treating hypoglycemia.      It is my privilege to be involved in the care of the above patient.     Muriel Will PA-C, [facility]  [facility]  Diabetes, Endocrinology, and Metabolism  [phone] Appointments/Nurse  [phone] Fax  [phone] pager  [phone] nurse line               Scribe Disclosure:  I, Puja Martinez, am serving as a scribe to document services personally performed by Muriel Will PA-C at this visit, based upon the provider's statements to me. All documentation has been reviewed by the aforementioned provider prior to being entered into the official medical record.     Portions of this medical record were completed by a scribe. UPON MY REVIEW AND AUTHENTICATION BY ELECTRONIC SIGNATURE, this confirms (a) I performed the applicable clinical services, and (b) the record is accurate.
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
## 2019-12-13 ENCOUNTER — ALLIED HEALTH/NURSE VISIT (OUTPATIENT)
Dept: CARDIOLOGY | Facility: CLINIC | Age: 74
End: 2019-12-13
Attending: NURSE PRACTITIONER
Payer: MEDICARE

## 2019-12-13 DIAGNOSIS — I50.32 CHRONIC DIASTOLIC HEART FAILURE (H): Primary | Chronic | ICD-10-CM

## 2019-12-13 PROCEDURE — 93264 REM MNTR WRLS P-ART PRS SNR: CPT | Mod: ZP | Performed by: PHYSICIAN ASSISTANT

## 2019-12-13 NOTE — Clinical Note
Charges have been dropped for CardioMems billing. Please document and sign visit.   Flaco Foreman, [facility] Heart Failure Admin/Referrals

## 2019-12-26 ENCOUNTER — PATIENT OUTREACH (OUTPATIENT)
Dept: CARDIOLOGY | Facility: CLINIC | Age: 74
End: 2019-12-26

## 2019-12-26 NOTE — PROGRESS NOTES
"[facility] - CardioMEMS Reading Review      CardioMEMS reviewed and routed to patient's provider, SILVANO Gutierrez.   Current diuretic: Bumex 2mg TID  Recent plan of care changes: last seen in [place] 12/3/19    Current Threshold parameters:       Today's Waveform:       Readings:           RHC Date Wedge Pressure MEMS PAD during cath  (average of the 3 values)     2019 w/MEMS implant     20 mmHg   18 mmHg           Serena Streeter RN BSN CHFN  Cardiology Care Coordinator - [facility]  Questions and [phone]  First press #1 for the [place] and then press #4 for \"Your Care Team\" to reach us Cardiology Nurses.                   "

## 2020-01-08 ENCOUNTER — OFFICE VISIT (OUTPATIENT)
Dept: URGENT CARE | Facility: URGENT CARE | Age: 75
End: 2020-01-08
Payer: MEDICARE

## 2020-01-08 VITALS
OXYGEN SATURATION: 94 % | WEIGHT: 293 LBS | DIASTOLIC BLOOD PRESSURE: 59 MMHG | BODY MASS INDEX: 47.09 KG/M2 | TEMPERATURE: 97.6 F | SYSTOLIC BLOOD PRESSURE: 131 MMHG | HEIGHT: 66 IN | HEART RATE: 61 BPM

## 2020-01-08 DIAGNOSIS — R07.89 CHEST WALL PAIN: ICD-10-CM

## 2020-01-08 DIAGNOSIS — K43.2 INCISIONAL HERNIA, WITHOUT OBSTRUCTION OR GANGRENE: ICD-10-CM

## 2020-01-08 DIAGNOSIS — J44.0 CHRONIC OBSTRUCTIVE PULMONARY DISEASE WITH (ACUTE) LOWER RESPIRATORY INFECTION (H): Primary | ICD-10-CM

## 2020-01-08 DIAGNOSIS — R06.02 SOB (SHORTNESS OF BREATH): ICD-10-CM

## 2020-01-08 PROCEDURE — 94640 AIRWAY INHALATION TREATMENT: CPT | Performed by: PHYSICIAN ASSISTANT

## 2020-01-08 PROCEDURE — 99214 OFFICE O/P EST MOD 30 MIN: CPT | Mod: 25 | Performed by: PHYSICIAN ASSISTANT

## 2020-01-08 PROCEDURE — 93000 ELECTROCARDIOGRAM COMPLETE: CPT | Performed by: PHYSICIAN ASSISTANT

## 2020-01-08 RX ORDER — AZITHROMYCIN 250 MG/1
TABLET, FILM COATED ORAL
Qty: 6 TABLET | Refills: 0 | Status: SHIPPED | OUTPATIENT
Start: 2020-01-08 | End: 2020-02-27

## 2020-01-08 RX ORDER — PREDNISONE 20 MG/1
20 TABLET ORAL 2 TIMES DAILY
Qty: 10 TABLET | Refills: 0 | Status: SHIPPED | OUTPATIENT
Start: 2020-01-08 | End: 2020-02-27

## 2020-01-08 RX ORDER — ALBUTEROL SULFATE 0.83 MG/ML
2.5 SOLUTION RESPIRATORY (INHALATION) ONCE
Status: COMPLETED | OUTPATIENT
Start: 2020-01-08 | End: 2020-01-08

## 2020-01-08 RX ADMIN — ALBUTEROL SULFATE 2.5 MG: 0.83 SOLUTION RESPIRATORY (INHALATION) at 21:10

## 2020-01-08 ASSESSMENT — MIFFLIN-ST. JEOR: SCORE: 1991.4

## 2020-01-09 ENCOUNTER — PATIENT OUTREACH (OUTPATIENT)
Dept: CARE COORDINATION | Facility: CLINIC | Age: 75
End: 2020-01-09

## 2020-01-09 NOTE — PROGRESS NOTES
"Clinic Care Coordination Contact    Follow Up Progress Note      Assessment: proactive outreach call placed to patient. She went to  yesterday with URI. Was prescribed prednisone and zithromycin, both medications she has picked up. States she is feeling about the same as yesterday. Has a new incisional hernia at the site of a previous heart surgery. Causing pain with coughing because it \"pops out\" when she coughs. Has been attending specialty appointments with cardiology, endocrinology and is followed closely by [facility] care coordination. Does not weigh self daily as instructed. Used to but states none of her specialists ever ask for the information.     Goal:     4-Goal Statement: I will weigh myself every day and will call my clinic with a daily weight gain over 2 lbs or 5 lbs in a week  Measure of Success: weighs self daily  Supportive Steps to Achieve: RN CC support  Barriers: none  Strengths: willing to comply  Date to Achieve By: 2-1-20  Patient expressed understanding of goal: yes      Intervention/Education provided during outreach: advised to splint with cough. Can try ice pack to hernia site to reduce pain. Already taking tylenol as prescribed. Weight gain parameters reviewed with would prompt call to clinic          Plan:   Home treatment measures as above. Call with weight gain above parameters.   Care Coordinator will follow up in approximately one month    Vibha Carvajal RN  [facility]-Care Coordination  [facility]  [facility]  [phone]      "

## 2020-01-09 NOTE — PROGRESS NOTES
"HPI:  Mariel is a 73 yo female who presents for chest congestion and cough x 2 weeks.  Reports SOB and frequent use of inhaler.  Reports nasal congestion.  Denies sore throat or fever.  Reports hoarse voice.    Patient also reports chest pain over sternum that is new today.  Pain with coughing. Denies pain if she is not coughing.  Location of pain is below an incisional scar from open hear surgery.    Patient also reports a \"bulge\" beneath sternum that protrudes with cough or lying on her back.    Hx of COPD, diastolic HF, TIA, HTN SP CABG x 3      ROS:  See HPI      PE:  Vitals & nursing notes reviewed. B/P: 131/59, T: 97.6, P: 61, R: Data Unavailable  Constitutional:  Alert, well nourished, well-developed, NAD  Head:  Atraumatic, normocephalic  Eyes:  Perrla, EOMI, conjunctiva:  Pink   Sclera:  Anicteric  Ears:  Canals clear BL, TM pearly BL  Throat:  No erythema, exudates, or edema to postoropharynx  Neck:  Supple, no cervical LAD  Lungs:  Tight lung sounds with scattered expiratory wheezes, No rhonchi, or rales  CV:  RRR,  no murmur appreciated  Abdominal:  Defect in upper epigastric abdominal wall, located surgical incision.        ASSESSMENT:  1.  COPD with Lower respiratory infection  2. SOB  3. Chest wall pain  4.  Incisional hernia in upper epigastric region - no obstruction or strangulation appreciated Plan: azithromycin (ZITHROMAX) 250 MG tablet,  Comment:  EKG normal sinus rhythm, no ST changes.  Chest wall pain seems related to hernia and cough.  Patient instructed to follow up with her PCP or surgeon re: hernia.  DIscussed signs and sx of obstruction or strangulation of hernia which would constitute and emergency.  Patient reports she understands and will follow up with her doctor    Plan:  Zpak - See orders  Prednisone - see orders.  Neb tx in clinic with albuterol sulfate - Pulse ox after tx was 97% (up fro 94%)    Follow-up with surgeon re: abdominal hernia (see comments).    Tylenol or Advil for HA, " body aches, fever PRN.  ALbuterrol inhaler q 4-6 hrs PRN SOB/wheezing.   F/U with PCP if sx persist or worsen.
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
## 2020-01-10 ENCOUNTER — TELEPHONE (OUTPATIENT)
Dept: CARDIOLOGY | Facility: CLINIC | Age: 75
End: 2020-01-10

## 2020-01-10 NOTE — TELEPHONE ENCOUNTER
[facility] Call Center    Phone Message    May a detailed message be left on voicemail: yes    Reason for Call: Other: Mariel calling to request a call back from Serena. Mariel said she was in the ED on 1/8/20, and she was told she has a hernia that is coming out from underneath her sternum. Mariel wanted to speak with Serena about this. Please give Mariel a call back at your earliest convenience.     Action Taken: Message routed to:  [facility]

## 2020-01-13 NOTE — TELEPHONE ENCOUNTER
"Called Mariel back.  She states \"I have they think a hernia underneath my sternum\", pain in that area particularly when I cough and  \"I'm pushing it back in myself sometimes\".  Urgent care informed Mariel she should notify her Cardiology team, \"because it's so close to my heart\".  Mariel is also asking who she should see about fixing the hernia, GI? Will forward to [place] for advice.  Serena Streeter RN     "

## 2020-01-16 ENCOUNTER — ALLIED HEALTH/NURSE VISIT (OUTPATIENT)
Dept: CARDIOLOGY | Facility: CLINIC | Age: 75
End: 2020-01-16
Attending: NURSE PRACTITIONER
Payer: MEDICARE

## 2020-01-16 DIAGNOSIS — I50.32 CHRONIC DIASTOLIC HEART FAILURE (H): Primary | Chronic | ICD-10-CM

## 2020-01-16 DIAGNOSIS — Z53.9 ERRONEOUS ENCOUNTER--DISREGARD: ICD-10-CM

## 2020-01-16 NOTE — Clinical Note
Charges have been dropped for [facility] billing. Please document and sign visit.   Flaco Foreman, [facility] Heart Failure Admin/Referrals

## 2020-02-03 ENCOUNTER — PATIENT OUTREACH (OUTPATIENT)
Dept: CARDIOLOGY | Facility: CLINIC | Age: 75
End: 2020-02-03

## 2020-02-13 ENCOUNTER — PATIENT OUTREACH (OUTPATIENT)
Dept: CARE COORDINATION | Facility: CLINIC | Age: 75
End: 2020-02-13

## 2020-02-13 ENCOUNTER — TELEPHONE (OUTPATIENT)
Dept: CARE COORDINATION | Facility: CLINIC | Age: 75
End: 2020-02-13

## 2020-02-13 DIAGNOSIS — R29.898 BILATERAL LEG WEAKNESS: Primary | ICD-10-CM
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
## 2020-02-13 NOTE — PROGRESS NOTES
"Clinic Care Coordination Contact    Follow Up Progress Note      Assessment: pro-active outreach call placed to patient. She reports a recent fall. States her legs gave out on her and \"I fell on my butt\". Complains of tail bone area pain. She was trying to get to her walker \"but I didn't make it\". Has had several falls recently. Used to have [facility] and used this for 3 years with no falls so she returned it. Has upcoming appointment with PCP to evaluate incisional hernia at site of previous heart surgery. Diagnosed by urgent care provider and referred to surgeon but she called her heart doctor's office who redirected her to her PCP for evaluation      Goals addressed this encounter:   Goals Addressed                 This Visit's Progress      # 3 Medical (pt-stated)   Not on track     1-Goal Statement:I will prevent a future fall   Measure of Success: No falls  Supportive Steps to Achieve:  I will use cane or walker at all times   I will use lifeline when I go to the bathroom because walker doesn't fit   Barriers:Lightheaded /Dizzy leg weakness   Strengths: outpatient PT/OT 3/6  Date to Achieve By: 1-1-20  Patient expressed understanding of goal: Yes               Intervention/Education provided during outreach: strongly consider reinstating [facility] unit with auto-alert feature          Plan:   Will ask PCP if she will evaluate hernia or if she wants surgeon to see patient. Will ask PCP for outpatient physical therapy referral for strengthening due to general deconditioning. Patient will think about [facility] or similar service and will call me back if interested and I will send her information for review.  Care Coordinator will follow up in approximately one month    Vibha Carvajal RN  [place] [facility]-Care Coordination  [facility]  [facility]  [phone]      "

## 2020-02-13 NOTE — TELEPHONE ENCOUNTER
"Please see my care coordination note. Patient with two recent falls at home due to legs \"gave out on me\". Would like outpatient physical therapy for strengthening. Order please if approved.     She is scheduled to see you in clinic 2- for incisional hernia (heart surgery). She was directed to be evaluated by you when she called her heart clinic but chart review reveals she was diagnosed in urgent care and referred to see a surgeon. Can you clarify if you should evaluate this or if she should go to see a surgeon. She is concerned about it but doesn't want to \"waste a trip\". Please advise-Thanks    Vibha Carvajal RN  [place] [facility]-Care Coordination  [facility] Primary Care  [facility]  [phone]      "

## 2020-02-14 DIAGNOSIS — I50.32 CHRONIC DIASTOLIC HEART FAILURE (H): Primary | ICD-10-CM

## 2020-02-14 NOTE — TELEPHONE ENCOUNTER
discussed with patient who agrees with plan    Vibha Carvajal, JODY  [place] [facility]-Care Coordination  [facility] Primary Care  [facility]  [phone]

## 2020-02-17 ENCOUNTER — ALLIED HEALTH/NURSE VISIT (OUTPATIENT)
Dept: CARDIOLOGY | Facility: CLINIC | Age: 75
End: 2020-02-17
Attending: PHYSICIAN ASSISTANT
Payer: MEDICARE

## 2020-02-17 DIAGNOSIS — Z53.9 ERRONEOUS ENCOUNTER--DISREGARD: ICD-10-CM

## 2020-02-17 DIAGNOSIS — I50.32 CHRONIC DIASTOLIC HEART FAILURE (H): Primary | Chronic | ICD-10-CM

## 2020-02-17 NOTE — Clinical Note
Charges have been dropped for [employer] billing. Please document and sign visit.   Flaco Foreman, [facility] Heart Failure Admin/Referrals
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
## 2020-02-18 ENCOUNTER — OFFICE VISIT (OUTPATIENT)
Dept: CARDIOLOGY | Facility: CLINIC | Age: 75
End: 2020-02-18
Attending: PHYSICIAN ASSISTANT
Payer: MEDICARE

## 2020-02-18 VITALS
SYSTOLIC BLOOD PRESSURE: 110 MMHG | HEIGHT: 66 IN | WEIGHT: 293 LBS | HEART RATE: 60 BPM | DIASTOLIC BLOOD PRESSURE: 45 MMHG | OXYGEN SATURATION: 96 % | BODY MASS INDEX: 47.09 KG/M2

## 2020-02-18 DIAGNOSIS — I50.32 CHRONIC DIASTOLIC HEART FAILURE (H): ICD-10-CM

## 2020-02-18 DIAGNOSIS — I50.32 CHRONIC DIASTOLIC HEART FAILURE (H): Chronic | ICD-10-CM

## 2020-02-18 DIAGNOSIS — I25.10 CORONARY ARTERY DISEASE INVOLVING NATIVE CORONARY ARTERY OF NATIVE HEART WITHOUT ANGINA PECTORIS: ICD-10-CM

## 2020-02-18 DIAGNOSIS — N18.4 CKD (CHRONIC KIDNEY DISEASE) STAGE 4, GFR 15-29 ML/MIN (H): Primary | ICD-10-CM

## 2020-02-18 DIAGNOSIS — Z95.1 S/P CABG (CORONARY ARTERY BYPASS GRAFT): ICD-10-CM

## 2020-02-18 LAB
ANION GAP SERPL CALCULATED.3IONS-SCNC: 4 MMOL/L (ref 3–14)
BUN SERPL-MCNC: 38 MG/DL (ref 7–30)
CALCIUM SERPL-MCNC: 9.7 MG/DL (ref 8.5–10.1)
CHLORIDE SERPL-SCNC: 108 MMOL/L (ref 94–109)
CO2 SERPL-SCNC: 29 MMOL/L (ref 20–32)
CREAT SERPL-MCNC: 1.89 MG/DL (ref 0.52–1.04)
GFR SERPL CREATININE-BSD FRML MDRD: 26 ML/MIN/{1.73_M2}
GLUCOSE SERPL-MCNC: 188 MG/DL (ref 70–99)
POTASSIUM SERPL-SCNC: 4.4 MMOL/L (ref 3.4–5.3)
SODIUM SERPL-SCNC: 141 MMOL/L (ref 133–144)

## 2020-02-18 PROCEDURE — G0463 HOSPITAL OUTPT CLINIC VISIT: HCPCS

## 2020-02-18 PROCEDURE — 80048 BASIC METABOLIC PNL TOTAL CA: CPT | Performed by: PHYSICIAN ASSISTANT

## 2020-02-18 PROCEDURE — 99214 OFFICE O/P EST MOD 30 MIN: CPT | Mod: ZP | Performed by: PHYSICIAN ASSISTANT

## 2020-02-18 PROCEDURE — 36415 COLL VENOUS BLD VENIPUNCTURE: CPT | Performed by: PHYSICIAN ASSISTANT

## 2020-02-18 RX ORDER — BUMETANIDE 1 MG/1
TABLET ORAL
Qty: 360 TABLET | Refills: 3 | Status: SHIPPED | OUTPATIENT
Start: 2020-02-18 | End: 2020-06-17

## 2020-02-18 RX ORDER — LOSARTAN POTASSIUM 50 MG/1
TABLET ORAL
Qty: 135 TABLET | Refills: 3 | Status: SHIPPED | OUTPATIENT
Start: 2020-02-18 | End: 2020-02-28

## 2020-02-18 ASSESSMENT — MIFFLIN-ST. JEOR: SCORE: 1998.21

## 2020-02-18 ASSESSMENT — PAIN SCALES - GENERAL: PAINLEVEL: NO PAIN (0)

## 2020-02-18 NOTE — PATIENT INSTRUCTIONS
Take your medicines every day, as directed    Changes made today:  o Decrease losartan to 25 mg in the morning and 50 mg at night  o No other changes to your medications   Monitor Your Weight and Symptoms    Contact us if you:      Gain 2 pounds in one day or 5 pounds in one week    Feel more short of breath    Notice more leg swelling    Feel lightheadeded   Change your lifestyle    Limit Salt or Sodium:    2000 mg  Limit Fluids:    2000 mL or approximately 64 ounces  Eat a Heart Healthy Diet    Low in saturated fats  Stay Active:    Aim to move at least 150 minutes every  week         To Contact us    During Business Hours:  [phone], option # 1 (University)  Then option # 4 (medical questions)     After hours, weekends or holidays:   [phone], Option #4  Ask to speak to the On-Call Cardiologist. Inform them you are a CORE/heart failure patient at the [facility].     Use [employer] allows you to communicate directly with your heart team through secure messaging.    [email or web address] can be accessed any time on your phone, computer, or tablet.    If you need assistance, we'd be happy to help!         Keep your Heart Appointments:    - Phone call in 1-2 weeks to review blood pressures, please write them down every day  - Ask your primary care Doctor if there are any scar creams they recommend or if you should see dermatology  - Make sure that you call Dr. Will's office to talk about your blood sugars   - CORE in 6 weeks  - Dr. Wolf as scheduled in May
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
## 2020-02-18 NOTE — NURSING NOTE
Chief Complaint   Patient presents with     Follow Up     74 year old female with chronic diastolic heart failure presents for follow up with labs prior.      Vitals were taken and medication were reviewed.      Alisa Stubbs, Student MA  11:03 AM

## 2020-02-25 ENCOUNTER — PATIENT OUTREACH (OUTPATIENT)
Dept: CARDIOLOGY | Facility: CLINIC | Age: 75
End: 2020-02-25

## 2020-02-25 NOTE — PROGRESS NOTES
[facility] - CardioMEMS Reading Review      CardioMEMS reviewed and routed to patient's provider, Yuni SCHAEFER.   Current diuretic: Bumex 4/2/2 mg  Recent plan of care changes: none    Current Threshold parameters:       Today's Waveform:       Readings:           RHC Date Wedge Pressure MEMS PAD during cath  (average of the 3 values)     7/1/2019 w/MEMS implant     20 mmHg   18 mmHg           Rosina Mays RN

## 2020-02-27 ENCOUNTER — PATIENT OUTREACH (OUTPATIENT)
Dept: CARDIOLOGY | Facility: CLINIC | Age: 75
End: 2020-02-27

## 2020-02-27 ENCOUNTER — ANCILLARY PROCEDURE (OUTPATIENT)
Dept: GENERAL RADIOLOGY | Facility: CLINIC | Age: 75
End: 2020-02-27
Attending: FAMILY MEDICINE
Payer: MEDICARE

## 2020-02-27 ENCOUNTER — OFFICE VISIT (OUTPATIENT)
Dept: FAMILY MEDICINE | Facility: CLINIC | Age: 75
End: 2020-02-27
Payer: MEDICARE

## 2020-02-27 VITALS
DIASTOLIC BLOOD PRESSURE: 68 MMHG | TEMPERATURE: 98 F | SYSTOLIC BLOOD PRESSURE: 134 MMHG | HEART RATE: 60 BPM | RESPIRATION RATE: 16 BRPM

## 2020-02-27 DIAGNOSIS — I50.32 CHRONIC DIASTOLIC HEART FAILURE (H): Chronic | ICD-10-CM

## 2020-02-27 DIAGNOSIS — E11.22 TYPE 2 DIABETES MELLITUS WITH STAGE 3 CHRONIC KIDNEY DISEASE, WITH LONG-TERM CURRENT USE OF INSULIN (H): ICD-10-CM

## 2020-02-27 DIAGNOSIS — K43.2 INCISIONAL HERNIA, WITHOUT OBSTRUCTION OR GANGRENE: ICD-10-CM

## 2020-02-27 DIAGNOSIS — Z17.0 MALIGNANT NEOPLASM OF LOWER-INNER QUADRANT OF LEFT BREAST IN FEMALE, ESTROGEN RECEPTOR POSITIVE (H): ICD-10-CM

## 2020-02-27 DIAGNOSIS — E66.01 MORBID (SEVERE) OBESITY DUE TO EXCESS CALORIES (H): ICD-10-CM

## 2020-02-27 DIAGNOSIS — M53.3 PAIN IN THE COCCYX: Primary | ICD-10-CM

## 2020-02-27 DIAGNOSIS — Z79.4 TYPE 2 DIABETES MELLITUS WITH STAGE 3 CHRONIC KIDNEY DISEASE, WITH LONG-TERM CURRENT USE OF INSULIN (H): ICD-10-CM

## 2020-02-27 DIAGNOSIS — M53.3 PAIN IN THE COCCYX: ICD-10-CM

## 2020-02-27 DIAGNOSIS — N18.30 TYPE 2 DIABETES MELLITUS WITH STAGE 3 CHRONIC KIDNEY DISEASE, WITH LONG-TERM CURRENT USE OF INSULIN (H): ICD-10-CM

## 2020-02-27 DIAGNOSIS — C50.312 MALIGNANT NEOPLASM OF LOWER-INNER QUADRANT OF LEFT BREAST IN FEMALE, ESTROGEN RECEPTOR POSITIVE (H): ICD-10-CM

## 2020-02-27 DIAGNOSIS — F32.0 MILD MAJOR DEPRESSION (H): ICD-10-CM

## 2020-02-27 PROCEDURE — 72220 X-RAY EXAM SACRUM TAILBONE: CPT

## 2020-02-27 PROCEDURE — 99214 OFFICE O/P EST MOD 30 MIN: CPT | Performed by: FAMILY MEDICINE

## 2020-02-27 RX ORDER — FLASH GLUCOSE SCANNING READER
1 EACH MISCELLANEOUS
Qty: 1 DEVICE | Refills: 0 | Status: SHIPPED | OUTPATIENT
Start: 2020-02-27 | End: 2020-12-09

## 2020-02-27 RX ORDER — FLASH GLUCOSE SENSOR
1 KIT MISCELLANEOUS
Qty: 6 EACH | Refills: 3 | Status: SHIPPED | OUTPATIENT
Start: 2020-02-27 | End: 2020-09-18

## 2020-02-27 ASSESSMENT — ANXIETY QUESTIONNAIRES
5. BEING SO RESTLESS THAT IT IS HARD TO SIT STILL: NOT AT ALL
7. FEELING AFRAID AS IF SOMETHING AWFUL MIGHT HAPPEN: NOT AT ALL
GAD7 TOTAL SCORE: 2
3. WORRYING TOO MUCH ABOUT DIFFERENT THINGS: NOT AT ALL
6. BECOMING EASILY ANNOYED OR IRRITABLE: SEVERAL DAYS
2. NOT BEING ABLE TO STOP OR CONTROL WORRYING: NOT AT ALL
1. FEELING NERVOUS, ANXIOUS, OR ON EDGE: NOT AT ALL

## 2020-02-27 ASSESSMENT — PATIENT HEALTH QUESTIONNAIRE - PHQ9
SUM OF ALL RESPONSES TO PHQ QUESTIONS 1-9: 8
5. POOR APPETITE OR OVEREATING: SEVERAL DAYS

## 2020-02-27 NOTE — TELEPHONE ENCOUNTER
Called Mariel to follow up on blood pressures after reducing Losartan in clinic last week. Left voicemail and asked for call back.   Rosina Mays RN

## 2020-02-27 NOTE — PROGRESS NOTES
Subjective         HPI   Presents for recheck of substernal hernia noted in UC.  This is below incision from CABG.  It has started to pop out and bothers here.  Needs to push it back in sometimes for comfort.  No dysphagia.  It has been a nuisance.    Recent diagnosis of DCIS- surveillance was chosen over surgery as patient is so high-risk right now-- deemed not a good surgical candidate with risks outweighing benefit of surgery.    Cannot get the Freestyle [place]- [facility] does not carry these-- would like a new rx sent to our pharmacy today.    Fell two weeks ago on tailbone at home on carpeted floor.  Chiropractor would like xray for review.  Patient notes pain with sitting or putting any pressure on this spot.  The donut pillow has not been large enough for her for comfort.    Mood stable today.  On Lexapro.    Patient Active Problem List   Diagnosis     Hypothyroidism      HX PHYSICAL ABUSE     Coronary atherosclerosis     Myalgia and myositis     Migraine     Chronic airway obstruction/ COPD     Mononeuritis     Obstructive sleep apnea     Vitamin D deficiency     Hypertension goal BP (blood pressure) < 130/80     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)     Hyperlipidemia with target LDL less than 70     Chronic diastolic heart failure (H)     Osteoarthritis     Morbid obesity (H)     Arthritis of knee     Transient cerebral ischemia     History of thyroid cancer     Cervicalgia     Fatty liver disease, nonalcoholic     Hepatomegaly     Angina at rest (H)     S/P CABG x 3     Type 2 diabetes mellitus with stage 3 chronic kidney disease, with long-term current use of insulin (H)     Lymphedema     Lower back pain     Bilateral carotid artery disease (H)     Spinal stenosis of lumbar region, unspecified whether neurogenic claudication present     Status post coronary angiogram     Hemoptysis     Intertrigo     Malignant neoplasm of lower-inner quadrant of left breast in female, estrogen receptor positive (H)      Past Surgical History:   Procedure Laterality Date     ANGIOGRAPHY  8/11    with stent placement X2 mid- and proximal LAD     ARTHROPLASTY KNEE  1/28/2014    Procedure: ARTHROPLASTY KNEE;  ARTHROPLASTY KNEE -RIGHT TOTAL  ;  Surgeon: Victor Manuel Wolff MD;  Location: [facility] OR     BYPASS GRAFT ARTERY CORONARY N/A 7/2/2018    Procedure: BYPASS GRAFT ARTERY CORONARY;  Median Sternotomy, On Cardiopulmonary Bypass Pump, Coronary Artery Bypass Graft x 3, using Left Internal Mammary and Endoscopic Vein Grapevine on Bilateral Saphenous Vein, Transesophageal Echocardiogram, Epi-aortic Ultrasound;  Surgeon: Joao Mason MD;  Location: [facility] OR     C TOTAL KNEE ARTHROPLASTY  7/30/04    Knee Replacement, Total right and left     CATARACT IOL, RT/LT Bilateral      COLONOSCOPY       CV CARDIOMEMS WITH RIGHT HEART CATH N/A 7/1/2019    Procedure: CV CARDIOMEMS;  Surgeon: Michele Arce MD;  Location:  HEART CARDIAC CATH LAB     CV PULMONARY ANGIOGRAM N/A 7/1/2019    Procedure: Pulmonary Angiogram;  Surgeon: Michele Arce MD;  Location:  HEART CARDIAC CATH LAB     CV RIGHT HEART CATH N/A 7/1/2019    Procedure: CV RIGHT HEART CATH;  Surgeon: Michele Arce MD;  Location:  HEART CARDIAC CATH LAB     DILATION AND CURETTAGE, OPERATIVE HYSTEROSCOPY WITH MORCELLATOR, COMBINED N/A 11/1/2016    Procedure: COMBINED DILATION AND CURETTAGE, OPERATIVE HYSTEROSCOPY WITH MORCELLATOR;  Surgeon: Stacey Arnold DO;  Location: [facility] INTRODUCE NEEDLE/CATH, EXTREMITY ARTERY  1999,2002,2004    Angiocardiogram     HC KNEE SCOPE, DIAGNOSTIC  1990's    Arthroscopy, Knee, bilateral     LAPAROSCOPIC CHOLECYSTECTOMY N/A 8/11/2017    Procedure: LAPAROSCOPIC CHOLECYSTECTOMY;  Laparoscopic Cholecystectomy   *Latex Allergy*, Anesthesia Block;  Surgeon: Jeffrey Roberson MD;  Location: [facility] OR     PHACOEMULSIFICATION CLEAR CORNEA WITH STANDARD INTRAOCULAR LENS IMPLANT Right 12/29/2014    Procedure: PHACOEMULSIFICATION CLEAR CORNEA WITH  STANDARD INTRAOCULAR LENS IMPLANT;  Surgeon: Smith Quintana MD;  Location: [facility]     PHACOEMULSIFICATION CLEAR CORNEA WITH TORIC INTRAOCULAR LENS IMPLANT Left 2015    Procedure: PHACOEMULSIFICATION CLEAR CORNEA WITH TORIC INTRAOCULAR LENS IMPLANT;  Surgeon: Smith Quintana MD;  Location: [facility]     SURGICAL HISTORY OF -       dentures     SURGICAL HISTORY OF -       thyroidectomy     SURGICAL HISTORY OF -       right thumb surgery     SURGICAL HISTORY OF -       right breast biopsy (benign)     SURGICAL HISTORY OF -   2004    left shoulder surgery - rotator cuff     SURGICAL HISTORY OF -       left thumb surgery     THYROIDECTOMY         Social History     Tobacco Use     Smoking status: Former Smoker     Packs/day: 0.00     Years: 27.00     Pack years: 0.00     Types: Cigarettes     Last attempt to quit: 1989     Years since quittin.6     Smokeless tobacco: Never Used   Substance Use Topics     Alcohol use: No     Alcohol/week: 0.0 standard drinks     Family History   Problem Relation Age of Onset     C.A.D. Mother      Diabetes Mother      Hypertension Mother      Blood Disease Mother         multiple episodes of thrombosis     Circulatory Mother         DVT X 2; ocular clot; cerebral; carotid artery stenosis     Glaucoma Mother      Macular Degeneration Mother      C.A.D. Father      Hypertension Father      Cerebrovascular Disease Father      Alcohol/Drug Father         etoh     Cancer Brother         liver,pancreas, brain     Cardiovascular Sister      Hypertension Sister      Hypertension Brother      Alcohol/Drug Sister         etoh     Alcohol/Drug Brother         drug     Diabetes Sister         younger     C.A.D. Sister         CABG age 65     C.A.D. Brother         CABG age 42     C.A.D. Sister         stents age 58     C.A.D. Brother      Genitourinary Problems Sister         kidney disease         Current Outpatient Medications   Medication Sig Dispense  Refill     acetaminophen (TYLENOL) 325 MG tablet Take 650 mg by mouth every 4 hours as needed for mild pain Take 2 tablets by mouth every 4 hours as needed for mild pain 100 tablet      albuterol (PROAIR HFA) 108 (90 Base) MCG/ACT inhaler Inhale 1-2 puffs into the lungs every 4 hours as needed for wheezing 8.5 g      aspirin (ASA) 81 MG EC tablet Take 1 tablet (81 mg) by mouth daily       atorvastatin (LIPITOR) 40 MG tablet TAKE 1 TABLET(40 MG) BY MOUTH DAILY 90 tablet 1     bumetanide (BUMEX) 1 MG tablet As directed marcy CORE, continue current dose of 4 mg in the morning, 2 mg in the afternoon, and 2 mg in the evening 360 tablet 3     buPROPion (WELLBUTRIN XL) 150 MG 24 hr tablet Take 1 tablet (150 mg) by mouth every morning 30 tablet 3     capsaicin (ZOSTRIX) 0.025 % external cream Apply 1 g topically 3 times daily For neuropathic pain. 60 g 1     Continuous Blood Gluc  (FREESTYLE MARTY 14 DAY READER) JEZ 1 Units 4 times daily (before meals and nightly) 1 Device 0     Continuous Blood Gluc Sensor (FightMeSTYLE MARTY 14 DAY SENSOR) MISC 1 Units 4 times daily (before meals and nightly) 6 each 3     EPINEPHrine (EPIPEN/ADRENACLICK/OR ANY BX GENERIC EQUIV) 0.3 MG/0.3ML injection 2-pack Inject 0.3 mLs (0.3 mg) into the muscle once as needed for anaphylaxis 0.6 mL 0     ferrous gluconate (FERGON) 324 (38 Fe) MG tablet Take 1 tablet (324 mg) by mouth Every Mon, Wed, Fri Morning 36 tablet 3     folic acid (FOLVITE) 1 MG tablet Take 2 tablets (2 mg) by mouth daily       guaiFENesin (MUCINEX) 600 MG 12 hr tablet Take 1 tablet (600 mg) by mouth 2 times daily       insulin glargine (LANTUS SOLOSTAR) 100 UNIT/ML pen Inject  31 units  daily subcutaneously.  call  if blood sugar <70, often >200. 15 mL 3     levothyroxine (SYNTHROID) 150 MCG tablet Take 1 tablet (150 mcg) by mouth daily 90 tablet 3     metoprolol succinate ER (TOPROL-XL) 25 MG 24 hr tablet Please take 25 mg in the morning and 50 mg at night. 120 tablet 11  "    niacin ER (NIASPAN) 500 MG CR tablet TAKE 1 TABLET(500 MG) BY MOUTH TWICE DAILY 180 tablet 2     nitroGLYcerin (NITROSTAT) 0.4 MG sublingual tablet For chest pain place 1 tablet under the tongue every 5 minutes for 3 doses. If symptoms persist 5 minutes after 1st dose call 911. 25 tablet 0     potassium chloride ER (K-DUR/KLOR-CON M) 10 MEQ CR tablet Take 2 tablets (20 mEq) by mouth 3 times daily 120 tablet 3     pregabalin (LYRICA) 100 MG capsule Take 1 capsule (100 mg) by mouth 2 times daily 180 capsule 3     repaglinide (PRANDIN) 1 MG tablet Take 1 tablet (1 mg) by mouth 3 times daily (before meals) 90 tablet 3     senna-docusate (SENOKOT-S/PERICOLACE) 8.6-50 MG tablet Take 1-2 tablets by mouth 2 times daily as needed for constipation 30 tablet 0     Skin Protectants, Misc. (INTERDRY 10\"X36\") SHEE Externally apply 1 Box topically daily 1 sheet under breasts prn intertrigo 1 each 3     Skin Protectants, Misc. (INTERDRY AG TEXTILE 10\"X144\") SHEE Externally apply 1 Box topically daily Apply to areas of intertrigo prn 1 each 3     traMADol (ULTRAM) 50 MG tablet Take 1 tablet (50 mg) by mouth every 6 hours as needed for breakthrough pain or severe pain 30 tablet 0     traZODone (DESYREL) 50 MG tablet TAKE 3 TABLETS(150 MG) BY MOUTH AT BEDTIME 270 tablet 1     vitamin D3 (CHOLECALCIFEROL) 07408 units (250 mcg) capsule Take 1 capsule (10,000 Units) by mouth daily       escitalopram (LEXAPRO) 20 MG tablet TAKE 1 TABLET(20 MG) BY MOUTH EVERY MORNING 90 tablet 1     Garden PriceCAN FINEPOINT LANCETS MISC Use to test blood sugars 2 times daily or as directed. 100 each prn     losartan (COZAAR) 50 MG tablet Take 0.5 tablets (25 mg) by mouth 2 times daily Please take losartan 25 mg in morning (half tab) and 50 mg in the evening. 90 tablet 0     miconazole (MICATIN/MICRO GUARD) 2 % external powder Apply topically as needed for itching or other Redness in bilateral groin and  clef (Patient not taking: Reported on 2020) " 43 g 0     ONE TOUCH ULTRA (DEVICES) MISC test blood sugar BID 1 0     ONETOUCH ULTRA test strip USE FOUR TIMES DAILY 400 strip 1     Allergies   Allergen Reactions     Albuterol Other (See Comments)     Sandrita-oral erythema  Confirmed 7/3/18 that patient uses albuterol inhalers at home. Patient had her home inhaler in her bag.     Contrast Dye Anaphylaxis     Fish Allergy Anaphylaxis     Iodine Anaphylaxis     Oxycodone Other (See Comments)     Severe suicidal tendencies on this medication     Tree Nuts [Nuts] Anaphylaxis     Tree nuts only (peanuts ok)     Bactrim      Increased uric acid     Betadine [Povidone Iodine] Hives, Swelling and Difficulty breathing     Betadine     Combivent      Rash     Dulaglutide Other (See Comments)     Hx . Of thyroid cancer.     Fish      Lisinopril Other (See Comments)     Scr/grf severely reduced.      Penicillins Rash     Allopurinol Rash     Latex Rash     Wool Fiber Rash     Recent Labs   Lab Test 02/18/20  1024 12/03/19  1245  10/01/19  1133  04/30/19  0658  04/09/19  2050  03/13/19  1508 02/13/19  1449  12/24/18  1610  09/10/18  0614  08/28/18  1427  07/01/18  0621  03/19/18  1404  03/08/17  1420   A1C  --   --   --   --   --  6.5*  --   --   --   --   --   --   --   --  5.2  --   --   --  9.3*  --  8.2*   < > 7.2*   LDL  --   --   --   --   --   --   --   --   --  76  --   --   --   --   --   --   --   --   --   --  111*  --  89   HDL  --   --   --   --   --   --   --   --   --  71  --   --   --   --   --   --   --   --   --   --  50  --  50   TRIG  --   --   --   --   --   --   --   --   --  118  --   --   --   --   --   --   --   --   --   --  212*  --  237*   ALT  --   --   --   --   --   --   --  22  --   --  19  --  49  --   --   --   --    < > 26   < > 24   < >  --    CR 1.89* 1.80*   < > 1.59*   < >  --    < > 1.43*   < > 1.51* 1.42*  --  1.19*   < > 1.48*   < > 1.41*   < >  --    < > 1.28*   < > 1.19*   GFRESTIMATED 26* 27*   < > 32*   < >  --    < > 36*   < > 34*  37*  --  45*   < > 35*   < > 37*   < >  --    < > 41*   < > 45*   GFRESTBLACK 30* 32*   < > 37*   < >  --    < > 42*   < > 39* 42*  --  53*   < > 42*   < > 44*   < >  --    < > 50*   < > 54*   POTASSIUM 4.4 4.4   < > 4.0   < >  --    < > 4.1   < > 4.6 4.0   < > 4.5   < > 3.9   < > 4.0   < >  --    < > 3.6   < > 3.8   TSH  --   --   --  5.02*  --   --   --   --   --   --   --   --   --   --   --   --  2.97  --   --   --  1.15   < > 0.12*    < > = values in this interval not displayed.      BP Readings from Last 3 Encounters:   02/27/20 134/68   02/18/20 110/45   01/08/20 131/59    Wt Readings from Last 3 Encounters:   02/18/20 147.6 kg (325 lb 8 oz)   01/08/20 147 kg (324 lb)   12/03/19 147 kg (324 lb)                    Reviewed and updated as needed this visit by Provider         Review of Systems   ROS COMP: Constitutional, HEENT, cardiovascular, pulmonary, GI, , musculoskeletal, neuro, skin, endocrine and psych systems are negative, except as otherwise noted.      Objective    /68   Pulse 60   Temp 98  F (36.7  C) (Oral)   Resp 16     Physical Exam   GENERAL: healthy, alert and no distress  EYES: Eyes grossly normal to inspection, PERRL and conjunctivae and sclerae normal  HENT: ear canals and TM's normal, nose and mouth without ulcers or lesions  NECK: no adenopathy, no asymmetry, masses, or scars and thyroid normal to palpation  RESP: lungs clear to auscultation - no rales, rhonchi or wheezes  CV: regular rate and rhythm, normal S1 S2, no S3 or S4, no murmur, click or rub, no peripheral edema and peripheral pulses strong  ABDOMEN: soft, nontender, obese,  no hepatosplenomegaly, no masses and bowel sounds normal, small bulge centrally at base of sternum and just under when incision from CABG is noted.    MS: no gross musculoskeletal defects noted, no edema  SKIN: no suspicious lesions or rashes  NEURO: Normal strength and tone, mentation intact and speech normal  PSYCH: mentation appears normal,  affect normal/bright    Xray of coccyx reveals no definitive fractures per my read        Assessment & Plan     1. Incisional hernia, without obstruction or gangrene    At this time- patient understands she is a high-risk candidate for surgery due to her complicated past medical history.  This hernia is a nuisance and risk of repair is too great to outweigh benefit.  Recommend continued attempts at this time to lose weight which will reduce this local pressure.  If continues to enlarge or be symptomatic will reassess prn.    2. Morbid (severe) obesity due to excess calories (H)    Continue to work on diet and exercise and weight loss.    3. Type 2 diabetes mellitus with stage 3 chronic kidney disease, with long-term current use of insulin (H)    - Continuous Blood Gluc  (FREESTYLE MARTY 14 DAY READER) JEZ; 1 Units 4 times daily (before meals and nightly)  Dispense: 1 Device; Refill: 0  - Continuous Blood Gluc Sensor (FREESTYLE MARTY 14 DAY SENSOR) MISC; 1 Units 4 times daily (before meals and nightly)  Dispense: 6 each; Refill: 3    Resent for continuous glucose monitoring- excellent candidate with peripheral neuropathy in fingers on insulin.  PA as needed.    4. Pain in the coccyx    - XR Sacrum and Coccyx 2 Views; Future    Reassured no fracture noted.  Copy of xray for chiropractor. Recommend child's innertube instead of donut pillow to offload pressure at site prn.    5. Mild major depression (H)    Stable on LExapro.    6. Malignant neoplasm of lower-inner quadrant of left breast in female, estrogen receptor positive (H)    Followed by [facility].     Amee Connell MD  [facility]

## 2020-02-28 RX ORDER — LOSARTAN POTASSIUM 50 MG/1
25 TABLET ORAL 2 TIMES DAILY
Qty: 90 TABLET | Refills: 0 | Status: SHIPPED | OUTPATIENT
Start: 2020-02-28 | End: 2020-03-16

## 2020-02-28 ASSESSMENT — ANXIETY QUESTIONNAIRES: GAD7 TOTAL SCORE: 2

## 2020-02-28 NOTE — TELEPHONE ENCOUNTER
Called Mariel to review blood pressures. She reports she gets it checked twice a week when she gets her back adjusted. Remembers one of the readings this week and it was 105/50. Asked if she was dizzy or lightheaded and she said yes, but that has been ongoing for a long time and isn't new. It isn't any better or worse.   Her Cardiomems readings are as follows (Goal PA Diastolic 14-18). Routing to provider for review.

## 2020-02-28 NOTE — TELEPHONE ENCOUNTER
Reviewed with Yuni SCHAEFER.  Called Mariel with following recommendations:    Date: 2/28/2020    Time of Call: 2:38 PM     Diagnosis:  Heart failure     [ VORB ] Ordering provider: Yuni SCHAEFER  Order: Decrease Losartan to 25 mg BID.      Order received by: Rosina Mays RN      Follow-up/additional notes: reviewed with Mariel who verbalized understanding. Asked her to take BP readings for next 2 weeks and I will call for numbers. She verbalized understanding.

## 2020-02-28 NOTE — PROGRESS NOTES
Remote monitoring of Pulmonary Artery Pressures via CardioMEMS device reviewed at least weekly and as needed with CORE nursing. Diuretics adjusted accordingly.

## 2020-03-05 ENCOUNTER — PATIENT OUTREACH (OUTPATIENT)
Dept: CARDIOLOGY | Facility: CLINIC | Age: 75
End: 2020-03-05

## 2020-03-05 ENCOUNTER — TELEPHONE (OUTPATIENT)
Dept: FAMILY MEDICINE | Facility: CLINIC | Age: 75
End: 2020-03-05

## 2020-03-05 DIAGNOSIS — M53.3 PAIN IN THE COCCYX: Primary | ICD-10-CM

## 2020-03-05 NOTE — TELEPHONE ENCOUNTER
Reason for Call:  Other call back    Detailed comments: Patient is wondering if there is a stronger medication she can take for her back pain stemming from her fall. States she is having no relief from 2 Tylenol. Please advise. Thanks!    Phone Number Patient can be reached at: [phone]    Best Time: Any    Can we leave a detailed message on this number? YES    Call taken on 3/5/2020 at 11:39 AM by Korin Castro

## 2020-03-05 NOTE — TELEPHONE ENCOUNTER
[facility] - CardioMEMS Reading Review      CardioMEMS reviewed and routed to patient's provider, Yuni SCHAEFER.   Current diuretic: Bumex 4/2/2 mg  Recent plan of care changes: none    Current Threshold parameters:       Today's Waveform:       Readings:           [facility] Date Wedge Pressure MEMS PAD during cath  (average of the 3 values)     7/1/2019 w/MEMS implant     20 mmHg   18 mmHg           Rosina Mays RN

## 2020-03-05 NOTE — TELEPHONE ENCOUNTER
Called Mariel and communicated following orders:    Date: 3/5/2020    Time of Call: 1:34 PM     Diagnosis:  HFpEF     [ VORB ] Ordering provider: Yuni SCHAEFER  Order: Increase Bumex today only to 4mg/4mg/2mg. Take extra 20 mEq Potassium.      Order received by: Rosina Mays RN      Follow-up/additional notes: communicated to Mariel who verbalized understanding.

## 2020-03-06 ENCOUNTER — PATIENT OUTREACH (OUTPATIENT)
Dept: CARE COORDINATION | Facility: CLINIC | Age: 75
End: 2020-03-06
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
## 2020-03-06 RX ORDER — NAPROXEN 500 MG/1
500 TABLET ORAL 2 TIMES DAILY WITH MEALS
Qty: 60 TABLET | Refills: 1 | Status: CANCELLED | OUTPATIENT
Start: 2020-03-06

## 2020-03-06 NOTE — PROGRESS NOTES
Clinic Care Coordination Contact    Follow Up Progress Note      Assessment: received call from patient asking for assistance obtaining stronger pain medication for coccyx and back pain from fall that occurred 3 weeks ago. Fell on her coccyx while trying to avoid tripping over her dog. Carries [employer] phone to call for help if needed. Was able to get up on her own. Has seen PCP and chiropractor for care since the fall. Using 1000 mg tylenol three times daily but still having breakthrough pain. She called the clinic and the triage nurse sent a message to PCP as patient is requesting to try naprosyn. Chart reviewed and patient is noted to have CKD 4. She states she was never told that this has worsened and thought she still had CKD 3. Reviewed with patient that her endocrinology provider changed this in December and referred patient to nephrology for care. Patient did not recall receiving this information but agrees to take the number and call the clinic to schedule an appointment. PCP asks if patient has tried tramadol or similar in the past. Per chart review patient was prescribed tramadol last Fall but she doesn't remember if she used this or not. She was prescribed capcaisin cream in the past but also isn't sure if she tried this. Suggested she call pharmacy to see if a refill is still available as I suspect it would be helpful for coccyx pain.      3-Goal Statement: I will increase my physical activity  Measure of Success: increased strength, weight loss  Supportive Steps to Achieve: referred to [facility] for safety  Barriers: history of falls; CHF  Strengths: wants to improve  Date to Achieve By: 8-1-2020  Patient expressed understanding of goal: yes    Intervention/Education provided during outreach: message with update sent to PCP for decision     Plan:     Will follow up with patient in 2-4 weeks to ensure she has scheduled with and/or seen nephrology

## 2020-03-06 NOTE — TELEPHONE ENCOUNTER
She fell and hurt her tailbone and is wonder if she can get some naproxen to try before she tries a prescription pain med. Sign off on script if ok.    thanks

## 2020-03-06 NOTE — TELEPHONE ENCOUNTER
With her allergies can you [place] back with her and ask what med she has tolerated in the past-- T#3 or Tramadol??

## 2020-03-06 NOTE — TELEPHONE ENCOUNTER
Patient called me with same request. Currently taking 1000 mg tylenol three times daily. Tramadol is on her medication list as prescribed last November but she doesn't remember if she took it or not. I asked her to call [facility] and see if there is a refill for capcaisin cream she could try. Chart reviewed and she is CKD 4 so may need alternative to naprosyn. I did re-refer her to nephrology as ordered by her endocrinologist in December.     Vibha Carvajal RN  [facility]-Care Coordination  [facility]  [facility]  [phone]

## 2020-03-13 ENCOUNTER — PATIENT OUTREACH (OUTPATIENT)
Dept: CARDIOLOGY | Facility: CLINIC | Age: 75
End: 2020-03-13

## 2020-03-13 DIAGNOSIS — I50.32 CHRONIC DIASTOLIC HEART FAILURE (H): Chronic | ICD-10-CM

## 2020-03-13 NOTE — TELEPHONE ENCOUNTER
[facility] - CardioMEMS Reading Review      CardioMEMS reviewed and routed to patient's provider, Yuni Hall.   Current diuretic: Bumex 4/2/2  mg  Recent plan of care changes: on 3/5 only increased one dose.   Spoke with Mariel as she was due for phone BP check after decreasing Losartan for low diastolic BP.   Her readings over the last week (in the AM before her meds):  149/46  103/43  103/43  140/54  135/53  127/51    She reports her usual dizziness intermittently but this has been going on for a long time and is unchanged. She reports some SOB with exertion at times. I told her her last mems reading was in a good range. Encouraged more frequent mems readings and we will review them early next week.         Current Threshold parameters:       Today's Waveform:        Readings:           [facility] Date Wedge Pressure MEMS PAD during cath  (average of the 3 values)   7/1/2019  w/MEMS implant     20 mmHg   18 mmHg           Rosina Mays RN

## 2020-03-15 ENCOUNTER — HEALTH MAINTENANCE LETTER (OUTPATIENT)
Age: 75
End: 2020-03-15

## 2020-03-16 RX ORDER — LOSARTAN POTASSIUM 50 MG/1
25 TABLET ORAL 2 TIMES DAILY
Qty: 90 TABLET | Refills: 0 | Status: SHIPPED | OUTPATIENT
Start: 2020-03-16 | End: 2020-06-17

## 2020-03-16 NOTE — PROGRESS NOTES
Reviewed with Yuni SCHAEFER. Called Mariel back with following recommendations:    Date: 3/16/2020    Time of Call: 2:52 PM     Diagnosis:  HFpEF     VORB: Ordering provider: Yuni SCHAEFER   Order: Decrease Losartan to 25 mg BID. LAbs in 2 weeks at clinic visit.      Order received by: Rosina Mays RN      Follow-up/additional notes: reviewed with Mariel who verbalized understanding.

## 2020-03-19 ENCOUNTER — PATIENT OUTREACH (OUTPATIENT)
Dept: CARDIOLOGY | Facility: CLINIC | Age: 75
End: 2020-03-19

## 2020-03-19 ENCOUNTER — ALLIED HEALTH/NURSE VISIT (OUTPATIENT)
Dept: CARDIOLOGY | Facility: CLINIC | Age: 75
End: 2020-03-19
Attending: PHYSICIAN ASSISTANT
Payer: MEDICARE

## 2020-03-19 DIAGNOSIS — I50.32 CHRONIC DIASTOLIC HEART FAILURE (H): Primary | Chronic | ICD-10-CM

## 2020-03-19 NOTE — Clinical Note
Charges have been dropped for CardioMems billing. Please document and sign visit.   Flaco Foreman, [facility] Heart Failure Admin/Referrals

## 2020-03-19 NOTE — PROGRESS NOTES
[facility] - CardioMEMS Reading Review      CardioMEMS reviewed and routed to patient's provider, Yuni SCHAEFER.   Current diuretic: Bumex 4/2/2  mg  Recent plan of care changes: none     Current Threshold parameters:       Today's Waveform:       Readings:           [facility] Date Wedge Pressure MEMS PAD during cath  (average of the 3 values)     7/1/2019 w/MEMS implant     20 mmHg   18 mmHg           Rosina Mays RN

## 2020-03-25 ENCOUNTER — HOSPITAL ENCOUNTER (OUTPATIENT)
Dept: CT IMAGING | Facility: CLINIC | Age: 75
Discharge: HOME | End: 2020-03-25
Attending: NURSE PRACTITIONER | Admitting: NURSE PRACTITIONER
Payer: MEDICARE

## 2020-03-25 ENCOUNTER — OFFICE VISIT (OUTPATIENT)
Dept: PEDIATRICS | Facility: CLINIC | Age: 75
End: 2020-03-25
Payer: MEDICARE

## 2020-03-25 VITALS
OXYGEN SATURATION: 97 % | DIASTOLIC BLOOD PRESSURE: 68 MMHG | TEMPERATURE: 97.9 F | HEART RATE: 59 BPM | RESPIRATION RATE: 16 BRPM | SYSTOLIC BLOOD PRESSURE: 157 MMHG

## 2020-03-25 DIAGNOSIS — R10.32 LEFT LOWER QUADRANT PAIN: ICD-10-CM

## 2020-03-25 DIAGNOSIS — K57.32 DIVERTICULITIS OF COLON: Primary | ICD-10-CM

## 2020-03-25 DIAGNOSIS — R10.32 LEFT LOWER QUADRANT PAIN: Primary | ICD-10-CM

## 2020-03-25 LAB
ALBUMIN SERPL-MCNC: 3.4 G/DL (ref 3.4–5)
ALBUMIN UR-MCNC: NEGATIVE MG/DL
ALP SERPL-CCNC: 97 U/L (ref 40–150)
ALT SERPL W P-5'-P-CCNC: 17 U/L (ref 0–50)
AMYLASE SERPL-CCNC: 84 U/L (ref 30–110)
ANION GAP SERPL CALCULATED.3IONS-SCNC: 5 MMOL/L (ref 3–14)
APPEARANCE UR: ABNORMAL
AST SERPL W P-5'-P-CCNC: 14 U/L (ref 0–45)
BACTERIA #/AREA URNS HPF: ABNORMAL /HPF
BASOPHILS # BLD AUTO: 0 10E9/L (ref 0–0.2)
BASOPHILS NFR BLD AUTO: 0.2 %
BILIRUB SERPL-MCNC: 0.4 MG/DL (ref 0.2–1.3)
BILIRUB UR QL STRIP: NEGATIVE
BUN SERPL-MCNC: 43 MG/DL (ref 7–30)
CALCIUM SERPL-MCNC: 9.5 MG/DL (ref 8.5–10.1)
CHLORIDE SERPL-SCNC: 106 MMOL/L (ref 94–109)
CO2 SERPL-SCNC: 29 MMOL/L (ref 20–32)
COLOR UR AUTO: ABNORMAL
CREAT SERPL-MCNC: 1.68 MG/DL (ref 0.52–1.04)
DIFFERENTIAL METHOD BLD: ABNORMAL
EOSINOPHIL # BLD AUTO: 0.1 10E9/L (ref 0–0.7)
EOSINOPHIL NFR BLD AUTO: 1.5 %
ERYTHROCYTE [DISTWIDTH] IN BLOOD BY AUTOMATED COUNT: 12.6 % (ref 10–15)
GFR SERPL CREATININE-BSD FRML MDRD: 30 ML/MIN/{1.73_M2}
GLUCOSE SERPL-MCNC: 202 MG/DL (ref 70–99)
GLUCOSE UR STRIP-MCNC: NEGATIVE MG/DL
HCT VFR BLD AUTO: 37.6 % (ref 35–47)
HGB BLD-MCNC: 11.8 G/DL (ref 11.7–15.7)
HGB UR QL STRIP: ABNORMAL
IMM GRANULOCYTES # BLD: 0 10E9/L (ref 0–0.4)
IMM GRANULOCYTES NFR BLD: 0.3 %
KETONES UR STRIP-MCNC: NEGATIVE MG/DL
LEUKOCYTE ESTERASE UR QL STRIP: ABNORMAL
LIPASE SERPL-CCNC: 371 U/L (ref 73–393)
LYMPHOCYTES # BLD AUTO: 1.4 10E9/L (ref 0.8–5.3)
LYMPHOCYTES NFR BLD AUTO: 15.6 %
MCH RBC QN AUTO: 29.4 PG (ref 26.5–33)
MCHC RBC AUTO-ENTMCNC: 31.4 G/DL (ref 31.5–36.5)
MCV RBC AUTO: 94 FL (ref 78–100)
MONOCYTES # BLD AUTO: 0.6 10E9/L (ref 0–1.3)
MONOCYTES NFR BLD AUTO: 6.8 %
MUCOUS THREADS #/AREA URNS LPF: PRESENT /LPF
NEUTROPHILS # BLD AUTO: 6.8 10E9/L (ref 1.6–8.3)
NEUTROPHILS NFR BLD AUTO: 75.6 %
NITRATE UR QL: NEGATIVE
NRBC # BLD AUTO: 0 10*3/UL
NRBC BLD AUTO-RTO: 0 /100
PH UR STRIP: 5.5 PH (ref 5–7)
PLATELET # BLD AUTO: 100 10E9/L (ref 150–450)
POTASSIUM SERPL-SCNC: 3.9 MMOL/L (ref 3.4–5.3)
PROT SERPL-MCNC: 6.7 G/DL (ref 6.8–8.8)
RBC # BLD AUTO: 4.02 10E12/L (ref 3.8–5.2)
RBC #/AREA URNS AUTO: 3 /HPF (ref 0–2)
SODIUM SERPL-SCNC: 140 MMOL/L (ref 133–144)
SOURCE: ABNORMAL
SP GR UR STRIP: 1.01 (ref 1–1.03)
SQUAMOUS #/AREA URNS AUTO: 2 /HPF (ref 0–1)
UROBILINOGEN UR STRIP-MCNC: NORMAL MG/DL (ref 0–2)
WBC # BLD AUTO: 8.9 10E9/L (ref 4–11)
WBC #/AREA URNS AUTO: >182 /HPF (ref 0–5)
WBC CLUMPS #/AREA URNS HPF: PRESENT /HPF

## 2020-03-25 PROCEDURE — 81001 URINALYSIS AUTO W/SCOPE: CPT | Performed by: NURSE PRACTITIONER

## 2020-03-25 PROCEDURE — 85025 COMPLETE CBC W/AUTO DIFF WBC: CPT | Performed by: NURSE PRACTITIONER

## 2020-03-25 PROCEDURE — 36415 COLL VENOUS BLD VENIPUNCTURE: CPT | Performed by: NURSE PRACTITIONER

## 2020-03-25 PROCEDURE — 99207 REFERRAL TO ACUTE AND DIAGNOSTIC SERVICES: CPT | Performed by: FAMILY MEDICINE

## 2020-03-25 PROCEDURE — 74176 CT ABD & PELVIS W/O CONTRAST: CPT

## 2020-03-25 PROCEDURE — 82150 ASSAY OF AMYLASE: CPT | Performed by: NURSE PRACTITIONER

## 2020-03-25 PROCEDURE — 83690 ASSAY OF LIPASE: CPT | Performed by: NURSE PRACTITIONER

## 2020-03-25 PROCEDURE — 99215 OFFICE O/P EST HI 40 MIN: CPT | Performed by: NURSE PRACTITIONER

## 2020-03-25 PROCEDURE — 80053 COMPREHEN METABOLIC PANEL: CPT | Performed by: NURSE PRACTITIONER

## 2020-03-25 RX ORDER — METRONIDAZOLE 500 MG/1
500 TABLET ORAL 3 TIMES DAILY
Qty: 21 TABLET | Refills: 0 | Status: SHIPPED | OUTPATIENT
Start: 2020-03-25 | End: 2020-04-16

## 2020-03-25 RX ORDER — CIPROFLOXACIN 500 MG/1
500 TABLET, FILM COATED ORAL 2 TIMES DAILY
Qty: 20 TABLET | Refills: 0 | Status: SHIPPED | OUTPATIENT
Start: 2020-03-25 | End: 2020-04-16

## 2020-03-25 ASSESSMENT — PAIN SCALES - GENERAL: PAINLEVEL: WORST PAIN (10)

## 2020-03-25 NOTE — PATIENT INSTRUCTIONS
Patient Education     Diverticulitis    Some people get pouches along the wall of the colon as they get older. The pouches, called diverticuli, usually cause no symptoms. If the pouches become blocked, you can get an infection. This infection is called diverticulitis. It causes pain in your lower abdomen and fever. If not treated, it can become a serious condition, causing an abscess to form inside the pouch. The abscess may block the intestinal tract even or rupture, spreading infection throughout the abdomen.  When treatment is started early, oral antibiotics alone may be enough to cure diverticulitis. This method is tried first. But, if you don't improve or if your condition gets worse while using oral antibiotics, you may need to be admitted to the hospital for IV antibiotics. Severe cases may require surgery.  Home care  The following guidelines will help you care for yourself at home:    During the acute illness, rest and follow your healthcare provider's instructions about diet. Sometimes you will need to follow a clear liquid diet to rest your bowel. Once your symptoms are better, you may be told to follow a low-fiber diet for some time. Include foods like:  ? Flake cereal, mashed potatoes, pancakes, waffles, pasta, white bread, rice, applesauce, bananas, eggs, fish, poultry, tofu, and cooked soft vegetables    Take antibiotics exactly as instructed. Don't miss any doses or stop taking the medication, even if you feel better.    Monitor your temperature and tell your healthcare provider if you have rising temperatures.  Preventing future attacks  Once you have an episode of diverticulitis, you are at risk for having it again. After you have recovered from this episode, you may be able to lower your risk by eating a high-fiber diet (20 gm/day to 35 gm/day of fiber). This cleans out the colon pouches that already exist and may prevent new ones from forming. Foods high in fiber include fresh fruits and edible  peelings, raw or lightly cooked vegetables, whole grain cereals and breads, dried beans and peas, and bran.  Other steps that can help prevent future attacks include:    Take your medicines, such as antibiotics, as your healthcare provider says.    Drink 6 to 8 glasses of water every day, unless told otherwise.    Use a heating pad or hot water bottle to help abdominal cramping or pain.    Begin an exercise program. Ask your healthcare provider how to get started. You can benefit from simple activities such as walking or gardening.    Treat diarrhea with a bland diet. Start with liquids only; then slowly add fiber over time.    Watch for changes in your bowel movements (constipation to diarrhea). Avoid constipation by eating a high fiber diet and taking a stool softener if needed.    Get plenty of rest and sleep.  Follow-up care  Follow up with your healthcare provider as advised or sooner if you are not getting better in the next 2 days.  When to seek medical advice  Call your healthcare provider right away if any of these occur:    Fever of 100.4 F (38 C) or higher, or as directed by your healthcare provider    Repeated vomiting or swelling of the abdomen    Weakness, dizziness, light-headedness    Pain in your abdomen that gets worse, severe, or spreads to your back    Pain that moves to the right lower abdomen    Rectal bleeding (stools that are red, black or maroon color)    Unexpected vaginal bleeding  Date Last Reviewed: 9/1/2016 2000-2019 [employer] [address] [place] [address], [place] [address]. All rights reserved. This information is not intended as a substitute for professional medical care. Always follow your healthcare professional's instructions.

## 2020-03-25 NOTE — PROGRESS NOTES
Subjective     Mariel Ribeiro is a 74 year old female who presents urgently to [facility] clinic today by referral from Chantal Connell MD for acute severe LLQ abdominal pain for 3-4 days to r/o appendicitis:    HPI   LLQ Abdominal Pain x 3-4 days  Duration of complaint: 3/21/2020   Had a bowel movement on Sunday, and thought would help, but did not relieve pain.   Some nausea, no vomiting.   Milk of magnesia was suggested by chiropractor 3/24/2020.   Not noticing any difference.  Has had decreased appetite.    No fevers or chills.          Reviewed and updated as needed this visit by Provider  Tobacco  Allergies  Meds  Problems  Med Hx  Surg Hx  Fam Hx         Review of Systems   ROS COMP: Constitutional, HEENT, cardiovascular, pulmonary, GI, , musculoskeletal, neuro, skin, endocrine and psych systems are negative, except as otherwise noted.      Objective    BP (!) 157/68   Pulse 59   Temp 97.9  F (36.6  C) (Oral)   Resp 16   SpO2 97%   There is no height or weight on file to calculate BMI.  Physical Exam   GENERAL: healthy, alert and no distress  RESP: lungs clear to auscultation - no rales, rhonchi or wheezes  CV: regular rate and rhythm, normal S1 S2, no S3 or S4, no murmur, click or rub, no peripheral edema and peripheral pulses strong  ABDOMEN: tenderness LLQ and no palpable or pulsatile masses  MS: no gross musculoskeletal defects noted, no edema    Results for orders placed or performed during the hospital encounter of 03/25/20   CT Abdomen Pelvis w/o Contrast     Status: None    Narrative    CT ABDOMEN AND PELVIS WITHOUT CONTRAST March 25, 2020 2:29 PM     HISTORY: Abdomen pain, acute, generalized. Left lower quadrant pain.    COMPARISON: October 6, 2010.    TECHNIQUE: Volumetric helical acquisition of CT images from the lung  bases through the symphysis pubis were acquired without contrast.  Coronal images reconstructed from axial image data. Radiation dose for  this scan was reduced using  automated exposure control, adjustment of  the mA and/or kV according to patient size, or iterative  reconstruction technique.    FINDINGS: There is inflammation around the sigmoid colon consistent  with acute diverticulitis. No evidence for intraperitoneal free air or  abscess formation. Mild-moderate diverticulosis. Pelvic structures  unremarkable. Unremarkable appendix. There are moderate  atherosclerotic changes of the visualized aorta and its branches.  There is no evidence of aortic aneurysm. No hydronephrosis.  Cholecystectomy change. No definite focal liver lesions. The liver,  spleen, adrenal glands, kidneys, and pancreas demonstrate no worrisome  focal lesion in the absence of intravenous contrast. Lung bases are  clear. No pleural or pericardial effusion. Bone windows reveal no  suspicious lesions. No free air in the abdomen. There are no abdominal  or pelvic lymph nodes that are abnormal by size criteria. There are no  dilated loops of small intestine or large bowel to suggest ileus or  obstruction.      Impression    IMPRESSION: Acute sigmoid diverticulitis. No evidence for  intraperitoneal free air or abscess formation.     BLADIMIR CHRISTOPHER MD   UA reflex to Microscopic     Status: Abnormal   Result Value Ref Range    Color Urine Light Yellow     Appearance Urine Slightly Cloudy     Glucose Urine Negative NEG^Negative mg/dL    Bilirubin Urine Negative NEG^Negative    Ketones Urine Negative NEG^Negative mg/dL    Specific Gravity Urine 1.010 1.003 - 1.035    Blood Urine Trace (A) NEG^Negative    pH Urine 5.5 5.0 - 7.0 pH    Protein Albumin Urine Negative NEG^Negative mg/dL    Urobilinogen mg/dL Normal 0.0 - 2.0 mg/dL    Nitrite Urine Negative NEG^Negative    Leukocyte Esterase Urine Large (A) NEG^Negative    Source Unspecified Urine     RBC Urine 3 (H) 0 - 2 /HPF    WBC Urine >182 (H) 0 - 5 /HPF    WBC Clumps Present (A) NEG^Negative /HPF    Bacteria Urine Moderate (A) NEG^Negative /HPF    Squamous  Epithelial /HPF Urine 2 (H) 0 - 1 /HPF    Mucous Urine Present (A) NEG^Negative /LPF   Results for orders placed or performed in visit on 03/25/20   Amylase     Status: None   Result Value Ref Range    Amylase 84 30 - 110 U/L   CBC with platelets differential     Status: Abnormal   Result Value Ref Range    WBC 8.9 4.0 - 11.0 10e9/L    RBC Count 4.02 3.8 - 5.2 10e12/L    Hemoglobin 11.8 11.7 - 15.7 g/dL    Hematocrit 37.6 35.0 - 47.0 %    MCV 94 78 - 100 fl    MCH 29.4 26.5 - 33.0 pg    MCHC 31.4 (L) 31.5 - 36.5 g/dL    RDW 12.6 10.0 - 15.0 %    Platelet Count 100 (L) 150 - 450 10e9/L    Diff Method Automated Method     % Neutrophils 75.6 %    % Lymphocytes 15.6 %    % Monocytes 6.8 %    % Eosinophils 1.5 %    % Basophils 0.2 %    % Immature Granulocytes 0.3 %    Nucleated RBCs 0 0 /100    Absolute Neutrophil 6.8 1.6 - 8.3 10e9/L    Absolute Lymphocytes 1.4 0.8 - 5.3 10e9/L    Absolute Monocytes 0.6 0.0 - 1.3 10e9/L    Absolute Eosinophils 0.1 0.0 - 0.7 10e9/L    Absolute Basophils 0.0 0.0 - 0.2 10e9/L    Abs Immature Granulocytes 0.0 0 - 0.4 10e9/L    Absolute Nucleated RBC 0.0    Comprehensive metabolic panel     Status: Abnormal   Result Value Ref Range    Sodium 140 133 - 144 mmol/L    Potassium 3.9 3.4 - 5.3 mmol/L    Chloride 106 94 - 109 mmol/L    Carbon Dioxide 29 20 - 32 mmol/L    Anion Gap 5 3 - 14 mmol/L    Glucose 202 (H) 70 - 99 mg/dL    Urea Nitrogen 43 (H) 7 - 30 mg/dL    Creatinine 1.68 (H) 0.52 - 1.04 mg/dL    GFR Estimate 30 (L) >60 mL/min/[1.73_m2]    GFR Estimate If Black 34 (L) >60 mL/min/[1.73_m2]    Calcium 9.5 8.5 - 10.1 mg/dL    Bilirubin Total 0.4 0.2 - 1.3 mg/dL    Albumin 3.4 3.4 - 5.0 g/dL    Protein Total 6.7 (L) 6.8 - 8.8 g/dL    Alkaline Phosphatase 97 40 - 150 U/L    ALT 17 0 - 50 U/L    AST 14 0 - 45 U/L   Lipase     Status: None   Result Value Ref Range    Lipase 371 73 - 393 U/L               Assessment & Plan       ICD-10-CM    1. Diverticulitis of colon  K57.32 ciprofloxacin  (CIPRO) 500 MG tablet     metroNIDAZOLE (FLAGYL) 500 MG tablet   2. Left lower quadrant pain  R10.32 Amylase     CBC with platelets differential     Comprehensive metabolic panel     Lipase     Referral to Acute and Diagnostic Services (Day of diagnostic / First order acute)     CT Abdomen Pelvis w/o Contrast     CANCELED: *UA reflex to Microscopic     CANCELED: CT Abdomen w/o Contrast        Patient Instructions     Patient Education     Diverticulitis    Some people get pouches along the wall of the colon as they get older. The pouches, called diverticuli, usually cause no symptoms. If the pouches become blocked, you can get an infection. This infection is called diverticulitis. It causes pain in your lower abdomen and fever. If not treated, it can become a serious condition, causing an abscess to form inside the pouch. The abscess may block the intestinal tract even or rupture, spreading infection throughout the abdomen.  When treatment is started early, oral antibiotics alone may be enough to cure diverticulitis. This method is tried first. But, if you don't improve or if your condition gets worse while using oral antibiotics, you may need to be admitted to the hospital for IV antibiotics. Severe cases may require surgery.  Home care  The following guidelines will help you care for yourself at home:    During the acute illness, rest and follow your healthcare provider's instructions about diet. Sometimes you will need to follow a clear liquid diet to rest your bowel. Once your symptoms are better, you may be told to follow a low-fiber diet for some time. Include foods like:  ? Flake cereal, mashed potatoes, pancakes, waffles, pasta, white bread, rice, applesauce, bananas, eggs, fish, poultry, tofu, and cooked soft vegetables    Take antibiotics exactly as instructed. Don't miss any doses or stop taking the medication, even if you feel better.    Monitor your temperature and tell your healthcare provider if you  have rising temperatures.  Preventing future attacks  Once you have an episode of diverticulitis, you are at risk for having it again. After you have recovered from this episode, you may be able to lower your risk by eating a high-fiber diet (20 gm/day to 35 gm/day of fiber). This cleans out the colon pouches that already exist and may prevent new ones from forming. Foods high in fiber include fresh fruits and edible peelings, raw or lightly cooked vegetables, whole grain cereals and breads, dried beans and peas, and bran.  Other steps that can help prevent future attacks include:    Take your medicines, such as antibiotics, as your healthcare provider says.    Drink 6 to 8 glasses of water every day, unless told otherwise.    Use a heating pad or hot water bottle to help abdominal cramping or pain.    Begin an exercise program. Ask your healthcare provider how to get started. You can benefit from simple activities such as walking or gardening.    Treat diarrhea with a bland diet. Start with liquids only; then slowly add fiber over time.    Watch for changes in your bowel movements (constipation to diarrhea). Avoid constipation by eating a high fiber diet and taking a stool softener if needed.    Get plenty of rest and sleep.  Follow-up care  Follow up with your healthcare provider as advised or sooner if you are not getting better in the next 2 days.  When to seek medical advice  Call your healthcare provider right away if any of these occur:    Fever of 100.4 F (38 C) or higher, or as directed by your healthcare provider    Repeated vomiting or swelling of the abdomen    Weakness, dizziness, light-headedness    Pain in your abdomen that gets worse, severe, or spreads to your back    Pain that moves to the right lower abdomen    Rectal bleeding (stools that are red, black or maroon color)    Unexpected vaginal bleeding  Date Last Reviewed: 9/1/2016 2000-2019 [employer] [address] [facility],  [place] [address]. All rights reserved. This information is not intended as a substitute for professional medical care. Always follow your healthcare professional's instructions.           Start both antibiotics tonight.    Advised to follow soft and bland diet.  Push fluids to flush system.     Advised to avoid seeds, nuts and skins until acute diverticulitis has resolved.    Will f/u with PCP via phone tomorrow.      See Patient Instructions    No follow-ups on file.    CHANTAL Pierce [facility]

## 2020-03-27 ENCOUNTER — ALLIED HEALTH/NURSE VISIT (OUTPATIENT)
Dept: NURSING | Facility: CLINIC | Age: 75
End: 2020-03-27
Payer: MEDICARE

## 2020-03-27 ENCOUNTER — VIRTUAL VISIT (OUTPATIENT)
Dept: FAMILY MEDICINE | Facility: CLINIC | Age: 75
End: 2020-03-27
Payer: MEDICARE

## 2020-03-27 ENCOUNTER — PATIENT OUTREACH (OUTPATIENT)
Dept: CARDIOLOGY | Facility: CLINIC | Age: 75
End: 2020-03-27

## 2020-03-27 ENCOUNTER — PATIENT OUTREACH (OUTPATIENT)
Dept: CARE COORDINATION | Facility: CLINIC | Age: 75
End: 2020-03-27

## 2020-03-27 DIAGNOSIS — R29.898 BILATERAL LEG WEAKNESS: Primary | ICD-10-CM

## 2020-03-27 DIAGNOSIS — K57.32 DIVERTICULITIS OF COLON: ICD-10-CM

## 2020-03-27 DIAGNOSIS — R10.32 LEFT LOWER QUADRANT PAIN: Primary | ICD-10-CM

## 2020-03-27 DIAGNOSIS — Z53.9 ERRONEOUS ENCOUNTER--DISREGARD: Primary | ICD-10-CM

## 2020-03-27 PROCEDURE — G2012 BRIEF CHECK IN BY MD/QHP: HCPCS | Performed by: NURSE PRACTITIONER

## 2020-03-27 NOTE — PROGRESS NOTES
Clinic Care Coordination Contact  [facility]/Voicemail       Clinical Data: Care Coordinator Outreach  Outreach attempted x 1.  Left message on patient's voicemail with call back information and requested return call.  Plan: Care Coordinator will try to reach patient again in approximately 10 business days.    Vibha Carvajal RN  [facility]-Care Coordination  [facility]  [facility]  [phone]

## 2020-03-27 NOTE — TELEPHONE ENCOUNTER
Cardiomems Reviewed. Goal PA Diastolic of 14-18. Recent readings as follows:    PA Diastolic  3/22 16  3/23 17  3/24 16  3/25 17   3/26 19    Routing to provider for review. Rsoina Mays RN

## 2020-03-27 NOTE — PROGRESS NOTES
TELEHEALTH VISIT- Video declined.  Entire visit done over phone patient initiated for [facility] f/u of diverticulitis  Patient location home  Provider location home    HPI   Was seen at [facility] clinic 2 days ago and diagnosed with acute diverticulitis  Was started on cipro and flagyl.  Has and 3 doses of each.   Reports pains same to slightly better.    Rating pain +5-7/10.    Has not had a BM yet.    Passing gas normally.    Trying to eat a bland/soft diet.    No fevers or chills  No nausea or vomiting  Tolerated squash soup, toast, peaches, turkey slices and broth.       Reviewed and updated as needed this visit by Provider  I have reviewed the patient's Past Medical History, Past Surgical History, Social History, Family History, Problem List and Medication List..         Review of Systems   ROS COMP: Constitutional, HEENT, cardiovascular, pulmonary, GI, , musculoskeletal, neuro, skin, endocrine and psych systems are negative, except as otherwise noted.      Objective    TELEHEALTH VISIT- no vitals taken  Physical Exam   GENERAL: NAD  RESP: speaking in complete sentences, no SOB  NEURO: mentation intact and speech normal  PSYCH: mentation appears normal, affect normal/bright        Assessment & Plan       ICD-10-CM    1. Left lower quadrant pain  R10.32    2. Diverticulitis of colon  K57.32       Advised if pain is gradually improving and afebrile, OK to monitor at home.    Encouraged to monitor stools/gas and foods/intake.    If she should develop fevers/chills/nausea/vomiting or is not passing gas to f/u and ER if worsening.    Discussed risks with worsening diverticulitis including bowel obstruction and or perforation/sepsis.    Advised to continue antibiotics as directed and f/u with PCP next week.      15 minutes in with pt and more than 50% of the time was spent in counseling and coordination of care of the above issues      Ora Villeda RN, CNP  [facility]

## 2020-03-31 ENCOUNTER — VIRTUAL VISIT (OUTPATIENT)
Dept: CARDIOLOGY | Facility: CLINIC | Age: 75
End: 2020-03-31
Attending: PHYSICIAN ASSISTANT
Payer: MEDICARE

## 2020-03-31 DIAGNOSIS — R01.1 SYSTOLIC MURMUR: ICD-10-CM

## 2020-03-31 DIAGNOSIS — N18.30 CKD (CHRONIC KIDNEY DISEASE) STAGE 3, GFR 30-59 ML/MIN (H): Primary | ICD-10-CM

## 2020-03-31 DIAGNOSIS — I25.10 CORONARY ARTERY DISEASE INVOLVING NATIVE CORONARY ARTERY OF NATIVE HEART WITHOUT ANGINA PECTORIS: ICD-10-CM

## 2020-03-31 DIAGNOSIS — I50.32 CHRONIC DIASTOLIC HEART FAILURE (H): Chronic | ICD-10-CM

## 2020-03-31 DIAGNOSIS — R29.6 FALLS FREQUENTLY: ICD-10-CM

## 2020-03-31 PROCEDURE — 99442 ZZC PHYSICIAN TELEPHONE EVALUATION 11-20 MIN: CPT | Performed by: PHYSICIAN ASSISTANT
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
## 2020-03-31 NOTE — PATIENT INSTRUCTIONS
Medications:  - No changes    Follow-up:  - Dr. Wolf in 5 weeks a scheduled  - We will make an appointment for CORE in 2-3 months, this can get moved up if you appointment with Dr. Wolf gets canceled.

## 2020-04-05 NOTE — PROGRESS NOTES
"   04/03/19 0800   Signing Clinician's Name / Credentials   Signing clinician's name / credentials Omar Greene OTR/DOMINIC, MSCS   Session Number   Session Number 4   Additional Session Number 5/2/19 Addendum: OT Outpatient Discharge Summary   Authorization status Med and [employer]   Progress/Recertification   Recertification Due 05/29/19    OT Goal 1   Goal Description Pt will identify at least 3 compensation methods for reduced vision to aid in functional visual tasks such as reading, viewing her phone, etc   Target Date 05/29/19    OT Goal 2   Goal Description Pt will demonstrate functional attentional skills demonstrating understanding of at least 2 compensation techniques she is using at home with ADL/IADL tasks  as measured by  safe  reaction time scores on the [employer] scanboard as precursor to readiness for return to driving   Target Date 05/29/19    OT Goal 3   Goal Description Pt will demonstrate at least 2 methods to compensate for memory and functional recall of at least 90% of information in therapy sessions and from session to session as needed for safe IADL adn report return to 80% of her baseline IADL performance using fall prevention techniques.   Target Date 05/29/19   Subjective Report   Subjective Report This time of day isn't good. I usually get up at eight.  Winded after walking approx 100 feet to OT clinic down [address].. II had a big mood change last week. I'd like to drive again. Do you know when I will be ready to drive again?   Objective Measure 1   Objective Measure CSA   Details 45   Objective Measure 2   Objective Measure Raad Devick Test 1   Details 9 errors in reading #'s wrong, completed in 1'26.5\", moving head vs eyes only-slowed performance with reduced visual acuity (testing with torchierre lamp, overhead lights off, glasses on.)   Self Care/Home Management   Treatment Detail CSA: Identified concussion symptoms that have been longer standing prior to concussion: neck pain, vision changes, " photophobia (but heightened now), dizziness had before( worse now), memory loss  and cognitive symptoms increased since concussion, mood-had issues at baseline (increased since concusion). Pt noted she had suicide ideation last week, had to call roomate to remove knives/scissors from room, called and spoke to her physician, didn't go into doctor but doing better now, issues with dissociative personality per pt. Discussed progress made today with visual scanning, convergence remains unchanged, visual saccades assessed (see above) and pt trained in importance of optometry exam to address challenges with diplopia and vision. she plans to make an appt with Dr. Gaurang Bocanegra today. Pt trained that she can focus on symptom areas that have worsened since concussion to plan for improvement with those. Visual correction may help alot as her Raad Devick score was slow, with poor acuity and this will have effects on reading and comprehension and recall due to slowed visual processing time and need to read things multiple times. REcall 5/5 of items from last OT session using story recall technique. Discussed driving and will plan to assess visual scaning next session.   Self-Care/Home Mgmt/ADL, Compensatory, Meal Prep Minutes (55407) 48 Minutes   Skilled Intervention F/u on recall and functional improvements noted, recall of 5 objects form last session, CSA (see above), and  visual assessment and training.   Patient Response I can read a paragraph and then try to recall it and now doing better. I can read an article and try to remember it in my own words I do better recall it reading it 2 times rather than 3-4 times.   Progress goals progressing   Education   Learner Patient   Readiness Acceptance   Method Explanation;Demonstration;Booklet/handout   Response Verbalizes understanding;Demonstrates understanding   Plan   Plan for next session [employer], memory tasks, alternating foot tap test. distance acuity   Comments    Comments 5/2/19 Addendum: OT Outpatient Discharge Summary: Pt was seen for 4 sessions in OT due to concussion. She is being discharged due to hospitalization and plan to discharge from [facility] to TCU for further rehab. At discharge patient was making some gains with recall of information she was reading with less repetition. Overall Concussion Symptom Assessment scores were down about 7 points from initial evaluation. Visual scanning improved but due to challenges with convergence and saccades it was recommended that she have follow up optometry exam. Goals were not met at time of discharge form OT due to her acute medical change. Omar Wallace, OTR/L.   Total Session Time   Timed Code Treatment Minutes 48   Total Treatment Time (sum of timed and untimed services) 48

## 2020-04-05 NOTE — ADDENDUM NOTE
Encounter addended by: Omar Greene, OT on: 4/5/2020 3:43 PM   Actions taken: Clinical Note Signed, Flowsheet accepted, Episode resolved

## 2020-04-06 ENCOUNTER — PATIENT OUTREACH (OUTPATIENT)
Dept: NURSING | Facility: CLINIC | Age: 75
End: 2020-04-06
Payer: COMMERCIAL

## 2020-04-06 DIAGNOSIS — I50.32 CHRONIC DIASTOLIC HEART FAILURE (H): Primary | ICD-10-CM

## 2020-04-06 DIAGNOSIS — K57.32 DIVERTICULITIS OF COLON: ICD-10-CM

## 2020-04-06 ASSESSMENT — ACTIVITIES OF DAILY LIVING (ADL): DEPENDENT_IADLS:: SHOPPING

## 2020-04-06 NOTE — PATIENT INSTRUCTIONS
Patient Education   Diet for Diverticular Disease  What is diverticular disease?   When the inner wall of your colon weakens and forms small pouches, you have diverticulosis. For most people, this causes no symptoms.   If the pouches become inflamed or infected, you have diverticulitis. Warning signs may include pain in the lower abdomen, chills and vomiting (throwing up).  These conditions are called diverticular disease.  What causes it?  The cause has been linked to many years of eating too little fiber. People who eat plenty of whole grains, fresh fruits and vegetables are less likely to get this disease.   Once the pouches have formed, there is no way to reverse them.   How much fiber should I get?  If you're healing from diverticulitis or surgery to remove the inflamed area, you will at first follow a low-fiber diet (less than 10 grams each day). Then, as your doctor advises, you may slowly add fiber. Increase your intake by 1 to 2 grams a day. Your goal will be 25 to 30 grams a day.   To avoid future problems, continue to get 25 to 30 grams of fiber each day.   How can fiber help?   A high-fiber diet will help you have regular bowel movements. Fiber adds bulk to the stool and helps it pass more easily. Fiber also helps prevent the pouches from growing larger or getting infected.  In the past, doctors have warned patients to avoid eating nuts, seeds and popcorn. They believed these foods could get caught in the pouches and inflame them. But recent studies do not support this idea.   Please ask your dietitian for the handout Fiber Content in [employer]. It will show you how to get more fiber in your diet.  For informational purposes only. Not to replace the advice of your health care provider.   Copyright   2007 [employer]. All rights reserved. [place] [id number] - REV 09/15.

## 2020-04-06 NOTE — TELEPHONE ENCOUNTER
Clinic Care Coordination Contact    Follow Up Progress Note      Assessment: pro-active outreach call placed to patient. 3/31/2020 cardiology visit reviewed. Cardiology clinic and [facility] clinic manage heart failure. Did review need to weigh self daily and she admits she sometimes forgets. No changes made during visit. Still with coccyx pain from what chiropractor suspects may be a fracture from a fall several weeks ago. Now sitting on pillows and bought a 3 inch thick mattress pad for her bed which is helping with pain. She is using ice, heat and homeopathic topical agent which she says does help. Walking makes her pain worse and she is frustrated that she can't walk farther than she can. Tries to walk from the back of the house to the front using her cane for exercise but notes due to COVID-19 outreach her roommate now works from home and is on the phone with clients and she doesn't want to disturb her. Goals discussed and she would like to get back to her [employer] bike for exercise. Reviewed recent diverticulitis flare and questions she has about what she can and cannot eat. Still with diarrhea and wonders when this will resolve    Goals addressed this encounter:   Goals Addressed                 This Visit's Progress      COMPLETED: Increase physical activity        3-Goal Statement: I will increase my physical activity  Measure of Success: increased strength, weight loss  Supportive Steps to Achieve: referred to [facility] for [facility]  Barriers: history of falls; CHF  Strengths: wants to improve  Date to Achieve By: 8-1-2020  Patient expressed understanding of goal: yes            Increase physical activity (pt-stated)        1-Goal Statement: I want to return to riding my [employer] bike to help increase the strength in my legs within 3 months   Date Goal set: 4/6/2020  Barriers: recent injury to coccyx from fall is causing pain that interferes with her ability to walk   Strengths: feels motivated currently to  improve her strength and mobility  Date to Achieve By: 7/1/2020  Patient expressed understanding of goal: yes  Action steps to achieve this goal:  1. I will do chair stretches to improve flexibility and reduce coccyx pain  2. I will walk from the back of the house to the front and back again using my cane if my roommate isn't working in the living room            COMPLETED: Medical (pt-stated)        2-Goal Statement:I will prevent a future fall   Measure of Success: No falls  Supportive Steps to Achieve:  I will use cane or walker at all times   I will use lifeline when I go to the bathroom because walker doesn't fit   Barriers:Lightheaded /Dizzy leg weakness   Strengths: outpatient PT/OT 3/6  Date to Achieve By: 1-1-20  Patient expressed understanding of goal: Yes          COMPLETED: Mental Health Management (pt-stated)        5-Goal Statement: my depression symptoms will improve  Measure of Success: improvement in depression symptoms  Supportive Steps to Achieve: RN and RN CC support  Barriers: history of SI  Strengths: willing to comply  Date to Achieve By: 2-2-20  Patient expressed understanding of goal: yes            Monitoring (pt-stated)        2-Goal Statement: Goal Statement: I will weigh myself and record these weights every day for the next six months  Date Goal set: 4/6/2020  Barriers: at times forgets to weigh herself or write it down  Strengths: states that her roommate can remind her  Date to Achieve By: 10/1/2020  Patient expressed understanding of goal: yes  Action steps to achieve this goal:  1. I will ask my roommate to remind me to weigh myself and record the weight daily  2. I will call my clinic or [facility] clinic with a daily weight gain over 2 lbs or 5 lbs in a week               Intervention/Education provided during outreach: stretches reviewed to improve coccyx pain; use of probiotic to help with diarrhea given recent antibiotic use and diverticulitis flare. [employer] sent with information  regarding diverticulitis per EMR resource       Plan:   Patient to contact clinic with worsening coccyx pain or diarrhea not improving  Care Coordinator will follow up in approximately one month    Vbiha Carvajal RN  [facility]-Care Coordination  [facility]  [facility]  [phone]

## 2020-04-08 DIAGNOSIS — H04.123 DRY EYE SYNDROME OF BOTH EYES: Primary | ICD-10-CM

## 2020-04-08 NOTE — TELEPHONE ENCOUNTER
Xiidra 5% eye drops      NOT ON MEDICATION LIST    Last Office Visit : 12-28-18  Future Office visit:  none    Routing refill request to provider for review/approval because:  Drug not active on patient's medication list.      Kathleen M Doege RN

## 2020-04-08 NOTE — TELEPHONE ENCOUNTER
Patient is a requesting a medication not on medication list  Xiidra 5% eye drops  Instill one drop to both eyes twice daily

## 2020-04-09 RX ORDER — LIFITEGRAST 50 MG/ML
1 SOLUTION/ DROPS OPHTHALMIC 2 TIMES DAILY
Qty: 60 EACH | Refills: 3 | Status: SHIPPED | OUTPATIENT
Start: 2020-04-09 | End: 2020-11-04

## 2020-04-10 ENCOUNTER — PATIENT OUTREACH (OUTPATIENT)
Dept: CARDIOLOGY | Facility: CLINIC | Age: 75
End: 2020-04-10

## 2020-04-10 NOTE — TELEPHONE ENCOUNTER
[facility] - CardioMEMS Reading Review      CardioMEMS reviewed and routed to patient's provider, Yuni SCHAEFER   Current diuretic: Bumex 4/2/2/ mg   Recent plan of care changes: none    Called Mariel to review mems. She has been feeling ok. Fell a month ago and inured her tailbone. Has been following with a chiropractor but unable to see them right now. Encouraged her to keep moving as much as she can. Is using pillows to sit on that provides some relief.     Current Threshold parameters:     GOAL PAD 14 - 18    Today's Waveform:       Readings:     Recent PA Diastolic readings:    4/5 15  4/6 16  4/7 15  4/8 14  4/9 17      RHC Date Wedge Pressure MEMS PAD during cath  (average of the 3 values)     7/1/2019 w/MEMS implant     20 mmHg   18 mmHg           Rosina Mays RN

## 2020-04-16 ENCOUNTER — VIRTUAL VISIT (OUTPATIENT)
Dept: FAMILY MEDICINE | Facility: CLINIC | Age: 75
End: 2020-04-16
Payer: MEDICARE

## 2020-04-16 DIAGNOSIS — M25.512 PAIN IN JOINT OF LEFT SHOULDER: ICD-10-CM

## 2020-04-16 DIAGNOSIS — K59.00 CONSTIPATION, UNSPECIFIED CONSTIPATION TYPE: Primary | ICD-10-CM

## 2020-04-16 DIAGNOSIS — K57.92 ACUTE DIVERTICULITIS: ICD-10-CM

## 2020-04-16 PROCEDURE — 99443 ZZC PHYSICIAN TELEPHONE EVALUATION 21-30 MIN: CPT | Performed by: FAMILY MEDICINE

## 2020-04-16 RX ORDER — PREGABALIN 100 MG/1
100 CAPSULE ORAL 2 TIMES DAILY
Qty: 180 CAPSULE | Refills: 3 | Status: SHIPPED | OUTPATIENT
Start: 2020-04-16 | End: 2021-04-26

## 2020-04-16 NOTE — PROGRESS NOTES
"Mariel Ribeiro is a 74 year old female who is being evaluated via a billable telephone visit.      The patient has been notified of following:     \"This telephone visit will be conducted via a call between you and your physician/provider. We have found that certain health care needs can be provided without the need for a physical exam.  This service lets us provide the care you need with a short phone conversation.  If a prescription is necessary we can send it directly to your pharmacy.  If lab work is needed we can place an order for that and you can then stop by our lab to have the test done at a later time.    Telephone visits are billed at different rates depending on your insurance coverage. During this emergency period, for some insurers they may be billed the same as an in-person visit.  Please reach out to your insurance provider with any questions.    If during the course of the call the physician/provider feels a telephone visit is not appropriate, you will not be charged for this service.\"    Patient has given verbal consent for Telephone visit?  Yes    How would you like to obtain your AVS? MyChart    Subjective     Mariel Ribeiro is a 74 year old female who presents to clinic today for the following health issues:  DX with diverticulitis 2 weeks ago at [facility]--Was referred by me to [facility] in [place] with Ora Villeda on 3/25/2020: prescribed Cipro 500 MG and Flagyl 500 MG tablet for diverticulitis.  Recently completed course of Abx,  Has been feeling much better but continues to struggle with constipation.  Has had some increased LLQ pain in past few days.  No fever or chills or N/V.    Today had a huge BM just prior to call and thinks she will now feel better following this BM.  Wonders how best she can manage her bowel regimen-- struggles to stay regular.    Needs refill on Lyrica 100 MG using for chronic pain LEFT shoulder with good results.    [facility] insurance does not cover Freestyle Kaylah, " interested in Dexcom system instead                Patient Active Problem List   Diagnosis     Hypothyroidism     Diverticulitis of colon     Coronary atherosclerosis     Myalgia and myositis     Migraine     Chronic airway obstruction/ COPD     Mononeuritis     Obstructive sleep apnea     Vitamin D deficiency     Hypertension goal BP (blood pressure) < 130/80     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)     Hyperlipidemia with target LDL less than 70     Chronic diastolic heart failure (H)     Osteoarthritis     Morbid obesity (H)     Arthritis of knee     Transient cerebral ischemia     History of thyroid cancer     Cervicalgia     Fatty liver disease, nonalcoholic     Hepatomegaly     Angina at rest (H)     S/P CABG x 3     Type 2 diabetes mellitus with stage 3 chronic kidney disease, with long-term current use of insulin (H)     Lymphedema     Lower back pain     Bilateral carotid artery disease (H)     Spinal stenosis of lumbar region, unspecified whether neurogenic claudication present     Status post coronary angiogram     Hemoptysis     Intertrigo     Malignant neoplasm of lower-inner quadrant of left breast in female, estrogen receptor positive (H)     Past Surgical History:   Procedure Laterality Date     ANGIOGRAPHY  8/11    with stent placement X2 mid- and proximal LAD     ARTHROPLASTY KNEE  1/28/2014    Procedure: ARTHROPLASTY KNEE;  ARTHROPLASTY KNEE -RIGHT TOTAL  ;  Surgeon: Victor Manuel Wolff MD;  Location: [facility] OR     BYPASS GRAFT ARTERY CORONARY N/A 7/2/2018    Procedure: BYPASS GRAFT ARTERY CORONARY;  Median Sternotomy, On Cardiopulmonary Bypass Pump, Coronary Artery Bypass Graft x 3, using Left Internal Mammary and Endoscopic Vein Milano on Bilateral Saphenous Vein, Transesophageal Echocardiogram, Epi-aortic Ultrasound;  Surgeon: Joao Mason MD;  Location: [facility] OR     C TOTAL KNEE ARTHROPLASTY  7/30/04    Knee Replacement, Total right and left     CATARACT IOL, RT/LT Bilateral       COLONOSCOPY       CV CARDIOMEMS WITH RIGHT HEART CATH N/A 7/1/2019    Procedure: CV CARDIOMEMS;  Surgeon: Michele Arce MD;  Location:  HEART CARDIAC CATH LAB     CV PULMONARY ANGIOGRAM N/A 7/1/2019    Procedure: Pulmonary Angiogram;  Surgeon: Michele Arce MD;  Location:  HEART CARDIAC CATH LAB     CV RIGHT HEART CATH N/A 7/1/2019    Procedure: CV RIGHT HEART CATH;  Surgeon: Michele Arce MD;  Location:  HEART CARDIAC CATH LAB     DILATION AND CURETTAGE, OPERATIVE HYSTEROSCOPY WITH MORCELLATOR, COMBINED N/A 11/1/2016    Procedure: COMBINED DILATION AND CURETTAGE, OPERATIVE HYSTEROSCOPY WITH MORCELLATOR;  Surgeon: Stacey Arnold DO;  Location: [facility]     HC INTRODUCE NEEDLE/CATH, EXTREMITY ARTERY  1999,2002,2004    Angiocardiogram     HC KNEE SCOPE, DIAGNOSTIC  1990's    Arthroscopy, Knee, bilateral     LAPAROSCOPIC CHOLECYSTECTOMY N/A 8/11/2017    Procedure: LAPAROSCOPIC CHOLECYSTECTOMY;  Laparoscopic Cholecystectomy   *Latex Allergy*, Anesthesia Block;  Surgeon: Jeffrey Roberson MD;  Location:  OR     PHACOEMULSIFICATION CLEAR CORNEA WITH STANDARD INTRAOCULAR LENS IMPLANT Right 12/29/2014    Procedure: PHACOEMULSIFICATION CLEAR CORNEA WITH STANDARD INTRAOCULAR LENS IMPLANT;  Surgeon: Smith Quintana MD;  Location: [facility]     PHACOEMULSIFICATION CLEAR CORNEA WITH TORIC INTRAOCULAR LENS IMPLANT Left 1/12/2015    Procedure: PHACOEMULSIFICATION CLEAR CORNEA WITH TORIC INTRAOCULAR LENS IMPLANT;  Surgeon: Smith Quintana MD;  Location: [facility]     SURGICAL HISTORY OF -   1963    dentures     SURGICAL HISTORY OF -   1985    thyroidectomy     SURGICAL HISTORY OF -   1998    right thumb surgery     SURGICAL HISTORY OF -   2001    right breast biopsy (benign)     SURGICAL HISTORY OF -   04/2004    left shoulder surgery - rotator cuff     SURGICAL HISTORY OF -   4/09    left thumb surgery     THYROIDECTOMY         Social History     Tobacco Use     Smoking status: Former Smoker     Packs/day:  0.00     Years: 27.00     Pack years: 0.00     Types: Cigarettes     Last attempt to quit: 1989     Years since quittin.8     Smokeless tobacco: Never Used   Substance Use Topics     Alcohol use: No     Alcohol/week: 0.0 standard drinks     Family History   Problem Relation Age of Onset     C.A.D. Mother      Diabetes Mother      Hypertension Mother      Blood Disease Mother         multiple episodes of thrombosis     Circulatory Mother         DVT X 2; ocular clot; cerebral; carotid artery stenosis     Glaucoma Mother      Macular Degeneration Mother      C.A.D. Father      Hypertension Father      Cerebrovascular Disease Father      Alcohol/Drug Father         etoh     Cancer Brother         liver,pancreas, brain     Cardiovascular Sister      Hypertension Sister      Hypertension Brother      Alcohol/Drug Sister         etoh     Alcohol/Drug Brother         drug     Diabetes Sister         younger     C.A.D. Sister         CABG age 65     C.A.D. Brother         CABG age 42     C.A.D. Sister         stents age 58     C.A.D. Brother      Genitourinary Problems Sister         kidney disease         Current Outpatient Medications   Medication Sig Dispense Refill     acetaminophen (TYLENOL) 325 MG tablet Take 650 mg by mouth every 4 hours as needed for mild pain Take 2 tablets by mouth every 4 hours as needed for mild pain 100 tablet      albuterol (PROAIR HFA) 108 (90 Base) MCG/ACT inhaler Inhale 1-2 puffs into the lungs every 4 hours as needed for wheezing 8.5 g      aspirin (ASA) 81 MG EC tablet Take 1 tablet (81 mg) by mouth daily       atorvastatin (LIPITOR) 40 MG tablet TAKE 1 TABLET(40 MG) BY MOUTH DAILY 90 tablet 3     bumetanide (BUMEX) 1 MG tablet As directed marcy [facility], continue current dose of 4 mg in the morning, 2 mg in the afternoon, and 2 mg in the evening 360 tablet 3     EPINEPHrine (EPIPEN/ADRENACLICK/OR ANY BX GENERIC EQUIV) 0.3 MG/0.3ML injection 2-pack Inject 0.3 mLs (0.3 mg) into the  "muscle once as needed for anaphylaxis 0.6 mL 0     escitalopram (LEXAPRO) 20 MG tablet TAKE 1 TABLET(20 MG) BY MOUTH EVERY MORNING 90 tablet 1     ferrous gluconate (FERGON) 324 (38 Fe) MG tablet Take 1 tablet (324 mg) by mouth Every Mon, Wed, Fri Morning 36 tablet 3     folic acid (FOLVITE) 1 MG tablet Take 2 tablets (2 mg) by mouth daily       guaiFENesin (MUCINEX) 600 MG 12 hr tablet Take 1 tablet (600 mg) by mouth 2 times daily       insulin glargine (LANTUS SOLOSTAR) 100 UNIT/ML pen Inject  31 units  daily subcutaneously.  call  if blood sugar <70, often >200. 15 mL 3     levothyroxine (SYNTHROID) 150 MCG tablet Take 1 tablet (150 mcg) by mouth daily 90 tablet 3     losartan (COZAAR) 50 MG tablet Take 0.5 tablets (25 mg) by mouth 2 times daily 90 tablet 0     metoprolol succinate ER (TOPROL-XL) 25 MG 24 hr tablet Please take 25 mg in the morning and 50 mg at night. 120 tablet 11     miconazole (MICATIN/MICRO GUARD) 2 % external powder Apply topically as needed for itching or other Redness in bilateral groin and katharine clef 43 g 0     niacin ER (NIASPAN) 500 MG CR tablet TAKE 1 TABLET(500 MG) BY MOUTH TWICE DAILY 180 tablet 3     nitroGLYcerin (NITROSTAT) 0.4 MG sublingual tablet For chest pain place 1 tablet under the tongue every 5 minutes for 3 doses. If symptoms persist 5 minutes after 1st dose call 911. 25 tablet 0     potassium chloride ER (K-DUR/KLOR-CON M) 10 MEQ CR tablet Take 20 mEq by mouth 3 times daily (2 tablets twice a day) 120 tablet 3     pregabalin (LYRICA) 100 MG capsule Take 1 capsule (100 mg) by mouth 2 times daily 180 capsule 3     repaglinide (PRANDIN) 1 MG tablet Take 1 tablet (1 mg) by mouth 3 times daily (before meals) 90 tablet 3     senna-docusate (SENOKOT-S/PERICOLACE) 8.6-50 MG tablet Take 1-2 tablets by mouth 2 times daily as needed for constipation 30 tablet 0     Skin Protectants, Misc. (INTERDRY 10\"X36\") SHEE Externally apply 1 Box topically daily 1 sheet under breasts prn " "intertrigo 1 each 3     Skin Protectants, Misc. (INTERDRY AG TEXTILE 10\"X144\") SHEE Externally apply 1 Box topically daily Apply to areas of intertrigo prn 1 each 3     traZODone (DESYREL) 50 MG tablet TAKE 3 TABLETS(150 MG) BY MOUTH AT BEDTIME 270 tablet 3     vitamin D3 (CHOLECALCIFEROL) 20355 units (250 mcg) capsule Take 1 capsule (10,000 Units) by mouth daily       buPROPion (WELLBUTRIN XL) 150 MG 24 hr tablet Take 1 tablet (150 mg) by mouth every morning (Patient not taking: Reported on 3/31/2020) 30 tablet 3     capsaicin (ZOSTRIX) 0.025 % external cream Apply 1 g topically 3 times daily For neuropathic pain. (Patient not taking: Reported on 3/31/2020) 60 g 1     Continuous Blood Gluc  (FREESTYLE MARTY 14 DAY READER) JEZ 1 Units 4 times daily (before meals and nightly) (Patient not taking: Reported on 4/16/2020) 1 Device 0     Continuous Blood Gluc Sensor (FREESTYLE MARTY 14 DAY SENSOR) MISC 1 Units 4 times daily (before meals and nightly) (Patient not taking: Reported on 4/16/2020) 6 each 3     LIFESCAN FINEPOINT LANCETS MISC Use to test blood sugars 2 times daily or as directed. 100 each prn     lifitegrast (XIIDRA) 5 % opthalmic solution Place 1 drop into both eyes 2 times daily For additional refills, please call [phone] and make an appointment to be seen by a provider (Patient not taking: Reported on 4/16/2020) 60 each 3     ONE TOUCH ULTRA (DEVICES) MISC test blood sugar BID 1 0     ONETOUCH ULTRA test strip USE FOUR TIMES DAILY 400 strip 1     traMADol (ULTRAM) 50 MG tablet Take 1 tablet (50 mg) by mouth every 6 hours as needed for breakthrough pain or severe pain (Patient not taking: Reported on 3/31/2020) 30 tablet 0     Allergies   Allergen Reactions     Albuterol Other (See Comments)     Sandrita-oral erythema  Confirmed 7/3/18 that patient uses albuterol inhalers at home. Patient had her home inhaler in her bag.     Contrast Dye Anaphylaxis     Fish Allergy Anaphylaxis     Iodine " Anaphylaxis     Oxycodone Other (See Comments)     Severe suicidal tendencies on this medication     Tree Nuts [Nuts] Anaphylaxis     Tree nuts only (peanuts ok)     Bactrim      Increased uric acid     Betadine [Povidone Iodine] Hives, Swelling and Difficulty breathing     Betadine     Combivent      Rash     Dulaglutide Other (See Comments)     Hx . Of thyroid cancer.     Fish      Lisinopril Other (See Comments)     Scr/grf severely reduced.      Penicillins Rash     Allopurinol Rash     Latex Rash     Wool Fiber Rash     Recent Labs   Lab Test 03/25/20  1353 02/18/20  1024  10/01/19  1133  04/30/19  0658  04/09/19  2050  03/13/19  1508 02/13/19  1449  09/10/18  0614  08/28/18  1427  07/01/18  0621  03/19/18  1404  03/08/17  1420   A1C  --   --   --   --   --  6.5*  --   --   --   --   --   --  5.2  --   --   --  9.3*  --  8.2*   < > 7.2*   LDL  --   --   --   --   --   --   --   --   --  76  --   --   --   --   --   --   --   --  111*  --  89   HDL  --   --   --   --   --   --   --   --   --  71  --   --   --   --   --   --   --   --  50  --  50   TRIG  --   --   --   --   --   --   --   --   --  118  --   --   --   --   --   --   --   --  212*  --  237*   ALT 17  --   --   --   --   --   --  22  --   --  19   < >  --   --   --    < > 26   < > 24   < >  --    CR 1.68* 1.89*   < > 1.59*   < >  --    < > 1.43*   < > 1.51* 1.42*   < > 1.48*   < > 1.41*   < >  --    < > 1.28*   < > 1.19*   GFRESTIMATED 30* 26*   < > 32*   < >  --    < > 36*   < > 34* 37*   < > 35*   < > 37*   < >  --    < > 41*   < > 45*   GFRESTBLACK 34* 30*   < > 37*   < >  --    < > 42*   < > 39* 42*   < > 42*   < > 44*   < >  --    < > 50*   < > 54*   POTASSIUM 3.9 4.4   < > 4.0   < >  --    < > 4.1   < > 4.6 4.0   < > 3.9   < > 4.0   < >  --    < > 3.6   < > 3.8   TSH  --   --   --  5.02*  --   --   --   --   --   --   --   --   --   --  2.97  --   --   --  1.15   < > 0.12*    < > = values in this interval not displayed.      BP Readings from  Last 3 Encounters:   03/25/20 (!) 157/68   02/27/20 134/68   02/18/20 110/45    Wt Readings from Last 3 Encounters:   02/18/20 147.6 kg (325 lb 8 oz)   01/08/20 147 kg (324 lb)   12/03/19 147 kg (324 lb)                    Reviewed and updated as needed this visit by Provider         Review of Systems   ROS COMP: Constitutional, HEENT, cardiovascular, pulmonary, GI, , musculoskeletal, neuro, skin, endocrine and psych systems are negative, except as otherwise noted.       Objective   Reported vitals:  There were no vitals taken for this visit.   alert and no distress  PSYCH: Alert and oriented times 3; coherent speech, normal   rate and volume, able to articulate logical thoughts, able   to abstract reason, no tangential thoughts, no hallucinations   or delusions  Her affect is normal  RESP: No cough, no audible wheezing, able to talk in full sentences  Remainder of exam unable to be completed due to telephone visits            Assessment/Plan:  1. Acute diverticulitis    Recently completed course of Abx for treatment.  Close monitoring if any change or worsening of symptoms.  Recommend gradually increasing fiber intake up to 60 g /day increasing by 5 g weekly until at this goal.  Continue with increased fluids and improved bowel regimen.    2. Constipation, unspecified constipation type    Discussed using Miralax and titrating it to effect.  Also okay to drink SmootheMove Tea in addiition with goal to have regular, soft stools avoiding recurrent constipation which can create more colonic diverticulae.    3. Pain in joint of left shoulder    - pregabalin (LYRICA) 100 MG capsule; Take 1 capsule (100 mg) by mouth 2 times daily  Dispense: 180 capsule; Refill: 3    Refill of Lyrica today.    No follow-ups on file.      Phone call duration:  30 minutes    Amee Connell MD

## 2020-04-20 ENCOUNTER — ALLIED HEALTH/NURSE VISIT (OUTPATIENT)
Dept: CARDIOLOGY | Facility: CLINIC | Age: 75
End: 2020-04-20
Attending: PHYSICIAN ASSISTANT
Payer: MEDICARE

## 2020-04-20 DIAGNOSIS — I50.32 CHRONIC DIASTOLIC HEART FAILURE (H): Primary | Chronic | ICD-10-CM

## 2020-04-21 ENCOUNTER — PATIENT OUTREACH (OUTPATIENT)
Dept: CARDIOLOGY | Facility: CLINIC | Age: 75
End: 2020-04-21

## 2020-04-21 NOTE — TELEPHONE ENCOUNTER
[facility] - CardioMEMS Reading Review      CardioMEMS reviewed and routed to patient's provider, Yuni SCHAEFER.   Current diuretic: Bumex 4/2/2 mg  Recent plan of care changes:     Current Threshold parameters:       Today's Waveform:       Readings:           RHC Date Wedge Pressure MEMS PAD during cath  (average of the 3 values)     7/1/2019 w/MEMS implant     20 mmHg   18 mmHg           Rosina Mays RN

## 2020-04-28 ENCOUNTER — PATIENT OUTREACH (OUTPATIENT)
Dept: CARDIOLOGY | Facility: CLINIC | Age: 75
End: 2020-04-28

## 2020-04-28 DIAGNOSIS — I50.32 CHRONIC DIASTOLIC HEART FAILURE (H): Chronic | ICD-10-CM

## 2020-04-28 NOTE — TELEPHONE ENCOUNTER
[facility] - CardioMEMS Reading Review      CardioMEMS reviewed and routed to patient's provider, Yuni SCHAEFER.   Current diuretic: Bumex 4/2/2 mg  Recent plan of care changes: none    Current Threshold parameters:       Today's Waveform:       Readings:           RHC Date Wedge Pressure MEMS PAD during cath  (average of the 3 values)     7/1/2019 w/MEMS implant     20 mmHg   18 mmHg           Rosina Mays RN

## 2020-04-29 NOTE — TELEPHONE ENCOUNTER
Reviewed with SILVANO Gutierrez. PAD has been stable (reading on 4/27 waveform suspicious). Try to decrease diuretics to see if can improve kidney function. Current dose Bumex 4/2/2. Per Yuni can try to decrease to 3/2/2 if Mariel is willing to get labs in 2 weeks.     I called Mariel and reviewed all of this. She reports she actually self decreased her Bumex about 2 weeks ago and stopped taking the 3rd dose of the day because she wasn't sleeping at all. Has just been taking 4 mg in the morning, 2 mg in the afternoon.     She reports she does have some worsening shortness of breath with exertion in the last week. Has been coughing more. Has increased swelling in her abdomen. Denies increased swelling in legs.   She does also note she has a sore throat. Denies any fevers but takes scheduled tylenol. She is worried it could be COVID but hasn't been out of home at all. She does have a roommate that has been out.     I gave her information to contact [facility] if she is concerned about symptoms to see if she should be tested.   In the meantime, while PAD numbers reassuring, will discuss HF symptoms with Yuni and call Mariel back with further recommendations.   Rosina Mays RN

## 2020-04-30 RX ORDER — POTASSIUM CHLORIDE 750 MG/1
20 TABLET, EXTENDED RELEASE ORAL 2 TIMES DAILY
Qty: 120 TABLET | Refills: 4 | Status: SHIPPED | OUTPATIENT
Start: 2020-04-30 | End: 2020-06-17

## 2020-04-30 NOTE — TELEPHONE ENCOUNTER
"Reviewed with Yuni Hall. Sending Cardiomems data for the last 2 days.   Mariel takes her readings in the late afternoon/evening and her readings the last two days were at 17 for her PA Diastolic.     Date: 4/30/2020    Time of Call: 7:55 AM     Diagnosis:  HFpEF     VORB: Ordering provider: Yuni SCHAEFER  Order: Increase Bumex back up and take it at 4 mg BID. Confirmed with Mariel she is currently taking Potassium at 20 mEq BID. She tells me she has been having chest pain recently.  Noted in previous clinic notes. Same type of chest pain on her L side \"above where they cut\". She reports it comes on when she is laying down and then as she sits up it goes away. Told her if she develops worsening chest pain or chest pain that does not go away, she should go to ER. Otherwise it does sound similar to previous and I told her I would notify provider.        Order received by: Rosina Mays RN      Follow-up/additional notes:        Rosina Mays RN   "

## 2020-04-30 NOTE — TELEPHONE ENCOUNTER
Reviewed with Yuni Hall. Since back to original dose of Bumex 8 mg total daily, continue Potassium at 20 mEq BID. Updating order to reflect this.   Yuni recommends if the pain in chest is above where cyst was in left breast, continue to monitor. If CP becomes different, worse or doesn't go away should be evaluated and already recommended this to Mariel.   Rosina Mays, RN

## 2020-05-04 ENCOUNTER — PATIENT OUTREACH (OUTPATIENT)
Dept: NURSING | Facility: CLINIC | Age: 75
End: 2020-05-04
Payer: MEDICARE

## 2020-05-04 DIAGNOSIS — I50.32 CHRONIC DIASTOLIC HEART FAILURE (H): Primary | ICD-10-CM

## 2020-05-04 NOTE — PROGRESS NOTES
"Clinic Care Coordination Contact    Follow Up Progress Note-pro-active outreach call placed to patient      Assessment: patient reports having sore throat since yesterday and cough for 4 weeks. Afebrile. Treating with fluids 2-3 16.9 fl oz bottles per day as recommended by cardiology), 1000 mg tylenol three times per day and gargling with biotine that she uses for dry mouth. No known sick exposures. States she doesn't leave the house and that her roommate is equally quarantined and works from home. Will occasionally sit outside in screened gazebo. No nasal congestion, sneezing, eye watering, etc to consider seasonal allergy component. She reported cough to her cardiology RN CC 4-28 and her bumex was increased to 4 mg twice daily. She reports this isn't really changing the cough much. Elevates her legs on pillows during the day to help with edema and she states her legs and ankles are not \"puffy\" today. Goals reviewed and updated and she is making slow but steady progression to goals.     Goals addressed this encounter:   Goals Addressed                 This Visit's Progress      Increase physical activity (pt-stated)   On track     1-Goal Statement: I want to return to riding my [employer] bike to help increase the strength in my legs within 3 months   Date Goal set: 4/6/2020  Barriers: recent injury to coccyx from fall is causing pain that interferes with her ability to walk comfortably  Strengths: feels motivated currently to improve her strength and mobility  Date to Achieve By: 7/1/2020  Patient expressed understanding of goal: yes  Action steps to achieve this goal:  1. I will do chair stretches to improve flexibility and reduce coccyx pain  2. I will walk from the back of the house to the front and back again using my cane if my roommate isn't working in the living room    5-4-2020: able to walk in the home but unable to proceed with returning to the bike due to coccyx pain or doing chair exercises until she " get a pillow that she ordered for coccyx support            Monitoring (pt-stated)   On track     2-Goal Statement: Goal Statement: I will weigh myself and record these weights every day for the next six months  Date Goal set: 4/6/2020  Barriers: at times forgets to weigh herself or write it down  Strengths: states that her roommate can remind her  Date to Achieve By: 10/1/2020  Patient expressed understanding of goal: yes  Action steps to achieve this goal:  1. I will ask my roommate to remind me to weigh myself and record the weight daily  2. I will call my clinic or [facility] clinic with a daily weight gain over 2 lbs or 5 lbs in a week    5-4-2020: hasn't been able to weigh herself for two weeks but just got new batteries for her scale and she is able to weigh herself again             Intervention/Education provided during outreach: home treatment measures for sore throat reviewed          Plan:   Keep weekly check in contact with cardiology RN CC to update regarding cough. Call clinic with worsening sore throat or development of additional symptoms, especially if fever noted.    Care Coordinator will follow up with patient this Friday per patient's request. Updated care plan sent to patient via Fabian Carvajal RN  [facility]-Care Coordination  [facility]  [facility]  [phone]

## 2020-05-04 NOTE — LETTER
[facility]  Complex Care Plan  About Me:    Patient Name:  Mariel Ribeiro    YOB: 1945  Age:         74 year old   [place] MRN:    [id number] Telephone Information:  Home Phone [phone]   Mobile [phone]       Address:  [address] [place] [address] Email address:  [email or web address]      Emergency Contact(s)    Name Relationship Lgl Grd Work Phone Home Phone Mobile Phone   1. KOSTAS PIERSON Roommate No [phone]   2. VITA ODOM Sister No  [phone]   3. ORTIZ, SAKSHI Friend No  [phone]           Primary language:  English     needed? No   [place] [facility]:  [phone] op. 1  Other communication barriers:    Preferred Method of Communication:  Fabian  Current living arrangement:    Mobility Status/ Medical Equipment:      Health Maintenance  Health Maintenance Reviewed:      My Access Plan  Medical Emergency 911   Primary Clinic Line [facility] - [phone]   24 Hour Appointment Line [phone] or  [phone]) (toll-free)   24 Hour Nurse Line [phone] (toll-free)   Preferred Urgent Care     Preferred Hospital     Preferred Pharmacy [id number] - [place], [place] - [facility] [address] AT [facility] [address]     Behavioral Health Crisis Line The National Suicide Prevention Lifeline at 1-272.886.6756 or 911             My Care Team Members  Patient Care Team       Relationship Specialty Notifications Start End    Amee Connell MD PCP - General Family Practice  5/29/19     Phone: [phone] Fax: [phone]         [address] [place] [address]    Karen Hilton [facility] Pharmacist Pharmacist  9/8/14      [address] [facility] [address]    Zeeshan Hutson MD MD Orthopedics  9/8/14     Phone: [phone] Fax: [phone]         [facility] [address]  [place]  [place] [address]    Jefry Hanson DPM  Podiatry  9/8/14     Phone: [phone] Fax: [phone]         [address] [facility] [address]    Tom Cruz MD MD Neurology  5/12/15     Phone: [phone] Fax: [phone]         [address] [facility] [facility] [address]    Rosina Kohler MD MD Family Medicine - Sports Medicine  11/21/16     Phone: [phone] Fax: [phone]         [address] [facility] [address]    Jeffrey Roberson MD MD General Surgery  5/22/17     Phone: [phone] Fax: [phone]         [address] [facility] [address] [facility] [address]    Stephanie Wolf MD MD Cardiology  7/2/18     Phone: [phone] Fax: [phone]         [address] [facility] [address] [facility] [address]    Cathi Vaughn APRN CNP Nurse Practitioner Cardiology Admissions 8/24/18     [facility]    Phone: [phone] Pager: [phone] Fax: [phone]        [address] [facility] [address]    Rosina Mays RN Nurse Coordinator Cardiology Admissions 8/24/18     [facility]    Phone: [phone] Fax: [phone]        Shayna Newell, RN Specialty Care Coordinator Cardiology Admissions 3/13/19     Phone: [phone]         Amee Connell MD Assigned PCP   5/5/19     Phone: [phone] Fax: [phone]         [facility] [place] [address]    Polly Carvajal, RN Lead Care Coordinator Primary Care - [facility] Admissions 6/28/19     Phone: [phone] Fax: [phone]        Magdalena Hall PA-C Physician Assistant Cardiology Admissions 12/10/19     [facility]    Phone: [phone] Fax: [phone] [address] [facility] [address]            My Care Plans  Self Management and Treatment Plan  Goals and (Comments)  Goals        General    Monitoring (pt-stated)     Notes - Note edited  5/4/2020  2:52 PM by Polly Carvajal RN    2-Goal Statement: Goal Statement: I will weigh myself and  record these weights every day for the next six months  Date Goal set: 4/6/2020  Barriers: at times forgets to weigh herself or write it down  Strengths: states that her roommate can remind her  Date to Achieve By: 10/1/2020  Patient expressed understanding of goal: yes  Action steps to achieve this goal:  1. I will ask my roommate to remind me to weigh myself and record the weight daily  2. I will call my clinic or [facility] with a daily weight gain over 2 lbs or 5 lbs in a week    5-4-2020: hasn't been able to weigh herself for two weeks but just got new batteries for her scale and she is able to weigh herself again         Lifestyle    Increase physical activity (pt-stated)     Notes - Note edited  5/4/2020  2:51 PM by Polly Carvajal RN    1-Goal Statement: I want to return to riding my [employer] bike to help increase the strength in my legs within 3 months   Date Goal set: 4/6/2020  Barriers: recent injury to coccyx from fall is causing pain that interferes with her ability to walk comfortably  Strengths: feels motivated currently to improve her strength and mobility  Date to Achieve By: 7/1/2020  Patient expressed understanding of goal: yes  Action steps to achieve this goal:  1. I will do chair stretches to improve flexibility and reduce coccyx pain  2. I will walk from the back of the house to the front and back again using my cane if my roommate isn't working in the living room    5-4-2020: able to walk in the home but unable to proceed with returning to the bike due to coccyx pain or doing chair exercises until she get a pillow that she ordered for coccyx support                 Action Plans on File:            Depression          Advance Care Plans/Directives Type:        My Medical and Care Information  Problem List   Patient Active Problem List   Diagnosis     Hypothyroidism     Diverticulitis of colon     Coronary atherosclerosis     Myalgia and myositis     Migraine     Chronic airway  obstruction/ COPD     Mononeuritis     Obstructive sleep apnea     Vitamin D deficiency     Hypertension goal BP (blood pressure) < 130/80     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)     Hyperlipidemia with target LDL less than 70     Chronic diastolic heart failure (H)     Osteoarthritis     Morbid obesity (H)     Arthritis of knee     Transient cerebral ischemia     History of thyroid cancer     Cervicalgia     Fatty liver disease, nonalcoholic     Hepatomegaly     Angina at rest (H)     S/P CABG x 3     Type 2 diabetes mellitus with stage 3 chronic kidney disease, with long-term current use of insulin (H)     Lymphedema     Lower back pain     Bilateral carotid artery disease (H)     Spinal stenosis of lumbar region, unspecified whether neurogenic claudication present     Status post coronary angiogram     Hemoptysis     Intertrigo     Malignant neoplasm of lower-inner quadrant of left breast in female, estrogen receptor positive (H)      Current Medications and Allergies:  See printed Medication Report.    Care Coordination Start Date: No linked episodes   Frequency of Care Coordination:     Form Last Updated: 05/04/2020

## 2020-05-05 ENCOUNTER — PATIENT OUTREACH (OUTPATIENT)
Dept: CARDIOLOGY | Facility: CLINIC | Age: 75
End: 2020-05-05

## 2020-05-05 NOTE — TELEPHONE ENCOUNTER
[facility] - CardioMEMS Reading Review      CardioMEMS reviewed and routed to patient's provider, Yuni SCHAEFER.   Current diuretic: Bumex 4 mg BID    Recent plan of care changes: Had self decreased to 4mg/2mg for a bit as she didn't like the evening dose. Increased back to 4 mg BID last week.     Current Threshold parameters:     14 - 18      Readings:     Recent PA Diastolic Readings:  4/30 - 14  5/1 - 13  5/2 - 18   5/3 - 16  5/4 - 15      RHC Date Wedge Pressure MEMS PAD during cath  (average of the 3 values)     7/1/2019 w/MEMS implant     20 mmHg   18 mmHg           Rosina Mays RN

## 2020-05-06 ENCOUNTER — VIRTUAL VISIT (OUTPATIENT)
Dept: CARDIOLOGY | Facility: CLINIC | Age: 75
End: 2020-05-06
Payer: MEDICARE

## 2020-05-06 DIAGNOSIS — R00.2 PALPITATIONS: Primary | ICD-10-CM

## 2020-05-06 DIAGNOSIS — I50.32 CHRONIC DIASTOLIC HEART FAILURE (H): Chronic | ICD-10-CM

## 2020-05-06 PROCEDURE — 99443: CPT | Performed by: INTERNAL MEDICINE
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
## 2020-05-06 NOTE — NURSING NOTE
Diet: Patient instructed regarding a heart failure healthy diet, including discussion of reduced fat and 2000 mg daily sodium restriction, daily weights, medication purpose and compliance, fluid restrictions and resources for patient and family to access for assistance with heart failure management.       Labs: Patient was given results of the laboratory testing obtained today and patient was instructed about when to return for the next laboratory testing. Repeat BMP prior to folow up CORE appointment.     Med Reconcile: Reviewed and verified all current medications with the patient. The updated medication list was printed and given to the patient. NO CHANGES    Return Appointment: Patient given instructions regarding scheduling next clinic visit. RTC in 6-8 weeks for CORE clinic. RTC in 6 months for Dr. Wolf.    Patient stated she understood all health information given and agreed to call with further questions or concerns.     Shayna Newell RN

## 2020-05-06 NOTE — PATIENT INSTRUCTIONS
Cardiology Providers you saw during your visit:  Dr. Wolf     Medication changes:  1- No medication changes.      Continue to monitor your symptoms of sore throat and shortness of breath. If your shortness of breath is getting worse, or your oxygen levels drop below 90% you need to let us know or go to receive emergency care. Continue to stay home and practice social distancing.       Follow up:  1- We will send you out a ziopatch monitor. Please wear this for 7 days and then send it back in.  2 - Return to clinic in 6-8 weeks to see CORE clinic with labs prior.  3 - Return to see Dr. Wolf in 6 months with labs prior.     Please call if you have:  1. Weight gain of more than 2 pounds in a day or 5 pounds in a week  2. Increased shortness of breath, swelling or bloating  3. Dizziness, lightheadedness   4. Any questions or concerns.      Follow the American Heart Association Diet and Lifestyle recommendations:  Limit saturated fat, trans fat, sodium, red meat, sweets and sugar-sweetened beverages. If you choose to eat red meat, compare labels and select the leanest cuts available.  Aim for at least 150 minutes of moderate physical activity or 75 minutes of vigorous physical activity - or an equal combination of both - each week.     During business hours: [phone], press option # 1 to be routed to the [facility] then option # 4 to send a message to your care team     After hours, weekends or holidays: On Call Cardiologist- [phone]   option #4 and ask to speak to the on-call Cardiologist. Inform them you are a CORE/heart failure patient at the [facility].     Shayna Newell RN BSN  Cardiology Nurse Care Coordinator     Keep up the good work!     Take Care!

## 2020-05-08 ENCOUNTER — PATIENT OUTREACH (OUTPATIENT)
Dept: NURSING | Facility: CLINIC | Age: 75
End: 2020-05-08
Payer: MEDICARE

## 2020-05-08 DIAGNOSIS — I50.32 CHRONIC DIASTOLIC HEART FAILURE (H): Primary | ICD-10-CM

## 2020-05-11 ENCOUNTER — COMMUNICATION - HEALTHEAST (OUTPATIENT)
Dept: SCHEDULING | Facility: CLINIC | Age: 75
End: 2020-05-11

## 2020-05-11 ENCOUNTER — PATIENT OUTREACH (OUTPATIENT)
Dept: CARE COORDINATION | Facility: CLINIC | Age: 75
End: 2020-05-11

## 2020-05-11 ENCOUNTER — TRANSFERRED RECORDS (OUTPATIENT)
Dept: HEALTH INFORMATION MANAGEMENT | Facility: CLINIC | Age: 75
End: 2020-05-11

## 2020-05-11 DIAGNOSIS — I50.32 CHRONIC DIASTOLIC HEART FAILURE (H): Primary | ICD-10-CM

## 2020-05-11 NOTE — LETTER
Health Care Home - Access Care Plan    About Me:    Patient Name:  Mariel Ribeiro    YOB: 1945  Age:                      74 year old   Destiney MRN:     [id number] Telephone Information:   Home Phone [phone]   Mobile [phone]       Address:  [address] [place] [address] Email address:  [email or web address]      Emergency Contact(s)   Name Relationship Lgl Grd Work Phone Home Phone Mobile Phone   1. KOSTAS PIERSON Roommate No [phone]   2. VITA ODOM Sister No  [phone]   3. ORTIZ, SAKSHI Friend No  [phone]             Health Maintenance:      My Access Plan  Medical Emergency 911   Questions or concerns during clinic hours Primary Clinic Line, I will call the clinic directly: [facility] [phone]   24 Hour Appointment Line [phone] or  [phone]) (toll free)   24 Hour Nurse Line [phone] (toll free)   Questions or concerns outside clinic hours 24 Hour Appointment Line, I will call the after-hours on-call line:   [facility] [phone] or [phone] ([phone]) (toll-free)   Preferred Urgent Care     Preferred Hospital     Preferred Pharmacy [id number] - [place], [place] - [address] AT [facility] [address]     Behavioral Health Crisis Line The National Suicide Prevention Lifeline at 1-580.414.3502 or 917                     My Care Team Members  Patient Care Team       Relationship Specialty Notifications Start End    Amee Connell MD PCP - General Family Practice  5/29/19     Phone: [phone] Fax: [phone]         [address] [place] [address]    Karen Hilton RP Pharmacist Pharmacist  9/8/14      [address] [facility] [address]    Zeeshan Hutson MD MD Orthopedics  9/8/14     Phone: [phone] Fax: [phone]         [address]ANYA CASTANON [address]  [place]  [address]    Jefry Hanson DPM  Podiatry  9/8/14     Phone: [phone] Fax: [phone]         [address] [facility] [address]    Tom Cruz MD MD Neurology  5/12/15     Phone: [phone] Fax: [phone]         [address] [facility] [facility] [address]    Rosina Kohler MD MD Family Medicine - Sports Medicine  11/21/16     Phone: [phone] Fax: [phone]         [address] [facility] [address]    Jeffrey Roberson MD MD General Surgery  5/22/17     Phone: [phone] Fax: [phone]         [address] [facility] [address] [facility] [address]    Stephanie Wolf MD MD Cardiology  7/2/18     Phone: [phone] Fax: [phone]         [address] [facility] [address] [facility] [address]    Cathi Vaughn APRN CNP Nurse Practitioner Cardiology Admissions 8/24/18     [facility]    Phone: [phone] Pager: [phone] Fax: [phone]        [address] [facility] [address]    Rosina Mays RN Nurse Coordinator Cardiology Admissions 8/24/18     [facility]    Phone: [phone] Fax: [phone]        Shayna Newell, RN Specialty Care Coordinator Cardiology Admissions 3/13/19     Phone: [phone]         Amee Connell MD Assigned PCP   5/5/19     Phone: [phone] Fax: [phone]         [facility] [place] [address]    Polly Carvajal, RN Lead Care Coordinator Primary Care - CC Admissions 6/28/19     Phone: [phone] Fax: [phone]        Magdalena Hall PA-C Physician Assistant Cardiology Admissions 12/10/19     [facility]    Phone: [phone] Fax: [phone] [address] [facility] [address]           My Medical and Care Information  Problem List   Patient Active Problem List   Diagnosis     Hypothyroidism     Diverticulitis of colon     Coronary atherosclerosis     Myalgia and myositis     Migraine     Chronic airway obstruction/ COPD     Mononeuritis      Obstructive sleep apnea     Vitamin D deficiency     Hypertension goal BP (blood pressure) < 130/80     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)     Hyperlipidemia with target LDL less than 70     Chronic diastolic heart failure (H)     Osteoarthritis     Morbid obesity (H)     Arthritis of knee     Transient cerebral ischemia     History of thyroid cancer     Cervicalgia     Fatty liver disease, nonalcoholic     Hepatomegaly     Angina at rest (H)     S/P CABG x 3     Type 2 diabetes mellitus with stage 3 chronic kidney disease, with long-term current use of insulin (H)     Lymphedema     Lower back pain     Bilateral carotid artery disease (H)     Spinal stenosis of lumbar region, unspecified whether neurogenic claudication present     Status post coronary angiogram     Hemoptysis     Intertrigo     Malignant neoplasm of lower-inner quadrant of left breast in female, estrogen receptor positive (H)      Current Medications and Allergies:  See printed Medication Report

## 2020-05-11 NOTE — PROGRESS NOTES
Clinic Care Coordination Contact    Follow Up Progress Note      Assessment: follow up call placed to patient. Her reported sore throat has resolved. She notes she was sitting on her shower bench taking a shower when her leg slipped suddenly and she almost fell out of the tub. Has resulting groin and thigh muscle pain. Treating with ice which helps. No bruising or swelling noted by patient. Walking helps improve pain and she is walking the perimeter of the inside of her house for exercise. Still waiting for her coccyx pillow to arrive in the mail so she can resume exercise on her [employer] bike. Had virtual visit with cardiologist. No changes made. Patient still having shortness of breath with minimal exertion but at baseline.    Goals addressed this encounter:   Goals Addressed                 This Visit's Progress      Increase physical activity (pt-stated)   Not on track     1-Goal Statement: I want to return to riding my [employer] bike to help increase the strength in my legs within 3 months   Date Goal set: 4/6/2020  Barriers: recent injury to coccyx from fall is causing pain that interferes with her ability to walk comfortably  Strengths: feels motivated currently to improve her strength and mobility  Date to Achieve By: 7/1/2020  Patient expressed understanding of goal: yes  Action steps to achieve this goal:  1. I will do chair stretches to improve flexibility and reduce coccyx pain  2. I will walk from the back of the house to the front and back again using my cane if my roommate isn't working in the living room    5-4-2020: able to walk in the home but unable to proceed with returning to the bike due to coccyx pain or doing chair exercises until she get a pillow that she ordered for coccyx support            Monitoring (pt-stated)   On track     2-Goal Statement: Goal Statement: I want to maintain my weight so that it meets the weight parameter set by my cardiologist and [facility] team for the next  six months  Date Goal set: 5-8-2020  Barriers: at times forgets to weigh herself or write it down  Strengths: states that her roommate can remind her  Date to Achieve By: 10/1/2020  Patient expressed understanding of goal: yes  Action steps to achieve this goal:  1. I will ask my roommate to remind me to weigh myself and record the weight daily  2. I will call my clinic or [facility] with a daily weight gain over 2 lbs in one day or 5 lbs in one week  3. I will update my cardiology team with my recorded weights at my appointments    5-4-2020: hasn't been able to weigh herself for two weeks but just got new batteries for her scale and she is able to weigh herself again             Intervention/Education provided during outreach: home treatment measures for thigh/groin pain reviewed     Outreach Frequency: monthly    Plan:   Continue home treatment measures for thigh and groin pain. Call clinic if worsening  Care Coordinator will follow up in approximately one month    Vibha Carvajal RN  [facility] Primary Care-Care Coordination  [facility]  [facility]  [phone]

## 2020-05-11 NOTE — PROGRESS NOTES
"Clinic Care Coordination Contact    Follow Up Progress Note      Assessment: received voice mail from patient from 5- stating that her blood pressure per her home blood pressure monitoring machine was 84/40 and that her O2 saturation per her home oximeter was 81-83% on room air.Called patient. She reports sudden onset of dry cough and fatigue. Slept most of the day yesterday. reports worsening sore throat (had improved as of Friday and has been present for several weeks). Does not report fever. Shortness of breath is at baseline. Weight remains \"the same\" per daily weight readings that she records from home scale. BP today 148/67, pulse 73, O2 saturation 93-94% on RA. All reported vitals were taken while patient sitting. She states she tried calling her [facility] team (Daniel/Yuni) and her PCP's clinic yesterday but was not able to reach anyone to ask what to do so called me.    Goals addressed this encounter:   Goals Addressed                 This Visit's Progress      Increase physical activity (pt-stated)   Not on track     1-Goal Statement: I want to return to riding my [employer] bike to help increase the strength in my legs within 3 months   Date Goal set: 4/6/2020  Barriers: recent injury to coccyx from fall is causing pain that interferes with her ability to walk comfortably  Strengths: feels motivated currently to improve her strength and mobility  Date to Achieve By: 7/1/2020  Patient expressed understanding of goal: yes  Action steps to achieve this goal:  1. I will do chair stretches to improve flexibility and reduce coccyx pain  2. I will walk from the back of the house to the front and back again using my cane if my roommate isn't working in the living room    5-4-2020: able to walk in the home but unable to proceed with returning to the bike due to coccyx pain or doing chair exercises until she get a pillow that she ordered for coccyx support            Monitoring (pt-stated)   On track     2-Goal " Statement: Goal Statement: I want to maintain my weight so that it meets the weight parameter set by my cardiologist and [facility] team for the next six months  Date Goal set: 5-8-2020  Barriers: at times forgets to weigh herself or write it down  Strengths: states that her roommate can remind her  Date to Achieve By: 10/1/2020  Patient expressed understanding of goal: yes  Action steps to achieve this goal:  1. I will ask my roommate to remind me to weigh myself and record the weight daily  2. I will call my clinic or [facility] with a daily weight gain over 2 lbs in one day or 5 lbs in one week  3. I will update my cardiology team with my recorded weights at my appointments    5-4-2020: hasn't been able to weigh herself for two weeks but just got new batteries for her scale and she is able to weigh herself again             Intervention/Education provided during outreach: 24 hour nurse line phone number given to patient for future use as needed     Outreach Frequency: monthly    Plan:   Asked patient to call cardiology clinic for plan. Called [facility] team and asked them to call patient with plan for weekend coverage for nurse advice when needed. Asked patient to call me back with plan from cardiology clinic to help determine follow up plan. She agrees with plan    Vibha Carvajal RN  [facility] Primary Care-Care Coordination  [facility] Primary [facility]  [facility]  [phone]

## 2020-05-12 ENCOUNTER — PATIENT OUTREACH (OUTPATIENT)
Dept: CARDIOLOGY | Facility: CLINIC | Age: 75
End: 2020-05-12

## 2020-05-12 ENCOUNTER — HOSPITAL ENCOUNTER (EMERGENCY)
Facility: CLINIC | Age: 75
Discharge: HOME | End: 2020-05-12
Attending: FAMILY MEDICINE | Admitting: FAMILY MEDICINE
Payer: MEDICARE

## 2020-05-12 ENCOUNTER — TELEPHONE (OUTPATIENT)
Dept: CARE COORDINATION | Facility: CLINIC | Age: 75
End: 2020-05-12

## 2020-05-12 ENCOUNTER — APPOINTMENT (OUTPATIENT)
Dept: GENERAL RADIOLOGY | Facility: CLINIC | Age: 75
End: 2020-05-12
Attending: FAMILY MEDICINE
Payer: MEDICARE

## 2020-05-12 ENCOUNTER — APPOINTMENT (OUTPATIENT)
Dept: NUCLEAR MEDICINE | Facility: CLINIC | Age: 75
End: 2020-05-12
Attending: FAMILY MEDICINE
Payer: MEDICARE

## 2020-05-12 ENCOUNTER — PATIENT OUTREACH (OUTPATIENT)
Dept: CARE COORDINATION | Facility: CLINIC | Age: 75
End: 2020-05-12

## 2020-05-12 VITALS
TEMPERATURE: 98.2 F | HEART RATE: 77 BPM | OXYGEN SATURATION: 99 % | DIASTOLIC BLOOD PRESSURE: 74 MMHG | BODY MASS INDEX: 52.13 KG/M2 | SYSTOLIC BLOOD PRESSURE: 153 MMHG | RESPIRATION RATE: 18 BRPM | WEIGHT: 293 LBS

## 2020-05-12 DIAGNOSIS — I50.32 CHRONIC DIASTOLIC HEART FAILURE (H): Primary | ICD-10-CM

## 2020-05-12 DIAGNOSIS — J44.9 CHRONIC OBSTRUCTIVE PULMONARY DISEASE, UNSPECIFIED COPD TYPE (H): Chronic | ICD-10-CM

## 2020-05-12 DIAGNOSIS — R06.02 SOB (SHORTNESS OF BREATH): ICD-10-CM

## 2020-05-12 LAB
ALBUMIN SERPL-MCNC: 3.7 G/DL (ref 3.4–5)
ALP SERPL-CCNC: 113 U/L (ref 40–150)
ALT SERPL W P-5'-P-CCNC: 58 U/L (ref 0–50)
ANION GAP SERPL CALCULATED.3IONS-SCNC: 5 MMOL/L (ref 3–14)
APTT PPP: 27 SEC (ref 22–37)
AST SERPL W P-5'-P-CCNC: 31 U/L (ref 0–45)
BASOPHILS # BLD AUTO: 0 10E9/L (ref 0–0.2)
BASOPHILS NFR BLD AUTO: 0.5 %
BILIRUB SERPL-MCNC: 0.5 MG/DL (ref 0.2–1.3)
BUN SERPL-MCNC: 44 MG/DL (ref 7–30)
CALCIUM SERPL-MCNC: 9.9 MG/DL (ref 8.5–10.1)
CHLORIDE SERPL-SCNC: 104 MMOL/L (ref 94–109)
CO2 SERPL-SCNC: 29 MMOL/L (ref 20–32)
CREAT SERPL-MCNC: 1.53 MG/DL (ref 0.52–1.04)
CRP SERPL-MCNC: 22 MG/L (ref 0–8)
DIFFERENTIAL METHOD BLD: ABNORMAL
EOSINOPHIL # BLD AUTO: 0.2 10E9/L (ref 0–0.7)
EOSINOPHIL NFR BLD AUTO: 3.4 %
ERYTHROCYTE [DISTWIDTH] IN BLOOD BY AUTOMATED COUNT: 12.6 % (ref 10–15)
ERYTHROCYTE [SEDIMENTATION RATE] IN BLOOD BY WESTERGREN METHOD: 33 MM/H (ref 0–30)
GFR SERPL CREATININE-BSD FRML MDRD: 33 ML/MIN/{1.73_M2}
GLUCOSE SERPL-MCNC: 146 MG/DL (ref 70–99)
HCT VFR BLD AUTO: 39.9 % (ref 35–47)
HGB BLD-MCNC: 12.8 G/DL (ref 11.7–15.7)
IMM GRANULOCYTES # BLD: 0 10E9/L (ref 0–0.4)
IMM GRANULOCYTES NFR BLD: 0.5 %
INR PPP: 1.01 (ref 0.86–1.14)
INTERPRETATION ECG - MUSE: NORMAL
LYMPHOCYTES # BLD AUTO: 1.5 10E9/L (ref 0.8–5.3)
LYMPHOCYTES NFR BLD AUTO: 26.5 %
MCH RBC QN AUTO: 29 PG (ref 26.5–33)
MCHC RBC AUTO-ENTMCNC: 32.1 G/DL (ref 31.5–36.5)
MCV RBC AUTO: 91 FL (ref 78–100)
MONOCYTES # BLD AUTO: 0.5 10E9/L (ref 0–1.3)
MONOCYTES NFR BLD AUTO: 8.3 %
NEUTROPHILS # BLD AUTO: 3.4 10E9/L (ref 1.6–8.3)
NEUTROPHILS NFR BLD AUTO: 60.8 %
NRBC # BLD AUTO: 0 10*3/UL
NRBC BLD AUTO-RTO: 0 /100
NT-PROBNP SERPL-MCNC: 530 PG/ML (ref 0–900)
PLATELET # BLD AUTO: 125 10E9/L (ref 150–450)
POTASSIUM SERPL-SCNC: 3.8 MMOL/L (ref 3.4–5.3)
PROT SERPL-MCNC: 7.2 G/DL (ref 6.8–8.8)
RBC # BLD AUTO: 4.41 10E12/L (ref 3.8–5.2)
SARS-COV-2 PCR COMMENT: NORMAL
SARS-COV-2 RNA SPEC QL NAA+PROBE: NEGATIVE
SARS-COV-2 RNA SPEC QL NAA+PROBE: NORMAL
SODIUM SERPL-SCNC: 138 MMOL/L (ref 133–144)
SPECIMEN SOURCE: NORMAL
SPECIMEN SOURCE: NORMAL
TROPONIN I SERPL-MCNC: <0.015 UG/L (ref 0–0.04)
WBC # BLD AUTO: 5.6 10E9/L (ref 4–11)

## 2020-05-12 PROCEDURE — 86140 C-REACTIVE PROTEIN: CPT | Performed by: FAMILY MEDICINE

## 2020-05-12 PROCEDURE — 93010 ELECTROCARDIOGRAM REPORT: CPT | Mod: Z6 | Performed by: FAMILY MEDICINE

## 2020-05-12 PROCEDURE — 85610 PROTHROMBIN TIME: CPT | Performed by: FAMILY MEDICINE

## 2020-05-12 PROCEDURE — 85652 RBC SED RATE AUTOMATED: CPT | Performed by: FAMILY MEDICINE

## 2020-05-12 PROCEDURE — 93005 ELECTROCARDIOGRAM TRACING: CPT | Performed by: FAMILY MEDICINE

## 2020-05-12 PROCEDURE — 85730 THROMBOPLASTIN TIME PARTIAL: CPT | Performed by: FAMILY MEDICINE

## 2020-05-12 PROCEDURE — 84484 ASSAY OF TROPONIN QUANT: CPT | Performed by: FAMILY MEDICINE

## 2020-05-12 PROCEDURE — 99285 EMERGENCY DEPT VISIT HI MDM: CPT | Mod: 25 | Performed by: FAMILY MEDICINE

## 2020-05-12 PROCEDURE — A9540 TC99M MAA: HCPCS | Performed by: FAMILY MEDICINE

## 2020-05-12 PROCEDURE — 83880 ASSAY OF NATRIURETIC PEPTIDE: CPT | Performed by: FAMILY MEDICINE

## 2020-05-12 PROCEDURE — 71045 X-RAY EXAM CHEST 1 VIEW: CPT

## 2020-05-12 PROCEDURE — 78580 LUNG PERFUSION IMAGING: CPT

## 2020-05-12 PROCEDURE — 80053 COMPREHEN METABOLIC PANEL: CPT | Performed by: FAMILY MEDICINE

## 2020-05-12 PROCEDURE — 34300033 ZZH RX 343: Performed by: FAMILY MEDICINE

## 2020-05-12 PROCEDURE — 85025 COMPLETE CBC W/AUTO DIFF WBC: CPT | Performed by: FAMILY MEDICINE

## 2020-05-12 PROCEDURE — 87635 SARS-COV-2 COVID-19 AMP PRB: CPT | Performed by: FAMILY MEDICINE

## 2020-05-12 RX ORDER — LIDOCAINE 40 MG/G
CREAM TOPICAL
Status: DISCONTINUED | OUTPATIENT
Start: 2020-05-12 | End: 2020-05-12 | Stop reason: HOSPADM

## 2020-05-12 RX ADMIN — KIT FOR THE PREPARATION OF TECHNETIUM TC 99M ALBUMIN AGGREGATED 6.8 MCI.: 2.5 INJECTION, POWDER, FOR SOLUTION INTRAVENOUS at 16:57

## 2020-05-12 ASSESSMENT — ENCOUNTER SYMPTOMS
DIZZINESS: 0
CONFUSION: 0
ARTHRALGIAS: 0
VOMITING: 1
ACTIVITY CHANGE: 0
MYALGIAS: 1
NECK STIFFNESS: 0
CHOKING: 0
COLOR CHANGE: 0
WEAKNESS: 0
DECREASED CONCENTRATION: 0
HALLUCINATIONS: 0
BRUISES/BLEEDS EASILY: 0
HEADACHES: 1
FEVER: 0
ABDOMINAL PAIN: 0
CHILLS: 0
SORE THROAT: 1
DIARRHEA: 0
DYSPHORIC MOOD: 0
SHORTNESS OF BREATH: 1
COUGH: 1
EYE REDNESS: 0
NAUSEA: 0
DIFFICULTY URINATING: 0
APPETITE CHANGE: 0

## 2020-05-12 NOTE — PROGRESS NOTES
Clinic Care Coordination Contact    Follow Up Progress Note      Assessment: received voice mail from patient asking outcome of my call to cardiology clinic (see 5/11/2020 RN CC encounter). Called patient to clarify instructions that she was to call cardiology clinic as discussed and that I was going to call [facility] staff to review plan for weekend coverage. She agrees she misunderstood our conversation. She ended up going to [facility] yesterday for evaluation for cough, shortness of breath and O2 saturation reading of 81% on Sunday. She had elevated D-Dimer and was sent to [facility] ED for VQ scan. She left AMA after being told she would need to repeat all of the lab work that was done in the [facility] and she felt this to be fraudulent. O2 sats today per home reading are 95% on RA, pulse 67. Temp 95.4 (patient state she runs very low typically). Weight down two pounds from yesterday now 323.5 lb. Patient states she is short of breath while speaking but this is not heard by writer.     Goals addressed this encounter:   Goals Addressed                 This Visit's Progress      Increase physical activity (pt-stated)   Not on track     1-Goal Statement: I want to return to riding my [employer] bike to help increase the strength in my legs within 3 months   Date Goal set: 4/6/2020  Barriers: recent injury to coccyx from fall is causing pain that interferes with her ability to walk comfortably  Strengths: feels motivated currently to improve her strength and mobility  Date to Achieve By: 7/1/2020  Patient expressed understanding of goal: yes  Action steps to achieve this goal:  1. I will do chair stretches to improve flexibility and reduce coccyx pain  2. I will walk from the back of the house to the front and back again using my cane if my roommate isn't working in the living room    5-4-2020: able to walk in the home but unable to proceed with returning to the bike due to coccyx pain or doing chair  exercises until she get a pillow that she ordered for coccyx support            Monitoring (pt-stated)   On track     2-Goal Statement: Goal Statement: I want to maintain my weight so that it meets the weight parameter set by my cardiologist and [facility] team for the next six months  Date Goal set: 5-8-2020  Barriers: at times forgets to weigh herself or write it down  Strengths: states that her roommate can remind her  Date to Achieve By: 10/1/2020  Patient expressed understanding of goal: yes  Action steps to achieve this goal:  1. I will ask my roommate to remind me to weigh myself and record the weight daily  2. I will call my clinic or [facility] with a daily weight gain over 2 lbs in one day or 5 lbs in one week  3. I will update my cardiology team with my recorded weights at my appointments    5-4-2020: hasn't been able to weigh herself for two weeks but just got new batteries for her scale and she is able to weigh herself again             Intervention/Education provided during outreach: reviewed with patient concerns surrounding pulmonary embolism     Plan:   Attempted to contact clinic triage team but none available. Patient agrees to present to [facility] ED for follow up, likely nuclear medicine study if needed as patient is allergic to contrast dye. Charge nurse notified.  Care Coordinator will follow up in 1-2 business days    Vibha Carvajal RN  [facility] Primary Care-Care Coordination  [facility] Primary [facility]  [facility]  [phone]

## 2020-05-12 NOTE — ED PROVIDER NOTES
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 notes.    I have reviewed the findings, diagnosis, plan and need for follow up with the patient.    Discharge Medication List as of 5/12/2020  8:40 PM          Final diagnoses:   SOB (shortness of breath)   Chronic obstructive pulmonary disease, unspecified COPD type (H)   IHamlet, am serving as a trained medical scribe to document services personally performed by Jaron Marin MD, based on the provider's statements to me.     IJaron MD, was physically present and have reviewed and verified the accuracy of this note documented by Hamlet Matos.    5/12/2020   [facility], EMERGENCY DEPARTMENT      Patient evaluated in the emergency department during the COVID-19 pandemic period. Careful attention to patients safety was addressed throughout the evaluation. Evaluation and treatment management was initiated with disposition made efficiently and appropriate as possible to minimize any risk of potential exposure to patient during this evaluation.  This note was created at least in part by the use of dragon voice dictation system. Inadvertent typographical errors may still exist.  Jaron Marin MD.         Jaron Marin MD  05/12/20 [id number]

## 2020-05-12 NOTE — ED NOTES
Bed: IN03  Expected date:   Expected time:   Means of arrival: Car  Comments:  Mariel Ribeiro MRN [id number]    Coming from [facility] with elevated D dimer, coming for VQ Scan  Left AMA from [facility] yesterday because she did not want labs repeated  O2 sats in the low 80s on Sunday, but 95% today at home

## 2020-05-12 NOTE — ED TRIAGE NOTES
Pt reported to an urgent care on 5/11 for SOB. Urgent care told pt that they possibly had a blood clot and referred pt to ER.

## 2020-05-12 NOTE — TELEPHONE ENCOUNTER
[facility] CORE Clinic - CardioMEMS Reading Review      CardioMEMS reviewed and routed to patient's provider, Yuni SCHAEFER.   Current diuretic: Bumex 4 mg BID  Recent plan of care changes: Yesterday went to URgency Room and then was transferred to [facility] ER. Per report D Dimer elevated at Urgency Room so sent to ER. Also has SOB, cough and low O2 sats and they did test for COVID. Results pending on that. She was told it would take 5 days for results. Note that she got frustrated at the ER and left AMA before any testing could be done. Does have contract allergy so it appears they were going to try to do a VQ scan.      Current Threshold parameters:       Today's Waveform:       Readings:           [facility] Date Wedge Pressure MEMS PAD during cath  (average of the 3 values)     7/1/2019 w/MEMS implant     20 mmHg   18 mmHg           Rosina Mays RN

## 2020-05-12 NOTE — ED AVS SNAPSHOT
[facility], [place], Emergency Department  [address] [place] [id number]  Phone:  [phone]                                    Mariel Ribeiro   MRN: [id number]    Department:  [facility], Emergency Department   Date of Visit:  5/12/2020           After Visit Summary Signature Page    I have received my discharge instructions, and my questions have been answered. I have discussed any challenges I see with this plan with the nurse or doctor.    ..........................................................................................................................................  Patient/Patient Representative Signature      ..........................................................................................................................................  Patient Representative Print Name and Relationship to Patient    ..................................................               ................................................  Date                                   Time    ..........................................................................................................................................  Reviewed by Signature/Title    ...................................................              ..............................................  Date                                               Time          22EPIC Rev 08/18

## 2020-05-13 ENCOUNTER — PATIENT OUTREACH (OUTPATIENT)
Dept: NURSING | Facility: CLINIC | Age: 75
End: 2020-05-13
Payer: MEDICARE

## 2020-05-13 DIAGNOSIS — I50.32 CHRONIC DIASTOLIC HEART FAILURE (H): Primary | ICD-10-CM

## 2020-05-13 ASSESSMENT — ACTIVITIES OF DAILY LIVING (ADL): DEPENDENT_IADLS:: SHOPPING

## 2020-05-13 NOTE — TELEPHONE ENCOUNTER
Reviewed with Yuni SCHAEFER. No renu at this time. Was seen in ER for follow up of elevated D Dimer and instructed to follow up with PCP.   Rosina Mays RN

## 2020-05-13 NOTE — DISCHARGE INSTRUCTIONS
Home.  Your oxygen level appears normal here in the ER.  Your xray did not show any new findings.  Your labs are stable.  Your COVID test was negative.  Your nuclear medicine test was low chance of blood clot.  OK home and return if any concerns at all.  See MD for follow up.    TODAY'S VISIT  YOU ARE BEING TESTED FOR COVID-19 (NOVEL CORONAVIRUS).   -  The cause of your symptoms is not yet known, but you are being tested for COVID-19.  -  It has been determined that you do not medically need to be hospitalized at this time, and  you can be monitored with self-isolation at home.     YOUR TESTS FOR THIS ILLNESS ARE CURRENTLY IN PROCESS.    -  These tests are performed by the [place] [facility].  -  Our staff will contact you with your results, likely within the next 24-72 hours.  -  If your test is positive, you will also be contacted by the [place] [facility].   -  Please remain under home isolation precautions until your healthcare provider and/or state and local health department tell you it is safe, and you no longer need to self-isolate at home.     SELF-ISOLATION INSTRUCTIONS  STAY HOME. Do not go to work, school, or public areas. Avoid using public transportation, ride-sharing (Uber/Lyft), or taxis. You should only leave your home if you require medical attention (See instructions below, please contact your provider first.)   SEPARATE YOURSELF FROM OTHER PEOPLE AND ANIMALS. As much as possible, you should stay in a specific room and away from other people in your home. You should use a separate bathroom, if available. Avoid contact with pets and other animals. When possible, have another household member care for your  family and animals while you are sick.   DO NOT SHARE HOUSEHOLD ITEMS. Do not share dishes, drinking glasses, eating utensils, towels, bedding, etc., with others or pets in your home. These items should be washed with soap and water.   WEAR A FACEMASK. Wear a facemask if  you need to be around other people, and cover your mouth and nose with tissue when you cough or sneeze.  WASH YOUR HANDS OFTEN. Wash your hands with soap and water for at least 20 second, or use hand  regularly. Avoid touching your face with unwashed hands.     SELF-MONITORING INSTRUCTIONS  SEEK PROMPT MEDICAL ATTENTION IF YOUR ILLNESS IS WORSENING. Watch closely for new or worsening symptoms, such as fever, cough, shortness of breath or difficulty breathing.   SIGN UP FOR Visibiz. Sign up for the [employer] application, using the information at the end of this document. Your results and a message about those results can be sent through Visibiz. If you do not have Constructt, we will call you with your results, but it may take longer.  IF YOU NEED MEDICAL CARE OR HAVE A MEDICAL APPOINTMENT (and it is not an emergency):   -  CALL YOUR HEALTHCARE PROVIDER BEFORE GOING TO THE [facility]  -  TELL THEM THAT YOU ARE BEING TESTING FOR AND MAY HAVE COVID-19.  YOUR CLOSE CONTACTS SHOULD MONITOR THEIR HEALTH. If your close contacts develop symptoms, they should visit [email or web address] to determine if testing is needed, and where this is done.   If you have any questions, please contact your usual health care provider or the [facility] ([facility]) at [phone]    EMERGENCY INSTRUCTIONS  IF YOU NEED EMERGENCY MEDICAL ATTENTION, CALL 911 AND LET THEM KNOW YOU MAY HAVE ARE BEING EVALUATED FOR COVID-19.      WHAT IS COVID-19 (CORONAVIRUS)  COVID-19 is a viral respiratory illness caused by a newly identified coronavirus that was discovered in late 2019 in [place]. Coronaviruses are a large family of viruses. Some coronaviruses cause illnesses in people and others only circulate among animals. Rarely, animal coronaviruses can evolve and infect people. The virus causing COVID-19 may have emerged from an animal source, and it is now able to spread from person to person.     How does it spread?   The virus is  thought to spread between people who are in close contact (within about six feet) through respiratory droplets produced when an infected person coughs, sneezes, or talks. It may also spread when one person touches a surface or object that has the virus on it and then touches their mouth, nose, or eyes. However, this is not thought to be the main way the virus is transmitted.    SYMPTOMS  This coronavirus causes mild to severe respiratory illness with often with fever, cough, and/or difficulty breathing. After exposure, symptoms typically present within 2 to 14 days.     TREATMENTS AND PREVENTION  Patients may also be asked to self-quarantine at home in order to prevent the spread of the coronavirus. There is no antiviral treatment recommended for COVID-19. People with COVID-19 may receive supportive care to help relieve symptoms.    Prevention  No vaccine is currently available for the coronavirus causing COVID-19. The best way to prevent spread of the illness is to avoid exposure through simple precautions. Prevention steps include:   Stay home if you're feeling sick.   Avoid close contact with others, and try to stay at least 6-feet away from others if you're ill.   Wash your hands often with soap and warm water for at least 20 seconds, especially after going to the bathroom; before eating; and after blowing your nose, coughing, or sneezing. Use an alcohol-based hand  with at least 60 percent alcohol if soap and water are not readily available.   Clean and disinfect frequently touched objects and surfaces using a regular household cleaning spray or wipe.     Thank you for your understanding and cooperation.      Results for orders placed or performed during the hospital encounter of 05/12/20   XR Chest Port 1 View     Status: None    Narrative    Chest    INDICATION: Shortness of breath    COMPARISON: 11/11/2019    FINDINGS: Heart size and shape appear normal. There is atherosclerotic  calcification of  the aortic arch. Median sternotomy again noted.  Azygos fissure again noted in the right upper lung.      Impression    IMPRESSION: Aortic atherosclerosis. Median sternotomy. No acute  abnormalities evident.    ASHLYN HIGH MD   Lung perfusion scan (NM)     Status: None    Narrative    Examination:  Long perfusion scan 5/12/2020       Date:  5/12/2020 6:13 PM     Indication:    covid potential with sob elevated d dimer  allergic to  iv contrast. To ER for v/q scan to rule out PE.     Previous Study: Chest radiograph same day     Additional Information: none    Technique:    The patient received 0.01 mCi of Tc-99m labeled MAA intravenously.  Anterior and posterior quantitative views were obtained of the lungs  using a dual headed camera camera. A standard eight view lung  perfusion scan was also obtained. Calculations were performed using  the geometric mean technique.    Findings:    There were no previous studies for comparison.     The perfusion images demonstrate unremarkable perfusion bilaterally.    The quantitative evaluation shows that there is 61% function on the  right as compared to 39% function on the left.     Within the right lung there is approximately 14% in the upper 1/3, 32%  in the middle 1/3, and 15% in the lower 1/3.     Within the left lung there is approximately 14% in the upper 1/3, 20%  in the middle 1/3, and 4% in the lower 1/3.      Impression    Impression:    Low probability for pulmonary embolism. Quantitative evaluation shows  61% contribution of the right lung as compared to 39% contribution of  the left lung.    I have personally reviewed the examination and initial interpretation  and I agree with the findings.    DAGOBERTO NELSON MD   CBC with platelets differential     Status: Abnormal   Result Value Ref Range    WBC 5.6 4.0 - 11.0 10e9/L    RBC Count 4.41 3.8 - 5.2 10e12/L    Hemoglobin 12.8 11.7 - 15.7 g/dL    Hematocrit 39.9 35.0 - 47.0 %    MCV 91 78 - 100 fl    MCH  29.0 26.5 - 33.0 pg    MCHC 32.1 31.5 - 36.5 g/dL    RDW 12.6 10.0 - 15.0 %    Platelet Count 125 (L) 150 - 450 10e9/L    Diff Method Automated Method     % Neutrophils 60.8 %    % Lymphocytes 26.5 %    % Monocytes 8.3 %    % Eosinophils 3.4 %    % Basophils 0.5 %    % Immature Granulocytes 0.5 %    Nucleated RBCs 0 0 /100    Absolute Neutrophil 3.4 1.6 - 8.3 10e9/L    Absolute Lymphocytes 1.5 0.8 - 5.3 10e9/L    Absolute Monocytes 0.5 0.0 - 1.3 10e9/L    Absolute Eosinophils 0.2 0.0 - 0.7 10e9/L    Absolute Basophils 0.0 0.0 - 0.2 10e9/L    Abs Immature Granulocytes 0.0 0 - 0.4 10e9/L    Absolute Nucleated RBC 0.0    Partial thromboplastin time     Status: None   Result Value Ref Range    PTT 27 22 - 37 sec   INR     Status: None   Result Value Ref Range    INR 1.01 0.86 - 1.14   CRP inflammation     Status: Abnormal   Result Value Ref Range    CRP Inflammation 22.0 (H) 0.0 - 8.0 mg/L   Erythrocyte sedimentation rate auto     Status: Abnormal   Result Value Ref Range    Sed Rate 33 (H) 0 - 30 mm/h   Comprehensive metabolic panel     Status: Abnormal   Result Value Ref Range    Sodium 138 133 - 144 mmol/L    Potassium 3.8 3.4 - 5.3 mmol/L    Chloride 104 94 - 109 mmol/L    Carbon Dioxide 29 20 - 32 mmol/L    Anion Gap 5 3 - 14 mmol/L    Glucose 146 (H) 70 - 99 mg/dL    Urea Nitrogen 44 (H) 7 - 30 mg/dL    Creatinine 1.53 (H) 0.52 - 1.04 mg/dL    GFR Estimate 33 (L) >60 mL/min/[1.73_m2]    GFR Estimate If Black 38 (L) >60 mL/min/[1.73_m2]    Calcium 9.9 8.5 - 10.1 mg/dL    Bilirubin Total 0.5 0.2 - 1.3 mg/dL    Albumin 3.7 3.4 - 5.0 g/dL    Protein Total 7.2 6.8 - 8.8 g/dL    Alkaline Phosphatase 113 40 - 150 U/L    ALT 58 (H) 0 - 50 U/L    AST 31 0 - 45 U/L   Troponin I     Status: None   Result Value Ref Range    Troponin I ES <0.015 0.000 - 0.045 ug/L   Nt probnp inpatient (BNP)     Status: None   Result Value Ref Range    N-Terminal Pro BNP Inpatient 530 0 - 900 pg/mL   COVID-19 Virus (Coronavirus) by PCR  Nasopharyngeal     Status: None    Specimen: Nasopharyngeal   Result Value Ref Range    COVID-19 Virus PCR to [facility] - Source Nasopharyngeal     COVID-19 Virus PCR to [facility] - Result       Test received-See reflex to IDDL test SARS CoV2 (COVID-19) Virus RT-PCR   SARS-CoV-2 COVID-19 Virus (Coronavirus) RT-PCR Nasopharyngeal     Status: None    Specimen: Nasopharyngeal   Result Value Ref Range    SARS-CoV-2 Virus Specimen Source Nasopharyngeal     SARS-CoV-2 PCR Result NEGATIVE     SARS-CoV-2 PCR Comment       This automated, real-time RT-PCR assay by [employer] on the [employer] has   been given Emergency Use Authorization (EUA) for the in vitro qualitative detection of RNA   from the SARS-CoV2 virus in nasopharyngeal swabs in viral transport medium from patients   with signs and symptoms of infection who are suspected of COVID-19. The performance is   unknown in asymptomatic patients.     EKG 12-lead, tracing only     Status: None   Result Value Ref Range    Interpretation ECG Click View Image link to view waveform and result

## 2020-05-13 NOTE — PROGRESS NOTES
Clinic Care Coordination Contact  [facility]/Voicemail    Referral Source: Pro-Active Outreach  Clinical Data: Care Coordinator Outreach  Outreach attempted x 1.  Per roommate, Odalis, patient is sleeping. Had a long night in the ED yesterday but Odalis states patient is relieved to have the testing to determine that she does not have a PE or blood clot. She remains concerned because she still can't explain why her home O2 saturation readings were 80 and 81% on Sunday.  Plan: Care Coordinator will try to reach patient again later this afternoon or tomorrow.    Vibha Carvajal RN  [facility] Primary Care-Care Coordination  [facility] Primary Care  [facility]  [phone]

## 2020-05-14 ENCOUNTER — PATIENT OUTREACH (OUTPATIENT)
Dept: NURSING | Facility: CLINIC | Age: 75
End: 2020-05-14
Payer: MEDICARE

## 2020-05-14 NOTE — PROGRESS NOTES
Clinic Care Coordination Contact    Follow Up Progress Note      Assessment: patient returned to ED for VQ scan as recommended by previous ED MD the day before but she left AMA prior to completion. VQ negative for PE and she is relieved by this information. Fatigued today. No new shortness of breath. Received a ZIO-patch monitor in the mail yesterday but she doesn't know why she received it. She sent a Vereniumhart message to her cardiology team asking for clarification. She also wants confirmation that it won't interfere with the CardioMEMs monitor she already has in place. Clinic community paramedic outreached to me asking if patient would be interested in CP services. Reviewed with patient and she is interested although would prefer all in-person visits to take place outside. No other concerns today per patient    Goals addressed this encounter:   Goals Addressed                 This Visit's Progress      Increase physical activity (pt-stated)   Not on track     1-Goal Statement: I want to return to riding my [employer] bike to help increase the strength in my legs within 3 months   Date Goal set: 4/6/2020  Barriers: recent injury to coccyx from fall is causing pain that interferes with her ability to walk comfortably  Strengths: feels motivated currently to improve her strength and mobility  Date to Achieve By: 7/1/2020  Patient expressed understanding of goal: yes  Action steps to achieve this goal:  1. I will do chair stretches to improve flexibility and reduce coccyx pain  2. I will walk from the back of the house to the front and back again using my cane if my roommate isn't working in the living room    5-4-2020: able to walk in the home but unable to proceed with returning to the bike due to coccyx pain or doing chair exercises until she get a pillow that she ordered for coccyx support            Monitoring (pt-stated)   On track     2-Goal Statement: Goal Statement: I want to maintain my weight so that it  meets the weight parameter set by my cardiologist and CORE clinic team for the next six months  Date Goal set: 5-8-2020  Barriers: at times forgets to weigh herself or write it down  Strengths: states that her roommate can remind her  Date to Achieve By: 10/1/2020  Patient expressed understanding of goal: yes  Action steps to achieve this goal:  1. I will ask my roommate to remind me to weigh myself and record the weight daily  2. I will call my clinic or CORE clinic with a daily weight gain over 2 lbs in one day or 5 lbs in one week  3. I will update my cardiology team with my recorded weights at my appointments    5-4-2020: hasn't been able to weigh herself for two weeks but just got new batteries for her scale and she is able to weigh herself again             Intervention/Education provided during outreach: called CardioMEMs rep and asked that they contact patient to discuss who she should contact with questions about her monitor during off business hours     Outreach Frequency: monthly    Plan:   Community paramedic to outreach to patient for introduction. CardioMEMs rep to call patient to discuss off hours contact coverage. Patient to await response from cardiology team regarding plan for Zio-patch monitor  Care Coordinator will follow up in 2 weeks    Vibha Carvajal RN  [facility] Primary Care-Care Coordination  [facility]-Integrated Primary Care  [facility]  [phone]

## 2020-05-15 ENCOUNTER — PATIENT OUTREACH (OUTPATIENT)
Dept: CARE COORDINATION | Facility: CLINIC | Age: 75
End: 2020-05-15

## 2020-05-15 DIAGNOSIS — I50.9 CHF (CONGESTIVE HEART FAILURE) (H): Primary | ICD-10-CM

## 2020-05-15 NOTE — PROGRESS NOTES
Clinic Care Coordination Contact  Care Team Conversations    Community paramedic referral entered per request of community paramedic.    Vibha Carvajal RN  [facility] Primary Care-Care Coordination  [facility] Primary Care  [facility]  [phone]

## 2020-05-18 ENCOUNTER — PATIENT OUTREACH (OUTPATIENT)
Dept: BEHAVIORAL HEALTH | Facility: CLINIC | Age: 75
End: 2020-05-18

## 2020-05-18 NOTE — PROGRESS NOTES
The Community Paramedic reached out and left a voicemail for possible enrollment into the program.  The Community Paramedic will reach out again in 2-3 business days if no return call.    Rafaela ROMAN NRP  Community Paramedic  Phone number [phone]  Email [email or web address]

## 2020-05-19 ENCOUNTER — PATIENT OUTREACH (OUTPATIENT)
Dept: BEHAVIORAL HEALTH | Facility: CLINIC | Age: 75
End: 2020-05-19

## 2020-05-19 ENCOUNTER — PATIENT OUTREACH (OUTPATIENT)
Dept: CARDIOLOGY | Facility: CLINIC | Age: 75
End: 2020-05-19

## 2020-05-19 ASSESSMENT — ACTIVITIES OF DAILY LIVING (ADL): DEPENDENT_IADLS:: SHOPPING

## 2020-05-19 NOTE — TELEPHONE ENCOUNTER
[facility] CORE Clinic - CardioMEMS Reading Review      CardioMEMS reviewed and routed to patient's provider, Yuni SCHAEFER.   Current diuretic: Bumex 4 mg BID  Recent plan of care changes: none    Current Threshold parameters:       Today's Waveform:       Readings:           [facility] Date Wedge Pressure MEMS PAD during cath  (average of the 3 values)     7/1/2019 w/MEMS implant     20 mmHg   18 mmHg           Rosina Mays RN

## 2020-05-20 NOTE — PROGRESS NOTES
The Community Paramedic reached out to the patient to discuss enrollment in the program.  We discussed that the program is not currently seeing patients face to face but is supporting the patients via telephone at this time.  The patient would like enrollment with the hope of home visits soon.    The Community Paramedic will reach out every other week until home visits are allowed and then as needed.    Rafaela ROMAN, SAMANTHAP  Community Paramedic  Phone number [phone]  Email [email or web address]

## 2020-05-21 ENCOUNTER — VIRTUAL VISIT (OUTPATIENT)
Dept: ONCOLOGY | Facility: CLINIC | Age: 75
End: 2020-05-21
Attending: SURGERY
Payer: MEDICARE

## 2020-05-21 DIAGNOSIS — Z17.0 MALIGNANT NEOPLASM OF LOWER-INNER QUADRANT OF LEFT BREAST IN FEMALE, ESTROGEN RECEPTOR POSITIVE (H): Primary | ICD-10-CM

## 2020-05-21 DIAGNOSIS — C50.312 MALIGNANT NEOPLASM OF LOWER-INNER QUADRANT OF LEFT BREAST IN FEMALE, ESTROGEN RECEPTOR POSITIVE (H): Primary | ICD-10-CM

## 2020-05-21 NOTE — PROGRESS NOTES
"Mariel Ribeiro is a 74 year old female who is being evaluated via a billable video visit.      The patient has been notified of following:     \"This video visit will be conducted via a call between you and your physician/provider. We have found that certain health care needs can be provided without the need for an in-person physical exam.  This service lets us provide the care you need with a video conversation.  If a prescription is necessary we can send it directly to your pharmacy.  If lab work is needed we can place an order for that and you can then stop by our lab to have the test done at a later time.    Video visits are billed at different rates depending on your insurance coverage.  Please reach out to your insurance provider with any questions.    If during the course of the call the physician/provider feels a video visit is not appropriate, you will not be charged for this service.\"    Patient has given verbal consent for Video visit? Yes    How would you like to obtain your AVS? MyChart    Patient would like the video invitation sent by: Send to e-mail at: [email or web address]      Will anyone else be joining your video visit? No      I have reviewed and updated the patient's allergies and medication list.    Concerns: itching on left breast on opposite side of incision      Joellen Dominguez LPN        Video-Visit Details    Type of service:  Video Visit    Visit converted to telephone due to connection issues:      The above were completed by the medical assistant for the visit.    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    Cancer Staging  Malignant neoplasm of lower-inner quadrant of left breast in female, estrogen receptor positive (H)  Staging form: Breast, AJCC 8th Edition  - Clinical: Stage 0 (cTis (DCIS), cN0, cM0, G2, ER+, CO+, HER2: Not Assessed) - Signed by Anjelica Gonzales MD on " 11/21/2019    HPI:    I met Mariel Ribeiro in November 2019 for a new diagnosis of DCIS.  At that time, declined to undergo surgery. I had recommended a medical oncology referral for endocrine therapy, but she did not have this consultation.  We had also planned a 6-month follow up diagnostic mammogram on the LEFT to follow the area, but she did not have that done either.    Since her last visit, she reports increasing itching in the medial left breast, over the site of the biopsy. She denies any masses in either breast or axilla or any nipple discharge or inversion    INVESTIGATIONS:  Patient declined follow up mammogram due to COVID concerns.    Assessment/Plan:  Diagnoses       Codes Comments    Malignant neoplasm of lower-inner quadrant of left breast in female, estrogen receptor positive (H)    -  Primary C50.312, Z17.0            ASSESSMENT:  Mariel Ribeiro is a 74 year old female with DCIS who opted for non-surgical management.    She has some concerns and I asked her to come in for an exam. She declined. I also asked her to have her 6 month follow up mammogram. She declined. This is because of COVID-related concerns.   I reviewed with her that the order for the mammogram is in place, and she can come in for that evaluation once she feels comfortable in the setting of COVID risks.    I did recommend for her to have a medical oncology evaluation and indicated that this could be done virtually.  She was agreeable to this.    All of the above was discussed and all questions were answered.    PLAN:  1. Patient declined to come in for mammogram or in person visit   2. Follow up with me in 6 months (in person visit for an exam)  3. Patient did not have the medical oncology referral that was recommended in November, will try to set this up again.  4. Order for LEFT mammogram in place; asked patient to come in to have this done when she feels comfortable.    Anjelica Gonzales MD MSc [facility] FACS  Assistant    Division of Surgical Oncology  [facility]     Phone call duration: 9 minutes

## 2020-05-21 NOTE — TELEPHONE ENCOUNTER
ONCOLOGY INTAKE: Records Information      APPT INFORMATION: 6/1/20 - Gus - Video  Referring provider:  SAVI Gonzales  Referring provider s clinic:  [facility]  Reason for visit/diagnosis:  DCIS  Has patient been notified of appointment date and time?: Yes    RECORDS INFORMATION:  Were the records received with the referral (via [employer])? Internal referral    Has patient been seen for any external appt for this diagnosis? No    If yes, where? NA    Has patient had any imaging or procedures outside of [place] for this condition? No      If Yes, where? NA    ADDITIONAL INFORMATION:  Scheduled via [facility] per Radha

## 2020-05-21 NOTE — LETTER
"    5/21/2020         RE: Mariel Ribeiro  [address] [place] [address]        Dear Colleague,    Thank you for referring your patient, Mariel Ribeiro, to the [facility]. Please see a copy of my visit note below.    Mariel Ribeiro is a 74 year old female who is being evaluated via a billable video visit.      The patient has been notified of following:     \"This video visit will be conducted via a call between you and your physician/provider. We have found that certain health care needs can be provided without the need for an in-person physical exam.  This service lets us provide the care you need with a video conversation.  If a prescription is necessary we can send it directly to your pharmacy.  If lab work is needed we can place an order for that and you can then stop by our lab to have the test done at a later time.    Video visits are billed at different rates depending on your insurance coverage.  Please reach out to your insurance provider with any questions.    If during the course of the call the physician/provider feels a video visit is not appropriate, you will not be charged for this service.\"    Patient has given verbal consent for Video visit? Yes    How would you like to obtain your AVS? MyChart    Patient would like the video invitation sent by: Send to e-mail at: [email or web address]      Will anyone else be joining your video visit? No      I have reviewed and updated the patient's allergies and medication list.    Concerns: itching on left breast on opposite side of incision      Joellen Dominguez LPN        Video-Visit Details    Type of service:  Video Visit    Visit converted to telephone due to connection issues:      The above were completed by the medical assistant for the visit.    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    Cancer Staging  Malignant neoplasm of " lower-inner quadrant of left breast in female, estrogen receptor positive (H)  Staging form: Breast, AJCC 8th Edition  - Clinical: Stage 0 (cTis (DCIS), cN0, cM0, G2, ER+, NE+, HER2: Not Assessed) - Signed by Anjelica Gonzales MD on 11/21/2019    HPI:    I met Mariel Ribeiro in November 2019 for a new diagnosis of DCIS.  At that time, declined to undergo surgery. I had recommended a medical oncology referral for endocrine therapy, but she did not have this consultation.  We had also planned a 6-month follow up diagnostic mammogram on the LEFT to follow the area, but she did not have that done either.    Since her last visit, she reports increasing itching in the medial left breast, over the site of the biopsy. She denies any masses in either breast or axilla or any nipple discharge or inversion    INVESTIGATIONS:  Patient declined follow up mammogram due to COVID concerns.    Assessment/Plan:  Diagnoses       Codes Comments    Malignant neoplasm of lower-inner quadrant of left breast in female, estrogen receptor positive (H)    -  Primary C50.312, Z17.0            ASSESSMENT:  Mariel Ribeiro is a 74 year old female with DCIS who opted for non-surgical management.    She has some concerns and I asked her to come in for an exam. She declined. I also asked her to have her 6 month follow up mammogram. She declined. This is because of COVID-related concerns.   I reviewed with her that the order for the mammogram is in place, and she can come in for that evaluation once she feels comfortable in the setting of COVID risks.    I did recommend for her to have a medical oncology evaluation and indicated that this could be done virtually.  She was agreeable to this.    All of the above was discussed and all questions were answered.    PLAN:  1. Patient declined to come in for mammogram or in person visit   2. Follow up with me in 6 months (in person visit for an exam)  3. Patient did not have the medical oncology  referral that was recommended in November, will try to set this up again.  4. Order for LEFT mammogram in place; asked patient to come in to have this done when she feels comfortable.    Anjelica Gonzales MD [facility]    Division of Surgical Oncology  [facility]     Phone call duration: 9 minutes        Again, thank you for allowing me to participate in the care of your patient.        Sincerely,        Anjelica Gonzales MD

## 2020-05-22 ENCOUNTER — ALLIED HEALTH/NURSE VISIT (OUTPATIENT)
Dept: CARDIOLOGY | Facility: CLINIC | Age: 75
End: 2020-05-22
Attending: PHYSICIAN ASSISTANT
Payer: MEDICARE

## 2020-05-22 DIAGNOSIS — I50.32 CHRONIC DIASTOLIC HEART FAILURE (H): Primary | Chronic | ICD-10-CM

## 2020-05-22 PROCEDURE — 93264 REM MNTR WRLS P-ART PRS SNR: CPT | Performed by: PHYSICIAN ASSISTANT

## 2020-05-22 NOTE — TELEPHONE ENCOUNTER
RECORDS STATUS - BREAST    RECORDS REQUESTED FROM: [facility] - Internal Referral   DATE REQUESTED: 5/22/20   NOTES DETAILS STATUS   OFFICE NOTE from referring provider     OFFICE NOTE from medical oncologist     OFFICE NOTE from surgeon     OFFICE NOTE from radiation oncologist     DISCHARGE SUMMARY from hospital     DISCHARGE REPORT from the ER     OPERATIVE REPORT     MEDICATION LIST     CLINICAL TRIAL TREATMENTS TO DATE     LABS     PATHOLOGY REPORTS  (Tissue diagnosis, Stage, ER/TN percentage positive and intensity of staining, HER2 IHC, FISH, and all biopsies from breast and any distant metastasis)                     GENONOMIC TESTING     TYPE:   (Next Generation Sequencing, including Foundation One testing, and Oncotype score)     IMAGING (NEED IMAGES & REPORT)     CT SCANS     MRI     MAMMO     ULTRASOUND     PET     BONE SCAN     BRAIN MRI

## 2020-05-26 ENCOUNTER — PATIENT OUTREACH (OUTPATIENT)
Dept: CARDIOLOGY | Facility: CLINIC | Age: 75
End: 2020-05-26

## 2020-05-26 NOTE — TELEPHONE ENCOUNTER
[facility] - CardioMEMS Reading Review      CardioMEMS reviewed and routed to patient's provider, Yuni SCHAEFER.   Current diuretic: Bumex 4 mg BID  Recent plan of care changes: none    Current Threshold parameters:       Today's Waveform:       Readings:           RHC Date Wedge Pressure MEMS PAD during cath  (average of the 3 values)     7/1/2019 w/MEMS implant     20 mmHg   18 mmHg           Rosina Mays RN

## 2020-05-28 ENCOUNTER — PATIENT OUTREACH (OUTPATIENT)
Dept: NURSING | Facility: CLINIC | Age: 75
End: 2020-05-28
Payer: MEDICARE

## 2020-05-28 DIAGNOSIS — I50.32 CHRONIC DIASTOLIC HEART FAILURE (H): Primary | ICD-10-CM

## 2020-05-28 NOTE — PROGRESS NOTES
"Clinic Care Coordination Contact    Follow Up Progress Note      Assessment: pro-active outreach call placed to patient. She checks her blood pressure, weight and oxygen saturations each morning when she takes her medications and is reporting 4-5 episodes since May 9th of oxygen saturation levels at 80%. With rest and some deep breaths they typically improve to 90 but at times only improve to 83%. She reports shortness of breath at baseline with these events.  She reports the saturation levels always improve eventually. She feels \"foggy\" occasionally with the events but contributes this to her chronic migraine headaches. Blood sugars are typically in the 200's and she is following the recommendation of her endocrinologist and is now taking 33 units of lantus but she is overdue for follow up with that provider. She was recommended to schedule an appointment with a nephrologist but has not yet done so. She needs follow up with her PCP but is unclear if that provider would allow a visit by phone or not. Did have recent blood work while in ED.     Goals addressed this encounter:   Goals Addressed                 This Visit's Progress      Increase physical activity (pt-stated)   Not on track     1-Goal Statement: I want to return to riding my [employer] bike to help increase the strength in my legs within 3 months   Date Goal set: 4/6/2020  Barriers: recent injury to coccyx from fall is causing pain that interferes with her ability to walk comfortably  Strengths: feels motivated currently to improve her strength and mobility  Date to Achieve By: 7/1/2020  Patient expressed understanding of goal: yes  Action steps to achieve this goal:  1. I will do chair stretches to improve flexibility and reduce coccyx pain  2. I will walk from the back of the house to the front and back again using my cane if my roommate isn't working in the living room    5-4-2020: able to walk in the home but unable to proceed with returning to " the bike due to coccyx pain or doing chair exercises until she get a pillow that she ordered for coccyx support    5-: not reviewed             Medical (pt-stated)   On track     Goal Statement: I will attend appointments with nephrology and endocrinology specialists within the next 1-2 months  Date Goal set: 5-  Barriers: patient declines to attend in-person appointments due to COVID-19 pandemic  Strengths: willing to attend visits from home by phone  Date to Achieve By: 7-  Patient expressed understanding of goal: yes  Action steps to achieve this goal:  1. I will call the phone numbers provided to me by my RN CC to schedule the appointments  2. I will call my clinic or RN CC if I have difficulty scheduling the appointments            Monitoring (pt-stated)   On track     2-Goal Statement: Goal Statement: I want to maintain my weight so that it meets the weight parameter set by my cardiologist and CORE clinic team for the next six months  Date Goal set: 5-8-2020  Barriers: at times forgets to weigh herself or write it down  Strengths: states that her roommate can remind her  Date to Achieve By: 10/1/2020  Patient expressed understanding of goal: yes  Action steps to achieve this goal:  1. I will ask my roommate to remind me to weigh myself and record the weight daily  2. I will call my clinic or C[facility] clinic with a daily weight gain over 2 lbs in one day or 5 lbs in one week  3. I will update my cardiology team with my recorded weights at my appointments    5-4-2020: hasn't been able to weigh herself for two weeks but just got new batteries for her scale and she is able to weigh herself again             Intervention/Education provided during outreach: reviewed importance of scheduling nephrology visit given rise in creatinine. RN CC provided phone numbers for scheduling.     Creatinine   Date Value Ref Range Status   05/12/2020 1.53 (H) 0.52 - 1.04 mg/dL Final          Outreach Frequency:  monthly    Plan:   Patient to schedule specialty appointments. RN CC to ask PCP for plan for low oxygen saturation readings. RN CC to ask PCP if she will allow phone visit with patient  Care Coordinator will follow up with plan once known, otherwise monthly    Vibha Carvajal RN  [facility]-Care Coordination  [facility]  [facility]  [phone]
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
## 2020-05-29 ENCOUNTER — PATIENT OUTREACH (OUTPATIENT)
Dept: BEHAVIORAL HEALTH | Facility: CLINIC | Age: 75
End: 2020-05-29

## 2020-05-29 NOTE — PROGRESS NOTES
The Community Paramedic left a message for a call back to make an appointment for a home visit.    Rafaela ROMAN, SAMANTHAP  Community Paramedic  Phone number [phone]  Email [email or web address]

## 2020-06-01 ENCOUNTER — PRE VISIT (OUTPATIENT)
Dept: ONCOLOGY | Facility: CLINIC | Age: 75
End: 2020-06-01

## 2020-06-01 ENCOUNTER — VIRTUAL VISIT (OUTPATIENT)
Dept: ONCOLOGY | Facility: CLINIC | Age: 75
End: 2020-06-01
Attending: SURGERY
Payer: MEDICARE

## 2020-06-01 DIAGNOSIS — D05.12 DUCTAL CARCINOMA IN SITU (DCIS) OF LEFT BREAST: Primary | ICD-10-CM

## 2020-06-01 DIAGNOSIS — C50.312 MALIGNANT NEOPLASM OF LOWER-INNER QUADRANT OF LEFT BREAST IN FEMALE, ESTROGEN RECEPTOR POSITIVE (H): ICD-10-CM

## 2020-06-01 DIAGNOSIS — Z17.0 MALIGNANT NEOPLASM OF LOWER-INNER QUADRANT OF LEFT BREAST IN FEMALE, ESTROGEN RECEPTOR POSITIVE (H): ICD-10-CM

## 2020-06-01 PROCEDURE — 99443 ZZC PHYSICIAN TELEPHONE EVALUATION 21-30 MIN: CPT | Mod: GC | Performed by: INTERNAL MEDICINE

## 2020-06-01 PROCEDURE — 40000114 ZZH STATISTIC NO CHARGE CLINIC VISIT

## 2020-06-01 RX ORDER — ANASTROZOLE 1 MG/1
1 TABLET ORAL DAILY
Qty: 30 TABLET | Refills: 11 | Status: SHIPPED | OUTPATIENT
Start: 2020-06-01 | End: 2020-12-28
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
## 2020-06-02 ENCOUNTER — PATIENT OUTREACH (OUTPATIENT)
Dept: BEHAVIORAL HEALTH | Facility: CLINIC | Age: 75
End: 2020-06-02

## 2020-06-02 NOTE — PROGRESS NOTES
Home visit scheduled with the Community Paramedic.    Rafaela ROMAN, NRP  Community Paramedic  Phone number [phone]  Email [email or web address]

## 2020-06-04 ENCOUNTER — PATIENT OUTREACH (OUTPATIENT)
Dept: CARDIOLOGY | Facility: CLINIC | Age: 75
End: 2020-06-04

## 2020-06-04 DIAGNOSIS — N18.30 TYPE 2 DIABETES MELLITUS WITH STAGE 3 CHRONIC KIDNEY DISEASE, WITH LONG-TERM CURRENT USE OF INSULIN (H): ICD-10-CM

## 2020-06-04 DIAGNOSIS — Z79.4 TYPE 2 DIABETES MELLITUS WITH STAGE 3 CHRONIC KIDNEY DISEASE, WITH LONG-TERM CURRENT USE OF INSULIN (H): ICD-10-CM

## 2020-06-04 DIAGNOSIS — E11.22 TYPE 2 DIABETES MELLITUS WITH STAGE 3 CHRONIC KIDNEY DISEASE, WITH LONG-TERM CURRENT USE OF INSULIN (H): ICD-10-CM

## 2020-06-04 NOTE — TELEPHONE ENCOUNTER
Reviewed with Yuni SCHAEFER. No changes at this time. Called Mariel to review her numbers and she verbalized understanding.   Rosina Mays RN

## 2020-06-04 NOTE — TELEPHONE ENCOUNTER
[facility] - CardioMEMS Reading Review      CardioMEMS reviewed and routed to patient's provider, Yuni SCHAEFER.   Current diuretic: Bumex 4 mg BID  Recent plan of care changes: none    Current Threshold parameters:       Today's Waveform:       Readings:           RHC Date Wedge Pressure MEMS PAD during cath  (average of the 3 values)     7/1/2019 w/MEMS implant     20 mmHg   18 mmHg           Rosina Mays RN

## 2020-06-09 ENCOUNTER — PATIENT OUTREACH (OUTPATIENT)
Dept: CARDIOLOGY | Facility: CLINIC | Age: 75
End: 2020-06-09

## 2020-06-09 NOTE — TELEPHONE ENCOUNTER
[facility] - CardioMEMS Reading Review      CardioMEMS reviewed and routed to patient's provider, Yuni SCHAEFER.   Current diuretic: BUmex 4 mg BID  Recent plan of care changes: none  Current Threshold parameters:       Today's Waveform:       Readings:           RHC Date Wedge Pressure MEMS PAD during cath  (average of the 3 values)     7/1/2019 w/MEMS implant     20 mmHg   18 mmHg           Rosina Mays RN

## 2020-06-10 ENCOUNTER — PATIENT OUTREACH (OUTPATIENT)
Dept: SURGERY | Facility: CLINIC | Age: 75
End: 2020-06-10

## 2020-06-10 NOTE — TELEPHONE ENCOUNTER
Surgical Oncology RN Care Coordination Note:     Patient contacted by scheduling to arrange mammogram that was recommended by Dr. Gonzales to be done ASAP and not delayed as she previously delayed last recommendation. Patient still does not want to schedule at this time d/t COVID 19 and declined further calls to arrange.     Ellen Hdz, RN, BSN  Care Coordinator   [phone]

## 2020-06-10 NOTE — TELEPHONE ENCOUNTER
Reviewed with Yuni SCHAEFER. Called Mariel back with following recommendations.     Date: 6/10/2020    Time of Call: 10:05 AM     Diagnosis:  HFpEF     VORB:  Ordering provider: Yuni SCHAEFER  Order: Decrease Bumex to 4 mg in the AM and 3 mg in the PM for 2 days. Then go back to 4 mg twice daily.      Order received by: Rosina Mays RN      Follow-up/additional notes: Reviewed with Mariel who verbalized understanding. Will go back to 4 mg BID on Friday and will continue to monitor Cardiomems.

## 2020-06-12 ENCOUNTER — ALLIED HEALTH/NURSE VISIT (OUTPATIENT)
Dept: BEHAVIORAL HEALTH | Facility: CLINIC | Age: 75
End: 2020-06-12
Payer: COMMERCIAL

## 2020-06-12 VITALS
RESPIRATION RATE: 12 BRPM | TEMPERATURE: 97.3 F | WEIGHT: 293 LBS | DIASTOLIC BLOOD PRESSURE: 62 MMHG | BODY MASS INDEX: 52.21 KG/M2 | SYSTOLIC BLOOD PRESSURE: 114 MMHG | HEART RATE: 83 BPM | OXYGEN SATURATION: 94 %

## 2020-06-12 DIAGNOSIS — J44.9 COPD (CHRONIC OBSTRUCTIVE PULMONARY DISEASE) (H): Primary | ICD-10-CM

## 2020-06-12 PROCEDURE — 99207 C COMMUNITY PARAMEDIC - PATIENT NOT BILLABLE: CPT

## 2020-06-12 ASSESSMENT — ACTIVITIES OF DAILY LIVING (ADL): DEPENDENT_IADLS:: SHOPPING

## 2020-06-13 NOTE — PROGRESS NOTES
Community Paramedics Initial Visit  June 13, 2020 TIME:1400    Mariel Ribeiro is a 75 year old female being seen at home for a Community Paramedic Home visit.    Present at appointment: Patient         Chief Complaint   Patient presents with     Outreach       [place] Utilization:      Utilization    Last refreshed: 6/13/2020  4:16 PM:  Hospital Admissions 2           Last refreshed: 6/13/2020  4:16 PM:  ED Visits 3           Last refreshed: 6/13/2020  4:16 PM:  No Show Count (past year) 3              Current as of: 6/13/2020  4:16 PM              Clinical Concerns:  Current Medical Concerns: Type II Diabetes, COPD    Current Behavioral Concerns: N/A    Education Provided to patient: N/A     Vitals: /62   Pulse 83   Temp 97.3  F (36.3  C)   Resp 12   Wt 146.7 kg (323 lb 8 oz)   SpO2 94%   BMI 52.21 kg/m    BMI: Body mass index is 52.21 kg/m .       Health Maintenance Reviewed: Due/Overdue  Yes    Clinical Pathway: COPD    Review of Symptoms/PE    Skin: negative  Eyes: negative  Ears/Nose/Throat: negative  Respiratory: No shortness of breath  Cardiovascular: negative  Gastrointestinal: negative  Genitourinary: negative  Musculoskeletal: negative  Neurologic: negative  Psychiatric: negative    Medication Management:    Medications need to be refilled:no    Medications set up? No  Pill Box issued: No  Scale issued: No    Functional Status:  Dependent ADLs:: Ambulation-cane, Ambulation-walker  Dependent IADLs:: Shopping  Bed or wheelchair confined:: No  Mobility Status: Independent w/Device    Living Situation:  Current living arrangement:: Other(i live in a house with a roommate)  Type of residence:: Private home - stairs(unknown)    Community Paramedics Home Safety Assessment - Not done - Due to COVID Pt wanted to meet outside in her [place]      Diet/Exercise/Sleep:       Transportation:           Psychosocial:  Moravian or spiritual beliefs that impact treatment:: No  Mental health DX:: Yes  Mental  health DX how managed:: Medication  Mental health management concern (GOAL):: No  Informal Support system:: Friends    [facility] Healthy Days Survey - Healthy Days Survey - (Centers for Disease Control and Prevention - Used with Permission.)  Would you say that in general your health is: : Fair  Now thinking about your physical health, which includes physical illness and injury, for how many days during the past 30 days was your physical health not good?: 30(Pt fell and hurt her tail bone and has not been able to do her normal functioning physically activities since)  Now thinking about your mental health, which includes stress, depression, and problems with emotions, for how many days during the past 30 days was your mental health not good?: 10  During the past 30 days, for about how many days did poor physical or mental health keep you from doing your usual activities, such as self-care, work, or recreation?: 30  CHDS SCORE: 40    No  Face to Face in Home / Community    Today's PHQ-2 Score:      Today's PHQ-9 Score:   PHQ-9 SCORE 2/27/2020   PHQ-9 Total Score -   PHQ-9 Total Score MyChart -   PHQ-9 Total Score 8        Today's GAD7 score:   XU-7 SCORE 2/27/2020   Total Score -   Total Score 2            Financial/Insurance:           Resources and Interventions:  Current Resources:    ;   Community Resources: [facility]  Supplies used at home:: None  Equipment Currently Used at Home: cane, straight, walker, rolling    Advance Care Plan/Directive  Advanced Care Plans/Directives on file:: Yes  Type Advanced Care Plans/Directives: Advanced Directive - On File  Advanced Care Plan/Directive Status: Not Applicable    Referrals Placed: None     HEALTH CARE GOALS:      Patient Active Problem List   Diagnosis     Hypothyroidism     Diverticulitis of colon     Coronary atherosclerosis     Myalgia and myositis     Migraine     Chronic airway obstruction/ COPD     Mononeuritis     Obstructive sleep apnea     Vitamin D deficiency      Hypertension goal BP (blood pressure) < 130/80     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)     Hyperlipidemia with target LDL less than 70     Chronic diastolic heart failure (H)     Osteoarthritis     Morbid obesity (H)     Arthritis of knee     Transient cerebral ischemia     History of thyroid cancer     Cervicalgia     Fatty liver disease, nonalcoholic     Hepatomegaly     Angina at rest (H)     S/P CABG x 3     Type 2 diabetes mellitus with stage 3 chronic kidney disease, with long-term current use of insulin (H)     Lymphedema     Lower back pain     Bilateral carotid artery disease (H)     Spinal stenosis of lumbar region, unspecified whether neurogenic claudication present     Status post coronary angiogram     Hemoptysis     Intertrigo     Malignant neoplasm of lower-inner quadrant of left breast in female, estrogen receptor positive (H)         Current Outpatient Medications:      acetaminophen (TYLENOL) 325 MG tablet, Take 650 mg by mouth every 4 hours as needed for mild pain Take 2 tablets by mouth every 4 hours as needed for mild pain, Disp: 100 tablet, Rfl:      albuterol (PROAIR HFA) 108 (90 Base) MCG/ACT inhaler, Inhale 1-2 puffs into the lungs every 4 hours as needed for wheezing, Disp: 8.5 g, Rfl:      anastrozole (ARIMIDEX) 1 MG tablet, Take 1 tablet (1 mg) by mouth daily, Disp: 30 tablet, Rfl: 11     aspirin (ASA) 81 MG EC tablet, Take 1 tablet (81 mg) by mouth daily, Disp: , Rfl:      atorvastatin (LIPITOR) 40 MG tablet, TAKE 1 TABLET(40 MG) BY MOUTH DAILY, Disp: 90 tablet, Rfl: 3     bumetanide (BUMEX) 1 MG tablet, As directed marcy CORE, continue current dose of 4 mg in the morning, 2 mg in the afternoon, and 2 mg in the evening, Disp: 360 tablet, Rfl: 3     buPROPion (WELLBUTRIN XL) 150 MG 24 hr tablet, Take 1 tablet (150 mg) by mouth every morning, Disp: 30 tablet, Rfl: 3     capsaicin (ZOSTRIX) 0.025 % external cream, Apply 1 g topically 3 times daily For neuropathic pain., Disp: 60 g,  Rfl: 1     EPINEPHrine (EPIPEN/ADRENACLICK/OR ANY BX GENERIC EQUIV) 0.3 MG/0.3ML injection 2-pack, Inject 0.3 mLs (0.3 mg) into the muscle once as needed for anaphylaxis, Disp: 0.6 mL, Rfl: 0     escitalopram (LEXAPRO) 20 MG tablet, TAKE 1 TABLET(20 MG) BY MOUTH EVERY MORNING, Disp: 90 tablet, Rfl: 3     ferrous gluconate (FERGON) 324 (38 Fe) MG tablet, Take 1 tablet (324 mg) by mouth Every Mon, Wed, Fri Morning, Disp: 36 tablet, Rfl: 3     folic acid (FOLVITE) 1 MG tablet, Take 2 tablets (2 mg) by mouth daily, Disp: , Rfl:      guaiFENesin (MUCINEX) 600 MG 12 hr tablet, Take 1 tablet (600 mg) by mouth 2 times daily, Disp: , Rfl:      insulin glargine (LANTUS SOLOSTAR) 100 UNIT/ML pen, Inject  33 units  daily subcutaneously.  call  if blood sugar <70, often >200., Disp: 15 mL, Rfl: 0     levothyroxine (SYNTHROID) 150 MCG tablet, Take 1 tablet (150 mcg) by mouth daily, Disp: 90 tablet, Rfl: 3     [employer] FINEPOINT LANCETS MISC, Use to test blood sugars 2 times daily or as directed., Disp: 100 each, Rfl: prn     lifitegrast (XIIDRA) 5 % opthalmic solution, Place 1 drop into both eyes 2 times daily For additional refills, please call [phone] and make an appointment to be seen by a provider, Disp: 60 each, Rfl: 3     losartan (COZAAR) 50 MG tablet, Take 0.5 tablets (25 mg) by mouth 2 times daily, Disp: 90 tablet, Rfl: 0     niacin ER (NIASPAN) 500 MG CR tablet, TAKE 1 TABLET(500 MG) BY MOUTH TWICE DAILY, Disp: 180 tablet, Rfl: 3     nitroGLYcerin (NITROSTAT) 0.4 MG sublingual tablet, For chest pain place 1 tablet under the tongue every 5 minutes for 3 doses. If symptoms persist 5 minutes after 1st dose call 911., Disp: 25 tablet, Rfl: 0     ONE TOUCH ULTRA (DEVICES) MISC, test blood sugar BID, Disp: 1, Rfl: 0     ONETOUCH ULTRA test strip, USE FOUR TIMES DAILY, Disp: 400 strip, Rfl: 1     potassium chloride ER (KLOR-CON M) 10 MEQ CR tablet, Take 2 tablets (20 mEq) by mouth 2 times daily, Disp: 120 tablet, Rfl:  "4     pregabalin (LYRICA) 100 MG capsule, Take 1 capsule (100 mg) by mouth 2 times daily, Disp: 180 capsule, Rfl: 3     repaglinide (PRANDIN) 1 MG tablet, Take 1 tablet (1 mg) by mouth 3 times daily (before meals), Disp: 90 tablet, Rfl: 3     Skin Protectants, Misc. (INTERDRY 10\"X36\") SHEE, Externally apply 1 Box topically daily 1 sheet under breasts prn intertrigo, Disp: 1 each, Rfl: 3     Skin Protectants, Misc. (INTERDRY AG TEXTILE 10\"X144\") SHEE, Externally apply 1 Box topically daily Apply to areas of intertrigo prn, Disp: 1 each, Rfl: 3     traZODone (DESYREL) 50 MG tablet, TAKE 3 TABLETS(150 MG) BY MOUTH AT BEDTIME, Disp: 270 tablet, Rfl: 3     vitamin D3 (CHOLECALCIFEROL) 50762 units (250 mcg) capsule, Take 1 capsule (10,000 Units) by mouth daily, Disp: , Rfl:      Continuous Blood Gluc  (FREESTYLE MARTY 14 DAY READER) JEZ, 1 Units 4 times daily (before meals and nightly) (Patient not taking: Reported on 2020), Disp: 1 Device, Rfl: 0     Continuous Blood Gluc Sensor (FREESTYLE MARTY 14 DAY SENSOR) MISC, 1 Units 4 times daily (before meals and nightly) (Patient not taking: Reported on 2020), Disp: 6 each, Rfl: 3     metoprolol succinate ER (TOPROL-XL) 25 MG 24 hr tablet, Please take 25 mg in the morning and 50 mg at night. (Patient not taking: Reported on 2020), Disp: 120 tablet, Rfl: 11     miconazole (MICATIN/MICRO GUARD) 2 % external powder, Apply topically as needed for itching or other Redness in bilateral groin and katharine clef (Patient not taking: Reported on 2020), Disp: 43 g, Rfl: 0     senna-docusate (SENOKOT-S/PERICOLACE) 8.6-50 MG tablet, Take 1-2 tablets by mouth 2 times daily as needed for constipation, Disp: 30 tablet, Rfl: 0     traMADol (ULTRAM) 50 MG tablet, Take 1 tablet (50 mg) by mouth every 6 hours as needed for breakthrough pain or severe pain (Patient not taking: Reported on 2020), Disp: 30 tablet, Rfl: 0  No current facility-administered medications for " this visit.     Facility-Administered Medications Ordered in Other Visits:      sodium chloride (PF) 0.9% PF flush 10 mL, 10 mL, Intracatheter, Once, Starla Saez NP    Plan:    Functional Status:  Comment: walks independently with a cane  Plan: n/a    Mental Health/Cognition:  Comment: n/a   Plan: n/a     Health Care Goals:  Comment: did not make any goals at this visit    Time spent with patient: 90    The patient meets one or more of the following criteria:  * Has been identified by their primary care provider at risk of nursing home placement    Next CP visit scheduled: June 26th at 2pm    Issues for Provider to follow up on: [facility] Paramedic had a first time visit with Mariel today. All of her medications where discussed including her Freestyle Kaylah 14 day  and Sensor. Mariel had some confusion about not receiving the sensors. [facility] Paramedic, with Mariel's permission, called her Pharmacy and was able to talk with someone at the Pharmacy who explained that neither the  nor the sensor was picked up and that neither one is covered under Mariel's insurance. Mariel would really like to have this  and sensor as she states her finger tips are not very good from the multiple finger pokes she does each day to check her blood sugars. Mariel was advised to call her prescribing Dr and explain her situation of her insurance not covering the Freestyle Kaylah 14 day.    **Of concern.. Mariel mentioned she has a lump on her neck that is about the size of a marble located near her trachea, she indicates she can feel it when she swallows and explains it as a pressure. It does not inhibit her ability to breath and has not caused her to choke on her food. Advised Mariel that it is important that this needs to be seen by her Dr for evaluation.    Mariel also brought up that she has been noticing that when she gets up in the mornings and after she has been up for some time her oxygen levels are in the  "80\"s and she does feel short of breath. She states that as the day goes on her oxygen levels do increase to the 90's and her shortness of breath does seem to get better.     Mariel did have a few other concerns that she would like to talk with her Dr about but she is at high risk for COVID and refuses to go into the clinic. Community Paramedic did explain that she can call and make a video visit with her Dr as long as she has a smart phone. Her roommate does have a smart phone and she indicated she would call on Monday to make that appointment.     Provider follow up visit needed: yes - video visit - patient will call to make an appt.  "

## 2020-06-17 ENCOUNTER — VIRTUAL VISIT (OUTPATIENT)
Dept: CARDIOLOGY | Facility: CLINIC | Age: 75
End: 2020-06-17
Attending: PHYSICIAN ASSISTANT
Payer: MEDICARE

## 2020-06-17 ENCOUNTER — PATIENT OUTREACH (OUTPATIENT)
Dept: CARE COORDINATION | Facility: CLINIC | Age: 75
End: 2020-06-17

## 2020-06-17 VITALS — DIASTOLIC BLOOD PRESSURE: 49 MMHG | SYSTOLIC BLOOD PRESSURE: 110 MMHG

## 2020-06-17 DIAGNOSIS — R29.6 FALLS FREQUENTLY: ICD-10-CM

## 2020-06-17 DIAGNOSIS — L30.4 INTERTRIGO: Primary | ICD-10-CM

## 2020-06-17 DIAGNOSIS — I25.10 CORONARY ARTERY DISEASE INVOLVING NATIVE CORONARY ARTERY OF NATIVE HEART WITHOUT ANGINA PECTORIS: ICD-10-CM

## 2020-06-17 DIAGNOSIS — I50.32 CHRONIC DIASTOLIC HEART FAILURE (H): Chronic | ICD-10-CM

## 2020-06-17 DIAGNOSIS — R00.2 PALPITATIONS: Primary | ICD-10-CM

## 2020-06-17 PROCEDURE — 99214 OFFICE O/P EST MOD 30 MIN: CPT | Mod: 95 | Performed by: PHYSICIAN ASSISTANT

## 2020-06-17 RX ORDER — BUMETANIDE 1 MG/1
TABLET ORAL
Qty: 360 TABLET | Refills: 3 | Status: SHIPPED | OUTPATIENT
Start: 2020-06-17 | End: 2020-09-28

## 2020-06-17 RX ORDER — POTASSIUM CHLORIDE 750 MG/1
20 TABLET, EXTENDED RELEASE ORAL 2 TIMES DAILY
Qty: 120 TABLET | Refills: 4 | Status: SHIPPED | OUTPATIENT
Start: 2020-06-17 | End: 2020-10-10

## 2020-06-17 RX ORDER — LOSARTAN POTASSIUM 50 MG/1
25 TABLET ORAL 2 TIMES DAILY
Qty: 90 TABLET | Refills: 0 | Status: SHIPPED | OUTPATIENT
Start: 2020-06-17 | End: 2020-12-23

## 2020-06-17 ASSESSMENT — PAIN SCALES - GENERAL: PAINLEVEL: NO PAIN (0)

## 2020-06-17 NOTE — PATIENT INSTRUCTIONS
Take your medicines every day, as directed    Changes made today:  o Decrease bumex to 4 mg in the morning 2 mg at night  o Keep taking potassium how you are- 40 meq twice a day  o Decrease your losartan to 25 mg (1/2 tab) twice a day  o Call us if the blood pressures is <90/50       Monitor Your Weight and Symptoms    Contact us if you:      Gain 2 pounds in one day or 5 pounds in one week    Feel more short of breath    Notice more leg swelling    Feel lightheadeded   Change your lifestyle    Limit Salt or Sodium:    2000 mg  Limit Fluids:    2000 mL or approximately 64 ounces  Eat a Heart Healthy Diet    Low in saturated fats  Stay Active:    Aim to move at least 150 minutes every  week         To Contact us    During Business Hours:  [phone], option # 1 (University)  Then option # 4 (medical questions)     After hours, weekends or holidays:   [phone], Option #4  Ask to speak to the On-Call Cardiologist. Inform them you are a CORE/heart failure patient at the [facility].     Use [employer] allows you to communicate directly with your heart team through secure messaging.    Zoe Majeste can be accessed any time on your phone, computer, or tablet.    If you need assistance, we'd be happy to help!         Keep your Heart Appointments:    - BMP (labs) next week- Elena will call you to talk about where makes the most sense to get labs- [facility] if they are open  - We will keep following your cardiomems numbers  - CORE visit in 6 weeks  - Dr. Wolf in 3-6 months as planned
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
## 2020-06-17 NOTE — PROGRESS NOTES
"Mariel Ribeiro is a 75 year old female who is being evaluated via a billable video visit.      The patient has been notified of following:     \"This video visit will be conducted via a call between you and your physician/provider. We have found that certain health care needs can be provided without the need for an in-person physical exam.  This service lets us provide the care you need with a video conversation.  If a prescription is necessary we can send it directly to your pharmacy.  If lab work is needed we can place an order for that and you can then stop by our lab to have the test done at a later time.    Video visits are billed at different rates depending on your insurance coverage.  Please reach out to your insurance provider with any questions.    If during the course of the call the physician/provider feels a video visit is not appropriate, you will not be charged for this service.\"    Patient has given verbal consent for Video visit? Yes    Will anyone else be joining your video visit? No      "

## 2020-06-19 ENCOUNTER — PATIENT OUTREACH (OUTPATIENT)
Dept: CARE COORDINATION | Facility: CLINIC | Age: 75
End: 2020-06-19
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
## 2020-06-22 ENCOUNTER — ALLIED HEALTH/NURSE VISIT (OUTPATIENT)
Dept: CARDIOLOGY | Facility: CLINIC | Age: 75
End: 2020-06-22
Attending: PHYSICIAN ASSISTANT
Payer: MEDICARE

## 2020-06-22 DIAGNOSIS — I50.32 CHRONIC DIASTOLIC HEART FAILURE (H): Primary | Chronic | ICD-10-CM

## 2020-06-22 PROCEDURE — 93264 REM MNTR WRLS P-ART PRS SNR: CPT | Mod: ZP | Performed by: PHYSICIAN ASSISTANT

## 2020-06-22 NOTE — Clinical Note
Charges have been dropped for CardioMems billing. Please document and sign visit.   Flaco Foreman, [facility] Heart Failure Admin/Referrals

## 2020-06-22 NOTE — PROGRESS NOTES
"Clinic Care Coordination Contact    Follow Up Progress Note      Assessment: returning patient's call. She left me a voice mail that she had a very long hold time calling her clinic to schedule a phone visit with her PCP and her call was disconnected. Talked with patient. Apologized for her difficulty scheduling. Reviewed recent community paramedic visits. They met in her gazebo and she was comfortable with the interaction as she is very anxious regarding possible exposure to COVID-19. Reports chronic \"fiery red\" rash in her groin and perineal area. Has seen PCP regarding this last Fall and was given a spray which is no longer effective. Has tried anti-fungal creams with no relief.     Goals addressed this encounter:   Goals Addressed                 This Visit's Progress      Increase physical activity (pt-stated)   On Hold     1-Goal Statement: I want to return to riding my [employer] bike to help increase the strength in my legs within 3 months   Date Goal set: 4/6/2020  Barriers: recent injury to coccyx from fall is causing pain that interferes with her ability to walk comfortably  Strengths: feels motivated currently to improve her strength and mobility  Date to Achieve By: 7/1/2020  Patient expressed understanding of goal: yes  Action steps to achieve this goal:  1. I will do chair stretches to improve flexibility and reduce coccyx pain  2. I will walk from the back of the house to the front and back again using my cane if my roommate isn't working in the living room    5-4-2020: able to walk in the home but unable to proceed with returning to the bike due to coccyx pain or doing chair exercises until she gets a pillow that she ordered for coccyx support    5-: not reviewed             Medical (pt-stated)   0%     Goal Statement: I will attend appointments with nephrology and endocrinology specialists within the next 1-2 months  Date Goal set: 5-  Barriers: patient declines to attend in-person " appointments due to COVID-19 pandemic  Strengths: willing to attend visits from home by phone  Date to Achieve By: 7-  Patient expressed understanding of goal: yes  Action steps to achieve this goal:  1. I will call the phone numbers provided to me by my RN CC to schedule the appointments  2. I will call my clinic or RN CC if I have difficulty scheduling the appointments    6-: no appointments scheduled            Monitoring (pt-stated)   On track     2-Goal Statement: Goal Statement: I want to maintain my weight so that it meets the weight parameter set by my cardiologist and [facility] clinic team for the next six months  Date Goal set: 5-8-2020  Barriers: at times forgets to weigh herself or write it down  Strengths: states that her roommate can remind her  Date to Achieve By: 10/1/2020  Patient expressed understanding of goal: yes  Action steps to achieve this goal:  1. I will ask my roommate to remind me to weigh myself and record the weight daily  2. I will call my clinic or [facility] clinic with a daily weight gain over 2 lbs in one day or 5 lbs in one week  3. I will update my cardiology team with my recorded weights at my appointments    5-4-2020: hasn't been able to weigh herself for two weeks but just got new batteries for her scale and she is able to weigh herself again             Intervention/Education provided during outreach: home treatment measures for groin/perineal rash reviewed.    Reviewed with patient that I no longer work at her clinic and would be providing warm hand off to her new team, which I introduced individually to her. She is upset by this despite reassurance that her new team would be updated on her medical history and can reach me at any time with questions about her care.          Plan:   Patient to trial mixing anti-fungal with water barrier such as desitin twice daily and cool dry compresses for comfort and will call back on Tuesday for recheck. Reached out to clinic staff who  contacted patient and she is scheduled for follow up phone visit with PCP 6- 2:40 pm.   Care Coordinator will follow up 6-    Vibha Carvajal RN  [facility]-Care Coordination  [facility]  [facility]  [phone]

## 2020-06-23 ENCOUNTER — PATIENT OUTREACH (OUTPATIENT)
Dept: CARDIOLOGY | Facility: CLINIC | Age: 75
End: 2020-06-23

## 2020-06-23 ENCOUNTER — ALLIED HEALTH/NURSE VISIT (OUTPATIENT)
Dept: CARDIOLOGY | Facility: CLINIC | Age: 75
End: 2020-06-23
Attending: NURSE PRACTITIONER
Payer: MEDICARE

## 2020-06-23 ENCOUNTER — PATIENT OUTREACH (OUTPATIENT)
Dept: CARE COORDINATION | Facility: CLINIC | Age: 75
End: 2020-06-23

## 2020-06-23 DIAGNOSIS — I50.32 CHRONIC DIASTOLIC HEART FAILURE (H): Primary | ICD-10-CM

## 2020-06-23 DIAGNOSIS — L30.4 INTERTRIGO: Primary | ICD-10-CM

## 2020-06-23 NOTE — TELEPHONE ENCOUNTER
[facility] - CardioMEMS Reading Review    June 23, 2020   CardioMEMS reviewed and routed to patient's provider, Yuni SCHAEFER.   Current diuretic: Bumex 4mg/2mg  Recent plan of care changes: Decreased Bumex on 6/17    Current Threshold parameters:       Today's Waveform:       Readings:           RHC Date Wedge Pressure MEMS PAD during cath  (average of the 3 values)     7/1/2019 w/MEMS implant     20 mmHg   18 mmHg           Rosina Mays RN

## 2020-06-23 NOTE — PROGRESS NOTES
Clinic Care Coordination Contact  [facility]/Voicemail       Clinical Data: Care Coordinator Outreach  Outreach attempted x 1.  Left message on patient's voicemail with call back information and requested return call.  Plan: Care Coordinator will try to reach patient again in 1-3 business days.    Vibha Carvajal RN  [facility] Primary [facility]-Care Coordination  [facility]  [facility]  [phone]

## 2020-06-24 ENCOUNTER — PATIENT OUTREACH (OUTPATIENT)
Dept: NURSING | Facility: CLINIC | Age: 75
End: 2020-06-24
Payer: MEDICARE

## 2020-06-24 DIAGNOSIS — N18.30 TYPE 2 DIABETES MELLITUS WITH STAGE 3 CHRONIC KIDNEY DISEASE, WITH LONG-TERM CURRENT USE OF INSULIN (H): ICD-10-CM

## 2020-06-24 DIAGNOSIS — E11.22 TYPE 2 DIABETES MELLITUS WITH STAGE 3 CHRONIC KIDNEY DISEASE, WITH LONG-TERM CURRENT USE OF INSULIN (H): ICD-10-CM

## 2020-06-24 DIAGNOSIS — Z79.4 TYPE 2 DIABETES MELLITUS WITH STAGE 3 CHRONIC KIDNEY DISEASE, WITH LONG-TERM CURRENT USE OF INSULIN (H): ICD-10-CM

## 2020-06-24 DIAGNOSIS — I50.32 CHRONIC DIASTOLIC HEART FAILURE (H): Chronic | ICD-10-CM

## 2020-06-24 LAB — HBA1C MFR BLD: 6.9 % (ref 0–5.6)

## 2020-06-24 PROCEDURE — 80048 BASIC METABOLIC PNL TOTAL CA: CPT | Performed by: PHYSICIAN ASSISTANT

## 2020-06-24 PROCEDURE — 36415 COLL VENOUS BLD VENIPUNCTURE: CPT | Performed by: PHYSICIAN ASSISTANT

## 2020-06-24 PROCEDURE — 83036 HEMOGLOBIN GLYCOSYLATED A1C: CPT | Performed by: PHYSICIAN ASSISTANT

## 2020-06-25 LAB
ANION GAP SERPL CALCULATED.3IONS-SCNC: 9 MMOL/L (ref 3–14)
BUN SERPL-MCNC: 41 MG/DL (ref 7–30)
CALCIUM SERPL-MCNC: 9.4 MG/DL (ref 8.5–10.1)
CHLORIDE SERPL-SCNC: 104 MMOL/L (ref 94–109)
CO2 SERPL-SCNC: 25 MMOL/L (ref 20–32)
CREAT SERPL-MCNC: 1.75 MG/DL (ref 0.52–1.04)
GFR SERPL CREATININE-BSD FRML MDRD: 28 ML/MIN/{1.73_M2}
GLUCOSE SERPL-MCNC: 159 MG/DL (ref 70–99)
POTASSIUM SERPL-SCNC: 4.4 MMOL/L (ref 3.4–5.3)
SODIUM SERPL-SCNC: 138 MMOL/L (ref 133–144)

## 2020-06-25 NOTE — PROGRESS NOTES
"Clinic Care Coordination Contact    Follow Up Progress Note      Assessment: see 6- RN CC note. Patient was not able to have anti-fungal and desitin delivered to her home yet. She has a lab appointment in clinic today and will stop by the pharmacy. Skin remains \"firery red\", irritated and itchy. Asked her to  hydrocortisone 1% cream to try to settle down irritation, then cover with desitin for a few days. She has an appointment with the community paramedic tomorrow for home visit. Will ask CP to assess skin and make recommendations as needed. Patient reports that she was falling \"a lot\" yesterday at home. States it is hard to lift her right leg and when she walks her right knee \"drew\". Went to the chiropractor where she has regular visits. chiro did adjustment to her back and provided acupuncture around her knee and recommended that she see a neurologist for evaluation. Patient reporting low blood pressures of 80's systolic and she gets lightheaded with laying down then sitting up. Blood pressure reported to be \"very low\" in chiropractic office but provider did give her the readings. Blood pressure eventually rises to 126 systolic at home. Roommate reminded patient she did not drink much fluids yesterday. Reviewed this could be attributing to low blood pressure readings. She has fluid limitation of 4 16 oz bottles of fluids daily r/t heart failure. Tries to drink 2 bottles of lemonade vitamin water and one bottle plain water. She will try to increase fluids and watch positional changes. Advised to call cardiology clinic for recommendations as they have been decreasing her bumex and losartan doses due to patient's report of lightheadedness.    Goals addressed this encounter:   Goals Addressed                 This Visit's Progress      Increase physical activity (pt-stated)   10%     1-Goal Statement: I want to return to riding my [employer] bike to help increase the strength in my legs within 3 " months   Date Goal set: 4/6/2020  Barriers: recent injury to coccyx from fall is causing pain that interferes with her ability to walk comfortably  Strengths: feels motivated currently to improve her strength and mobility  Date to Achieve By: 7/1/2020  Patient expressed understanding of goal: yes  Action steps to achieve this goal:  1. I will do chair stretches to improve flexibility and reduce coccyx pain  2. I will walk from the back of the house to the front and back again using my cane if my roommate isn't working in the living room    5-4-2020: able to walk in the home but unable to proceed with returning to the bike due to coccyx pain or doing chair exercises until she gets a pillow that she ordered for coccyx support    5-: not reviewed             Medical (pt-stated)   10%     Goal Statement: I will attend appointments with nephrology and endocrinology specialists within the next 1-2 months  Date Goal set: 5-  Barriers: patient declines to attend in-person appointments due to COVID-19 pandemic  Strengths: willing to attend visits from home by phone  Date to Achieve By: 7-  Patient expressed understanding of goal: yes  Action steps to achieve this goal:  1. I will call the phone numbers provided to me by my RN CC to schedule the appointments  2. I will call my clinic or RN CC if I have difficulty scheduling the appointments    6-: no appointments scheduled            Monitoring (pt-stated)   On track     2-Goal Statement: Goal Statement: I want to maintain my weight so that it meets the weight parameter set by my cardiologist and [facility] clinic team for the next six months  Date Goal set: 5-8-2020  Barriers: at times forgets to weigh herself or write it down  Strengths: states that her roommate can remind her  Date to Achieve By: 10/1/2020  Patient expressed understanding of goal: yes  Action steps to achieve this goal:  1. I will ask my roommate to remind me to weigh myself and  record the weight daily  2. I will call my clinic or [facility] clinic with a daily weight gain over 2 lbs in one day or 5 lbs in one week  3. I will update my cardiology team with my recorded weights at my appointments    5-4-2020: hasn't been able to weigh herself for two weeks but just got new batteries for her scale and she is able to weigh herself again             Intervention/Education provided during outreach: reviewed home treatment measures for skin rash. Reviewed need to take in adequate fluids daily to help support blood pressure          Plan:   Will attempt to outreach to CP prior to visit to review plan. Patient has follow up visit scheduled with PCP next Monday. Will call patient Friday to check in regarding symptoms and can adjust plan from there as needed.     Vibha Carvajal RN  [facility]-Care Coordination  [facility]  [facility]  [phone]

## 2020-06-25 NOTE — PROGRESS NOTES
[facility] Clinic - CardioMEMS Reading Review    June 25, 2020     Current diuretic:Bumex 4mg /2mg  Recent plan of care changes: decreased Bumex on 6/17    Current Threshold parameters:       Today's Waveform:       Readings:         RHC Date Wedge Pressure MEMS PAD during cath  (average of the 3 values)     7/1/2019 w/MEMS implant     20 mmHg   18 mmHg           Rosina Mays RN

## 2020-06-26 ENCOUNTER — APPOINTMENT (OUTPATIENT)
Dept: NURSING | Facility: CLINIC | Age: 75
End: 2020-06-26
Payer: MEDICARE

## 2020-06-29 ENCOUNTER — ALLIED HEALTH/NURSE VISIT (OUTPATIENT)
Dept: BEHAVIORAL HEALTH | Facility: CLINIC | Age: 75
End: 2020-06-29
Payer: COMMERCIAL

## 2020-06-29 ENCOUNTER — VIRTUAL VISIT (OUTPATIENT)
Dept: FAMILY MEDICINE | Facility: CLINIC | Age: 75
End: 2020-06-29
Payer: MEDICARE

## 2020-06-29 DIAGNOSIS — B37.31 CANDIDIASIS OF VULVA AND VAGINA: Primary | ICD-10-CM

## 2020-06-29 DIAGNOSIS — J44.9 COPD (CHRONIC OBSTRUCTIVE PULMONARY DISEASE) (H): Primary | ICD-10-CM

## 2020-06-29 PROCEDURE — 99207 C COMMUNITY PARAMEDIC - PATIENT NOT BILLABLE: CPT

## 2020-06-29 PROCEDURE — 99213 OFFICE O/P EST LOW 20 MIN: CPT | Mod: 95 | Performed by: FAMILY MEDICINE

## 2020-06-29 ASSESSMENT — ACTIVITIES OF DAILY LIVING (ADL): DEPENDENT_IADLS:: SHOPPING

## 2020-06-29 NOTE — PROGRESS NOTES
"Mariel Ribeiro is a 75 year old female who is being evaluated via a billable video visit.      The patient has been notified of following:     \"This video visit will be conducted via a call between you and your physician/provider. We have found that certain health care needs can be provided without the need for an in-person physical exam.  This service lets us provide the care you need with a video conversation.  If a prescription is necessary we can send it directly to your pharmacy.  If lab work is needed we can place an order for that and you can then stop by our lab to have the test done at a later time.    Video visits are billed at different rates depending on your insurance coverage.  Please reach out to your insurance provider with any questions.    If during the course of the call the physician/provider feels a video visit is not appropriate, you will not be charged for this service.\"    Patient has given verbal consent for Video visit? Yes  How would you like to obtain your AVS? [facility]  Patient would like the video invitation sent by:[email or web address]  Will anyone else be joining your video visit? Yes- community paramedic       Subjective     Mariel Ribeiro is a 75 year old female who presents today via video visit for the following health issues:    HPI    Presents with her community paramedic partner to review best treatment options for a very flared presumed Candidal vaginitis in vaginal and vulvar areas only.  She was advised to use Monistat, HC 1% and Desitin but did not mix these creams together to create a compoiunded cream but rather layered one on the other.  She states this has not helped.  Denies any skin breakdown or weeping areas.    Video Start Time: 240p        Patient Active Problem List   Diagnosis     Hypothyroidism     Diverticulitis of colon     Coronary atherosclerosis     Myalgia and myositis     Migraine     Chronic airway obstruction/ COPD     Mononeuritis     Obstructive " sleep apnea     Vitamin D deficiency     Hypertension goal BP (blood pressure) < 130/80     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)     Hyperlipidemia with target LDL less than 70     Chronic diastolic heart failure (H)     Osteoarthritis     Morbid obesity (H)     Arthritis of knee     Transient cerebral ischemia     History of thyroid cancer     Cervicalgia     Fatty liver disease, nonalcoholic     Hepatomegaly     Angina at rest (H)     S/P CABG x 3     Type 2 diabetes mellitus with stage 3 chronic kidney disease, with long-term current use of insulin (H)     Lymphedema     Lower back pain     Bilateral carotid artery disease (H)     Spinal stenosis of lumbar region, unspecified whether neurogenic claudication present     Status post coronary angiogram     Hemoptysis     Intertrigo     Malignant neoplasm of lower-inner quadrant of left breast in female, estrogen receptor positive (H)     Past Surgical History:   Procedure Laterality Date     ANGIOGRAPHY  8/11    with stent placement X2 mid- and proximal LAD     ARTHROPLASTY KNEE  1/28/2014    Procedure: ARTHROPLASTY KNEE;  ARTHROPLASTY KNEE -RIGHT TOTAL  ;  Surgeon: Victor Manuel Wolff MD;  Location: [facility] OR     BYPASS GRAFT ARTERY CORONARY N/A 7/2/2018    Procedure: BYPASS GRAFT ARTERY CORONARY;  Median Sternotomy, On Cardiopulmonary Bypass Pump, Coronary Artery Bypass Graft x 3, using Left Internal Mammary and Endoscopic Vein [place] on Bilateral Saphenous Vein, Transesophageal Echocardiogram, Epi-aortic Ultrasound;  Surgeon: Joao Mason MD;  Location:  OR      TOTAL KNEE ARTHROPLASTY  7/30/04    Knee Replacement, Total right and left     CATARACT IOL, RT/LT Bilateral      COLONOSCOPY       CV CARDIOMEMS WITH RIGHT HEART CATH N/A 7/1/2019    Procedure: CV CARDIOMEMS;  Surgeon: Michele Arce MD;  Location:  HEART CARDIAC CATH LAB     CV PULMONARY ANGIOGRAM N/A 7/1/2019    Procedure: Pulmonary Angiogram;  Surgeon: Michele Arce MD;  Location:   HEART CARDIAC CATH LAB     CV RIGHT HEART CATH N/A 2019    Procedure: CV RIGHT HEART CATH;  Surgeon: Michele Arce MD;  Location:  HEART CARDIAC CATH LAB     DILATION AND CURETTAGE, OPERATIVE HYSTEROSCOPY WITH MORCELLATOR, COMBINED N/A 2016    Procedure: COMBINED DILATION AND CURETTAGE, OPERATIVE HYSTEROSCOPY WITH MORCELLATOR;  Surgeon: Stacey Arnold DO;  Location: [facility]     HC INTRODUCE NEEDLE/CATH, EXTREMITY ARTERY  ,,    Angiocardiogram     HC KNEE SCOPE, DIAGNOSTIC      Arthroscopy, Knee, bilateral     LAPAROSCOPIC CHOLECYSTECTOMY N/A 2017    Procedure: LAPAROSCOPIC CHOLECYSTECTOMY;  Laparoscopic Cholecystectomy   *Latex Allergy*, Anesthesia Block;  Surgeon: Jeffrey Roberson MD;  Location: [facility]     PHACOEMULSIFICATION CLEAR CORNEA WITH STANDARD INTRAOCULAR LENS IMPLANT Right 2014    Procedure: PHACOEMULSIFICATION CLEAR CORNEA WITH STANDARD INTRAOCULAR LENS IMPLANT;  Surgeon: Smith Quintana MD;  Location: [facility]     PHACOEMULSIFICATION CLEAR CORNEA WITH TORIC INTRAOCULAR LENS IMPLANT Left 2015    Procedure: PHACOEMULSIFICATION CLEAR CORNEA WITH TORIC INTRAOCULAR LENS IMPLANT;  Surgeon: Smith Quintana MD;  Location: [facility]     SURGICAL HISTORY OF -       dentures     SURGICAL HISTORY OF -       thyroidectomy     SURGICAL HISTORY OF -       right thumb surgery     SURGICAL HISTORY OF -       right breast biopsy (benign)     SURGICAL HISTORY OF -   2004    left shoulder surgery - rotator cuff     SURGICAL HISTORY OF -       left thumb surgery     THYROIDECTOMY         Social History     Tobacco Use     Smoking status: Former Smoker     Packs/day: 0.00     Years: 27.00     Pack years: 0.00     Types: Cigarettes     Last attempt to quit: 1989     Years since quittin.0     Smokeless tobacco: Never Used   Substance Use Topics     Alcohol use: No     Alcohol/week: 0.0 standard drinks     Family History   Problem  Relation Age of Onset     C.A.D. Mother      Diabetes Mother      Hypertension Mother      Blood Disease Mother         multiple episodes of thrombosis     Circulatory Mother         DVT X 2; ocular clot; cerebral; carotid artery stenosis     Glaucoma Mother      Macular Degeneration Mother      C.A.D. Father      Hypertension Father      Cerebrovascular Disease Father      Alcohol/Drug Father         etoh     Cancer Brother         liver,pancreas, brain     Cardiovascular Sister      Hypertension Sister      Hypertension Brother      Alcohol/Drug Sister         etoh     Alcohol/Drug Brother         drug     Diabetes Sister         younger     C.A.D. Sister         CABG age 65     C.A.D. Brother         CABG age 42     C.A.D. Sister         stents age 58     C.A.D. Brother      Genitourinary Problems Sister         kidney disease         Current Outpatient Medications   Medication Sig Dispense Refill     acetaminophen (TYLENOL) 325 MG tablet Take 650 mg by mouth every 4 hours as needed for mild pain Take 2 tablets by mouth every 4 hours as needed for mild pain 100 tablet      albuterol (PROAIR HFA) 108 (90 Base) MCG/ACT inhaler Inhale 1-2 puffs into the lungs every 4 hours as needed for wheezing 8.5 g      anastrozole (ARIMIDEX) 1 MG tablet Take 1 tablet (1 mg) by mouth daily 30 tablet 11     aspirin (ASA) 81 MG EC tablet Take 1 tablet (81 mg) by mouth daily       atorvastatin (LIPITOR) 40 MG tablet TAKE 1 TABLET(40 MG) BY MOUTH DAILY 90 tablet 3     bumetanide (BUMEX) 1 MG tablet As directed marcy [facility], continue current dose of 4 mg in the morning, 2 mg in the afternoon 360 tablet 3     buPROPion (WELLBUTRIN XL) 150 MG 24 hr tablet Take 1 tablet (150 mg) by mouth every morning 30 tablet 3     capsaicin (ZOSTRIX) 0.025 % external cream Apply 1 g topically 3 times daily For neuropathic pain. 60 g 1     Continuous Blood Gluc  (FREESTYLE MARTY 14 DAY READER) JEZ 1 Units 4 times daily (before meals and nightly) 1  Device 0     Continuous Blood Gluc Sensor (FREESTYLE MARTY 14 DAY SENSOR) MISC 1 Units 4 times daily (before meals and nightly) 6 each 3     EPINEPHrine (EPIPEN/ADRENACLICK/OR ANY BX GENERIC EQUIV) 0.3 MG/0.3ML injection 2-pack Inject 0.3 mLs (0.3 mg) into the muscle once as needed for anaphylaxis 0.6 mL 0     escitalopram (LEXAPRO) 20 MG tablet TAKE 1 TABLET(20 MG) BY MOUTH EVERY MORNING 90 tablet 3     ferrous gluconate (FERGON) 324 (38 Fe) MG tablet Take 1 tablet (324 mg) by mouth Every Mon, Wed, Fri Morning 36 tablet 3     folic acid (FOLVITE) 1 MG tablet Take 2 tablets (2 mg) by mouth daily       guaiFENesin (MUCINEX) 600 MG 12 hr tablet Take 1 tablet (600 mg) by mouth 2 times daily       insulin glargine (LANTUS SOLOSTAR) 100 UNIT/ML pen Inject  33 units  daily subcutaneously.  call  if blood sugar <70, often >200. 15 mL 0     levothyroxine (SYNTHROID) 150 MCG tablet Take 1 tablet (150 mcg) by mouth daily 90 tablet 3     iLumi SolutionsCAN FINEPOINT LANCETS MISC Use to test blood sugars 2 times daily or as directed. 100 each prn     lifitegrast (XIIDRA) 5 % opthalmic solution Place 1 drop into both eyes 2 times daily For additional refills, please call [phone] and make an appointment to be seen by a provider 60 each 3     losartan (COZAAR) 50 MG tablet Take 0.5 tablets (25 mg) by mouth 2 times daily 90 tablet 0     metoprolol succinate ER (TOPROL-XL) 25 MG 24 hr tablet Please take 25 mg in the morning and 50 mg at night. 120 tablet 11     miconazole (MICATIN/MICRO GUARD) 2 % external powder Apply topically as needed for itching or other Redness in bilateral groin and katharine clef 43 g 0     niacin ER (NIASPAN) 500 MG CR tablet TAKE 1 TABLET(500 MG) BY MOUTH TWICE DAILY 180 tablet 3     nitroGLYcerin (NITROSTAT) 0.4 MG sublingual tablet For chest pain place 1 tablet under the tongue every 5 minutes for 3 doses. If symptoms persist 5 minutes after 1st dose call 911. 25 tablet 0     ONE TOUCH ULTRA (DEVICES) MISC test  "blood sugar BID 1 0     ONETOUCH ULTRA test strip USE FOUR TIMES DAILY 400 strip 1     potassium chloride ER (KLOR-CON M) 10 MEQ CR tablet Take 2 tablets (20 mEq) by mouth 2 times daily 120 tablet 4     pregabalin (LYRICA) 100 MG capsule Take 1 capsule (100 mg) by mouth 2 times daily 180 capsule 3     repaglinide (PRANDIN) 1 MG tablet Take 1 tablet (1 mg) by mouth 3 times daily (before meals) 90 tablet 3     senna-docusate (SENOKOT-S/PERICOLACE) 8.6-50 MG tablet Take 1-2 tablets by mouth 2 times daily as needed for constipation 30 tablet 0     Skin Protectants, Misc. (INTERDRY 10\"X36\") SHEE Externally apply 1 Box topically daily 1 sheet under breasts prn intertrigo 1 each 3     Skin Protectants, Misc. (INTERDRY AG TEXTILE 10\"X144\") SHEE Externally apply 1 Box topically daily Apply to areas of intertrigo prn 1 each 3     traMADol (ULTRAM) 50 MG tablet Take 1 tablet (50 mg) by mouth every 6 hours as needed for breakthrough pain or severe pain 30 tablet 0     traZODone (DESYREL) 50 MG tablet TAKE 3 TABLETS(150 MG) BY MOUTH AT BEDTIME 270 tablet 3     vitamin D3 (CHOLECALCIFEROL) 69203 units (250 mcg) capsule Take 1 capsule (10,000 Units) by mouth daily       Allergies   Allergen Reactions     Albuterol Other (See Comments)     Sandrita-oral erythema  Confirmed 7/3/18 that patient uses albuterol inhalers at home. Patient had her home inhaler in her bag.     Contrast Dye Anaphylaxis     Fish Allergy Anaphylaxis     Iodine Anaphylaxis     Oxycodone Other (See Comments)     Severe suicidal tendencies on this medication     Tree Nuts [Nuts] Anaphylaxis     Tree nuts only (peanuts ok)     Bactrim      Increased uric acid     Betadine [Povidone Iodine] Hives, Swelling and Difficulty breathing     Betadine     Combivent      Rash     Dulaglutide Other (See Comments)     Hx . Of thyroid cancer.     Fish      Lisinopril Other (See Comments)     Scr/grf severely reduced.      Penicillins Rash     Allopurinol Rash     Latex Rash     " Wool Fiber Rash     Recent Labs   Lab Test 06/24/20  1420 05/12/20  1420 03/25/20  1353  10/01/19  1133  04/30/19  0658  04/09/19  2050  03/13/19  1508  09/10/18  0614  08/28/18  1427  03/19/18  1404  03/08/17  1420   A1C 6.9*  --   --   --   --   --  6.5*  --   --   --   --   --  5.2  --   --    < > 8.2*   < > 7.2*   LDL  --   --   --   --   --   --   --   --   --   --  76  --   --   --   --   --  111*  --  89   HDL  --   --   --   --   --   --   --   --   --   --  71  --   --   --   --   --  50  --  50   TRIG  --   --   --   --   --   --   --   --   --   --  118  --   --   --   --   --  212*  --  237*   ALT  --  58* 17  --   --   --   --   --  22  --   --    < >  --   --   --    < > 24   < >  --    CR 1.75* 1.53* 1.68*   < > 1.59*   < >  --    < > 1.43*   < > 1.51*   < > 1.48*   < > 1.41*   < > 1.28*   < > 1.19*   GFRESTIMATED 28* 33* 30*   < > 32*   < >  --    < > 36*   < > 34*   < > 35*   < > 37*   < > 41*   < > 45*   GFRESTBLACK 33* 38* 34*   < > 37*   < >  --    < > 42*   < > 39*   < > 42*   < > 44*   < > 50*   < > 54*   POTASSIUM 4.4 3.8 3.9   < > 4.0   < >  --    < > 4.1   < > 4.6   < > 3.9   < > 4.0   < > 3.6   < > 3.8   TSH  --   --   --   --  5.02*  --   --   --   --   --   --   --   --   --  2.97  --  1.15   < > 0.12*    < > = values in this interval not displayed.      BP Readings from Last 3 Encounters:   06/17/20 110/49   06/12/20 114/62   05/12/20 (!) 153/74    Wt Readings from Last 3 Encounters:   06/12/20 146.7 kg (323 lb 8 oz)   05/12/20 146.5 kg (323 lb)   02/18/20 147.6 kg (325 lb 8 oz)                    Reviewed and updated as needed this visit by Provider         Review of Systems   Constitutional, HEENT, cardiovascular, pulmonary, GI, , musculoskeletal, neuro, skin, endocrine and psych systems are negative, except as otherwise noted.      Objective             Physical Exam     GENERAL: Healthy, alert and no distress  EYES: Eyes grossly normal to inspection.  No discharge or erythema, or  obvious scleral/conjunctival abnormalities.  RESP: No audible wheeze, cough, or visible cyanosis.  No visible retractions or increased work of breathing.    SKIN: Visible skin clear. No significant rash, abnormal pigmentation or lesions.  NEURO: Cranial nerves grossly intact.  Mentation and speech appropriate for age.  PSYCH: Mentation appears normal, affect normal/bright, judgement and insight intact, normal speech and appearance well-groomed.              Assessment & Plan     1. Candidiasis of vulva and vagina    Recommend mixing equal parts of Monistat, 1% HC cream and Zinc oxide and apply 5-6 times a day as needed with each toileting episode to treat.  Advised to wear loose-fitting underwear and breathable shorts to help to keep area clean and dry.  Is using pad for urinary leakage- may need to not use these pads until skin heals.  Follow-up if no change or worsening symptoms prn.       Amee Connell MD  [facility]      Video-Visit Details    Type of service:  Video Visit    Video End Time:2:47 PM    Originating Location (pt. Location): Home    Distant Location (provider location):  [facility]     Platform used for Video Visit: Guy    No follow-ups on file.       Amee Connell MD

## 2020-06-30 NOTE — PROGRESS NOTES
[facility] - CardioMEMS Reading Review    June 30, 2020   CardioMEMS reviewed.  Current diuretic: Bumex 4mg/2mg  Recent plan of care changes: none    Current Threshold parameters:       Today's Waveform:       Readings:           RHC Date Wedge Pressure MEMS PAD during cath  (average of the 3 values)     7/11/2019 w/MEMS implant     20 mmHg   18 mmHg           Rosina Mays RN

## 2020-07-02 ENCOUNTER — PATIENT OUTREACH (OUTPATIENT)
Dept: CARE COORDINATION | Facility: CLINIC | Age: 75
End: 2020-07-02

## 2020-07-02 DIAGNOSIS — N63.0 BREAST MASS: Primary | ICD-10-CM

## 2020-07-02 DIAGNOSIS — M17.10 ARTHRITIS OF KNEE: ICD-10-CM

## 2020-07-02 NOTE — PROGRESS NOTES
"Clinic Care Coordination Contact    Follow Up Progress Note      Assessment: patient had virtual visit with PCP attended also during home visit with community paramedic, Holli, to discuss worsening perineal/groin chronic rash. Initially she wanted JOBY Russo, only to see her but was reassured with discussion with Holli regarding her 20 years experience as a CP and would like to work with her ongoing. PCP made adjustments to creams (mixing instead of layering) and patient is following instructions and thinks she may be feeling mild improvement. Patient reports continued right knee instability. Feels \"wobbly\" and at times her knee gives out, especially after doing home physical therapy exercises which she does attempt daily. Sees chiropractor regularly who suspect there is an issue with her outer right thigh causing knee problem. She recommends patient follow with nephrologist. Advised patient that would be incorrect specialty to help with this problem but could consider orthopedic or sports medicine referral. At this time she is declining all in-person clinic visits due to fears of jt COVID-19. Patient does wear ACE wraps regularly from bilateral foot to below knee due to history of lymphedema. Suggested she consider trying wraps over knee to thigh to see if this helps with instability and comfort. She agrees to in-person visits with her chiropractor who allows only one patient visit at a time and she feels thoroughly disinfects equipment prior to her arrival between another patient. Reviewed recent lab work as ordered by endocrinologist and cardiologist. Labs are deemed WNL or stable with no particular follow up recommended at this time. Discussed oncology visit. Patient does agree per recommendation of provider to attend an in-person visit if she notices any increase in size of either of 2 breast masses.     Goals addressed this encounter:   Goals Addressed                 This Visit's Progress      " Functional (pt-stated)   On track     Goal Statement: I will continue to monitor my right knee instability for the next 1-3 months  Date Goal set: 7-2-2020  Barriers: declines in-person appointments/evaluation with specialist due to fear of jt COVID-19   Strengths: willing to consider with considerable worsening of knee instability  Date to Achieve By: 10-2-2020  Patient expressed understanding of goal: yes  Action steps to achieve this goal:  1. I will continue to see my chiropractor for routine visits for acupuncture treatments which I feel may be helping my knee instability   2. I will consider wearing ACE wraps from my knee to thigh to see if this improves instability  3. I will call my PCP for plan if I am experiencing worsening to discuss plan          COMPLETED: Increase physical activity (pt-stated)   10%     1-Goal Statement: I want to return to riding my [employer] bike to help increase the strength in my legs within 3 months   Date Goal set: 4/6/2020  Barriers: recent injury to coccyx from fall is causing pain that interferes with her ability to walk comfortably  Strengths: feels motivated currently to improve her strength and mobility  Date to Achieve By: 7/1/2020  Patient expressed understanding of goal: yes  Action steps to achieve this goal:  1. I will do chair stretches to improve flexibility and reduce coccyx pain  2. I will walk from the back of the house to the front and back again using my cane if my roommate isn't working in the living room    5-4-2020: able to walk in the home but unable to proceed with returning to the bike due to coccyx pain or doing chair exercises until she gets a pillow that she ordered for coccyx support    5-: not reviewed     7-2-2020: patient unable to progress to goal r/t continued slow recovery of coccyx injury            COMPLETED: Medical (pt-stated)        Goal Statement: I will attend appointments with nephrology and endocrinology specialists  within the next 1-2 months  Date Goal set: 5-  Barriers: patient declines to attend in-person appointments due to COVID-19 pandemic  Strengths: willing to attend visits from home by phone  Date to Achieve By: 7-  Patient expressed understanding of goal: yes  Action steps to achieve this goal:  1. I will call the phone numbers provided to me by my RN CC to schedule the appointments  2. I will call my clinic or RN CC if I have difficulty scheduling the appointments    6-: no appointments scheduled  7-2-2020: lab results reviewed with patient. No follow up particularly recommended per cardiology and endocrinology due to stable lab results            Medical (pt-stated)   On track     Goal Statement: I agree to see my oncologist for an in-person visit as discussed if I have any increase in size of my breast masses within the next 3-6 months  Date Goal set: 7-2-2020  Barriers: declines going to [facility] for visit due to fear of jt COVID-19 virus  Strengths: trusts her oncologist and will consider in-person visit if needed  Date to Achieve By: 1-2-2021  Patient expressed understanding of goal: yes  Action steps to achieve this goal:  1. I will check my breast masses for enlargement at interval recommended by my oncologist  2. I will contact my oncologist for an in-person appointment if I discover an enlargement in one or both of my breast masses  3. I will contact my oncologist if I still have concerns/fear of in-person visit if COVID-19 remains a pandemic concern to discuss a plan          Monitoring (pt-stated)   On track     2-Goal Statement: Goal Statement: I want to maintain my weight so that it meets the weight parameter set by my cardiologist and CORE clinic team for the next six months  Date Goal set: 5-8-2020  Barriers: at times forgets to weigh herself or write it down  Strengths: states that her roommate can remind her  Date to Achieve By: 10/1/2020  Patient expressed  understanding of goal: yes  Action steps to achieve this goal:  1. I will ask my roommate to remind me to weigh myself and record the weight daily  2. I will call my clinic or [facility] clinic with a daily weight gain over 2 lbs in one day or 5 lbs in one week  3. I will update my cardiology team with my recorded weights at my appointments    5-4-2020: hasn't been able to weigh herself for two weeks but just got new batteries for her scale and she is able to weigh herself again             Intervention/Education provided during outreach: consider trying ACE wraps to knee and thigh to see if improvement in comfort and knee/thigh instability.      Outreach Frequency: monthly    Plan:   Patient to call PCP clinic or specialty with worsening of symptoms    RN CC to follow up with patient in two weeks. Will move to one month if appointment is scheduled for home visit with community paramedic team (none currently scheduled per chart review). Patient agrees with plan    Vibha Carvajal RN  [facility] Primary [facility]-Care Coordination  [facility]  [facility]  [phone]

## 2020-07-06 VITALS — OXYGEN SATURATION: 97 % | HEART RATE: 66 BPM | DIASTOLIC BLOOD PRESSURE: 54 MMHG | SYSTOLIC BLOOD PRESSURE: 100 MMHG

## 2020-07-06 NOTE — PROGRESS NOTES
Community Paramedics Follow-up Visit  June 29, 2020  TIME: 1430    Mariel Ribeiro is a 75 year old female being seen at home for a follow-up visit.    Present at appointment: Patient    Primary Diagnosis: Frailty/Failure to thrive    Chief Complaint   Patient presents with     Outreach       [place] Utilization:  Clinic Utilization  Difficulty keeping appointments:: No  Compliance Concerns: No  No-Show Concerns: No  No PCP office visit in Past Year: No  Utilization    Last refreshed: 7/6/2020  1:04 PM:  Hospital Admissions 1           Last refreshed: 7/6/2020  1:04 PM:  ED Visits 3           Last refreshed: 7/6/2020  1:04 PM:  No Show Count (past year) 3              Current as of: 7/6/2020  1:04 PM              /54   Pulse 66   SpO2 97%     Clinical Concerns:  Current Medical Concerns:  Rash in groin area, diabetes    Current Behavioral Concerns: none     Education Provided to patient: none   Medication set up? Yes, assisted Mariel with combining all 3 creams for her rash  Pill Box issued: No  Scale issued: No  Health Maintenance Reviewed: Due/Overdue yes    Recent blood sugars: 6/29 7:27a 226               6/28 7:31a 183                             8:44p   230               6/27 8:03a   182      10:20p 283               6/26 8:43a 150      4:25p 184     6/25 8:22a 210      5:02p 208    Review of Symptoms/PE    Skin: negative, rash  Eyes: negative  Ears/Nose/Throat: negative  Respiratory: Shortness of breath- on excertion  Cardiovascular: negative  Gastrointestinal: negative  Genitourinary: negative  Musculoskeletal: negative  Neurologic: negative  Psychiatric: negative    Pain Management::  Pain (GOAL):: Yes  Type: Acute (<3mo)  Location of chronic pain:: coccyx-? fracture due to fall per chiropractor  Radiating: No  Progression: Waxing and Waning  Description of pain: Aching, Nagging  Chronic pain severity:: 5  Limitation of routine activities due to chronic pain:: Yes(limits walking due to  pain)  Description: Able to do most things most days with some rest  Alleviating Factors: Rest, Ice, Heat, Other(got thick mattress pad for her bed; sits on pillows)  Aggravating Factors: Activity    Medication Reconciliation  Medication adherence problem (GOAL):: No  Knowledgeable about how to use meds:: Yes  Medication side effects suspected:: No    Diet/Exercise/Sleep  Diet:: Diabetic diet  Inadequate nutrition (GOAL):: No  Tube Feeding: No  Inadequate activity/exercise (GOAL):: Yes  Significant changes in sleep pattern (GOAL): No    Plan:     Time spent with patient: 75    The patient meets one or more of the following criteria:  * Has been identified by their primary care provider at risk of nursing home placement    Acute concern/Follow-up recommendations: TBD     Next CP visit scheduled: TBD      Issues for Provider to follow up on: Community Paramedic present with Mariel during video visit with  After visit assisted Mariel with putting together the 3 creams recommended by the Dr. Assisted Mariel in finding cloths that will allowed air to her groin area to help with the healing of the rash.     Provider follow up visit needed: 7/23/20

## 2020-07-07 NOTE — PROGRESS NOTES
[facility] - CardioMEMS Reading Review    July 7, 2020   CardioMEMS reviewed.  Current diuretic: Bumex 4mg/2mg  Recent plan of care changes:   None    Current Threshold parameters:       Today's Waveform:       Readings:           RHC Date Wedge Pressure MEMS PAD during cath  (average of the 3 values)     7/11/2019 w/MEMS implant     20 mmHg   18 mmHg           Rosina Mays RN

## 2020-07-08 ENCOUNTER — PATIENT OUTREACH (OUTPATIENT)
Dept: CARDIOLOGY | Facility: CLINIC | Age: 75
End: 2020-07-08

## 2020-07-08 ENCOUNTER — PATIENT OUTREACH (OUTPATIENT)
Dept: BEHAVIORAL HEALTH | Facility: CLINIC | Age: 75
End: 2020-07-08

## 2020-07-08 ASSESSMENT — ACTIVITIES OF DAILY LIVING (ADL): DEPENDENT_IADLS:: SHOPPING

## 2020-07-08 NOTE — TELEPHONE ENCOUNTER
Called Mariel to let her know her cardiomems have been within her range for last couple weeks. No changes at this time. She is feeling well.  Will continue to update her.   Rosina Mays RN

## 2020-07-09 NOTE — PROGRESS NOTES
Called Mariel to follow up on how she was doing with her rash. Mariel states it has gotten slightly better, but not as much as she thought it would be. She continues to wear loose clothing to allow air to get to the rash.     Mariel has a very busy schedule next week it was determined that the Community Paramedic will call Mariel on Monday July 13th and have a phone visit instead of an in person visit. Mariel was completely Ok with this. An in person visit is schedule for the following Monday 7/2/20 at 2pm

## 2020-07-14 DIAGNOSIS — Z79.4 TYPE 2 DIABETES MELLITUS WITH STAGE 3 CHRONIC KIDNEY DISEASE, WITH LONG-TERM CURRENT USE OF INSULIN (H): ICD-10-CM

## 2020-07-14 DIAGNOSIS — E11.22 TYPE 2 DIABETES MELLITUS WITH STAGE 3 CHRONIC KIDNEY DISEASE, WITH LONG-TERM CURRENT USE OF INSULIN (H): ICD-10-CM

## 2020-07-14 DIAGNOSIS — N18.30 TYPE 2 DIABETES MELLITUS WITH STAGE 3 CHRONIC KIDNEY DISEASE, WITH LONG-TERM CURRENT USE OF INSULIN (H): ICD-10-CM

## 2020-07-15 NOTE — TELEPHONE ENCOUNTER
insulin glargine (LANTUS SOLOSTAR) 100 UNIT/ML pen       Last Written Prescription Date:  6-4-2020  Last Fill Quantity: 15 ml,   # refills: 0  Last Office Visit : 12-3-19  Future Office visit:  none    Routing refill request to provider for review/approval because:  Insulin - refilled per clinic        Kathleen M Doege RN

## 2020-07-16 NOTE — PROGRESS NOTES
[facility] - CardioMEMS Reading Review    July 16, 2020   CardioMEMS reviewed.  Current diuretic: Bumex 4mg/2mg  Recent plan of care changes: none.     Current Threshold parameters:       Today's Waveform:       Readings:           RHC Date Wedge Pressure MEMS PAD during cath  (average of the 3 values)     7/11/2019 w/MEMS implant     20 mmHg   18 mmHg           Rosina Mays RN

## 2020-07-17 ENCOUNTER — OFFICE VISIT (OUTPATIENT)
Dept: URGENT CARE | Facility: URGENT CARE | Age: 75
End: 2020-07-17
Payer: MEDICARE

## 2020-07-17 ENCOUNTER — TELEPHONE (OUTPATIENT)
Dept: FAMILY MEDICINE | Facility: CLINIC | Age: 75
End: 2020-07-17

## 2020-07-17 ENCOUNTER — PATIENT OUTREACH (OUTPATIENT)
Dept: CARDIOLOGY | Facility: CLINIC | Age: 75
End: 2020-07-17

## 2020-07-17 VITALS
TEMPERATURE: 97.9 F | OXYGEN SATURATION: 97 % | DIASTOLIC BLOOD PRESSURE: 66 MMHG | SYSTOLIC BLOOD PRESSURE: 128 MMHG | HEART RATE: 62 BPM

## 2020-07-17 DIAGNOSIS — J41.0 SIMPLE CHRONIC BRONCHITIS (H): ICD-10-CM

## 2020-07-17 DIAGNOSIS — N76.0 VAGINITIS AND VULVOVAGINITIS: Primary | ICD-10-CM

## 2020-07-17 PROCEDURE — 99213 OFFICE O/P EST LOW 20 MIN: CPT | Performed by: FAMILY MEDICINE

## 2020-07-17 RX ORDER — ALBUTEROL SULFATE 90 UG/1
2 AEROSOL, METERED RESPIRATORY (INHALATION) 4 TIMES DAILY
Qty: 1 INHALER | Refills: 3 | Status: SHIPPED | OUTPATIENT
Start: 2020-07-17 | End: 2020-08-19 | Stop reason: ALTCHOICE

## 2020-07-17 RX ORDER — FLUCONAZOLE 150 MG/1
150 TABLET ORAL DAILY
Qty: 3 TABLET | Refills: 0 | Status: SHIPPED | OUTPATIENT
Start: 2020-07-17 | End: 2020-07-20

## 2020-07-17 NOTE — TELEPHONE ENCOUNTER
Reason for call:  Patient reporting a symptom    Symptom or request: patient has an infection in her groin, and needs a prescription sent in to pharmacy.    Please call to discuss.    Duration (how long have symptoms been present): ongoing    Have you been treated for this before? Yes    Additional comments: call patient today please    Phone Number patient can be reached at:  Home number on file [phone] (home)    Best Time:  asap    Can we leave a detailed message on this number:  YES    Call taken on 7/17/2020 at 4:06 PM by Liz Reza

## 2020-07-17 NOTE — TELEPHONE ENCOUNTER
Called Mariel to review Cardiomems readings which have been in her goal PAD range of 14-18. Reviewed with Mariel, she had no further questions.   Rosina Mays RN

## 2020-07-17 NOTE — TELEPHONE ENCOUNTER
Spoke to pt and she said her groin rash isn't better from the mixture that Chantal Connell MD had advised her to make  Now it is fire engine red-  Advised pt to go to urgent care- as she is wanting an abx and I am concerned about possible cellulitis  Discussed rationale and pt agreed on that plan  Opal To RN
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
## 2020-07-20 ENCOUNTER — ALLIED HEALTH/NURSE VISIT (OUTPATIENT)
Dept: BEHAVIORAL HEALTH | Facility: CLINIC | Age: 75
End: 2020-07-20
Payer: COMMERCIAL

## 2020-07-20 VITALS
HEART RATE: 66 BPM | SYSTOLIC BLOOD PRESSURE: 138 MMHG | DIASTOLIC BLOOD PRESSURE: 72 MMHG | RESPIRATION RATE: 12 BRPM | OXYGEN SATURATION: 97 %

## 2020-07-20 DIAGNOSIS — I10 HYPERTENSION GOAL BP (BLOOD PRESSURE) < 130/80: Primary | Chronic | ICD-10-CM

## 2020-07-20 DIAGNOSIS — N18.30 TYPE 2 DIABETES MELLITUS WITH STAGE 3 CHRONIC KIDNEY DISEASE, WITH LONG-TERM CURRENT USE OF INSULIN (H): Chronic | ICD-10-CM

## 2020-07-20 DIAGNOSIS — Z79.4 TYPE 2 DIABETES MELLITUS WITH STAGE 3 CHRONIC KIDNEY DISEASE, WITH LONG-TERM CURRENT USE OF INSULIN (H): Chronic | ICD-10-CM

## 2020-07-20 DIAGNOSIS — E11.22 TYPE 2 DIABETES MELLITUS WITH STAGE 3 CHRONIC KIDNEY DISEASE, WITH LONG-TERM CURRENT USE OF INSULIN (H): Chronic | ICD-10-CM

## 2020-07-20 PROCEDURE — 99207 C COMMUNITY PARAMEDIC - PATIENT NOT BILLABLE: CPT

## 2020-07-20 ASSESSMENT — PAIN SCALES - GENERAL: PAINLEVEL: SEVERE PAIN (7)

## 2020-07-20 ASSESSMENT — ACTIVITIES OF DAILY LIVING (ADL): DEPENDENT_IADLS:: SHOPPING

## 2020-07-20 NOTE — PROGRESS NOTES
Community Paramedics Follow-up Visit  July 20, 2020  TIME: 1400    Mariel Ribeiro is a 75 year old female being seen at home for a follow-up visit.    Present at appointment: Patient    Primary Diagnosis: Frailty/Failure to thrive    Chief Complaint   Patient presents with     Outreach       [place] Utilization:   Clinic Utilization  Difficulty keeping appointments:: No  Compliance Concerns: No  No-Show Concerns: No  No PCP office visit in Past Year: No  Utilization    Last refreshed: 7/27/2020 10:01 AM:  Hospital Admissions 1           Last refreshed: 7/27/2020 10:01 AM:  ED Visits 3           Last refreshed: 7/27/2020 10:01 AM:  No Show Count (past year) 3              Current as of: 7/27/2020 10:01 AM              /72   Pulse 66   Resp 12   SpO2 97%     Clinical Concerns:  Current Medical Concerns:  Diabetes    Current Behavioral Concerns: none    Education Provided to patient: none   Medication set up? No  Pill Box issued: No  Scale issued: No  Health Maintenance Reviewed: Due/Overdue yes    Clinical Pathway: None    No  Face to Face in Home / Community    Recent blood sugars:   Fasting numbers:  7/6 - 136  7/7 - 196  7/8 - 156  7/9 - 176  7/10 - 176  7/11 - 161  7/12 - 221  7/13 - 168  7/14 - 203  7/15 - 176  7/16 - 167  7/17 - 156  7/18 - 191  7/19 - 189  7/20 - 194    Review of Symptoms/PE    Skin: negative  Eyes: negative  Ears/Nose/Throat: negative  Respiratory: No shortness of breath, dyspnea on exertion, cough, or hemoptysis  Cardiovascular: negative  Gastrointestinal: negative  Genitourinary: negative  Musculoskeletal: negative  Neurologic: negative  Psychiatric: negative    Pain Management::  Pain (GOAL):: Yes  Type: Acute (<3mo)  Location of chronic pain:: coccyx-? fracture due to fall per chiropractor  Radiating: No  Progression: Waxing and Waning  Description of pain: Aching, Nagging  Chronic pain severity:: 5  Limitation of routine activities due to chronic pain:: Yes(limits walking due  to pain)  Description: Able to do most things most days with some rest  Alleviating Factors: Rest, Ice, Heat, Other(got thick mattress pad for her bed; sits on pillows)  Aggravating Factors: Activity    Medication Reconciliation  Medication adherence problem (GOAL):: No  Knowledgeable about how to use meds:: Yes  Medication side effects suspected:: No    Diet/Exercise/Sleep  Diet:: Diabetic diet  Inadequate nutrition (GOAL):: No  Tube Feeding: No  Inadequate activity/exercise (GOAL):: Yes  Significant changes in sleep pattern (GOAL): No    Plan:     Time spent with patient: 60    The patient meets one or more of the following criteria:  * Has been identified by their primary care provider at risk of nursing home placement    Acute concern/Follow-up recommendations: TBD    Next CP visit scheduled: 8/4/20 @2pm    Issues for Provider to follow up on: Community Paramedic visited with Mariel at her home, Mariel indicated she was not feeling very well. Mariel still is fighting the pain and burning in her vaginal area from the rash she has had for approx a month now. Mariel indicated she did go to Urgent Care due to the severity of the pain and irritation and the duration of the rash. Confirmed with Mariel that she is putting ample amounts of the creams on the area of the rash. Lung sounds are clear, no other concerns will return in two weeks.     Provider follow up visit needed: 11/19/20

## 2020-07-21 NOTE — PROGRESS NOTES
[facility] - CardioMEMS Reading Review    July 21, 2020   CardioMEMS reviewed.  Current diuretic: Bumex 4 mg/2 mg  Recent plan of care changes: none    Current Threshold parameters:       Today's Waveform:       Readings:           RHC Date Wedge Pressure MEMS PAD during cath  (average of the 3 values)     7/11/2019 w/MEMS implant     20 mmHg   18 mmHg           Rosina Mays RN

## 2020-07-23 ENCOUNTER — ALLIED HEALTH/NURSE VISIT (OUTPATIENT)
Dept: CARDIOLOGY | Facility: CLINIC | Age: 75
End: 2020-07-23
Attending: NURSE PRACTITIONER
Payer: MEDICARE

## 2020-07-23 ENCOUNTER — PATIENT OUTREACH (OUTPATIENT)
Dept: NURSING | Facility: CLINIC | Age: 75
End: 2020-07-23
Payer: MEDICARE

## 2020-07-23 DIAGNOSIS — M17.10 ARTHRITIS OF KNEE: Primary | ICD-10-CM

## 2020-07-23 DIAGNOSIS — I50.32 CHRONIC DIASTOLIC HEART FAILURE (H): Primary | ICD-10-CM

## 2020-07-23 PROCEDURE — 93264 REM MNTR WRLS P-ART PRS SNR: CPT | Performed by: NURSE PRACTITIONER

## 2020-07-23 NOTE — PROGRESS NOTES
Remote monitoring of Pulmonary Artery Pressures via CardioMEMS device reviewed at least weekly and as needed with CORE nursing. Diuretics adjusted accordingly when needed.      Billing for 6/23/2020 through 7/22/2020 reviewed. Continue to monitor    Austin CORNEJO NP-C

## 2020-07-29 NOTE — PROGRESS NOTES
Clinic Care Coordination Contact    Follow Up Progress Note      Assessment: pro-active outreach call placed to patient. Had visit to urgent care provider one week ago due to ongoing vulvo-vaginal rash. She continues to use creams to site as recommended by PCP and  provider and is completed diflucan course. Feels somewhat improved. Notes she often has to get up to void in the middle of the night and leaves her CPAP machine running. By the time she returns the pressure is too high and she can't tolerate using it for the remainder of the night. Continues to see chiropractor regularly for back and knee pain. Feels this is improving. Knee feels more stable and she has had no falls.     Goals addressed this encounter:   Goals Addressed                 This Visit's Progress      Functional (pt-stated)   70%     Goal Statement: I will continue to monitor my right knee instability for the next 1-3 months  Date Goal set: 7-2-2020  Barriers: declines in-person appointments/evaluation with specialist due to fear of jt COVID-19   Strengths: willing to consider with considerable worsening of knee instability  Date to Achieve By: 10-2-2020  Patient expressed understanding of goal: yes  Action steps to achieve this goal:  1. I will continue to see my chiropractor for routine visits for acupuncture treatments which I feel may be helping my knee instability   2. I will consider wearing ACE wraps from my knee to thigh to see if this improves instability  3. I will call my PCP for plan if I am experiencing worsening to discuss plan          Medical (pt-stated)   50%     Goal Statement: I agree to see my oncologist for an in-person visit as discussed if I have any increase in size of my breast masses within the next 3-6 months  Date Goal set: 7-2-2020  Barriers: declines going to [facility] for visit due to fear of jt COVID-19 virus  Strengths: trusts her oncologist and will consider in-person visit if  needed  Date to Achieve By: 1-2-2021  Patient expressed understanding of goal: yes  Action steps to achieve this goal:  1. I will check my breast masses for enlargement at interval recommended by my oncologist  2. I will contact my oncologist for an in-person appointment if I discover an enlargement in one or both of my breast masses  3. I will contact my oncologist if I still have concerns/fear of in-person visit if COVID-19 remains a pandemic concern to discuss a plan          Monitoring (pt-stated)   70%     2-Goal Statement: Goal Statement: I want to maintain my weight so that it meets the weight parameter set by my cardiologist and CORE clinic team for the next six months  Date Goal set: 5-8-2020  Barriers: at times forgets to weigh herself or write it down  Strengths: states that her roommate can remind her  Date to Achieve By: 10/1/2020  Patient expressed understanding of goal: yes  Action steps to achieve this goal:  1. I will ask my roommate to remind me to weigh myself and record the weight daily  2. I will call my clinic or CORE clinic with a daily weight gain over 2 lbs in one day or 5 lbs in one week  3. I will update my cardiology team with my recorded weights at my appointments    5-4-2020: hasn't been able to weigh herself for two weeks but just got new batteries for her scale and she is able to weigh herself again             Intervention/Education provided during outreach: advised turn CPAP off if needing to get up in the night to void so she can better tolerate this when she returns to bed     Outreach Frequency: monthly    Plan:   Patient to call PCP with worsening vaginal symptoms  Care Coordinator will follow up in approximately one month    Vibha Carvajal RN  [facility]-Care Coordination  [facility]  [facility]  [phone]

## 2020-07-31 ENCOUNTER — MYC MEDICAL ADVICE (OUTPATIENT)
Dept: CARDIOLOGY | Facility: CLINIC | Age: 75
End: 2020-07-31

## 2020-07-31 NOTE — PROGRESS NOTES
Lets decrease from bumex 4/2 and Kcl 40 meq BID to bumex TO bumex 3/2 and kcl 40/20 x3 days. Then recheck cardiomems. Thanks! Yuni

## 2020-07-31 NOTE — PROGRESS NOTES
Called Mariel to review the recommendations below. No answer, voicemail left and asked her to check her [employer] messages.   Rosina Mays RN

## 2020-07-31 NOTE — PROGRESS NOTES
[facility] - CardioMEMS Reading Review    July 31, 2020   CardioMEMS reviewed and routed to Austin Miguel NP   Current diuretic: Bumex 4mg/2mg  Recent plan of care changes: none    Current Threshold parameters:       Today's Waveform:       Readings:           RHC Date Wedge Pressure MEMS PAD during cath  (average of the 3 values)     7/11/2019 w/MEMS implant     20 mmHg   18 mmHg           Rosina Mays RN

## 2020-08-03 ENCOUNTER — TRANSFERRED RECORDS (OUTPATIENT)
Dept: HEALTH INFORMATION MANAGEMENT | Facility: CLINIC | Age: 75
End: 2020-08-03

## 2020-08-03 NOTE — TELEPHONE ENCOUNTER
Called Mariel to follow up and make sure she got my message to reduce Bumex for 3 days. Did read [employer] message.  Today should be 3rd day on reduced dose. Will check cardiomems numbers tomorrow. Left her a voicemail and asked her to call back with any questions.   Rosina Mays RN

## 2020-08-04 ENCOUNTER — ALLIED HEALTH/NURSE VISIT (OUTPATIENT)
Dept: BEHAVIORAL HEALTH | Facility: CLINIC | Age: 75
End: 2020-08-04
Payer: COMMERCIAL

## 2020-08-04 DIAGNOSIS — E11.22 TYPE 2 DIABETES MELLITUS WITH STAGE 3 CHRONIC KIDNEY DISEASE, WITH LONG-TERM CURRENT USE OF INSULIN (H): ICD-10-CM

## 2020-08-04 DIAGNOSIS — Z79.4 TYPE 2 DIABETES MELLITUS WITH STAGE 3 CHRONIC KIDNEY DISEASE, WITH LONG-TERM CURRENT USE OF INSULIN (H): ICD-10-CM

## 2020-08-04 DIAGNOSIS — N18.30 TYPE 2 DIABETES MELLITUS WITH STAGE 3 CHRONIC KIDNEY DISEASE, WITH LONG-TERM CURRENT USE OF INSULIN (H): ICD-10-CM

## 2020-08-04 DIAGNOSIS — I10 HYPERTENSION GOAL BP (BLOOD PRESSURE) < 130/80: Primary | ICD-10-CM

## 2020-08-04 PROCEDURE — 99207 C COMMUNITY PARAMEDIC - PATIENT NOT BILLABLE: CPT

## 2020-08-04 ASSESSMENT — ACTIVITIES OF DAILY LIVING (ADL): DEPENDENT_IADLS:: SHOPPING

## 2020-08-04 NOTE — PROGRESS NOTES
[facility] - CardioMEMS Reading Review    August 4, 2020   CardioMEMS reviewed and routed to Austin Miguel.   Current diuretic: Bumex 3/2  Recent plan of care changes: Had been on bumex 4/2 and Kcl 40 meq BID. Decreased on Friday 7/31 to bumex 3/2 and kcl 40/20 x3 days. Plan to reevaluate after 3 days.     Current Threshold parameters:    Goal PAD 14-18      Readings:     7/31 - PAD 12  8/1 - PAD 14  8/2 - PAD 14  8/3- PAD 16      RHC Date Wedge Pressure MEMS PAD during cath  (average of the 3 values)     7/11/2019 w/MEMS implant     20 mmHg   18 mmHg           Rosina Mays RN

## 2020-08-04 NOTE — PROGRESS NOTES
Remote monitoring of Pulmonary Artery Pressures via CardioMEMS device reviewed at least weekly and as needed with CORE nursing. Diuretics adjusted accordingly.    Continue on 3/2 Bumex and continue to monitor. The PAD is still within her range goal range.

## 2020-08-04 NOTE — PROGRESS NOTES
Community Paramedics Follow-up Visit  August 4th, 2020  TIME: 1400    Mariel Ribeiro is a 75 year old female being seen at home for a follow-up visit.    Present at appointment: Patient    Primary Diagnosis: Frailty/Failure to thrive    Chief Complaint   Patient presents with     Outreach       [place] Utilization:   Clinic Utilization  Difficulty keeping appointments:: No  Compliance Concerns: No  No-Show Concerns: No  No PCP office visit in Past Year: No  Utilization    Last refreshed: 8/10/2020  4:42 PM:  Hospital Admissions 1           Last refreshed: 8/10/2020  4:42 PM:  ED Visits 3           Last refreshed: 8/10/2020  4:42 PM:  No Show Count (past year) 2              Current as of: 8/10/2020  4:42 PM              /53   Pulse 76   Resp 12   SpO2 95%     Clinical Concerns:  Current Medical Concerns:  diabetes    Current Behavioral Concerns: none    Education Provided to patient: none   Medication set up? No  Pill Box issued: No  Scale issued: No  Health Maintenance Reviewed: Due/Overdue yes    Clinical Pathway: None    No  Face to Face in Home / Community    Recent blood sugars and blood pressures:    7/21 150 104/52  7/22 183 105/51  7/23 174 113/48  7/24 208 93/47  7/25 197 139/47  7/26 146 111/54  7/27 186 102/47  7/28 160 99/46  7/29 211 91/46  7/30 140 108/97  7/31 173 102/42  8/1 211 112/49  8/2 174 114/48  8/3 155 106/53  8/4 176 109/53    Review of Symptoms/PE    Skin: rash, itching, to vaginal area  Eyes: hard to keep open  Ears/Nose/Throat: negative  Respiratory: No shortness of breath, dyspnea on exertion, cough, or hemoptysis  Cardiovascular: negative  Gastrointestinal: negative  Genitourinary: negative  Musculoskeletal: negative  Neurologic: negative  Psychiatric: negative    Pain Management::  Pain (GOAL):: Yes  Type: Acute (<3mo)  Location of chronic pain:: coccyx-? fracture due to fall per chiropractor  Radiating: No  Progression: Waxing and Waning  Description of pain: Aching,  Nagging  Chronic pain severity:: 5  Limitation of routine activities due to chronic pain:: Yes(limits walking due to pain)  Description: Able to do most things most days with some rest  Alleviating Factors: Rest, Ice, Heat, Other(got thick mattress pad for her bed; sits on pillows)  Aggravating Factors: Activity    Medication Reconciliation  Medication adherence problem (GOAL):: No  Knowledgeable about how to use meds:: Yes  Medication side effects suspected:: No    Diet/Exercise/Sleep  Diet:: Diabetic diet  Inadequate nutrition (GOAL):: No  Tube Feeding: No  Inadequate activity/exercise (GOAL):: Yes  Significant changes in sleep pattern (GOAL): No      Time spent with patient: 60    The patient meets one or more of the following criteria:  * Has been identified by their primary care provider at risk of nursing home placement    Acute concern/Follow-up recommendations: TBD    Next CP visit scheduled: 8/18/20    Issues for Provider to follow up on: Community Paramedic visited with Mariel in her home today. Mariel went to a dermatologist for her vaginal rash/irritation she has been dealing with for over a month and was given some new medication and was told to only use a small amount of the Desitin. She starts this new medication today, 8/4.     Mariel states there is something wrong with both eyes that started a few days ago. She indicates it is hard to open and keep both eyes open. She is unsure the cause and denies getting anything in her eyes. When she tries to open them they water badly and are very sensitive to light. Advised Mariel if her eyes do not start to improve she may need to call the clinic.     The lump on Mariel's neck appears to be getting bigger however, Mariel refuses to go to the clinic over at [address] due to Covid-19. Community Paramedic will try to stop by and see what their protocol is for wearing masks and what precautions they are taking as Mariel is very skeptical about going into the building.      Mariel indicated she ran out of testing strips for her glucometer and insurance was not going to pay for more of them. She still has not gotten things figured out with the Kaylah Freestyle. Community Paramedic wrote down Mariel's Pharmacy information along with her insurance information and will see if CP can try to find out what is needed to get the FreeStyle Kaylah ordered and covered under insurance and to find out why the testing strips are not being covered suddenly by insurance.     Provider follow up visit needed: 11/19/2020

## 2020-08-05 NOTE — PROGRESS NOTES
I called Mariel to review that she should continue taking reduced dose of Bumex 3 mg in the AM, 2 mg in the PM. HAd left message about this and sent iSOCOt message which she read.  I called her today and she tells me at some point she was told to reduce her Bumex to 4 mg daily and that is all she takes. Doesn't take an afternoon dose. Can't find documentation of this. Cardiomems now stable and within her range. Will continue to monitor. Will update provider that she is taking 4 mg daily Bumex. She does confirm she is taking Potassium at 20 mEq BID as prescribed.

## 2020-08-11 VITALS
DIASTOLIC BLOOD PRESSURE: 53 MMHG | SYSTOLIC BLOOD PRESSURE: 109 MMHG | OXYGEN SATURATION: 95 % | RESPIRATION RATE: 12 BRPM | HEART RATE: 76 BPM

## 2020-08-12 ENCOUNTER — PATIENT OUTREACH (OUTPATIENT)
Dept: CARDIOLOGY | Facility: CLINIC | Age: 75
End: 2020-08-12

## 2020-08-12 NOTE — TELEPHONE ENCOUNTER
Called Mariel as we received ziopatch back with no data. She reports she didn't wear it because she didn't know if she was supposed to. She reports her palpitations or 'different feelings' are not worsening. She will call us if they worsen or get more frequent as we can send out another zio if needed. No further questions at this time.

## 2020-08-13 NOTE — PROGRESS NOTES
[facility] CORE Clinic - CardioMEMS Reading Review    August 13, 2020   CardioMEMS reviewed.  Current diuretic:   Recent plan of care changes:     Current Threshold parameters:     Today's Waveform:       Readings:           RHC Date Wedge Pressure MEMS PAD during cath  (average of the 3 values)     7/11/2019 w/MEMS implant     20 mmHg   18 mmHg           Rosina Mays RN

## 2020-08-14 ENCOUNTER — PATIENT OUTREACH (OUTPATIENT)
Dept: CARDIOLOGY | Facility: CLINIC | Age: 75
End: 2020-08-14

## 2020-08-14 ENCOUNTER — PATIENT OUTREACH (OUTPATIENT)
Dept: CARE COORDINATION | Facility: CLINIC | Age: 75
End: 2020-08-14

## 2020-08-14 DIAGNOSIS — J41.0 SIMPLE CHRONIC BRONCHITIS (H): ICD-10-CM

## 2020-08-14 RX ORDER — ALBUTEROL SULFATE 90 UG/1
2 AEROSOL, METERED RESPIRATORY (INHALATION) 4 TIMES DAILY
Qty: 1 INHALER | Refills: 3 | Status: CANCELLED | OUTPATIENT
Start: 2020-08-14

## 2020-08-14 NOTE — TELEPHONE ENCOUNTER
Received voice mail from patient asking me to call her. Called patient. She reports that her albuterol inhaler that she received with last refill looks and feels different and that she has to inhaler 4 to 5 times to get relief. States this is resulting in increased shortness of breath. She reported this to the CORE clinic who told her to contact her primary care clinic. Message routed to triage team for follow up.     Vibha Carvajal RN  [facility] Primary Care-Care Coordination  [facility]  [facility]  [phone]

## 2020-08-14 NOTE — TELEPHONE ENCOUNTER
Left message on voicemail for pt to cb to clinic for triage  Spoke to Pharmacy-   She said to resend an order and write which one we want to fill it with   If pt wants proair or ventolin vs the proventil hfa that she got that she doesn't think is helping  Pharmacy said red is proair  And grey/blue is ventolin    Opal To RN

## 2020-08-14 NOTE — TELEPHONE ENCOUNTER
Called Mariel to review Cardiomems. She reports feeling some shortness of breath but thinks its due to the new inhaler she is using and she'd like to go back to her ProAir. She thinks that would help her shortness of breath. I encouraged her to call her primary care doctor to see if they can prescribe the proair for her. I told her if this doesn't improve her breathing she should call us so we can bring her in and evaluate.   Rosina Mays RN

## 2020-08-15 NOTE — LETTER
10/16/2018       RE: Mariel Ribeiro  [address] [place] [address]     Dear Colleague,    Thank you for referring your patient, Mariel Ribeiro, to the [facility] EMG at [facility]. Please see a copy of my visit note below.        [facility]  Electrodiagnostic Laboratory    Nerve Conduction & EMG Report          Patient:       Mariel Ribeiro  Patient ID:    [id number]  Gender:        Female  YOB: 1945  Age:           73 Years 4 Months      Referring Provider: Palua Hdz NP    History & Examination:    73 year old woman with history of type 2 diabetes, loss of sensation at her distal lower extremities, frequent syncopal episodes and leg weakness. Query polyneuropathy/myopathy.    Techniques: Motor conduction studies were done with surface recording electrodes. Sensory conduction studies were performed with surface electrodes, unless indicated otherwise by (n), designating the use of subdermal recording electrodes. Temperature was monitored and recorded throughout the study. Upper extremities were maintained at a temperature of 32 degrees Centigrade or higher.  Lower extremities were maintained at a temperature of 31degrees Centigrade or higher. EMG was done with a concentric needle electrode.     Results:    Right radial antidromic sensory NCS was normal. Right sural SNAP was absent. Right median motor NCS was normal. Right deep peroneal (recorded from EDB) and tibial motor NCSs showed normal distal latencies and markedly attenuated distal CMAP amplitudes. Right common peroneal motor NCS recorded from the tibialis anterior (TA) showed no response. Please note that lower extremity nerve conduction studies were very challenging due to the combination of severe leg edema and marked obesity, making proximal nerve stimulation nearly impossible. Right median F wave latencies were minimally prolonged. EMG of right tibialis anterior (TA) and  peroneus tertius showed 3+ fibs/PSWs and moderately reduced recruitment of large, long duration, stable MUPs. EMG of right medial gastrocnemius, tibialis posterior, vastus lateralis, and semitendinosus was normal.    Interpretation:    Abnormal study. There is electrodiagnostic evidence of:    1) Markedly attenuated lower extremity motor and sensory nerve responses. This may be attributed to technical factors, specifically severe leg edema and morbid obesity, making supramaximal nerve stimulation challenging, or to an axonal, length dependent sensorimotor polyneuropathy. Clinical correlation is necessary.  2) Denervation changes at the right lower extremity most consistent with a right deep or common peroneal mononeuropathy. A right L5 radiculopathy is less likely, but cannot be fully excluded, because the right gluteus medius and L5 paraspinal muscles were not studied (owing to technical difficulties sampling those muscles in a very obese patient).    There was no electrodiagnostic evidence of myopathy.     EMG Physician:    Hector Xiong MD       Sensory NCS      Nerve / Sites Rec. Site Onset Peak NP Amp Ref. PP Amp Dist Alan Ref. Temp     ms ms  V  V  V cm m/s m/s  C   R RADIAL - Snuff      Forearm Snuff 1.82 2.40 23.1 15.0 43.9 10 54.9 48.0 32.6   R SURAL - Lat Mall 60      Calf Ankle NR NR NR 5.0 NR 14 NR 38.0 31.6       Motor NCS      Nerve / Sites Rec. Site Lat Ref. Amp Ref. Rel Amp Dist Alan Ref. Dur. Area Temp.     ms ms mV mV % cm m/s m/s ms %  C   R MEDIAN - APB      Wrist APB 4.06 4.40 5.6 5.0 100 8   5.42 100 32.9      Elbow APB 8.91  5.0  90.4 23 47.5 48.0 6.09 90.9 32.7   R DEEP PERONEAL - EDB- Severe edema      Ankle EDB 4.64 6.00 0.4 2.5 100 8   6.51 100 31.2      FibHead EDB        38.0   31.3   R TIBIAL - AH- Severe edema      Ankle+ AH 4.64 6.00 1.3 4.0 100 8   4.95 100 31   R PERONEAL - Tib Ant- Severe edema      Fib Head Tib Ant NR  NR  NR    NR NR 31.3       F  Wave      Nerve Min F Lat  Max F Lat Mean FLat Temp.    ms ms ms  C   R MEDIAN 30.78 36.93 32.76 32.4       EMG Summary Table     Spontaneous MUAP Recruitment    IA Fib/PSW Fasc H.F. Amp Dur. PPP Pattern   R. TIB ANTERIOR N 3+ None None 2+ 2+ N Moderately Reduced   R. GASTROCN (MED) N None None None N N N N   R. VAST LATERALIS N None None None N N N N   R. SEMITENDIN N None None None N N N N   R. PERON TERTIUS N 3+ None None 2+ 2+ N Moderately Reduced   R. TIB POSTERIOR N None None None N N N N                          Again, thank you for allowing me to participate in the care of your patient.      Sincerely,    Hector Xiong MD       87

## 2020-08-17 NOTE — PROGRESS NOTES
Clinic Care Coordination Contact    Follow Up Progress Note      Assessment: pro-active outreach call placed to patient. She is doing well overall but reported shortness of breath occasionally to CORE clinic nurse. She sounds short of breath initially with speaking but this does resolve fairly quickly. She is using her inhaler 4-5 times per day because she thinks it is a new brand and doesn't work as well as her previous brand. Saw a dermatologist and her vaginal rash is much improved. Community paramedic will see her next week. Having social time in her gazebo with friends while all are wearing masks.    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 I will continue to see my chiropractor for routine visits for acupuncture treatments which I feel may be helping my knee instability   2. I will consider wearing ACE wraps from my knee to thigh to see if this improves instability  3. I will call my PCP for plan if I am experiencing worsening to discuss plan          Medical (pt-stated)   10%     Goal Statement: I agree to see my oncologist for an in-person visit as discussed if I have any increase in size of my breast masses within the next 3-6 months  Date Goal set: 7-2-2020  Barriers: declines going to [facility] for visit due to fear of jt COVID-19 virus  Strengths: trusts her oncologist and will consider in-person visit if needed  Date to Achieve By: 1-2-2021  Patient expressed understanding of goal: yes  Action  steps to achieve this goal:  1. I will check my breast masses for enlargement at interval recommended by my oncologist  2. I will contact my oncologist for an in-person appointment if I discover an enlargement in one or both of my breast masses  3. I will contact my oncologist if I still have concerns/fear of in-person visit if COVID-19 remains a pandemic concern to discuss a plan          Monitoring (pt-stated)   On Hold     2-Goal Statement: Goal Statement: I want to maintain my weight so that it meets the weight parameter set by my cardiologist and CORE clinic team for the next six months  Date Goal set: 5-8-2020  Barriers: at times forgets to weigh herself or write it down  Strengths: states that her roommate can remind her  Date to Achieve By: 10/1/2020  Patient expressed understanding of goal: yes  Action steps to achieve this goal:  1. I will ask my roommate to remind me to weigh myself and record the weight daily  2. I will call my clinic or CORE clinic with a daily weight gain over 2 lbs in one day or 5 lbs in one week  3. I will update my cardiology team with my recorded weights at my appointments    5-4-2020: hasn't been able to weigh herself for two weeks but just got new batteries for her scale and she is able to weigh herself again             Intervention/Education provided during outreach: none     Outreach Frequency: monthly    Plan:   Message sent to PCP triage team to see if inhaler can be change back to previous inhaler  Care Coordinator will follow up in 4 weeks.    Vibha Carvajal RN  [facility] Primary Care-Care Coordination  [facility] Primary [facility]  [facility]  [phone]

## 2020-08-18 ENCOUNTER — ALLIED HEALTH/NURSE VISIT (OUTPATIENT)
Dept: BEHAVIORAL HEALTH | Facility: CLINIC | Age: 75
End: 2020-08-18
Payer: COMMERCIAL

## 2020-08-18 VITALS
HEART RATE: 60 BPM | WEIGHT: 293 LBS | BODY MASS INDEX: 49.42 KG/M2 | RESPIRATION RATE: 18 BRPM | SYSTOLIC BLOOD PRESSURE: 98 MMHG | DIASTOLIC BLOOD PRESSURE: 62 MMHG | TEMPERATURE: 97.6 F | OXYGEN SATURATION: 95 %

## 2020-08-18 DIAGNOSIS — E11.22 TYPE 2 DIABETES MELLITUS WITH STAGE 3 CHRONIC KIDNEY DISEASE, WITH LONG-TERM CURRENT USE OF INSULIN (H): Primary | ICD-10-CM

## 2020-08-18 DIAGNOSIS — N18.30 TYPE 2 DIABETES MELLITUS WITH STAGE 3 CHRONIC KIDNEY DISEASE, WITH LONG-TERM CURRENT USE OF INSULIN (H): Primary | ICD-10-CM

## 2020-08-18 DIAGNOSIS — Z79.4 TYPE 2 DIABETES MELLITUS WITH STAGE 3 CHRONIC KIDNEY DISEASE, WITH LONG-TERM CURRENT USE OF INSULIN (H): Primary | ICD-10-CM

## 2020-08-18 PROCEDURE — 99207 C COMMUNITY PARAMEDIC - PATIENT NOT BILLABLE: CPT

## 2020-08-18 ASSESSMENT — ACTIVITIES OF DAILY LIVING (ADL): DEPENDENT_IADLS:: SHOPPING

## 2020-08-19 RX ORDER — ALBUTEROL SULFATE 90 UG/1
2 AEROSOL, METERED RESPIRATORY (INHALATION) EVERY 6 HOURS
Qty: 3 INHALER | Refills: 0 | Status: SHIPPED | OUTPATIENT
Start: 2020-08-19 | End: 2021-09-21

## 2020-08-19 NOTE — PROGRESS NOTES
Community Paramedics Follow-up Visit  August 18, 2020  TIME: 1200    Mariel Ribeiro is a 75 year old female being seen at home for a follow-up visit.    Present at appointment: Patient    Primary Diagnosis: Frailty/Failure to thrive    Chief Complaint   Patient presents with     Heart Failure       [place] Utilization:   Clinic Utilization  Difficulty keeping appointments:: No  Compliance Concerns: No  No-Show Concerns: No  No PCP office visit in Past Year: No  Utilization    Last refreshed: 8/19/2020  7:32 AM:  Hospital Admissions 1           Last refreshed: 8/19/2020  7:32 AM:  ED Visits 3           Last refreshed: 8/19/2020  7:32 AM:  No Show Count (past year) 2              Current as of: 8/19/2020  7:32 AM              BP 98/62   Pulse 60   Temp 97.6  F (36.4  C)   Resp 18   Wt 138.9 kg (306 lb 3.2 oz)   SpO2 95%   BMI 49.42 kg/m      Clinical Concerns:  Current Medical Concerns:  SOB, rash on R flank    Current Behavioral Concerns: na    Education Provided to patient: na   Medication set up? No  Pill Box issued: Yes  Scale issued: Yes  Health Maintenance Reviewed: Due/Overdue     Clinical Pathway: None    No  Face to Face in Home / Community    Recent blood sugars: average for past month 145    Review of Symptoms/PE    Skin: rash  Eyes: negative  Ears/Nose/Throat: negative  Respiratory: Shortness of breath-   Cardiovascular: dyspnea on exertion  Gastrointestinal: negative  Genitourinary: negative  Musculoskeletal: negative  Neurologic: numbness or tingling of hands  Psychiatric: anxiety and depression    Pain Management::  Pain (GOAL):: Yes  Type: Acute (<3mo)  Location of chronic pain:: coccyx-? fracture due to fall per chiropractor  Radiating: No  Progression: Waxing and Waning  Description of pain: Aching, Nagging  Chronic pain severity:: 5  Limitation of routine activities due to chronic pain:: Yes(limits walking due to pain)  Description: Able to do most things most days with some  rest  Alleviating Factors: Rest, Ice, Heat, Other(got thick mattress pad for her bed; sits on pillows)  Aggravating Factors: Activity    Medication Reconciliation  Medication adherence problem (GOAL):: No  Knowledgeable about how to use meds:: Yes  Medication side effects suspected:: No    Diet/Exercise/Sleep  Diet:: Diabetic diet  Inadequate nutrition (GOAL):: No  Tube Feeding: No  Inadequate activity/exercise (GOAL):: Yes  Significant changes in sleep pattern (GOAL): No    Plan:     Time spent with patient: 60    The patient meets one or more of the following criteria:  * Has received hospital emergency department services three or more times in four consecutive months within a twelve month period    Acute concern/Follow-up recommendations: Follow up with providers in person when feel safe.    Next CP visit scheduled: 9/1/2020    Issues for Provider to follow up on: The pt reports feeling ok overall.  She received a new inhaler that has a different attachment which does not deliver the medication as well as her last inhaler.  She would like to go back to the old style if possible.  The pt would really like to begin using the Freestyle Kaylah.  She has a prescription for it but the sensors cost over 100.00 a month and is cost prohibitive.  Is there a way to get those covered by insurance?  She reports the neuropathy in her fingers make the glucometer difficult to use and very painful.     The pt has a lump on the R side of her throat that continues to grow and is about the size of a golf ball.  She is concerned about going to have it looked at due to Covid.       Provider follow up visit needed: TBD

## 2020-08-22 ENCOUNTER — ALLIED HEALTH/NURSE VISIT (OUTPATIENT)
Dept: CARDIOLOGY | Facility: CLINIC | Age: 75
End: 2020-08-22
Attending: NURSE PRACTITIONER
Payer: MEDICARE

## 2020-08-22 DIAGNOSIS — I50.32 CHRONIC DIASTOLIC HEART FAILURE (H): Primary | ICD-10-CM

## 2020-08-22 PROCEDURE — 93264 REM MNTR WRLS P-ART PRS SNR: CPT | Mod: ZP | Performed by: NURSE PRACTITIONER

## 2020-08-25 NOTE — PROGRESS NOTES
Remote monitoring of Pulmonary Artery Pressures via CardioMEMS device reviewed at least weekly and as needed with CORE nursing. Diuretics adjusted accordingly.    Monthly billing cycle complete- billing dates of 7/23/2020 through 8/21/2020.    [place] [facility]

## 2020-08-26 NOTE — PROGRESS NOTES
[facility] CORE Clinic - CardioMEMS Reading Review    August 26, 2020   CardioMEMS reviewed.  Current diuretic: Bumex 4 mg daily?   Recent plan of care changes:     Current Threshold parameters:       Today's Waveform:       Readings:           RHC Date Wedge Pressure MEMS PAD during cath  (average of the 3 values)     7/1/2019 w/MEMS implant     20 mmHg   18 mmHg           Rosina Mays RN

## 2020-08-27 ENCOUNTER — PATIENT OUTREACH (OUTPATIENT)
Dept: CARDIOLOGY | Facility: CLINIC | Age: 75
End: 2020-08-27

## 2020-08-27 NOTE — TELEPHONE ENCOUNTER
Called Mariel to review her cardiomems readings from the last week.. Have all been in range except for 1 reading today which was low. She reports she hadn't had a bowel movement for 3 days and took some miralax and tea which caused her to have diarrhea the last few days. It is now resolving. I asked her to send in another reading which she did and it was within normal range at 16. Will continue to monitor her cardiomems. No changes based on numbers in range.   Rosina Mays RN

## 2020-09-01 ENCOUNTER — ALLIED HEALTH/NURSE VISIT (OUTPATIENT)
Dept: BEHAVIORAL HEALTH | Facility: CLINIC | Age: 75
End: 2020-09-01
Payer: COMMERCIAL

## 2020-09-01 VITALS
DIASTOLIC BLOOD PRESSURE: 78 MMHG | RESPIRATION RATE: 16 BRPM | TEMPERATURE: 96.6 F | OXYGEN SATURATION: 96 % | BODY MASS INDEX: 51.81 KG/M2 | HEART RATE: 68 BPM | WEIGHT: 293 LBS | SYSTOLIC BLOOD PRESSURE: 122 MMHG

## 2020-09-01 DIAGNOSIS — M54.50 BILATERAL LOW BACK PAIN, UNSPECIFIED CHRONICITY, UNSPECIFIED WHETHER SCIATICA PRESENT: Primary | ICD-10-CM

## 2020-09-01 PROCEDURE — 99207 C COMMUNITY PARAMEDIC - PATIENT NOT BILLABLE: CPT

## 2020-09-01 ASSESSMENT — ACTIVITIES OF DAILY LIVING (ADL): DEPENDENT_IADLS:: SHOPPING

## 2020-09-01 NOTE — PROGRESS NOTES
Community Paramedics Follow-up Visit  September 1, 2020  TIME: 1300    Mariel Ribeiro is a 75 year old female being seen at home for a follow-up visit.    Present at appointment:  patient         Chief Complaint   Patient presents with     Outreach     CHF       [place] Utilization:      Utilization    Last refreshed: 9/1/2020 10:09 AM:  Hospital Admissions 1           Last refreshed: 9/1/2020 10:09 AM:  ED Visits 3           Last refreshed: 9/1/2020 10:09 AM:  No Show Count (past year) 2              Current as of: 9/1/2020 10:09 AM              /78   Pulse 68   Temp 96.6  F (35.9  C)   Resp 16   Wt 145.6 kg (321 lb)   SpO2 96%   BMI 51.81 kg/m      Clinical Concerns:  Current Medical Concerns:  CHF, Back pain  Current Behavioral Concerns: Concern to leave home due to Covid.    Education Provided to patient: None   Medication set up? No  Pill Box issued: No  Scale issued: No  Health Maintenance Reviewed:      Clinical Pathway: None    No  Face to Face in Home / Community    Recent blood sugars: 180 nonfasting    Review of Symptoms/PE    Skin: negative  Eyes: negative  Ears/Nose/Throat: negative  Respiratory: Dyspnea on exertion-   Cardiovascular: negative, dyspnea on exertion and lower extremity edema  Gastrointestinal: constipation  Genitourinary: negative  Musculoskeletal: back pain  Neurologic: numbness or tingling of hands and numbness or tingling of feet  Psychiatric: negative    Pain Management::                 Plan:     Time spent with patient: 60    The patient meets one or more of the following criteria:  * Has received hospital emergency department services three or more times in four consecutive months within a twelve month period    Acute concern/Follow-up recommendations: F/U with providers.    Next CP visit scheduled: 9/9/2020    Issues for Provider to follow up on:  The pt is wondering about a variance/prescription for the Freestyle Kaylah sensors.  She is having difficulty using her  glucometer due to neuropathy in her hands.      The pt is experiencing lower back pain that has is chronic and would like a referral to the Spine Clinic at [facility] is possible.  She reports that she was told by Cardiology that she should wait for 2 years to think about surgery and it has been about that time.  She would like to see a specialist about her pain.     The pt would like to change her visit with Oncology 9/28/2020 to a video visit and was told to call scheduling to facilitate this    Provider follow up visit needed: Multiple upcoming

## 2020-09-02 NOTE — PROGRESS NOTES
[facility] CORE Clinic - CardioMEMS Reading Review    September 2, 2020   CardioMEMS reviewed.   Current diuretic: Bumex 4 mg daily  Recent plan of care changes: none  Current Threshold parameters:       Today's Waveform:       Readings:

## 2020-09-09 NOTE — PROGRESS NOTES
[facility] CORE Clinic - CardioMEMS Reading Review    September 9, 2020   CardioMEMS reviewed.  Current diuretic: Bumex 4 mg daily  Recent plan of care changes: none    Current Threshold parameters:       Today's Waveform:       Readings:           RHC Date Wedge Pressure MEMS PAD during cath  (average of the 3 values)     7/2/2019 w/MEMS implant     20 mmHg   18 mmHg           Rosina Mays RN

## 2020-09-10 ENCOUNTER — TRANSFERRED RECORDS (OUTPATIENT)
Dept: HEALTH INFORMATION MANAGEMENT | Facility: CLINIC | Age: 75
End: 2020-09-10

## 2020-09-10 LAB — RETINOPATHY: NEGATIVE

## 2020-09-11 ENCOUNTER — TELEPHONE (OUTPATIENT)
Dept: FAMILY MEDICINE | Facility: CLINIC | Age: 75
End: 2020-09-11

## 2020-09-11 DIAGNOSIS — M53.3 PAIN IN THE COCCYX: Primary | ICD-10-CM

## 2020-09-11 NOTE — TELEPHONE ENCOUNTER
Patient called requesting a order for MRI of her tailbone asap and prefers not to have a visit if possible    Patient states she has been seeing a Chiropractor for about 6 wks and they suggested getting an MRI so they can determine if her tailbone is effecting her spinal canal    Please advise and ok to leave a message

## 2020-09-14 NOTE — PROGRESS NOTES
[facility] - CardioMEMS Reading Review    September 14, 2020   CardioMEMS reviewed.  Current diuretic: Bumex 4 mg daily  Recent plan of care changes: none    Current Threshold parameters:       Today's Waveform:       Readings:           RHC Date Wedge Pressure MEMS PAD during cath  (average of the 3 values)     7/1/2019 w/MEMS implant     20 mmHg   18 mmHg           Rosina Mays RN

## 2020-09-14 NOTE — TELEPHONE ENCOUNTER
Left message on identified voice mail that order has been signed and patient can call [phone] to schedule.  Ting Andrade RN

## 2020-09-15 ENCOUNTER — ALLIED HEALTH/NURSE VISIT (OUTPATIENT)
Dept: BEHAVIORAL HEALTH | Facility: CLINIC | Age: 75
End: 2020-09-15
Payer: COMMERCIAL

## 2020-09-15 VITALS
BODY MASS INDEX: 51.81 KG/M2 | WEIGHT: 293 LBS | TEMPERATURE: 98.7 F | RESPIRATION RATE: 16 BRPM | SYSTOLIC BLOOD PRESSURE: 94 MMHG | DIASTOLIC BLOOD PRESSURE: 42 MMHG | HEART RATE: 70 BPM | OXYGEN SATURATION: 95 %

## 2020-09-15 DIAGNOSIS — E11.22 TYPE 2 DIABETES MELLITUS WITH STAGE 3 CHRONIC KIDNEY DISEASE, WITH LONG-TERM CURRENT USE OF INSULIN (H): Primary | ICD-10-CM

## 2020-09-15 DIAGNOSIS — N18.30 TYPE 2 DIABETES MELLITUS WITH STAGE 3 CHRONIC KIDNEY DISEASE, WITH LONG-TERM CURRENT USE OF INSULIN (H): Primary | ICD-10-CM

## 2020-09-15 DIAGNOSIS — Z79.4 TYPE 2 DIABETES MELLITUS WITH STAGE 3 CHRONIC KIDNEY DISEASE, WITH LONG-TERM CURRENT USE OF INSULIN (H): Primary | ICD-10-CM

## 2020-09-15 PROCEDURE — 99207 C COMMUNITY PARAMEDIC - PATIENT NOT BILLABLE: CPT

## 2020-09-15 ASSESSMENT — ACTIVITIES OF DAILY LIVING (ADL): DEPENDENT_IADLS:: SHOPPING

## 2020-09-15 NOTE — PROGRESS NOTES
Community Paramedics Follow-up Visit  September 15, 2020  TIME: 1230    Mariel Ribeiro is a 75 year old female being seen at home for a follow-up visit.    Present at appointment:  patient         Chief Complaint   Patient presents with     Outreach     New mass in breast       Universal Utilization:      Utilization    Last refreshed: 9/15/2020  9:40 AM:  Hospital Admissions 1           Last refreshed: 9/15/2020  9:40 AM:  ED Visits 3           Last refreshed: 9/15/2020  9:40 AM:  No Show Count (past year) 1              Current as of: 9/15/2020  9:40 AM              BP 94/42   Pulse 70   Temp 98.7  F (37.1  C)   Resp 16   Wt 145.6 kg (321 lb)   SpO2 95%   BMI 51.81 kg/m      Clinical Concerns:  Current Medical Concerns:  Back pain, new mass in breast    Current Behavioral Concerns: none    Education Provided to patient: na   Medication set up? No  Pill Box issued: No  Scale issued: No  Health Maintenance Reviewed:      Clinical Pathway: None    No  Face to Face in Home / Community    Recent blood sugars: AM: 151, 163, 162, 160, 163, 187, 174    Review of Symptoms/PE    Skin: rash  Eyes: negative  Ears/Nose/Throat: negative  Respiratory: Dyspnea on exertion-   Cardiovascular: dyspnea on exertion and lower extremity edema  Gastrointestinal: negative  Genitourinary: negative  Musculoskeletal: positive pt has a mass about the size of a golf ball in the at the bottom, center of her L breast  Neurologic: numbness and tingling of the hands,fingers  Psychiatric: negative    Pain Management::                 Plan:     Time spent with patient: 60    The patient meets one or more of the following criteria:  * Has been identified by their primary care provider at risk of nursing home placement    Acute concern/Follow-up recommendations: Continue tx plan    Next CP visit scheduled: 9/29/2020    Issues for Provider to follow up on: Recent BP:  96/43, 104/49, 110/48, 88/41,104/45, 89/40, 94/47, 105/45, The pt home BP for  the past week.  She has some concerns about this.  The pt is also inquiring if she would be able to get the Kaylah sensors covered by insurance due to the neuropathy in her hands which makes it very difficult to use her standard kit.    The pt reports a new marble size mass on center, bottom of her L breast.  Pt reports she noticed it about 2 months ago and reports that it seems to have grown rapidly since she noticed it.  She has not yet reported this mass until today and has an in person appointment with Oncology on 9/28/2020.    Provider follow up visit needed: Multiple upcoming

## 2020-09-16 ENCOUNTER — PATIENT OUTREACH (OUTPATIENT)
Dept: CARDIOLOGY | Facility: CLINIC | Age: 75
End: 2020-09-16

## 2020-09-16 ENCOUNTER — PATIENT OUTREACH (OUTPATIENT)
Dept: CARE COORDINATION | Facility: CLINIC | Age: 75
End: 2020-09-16

## 2020-09-16 DIAGNOSIS — N63.0 BREAST MASS: Primary | ICD-10-CM

## 2020-09-16 NOTE — PROGRESS NOTES
Clinic Care Coordination Contact  [facility]/Voicemail       Clinical Data: Care Coordinator Outreach. Received copy of community paramedic's home visit note. Patient reporting enlargement of breast mass that she noted 2 months ago but has not reported to medical team  Outreach attempted x 1.  Left message on patient's voicemail with call back information and requested return call.  Plan: Care Coordinator will try to reach patient again in 1-2 business days.    Vibha Carvajal RN  [facility] Primary Care-Care Coordination  [facility]  [facility]  [phone]

## 2020-09-16 NOTE — TELEPHONE ENCOUNTER
Message below in italics received from Community Paramedic. Called Mariel to follow up on low blood pressures and left voicemail. Routing to PCP for follow up on Kaylah sensors and breast mass.     Recent BP:  96/43, 104/49, 110/48, 88/41,104/45, 89/40, 94/47, 105/45, The pt home BP for the past week.  She has some concerns about this.  The pt is also inquiring if she would be able to get the Kaylah sensors covered by insurance due to the neuropathy in her hands which makes it very difficult to use her standard kit.     The pt reports a new marble size mass on center, bottom of her L breast.  Pt reports she noticed it about 2 months ago and reports that it seems to have grown rapidly since she noticed it.  She has not yet reported this mass until today and has an in person appointment with Oncology on 9/28/2020.    Rosina Mays RN

## 2020-09-17 ENCOUNTER — TELEPHONE (OUTPATIENT)
Dept: ONCOLOGY | Facility: CLINIC | Age: 75
End: 2020-09-17

## 2020-09-17 DIAGNOSIS — Z79.4 TYPE 2 DIABETES MELLITUS WITH STAGE 3 CHRONIC KIDNEY DISEASE, WITH LONG-TERM CURRENT USE OF INSULIN (H): ICD-10-CM

## 2020-09-17 DIAGNOSIS — E11.22 TYPE 2 DIABETES MELLITUS WITH STAGE 3 CHRONIC KIDNEY DISEASE, WITH LONG-TERM CURRENT USE OF INSULIN (H): ICD-10-CM

## 2020-09-17 DIAGNOSIS — D05.12 DUCTAL CARCINOMA IN SITU (DCIS) OF LEFT BREAST: Primary | ICD-10-CM

## 2020-09-17 DIAGNOSIS — N63.0 LUMP OR MASS IN BREAST: ICD-10-CM

## 2020-09-17 DIAGNOSIS — N18.30 TYPE 2 DIABETES MELLITUS WITH STAGE 3 CHRONIC KIDNEY DISEASE, WITH LONG-TERM CURRENT USE OF INSULIN (H): ICD-10-CM

## 2020-09-17 NOTE — TELEPHONE ENCOUNTER
Called Mariel back to give following verbal orders:  Date: 9/17/2020    Time of Call: 2:47 PM     Diagnosis:  HFpEF     VORB Ordering provider: Yuni SCHAEFER  Order: Decrease Toprol XL to 25 mg daily     Order received by: Rosina Mays RN      Follow-up/additional notes: Reviewed with Sandro verbalized understanding.

## 2020-09-17 NOTE — TELEPHONE ENCOUNTER
Called pt to discuss symptomatic vm left on CC phone. Pt stated 2 months ago she felt a new lump along lower, inner left breast separate from the one she felt last year. Stated it has grown to about marble size now and is tender to touch. Denied any outward skin changes, inflammation. Has chronic joint stiffness and denied new, acute pain by using left shoulder, arm or lifting, state she cannot lift left arm much but that is not new. Denied fevers or chills. Has a virtual visit with Dr. Weber 9/28 but request this be converted to in-person though. Is taking daily anastrozole. Paged Dr. Weber.    Per Dr. Weber: recommend bilateral diagnostic mammogram since pt is overdue and Left breast ultrasound, will enter orders. Ok to change 9/28 visit to in person. Pt informed she will be contacted by scheduling for imaging date/time, to call back if symptoms worsen before apt, she verbalized understanding.

## 2020-09-17 NOTE — TELEPHONE ENCOUNTER
Called Mariel back. Regarding her low blood pressures, I asked if she has any dizziness or lightheadedness with them and she reports she is always dizzy. I asked if it's any worse than normal and she said no.     We reviewed her cardiomems numbers which have all been in her goal range of 14-18.   She needs information on MRI compatibility for cardiomems and doesn't have the device card. I will contact our reps and follow up on getting her a device card sent out.   Rosina Mays RN

## 2020-09-18 DIAGNOSIS — E11.22 TYPE 2 DIABETES MELLITUS WITH STAGE 3 CHRONIC KIDNEY DISEASE, WITH LONG-TERM CURRENT USE OF INSULIN (H): ICD-10-CM

## 2020-09-18 DIAGNOSIS — N18.30 TYPE 2 DIABETES MELLITUS WITH STAGE 3 CHRONIC KIDNEY DISEASE, WITH LONG-TERM CURRENT USE OF INSULIN (H): ICD-10-CM

## 2020-09-18 DIAGNOSIS — Z79.4 TYPE 2 DIABETES MELLITUS WITH STAGE 3 CHRONIC KIDNEY DISEASE, WITH LONG-TERM CURRENT USE OF INSULIN (H): ICD-10-CM

## 2020-09-18 RX ORDER — FLASH GLUCOSE SENSOR
1 KIT MISCELLANEOUS
Qty: 6 EACH | Refills: 3 | Status: SHIPPED | OUTPATIENT
Start: 2020-09-18 | End: 2020-10-13

## 2020-09-18 NOTE — TELEPHONE ENCOUNTER
Refill request sent back for Continuous Blood Gluc Sensor (FREESTYLE MARTY 14 DAY SENSOR) MISC,  Pharmacy wants to know if this was supposed to be for some kind of insulin? Please verify them med orders vs. directions.  Thanks.

## 2020-09-21 RX ORDER — REPAGLINIDE 1 MG/1
1 TABLET ORAL
Qty: 90 TABLET | Refills: 3 | OUTPATIENT
Start: 2020-09-21

## 2020-09-21 NOTE — TELEPHONE ENCOUNTER
Call to [facility] pharmacy after receiving paper fax request for refill, message left of refill being declined at this time as needs to be seen in clinic, should have enough med to last until 12/20202  Last Clinic Visit: 12/3/19 with recommended 3-4 week follow up, no visits scheduled.

## 2020-09-22 ENCOUNTER — PATIENT OUTREACH (OUTPATIENT)
Dept: CARE COORDINATION | Facility: CLINIC | Age: 75
End: 2020-09-22

## 2020-09-22 ENCOUNTER — ANCILLARY PROCEDURE (OUTPATIENT)
Dept: MAMMOGRAPHY | Facility: CLINIC | Age: 75
End: 2020-09-22
Attending: INTERNAL MEDICINE
Payer: MEDICARE

## 2020-09-22 DIAGNOSIS — D05.12 DUCTAL CARCINOMA IN SITU (DCIS) OF LEFT BREAST: ICD-10-CM

## 2020-09-22 DIAGNOSIS — N63.0 LUMP OR MASS IN BREAST: ICD-10-CM

## 2020-09-22 ASSESSMENT — ACTIVITIES OF DAILY LIVING (ADL): DEPENDENT_IADLS:: SHOPPING

## 2020-09-22 NOTE — PROGRESS NOTES
Clinic Care Coordination Contact    Follow Up Progress Note      Assessment: received staff message from community paramedic that patient reported an enlarging breast mass to her at her last home visit. Patient noticed it several months ago but did not report this. She states the mass is enlarging. Patient with history of breast cancer. Reviewed with patient. She has contacted her oncologist and is scheduled today for breast mammogram and ultrasound. She has follow up scheduled with her oncologist for in-person visit 9-28 to discuss the results. She reported to me over the Summer that she was very hesitant to present to a clinic or hospital for fear of jt COVID-19 but she does plan on attending the appointments. Patient is noting worsening right hip. Reports she fell at home 1-2 months ago when her right leg gave out on her. Injured right shoulder during fall. Patient's roommate was able to assist her to a chair. Reports right shoulder pain radiating into jaw is also worsening. Was going to chiropractor to help with hip pain but she can no longer get up the stairs to the office. She is being careful walking up two steps into her home. Has falls detection phone system in place but notes the phone to be in her bedroom she acknowledges she would need to crawl to the phone if she fell in another part of her home. Her long-time roommate Odlais is almost always with her currently. She is having difficulty getting a call through to her clinic to schedule with her PCP and when she is able to the wait is 1 1/2 months at times. Considering changing providers. Suggested going to ortho walk-in clinic today for evaluation of shoulder and hip since she will be in the same building getting her imaging. She agrees this may be helpful.     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 I will continue to see my chiropractor for routine visits for acupuncture treatments which I feel may be helping my knee instability   2. I will consider wearing ACE wraps from my knee to thigh to see if this improves instability  3. I will call my PCP for plan if I am experiencing worsening to discuss plan              Medical (pt-stated)   50%     Goal Statement: I agree to see my oncologist for an in-person visit as discussed if I have any increase in size of my breast masses within the next 3-6 months  Date Goal set: 7-2-2020  Barriers: declines going to [facility] for visit due to fear of jt COVID-19 virus  Strengths: trusts her oncologist and will consider in-person visit if needed  Date to Achieve By: 1-2-2021  Patient expressed understanding of goal: yes  Action steps to achieve this goal:  1. I will check my breast masses for enlargement at interval recommended by my oncologist  2. I will contact my oncologist for an in-person appointment if I discover an enlargement in one or both of my breast masses  3. I will contact my oncologist if I still have concerns/fear of in-person visit if COVID-19 remains a pandemic concern to discuss a plan    9-: patient has new breast lump. Mammogram and breast ultrasound scheduled today. In person oncology appt scheduled 9-          COMPLETED: Monitoring (pt-stated)        2-Goal Statement: Goal Statement: I want to maintain my weight so that it meets the weight parameter set by my cardiologist and CORE clinic team for the next six months  Date Goal set: 5-8-2020  Barriers: at times forgets  to weigh herself or write it down  Strengths: states that her roommate can remind her  Date to Achieve By: 10/1/2020  Patient expressed understanding of goal: yes  Action steps to achieve this goal:  1. I will ask my roommate to remind me to weigh myself and record the weight daily  2. I will call my clinic or CORE clinic with a daily weight gain over 2 lbs in one day or 5 lbs in one week  3. I will update my cardiology team with my recorded weights at my appointments    5-4-2020: hasn't been able to weigh herself for two weeks but just got new batteries for her scale and she is able to weigh herself again        Monitoring (pt-stated)   50%     Goal Statement: I will get my Freestyle Kaylah to help with my BG.  Date Goal set: 9/15/2020  Barriers: Money  Strengths: Help of Community Paramedic  Date to Achieve By: 2 weeks  Patient expressed understanding of goal: yes  Action steps to achieve this goal:  1. I will message my PCP for a waiver  2. I will  follow up with my Pharmacy.    9-: pharmacy working with clinic on a question regarding the Freestyle Kaylah             Intervention/Education provided during outreach: recommend evaluation in walk in ortho clinic     Outreach Frequency: monthly    Plan:   Patient to call [facility] walk in ortho clinic to check for appointment (recommended due to COVID social distance requirements in clinic). Keep imaging as scheduled today. Send MyChart message to clinic team to discuss options for other PCP's at her clinic. Keep me updated on outcome of ortho appointment. Keep oncology appointment as scheduled. She agrees with plan.   Care Coordinator will follow up in 3-4 weeks.     Vibha Carvajal RN  [facility] Primary Care-Care Coordination  [facility]  [facility]  [phone]
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
## 2020-09-28 ENCOUNTER — PATIENT OUTREACH (OUTPATIENT)
Dept: CARDIOLOGY | Facility: CLINIC | Age: 75
End: 2020-09-28

## 2020-09-28 ENCOUNTER — VIRTUAL VISIT (OUTPATIENT)
Dept: ONCOLOGY | Facility: CLINIC | Age: 75
End: 2020-09-28
Attending: INTERNAL MEDICINE
Payer: MEDICARE

## 2020-09-28 ENCOUNTER — ALLIED HEALTH/NURSE VISIT (OUTPATIENT)
Dept: CARDIOLOGY | Facility: CLINIC | Age: 75
End: 2020-09-28
Attending: NURSE PRACTITIONER
Payer: MEDICARE

## 2020-09-28 DIAGNOSIS — I50.32 CHRONIC DIASTOLIC HEART FAILURE (H): Primary | ICD-10-CM

## 2020-09-28 DIAGNOSIS — I50.32 CHRONIC DIASTOLIC HEART FAILURE (H): Chronic | ICD-10-CM

## 2020-09-28 DIAGNOSIS — D05.12 DUCTAL CARCINOMA IN SITU (DCIS) OF LEFT BREAST: Primary | ICD-10-CM

## 2020-09-28 PROCEDURE — 99442 ZZC PHYSICIAN TELEPHONE EVALUATION 11-20 MIN: CPT | Mod: GC | Performed by: INTERNAL MEDICINE

## 2020-09-28 PROCEDURE — 93264 REM MNTR WRLS P-ART PRS SNR: CPT | Mod: ZP | Performed by: NURSE PRACTITIONER

## 2020-09-28 PROCEDURE — 40001009 ZZH VIDEO/TELEPHONE VISIT; NO CHARGE

## 2020-09-28 RX ORDER — BUMETANIDE 1 MG/1
TABLET ORAL
Qty: 280 TABLET | Refills: 3 | Status: SHIPPED | OUTPATIENT
Start: 2020-09-28 | End: 2021-09-21

## 2020-09-28 NOTE — TELEPHONE ENCOUNTER
[facility] - CardioMEMS Reading Review    September 28, 2020   CardioMEMS reviewed and routed to patient's provider, Yuni SCHAEFER.   Current diuretic: Bumex 4 mg daily  Recent plan of care changes: none    Current Threshold parameters:       Today's Waveform:       Readings:           RHC Date Wedge Pressure MEMS PAD during cath  (average of the 3 values)     7/1/2019 w/MEMS implant     20 mmHg   18 mmHg           Rosina Mays RN
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
## 2020-09-29 ENCOUNTER — ALLIED HEALTH/NURSE VISIT (OUTPATIENT)
Dept: BEHAVIORAL HEALTH | Facility: CLINIC | Age: 75
End: 2020-09-29
Payer: COMMERCIAL

## 2020-09-29 VITALS
TEMPERATURE: 97.6 F | OXYGEN SATURATION: 94 % | HEART RATE: 58 BPM | SYSTOLIC BLOOD PRESSURE: 123 MMHG | DIASTOLIC BLOOD PRESSURE: 46 MMHG | RESPIRATION RATE: 16 BRPM | BODY MASS INDEX: 52 KG/M2 | WEIGHT: 293 LBS

## 2020-09-29 DIAGNOSIS — Z79.4 TYPE 2 DIABETES MELLITUS WITH STAGE 3 CHRONIC KIDNEY DISEASE, WITH LONG-TERM CURRENT USE OF INSULIN (H): Primary | ICD-10-CM

## 2020-09-29 DIAGNOSIS — N18.30 TYPE 2 DIABETES MELLITUS WITH STAGE 3 CHRONIC KIDNEY DISEASE, WITH LONG-TERM CURRENT USE OF INSULIN (H): Primary | ICD-10-CM

## 2020-09-29 DIAGNOSIS — E11.22 TYPE 2 DIABETES MELLITUS WITH STAGE 3 CHRONIC KIDNEY DISEASE, WITH LONG-TERM CURRENT USE OF INSULIN (H): Primary | ICD-10-CM

## 2020-09-29 PROCEDURE — 99207 C COMMUNITY PARAMEDIC - PATIENT NOT BILLABLE: CPT

## 2020-09-29 NOTE — PROGRESS NOTES
[facility] Follow-up Visit  September 29, 2020  TIME: 1400    Mariel Ribeiro is a 75 year old female being seen at home for a follow-up visit.    Present at appointment:patient           Chief Complaint   Patient presents with     Outreach       [place] Utilization:      Utilization    Last refreshed: 9/29/2020 10:31 AM:  Hospital Admissions 1           Last refreshed: 9/29/2020 10:31 AM:  ED Visits 3           Last refreshed: 9/29/2020 10:31 AM:  No Show Count (past year) 1              Current as of: 9/29/2020 10:31 AM              /46   Pulse 58   Temp 97.6  F (36.4  C)   Resp 16   Wt 146.1 kg (322 lb 3.2 oz)   SpO2 94%   BMI 52.00 kg/m      Clinical Concerns:  Current Medical Concerns:  Breast cancer,Neuropathy in hands    Current Behavioral Concerns: Not willing to leave home for MD appointments due to Covid.  Education Provided to patient: No   Medication set up? No  Pill Box issued: No  Scale issued: No  Health Maintenance Reviewed:      Clinical Pathway: None    No  Face to Face in Home / Community    Recent blood sugars:  Fasting 149, 264, 192, 150, 150, 172, 153, 123, 149    Review of Symptoms/PE    Skin: rash  Eyes: glasses  Ears/Nose/Throat: negative  Respiratory: Dyspnea on exertion-   Cardiovascular: lower extremity edema  Gastrointestinal: negative  Genitourinary: Pt reports for about the past 2 months when she urinates she get a sensation that will travel up to her chest.  She states the best way to describe it is like when she had intercourse many years ago.  She reports it now happens every time she urinates.  Musculoskeletal: back pain  Neurologic: migraine headaches and numbness or tingling of hands  Psychiatric: negative    Pain Management::                 Plan:     Time spent with patient: 60    The patient meets one or more of the following criteria:  * Has received hospital emergency department services three or more times in four consecutive months within a twelve  month period    Acute concern/Follow-up recommendations: Continue tx plan    Next CP visit scheduled: 10/13/2020    Issues for Provider to follow up on: Recent BP: 110/48, 112/48. 106/51, 117/50, 128/53, 127/59, 117,55, 122/54, Unusual sensation with urination that radiates up from vagina to chest which pt was concerned might be a MI.  It has been going on for about 2 months.  Denies pain or foul smell but admits to yeast infection and rash in vaginal area that she feels like is not under control.    The pt is still having issues using her glucometer due to neuropathy in her hands.    Provider follow up visit needed: Multiple upcoming

## 2020-09-29 NOTE — Clinical Note
Pt reports being denies Freestyle sensors at cost from insurance and would like PCP to write an exception for the sensors due to neuropathy if possible.

## 2020-10-01 DIAGNOSIS — I50.32 CHRONIC DIASTOLIC HEART FAILURE (H): Chronic | ICD-10-CM

## 2020-10-01 PROCEDURE — 36415 COLL VENOUS BLD VENIPUNCTURE: CPT | Performed by: PHYSICIAN ASSISTANT

## 2020-10-01 PROCEDURE — 80048 BASIC METABOLIC PNL TOTAL CA: CPT | Performed by: PHYSICIAN ASSISTANT

## 2020-10-02 LAB
ANION GAP SERPL CALCULATED.3IONS-SCNC: 10 MMOL/L (ref 3–14)
BUN SERPL-MCNC: 31 MG/DL (ref 7–30)
CALCIUM SERPL-MCNC: 9.9 MG/DL (ref 8.5–10.1)
CHLORIDE SERPL-SCNC: 105 MMOL/L (ref 94–109)
CO2 SERPL-SCNC: 26 MMOL/L (ref 20–32)
CREAT SERPL-MCNC: 1.57 MG/DL (ref 0.52–1.04)
GFR SERPL CREATININE-BSD FRML MDRD: 32 ML/MIN/{1.73_M2}
GLUCOSE SERPL-MCNC: 113 MG/DL (ref 70–99)
POTASSIUM SERPL-SCNC: 3.9 MMOL/L (ref 3.4–5.3)
SODIUM SERPL-SCNC: 141 MMOL/L (ref 133–144)

## 2020-10-05 ENCOUNTER — TELEPHONE (OUTPATIENT)
Dept: ENDOCRINOLOGY | Facility: CLINIC | Age: 75
End: 2020-10-05

## 2020-10-05 ENCOUNTER — HOSPITAL ENCOUNTER (OUTPATIENT)
Dept: MRI IMAGING | Facility: CLINIC | Age: 75
Discharge: HOME | End: 2020-10-05
Attending: FAMILY MEDICINE | Admitting: FAMILY MEDICINE
Payer: MEDICARE

## 2020-10-05 ENCOUNTER — TELEPHONE (OUTPATIENT)
Dept: CARDIOLOGY | Facility: CLINIC | Age: 75
End: 2020-10-05

## 2020-10-05 DIAGNOSIS — M53.3 PAIN IN THE COCCYX: ICD-10-CM

## 2020-10-05 LAB — RADIOLOGIST FLAGS: ABNORMAL

## 2020-10-05 PROCEDURE — 72195 MRI PELVIS W/O DYE: CPT | Mod: 26 | Performed by: RADIOLOGY

## 2020-10-05 PROCEDURE — 72195 MRI PELVIS W/O DYE: CPT

## 2020-10-05 NOTE — TELEPHONE ENCOUNTER
Spoke with patient re: [place] F/U with Muriel Will pt stated she wanted to wait until we got results for the MRI She will call back to schdcule that F/U     Charissa Castellanos on 10/5/2020 at 11:50 AM

## 2020-10-05 NOTE — TELEPHONE ENCOUNTER
Health Call Center    Phone Message    May a detailed message be left on voicemail: yes     Reason for Call: Other: Mariel calling to request a call back as soon as possible.  She states she needs information regarding her MEM's machine as she has an MRI scheduled for 3pm today.  They are needing information about anything that may have been surgically implanted into her body.  Please call Mariel back as soon as possible to discuss     Action Taken: Message routed to:  [facility] ([facility]  Cardiology    Travel Screening: Not Applicable

## 2020-10-05 NOTE — TELEPHONE ENCOUNTER
Called Mariel back. She doesn't have her Cardiomems ID card. I called MRI directly and spoke with Brian over there and emailed him information on Cardiomems MRI compatibility and let him know she doesn't have her card. I asked him to call Mariel if any issues but he thought it would be ok. Called Mariel back to let her know this, also given phone number for [employer] tech support to call and request new ID card be mailed out.   Rosina Mays RN

## 2020-10-07 DIAGNOSIS — I50.32 CHRONIC DIASTOLIC HEART FAILURE (H): Chronic | ICD-10-CM

## 2020-10-09 ENCOUNTER — VIRTUAL VISIT (OUTPATIENT)
Dept: FAMILY MEDICINE | Facility: CLINIC | Age: 75
End: 2020-10-09
Payer: MEDICARE

## 2020-10-09 VITALS — WEIGHT: 293 LBS | BODY MASS INDEX: 51.49 KG/M2

## 2020-10-09 DIAGNOSIS — M53.3 PAIN IN THE COCCYX: Primary | ICD-10-CM

## 2020-10-09 DIAGNOSIS — E66.01 MORBID OBESITY (H): Chronic | ICD-10-CM

## 2020-10-09 PROCEDURE — 99443 PR PHYSICIAN TELEPHONE EVALUATION 21-30 MIN: CPT | Mod: 95 | Performed by: FAMILY MEDICINE

## 2020-10-09 ASSESSMENT — PATIENT HEALTH QUESTIONNAIRE - PHQ9: SUM OF ALL RESPONSES TO PHQ QUESTIONS 1-9: 4

## 2020-10-09 NOTE — PROGRESS NOTES
[facility] - CardioMEMS Reading Review    October 9, 2020   CardioMEMS reviewed.   Current diuretic: Bumex 4 mg daily  Recent plan of care changes: Last week increased to 4 mg/2mg for 3 days.     Current Threshold parameters:       Today's Waveform:       Readings:           RHC Date Wedge Pressure MEMS PAD during cath  (average of the 3 values)     7/1/2019 w/MEMS implant     20 mmHg   18 mmHg           Rosina Mays RN

## 2020-10-09 NOTE — PROGRESS NOTES
"Mariel Ribeiro is a 75 year old female who is being evaluated via a billable telephone visit.      The patient has been notified of following:     \"This telephone visit will be conducted via a call between you and your physician/provider. We have found that certain health care needs can be provided without the need for a physical exam.  This service lets us provide the care you need with a short phone conversation.  If a prescription is necessary we can send it directly to your pharmacy.  If lab work is needed we can place an order for that and you can then stop by our lab to have the test done at a later time.    Telephone visits are billed at different rates depending on your insurance coverage. During this emergency period, for some insurers they may be billed the same as an in-person visit.  Please reach out to your insurance provider with any questions.    If during the course of the call the physician/provider feels a telephone visit is not appropriate, you will not be charged for this service.\"    Patient has given verbal consent for Telephone visit?  Yes    What phone number would you like to be contacted at? [phone]    How would you like to obtain your AVS? MyChart    Subjective     Mariel Ribeiro is a 75 year old female who presents via phone visit today for the following health issues:    HPI     Patient has telephone visit  to go over MRI result.   Increased tailbone pain for months.  Fell 3 months ago.    Patient fell 3 months ago onto tailbone and has had pain ever since.  Has been getting adjustments at chiropractor- pain worsening.  Chiropractor recommended MRI before continuing further manipulation to area.  Patient called for MRI order- I have not evaluated this pain in person.  Patient denies any skin breakdown or overlying redness or drainage.  Pain is localized to tailbone.  Tried multiple different donut pillows to offload pressure on coccyx- none worked.  Uses specific chair in house to " sit.  Leans forward when sitting playing computer games to take mind off pain and positioning helps offload per patient and roommate.  No fever or chills.    Multiple comorbidities- + morbidly obese.    Patient unable to use NSAIDs with CKD.  Has Tramadol for pain but does not want to get addicted to this so has been using it sparingly.  Would like to consider injection.      Review of Systems   Constitutional, HEENT, cardiovascular, pulmonary, GI, , musculoskeletal, neuro, skin, endocrine and psych systems are negative, except as otherwise noted.       Objective          Vitals:  No vitals were obtained today due to virtual visit.    healthy, alert and no distress  PSYCH: Alert and oriented times 3; coherent speech, normal   rate and volume, able to articulate logical thoughts, able   to abstract reason, no tangential thoughts, no hallucinations   or delusions  Her affect is normal and pleasant  RESP: No cough, no audible wheezing, able to talk in full sentences  Remainder of exam unable to be completed due to telephone visits          Assessment/Plan:    Assessment & Plan     Pain in the coccyx    - PAIN MANAGEMENT REFERRAL; Future    MRI reviewed with patient.  Rads did call with concern for possible osteomyelitis vs trauma.  No concern for osteomyelitis with patient hx of trauma to area and no area of skin breakdown- but this is discussed with patient as part of differential on MRI.  Patient continues to try to offload any pressure on coccyx.  Recommend ICE to area- although patient states this is hard for her.  Referred to PAIN CLINIC to see if injection would be possible.  If not- may consider Medrol dosepak prn.    Morbid obesity (H)    Body habitus exacerbating pain relief and offloading of pressure to this site.    Amee Connell MD  [facility]    Phone call duration:  22 minutes

## 2020-10-10 RX ORDER — POTASSIUM CHLORIDE 750 MG/1
20 TABLET, EXTENDED RELEASE ORAL 2 TIMES DAILY
Qty: 120 TABLET | Refills: 8 | Status: SHIPPED | OUTPATIENT
Start: 2020-10-10 | End: 2021-11-09

## 2020-10-10 NOTE — TELEPHONE ENCOUNTER
potassium chloride ER (KLOR-CON M) 10 MEQ CR tablet  Last Written Prescription Date:  6/17/20  Last Fill Quantity: 120,   # refills: 4  Last Office Visit : 6/17/20  Future Office visit:  11/25/20

## 2020-10-13 ENCOUNTER — TELEPHONE (OUTPATIENT)
Dept: FAMILY MEDICINE | Facility: CLINIC | Age: 75
End: 2020-10-13

## 2020-10-13 ENCOUNTER — PATIENT OUTREACH (OUTPATIENT)
Dept: CARE COORDINATION | Facility: CLINIC | Age: 75
End: 2020-10-13

## 2020-10-13 DIAGNOSIS — E11.22 TYPE 2 DIABETES MELLITUS WITH STAGE 3 CHRONIC KIDNEY DISEASE, WITH LONG-TERM CURRENT USE OF INSULIN (H): ICD-10-CM

## 2020-10-13 DIAGNOSIS — N18.31 TYPE 2 DIABETES MELLITUS WITH STAGE 3A CHRONIC KIDNEY DISEASE, WITH LONG-TERM CURRENT USE OF INSULIN (H): Primary | ICD-10-CM

## 2020-10-13 DIAGNOSIS — M54.50 LOWER BACK PAIN: Primary | ICD-10-CM

## 2020-10-13 DIAGNOSIS — Z79.4 TYPE 2 DIABETES MELLITUS WITH STAGE 3A CHRONIC KIDNEY DISEASE, WITH LONG-TERM CURRENT USE OF INSULIN (H): Primary | ICD-10-CM

## 2020-10-13 DIAGNOSIS — N18.30 TYPE 2 DIABETES MELLITUS WITH STAGE 3 CHRONIC KIDNEY DISEASE, WITH LONG-TERM CURRENT USE OF INSULIN (H): ICD-10-CM

## 2020-10-13 DIAGNOSIS — E11.22 TYPE 2 DIABETES MELLITUS WITH STAGE 3A CHRONIC KIDNEY DISEASE, WITH LONG-TERM CURRENT USE OF INSULIN (H): Primary | ICD-10-CM

## 2020-10-13 DIAGNOSIS — Z79.4 TYPE 2 DIABETES MELLITUS WITH STAGE 3 CHRONIC KIDNEY DISEASE, WITH LONG-TERM CURRENT USE OF INSULIN (H): ICD-10-CM

## 2020-10-13 NOTE — TELEPHONE ENCOUNTER
Dr. Connell-Please review and sign if agree.    Phone call received from [facility] regarding sig for Freestyle Kaylah Sensor.    Pharmacist states directions appear to be for insulin (1 unit four times daily before meals and nightly) and asks for updated order to be sent.    Order pended with sig to be completed by provider.    Thank you!  SONIA RobertsN, RN

## 2020-10-14 DIAGNOSIS — Z79.4 TYPE 2 DIABETES MELLITUS WITH HYPERGLYCEMIA, WITH LONG-TERM CURRENT USE OF INSULIN (H): ICD-10-CM

## 2020-10-14 DIAGNOSIS — E11.65 TYPE 2 DIABETES MELLITUS WITH HYPERGLYCEMIA, WITH LONG-TERM CURRENT USE OF INSULIN (H): ICD-10-CM

## 2020-10-14 DIAGNOSIS — Z79.4 TYPE 2 DIABETES MELLITUS WITH HYPERGLYCEMIA, WITH LONG-TERM CURRENT USE OF INSULIN (H): Primary | ICD-10-CM

## 2020-10-14 DIAGNOSIS — E11.22 TYPE 2 DIABETES MELLITUS WITH STAGE 3 CHRONIC KIDNEY DISEASE, WITH LONG-TERM CURRENT USE OF INSULIN (H): ICD-10-CM

## 2020-10-14 DIAGNOSIS — E11.65 TYPE 2 DIABETES MELLITUS WITH HYPERGLYCEMIA, WITH LONG-TERM CURRENT USE OF INSULIN (H): Primary | ICD-10-CM

## 2020-10-14 DIAGNOSIS — Z79.4 TYPE 2 DIABETES MELLITUS WITH STAGE 3 CHRONIC KIDNEY DISEASE, WITH LONG-TERM CURRENT USE OF INSULIN (H): ICD-10-CM

## 2020-10-14 DIAGNOSIS — N18.30 TYPE 2 DIABETES MELLITUS WITH STAGE 3 CHRONIC KIDNEY DISEASE, WITH LONG-TERM CURRENT USE OF INSULIN (H): ICD-10-CM

## 2020-10-14 RX ORDER — FLASH GLUCOSE SENSOR
KIT MISCELLANEOUS
Qty: 6 EACH | Refills: 3 | Status: SHIPPED | OUTPATIENT
Start: 2020-10-14 | End: 2020-12-09

## 2020-10-14 NOTE — TELEPHONE ENCOUNTER
Long Acting Insulin Protocol Pxtqdq79/14/2020 07:50 AM   Recent (6 mo) or future (30 days) visit within the authorizing provider's specialty    Sent to on-call.  Shira Cruz, RN on 10/14/2020 at 8:49 AM        Conversation: Appointment  (Newest Message First)  Charissa Castellanos         10/5/20 11:50 AM  Note     Spoke with patient re: [place] F/U with Muriel Will pt stated she wanted to wait until we got results for the MRI She will call back to [facility][1] that F/U      Charissa Castellanos on 10/5/2020 at 11:50 AM

## 2020-10-14 NOTE — TELEPHONE ENCOUNTER
insulin glargine (LANTUS SOLOSTAR) 100 UNIT/ML pen        Last Written Prescription Date:  07/15/20  Last Fill Quantity: 15mL,   # refills: 1  Last Office Visit : 12/03/19  Future Office visit:  None scheduled    Routing refill request to provider for review/approval because:  Insulin - refilled per clinic           normal...

## 2020-10-15 NOTE — PROGRESS NOTES
Clinic Care Coordination Contact  Care Team Conversations    Received staff message from community paramedic asking for assistance for patient. She had virtual visit with PCP to discuss coccyx MRI. She is noted to have abnormality. Per PCP notes plan was to send patient to pain clinic to see if she was eligible for cortisone injections. Per CP patient cannot get into pain clinic until 11/5 and she wonders if I have the ability to move the appointment up. Responded to CP that I cannot move appointment up and that I am in the process of changing roles in my department but that I would direct her message to new RN Mona SCHMID. She agrees with plan.     Vibha Carvajal RN  [facility] Primary Care-Care Coordination  [facility]Integrated Primary [facility]  [facility]  [phone]

## 2020-10-15 NOTE — PROGRESS NOTES
"[facility] - CardioMEMS Reading Review    October 15, 2020   CardioMEMS reviewed  Current diuretic: Bumex 4mg daily  Recent plan of care changes: none this week.  PAD has remained within threshold of 14-18.    Current Threshold parameters:       Today's Waveform:       Readings:           RHC Date Wedge Pressure MEMS PAD during cath  (average of the 3 values)     2019 w/MEMS implant     20 mmHg   18 mmHg           Serena Streeter RN BSN CHFN  Cardiology Care Coordinator - C[facility]R.E. [facility]  Questions and [phone]  First press #1 for the [place] and then press #4 for \"Your Care Team\" to reach us Cardiology Nurses.                   "

## 2020-10-15 NOTE — PROGRESS NOTES
"Clinic Care Coordination Contact    Follow Up Progress Note      Assessment: outreach call to patient with friend, Odalis, on speaker phone. Patient having tremendous back pain and is \"miserable\". She is not following icing instructions as recommended by PCP. These are reviewed and she agrees to try these. Let them know of my transition and thanked patient for working with me. Introduced new RN CC and let her know she will be outreaching to her for further discussion.    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 I will continue to see my chiropractor for routine visits for acupuncture treatments which I feel may be helping my knee instability   2. I will consider wearing ACE wraps from my knee to thigh to see if this improves instability  3. I will call my PCP for plan if I am experiencing worsening to discuss plan  10-: goal not discussed. Patient having severe back pain              COMPLETED: Medical (pt-stated)   100%     Goal Statement: I agree to see my oncologist for an in-person visit as discussed if I have any increase in size of my breast masses within the next 3-6 months  Date Goal set: 7-2-2020  Barriers: declines going to [facility] for visit due to fear of jt COVID-19 virus  Strengths: trusts her oncologist and will consider in-person visit if needed  Date to Achieve By: 1-2-2021  Patient expressed understanding of goal: yes  Action steps to achieve this goal:  1. I will check my breast " masses for enlargement at interval recommended by my oncologist  2. I will contact my oncologist for an in-person appointment if I discover an enlargement in one or both of my breast masses  3. I will contact my oncologist if I still have concerns/fear of in-person visit if COVID-19 remains a pandemic concern to discuss a plan    9-: patient has new breast lump. Mammogram and breast ultrasound scheduled today. In person oncology appt scheduled 9-  Goal completed. Patient attended appointment               Intervention/Education provided during outreach: home treatment measures for back/coccyx pain reviewed          Plan:   RN CC will provide handoff to new RN CC and also community paramedic Patient to consider Pain Clinic in [place] as alternative location to see if she can get a sooner appointment.    Vibha Carvajal RN  [facility] Primary Care-Care Coordination  [facility]  [facility]  [phone]

## 2020-10-19 ENCOUNTER — TELEPHONE (OUTPATIENT)
Dept: FAMILY MEDICINE | Facility: CLINIC | Age: 75
End: 2020-10-19

## 2020-10-19 DIAGNOSIS — M53.3 PAIN IN THE COCCYX: Primary | ICD-10-CM

## 2020-10-19 RX ORDER — METHYLPREDNISOLONE 4 MG
TABLET, DOSE PACK ORAL
Qty: 21 TABLET | Refills: 0 | Status: SHIPPED | OUTPATIENT
Start: 2020-10-19 | End: 2021-01-08

## 2020-10-19 NOTE — TELEPHONE ENCOUNTER
Dr. Connell-  1. May patient have virtual/eVisit to address alternative medication request?   A. Unable to get appt with Pain Clinic until 11/5/20  2. It is writer's understanding walk-in Orthopedic clinics do not offer same day injection appts    Thank you!  CHEN Rivers, SONIAN, RN

## 2020-10-19 NOTE — TELEPHONE ENCOUNTER
Patient called stating she had an MRI & Dr Connell referred to pain clinic however, patient unable to get in until 11/5/20     Patient is in extreme pain and the Tramadol is not helping fully so, asking what can do in the meantime?    Patient is asking if she should go to the walk in Ortho and would they be able to give an Coccyx shot?    Patient asking if Prednisone might help with the swelling and pain or another pain med?    Please advise

## 2020-10-19 NOTE — TELEPHONE ENCOUNTER
Writer called patient, and was put on speaker phone with both patient and roommate, Odalis.    Writer reviewed message/plan per Dr. Connell, along with to which pharmacy medication sent.    Patient verbalized understanding and in agreement with plan.    Patient asked about an earlier appt with pain clinic and writer recommended following up with the pain clinic to ask if there is a cancellation list.  Odalis then asked if initial pain clinuc appt would administer injection and writer again recommended following up with pain clinic regarding these questions.    CHEN Rivers, SONIAN, RN

## 2020-10-19 NOTE — TELEPHONE ENCOUNTER
Sent in rx for Medrol dosepak which should help the pain and inflammation and help the Tramadol work better.

## 2020-10-21 ENCOUNTER — TELEPHONE (OUTPATIENT)
Dept: ANESTHESIOLOGY | Facility: CLINIC | Age: 75
End: 2020-10-21

## 2020-10-21 NOTE — TELEPHONE ENCOUNTER
Health Call Center    Phone Message    May a detailed message be left on voicemail: yes     Reason for Call: Other: Call Back: Patient states she is a new patient and would like a call back with more information about her appointment with Dr Porras on 11/04/2020. Please review and call patient back to advise.     Action Taken: Message routed to:  [facility] PAIN    Travel Screening: Not Applicable

## 2020-10-22 NOTE — PROGRESS NOTES
[facility] - CardioMEMS Reading Review    October 22, 2020   CardioMEMS reviewed.  Current diuretic: Bumex 4mg daily  Recent plan of care changes: none this week.  PAD has remained within threshold of 14-18.    Current Threshold parameters:        Today's Waveform:       Readings:           RHC Date Wedge Pressure MEMS PAD during cath  (average of the 3 values)     7/1/2019 w/MEMS implant     20 mmHg   18 mmHg           Rosina Mays RN

## 2020-10-22 NOTE — TELEPHONE ENCOUNTER
I called and spoke with the pt and informed her that Dr. Porras and her would discuss options of how to best treat her pain. I informed the pt that if an injection is recommended it would not be done the same day.    Pt stated verbal understanding and had no further questions or concerns.    Marry Tovar CMA

## 2020-10-27 ENCOUNTER — ALLIED HEALTH/NURSE VISIT (OUTPATIENT)
Dept: BEHAVIORAL HEALTH | Facility: CLINIC | Age: 75
End: 2020-10-27
Payer: COMMERCIAL

## 2020-10-27 VITALS
OXYGEN SATURATION: 97 % | TEMPERATURE: 97.3 F | HEART RATE: 60 BPM | SYSTOLIC BLOOD PRESSURE: 128 MMHG | DIASTOLIC BLOOD PRESSURE: 72 MMHG

## 2020-10-27 DIAGNOSIS — M54.50 BILATERAL LOW BACK PAIN, UNSPECIFIED CHRONICITY, UNSPECIFIED WHETHER SCIATICA PRESENT: Primary | ICD-10-CM

## 2020-10-27 PROCEDURE — 99207 PR COMMUNITY PARAMEDIC - PATIENT NOT BILLABLE: CPT

## 2020-10-27 ASSESSMENT — ACTIVITIES OF DAILY LIVING (ADL): DEPENDENT_IADLS:: SHOPPING

## 2020-10-27 NOTE — PROGRESS NOTES
Community Paramedics Follow-up Visit  2020  TIME: 1400    Mariel Ribeiro is a 75 year old female being seen at home for a follow-up visit.    Present at appointment:           Chief Complaint   Patient presents with     Outreach       [place] Utilization:      Utilization    Last refreshed: 10/27/2020 12:57 PM: Hospital Admissions 1           Last refreshed: 10/27/2020 12:57 PM: ED Visits 2           Last refreshed: 10/27/2020  1:25 AM: No Show Count (past year) 1              Current as of: 10/27/2020  1:25 AM              /72   Pulse 60   Temp 97.3  F (36.3  C)   SpO2 97%     Clinical Concerns:  Current Medical Concerns:  Back pain    Current Behavioral Concerns: NA    Education Provided to patient: Yes , placement of Freestyle sensor.    Medication set up? No  Pill Box issued: No  Scale issued: No  Health Maintenance Reviewed:      Clinical Pathway: None    No  Face to Face in Home / Community    Recent blood sugars: AM fastin, 190, 272, 193, 189, 170, 196, 135, 125, 151 Pt used Medrol pack last week.    Review of Symptoms/PE    Skin: negative  Eyes: glasses  Ears/Nose/Throat: negative  Respiratory: No shortness of breath, dyspnea on exertion, cough, or hemoptysis  Cardiovascular: negative  Gastrointestinal: negative  Genitourinary: negative  Musculoskeletal: back pain  Neurologic: numbness or tingling of hands  Psychiatric: negative    Pain Management::                 Plan:     Time spent with patient: 60    The patient meets one or more of the following criteria:  * Has been identified by their primary care provider at risk of nursing home placement    Acute concern/Follow-up recommendations: Continue tx plan    Next CP visit scheduled: 11/10/2020    Issues for Provider to follow up on: The pt had not yet placed the Freestyle sensor because she thought she needed to remove her arm from her clothing to do so.  I advised her that she is able to use with most clothing. The sensor was  then placed with assistance from the Community Paramedic.  The pt still has tailbone pain and has had a little relief after the Medrol Dosepak and Tramadol.    Provider follow up visit needed: Multiple upcoming

## 2020-10-28 ENCOUNTER — ALLIED HEALTH/NURSE VISIT (OUTPATIENT)
Dept: CARDIOLOGY | Facility: CLINIC | Age: 75
End: 2020-10-28
Attending: NURSE PRACTITIONER
Payer: MEDICARE

## 2020-10-28 DIAGNOSIS — I50.32 CHRONIC DIASTOLIC HEART FAILURE (H): Primary | ICD-10-CM

## 2020-10-28 PROCEDURE — 93264 REM MNTR WRLS P-ART PRS SNR: CPT | Performed by: NURSE PRACTITIONER

## 2020-10-28 NOTE — PROGRESS NOTES
[facility] - CardioMEMS Reading Review    October 28, 2020   CardioMEMS reviewed and routed to Austin Miguel NP  Current diuretic:  Bumex 4 mg daily  Recent plan of care changes: none. Called Mariel and reviewed her cardiomems readings. Was low for couple days this week, back in normal range today. She expressed frustration with her back pain, has follow up next week in pain management for this. Provided reassurance and active listening. Will continue to monitor cardiomems.    Current Threshold parameters:     14 - 18 pad    Today's Waveform:       Readings:                 Rosina Mays RN

## 2020-10-29 NOTE — PROGRESS NOTES
Remote monitoring of Pulmonary Artery Pressures via CardioMEMS device reviewed at least weekly and as needed with CORE nursing. Diuretics adjusted accordingly.      30 days of monitoring morena CORNEJO

## 2020-10-30 ENCOUNTER — PATIENT OUTREACH (OUTPATIENT)
Dept: BEHAVIORAL HEALTH | Facility: CLINIC | Age: 75
End: 2020-10-30

## 2020-10-30 NOTE — PROGRESS NOTES
The pt recently had the Freestyle Kaylah sensor placed.  She is concerned about getting a reading of 60.  She denies any symptoms with the reading and was advised to take her BG with her original glucometer.  The pt reports wanting a referral for a DME.

## 2020-11-04 ENCOUNTER — OFFICE VISIT (OUTPATIENT)
Dept: ANESTHESIOLOGY | Facility: CLINIC | Age: 75
End: 2020-11-04
Attending: FAMILY MEDICINE
Payer: MEDICARE

## 2020-11-04 VITALS
BODY MASS INDEX: 51.49 KG/M2 | HEIGHT: 66 IN | HEART RATE: 60 BPM | DIASTOLIC BLOOD PRESSURE: 70 MMHG | SYSTOLIC BLOOD PRESSURE: 138 MMHG | RESPIRATION RATE: 16 BRPM

## 2020-11-04 DIAGNOSIS — M53.3 PAIN IN THE COCCYX: ICD-10-CM

## 2020-11-04 PROCEDURE — 99203 OFFICE O/P NEW LOW 30 MIN: CPT

## 2020-11-04 ASSESSMENT — PAIN SCALES - GENERAL: PAINLEVEL: EXTREME PAIN (9)

## 2020-11-04 NOTE — NURSING NOTE
AVS reviewed with pt and given copy.  Pre-procedure instructions reviewed, including NPO orders,  needed, and medications to hold.  Pt verbalized understanding and declined having questions at this time.     SONIA CuelloN, RN
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
## 2020-11-04 NOTE — PATIENT INSTRUCTIONS
Procedures:    Call to schedule your procedure: [phone], option #2  Ganglion of impar block     Your pre-procedure instructions are below, please call our clinic if you have any questions.      Recommended Follow up:      4 to 6 weeks after the procedure        Please call [phone], option #1 to schedule your clinic appointment if you don't already have an appointment scheduled.        To speak with a nurse, schedule/reschedule/cancel a clinic appointment, or request a medication refill call: [phone], option #1.    You can also reach us by [employer]: [email or web address]        Procedure Information related to COVID-19    Please call [phone] option #2 to schedule, reschedule, or cancel your procedure appointment.   Phones are answered Monday - Friday from 08:00 - 4:30pm.  Leave a voicemail with your name, birth date, and phone number if no one is available to take your call.     You will need to be tested for COVID-19 within 4 days (96 hours) of your procedure.  You will be called to schedule your COVID test by a central scheduling team. If you have not been contacted to schedule your test within 4 days of the scheduled procedure, call [phone]    Please be aware that the turn around time for the test is approximately 24-72 hours.   If your results are still pending the day of your procedure, you will be notified as soon as possible as the procedure may be cancelled.    Please note: You will only be contacted for positive and pending results.       You may bring one visitor with you on the day of your procedure.  The procedure center staff will call you several days before the procedure to review important information that you will need to know for the day of the procedure.   Please contact the clinic if you have further questions about this information.       Information related to Scheduling and Pre-Procedure Instructions:      After calling to schedule your procedure  appointment, please contact the clinic to make your follow up clinic appointment 4 to 6 weeks after the procedure.  Clinic phone- [phone]        If you must reschedule your procedure more than two times, you must follow up in clinic before rescheduling again.        Preparing for your procedure    CAUTION - FAILURE TO FOLLOW THESE PRE-PROCEDURE INSTRUCTIONS WILL RESULT IN YOUR PROCEDURE BEING RESCHEDULED.      Your procedure: Ganglion of impar block             You must have a  take you home after your procedure. Transportation by taxi or para-transit is okay as long as you have a responsible adult accompany you. You must provide your 's full name and contact number at time of check in.     Fasting Protocol Please have nothing to eat and drink for 2 hours before the procedure.       You may have CLEAR LIQUIDS UP TO 2 HOURS prior to arrival.    Broth and candy are considered solid food and require an eight hour fast.     Clear liquids include water, clear fruit juice (no pulp), carbonated beverages, ice, black coffee, black tea, clear jello. No alcohol containing beverages.   Medications If you take any medications, DO NOT STOP. Take your medications as usual the day of your procedure with a sip of water AT LEAST 2 HOURS PRIOR TO ARRIVAL.    Antibiotics If you are currently taking antibiotics, you must complete the entire dose 7 days prior to your scheduled procedure. You must be clear of any signs or symptoms of infection. If you begin antibiotics, please contact our clinic for instructions.     Fever, Chills, or Rash If you experience a fever of higher than 100 degrees, chills, rash, or open wounds during the one week before your procedure, please call the clinic to see if you may proceed with your procedure.      Medication Hold List  **Patients under Cardiology/Neurology care should consult their provider prior to the pain procedure to verify pre-procedure medication instructions. The  information below contains general guidelines.**      Blood Thinners If you are taking daily ASPIRIN, PLAVIX, OR OTHER BLOOD THINNERS SUCH AS COUMADIN/WARFARIN, we will need your prescribing doctor to sign a release permitting you to stop these medications. Once approved by your prescribing doctor - STOP ALL BLOOD THINNERS BASED ON THE TIME TABLE BELOW PRIOR TO YOUR PROCEDURE. If you have been instructed to stop WARFARIN(COUMADIN), you must have an INR lab drawn the day before your procedure. . Your INR must be within normal limits before we can perform your injection. MEDICATIONS CAN BE RESTARTED AFTER YOUR PROCEDURE.    14 DAY HOLD  Ticlid (ticlopidine)    10 DAY HOLD  Effient (Prasugel)    3 DAY HOLD  Xarelto (rivaroxaban) 7 DAY HOLD  Anacin, Bufferin, Ecotrin, Excedrin, Aggrenox (Aspirin)  Brilinta (ticagrelor)  Coumadin (Warfarin)  Pradexa (Dabigatran)  Elmiron (Pentosan)  Plavix (Clopidogrel Bisulfate)  Pletal (Cilostazol)    24 HOUR HOLD  Lovenox (enoxaparin)  Agrylin (Anagrelide)                To speak with a nurse, schedule/reschedule/cancel a clinic appointment, or request a medication refill call: [phone]     You can also reach us by [employer]: [email or web address]

## 2020-11-05 DIAGNOSIS — E11.22 TYPE 2 DIABETES MELLITUS WITH STAGE 3A CHRONIC KIDNEY DISEASE, WITH LONG-TERM CURRENT USE OF INSULIN (H): Primary | ICD-10-CM

## 2020-11-05 DIAGNOSIS — Z79.4 TYPE 2 DIABETES MELLITUS WITH STAGE 3A CHRONIC KIDNEY DISEASE, WITH LONG-TERM CURRENT USE OF INSULIN (H): Primary | ICD-10-CM

## 2020-11-05 DIAGNOSIS — N18.31 TYPE 2 DIABETES MELLITUS WITH STAGE 3A CHRONIC KIDNEY DISEASE, WITH LONG-TERM CURRENT USE OF INSULIN (H): Primary | ICD-10-CM

## 2020-11-05 NOTE — PROGRESS NOTES
[facility] CORE Clinic - CardioMEMS Reading Review    November 5, 2020   CardioMEMS reviewed.   Current diuretic: Bumex 4 mg daily  Recent plan of care changes: none    Current Threshold parameters:       Today's Waveform:     Readings:           RHC Date Wedge Pressure MEMS PAD during cath  (average of the 3 values)     7/1/2019 w/MEMS implant     20 mmHg   18 mmHg           Rosina Mays RN

## 2020-11-09 DIAGNOSIS — Z11.59 ENCOUNTER FOR SCREENING FOR OTHER VIRAL DISEASES: Primary | ICD-10-CM

## 2020-11-10 ENCOUNTER — ALLIED HEALTH/NURSE VISIT (OUTPATIENT)
Dept: BEHAVIORAL HEALTH | Facility: CLINIC | Age: 75
End: 2020-11-10
Payer: COMMERCIAL

## 2020-11-10 VITALS
TEMPERATURE: 97.6 F | DIASTOLIC BLOOD PRESSURE: 54 MMHG | OXYGEN SATURATION: 96 % | SYSTOLIC BLOOD PRESSURE: 112 MMHG | HEART RATE: 61 BPM

## 2020-11-10 DIAGNOSIS — M54.50 BILATERAL LOW BACK PAIN, UNSPECIFIED CHRONICITY, UNSPECIFIED WHETHER SCIATICA PRESENT: Primary | ICD-10-CM

## 2020-11-10 PROCEDURE — 99207 PR COMMUNITY PARAMEDIC - PATIENT NOT BILLABLE: CPT

## 2020-11-10 ASSESSMENT — ACTIVITIES OF DAILY LIVING (ADL): DEPENDENT_IADLS:: SHOPPING

## 2020-11-10 NOTE — PROGRESS NOTES
[facility] Follow-up Visit  November 10, 2020  TIME: 1300    Mariel Ribeiro is a 75 year old female being seen at home for a follow-up visit.    Present at appointment: patient          Chief Complaint   Patient presents with     Outreach       [place] Utilization:      Utilization    Last refreshed: 11/10/2020  6:06 AM: Hospital Admissions 1           Last refreshed: 11/10/2020  6:06 AM: ED Visits 2           Last refreshed: 11/10/2020  6:06 AM: No Show Count (past year) 1              Current as of: 11/10/2020  6:06 AM              /54   Pulse 61   Temp 97.6  F (36.4  C)   SpO2 96%     Clinical Concerns:  Current Medical Concerns:  coccyx pain    Current Behavioral Concerns: NA    Education Provided to patient: Yes, phone number for DE,  Placement of Freestyle Kaylah  Medication set up? No  Pill Box issued: No  Scale issued: No  Health Maintenance Reviewed:      Clinical Pathway: None    No  Face to Face in Home / Community    Recent blood sugars: Average   11/10 1306 171  11/10 1003 151  11/10 0950 149  11/9 1131 188  11/09 1704 155  11/9 1427 170  11/9 1230 147  11/8 2325 218  1946 136  11/1809 141  11/8 1634 178  11/8 1446 223    Review of Symptoms/PE    Skin: negative  Eyes: glasses  Ears/Nose/Throat: negative  Respiratory: No shortness of breath, dyspnea on exertion, cough, or hemoptysis  Cardiovascular: negative  Gastrointestinal: negative  Genitourinary: negative  Musculoskeletal: back pain  Neurologic: numbness or tingling of hands and numbness or tingling of feet  Psychiatric: negative    Pain Management::                 Plan:     Time spent with patient: 60    The patient meets one or more of the following criteria:  * Has been identified by their primary care provider at risk of nursing home placement    Acute concern/Follow-up recommendations: Continue tx plan    Next CP visit scheduled: 11/24/2020    Issues for Provider to follow up on: The [facility] assisted  the pt and roommate about placement of the Freestyle Kaylah.  She is looking forward to her injection for the pain in her Coccyx.  Recent BG as reported above.    Provider follow up visit needed: IJEOMA

## 2020-11-11 NOTE — PROGRESS NOTES
[facility] CORE Clinic - CardioMEMS Reading Review    November 11, 2020   CardioMEMS reviewed.  Current diuretic: Bumex 4 mg daily  Recent plan of care changes: none    Current Threshold parameters:       Today's Waveform:     Readings:           RHC Date Wedge Pressure MEMS PAD during cath  (average of the 3 values)     7/1/2019 w/MEMS implant     20 mmHg   18 mmHg           Rosina Mays RN

## 2020-11-13 DIAGNOSIS — Z11.59 ENCOUNTER FOR SCREENING FOR OTHER VIRAL DISEASES: ICD-10-CM

## 2020-11-13 PROCEDURE — U0003 INFECTIOUS AGENT DETECTION BY NUCLEIC ACID (DNA OR RNA); SEVERE ACUTE RESPIRATORY SYNDROME CORONAVIRUS 2 (SARS-COV-2) (CORONAVIRUS DISEASE [COVID-19]), AMPLIFIED PROBE TECHNIQUE, MAKING USE OF HIGH THROUGHPUT TECHNOLOGIES AS DESCRIBED BY CMS-2020-01-R: HCPCS | Performed by: PATHOLOGY

## 2020-11-14 LAB
SARS-COV-2 RNA SPEC QL NAA+PROBE: NOT DETECTED
SPECIMEN SOURCE: NORMAL

## 2020-11-16 NOTE — PROGRESS NOTES
[facility] CORE Clinic - CardioMEMS Reading Review    November 16, 2020   CardioMEMS reviewed . PAD in goal range  Current diuretic: Bumex 4 mg daily  Recent plan of care changes: none. Called Mariel and reviewed cardiomems with her.     Current Threshold parameters:       Today's Waveform:       Readings:

## 2020-11-17 ENCOUNTER — ANCILLARY PROCEDURE (OUTPATIENT)
Dept: RADIOLOGY | Facility: AMBULATORY SURGERY CENTER | Age: 75
End: 2020-11-17
Payer: MEDICARE

## 2020-11-17 ENCOUNTER — HOSPITAL ENCOUNTER (OUTPATIENT)
Facility: AMBULATORY SURGERY CENTER | Age: 75
Discharge: HOME | End: 2020-11-17
Payer: MEDICARE

## 2020-11-17 VITALS
HEIGHT: 66 IN | SYSTOLIC BLOOD PRESSURE: 186 MMHG | OXYGEN SATURATION: 98 % | RESPIRATION RATE: 16 BRPM | WEIGHT: 293 LBS | TEMPERATURE: 97.1 F | DIASTOLIC BLOOD PRESSURE: 63 MMHG | HEART RATE: 58 BPM | BODY MASS INDEX: 47.09 KG/M2

## 2020-11-17 DIAGNOSIS — M53.3 PAIN IN THE COCCYX: ICD-10-CM

## 2020-11-17 DIAGNOSIS — R52 PAIN: ICD-10-CM

## 2020-11-17 LAB — GLUCOSE BLDC GLUCOMTR-MCNC: 167 MG/DL (ref 70–99)

## 2020-11-17 PROCEDURE — 64999 UNLISTED PX NERVOUS SYSTEM: CPT

## 2020-11-17 RX ORDER — GADOBUTROL 604.72 MG/ML
INJECTION INTRAVENOUS PRN
Status: DISCONTINUED | OUTPATIENT
Start: 2020-11-17 | End: 2020-11-17 | Stop reason: HOSPADM

## 2020-11-17 RX ORDER — BUPIVACAINE HYDROCHLORIDE 2.5 MG/ML
INJECTION, SOLUTION EPIDURAL; INFILTRATION; INTRACAUDAL PRN
Status: DISCONTINUED | OUTPATIENT
Start: 2020-11-17 | End: 2020-11-17 | Stop reason: HOSPADM

## 2020-11-17 RX ORDER — METHYLPREDNISOLONE ACETATE 40 MG/ML
INJECTION, SUSPENSION INTRA-ARTICULAR; INTRALESIONAL; INTRAMUSCULAR; SOFT TISSUE PRN
Status: DISCONTINUED | OUTPATIENT
Start: 2020-11-17 | End: 2020-11-17 | Stop reason: HOSPADM

## 2020-11-17 RX ORDER — LIDOCAINE HYDROCHLORIDE 10 MG/ML
INJECTION, SOLUTION EPIDURAL; INFILTRATION; INTRACAUDAL; PERINEURAL PRN
Status: DISCONTINUED | OUTPATIENT
Start: 2020-11-17 | End: 2020-11-17 | Stop reason: HOSPADM

## 2020-11-17 ASSESSMENT — MIFFLIN-ST. JEOR: SCORE: 1963.72

## 2020-11-17 NOTE — DISCHARGE INSTRUCTIONS
PAIN INJECTION HOME CARE INSTRUCTIONS  Activity  -You may resume most normal activity levels with the exception of strenuous activity. It may help to move in ways that hurt before the injection, to see if the pain is still there, but do not overdo it. Take it easy for the rest of the day.    -DO NOT remove bandaid for 24 hours  -DO NOT shower for 24 hours      Pain  -You may feel immediate pain relief and numbness for a period of time after the injection. This may indicate the medication has reached the right spot.  -Your pain may return after this short pain-free period, or may even be a little worse for a day or two. It may be caused by needle irritation or by the medication itself. The medications usually take two or three days to start working, but can take as long as a week.    -You may use an ice pack for 20 minutes every 2 hours for the first 24 hours  -You may use a heating pad after the first 24 hours  -You may use Tylenol (acetaminophen) every 4 hours or other pain medicines as directed by your physician      DID YOU RECEIVE SEDATION TODAY?  No        DID YOU RECEIVE STEROIDS TODAY?  Yes    Common side effects of steroids:  Not everyone will experience corticosteroid side effects. If side effects are experienced, they will gradually subside in the 7-10 day period following an injection. Most common side effects include:  -Flushed face and/or chest  -Feeling of warmth, particularly in the face but could be an overall feeling of warmth  -Increased blood sugar in diabetic patients  -Menstrual irregularities my occur. If taking hormone-based birth control an alternate method of birth control is recommended  -Sleep disturbances and/or mood swings are possible  -Leg cramps    PLEASE KEEP TRACK OF YOUR SYMPTOMS AND NOTE ANY CHANGES FOR YOUR DOCTOR.       Please contact us if you have:  -Severe pain  -Fever more than 101.5 degrees Fahrenheit  -Signs of infection at the injection site (redness, swelling, or  drainage)    If you have questions, please contact the Pain Clinic at [phone] Option #1 between the hours of 7:00 am and 3:00 pm Monday through Friday. After office hours you can contact the on call provider by dialing [phone]. If you need immediate attention, we recommend that you go to a hospital emergency room or dial [phone].    For patients seen by the PM and R service, please call [phone].    If you have procedure scheduling questions please call [phone] Option #2

## 2020-11-17 NOTE — PROCEDURES
Ganglion Impar Injection    The patient's identity, the procedure to be performed and the specific site of the procedure was verified in accordance with The [facility] Protocol.     Diagnosis: Coccydynia   Pre-Procedure Pain Score:8/10     Procedure Note:     Informed consent was obtained.  The patient was positioned comfortably in the prone position. The patient was then prepped and draped in sterile fashion.  There was no evidence of infection at the site of needle insertion.  The skin was then anesthetized with 1% lidocaine.   Using flouroscopic guidance a 22 gauge spinal needle was passed through the sacrococcygeal ligament to a position just ventral to the sacral border. Position was confirmed with injection of radio-opaque contrast.  Medication was injected after negative aspiration. The patient tolerated the procedure well and was discharged from the clinic 30 minutes later.    The patient was given discharge instructions and verbalizes understanding, including understanding of those signs and symptoms that would require emergency care.     Medication Dose: 20mg methylprednisolone                                      4.5ml 0.25% Bupivacaine    Contrast was used to confirm needle placement (Gadobutrol - contrast allergy)  Post-Procedure Pain Score:8/10    Counseling: Greater than 50% of this patient visit was spent in counseling the patient regarding the treatment of their pain, coordinating their overall treatment plan and assessing their progress.    Rhona Mata MD  Pain Fellow    Dr. Porras was present for the entirety of the above procedure    I saw and examined the patient with the fellow and agree with the findings and the plan of care as documented in the fellow's note. I was present for the entire procedure.  Nguyễn Porras IV, MD

## 2020-11-18 ENCOUNTER — MYC MEDICAL ADVICE (OUTPATIENT)
Dept: FAMILY MEDICINE | Facility: CLINIC | Age: 75
End: 2020-11-18

## 2020-11-18 DIAGNOSIS — I50.32 CHRONIC DIASTOLIC HEART FAILURE (H): Primary | ICD-10-CM

## 2020-11-19 ENCOUNTER — VIRTUAL VISIT (OUTPATIENT)
Dept: ONCOLOGY | Facility: CLINIC | Age: 75
End: 2020-11-19
Attending: SURGERY
Payer: MEDICARE

## 2020-11-19 ENCOUNTER — PATIENT OUTREACH (OUTPATIENT)
Dept: CARE COORDINATION | Facility: CLINIC | Age: 75
End: 2020-11-19

## 2020-11-19 DIAGNOSIS — Z17.0 MALIGNANT NEOPLASM OF LOWER-INNER QUADRANT OF LEFT BREAST IN FEMALE, ESTROGEN RECEPTOR POSITIVE (H): ICD-10-CM

## 2020-11-19 DIAGNOSIS — C50.312 MALIGNANT NEOPLASM OF LOWER-INNER QUADRANT OF LEFT BREAST IN FEMALE, ESTROGEN RECEPTOR POSITIVE (H): ICD-10-CM

## 2020-11-19 DIAGNOSIS — L72.3 SEBACEOUS CYST: Primary | ICD-10-CM

## 2020-11-19 PROCEDURE — 99441 PR PHYSICIAN TELEPHONE EVALUATION 5-10 MIN: CPT | Mod: 95 | Performed by: SURGERY

## 2020-11-19 NOTE — PROGRESS NOTES
Oncology Distress Screening Follow-up  Clinical Social Work  [facility]    Identified Concern and Score From Distress Screenin. How concerned are you about unintended weight loss or your current weight?   10Abnormal            3. How concerned are you about feeling depressed or very sad?   8Abnormal            4. How concerned are you about feeling anxious or very scared?   8Abnormal                  Date of Distress Screenin2020    Intervention: Iván is a 75 year old woman diagnosed with breast cancer. Iván had a provider visit today with Dr. Gonzales where she expressed concern re: unintended weight loss, anxiety, depression. MARCO attempted to reach out to Iván to discuss further but was told by Iván's roommate, Odalis, that she was unavailable as she was taking a nap. Odalis stated that iván was doing well after her appointment and wasn't sure she would need SW follow up.  MARCO provided Odalis LARA contact info and encouraged Iván to call back.     Follow-up Required: SW will await a call back from iván  MARCO provided SW contact info and will remain available for ongoing resource/support needs.    NAKITA Barr, [facility]  [facility]  Phone: [phone]

## 2020-11-19 NOTE — PROGRESS NOTES
"Mariel Ribeiro is a 75 year old female who is being evaluated via a billable telephone visit.      The patient has been notified of following:     \"This telephone visit will be conducted via a call between you and your physician/provider. We have found that certain health care needs can be provided without the need for a physical exam.  This service lets us provide the care you need with a short phone conversation.  If a prescription is necessary we can send it directly to your pharmacy.  If lab work is needed we can place an order for that and you can then stop by our lab to have the test done at a later time.    Telephone visits are billed at different rates depending on your insurance coverage. During this emergency period, for some insurers they may be billed the same as an in-person visit.  Please reach out to your insurance provider with any questions.    If during the course of the call the physician/provider feels a telephone visit is not appropriate, you will not be charged for this service.\"    Patient has given verbal consent for Telephone visit?  Yes    What phone number would you like to be contacted at? [phone]    How would you like to obtain your AVS? JOSE GUADALUPE Friedman      The above were completed by the medical assistant for the visit.    FOLLOW-UP  Nov 19, 2020    Mariel Ribeiro is a 75 year old female who is phoning in for follow-up for LEFT breast DCIS.    Cancer Staging  Malignant neoplasm of lower-inner quadrant of left breast in female, estrogen receptor positive (H)  Staging form: Breast, AJCC 8th Edition  - Clinical: Stage 0 (cTis (DCIS), cN0, cM0, G2, ER+, [place]+, HER2: Not Assessed) - Signed by Anjelica Gonzales MD on 11/21/2019    Treatment to date:  1. Neoadjuvant anastrozole (6/1/2020 to ongoing)    HPI:    Since her last visit, she has been doing reasonably well.  However, she did have a fall 6 months ago that resulted in chronic coccygeal pain and she had a " procedure done for this 2 days ago and thus is recovering.  She has pain at the site of the sebaceous cyst on the left breast, manageable. She denies significant redness at the cyst, drainage, or fever/chills.  No breast masses, nipple discharge or nipple inversion.    INVESTIGATIONS:    Bilateral Diagnostic Mammogram and US (9/22/2020):  BREAST DENSITY: Scattered fibroglandular densities.  Findings:     Mammogram:  Technique: Standard mammographic views were performed with tomosynthesis and 2D reconstruction. Spot on the symphysis views of the area of clinical concern were also attempted. Of note, patient has some mobility issues and is in a wheelchair, making positioning challenging. There is a biopsy clip in the lower inner left breast at the site of biopsy-proven DCIS. A few residual punctate and coarse calcifications are noted adjacent to the clip. A BB marker is placed over the site of palpable concern cc view demonstrates a partially visualized oval mass.  Ultrasound:  Real-time targeted technologist and physician performed sonographic imaging of the left breast.  At the 8:00 position 15 cm from the nipple at the site of palpable concern, again seen and there is a circumscribed oval mass with heterogeneous echogenicity abutting the skin surface. This measures 2.8 x 1.7 x 2.4 cm, previously 2.7 x 1.5 x 1.9 cm. Minimal change in size is felt related to differences in technique.  IMPRESSION: BI-RADS CATEGORY: 6 - Known Biopsy-Proven Malignancy.    Assessment/Plan:  Diagnoses       Codes Comments    Sebaceous cyst    -  Primary L72.3     Malignant neoplasm of lower-inner quadrant of left breast in female, estrogen receptor positive (H)     C50.312, Z17.0          ASSESSMENT:  Mariel Ribeiro is a 75 year old female with left breast DCIS.    She continues to opt for non-operative management for the DCIS, which is reasonable, particularly given the COVID-19 pandemic. She is tolerating endocrine therapy.  I will  defer ongoing care to Dr Weber. She will follow up with me if/when she opts for surgical management of DCIS.     We discussed not scratching at the sebaceous cyst to reduce the risk of infection. We discussed that if it did become infected, antibiotics and drainage would be recommended acutely.  Excision could be performed (along with the left breast DCIS, if desired) when the infection/inflammatory changes have resolved.    All of the above was discussed and all questions were answered.    PLAN:  1. Defer ongoing care to Dr Gus Gonzales MD MSc FRC FACS    Division of Surgical Oncology  [facility]     Phone call duration: 6 minutes

## 2020-11-19 NOTE — LETTER
"    11/19/2020         RE: Mariel Ribeiro  [address] [place] [address]        Dear Colleague,    Thank you for referring your patient, Mariel Ribeiro, to the [facility]. Please see a copy of my visit note below.    Mariel Ribeiro is a 75 year old female who is being evaluated via a billable telephone visit.      The patient has been notified of following:     \"This telephone visit will be conducted via a call between you and your physician/provider. We have found that certain health care needs can be provided without the need for a physical exam.  This service lets us provide the care you need with a short phone conversation.  If a prescription is necessary we can send it directly to your pharmacy.  If lab work is needed we can place an order for that and you can then stop by our lab to have the test done at a later time.    Telephone visits are billed at different rates depending on your insurance coverage. During this emergency period, for some insurers they may be billed the same as an in-person visit.  Please reach out to your insurance provider with any questions.    If during the course of the call the physician/provider feels a telephone visit is not appropriate, you will not be charged for this service.\"    Patient has given verbal consent for Telephone visit?  Yes    What phone number would you like to be contacted at? [phone]    How would you like to obtain your AVS? JOSE GUADALUPE Friedman      The above were completed by the medical assistant for the visit.    FOLLOW-UP  Nov 19, 2020    Mariel Ribeiro is a 75 year old female who is phoning in for follow-up for LEFT breast DCIS.    Cancer Staging  Malignant neoplasm of lower-inner quadrant of left breast in female, estrogen receptor positive (H)  Staging form: Breast, AJCC 8th Edition  - Clinical: Stage 0 (cTis (DCIS), cN0, cM0, G2, ER+, [place]+, HER2: Not Assessed) - Signed by Anjelica Gonzales, " MD on 11/21/2019    Treatment to date:  1. Neoadjuvant anastrozole (6/1/2020 to ongoing)    HPI:    Since her last visit, she has been doing reasonably well.  However, she did have a fall 6 months ago that resulted in chronic coccygeal pain and she had a procedure done for this 2 days ago and thus is recovering.  She has pain at the site of the sebaceous cyst on the left breast, manageable. She denies significant redness at the cyst, drainage, or fever/chills.  No breast masses, nipple discharge or nipple inversion.    INVESTIGATIONS:    Bilateral Diagnostic Mammogram and US (9/22/2020):  BREAST DENSITY: Scattered fibroglandular densities.  Findings:     Mammogram:  Technique: Standard mammographic views were performed with tomosynthesis and 2D reconstruction. Spot on the symphysis views of the area of clinical concern were also attempted. Of note, patient has some mobility issues and is in a wheelchair, making positioning challenging. There is a biopsy clip in the lower inner left breast at the site of biopsy-proven DCIS. A few residual punctate and coarse calcifications are noted adjacent to the clip. A BB marker is placed over the site of palpable concern cc view demonstrates a partially visualized oval mass.  Ultrasound:  Real-time targeted technologist and physician performed sonographic imaging of the left breast.  At the 8:00 position 15 cm from the nipple at the site of palpable concern, again seen and there is a circumscribed oval mass with heterogeneous echogenicity abutting the skin surface. This measures 2.8 x 1.7 x 2.4 cm, previously 2.7 x 1.5 x 1.9 cm. Minimal change in size is felt related to differences in technique.  IMPRESSION: BI-RADS CATEGORY: 6 - Known Biopsy-Proven Malignancy.    Assessment/Plan:  Diagnoses       Codes Comments    Sebaceous cyst    -  Primary L72.3     Malignant neoplasm of lower-inner quadrant of left breast in female, estrogen receptor positive (H)     C50.312, Z17.0           ASSESSMENT:  Mariel Ribeiro is a 75 year old female with left breast DCIS.    She continues to opt for non-operative management for the DCIS, which is reasonable, particularly given the COVID-19 pandemic. She is tolerating endocrine therapy.  I will defer ongoing care to Dr Weber. She will follow up with me if/when she opts for surgical management of DCIS.     We discussed not scratching at the sebaceous cyst to reduce the risk of infection. We discussed that if it did become infected, antibiotics and drainage would be recommended acutely.  Excision could be performed (along with the left breast DCIS, if desired) when the infection/inflammatory changes have resolved.    All of the above was discussed and all questions were answered.    PLAN:  1. Def[facility] ongoing care to Dr [facility]    Division of Surgical Oncology  [facility]     Phone call duration: 6 minutes      Again, thank you for allowing me to participate in the care of your patient.        Sincerely,        Anjelica Gonzales MD

## 2020-11-23 ENCOUNTER — MYC MEDICAL ADVICE (OUTPATIENT)
Dept: CARDIOLOGY | Facility: CLINIC | Age: 75
End: 2020-11-23

## 2020-11-23 NOTE — PROGRESS NOTES
[facility] - CardioMEMS Reading Review    November 23, 2020   CardioMEMS reviewed and routed to Austin Miguel NP   Current diuretic: Bumex 4 mg daily  Recent plan of care changes: none.   Called Mariel to review mems. Has had a few numbers on low side over the weekend. Reports her mems machine wasn't transmitting over weekend so they unplugged and replugged it in and now is transmitting.   She reports her weight has been stable last few days around 314-315. Last week on Tuesday was 319. She does notice more SOB since the beginning of the month. Reviewed with her that her mems numbers are on the lower side so may not necessarily be fluid. Told her I would update provider with her numbers and weights. Is back in range today so told her will watch her numbers. If any changes recommended will let her know.       Current Threshold parameters:       Today's Waveform:       Readings:           RHC Date Wedge Pressure MEMS PAD during cath  (average of the 3 values)     7/1/2019 w/MEMS implant     20 mmHg   18 mmHg           Rosina Mays RN

## 2020-11-24 ENCOUNTER — ALLIED HEALTH/NURSE VISIT (OUTPATIENT)
Dept: BEHAVIORAL HEALTH | Facility: CLINIC | Age: 75
End: 2020-11-24
Payer: COMMERCIAL

## 2020-11-24 VITALS
OXYGEN SATURATION: 95 % | SYSTOLIC BLOOD PRESSURE: 124 MMHG | RESPIRATION RATE: 16 BRPM | BODY MASS INDEX: 50.23 KG/M2 | HEART RATE: 56 BPM | WEIGHT: 293 LBS | DIASTOLIC BLOOD PRESSURE: 70 MMHG | TEMPERATURE: 97.6 F

## 2020-11-24 DIAGNOSIS — M54.50 BILATERAL LOW BACK PAIN, UNSPECIFIED CHRONICITY, UNSPECIFIED WHETHER SCIATICA PRESENT: Primary | ICD-10-CM

## 2020-11-24 PROCEDURE — 99207 PR COMMUNITY PARAMEDIC - PATIENT NOT BILLABLE: CPT

## 2020-11-24 ASSESSMENT — ACTIVITIES OF DAILY LIVING (ADL): DEPENDENT_IADLS:: SHOPPING

## 2020-11-24 NOTE — TELEPHONE ENCOUNTER
Called and spoke with Mariel. Explained rationale that Dr. Wolf would like to see her in person to monitor her and her health and to prevent, if at all possible, hospitalization over the next few months given the limited availability of beds. Patient agreed with keeping appointments tomorrow as scheduled.

## 2020-11-25 ENCOUNTER — DOCUMENTATION ONLY (OUTPATIENT)
Dept: CARDIOLOGY | Facility: CLINIC | Age: 75
End: 2020-11-25

## 2020-11-25 ENCOUNTER — TELEPHONE (OUTPATIENT)
Dept: CARDIOLOGY | Facility: CLINIC | Age: 75
End: 2020-11-25

## 2020-11-25 NOTE — TELEPHONE ENCOUNTER
[facility] Call Center    Phone Message    May a detailed message be left on voicemail: yes     Reason for Call: Other: pts roommate geeta is calling to see if there can be an exception made for her to come with iván to the clinic, please call geeta thanks     Action Taken: Message routed to:  [facility] heart    Travel Screening: Not Applicable

## 2020-11-25 NOTE — PROGRESS NOTES
Mariel's Cardiomems readings for today's visit with Dr. Wolf. Goal PA Diastolic range is 14 - 18.

## 2020-11-27 ENCOUNTER — ALLIED HEALTH/NURSE VISIT (OUTPATIENT)
Dept: CARDIOLOGY | Facility: CLINIC | Age: 75
End: 2020-11-27
Attending: NURSE PRACTITIONER
Payer: MEDICARE

## 2020-11-27 DIAGNOSIS — I50.32 CHRONIC DIASTOLIC HEART FAILURE (H): Primary | ICD-10-CM

## 2020-11-27 PROCEDURE — 93264 REM MNTR WRLS P-ART PRS SNR: CPT | Performed by: NURSE PRACTITIONER

## 2020-11-27 NOTE — TELEPHONE ENCOUNTER
Called and reviewed with patient importance of an in-person visit. Patient will be seen 12/23 in person with labs the day prior locally. Per CAMRON Peres for Odalis to accompany patient due to mobility needs. Patient verbalized understanding and is in agreement to the plan.

## 2020-11-29 DIAGNOSIS — E03.8 OTHER SPECIFIED HYPOTHYROIDISM: ICD-10-CM

## 2020-11-30 NOTE — PROGRESS NOTES
[facility] Follow-up Visit  November 24, 2020  TIME: 1430    Mariel Ribeiro is a 75 year old female being seen at home for a follow-up visit.    Present at appointment: Patient        Chief Complaint   Patient presents with     Outreach     b       [place] Utilization:   Clinic Utilization  Difficulty keeping appointments:: No  Compliance Concerns: No  No-Show Concerns: No  No PCP office visit in Past Year: No  Utilization    Last refreshed: 11/29/2020  7:18 PM: Hospital Admissions 0           Last refreshed: 11/29/2020  7:18 PM: ED Visits 1           Last refreshed: 11/29/2020  7:18 PM: No Show Count (past year) 1              Current as of: 11/29/2020  7:18 PM              /70   Pulse 56   Temp 97.6  F (36.4  C)   Resp 16   Wt 141.2 kg (311 lb 3.2 oz)   SpO2 95%   BMI 50.23 kg/m      Clinical Concerns:  Current Medical Concerns:  Back pain     Current Behavioral Concerns: na    Education Provided to patient: no   Medication set up? No  Pill Box issued: No  Scale issued: No  Health Maintenance Reviewed:      Clinical Pathway: None    No  Face to Face in Home / Community      Review of Symptoms/PE    Skin: negative  Eyes: negative  Ears/Nose/Throat: negative  Respiratory: Shortness of breath-   Cardiovascular: negative  Gastrointestinal: negative  Genitourinary: negative  Musculoskeletal: back pain  Neurologic: negative  Psychiatric: negative    Pain Management::  Pain (GOAL):: Yes  Type: Acute (<3mo)  Location of chronic pain:: coccyx-? fracture due to fall per chiropractor  Radiating: No  Progression: Waxing and Waning  Description of pain: Aching, Nagging  Chronic pain severity:: 5  Limitation of routine activities due to chronic pain:: Yes(limits walking due to pain)  Description: Able to do most things most days with some rest  Alleviating Factors: Rest, Ice, Heat, Other(got thick mattress pad for her bed; sits on pillows)  Aggravating Factors: Activity    Medication  Reconciliation  Medication adherence problem (GOAL):: No  Knowledgeable about how to use meds:: Yes  Medication side effects suspected:: No    Diet/Exercise/Sleep  Diet:: Diabetic diet  Inadequate nutrition (GOAL):: No  Tube Feeding: No  Inadequate activity/exercise (GOAL):: Yes  Significant changes in sleep pattern (GOAL): No    Plan:     Time spent with patient: 60    The patient meets one or more of the following criteria:  * Has been identified by their primary care provider at risk of nursing home placement    Acute concern/Follow-up recommendations: Continue tx plan    Next CP visit scheduled: 12/8/2020    Issues for Provider to follow up on: Pt reports minimal relief with cortisone shot.    Provider follow up visit needed: Multiple upcoming
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
## 2020-12-01 RX ORDER — LEVOTHYROXINE SODIUM 150 MCG
TABLET ORAL
Qty: 90 TABLET | Refills: 3 | Status: SHIPPED | OUTPATIENT
Start: 2020-12-01 | End: 2021-12-06

## 2020-12-01 NOTE — TELEPHONE ENCOUNTER
Routing refill request to provider for review/approval because:  Labs not current:    TSH   Date Value Ref Range Status   10/01/2019 5.02 (H) 0.40 - 4.00 mU/L Final

## 2020-12-01 NOTE — PROGRESS NOTES
Remote monitoring of Pulmonary Artery Pressures via CardioMEMS device reviewed at least weekly and as needed with [facility] nursing. Diuretics adjusted accordingly.      10/28 through 11/26- billing cycle complete. Continue to monitor.    Austin CORNEJO NP-C

## 2020-12-02 ENCOUNTER — PATIENT OUTREACH (OUTPATIENT)
Dept: CARE COORDINATION | Facility: CLINIC | Age: 75
End: 2020-12-02

## 2020-12-02 NOTE — PROGRESS NOTES
[facility] - CardioMEMS Reading Review    December 2, 2020   CardioMEMS reviewed. PAD within goal range.   Current diuretic: 4 mg daily Bumex  Recent plan of care changes: none    Current Threshold parameters:       Today's Waveform:       Readings:           RHC Date Wedge Pressure MEMS PAD during cath  (average of the 3 values)     7/1/2019 w/MEMS implant     20 mmHg   18 mmHg           Rosina Mays RN

## 2020-12-02 NOTE — PROGRESS NOTES
"Clinic Care Coordination Contact    Follow Up Progress Note   45 day chart review     Assessment: Patient Enrolled 4/6/20.  Patient currently has 2 specialty care coordinators and the community paramedic.  Patient transferred to [facility] RN.  Remaining Goal related to \"monitor right knee instability\".    Goals addressed this encounter:   Goals Addressed    None          Intervention/Education provided during outreach: N/A          Plan:   At next outreach inquire if patient has any new goals for CCC.  Otherwise okay to transition to Maintenance.    Care Coordinator will follow up in 45 days per Standard Work  "
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
## 2020-12-09 DIAGNOSIS — Z79.4 TYPE 2 DIABETES MELLITUS WITH STAGE 3A CHRONIC KIDNEY DISEASE, WITH LONG-TERM CURRENT USE OF INSULIN (H): ICD-10-CM

## 2020-12-09 DIAGNOSIS — N18.31 TYPE 2 DIABETES MELLITUS WITH STAGE 3A CHRONIC KIDNEY DISEASE, WITH LONG-TERM CURRENT USE OF INSULIN (H): ICD-10-CM

## 2020-12-09 DIAGNOSIS — E11.22 TYPE 2 DIABETES MELLITUS WITH STAGE 3A CHRONIC KIDNEY DISEASE, WITH LONG-TERM CURRENT USE OF INSULIN (H): ICD-10-CM

## 2020-12-09 RX ORDER — FLASH GLUCOSE SENSOR
KIT MISCELLANEOUS
Qty: 6 EACH | Refills: 3 | Status: SHIPPED | OUTPATIENT
Start: 2020-12-09 | End: 2021-11-29

## 2020-12-09 RX ORDER — FLASH GLUCOSE SCANNING READER
1 EACH MISCELLANEOUS
Qty: 1 DEVICE | Refills: 0 | Status: SHIPPED | OUTPATIENT
Start: 2020-12-09 | End: 2024-01-01

## 2020-12-09 NOTE — TELEPHONE ENCOUNTER
I have resent the rxs I sent in October-- not sure what this is triggering but the PA team should be handling this for patient

## 2020-12-10 ENCOUNTER — ALLIED HEALTH/NURSE VISIT (OUTPATIENT)
Dept: BEHAVIORAL HEALTH | Facility: CLINIC | Age: 75
End: 2020-12-10
Payer: COMMERCIAL

## 2020-12-10 VITALS
SYSTOLIC BLOOD PRESSURE: 140 MMHG | TEMPERATURE: 97.6 F | RESPIRATION RATE: 16 BRPM | OXYGEN SATURATION: 98 % | HEART RATE: 62 BPM | DIASTOLIC BLOOD PRESSURE: 80 MMHG

## 2020-12-10 DIAGNOSIS — Z79.4 TYPE 2 DIABETES MELLITUS WITH STAGE 3 CHRONIC KIDNEY DISEASE, WITH LONG-TERM CURRENT USE OF INSULIN (H): Primary | ICD-10-CM

## 2020-12-10 DIAGNOSIS — E11.22 TYPE 2 DIABETES MELLITUS WITH STAGE 3 CHRONIC KIDNEY DISEASE, WITH LONG-TERM CURRENT USE OF INSULIN (H): Primary | ICD-10-CM

## 2020-12-10 DIAGNOSIS — N18.30 TYPE 2 DIABETES MELLITUS WITH STAGE 3 CHRONIC KIDNEY DISEASE, WITH LONG-TERM CURRENT USE OF INSULIN (H): Primary | ICD-10-CM

## 2020-12-10 PROCEDURE — 99207 PR COMMUNITY PARAMEDIC - PATIENT NOT BILLABLE: CPT

## 2020-12-10 NOTE — PROGRESS NOTES
[facility] CORE Clinic - CardioMEMS Reading Review    December 10, 2020   CardioMEMS reviewed. PAD within parameters (with exception of one day at 19).   Current diuretic: Bumex 4 mg daily  Recent plan of care changes: none    Current Threshold parameters:       Today's Waveform:       Readings:           RHC Date Wedge Pressure MEMS PAD during cath  (average of the 3 values)     7/1/2019 w/MEMS implant     20 mmHg   18 mmHg           Rosina Mays RN

## 2020-12-10 NOTE — PROGRESS NOTES
Community Paramedics Follow-up Visit  December 10, 2020  TIME: 1500    Mariel Ribeiro is a 75 year old female being seen at home for a follow-up visit.    Present at appointment:  patient         Chief Complaint   Patient presents with     Outreach       [place] Utilization:      Utilization    Last refreshed: 12/10/2020  7:35 AM: Hospital Admissions 0           Last refreshed: 12/10/2020  7:35 AM: ED Visits 1           Last refreshed: 12/10/2020  7:35 AM: No Show Count (past year) 1              Current as of: 12/10/2020  7:35 AM              BP (!) 140/80   Pulse 62   Temp 97.6  F (36.4  C)   Resp 16   SpO2 98%     Clinical Concerns:  Current Medical Concerns:  Diabetes, breast cancer    Current Behavioral Concerns: none    Education Provided to patient: no   Medication set up? No  Pill Box issued: No  Scale issued: No  Health Maintenance Reviewed:      Clinical Pathway: None    No  Face to Face in Home / Community    Recent blood sugars: Average for last 7 days 124,  0680-3668 124  3539-1098 111  9025-3577 129  [id number]  137/79, 122/51, 124/56, 148/57, 118/62,146/58    Review of Symptoms/PE    Skin: negative  Eyes: visual blurring, glasses  Ears/Nose/Throat: negative  Respiratory: Dyspnea on exertion-   Cardiovascular: negative  Gastrointestinal: negative  Genitourinary: negative  Musculoskeletal: back pain and joint stiffness  Neurologic: numbness or tingling of hands  Psychiatric: depression    Pain Management::                 Plan:     Time spent with patient: 60    The patient meets one or more of the following criteria:  * Has been identified by their primary care provider at risk of nursing home placement    Acute concern/Follow-up recommendations: Continue tx plan.    Next CP visit scheduled:12.21/2020     Provider follow up visit needed: TBD

## 2020-12-11 ENCOUNTER — PATIENT OUTREACH (OUTPATIENT)
Dept: CARE COORDINATION | Facility: CLINIC | Age: 75
End: 2020-12-11

## 2020-12-11 NOTE — PROGRESS NOTES
Clinic Care Coordination Contact  Community Health Worker Follow Up    Goals:   Goals Addressed as of 12/11/2020 at 12:02 PM        Patient Stated      Other (pt-stated)     Added 12/11/20 by Evy Paulino MA     Goal Statement: I would like to attend diabetic education in the next month.  Date Goal set: 12/11/2020   Barriers: Understanding diet and more  Strengths: open to scheduling  Date to Achieve By: 2/1/2020  Patient expressed understanding of goal: yes    Action steps to achieve this goal:  1. I will call diabetic ed to schedule an appointment in the next month.  2. I will attend my appointment once scheduled.   3. I will let my CHW know at next outreach when this appointment is scheduled.             Intervention and Education during outreach:   Spoke with Mariel today. Her previous goal she said, was old. We created a new goal today as she said Rafaela provided her the diabetic ed phone number.     CHW Plan:   CHW will reach out in 1 month.

## 2020-12-14 NOTE — PROGRESS NOTES
[facility] CORE Clinic - CardioMEMS Reading Review    December 14, 2020   CardioMEMS reviewed and routed to [place] Miguel NP  Current diuretic: Bumex 4 mg daily  Recent plan of care changes: none. Called Mariel to review her cardiomems readings. Reports she has been feeling ok. Has been having some shortness of breath which she has had for a while. Her weight is stable between 311-313.     Current Threshold parameters:       Today's Waveform:       Readings:           RHC Date Wedge Pressure MEMS PAD during cath  (average of the 3 values)     7/1/2019 w/MEMS implant     20 mmHg   18 mmHg           Rosina Mays RN

## 2020-12-18 ENCOUNTER — TELEPHONE (OUTPATIENT)
Dept: FAMILY MEDICINE | Facility: CLINIC | Age: 75
End: 2020-12-18

## 2020-12-18 DIAGNOSIS — T78.40XD ALLERGIC REACTION, SUBSEQUENT ENCOUNTER: ICD-10-CM

## 2020-12-18 RX ORDER — EPINEPHRINE 0.3 MG/.3ML
0.3 INJECTION SUBCUTANEOUS
Qty: 0.6 ML | Refills: 3 | Status: SHIPPED | OUTPATIENT
Start: 2020-12-18 | End: 2023-02-07

## 2020-12-18 NOTE — TELEPHONE ENCOUNTER
Routing refill request to provider for review/approval because:  Medication is reported/historical    Please sign off on med if ok.    thank you

## 2020-12-18 NOTE — TELEPHONE ENCOUNTER
Reason for Call:  Other Medication request    Detailed comments: Pending Prescriptions:                       Disp   Refills    EPINEPHrine (ANY BX GENERIC EQUIV) 0.3 MG*0.6 mL 0            Sig: Inject 0.3 mLs (0.3 mg) into the muscle once as           needed for anaphylaxis       [id number] - [place], [place] - [address] AT [facility] [address]      Phone Number Patient can be reached at: Cell number on file:    Telephone Information:   Mobile [phone]       Best Time: anytime    Can we leave a detailed message on this number? YES    Call taken on 12/18/2020 at 2:46 PM by Segrio Reilly

## 2020-12-21 ENCOUNTER — PATIENT OUTREACH (OUTPATIENT)
Dept: BEHAVIORAL HEALTH | Facility: CLINIC | Age: 75
End: 2020-12-21

## 2020-12-21 DIAGNOSIS — N39.46 MIXED INCONTINENCE: Primary | ICD-10-CM

## 2020-12-21 NOTE — PROGRESS NOTES
The Community Paramedic reached out to the pt to set up an appointment with the DME.  A 3 way call was initiated and a virtual appointment was made for 12/28/2020.  The pt requested that the Community Paramedic be present at the appointment.    The pt reports lack of sleep due to having to get up every 2 hours to urinate and is also frustrated with her incontinence.  She states that her lack of sleep is really interfering with her overall health and would like to discuss a possible referral with Urology.

## 2020-12-22 DIAGNOSIS — I50.32 CHRONIC DIASTOLIC HEART FAILURE (H): ICD-10-CM

## 2020-12-22 LAB
ANION GAP SERPL CALCULATED.3IONS-SCNC: 5 MMOL/L (ref 3–14)
BUN SERPL-MCNC: 33 MG/DL (ref 7–30)
CALCIUM SERPL-MCNC: 10 MG/DL (ref 8.5–10.1)
CHLORIDE SERPL-SCNC: 108 MMOL/L (ref 94–109)
CO2 SERPL-SCNC: 28 MMOL/L (ref 20–32)
CREAT SERPL-MCNC: 1.52 MG/DL (ref 0.52–1.04)
GFR SERPL CREATININE-BSD FRML MDRD: 33 ML/MIN/{1.73_M2}
GLUCOSE SERPL-MCNC: 164 MG/DL (ref 70–99)
POTASSIUM SERPL-SCNC: 3.9 MMOL/L (ref 3.4–5.3)
SODIUM SERPL-SCNC: 141 MMOL/L (ref 133–144)

## 2020-12-22 PROCEDURE — 80048 BASIC METABOLIC PNL TOTAL CA: CPT | Performed by: INTERNAL MEDICINE

## 2020-12-22 PROCEDURE — 36415 COLL VENOUS BLD VENIPUNCTURE: CPT | Performed by: INTERNAL MEDICINE

## 2020-12-23 ENCOUNTER — OFFICE VISIT (OUTPATIENT)
Dept: CARDIOLOGY | Facility: CLINIC | Age: 75
End: 2020-12-23
Attending: INTERNAL MEDICINE
Payer: MEDICARE

## 2020-12-23 VITALS
BODY MASS INDEX: 47.09 KG/M2 | OXYGEN SATURATION: 96 % | HEART RATE: 62 BPM | WEIGHT: 293 LBS | DIASTOLIC BLOOD PRESSURE: 73 MMHG | SYSTOLIC BLOOD PRESSURE: 137 MMHG | HEIGHT: 66 IN

## 2020-12-23 DIAGNOSIS — I50.32 CHRONIC DIASTOLIC HEART FAILURE (H): Chronic | ICD-10-CM

## 2020-12-23 PROCEDURE — G0463 HOSPITAL OUTPT CLINIC VISIT: HCPCS

## 2020-12-23 PROCEDURE — 99214 OFFICE O/P EST MOD 30 MIN: CPT | Mod: GC | Performed by: INTERNAL MEDICINE

## 2020-12-23 RX ORDER — LOSARTAN POTASSIUM 50 MG/1
25 TABLET ORAL DAILY
Qty: 45 TABLET | Refills: 1 | Status: SHIPPED | OUTPATIENT
Start: 2020-12-23 | End: 2021-06-29

## 2020-12-23 ASSESSMENT — PAIN SCALES - GENERAL: PAINLEVEL: NO PAIN (0)

## 2020-12-23 ASSESSMENT — MIFFLIN-ST. JEOR: SCORE: 1961.91

## 2020-12-23 NOTE — NURSING NOTE
Chief Complaint   Patient presents with     Follow Up     75 year old female presents with chronic diastolic heart failure. Labs to be done day prior.      Vitals were taken and medications were reconciled.    Dayanara Thomas RMA  8:26 AM

## 2020-12-23 NOTE — PROGRESS NOTES
[facility] CORE Clinic - CardioMEMS Reading Review    December 23, 2020   CardioMEMS reviewed. In clinic today with Dr Wolf.   Current diuretic: Bumex 4 mg daily.   Recent plan of care changes: none    Current Threshold parameters:       Today's Waveform:       Readings:           RHC Date Wedge Pressure MEMS PAD during cath  (average of the 3 values)     7/1/2019 w/MEMS implant     20 mmHg   18 mmHg           Rosina Mays RN

## 2020-12-23 NOTE — NURSING NOTE
Diet: Patient instructed regarding a heart failure healthy diet, including discussion of reduced fat and 2000 mg daily sodium restriction, daily weights, medication purpose and compliance, fluid restrictions and resources for patient and family to access for assistance with heart failure management.       Labs: Patient was given results of the laboratory testing obtained today and patient was instructed about when to return for the next laboratory testing.     Med Reconcile: Reviewed and verified all current medications with the patient. The updated medication list was printed and given to the patient. NO CHANGES. Will call to clarify losartan dose.    Return Appointment: Patient given instructions regarding scheduling next clinic visit. RTC for virtual CORE visit in 2-3 months. RTC for in person CORE appt with labs and echo prior in 6 months. RTC to see Dr. Wolf in 1 year.    Patient stated she understood all health information given and agreed to call with further questions or concerns.     Shayna Newell RN

## 2020-12-23 NOTE — PATIENT INSTRUCTIONS
Cardiology Providers you saw during your visit:  Dr. Wolf     Medication changes:  1- No medication changes.      Follow up:  1- Return to see CORE virtual visit in 2-3 months.   2- Return to see CORE in person with echo prior in 6 months.  3- return to see Dr. Wolf in 1 year.     Please call if you have:  1. Weight gain of more than 2 pounds in a day or 5 pounds in a week  2. Increased shortness of breath, swelling or bloating  3. Dizziness, lightheadedness   4. Any questions or concerns.      Follow the American Heart Association Diet and Lifestyle recommendations:  Limit saturated fat, trans fat, sodium, red meat, sweets and sugar-sweetened beverages. If you choose to eat red meat, compare labels and select the leanest cuts available.  Aim for at least 150 minutes of moderate physical activity or 75 minutes of vigorous physical activity - or an equal combination of both - each week.     During business hours: [phone], press option # 1 to be routed to the [facility] then option # 4 to send a message to your care team     After hours, weekends or holidays: On Call Cardiologist- [phone]   option #4 and ask to speak to the on-call Cardiologist. Inform them you are a CORE/heart failure patient at the [facility].     Shayna Newell, RN BSN  Cardiology Nurse Care Coordinator     Keep up the good work!     Take Care!
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
## 2020-12-27 ENCOUNTER — ALLIED HEALTH/NURSE VISIT (OUTPATIENT)
Dept: CARDIOLOGY | Facility: CLINIC | Age: 75
End: 2020-12-27
Attending: NURSE PRACTITIONER
Payer: MEDICARE

## 2020-12-27 DIAGNOSIS — I50.32 CHRONIC DIASTOLIC HEART FAILURE (H): Primary | ICD-10-CM

## 2020-12-27 PROCEDURE — 93264 REM MNTR WRLS P-ART PRS SNR: CPT | Performed by: NURSE PRACTITIONER
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
## 2020-12-28 ENCOUNTER — VIRTUAL VISIT (OUTPATIENT)
Dept: ONCOLOGY | Facility: CLINIC | Age: 75
End: 2020-12-28
Attending: INTERNAL MEDICINE
Payer: MEDICARE

## 2020-12-28 ENCOUNTER — ALLIED HEALTH/NURSE VISIT (OUTPATIENT)
Dept: BEHAVIORAL HEALTH | Facility: CLINIC | Age: 75
End: 2020-12-28
Payer: COMMERCIAL

## 2020-12-28 ENCOUNTER — VIRTUAL VISIT (OUTPATIENT)
Dept: EDUCATION SERVICES | Facility: CLINIC | Age: 75
End: 2020-12-28
Payer: MEDICARE

## 2020-12-28 DIAGNOSIS — Z79.4 TYPE 2 DIABETES MELLITUS WITH STAGE 3 CHRONIC KIDNEY DISEASE, WITH LONG-TERM CURRENT USE OF INSULIN (H): Primary | ICD-10-CM

## 2020-12-28 DIAGNOSIS — C50.312 MALIGNANT NEOPLASM OF LOWER-INNER QUADRANT OF LEFT BREAST IN FEMALE, ESTROGEN RECEPTOR POSITIVE (H): ICD-10-CM

## 2020-12-28 DIAGNOSIS — Z17.0 MALIGNANT NEOPLASM OF LOWER-INNER QUADRANT OF LEFT BREAST IN FEMALE, ESTROGEN RECEPTOR POSITIVE (H): ICD-10-CM

## 2020-12-28 DIAGNOSIS — Z79.4 TYPE 2 DIABETES MELLITUS WITH STAGE 3A CHRONIC KIDNEY DISEASE, WITH LONG-TERM CURRENT USE OF INSULIN (H): ICD-10-CM

## 2020-12-28 DIAGNOSIS — E11.22 TYPE 2 DIABETES MELLITUS WITH STAGE 3 CHRONIC KIDNEY DISEASE, WITH LONG-TERM CURRENT USE OF INSULIN (H): Primary | ICD-10-CM

## 2020-12-28 DIAGNOSIS — D05.12 DUCTAL CARCINOMA IN SITU (DCIS) OF LEFT BREAST: Primary | ICD-10-CM

## 2020-12-28 DIAGNOSIS — E11.22 TYPE 2 DIABETES MELLITUS WITH STAGE 3A CHRONIC KIDNEY DISEASE, WITH LONG-TERM CURRENT USE OF INSULIN (H): ICD-10-CM

## 2020-12-28 DIAGNOSIS — N18.31 TYPE 2 DIABETES MELLITUS WITH STAGE 3A CHRONIC KIDNEY DISEASE, WITH LONG-TERM CURRENT USE OF INSULIN (H): ICD-10-CM

## 2020-12-28 DIAGNOSIS — N18.30 TYPE 2 DIABETES MELLITUS WITH STAGE 3 CHRONIC KIDNEY DISEASE, WITH LONG-TERM CURRENT USE OF INSULIN (H): Primary | ICD-10-CM

## 2020-12-28 PROCEDURE — 99207 PR COMMUNITY PARAMEDIC - PATIENT NOT BILLABLE: CPT

## 2020-12-28 PROCEDURE — G0108 DIAB MANAGE TRN  PER INDIV: HCPCS | Mod: 95 | Performed by: REGISTERED NURSE

## 2020-12-28 PROCEDURE — 99442 PR PHYSICIAN TELEPHONE EVALUATION 11-20 MIN: CPT | Mod: 95 | Performed by: INTERNAL MEDICINE

## 2020-12-28 PROCEDURE — 999N001193 HC VIDEO/TELEPHONE VISIT; NO CHARGE

## 2020-12-28 RX ORDER — ANASTROZOLE 1 MG/1
1 TABLET ORAL DAILY
Qty: 90 TABLET | Refills: 3 | Status: SHIPPED | OUTPATIENT
Start: 2020-12-28 | End: 2022-03-03

## 2020-12-28 ASSESSMENT — ACTIVITIES OF DAILY LIVING (ADL): DEPENDENT_IADLS:: SHOPPING

## 2020-12-28 NOTE — PROGRESS NOTES
"Mariel Ribeiro is a 75 year old female who is being evaluated via a billable video visit.      The patient has been notified of following:     \"This video visit will be conducted via a call between you and your physician/provider. We have found that certain health care needs can be provided without the need for an in-person physical exam.  This service lets us provide the care you need with a video conversation.  If a prescription is necessary we can send it directly to your pharmacy.  If lab work is needed we can place an order for that and you can then stop by our lab to have the test done at a later time.    Video visits are billed at different rates depending on your insurance coverage.  Please reach out to your insurance provider with any questions.    If during the course of the call the physician/provider feels a video visit is not appropriate, you will not be charged for this service.\"    Patient has given verbal consent for Video visit? Yes  How would you like to obtain your AVS? MyChart  If you are dropped from the video visit, the video invite should be resent to: Send to e-mail at: [email or web address]  Will anyone else be joining your video visit? No        Video-Visit Details    Type of service:  Video Visit    Video Start Time: 09:15 AM  Video End Time: 10:29 AM    Originating Location (pt. Location): Home    Distant Location (provider location):  [facility] DIABETES EDUCATION [place]     Platform used for Video Visit: Guy Llanos RN    Diabetes Self-Management Education & Support  DIABETES EDUCATION NOTE:    Mariel Ribeiro presents today for education related to Type 2 diabetes    Patient is being treated with:  insulin  She is accompanied by Odalis joe    PATIENT CONCERNS RELATED TO DIABETES SELF MANAGEMENT:     She was referred by Dr. Lowery, however she identifies Muriel Will as the provider that primarily manages her diabetes.    She is concerned because she " is having hypoglycemia overnight.  (Also having throughout the day).  She isn't sure what to do about this.    She has been using a [employer]Tip  14 day flash glucose monitor (FGM) for about the past 6 weeks.  She is scanning a couple of times per day.    She feels rather sluggish, and sometimes having trouble problem solving.  She wonders if this is related to her blood glucoses.    ASSESSMENT:  Current Diabetes Management per Patient:  Taking diabetes medications?   yes:     Diabetes Medication(s)     Insulin       insulin glargine (LANTUS SOLOSTAR) 100 UNIT/ML pen    Inject  33 units  daily subcutaneously.  call  if blood sugar <70, often >200.    Meglitinide Analogues       repaglinide (PRANDIN) 1 MG tablet    Take 1 tablet (1 mg) by mouth 3 times daily (before meals)        Monitoring  Patient glucose self monitoring as follows: four times daily using her Freestyle flash glucose monitor (FGM).    Report below:        Patient's most recent   Lab Results   Component Value Date    A1C 6.9 06/24/2020    is meeting goal of <7.0 with too frequent hypoglycemia.     Activity: no regular exercise program    Nutrition:   Not assessed     Hypoglycemia:   Patient is at risk of hypoglycemia? Yes    If yes, point at which symptoms are experienced:  Less than 70 mg/dL   What is standard treatment?:  No standard treatment at this point.                         EDUCATION and INSTRUCTION PROVIDED AT THIS VISIT:       She states that she has been taking her Lantus (33 units), but is taking it at various times during the day.  She tends to wake up sometime between 11 and 2pm, so sometimes she takes it earlier, and sometimes later.      She is having hypoglycemia occurring not only overnight, but throughout the day as well.  Discussed that this is likely the result of too much insulin, so asked her to reduce the dose from 33 units daily to 31 units daily.  This may cause a rise in her post prandial glucoses, however she is low (according  to her Kaylah report) 13% of the time, so reducing her Lantus dose is the appropriate thing to do.      She is scanning her device several times a day, but apparently not frequently enough to get a continuous trend graph, so requested that she start scanning her sensor before each meal and before she goes to bed at night.      Assisted Odalis with uploading the Kaylah reader to the [employer] website and encouraged her to do this routinely prior to appointments.      Discussed recognition and appropriate treatment for hypoglycemia and instructions sent via AVS regarding this.     Patient-stated goal written and given to Mariel Ribeiro.  Verbalized and demonstrated understanding of instructions.     PLAN:  See patient instructions  AVS printed and given to patient    FOLLOW-UP:        Time spent with patient at today's visit was 60 minutes.    Follow Up appointment on Thursday Jan 7 at 4 pm by video.       Any diabetes medication dose changes were made via the CDE Protocol and Collaborative Practice Agreement with Destiney and  Eulalio.  A copy of this encounter was provided to patient's referring provider.

## 2020-12-28 NOTE — PROGRESS NOTES
[facility] Follow-up Visit  December 28, 2020  TIME: 0900    Mariel Ribeiro is a 75 year old female being seen at home for a follow-up visit.    Present at appointment:  Patient, DME, [place]         Chief Complaint   Patient presents with     Outreach     Co visit with DME       Universal Utilization:      Utilization    Last refreshed: 12/28/2020  8:41 AM: Hospital Admissions 0           Last refreshed: 12/28/2020  8:41 AM: ED Visits 1           Last refreshed: 12/28/2020  8:41 AM: No Show Count (past year) 1              Current as of: 12/28/2020  8:41 AM              There were no vitals taken for this visit.    Clinical Concerns:  Current Medical Concerns:  Diabetes    Current Behavioral Concerns: None    Education Provided to patient: yes by DME.   Medication set up? No  Pill Box issued: No  Scale issued: No  Health Maintenance Reviewed:      Clinical Pathway: None    No  Face to Face in Home / Community    Recent blood sugars: 130 fasting,  Freestyle monitor uploaded to web site.     Review of Symptoms/PE    Skin: negative  Eyes: negative  Ears/Nose/Throat: negative  Respiratory: No shortness of breath, dyspnea on exertion, cough, or hemoptysis  Cardiovascular: negative  Gastrointestinal: negative  Genitourinary: nocturia, frequency and incontinence  Musculoskeletal: back pain  Neurologic: numbness or tingling of hands  Psychiatric: negative    Pain Management::                 Plan:     Time spent with patient: 90    The patient meets one or more of the following criteria:  * Requires services to prevent readmission to a nursing home or hospital    Acute concern/Follow-up recommendations: Continue tx plan.    Next CP visit scheduled: 1/19/2021    Issues for Provider to follow up on: Visit was with the DME and patient. Please see notes from today.  [facility] was at the appointment at the request of the pt.    Provider follow up visit needed: TBD
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
## 2020-12-28 NOTE — PATIENT INSTRUCTIONS
Diabetes Instructions:    Chuckie Floyd, it was great to meet you today, as well as Odalis and Rafaela.      You are having quite a bit of hypoglycemia overnight, which is most likely due to having too much insulin on board.  Please reduce your dose of Lantus from 33 units to 31 units.      It's important that you try to take your Lantus at the same time every day.  It doesn't matter what time of day you take it, as it doesn't have anything to do with eating or food, but it's important that it be within the same hour every day, so pick a time when you know that you will always be awake, so you can get this on a more regular schedule.      Here are some helpful tips for using the [place]ah 14 day sensor:      INSTRUCTIONS FOR KAYLAH FLASH GLUCOSE MONITORING SYSTEM:    1.  Sensor is FDA approved for placement in the upper posterior arm, although people have successfully worn it in other areas, such as the abdomen, upper outer thighs and buttocks.     2.  Sensor will need to be replaced every 14 days.  [place] is warranteed for 3 years.    3.  Sensor is water-proof.  You can shower with it in and swim up to 3 feet for up to 30 minutes.        4.  Each time a new sensor is started, it requires a 1 hour warm up period.  No information will be available for the 1 hour period.  After that, the sensor will update every 5 minutes.  The reader needs to be held directly over the sensor until the reader beeps to let you know it picked up the reading.  The sensor has to be read a minimum of every 8 hours in order for a continuous trend graph to be available.  In other words, if it is not read 3 times a day, there will be gaps in the trend graph or no data at all.     5.  There will be [id number] arrows with each reading that will indicate the approximate speed with which your glucose is changing and the direction that it is heading.     6.  Data from the reader can be viewed by connecting the reader to a USB port in your computer and  uploading the device to the Dashride website.  You've already done this once, so doing it before each appointment is really helpful for your providers to look at, as it provides a 24 hour trend graph.      7.  If the sensor is unsure of it's own accuracy a symbol will appear in the reader window cueing you to check a blood glucose.  It is highly recommended to check a blood glucose before correcting a high reading or treating a low reading.      8.  Large doses of Vitamin C can affect the accuracy of the sensor, so it is recommended that you avoid taking more than 500 mg vitamin C daily.      Website for uploading your Dashride reader is: [email or web address].com    Patient Education     Insulin Reaction (Low Blood Sugar)   Also called Hypoglycemia:    Your blood sugar may drop if you take too much insulin. Or it may happen when you take your normal amount of insulin but don't get enough food. This can be from vomiting or loss of appetite. Other causes of low blood sugar are:     Heavy exercise    Strong emotions    Alcohol    Tobacco    Caffeine  Some medicines can also affect blood sugar. These include:     Aspirin    Amiodarone    Doxycycline    Quinine    Ketoconazole?    Gabapentin    Topiramate    Valproate?    Haloperidol    Propoxyphene    Chlorpromazine    Sertraline    Propranolol    Disopyramide    ACE inhibitors    Fluoroquinolone antibiotics  Try caffeine-free drinks if you think caffeine may lower your blood sugar. If you smoke, get help to quit. This is one of the best things you can do to protect your health. If you take any of the medicines listed above, talk with your healthcare provider about switching to some other type.   A class of medicines called beta-blockers is used for high blood pressure, rapid heart rate, and other health problems. These may mask the early signs of low blood sugar. You may not know when your blood sugar is getting low. If you are taking a beta-blocker, talk with your healthcare provider  about switching to a different class. Some beta-blockers are:     Propranolol    Atenolol    Metoprolol    Nadolol    Labetalol    Carvedilol  Home care     Rest for the next 24 hours. Eat small meals often. This will help keep your blood sugar up.    Learn the signs your body gives as your blood sugar drops (see below).  If symptoms of low blood sugar return     Always keep a source of fast-acting sugar with you. At the first sign of low blood sugar, eat or drink 15 to 20 grams of fast-acting sugar. Examples include:  ? 3 to 4 glucose tablets or glucose gel. These are sold at most [employer].  ? 4 ounces of regular soda  ? 4 ounces of fruit juice  ? 2 tablespoons of raisins  ? 1 tablespoon of honey    Check your blood sugar 15 minutes after treating yourself. If it's still low, take another 15 to 20 grams of fast-acting sugar. Test again in 15 minutes. If it s still low, go to an emergency room.    Once your blood sugar is normal again, eat a snack or meal with protein to keep your blood sugar in a safe range.  In the future, you may need to lower your insulin dose if you aren't able to eat your normal amount at each meal because of illness or vomiting. Call your healthcare provider right away. Ask him or her about changing your dose for a little bit.   Check your blood sugar every 4 to 6 hours. Do this until you can start eating normal amounts again.   Wear a medical alert bracelet or necklace or carry a card in your wallet that says you have diabetes. It will help healthcare providers give you correct care if you have a severe low blood sugar reaction and can't tell them you have the disease.   Follow-up care   Follow up with your healthcare provider, or as advised.  Check and write down your blood sugar and insulin dose twice a day. Do this before breakfast and before dinner. Do this for the next 5 days. See your healthcare provider in the next week to review these records. This will help tell if you need to  change your insulin dose.   If you often have episodes of low blood sugar, your healthcare provider may give you glucagon shots. Or the provider may give you these shots if your episodes of low blood sugar are severe. These shots quickly raise your blood sugar. One of your family members or friends will need to learn how to give you this shot.   To learn more about diabetes, visit the American Diabetes Association's website at www.diabetes.org.   When to seek medical advice   Call your healthcare provider right away if any of these symptoms of low blood sugar occur and they don't go away with the above steps:     Fatigue    Headache    Shakes    Lots of sweating    Hunger    Feeling anxious or restless    Eyesight changes    Personality changes  Call 911  Call 911 or get emergency care if any of these occur and don't go away quickly with the above steps:     Confusion    Seizure    Lightheadedness, dizziness, or loss of consciousness    Drowsiness    Weakness  Eunice last reviewed this educational content on 11/1/2018 2000-2020 [employer]. [address] [place] [address], [place] [address]. All rights reserved. This information is not intended as a substitute for professional medical care. Always follow your healthcare professional's instructions.

## 2020-12-30 NOTE — PROGRESS NOTES
Remote monitoring of Pulmonary Artery Pressures via CardioMEMS device reviewed at least weekly and as needed with CORE nursing. Diuretics adjusted accordingly.    11/27 through 12/28- billing is done.       Austin CORNEJO NP-C

## 2021-01-06 NOTE — PROGRESS NOTES
[facility] - CardioMEMS Reading Review    January 6, 2021   CardioMEMS reviewed. PAD in goal range.   Current diuretic: Bumex 4 mg daily.   Recent plan of care changes: none    Current Threshold parameters:       Today's Waveform:       Readings:           RHC Date Wedge Pressure MEMS PAD during cath  (average of the 3 values)     7/1/2019 w/MEMS implant     20 mmHg   18 mmHg           Rosina Mays RN

## 2021-01-07 ENCOUNTER — VIRTUAL VISIT (OUTPATIENT)
Dept: EDUCATION SERVICES | Facility: CLINIC | Age: 76
End: 2021-01-07
Payer: MEDICARE

## 2021-01-07 DIAGNOSIS — Z79.4 TYPE 2 DIABETES MELLITUS WITH STAGE 3 CHRONIC KIDNEY DISEASE, WITH LONG-TERM CURRENT USE OF INSULIN (H): Primary | Chronic | ICD-10-CM

## 2021-01-07 DIAGNOSIS — E11.22 TYPE 2 DIABETES MELLITUS WITH STAGE 3 CHRONIC KIDNEY DISEASE, WITH LONG-TERM CURRENT USE OF INSULIN (H): Primary | Chronic | ICD-10-CM

## 2021-01-07 DIAGNOSIS — N18.30 TYPE 2 DIABETES MELLITUS WITH STAGE 3 CHRONIC KIDNEY DISEASE, WITH LONG-TERM CURRENT USE OF INSULIN (H): Primary | Chronic | ICD-10-CM

## 2021-01-07 DIAGNOSIS — Z79.4 TYPE 2 DIABETES MELLITUS WITH HYPERGLYCEMIA, WITH LONG-TERM CURRENT USE OF INSULIN (H): ICD-10-CM

## 2021-01-07 DIAGNOSIS — E11.65 TYPE 2 DIABETES MELLITUS WITH HYPERGLYCEMIA, WITH LONG-TERM CURRENT USE OF INSULIN (H): ICD-10-CM

## 2021-01-07 PROCEDURE — G0108 DIAB MANAGE TRN  PER INDIV: HCPCS | Mod: 95 | Performed by: REGISTERED NURSE

## 2021-01-08 NOTE — PATIENT INSTRUCTIONS
Patient Education     Goals for Your Diabetes Care  A1c   Goal:  Less than 7 percent--ask your doctor if this is right for you  Check it: Every 3 to 6 months  Why:  Shows how well your blood glucose was controlled over the past 3 months  Blood pressure   Goal: Under 140/90  Check it:  At every clinic check up for diabetes  Why: May prevent stroke, heart disease and eye and kidney diseases  Cholesterol   Goal:  Discuss cholesterol management with your doctor, and consider taking a statin medicine  Check it:  Every year  Why:  Helps prevent heart attacks and stroke  Kidney test (urine microalbumin)  Goal:  Under 30  Do it:  Every year  Why:  Finds kidney problems before they get worse  Foot exam   Goal:  No cuts or sores, normal sensation  Do it: Remove shoes and socks at every visit; have a monofilament test every year  Why: Finds foot problems before they get worse  Eye exam   Goal:  No changes in your eyes  Do it: Every year  Why:  Finds eye problems before they get worse  Exercise  Goal:  150 minutes of aerobic exercises and 2 to 3 sessions of strength training  Do it: Every week  Why:  Helps manage diabetes and improve health  Aspirin  Goal:  If you are age 50 or older, ask your doctor if you should take aspirin  Take it: Every day, or as prescribed  Why: Lowers the risk of heart attack and stroke  Smoking and tobacco  Goal: No tobacco use  Why: Tobacco is a major risk for heart disease  Diabetes education  Goal: Learn how to manage your diabetes  Do it: At least once a year---ask your doctor for a referral  Why: The more you know, the better you can manage your diabetes  Your goals may be different from what you see here. Your care team will tell you what your goals should be.   For informational purposes only. Not to replace the advice of your health care provider.   Copyright   2009 [employer]. All rights reserved. Click [employer] [id number] - Rev 01/16.  For informational purposes only. Not to replace  the advice of your health care provider.  Copyright   2018 [facility]. All rights reserved.           Patient Education     Diabetes: Sick-Day Plan    Infections, the flu, and even a cold can cause your blood sugar to rise. And eating less or having nausea and vomiting may cause your blood sugar to fall. Ask your healthcare provider to help you make a sick-day plan.   Plan ahead for an illness. Tell 1 or 2 friends or family members about blood sugar monitoring and the symptoms that need emergency care. Put together a  sick box  with medicines and easy-to-fix foods. Try to gather all these supplies before you need them. Put a copy of the sick-day plan in the box.   Don ts  Don'ts include:    Don t stop taking your diabetes medicine.    Don't take other medicines without advice. Check with your healthcare provider first. This includes medicines such as those for colds or the flu.  Do s  Do's include:     Eating. Stick to your meal plan, if you can. If you can t eat, try fruit juice, regular gelatin, or frozen juice bars as directed by your healthcare provider.    Drinking. Drink at least 1 glass of liquid every half hour. If you re eating, these liquids should be sugar-free.    Checking blood sugar. Check your blood sugar as often as your healthcare provider told you to do so. You may need to check it more often than usual.    Checking ketones.  Check your blood or urine for ketones. Ketones are the waste from burning fat instead of glucose for energy. They are a warning sign of ketoacidosis. Ketoacidosis is a medical emergency. It can happen to anyone with diabetes. But it's very rare in type 2 diabetes. It is often only an issue if you have type 1 diabetes. Or it can be a problem if you are taking a certain type of medicine for type 2 diabetes. This medicine type is called an SGLT-2 inhibitor.    Taking diabetes medicines.  ? Adjust your insulin based on your sick-day plan. Don't skip insulin. You need  insulin even if you can't eat your normal meals.  ? If you take pills for diabetes (oral medicines), take your normal dose unless your healthcare provider tells you something different.    Choosing sugar-free medicines. Look for sugar-free cough drops and other medicines. Ask your healthcare provider if it s OK for you to take these.    Getting help. If you're alone, ask someone to check on you several times a day.  Call your healthcare provider  Call your healthcare provider if you have any of the following:    You vomit or have diarrhea for more than 6 hours.    Your blood sugar level is higher than usual or more than 250 mg/dL even after you have taken extra insulin (if recommended in your sick-day plan).    You take oral medicine for diabetes, and your blood sugar is higher than usual or over 250 mg/dL, before a meal and stays that high for more than 24 hours.    Your blood sugar is lower than usual or less than 70 mg/dL.    You have moderate to large amounts of ketones in your blood or urine.    You feel weak standing up. Or you have signs of dehydration, such as dry lips or tongue.    You aren t better after 2 days.    You can't stay awake or think clearly.  Eunice last reviewed this educational content on 9/1/2018 2000-2020 [employer]. [address] [place] [address], [place] [address]. All rights reserved. This information is not intended as a substitute for professional medical care. Always follow your healthcare professional's instructions.           Patient Education     Step-by-Step:  Treating High Blood Sugar (Hyperglycemia)    Eunice last reviewed this educational content on 12/1/2016 2000-2020 [employer]. [address] [place] [address]. All rights reserved. This information is not intended as a substitute for professional medical care. Always follow your healthcare professional's instructions.

## 2021-01-08 NOTE — PROGRESS NOTES
Video-Visit Details    Type of service:  Video Visit  Patient consented to video visit  Video Start Time:  1600  Video End Time:    1650  Originating Location (pt. Location): Home  Distant Location (provider location):  [facility] DIABETES EDUCATION [place]   Platform used for Video Visit: [email or web address]     Diabetes Self-Management Education & Support  DIABETES EDUCATION NOTE:    Mariel Ribeiro presents today for education related to Type 2 diabetes    Patient is being treated with:  insulin  She is accompanied by friend and roommate, Odalis    PATIENT CONCERNS RELATED TO DIABETES SELF MANAGEMENT:   Follow up from previous visit, in which we adjusted her basal insulin dose down to prevent hypoglycemia, and had her work on trying to take her insulin at about the same time every day    PROGRESS TOWARD GOALS:    Doing well on current regimen.  She reduced her Lantus dose from 33 to 31 units and is taking it between 2 pm and 3 pm every day.      ASSESSMENT:  Current Diabetes Management per Patient:  Taking diabetes medications?   yes:     Diabetes Medication(s)     Insulin       insulin glargine (LANTUS SOLOSTAR) 100 UNIT/ML pen    Inject  31 units  daily subcutaneously.  Call  if blood sugar <70, often >200.    Meglitinide Analogues       repaglinide (PRANDIN) 1 MG tablet    Take 1 tablet (1 mg) by mouth 3 times daily (before meals)        Monitoring  Patient glucose self monitoring as follows: > 4 times daily using her Freestyle Kaylah  Kaylah Report Below:            Patient's most recent   Lab Results   Component Value Date    A1C 6.9 06/24/2020    is meeting goal of <7.0    Activity: no regular exercise program    Nutrition:   This is excerpted from the food records sent in by Odalis, her roommate.    Monday 1/4: breakfast: 2 eggs scrambled with cheese, mixed fruit with vanilla activia.                              dinner:  cream of wheat with milk, raisins and brown sugar     Tuesday 1/5: breakfast: 2 eggs  scrambled with cheese, small container of peaches with vanilla activia                               snack: white Scott crackers and cheese (cranberry gouda spread)                               dinner: pork tenderloin with onions, apples and sweet potatos; 2 anastasiya cookies     Wednesday1/6: breakfast: breakfast: 2 eggs scrambled with cheese and avocado, mixed fruit with vanilla                                    activia.                                    snack peppermint bark and 4 squares dark chocolate                                    dinner: rotisserie chicken breast, 1/2 small sweet potato with butter, mixed veggies with butter       Hypoglycemia:   No recent hypoglycemia seen since we reduced his Lantus dose.  We have reviewed appropriate treatment of hypoglycemia at a previous visit.     Healthy Coping and Stress Management:  She is adhering to restrictions (mask wearing, staying home, etc) related to the Covid-19 pandemic.  She is worried about getting it, as she has a number of risk factors for severe disease.  Odalis is a significant support for her, and manages some of her medical needs, as well as some of her ADL's.                        EDUCATION and INSTRUCTION PROVIDED AT THIS VISIT:      Positive feedback given for the changes she has made.  Her diet looks overall, very healthy with controlled carbohydrate intake.  She has some rises in late evening which are sometimes related to snacking in the evening.  Discussed making sure that she controls the amount of snacking she is doing.      We discussed what sort of high numbers should concern her.  Reviewed the multiple factors that can affect blood glucose and cause a rise, outside of food, which include both physical and emotional stress.  Discussed management of high (greater than 250 mg/dL) glucoses, more frequent checking, and calling her diabetes care team if glucoses are not resolving after 8-12 hours, or if she measures a glucose of >300  mg/dL more than twice in one day.  Given the contact numbers for the endocrinology clinic as well as the after hours on-call number.  Discussed in detail the need to adequately hydrate with zero-calorie fluids when glucoses are high, and the need to get professional guidance in managing this.      Reviewed how she is doing with all of her screening tests.  She has both nephropathy, which is being followed by the nephrology team; neuropathy in both hands and feets, affecting both balance and manual dexterity.  She has found that using the Kaylah has been helpful as poking her fingertips is very painful.  She has a home care nurse who is checking her feet and wrapping her legs on a regular basis. She recently had an eye exam, which she reports was unchanged from previously.      At this time, I am sending her instructions on how to manage high glucoses and illness.  I have provided her with my contact information in case of questions or problems.  She see's Muriel Will on a regular basis and at this time, I don't see an urgent need to start rapid acting insulin right now.  She is managing well with 81% time in range on one injection of long acting insulin daily.      Patient-stated goal written and given to Mariel Ribeiro.  Verbalized and demonstrated understanding of instructions.     PLAN:  See patient instructions  AVS printed and given to patient    FOLLOW-UP:      At this point, she is in reasonable control on the regimen she is following and further follow up by diabetes education is not planned.  She has appointments set up with Muriel Will for further followup.      Time spent with patient at today's visit was 50 minutes.      Any diabetes medication dose changes were made via the CDE Protocol and Collaborative Practice Agreement with [facility] and  [facility].  A copy of this encounter was provided to patient's referring provider.

## 2021-01-12 ENCOUNTER — VIRTUAL VISIT (OUTPATIENT)
Dept: UROLOGY | Facility: CLINIC | Age: 76
End: 2021-01-12
Attending: FAMILY MEDICINE
Payer: MEDICARE

## 2021-01-12 VITALS — BODY MASS INDEX: 47.09 KG/M2 | HEIGHT: 66 IN | WEIGHT: 293 LBS

## 2021-01-12 DIAGNOSIS — N39.46 MIXED INCONTINENCE: ICD-10-CM

## 2021-01-12 PROCEDURE — 99204 OFFICE O/P NEW MOD 45 MIN: CPT | Mod: 95 | Performed by: PHYSICIAN ASSISTANT

## 2021-01-12 RX ORDER — TOLTERODINE 2 MG/1
CAPSULE, EXTENDED RELEASE ORAL
Qty: 180 CAPSULE | Refills: 3 | Status: SHIPPED | OUTPATIENT
Start: 2021-01-12 | End: 2021-12-27

## 2021-01-12 RX ORDER — TOLTERODINE 2 MG/1
4 CAPSULE, EXTENDED RELEASE ORAL DAILY
Qty: 90 CAPSULE | Refills: 3 | Status: SHIPPED | OUTPATIENT
Start: 2021-01-12 | End: 2021-01-12

## 2021-01-12 ASSESSMENT — PAIN SCALES - GENERAL: PAINLEVEL: SEVERE PAIN (7)

## 2021-01-12 ASSESSMENT — MIFFLIN-ST. JEOR: SCORE: 1936.51
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
## 2021-01-12 NOTE — NURSING NOTE
"Chief Complaint   Patient presents with     Mixed Incontinence       Height 1.676 m (5' 6\"), weight 142 kg (313 lb), not currently breastfeeding. Body mass index is 50.52 kg/m .    Patient Active Problem List   Diagnosis     Hypothyroidism     Diverticulitis of colon     Coronary atherosclerosis     Myalgia and myositis     Migraine     Chronic airway obstruction/ COPD     Mononeuritis     Obstructive sleep apnea     Vitamin D deficiency     Hypertension goal BP (blood pressure) < 130/80     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)     Hyperlipidemia with target LDL less than 70     Chronic diastolic heart failure (H)     Osteoarthritis     Morbid obesity (H)     Arthritis of knee     Transient cerebral ischemia     History of thyroid cancer     Cervicalgia     Fatty liver disease, nonalcoholic     Hepatomegaly     Angina at rest (H)     S/P CABG x 3     Type 2 diabetes mellitus with stage 3 chronic kidney disease, with long-term current use of insulin (H)     Lymphedema     Lower back pain     Bilateral carotid artery disease (H)     Spinal stenosis of lumbar region, unspecified whether neurogenic claudication present     Status post coronary angiogram     Hemoptysis     Intertrigo     Malignant neoplasm of lower-inner quadrant of left breast in female, estrogen receptor positive (H)     Pain in the coccyx       Allergies   Allergen Reactions     Contrast Dye Anaphylaxis     Fish Allergy Anaphylaxis     Iodine Anaphylaxis     Oxycodone Other (See Comments)     Severe suicidal tendencies on this medication     Tree Nuts [Nuts] Anaphylaxis     Tree nuts only (peanuts ok)     Bactrim      Increased uric acid     Betadine [Povidone Iodine] Hives, Swelling and Difficulty breathing     Betadine     Combivent      Rash     Dulaglutide Other (See Comments)     Hx . Of thyroid cancer.     Fish      Lisinopril Other (See Comments)     Scr/grf severely reduced.      Penicillins Rash     Allopurinol Rash     Latex Rash     " Wool Fiber Rash       Current Outpatient Medications   Medication Sig Dispense Refill     acetaminophen (TYLENOL) 325 MG tablet Take 650 mg by mouth every 4 hours as needed for mild pain Take 2 tablets by mouth every 4 hours as needed for mild pain 100 tablet      albuterol (PROAIR HFA/PROVENTIL HFA/VENTOLIN HFA) 108 (90 Base) MCG/ACT inhaler Inhale 2 puffs into the lungs every 6 hours 3 Inhaler 0     anastrozole (ARIMIDEX) 1 MG tablet Take 1 tablet (1 mg) by mouth daily 90 tablet 3     aspirin (ASA) 81 MG EC tablet Take 1 tablet (81 mg) by mouth daily       atorvastatin (LIPITOR) 40 MG tablet TAKE 1 TABLET(40 MG) BY MOUTH DAILY 90 tablet 3     bumetanide (BUMEX) 1 MG tablet Take 4 mg in the morning, 2 mg in the afternoon for 3 days, then decrease to 4 mg daily. 280 tablet 3     escitalopram (LEXAPRO) 20 MG tablet TAKE 1 TABLET(20 MG) BY MOUTH EVERY MORNING 90 tablet 3     ferrous gluconate (FERGON) 324 (38 Fe) MG tablet TAKE 1 TABLET BY MOUTH EVERY MONDAY, WEDNESDAY, AND FRIDAY MORNING 36 tablet 3     folic acid (FOLVITE) 1 MG tablet Take 2 tablets (2 mg) by mouth daily       guaiFENesin (MUCINEX) 600 MG 12 hr tablet Take 1 tablet (600 mg) by mouth 2 times daily       insulin glargine (LANTUS SOLOSTAR) 100 UNIT/ML pen Inject  31 units  daily subcutaneously.  Call  if blood sugar <70, often >200. 15 mL 1     losartan (COZAAR) 50 MG tablet Take 0.5 tablets (25 mg) by mouth daily 45 tablet 1     metoprolol succinate ER (TOPROL-XL) 25 MG 24 hr tablet Please take 25 mg in the morning and 50 mg at night. 120 tablet 11     miconazole (MICATIN/MICRO GUARD) 2 % external powder Apply topically as needed for itching or other Redness in bilateral groin and katharine clef 43 g 0     niacin ER (NIASPAN) 500 MG CR tablet TAKE 1 TABLET(500 MG) BY MOUTH TWICE DAILY 180 tablet 3     nitroGLYcerin (NITROSTAT) 0.4 MG sublingual tablet For chest pain place 1 tablet under the tongue every 5 minutes for 3 doses. If symptoms persist 5 minutes  "after 1st dose call 911. 25 tablet 0     ONE TOUCH ULTRA (DEVICES) MISC test blood sugar BID 1 0     ONETOUCH ULTRA test strip USE FOUR TIMES DAILY 400 strip 1     potassium chloride ER (KLOR-CON M) 10 MEQ CR tablet Take 2 tablets (20 mEq) by mouth 2 times daily 120 tablet 8     pregabalin (LYRICA) 100 MG capsule Take 1 capsule (100 mg) by mouth 2 times daily 180 capsule 3     repaglinide (PRANDIN) 1 MG tablet Take 1 tablet (1 mg) by mouth 3 times daily (before meals) 90 tablet 3     senna-docusate (SENOKOT-S/PERICOLACE) 8.6-50 MG tablet Take 1-2 tablets by mouth 2 times daily as needed for constipation 30 tablet 0     Skin Protectants, Misc. (INTERDRY 10\"X36\") SHEE Externally apply 1 Box topically daily 1 sheet under breasts prn intertrigo 1 each 3     Skin Protectants, Misc. (INTERDRY AG TEXTILE 10\"X144\") SHEE Externally apply 1 Box topically daily Apply to areas of intertrigo prn 1 each 3     SYNTHROID 150 MCG tablet TAKE 1 TABLET(150 MCG) BY MOUTH DAILY 90 tablet 3     traMADol (ULTRAM) 50 MG tablet TAKE 1 TABLET(50 MG) BY MOUTH EVERY 6 HOURS AS NEEDED FOR BREAKTHROUGH PAIN OR SEVERE PAIN 60 tablet 1     traZODone (DESYREL) 50 MG tablet TAKE 3 TABLETS(150 MG) BY MOUTH AT BEDTIME 270 tablet 3     vitamin D3 (CHOLECALCIFEROL) 23803 units (250 mcg) capsule Take 1 capsule (10,000 Units) by mouth daily       blood glucose (ONETOUCH ULTRA) test strip Use to test blood sugar four times daily or as directed. (Patient not taking: Reported on 12/23/2020) 3 Box 3     buPROPion (WELLBUTRIN XL) 150 MG 24 hr tablet Take 1 tablet (150 mg) by mouth every morning (Patient not taking: Reported on 12/23/2020) 30 tablet 3     capsaicin (ZOSTRIX) 0.025 % external cream Apply 1 g topically 3 times daily For neuropathic pain. (Patient not taking: Reported on 12/23/2020) 60 g 1     Continuous Blood Gluc  (FREESTYLE MARTY 14 DAY READER) JEZ 1 Units 4 times daily (before meals and nightly) (Patient not taking: Reported on " 2020) 1 Device 0     Continuous Blood Gluc Sensor (FREESTYLE MARTY 14 DAY SENSOR) [facility] new sensor to back of arm q14 days to monitor blood sugars (Patient not taking: Reported on 2020) 6 each 3     EPINEPHrine (ANY BX GENERIC EQUIV) 0.3 MG/0.3ML injection 2-pack Inject 0.3 mLs (0.3 mg) into the muscle once as needed for anaphylaxis (Patient not taking: Reported on 2020) 0.6 mL 3     LIFESCAN FINEPOINT LANCETS MISC Use to test blood sugars 2 times daily or as directed. (Patient not taking: Reported on 2020) 100 each prn       Social History     Tobacco Use     Smoking status: Former Smoker     Packs/day: 0.00     Years: 27.00     Pack years: 0.00     Types: Cigarettes     Quit date: 1989     Years since quittin.5     Smokeless tobacco: Never Used   Substance Use Topics     Alcohol use: No     Alcohol/week: 0.0 standard drinks     Drug use: No       JOSE GUADALUPE Hancock  2021  2:07 PM

## 2021-01-12 NOTE — PATIENT INSTRUCTIONS
Below is a list of things that can irritate the bladder and should be eliminated:      Caffeinated soft drinks.    Coffee.    Tea.    Chocolate.    Tomato-based foods.    Acidic juices and fruits. (includes cranberry juice)    Alcohol.    Carbonated drinks.    Aspartame/Nutrasweet.    You have been prescribed medication to help relax your bladder.    This medication may help control (or improve) urinary frequency, urgency and urge incontinence.    Common side effects include:  Dry mouth (Biotene mouthwash can help with this.)  Constipation  Drowsiness  Blurred vision    Rare side effect:  Urinary retention

## 2021-01-14 NOTE — PROGRESS NOTES
[facility] - CardioMEMS Reading Review    January 14, 2021   CardioMEMS reviewed.   Current diuretic: Bumex 4 mg daily.   Recent plan of care changes: none    Current Threshold parameters:       Today's Waveform:       Readings:           RHC Date Wedge Pressure MEMS PAD during cath  (average of the 3 values)     7/1/2019 w/MEMS implant     20 mmHg   18 mmHg           Rosina Mays RN

## 2021-01-15 ENCOUNTER — MYC MEDICAL ADVICE (OUTPATIENT)
Dept: ANESTHESIOLOGY | Facility: CLINIC | Age: 76
End: 2021-01-15

## 2021-01-15 ENCOUNTER — HEALTH MAINTENANCE LETTER (OUTPATIENT)
Age: 76
End: 2021-01-15

## 2021-01-15 ENCOUNTER — VIRTUAL VISIT (OUTPATIENT)
Dept: ANESTHESIOLOGY | Facility: CLINIC | Age: 76
End: 2021-01-15
Payer: MEDICARE

## 2021-01-15 DIAGNOSIS — M47.819 FACET ARTHROPATHY: Primary | ICD-10-CM

## 2021-01-15 PROCEDURE — 99213 OFFICE O/P EST LOW 20 MIN: CPT | Mod: 95

## 2021-01-15 ASSESSMENT — PAIN SCALES - GENERAL: PAINLEVEL: SEVERE PAIN (6)

## 2021-01-15 NOTE — PROGRESS NOTES
AVS and pre-procedure instructions sent to the pt.   They were advised to contact the clinic if they are to have any questions.     Recommended procedure: Bilateral Lumbar Medial Branch Blocks    Anticoagulant: Pt is currently taking Asa 81 mg, they are okay to continue medication before procedure.     Recommended follow up: As indicated after the procedure.     Rosina Ruggiero LPN

## 2021-01-15 NOTE — PATIENT INSTRUCTIONS
Procedures:    Call to schedule your procedure: [phone], option #2    Bilateral Lumbar Medial Branch Blocks    Your pre-procedure instructions are below, please call our clinic if you have any questions.      Recommended Follow up:      As indicated after the Medial Branch Blocks, or 6 weeks after the Radiofrequency Ablation.        Please call [phone], option #1 to schedule your clinic appointment if you don't already have an appointment scheduled.    Procedure Information related to COVID-19    Please call [phone] option #2 to schedule, reschedule, or cancel your procedure appointment.   Phones are answered Monday - Friday from 08:00 - 4:30pm.  Leave a voicemail with your name, birth date, and phone number if no one is available to take your call.     You will need to be tested for COVID-19 within 4 days (96 hours) of your procedure.  You will be called to schedule your COVID test by a central scheduling team. If you have not been contacted to schedule your test within 4 days of the scheduled procedure, call [phone]    Please be aware that the turn around time for the test is approximately 24-72 hours.   If your results are still pending the day of your procedure, you will be notified as soon as possible as the procedure may be cancelled.    Please note: You will only be contacted for positive and pending results.       At this time there are NO VISITORS allowed.  The procedure center staff will call you several days before the procedure to review important information that you will need to know for the day of the procedure.   Please contact the clinic if you have further questions about this information.       Information related to Scheduling and Pre-Procedure Instructions:    You are OKAY to continue taking your Aspirin 81 mg before your procedure.    If you are having a Diagnostic procedure, you will be given a Pain Diary to document your pain relief.   Please fax the completed form to our  office.   fax number is [phone]    If you must reschedule your procedure more than two times, you must follow up in clinic before rescheduling again.        Preparing for your procedure    CAUTION - FAILURE TO FOLLOW THESE PRE-PROCEDURE INSTRUCTIONS WILL RESULT IN YOUR PROCEDURE BEING RESCHEDULED.      Your procedure: Bilateral Lumbar Medial Branch Blocks            You must have a  take you home after your procedure. Transportation by taxi or para-transit is okay as long as you have a responsible adult accompany you. You must provide your 's full name and contact number at time of check in.     Fasting Protocol Please have nothing to eat and drink for 2 hours before the procedure.      Medications If you take any medications, DO NOT STOP. Take your medications as usual the day of your procedure with a sip of water AT LEAST 2 HOURS PRIOR TO ARRIVAL.    Antibiotics If you are currently taking antibiotics, you must complete the entire dose 7 days prior to your scheduled procedure. You must be clear of any signs or symptoms of infection. If you begin antibiotics, please contact our clinic for instructions.     Fever, Chills, or Rash If you experience a fever of higher than 100 degrees, chills, rash, or open wounds during the one week before your procedure, please call the clinic to see if you may proceed with your procedure.      Medication Hold List  **Patients under Cardiology/Neurology care should consult their provider prior to the pain procedure to verify pre-procedure medication instructions. The information below contains general guidelines.**    You are OKAY to continue taking your Aspirin 81 mg before your procedure.    Blood Thinners If you are taking daily ASPIRIN, PLAVIX, OR OTHER BLOOD THINNERS SUCH AS COUMADIN/WARFARIN, we will need your prescribing doctor to sign a release permitting you to stop these medications. Once approved by your prescribing doctor - STOP ALL BLOOD THINNERS  BASED ON THE TIME TABLE BELOW PRIOR TO YOUR PROCEDURE. If you have been instructed to stop WARFARIN(COUMADIN), you must have an INR lab drawn the day before your procedure. . Your INR must be within normal limits before we can perform your injection. MEDICATIONS CAN BE RESTARTED AFTER YOUR PROCEDURE.    14 DAY HOLD  Ticlid (ticlopidine)    10 DAY HOLD  Effient (Prasugel)    3 DAY HOLD  Xarelto (rivaroxaban) 7 DAY HOLD  Anacin, Bufferin, Ecotrin, Excedrin, Aggrenox (Aspirin)  Brilinta (ticagrelor)  Coumadin (Warfarin)  Pradexa (Dabigatran)  Elmiron (Pentosan)  Plavix (Clopidogrel Bisulfate)  Pletal (Cilostazol)    24 HOUR HOLD  Lovenox (enoxaparin)  Agrylin (Anagrelide)        Non-steroidal Anti-inflammatories (NSAIDs) DO NOT TAKE any non-steroidal anti-inflammatory medications (NSAIDs) listed on the table below. MEDICATIONS CAN BE RESTARTED AFTER YOUR PROCEDURE. Celebrex is OK to take and does not need to be discontinued.     Medications to stop:  3 DAY HOLD  Advil, Motrin (Ibuprofen)  Arthrotec (diciofenac sodium/misoprostol)  Clinoril (Sulindac)  Indocin (Indomethacin)  Lodine (Etodolac)  Toradol (Ketorolac)  Vicoprofen (Hydrocodone and Ibuprofen)  Voltaren (Diclotenac)    14 DAY HOLD  Daypro (Oxaprozin)  Feldene (Piroxicam)   7 DAY HOLD  Aleve (Naproxen sodium)  Darvon compound (contains aspirin)  Naprosyn (Naproxen)  Norgesic Forte (contains aspirin)  Mobic (Meloxicam)  Oruvall (Ketoprofen)  Percodan (contains aspirin)  Relafen (Nabumetone)  Salsalate  Trilisate  Vitamin E (more than 400 mg per day)  Any medication containing aspirin                To speak with a nurse, schedule/reschedule/cancel a clinic appointment, or request a medication refill call: [phone]     You can also reach us by [employer]: [email or web address]
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
## 2021-01-18 DIAGNOSIS — Z11.59 ENCOUNTER FOR SCREENING FOR OTHER VIRAL DISEASES: Primary | ICD-10-CM

## 2021-01-18 PROBLEM — M47.819 FACET ARTHROPATHY: Status: ACTIVE | Noted: 2021-01-18

## 2021-01-20 NOTE — PROGRESS NOTES
[facility] - CardioMEMS Reading Review    January 20, 2021   CardioMEMS reviewed. PAD within goal range.   Current diuretic: Bumex 4 mg daily.   Recent plan of care changes:     Current Threshold parameters:       Today's Waveform:       Readings:           RHC Date Wedge Pressure MEMS PAD during cath  (average of the 3 values)     7/1/2019 w/MEMS implant     20 mmHg   18 mmHg           Rosina Mays RN

## 2021-01-22 ENCOUNTER — OFFICE VISIT (OUTPATIENT)
Dept: LAB | Facility: CLINIC | Age: 76
End: 2021-01-22
Payer: MEDICARE

## 2021-01-22 DIAGNOSIS — Z11.59 ENCOUNTER FOR SCREENING FOR OTHER VIRAL DISEASES: ICD-10-CM

## 2021-01-22 LAB
SARS-COV-2 RNA RESP QL NAA+PROBE: NORMAL
SPECIMEN SOURCE: NORMAL

## 2021-01-22 PROCEDURE — U0005 INFEC AGEN DETEC AMPLI PROBE: HCPCS

## 2021-01-22 PROCEDURE — U0003 INFECTIOUS AGENT DETECTION BY NUCLEIC ACID (DNA OR RNA); SEVERE ACUTE RESPIRATORY SYNDROME CORONAVIRUS 2 (SARS-COV-2) (CORONAVIRUS DISEASE [COVID-19]), AMPLIFIED PROBE TECHNIQUE, MAKING USE OF HIGH THROUGHPUT TECHNOLOGIES AS DESCRIBED BY CMS-2020-01-R: HCPCS

## 2021-01-23 LAB
LABORATORY COMMENT REPORT: NORMAL
SARS-COV-2 RNA RESP QL NAA+PROBE: NEGATIVE
SPECIMEN SOURCE: NORMAL

## 2021-01-25 NOTE — PROGRESS NOTES
[facility] - CardioMEMS Reading Review    January 25, 2021   CardioMEMS reviewed and routed to Austin Miguel NP  Current diuretic: Bumex 4 mg daily  Recent plan of care changes: none. PAD out of range 2 days over weekend, now back in range today.     Current Threshold parameters:       Today's Waveform:       Readings:           RHC Date Wedge Pressure MEMS PAD during cath  (average of the 3 values)     7/1/2019 w/MEMS implant     20 mmHg   18 mmHg           Rosina Mays RN

## 2021-01-26 ENCOUNTER — ALLIED HEALTH/NURSE VISIT (OUTPATIENT)
Dept: CARDIOLOGY | Facility: CLINIC | Age: 76
End: 2021-01-26
Attending: NURSE PRACTITIONER
Payer: MEDICARE

## 2021-01-26 ENCOUNTER — ANCILLARY PROCEDURE (OUTPATIENT)
Dept: RADIOLOGY | Facility: AMBULATORY SURGERY CENTER | Age: 76
End: 2021-01-26
Payer: MEDICARE

## 2021-01-26 ENCOUNTER — HOSPITAL ENCOUNTER (OUTPATIENT)
Facility: AMBULATORY SURGERY CENTER | Age: 76
Discharge: HOME | End: 2021-01-26
Payer: MEDICARE

## 2021-01-26 VITALS
SYSTOLIC BLOOD PRESSURE: 185 MMHG | DIASTOLIC BLOOD PRESSURE: 73 MMHG | WEIGHT: 293 LBS | BODY MASS INDEX: 47.09 KG/M2 | OXYGEN SATURATION: 96 % | RESPIRATION RATE: 16 BRPM | HEART RATE: 59 BPM | TEMPERATURE: 97.7 F | HEIGHT: 66 IN

## 2021-01-26 DIAGNOSIS — R52 PAIN: ICD-10-CM

## 2021-01-26 DIAGNOSIS — M47.819 FACET ARTHROPATHY: ICD-10-CM

## 2021-01-26 DIAGNOSIS — I50.32 CHRONIC DIASTOLIC HEART FAILURE (H): Primary | ICD-10-CM

## 2021-01-26 LAB — GLUCOSE SERPL-MCNC: 106 MG/DL (ref 70–99)

## 2021-01-26 PROCEDURE — 93264 REM MNTR WRLS P-ART PRS SNR: CPT | Performed by: NURSE PRACTITIONER

## 2021-01-26 PROCEDURE — 64493 INJ PARAVERT F JNT L/S 1 LEV: CPT | Mod: RT

## 2021-01-26 RX ORDER — BUPIVACAINE HYDROCHLORIDE 2.5 MG/ML
INJECTION, SOLUTION INFILTRATION; PERINEURAL PRN
Status: DISCONTINUED | OUTPATIENT
Start: 2021-01-26 | End: 2021-01-26 | Stop reason: HOSPADM

## 2021-01-26 ASSESSMENT — MIFFLIN-ST. JEOR: SCORE: 1936.51

## 2021-01-26 NOTE — PROGRESS NOTES
Remote monitoring of Pulmonary Artery Pressures via CardioMEMS device reviewed at least weekly and as needed with CORE nursing. Diuretics adjusted accordingly.    Noted now back in range.    Billing cycle 12/27/20-  1/25/21      Austin CORNEJO

## 2021-01-26 NOTE — DISCHARGE INSTRUCTIONS
Home Care Instructions after a Medial Branch Block      In a medial branch block, a local anesthetic (numbing medicine) is injected near the medial branch nerve. This stops the transmission of pain signals from the facet joint. If this reduces your pain and improves your mobility, it may tell the doctor which facet joint is causing the pain. This procedure is a diagnostic procedure and is typically short lasting. With this injection, a steroid to increase the longevity of the blocks effect may or may not be used.    Activity  -You may resume most normal activity levels with the exception of strenuous activity. It is important for us to know if your pain with normal activity is relieved after this injection.  -DO NOT shower for 24 hours  -DO NOT remove bandaid for 24 hours    Pain  -You may experience soreness at the injection site for one or two days  -You may use an ice pack for 20 minutes every 2 hours for the first 24 hours  -You may use a heating pad after the first 24 hours  -You may use Tylenol (acetaminophen) every 4 hours or other pain medicines as     directed by your physician    You may experience numbness radiating into your legs or arms (depending on the procedure location). This numbness may last several hours. Until sensation returns to normal; please use caution in walking, climbing stairs, and stepping out of your vehicle, etc.    DID YOU RECEIVE SEDATION TODAY?  No    If you received sedation please follow these additional safety measures.  Sedation medicine, if given, may remain active for many hours. It is important for the next 24 hours that you do not:  -Drive a car  -Operate machines or power tools  -Consume alcohol, including beer  -Sign any important papers or legal documents    DID YOU RECEIVE STEROIDS TODAY?      Common side effects of steroids:  Not everyone will experience corticosteroid side effects. If side effects are experienced, they will gradually subside in the 7-10 day period  following an injection. Most common side effects include:  -Flushed face and/or chest  -Feeling of warmth, particularly in the face but could be an overall feeling of warmth  -Increased blood sugar in diabetic patients  -Menstrual irregularities my occur. If taking hormone-based birth control an alternate method of birth control is recommended  -Sleep disturbances and/or mood swings are possible  -Leg cramps      PLEASE KEEP TRACK OF YOUR SYMPTOMS AND NOTE YOUR IMPROVEMENT FOR YOUR DOCTOR.       Fill out the pain diary and fax ([phone]) it back to us. This cues us to call the patient to follow up with scheduling the next procedure. They do not need to call us they have faxed the Pain Diary.     If they do not have access to a fax machine, they can attach it to [email or web address] and we will follow up with them. They do not need to call us.     If they cannot fax or [employer], then call our call center and request to speak with a nurse for follow up after a procedure [phone].    Please contact us if you have:  -Severe pain  -Fever more than 101.5 degrees Fahrenheit  -Signs of infection at the injection site (redness, swelling, or drainage)    If you have questions, please contact our office at [phone] between the hours of 7:00 am and 3:00 pm Monday through Friday. After office hours you can contact the on call provider by dialing [phone]. If you need immediate attention, we recommend that you go to a hospital emergency room or dial [phone].  If you have scheduling questions please call [phone].

## 2021-01-27 ENCOUNTER — ALLIED HEALTH/NURSE VISIT (OUTPATIENT)
Dept: BEHAVIORAL HEALTH | Facility: CLINIC | Age: 76
End: 2021-01-27
Payer: COMMERCIAL

## 2021-01-27 VITALS
DIASTOLIC BLOOD PRESSURE: 60 MMHG | TEMPERATURE: 97.6 F | RESPIRATION RATE: 16 BRPM | SYSTOLIC BLOOD PRESSURE: 126 MMHG | HEART RATE: 58 BPM | OXYGEN SATURATION: 95 %

## 2021-01-27 DIAGNOSIS — N39.46 MIXED INCONTINENCE: Primary | ICD-10-CM

## 2021-01-27 PROCEDURE — 99207 PR COMMUNITY PARAMEDIC - PATIENT NOT BILLABLE: CPT

## 2021-01-27 NOTE — PROGRESS NOTES
Community Paramedics Follow-up Visit  January 27, 2021  TIME: 1600    Mariel Ribeiro is a 75 year old female being seen at home for a follow-up visit.    Present at appointment:           Chief Complaint   Patient presents with     Outreach       B[place]er Utilization:      Utilization    Last refreshed: 1/27/2021  7:57 AM: Hospital Admissions 0           Last refreshed: 1/27/2021  7:57 AM: ED Visits 1           Last refreshed: 1/27/2021  7:57 AM: No Show Count (past year) 2              Current as of: 1/27/2021  7:57 AM              /60   Pulse 58   Temp 97.6  F (36.4  C)   Resp 16   SpO2 95%     Clinical Concerns:  Current Medical Concerns:  Incontinence, back pain  Current Behavioral Concerns: memory loss    Education Provided to patient: no   Medication set up? No  Pill Box issued: No  Scale issued: No  Health Maintenance Reviewed:      Clinical Pathway: None    No  Face to Face in Home / Community      Review of Symptoms/PE    Skin: negative  Eyes: glasses  Ears/Nose/Throat: negative  Respiratory: Dyspnea on exertion-   Cardiovascular: negative  Gastrointestinal: negative  Genitourinary: nocturia  Musculoskeletal: negative and back pain  Neurologic: numbness or tingling of hands, numbness or tingling of feet and memory problems  Psychiatric: negative    Pain Management::                 Plan:     Time spent with patient: 45    The patient meets one or more of the following criteria:  * Requires services to prevent readmission to a nursing home or hospital    Acute concern/Follow-up recommendations: Continue tx plan.    Next CP visit scheduled: TBD    Issues for Provider to follow up on: Mariel reports very very happy after talking to Urology and reports improvement with having to go to the bathroom from 4 times a night to 1 or 2.  She received a lumbar medidial branch block yesterday and is still unsure if she has any relief.          Provider follow up visit needed: Multiple upcoming

## 2021-01-29 ENCOUNTER — PATIENT OUTREACH (OUTPATIENT)
Dept: CARE COORDINATION | Facility: CLINIC | Age: 76
End: 2021-01-29

## 2021-01-29 NOTE — PROGRESS NOTES
"Clinic Care Coordination Contact  Community Health Worker Follow Up    Goals:   Goals Addressed as of 1/29/2021 at 3:49 PM        Patient Stated      Other (pt-stated)     Added 12/11/20 by Evy Paulino MA     Goal Statement: I would like to attend diabetic education in the next month.  Date Goal set: 12/11/2020   Barriers: Understanding diet and more  Strengths: open to scheduling  Date to Achieve By: 1/11/2021  Patient expressed understanding of goal: yes    Action steps to achieve this goal:  1. I will call diabetic ed to schedule an appointment in the next month. (completed)  2. I will attend my appointment once scheduled. (completed)  3. I will ask the CHW to reach out to the diabetic educator and follow up with me regarding questions I have about icons on my Freestyle Kaylah.    Updated: 1/29/21            Intervention and Education during outreach:     Called pt to introduce myself and explain my role. Noted previous CHW's last outreach, where pt set a new goal around connecting with a diabetic educator. I asked if pt was able to schedule and attend the appointment, and pt said yes. She said the last time they met was January 7th. Pt said she started using a new sensor, the Freestyle Kaylah, and has some questions about \"icons that come up on the screen.\" I asked pt to tell me more, and she said she will see \" the number 68 with a blood drop and a magnifying glass by it.\" Pt said she doesn't know if that means she needs to do something, if her sugar is high or if her sugar is low. I asked pt if she has a manual that came with the sensor that might explain what the different icons mean, and pt said \"I have vision issues and can't read small text.\" I offered to reach out to the diabetic educator that pt met with and see if she could follow up next week by phone to answer pt's questions. Pt agreed and said that would be helpful. She said afternoons are usually the best time to reach her. Updated goal.    Noted " "pt's upcoming appointments. Pt said she sees \"10 different doctors\" and that it feels \"like a full time job managing everything.\" I explained how CC can assist pt with understanding her health conditions, managing them and following up on her providers' recommendations. I explained my role and how I work with the CC RN on my team. I let pt know that I can always schedule a time for pt to speak with CC RN if needed, and she thanked me for the information.    Asked if there were any other questions or concerns I could help with today, and pt declined. I offered to give pt my contact information, and she declined. Pt said \"it would just be easier if you call me.\" I let her know I will follow up with her again by phone next month to check in. She agreed and thanked me for the call.    CHW Plan: CHW will message diabetic educator to request follow up with pt next week. CHW will follow up with pt in one month.      "

## 2021-02-01 ENCOUNTER — MYC MEDICAL ADVICE (OUTPATIENT)
Dept: FAMILY MEDICINE | Facility: CLINIC | Age: 76
End: 2021-02-01

## 2021-02-01 ENCOUNTER — PATIENT OUTREACH (OUTPATIENT)
Dept: CARE COORDINATION | Facility: CLINIC | Age: 76
End: 2021-02-01

## 2021-02-01 ASSESSMENT — ACTIVITIES OF DAILY LIVING (ADL): DEPENDENT_IADLS:: SHOPPING

## 2021-02-01 NOTE — PROGRESS NOTES
[facility] - CardioMEMS Reading Review    February 1, 2021   CardioMEMS reviewed. PAD within goal range.   Current diuretic: Bumex 4 mg daily  Recent plan of care changes: none    Current Threshold parameters:       Today's Waveform:       Readings:           RHC Date Wedge Pressure MEMS PAD during cath  (average of the 3 values)     7/1/2019 w/MEMS implant     20 mmHg   18 mmHg           Rosina Mays RN

## 2021-02-01 NOTE — PROGRESS NOTES
Situation: Patient chart reviewed by care coordinator.     Background: Care Coordination following patient to assist with goal as below.     Assessment: Per chart review, patient outreach completed by CC CHW on 1/29/2021.  Patient is actively working to accomplish goal.   Patient's goal remains appropriate and relevant at this time.    Goals       Other (pt-stated)      Goal Statement: I would like to attend diabetic education in the next month.  Date Goal set: 12/11/2020   Barriers: Understanding diet and more  Strengths: open to scheduling  Date to Achieve By: 1/11/2021  Patient expressed understanding of goal: yes    Action steps to achieve this goal:  1. I will call diabetic ed to schedule an appointment in the next month. (completed)  2. I will attend my appointment once scheduled. (completed)  3. I will ask the CHW to reach out to the diabetic educator and follow up with me regarding questions I have about icons on my Freestyle Kaylah.    Updated: 1/29/21             Plan/Recommendations: The patient will continue working with Care Coordination to achieve goal as above.  CC RN will review patient chart again in approximately 6 weeks to assess patient status and goal progress with outreach to patient as indicated.  [facility]   Senia Durán RN, Care Coordinator   [facility]   E-mail [email or web address]   [phone]

## 2021-02-08 NOTE — PROGRESS NOTES
Outcome for 02/09/21 11:25 AM :Patient is sharing data via clinic device website and patient has been instructed to update data on website. Find login information by using .DMTech  uploaded to kaylah       Mariel Ribeiro  is being evaluated via a billable video visit.      How would you like to obtain your AVS? Cobra Stylethart  For the video visit, send the invitation by: Shareighthart  Will anyone else be joining your video visit? [facility]  Endocrinology  Muriel Will PA-C  02/09/2021      Chief Complaint:   Diabetes    History of Present Illness:   Mariel Ribeiro is a 75 year old female with a history of type II diabetes mellitus, kidney failure, TIA, CHF, CAD status post CABG, COPD, thyroid cancer status post thyroidectomy and resultant hypothyroidism, and hypertension who presents for  follow up of her diabetes.     She was diagnosed with type 2 diabetes mellitus in 2007. Initially managed with oral Metformin, Januvia, and Glimepiride, all have been discontinued due to declining renal fucntion. She was reasonably well managed with NPH 28 units bid and Prandin with meals when last seen 10/31/2019.  I last saw her as a hospital follow-up in December 2019 with BG above goal and advised increasing Lantus to 31 and if needed 33 units daily and continuing prandin with meals and advised she see CDE.    She has continued to follow with Radha Llanos CDE as well as cardiology, behavioral health, her FP, urology and recently underwent lumbar medial nerve branch block for pain.      Her house mate, Odalis, has sent me Kaylah report via My Chart.  I also am deirdre to access online.      Today:  She feels when she monitors her sugar better with the Kaylah it is much easier to manage, as she can change her behavior.  She did have an eye exam over one year ago, no diabetic retinopathy in her eyes, but she got glasses that don't work.  She had them redone twice and still not functioning.  She would like to see a different eye  "provider.    Has been working on diet.  She and house mate eating just twice daily and that helps her eat less; the meals are about 10 hours apart, 10 am and 7-8pm once Odalis finishes work which is very busy for her.  Mariel has started doing some online videos for exercise.  She rarely leaves the house for fear of COVID infection. She denies any difficulty with low BG and rarely has BG high enough to worry her. She has seen on occasion and she or Odalis able to relate it to recent dietary indiscretion which she then avoids.  She used to find walking helpful, but now is doing online activity.  She is glad that her weight is coming down.      She is anxious that everyone is telling her she has kidney disease, but she is concerned what to do about it.      She is worried about her cognition as is Odalis.  She states this has been going on for some time, but notable. She is able to get breakfast without assistance, but often loses track of what she is doing. Odalis has to prepare more advanced meals, always makes dinner.  She feels she is \"losing track\" of what she is doing and even what she is saying more frequently.  She states she mentioned at her well exam and felt provider agreed there might be a problem, but didn't give her ideas what to do about it. She may have suggested a specialist.  She would like to see a specialist.      Current DM therapy:  Lantus 31 units daily  Prandin 1 mg twice daily with each of her meals.        Blood Glucose Monitoring:      Marked improvement BG at goal.      Diabetes monitoring and complications:  CAD: Yes  Last eye exam results: mild diabetic retinopathy (12/12/2018)   Microalbuminuria: 31.53 (5/22/2019)  Neuropathy: Yes  HTN: Yes  On Statin: Yes  On Aspirin: Yes  Depression: Yes  Erectile dysfunction: N/A      Review of Systems:   Pertinent items are noted in HPI.  All other systems are negative.    Active Medications:     Current Outpatient Medications:      acetaminophen " (TYLENOL) 325 MG tablet, Take 650 mg by mouth every 4 hours as needed for mild pain Take 2 tablets by mouth every 4 hours as needed for mild pain, Disp: 100 tablet, Rfl:      albuterol (PROAIR HFA/PROVENTIL HFA/VENTOLIN HFA) 108 (90 Base) MCG/ACT inhaler, Inhale 2 puffs into the lungs every 6 hours, Disp: 3 Inhaler, Rfl: 0     anastrozole (ARIMIDEX) 1 MG tablet, Take 1 tablet (1 mg) by mouth daily, Disp: 90 tablet, Rfl: 3     aspirin (ASA) 81 MG EC tablet, Take 1 tablet (81 mg) by mouth daily, Disp: , Rfl:      atorvastatin (LIPITOR) 40 MG tablet, TAKE 1 TABLET(40 MG) BY MOUTH DAILY, Disp: 90 tablet, Rfl: 3     blood glucose (ONETOUCH ULTRA) test strip, Use to test blood sugar four times daily or as directed., Disp: 3 Box, Rfl: 3     bumetanide (BUMEX) 1 MG tablet, Take 4 mg in the morning, 2 mg in the afternoon for 3 days, then decrease to 4 mg daily., Disp: 280 tablet, Rfl: 3     buPROPion (WELLBUTRIN XL) 150 MG 24 hr tablet, Take 1 tablet (150 mg) by mouth every morning, Disp: 30 tablet, Rfl: 3     capsaicin (ZOSTRIX) 0.025 % external cream, Apply 1 g topically 3 times daily For neuropathic pain., Disp: 60 g, Rfl: 1     Continuous Blood Gluc  (FREESTYLE MARTY 14 DAY READER) JEZ, 1 Units 4 times daily (before meals and nightly), Disp: 1 Device, Rfl: 0     Continuous Blood Gluc Sensor (FREESTYLE MARTY 14 DAY SENSOR) [facility], Place new sensor to back of arm q14 days to monitor blood sugars, Disp: 6 each, Rfl: 3     EPINEPHrine (ANY BX GENERIC EQUIV) 0.3 MG/0.3ML injection 2-pack, Inject 0.3 mLs (0.3 mg) into the muscle once as needed for anaphylaxis, Disp: 0.6 mL, Rfl: 3     escitalopram (LEXAPRO) 20 MG tablet, TAKE 1 TABLET(20 MG) BY MOUTH EVERY MORNING, Disp: 90 tablet, Rfl: 3     ferrous gluconate (FERGON) 324 (38 Fe) MG tablet, TAKE 1 TABLET BY MOUTH EVERY MONDAY, WEDNESDAY, AND FRIDAY MORNING, Disp: 36 tablet, Rfl: 3     folic acid (FOLVITE) 1 MG tablet, Take 2 tablets (2 mg) by mouth daily, Disp: , Rfl:  "     guaiFENesin (MUCINEX) 600 MG 12 hr tablet, Take 1 tablet (600 mg) by mouth 2 times daily, Disp: , Rfl:      insulin glargine (LANTUS SOLOSTAR) 100 UNIT/ML pen, Inject  31 units  daily subcutaneously.  Call  if blood sugar <70, often >200., Disp: 15 mL, Rfl: 1     LIFESCAN FINEPOINT LANCETS MISC, Use to test blood sugars 2 times daily or as directed., Disp: 100 each, Rfl: prn     losartan (COZAAR) 50 MG tablet, Take 0.5 tablets (25 mg) by mouth daily, Disp: 45 tablet, Rfl: 1     metoprolol succinate ER (TOPROL-XL) 25 MG 24 hr tablet, Please take 25 mg in the morning and 50 mg at night., Disp: 120 tablet, Rfl: 11     miconazole (MICATIN/MICRO GUARD) 2 % external powder, Apply topically as needed for itching or other Redness in bilateral groin and katharine clef, Disp: 43 g, Rfl: 0     niacin ER (NIASPAN) 500 MG CR tablet, TAKE 1 TABLET(500 MG) BY MOUTH TWICE DAILY, Disp: 180 tablet, Rfl: 3     nitroGLYcerin (NITROSTAT) 0.4 MG sublingual tablet, For chest pain place 1 tablet under the tongue every 5 minutes for 3 doses. If symptoms persist 5 minutes after 1st dose call 911., Disp: 25 tablet, Rfl: 0     ONE TOUCH ULTRA (DEVICES) MISC, test blood sugar BID, Disp: 1, Rfl: 0     potassium chloride ER (KLOR-CON M) 10 MEQ CR tablet, Take 2 tablets (20 mEq) by mouth 2 times daily, Disp: 120 tablet, Rfl: 8     pregabalin (LYRICA) 100 MG capsule, Take 1 capsule (100 mg) by mouth 2 times daily, Disp: 180 capsule, Rfl: 3     repaglinide (PRANDIN) 1 MG tablet, Take 1 tablet (1 mg) by mouth 3 times daily (before meals), Disp: 90 tablet, Rfl: 3     Skin Protectants, Misc. ([facility] TEXTILE 10\"X144\") SHEE, Externally apply 1 Box topically daily Apply to areas of intertrigo prn, Disp: 1 each, Rfl: 3     SYNTHROID 150 MCG tablet, TAKE 1 TABLET(150 MCG) BY MOUTH DAILY, Disp: 90 tablet, Rfl: 3     tolterodine ER (DETROL LA) 2 MG 24 hr capsule, TAKE 2 CAPSULES(4 MG) BY MOUTH DAILY, Disp: 180 capsule, Rfl: 3     traMADol (ULTRAM) 50 MG " tablet, TAKE 1 TABLET(50 MG) BY MOUTH EVERY 6 HOURS AS NEEDED FOR BREAKTHROUGH PAIN OR SEVERE PAIN, Disp: 60 tablet, Rfl: 0     traZODone (DESYREL) 50 MG tablet, TAKE 3 TABLETS(150 MG) BY MOUTH AT BEDTIME, Disp: 270 tablet, Rfl: 3     vitamin D3 (CHOLECALCIFEROL) 75447 units (250 mcg) capsule, Take 1 capsule (10,000 Units) by mouth daily, Disp: , Rfl:   No current facility-administered medications for this visit.     Facility-Administered Medications Ordered in Other Visits:      sodium chloride (PF) 0.9% PF flush 10 mL, 10 mL, Intracatheter, Once, Starla Saez NP      Allergies:   Contrast dye, Fish allergy, Iodine, Oxycodone, Tree nuts [nuts], Bactrim, Betadine [povidone iodine], Combivent, Dulaglutide, Fish, Lisinopril, Penicillins, Allopurinol, Latex, and Wool fiber      Past Medical History:  Anxiety  COPD  Coronary atherosclerosis  Depression  GERD  Gout  Thyroid cancer  Insomnia  Lymphedema  Migraines  Mononeuritis  Obstructive sleep apnea  Hyperlipidemia  Kidney failure  Type 2 diabetes mellitus  Umbilical hernia  Hypertension  Hypothyroidism  Vitamin D deficiency  Hyperlipidemia  Diastolic CHF  Osteoarthritis  TIA  Spinal stenosis: Lumbar  Carotid artery disease     Past Surgical History:  CABG x3  Coronary stent placement x2  Knee arthroplasty: Bilateral  Cataract removal: Bilateral  Colonoscopy  Heart catheterization x2  Dilation and curettage  Knee arthroscopy: Bilateral  Laparoscopic cholecystectomy  Denture implant  Thyroidectomy  Bilateral thumb surgery: Procedure unlisted  Right breast biopsy  Rotator cuff repair: Left    Family History:   CAD - mother, father  Diabetes - mother, sister  Hypertension - mother, father, sister, brother  Clotting disorder - mother  DVT - mother  CVA - mother  Glaucoma - mother  Macular degeneration - mother  Cerebrovascular disease - father  Alcohol abuse - father, sister  Liver cancer - brother  Pancreatic cancer - brother  Brain cancer - brother  Heart disease  "- sister  Drug abuse - brother  CAD - brother x2, sister x2  Kidney disease - sister     Social History:   She lives with her roommate Odalis and her dog Rimma. Odalis is an  and still working form home.  They eat between 8 and 10 pm.    Tobacco Use: former smoker, 27 years, quit 1989  Alcohol Use: none  Drug Use: none  PCP: Amee Connell      Physical Exam:   There were no vitals taken for this visit.     Wt Readings from Last 10 Encounters:   01/26/21 142 kg (313 lb)   01/12/21 142 kg (313 lb)   12/23/20 144.5 kg (318 lb 9.6 oz)   11/24/20 141.2 kg (311 lb 3.2 oz)   11/17/20 144.7 kg (319 lb)   10/09/20 144.7 kg (319 lb)   09/29/20 146.1 kg (322 lb 3.2 oz)   09/15/20 145.6 kg (321 lb)   09/01/20 145.6 kg (321 lb)   08/18/20 138.9 kg (306 lb 3.2 oz)      General: Pleasant, well nourished and hydrated obese female in NAD with dog Kristina on her lap throughout the visit.   Psych:  Mood is \"good,\" affect is appropriate.  Thought form and content are fluid and coherent.    HEENT: Eyes and sclera are clear. Extraocular movements are grossly intact without proptosis.  Nares are patent, mucous membranes moist.  Neck: No masses or JVD are noted.    Resp: Easy and unlabored breathing.   Neuro: Alert and oriented, communicating clearly. She is able to recall her diabetic medications without difficulty and discuss her BG numbers in general, what her goals for them each day are.    Ext: no swelling or edema     Data:  Lab Results   Component Value Date     12/22/2020    POTASSIUM 3.9 12/22/2020    CHLORIDE 108 12/22/2020    CO2 28 12/22/2020    ANIONGAP 5 12/22/2020     (A) 01/26/2021    BUN 33 (H) 12/22/2020    CR 1.52 (H) 12/22/2020    ANTOINETTE 10.0 12/22/2020     Lab Results   Component Value Date    GFRESTIMATED 33 (L) 12/22/2020    GFRESTIMATED 32 (L) 10/01/2020    GFRESTIMATED 28 (L) 06/24/2020    GFRESTBLACK 39 (L) 12/22/2020    GFRESTBLACK 37 (L) 10/01/2020    GFRESTBLACK 33 (L) 06/24/2020      Lab " Results   Component Value Date    MICROL 31 05/22/2019    UMALCR 31.53 (H) 05/22/2019        Lab Results   Component Value Date    A1C 6.9 (H) 06/24/2020    A1C 6.5 (H) 04/30/2019    A1C 5.2 09/10/2018    A1C 9.3 (H) 07/01/2018    A1C 8.2 (H) 03/19/2018    HEMOGLOBINA1 7.2 (A) 10/31/2019    HEMOGLOBINA1 6.5 (A) 04/03/2019    HEMOGLOBINA1 5.5 11/29/2018     No results found for: CPEPT, GADAB, ISCAB    Lab Results   Component Value Date    CHOL 170 03/13/2019    CHOL 203 (H) 03/19/2018    TRIG 118 03/13/2019    TRIG 212 (H) 03/19/2018    HDL 71 03/13/2019    HDL 50 03/19/2018    LDL 76 03/13/2019     (H) 03/19/2018    NHDL 99 03/13/2019    NHDL 153 (H) 03/19/2018       Assessment and Plan:  Type 2 diabetes mellitus with stage 3 chronic kidney disease, with long-term current use of insulin (H)    Mariel is a 75 year old female with type II diabetes complicated by TIA, CHF, CAD status post CABG, COPD, thyroid cancer status post thyroidectomy and resultant hypothyroidism, obesity, depression, chronic pain and hypertension.    Her diabetes is well -controlled with diet, some physical activity, Lantus 31 units daily, and Prandin 1 mg twice daily with each meal.    Discussed starting Empagliflozin instead of Prandin, she is reluctant regarding side effect profile.      Cognitive decline/memory concerns:  Mariel reports that she is increasingly dependent on Odalis for her ADLs and worried.  I discussed meeting with her primary care provider about this, but she would like to proactive and get and diagnosis about what is happening and support to address it.  I placed referral to neurology for cognitive evaluation.    It would be helpful to know if she would be deirdre to hold Empagliflozin if ill with potential for dehydration.      Obesity:  Weight is coming down with her two daily meals, no snacking diet and physical activity.  Heartily encouraged in this.        HTN:  BP controlled at rest at recent exam.  Continue current  therapy.      CKD:  stable stage 3, with GFR stable low 30s, Cr rather stable 1.5 - 1.9, previously limited microalbuminuria.  Recommend update.  Continue Cozaar.  Discussed possibly starting Empagliflozin, but she is reluctant as blood sugar well controlled.      She would like to establish care with nephrology though reassured her disease is stable.  She would like their opinion regarding initiating Empagliflozin.  I do tend to think warranted, but would like to discuss with her and Odalis to assure can be done safely.  Again Prandin could be withdrawn if low BG results or preemptively with initiation of Empagliflozin and added back in later if needed.       DM Complications-     Lifestyle modification focusing on reduction of saturated fat and trans fat; increase of dietary n-3 fatty acids, viscous fiber, and plant stanols/sterols intake; and increased physical activity recommended to improve the lipid profile and reduce the risk of developing atherosclerotic cardiovascular disease.     Retinopathy:  No. Sent ophthalmology referral.   Nephropathy:  Yes, stable CKD 3.  Consider Empag start.   Neuropathy: No.    Feet: OK, no ulcers or lesions.   Lipids: Has CAD and is taking a statin.          Follow-up: Return in 3-6 months   60 minutes in preparation for visit reviewing chart, labs and documentation, visiting with patient gathering history and in exam, education and counseling, as well as coordination of care, further chart review and documentation following visit on this date of service and as alluded to documented above.        It is my privilege to be involved in the care of the above patient.     Muriel Will PA-C, MPAS  [facility]  Diabetes, Endocrinology, and Metabolism  [phone] Appointments/Nurse  [phone] Fax  [phone] pager  [phone] nurse line

## 2021-02-08 NOTE — PROGRESS NOTES
[facility] - CardioMEMS Reading Review    February 8, 2021   CardioMEMS reviewed. PAD in goal range.   Current diuretic: Bumex 4 mg daily.   Recent plan of care changes:     Current Threshold parameters:       Today's Waveform:       Readings:       RHC Date Wedge Pressure MEMS PAD during cath  (average of the 3 values)     7/1/2019 w/MEMS implant     20 mmHg   18 mmHg           Rosina Mays RN

## 2021-02-09 ENCOUNTER — VIRTUAL VISIT (OUTPATIENT)
Dept: ENDOCRINOLOGY | Facility: CLINIC | Age: 76
End: 2021-02-09
Payer: MEDICARE

## 2021-02-09 DIAGNOSIS — R41.3 MEMORY LOSS: ICD-10-CM

## 2021-02-09 DIAGNOSIS — Z79.4 TYPE 2 DIABETES MELLITUS WITH STAGE 3B CHRONIC KIDNEY DISEASE, WITH LONG-TERM CURRENT USE OF INSULIN (H): Primary | ICD-10-CM

## 2021-02-09 DIAGNOSIS — E11.22 TYPE 2 DIABETES MELLITUS WITH STAGE 3B CHRONIC KIDNEY DISEASE, WITH LONG-TERM CURRENT USE OF INSULIN (H): Primary | ICD-10-CM

## 2021-02-09 DIAGNOSIS — I65.23 BILATERAL CAROTID ARTERY OCCLUSION: ICD-10-CM

## 2021-02-09 DIAGNOSIS — I20.89 ANGINA AT REST (H): ICD-10-CM

## 2021-02-09 DIAGNOSIS — N18.32 TYPE 2 DIABETES MELLITUS WITH STAGE 3B CHRONIC KIDNEY DISEASE, WITH LONG-TERM CURRENT USE OF INSULIN (H): Primary | ICD-10-CM

## 2021-02-09 DIAGNOSIS — N18.4 CKD (CHRONIC KIDNEY DISEASE) STAGE 4, GFR 15-29 ML/MIN (H): ICD-10-CM

## 2021-02-09 DIAGNOSIS — F32.0 MILD MAJOR DEPRESSION (H): ICD-10-CM

## 2021-02-09 DIAGNOSIS — J41.0 SIMPLE CHRONIC BRONCHITIS (H): ICD-10-CM

## 2021-02-09 DIAGNOSIS — I50.32 CHRONIC DIASTOLIC HEART FAILURE (H): ICD-10-CM

## 2021-02-09 DIAGNOSIS — E66.01 MORBID OBESITY (H): ICD-10-CM

## 2021-02-09 PROCEDURE — 99215 OFFICE O/P EST HI 40 MIN: CPT | Mod: 95 | Performed by: PHYSICIAN ASSISTANT

## 2021-02-09 NOTE — PATIENT INSTRUCTIONS
Dear Mariel,    It is good to see you (and Kristina!)  I am glad to see your blood sugars look excellent and to hear about your healthy diet.      I hope that you have better luck with optometry at [facility].    I have placed referrals to both  doctor for diabetic eye exam, kidney specialist and neurology to evaluate your cognition.      Please keep up your good work staying active while quarantining.     Please let us know of anything you might need before or next visit or if for some reason you feel you might need to be seen sooner.    My best wishes,    Muriel Will PA-C, [facility]  [facility]  Diabetes, Endocrinology, and Metabolism  [phone] Appointments/Nurse  [phone] Fax  [phone] URGENTafter hours/weekend Endocrinologist on call    It is good to speak with you today!    Labs, and clinics are still open up for select services.  You do need to call ahead to learn the procedures to get your flu shot, eye exam and labs, but please do consider bringing all of these recommend cares up to date in the coming months.  Please contact us to schedule at any of our [place] lab locations  Call [phone] ([phone]), select option 1.    For [facility] lab at [address] [place] [address], call : [phone].    Our clinic has limited availability to be seen in person and I encourage you to schedule with myself, nutrition our diabetic educators, or your primary care provider for a foot exam together with any other needed in person care when you feel comfortable doing so.  Our diabetic foot providers, Dr. Louie and Evin are also available on a limited basis to see patients with pedal ulcers or other concerns.       Please work to meet recommendations for physical activity which is 30 minutes aerobic activity at 6 days each week and resistance training 3 times /week.  Free online videos can be  a good way to stay active at home when we cannot get outside.    Also many heart healthy food ideas are  available at:  https://www.diabetesfoodhub.org/    Please let us know any time you would like to schedule with nutrition or diabetes educator; most insurers cover 2 - 6 hours of education annually and I see better outcomes in patients who take advantage of this.  Knowledge is power of course!

## 2021-02-09 NOTE — LETTER
2/9/2021       RE: Mariel Ribeiro  [address] [place] [address]     Dear Colleague,    Thank you for referring your patient, Mariel Ribeiro, to the [facility] [place] at [facility]. Please see a copy of my visit note below.    Outcome for 02/09/21 11:25 AM :Patient is sharing data via clinic device website and patient has been instructed to update data on website. Find login information by using [email or web address]  uploaded to kaylah       Mariel Ribeiro  is being evaluated via a billable video visit.      How would you like to obtain your AVS? [employer]  For the video visit, send the invitation by: [employer]  Will anyone else be joining your video visit? No      [facility]  Endocrinology  Muriel Will PA-C  02/09/2021      Chief Complaint:   Diabetes    History of Present Illness:   Mariel Ribeiro is a 75 year old female with a history of type II diabetes mellitus, kidney failure, TIA, CHF, CAD status post CABG, COPD, thyroid cancer status post thyroidectomy and resultant hypothyroidism, and hypertension who presents for  follow up of her diabetes.     She was diagnosed with type 2 diabetes mellitus in 2007. Initially managed with oral Metformin, Januvia, and Glimepiride, all have been discontinued due to declining renal fucntion. She was reasonably well managed with NPH 28 units bid and Prandin with meals when last seen 10/31/2019.  I last saw her as a hospital follow-up in December 2019 with BG above goal and advised increasing Lantus to 31 and if needed 33 units daily and continuing prandin with meals and advised she see CDE.    She has continued to follow with Radha Llanos CDE as well as cardiology, behavioral health, her FP, urology and recently underwent lumbar medial nerve branch block for pain.      Her house mate, Odalis, has sent me Kaylah report via My Chart.  I also am deirdre to access online.      Today:  She feels when she  "monitors her sugar better with the Kaylah it is much easier to manage, as she can change her behavior.  She did have an eye exam over one year ago, no diabetic retinopathy in her eyes, but she got glasses that don't work.  She had them redone twice and still not functioning.  She would like to see a different eye provider.    Has been working on diet.  She and house mate eating just twice daily and that helps her eat less; the meals are about 10 hours apart, 10 am and 7-8pm once Odalis finishes work which is very busy for her.  Mariel has started doing some online videos for exercise.  She rarely leaves the house for fear of COVID infection. She denies any difficulty with low BG and rarely has BG high enough to worry her. She has seen on occasion and she or Odalis able to relate it to recent dietary indiscretion which she then avoids.  She used to find walking helpful, but now is doing online activity.  She is glad that her weight is coming down.      She is anxious that everyone is telling her she has kidney disease, but she is concerned what to do about it.      She is worried about her cognition as is Odalis.  She states this has been going on for some time, but notable. She is able to get breakfast without assistance, but often loses track of what she is doing. Odalis has to prepare more advanced meals, always makes dinner.  She feels she is \"losing track\" of what she is doing and even what she is saying more frequently.  She states she mentioned at her well exam and felt provider agreed there might be a problem, but didn't give her ideas what to do about it. She may have suggested a specialist.  She would like to see a specialist.      Current DM therapy:  Lantus 31 units daily  Prandin 1 mg twice daily with each of her meals.        Blood Glucose Monitoring:      Marked improvement BG at goal.      Diabetes monitoring and complications:  CAD: Yes  Last eye exam results: mild diabetic retinopathy (12/12/2018) "   Microalbuminuria: 31.53 (5/22/2019)  Neuropathy: Yes  HTN: Yes  On Statin: Yes  On Aspirin: Yes  Depression: Yes  Erectile dysfunction: N/A      Review of Systems:   Pertinent items are noted in HPI.  All other systems are negative.    Active Medications:     Current Outpatient Medications:      acetaminophen (TYLENOL) 325 MG tablet, Take 650 mg by mouth every 4 hours as needed for mild pain Take 2 tablets by mouth every 4 hours as needed for mild pain, Disp: 100 tablet, Rfl:      albuterol (PROAIR HFA/PROVENTIL HFA/VENTOLIN HFA) 108 (90 Base) MCG/ACT inhaler, Inhale 2 puffs into the lungs every 6 hours, Disp: 3 Inhaler, Rfl: 0     anastrozole (ARIMIDEX) 1 MG tablet, Take 1 tablet (1 mg) by mouth daily, Disp: 90 tablet, Rfl: 3     aspirin (ASA) 81 MG EC tablet, Take 1 tablet (81 mg) by mouth daily, Disp: , Rfl:      atorvastatin (LIPITOR) 40 MG tablet, TAKE 1 TABLET(40 MG) BY MOUTH DAILY, Disp: 90 tablet, Rfl: 3     blood glucose (ONETOUCH ULTRA) test strip, Use to test blood sugar four times daily or as directed., Disp: 3 Box, Rfl: 3     bumetanide (BUMEX) 1 MG tablet, Take 4 mg in the morning, 2 mg in the afternoon for 3 days, then decrease to 4 mg daily., Disp: 280 tablet, Rfl: 3     buPROPion (WELLBUTRIN XL) 150 MG 24 hr tablet, Take 1 tablet (150 mg) by mouth every morning, Disp: 30 tablet, Rfl: 3     capsaicin (ZOSTRIX) 0.025 % external cream, Apply 1 g topically 3 times daily For neuropathic pain., Disp: 60 g, Rfl: 1     Continuous Blood Gluc  (FREESTYLE MARTY 14 DAY READER) JEZ, 1 Units 4 times daily (before meals and nightly), Disp: 1 Device, Rfl: 0     Continuous Blood Gluc Sensor (FREESTYLE MARTY 14 DAY SENSOR) [facility], Place new sensor to back of arm q14 days to monitor blood sugars, Disp: 6 each, Rfl: 3     EPINEPHrine (ANY BX GENERIC EQUIV) 0.3 MG/0.3ML injection 2-pack, Inject 0.3 mLs (0.3 mg) into the muscle once as needed for anaphylaxis, Disp: 0.6 mL, Rfl: 3     escitalopram (LEXAPRO) 20 MG  "tablet, TAKE 1 TABLET(20 MG) BY MOUTH EVERY MORNING, Disp: 90 tablet, Rfl: 3     ferrous gluconate (FERGON) 324 (38 Fe) MG tablet, TAKE 1 TABLET BY MOUTH EVERY MONDAY, WEDNESDAY, AND FRIDAY MORNING, Disp: 36 tablet, Rfl: 3     folic acid (FOLVITE) 1 MG tablet, Take 2 tablets (2 mg) by mouth daily, Disp: , Rfl:      guaiFENesin (MUCINEX) 600 MG 12 hr tablet, Take 1 tablet (600 mg) by mouth 2 times daily, Disp: , Rfl:      insulin glargine (LANTUS SOLOSTAR) 100 UNIT/ML pen, Inject  31 units  daily subcutaneously.  Call  if blood sugar <70, often >200., Disp: 15 mL, Rfl: 1     Biart FINEPOINT LANCETS MISC, Use to test blood sugars 2 times daily or as directed., Disp: 100 each, Rfl: prn     losartan (COZAAR) 50 MG tablet, Take 0.5 tablets (25 mg) by mouth daily, Disp: 45 tablet, Rfl: 1     metoprolol succinate ER (TOPROL-XL) 25 MG 24 hr tablet, Please take 25 mg in the morning and 50 mg at night., Disp: 120 tablet, Rfl: 11     miconazole (MICATIN/MICRO GUARD) 2 % external powder, Apply topically as needed for itching or other Redness in bilateral groin and katharine clef, Disp: 43 g, Rfl: 0     niacin ER (NIASPAN) 500 MG CR tablet, TAKE 1 TABLET(500 MG) BY MOUTH TWICE DAILY, Disp: 180 tablet, Rfl: 3     nitroGLYcerin (NITROSTAT) 0.4 MG sublingual tablet, For chest pain place 1 tablet under the tongue every 5 minutes for 3 doses. If symptoms persist 5 minutes after 1st dose call 911., Disp: 25 tablet, Rfl: 0     ONE TOUCH ULTRA (DEVICES) MISC, test blood sugar BID, Disp: 1, Rfl: 0     potassium chloride ER (KLOR-CON M) 10 MEQ CR tablet, Take 2 tablets (20 mEq) by mouth 2 times daily, Disp: 120 tablet, Rfl: 8     pregabalin (LYRICA) 100 MG capsule, Take 1 capsule (100 mg) by mouth 2 times daily, Disp: 180 capsule, Rfl: 3     repaglinide (PRANDIN) 1 MG tablet, Take 1 tablet (1 mg) by mouth 3 times daily (before meals), Disp: 90 tablet, Rfl: 3     Skin Protectants, Misc. ([facility] AG TEXTILE 10\"X144\") SHEE, Externally apply " 1 Box topically daily Apply to areas of intertrigo prn, Disp: 1 each, Rfl: 3     SYNTHROID 150 MCG tablet, TAKE 1 TABLET(150 MCG) BY MOUTH DAILY, Disp: 90 tablet, Rfl: 3     tolterodine ER (DETROL LA) 2 MG 24 hr capsule, TAKE 2 CAPSULES(4 MG) BY MOUTH DAILY, Disp: 180 capsule, Rfl: 3     traMADol (ULTRAM) 50 MG tablet, TAKE 1 TABLET(50 MG) BY MOUTH EVERY 6 HOURS AS NEEDED FOR BREAKTHROUGH PAIN OR SEVERE PAIN, Disp: 60 tablet, Rfl: 0     traZODone (DESYREL) 50 MG tablet, TAKE 3 TABLETS(150 MG) BY MOUTH AT BEDTIME, Disp: 270 tablet, Rfl: 3     vitamin D3 (CHOLECALCIFEROL) 54952 units (250 mcg) capsule, Take 1 capsule (10,000 Units) by mouth daily, Disp: , Rfl:   No current facility-administered medications for this visit.     Facility-Administered Medications Ordered in Other Visits:      sodium chloride (PF) 0.9% PF flush 10 mL, 10 mL, Intracatheter, Once, Starla Saez, NP      Allergies:   Contrast dye, Fish allergy, Iodine, Oxycodone, Tree nuts [nuts], Bactrim, Betadine [povidone iodine], Combivent, Dulaglutide, Fish, Lisinopril, Penicillins, Allopurinol, Latex, and Wool fiber      Past Medical History:  Anxiety  COPD  Coronary atherosclerosis  Depression  GERD  Gout  Thyroid cancer  Insomnia  Lymphedema  Migraines  Mononeuritis  Obstructive sleep apnea  Hyperlipidemia  Kidney failure  Type 2 diabetes mellitus  Umbilical hernia  Hypertension  Hypothyroidism  Vitamin D deficiency  Hyperlipidemia  Diastolic CHF  Osteoarthritis  TIA  Spinal stenosis: Lumbar  Carotid artery disease     Past Surgical History:  CABG x3  Coronary stent placement x2  Knee arthroplasty: Bilateral  Cataract removal: Bilateral  Colonoscopy  Heart catheterization x2  Dilation and curettage  Knee arthroscopy: Bilateral  Laparoscopic cholecystectomy  Denture implant  Thyroidectomy  Bilateral thumb surgery: Procedure unlisted  Right breast biopsy  Rotator cuff repair: Left    Family History:   CAD - mother, father  Diabetes - mother,  "sister  Hypertension - mother, father, sister, brother  Clotting disorder - mother  DVT - mother  CVA - mother  Glaucoma - mother  Macular degeneration - mother  Cerebrovascular disease - father  Alcohol abuse - father, sister  Liver cancer - brother  Pancreatic cancer - brother  Brain cancer - brother  Heart disease - sister  Drug abuse - brother  CAD - brother x2, sister x2  Kidney disease - sister     Social History:   She lives with her roommate Odalis and her dog Rimma. Odalis is an  and still working form home.  They eat between 8 and 10 pm.    Tobacco Use: former smoker, 27 years, quit 1989  Alcohol Use: none  Drug Use: none  PCP: Amee Connell      Physical Exam:   There were no vitals taken for this visit.     Wt Readings from Last 10 Encounters:   01/26/21 142 kg (313 lb)   01/12/21 142 kg (313 lb)   12/23/20 144.5 kg (318 lb 9.6 oz)   11/24/20 141.2 kg (311 lb 3.2 oz)   11/17/20 144.7 kg (319 lb)   10/09/20 144.7 kg (319 lb)   09/29/20 146.1 kg (322 lb 3.2 oz)   09/15/20 145.6 kg (321 lb)   09/01/20 145.6 kg (321 lb)   08/18/20 138.9 kg (306 lb 3.2 oz)      General: Pleasant, well nourished and hydrated obese female in NAD with dog Kristina on her lap throughout the visit.   Psych:  Mood is \"good,\" affect is appropriate.  Thought form and content are fluid and coherent.    HEENT: Eyes and sclera are clear. Extraocular movements are grossly intact without proptosis.  Nares are patent, mucous membranes moist.  Neck: No masses or JVD are noted.    Resp: Easy and unlabored breathing.   Neuro: Alert and oriented, communicating clearly. She is able to recall her diabetic medications without difficulty and discuss her BG numbers in general, what her goals for them each day are.    Ext: no swelling or edema     Data:  Lab Results   Component Value Date     12/22/2020    POTASSIUM 3.9 12/22/2020    CHLORIDE 108 12/22/2020    CO2 28 12/22/2020    ANIONGAP 5 12/22/2020     (A) 01/26/2021    " BUN 33 (H) 12/22/2020    CR 1.52 (H) 12/22/2020    ANTOINETTE 10.0 12/22/2020     Lab Results   Component Value Date    GFRESTIMATED 33 (L) 12/22/2020    GFRESTIMATED 32 (L) 10/01/2020    GFRESTIMATED 28 (L) 06/24/2020    GFRESTBLACK 39 (L) 12/22/2020    GFRESTBLACK 37 (L) 10/01/2020    GFRESTBLACK 33 (L) 06/24/2020      Lab Results   Component Value Date    MICROL 31 05/22/2019    UMALCR 31.53 (H) 05/22/2019        Lab Results   Component Value Date    A1C 6.9 (H) 06/24/2020    A1C 6.5 (H) 04/30/2019    A1C 5.2 09/10/2018    A1C 9.3 (H) 07/01/2018    A1C 8.2 (H) 03/19/2018    HEMOGLOBINA1 7.2 (A) 10/31/2019    HEMOGLOBINA1 6.5 (A) 04/03/2019    HEMOGLOBINA1 5.5 11/29/2018     No results found for: CPEPT, GADAB, ISCAB    Lab Results   Component Value Date    CHOL 170 03/13/2019    CHOL 203 (H) 03/19/2018    TRIG 118 03/13/2019    TRIG 212 (H) 03/19/2018    HDL 71 03/13/2019    HDL 50 03/19/2018    LDL 76 03/13/2019     (H) 03/19/2018    NHDL 99 03/13/2019    NHDL 153 (H) 03/19/2018       Assessment and Plan:  Type 2 diabetes mellitus with stage 3 chronic kidney disease, with long-term current use of insulin (H)    Mariel is a 75 year old female with type II diabetes complicated by TIA, CHF, CAD status post CABG, COPD, thyroid cancer status post thyroidectomy and resultant hypothyroidism, obesity, depression, chronic pain and hypertension.    Her diabetes is well -controlled with diet, some physical activity, Lantus 31 units daily, and Prandin 1 mg twice daily with each meal.    Discussed starting Empagliflozin instead of Prandin, she is reluctant regarding side effect profile.      Cognitive decline/memory concerns:  Mariel reports that she is increasingly dependent on Odalis for her ADLs and worried.  I discussed meeting with her primary care provider about this, but she would like to proactive and get and diagnosis about what is happening and support to address it.  I placed referral to neurology for cognitive  evaluation.    It would be helpful to know if she would be deirdre to hold Empagliflozin if ill with potential for dehydration.      Obesity:  Weight is coming down with her two daily meals, no snacking diet and physical activity.  Heartily encouraged in this.        HTN:  BP controlled at rest at recent exam.  Continue current therapy.      CKD:  stable stage 3, with GFR stable low 30s, Cr rather stable 1.5 - 1.9, previously limited microalbuminuria.  Recommend update.  Continue Cozaar.  Discussed possibly starting Empagliflozin, but she is reluctant as blood sugar well controlled.      She would like to establish care with nephrology though reassured her disease is stable.  She would like their opinion regarding initiating Empagliflozin.  I do tend to think warranted, but would like to discuss with her and Odalis to assure can be done safely.  Again Prandin could be withdrawn if low BG results or preemptively with initiation of Empagliflozin and added back in later if needed.       DM Complications-     Lifestyle modification focusing on reduction of saturated fat and trans fat; increase of dietary n-3 fatty acids, viscous fiber, and plant stanols/sterols intake; and increased physical activity recommended to improve the lipid profile and reduce the risk of developing atherosclerotic cardiovascular disease.     Retinopathy:  No. Sent ophthalmology referral.   Nephropathy:  Yes, stable CKD 3.  Consider Empag start.   Neuropathy: No.    Feet: OK, no ulcers or lesions.   Lipids: Has CAD and is taking a statin.          Follow-up: Return in 3-6 months   60 minutes in preparation for visit reviewing chart, labs and documentation, visiting with patient gathering history and in exam, education and counseling, as well as coordination of care, further chart review and documentation following visit on this date of service and as alluded to documented above.        It is my privilege to be involved in the care of the above  patient.     Muriel Will PA-C, MPAS  [facility]  Diabetes, Endocrinology, and Metabolism  [phone] Appointments/Nurse  [phone] Fax  [phone] pager  [phone] nurse line

## 2021-02-10 ENCOUNTER — TELEPHONE (OUTPATIENT)
Dept: CARDIOLOGY | Facility: CLINIC | Age: 76
End: 2021-02-10

## 2021-02-10 ENCOUNTER — VIRTUAL VISIT (OUTPATIENT)
Dept: UROLOGY | Facility: CLINIC | Age: 76
End: 2021-02-10
Payer: MEDICARE

## 2021-02-10 VITALS — HEIGHT: 66 IN | WEIGHT: 293 LBS | BODY MASS INDEX: 47.09 KG/M2

## 2021-02-10 DIAGNOSIS — N39.46 MIXED INCONTINENCE: Primary | ICD-10-CM

## 2021-02-10 PROCEDURE — 99214 OFFICE O/P EST MOD 30 MIN: CPT | Mod: 95 | Performed by: PHYSICIAN ASSISTANT

## 2021-02-10 ASSESSMENT — PAIN SCALES - GENERAL: PAINLEVEL: SEVERE PAIN (7)

## 2021-02-10 ASSESSMENT — MIFFLIN-ST. JEOR: SCORE: 1936.51
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
## 2021-02-10 NOTE — TELEPHONE ENCOUNTER
RECORDS RECEIVED FROM: Internal   DATE RECEIVED: 03.17.2021   NOTES STATUS DETAILS   OFFICE NOTE from referring provider Internal 02.09.2021 Muriel Will PA-C   OFFICE NOTE from other specialist  Internal 01.07.2021 Radha Llanos, JODY    03.21.2020 Magdalena Hall PA-C    04.10.2019 Amee Connell MD    04.03.2019 Keven Jaeger MD    01.02.2019    Rafaela William MD     11.29.2018 Odalis Medley PA-C   *Only VASCULITIS or LUPUS gather office notes for the following N/A    *PULMONARY   N/A    *ENT N/A    *DERMATOLOGY N/A    *RHEUMATOLOGY N/A    DISCHARGE SUMMARY from hospital N/A    DISCHARGE REPORT from the ER N/A    MEDICATION LIST Internal / CE    IMAGING  (NEED IMAGES AND REPORTS)     KIDNEY CT SCAN N/A    KIDNEY ULTRASOUND N/A    MR ABDOMEN N/A    NUCLEAR MEDICINE RENAL N/A    LABS     CBC Internal 03.25.2020   CMP Internal 03.25.2020   BMP Internal 12.20.2020   UA Internal 04.30.2019   URINE PROTEIN Internal 04.30.2019   RENAL PANEL N/A    BIOPSY     KIDNEY BIOPSY  N/A
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
## 2021-02-10 NOTE — TELEPHONE ENCOUNTER
Pt needs to reschedule 3/24 [facility] virtual CORE appointment with labs prior.

## 2021-02-15 DIAGNOSIS — I50.32 CHRONIC DIASTOLIC HEART FAILURE (H): Chronic | ICD-10-CM

## 2021-02-15 NOTE — PROCEDURES
Diagnostic Medial Branch Block    The patient s identity, the procedure to be performed and the specific site of the procedure was verified in accordance with [facility] Protocol.  Diagnosis: Facet Arthropathy  Pre-procedure Pain Score: 9/10           Procedure Note:  Informed consent was obtained.  The patient was positioned comfortably in the prone position.  There was no evidence of infection at the sites of needle insertion.  The patient was prepped and draped in a sterile fashion.  Skeletal landmarks were identified with the fluoroscope guidance.  22 gauge spinal needles were then placed at the junction of the superior articulating process and the transverse process for lumbar and thoracic levels and centrally on the lateral mass in the cervical region.  Medication was then injected after negative aspiration. The patient was then observed for 30 minutes.  No complications were noted.      Levels:Bilateral   L3/L4/ALA  Medication (per site): 0.5cc   0.25% Bupivacaine      Post Procedure Pain Score: 8/10    The patient was given discharge instructions and verbalizes understanding, including understanding of those signs and symptoms that would require emergency care.     Plan:   The patient will fill out a pain diary for the remainder of the day and will either fax, email or bring it to the pain clinic.      Counseling: Greater than 50% of this patient visit was spent in counseling the patient regarding the treatment of their pain, coordinating their overall treatment plan and assessing their progress.

## 2021-02-16 ENCOUNTER — ALLIED HEALTH/NURSE VISIT (OUTPATIENT)
Dept: BEHAVIORAL HEALTH | Facility: CLINIC | Age: 76
End: 2021-02-16
Payer: COMMERCIAL

## 2021-02-16 DIAGNOSIS — M54.50 BILATERAL LOW BACK PAIN, UNSPECIFIED CHRONICITY, UNSPECIFIED WHETHER SCIATICA PRESENT: Primary | ICD-10-CM

## 2021-02-16 PROCEDURE — 99207 PR COMMUNITY PARAMEDIC - PATIENT NOT BILLABLE: CPT

## 2021-02-16 ASSESSMENT — ACTIVITIES OF DAILY LIVING (ADL): DEPENDENT_IADLS:: SHOPPING

## 2021-02-16 NOTE — PROGRESS NOTES
"[facility] Follow-up Visit  February 16, 2021  TIME: ***    Mariel Ribeiro is a 75 year old female being seen at home for a follow-up visit.    Present at appointment:           Chief Complaint   Patient presents with     Outreach       [facility] Utilization: ***     Utilization    Last refreshed: 2/16/2021 10:41 AM: Hospital Admissions 0           Last refreshed: 2/16/2021 10:41 AM: ED Visits 1           Last refreshed: 2/16/2021 10:41 AM: No Show Count (past year) 2              Current as of: 2/16/2021 10:41 AM              There were no vitals taken for this visit.    Clinical Concerns:  Current Medical Concerns:  ***    Current Behavioral Concerns: ***    Education Provided to patient: ***   Medication set up? {Yes/No:856426}  Pill Box issued: {Yes/No:416992}  Scale issued: {Yes/No:259356}  Health Maintenance Reviewed:      Clinical Pathway: {Clinical Pathway:186356}    {[facility] Patient Yes / No:696418}  {[facility] Type of Contact:88871446}    Recent blood sugars: 143@1325 non fasting,    Review of Symptoms/PE    Skin: {Skin:100::\"negative\"}  Eyes: {Eyes:200::\"negative\"}  Ears/Nose/Throat: {ENT:300::\"negative\"}  Respiratory: {Resp:400::\"No shortness of breath, dyspnea on exertion, cough, or hemoptysis\"}  Cardiovascular: {CV:500::\"negative\"}  Gastrointestinal: {GI:600::\"negative\"}  Genitourinary: {:700::\"negative\"}  Musculoskeletal: {Musc:800::\"negative\"}  Neurologic: {Neur:900::\"negative\"}  Psychiatric: {Psych:1000::\"negative\"}    Pain Management::                 Plan:     Time spent with patient: {MINUTES:366582}    The patient meets one or more of the following criteria:  {cpbillingcriteria:017165}    Acute concern/Follow-up recommendations: ***    Next  visit scheduled: ***    Issues for Provider to follow up on:     Provider follow up visit needed: ***      "

## 2021-02-17 RX ORDER — METOPROLOL SUCCINATE 25 MG/1
TABLET, EXTENDED RELEASE ORAL
Qty: 270 TABLET | Refills: 2 | Status: SHIPPED | OUTPATIENT
Start: 2021-02-17 | End: 2021-10-28

## 2021-02-17 NOTE — PROGRESS NOTES
[facility] CORE [facility] - CardioMEMS Reading Review    February 17, 2021   CardioMEMS reviewed. PAD in goal range.   Current diuretic: Bumex 4 mg daily.   Recent plan of care changes:     Current Threshold parameters:       Today's Waveform:       Readings:           RHC Date Wedge Pressure MEMS PAD during cath  (average of the 3 values)     7/1/2019 w/MEMS implant     20 mmHg   18 mmHg           Rosina Mays RN

## 2021-02-18 DIAGNOSIS — E11.22 TYPE 2 DIABETES MELLITUS WITH STAGE 3 CHRONIC KIDNEY DISEASE, WITH LONG-TERM CURRENT USE OF INSULIN (H): ICD-10-CM

## 2021-02-18 DIAGNOSIS — Z79.4 TYPE 2 DIABETES MELLITUS WITH STAGE 3 CHRONIC KIDNEY DISEASE, WITH LONG-TERM CURRENT USE OF INSULIN (H): ICD-10-CM

## 2021-02-18 DIAGNOSIS — N18.30 TYPE 2 DIABETES MELLITUS WITH STAGE 3 CHRONIC KIDNEY DISEASE, WITH LONG-TERM CURRENT USE OF INSULIN (H): ICD-10-CM

## 2021-02-18 RX ORDER — REPAGLINIDE 1 MG/1
1 TABLET ORAL
Qty: 180 TABLET | Refills: 3 | Status: SHIPPED | OUTPATIENT
Start: 2021-02-18 | End: 2022-01-04

## 2021-02-18 NOTE — TELEPHONE ENCOUNTER
repaglinide (PRANDIN) 1 MG tablet      Last Written Prescription Date:  12-3-19  Last Fill Quantity: 90,   # refills: 3  Last Office Visit : 2-9-21  Future Office visit:  8-10-21    Routing refill request to provider for review/approval because:  Overdue lab: A1c

## 2021-02-19 ENCOUNTER — ANCILLARY PROCEDURE (OUTPATIENT)
Dept: GENERAL RADIOLOGY | Facility: CLINIC | Age: 76
End: 2021-02-19
Payer: MEDICARE

## 2021-02-19 ENCOUNTER — OFFICE VISIT (OUTPATIENT)
Dept: ANESTHESIOLOGY | Facility: CLINIC | Age: 76
End: 2021-02-19
Payer: MEDICARE

## 2021-02-19 VITALS — BODY MASS INDEX: 50.52 KG/M2 | HEIGHT: 66 IN | RESPIRATION RATE: 16 BRPM

## 2021-02-19 VITALS
SYSTOLIC BLOOD PRESSURE: 122 MMHG | TEMPERATURE: 97.7 F | DIASTOLIC BLOOD PRESSURE: 64 MMHG | OXYGEN SATURATION: 95 % | HEART RATE: 90 BPM

## 2021-02-19 DIAGNOSIS — M25.551 RIGHT HIP PAIN: Primary | ICD-10-CM

## 2021-02-19 DIAGNOSIS — M25.551 RIGHT HIP PAIN: ICD-10-CM

## 2021-02-19 DIAGNOSIS — M47.816 FACET ARTHROPATHY, LUMBAR: ICD-10-CM

## 2021-02-19 PROCEDURE — 99213 OFFICE O/P EST LOW 20 MIN: CPT

## 2021-02-19 PROCEDURE — 73502 X-RAY EXAM HIP UNI 2-3 VIEWS: CPT | Mod: RT | Performed by: RADIOLOGY

## 2021-02-19 RX ORDER — CAPSAICIN 0.025 %
1 CREAM (GRAM) TOPICAL 3 TIMES DAILY
Qty: 120 G | Refills: 1 | Status: SHIPPED | OUTPATIENT
Start: 2021-02-19 | End: 2022-05-31

## 2021-02-19 ASSESSMENT — PAIN SCALES - GENERAL: PAINLEVEL: SEVERE PAIN (7)

## 2021-02-19 NOTE — PATIENT INSTRUCTIONS
Medications:    capsaicin (ZOSTRIX) 0.025 % external cream-   Apply 1 g topically 3 times daily.    Please provide the clinic with a minium of 1 week notice, on all prescription refills.     Imaging:    Right Hip X-ray.     IMAGING SERVICES HOURS:    All imaging modalities are available from 7 a.m. - 9 p.m. Monday through Friday  X-ray, CT, MRI, and General Ultrasound appointments are available from 7 a.m. -3:30 p.m. on Saturdays  X-ray, CT and MRI appointments are available from 8 a.m. - 4:30 p.m. on Sundays  Please call [phone] to schedule imaging exams    Procedures:    Call to schedule your procedure: [phone], option #2    Bilateral Lumbar Medial Branch Blocks    Your pre-procedure instructions are below, please call our clinic if you have any questions.    Recommended Follow up:      Call the clinic 1-2 Days after the procedure.        Please call [phone], option #1 to schedule your clinic appointment if you don't already have an appointment scheduled.      Procedure Information related to COVID-19    Please call [phone] option #2 to schedule, reschedule, or cancel your procedure appointment.   Phones are answered Monday - Friday from 08:00 - 4:30pm.  Leave a voicemail with your name, birth date, and phone number if no one is available to take your call.     You will need to be tested for COVID-19 within 4 days (96 hours) of your procedure.  You will be called to schedule your COVID test by a central scheduling team. If you have not been contacted to schedule your test within 4 days of the scheduled procedure, call [phone]    Please be aware that the turn around time for the test is approximately 24-72 hours.   If your results are still pending the day of your procedure, you will be notified as soon as possible as the procedure may be cancelled.    Please note: You will only be contacted for positive and pending results.       At this time there are NO VISITORS allowed.  The procedure  center staff will call you several days before the procedure to review important information that you will need to know for the day of the procedure.   Please contact the clinic if you have further questions about this information.       Information related to Scheduling and Pre-Procedure Instructions:      After calling to schedule your procedure appointment, please contact the clinic to make your follow up clinic appointment 4 weeks after the procedure.  Clinic phone- [phone], option #1      If you are having a Diagnostic procedure, you will be given a Pain Diary to document your pain relief.   Please fax the completed form to our office.   fax number is [phone]    If you must reschedule your procedure more than two times, you must follow up in clinic before rescheduling again.        Preparing for your procedure    CAUTION - FAILURE TO FOLLOW THESE PRE-PROCEDURE INSTRUCTIONS WILL RESULT IN YOUR PROCEDURE BEING RESCHEDULED.      Your procedure: Bilateral Lumbar Medial Branch Blocks.             You must have a  take you home after your procedure. Transportation by taxi or para-transit is okay as long as you have a responsible adult accompany you. You must provide your 's full name and contact number at time of check in.     Fasting Protocol (Local)Please have nothing to eat and drink for 2 hours before the procedure.      Medications If you take any medications, DO NOT STOP. Take your medications as usual the day of your procedure with a sip of water AT LEAST 2 HOURS PRIOR TO ARRIVAL.    Antibiotics If you are currently taking antibiotics, you must complete the entire dose 7 days prior to your scheduled procedure. You must be clear of any signs or symptoms of infection. If you begin antibiotics, please contact our clinic for instructions.     Fever, Chills, or Rash If you experience a fever of higher than 100 degrees, chills, rash, or open wounds during the one week before your  procedure, please call the clinic to see if you may proceed with your procedure.      Medication Hold List  **Patients under Cardiology/Neurology care should consult their provider prior to the pain procedure to verify pre-procedure medication instructions. The information below contains general guidelines.**    Blood Thinners If you are taking daily ASPIRIN, PLAVIX, OR OTHER BLOOD THINNERS SUCH AS COUMADIN/WARFARIN, we will need your prescribing doctor to sign a release permitting you to stop these medications. Once approved by your prescribing doctor - STOP ALL BLOOD THINNERS BASED ON THE TIME TABLE BELOW PRIOR TO YOUR PROCEDURE. If you have been instructed to stop WARFARIN(COUMADIN), you must have an INR lab drawn the day before your procedure. . Your INR must be within normal limits before we can perform your injection. MEDICATIONS CAN BE RESTARTED AFTER YOUR PROCEDURE.    14 DAY HOLD  Ticlid (ticlopidine)    10 DAY HOLD  Effient (Prasugel)    3 DAY HOLD  Xarelto (rivaroxaban) 7 DAY HOLD  Anacin, Bufferin, Ecotrin, Excedrin, Aggrenox (Aspirin)  Brilinta (ticagrelor)  Coumadin (Warfarin)  Pradexa (Dabigatran)  Elmiron (Pentosan)  Plavix (Clopidogrel Bisulfate)  Pletal (Cilostazol)    24 HOUR HOLD  Lovenox (enoxaparin)  Agrylin (Anagrelide)        Non-steroidal Anti-inflammatories (NSAIDs) DO NOT TAKE any non-steroidal anti-inflammatory medications (NSAIDs) listed on the table below. MEDICATIONS CAN BE RESTARTED AFTER YOUR PROCEDURE. Celebrex is OK to take and does not need to be discontinued.     Medications to stop:  3 DAY HOLD  Advil, Motrin (Ibuprofen)  Arthrotec (diciofenac sodium/misoprostol)  Clinoril (Sulindac)  Indocin (Indomethacin)  Lodine (Etodolac)  Toradol (Ketorolac)  Vicoprofen (Hydrocodone and Ibuprofen)  Voltaren (Diclotenac)    14 DAY HOLD  Daypro (Oxaprozin)  Feldene (Piroxicam)   7 DAY HOLD  Aleve (Naproxen sodium)  Darvon compound (contains aspirin)  Naprosyn (Naproxen)  Norgesic Forte  (contains aspirin)  Mobic (Meloxicam)  Oruvall (Ketoprofen)  Percodan (contains aspirin)  Relafen (Nabumetone)  Salsalate  Trilisate  Vitamin E (more than 400 mg per day)  Any medication containing aspirin                To speak with a nurse, schedule/reschedule/cancel a clinic appointment, or request a medication refill call: [phone]     You can also reach us by [employer]: [email or web address]

## 2021-02-19 NOTE — PROGRESS NOTES
LPN read through the instructions with pt for the recommended procedure: Bilateral Lumbar Medial Branch Blocks.   Pt verbalized understanding to holding appropriate medication per protocol, and was agreeable to NPO policy and needing a .    Anticoagulant: Asa 81 mg, Dr. Porras is okay with the pt continuing this.     Recommended follow up: Pt will call 1-2 days after the procedure.     Rosina Ruggiero LPN
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
## 2021-02-19 NOTE — PROGRESS NOTES
Community Paramedics Follow-up Visit  February 16, 2021  TIME: 1500    Mariel Ribeiro is a 75 year old female being seen at home for a follow-up visit.    Present at appointment:           Chief Complaint   Patient presents with     Outreach       [place] Utilization:      Utilization    Last refreshed: 2/16/2021 10:41 AM: Hospital Admissions 0           Last refreshed: 2/16/2021 10:41 AM: ED Visits 1           Last refreshed: 2/16/2021 10:41 AM: No Show Count (past year) 2              Current as of: 2/16/2021 10:41 AM              There were no vitals taken for this visit.    Clinical Concerns:  Current Medical Concerns:  Back pain, cognitive decline    Current Behavioral Concerns: none    Education Provided to patient: no   Medication set up? No  Pill Box issued: No  Scale issued: No  Health Maintenance Reviewed:      Clinical Pathway: None    No  Face to Face in Home / Community    Recent blood sugars: 143@1325 non fasting,    Review of Symptoms/PE    Skin: negative  Eyes: negative  Ears/Nose/Throat: negative  Respiratory: Dyspnea on exertion-   Cardiovascular: negative  Gastrointestinal: negative  Genitourinary: negative  Musculoskeletal: back pain  Neurologic: numbness or tingling of hands, numbness or tingling of feet and memory problems  Psychiatric: negative    Pain Management::                 Plan:     Time spent with patient: 60    The patient meets one or more of the following criteria:  * Has been identified by their primary care provider at risk of nursing home placement    Acute concern/Follow-up recommendations: Continue tx plan    Next CP visit scheduled: 3/1/21    Issues for Provider to follow up on: The pt reports no relief in her back pain, she has an upcoming with a pain clinic.  Recent BP and BG WNL.      Provider follow up visit needed: Multiple upcoming.

## 2021-02-20 DIAGNOSIS — Z11.59 ENCOUNTER FOR SCREENING FOR OTHER VIRAL DISEASES: Primary | ICD-10-CM

## 2021-02-25 ENCOUNTER — ALLIED HEALTH/NURSE VISIT (OUTPATIENT)
Dept: CARDIOLOGY | Facility: CLINIC | Age: 76
End: 2021-02-25
Attending: NURSE PRACTITIONER
Payer: MEDICARE

## 2021-02-25 DIAGNOSIS — I50.32 CHRONIC DIASTOLIC HEART FAILURE (H): Primary | ICD-10-CM

## 2021-02-25 PROCEDURE — 93264 REM MNTR WRLS P-ART PRS SNR: CPT | Performed by: NURSE PRACTITIONER

## 2021-02-25 NOTE — PROGRESS NOTES
[facility] - CardioMEMS Reading Review    February 25, 2021   CardioMEMS reviewed. PAD within goal range.   Current diuretic: Bumex 4 mg daily  Recent plan of care changes:     Current Threshold parameters:       Today's Waveform:       Readings:           RHC Date Wedge Pressure MEMS PAD during cath  (average of the 3 values)     7/1/2019 w/MEMS implant     20 mmHg   18 mmHg           Rosina Mays RN

## 2021-02-26 DIAGNOSIS — Z11.59 ENCOUNTER FOR SCREENING FOR OTHER VIRAL DISEASES: ICD-10-CM

## 2021-02-26 LAB
SARS-COV-2 RNA RESP QL NAA+PROBE: NORMAL
SPECIMEN SOURCE: NORMAL

## 2021-02-26 PROCEDURE — U0005 INFEC AGEN DETEC AMPLI PROBE: HCPCS

## 2021-02-26 PROCEDURE — U0003 INFECTIOUS AGENT DETECTION BY NUCLEIC ACID (DNA OR RNA); SEVERE ACUTE RESPIRATORY SYNDROME CORONAVIRUS 2 (SARS-COV-2) (CORONAVIRUS DISEASE [COVID-19]), AMPLIFIED PROBE TECHNIQUE, MAKING USE OF HIGH THROUGHPUT TECHNOLOGIES AS DESCRIBED BY CMS-2020-01-R: HCPCS

## 2021-02-27 DIAGNOSIS — G47.00 INSOMNIA, UNSPECIFIED TYPE: ICD-10-CM

## 2021-02-27 DIAGNOSIS — E78.5 HYPERLIPIDEMIA WITH TARGET LDL LESS THAN 70: ICD-10-CM

## 2021-03-01 ENCOUNTER — ALLIED HEALTH/NURSE VISIT (OUTPATIENT)
Dept: OTHER | Facility: CLINIC | Age: 76
End: 2021-03-01
Payer: MEDICARE

## 2021-03-01 VITALS
HEART RATE: 72 BPM | TEMPERATURE: 97.7 F | RESPIRATION RATE: 16 BRPM | OXYGEN SATURATION: 94 % | DIASTOLIC BLOOD PRESSURE: 59 MMHG | SYSTOLIC BLOOD PRESSURE: 138 MMHG

## 2021-03-01 DIAGNOSIS — M54.50 LOWER BACK PAIN: Primary | ICD-10-CM

## 2021-03-01 PROCEDURE — 99207 PR COMMUNITY PARAMEDIC - PATIENT NOT BILLABLE: CPT

## 2021-03-01 ASSESSMENT — ACTIVITIES OF DAILY LIVING (ADL): DEPENDENT_IADLS:: SHOPPING

## 2021-03-01 NOTE — PROGRESS NOTES
Community Paramedics Follow-up Visit  March 1, 2021  TIME: 1330    Mariel Ribeiro is a 75 year old female being seen at home for a follow-up visit.    Present at appointment:patient           Chief Complaint   Patient presents with     Outreach       [place] Utilization:      Utilization    Last refreshed: 2/28/2021  3:09 PM: Hospital Admissions 0           Last refreshed: 2/28/2021  3:09 PM: ED Visits 1           Last refreshed: 2/28/2021  3:09 PM: No Show Count (past year) 2              Current as of: 2/28/2021  3:09 PM              There were no vitals taken for this visit.    Clinical Concerns:  Current Medical Concerns:  HTN   Current Behavioral Concerns: None    Education Provided to patient: no   Medication set up? No  Pill Box issued: No  Scale issued: No  Health Maintenance Reviewed:    Back ain  Clinical Pathway: None    No  Face to Face in Home / Community    Recent blood sugars:102, 118, 139, 136, 148, 205, 139, 124, 133, 131, 103    Review of Symptoms/PE    Skin: negative  Eyes: negative  Ears/Nose/Throat: negative  Respiratory: No shortness of breath, dyspnea on exertion, cough, or hemoptysis  Cardiovascular: negative  Gastrointestinal: negative  Genitourinary: negative  Musculoskeletal: back pain  Neurologic: numbness or tingling of hands and numbness or tingling of feet  Psychiatric: negative    Pain Management::                 Plan:     Time spent with patient: 60    The patient meets one or more of the following criteria:  * Requires services to prevent readmission to a nursing home or hospital    Acute concern/Follow-up recommendations: Continue tx plan.    Next CP visit scheduled: 3/15/21    Issues for Provider to follow up on:  The pt is complaining of abdominal pain that has been going on for about one month.  She states it usually is in the LRQ but will radiate into the LLQ.  Her LBM was this morning and normal.  The pain is constant but will increase after eating.  She is also reporting  frequent nausea with emesis about once a week.  Her LRQ is rigid and painful, 6/10, to palpation.       Provider follow up visit needed: IJEOMA

## 2021-03-01 NOTE — PROGRESS NOTES
Remote monitoring of Pulmonary Artery Pressures via CardioMEMS device reviewed at least weekly and as needed with CORE nursing. Diuretics adjusted accordingly.    1/26/21 through 2/24/21- billing cycle    Austin CORNEJO NP-C

## 2021-03-02 ENCOUNTER — ANCILLARY PROCEDURE (OUTPATIENT)
Dept: RADIOLOGY | Facility: AMBULATORY SURGERY CENTER | Age: 76
End: 2021-03-02
Payer: MEDICARE

## 2021-03-02 ENCOUNTER — OFFICE VISIT (OUTPATIENT)
Dept: FAMILY MEDICINE | Facility: CLINIC | Age: 76
End: 2021-03-02
Payer: MEDICARE

## 2021-03-02 ENCOUNTER — PATIENT OUTREACH (OUTPATIENT)
Dept: CARE COORDINATION | Facility: CLINIC | Age: 76
End: 2021-03-02

## 2021-03-02 ENCOUNTER — HOSPITAL ENCOUNTER (OUTPATIENT)
Facility: AMBULATORY SURGERY CENTER | Age: 76
Discharge: HOME | End: 2021-03-02
Payer: MEDICARE

## 2021-03-02 VITALS
WEIGHT: 293 LBS | RESPIRATION RATE: 16 BRPM | SYSTOLIC BLOOD PRESSURE: 157 MMHG | OXYGEN SATURATION: 98 % | HEART RATE: 68 BPM | DIASTOLIC BLOOD PRESSURE: 79 MMHG | BODY MASS INDEX: 50.17 KG/M2 | TEMPERATURE: 97.4 F

## 2021-03-02 VITALS — RESPIRATION RATE: 16 BRPM | TEMPERATURE: 97.9 F

## 2021-03-02 DIAGNOSIS — K59.00 CONSTIPATION, UNSPECIFIED CONSTIPATION TYPE: ICD-10-CM

## 2021-03-02 DIAGNOSIS — E11.22 TYPE 2 DIABETES MELLITUS WITH STAGE 3B CHRONIC KIDNEY DISEASE, WITH LONG-TERM CURRENT USE OF INSULIN (H): ICD-10-CM

## 2021-03-02 DIAGNOSIS — M47.816 FACET ARTHROPATHY, LUMBAR: ICD-10-CM

## 2021-03-02 DIAGNOSIS — R52 PAIN: ICD-10-CM

## 2021-03-02 DIAGNOSIS — N18.4 CKD (CHRONIC KIDNEY DISEASE) STAGE 4, GFR 15-29 ML/MIN (H): ICD-10-CM

## 2021-03-02 DIAGNOSIS — R10.31 RLQ ABDOMINAL PAIN: Primary | ICD-10-CM

## 2021-03-02 DIAGNOSIS — N18.32 TYPE 2 DIABETES MELLITUS WITH STAGE 3B CHRONIC KIDNEY DISEASE, WITH LONG-TERM CURRENT USE OF INSULIN (H): ICD-10-CM

## 2021-03-02 DIAGNOSIS — Z79.4 TYPE 2 DIABETES MELLITUS WITH STAGE 3B CHRONIC KIDNEY DISEASE, WITH LONG-TERM CURRENT USE OF INSULIN (H): ICD-10-CM

## 2021-03-02 LAB
BASOPHILS NFR BLD AUTO: 0.4 %
DIFFERENTIAL METHOD BLD: ABNORMAL
EOSINOPHIL NFR BLD AUTO: 1.3 %
ERYTHROCYTE [DISTWIDTH] IN BLOOD BY AUTOMATED COUNT: 12.6 % (ref 10–15)
GLUCOSE BLDC GLUCOMTR-MCNC: 166 MG/DL (ref 70–99)
HCT VFR BLD AUTO: 39.4 % (ref 35–47)
HGB BLD-MCNC: 12.9 G/DL (ref 11.7–15.7)
LYMPHOCYTES NFR BLD AUTO: 19.8 %
MCH RBC QN AUTO: 28.8 PG (ref 26.5–33)
MCHC RBC AUTO-ENTMCNC: 32.7 G/DL (ref 31.5–36.5)
MCV RBC AUTO: 88 FL (ref 78–100)
MONOCYTES NFR BLD AUTO: 8.1 %
NEUTROPHILS NFR BLD AUTO: 70.4 %
PLATELET # BLD AUTO: 127 10E9/L (ref 150–450)
RBC # BLD AUTO: 4.48 10E12/L (ref 3.8–5.2)
WBC # BLD AUTO: 7.8 10E9/L (ref 4–11)

## 2021-03-02 PROCEDURE — 99214 OFFICE O/P EST MOD 30 MIN: CPT | Performed by: FAMILY MEDICINE

## 2021-03-02 PROCEDURE — 36415 COLL VENOUS BLD VENIPUNCTURE: CPT | Performed by: FAMILY MEDICINE

## 2021-03-02 PROCEDURE — 64493 INJ PARAVERT F JNT L/S 1 LEV: CPT | Mod: RT

## 2021-03-02 PROCEDURE — 85025 COMPLETE CBC W/AUTO DIFF WBC: CPT | Performed by: FAMILY MEDICINE

## 2021-03-02 PROCEDURE — 82043 UR ALBUMIN QUANTITATIVE: CPT | Performed by: PHYSICIAN ASSISTANT

## 2021-03-02 RX ORDER — BUPIVACAINE HYDROCHLORIDE 2.5 MG/ML
INJECTION, SOLUTION INFILTRATION; PERINEURAL PRN
Status: DISCONTINUED | OUTPATIENT
Start: 2021-03-02 | End: 2021-03-02 | Stop reason: HOSPADM

## 2021-03-02 ASSESSMENT — PAIN SCALES - GENERAL: PAINLEVEL: EXTREME PAIN (8)

## 2021-03-02 NOTE — PROGRESS NOTES
Assessment & Plan     RLQ abdominal pain  Constipation, unspecified constipation type    - CBC with platelets and differential    Presents with 4-5 day history of mid-axillary RIGHT sided lower abdominal pain- hx of LLQ diverticulitis.  Feels area is hard and tender.  No N/V, fever.  Hx of constipation- drinking SmoothMove Tea with Miralax- starting to pass stool.  Recommend continued SmoothMove Tea with MIralax until feels emptied out and RLQ pain and firmness is gone (expect in next one week or less).  If no change or new or worsening sx- close Follow-up recommended.  CBC today- reassured with normal results.  Multiple comorbidities- recommend always close surveillance and communication prn.  Appreciate [facility]'s eyes and ears!    30 minutes spent on the date of the encounter doing chart review, review of test results, interpretation of tests, patient visit and documentation        Amee Connell MD  [facility]    Breana Floyd is a 75 year old who presents for the following health issues  accompanied by her friend:    HPI             Review of Systems   Constitutional, HEENT, cardiovascular, pulmonary, GI, , musculoskeletal, neuro, skin, endocrine and psych systems are negative, except as otherwise noted.      Objective    Temp 97.9  F (36.6  C) (Tympanic)   Resp 16   There is no height or weight on file to calculate BMI.  Physical Exam   GENERAL: healthy, alert and no distress  EYES: Eyes grossly normal to inspection, PERRL and conjunctivae and sclerae normal  NECK: no adenopathy, no asymmetry, masses, or scars and thyroid normal to palpation  ABDOMEN: soft, nontender, no hepatosplenomegaly, no masses and bowel sounds normal  MS: no gross musculoskeletal defects noted, no edema  NEURO: Normal strength and tone, mentation intact and speech normal  PSYCH: mentation appears normal, affect normal/bright    Results for orders placed or performed in  visit on 03/02/21 (from the past 24 hour(s))   CBC with platelets and differential   Result Value Ref Range    WBC 7.8 4.0 - 11.0 10e9/L    RBC Count 4.48 3.8 - 5.2 10e12/L    Hemoglobin 12.9 11.7 - 15.7 g/dL    Hematocrit 39.4 35.0 - 47.0 %    MCV 88 78 - 100 fl    MCH 28.8 26.5 - 33.0 pg    MCHC 32.7 31.5 - 36.5 g/dL    RDW 12.6 10.0 - 15.0 %    Platelet Count 127 (L) 150 - 450 10e9/L    % Neutrophils 70.4 %    % Lymphocytes 19.8 %    % Monocytes 8.1 %    % Eosinophils 1.3 %    % Basophils 0.4 %    Diff Method Automated Method      *Note: Due to a large number of results and/or encounters for the requested time period, some results have not been displayed. A complete set of results can be found in Results Review.

## 2021-03-02 NOTE — PROGRESS NOTES
The Community Paramedic was able to reach out to the pt roommate and schedule an in clinic appointment as recommended by her PCP.    Rafaela ROMAN, NRP  Community Paramedic  Phone number [phone]  Email Adriel [email or web address]

## 2021-03-02 NOTE — PROCEDURES
Diagnostic Medial Branch Block    The patient s identity, the procedure to be performed and the specific site of the procedure was verified in accordance with [facility] Protocol.  Diagnosis: Facet Arthropathy    Pre-procedure Pain Score: 7/10           Procedure Note:  Informed consent was obtained.  The patient was positioned comfortably in the prone position.  There was no evidence of infection at the sites of needle insertion.  The patient was prepped and draped in a sterile fashion.  Skeletal landmarks were identified with the fluoroscope guidance.  22 gauge,  5 inch spinal needles were then placed at the junction of the superior articulating process and the transverse process for lumbar and thoracic levels and centrally on the lateral mass in the cervical region.  Medication was then injected after negative aspiration. The patient was then observed for 30 minutes.  No complications were noted.      Levels:Bilateral   L3/L4/ALA  Medication (per site): 0.5cc   0.25% Bupivacaine      Post Procedure Pain Score: 5/10    The patient was given discharge instructions and verbalizes understanding, including understanding of those signs and symptoms that would require emergency care.     Plan:   The patient will fill out a pain diary for the remainder of the day and will either fax, email or bring it to the pain clinic.      Counseling: Greater than 50% of this patient visit was spent in counseling the patient regarding the treatment of their pain, coordinating their overall treatment plan and assessing their progress.    Rhona Mata MD  Pain Fellow    Dr. Porras was present for the entirety of the above procedure    I saw and examined the patient with the fellow and agree with the findings and the plan of care as documented in the fellow's note. I was present for the entire procedure.  Nguyễn Porras IV, MD

## 2021-03-02 NOTE — PROGRESS NOTES
Patient really should be seen for these sorts of issues- she is high risk and with multiple comorbidities.  Recommend in person visit

## 2021-03-02 NOTE — DISCHARGE INSTRUCTIONS
Home Care Instructions after a Medial Branch Block      In a medial branch block, a local anesthetic (numbing medicine) is injected near the medial branch nerve. This stops the transmission of pain signals from the facet joint. If this reduces your pain and improves your mobility, it may tell the doctor which facet joint is causing the pain. This procedure is a diagnostic procedure and is typically short lasting. With this injection, a steroid to increase the longevity of the blocks effect may or may not be used.    Activity  -You may resume most normal activity levels with the exception of strenuous activity. It is important for us to know if your pain with normal activity is relieved after this injection.  -DO NOT shower for 24 hours  -DO NOT remove bandaid for 24 hours    Pain  -You may experience soreness at the injection site for one or two days  -You may use an ice pack for 20 minutes every 2 hours for the first 24 hours  -You may use a heating pad after the first 24 hours  -You may use Tylenol (acetaminophen) every 4 hours or other pain medicines as     directed by your physician    You may experience numbness radiating into your legs or arms (depending on the procedure location). This numbness may last several hours. Until sensation returns to normal; please use caution in walking, climbing stairs, and stepping out of your vehicle, etc.        DID YOU RECEIVE STEROIDS TODAY?      Common side effects of steroids:  Not everyone will experience corticosteroid side effects. If side effects are experienced, they will gradually subside in the 7-10 day period following an injection. Most common side effects include:  -Flushed face and/or chest  -Feeling of warmth, particularly in the face but could be an overall feeling of warmth  -Increased blood sugar in diabetic patients  -Menstrual irregularities my occur. If taking hormone-based birth control an alternate method of birth control is recommended  -Sleep  disturbances and/or mood swings are possible  -Leg cramps      PLEASE KEEP TRACK OF YOUR SYMPTOMS AND NOTE YOUR IMPROVEMENT FOR YOUR DOCTOR.       Fill out the pain diary and fax ([phone]) it back to us. This cues us to call the patient to follow up with scheduling the next procedure. They do not need to call us they have faxed the Pain Diary.     If they do not have access to a fax machine, they can attach it to [employer] and we will follow up with them. They do not need to call us.     If they cannot fax or [employer], then call our call center and request to speak with a nurse for follow up after a procedure [phone].    Please contact us if you have:  -Severe pain  -Fever more than 101.5 degrees Fahrenheit  -Signs of infection at the injection site (redness, swelling, or drainage)    If you have questions, please contact our office at [phone] between the hours of 7:00 am and 3:00 pm Monday through Friday. After office hours you can contact the on call provider by dialing [phone]. If you need immediate attention, we recommend that you go to a hospital emergency room or dial [phone].

## 2021-03-03 LAB
CREAT UR-MCNC: 26 MG/DL
MICROALBUMIN UR-MCNC: 39 MG/L
MICROALBUMIN/CREAT UR: 147.35 MG/G CR (ref 0–25)

## 2021-03-03 RX ORDER — NIACIN 500 MG/1
TABLET, EXTENDED RELEASE ORAL
Qty: 180 TABLET | Refills: 3 | Status: SHIPPED | OUTPATIENT
Start: 2021-03-03 | End: 2022-02-01

## 2021-03-03 RX ORDER — TRAZODONE HYDROCHLORIDE 50 MG/1
TABLET, FILM COATED ORAL
Qty: 270 TABLET | Refills: 3 | Status: SHIPPED | OUTPATIENT
Start: 2021-03-03 | End: 2022-02-15

## 2021-03-03 NOTE — PROGRESS NOTES
[facility] - CardioMEMS Reading Review    March 3, 2021   CardioMEMS reviewed. PAD in goal range.   Current diuretic: Bumex 4 mg daily.   Recent plan of care changes: none    Current Threshold parameters:       Today's Waveform:       Readings:           RHC Date Wedge Pressure MEMS PAD during cath  (average of the 3 values)     7/1/2019 w/MEMS implant     20 mmHg   18 mmHg           Rosina Mays RN

## 2021-03-04 ENCOUNTER — DOCUMENTATION ONLY (OUTPATIENT)
Dept: CARE COORDINATION | Facility: CLINIC | Age: 76
End: 2021-03-04

## 2021-03-04 ENCOUNTER — PRE VISIT (OUTPATIENT)
Dept: NEUROLOGY | Facility: CLINIC | Age: 76
End: 2021-03-04

## 2021-03-04 NOTE — TELEPHONE ENCOUNTER
FUTURE VISIT INFORMATION      FUTURE VISIT INFORMATION:    Date: 3/8/2021    Time: 1030am    Location: [facility] [place]  REFERRAL INFORMATION:    Referring provider:  Muriel Will PA-C     Referring providers clinic:  [facility]     Reason for visit/diagnosis  Memory Loss     RECORDS REQUESTED FROM:       Clinic name Comments Records Status Imaging Status   [facility] Will-2/9/2021    CT Head-2/3/2019, 10/4/2017    MR Brain-10/4/2017 Epic PACS

## 2021-03-08 ENCOUNTER — VIRTUAL VISIT (OUTPATIENT)
Dept: NEUROLOGY | Facility: CLINIC | Age: 76
End: 2021-03-08
Attending: PHYSICIAN ASSISTANT
Payer: MEDICARE

## 2021-03-08 DIAGNOSIS — G45.1 CAROTID ARTERY SYNDROME (HEMISPHERIC): ICD-10-CM

## 2021-03-08 DIAGNOSIS — E11.42 DIABETIC POLYNEUROPATHY ASSOCIATED WITH TYPE 2 DIABETES MELLITUS (H): Primary | ICD-10-CM

## 2021-03-08 PROCEDURE — 99203 OFFICE O/P NEW LOW 30 MIN: CPT | Mod: 95 | Performed by: PSYCHIATRY & NEUROLOGY

## 2021-03-08 RX ORDER — GABAPENTIN 100 MG/1
100 CAPSULE ORAL 3 TIMES DAILY
Qty: 90 CAPSULE | Refills: 11 | Status: SHIPPED | OUTPATIENT
Start: 2021-03-08 | End: 2022-05-31

## 2021-03-08 NOTE — PROGRESS NOTES
Mariel is a 75 year old who is being evaluated via a billable video visit.      How would you like to obtain your AVS? MyChart  If the video visit is dropped, the invitation should be resent by: Send to e-mail at: [email or web address]  Will anyone else be joining your video visit? No    Video-Visit Details    Type of service:  Video Visit X 40 MIN    [employer]    Platform used for Video Visit: Guy

## 2021-03-08 NOTE — LETTER
3/8/2021       RE: Mariel Ribeiro  [address] [place] [address]     Dear Colleague,    Thank you for referring your patient, Mariel Ribeiro, to the [facility] [place] at [facility]. Please see a copy of my visit note below.    Mariel is a 75 year old who is being evaluated via a billable video visit.      How would you like to obtain your AVS? MyChart  If the video visit is dropped, the invitation should be resent by: Send to e-mail at: [email or web address]  Will anyone else be joining your video visit? No    Video-Visit Details    Type of service:  Video Visit X 40 MIN    [facility]    Platform used for Video Visit: [employer]      Service Date: 2021      Amee Connell MD    [address]  [address]       RE: Mariel Ribeiro    MRN: [id number]   : 1945      Dear Dr. Connell:      We had the privilege of evaluating this very nice 75-year-old woman via a video visit lasting 40 minutes regarding her complaints of paresthesias in the upper and lower extremities.  Her housemate, Odalis, was with her and was very helpful in the history and the information given.  Ms. Ribeiro is a very pleasant 75-year-old with a long history of diabetes and diabetic complications.  She has her feet wrapped up because of a neuropathy problems and ulcers, although Odalis takes good care of that.  The patient complains of pain in her fingertips which can be quite severe and numbness, decreased sensation, perhaps 3-4 years.  She is on Lyrica and we did search our records and she has never been on gabapentin, and this is germane because she has about 15 different allergies, which does not include gabapentin either.  Her Lyrica dose seems appropriate.  She is using capsaicin external cream.      She does have insulin-dependent diabetes for many years.  She has also been noticed lately to have some slurring of  her speech and has been wondering about that.  There was some word-finding difficulty.  Swallowing and other bulbar signs are okay.  She has had stents implanted in her heart, and she has corneal implants as well.  She has some shortness of breath.  She is wheelchair-bound and also walks slowly with a walker with assistance.  She has lymphedema and she has had back issues.  No history of alcohol or B12 or other deficiency.  She has had a carotid ultrasound some time ago, which she has not had repeated for a few years.      FAMILY HISTORY:  Indicated the mother did have some vascular disease and  at the age of 60 with diabetic complication.      REVIEW OF SYSTEMS:  She says she occasionally will have double vision and cannot focus her eyes well.      She has numbness below the knees bilaterally and tingling into her feet.  She can walk with difficulty.      PHYSICAL EXAMINATION:  Her exam was limited because of mobility.  Blood pressure 157/79.  Her mental status exam is normal.  Cranial nerves looked very normal and she does have reduced vision from diabetic retinopathy.  His face is fairly symmetrical.  Her hands do not show any weakness.  There is no evidence of atrophy.  Her finger-to-nose testing was a little slow on the left but well carried out.  Fine finger movements are well.  She is able to get up and walk but with difficulty, but she does not shuffle and can walk fairly well and does need a walker, however.      In summary, she does have a history compatible with diabetic neuropathy.  She has had an EMG which supports that a few years ago.  She does not seem to have any other causes, but we will check a B12 level and also do a carotid ultrasound and a CT scan of her brain because of this complaint of speech difficulty.  She apparently has had some carotid problems before, and we will ascertain the condition of those.  She was recommended a multivitamin, and she was started on gabapentin 100 mg 3 times  a day and will report back to me in 4 weeks.  Thank you for referring her to Neurology.      Sincerely,      Edvin Greene MD       D: 2021   T: 2021   MT: FRANCES      Name:     ROSY PALMER   MRN:      [id number]        Account:      [id number]   :      1945           Service Date: 2021      Document: [id number]

## 2021-03-09 ENCOUNTER — PATIENT OUTREACH (OUTPATIENT)
Dept: CARE COORDINATION | Facility: CLINIC | Age: 76
End: 2021-03-09

## 2021-03-09 NOTE — PROGRESS NOTES
Clinic Care Coordination Contact  RUST/Voicemail    Clinical Data: Care Coordinator Outreach  Outreach attempted x 1.  Left message on patient's voicemail with call back information and requested return call.  Plan: Care Coordinator will try to reach patient again in 10 business days.

## 2021-03-10 ENCOUNTER — VIRTUAL VISIT (OUTPATIENT)
Dept: CARDIOLOGY | Facility: CLINIC | Age: 76
End: 2021-03-10
Attending: PHYSICIAN ASSISTANT
Payer: MEDICARE

## 2021-03-10 DIAGNOSIS — I50.32 CHRONIC DIASTOLIC HEART FAILURE (H): ICD-10-CM

## 2021-03-10 DIAGNOSIS — R00.2 PALPITATIONS: Primary | ICD-10-CM

## 2021-03-10 PROCEDURE — 99443 PR PHYSICIAN TELEPHONE EVALUATION 21-30 MIN: CPT | Mod: 95 | Performed by: PHYSICIAN ASSISTANT

## 2021-03-10 NOTE — PROGRESS NOTES
[facility] - CardioMEMS Reading Review    March 10, 2021   CardioMEMS reviewed. PAD within goal range.   Current diuretic: Bumex 4 mg daily.   Recent plan of care changes: none. Sees CORE clinic today.     Current Threshold parameters:       Today's Waveform:       Readings:           RHC Date Wedge Pressure MEMS PAD during cath  (average of the 3 values)     7/1/2019 w/MEMS implant     20 mmHg   18 mmHg           Rosina Mays RN

## 2021-03-10 NOTE — NURSING NOTE
Call back from Rafaela - she can draw labs and will see Mariel on Monday 3/15. Will draw labs at that time. Rosina Mays RN

## 2021-03-10 NOTE — NURSING NOTE
Called Rafaela, community paramedic and left message to see if she can draw labs. If not able will need to come to clinic for labs. Rosina Mays RN

## 2021-03-10 NOTE — PROGRESS NOTES
Mariel is a 75 year old who is being evaluated via a billable telephone visit.      What phone number would you like to be contacted at? [phone]  How would you like to obtain your AVS? Mail a copy  Phone call duration: 23 minutes
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
## 2021-03-10 NOTE — PATIENT INSTRUCTIONS
Take your medicines every day, as directed    Changes made today:  o No medicine changes   Monitor Your Weight and Symptoms    Contact us if you:      Gain 2 pounds in one day or 5 pounds in one week    Feel more short of breath    Notice more leg swelling    Feel lightheadeded   Change your lifestyle    Limit Salt or Sodium:    2000 mg  Limit Fluids:    2000 mL or approximately 64 ounces  Eat a Heart Healthy Diet    Low in saturated fats  Stay Active:    Aim to move at least 150 minutes every  week         To Contact us    During Business Hours:  [phone], option # 1 (University)  Then option # 4 (medical questions)     After hours, weekends or holidays:   [phone], Option #4  Ask to speak to the On-Call Cardiologist. Inform them you are a CORE/heart failure patient at the [place].     Use [employer] allows you to communicate directly with your heart team through secure messaging.    [employer] ([employer]) can be accessed any time on your phone, computer, or tablet.    If you need assistance, we'd be happy to help!         Keep your Heart Appointments:    - BMP within about 1 week (I will have Elena reach out to see if the [facility] Paramedics can get these labs)  - Yuni as scheduled in July or sooner if you are not feeling well  - Dr. Blue in December (schedule not yet open)

## 2021-03-10 NOTE — NURSING NOTE
"Vitals - Patient Reported 9/28/2020 12/28/2020 3/10/2021   Height (Patient Reported) 5' 6\" - -   Weight (Patient Reported) 321 lb 12.8 oz 313 lb 314 lb   BMI (Based on Pt Reported Ht/Wt) 51.94 kg/m2 - -   Systolic (Patient Reported) 117 125 123   Diastolic (Patient Reported) 55 54 56   Pulse (Patient Reported) 68 - -   SpO2 (Patient Reported) 95 - -           "

## 2021-03-15 ENCOUNTER — TELEPHONE (OUTPATIENT)
Dept: NEPHROLOGY | Facility: CLINIC | Age: 76
End: 2021-03-15

## 2021-03-15 DIAGNOSIS — N18.4 CKD (CHRONIC KIDNEY DISEASE) STAGE 4, GFR 15-29 ML/MIN (H): Primary | ICD-10-CM

## 2021-03-15 NOTE — TELEPHONE ENCOUNTER
Call to patient regarding appointment with Dr. Pham March 17th at 300 pm. Spoke to patients sister, they will try to get labs drawn at [facility] prior.  Jyoti Martinez LPN  Nephrology  [phone]

## 2021-03-16 ENCOUNTER — ALLIED HEALTH/NURSE VISIT (OUTPATIENT)
Dept: OTHER | Facility: CLINIC | Age: 76
End: 2021-03-16
Payer: MEDICARE

## 2021-03-16 ENCOUNTER — HOSPITAL ENCOUNTER (OUTPATIENT)
Dept: LAB | Facility: CLINIC | Age: 76
Discharge: HOME | End: 2021-03-16
Attending: INTERNAL MEDICINE | Admitting: INTERNAL MEDICINE
Payer: MEDICARE

## 2021-03-16 DIAGNOSIS — N18.4 CKD (CHRONIC KIDNEY DISEASE) STAGE 4, GFR 15-29 ML/MIN (H): ICD-10-CM

## 2021-03-16 DIAGNOSIS — I50.9 CHF (CONGESTIVE HEART FAILURE) (H): Primary | ICD-10-CM

## 2021-03-16 LAB
ALBUMIN UR-MCNC: NEGATIVE MG/DL
APPEARANCE UR: CLEAR
BILIRUB UR QL STRIP: NEGATIVE
COLOR UR AUTO: ABNORMAL
CREAT UR-MCNC: 70 MG/DL
GLUCOSE UR STRIP-MCNC: NEGATIVE MG/DL
HGB UR QL STRIP: NEGATIVE
KETONES UR STRIP-MCNC: NEGATIVE MG/DL
LEUKOCYTE ESTERASE UR QL STRIP: ABNORMAL
NITRATE UR QL: NEGATIVE
PH UR STRIP: 5.5 PH (ref 5–7)
PROT UR-MCNC: 0.1 G/L
PROT/CREAT 24H UR: 0.15 G/G CR (ref 0–0.2)
RBC #/AREA URNS AUTO: 1 /HPF (ref 0–2)
SOURCE: ABNORMAL
SP GR UR STRIP: 1.01 (ref 1–1.03)
UROBILINOGEN UR STRIP-MCNC: NORMAL MG/DL (ref 0–2)
WBC #/AREA URNS AUTO: 4 /HPF (ref 0–5)

## 2021-03-16 PROCEDURE — 81001 URINALYSIS AUTO W/SCOPE: CPT | Performed by: INTERNAL MEDICINE

## 2021-03-16 PROCEDURE — 84156 ASSAY OF PROTEIN URINE: CPT | Performed by: INTERNAL MEDICINE

## 2021-03-16 PROCEDURE — 99207 PR COMMUNITY PARAMEDIC - PATIENT NOT BILLABLE: CPT

## 2021-03-16 NOTE — PROGRESS NOTES
Community Paramedics Follow-up Visit  March 16, 2021  TIME:1400    Mariel Ribeiro is a 75 year old female being seen at home for a follow-up visit.    Present at appointment: patient and friend         Chief Complaint   Patient presents with     Blood Draw       Universal Utilization:      Utilization    Last refreshed: 3/16/2021  3:59 PM: Hospital Admissions 0           Last refreshed: 3/16/2021  3:59 PM: ED Visits 1           Last refreshed: 3/16/2021  3:59 PM: No Show Count (past year) 2              Current as of: 3/16/2021  3:59 PM              There were no vitals taken for this visit.    Clinical Concerns:  Current Medical Concerns:  Diabetes, HTN    Current Behavioral Concerns: None identified    Education Provided to patient: no   Medication set up? No  Pill Box issued: No  Scale issued: No  Flu Shot given: No  Lab draw or specimen collection: Yes  Food box issued: No  Collaborative visit with PCP: No    Health Maintenance Reviewed:      Clinical Pathway: None    No  Face to Face in Home / Community      Review of Symptoms/PE    Skin: negative  Eyes: negative  Ears/Nose/Throat: negative  Respiratory: Cough-   Cardiovascular: negative  Gastrointestinal: negative  Genitourinary: negative  Musculoskeletal: back pain  Neurologic: negative, numbness or tingling of hands and numbness or tingling of feet  Psychiatric: negative    Pain Management::                 Plan:     Time spent with patient: 45    The patient meets one or more of the following criteria:  * Requires services to prevent readmission to a nursing home or hospital    Acute concern/Follow-up recommendations: Continue tx plan.    Next CP visit scheduled: TBD    Issues for Provider to follow up on: The Community Paramedic was able to get a UA from the pt, but was unable to obtain blood.       Provider follow up visit needed: Multiple upcoming.

## 2021-03-17 ENCOUNTER — PATIENT OUTREACH (OUTPATIENT)
Dept: CARDIOLOGY | Facility: CLINIC | Age: 76
End: 2021-03-17

## 2021-03-17 ENCOUNTER — VIRTUAL VISIT (OUTPATIENT)
Dept: NEPHROLOGY | Facility: CLINIC | Age: 76
End: 2021-03-17
Attending: STUDENT IN AN ORGANIZED HEALTH CARE EDUCATION/TRAINING PROGRAM
Payer: MEDICARE

## 2021-03-17 ENCOUNTER — PRE VISIT (OUTPATIENT)
Dept: NEPHROLOGY | Facility: CLINIC | Age: 76
End: 2021-03-17

## 2021-03-17 DIAGNOSIS — E11.22 TYPE 2 DIABETES MELLITUS WITH STAGE 3B CHRONIC KIDNEY DISEASE, WITH LONG-TERM CURRENT USE OF INSULIN (H): ICD-10-CM

## 2021-03-17 DIAGNOSIS — N18.32 TYPE 2 DIABETES MELLITUS WITH STAGE 3B CHRONIC KIDNEY DISEASE, WITH LONG-TERM CURRENT USE OF INSULIN (H): ICD-10-CM

## 2021-03-17 DIAGNOSIS — Z79.4 TYPE 2 DIABETES MELLITUS WITH STAGE 3B CHRONIC KIDNEY DISEASE, WITH LONG-TERM CURRENT USE OF INSULIN (H): ICD-10-CM

## 2021-03-17 DIAGNOSIS — N18.4 CKD (CHRONIC KIDNEY DISEASE) STAGE 4, GFR 15-29 ML/MIN (H): ICD-10-CM

## 2021-03-17 PROCEDURE — 99205 OFFICE O/P NEW HI 60 MIN: CPT | Mod: 95 | Performed by: STUDENT IN AN ORGANIZED HEALTH CARE EDUCATION/TRAINING PROGRAM

## 2021-03-17 NOTE — TELEPHONE ENCOUNTER
Called Rafaela, community paramedic to follow up on labs, she was unable to draw. Tried 4 times. Called Mariel, made appointment at [place] lab for tomorrow. Rosina Mays RN

## 2021-03-17 NOTE — PROGRESS NOTES
[facility] CORE Clinic - CardioMEMS Reading Review    March 17, 2021   CardioMEMS reviewed. PAD within goal range.   Current diuretic: Bumex 4 mg daily  Recent plan of care changes: none    Current Threshold parameters:       Today's Waveform:       Readings:
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
## 2021-03-18 DIAGNOSIS — I50.32 CHRONIC DIASTOLIC HEART FAILURE (H): ICD-10-CM

## 2021-03-18 DIAGNOSIS — N18.4 CKD (CHRONIC KIDNEY DISEASE) STAGE 4, GFR 15-29 ML/MIN (H): ICD-10-CM

## 2021-03-18 LAB
ERYTHROCYTE [DISTWIDTH] IN BLOOD BY AUTOMATED COUNT: 12.5 % (ref 10–15)
HCT VFR BLD AUTO: 39.9 % (ref 35–47)
HGB BLD-MCNC: 13.1 G/DL (ref 11.7–15.7)
MCH RBC QN AUTO: 29 PG (ref 26.5–33)
MCHC RBC AUTO-ENTMCNC: 32.8 G/DL (ref 31.5–36.5)
MCV RBC AUTO: 89 FL (ref 78–100)
PLATELET # BLD AUTO: 106 10E9/L (ref 150–450)
PTH-INTACT SERPL-MCNC: 71 PG/ML (ref 18–80)
RBC # BLD AUTO: 4.51 10E12/L (ref 3.8–5.2)
WBC # BLD AUTO: 6.5 10E9/L (ref 4–11)

## 2021-03-18 PROCEDURE — 85027 COMPLETE CBC AUTOMATED: CPT | Performed by: INTERNAL MEDICINE

## 2021-03-18 PROCEDURE — 83970 ASSAY OF PARATHORMONE: CPT | Performed by: INTERNAL MEDICINE

## 2021-03-18 PROCEDURE — 80069 RENAL FUNCTION PANEL: CPT | Performed by: INTERNAL MEDICINE

## 2021-03-18 PROCEDURE — 82306 VITAMIN D 25 HYDROXY: CPT | Performed by: INTERNAL MEDICINE

## 2021-03-18 PROCEDURE — 36415 COLL VENOUS BLD VENIPUNCTURE: CPT | Performed by: INTERNAL MEDICINE

## 2021-03-19 ENCOUNTER — PATIENT OUTREACH (OUTPATIENT)
Dept: NURSING | Facility: CLINIC | Age: 76
End: 2021-03-19
Payer: MEDICARE

## 2021-03-19 ENCOUNTER — PATIENT OUTREACH (OUTPATIENT)
Dept: CARDIOLOGY | Facility: CLINIC | Age: 76
End: 2021-03-19

## 2021-03-19 LAB
ALBUMIN SERPL-MCNC: 3.4 G/DL (ref 3.4–5)
ANION GAP SERPL CALCULATED.3IONS-SCNC: 4 MMOL/L (ref 3–14)
BUN SERPL-MCNC: 32 MG/DL (ref 7–30)
CALCIUM SERPL-MCNC: 9.4 MG/DL (ref 8.5–10.1)
CHLORIDE SERPL-SCNC: 107 MMOL/L (ref 94–109)
CO2 SERPL-SCNC: 28 MMOL/L (ref 20–32)
CREAT SERPL-MCNC: 1.36 MG/DL (ref 0.52–1.04)
DEPRECATED CALCIDIOL+CALCIFEROL SERPL-MC: 107 UG/L (ref 20–75)
GFR SERPL CREATININE-BSD FRML MDRD: 38 ML/MIN/{1.73_M2}
GLUCOSE SERPL-MCNC: 174 MG/DL (ref 70–99)
PHOSPHATE SERPL-MCNC: 3.5 MG/DL (ref 2.5–4.5)
POTASSIUM SERPL-SCNC: 3.7 MMOL/L (ref 3.4–5.3)
SODIUM SERPL-SCNC: 139 MMOL/L (ref 133–144)

## 2021-03-19 ASSESSMENT — ACTIVITIES OF DAILY LIVING (ADL): DEPENDENT_IADLS:: SHOPPING

## 2021-03-19 NOTE — TELEPHONE ENCOUNTER
Called Mariel to review BMP labs which are stable. Reviewed with Yuni SCHAEFER, no changes at this time. Mariel verbalized understanding. Rosina Mays RN

## 2021-03-19 NOTE — LETTER
[facility] COORDIN[facility]  [address] [place] [address]    March 22, 2021        Mariel Ribeiro  [address] [place] [address]          Dear Zonia Floyd is an updated Complex Care Plan for your continued enrollment in Care Coordination. Please let us know if you have additional questions, concerns, or goals that we can assist with.    Sincerely,    [facility]   Senia Durán RN, Care Coordinator   [facility]   E-mail [email or web address]   [phone]     [facility]  Complex Care Plan  About Me:    Patient Name:  Mariel Ribeiro    YOB: 1945  Age:         75 year old   [place] MRN:    [id number] Telephone Information:  Home Phone [phone]   Mobile [phone]       Address:  [address] [place] [address] Email address:  [email or web address]      Emergency Contact(s)    Name Relationship Lgl Grd Work Phone Home Phone Mobile Phone   1. KOSTAS PIERSON Roommate No [phone]   2. VITA ODOM Sister No  [phone]   3. SAKSHI ALCANTAR Friend No  [phone]           Primary language:  English     needed? No   [place] [facility]:  [phone] op. 1  Other communication barriers: None  Preferred Method of Communication:  Fabian  Current living arrangement: Other, I live in a private home(i live in a house with a roommate)  Mobility Status/ Medical Equipment: Independent w/Device    Health Maintenance  Health Maintenance Reviewed: Due/Overdue   Health Maintenance Due   Topic Date Due     URINE DRUG SCREEN  Never done     COLORECTAL CANCER SCREENING  Never done     MEDICARE ANNUAL WELLNESS VISIT  08/10/2017     DIABETIC FOOT EXAM  03/19/2019     LIPID  03/13/2020     A1C  12/24/2020     PHQ-9  04/09/2021       My Access Plan  Medical Emergency 911   Primary Clinic Line [facility]  [place] - [phone]   24 Hour Appointment Line [phone] or  [phone]) (toll-free)   24 Hour Nurse Line [phone] (toll-free)   Preferred Urgent Care [facility], [phone]   Preferred [facility]  [phone]   Preferred Pharmacy [id number] - [place], [place] - [address] AT [facility] [address]     Behavioral Health Crisis Line The National Suicide Prevention Lifeline at 1-372.654.8117 or 911             My Care Team Members  Patient Care Team       Relationship Specialty Notifications Start End    Amee Connell MD PCP - General Family Practice  5/29/19     Phone: [phone] Fax: [phone]         [address] [place] [address]    Zeeshan Hutson MD MD Orthopedics  9/8/14      [facility] [address]    Jefry Hanson DPM  Podiatry  9/8/14     Phone: [phone] Fax: [phone]         [address] [facility] [address]    Tom Cruz MD MD Neurology  5/12/15     Phone: [phone] Fax: [phone]         [address] [facility] [facility] [address]    Rosina Kohler MD MD Family Medicine - Sports Medicine  11/21/16     Phone: [phone] Fax: [phone]         [address] [facility] [address]    Jeffrey Roberson MD MD General Surgery  5/22/17     Phone: [phone] Fax: [phone]         [facility] [address] [facility] [address]    Stephanie Wolf MD MD Cardiology  7/2/18     Phone: [phone] Fax: [phone]         [address] [place] [address] [address] [facility] [address]    Cathi Vaughn, APRN CNP Nurse Practitioner Cardiology Admissions 8/24/18     [facility]    Phone: [phone] Pager: [phone] Fax: [phone]        [facility] [address]    Rosina Mays, RN Nurse Coordinator  Cardiology Admissions 8/24/18     [facility]    Phone: [phone] Fax: [phone]        Shayna Newell, RN Specialty Care Coordinator Cardiology Admissions 3/13/19     Phone: [phone]         Amee Connell MD Assigned PCP   5/5/19     Phone: [phone] Fax: [phone]         [address] [place] [address]    Magdalena Hall PA-C Physician Assistant Cardiology Admissions 12/10/19     [facility]    Phone: [phone] Fax: [phone]         [address] [facility] [address]    Rafaela Cordoba, EMT Community Paramedic  Admissions 5/18/20     Phone: [phone]         Lesly Cobos, RN Specialty Care Coordinator Breast Oncology Admissions 8/23/20     Phone: [phone] Pager: [phone]        Opal Weber MD MD Breast Oncology Admissions 8/23/20     Phone: [phone] Fax: [phone]         [address] [place] [address]    Noah Girard MD Assigned Sleep Provider   10/23/20     Phone: [phone] Fax: [phone]          [address] [place] [address]    Stephanie Wolf MD Assigned Heart and Vascular Provider   10/23/20     Phone: [phone] Fax: [phone]         [address] [place] [address] [address] [facility] [address]    Anjelica Gonzales MD Assigned Surgical Provider   10/23/20     Phone: [phone] Fax: [phone]         [address] [place] [address] [address] [facility] [address]    Opal Weber MD Assigned Cancer Care Provider   10/23/20     Phone: [phone] Fax: [phone]         [address] [place] [address]    Rosina Hdz Community Health Worker Primary Care - CC Admissions 12/11/20     Phone: [phone]         Senia Durán, RN Lead Care Coordinator Primary Care - CC Admissions 12/14/20     Phone: [phone]         Radha Llanos, RN Diabetes Educator Diabetes Education  12/21/20     Phone: [phone] Fax: [phone]         [facility]  [place] [address] [facility] [address]    Dahlia Wolfe PA-C Physician Assistant Urology  12/22/20     Phone: [phone] Fax: [phone]         [facility] [address] [facility] [address] [facility] [address]    Radha Llanos, RN Diabetes Educator Diabetes Education  1/8/21     Phone: [phone] Fax: [phone]         [facility] [address] [facility] [address]    Muriel Will PA-C Physician Assistant Endocrinology, Diabetes, and Metabolism  2/10/21     Phone: [phone] Fax: [phone]         [address] [facility] [address]            My Care Plans  Self Management and Treatment Plan  Goals and (Comments)  Goals        General    Monitoring (pt-stated)     Notes - Note edited  3/22/2021  8:25 AM by Senia Durán RN    Goal Statement: I will check my blood sugar,weight, blood pressure,pulse and oxygen level daily   Date Goal set: 3/19/2021  Barriers: None identified   Strengths:Motivated   Date to Achieve By: 6/19/2021  Patient expressed understanding of goal: Yes  Action steps to achieve this goal:  1. I will call if blood pressure consistently over 140/90,oxygen consistently below 90%,pulse elevated or blood sugar is in the abnormal range(Normal range   2. I will concentrate on eating 3 healthy meals a day   3. I will keep future specialty and primary care provider appointments              Action Plans on File:            Depression          Advance Care Plans/Directives Type:   Type Advanced Care Plans/Directives: Advanced Directive - On File    My Medical and Care Information  Problem List   Patient Active Problem List   Diagnosis     Hypothyroidism     Diverticulitis of colon     Coronary atherosclerosis     Myalgia and myositis     Migraine     Chronic airway obstruction/ COPD     Mononeuritis     Obstructive sleep apnea     Vitamin D deficiency     Hypertension goal BP (blood pressure) < 130/80     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)      Hyperlipidemia with target LDL less than 70     Chronic diastolic heart failure (H)     Osteoarthritis     Morbid obesity (H)     Arthritis of knee     Transient cerebral ischemia     History of thyroid cancer     Cervicalgia     Fatty liver disease, nonalcoholic     Hepatomegaly     Angina at rest (H)     S/P CABG x 3     Type 2 diabetes mellitus with stage 3 chronic kidney disease, with long-term current use of insulin (H)     Lymphedema     Lower back pain     Bilateral carotid artery disease (H)     Spinal stenosis of lumbar region, unspecified whether neurogenic claudication present     Status post coronary angiogram     Hemoptysis     Intertrigo     Malignant neoplasm of lower-inner quadrant of left breast in female, estrogen receptor positive (H)     Pain in the coccyx     Facet arthropathy     CKD (chronic kidney disease) stage 4, GFR 15-29 ml/min (H)     Facet arthropathy, lumbar      Current Medications and Allergies:    Current Outpatient Medications   Medication     acetaminophen (TYLENOL) 325 MG tablet     albuterol (PROAIR HFA/PROVENTIL HFA/VENTOLIN HFA) 108 (90 Base) MCG/ACT inhaler     anastrozole (ARIMIDEX) 1 MG tablet     aspirin (ASA) 81 MG EC tablet     atorvastatin (LIPITOR) 40 MG tablet     blood glucose (ONETOUCH ULTRA) test strip     bumetanide (BUMEX) 1 MG tablet     buPROPion (WELLBUTRIN XL) 150 MG 24 hr tablet     capsaicin (ZOSTRIX) 0.025 % external cream     capsaicin (ZOSTRIX) 0.025 % external cream     Continuous Blood Gluc  (FREESTYLE MARTY 14 DAY READER) JEZ     Continuous Blood Gluc Sensor (FREESTYLE MARTY 14 DAY SENSOR) MISC     EPINEPHrine (ANY BX GENERIC EQUIV) 0.3 MG/0.3ML injection 2-pack     escitalopram (LEXAPRO) 20 MG tablet     ferrous gluconate (FERGON) 324 (38 Fe) MG tablet     folic acid (FOLVITE) 1 MG tablet     gabapentin (NEURONTIN) 100 MG capsule     guaiFENesin (MUCINEX) 600 MG 12 hr tablet     insulin glargine (LANTUS SOLOSTAR) 100 UNIT/ML pen     CREATIV  "FINEPOINT LANCETS MISC     losartan (COZAAR) 50 MG tablet     metoprolol succinate ER (TOPROL-XL) 25 MG 24 hr tablet     miconazole (MICATIN/MICRO GUARD) 2 % external powder     niacin ER (NIASPAN) 500 MG CR tablet     nitroGLYcerin (NITROSTAT) 0.4 MG sublingual tablet     ONE TOUCH ULTRA (DEVICES) MISC     potassium chloride ER (KLOR-CON M) 10 MEQ CR tablet     pregabalin (LYRICA) 100 MG capsule     repaglinide (PRANDIN) 1 MG tablet     Skin Protectants, Misc. ([facility] AG TEXTILE 10\"X144\") SHEE     SYNTHROID 150 MCG tablet     tolterodine ER (DETROL LA) 2 MG 24 hr capsule     traMADol (ULTRAM) 50 MG tablet     traZODone (DESYREL) 50 MG tablet     vitamin D3 (CHOLECALCIFEROL) 86778 units (250 mcg) capsule     No current facility-administered medications for this visit.      Facility-Administered Medications Ordered in Other Visits   Medication     sodium chloride (PF) 0.9% PF flush 10 mL     Care Coordination Start Date: 4/6/2020   Frequency of Care Coordination: monthly   Form Last Updated: 03/22/2021       "
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
## 2021-03-22 NOTE — PROGRESS NOTES
Clinic Care Coordination Contact    Follow Up Progress Note      Assessment: Patient is checking her weight,blood sugar ,blood pressures Pulse Oximeter daily.  Readings below :    Oximeter consistently over 90%  Blood sugar is checked in the morning  Readings : [phone]) (day of COVID vaccine didn't eat) [phone]  Blood pressure readings : 133//59- one isolated high reading 231/86 Possibly the day she received the COVID vaccine)   Weight range 313-315#  Pulse 59-81    Goals addressed this encounter:   Goals Addressed                 This Visit's Progress      Monitoring (pt-stated)   20%     Goal Statement: I will check my blood sugar,weight, blood pressure,pulse and oxygen level daily   Date Goal set: 3/19/2021  Barriers: None identified   Strengths:Motivated   Date to Achieve By: 6/19/2021  Patient expressed understanding of goal: Yes  Action steps to achieve this goal:  1. I will call if blood pressure consistently over 140/90,oxygen consistently below 90%,pulse elevated or blood sugar is in the abnormal range(Normal range   2. I will concentrate on eating 3 healthy meals a day   3. I will keep future specialty and primary care provider appointments         COMPLETED: Other (pt-stated)        Goal Statement: I would like to attend diabetic education in the next month.  Date Goal set: 12/11/2020   Barriers: Understanding diet and more  Strengths: open to scheduling  Date to Achieve By: 4/11/2021  Patient expressed understanding of goal: yes    Action steps to achieve this goal:  1. I will call diabetic ed to schedule an appointment in the next month. (completed)  2. I will attend my appointment once scheduled. (completed)  3. I will ask the CHW to reach out to the diabetic educator and follow up with me regarding questions I have about icons on my Freestyle [place].    Updated: 1/29/21             Intervention/Education provided during outreach: Continue daily vital  signs checks      Outreach Frequency: monthly    Plan:   New revised care plan sent to patient  via My Chart   CHW will follow up in 2 weeks   CCRN will review chart every 6 weeks    [facility]   Senia Durán RN, Care Coordinator   [facility] and [place]   E-mail [email or web address]   [phone]

## 2021-03-24 NOTE — PROGRESS NOTES
[facility]ORE [facility] - CardioMEMS Reading Review    March 24, 2021   CardioMEMS reviewed.  Current diuretic: Bumex 4 mg daily  Recent plan of care changes: none. Recent CORE visit and labs stable, no changes.     Current Threshold parameters:       Today's Waveform:       Readings:           RHC Date Wedge Pressure MEMS PAD during cath  (average of the 3 values)     7/1/2019 w/MEMS implant     20 mmHg   18 mmHg           Rosina Mays RN

## 2021-03-27 ENCOUNTER — ALLIED HEALTH/NURSE VISIT (OUTPATIENT)
Dept: CARDIOLOGY | Facility: CLINIC | Age: 76
End: 2021-03-27
Attending: NURSE PRACTITIONER
Payer: MEDICARE

## 2021-03-27 DIAGNOSIS — I50.32 CHRONIC DIASTOLIC HEART FAILURE (H): Primary | ICD-10-CM

## 2021-03-27 PROCEDURE — 93264 REM MNTR WRLS P-ART PRS SNR: CPT | Performed by: NURSE PRACTITIONER

## 2021-03-29 ENCOUNTER — ANCILLARY PROCEDURE (OUTPATIENT)
Dept: MAMMOGRAPHY | Facility: CLINIC | Age: 76
End: 2021-03-29
Attending: INTERNAL MEDICINE
Payer: MEDICARE

## 2021-03-29 DIAGNOSIS — I50.32 CHRONIC DIASTOLIC HEART FAILURE (H): Chronic | ICD-10-CM

## 2021-03-29 DIAGNOSIS — D05.12 DUCTAL CARCINOMA IN SITU (DCIS) OF LEFT BREAST: ICD-10-CM

## 2021-03-29 PROCEDURE — 77065 DX MAMMO INCL CAD UNI: CPT | Performed by: RADIOLOGY

## 2021-03-29 PROCEDURE — G0279 TOMOSYNTHESIS, MAMMO: HCPCS | Performed by: RADIOLOGY
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
## 2021-03-30 ENCOUNTER — VIRTUAL VISIT (OUTPATIENT)
Dept: ONCOLOGY | Facility: CLINIC | Age: 76
End: 2021-03-30
Attending: INTERNAL MEDICINE
Payer: MEDICARE

## 2021-03-30 DIAGNOSIS — R92.8 ABNORMAL FINDING ON BREAST IMAGING: Primary | ICD-10-CM

## 2021-03-30 DIAGNOSIS — D05.12 DUCTAL CARCINOMA IN SITU (DCIS) OF LEFT BREAST: ICD-10-CM

## 2021-03-30 DIAGNOSIS — Z17.0 MALIGNANT NEOPLASM OF LOWER-INNER QUADRANT OF LEFT BREAST IN FEMALE, ESTROGEN RECEPTOR POSITIVE (H): Primary | ICD-10-CM

## 2021-03-30 DIAGNOSIS — E66.01 MORBID OBESITY (H): ICD-10-CM

## 2021-03-30 DIAGNOSIS — C50.312 MALIGNANT NEOPLASM OF LOWER-INNER QUADRANT OF LEFT BREAST IN FEMALE, ESTROGEN RECEPTOR POSITIVE (H): Primary | ICD-10-CM

## 2021-03-30 DIAGNOSIS — Z79.811 LONG TERM (CURRENT) USE OF AROMATASE INHIBITORS: ICD-10-CM

## 2021-03-30 PROCEDURE — 99443 PR PHYSICIAN TELEPHONE EVALUATION 21-30 MIN: CPT | Mod: 95 | Performed by: INTERNAL MEDICINE

## 2021-03-30 PROCEDURE — 999N001193 HC VIDEO/TELEPHONE VISIT; NO CHARGE

## 2021-03-30 RX ORDER — LOSARTAN POTASSIUM 50 MG/1
25 TABLET ORAL DAILY
Qty: 45 TABLET | Refills: 1 | OUTPATIENT
Start: 2021-03-30

## 2021-03-30 NOTE — PROGRESS NOTES
Remote monitoring of Pulmonary Artery Pressures via CardioMEMS device reviewed at least weekly and as needed with CORE nursing. Diuretics adjusted accordingly.    billing dates of 2/25/21 through 3/26/21    [place] [facility]
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
## 2021-03-31 ENCOUNTER — TELEPHONE (OUTPATIENT)
Dept: FAMILY MEDICINE | Facility: CLINIC | Age: 76
End: 2021-03-31

## 2021-03-31 ENCOUNTER — ALLIED HEALTH/NURSE VISIT (OUTPATIENT)
Dept: OTHER | Facility: CLINIC | Age: 76
End: 2021-03-31
Payer: MEDICARE

## 2021-03-31 VITALS
OXYGEN SATURATION: 94 % | TEMPERATURE: 96.8 F | DIASTOLIC BLOOD PRESSURE: 72 MMHG | HEART RATE: 56 BPM | SYSTOLIC BLOOD PRESSURE: 102 MMHG

## 2021-03-31 DIAGNOSIS — M54.50 LOWER BACK PAIN: Primary | ICD-10-CM

## 2021-03-31 PROCEDURE — 99207 PR COMMUNITY PARAMEDIC - PATIENT NOT BILLABLE: CPT

## 2021-03-31 NOTE — PROGRESS NOTES
Community Paramedics Follow-up Visit  March 31, 2021  TIME: 1130    Mariel Ribeiro is a 75 year old female being seen at home for a follow-up visit.    Present at appointment:  Patient, roomate         Chief Complaint   Patient presents with     Outreach       [place] Utilization:      Utilization    Last refreshed: 3/30/2021  3:42 PM: Hospital Admissions 0           Last refreshed: 3/30/2021  3:42 PM: ED Visits 1           Last refreshed: 3/30/2021  3:42 PM: No Show Count (past year) 2              Current as of: 3/30/2021  3:42 PM              /72   Pulse 56   Temp 96.8  F (36  C)   SpO2 94%     Clinical Concerns:  Current Medical Concerns:  Fall, Diabetes, breast mass    Current Behavioral Concerns: none    Education Provided to patient: no   Medication set up? No  Pill Box issued: No  Scale issued: No  Flu Shot given: No  Lab draw or specimen collection: No  Food box issued: No  Collaborative visit with PCP: No    Health Maintenance Reviewed:      Clinical Pathway: None    No  Face to Face in Home / Community      Review of Symptoms/PE    Skin: negative  Eyes: visual blurring L eye  Ears/Nose/Throat: negative  Respiratory: Dyspnea on exertion-   Cardiovascular: negative  Gastrointestinal: negative  Genitourinary: negative  Musculoskeletal: back pain  Neurologic: numbness or tingling of hands and memory problems  Psychiatric: negative    Pain Management::                 Plan:     Time spent with patient: 60    The patient meets one or more of the following criteria:  * Requires services to prevent readmission to a nursing home or hospital    Acute concern/Follow-up recommendations: Continue tx plan    Next CP visit scheduled: 4/14/21    Issues for Provider to follow up on: The pt reports falling last night at 0100hrs.  She is unsure if she had a LOC.  She reports hitting her head on a walker that is in the room and was able to get back up on her own.  She reports pain on the L side of her head with a  small hematoma palpated.  She has vision issues but reports that she has increased blurred vision in her L eye.  Pupils are equal and reactive.  Pt denies any new neck or back pain and was ambulatory upon the Community Paramedic arrival.  Mariel is alert and oriented but has some memory issues which have invreased per the pt roommate Odalis.  The pt does not want to go to the ER.  The triage nurse at the pts clinic [facility] was contacted and they stated they would talk to a PCP and call her back.    Provider follow up visit needed: Multiple upcoming.

## 2021-03-31 NOTE — TELEPHONE ENCOUNTER
Called patient and reviewed recommendation per Dr. Rice.    Patient verbalized understanding and declined ER evaluation stating she does not have a ride and if vision does not clear up in another day she will go to ER.    Writer stressed importance of prompt evaluation given persistent blurred vision and recommended 911 if no transportation via friend/family member available today.    Patient declined calling 911.    SONIA RobertsN, RN  [facility]

## 2021-03-31 NOTE — TELEPHONE ENCOUNTER
Community Paramedic called to notify Dr Connell: Pt fell last night, states she is having blurred vision and small bruise on left side of head. Stats she has blurred vision in both eyes but left is worse today. Alert and oriented, pupils equal and reactive, strength on both sides equal. /62- Takes asa daily- no other blood thinners. Should  be evaluated in ED/urgent care? Tricia Robles RN

## 2021-04-01 NOTE — PROGRESS NOTES
[facility] - CardioMEMS Reading Review    April 1, 2021   CardioMEMS reviewed.  Current diuretic: Bumex 4 mg daily  Recent plan of care changes: none. PAD within goal range.     Current Threshold parameters:       Today's Waveform:       Readings:           RHC Date Wedge Pressure MEMS PAD during cath  (average of the 3 values)     7/1/2019 w/MEMS implant     20 mmHg   18 mmHg           Rosina Mays RN

## 2021-04-02 ENCOUNTER — TELEPHONE (OUTPATIENT)
Dept: NEPHROLOGY | Facility: CLINIC | Age: 76
End: 2021-04-02

## 2021-04-02 DIAGNOSIS — R92.8 ABNORMAL FINDING ON BREAST IMAGING: ICD-10-CM

## 2021-04-02 LAB
SARS-COV-2 RNA RESP QL NAA+PROBE: NORMAL
SPECIMEN SOURCE: NORMAL

## 2021-04-02 PROCEDURE — U0005 INFEC AGEN DETEC AMPLI PROBE: HCPCS | Performed by: INTERNAL MEDICINE

## 2021-04-02 PROCEDURE — U0003 INFECTIOUS AGENT DETECTION BY NUCLEIC ACID (DNA OR RNA); SEVERE ACUTE RESPIRATORY SYNDROME CORONAVIRUS 2 (SARS-COV-2) (CORONAVIRUS DISEASE [COVID-19]), AMPLIFIED PROBE TECHNIQUE, MAKING USE OF HIGH THROUGHPUT TECHNOLOGIES AS DESCRIBED BY CMS-2020-01-R: HCPCS | Performed by: INTERNAL MEDICINE

## 2021-04-05 NOTE — PROGRESS NOTES
[facility] - CardioMEMS Reading Review    April 5, 2021   CardioMEMS reviewed.  Current diuretic: Bumex 4 mg daily  Recent plan of care changes: PAD above parameters, will check back tomorrow to look for trends.      Current Threshold parameters:       Today's Waveform:       Readings:           RHC Date Wedge Pressure MEMS PAD during cath  (average of the 3 values)     7/1/2019 w/MEMS implant     20 mmHg   18 mmHg           Latonia Hdz RN

## 2021-04-06 ENCOUNTER — ANCILLARY PROCEDURE (OUTPATIENT)
Dept: MAMMOGRAPHY | Facility: CLINIC | Age: 76
End: 2021-04-06
Attending: INTERNAL MEDICINE
Payer: MEDICARE

## 2021-04-06 DIAGNOSIS — D05.12 DUCTAL CARCINOMA IN SITU (DCIS) OF LEFT BREAST: ICD-10-CM

## 2021-04-06 DIAGNOSIS — N63.0 BREAST MASS: Primary | ICD-10-CM

## 2021-04-06 PROCEDURE — 19083 BX BREAST 1ST LESION US IMAG: CPT | Mod: LT | Performed by: RADIOLOGY

## 2021-04-06 PROCEDURE — 88305 TISSUE EXAM BY PATHOLOGIST: CPT | Performed by: PATHOLOGY

## 2021-04-06 NOTE — PROGRESS NOTES
[facility] - CardioMEMS Reading Review    April 6, 2021   CardioMEMS reviewed.  Current diuretic: Bumex 4 mg daily  Recent plan of care changes:  PAD elevated above threshold.  Spoke to Mariel- she reports no weight gain, swelling and breathing are at baseline.      Current Threshold parameters:       Today's Waveform:       Readings:           RHC Date Wedge Pressure MEMS PAD during cath  (average of the 3 values)     7/1/2019 w/MEMS implant     20 mmHg   18 mmHg           Latonia Hdz RN

## 2021-04-06 NOTE — PROGRESS NOTES
Date: 4/6/2021    Time of Call: 5:25 PM     Diagnosis:  Heart failure     [ VORB ] Ordering provider: Austin Miguel NP    Order: increase Bumex to 4 mg in AM and 2 mg in afternoon for 3 days     Order received by: Latonia dHz RN       Follow-up/additional notes: spoke with Mariel and her roommate, they stated understanding

## 2021-04-08 ENCOUNTER — TELEPHONE (OUTPATIENT)
Dept: MAMMOGRAPHY | Facility: CLINIC | Age: 76
End: 2021-04-08

## 2021-04-08 LAB — COPATH REPORT: NORMAL

## 2021-04-08 NOTE — TELEPHONE ENCOUNTER
Spoke to Mariel and Odalis about the benign finding from her breast biopsy done earlier this week.  We discussed the Radiologist's recommendation of clinical follow up for any changes in the area and to continue on her breast imaging plan.  Mariel and Odalis verbalized understanding and all questions and concerns were answered at this time.

## 2021-04-09 NOTE — PROGRESS NOTES
[facility] - CardioMEMS Reading Review    April 9, 2021   CardioMEMS reviewed.  Current diuretic: Bumex 4 mg daily  Recent plan of care changes:  Recent increase in bumex for PAD above threshold.  PAD now WNL- updated Mariel, she has returned to the Bumex 4 mg daily    Current Threshold parameters:       Today's Waveform:       Readings:           RH Date Wedge Pressure MEMS PAD during cath  (average of the 3 values)     7/1/2019 w/MEMS implant     20 mmHg   18 mmHg           Latonia Hdz RN

## 2021-04-12 ENCOUNTER — PATIENT OUTREACH (OUTPATIENT)
Dept: CARE COORDINATION | Facility: CLINIC | Age: 76
End: 2021-04-12

## 2021-04-12 NOTE — PROGRESS NOTES
"Clinic Care Coordination Contact  Community Health Worker Follow Up    Goals:   Goals Addressed as of 4/12/2021 at 1:24 PM                 3/19/21       Patient Stated      Monitoring (pt-stated)   20%    Added 3/22/21 by Senia Durán, JODY     Goal Statement: In the next three months, I will check my blood sugar, weight, blood pressure, pulse and oxygen level daily.   Date Goal set: 3/19/2021  Barriers: None identified   Strengths:Motivated   Date to Achieve By: 6/19/2021  Patient expressed understanding of goal: Yes    Action steps to achieve this goal:  1. I will call the clinic if my blood pressure is consistently over 140/90, my oxygen is consistently below 90%, my pulse is elevated or my blood sugar is in the abnormal range (Normal range ).  2. I will continue to focus on eating 3 healthy meals every day.  3. I will schedule and attend future specialty and primary care provider appointments.   4. I will give the CHW updates at outreach calls.    Updated: 4/12/21            Intervention and Education during outreach:     Called pt to follow up. Noted her call last month with CC RN and asked how pt has been doing since then. She said \"I'm hanging in there.\" Checked in with pt regarding her goal of checking her blood sugar, pulse, oxygen level, weight and blood pressure. I asked if she has continued monitoring these daily, and she said \"yes, pretty much every day.\" I asked if pt had any concerns or questions about the readings she's getting, and she said no. Thanked her for sharing with me. Asked if she felt like discussing eating three healthy meals a day, and she said \"I'm doing the best I can.\" I asked if pt would be interested in any resources for recipes or healthy eating, and she declined at this time.     Noted pt's recent virtual visits and communications with oncology, cardiology and nephrology. I also noted that she has the Community Paramedics coming to visit her and that they're coming to pt's " "home on Wednesday. Pt said she has been \"trying to keep up with all the providers\" and did not express any concerns with attending or keeping her appointments. Thanked her for letting me know and encouraged her to reach out if this changes in the future. Updated goal.    Asked if pt had any other questions or concerns today, and she declined. Confirmed that pt has my contact information and again encouraged her to contact me with any updates or questions. She said she would and thanked me for the call.    CHW Plan: Pt will continue monitoring her blood sugar and pressure, pulse, oxygen level and weight. Pt will continue attending her visits and communicating with her providers. Pt will visit with the Community Paramedic this week. Pt will contact the CHW with updates or questions before next outreach. CHW will follow up with pt in one month.   "

## 2021-04-14 ENCOUNTER — PATIENT OUTREACH (OUTPATIENT)
Dept: OTHER | Facility: CLINIC | Age: 76
End: 2021-04-14

## 2021-04-14 ENCOUNTER — OFFICE VISIT (OUTPATIENT)
Dept: OTHER | Facility: CLINIC | Age: 76
End: 2021-04-14
Payer: MEDICARE

## 2021-04-14 VITALS
OXYGEN SATURATION: 95 % | DIASTOLIC BLOOD PRESSURE: 50 MMHG | RESPIRATION RATE: 16 BRPM | SYSTOLIC BLOOD PRESSURE: 138 MMHG | HEART RATE: 57 BPM

## 2021-04-14 DIAGNOSIS — M54.50 BILATERAL LOW BACK PAIN, UNSPECIFIED CHRONICITY, UNSPECIFIED WHETHER SCIATICA PRESENT: Primary | Chronic | ICD-10-CM

## 2021-04-14 PROCEDURE — 99207 PR COMMUNITY PARAMEDIC - PATIENT NOT BILLABLE: CPT

## 2021-04-14 NOTE — PROGRESS NOTES
"Community Paramedics Follow-up Visit  April 14, 2021  TIME: ***    Mariel Ribeiro is a 75 year old female being seen at home for a follow-up visit.    Present at appointment:           Chief Complaint   Patient presents with     Outreach       [place] Utilization: ***     Utilization    Last refreshed: 4/13/2021  6:42 PM: Hospital Admissions 0           Last refreshed: 4/13/2021  6:42 PM: ED Visits 1           Last refreshed: 4/13/2021  6:42 PM: No Show Count (past year) 2              Current as of: 4/13/2021  6:42 PM              There were no vitals taken for this visit.    Clinical Concerns:  Current Medical Concerns:  ***    Current Behavioral Concerns: ***    Education Provided to patient: ***   Medication set up? {Yes/No:185281}  Pill Box issued: {Yes/No:160556}  Scale issued: {Yes/No:493797}  Flu Shot given: {Yes/No:302971}  Lab draw or specimen collection: {Yes/No:980075}  Food box issued: {Yes/No:586498}  Collaborative visit with PCP: {Yes/No:461428}    Health Maintenance Reviewed:      Clinical Pathway: {Clinical Pathway:099207}    {[facility] Patient Yes / No:863714}  {[facility] Type of Contact:37426605}    Recent blood sugars: 80 fasting    Review of Symptoms/PE    Skin: {Skin:100::\"negative\"}  Eyes: {Eyes:200::\"negative\"}  Ears/Nose/Throat: {ENT:300::\"negative\"}  Respiratory: {Resp:400::\"No shortness of breath, dyspnea on exertion, cough, or hemoptysis\"}  Cardiovascular: {CV:500::\"negative\"}  Gastrointestinal: {GI:600::\"negative\"}  Genitourinary: {:700::\"negative\"}  Musculoskeletal: {Musc:800::\"negative\"}  Neurologic: {Neur:900::\"negative\"}  Psychiatric: {Psych:1000::\"negative\"}    Pain Management::                 Plan:     Time spent with patient: {MINUTES:085497}    The patient meets one or more of the following criteria:  {cpbillingcriteria:521052}    Acute concern/Follow-up recommendations: ***    Next CP visit scheduled: ***    Issues for Provider to follow up on: Memory loss is concerning.  Recent " /50, 116/50, 138/48, 118/49 Going to chiro tomorrow?  Fiol Gabapentin?    Provider follow up visit needed: ***

## 2021-04-14 NOTE — PROGRESS NOTES
Community Paramedics Follow-up Visit  April 14, 2021  TIME: 1300    Mariel Ribeiro is a 75 year old female being seen at home for a follow-up visit.    Present at appointment: patient, roommate           Chief Complaint   Patient presents with     Outreach     Community Paramedic appointment       [place] Utilization:      Utilization    Last refreshed: 4/14/2021  5:23 PM: Hospital Admissions 0           Last refreshed: 4/14/2021  5:23 PM: ED Visits 1           Last refreshed: 4/14/2021  5:23 PM: No Show Count (past year) 2              Current as of: 4/14/2021  5:23 PM              There were no vitals taken for this visit.    Clinical Concerns:  Current Medical Concerns:  Memory loss, back pain    Current Behavioral Concerns: Medication compliance    Education Provided to patient: yes    Medication set up? No  Pill Box issued: No  Scale issued: No  Flu Shot given: No  Lab draw or specimen collection: No  Food box issued: No  Collaborative visit with PCP: No    Health Maintenance Reviewed:      Clinical Pathway: None    No  Face to Face in Home / Community    Recent blood sugars: 80 fasting    Review of Symptoms/PE    Skin: negative  Eyes: visual blurring  Ears/Nose/Throat: negative  Respiratory: Dyspnea on exertion-   Cardiovascular: negative  Gastrointestinal: negative  Genitourinary: negative  Musculoskeletal: back pain  Neurologic: numbness or tingling of hands, numbness or tingling of feet and memory problems  Psychiatric: depression    Pain Management::                 Plan:     Time spent with patient: 60    The patient meets one or more of the following criteria:  * Requires services to prevent readmission to a nursing home or hospital    Acute concern/Follow-up recommendations: Continue tx plan    Next CP visit scheduled: 4/28/21    Issues for Provider to follow up on:   Recent /50, 116/50, 138/48, 118/49.      Mariel is very concerned about her memory loss.  She states it is getting worse trying  to remember names, faces, and times.  She also reports having difficulty finding her words, which the Community Paramedic observed during our conversation.  She would be in the middle of a story and forget what she was talking about on multiple occasions.  Her roommate also has concerns about the decline.      Mariel is not taking the Gabapentin recently prescribed by Dr Greene, due reading the side effects, which include increased thoughts about suicide.  She reports having those thoughts in the past and does not want to take a chance of that occurring again and would like to have the Gabapentin taken off of her medication list.    The pt has an appointment with a Chiropractor tomorrow, 4/15/21, for an adjustment and acupuncture. Her roommate Odalis asked about my opinion with this appointment which includes bringing in Mariel's X rays of her back.  I advised that I was not able to give any advice on this matter and she should contact Mariel's PCP.  I also said that this writer will reach out as well.  They said they will at least get the acupuncture treatment tomorrow.        Provider follow up visit needed: Multiple upcoming.

## 2021-04-19 ENCOUNTER — PATIENT OUTREACH (OUTPATIENT)
Dept: OTHER | Facility: CLINIC | Age: 76
End: 2021-04-19

## 2021-04-19 DIAGNOSIS — R53.83 OTHER FATIGUE: ICD-10-CM

## 2021-04-19 DIAGNOSIS — E55.9 VITAMIN D DEFICIENCY: ICD-10-CM

## 2021-04-19 DIAGNOSIS — E89.0 POSTOPERATIVE HYPOTHYROIDISM: Chronic | ICD-10-CM

## 2021-04-19 DIAGNOSIS — E11.9 TYPE 2 DIABETES MELLITUS WITHOUT COMPLICATION, WITH LONG-TERM CURRENT USE OF INSULIN (H): ICD-10-CM

## 2021-04-19 DIAGNOSIS — R41.0 CONFUSION: Primary | ICD-10-CM

## 2021-04-19 DIAGNOSIS — Z79.4 TYPE 2 DIABETES MELLITUS WITHOUT COMPLICATION, WITH LONG-TERM CURRENT USE OF INSULIN (H): ICD-10-CM

## 2021-04-19 NOTE — PROGRESS NOTES
The Community Paramedic called the pt and her roommate to discuss her confusion and memory issues.  We went over some questions that Dr Connell has about her symptoms including, is this acute or ongoing. Mariel and Odalis think it has been ongoing and fluctuates depending on time of day, activity and stress, which the Community Paramedic agrees, from our previous home visits.  However Mariel feels that the confusion has increased since a fall she had on 3/31/21 and was advised to go to the ER, which she declined. They decided they would like to start with a possible blood draw and appointment with Dr Connell before going to Neuro psych if PCP feels appropriate.  They report she gets very tired with in person appointments. They also have questions about what a neuro psych evaluation will include.    I advised the pt that either myself or the clinic will reach out about her next steps as directed by her PCP.      Rafaela ROMAN, NRP  Community Paramedic  Phone number [phone]  Email [email or web address]

## 2021-04-21 ENCOUNTER — PATIENT OUTREACH (OUTPATIENT)
Dept: CARE COORDINATION | Facility: CLINIC | Age: 76
End: 2021-04-21

## 2021-04-21 ASSESSMENT — ACTIVITIES OF DAILY LIVING (ADL): DEPENDENT_IADLS:: SHOPPING

## 2021-04-21 NOTE — PROGRESS NOTES
Care Coordination Clinician Chart Review  Situation: Patient chart reviewed by care coordinator.       Background: Care Coordination initial assessment and enrollment to Care Coordination was 4/6/2020   Patient centered goals were developed with participation from patient.  RN CC handed patient off to CHW for continued outreach every 30 days.        Assessment: Per chart review, patient outreach completed by CC CHW on 4/12/2021.  Patient is actively working to accomplish goal.  Patient's goal remains appropriate and relevant at this time.   Patient is not  due for updated Complex Care Plan.  Annual assessment will be due at next RN outreach in a month .      Goals       Monitoring (pt-stated)      Goal Statement: In the next three months, I will check my blood sugar, weight, blood pressure, pulse and oxygen level daily.   Date Goal set: 3/19/2021  Barriers: None identified   Strengths:Motivated   Date to Achieve By: 6/19/2021  Patient expressed understanding of goal: Yes    Action steps to achieve this goal:  1. I will call the clinic if my blood pressure is consistently over 140/90, my oxygen is consistently below 90%, my pulse is elevated or my blood sugar is in the abnormal range (Normal range ).  2. I will continue to focus on eating 3 healthy meals every day.  3. I will schedule and attend future specialty and primary care provider appointments.   4. I will give the CHW updates at outreach calls.    Updated: 4/12/21                Plan/Recommendations: The patient will continue working with Care Coordination to achieve goal as above.  CHW will involve RN CC  CC as needed or if patient is ready to move to maintenance.  CC RN  CC will continue to monitor progress to goals and CHW outreaches every 6 weeks.   Care Plan updated and mailed to patient: Katie    [facility]   Senia Durán RN, Care Coordinator   [facility]   E-mail [email or web address]    [phone]

## 2021-04-21 NOTE — PROGRESS NOTES
[facility] - CardioMEMS Reading Review    April 21, 2021   CardioMEMS reviewed.  Current diuretic: Bumex 4 mg daily  Recent plan of care changes: none. PAD within goal range.     Current Threshold parameters:       Today's Waveform:       Readings:           RHC Date Wedge Pressure MEMS PAD during cath  (average of the 3 values)     7/1/2019 w/MEMS implant     20 mmHg   18 mmHg           Rosina Mays RN

## 2021-04-22 NOTE — TELEPHONE ENCOUNTER
Called patient: Left message to call back and ask to speak with an available triage nurse.    PILAR Roberts, RN  [facility]

## 2021-04-26 ENCOUNTER — ALLIED HEALTH/NURSE VISIT (OUTPATIENT)
Dept: CARDIOLOGY | Facility: CLINIC | Age: 76
End: 2021-04-26
Attending: NURSE PRACTITIONER
Payer: MEDICARE

## 2021-04-26 DIAGNOSIS — M25.512 PAIN IN JOINT OF LEFT SHOULDER: ICD-10-CM

## 2021-04-26 DIAGNOSIS — I50.32 CHRONIC DIASTOLIC HEART FAILURE (H): Primary | ICD-10-CM

## 2021-04-26 PROCEDURE — 93264 REM MNTR WRLS P-ART PRS SNR: CPT | Performed by: NURSE PRACTITIONER

## 2021-04-26 RX ORDER — PREGABALIN 100 MG/1
CAPSULE ORAL
Qty: 180 CAPSULE | Refills: 1 | Status: SHIPPED | OUTPATIENT
Start: 2021-04-26 | End: 2021-12-01

## 2021-04-26 NOTE — TELEPHONE ENCOUNTER
Dr. Connell-Please sign if agree and may close encounter.    Routing refill request to provider for review/approval because:  Drug not on the [facility] refill protocol     Thank you!  SONIA RobertsN, RN  [facility]

## 2021-04-26 NOTE — TELEPHONE ENCOUNTER
Patient called to inquire about the status of this request. States she has been out for one week and requested it in advance. Please follow up. Thanks!

## 2021-04-27 ENCOUNTER — MYC MEDICAL ADVICE (OUTPATIENT)
Dept: FAMILY MEDICINE | Facility: CLINIC | Age: 76
End: 2021-04-27

## 2021-04-27 DIAGNOSIS — I25.10 ATHEROSCLEROSIS OF NATIVE CORONARY ARTERY WITHOUT ANGINA PECTORIS, UNSPECIFIED WHETHER NATIVE OR TRANSPLANTED HEART: ICD-10-CM

## 2021-04-27 NOTE — PROGRESS NOTES
Remote monitoring of Pulmonary Artery Pressures via CardioMEMS device reviewed at least weekly and as needed with CORE nursing. Diuretics adjusted accordingly.        Billing cycle 3.27.21 through 4.25.21complete.     [place] [facility]

## 2021-04-28 ENCOUNTER — ALLIED HEALTH/NURSE VISIT (OUTPATIENT)
Dept: OTHER | Facility: CLINIC | Age: 76
End: 2021-04-28
Payer: MEDICARE

## 2021-04-28 VITALS — RESPIRATION RATE: 16 BRPM | OXYGEN SATURATION: 97 % | HEART RATE: 71 BPM | TEMPERATURE: 97.4 F

## 2021-04-28 DIAGNOSIS — R41.0 CONFUSION: Primary | ICD-10-CM

## 2021-04-28 PROCEDURE — 99207 PR COMMUNITY PARAMEDIC - PATIENT NOT BILLABLE: CPT

## 2021-04-28 RX ORDER — ATORVASTATIN CALCIUM 40 MG/1
40 TABLET, FILM COATED ORAL DAILY
Qty: 90 TABLET | Refills: 0 | Status: SHIPPED | OUTPATIENT
Start: 2021-04-28 | End: 2021-05-28

## 2021-04-28 ASSESSMENT — ACTIVITIES OF DAILY LIVING (ADL): DEPENDENT_IADLS:: SHOPPING

## 2021-04-28 NOTE — PROGRESS NOTES
Community Paramedics Follow-up Visit  April 28, 2021  TIME: 1300    Mariel Ribeiro is a 75 year old female being seen at home for a follow-up visit.    Present at appointment: Patient, roommate          Chief Complaint   Patient presents with     Outreach     Community Paramedic        [place] Utilization:      Utilization    Last refreshed: 4/28/2021  1:12 PM: Hospital Admissions 0           Last refreshed: 4/28/2021  1:12 PM: ED Visits 2           Last refreshed: 4/28/2021  1:12 PM: No Show Count (past year) 2              Current as of: 4/28/2021  1:12 PM              Pulse 71   Temp 97.4  F (36.3  C)   Resp 16   SpO2 97%     Clinical Concerns:  Current Medical Concerns:  Confusion, recent fall    Current Behavioral Concerns: none    Education Provided to patient: yes, need of lipid test.   Medication set up? No  Pill Box issued: No  Scale issued: No  Flu Shot given: No  Lab draw or specimen collection: No  Food box issued: No  Collaborative visit with PCP: No    Health Maintenance Reviewed:      Clinical Pathway: None    No  Face to Face in Home / Community    Recent blood sugars: 195    Review of Symptoms/PE    Skin: negative  Eyes: negative  Ears/Nose/Throat: negative  Respiratory: Dyspnea on exertion-   Cardiovascular: lower extremity edema +1  Gastrointestinal: constipation 2-3 bowel movements a week  Genitourinary: negative  Musculoskeletal: back pain and neck pain  Neurologic: numbness or tingling of hands, numbness or tingling of feet and memory problems  Psychiatric: negative    Pain Management::                 Plan:     Time spent with patient: 60    The patient meets one or more of the following criteria:  * Requires services to prevent readmission to a nursing home or hospital    Acute concern/Follow-up recommendations: Continue tx plan.    Next CP visit scheduled: 5/12/21    Issues for Provider to follow up on: Pt reports another fall in her bedroom while walking to the bathroom.  She states  she lost her balance.  She does report hitting her head and has a very small bump on the R side.  Unknown LOC, she was able to get back to the bed and her feet without assistance.      Mariel is reporting a lot of pain in her back and thinks it may be from not taking her Lyrica for a week.  She reports seeing her chiropractor for acupuncture and ultra sound.  She has not had an adjustment yet.     Provider follow up visit needed: Multiple upcoming

## 2021-04-28 NOTE — TELEPHONE ENCOUNTER
Prescription approved per [facility] Refill Protocol.    Lipid panel ordered.    Writer communicated with Rafaela Cordoba, community paramedic, regarding patient having lipid panel lab ordered, as patient requested this per Rafaela's report.    SONIA RobertsN, RN  [facility]

## 2021-04-29 DIAGNOSIS — E55.9 VITAMIN D DEFICIENCY: ICD-10-CM

## 2021-04-29 DIAGNOSIS — R41.0 CONFUSION: ICD-10-CM

## 2021-04-29 DIAGNOSIS — R53.83 OTHER FATIGUE: ICD-10-CM

## 2021-04-29 DIAGNOSIS — E11.9 TYPE 2 DIABETES MELLITUS WITHOUT COMPLICATION, WITH LONG-TERM CURRENT USE OF INSULIN (H): ICD-10-CM

## 2021-04-29 DIAGNOSIS — Z79.4 TYPE 2 DIABETES MELLITUS WITHOUT COMPLICATION, WITH LONG-TERM CURRENT USE OF INSULIN (H): ICD-10-CM

## 2021-04-29 DIAGNOSIS — I25.10 ATHEROSCLEROSIS OF NATIVE CORONARY ARTERY WITHOUT ANGINA PECTORIS, UNSPECIFIED WHETHER NATIVE OR TRANSPLANTED HEART: ICD-10-CM

## 2021-04-29 DIAGNOSIS — E89.0 POSTOPERATIVE HYPOTHYROIDISM: Chronic | ICD-10-CM

## 2021-04-29 LAB
ALBUMIN SERPL-MCNC: 3.5 G/DL (ref 3.4–5)
ALBUMIN UR-MCNC: NEGATIVE MG/DL
ALP SERPL-CCNC: 98 U/L (ref 40–150)
ALT SERPL W P-5'-P-CCNC: 23 U/L (ref 0–50)
ANION GAP SERPL CALCULATED.3IONS-SCNC: 6 MMOL/L (ref 3–14)
APPEARANCE UR: CLEAR
AST SERPL W P-5'-P-CCNC: 15 U/L (ref 0–45)
BASOPHILS # BLD AUTO: 0 10E9/L (ref 0–0.2)
BASOPHILS NFR BLD AUTO: 0.3 %
BILIRUB SERPL-MCNC: 0.4 MG/DL (ref 0.2–1.3)
BILIRUB UR QL STRIP: NEGATIVE
BUN SERPL-MCNC: 36 MG/DL (ref 7–30)
CALCIUM SERPL-MCNC: 9.6 MG/DL (ref 8.5–10.1)
CHLORIDE SERPL-SCNC: 105 MMOL/L (ref 94–109)
CHOLEST SERPL-MCNC: 212 MG/DL
CO2 SERPL-SCNC: 29 MMOL/L (ref 20–32)
COLOR UR AUTO: YELLOW
CREAT SERPL-MCNC: 1.51 MG/DL (ref 0.52–1.04)
DEPRECATED CALCIDIOL+CALCIFEROL SERPL-MC: 82 UG/L (ref 20–75)
DIFFERENTIAL METHOD BLD: ABNORMAL
EOSINOPHIL # BLD AUTO: 0.2 10E9/L (ref 0–0.7)
EOSINOPHIL NFR BLD AUTO: 4 %
ERYTHROCYTE [DISTWIDTH] IN BLOOD BY AUTOMATED COUNT: 13 % (ref 10–15)
FOLATE SERPL-MCNC: 59.8 NG/ML
GFR SERPL CREATININE-BSD FRML MDRD: 33 ML/MIN/{1.73_M2}
GLUCOSE SERPL-MCNC: 155 MG/DL (ref 70–99)
GLUCOSE UR STRIP-MCNC: NEGATIVE MG/DL
HBA1C MFR BLD: 6.8 % (ref 0–5.6)
HCT VFR BLD AUTO: 40.7 % (ref 35–47)
HDLC SERPL-MCNC: 57 MG/DL
HGB BLD-MCNC: 13.3 G/DL (ref 11.7–15.7)
HGB UR QL STRIP: NEGATIVE
KETONES UR STRIP-MCNC: NEGATIVE MG/DL
LDLC SERPL CALC-MCNC: 117 MG/DL
LEUKOCYTE ESTERASE UR QL STRIP: NEGATIVE
LYMPHOCYTES # BLD AUTO: 1.5 10E9/L (ref 0.8–5.3)
LYMPHOCYTES NFR BLD AUTO: 26.4 %
MCH RBC QN AUTO: 28.9 PG (ref 26.5–33)
MCHC RBC AUTO-ENTMCNC: 32.7 G/DL (ref 31.5–36.5)
MCV RBC AUTO: 88 FL (ref 78–100)
MONOCYTES # BLD AUTO: 0.4 10E9/L (ref 0–1.3)
MONOCYTES NFR BLD AUTO: 6.4 %
NEUTROPHILS # BLD AUTO: 3.6 10E9/L (ref 1.6–8.3)
NEUTROPHILS NFR BLD AUTO: 62.9 %
NITRATE UR QL: NEGATIVE
NONHDLC SERPL-MCNC: 155 MG/DL
PH UR STRIP: 5 PH (ref 5–7)
PLATELET # BLD AUTO: 117 10E9/L (ref 150–450)
POTASSIUM SERPL-SCNC: 3.7 MMOL/L (ref 3.4–5.3)
PROT SERPL-MCNC: 6.7 G/DL (ref 6.8–8.8)
RBC # BLD AUTO: 4.61 10E12/L (ref 3.8–5.2)
SODIUM SERPL-SCNC: 140 MMOL/L (ref 133–144)
SOURCE: NORMAL
SP GR UR STRIP: 1.01 (ref 1–1.03)
TRIGL SERPL-MCNC: 191 MG/DL
TSH SERPL DL<=0.005 MIU/L-ACNC: 7.12 MU/L (ref 0.4–4)
UROBILINOGEN UR STRIP-ACNC: 0.2 EU/DL (ref 0.2–1)
VIT B12 SERPL-MCNC: 551 PG/ML (ref 193–986)
WBC # BLD AUTO: 5.8 10E9/L (ref 4–11)

## 2021-04-29 PROCEDURE — 84443 ASSAY THYROID STIM HORMONE: CPT | Performed by: FAMILY MEDICINE

## 2021-04-29 PROCEDURE — 83036 HEMOGLOBIN GLYCOSYLATED A1C: CPT | Performed by: FAMILY MEDICINE

## 2021-04-29 PROCEDURE — 85025 COMPLETE CBC W/AUTO DIFF WBC: CPT | Performed by: FAMILY MEDICINE

## 2021-04-29 PROCEDURE — 80053 COMPREHEN METABOLIC PANEL: CPT | Performed by: FAMILY MEDICINE

## 2021-04-29 PROCEDURE — 82607 VITAMIN B-12: CPT | Performed by: FAMILY MEDICINE

## 2021-04-29 PROCEDURE — 81003 URINALYSIS AUTO W/O SCOPE: CPT | Performed by: FAMILY MEDICINE

## 2021-04-29 PROCEDURE — 36415 COLL VENOUS BLD VENIPUNCTURE: CPT | Performed by: FAMILY MEDICINE

## 2021-04-29 PROCEDURE — 80061 LIPID PANEL: CPT | Performed by: FAMILY MEDICINE

## 2021-04-29 PROCEDURE — 82746 ASSAY OF FOLIC ACID SERUM: CPT | Performed by: FAMILY MEDICINE

## 2021-04-29 PROCEDURE — 84439 ASSAY OF FREE THYROXINE: CPT | Performed by: FAMILY MEDICINE

## 2021-04-29 PROCEDURE — 82306 VITAMIN D 25 HYDROXY: CPT | Performed by: FAMILY MEDICINE

## 2021-05-01 LAB — T4 FREE SERPL-MCNC: 0.87 NG/DL (ref 0.76–1.46)

## 2021-05-02 ENCOUNTER — MYC MEDICAL ADVICE (OUTPATIENT)
Dept: FAMILY MEDICINE | Facility: CLINIC | Age: 76
End: 2021-05-02

## 2021-05-03 NOTE — TELEPHONE ENCOUNTER
Please call and explain while the TSH is slightly up- the T4 remains the same so no dose adjustment to her Synthroid 150 mcg daily-- this is felt to be otherwise stable.    I would recommend follow up with Dr. Hernandes or Dr. Greer and will facilitate the transition if the new PCP has any questions. :)

## 2021-05-03 NOTE — TELEPHONE ENCOUNTER
Dr. Connell-Please review and advise:  1. If Synthroid dose adjustment recommended?  Pharmacy pended  2. With which provider(s) do you recommend patient establish care?    Patient will also be encouraged to schedule a follow up visit, per review of 4/29/21 result note.    Thank you!  SONIA RobertsN, RN  [facility]

## 2021-05-03 NOTE — TELEPHONE ENCOUNTER
Writer responded via [employer].    SONIA RobertsN, RN  [facility]

## 2021-05-03 NOTE — PROGRESS NOTES
[facility] - CardioMEMS Reading Review    May 3, 2021   CardioMEMS reviewed.  Current diuretic: Bumex 4 mg daily  Recent plan of care changes: none. PAD in goal range.     Current Threshold parameters:       Today's Waveform:       Readings:           RHC Date Wedge Pressure MEMS PAD during cath  (average of the 3 values)     7/1/2019 w/MEMS implant     20 mmHg   18 mmHg           Rosina Mays RN

## 2021-05-04 ENCOUNTER — VIRTUAL VISIT (OUTPATIENT)
Dept: ANESTHESIOLOGY | Facility: CLINIC | Age: 76
End: 2021-05-04
Payer: MEDICARE

## 2021-05-04 DIAGNOSIS — G89.29 CHRONIC LOW BACK PAIN WITHOUT SCIATICA, UNSPECIFIED BACK PAIN LATERALITY: Primary | ICD-10-CM

## 2021-05-04 DIAGNOSIS — M54.50 CHRONIC LOW BACK PAIN WITHOUT SCIATICA, UNSPECIFIED BACK PAIN LATERALITY: Primary | ICD-10-CM

## 2021-05-04 PROCEDURE — 99213 OFFICE O/P EST LOW 20 MIN: CPT | Mod: 95

## 2021-05-04 ASSESSMENT — PAIN SCALES - GENERAL: PAINLEVEL: EXTREME PAIN (8)
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
## 2021-05-04 NOTE — PATIENT INSTRUCTIONS
Medications:    Discuss if there are any contraindications with your pharmacist about you using Voltaren gel.  Use medication as prescribed.     Recommended Follow up:      In person with Dr. Porras in 3 months or sooner if indicated.        To speak with a nurse, schedule/reschedule/cancel a clinic appointment, or request a medication refill call: [phone], option #1.    You can also reach us by [email or web address]: [email or web address]
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
## 2021-05-09 ENCOUNTER — HEALTH MAINTENANCE LETTER (OUTPATIENT)
Age: 76
End: 2021-05-09

## 2021-05-13 NOTE — PROGRESS NOTES
[facility]ORE [facility] - CardioMEMS Reading Review    May 13, 2021   CardioMEMS reviewed.  Current diuretic: Bumex 4 mg daily  Recent plan of care changes: none. PAD within goal range.     Current Threshold parameters:       Today's Waveform:       Readings:           RHC Date Wedge Pressure MEMS PAD during cath  (average of the 3 values)     7/1/2019 w/MEMS implant     20 mmHg   18 mmHg           Rosina Mays RN

## 2021-05-17 ENCOUNTER — PATIENT OUTREACH (OUTPATIENT)
Dept: NURSING | Facility: CLINIC | Age: 76
End: 2021-05-17
Payer: MEDICARE

## 2021-05-17 DIAGNOSIS — E11.22 TYPE 2 DIABETES MELLITUS WITH STAGE 3 CHRONIC KIDNEY DISEASE, WITH LONG-TERM CURRENT USE OF INSULIN, UNSPECIFIED WHETHER STAGE 3A OR 3B CKD (H): Primary | Chronic | ICD-10-CM

## 2021-05-17 DIAGNOSIS — Z79.4 TYPE 2 DIABETES MELLITUS WITH STAGE 3 CHRONIC KIDNEY DISEASE, WITH LONG-TERM CURRENT USE OF INSULIN, UNSPECIFIED WHETHER STAGE 3A OR 3B CKD (H): Primary | Chronic | ICD-10-CM

## 2021-05-17 DIAGNOSIS — N18.30 TYPE 2 DIABETES MELLITUS WITH STAGE 3 CHRONIC KIDNEY DISEASE, WITH LONG-TERM CURRENT USE OF INSULIN, UNSPECIFIED WHETHER STAGE 3A OR 3B CKD (H): Primary | Chronic | ICD-10-CM

## 2021-05-17 ASSESSMENT — ACTIVITIES OF DAILY LIVING (ADL): DEPENDENT_IADLS:: SHOPPING

## 2021-05-17 NOTE — PROGRESS NOTES
Clinic Care Coordination Contact    Follow Up Progress Note   Care Coordination initial assessment and enrollment to Care Coordination was 4/6/2020     Assessment: Patient had a recent fall going to the bathroom and hit her head,  See previous community paramedic documentation.  Patient denies a headache but she is bothered with blurry vision since she got her new glasses.  Patient called the ey e provider office and they told her nothing is wrong with her glasses.  Patient is requesting a eye provider referral in [place]   Patients blood sugar ranging     Goals addressed this encounter:   Goals Addressed                 This Visit's Progress      Monitoring (pt-stated)   30%     Goal Statement: In the next three months, I will check my blood sugar, weight, blood pressure, pulse and oxygen level daily.   Date Goal set: 3/19/2021  Barriers: None identified   Strengths:Motivated   Date to Achieve By: 7/19/2021  Patient expressed understanding of goal: Yes    Action steps to achieve this goal:  1. I will call the clinic if my blood pressure is consistently over 140/90, my oxygen is consistently below 90%, my pulse is elevated or my blood sugar is in the abnormal range (Normal range ).  2. I will continue to focus on eating 3 healthy meals every day.  3. I will schedule and attend future specialty and primary care provider appointments.   4. I will give the CHW updates at outreach calls.    Updated: 4/12/21             Intervention/Education provided during outreach: [facility]U RN is available for future questions or concerns      Outreach Frequency: monthly    Plan:   CC RN will request a new eye provider referral in [facility]  Patient will keep her CP appointment Wednesday   Care Coordinator will follow up monthly     [facility]   Senia Durán RN, Care Coordinator   [facility] [place]   E-mail [email or web address]   [phone]

## 2021-05-18 ENCOUNTER — PATIENT OUTREACH (OUTPATIENT)
Dept: CARE COORDINATION | Facility: CLINIC | Age: 76
End: 2021-05-18

## 2021-05-18 NOTE — PROGRESS NOTES
CC RN called patient back and gave referral information for an eye exam  [facility]   [address]   [address] [facility] [address]   Phone: [phone]     [facility]   Senia Durán RN, Care Coordinator   [facility] [place]   E-mail [email or web address]   [phone]

## 2021-05-18 NOTE — PROGRESS NOTES
Community Paramedic Program  Community Health Worker Follow Up    Called pt to ask if she would be willing to move up her visit with CP Rafaelaaugustus GarnettCordoba to 12:30 pm tomorrow, May 19th. Pt agreed and wrote down the new time while we were on the phone. Thanked her for her flexibility and encouraged her to reach out to me or the CP with any questions.

## 2021-05-19 ENCOUNTER — ALLIED HEALTH/NURSE VISIT (OUTPATIENT)
Dept: OTHER | Facility: CLINIC | Age: 76
End: 2021-05-19
Payer: MEDICARE

## 2021-05-19 VITALS — SYSTOLIC BLOOD PRESSURE: 110 MMHG | OXYGEN SATURATION: 94 % | DIASTOLIC BLOOD PRESSURE: 77 MMHG | HEART RATE: 55 BPM

## 2021-05-19 DIAGNOSIS — R41.0 CONFUSION: Primary | ICD-10-CM

## 2021-05-19 PROCEDURE — 99207 PR COMMUNITY PARAMEDIC - PATIENT NOT BILLABLE: CPT

## 2021-05-19 NOTE — PROGRESS NOTES
Community Paramedics Follow-up Visit  May 19, 2021  TIME: 1230    Mariel Ribeiro is a 75 year old female being seen at home for a follow-up visit.    Present at appointment:  Patient, roommate         Chief Complaint   Patient presents with     Outreach     Community Paramedic visit       [place] Utilization:      Utilization    Last refreshed: 5/19/2021  7:37 AM: Hospital Admissions 0           Last refreshed: 5/19/2021  7:37 AM: ED Visits 0           Last refreshed: 5/19/2021  7:37 AM: No Show Count (past year) 2              Current as of: 5/19/2021  7:37 AM              /77   Pulse 55   SpO2 94%     Clinical Concerns:  Current Medical Concerns:  Lump in neck, Cognitive decline, falls   Current Behavioral Concerns: none identified    Education Provided to patient: no   Medication set up? No  Pill Box issued: No  Scale issued: No  Flu Shot given: No  Lab draw or specimen collection: No  Food box issued: No  Collaborative visit with PCP: No    Health Maintenance Reviewed:      Clinical Pathway: None    No  Face to Face in Home / Community    Recent blood sugars: 116    Review of Symptoms/PE    Skin: negative  Eyes: visual blurring  Ears/Nose/Throat: positive for approx.1/2 inch mass on pt neck just R of the trachea.   Respiratory: Shortness of breath-  and Cough-   Cardiovascular: negative  Gastrointestinal: constipation  Genitourinary: negative  Musculoskeletal: back pain  Neurologic: headaches, numbness or tingling of hands, numbness or tingling of feet and memory problems  Psychiatric: depression    Pain Management::                 Plan:     Time spent with patient: 60    The patient meets one or more of the following criteria:  * Requires services to prevent readmission to a nursing home or hospital    Acute concern/Follow-up recommendations: Continue tx plan    Next CP visit scheduled: 6/2/21    Issues for Provider to follow up on: The pt reports a new lump in her neck,on the middle R side of her  trachea.  She also reports it is rapidly growing in size.  The mass is about the size of a quarter.  She reports that it does interfere with her beathing while lying down trying to sleep.    Mariel reports 3 falls in the past few weeks.  She states it is due to her knees giving out on her.  She denies any injuries from the falls.  Her back pain has improved due to seeing a chiropractor weekly.    Mariel reports that she would like a female PCP.  She states that she did like a Nurse Practitioner that she used to see before Dr Connell.    Mariel was having a difficult time following our conversation.  She stated she feels disconnected today.        Provider follow up visit needed: 6/10/21

## 2021-05-20 NOTE — PROGRESS NOTES
[facility] - CardioMEMS Reading Review    May 20, 2021   CardioMEMS reviewed.  Current diuretic: Bumex 4 mg daily  Recent plan of care changes: none. PAD in goal range.     Current Threshold parameters:       Today's Waveform:       Readings:           RHC Date Wedge Pressure MEMS PAD during cath  (average of the 3 values)     7/1/2019 w/MEMS implant     20 mmHg   18 mmHg           Rosina Mays RN

## 2021-05-20 NOTE — TELEPHONE ENCOUNTER
FUTURE VISIT INFORMATION      FUTURE VISIT INFORMATION:    Date: 6/8/21    Time: 2:20pm    [place] [facility]  REFERRAL INFORMATION:    Referring provider:  Amee Connell MD    Referring providers clinic: [facility]    Reason for visit/diagnosis  diabetic eye exam    RECORDS REQUESTED FROM:       Clinic name Comments Records Status Imaging Status   [facility] Referral 5/17/21 [facility]    [facility]/notes 12/9/16-4/8/20 [facility]

## 2021-05-24 ENCOUNTER — TELEPHONE (OUTPATIENT)
Dept: CARDIOLOGY | Facility: CLINIC | Age: 76
End: 2021-05-24

## 2021-05-24 NOTE — TELEPHONE ENCOUNTER
Pt needs to reschedule July 7 appt with annette with labs prior.    appts cancelled.     Pt still has other scans (DXA, ECHO) still scheduled.

## 2021-05-26 ENCOUNTER — ALLIED HEALTH/NURSE VISIT (OUTPATIENT)
Dept: CARDIOLOGY | Facility: CLINIC | Age: 76
End: 2021-05-26
Attending: NURSE PRACTITIONER
Payer: MEDICARE

## 2021-05-26 DIAGNOSIS — I50.32 CHRONIC DIASTOLIC HEART FAILURE (H): Primary | ICD-10-CM

## 2021-05-26 PROCEDURE — 93264 REM MNTR WRLS P-ART PRS SNR: CPT | Performed by: NURSE PRACTITIONER

## 2021-05-26 NOTE — PROGRESS NOTES
[facility] - CardioMEMS Reading Review    May 25, 2021   CardioMEMS reviewed.  Current diuretic: Bumex 4 mg daily  Recent plan of care changes: no changes. PAD in goal range.     Current Threshold parameters:       Today's Waveform:        Readings:           RHC Date Wedge Pressure MEMS PAD during cath  (average of the 3 values)     7/1/2019 w/MEMS implant     20 mmHg   18 mmHg           Rosina Mays RN

## 2021-05-27 NOTE — PROGRESS NOTES
Remote monitoring of Pulmonary Artery Pressures via CardioMEMS device reviewed at least weekly and as needed with CORE nursing. Diuretics adjusted accordingly.    Billing cycle 4/26/21 through 5/25/21    Austin CORNEJO NP-C

## 2021-05-28 DIAGNOSIS — I25.10 ATHEROSCLEROSIS OF NATIVE CORONARY ARTERY WITHOUT ANGINA PECTORIS, UNSPECIFIED WHETHER NATIVE OR TRANSPLANTED HEART: ICD-10-CM

## 2021-05-28 DIAGNOSIS — F33.41 RECURRENT MAJOR DEPRESSIVE DISORDER, IN PARTIAL REMISSION (H): ICD-10-CM

## 2021-05-28 RX ORDER — ATORVASTATIN CALCIUM 40 MG/1
TABLET, FILM COATED ORAL
Qty: 90 TABLET | Refills: 0 | Status: SHIPPED | OUTPATIENT
Start: 2021-05-28 | End: 2021-08-30

## 2021-05-28 RX ORDER — ESCITALOPRAM OXALATE 20 MG/1
TABLET ORAL
Qty: 90 TABLET | Refills: 0 | Status: SHIPPED | OUTPATIENT
Start: 2021-05-28 | End: 2021-08-30

## 2021-06-02 ENCOUNTER — ALLIED HEALTH/NURSE VISIT (OUTPATIENT)
Dept: OTHER | Facility: CLINIC | Age: 76
End: 2021-06-02
Payer: MEDICARE

## 2021-06-02 DIAGNOSIS — M54.50 LOWER BACK PAIN: Primary | ICD-10-CM

## 2021-06-02 PROCEDURE — 99207 PR COMMUNITY PARAMEDIC - PATIENT NOT BILLABLE: CPT

## 2021-06-02 ASSESSMENT — ACTIVITIES OF DAILY LIVING (ADL): DEPENDENT_IADLS:: SHOPPING

## 2021-06-02 NOTE — PROGRESS NOTES
Community Paramedics Follow-up Visit  June 2, 2021  TIME: Beth Ribeiro is a 76 year old female being seen at home for a follow-up visit.    Present at appointment: Patient    Primary Diagnosis: Frailty/Failure to thrive    Chief Complaint   Patient presents with     Outreach     Community Paramedic visit       [place] Utilization:   Clinic Utilization  Difficulty keeping appointments:: No  Compliance Concerns: No  No-Show Concerns: No  No PCP office visit in Past Year: No  Utilization    Last refreshed: 6/2/2021  9:09 AM: Hospital Admissions 0           Last refreshed: 6/2/2021  9:09 AM: ED Visits 0           Last refreshed: 6/2/2021  9:09 AM: No Show Count (past year) 2              Current as of: 6/2/2021  9:09 AM              There were no vitals taken for this visit.    Clinical Concerns:  Current Medical Concerns:  Lump in neck, recent falls    Current Behavioral Concerns: none    Education Provided to patient: no   Medication set up? No  Pill Box issued: No  Scale issued: No  Flu Shot given: No  Lab draw or specimen collection: No  Food box issued: No  Collaborative visit with PCP: No    Health Maintenance Reviewed: Due/Overdue     Clinical Pathway: None    No  Face to Face in Home / Community    Recent blood sugars: 134    Review of Symptoms/PE    Skin: negative  Eyes: negative  Ears/Nose/Throat: negative, hoarseness, lump on R side of trachea  Respiratory: Dyspnea on exertion-   Cardiovascular: negative  Gastrointestinal: negative  Genitourinary: negative  Musculoskeletal: back pain and neck pain  Neurologic: numbness or tingling of hands, numbness or tingling of feet and memory problems  Psychiatric: negative    Pain Management::  Pain (GOAL):: Yes  Type: Acute (<3mo)  Location of chronic pain:: coccyx-? fracture due to fall per chiropractor  Radiating: No  Progression: Waxing and Waning  Description of pain: Aching, Nagging  Chronic pain severity:: 5  Limitation of routine activities due to  chronic pain:: Yes(limits walking due to pain)  Description: Able to do most things most days with some rest  Alleviating Factors: Rest, Ice, Heat, Other(got thick mattress pad for her bed; sits on pillows)  Aggravating Factors: Activity    Medication Reconciliation  Medication adherence problem (GOAL):: No  Knowledgeable about how to use meds:: Yes  Medication side effects suspected:: No    Diet/Exercise/Sleep  Diet:: Diabetic diet  Inadequate nutrition (GOAL):: No  Tube Feeding: No  Inadequate activity/exercise (GOAL):: Yes  Significant changes in sleep pattern (GOAL): No    Plan:     Time spent with patient: 60    The patient meets one or more of the following criteria:  * Has been identified by their primary care provider at risk of nursing home placement    Acute concern/Follow-up recommendations: Continue tx plan.    Next CP visit scheduled: 6/16/21    Issues for Provider to follow up on: The pt reported new neck pain for 3 days.  She was able to see her chiropractor which helped relieve her pain.  She is concerned about the lump in her neck and has an appointment with her PCP on 6/10 to discuss this and also to find a new PCP.    Provider follow up visit needed: Multiple upcoming.

## 2021-06-02 NOTE — PROGRESS NOTES
[facility] Clinic - CardioMEMS Reading Review    June 2, 2021   CardioMEMS reviewed.  Current diuretic: Bumex 4 mg daily  Recent plan of care changes: none. PAD in goal range.     Current Threshold parameters:       Today's Waveform:       Readings:           RHC Date Wedge Pressure MEMS PAD during cath  (average of the 3 values)     7/1/2019 w/MEMS implant     20 mmHg   18 mmHg           Rosina Mays RN

## 2021-06-03 VITALS — OXYGEN SATURATION: 94 % | RESPIRATION RATE: 16 BRPM

## 2021-06-08 ENCOUNTER — OFFICE VISIT (OUTPATIENT)
Dept: OPHTHALMOLOGY | Facility: CLINIC | Age: 76
End: 2021-06-08
Attending: FAMILY MEDICINE
Payer: MEDICARE

## 2021-06-08 ENCOUNTER — PRE VISIT (OUTPATIENT)
Dept: OPHTHALMOLOGY | Facility: CLINIC | Age: 76
End: 2021-06-08

## 2021-06-08 DIAGNOSIS — H53.122 TRANSIENT VISUAL LOSS OF LEFT EYE: ICD-10-CM

## 2021-06-08 DIAGNOSIS — H57.12 PAIN AROUND LEFT EYE: ICD-10-CM

## 2021-06-08 DIAGNOSIS — N18.30 TYPE 2 DIABETES MELLITUS WITH STAGE 3 CHRONIC KIDNEY DISEASE, WITH LONG-TERM CURRENT USE OF INSULIN, UNSPECIFIED WHETHER STAGE 3A OR 3B CKD (H): Primary | Chronic | ICD-10-CM

## 2021-06-08 DIAGNOSIS — H35.89 RPE MOTTLING OF MACULA: ICD-10-CM

## 2021-06-08 DIAGNOSIS — Z79.4 TYPE 2 DIABETES MELLITUS WITH STAGE 3 CHRONIC KIDNEY DISEASE, WITH LONG-TERM CURRENT USE OF INSULIN, UNSPECIFIED WHETHER STAGE 3A OR 3B CKD (H): Primary | Chronic | ICD-10-CM

## 2021-06-08 DIAGNOSIS — E11.3393 MODERATE NONPROLIFERATIVE DIABETIC RETINOPATHY OF BOTH EYES WITHOUT MACULAR EDEMA ASSOCIATED WITH TYPE 2 DIABETES MELLITUS (H): ICD-10-CM

## 2021-06-08 DIAGNOSIS — E11.22 TYPE 2 DIABETES MELLITUS WITH STAGE 3 CHRONIC KIDNEY DISEASE, WITH LONG-TERM CURRENT USE OF INSULIN, UNSPECIFIED WHETHER STAGE 3A OR 3B CKD (H): Primary | Chronic | ICD-10-CM

## 2021-06-08 LAB
CRP SERPL-MCNC: 8.5 MG/L (ref 0–8)
ERYTHROCYTE [SEDIMENTATION RATE] IN BLOOD BY WESTERGREN METHOD: 18 MM/H (ref 0–30)

## 2021-06-08 PROCEDURE — 92134 CPTRZ OPH DX IMG PST SGM RTA: CPT | Performed by: OPHTHALMOLOGY

## 2021-06-08 PROCEDURE — 36415 COLL VENOUS BLD VENIPUNCTURE: CPT | Performed by: PATHOLOGY

## 2021-06-08 PROCEDURE — 86140 C-REACTIVE PROTEIN: CPT | Performed by: PATHOLOGY

## 2021-06-08 PROCEDURE — 99204 OFFICE O/P NEW MOD 45 MIN: CPT | Performed by: OPHTHALMOLOGY

## 2021-06-08 PROCEDURE — 85652 RBC SED RATE AUTOMATED: CPT | Performed by: PATHOLOGY

## 2021-06-08 ASSESSMENT — REFRACTION_WEARINGRX
OS_ADD: +2.50
OS_CYLINDER: +2.25
OS_AXIS: 097
OS_SPHERE: -3.75
OD_ADD: +2.50
OD_CYLINDER: +1.50
OD_SPHERE: -2.75
OD_AXIS: 057

## 2021-06-08 ASSESSMENT — VISUAL ACUITY
CORRECTION_TYPE: GLASSES
OD_CC: 20/50
OD_CC+: -2
METHOD: SNELLEN - LINEAR
OS_CC+: -1
OS_CC: 20/40
OS_PH_CC: 20/30
OS_PH_CC+: -1

## 2021-06-08 ASSESSMENT — REFRACTION_MANIFEST
OS_ADD: +2.50
OS_CYLINDER: +1.25
OD_SPHERE: -3.00
OD_AXIS: 085
OS_AXIS: 124
OD_ADD: +2.50
OS_SPHERE: -3.25
OD_CYLINDER: +1.75

## 2021-06-08 ASSESSMENT — EXTERNAL EXAM - LEFT EYE: OS_EXAM: NORMAL

## 2021-06-08 ASSESSMENT — TONOMETRY
OD_IOP_MMHG: 12
IOP_METHOD: TONOPEN
OS_IOP_MMHG: 14

## 2021-06-08 ASSESSMENT — CUP TO DISC RATIO
OS_RATIO: 0.45
OD_RATIO: 0.3

## 2021-06-08 ASSESSMENT — CONF VISUAL FIELD
METHOD: COUNTING FINGERS
OS_NORMAL: 1
OD_NORMAL: 1

## 2021-06-08 ASSESSMENT — EXTERNAL EXAM - RIGHT EYE: OD_EXAM: NORMAL
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
## 2021-06-08 NOTE — TELEPHONE ENCOUNTER
"RN Triage  Mariel is calling today because on Saturday she first noticed her oximeter was reading lower than usual at 84. It went back up one day and then back down again. Today it is reading 93-95%, she does not wear oxygen but uses a cpap at night. Blood pressure was down those days too 84/40 yesterday. Today her blood pressure is 112/59.  Blood sugars have been high. Today she reports feeling \"crappy.\" She reports sore throat, head aches, cough. She states she does not run fevers but her temperature has been running higher than what is normal for her.     Mariel does not want to call an ambulance and would prefer not to go to ER. I suggested urgency room which she agrees to. Her roommate will drive her.     Marry Murphy RN  [facility] Nurse Advisor    Reason for Disposition    MODERATE difficulty breathing (e.g., speaks in phrases, SOB even at rest, pulse 100-120)    Difficult to awaken or acting confused (e.g., disoriented, slurred speech)     Does not like ambulances, refuses.    HIGH RISK patient (e.g., age > 64 years, diabetes, heart or lung disease, weak immune system)    Protocols used: CORONAVIRUS (COVID-19) DIAGNOSED OR [id number] 4.22.20    COVID 19 Nurse Triage Plan/Patient Instructions    Please be aware that novel coronavirus (COVID-19) may be circulating in the community. If you develop symptoms such as fever, cough, or SOB or if you have concerns about the presence of another infection including coronavirus (COVID-19), please contact your health care provider or visit [email or web address].     Disposition/Instructions    Patient to go to ED and follow protocol based instructions. Follow System Ambulatory Workflow for COVID 19.     Bring Your Own Device:  Please also bring your smart device(s) (smart phones, tablets, laptops) and their charging cables for your personal use and to communicate with your care team during your visit.      Thank you for limiting contact with others, wearing a simple " mask to cover your cough, practice good hand hygiene habits and accessing our virtual services where possible to limit the spread of this virus.    For more information about COVID19 and options for caring for yourself at home, please visit the CDC website at https://www.cdc.gov/coronavirus/2019-ncov/about/steps-when-sick.html  For more options for care at [facility], please visit our website at [email or web address]    For more information, please use the [place] Department of Health COVID-19 Website: [email or web address]  [place] Department of Health ([facility]) COVID-19 Hotlines (Interpreters available):      Health questions: Phone Number: [phone] or [phone] and Hours: 7 a.m. to 7 p.m.    Schools and  questions: Phone Number: [phone] or [phone] and Hours 7 a.m. to 7 p.m.

## 2021-06-08 NOTE — NURSING NOTE
Chief Complaints and History of Present Illnesses   Patient presents with     Diabetic Eye Exam     Chief Complaint(s) and History of Present Illness(es)     Diabetic Eye Exam     Vision: is blurred for near and is blurred for distance    Associated symptoms: flashes (She sees little stars when her eyes are tired.) and photophobia.  Negative for floaters and redness    Diabetes Type: Type 2    Treatments tried: no treatments    Pain scale: 7/10 (Yazidism area)              Comments     She state that she gets intermittent sharp eye pains.  Currently she is having some pain in her left eye temple area.  She has episodes where the vision in her left eye completely blacks out. The vision has blacked out for 2 minutes (longest episode).  This occurs at least a couple times a month and is happening more frequently in the past week.   Her temple area has gotten so tender that she stopped wearing her glasses.    She got new glasses about 6 months ago.  She did not see well with them, had them redone and is not seeing well with the second pair either.    She stopped driving a few months back because she is unable to see to read signs in the distance.    Lab Results       Component                Value               Date                       A1C                      6.8                 04/29/2021                 A1C                      6.9                 06/24/2020                 A1C                      6.5                 04/30/2019                 A1C                      5.2                 09/10/2018                 A1C                      9.3                 07/01/2018              VINITA Gray 2:49 PM  June 8, 2021

## 2021-06-09 ENCOUNTER — TELEPHONE (OUTPATIENT)
Dept: OPHTHALMOLOGY | Facility: CLINIC | Age: 76
End: 2021-06-09

## 2021-06-09 PROBLEM — C50.312 MALIGNANT NEOPLASM OF LOWER-INNER QUADRANT OF LEFT BREAST IN FEMALE, ESTROGEN RECEPTOR POSITIVE (H): Status: ACTIVE | Noted: 2019-11-21

## 2021-06-09 PROBLEM — Z17.0 MALIGNANT NEOPLASM OF LOWER-INNER QUADRANT OF LEFT BREAST IN FEMALE, ESTROGEN RECEPTOR POSITIVE (H): Status: ACTIVE | Noted: 2019-11-21

## 2021-06-09 NOTE — PROGRESS NOTES
[facility] - CardioMEMS Reading Review    June 9, 2021   CardioMEMS reviewed.  Current diuretic: Bumex 4 mg daily  Recent plan of care changes: none. PAD in goal range.     Current Threshold parameters:       Today's Waveform:       Readings:           RHC Date Wedge Pressure MEMS PAD during cath  (average of the 3 values)     7/1/2019 w/MEMS implant     20 mmHg   18 mmHg           Rosina Mays RN

## 2021-06-09 NOTE — TELEPHONE ENCOUNTER
Spoke to Dr. Acosta about patient's labs.  CRP mildly elevated and ESR normal.  Dr. Acosta would like to get Dr. Ramos's input as to cause.  She will hold off on starting patient on steroids until then.  Spoke to patient and assisted with scheduling with Dr. Ramos tomorrow.      Carrie Brown on 6/9/2021 at 11:40 AM

## 2021-06-10 ENCOUNTER — OFFICE VISIT (OUTPATIENT)
Dept: OPHTHALMOLOGY | Facility: CLINIC | Age: 76
End: 2021-06-10
Attending: OPHTHALMOLOGY
Payer: MEDICARE

## 2021-06-10 ENCOUNTER — OFFICE VISIT (OUTPATIENT)
Dept: FAMILY MEDICINE | Facility: CLINIC | Age: 76
End: 2021-06-10
Payer: MEDICARE

## 2021-06-10 VITALS
SYSTOLIC BLOOD PRESSURE: 116 MMHG | OXYGEN SATURATION: 94 % | HEART RATE: 61 BPM | TEMPERATURE: 96.7 F | RESPIRATION RATE: 16 BRPM | DIASTOLIC BLOOD PRESSURE: 70 MMHG

## 2021-06-10 DIAGNOSIS — R51.9 TEMPORAL PAIN: ICD-10-CM

## 2021-06-10 DIAGNOSIS — R79.82 ELEVATED C-REACTIVE PROTEIN: Primary | ICD-10-CM

## 2021-06-10 DIAGNOSIS — E11.65 TYPE 2 DIABETES MELLITUS WITH HYPERGLYCEMIA, WITH LONG-TERM CURRENT USE OF INSULIN (H): ICD-10-CM

## 2021-06-10 DIAGNOSIS — R41.3 MEMORY LOSS: ICD-10-CM

## 2021-06-10 DIAGNOSIS — Z11.59 ENCOUNTER FOR SCREENING FOR OTHER VIRAL DISEASES: ICD-10-CM

## 2021-06-10 DIAGNOSIS — H04.123 DRY EYE SYNDROME OF BOTH EYES: ICD-10-CM

## 2021-06-10 DIAGNOSIS — H53.2 DIPLOPIA: ICD-10-CM

## 2021-06-10 DIAGNOSIS — H53.10 SUBJECTIVE VISUAL DISTURBANCE: Primary | ICD-10-CM

## 2021-06-10 DIAGNOSIS — G89.29 OTHER CHRONIC PAIN: ICD-10-CM

## 2021-06-10 DIAGNOSIS — Z79.4 TYPE 2 DIABETES MELLITUS WITH HYPERGLYCEMIA, WITH LONG-TERM CURRENT USE OF INSULIN (H): ICD-10-CM

## 2021-06-10 DIAGNOSIS — Z20.822 EXPOSURE TO COVID-19 VIRUS: Primary | ICD-10-CM

## 2021-06-10 PROCEDURE — G0463 HOSPITAL OUTPT CLINIC VISIT: HCPCS

## 2021-06-10 PROCEDURE — 99214 OFFICE O/P EST MOD 30 MIN: CPT | Performed by: FAMILY MEDICINE

## 2021-06-10 PROCEDURE — 92014 COMPRE OPH EXAM EST PT 1/>: CPT | Mod: GC | Performed by: OPHTHALMOLOGY

## 2021-06-10 PROCEDURE — U0005 INFEC AGEN DETEC AMPLI PROBE: HCPCS | Performed by: OPHTHALMOLOGY

## 2021-06-10 PROCEDURE — U0003 INFECTIOUS AGENT DETECTION BY NUCLEIC ACID (DNA OR RNA); SEVERE ACUTE RESPIRATORY SYNDROME CORONAVIRUS 2 (SARS-COV-2) (CORONAVIRUS DISEASE [COVID-19]), AMPLIFIED PROBE TECHNIQUE, MAKING USE OF HIGH THROUGHPUT TECHNOLOGIES AS DESCRIBED BY CMS-2020-01-R: HCPCS | Performed by: OPHTHALMOLOGY

## 2021-06-10 RX ORDER — TRAMADOL HYDROCHLORIDE 50 MG/1
TABLET ORAL
Qty: 60 TABLET | Refills: 0 | Status: SHIPPED | OUTPATIENT
Start: 2021-06-10 | End: 2021-08-24

## 2021-06-10 ASSESSMENT — REFRACTION_WEARINGRX
OD_SPHERE: -2.75
OD_CYLINDER: +1.50
OS_SPHERE: -3.75
OD_AXIS: 057
OS_CYLINDER: +2.25
OS_AXIS: 097
OS_ADD: +2.50
OD_ADD: +2.50

## 2021-06-10 ASSESSMENT — VISUAL ACUITY
OS_CC: 20/30
OD_CC: 20/30
CORRECTION_TYPE: GLASSES
METHOD: SNELLEN - LINEAR

## 2021-06-10 ASSESSMENT — CONF VISUAL FIELD
OS_NORMAL: 1
METHOD: COUNTING FINGERS
OD_NORMAL: 1

## 2021-06-10 ASSESSMENT — TONOMETRY
OD_IOP_MMHG: 11
IOP_METHOD: ICARE
OS_IOP_MMHG: 10

## 2021-06-10 ASSESSMENT — CUP TO DISC RATIO
OS_RATIO: 0.45
OD_RATIO: 0.3

## 2021-06-10 ASSESSMENT — EXTERNAL EXAM - RIGHT EYE: OD_EXAM: NORMAL

## 2021-06-10 ASSESSMENT — EXTERNAL EXAM - LEFT EYE: OS_EXAM: NORMAL

## 2021-06-10 NOTE — PROGRESS NOTES
Assessment & Plan     Type 2 diabetes mellitus with hyperglycemia, with long-term current use of insulin (H)    - insulin glargine (LANTUS SOLOSTAR) 100 UNIT/ML pen; Inject  31 units  daily subcutaneously.  Call  if blood sugar <70, often >200.    Tightly controlled.  Last A1c 6.8% 4-.  Repeat labs end of July.    Other chronic pain    - traMADol (ULTRAM) 50 MG tablet; TAKE 1 TABLET(50 MG) BY MOUTH EVERY 6 HOURS AS NEEDED FOR BREAKTHROUGH PAIN OR SEVERE PAIN    Stable with prn use of Tramadol.  Refilled today    Encounter for screening for other viral diseases  Exposure to COVID-19 virus    - SARS-CoV-2 COVID-19 Virus (Coronavirus) by PCR    COVID swab today prior to biopsy next week.    Memory loss    Discussed NEUROPSYCH testing if desires- at this time defers.    30 minutes spent on the date of the encounter doing chart review, patient visit and documentation     Amee Connell MD  [facility]    Breana Floyd is a 76 year old who presents for the following health issues     HPI     Paitient is here for a lab follow up. Patient will need a renewal on Lantus Solostar and Tramadol.    Former PCP Nataliia. Complicated past medical history.  Lives with roommate who is very involved in Mariel's care.  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    Concern for memory changes-- sometimes is alert and at baseline- other days is fatigued and more cloudy.    Has biopsy next Monday with OPHTHALMOLOGY next Monday for possible temporal arteritis- needs COVID test today.      Review of Systems   Constitutional, HEENT, cardiovascular, pulmonary, GI, , musculoskeletal, neuro, skin, endocrine and psych systems are negative, except as otherwise noted.      Objective    /70 (BP Location: Right arm, Patient Position: Sitting, Cuff Size: Adult Large)   Pulse 61   Temp 96.7  F (35.9  C) (Temporal)   Resp 16   SpO2 94%   There is no height or weight on file to calculate BMI.  Physical Exam    GENERAL: healthy, alert and no distress  EYES: Eyes grossly normal to inspection, PERRL and EOMI  NECK: no adenopathy, no asymmetry, masses, or scars and thyroid normal to palpation  MS: no gross musculoskeletal defects noted, no edema  SKIN: no suspicious lesions or rashes  NEURO: Normal strength and tone, mentation intact and speech normal  PSYCH: mentation appears normal, affect normal/bright

## 2021-06-10 NOTE — PROGRESS NOTES
"     Assessment & Plan     Mariel Ribeiro is a 76 year old female with the following diagnoses:   1. Elevated C-reactive protein    2. Temporal pain    3. Dry eye syndrome of both eyes    4. Diplopia              HPI:  Patient was sent for consultation by Dr. Gwendolyn Acosta for vision loss and temple pain with concern for giant cell arteritis.  She has been having difficulty \"focusing\".  She feels like her eyes drift.  She has gotten 2 different glasses prescriptions and continued to have focusing difficulty.  She feels this problem is progressive.  Although she feels the sensation of the eyes drifting, she doesn't experience overt diplopia, just the difficulty focusing.  She is having left temple pain, which is more recent, certainly within the last month.  She has also been having headaches once in awhile and she thinks she is more sensitive to light over 3-4 months.  She chops her food up small because she has dentures and difficulty swallowing but denies jaw claudication.  She has new neck stiffness over the same timeframe as above.  No anorexia or unintentional.  No fevers.          Independent historians:  Patient and friend    Review of outside testing:  ESR 18  CRP 8.5     OCT rNFL right eye poor scan; left eye WNL    My interpretation performed today of outside testing:  CRP elevated      Review of outside clinical notes:  Dr. Acosta 6/8/2021    Past medical history:  T2DM2  COPD  BELEN  Fibromyalgia  CAD CABG x3  Hypothyroidism  Fatty liver    Family history / social history:  Negative family history of eye problems  Retired   Former smoker  ETOH no  Drugs no    Exam:  VA 20/30 ou, IOP 11/10, Pupils reactive no APD, EOM full, color 11/11 right eye and 10/11 left eye, anterior segment PCIOL, dilated fundoscopic examination no disc edema or pallor, mild NPDR left eye, no macular edema.  CN 2, V1-3, 7-9 intact      Tests ordered and interpreted today:  None    Discussion of management / " interpretation with another provider: Oculoplastics and retina providers      Assessment/Plan:   It is my impression has a new left temple headache, decreased color vision 10/11 left eye, and CRP elevated to 8.5 in a 76F which raises suspicion for GCA.  There is no jaw claudication (although patient is a poor historian).  She endorses moderately severe temporal tenderness on the left side.  Suspicion for GCA is low but symptoms plus CRP would recommend temporal artery biopsy. Will hold off on prednisone as she is a diabetic and the suspicion is low.  Certainly her monocular double vision is not consistent with giant cell arteritis.  Since the double vision is intermittent and she has findings of dry eye, this is the most likely cause.  The punctate changes on the cornea are symmetric however she states that she has a lazy left eye which is why she does not have the double vision in the left eye very often.  I asked the patient to use artificial tears as well as warm compresses to the eyelid margin  on a regular basis.  I asked the patient to take fish oil capsules 2x/day for a month and then 1x/d thereafter.   If that is not beneficial we could consider punctal plugs, corticosteroid eye drops and Restasis.     Patient has new macular hemorrhage.  On OCT this is located in the outer nuclear layer.  This is consistent with her diabetes.  Would recommend observation for the time being and follow-up in 1 year sooner as needed for worsening symptoms for this.       Attending Physician Attestation:  Complete documentation of historical and exam elements from today's encounter can be found in the full encounter summary report (not reduplicated in this progress note).  I personally obtained the chief complaint(s) and history of present illness.  I confirmed and edited as necessary the review of systems, past medical/surgical history, family history, social history, and examination findings as documented by others; and I  examined the patient myself.  I personally reviewed the relevant tests, images, and reports as documented above.  I formulated and edited as necessary the assessment and plan and discussed the findings and management plan with the patient and family. - Jefry Hobbs, PGY3  Ophthalmology Resident

## 2021-06-10 NOTE — PATIENT INSTRUCTIONS
You have been diagnosed with Dry eye syndrome.  Symptoms of Dry eye syndrome can include double vision, eye pain, blurry vision, stinging, burning, tearing, eye redness.      Some useful instructions are listed below:     1.  Use artificial tears on a regular basis.  If you use artificial tear drops with preservative, you can use them up to 4-6 times per day. If you use artificial tear drops without preservatives, then you can use them more often.      2.  Wash your eyelashes with hot water.  Do not make the water so hot that you burn yourself, but the water should be more than just warm.  Often patients will wash their eyelashes in the shower under the hot water.  You should rub gently along the base of your eyelashes.      3.  Taking fish oil capsules twice a day for a month and then once a day after that can help improve Dry eye symptoms.      4.  Drink a lot of water.  Hydration can be helpful.      5.  Humidify your environment.      6.  If this is not beneficial, other treatments can include punctal plugs or cautery, corticosteroid eye drops, Restasis, Lipiflow, or autologous serum.  If you are still struggling with dry eye symptoms, call Dr. Ramos's office for other therapies or a referral to a dry eye specialist.

## 2021-06-11 LAB
LABORATORY COMMENT REPORT: NORMAL
SARS-COV-2 RNA RESP QL NAA+PROBE: NEGATIVE
SARS-COV-2 RNA RESP QL NAA+PROBE: NORMAL
SPECIMEN SOURCE: NORMAL
SPECIMEN SOURCE: NORMAL

## 2021-06-14 ENCOUNTER — HOSPITAL ENCOUNTER (OUTPATIENT)
Facility: AMBULATORY SURGERY CENTER | Age: 76
Discharge: HOME | End: 2021-06-14
Attending: OPHTHALMOLOGY | Admitting: OPHTHALMOLOGY
Payer: MEDICARE

## 2021-06-14 VITALS
HEIGHT: 66 IN | OXYGEN SATURATION: 96 % | HEART RATE: 59 BPM | SYSTOLIC BLOOD PRESSURE: 166 MMHG | TEMPERATURE: 97 F | RESPIRATION RATE: 20 BRPM | BODY MASS INDEX: 47.09 KG/M2 | WEIGHT: 293 LBS | DIASTOLIC BLOOD PRESSURE: 67 MMHG

## 2021-06-14 DIAGNOSIS — R79.82 ELEVATED C-REACTIVE PROTEIN: ICD-10-CM

## 2021-06-14 DIAGNOSIS — R51.9 TEMPORAL PAIN: ICD-10-CM

## 2021-06-14 PROCEDURE — 88313 SPECIAL STAINS GROUP 2: CPT | Performed by: OPHTHALMOLOGY

## 2021-06-14 PROCEDURE — 88305 TISSUE EXAM BY PATHOLOGIST: CPT | Performed by: OPHTHALMOLOGY

## 2021-06-14 PROCEDURE — 37609 LIGATION/BX TEMPORAL ARTERY: CPT | Mod: LT

## 2021-06-14 PROCEDURE — G8918 PT W/O PREOP ORDER IV AB PRO: HCPCS

## 2021-06-14 PROCEDURE — G8907 PT DOC NO EVENTS ON DISCHARG: HCPCS

## 2021-06-14 RX ORDER — ERYTHROMYCIN 5 MG/G
OINTMENT OPHTHALMIC PRN
Status: DISCONTINUED | OUTPATIENT
Start: 2021-06-14 | End: 2021-06-14 | Stop reason: HOSPADM

## 2021-06-14 RX ORDER — BACITRACIN ZINC 500 [USP'U]/G
OINTMENT TOPICAL
Qty: 28 G | Refills: 0 | Status: SHIPPED | OUTPATIENT
Start: 2021-06-14 | End: 2022-05-31

## 2021-06-14 ASSESSMENT — MIFFLIN-ST. JEOR: SCORE: 1931.51

## 2021-06-14 NOTE — OP NOTE
PREOPERATIVE DIAGNOSIS:  Left temporal headache, elevated inflammatory markers, rule out temporal arteritis.      POSTOPERATIVE DIAGNOSIS: Left temporal headache, elevated inflammatory markers, rule out temporal arteritis.      PROCEDURE:  Left temporal artery biopsy.      SURGEON:  Kira Teran MD     ASSISTANTS:  Frank Horn MD     ANESTHESIA: Local infiltration of a 50/50 mixture of 2% lidocaine with epinephrine and 0.5% Marcaine.      COMPLICATIONS:  None.      ESTIMATED BLOOD LOSS:  Less than 5 mL.     SPECIMEN: Temporal artery in formalin.     HISTORY OF PRESENT ILLNESS:  Mariel Ribeiro  presented with left temporal headache and vision changes. Her ophthalmologist was concerned for giant cell arteritis and checked inflammatory markers. Her CRP was elevated at 8.5 mg/L. She was evaluated by neuro-ophthalmology who did not start prednisone due to concern about her diabetic control, but did refer her for temporal artery biopsy urgently. After the risks, benefits and alternatives were explained, informed consent was obtained.      DESCRIPTION OF PROCEDURE:  The patient was brought to the operating room and placed supine on the operating table.  IV sedation was given.  The temporal artery was palpated on the right side, a marking made extending from the ear superotemporally over the artery. The area was infiltrated with local anesthetic and the area was prepped and draped in the typical sterile fashion for oculoplastic surgery.  Attention was directed to the right side.  An incision was made with a 15 blade through the skin.  Subcutaneous tissue was dissected with the Hassan scissors. The artery was identified.  I dissected out the artery and  tagged the proximal and distal ends with 4-0 silk suture. The artery was cut with the Leonora scissors.  Hemostasis was obtained with monopolar cautery.  The incision was closed with interrupted buried 4-0 Monocryl sutures deep and 5-0 chromic sutures on the skin  edges. Antibiotic ointment was applied.    The specimen was placed in formalin and sent for pathologic evaluation.  A 2.8-cm in situ biopsy was obtained.  The patient tolerated the procedure well and was taken to recovery room in stable condition.       Kira Teran MD

## 2021-06-14 NOTE — BRIEF OP NOTE
[facility]    Brief Operative Note    Pre-operative diagnosis: Temporal pain [R51.9]  Elevated C-reactive protein [R79.82]  Post-operative diagnosis Same as pre-operative diagnosis    Procedure: Procedure(s):  left temporal artery biopsy  Surgeon: Surgeon(s) and Role:     * Kira Teran MD - Primary  Anesthesia: Local   Estimated blood loss: Less than 10 ml  Drains: None  Specimens:   ID Type Source Tests Collected by Time Destination   A : Left temporal artery biopsy Tissue Artery, Temporal SURGICAL PATHOLOGY EXAM Kira Teran MD 6/14/2021  1:38 PM      Findings:   None.  Complications: None.  Implants: * No implants in log *      Frank Horn MD  Department of Ophthalmology  Pager: [phone]

## 2021-06-14 NOTE — DISCHARGE INSTRUCTIONS
[facility]   Adult Discharge Orders & Instructions     For 24 hours after surgery    1. Get plenty of rest.  A responsible adult must stay with you for at least 24 hours after you leave the hospital.   2. Do not drive or use heavy equipment.  If you have weakness or tingling, don't drive or use heavy equipment until this feeling goes away.  3. Do not drink alcohol.  4. Avoid strenuous or risky activities.  Ask for help when climbing stairs.   5. You may feel lightheaded.  IF so, sit for a few minutes before standing.  Have someone help you get up.   6. If you have nausea (feel sick to your stomach): Drink only clear liquids such as apple juice, ginger ale, broth or 7-Up.  Rest may also help.  Be sure to drink enough fluids.  Move to a regular diet as you feel able.  7. You may have a slight fever. Call the doctor if your fever is over 100 F (37.7 C) (taken under the tongue) or lasts longer than 24 hours.  8. You may have a dry mouth, a sore throat, muscle aches or trouble sleeping.  These should go away after 24 hours.  9. Do not make important or legal decisions.     Call your doctor for any of the followin.  Signs of infection (fever, growing tenderness at the surgery site, a large amount of drainage or bleeding, severe pain, foul-smelling drainage, redness, swelling).    2. It has been over 8 to 10 hours since surgery and you are still not able to urinate (pass water).    3.  Headache for over 24 hours.    4.  Numbness, tingling or weakness the day after surgery (if you had spinal anesthesia).                  5. Signs of Covid-19 infection (temperature over 100 degrees, shortness of breath, cough, loss of taste/smell, generalized body aches, persistent headache,                  chills, sore throat, nausea/vomiting/diarrhea).    ________________________________________  Post-operative Instructions  Ophthalmic Plastic and Reconstructive Surgery    Kira Teran M.D.     All  instructions apply to the operated eye(s) or eyelid(s).    Wound care and personal care  ? If a patch or bandage has been placed, please leave this in place until seen by your physician. Ensure that the bandage does not get wet when you take a shower.  ? Apply ice compresses 15 minutes of every hour while awake for 2 days. As long as there is no further bleeding, after two days, switch to warm water compresses for five minutes, four times a day until seen by your physician.   ? You may shower or wash your hair the day after surgery. Do not go swimming for at least 2 weeks to prevent contamination of your wounds.  ? Expect some swelling, bruising, around the scalp area. Also expect serum caking, crusting and discharge from the incisions. A small amount of surface bleeding, and depending on the type of surgery, bleeding from the inside of the eyelid, is normal for the first 48 hours.  ? Avoid straining, bending at the waist, or lifting more than 15 pounds for 10 days. Activities that raise your blood pressure can worsen swelling, cause bleeding, and breaking of sutures. Like wise, sleeping with your head slightly elevated for the first several days can help swelling resolve more quickly.   ? Do continue to ambulate (walk) as you normally would - being sedentary after surgery can cause blood clots.   ? Your eye(s) and eyelid(s) may be painful and tender. This is normal after surgery.    Contact information and follow-up  ? Return to the Eye Clinic for a follow-up appointment with your physician as scheduled. If no appointment has been scheduled:   - [facility] eye clinic: [phone] for an appointment with Dr. Teran within 1 to 2 weeks from your date of surgery. If you are scheduled for a phone or video visit for your first postoperative appointment, please e-mail pictures to [email or web address] 1-2 days before your appointment.   -  [facility] eye [facility]: [phone] for an  appointment with Dr. Teran within 1 to 2 weeks from your date of surgery.     ? For severe pain, bleeding, or loss of vision, call the [facility] at [phone] or [facility] at [phone].     After hours or on weekends and holidays, call [phone] and ask to speak with the ophthalmologist on call.    An on call person can be reached after hours for concerns. The on call doctor should not call in medication refill requests after hours or on weekends, so please plan accordingly. An effort has been made to provide adequate pain medications following every surgery, and refills will not be provided in most instances. Narcotic pain medications cannot be called in.     Activity restrictions and driving  ? Avoid heavy lifting, bending, exercise or strenuous activity for 1 week after surgery. You may resume other activities and return to work as tolerated.    Medications  ? Restart all regular home medications and eye drops. If you take Plavix or Aspirin on a regular basis, wait for 72 hours after your surgery before restarting these in order to decrease the risk of bleeding complications.  ? Avoid aspirin and aspirin-like medications (Motrin, Aleve, Ibuprofen, [place] etc) for 72 hours to reduce the risk of bleeding. You may take Tylenol (acetaminophen) for pain.  ? In addition to your home medications, take the following post-operative medications as prescribed by your physician.    ? Apply antibiotic ointment to all sutures three times a day. Use until follow up.

## 2021-06-14 NOTE — PROGRESS NOTES
[facility] - CardioMEMS Reading Review    June 14, 2021   CardioMEMS reviewed.  Current diuretic: Bumex 4 mg daily  Recent plan of care changes: No changes. PAD in goal range. Will continue to monitor.     Current Threshold parameters:       Today's Waveform:       Readings:           RHC Date Wedge Pressure MEMS PAD during cath  (average of the 3 values)     7/1/2019 w/MEMS implant     20 mmHg   18 mmHg           Rosina Mays RN

## 2021-06-16 ENCOUNTER — ALLIED HEALTH/NURSE VISIT (OUTPATIENT)
Dept: OTHER | Facility: CLINIC | Age: 76
End: 2021-06-16
Payer: MEDICARE

## 2021-06-16 VITALS
OXYGEN SATURATION: 95 % | SYSTOLIC BLOOD PRESSURE: 112 MMHG | HEART RATE: 66 BPM | DIASTOLIC BLOOD PRESSURE: 68 MMHG | RESPIRATION RATE: 16 BRPM

## 2021-06-16 DIAGNOSIS — R41.0 CONFUSION: Primary | ICD-10-CM

## 2021-06-16 PROCEDURE — 99207 PR COMMUNITY PARAMEDIC - PATIENT NOT BILLABLE: CPT

## 2021-06-16 ASSESSMENT — ACTIVITIES OF DAILY LIVING (ADL): DEPENDENT_IADLS:: SHOPPING

## 2021-06-16 NOTE — PROGRESS NOTES
Community Paramedics Follow-up Visit  June 16, 2021  TIME: 1130    Mariel Ribeiro is a 76 year old female being seen at home for a follow-up visit.    Present at appointment: patient, roommate           Chief Complaint   Patient presents with     Outreach     Community Paramedic visit       Universal Utilization:      Utilization    Last refreshed: 6/16/2021  6:41 AM: Hospital Admissions 0           Last refreshed: 6/16/2021  6:41 AM: ED Visits 0           Last refreshed: 6/16/2021  6:41 AM: No Show Count (past year) 2              Current as of: 6/16/2021  6:41 AM              There were no vitals taken for this visit.    Clinical Concerns:  Current Medical Concerns:  Temporal arthritis    Current Behavioral Concerns: No    Education Provided to patient: No   Medication set up? No  Pill Box issued: No  Scale issued: No  Flu Shot given: No  Lab draw or specimen collection: No  Food box issued: No  Collaborative visit with PCP: No    Health Maintenance Reviewed:      Clinical Pathway: None    No  Face to Face in Home / Community      Review of Symptoms/PE    Skin: negative  Eyes: eye pain, tearing, recent L temporal artery biopsy  Ears/Nose/Throat: mass on R side of trachea   Respiratory: Dyspnea on exertion-   Cardiovascular: negative  Gastrointestinal: negative  Genitourinary: negative  Musculoskeletal: back pain  Neurologic: numbness or tingling of hands, numbness or tingling of feet and memory problems  Psychiatric: negative    Pain Management::                 Plan:     Time spent with patient: 60    The patient meets one or more of the following criteria:  * Has been identified by their primary care provider at risk of nursing home placement    Acute concern/Follow-up recommendations: Continue tx plan.    Next CP visit scheduled: 7/9/21    Issues for Provider to follow  on: Mariel has a small mass on the upper part of her neck , just R of her trachea.  This has been there for a few weeks and forgot to tell   Becki at their last appointment.  She reports that she will sleep with her head to the L side so it does not interfere with her breathing.  It does not interfere with her breathing while awake and sitting up.  She feels that it is growing in size.       Provider follow up visit needed: Multiple upcoming.

## 2021-06-17 ENCOUNTER — TELEPHONE (OUTPATIENT)
Dept: OPHTHALMOLOGY | Facility: CLINIC | Age: 76
End: 2021-06-17

## 2021-06-17 LAB — COPATH REPORT: NORMAL

## 2021-06-17 NOTE — TELEPHONE ENCOUNTER
Telephone Encounter:    Called patient to discuss findings of the negative temporal artery biopsy that was performed. She understands these results and was advised to follow-up with her eye doctor to help follow-up for further management.     She does report that she is having some tenderness in her head area at the site of the incision. She is not having abnormal discharge, drainage, the area is not overtly red or abnormal. We discussed these findings and the expected post-operative course -- and she understands to let us know if symptoms significantly changes or worsens.     Frank Horn MD  Department of Ophthalmology  Pager: [phone]

## 2021-06-23 ENCOUNTER — PATIENT OUTREACH (OUTPATIENT)
Dept: NURSING | Facility: CLINIC | Age: 76
End: 2021-06-23
Payer: MEDICARE

## 2021-06-23 NOTE — PROGRESS NOTES
Clinic Care Coordination Contact  Community Health Worker Follow Up  Spoke patient patient.     Goals: CHW did not review goals.     Chart Review: Patient is working with [facility]. Next CP visit scheduled 7/9/21.     Intervention and Education during outreach: CHW introduced self and role with CC. CHW inquired if patient needs any additional support or resources and how it's going with community paramedic services. Patient declined needing any resources at this time. Patient states that working with community paramedic is going well. If she needs anything, she usually tells CP Rafaela and she relays it to the team. CHW encouraged also reaching out to CHW as needed for support if cannot reach CP. Patient understands and has no outstanding questions or concerns at this time. CHW reviewed that next CP visit is scheduled 7/9.    CHW Plan: CHW to follow up in 1 month.     Delilah Ramos  Clinic Care Coordination  [facility]'s, and [facility]    Phone: [phone]    _______________    Next Outreach:  7/23/21  Planned Outreach Frequency: Monthly Preferred Phone Number: [phone]    Enrollment Date:  5/5/20  Last Care Plan Assessment:  3/22/21

## 2021-06-24 NOTE — PROGRESS NOTES
[facility] - CardioMEMS Reading Review    June 24, 2021   CardioMEMS reviewed.  Current diuretic: Bumex 4 mg daily  Recent plan of care changes:     Current Threshold parameters:       Today's Waveform:       Readings:           RHC Date Wedge Pressure MEMS PAD during cath  (average of the 3 values)     7/1/2019 w/MEMS implant     20 mmHg   18 mmHg           Rosina Mays RN

## 2021-06-25 ENCOUNTER — PATIENT OUTREACH (OUTPATIENT)
Dept: CARE COORDINATION | Facility: CLINIC | Age: 76
End: 2021-06-25

## 2021-06-25 ENCOUNTER — ALLIED HEALTH/NURSE VISIT (OUTPATIENT)
Dept: CARDIOLOGY | Facility: CLINIC | Age: 76
End: 2021-06-25
Attending: NURSE PRACTITIONER
Payer: MEDICARE

## 2021-06-25 DIAGNOSIS — I50.32 CHRONIC DIASTOLIC HEART FAILURE (H): Primary | ICD-10-CM

## 2021-06-25 PROCEDURE — 93264 REM MNTR WRLS P-ART PRS SNR: CPT | Performed by: NURSE PRACTITIONER

## 2021-06-25 ASSESSMENT — ACTIVITIES OF DAILY LIVING (ADL): DEPENDENT_IADLS:: SHOPPING

## 2021-06-25 NOTE — PROGRESS NOTES
Clinic Care Coordination Contact    Situation: Patient chart reviewed by care coordinator.    Background: Patient due for RNCC 6 week chart review    Assessment:     Patient continues to utilize community paramedic    Patient had recent follow up with Cardiology    CHW reached out to patient recently and patient declines any needs or resources    Plan/Recommendations: CHW to continue monthly outreaches.    Peggy Raines RN Care Coordinator     [facility] Ambulatory Care Management  [facility] and   [email or web address]    Office: [phone]

## 2021-06-28 DIAGNOSIS — I50.32 CHRONIC DIASTOLIC HEART FAILURE (H): Chronic | ICD-10-CM

## 2021-06-28 NOTE — PROGRESS NOTES
"[facility] - CardioMEMS Reading Review    2021   CardioMEMS reviewed.  Current diuretic: Bumex 4mg daily  Recent plan of care changes: none. MEMS remains within threshold.    Current Threshold parameters:       Today's Waveform:       Readings:           RHC Date Wedge Pressure MEMS PAD during cath  (average of the 3 values)     2019 w/MEMS implant     20 mmHg   18 mmHg         Serena Streeter RN BSN CHFN  Cardiology Care Coordinator - [facility]  Questions and [phone]  First press #1 for the [employer] and then press #4 for \"Your Care Team\" to reach us Cardiology Nurses.                  "

## 2021-06-29 NOTE — TELEPHONE ENCOUNTER
losartan (COZAAR) 50 MG tablet  Last Written Prescription Date:  12/23/2020  Last Fill Quantity: 45,   # refills: 1  Last Office Visit : 3/10/2021  Future Office visit:  7/7/2021    Routing refill request to provider for review/approval because:  Abnormal Cr       Refer to clinic for review     Recent Labs   Lab Test 04/29/21  1116   CR 1.51*        Rafaela Osorio RN  Central Triage Red Flags/Med Refills

## 2021-07-01 RX ORDER — LOSARTAN POTASSIUM 50 MG/1
25 TABLET ORAL DAILY
Qty: 45 TABLET | Refills: 1 | Status: SHIPPED | OUTPATIENT
Start: 2021-07-01 | End: 2022-03-02

## 2021-07-01 NOTE — PROGRESS NOTES
Remote monitoring of Pulmonary Artery Pressures via CardioMEMS device reviewed at least weekly and as needed with CORE nursing. Diuretics adjusted accordingly.      5/24/2021 through 6/26/2021    Austin CORNEJO NP-C

## 2021-07-02 DIAGNOSIS — N18.4 CKD (CHRONIC KIDNEY DISEASE) STAGE 4, GFR 15-29 ML/MIN (H): Primary | ICD-10-CM

## 2021-07-07 ENCOUNTER — VIRTUAL VISIT (OUTPATIENT)
Dept: NEPHROLOGY | Facility: CLINIC | Age: 76
End: 2021-07-07
Attending: INTERNAL MEDICINE
Payer: MEDICARE

## 2021-07-07 VITALS — HEIGHT: 67 IN | BODY MASS INDEX: 45.99 KG/M2 | WEIGHT: 293 LBS

## 2021-07-07 DIAGNOSIS — N18.32 CHRONIC KIDNEY DISEASE, STAGE 3B (H): Primary | ICD-10-CM

## 2021-07-07 PROCEDURE — 99214 OFFICE O/P EST MOD 30 MIN: CPT | Mod: 95 | Performed by: STUDENT IN AN ORGANIZED HEALTH CARE EDUCATION/TRAINING PROGRAM

## 2021-07-07 ASSESSMENT — MIFFLIN-ST. JEOR: SCORE: 1970.07

## 2021-07-07 NOTE — PROGRESS NOTES
"Nephrology Clinic Follow Up - Virtual Visit    Assessment and Plan:     # CKD 3b with microalbuminuria : Baseline Cr ~1.5-2.0 with significant fluctuation since 2005 with microalbuminuria of 147mg/g Cr. Overall Cr trend has been stable, however will need q6 month follow up. Microalbuminuria noted however would not increase losartan due to hx of fainting and chronic low blood pressures. SGLT2 inhibitor considered, but due to body habitus and propensity for yeast infections - the risk likely outweighs the benefit at this time.  - No change in management  - Follow up with LIYAH in 6 months     # Hypertension: Well controlled/runs on soft side and has hx of fainting with stricter BP control.     # Electrolytes:   Potassium, bicarb WNL on last BMP.     # Mineral Bone Disorder:   PTH 71, Ca & Phos WNL. Vit D elevated - should not take a supplement.    I discussed the patient's plan of care with Dr. Pfeiffer.    Joann Pham MD  Nephrology Fellow      Reason for Visit:  Mariel Ribeiro is a 75 year old female with history of type II diabetes mellitus, kidney failure, TIA, CHF, CAD status post CABG, COPD, thyroid cancer status post thyroidectomy and resultant hypothyroidism, and hypertension, who presents for management of CKD and proteinuria.    HPI:  Since our last visit, Mariel has been having some headaches and changes in vision. She underwent work up for temporal arteritis including biopsy but the biopsy was negative. She has otherwise been feeling well. Her BP is well controlled and she does not have increased peripheral edema. No SOB or chest pain. No changes in urinary habits, though does feel like she alternates from \"going frequently\" to \"not going much at all.\" She follows with a Urologist.       ROS:  GEN: good energy, no f/c  RESP: no SOB  CV: No CP, no peripheral edema  ABD: no n/v    Active Medical Problems:  Patient Active Problem List   Diagnosis     Hypothyroidism     Diverticulitis of colon     Coronary " atherosclerosis     Myalgia and myositis     Migraine     Chronic airway obstruction/ COPD     Mononeuritis     Obstructive sleep apnea     Vitamin D deficiency     Hypertension goal BP (blood pressure) < 130/80     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)     Hyperlipidemia with target LDL less than 70     Chronic diastolic heart failure (H)     Osteoarthritis     Morbid obesity (H)     Arthritis of knee     Transient cerebral ischemia     History of thyroid cancer     Cervicalgia     Fatty liver disease, nonalcoholic     Hepatomegaly     Angina at rest (H)     S/P CABG x 3     Type 2 diabetes mellitus with stage 3 chronic kidney disease, with long-term current use of insulin (H)     Lymphedema     Lower back pain     Bilateral carotid artery disease (H)     Spinal stenosis of lumbar region, unspecified whether neurogenic claudication present     Status post coronary angiogram     Hemoptysis     Intertrigo     Malignant neoplasm of lower-inner quadrant of left breast in female, estrogen receptor positive (H)     Pain in the coccyx     Facet arthropathy     CKD (chronic kidney disease) stage 4, GFR 15-29 ml/min (H)     Facet arthropathy, lumbar     Temporal pain     Elevated C-reactive protein       Personal Hx:   Social History     Tobacco Use     Smoking status: Former Smoker     Packs/day: 0.00     Years: 27.00     Pack years: 0.00     Types: Cigarettes     Quit date: 1989     Years since quittin.0     Smokeless tobacco: Never Used   Substance Use Topics     Alcohol use: No     Alcohol/week: 0.0 standard drinks       Allergies: Reviewed by provider.     Medications:  Current Outpatient Medications   Medication Sig     acetaminophen (TYLENOL) 325 MG tablet Take 650 mg by mouth every 4 hours as needed for mild pain Take 2 tablets by mouth every 4 hours as needed for mild pain     albuterol (PROAIR HFA/PROVENTIL HFA/VENTOLIN HFA) 108 (90 Base) MCG/ACT inhaler Inhale 2 puffs into the lungs every 6 hours      anastrozole (ARIMIDEX) 1 MG tablet Take 1 tablet (1 mg) by mouth daily     aspirin (ASA) 81 MG EC tablet Take 1 tablet (81 mg) by mouth daily     atorvastatin (LIPITOR) 40 MG tablet TAKE 1 TABLET(40 MG) BY MOUTH DAILY     bacitracin 500 UNIT/GM external ointment Apply ointment to the incision sites three times a day.     blood glucose (ONETOUCH ULTRA) test strip Use to test blood sugar four times daily or as directed.     bumetanide (BUMEX) 1 MG tablet Take 4 mg in the morning, 2 mg in the afternoon for 3 days, then decrease to 4 mg daily.     capsaicin (ZOSTRIX) 0.025 % external cream Apply 1 g topically 3 times daily     capsaicin (ZOSTRIX) 0.025 % external cream Apply 1 g topically 3 times daily For neuropathic pain.     Continuous Blood Gluc  (ChoisterYLE MARTY 14 DAY READER) JEZ 1 Units 4 times daily (before meals and nightly)     Continuous Blood Gluc Sensor (QvolveSTYLE MARTY 14 DAY SENSOR) [facility] new sensor to back of arm q14 days to monitor blood sugars     EPINEPHrine (ANY BX GENERIC EQUIV) 0.3 MG/0.3ML injection 2-pack Inject 0.3 mLs (0.3 mg) into the muscle once as needed for anaphylaxis     escitalopram (LEXAPRO) 20 MG tablet TAKE 1 TABLET(20 MG) BY MOUTH EVERY MORNING     ferrous gluconate (FERGON) 324 (38 Fe) MG tablet TAKE 1 TABLET BY MOUTH EVERY MONDAY, WEDNESDAY, AND FRIDAY MORNING     folic acid (FOLVITE) 1 MG tablet Take 2 tablets (2 mg) by mouth daily     guaiFENesin (MUCINEX) 600 MG 12 hr tablet Take 1 tablet (600 mg) by mouth 2 times daily     insulin glargine (LANTUS SOLOSTAR) 100 UNIT/ML pen Inject  31 units  daily subcutaneously.  Call  if blood sugar <70, often >200.     [employer] FINEPOINT LANCETS MISC Use to test blood sugars 2 times daily or as directed.     losartan (COZAAR) 50 MG tablet Take 0.5 tablets (25 mg) by mouth daily     metoprolol succinate ER (TOPROL-XL) 25 MG 24 hr tablet Please take 25 mg in the morning and 50 mg at night.     miconazole (MICATIN/MICRO GUARD) 2 %  "external powder Apply topically as needed for itching or other Redness in bilateral groin and katharine clef     niacin ER (NIASPAN) 500 MG CR tablet TAKE 1 TABLET(500 MG) BY MOUTH TWICE DAILY     nitroGLYcerin (NITROSTAT) 0.4 MG sublingual tablet For chest pain place 1 tablet under the tongue every 5 minutes for 3 doses. If symptoms persist 5 minutes after 1st dose call 911.     ONE TOUCH ULTRA (DEVICES) MISC test blood sugar BID     potassium chloride ER (KLOR-CON M) 10 MEQ CR tablet Take 2 tablets (20 mEq) by mouth 2 times daily     pregabalin (LYRICA) 100 MG capsule TAKE 1 CAPSULE(100 MG) BY MOUTH TWICE DAILY     repaglinide (PRANDIN) 1 MG tablet Take 1 tablet (1 mg) by mouth 3 times daily (before meals)     Skin Protectants, Misc. ([facility] AG TEXTILE 10\"X144\") SHEE Externally apply 1 Box topically daily Apply to areas of intertrigo prn     SYNTHROID 150 MCG tablet TAKE 1 TABLET(150 MCG) BY MOUTH DAILY     tolterodine ER (DETROL LA) 2 MG 24 hr capsule TAKE 2 CAPSULES(4 MG) BY MOUTH DAILY     traMADol (ULTRAM) 50 MG tablet TAKE 1 TABLET(50 MG) BY MOUTH EVERY 6 HOURS AS NEEDED FOR BREAKTHROUGH PAIN OR SEVERE PAIN     traZODone (DESYREL) 50 MG tablet TAKE 3 TABLETS(150 MG) BY MOUTH AT BEDTIME     vitamin D3 (CHOLECALCIFEROL) 28368 units (250 mcg) capsule Take 1 capsule (10,000 Units) by mouth daily     buPROPion (WELLBUTRIN XL) 150 MG 24 hr tablet Take 1 tablet (150 mg) by mouth every morning (Patient not taking: Reported on 2021)     gabapentin (NEURONTIN) 100 MG capsule Take 1 capsule (100 mg) by mouth 3 times daily (Patient not taking: Reported on 2021)     No current facility-administered medications for this visit.      Facility-Administered Medications Ordered in Other Visits   Medication     sodium chloride (PF) 0.9% PF flush 10 mL       Vitals:  Ht 1.702 m (5' 7\")   Wt 144.2 kg (318 lb)   Breastfeeding No   BMI 49.81 kg/m      Exam:  PE deferred as encounter was a video visit.      LABS: "   CMP  Recent Labs   Lab Test 04/29/21  1116 03/18/21  1423 01/26/21 12/22/20  0928 10/01/20  1439    139  --  141 141   POTASSIUM 3.7 3.7  --  3.9 3.9   CHLORIDE 105 107  --  108 105   CO2 29 28  --  28 26   ANIONGAP 6 4  --  5 10   * 174* 106* 164* 113*   BUN 36* 32*  --  33* 31*   CR 1.51* 1.36*  --  1.52* 1.57*   GFRESTIMATED 33* 38*  --  33* 32*   GFRESTBLACK 39* 44*  --  39* 37*   ANTOINETTE 9.6 9.4  --  10.0 9.9     Recent Labs   Lab Test 04/29/21  1116 05/12/20  1420 03/25/20  1353 04/09/19  2050   BILITOTAL 0.4 0.5 0.4 0.3   ALKPHOS 98 113 97 114   ALT 23 58* 17 22   AST 15 31 14 20     CBC  Recent Labs   Lab Test 04/29/21  1116 03/18/21  1423 03/02/21  1628 05/12/20  1420   HGB 13.3 13.1 12.9 12.8   WBC 5.8 6.5 7.8 5.6   RBC 4.61 4.51 4.48 4.41   HCT 40.7 39.9 39.4 39.9   MCV 88 89 88 91   MCH 28.9 29.0 28.8 29.0   MCHC 32.7 32.8 32.7 32.1   RDW 13.0 12.5 12.6 12.6   * 106* 127* 125*     URINE STUDIES  Recent Labs   Lab Test 04/29/21  1117 03/16/21  1430 03/25/20  1253 04/30/19  1245 09/09/18  1754 11/15/17  1717 11/15/17  1717 08/23/17  1150 08/10/16  1611 08/10/16  1611   COLOR Yellow Light Yellow Light Yellow Light Yellow Light Yellow   < > Yellow Yellow   < > Yellow   APPEARANCE Clear Clear Slightly Cloudy Clear Clear   < > Clear Clear   < > Clear   URINEGLC Negative Negative Negative Negative Negative   < > Negative Negative   < > 250*   URINEBILI Negative Negative Negative Negative Negative   < > Negative Negative   < > Negative   URINEKETONE Negative Negative Negative Negative Negative   < > Negative Negative   < > Negative   SG 1.015 1.015 1.010 1.007 1.010   < > 1.010 1.010   < > 1.015   UBLD Negative Negative Trace* Negative Negative   < > Trace* Negative   < > Moderate*   URINEPH 5.0 5.5 5.5 5.5 5.5   < > 6.0 5.5   < > 5.5   PROTEIN Negative Negative Negative Negative Negative   < > Negative Negative   < > Negative   UROBILINOGEN 0.2  --   --   --   --   --  0.2 0.2  --  0.2    NITRITE Negative Negative Negative Negative Negative   < > Negative Negative   < > Negative   LEUKEST Negative Trace* Large* Small* Large*   < > Moderate* Moderate*   < > Negative   RBCU  --  1 3* <1 <1   < > O - 2 O - 2   < > O - 2   WBCU  --  4 >182* 3 3   < > 10-25* 5-10*   < > O - 2    < > = values in this interval not displayed.     Recent Labs   Lab Test 03/16/21  1430   UTPG 0.15     PTH  Recent Labs   Lab Test 03/18/21  1423   PTHI 71     IRON STUDIES  Recent Labs   Lab Test 08/15/18  1353 07/14/18  1122 07/14/18  0900 07/14/18  0700   IRON 19* 24* Unsatisfactory specimen - hemolyzed Unsatisfactory specimen - hemolyzed   * 322 Unsatisfactory specimen - hemolyzed Unsatisfactory specimen - hemolyzed   IRONSAT 4* 7* Not Calculated Not Calculated   CARMEN 22  --   --  58       Shakilajacy Pham MD    Mariel is a 76 year old who is being evaluated via a billable video visit.      How would you like to obtain your AVS? MyChart  If the video visit is dropped, the invitation should be resent by: Other e-mail: USE [employer]  Will anyone else be joining your video visit? No      Video Start Time: 4:10PM  Video-Visit Details    Type of service:  Video Visit    Video End Time:4:27PM    Originating Location (pt. Location): Home    Distant Location (provider location):  [facility] NEPHROLOGY [facility]     Platform used for Video Visit: Audio[employer]    This patient has been evaluated by me, Brady Pfeiffer MD, on 7/7/21.  I discussed with the fellow, Dr. Pham, and agree with the findings and plan in this note.  I have reviewed medications, vital signs and labs stated in this note.    Brady Pfeiffer MD

## 2021-07-07 NOTE — LETTER
7/7/2021       RE: Mariel Ribeiro  [address] [place] [address]     Dear Colleague,    Thank you for referring your patient, Mariel Ribeiro, to the [facility] NEPHROLOGY [facility]INIC [place] at [facility]. Please see a copy of my visit note below.    Nephrology Clinic Follow Up - Virtual Visit    Assessment and Plan:     # CKD 3b with microalbuminuria : Baseline Cr ~1.5-2.0 with significant fluctuation since 2005 with microalbuminuria of 147mg/g Cr. Overall Cr trend has been stable, however will need q6 month follow up. Microalbuminuria noted however would not increase losartan due to hx of fainting and chronic low blood pressures. SGLT2 inhibitor considered, but due to body habitus and propensity for yeast infections - the risk likely outweighs the benefit at this time.  - No change in management  - Follow up with LIYAH in 6 months     # Hypertension: Well controlled/runs on soft side and has hx of fainting with stricter BP control.     # Electrolytes:   Potassium, bicarb WNL on last BMP.     # Mineral Bone Disorder:   PTH 71, Ca & Phos WNL. Vit D elevated - should not take a supplement.    I discussed the patient's plan of care with Dr. Pfeiffer.    Joann Pham MD  Nephrology Fellow      Reason for Visit:  Mariel Ribeiro is a 75 year old female with history of type II diabetes mellitus, kidney failure, TIA, CHF, CAD status post CABG, COPD, thyroid cancer status post thyroidectomy and resultant hypothyroidism, and hypertension, who presents for management of CKD and proteinuria.    HPI:  Since our last visit, Mariel has been having some headaches and changes in vision. She underwent work up for temporal arteritis including biopsy but the biopsy was negative. She has otherwise been feeling well. Her BP is well controlled and she does not have increased peripheral edema. No SOB or chest pain. No changes in urinary habits, though does feel like she  "alternates from \"going frequently\" to \"not going much at all.\" She follows with a Urologist.       ROS:  GEN: good energy, no f/c  RESP: no SOB  CV: No CP, no peripheral edema  ABD: no n/v    Active Medical Problems:  Patient Active Problem List   Diagnosis     Hypothyroidism     Diverticulitis of colon     Coronary atherosclerosis     Myalgia and myositis     Migraine     Chronic airway obstruction/ COPD     Mononeuritis     Obstructive sleep apnea     Vitamin D deficiency     Hypertension goal BP (blood pressure) < 130/80     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)     Hyperlipidemia with target LDL less than 70     Chronic diastolic heart failure (H)     Osteoarthritis     Morbid obesity (H)     Arthritis of knee     Transient cerebral ischemia     History of thyroid cancer     Cervicalgia     Fatty liver disease, nonalcoholic     Hepatomegaly     Angina at rest (H)     S/P CABG x 3     Type 2 diabetes mellitus with stage 3 chronic kidney disease, with long-term current use of insulin (H)     Lymphedema     Lower back pain     Bilateral carotid artery disease (H)     Spinal stenosis of lumbar region, unspecified whether neurogenic claudication present     Status post coronary angiogram     Hemoptysis     Intertrigo     Malignant neoplasm of lower-inner quadrant of left breast in female, estrogen receptor positive (H)     Pain in the coccyx     Facet arthropathy     CKD (chronic kidney disease) stage 4, GFR 15-29 ml/min (H)     Facet arthropathy, lumbar     Temporal pain     Elevated C-reactive protein       Personal Hx:   Social History     Tobacco Use     Smoking status: Former Smoker     Packs/day: 0.00     Years: 27.00     Pack years: 0.00     Types: Cigarettes     Quit date: 1989     Years since quittin.0     Smokeless tobacco: Never Used   Substance Use Topics     Alcohol use: No     Alcohol/week: 0.0 standard drinks       Allergies: Reviewed by provider.     Medications:  Current Outpatient " Medications   Medication Sig     acetaminophen (TYLENOL) 325 MG tablet Take 650 mg by mouth every 4 hours as needed for mild pain Take 2 tablets by mouth every 4 hours as needed for mild pain     albuterol (PROAIR HFA/PROVENTIL HFA/VENTOLIN HFA) 108 (90 Base) MCG/ACT inhaler Inhale 2 puffs into the lungs every 6 hours     anastrozole (ARIMIDEX) 1 MG tablet Take 1 tablet (1 mg) by mouth daily     aspirin (ASA) 81 MG EC tablet Take 1 tablet (81 mg) by mouth daily     atorvastatin (LIPITOR) 40 MG tablet TAKE 1 TABLET(40 MG) BY MOUTH DAILY     bacitracin 500 UNIT/GM external ointment Apply ointment to the incision sites three times a day.     blood glucose (ONETOUCH ULTRA) test strip Use to test blood sugar four times daily or as directed.     bumetanide (BUMEX) 1 MG tablet Take 4 mg in the morning, 2 mg in the afternoon for 3 days, then decrease to 4 mg daily.     capsaicin (ZOSTRIX) 0.025 % external cream Apply 1 g topically 3 times daily     capsaicin (ZOSTRIX) 0.025 % external cream Apply 1 g topically 3 times daily For neuropathic pain.     Continuous Blood Gluc  (FREESTYLE MARTY 14 DAY READER) JEZ 1 Units 4 times daily (before meals and nightly)     Continuous Blood Gluc Sensor (FREESTYLE MARTY 14 DAY SENSOR) [facility] new sensor to back of arm q14 days to monitor blood sugars     EPINEPHrine (ANY BX GENERIC EQUIV) 0.3 MG/0.3ML injection 2-pack Inject 0.3 mLs (0.3 mg) into the muscle once as needed for anaphylaxis     escitalopram (LEXAPRO) 20 MG tablet TAKE 1 TABLET(20 MG) BY MOUTH EVERY MORNING     ferrous gluconate (FERGON) 324 (38 Fe) MG tablet TAKE 1 TABLET BY MOUTH EVERY MONDAY, WEDNESDAY, AND FRIDAY MORNING     folic acid (FOLVITE) 1 MG tablet Take 2 tablets (2 mg) by mouth daily     guaiFENesin (MUCINEX) 600 MG 12 hr tablet Take 1 tablet (600 mg) by mouth 2 times daily     insulin glargine (LANTUS SOLOSTAR) 100 UNIT/ML pen Inject  31 units  daily subcutaneously.  Call  if blood sugar <70, often  ">200.     [employer] FINEPOINT LANCETS MISC Use to test blood sugars 2 times daily or as directed.     losartan (COZAAR) 50 MG tablet Take 0.5 tablets (25 mg) by mouth daily     metoprolol succinate ER (TOPROL-XL) 25 MG 24 hr tablet Please take 25 mg in the morning and 50 mg at night.     miconazole (MICATIN/MICRO GUARD) 2 % external powder Apply topically as needed for itching or other Redness in bilateral groin and  clef     niacin ER (NIASPAN) 500 MG CR tablet TAKE 1 TABLET(500 MG) BY MOUTH TWICE DAILY     nitroGLYcerin (NITROSTAT) 0.4 MG sublingual tablet For chest pain place 1 tablet under the tongue every 5 minutes for 3 doses. If symptoms persist 5 minutes after 1st dose call 911.     ONE TOUCH ULTRA (DEVICES) MISC test blood sugar BID     potassium chloride ER (KLOR-CON M) 10 MEQ CR tablet Take 2 tablets (20 mEq) by mouth 2 times daily     pregabalin (LYRICA) 100 MG capsule TAKE 1 CAPSULE(100 MG) BY MOUTH TWICE DAILY     repaglinide (PRANDIN) 1 MG tablet Take 1 tablet (1 mg) by mouth 3 times daily (before meals)     Skin Protectants, Misc. ([facility] AG TEXTILE 10\"X144\") SHEE Externally apply 1 Box topically daily Apply to areas of intertrigo prn     SYNTHROID 150 MCG tablet TAKE 1 TABLET(150 MCG) BY MOUTH DAILY     tolterodine ER (DETROL LA) 2 MG 24 hr capsule TAKE 2 CAPSULES(4 MG) BY MOUTH DAILY     traMADol (ULTRAM) 50 MG tablet TAKE 1 TABLET(50 MG) BY MOUTH EVERY 6 HOURS AS NEEDED FOR BREAKTHROUGH PAIN OR SEVERE PAIN     traZODone (DESYREL) 50 MG tablet TAKE 3 TABLETS(150 MG) BY MOUTH AT BEDTIME     vitamin D3 (CHOLECALCIFEROL) 30559 units (250 mcg) capsule Take 1 capsule (10,000 Units) by mouth daily     buPROPion (WELLBUTRIN XL) 150 MG 24 hr tablet Take 1 tablet (150 mg) by mouth every morning (Patient not taking: Reported on 2021)     gabapentin (NEURONTIN) 100 MG capsule Take 1 capsule (100 mg) by mouth 3 times daily (Patient not taking: Reported on 2021)     No current " "facility-administered medications for this visit.      Facility-Administered Medications Ordered in Other Visits   Medication     sodium chloride (PF) 0.9% PF flush 10 mL       Vitals:  Ht 1.702 m (5' 7\")   Wt 144.2 kg (318 lb)   Breastfeeding No   BMI 49.81 kg/m      Exam:  PE deferred as encounter was a video visit.      LABS:   CMP  Recent Labs   Lab Test 04/29/21  1116 03/18/21  1423 01/26/21 12/22/20  0928 10/01/20  1439    139  --  141 141   POTASSIUM 3.7 3.7  --  3.9 3.9   CHLORIDE 105 107  --  108 105   CO2 29 28  --  28 26   ANIONGAP 6 4  --  5 10   * 174* 106* 164* 113*   BUN 36* 32*  --  33* 31*   CR 1.51* 1.36*  --  1.52* 1.57*   GFRESTIMATED 33* 38*  --  33* 32*   GFRESTBLACK 39* 44*  --  39* 37*   ANTOINETTE 9.6 9.4  --  10.0 9.9     Recent Labs   Lab Test 04/29/21  1116 05/12/20  1420 03/25/20  1353 04/09/19  2050   BILITOTAL 0.4 0.5 0.4 0.3   ALKPHOS 98 113 97 114   ALT 23 58* 17 22   AST 15 31 14 20     CBC  Recent Labs   Lab Test 04/29/21  1116 03/18/21  1423 03/02/21  1628 05/12/20  1420   HGB 13.3 13.1 12.9 12.8   WBC 5.8 6.5 7.8 5.6   RBC 4.61 4.51 4.48 4.41   HCT 40.7 39.9 39.4 39.9   MCV 88 89 88 91   MCH 28.9 29.0 28.8 29.0   MCHC 32.7 32.8 32.7 32.1   RDW 13.0 12.5 12.6 12.6   * 106* 127* 125*     URINE STUDIES  Recent Labs   Lab Test 04/29/21  1117 03/16/21  1430 03/25/20  1253 04/30/19  1245 09/09/18  1754 11/15/17  1717 11/15/17  1717 08/23/17  1150 08/10/16  1611 08/10/16  1611   COLOR Yellow Light Yellow Light Yellow Light Yellow Light Yellow   < > Yellow Yellow   < > Yellow   APPEARANCE Clear Clear Slightly Cloudy Clear Clear   < > Clear Clear   < > Clear   URINEGLC Negative Negative Negative Negative Negative   < > Negative Negative   < > 250*   URINEBILI Negative Negative Negative Negative Negative   < > Negative Negative   < > Negative   URINEKETONE Negative Negative Negative Negative Negative   < > Negative Negative   < > Negative   SG 1.015 1.015 1.010 1.007 1.010  "  < > 1.010 1.010   < > 1.015   UBLD Negative Negative Trace* Negative Negative   < > Trace* Negative   < > Moderate*   URINEPH 5.0 5.5 5.5 5.5 5.5   < > 6.0 5.5   < > 5.5   PROTEIN Negative Negative Negative Negative Negative   < > Negative Negative   < > Negative   UROBILINOGEN 0.2  --   --   --   --   --  0.2 0.2  --  0.2   NITRITE Negative Negative Negative Negative Negative   < > Negative Negative   < > Negative   LEUKEST Negative Trace* Large* Small* Large*   < > Moderate* Moderate*   < > Negative   RBCU  --  1 3* <1 <1   < > O - 2 O - 2   < > O - 2   WBCU  --  4 >182* 3 3   < > 10-25* 5-10*   < > O - 2    < > = values in this interval not displayed.     Recent Labs   Lab Test 03/16/21  1430   UTPG 0.15     PTH  Recent Labs   Lab Test 03/18/21  1423   PTHI 71     IRON STUDIES  Recent Labs   Lab Test 08/15/18  1353 07/14/18  1122 07/14/18  0900 07/14/18  0700   IRON 19* 24* Unsatisfactory specimen - hemolyzed Unsatisfactory specimen - hemolyzed   * 322 Unsatisfactory specimen - hemolyzed Unsatisfactory specimen - hemolyzed   IRONSAT 4* 7* Not Calculated Not Calculated   CARMEN 22  --   --  58       Shakila Pham MD    Mariel is a 76 year old who is being evaluated via a billable video visit.      How would you like to obtain your AVS? [email or web address]  If the video visit is dropped, the invitation should be resent by: Other e-mail: USE [employer]  Will anyone else be joining your video visit? No      Video Start Time: 4:10PM  Video-Visit Details    Type of service:  Video Visit    Video End Time:4:27PM    Originating Location (pt. Location): Home    Distant Location (provider location):  [facility] NEPHROLOGY [facility]     Platform used for Video Visit: [employer]    This patient has been evaluated by me, Brady Pfeiffer MD, on 7/7/21.  I discussed with the fellow, Dr. Pham, and agree with the findings and plan in this note.  I have reviewed medications, vital signs and labs stated in this note.     Brady Pfeiffer MD

## 2021-07-08 NOTE — PROGRESS NOTES
[facility] - CardioMEMS Reading Review    July 8, 2021   CardioMEMS reviewed.  Current diuretic: Bumex 4 mg daily  Recent plan of care changes: none. PAD in goal range.     Current Threshold parameters:       Today's Waveform:       Readings:           RHC Date Wedge Pressure MEMS PAD during cath  (average of the 3 values)     7/1/2019 w/MEMS implant     20 mmHg   18 mmHg           Rosina Mays RN

## 2021-07-09 ENCOUNTER — HOSPITAL ENCOUNTER (EMERGENCY)
Facility: CLINIC | Age: 76
Discharge: HOME | End: 2021-07-09
Attending: EMERGENCY MEDICINE | Admitting: EMERGENCY MEDICINE
Payer: MEDICARE

## 2021-07-09 ENCOUNTER — APPOINTMENT (OUTPATIENT)
Dept: GENERAL RADIOLOGY | Facility: CLINIC | Age: 76
End: 2021-07-09
Attending: EMERGENCY MEDICINE
Payer: MEDICARE

## 2021-07-09 ENCOUNTER — PATIENT OUTREACH (OUTPATIENT)
Dept: OTHER | Facility: CLINIC | Age: 76
End: 2021-07-09

## 2021-07-09 VITALS
WEIGHT: 293 LBS | HEIGHT: 66 IN | DIASTOLIC BLOOD PRESSURE: 61 MMHG | HEART RATE: 66 BPM | RESPIRATION RATE: 13 BRPM | SYSTOLIC BLOOD PRESSURE: 168 MMHG | OXYGEN SATURATION: 94 % | TEMPERATURE: 98 F | BODY MASS INDEX: 47.09 KG/M2

## 2021-07-09 DIAGNOSIS — R06.02 SHORTNESS OF BREATH: ICD-10-CM

## 2021-07-09 DIAGNOSIS — R07.89 OTHER CHEST PAIN: ICD-10-CM

## 2021-07-09 DIAGNOSIS — R07.9 CHEST PAIN, UNSPECIFIED TYPE: ICD-10-CM

## 2021-07-09 LAB
ALBUMIN SERPL-MCNC: 3.5 G/DL (ref 3.4–5)
ALP SERPL-CCNC: 108 U/L (ref 40–150)
ALT SERPL W P-5'-P-CCNC: 22 U/L (ref 0–50)
ANION GAP SERPL CALCULATED.3IONS-SCNC: 1 MMOL/L (ref 3–14)
AST SERPL W P-5'-P-CCNC: 20 U/L (ref 0–45)
BASOPHILS # BLD AUTO: 0 10E9/L (ref 0–0.2)
BASOPHILS NFR BLD AUTO: 0.4 %
BILIRUB SERPL-MCNC: 0.4 MG/DL (ref 0.2–1.3)
BUN SERPL-MCNC: 38 MG/DL (ref 7–30)
CALCIUM SERPL-MCNC: 9.6 MG/DL (ref 8.5–10.1)
CHLORIDE SERPL-SCNC: 107 MMOL/L (ref 94–109)
CO2 SERPL-SCNC: 33 MMOL/L (ref 20–32)
CREAT SERPL-MCNC: 1.31 MG/DL (ref 0.52–1.04)
DIFFERENTIAL METHOD BLD: ABNORMAL
EOSINOPHIL # BLD AUTO: 0.2 10E9/L (ref 0–0.7)
EOSINOPHIL NFR BLD AUTO: 2.3 %
ERYTHROCYTE [DISTWIDTH] IN BLOOD BY AUTOMATED COUNT: 13.3 % (ref 10–15)
GFR SERPL CREATININE-BSD FRML MDRD: 40 ML/MIN/{1.73_M2}
GLUCOSE SERPL-MCNC: 134 MG/DL (ref 70–99)
HCT VFR BLD AUTO: 39.3 % (ref 35–47)
HGB BLD-MCNC: 12.8 G/DL (ref 11.7–15.7)
IMM GRANULOCYTES # BLD: 0 10E9/L (ref 0–0.4)
IMM GRANULOCYTES NFR BLD: 0.4 %
LIPASE SERPL-CCNC: 404 U/L (ref 73–393)
LYMPHOCYTES # BLD AUTO: 1.9 10E9/L (ref 0.8–5.3)
LYMPHOCYTES NFR BLD AUTO: 22.9 %
MCH RBC QN AUTO: 29 PG (ref 26.5–33)
MCHC RBC AUTO-ENTMCNC: 32.6 G/DL (ref 31.5–36.5)
MCV RBC AUTO: 89 FL (ref 78–100)
MONOCYTES # BLD AUTO: 0.6 10E9/L (ref 0–1.3)
MONOCYTES NFR BLD AUTO: 7.6 %
NEUTROPHILS # BLD AUTO: 5.4 10E9/L (ref 1.6–8.3)
NEUTROPHILS NFR BLD AUTO: 66.4 %
NRBC # BLD AUTO: 0 10*3/UL
NRBC BLD AUTO-RTO: 0 /100
NT-PROBNP SERPL-MCNC: 363 PG/ML (ref 0–1800)
PLATELET # BLD AUTO: 123 10E9/L (ref 150–450)
POTASSIUM SERPL-SCNC: 3.7 MMOL/L (ref 3.4–5.3)
PROT SERPL-MCNC: 6.8 G/DL (ref 6.8–8.8)
RBC # BLD AUTO: 4.41 10E12/L (ref 3.8–5.2)
SODIUM SERPL-SCNC: 142 MMOL/L (ref 133–144)
TROPONIN I SERPL-MCNC: <0.015 UG/L (ref 0–0.04)
WBC # BLD AUTO: 8.2 10E9/L (ref 4–11)

## 2021-07-09 PROCEDURE — 99285 EMERGENCY DEPT VISIT HI MDM: CPT | Mod: 25 | Performed by: EMERGENCY MEDICINE

## 2021-07-09 PROCEDURE — 83880 ASSAY OF NATRIURETIC PEPTIDE: CPT | Performed by: EMERGENCY MEDICINE

## 2021-07-09 PROCEDURE — 85025 COMPLETE CBC W/AUTO DIFF WBC: CPT | Performed by: EMERGENCY MEDICINE

## 2021-07-09 PROCEDURE — 71046 X-RAY EXAM CHEST 2 VIEWS: CPT

## 2021-07-09 PROCEDURE — 93005 ELECTROCARDIOGRAM TRACING: CPT

## 2021-07-09 PROCEDURE — 80053 COMPREHEN METABOLIC PANEL: CPT | Performed by: EMERGENCY MEDICINE

## 2021-07-09 PROCEDURE — 84484 ASSAY OF TROPONIN QUANT: CPT | Performed by: EMERGENCY MEDICINE

## 2021-07-09 PROCEDURE — 93010 ELECTROCARDIOGRAM REPORT: CPT | Performed by: EMERGENCY MEDICINE

## 2021-07-09 PROCEDURE — 83690 ASSAY OF LIPASE: CPT | Performed by: EMERGENCY MEDICINE

## 2021-07-09 PROCEDURE — 71046 X-RAY EXAM CHEST 2 VIEWS: CPT | Mod: 26 | Performed by: RADIOLOGY

## 2021-07-09 PROCEDURE — 99285 EMERGENCY DEPT VISIT HI MDM: CPT | Mod: 25

## 2021-07-09 ASSESSMENT — MIFFLIN-ST. JEOR: SCORE: 1954.19

## 2021-07-09 NOTE — ED TRIAGE NOTES
Pt C/O chest pain that started yesterday and increasing SOB. Pt reports a fall yesterday night, denies hitting her head. Chest pain is currently 7/10, midsternal and pinching in nature. Hypertensive per pt baseline, otherwise AVSS on RA. A&Ox4. Hx of heart failure & CABG x3.
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
## 2021-07-09 NOTE — PROGRESS NOTES
"Mariel reached out to the [facility] Par[facility] because she thought we had an appointment today, which I had scheduled for next Friday.    Mariel then told me about intermittent chest pain for the last 2 days that has increased today.  She reports that it is mid sternum and sometimes radiates into her head.  She also reports that it is more on the L side and increases with palpation.  She is also reporting a productive cough with \"applesauce\" color.  Her home BP the last 2 days have been 110-120 systolic and her O2 has marivel in the mid 90s.    Mariel states she had a fall last night in her bedroom and was on the floor for about 1/2 hour.  She is not sure why she fell and denies any injuries.  She was able to get back up with her cane and crawling across the floor.    I strongly advised that she should be evaluated in the ER for these concerns, or at the minimum call the nurse line for direction to which she agreed.       Rafaela ROMAN, NRP  [facility]  Phone number [phone]  Email [email or web address]     "

## 2021-07-10 LAB
ATRIAL RATE - MUSE: 66 BPM
DIASTOLIC BLOOD PRESSURE - MUSE: NORMAL MMHG
INTERPRETATION ECG - MUSE: NORMAL
P AXIS - MUSE: 48 DEGREES
PR INTERVAL - MUSE: 180 MS
QRS DURATION - MUSE: 84 MS
QT - MUSE: 414 MS
QTC - MUSE: 434 MS
R AXIS - MUSE: -23 DEGREES
SYSTOLIC BLOOD PRESSURE - MUSE: NORMAL MMHG
T AXIS - MUSE: 96 DEGREES
VENTRICULAR RATE- MUSE: 66 BPM

## 2021-07-12 ENCOUNTER — PATIENT OUTREACH (OUTPATIENT)
Dept: NURSING | Facility: CLINIC | Age: 76
End: 2021-07-12
Payer: MEDICARE

## 2021-07-12 ENCOUNTER — TELEPHONE (OUTPATIENT)
Dept: NEPHROLOGY | Facility: CLINIC | Age: 76
End: 2021-07-12

## 2021-07-12 ENCOUNTER — PATIENT OUTREACH (OUTPATIENT)
Dept: CARDIOLOGY | Facility: CLINIC | Age: 76
End: 2021-07-12

## 2021-07-12 ENCOUNTER — ALLIED HEALTH/NURSE VISIT (OUTPATIENT)
Dept: OTHER | Facility: CLINIC | Age: 76
End: 2021-07-12
Payer: MEDICARE

## 2021-07-12 VITALS
HEART RATE: 60 BPM | TEMPERATURE: 98 F | OXYGEN SATURATION: 95 % | RESPIRATION RATE: 16 BRPM | SYSTOLIC BLOOD PRESSURE: 114 MMHG | DIASTOLIC BLOOD PRESSURE: 62 MMHG

## 2021-07-12 DIAGNOSIS — R53.83 OTHER FATIGUE: Primary | ICD-10-CM

## 2021-07-12 DIAGNOSIS — I50.32 CHRONIC DIASTOLIC HEART FAILURE (H): Primary | ICD-10-CM

## 2021-07-12 PROCEDURE — 99207 PR COMMUNITY PARAMEDIC - PATIENT NOT BILLABLE: CPT

## 2021-07-12 ASSESSMENT — ACTIVITIES OF DAILY LIVING (ADL)
DEPENDENT_IADLS:: SHOPPING
DEPENDENT_IADLS:: SHOPPING

## 2021-07-12 NOTE — PROGRESS NOTES
Clinic Care Coordination Contact    Clinic Care Coordination Contact  OUTREACH    Referral Information:  Referral Source: ED Follow-Up from 7/9/2021 ED visit    Primary Diagnosis:Chest Pain    Chief Complaint   Patient presents with     Clinic Care Coordination - Post Hospital     ED visit on 7/9/2021        [place] Utilization:   Clinic Utilization  Difficulty keeping appointments:: No  Compliance Concerns: No  No-Show Concerns: No  No PCP office visit in Past Year: No  Utilization    Hospital Admissions  0             ED Visits  1             No Show Count (past year)  2                Current as of: 7/11/2021  5:56 PM              Clinical Concerns:  Current Medical Concerns: Bruised elbow from a fall    Current Behavioral Concerns: Knowledge deficit    Education Provided to patient:     Education provided regarding pain relief for elbow pain    Education provided regarding safe ambulation    Education provided regarding healthy diet    Education provided regarding importance of follow up with PCP and cardiology clinics    Education provided regarding signs and symptoms to report to care team or seek medical attention for     Pain  Pain: Yes  Type: Acute (<3mo)  Location of chronic pain: sternum  Radiating: No  Progression: Unchanged  Description of pain: Aching  Chronic pain severity: 4  Limitation of routine activities due to chronic pain:: No   Description: Able to do most things most days with some rest  Alleviating Factors: Rest, Pain Medication  Aggravating Factors: Activity  Health Maintenance Reviewed: Due/Overdue   Clinical Pathway: None    Medication Management:  Medication review status: Medications not reviewed. Patient reports her roommate manages her medications.    Functional Status:  Dependent ADLs:: Ambulation-cane, Ambulation-walker  Dependent IADLs:: Shopping  Bed or wheelchair confined:: No  Mobility Status: Independent w/Device  Fallen 2 or more times in the past year?: Yes  Any fall with  injury in the past year?: Yes    Living Situation:  Current living arrangement:: Other, I live in a private home (i live in a house with a roommate)  Type of residence:: Private home - stairs (unknown)    Lifestyle & Psychosocial Needs:    Social Determinants of Health     Tobacco Use: Medium Risk     Smoking Tobacco Use: Former Smoker     Smokeless Tobacco Use: Never Used   Alcohol Use:      Frequency of Alcohol Consumption:      Average Number of Drinks:      Frequency of Binge Drinking:    Financial Resource Strain:      Difficulty of Paying Living Expenses:    Food Insecurity:      Worried About Running Out of Food in the Last Year:      Ran Out of Food in the Last Year:    Transportation Needs:      Lack of Transportation (Medical):      Lack of Transportation (Non-Medical):    Physical Activity:      Days of Exercise per Week:      Minutes of Exercise per Session:    Stress:      Feeling of Stress :    Social Connections:      Frequency of Communication with Friends and Family:      Frequency of Social Gatherings with Friends and Family:      Attends Denominational Services:      Active Member of Clubs or Organizations:      Attends Club or Organization Meetings:      Marital Status:    Intimate Partner Violence:      Fear of Current or Ex-Partner:      Emotionally Abused:      Physically Abused:      Sexually Abused:    Depression: Not at risk     PHQ-2 Score: 0   Housing Stability:      Unable to Pay for Housing in the Last Year:      Number of Places Lived in the Last Year:      Unstable Housing in the Last Year:      Diet:: Diabetic diet  Inadequate nutrition: No  Tube Feeding: No  Inadequate activity/exercise: Yes  Significant changes in sleep pattern (GOAL): No  Transportation means:: Friend, Regular car     Denominational or spiritual beliefs that impact treatment: No  Mental health DX:Yes  Mental health DX how managed:Medication  Mental health management concern: No  Chemical Dependency Status: No Current  Concerns  Informal Support system: Friends        Resources and Interventions:  Current Resources:      Community Resources: [facility], Other (see comment) (community paramedic)  Supplies used at home: Diabetic Supplies  Equipment Currently Used at Home: cane, straight, walker, rolling  Employment Status: retired     Advance Care Plan/Directive  Advanced Care Plans/Directives on file:: Yes  Type Advanced Care Plans/Directives: Advanced Directive - On File  Advanced Care Plan/Directive Status: Not Applicable    Referrals Placed: None     Goals:   Goals        General     Monitoring (pt-stated)      Notes - Note edited  5/17/2021  3:29 PM by Senia Durán RN     Goal Statement: In the next three months, I will check my blood sugar, weight, blood pressure, pulse and oxygen level daily.   Date Goal set: 3/19/2021  Barriers: None identified   Strengths:Motivated   Date to Achieve By: 7/19/2021  Patient expressed understanding of goal: Yes    Action steps to achieve this goal:  1. I will call the clinic if my blood pressure is consistently over 140/90, my oxygen is consistently below 90%, my pulse is elevated or my blood sugar is in the abnormal range (Normal range ).  2. I will continue to focus on eating 3 healthy meals every day.  3. I will schedule and attend future specialty and primary care provider appointments.   4. I will give the CHW updates at outreach calls.    Updated: 4/12/21            Patient/Caregiver understanding:Patient verbalizes understanding of goals and post-ED instructions    Outreach Frequency: monthly  Future Appointments              Today  COMMUNITY PARAMEDIC 3 [facility] Community Paramedic, [facility] JOYCELYN    In 4 weeks Muriel iWll PA-C [facility] Endocrinology [facility]    In 2 months [facility]    In 2 months Opal Weber MD [facility]           Plan:     Patient will schedule follow up visit with PCP and cardiology    Patient will seek medication attention for concerning symptoms    Patient will continue to accept community paramedic services    Patient will continue to accept monthly CHW check in calls to discusses goals and resources    RNCC will review chart in 6 weeks    Peggy Raines RN Care Coordinator     [facility]  [facility] and   [email or web address]    Office: [phone]

## 2021-07-12 NOTE — PROGRESS NOTES
Community Paramedics Follow-up Visit  July 12, 2021  TIME: 1100    Mariel Ribeiro is a 76 year old female being seen at home for a follow-up visit.    Present at appointment: Patient    Primary Diagnosis: Frailty/Failure to thrive    Chief Complaint   Patient presents with     Outreach     Community Paramedic visit       [place] Utilization:   Clinic Utilization  Difficulty keeping appointments:: No  Compliance Concerns: No  No-Show Concerns: No  No PCP office visit in Past Year: No  Utilization    Hospital Admissions  0             ED Visits  1             No Show Count (past year)  2                Current as of: 7/11/2021  5:56 PM              /62   Pulse 60   Temp 98  F (36.7  C)   Resp 16   SpO2 95%     Clinical Concerns:  Current Medical Concerns:  Lump in throat    Current Behavioral Concerns: na    Education Provided to patient: no   Medication set up? No  Pill Box issued: No  Scale issued: No  Flu Shot given: No  Lab draw or specimen collection: No  Food box issued: No  Collaborative visit with PCP: No    Health Maintenance Reviewed: Due/Overdue     Clinical Pathway: None    No  Face to Face in Home / Community    Recent blood sugars: 123    Review of Symptoms/PE    Skin: negative  Eyes: negative, visual blurring  Ears/Nose/Throat: Lump on R neck just under the chin.  Respiratory: Dyspnea on exertion-   Cardiovascular: negative  Gastrointestinal: negative  Genitourinary: negative  Musculoskeletal: back pain and neck pain  Neurologic: headaches, numbness or tingling of hands and numbness or tingling of feet  Psychiatric: negative    Pain Management::  Pain (GOAL):: Yes  Type: Acute (<3mo)  Location of chronic pain:: coccyx-? fracture due to fall per chiropractor  Radiating: No  Progression: Waxing and Waning  Description of pain: Aching, Nagging  Chronic pain severity:: 5  Limitation of routine activities due to chronic pain:: Yes (limits walking due to pain)  Description: Able to do most things  most days with some rest  Alleviating Factors: Rest, Ice, Heat, Other (got thick mattress pad for her bed; sits on pillows)  Aggravating Factors: Activity    Medication Review  Medication adherence problem (GOAL):: No  Knowledgeable about how to use meds:: Yes  Medication side effects suspected:: No    Diet/Exercise/Sleep  Diet:: Diabetic diet  Inadequate nutrition (GOAL):: No  Tube Feeding: No  Inadequate activity/exercise (GOAL):: Yes  Significant changes in sleep pattern (GOAL): No    Plan:     Time spent with patient: 60    The patient meets one or more of the following criteria:  * Requires services to prevent readmission to a nursing home or hospital    Acute concern/Follow-up recommendations: Continue tx plan    Next CP visit scheduled: 7/26/21    Issues for Provider to follow up on:  The Community Paramedic had a follow up visit with Mariel after her visit to the ER.  She reports that she will occasionally have chest pain but now only when she coughs.  She said she had another fall 2 night ago while getting up to go to the bathroom and was because her knees gave out.  She denies a LOC but does have bruising on her L arm.   She would like a referral for PT..     Mariel has a hard mass in her neck on the R side of her trachea, just below her chin.  She has had it for 2 months and has forgot to mention it to her PCP during her appointments.  She also had stitches in her head one month ago from a temporal artery biopsy.  She reports itching and tenderness, no signs of infection.     Mariel states she will schedule a follow up appointment with her PCP and would like these concerns addressed.     Provider follow up visit needed: Multiple upcoming.

## 2021-07-12 NOTE — TELEPHONE ENCOUNTER
Called Mariel to check in as went to ER on Friday for chest pain. Initial workup negative, was advised to stay for troponin monitoring but declined admission.   Needs cardiology follow up scheduled.   Left voicemail and asked for call back or reply to [employer] message. Message sent. Rosina Mays RN

## 2021-07-12 NOTE — TELEPHONE ENCOUNTER
Lvm for patient to call us back to schedule a 6 month follow up appointment with Allie Reich with labs Prior.

## 2021-07-14 NOTE — PROGRESS NOTES
[facility] - CardioMEMS Reading Review    July 14, 2021   CardioMEMS reviewed.  Current diuretic: Bumex 4 mg daily  Recent plan of care changes: Recent ER visit for CP. Left message and mychart sent to schedule follow up. PAD in goal range.     Current Threshold parameters:       Today's Waveform:       Readings:           RHC Date Wedge Pressure MEMS PAD during cath  (average of the 3 values)     7/1/2019 w/MEMS implant     20 mmHg   18 mmHg           Rosina Mays RN

## 2021-07-14 NOTE — TELEPHONE ENCOUNTER
"Called Mariel to check in per message below. Went to ER on Friday. CP is better but it \"comes and goes\". Difficult to get more detail from her than this. Scheduled for follow up CORE appt tomorrow 7/15 with labs prior.   "

## 2021-07-15 ENCOUNTER — LAB (OUTPATIENT)
Dept: LAB | Facility: CLINIC | Age: 76
End: 2021-07-15
Attending: PHYSICIAN ASSISTANT
Payer: MEDICARE

## 2021-07-15 ENCOUNTER — OFFICE VISIT (OUTPATIENT)
Dept: CARDIOLOGY | Facility: CLINIC | Age: 76
End: 2021-07-15
Attending: PHYSICIAN ASSISTANT
Payer: MEDICARE

## 2021-07-15 VITALS
HEIGHT: 67 IN | HEART RATE: 51 BPM | SYSTOLIC BLOOD PRESSURE: 147 MMHG | DIASTOLIC BLOOD PRESSURE: 64 MMHG | OXYGEN SATURATION: 97 % | BODY MASS INDEX: 45.99 KG/M2 | WEIGHT: 293 LBS

## 2021-07-15 DIAGNOSIS — N18.4 CKD (CHRONIC KIDNEY DISEASE) STAGE 4, GFR 15-29 ML/MIN (H): ICD-10-CM

## 2021-07-15 DIAGNOSIS — I50.32 CHRONIC DIASTOLIC HEART FAILURE (H): ICD-10-CM

## 2021-07-15 DIAGNOSIS — R00.2 PALPITATIONS: ICD-10-CM

## 2021-07-15 DIAGNOSIS — R00.2 PALPITATIONS: Primary | ICD-10-CM

## 2021-07-15 LAB
ALBUMIN SERPL-MCNC: 3.4 G/DL (ref 3.4–5)
ANION GAP SERPL CALCULATED.3IONS-SCNC: 2 MMOL/L (ref 3–14)
BUN SERPL-MCNC: 30 MG/DL (ref 7–30)
CALCIUM SERPL-MCNC: 9.9 MG/DL (ref 8.5–10.1)
CHLORIDE BLD-SCNC: 111 MMOL/L (ref 94–109)
CO2 SERPL-SCNC: 27 MMOL/L (ref 20–32)
CREAT SERPL-MCNC: 1.34 MG/DL (ref 0.52–1.04)
ERYTHROCYTE [DISTWIDTH] IN BLOOD BY AUTOMATED COUNT: 13.3 % (ref 10–15)
GFR SERPL CREATININE-BSD FRML MDRD: 39 ML/MIN/1.73M2
GLUCOSE BLD-MCNC: 121 MG/DL (ref 70–99)
HCT VFR BLD AUTO: 41.1 % (ref 35–47)
HGB BLD-MCNC: 13.1 G/DL (ref 11.7–15.7)
MCH RBC QN AUTO: 29.4 PG (ref 26.5–33)
MCHC RBC AUTO-ENTMCNC: 31.9 G/DL (ref 31.5–36.5)
MCV RBC AUTO: 92 FL (ref 78–100)
PHOSPHATE SERPL-MCNC: 3.4 MG/DL (ref 2.5–4.5)
PLATELET # BLD AUTO: 108 10E3/UL (ref 150–450)
POTASSIUM BLD-SCNC: 4.4 MMOL/L (ref 3.4–5.3)
RBC # BLD AUTO: 4.45 10E6/UL (ref 3.8–5.2)
SODIUM SERPL-SCNC: 140 MMOL/L (ref 133–144)
WBC # BLD AUTO: 6.1 10E3/UL (ref 4–11)

## 2021-07-15 PROCEDURE — 93248 EXT ECG>7D<15D REV&INTERPJ: CPT | Performed by: PHYSICIAN ASSISTANT

## 2021-07-15 PROCEDURE — 99214 OFFICE O/P EST MOD 30 MIN: CPT | Performed by: PHYSICIAN ASSISTANT

## 2021-07-15 PROCEDURE — 80069 RENAL FUNCTION PANEL: CPT | Performed by: PATHOLOGY

## 2021-07-15 PROCEDURE — 93242 EXT ECG>48HR<7D RECORDING: CPT

## 2021-07-15 PROCEDURE — 36415 COLL VENOUS BLD VENIPUNCTURE: CPT | Performed by: PATHOLOGY

## 2021-07-15 PROCEDURE — 85027 COMPLETE CBC AUTOMATED: CPT | Performed by: PATHOLOGY

## 2021-07-15 PROCEDURE — G0463 HOSPITAL OUTPT CLINIC VISIT: HCPCS

## 2021-07-15 ASSESSMENT — MIFFLIN-ST. JEOR: SCORE: 1963.26

## 2021-07-15 ASSESSMENT — PAIN SCALES - GENERAL: PAINLEVEL: NO PAIN (0)

## 2021-07-15 NOTE — PROGRESS NOTES
Per Magdalena Hall, patient to have Zio monitor placed.  Diagnosis: Palpitations  Monitor placed: Yes  Patient Instructed: Yes  Patient verbalized understanding: Yes  Holter # [id number]
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
## 2021-07-15 NOTE — NURSING NOTE
Diet: Patient instructed regarding a heart healthy diet, including discussion of reduced fat and sodium intake. Patient demonstrated understanding of this information and agreed to call with further questions or concerns.  Labs: Patient was given results of the laboratory testing obtained today. Patient was instructed to return for the next laboratory testing in 3 months with Dr Luciano turner. Patient demonstrated understanding of this information and agreed to call with further questions or concerns.   Return Appointment: Patient given instructions regarding scheduling next clinic visit. Patient demonstrated understanding of this information and agreed to call with further questions or concerns.  Patient did not want to schedule right now as she had to get home but agrees she will call to schedule follow up appointments.   Patient stated she understood all health information given and agreed to call with further questions or concerns.   Rosina Mays RN

## 2021-07-15 NOTE — NURSING NOTE
Chief Complaint   Patient presents with     Follow Up     74 year old female with chronic diastolic heart failure presents for follow up with labs prior      Vitals were taken and medications reconciled.    Daniel Gaytan, EMT  10:07 AM

## 2021-07-15 NOTE — PATIENT INSTRUCTIONS
Take your medicines every day, as directed    Changes made today:  o Please call your sleep doctor about the problems with your CPAP machine  o We will place a ziopatch today  o It is okay to do an exercise program, call us if you need a prescription for this    Monitor Your Weight and Symptoms    Contact us if you:      Gain 2 pounds in one day or 5 pounds in one week    Feel more short of breath    Notice more leg swelling    Feel lightheadeded   Change your lifestyle    Limit Salt or Sodium:    2000 mg  Limit Fluids:    2000 mL or approximately 64 ounces  Eat a Heart Healthy Diet    Low in saturated fats  Stay Active:    Aim to move at least 150 minutes every  week         To Contact us    During Business Hours:  [phone], option # 1 (University)  Then option # 4 (medical questions)     After hours, weekends or holidays:   [phone], Option #4  Ask to speak to the On-Call Cardiologist. Inform them you are a CORE/heart failure patient at the [facility].     Use [employer] allows you to communicate directly with your heart team through secure messaging.    [facility] can be accessed any time on your phone, computer, or tablet.    If you need assistance, we'd be happy to help!         Keep your Heart Appointments:    - Reschedule with Dr. Wolf and an ECHO in 3 months  - See renuka in 6 months

## 2021-07-21 NOTE — PROGRESS NOTES
[facility] - CardioMEMS Reading Review    July 21, 2021   CardioMEMS reviewed.  Current diuretic: Bumex 4 mg daily  Recent plan of care changes: none. PAD in goal range. Elevated today, will monitor trend.     Current Threshold parameters:       Today's Waveform:       Readings:           RHC Date Wedge Pressure MEMS PAD during cath  (average of the 3 values)     7/1/2019 w/MEMS implant     20 mmHg   18 mmHg           Rosina Mays RN

## 2021-07-25 ENCOUNTER — ALLIED HEALTH/NURSE VISIT (OUTPATIENT)
Dept: CARDIOLOGY | Facility: CLINIC | Age: 76
End: 2021-07-25
Attending: NURSE PRACTITIONER
Payer: MEDICARE

## 2021-07-25 DIAGNOSIS — I50.32 CHRONIC DIASTOLIC HEART FAILURE (H): Primary | ICD-10-CM

## 2021-07-25 PROCEDURE — 93264 REM MNTR WRLS P-ART PRS SNR: CPT | Performed by: NURSE PRACTITIONER

## 2021-07-26 ENCOUNTER — ALLIED HEALTH/NURSE VISIT (OUTPATIENT)
Dept: OTHER | Facility: CLINIC | Age: 76
End: 2021-07-26
Payer: MEDICARE

## 2021-07-26 ENCOUNTER — PATIENT OUTREACH (OUTPATIENT)
Dept: NURSING | Facility: CLINIC | Age: 76
End: 2021-07-26
Payer: MEDICARE

## 2021-07-26 VITALS
RESPIRATION RATE: 14 BRPM | OXYGEN SATURATION: 94 % | SYSTOLIC BLOOD PRESSURE: 118 MMHG | HEART RATE: 61 BPM | DIASTOLIC BLOOD PRESSURE: 64 MMHG

## 2021-07-26 DIAGNOSIS — M54.50 LOWER BACK PAIN: Primary | ICD-10-CM

## 2021-07-26 PROCEDURE — 99207 PR COMMUNITY PARAMEDIC - PATIENT NOT BILLABLE: CPT

## 2021-07-26 NOTE — PROGRESS NOTES
Community Paramedics Follow-up Visit  July 26, 2021  TIME: 1330    Mariel Ribeiro is a 76 year old female being seen at home for a follow-up visit.    Present at appointment: Patient, roommate         Chief Complaint   Patient presents with     Outreach     Community Paramedic visit       Universal Utilization:      Utilization    Hospital Admissions  0             ED Visits  1             No Show Count (past year)  2                Current as of: 7/26/2021 12:58 PM              /64   Pulse 61   Resp 14   SpO2 94%     Clinical Concerns:  Current Medical Concerns:  Mass in neck, headaches, tail bone pain.   Current Behavioral Concerns: none identified    Education Provided to patient: no   Medication set up? No  Pill Box issued: No  Scale issued: No  Flu Shot given: No  Lab draw or specimen collection: No  Food box issued: No  Collaborative visit with PCP: No    Health Maintenance Reviewed:      Clinical Pathway: None    No  Face to Face in Home / Community    Recent blood sugars: 190@1343,  fasting    Review of Symptoms/PE    Skin: negative  Eyes: visual blurring  Ears/Nose/Throat: negative  Respiratory: Dyspnea on exertion-  and Cough-   Cardiovascular: dyspnea on exertion  Gastrointestinal: negative  Genitourinary: frequency  Musculoskeletal: back pain and injury to tail bone, knees  Neurologic: headaches, numbness or tingling of hands and numbness or tingling of feet  Psychiatric: negative    Pain Management::                 Plan:     Time spent with patient: 60    The patient meets one or more of the following criteria:  * Requires services to prevent readmission to a nursing home or hospital    Acute concern/Follow-up recommendations: Continue care plan    Next CP visit scheduled: 8/16/21      Issues for Provider to follow up on: Mariel still is having multiple problems she would like to discuss with her PCP at an upcoming appointment on 8/9/21.  She has a mass on the R side of her trachea under her  chin.  She as had it for a few months and it seems to be growing in size.  She states that when lying down it will interfere with her breathing.     Mariel reports sliding off of the bed and landing on her tailbone, 2 nights ago, currently 7/10 pain.  She states she was able to get to her knees to get back up, but in the process hurt her knees and now is complaining of pain and weakness in them.  Her tailbone and R sciatic is very painful to palpation.  She reports cold packs and reclining will help reduce the pain.     Mariel had a left temporal artery biopsy on 6/14.  She reports still having headaches and visual disturbance in her L eye.  She also has pain at the site of the biopsy.        Provider follow up visit needed: 8/9/21

## 2021-07-26 NOTE — PROGRESS NOTES
Clinic Care Coordination Contact  Community Health Worker Follow Up  Spoke with patient and Odalis (Roommate on PHI)      Goals:   Goals Addressed as of 7/26/2021 at 11:38 AM                    Today    5/17/21       Monitoring (pt-stated)   30%  30%    Added 3/22/21 by Senia Durán, RN      Goal Statement: In the next three months, I will check my blood sugar, weight, blood pressure, pulse and oxygen level daily.   Date Goal set: 3/19/2021  Barriers: None identified   Strengths:Motivated   Date to Achieve By: 7/19/2021  Patient expressed understanding of goal: Yes    Action steps to achieve this goal:  1. I will call the clinic if my blood pressure is consistently over 140/90, my oxygen is consistently below 90%, my pulse is elevated or my blood sugar is in the abnormal range (Normal range ).  2. I will continue to focus on eating 3 healthy meals every day.  3. I will schedule and attend future specialty and primary care provider appointments.   4. I will give the CHW updates at outreach calls.    Updated: 4/12/21 7/26/21 CHW    CHW did not review above with patient and mostly spoke with patients roommate Odalis. CHW encouraged calling CC if any support or resources are needed.        Intervention and Education during outreach: Odalis picked up call, asked who was calling for patient and states that patient is present on speaker phone. CHW introduced self and role with CC to patient and her roommate Odalis. CHW inquired if patient needs any support or resources however Odalis states that patient works closely with JOBY Russo and she will be coming out today. Odalis states that Rafaela usually takes care of what patient needs. Odalis states that she's trying to help patient schedule an appointment to meet Dr. Hernandes at [facility]. CC offered to transfer the call to scheduling however disconnected with patient in the process of transferring. CHW called patient back twice however only getting busy  signal. CHW left message apologizing for inconvenience and left contact for [facility] scheduling [phone].     CHW called again and spoke with [place]. CHW apologized for call dropped. Odalis states, will have Rafaela help since she'll be her today but will take to number to [facility]. CC gave scheduling line [phone].     CHW Plan: CHW to follow up in 1 month.     Delilah Ramos  [facility] Care Coordination  [facility]'s, and [facility]    Phone: [phone]  ________________________    Next Outreach:  8/26/21  Planned Outreach Frequency: Monthly  Preferred Phone Number: [phone]    Enrollment Date:  4/6/20  Last Care Plan Assessment:  3/22/21

## 2021-07-29 NOTE — PROGRESS NOTES
Remote monitoring of Pulmonary Artery Pressures via CardioMEMS device reviewed at least weekly and as needed with CORE nursing. Diuretics adjusted accordingly for patients .      billing dates of 6/25-7/24    [place] [facility]

## 2021-07-29 NOTE — PROGRESS NOTES
[facility] CORE Clinic - CardioMEMS Reading Review    July 29, 2021   CardioMEMS reviewed.  Current diuretic: Bumex 4 mg daily  Recent plan of care changes: none. PAD in goal range.       Current Threshold parameters:       Today's Waveform:       Readings:           RHC Date Wedge Pressure MEMS PAD during cath  (average of the 3 values)     7/1/2019 w/MEMS implant     20 mmHg   18 mmHg           Rosina Mays RN

## 2021-08-04 NOTE — PROGRESS NOTES
[facility] CORE Clinic - CardioMEMS Reading Review    August 4, 2021   CardioMEMS reviewed.  Current diuretic: Bumex 4 mg daily  Recent plan of care changes: none. PAD in goal range.     Current Threshold parameters:       Today's Waveform:       Readings:           RHC Date Wedge Pressure MEMS PAD during cath  (average of the 3 values)     7/1/2019 w/MEMS implant     20 mmHg   18 mmHg           Rosina Mays RN

## 2021-08-09 ENCOUNTER — OFFICE VISIT (OUTPATIENT)
Dept: FAMILY MEDICINE | Facility: CLINIC | Age: 76
End: 2021-08-09
Payer: MEDICARE

## 2021-08-09 VITALS
SYSTOLIC BLOOD PRESSURE: 133 MMHG | DIASTOLIC BLOOD PRESSURE: 62 MMHG | HEART RATE: 62 BPM | OXYGEN SATURATION: 95 % | TEMPERATURE: 97.2 F

## 2021-08-09 DIAGNOSIS — R22.1 LUMP IN NECK: ICD-10-CM

## 2021-08-09 DIAGNOSIS — M25.512 PAIN IN JOINT OF LEFT SHOULDER: ICD-10-CM

## 2021-08-09 DIAGNOSIS — H92.02 LEFT EAR PAIN: ICD-10-CM

## 2021-08-09 PROCEDURE — 99214 OFFICE O/P EST MOD 30 MIN: CPT | Performed by: FAMILY MEDICINE

## 2021-08-09 RX ORDER — PREGABALIN 100 MG/1
100 CAPSULE ORAL 3 TIMES DAILY
Qty: 90 CAPSULE | Refills: 0 | Status: ON HOLD | OUTPATIENT
Start: 2021-08-09 | End: 2022-07-16

## 2021-08-09 NOTE — PROGRESS NOTES
Assessment & Plan     1. Pain in joint of left shoulder  - due to left ear pain increased lyrica from 100 mg BID to 100 mg TID, monitor for sedation  - pregabalin (LYRICA) 100 MG capsule; Take 1 capsule (100 mg) by mouth 3 times daily  Dispense: 90 capsule; Refill: 0    2. Lump in neck  - ordered US for further evaluation  - US Head Neck Soft Tissue; Future    3. Left ear pain  - likely due to biopsy   - see above      Noel Hernandes MD  [facility]    Breana Floyd is a 76 year old who presents for the following health issues     HPI       Establish care    Right neck - she has had a lump in her right neck for close to one year. Area is currently starting to become painful when she swallows. She has been experiencing night sweats over last few months since she got a new bed. No fever or changes in appetite.    Left ear - Two months ago she had a biopsy for temporal arteritis. She feels that area of the biopsy is still sore. She has dull pain on the front of the left ear. She notes some muffling of the left ear. Biopsy was done 6/4//21. No recent ear trauma.     Cough for six months. Cough interferes with her using her CPAP machine. Cough is dry and sometimes productive. No shortness of breath. She has dry mouth. Normal CXR 7/9/21. Sometimes she feels PND in the morning.     Review of Systems         Objective    There were no vitals taken for this visit.  There is no height or weight on file to calculate BMI.  Physical Exam

## 2021-08-09 NOTE — PATIENT INSTRUCTIONS
Increase Lyrica (pregablin) from 100 mg twice daily to 100 mg three times daily   Please call [phone] to schedule imaging (neck ultrasound)   Follow up in one month

## 2021-08-09 NOTE — PROGRESS NOTES
Outcome for 08/09/21 11:22 AM :Left Voicemail for patient to call back  Outcome for 08/10/21 9:16 AM :Patient is sharing data via clinic device website and patient has been instructed to update data on website. Find login information by using .[email or web address]     Mariel Ribeiro  is being evaluated via a billable video visit.      How would you like to obtain your AVS? Fanbouts  For the video visit, send the invitation by: [employer]  Will anyone else be joining your video visit? Odalis       Video: 11:02 - 11:30    [facility]  Endocrinology  Muriel Will PA-C  08/10/2021      Chief Complaint:   Diabetes    History of Present Illness:   Mariel Ribeiro is a 75 year old female with a history of type II diabetes mellitus, kidney failure, TIA, CHF, CAD status post CABG, COPD, thyroid cancer status post thyroidectomy and resultant hypothyroidism, and hypertension who presents for  follow up of her diabetes.     She was diagnosed with type 2 diabetes mellitus in 2007. Initially managed with oral Metformin, Januvia, and Glimepiride, all have been discontinued due to declining renal fucntion. She was reasonably well managed with NPH 28 units bid and Prandin with meals when last seen 10/31/2019.  I last saw her as a hospital follow-up in December 2019 with BG above goal and advised increasing Lantus to 31 and if needed 33 units daily and continuing prandin with meals and advised she see CDE.    Feb 2021: Met with Mariel and roommate Odalis.   Newer eye glasses not working, concerns about cognition and referred for neurology.  Concerned about renal disease, BG at goal.  Discussed starting Empagliflozin in place of Prandin but did not.      Interim: Saw nephrology in March and they felt risk of Empag given body habitus and recurrent yeast infections outweighs benefits.   Eye exam updated.  Concern for GCA and consider temporal artery biopsy.  Negative.  ER eval for chest pain in July.      Today:  Present with Odalis and tries to  take her Lantus at 1 pm every day but wonders if taking too late.    They have breakfast between 11 am and 3 pm and dinner 7 - 8 pm, sleep around 11pm.     Not very active,  Walking a block when can.  Has to be careful to avoid falls.      She has a big toe nail that is crooked and bites into next toe and has a bit of a scab there.  Toes bleed neither her or Odalis comfortable with this.  She also could use help with  Bath.  Was seeing DR. Connell.  Started seeing Noel Hernandes and they liked her a lot.     Current DM therapy:  Lantus 32 units daily  Prandin/ repaglinide 1 mg twice daily with each of her meals.   - Has not been taking this way, just taking twice daily.        Blood Glucose Monitoring:    Avg BG has risen from 122 but her limited hypoglycemia has resolved and GMI at goal for age without hypoglycemia.         Marked improvement BG at goal.      Diabetes monitoring and complications:  CAD: Yes  Last eye exam results: mild diabetic retinopathy (12/12/2018)   Microalbuminuria: 31.53 (5/22/2019)  Neuropathy: Yes  HTN: Yes  On Statin: Yes  On Aspirin: Yes  Depression: Yes  Erectile dysfunction: N/A      PMH: reviewed - any significant changes noted above.  Meds: Reviewed and updated in module and above.  Allergies: Reviewed and where applicable updated in module.  SH: Pertinent items reviewed with patient and changes noted above.  FH: Pertinent reviewed with patient and noted above.    ROS: Brief review of symptoms reveals stable condition except where noted above.         Social History:   She lives with her roommate Odalis and her dog Rimma. Odalis is an  and still working form home.  They eat between 8 and 10 pm.    Tobacco Use: former smoker, 27 years, quit 1989  Alcohol Use: none  Drug Use: none  PCP: Noel Hernandes      Physical Exam:   There were no vitals taken for this visit.     Wt Readings from Last 10 Encounters:   07/15/21 143.6 kg (316 lb 8 oz)   07/09/21 144.2 kg (318 lb)  "  07/07/21 144.2 kg (318 lb)   06/14/21 142 kg (313 lb)   03/02/21 141 kg (310 lb 13.6 oz)   02/10/21 142 kg (313 lb)   01/26/21 142 kg (313 lb)   01/12/21 142 kg (313 lb)   12/23/20 144.5 kg (318 lb 9.6 oz)   11/24/20 141.2 kg (311 lb 3.2 oz)      General: Pleasant, well nourished and hydrated obese female in NAD.  Accompanied by Odalis/    Psych:  Mood is \"good,\" affect is appropriate.  Thought form and content are fluid and coherent.    HEENT: Eyes and sclera are clear. Extraocular movements are grossly intact without proptosis.  Nares are patent, mucous membranes moist.  Neck: No masses or JVD are noted.    Resp: Easy and unlabored breathing.   Neuro: Alert and oriented, communicating clearly. She is able to recall her diabetic medications without difficulty and discuss her BG numbers in general, what her goals for them each day are.    Ext: no swelling or edema     Data:  Lab Results   Component Value Date     07/15/2021    POTASSIUM 4.4 07/15/2021    CHLORIDE 111 (H) 07/15/2021    CO2 27 07/15/2021    ANIONGAP 2 (L) 07/15/2021     (H) 07/15/2021    BUN 30 07/15/2021    CR 1.34 (H) 07/15/2021    ANTOINETTE 9.9 07/15/2021     Lab Results   Component Value Date    GFRESTIMATED 39 (L) 07/15/2021    GFRESTIMATED 40 (L) 07/09/2021    GFRESTIMATED 33 (L) 04/29/2021    GFRESTBLACK 46 (L) 07/09/2021    GFRESTBLACK 39 (L) 04/29/2021    GFRESTBLACK 44 (L) 03/18/2021      Lab Results   Component Value Date    MICROL 39 03/02/2021    UMALCR 147.35 (H) 03/02/2021        Lab Results   Component Value Date    A1C 6.8 (H) 04/29/2021    A1C 6.9 (H) 06/24/2020    A1C 6.5 (H) 04/30/2019    A1C 5.2 09/10/2018    A1C 9.3 (H) 07/01/2018    HEMOGLOBINA1 7.2 (A) 10/31/2019    HEMOGLOBINA1 6.5 (A) 04/03/2019    HEMOGLOBINA1 5.5 11/29/2018     No results found for: CPEPT, GADAB, ISCAB    Lab Results   Component Value Date    CHOL 212 (H) 04/29/2021    CHOL 170 03/13/2019    TRIG 191 (H) 04/29/2021    TRIG 118 03/13/2019    HDL 57 " 04/29/2021    HDL 71 03/13/2019     (H) 04/29/2021    LDL 76 03/13/2019    NHDL 155 (H) 04/29/2021    NHDL 99 03/13/2019     TSH   Date Value Ref Range Status   04/29/2021 7.12 (H) 0.40 - 4.00 mU/L Final       Assessment and Plan:  Type 2 diabetes mellitus with stage 3 chronic kidney disease, with long-term current use of insulin (H)    Mariel is a 76 year old female with type II diabetes complicated by TIA, CHF, CAD status post CABG, COPD, thyroid cancer status post thyroidectomy and resultant hypothyroidism, obesity, depression, chronic pain and hypertension.    Her diabetes is well -controlled with diet, very limited physical activity, Lantus 32 units daily, and Prandin 1 mg twice daily with each meal.    Continue same but take Prandin BEFORE meals and Lantus SAME TIME every day.          Obesity:  Weight is coming down with her two daily meals, care with snacking diet and physical activity.  Heartily encouraged in this.    Advised try [employer] with Sabrina for seated balance and posture exercises.      HTN/CKD:  stable stage 3, with GFR stable Cr stable.  Has established care with nephrology.          DM Complications-     Reviewed lifestyle modification focusing on reduction of saturated fat and trans fat; increase of dietary n-3 fatty acids, viscous fiber, and plant stanols/sterols intake; and increased physical activity recommended to improve the lipid profile and reduce the risk of developing atherosclerotic cardiovascular disease.     Retinopathy:  up to date exams  Nephropathy:  Yes, stable CKD 3.  Seeing   Neuropathy: Yes peripheral neuropathy.  Referred to podiatry and advised discuss visiting nurse for nail care with podiatry , PCP.    Feet: As above. Small lesion referred to PCP.    Lipids: Has CAD and is taking a statin.     Hypothyroidism:  Possibly insufficient.  It appears your thyroid dose may not have been sufficient when last checked by DR Connell.  Please be sure to take each  morning 30 minutes before any food and have this level rechecked.       Follow-up: Return in 3-6 months   53minutes in preparation for visit reviewing chart, labs and documentation, visiting with patient gathering history and in exam, education and counseling, as well as coordination of care, further chart review and documentation following visit on this date of service and as alluded to documented above.        It is my privilege to be involved in the care of the above patient.     Muriel Will PA-C, MPAS  [facility]  Diabetes, Endocrinology, and Metabolism  [phone] Appointments/Nurse  [phone] Fax  [phone] pager  [phone] nurse line

## 2021-08-10 ENCOUNTER — VIRTUAL VISIT (OUTPATIENT)
Dept: ENDOCRINOLOGY | Facility: CLINIC | Age: 76
End: 2021-08-10
Payer: MEDICARE

## 2021-08-10 DIAGNOSIS — E78.5 HYPERLIPIDEMIA WITH TARGET LDL LESS THAN 70: ICD-10-CM

## 2021-08-10 DIAGNOSIS — N18.32 TYPE 2 DIABETES MELLITUS WITH STAGE 3B CHRONIC KIDNEY DISEASE, WITH LONG-TERM CURRENT USE OF INSULIN (H): Primary | ICD-10-CM

## 2021-08-10 DIAGNOSIS — E11.22 TYPE 2 DIABETES MELLITUS WITH STAGE 3B CHRONIC KIDNEY DISEASE, WITH LONG-TERM CURRENT USE OF INSULIN (H): Primary | ICD-10-CM

## 2021-08-10 DIAGNOSIS — Z79.4 TYPE 2 DIABETES MELLITUS WITH STAGE 3B CHRONIC KIDNEY DISEASE, WITH LONG-TERM CURRENT USE OF INSULIN (H): Primary | ICD-10-CM

## 2021-08-10 PROCEDURE — 99215 OFFICE O/P EST HI 40 MIN: CPT | Mod: 95 | Performed by: PHYSICIAN ASSISTANT

## 2021-08-10 NOTE — PATIENT INSTRUCTIONS
Dear Mariel,  It is good to visit with you.  Please take:  repaglinide BEFORE each of your meals.  Lantus 32 units AT THE SAME TIME EACH DAY.    Please schedule with podiatry and discuss and visiting home nurse referral with your primary care provider to assist with nail care at home and possibly with showers.    Please try [employer] with Sabrina for seated balance and posture exercises or other videos that you find helpful for you.    Call with any BG >300 or <70.  To ER any BG >300 if you are feeling ill or it is not coming down.    It appears your thyroid dose may not have been sufficient when last checked by DR Connell.  Please be sure to take each morning 30 minutes before any food and have this level rechecked.      My best wishes,    Muriel Will PA-C, MPAS  [facility]  Diabetes, Endocrinology, and Metabolism  [phone] Appointments/Nurse  [phone] Fax  [phone] URGENTafter hours/weekend Endocrinologist on call

## 2021-08-10 NOTE — LETTER
8/10/2021       RE: Mariel Ribeiro  [address] [place] [address]     Dear Colleague,    Thank you for referring your patient, Mariel Ribeiro, to the [facility]CR[facility] [place] at [facility]. Please see a copy of my visit note below.    Outcome for 08/09/21 11:22 AM :Left Voicemail for patient to call back  Outcome for 08/10/21 9:16 AM :Patient is sharing data via clinic device website and patient has been instructed to update data on website. Find login information by using .Personalise     Mariel Ribeiro  is being evaluated via a billable video visit.      How would you like to obtain your AVS? Remote Assistant  For the video visit, send the invitation by: Profyleramonita  Will anyone else be joining your video visit? Odalis       Video: 11:02 - 11:30    [facility]  Endocrinology  Muriel Will PA-C  08/10/2021      Chief Complaint:   Diabetes    History of Present Illness:   Mariel Ribeiro is a 75 year old female with a history of type II diabetes mellitus, kidney failure, TIA, CHF, CAD status post CABG, COPD, thyroid cancer status post thyroidectomy and resultant hypothyroidism, and hypertension who presents for  follow up of her diabetes.     She was diagnosed with type 2 diabetes mellitus in 2007. Initially managed with oral Metformin, Januvia, and Glimepiride, all have been discontinued due to declining renal fucntion. She was reasonably well managed with NPH 28 units bid and Prandin with meals when last seen 10/31/2019.  I last saw her as a hospital follow-up in December 2019 with BG above goal and advised increasing Lantus to 31 and if needed 33 units daily and continuing prandin with meals and advised she see CDE.    Feb 2021: Met with Mariel and roommate Odalis.   Newer eye glasses not working, concerns about cognition and referred for neurology.  Concerned about renal disease, BG at goal.  Discussed starting Empagliflozin in place of  Prandin but did not.      Interim: Saw nephrology in March and they felt risk of Empag given body habitus and recurrent yeast infections outweighs benefits.   Eye exam updated.  Concern for GCA and consider temporal artery biopsy.  Negative.  ER kd for chest pain in July.      Today:  Present with Odalis and tries to take her Lantus at 1 pm every day but wonders if taking too late.    They have breakfast between 11 am and 3 pm and dinner 7 - 8 pm, sleep around 11pm.     Not very active,  Walking a block when can.  Has to be careful to avoid falls.      She has a big toe nail that is crooked and bites into next toe and has a bit of a scab there.  Toes bleed neither her or Odalis comfortable with this.  She also could use help with  Bath.  Was seeing DR. Connell.  Started seeing Noel Hernandes and they liked her a lot.     Current DM therapy:  Lantus 32 units daily  Prandin/ repaglinide 1 mg twice daily with each of her meals.   - Has not been taking this way, just taking twice daily.        Blood Glucose Monitoring:    Avg BG has risen from 122 but her limited hypoglycemia has resolved and GMI at goal for age without hypoglycemia.         Marked improvement BG at goal.      Diabetes monitoring and complications:  CAD: Yes  Last eye exam results: mild diabetic retinopathy (12/12/2018)   Microalbuminuria: 31.53 (5/22/2019)  Neuropathy: Yes  HTN: Yes  On Statin: Yes  On Aspirin: Yes  Depression: Yes  Erectile dysfunction: N/A      PMH: reviewed - any significant changes noted above.  Meds: Reviewed and updated in module and above.  Allergies: Reviewed and where applicable updated in module.  SH: Pertinent items reviewed with patient and changes noted above.  FH: Pertinent reviewed with patient and noted above.    ROS: Brief review of symptoms reveals stable condition except where noted above.         Social History:   She lives with her roommate Odalis and her dog Rimma. Odalis is an  and still working form home.   "They eat between 8 and 10 pm.    Tobacco Use: former smoker, 27 years, quit 1989  Alcohol Use: none  Drug Use: none  PCP: Noel Hernandes      Physical Exam:   There were no vitals taken for this visit.     Wt Readings from Last 10 Encounters:   07/15/21 143.6 kg (316 lb 8 oz)   07/09/21 144.2 kg (318 lb)   07/07/21 144.2 kg (318 lb)   06/14/21 142 kg (313 lb)   03/02/21 141 kg (310 lb 13.6 oz)   02/10/21 142 kg (313 lb)   01/26/21 142 kg (313 lb)   01/12/21 142 kg (313 lb)   12/23/20 144.5 kg (318 lb 9.6 oz)   11/24/20 141.2 kg (311 lb 3.2 oz)      General: Pleasant, well nourished and hydrated obese female in NAD.  Accompanied by Odalis/    Psych:  Mood is \"good,\" affect is appropriate.  Thought form and content are fluid and coherent.    HEENT: Eyes and sclera are clear. Extraocular movements are grossly intact without proptosis.  Nares are patent, mucous membranes moist.  Neck: No masses or JVD are noted.    Resp: Easy and unlabored breathing.   Neuro: Alert and oriented, communicating clearly. She is able to recall her diabetic medications without difficulty and discuss her BG numbers in general, what her goals for them each day are.    Ext: no swelling or edema     Data:  Lab Results   Component Value Date     07/15/2021    POTASSIUM 4.4 07/15/2021    CHLORIDE 111 (H) 07/15/2021    CO2 27 07/15/2021    ANIONGAP 2 (L) 07/15/2021     (H) 07/15/2021    BUN 30 07/15/2021    CR 1.34 (H) 07/15/2021    ANTOINETTE 9.9 07/15/2021     Lab Results   Component Value Date    GFRESTIMATED 39 (L) 07/15/2021    GFRESTIMATED 40 (L) 07/09/2021    GFRESTIMATED 33 (L) 04/29/2021    GFRESTBLACK 46 (L) 07/09/2021    GFRESTBLACK 39 (L) 04/29/2021    GFRESTBLACK 44 (L) 03/18/2021      Lab Results   Component Value Date    MICROL 39 03/02/2021    UMALCR 147.35 (H) 03/02/2021        Lab Results   Component Value Date    A1C 6.8 (H) 04/29/2021    A1C 6.9 (H) 06/24/2020    A1C 6.5 (H) 04/30/2019    A1C 5.2 09/10/2018    A1C 9.3 " (H) 07/01/2018    HEMOGLOBINA1 7.2 (A) 10/31/2019    HEMOGLOBINA1 6.5 (A) 04/03/2019    HEMOGLOBINA1 5.5 11/29/2018     No results found for: CPEPT, GADAB, ISCAB    Lab Results   Component Value Date    CHOL 212 (H) 04/29/2021    CHOL 170 03/13/2019    TRIG 191 (H) 04/29/2021    TRIG 118 03/13/2019    HDL 57 04/29/2021    HDL 71 03/13/2019     (H) 04/29/2021    LDL 76 03/13/2019    NHDL 155 (H) 04/29/2021    NHDL 99 03/13/2019     TSH   Date Value Ref Range Status   04/29/2021 7.12 (H) 0.40 - 4.00 mU/L Final       Assessment and Plan:  Type 2 diabetes mellitus with stage 3 chronic kidney disease, with long-term current use of insulin (H)    Mariel is a 76 year old female with type II diabetes complicated by TIA, CHF, CAD status post CABG, COPD, thyroid cancer status post thyroidectomy and resultant hypothyroidism, obesity, depression, chronic pain and hypertension.    Her diabetes is well -controlled with diet, very limited physical activity, Lantus 32 units daily, and Prandin 1 mg twice daily with each meal.    Continue same but take Prandin BEFORE meals and Lantus SAME TIME every day.          Obesity:  Weight is coming down with her two daily meals, care with snacking diet and physical activity.  Heartily encouraged in this.    Advised try [employer] with Sabrina for seated balance and posture exercises.      HTN/CKD:  stable stage 3, with GFR stable Cr stable.  Has established care with nephrology.          DM Complications-     Reviewed lifestyle modification focusing on reduction of saturated fat and trans fat; increase of dietary n-3 fatty acids, viscous fiber, and plant stanols/sterols intake; and increased physical activity recommended to improve the lipid profile and reduce the risk of developing atherosclerotic cardiovascular disease.     Retinopathy:  up to date exams  Nephropathy:  Yes, stable CKD 3.  Seeing   Neuropathy: Yes peripheral neuropathy.  Referred to podiatry and advised discuss  visiting nurse for nail care with podiatry , PCP.    Feet: As above. Small lesion referred to PCP.    Lipids: Has CAD and is taking a statin.     Hypothyroidism:  Possibly insufficient.  It appears your thyroid dose may not have been sufficient when last checked by DR Connell.  Please be sure to take each morning 30 minutes before any food and have this level rechecked.       Follow-up: Return in 3-6 months   53minutes in preparation for visit reviewing chart, labs and documentation, visiting with patient gathering history and in exam, education and counseling, as well as coordination of care, further chart review and documentation following visit on this date of service and as alluded to documented above.        It is my privilege to be involved in the care of the above patient.     Muriel Will PA-C, MPAS  [facility]  Diabetes, Endocrinology, and Metabolism  [phone] Appointments/Nurse  [phone] Fax  [phone] pager  [phone] nurse line

## 2021-08-12 NOTE — PROGRESS NOTES
[facility] CORE Clinic - CardioMEMS Reading Review    August 12, 2021   CardioMEMS reviewed.  Current diuretic: Bumex 4 mg daily  Recent plan of care changes: none. PAD in goal range.     Current Threshold parameters:       Today's Waveform:       Readings:           RHC Date Wedge Pressure MEMS PAD during cath  (average of the 3 values)     7/1/2019 w/MEMS implant     20 mmHg   18 mmHg           Rosina Mays RN

## 2021-08-13 ENCOUNTER — ANCILLARY PROCEDURE (OUTPATIENT)
Dept: ULTRASOUND IMAGING | Facility: CLINIC | Age: 76
End: 2021-08-13
Attending: FAMILY MEDICINE
Payer: MEDICARE

## 2021-08-13 DIAGNOSIS — R22.1 LUMP IN NECK: ICD-10-CM

## 2021-08-13 DIAGNOSIS — H92.02 LEFT EAR PAIN: ICD-10-CM

## 2021-08-13 PROCEDURE — 76536 US EXAM OF HEAD AND NECK: CPT | Performed by: RADIOLOGY

## 2021-08-16 ENCOUNTER — ALLIED HEALTH/NURSE VISIT (OUTPATIENT)
Dept: OTHER | Facility: CLINIC | Age: 76
End: 2021-08-16
Payer: MEDICARE

## 2021-08-16 VITALS — OXYGEN SATURATION: 95 % | HEART RATE: 72 BPM | DIASTOLIC BLOOD PRESSURE: 62 MMHG | SYSTOLIC BLOOD PRESSURE: 124 MMHG

## 2021-08-16 DIAGNOSIS — Z79.4 TYPE 2 DIABETES MELLITUS WITHOUT COMPLICATION, WITH LONG-TERM CURRENT USE OF INSULIN (H): Primary | ICD-10-CM

## 2021-08-16 DIAGNOSIS — E11.9 TYPE 2 DIABETES MELLITUS WITHOUT COMPLICATION, WITH LONG-TERM CURRENT USE OF INSULIN (H): Primary | ICD-10-CM

## 2021-08-16 PROCEDURE — 99207 PR COMMUNITY PARAMEDIC - PATIENT NOT BILLABLE: CPT

## 2021-08-16 NOTE — PROGRESS NOTES
Community Paramedics Follow-up Visit  August 16, 2021  TIME: 1430    Mariel Ribeiro is a 76 year old female being seen at home for a follow-up visit.    Present at appointment:           Chief Complaint   Patient presents with     Outreach     Community Paramedic visit       Universal Utilization:      Utilization    Hospital Admissions  0             ED Visits  1             No Show Count (past year)  2                Current as of: 8/15/2021  7:44 PM              There were no vitals taken for this visit.    Clinical Concerns:  Current Medical Concerns:  Headache    Current Behavioral Concerns: none identified.    Education Provided to patient: no   Medication set up? No  Pill Box issued: No  Scale issued: No  Flu Shot given: No  Lab draw or specimen collection: No  Food box issued: No  Collaborative visit with PCP: No    Health Maintenance Reviewed:      Clinical Pathway: None    No  Face to Face in Home / Community    Recent blood sugars: 151    Review of Symptoms/PE    Skin: negative  Eyes: visual blurring  Ears/Nose/Throat: negative  Respiratory: Dyspnea on exertion-   Cardiovascular: negative  Gastrointestinal: negative  Genitourinary: negative  Musculoskeletal: back pain and muscular weakness  Neurologic: headaches  Psychiatric: negative    Pain Management::                 Plan:     Time spent with patient: 45    The patient meets one or more of the following criteria:  * Requires services to prevent readmission to a nursing home or hospital    Acute concern/Follow-up recommendations: Continue care plan.    Next CP visit scheduled: 9/7/21    Issues for Provider to follow up on: The pt reports having a good visit with her PCP and had many of her concerns addressed.  Her chief complaint today is a headache by the biopsy site on the L side of her head.  The wound will still drain, no signs of infection observed.        Provider follow up visit needed: 9/9/21

## 2021-08-19 DIAGNOSIS — G89.29 OTHER CHRONIC PAIN: ICD-10-CM

## 2021-08-19 NOTE — PROGRESS NOTES
[facility] CORE Clinic - CardioMEMS Reading Review    August 19, 2021   CardioMEMS reviewed.  Current diuretic: Bumex 4 mg daily  Recent plan of care changes: none. PAD in goal range.     Current Threshold parameters:       Today's Waveform:       Readings:           RHC Date Wedge Pressure MEMS PAD during cath  (average of the 3 values)     7/1/2019 w/MEMS implant     20 mmHg   18 mmHg           Rosina Mays RN

## 2021-08-20 ENCOUNTER — TELEPHONE (OUTPATIENT)
Dept: FAMILY MEDICINE | Facility: CLINIC | Age: 76
End: 2021-08-20

## 2021-08-20 NOTE — TELEPHONE ENCOUNTER
RN, can you please inform pt that US showed no change in cyst from US 3/17/2107. Previous US showed that it was more consistent with epidermal cyst which is benign. Due to the changes pt is experiencing, does she want to have cyst removed?    DM

## 2021-08-22 ENCOUNTER — MYC MEDICAL ADVICE (OUTPATIENT)
Dept: FAMILY MEDICINE | Facility: CLINIC | Age: 76
End: 2021-08-22

## 2021-08-22 DIAGNOSIS — G89.29 OTHER CHRONIC PAIN: ICD-10-CM

## 2021-08-23 ENCOUNTER — PATIENT OUTREACH (OUTPATIENT)
Dept: CARE COORDINATION | Facility: CLINIC | Age: 76
End: 2021-08-23

## 2021-08-23 RX ORDER — TRAMADOL HYDROCHLORIDE 50 MG/1
TABLET ORAL
Qty: 60 TABLET | OUTPATIENT
Start: 2021-08-23

## 2021-08-23 ASSESSMENT — ACTIVITIES OF DAILY LIVING (ADL): DEPENDENT_IADLS:: SHOPPING

## 2021-08-23 NOTE — PROGRESS NOTES
Clinic Care Coordination Contact    Situation: Patient chart reviewed by care coordinator.    Background: Patient is due for RNCC chart review.     Assessment: Per chart review, patient continues to follow up with community paramedic and community health worker and monthly outreaches. Patient uses mychart to communicate wit her care team. All follow up appointments are scheduled.     Plan/Recommendations: CHW will continue monthly follow ups and RNCC will review chart every 6 weeks.     Peggy Raines RN Care Coordinator     [facility]  [facility]   [email or web address]    Office: [phone]

## 2021-08-23 NOTE — TELEPHONE ENCOUNTER
Duplicate request.  Marysol Silva RN    [facility]    Message sent to pharmacy - Refusal reason: Duplicate (WE ARE WORKING ON THE FIRST REQUEST FROM [employer] 8/19/21 FOR THIS MED).  Loraine VERA

## 2021-08-23 NOTE — TELEPHONE ENCOUNTER
Routing refill request to provider for review/approval because:  --Drug not on the [facility] refill protocol traMADol (ULTRAM) 50 MG tablet.    --Last order:   traMADol (ULTRAM) 50 MG tablet 60 tablet 0 6/10/2021  No   Sig: TAKE 1 TABLET(50 MG) BY MOUTH EVERY 6 HOURS AS NEEDED FOR BREAKTHROUGH PAIN OR SEVERE PAIN       --Last visit:  8/9/2021 Greta for pain.    --Future Visit:   Next 5 appointments (look out 90 days)    Sep 09, 2021  3:40 PM  SHORT with Noel Hernandes MD  [facility] ([facility] ) [address] [place] [address] [phone]   Sep 28, 2021 10:45 AM  (Arrive by 10:30 AM)  Return Visit with Opal Weber MD  [facility] ([facility] ) [address] [place] [address] [phone]

## 2021-08-24 RX ORDER — TRAMADOL HYDROCHLORIDE 50 MG/1
TABLET ORAL
Qty: 60 TABLET | Refills: 0 | Status: SHIPPED | OUTPATIENT
Start: 2021-08-24 | End: 2021-11-03

## 2021-08-24 NOTE — PROGRESS NOTES
Remote monitoring of Pulmonary Artery Pressures via CardioMEMS device reviewed at least weekly and as needed with CORE nursing. Diuretics adjusted accordingly.    billing dates of 7/25/21 through 8/23/21    [facility]

## 2021-08-26 DIAGNOSIS — F33.41 RECURRENT MAJOR DEPRESSIVE DISORDER, IN PARTIAL REMISSION (H): ICD-10-CM

## 2021-08-26 DIAGNOSIS — I25.10 ATHEROSCLEROSIS OF NATIVE CORONARY ARTERY WITHOUT ANGINA PECTORIS, UNSPECIFIED WHETHER NATIVE OR TRANSPLANTED HEART: ICD-10-CM

## 2021-08-30 RX ORDER — ESCITALOPRAM OXALATE 20 MG/1
TABLET ORAL
Qty: 90 TABLET | Refills: 0 | Status: SHIPPED | OUTPATIENT
Start: 2021-08-30 | End: 2022-01-04

## 2021-08-30 RX ORDER — ATORVASTATIN CALCIUM 40 MG/1
TABLET, FILM COATED ORAL
Qty: 90 TABLET | Refills: 0 | Status: SHIPPED | OUTPATIENT
Start: 2021-08-30 | End: 2022-01-17

## 2021-08-30 NOTE — TELEPHONE ENCOUNTER
Statins Protocol Shugyy7808/26/2021 08:47 AM   LDL on file in past 12 months Protocol Details    No abnormal creatine kinase in past 12 months     Recent (12 mo) or future (30 days) visit within the authorizing provider's specialty     Medication is active on med list     Patient is age 18 or older     No active pregnancy on record     No positive pregnancy test in past 12 months    SSRIs Protocol [id number]/26/2021 08:47 AM   PHQ-9 score less than 5 in past 6 months Protocol Details    Medication is active on med list     Patient is age 18 or older     No active pregnancy on record     No positive pregnancy test in last 12 months     Recent (6 mo) or future (30 days) visit within the authorizing provider's specialty

## 2021-08-31 ENCOUNTER — PATIENT OUTREACH (OUTPATIENT)
Dept: NURSING | Facility: CLINIC | Age: 76
End: 2021-08-31
Payer: MEDICARE

## 2021-08-31 ENCOUNTER — PATIENT OUTREACH (OUTPATIENT)
Dept: CARE COORDINATION | Facility: CLINIC | Age: 76
End: 2021-08-31

## 2021-08-31 NOTE — PROGRESS NOTES
Clinic Care Coordination Contact    Community Health Worker Follow Up  Spoke with patient and Odails (Roommate on PHI)    Care Gaps:     Health Maintenance Due   Topic Date Due     URINE DRUG SCREEN  Never done     COLORECTAL CANCER SCREENING  Never done     MEDICARE ANNUAL WELLNESS VISIT  08/10/2017     DIABETIC FOOT EXAM  03/19/2019     PHQ-9  04/09/2021     INFLUENZA VACCINE (1) 09/01/2021       CHW did to review care gaps and will review at next CC follow up.     Goals:   Goals Addressed as of 8/31/2021 at 10:51 AM                    Today    7/26/21       Monitoring (pt-stated)   30%  30%    Added 3/22/21 by Senia Durán, RN      Goal Statement: In the next three months, I will check my blood sugar, weight, blood pressure, pulse and oxygen level daily.   Date Goal set: 3/19/2021  Barriers: None identified   Strengths:Motivated   Date to Achieve By: 7/19/2021  Patient expressed understanding of goal: Yes    Action steps to achieve this goal:  1. I will call the clinic if my blood pressure is consistently over 140/90, my oxygen is consistently below 90%, my pulse is elevated or my blood sugar is in the abnormal range (Normal range ).  2. I will continue to focus on eating 3 healthy meals every day.  3. I will schedule and attend future specialty and primary care provider appointments.   4. I will give the CHW updates at outreach calls.    Updated: 4/12/21 8/31/21 CHW     Patient continues to work with CP. CHW did not review above with patient and mostly spoke with patients roommate Odalis.         Intervention and Education during outreach: CHW inquired if patient needs any additional support or resources. Odalis put call on speaker phone. Patient shares that she still has a cough and sore throat. Odalis states that they informed Dr. Hernandes of cough at patients last visit, her input was that she thought cough might be related to using inhaler and was going to get back to patient. Odalis states that they  received ultrasound results of lump on patients  neck and no concerns however they want to make sure of this with Dr. Hernandes.   CHW informed Odalis that will route encounter to PCP, to clarify her questions regarding cough and ultrasound. CHW encouraged calling CC if any support or resources are needed.    CHW Plan: CHW to follow up in 1 month. CHW to route encounter to PCP, to clarify questions regarding cough and ultrasound.    [facility] Coord[facility]  [facility], and [facility]    Phone: [phone]  ____________________    Next Outreach:  9/30/21  Planned Outreach Frequency: Monthly  Preferred Phone Number: [phone]    Enrollment Date:  4/6/20  Last Care Plan Assessment:  3/22/21

## 2021-09-06 ENCOUNTER — ALLIED HEALTH/NURSE VISIT (OUTPATIENT)
Dept: CARDIOLOGY | Facility: CLINIC | Age: 76
End: 2021-09-06
Attending: NURSE PRACTITIONER
Payer: MEDICARE

## 2021-09-06 DIAGNOSIS — I50.32 CHRONIC DIASTOLIC HEART FAILURE (H): Primary | ICD-10-CM

## 2021-09-06 PROCEDURE — 93264 REM MNTR WRLS P-ART PRS SNR: CPT | Performed by: NURSE PRACTITIONER

## 2021-09-07 ENCOUNTER — ALLIED HEALTH/NURSE VISIT (OUTPATIENT)
Dept: OTHER | Facility: CLINIC | Age: 76
End: 2021-09-07
Payer: MEDICARE

## 2021-09-07 VITALS
TEMPERATURE: 97.3 F | SYSTOLIC BLOOD PRESSURE: 110 MMHG | DIASTOLIC BLOOD PRESSURE: 58 MMHG | OXYGEN SATURATION: 96 % | HEART RATE: 55 BPM

## 2021-09-07 DIAGNOSIS — R53.83 OTHER FATIGUE: Primary | ICD-10-CM

## 2021-09-07 PROCEDURE — 99207 PR COMMUNITY PARAMEDIC - PATIENT NOT BILLABLE: CPT

## 2021-09-07 NOTE — PROGRESS NOTES
Community Paramedics Follow-up Visit  September 7, 2021  TIME: 1330    Mariel Ribeiro is a 76 year old female being seen at home for a follow-up visit.    Present at appointment:patient           Chief Complaint   Patient presents with     Outreach     Community Paramedic visit       Universal Utilization:      Utilization    Hospital Admissions  0             ED Visits  1             No Show Count (past year)  2                Current as of: 9/6/2021  4:27 PM              /58   Pulse 55   Temp 97.3  F (36.3  C)   SpO2 96%     Clinical Concerns:  Current Medical Concerns:  Cough, lethargy    Current Behavioral Concerns: none identified    Education Provided to patient: no   Medication set up? No  Pill Box issued: No  Scale issued: No  Flu Shot given: No  Lab draw or specimen collection: No  Food box issued: No  Collaborative visit with PCP: No    Health Maintenance Reviewed:      Clinical Pathway: None    No  Face to Face in Home / Community    Recent blood sugars: 159 non fasting    Review of Symptoms/PE    Skin: negative  Eyes: visual blurring  Ears/Nose/Throat: negative  Respiratory: Dyspnea on exertion-  and Cough- varies between dry and white foamy sputum.  Cardiovascular: negative  Gastrointestinal: negative  Genitourinary: negative  Musculoskeletal: back pain and neck pain  Neurologic: headaches, numbness or tingling of hands, numbness or tingling of feet and memory problems  Psychiatric: negative    Pain Management::                 Plan:     Time spent with patient: 60    The patient meets one or more of the following criteria:  * Requires services to prevent readmission to a nursing home or hospital    Acute concern/Follow-up recommendations: Continue care plan.    Next CP visit scheduled: 9/29/2     Issues for Provider to follow up on: Pt main concern at this time is a chronic cough which she does not understand why.  She reports it varies between a dry cough and a white sputum.  She said she  addressed this at her last PCP visit and I encouraged her to follow up with her at the next appointment on 9/9/21.      Provider follow up visit needed: 9/9/21

## 2021-09-08 NOTE — PROGRESS NOTES
[facility] CORE Clinic - CardioMEMS Reading Review    September 8, 2021   CardioMEMS reviewed.  Current diuretic: Bumex 4 mg daily  Recent plan of care changes: none. PAD in goal range.     Current Threshold parameters:       Today's Waveform:       Readings:           RHC Date Wedge Pressure MEMS PAD during cath  (average of the 3 values)     7/1/2019 w/MEMS implant     20 mmHg   18 mmHg           Rosnia Mays RN

## 2021-09-09 NOTE — TELEPHONE ENCOUNTER
Pt has not phoned back so Dr Hernandes's message sent to her in [employer]. She has initiated [employer] messages herself in the recent past indicating that she does read them.     This encounter will be closed    Candace Tello RN

## 2021-09-16 NOTE — PROGRESS NOTES
[facility] CORE Clinic - CardioMEMS Reading Review    September 16, 2021   CardioMEMS reviewed.  Current diuretic: Bumex 4 mg daily  Recent plan of care changes: none. PAD in goal range.     Current Threshold parameters:       Today's Waveform:       Readings:           RHC Date Wedge Pressure MEMS PAD during cath  (average of the 3 values)     7/1/2019 w/MEMS implant     20 mmHg   18 mmHg           Rosina Mays RN

## 2021-09-18 ENCOUNTER — MYC MEDICAL ADVICE (OUTPATIENT)
Dept: FAMILY MEDICINE | Facility: CLINIC | Age: 76
End: 2021-09-18

## 2021-09-18 DIAGNOSIS — J41.0 SIMPLE CHRONIC BRONCHITIS (H): ICD-10-CM

## 2021-09-20 DIAGNOSIS — I50.32 CHRONIC DIASTOLIC HEART FAILURE (H): Chronic | ICD-10-CM

## 2021-09-21 RX ORDER — ALBUTEROL SULFATE 90 UG/1
AEROSOL, METERED RESPIRATORY (INHALATION)
Qty: 25.5 G | Refills: 9 | Status: SHIPPED | OUTPATIENT
Start: 2021-09-21 | End: 2023-07-28

## 2021-09-21 RX ORDER — BUMETANIDE 1 MG/1
TABLET ORAL
Qty: 120 TABLET | Refills: 2 | Status: SHIPPED | OUTPATIENT
Start: 2021-09-21 | End: 2021-09-24

## 2021-09-21 NOTE — TELEPHONE ENCOUNTER
Refill encounter from 9/18 routed to provider for this medication refill request.     SONIA AlbertoN RN  [facility]

## 2021-09-21 NOTE — TELEPHONE ENCOUNTER
Routing refill request to provider for review/approval because:  --I prefer primary provider override the warning this time.            --Last visit: 8/9/21 Greta.      --Future Visit:   Next 5 appointments (look out 90 days)    Sep 28, 2021 10:45 AM  (Arrive by 10:30 AM)  Return Visit with Opal Weber MD  [facility] ([facility] ) [address] [place] [address] [phone]                Warnings Override History for albuterol (PROAIR HFA/PROVENTIL HFA/VENTOLIN HFA) 108 (90 Base) MCG/ACT inhaler [[id number]]    Overridden by Frank Horn MD on Jun 14, 2021 2:15 PM   Allergy/Contraindication   1. COMBIVENT [Level: Ingredient Match] [Reason: Tolerated medication/side effects in past]

## 2021-09-22 DIAGNOSIS — I50.32 CHRONIC DIASTOLIC HEART FAILURE (H): Chronic | ICD-10-CM

## 2021-09-23 NOTE — PROGRESS NOTES
[facility] CORE Clinic - CardioMEMS Reading Review    September 23, 2021   CardioMEMS reviewed.  Current diuretic: Bumex 4 mg daily  Recent plan of care changes: none. PAD in goal range.     Current Threshold parameters:       Today's Waveform:       Readings:           RHC Date Wedge Pressure MEMS PAD during cath  (average of the 3 values)     7/1/2019 w/MEMS implant     20 mmHg   18 mmHg           Rosina Mays RN

## 2021-09-24 RX ORDER — BUMETANIDE 1 MG/1
TABLET ORAL
Qty: 360 TABLET | Refills: 1 | Status: SHIPPED | OUTPATIENT
Start: 2021-09-24 | End: 2022-06-27

## 2021-09-24 NOTE — TELEPHONE ENCOUNTER
Refill approved. Needs follow up scheduled. Saw Yuni in July, was instructed to follow up with Dr Wolf in 3 months with kenn and Yuni in 6 months. Message sent to scheduling. Rosina Mays RN

## 2021-09-27 ENCOUNTER — PATIENT OUTREACH (OUTPATIENT)
Dept: CARE COORDINATION | Facility: CLINIC | Age: 76
End: 2021-09-27

## 2021-09-27 ASSESSMENT — ACTIVITIES OF DAILY LIVING (ADL): DEPENDENT_IADLS:: SHOPPING
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
## 2021-09-27 NOTE — PROGRESS NOTES
Clinic Care Coordination Contact    Situation: Patient chart reviewed by care coordinator.    Background: Patient enrolled in care coordination and is due for RN 6 week chart review.     Assessment: Per chart review, patient continues to follow up with [facility] monthly and is enrolled in the community paramedic program. She has all necessary appointments scheduled and is active using [employer] to communicate to her care team.     Plan/Recommendations: Writer will chart review in 6 weeks. [facility] will continue monthly outreaches.     Peggy Raines RN Care Coordinator     [facility]  [facility] and   [email or web address]    Office: [phone]

## 2021-09-28 ENCOUNTER — ANCILLARY PROCEDURE (OUTPATIENT)
Dept: MAMMOGRAPHY | Facility: CLINIC | Age: 76
End: 2021-09-28
Attending: INTERNAL MEDICINE
Payer: MEDICARE

## 2021-09-28 ENCOUNTER — TELEPHONE (OUTPATIENT)
Dept: CARDIOLOGY | Facility: CLINIC | Age: 76
End: 2021-09-28

## 2021-09-28 ENCOUNTER — ONCOLOGY VISIT (OUTPATIENT)
Dept: ONCOLOGY | Facility: CLINIC | Age: 76
End: 2021-09-28
Attending: INTERNAL MEDICINE
Payer: MEDICARE

## 2021-09-28 VITALS
TEMPERATURE: 97.2 F | OXYGEN SATURATION: 97 % | HEART RATE: 53 BPM | RESPIRATION RATE: 20 BRPM | DIASTOLIC BLOOD PRESSURE: 51 MMHG | SYSTOLIC BLOOD PRESSURE: 136 MMHG

## 2021-09-28 DIAGNOSIS — Z17.0 MALIGNANT NEOPLASM OF LOWER-INNER QUADRANT OF LEFT BREAST IN FEMALE, ESTROGEN RECEPTOR POSITIVE (H): Primary | ICD-10-CM

## 2021-09-28 DIAGNOSIS — E66.01 MORBID OBESITY (H): ICD-10-CM

## 2021-09-28 DIAGNOSIS — D05.12 DUCTAL CARCINOMA IN SITU (DCIS) OF LEFT BREAST: ICD-10-CM

## 2021-09-28 DIAGNOSIS — B37.2 CANDIDIASIS OF SKIN: ICD-10-CM

## 2021-09-28 DIAGNOSIS — L72.3 SEBACEOUS CYST: ICD-10-CM

## 2021-09-28 DIAGNOSIS — C50.312 MALIGNANT NEOPLASM OF LOWER-INNER QUADRANT OF LEFT BREAST IN FEMALE, ESTROGEN RECEPTOR POSITIVE (H): Primary | ICD-10-CM

## 2021-09-28 PROCEDURE — G0279 TOMOSYNTHESIS, MAMMO: HCPCS | Mod: GC | Performed by: RADIOLOGY

## 2021-09-28 PROCEDURE — G0463 HOSPITAL OUTPT CLINIC VISIT: HCPCS

## 2021-09-28 PROCEDURE — 99214 OFFICE O/P EST MOD 30 MIN: CPT | Performed by: INTERNAL MEDICINE

## 2021-09-28 PROCEDURE — 77066 DX MAMMO INCL CAD BI: CPT | Mod: GC | Performed by: RADIOLOGY

## 2021-09-28 RX ORDER — NYSTATIN 10B UNIT
1 POWDER (EA) MISCELLANEOUS 4 TIMES DAILY
Qty: 1 EACH | Refills: 1 | Status: SHIPPED | OUTPATIENT
Start: 2021-09-28 | End: 2023-08-17
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
## 2021-09-28 NOTE — TELEPHONE ENCOUNTER
"Pt needs to schedule a follow up in October with an echocardiogram and labs with dr. Wolf and a follow up with Magdalena Hall with labs in January.     FOLLOW UP REQUESTS HAS BEEN CANCELLED; can be found in the \"finalized requests\" tab of the pt's appointment desk. PLEASE REINSTATE REQUEST (right click on request and select \"reinstate\") AND LINK TO APPOINTMENT WHEN SCHEDULING.   "

## 2021-09-28 NOTE — NURSING NOTE
"Oncology Rooming Note    September 28, 2021 11:06 AM   Mariel Ribeiro is a 76 year old female who presents for:    Chief Complaint   Patient presents with     Oncology Clinic Visit     Return: Malignant neoplasm of lower-inner quadrant of left breast in female, estrogen receptor positive     Initial Vitals: /51 (BP Location: Right arm, Patient Position: Sitting, Cuff Size: Adult Large)   Pulse 53   Temp 97.2  F (36.2  C) (Tympanic)   Resp 20   SpO2 97%  Estimated body mass index is 49.57 kg/m  as calculated from the following:    Height as of 7/15/21: 1.702 m (5' 7\").    Weight as of 7/15/21: 143.6 kg (316 lb 8 oz). There is no height or weight on file to calculate BSA.  Data Unavailable Comment: Data Unavailable   No LMP recorded. Patient is postmenopausal.  Allergies reviewed: Yes  Medications reviewed: Yes    Medications: Medication refills not needed today.  Pharmacy name entered into [employer]:    [id number] - [place], [place] - [address] AT [facility] [address]  [id number] [place], [place] - [address] AT [facility] [address]    Clinical concerns: N/A   Meghan Burton CMA              "

## 2021-09-29 ENCOUNTER — ALLIED HEALTH/NURSE VISIT (OUTPATIENT)
Dept: OTHER | Facility: CLINIC | Age: 76
End: 2021-09-29
Payer: MEDICARE

## 2021-09-29 VITALS
SYSTOLIC BLOOD PRESSURE: 108 MMHG | HEART RATE: 60 BPM | WEIGHT: 293 LBS | OXYGEN SATURATION: 94 % | TEMPERATURE: 97.2 F | DIASTOLIC BLOOD PRESSURE: 70 MMHG | BODY MASS INDEX: 49.96 KG/M2

## 2021-09-29 DIAGNOSIS — R41.0 CONFUSION: Primary | ICD-10-CM

## 2021-09-29 PROCEDURE — 99207 PR COMMUNITY PARAMEDIC - PATIENT NOT BILLABLE: CPT

## 2021-09-29 NOTE — PROGRESS NOTES
Community Paramedics Follow-up Visit  September 29, 2021  TIME: 1230    Mariel Ribeiro is a 76 year old female being seen at home for a follow-up visit.    Present at appointment:           Chief Complaint   Patient presents with     Outreach     Community Paramedic visit       Universal Utilization:      Utilization    Hospital Admissions  0             ED Visits  1             No Show Count (past year)  2                Current as of: 9/29/2021 12:54 PM              /70   Pulse 60   Temp 97.2  F (36.2  C)   SpO2 94%     Clinical Concerns:  Current Medical Concerns:  Headaches    Current Behavioral Concerns: none    Education Provided to patient: no   Medication set up? No  Pill Box issued: No  Scale issued: No  Flu Shot given: No  Lab draw or specimen collection: No  Food box issued: No  Collaborative visit with PCP: No    Health Maintenance Reviewed:      Clinical Pathway: None    No  Face to Face in Home / Community    Recent blood sugars: 141@1311 fasting    Review of Symptoms/PE    Skin: negative  Eyes: negative  Ears/Nose/Throat: negative  Respiratory: Dyspnea on exertion-   Cardiovascular: lower extremity edema  Gastrointestinal: negative  Genitourinary: negative  Musculoskeletal: back pain and neck pain  Neurologic: migraine headaches, numbness or tingling of hands and numbness or tingling of feet  Psychiatric: negative    Pain Management::                 Plan:     Time spent with patient: 60    The patient meets one or more of the following criteria:  * Requires services to prevent readmission to a nursing home or hospital    Acute concern/Follow-up recommendations: Continue care plan    Next CP visit scheduled: 9/28/21    Issues for Provider to follow up on: Mariel chief complaint at this time is migraine headaches.  They have been ongoing and she was advised to follow up with her PCP and neurology, which she verbalized understanding.    Mariel is due for a flu shot and would be grateful if able  to do so in the home.  The Community Paramedic will reach out to her PCP to see if appropriate.    Provider follow up visit needed: IJEOMA

## 2021-09-29 NOTE — PROGRESS NOTES
[facility] - CardioMEMS Reading Review    September 29, 2021   CardioMEMS reviewed.  Current diuretic: Bumex 4 mg daily  Recent plan of care changes: none. PAD in goal range.     Current Threshold parameters:       Today's Waveform:       Readings:           RHC Date Wedge Pressure MEMS PAD during cath  (average of the 3 values)     7/1/2019 w/MEMS implant     20 mmHg   18 mmHg           Rosina Myas RN

## 2021-10-05 DIAGNOSIS — Z23 NEED FOR VACCINATION: Primary | ICD-10-CM

## 2021-10-06 ENCOUNTER — ALLIED HEALTH/NURSE VISIT (OUTPATIENT)
Dept: OTHER | Facility: CLINIC | Age: 76
End: 2021-10-06
Payer: MEDICARE

## 2021-10-06 DIAGNOSIS — M54.50 LOWER BACK PAIN: Primary | ICD-10-CM

## 2021-10-06 PROCEDURE — 90662 IIV NO PRSV INCREASED AG IM: CPT

## 2021-10-06 PROCEDURE — G0008 ADMIN INFLUENZA VIRUS VAC: HCPCS

## 2021-10-06 PROCEDURE — 99207 PR COMMUNITY PARAMEDIC - PATIENT NOT BILLABLE: CPT

## 2021-10-06 NOTE — PROGRESS NOTES
Community Paramedics Follow-up Visit  October 6, 2021  TIME: 1000    Mariel Ribeiro is a 76 year old female being seen at home for a follow-up visit.    Present at appointment:           Chief Complaint   Patient presents with     Flu Shot       Universal Utilization:      Utilization    Hospital Admissions  0             ED Visits  1             No Show Count (past year)  2                Current as of: 10/6/2021  7:39 AM              There were no vitals taken for this visit.    Clinical Concerns:  Current Medical Concerns:  Need for flu shot    Current Behavioral Concerns: none    Education Provided to patient: no   Medication set up? No  Pill Box issued: No  Scale issued: No  Flu Shot given: Yes  Lab draw or specimen collection: No  Food box issued: No  Collaborative visit with PCP: No    Health Maintenance Reviewed:      Clinical Pathway: None    No  Face to Face in Clinic  In home      Review of Symptoms/PE    Skin: negative  Eyes: negative  Ears/Nose/Throat: negative  Respiratory: No shortness of breath, dyspnea on exertion, cough, or hemoptysis  Cardiovascular: negative  Gastrointestinal: negative  Genitourinary: negative  Musculoskeletal: negative  Neurologic: negative  Psychiatric: negative    Pain Management::                 Plan:     Time spent with patient: 30    The patient meets one or more of the following criteria:  * Requires services to prevent readmission to a nursing home or hospital    Acute concern/Follow-up recommendations: Continue care plan    Next CP visit scheduled: TBD    Issues for Provider to follow up on: Flu shot was administered with out incident.    Provider follow up visit needed: TBD

## 2021-10-07 NOTE — PROGRESS NOTES
Remote monitoring of Pulmonary Artery Pressures via CardioMEMS device reviewed at least weekly and as needed with CORE nursing. Diuretics adjusted accordingly.      billing dates of 9/6 through 10/5 morena CORNEJO

## 2021-10-11 ENCOUNTER — PATIENT OUTREACH (OUTPATIENT)
Dept: CARE COORDINATION | Facility: CLINIC | Age: 76
End: 2021-10-11

## 2021-10-11 NOTE — PROGRESS NOTES
"Clinic Care Coordination Contact    Community Health Worker Follow Up  Spoke with patient and Odalis(Roommate)     Care Gaps:     Health Maintenance Due   Topic Date Due     URINE DRUG SCREEN  Never done     COLORECTAL CANCER SCREENING  Never done     MEDICARE ANNUAL WELLNESS VISIT  08/10/2017     DIABETIC FOOT EXAM  03/19/2019     PHQ-9  04/09/2021     A1C  10/29/2021     CHW reviewed care gaps with patient and roommate Odalis. Odalis will assist with appointments.    Goals:   Goals Addressed as of 10/19/2021 at 3:46 PM                    10/11/21    8/31/21       Monitoring (pt-stated)   30%  30%    Added 3/22/21 by Senia Durán, RN      Goal Statement: In the next three months, I will check my blood sugar, weight, blood pressure, pulse and oxygen level daily.   Date Goal set: 3/19/2021  Barriers: None identified   Strengths:Motivated   Date to Achieve By: 7/19/2021  Patient expressed understanding of goal: Yes    Action steps to achieve this goal:  1. I will call the clinic if my blood pressure is consistently over 140/90, my oxygen is consistently below 90%, my pulse is elevated or my blood sugar is in the abnormal range (Normal range ).  2. I will continue to focus on eating 3 healthy meals every day.  3. I will schedule and attend future specialty and primary care provider appointments.   4. I will give the CHW updates at outreach calls.    Updated: 4/12/21    10/19/21 CHW    CHW reviewed above goals with Odalis. Odalis wrote down BP, oxygen, and BS ranges in abvoe goal number 1. Odalis states, \"we're not going to call every sarah patients BS is over 130, I'll tell you that.\" CHW encouraged calling clinic with anymconcerns and notifying CP Rafaela at home visits. CHW encouraged calling CC for support as needed.     Odalis shares that they're mostly eating 2 good meals a day.       Intervention and Education during outreach:  CHW inquired if patient needs any additional support or resources. Patient shares that " Odalis is returning to work and she'll need someone to help her. Patient inquired about appointment for headaches and resources to help her in the home. Conference call made to scheduling,  CHW asked to speak with Odalis as patient would like her present for appointment. CHW spoke with Odalis, appointment scheduled with Dr. Hernandes. 11/15 340pm for headaches and cyst on back.     CHW inquired about Odalis's work schedule changing and resources that patient requested. Odalis states, she will still be working from home however it would be nice to connect patient with resources for companionship, assistance with bathing, meals delivery, and transportation. CHW informed Odalis that will reach out to Adelina Degroot [facility] for support and will follow up her.    CHW Plan: CHW to route encounter to [facility] for support with resources. CHW to follow up in 1 month or as needed per yonathan.     Delilah [facility] Care Coordination  [facility], and [facility]    Phone: [phone]  '___________________    Next Outreach:  11/19/21  Planned Outreach Frequency:  Monthly  Preferred Phone Number: [phone]    Enrollment Date:  4/6/20  Last Care Plan Assessment:  3/22/21

## 2021-10-13 ENCOUNTER — PATIENT OUTREACH (OUTPATIENT)
Dept: CARDIOLOGY | Facility: CLINIC | Age: 76
End: 2021-10-13

## 2021-10-13 NOTE — TELEPHONE ENCOUNTER
[facility] CORE Clinic - CardioMEMS Reading Review    October 13, 2021   CardioMEMS reviewed.  Current diuretic: Bumex 4 mg daily  Recent plan of care changes: No changes. PAD in goal range.     Current Threshold parameters:       Today's Waveform:       Readings:           RHC Date Wedge Pressure MEMS PAD during cath  (average of the 3 values)     7/1/2019 w/MEMS implant     20 mmHg   18 mmHg           Rosina Mays RN

## 2021-10-17 ENCOUNTER — MYC MEDICAL ADVICE (OUTPATIENT)
Dept: CARDIOLOGY | Facility: CLINIC | Age: 76
End: 2021-10-17

## 2021-10-17 DIAGNOSIS — I50.32 CHRONIC DIASTOLIC HEART FAILURE (H): Chronic | ICD-10-CM

## 2021-10-20 ENCOUNTER — PATIENT OUTREACH (OUTPATIENT)
Dept: CARE COORDINATION | Facility: CLINIC | Age: 76
End: 2021-10-20

## 2021-10-20 NOTE — PROGRESS NOTES
Clinic Care Coordination Contact  [facility]/Voicemail       Clinical Data: Care Coordinator Outreach  Outreach attempted x 1.  Left message on patient's voicemail with call back information and requested return call.  Plan:  Care Coordinator will try to reach patient again in 3-5 business days.    EUFEMIA Chin   [facility] Care Coordination  Tel: [phone]      Patient currently enrolled with RNCC. SWCC received note from CHW that patient is interested in social work help regarding resources for companionship, assistance with bathing, meals delivery, and transportation

## 2021-10-21 ENCOUNTER — PATIENT OUTREACH (OUTPATIENT)
Dept: CARDIOLOGY | Facility: CLINIC | Age: 76
End: 2021-10-21

## 2021-10-21 NOTE — TELEPHONE ENCOUNTER
[facility] CORE Clinic - CardioMEMS Reading Review    October 21, 2021   CardioMEMS reviewed.  Current diuretic: Bumex 4 mg daily  Recent plan of care changes: No changes. PAD in goal range.     Current Threshold parameters:       Today's Waveform:       Readings:           RHC Date Wedge Pressure MEMS PAD during cath  (average of the 3 values)     7/1/2019 w/MEMS implant     20 mmHg   18 mmHg           Rosina Mays RN

## 2021-10-26 NOTE — PROGRESS NOTES
Clinic Care Coordination Contact  [facility]/Voicemail       Clinical Data: Care Coordinator Outreach  Outreach attempted x 2.  Left message on patient's voicemail with call back information and requested return call.  Plan:  Care Coordinator will try to reach patient again in 10 business days.    Adelina Degroot [facility] Care Coordination  Tel: [phone]

## 2021-10-28 ENCOUNTER — PATIENT OUTREACH (OUTPATIENT)
Dept: CARDIOLOGY | Facility: CLINIC | Age: 76
End: 2021-10-28

## 2021-10-28 RX ORDER — METOPROLOL SUCCINATE 25 MG/1
TABLET, EXTENDED RELEASE ORAL
Qty: 135 TABLET | Refills: 3 | Status: SHIPPED | OUTPATIENT
Start: 2021-10-28 | End: 2023-02-27

## 2021-10-28 NOTE — TELEPHONE ENCOUNTER
Called Mariel as haven't heard back about Metoprolol dosage question she had sent Selleration about.     Current dose of Metoprolol we have prescribed is 25 mg (1 tab) in AM and 50 mg (2 tabs) in PM.     Mariel and Odalis confirm that she is currently taking 0.5 tab (12.5 mg) in AM and 1 tab (25 mg ) in PM.     Reports recent blood pressures last 3 days as follows:    137/56  133/65  131/52.  HRs have been 60-65.   Cardiomems have all been within her goal range in recent weeks. Will review with provider to see if should increase Toprol.     Also due for follow up with Dr Wolf and not scheduled - message sent to scheduling to schedule follow up.     Rosina Mays RN

## 2021-10-28 NOTE — TELEPHONE ENCOUNTER
[facility] CORE Clinic - CardioMEMS Reading Review    October 28, 2021   CardioMEMS reviewed.  Current diuretic: Bumex 4 mg daily  Recent plan of care changes: none. PAD in goal range.     Current Threshold parameters:       Today's Waveform:       Readings:           RHC Date Wedge Pressure MEMS PAD during cath  (average of the 3 values)     7/1/2019 w/MEMS implant     20 mmHg   18 mmHg           Rosina Mays RN

## 2021-10-28 NOTE — TELEPHONE ENCOUNTER
Reviewed with Yuni SCHAEFER. Should remain at dose she is taking with HRs in 60s. Should call us if she has increasing palpitations. Called to review this. Phone rang and no voicemail. Mychart sent. Rosina Mays RN   Date: 10/28/2021    Time of Call: 2:40 PM     Diagnosis:  HTN     [ VORB ] Ordering provider: Yuni SCHAEFER  Order: Metoprolol 12.5 mg in AM, 25 mg in PM     Order received by: Rosina Mays RN      Follow-up/additional notes:

## 2021-10-29 DIAGNOSIS — G89.29 OTHER CHRONIC PAIN: ICD-10-CM

## 2021-10-29 NOTE — TELEPHONE ENCOUNTER
Routing refill request to provider for review/approval because:  Drug not on the [facility] refill protocol     SONIA AlbertoN RN  [facility]

## 2021-11-02 ENCOUNTER — MYC MEDICAL ADVICE (OUTPATIENT)
Dept: FAMILY MEDICINE | Facility: CLINIC | Age: 76
End: 2021-11-02

## 2021-11-03 ENCOUNTER — PATIENT OUTREACH (OUTPATIENT)
Dept: CARDIOLOGY | Facility: CLINIC | Age: 76
End: 2021-11-03

## 2021-11-03 RX ORDER — TRAMADOL HYDROCHLORIDE 50 MG/1
TABLET ORAL
Qty: 60 TABLET | Refills: 0 | Status: SHIPPED | OUTPATIENT
Start: 2021-11-03 | End: 2022-01-13

## 2021-11-03 NOTE — TELEPHONE ENCOUNTER
[facility] CORE Clinic - CardioMEMS Reading Review    November 3, 2021   CardioMEMS reviewed.  Current diuretic: Bumex 4 mg daily  Recent plan of care changes: no changes. PAD in goal range.     Current Threshold parameters:       Today's Waveform:     Readings:           RHC Date Wedge Pressure MEMS PAD during cath  (average of the 3 values)     7/1/2019 w/MEMS implant     20 mmHg   18 mmHg           Rosina Mays RN

## 2021-11-04 ENCOUNTER — ALLIED HEALTH/NURSE VISIT (OUTPATIENT)
Dept: CARDIOLOGY | Facility: CLINIC | Age: 76
End: 2021-11-04
Attending: NURSE PRACTITIONER
Payer: MEDICARE

## 2021-11-04 DIAGNOSIS — I50.32 CHRONIC DIASTOLIC HEART FAILURE (H): Primary | ICD-10-CM

## 2021-11-04 PROCEDURE — 93264 REM MNTR WRLS P-ART PRS SNR: CPT | Performed by: NURSE PRACTITIONER

## 2021-11-04 NOTE — PROGRESS NOTES
Cardiomems monitoring for dates of 10/6 through 11/4. PAD goal range of 14-18 and has been within that range.

## 2021-11-05 ENCOUNTER — TELEPHONE (OUTPATIENT)
Dept: FAMILY MEDICINE | Facility: CLINIC | Age: 76
End: 2021-11-05
Payer: MEDICARE

## 2021-11-05 NOTE — TELEPHONE ENCOUNTER
This Rx for traMADol (ULTRAM) 50 MG tabletcannot be ordered.  Plan does not cover this med.  Please put in a new Rx for an alternative.

## 2021-11-05 NOTE — TELEPHONE ENCOUNTER
Central Prior Authorization Team   Phone: [phone]    PA Initiation    Medication: traMADol (ULTRAM) 50 MG tablet, rec 11-4-21  Insurance Company: [employer] Part D - Phone [phone] Fax [phone]  Pharmacy Filling the Rx: [id number] - [place], [place] - [address] AT [facility] [address]  Filling Pharmacy Phone: [phone]  Filling Pharmacy Fax:    Start Date: 11/5/2021

## 2021-11-07 DIAGNOSIS — I50.32 CHRONIC DIASTOLIC HEART FAILURE (H): Chronic | ICD-10-CM

## 2021-11-08 NOTE — TELEPHONE ENCOUNTER
Prior Authorization Not Needed per Insurance    Medication: traMADol (ULTRAM) 50 MG tablet, rec 11-4-21  Insurance Company: [employer] Part D - Phone [phone] Fax [phone]  Expected CoPay:      Pharmacy Filling the Rx: [id number] - [place], [place] - [address] AT [facility] [address]  Pharmacy Notified: Yes  Patient Notified: Yes      This is ready for  for patient for $1 copay.  Closing encounter.

## 2021-11-09 ENCOUNTER — PATIENT OUTREACH (OUTPATIENT)
Dept: CARE COORDINATION | Facility: CLINIC | Age: 76
End: 2021-11-09
Payer: MEDICARE

## 2021-11-09 RX ORDER — POTASSIUM CHLORIDE 750 MG/1
20 TABLET, EXTENDED RELEASE ORAL 2 TIMES DAILY
Qty: 120 TABLET | Refills: 6 | Status: SHIPPED | OUTPATIENT
Start: 2021-11-09 | End: 2022-05-10

## 2021-11-09 ASSESSMENT — ACTIVITIES OF DAILY LIVING (ADL): DEPENDENT_IADLS:: SHOPPING

## 2021-11-09 NOTE — PROGRESS NOTES
Remote monitoring of Pulmonary Artery Pressures via CardioMEMS device reviewed at least weekly and as needed with CORE nursing. Diuretics adjusted accordingly.      billing dates of 10/6 through 11/4    Austin CORNEJO NP-C

## 2021-11-09 NOTE — PROGRESS NOTES
Clinic Care Coordination Contact    Situation: Patient chart reviewed by care coordinator.    Background: Patient is enrolled in care coordination and is due for RN CC chart review.     Assessment: Per chart review, patient continues to update CHW at monthly outreaches. CHW asked Social work care coordinator to call patient regarding some resources and  has attempted 2 outreaches. Patient continues to have close cardiology monitoring.     Plan/Recommendations: Writer will preform chart review in 6 weeks. CHW to continue monthly outreaches.     Peggy Raines, JODY Care Coordinator     [facility] Ambulatory Care Management  [facility]

## 2021-11-09 NOTE — TELEPHONE ENCOUNTER
potassium chloride ER (KLOR-CON M) 10 MEQ CR tablet    Take 2 tablets (20 mEq) by mouth 2 times daily    Last Written Prescription Date:  10/10/20  Last Fill Quantity: 120,   # refills: 8  Last Office Visit : 7/15/21  Continue Kcl as she is taking.  Future Office visit:  none    Refill to pharmacy.   Lab Test 07/15/21  0948   POTASSIUM 4.4

## 2021-11-11 ENCOUNTER — PATIENT OUTREACH (OUTPATIENT)
Dept: NURSING | Facility: CLINIC | Age: 76
End: 2021-11-11
Payer: MEDICARE

## 2021-11-11 ENCOUNTER — PATIENT OUTREACH (OUTPATIENT)
Dept: CARDIOLOGY | Facility: CLINIC | Age: 76
End: 2021-11-11
Payer: MEDICARE

## 2021-11-11 NOTE — PROGRESS NOTES
Clinic Care Coordination Contact    Follow Up Progress Note      Assessment: SWCC called patient and her caregiver, Odalis. Spoke with them regarding patients need. They feel patient may need help with a bath. SWCC will send info over to patient for places that may be able to assist with this.    SWCC discussed with patient- services. They may be interested in this. SWCC explained the benefits of having an additional person in the home as a source of socialization.    Pt currently sees the community paramedic. Really enjoys the program. Had positive feedback.    Patient asked about a PCA- swcc explained this comes through the county. They shared they will start small- with perhaps just the bath aide.      Care Gaps:    Health Maintenance Due   Topic Date Due     URINE DRUG SCREEN  Never done     COLORECTAL CANCER SCREENING  Never done     MEDICARE ANNUAL WELLNESS VISIT  08/10/2017     DIABETIC FOOT EXAM  03/19/2019     PHQ-9  04/09/2021     MICROALBUMIN  06/16/2021     A1C  10/29/2021     HF ACTION PLAN  11/30/2021     HEMOGLOBIN  01/15/2022       Currently there are no Care Gaps.    Goals addressed this encounter:   Goals Addressed    None         Intervention/Education provided during outreach: CC used active listening and educated pt on county/insurance processes          Plan:   RN Care Coordinator will follow up in 1 month.    Adelina Degroot [facility] Care Coordination  Tel: [phone]

## 2021-11-11 NOTE — TELEPHONE ENCOUNTER
[facility] CORE Clinic - CardioMEMS Reading Review    November 11, 2021   CardioMEMS reviewed.  Current diuretic: Bumex 4 mg daily  Recent plan of care changes: none, PAD in goal range.     Current Threshold parameters:       Today's Waveform:       Readings:           RHC Date Wedge Pressure MEMS PAD during cath  (average of the 3 values)     7/1/2019 w/MEMS implant     20 mmHg   18 mmHg           Rosina Mays RN

## 2021-11-18 ENCOUNTER — PATIENT OUTREACH (OUTPATIENT)
Dept: CARDIOLOGY | Facility: CLINIC | Age: 76
End: 2021-11-18
Payer: MEDICARE

## 2021-11-18 NOTE — TELEPHONE ENCOUNTER
[facility] CORE Clinic - CardioMEMS Reading Review    November 18, 2021   CardioMEMS reviewed.  Current diuretic: Bumex 4 mg daily  Recent plan of care changes: none. PAD in goal range.     Current Threshold parameters:       Today's Waveform:       Readings:            RHC Date Wedge Pressure MEMS PAD during cath  (average of the 3 values)     7/1/2019 w/MEMS implant     20 mmHg   18 mmHg           Rosina Mays RN

## 2021-11-19 ENCOUNTER — PATIENT OUTREACH (OUTPATIENT)
Dept: CARE COORDINATION | Facility: CLINIC | Age: 76
End: 2021-11-19

## 2021-11-19 ENCOUNTER — APPOINTMENT (OUTPATIENT)
Dept: NURSING | Facility: CLINIC | Age: 76
End: 2021-11-19
Payer: MEDICARE

## 2021-11-19 NOTE — PROGRESS NOTES
Clinic Care Coordination Contact    Community Health Worker Follow Up  Spoke with patient and Odalis(Roommate)     Care Gaps:     Health Maintenance Due   Topic Date Due     URINE DRUG SCREEN  Never done     COLORECTAL CANCER SCREENING  Never done     MEDICARE ANNUAL WELLNESS VISIT  08/10/2017     DIABETIC FOOT EXAM  03/19/2019     PHQ-9  04/09/2021     MICROALBUMIN  06/16/2021     A1C  10/29/2021     HF ACTION PLAN  11/30/2021     HEMOGLOBIN  01/15/2022     CHW reviewed care gaps with patient and roommate Odalis. Odalis will assist with appointments. Addressed on 10/11/21, see encounter.     Goals:   Goals Addressed as of 11/19/2021 at 1:54 PM                    Today    10/11/21       Monitoring (pt-stated)   30%  30%    Added 3/22/21 by Senia Durán RN      Goal Statement: In the next three months, I will check my blood sugar, weight, blood pressure, pulse and oxygen level daily.   Date Goal set: 3/19/2021  Barriers: None identified   Strengths:Motivated   Date to Achieve By: 7/19/2021  Patient expressed understanding of goal: Yes    Action steps to achieve this goal:  1. I will call the clinic if my blood pressure is consistently over 140/90, my oxygen is consistently below 90%, my pulse is elevated or my blood sugar is in the abnormal range (Normal range ).  2. I will continue to focus on eating 3 healthy meals every day.  3. I will schedule and attend future specialty and primary care provider appointments.   4. I will give the CHW updates at outreach calls.    Updated: 4/12/21 11/19/21 CHW    CHW called patient, Odalis picked up and stated that patient is in the other room. Odalis states, she's not sure if CHW can help her but inquired about sooner appointment for patients headaches. Patient is scheduled 12/14 with Dr. Hernandes and there was nothing sooner.     CHW conference called scheduling, appointment scheduled 11/23rd at 10:40AM with Dr. Wolf. Odalis will keep patients 12/14th appointment with  Dr. Hernandes in case it's needed. CHW asked that Odalis stay on the line after scheduling appointment however Odalis states that whatever else patient needs they'll discuss at visit and ended call.  (Goal not reviewed)        Intervention and Education during outreach: CHW called patient, Odalis picked up and stated that patient is in the other room. Odalis states, she's not sure if CHW can help her but inquired about sooner appointment for patients headaches. Patient is scheduled 12/14 with Dr. Hernandes and there was nothing sooner.     CHW conference called scheduling, appointment scheduled 11/23rd at 10:40AM with Dr. Wolf. Odalis will keep patients 12/14th appointment with Dr. Hernandes in case it's needed. CHW asked that Odalis stay on the line after scheduling appointment however Odalis states that whatever else patient needs they'll discuss at visit and ended call.    CHW Plan: CHW to follow up in 1 month.     Delilah [facility] Care Coordination  [facility], and [facility]    Phone: [phone]  ___________________    Next Outreach:  12/19/21  Planned Outreach Frequency: Monthly  Preferred Phone Number: [phone]    Enrollment Date:  4/6/20  Last Care Plan Assessment:  3/22/21

## 2021-11-23 ENCOUNTER — PATIENT OUTREACH (OUTPATIENT)
Dept: CARDIOLOGY | Facility: CLINIC | Age: 76
End: 2021-11-23

## 2021-11-23 ENCOUNTER — OFFICE VISIT (OUTPATIENT)
Dept: FAMILY MEDICINE | Facility: CLINIC | Age: 76
End: 2021-11-23
Payer: MEDICARE

## 2021-11-23 VITALS
HEART RATE: 67 BPM | SYSTOLIC BLOOD PRESSURE: 192 MMHG | TEMPERATURE: 97.7 F | OXYGEN SATURATION: 95 % | DIASTOLIC BLOOD PRESSURE: 72 MMHG

## 2021-11-23 DIAGNOSIS — M54.41 BILATERAL LOW BACK PAIN WITH RIGHT-SIDED SCIATICA, UNSPECIFIED CHRONICITY: ICD-10-CM

## 2021-11-23 DIAGNOSIS — L56.8 PHOTOSENSITIVITY: ICD-10-CM

## 2021-11-23 DIAGNOSIS — I10 HYPERTENSION GOAL BP (BLOOD PRESSURE) < 130/80: Chronic | ICD-10-CM

## 2021-11-23 DIAGNOSIS — G89.4 CHRONIC PAIN SYNDROME: ICD-10-CM

## 2021-11-23 DIAGNOSIS — N18.30 TYPE 2 DIABETES MELLITUS WITH STAGE 3 CHRONIC KIDNEY DISEASE, WITH LONG-TERM CURRENT USE OF INSULIN, UNSPECIFIED WHETHER STAGE 3A OR 3B CKD (H): Primary | ICD-10-CM

## 2021-11-23 DIAGNOSIS — F33.41 RECURRENT MAJOR DEPRESSIVE DISORDER, IN PARTIAL REMISSION (H): Chronic | ICD-10-CM

## 2021-11-23 DIAGNOSIS — Z79.4 TYPE 2 DIABETES MELLITUS WITH STAGE 3 CHRONIC KIDNEY DISEASE, WITH LONG-TERM CURRENT USE OF INSULIN, UNSPECIFIED WHETHER STAGE 3A OR 3B CKD (H): Primary | ICD-10-CM

## 2021-11-23 DIAGNOSIS — E11.22 TYPE 2 DIABETES MELLITUS WITH STAGE 3 CHRONIC KIDNEY DISEASE, WITH LONG-TERM CURRENT USE OF INSULIN, UNSPECIFIED WHETHER STAGE 3A OR 3B CKD (H): Primary | ICD-10-CM

## 2021-11-23 DIAGNOSIS — R51.9 TEMPORAL PAIN: ICD-10-CM

## 2021-11-23 DIAGNOSIS — R22.9 LOCALIZED SUPERFICIAL SWELLING, MASS, OR LUMP: ICD-10-CM

## 2021-11-23 DIAGNOSIS — N18.4 CKD (CHRONIC KIDNEY DISEASE) STAGE 4, GFR 15-29 ML/MIN (H): ICD-10-CM

## 2021-11-23 DIAGNOSIS — R41.3 MEMORY LOSS: ICD-10-CM

## 2021-11-23 DIAGNOSIS — I77.9 BILATERAL CAROTID ARTERY DISEASE, UNSPECIFIED TYPE (H): ICD-10-CM

## 2021-11-23 LAB — HBA1C MFR BLD: 7 % (ref 0–5.6)

## 2021-11-23 PROCEDURE — 36415 COLL VENOUS BLD VENIPUNCTURE: CPT | Performed by: NURSE PRACTITIONER

## 2021-11-23 PROCEDURE — 83036 HEMOGLOBIN GLYCOSYLATED A1C: CPT | Performed by: NURSE PRACTITIONER

## 2021-11-23 PROCEDURE — 99417 PROLNG OP E/M EACH 15 MIN: CPT | Performed by: NURSE PRACTITIONER

## 2021-11-23 PROCEDURE — 99215 OFFICE O/P EST HI 40 MIN: CPT | Performed by: NURSE PRACTITIONER

## 2021-11-23 RX ORDER — NYSTATIN 100000 [USP'U]/G
1 POWDER TOPICAL
COMMUNITY
Start: 2021-10-08 | End: 2022-07-26

## 2021-11-23 ASSESSMENT — ANXIETY QUESTIONNAIRES
7. FEELING AFRAID AS IF SOMETHING AWFUL MIGHT HAPPEN: SEVERAL DAYS
6. BECOMING EASILY ANNOYED OR IRRITABLE: SEVERAL DAYS
2. NOT BEING ABLE TO STOP OR CONTROL WORRYING: SEVERAL DAYS
IF YOU CHECKED OFF ANY PROBLEMS ON THIS QUESTIONNAIRE, HOW DIFFICULT HAVE THESE PROBLEMS MADE IT FOR YOU TO DO YOUR WORK, TAKE CARE OF THINGS AT HOME, OR GET ALONG WITH OTHER PEOPLE: SOMEWHAT DIFFICULT
3. WORRYING TOO MUCH ABOUT DIFFERENT THINGS: SEVERAL DAYS
GAD7 TOTAL SCORE: 5
1. FEELING NERVOUS, ANXIOUS, OR ON EDGE: SEVERAL DAYS
5. BEING SO RESTLESS THAT IT IS HARD TO SIT STILL: NOT AT ALL

## 2021-11-23 ASSESSMENT — PATIENT HEALTH QUESTIONNAIRE - PHQ9: 5. POOR APPETITE OR OVEREATING: NOT AT ALL

## 2021-11-23 NOTE — PATIENT INSTRUCTIONS
-Neurology referral made for headaches  -neuro psych referral made for memory issues  -Pain clinic referral made  -Routine labs today  -Return to clinic in 1-2 months for routine preventative exam.

## 2021-11-23 NOTE — TELEPHONE ENCOUNTER
[facility] CORE Clinic - CardioMEMS Reading Review    November 23, 2021   CardioMEMS reviewed.  Current diuretic: Bumex 4 mg daily  Recent plan of care changes: none. PAD in goal range.     Current Threshold parameters:       Today's Waveform:       Readings:           RHC Date Wedge Pressure MEMS PAD during cath  (average of the 3 values)     7/1/2019 w/MEMS implant     20 mmHg   18 mmHg           Rosina Mays RN
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
## 2021-11-24 DIAGNOSIS — Z79.4 TYPE 2 DIABETES MELLITUS WITH STAGE 3A CHRONIC KIDNEY DISEASE, WITH LONG-TERM CURRENT USE OF INSULIN (H): ICD-10-CM

## 2021-11-24 DIAGNOSIS — E11.22 TYPE 2 DIABETES MELLITUS WITH STAGE 3A CHRONIC KIDNEY DISEASE, WITH LONG-TERM CURRENT USE OF INSULIN (H): ICD-10-CM

## 2021-11-24 DIAGNOSIS — N18.31 TYPE 2 DIABETES MELLITUS WITH STAGE 3A CHRONIC KIDNEY DISEASE, WITH LONG-TERM CURRENT USE OF INSULIN (H): ICD-10-CM

## 2021-11-24 ASSESSMENT — ANXIETY QUESTIONNAIRES: GAD7 TOTAL SCORE: 5

## 2021-11-24 ASSESSMENT — PATIENT HEALTH QUESTIONNAIRE - PHQ9: SUM OF ALL RESPONSES TO PHQ QUESTIONS 1-9: 6

## 2021-11-29 NOTE — TELEPHONE ENCOUNTER
Routing refill request to provider for review/approval because:  --No EHR protocol for Continuous Blood Gluc Sensor (FREESTYLE MARTY 14 DAY SENSOR) [facility].    --Last visit:  11/23/2021 chronic care Malika Wolf at [facility].

## 2021-12-01 DIAGNOSIS — M25.512 PAIN IN JOINT OF LEFT SHOULDER: ICD-10-CM

## 2021-12-01 DIAGNOSIS — E03.8 OTHER SPECIFIED HYPOTHYROIDISM: ICD-10-CM

## 2021-12-01 NOTE — TELEPHONE ENCOUNTER
Dr. Hernandes-Please review, sign if agree and may close encounter.    Routing refill request to provider for review/approval because:  Drug not on the [facility] refill protocol: Pregabalin    Last Written Prescription Date:  8/9/21  Last Fill Quantity: 90,  # refills: 0   Last office visit: 11/23/2021 with provider:  JULIANA Wolf CNP   Future Office Visit:  None    Thank you!  SONIA RobertsN, RN  [facility]

## 2021-12-01 NOTE — TELEPHONE ENCOUNTER
Dr. Hernandes-Please review and advise if you recommend patient return for thyroid lab check?    Routing refill request to provider for review/approval because:  Labs out of range:  TSH  TSH   Date Value Ref Range Status   04/29/2021 7.12 (H) 0.40 - 4.00 mU/L Final   10/01/2019 5.02 (H) 0.40 - 4.00 mU/L Final   08/28/2018 2.97 0.40 - 4.00 mU/L Final   03/19/2018 1.15 0.40 - 4.00 mU/L Final   11/15/2017 0.49 0.40 - 4.00 mU/L Final     T4 Free   Date Value Ref Range Status   04/29/2021 0.87 0.76 - 1.46 ng/dL Final   10/01/2019 0.97 0.76 - 1.46 ng/dL Final   08/23/2017 1.75 (H) 0.76 - 1.46 ng/dL Final   05/18/2017 1.36 0.76 - 1.46 ng/dL Final   03/08/2017 1.47 (H) 0.76 - 1.46 ng/dL Final     Thank you!  SONIA RobertsN, RN  [employer]

## 2021-12-02 ENCOUNTER — PATIENT OUTREACH (OUTPATIENT)
Dept: CARDIOLOGY | Facility: CLINIC | Age: 76
End: 2021-12-02
Payer: MEDICARE

## 2021-12-02 NOTE — TELEPHONE ENCOUNTER
[facility] CORE Clinic - CardioMEMS Reading Review    December 2, 2021   CardioMEMS reviewed.  Current diuretic: Bumex 4 mg daily  Recent plan of care changes: none. PAD in goal range.       Current Threshold parameters:       Today's Waveform:       Readings:           RHC Date Wedge Pressure MEMS PAD during cath  (average of the 3 values)     7/1/2019 w/MEMS implant     20 mmHg   18 mmHg           Rosina Mays RN

## 2021-12-04 ENCOUNTER — ALLIED HEALTH/NURSE VISIT (OUTPATIENT)
Dept: CARDIOLOGY | Facility: CLINIC | Age: 76
End: 2021-12-04
Attending: NURSE PRACTITIONER
Payer: MEDICARE

## 2021-12-04 DIAGNOSIS — I50.32 CHRONIC DIASTOLIC HEART FAILURE (H): Primary | ICD-10-CM

## 2021-12-04 PROCEDURE — 93264 REM MNTR WRLS P-ART PRS SNR: CPT | Performed by: NURSE PRACTITIONER

## 2021-12-06 ENCOUNTER — PATIENT OUTREACH (OUTPATIENT)
Dept: CARE COORDINATION | Facility: CLINIC | Age: 76
End: 2021-12-06
Payer: MEDICARE

## 2021-12-06 RX ORDER — FLASH GLUCOSE SENSOR
KIT MISCELLANEOUS
Qty: 2 EACH | Refills: 4 | Status: SHIPPED | OUTPATIENT
Start: 2021-12-06 | End: 2022-12-08

## 2021-12-06 RX ORDER — PREGABALIN 100 MG/1
CAPSULE ORAL
Qty: 180 CAPSULE | Refills: 1 | Status: SHIPPED | OUTPATIENT
Start: 2021-12-06 | End: 2022-05-31

## 2021-12-06 NOTE — PROGRESS NOTES
Cardiomems monitoring complete for billing period of 11/5 - 12/4. No changes to diuretics based on PAD this month. PAD has been overall within goal range of 14-18.

## 2021-12-06 NOTE — PROGRESS NOTES
Clinic Care Coordination Contact    Care Coordination Clinician Chart Review  Situation: Patient chart reviewed by care coordinator.       Background: Care Coordination initial assessment and enrollment to Care Coordination was successful.   Patient centered goals were developed with participation from patient.  MARCO CC handed patient off to CHW for continued outreach every 30 days.        Assessment: Per chart review, patient outreach completed by CC CHW on 11/19/21  Patient is actively working to accomplish goals.  Patient's goals remain appropriate and relevant at this time.   Patient is not yet due for updated Plan of Care.  Annual assessment will be due 10/22.      Goals        Monitoring (pt-stated)       Goal Statement: In the next three months, I will check my blood sugar, weight, blood pressure, pulse and oxygen level daily.   Date Goal set: 3/19/2021  Barriers: None identified   Strengths:Motivated   Date to Achieve By: 7/19/2021  Patient expressed understanding of goal: Yes    Action steps to achieve this goal:  1. I will call the clinic if my blood pressure is consistently over 140/90, my oxygen is consistently below 90%, my pulse is elevated or my blood sugar is in the abnormal range (Normal range ).  2. I will continue to focus on eating 3 healthy meals every day.  3. I will schedule and attend future specialty and primary care provider appointments.   4. I will give the CHW updates at outreach calls.    Updated: 4/12/21                Plan/Recommendations: The patient will continue working with Care Coordination to achieve goals as above.  CHW will involve MARCO SCHMID as needed or if patient is ready to move to maintenance.  MARCO CC will continue to monitor progress to goals and CHW outreaches every 6 weeks.   Plan of Care updated and mailed to patient: EUFEMIA Reagan   [facility] Care Coordination  Tel: [phone]

## 2021-12-07 RX ORDER — LEVOTHYROXINE SODIUM 150 MCG
TABLET ORAL
Qty: 90 TABLET | Refills: 0 | OUTPATIENT
Start: 2021-12-07

## 2021-12-07 NOTE — TELEPHONE ENCOUNTER
Spoke to Odalis - Caretaker for patient.  Has seen endo in the the past but meds had been followed by Dr Connell.  I scheduled patient for lab only appointment on Friday afternoon.  Please sign orders.  Patient had labs recently so if any other labs are due would like to make sure they get them all done.  Due for BMP in January.  Ting Andrade RN  [facility]

## 2021-12-08 NOTE — PROGRESS NOTES
Remote monitoring of Pulmonary Artery Pressures via CardioMEMS device reviewed at least weekly and as needed with CORE nursing. Diuretics adjusted accordingly.    Billing for billing dates of 11/5 through 12/4 morena CORNEJO NP-C

## 2021-12-09 ENCOUNTER — PATIENT OUTREACH (OUTPATIENT)
Dept: CARDIOLOGY | Facility: CLINIC | Age: 76
End: 2021-12-09
Payer: MEDICARE

## 2021-12-09 NOTE — TELEPHONE ENCOUNTER
[facility] CORE Clinic - CardioMEMS Reading Review    December 9, 2021   CardioMEMS reviewed.  Current diuretic: Bumex 4 mg daily  Recent plan of care changes: no changes. PAD in goal range.     Current Threshold parameters:       Today's Waveform:       Readings:           RHC Date Wedge Pressure MEMS PAD during cath  (average of the 3 values)     7/1/2019 w/MEMS implant     20 mmHg   18 mmHg           Rosina Mays RN

## 2021-12-15 ENCOUNTER — PATIENT OUTREACH (OUTPATIENT)
Dept: CARDIOLOGY | Facility: CLINIC | Age: 76
End: 2021-12-15
Payer: MEDICARE

## 2021-12-15 NOTE — TELEPHONE ENCOUNTER
[facility] CORE Clinic - CardioMEMS Reading Review    December 15, 2021   CardioMEMS reviewed.  Current diuretic: Bumex 4 mg daily  Recent plan of care changes: PAD in goal range.     Current Threshold parameters:       Today's Waveform:       Readings:           RHC Date Wedge Pressure MEMS PAD during cath  (average of the 3 values)     7/1/2019 w/MEMS implant     20 mmHg   18 mmHg           Rosina Mays RN

## 2021-12-16 ENCOUNTER — ALLIED HEALTH/NURSE VISIT (OUTPATIENT)
Dept: OTHER | Facility: CLINIC | Age: 76
End: 2021-12-16
Payer: MEDICARE

## 2021-12-16 DIAGNOSIS — R53.83 OTHER FATIGUE: Primary | ICD-10-CM

## 2021-12-16 PROCEDURE — 99207 PR COMMUNITY PARAMEDIC - PATIENT NOT BILLABLE: CPT

## 2021-12-19 VITALS
OXYGEN SATURATION: 95 % | DIASTOLIC BLOOD PRESSURE: 74 MMHG | HEART RATE: 59 BPM | SYSTOLIC BLOOD PRESSURE: 118 MMHG | TEMPERATURE: 98 F

## 2021-12-21 ENCOUNTER — PATIENT OUTREACH (OUTPATIENT)
Dept: CARDIOLOGY | Facility: CLINIC | Age: 76
End: 2021-12-21
Payer: MEDICARE

## 2021-12-21 ENCOUNTER — PATIENT OUTREACH (OUTPATIENT)
Dept: CARE COORDINATION | Facility: CLINIC | Age: 76
End: 2021-12-21
Payer: MEDICARE

## 2021-12-21 ASSESSMENT — ACTIVITIES OF DAILY LIVING (ADL): DEPENDENT_IADLS:: SHOPPING

## 2021-12-21 NOTE — PROGRESS NOTES
Clinic Care Coordination Contact    Situation: Patient chart reviewed by care coordinator.    Background: Patient is enrolled in care coordination and due for RN CC chart review.     Assessment: Per chart review, patient or patient's sister continue to update CHW at monthly check ins. All appointments are scheduled as needed.     Plan/Recommendations: Writer will chart review in 6 weeks, CHW to continue monthly outreaches.     Peggy Raines RN Care Coordinator     [facility] Ambulatory Care Management  [facility] and   [email or web address]    Office: [phone]

## 2021-12-21 NOTE — TELEPHONE ENCOUNTER
[facility] CORE Clinic - CardioMEMS Reading Review    December 21, 2021   CardioMEMS reviewed.  Current diuretic: Bumex 4 mg daily  Recent plan of care changes:     Current Threshold parameters:       Today's Waveform:       Readings:           RHC Date Wedge Pressure MEMS PAD during cath  (average of the 3 values)     7/1/2019 w/MEMS implant     20 mmHg   18 mmHg           Debbi Alcantara RN
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
## 2021-12-27 ENCOUNTER — PATIENT OUTREACH (OUTPATIENT)
Dept: CARE COORDINATION | Facility: CLINIC | Age: 76
End: 2021-12-27
Payer: MEDICARE

## 2021-12-27 DIAGNOSIS — N39.46 MIXED INCONTINENCE: ICD-10-CM

## 2021-12-27 RX ORDER — TOLTERODINE 2 MG/1
CAPSULE, EXTENDED RELEASE ORAL
Qty: 180 CAPSULE | Refills: 0 | Status: SHIPPED | OUTPATIENT
Start: 2021-12-27 | End: 2022-09-26

## 2021-12-27 NOTE — PROGRESS NOTES
Clinic Care Coordination Contact  [facility]/Voicemail       Clinical Data: Care Coordinator Outreach  Outreach attempted x 1.  Left message on patient's voicemail with call back information and requested return call. CHW left message sharing transition to a different clinic as on 1/3rd and left contacts for SWCC and RNCC.     Plan: Care Coordinator will try to reach patient again in 10 business days.    [facility] Care Coordination  [facility], [facility], and [facility]    Phone: [phone]

## 2021-12-29 ENCOUNTER — MYC MEDICAL ADVICE (OUTPATIENT)
Dept: ENDOCRINOLOGY | Facility: CLINIC | Age: 76
End: 2021-12-29
Payer: MEDICARE

## 2021-12-30 ENCOUNTER — CARE COORDINATION (OUTPATIENT)
Dept: CARDIOLOGY | Facility: CLINIC | Age: 76
End: 2021-12-30
Payer: MEDICARE

## 2021-12-30 NOTE — PROGRESS NOTES
[facility] CORE Clinic - CardioMEMS Reading Review    December 30, 2021   CardioMEMS reviewed.  Current diuretic: Bumex 4 mg daily  Recent plan of care changes:  None. PAD in goal range.     Current Threshold parameters:       Today's Waveform:       Readings:           RHC Date Wedge Pressure MEMS PAD during cath  (average of the 3 values)     7/1/2019 w/MEMS implant     20 mmHg   18 mmHg           Rosina Mays RN

## 2022-01-03 ENCOUNTER — ALLIED HEALTH/NURSE VISIT (OUTPATIENT)
Dept: CARDIOLOGY | Facility: CLINIC | Age: 77
End: 2022-01-03
Attending: NURSE PRACTITIONER
Payer: MEDICARE

## 2022-01-03 DIAGNOSIS — I50.32 CHRONIC DIASTOLIC HEART FAILURE (H): Primary | ICD-10-CM

## 2022-01-03 PROCEDURE — 93264 REM MNTR WRLS P-ART PRS SNR: CPT | Performed by: NURSE PRACTITIONER

## 2022-01-04 DIAGNOSIS — N18.30 TYPE 2 DIABETES MELLITUS WITH STAGE 3 CHRONIC KIDNEY DISEASE, WITH LONG-TERM CURRENT USE OF INSULIN (H): ICD-10-CM

## 2022-01-04 DIAGNOSIS — E11.22 TYPE 2 DIABETES MELLITUS WITH STAGE 3 CHRONIC KIDNEY DISEASE, WITH LONG-TERM CURRENT USE OF INSULIN (H): ICD-10-CM

## 2022-01-04 DIAGNOSIS — Z79.4 TYPE 2 DIABETES MELLITUS WITH STAGE 3 CHRONIC KIDNEY DISEASE, WITH LONG-TERM CURRENT USE OF INSULIN (H): ICD-10-CM

## 2022-01-04 RX ORDER — REPAGLINIDE 1 MG/1
1 TABLET ORAL
Qty: 180 TABLET | Refills: 3 | Status: ON HOLD | OUTPATIENT
Start: 2022-01-04 | End: 2022-07-13

## 2022-01-05 ENCOUNTER — TELEPHONE (OUTPATIENT)
Dept: ANESTHESIOLOGY | Facility: CLINIC | Age: 77
End: 2022-01-05
Payer: MEDICARE

## 2022-01-05 ENCOUNTER — MYC MEDICAL ADVICE (OUTPATIENT)
Dept: FAMILY MEDICINE | Facility: CLINIC | Age: 77
End: 2022-01-05
Payer: MEDICARE

## 2022-01-05 NOTE — TELEPHONE ENCOUNTER
LPN called the pt regarding their appointment on 1/19/22 with Dr. Mcneil. Pt was asked to call the clinic back to discuss appointment when available.     Rosina Ruggiero LPN

## 2022-01-06 NOTE — PROGRESS NOTES
Remote monitoring complete for billing period of 12/5/21 - 1/3/22. No changes to diuretics during this time, PAD has been overall in goal range of 14-18.

## 2022-01-06 NOTE — TELEPHONE ENCOUNTER
[facility] Call Center    Phone Message    May a detailed message be left on voicemail: yes     Reason for Call: Other: Patient was returning a call from Rosina. Please call back.     Action Taken: Message routed to:  [facility] Pain    Travel Screening: Not Applicable

## 2022-01-06 NOTE — TELEPHONE ENCOUNTER
LPN was able to speak to the pt and coordinate a new appointment, Pt was assisted to schedule 1/14/22.   Pt was previously seen by Dr. Porras, but would like to switch care to Dr. Mcneil -per a recommendation from their care team.   Pt is following up for increased pain in Tailbone.   Pt is interesting in scheduling injection if able.     Pt was recently seen by Orthopedic Urgent care, and had Xrays and MRI ordered. Pt will follow up with PCP regarding results from images- and may request a Steroid taper to help with inflammation, if indicated.     Provider updated of pt's plan.     Rosina Ruggiero LPN

## 2022-01-07 ENCOUNTER — CARE COORDINATION (OUTPATIENT)
Dept: CARDIOLOGY | Facility: CLINIC | Age: 77
End: 2022-01-07
Payer: MEDICARE

## 2022-01-07 NOTE — PROGRESS NOTES
Patient is overdue for follow up with Dr Wolf with labs and echo. Can you please call to schedule? Thanks,   Elena VERA

## 2022-01-07 NOTE — PROGRESS NOTES
Remote monitoring of Pulmonary Artery Pressures via CardioMEMS device reviewed at least weekly and as needed with CORE nursing. Diuretics adjusted accordingly.      12/5/21 through 1/3/22 billing morena CORNEJO

## 2022-01-07 NOTE — PROGRESS NOTES
[facility] CORE Clinic - CardioMEMS Reading Review    January 7, 2022   CardioMEMS reviewed.  Current diuretic: Bumex 4 mg daily  Recent plan of care changes: no changes. PAD in goal range.     Current Threshold parameters:       Today's Waveform:       Readings:           RHC Date Wedge Pressure MEMS PAD during cath  (average of the 3 values)     7/1/2019 w/MEMS implant     20 mmHg   18 mmHg           Rosina Mays RN

## 2022-01-11 ENCOUNTER — PATIENT OUTREACH (OUTPATIENT)
Dept: NURSING | Facility: CLINIC | Age: 77
End: 2022-01-11
Payer: MEDICARE

## 2022-01-11 NOTE — PROGRESS NOTES
"Clinic Care Coordination Contact    Follow Up Progress Note      Assessment: [facility] connected with patient's caregiver, Odalis for pro-active goal outreach. Odalis shares that patient is not doing very well as she seems to have hurt her tailbone. She shares she is frustrated as she is unable to get an appt until the 27th. She did bring patient to the Urgent Care but there was nothing they could do. They recommended patient have an MRI which patient is scheduled for. CC asked if they have been able to reach the [facility] and Odalis shared \"we wont qualify as I earn a significant income\" cc explained it might be worth it to get PCA services or to hire private home help. Patient was not agreeable to this at this time as she feels capable of managing it.    CC explained that there aren't any active social work goals so it might make sense to just work with the RNCC. Odalis shared she wants to make sure there is follow up so she would appreciate it if cc remained on her care team.    Care Gaps:    Health Maintenance Due   Topic Date Due     URINE DRUG SCREEN  Never done     MEDICARE ANNUAL WELLNESS VISIT  08/10/2017     DIABETIC FOOT EXAM  03/19/2019     MICROALBUMIN  06/16/2021     HF ACTION PLAN  11/30/2021     BMP  01/15/2022     HEMOGLOBIN  01/15/2022       Patient accepted scheduling phone number for [address] [place] [address]  to schedule independently     No Social work goals at this time- [facility] would like to transition patient to RNCC and patient asked cc to remain on her care team.  Goals addressed this encounter:   Goals Addressed    None         Intervention/Education provided during outreach: SWCC reviewed goals.          Plan:   Patient's caregiver to call the county to see what supports might be available.  Care Coordinator will follow up in 1 month.    Adelina Degroot [facility] Care Coordination  Tel: [phone]    "

## 2022-01-12 ENCOUNTER — ALLIED HEALTH/NURSE VISIT (OUTPATIENT)
Dept: OTHER | Facility: CLINIC | Age: 77
End: 2022-01-12
Payer: MEDICARE

## 2022-01-12 ENCOUNTER — CARE COORDINATION (OUTPATIENT)
Dept: CARDIOLOGY | Facility: CLINIC | Age: 77
End: 2022-01-12

## 2022-01-12 VITALS
RESPIRATION RATE: 14 BRPM | BODY MASS INDEX: 49.81 KG/M2 | TEMPERATURE: 98 F | OXYGEN SATURATION: 94 % | DIASTOLIC BLOOD PRESSURE: 72 MMHG | SYSTOLIC BLOOD PRESSURE: 110 MMHG | HEART RATE: 55 BPM | WEIGHT: 293 LBS

## 2022-01-12 DIAGNOSIS — Z79.4 TYPE 2 DIABETES MELLITUS WITH STAGE 3 CHRONIC KIDNEY DISEASE, WITH LONG-TERM CURRENT USE OF INSULIN, UNSPECIFIED WHETHER STAGE 3A OR 3B CKD (H): Chronic | ICD-10-CM

## 2022-01-12 DIAGNOSIS — R41.0 CONFUSION: Primary | ICD-10-CM

## 2022-01-12 DIAGNOSIS — N18.30 TYPE 2 DIABETES MELLITUS WITH STAGE 3 CHRONIC KIDNEY DISEASE, WITH LONG-TERM CURRENT USE OF INSULIN, UNSPECIFIED WHETHER STAGE 3A OR 3B CKD (H): Chronic | ICD-10-CM

## 2022-01-12 DIAGNOSIS — E55.9 VITAMIN D DEFICIENCY: ICD-10-CM

## 2022-01-12 DIAGNOSIS — E11.22 TYPE 2 DIABETES MELLITUS WITH STAGE 3 CHRONIC KIDNEY DISEASE, WITH LONG-TERM CURRENT USE OF INSULIN, UNSPECIFIED WHETHER STAGE 3A OR 3B CKD (H): Chronic | ICD-10-CM

## 2022-01-12 DIAGNOSIS — E89.0 POSTOPERATIVE HYPOTHYROIDISM: Chronic | ICD-10-CM

## 2022-01-12 DIAGNOSIS — E78.5 HYPERLIPIDEMIA WITH TARGET LDL LESS THAN 70: Chronic | ICD-10-CM

## 2022-01-12 PROCEDURE — 99207 PR COMMUNITY PARAMEDIC - PATIENT NOT BILLABLE: CPT

## 2022-01-12 NOTE — PROGRESS NOTES
[facility] CORE Clinic - CardioMEMS Reading Review    January 12, 2022   CardioMEMS reviewed.  Current diuretic: Bumex 4 mg daily  Recent plan of care changes: no changes. PAD in goal range.     Current Threshold parameters:       Today's Waveform:       Readings:           RHC Date Wedge Pressure MEMS PAD during cath  (average of the 3 values)     7/1/2019 w/MEMS implant     20 mmHg   18 mmHg           Rosina Mays RN

## 2022-01-12 NOTE — PROGRESS NOTES
[facility] Follow-up Visit  January 12, 2022  TIME: 1100    Mariel Ribeiro is a 76 year old female being seen at home for a follow-up visit.    Present at appointment:           Chief Complaint   Patient presents with     Outreach       [place] Utilization:      Utilization    Hospital Admissions  0             ED Visits  1             No Show Count (past year)  1                Current as of: 1/11/2022 11:47 AM              /72   Pulse 55   Temp 98  F (36.7  C)   Resp 14   Wt 144.2 kg (318 lb)   SpO2 94%   BMI 49.81 kg/m      Clinical Concerns:  Current Medical Concerns:  Cognitive decline, falls    Current Behavioral Concerns: Compliance with care plan.     Education Provided to patient: no   Medication set up? No  Pill Box issued: No  Scale issued: No  Flu Shot given: No  COVID Vaccine Given: No  Lab draw or specimen collection: No  Food box issued: No  Collaborative visit with PCP: No  Wound Care: No    Health Maintenance Reviewed:      Clinical Pathway: None    No  Face to Face in Home / Community    Recent blood sugars: 145, zero sugar vitamin water    Review of Symptoms/PE    Skin: negative  Eyes: visual blurring  Ears/Nose/Throat: negative  Respiratory: Dyspnea on exertion-   Cardiovascular: negative  Gastrointestinal: negative  Genitourinary: negative  Musculoskeletal: back pain and neck pain  Neurologic: headaches, numbness or tingling of hands, numbness or tingling of feet and memory problems  Psychiatric: negative    Pain Management::                 Plan:     Time spent with patient: 45    The patient meets one or more of the following criteria:  * Has been identified by their primary care provider at risk of nursing home placement    Acute concern/Follow-up recommendations: continue care plan.    Next CP visit scheduled: 1/20/22    Issues for Provider to follow up on: Mariel reports having a fall in the past few weeks but is unable to say when.  She seems to be having more  difficulty with finding words and acknowledges her memory loss.     Mariel has difficulty leaving her home so it was discussed the [facility] doing a blood draw in her home.  Mariel and her roommate Odalis would like that option.     Provider follow up visit needed: Multiple upcoming.

## 2022-01-13 DIAGNOSIS — G89.29 OTHER CHRONIC PAIN: ICD-10-CM

## 2022-01-13 RX ORDER — TRAMADOL HYDROCHLORIDE 50 MG/1
50 TABLET ORAL EVERY 6 HOURS PRN
Qty: 60 TABLET | Refills: 0 | Status: SHIPPED | OUTPATIENT
Start: 2022-01-13 | End: 2022-05-10

## 2022-01-13 NOTE — TELEPHONE ENCOUNTER
Patient sent My Chart message that she is totally out of meds.  Ting Andrade RN  [facility]

## 2022-01-13 NOTE — TELEPHONE ENCOUNTER
Routing refill request to provider for review/approval because:  Drug not on the [facility] refill protocol       Last Written Prescription Date:  11/3/21  Last Fill Quantity: 60,  # refills: 0   Last office visit: 11/23/2021 with prescribing provider:     Future Office Visit:   Next 5 appointments (look out 90 days)    Mar 28, 2022  2:00 PM  (Arrive by 1:45 PM)  Return Visit with Opal Weber MD  [facility] ([facility] ) [address] [place] [id number] [phone]

## 2022-01-14 ENCOUNTER — VIRTUAL VISIT (OUTPATIENT)
Dept: ANESTHESIOLOGY | Facility: CLINIC | Age: 77
End: 2022-01-14
Attending: NURSE PRACTITIONER
Payer: MEDICARE

## 2022-01-14 DIAGNOSIS — M54.41 BILATERAL LOW BACK PAIN WITH RIGHT-SIDED SCIATICA, UNSPECIFIED CHRONICITY: ICD-10-CM

## 2022-01-14 DIAGNOSIS — M53.3 COCCYDYNIA: Primary | ICD-10-CM

## 2022-01-14 PROCEDURE — 99215 OFFICE O/P EST HI 40 MIN: CPT | Mod: 95 | Performed by: ANESTHESIOLOGY
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
## 2022-01-14 NOTE — NURSING NOTE
Patient presents with:  RECHECK: UMP NEW,Patient reports, 9/10 tailbone      Data Unavailable     Pain Medications     Analgesics Other Refills Start End     acetaminophen (TYLENOL) 325 MG tablet     5/3/2019     Sig - Route: Take 650 mg by mouth every 4 hours as needed for mild pain Take 2 tablets by mouth every 4 hours as needed for mild pain - Oral    Class: Historical    Salicylates Refills Start End     aspirin (ASA) 81 MG EC tablet     5/3/2019     Sig - Route: Take 1 tablet (81 mg) by mouth daily - Oral    Class: Historical    Opioid Agonists Refills Start End     traMADol (ULTRAM) 50 MG tablet    0 1/13/2022     Sig - Route: Take 1 tablet (50 mg) by mouth every 6 hours as needed for breakthrough pain or severe pain - Oral    Class: E-Prescribe          What medications are you using for pain?   Tramadol, acetaminophen lyrica,         (Return Patients only) What refills are you needing today? N/A      Neto Wei, EMT

## 2022-01-16 ENCOUNTER — MYC MEDICAL ADVICE (OUTPATIENT)
Dept: FAMILY MEDICINE | Facility: CLINIC | Age: 77
End: 2022-01-16
Payer: MEDICARE

## 2022-01-17 DIAGNOSIS — Z11.59 ENCOUNTER FOR SCREENING FOR OTHER VIRAL DISEASES: Primary | ICD-10-CM

## 2022-01-17 DIAGNOSIS — I25.10 ATHEROSCLEROSIS OF NATIVE CORONARY ARTERY WITHOUT ANGINA PECTORIS, UNSPECIFIED WHETHER NATIVE OR TRANSPLANTED HEART: ICD-10-CM

## 2022-01-17 RX ORDER — ATORVASTATIN CALCIUM 40 MG/1
40 TABLET, FILM COATED ORAL DAILY
Qty: 90 TABLET | Refills: 0 | Status: SHIPPED | OUTPATIENT
Start: 2022-01-17 | End: 2022-04-11

## 2022-01-17 NOTE — TELEPHONE ENCOUNTER
Statins Protocol Passed 01/17/2022 08:33 AM   Protocol Details  LDL on file in past 12 months    No abnormal creatine kinase in past 12 months    Recent (12 mo) or future (30 days) visit within the authorizing provider's specialty    Medication is active on med list    Patient is age 18 or older    No active pregnancy on record    No positive pregnancy test in past 12 months

## 2022-01-18 NOTE — TELEPHONE ENCOUNTER
The pt is scheduled on 3/9 for imaging and lab at the [facility] and on 3/16 with a video visit .  Heladio Bowden on 1/18/2022 at 2:01 PM    The pts care giver was on the phone during scheduling of the above appointments and agreed to the dates times and locations of the appointments.

## 2022-01-19 ENCOUNTER — CARE COORDINATION (OUTPATIENT)
Dept: CARDIOLOGY | Facility: CLINIC | Age: 77
End: 2022-01-19
Payer: MEDICARE

## 2022-01-19 DIAGNOSIS — I50.32 CHRONIC DIASTOLIC HEART FAILURE (H): Primary | ICD-10-CM

## 2022-01-19 NOTE — PROGRESS NOTES
[facility] - CardioMEMS Reading Review    January 19, 2022   CardioMEMS reviewed.  Current diuretic: Bumex 4 mg daily  Recent plan of care changes: none. PAD in goal range.     Current Threshold parameters:       Today's Waveform:       Readings:           RHC Date Wedge Pressure MEMS PAD during cath  (average of the 3 values)     7/1/2019 w/MEMS implant     20 mmHg   18 mmHg           Rosina Mays RN

## 2022-01-20 ENCOUNTER — LAB (OUTPATIENT)
Dept: LAB | Facility: CLINIC | Age: 77
End: 2022-01-20
Payer: MEDICARE

## 2022-01-20 ENCOUNTER — ALLIED HEALTH/NURSE VISIT (OUTPATIENT)
Dept: OTHER | Facility: CLINIC | Age: 77
End: 2022-01-20
Payer: MEDICARE

## 2022-01-20 VITALS
OXYGEN SATURATION: 96 % | TEMPERATURE: 97.6 F | DIASTOLIC BLOOD PRESSURE: 72 MMHG | HEART RATE: 69 BPM | SYSTOLIC BLOOD PRESSURE: 118 MMHG | BODY MASS INDEX: 49.81 KG/M2 | WEIGHT: 293 LBS

## 2022-01-20 DIAGNOSIS — M54.50 LOWER BACK PAIN: Primary | ICD-10-CM

## 2022-01-20 DIAGNOSIS — G89.4 CHRONIC PAIN SYNDROME: ICD-10-CM

## 2022-01-20 DIAGNOSIS — E11.22 TYPE 2 DIABETES MELLITUS WITH STAGE 3 CHRONIC KIDNEY DISEASE, WITH LONG-TERM CURRENT USE OF INSULIN, UNSPECIFIED WHETHER STAGE 3A OR 3B CKD (H): ICD-10-CM

## 2022-01-20 DIAGNOSIS — N18.30 TYPE 2 DIABETES MELLITUS WITH STAGE 3 CHRONIC KIDNEY DISEASE, WITH LONG-TERM CURRENT USE OF INSULIN, UNSPECIFIED WHETHER STAGE 3A OR 3B CKD (H): ICD-10-CM

## 2022-01-20 DIAGNOSIS — Z79.4 TYPE 2 DIABETES MELLITUS WITH STAGE 3 CHRONIC KIDNEY DISEASE, WITH LONG-TERM CURRENT USE OF INSULIN, UNSPECIFIED WHETHER STAGE 3A OR 3B CKD (H): ICD-10-CM

## 2022-01-20 DIAGNOSIS — N18.4 CKD (CHRONIC KIDNEY DISEASE) STAGE 4, GFR 15-29 ML/MIN (H): ICD-10-CM

## 2022-01-20 LAB
AMPHETAMINES UR QL: NOT DETECTED
BARBITURATES UR QL SCN: NOT DETECTED
BENZODIAZ UR QL SCN: NOT DETECTED
BUPRENORPHINE UR QL: NOT DETECTED
CANNABINOIDS UR QL: NOT DETECTED
COCAINE UR QL SCN: NOT DETECTED
CREAT UR-MCNC: 102 MG/DL
D-METHAMPHET UR QL: NOT DETECTED
METHADONE UR QL SCN: NOT DETECTED
MICROALBUMIN UR-MCNC: 326 MG/L
MICROALBUMIN/CREAT UR: 319.61 MG/G CR (ref 0–25)
OPIATES UR QL SCN: NOT DETECTED
OXYCODONE UR QL SCN: NOT DETECTED
PCP UR QL SCN: NOT DETECTED
PROPOXYPH UR QL: NOT DETECTED
TRICYCLICS UR QL SCN: NOT DETECTED

## 2022-01-20 PROCEDURE — 99207 PR COMMUNITY PARAMEDIC - PATIENT NOT BILLABLE: CPT

## 2022-01-20 PROCEDURE — 82043 UR ALBUMIN QUANTITATIVE: CPT

## 2022-01-20 NOTE — PROGRESS NOTES
[facility] Follow-up Visit  January 20, 2022  TIME: 1200    Mariel Ribeiro is a 76 year old female being seen at home for a follow-up visit.    Present at appointment:           Chief Complaint   Patient presents with     Blood Draw       Universal Utilization:      Utilization    Hospital Admissions  0             ED Visits  1             No Show Count (past year)  0                Current as of: 1/20/2022 10:51 AM              /72   Pulse 69   Temp 97.6  F (36.4  C)   Wt 144.2 kg (318 lb)   SpO2 96%   BMI 49.81 kg/m      Clinical Concerns:  Current Medical Concerns:  Back pain, cognitive decline    Current Behavioral Concerns: no    Education Provided to patient: no   Medication set up? No  Pill Box issued: No  Scale issued: No  Flu Shot given: No  COVID Vaccine Given: No  Lab draw or specimen collection: Yes  Food box issued: No  Collaborative visit with PCP: No  Wound Care: No    Health Maintenance Reviewed:      Clinical Pathway: None    No  Face to Face in Home / Community    Recent blood sugars: 234    Review of Symptoms/PE    Skin: negative  Eyes: negative  Ears/Nose/Throat: negative  Respiratory: No shortness of breath, dyspnea on exertion, cough, or hemoptysis  Cardiovascular: negative  Gastrointestinal: negative  Genitourinary: negative  Musculoskeletal: back pain  Neurologic: negative, numbness or tingling of hands and memory problems  Psychiatric: negative    Pain Management::                 Plan:     Time spent with patient: 45    The patient meets one or more of the following criteria:  * Has been identified by their primary care provider at risk of nursing home placement    Acute concern/Follow-up recommendations: Continue care plan.    Next CP visit scheduled: TBD    Issues for Provider to follow up on: A venous blood draw was attempted but unsuccessful.  A urine sample was obtained and a lab appointment scheduled for the same day as her Covid test.    Provider follow up visit  needed: Multiple upcoming.

## 2022-01-24 ENCOUNTER — PATIENT OUTREACH (OUTPATIENT)
Dept: CARDIOLOGY | Facility: CLINIC | Age: 77
End: 2022-01-24
Payer: MEDICARE

## 2022-01-24 NOTE — TELEPHONE ENCOUNTER
[facility] - CardioMEMS Reading Review    January 24, 2022   CardioMEMS reviewed.  Current diuretic: Bumex 4 mg daily  Recent plan of care changes: none. PAD with poor waveform couple days last week, then slightly elevated last two readings. Didn't send reading yesterday. Update sent to Mariel via [employer] and asked her to send readings daily.     Current Threshold parameters:       Today's Waveform:       Readings:           C Date Wedge Pressure MEMS PAD during cath  (average of the 3 values)     7/1/2019 w/MEMS implant     20 mmHg   18 mmHg           Rosina Mays RN

## 2022-01-25 ENCOUNTER — APPOINTMENT (OUTPATIENT)
Dept: CT IMAGING | Facility: CLINIC | Age: 77
End: 2022-01-25
Attending: EMERGENCY MEDICINE
Payer: MEDICARE

## 2022-01-25 ENCOUNTER — HOSPITAL ENCOUNTER (OUTPATIENT)
Dept: MRI IMAGING | Facility: CLINIC | Age: 77
Discharge: HOME | End: 2022-01-25
Attending: PHYSICIAN ASSISTANT | Admitting: PHYSICIAN ASSISTANT
Payer: MEDICARE

## 2022-01-25 ENCOUNTER — HOSPITAL ENCOUNTER (EMERGENCY)
Facility: CLINIC | Age: 77
Discharge: HOME | End: 2022-01-25
Attending: EMERGENCY MEDICINE | Admitting: EMERGENCY MEDICINE
Payer: MEDICARE

## 2022-01-25 VITALS
SYSTOLIC BLOOD PRESSURE: 177 MMHG | TEMPERATURE: 96 F | HEART RATE: 65 BPM | DIASTOLIC BLOOD PRESSURE: 95 MMHG | RESPIRATION RATE: 16 BRPM

## 2022-01-25 DIAGNOSIS — S09.90XA CLOSED HEAD INJURY, INITIAL ENCOUNTER: ICD-10-CM
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
## 2022-01-25 DIAGNOSIS — M53.3 COCCYX PAIN: ICD-10-CM

## 2022-01-25 DIAGNOSIS — M53.3 PAIN IN THE COCCYX: ICD-10-CM

## 2022-01-25 LAB
RADIOLOGIST FLAGS: NORMAL
SARS-COV-2 RNA RESP QL NAA+PROBE: NEGATIVE

## 2022-01-25 PROCEDURE — 70450 CT HEAD/BRAIN W/O DYE: CPT | Mod: 26 | Performed by: RADIOLOGY

## 2022-01-25 PROCEDURE — G1004 CDSM NDSC: HCPCS

## 2022-01-25 PROCEDURE — C9803 HOPD COVID-19 SPEC COLLECT: HCPCS | Performed by: EMERGENCY MEDICINE

## 2022-01-25 PROCEDURE — U0005 INFEC AGEN DETEC AMPLI PROBE: HCPCS | Performed by: EMERGENCY MEDICINE

## 2022-01-25 PROCEDURE — 99284 EMERGENCY DEPT VISIT MOD MDM: CPT | Performed by: EMERGENCY MEDICINE

## 2022-01-25 PROCEDURE — 72195 MRI PELVIS W/O DYE: CPT | Mod: 26 | Performed by: RADIOLOGY

## 2022-01-25 PROCEDURE — 72125 CT NECK SPINE W/O DYE: CPT | Mod: 26 | Performed by: RADIOLOGY

## 2022-01-25 PROCEDURE — 99285 EMERGENCY DEPT VISIT HI MDM: CPT | Mod: 25 | Performed by: EMERGENCY MEDICINE

## 2022-01-25 PROCEDURE — G1004 CDSM NDSC: HCPCS | Mod: GC | Performed by: RADIOLOGY

## 2022-01-25 PROCEDURE — 72195 MRI PELVIS W/O DYE: CPT

## 2022-01-25 RX ORDER — METHYLPREDNISOLONE 4 MG
TABLET, DOSE PACK ORAL
Qty: 21 TABLET | Refills: 0 | Status: SHIPPED | OUTPATIENT
Start: 2022-01-25 | End: 2022-05-31

## 2022-01-25 RX ORDER — HYDROCODONE BITARTRATE AND ACETAMINOPHEN 5; 325 MG/1; MG/1
1 TABLET ORAL EVERY 6 HOURS PRN
Qty: 10 TABLET | Refills: 0 | Status: SHIPPED | OUTPATIENT
Start: 2022-01-25 | End: 2022-01-28

## 2022-01-25 ASSESSMENT — ENCOUNTER SYMPTOMS
BACK PAIN: 1
WEAKNESS: 1
FATIGUE: 0
HEADACHES: 1
VOMITING: 0
FEVER: 0
NAUSEA: 0

## 2022-01-25 NOTE — ED TRIAGE NOTES
Pt BIBA f/home after fall this am. Pt fell forward and hit face. Pt is unsure of LOC.  Denies taking any blood thinners.  Pt states she does have a headache rated 7/10.  MD notified, now assessing patient.

## 2022-01-25 NOTE — DISCHARGE INSTRUCTIONS
Please take the pain medications as needed.     Try taking the steroid medication for the pain.     Follow up on Friday as scheduled for the injection.     Return to the ER if any other problems/concerns.
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
## 2022-01-26 ENCOUNTER — PATIENT OUTREACH (OUTPATIENT)
Dept: CARE COORDINATION | Facility: CLINIC | Age: 77
End: 2022-01-26
Payer: MEDICARE

## 2022-01-26 ENCOUNTER — PATIENT OUTREACH (OUTPATIENT)
Dept: OTHER | Facility: CLINIC | Age: 77
End: 2022-01-26
Payer: MEDICARE

## 2022-01-26 ASSESSMENT — ACTIVITIES OF DAILY LIVING (ADL): DEPENDENT_IADLS:: SHOPPING

## 2022-01-26 NOTE — PROGRESS NOTES
The [facility] reached out to Mariel for an ER  F/U.  We scheduled an appointment for 1/31/22.    Rafaela Cordoba NRP, CP  [facility]  Phone number [phone]  [email or web address]

## 2022-01-26 NOTE — PROGRESS NOTES
Clinic Care Coordination Contact  [facility]/Voicemail    Referral Source: Care Team  Clinical Data: Care Coordinator Outreach  Outreach attempted x 1.   Plan: Care Coordinator will try to reach patient again in 1-2 business days.    Peggy Raines, RN Care Coordinator     [facility] Ambulatory Care Management  [facility]

## 2022-01-28 ENCOUNTER — ANCILLARY PROCEDURE (OUTPATIENT)
Dept: RADIOLOGY | Facility: AMBULATORY SURGERY CENTER | Age: 77
End: 2022-01-28
Attending: ANESTHESIOLOGY
Payer: MEDICARE

## 2022-01-28 ENCOUNTER — HOSPITAL ENCOUNTER (OUTPATIENT)
Facility: AMBULATORY SURGERY CENTER | Age: 77
Discharge: HOME | End: 2022-01-28
Attending: ANESTHESIOLOGY | Admitting: ANESTHESIOLOGY
Payer: MEDICARE

## 2022-01-28 VITALS
RESPIRATION RATE: 20 BRPM | SYSTOLIC BLOOD PRESSURE: 169 MMHG | TEMPERATURE: 96.6 F | DIASTOLIC BLOOD PRESSURE: 92 MMHG | OXYGEN SATURATION: 95 %

## 2022-01-28 PROCEDURE — 64999 UNLISTED PX NERVOUS SYSTEM: CPT

## 2022-01-28 RX ORDER — LIDOCAINE HYDROCHLORIDE 10 MG/ML
INJECTION, SOLUTION EPIDURAL; INFILTRATION; INTRACAUDAL; PERINEURAL PRN
Status: DISCONTINUED | OUTPATIENT
Start: 2022-01-28 | End: 2022-01-28 | Stop reason: HOSPADM

## 2022-01-28 RX ORDER — METHYLPREDNISOLONE ACETATE 40 MG/ML
INJECTION, SUSPENSION INTRA-ARTICULAR; INTRALESIONAL; INTRAMUSCULAR; SOFT TISSUE PRN
Status: DISCONTINUED | OUTPATIENT
Start: 2022-01-28 | End: 2022-01-28 | Stop reason: HOSPADM

## 2022-01-28 RX ORDER — DIPHENHYDRAMINE HCL 25 MG
25 TABLET ORAL ONCE
Status: COMPLETED | OUTPATIENT
Start: 2022-01-28 | End: 2022-01-28

## 2022-01-28 RX ORDER — BUPIVACAINE HYDROCHLORIDE 2.5 MG/ML
INJECTION, SOLUTION EPIDURAL; INFILTRATION; INTRACAUDAL PRN
Status: DISCONTINUED | OUTPATIENT
Start: 2022-01-28 | End: 2022-01-28 | Stop reason: HOSPADM

## 2022-01-28 RX ADMIN — Medication 25 MG: at 11:53

## 2022-01-28 NOTE — PROGRESS NOTES
Unable to get BP reading post procedure. Tried several times but machine does not work for pt and she refuses to have it taken again. She states she will use her at home machine to get one when she gets home.
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
## 2022-01-28 NOTE — DISCHARGE INSTRUCTIONS
"PAIN INJECTION HOME CARE INSTRUCTIONS  Activity  -You may resume most normal activity levels with the exception of strenuous activity. It may help to move in ways that hurt before the injection, to see if the pain is still there, but do not overdo it. Take it easy for the rest of the day.    -DO NOT remove bandaid for 24 hours  -DO NOT shower for 24 hours      Pain  -You may feel immediate pain relief and numbness for a period of time after the injection. This may indicate the medication has reached the right spot.  -Your pain may return after this short pain-free period, or may even be a little worse for a day or two. It may be caused by needle irritation or by the medication itself. The medications usually take two or three days to start working, but can take as long as a week.    -You may use an ice pack for 20 minutes every 2 hours for the first 24 hours  -You may use a heating pad after the first 24 hours  -You may use Tylenol (acetaminophen) every 4 hours or other pain medicines as directed by your physician      DID YOU RECEIVE STEROIDS TODAY?  {YES / NO:411285::\"Yes\"}    Common side effects of steroids:  Not everyone will experience corticosteroid side effects. If side effects are experienced, they will gradually subside in the 7-10 day period following an injection. Most common side effects include:  -Flushed face and/or chest  -Feeling of warmth, particularly in the face but could be an overall feeling of warmth  -Increased blood sugar in diabetic patients  -Menstrual irregularities my occur. If taking hormone-based birth control an alternate method of birth control is recommended  -Sleep disturbances and/or mood swings are possible  -Leg cramps    PLEASE KEEP TRACK OF YOUR SYMPTOMS AND NOTE ANY CHANGES FOR YOUR DOCTOR.       Please contact us if you have:  -Severe pain  -Fever more than 101.5 degrees Fahrenheit  -Signs of infection at the injection site (redness, swelling, or drainage)      FOR PAIN " CENTER PATIENTS:  If you have questions, please contact the Pain Clinic at [phone] Option #1 between the hours of 7:00 am and 3:00 pm Monday through Friday. After office hours you can contact the on call provider by dialing [phone]. If you need immediate attention, we recommend that you go to a hospital emergency room or dial [phone].      FOR PM&R PATIENTS:  For patients seen by the PM and R service, please call [phone]. If you need to fax a pain diary to PM&R the fax number is [phone]. If you are unable to fax, uploading to [employer] is encouraged, then send to provider. If you have procedure scheduling questions please call [phone] Option #2

## 2022-01-30 DIAGNOSIS — I25.10 ATHEROSCLEROSIS OF NATIVE CORONARY ARTERY WITHOUT ANGINA PECTORIS, UNSPECIFIED WHETHER NATIVE OR TRANSPLANTED HEART: ICD-10-CM

## 2022-01-30 DIAGNOSIS — E78.5 HYPERLIPIDEMIA WITH TARGET LDL LESS THAN 70: ICD-10-CM

## 2022-01-31 ENCOUNTER — PATIENT OUTREACH (OUTPATIENT)
Dept: CARDIOLOGY | Facility: CLINIC | Age: 77
End: 2022-01-31
Payer: MEDICARE

## 2022-01-31 ENCOUNTER — ALLIED HEALTH/NURSE VISIT (OUTPATIENT)
Dept: OTHER | Facility: CLINIC | Age: 77
End: 2022-01-31
Payer: MEDICARE

## 2022-01-31 VITALS
OXYGEN SATURATION: 95 % | DIASTOLIC BLOOD PRESSURE: 78 MMHG | TEMPERATURE: 98 F | HEART RATE: 71 BPM | SYSTOLIC BLOOD PRESSURE: 122 MMHG

## 2022-01-31 DIAGNOSIS — M54.50 LOWER BACK PAIN: Primary | ICD-10-CM

## 2022-01-31 PROCEDURE — 99207 PR COMMUNITY PARAMEDIC - PATIENT NOT BILLABLE: CPT

## 2022-01-31 NOTE — TELEPHONE ENCOUNTER
[facility] - CardioMEMS Reading Review    January 31, 2022   CardioMEMS reviewed.  Current diuretic: Bumex 4 mg daily  Recent plan of care changes: none. Hasn't been sending readings on a daily basis. Has an injured tailbone and per mychart has been difficult to lay down on mems pillow. Readings she has sent recently have been elevated (4 of the last 5 readings).       Current Threshold parameters:       Today's Waveform:       Readings:           RHC Date Wedge Pressure MEMS PAD during cath  (average of the 3 values)     7/1/2019 w/MEMS implant     20 mmHg   18 mmHg           Rosina Mays RN

## 2022-01-31 NOTE — PROGRESS NOTES
[facility] Follow-up Visit  January 31, 2022  TIME: 1300    Mariel Ribeiro is a 76 year old female being seen at home for a follow-up visit.    Present at appointment:           Chief Complaint   Patient presents with     Outreach          Utilization    Hospital Admissions  0             ED Visits  2             No Show Count (past year)  0                Current as of: 1/31/2022  7:30 AM              /78   Pulse 71   Temp 98  F (36.7  C)   SpO2 95%     Clinical Concerns:  Current Medical Concerns:  Falls    Current Behavioral Concerns: no    Education Provided to patient: no   Medication set up? No  Pill Box issued: No  Scale issued: No  Flu Shot given: No  COVID Vaccine Given: No  Lab draw or specimen collection: No  Food box issued: No  Collaborative visit with PCP: No  Wound Care: No    Health Maintenance Reviewed:      Clinical Pathway: None    No  Face to Face in Home / Community    Recent blood sugars: 125 fasting    Review of Symptoms/PE    Skin: bruising, L hand, both knees and face in various stages of bruising  Eyes: negative  Ears/Nose/Throat: negative  Respiratory: No dyspnea on exertion  Cardiovascular: negative  Gastrointestinal: negative  Genitourinary: negative  Musculoskeletal: back pain and injury to L hand, knees, face  Neurologic: numbness or tingling of hands and numbness or tingling of feet  Psychiatric: negative    Pain Management::                 Plan:     Time spent with patient: 60    The patient meets one or more of the following criteria:  * Requires services to prevent readmission to a nursing home or hospital      Next CP visit scheduled: 2/21/22    Issues for Provider to follow up on:  I visited Mariel today for an ER F/U.  Mariel reports that she has not yet felt any relief from her Ganglion injection on 1/28/22.  She is also reporting pain in her L hand, knees and R hip from a fall on 1/25/22 for which she was seen at the ER.  They report Mariel falling out of her  bed the night before to which the Fire Department had to come out for a lift assist.      Mariel is concerned about taking any narcotics for pain.  I encouraged her to reach out to her PCP/ clinic for questions and concerns.  We reviewed her medications and discussed a MTM visit if appropriate.      Mariel and her roommate Odalis would like to get in home PT if possible. They also have concerns about the results of her Albumin Random urine sample which I also encouraged them to reach out to the clinic for.  Odalis would like to make an ER F/U ppointment for Mariel to see Malika Wolf and said she will call to set one up if possible.       Provider follow up visit needed: Multiple upcoming.

## 2022-02-01 RX ORDER — ATORVASTATIN CALCIUM 40 MG/1
TABLET, FILM COATED ORAL
Qty: 0.1 TABLET | Refills: 0 | OUTPATIENT
Start: 2022-02-01

## 2022-02-01 RX ORDER — NIACIN 500 MG/1
TABLET, EXTENDED RELEASE ORAL
Qty: 180 TABLET | Refills: 0 | Status: SHIPPED | OUTPATIENT
Start: 2022-02-01 | End: 2022-04-20

## 2022-02-01 NOTE — TELEPHONE ENCOUNTER
Antihyperlipidemic agents Passed 01/30/2022 05:27 PM   Protocol Details  Lipid panel on file in past 12 mos    Normal serum ALT on record in past 12 mos    Recent (12 mo) or future (30 days) visit within the authorizing provider's specialty    Medication is active on med list    Patient is age 18 years or older    No active pregnancy on record    No positive pregnancy test in past 12 mos

## 2022-02-01 NOTE — TELEPHONE ENCOUNTER
Duplicate see script sent 1/17/2022 atorvastatin (LIPITOR) 40 MG tablet 90 tablet NEEDS FASTING LABS.    TC: please call and schedule fasting labs.    Thank you

## 2022-02-02 ENCOUNTER — ALLIED HEALTH/NURSE VISIT (OUTPATIENT)
Dept: CARDIOLOGY | Facility: CLINIC | Age: 77
End: 2022-02-02
Attending: NURSE PRACTITIONER
Payer: MEDICARE

## 2022-02-02 DIAGNOSIS — I50.32 CHRONIC DIASTOLIC HEART FAILURE (H): Primary | ICD-10-CM

## 2022-02-02 PROCEDURE — 93264 REM MNTR WRLS P-ART PRS SNR: CPT | Performed by: NURSE PRACTITIONER

## 2022-02-02 NOTE — TELEPHONE ENCOUNTER
Cardiomems reviewed by provider. Have since come down to 16 yesterday. Called Mariel to send a reading today and will review.   Spoke with Odalis her roommate. Odalis reports Mariel was actually on large doses of steroids last week which correlates with elevated readings. She reports she was urinating a lot on Sunday. She has been a little more short of breath than normal.   Odalis will make sure Mariel sends a reading later today and will follow up.   She is done with the steroids as of this past Sunday.

## 2022-02-02 NOTE — PROCEDURES
GANGLION IMPAR INJECTION / SACROCOCCYGEAL LIGAMENT INJECTION  PROCEDURE:  1) Ganglion impar injection  2) Sacrococcygeal ligament injection  3) Fluoroscopic needle guidance    REASON FOR PROCEDURE: Coccydynia    PHYSICIAN: Lis Mcneil MD, Hernandez Ruby MD (pain fellow)    MEDICATIONS INJECTED: 1 mL of kenalog (40 mg) and 4 mL of 0.25% bupvacaine. 5 mL of this mixture was injected at the ganglion impar.      LOCAL ANESTHETIC INJECTED: 4 mL of 1% lidocaine    SEDATION MEDICATIONS: None    ESTIMATED BLOOD LOSS: None    COMPLICATIONS: None    TECHNIQUE: Time-out was taken to identify the correct patient, procedure and side prior to starting the procedure. The patient is placed in the prone position, the area was widely prepped and draped in the usual sterile fashion using DuraPrep and a fenestrated drape.    Local anesthetic was given by raising a skin wheal and going down to the hub of a 27-gauge 1.25-inch needle. A 22G 3.5 inch spinal needle was then introduced through the sacrococcygeal ligament to reach the anterior edge of the sacrum. Gadolinium was injected to confirm appropriate placement and that there was no vascular runoff. 5 mL of the above injectate was injected at the ganglion impar.     The patient was monitored with blood pressure and pulse oximetry machines with the assistance of an RN throughout the procedure.  The patient was alert and responsive to questions throughout the procedure. The patient tolerated the procedure well and was observed in the post-procedural area.    The procedure was completed without complications and was tolerated well. The patient was monitored after the procedure. The patient (or responsible party) was given post-procedure and discharge instructions to follow at home. The patient was discharged in stable condition. A follow-up appointment was made.    Pre-procedure pain score: 10/10    Post-procedure pain score: 0/10    Lis Mcneil MD    Pain  Medicine  Department of Anesthesiology  [facility]

## 2022-02-02 NOTE — TELEPHONE ENCOUNTER
Cardiomems today is 17. Now back in goal range (14-18) for last 2 days. Will review with provider.
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
## 2022-02-03 NOTE — PROGRESS NOTES
[employer] monitoring complete for billing period of 1/4/22 through 2/2/22.     No changes to diuretics during this time, PAD has been overall stable. Did have an increase in PAD over last week, came down without needing to increase diuretics after patient completed a burst of steroids.     Rosina Mays RN

## 2022-02-03 NOTE — TELEPHONE ENCOUNTER
Reviewed with Austin Miguel NP. Ok to hold off on any Bumex increases. Will continue to monitor.     Called and reviewed with Mariel who verbalized understanding. Rosina Mays RN

## 2022-02-04 NOTE — PROGRESS NOTES
Outcome for 02/04/22 1:19 PM: [employer] message sent  TERESITA Steiner    Outcome for 02/04/22 2:06 PM: Data uploaded on [employer]  Outcome for 02/04/22 2:06 PM: Data emailed to provider  TERESITA Steiner

## 2022-02-06 ENCOUNTER — NURSE TRIAGE (OUTPATIENT)
Dept: NURSING | Facility: CLINIC | Age: 77
End: 2022-02-06
Payer: MEDICARE

## 2022-02-06 NOTE — TELEPHONE ENCOUNTER
Pt's roommate is calling in about redness on her second toe of the left foot she just noticed, after a fall on 1/25/2022. Pt was evaluated in the ER after the fall, but did not notice anything on her toe. Pt's roommate reports it is reddened, and puffy. Pt denies any pain. Pt is able to move the toe normally.   Care advice given, use of warmth, heating pad, or warm foot soak, and per protocol pt should be able to treat this at home. Pt's roommate was advised to call back if care advice does not work, or if symptoms worsen. Pt's roommate verbalized understanding.     Omar Barragan RN on 2/6/2022 at 5:02 PM      Reason for Disposition    Toe swelling, bruise or pain    Additional Information    Negative: [1] Major bleeding (e.g., spurting blood) AND [2] can't be stopped    Negative: Foot or ankle injury    Negative: Looks infected    Negative: Amputated toe    Negative: Skin is split open or gaping (or length > 1/2 inch or 12 mm)    Negative: [1] Bleeding AND [2] won't stop after 10 minutes of direct pressure (using correct technique)    Negative: [1] Dirt in the wound AND [2] not removed with 15 minutes of scrubbing    Negative: Sounds like a serious injury to the triager    Negative: [1] SEVERE pain AND [2] not improved 2 hours after pain medicine/ice packs    Negative: [1] MODERATE-SEVERE pain AND [2] blood present under the toenail    Negative: Looks like a broken bone (e.g., crooked or deformed)    Negative: Looks like a dislocated joint (e.g., crooked or deformed)    Negative: Toenail is completely torn off (toenail avulsion)    Negative: Base of toenail has popped out of the skin fold (nail base dislocation)    Negative: [1] Toe injury AND [2] bad limp or can't wear shoes/sandals    Negative: [1] No prior tetanus shots (or is not fully vaccinated) AND [2] any wound (e.g., cut, scrape)    Negative: [1] HIV positive or severe immunodeficiency (severely weak immune system) AND [2] DIRTY cut or scrape     Negative: [1] Last tetanus shot > 5 years ago AND [2] DIRTY cut or scrape    Negative: [1] Last tetanus shot > 10 years ago AND [2] CLEAN cut or scrape (e.g., object AND skin were clean)    Negative: [1] Has diabetes (diabetes mellitus) AND [2] small cut or scrape    Negative: Large swelling or bruise    Negative: [1] Toenail comes off or is almost off AND [2] follows old injury AND [3] wants doctor to remove it    Negative: [1] After 3 days AND [2] pain not improved    Negative: [1] After 1 week AND [2] not using the toe normally    Negative: Stubbed or jammed toe    Negative: Bruised toenail    Negative: Torn toenail    Negative: [1] Toenail comes off or is almost off AND [2] follows old injury    Protocols used: TOE INJURY-A-AH

## 2022-02-07 ENCOUNTER — TELEPHONE (OUTPATIENT)
Dept: FAMILY MEDICINE | Facility: CLINIC | Age: 77
End: 2022-02-07
Payer: MEDICARE

## 2022-02-07 NOTE — TELEPHONE ENCOUNTER
PT's roommate, Odalis, called.  I am not finding the C2C on file.    PT fell 2 weeks ago.  Fell flat on face.  Bruising all over body.  Toes are puffy and red. Puffy only on top of 1 toe on one foot.  Pt has diabetes.  This is a funny toe.    Made video appt for 2/8/22.  Odalis will try to send a picture of the toe on [employer] for Malika Wofl.  PT can be challenging to get into clinic.  JODY Lopez

## 2022-02-08 ENCOUNTER — VIRTUAL VISIT (OUTPATIENT)
Dept: ENDOCRINOLOGY | Facility: CLINIC | Age: 77
End: 2022-02-08
Payer: MEDICARE

## 2022-02-08 ENCOUNTER — VIRTUAL VISIT (OUTPATIENT)
Dept: FAMILY MEDICINE | Facility: CLINIC | Age: 77
End: 2022-02-08
Payer: MEDICARE

## 2022-02-08 DIAGNOSIS — M54.50 BILATERAL LOW BACK PAIN, UNSPECIFIED CHRONICITY, UNSPECIFIED WHETHER SCIATICA PRESENT: Chronic | ICD-10-CM

## 2022-02-08 DIAGNOSIS — F33.41 RECURRENT MAJOR DEPRESSIVE DISORDER, IN PARTIAL REMISSION (H): ICD-10-CM

## 2022-02-08 DIAGNOSIS — E66.01 MORBID OBESITY (H): ICD-10-CM

## 2022-02-08 DIAGNOSIS — M79.675 PAIN OF TOE OF LEFT FOOT: ICD-10-CM

## 2022-02-08 DIAGNOSIS — Z79.4 TYPE 2 DIABETES MELLITUS WITH STAGE 3 CHRONIC KIDNEY DISEASE, WITH LONG-TERM CURRENT USE OF INSULIN, UNSPECIFIED WHETHER STAGE 3A OR 3B CKD (H): ICD-10-CM

## 2022-02-08 DIAGNOSIS — E11.22 TYPE 2 DIABETES MELLITUS WITH STAGE 3 CHRONIC KIDNEY DISEASE, WITH LONG-TERM CURRENT USE OF INSULIN, UNSPECIFIED WHETHER STAGE 3A OR 3B CKD (H): ICD-10-CM

## 2022-02-08 DIAGNOSIS — Z79.4 TYPE 2 DIABETES MELLITUS WITH STAGE 3B CHRONIC KIDNEY DISEASE, WITH LONG-TERM CURRENT USE OF INSULIN (H): Primary | ICD-10-CM

## 2022-02-08 DIAGNOSIS — N18.30 TYPE 2 DIABETES MELLITUS WITH STAGE 3 CHRONIC KIDNEY DISEASE, WITH LONG-TERM CURRENT USE OF INSULIN, UNSPECIFIED WHETHER STAGE 3A OR 3B CKD (H): ICD-10-CM

## 2022-02-08 DIAGNOSIS — N18.32 TYPE 2 DIABETES MELLITUS WITH STAGE 3B CHRONIC KIDNEY DISEASE, WITH LONG-TERM CURRENT USE OF INSULIN (H): Primary | ICD-10-CM

## 2022-02-08 DIAGNOSIS — E11.22 TYPE 2 DIABETES MELLITUS WITH STAGE 3B CHRONIC KIDNEY DISEASE, WITH LONG-TERM CURRENT USE OF INSULIN (H): Primary | ICD-10-CM

## 2022-02-08 DIAGNOSIS — Z17.0 MALIGNANT NEOPLASM OF LOWER-INNER QUADRANT OF LEFT BREAST IN FEMALE, ESTROGEN RECEPTOR POSITIVE (H): ICD-10-CM

## 2022-02-08 DIAGNOSIS — I12.0 HYPERTENSIVE CHRONIC KIDNEY DISEASE WITH STAGE 5 CHRONIC KIDNEY DISEASE OR END STAGE RENAL DISEASE (H): ICD-10-CM

## 2022-02-08 DIAGNOSIS — I20.89 ANGINA AT REST (H): ICD-10-CM

## 2022-02-08 DIAGNOSIS — I50.32 CHRONIC DIASTOLIC HEART FAILURE (H): ICD-10-CM

## 2022-02-08 DIAGNOSIS — J41.0 SIMPLE CHRONIC BRONCHITIS (H): ICD-10-CM

## 2022-02-08 DIAGNOSIS — C50.312 MALIGNANT NEOPLASM OF LOWER-INNER QUADRANT OF LEFT BREAST IN FEMALE, ESTROGEN RECEPTOR POSITIVE (H): ICD-10-CM

## 2022-02-08 DIAGNOSIS — Z91.81 PERSONAL HISTORY OF FALL: Primary | ICD-10-CM

## 2022-02-08 DIAGNOSIS — I77.9 BILATERAL CAROTID ARTERY DISEASE, UNSPECIFIED TYPE (H): ICD-10-CM

## 2022-02-08 PROCEDURE — 99215 OFFICE O/P EST HI 40 MIN: CPT | Mod: 95 | Performed by: PHYSICIAN ASSISTANT

## 2022-02-08 PROCEDURE — 99214 OFFICE O/P EST MOD 30 MIN: CPT | Mod: 95 | Performed by: NURSE PRACTITIONER

## 2022-02-08 NOTE — LETTER
2/8/2022       RE: Mariel Ribeiro  [address] [place] [address]     Dear Colleague,    Thank you for referring your patient, Mariel Ribeiro, to the [facility] [place] [facility]. Please see a copy of my visit note below.    Outcome for 02/04/22 1:19 PM: [employer] message sent  TERESITA Steiner    Outcome for 02/04/22 2:06 PM: Data uploaded on [employer]  Outcome for 02/04/22 2:06 PM: Data emailed to provider  TERESITA Steiner      Mariel Ribeiro  is being evaluated via a billable video visit.      How would you like to obtain your AVS? [employer]  For the video visit, send the invitation by: Send to e-mail at: [email or web address]  Will anyone else be joining your video visit? No            Video: 2:11 - 2:34    [facility]  Endocrinology  Muriel Will PA-C  02/08/2022      Chief Complaint:   Diabetes    History of Present Illness:   Mariel Ribeiro is a 75 year old female with a history of type II diabetes mellitus, kidney failure, TIA, CHF, CAD status post CABG, COPD, thyroid cancer status post thyroidectomy and resultant hypothyroidism, and hypertension who presents for  follow up of her diabetes.     She was diagnosed with type 2 diabetes mellitus in 2007. Initially managed with oral Metformin, Januvia, and Glimepiride, all have been discontinued due to declining renal fucntion. She was reasonably well managed with NPH 28 units bid and Prandin with meals when last seen 10/31/2019.  I last saw her as a hospital follow-up in December 2019 with BG above goal and advised increasing Lantus to 31 and if needed 33 units daily and continuing prandin with meals and advised she see CDE.    Feb 2021: Met with Mariel and roommate Odalis.   Newer eye glasses not working, concerns about cognition and referred for neurology.  Concerned about renal disease, BG at goal.  Discussed starting Empagliflozin in place of Prandin but did not.       Interim: Saw nephrology in March and they felt risk of Empag given body habitus and recurrent yeast infections outweighs benefits.   Eye exam updated.  Concern for GCA and consider temporal artery biopsy.  Negative.  ER eval for chest pain in July.      Today:  Mariel is having a lot of pain  - tail bone pain and now fell and in pain.  Has extensive bruising over her arms and face did not lose consciousness.  She continues to live with her roommate Odalis and they are having two meals daily.  Mariel has been taking her Prandin twice daily not necessarily with meals.  Time she is taking in the Prandin in the morning when she wakes up and not eating brunch for a couple of hours.  She is at times feeling symptoms with her low blood sugar including feeling shaky sweaty and hungry.  Roommate Odalis gives her juice.        Current DM therapy:  Lantus 32 units daily  Prandin/ repaglinide 1 mg twice daily with each of her meals.   - Has not been taking this way, just taking twice daily.        Blood Glucose Monitoring:          Marked improvement BG at goal.      Diabetes monitoring and complications:  CAD: Yes  Last eye exam results: mild diabetic retinopathy (12/12/2018)   Microalbuminuria: 31.53 (5/22/2019)  Neuropathy: Yes  HTN: Yes  On Statin: Yes  On Aspirin: Yes  Depression: Yes  Erectile dysfunction: N/A      PMH: reviewed - any significant changes noted above.  Meds: Reviewed and updated in module and above.  Allergies: Reviewed and where applicable updated in module.  SH: Pertinent items reviewed with patient and changes noted above.  FH: Pertinent reviewed with patient and noted above.    ROS: Brief review of symptoms reveals stable condition except where noted above.         Social History:   She lives with her roommate Odalis and her dog Rimma. Odalis is an  and still working form home.  They eat between 8 and 10 pm.    Tobacco Use: former smoker, 27 years, quit 1989  Alcohol Use: none  Drug Use:  "none  PCP: Malika Wolf      Physical Exam:   There were no vitals taken for this visit.     Wt Readings from Last 10 Encounters:   01/20/22 144.2 kg (318 lb)   01/12/22 144.2 kg (318 lb)   09/29/21 144.7 kg (319 lb)   07/15/21 143.6 kg (316 lb 8 oz)   07/09/21 144.2 kg (318 lb)   07/07/21 144.2 kg (318 lb)   06/14/21 142 kg (313 lb)   03/02/21 141 kg (310 lb 13.6 oz)   02/10/21 142 kg (313 lb)   01/26/21 142 kg (313 lb)      General: Pleasant, well nourished and hydrated obese female in NAD.  Accompanied by Odalis/    Psych:  Mood is \"good,\" affect is appropriate.  Thought form and content are fluid and coherent.    HEENT: Eyes and sclera are clear. Extraocular movements are grossly intact without proptosis.  Nares are patent, mucous membranes moist.  Neck: No masses or JVD are noted.    Resp: Easy and unlabored breathing.   Neuro: Alert and oriented, communicating clearly. She is able to recall her diabetic medications without difficulty and discuss her BG numbers in general, what her goals for them each day are.    Ext: no swelling or edema     Data:  Lab Results   Component Value Date     07/15/2021    POTASSIUM 4.4 07/15/2021    CHLORIDE 111 (H) 07/15/2021    CO2 27 07/15/2021    ANIONGAP 2 (L) 07/15/2021     (H) 07/15/2021    BUN 30 07/15/2021    CR 1.34 (H) 07/15/2021    ANTOINETTE 9.9 07/15/2021     Lab Results   Component Value Date    GFRESTIMATED 39 (L) 07/15/2021    GFRESTIMATED 40 (L) 07/09/2021    GFRESTIMATED 33 (L) 04/29/2021    GFRESTBLACK 46 (L) 07/09/2021    GFRESTBLACK 39 (L) 04/29/2021    GFRESTBLACK 44 (L) 03/18/2021      Lab Results   Component Value Date    MICROL 326 01/20/2022    UMALCR 319.61 (H) 01/20/2022        Lab Results   Component Value Date    A1C 7.0 (H) 11/23/2021    A1C 6.8 (H) 04/29/2021    A1C 6.9 (H) 06/24/2020    A1C 6.5 (H) 04/30/2019    A1C 5.2 09/10/2018    HEMOGLOBINA1 7.2 (A) 10/31/2019    HEMOGLOBINA1 6.5 (A) 04/03/2019    HEMOGLOBINA1 5.5 11/29/2018     No " results found for: CPEPT, GADAB, ISCAB    Lab Results   Component Value Date    CHOL 212 (H) 04/29/2021    CHOL 170 03/13/2019    TRIG 191 (H) 04/29/2021    TRIG 118 03/13/2019    HDL 57 04/29/2021    HDL 71 03/13/2019     (H) 04/29/2021    LDL 76 03/13/2019    NHDL 155 (H) 04/29/2021    NHDL 99 03/13/2019     TSH   Date Value Ref Range Status   04/29/2021 7.12 (H) 0.40 - 4.00 mU/L Final     Recent Labs   Lab Test 01/20/22  1500 11/23/21  1221 07/15/21  0948 07/09/21  1700 04/29/21  1116 03/18/21  1423 03/02/21  1633 11/06/19  1233 10/31/19  0000 10/30/19  1537 10/01/19  1133 04/09/19  2050 04/03/19  0000 03/21/19  1253 03/13/19  1508   A1C  --  7.0*  --   --  6.8*  --   --    < >  --   --   --    < >  --   --   --    HEMOGLOBINA1  --   --   --   --   --   --   --   --  7.2*  --   --   --  6.5*  --   --    TSH  --   --   --   --  7.12*  --   --   --   --   --  5.02*  --   --   --   --    T4  --   --   --   --  0.87  --   --   --   --   --  0.97  --   --   --   --    LDL  --   --   --   --  117*  --   --   --   --   --   --   --   --   --  76   HDL  --   --   --   --  57  --   --   --   --   --   --   --   --   --  71   TRIG  --   --   --   --  191*  --   --   --   --   --   --   --   --   --  118   CR  --   --  1.34* 1.31* 1.51*   < >  --    < >  --    < > 1.59*   < >  --    < > 1.51*   MICROL 326  --   --   --   --   --  39  --   --   --   --    < >  --   --   --     < > = values in this interval not displayed.         Assessment and Plan:  Type 2 diabetes mellitus with stage 3 chronic kidney disease, with long-term current use of insulin (H)    Mariel is a 76 year old female with type II diabetes complicated by TIA, CHF, CAD status post CABG, COPD, thyroid cancer status post thyroidectomy and resultant hypothyroidism, obesity, depression, chronic pain and hypertension.    Her diabetes is well -controlled with diet, very limited physical activity, Lantus 32 units daily, and Prandin 1 mg twice daily with each  meal.      Her blood sugars are at goal but she is having excessive hypoglycemia.    Plan:  Reduce Lantus to 29 units daily.    Continue same but take Prandin BEFORE meals and Lantus SAME TIME every day.      Please do follow-up with Dr. Nunez in nephrology.  Please have your thyroid level rechecked.  It was not in range when last checked.  Your primary provider has placed the order.    Obesity:  Weight is overall stable with her two daily meals, care with snacking diet and physical activity.  Heartily encouraged in this.    Advised try [employer] with Sabrina for seated balance and posture exercises.      HTN/CKD:  stable stage 3, with GFR stable Cr stable.  Has established care with nephrology, asked to follow-up advised to reschedule.        DM Complications-     Reviewed lifestyle modification focusing on reduction of saturated fat and trans fat; increase of dietary n-3 fatty acids, viscous fiber, and plant stanols/sterols intake; and increased physical activity recommended to improve the lipid profile and reduce the risk of developing atherosclerotic cardiovascular disease.     Retinopathy:  up to date exams  Nephropathy:  Yes, stable CKD 3.  Reschedule follow-up with Dr. Jarvis.  Given he phone number to do this  Neuropathy: Yes peripheral neuropathy.  Referred to podiatry and advised discuss visiting nurse for nail care with podiatry , PCP.    Feet: He will see podiatry  Lipids: Has CAD and is taking a statin.     Hypothyroidism:  Possibly insufficient.  Advised to recheck her TSH  Advised to be sure to take each morning 30 minutes before any food and have this level rechecked.       Follow-up: Return in 6 months    46 minutes in preparation for visit reviewing chart, labs and documentation, visiting with patient gathering history and in exam, education and counseling, as well as coordination of care, further chart review and documentation following visit on this date of service and as alluded to  documented above.  Extended time and education regarding concerns with hypoglycemia      It is my privilege to be involved in the care of the above patient.     Muriel Will PA-C, MPAS  [facility]  Diabetes, Endocrinology, and Metabolism  [phone] Appointments/Nurse  [phone] Fax  [phone] pager  [phone] nurse line

## 2022-02-08 NOTE — PROGRESS NOTES
Mariel is a 76 year old who is being evaluated via a billable video visit.      How would you like to obtain your AVS? MyChart  If the video visit is dropped, the invitation should be resent by: Text to cell phone: [phone]   Will anyone else be joining your video visit? Yes: Patria Lowry. How would they like to receive their invitation? Other e-mail: [email or web address]      Video Start Time: 11:11am    Assessment & Plan     Personal history of fall  I think patient would benefit from evaluation of other assistive services.  And concern for her functionality in her household however she has good support through her roommate.  Would like her to follow through with physical therapy and Occupational Therapy to systems her physical and cognitive function.  Would like to see if she is eligible for medical transportation to help her attend her appointments. - Care Coordination Referral; Future  - Physical Therapy Referral; Future  - Occupational Therapy Referral; Future    Bilateral low back pain, unspecified chronicity, unspecified whether sciatica present  I encouraged him to follow-up with the pain medicine again.  We discussed that given her recent fall it is likely she will have increased back pain and generalized pain.  They were given a prescription for Norco in the ER however they do have not used it.  Discussed that conservative measures with the most appropriate given her recent fall and that I would be worried any narcotics could put her at risk for another fall.  Advise she can continue Tylenol ice and heat to help with her pain.  - Physical Therapy Referral; Future    Pain of toe of left foot  No deformity noted in the toes not overly painful when her roommate palpates or moves it, however my assessment was limited by the video visit.  Would like her to follow-up with podiatry.  It is possible increased redness and swelling is due to fluid shift after fall she does have significant amount of bruising  in the left lower extremity, however not unable to rule out other etiologies without doing a neurovascular exam in person.  I advised her to wear supportive shoes rather than slippers to decrease her risk of falling and protect her feet and toes.  - Orthopedic  Referral; Future  - Physical Therapy Referral; Future    Hypertensive chronic kidney disease with stage 5 chronic kidney disease or end stage renal disease (H)   Discussed that we need to get her in for some lab work considering her worsening albuminuria.  I do see she has a lab only appointment scheduled in May and I would also like her to follow up with Nephrology  - Adult Nephrology  Referral; Future    Simple chronic bronchitis (H)  Stable.    Chronic diastolic heart failure (H)  Follows with cardiology has appointment coming up in March.    Angina at rest (H)  Follows with cardiology has appointment coming up in March.      Recurrent major depressive disorder, in partial remission (H)  Stable, no changes in medications.    Type 2 diabetes mellitus with stage 3 chronic kidney disease, with long-term current use of insulin, unspecified whether stage 3a or 3b CKD (H)  Most recent A1c was 7.  Overall stable, continue current regimen.  Has appointment with diabetes educator later today.    Bilateral carotid artery disease, unspecified type (H)  We should arrange for patient to have a carotid ultrasound and perhaps this can be done with one of her upcoming cardiology appointments if she requires imaging.  I am hopeful I can get medical transport arranged for the patient in order to attend in person exams.  We can address this at one of her future appointments.    Morbid obesity (H)  Stable, works with diabetes educator.    Malignant neoplasm of lower-inner quadrant of left breast in female, estrogen receptor positive (H)  Stable, follows with oncology.        90 minutes spent on the date of the encounter doing chart review, history and exam,  "documentation and further activities per the note           Return for Annual Wellness Visit.    CHANTAL Grace CNP  [facility]    Breana Floyd is a 76 year old who presents for the following health issues  accompanied by her Roommate.    HPI     Concern - Toe Injury left foot toe next to the big foot  Onset: Two weeks relate to the fall  Description: Pink and red puffy  Intensity: moderate  Progression of Symptoms:  same  Accompanying Signs & Symptoms: tender to touch  Previous history of similar problem: no  Precipitating factors:        Worsened by: putting shoe   Alleviating factors:        Improved by: no  Therapies tried and outcome: cold on it help a little    Fall 2 weeks ago- back still bothering. Had spinal injection through pain clinic.  Walking into the kitchen, legs gave way.  Fell flat on her face.  Was worked up in the ER, no head injury, extensive bruising throughout her body.  Hadn't taken socks off for a while.  When she finally did, noted left second toe to be red and swollen.    She has had multiple unsuccessful attempts at labs.  Community paramedic attempted to draw her blood and was unable, they were also unable to draw her labs in the ER.    She has follow-up with cardiology and with diabetes educator.      They report it is very difficult to get her out of the house to her appointments.  They feel she is at increased risk of falling.  You think the Back pain could be contributing to he issues with walking.        Review of Systems   Constitutional, HEENT, cardiovascular, pulmonary, gi and gu systems are negative, except as otherwise noted.      Objective    Vitals - Patient Reported  Weight (Patient Reported): 144.2 kg (318 lb)  Height (Patient Reported): 167.6 cm (5' 6\")  BMI (Based on Pt Reported Ht/Wt): 51.33      Vitals:  No vitals were obtained today due to virtual visit.    Physical Exam   GENERAL: Healthy, alert and no distress  EYES: Eyes " grossly normal to inspection.  No discharge or erythema, or obvious scleral/conjunctival abnormalities.  RESP: No audible wheeze, cough, or visible cyanosis.  No visible retractions or increased work of breathing.    MS: Generalized bruising on bilateral upper and lower extremities, appears old secondary to her fall.  Left second toe appears red and edematous, however it is hard to get a thorough exam however video visit.  SKIN: Visible skin clear. No significant rash, abnormal pigmentation or lesions.  NEURO: Cranial nerves grossly intact.  Mentation and speech appropriate for age.  PSYCH: Mentation appears normal, affect normal/bright, judgement and insight intact, normal speech and appearance well-groomed.                Video-Visit Details    Type of service:  Video Visit    Video End Time:1141    Originating Location (pt. Location): Home    Distant Location (provider location):  [facility]     Platform used for Video Visit: Dist[employer]

## 2022-02-08 NOTE — PROGRESS NOTES
Mariel Ribeiro  is being evaluated via a billable video visit.      How would you like to obtain your AVS? PresenceID  For the video visit, send the invitation by: Send to e-mail at: [email or web address]  Will anyone else be joining your video visit? No            Video: 2:11 - 2:34    M [facility]  Endocrinology  Muriel Will PA-C  02/08/2022      Chief Complaint:   Diabetes    History of Present Illness:   Mariel Ribeiro is a 75 year old female with a history of type II diabetes mellitus, kidney failure, TIA, CHF, CAD status post CABG, COPD, thyroid cancer status post thyroidectomy and resultant hypothyroidism, and hypertension who presents for  follow up of her diabetes.     She was diagnosed with type 2 diabetes mellitus in 2007. Initially managed with oral Metformin, Januvia, and Glimepiride, all have been discontinued due to declining renal fucntion. She was reasonably well managed with NPH 28 units bid and Prandin with meals when last seen 10/31/2019.  I last saw her as a hospital follow-up in December 2019 with BG above goal and advised increasing Lantus to 31 and if needed 33 units daily and continuing prandin with meals and advised she see CDE.    Feb 2021: Met with Mariel and roommate Odalis.   Newer eye glasses not working, concerns about cognition and referred for neurology.  Concerned about renal disease, BG at goal.  Discussed starting Empagliflozin in place of Prandin but did not.      Interim: Saw nephrology in March and they felt risk of Empag given body habitus and recurrent yeast infections outweighs benefits.   Eye exam updated.  Concern for GCA and consider temporal artery biopsy.  Negative.  ER eval for chest pain in July.      Today:  Mariel is having a lot of pain  - tail bone pain and now fell and in pain.  Has extensive bruising over her arms and face did not lose consciousness.  She continues to live with her roommate Odalis and they are having two meals daily.  Mariel has been taking her  Prandin twice daily not necessarily with meals.  Time she is taking in the Prandin in the morning when she wakes up and not eating brunch for a couple of hours.  She is at times feeling symptoms with her low blood sugar including feeling shaky sweaty and hungry.  Roommate Odalis gives her juice.        Current DM therapy:  Lantus 32 units daily  Prandin/ repaglinide 1 mg twice daily with each of her meals.   - Has not been taking this way, just taking twice daily.        Blood Glucose Monitoring:          Marked improvement BG at goal.      Diabetes monitoring and complications:  CAD: Yes  Last eye exam results: mild diabetic retinopathy (12/12/2018)   Microalbuminuria: 31.53 (5/22/2019)  Neuropathy: Yes  HTN: Yes  On Statin: Yes  On Aspirin: Yes  Depression: Yes  Erectile dysfunction: N/A      PMH: reviewed - any significant changes noted above.  Meds: Reviewed and updated in module and above.  Allergies: Reviewed and where applicable updated in module.  SH: Pertinent items reviewed with patient and changes noted above.  FH: Pertinent reviewed with patient and noted above.    ROS: Brief review of symptoms reveals stable condition except where noted above.         Social History:   She lives with her roommate Odalis and her dog Rimma. Odalis is an  and still working form home.  They eat between 8 and 10 pm.    Tobacco Use: former smoker, 27 years, quit 1989  Alcohol Use: none  Drug Use: none  PCP: Malika Wolf      Physical Exam:   There were no vitals taken for this visit.     Wt Readings from Last 10 Encounters:   01/20/22 144.2 kg (318 lb)   01/12/22 144.2 kg (318 lb)   09/29/21 144.7 kg (319 lb)   07/15/21 143.6 kg (316 lb 8 oz)   07/09/21 144.2 kg (318 lb)   07/07/21 144.2 kg (318 lb)   06/14/21 142 kg (313 lb)   03/02/21 141 kg (310 lb 13.6 oz)   02/10/21 142 kg (313 lb)   01/26/21 142 kg (313 lb)      General: Pleasant, well nourished and hydrated obese female in NAD.  Accompanied by Odalis/    Psych:   "Mood is \"good,\" affect is appropriate.  Thought form and content are fluid and coherent.    HEENT: Eyes and sclera are clear. Extraocular movements are grossly intact without proptosis.  Nares are patent, mucous membranes moist.  Neck: No masses or JVD are noted.    Resp: Easy and unlabored breathing.   Neuro: Alert and oriented, communicating clearly. She is able to recall her diabetic medications without difficulty and discuss her BG numbers in general, what her goals for them each day are.    Ext: no swelling or edema     Data:  Lab Results   Component Value Date     07/15/2021    POTASSIUM 4.4 07/15/2021    CHLORIDE 111 (H) 07/15/2021    CO2 27 07/15/2021    ANIONGAP 2 (L) 07/15/2021     (H) 07/15/2021    BUN 30 07/15/2021    CR 1.34 (H) 07/15/2021    ANTOINETTE 9.9 07/15/2021     Lab Results   Component Value Date    GFRESTIMATED 39 (L) 07/15/2021    GFRESTIMATED 40 (L) 07/09/2021    GFRESTIMATED 33 (L) 04/29/2021    GFRESTBLACK 46 (L) 07/09/2021    GFRESTBLACK 39 (L) 04/29/2021    GFRESTBLACK 44 (L) 03/18/2021      Lab Results   Component Value Date    MICROL 326 01/20/2022    UMALCR 319.61 (H) 01/20/2022        Lab Results   Component Value Date    A1C 7.0 (H) 11/23/2021    A1C 6.8 (H) 04/29/2021    A1C 6.9 (H) 06/24/2020    A1C 6.5 (H) 04/30/2019    A1C 5.2 09/10/2018    HEMOGLOBINA1 7.2 (A) 10/31/2019    HEMOGLOBINA1 6.5 (A) 04/03/2019    HEMOGLOBINA1 5.5 11/29/2018     No results found for: CPEPT, GADAB, ISCAB    Lab Results   Component Value Date    CHOL 212 (H) 04/29/2021    CHOL 170 03/13/2019    TRIG 191 (H) 04/29/2021    TRIG 118 03/13/2019    HDL 57 04/29/2021    HDL 71 03/13/2019     (H) 04/29/2021    LDL 76 03/13/2019    NHDL 155 (H) 04/29/2021    NHDL 99 03/13/2019     TSH   Date Value Ref Range Status   04/29/2021 7.12 (H) 0.40 - 4.00 mU/L Final     Recent Labs   Lab Test 01/20/22  1500 11/23/21  1221 07/15/21  0948 07/09/21  1700 04/29/21  1116 03/18/21  1423 03/02/21  1633 " 11/06/19  1233 10/31/19  0000 10/30/19  1537 10/01/19  1133 04/09/19 2050 04/03/19  0000 03/21/19  1253 03/13/19  1508   A1C  --  7.0*  --   --  6.8*  --   --    < >  --   --   --    < >  --   --   --    HEMOGLOBINA1  --   --   --   --   --   --   --   --  7.2*  --   --   --  6.5*  --   --    TSH  --   --   --   --  7.12*  --   --   --   --   --  5.02*  --   --   --   --    T4  --   --   --   --  0.87  --   --   --   --   --  0.97  --   --   --   --    LDL  --   --   --   --  117*  --   --   --   --   --   --   --   --   --  76   HDL  --   --   --   --  57  --   --   --   --   --   --   --   --   --  71   TRIG  --   --   --   --  191*  --   --   --   --   --   --   --   --   --  118   CR  --   --  1.34* 1.31* 1.51*   < >  --    < >  --    < > 1.59*   < >  --    < > 1.51*   MICROL 326  --   --   --   --   --  39  --   --   --   --    < >  --   --   --     < > = values in this interval not displayed.         Assessment and Plan:  Type 2 diabetes mellitus with stage 3 chronic kidney disease, with long-term current use of insulin (H)    Mariel is a 76 year old female with type II diabetes complicated by TIA, CHF, CAD status post CABG, COPD, thyroid cancer status post thyroidectomy and resultant hypothyroidism, obesity, depression, chronic pain and hypertension.    Her diabetes is well -controlled with diet, very limited physical activity, Lantus 32 units daily, and Prandin 1 mg twice daily with each meal.      Her blood sugars are at goal but she is having excessive hypoglycemia.    Plan:  Reduce Lantus to 29 units daily.    Continue same but take Prandin BEFORE meals and Lantus SAME TIME every day.      Please do follow-up with Dr. Nunez in nephrology.  Please have your thyroid level rechecked.  It was not in range when last checked.  Your primary provider has placed the order.    Obesity:  Weight is overall stable with her two daily meals, care with snacking diet and physical activity.  Heartily encouraged in this.     Advised try [employer] with Sabrina for seated balance and posture exercises.      HTN/CKD:  stable stage 3, with GFR stable Cr stable.  Has established care with nephrology, asked to follow-up advised to reschedule.        DM Complications-     Reviewed lifestyle modification focusing on reduction of saturated fat and trans fat; increase of dietary n-3 fatty acids, viscous fiber, and plant stanols/sterols intake; and increased physical activity recommended to improve the lipid profile and reduce the risk of developing atherosclerotic cardiovascular disease.     Retinopathy:  up to date exams  Nephropathy:  Yes, stable CKD 3.  Reschedule follow-up with Dr. Jarvis.  Given he phone number to do this  Neuropathy: Yes peripheral neuropathy.  Referred to podiatry and advised discuss visiting nurse for nail care with podiatry , PCP.    Feet: He will see podiatry  Lipids: Has CAD and is taking a statin.     Hypothyroidism:  Possibly insufficient.  Advised to recheck her TSH  Advised to be sure to take each morning 30 minutes before any food and have this level rechecked.       Follow-up: Return in 6 months    46 minutes in preparation for visit reviewing chart, labs and documentation, visiting with patient gathering history and in exam, education and counseling, as well as coordination of care, further chart review and documentation following visit on this date of service and as alluded to documented above.  Extended time and education regarding concerns with hypoglycemia      It is my privilege to be involved in the care of the above patient.     Muriel Will PA-C, MPAS  [facility]  Diabetes, Endocrinology, and Metabolism  [phone] Appointments/Nurse  [phone] Fax  [phone] pager  [phone] nurse line

## 2022-02-08 NOTE — PATIENT INSTRUCTIONS
Dear Mariel,  Please reduce your Lantus to 29 units daily to avoid low blood sugars.  Please do follow-up with Dr. Nunez in nephrology.  Please have your thyroid level rechecked.  It was not in range when last checked.  Your primary provider has placed the order.  My best wishes,    Muriel Will PA-C, MPAS  [facility]  Diabetes, Endocrinology, and Metabolism  [phone] Appointments/Nurse  [phone] Fax  [phone] URGENTafter hours/weekend Endocrinologist on call

## 2022-02-08 NOTE — NURSING NOTE
Pt states there are no changes to their allergy and medication list since last reviewed on 1/31/22. Carlene Watters on 2/8/2022 at 1:46 PM

## 2022-02-09 ENCOUNTER — PATIENT OUTREACH (OUTPATIENT)
Dept: CARDIOLOGY | Facility: CLINIC | Age: 77
End: 2022-02-09
Payer: MEDICARE

## 2022-02-09 ENCOUNTER — PATIENT OUTREACH (OUTPATIENT)
Dept: NURSING | Facility: CLINIC | Age: 77
End: 2022-02-09
Payer: MEDICARE

## 2022-02-09 ASSESSMENT — ACTIVITIES OF DAILY LIVING (ADL): DEPENDENT_IADLS:: SHOPPING

## 2022-02-09 NOTE — TELEPHONE ENCOUNTER
[facility] - CardioMEMS Reading Review    February 9, 2022   CardioMEMS reviewed.  Current diuretic: Bumex 4 mg daily  Recent plan of care changes:  None. Most recent PAD in goal range.     Current Threshold parameters:       Today's Waveform:       Readings:           RHC Date Wedge Pressure MEMS PAD during cath  (average of the 3 values)     7/1/2019 w/MEMS implant     20 mmHg   18 mmHg           Rosina Mays RN

## 2022-02-09 NOTE — PROGRESS NOTES
Clinic Care Coordination Contact    Follow Up Progress Note      Assessment:     Assessment completed with patient's roommate, Odalis.     Odalis shares that she and patient would like PT and OT in the home.     Odalis shares it is difficult for patient to leave the home and she is a fall risk with many falls in the past year. Patient also has significant pain which affects her ability to walk safely.     Care Gaps:    Health Maintenance Due   Topic Date Due     MEDICARE ANNUAL WELLNESS VISIT  08/10/2017     DIABETIC FOOT EXAM  03/19/2019     HF ACTION PLAN  11/30/2021     BMP  01/15/2022     HEMOGLOBIN  01/15/2022       Postponed to next outreach due to focusing on home care at this time.      Goals addressed this encounter:   Goals Addressed                    This Visit's Progress       Home Care (pt-stated)   10%      Goal Statement: I will get set up with home care for physical and occupational therapy in the next 1 month.   Date goal set: 2/9/22  Measure of Success: Enrollment in home care  Barriers:None identified  Strengths:Strong support system  Date to Achieve By:3/9/22  Patient expressed understanding of goal:yes    Action steps to achieve this goal  1. I will accept home care services.   2. I will accept the help of Care Coordination to find a home care agency.          COMPLETED: Monitoring (pt-stated)         Goal Statement: In the next three months, I will check my blood sugar, weight, blood pressure, pulse and oxygen level daily.   Date Goal set: 3/19/2021  Barriers: None identified   Strengths:Motivated   Date to Achieve By: 7/19/2021  Patient expressed understanding of goal: Yes    Action steps to achieve this goal:  1. I will call the clinic if my blood pressure is consistently over 140/90, my oxygen is consistently below 90%, my pulse is elevated or my blood sugar is in the abnormal range (Normal range ).  2. I will continue to focus on eating 3 healthy meals every day.  3. I will schedule and  attend future specialty and primary care provider appointments.   4. I will give the CHW updates at outreach calls.    Updated: 4/12/21            Intervention/Education provided during outreach:     Writer shared that writer will begin to search for home care agencies but it may take up to 3 weeks to find placement. Odalis verbalizes understanding.      Outreach Frequency: monthly    Plan: Writer will route to PCP and request home care order. Writer will search for home care agencies and follow up with Odalis 2 weeks.     Care Coordinator will follow up in 2 weeks.     Peggy Raines, RN Care Coordinator     [facility] Ambulatory Care Management  [facility] Family and OB  [email or web address]    Office: [phone]

## 2022-02-09 NOTE — PROGRESS NOTES
Remote monitoring of Pulmonary Artery Pressures via CardioMEMS device reviewed at least weekly and as needed with CORE nursing. Diuretics adjusted accordingly.     billing dates of 1/4/22 through 2/2/22    [facility]

## 2022-02-10 DIAGNOSIS — Z91.81 PERSONAL HISTORY OF FALL: ICD-10-CM

## 2022-02-10 DIAGNOSIS — I50.32 CHRONIC DIASTOLIC HEART FAILURE (H): Primary | ICD-10-CM

## 2022-02-10 DIAGNOSIS — M54.50 BILATERAL LOW BACK PAIN, UNSPECIFIED CHRONICITY, UNSPECIFIED WHETHER SCIATICA PRESENT: ICD-10-CM

## 2022-02-14 ENCOUNTER — PATIENT OUTREACH (OUTPATIENT)
Dept: NURSING | Facility: CLINIC | Age: 77
End: 2022-02-14
Payer: MEDICARE

## 2022-02-14 ASSESSMENT — ACTIVITIES OF DAILY LIVING (ADL): DEPENDENT_IADLS:: SHOPPING

## 2022-02-14 NOTE — PROGRESS NOTES
Clinic Care Coordination Contact      [facility]: Referral faxed to [phone]    [facility]: Left voicemail and requested call back.     [facility] [phone]: Not able to take patient on due to capacity.     [facility] [phone]: Left message and asked for call back.     Peggy Raines, RN Care Coordinator     [facility] Ambulatory Care Management  [facility] Family and OB  [email or web address]    Office: [phone]

## 2022-02-15 ENCOUNTER — PATIENT OUTREACH (OUTPATIENT)
Dept: NURSING | Facility: CLINIC | Age: 77
End: 2022-02-15
Payer: MEDICARE

## 2022-02-15 ASSESSMENT — ACTIVITIES OF DAILY LIVING (ADL): DEPENDENT_IADLS:: SHOPPING

## 2022-02-15 NOTE — PROGRESS NOTES
Clinic Care Coordination Contact    Writer reached out to patient's friend and caregiver, Odalis, to follow up on home care. Odalis shares she had already heard from [facility] and they will be doing patient's Start of Care visit on Thursday of this week.   Writer had recently spoke to [facility] who also shared they could see patient this Thursday.   Writer will call [facility] and cancel referral.   Writer confirmed with [facility] that they are able to take patient on.   Writer will follow up in one month.     Peggy Raines, RN Care Coordinator     [facility] Ambulatory Care Management  [facility] Family and OB  [email or web address]    Office: [phone]

## 2022-02-17 ENCOUNTER — PATIENT OUTREACH (OUTPATIENT)
Dept: CARDIOLOGY | Facility: CLINIC | Age: 77
End: 2022-02-17
Payer: MEDICARE

## 2022-02-17 NOTE — TELEPHONE ENCOUNTER
[facility] - CardioMEMS Reading Review    February 17, 2022   CardioMEMS reviewed.  Current diuretic: Bumex 4 mg daily  Recent plan of care changes: none. PAD in goal range.     Current Threshold parameters:       Today's Waveform:       Readings:           RHC Date Wedge Pressure MEMS PAD during cath  (average of the 3 values)     7/1/2019 w/MEMS implant     20 mmHg   18 mmHg           Rosina Mays RN

## 2022-02-20 DIAGNOSIS — Z79.4 TYPE 2 DIABETES MELLITUS WITH HYPERGLYCEMIA, WITH LONG-TERM CURRENT USE OF INSULIN (H): ICD-10-CM

## 2022-02-20 DIAGNOSIS — E11.65 TYPE 2 DIABETES MELLITUS WITH HYPERGLYCEMIA, WITH LONG-TERM CURRENT USE OF INSULIN (H): ICD-10-CM

## 2022-02-21 ENCOUNTER — ALLIED HEALTH/NURSE VISIT (OUTPATIENT)
Dept: OTHER | Facility: CLINIC | Age: 77
End: 2022-02-21
Payer: MEDICARE

## 2022-02-21 VITALS
WEIGHT: 293 LBS | DIASTOLIC BLOOD PRESSURE: 82 MMHG | SYSTOLIC BLOOD PRESSURE: 140 MMHG | TEMPERATURE: 97.8 F | HEART RATE: 62 BPM | BODY MASS INDEX: 49.96 KG/M2 | OXYGEN SATURATION: 95 %

## 2022-02-21 DIAGNOSIS — R53.83 OTHER FATIGUE: Primary | ICD-10-CM

## 2022-02-21 PROCEDURE — 99207 PR COMMUNITY PARAMEDIC - PATIENT NOT BILLABLE: CPT

## 2022-02-21 NOTE — PROGRESS NOTES
Community [facility]amedics Follow-up Visit  February 21, 2022  TIME: 1200    Mariel Ribeiro is a 76 year old female being seen at home for a follow-up visit.    Present at appointment:           Chief Complaint   Patient presents with     Outreach          Utilization    Hospital Admissions  0             ED Visits  2             No Show Count (past year)  0                Current as of: 2/17/2022 10:31 AM              Pulse 62   SpO2 95%     Clinical Concerns:  Current Medical Concerns:  Unsteady gait, memory loss    Current Behavioral Concerns: none identified    Education Provided to patient: no   Medication set up? No  Pill Box issued: No  Scale issued: No  Flu Shot given: No  COVID Vaccine Given: No  Lab draw or specimen collection: No  Food box issued: No  Collaborative visit with PCP: No  Wound Care: No    Health Maintenance Reviewed:      Clinical Pathway: None    No  Face to Face in Home / Community    Recent blood sugars: 188 fasting    Review of Symptoms/PE    Skin: bruising  Eyes: visual blurring  Ears/Nose/Throat: negative  Respiratory: No shortness of breath, dyspnea on exertion, cough, or hemoptysis  Cardiovascular: negative  Gastrointestinal: negative  Genitourinary: negative  Musculoskeletal: back pain  Neurologic: headaches, numbness or tingling of hands, numbness or tingling of feet and memory problems  Psychiatric: negative    Pain Management::                 Plan:     Time spent with patient: 60    The patient meets one or more of the following criteria:  * Requires services to prevent readmission to a nursing home or hospital      Next CP visit scheduled: 3/21/22    Issues for Provider to follow up on: Mariel complains that she is still having headaches on a regular basis.  She will use a heat pack while in bed which does provide some relief.  She also reports issues with her memory which I witnessed during out appointment.  She will be in the middle of a sentence and stop speaking because she lost  her thought.  No recent falls.     Provider follow up visit needed: Multiple upcoming.

## 2022-02-22 RX ORDER — INSULIN GLARGINE 100 [IU]/ML
INJECTION, SOLUTION SUBCUTANEOUS
Qty: 15 ML | Refills: 1 | Status: SHIPPED | OUTPATIENT
Start: 2022-02-22 | End: 2022-06-20

## 2022-02-22 NOTE — TELEPHONE ENCOUNTER
Prescription approved per [facility] Refill Protocol.  SONIA RobertsN, RN  [facility]

## 2022-02-23 ENCOUNTER — PATIENT OUTREACH (OUTPATIENT)
Dept: CARDIOLOGY | Facility: CLINIC | Age: 77
End: 2022-02-23
Payer: MEDICARE

## 2022-02-23 NOTE — TELEPHONE ENCOUNTER
[facility] - CardioMEMS Reading Review    February 23, 2022   CardioMEMS reviewed.  Current diuretic: Bumex 4 mg daily  Recent plan of care changes: none.     Current Threshold parameters:       Today's Waveform:       Readings:           RHC Date Wedge Pressure MEMS PAD during cath  (average of the 3 values)     7/1/2019 w/MEMS implant     20 mmHg   18 mmHg           Rosina Mays RN

## 2022-02-24 ENCOUNTER — TELEPHONE (OUTPATIENT)
Dept: FAMILY MEDICINE | Facility: CLINIC | Age: 77
End: 2022-02-24
Payer: MEDICARE

## 2022-02-24 NOTE — TELEPHONE ENCOUNTER
Writer called patient, spoke with Odalis, who stated patient currently sleeping.    Consent to communicate with Odalis on file.    Odalis informed writer she is not aware patient is experiencing symptoms after the fall and will ask patient to call triage back.    Clinic number reviewed with Odalis.    SONIA RobertsN, RN  [facility]

## 2022-02-24 NOTE — TELEPHONE ENCOUNTER
[facility] Call Center    Phone Message    May a detailed message be left on voicemail: no     Reason for Call: Other: PT Zandra reported that Pt, Mariel fell from her bed last night and is experiencing back pain and headache. She's also had jerking motions in her legs for the last month which contributed to the fall. She was down for an hour but was able to get back in the bed. Please call Pt, [phone]. Thank you.     Action Taken: Message routed to:  [facility]    Travel Screening: Not Applicable

## 2022-02-27 DIAGNOSIS — I50.32 CHRONIC DIASTOLIC HEART FAILURE (H): Chronic | ICD-10-CM

## 2022-02-27 DIAGNOSIS — E03.8 OTHER SPECIFIED HYPOTHYROIDISM: ICD-10-CM

## 2022-03-01 RX ORDER — LEVOTHYROXINE SODIUM 150 MCG
TABLET ORAL
Qty: 90 TABLET | Refills: 0 | OUTPATIENT
Start: 2022-03-01

## 2022-03-01 NOTE — TELEPHONE ENCOUNTER
Routing refill request to provider for review/approval because:  Rimma given x1 and patient did not follow up, please advise  Labs out of range:  TSH - future order is in chart    Last Written Prescription Date:  12/6/21  Last Fill Quantity: 90,  # refills: 0   Last office visit: 11/23/2021 with prescribing provider:     Future Office Visit:   Next 5 appointments (look out 90 days)    Mar 28, 2022  2:00 PM  (Arrive by 1:45 PM)  Return Visit with Opal Weber MD  [facility] ([facility] ) [address] [place] [id number] [phone]

## 2022-03-02 RX ORDER — LOSARTAN POTASSIUM 50 MG/1
TABLET ORAL
Qty: 45 TABLET | Refills: 0 | Status: SHIPPED | OUTPATIENT
Start: 2022-03-02 | End: 2022-10-17

## 2022-03-02 NOTE — TELEPHONE ENCOUNTER
LCV:7/15/2021  [facility]  BP above protocol parameter- FYI to clinic  Abnormal Cr- reviewed by provider  NCV: 3-16-22  RF 90 day      BP Readings from Last 3 Encounters:   02/21/22 (!) 140/82   01/31/22 122/78   01/28/22 (!) 169/92

## 2022-03-03 DIAGNOSIS — D05.12 DUCTAL CARCINOMA IN SITU (DCIS) OF LEFT BREAST: ICD-10-CM

## 2022-03-03 RX ORDER — ANASTROZOLE 1 MG/1
1 TABLET ORAL DAILY
Qty: 90 TABLET | Refills: 3 | Status: SHIPPED | OUTPATIENT
Start: 2022-03-03 | End: 2023-03-14

## 2022-03-03 NOTE — TELEPHONE ENCOUNTER
Medication: Anastrozole  Last prescribing provider: Dr. Weber    Last clinic visit date: 9/28/21    Any missed appointments or no-shows since last clinic visit?: No    Recommendations for requested medication (if none, N/A): NA    Next clinic visit date: 3/28/22    Any other pertinent information (if none, N/A): Per Dr. Weber's note on 9/28/21: We reviewed that DCIS has an approximate 15-30% chance of developing into an invasive carcinoma. There is no evidence of progression on mammogram images reviewed today. There is also no evidence of progression on clinical exam. She continues to decline surgery. Plan is therefore to continue anastrozole and continue once every 6 month diagnostic mammogram for surveillance. I would like to continue to see her in person for serial breast examinations as well.      Pended and Routed to Provider.

## 2022-03-04 ENCOUNTER — CARE COORDINATION (OUTPATIENT)
Dept: CARDIOLOGY | Facility: CLINIC | Age: 77
End: 2022-03-04
Payer: MEDICARE

## 2022-03-04 ENCOUNTER — ALLIED HEALTH/NURSE VISIT (OUTPATIENT)
Dept: CARDIOLOGY | Facility: CLINIC | Age: 77
End: 2022-03-04
Attending: NURSE PRACTITIONER
Payer: MEDICARE

## 2022-03-04 DIAGNOSIS — I50.32 CHRONIC DIASTOLIC HEART FAILURE (H): Primary | ICD-10-CM

## 2022-03-04 PROCEDURE — 93264 REM MNTR WRLS P-ART PRS SNR: CPT | Performed by: NURSE PRACTITIONER

## 2022-03-04 NOTE — PROGRESS NOTES
[facility] - CardioMEMS Reading Review    March 4, 2022   CardioMEMS reviewed.  Current diuretic: Bumex 4 mg daily  Recent plan of care changes: no changes. Most recent PAD in goal range.     Current Threshold parameters:       Today's Waveform:       Readings:           RH Date Wedge Pressure MEMS PAD during cath  (average of the 3 values)     7/1/2019 w/MEMS implant     20 mmHg   18 mmHg           Rosina Mays RN

## 2022-03-07 NOTE — PROGRESS NOTES
Remote Monitoring complete for billing period 2/3/22 - 3/4/22. PAD overall in goal range. See encounters from following dates:    2022  2022  2022  3/4/2022  Rosina Mays, RN [facility]  CORE Nurse Coordinator  Questions and [phone] Option #1

## 2022-03-08 ENCOUNTER — DOCUMENTATION ONLY (OUTPATIENT)
Dept: FAMILY MEDICINE | Facility: CLINIC | Age: 77
End: 2022-03-08
Payer: MEDICARE

## 2022-03-08 DIAGNOSIS — Z53.9 DIAGNOSIS NOT YET DEFINED: Primary | ICD-10-CM

## 2022-03-08 PROCEDURE — 99207 PR MD CERTIFICATION HHA PATIENT: CPT | Performed by: NURSE PRACTITIONER

## 2022-03-09 ENCOUNTER — LAB (OUTPATIENT)
Dept: LAB | Facility: CLINIC | Age: 77
End: 2022-03-09
Payer: MEDICARE

## 2022-03-09 ENCOUNTER — CARE COORDINATION (OUTPATIENT)
Dept: CARDIOLOGY | Facility: CLINIC | Age: 77
End: 2022-03-09
Payer: MEDICARE

## 2022-03-09 ENCOUNTER — ANCILLARY PROCEDURE (OUTPATIENT)
Dept: CARDIOLOGY | Facility: CLINIC | Age: 77
End: 2022-03-09
Attending: INTERNAL MEDICINE
Payer: MEDICARE

## 2022-03-09 DIAGNOSIS — I50.32 CHRONIC DIASTOLIC HEART FAILURE (H): ICD-10-CM

## 2022-03-09 DIAGNOSIS — I50.32 CHRONIC DIASTOLIC HEART FAILURE (H): Chronic | ICD-10-CM

## 2022-03-09 DIAGNOSIS — N18.30 TYPE 2 DIABETES MELLITUS WITH STAGE 3 CHRONIC KIDNEY DISEASE, WITH LONG-TERM CURRENT USE OF INSULIN, UNSPECIFIED WHETHER STAGE 3A OR 3B CKD (H): Chronic | ICD-10-CM

## 2022-03-09 DIAGNOSIS — E55.9 VITAMIN D DEFICIENCY: ICD-10-CM

## 2022-03-09 DIAGNOSIS — E11.22 TYPE 2 DIABETES MELLITUS WITH STAGE 3 CHRONIC KIDNEY DISEASE, WITH LONG-TERM CURRENT USE OF INSULIN, UNSPECIFIED WHETHER STAGE 3A OR 3B CKD (H): Chronic | ICD-10-CM

## 2022-03-09 DIAGNOSIS — E78.5 HYPERLIPIDEMIA WITH TARGET LDL LESS THAN 70: Chronic | ICD-10-CM

## 2022-03-09 DIAGNOSIS — Z79.4 TYPE 2 DIABETES MELLITUS WITH STAGE 3 CHRONIC KIDNEY DISEASE, WITH LONG-TERM CURRENT USE OF INSULIN, UNSPECIFIED WHETHER STAGE 3A OR 3B CKD (H): Chronic | ICD-10-CM

## 2022-03-09 DIAGNOSIS — E89.0 POSTOPERATIVE HYPOTHYROIDISM: Chronic | ICD-10-CM

## 2022-03-09 LAB
ALBUMIN SERPL-MCNC: 3.4 G/DL (ref 3.4–5)
ALP SERPL-CCNC: 109 U/L (ref 40–150)
ALT SERPL W P-5'-P-CCNC: 24 U/L (ref 0–50)
ANION GAP SERPL CALCULATED.3IONS-SCNC: 6 MMOL/L (ref 3–14)
AST SERPL W P-5'-P-CCNC: 19 U/L (ref 0–45)
BILIRUB SERPL-MCNC: 0.5 MG/DL (ref 0.2–1.3)
BUN SERPL-MCNC: 24 MG/DL (ref 7–30)
CALCIUM SERPL-MCNC: 9.8 MG/DL (ref 8.5–10.1)
CHLORIDE BLD-SCNC: 106 MMOL/L (ref 94–109)
CHOLEST SERPL-MCNC: 172 MG/DL
CO2 SERPL-SCNC: 30 MMOL/L (ref 20–32)
CREAT SERPL-MCNC: 1.37 MG/DL (ref 0.52–1.04)
ERYTHROCYTE [DISTWIDTH] IN BLOOD BY AUTOMATED COUNT: 13.2 % (ref 10–15)
FASTING STATUS PATIENT QL REPORTED: YES
GFR SERPL CREATININE-BSD FRML MDRD: 40 ML/MIN/1.73M2
GLUCOSE BLD-MCNC: 141 MG/DL (ref 70–99)
HBA1C MFR BLD: 6.8 % (ref 0–5.6)
HCT VFR BLD AUTO: 39.4 % (ref 35–47)
HDLC SERPL-MCNC: 57 MG/DL
HGB BLD-MCNC: 12.7 G/DL (ref 11.7–15.7)
LDLC SERPL CALC-MCNC: 89 MG/DL
LVEF ECHO: NORMAL
MAGNESIUM SERPL-MCNC: 1.8 MG/DL (ref 1.6–2.3)
MCH RBC QN AUTO: 28.8 PG (ref 26.5–33)
MCHC RBC AUTO-ENTMCNC: 32.2 G/DL (ref 31.5–36.5)
MCV RBC AUTO: 89 FL (ref 78–100)
NONHDLC SERPL-MCNC: 115 MG/DL
PLATELET # BLD AUTO: 106 10E3/UL (ref 150–450)
POTASSIUM BLD-SCNC: 3.9 MMOL/L (ref 3.4–5.3)
PROT SERPL-MCNC: 6.4 G/DL (ref 6.8–8.8)
RBC # BLD AUTO: 4.41 10E6/UL (ref 3.8–5.2)
SODIUM SERPL-SCNC: 142 MMOL/L (ref 133–144)
TRIGL SERPL-MCNC: 131 MG/DL
TSH SERPL DL<=0.005 MIU/L-ACNC: 3.56 MU/L (ref 0.4–4)
WBC # BLD AUTO: 5.6 10E3/UL (ref 4–11)

## 2022-03-09 PROCEDURE — 80061 LIPID PANEL: CPT | Performed by: PATHOLOGY

## 2022-03-09 PROCEDURE — 83735 ASSAY OF MAGNESIUM: CPT | Performed by: PATHOLOGY

## 2022-03-09 PROCEDURE — 80053 COMPREHEN METABOLIC PANEL: CPT | Performed by: PATHOLOGY

## 2022-03-09 PROCEDURE — 36415 COLL VENOUS BLD VENIPUNCTURE: CPT | Performed by: PATHOLOGY

## 2022-03-09 PROCEDURE — 99207 PR STATISTIC IV PUSH SINGLE INITIAL SUBSTANCE: CPT | Performed by: INTERNAL MEDICINE

## 2022-03-09 PROCEDURE — 83036 HEMOGLOBIN GLYCOSYLATED A1C: CPT | Performed by: PATHOLOGY

## 2022-03-09 PROCEDURE — 82306 VITAMIN D 25 HYDROXY: CPT | Performed by: FAMILY MEDICINE

## 2022-03-09 PROCEDURE — 84443 ASSAY THYROID STIM HORMONE: CPT | Performed by: PATHOLOGY

## 2022-03-09 PROCEDURE — 85027 COMPLETE CBC AUTOMATED: CPT | Performed by: PATHOLOGY

## 2022-03-09 PROCEDURE — 93306 TTE W/DOPPLER COMPLETE: CPT | Performed by: INTERNAL MEDICINE

## 2022-03-09 RX ADMIN — Medication 6 ML: at 14:45

## 2022-03-09 NOTE — PROGRESS NOTES
Remote monitoring of Pulmonary Artery Pressures via CardioMEMS device reviewed at least weekly and as needed with CORE nursing. Diuretics adjusted accordingly.    billing dates of 2/2/2022 through 3/4/2022      [place] [facility]

## 2022-03-09 NOTE — PROGRESS NOTES
[facility] - CardioMEMS Reading Review    March 9, 2022   CardioMEMS reviewed.  Current diuretic: Bumex 4 mg daily  Recent plan of care changes: No changes. PAD in goal range.     Current Threshold parameters:       Today's Waveform:     Readings:           RHC Date Wedge Pressure MEMS PAD during cath  (average of the 3 values)     7/1/2019 w/MEMS implant     20 mmHg   18 mmHg           Rosina Mays RN

## 2022-03-10 LAB — DEPRECATED CALCIDIOL+CALCIFEROL SERPL-MC: 45 UG/L (ref 20–75)

## 2022-03-14 DIAGNOSIS — E03.8 OTHER SPECIFIED HYPOTHYROIDISM: ICD-10-CM

## 2022-03-14 RX ORDER — LEVOTHYROXINE SODIUM 150 UG/1
150 TABLET ORAL DAILY
Qty: 90 TABLET | Refills: 2 | Status: SHIPPED | OUTPATIENT
Start: 2022-03-14 | End: 2023-01-03
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
## 2022-03-14 NOTE — TELEPHONE ENCOUNTER
Patient almost out of medication- see 12/4/21 Miryam encounter that pt messaged in 3/14/22    SONIA AlbertoN RN  [facility]

## 2022-03-14 NOTE — TELEPHONE ENCOUNTER
Prescription approved per [facility] Refill Protocol.  SONIA RobertsN, RN  [facility]
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
## 2022-03-16 ENCOUNTER — VIRTUAL VISIT (OUTPATIENT)
Dept: NEPHROLOGY | Facility: CLINIC | Age: 77
End: 2022-03-16
Attending: STUDENT IN AN ORGANIZED HEALTH CARE EDUCATION/TRAINING PROGRAM
Payer: MEDICARE

## 2022-03-16 ENCOUNTER — VIRTUAL VISIT (OUTPATIENT)
Dept: CARDIOLOGY | Facility: CLINIC | Age: 77
End: 2022-03-16
Attending: INTERNAL MEDICINE
Payer: MEDICARE

## 2022-03-16 DIAGNOSIS — I50.32 CHRONIC DIASTOLIC HEART FAILURE (H): Chronic | ICD-10-CM

## 2022-03-16 DIAGNOSIS — Z79.4 TYPE 2 DIABETES MELLITUS WITH STAGE 3 CHRONIC KIDNEY DISEASE, WITH LONG-TERM CURRENT USE OF INSULIN, UNSPECIFIED WHETHER STAGE 3A OR 3B CKD (H): Chronic | ICD-10-CM

## 2022-03-16 DIAGNOSIS — J44.9 CHRONIC OBSTRUCTIVE PULMONARY DISEASE, UNSPECIFIED COPD TYPE (H): Chronic | ICD-10-CM

## 2022-03-16 DIAGNOSIS — I50.32 CHRONIC DIASTOLIC HEART FAILURE (H): Primary | ICD-10-CM

## 2022-03-16 DIAGNOSIS — I25.10 ATHEROSCLEROSIS OF NATIVE CORONARY ARTERY WITHOUT ANGINA PECTORIS, UNSPECIFIED WHETHER NATIVE OR TRANSPLANTED HEART: Chronic | ICD-10-CM

## 2022-03-16 DIAGNOSIS — E66.01 MORBID OBESITY (H): Chronic | ICD-10-CM

## 2022-03-16 DIAGNOSIS — E11.22 TYPE 2 DIABETES MELLITUS WITH STAGE 3 CHRONIC KIDNEY DISEASE, WITH LONG-TERM CURRENT USE OF INSULIN, UNSPECIFIED WHETHER STAGE 3A OR 3B CKD (H): Chronic | ICD-10-CM

## 2022-03-16 DIAGNOSIS — R80.9 POSITIVE FOR MACROALBUMINURIA: ICD-10-CM

## 2022-03-16 DIAGNOSIS — N18.32 CHRONIC KIDNEY DISEASE, STAGE 3B (H): Primary | ICD-10-CM

## 2022-03-16 DIAGNOSIS — N18.30 TYPE 2 DIABETES MELLITUS WITH STAGE 3 CHRONIC KIDNEY DISEASE, WITH LONG-TERM CURRENT USE OF INSULIN, UNSPECIFIED WHETHER STAGE 3A OR 3B CKD (H): Chronic | ICD-10-CM

## 2022-03-16 DIAGNOSIS — I25.10 ATHEROSCLEROSIS OF NATIVE CORONARY ARTERY WITHOUT ANGINA PECTORIS, UNSPECIFIED WHETHER NATIVE OR TRANSPLANTED HEART: ICD-10-CM

## 2022-03-16 PROCEDURE — 99214 OFFICE O/P EST MOD 30 MIN: CPT | Mod: 95 | Performed by: INTERNAL MEDICINE

## 2022-03-16 PROCEDURE — G0463 HOSPITAL OUTPT CLINIC VISIT: HCPCS | Mod: PN,RTG | Performed by: STUDENT IN AN ORGANIZED HEALTH CARE EDUCATION/TRAINING PROGRAM

## 2022-03-16 PROCEDURE — G0463 HOSPITAL OUTPT CLINIC VISIT: HCPCS | Mod: PN,RTG | Performed by: INTERNAL MEDICINE

## 2022-03-16 PROCEDURE — 99213 OFFICE O/P EST LOW 20 MIN: CPT | Mod: 95 | Performed by: STUDENT IN AN ORGANIZED HEALTH CARE EDUCATION/TRAINING PROGRAM

## 2022-03-16 RX ORDER — NITROGLYCERIN 0.4 MG/1
TABLET SUBLINGUAL
Qty: 25 TABLET | Refills: 1 | Status: ON HOLD | OUTPATIENT
Start: 2022-03-16 | End: 2024-01-01
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
## 2022-03-16 NOTE — PROGRESS NOTES
Mariel is a 76 year old who is being evaluated via a billable video visit.      How would you like to obtain your AVS? MyChart  If the video visit is dropped, the invitation should be resent by: Text to cell phone: [phone]  Will anyone else be joining your video visit? No      Video Start Time: 2:19 PM  Video-Visit Details    Type of service:  Video Visit    Video End Time:2:33 PM    Originating Location (pt. Location): Home    Distant Location (provider location):  [facility] [place]     Platform used for Video Visit: Well     Nephrology Clinic Follow Up Visit    Assessment and Plan:     # CKD 3b with microalbuminuria : Baseline Cr ~1.5-2.0 with significant fluctuation since 2005 with persistent microalbuminuria. She has overall remained stable with Cr even better recently than prior baseline. Due to recent fall (though it was mechanical/weakness) and hx of fainting, I'm hesitant to increase her losartan at this time, but would consider it in the future if microalbuminuria continues to increase and/or if SBP rises to >150. Plan for follow up in 6 months.  - No change in management  - Follow up with LIYAH in 6 months     # Hypertension: Borderline control, however has hx of fainting with stricter BP management and had recent fall. Risks of increase BP meds outweigh benefits at this time. If SBP consistently >150, would increase losartan to 50mg daily.     # Electrolytes:   Potassium, bicarb WNL on last BMP.     # Mineral Bone Disorder:   Ca 9.8, last  PTH 71 3/18/21, no recent phos. Not on Vit D (appropriately).      Assessment and plan was discussed with patient and she voiced her understanding and agreement.    I discussed the patient's plan of care with Dr. Dang.    Joann Pham MD  Nephrology Fellow    Reason for Visit:  Mariel Ribeiro is a 75 year old female with history of type II diabetes mellitus, kidney failure, TIA, CHF, CAD status post CABG, COPD, thyroid cancer status  post thyroidectomy and resultant hypothyroidism, and hypertension, who presents for management of CKD and proteinuria.    HPI:  Overall, Mariel has been feeling well. She had a fall a couple weeks ago that was due to weakness in her legs or possibly mechanical. She still has bruises on her arms but didn't break any bones. She denies any lightheadedness, dizziness, or orthostatic symptoms. Her BP has mostly been in the 130s-140s/50-80s, with occasional SBP in the 150s. She has not had any recorded low blood pressures. Her weight has been stable on her current diuretic dose. She monitors volume status with CardioMEM device in place. No SOB, no chest pain, appetite is good, no n/v/d.     Home BP: 130s-140s/50s-80s    ROS:  A comprehensive review of systems was obtained and negative, except as noted in the HPI or PMH.    Active Medical Problems:  Patient Active Problem List   Diagnosis     Hypothyroidism     Diverticulitis of colon     Coronary atherosclerosis     Myalgia and myositis     Migraine     Chronic airway obstruction/ COPD     Mononeuritis     Obstructive sleep apnea     Vitamin D deficiency     Hypertension goal BP (blood pressure) < 130/80     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)     Hyperlipidemia with target LDL less than 70     Chronic diastolic heart failure (H)     Osteoarthritis     Morbid obesity (H)     Arthritis of knee     Transient cerebral ischemia     History of thyroid cancer     Cervicalgia     Fatty liver disease, nonalcoholic     Hepatomegaly     Angina at rest (H)     S/P CABG x 3     Type 2 diabetes mellitus with stage 3 chronic kidney disease, with long-term current use of insulin (H)     Lymphedema     Lower back pain     Bilateral carotid artery disease (H)     Spinal stenosis of lumbar region, unspecified whether neurogenic claudication present     Status post coronary angiogram     Hemoptysis     Intertrigo     Malignant neoplasm of lower-inner quadrant of left breast in female,  estrogen receptor positive (H)     Pain in the coccyx     Facet arthropathy     CKD (chronic kidney disease) stage 4, GFR 15-29 ml/min (H)     Facet arthropathy, lumbar     Temporal pain     Elevated C-reactive protein     Hypertensive chronic kidney disease with stage 5 chronic kidney disease or end stage renal disease (H)       Personal Hx:   Social History     Tobacco Use     Smoking status: Former Smoker     Packs/day: 0.00     Years: 27.00     Pack years: 0.00     Types: Cigarettes     Quit date: 1989     Years since quittin.7     Smokeless tobacco: Never Used   Substance Use Topics     Alcohol use: No     Alcohol/week: 0.0 standard drinks       Allergies: Reviewed by provider.     Medications:  Current Outpatient Medications   Medication Sig     acetaminophen (TYLENOL) 325 MG tablet Take 650 mg by mouth every 4 hours as needed for mild pain Take 2 tablets by mouth every 4 hours as needed for mild pain     albuterol (PROAIR HFA/PROVENTIL HFA/VENTOLIN HFA) 108 (90 Base) MCG/ACT inhaler INHALE TWO PUFFS INTO LUNGS EVERY SIX HOURS     anastrozole (ARIMIDEX) 1 MG tablet Take 1 tablet (1 mg) by mouth daily     aspirin (ASA) 81 MG EC tablet Take 1 tablet (81 mg) by mouth daily     atorvastatin (LIPITOR) 40 MG tablet Take 1 tablet (40 mg) by mouth daily     bacitracin 500 UNIT/GM external ointment Apply ointment to the incision sites three times a day. (Patient not taking: Reported on 3/16/2022)     blood glucose (ONETOUCH ULTRA) test strip Use to test blood sugar four times daily or as directed.     bumetanide (BUMEX) 1 MG tablet 4 mg daily     buPROPion (WELLBUTRIN XL) 150 MG 24 hr tablet Take 1 tablet (150 mg) by mouth every morning (Patient not taking: Reported on 3/16/2022)     capsaicin (ZOSTRIX) 0.025 % external cream Apply 1 g topically 3 times daily (Patient not taking: Reported on 3/16/2022)     capsaicin (ZOSTRIX) 0.025 % external cream Apply 1 g topically 3 times daily For neuropathic pain.  (Patient not taking: Reported on 3/16/2022)     Continuous Blood Gluc  (FREESTYLE MARTY 14 DAY READER) JEZ 1 Units 4 times daily (before meals and nightly)     Continuous Blood Gluc Sensor (FREESTYLE MARTY 14 DAY SENSOR) [facility] NEW SENSOR TO BACK OF ARM EVERY 14 DAYS TO MONITOR BLOOD SUGARS     Continuous Blood Gluc Sensor (FREESTYLE MARTY 14 DAY SENSOR) [facility] new sensor to back of arm q14 days to monitor blood sugars     EPINEPHrine (ANY BX GENERIC EQUIV) 0.3 MG/0.3ML injection 2-pack Inject 0.3 mLs (0.3 mg) into the muscle once as needed for anaphylaxis     escitalopram (LEXAPRO) 20 MG tablet TAKE 1 TABLET(20 MG) BY MOUTH EVERY MORNING     ferrous gluconate (FERGON) 324 (38 Fe) MG tablet TAKE 1 TABLET BY MOUTH EVERY MONDAY, WEDNESDAY, AND FRIDAY MORNING     folic acid (FOLVITE) 1 MG tablet Take 2 tablets (2 mg) by mouth daily     gabapentin (NEURONTIN) 100 MG capsule Take 1 capsule (100 mg) by mouth 3 times daily (Patient not taking: Reported on 3/16/2022)     guaiFENesin (MUCINEX) 600 MG 12 hr tablet Take 1 tablet (600 mg) by mouth 2 times daily     insulin glargine (LANTUS SOLOSTAR) 100 UNIT/ML pen ADMINISTER 31 UNITS UNDER THE SKIN DAILY. CALL IF BLOOD SUGAR<70, OFTEN>200     levothyroxine (SYNTHROID) 150 MCG tablet Take 1 tablet (150 mcg) by mouth daily     FlyDataCAN FINEPOINT LANCETS MISC Use to test blood sugars 2 times daily or as directed.     losartan (COZAAR) 50 MG tablet TAKE 1/2 TABLET(25 MG) BY MOUTH DAILY     methylPREDNISolone (MEDROL DOSEPAK) 4 MG tablet therapy pack Follow Package Directions     metoprolol succinate ER (TOPROL-XL) 25 MG 24 hr tablet Take 0.5 tablets (12.5 mg) by mouth every morning AND 1 tablet (25 mg) every evening.     miconazole (MICATIN/MICRO GUARD) 2 % external powder Apply topically as needed for itching or other Redness in bilateral groin and  clef     niacin ER (NIASPAN) 500 MG CR tablet TAKE 1 TABLET(500 MG) BY MOUTH TWICE DAILY     nitroGLYcerin  "(NITROSTAT) 0.4 MG sublingual tablet For chest pain place 1 tablet under the tongue every 5 minutes for 3 doses. If symptoms persist 5 minutes after 1st dose call 911.     nystatin POWD 1 Dose 4 times daily     NYSTOP 930797 UNIT/GM powder 1 Dose     ONE TOUCH ULTRA (DEVICES) MISC test blood sugar BID     potassium chloride ER (KLOR-CON M) 10 MEQ CR tablet Take 2 tablets (20 mEq) by mouth 2 times daily     pregabalin (LYRICA) 100 MG capsule TAKE 1 CAPSULE(100 MG) BY MOUTH TWICE DAILY     pregabalin (LYRICA) 100 MG capsule Take 1 capsule (100 mg) by mouth 3 times daily     repaglinide (PRANDIN) 1 MG tablet Take 1 tablet (1 mg) by mouth 3 times daily (before meals)     Skin Protectants, Misc. ([facility] TEXTILE 10\"X144\") SHEE Externally apply 1 Box topically daily Apply to areas of intertrigo prn     tolterodine ER (DETROL LA) 2 MG 24 hr capsule TAKE 2 CAPSULES(4 MG) BY MOUTH DAILY     traMADol (ULTRAM) 50 MG tablet Take 1 tablet (50 mg) by mouth every 6 hours as needed for breakthrough pain or severe pain     traZODone (DESYREL) 50 MG tablet TAKE 3 TABLETS(150 MG) BY MOUTH AT BEDTIME     vitamin D3 (CHOLECALCIFEROL) 74652 units (250 mcg) capsule Take 1 capsule by mouth daily  (Patient not taking: Reported on 3/16/2022)     No current facility-administered medications for this visit.     Facility-Administered Medications Ordered in Other Visits   Medication     sodium chloride (PF) 0.9% PF flush 10 mL       Vitals:  There were no vitals taken for this visit.    Exam:  Vital signs were deferred for this telemedicine visit.    GENERAL APPEARANCE: alert and no distress  HENT: no obvious abnormalities on appearance  RESP: breathing appears unremarkable with normal rate, no audible wheezing or cough and no apparent shortness of breath with conversation  SKIN: no apparent rash and normal skin tone  NEURO: speech is clear with no obvious neurological deficits  PSYCH: mentation appears normal and affect normal      LABS: "   CMP  Recent Labs   Lab Test 03/09/22  1304 07/15/21  0948 07/09/21  1700 04/29/21  1116 03/18/21  1423 01/26/21  0000 12/22/20  0928    140 142 140 139  --  141   POTASSIUM 3.9 4.4 3.7 3.7 3.7  --  3.9   CHLORIDE 106 111* 107 105 107  --  108   CO2 30 27 33* 29 28  --  28   ANIONGAP 6 2* 1* 6 4  --  5   * 121* 134* 155* 174*   < > 164*   BUN 24 30 38* 36* 32*  --  33*   CR 1.37* 1.34* 1.31* 1.51* 1.36*  --  1.52*   GFRESTIMATED 40* 39* 40* 33* 38*  --  33*   GFRESTBLACK  --   --  46* 39* 44*  --  39*   ANTOINETTE 9.8 9.9 9.6 9.6 9.4  --  10.0    < > = values in this interval not displayed.     Recent Labs   Lab Test 03/09/22  1304 07/09/21  1700 04/29/21  1116 05/12/20  1420   BILITOTAL 0.5 0.4 0.4 0.5   ALKPHOS 109 108 98 113   ALT 24 22 23 58*   AST 19 20 15 31     CBC  Recent Labs   Lab Test 03/09/22  1304 07/15/21  0948 07/09/21  1700 04/29/21  1116   HGB 12.7 13.1 12.8 13.3   WBC 5.6 6.1 8.2 5.8   RBC 4.41 4.45 4.41 4.61   HCT 39.4 41.1 39.3 40.7   MCV 89 92 89 88   MCH 28.8 29.4 29.0 28.9   MCHC 32.2 31.9 32.6 32.7   RDW 13.2 13.3 13.3 13.0   * 108* 123* 117*     URINE STUDIES  Recent Labs   Lab Test 04/29/21  1117 03/16/21  1430 03/25/20  1253 04/30/19  1245 09/09/18  1754 06/30/18  1810 11/15/17  1717 08/23/17  1150 11/18/16  2322 08/10/16  1611   COLOR Yellow Light Yellow Light Yellow Light Yellow Light Yellow   < > Yellow Yellow   < > Yellow   APPEARANCE Clear Clear Slightly Cloudy Clear Clear   < > Clear Clear   < > Clear   URINEGLC Negative Negative Negative Negative Negative   < > Negative Negative   < > 250*   URINEBILI Negative Negative Negative Negative Negative   < > Negative Negative   < > Negative   URINEKETONE Negative Negative Negative Negative Negative   < > Negative Negative   < > Negative   SG 1.015 1.015 1.010 1.007 1.010   < > 1.010 1.010   < > 1.015   UBLD Negative Negative Trace* Negative Negative   < > Trace* Negative   < > Moderate*   URINEPH 5.0 5.5 5.5 5.5 5.5   < > 6.0  5.5   < > 5.5   PROTEIN Negative Negative Negative Negative Negative   < > Negative Negative   < > Negative   UROBILINOGEN 0.2  --   --   --   --   --  0.2 0.2  --  0.2   NITRITE Negative Negative Negative Negative Negative   < > Negative Negative   < > Negative   LEUKEST Negative Trace* Large* Small* Large*   < > Moderate* Moderate*   < > Negative   RBCU  --  1 3* <1 <1   < > O - 2 O - 2   < > O - 2   WBCU  --  4 >182* 3 3   < > 10-25* 5-10*   < > O - 2    < > = values in this interval not displayed.     Recent Labs   Lab Test 03/16/21  1430   UTPG 0.15     PTH  Recent Labs   Lab Test 03/18/21  1423   PTHI 71     IRON STUDIES  Recent Labs   Lab Test 08/15/18  1353 07/14/18  1122 07/14/18  0900 07/14/18  0700   IRON 19* 24* Unsatisfactory specimen - hemolyzed Unsatisfactory specimen - hemolyzed   * 322 Unsatisfactory specimen - hemolyzed Unsatisfactory specimen - hemolyzed   IRONSAT 4* 7* Not Calculated Not Calculated   CARMEN 22  --   --  58       Shakila Pham MD

## 2022-03-16 NOTE — NURSING NOTE
Diet: Patient instructed regarding a heart failure healthy diet, including discussion of reduced fat and 2000 mg daily sodium restriction, daily weights, medication purpose and compliance, fluid restrictions and resources for patient and family to access for assistance with heart failure management.       Labs: Patient was given results of the laboratory testing obtained today and patient was instructed about when to return for the next laboratory testing.     Med Reconcile: Reviewed and verified all current medications with the patient. The updated medication list was printed and given to the patient. NO CHANGES    Return Appointment: Patient given instructions regarding scheduling next clinic visit. RTC to see CORE in 6 months. RTC to see Dr. Wolf in 1 year.    Patient stated she understood all health information given and agreed to call with further questions or concerns.     Shayna Newell RN

## 2022-03-16 NOTE — LETTER
3/16/2022       RE: Mariel Ribeiro  [address] [place] [address]     Dear Colleague,    Thank you for referring your patient, Mariel Ribeiro, to the [facility] [place] at [facility]. Please see a copy of my visit note below.    Mariel is a 76 year old who is being evaluated via a billable video visit.      How would you like to obtain your AVS? MyChart  If the video visit is dropped, the invitation should be resent by: Text to cell phone: [phone]  Will anyone else be joining your video visit? No      Video Start Time: 2:19 PM  Video-Visit Details    Type of service:  Video Visit    Video End Time:2:33 PM    Originating Location (pt. Location): Home    Distant Location (provider location):  [facility] [place]     Platform used for Video Visit: Well     Nephrology Clinic Follow Up Visit    Assessment and Plan:     # CKD 3b with microalbuminuria : Baseline Cr ~1.5-2.0 with significant fluctuation since 2005 with persistent microalbuminuria. She has overall remained stable with Cr even better recently than prior baseline. Due to recent fall (though it was mechanical/weakness) and hx of fainting, I'm hesitant to increase her losartan at this time, but would consider it in the future if microalbuminuria continues to increase and/or if SBP rises to >150. Plan for follow up in 6 months.  - No change in management  - Follow up with [facility] in 6 months     # Hypertension: Borderline control, however has hx of fainting with stricter BP management and had recent fall. Risks of increase BP meds outweigh benefits at this time. If SBP consistently >150, would increase losartan to 50mg daily.     # Electrolytes:   Potassium, bicarb WNL on last BMP.     # Mineral Bone Disorder:   Ca 9.8, last  PTH 71 3/18/21, no recent phos. Not on Vit D (appropriately).      Assessment and plan was discussed with patient and she  voiced her understanding and agreement.    I discussed the patient's plan of care with Dr. Dang.    Joann Pham MD  Nephrology Fellow    Reason for Visit:  Mariel Ribeiro is a 75 year old female with history of type II diabetes mellitus, kidney failure, TIA, CHF, CAD status post CABG, COPD, thyroid cancer status post thyroidectomy and resultant hypothyroidism, and hypertension, who presents for management of CKD and proteinuria.    HPI:  Overall, Mariel has been feeling well. She had a fall a couple weeks ago that was due to weakness in her legs or possibly mechanical. She still has bruises on her arms but didn't break any bones. She denies any lightheadedness, dizziness, or orthostatic symptoms. Her BP has mostly been in the 130s-140s/50-80s, with occasional SBP in the 150s. She has not had any recorded low blood pressures. Her weight has been stable on her current diuretic dose. She monitors volume status with CardioMEM device in place. No SOB, no chest pain, appetite is good, no n/v/d.     Home BP: 130s-140s/50s-80s    ROS:  A comprehensive review of systems was obtained and negative, except as noted in the HPI or PMH.    Active Medical Problems:  Patient Active Problem List   Diagnosis     Hypothyroidism     Diverticulitis of colon     Coronary atherosclerosis     Myalgia and myositis     Migraine     Chronic airway obstruction/ COPD     Mononeuritis     Obstructive sleep apnea     Vitamin D deficiency     Hypertension goal BP (blood pressure) < 130/80     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)     Hyperlipidemia with target LDL less than 70     Chronic diastolic heart failure (H)     Osteoarthritis     Morbid obesity (H)     Arthritis of knee     Transient cerebral ischemia     History of thyroid cancer     Cervicalgia     Fatty liver disease, nonalcoholic     Hepatomegaly     Angina at rest (H)     S/P CABG x 3     Type 2 diabetes mellitus with stage 3 chronic kidney disease, with long-term current use of  insulin (H)     Lymphedema     Lower back pain     Bilateral carotid artery disease (H)     Spinal stenosis of lumbar region, unspecified whether neurogenic claudication present     Status post coronary angiogram     Hemoptysis     Intertrigo     Malignant neoplasm of lower-inner quadrant of left breast in female, estrogen receptor positive (H)     Pain in the coccyx     Facet arthropathy     CKD (chronic kidney disease) stage 4, GFR 15-29 ml/min (H)     Facet arthropathy, lumbar     Temporal pain     Elevated C-reactive protein     Hypertensive chronic kidney disease with stage 5 chronic kidney disease or end stage renal disease (H)       Personal Hx:   Social History     Tobacco Use     Smoking status: Former Smoker     Packs/day: 0.00     Years: 27.00     Pack years: 0.00     Types: Cigarettes     Quit date: 1989     Years since quittin.7     Smokeless tobacco: Never Used   Substance Use Topics     Alcohol use: No     Alcohol/week: 0.0 standard drinks       Allergies: Reviewed by provider.     Medications:  Current Outpatient Medications   Medication Sig     acetaminophen (TYLENOL) 325 MG tablet Take 650 mg by mouth every 4 hours as needed for mild pain Take 2 tablets by mouth every 4 hours as needed for mild pain     albuterol (PROAIR HFA/PROVENTIL HFA/VENTOLIN HFA) 108 (90 Base) MCG/ACT inhaler INHALE TWO PUFFS INTO LUNGS EVERY SIX HOURS     anastrozole (ARIMIDEX) 1 MG tablet Take 1 tablet (1 mg) by mouth daily     aspirin (ASA) 81 MG EC tablet Take 1 tablet (81 mg) by mouth daily     atorvastatin (LIPITOR) 40 MG tablet Take 1 tablet (40 mg) by mouth daily     bacitracin 500 UNIT/GM external ointment Apply ointment to the incision sites three times a day. (Patient not taking: Reported on 3/16/2022)     blood glucose (ONETOUCH ULTRA) test strip Use to test blood sugar four times daily or as directed.     bumetanide (BUMEX) 1 MG tablet 4 mg daily     buPROPion (WELLBUTRIN XL) 150 MG 24 hr tablet Take 1  tablet (150 mg) by mouth every morning (Patient not taking: Reported on 3/16/2022)     capsaicin (ZOSTRIX) 0.025 % external cream Apply 1 g topically 3 times daily (Patient not taking: Reported on 3/16/2022)     capsaicin (ZOSTRIX) 0.025 % external cream Apply 1 g topically 3 times daily For neuropathic pain. (Patient not taking: Reported on 3/16/2022)     Continuous Blood Gluc  (FREESTYLE MARTY 14 DAY READER) JEZ 1 Units 4 times daily (before meals and nightly)     Continuous Blood Gluc Sensor (FREESTYLE MARTY 14 DAY SENSOR) [facility] PLACE NEW SENSOR TO BACK OF ARM EVERY 14 DAYS TO MONITOR BLOOD SUGARS     Continuous Blood Gluc Sensor (FREESTYLE MARTY 14 DAY SENSOR) [facility] Place new sensor to back of arm q14 days to monitor blood sugars     EPINEPHrine (ANY BX GENERIC EQUIV) 0.3 MG/0.3ML injection 2-pack Inject 0.3 mLs (0.3 mg) into the muscle once as needed for anaphylaxis     escitalopram (LEXAPRO) 20 MG tablet TAKE 1 TABLET(20 MG) BY MOUTH EVERY MORNING     ferrous gluconate (FERGON) 324 (38 Fe) MG tablet TAKE 1 TABLET BY MOUTH EVERY MONDAY, WEDNESDAY, AND FRIDAY MORNING     folic acid (FOLVITE) 1 MG tablet Take 2 tablets (2 mg) by mouth daily     gabapentin (NEURONTIN) 100 MG capsule Take 1 capsule (100 mg) by mouth 3 times daily (Patient not taking: Reported on 3/16/2022)     guaiFENesin (MUCINEX) 600 MG 12 hr tablet Take 1 tablet (600 mg) by mouth 2 times daily     insulin glargine (LANTUS SOLOSTAR) 100 UNIT/ML pen ADMINISTER 31 UNITS UNDER THE SKIN DAILY. CALL IF BLOOD SUGAR<70, OFTEN>200     levothyroxine (SYNTHROID) 150 MCG tablet Take 1 tablet (150 mcg) by mouth daily     Keldeal FINEPOINT LANCETS MISC Use to test blood sugars 2 times daily or as directed.     losartan (COZAAR) 50 MG tablet TAKE 1/2 TABLET(25 MG) BY MOUTH DAILY     methylPREDNISolone (MEDROL DOSEPAK) 4 MG tablet therapy pack Follow Package Directions     metoprolol succinate ER (TOPROL-XL) 25 MG 24 hr tablet Take 0.5 tablets (12.5 mg)  "by mouth every morning AND 1 tablet (25 mg) every evening.     miconazole (MICATIN/MICRO GUARD) 2 % external powder Apply topically as needed for itching or other Redness in bilateral groin and  clef     niacin ER (NIASPAN) 500 MG CR tablet TAKE 1 TABLET(500 MG) BY MOUTH TWICE DAILY     nitroGLYcerin (NITROSTAT) 0.4 MG sublingual tablet For chest pain place 1 tablet under the tongue every 5 minutes for 3 doses. If symptoms persist 5 minutes after 1st dose call 911.     nystatin POWD 1 Dose 4 times daily     NYSTOP 209995 UNIT/GM powder 1 Dose     ONE TOUCH ULTRA (DEVICES) MISC test blood sugar BID     potassium chloride ER (KLOR-CON M) 10 MEQ CR tablet Take 2 tablets (20 mEq) by mouth 2 times daily     pregabalin (LYRICA) 100 MG capsule TAKE 1 CAPSULE(100 MG) BY MOUTH TWICE DAILY     pregabalin (LYRICA) 100 MG capsule Take 1 capsule (100 mg) by mouth 3 times daily     repaglinide (PRANDIN) 1 MG tablet Take 1 tablet (1 mg) by mouth 3 times daily (before meals)     Skin Protectants, Misc. ([facility] [employer] [employer] TEXTILE 10\"X144\") SHEE Externally apply 1 Box topically daily Apply to areas of intertrigo prn     tolterodine ER (DETROL LA) 2 MG 24 hr capsule TAKE 2 CAPSULES(4 MG) BY MOUTH DAILY     traMADol (ULTRAM) 50 MG tablet Take 1 tablet (50 mg) by mouth every 6 hours as needed for breakthrough pain or severe pain     traZODone (DESYREL) 50 MG tablet TAKE 3 TABLETS(150 MG) BY MOUTH AT BEDTIME     vitamin D3 (CHOLECALCIFEROL) 04377 units (250 mcg) capsule Take 1 capsule by mouth daily  (Patient not taking: Reported on 3/16/2022)     No current facility-administered medications for this visit.     Facility-Administered Medications Ordered in Other Visits   Medication     sodium chloride (PF) 0.9% PF flush 10 mL       Vitals:  There were no vitals taken for this visit.    Exam:  Vital signs were deferred for this telemedicine visit.    GENERAL APPEARANCE: alert and no distress  HENT: no obvious abnormalities on " appearance  RESP: breathing appears unremarkable with normal rate, no audible wheezing or cough and no apparent shortness of breath with conversation  SKIN: no apparent rash and normal skin tone  NEURO: speech is clear with no obvious neurological deficits  PSYCH: mentation appears normal and affect normal      LABS:   CMP  Recent Labs   Lab Test 03/09/22  1304 07/15/21  0948 07/09/21  1700 04/29/21  1116 03/18/21  1423 01/26/21  0000 12/22/20  0928    140 142 140 139  --  141   POTASSIUM 3.9 4.4 3.7 3.7 3.7  --  3.9   CHLORIDE 106 111* 107 105 107  --  108   CO2 30 27 33* 29 28  --  28   ANIONGAP 6 2* 1* 6 4  --  5   * 121* 134* 155* 174*   < > 164*   BUN 24 30 38* 36* 32*  --  33*   CR 1.37* 1.34* 1.31* 1.51* 1.36*  --  1.52*   GFRESTIMATED 40* 39* 40* 33* 38*  --  33*   GFRESTBLACK  --   --  46* 39* 44*  --  39*   ANTOINETTE 9.8 9.9 9.6 9.6 9.4  --  10.0    < > = values in this interval not displayed.     Recent Labs   Lab Test 03/09/22  1304 07/09/21  1700 04/29/21  1116 05/12/20  1420   BILITOTAL 0.5 0.4 0.4 0.5   ALKPHOS 109 108 98 113   ALT 24 22 23 58*   AST 19 20 15 31     CBC  Recent Labs   Lab Test 03/09/22  1304 07/15/21  0948 07/09/21  1700 04/29/21  1116   HGB 12.7 13.1 12.8 13.3   WBC 5.6 6.1 8.2 5.8   RBC 4.41 4.45 4.41 4.61   HCT 39.4 41.1 39.3 40.7   MCV 89 92 89 88   MCH 28.8 29.4 29.0 28.9   MCHC 32.2 31.9 32.6 32.7   RDW 13.2 13.3 13.3 13.0   * 108* 123* 117*     URINE STUDIES  Recent Labs   Lab Test 04/29/21  1117 03/16/21  1430 03/25/20  1253 04/30/19  1245 09/09/18  1754 06/30/18  1810 11/15/17  1717 08/23/17  1150 11/18/16  2322 08/10/16  1611   COLOR Yellow Light Yellow Light Yellow Light Yellow Light Yellow   < > Yellow Yellow   < > Yellow   APPEARANCE Clear Clear Slightly Cloudy Clear Clear   < > Clear Clear   < > Clear   URINEGLC Negative Negative Negative Negative Negative   < > Negative Negative   < > 250*   URINEBILI Negative Negative Negative Negative Negative   < >  Negative Negative   < > Negative   URINEKETONE Negative Negative Negative Negative Negative   < > Negative Negative   < > Negative   SG 1.015 1.015 1.010 1.007 1.010   < > 1.010 1.010   < > 1.015   UBLD Negative Negative Trace* Negative Negative   < > Trace* Negative   < > Moderate*   URINEPH 5.0 5.5 5.5 5.5 5.5   < > 6.0 5.5   < > 5.5   PROTEIN Negative Negative Negative Negative Negative   < > Negative Negative   < > Negative   UROBILINOGEN 0.2  --   --   --   --   --  0.2 0.2  --  0.2   NITRITE Negative Negative Negative Negative Negative   < > Negative Negative   < > Negative   LEUKEST Negative Trace* Large* Small* Large*   < > Moderate* Moderate*   < > Negative   RBCU  --  1 3* <1 <1   < > O - 2 O - 2   < > O - 2   WBCU  --  4 >182* 3 3   < > 10-25* 5-10*   < > O - 2    < > = values in this interval not displayed.     Recent Labs   Lab Test 03/16/21  1430   UTPG 0.15     PTH  Recent Labs   Lab Test 03/18/21  1423   PTHI 71     IRON STUDIES  Recent Labs   Lab Test 08/15/18  1353 07/14/18  1122 07/14/18  0900 07/14/18  0700   IRON 19* 24* Unsatisfactory specimen - hemolyzed Unsatisfactory specimen - hemolyzed   * 322 Unsatisfactory specimen - hemolyzed Unsatisfactory specimen - hemolyzed   IRONSAT 4* 7* Not Calculated Not Calculated   CARMEN 22  --   --  58       [facility] Mirela Pham MD    Attestation signed by Avelina Dang MD at 3/21/2022  2:27 PM:  I was present with the fellow during the virtual visit. I discussed the case with Dr Pham and agree with the findings as documented in the history, assessment and plan.    Avelina Dang MD  Division of Nephrology and Hypertension   P [phone]

## 2022-03-16 NOTE — NURSING NOTE
Chief Complaint   Patient presents with     Follow Up     HF, pt states echo and labs were done last week, Terra will also be joining visit and has questions about her Bumex dosage, pt has also had a cough for a few months but not sure if thats related to heart or COPD,      Patient denies any changes regarding medication and allergies and states all information remains accurate since last reviewed.    Cristal Coello, VF/CMA

## 2022-03-16 NOTE — PATIENT INSTRUCTIONS
Cardiology Providers you saw during your visit:  Dr. Wolf     Medication changes:  1- No changes  2 - We did refill your nitroglycerin for you.        Follow up:  1- Follow up with [facility] clinic in 6 months   2 - Follow up with Dr. Wolf in 1 year     Please call if you have:  1. Weight gain of more than 2 pounds in a day or 5 pounds in a week  2. Increased shortness of breath, swelling or bloating  3. Dizziness, lightheadedness   4. Any questions or concerns.      Follow the American Heart Association Diet and Lifestyle recommendations:  Limit saturated fat, trans fat, sodium, red meat, sweets and sugar-sweetened beverages. If you choose to eat red meat, compare labels and select the leanest cuts available.  Aim for at least 150 minutes of moderate physical activity or 75 minutes of vigorous physical activity - or an equal combination of both - each week.     During business hours: [phone], press option # 1 to schedule an appointment or send a message to your care team     After hours, weekends or holidays: On Call Cardiologist- [phone]   option #4 and ask to speak to the on-call Cardiologist. Inform them you are a CORE/heart failure patient at the [facility].     Shayna Newell RN BSN  Cardiology Nurse Care Coordinator     Keep up the good work!     Take Care!

## 2022-03-18 ENCOUNTER — OFFICE VISIT (OUTPATIENT)
Dept: ORTHOPEDICS | Facility: CLINIC | Age: 77
End: 2022-03-18
Payer: MEDICARE

## 2022-03-18 ENCOUNTER — ANCILLARY PROCEDURE (OUTPATIENT)
Dept: GENERAL RADIOLOGY | Facility: CLINIC | Age: 77
End: 2022-03-18
Attending: STUDENT IN AN ORGANIZED HEALTH CARE EDUCATION/TRAINING PROGRAM
Payer: MEDICARE

## 2022-03-18 ENCOUNTER — PATIENT OUTREACH (OUTPATIENT)
Dept: CARDIOLOGY | Facility: CLINIC | Age: 77
End: 2022-03-18

## 2022-03-18 DIAGNOSIS — M25.561 CHRONIC PAIN OF BOTH KNEES: ICD-10-CM

## 2022-03-18 DIAGNOSIS — M25.561 BILATERAL KNEE PAIN: ICD-10-CM

## 2022-03-18 DIAGNOSIS — M25.562 CHRONIC PAIN OF BOTH KNEES: ICD-10-CM

## 2022-03-18 DIAGNOSIS — M25.579 PAIN IN JOINT, ANKLE AND FOOT: ICD-10-CM

## 2022-03-18 DIAGNOSIS — M25.562 BILATERAL KNEE PAIN: ICD-10-CM

## 2022-03-18 DIAGNOSIS — M25.562 BILATERAL KNEE PAIN: Primary | ICD-10-CM

## 2022-03-18 DIAGNOSIS — M25.561 BILATERAL KNEE PAIN: Primary | ICD-10-CM

## 2022-03-18 DIAGNOSIS — S92.512K: Primary | ICD-10-CM

## 2022-03-18 DIAGNOSIS — G89.29 CHRONIC PAIN OF BOTH KNEES: ICD-10-CM

## 2022-03-18 PROCEDURE — 73630 X-RAY EXAM OF FOOT: CPT | Mod: LT | Performed by: RADIOLOGY

## 2022-03-18 PROCEDURE — 99203 OFFICE O/P NEW LOW 30 MIN: CPT | Performed by: STUDENT IN AN ORGANIZED HEALTH CARE EDUCATION/TRAINING PROGRAM

## 2022-03-18 PROCEDURE — 73562 X-RAY EXAM OF KNEE 3: CPT | Mod: RT | Performed by: RADIOLOGY

## 2022-03-18 NOTE — TELEPHONE ENCOUNTER
Called Mariel to check in on [facility]. When seen in clinic 3/16 was having issues with equipment.   No readings have come through yet.   Left message and mychart message sent. Rosina Mays RN

## 2022-03-18 NOTE — LETTER
3/18/2022      RE: Mariel Ribeiro  [address] [place] [address]       [facility]  Sports Medicine Clinic  [facility]           SUBJECTIVE       Mariel Ribeiro is a 76 year old female presenting to clinic today with knee pain and toe pain.    Background:   Date of injury: 1/28/22  Duration of symptoms: 1.5 months  Mechanism of Injury: Pt fell on all fours  Intensity: 8/10  Aggravating factors: Walking   Relieving Factors: Rest, ice   Prior Evaluation: none      PMH, Medications and Allergies were reviewed and updated as needed.    ROS:  As noted above otherwise negative.    Patient Active Problem List   Diagnosis     Hypothyroidism     Diverticulitis of colon     Coronary atherosclerosis     Myalgia and myositis     Migraine     Chronic airway obstruction/ COPD     Mononeuritis     Obstructive sleep apnea     Vitamin D deficiency     Hypertension goal BP (blood pressure) < 130/80     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)     Hyperlipidemia with target LDL less than 70     Chronic diastolic heart failure (H)     Osteoarthritis     Morbid obesity (H)     Arthritis of knee     Transient cerebral ischemia     History of thyroid cancer     Cervicalgia     Fatty liver disease, nonalcoholic     Hepatomegaly     Angina at rest (H)     S/P CABG x 3     Type 2 diabetes mellitus with stage 3 chronic kidney disease, with long-term current use of insulin (H)     Lymphedema     Lower back pain     Bilateral carotid artery disease (H)     Spinal stenosis of lumbar region, unspecified whether neurogenic claudication present     Status post coronary angiogram     Hemoptysis     Intertrigo     Malignant neoplasm of lower-inner quadrant of left breast in female, estrogen receptor positive (H)     Pain in the coccyx     Facet arthropathy     CKD (chronic kidney disease) stage 4, GFR 15-29 ml/min (H)     Facet arthropathy, lumbar     Temporal pain     Elevated C-reactive protein      Hypertensive chronic kidney disease with stage 5 chronic kidney disease or end stage renal disease (H)       Current Outpatient Medications   Medication Sig Dispense Refill     acetaminophen (TYLENOL) 325 MG tablet Take 650 mg by mouth every 4 hours as needed for mild pain Take 2 tablets by mouth every 4 hours as needed for mild pain 100 tablet      albuterol (PROAIR HFA/PROVENTIL HFA/VENTOLIN HFA) 108 (90 Base) MCG/ACT inhaler INHALE TWO PUFFS INTO LUNGS EVERY SIX HOURS 25.5 g 9     anastrozole (ARIMIDEX) 1 MG tablet Take 1 tablet (1 mg) by mouth daily 90 tablet 3     aspirin (ASA) 81 MG EC tablet Take 1 tablet (81 mg) by mouth daily       atorvastatin (LIPITOR) 40 MG tablet Take 1 tablet (40 mg) by mouth daily 90 tablet 0     bacitracin 500 UNIT/GM external ointment Apply ointment to the incision sites three times a day. (Patient not taking: Reported on 3/16/2022) 28 g 0     blood glucose (ONETOUCH ULTRA) test strip Use to test blood sugar four times daily or as directed. 3 Box 3     bumetanide (BUMEX) 1 MG tablet 4 mg daily 360 tablet 1     buPROPion (WELLBUTRIN XL) 150 MG 24 hr tablet Take 1 tablet (150 mg) by mouth every morning (Patient not taking: Reported on 3/16/2022) 30 tablet 3     capsaicin (ZOSTRIX) 0.025 % external cream Apply 1 g topically 3 times daily (Patient not taking: Reported on 3/16/2022) 120 g 1     capsaicin (ZOSTRIX) 0.025 % external cream Apply 1 g topically 3 times daily For neuropathic pain. (Patient not taking: Reported on 3/16/2022) 60 g 1     Continuous Blood Gluc  (FREESTYLE MARTY 14 DAY READER) JEZ 1 Units 4 times daily (before meals and nightly) 1 Device 0     Continuous Blood Gluc Sensor (FREESTYLE MARTY 14 DAY SENSOR) [facility] PLACE NEW SENSOR TO BACK OF ARM EVERY 14 DAYS TO MONITOR BLOOD SUGARS 2 each 4     Continuous Blood Gluc Sensor (FREESTYLE MARTY 14 DAY SENSOR) [facility] Place new sensor to back of arm q14 days to monitor blood sugars 6 each 3     EPINEPHrine (ANY BX  GENERIC EQUIV) 0.3 MG/0.3ML injection 2-pack Inject 0.3 mLs (0.3 mg) into the muscle once as needed for anaphylaxis 0.6 mL 3     escitalopram (LEXAPRO) 20 MG tablet TAKE 1 TABLET(20 MG) BY MOUTH EVERY MORNING 90 tablet 0     ferrous gluconate (FERGON) 324 (38 Fe) MG tablet TAKE 1 TABLET BY MOUTH EVERY MONDAY, WEDNESDAY, AND FRIDAY MORNING 36 tablet 2     folic acid (FOLVITE) 1 MG tablet Take 2 tablets (2 mg) by mouth daily       gabapentin (NEURONTIN) 100 MG capsule Take 1 capsule (100 mg) by mouth 3 times daily (Patient not taking: Reported on 3/16/2022) 90 capsule 11     guaiFENesin (MUCINEX) 600 MG 12 hr tablet Take 1 tablet (600 mg) by mouth 2 times daily       insulin glargine (LANTUS SOLOSTAR) 100 UNIT/ML pen ADMINISTER 31 UNITS UNDER THE SKIN DAILY. CALL IF BLOOD SUGAR<70, OFTEN>200 15 mL 1     levothyroxine (SYNTHROID) 150 MCG tablet Take 1 tablet (150 mcg) by mouth daily 90 tablet 2     [employer] FINEPOINT LANCETS MISC Use to test blood sugars 2 times daily or as directed. 100 each prn     losartan (COZAAR) 50 MG tablet TAKE 1/2 TABLET(25 MG) BY MOUTH DAILY 45 tablet 0     methylPREDNISolone (MEDROL DOSEPAK) 4 MG tablet therapy pack Follow Package Directions 21 tablet 0     metoprolol succinate ER (TOPROL-XL) 25 MG 24 hr tablet Take 0.5 tablets (12.5 mg) by mouth every morning AND 1 tablet (25 mg) every evening. 135 tablet 3     miconazole (MICATIN/MICRO GUARD) 2 % external powder Apply topically as needed for itching or other Redness in bilateral groin and katharine clef 43 g 0     niacin ER (NIASPAN) 500 MG CR tablet TAKE 1 TABLET(500 MG) BY MOUTH TWICE DAILY 180 tablet 0     nitroGLYcerin (NITROSTAT) 0.4 MG sublingual tablet For chest pain place 1 tablet under the tongue every 5 minutes for 3 doses. If symptoms persist 5 minutes after 1st dose call 911. 25 tablet 1     nystatin POWD 1 Dose 4 times daily 1 each 1     NYSTOP 352964 UNIT/GM powder 1 Dose       ONE TOUCH ULTRA (DEVICES) MISC test blood sugar BID  "1 0     potassium chloride ER (KLOR-CON M) 10 MEQ CR tablet Take 2 tablets (20 mEq) by mouth 2 times daily 120 tablet 6     pregabalin (LYRICA) 100 MG capsule TAKE 1 CAPSULE(100 MG) BY MOUTH TWICE DAILY 180 capsule 1     pregabalin (LYRICA) 100 MG capsule Take 1 capsule (100 mg) by mouth 3 times daily 90 capsule 0     repaglinide (PRANDIN) 1 MG tablet Take 1 tablet (1 mg) by mouth 3 times daily (before meals) 180 tablet 3     Skin Protectants, Misc. (INTERDRY AG TEXTILE 10\"X144\") SHEE Externally apply 1 Box topically daily Apply to areas of intertrigo prn 1 each 3     tolterodine ER (DETROL LA) 2 MG 24 hr capsule TAKE 2 CAPSULES(4 MG) BY MOUTH DAILY 180 capsule 0     traMADol (ULTRAM) 50 MG tablet Take 1 tablet (50 mg) by mouth every 6 hours as needed for breakthrough pain or severe pain 60 tablet 0     traZODone (DESYREL) 50 MG tablet TAKE 3 TABLETS(150 MG) BY MOUTH AT BEDTIME 270 tablet 1            OBJECTIVE:       Vitals: There were no vitals filed for this visit.  BMI: There is no height or weight on file to calculate BMI.    Gen:  Well nourished and in no acute distress  HEENT: Extraocular movement intact  Neck: Supple  Pulm:  Breathing Comfortably. No increased respiratory effort.  Psych: Euthymic. Appropriately answers questions    MSK: Patient examined in wheelchair.  Range of motion slightly diminished in extension of the bilateral knees from 0 to 110 degrees of flexion.  Tenderness to palpation bilaterally the anterior aspect of the knee in correlation with the superior pole of the patella bilaterally, as well as the joint line laterally on the left, and laterally on the right.  No significant evidence of an effusion.  Mild ecchymosis noted on the lateral aspect of the lower extremity without tenderness to palpation.  Evocative testing limited due to patient's body habitus as well as mobility.      XRAY : X-rays of the bilateral knee were reviewed in detail with the patient and her roommate.  There is " evidence of previous TKA bilaterally.  No  hardware malpositioning.    X-ray left toe: Second proximal phalanx fracture with evidence of shortening, displaced.           ASSESSMENT and PLAN:     Mariel was seen today for pain, pain and pain.    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  She was seen in the emergency room where most of her complaints were centered around her head.  Imaging was done at that time for the most problematic area.  Since that time the patient has of the bilateral anterior knee pain as well as second toe pain on the left foot.  She has had evidence of bruising in that area.  Has never been seen until today for imaging of that.  Patient does have evidence of total knee arthroplasty bilaterally, hardware seems to be in place.  No evidence of acute fracture was seen on imaging for today.  X-rays were discussed with the patient in detail.  For her toe, there is evidence of a significantly shortened and displaced proximal phalanx fracture of the second toe of the left foot.  The patient is having complications with sensation of pain in the area due to overlying neuropathy.    Overall, her diagnosis of the knee is consistent with previous osteoarthritic changes with an acute flare secondary to a fall without evidence of an acute fracture or hardware malpositioning.  For the toe, this is complicated given the fact that the patient is currently 2 months out from the fracture.  She is also likely a poor surgical candidate given the systolic heart failure as well as diabetes and neuropathy.  However given the displacement as well as shortening of the toe, we have we will will reach out to one of our orthopedic foot and ankle surgeons, Dr. Alfaro, for consultation and evaluation.  This  will likely be nonoperative patient given the significant comorbidities.  For now, I have placed the patient in a postop shoe for protection as she is only wearing sandals at home.  For her knees, have placed her in physical therapy.  We have also given her lidocaine patches to be worn every 24 hours as needed.  Continue Tylenol.  Will reach out to the patient after discussion with one of our orthopedic foot and ankle specialist.    Options for treatment and/or follow-up care were reviewed with the patient was actively involved in the decision making process. Patient verbalized understanding and was in agreement with the plan.    Victor Manuel Salazar DO  , Sports Medicine  Department of Family Medicine and [facility]    \

## 2022-03-21 ENCOUNTER — ALLIED HEALTH/NURSE VISIT (OUTPATIENT)
Dept: OTHER | Facility: CLINIC | Age: 77
End: 2022-03-21
Payer: MEDICARE

## 2022-03-21 ENCOUNTER — PATIENT OUTREACH (OUTPATIENT)
Dept: CARDIOLOGY | Facility: CLINIC | Age: 77
End: 2022-03-21

## 2022-03-21 VITALS
WEIGHT: 293 LBS | BODY MASS INDEX: 49.96 KG/M2 | SYSTOLIC BLOOD PRESSURE: 121 MMHG | TEMPERATURE: 97.9 F | OXYGEN SATURATION: 94 % | DIASTOLIC BLOOD PRESSURE: 50 MMHG | HEART RATE: 74 BPM

## 2022-03-21 DIAGNOSIS — M54.50 LOWER BACK PAIN: Primary | ICD-10-CM

## 2022-03-21 PROCEDURE — 99207 PR COMMUNITY PARAMEDIC - PATIENT NOT BILLABLE: CPT

## 2022-03-21 NOTE — TELEPHONE ENCOUNTER
Called Mariel and Odalis back to let them know that transmission worked and PAD is 14 within her goal range today.

## 2022-03-21 NOTE — TELEPHONE ENCOUNTER
Called Mariel to check on cardiomems technical issues again. Spoke with Mariel and Odalis on speakerphone. She reports that they haven't tried it again since last week but it was giving them issues last week. Advised to try it again today, if still having issues to call tech support number on the pillow. They will try to send reading as soon as we hang up.

## 2022-03-21 NOTE — PROGRESS NOTES
[facility] Follow-up Visit  March 21, 2022  TIME: 1300    Mariel Ribeiro is a 76 year old female being seen at home for a follow-up visit.    Present at appointment:           Chief Complaint   Patient presents with     Outreach       [place] Utilization:      Utilization    Hospital Admissions  0             ED Visits  2             No Show Count (past year)  0                Current as of: 3/20/2022  6:55 AM              /50 (BP Location: Right arm, Patient Position: Sitting)   Pulse 74   Temp 97.9  F (36.6  C)   Wt 144.7 kg (319 lb)   SpO2 94%   BMI 49.96 kg/m      Clinical Concerns:  Current Medical Concerns:  Toe fx    Current Behavioral Concerns: no    Education Provided to patient: no   Medication set up? No  Pill Box issued: No  Scale issued: No  Flu Shot given: No  COVID Vaccine Given: No  Lab draw or specimen collection: No  Food box issued: No  Collaborative visit with PCP: No  Wound Care: No    Health Maintenance Reviewed:      Clinical Pathway: None    No  Face to Face in Home / Community    Recent blood sugars: 171 after breakfast    Review of Symptoms/PE    Skin: negative  Eyes: negative  Ears/Nose/Throat: negative  Respiratory: Dyspnea on exertion-   Cardiovascular: negative  Gastrointestinal: negative  Genitourinary: negative  Musculoskeletal: back pain and injury to L 2nd toe  Neurologic: headaches, numbness or tingling of hands, numbness or tingling of feet and memory problems  Psychiatric: negative    Pain Management::                 Plan:     Time spent with patient: 45    The patient meets one or more of the following criteria:  * Requires services to prevent readmission to a nursing home or hospital    Next CP visit scheduled: 4/18/22    Issues for Provider to follow up on: Pt main concern is the L 2nd toe fx and is waiting to see what the treatment might be. She denies any recent falls.    Provider follow up visit needed: multiple upcoming

## 2022-03-24 ENCOUNTER — TELEPHONE (OUTPATIENT)
Dept: ORTHOPEDICS | Facility: CLINIC | Age: 77
End: 2022-03-24
Payer: MEDICARE

## 2022-03-24 ENCOUNTER — CARE COORDINATION (OUTPATIENT)
Dept: CARDIOLOGY | Facility: CLINIC | Age: 77
End: 2022-03-24
Payer: MEDICARE

## 2022-03-24 NOTE — PROGRESS NOTES
[facility] - CardioMEMS Reading Review    March 24, 2022   CardioMEMS reviewed.  Current diuretic: Bumex 4 mg daily  Recent plan of care changes: None. PAD in goal range.    Current Threshold parameters:       Today's Waveform:       Readings:           RHC Date Wedge Pressure MEMS PAD during cath  (average of the 3 values)     7/1/2019 w/MEMS implant     20 mmHg   18 mmHg           Rosina Mays RN

## 2022-03-24 NOTE — TELEPHONE ENCOUNTER
Contacted Mariel after discussion and review by one of our foot and ankle specialist.  At this time, there is no indication for surgical intervention given the amount of time that has passed since the injury as well as there being callus formation.  Mariel did state that she is having some pain with ambulation, and the postop shoe that was provided for protection has been worn, but seems to be ill fitting.  I did discuss with her that if she is having pain with ambulation, this could be multifactorial given the neuropathy that she is already experiencing as well as the history of the fracture.  For protection purposes, I did offer the patient a cam boot for ambulation relief.  The patient and her roommate said they will discuss and think about it.  If they are willing and want to proceed with this, they will contact our clinic.  We can have him scheduled for an  visit only for fitting for a cam boot.  Again reviewed the x-rays of the knees with the patient and her roommate.  All questions were answered.  Please follow-up with us as needed.    Victor Manuel Salazar DO  , Sports Medicine  Department of Family Medicine and [facility]
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
## 2022-03-28 ENCOUNTER — ONCOLOGY VISIT (OUTPATIENT)
Dept: ONCOLOGY | Facility: CLINIC | Age: 77
End: 2022-03-28
Attending: INTERNAL MEDICINE
Payer: MEDICARE

## 2022-03-28 ENCOUNTER — ANCILLARY PROCEDURE (OUTPATIENT)
Dept: MAMMOGRAPHY | Facility: CLINIC | Age: 77
End: 2022-03-28
Attending: INTERNAL MEDICINE
Payer: MEDICARE

## 2022-03-28 VITALS
TEMPERATURE: 98.2 F | HEART RATE: 62 BPM | SYSTOLIC BLOOD PRESSURE: 164 MMHG | DIASTOLIC BLOOD PRESSURE: 67 MMHG | OXYGEN SATURATION: 96 %

## 2022-03-28 DIAGNOSIS — Z17.0 MALIGNANT NEOPLASM OF LOWER-INNER QUADRANT OF LEFT BREAST IN FEMALE, ESTROGEN RECEPTOR POSITIVE (H): Primary | ICD-10-CM

## 2022-03-28 DIAGNOSIS — D05.12 DUCTAL CARCINOMA IN SITU (DCIS) OF LEFT BREAST: ICD-10-CM

## 2022-03-28 DIAGNOSIS — N18.32 STAGE 3B CHRONIC KIDNEY DISEASE (H): ICD-10-CM

## 2022-03-28 DIAGNOSIS — I50.22 CHRONIC SYSTOLIC CONGESTIVE HEART FAILURE (H): ICD-10-CM

## 2022-03-28 DIAGNOSIS — C50.312 MALIGNANT NEOPLASM OF LOWER-INNER QUADRANT OF LEFT BREAST IN FEMALE, ESTROGEN RECEPTOR POSITIVE (H): Primary | ICD-10-CM

## 2022-03-28 DIAGNOSIS — E66.01 MORBID OBESITY (H): ICD-10-CM

## 2022-03-28 DIAGNOSIS — L72.3 SEBACEOUS CYST: ICD-10-CM

## 2022-03-28 PROCEDURE — 77066 DX MAMMO INCL CAD BI: CPT | Performed by: RADIOLOGY

## 2022-03-28 PROCEDURE — G0279 TOMOSYNTHESIS, MAMMO: HCPCS | Performed by: RADIOLOGY

## 2022-03-28 PROCEDURE — G0463 HOSPITAL OUTPT CLINIC VISIT: HCPCS

## 2022-03-28 PROCEDURE — 99214 OFFICE O/P EST MOD 30 MIN: CPT | Performed by: INTERNAL MEDICINE

## 2022-03-28 PROCEDURE — 76642 ULTRASOUND BREAST LIMITED: CPT | Mod: LT | Performed by: RADIOLOGY

## 2022-03-28 ASSESSMENT — PAIN SCALES - GENERAL: PAINLEVEL: NO PAIN (1)

## 2022-03-28 NOTE — NURSING NOTE
"Oncology Rooming Note    March 28, 2022 2:48 PM   Mariel Ribeiro is a 76 year old female who presents for:    Chief Complaint   Patient presents with     Oncology Clinic Visit     Breast Cancer      Initial Vitals: Pulse 62   SpO2 96%  Estimated body mass index is 49.96 kg/m  as calculated from the following:    Height as of 7/15/21: 1.702 m (5' 7\").    Weight as of 3/21/22: 144.7 kg (319 lb). There is no height or weight on file to calculate BSA.  No Pain (1) Comment: Data Unavailable   No LMP recorded. Patient is postmenopausal.  Allergies reviewed: Yes  Medications reviewed: Yes    Medications: Medication refills not needed today.  Pharmacy name entered into [facility]:    [id number] - [place], [place] - [address] AT [facility] [address]  [id number] - [place], [place] - [address] AT [facility] [address]    Clinical concerns: none       Georgie Kapoor, EMT            "
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
## 2022-03-29 ENCOUNTER — PATIENT OUTREACH (OUTPATIENT)
Dept: NURSING | Facility: CLINIC | Age: 77
End: 2022-03-29
Payer: MEDICARE

## 2022-03-29 ASSESSMENT — ACTIVITIES OF DAILY LIVING (ADL): DEPENDENT_IADLS:: SHOPPING

## 2022-03-29 NOTE — PROGRESS NOTES
Clinic Care Coordination Contact    Follow Up Progress Note      Writer reached out to patient's friend Odalis for Care Coordination follow up. The phone call was rushed due to Odalis being busy. Odalis shares that home care for patient is going well and she denies any other questions or concerns at this time. She shares that she appreciates the monthly outreaches from care coordination and asks that they continue.   Medications not reviewed and goals not addressed this visit.   Writer will reach out in 1 month.     Peggy Raines RN Care Coordinator     [facility]  [facility]  [email or web address]    Office: [phone]

## 2022-03-30 ENCOUNTER — PATIENT OUTREACH (OUTPATIENT)
Dept: CARDIOLOGY | Facility: CLINIC | Age: 77
End: 2022-03-30
Payer: MEDICARE

## 2022-03-30 NOTE — TELEPHONE ENCOUNTER
Called Mariel to check in as hasn't sent a Cardiomems reading since 3/23.  Mariel reports she will send a reading today. Rosina Mays RN

## 2022-03-30 NOTE — TELEPHONE ENCOUNTER
[facility] - CardioMEMS Reading Review    March 30, 2022   CardioMEMS reviewed.  Current diuretic: Bumex 4 mg daily  Recent plan of care changes:     Current Threshold parameters:       Today's Waveform:       Readings:           RHC Date Wedge Pressure MEMS PAD during cath  (average of the 3 values)     7/1/2019 w/MEMS implant     20 mmHg   18 mmHg           Rosina Mays RN

## 2022-04-03 ENCOUNTER — ALLIED HEALTH/NURSE VISIT (OUTPATIENT)
Dept: CARDIOLOGY | Facility: CLINIC | Age: 77
End: 2022-04-03
Attending: NURSE PRACTITIONER
Payer: MEDICARE

## 2022-04-03 DIAGNOSIS — I50.32 CHRONIC DIASTOLIC HEART FAILURE (H): Primary | ICD-10-CM

## 2022-04-03 PROCEDURE — 93264 REM MNTR WRLS P-ART PRS SNR: CPT | Performed by: NURSE PRACTITIONER

## 2022-04-04 DIAGNOSIS — R35.0 URINARY FREQUENCY: Primary | ICD-10-CM

## 2022-04-04 DIAGNOSIS — R35.0 URINARY FREQUENCY: ICD-10-CM

## 2022-04-04 RX ORDER — TOLTERODINE 2 MG/1
2 CAPSULE, EXTENDED RELEASE ORAL DAILY
Qty: 30 CAPSULE | Refills: 0 | Status: SHIPPED | OUTPATIENT
Start: 2022-04-04 | End: 2022-07-11

## 2022-04-04 RX ORDER — TOLTERODINE 2 MG/1
CAPSULE, EXTENDED RELEASE ORAL
Qty: 90 CAPSULE | OUTPATIENT
Start: 2022-04-04

## 2022-04-05 ENCOUNTER — TELEPHONE (OUTPATIENT)
Dept: VASCULAR SURGERY | Facility: CLINIC | Age: 77
End: 2022-04-05
Payer: MEDICARE

## 2022-04-05 ENCOUNTER — CARE COORDINATION (OUTPATIENT)
Dept: CARDIOLOGY | Facility: CLINIC | Age: 77
End: 2022-04-05
Payer: MEDICARE

## 2022-04-05 NOTE — PROGRESS NOTES
30 days of remote monitoring completed: cardioMEMS  Dates: 3/2/22 through 4/3/22     Summary of monitoring and changes:  No changes. PAD has overall been in goal range.   See below as well as encounters dated 3/9/22  3/24/22  3/30/22      Readings:

## 2022-04-05 NOTE — TELEPHONE ENCOUNTER
[facility] Call Center    Phone Message    May a detailed message be left on voicemail: yes     Reason for Call: Order(s): Home Care Orders: Skilled Nursin time skilled RN eval for skin integrity.   Please call Polly Lion at [phone] and you can leave a private message there. Thanks.    Action Taken: Message routed to:  [facility] IR    Travel Screening: Not Applicable

## 2022-04-05 NOTE — PROGRESS NOTES
[facility] - CardioMEMS Reading Review    April 5, 2022   CardioMEMS reviewed.  Current diuretic: Bumex 4 mg daily  Recent plan of care changes: none. PAD in goal range.     Current Threshold parameters:       Today's Waveform:       Readings:         RHC Date Wedge Pressure MEMS PAD during cath  (average of the 3 values)     7/1/2019 w/MEMS implant     20 mmHg   18 mmHg           Rosina Mays RN

## 2022-04-06 NOTE — PROGRESS NOTES
Remote monitoring of Pulmonary Artery Pressures via CardioMEMS device reviewed at least weekly and as needed with CORE nursing. Diuretics adjusted accordingly.    billing dates of 3/5/22 through 4/3/22      [place] [facility]

## 2022-04-07 ENCOUNTER — TELEPHONE (OUTPATIENT)
Dept: FAMILY MEDICINE | Facility: CLINIC | Age: 77
End: 2022-04-07
Payer: MEDICARE

## 2022-04-07 NOTE — TELEPHONE ENCOUNTER
(KEIRA Daly - I have a quick action that states this below and routes to Triage.  Let's make one for you! - SJ)    I approve of requested home care orders.    Tricia Hdz MD

## 2022-04-07 NOTE — TELEPHONE ENCOUNTER
Writer called [facility], spoke with Kathy and reviewed JULIANA Wolf CNP's current clinic location, phone and fax numbers.    Kathy verbalized understanding and stated she will notify patient's physical therapist, staff who send out home care order faxes and RN who is in the home care office today.    PILAR Roberts, RN  [facility]

## 2022-04-07 NOTE — TELEPHONE ENCOUNTER
Reason for Call:  Home Health Care    [facility] called regarding (reason for call): Verbal Orders    Orders are needed for this patient. Skilled Nurse    PT:     OT:     Skilled Nursing: [facility] for skin integrity     Pt Provider: Malika Wolf    Phone Number Homecare Nurse can be reached at: [phone]    Can we leave a detailed message on this number? YES      Call taken on 4/7/2022 at 12:28 PM by Candy Vences

## 2022-04-07 NOTE — TELEPHONE ENCOUNTER
VO given to Polly for one skilled nursing eval for skin integrity.    CHEN Lopez RN  [facility]

## 2022-04-10 ENCOUNTER — MYC MEDICAL ADVICE (OUTPATIENT)
Dept: FAMILY MEDICINE | Facility: CLINIC | Age: 77
End: 2022-04-10
Payer: MEDICARE

## 2022-04-10 DIAGNOSIS — F33.41 RECURRENT MAJOR DEPRESSIVE DISORDER, IN PARTIAL REMISSION (H): ICD-10-CM

## 2022-04-10 DIAGNOSIS — I25.10 ATHEROSCLEROSIS OF NATIVE CORONARY ARTERY WITHOUT ANGINA PECTORIS, UNSPECIFIED WHETHER NATIVE OR TRANSPLANTED HEART: ICD-10-CM

## 2022-04-11 ENCOUNTER — OFFICE VISIT (OUTPATIENT)
Dept: NEUROPSYCHOLOGY | Facility: CLINIC | Age: 77
End: 2022-04-11
Attending: NURSE PRACTITIONER
Payer: MEDICARE

## 2022-04-11 DIAGNOSIS — G47.33 OBSTRUCTIVE SLEEP APNEA: Primary | Chronic | ICD-10-CM

## 2022-04-11 DIAGNOSIS — G89.29 OTHER CHRONIC PAIN: ICD-10-CM

## 2022-04-11 DIAGNOSIS — F32.A DEPRESSION, UNSPECIFIED DEPRESSION TYPE: ICD-10-CM

## 2022-04-11 DIAGNOSIS — R41.3 MEMORY LOSS: ICD-10-CM

## 2022-04-11 PROCEDURE — 96138 PSYCL/NRPSYC TECH 1ST: CPT | Performed by: PSYCHOLOGIST

## 2022-04-11 PROCEDURE — 90791 PSYCH DIAGNOSTIC EVALUATION: CPT | Performed by: PSYCHOLOGIST

## 2022-04-11 PROCEDURE — 96133 NRPSYC TST EVAL PHYS/QHP EA: CPT | Performed by: PSYCHOLOGIST

## 2022-04-11 PROCEDURE — 96132 NRPSYC TST EVAL PHYS/QHP 1ST: CPT | Performed by: PSYCHOLOGIST

## 2022-04-11 PROCEDURE — 96139 PSYCL/NRPSYC TST TECH EA: CPT | Performed by: PSYCHOLOGIST

## 2022-04-11 RX ORDER — ESCITALOPRAM OXALATE 20 MG/1
TABLET ORAL
Qty: 90 TABLET | Refills: 3 | Status: SHIPPED | OUTPATIENT
Start: 2022-04-11 | End: 2022-05-31 | Stop reason: ALTCHOICE

## 2022-04-11 RX ORDER — ATORVASTATIN CALCIUM 40 MG/1
40 TABLET, FILM COATED ORAL DAILY
Qty: 90 TABLET | Refills: 3 | Status: SHIPPED | OUTPATIENT
Start: 2022-04-11 | End: 2023-04-25

## 2022-04-11 NOTE — NURSING NOTE
The patient was seen for neuropsychological evaluation at the request of CHANTAL Hooper CNP for the purposes of diagnostic clarification and treatment planning.  233 minutes of test administration and scoring were provided by this writer.  Please see Dr. Shreyas Cole's report for a full interpretation of the findings.    Radha Villalobos  Psychometrist

## 2022-04-11 NOTE — PROGRESS NOTES
"NAME  Mariel Ribeiro    MRN  [id number]      46     AGE  76     SEX  Female     HANDEDNESS Right     EDUCATION 16     TOWNSEND  22     PROVIDER       [email or web address]  AJ     STATION  OP            ORIENTATION      Time  -7     Personal Info.     Place      Presidents             TOMM       Trial 1  48     Trial 2  49            WAIS-IV       Digit Span RDS 4            WRAT READING   5   SS  101     %ile  53     Grade Equivalence >12.9            WAIS-IV         Raw SSa    Digit Span  7 1    Similarities 13 6    Coding  27 6           TRAIL MAKING TEST       Time Errors SSa T   A 74 0 3 27   B DC @ 300\" 3             HVLT   1     Raw T    Trial 1  3     Trial 2  1     Trial 3  3     Learning  0     Total Recall 7 <20    Delayed Recall 0 <20    Percent Retention 0% <20    True Positives 7     False Positives 7     Disc. Index  0 <20           JAIR-O COMPLEX FIGURE       Raw T %ile   Time to Copy 600  <1   Copy  35  >16   Short Delay Recall 9.5 45 31   Long Delay Recall 9 43 24   Recognition Total 18 39 14           WMS-IV LOGICAL MEMORY OA     Raw SSa/%ile    LM I  7 2    LM II  0 1    Recognition 13 3-9            NAB NAMING  1   Raw 24 /31     z -1.64      T 22             COWAT FAS       Raw 9      SS 1      T 15             CLOCK DRAWING      Command  Borderline     Copy  Normal            STROOP        Raw T     Word 30 <15     Color 24 8     C/W 3 17     Intf. -10 40            GDS       Raw  13     Interpretation Mild/Moderate        "
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
## 2022-04-14 ENCOUNTER — CARE COORDINATION (OUTPATIENT)
Dept: CARDIOLOGY | Facility: CLINIC | Age: 77
End: 2022-04-14
Payer: MEDICARE

## 2022-04-14 NOTE — PROGRESS NOTES
[facility] - CardioMEMS Reading Review    April 14, 2022   CardioMEMS reviewed.  Current diuretic: Bumex 4 mg daily  Recent plan of care changes: none. PAD in goal range.     Current Threshold parameters:       Today's Waveform:       Readings:           RHC Date Wedge Pressure MEMS PAD during cath  (average of the 3 values)     7/1/2019 w/MEMS implant     20 mmHg   18 mmHg           Rosina Mays RN

## 2022-04-18 ENCOUNTER — ALLIED HEALTH/NURSE VISIT (OUTPATIENT)
Dept: OTHER | Facility: CLINIC | Age: 77
End: 2022-04-18
Payer: MEDICARE

## 2022-04-18 VITALS
BODY MASS INDEX: 49.81 KG/M2 | DIASTOLIC BLOOD PRESSURE: 61 MMHG | OXYGEN SATURATION: 94 % | HEART RATE: 65 BPM | WEIGHT: 293 LBS | SYSTOLIC BLOOD PRESSURE: 127 MMHG

## 2022-04-18 DIAGNOSIS — M54.50 LOWER BACK PAIN: Primary | ICD-10-CM

## 2022-04-18 DIAGNOSIS — Z23 HIGH PRIORITY FOR 2019-NCOV VACCINE: Primary | ICD-10-CM

## 2022-04-18 PROCEDURE — 99207 PR COMMUNITY PARAMEDIC - PATIENT NOT BILLABLE: CPT

## 2022-04-18 NOTE — PROGRESS NOTES
[facility] Follow-up Visit  April 18, 2022  TIME: 1130    Mariel Ribeiro is a 76 year old female being seen at home for a follow-up visit.    Present at appointment:           Chief Complaint   Patient presents with     Outreach       [place] Utilization:      Utilization    Hospital Admissions  0             ED Visits  2             No Show Count (past year)  0                Current as of: 4/15/2022  4:58 PM              /61   Pulse 65   Wt 144.2 kg (318 lb)   SpO2 94%   BMI 49.81 kg/m      Clinical Concerns:  Current Medical Concerns:  Back pain    Current Behavioral Concerns: no    Education Provided to patient: no   Medication set up? No  Pill Box issued: No  Scale issued: No  Flu Shot given: No  COVID Vaccine Given: No  Lab draw or specimen collection: No  Food box issued: No  Collaborative visit with PCP: No  Wound Care: No    Health Maintenance Reviewed:      Clinical Pathway: None    No  Face to Face in Home / Community    Recent blood sugars: 179    Review of Symptoms/PE    Skin: negative  Eyes: negative  Ears/Nose/Throat: negative  Respiratory: No shortness of breath, dyspnea on exertion, cough, or hemoptysis  Cardiovascular: negative  Gastrointestinal: negative  Genitourinary: negative  Musculoskeletal: back pain  Neurologic: numbness or tingling of hands and numbness or tingling of feet  Psychiatric: negative    Pain Management::                 Plan:     Time spent with patient: 45    The patient meets one or more of the following criteria:  * Requires services to prevent readmission to a nursing home or hospital    Next CP visit scheduled: 4/19/22    Issues for Provider to follow up on: Over all the pt is doing well.  She is reporting pain in her tailbone [facility]  Her roommate Odalis sets up all of her medications and is an excellent care taker.  Due to no skilled needs she will be graduated tomorrow after her Covid vaccination  to which she agrees.    Provider follow up visit needed: Multiple upcoming

## 2022-04-19 ENCOUNTER — DOCUMENTATION ONLY (OUTPATIENT)
Dept: FAMILY MEDICINE | Facility: CLINIC | Age: 77
End: 2022-04-19
Payer: MEDICARE

## 2022-04-19 ENCOUNTER — ALLIED HEALTH/NURSE VISIT (OUTPATIENT)
Dept: OTHER | Facility: CLINIC | Age: 77
End: 2022-04-19
Payer: MEDICARE

## 2022-04-19 VITALS
SYSTOLIC BLOOD PRESSURE: 165 MMHG | DIASTOLIC BLOOD PRESSURE: 66 MMHG | TEMPERATURE: 97.3 F | HEART RATE: 65 BPM | OXYGEN SATURATION: 95 %

## 2022-04-19 DIAGNOSIS — Z23 HIGH PRIORITY FOR COVID-19 VACCINATION: Primary | ICD-10-CM

## 2022-04-19 PROCEDURE — 91306 COVID-19,PF,MODERNA (18+ YRS BOOSTER .25ML): CPT

## 2022-04-19 PROCEDURE — 99207 PR COMMUNITY PARAMEDIC - PATIENT NOT BILLABLE: CPT

## 2022-04-19 PROCEDURE — 0064A COVID-19,PF,MODERNA (18+ YRS BOOSTER .25ML): CPT

## 2022-04-20 ENCOUNTER — VIRTUAL VISIT (OUTPATIENT)
Dept: UROLOGY | Facility: CLINIC | Age: 77
End: 2022-04-20
Payer: MEDICARE

## 2022-04-20 ENCOUNTER — CARE COORDINATION (OUTPATIENT)
Dept: CARDIOLOGY | Facility: CLINIC | Age: 77
End: 2022-04-20

## 2022-04-20 VITALS — WEIGHT: 293 LBS | HEIGHT: 68 IN | BODY MASS INDEX: 44.41 KG/M2

## 2022-04-20 DIAGNOSIS — I50.32 CHRONIC DIASTOLIC HEART FAILURE (H): Chronic | ICD-10-CM

## 2022-04-20 DIAGNOSIS — N39.46 MIXED INCONTINENCE: Primary | ICD-10-CM

## 2022-04-20 DIAGNOSIS — R35.0 URINARY FREQUENCY: ICD-10-CM

## 2022-04-20 DIAGNOSIS — E78.5 HYPERLIPIDEMIA WITH TARGET LDL LESS THAN 70: ICD-10-CM

## 2022-04-20 PROCEDURE — 99213 OFFICE O/P EST LOW 20 MIN: CPT | Mod: 95 | Performed by: PHYSICIAN ASSISTANT

## 2022-04-20 RX ORDER — NIACIN 500 MG/1
TABLET, EXTENDED RELEASE ORAL
Qty: 180 TABLET | Refills: 1 | Status: SHIPPED | OUTPATIENT
Start: 2022-04-20 | End: 2022-10-07

## 2022-04-20 RX ORDER — TOLTERODINE 4 MG/1
4 CAPSULE, EXTENDED RELEASE ORAL DAILY
Qty: 90 CAPSULE | Refills: 4 | Status: ON HOLD | OUTPATIENT
Start: 2022-04-20 | End: 2022-07-16

## 2022-04-20 ASSESSMENT — PAIN SCALES - GENERAL: PAINLEVEL: MILD PAIN (2)

## 2022-04-20 NOTE — TELEPHONE ENCOUNTER
Antihyperlipidemic agents Passed 04/20/2022 06:12 AM   Protocol Details  Lipid panel on file in past 12 mos    Normal serum ALT on record in past 12 mos    Recent (12 mo) or future (30 days) visit within the authorizing provider's specialty    Medication is active on med list    Patient is age 18 years or older    No active pregnancy on record    No positive pregnancy test in past 12 mos

## 2022-04-20 NOTE — PROGRESS NOTES
[facility] Follow-up Visit  April 29, 2022  TIME: 1330    Mariel Ribeiro is a 76 year old female being seen at home for a follow-up visit.    Present at appointment:           Chief Complaint   Patient presents with     Immunization     Covid Booster       [place] Utilization:      Utilization    Hospital Admissions  0             ED Visits  2             No Show Count (past year)  0                Current as of: 4/20/2022  9:41 AM              BP (!) 165/66   Pulse 65   Temp 97.3  F (36.3  C)   SpO2 95%     Clinical Concerns:  Current Medical Concerns:  Need for Covid booster    Current Behavioral Concerns: no    Education Provided to patient: no   Medication set up? No  Pill Box issued: No  Scale issued: No  Flu Shot given: No  COVID Vaccine Given: Yes  Lab draw or specimen collection: No  Food box issued: No  Collaborative visit with PCP: No  Wound Care: No    Health Maintenance Reviewed:      Clinical Pathway: None    No  Face to Face in Home / Community      Review of Symptoms/PE    Skin: negative  Eyes: negative  Ears/Nose/Throat: negative  Respiratory: No shortness of breath, dyspnea on exertion, cough, or hemoptysis  Cardiovascular: negative  Gastrointestinal: negative  Genitourinary: negative  Musculoskeletal: negative  Neurologic: negative  Psychiatric: negative    Pain Management::                 Plan:     Time spent with patient: 30    The patient meets one or more of the following criteria:  * Requires services to prevent readmission to a nursing home or hospital      Next CP visit scheduled: none     Issues for Provider to follow up on: A Covid vaccination was administered with out issues.  I waited for 15 minutes after and reactions noted.  Mariel is being graduated from the [facility] program due to no further skilled needs.    Provider follow up visit needed: Multiple upcoming

## 2022-04-20 NOTE — PROGRESS NOTES
[facility] - CardioMEMS Reading Review    April 20, 2022   CardioMEMS reviewed.  Current diuretic: Bumex 4 mg daily  Recent plan of care changes: none. PAD in goal range.    Current Threshold parameters:       Today's Waveform:       Readings:           RHC Date Wedge Pressure MEMS PAD during cath  (average of the 3 values)     7/1/2019 w/MEMS implant     20 mmHg   18 mmHg           Rosina Mays RN
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
## 2022-04-21 ENCOUNTER — OFFICE VISIT (OUTPATIENT)
Dept: ANESTHESIOLOGY | Facility: CLINIC | Age: 77
End: 2022-04-21
Payer: MEDICARE

## 2022-04-21 VITALS
HEART RATE: 62 BPM | DIASTOLIC BLOOD PRESSURE: 81 MMHG | SYSTOLIC BLOOD PRESSURE: 125 MMHG | OXYGEN SATURATION: 96 % | TEMPERATURE: 97.8 F

## 2022-04-21 DIAGNOSIS — M79.18 MYOFASCIAL PAIN: Primary | ICD-10-CM

## 2022-04-21 PROCEDURE — 99213 OFFICE O/P EST LOW 20 MIN: CPT | Performed by: ANESTHESIOLOGY

## 2022-04-21 NOTE — NURSING NOTE
RN reviewed AVS with patient. Patient to contact clinic if any questions/concerns. Patient verbalized understanding.    Stephanie Cuellar RN

## 2022-04-21 NOTE — LETTER
4/21/2022       RE: Mariel Ribeiro  [address] [place] [address]     Dear Colleague,    Thank you for referring your patient, Mariel Ribeiro, to the [facility] [place] at [facility]. Please see a copy of my visit note below.    [facility]    Date of visit: 4/21/2022    Chief complaint:   Chief Complaint   Patient presents with     Follow Up     Pt reports ongoing concerns r/l knees,tailbone pain for several weeks       Interval history:  Mariel Ribeiro was last seen by me on 1/14/2022.      Recommendations/plan at the last visit included:  1. Physical Therapy: continue daily HEPs  2. Pain Psychologist to address issues of relaxation, behavioral change, coping style, and other factors important to improvement: not at this time  3. Diagnostic Studies: none  4. Medication Management: continue current regimen  5. Further procedures recommended: Ganglion impar block  6. Follow up: 6 to 8 weeks after injection      Since her last visit, Mariel Ribeiro reports:    Underwent Ganglion Impar injection on 1/28/2022  Using Purple cushion that does help a bit but otherwise continues to have significant coccyx pain  The injection only worked for a short period of time.     Pain scores:  Pain intensity on average is 7 on a scale of 0-10.     Current pain treatments:     Tylenol  Lyrica for fibromyalgia   Tramadol 50 mg       Medications:  Current Outpatient Medications   Medication Sig Dispense Refill     acetaminophen (TYLENOL) 325 MG tablet Take 650 mg by mouth every 4 hours as needed for mild pain Take 2 tablets by mouth every 4 hours as needed for mild pain 100 tablet      albuterol (PROAIR HFA/PROVENTIL HFA/VENTOLIN HFA) 108 (90 Base) MCG/ACT inhaler INHALE TWO PUFFS INTO LUNGS EVERY SIX HOURS 25.5 g 9     anastrozole (ARIMIDEX) 1 MG tablet Take 1 tablet (1 mg) by mouth daily 90 tablet 3      aspirin (ASA) 81 MG EC tablet Take 1 tablet (81 mg) by mouth daily       atorvastatin (LIPITOR) 40 MG tablet Take 1 tablet (40 mg) by mouth in the morning. 90 tablet 3     blood glucose (ONETOUCH ULTRA) test strip Use to test blood sugar four times daily or as directed. 3 Box 3     bumetanide (BUMEX) 1 MG tablet 4 mg daily 360 tablet 1     buPROPion (WELLBUTRIN XL) 150 MG 24 hr tablet Take 1 tablet (150 mg) by mouth every morning 30 tablet 3     capsaicin (ZOSTRIX) 0.025 % external cream Apply 1 g topically 3 times daily 120 g 1     capsaicin (ZOSTRIX) 0.025 % external cream Apply 1 g topically 3 times daily For neuropathic pain. 60 g 1     Continuous Blood Gluc  (FREESTYLE MARTY 14 DAY READER) JEZ 1 Units 4 times daily (before meals and nightly) 1 Device 0     Continuous Blood Gluc Sensor (FREESTYLE MARTY 14 DAY SENSOR) [facility] NEW SENSOR TO BACK OF ARM EVERY 14 DAYS TO MONITOR BLOOD SUGARS 2 each 4     Continuous Blood Gluc Sensor (FREESTYLE MARTY 14 DAY SENSOR) [facility] new sensor to back of arm q14 days to monitor blood sugars 6 each 3     EPINEPHrine (ANY BX GENERIC EQUIV) 0.3 MG/0.3ML injection 2-pack Inject 0.3 mLs (0.3 mg) into the muscle once as needed for anaphylaxis 0.6 mL 3     escitalopram (LEXAPRO) 20 MG tablet TAKE 1 TABLET(20 MG) BY MOUTH EVERY MORNING 90 tablet 3     ferrous gluconate (FERGON) 324 (38 Fe) MG tablet TAKE 1 TABLET BY MOUTH EVERY MONDAY, WEDNESDAY, AND FRIDAY MORNING 36 tablet 2     folic acid (FOLVITE) 1 MG tablet Take 2 tablets (2 mg) by mouth daily       guaiFENesin (MUCINEX) 600 MG 12 hr tablet Take 1 tablet (600 mg) by mouth 2 times daily       insulin glargine (LANTUS SOLOSTAR) 100 UNIT/ML pen ADMINISTER 31 UNITS UNDER THE SKIN DAILY. CALL IF BLOOD SUGAR<70, OFTEN>200 15 mL 1     levothyroxine (SYNTHROID) 150 MCG tablet Take 1 tablet (150 mcg) by mouth daily 90 tablet 2     [employer]CAN FINEPOINT LANCETS MISC Use to test blood sugars 2 times daily or as directed. 100  "each prn     losartan (COZAAR) 50 MG tablet TAKE 1/2 TABLET(25 MG) BY MOUTH DAILY 45 tablet 0     methylPREDNISolone (MEDROL DOSEPAK) 4 MG tablet therapy pack Follow Package Directions 21 tablet 0     metoprolol succinate ER (TOPROL-XL) 25 MG 24 hr tablet Take 0.5 tablets (12.5 mg) by mouth every morning AND 1 tablet (25 mg) every evening. 135 tablet 3     miconazole (MICATIN/MICRO GUARD) 2 % external powder Apply topically as needed for itching or other Redness in bilateral groin and  clef 43 g 0     niacin ER (NIASPAN) 500 MG CR tablet TAKE 1 TABLET(500 MG) BY MOUTH TWICE DAILY 180 tablet 1     nitroGLYcerin (NITROSTAT) 0.4 MG sublingual tablet For chest pain place 1 tablet under the tongue every 5 minutes for 3 doses. If symptoms persist 5 minutes after 1st dose call 911. 25 tablet 1     nystatin POWD 1 Dose 4 times daily 1 each 1     NYSTOP 772033 UNIT/GM powder 1 Dose       ONE TOUCH ULTRA (DEVICES) MISC test blood sugar BID 1 0     potassium chloride ER (KLOR-CON M) 10 MEQ CR tablet Take 2 tablets (20 mEq) by mouth 2 times daily 120 tablet 6     pregabalin (LYRICA) 100 MG capsule TAKE 1 CAPSULE(100 MG) BY MOUTH TWICE DAILY 180 capsule 1     repaglinide (PRANDIN) 1 MG tablet Take 1 tablet (1 mg) by mouth 3 times daily (before meals) 180 tablet 3     Skin Protectants, Misc. ([facility] AG TEXTILE 10\"X144\") SHEE Externally apply 1 Box topically daily Apply to areas of intertrigo prn 1 each 3     tolterodine ER (DETROL LA) 2 MG 24 hr capsule Take 1 capsule (2 mg) by mouth daily 30 capsule 0     tolterodine ER (DETROL LA) 2 MG 24 hr capsule TAKE 2 CAPSULES(4 MG) BY MOUTH DAILY 180 capsule 0     tolterodine ER (DETROL LA) 4 MG 24 hr capsule Take 1 capsule (4 mg) by mouth daily 90 capsule 4     traMADol (ULTRAM) 50 MG tablet Take 1 tablet (50 mg) by mouth every 6 hours as needed for breakthrough pain or severe pain 60 tablet 0     traZODone (DESYREL) 50 MG tablet TAKE 3 TABLETS(150 MG) BY MOUTH AT BEDTIME 270 " tablet 1     bacitracin 500 UNIT/GM external ointment Apply ointment to the incision sites three times a day. (Patient not taking: Reported on 4/21/2022) 28 g 0     gabapentin (NEURONTIN) 100 MG capsule Take 1 capsule (100 mg) by mouth 3 times daily (Patient not taking: Reported on 4/21/2022) 90 capsule 11     pregabalin (LYRICA) 100 MG capsule Take 1 capsule (100 mg) by mouth 3 times daily 90 capsule 0       Medical History: any changes in medical history since they were last seen? No    Review of Systems:  The 14 system ROS was reviewed from the intake questionnaire, and is positive for: coccyx pain  Any bowel or bladder problems: denies  Mood: stable    Physical Exam:   Blood pressure 125/81, pulse 62, temperature 97.8  F (36.6  C), temperature source Oral, SpO2 96 %, not currently breastfeeding.  General: AAOx3, NAD  Gait: Antalgic  MSK exam: TTP in tailbone    Assessment:   Coccydynia    Mariel Ribeiro is a 76 year old female who is seen at the pain clinic for follow up after undergoing ganglion impar block that only provided minimal relief and she continues to experience prolonged pain. She will follow up with her PCP for further options and may even consider discussion of any possible surgical interventions.     Plan:  1. Physical Therapy:  Continue daily HEPs  2. Clinical Health Psychologist to address issues of relaxation, behavioral change, coping style, and other factors important to improvement.  Not interested at this time  3. Diagnostic Studies:  none  4. Medication Management:  Continue current regimen, can consider increase of  Tramadol if agreeable by PCP (prescriber)  5. Further procedures recommended: none  6. Follow up: as needed        Lis Mcneil MD    Pain Medicine  Department of Anesthesiology  [facility]

## 2022-04-21 NOTE — PATIENT INSTRUCTIONS
Medications:    Discuss with PCP about medication management.     Okay to continue current medication regimen.         Treatment planning:    TENS Unit ordered.     [employer]:        [facility] [employer] [facility] [place]  [address], [address]  [place], [place] [id number] [phone]          [facility] [place]  [address] [place], [place] [id number] [phone]        [facility] - [place]  [address] [place], [place] [id number] [phone]        [facility] - [place], [place] [address] [phone]        [facility] [place] [place] [address] [phone]        [facility] - [place], [place] [id number] [phone]       Recommended Follow up:      Follow up as needed.        To speak with a nurse, schedule/reschedule/cancel a clinic appointment, or request a medication refill call: [phone], option #1.    You can also reach us by [email or web address]: [email or web address]

## 2022-04-21 NOTE — PROGRESS NOTES
[facility]    Date of visit: 4/21/2022    Chief complaint:   Chief Complaint   Patient presents with     Follow Up     Pt reports ongoing concerns r/l knees,tailbone pain for several weeks       Interval history:  Mariel Ribeiro was last seen by me on 1/14/2022.      Recommendations/plan at the last visit included:  1. Physical Therapy: continue daily HEPs  2. Pain Psychologist to address issues of relaxation, behavioral change, coping style, and other factors important to improvement: not at this time  3. Diagnostic Studies: none  4. Medication Management: continue current regimen  5. Further procedures recommended: Ganglion impar block  6. Follow up: 6 to 8 weeks after injection      Since her last visit, Mariel Ribeiro reports:    Underwent Ganglion Impar injection on 1/28/2022  Using Purple cushion that does help a bit but otherwise continues to have significant coccyx pain  The injection only worked for a short period of time.     Pain scores:  Pain intensity on average is 7 on a scale of 0-10.     Current pain treatments:     Tylenol  Lyrica for fibromyalgia   Tramadol 50 mg       Medications:  Current Outpatient Medications   Medication Sig Dispense Refill     acetaminophen (TYLENOL) 325 MG tablet Take 650 mg by mouth every 4 hours as needed for mild pain Take 2 tablets by mouth every 4 hours as needed for mild pain 100 tablet      albuterol (PROAIR HFA/PROVENTIL HFA/VENTOLIN HFA) 108 (90 Base) MCG/ACT inhaler INHALE TWO PUFFS INTO LUNGS EVERY SIX HOURS 25.5 g 9     anastrozole (ARIMIDEX) 1 MG tablet Take 1 tablet (1 mg) by mouth daily 90 tablet 3     aspirin (ASA) 81 MG EC tablet Take 1 tablet (81 mg) by mouth daily       atorvastatin (LIPITOR) 40 MG tablet Take 1 tablet (40 mg) by mouth in the morning. 90 tablet 3     blood glucose (ONETOUCH ULTRA) test strip Use to test blood sugar four times daily or as directed. 3 Box 3     bumetanide (BUMEX) 1 MG tablet 4 mg daily 360 tablet 1      buPROPion (WELLBUTRIN XL) 150 MG 24 hr tablet Take 1 tablet (150 mg) by mouth every morning 30 tablet 3     capsaicin (ZOSTRIX) 0.025 % external cream Apply 1 g topically 3 times daily 120 g 1     capsaicin (ZOSTRIX) 0.025 % external cream Apply 1 g topically 3 times daily For neuropathic pain. 60 g 1     Continuous Blood Gluc  (FREESTYLE MARTY 14 DAY READER) JEZ 1 Units 4 times daily (before meals and nightly) 1 Device 0     Continuous Blood Gluc Sensor (FREESTYLE MARTY 14 DAY SENSOR) [facility] [place] NEW SENSOR TO BACK OF ARM EVERY 14 DAYS TO MONITOR BLOOD SUGARS 2 each 4     Continuous Blood Gluc Sensor (FREESTYLE MARTY 14 DAY SENSOR) [facility] new sensor to back of arm q14 days to monitor blood sugars 6 each 3     EPINEPHrine (ANY BX GENERIC EQUIV) 0.3 MG/0.3ML injection 2-pack Inject 0.3 mLs (0.3 mg) into the muscle once as needed for anaphylaxis 0.6 mL 3     escitalopram (LEXAPRO) 20 MG tablet TAKE 1 TABLET(20 MG) BY MOUTH EVERY MORNING 90 tablet 3     ferrous gluconate (FERGON) 324 (38 Fe) MG tablet TAKE 1 TABLET BY MOUTH EVERY MONDAY, WEDNESDAY, AND FRIDAY MORNING 36 tablet 2     folic acid (FOLVITE) 1 MG tablet Take 2 tablets (2 mg) by mouth daily       guaiFENesin (MUCINEX) 600 MG 12 hr tablet Take 1 tablet (600 mg) by mouth 2 times daily       insulin glargine (LANTUS SOLOSTAR) 100 UNIT/ML pen ADMINISTER 31 UNITS UNDER THE SKIN DAILY. CALL IF BLOOD SUGAR<70, OFTEN>200 15 mL 1     levothyroxine (SYNTHROID) 150 MCG tablet Take 1 tablet (150 mcg) by mouth daily 90 tablet 2     LIFESCAN FINEPOINT LANCETS MISC Use to test blood sugars 2 times daily or as directed. 100 each prn     losartan (COZAAR) 50 MG tablet TAKE 1/2 TABLET(25 MG) BY MOUTH DAILY 45 tablet 0     methylPREDNISolone (MEDROL DOSEPAK) 4 MG tablet therapy pack Follow Package Directions 21 tablet 0     metoprolol succinate ER (TOPROL-XL) 25 MG 24 hr tablet Take 0.5 tablets (12.5 mg) by mouth every morning AND 1 tablet (25 mg) every evening.  "135 tablet 3     miconazole (MICATIN/MICRO GUARD) 2 % external powder Apply topically as needed for itching or other Redness in bilateral groin and katharine clef 43 g 0     niacin ER (NIASPAN) 500 MG CR tablet TAKE 1 TABLET(500 MG) BY MOUTH TWICE DAILY 180 tablet 1     nitroGLYcerin (NITROSTAT) 0.4 MG sublingual tablet For chest pain place 1 tablet under the tongue every 5 minutes for 3 doses. If symptoms persist 5 minutes after 1st dose call 911. 25 tablet 1     nystatin POWD 1 Dose 4 times daily 1 each 1     NYSTOP 167775 UNIT/GM powder 1 Dose       ONE TOUCH ULTRA (DEVICES) MISC test blood sugar BID 1 0     potassium chloride ER (KLOR-CON M) 10 MEQ CR tablet Take 2 tablets (20 mEq) by mouth 2 times daily 120 tablet 6     pregabalin (LYRICA) 100 MG capsule TAKE 1 CAPSULE(100 MG) BY MOUTH TWICE DAILY 180 capsule 1     repaglinide (PRANDIN) 1 MG tablet Take 1 tablet (1 mg) by mouth 3 times daily (before meals) 180 tablet 3     Skin Protectants, Misc. ([facility] [employer] TEXTILE 10\"X144\") SHEE Externally apply 1 Box topically daily Apply to areas of intertrigo prn 1 each 3     tolterodine ER (DETROL LA) 2 MG 24 hr capsule Take 1 capsule (2 mg) by mouth daily 30 capsule 0     tolterodine ER (DETROL LA) 2 MG 24 hr capsule TAKE 2 CAPSULES(4 MG) BY MOUTH DAILY 180 capsule 0     tolterodine ER (DETROL LA) 4 MG 24 hr capsule Take 1 capsule (4 mg) by mouth daily 90 capsule 4     traMADol (ULTRAM) 50 MG tablet Take 1 tablet (50 mg) by mouth every 6 hours as needed for breakthrough pain or severe pain 60 tablet 0     traZODone (DESYREL) 50 MG tablet TAKE 3 TABLETS(150 MG) BY MOUTH AT BEDTIME 270 tablet 1     bacitracin 500 UNIT/GM external ointment Apply ointment to the incision sites three times a day. (Patient not taking: Reported on 2022) 28 g 0     gabapentin (NEURONTIN) 100 MG capsule Take 1 capsule (100 mg) by mouth 3 times daily (Patient not taking: Reported on 2022) 90 capsule 11     pregabalin (LYRICA) 100 MG capsule " Take 1 capsule (100 mg) by mouth 3 times daily 90 capsule 0       Medical History: any changes in medical history since they were last seen? No    Review of Systems:  The 14 system ROS was reviewed from the intake questionnaire, and is positive for: coccyx pain  Any bowel or bladder problems: denies  Mood: stable    Physical Exam:   Blood pressure 125/81, pulse 62, temperature 97.8  F (36.6  C), temperature source Oral, SpO2 96 %, not currently breastfeeding.  General: AAOx3, NAD  Gait: Antalgic  MSK exam: TTP in tailbone    Assessment:   Coccydynia    Mariel Ribeiro is a 76 year old female who is seen at the pain clinic for follow up after undergoing ganglion impar block that only provided minimal relief and she continues to experience prolonged pain. She will follow up with her PCP for further options and may even consider discussion of any possible surgical interventions.     Plan:  1. Physical Therapy:  Continue daily HEPs  2. Clinical Health Psychologist to address issues of relaxation, behavioral change, coping style, and other factors important to improvement.  Not interested at this time  3. Diagnostic Studies:  none  4. Medication Management:  Continue current regimen, can consider increase of  Tramadol if agreeable by PCP (prescriber)  5. Further procedures recommended: none  6. Follow up: as needed    Lis Mcneil MD    Pain Medicine  Department of Anesthesiology  [facility]

## 2022-04-21 NOTE — NURSING NOTE
Chief Complaint   Patient presents with     Follow Up     Pt reports ongoing concerns r/l knees,tailbone pain for several weeks

## 2022-04-22 RX ORDER — LOSARTAN POTASSIUM 50 MG/1
TABLET ORAL
Qty: 45 TABLET | Refills: 0 | OUTPATIENT
Start: 2022-04-22

## 2022-04-22 RX ORDER — POTASSIUM CHLORIDE 750 MG/1
TABLET, EXTENDED RELEASE ORAL
Qty: 120 TABLET | Refills: 6 | OUTPATIENT
Start: 2022-04-22

## 2022-04-22 NOTE — TELEPHONE ENCOUNTER
Dup   POTASSIUM CL MICRO 10MEQ ER TABS last rx: 11-9-21 for 120 tab w/6 RF  losartan (COZAAR) 50 MG tablet Last rx: 3-2-22 for 45 tab w/0 RF

## 2022-04-27 ENCOUNTER — CARE COORDINATION (OUTPATIENT)
Dept: CARDIOLOGY | Facility: CLINIC | Age: 77
End: 2022-04-27
Payer: MEDICARE

## 2022-04-27 NOTE — PROGRESS NOTES
[facility] - CardioMEMS Reading Review    April 27, 2022   CardioMEMS reviewed.  Current diuretic: Bumex 4 mg daily  Recent plan of care changes: none. Noted reading sent yesterday with questionable waveform. Mychart sent to check in.     Current Threshold parameters:       Today's Waveform:       Readings:           RHC Date Wedge Pressure MEMS PAD during cath  (average of the 3 values)     7/1/2019 w/MEMS implant     20 mmHg   18 mmHg           Rosina Mays RN

## 2022-05-02 NOTE — PLAN OF CARE
Admission          8/15/2018 12:27 PM  -----------------------------------------------------------  Reason for admission: syncopal episode   Primary team notified of pt arrival.  Admitted from: cardiac clinic  Via: wheelchair  Accompanied by: family  Belongings: Placed in closet; valuables sent home with family  Admission Profile: complete  Teaching: orientation to unit and call light- call light within reach, call don't fall, use of console, meal times, when to call for the RN, and enforced importance of safety   Access: PIV  Telemetry:Placed on pt  Ht./Wt.: complete  2 RN Skin Assessment Completed By: Nicky, pt refused to remove lymph edema wraps so skin was not assessed under wraps   Pt status: Pt AOx4, VSS, afebrile, on RA sats > 95%.     Temp:  [97.6  F (36.4  C)-98.5  F (36.9  C)] 97.6  F (36.4  C)  Pulse:  [63] 63  Heart Rate:  [66-81] (P) 66  Resp:  [14-20] (P) 20  BP: ()/(63-80) 161/66  SpO2:  [92 %-95 %] 95 %     Keystone Flap Text: The defect edges were debeveled with a #15 scalpel blade.  Given the location of the defect, shape of the defect a keystone flap was deemed most appropriate.  Using a sterile surgical marker, an appropriate keystone flap was drawn incorporating the defect, outlining the appropriate donor tissue and placing the expected incisions within the relaxed skin tension lines where possible. The area thus outlined was incised deep to adipose tissue with a #15 scalpel blade.  The skin margins were undermined to an appropriate distance in all directions around the primary defect and laterally outward around the flap utilizing iris scissors.

## 2022-05-03 ENCOUNTER — ALLIED HEALTH/NURSE VISIT (OUTPATIENT)
Dept: CARDIOLOGY | Facility: CLINIC | Age: 77
End: 2022-05-03
Attending: NURSE PRACTITIONER
Payer: MEDICARE

## 2022-05-03 ENCOUNTER — CARE COORDINATION (OUTPATIENT)
Dept: CARDIOLOGY | Facility: CLINIC | Age: 77
End: 2022-05-03
Payer: MEDICARE

## 2022-05-03 DIAGNOSIS — I50.32 CHRONIC DIASTOLIC HEART FAILURE (H): Primary | ICD-10-CM

## 2022-05-03 PROCEDURE — 93264 REM MNTR WRLS P-ART PRS SNR: CPT | Performed by: NURSE PRACTITIONER

## 2022-05-03 NOTE — PROGRESS NOTES
[facility] - CardioMEMS Reading Review    May 3, 2022   CardioMEMS reviewed.  Current diuretic: Bumex 4 mg daily  Recent plan of care changes: none. PAD in goal range.     Current Threshold parameters:       Today's Waveform:       Readings:           RHC Date Wedge Pressure MEMS PAD during cath  (average of the 3 values)     7/1/2019 w/MEMS implant     20 mmHg   18 mmHg           Rosina Mays RN

## 2022-05-04 NOTE — PROGRESS NOTES
Remote monitoring complete for billing cycle of 4/4/22 to 5/3/22. Please reference care coordination encounters from the following dates for more detail:    4/5/22  4/14/22  4/20/22  4/27/22  5/3/22.     No changes to diuretic made during this cycle, PAD has overall been in goal range.  Rosina Mays RN

## 2022-05-06 NOTE — PROGRESS NOTES
Remote monitoring of Pulmonary Artery Pressures via CardioMEMS device reviewed at least weekly and as needed with CORE nursing. Diuretics adjusted accordingly.    Billing 4/4/22 through 5/3/22    Austin CORNEJO NP-C

## 2022-05-09 ENCOUNTER — MYC MEDICAL ADVICE (OUTPATIENT)
Dept: FAMILY MEDICINE | Facility: CLINIC | Age: 77
End: 2022-05-09
Payer: MEDICARE

## 2022-05-09 DIAGNOSIS — I50.32 CHRONIC DIASTOLIC HEART FAILURE (H): Chronic | ICD-10-CM

## 2022-05-09 DIAGNOSIS — G89.29 OTHER CHRONIC PAIN: ICD-10-CM

## 2022-05-10 RX ORDER — TRAMADOL HYDROCHLORIDE 50 MG/1
50 TABLET ORAL EVERY 6 HOURS PRN
Qty: 60 TABLET | Refills: 0 | Status: ON HOLD | OUTPATIENT
Start: 2022-05-10 | End: 2022-07-16

## 2022-05-10 RX ORDER — POTASSIUM CHLORIDE 750 MG/1
20 TABLET, EXTENDED RELEASE ORAL 2 TIMES DAILY
Qty: 360 TABLET | Refills: 3 | Status: SHIPPED | OUTPATIENT
Start: 2022-05-10 | End: 2023-04-05

## 2022-05-10 NOTE — TELEPHONE ENCOUNTER
Routing refill request to provider for review/approval because:  Drug not on the [facility] refill protocol       Last Written Prescription Date:  1/13/22  Last Fill Quantity: 60,  # refills: 0   Last office visit: 11/23/2021 with prescribing provider:     Future Office Visit:    Ting Andrade RN  [facility]

## 2022-05-10 NOTE — TELEPHONE ENCOUNTER
Last Clinic Visit: 3/16/2022  [facility]

## 2022-05-11 ENCOUNTER — CARE COORDINATION (OUTPATIENT)
Dept: CARDIOLOGY | Facility: CLINIC | Age: 77
End: 2022-05-11
Payer: MEDICARE

## 2022-05-11 NOTE — PROGRESS NOTES
[facility] - CardioMEMS Reading Review    May 11, 2022   CardioMEMS reviewed.  Current diuretic: Bumex 4 mg daily  Recent plan of care changes: none. PAD in goal range.     Current Threshold parameters:       Today's Waveform:       Readings:           RHC Date Wedge Pressure MEMS PAD during cath  (average of the 3 values)     7/1/2019 w/MEMS implant     20 mmHg   18 mmHg           Rosina Mays RN

## 2022-05-13 ENCOUNTER — MYC MEDICAL ADVICE (OUTPATIENT)
Dept: CARE COORDINATION | Facility: CLINIC | Age: 77
End: 2022-05-13
Payer: MEDICARE

## 2022-05-13 ENCOUNTER — PATIENT OUTREACH (OUTPATIENT)
Dept: NURSING | Facility: CLINIC | Age: 77
End: 2022-05-13
Payer: MEDICARE

## 2022-05-13 ENCOUNTER — TELEPHONE (OUTPATIENT)
Dept: CARE COORDINATION | Facility: CLINIC | Age: 77
End: 2022-05-13

## 2022-05-13 ASSESSMENT — ACTIVITIES OF DAILY LIVING (ADL): DEPENDENT_IADLS:: SHOPPING

## 2022-05-13 NOTE — PROGRESS NOTES
Clinic Care Coordination Contact    Writer reached out to patient and her roommate/caregiver via AMDL with the following message:    Chuckie Floyd and Odalis,   I wanted to reach out and see if there was anything you needed regarding primary care this month? I hope all is going well with home care. Feel free to respond directly to this message or call me at [phone] if you have any questions or concerns at this time. If not, I will reach out in 1 month.   Take care!  Peggy Raines, RN Care Coordinator    Writer will follow up in 1 month.   Due to AMDL outreach, goals not discussed and medications not reviewed.     Peggy Raines, RN Care Coordinator     [facility]  [facility]  [email or web address]    Office: [phone]

## 2022-05-13 NOTE — TELEPHONE ENCOUNTER
Chuckie Floyd and Odalis,   I wanted to reach out and see if there was anything you needed regarding primary care this month? I hope all is going well with home care. Feel free to respond directly to this message or call me at [phone] if you have any questions or concerns at this time. If not, I will reach out in 1 month.   Take care!  Peggy Raines RN Care Coordinator

## 2022-05-18 ENCOUNTER — CARE COORDINATION (OUTPATIENT)
Dept: CARDIOLOGY | Facility: CLINIC | Age: 77
End: 2022-05-18
Payer: MEDICARE

## 2022-05-18 NOTE — PROGRESS NOTES
[facility] - CardioMEMS Reading Review    May 18, 2022   CardioMEMS reviewed.  Current diuretic: Bumex 4 mg daily  Recent plan of care changes: none. PAD in goal range.     Current Threshold parameters:       Today's Waveform:       Readings:           RHC Date Wedge Pressure MEMS PAD during cath  (average of the 3 values)     7/1/2019 w/MEMS implant     20 mmHg   18 mmHg           Rosina Mays RN

## 2022-05-25 ENCOUNTER — CARE COORDINATION (OUTPATIENT)
Dept: CARDIOLOGY | Facility: CLINIC | Age: 77
End: 2022-05-25
Payer: MEDICARE

## 2022-05-25 NOTE — PROGRESS NOTES
[facility] - CardioMEMS Reading Review    May 25, 2022   CardioMEMS reviewed.  Current diuretic: Bumex 4 mg daily  Recent plan of care changes: none. PAD low last couple days, will review with provider.     Current Threshold parameters:       Today's Waveform:       Readings:           RHC Date Wedge Pressure MEMS PAD during cath  (average of the 3 values)     7/1/2019 w/MEMS implant     20 mmHg   18 mmHg           Rosina Mays RN

## 2022-05-25 NOTE — PROGRESS NOTES
Reviewed with Austin Miguel NP who recommends the following:  Date: 5/25/2022    Time of Call: 3:03 PM     Diagnosis:  HF     [ VORB ] Ordering provider: Austin Miguel NP  Order: Decrease Bumex to 3 mg daily for 2 days, then return to 4 mg daily.      Order received by: Rosina Mays RN      Follow-up/additional notes: Reviewed with Jayson who verbalized understanding.

## 2022-05-31 ENCOUNTER — VIRTUAL VISIT (OUTPATIENT)
Dept: FAMILY MEDICINE | Facility: CLINIC | Age: 77
End: 2022-05-31
Payer: MEDICARE

## 2022-05-31 DIAGNOSIS — G89.4 CHRONIC PAIN SYNDROME: Primary | ICD-10-CM

## 2022-05-31 DIAGNOSIS — F33.41 RECURRENT MAJOR DEPRESSIVE DISORDER, IN PARTIAL REMISSION (H): ICD-10-CM

## 2022-05-31 DIAGNOSIS — G47.00 INSOMNIA, UNSPECIFIED TYPE: ICD-10-CM

## 2022-05-31 PROCEDURE — 99215 OFFICE O/P EST HI 40 MIN: CPT | Mod: 95 | Performed by: NURSE PRACTITIONER

## 2022-05-31 RX ORDER — EPINEPHRINE 0.3 MG/.3ML
0.3 INJECTION SUBCUTANEOUS
Qty: 0.6 ML | Refills: 3 | Status: CANCELLED | OUTPATIENT
Start: 2022-05-31

## 2022-05-31 RX ORDER — DULOXETIN HYDROCHLORIDE 30 MG/1
30 CAPSULE, DELAYED RELEASE ORAL 2 TIMES DAILY
Qty: 180 CAPSULE | Refills: 1 | Status: SHIPPED | OUTPATIENT
Start: 2022-05-31 | End: 2023-01-05

## 2022-05-31 NOTE — PATIENT INSTRUCTIONS
Plan:  -Taper off of the lexapro by reducing dose to 1/2 tablet (10mg) for one week while starting Cymbalta (30mg) 1 tablet daily for a week.  After week one, can discontinue Lexapro and increase Cymbalta to 1 tablet 2 times a day.      -Taper off trazodone by reducing 1 tablet each week, can add in Melatonin as needed.    -Tylenol 1000mg scheduled 4 times a day for a maximum daily dose of 4,000mg.

## 2022-05-31 NOTE — PROGRESS NOTES
Mariel is a 77 year old who is being evaluated via a billable video visit.      How would you like to obtain your AVS? MyChart  If the video visit is dropped, the invitation should be resent by: Text to cell phone: Text to cell phone: [phone]-Odalis, roommate  Will anyone else be joining your video visit? yes      Video Start Time: 1743    Assessment & Plan     Chronic pain syndrome  We had a long discussion today about managing her chronic pain and that ultimately we may not be able to completely resolve it.  I did discuss my concerns about adding on additional medications that may increase her risk of falling as she is already high risk.  I would still like her to see neurology regarding her headaches.  We discussed for her coccyx pain, avoiding direct pressure.  I suggested she avoid prolonged sitting and I think it is reasonable to continue using her purple pillow.  We also discussed while seated, alternating positions frequently to avoid prolonged direct pressure.  Also suggested she try body pillow at night to help maintain body  alignment.  I also recommended that she schedule her Tylenol 4 times daily with a maximum daily dose of 4 g.  Given her long standing history of pain, we discussed switching her antianxiety medication over to Cymbalta.  In general her anxiety has been well controlled and in remission.  We discussed a tapering strategy of decreasing Lexapro to 10 mg for 1 week while initiating 30 mg of Cymbalta daily and discontinuing Lexapro on week 2 and increasing Cymbalta to 30 mg twice daily.  I did also provide refills of her Lyrica.  At this time would like to limit tramadol to see how she responds to the medication adjustments we made today.    Recurrent major depressive disorder, in partial remission (H)  See above.  Patient agreeable to transition to Cymbalta.  - DULoxetine (CYMBALTA) 30 MG capsule; Take 1 capsule (30 mg) by mouth 2 times daily    Insomnia, unspecified type  As discussed  "above, I am concerned about the amount of medications the patient is on with her high fall risk.  I am also not convinced that she requires trazodone as she is sleeping between 11 and 12 hours at night.  We discussed increasing her daily activity and trying to stay awake during those hours I would like her to wean off her trazodone.  She may receive some benefit of sleep with her Cymbalta and improve pain control.  I am hopeful that increasing her Tylenol will also help with some of her comfort at night.  Mariel was agreeable to trying to decrease her trazodone and we discussed tapering strategy of decreasing by 1 tablet every week for 3 weeks until she has discontinued the medication.  We also discussed that it be reasonable to add melatonin while tapering off the medication and using as needed thereafter.      Prescription drug management  70 minutes spent on the date of the encounter doing chart review, history and exam, documentation and further activities per the note       BMI:   Estimated body mass index is 49.23 kg/m  as calculated from the following:    Height as of 4/20/22: 1.715 m (5' 7.5\").    Weight as of 4/20/22: 144.7 kg (319 lb).   Weight management plan: Discussed healthy diet and exercise guidelines        Return in about 3 months (around 8/31/2022) for Annual welless visit and routine labs.    CHANTAL Grace CNP  [facility]    Breana Floyd is a 77 year old who presents for the following health issues     HPI         Diabetes Follow-up    How often are you checking your blood sugar? Continuous glucose monitor  What time of day are you checking your blood sugars (select all that apply)?  Before and after meals  Have you had any blood sugars above 200?  No  Have you had any blood sugars below 70?  No    What symptoms do you notice when your blood sugar is low?  None    What concerns do you have today about your diabetes? None     Do you have any of these " symptoms? (Select all that apply)  No numbness or tingling in feet.  No redness, sores or blisters on feet.  No complaints of excessive thirst.  No reports of blurry vision.  No significant changes to weight.    Have you had a diabetic eye exam in the last 12 months? No                Hyperlipidemia Follow-Up      Are you regularly taking any medication or supplement to lower your cholesterol?   Yes- atorvastatin 40mg    Are you having muscle aches or other side effects that you think could be caused by your cholesterol lowering medication?  No    Hypertension Follow-up      Do you check your blood pressure regularly outside of the clinic? Yes     Are you following a low salt diet? Yes    Are your blood pressures ever more than 140 on the top number (systolic) OR more   than 90 on the bottom number (diastolic), for example 140/90? Yes-occasionally    BP Readings from Last 2 Encounters:   04/21/22 125/81   04/19/22 (!) 165/66     Hemoglobin A1C POCT (%)   Date Value   04/29/2021 6.8 (H)   06/24/2020 6.9 (H)     Hemoglobin A1C (%)   Date Value   03/09/2022 6.8 (H)   11/23/2021 7.0 (H)     LDL Cholesterol Calculated (mg/dL)   Date Value   03/09/2022 89   04/29/2021 117 (H)   03/13/2019 76       Pain History:  When did you first notice your pain? - Chronic Pain   Have you seen this provider for your pain in the past?   Yes   Where in your body do you have pain? Neck and back, coccyx, headaches  Are you seeing anyone else for your pain? Yes - pain clinic    PHQ-9 SCORE 2/27/2020 10/9/2020 11/23/2021   PHQ-9 Total Score - - -   PHQ-9 Total Score MyChart - - -   PHQ-9 Total Score 8 4 6       XU-7 SCORE 11/5/2019 2/27/2020 11/23/2021   Total Score - - -   Total Score 17 2 5         Following up today regarding her chronic pain issues.  Was recently seen in the pain clinic, did not respond well to steroid injections in the coccyx.  They have been trying a purple pillow at home which they have found to be mildly helpful.  At  "this time only taking Tylenol 2 times a day.  Uses tramadol on occasion.  Wondering about increasing Lyrica.    She has not scheduled follow-up with neurology to discuss her headaches as of yet.    They did present to an orthopedic urgent care after her last visit and found out her left toe was broken.  Treatment was conservative.    Had in-home PT which was somewhat beneficial.  Roommate remarks patient is able to move around a little better.      The roommate states patient sleeps between 11 and 12 hours at night.  Mariel however states she does get up 3 times a night due to being uncomfortable.  Typically needs assistance once at night to get out of bed to use the bathroom.  Has been taking trazodone 150 mg nightly.      Review of Systems   Constitutional, HEENT, cardiovascular, pulmonary, gi and gu systems are negative, except as otherwise noted.      Objective    Vitals - Patient Reported  Weight (Patient Reported): 144.7 kg (319 lb)  Height (Patient Reported): 170.2 cm (5' 7\")  BMI (Based on Pt Reported Ht/Wt): 49.96        Physical Exam   GENERAL: alert, no distress.  EYES: Eyes grossly normal to inspection.  No discharge or erythema, or obvious scleral/conjunctival abnormalities.  RESP: No audible wheeze, cough, or visible cyanosis.  No visible retractions or increased work of breathing.    SKIN: Visible skin clear. No significant rash, abnormal pigmentation or lesions.  NEURO: Cranial nerves grossly intact.  Mentation and speech appropriate for age.  PSYCH: Mentation appears normal, affect normal/bright, judgement and insight intact, normal speech and appearance well-groomed.                Video-Visit Details    Type of service:  Video Visit    Video End Time:1829    Originating Location (pt. Location): Home    Distant Location (provider location):  [facility]     Platform used for Video Visit: Guy  "

## 2022-06-01 ENCOUNTER — CARE COORDINATION (OUTPATIENT)
Dept: CARDIOLOGY | Facility: CLINIC | Age: 77
End: 2022-06-01
Payer: MEDICARE

## 2022-06-01 NOTE — PROGRESS NOTES
[facility]ORE Clinic - CardioMEMS Reading Review    June 1, 2022   CardioMEMS reviewed.  Current diuretic: Bumex 4 mg daily  Recent plan of care changes: Last week did a 2 day decrease of Bumex to 3 mg daily. Now back on 4 mg daily and back in range.     Current Threshold parameters:       Today's Waveform:       Readings:           RHC Date Wedge Pressure MEMS PAD during cath  (average of the 3 values)     7/1/2019 w/MEMS implant     20 mmHg   18 mmHg           Rosina Mays RN

## 2022-06-02 ENCOUNTER — ALLIED HEALTH/NURSE VISIT (OUTPATIENT)
Dept: CARDIOLOGY | Facility: CLINIC | Age: 77
End: 2022-06-02
Attending: NURSE PRACTITIONER
Payer: MEDICARE

## 2022-06-02 DIAGNOSIS — I50.32 CHRONIC DIASTOLIC HEART FAILURE (H): Primary | ICD-10-CM

## 2022-06-02 PROCEDURE — 93264 REM MNTR WRLS P-ART PRS SNR: CPT | Performed by: NURSE PRACTITIONER

## 2022-06-02 NOTE — PLAN OF CARE
Problem: Patient Care Overview  Goal: Plan of Care/Patient Progress Review  Patient at procedure earlier this PM. Will reschedule PT evaluation to tomorrow and initiate as appropriate.       normal...

## 2022-06-03 NOTE — PROGRESS NOTES
Remote Monitoring completed for billing cycle of 5/4/22 through 6/2/22. PAD overall stable. See care coordination notes from following encounters:    5/11/22 - no changes   5/18/22 - no changes  5/25/22 - PAD was slightly low, decreased Bumex to 3 mg daily for 2 days.   6/1/22 - no changes    Rosina Mays RN

## 2022-06-05 ENCOUNTER — HEALTH MAINTENANCE LETTER (OUTPATIENT)
Age: 77
End: 2022-06-05

## 2022-06-08 NOTE — PROGRESS NOTES
Remote monitoring of Pulmonary Artery Pressures via CardioMEMS device reviewed at least weekly and as needed with CORE nursing. Diuretics adjusted accordingly.      Austin CORNEJO NP-C

## 2022-06-10 ENCOUNTER — CARE COORDINATION (OUTPATIENT)
Dept: CARDIOLOGY | Facility: CLINIC | Age: 77
End: 2022-06-10
Payer: MEDICARE

## 2022-06-10 NOTE — PROGRESS NOTES
[facility] CORE Clinic - CardioMEMS Reading Review    Ana Rosa 10, 2022   CardioMEMS reviewed.  Current diuretic: Bumex 4 mg daily  Recent plan of care changes: none. PAD in goal range.     Current Threshold parameters:       Today's Waveform:       Readings:           RHC Date Wedge Pressure MEMS PAD during cath  (average of the 3 values)     7/1/2019 w/MEMS implant     20 mmHg   18 mmHg           Rosina Mays RN

## 2022-06-15 ENCOUNTER — CARE COORDINATION (OUTPATIENT)
Dept: CARDIOLOGY | Facility: CLINIC | Age: 77
End: 2022-06-15
Payer: MEDICARE

## 2022-06-15 ENCOUNTER — MEDICAL CORRESPONDENCE (OUTPATIENT)
Dept: HEALTH INFORMATION MANAGEMENT | Facility: CLINIC | Age: 77
End: 2022-06-15
Payer: MEDICARE

## 2022-06-15 NOTE — PROGRESS NOTES
[facility] CORE Clinic - CardioMEMS Reading Review    Ana Rosa 15, 2022   CardioMEMS reviewed.  Current diuretic: Bumex 4 mg daily  Recent plan of care changes: none. PAD in goal range.     Current Threshold parameters:       Today's Waveform:       Readings:           RHC Date Wedge Pressure MEMS PAD during cath  (average of the 3 values)     7/1/2019 w/MEMS implant     20 mmHg   18 mmHg           Rosina Mays RN

## 2022-06-18 ENCOUNTER — MYC MEDICAL ADVICE (OUTPATIENT)
Dept: FAMILY MEDICINE | Facility: CLINIC | Age: 77
End: 2022-06-18
Payer: MEDICARE

## 2022-06-18 DIAGNOSIS — E11.65 TYPE 2 DIABETES MELLITUS WITH HYPERGLYCEMIA, WITH LONG-TERM CURRENT USE OF INSULIN (H): ICD-10-CM

## 2022-06-18 DIAGNOSIS — Z79.4 TYPE 2 DIABETES MELLITUS WITH HYPERGLYCEMIA, WITH LONG-TERM CURRENT USE OF INSULIN (H): ICD-10-CM

## 2022-06-19 NOTE — PROGRESS NOTES
[facility] now requests orders and shares plan of care/discharge summaries for some patients through [employer].  Please REPLY TO THIS MESSAGE OR ROUTE BACK TO THE AUTHOR in order to give authorization for orders when needed.  This is considered a verbal order, you will still receive a faxed copy of orders for signature.  Thank you for your assistance in improving collaboration for our patients.    ORDER    OT 2w1, 1w2 for HEP, activity tolerance, EC/WS, ADL/IADL safety.  The plan will also include falls prevention plan, monitor and treat pain, monitor skin integrity, continence management, monitor for s/s of depression, diabetic foot care, monitor for symptoms of heart failure and to achieve the following goals.  In 3 weeks,   1. Pt will demo indep with B UE HEP following written handout for inc activity tolerance for inc indep and safety with bathing and cooking.  2. pt will demo understanding re use of adaptive techniques and AE for inc indep and safety with dressing and bathing.  3. pt will demo mod indep and good safety with simple meal prep for inc indep and safety with cooking.  4. pt will demo understanding re energy conservation and work simplification for inc indep and safety with dressing, bathing and cooking.  5. pt will demo understanding and competence in anxiety mgmt techniques for inc indep and safety with cooking and bathing.    Elza Willoughby, OTR/L  [phone]     Dylan Sevilla,       Sinusitis/Sinus Infection is a very common problem that is also caused by a virus. Mucous and swelling can block the spaces above and below the eyes. This blockage contributes to the sinus inflammation and tenderness. Very often sinus infections clear up on their own. The typical virus will last 7-10 days. Antibiotics will not help a viral infection but there are several OTC medications that will help with symptoms. If symptoms worsen an in-person visit is needed. If symptoms are no better after 10 days return to [facility] for a follow up visit.       Contact your employee health or human resources department for further guidance on employer specific quarantine and return to work guidelines. If your school age child has symptoms of COVID or has had a COVID exposure, contact your child's school as they may have additional instructions and requirements to return to school.  _________________________________________________________________________________________________________________________________________    Molnupiravir is another oral treatment available providing reduction of hospitalization and death by 30%.    Notify your healthcare immediately if,  you think you may be pregnant. This medication is not recommended for those that are pregnant or breastfeeding.     Instructions for use: Dosing is 800 mg (four 200 mg capsules) twice daily x 5 days.  Missed dose: If a dose is missed within 10 hours of usual administration time, administer the missed dose as soon as possible, and resume normal dosing schedule. If a dose is missed by more than 10 hours, do not administer the missed dose, and resume dosing at the next scheduled administration time. Do not double the dose to make up for a missed dose.   Females need to use contraception during and + 4 days after treatment.   Males need to use condoms during and + 3 months after treatment.      Side effects: Diarrhea, nausea and  dizziness.    You will find the full Emergency use authorization for each of these medications in this document.        COVID-19 Symptoms:   People with COVID-19 have had a wide range of symptoms reported - from mild symptoms to severe illness. Symptoms may appear 2-14 days after exposure to the virus. Symptoms may include any of the following:  (Any person with symptoms of COVID-19 should be tested regardless of vaccination status).          Fever or chills       Cough       Shortness of breath or difficulty breathing       Fatigue       Muscle or body aches       Headache       New loss of taste or smell       Sore throat       Congestion or runny nose       Nausea or vomiting       Diarrhea      POSITIVE TESTS RESULTS:    You cannot return to normal public activities (work, school, etc.) until ALL CDC return to work criteria are met:    · 5 days since symptoms first appeared and    · After Day 5: You can leave your house wearing a mask around others if you have no symptoms or symptoms are resolving with no fever.    · Continue to social distance and wash your hands.    *Loss of taste and smell may persist for weeks or months after recovery and need not delay the end of isolation    __________________________________________________________________________________________________________________________________________     Symptoms Management:     Instructions for Adults:     Take Mucinex (guaifenesin) 600 mg twice daily, this is an expectorant which means it thins the mucus/phlegm so one can blow, cough or spit mucus/phlegm out better.    Take Flonase nasal spray 2 sprays in each nostril daily for 2 weeks for nasal congestion.     If needed you may take Sudafed as instructed on the package, if sinus pressure is present (do not take if you have history of high blood pressure/hypertension, are pregnant or breastfeeding).      If needed you may take Tylenol every 4 hours as needed for fever or aches (Do not exceed  4,000mg in 24 hours).    Drink Warm tea with honey.    Salt water gargles and throat lozenges for sore throat.      Instructions for Everyone:     Drink lots of fluids.     Use a cool mist Humidifier.     Use a warm moist compress to sinuses 4-6 times daily to help facilitate sinus drainage.      Sterile saline rinses 3 times daily (nasal saline spray)    Cover coughs or sneezes.      Practice good hand hygiene.        If symptoms worsen an in-person visit is needed. If symptoms are no better after 10 days return to [facility] for a follow up visit.           Medication refills: If you were prescribed a medication today please be aware that the [facility] providers are not able to provide refills on these medications. If you require a refill, please contact your primary care provider. If you do not have a primary care provider please call [phone] and we can schedule a visit with an [facility] primary care provider.      Billing department:    Call with any billing questions or concerns.    Phone: [phone]. Hours: Mon. - Thu. 7:30 a.m. - 6 p.m. and Friday 7:30 p.m. - 5 p.m.        Thank you for connecting with us today on the [employer] service of [facility]. If you have any questions after your visit, please feel free to contact us at [phone]. If you are experiencing a medical emergency, call 911 immediately.  If your symptoms worsen or do not improve, please contact your primary care provider to schedule an in-person visit. Symptoms that require immediate attention require a visit at Urgent Care ([place]), Immediate Care Center ([place]) or the Emergency Room of a nearby hospital.         Emergency Use Authorization (EUA) Of LAGEVRIO™ (molnupiravir) capsules For Coronavirus Disease 2019 (COVID-19)  What is the most important information I should know about LAGEVRIO?    LAGEVRIO may cause serious side effects, including:    LAGEVRIO may cause  harm to your unborn baby. It is not known if LAGEVRIO will harm your baby if you take LAGEVRIO during pregnancy.  o LAGEVRIO is not recommended for use in pregnancy.   o LAGEVRIO has not been studied in pregnancy. LAGEVRIO was studied in pregnant animals only. When LAGEVRIO was given to pregnant animals, LAGEVRIO caused  harm to their unborn babies.  o You and your healthcare provider may decide that you should take LAGEVRIO during pregnancy if there are no other COVID-19 treatment options approved or authorized by  the FDA that are accessible or clinically appropriate for you.  o If you and your healthcare provider decide that you should take LAGEVRIO during pregnancy, you and your healthcare provider should discuss the known and potential  benefits and the potential risks of taking LAGEVRIO during pregnancy.    For individuals who are able to become pregnant:  -You should use a reliable method of birth control (contraception) consistently and correctly during treatment with LAGEVRIO and for 4 days after the last dose of LAGEVRIO. Talk to your healthcare provider about reliable birth control methods.  -Before starting treatment with LAGEVRIO your healthcare provider may do a pregnancy test to see if you are pregnant before starting treatment with LAGEVRIO.  -Tell your healthcare provider right away if you become pregnant or think you may be pregnant during treatment with LAGEVRIO.    Pregnancy Surveillance Program:  There is a pregnancy surveillance program for individuals who take LAGEVRIO during pregnancy. The purpose of this program is to collect information about the health of you and your baby. Talk to your healthcare provider about how to take part in this program. If you take LAGEVRIO during pregnancy and you agree to participate in the pregnancy  surveillance program and allow your healthcare provider to share your information with [employer], then your healthcare provider will report your use of  LAGEVRIO  during pregnancy to [employer]. by calling [phone] or [email or web address].    For individuals who are sexually active with partners who are able to become pregnant:  It is not known if LAGEVRIO can affect sperm. While the risk is regarded as low, animal studies to fully assess the potential for LAGEVRIO to affect the babies of males treated with LAGEVRIO have not been completed. A reliable method of birth control (contraception) should be used consistently and correctly during treatment with LAGEVRIO and for at least 3 months after the last dose. The risk to sperm beyond 3 months is not known. Studies to understand the risk to sperm beyond 3 months are ongoing. Talk to your healthcare provider about reliable birth control methods. Talk to your healthcare provider if you have questions or concerns about how LAGEVRIO may affect sperm.    You are being given this fact sheet because your healthcare provider believes it is necessary to provide you with LAGEVRIO for the treatment of adults with mild-to-moderate coronavirus disease 2019 (COVID-19) with positive results of direct SARS-CoV-2 viral testing, and who are at high risk for progression to severe COVID-19 including hospitalization or death, and for whom other COVID-19 treatment options approved or authorized by the FDA are not accessible or clinically appropriate.     The U.S. Food and Drug Administration (FDA) has issued an Emergency Use Authorization  (EUA) to make LAGEVRIO available during the COVID-19 pandemic (for more details about anEUA please see “What is an Emergency Use Authorization?” at the end of this document). LAGEVRIO is not an FDA-approved medicine in the [place]. Read this Fact Sheet for information about LAGEVRIO. Talk to your healthcare provider about your options if you have any questions. It is your choice to take LAGEVRIO.    What is COVID-19?  COVID-19 is caused by a virus called a coronavirus.  You can get COVID-19 through close contact with another person who has the virus. COVID-19 illnesses have ranged from very mild-to-severe, including illness resulting in death. While information so far suggests that most COVID-19 illness is mild, serious illness can happen and may cause some of your other medical conditions to become worse. Older people and people of all ages with severe, long lasting (chronic) medical conditions like heart disease,lung disease and diabetes, for example seem to be at higher risk of being hospitalized for COVID-19.    What is LAGEVRIO?  LAGEVRIO is an investigational medicine used to treat mild-to-moderate COVID-19 in adults:  with positive results of direct SARS-CoV-2 viral testing, and who are at high risk for progression to severe COVID-19 including hospitalization or death, and for whom other COVID-19 treatment options approved or authorized by the FDA are not accessible or clinically appropriate.    The FDA has authorized the emergency use of LAGEVRIO for the treatment of mild-tomoderate COVID-19 in adults under an EUA. For more information on EUA, see the   “What is an Emergency Use Authorization (EUA)?” section at the end of this Fact Sheet.     LAGEVRIO is not authorized:  ? for use in people less than 18 years of age.   ? for prevention of COVID-19.  ? for people needing hospitalization for COVID-19.  ? for use for longer than 5 consecutive days.    What should I tell my healthcare provider before I take LAGEVRIO?  Tell your healthcare provider if you:  ? Have any allergies  ? Are breastfeeding or plan to breastfeed  ? Have any serious illnesses  ? Are taking any medicines (prescription, over-the-counter, vitamins, or herbal products).     How do I take LAGEVRIO?  ? Take LAGEVRIO exactly as your healthcare provider tells you to take it.  ? Take 4 capsules of LAGEVRIO every 12 hours (for example, at 8 am and at 8 pm)       Take LAGEVRIO for 5 days. It is important that you  complete the full 5 days of        treatment with LAGEVRIO. Do not stop taking LAGEVRIO before you complete the full 5       days of treatment, even if you feel better.    Take LAGEVRIO with or without food.    You should stay in isolation for as long as your healthcare provider tells you to. Talk to your healthcare provider if you are not sure about how to properly isolate while you have  COVID-19. Swallow LAGEVRIO capsules whole. Do not open, break, or crush the capsules. If you cannot swallow capsules whole, tell your healthcare provider.  ?     What to do if you miss a dose:  o If it has been less than 10 hours since the missed dose, take it as soon as you remember  o If it has been more than 10 hours since the missed dose, skip the missed dose and take your dose at the next scheduled time. Do not double the dose of LAGEVRIO to make up for a missed dose.    What are the important possible side effects of LAGEVRIO?  See, “What is the most important information I should know about LAGEVRIO?”  Allergic Reactions. Allergic reactions can happen in people taking LAGEVRIO, even after only 1 dose. Stop taking LAGEVRIO and call your healthcare provider right away if    You get any of the following symptoms of an allergic reaction:  o hives  o rapid heartbeat  o trouble swallowing or breathing  o swelling of the mouth, lips, or face  o throat tightness  o hoarseness  o skin rash    The most common side effects of LAGEVRIO are:  ? diarrhea  ? nausea  ? dizziness    These are not all the possible side effects of LAGEVRIO. Not many people have taken LAGEVRIO. Serious and unexpected side effects may happen. This medicine is still being  studied, so it is possible that all of the risks are not known at this time.    What other treatment choices are there?  Veklury (remdesivir) is FDA-approved as an intravenous (IV) infusion for the treatment of mildto-moderate COVID-19 in certain adults and children. Talk with your doctor to  see if Veklury is appropriate for you. Like LAGEVRIO, FDA may also allow for the emergency use of other medicines to treat people with COVID-19.     Go to https://www.fda.gov/emergency-preparedness-and-response/mcm-legalregulatory-and-policy-framework/emergency-use-authorization for more information.  It is your choice to be treated or not to be treated with LAGEVRIO. Should you decide not to take it, it will not change your standard medical care.    What if I am breastfeeding?  Breastfeeding is not recommended during treatment with LAGEVRIO and for 4 days after the last dose of LAGEVRIO. If you are breastfeeding or plan to breastfeed, talk to your healthcare provider about your options and specific situation before taking LAGEVRIO.    How do I report side effects with LAGEVRIO?  Contact your healthcare provider if you have any side effects that bother you or do not go away.     Report side effects to FDA MedWatch at www.fda.gov/medwatch or call 3-102-WIF-5683 (1- [phone]).    How should I store LAGEVRIO?  Store LAGEVRIO capsules at room temperature between 68°F to 77°F (20°C to 25°C).   Keep LAGEVRIO and all medicines out of the reach of children and pets.    How can I learn more about COVID-19?  ? Ask your healthcare provider.  ? Visit www.cdc.gov/COVID19  ? Contact your local or state public health department.  ? Call [employer] at [phone] (toll free in the [place]  ? Visit [email or web address]    What Is an Emergency Use Authorization (EUA)?  The [place] FDA has made LAGEVRIO available under an emergency access mechanism called an Emergency Use Authorization (EUA) The EUA is supported by a  [place] of Health and Human Service (HHS) declaration that circumstances exist to justify emergency use of drugs and biological products during the COVID-19 pandemic.  LAGEVRIO for the treatment of mild-to-moderate COVID-19 in adults with positive results of direct SARS-CoV-2 viral testing, who are  at high risk for progression to severe COVID-19, including hospitalization or death, and for whom alternative COVID-19 treatment options approved or authorized by FDA are not accessible or clinically appropriate, has not undergone the same type of review as an FDA-approved product. In issuing an EUA under the COVID-19 public health emergency, the FDA has determined, among other things, that based on the total amount of scientific evidence available including data from adequate and well-controlled clinical trials, if available, it is reasonable to believe that the product may be effective for diagnosing, treating, or preventing COVID-19, or a serious or life-threatening disease or condition caused by COVID-19; that the known and potential benefits of the product, when used to diagnose, treat, or prevent such disease or condition, outweigh the known and potential risks of such product; and that there are no adequate, approved, and available alternatives.     All of these criteria must be met to allow for the product to be used in the treatment of patients during the COVID-19 pandemic. The EUA for LAGEVRIO is in effect for the duration of the COVID-19 declaration justifying emergency use of LAGEVRIO, unless terminated or revoked (after which LAGEVRIO may no longer be used under the EUA).      For patent information: www.[employer].com/research/patent  Copyright © 7134-8741 [employer] [place], [place] and its affiliates.  All rights reserved.  [id number]

## 2022-06-20 RX ORDER — INSULIN GLARGINE 100 [IU]/ML
INJECTION, SOLUTION SUBCUTANEOUS
Qty: 15 ML | Refills: 1 | Status: ON HOLD | OUTPATIENT
Start: 2022-06-20 | End: 2022-07-16

## 2022-06-22 ENCOUNTER — CARE COORDINATION (OUTPATIENT)
Dept: CARDIOLOGY | Facility: CLINIC | Age: 77
End: 2022-06-22

## 2022-06-22 ENCOUNTER — PATIENT OUTREACH (OUTPATIENT)
Dept: NURSING | Facility: CLINIC | Age: 77
End: 2022-06-22

## 2022-06-22 ASSESSMENT — ACTIVITIES OF DAILY LIVING (ADL): DEPENDENT_IADLS:: SHOPPING

## 2022-06-22 NOTE — PROGRESS NOTES
[facility] CORE Clinic - CardioMEMS Reading Review    June 22, 2022   CardioMEMS reviewed.  Current diuretic: Bumex 4 mg daily  Recent plan of care changes: none. PAD in goal range.     Current Threshold parameters:       Today's Waveform:       Readings:           RHC Date Wedge Pressure MEMS PAD during cath  (average of the 3 values)     7/1/2019 w/MEMS implant     20 mmHg   18 mmHg           Rosina Mays RN

## 2022-06-22 NOTE — PROGRESS NOTES
Clinic Care Coordination Contact    Writer reached out to patient's roommate Odalis to check on patient. Odalis shares she was at a conference and asks writer to call back in 1 week.   Writer will call in 1 week to discuss new goals or possible graduation.     Peggy Raines, RN Care Coordinator     [facility] Management  [facility] and   [email or web address]    Office: [phone]

## 2022-06-27 ENCOUNTER — MYC MEDICAL ADVICE (OUTPATIENT)
Dept: CARDIOLOGY | Facility: CLINIC | Age: 77
End: 2022-06-27

## 2022-06-27 DIAGNOSIS — I50.32 CHRONIC DIASTOLIC HEART FAILURE (H): Chronic | ICD-10-CM

## 2022-06-27 RX ORDER — BUMETANIDE 1 MG/1
TABLET ORAL
Qty: 360 TABLET | Refills: 3 | Status: SHIPPED | OUTPATIENT
Start: 2022-06-27 | End: 2023-02-10

## 2022-06-27 RX ORDER — BUMETANIDE 1 MG/1
TABLET ORAL
Qty: 360 TABLET | Refills: 1 | OUTPATIENT
Start: 2022-06-27

## 2022-06-30 ENCOUNTER — PATIENT OUTREACH (OUTPATIENT)
Dept: CARE COORDINATION | Facility: CLINIC | Age: 77
End: 2022-06-30

## 2022-06-30 ASSESSMENT — ACTIVITIES OF DAILY LIVING (ADL): DEPENDENT_IADLS:: SHOPPING

## 2022-06-30 NOTE — PROGRESS NOTES
Clinic Care Coordination Contact    Follow Up Progress Note      Assessment:     Patient reported she is doing OK. Today she is dealing with a headache so did not talk with RN CC for very long.     Patient reports no new issues at this time.     Patient needs to set a goal to continue enrollment in care coordination. Goals were not set today as patient had a headache and conversation was very brief. Patient would like to discuss goals at next outreach.     Care Gaps:    Health Maintenance Due   Topic Date Due     MEDICARE ANNUAL WELLNESS VISIT  08/10/2017     DIABETIC FOOT EXAM  03/19/2019     HF ACTION PLAN  11/30/2021     MICROALBUMIN  04/20/2022     PHQ-9  05/23/2022     EYE EXAM  06/10/2022     FALL RISK ASSESSMENT  07/12/2022     HEMOGLOBIN  09/09/2022       Goals addressed this encounter:    Goals Addressed    None         Intervention/Education provided during outreach:     Education provided on CC and the need to establish a goal.    Education provided when to call Primary Care Provider's office for ongoing headache.      Outreach Frequency: monthly    Plan:   Patient/caregiver will call RNCC with questions, concerns, support needs. RNCC will be available as needed. Patient wrote down CC phone number and will reach out.     Care Coordinator will follow up in 1 month.     Catina Chavez RN Care Coordination   [facility]  Email: [email or web address]  Phone: [phone]

## 2022-07-01 ENCOUNTER — PATIENT OUTREACH (OUTPATIENT)
Dept: CARDIOLOGY | Facility: CLINIC | Age: 77
End: 2022-07-01

## 2022-07-01 ENCOUNTER — OFFICE VISIT (OUTPATIENT)
Dept: ORTHOPEDICS | Facility: CLINIC | Age: 77
End: 2022-07-01
Payer: MEDICARE

## 2022-07-01 ENCOUNTER — PRE VISIT (OUTPATIENT)
Dept: ORTHOPEDICS | Facility: CLINIC | Age: 77
End: 2022-07-01

## 2022-07-01 DIAGNOSIS — S92.512A CLOSED DISPLACED FRACTURE OF PROXIMAL PHALANX OF LESSER TOE OF LEFT FOOT, INITIAL ENCOUNTER: ICD-10-CM

## 2022-07-01 DIAGNOSIS — L60.2 LONG TOENAIL: ICD-10-CM

## 2022-07-01 DIAGNOSIS — M54.17 LUMBOSACRAL RADICULOPATHY: ICD-10-CM

## 2022-07-01 DIAGNOSIS — E11.49 TYPE II OR UNSPECIFIED TYPE DIABETES MELLITUS WITH NEUROLOGICAL MANIFESTATIONS, NOT STATED AS UNCONTROLLED(250.60) (H): Primary | ICD-10-CM

## 2022-07-01 DIAGNOSIS — B35.1 OM (ONYCHOMYCOSIS): ICD-10-CM

## 2022-07-01 PROCEDURE — 99204 OFFICE O/P NEW MOD 45 MIN: CPT | Performed by: PODIATRIST

## 2022-07-01 NOTE — TELEPHONE ENCOUNTER
[facility] CORE Clinic - CardioMEMS Reading Review    July 1, 2022   CardioMEMS reviewed.  Current diuretic: Bumex 4 mg daily  Recent plan of care changes: none. PAD in goal range.     Current Threshold parameters:       Today's Waveform:       Readings:           RHC Date Wedge Pressure MEMS PAD during cath  (average of the 3 values)     7/1/2019 w/MEMS implant     20 mmHg   18 mmHg           Rosina Mays RN
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
## 2022-07-02 ENCOUNTER — ALLIED HEALTH/NURSE VISIT (OUTPATIENT)
Dept: CARDIOLOGY | Facility: CLINIC | Age: 77
End: 2022-07-02
Attending: NURSE PRACTITIONER
Payer: MEDICARE

## 2022-07-02 DIAGNOSIS — I50.32 CHRONIC DIASTOLIC HEART FAILURE (H): Primary | ICD-10-CM

## 2022-07-02 PROCEDURE — 93264 REM MNTR WRLS P-ART PRS SNR: CPT | Performed by: NURSE PRACTITIONER

## 2022-07-08 ENCOUNTER — CARE COORDINATION (OUTPATIENT)
Dept: CARDIOLOGY | Facility: CLINIC | Age: 77
End: 2022-07-08

## 2022-07-08 NOTE — PROGRESS NOTES
[facility] - CardioMEMS Reading Review    July 8, 2022   CardioMEMS reviewed.  Current diuretic: Bumex 4 mg daily  Recent plan of care changes:     Current Threshold parameters:       Today's Waveform:       Readings:           RHC Date Wedge Pressure MEMS PAD during cath  (average of the 3 values)     7/1/2019 w/MEMS implant     20 mmHg   18 mmHg           Debbi Alcantara RN

## 2022-07-09 DIAGNOSIS — R35.0 URINARY FREQUENCY: ICD-10-CM

## 2022-07-10 ENCOUNTER — MYC MEDICAL ADVICE (OUTPATIENT)
Dept: UROLOGY | Facility: CLINIC | Age: 77
End: 2022-07-10

## 2022-07-11 RX ORDER — TOLTERODINE 2 MG/1
CAPSULE, EXTENDED RELEASE ORAL
Qty: 60 CAPSULE | Refills: 3 | Status: ON HOLD | OUTPATIENT
Start: 2022-07-11 | End: 2022-07-16

## 2022-07-12 ENCOUNTER — APPOINTMENT (OUTPATIENT)
Dept: GENERAL RADIOLOGY | Facility: CLINIC | Age: 77
End: 2022-07-12
Attending: NURSE PRACTITIONER
Payer: MEDICARE

## 2022-07-12 ENCOUNTER — HOSPITAL ENCOUNTER (OUTPATIENT)
Facility: CLINIC | Age: 77
Setting detail: OBSERVATION
Discharge: NURSING, REHAB OR LONG-TERM CARE | End: 2022-07-16
Attending: EMERGENCY MEDICINE | Admitting: NURSE PRACTITIONER
Payer: MEDICARE

## 2022-07-12 ENCOUNTER — APPOINTMENT (OUTPATIENT)
Dept: MRI IMAGING | Facility: CLINIC | Age: 77
End: 2022-07-12
Attending: EMERGENCY MEDICINE
Payer: MEDICARE

## 2022-07-12 ENCOUNTER — APPOINTMENT (OUTPATIENT)
Dept: CT IMAGING | Facility: CLINIC | Age: 77
End: 2022-07-12
Attending: EMERGENCY MEDICINE
Payer: MEDICARE

## 2022-07-12 DIAGNOSIS — R29.6 RECURRENT FALLS: ICD-10-CM

## 2022-07-12 DIAGNOSIS — Z79.4 TYPE 2 DIABETES MELLITUS WITH STAGE 3 CHRONIC KIDNEY DISEASE, WITH LONG-TERM CURRENT USE OF INSULIN, UNSPECIFIED WHETHER STAGE 3A OR 3B CKD (H): Chronic | ICD-10-CM

## 2022-07-12 DIAGNOSIS — N18.30 TYPE 2 DIABETES MELLITUS WITH STAGE 3 CHRONIC KIDNEY DISEASE, WITH LONG-TERM CURRENT USE OF INSULIN, UNSPECIFIED WHETHER STAGE 3A OR 3B CKD (H): Chronic | ICD-10-CM

## 2022-07-12 DIAGNOSIS — I10 HYPERTENSION, UNSPECIFIED TYPE: ICD-10-CM

## 2022-07-12 DIAGNOSIS — I12.0 HYPERTENSIVE CHRONIC KIDNEY DISEASE WITH STAGE 5 CHRONIC KIDNEY DISEASE OR END STAGE RENAL DISEASE (H): ICD-10-CM

## 2022-07-12 DIAGNOSIS — G47.30 SLEEP APNEA, UNSPECIFIED TYPE: ICD-10-CM

## 2022-07-12 DIAGNOSIS — Z11.52 ENCOUNTER FOR SCREENING LABORATORY TESTING FOR SEVERE ACUTE RESPIRATORY SYNDROME CORONAVIRUS 2 (SARS-COV-2): ICD-10-CM

## 2022-07-12 DIAGNOSIS — M53.3 PAIN IN THE COCCYX: ICD-10-CM

## 2022-07-12 DIAGNOSIS — I10 HYPERTENSION GOAL BP (BLOOD PRESSURE) < 130/80: Primary | Chronic | ICD-10-CM

## 2022-07-12 DIAGNOSIS — E11.22 TYPE 2 DIABETES MELLITUS WITH STAGE 3 CHRONIC KIDNEY DISEASE, WITH LONG-TERM CURRENT USE OF INSULIN, UNSPECIFIED WHETHER STAGE 3A OR 3B CKD (H): Chronic | ICD-10-CM

## 2022-07-12 DIAGNOSIS — R53.1 GENERALIZED WEAKNESS: ICD-10-CM

## 2022-07-12 LAB
ALBUMIN SERPL BCG-MCNC: 4 G/DL (ref 3.5–5.2)
ALBUMIN UR-MCNC: 50 MG/DL
ALP SERPL-CCNC: 95 U/L (ref 35–104)
ALT SERPL W P-5'-P-CCNC: 17 U/L (ref 10–35)
AMMONIA PLAS-SCNC: 22 UMOL/L (ref 11–51)
ANION GAP SERPL CALCULATED.3IONS-SCNC: 13 MMOL/L (ref 7–15)
APPEARANCE UR: CLEAR
AST SERPL W P-5'-P-CCNC: 20 U/L (ref 10–35)
BASOPHILS # BLD AUTO: 0 10E3/UL (ref 0–0.2)
BASOPHILS NFR BLD AUTO: 1 %
BILIRUB SERPL-MCNC: 0.4 MG/DL
BILIRUB UR QL STRIP: NEGATIVE
BUN SERPL-MCNC: 31.1 MG/DL (ref 8–23)
CALCIUM SERPL-MCNC: 10.3 MG/DL (ref 8.8–10.2)
CHLORIDE SERPL-SCNC: 107 MMOL/L (ref 98–107)
COLOR UR AUTO: ABNORMAL
CREAT SERPL-MCNC: 1.25 MG/DL (ref 0.51–0.95)
DEPRECATED HCO3 PLAS-SCNC: 25 MMOL/L (ref 22–29)
EOSINOPHIL # BLD AUTO: 0.1 10E3/UL (ref 0–0.7)
EOSINOPHIL NFR BLD AUTO: 1 %
ERYTHROCYTE [DISTWIDTH] IN BLOOD BY AUTOMATED COUNT: 13.4 % (ref 10–15)
GFR SERPL CREATININE-BSD FRML MDRD: 44 ML/MIN/1.73M2
GLUCOSE BLDC GLUCOMTR-MCNC: 128 MG/DL (ref 70–99)
GLUCOSE SERPL-MCNC: 157 MG/DL (ref 70–99)
GLUCOSE UR STRIP-MCNC: NEGATIVE MG/DL
HCT VFR BLD AUTO: 41.9 % (ref 35–47)
HGB BLD-MCNC: 13.5 G/DL (ref 11.7–15.7)
HGB UR QL STRIP: NEGATIVE
HYALINE CASTS: 3 /LPF
IMM GRANULOCYTES # BLD: 0 10E3/UL
IMM GRANULOCYTES NFR BLD: 1 %
KETONES UR STRIP-MCNC: NEGATIVE MG/DL
LACTATE SERPL-SCNC: 0.9 MMOL/L (ref 0.7–2)
LEUKOCYTE ESTERASE UR QL STRIP: NEGATIVE
LIPASE SERPL-CCNC: 70 U/L (ref 13–60)
LYMPHOCYTES # BLD AUTO: 1.5 10E3/UL (ref 0.8–5.3)
LYMPHOCYTES NFR BLD AUTO: 19 %
MCH RBC QN AUTO: 29.1 PG (ref 26.5–33)
MCHC RBC AUTO-ENTMCNC: 32.2 G/DL (ref 31.5–36.5)
MCV RBC AUTO: 90 FL (ref 78–100)
MONOCYTES # BLD AUTO: 0.6 10E3/UL (ref 0–1.3)
MONOCYTES NFR BLD AUTO: 7 %
MUCOUS THREADS #/AREA URNS LPF: PRESENT /LPF
NEUTROPHILS # BLD AUTO: 5.5 10E3/UL (ref 1.6–8.3)
NEUTROPHILS NFR BLD AUTO: 71 %
NITRATE UR QL: NEGATIVE
NRBC # BLD AUTO: 0 10E3/UL
NRBC BLD AUTO-RTO: 0 /100
PH UR STRIP: 5.5 [PH] (ref 5–7)
PLATELET # BLD AUTO: 149 10E3/UL (ref 150–450)
POTASSIUM SERPL-SCNC: 3.7 MMOL/L (ref 3.4–5.3)
PROT SERPL-MCNC: 6.4 G/DL (ref 6.4–8.3)
RBC # BLD AUTO: 4.64 10E6/UL (ref 3.8–5.2)
RBC URINE: <1 /HPF
SARS-COV-2 RNA RESP QL NAA+PROBE: NEGATIVE
SODIUM SERPL-SCNC: 145 MMOL/L (ref 136–145)
SP GR UR STRIP: 1.01 (ref 1–1.03)
TROPONIN T SERPL HS-MCNC: 45 NG/L
TROPONIN T SERPL HS-MCNC: 52 NG/L
UROBILINOGEN UR STRIP-MCNC: NORMAL MG/DL
WBC # BLD AUTO: 7.7 10E3/UL (ref 4–11)
WBC URINE: 0 /HPF

## 2022-07-12 PROCEDURE — G1010 CDSM STANSON: HCPCS

## 2022-07-12 PROCEDURE — 82140 ASSAY OF AMMONIA: CPT | Performed by: EMERGENCY MEDICINE

## 2022-07-12 PROCEDURE — G1010 CDSM STANSON: HCPCS | Mod: GC | Performed by: RADIOLOGY

## 2022-07-12 PROCEDURE — 36415 COLL VENOUS BLD VENIPUNCTURE: CPT | Performed by: EMERGENCY MEDICINE

## 2022-07-12 PROCEDURE — 96360 HYDRATION IV INFUSION INIT: CPT

## 2022-07-12 PROCEDURE — 999N000127 HC STATISTIC PERIPHERAL IV START W US GUIDANCE

## 2022-07-12 PROCEDURE — 83690 ASSAY OF LIPASE: CPT | Performed by: EMERGENCY MEDICINE

## 2022-07-12 PROCEDURE — 250N000013 HC RX MED GY IP 250 OP 250 PS 637: Performed by: NURSE PRACTITIONER

## 2022-07-12 PROCEDURE — 70450 CT HEAD/BRAIN W/O DYE: CPT | Mod: 26 | Performed by: RADIOLOGY

## 2022-07-12 PROCEDURE — 85025 COMPLETE CBC W/AUTO DIFF WBC: CPT | Performed by: EMERGENCY MEDICINE

## 2022-07-12 PROCEDURE — G0378 HOSPITAL OBSERVATION PER HR: HCPCS

## 2022-07-12 PROCEDURE — 99285 EMERGENCY DEPT VISIT HI MDM: CPT | Performed by: EMERGENCY MEDICINE

## 2022-07-12 PROCEDURE — 84155 ASSAY OF PROTEIN SERUM: CPT | Performed by: EMERGENCY MEDICINE

## 2022-07-12 PROCEDURE — 96361 HYDRATE IV INFUSION ADD-ON: CPT

## 2022-07-12 PROCEDURE — 84484 ASSAY OF TROPONIN QUANT: CPT | Performed by: NURSE PRACTITIONER

## 2022-07-12 PROCEDURE — U0003 INFECTIOUS AGENT DETECTION BY NUCLEIC ACID (DNA OR RNA); SEVERE ACUTE RESPIRATORY SYNDROME CORONAVIRUS 2 (SARS-COV-2) (CORONAVIRUS DISEASE [COVID-19]), AMPLIFIED PROBE TECHNIQUE, MAKING USE OF HIGH THROUGHPUT TECHNOLOGIES AS DESCRIBED BY CMS-2020-01-R: HCPCS | Performed by: EMERGENCY MEDICINE

## 2022-07-12 PROCEDURE — 83036 HEMOGLOBIN GLYCOSYLATED A1C: CPT | Performed by: NURSE PRACTITIONER

## 2022-07-12 PROCEDURE — 84484 ASSAY OF TROPONIN QUANT: CPT | Performed by: EMERGENCY MEDICINE

## 2022-07-12 PROCEDURE — 99285 EMERGENCY DEPT VISIT HI MDM: CPT | Mod: CS,25

## 2022-07-12 PROCEDURE — 71046 X-RAY EXAM CHEST 2 VIEWS: CPT | Mod: 26 | Performed by: RADIOLOGY

## 2022-07-12 PROCEDURE — 81001 URINALYSIS AUTO W/SCOPE: CPT | Performed by: EMERGENCY MEDICINE

## 2022-07-12 PROCEDURE — 258N000003 HC RX IP 258 OP 636: Performed by: EMERGENCY MEDICINE

## 2022-07-12 PROCEDURE — 83605 ASSAY OF LACTIC ACID: CPT | Performed by: EMERGENCY MEDICINE

## 2022-07-12 PROCEDURE — 36415 COLL VENOUS BLD VENIPUNCTURE: CPT | Performed by: NURSE PRACTITIONER

## 2022-07-12 PROCEDURE — 71046 X-RAY EXAM CHEST 2 VIEWS: CPT

## 2022-07-12 PROCEDURE — 70551 MRI BRAIN STEM W/O DYE: CPT | Mod: 26 | Performed by: RADIOLOGY

## 2022-07-12 PROCEDURE — C9803 HOPD COVID-19 SPEC COLLECT: HCPCS

## 2022-07-12 RX ORDER — TRAMADOL HYDROCHLORIDE 50 MG/1
50 TABLET ORAL EVERY 6 HOURS PRN
Status: DISCONTINUED | OUTPATIENT
Start: 2022-07-12 | End: 2022-07-16

## 2022-07-12 RX ORDER — METOPROLOL SUCCINATE 25 MG/1
25 TABLET, EXTENDED RELEASE ORAL DAILY
Status: DISCONTINUED | OUTPATIENT
Start: 2022-07-12 | End: 2022-07-16 | Stop reason: HOSPADM

## 2022-07-12 RX ORDER — PREGABALIN 100 MG/1
100 CAPSULE ORAL 2 TIMES DAILY
Status: DISCONTINUED | OUTPATIENT
Start: 2022-07-12 | End: 2022-07-16 | Stop reason: HOSPADM

## 2022-07-12 RX ORDER — LOSARTAN POTASSIUM 25 MG/1
25 TABLET ORAL DAILY
Status: DISCONTINUED | OUTPATIENT
Start: 2022-07-12 | End: 2022-07-16 | Stop reason: HOSPADM

## 2022-07-12 RX ORDER — SODIUM CHLORIDE 9 MG/ML
INJECTION, SOLUTION INTRAVENOUS CONTINUOUS
Status: DISCONTINUED | OUTPATIENT
Start: 2022-07-12 | End: 2022-07-15

## 2022-07-12 RX ORDER — ACETAMINOPHEN 325 MG/1
975 TABLET ORAL EVERY 8 HOURS
Status: DISCONTINUED | OUTPATIENT
Start: 2022-07-12 | End: 2022-07-16 | Stop reason: HOSPADM

## 2022-07-12 RX ORDER — TRAZODONE HYDROCHLORIDE 100 MG/1
100 TABLET ORAL
Status: DISCONTINUED | OUTPATIENT
Start: 2022-07-12 | End: 2022-07-12

## 2022-07-12 RX ADMIN — SODIUM CHLORIDE, PRESERVATIVE FREE: 5 INJECTION INTRAVENOUS at 13:21

## 2022-07-12 RX ADMIN — METOPROLOL SUCCINATE 25 MG: 25 TABLET, EXTENDED RELEASE ORAL at 22:27

## 2022-07-12 RX ADMIN — ACETAMINOPHEN 975 MG: 325 TABLET, FILM COATED ORAL at 22:27

## 2022-07-12 ASSESSMENT — ENCOUNTER SYMPTOMS
BACK PAIN: 1
WEAKNESS: 1
CONFUSION: 1
SPEECH DIFFICULTY: 1
HEADACHES: 1

## 2022-07-12 NOTE — ED PROVIDER NOTES
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                               -----------         ------                     Extra Green Top (Lithium...[[id number]]                      Final result               Extra Purple Top Tube[[id number]]                            Final result                 Please view results for these tests on the individual orders.   Extra Green Top (Lithium Heparin) Tube     Status: None   Result Value Ref Range    Hold Specimen JIC    Extra Purple Top Tube     Status: None   Result Value Ref Range    Hold Specimen JIC    Glucose by meter     Status: Abnormal   Result Value Ref Range    GLUCOSE BY METER POCT 161 (H) 70 - 99 mg/dL   Glucose by meter     Status: Abnormal   Result Value Ref Range    GLUCOSE BY METER POCT 120 (H) 70 - 99 mg/dL   Glucose by meter     Status: Abnormal   Result Value Ref Range    GLUCOSE BY METER POCT 197 (H) 70 - 99 mg/dL   Comprehensive metabolic panel     Status: Abnormal   Result Value Ref Range    Sodium 142 136 - 145 mmol/L    Potassium 3.7 3.4 - 5.3 mmol/L    Creatinine 1.17 (H) 0.51 - 0.95 mg/dL    Urea Nitrogen 27.2 (H) 8.0 - 23.0 mg/dL    Chloride 106 98 - 107 mmol/L    Carbon Dioxide (CO2) 24 22 - 29 mmol/L    Anion Gap  12 7 - 15 mmol/L    Glucose 156 (H) 70 - 99 mg/dL    Calcium 9.9 8.8 - 10.2 mg/dL    Protein Total 6.5 6.4 - 8.3 g/dL    Albumin 3.5 3.5 - 5.2 g/dL    Bilirubin Total 0.6 <=1.2 mg/dL    Alkaline Phosphatase 88 35 - 104 U/L    AST 26 10 - 35 U/L    ALT 11 10 - 35 U/L    GFR Estimate 48 (L) >60 mL/min/1.73m2   CBC with platelets     Status: Abnormal   Result Value Ref Range    WBC Count 9.4 4.0 - 11.0 10e3/uL    RBC Count 4.84 3.80 - 5.20 10e6/uL    Hemoglobin 14.3 11.7 - 15.7 g/dL    Hematocrit 43.3 35.0 - 47.0 %    MCV 90 78 - 100 fL    MCH 29.5 26.5 - 33.0 pg    MCHC 33.0 31.5 - 36.5 g/dL    RDW 13.2 10.0 - 15.0 %    Platelet Count 142 (L) 150 - 450 10e3/uL   EKG 12-lead, tracing only     Status: None   Result Value Ref Range    Systolic Blood Pressure  mmHg    Diastolic Blood Pressure  mmHg    Ventricular Rate 79 BPM    Atrial Rate 79 BPM    SD Interval 178 ms    QRS Duration 82 ms     ms    QTc 584 ms    P Axis 35 degrees    R AXIS -36 degrees    T Axis 126 degrees    Interpretation ECG       Sinus rhythm  Left axis deviation  ST & T wave abnormality, consider anterior ischemia  Abnormal ECG  When compared with ECG of 09-JUL-2021 16:35,  Premature ventricular complexes are no longer Present  Criteria for Septal infarct are no longer Present  T wave inversion now evident in Anterior leads  QT has lengthened  Confirmed by Rosi Pretty ([id number]) on 7/14/2022 8:35:19 AM     CBC with platelets differential     Status: Abnormal    Narrative    The following orders were created for panel order CBC with platelets differential.  Procedure                               Abnormality         Status                     ---------                               -----------         ------                     CBC with platelets and d...[[id number]]  Abnormal            Final result                 Please view results for these tests on the individual orders.     Medications   hydrALAZINE (APRESOLINE) injection 5 mg (5 mg  Intravenous Given 7/13/22 0915)   hydrALAZINE (APRESOLINE) injection 5 mg (5 mg Intravenous Given 7/13/22 1422)   hydrALAZINE (APRESOLINE) injection 5 mg (5 mg Intravenous Given 7/16/22 1146)        Assessments & Plan (with Medical Decision Making)     Mariel Ribeiro is a 77 year old female with a PMHx of coronary atherosclerosis, chronic diastolic heart failure s/p CABG x3, angina at rest, transient cerebral ischemia, bilateral carotid artery disease, s/p coronary angiogram, T2DM, HLD hypothyroidism, thyroid cancer s/p thyroidectomy, osteoarthritis, COPD, myalgia and myositis, mononeuritis, BELEN, morbid obesity, cervicalgia, fatty liver disease (nonalcoholic), fibromyalgia, lymphedema, neuropathy, spinal stenosis of lumbar region, vitamin D deficiency, hepatomegaly, hemoptysis, hypertensive CKD stage 5, elevated C-reactive protein, h/o left breast cancer, arthritis, gout, GERD, and gait instability who presents to the ED after a fall.   She had a CT of the head that showed no acute abnormality as well as a chest x-ray does not show evidence for pneumonia.  She in addition had an MRI of the brain that showed no infarct or other acute findings.  She was moderately hypertensive this is unlikely contributing to her presentation.  She will be admitted to ED observation for PT OT and blood pressure management.  I have reviewed the nursing notes. I have reviewed the findings, diagnosis, plan and need for follow up with the patient.    Discharge Medication List as of 7/16/2022 12:29 PM      START taking these medications    Details   hydrALAZINE (APRESOLINE) 10 MG tablet Take 1 tablet (10 mg) by mouth every 6 hours as needed (Hypertension: (Administer for DBP>90 or SBP>160)), Transitional             Final diagnoses:   Generalized weakness   Hypertension, unspecified type   I, Latonia Jennings, am serving as a trained medical scribe to document services personally performed by Andrea Swift MD, based on the  provider's statements to me.     I, Andrea Swift MD, was physically present and have reviewed and verified the accuracy of this note documented by Latonia Jennings.      --  Andrea Swift MD  [facility] EMERGENCY DEPARTMENT  7/12/2022     Andrea Swift MD  08/20/22 0652

## 2022-07-12 NOTE — PROGRESS NOTES
[employer] remote monitoring billing complete for period of 6/3-7/2. For additional details, please see care coordination details from following dates:    6/10/22  6/15/22  6/22/22  7/1/22    No changes made during this cycle. PAD has been in goal range.

## 2022-07-12 NOTE — ED TRIAGE NOTES
Pt BIBA for increased weakness/failure to thrive. Original EMS call for lift assist after sliding out of bed. Pt denies hitting head or loss of consciousness        Triage Assessment     Row Name 07/12/22 1030       Triage Assessment (Adult)    Airway WDL WDL       Respiratory WDL    Respiratory WDL WDL       Skin Circulation/Temperature WDL    Skin Circulation/Temperature WDL WDL       Cardiac WDL    Cardiac WDL WDL       Cognitive/Neuro/Behavioral WDL    Cognitive/Neuro/Behavioral WDL X    Level of Consciousness confused    Arousal Level opens eyes spontaneously    Orientation disoriented to;place;time

## 2022-07-12 NOTE — LETTER
[facility] [address] OBSERVATION [address] [address]  [phone]    FACSIMILE TRANSMITTAL SHEET    TO: [facility]   COMPANY: [facility]   FAX NUMBER: [phone]  PHONE NUMBER: [phone]     FROM: Lilia Doe MSW, LG  Phone: [phone]  Pager: [phone]  Fax: [phone]      DATE: 22      _____URGENT _____REVIEW ONLY _____PLEASE COMMENT____PLEASE REPLY    NOTES/COMMENTS: Updated discharge orders for [facility] ( 1945)                                  IF YOU DID NOT RECEIVE THE CORRECT NUMBER OF PAGES OR THE FAX DID NOT COME THROUGH CLEARLY, PLEASE CALL THE SENDER     CONFIDENTIALITY STATEMENT: Confidential information that may accompany this transmission contains protected health information under state and federal law and is legally privileged. This information is intended only for the use of the individual or entity named above and may be used only for carrying out treatment, payment or other healthcare operations. The recipient or person responsible for delivering this information is prohibited by law from disclosing this information without proper authorization to any other party, unless required to do so by law or regulation. If you are not the intended recipient, you are hereby notified that any review, dissemination, distribution, or copying of this message is strictly prohibited. If you have received this communication in error, please destroy the materials and contact us immediately by calling the number listed above. No response indicates that the information was received by the appropriate authorized party

## 2022-07-12 NOTE — LETTER
[facility] [address] OBSERVATION [address] [address]  [phone]    FACSIMILE TRANSMITTAL SHEET    TO: Meg   COMPANY: [facility]   FAX NUMBER: [phone]  PHONE NUMBER: [phone]     FROM: Lilia Doe MSW, LG  Phone: [phone]  Pager: [phone]  Fax: [phone]      DATE: 22      _____URGENT _____REVIEW ONLY _____PLEASE COMMENT____PLEASE REPLY    NOTES/COMMENTS: Re-uppdated discharge orders for [facility] ( 1945)                                  IF YOU DID NOT RECEIVE THE CORRECT NUMBER OF PAGES OR THE FAX DID NOT COME THROUGH CLEARLY, PLEASE CALL THE SENDER     CONFIDENTIALITY STATEMENT: Confidential information that may accompany this transmission contains protected health information under state and federal law and is legally privileged. This information is intended only for the use of the individual or entity named above and may be used only for carrying out treatment, payment or other healthcare operations. The recipient or person responsible for delivering this information is prohibited by law from disclosing this information without proper authorization to any other party, unless required to do so by law or regulation. If you are not the intended recipient, you are hereby notified that any review, dissemination, distribution, or copying of this message is strictly prohibited. If you have received this communication in error, please destroy the materials and contact us immediately by calling the number listed above. No response indicates that the information was received by the appropriate authorized party

## 2022-07-12 NOTE — ED NOTES
Bed: ED05  Expected date: 7/12/22  Expected time: 10:19 AM  Means of arrival: Ambulance  Comments:  EMS

## 2022-07-12 NOTE — LETTER
[facility] [address] OBSERVATION [address] [address]  [phone]    FACSIMILE TRANSMITTAL SHEET    TO: [facility]   COMPANY: [facility]   FAX NUMBER: [phone]  PHONE NUMBER: [phone]     FROM: NAKITA Gutierrez, LG  Phone: [phone]  Pager: [phone]  Fax: [phone]      DATE: 22      _____URGENT _____REVIEW ONLY _____PLEASE COMMENT____PLEASE REPLY    NOTES/COMMENTS: Discharge orders for [facility] ( 1945)                                      IF YOU DID NOT RECEIVE THE CORRECT NUMBER OF PAGES OR THE FAX DID NOT COME THROUGH CLEARLY, PLEASE CALL THE SENDER     CONFIDENTIALITY STATEMENT: Confidential information that may accompany this transmission contains protected health information under state and federal law and is legally privileged. This information is intended only for the use of the individual or entity named above and may be used only for carrying out treatment, payment or other healthcare operations. The recipient or person responsible for delivering this information is prohibited by law from disclosing this information without proper authorization to any other party, unless required to do so by law or regulation. If you are not the intended recipient, you are hereby notified that any review, dissemination, distribution, or copying of this message is strictly prohibited. If you have received this communication in error, please destroy the materials and contact us immediately by calling the number listed above. No response indicates that the information was received by the appropriate authorized party

## 2022-07-13 ENCOUNTER — PATIENT OUTREACH (OUTPATIENT)
Dept: CARE COORDINATION | Facility: CLINIC | Age: 77
End: 2022-07-13

## 2022-07-13 DIAGNOSIS — E11.22 TYPE 2 DIABETES MELLITUS WITH STAGE 3 CHRONIC KIDNEY DISEASE, WITH LONG-TERM CURRENT USE OF INSULIN (H): ICD-10-CM

## 2022-07-13 DIAGNOSIS — N18.30 TYPE 2 DIABETES MELLITUS WITH STAGE 3 CHRONIC KIDNEY DISEASE, WITH LONG-TERM CURRENT USE OF INSULIN (H): ICD-10-CM

## 2022-07-13 DIAGNOSIS — Z79.4 TYPE 2 DIABETES MELLITUS WITH STAGE 3 CHRONIC KIDNEY DISEASE, WITH LONG-TERM CURRENT USE OF INSULIN (H): ICD-10-CM

## 2022-07-13 LAB
ANION GAP SERPL CALCULATED.3IONS-SCNC: 16 MMOL/L (ref 7–15)
BUN SERPL-MCNC: 24.3 MG/DL (ref 8–23)
CALCIUM SERPL-MCNC: 10.2 MG/DL (ref 8.8–10.2)
CHLORIDE SERPL-SCNC: 107 MMOL/L (ref 98–107)
CREAT SERPL-MCNC: 0.98 MG/DL (ref 0.51–0.95)
DEPRECATED HCO3 PLAS-SCNC: 21 MMOL/L (ref 22–29)
ERYTHROCYTE [DISTWIDTH] IN BLOOD BY AUTOMATED COUNT: 13.2 % (ref 10–15)
GFR SERPL CREATININE-BSD FRML MDRD: 60 ML/MIN/1.73M2
GLUCOSE BLDC GLUCOMTR-MCNC: 176 MG/DL (ref 70–99)
GLUCOSE BLDC GLUCOMTR-MCNC: 179 MG/DL (ref 70–99)
GLUCOSE BLDC GLUCOMTR-MCNC: 196 MG/DL (ref 70–99)
GLUCOSE BLDC GLUCOMTR-MCNC: 199 MG/DL (ref 70–99)
GLUCOSE BLDC GLUCOMTR-MCNC: 204 MG/DL (ref 70–99)
GLUCOSE SERPL-MCNC: 185 MG/DL (ref 70–99)
HBA1C MFR BLD: 7.2 %
HCT VFR BLD AUTO: 46.3 % (ref 35–47)
HGB BLD-MCNC: 14.9 G/DL (ref 11.7–15.7)
MCH RBC QN AUTO: 29.3 PG (ref 26.5–33)
MCHC RBC AUTO-ENTMCNC: 32.2 G/DL (ref 31.5–36.5)
MCV RBC AUTO: 91 FL (ref 78–100)
PLATELET # BLD AUTO: 142 10E3/UL (ref 150–450)
POTASSIUM SERPL-SCNC: 3.4 MMOL/L (ref 3.4–5.3)
RBC # BLD AUTO: 5.09 10E6/UL (ref 3.8–5.2)
SODIUM SERPL-SCNC: 144 MMOL/L (ref 136–145)
TROPONIN T SERPL HS-MCNC: 104 NG/L
TROPONIN T SERPL HS-MCNC: 107 NG/L
TROPONIN T SERPL HS-MCNC: 99 NG/L
WBC # BLD AUTO: 9.4 10E3/UL (ref 4–11)

## 2022-07-13 PROCEDURE — 93005 ELECTROCARDIOGRAM TRACING: CPT

## 2022-07-13 PROCEDURE — 96372 THER/PROPH/DIAG INJ SC/IM: CPT

## 2022-07-13 PROCEDURE — 250N000012 HC RX MED GY IP 250 OP 636 PS 637: Performed by: NURSE PRACTITIONER

## 2022-07-13 PROCEDURE — 999N000147 HC STATISTIC PT IP EVAL DEFER

## 2022-07-13 PROCEDURE — 250N000013 HC RX MED GY IP 250 OP 250 PS 637: Performed by: NURSE PRACTITIONER

## 2022-07-13 PROCEDURE — 85027 COMPLETE CBC AUTOMATED: CPT | Performed by: NURSE PRACTITIONER

## 2022-07-13 PROCEDURE — 84484 ASSAY OF TROPONIN QUANT: CPT | Mod: 91 | Performed by: PHYSICIAN ASSISTANT

## 2022-07-13 PROCEDURE — 250N000011 HC RX IP 250 OP 636: Performed by: NURSE PRACTITIONER

## 2022-07-13 PROCEDURE — 80048 BASIC METABOLIC PNL TOTAL CA: CPT | Performed by: NURSE PRACTITIONER

## 2022-07-13 PROCEDURE — 82607 VITAMIN B-12: CPT

## 2022-07-13 PROCEDURE — 250N000013 HC RX MED GY IP 250 OP 250 PS 637: Performed by: PHYSICIAN ASSISTANT

## 2022-07-13 PROCEDURE — 82962 GLUCOSE BLOOD TEST: CPT | Mod: 91

## 2022-07-13 PROCEDURE — G0378 HOSPITAL OBSERVATION PER HR: HCPCS

## 2022-07-13 PROCEDURE — 258N000003 HC RX IP 258 OP 636: Performed by: EMERGENCY MEDICINE

## 2022-07-13 PROCEDURE — 93010 ELECTROCARDIOGRAM REPORT: CPT | Performed by: INTERNAL MEDICINE

## 2022-07-13 PROCEDURE — 99214 OFFICE O/P EST MOD 30 MIN: CPT | Mod: GC | Performed by: INTERNAL MEDICINE

## 2022-07-13 PROCEDURE — 999N000248 HC STATISTIC IV INSERT WITH US BY RN

## 2022-07-13 PROCEDURE — 96372 THER/PROPH/DIAG INJ SC/IM: CPT | Performed by: NURSE PRACTITIONER

## 2022-07-13 PROCEDURE — 36415 COLL VENOUS BLD VENIPUNCTURE: CPT | Performed by: NURSE PRACTITIONER

## 2022-07-13 PROCEDURE — 250N000011 HC RX IP 250 OP 636: Performed by: PHYSICIAN ASSISTANT

## 2022-07-13 PROCEDURE — 84484 ASSAY OF TROPONIN QUANT: CPT | Performed by: NURSE PRACTITIONER

## 2022-07-13 PROCEDURE — 96374 THER/PROPH/DIAG INJ IV PUSH: CPT

## 2022-07-13 PROCEDURE — 84443 ASSAY THYROID STIM HORMONE: CPT

## 2022-07-13 PROCEDURE — 36415 COLL VENOUS BLD VENIPUNCTURE: CPT | Performed by: PHYSICIAN ASSISTANT

## 2022-07-13 PROCEDURE — 96376 TX/PRO/DX INJ SAME DRUG ADON: CPT

## 2022-07-13 RX ORDER — ATORVASTATIN CALCIUM 40 MG/1
40 TABLET, FILM COATED ORAL DAILY
Status: DISCONTINUED | OUTPATIENT
Start: 2022-07-13 | End: 2022-07-16 | Stop reason: HOSPADM

## 2022-07-13 RX ORDER — AMOXICILLIN 250 MG
1 CAPSULE ORAL 2 TIMES DAILY
Status: DISCONTINUED | OUTPATIENT
Start: 2022-07-13 | End: 2022-07-16 | Stop reason: HOSPADM

## 2022-07-13 RX ORDER — REPAGLINIDE 1 MG/1
1 TABLET ORAL
Status: DISCONTINUED | OUTPATIENT
Start: 2022-07-13 | End: 2022-07-16 | Stop reason: HOSPADM

## 2022-07-13 RX ORDER — POTASSIUM CHLORIDE 750 MG/1
20 TABLET, EXTENDED RELEASE ORAL 2 TIMES DAILY
Status: DISCONTINUED | OUTPATIENT
Start: 2022-07-13 | End: 2022-07-16 | Stop reason: HOSPADM

## 2022-07-13 RX ORDER — FOLIC ACID 1 MG/1
2 TABLET ORAL DAILY
Status: DISCONTINUED | OUTPATIENT
Start: 2022-07-13 | End: 2022-07-16 | Stop reason: HOSPADM

## 2022-07-13 RX ORDER — NALOXONE HYDROCHLORIDE 0.4 MG/ML
0.2 INJECTION, SOLUTION INTRAMUSCULAR; INTRAVENOUS; SUBCUTANEOUS
Status: DISCONTINUED | OUTPATIENT
Start: 2022-07-13 | End: 2022-07-16 | Stop reason: HOSPADM

## 2022-07-13 RX ORDER — NALOXONE HYDROCHLORIDE 0.4 MG/ML
0.4 INJECTION, SOLUTION INTRAMUSCULAR; INTRAVENOUS; SUBCUTANEOUS
Status: DISCONTINUED | OUTPATIENT
Start: 2022-07-13 | End: 2022-07-16 | Stop reason: HOSPADM

## 2022-07-13 RX ORDER — IPRATROPIUM BROMIDE AND ALBUTEROL SULFATE 2.5; .5 MG/3ML; MG/3ML
3 SOLUTION RESPIRATORY (INHALATION) EVERY 4 HOURS PRN
Status: DISCONTINUED | OUTPATIENT
Start: 2022-07-13 | End: 2022-07-16 | Stop reason: HOSPADM

## 2022-07-13 RX ORDER — DEXTROSE MONOHYDRATE 25 G/50ML
25-50 INJECTION, SOLUTION INTRAVENOUS
Status: DISCONTINUED | OUTPATIENT
Start: 2022-07-13 | End: 2022-07-16 | Stop reason: HOSPADM

## 2022-07-13 RX ORDER — LIDOCAINE 40 MG/G
CREAM TOPICAL
Status: DISCONTINUED | OUTPATIENT
Start: 2022-07-13 | End: 2022-07-16 | Stop reason: HOSPADM

## 2022-07-13 RX ORDER — DULOXETIN HYDROCHLORIDE 30 MG/1
30 CAPSULE, DELAYED RELEASE ORAL 2 TIMES DAILY
Status: DISCONTINUED | OUTPATIENT
Start: 2022-07-13 | End: 2022-07-16 | Stop reason: HOSPADM

## 2022-07-13 RX ORDER — HYDRALAZINE HYDROCHLORIDE 20 MG/ML
10 INJECTION INTRAMUSCULAR; INTRAVENOUS EVERY 6 HOURS PRN
Status: DISCONTINUED | OUTPATIENT
Start: 2022-07-13 | End: 2022-07-15

## 2022-07-13 RX ORDER — ONDANSETRON 2 MG/ML
4 INJECTION INTRAMUSCULAR; INTRAVENOUS EVERY 6 HOURS PRN
Status: DISCONTINUED | OUTPATIENT
Start: 2022-07-13 | End: 2022-07-16 | Stop reason: HOSPADM

## 2022-07-13 RX ORDER — TOLTERODINE 4 MG/1
4 CAPSULE, EXTENDED RELEASE ORAL DAILY
Status: DISCONTINUED | OUTPATIENT
Start: 2022-07-13 | End: 2022-07-16 | Stop reason: HOSPADM

## 2022-07-13 RX ORDER — HYDRALAZINE HYDROCHLORIDE 20 MG/ML
10 INJECTION INTRAMUSCULAR; INTRAVENOUS EVERY 6 HOURS PRN
Status: DISCONTINUED | OUTPATIENT
Start: 2022-07-13 | End: 2022-07-13

## 2022-07-13 RX ORDER — FERROUS GLUCONATE 324(38)MG
324 TABLET ORAL EVERY OTHER DAY
Status: DISCONTINUED | OUTPATIENT
Start: 2022-07-13 | End: 2022-07-16 | Stop reason: HOSPADM

## 2022-07-13 RX ORDER — AMOXICILLIN 250 MG
2 CAPSULE ORAL 2 TIMES DAILY
Status: DISCONTINUED | OUTPATIENT
Start: 2022-07-13 | End: 2022-07-16 | Stop reason: HOSPADM

## 2022-07-13 RX ORDER — BUMETANIDE 2 MG/1
4 TABLET ORAL DAILY
Status: DISCONTINUED | OUTPATIENT
Start: 2022-07-13 | End: 2022-07-16 | Stop reason: HOSPADM

## 2022-07-13 RX ORDER — HYDRALAZINE HYDROCHLORIDE 20 MG/ML
5 INJECTION INTRAMUSCULAR; INTRAVENOUS ONCE
Status: COMPLETED | OUTPATIENT
Start: 2022-07-13 | End: 2022-07-13

## 2022-07-13 RX ORDER — HYDRALAZINE HYDROCHLORIDE 20 MG/ML
10 INJECTION INTRAMUSCULAR; INTRAVENOUS EVERY 6 HOURS PRN
Status: DISCONTINUED | OUTPATIENT
Start: 2022-07-13 | End: 2022-07-16 | Stop reason: HOSPADM

## 2022-07-13 RX ORDER — ONDANSETRON 4 MG/1
4 TABLET, ORALLY DISINTEGRATING ORAL EVERY 6 HOURS PRN
Status: DISCONTINUED | OUTPATIENT
Start: 2022-07-13 | End: 2022-07-16 | Stop reason: HOSPADM

## 2022-07-13 RX ORDER — REPAGLINIDE 1 MG/1
1 TABLET ORAL
Qty: 180 TABLET | Refills: 3 | Status: SHIPPED | OUTPATIENT
Start: 2022-07-13 | End: 2022-12-13

## 2022-07-13 RX ORDER — NICOTINE POLACRILEX 4 MG
15-30 LOZENGE BUCCAL
Status: DISCONTINUED | OUTPATIENT
Start: 2022-07-13 | End: 2022-07-16 | Stop reason: HOSPADM

## 2022-07-13 RX ORDER — HYDRALAZINE HYDROCHLORIDE 20 MG/ML
10 INJECTION INTRAMUSCULAR; INTRAVENOUS ONCE
Status: DISCONTINUED | OUTPATIENT
Start: 2022-07-13 | End: 2022-07-13

## 2022-07-13 RX ADMIN — SENNOSIDES AND DOCUSATE SODIUM 2 TABLET: 8.6; 5 TABLET ORAL at 08:29

## 2022-07-13 RX ADMIN — DULOXETINE 30 MG: 30 CAPSULE, DELAYED RELEASE ORAL at 20:48

## 2022-07-13 RX ADMIN — TRAMADOL HYDROCHLORIDE 50 MG: 50 TABLET, COATED ORAL at 12:30

## 2022-07-13 RX ADMIN — TRAMADOL HYDROCHLORIDE 50 MG: 50 TABLET, COATED ORAL at 04:48

## 2022-07-13 RX ADMIN — LEVOTHYROXINE SODIUM 150 MCG: 0.05 TABLET ORAL at 08:30

## 2022-07-13 RX ADMIN — METOPROLOL SUCCINATE 12.5 MG: 25 TABLET, FILM COATED, EXTENDED RELEASE ORAL at 08:31

## 2022-07-13 RX ADMIN — ACETAMINOPHEN 975 MG: 325 TABLET, FILM COATED ORAL at 21:19

## 2022-07-13 RX ADMIN — INSULIN ASPART 1 UNITS: 100 INJECTION, SOLUTION INTRAVENOUS; SUBCUTANEOUS at 18:01

## 2022-07-13 RX ADMIN — DULOXETINE 30 MG: 30 CAPSULE, DELAYED RELEASE ORAL at 08:30

## 2022-07-13 RX ADMIN — POTASSIUM CHLORIDE 20 MEQ: 750 TABLET, EXTENDED RELEASE ORAL at 08:30

## 2022-07-13 RX ADMIN — LOSARTAN POTASSIUM 25 MG: 25 TABLET, FILM COATED ORAL at 08:31

## 2022-07-13 RX ADMIN — HYDRALAZINE HYDROCHLORIDE 10 MG: 20 INJECTION, SOLUTION INTRAMUSCULAR; INTRAVENOUS at 04:50

## 2022-07-13 RX ADMIN — FERROUS GLUCONATE 324 MG: 324 TABLET ORAL at 08:30

## 2022-07-13 RX ADMIN — INSULIN ASPART 2 UNITS: 100 INJECTION, SOLUTION INTRAVENOUS; SUBCUTANEOUS at 12:38

## 2022-07-13 RX ADMIN — POTASSIUM CHLORIDE 20 MEQ: 750 TABLET, EXTENDED RELEASE ORAL at 20:49

## 2022-07-13 RX ADMIN — SODIUM CHLORIDE, PRESERVATIVE FREE: 5 INJECTION INTRAVENOUS at 04:21

## 2022-07-13 RX ADMIN — TOLTERODINE 4 MG: 4 CAPSULE, EXTENDED RELEASE ORAL at 08:30

## 2022-07-13 RX ADMIN — SENNOSIDES AND DOCUSATE SODIUM 2 TABLET: 8.6; 5 TABLET ORAL at 20:49

## 2022-07-13 RX ADMIN — REPAGLINIDE 1 MG: 1 TABLET ORAL at 22:10

## 2022-07-13 RX ADMIN — HYDRALAZINE HYDROCHLORIDE 5 MG: 20 INJECTION INTRAMUSCULAR; INTRAVENOUS at 09:15

## 2022-07-13 RX ADMIN — BUMETANIDE 4 MG: 2 TABLET ORAL at 08:29

## 2022-07-13 RX ADMIN — PREGABALIN 100 MG: 100 CAPSULE ORAL at 20:49

## 2022-07-13 RX ADMIN — ONDANSETRON 4 MG: 4 TABLET, ORALLY DISINTEGRATING ORAL at 11:17

## 2022-07-13 RX ADMIN — METOPROLOL SUCCINATE 25 MG: 25 TABLET, EXTENDED RELEASE ORAL at 20:49

## 2022-07-13 RX ADMIN — ACETAMINOPHEN 975 MG: 325 TABLET, FILM COATED ORAL at 14:16

## 2022-07-13 RX ADMIN — ACETAMINOPHEN 975 MG: 325 TABLET, FILM COATED ORAL at 06:20

## 2022-07-13 RX ADMIN — ATORVASTATIN CALCIUM 40 MG: 40 TABLET, FILM COATED ORAL at 08:31

## 2022-07-13 RX ADMIN — PREGABALIN 100 MG: 100 CAPSULE ORAL at 08:29

## 2022-07-13 RX ADMIN — HYDRALAZINE HYDROCHLORIDE 5 MG: 20 INJECTION, SOLUTION INTRAMUSCULAR; INTRAVENOUS at 14:22

## 2022-07-13 RX ADMIN — LOSARTAN POTASSIUM 25 MG: 25 TABLET, FILM COATED ORAL at 00:31

## 2022-07-13 RX ADMIN — FOLIC ACID 2 MG: 1 TABLET ORAL at 08:30

## 2022-07-13 RX ADMIN — PREGABALIN 100 MG: 100 CAPSULE ORAL at 00:25

## 2022-07-13 RX ADMIN — INSULIN ASPART 2 UNITS: 100 INJECTION, SOLUTION INTRAVENOUS; SUBCUTANEOUS at 09:13

## 2022-07-13 ASSESSMENT — ACTIVITIES OF DAILY LIVING (ADL): DEPENDENT_IADLS:: SHOPPING
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
## 2022-07-13 NOTE — PLAN OF CARE
"Received PT orders to evaluate and treat in AM following a fall at home. In AM, BP at 199/99 with similar BP's being measured prior to PT attempt. Pt's roommate present and adamant about pt returning home on this date. Education provided regarding need for safe assessment and concerns for BP increasing with movement. Reported that PT would re-attempt in PM once BP regulated. In PM, attempted pt again per okay from RN. BP: 168/80. Attempted to ask orientation questions to pt. Pt unable to provide answer for place despite being asked with simple language and frequent repetitions. Pt easily distracted. Pt also will turn away from writer and promptly stop speaking and gaze off into space. Unable to recall question asked by writer within 30 seconds or less. Difficulty with word finding as well. Roommate reports that pt had began having difficulty with memory ~6 months prior. Pt with difficulty attending to questions by writer and remaining on task as well as with increased lethargy. Discussed confusion concerns and pt being more appropriate for OT to initiate to assess cognition. Roommate asked therapist to \"ask her easier questions.\" RN present requested PT hold on therapy for today 2/2 hypertension. Roommate demonstrated frustration as PT reported that therapy would not be able to initiate until following day. Educated on safety concerns and need for accurate assessment to ensure appropriate discharge rec. Reported cognition concerns to OT. OT to initiate due to OBS status and need for cognition screen. Will continue to follow and initiate PT evaluation as appropriate to ensure an accurate and safe discharge recommendation.                     "

## 2022-07-13 NOTE — CONSULTS
Care Management Initial Consult    General Information  Assessment completed with: Other (Roommate)Odalis  Type of CM/SW Visit: Initial Assessment    Primary Care Provider verified and updated as needed: No   Readmission within the last 30 days:           Advance Care Planning: Advance Care Planning Reviewed: present on chart          Communication Assessment  Patient's communication style: spoken language (English or Bilingual)    Hearing Difficulty or Deaf: no        Cognitive  Cognitive/Neuro/Behavioral: .WDL except, orientation  Level of Consciousness: confused  Arousal Level: opens eyes spontaneously  Orientation: disoriented x 4  Mood/Behavior: flat affect, cooperative, withdrawn  Best Language: 0 - No aphasia  Speech: clear, slow    Living Environment:   People in home: other (see comments) (Roommate (odalis))     Current living Arrangements: house      Able to return to prior arrangements:  (TBD)       Family/Social Support:  Care provided by: self, other (see comments) (Roommate)  Provides care for: no one  Marital Status: Single  Other (specify) (Roommate)          Description of Support System: Supportive, Involved    Support Assessment: Adequate social supports    Current Resources:   Patient receiving home care services: No     Community Resources:    Equipment currently used at home:    Supplies currently used at home:      Employment/Financial:  Employment Status: retired        Financial Concerns:     Referral to Financial Worker: No       Lifestyle & Psychosocial Needs:  Social Determinants of Health     Tobacco Use: Medium Risk     Smoking Tobacco Use: Former Smoker     Smokeless Tobacco Use: Never Used   Alcohol Use: Not on file   Financial Resource Strain: Not on file   Food Insecurity: Not on file   Transportation Needs: Not on file   Physical Activity: Not on file   Stress: Not on file   Social Connections: Not on file   Intimate Partner Violence: Not on file   Depression: Not at risk     PHQ-2  Score: 2   Housing Stability: Not on file       Functional Status:  Prior to admission patient needed assistance:              Mental Health Status:  Mental Health Status:  (Not assessed)       Chemical Dependency Status:  Chemical Dependency Status:  (Not assessed)             Values/Beliefs:  Spiritual, Cultural Beliefs, Sikh Practices, Values that affect care: no               Additional Information:  Writer attempted to meet with pt, but pt was somnolent and unable to stay awake. Writer spoke with  pt's roommate/POA, Odalis. Odalis appears to be an excellent advocate for pt and does stress her concerns for pt. Discussed and completed LINARES paperwork with . Odalis. Writer answered any of Odalis's questions regarding insurance coverage. Writer encouraged Odalis to follow up with pt's insurance plan directly to receive the most accurate information. Odalis signed LINARES form and the form was placed on the patient's chart.    Odalis has a number of concerns if pt were to go to a TCU. Odalis thinks that pt may receive a better quality of care if she were to have pt return home and hire some private pay caregivers take care of pt. Writer and discussed private pay options and discussed HHC, which Odalis thinks may be less effective. Odalis reports that pt needs someone who will motivate her and feels like the infrequency of HHC is not enough for pts needs. Writer explained there are pros and cons to both TCU and HHC. Writer offered to explore TCU options tomorrow after PT eval.    ________________    NAKITA Fernandes, [facility]  ED/Observation   [facility]  Phone: [phone]  Pager: [phone]  Fax: [phone]    On-call pager, [phone], 4:00pm to midnight

## 2022-07-13 NOTE — PROGRESS NOTES
Observation Goals:   - Diagnostic tests and consults completed and resulted - Not met     - Vital signs normal or at patient baseline - Not met  Pt hypertensive.LIYAH notified.    - Tolerating oral intake to maintain hydration - NPO     - Returns to baseline functional status - Not met, appears confused. A/O to self    - Safe disposition plan has been identified - Not met

## 2022-07-13 NOTE — PROGRESS NOTES
ED OBSERVATION PROGRESS NOTE:  S: Mariel Ribeiro is a 77 year old female admitted on 7/12/2022. She has a PMHx of coronary atherosclerosis, chronic diastolic heart failure s/p CABG x3, angina at rest, transient cerebral ischemia, bilateral carotid artery disease, s/p coronary angiogram, T2DM, HLD hypothyroidism, thyroid cancer s/p thyroidectomy, osteoarthritis, COPD, myalgia and myositis, mononeuritis, BELEN, morbid obesity, cervicalgia, fatty liver disease (nonalcoholic), fibromyalgia, lymphedema, neuropathy, spinal stenosis of lumbar region, vitamin D deficiency, hepatomegaly, hemoptysis, hypertensive CKD stage 5, elevated C-reactive protein, h/o left breast cancer, arthritis, gout, GERD, and gait instability who presents to the ED after a fall.        Chief Complaint   Patient presents with     Generalized Weakness     1. Generalized weakness        Problem List:  Patient Active Problem List   Diagnosis     Hypothyroidism     Diverticulitis of colon     Coronary atherosclerosis     Myalgia and myositis     Migraine     Chronic airway obstruction/ COPD     Mononeuritis     Obstructive sleep apnea     Vitamin D deficiency     Hypertension goal BP (blood pressure) < 130/80     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)     Hyperlipidemia with target LDL less than 70     Chronic diastolic heart failure (H)     Osteoarthritis     Morbid obesity (H)     Arthritis of knee     Transient cerebral ischemia     History of thyroid cancer     Cervicalgia     Fatty liver disease, nonalcoholic     Hepatomegaly     Angina at rest (H)     S/P CABG x 3     Type 2 diabetes mellitus with stage 3 chronic kidney disease, with long-term current use of insulin (H)     Lymphedema     Lower back pain     Bilateral carotid artery disease (H)     Spinal stenosis of lumbar region, unspecified whether neurogenic claudication present     Status post coronary angiogram     Hemoptysis     Intertrigo     Malignant neoplasm of lower-inner quadrant  of left breast in female, estrogen receptor positive (H)     Pain in the coccyx     Facet arthropathy     CKD (chronic kidney disease) stage 4, GFR 15-29 ml/min (H)     Facet arthropathy, lumbar     Temporal pain     Elevated C-reactive protein     Hypertensive chronic kidney disease with stage 5 chronic kidney disease or end stage renal disease (H)     Generalized weakness       MEDS:   Current Outpatient Rx   Medication Sig Dispense Refill     repaglinide (PRANDIN) 1 MG tablet Take 1 tablet (1 mg) by mouth 3 times daily (before meals) 180 tablet 3       ALLERGIES:    Allergies   Allergen Reactions     Contrast Dye Anaphylaxis     Fish Allergy Anaphylaxis     Iodine Anaphylaxis     Oxycodone Other (See Comments)     Severe suicidal tendencies on this medication     Tree Nuts [Nuts] Anaphylaxis     Tree nuts only (peanuts ok)     Bactrim      Increased uric acid     Betadine [Povidone Iodine] Hives, Swelling and Difficulty breathing     Betadine     Combivent      Rash     Dulaglutide Other (See Comments)     Hx . Of thyroid cancer.     Fish      Lisinopril Other (See Comments)     Scr/grf severely reduced.      Penicillins Rash     Allopurinol Rash     Latex Rash     Wool Fiber Rash       O:BP (!) 156/74 (BP Location: Right arm)   Pulse 82   Temp 97.9  F (36.6  C) (Oral)   Resp 18   Wt 137.4 kg (303 lb)   SpO2 94%   BMI 46.76 kg/m      Physical Exam   Constitutional: Pt is oriented to person, place, and time.Pt appears well-developed and well-nourished.   HENT:   Head: Normocephalic and atraumatic.   Eyes: Conjunctivae are normal. Pupils are equal, round, and reactive to light.   Neck: Normal range of motion. Neck supple.   Cardiovascular: Normal rate, regular rhythm, normal heart sounds and intact distal pulses.    Pulmonary/Chest: Effort normal and breath sounds normal. No respiratory distress. Pt has no wheezes. Pt has no rales  Abdominal: Soft. Bowel sounds are normal. Pt exhibits no distension and no  mass. No tenderness. Pt has no rebound and no guarding.   Musculoskeletal: Normal range of motion. Pt exhibits no edema.   Neurological: Pt is alert and oriented to person, place, and time. Normal reflexes.   Skin: Skin is warm and dry. No rash noted.   Psychiatric: Pt has a normal mood and affect. Behavior is normal. Judgment and thought content normal.       Assessment & Plan     Mariel Ribeiro is a 77 year old female admitted on 7/12/2022. She has a PMHx of coronary atherosclerosis, chronic diastolic heart failure s/p CABG x3, angina at rest, transient cerebral ischemia, bilateral carotid artery disease, s/p coronary angiogram, T2DM, HLD hypothyroidism, thyroid cancer s/p thyroidectomy, osteoarthritis, COPD, myalgia and myositis, mononeuritis, BELEN, morbid obesity, cervicalgia, fatty liver disease (nonalcoholic), fibromyalgia, lymphedema, neuropathy, spinal stenosis of lumbar region, vitamin D deficiency, hepatomegaly, hemoptysis, hypertensive CKD stage 5, elevated C-reactive protein, h/o left breast cancer, arthritis, gout, GERD, and gait instability who presents to the ED after a fall.      ##Fall  ##Weakness  77-year-old female who presents After a fall this morning, with increasing confusion and difficulty ambulating over the past couple of weeks.  Patient presents with a roommate who reports that she is generally confused and not able to verbalize thoughts.  Patient has chronic pain of lower back and coccyx. Pt has recently undergone neuropsych testing As well as extensive primary care work-up in an effort to diagnose and treat her increasing fatigue and weakness. SHe has an appt with Neurology in October. Patient has not taken any of her normal home medications today. In the ED, patient is hypertensive 200/92.  Heart rate 80.  SPO2 94%.  Afebrile.  Labs show normal electrolytes, creatinine at baseline, 1.25.  Glucose 157.  Lipase 70.  Troponin 45, Repeat 52.  No leukocytosis, WBC 7.7.  Hemoglobin stable,  13.5.  UA is negative for infection. Chest XR negative. MRI Brain to rule out stroke is negative for stroke. CT abdomen was ordered today to evaluate LUQ bruise given recent fall, however patient's roommate Odalis reports the bruising is from her insulin injection and patient confirmed as well. CT abdomen therefore cancelled. She was admitted to ED Observation for further monitoring and management of weakness with PT/OT evaluation. PT assessment held for today 2/2 hypertension. Will reattempt tomorrow. Patient reports that she will not likely accept to go to rehab and wants to be discharged home tomorrow.   -Gentle IVF  -PT/OT eval pending   - consult    ##Troponemia:   Elevated troponin 45>52>107. On exam this morning, patient initially denied any symptoms of chest pain but within the same interview reported central chest pain that started sometime last night. She is unable to offer any other details regarding the chest pain.  She is a very poor historian due to her baseline confusion. BP has been very since admission 154//132. Cardiology was consulted. They think elevated troponin is likely secondary to HTN emergency. Recommend trending troponin to peak.  Troponin 45>52>107>104>99     Elevated troponin, likely related to HTN emergency  - recommend trending troponin to peak   - EKG PRN if change in clinical status      Severe hypertension   - PTA home medications   - IV hydralazine PRN for sys > 160  - recommend monitoring for PRES given AMS and change in BP     Chronic diastolic heart failure   -recommend irrigation of MEMS implant to more adequately assess volume status       ##T2DM  Last HgbA1c: 6.8 on March 9, 2022.   -Glucose checks QID  -Resume Oral antidiabetic agents while on ED observation.  - NovoLog sliding scale coverage  - Continue prior to admission Lantus 29 units, patient typically takes around 1 PM which is her first meal of the day.     ##HLD: Continue PTA Continue prior to admission  statin.     ##HFpEF  ##CAD  ##HTN  Pt follows in [facility]. Patient has longstanding heart failure now with mildly reduced ejection fraction (45-50%) status post CardioMEMS.  -Continue PTA Bumex, potassium, ASA  - Continue PTA losartan, metoprolol  -Daily weights     ##Chronic back pain  ##Headaches  -Continue prior to admission Lyrica, Tylenol scheduled, Tramadol as needed        Diet:   moderate carb  DVT Prophylaxis: Low Risk/Ambulatory with no VTE prophylaxis indicated  Gutierrez Catheter: Not present  Central Lines: None  Cardiac Monitoring: None  Code Status:   Full    Signed:  Kisha Hernandes PA-C  July 13, 2022 at 6:29 PM
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
## 2022-07-13 NOTE — PROGRESS NOTES
Observation Goals:   - Diagnostic tests and consults completed and resulted - Not met     - Vital signs normal or at patient baseline - Not met.   Pt hypertensive, pls see flow sheet    - Tolerating oral intake to maintain hydration - NPO     - Returns to baseline functional status - Not met, appears confused. A/O to self    - Safe disposition plan has been identified - Not met  The Pt was admitted for fall. A to self and family. Prn Hydralazine 5 mg was administered at 0915 and at 1420. BP monitored. Pt slowly trending down. She received Zofran ODT for nausea and effective.Ultram given for generalized pain. Turn and repo. Pt refused to be turned this AM. Writer stressed the importance of turning and repositioning and she has been turned. Refused legs to be elevated on pillows.Her diet was changed and is tolerating.Reddend groin area and red under breast.Barrier cream applied to buttock and groin area.Family here and supportive.

## 2022-07-13 NOTE — PROGRESS NOTES
Pt arrived to ED OBS from [facility] via liter, transferred to hospital bed in room by radhamat. LIYAH notified of arrival.

## 2022-07-13 NOTE — PROGRESS NOTES
Clinic Care Coordination Contact  Ambulatory Care Coordination to Inpatient Care Management   Hand-In Communication    Date:  July 13, 2022  Name: Mariel Ribeiro is enrolled in Ambulatory Care Coordination program and I am the Lead Care Coordinator.  CC Contact Information: Epic InBasket + phone  Payor Source: Payor: MEDICARE / Plan: MEDICARE / Product Type: Medicare /   Current services in place: [facility]   Please see the CC Snaphot and Care Management Flowsheets for specific  details of this Mariel Ribeiro care plan.     I will follow this admission in Epic. Please feel free to contact me with questions or for further collaboration in discharge planning.    Peggy Raines, RN Care Coordinator     [facility] Ambulatory Care Management  [facility]

## 2022-07-13 NOTE — PROGRESS NOTES
Emergency Medicine Observation Attending note    The patient was independently seen and examined by me. The chart, vital signs, and labs were reviewed. The patient's findings were discussed with the LIYAH on the observation unit, and I agree with the findings of the note and the plan.    77 year old female admitted on 7/12/2022. She has a PMHx of coronary atherosclerosis, chronic diastolic heart failure s/p CABG x3, angina at rest, transient cerebral ischemia, bilateral carotid artery disease, s/p coronary angiogram, T2DM, HLD hypothyroidism, thyroid cancer s/p thyroidectomy, osteoarthritis, COPD, myalgia and myositis, mononeuritis, BELEN, morbid obesity, cervicalgia, fatty liver disease (nonalcoholic), fibromyalgia, lymphedema, neuropathy, spinal stenosis of lumbar region, vitamin D deficiency, hepatomegaly, hemoptysis, hypertensive CKD stage 5, elevated C-reactive protein, h/o left breast cancer, arthritis, gout, GERD, and gait instability, admitted to ED OBS after presenting to the ER after a fall, which she reported she could not remember. Her roommate reported that she had had increased difficulty in walking the last couple of weeks, and had been shuffling more. Pt reported that she had had a nearly constant headache for a week. She reported increased confusion over the previous six months. UA neg for sign of infection, MRI brain neg for stroke, basic labs not revealing for source of weakness. She was admitted for PT/OT eval and gentle hydration. This morning she states that she's had some central chest pain all night. She also complains of some left upper quadrant abd pain. She has difficulty giving details.     BP (!) 206/105 (BP Location: Right arm)   Pulse 70   Temp 97.9  F (36.6  C) (Oral)   Resp 18   Wt 137.4 kg (303 lb)   SpO2 94%   BMI 46.76 kg/m      Exam:  General: awake, alert, NAD  HEENT: NC/AT  Neck: supple  Lungs: CTA-B  Heart: RRR, no M/R/G  Abd: soft, ND, + tenderness in LUQ with ecchymosis  seen.  Ext: no deformity    Assessment/plan:  1. Gradually worsening weakness and cognitive decline - no acute findings identified on head imaging. PT/OT and SW consult today.   2. Elevated trop - 45>52>107. She does report some chest pain overnight but is a generally poor historian, so we are not able to get details. Will d/w cardiology and get a repeat EKG. BPs have been very high. Suspect hypertensive emergency. Will give dose of hydralazine now.   3. LUQ abd pain and ecchymosis - given fall yesterday, will image.

## 2022-07-13 NOTE — PLAN OF CARE
Observation Goals:    - Diagnostic tests and consults completed and resulted - Not met    - Vital signs normal or at patient baseline - Not met. Hypertensive. PRN hydralazine given.  BP (!) 202/98 (BP Location: Right arm)   Pulse 84   Temp 97.5  F (36.4  C) (Oral)   Resp 18   Wt 137.4 kg (303 lb)   SpO2 94%   BMI 46.76 kg/m      - Tolerating oral intake to maintain hydration - Met     - Returns to baseline functional status - Not met    - Safe disposition plan has been identified - Not met

## 2022-07-14 ENCOUNTER — APPOINTMENT (OUTPATIENT)
Dept: PHYSICAL THERAPY | Facility: CLINIC | Age: 77
End: 2022-07-14
Attending: NURSE PRACTITIONER
Payer: MEDICARE

## 2022-07-14 LAB
ANION GAP SERPL CALCULATED.3IONS-SCNC: 11 MMOL/L (ref 7–15)
ATRIAL RATE - MUSE: 79 BPM
BUN SERPL-MCNC: 30 MG/DL (ref 8–23)
CALCIUM SERPL-MCNC: 9.7 MG/DL (ref 8.8–10.2)
CHLORIDE SERPL-SCNC: 104 MMOL/L (ref 98–107)
CREAT SERPL-MCNC: 1.5 MG/DL (ref 0.51–0.95)
DEPRECATED HCO3 PLAS-SCNC: 27 MMOL/L (ref 22–29)
DIASTOLIC BLOOD PRESSURE - MUSE: NORMAL MMHG
ERYTHROCYTE [DISTWIDTH] IN BLOOD BY AUTOMATED COUNT: 13.7 % (ref 10–15)
GFR SERPL CREATININE-BSD FRML MDRD: 36 ML/MIN/1.73M2
GLUCOSE BLDC GLUCOMTR-MCNC: 119 MG/DL (ref 70–99)
GLUCOSE BLDC GLUCOMTR-MCNC: 168 MG/DL (ref 70–99)
GLUCOSE BLDC GLUCOMTR-MCNC: 187 MG/DL (ref 70–99)
GLUCOSE BLDC GLUCOMTR-MCNC: 194 MG/DL (ref 70–99)
GLUCOSE BLDC GLUCOMTR-MCNC: 250 MG/DL (ref 70–99)
GLUCOSE SERPL-MCNC: 177 MG/DL (ref 70–99)
HCT VFR BLD AUTO: 44.2 % (ref 35–47)
HGB BLD-MCNC: 14 G/DL (ref 11.7–15.7)
INTERPRETATION ECG - MUSE: NORMAL
MCH RBC QN AUTO: 28.9 PG (ref 26.5–33)
MCHC RBC AUTO-ENTMCNC: 31.7 G/DL (ref 31.5–36.5)
MCV RBC AUTO: 91 FL (ref 78–100)
P AXIS - MUSE: 35 DEGREES
PLATELET # BLD AUTO: 129 10E3/UL (ref 150–450)
POTASSIUM SERPL-SCNC: 3.8 MMOL/L (ref 3.4–5.3)
PR INTERVAL - MUSE: 178 MS
QRS DURATION - MUSE: 82 MS
QT - MUSE: 510 MS
QTC - MUSE: 584 MS
R AXIS - MUSE: -36 DEGREES
RBC # BLD AUTO: 4.85 10E6/UL (ref 3.8–5.2)
SODIUM SERPL-SCNC: 142 MMOL/L (ref 136–145)
SYSTOLIC BLOOD PRESSURE - MUSE: NORMAL MMHG
T AXIS - MUSE: 126 DEGREES
TROPONIN T SERPL HS-MCNC: 77 NG/L
TSH SERPL DL<=0.005 MIU/L-ACNC: 1.98 UIU/ML (ref 0.3–4.2)
VENTRICULAR RATE- MUSE: 79 BPM
VIT B12 SERPL-MCNC: 384 PG/ML (ref 232–1245)
WBC # BLD AUTO: 9.6 10E3/UL (ref 4–11)

## 2022-07-14 PROCEDURE — 999N000111 HC STATISTIC OT IP EVAL DEFER: Performed by: OCCUPATIONAL THERAPIST

## 2022-07-14 PROCEDURE — G0378 HOSPITAL OBSERVATION PER HR: HCPCS

## 2022-07-14 PROCEDURE — 250N000013 HC RX MED GY IP 250 OP 250 PS 637: Performed by: NURSE PRACTITIONER

## 2022-07-14 PROCEDURE — 99204 OFFICE O/P NEW MOD 45 MIN: CPT | Mod: GC | Performed by: STUDENT IN AN ORGANIZED HEALTH CARE EDUCATION/TRAINING PROGRAM

## 2022-07-14 PROCEDURE — 36415 COLL VENOUS BLD VENIPUNCTURE: CPT | Performed by: PHYSICIAN ASSISTANT

## 2022-07-14 PROCEDURE — 84484 ASSAY OF TROPONIN QUANT: CPT

## 2022-07-14 PROCEDURE — 250N000013 HC RX MED GY IP 250 OP 250 PS 637: Performed by: PHYSICIAN ASSISTANT

## 2022-07-14 PROCEDURE — 82962 GLUCOSE BLOOD TEST: CPT

## 2022-07-14 PROCEDURE — 97161 PT EVAL LOW COMPLEX 20 MIN: CPT | Mod: GP

## 2022-07-14 PROCEDURE — 85027 COMPLETE CBC AUTOMATED: CPT | Performed by: PHYSICIAN ASSISTANT

## 2022-07-14 PROCEDURE — 84425 ASSAY OF VITAMIN B-1: CPT

## 2022-07-14 PROCEDURE — 36415 COLL VENOUS BLD VENIPUNCTURE: CPT

## 2022-07-14 PROCEDURE — 80048 BASIC METABOLIC PNL TOTAL CA: CPT | Performed by: PHYSICIAN ASSISTANT

## 2022-07-14 PROCEDURE — 96372 THER/PROPH/DIAG INJ SC/IM: CPT

## 2022-07-14 PROCEDURE — 97530 THERAPEUTIC ACTIVITIES: CPT | Mod: GP

## 2022-07-14 PROCEDURE — 258N000003 HC RX IP 258 OP 636: Performed by: EMERGENCY MEDICINE

## 2022-07-14 RX ADMIN — BUMETANIDE 4 MG: 2 TABLET ORAL at 08:08

## 2022-07-14 RX ADMIN — ACETAMINOPHEN 975 MG: 325 TABLET, FILM COATED ORAL at 14:52

## 2022-07-14 RX ADMIN — REPAGLINIDE 1 MG: 1 TABLET ORAL at 14:53

## 2022-07-14 RX ADMIN — SENNOSIDES AND DOCUSATE SODIUM 2 TABLET: 8.6; 5 TABLET ORAL at 20:01

## 2022-07-14 RX ADMIN — INSULIN ASPART 1 UNITS: 100 INJECTION, SOLUTION INTRAVENOUS; SUBCUTANEOUS at 20:03

## 2022-07-14 RX ADMIN — PREGABALIN 100 MG: 100 CAPSULE ORAL at 20:00

## 2022-07-14 RX ADMIN — REPAGLINIDE 1 MG: 1 TABLET ORAL at 22:27

## 2022-07-14 RX ADMIN — DULOXETINE 30 MG: 30 CAPSULE, DELAYED RELEASE ORAL at 20:00

## 2022-07-14 RX ADMIN — ATORVASTATIN CALCIUM 40 MG: 40 TABLET, FILM COATED ORAL at 08:07

## 2022-07-14 RX ADMIN — SODIUM CHLORIDE, PRESERVATIVE FREE: 5 INJECTION INTRAVENOUS at 06:28

## 2022-07-14 RX ADMIN — POTASSIUM CHLORIDE 20 MEQ: 750 TABLET, EXTENDED RELEASE ORAL at 08:07

## 2022-07-14 RX ADMIN — FOLIC ACID 2 MG: 1 TABLET ORAL at 08:07

## 2022-07-14 RX ADMIN — SODIUM CHLORIDE, PRESERVATIVE FREE: 5 INJECTION INTRAVENOUS at 22:32

## 2022-07-14 RX ADMIN — METOPROLOL SUCCINATE 12.5 MG: 25 TABLET, FILM COATED, EXTENDED RELEASE ORAL at 08:08

## 2022-07-14 RX ADMIN — TOLTERODINE 4 MG: 4 CAPSULE, EXTENDED RELEASE ORAL at 08:07

## 2022-07-14 RX ADMIN — SENNOSIDES AND DOCUSATE SODIUM 2 TABLET: 8.6; 5 TABLET ORAL at 08:07

## 2022-07-14 RX ADMIN — ACETAMINOPHEN 975 MG: 325 TABLET, FILM COATED ORAL at 21:18

## 2022-07-14 RX ADMIN — DULOXETINE 30 MG: 30 CAPSULE, DELAYED RELEASE ORAL at 08:06

## 2022-07-14 RX ADMIN — REPAGLINIDE 1 MG: 1 TABLET ORAL at 08:08

## 2022-07-14 RX ADMIN — INSULIN ASPART 2 UNITS: 100 INJECTION, SOLUTION INTRAVENOUS; SUBCUTANEOUS at 14:57

## 2022-07-14 RX ADMIN — LEVOTHYROXINE SODIUM 150 MCG: 0.05 TABLET ORAL at 08:06

## 2022-07-14 RX ADMIN — SODIUM CHLORIDE, PRESERVATIVE FREE: 5 INJECTION INTRAVENOUS at 14:50

## 2022-07-14 RX ADMIN — PREGABALIN 100 MG: 100 CAPSULE ORAL at 08:07

## 2022-07-14 RX ADMIN — POTASSIUM CHLORIDE 20 MEQ: 750 TABLET, EXTENDED RELEASE ORAL at 20:00

## 2022-07-14 RX ADMIN — LOSARTAN POTASSIUM 25 MG: 25 TABLET, FILM COATED ORAL at 08:07

## 2022-07-14 NOTE — PROGRESS NOTES
Care Management Follow Up    Length of Stay (days): 0    Expected Discharge Date: 07/14/2022     Concerns to be Addressed: discharge planning     Patient plan of care discussed at interdisciplinary rounds: Yes    Anticipated Discharge Disposition: Skilled Nursing Facility     Anticipated Discharge Services: None  Anticipated Discharge DME: None    Patient/family educated on Medicare website which has current facility and service quality ratings: yes  Education Provided on the Discharge Plan:    Patient/Family in Agreement with the Plan: no    Referrals Placed by CM/SW: Post Acute Facilities    Referrals have been made to the following facilities and their status is as follows:    [facility]  P: [phone]  F: [phone]  - Writer preemptively faxed referral packet for SNF to review.    [facility]  P: [phone]  P: [phone] - Admissions  F: [phone]  - Writer preemptively faxed referral packet for SNF to review.    [facility]  [address] [place]  [address]  P: [phone]  P: [phone] - Admissions  F: [phone]  - Writer preemptively faxed referral packet for SNF to review.    [facility]  [address] [place]  [address]  P: [phone]  P: [phone] - Admissions  F: [phone]  - Writer preemptively faxed referral packet for SNF to review.    [facility]  [address] [place] [address]  [address]  P: [phone]  P: [phone] - Admissions  F: [phone]  - Writer preemptively faxed referral packet for SNF to review.    Private pay costs discussed: Not applicable    Additional Information:  Chart reviewed. Case discussed with bedside RN and medical provider.  Discussed pt's somnolence and poor ability to answer questions. Neuro consult to be placed.    Writer met with Odalis, who is now open to discussing TCU placement. Discussed potential discharge needs to include TCU. List of Medicare certified options  reviewed with patient/family/caregiver. Offered list and patient's right to choose among available options. Explained any financial ties to [facility]. Odalis selected choices from SNFs listed above.    ________________    NAKITA Fernandes, [facility]  ED/Observation   [facility]  Phone: [phone]  Pager: [phone]  Fax: [phone]    On-call pager, [phone], 4:00pm to midnight

## 2022-07-14 NOTE — UTILIZATION REVIEW
Continued stay Observation     Concurrent stay review; Secondary Review Determination         Under the authority of the Utilization Management Committee, the utilization review process indicated a secondary review on the above patient.  The review outcome is based on review of the medical records, discussions with staff, and applying clinical experience noted on the date of the review.          (x) Observation Status Appropriate - Concurrent stay review    RATIONALE FOR DETERMINATION   77-year-old female with multiple medical problems was admitted to [facility] on 7/12/2022 with generalized weakness and fall.  MRI of the brain was negative for acute fracture or other acute intracranial findings.  Patient was found to have significant cognitive impairment during hospitalization and therefore neurology consult has been requested.  Rest of her work-up is unremarkable.  Patient does not meet inpatient criteria at this time, however after neurology evaluation if it is determined that patient needs extensive neurological work-up then inpatient could be considered at that time.      Patient is clinically improving and there is no clear indication to change patient's status to inpatient. The severity of illness, intensity of service provided, expected LOS and risk for adverse outcome make the care appropriate for observation.      This document was produced using voice recognition software       The information on this document is developed by the utilization review team in order for the business office to ensure compliance.  This only denotes the appropriateness of proper admission status and does not reflect the quality of care rendered.         The definitions of Inpatient Status and Observation Status used in making the determination above are those provided in the CMS Coverage Manual, Chapter 1 and Chapter 6, section 70.4.      Sincerely,     Martell Mcnulty MD    Utilization Review  Physician Advisor  [facility]  Services.

## 2022-07-14 NOTE — PLAN OF CARE
Observation Goals:    - Diagnostic tests and consults completed and resulted - Not met    - Vital signs normal or at patient baseline - Not met. Hypertensive.  BP (!) 141/59 (BP Location: Right arm)   Pulse 63   Temp 98.4  F (36.9  C)   Resp 16   Wt 137.4 kg (303 lb)   SpO2 97%   BMI 46.76 kg/m      - Tolerating oral intake to maintain hydration - Met    - Returns to baseline functional status - Not met    - Safe disposition plan has been identified - Not met
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
## 2022-07-14 NOTE — PROVIDER NOTIFICATION
Cardiac Device check done at bedside /c 6C RNs; Cardio Mems 28/9 per JODY Monzon [id number].  Will update LIYAH.

## 2022-07-14 NOTE — PLAN OF CARE
"                                                                              [facility]      OUTPATIENT PHYSICAL THERAPY EVALUATION  PLAN OF TREATMENT FOR OUTPATIENT REHABILITATION  (COMPLETE FOR INITIAL CLAIMS ONLY)  Patient's Last Name, First Name, M.I.  YOB: 1945  Mariel Ribeiro                        Provider's Name  [facility] Medical Record No.  [id number]                               Onset Date:  07/12/22   Start of Care Date:  07/14/22      Type:     _X_PT   ___OT   ___SLP Medical Diagnosis:  \"increasing confusion and difficulty ambulating\"                        PT Diagnosis:  impaired functional mobility   Visits from SOC:  1   _________________________________________________________________________________  Plan of Treatment/Functional Goals    Planned Interventions: balance training, bed mobility training, gait training, neuromuscular re-education, patient/family education, strengthening, transfer training, progressive activity/exercise, home exercise program     Goals: See Physical Therapy Goals on Care Plan in [employer] electronic health record.    Therapy Frequency: 5x/week  Predicted Duration of Therapy Intervention: 07/22/22  _________________________________________________________________________________    I CERTIFY THE NEED FOR THESE SERVICES FURNISHED UNDER        THIS PLAN OF TREATMENT AND WHILE UNDER MY CARE     (Physician co-signature of this document indicates review and certification of the therapy plan).              Certification date from: 07/14/22, Certification date to: 07/22/22    Referring Physician: Kathy Smith, CNP            Initial Assessment        See Physical Therapy evaluation dated 07/14/22 in Epic electronic health record.  "

## 2022-07-14 NOTE — PLAN OF CARE
After observation and conversation with this pt it is concluded that she does not have acute OT needs. Pt is currently unable to answer simple orientation questions and unable to follow simple one step commands. Pt also unable to mobilize without significant assist and if she were to return home at this time she would be at great risk of injury and further decompensation. Pt will require TCU with potential for LTC if she continues in this trajectory. Defer acute OT today.

## 2022-07-14 NOTE — PLAN OF CARE
Observation Goals:    - Diagnostic tests and consults completed and resulted - Not met    - Vital signs normal or at patient baseline - Not met. Hypertensive.  BP (!) 143/58 (BP Location: Right arm, Cuff Size: Adult Regular)   Pulse 72   Temp 97.7  F (36.5  C) (Oral)   Resp 20   Wt 137.4 kg (303 lb)   SpO2 92%   BMI 46.76 kg/m      - Tolerating oral intake to maintain hydration - Met    - Returns to baseline functional status - Not met    - Safe disposition plan has been identified - Not met
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
## 2022-07-14 NOTE — PROGRESS NOTES
"   07/14/22 1200   Quick Adds   Type of Visit Initial PT Evaluation   Living Environment   People in Home other (see comments)  (roommate)   Current Living Arrangements house  (rambler)   Home Accessibility no concerns   Transportation Anticipated family or friend will provide   Living Environment Comments Pt lives in a rambler with roommate (Odalis) who provides near 24/7 supervision/assist as needed.   Self-Care   Usual Activity Tolerance fair   Current Activity Tolerance poor   Regular Exercise No   Equipment Currently Used at Home cane, straight;walker, rolling  (4WW)   Fall history within last six months yes   Number of times patient has fallen within last six months 3   Activity/Exercise/Self-Care Comment Pt does not exercise. Occasionally requires assist for ambulation from roommate when she experiences \"wobbly legs\". Requires assistance getting LE into bed at times.   General Information   Onset of Illness/Injury or Date of Surgery 07/12/22   Referring Physician Kathy Smith CNP   Patient/Family Therapy Goals Statement (PT) return home   Pertinent History of Current Problem (include personal factors and/or comorbidities that impact the POC) Per EMR: \"77 year old female admitted on 7/12/2022. She has a PMHx of coronary atherosclerosis, chronic diastolic heart failure s/p CABG x3, angina at rest, transient cerebral ischemia, bilateral carotid artery disease, s/p coronary angiogram, T2DM, HLD hypothyroidism, thyroid cancer s/p thyroidectomy, osteoarthritis, COPD, myalgia and myositis, mononeuritis, BELEN, morbid obesity, cervicalgia, fatty liver disease (nonalcoholic), fibromyalgia, lymphedema, neuropathy, spinal stenosis of lumbar region, vitamin D deficiency, hepatomegaly, hemoptysis, hypertensive CKD stage 5, elevated C-reactive protein, h/o left breast cancer, arthritis, gout, GERD, and gait instability who presents to the ED after a fall. \"   Existing Precautions/Restrictions fall   Weight-Bearing " "Status - LUE full weight-bearing   Weight-Bearing Status - RUE full weight-bearing   Weight-Bearing Status - LLE full weight-bearing   Weight-Bearing Status - RLE full weight-bearing   Cognition   Affect/Mental Status (Cognition) confused;low arousal/lethargic   Orientation Status (Cognition) oriented to;person   Follows Commands (Cognition) does not follow one-step commands   Cognitive Status Comments Pt with significant difficulty attending to and following 1 step commands, oriented to self. Roommate states pt usually has \"memory problems\", but states she is currently significantly below baseline.   Pain Assessment   Patient Currently in Pain Yes, see Vital Sign flowsheet   Integumentary/Edema   Integumentary/Edema other (describe)   Integumentary/Edema Comments Pt's roommates states pt has BLE lymphedema, non pitting upon observation   Range of Motion (ROM)   Range of Motion ROM is WFL   Strength (Manual Muscle Testing)   Strength (Manual Muscle Testing) Deficits observed during functional mobility   Strength Comments Demonstrates <3/5 in BLE, unable to lift legs from bed   Bed Mobility   Bed Mobility rolling left;rolling right   Rolling Left [place] (Bed Mobility) dependent (less than 25% patient effort)   Rolling Right [place] (Bed Mobility) dependent (less than 25% patient effort)   Impairments Contributing to Impaired Bed Mobility decreased strength  (difficulty attending to and following commands)   Comment, (Bed Mobility) max x3 to roll   Transfers   Comment, (Transfers) unable to assess this date   Gait/Stairs (Locomotion)   Comment, (Gait/Stairs) unable to assess this date   Balance   Balance Comments unable to assess this date   Sensory Examination   Sensory Perception Comments unable to assess this date   Coordination   Coordination Comments unable to assess this date   Muscle Tone   Muscle Tone Comments unable to assess this date   Clinical Impression   Criteria for Skilled Therapeutic " "Intervention Yes, treatment indicated   PT Diagnosis (PT) impaired functional mobility   Influenced by the following impairments decreased strength, increased confusion/difficulty following commands, decreased arousal   Functional limitations due to impairments difficulty with bed mobility and functional transfers   Clinical Presentation (PT Evaluation Complexity) Evolving/Changing   Clinical Presentation Rationale per clinical judgement   Clinical Decision Making (Complexity) low complexity   Planned Therapy Interventions (PT) balance training;bed mobility training;gait training;neuromuscular re-education;patient/family education;strengthening;transfer training;progressive activity/exercise;home exercise program   Anticipated Equipment Needs at Discharge (PT)   (TBD)   Risk & Benefits of therapy have been explained evaluation/treatment results reviewed;care plan/treatment goals reviewed;risks/benefits reviewed;patient;caregiver   PT Discharge Planning   PT Discharge Recommendation (DC Rec) Transitional Care Facility   PT Rationale for DC Rec PT: Pt is significantly below functional baseline (both physical and cognitive), at this time requiring max Ax3 to roll and unable to follow 1 step commands. Would benefit from ongoing PT in a rehab setting to improve strength and functional mobility. Pt and roommate now in agreement with plan.   PT Brief overview of current status Nursing: overhead lift for mobility   Therapy Certification   Start of care date 07/14/22   Certification date from 07/14/22   Certification date to 07/22/22   Medical Diagnosis \"increasing confusion and difficulty ambulating\"   Total Evaluation Time   Total Evaluation Time (Minutes) 8   Physical Therapy Goals   PT Frequency 5x/week   PT Predicted Duration/Target Date for Goal Attainment 07/22/22   PT Goals Bed Mobility;Transfers;Gait   PT: Bed Mobility Supervision/stand-by assist   PT: Transfers Supervision/stand-by assist   PT: Gait " Supervision/stand-by assist  (LRAD)

## 2022-07-14 NOTE — PROGRESS NOTES
Informed Odalis, pt's roommate, that we would like her to bring in patient's home Lantus pen yet today for her daily dose.

## 2022-07-14 NOTE — PROGRESS NOTES
-diagnostic tests and consults completed and resulted: Not met   -vital signs normal or at patient baseline: Not met. Elevated BP   -tolerating oral intake to maintain hydration: Tolerated late lunch  -returns to baseline functional status: Not met. Patient A&O to self and room mate only, still has generalized weakness   -safe disposition plan has been identified: Not met.    BP (!) 156/74 (BP Location: Right arm)   Pulse 82   Temp 97.9  F (36.6  C) (Oral)   Resp 18   Wt 137.4 kg (303 lb)   SpO2 94%   BMI 46.76 kg/m

## 2022-07-14 NOTE — PROGRESS NOTES
Remote monitoring of Pulmonary Artery Pressures via CardioMEMS device reviewed at least weekly and as needed with [facility] nursing. Diuretics adjusted accordingly.  30 days of remote monitoring completed.     Billing dates of 6/3/22 through 7/2/22.        [facility]

## 2022-07-14 NOTE — PROGRESS NOTES
[facility]    Medicine Progress Note - ED Observation   Date of Admission:  7/12/2022    Assessment & Plan            Mariel Ribeiro is a 77 year old female admitted on 7/12/2022. She has a PMHx of coronary atherosclerosis, chronic diastolic heart failure s/p CABG x3, angina at rest, transient cerebral ischemia, bilateral carotid artery disease, s/p coronary angiogram, T2DM, HLD hypothyroidism, thyroid cancer s/p thyroidectomy, osteoarthritis, COPD, myalgia and myositis, mononeuritis, BELEN, morbid obesity, cervicalgia, fatty liver disease (nonalcoholic), fibromyalgia, lymphedema, neuropathy, spinal stenosis of lumbar region, vitamin D deficiency, hepatomegaly, hemoptysis, hypertensive CKD stage 5, elevated C-reactive protein, h/o left breast cancer, arthritis, gout, GERD, and gait instability who presents to the ED after a fall.      ##Fall  ##Weakness  77-year-old female who presents After a fall this morning, with increasing confusion and difficulty ambulating over the past couple of weeks.  Patient presents with a roommate who reports that she is generally confused and not able to verbalize thoughts.  Patient has chronic pain of lower back and coccyx. Pt has recently undergone neuropsych testing As well as extensive primary care work-up in an effort to diagnose and treat her increasing fatigue and weakness. SHe has an appt with Neurology in October. Patient has not taken any of her normal home medications today. In the ED, patient is hypertensive 200/92.  Heart rate 80.  SPO2 94%.  Afebrile.  Labs show normal electrolytes, creatinine at baseline, 1.25.  Glucose 157.  Lipase 70.  Troponin 45, Repeat 52.  No leukocytosis, WBC 7.7.  Hemoglobin stable, 13.5.  UA is negative for infection. Chest XR negative. MRI Brain to rule out stroke is negative for stroke. CT abdomen was ordered today to evaluate LUQ bruise given recent fall, however patient's roommate Odalis reports  the bruising is from her insulin injection and patient confirmed as well. CT abdomen therefore cancelled. She was admitted to ED Observation for further monitoring and management of weakness with PT/OT evaluation. PT assessment held for today 2/2 hypertension. Blood pressure is within normal limits today. Patient was seen by PT/OT who recommended TCU. Patient was also evaluated by neuro with recommendations listed below. SW to continue disposition to TCU coordination.   -Gentle IVF  -PT/OT eval both recommending TCU, SW aware  -SW consult  -Neuro Consult w/ Recs:   - check RPR, B1, B12 (ordered)   - keep outpatient neurology appointment (scheduled for 10/12/2022)   - history of sleep apnea, would benefit from follow up and further evaluation       ##Troponemia:   Elevated troponin 45>52>107. On exam this morning, patient initially denied any symptoms of chest pain but within the same interview reported central chest pain that started sometime last night. She is unable to offer any other details regarding the chest pain.  She is a very poor historian due to her baseline confusion. BP has been very since admission 154//132. Cardiology was consulted. They think elevated troponin is likely secondary to HTN emergency. Recommend trending troponin to peak.  Troponin 45>52>107>104>99     Elevated troponin, likely related to HTN emergency  Troponin 45>52>107>104>99. Patient denies any chest pain. Blood pressure within normal limits today.   - recommend trending troponin to peak   - EKG PRN if change in clinical status      Severe hypertension   Cardio Mems was interrogated by 6C nurse who reported 28/9. Spoke to cardiology who eported this is an okay volume status and does not indicate concern.  - PTA home medications   - IV hydralazine PRN for sys > 160       Chronic diastolic heart failure   Cardio Mems was interrogated by 6C nurse who reported 28/9. Spoke to cardiology who eported this is an okay volume status and  does not indicate concern.       ##T2DM  Last HgbA1c: 6.8 on March 9, 2022.   -Glucose checks QID  -Resume Oral antidiabetic agents while on ED observation.  - NovoLog sliding scale coverage  - Continue prior to admission Lantus 29 units, patient typically takes around 1 PM which is her first meal of the day.     ##HLD: Continue PTA Continue prior to admission statin.     ##HFpEF  ##CAD  ##HTN  Pt follows in [facility] clinic. Patient has longstanding heart failure now with mildly reduced ejection fraction (45-50%) status post CardioMEMS. Cardio Mems was interrogated by 6C nurse who reported 28/9. Spoke to cardiology who eported this is an okay volume status and does not indicate concern.  -Continue PTA Bumex, potassium, ASA  - Continue PTA losartan, metoprolol  -Cardiology recommends: - IV hydralazine PRN for sys > 160  -Daily weights       ##Chronic back pain  ##Headaches  -Continue prior to admission Lyrica, Tylenol scheduled, Tramadol as needed       Diet: Moderate Consistent Carb (60 g CHO per Meal) Diet    DVT Prophylaxis: Pneumatic Compression Devices  Gutierrez Catheter: Not present  Central Lines: None  Cardiac Monitoring: ACTIVE order. Indication: monitor for arhythmia  Code Status: Full Code      Disposition Plan      Expected Discharge Date: 07/14/2022                The patient's care was discussed with the Attending Physician, Dr. Westbrook, Bedside Nurse, Care Coordinator/, Patient, Patient's Family and PT/OT Team.    Anne Joseph, CHANTAL CNP  ED Observation   [facility]  Securely message with the Vocera Web Console (learn more here)  Text page via [employer] Paging/Directory         Clinically Significant Risk Factors Present on Admission          # Hypercalcemia: Ca = 10.2 mg/dL (Ref range: 8.8 - 10.2 mg/dL) and/or iCa = N/A on admission, will monitor as appropriate        # Hypertension: home medication list includes antihypertensive(s)           ______________________________________________________________________    Interval History   No events overnight. Patients blood pressures normal overnight. Patient to be seen by PT and OT today. Discharge pending disposition recommendations from PT/OT    Data reviewed today: I reviewed all medications, new labs and imaging results over the last 24 hours.    Physical Exam   Vital Signs: Temp: 98.1  F (36.7  C) Temp src: Axillary BP: 139/67 Pulse: 53   Resp: 16 SpO2: 98 % O2 Device: Nasal cannula Oxygen Delivery: 1 LPM  Weight: 303 lbs 0 oz    Constitutional: Pt is alert and oriented to self only. Difficulty word finding and answering questions. .Pt appears well-developed and well-nourished.   HENT:   Head: Normocephalic and atraumatic.   Eyes: Conjunctivae are normal. Pupils are equal, round, and reactive to light.   Neck: Normal range of motion. Neck supple.   Cardiovascular: Normal rate, regular rhythm, normal heart sounds and intact distal pulses.    Pulmonary/Chest: Effort normal and breath sounds normal. No respiratory distress. Pt has no wheezes. Pt has no rales  Abdominal: Soft. Bowel sounds are normal. Pt exhibits no distension and no mass. No tenderness. Pt has no rebound and no guarding.   Musculoskeletal: Normal range of motion. Pt exhibits no edema.   Neurological: Pt is alert and oriented to self only. Difficulty word finding and answering questions.   Skin: Skin is warm and dry. No rash noted.   Psychiatric: Pt has flat affect. Difficulty answering questions.        Data   Recent Labs   Lab 07/14/22  0759 07/13/22  2118 07/13/22  1800 07/13/22  0855 07/13/22  0619 07/12/22  1819 07/12/22  1423   WBC  --   --   --   --  9.4  --  7.7   HGB  --   --   --   --  14.9  --  13.5   MCV  --   --   --   --  91  --  90   PLT  --   --   --   --  142*  --  149*   NA  --   --   --   --  144  --  145   POTASSIUM  --   --   --   --  3.4  --  3.7   CHLORIDE  --   --   --   --  107  --  107   CO2  --   --   --   --   21*  --  25   BUN  --   --   --   --  24.3*  --  31.1*   CR  --   --   --   --  0.98*  --  1.25*   ANIONGAP  --   --   --   --  16*  --  13   ANTOINETTE  --   --   --   --  10.2  --  10.3*   * 179* 176*  176*  176*   < > 185*   < > 157*   ALBUMIN  --   --   --   --   --   --  4.0   PROTTOTAL  --   --   --   --   --   --  6.4   BILITOTAL  --   --   --   --   --   --  0.4   ALKPHOS  --   --   --   --   --   --  95   ALT  --   --   --   --   --   --  17   AST  --   --   --   --   --   --  20   LIPASE  --   --   --   --   --   --  70*    < > = values in this interval not displayed.     No results found for this or any previous visit (from the past 24 hour(s)).  Medications     sodium chloride 75 mL/hr at 07/14/22 0821       acetaminophen  975 mg Oral Q8H     atorvastatin  40 mg Oral Daily     bumetanide  4 mg Oral Daily     DULoxetine  30 mg Oral BID     ferrous gluconate  324 mg Oral Every Other Day     folic acid  2 mg Oral Daily     insulin aspart  1-7 Units Subcutaneous TID AC     insulin aspart  1-5 Units Subcutaneous At Bedtime     insulin glargine  29 Units Subcutaneous Daily     levothyroxine  150 mcg Oral Daily     losartan  25 mg Oral Daily     metoprolol succinate ER  25 mg Oral Daily     metoprolol succinate ER  12.5 mg Oral Daily     potassium chloride ER  20 mEq Oral BID     pregabalin  100 mg Oral BID     repaglinide  1 mg Oral TID AC     senna-docusate  1 tablet Oral BID    Or     senna-docusate  2 tablet Oral BID     sodium chloride (PF)  3 mL Intracatheter Q8H     tolterodine ER  4 mg Oral Daily

## 2022-07-15 LAB
GLUCOSE BLDC GLUCOMTR-MCNC: 120 MG/DL (ref 70–99)
GLUCOSE BLDC GLUCOMTR-MCNC: 161 MG/DL (ref 70–99)
GLUCOSE BLDC GLUCOMTR-MCNC: 197 MG/DL (ref 70–99)
GLUCOSE BLDC GLUCOMTR-MCNC: 221 MG/DL (ref 70–99)
HOLD SPECIMEN: NORMAL
HOLD SPECIMEN: NORMAL
T PALLIDUM AB SER QL: NONREACTIVE

## 2022-07-15 PROCEDURE — 82962 GLUCOSE BLOOD TEST: CPT

## 2022-07-15 PROCEDURE — 250N000013 HC RX MED GY IP 250 OP 250 PS 637: Performed by: PHYSICIAN ASSISTANT

## 2022-07-15 PROCEDURE — 258N000003 HC RX IP 258 OP 636: Performed by: EMERGENCY MEDICINE

## 2022-07-15 PROCEDURE — 96372 THER/PROPH/DIAG INJ SC/IM: CPT

## 2022-07-15 PROCEDURE — 36415 COLL VENOUS BLD VENIPUNCTURE: CPT

## 2022-07-15 PROCEDURE — 86780 TREPONEMA PALLIDUM: CPT

## 2022-07-15 PROCEDURE — G0378 HOSPITAL OBSERVATION PER HR: HCPCS

## 2022-07-15 PROCEDURE — 250N000013 HC RX MED GY IP 250 OP 250 PS 637: Performed by: NURSE PRACTITIONER

## 2022-07-15 RX ADMIN — ACETAMINOPHEN 975 MG: 325 TABLET, FILM COATED ORAL at 12:55

## 2022-07-15 RX ADMIN — INSULIN ASPART 1 UNITS: 100 INJECTION, SOLUTION INTRAVENOUS; SUBCUTANEOUS at 09:55

## 2022-07-15 RX ADMIN — ATORVASTATIN CALCIUM 40 MG: 40 TABLET, FILM COATED ORAL at 07:53

## 2022-07-15 RX ADMIN — TOLTERODINE 4 MG: 4 CAPSULE, EXTENDED RELEASE ORAL at 07:56

## 2022-07-15 RX ADMIN — SENNOSIDES AND DOCUSATE SODIUM 1 TABLET: 8.6; 5 TABLET ORAL at 20:58

## 2022-07-15 RX ADMIN — SENNOSIDES AND DOCUSATE SODIUM 1 TABLET: 8.6; 5 TABLET ORAL at 07:56

## 2022-07-15 RX ADMIN — LEVOTHYROXINE SODIUM 150 MCG: 0.05 TABLET ORAL at 07:54

## 2022-07-15 RX ADMIN — POTASSIUM CHLORIDE 20 MEQ: 750 TABLET, EXTENDED RELEASE ORAL at 07:55

## 2022-07-15 RX ADMIN — DULOXETINE 30 MG: 30 CAPSULE, DELAYED RELEASE ORAL at 07:53

## 2022-07-15 RX ADMIN — ACETAMINOPHEN 975 MG: 325 TABLET, FILM COATED ORAL at 05:34

## 2022-07-15 RX ADMIN — POTASSIUM CHLORIDE 20 MEQ: 750 TABLET, EXTENDED RELEASE ORAL at 20:58

## 2022-07-15 RX ADMIN — PREGABALIN 100 MG: 100 CAPSULE ORAL at 20:58

## 2022-07-15 RX ADMIN — FOLIC ACID 2 MG: 1 TABLET ORAL at 07:54

## 2022-07-15 RX ADMIN — METOPROLOL SUCCINATE 12.5 MG: 25 TABLET, FILM COATED, EXTENDED RELEASE ORAL at 07:55

## 2022-07-15 RX ADMIN — LOSARTAN POTASSIUM 25 MG: 25 TABLET, FILM COATED ORAL at 07:54

## 2022-07-15 RX ADMIN — SODIUM CHLORIDE, PRESERVATIVE FREE: 5 INJECTION INTRAVENOUS at 11:51

## 2022-07-15 RX ADMIN — ACETAMINOPHEN 975 MG: 325 TABLET, FILM COATED ORAL at 22:08

## 2022-07-15 RX ADMIN — REPAGLINIDE 1 MG: 1 TABLET ORAL at 22:08

## 2022-07-15 RX ADMIN — REPAGLINIDE 1 MG: 1 TABLET ORAL at 07:55

## 2022-07-15 RX ADMIN — DULOXETINE 30 MG: 30 CAPSULE, DELAYED RELEASE ORAL at 20:57

## 2022-07-15 RX ADMIN — REPAGLINIDE 1 MG: 1 TABLET ORAL at 14:07

## 2022-07-15 RX ADMIN — METOPROLOL SUCCINATE 25 MG: 25 TABLET, EXTENDED RELEASE ORAL at 20:57

## 2022-07-15 RX ADMIN — PREGABALIN 100 MG: 100 CAPSULE ORAL at 07:55

## 2022-07-15 RX ADMIN — BUMETANIDE 4 MG: 2 TABLET ORAL at 07:53

## 2022-07-15 RX ADMIN — FERROUS GLUCONATE 324 MG: 324 TABLET ORAL at 08:05

## 2022-07-15 NOTE — PLAN OF CARE
"Outpatient/Observation goals to be met before discharge home:  - Diagnostic tests and consults completed and resulted - Not met, PT Eval - recommending TCU  - Vital signs normal or at patient baseline - Not met. Hypertensive/Hypotensive - BP inconsistent   BP (!) 158/96 (BP Location: Right arm)   Pulse 63   Temp 98.1  F (36.7  C) (Oral)   Resp 18   Wt 137.4 kg (303 lb)   SpO2 98%   BMI 46.76 kg/m    - Tolerating oral intake to maintain hydration - Met  - Returns to baseline functional status - Not met, patient unable to lift legs off bed, unable to roll in bed, needing Assist 2-3 for bed mobility.   - Safe disposition plan has been identified - Not met, awaiting TCU Placement    Baseline: Per roommate Odalis, patient's baseline is SBA /c Cane with Assist x1 at times. She endorses that patient has been \"shuffling\" more lately. Able to swing Left leg into bed, but has been needing help with her right leg.   "

## 2022-07-15 NOTE — PLAN OF CARE
Goal Outcome Evaluation:/62 (BP Location: Left arm)   Pulse 54   Temp 98.1  F (36.7  C) (Axillary)   Resp 18   Wt 137.4 kg (303 lb)   SpO2 97%   BMI 46.76 kg/m    -diagnostic tests and consults completed and resulted : Met   -vital signs normal or at patient baseline : Met   -tolerating oral intake to maintain hydration :Met   -returns to baseline functional status:Not met    -safe disposition plan has been identified: Not met

## 2022-07-15 NOTE — PLAN OF CARE
"Outpatient/Observation goals to be met before discharge home:  - Diagnostic tests and consults completed and resulted - Not met, PT Eval - recommending TCU  - Vital signs normal or at patient baseline - Not met. Hypertensive/Hypotensive - BP inconsistent   - Tolerating oral intake to maintain hydration - Met  - Returns to baseline functional status - Not met, patient unable to lift legs off bed, unable to roll in bed, needing Assist 2-3 for bed mobility.   - Safe disposition plan has been identified - Not met, awaiting TCU Placement    Baseline: Per roommate Odalis, patient's baseline is SBA /c Cane with Assist x1 at times. She endorses that patient has been \"shuffling\" more lately. Able to swing Left leg into bed, but has been needing help with her right leg.   "

## 2022-07-15 NOTE — PROGRESS NOTES
-diagnostic tests and consults completed and resulted: Met   -vital signs normal or at patient baseline: Met   -tolerating oral intake to maintain hydration: Met   -returns to baseline functional status: Not met    -safe disposition plan has been identified: Not met

## 2022-07-15 NOTE — PLAN OF CARE
Goal Outcome Evaluation:BP (!) 142/61 (BP Location: Right arm)   Pulse 56   Temp 98.5  F (36.9  C) (Oral)   Resp 20   Wt 137.4 kg (303 lb)   SpO2 93%   BMI 46.76 kg/m    -diagnostic tests and consults completed and resulted : Met   -vital signs normal or at patient baseline : Met   -tolerating oral intake to maintain hydration :Met   -returns to baseline functional status:Not met    -safe disposition plan has been identified: Not met

## 2022-07-15 NOTE — PROGRESS NOTES
[facility]    Medicine Progress Note - Hospitalist Service    Date of Admission:  7/12/2022    Assessment & Plan        Mariel Ribeiro is a 77 year old female admitted on 7/12/2022. She has a PMHx of coronary atherosclerosis, chronic diastolic heart failure s/p CABG x3, angina at rest, transient cerebral ischemia, bilateral carotid artery disease, s/p coronary angiogram, T2DM, HLD hypothyroidism, thyroid cancer s/p thyroidectomy, osteoarthritis, COPD, myalgia and myositis, mononeuritis, BELEN, morbid obesity, cervicalgia, fatty liver disease (nonalcoholic), fibromyalgia, lymphedema, neuropathy, spinal stenosis of lumbar region, vitamin D deficiency, hepatomegaly, hemoptysis, hypertensive CKD stage 5, elevated C-reactive protein, h/o left breast cancer, arthritis, gout, GERD, and gait instability who presents to the ED after a fall on 7/12.      ##Fall  ##Weakness  77-year-old female who presents After a fall on the morning of 7/12 with increasing confusion and difficulty ambulating over the past couple of weeks.  Patient presents with a roommate who reports that she is generally confused and not able to verbalize thoughts.  Patient has chronic pain of lower back and coccyx. Pt has recently undergone neuropsych testing as well as extensive primary care work-up in an effort to diagnose and treat her increasing fatigue and weakness. She has an appt with Neurology in October. Patient has not taken any of her normal home medications today. UA is negative for infection. Chest XR negative. MRI Brain to rule out stroke is negative for stroke. CT abdomen was ordered today to evaluate LUQ bruise given recent fall, however patient's roommate Odalis reports the bruising is from her insulin injection and patient confirmed as well. CT abdomen therefore cancelled. Patient was seen by PT/OT who recommended TCU. Patient was also evaluated by neuro with recommendations listed below. SW to  continue disposition to TCU coordination.   -PT/OT eval both recommending TCU, SW aware  -SW consult per social work, will discharge on Sunday   -Neuro Consult w/ Recs:              - check RPR negative , B1 pending , B12 384              - keep outpatient neurology appointment (scheduled for 10/12/2022)              - history of sleep apnea, Referral for sleep clinic at discharge    ## CKD : Cr 1.5 today (BL appear 1.3)  - Repeat BMP in am     ##Troponemia, likely related to HTN emergency  Elevated troponin 45>52>107. On exam this morning, patient initially denied any symptoms of chest pain. She is unable to offer any other details regarding the chest pain.  She is a very poor historian due to her baseline confusion.  Cardiology was consulted. They think elevated troponin is likely secondary to HTN emergency. Recommend trending troponin to peak.  Troponin 45>52>107>104>99  - EKG PRN if change in clinical status      # Severe hypertension   Typically 130s/60s with occasional SBP in 120s or 150s. Per Roommate, patient has had syncopal episodes with higher doses of losartan in the past. Currently on Losartan 25 mg daily.   - Continue PTA losartan 25 mg daily   - Continue Metoprolol 12.5 mg XL daily. And 25 mg in the evening  - IV hydralazine PRN for sys > 160     ##T2DM  Last HgbA1c: 6.8 on March 9, 2022. /161  -Glucose checks QID  - Continue Prandin daily .  - NovoLog sliding scale coverage  - Continue prior to admission Lantus 29 units, patient typically takes around 1 PM which is her first meal of the day.     ##HLD: Continue PTA Continue prior to admission statin.     ##HFpEF  ##CAD  ##HTN  Pt follows in [facility] clinic. Patient has longstanding heart failure now with mildly reduced ejection fraction (45-50%) status post CardioMEMS. Cardio Mems was interrogated by 6C nurse who reported 28/9. Spoke to cardiology who eported this is an okay volume status and does not indicate concern.  -Continue PTA Bumex,  potassium, ASA  - Continue PTA losartan, metoprolol  -Cardiology recommends: - IV hydralazine PRN for sys > 160  -Daily weights        ##Chronic back pain  ##Headaches  -Continue prior to admission Lyrica, Tylenol scheduled, Tramadol as needed          Diet: Moderate Consistent Carb (60 g CHO per Meal) Diet    DVT Prophylaxis: Low Risk/Ambulatory with no VTE prophylaxis indicated  Gutierrez Catheter: Not present  Central Lines: None  Cardiac Monitoring: ACTIVE order. Indication: monitor for arhythmia  Code Status: Full Code      Disposition Plan      Expected Discharge Date: 07/15/2022      Destination: nursing home          The patient's care was discussed with the Attending Physician, Dr. Koenig, Bedside Nurse, Care Coordinator/ and Patient.    CHANTAL Oscar CNP    ______________________________________________________________________    Interval History   No events overnight     Data reviewed today: I reviewed all medications, new labs and imaging results over the last 24 hours.   Physical Exam   Vital Signs: Temp: 98.6  F (37  C) Temp src: Axillary BP: (!) 152/68 Pulse: 57   Resp: 14 SpO2: 92 % O2 Device: None (Room air) Oxygen Delivery: 1 LPM  Weight: 303 lbs 0 oz  Constitutional: Pt is alert and oriented to self only. Difficulty word finding and answering questions. .Pt appears well-developed and well-nourished.   HENT:   Head: Normocephalic and atraumatic.   Eyes: Conjunctivae are normal. Pupils are equal, round, and reactive to light.   Neck: Normal range of motion. Neck supple.   Cardiovascular: Normal rate, regular rhythm, normal heart sounds and intact distal pulses.    Pulmonary/Chest: Effort normal and breath sounds normal. No respiratory distress. Pt has no wheezes. Pt has no rales  Abdominal: Soft. Bowel sounds are normal. Pt exhibits no distension and no mass. No tenderness. Pt has no rebound and no guarding.   Musculoskeletal: Normal range of motion. Pt exhibits no edema.    Neurological: Pt is alert and oriented to self only.  Skin: Skin is warm and dry. No rash noted.   Psychiatric: Pt has flat affect. Answering questions slowly but appropriately   Data   Recent Labs   Lab 07/15/22  0808 07/15/22  0156 07/14/22  2224 07/14/22  1832 07/14/22  1537 07/13/22  0855 07/13/22  0619 07/12/22  1819 07/12/22  1423   WBC  --   --   --   --  9.6  --  9.4  --  7.7   HGB  --   --   --   --  14.0  --  14.9  --  13.5   MCV  --   --   --   --  91  --  91  --  90   PLT  --   --   --   --  129*  --  142*  --  149*   NA  --   --   --   --  142  --  144  --  145   POTASSIUM  --   --   --   --  3.8  --  3.4  --  3.7   CHLORIDE  --   --   --   --  104  --  107  --  107   CO2  --   --   --   --  27  --  21*  --  25   BUN  --   --   --   --  30.0*  --  24.3*  --  31.1*   CR  --   --   --   --  1.50*  --  0.98*  --  1.25*   ANIONGAP  --   --   --   --  11  --  16*  --  13   ANTOINETTE  --   --   --   --  9.7  --  10.2  --  10.3*   * 221* 250*   < > 177*   < > 185*   < > 157*   ALBUMIN  --   --   --   --   --   --   --   --  4.0   PROTTOTAL  --   --   --   --   --   --   --   --  6.4   BILITOTAL  --   --   --   --   --   --   --   --  0.4   ALKPHOS  --   --   --   --   --   --   --   --  95   ALT  --   --   --   --   --   --   --   --  17   AST  --   --   --   --   --   --   --   --  20   LIPASE  --   --   --   --   --   --   --   --  70*    < > = values in this interval not displayed.     No results found for this or any previous visit (from the past 24 hour(s)).  Medications       acetaminophen  975 mg Oral Q8H     atorvastatin  40 mg Oral Daily     bumetanide  4 mg Oral Daily     DULoxetine  30 mg Oral BID     ferrous gluconate  324 mg Oral Every Other Day     folic acid  2 mg Oral Daily     insulin aspart  1-7 Units Subcutaneous TID AC     insulin aspart  1-5 Units Subcutaneous At Bedtime     insulin glargine  29 Units Subcutaneous Daily     levothyroxine  150 mcg Oral Daily     losartan  25 mg Oral  Daily     [Held by provider] metoprolol succinate ER  25 mg Oral Daily     metoprolol succinate ER  12.5 mg Oral Daily     potassium chloride ER  20 mEq Oral BID     pregabalin  100 mg Oral BID     repaglinide  1 mg Oral TID AC     senna-docusate  1 tablet Oral BID    Or     senna-docusate  2 tablet Oral BID     sodium chloride (PF)  3 mL Intracatheter Q8H     tolterodine ER  4 mg Oral Daily

## 2022-07-15 NOTE — PLAN OF CARE
Outpatient/Observation goals to be met before discharge home:  - Diagnostic tests and consults completed and resulted - Not met  - Vital signs normal or at patient baseline - Not met. Hypertensive.  - Tolerating oral intake to maintain hydration - Met  - Returns to baseline functional status - Not met  - Safe disposition plan has been identified - Not met

## 2022-07-15 NOTE — PROGRESS NOTES
Care Management Follow Up    Length of Stay (days): 0    Expected Discharge Date: 07/17/2022     Concerns to be Addressed: discharge planning     Patient plan of care discussed at interdisciplinary rounds: No    Anticipated Discharge Disposition: Skilled Nursing Facility     Anticipated Discharge Services: None  Anticipated Discharge DME: None    Patient/family educated on Medicare website which has current facility and service quality ratings: yes  Education Provided on the Discharge Plan:    Patient/Family in Agreement with the Plan: no    Referrals Placed by CM/SW: Post Acute Facilities    Referrals have been made to the following facilities and their status is as follows:     [facility]  P: [phone]  P: [phone] - Admissions  F: [phone]  - Writer left VM with SNF admissions.      [facility] - Accepted  [address] [place]  [address]  P: [phone]  P: [phone] - Admissions / Weekend Admissions  F: [phone]  - Writer spoke with Odilia in admissions. SNF will review.  - Writer spoke with Verna. SNF can clinically accept pt for Sunday. SW to f/u with SNF on Sat to discuss discharge arrangements for Sunday.    The following facilities have either declined pt or lack bed availability:     [facility]  [address]  [address]  P: [phone]  P: [phone] - Admissions  F: [phone]  - Writer spoke with Lori in admissions. SNF does not have an appropriate elise bed for pt. Writer has ceased following this referral.    [facility]  [address] [place]  [address]  P: [phone]  P: [phone] - Admissions  F: [phone]  - Writer spoke with Toya in admissions. SNF is reviewing referral.   - Writer spoke with Toya in admissions. SNF does not have an appropriate bed for pt. Writer has ceased following this referral.    [facility]  P: [phone]  F: [phone]  - Writer left VM with  SNF admissions.   - Writer received VM from Brian that SNF is unable to meet pt's needs. Writer has ceased following this referral.      Private pay costs discussed: Not applicable    Additional Information:  Chart reviewed. Case discussed with bedside RN and medical provider. Pt can discharge to SNF once placement secured.    Writer updated Odalis and pt on acceptance to [facility]. Both are agreeable to discharge to [facility]. Discussed the likely need to medical transportation and how there would be a private pay cost. SW to f/u with Odalis tomorrow to discuss.    ________________    NAKITA Fernandes, [facility]  ED/Observation   [facility]  Phone: [phone]  Pager: [phone]  Fax: [phone]    On-call pager, [phone], 4:00pm to midnight

## 2022-07-16 ENCOUNTER — LAB REQUISITION (OUTPATIENT)
Dept: LAB | Facility: CLINIC | Age: 77
End: 2022-07-16
Payer: MEDICARE

## 2022-07-16 VITALS
BODY MASS INDEX: 46.76 KG/M2 | WEIGHT: 293 LBS | HEART RATE: 89 BPM | RESPIRATION RATE: 12 BRPM | SYSTOLIC BLOOD PRESSURE: 143 MMHG | TEMPERATURE: 98.5 F | DIASTOLIC BLOOD PRESSURE: 79 MMHG | OXYGEN SATURATION: 96 %

## 2022-07-16 DIAGNOSIS — Z20.822 CONTACT WITH AND (SUSPECTED) EXPOSURE TO COVID-19: ICD-10-CM

## 2022-07-16 PROBLEM — R29.6 RECURRENT FALLS: Status: ACTIVE | Noted: 2022-07-16

## 2022-07-16 LAB
ALBUMIN SERPL BCG-MCNC: 3.5 G/DL (ref 3.5–5.2)
ALP SERPL-CCNC: 88 U/L (ref 35–104)
ALT SERPL W P-5'-P-CCNC: 11 U/L (ref 10–35)
ANION GAP SERPL CALCULATED.3IONS-SCNC: 12 MMOL/L (ref 7–15)
AST SERPL W P-5'-P-CCNC: 26 U/L (ref 10–35)
BILIRUB SERPL-MCNC: 0.6 MG/DL
BUN SERPL-MCNC: 27.2 MG/DL (ref 8–23)
CALCIUM SERPL-MCNC: 9.9 MG/DL (ref 8.8–10.2)
CHLORIDE SERPL-SCNC: 106 MMOL/L (ref 98–107)
CREAT SERPL-MCNC: 1.17 MG/DL (ref 0.51–0.95)
DEPRECATED HCO3 PLAS-SCNC: 24 MMOL/L (ref 22–29)
ERYTHROCYTE [DISTWIDTH] IN BLOOD BY AUTOMATED COUNT: 13.2 % (ref 10–15)
GFR SERPL CREATININE-BSD FRML MDRD: 48 ML/MIN/1.73M2
GLUCOSE SERPL-MCNC: 156 MG/DL (ref 70–99)
HCT VFR BLD AUTO: 43.3 % (ref 35–47)
HGB BLD-MCNC: 14.3 G/DL (ref 11.7–15.7)
MCH RBC QN AUTO: 29.5 PG (ref 26.5–33)
MCHC RBC AUTO-ENTMCNC: 33 G/DL (ref 31.5–36.5)
MCV RBC AUTO: 90 FL (ref 78–100)
PLATELET # BLD AUTO: 142 10E3/UL (ref 150–450)
POTASSIUM SERPL-SCNC: 3.7 MMOL/L (ref 3.4–5.3)
PROT SERPL-MCNC: 6.5 G/DL (ref 6.4–8.3)
RBC # BLD AUTO: 4.84 10E6/UL (ref 3.8–5.2)
SODIUM SERPL-SCNC: 142 MMOL/L (ref 136–145)
WBC # BLD AUTO: 9.4 10E3/UL (ref 4–11)

## 2022-07-16 PROCEDURE — 96372 THER/PROPH/DIAG INJ SC/IM: CPT

## 2022-07-16 PROCEDURE — 250N000011 HC RX IP 250 OP 636: Performed by: NURSE PRACTITIONER

## 2022-07-16 PROCEDURE — 250N000011 HC RX IP 250 OP 636

## 2022-07-16 PROCEDURE — 80053 COMPREHEN METABOLIC PANEL: CPT | Performed by: NURSE PRACTITIONER

## 2022-07-16 PROCEDURE — 96376 TX/PRO/DX INJ SAME DRUG ADON: CPT

## 2022-07-16 PROCEDURE — 250N000013 HC RX MED GY IP 250 OP 250 PS 637: Performed by: PHYSICIAN ASSISTANT

## 2022-07-16 PROCEDURE — G0378 HOSPITAL OBSERVATION PER HR: HCPCS

## 2022-07-16 PROCEDURE — U0003 INFECTIOUS AGENT DETECTION BY NUCLEIC ACID (DNA OR RNA); SEVERE ACUTE RESPIRATORY SYNDROME CORONAVIRUS 2 (SARS-COV-2) (CORONAVIRUS DISEASE [COVID-19]), AMPLIFIED PROBE TECHNIQUE, MAKING USE OF HIGH THROUGHPUT TECHNOLOGIES AS DESCRIBED BY CMS-2020-01-R: HCPCS | Performed by: INTERNAL MEDICINE

## 2022-07-16 PROCEDURE — 36415 COLL VENOUS BLD VENIPUNCTURE: CPT | Performed by: NURSE PRACTITIONER

## 2022-07-16 PROCEDURE — 250N000013 HC RX MED GY IP 250 OP 250 PS 637: Performed by: NURSE PRACTITIONER

## 2022-07-16 PROCEDURE — 85027 COMPLETE CBC AUTOMATED: CPT | Performed by: NURSE PRACTITIONER

## 2022-07-16 RX ORDER — HYDRALAZINE HYDROCHLORIDE 10 MG/1
10 TABLET, FILM COATED ORAL EVERY 6 HOURS PRN
DISCHARGE
Start: 2022-07-16 | End: 2022-08-15

## 2022-07-16 RX ORDER — HYDRALAZINE HYDROCHLORIDE 20 MG/ML
5 INJECTION INTRAMUSCULAR; INTRAVENOUS ONCE
Status: COMPLETED | OUTPATIENT
Start: 2022-07-16 | End: 2022-07-16

## 2022-07-16 RX ORDER — INSULIN GLARGINE 100 [IU]/ML
29 INJECTION, SOLUTION SUBCUTANEOUS DAILY
Qty: 15 ML | DISCHARGE
Start: 2022-07-16 | End: 2022-08-04

## 2022-07-16 RX ORDER — ACETAMINOPHEN 325 MG/1
975 TABLET ORAL EVERY 8 HOURS
DISCHARGE
Start: 2022-07-16 | End: 2022-12-02 | Stop reason: DRUGHIGH

## 2022-07-16 RX ORDER — HYDRALAZINE HYDROCHLORIDE 20 MG/ML
10 INJECTION INTRAMUSCULAR; INTRAVENOUS EVERY 6 HOURS PRN
DISCHARGE
Start: 2022-07-16 | End: 2022-07-16

## 2022-07-16 RX ORDER — HYDRALAZINE HYDROCHLORIDE 20 MG/ML
5 INJECTION INTRAMUSCULAR; INTRAVENOUS ONCE
Status: DISCONTINUED | OUTPATIENT
Start: 2022-07-16 | End: 2022-07-16

## 2022-07-16 RX ADMIN — BUMETANIDE 4 MG: 2 TABLET ORAL at 09:54

## 2022-07-16 RX ADMIN — REPAGLINIDE 1 MG: 1 TABLET ORAL at 11:46

## 2022-07-16 RX ADMIN — PREGABALIN 100 MG: 100 CAPSULE ORAL at 09:58

## 2022-07-16 RX ADMIN — ACETAMINOPHEN 975 MG: 325 TABLET, FILM COATED ORAL at 13:04

## 2022-07-16 RX ADMIN — DULOXETINE 30 MG: 30 CAPSULE, DELAYED RELEASE ORAL at 09:56

## 2022-07-16 RX ADMIN — HYDRALAZINE HYDROCHLORIDE 10 MG: 20 INJECTION, SOLUTION INTRAMUSCULAR; INTRAVENOUS at 09:49

## 2022-07-16 RX ADMIN — SENNOSIDES AND DOCUSATE SODIUM 1 TABLET: 8.6; 5 TABLET ORAL at 09:57

## 2022-07-16 RX ADMIN — REPAGLINIDE 1 MG: 1 TABLET ORAL at 09:58

## 2022-07-16 RX ADMIN — HYDRALAZINE HYDROCHLORIDE 5 MG: 20 INJECTION, SOLUTION INTRAMUSCULAR; INTRAVENOUS at 11:46

## 2022-07-16 RX ADMIN — ATORVASTATIN CALCIUM 40 MG: 40 TABLET, FILM COATED ORAL at 09:56

## 2022-07-16 RX ADMIN — LEVOTHYROXINE SODIUM 150 MCG: 0.05 TABLET ORAL at 09:57

## 2022-07-16 RX ADMIN — TOLTERODINE 4 MG: 4 CAPSULE, EXTENDED RELEASE ORAL at 09:59

## 2022-07-16 RX ADMIN — METOPROLOL SUCCINATE 12.5 MG: 25 TABLET, FILM COATED, EXTENDED RELEASE ORAL at 09:51

## 2022-07-16 RX ADMIN — POTASSIUM CHLORIDE 20 MEQ: 750 TABLET, EXTENDED RELEASE ORAL at 09:57

## 2022-07-16 RX ADMIN — LOSARTAN POTASSIUM 25 MG: 25 TABLET, FILM COATED ORAL at 10:02

## 2022-07-16 RX ADMIN — FOLIC ACID 2 MG: 1 TABLET ORAL at 09:56

## 2022-07-16 NOTE — PROGRESS NOTES
Goal Outcome Evaluation:         -diagnostic tests and consults completed and resulted: Met   -vital signs normal or at patient baseline: Met   -tolerating oral intake to maintain hydration: Met   -returns to baseline functional status: Not met    -safe disposition plan has been identified: Met

## 2022-07-16 NOTE — PLAN OF CARE
Goal Outcome Evaluation:    -diagnostic tests and consults completed and resulted: Met   -vital signs normal or at patient baseline: Met   -tolerating oral intake to maintain hydration: Met   -returns to baseline functional status: Not met    -safe disposition plan has been identified: Not met

## 2022-07-16 NOTE — PROGRESS NOTES
[facility]    Medicine Progress Note - ED Observation   Date of Admission:  7/12/2022    Assessment & Plan            Mariel Ribeiro is a 77 year old female admitted on 7/12/2022. She has a PMHx of coronary atherosclerosis, chronic diastolic heart failure s/p CABG x3, angina at rest, transient cerebral ischemia, bilateral carotid artery disease, s/p coronary angiogram, T2DM, HLD hypothyroidism, thyroid cancer s/p thyroidectomy, osteoarthritis, COPD, myalgia and myositis, mononeuritis, BELEN, morbid obesity, cervicalgia, fatty liver disease (nonalcoholic), fibromyalgia, lymphedema, neuropathy, spinal stenosis of lumbar region, vitamin D deficiency, hepatomegaly, hemoptysis, hypertensive CKD stage 5, elevated C-reactive protein, h/o left breast cancer, arthritis, gout, GERD, and gait instability who presents to the ED after a fall on 7/12.      ##Fall  ##Weakness  77-year-old female who presents After a fall on the morning of 7/12 with increasing confusion and difficulty ambulating over the past couple of weeks.  Patient presents with a roommate who reports that she is generally confused and not able to verbalize thoughts.  Patient has chronic pain of lower back and coccyx. Pt has recently undergone neuropsych testing as well as extensive primary care work-up in an effort to diagnose and treat her increasing fatigue and weakness. She has an appt with Neurology in October. Patient has not taken any of her normal home medications today. UA is negative for infection. Chest XR negative. MRI Brain to rule out stroke is negative for stroke. CT abdomen was ordered today to evaluate LUQ bruise given recent fall, however patient's roommate Odalis reports the bruising is from her insulin injection and patient confirmed as well. CT abdomen therefore cancelled. Patient was seen by PT/OT who recommended TCU. Patient was also evaluated by neuro with recommendations listed below. SW to  continue disposition to TCU coordination.   -PT/OT eval both recommending TCU, SW aware  -SW consult per social work, will discharge on Sunday   -Neuro Consult w/ Recs:              - check RPR negative , B1 pending , B12 384              - keep outpatient neurology appointment (scheduled for 10/12/2022)              - history of sleep apnea, Referral for sleep clinic at discharge     ## CKD : Cr 1.5 today (BL appear 1.3)  -Encourage adequate hydration    ##Troponemia, likely related to HTN emergency  Elevated troponin 45>52>107. On exam this morning, patient initially denied any symptoms of chest pain. She is unable to offer any other details regarding the chest pain.  She is a very poor historian due to her baseline confusion.  Cardiology was consulted. They think elevated troponin is likely secondary to HTN emergency. Recommend trending troponin to peak.  Troponin 45>52>107>104>99  - EKG PRN if change in clinical status      # Severe hypertension   Typically 130s/60s with occasional SBP in 120s or 150s. Per Roommate, patient has had syncopal episodes with higher doses of losartan in the past. Currently on Losartan 25 mg daily. Patient refusing BP check at this time. Will continue to monitor and use hydralazine as needed.   - Continue PTA losartan 25 mg daily   - Continue Metoprolol 12.5 mg XL daily. And 25 mg in the evening  - IV hydralazine PRN for sys > 160     ##T2DM  Last HgbA1c: 6.8 on March 9, 2022. /161  -Glucose checks QID  - Continue Prandin daily .  - NovoLog sliding scale coverage  - Continue prior to admission Lantus 29 units, patient typically takes around 1 PM which is her first meal of the day.     ##HLD: Continue PTA Continue prior to admission statin.     ##HFpEF  ##CAD  ##HTN  Pt follows in [facility]. Patient has longstanding heart failure now with mildly reduced ejection fraction (45-50%) status post CardioMEMS. Cardio Mems was interrogated by 6C nurse who reported 28/9. Spoke to  cardiology who eported this is an okay volume status and does not indicate concern. Patient refusing BP check at this time. Will continue to monitor and use hydralazine as needed.   -Continue PTA Bumex, potassium, ASA  - Continue PTA losartan, metoprolol  -Cardiology recommends: - IV hydralazine PRN for sys > 160  -Daily weights        ##Chronic back pain  ##Headaches  -Continue prior to admission Lyrica, Tylenol scheduled, Tramadol as needed          Diet: Moderate Consistent Carb (60 g CHO per Meal) Diet    DVT Prophylaxis: Pneumatic Compression Devices, Aspirin  Gutierrez Catheter: Not present  Central Lines: None  Cardiac Monitoring: ACTIVE order. Indication: monitor for arhythmia  Code Status: Full Code      Disposition Plan    Pending TCU placement     Expected Discharge Date: 07/17/2022      Destination: nursing home          The patient's care was discussed with the Attending Physician, Dr. Koenig, Bedside Nurse, Care Coordinator/, Patient and Patient's Family.    CHANTAL Mckinney CNP  ED Observation  [facility]  Securely message with the Vocera Web Console (learn more here)  Text page via [facility] Paging/Directory         Clinically Significant Risk Factors Present on Admission                 # Hypertension: home medication list includes antihypertensive(s)          ______________________________________________________________________    Interval History   No events overnight.     Data reviewed today: I reviewed all medications, new labs and imaging results over the last 24 hours.     Physical Exam   Vital Signs: Temp: 98.5  F (36.9  C) Temp src: Oral BP: (!) 172/58 Pulse: 93   Resp: 16 SpO2: 99 % O2 Device: None (Room air)    Weight: 303 lbs 0 oz    Constitutional: Pt is alert and oriented to self only. Difficulty word finding and answering questions. .Pt appears well-developed and well-nourished.   HENT:   Head: Normocephalic and  atraumatic.   Eyes: Conjunctivae are normal. Pupils are equal, round, and reactive to light.   Neck: Normal range of motion. Neck supple.   Cardiovascular: Normal rate, regular rhythm, normal heart sounds and intact distal pulses.    Pulmonary/Chest: Effort normal and breath sounds normal. No respiratory distress. Pt has no wheezes. Pt has no rales  Abdominal: Soft. Bowel sounds are normal. Pt exhibits no distension and no mass. No tenderness. Pt has no rebound and no guarding.   Musculoskeletal: Normal range of motion. Pt exhibits no edema.   Neurological: Pt is alert and oriented to self only.  Skin: Skin is warm and dry. No rash noted.   Psychiatric: Pt has flat affect. Answering questions slowly but appropriately        Data   Recent Labs   Lab 07/15/22  2211 07/15/22  1759 07/15/22  0808 07/14/22  1832 07/14/22  1537 07/13/22  0855 07/13/22  0619 07/12/22  1819 07/12/22  1423   WBC  --   --   --   --  9.6  --  9.4  --  7.7   HGB  --   --   --   --  14.0  --  14.9  --  13.5   MCV  --   --   --   --  91  --  91  --  90   PLT  --   --   --   --  129*  --  142*  --  149*   NA  --   --   --   --  142  --  144  --  145   POTASSIUM  --   --   --   --  3.8  --  3.4  --  3.7   CHLORIDE  --   --   --   --  104  --  107  --  107   CO2  --   --   --   --  27  --  21*  --  25   BUN  --   --   --   --  30.0*  --  24.3*  --  31.1*   CR  --   --   --   --  1.50*  --  0.98*  --  1.25*   ANIONGAP  --   --   --   --  11  --  16*  --  13   ANTOINETTE  --   --   --   --  9.7  --  10.2  --  10.3*   * 120* 161*   < > 177*   < > 185*   < > 157*   ALBUMIN  --   --   --   --   --   --   --   --  4.0   PROTTOTAL  --   --   --   --   --   --   --   --  6.4   BILITOTAL  --   --   --   --   --   --   --   --  0.4   ALKPHOS  --   --   --   --   --   --   --   --  95   ALT  --   --   --   --   --   --   --   --  17   AST  --   --   --   --   --   --   --   --  20   LIPASE  --   --   --   --   --   --   --   --  70*    < > = values in this  interval not displayed.     No results found for this or any previous visit (from the past 24 hour(s)).  Medications       acetaminophen  975 mg Oral Q8H     atorvastatin  40 mg Oral Daily     bumetanide  4 mg Oral Daily     DULoxetine  30 mg Oral BID     ferrous gluconate  324 mg Oral Every Other Day     folic acid  2 mg Oral Daily     insulin aspart  1-7 Units Subcutaneous TID AC     insulin aspart  1-5 Units Subcutaneous At Bedtime     insulin glargine  29 Units Subcutaneous Daily     levothyroxine  150 mcg Oral Daily     losartan  25 mg Oral Daily     metoprolol succinate ER  25 mg Oral Daily     metoprolol succinate ER  12.5 mg Oral Daily     potassium chloride ER  20 mEq Oral BID     pregabalin  100 mg Oral BID     repaglinide  1 mg Oral TID AC     senna-docusate  1 tablet Oral BID    Or     senna-docusate  2 tablet Oral BID     sodium chloride (PF)  3 mL Intracatheter Q8H     tolterodine ER  4 mg Oral Daily      Statement Selected

## 2022-07-16 NOTE — PLAN OF CARE
Goal Outcome Evaluation:        Goal Outcome Evaluation:         -diagnostic tests and consults completed and resulted: Met   -vital signs normal or at patient baseline: Met   -tolerating oral intake to maintain hydration: Met   -returns to baseline functional status: Not met    -safe disposition plan has been identified: Not met   BP (!) 161/74 (BP Location: Right arm)   Pulse 78   Temp 98.5  F (36.9  C) (Oral)   Resp 16   Wt 137.4 kg (303 lb)   SpO2 99%   BMI 46.76 kg/m    Refused BS check at 0200

## 2022-07-16 NOTE — DISCHARGE SUMMARY
[facility]  Ed Observation Discharge Summary      Date of Admission:  7/12/2022  Date of Discharge:  7/16/2022  Discharging Provider: CHANTAL Mckinney CNP  Discharge Service: ED Observation   Discharge Diagnoses     Generalized Weakness  Mechanical Falls    Follow-ups Needed After Discharge   Follow-up Appointments     Follow Up and recommended labs and tests      Follow up with Nursing home physician.  No follow up labs or test are   needed.    We recommend you follow up with your neurology appointment on 10/12/2022.    We recommend you follow up with a sleep study upon discharge from TCU. A   referral has been placed             Unresulted Labs Ordered in the Past 30 Days of this Admission     Date and Time Order Name Status Description    7/14/2022  4:10 PM Vitamin B1 whole blood In process       These results will be followed up by OBS LIYAH    Discharge Disposition   Discharged to rehabilitation facility  Condition at discharge: Stable      Hospital Course   Mariel Ribeiro is a 77 year old female admitted on 7/12/2022. She has a PMHx of coronary atherosclerosis, chronic diastolic heart failure s/p CABG x3, angina at rest, transient cerebral ischemia, bilateral carotid artery disease, s/p coronary angiogram, T2DM, HLD hypothyroidism, thyroid cancer s/p thyroidectomy, osteoarthritis, COPD, myalgia and myositis, mononeuritis, BELEN, morbid obesity, cervicalgia, fatty liver disease (nonalcoholic), fibromyalgia, lymphedema, neuropathy, spinal stenosis of lumbar region, vitamin D deficiency, hepatomegaly, hemoptysis, hypertensive CKD stage 5, elevated C-reactive protein, h/o left breast cancer, arthritis, gout, GERD, and gait instability who presents to the ED after a fall on 7/12.      ##Fall  ##Weakness  77-year-old female who presents After a fall on the morning of 7/12 with increasing confusion and difficulty ambulating over the past couple of weeks.   Patient presents with a roommate who reports that she is generally confused and not able to verbalize thoughts.  Patient has chronic pain of lower back and coccyx. Pt has recently undergone neuropsych testing as well as extensive primary care work-up in an effort to diagnose and treat her increasing fatigue and weakness. She has an appt with Neurology in October. Patient has not taken any of her normal home medications today. UA is negative for infection. Chest XR negative. MRI Brain to rule out stroke is negative for stroke. CT abdomen was ordered today to evaluate LUQ bruise given recent fall, however patient's roommate Odalis reports the bruising is from her insulin injection and patient confirmed as well. CT abdomen therefore cancelled. Patient was seen by PT/OT who recommended TCU. Patient discharge today to [facility] in stable condition. Patient and roommate both verbalizes understanding of discharge plan and needs for follow up. Patient stable at discharge declining pain. Patient was also evaluated by neuro with recommendations listed below.   -Neuro Recs:              - check RPR negative , B1 pending , B12 384              - keep outpatient neurology appointment (scheduled for 10/12/2022)              - history of sleep apnea, Referral for sleep clinic at discharge (ordered at discharge)     ## CKD : Cr 1.5 today (BL appear 1.3)  -Encourage adequate hydration     ##Troponemia, likely related to HTN emergency  Elevated troponin 45>52>107. On exam this morning, patient initially denied any symptoms of chest pain. She is unable to offer any other details regarding the chest pain.  She is a very poor historian due to her baseline confusion.  Cardiology was consulted. They think elevated troponin is likely secondary to HTN emergency. Recommend trending troponin to peak.  Troponin 45>52>107>104>99  - EKG PRN if change in clinical status      # Severe hypertension   Typically 130s/60s with occasional SBP  in 120s or 150s. Per Roommate, patient has had syncopal episodes with higher doses of losartan in the past. Currently on Losartan 25 mg daily. Patient refusing BP check at this time. Will continue to monitor and use hydralazine as needed.   - Continue PTA losartan 25 mg daily   - Continue Metoprolol 12.5 mg XL daily. And 25 mg in the evening  - IV hydralazine PRN for sys > 160     ##T2DM  Last HgbA1c: 6.8 on March 9, 2022. /161  -Glucose checks QID  - Continue Prandin daily .  - Continue prior to admission Lantus 29 units, patient typically takes around 1 PM which is her first meal of the day.     ##HLD: Continue PTA Continue prior to admission statin.     ##HFpEF  ##CAD  ##HTN  Pt follows in [facility] clinic. Patient has longstanding heart failure now with mildly reduced ejection fraction (45-50%) status post CardioMEMS. Cardio Mems was interrogated by 6C nurse who reported 28/9. Spoke to cardiology who eported this is an okay volume status and does not indicate concern. Patient refusing BP check at this time. Will continue to monitor and use hydralazine as needed.   -Continue PTA Bumex, potassium, ASA  - Continue PTA losartan, metoprolol  -Cardiology recommends: - IV hydralazine PRN for sys > 160  -Daily weights        ##Chronic back pain  ##Headaches  -Continue prior to admission Lyrica, Tylenol scheduled    Consultations This Hospital Stay   NURSING TO CONSULT FOR VASCULAR ACCESS CARE IP CONSULT  CARE MANAGEMENT / SOCIAL WORK IP CONSULT  PHYSICAL THERAPY ADULT IP CONSULT  OCCUPATIONAL THERAPY ADULT IP CONSULT  NURSING TO CONSULT FOR VASCULAR ACCESS CARE IP CONSULT  CARDIOLOGY GENERAL ADULT IP CONSULT  NEUROLOGY GENERAL ADULT IP CONSULT  PHYSICAL THERAPY ADULT IP CONSULT  OCCUPATIONAL THERAPY ADULT IP CONSULT    Code Status   Full Code    CHANTAL Mckinney CNP  [facility] [facility]  [address] [place] [id number]  Phone: [phone]  Fax:  [phone]  ______________________________________________________________________    Physical Exam   Vital Signs: Temp: 98.5  F (36.9  C) Temp src: Oral BP: (!) 172/58 Pulse: 93   Resp: 16 SpO2: 99 % O2 Device: None (Room air)    Weight: 303 lbs 0 oz    Constitutional: Pt is alert and oriented to self only. Difficulty word finding and answering questions. .Pt appears well-developed and well-nourished.   HENT:   Head: Normocephalic and atraumatic.   Eyes: Conjunctivae are normal. Pupils are equal, round, and reactive to light.   Neck: Normal range of motion. Neck supple.   Cardiovascular: Normal rate, regular rhythm, normal heart sounds and intact distal pulses.    Pulmonary/Chest: Effort normal and breath sounds normal. No respiratory distress. Pt has no wheezes. Pt has no rales  Abdominal: Soft. Bowel sounds are normal. Pt exhibits no distension and no mass. No tenderness. Pt has no rebound and no guarding.   Musculoskeletal: Normal range of motion. Pt exhibits no edema.   Neurological: Pt is alert and oriented to self only.  Skin: Skin is warm and dry. No rash noted.   Psychiatric: Pt has flat affect. Answering questions slowly but appropriately       Primary Care Physician   Malika Wolf    Discharge Orders      Sleep Study Referral      General info for SNF    Length of Stay Estimate: Short Term Care: Estimated # of Days <30  Condition at Discharge: Stable  Level of care:skilled   Rehabilitation Potential: Good  Admission H&P remains valid and up-to-date: Yes  Recent Chemotherapy: N/A  Use Nursing Home Standing Orders: Yes     Mantoux instructions    Give two-step Mantoux (PPD) Per Facility Policy Yes     Follow Up and recommended labs and tests    Follow up with Nursing home physician.  No follow up labs or test are needed.    We recommend you follow up with your neurology appointment on 10/12/2022.    We recommend you follow up with a sleep study upon discharge from TCU. A referral has been placed      Intake and output    Every shift     Glucose monitor nursing POCT    Before meals and at bedtime     Activity - Up with nursing assistance     Activity - Up with assistive device     Reason for your hospital stay    You were admitted to ED observation for gait instability and a fall. In the ED, your UA was negative. Chest x-ray negative. MRI brain negative for stroke. Hari underwent extensive neuropsych testing through her primary care team. Our neurology team evaluated you here and recommended you keep your outpatient appointment with neurology on 10/12 and follow up with a sleep study. A referral has been placed for this. You B12 and RPR were normal. You were evaluated by PT who recommended TCU at discharge. Your troponin was elevated while here but cardiology was consulted regarding this and they recommended that given your absence of symptoms and EKG, we trend the troponin to peak, and this trended downward. No further work up was recommended by cardiology. We recommend you continue your blood pressure medications.Continue other prior to admission medications. We recommend you continue to take Tylenol to manage your pain.     Full Code     Physical Therapy Adult Consult    Evaluate and treat as clinically indicated.    Reason:  Evaluation and Treatment     Occupational Therapy Adult Consult    Evaluate and treat as clinically indicated.    Reason:  Evaluation and Treatment     Fall precautions     Pneumatic Compression Device     Bilateral calf. Remove 30 mins BID.     Diet    Follow this diet upon discharge: Regular       Significant Results and Procedures   Most Recent 3 CBC's:Recent Labs   Lab Test 07/14/22  1537 07/13/22  0619 07/12/22  1423   WBC 9.6 9.4 7.7   HGB 14.0 14.9 13.5   MCV 91 91 90   * 142* 149*     Most Recent 3 BMP's:Recent Labs   Lab Test 07/15/22  2211 07/15/22  1759 07/15/22  0808 07/14/22  1832 07/14/22  1537 07/13/22  0855 07/13/22  0619 07/12/22  1819 07/12/22  1423   NA  --    --   --   --  142  --  144  --  145   POTASSIUM  --   --   --   --  3.8  --  3.4  --  3.7   CHLORIDE  --   --   --   --  104  --  107  --  107   CO2  --   --   --   --  27  --  21*  --  25   BUN  --   --   --   --  30.0*  --  24.3*  --  31.1*   CR  --   --   --   --  1.50*  --  0.98*  --  1.25*   ANIONGAP  --   --   --   --  11  --  16*  --  13   ANTOINETTE  --   --   --   --  9.7  --  10.2  --  10.3*   * 120* 161*   < > 177*   < > 185*   < > 157*    < > = values in this interval not displayed.     Most Recent 2 LFT's:Recent Labs   Lab Test 07/12/22  1423 03/09/22  1304   AST 20 19   ALT 17 24   ALKPHOS 95 109   BILITOTAL 0.4 0.5     Most Recent 3 INR's:Recent Labs   Lab Test 05/12/20  1420 11/11/19  1851 07/06/18  0904   INR 1.01 1.04 1.09     Most Recent 3 Creatinines:Recent Labs   Lab Test 07/14/22  1537 07/13/22  0619 07/12/22  1423   CR 1.50* 0.98* 1.25*     Most Recent 3 Hemoglobins:Recent Labs   Lab Test 07/14/22  1537 07/13/22  0619 07/12/22  1423   HGB 14.0 14.9 13.5     Most Recent 3 Troponin's:Recent Labs   Lab Test 07/09/21  1700 05/12/20  1420 11/11/19  1851 05/17/17  1400 12/19/15  1900 12/19/15  1851 05/04/15  1536   TROPI <0.015 <0.015 <0.015   < >  --    < >  --    TROPONIN  --   --   --   --  0.01  --  0.00    < > = values in this interval not displayed.   ,   Results for orders placed or performed during the hospital encounter of 07/12/22   CT Head w/o Contrast    Narrative    CT HEAD W/O CONTRAST 7/12/2022 12:02 PM    History: Acute confusion, difficulty ambulating, falling     Comparison: 1/25/2022    Technique: Using multidetector thin collimation helical acquisition  technique, axial, coronal and sagittal CT images from the skull base  to the vertex were obtained without intravenous contrast.   (topogram) image(s) also obtained and reviewed.    Findings: There is no intracranial hemorrhage, mass effect, or midline  shift. Gray/white matter differentiation in both cerebral  hemispheres  is preserved. Ventricles are proportionate to the cerebral sulci. The  basal cisterns are clear. Confluent patchy hypoattenuation of the  cerebral hemispheric white matter similar to prior exam, likely  sequela of chronic small vessel ischemic disease.    The bony calvaria and the bones of the skull base are normal. The  visualized portions of the paranasal sinuses and mastoid air cells are  clear.       Impression    Impression: No acute intracranial pathology.    I have personally reviewed the examination and initial interpretation  and I agree with the findings.    FRANCISCO NASH MD         SYSTEM ID:  [id number]   MR Brain w/o Contrast    Narrative    MRI brain without contrast    Provided History:  Word finding difficulty, worsening balance.    Comparison:  Brain MRI 10/25/2018, head CT 7/12/2022      Technique:   Sagittal T1, axial diffusion-weighted, axial T2 fat sat, axial FLAIR  fat sat, axial susceptibility weighted, and coronal T2 fat sat  weighted images obtained without contrast.    Findings:   Diffusion-weighted images demonstrate no acute infarct. Mild  leukoaraiosis, slightly progressed since 2018. Few similar scattered  foci of punctate susceptibility artifact, for example in the right  posterior corona radiata, consistent with old microhemorrhage. No  evidence for acute intracranial hemorrhage. No mass effect or midline  shift. Mild cerebral atrophy, normal for age. No hydrocephalus. The  major intracranial arterial flow voids are intact.    Bilateral pseudophakia. Mucous retention cyst in the left frontal  sinus. Mild mucosal thickening in the ethmoid air cells. Mastoid air  cells are clear.      Impression    Impression:  1. No acute infarct or other acute intracranial findings.  2. Stable mild cerebral atrophy and chronic small vessel ischemic  disease.    I have personally reviewed the examination and initial interpretation  and I agree with the findings.    ELBA CH MD          SYSTEM ID:  [id number]   XR Chest 2 Views    Narrative    EXAM: XR CHEST 2 VW  LOCATION: [facility]  DATE/TIME: 7/12/2022 10:08 PM    INDICATION: Weakness.  COMPARISON: 7/9/2021.    FINDINGS: Sternal wires and mediastinal clips. No pneumothorax. The heart size is normal. The thoracic aorta is calcified. The lungs are clear. No pleural effusion. Degenerative disease in the spine.      Impression    IMPRESSION: No acute abnormality.     *Note: Due to a large number of results and/or encounters for the requested time period, some results have not been displayed. A complete set of results can be found in Results Review.       Discharge Medications   Current Discharge Medication List      START taking these medications    Details   hydrALAZINE (APRESOLINE) 20 MG/ML injection Inject 0.5 mLs (10 mg) into the vein every 6 hours as needed for high blood pressure (Administer for DBP>90 or SBP>160)    Associated Diagnoses: Hypertension goal BP (blood pressure) < 130/80         CONTINUE these medications which have CHANGED    Details   acetaminophen (TYLENOL) 325 MG tablet Take 3 tablets (975 mg) by mouth every 8 hours    Associated Diagnoses: Pain in the coccyx      insulin glargine (LANTUS SOLOSTAR) 100 UNIT/ML pen Inject 29 Units Subcutaneous daily  Qty: 15 mL    Comments: If Lantus is not covered by insurance, may substitute Basaglar or Semglee or other insulin glargine product per insurance preference at same dose and frequency.    Associated Diagnoses: Type 2 diabetes mellitus with stage 3 chronic kidney disease, with long-term current use of insulin, unspecified whether stage 3a or 3b CKD (H)         CONTINUE these medications which have NOT CHANGED    Details   albuterol (PROAIR HFA/PROVENTIL HFA/VENTOLIN HFA) 108 (90 Base) MCG/ACT inhaler INHALE TWO PUFFS INTO LUNGS EVERY SIX HOURS  Qty: 25.5 g, Refills: 9    Associated Diagnoses: Simple chronic bronchitis (H)       anastrozole (ARIMIDEX) 1 MG tablet Take 1 tablet (1 mg) by mouth daily  Qty: 90 tablet, Refills: 3    Associated Diagnoses: Ductal carcinoma in situ (DCIS) of left breast      aspirin (ASA) 81 MG EC tablet Take 1 tablet (81 mg) by mouth daily    Associated Diagnoses: (HFpEF) heart failure with preserved ejection fraction (H)      atorvastatin (LIPITOR) 40 MG tablet Take 1 tablet (40 mg) by mouth in the morning.  Qty: 90 tablet, Refills: 3    Associated Diagnoses: Atherosclerosis of native coronary artery without angina pectoris, unspecified whether native or transplanted heart      blood glucose (ONETOUCH ULTRA) test strip Use to test blood sugar four times daily or as directed.  Qty: 3 Box, Refills: 3    Associated Diagnoses: Type 2 diabetes mellitus with stage 3 chronic kidney disease, with long-term current use of insulin (H)      bumetanide (BUMEX) 1 MG tablet 4 mg daily  Qty: 360 tablet, Refills: 3    Associated Diagnoses: Chronic diastolic heart failure (H)      Continuous Blood Gluc  (FREESTYLE MARTY 14 DAY READER) JEZ 1 Units 4 times daily (before meals and nightly)  Qty: 1 Device, Refills: 0    Associated Diagnoses: Type 2 diabetes mellitus with stage 3a chronic kidney disease, with long-term current use of insulin (H)      !! Continuous Blood Gluc Sensor (FREESTYLE MARTY 14 DAY SENSOR) [facility] NEW SENSOR TO BACK OF ARM EVERY 14 DAYS TO MONITOR BLOOD SUGARS  Qty: 2 each, Refills: 4    Associated Diagnoses: Type 2 diabetes mellitus with stage 3a chronic kidney disease, with long-term current use of insulin (H)      !! Continuous Blood Gluc Sensor (FREESTYLE MARTY 14 DAY SENSOR) [facility] new sensor to back of arm q14 days to monitor blood sugars  Qty: 6 each, Refills: 3    Associated Diagnoses: Type 2 diabetes mellitus with stage 3a chronic kidney disease, with long-term current use of insulin (H)      DULoxetine (CYMBALTA) 30 MG capsule Take 1 capsule (30 mg) by mouth 2 times daily  Qty: 180  capsule, Refills: 1    Associated Diagnoses: Recurrent major depressive disorder, in partial remission (H)      EPINEPHrine (ANY BX GENERIC EQUIV) 0.3 MG/0.3ML injection 2-pack Inject 0.3 mLs (0.3 mg) into the muscle once as needed for anaphylaxis  Qty: 0.6 mL, Refills: 3    Associated Diagnoses: Allergic reaction, subsequent encounter      ferrous gluconate (FERGON) 324 (38 Fe) MG tablet TAKE 1 TABLET BY MOUTH EVERY MONDAY, WEDNESDAY, AND FRIDAY MORNING  Qty: 36 tablet, Refills: 2    Associated Diagnoses: Chronic diastolic heart failure (H); Iron deficiency anemia secondary to inadequate dietary iron intake      folic acid (FOLVITE) 1 MG tablet Take 2 tablets (2 mg) by mouth daily    Associated Diagnoses: (HFpEF) heart failure with preserved ejection fraction (H)      guaiFENesin (MUCINEX) 600 MG 12 hr tablet Take 1 tablet (600 mg) by mouth 2 times daily    Associated Diagnoses: (HFpEF) heart failure with preserved ejection fraction (H)      levothyroxine (SYNTHROID) 150 MCG tablet Take 1 tablet (150 mcg) by mouth daily  Qty: 90 tablet, Refills: 2    Associated Diagnoses: Other specified hypothyroidism      Donews FINEPOINT LANCETS MISC Use to test blood sugars 2 times daily or as directed.  Qty: 100 each, Refills: prn    Associated Diagnoses: Type 2 diabetes mellitus with diabetic polyneuropathy, without long-term current use of insulin (H)      losartan (COZAAR) 50 MG tablet TAKE 1/2 TABLET(25 MG) BY MOUTH DAILY  Qty: 45 tablet, Refills: 0    Associated Diagnoses: Chronic diastolic heart failure (H)      metoprolol succinate ER (TOPROL-XL) 25 MG 24 hr tablet Take 0.5 tablets (12.5 mg) by mouth every morning AND 1 tablet (25 mg) every evening.  Qty: 135 tablet, Refills: 3    Comments: Updating prescription to what patient has been taking. Should use this dose going forward.  Associated Diagnoses: Chronic diastolic heart failure (H)      miconazole (MICATIN/MICRO GUARD) 2 % external powder Apply topically as  "needed for itching or other Redness in bilateral groin and  clef  Qty: 43 g, Refills: 0    Associated Diagnoses: Intertrigo      niacin ER (NIASPAN) 500 MG CR tablet TAKE 1 TABLET(500 MG) BY MOUTH TWICE DAILY  Qty: 180 tablet, Refills: 1    Associated Diagnoses: Hyperlipidemia with target LDL less than 70      nitroGLYcerin (NITROSTAT) 0.4 MG sublingual tablet For chest pain place 1 tablet under the tongue every 5 minutes for 3 doses. If symptoms persist 5 minutes after 1st dose call 911.  Qty: 25 tablet, Refills: 1    Associated Diagnoses: Atherosclerosis of native coronary artery without angina pectoris, unspecified whether native or transplanted heart      nystatin POWD 1 Dose 4 times daily  Qty: 1 each, Refills: 1    Comments: Per pharmacy formulary  Associated Diagnoses: Candidiasis of skin      NYSTOP [id number] UNIT/GM powder 1 Dose      ONE TOUCH ULTRA (DEVICES) MISC test blood sugar BID  Qty: 1, Refills: 0    Associated Diagnoses: Type II or unspecified type diabetes mellitus without mention of complication, not stated as uncontrolled      potassium chloride ER (KLOR-CON M) 10 MEQ CR tablet Take 2 tablets (20 mEq) by mouth 2 times daily  Qty: 360 tablet, Refills: 3    Associated Diagnoses: Chronic diastolic heart failure (H)      pregabalin (LYRICA) 100 MG capsule TAKE 1 CAPSULE(100 MG) BY MOUTH TWICE DAILY  Qty: 180 capsule, Refills: 0    Associated Diagnoses: Pain in joint of left shoulder      repaglinide (PRANDIN) 1 MG tablet Take 1 tablet (1 mg) by mouth 3 times daily (before meals)  Qty: 180 tablet, Refills: 3    Associated Diagnoses: Type 2 diabetes mellitus with stage 3 chronic kidney disease, with long-term current use of insulin (H)      Skin Protectants, Misc. (INTERDRY AG TEXTILE 10\"X144\") SHEE Externally apply 1 Box topically daily Apply to areas of intertrigo prn  Qty: 1 each, Refills: 3    Comments: Please refill this-- not previous rx  Associated Diagnoses: Intertrigo      tolterodine ER " (DETROL LA) 2 MG 24 hr capsule TAKE 2 CAPSULES(4 MG) BY MOUTH DAILY  Qty: 180 capsule, Refills: 0    Comments: LAst refill  Needs to be seen  Associated Diagnoses: Mixed incontinence      traZODone (DESYREL) 50 MG tablet TAKE 3 TABLETS(150 MG) BY MOUTH AT BEDTIME  Qty: 270 tablet, Refills: 1    Associated Diagnoses: Insomnia, unspecified type       !! - Potential duplicate medications found. Please discuss with provider.      STOP taking these medications       traMADol (ULTRAM) 50 MG tablet Comments:   Reason for Stopping:             Allergies   Allergies   Allergen Reactions     Contrast Dye Anaphylaxis     Fish Allergy Anaphylaxis     Iodine Anaphylaxis     Oxycodone Other (See Comments)     Severe suicidal tendencies on this medication     Tree Nuts [Nuts] Anaphylaxis     Tree nuts only (peanuts ok)     Bactrim      Increased uric acid     Betadine [Povidone Iodine] Hives, Swelling and Difficulty breathing     Betadine     Combivent      Rash     Dulaglutide Other (See Comments)     Hx . Of thyroid cancer.     Fish      Lisinopril Other (See Comments)     Scr/grf severely reduced.      Penicillins Rash     Allopurinol Rash     Latex Rash     Wool Fiber Rash

## 2022-07-16 NOTE — PROGRESS NOTES
Patient's After Visit Summary was reviewed with patient and/or N/A.   Patient verbalized understanding of After Visit Summary, recommended follow up and was given an opportunity to ask questions.   Discharge medications sent home with patient/family: Not applicable   Discharged with significant other

## 2022-07-16 NOTE — PROGRESS NOTES
Care Management Discharge Note    Discharge Date: 07/16/2022       Discharge Disposition: Skilled Nursing Facility    [facility]   [address] [place]  [address]  P: [phone]  P: [phone] - Admissions / Weekend Admissions  F: [phone]    Nurse-to-Nurse ([phone]).     Discharge Services: None    Discharge DME: None    Discharge Transportation: agency    Private pay costs discussed: Not applicable    PAS Confirmation Code:  [id number]  Patient/family educated on Medicare website which has current facility and service quality ratings: yes    Education Provided on the Discharge Plan:    Persons Notified of Discharge Plans: Pt, roommate Odalis, facility (Odilia), APRN (Anne) Charge RN (Elizabeth) Carmina RN Solo  Patient/Family in Agreement with the Plan: yes    Handoff Referral Completed: Yes- sent at 1034    Additional Information:    0852 SW received VM from Odilia from [facility] Admissions who reported that they could admit pt today if she was ready.    MARCO spoke with OBS CHANTAL Rodríguez who confirmed pt was medically ready for discharge    0909 SW left VM update with Odilia ([phone]) confirming pt's ability to admit there today.    0910 SW updated pt's roommate Odalis ([phone]) who agreed to the discharge and reported she would be in to see pt by 1130.    0920 MARCO spoke with Odilia who asked for discharge orders by 1100. SW agreed to send them and to update on transportation time.    0947 Discharge orders sent to [facility] via Epic.    0948 SW updated pt about discharge plans    1015 MARCO contacted [facility] [employer] (Ph: [phone]), spoke with Chantal and secured Wheelchair transportation for pt pickup at 1345.    1025 MARCO spoke with Odilia, confirmed that discharge orders were received and provided update on discharge time.    1044 MARCO received VM from Odilia requesting that pt's hydralazine be changed from injection to pill form. Odilia also provided  Nurse-to-Nurse number ([phone]). MARCO then updated APRN who changed medication orders.    1145 MARCO spoke with CHANTAL Rodríguez who reported that pt should be transported via stretcher.     1148 MARCO contacted [facility] (Ph: [phone]), spoke with Nicolas and updated mode of transport to Stretcher  for pt pickup at 1500    1149 MARCO spoke with Odilia who requested clarification on pt's Lyrica. MARCO agreed to fax updated orders. MARCO then updated APRN who made needed changes.    1152 MARCO faxed updated orders to [facility]    1219 MARCO faxed completed Form [id number] (Physician Certification for Ambulance Transportation) to [facility]  Billing (fax: [phone]), and placed original in pt's discharge packet.    1447 MARCO submitted PAS. [id number]    MARCO will continue to follow as needed.    NAKITA Gutierrez, LGSW  ED/OBS   [facility]  Phone: [phone]  Pager: [phone]  Fax: [phone]     On-call pager, [phone], 4:00 pm to midnight

## 2022-07-17 LAB — SARS-COV-2 RNA RESP QL NAA+PROBE: NEGATIVE

## 2022-07-18 ENCOUNTER — PATIENT OUTREACH (OUTPATIENT)
Dept: ONCOLOGY | Facility: CLINIC | Age: 77
End: 2022-07-18

## 2022-07-18 ENCOUNTER — PATIENT OUTREACH (OUTPATIENT)
Dept: CARE COORDINATION | Facility: CLINIC | Age: 77
End: 2022-07-18

## 2022-07-18 DIAGNOSIS — M25.512 PAIN IN JOINT OF LEFT SHOULDER: ICD-10-CM

## 2022-07-18 RX ORDER — PREGABALIN 100 MG/1
100 CAPSULE ORAL 2 TIMES DAILY
Qty: 60 CAPSULE | Refills: 0 | Status: SHIPPED | OUTPATIENT
Start: 2022-07-18 | End: 2022-09-19

## 2022-07-18 ASSESSMENT — ACTIVITIES OF DAILY LIVING (ADL): DEPENDENT_IADLS:: SHOPPING

## 2022-07-18 NOTE — PROGRESS NOTES
Clinic Care Coordination Contact  Care Coordination Transition Communication    Referral Source: Care Team    Clinical Data: Patient was hospitalized at [facility] from 7/12/22 to 7/16/22 with diagnosis of generalized weakness, falls.    Transition to Facility:             [facility]   [address] [place]  [address]  P: [phone]  P: [phone] - Admissions / Weekend Admissions  F: [phone]    Plan: RN/SW Care Coordinator will await notification from facility staff informing RN/SW Care Coordinator of patient's discharge plans/needs. RN/SW Care Coordinator will review chart and outreach to facility staff every 4 weeks and as needed.     Peggy Raines, JODY Care Coordinator     [facility] Ambulatory Care Management  [facility] Family and OB

## 2022-07-18 NOTE — PROGRESS NOTES
Patient discharged on 7/16/22, please follow up per TCM workflow.    Gerry Ibarra on 7/18/2022 at 8:24 AM

## 2022-07-18 NOTE — PROGRESS NOTES
Brief SW Note    Writer received call from Ariella, from [facility] (Ph: [phone]) stating pt triggered a OBRA level 2 for developmental disability. Reviewed the chart with Ariella--no listing of DD in pt's chart. Ariella will update the PAS, no level two will be triggered.    ________________    NAKITA Fernandes, [facility]  ED/Observation   [facility]  Phone: [phone]  Pager: [phone]  Fax: [phone]    On-call pager, [phone], 4:00pm to midnight

## 2022-07-18 NOTE — LETTER
To:   Please give to facility      Please notify me when the following patient is discharged.    Patient Name: Mariel Ribeiro  Date of Birth: [birth year]  Admit date: 7/16/22    From:  Peggy Raines, RN Care Coordinator     [facility] Ambulatory Care Management  [facility] Family and OB  [email or web address]    Office: [phone]

## 2022-07-18 NOTE — PLAN OF CARE
Physical Therapy Discharge Summary    Reason for therapy discharge:    Discharged to transitional care facility.    Progress towards therapy goal(s). See goals on Care Plan in [facility] electronic health record for goal details.  Goals partially met.  Barriers to achieving goals:   discharge from facility.    Therapy recommendation(s):    Continued therapy is recommended.  Rationale/Recommendations:  TCU.

## 2022-07-19 LAB — VIT B1 PYROPHOSHATE BLD-SCNC: 44 NMOL/L

## 2022-07-21 ENCOUNTER — LAB REQUISITION (OUTPATIENT)
Dept: LAB | Facility: CLINIC | Age: 77
End: 2022-07-21

## 2022-07-21 DIAGNOSIS — Z20.822 CONTACT WITH AND (SUSPECTED) EXPOSURE TO COVID-19: ICD-10-CM

## 2022-07-21 LAB — SARS-COV-2 RNA RESP QL NAA+PROBE: NEGATIVE

## 2022-07-21 PROCEDURE — U0003 INFECTIOUS AGENT DETECTION BY NUCLEIC ACID (DNA OR RNA); SEVERE ACUTE RESPIRATORY SYNDROME CORONAVIRUS 2 (SARS-COV-2) (CORONAVIRUS DISEASE [COVID-19]), AMPLIFIED PROBE TECHNIQUE, MAKING USE OF HIGH THROUGHPUT TECHNOLOGIES AS DESCRIBED BY CMS-2020-01-R: HCPCS | Performed by: INTERNAL MEDICINE

## 2022-07-25 ENCOUNTER — TELEPHONE (OUTPATIENT)
Dept: SLEEP MEDICINE | Facility: CLINIC | Age: 77
End: 2022-07-25

## 2022-07-25 NOTE — TELEPHONE ENCOUNTER
Phone call to patient and spoke to Odalis for manual download. Odalis states she has consent to discuss medical appointments. Odalis states patient is at rehab. Odalis also report patient has not been using her machine for 2 years.     Moshe Kent [facility]

## 2022-07-27 ENCOUNTER — TRANSITIONAL CARE UNIT VISIT (OUTPATIENT)
Dept: GERIATRICS | Facility: CLINIC | Age: 77
End: 2022-07-27
Payer: MEDICARE

## 2022-07-27 ENCOUNTER — VIRTUAL VISIT (OUTPATIENT)
Dept: SLEEP MEDICINE | Facility: CLINIC | Age: 77
End: 2022-07-27
Payer: MEDICARE

## 2022-07-27 VITALS — HEIGHT: 66 IN | BODY MASS INDEX: 47.09 KG/M2 | WEIGHT: 293 LBS

## 2022-07-27 VITALS
TEMPERATURE: 97 F | SYSTOLIC BLOOD PRESSURE: 158 MMHG | HEIGHT: 68 IN | DIASTOLIC BLOOD PRESSURE: 66 MMHG | WEIGHT: 293 LBS | BODY MASS INDEX: 44.41 KG/M2 | RESPIRATION RATE: 18 BRPM | HEART RATE: 59 BPM | OXYGEN SATURATION: 94 %

## 2022-07-27 DIAGNOSIS — G47.33 OSA (OBSTRUCTIVE SLEEP APNEA): ICD-10-CM

## 2022-07-27 DIAGNOSIS — M54.9 CHRONIC BACK PAIN, UNSPECIFIED BACK LOCATION, UNSPECIFIED BACK PAIN LATERALITY: ICD-10-CM

## 2022-07-27 DIAGNOSIS — C50.312 MALIGNANT NEOPLASM OF LOWER-INNER QUADRANT OF LEFT BREAST IN FEMALE, ESTROGEN RECEPTOR POSITIVE (H): ICD-10-CM

## 2022-07-27 DIAGNOSIS — J44.9 CHRONIC OBSTRUCTIVE PULMONARY DISEASE, UNSPECIFIED COPD TYPE (H): ICD-10-CM

## 2022-07-27 DIAGNOSIS — N18.30 TYPE 2 DIABETES MELLITUS WITH STAGE 3 CHRONIC KIDNEY DISEASE, WITH LONG-TERM CURRENT USE OF INSULIN, UNSPECIFIED WHETHER STAGE 3A OR 3B CKD (H): ICD-10-CM

## 2022-07-27 DIAGNOSIS — I25.10 CORONARY ARTERY DISEASE INVOLVING NATIVE CORONARY ARTERY OF NATIVE HEART WITHOUT ANGINA PECTORIS: ICD-10-CM

## 2022-07-27 DIAGNOSIS — D50.9 IRON DEFICIENCY ANEMIA, UNSPECIFIED IRON DEFICIENCY ANEMIA TYPE: ICD-10-CM

## 2022-07-27 DIAGNOSIS — E66.01 MORBID OBESITY (H): ICD-10-CM

## 2022-07-27 DIAGNOSIS — E11.22 TYPE 2 DIABETES MELLITUS WITH STAGE 3 CHRONIC KIDNEY DISEASE, WITH LONG-TERM CURRENT USE OF INSULIN, UNSPECIFIED WHETHER STAGE 3A OR 3B CKD (H): ICD-10-CM
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
## 2022-07-27 DIAGNOSIS — G47.33 OBSTRUCTIVE SLEEP APNEA: Primary | ICD-10-CM

## 2022-07-27 DIAGNOSIS — G89.29 CHRONIC BACK PAIN, UNSPECIFIED BACK LOCATION, UNSPECIFIED BACK PAIN LATERALITY: ICD-10-CM

## 2022-07-27 DIAGNOSIS — I12.9 BENIGN HYPERTENSIVE KIDNEY DISEASE WITH CHRONIC KIDNEY DISEASE STAGE I THROUGH STAGE IV, OR UNSPECIFIED: ICD-10-CM

## 2022-07-27 DIAGNOSIS — R05.3 CHRONIC COUGH: ICD-10-CM

## 2022-07-27 DIAGNOSIS — Z17.0 MALIGNANT NEOPLASM OF LOWER-INNER QUADRANT OF LEFT BREAST IN FEMALE, ESTROGEN RECEPTOR POSITIVE (H): ICD-10-CM

## 2022-07-27 DIAGNOSIS — Z79.4 TYPE 2 DIABETES MELLITUS WITH STAGE 3 CHRONIC KIDNEY DISEASE, WITH LONG-TERM CURRENT USE OF INSULIN, UNSPECIFIED WHETHER STAGE 3A OR 3B CKD (H): ICD-10-CM

## 2022-07-27 DIAGNOSIS — I25.5 ISCHEMIC CARDIOMYOPATHY: ICD-10-CM

## 2022-07-27 DIAGNOSIS — E66.01 MORBID OBESITY (H): Chronic | ICD-10-CM

## 2022-07-27 DIAGNOSIS — E03.9 HYPOTHYROIDISM, UNSPECIFIED TYPE: ICD-10-CM

## 2022-07-27 PROCEDURE — 99305 1ST NF CARE MODERATE MDM 35: CPT | Performed by: INTERNAL MEDICINE

## 2022-07-27 PROCEDURE — 99215 OFFICE O/P EST HI 40 MIN: CPT | Mod: 95 | Performed by: INTERNAL MEDICINE

## 2022-07-27 ASSESSMENT — SLEEP AND FATIGUE QUESTIONNAIRES
HOW LIKELY ARE YOU TO NOD OFF OR FALL ASLEEP WHEN YOU ARE A PASSENGER IN A CAR FOR AN HOUR WITHOUT A BREAK: WOULD NEVER DOZE
HOW LIKELY ARE YOU TO NOD OFF OR FALL ASLEEP WHILE LYING DOWN TO REST IN THE AFTERNOON WHEN CIRCUMSTANCES PERMIT: MODERATE CHANCE OF DOZING
HOW LIKELY ARE YOU TO NOD OFF OR FALL ASLEEP WHILE SITTING AND TALKING TO SOMEONE: WOULD NEVER DOZE
HOW LIKELY ARE YOU TO NOD OFF OR FALL ASLEEP WHILE WATCHING TV: HIGH CHANCE OF DOZING
HOW LIKELY ARE YOU TO NOD OFF OR FALL ASLEEP WHILE SITTING AND READING: HIGH CHANCE OF DOZING
HOW LIKELY ARE YOU TO NOD OFF OR FALL ASLEEP IN A CAR, WHILE STOPPED FOR A FEW MINUTES IN TRAFFIC: WOULD NEVER DOZE
HOW LIKELY ARE YOU TO NOD OFF OR FALL ASLEEP WHILE SITTING QUIETLY AFTER LUNCH WITHOUT ALCOHOL: MODERATE CHANCE OF DOZING
HOW LIKELY ARE YOU TO NOD OFF OR FALL ASLEEP WHILE SITTING INACTIVE IN A PUBLIC PLACE: WOULD NEVER DOZE
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
## 2022-07-27 NOTE — PATIENT INSTRUCTIONS
Try an anti inflammatory nasal spray like fluticasone, or mometasone two spray each nostril night  Okay to take the proair (albuterol) before bed    For general sleep health questions:   http://sleepeducation.org    For tips about PAP and COVID-19:  https://www.[facility].org/patients/patient-resources/resources/covid-19-and-home-pap-therapy.pdf    For general info about COVID-19 including vaccines:  https://[place].org/covid19      Continue PAP therapy every night, for all hours that you are sleeping (including naps.)  As always, try to get at least 8 hours of sleep or more each day, keep a regular sleep schedule, and avoid sleep deprivation. Avoid alcohol.  Reasons that you might need a change to your pressure therapy would be weight gain or loss, waking having inadvertently removed your PAP overnight, having previously felt refreshed by sleep with CPAP use and now waking un-refreshed, and return of daytime sleepiness. Also, the development of new medical problems  (such as heart failure, stroke, medications such as narcotics) can sometimes affect breathing at night and change your PAP therapy needs.  Please bring PAP with you if you are hospitalized.  If anticipating surgery be sure to discuss with your surgeon that you have sleep apnea and use PAP therapy.    Maintain your equipment as recommended which includes routine cleaning and replacement of supplies.      Call DME for any questions regarding supplies or maintenance.    [facility], [facility] [phone]    Do not drive on engage in potentially dangerous activities if feeling sleepy.  Please follow up in sleep clinic again in 3 months.        Tips for your PAP use-    Mask fitting tips  Mask fitting exercise:    To improve your mask seal and your mobility at night, put mask on and secure in place.  Lie down in bed with full pressure and roll to one side, adjust headgear while in that position to eliminate  any leaks. Repeat process rolling to other side.     The mask seal does not have to be perfect:   CPAP machines are designed to make up for small leaks. However, you will not tolerate leaks blowing in your eyes so you will need to adjust.   Any leak should only be near or at the bottom of the mask.  We expect your mask to leak slightly at night.    Do not over-tighten the headgear straps, tighter IS NOT better, we expect minimal leak.    First try re-positioning the mask or headgear before tightening the headgear straps.  Mask leaks are expected due to changing sleeping positions. Try pulling the mask away from your skin allowing the cushion to re-inflate will minimize the leak.  If you struggle for a good fit, try turning the CPAP off and then readjust the mask by pulling it away from your face and then turning back on the CPAP.        Humidifier tips  Humidifiers can be adjusted to increase or decrease the amount of moisture according to your comfort level. You may need to adjust this frequently at first, but then might only change it with seasonal weather changes.     Try INCREASING the humidity if:  You experience a dry, irritated nasal passage or throat.  You have a runny, drippy nose or sneezing fits after using CPAP.  You experience nasal congestion during or after CPAP use.    Try DECREASING the humidity if:  You have excessive condensation or  rain out  in the tubing or mask.  Otherwise keep the tubing warm during the night by running it underneath the blankets or pillow.      Clinic visit after initial PAP set-up   Bring your equipment with you to your 5-8 week follow up clinic visit.  We will be extracting your data from the machine if not available from the cloud based [employer].        Travel  Always take your equipment with you when you travel.  If you fly with your equipment bring it on with you as a carry on.  Medical equipment does not count as a carry on.    If you travel international the machines  take 110-240v.  The only adapter needed is the adapter that will fit into the receptacle (outlet).    You may also want to bring an extension cord as many hotel rooms have limited outlets at the bedside.  Do not travel with water in your humidifier chamber.     Cleaning and Maintenance Guidelines    Equipment Frequency Cleaning Method   Mask First Day    Daily      Weekly Soak mask in hot soapy water for 30 minutes, rinse and air dry.  Wipe nasal cushion with a hot soapy (Ivory, baby shampoo) cloth and rinse.  Baby wipes may also be used.  Do not use anti-bacterial soaps,Lore  liquid soap, rubbing alcohol, bleach or ammonia.  Wash frame in hot soapy water (Ivory, baby shampoo) rinse and let air dry   Headgear Biweekly Wash in hot soapy water, rinse and air dry   Reusable Gray Filter Weekly Wash in hot soapy water, rinse, put in towel squeeze moisture out, let air dry   Disposable White Filter Check Weekly Replace when brown or gray in color; at least every 2 to 3 months   Humidifier Chamber Daily    Weekly Empty distilled water from humidifier and let air dry    Hand wash in hot soapy water, rinse and air dry   Tubing Weekly Wash in hot soapy water, rinse and let air dry   Mask, Tubing and Humidifier Chamber As needed Disinfect: Soak in 1 part distilled white vinegar to 3 parts hot water for 30 minutes, rinse well and air dry  Not the material headgear        MASK AND SUPPLY REORDERING and EQUIPMENT NEEDS through your DME and per your insurance  Reminder: Most insurance companies will allow for a new mask, headgear, tubing, and reusable gray filter every six months.  Disposable white ultra-fine filters are covered monthly.      HOME AND SAFETY INSTRUCTIONS  Do not use frayed or cracked electrical cords, multi plug adaptors, or switched receptacles  Do not immerse electrical equipment into water  Assure that electrical cords do not become a tripping hazard
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
## 2022-07-28 ENCOUNTER — DOCUMENTATION ONLY (OUTPATIENT)
Dept: SLEEP MEDICINE | Facility: CLINIC | Age: 77
End: 2022-07-28

## 2022-07-28 NOTE — NURSING NOTE
Durable medical equipment order routed to [facility].  CPAP compliance instruction letter sent via [employer].         JOSE GUADALUPE Harrison  [facility]

## 2022-07-29 ENCOUNTER — TRANSITIONAL CARE UNIT VISIT (OUTPATIENT)
Dept: GERIATRICS | Facility: CLINIC | Age: 77
End: 2022-07-29
Payer: MEDICARE

## 2022-07-29 DIAGNOSIS — E03.9 HYPOTHYROIDISM, UNSPECIFIED TYPE: ICD-10-CM

## 2022-07-29 DIAGNOSIS — I10 HYPERTENSION GOAL BP (BLOOD PRESSURE) < 130/80: Chronic | ICD-10-CM

## 2022-07-29 DIAGNOSIS — Z79.4 TYPE 2 DIABETES MELLITUS WITH STAGE 3 CHRONIC KIDNEY DISEASE, WITH LONG-TERM CURRENT USE OF INSULIN, UNSPECIFIED WHETHER STAGE 3A OR 3B CKD (H): ICD-10-CM

## 2022-07-29 DIAGNOSIS — R41.841 COGNITIVE COMMUNICATION DEFICIT: ICD-10-CM

## 2022-07-29 DIAGNOSIS — I25.5 ISCHEMIC CARDIOMYOPATHY: ICD-10-CM

## 2022-07-29 DIAGNOSIS — C50.312 MALIGNANT NEOPLASM OF LOWER-INNER QUADRANT OF LEFT BREAST IN FEMALE, ESTROGEN RECEPTOR POSITIVE (H): ICD-10-CM

## 2022-07-29 DIAGNOSIS — D50.9 IRON DEFICIENCY ANEMIA, UNSPECIFIED IRON DEFICIENCY ANEMIA TYPE: ICD-10-CM

## 2022-07-29 DIAGNOSIS — G89.29 CHRONIC BACK PAIN, UNSPECIFIED BACK LOCATION, UNSPECIFIED BACK PAIN LATERALITY: ICD-10-CM

## 2022-07-29 DIAGNOSIS — G47.33 OSA (OBSTRUCTIVE SLEEP APNEA): ICD-10-CM

## 2022-07-29 DIAGNOSIS — E11.22 TYPE 2 DIABETES MELLITUS WITH STAGE 3 CHRONIC KIDNEY DISEASE, WITH LONG-TERM CURRENT USE OF INSULIN, UNSPECIFIED WHETHER STAGE 3A OR 3B CKD (H): ICD-10-CM

## 2022-07-29 DIAGNOSIS — I25.10 CORONARY ARTERY DISEASE INVOLVING NATIVE CORONARY ARTERY OF NATIVE HEART WITHOUT ANGINA PECTORIS: ICD-10-CM

## 2022-07-29 DIAGNOSIS — I12.9 BENIGN HYPERTENSIVE KIDNEY DISEASE WITH CHRONIC KIDNEY DISEASE STAGE I THROUGH STAGE IV, OR UNSPECIFIED: ICD-10-CM

## 2022-07-29 DIAGNOSIS — N18.30 TYPE 2 DIABETES MELLITUS WITH STAGE 3 CHRONIC KIDNEY DISEASE, WITH LONG-TERM CURRENT USE OF INSULIN, UNSPECIFIED WHETHER STAGE 3A OR 3B CKD (H): ICD-10-CM

## 2022-07-29 DIAGNOSIS — E66.01 MORBID OBESITY (H): ICD-10-CM

## 2022-07-29 DIAGNOSIS — Z17.0 MALIGNANT NEOPLASM OF LOWER-INNER QUADRANT OF LEFT BREAST IN FEMALE, ESTROGEN RECEPTOR POSITIVE (H): ICD-10-CM

## 2022-07-29 DIAGNOSIS — J44.9 CHRONIC OBSTRUCTIVE PULMONARY DISEASE, UNSPECIFIED COPD TYPE (H): ICD-10-CM

## 2022-07-29 DIAGNOSIS — M54.9 CHRONIC BACK PAIN, UNSPECIFIED BACK LOCATION, UNSPECIFIED BACK PAIN LATERALITY: ICD-10-CM
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
## 2022-07-29 PROCEDURE — 99309 SBSQ NF CARE MODERATE MDM 30: CPT | Performed by: NURSE PRACTITIONER
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
## 2022-07-30 VITALS
RESPIRATION RATE: 17 BRPM | HEIGHT: 66 IN | TEMPERATURE: 97.3 F | BODY MASS INDEX: 47.09 KG/M2 | WEIGHT: 293 LBS | HEART RATE: 63 BPM | DIASTOLIC BLOOD PRESSURE: 82 MMHG | SYSTOLIC BLOOD PRESSURE: 144 MMHG | OXYGEN SATURATION: 97 %
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
## 2022-08-01 ENCOUNTER — ALLIED HEALTH/NURSE VISIT (OUTPATIENT)
Dept: CARDIOLOGY | Facility: CLINIC | Age: 77
End: 2022-08-01
Attending: NURSE PRACTITIONER
Payer: MEDICARE

## 2022-08-01 DIAGNOSIS — I50.30 (HFPEF) HEART FAILURE WITH PRESERVED EJECTION FRACTION (H): Primary | ICD-10-CM

## 2022-08-02 ENCOUNTER — MYC MEDICAL ADVICE (OUTPATIENT)
Dept: ENDOCRINOLOGY | Facility: CLINIC | Age: 77
End: 2022-08-02

## 2022-08-02 ENCOUNTER — TRANSITIONAL CARE UNIT VISIT (OUTPATIENT)
Dept: GERIATRICS | Facility: CLINIC | Age: 77
End: 2022-08-02
Payer: MEDICARE

## 2022-08-02 VITALS
BODY MASS INDEX: 47.09 KG/M2 | TEMPERATURE: 97.8 F | HEART RATE: 70 BPM | WEIGHT: 293 LBS | RESPIRATION RATE: 30 BRPM | OXYGEN SATURATION: 98 % | DIASTOLIC BLOOD PRESSURE: 71 MMHG | HEIGHT: 66 IN | SYSTOLIC BLOOD PRESSURE: 169 MMHG

## 2022-08-02 DIAGNOSIS — C50.312 MALIGNANT NEOPLASM OF LOWER-INNER QUADRANT OF LEFT BREAST IN FEMALE, ESTROGEN RECEPTOR POSITIVE (H): ICD-10-CM

## 2022-08-02 DIAGNOSIS — N18.30 TYPE 2 DIABETES MELLITUS WITH STAGE 3 CHRONIC KIDNEY DISEASE, WITH LONG-TERM CURRENT USE OF INSULIN, UNSPECIFIED WHETHER STAGE 3A OR 3B CKD (H): ICD-10-CM

## 2022-08-02 DIAGNOSIS — Z79.4 TYPE 2 DIABETES MELLITUS WITH STAGE 3 CHRONIC KIDNEY DISEASE, WITH LONG-TERM CURRENT USE OF INSULIN, UNSPECIFIED WHETHER STAGE 3A OR 3B CKD (H): ICD-10-CM

## 2022-08-02 DIAGNOSIS — E03.9 HYPOTHYROIDISM, UNSPECIFIED TYPE: ICD-10-CM

## 2022-08-02 DIAGNOSIS — I12.9 BENIGN HYPERTENSIVE KIDNEY DISEASE WITH CHRONIC KIDNEY DISEASE STAGE I THROUGH STAGE IV, OR UNSPECIFIED: ICD-10-CM

## 2022-08-02 DIAGNOSIS — R41.841 COGNITIVE COMMUNICATION DEFICIT: ICD-10-CM

## 2022-08-02 DIAGNOSIS — I10 HYPERTENSION GOAL BP (BLOOD PRESSURE) < 130/80: ICD-10-CM

## 2022-08-02 DIAGNOSIS — I25.5 ISCHEMIC CARDIOMYOPATHY: ICD-10-CM

## 2022-08-02 DIAGNOSIS — D50.9 IRON DEFICIENCY ANEMIA, UNSPECIFIED IRON DEFICIENCY ANEMIA TYPE: ICD-10-CM

## 2022-08-02 DIAGNOSIS — G89.29 CHRONIC BACK PAIN, UNSPECIFIED BACK LOCATION, UNSPECIFIED BACK PAIN LATERALITY: ICD-10-CM

## 2022-08-02 DIAGNOSIS — E66.01 MORBID OBESITY (H): ICD-10-CM
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
## 2022-08-02 DIAGNOSIS — J44.9 CHRONIC OBSTRUCTIVE PULMONARY DISEASE, UNSPECIFIED COPD TYPE (H): ICD-10-CM

## 2022-08-02 DIAGNOSIS — M54.9 CHRONIC BACK PAIN, UNSPECIFIED BACK LOCATION, UNSPECIFIED BACK PAIN LATERALITY: ICD-10-CM

## 2022-08-02 DIAGNOSIS — I25.10 CORONARY ARTERY DISEASE INVOLVING NATIVE CORONARY ARTERY OF NATIVE HEART WITHOUT ANGINA PECTORIS: ICD-10-CM

## 2022-08-02 DIAGNOSIS — Z17.0 MALIGNANT NEOPLASM OF LOWER-INNER QUADRANT OF LEFT BREAST IN FEMALE, ESTROGEN RECEPTOR POSITIVE (H): ICD-10-CM

## 2022-08-02 DIAGNOSIS — G47.33 OSA (OBSTRUCTIVE SLEEP APNEA): ICD-10-CM

## 2022-08-02 DIAGNOSIS — E11.22 TYPE 2 DIABETES MELLITUS WITH STAGE 3 CHRONIC KIDNEY DISEASE, WITH LONG-TERM CURRENT USE OF INSULIN, UNSPECIFIED WHETHER STAGE 3A OR 3B CKD (H): ICD-10-CM

## 2022-08-02 PROCEDURE — 99316 NF DSCHRG MGMT 30 MIN+: CPT | Performed by: NURSE PRACTITIONER
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
## 2022-08-02 NOTE — PROGRESS NOTES
Mariel is a 77 year old who is being evaluated via a billable video visit.      How would you like to obtain your AVS? MyChart  If the video visit is dropped, the invitation should be resent by: Text to cell phone: [phone]  Will anyone else be joining your video visit? Yes, Odalis on same screen         Outcome for 08/08/22 12:37 PM: Kaylah emailed to provider  TERESITA Gregorio      Video: 2:11 - 2:34    [facility]  Endocrinology  Muriel Will PA-C  08/09/2022      Chief Complaint:   Diabetes    History of Present Illness:   Mariel Ribeiro is a 75 year old female with a history of type II diabetes mellitus, kidney failure, TIA, CHF, CAD status post CABG, COPD, thyroid cancer status post thyroidectomy and resultant hypothyroidism, and hypertension who presents for  follow up of her diabetes.     She was diagnosed with type 2 diabetes mellitus in 2007. Initially managed with oral Metformin, Januvia, and Glimepiride, all have been discontinued due to declining renal fucntion. She was reasonably well managed with NPH 28 units bid and Prandin with meals when seen 10/31/2019.  I saw her as a hospital follow-up in December 2019 with BG above goal and advised increasing Lantus to 31 and if needed 33 units daily and continuing prandin with meals and advised she see CDE.    Other recent visit notes:    Feb 2021: Met with Mariel and roommate Odalis.   Newer eye glasses not working, concerns about cognition and referred for neurology.  Concerned about renal disease, BG at goal.  Discussed starting Empagliflozin in place of Prandin but did not.      Feb 2022: BG at goal with Lantus 32 u, bid Prandin with meals, and careful diet but with 4% hypoglycemia.  Advised to decrease her Lantus to 29 units daily and take her Lantus at the same time daily.    Interim: Was at [facility] in July with increased confusion weakness severe hypertension and a fall.    Just returned home from [facility] home last Friday    Today:  With Odalis and dog  Kristina.  Just returned home from [facility] last Friday after prolonged recovery following her hospitalization.  Worked hard at rehab and moving a lot better, but not perfect.  When she was in the observation room her BG were really high.  They worked with provider at [facility] but blood sugars were high there.     Now back at home since just last Friday.  She has a good appetite and is eating pretty well.  She is walking periodically.  Will have OT and PT at home.  She is able to walk to and from restroom.  Also getting in and out of bed is at times troublesome.  Walker is helpful.      She has an appointment with neurology in October - had neuropsych evaluation and they are not sure what kind of cognitive issue she has.          Current DM therapy:  Lantus 29 units daily  Prandin/ repaglinide 1 mg twice daily with each of her meals.   - Has not been taking this way, just taking twice daily.        Blood Glucose Monitoring:          Not scanning regularly since home.    Diabetes monitoring and complications:  CAD: Yes  Last eye exam results: mild diabetic retinopathy (12/12/2018) .  None was noted at the last eye exam I see scanned in 2020.  It is reported in the chart that she had a more recent exam 6/10/2022 but I do not see the results.  Microalbuminuria: 31.53 (5/22/2019)  Neuropathy: Yes  HTN: Yes  On Statin: Yes  On Aspirin: Yes  Depression: Yes  Erectile dysfunction: N/A      PMH: reviewed - any significant changes noted above.  Meds: Reviewed and updated in module and above.  Allergies: Reviewed and where applicable updated in module.  SH: Pertinent items reviewed with patient and changes noted above.  FH: Pertinent reviewed with patient and noted above.    ROS: Brief review of symptoms reveals stable condition except where noted above.         Social History:   She lives with her roommate Odalis and her dog Rimma. Odalis is an  and still working form home.  They eat between 8 and 10 pm.    Tobacco  "Use: former smoker, 27 years, quit 1989  Alcohol Use: none  Drug Use: none  PCP: Malika Wolf      Physical Exam:   There were no vitals taken for this visit.     Wt Readings from Last 10 Encounters:   08/02/22 142.6 kg (314 lb 6.4 oz)   07/30/22 144.3 kg (318 lb 3.2 oz)   07/27/22 136.1 kg (300 lb)   07/27/22 142.4 kg (314 lb)   07/13/22 137.4 kg (303 lb)   04/20/22 144.7 kg (319 lb)   04/18/22 144.2 kg (318 lb)   03/21/22 144.7 kg (319 lb)   02/21/22 144.7 kg (319 lb)   01/20/22 144.2 kg (318 lb)      General: Pleasant, well nourished and hydrated obese female in NAD.  Accompanied by Odalis/    Psych:  Mood is \"good,\" affect is appropriate.  Thought form and content are fluid and coherent.    HEENT: Eyes and sclera are clear. Extraocular movements are grossly intact without proptosis.  Nares are patent, mucous membranes moist.  Neck: No masses or JVD are noted.    Resp: Easy and unlabored breathing.   Neuro: Alert and oriented, communicating clearly. She is able to recall her diabetic medications without difficulty and discuss her BG numbers in general, what her goals for them each day are.    Ext: no swelling or edema     Data:  Lab Results   Component Value Date     07/16/2022    POTASSIUM 3.7 07/16/2022    CHLORIDE 106 07/16/2022    CO2 24 07/16/2022    ANIONGAP 12 07/16/2022     (H) 07/16/2022    BUN 27.2 (H) 07/16/2022    CR 1.17 (H) 07/16/2022    ANTOINETTE 9.9 07/16/2022     Lab Results   Component Value Date    GFRESTIMATED 48 (L) 07/16/2022    GFRESTIMATED 36 (L) 07/14/2022    GFRESTIMATED 60 (L) 07/13/2022    GFRESTBLACK 46 (L) 07/09/2021    GFRESTBLACK 39 (L) 04/29/2021    GFRESTBLACK 44 (L) 03/18/2021      Recent Labs   Lab Test 07/16/22  1018 07/14/22  1537 07/13/22  1642 07/13/22  0619 07/12/22  1423 03/09/22  1304 01/20/22  1500 07/09/21  1700 04/29/21  1116 03/18/21  1423 03/02/21  1633 11/06/19  1233 10/31/19  0000 10/30/19  1537 10/01/19  1133 04/09/19  2050 04/03/19  0000   A1C  --   --   " --   --  7.2* 6.8*  --    < > 6.8*  --   --    < >  --   --   --    < >  --    HEMOGLOBINA1  --   --   --   --   --   --   --   --   --   --   --   --  7.2*  --   --   --  6.5*   TSH  --   --  1.98  --   --  3.56  --   --  7.12*  --   --   --   --   --  5.02*  --   --    T4  --   --   --   --   --   --   --   --  0.87  --   --   --   --   --  0.97  --   --    LDL  --   --   --   --   --  89  --   --  117*  --   --   --   --   --   --   --   --    HDL  --   --   --   --   --  57  --   --  57  --   --   --   --   --   --   --   --    TRIG  --   --   --   --   --  131  --   --  191*  --   --   --   --   --   --   --   --    CR 1.17* 1.50*  --    < > 1.25* 1.37*  --    < > 1.51*   < >  --    < >  --    < > 1.59*   < >  --    MICROL  --   --   --   --   --   --  326  --   --   --  39  --   --   --   --    < >  --     < > = values in this interval not displayed.         Assessment and Plan:  Type 2 diabetes mellitus with stage 3 chronic kidney disease, with long-term current use of insulin (H)    Mariel is a 77 year old female with type II diabetes complicated by TIA, CHF, CAD status post CABG, COPD, thyroid cancer status post thyroidectomy and resultant hypothyroidism, obesity, depression, chronic pain and hypertension.    She is recently returned home after hospitalization last month for increased confusion, weakness and a fall.  She had a prolonged rehabilitation at [facility] where her diabetes was reasonably controlled with a GMI of 7.4% per smith in the last 2 weeks.  She is not having any hypoglycemia      Plan:  Continue Lantus 29 units each morning repaglinide 1 mg twice daily with each meal.  Spent time discussing with Mariel and Odalis will need to scan the blood sugar in the morning when she wakes up and then before dinner or at bedtime to have a more continuous reading of what her blood sugars are.    Advised:    Please continue Lantus 29 units each morning and Repaglinide 1 mg with each meal.       Goals:  Fasting blood sugar 90 - 140  Bedtime blood sugar 100 - 180  No blood sugars <70  Rare prolonged blood sugars >200.  (i.e. >4 hours.)  No BG >350 (call if any prolonged)   Please call clinic if often not meeting goals.    Otherwise, I will see you in 3-6 months.    Obesity:  Overall her weight is up but I suspect this is due to fluid at this point.  This is not a primary concern.    HTN/CKD:  stable stage 3, with GFR stable Cr stable.  During admission she had severe hypertension and medications were adjusted.  She will continue to follow-up with nephrology.        DM Complications-   Mariel and Odalis again had a number of questions about diet and nutrition which we discussed at some length.  They defer a referral to nutrition at this point.     Retinopathy:   Most recent reported eye exam 6/10/2022 but have not yet seen that.  Nephropathy:  Yes, she will continue to see Dr. Warren rodriguez.  Neuropathy: Yes peripheral neuropathy.  She sees Dr. Fitzgerald in podiatry   feet: She sees Dr. Fitzgerald in podiatry   Lipids: Has CAD and is taking a statin.     Hypothyroidism: Her TSH was within normal range July 2022.  She will continue to take her current dose of levothyroxine.     Follow-up: Return in 4 months   34 minutes in preparation for visit reviewing chart, labs and documentation, visiting with patient gathering history and in exam, education and counseling, as well as coordination of care, further chart review and documentation following visit on this date of service and as alluded to documented above.  Extended time and education regarding concerns with hypoglycemia.      It is my privilege to be involved in the care of the above patient.     Muriel Will PA-C, MPAS  [facility]  Diabetes, Endocrinology, and Metabolism  [phone] Appointments/Nurse  [phone] Fax  [phone] pager  [phone] nurse line

## 2022-08-03 ENCOUNTER — TELEPHONE (OUTPATIENT)
Dept: SLEEP MEDICINE | Facility: CLINIC | Age: 77
End: 2022-08-03

## 2022-08-03 NOTE — TELEPHONE ENCOUNTER
Patient came to virtual set up via telephone visit for mask fitting appointment on August 3, 2022. Patient requested to switch masks because general discomfort . Discussed the following masks: AIRFIT F20, AIRFIT F30I, VITERA Patient selected a Resmed Mask name: F30I Full Face mask size Small

## 2022-08-04 ENCOUNTER — DOCUMENTATION ONLY (OUTPATIENT)
Dept: SLEEP MEDICINE | Facility: CLINIC | Age: 77
End: 2022-08-04

## 2022-08-04 DIAGNOSIS — Z79.4 TYPE 2 DIABETES MELLITUS WITH STAGE 3 CHRONIC KIDNEY DISEASE, WITH LONG-TERM CURRENT USE OF INSULIN, UNSPECIFIED WHETHER STAGE 3A OR 3B CKD (H): Chronic | ICD-10-CM

## 2022-08-04 DIAGNOSIS — E11.22 TYPE 2 DIABETES MELLITUS WITH STAGE 3 CHRONIC KIDNEY DISEASE, WITH LONG-TERM CURRENT USE OF INSULIN, UNSPECIFIED WHETHER STAGE 3A OR 3B CKD (H): Chronic | ICD-10-CM

## 2022-08-04 DIAGNOSIS — N18.30 TYPE 2 DIABETES MELLITUS WITH STAGE 3 CHRONIC KIDNEY DISEASE, WITH LONG-TERM CURRENT USE OF INSULIN, UNSPECIFIED WHETHER STAGE 3A OR 3B CKD (H): Chronic | ICD-10-CM

## 2022-08-04 RX ORDER — INSULIN GLARGINE 100 [IU]/ML
29 INJECTION, SOLUTION SUBCUTANEOUS DAILY
Qty: 15 ML | Refills: 3 | Status: SHIPPED | OUTPATIENT
Start: 2022-08-04 | End: 2023-01-03
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
## 2022-08-08 NOTE — PROGRESS NOTES
Patient has been hospitalized and in TCU. No recent readings, therefore no billing for this month. This is an erroneous encounter. Rosina Mays RN

## 2022-08-09 ENCOUNTER — TELEPHONE (OUTPATIENT)
Dept: FAMILY MEDICINE | Facility: CLINIC | Age: 77
End: 2022-08-09

## 2022-08-09 ENCOUNTER — VIRTUAL VISIT (OUTPATIENT)
Dept: ENDOCRINOLOGY | Facility: CLINIC | Age: 77
End: 2022-08-09
Payer: MEDICARE

## 2022-08-09 DIAGNOSIS — E89.0 POSTOPERATIVE HYPOTHYROIDISM: ICD-10-CM

## 2022-08-09 DIAGNOSIS — E11.22 TYPE 2 DIABETES MELLITUS WITH STAGE 3 CHRONIC KIDNEY DISEASE, WITH LONG-TERM CURRENT USE OF INSULIN, UNSPECIFIED WHETHER STAGE 3A OR 3B CKD (H): Primary | ICD-10-CM

## 2022-08-09 DIAGNOSIS — R41.3 MEMORY LOSS: ICD-10-CM

## 2022-08-09 DIAGNOSIS — Z79.4 TYPE 2 DIABETES MELLITUS WITH STAGE 3 CHRONIC KIDNEY DISEASE, WITH LONG-TERM CURRENT USE OF INSULIN, UNSPECIFIED WHETHER STAGE 3A OR 3B CKD (H): Primary | ICD-10-CM

## 2022-08-09 DIAGNOSIS — N18.4 CKD (CHRONIC KIDNEY DISEASE) STAGE 4, GFR 15-29 ML/MIN (H): ICD-10-CM

## 2022-08-09 DIAGNOSIS — N18.30 TYPE 2 DIABETES MELLITUS WITH STAGE 3 CHRONIC KIDNEY DISEASE, WITH LONG-TERM CURRENT USE OF INSULIN, UNSPECIFIED WHETHER STAGE 3A OR 3B CKD (H): Primary | ICD-10-CM

## 2022-08-09 DIAGNOSIS — E66.01 MORBID OBESITY (H): ICD-10-CM

## 2022-08-09 DIAGNOSIS — I50.32 CHRONIC DIASTOLIC HEART FAILURE (H): ICD-10-CM

## 2022-08-09 PROCEDURE — 99214 OFFICE O/P EST MOD 30 MIN: CPT | Mod: 95 | Performed by: PHYSICIAN ASSISTANT

## 2022-08-09 NOTE — PATIENT INSTRUCTIONS
Dear Mariel,  I am glad that you are back at home.    Please continue Lantus 29 units each morning and Repaglinide 1 mg with each meal.      Goals:  Fasting blood sugar 90 - 140  Bedtime blood sugar 100 - 180  No blood sugars <70  Rare prolonged blood sugars >200.  (i.e. >4 hours.)  No BG >350 (call if any prolonged)   Please call clinic if often not meeting goals.    Otherwise, I will see you in 3-6 months.    My best wishes,    Muriel Will PA-C, MPAS  [facility]  Diabetes, Endocrinology, and Metabolism  [phone] Appointments/Nurse  [phone] Fax  [phone] URGENTafter hours/weekend Endocrinologist on call

## 2022-08-09 NOTE — TELEPHONE ENCOUNTER
Reason for Call: Other call back    Detailed comments: [facility] is requesting orders for -  Ongoing skilled nursing 1 x week for 4 weeks  PT OT eval and treat  Home Health Aid 1 x week for 4 weeks to help with showers and personal cares    KEIRA Jacobo states patient denied , not needed at this time, would like to cancel order    Phone Number Patient can be reached at: [phone]    Best Time: Any    Can we leave a detailed message on this number? YES    Call taken on 8/9/2022 at 11:07 AM by Korin Castro

## 2022-08-09 NOTE — MR AVS SNAPSHOT
After Visit Summary   8/20/2018    Mariel Ribeiro    MRN: [id number]           Patient Information     Date Of Birth          1945        Visit Information        Provider Department      8/20/2018 10:30 AM Karen Hilton RPH [facility]        Today's Diagnoses     Type 2 diabetes mellitus with chronic kidney disease, without long-term current use of insulin, unspecified CKD stage (H)    -  1    S/P CABG x 3        Hypokalemia        Hypertension goal BP (blood pressure) < 130/80        Vasovagal syncope        Acute posthemorrhagic anemia          Care Instructions    Dear Mariel,     It was a pleasure talking with you today regarding your medications and medical concerns. The following is a summary of what we discussed.     1. FYI--Please have Rosina recheck your Potassium lab on 8-23-18.   2. FYI--Keep taking insulin doses and prandin daily --try not to miss lunch dose of prandin.      Follow-up in November/december if your able to/interested.     Thank you for your time and please feel free to call ([phone] voicemail/pager or [phone] clinic main line) or e-mail ([email or web address]) with any questions.     Karen Hilton Rph.  Medication Therapy Management Provider  [phone]              Follow-ups after your visit        Your next 10 appointments already scheduled     Aug 23, 2018 11:30 AM CDT   Office Visit with Rosina Blount NP   [facility] ([facility])    [address] [place] [id number] [phone]           Bring a current list of meds and any records pertaining to this visit. For Physicals, please bring immunization records and any forms needing to be filled out. Please arrive 10 minutes early to complete paperwork.            Aug 28, 2018  1:30 PM CDT   Lab with  LAB    [facility] ([facility])    [address] [address]  Well-controlled on Lopressor and Prinzide. Continue the same.   [phone]            Aug 28, 2018  2:00 PM CDT   (Arrive by 1:45 PM)   CORE RETURN with CHANTAL Martinez CNP   [facility] ([facility])    [address]  [address]  [facility] [address]   [phone]            Oct 18, 2018  1:30 PM CDT   (Arrive by 1:15 PM)   RETURN DIABETES with Odalis Medley PA-C   [facility] ([facility])    [address] [address] [phone]            Jan 14, 2019  2:30 PM CST   (Arrive by 2:15 PM)   RETURN DIABETES with Keven Jaeger MD   [facility] ([facility])    [address] [id number] [phone]              Who to contact     If you have questions or need follow up information about today's clinic visit or your schedule please contact [facility] directly at [phone].  Normal or non-critical lab and imaging results will be communicated to you by Yunnohart, letter or phone within 4 business days after the clinic has received the results. If you do not hear from us within 7 days, please contact the clinic through Yunnohart or phone. If you have a critical or abnormal lab result, we will notify you by phone as soon as possible.  Submit refill requests through [employer] or call your pharmacy and they will forward the refill request to us. Please allow 3 business days for your refill to be completed.          Additional Information About Your Visit        MyChart Information     [employer] gives you secure access to your electronic health record. If you see a primary care provider, you can also send messages to your care team and make appointments. If you have questions, please call your primary care clinic.  If you do not have a primary care provider, please call [phone] and they will assist you.        Care EveryWhere ID     This is your Care  EveryWhere ID. This could be used by other organizations to access your [place] medical records  [id number]         Blood Pressure from Last 3 Encounters:   08/16/18 130/52   08/15/18 98/63   08/15/18 98/63    Weight from Last 3 Encounters:   08/16/18 298 lb 11.2 oz (135.5 kg)   08/15/18 303 lb (137.4 kg)   08/15/18 303 lb (137.4 kg)              Today, you had the following     No orders found for display       Primary Care Provider Office Phone # Fax #    Rafaela William [phone] [address] [place] [address]        Equal Access to Services     KJ SALVADOR : Hadii von Martin, wayoseph cox, qaybta kaalmada adeegyada, gopal pena . So [facility] [phone].    ATENCIÓN: Si habla español, tiene a gillespie disposición servicios gratuitos de asistencia lingüística. LlGrand [facility] [phone].    We comply with applicable federal civil rights laws and [place] laws. We do not discriminate on the basis of race, color, national origin, age, disability, sex, sexual orientation, or gender identity.            Thank you!     Thank you for choosing [facility]  for your care. Our goal is always to provide you with excellent care. Hearing back from our patients is one way we can continue to improve our services. Please take a few minutes to complete the written survey that you may receive in the mail after your visit with us. Thank you!             Your Updated Medication List - Protect others around you: Learn how to safely use, store and throw away your medicines at www.disposemymeds.org.          This list is accurate as of 8/20/18  2:56 PM.  Always use your most recent med list.                   Brand Name Dispense Instructions for use Diagnosis    acetaminophen 325 MG tablet    TYLENOL    100 tablet    Take 2 tablets (650 mg) by mouth every 4 hours as needed for mild pain    S/P CABG x 3, Acute post-operative pain        albuterol 108 (90 Base) MCG/ACT inhaler    PROAIR HFA    1 Inhaler    Inhale 1-2 puffs into the lungs every 4 hours as needed for wheezing    Chronic obstructive pulmonary disease, unspecified COPD type (H)       aspirin 325 MG EC tablet     30 tablet    Take 1 tablet by mouth daily.        atorvastatin 40 MG tablet    LIPITOR    30 tablet    Take 1 tablet (40 mg) by mouth daily    Atherosclerosis of native coronary artery without angina pectoris, unspecified whether native or transplanted heart       B-D U/F 31G X 5 MM   Generic drug:  insulin pen needle     100 each    USE FOR INSULIN INJECTION    Type 2 diabetes mellitus with diabetic polyneuropathy, without long-term current use of insulin (H)       blood glucose monitoring test strip    no brand specified    200 each    1 strip by In Vitro route 2 times daily Strips per patient's preference    Type 2 diabetes mellitus with diabetic polyneuropathy, without long-term current use of insulin (H)       * bumetanide 2 MG tablet    BUMEX    30 tablet    Take 1 tablet (2 mg) by mouth every morning    Chronic diastolic heart failure (H)       * bumetanide 1 MG tablet    BUMEX    30 tablet    Take 1 tablet (1 mg) by mouth every evening    Chronic diastolic heart failure (H)       Cholecalciferol 87182 units Caps     30 capsule    Take 10,000 Units by mouth daily    Type 2 diabetes mellitus with diabetic chronic kidney disease, unspecified CKD stage, unspecified long term insulin use status (H)       clotrimazole 1 % cream    LOTRIMIN    60 g    Apply topically 2 times daily    Dermatitis       EPINEPHrine 0.3 MG/0.3ML injection 2-pack    EPIPEN/ADRENACLICK/or ANY BX GENERIC EQUIV    0.6 mL    Inject 0.3 mLs (0.3 mg) into the muscle once as needed for anaphylaxis    Allergic reaction, subsequent encounter       escitalopram 20 MG tablet    LEXAPRO    30 tablet    Take 1 tablet (20 mg) by mouth every morning    Recurrent major depressive disorder, in partial remission (H)        febuxostat 40 MG Tabs tablet    ULORIC    30 tablet    Take 1 tablet (40 mg) by mouth every evening    Vitamin D deficiency       ferrous gluconate 324 (38 Fe) MG tablet    FERGON    12 tablet    Take 1 tablet (324 mg) by mouth Every Mon, Wed, Fri Morning    Chronic diastolic heart failure (H), Iron deficiency anemia secondary to inadequate dietary iron intake       folic acid 800 MCG Tabs     30 tablet    Take 800 mcg by mouth daily    Atherosclerosis of native coronary artery without angina pectoris, unspecified whether native or transplanted heart       guaiFENesin 600 MG 12 hr tablet    MUCINEX     Take 1 tablet (600 mg) by mouth 2 times daily        * insulin isophane human 100 UNIT/ML injection    HumuLIN N PEN    10.5 mL    Inject 35 Units Subcutaneous daily (with dinner)    Type 2 diabetes mellitus with diabetic chronic kidney disease, unspecified CKD stage, unspecified long term insulin use status (H)       * insulin isophane human 100 UNIT/ML injection    HumuLIN N PEN    10.5 mL    Inject 35 Units Subcutaneous every 24 hours    Type 2 diabetes mellitus with diabetic chronic kidney disease, unspecified CKD stage, unspecified long term insulin use status (H)       insulin syringes (disposable) U-100 1 ML Misc     100 each    1 each 5 times daily    Type 2 diabetes mellitus with chronic kidney disease, without long-term current use of insulin, unspecified CKD stage (H)       ketamine (KETALAR) 5%-gabapentin (NEURONTIN) 8% 5%-8% Gel topical PLO cream     30 g    Apply 30 g topically 3 times daily        levothyroxine 150 MCG tablet    SYNTHROID/LEVOTHROID    30 tablet    Take 1 tablet (150 mcg) by mouth daily    Other specified hypothyroidism       NealyWearCAN FINEPOINT LANCETS Misc     100 each    Use to test blood sugars 2 times daily or as directed.    Type 2 diabetes mellitus with diabetic polyneuropathy, without long-term current use of insulin (H)       losartan 25 MG tablet    COZAAR    15 tablet     Take 0.5 tablets (12.5 mg) by mouth daily    Chronic diastolic heart failure (H)       Metoprolol Tartrate 75 MG Tabs     180 tablet    Take 75 mg by mouth 2 times daily    Atherosclerosis of native coronary artery without angina pectoris, unspecified whether native or transplanted heart       miconazole 2 % powder    MICATIN; MICRO GUARD    71 g    Apply topically 2 times daily    Dermatitis       niacin 500 MG CR tablet    SLO-NIACIN    60 tablet    Take 1 tablet (500 mg) by mouth 2 times daily    Atherosclerosis of native coronary artery without angina pectoris, unspecified whether native or transplanted heart       nitroGLYcerin 0.4 MG sublingual tablet    NITROSTAT    25 tablet    For chest pain place 1 tablet under the tongue every 5 minutes for 3 doses. If symptoms persist 5 minutes after 1st dose call 911.    Atherosclerosis of native coronary artery without angina pectoris, unspecified whether native or transplanted heart       ONE TOUCH ULTRA (DEVICES) Misc     1    test blood sugar BID    Type II or unspecified type diabetes mellitus without mention of complication, not stated as uncontrolled       polyethylene glycol Packet    MIRALAX/GLYCOLAX    7 packet    Take 17 g by mouth daily    Atherosclerosis of native coronary artery without angina pectoris, unspecified whether native or transplanted heart       potassium chloride SA 20 MEQ CR tablet    K-DUR/KLOR-CON M    60 tablet    Take 2 tablets (40 mEq) by mouth daily    Atherosclerosis of native coronary artery without angina pectoris, unspecified whether native or transplanted heart       pregabalin 100 MG capsule    LYRICA    90 capsule    Take 1 capsule (100 mg) by mouth 2 times daily    Pain in joint of left shoulder       repaglinide 1 MG tablet    PRANDIN    90 tablet    Take 1 tablet (1 mg) by mouth 3 times daily (before meals)    Type 2 diabetes mellitus with diabetic chronic kidney disease, unspecified CKD stage, unspecified long term insulin  use status (H)       senna-docusate 8.6-50 MG per tablet    SENOKOT-S;PERICOLACE    30 tablet    Take 1-2 tablets by mouth 2 times daily as needed for constipation    Atherosclerosis of native coronary artery without angina pectoris, unspecified whether native or transplanted heart       TIZANIDINE HCL PO      Take 4 mg by mouth At Bedtime        traZODone 50 MG tablet    DESYREL    270 tablet    Take 3 tablets (150 mg) by mouth At Bedtime    Recurrent major depressive disorder, in partial remission (H)       * Notice:  This list has 4 medication(s) that are the same as other medications prescribed for you. Read the directions carefully, and ask your doctor or other care provider to review them with you.

## 2022-08-09 NOTE — TELEPHONE ENCOUNTER
I called and gave verbal orders as written below    SONIA AlbertoN RN  [facility]

## 2022-08-09 NOTE — LETTER
8/9/2022       RE: Mariel Ribeiro  [address] [place] [address]     Dear Colleague,    Thank you for referring your patient, Mariel Ribeiro, to the [facility] [place] at [facility]. Please see a copy of my visit note below.    Mariel is a 77 year old who is being evaluated via a billable video visit.      How would you like to obtain your AVS? MyChart  If the video visit is dropped, the invitation should be resent by: Text to cell phone: [phone]  Will anyone else be joining your video visit? Yes, Odalis on same screen         Outcome for 08/08/22 12:37 PM: Kaylah emailed to provider  TERESITA Gregorio      Video: 2:11 - 2:34    [facility]  Endocrinology  Muriel Will PA-C  08/09/2022      Chief Complaint:   Diabetes    History of Present Illness:   Mariel Ribeiro is a 75 year old female with a history of type II diabetes mellitus, kidney failure, TIA, CHF, CAD status post CABG, COPD, thyroid cancer status post thyroidectomy and resultant hypothyroidism, and hypertension who presents for  follow up of her diabetes.     She was diagnosed with type 2 diabetes mellitus in 2007. Initially managed with oral Metformin, Januvia, and Glimepiride, all have been discontinued due to declining renal fucntion. She was reasonably well managed with NPH 28 units bid and Prandin with meals when seen 10/31/2019.  I saw her as a hospital follow-up in December 2019 with BG above goal and advised increasing Lantus to 31 and if needed 33 units daily and continuing prandin with meals and advised she see CDE.    Other recent visit notes:    Feb 2021: Met with Mariel and roommate Odalis.   Newer eye glasses not working, concerns about cognition and referred for neurology.  Concerned about renal disease, BG at goal.  Discussed starting Empagliflozin in place of Prandin but did not.      Feb 2022: BG at goal with Lantus 32 u, bid Prandin with  meals, and careful diet but with 4% hypoglycemia.  Advised to decrease her Lantus to 29 units daily and take her Lantus at the same time daily.    Interim: Was at [facility] in July with increased confusion weakness severe hypertension and a fall.    Just returned home from [facility] last Friday    Today:  With Odalis and michele Acevedo.  Just returned home from [facility] last Friday after prolonged recovery following her hospitalization.  Worked hard at rehab and moving a lot better, but not perfect.  When she was in the observation room her BG were really high.  They worked with provider at [facility] but blood sugars were high there.     Now back at home since just last Friday.  She has a good appetite and is eating pretty well.  She is walking periodically.  Will have OT and PT at home.  She is able to walk to and from restroom.  Also getting in and out of bed is at times troublesome.  Walker is helpful.      She has an appointment with neurology in October - had neuropsych evaluation and they are not sure what kind of cognitive issue she has.          Current DM therapy:  Lantus 29 units daily  Prandin/ repaglinide 1 mg twice daily with each of her meals.   - Has not been taking this way, just taking twice daily.        Blood Glucose Monitoring:          Not scanning regularly since home.    Diabetes monitoring and complications:  CAD: Yes  Last eye exam results: mild diabetic retinopathy (12/12/2018) .  None was noted at the last eye exam I see scanned in 2020.  It is reported in the chart that she had a more recent exam 6/10/2022 but I do not see the results.  Microalbuminuria: 31.53 (5/22/2019)  Neuropathy: Yes  HTN: Yes  On Statin: Yes  On Aspirin: Yes  Depression: Yes  Erectile dysfunction: N/A      PMH: reviewed - any significant changes noted above.  Meds: Reviewed and updated in module and above.  Allergies: Reviewed and where applicable updated in module.  SH: Pertinent items reviewed with patient and  "changes noted above.  FH: Pertinent reviewed with patient and noted above.    ROS: Brief review of symptoms reveals stable condition except where noted above.         Social History:   She lives with her roommate Odalis and her dog Rimma. Odalis is an  and still working form home.  They eat between 8 and 10 pm.    Tobacco Use: former smoker, 27 years, quit 1989  Alcohol Use: none  Drug Use: none  PCP: Malika Wolf      Physical Exam:   There were no vitals taken for this visit.     Wt Readings from Last 10 Encounters:   08/02/22 142.6 kg (314 lb 6.4 oz)   07/30/22 144.3 kg (318 lb 3.2 oz)   07/27/22 136.1 kg (300 lb)   07/27/22 142.4 kg (314 lb)   07/13/22 137.4 kg (303 lb)   04/20/22 144.7 kg (319 lb)   04/18/22 144.2 kg (318 lb)   03/21/22 144.7 kg (319 lb)   02/21/22 144.7 kg (319 lb)   01/20/22 144.2 kg (318 lb)      General: Pleasant, well nourished and hydrated obese female in NAD.  Accompanied by Odalis/    Psych:  Mood is \"good,\" affect is appropriate.  Thought form and content are fluid and coherent.    HEENT: Eyes and sclera are clear. Extraocular movements are grossly intact without proptosis.  Nares are patent, mucous membranes moist.  Neck: No masses or JVD are noted.    Resp: Easy and unlabored breathing.   Neuro: Alert and oriented, communicating clearly. She is able to recall her diabetic medications without difficulty and discuss her BG numbers in general, what her goals for them each day are.    Ext: no swelling or edema     Data:  Lab Results   Component Value Date     07/16/2022    POTASSIUM 3.7 07/16/2022    CHLORIDE 106 07/16/2022    CO2 24 07/16/2022    ANIONGAP 12 07/16/2022     (H) 07/16/2022    BUN 27.2 (H) 07/16/2022    CR 1.17 (H) 07/16/2022    ANTOINETTE 9.9 07/16/2022     Lab Results   Component Value Date    GFRESTIMATED 48 (L) 07/16/2022    GFRESTIMATED 36 (L) 07/14/2022    GFRESTIMATED 60 (L) 07/13/2022    GFRESTBLACK 46 (L) 07/09/2021    GFRESTBLACK 39 (L) 04/29/2021    " JUSTIN 44 (L) 03/18/2021      Recent Labs   Lab Test 07/16/22  1018 07/14/22  1537 07/13/22  1642 07/13/22  0619 07/12/22  1423 03/09/22  1304 01/20/22  1500 07/09/21  1700 04/29/21  1116 03/18/21  1423 03/02/21  1633 11/06/19  1233 10/31/19  0000 10/30/19  1537 10/01/19  1133 04/09/19 2050 04/03/19  0000   A1C  --   --   --   --  7.2* 6.8*  --    < > 6.8*  --   --    < >  --   --   --    < >  --    HEMOGLOBINA1  --   --   --   --   --   --   --   --   --   --   --   --  7.2*  --   --   --  6.5*   TSH  --   --  1.98  --   --  3.56  --   --  7.12*  --   --   --   --   --  5.02*  --   --    T4  --   --   --   --   --   --   --   --  0.87  --   --   --   --   --  0.97  --   --    LDL  --   --   --   --   --  89  --   --  117*  --   --   --   --   --   --   --   --    HDL  --   --   --   --   --  57  --   --  57  --   --   --   --   --   --   --   --    TRIG  --   --   --   --   --  131  --   --  191*  --   --   --   --   --   --   --   --    CR 1.17* 1.50*  --    < > 1.25* 1.37*  --    < > 1.51*   < >  --    < >  --    < > 1.59*   < >  --    MICROL  --   --   --   --   --   --  326  --   --   --  39  --   --   --   --    < >  --     < > = values in this interval not displayed.         Assessment and Plan:  Type 2 diabetes mellitus with stage 3 chronic kidney disease, with long-term current use of insulin (H)    Mariel is a 77 year old female with type II diabetes complicated by TIA, CHF, CAD status post CABG, COPD, thyroid cancer status post thyroidectomy and resultant hypothyroidism, obesity, depression, chronic pain and hypertension.    She is recently returned home after hospitalization last month for increased confusion, weakness and a fall.  She had a prolonged rehabilitation at [facility] where her diabetes was reasonably controlled with a GMI of 7.4% per smith in the last 2 weeks.  She is not having any hypoglycemia      Plan:  Continue Lantus 29 units each morning repaglinide 1 mg twice daily with each  meal.  Spent time discussing with Mariel and Odalis will need to scan the blood sugar in the morning when she wakes up and then before dinner or at bedtime to have a more continuous reading of what her blood sugars are.    Advised:    Please continue Lantus 29 units each morning and Repaglinide 1 mg with each meal.      Goals:  Fasting blood sugar 90 - 140  Bedtime blood sugar 100 - 180  No blood sugars <70  Rare prolonged blood sugars >200.  (i.e. >4 hours.)  No BG >350 (call if any prolonged)   Please call clinic if often not meeting goals.    Otherwise, I will see you in 3-6 months.    Obesity:  Overall her weight is up but I suspect this is due to fluid at this point.  This is not a primary concern.    HTN/CKD:  stable stage 3, with GFR stable Cr stable.  During admission she had severe hypertension and medications were adjusted.  She will continue to follow-up with nephrology.        DM Complications-   Mariel and Odalis again had a number of questions about diet and nutrition which we discussed at some length.  They defer a referral to nutrition at this point.     Retinopathy:   Most recent reported eye exam 6/10/2022 but have not yet seen that.  Nephropathy:  Yes, she will continue to see Dr. Warren rodriguez.  Neuropathy: Yes peripheral neuropathy.  She sees Dr. Fitzgerald in podiatry   feet: She sees Dr. Fitzgerald in podiatry   Lipids: Has CAD and is taking a statin.     Hypothyroidism: Her TSH was within normal range July 2022.  She will continue to take her current dose of levothyroxine.     Follow-up: Return in 4 months   34 minutes in preparation for visit reviewing chart, labs and documentation, visiting with patient gathering history and in exam, education and counseling, as well as coordination of care, further chart review and documentation following visit on this date of service and as alluded to documented above.  Extended time and education regarding concerns with hypoglycemia.      It is my privilege to be  involved in the care of the above patient.     Muriel Will PA-C, MPAS  [facility]  Diabetes, Endocrinology, and Metabolism  [phone] Appointments/Nurse  [phone] Fax  [phone] pager  [phone] nurse line

## 2022-08-10 ENCOUNTER — TELEPHONE (OUTPATIENT)
Dept: FAMILY MEDICINE | Facility: CLINIC | Age: 77
End: 2022-08-10

## 2022-08-10 NOTE — CONFIDENTIAL NOTE
"JODY Gautam from [facility] calling to update provider on medication regimen and approval for these modifications.    Repaglinide: prescribed three times daily, but is taking twice daily as patient \"sleeps in\" and does not eat breakfast.  Pt only eats two meals per day.     Tylenol was given per sig at TCU.  At home pt is taking 2 tabs of 500 mg three times daily.  Dose is equivalent.    Odalis is pt roommate and POA.  She has informed Dain that pt was weaned from trazodone prior to TCU stay.  Parties do not want it resumed.    KCL was previously being administered at 0800 and 1600.  Pt is now taking 1000 and 2000 due to her sleeping/eating schedule changes.     Caller wants confirmation that these changes are ok.    Secure voicemail; OK to leave detailed message. [phone]    Routed to PCP, cardiology, and endocrinology.    CHEN Lopez RN  [facility]        "

## 2022-08-11 NOTE — TELEPHONE ENCOUNTER
Malika,    Is virtual visit ok?    Candace Tello, RN, BSN  [facility]

## 2022-08-11 NOTE — TELEPHONE ENCOUNTER
Yes,     ,    Please call pt to schedule virtual visit to go over meds with Malika    Thank you,  Candace Tello RN  [facility]

## 2022-08-12 ENCOUNTER — TELEPHONE (OUTPATIENT)
Dept: FAMILY MEDICINE | Facility: CLINIC | Age: 77
End: 2022-08-12

## 2022-08-12 NOTE — TELEPHONE ENCOUNTER
Forms/Letter Request    Type of form/letter: Home Health Certification and Plan of Care    Have you been seen for this request: N/A    Do we have the form/letter: Yes: Form folder POD C    When is form/letter needed by: N/A    How would you like the form/letter returned: Fax

## 2022-08-13 NOTE — TELEPHONE ENCOUNTER
FUTURE VISIT INFORMATION      FUTURE VISIT INFORMATION:    Date: 10/12/2022    Time: 1040am    [place] [facility] [place]FORMATION:    Referring provider:  Malika CORNEJO CNP     Referring providers clinic:  [facility]     Reason for visit/diagnosis  Temporal Pain    RECORDS REQUESTED FROM:       Clinic name Comments Records Status Imaging Status   Internal JULIANA Wolf-5/31/2022, 11/23/2021    MR Brain-7/12/2022    CT Head-7/12/2022, 1/25/2022    CT Cervical Spine-1/25/2022 Epic PACS

## 2022-08-15 ENCOUNTER — VIRTUAL VISIT (OUTPATIENT)
Dept: FAMILY MEDICINE | Facility: CLINIC | Age: 77
End: 2022-08-15
Payer: MEDICARE

## 2022-08-15 DIAGNOSIS — R53.1 GENERALIZED WEAKNESS: ICD-10-CM

## 2022-08-15 DIAGNOSIS — R29.6 RECURRENT FALLS: Primary | ICD-10-CM

## 2022-08-15 DIAGNOSIS — N39.46 MIXED INCONTINENCE: ICD-10-CM

## 2022-08-15 DIAGNOSIS — R69 MULTIPLE COMORBID CONDITIONS: ICD-10-CM

## 2022-08-15 PROCEDURE — 99443 PR PHYSICIAN TELEPHONE EVALUATION 21-30 MIN: CPT | Mod: 95 | Performed by: NURSE PRACTITIONER

## 2022-08-15 RX ORDER — ESCITALOPRAM OXALATE 20 MG/1
TABLET ORAL
COMMUNITY
Start: 2022-07-13 | End: 2022-12-02

## 2022-08-15 NOTE — PROGRESS NOTES
Mariel is a 77 year old who is being evaluated via a billable telephone visit.      What phone number would you like to be contacted at? [phone]  How would you like to obtain your AVS? [place]    Assessment & Plan     Recurrent falls  High fall risk exacerbated by deconditioning.  She is progressing with home health services.  She would benefit from incontinence supplies and hospital bed as many of her falls result while getting out of bed.   - Incontinence Supplies Order for DME - ONLY FOR DME  - Hospital Bed Order for DME - ONLY FOR DME    Generalized weakness  - Incontinence Supplies Order for DME - ONLY FOR DME  - Hospital Bed Order for DME - ONLY FOR DME    Mixed incontinence  Follows with urology and working with OT.  Likely exacerbated by several days in bed with a pure-wick.  We discussed continuing to schedule toileting and continuing pelvic floor exercises.    - Incontinence Supplies Order for DME - ONLY FOR DME    Multiple comorbid conditions  Requesting referral to palliative and I think this would be reasonable given her multiple comorbidity and given the possibility she will continue to functionally decline.   - Palliative Care Referral; Future             Return for Annual wellness visit.    [facility]   Mariel is a 77 year old accompanied by her friend, presenting for the following health issues:  Telephone (SPEAKING TO [place]-CONSENT FOR COMMUNICATION VERIFIED) and Hospital F/U (7/12-7/16 HOSPITAL FALL/MOBILITY CONCERN, REHAB 7/16-8/15)      HPI     In June and July, not moving well, shuffling feet.  July 12, had a fall and ended up in the hospital.  She was pretty confused and she was admitted into observation for several days.  She was stuck in bed- confusion worsened even more and became very deconditioned.      Had a very thorough work up.      Went to TCU for 3 weeks.  She did get stronger, walking improved.      Blood sugars  and BP had been high initially at TCU, but have come back down.  Now getting home health and  OT/PT.    Big issues:    1. Incontinence- was given a pure wick for 4 days straight.  Working with OT.  Has seen urology in the past, tolterodine.     2.  Mariel's bed is wrong- slipped off it again.    3.  Wondering about palliative care.      4. Got new CPAP- Working well.      Review of Systems   Constitutional, HEENT, cardiovascular, pulmonary, gi and gu systems are negative, except as otherwise noted.      Objective           Vitals:  No vitals were obtained today due to virtual visit.    Physical Exam   alert and no distress  PSYCH: Alert and oriented times 3; coherent speech, normal   rate and volume, able to articulate logical thoughts, able   to abstract reason, no tangential thoughts, no hallucinations   or delusions  Her affect is normal  RESP: No cough, no audible wheezing, able to talk in full sentences  Remainder of exam unable to be completed due to telephone visits                Phone call duration: 21 minutes    .  ..

## 2022-08-17 ENCOUNTER — TELEPHONE (OUTPATIENT)
Dept: FAMILY MEDICINE | Facility: CLINIC | Age: 77
End: 2022-08-17

## 2022-08-17 NOTE — TELEPHONE ENCOUNTER
Detailed message left on Debbi's identified voicemail giving verbal authorization for requested home care orders.    SONIA RobertsN, RN  [facility]

## 2022-08-17 NOTE — TELEPHONE ENCOUNTER
Reason for Call: Other    Detailed comments: [facility] is requesting -  OT 2 x week for 4 weeks    Phone Number Patient can be reached at: [phone]    Best Time: Any    Can we leave a detailed message on this number? YES    Call taken on 8/17/2022 at 12:49 PM by Korin Castro

## 2022-08-18 ENCOUNTER — PATIENT OUTREACH (OUTPATIENT)
Dept: CARE COORDINATION | Facility: CLINIC | Age: 77
End: 2022-08-18

## 2022-08-18 ENCOUNTER — CARE COORDINATION (OUTPATIENT)
Dept: CARDIOLOGY | Facility: CLINIC | Age: 77
End: 2022-08-18

## 2022-08-18 NOTE — PROGRESS NOTES
[facility] - CardioMEMS Reading Review    August 18, 2022   CardioMEMS reviewed.  Current diuretic: Bumex 4 mg daily  Recent plan of care changes: none. Has been inpatient and at TCU. Now back home and sent first mems reading this week which is within goal range.     Current Threshold parameters:       Today's Waveform:       Readings:           RHC Date Wedge Pressure MEMS PAD during cath  (average of the 3 values)     7/1/2019 w/MEMS implant     20 mmHg   18 mmHg           Rosina Mays RN

## 2022-08-18 NOTE — PROGRESS NOTES
Clinic Care Coordination Contact  OUTREACH    Referral Information:  Referral Source: Care Team  Primary Diagnosis: Frailty/Failure to thrive    Chief Complaint   Patient presents with     Clinic Care Coordination - Follow-up     TCU Discharge     Clinic Care Coordination - Homecare/TCU     [facility]      Universal Utilization: 91% Risk of Admission or ED Visit.   Clinic Utilization  Difficulty keeping appointments:: No  Compliance Concerns: No  No-Show Concerns: No  No PCP office visit in Past Year: No  Utilization    Hospital Admissions  1             ED Visits  2             No Show Count (past year)  0                Current as of: 8/17/2022  7:26 PM            Clinical Concerns:  Current Medical Concerns:    Patient Active Problem List   Diagnosis     Hypothyroidism     Diverticulitis of colon     Coronary atherosclerosis     Myalgia and myositis     Migraine     Chronic airway obstruction/ COPD     Mononeuritis     Obstructive sleep apnea     Vitamin D deficiency     Hypertension goal BP (blood pressure) < 130/80     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)     Hyperlipidemia with target LDL less than 70     Chronic diastolic heart failure (H)     Osteoarthritis     Morbid obesity (H)     Arthritis of knee     Transient cerebral ischemia     History of thyroid cancer     Cervicalgia     Fatty liver disease, nonalcoholic     Hepatomegaly     Angina at rest (H)     S/P CABG x 3     Type 2 diabetes mellitus with stage 3 chronic kidney disease, with long-term current use of insulin (H)     Lymphedema     Lower back pain     Bilateral carotid artery disease (H)     Spinal stenosis of lumbar region, unspecified whether neurogenic claudication present     Status post coronary angiogram     Hemoptysis     Intertrigo     Malignant neoplasm of lower-inner quadrant of left breast in female, estrogen receptor positive (H)     Pain in the coccyx     Facet arthropathy     CKD (chronic kidney disease) stage 4, GFR  15-29 ml/min (H)     Facet arthropathy, lumbar     Temporal pain     Elevated C-reactive protein     Hypertensive chronic kidney disease with stage 5 chronic kidney disease or end stage renal disease (H)     Generalized weakness     Recurrent falls     Feels some point in the future mental health therapy might be useful for patient, however too much other stuff going on currently, no immediate concerns at this time.     Palliative Care referral #[phone] - RNCC provided contact information to schedule.     Odalis states that patient currently has a hospital bed with adjustable head, feet, and bed rails. The bed itself does not raise and lower and patient is slipping out of bed and looking at getting a new one. Odalis states they are looking into specific type/brand and found one on [employer], but not sure if patient's health insurance benefits cover hospital bed or not. RNCC explained process for obtaining DME hospital bed: provider visit, order, provider documentation of medical necessity, qualifying diagnosis, etc. Odalis states this seems like a hassle and plans to just obtain one without going through insurance. Odalis verbalizes plan to get home care physical and occupational therapy input as well and will need to figure out logistics of removing current bed and setting up new bed.      Odalis verbalizes feeling overdue health maintenance is not a current priority at this time due to so many other provider appointments, home care visits, etc.     Odalis verbalizes plan to hire private pay PCA for additional assistance when home care therapies ended and also verbalizes plans to look into lifeline that will alert emergency contact (her) if/as needed versus sending patient to the hospital, which would not always be necessary.       Current Behavioral Concerns: none noted.   Education Provided to patient: Care Coordination role, clinic after hours, medications, appointments, community resources and provider contact  information stated above.    Pain  Pain (GOAL):: Yes  Type: Chronic (>3mo)  Location of chronic pain:: coccyx  Radiating: No  Progression: Unchanged  Description of pain: Aching, Nagging  Chronic pain severity:: 5  Limitation of routine activities due to chronic pain:: Yes (limits walking due to pain)  Description: Able to do most things most days with some rest  Alleviating Factors: Rest, Ice, Heat, Exercise, Stretching, Procedures or Injections, Pain Medication  Aggravating Factors: Inactivity, Positioning  Health Maintenance Reviewed: Due/Overdue   Health Maintenance Due   Topic Date Due     MEDICARE ANNUAL WELLNESS VISIT  08/10/2017     DIABETIC FOOT EXAM  03/19/2019     HF ACTION PLAN  11/30/2021     MICROALBUMIN  04/20/2022     PHQ-9  05/23/2022     EYE EXAM  06/10/2022      Clinical Pathway: None    Medication Management:  Medication review status: Medications reviewed and no changes reported per patient.           Functional Status:  Dependent ADLs:: Bathing, Dressing  Dependent IADLs:: Medication Management, Money Management, Cleaning, Cooking, Laundry, Shopping, Transportation, Meal Preparation  Bed or wheelchair confined:: No  Mobility Status: Independent w/Device  Fallen 2 or more times in the past year?: Yes  Any fall with injury in the past year?: Yes    Living Situation:  Current living arrangement:: Other, I live in a private home (i live in a house with a roommate)  Type [facility]    Lifestyle & Psychosocial Needs:    Social Determinants of Health     Tobacco Use: Medium Risk     Smoking Tobacco Use: Former Smoker     Smokeless Tobacco Use: Never Used   Alcohol Use: Not on file   Financial Resource Strain: Not on file   Food Insecurity: Not on file   Transportation Needs: Not on file   Physical Activity: Not on file   Stress: Not on file   Social Connections: Not on file   Intimate Partner Violence: Not on file   Depression: Not at risk     PHQ-2 Score: 0    Housing Stability: Not on file     Diet:: Diabetic diet  Inadequate nutrition (GOAL):: No  Tube Feeding: No  Inadequate activity/exercise (GOAL):: No  Significant changes in sleep pattern (GOAL): No  Transportation means:: Friend, Regular car  Samaritan or spiritual beliefs that impact treatment:: No  Mental health DX:: Yes  Mental health DX how managed:: Medication  Mental health management concern (GOAL):: No  Chemical Dependency Status: No Current Concerns  Chemical Dependency Management:  (NA)  Informal Support system:: Friends      Resources and Interventions:  Current Resources:   Skilled Home Care Services: Skilled Nursing, Home Health Aid, Physicial Therapy, Occupational Therapy  Community Resources: [facility], Home Care, DME, Palliative Care  Supplies Currently Used at Home: Diabetic Supplies, Incontinence Supplies, Compression Stockings  Equipment Currently Used at Home: cane, straight, walker, rolling, hospital bed, grab bar, tub/shower, shower chair  Employment Status: retired  Advance Care Plan/Directive  Advanced Care Plans/Directives on file:: Yes  Type Advanced Care Plans/Directives: Advanced Directive - On File  Advanced Care Plan/Directive Status: Not Applicable    Referrals Placed: Lifeline, PCA     Care Plan:  Care Plan: 1. Community Resources     Problem: Insufficient In-home support     Long-Range Goal: Establish adequate home support     Start Date: 8/18/2022 Expected End Date: 12/30/2022    Note:     Barriers: diagnosis of multiple, chronic, complex medical conditions, provider availability - wait time to complete appointments, etc.   Strengths: motivated, engaged in care coordination, friend supportive, participates in home care therapies  Patient expressed understanding of goal: Yes  Action steps to achieve this goal:  1. I will review the following resources for [facility]:     One Call alert: #6-580-234-5282  [email or web address]  Landline-$24.95, Wireless-  34.95    Mobile Help: #0-495-324-8139     Medical Alert: #9-809-721-8497    Medical Guardian: #[phone]    [place] Lifeline: #7-993-521-6050 Life Alert   https://www.lifeline.minesh.com/safety-solutions.html  Landline- $30.00 button; Auto Alert $45.00 fall    2. I will review resources for private pay caregiver assistance if/as needed:     [employer]: #[phone]    [facility] [employer] [facility]: #[phone]/#[phone]    Home Instead: #[phone]    [employer]: #[phone]  3. I will work with home care therapies to discuss DME/home modification recommendations for safely remaining in my home independently.   4. I will discuss, review, schedule and complete recommended overdue health maintenance with my Primary Care Provider.   5. I will contact my care team with questions, concerns, support needs. I will use the clinic as a resource and I understand I can contact my clinic with 24/7 after hours services available. Care Coordinator will remain available as needed.      *ANNUAL ASSESSMENT DUE: 08/2023                        Patient/Caregiver understanding: Patient/caregiver verbalized understanding and denies any additional questions or concerns at this time. RNCC engaged in [employer] communications during encounter.      Outreach Frequency: monthly  Future Appointments              In 3 weeks [facility]    In 3 weeks Gwendolyn Mobley PA-C [facility]    In 1 month Opal Weber MD [facility]    In 1 month Bharat Fitzgerald DPM [facility]    In 1 month Paula Hdz APRN CNP [facility]    In 2 months [facility]    In 2 months Magdalena Hall PA-C [facility]    In 3 months Suman  Muriel CASTANON PA-C [facility]        Plan: RNCC will send new patient centered plan of care to patient/caregiver via [employer]. Patient/caregiver will call RNCC with questions, concerns, support needs. RNCC will be available as needed.  RNCC will follow up with patient/caregiver again in 1 month.     Radha Byrne RN Care Coordinator  [facility] [place] [place], [place]  Email: [email or web address]  Phone: [phone]

## 2022-08-18 NOTE — LETTER
[facility] CARE COORDINATION  [address]  [place] [address]     August 18, 2022        Mariel Salasing  [address] [place] [address]          Dear Zonia Floyd is an updated Patient Centered Plan of Care for your continued enrollment in Care Coordination. Please let us know if you have additional questions, concerns, or goals that we can assist with.    Sincerely,    Radha Byrne RN Care Coordinator  [facility] [place], [place], [place]  Email: [email or web address]  Phone: [phone]                  [facility]  Patient Centered Plan of Care  About Me:        Patient Name:  Mariel Ribeiro    YOB: 1945  Age:         77 year old   [place] MRN:    [id number] Telephone Information:  Home Phone [phone]   Mobile [phone]       Address:  [address] [place] [address] Email address:  [email or web address]      Emergency Contact(s)    Name Relationship Lgl Grd Work Phone Home Phone Mobile Phone   1. KOSTAS PIERSON Roommate No [phone]   2. VITA ODOM Sister No  [phone]   3. NASIRSAKSHI MUNIZ Friend No  [phone]   4. KOSTAS NAJERA Friend    [phone]           Primary language:  English     needed? No   [place] [facility]:  [phone] op. 1  Other communication barriers:Glasses    Preferred Method of Communication:  Maryt  Current living arrangement: Other; I live in a private home (i live in a house with a roommate)    Mobility Status/ Medical Equipment: Independent w/Device    Health Maintenance  Health Maintenance Reviewed: Due/Overdue   Health Maintenance Due   Topic Date Due     MEDICARE ANNUAL WELLNESS VISIT  08/10/2017     DIABETIC FOOT EXAM  03/19/2019     HF ACTION PLAN  11/30/2021     MICROALBUMIN  04/20/2022     PHQ-9  05/23/2022     EYE EXAM  06/10/2022      My Access Plan  Medical Emergency 911   Primary Clinic  Line [facility] - [phone]   24 Hour Appointment Line [phone] or  [phone]) (toll-free)   24 Hour Nurse Line [phone] (toll-free)   Preferred Urgent Care [facility], [phone]     Preferred Hospital [facility]  [phone]     Preferred Pharmacy [id number] - [place], [place] - [address] AT [facility] [address]     Behavioral Health Crisis Line The National Suicide Prevention Lifeline at 1-501.566.5034 or Text/Call 488     My Care Team Members  Patient Care Team       Relationship Specialty Notifications Start End    Malika Wolf, APRN CNP PCP - General Nurse Practitioner Primary Care  2/7/22     Phone: [phone] Fax: [phone]         [address] [place] [address]    Zeeshan Hutson MD MD Orthopedics  9/8/14      XXX RETIRED  [facility] [address] [facility] [address]    Jefry Hanson DPM  Podiatry  9/8/14     Phone: [phone] Fax: [phone]         [address] [facility] [address]    Tom Cruz MD MD Neurology  5/12/15     Phone: [phone] Fax: [phone]         [address] [place], [place] [id number] [facility] [address]    Rosina Kohler MD MD Family Medicine - Sports Medicine  11/21/16     Phone: [phone] Fax: [phone]         [address] [place] [address]    Jeffrey Roberson MD MD General Surgery  5/22/17     Phone: [phone] Fax: [phone]         [address] [place] [address] [address] [facility] [address]    Stephanie Wolf MD MD Cardiology  7/2/18     Phone: [phone] Fax: [phone]         [address] [place] [address] [facility] [address]    Cathi Vaughn APRN CNP Nurse Practitioner Cardiology Admissions 8/24/18     [facility]    Phone: [phone] Pager: [phone] Fax: [phone] [address] [facility] [address]     Rosina Mays, RN Nurse Coordinator Cardiology Admissions 8/24/18     [facility]    Phone: [phone] Fax: [phone]        Shayna Newell, RN Specialty Care Coordinator Cardiology Admissions 3/13/19     Phone: [phone]         Magdalena Hall PA-C Physician Assistant Cardiology Admissions 12/10/19     [facility]    Phone: [phone] Fax: [phone]         [address] [facility] [address]    Radha Byrne, RN Lead Care Coordinator  Admissions 4/6/20     Phone: [phone]         Lesly Cobos, RN Specialty Care Coordinator Breast Oncology Admissions 8/23/20     Phone: [phone] Pager: [phone]        Opal Weber MD MD Breast Oncology Admissions 8/23/20     Phone: [phone] Fax: [phone]         [facility] [address]    Opal Weber MD Assigned Cancer Care Provider   10/23/20     Phone: [phone] Fax: [phone]         [facility] [address]    Dahlia Wolfe PA-C Physician Assistant Urology  12/22/20     Phone: [phone] Fax: [phone] [address] [address]    Radha Llanos, RN Diabetes Educator Diabetes Education  1/8/21     Phone: [phone] Fax: [phone]         [address] [address]    Muriel Will PA-C Physician Assistant Endocrinology, Diabetes, and Metabolism  2/10/21     Phone: [phone] Fax: [phone]         [address] [facility] [address]    Edvin Greene MD Assigned Neuroscience Provider   3/17/21     Muriel Will PA-C Assigned Endocrinology Provider   3/17/21     Phone: [phone] Fax: [phone]         [address] [facility] [address]    Dahlia Wolfe PA-C Assigned OBGYN Provider   11/21/21     Phone: [phone] Fax: [phone] [address] [facility] [address]    Malika Wolf APRN CNP Assigned PCP   3/27/22     Phone: [phone] Fax: [phone]          [address] [place] [address]    Shreyas Cole, PhD Assigned Behavioral Health Provider   4/17/22     Phone: [phone] Fax: [phone]         [address] [place] [address]    Paula Hdz APRN CNP Nurse Practitioner Neurology  6/7/22     Phone: [phone] Fax: [phone]         [address] [facility] [address]    Magdalena Hall PA-C Assigned Heart and Vascular Provider   6/25/22     Phone: [phone] Fax: [phone]         [facility] [address]    Jefry Ramos MD Assigned Surgical Provider   6/25/22     Phone: [phone] Fax: [phone]         6 [facility] [address]    Bharat Fitzgerald DPM Assigned Musculoskeletal Provider   7/9/22     Phone: [phone] Fax: [phone]         [address] [place] [address]    Allison Prather MD Assigned Pulmonology Provider   7/30/22     Phone: [phone] Fax: [phone]         [address] [place], [place] [address] [address] [address]            My Care Plans  Self Management and Treatment Plan  Care Plan  Care Plan: 1. Community Resources     Problem: Insufficient In-home support     Goal: Establish adequate home support                        Action Plans on File:            Depression          Advance Care Plans/Directives Type:   Advanced Directive - On File      My Medical and Care Information  Problem List   Patient Active Problem List   Diagnosis     Hypothyroidism     Diverticulitis of colon     Coronary atherosclerosis     Myalgia and myositis     Migraine     Chronic airway obstruction/ COPD     Mononeuritis     Obstructive sleep apnea     Vitamin D deficiency     Hypertension goal BP (blood pressure) < 130/80     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)     Hyperlipidemia with target LDL less than 70     Chronic diastolic heart failure (H)     Osteoarthritis     Morbid obesity (H)     Arthritis of knee     Transient cerebral ischemia      History of thyroid cancer     Cervicalgia     Fatty liver disease, nonalcoholic     Hepatomegaly     Angina at rest (H)     S/P CABG x 3     Type 2 diabetes mellitus with stage 3 chronic kidney disease, with long-term current use of insulin (H)     Lymphedema     Lower back pain     Bilateral carotid artery disease (H)     Spinal stenosis of lumbar region, unspecified whether neurogenic claudication present     Status post coronary angiogram     Hemoptysis     Intertrigo     Malignant neoplasm of lower-inner quadrant of left breast in female, estrogen receptor positive (H)     Pain in the coccyx     Facet arthropathy     CKD (chronic kidney disease) stage 4, GFR 15-29 ml/min (H)     Facet arthropathy, lumbar     Temporal pain     Elevated C-reactive protein     Hypertensive chronic kidney disease with stage 5 chronic kidney disease or end stage renal disease (H)     Generalized weakness     Recurrent falls      Current Medications and Allergies:    Allergies   Allergen Reactions     Contrast Dye Anaphylaxis     Fish Allergy Anaphylaxis     Iodine Anaphylaxis     Oxycodone Other (See Comments)     Severe suicidal tendencies on this medication     Tree Nuts [Nuts] Anaphylaxis     Tree nuts only (peanuts ok)     Bactrim      Increased uric acid     Betadine [Povidone Iodine] Hives, Swelling and Difficulty breathing     Betadine     Combivent      Rash     Dulaglutide Other (See Comments)     Hx . Of thyroid cancer.     Fish      Lisinopril Other (See Comments)     Scr/grf severely reduced.      Penicillins Rash     Allopurinol Rash     Latex Rash     Wool Fiber Rash      Current Outpatient Medications:      acetaminophen (TYLENOL) 325 MG tablet, Take 3 tablets (975 mg) by mouth every 8 hours, Disp: , Rfl:      albuterol (PROAIR HFA/PROVENTIL HFA/VENTOLIN HFA) 108 (90 Base) MCG/ACT inhaler, INHALE TWO PUFFS INTO LUNGS EVERY SIX HOURS, Disp: 25.5 g, Rfl: 9     anastrozole (ARIMIDEX) 1 MG tablet, Take 1 tablet (1 mg) by  mouth daily, Disp: 90 tablet, Rfl: 3     aspirin (ASA) 81 MG EC tablet, Take 1 tablet (81 mg) by mouth daily, Disp: , Rfl:      atorvastatin (LIPITOR) 40 MG tablet, Take 1 tablet (40 mg) by mouth in the morning., Disp: 90 tablet, Rfl: 3     blood glucose (ONETOUCH ULTRA) test strip, Use to test blood sugar four times daily or as directed., Disp: 3 Box, Rfl: 3     bumetanide (BUMEX) 1 MG tablet, 4 mg daily, Disp: 360 tablet, Rfl: 3     Continuous Blood Gluc  (FREESTYLE MARTY 14 DAY READER) JEZ, 1 Units 4 times daily (before meals and nightly), Disp: 1 Device, Rfl: 0     Continuous Blood Gluc Sensor (FREESTYLE MARTY 14 DAY SENSOR) [facility], PLACE NEW SENSOR TO BACK OF ARM EVERY 14 DAYS TO MONITOR BLOOD SUGARS, Disp: 2 each, Rfl: 4     Continuous Blood Gluc Sensor (FREESTYLE MARTY 14 DAY SENSOR) [facility], Place new sensor to back of arm q14 days to monitor blood sugars, Disp: 6 each, Rfl: 3     DULoxetine (CYMBALTA) 30 MG capsule, Take 1 capsule (30 mg) by mouth 2 times daily, Disp: 180 capsule, Rfl: 1     EPINEPHrine (ANY BX GENERIC EQUIV) 0.3 MG/0.3ML injection 2-pack, Inject 0.3 mLs (0.3 mg) into the muscle once as needed for anaphylaxis, Disp: 0.6 mL, Rfl: 3     escitalopram (LEXAPRO) 20 MG tablet, , Disp: , Rfl:      ferrous gluconate (FERGON) 324 (38 Fe) MG tablet, TAKE 1 TABLET BY MOUTH EVERY MONDAY, WEDNESDAY, AND FRIDAY MORNING, Disp: 36 tablet, Rfl: 2     folic acid (FOLVITE) 1 MG tablet, Take 2 tablets (2 mg) by mouth daily, Disp: , Rfl:      guaiFENesin (MUCINEX) 600 MG 12 hr tablet, Take 1 tablet (600 mg) by mouth 2 times daily, Disp: , Rfl:      insulin glargine (LANTUS SOLOSTAR) 100 UNIT/ML pen, Inject 29 Units Subcutaneous daily, Disp: 15 mL, Rfl: 3     levothyroxine (SYNTHROID) 150 MCG tablet, Take 1 tablet (150 mcg) by mouth daily, Disp: 90 tablet, Rfl: 2     LIFESCAN FINEPOINT LANCETS MISC, Use to test blood sugars 2 times daily or as directed., Disp: 100 each, Rfl: prn     losartan (COZAAR) 50 MG  "tablet, TAKE 1/2 TABLET(25 MG) BY MOUTH DAILY, Disp: 45 tablet, Rfl: 0     metoprolol succinate ER (TOPROL-XL) 25 MG 24 hr tablet, Take 0.5 tablets (12.5 mg) by mouth every morning AND 1 tablet (25 mg) every evening., Disp: 135 tablet, Rfl: 3     miconazole (MICATIN/MICRO GUARD) 2 % external powder, Apply topically as needed for itching or other Redness in bilateral groin and  clef, Disp: 43 g, Rfl: 0     niacin ER (NIASPAN) 500 MG CR tablet, TAKE 1 TABLET(500 MG) BY MOUTH TWICE DAILY, Disp: 180 tablet, Rfl: 1     nitroGLYcerin (NITROSTAT) 0.4 MG sublingual tablet, For chest pain place 1 tablet under the tongue every 5 minutes for 3 doses. If symptoms persist 5 minutes after 1st dose call 911., Disp: 25 tablet, Rfl: 1     nystatin POWD, 1 Dose 4 times daily, Disp: 1 each, Rfl: 1     ONE TOUCH ULTRA (DEVICES) MISC, test blood sugar BID, Disp: 1, Rfl: 0     potassium chloride ER (KLOR-CON M) 10 MEQ CR tablet, Take 2 tablets (20 mEq) by mouth 2 times daily, Disp: 360 tablet, Rfl: 3     pregabalin (LYRICA) 100 MG capsule, Take 1 capsule (100 mg) by mouth 2 times daily, Disp: 60 capsule, Rfl: 0     repaglinide (PRANDIN) 1 MG tablet, Take 1 tablet (1 mg) by mouth 3 times daily (before meals), Disp: 180 tablet, Rfl: 3     Skin Protectants, Misc. ([facility] AG TEXTILE 10\"X144\") SHEE, Externally apply 1 Box topically daily Apply to areas of intertrigo prn, Disp: 1 each, Rfl: 3     tolterodine ER (DETROL LA) 2 MG 24 hr capsule, TAKE 2 CAPSULES(4 MG) BY MOUTH DAILY, Disp: 180 capsule, Rfl: 0  No current facility-administered medications for this visit.    Facility-Administered Medications Ordered in Other Visits:      sodium chloride (PF) 0.9% PF flush 10 mL, 10 mL, Intracatheter, Once, Starla Saez NP      Care Coordination Start Date: 2020   Frequency of Care Coordination: monthly     Form Last Updated: 2022     "

## 2022-08-23 ENCOUNTER — TELEPHONE (OUTPATIENT)
Dept: FAMILY MEDICINE | Facility: CLINIC | Age: 77
End: 2022-08-23

## 2022-08-23 NOTE — TELEPHONE ENCOUNTER
Forms/Letter Request    Type of form/letter: POLST    Have you been seen for this request: Yes     Do we have the form/letter: Yes: Luciano form folder pod b     When is form/letter needed by: ASAP     How would you like the form/letter returned: Fax

## 2022-08-25 ENCOUNTER — TELEPHONE (OUTPATIENT)
Dept: FAMILY MEDICINE | Facility: CLINIC | Age: 77
End: 2022-08-25

## 2022-08-25 ENCOUNTER — VIRTUAL VISIT (OUTPATIENT)
Dept: FAMILY MEDICINE | Facility: CLINIC | Age: 77
End: 2022-08-25
Payer: MEDICARE

## 2022-08-25 ENCOUNTER — CARE COORDINATION (OUTPATIENT)
Dept: CARDIOLOGY | Facility: CLINIC | Age: 77
End: 2022-08-25

## 2022-08-25 DIAGNOSIS — L53.9 ERYTHEMA OF SKIN: Primary | ICD-10-CM

## 2022-08-25 PROCEDURE — 99213 OFFICE O/P EST LOW 20 MIN: CPT | Mod: 95 | Performed by: FAMILY MEDICINE

## 2022-08-25 NOTE — TELEPHONE ENCOUNTER
"RN from [facility] calling  She has two red areas on right lower leg , raised, warm, not open, feels a hard center like a pimple or bug bite, Not sure when they appeared. One is 4 by 3.5 cm and the other is 6 by 6 cm  No open areas  Afebrile at 97.5  Not painful, not itchy \"holmens sign negative , pedal pulses \" ok per homecare RN  Pt is sleeping, keeps nodding off during visit and RN has to tell her to wake up, this is not typical  Covid test which is negative  \BS normal 185 after lunch, eating ok  /83  She can walk around with a cane    Virtual visit given today with dr Maxwell in her PCP's absence    Candace Tello RN, BSN  [facility]           "

## 2022-08-25 NOTE — PROGRESS NOTES
Mariel is a 77 year old who is being evaluated via a billable video visit.      How would you like to obtain your AVS? MyChart  If the video visit is dropped, the invitation should be resent by: Text to cell phone: [phone]  Will anyone else be joining your video visit? Odalis          Assessment & Plan     Erythema of skin  Erythematous macules of skin of right lower leg.  These are reported to be firm to the touch (i.e., indurated).  Ddx includes cellulitis, bug bites, erythema nodosum.  Most likely bug bites given central papule noted by Odalis (although I could not appreciate this on video platform).  She has no new meds or infectious symptoms to suggest EM.  I instructed patient and Odalis to monitor for signs/symptoms of cellulitis (including expanding area of redness and fever) and to notify clinic if that develops.    No follow-ups on file.    Rafaela Maxwell MD  [facility]        Subjective   Mariel is a 77 year old accompanied by her roommate/friend Odalis, presenting for the following health issues:  Video Visit and Derm Problem (Right leg, two red raised bumps, hardness, no pain, increased lethargy, blood sugar level at 178, COVID test negative 8/25)      HPI     Home care RN came out today and removed her leg wraps and noticed 2 red bumps on her right lower leg.  Not itchy or painful but with mild tenderness to palpation and feel firm to the touch.  BG usually runs 110-150.  Was 178 right after lunch. Now its 154.  (Last ate 2 hours.)  Didn't take her daily lantus yet today.  Usually takes that at 1:00 pm (and plans to do so after our visit).  No new meds or antibiotics.  She has had some increase in fatigue today.    No fever or sore throat.  (Home care RN took temp and it was normal.)  Chronic pain and chronic cough - unchanged.        Review of Systems   as above       Objective         Vitals:  No vitals were obtained today due to virtual visit.    Physical Exam  "  GENERAL: obese older woman sitting in wheel chair  RESP: No audible wheeze, cough, or visible cyanosis.  No visible retractions or increased work of breathing.   SKIN: right lower leg with 2 discrete annular red lesions.  One is on the lateral side of the upper calf and is about 1\" in diameter, the other is on the medial side of the calf and is about 2\" in diameter.  These appear to have a dusky erythema.  Odalis states they have a central bump.            Video-Visit Details    Video Start Time: 2:15 PM    Type of service:  Video Visit    Video End Time:2:30 PM    Originating Location (pt. Location): Home    Distant Location (provider location):  [facility]     Platform used for Video Visit: Guy    .  ..  "

## 2022-08-25 NOTE — PROGRESS NOTES
[facility] - CardioMEMS Reading Review    August 25, 2022   CardioMEMS reviewed.  Current diuretic: Bumex 4 mg daily  Recent plan of care changes: none. PAD in goal range.     Current Threshold parameters:       Today's Waveform:       Readings:           RHC Date Wedge Pressure MEMS PAD during cath  (average of the 3 values)     7/1/2019 w/MEMS implant     20 mmHg   18 mmHg           Rosina Mays RN

## 2022-08-31 ENCOUNTER — ALLIED HEALTH/NURSE VISIT (OUTPATIENT)
Dept: CARDIOLOGY | Facility: CLINIC | Age: 77
End: 2022-08-31
Attending: NURSE PRACTITIONER
Payer: MEDICARE

## 2022-08-31 DIAGNOSIS — I50.32 CHRONIC DIASTOLIC HEART FAILURE (H): Primary | ICD-10-CM

## 2022-09-01 NOTE — PROGRESS NOTES
Patient was admitted to TCU during this billing period. Has not sent enough readings for this to be a billable encounter.     Rosina Mays RN

## 2022-09-02 ENCOUNTER — CARE COORDINATION (OUTPATIENT)
Dept: CARDIOLOGY | Facility: CLINIC | Age: 77
End: 2022-09-02

## 2022-09-02 DIAGNOSIS — D50.8 IRON DEFICIENCY ANEMIA SECONDARY TO INADEQUATE DIETARY IRON INTAKE: ICD-10-CM

## 2022-09-02 DIAGNOSIS — I50.32 CHRONIC DIASTOLIC HEART FAILURE (H): Chronic | ICD-10-CM

## 2022-09-02 RX ORDER — FERROUS GLUCONATE 324(38)MG
TABLET ORAL
Qty: 36 TABLET | Refills: 0 | Status: SHIPPED | OUTPATIENT
Start: 2022-09-02 | End: 2023-01-18

## 2022-09-02 NOTE — TELEPHONE ENCOUNTER
Prescription approved per [facility] Refill Protocol.  CHEN Lopez RN  [facility]

## 2022-09-02 NOTE — PROGRESS NOTES
[facility] - CardioMEMS Reading Review    September 2, 2022   CardioMEMS reviewed.  Current diuretic: Bumex 4 mg daily  Recent plan of care changes: none. PAD in goal range.     Current Threshold parameters:       Today's Waveform:       Readings:           RHC Date Wedge Pressure MEMS PAD during cath  (average of the 3 values)     7/1/2019 w/MEMS implant     20 mmHg   18 mmHg           Rosina Mays RN

## 2022-09-04 ENCOUNTER — LAB REQUISITION (OUTPATIENT)
Dept: LAB | Facility: CLINIC | Age: 77
End: 2022-09-04
Payer: MEDICARE

## 2022-09-04 DIAGNOSIS — N39.0 URINARY TRACT INFECTION, SITE NOT SPECIFIED: ICD-10-CM

## 2022-09-04 LAB
ALBUMIN UR-MCNC: 100 MG/DL
APPEARANCE UR: ABNORMAL
BACTERIA #/AREA URNS HPF: ABNORMAL /HPF
BILIRUB UR QL STRIP: NEGATIVE
COLOR UR AUTO: YELLOW
GLUCOSE UR STRIP-MCNC: NEGATIVE MG/DL
HGB UR QL STRIP: ABNORMAL
HYALINE CASTS: 15 /LPF
KETONES UR STRIP-MCNC: NEGATIVE MG/DL
LEUKOCYTE ESTERASE UR QL STRIP: ABNORMAL
MUCOUS THREADS #/AREA URNS LPF: PRESENT /LPF
NITRATE UR QL: NEGATIVE
PH UR STRIP: 5.5 [PH] (ref 5–7)
RBC URINE: 22 /HPF
SP GR UR STRIP: 1.02 (ref 1–1.03)
SQUAMOUS EPITHELIAL: 3 /HPF
TRANSITIONAL EPI: 1 /HPF
UROBILINOGEN UR STRIP-MCNC: <2 MG/DL
WBC CLUMPS #/AREA URNS HPF: PRESENT /HPF
WBC URINE: >182 /HPF

## 2022-09-04 PROCEDURE — 81001 URINALYSIS AUTO W/SCOPE: CPT | Mod: ORL | Performed by: INTERNAL MEDICINE

## 2022-09-04 PROCEDURE — 87186 SC STD MICRODIL/AGAR DIL: CPT | Mod: ORL | Performed by: INTERNAL MEDICINE

## 2022-09-06 ENCOUNTER — TELEPHONE (OUTPATIENT)
Dept: FAMILY MEDICINE | Facility: CLINIC | Age: 77
End: 2022-09-06

## 2022-09-06 LAB — BACTERIA UR CULT: ABNORMAL

## 2022-09-06 NOTE — TELEPHONE ENCOUNTER
Reason for Call:  Other call back    Detailed comments: [facility] states patient was prescribed Keflex for active infection from UA results.  Red spots on R leg, blotches getting bigger, concern for cellulitis in this leg.  Swelling and more pain, 98.6 today, warm to touch.    Continue Keflex or switch?    Phone Number Patient can be reached at: Krissy . [phone]    Best Time: Any    Can we leave a detailed message on this number? YES    Call taken on 9/6/2022 at 11:12 AM by Korin Castro

## 2022-09-06 NOTE — TELEPHONE ENCOUNTER
insulin glargine (LANTUS SOLOSTAR) 100 UNIT/ML pen        Last Written Prescription Date:  12/03/19  Last Fill Quantity: 15mL,   # refills: 3  Last Office Visit : 12/03/19  Future Office visit:  None scheduled    Routing refill request to provider for review/approval because:  Insulin - refilled per clinic           Additional Anesthesia Volume In Cc: 6

## 2022-09-06 NOTE — TELEPHONE ENCOUNTER
Malika-    See TC message below.    I called pt and friend Odalis was there as well.     Keflex was prescribed by a provider from [facility].    It does looks like from 8/25 virtual visit with Dr. Maxwell, pt was experiencing erythema of this RLE, now it is more blotchy and spread to all over this R leg, versus 2 bumps noted in virtual visit.      It has not spread anywhere else on the body.     No fever. Odalis notes pts temp on Sat was 99.6. Today it was 97.4.    They are wondering if this could be worsening due to the Keflex and if pt should stop taking the medication? Or turning more into cellulitis and pt should be seen?     [phone]; ok to leave detailed message.    Casie Rosas, SONIAN RN  [facility]

## 2022-09-07 ENCOUNTER — OFFICE VISIT (OUTPATIENT)
Dept: URGENT CARE | Facility: URGENT CARE | Age: 77
End: 2022-09-07
Payer: MEDICARE

## 2022-09-07 VITALS
SYSTOLIC BLOOD PRESSURE: 134 MMHG | TEMPERATURE: 97.1 F | HEART RATE: 83 BPM | OXYGEN SATURATION: 96 % | RESPIRATION RATE: 16 BRPM | DIASTOLIC BLOOD PRESSURE: 76 MMHG

## 2022-09-07 DIAGNOSIS — N30.01 ACUTE CYSTITIS WITH HEMATURIA: ICD-10-CM

## 2022-09-07 DIAGNOSIS — L03.115 CELLULITIS OF RIGHT LOWER EXTREMITY: Primary | ICD-10-CM

## 2022-09-07 DIAGNOSIS — N18.32 CHRONIC KIDNEY DISEASE, STAGE 3B (H): Primary | ICD-10-CM

## 2022-09-07 DIAGNOSIS — N18.32 CHRONIC KIDNEY DISEASE, STAGE 3B (H): ICD-10-CM

## 2022-09-07 DIAGNOSIS — R30.0 DYSURIA: ICD-10-CM

## 2022-09-07 LAB
ALBUMIN MFR UR ELPH: 19.6 MG/DL
ALBUMIN UR-MCNC: NEGATIVE MG/DL
APPEARANCE UR: ABNORMAL
BACTERIA #/AREA URNS HPF: ABNORMAL /HPF
BASOPHILS # BLD AUTO: 0 10E3/UL (ref 0–0.2)
BASOPHILS NFR BLD AUTO: 0 %
BILIRUB UR QL STRIP: NEGATIVE
COLOR UR AUTO: YELLOW
CREAT UR-MCNC: 28.5 MG/DL
EOSINOPHIL # BLD AUTO: 0.3 10E3/UL (ref 0–0.7)
EOSINOPHIL NFR BLD AUTO: 3 %
ERYTHROCYTE [DISTWIDTH] IN BLOOD BY AUTOMATED COUNT: 13 % (ref 10–15)
GLUCOSE UR STRIP-MCNC: NEGATIVE MG/DL
HCT VFR BLD AUTO: 42.2 % (ref 35–47)
HGB BLD-MCNC: 13.4 G/DL (ref 11.7–15.7)
HGB UR QL STRIP: ABNORMAL
IMM GRANULOCYTES # BLD: 0 10E3/UL
IMM GRANULOCYTES NFR BLD: 0 %
KETONES UR STRIP-MCNC: NEGATIVE MG/DL
LEUKOCYTE ESTERASE UR QL STRIP: ABNORMAL
LYMPHOCYTES # BLD AUTO: 2.3 10E3/UL (ref 0.8–5.3)
LYMPHOCYTES NFR BLD AUTO: 24 %
MCH RBC QN AUTO: 29.6 PG (ref 26.5–33)
MCHC RBC AUTO-ENTMCNC: 31.8 G/DL (ref 31.5–36.5)
MCV RBC AUTO: 93 FL (ref 78–100)
MONOCYTES # BLD AUTO: 0.7 10E3/UL (ref 0–1.3)
MONOCYTES NFR BLD AUTO: 7 %
NEUTROPHILS # BLD AUTO: 6.4 10E3/UL (ref 1.6–8.3)
NEUTROPHILS NFR BLD AUTO: 65 %
NITRATE UR QL: NEGATIVE
PH UR STRIP: 5.5 [PH] (ref 5–7)
PLATELET # BLD AUTO: 162 10E3/UL (ref 150–450)
PROT/CREAT 24H UR: 0.69 MG/MG CR (ref 0–0.2)
RBC # BLD AUTO: 4.53 10E6/UL (ref 3.8–5.2)
RBC #/AREA URNS AUTO: ABNORMAL /HPF
SP GR UR STRIP: 1.01 (ref 1–1.03)
SQUAMOUS #/AREA URNS AUTO: ABNORMAL /LPF
UROBILINOGEN UR STRIP-ACNC: 0.2 E.U./DL
WBC # BLD AUTO: 9.8 10E3/UL (ref 4–11)
WBC #/AREA URNS AUTO: ABNORMAL /HPF
WBC CLUMPS #/AREA URNS HPF: PRESENT /HPF

## 2022-09-07 PROCEDURE — 82306 VITAMIN D 25 HYDROXY: CPT | Performed by: INTERNAL MEDICINE

## 2022-09-07 PROCEDURE — 81001 URINALYSIS AUTO W/SCOPE: CPT

## 2022-09-07 PROCEDURE — 84156 ASSAY OF PROTEIN URINE: CPT | Performed by: INTERNAL MEDICINE

## 2022-09-07 PROCEDURE — 36415 COLL VENOUS BLD VENIPUNCTURE: CPT | Performed by: INTERNAL MEDICINE

## 2022-09-07 PROCEDURE — 87086 URINE CULTURE/COLONY COUNT: CPT | Performed by: INTERNAL MEDICINE

## 2022-09-07 PROCEDURE — 99215 OFFICE O/P EST HI 40 MIN: CPT | Performed by: INTERNAL MEDICINE

## 2022-09-07 PROCEDURE — 85025 COMPLETE CBC W/AUTO DIFF WBC: CPT | Performed by: INTERNAL MEDICINE

## 2022-09-07 PROCEDURE — 83970 ASSAY OF PARATHORMONE: CPT | Performed by: INTERNAL MEDICINE

## 2022-09-07 PROCEDURE — 80069 RENAL FUNCTION PANEL: CPT | Performed by: INTERNAL MEDICINE

## 2022-09-07 RX ORDER — CEPHALEXIN 500 MG/1
500 CAPSULE ORAL 3 TIMES DAILY
Qty: 15 CAPSULE | Refills: 0 | Status: SHIPPED | OUTPATIENT
Start: 2022-09-07 | End: 2022-09-12

## 2022-09-07 RX ORDER — DOXYCYCLINE 100 MG/1
100 CAPSULE ORAL 2 TIMES DAILY
Qty: 14 CAPSULE | Refills: 0 | Status: SHIPPED | OUTPATIENT
Start: 2022-09-07 | End: 2022-09-14

## 2022-09-07 ASSESSMENT — ENCOUNTER SYMPTOMS: WEAKNESS: 1

## 2022-09-07 NOTE — TELEPHONE ENCOUNTER
Patient needs appointment or UC, preferably in-person so we can have a good look at what is going on.

## 2022-09-07 NOTE — PROGRESS NOTES
ASSESSMENT AND PLAN:      ICD-10-CM    1. Cellulitis of right lower extremity  L03.115 cephALEXin (KEFLEX) 500 MG capsule     doxycycline hyclate (VIBRAMYCIN) 100 MG capsule     CBC with platelets and differential     CBC with platelets and differential   2. Dysuria  R30.0 UA macro with reflex to Microscopic and Culture - Clinc Collect     Urine Microscopic Exam     Urine Culture   3. Acute cystitis with hematuria  N30.01 cephALEXin (KEFLEX) 500 MG capsule   4. Chronic kidney disease, stage 3b (H)  N18.32 Renal panel     Protein  random urine     Vitamin D Deficiency     Parathyroid Hormone Intact     CANCELED: Hemoglobin     Urinalysis performed were dirty catches in hat  Discussed this would affect urinalysis.  Although comparing to previous 1 it still looks improved.  Urine culture performed  We will give extended course of Keflex at higher doses patient still appears weak per roommate Odalis  Urine culture performed if culture is negative may stop antibiotics early.    Regarding rash.  Spots from 2 weeks ago.  If healed and are postinflammatory hyperpigmentation appearing at this time  She does have some generalized light redness of right lower leg with increased areas and back of calf which could be erysipelas/cellulitis  Discussed Keflex normally as treatment for this type of skin infection although potentially with recent hospital/rehab stays she could have MRSA  Discussed starting doxycycline which would not cover the UTI.  She has allergy to Bactrim and I did not feel comfortable with fluoroquinolone with risk of tendon ruptures in her general state of health  Odalis preferred to try increased dose of Keflex to see if that helped skin infection prior to filling doxycycline prescription    Discussed if symptoms rash worsen, may need to go to the ER for IV antibiotic and potential hospitalization  Blood counts performed with plan to give results by saambaat.  White count was not elevated and stable from past  checks, which supports that infection is not serious at this time    Has close follow-up with home nurse evaluations.  CC chart to primary request that she recheck within 2 weeks  PLAN:      Patient Instructions         Urine test shows improvement on keflex.  Increase to 3 x day for 5 days.    Possible cellulitis of right lower leg  Spots started before keflex, so doubt reaction      Possible resistant to keflex. So start Doxycycline   Prefers to wait & see if not improved on increased keflex.    Probiotic , yogurt.    If worse go to ER    Component      Latest Ref Rng & Units 9/4/2022 9/7/2022   Color Urine      Colorless, Straw, Light Yellow, Yellow Yellow Yellow   Appearance Urine      Clear Cloudy (A) Cloudy (A)   Glucose Urine      Negative mg/dL Negative Negative   Bilirubin Urine      Negative Negative Negative   Ketones Urine      Negative mg/dL Negative Negative   Specific Gravity Urine      1.003 - 1.035 1.016 1.010   Blood Urine      Negative 1.0 mg/dL (A) Small (A)   pH Urine      5.0 - 7.0 5.5 5.5   Protein Albumin Urine      Negative mg/dL 100 (A) Negative   Urobilinogen mg/dL      <2.0 mg/dL <2.0    Nitrite Urine      Negative Negative Negative   Leukocyte Esterase Urine      Negative 500 Janette/uL (A) Moderate (A)   Bacteria Urine      None Seen /HPF Many (A) Few (A)   WBC Clumps      None Seen /HPF Present (A) Present (A)   Mucus Urine      None Seen /LPF Present (A)    RBC Urine      0-2 /HPF /HPF 22 (H) 0-2   WBC Urine      0-5 /HPF /HPF >182 (H) 25-50 (A)   Squamous Epithelial /HPF Urine      <=1 /HPF 3 (H)    Transitional Epi      <=1 /HPF 1    Hyaline Casts      <=2 /LPF 15 (H)    Urobilinogen Urine      0.2, 1.0 E.U./dL  0.2   Squamous Epithelial /LPF Urine      None Seen /LPF  Few (A)     Return in about 1 week (around 9/14/2022).    40 minutes spent on the date of the encounter doing chart review, history and exam, documentation and further activities per the note    Lila Clancy,  MD  [facility]    Subjective     Mariel Ribeiro is a 77 year old who presents for Patient presents with:  Urgent Care  UTI: Did a urine culture on Sunday and was put on antibiotics  Derm Problem: Concern of cellulitis in right lower leg. Rash started with 2 red marks 2 weeks ago.     an established patient of [facility].      Patient recently discharged to Home from Rehab  5 days ago-Saturday-patient started to have low-grade temps, generalized weakness and confusion  The following day she had complaints of burning with urination  [facility] health nurse obtained urine test.  Placed on antibiotic cephelaxin 500 mg twice daily for 5 days by physician overseeing her home care  Urine culture growth -      Escherichia coli     MARCI     Ampicillin >=32 ug/mL Resistant     Ampicillin/ Sulbactam 16 ug/mL Intermediate     Cefazolin <=4 ug/mL Susceptible 1     Cefepime <=1 ug/mL Susceptible     Cefoxitin <=4 ug/mL Susceptible     Ceftazidime <=1 ug/mL Susceptible     Ceftriaxone <=1 ug/mL Susceptible     Ciprofloxacin <=0.25 ug/mL Susceptible     Gentamicin <=1 ug/mL Susceptible     Levofloxacin <=0.12 ug/mL Susceptible     Nitrofurantoin <=16 ug/mL Susceptible     Piperacillin/Tazobactam <=4 ug/mL Susceptible     Tobramycin <=1 ug/mL Susceptible     Trimethoprim/Sulfamethoxazole <=1/19 ug/mL Susceptible             2.  Another concern was rash on right lower leg noticed 2 weeks ago  home health nurse noticed 2 red spots on right lower leg  Had virtual video visit 8/25   Recommended observation as did not appear to be active infection  Not placed on antibiotics     Now past few days noticed more red splotches on skin with right leg      Review of Systems   Constitutional: Fever: Resolved.   Neurological: Positive for weakness.           Objective    /76   Pulse 83   Temp 97.1  F (36.2  C) (Temporal)   Resp 16   SpO2 96%   Physical Exam  Vitals reviewed.   Constitutional:       Appearance: She is  ill-appearing. She is not toxic-appearing.      Comments: History per roommate Cassandra Floyd sitting in wheelchair.  Weak   Skin:     Comments: Review of rash  The areas initially noted 2 weeks ago, appear tan.  Previously red per season  Patient has new right red areas noted back of calf  In general when comparing right lower extremity to left lower extremity there is slight pinkness throughout   Neurological:      Mental Status: She is alert.                                Results for orders placed or performed in visit on 09/07/22 (from the past 24 hour(s))   UA macro with reflex to Microscopic and Culture - Clinc Collect    Specimen: Urine, Midstream   Result Value Ref Range    Color Urine Yellow Colorless, Straw, Light Yellow, Yellow    Appearance Urine Cloudy (A) Clear    Glucose Urine Negative Negative mg/dL    Bilirubin Urine Negative Negative    Ketones Urine Negative Negative mg/dL    Specific Gravity Urine 1.010 1.003 - 1.035    Blood Urine Small (A) Negative    pH Urine 5.5 5.0 - 7.0    Protein Albumin Urine Negative Negative mg/dL    Urobilinogen Urine 0.2 0.2, 1.0 E.U./dL    Nitrite Urine Negative Negative    Leukocyte Esterase Urine Moderate (A) Negative   Urine Microscopic Exam   Result Value Ref Range    Bacteria Urine Few (A) None Seen /HPF    RBC Urine 0-2 0-2 /HPF /HPF    WBC Urine 25-50 (A) 0-5 /HPF /HPF    Squamous Epithelials Urine Few (A) None Seen /LPF    WBC Clumps Urine Present (A) None Seen /HPF   CBC with platelets and differential    Narrative    The following orders were created for panel order CBC with platelets and differential.  Procedure                               Abnormality         Status                     ---------                               -----------         ------                     CBC with platelets and d...[[id number]]                      Final result                 Please view results for these tests on the individual orders.   CBC with platelets and  differential   Result Value Ref Range    WBC Count 9.8 4.0 - 11.0 10e3/uL    RBC Count 4.53 3.80 - 5.20 10e6/uL    Hemoglobin 13.4 11.7 - 15.7 g/dL    Hematocrit 42.2 35.0 - 47.0 %    MCV 93 78 - 100 fL    MCH 29.6 26.5 - 33.0 pg    MCHC 31.8 31.5 - 36.5 g/dL    RDW 13.0 10.0 - 15.0 %    Platelet Count 162 150 - 450 10e3/uL    % Neutrophils 65 %    % Lymphocytes 24 %    % Monocytes 7 %    % Eosinophils 3 %    % Basophils 0 %    % Immature Granulocytes 0 %    Absolute Neutrophils 6.4 1.6 - 8.3 10e3/uL    Absolute Lymphocytes 2.3 0.8 - 5.3 10e3/uL    Absolute Monocytes 0.7 0.0 - 1.3 10e3/uL    Absolute Eosinophils 0.3 0.0 - 0.7 10e3/uL    Absolute Basophils 0.0 0.0 - 0.2 10e3/uL    Absolute Immature Granulocytes 0.0 <=0.4 10e3/uL   Protein  random urine   Result Value Ref Range    Total Protein Urine mg/dL 19.6 mg/dL    Total Protein UR MG/MG CR 0.69 (H) 0.00 - 0.20 mg/mg Cr    Creatinine Urine mg/dL 28.5 mg/dL     *Note: Due to a large number of results and/or encounters for the requested time period, some results have not been displayed. A complete set of results can be found in Results Review.           Component      Latest Ref Rng & Units 9/4/2022 9/7/2022   Color Urine      Colorless, Straw, Light Yellow, Yellow Yellow Yellow   Appearance Urine      Clear Cloudy (A) Cloudy (A)   Glucose Urine      Negative mg/dL Negative Negative   Bilirubin Urine      Negative Negative Negative   Ketones Urine      Negative mg/dL Negative Negative   Specific Gravity Urine      1.003 - 1.035 1.016 1.010   Blood Urine      Negative 1.0 mg/dL (A) Small (A)   pH Urine      5.0 - 7.0 5.5 5.5   Protein Albumin Urine      Negative mg/dL 100 (A) Negative   Urobilinogen mg/dL      <2.0 mg/dL <2.0    Nitrite Urine      Negative Negative Negative   Leukocyte Esterase Urine      Negative 500 Janette/uL (A) Moderate (A)   Bacteria Urine      None Seen /HPF Many (A) Few (A)   WBC Clumps      None Seen /HPF Present (A) Present (A)   Mucus Urine       None Seen /LPF Present (A)    RBC Urine      0-2 /HPF /HPF 22 (H) 0-2   WBC Urine      0-5 /HPF /HPF >182 (H) 25-50 (A)   Squamous Epithelial /HPF Urine      <=1 /HPF 3 (H)    Transitional Epi      <=1 /HPF 1    Hyaline Casts      <=2 /LPF 15 (H)    Urobilinogen Urine      0.2, 1.0 E.U./dL  0.2   Squamous Epithelial /LPF Urine      None Seen /LPF  Few (A)

## 2022-09-07 NOTE — TELEPHONE ENCOUNTER
"Writer called patient, and Odalis answered the phone.  Consent to communicate with Odalis Bonillar on file.    Writer reviewed message per JULIANA Wolf CNP, along with [facility] Urgent Care hours.    Odalis verbalized understanding and asked why the home care nurse cannot evaluate and treat patient.  Writer explained Registered Nurses cannot diagnose nor prescribe treatment.  PCP recommends in-person evaluation with a medical provider: Nurse Practitioner, Physician's Assistant or Doctor.    Odalis verbalized understanding and stated \"we'll see what we can do.\"  Odalis informed writer it is difficult getting patient into clinic.    SONIA RobertsN, RN  [facility]    "

## 2022-09-07 NOTE — PATIENT INSTRUCTIONS
Urine test shows improvement on keflex.  Increase to 3 x day for 5 days.    Possible cellulitis of right lower leg  Spots started before keflex, so doubt reaction      Possible resistant to keflex. So start Doxycycline   Prefers to wait & see if not improved on increased keflex.    Probiotic , yogurt.    If worse go to ER    Component      Latest Ref Rng & Units 9/4/2022 9/7/2022   Color Urine      Colorless, Straw, Light Yellow, Yellow Yellow Yellow   Appearance Urine      Clear Cloudy (A) Cloudy (A)   Glucose Urine      Negative mg/dL Negative Negative   Bilirubin Urine      Negative Negative Negative   Ketones Urine      Negative mg/dL Negative Negative   Specific Gravity Urine      1.003 - 1.035 1.016 1.010   Blood Urine      Negative 1.0 mg/dL (A) Small (A)   pH Urine      5.0 - 7.0 5.5 5.5   Protein Albumin Urine      Negative mg/dL 100 (A) Negative   Urobilinogen mg/dL      <2.0 mg/dL <2.0    Nitrite Urine      Negative Negative Negative   Leukocyte Esterase Urine      Negative 500 Janette/uL (A) Moderate (A)   Bacteria Urine      None Seen /HPF Many (A) Few (A)   WBC Clumps      None Seen /HPF Present (A) Present (A)   Mucus Urine      None Seen /LPF Present (A)    RBC Urine      0-2 /HPF /HPF 22 (H) 0-2   WBC Urine      0-5 /HPF /HPF >182 (H) 25-50 (A)   Squamous Epithelial /HPF Urine      <=1 /HPF 3 (H)    Transitional Epi      <=1 /HPF 1    Hyaline Casts      <=2 /LPF 15 (H)    Urobilinogen Urine      0.2, 1.0 E.U./dL  0.2   Squamous Epithelial /LPF Urine      None Seen /LPF  Few (A)

## 2022-09-08 LAB
ALBUMIN SERPL-MCNC: 3.2 G/DL (ref 3.4–5)
ANION GAP SERPL CALCULATED.3IONS-SCNC: 7 MMOL/L (ref 3–14)
BUN SERPL-MCNC: 49 MG/DL (ref 7–30)
CALCIUM SERPL-MCNC: 10.3 MG/DL (ref 8.5–10.1)
CHLORIDE BLD-SCNC: 104 MMOL/L (ref 94–109)
CO2 SERPL-SCNC: 24 MMOL/L (ref 20–32)
CREAT SERPL-MCNC: 1.45 MG/DL (ref 0.52–1.04)
DEPRECATED CALCIDIOL+CALCIFEROL SERPL-MC: 50 UG/L (ref 20–75)
GFR SERPL CREATININE-BSD FRML MDRD: 37 ML/MIN/1.73M2
GLUCOSE BLD-MCNC: 137 MG/DL (ref 70–99)
PHOSPHATE SERPL-MCNC: 3.2 MG/DL (ref 2.5–4.5)
POTASSIUM BLD-SCNC: 3.9 MMOL/L (ref 3.4–5.3)
PTH-INTACT SERPL-MCNC: 78 PG/ML (ref 15–65)
SODIUM SERPL-SCNC: 135 MMOL/L (ref 133–144)

## 2022-09-09 ENCOUNTER — CARE COORDINATION (OUTPATIENT)
Dept: CARDIOLOGY | Facility: CLINIC | Age: 77
End: 2022-09-09

## 2022-09-09 ENCOUNTER — TELEPHONE (OUTPATIENT)
Dept: FAMILY MEDICINE | Facility: CLINIC | Age: 77
End: 2022-09-09

## 2022-09-09 LAB — BACTERIA UR CULT: NORMAL

## 2022-09-09 NOTE — TELEPHONE ENCOUNTER
I called Debbi and left detailed message with verbal orders as written below    Casie Rosas, BSN RN  [facility]

## 2022-09-09 NOTE — PROGRESS NOTES
"  S: No acute events overnight. Care team notes last 24 hours reviewed. Still unhappy about her lymphedema wraps - someone from lymphedema therapy is coming to wrap today. Still hopeful about next week discharge. Still some intermittent nonexertional chest pain with specific movement. Pain in legs has improved. 4-pt ROS otherwise unremarkable.     Vital signs:  Temp: 98  F (36.7  C) Temp src: Oral BP: 128/57 Pulse: 81 Heart Rate: 76 Resp: 16 SpO2: 96 % O2 Device: None (Room air) Oxygen Delivery: 1 LPM Height: 167.6 cm (5' 6\") Weight: 142 kg (313 lb 1.6 oz)  Estimated body mass index is 50.54 kg/(m^2) as calculated from the following:    Height as of this encounter: 1.676 m (5' 6\").    Weight as of this encounter: 142 kg (313 lb 1.6 oz).  Gen: NAD  HEENT:  EOMI  Neck ; supple. JVD . No TM  Chest ; bibasilar fine  rales , occ rhonchi   CVS ; S1 and S2 heard, heart sounds distant   GI ; soft abdomen , non tender , BS positive.   Ext ; 3+ edema  Neuro ; alert and oriented .No facial asymmetry     ROUTINE IP LABS (Last four results)  BMP    Recent Labs  Lab 07/20/18  0534 07/19/18  0709 07/18/18  1907 07/18/18  0740    140 140 140   POTASSIUM 3.6 4.1 4.6 4.5   CHLORIDE 107 105 105 105   ANTOINETTE 8.3* 8.6 8.9 8.6   CO2 29 29 28 31   BUN 43* 43* 45* 45*   CR 1.50* 1.49* 1.50* 1.55*   * 112* 144* 144*     CBC    Recent Labs  Lab 07/20/18  0534 07/19/18  0709 07/18/18  0740 07/17/18  0749 07/14/18  0700   WBC  --   --  8.6 10.8 8.9   RBC  --   --  2.93* 3.23* 2.96*   HGB 8.3* 9.3* 7.9* 8.6* 8.0*   HCT  --  29.7* 26.4* 28.8* 27.4*   MCV  --   --  90 89 93   MCH  --   --  27.0 26.6 27.0   MCHC  --   --  29.9* 29.9* 29.2*   RDW  --   --  15.6* 15.4* 16.8*   PLT  --   --  240 235 248     INRNo lab results found in last 7 days.      A/P ;  Fluid overload : weight continues to increase, now 142kg, will give one time diuril, increase bumex to 3mg IV TID, TTE today to eval structural function    CKD: SCr is now stable, trend " BMP with diuresis and replace K/Mg    Acute Blood loss Anemia ; s/p PRBC 7/18, Hgb improved appropriately but now continuing to trend down. Will keep trending to ensure Hgb >8. Consider reimaging of R leg to eval interval change of hematoma    IDDM ; Endocrine involved       Pt was discussed with attending physician, Dr. Yana Hameed.    Jefry Camargo MD, PhD  Internal Medicine PGY-3  Click to text page [phone]       details…

## 2022-09-09 NOTE — PROGRESS NOTES
[facility] - CardioMEMS Reading Review    September 9, 2022   CardioMEMS reviewed.  Current diuretic: Bumex 4 mg daily  Recent plan of care changes: none. No readings sent since 9/1/22     Current Threshold parameters:       Today's Waveform:       Readings:           RHC Date Wedge Pressure MEMS PAD during cath  (average of the 3 values)     7/1/2019 w/MEMS implant     20 mmHg   18 mmHg           Rosina Mays RN

## 2022-09-09 NOTE — TELEPHONE ENCOUNTER
Reason for Call:  Home Health Care    Debbi with [facility] called regarding (reason for call): orders    Orders are needed for this patient.     PT:     OT: needs to extend orders    Skilled Nursing:     Pt Provider: Malika Wolf    Phone Number Homecare Nurse can be reached at: [phone]    Can we leave a detailed message on this number? YES    Phone number patient can be reached at:     Best Time:     Call taken on 9/9/2022 at 9:10 AM by Paula Root

## 2022-09-13 ENCOUNTER — VIRTUAL VISIT (OUTPATIENT)
Dept: NEPHROLOGY | Facility: CLINIC | Age: 77
End: 2022-09-13
Attending: PHYSICIAN ASSISTANT
Payer: MEDICARE

## 2022-09-13 VITALS — BODY MASS INDEX: 45.99 KG/M2 | WEIGHT: 293 LBS | HEIGHT: 67 IN

## 2022-09-13 DIAGNOSIS — N18.32 CHRONIC KIDNEY DISEASE, STAGE 3B (H): Primary | ICD-10-CM

## 2022-09-13 DIAGNOSIS — I10 HYPERTENSION, ESSENTIAL: ICD-10-CM

## 2022-09-13 DIAGNOSIS — E83.52 HYPERCALCEMIA: ICD-10-CM

## 2022-09-13 PROCEDURE — 99214 OFFICE O/P EST MOD 30 MIN: CPT | Mod: 95 | Performed by: PHYSICIAN ASSISTANT

## 2022-09-13 PROCEDURE — G0463 HOSPITAL OUTPT CLINIC VISIT: HCPCS | Mod: PN,RTG | Performed by: PHYSICIAN ASSISTANT

## 2022-09-13 ASSESSMENT — PAIN SCALES - GENERAL: PAINLEVEL: NO PAIN (0)

## 2022-09-13 NOTE — LETTER
9/13/2022       RE: Mariel Ribeiro  [address] [place] [address]     Dear Colleague,    Thank you for referring your patient, Mariel Ribeiro, to the [facility]PH[facility] [place] at [facility]. Please see a copy of my visit note below.    Nephrology Clinic Follow Up Visit  9/13/2022    Assessment and Plan:     # CKD 3b with microalbuminuria : Baseline Cr ~1.5-2.0 with significant fluctuation since 2005 with persistent microalbuminuria. She has overall remained stable with Cr even better recently than prior baseline. Recently hospitalized due to fall and confusion. Due to recent fall (though it was mechanical/weakness) and hx of fainting, I'm hesitant to increase her losartan at this time, but would consider it in the future if microalbuminuria continues to increase and/or if SBP rises to >150. Plan for follow up in 6 months.  - No change in management  - Follow up in 2 months     # Hypertension: BP averaging 140s -150 systolic  - current regimen: losartan 25 mg daily, metoprolol 12.5 mg in am, 25 mg in pm, bumex 4 mg daily (has MEMS and follows with cardiology).   Borderline control, however has hx of fainting with stricter BP management and had recent fall. Risks of increase BP meds outweigh benefits at this time. If SBP consistently >150, would increase losartan to 50mg daily.     # Electrolytes:   - recent Potassium 3.9, sodium 135, bicarb 24.  - stable will monitor     # Mineral Bone Disorder:   Ca 10.3, 9/7/22 PTH 78, Vit D 50, no recent phos.   - Not on Vit D (appropriately).  - hypercalcemia, could be in setting of decreased hydration limited mobility  - if remains elevated, check SPEP and IF      Assessment and plan was discussed with patient and she voiced her understanding and agreement.    Disposition: follow up in 2 months with labs.    Reason for Visit:  Mariel Ribeiro is a 77 year old female with history of type II diabetes  mellitus, kidney failure, TIA, CHF, CAD status post CABG, COPD, thyroid cancer status post thyroidectomy and resultant hypothyroidism, and hypertension, who presents for management of CKD and proteinuria.    HPI:  She was recently hospitalized due to falls and confusion. She spent time in TCU and is now back home. On speaking with her and her roommate, she is doing much better. She seems stronger and is getting around more confidently.     She denies any lightheadedness, dizziness, or orthostatic symptoms. Her BP has mostly been in the 140s-150/70-80s, with occasional SBP in the 150s. She has not had any recorded low blood pressures. Her weight has been stable on her current diuretic dose. She monitors volume status with CardioMEM device in place. No SOB, no chest pain, appetite is good, no n/v/d.       ROS:  A comprehensive review of systems was obtained and negative, except as noted in the HPI or PMH.    Active Medical Problems:  Patient Active Problem List   Diagnosis     Hypothyroidism     Diverticulitis of colon     Coronary atherosclerosis     Myalgia and myositis     Migraine     Chronic airway obstruction/ COPD     Mononeuritis     Obstructive sleep apnea     Vitamin D deficiency     Hypertension goal BP (blood pressure) < 130/80     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)     Hyperlipidemia with target LDL less than 70     Chronic diastolic heart failure (H)     Osteoarthritis     Morbid obesity (H)     Arthritis of knee     Transient cerebral ischemia     History of thyroid cancer     Cervicalgia     Fatty liver disease, nonalcoholic     Hepatomegaly     Angina at rest (H)     S/P CABG x 3     Type 2 diabetes mellitus with stage 3 chronic kidney disease, with long-term current use of insulin (H)     Lymphedema     Lower back pain     Bilateral carotid artery disease (H)     Spinal stenosis of lumbar region, unspecified whether neurogenic claudication present     Status post coronary angiogram      Hemoptysis     Intertrigo     Malignant neoplasm of lower-inner quadrant of left breast in female, estrogen receptor positive (H)     Pain in the coccyx     Facet arthropathy     CKD (chronic kidney disease) stage 4, GFR 15-29 ml/min (H)     Facet arthropathy, lumbar     Temporal pain     Elevated C-reactive protein     Hypertensive chronic kidney disease with stage 5 chronic kidney disease or end stage renal disease (H)     Generalized weakness     Recurrent falls       Personal Hx:   Social History     Tobacco Use     Smoking status: Former Smoker     Packs/day: 0.00     Years: 27.00     Pack years: 0.00     Types: Cigarettes     Quit date: 1989     Years since quittin.2     Smokeless tobacco: Never Used   Substance Use Topics     Alcohol use: No     Alcohol/week: 0.0 standard drinks       Allergies: Reviewed by provider.     Medications:  Current Outpatient Medications   Medication Sig     acetaminophen (TYLENOL) 325 MG tablet Take 3 tablets (975 mg) by mouth every 8 hours     albuterol (PROAIR HFA/PROVENTIL HFA/VENTOLIN HFA) 108 (90 Base) MCG/ACT inhaler INHALE TWO PUFFS INTO LUNGS EVERY SIX HOURS     anastrozole (ARIMIDEX) 1 MG tablet Take 1 tablet (1 mg) by mouth daily     aspirin (ASA) 81 MG EC tablet Take 1 tablet (81 mg) by mouth daily     atorvastatin (LIPITOR) 40 MG tablet Take 1 tablet (40 mg) by mouth in the morning.     blood glucose (ONETOUCH ULTRA) test strip Use to test blood sugar four times daily or as directed.     bumetanide (BUMEX) 1 MG tablet 4 mg daily     Continuous Blood Gluc  (FREESTYLE MARTY 14 DAY READER) JEZ 1 Units 4 times daily (before meals and nightly)     Continuous Blood Gluc Sensor (FREESTYLE MARTY 14 DAY SENSOR) [facility] PLACE NEW SENSOR TO BACK OF ARM EVERY 14 DAYS TO MONITOR BLOOD SUGARS     Continuous Blood Gluc Sensor (FREESTYLE MARTY 14 DAY SENSOR) [facility] Place new sensor to back of arm q14 days to monitor blood sugars     doxycycline hyclate (VIBRAMYCIN) 100 MG  capsule Take 1 capsule (100 mg) by mouth 2 times daily for 7 days     DULoxetine (CYMBALTA) 30 MG capsule Take 1 capsule (30 mg) by mouth 2 times daily     EPINEPHrine (ANY BX GENERIC EQUIV) 0.3 MG/0.3ML injection 2-pack Inject 0.3 mLs (0.3 mg) into the muscle once as needed for anaphylaxis     escitalopram (LEXAPRO) 20 MG tablet      ferrous gluconate (FERGON) 324 (38 Fe) MG tablet TAKE 1 TABLET BY MOUTH EVERY MONDAY, WEDNESDAY, AND FRIDAY MORNING     folic acid (FOLVITE) 1 MG tablet Take 2 tablets (2 mg) by mouth daily     guaiFENesin (MUCINEX) 600 MG 12 hr tablet Take 1 tablet (600 mg) by mouth 2 times daily     insulin glargine (LANTUS SOLOSTAR) 100 UNIT/ML pen Inject 29 Units Subcutaneous daily     levothyroxine (SYNTHROID) 150 MCG tablet Take 1 tablet (150 mcg) by mouth daily     [employer] LANCETS MISC Use to test blood sugars 2 times daily or as directed.     losartan (COZAAR) 50 MG tablet TAKE 1/2 TABLET(25 MG) BY MOUTH DAILY     metoprolol succinate ER (TOPROL-XL) 25 MG 24 hr tablet Take 0.5 tablets (12.5 mg) by mouth every morning AND 1 tablet (25 mg) every evening.     miconazole (MICATIN/MICRO GUARD) 2 % external powder Apply topically as needed for itching or other Redness in bilateral groin and katharine clef     niacin ER (NIASPAN) 500 MG CR tablet TAKE 1 TABLET(500 MG) BY MOUTH TWICE DAILY     nitroGLYcerin (NITROSTAT) 0.4 MG sublingual tablet For chest pain place 1 tablet under the tongue every 5 minutes for 3 doses. If symptoms persist 5 minutes after 1st dose call 911.     nystatin POWD 1 Dose 4 times daily     ONE TOUCH ULTRA (DEVICES) MISC test blood sugar BID     potassium chloride ER (KLOR-CON M) 10 MEQ CR tablet Take 2 tablets (20 mEq) by mouth 2 times daily     pregabalin (LYRICA) 100 MG capsule Take 1 capsule (100 mg) by mouth 2 times daily     repaglinide (PRANDIN) 1 MG tablet Take 1 tablet (1 mg) by mouth 3 times daily (before meals)     Skin Protectants, Misc. (INTERDRY AG TEXTILE  "10\"X144\") NELSON Externally apply 1 Box topically daily Apply to areas of intertrigo prn     tolterodine ER (DETROL LA) 2 MG 24 hr capsule TAKE 2 CAPSULES(4 MG) BY MOUTH DAILY     No current facility-administered medications for this visit.     Facility-Administered Medications Ordered in Other Visits   Medication     sodium chloride (PF) 0.9% PF flush 10 mL       Vitals:  There were no vitals taken for this visit.    Exam:  Vital signs were deferred for this telemedicine visit.      LABS:   CMP  Recent Labs   Lab Test 09/07/22  1705 07/16/22  1018 07/15/22  2211 07/15/22  1759 07/14/22  1832 07/14/22  1537 07/13/22  0855 07/13/22  0619 07/15/21  0948 07/09/21  1700 04/29/21  1116 03/18/21  1423 01/26/21  0000 12/22/20  0928    142  --   --   --  142  --  144   < > 142 140 139  --  141   POTASSIUM 3.9 3.7  --   --   --  3.8  --  3.4   < > 3.7 3.7 3.7  --  3.9   CHLORIDE 104 106  --   --   --  104  --  107   < > 107 105 107  --  108   CO2 24 24  --   --   --  27  --  21*   < > 33* 29 28  --  28   ANIONGAP 7 12  --   --   --  11  --  16*   < > 1* 6 4  --  5   * 156* 197* 120*   < > 177*   < > 185*   < > 134* 155* 174*   < > 164*   BUN 49* 27.2*  --   --   --  30.0*  --  24.3*   < > 38* 36* 32*  --  33*   CR 1.45* 1.17*  --   --   --  1.50*  --  0.98*   < > 1.31* 1.51* 1.36*  --  1.52*   GFRESTIMATED 37* 48*  --   --   --  36*  --  60*   < > 40* 33* 38*  --  33*   GFRESTBLACK  --   --   --   --   --   --   --   --   --  46* 39* 44*  --  39*   ANTOINETTE 10.3* 9.9  --   --   --  9.7  --  10.2   < > 9.6 9.6 9.4  --  10.0    < > = values in this interval not displayed.     Recent Labs   Lab Test 07/16/22  1018 07/12/22  1423 03/09/22  1304 07/09/21  1700   BILITOTAL 0.6 0.4 0.5 0.4   ALKPHOS 88 95 109 108   ALT 11 17 24 22   AST 26 20 19 20     CBC  Recent Labs   Lab Test 09/07/22  1705 07/16/22  1018 07/14/22  1537 07/13/22  0619   HGB 13.4 14.3 14.0 14.9   WBC 9.8 9.4 9.6 9.4   RBC 4.53 4.84 4.85 5.09   HCT 42.2 " 43.3 44.2 46.3   MCV 93 90 91 91   MCH 29.6 29.5 28.9 29.3   MCHC 31.8 33.0 31.7 32.2   RDW 13.0 13.2 13.7 13.2    142* 129* 142*     URINE STUDIES  Recent Labs   Lab Test 09/07/22  1541 09/04/22  1220 07/12/22  1132 04/29/21  1117 03/16/21  1430 06/30/18  1810 11/15/17  1717 08/23/17  1150   COLOR Yellow Yellow Light Yellow Yellow Light Yellow   < > Yellow Yellow   APPEARANCE Cloudy* Cloudy* Clear Clear Clear   < > Clear Clear   URINEGLC Negative Negative Negative Negative Negative   < > Negative Negative   URINEBILI Negative Negative Negative Negative Negative   < > Negative Negative   URINEKETONE Negative Negative Negative Negative Negative   < > Negative Negative   SG 1.010 1.016 1.015 1.015 1.015   < > 1.010 1.010   UBLD Small* 1.0 mg/dL* Negative Negative Negative   < > Trace* Negative   URINEPH 5.5 5.5 5.5 5.0 5.5   < > 6.0 5.5   PROTEIN Negative 100 * 50 * Negative Negative   < > Negative Negative   UROBILINOGEN 0.2  --   --  0.2  --   --  0.2 0.2   NITRITE Negative Negative Negative Negative Negative   < > Negative Negative   LEUKEST Moderate* 500 Janette/uL* Negative Negative Trace*   < > Moderate* Moderate*   RBCU 0-2 22* <1  --  1   < > O - 2 O - 2   WBCU 25-50* >182* 0  --  4   < > 10-25* 5-10*    < > = values in this interval not displayed.     Recent Labs   Lab Test 03/16/21  1430   UTPG 0.15     PTH  Recent Labs   Lab Test 09/07/22  1717 03/18/21  1423   PTHI 78* 71     IRON STUDIES  Recent Labs   Lab Test 08/15/18  1353 07/14/18  1122 07/14/18  0900 07/14/18  0700   IRON 19* 24* Unsatisfactory specimen - hemolyzed Unsatisfactory specimen - hemolyzed   * 322 Unsatisfactory specimen - hemolyzed Unsatisfactory specimen - hemolyzed   IRONSAT 4* 7* Not Calculated Not Calculated   CARMEN 22  --   --  58       Gwendolyn Mobley PA-C    Visit length 13 minutes. An additional 20 minutes were spent on date of service in chart review, documentation, and other activities as noted.       Again, thank you  for allowing me to participate in the care of your patient.      Sincerely,    BUCKY MARTINEZ PA-C

## 2022-09-13 NOTE — PROGRESS NOTES
Nephrology Clinic Follow Up Visit  9/13/2022    Assessment and Plan:     # CKD 3b with microalbuminuria : Baseline Cr ~1.5-2.0 with significant fluctuation since 2005 with persistent microalbuminuria. She has overall remained stable with Cr even better recently than prior baseline. Recently hospitalized due to fall and confusion. Due to recent fall (though it was mechanical/weakness) and hx of fainting, I'm hesitant to increase her losartan at this time, but would consider it in the future if microalbuminuria continues to increase and/or if SBP rises to >150. Plan for follow up in 6 months.  - No change in management  - Follow up in 2 months     # Hypertension: BP averaging 140s -150 systolic  - current regimen: losartan 25 mg daily, metoprolol 12.5 mg in am, 25 mg in pm, bumex 4 mg daily (has MEMS and follows with cardiology).   Borderline control, however has hx of fainting with stricter BP management and had recent fall. Risks of increase BP meds outweigh benefits at this time. If SBP consistently >150, would increase losartan to 50mg daily.     # Electrolytes:   - recent Potassium 3.9, sodium 135, bicarb 24.  - stable will monitor     # Mineral Bone Disorder:   Ca 10.3, 9/7/22 PTH 78, Vit D 50, no recent phos.   - Not on Vit D (appropriately).  - hypercalcemia, could be in setting of decreased hydration limited mobility  - if remains elevated, check SPEP and IF      Assessment and plan was discussed with patient and she voiced her understanding and agreement.    Disposition: follow up in 2 months with labs.    Reason for Visit:  Mariel Ribeiro is a 77 year old female with history of type II diabetes mellitus, kidney failure, TIA, CHF, CAD status post CABG, COPD, thyroid cancer status post thyroidectomy and resultant hypothyroidism, and hypertension, who presents for management of CKD and proteinuria.    HPI:  She was recently hospitalized due to falls and confusion. She spent time in TCU and is now back home.  On speaking with her and her roommate, she is doing much better. She seems stronger and is getting around more confidently.     She denies any lightheadedness, dizziness, or orthostatic symptoms. Her BP has mostly been in the 140s-150/70-80s, with occasional SBP in the 150s. She has not had any recorded low blood pressures. Her weight has been stable on her current diuretic dose. She monitors volume status with CardioMEM device in place. No SOB, no chest pain, appetite is good, no n/v/d.       ROS:  A comprehensive review of systems was obtained and negative, except as noted in the HPI or PMH.    Active Medical Problems:  Patient Active Problem List   Diagnosis     Hypothyroidism     Diverticulitis of colon     Coronary atherosclerosis     Myalgia and myositis     Migraine     Chronic airway obstruction/ COPD     Mononeuritis     Obstructive sleep apnea     Vitamin D deficiency     Hypertension goal BP (blood pressure) < 130/80     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)     Hyperlipidemia with target LDL less than 70     Chronic diastolic heart failure (H)     Osteoarthritis     Morbid obesity (H)     Arthritis of knee     Transient cerebral ischemia     History of thyroid cancer     Cervicalgia     Fatty liver disease, nonalcoholic     Hepatomegaly     Angina at rest (H)     S/P CABG x 3     Type 2 diabetes mellitus with stage 3 chronic kidney disease, with long-term current use of insulin (H)     Lymphedema     Lower back pain     Bilateral carotid artery disease (H)     Spinal stenosis of lumbar region, unspecified whether neurogenic claudication present     Status post coronary angiogram     Hemoptysis     Intertrigo     Malignant neoplasm of lower-inner quadrant of left breast in female, estrogen receptor positive (H)     Pain in the coccyx     Facet arthropathy     CKD (chronic kidney disease) stage 4, GFR 15-29 ml/min (H)     Facet arthropathy, lumbar     Temporal pain     Elevated C-reactive protein      Hypertensive chronic kidney disease with stage 5 chronic kidney disease or end stage renal disease (H)     Generalized weakness     Recurrent falls       Personal Hx:   Social History     Tobacco Use     Smoking status: Former Smoker     Packs/day: 0.00     Years: 27.00     Pack years: 0.00     Types: Cigarettes     Quit date: 1989     Years since quittin.2     Smokeless tobacco: Never Used   Substance Use Topics     Alcohol use: No     Alcohol/week: 0.0 standard drinks       Allergies: Reviewed by provider.     Medications:  Current Outpatient Medications   Medication Sig     acetaminophen (TYLENOL) 325 MG tablet Take 3 tablets (975 mg) by mouth every 8 hours     albuterol (PROAIR HFA/PROVENTIL HFA/VENTOLIN HFA) 108 (90 Base) MCG/ACT inhaler INHALE TWO PUFFS INTO LUNGS EVERY SIX HOURS     anastrozole (ARIMIDEX) 1 MG tablet Take 1 tablet (1 mg) by mouth daily     aspirin (ASA) 81 MG EC tablet Take 1 tablet (81 mg) by mouth daily     atorvastatin (LIPITOR) 40 MG tablet Take 1 tablet (40 mg) by mouth in the morning.     blood glucose (ONETOUCH ULTRA) test strip Use to test blood sugar four times daily or as directed.     bumetanide (BUMEX) 1 MG tablet 4 mg daily     Continuous Blood Gluc  (FREESTYLE MARTY 14 DAY READER) JEZ 1 Units 4 times daily (before meals and nightly)     Continuous Blood Gluc Sensor (FREESTYLE MARTY 14 DAY SENSOR) [facility] PLACE NEW SENSOR TO BACK OF ARM EVERY 14 DAYS TO MONITOR BLOOD SUGARS     Continuous Blood Gluc Sensor (FREESTYLE MARTY 14 DAY SENSOR) [facility] Place new sensor to back of arm q14 days to monitor blood sugars     doxycycline hyclate (VIBRAMYCIN) 100 MG capsule Take 1 capsule (100 mg) by mouth 2 times daily for 7 days     DULoxetine (CYMBALTA) 30 MG capsule Take 1 capsule (30 mg) by mouth 2 times daily     EPINEPHrine (ANY BX GENERIC EQUIV) 0.3 MG/0.3ML injection 2-pack Inject 0.3 mLs (0.3 mg) into the muscle once as needed for anaphylaxis     escitalopram (LEXAPRO) 20  "MG tablet      ferrous gluconate (FERGON) 324 (38 Fe) MG tablet TAKE 1 TABLET BY MOUTH EVERY MONDAY, WEDNESDAY, AND FRIDAY MORNING     folic acid (FOLVITE) 1 MG tablet Take 2 tablets (2 mg) by mouth daily     guaiFENesin (MUCINEX) 600 MG 12 hr tablet Take 1 tablet (600 mg) by mouth 2 times daily     insulin glargine (LANTUS SOLOSTAR) 100 UNIT/ML pen Inject 29 Units Subcutaneous daily     levothyroxine (SYNTHROID) 150 MCG tablet Take 1 tablet (150 mcg) by mouth daily     [employer] LANCETS MISC Use to test blood sugars 2 times daily or as directed.     losartan (COZAAR) 50 MG tablet TAKE 1/2 TABLET(25 MG) BY MOUTH DAILY     metoprolol succinate ER (TOPROL-XL) 25 MG 24 hr tablet Take 0.5 tablets (12.5 mg) by mouth every morning AND 1 tablet (25 mg) every evening.     miconazole (MICATIN/MICRO GUARD) 2 % external powder Apply topically as needed for itching or other Redness in bilateral groin and  clef     niacin ER (NIASPAN) 500 MG CR tablet TAKE 1 TABLET(500 MG) BY MOUTH TWICE DAILY     nitroGLYcerin (NITROSTAT) 0.4 MG sublingual tablet For chest pain place 1 tablet under the tongue every 5 minutes for 3 doses. If symptoms persist 5 minutes after 1st dose call 911.     nystatin POWD 1 Dose 4 times daily     ONE TOUCH ULTRA (DEVICES) MISC test blood sugar BID     potassium chloride ER (KLOR-CON M) 10 MEQ CR tablet Take 2 tablets (20 mEq) by mouth 2 times daily     pregabalin (LYRICA) 100 MG capsule Take 1 capsule (100 mg) by mouth 2 times daily     repaglinide (PRANDIN) 1 MG tablet Take 1 tablet (1 mg) by mouth 3 times daily (before meals)     Skin Protectants, Misc. (INTERDRY AG TEXTILE 10\"X144\") [employer] Externally apply 1 Box topically daily Apply to areas of intertrigo prn     tolterodine ER (DETROL LA) 2 MG 24 hr capsule TAKE 2 CAPSULES(4 MG) BY MOUTH DAILY     No current facility-administered medications for this visit.     Facility-Administered Medications Ordered in Other Visits   Medication     " sodium chloride (PF) 0.9% PF flush 10 mL       Vitals:  There were no vitals taken for this visit.    Exam:  Vital signs were deferred for this telemedicine visit.      LABS:   CMP  Recent Labs   Lab Test 09/07/22  1705 07/16/22  1018 07/15/22  2211 07/15/22  1759 07/14/22  1832 07/14/22  1537 07/13/22  0855 07/13/22  0619 07/15/21  0948 07/09/21  1700 04/29/21  1116 03/18/21  1423 01/26/21  0000 12/22/20  0928    142  --   --   --  142  --  144   < > 142 140 139  --  141   POTASSIUM 3.9 3.7  --   --   --  3.8  --  3.4   < > 3.7 3.7 3.7  --  3.9   CHLORIDE 104 106  --   --   --  104  --  107   < > 107 105 107  --  108   CO2 24 24  --   --   --  27  --  21*   < > 33* 29 28  --  28   ANIONGAP 7 12  --   --   --  11  --  16*   < > 1* 6 4  --  5   * 156* 197* 120*   < > 177*   < > 185*   < > 134* 155* 174*   < > 164*   BUN 49* 27.2*  --   --   --  30.0*  --  24.3*   < > 38* 36* 32*  --  33*   CR 1.45* 1.17*  --   --   --  1.50*  --  0.98*   < > 1.31* 1.51* 1.36*  --  1.52*   GFRESTIMATED 37* 48*  --   --   --  36*  --  60*   < > 40* 33* 38*  --  33*   GFRESTBLACK  --   --   --   --   --   --   --   --   --  46* 39* 44*  --  39*   ANTOINETTE 10.3* 9.9  --   --   --  9.7  --  10.2   < > 9.6 9.6 9.4  --  10.0    < > = values in this interval not displayed.     Recent Labs   Lab Test 07/16/22  1018 07/12/22  1423 03/09/22  1304 07/09/21  1700   BILITOTAL 0.6 0.4 0.5 0.4   ALKPHOS 88 95 109 108   ALT 11 17 24 22   AST 26 20 19 20     CBC  Recent Labs   Lab Test 09/07/22  1705 07/16/22  1018 07/14/22  1537 07/13/22  0619   HGB 13.4 14.3 14.0 14.9   WBC 9.8 9.4 9.6 9.4   RBC 4.53 4.84 4.85 5.09   HCT 42.2 43.3 44.2 46.3   MCV 93 90 91 91   MCH 29.6 29.5 28.9 29.3   MCHC 31.8 33.0 31.7 32.2   RDW 13.0 13.2 13.7 13.2    142* 129* 142*     URINE STUDIES  Recent Labs   Lab Test 09/07/22  1541 09/04/22  1220 07/12/22  1132 04/29/21  1117 03/16/21  1430 06/30/18  1810 11/15/17  1717 08/23/17  1150   COLOR Yellow Yellow  Light Yellow Yellow Light Yellow   < > Yellow Yellow   APPEARANCE Cloudy* Cloudy* Clear Clear Clear   < > Clear Clear   URINEGLC Negative Negative Negative Negative Negative   < > Negative Negative   URINEBILI Negative Negative Negative Negative Negative   < > Negative Negative   URINEKETONE Negative Negative Negative Negative Negative   < > Negative Negative   SG 1.010 1.016 1.015 1.015 1.015   < > 1.010 1.010   UBLD Small* 1.0 mg/dL* Negative Negative Negative   < > Trace* Negative   URINEPH 5.5 5.5 5.5 5.0 5.5   < > 6.0 5.5   PROTEIN Negative 100 * 50 * Negative Negative   < > Negative Negative   UROBILINOGEN 0.2  --   --  0.2  --   --  0.2 0.2   NITRITE Negative Negative Negative Negative Negative   < > Negative Negative   LEUKEST Moderate* 500 Janette/uL* Negative Negative Trace*   < > Moderate* Moderate*   RBCU 0-2 22* <1  --  1   < > O - 2 O - 2   WBCU 25-50* >182* 0  --  4   < > 10-25* 5-10*    < > = values in this interval not displayed.     Recent Labs   Lab Test 03/16/21  1430   UTPG 0.15     PTH  Recent Labs   Lab Test 09/07/22  1717 03/18/21  1423   PTHI 78* 71     IRON STUDIES  Recent Labs   Lab Test 08/15/18  1353 07/14/18  1122 07/14/18  0900 07/14/18  0700   IRON 19* 24* Unsatisfactory specimen - hemolyzed Unsatisfactory specimen - hemolyzed   * 322 Unsatisfactory specimen - hemolyzed Unsatisfactory specimen - hemolyzed   IRONSAT 4* 7* Not Calculated Not Calculated   CARMEN 22  --   --  58       Gwendolyn Mobley PA-C    Visit length 13 minutes. An additional 20 minutes were spent on date of service in chart review, documentation, and other activities as noted.

## 2022-09-13 NOTE — PATIENT INSTRUCTIONS
Increase hydration during the day, continue low sodium diet.   Check blood pressure at home and keep log.   Avoid ibuprofen/aleve.   Follow up with labs in 2 months. Call sooner with any questions or concerns.

## 2022-09-13 NOTE — PROGRESS NOTES
Mariel is a 77 year old who is being evaluated via a billable video visit.      How would you like to obtain your AVS? MyChart  If the video visit is dropped, the invitation should be resent by: Send to e-mail at: [email or web address]  Will anyone else be joining your video visit? Yes roommate Odalis        Video-Visit Details    Video Start Time: 3:19 pm  Type of service:  Video Visit    Video End Time: 3:32 pm    Originating Location (pt. Location): Home    Distant Location (provider location):  [facility]anna     Platform used for Video Visit: [employer]

## 2022-09-14 ENCOUNTER — CARE COORDINATION (OUTPATIENT)
Dept: CARDIOLOGY | Facility: CLINIC | Age: 77
End: 2022-09-14

## 2022-09-14 NOTE — PROGRESS NOTES
[facility] - CardioMEMS Reading Review    September 14, 2022   CardioMEMS reviewed.  Current diuretic: Bumex 4 mg daily  Recent plan of care changes: none     Current Threshold parameters:       Today's Waveform:       Readings:           RHC Date Wedge Pressure MEMS PAD during cath  (average of the 3 values)     7/1/2019 w/MEMS implant     20 mmHg   18 mmHg           Rosina Mays RN

## 2022-09-15 ENCOUNTER — DOCUMENTATION ONLY (OUTPATIENT)
Dept: OTHER | Facility: CLINIC | Age: 77
End: 2022-09-15

## 2022-09-16 ENCOUNTER — PATIENT OUTREACH (OUTPATIENT)
Dept: CARE COORDINATION | Facility: CLINIC | Age: 77
End: 2022-09-16

## 2022-09-16 NOTE — PROGRESS NOTES
Clinic Care Coordination Contact  [facility]/Voicemail       Clinical Data: Care Coordinator Outreach  Outreach attempted x 1.  Left message on patient's voicemail with call back information and requested return call.  Plan: Care Coordinator will try to reach patient again in 1-2 business days.    Kayla Salazar RN, BSN, CPHN, CM  [facility] Ambulatory Care Management  [facility]  Phone: [phone]  Email: [email or web address]

## 2022-09-19 ENCOUNTER — PATIENT OUTREACH (OUTPATIENT)
Dept: CARE COORDINATION | Facility: CLINIC | Age: 77
End: 2022-09-19

## 2022-09-19 ENCOUNTER — TELEPHONE (OUTPATIENT)
Dept: FAMILY MEDICINE | Facility: CLINIC | Age: 77
End: 2022-09-19

## 2022-09-19 DIAGNOSIS — M25.512 PAIN IN JOINT OF LEFT SHOULDER: ICD-10-CM

## 2022-09-19 RX ORDER — PREGABALIN 100 MG/1
CAPSULE ORAL
Qty: 180 CAPSULE | Refills: 1 | Status: SHIPPED | OUTPATIENT
Start: 2022-09-19 | End: 2023-02-27

## 2022-09-19 NOTE — PROGRESS NOTES
Clinic Care Coordination Contact  [facility]/Voicemail       Clinical Data: Care Coordinator Outreach  Outreach attempted x 2.  Left message on Odalis's voicemail with call back information and requested return call.  Plan: Care Coordinator will try to reach patient again in 3-5 business days.    Kayla Salazar RN, BSN, CPHN, CM  [facility] Ambulatory Care Management  [facility]  Phone: [phone]  Email: [email or web address]

## 2022-09-19 NOTE — TELEPHONE ENCOUNTER
The Home Care/Assisted Living/Nursing Facility is calling regarding an established patient.  Has the patient seen Home Care in the past or is currently residing in Assisted Living or Nursing Facility? Yes.   2  Tricia calling from [facility] requesting the following orders that are within the Home Care, Assisted Living or Nursing Home Eval and Treatment standing order and can be signed as standing order signature required by RN.    Preferred Call Back Number: [phone]    PT/OT/Speech Therapy    Any additional Orders:  Are there any orders requested, not stated above, that are outside of the standing order and must be routed to a licensed practitioner for approval?    No.     Writer has verified Requestor will send fax to have orders signed.    Last visit 8/25/2022.   Gave verbal orders.     SONIA EscobarN JODY  [facility]

## 2022-09-19 NOTE — TELEPHONE ENCOUNTER
Reason for Call: Other    Detailed comments: [facility] is requesting -  Home care PT  2 x week for 3 weeks  For lower extremity strengthening, gate training, transfers and stair navigation.    Phone Number Patient can be reached at: [phone]    Best Time: Any    Can we leave a detailed message on this number? YES    Call taken on 9/19/2022 at 9:56 AM by Korin Castro

## 2022-09-20 ENCOUNTER — PATIENT OUTREACH (OUTPATIENT)
Dept: CARE COORDINATION | Facility: CLINIC | Age: 77
End: 2022-09-20

## 2022-09-20 NOTE — PROGRESS NOTES
Clinic Care Coordination Contact    Follow Up Progress Note      Assessment: RN CC followed up with Odalis, patient's friend, to follow up on items in the care plan for home care/PCA services & finding a hospital bed.   Patient's ongoing issue - a lot of tailbone pain. She has a Temperapedic bed at home. Patient tends to roll out of bed. No injuries occur, but they have to call the paramedics to get her back into bed. At [facility], she had a 42 inch bed and that seemed to work well for Mariel. Patient & Vero are meeting on Thursday at 1pm with Pallative Care. Odalis will be asking information about hospital bed and what type they can obtain.     Patient wants the 4th booster; Odalis to ask her home care RN through [facility] if she can give COVID booster.     Mariel's leg is still red, but not warm. Patient did not take both Doxycycline and Cefalexin as they were worried about too many antibiotics causing diarrhea or yeast infection. RN CC explained that to eradicate the infection, patient needs both antibiotics. She can take a probiotic to help balance her intestinal bacteria. Vero to have home care RN evaluate leg.      DM under good control. Patient taking medications as directed.     Patient will eventually need a PCA service that can stay with her overnight when Odalis is away from home on business.     Care Gaps:    Health Maintenance Due   Topic Date Due     MEDICARE ANNUAL WELLNESS VISIT  08/10/2017     DIABETIC FOOT EXAM  03/19/2019     HF ACTION PLAN  11/30/2021     PHQ-9  05/23/2022     EYE EXAM  06/10/2022     COVID-19 Vaccine (5 - Booster for Moderna series) 06/14/2022     INFLUENZA VACCINE (1) 09/01/2022       Declined due to other issues.      Care Plans  Care Plan: 1. Community Resources     Problem: Sufficient In-home support     Long-Range Goal: Establish adequate home support     Start Date: 8/18/2022 Expected End Date: 12/30/2022    Note:     Goal Statement: I will get set up with home care for RN services,  physical and occupational therapy in the next 1 month.   Date goal set: 9/20/22  Measure of Success: Enrollment in home care  Barriers: Home Care will end then private pay PCA will be needed.   Strengths:Strong support system; engaged in care coordination.  Date to Achieve By:3/9/23  Patient expressed understanding of goal:yes    Action steps to achieve this goal  1. I will accept home care services.   2. I will accept the help of Care Coordination to find a home care agency.     *ANNUAL ASSESSMENT DUE: 08/2023                      Care Plan: Research into hospital bed     Problem: Research into hospital bed     Goal: Complete research into obtaining a hospital bed.     Start Date: 9/20/2022 Expected End Date: 3/20/2023    Note:     Goal Statement: I will research what type of hospital bed can be obtained through private pay.    Date goal set: 9/20/22  Measure of Success: Obtaining a hospital bed.  Barriers:Purchasing new bed & removing old bed  Strengths:Strong support system; agreeable to care coordination  Date to Achieve By:3/20/23  Patient expressed understanding of goal:yes    Action steps to achieve this goal  1. I will research the styles of hospital beds available.   2. I will accept the help of Care Coordination to find additional resources/information on hospital beds.   3. I will ask Pallative care at our meeting 9/22/22 for information on hospital beds available for purchase or provided through Pallative services.                        Intervention/Education provided during outreach: Discussed patients plan of care. CC RN asked open ended questions, provided support, resources, and encouragement as needed. Patient will reach out to care team sooner than planned with new questions or concerns.        Outreach Frequency: monthly    Plan: Updated new goals as discussed with Vero during outreach. Vero would like a home care list of companies that provide PCA services through private pay. She is agreeable to  RN CC sending new care plan and home care list through [employer].     Care Coordinator will follow up in 1 month.     Kayla Salazar RN, BSN, CPHN, CM  [facility] Ambulatory Care Management  [facility]  Phone: [phone]  Email: [email or web address]

## 2022-09-20 NOTE — LETTER
[facility]  Patient Centered Plan of Care  About Me:        Patient Name:  Mariel Ribeiro    YOB: 1945  Age:         77 year old   Destiney MRN:    [id number] Telephone Information:  Home Phone [phone]   Mobile [phone]       Address:  [address] [place] [address] Email address:  [email or web address]      Emergency Contact(s)    Name Relationship Lgl Grd Work Phone Home Phone Mobile Phone   1. KOSTAS PIERSON Roommate No [phone]   2. VITA ODOM Sister No  [phone]   3. ORTIZ,SAKSHI Friend No  [phone]           Primary language:  English     needed? No   [place] [facility]:  [phone] op. 1  Other communication barriers:Glasses    Preferred Method of Communication:  Fabian  Current living arrangement: Other; I live in a private home (i live in a house with a roommate)    Mobility Status/ Medical Equipment: Independent w/Device    Health Maintenance  Health Maintenance Reviewed: Due/Overdue Not discussed due to other issues.     My Access Plan  Medical Emergency 911   Primary Clinic Line [facility] - [phone]   24 Hour Appointment Line [phone] or  [phone]) (toll-free)   24 Hour Nurse Line [phone] (toll-free)   Preferred Urgent Care [facility], [phone]     Preferred [facility]  [phone]     Preferred Pharmacy [id number] - [place], [place] - [address] AT [facility] [address]     Behavioral Health Crisis Line The National Suicide Prevention Lifeline at 1-214.732.4476 or Text/Call 678       My Care Team Members  Patient Care Team       Relationship Specialty Notifications Start End    Malika Wolf, APRN CNP PCP - General Nurse Practitioner Primary Care  2/7/22     Phone: [phone] Fax:  [phone]         [address] [place] [address]    Zeeshan Hutson MD MD Orthopedics  9/8/14      XXX RETIRED  [place] [address]    Jefry Hanson DPM  Podiatry  9/8/14     Phone: [phone] Fax: [phone]         [address] [facility] [address]    Tom Cruz MD MD Neurology  5/12/15     Phone: [phone] Fax: [phone]         [address] [facility] [address]    Rosina Kohler MD MD Family Medicine - Sports Medicine  11/21/16     Phone: [phone] Fax: [phone]         [address] [facility] [address]    Jeffrey Roberson MD MD General Surgery  5/22/17     Phone: [phone] Fax: [phone]         [address] [place] [address] [address] [facility] [address]    Stephanie Wolf MD MD Cardiology  7/2/18     Phone: [phone] Fax: [phone]         [address] [place] [address] [address] [facility] [address]    Cathi Vaughn APRN CNP Nurse Practitioner Cardiology Admissions 8/24/18     [facility]    Phone: [phone] Pager: [phone] Fax: [phone]        [facility] [address]    Rosina Mays, RN Nurse Coordinator Cardiology Admissions 8/24/18     [facility]    Phone: [phone] Fax: [phone]        Shayna Newell, RN Specialty Care Coordinator Cardiology Admissions 3/13/19     Phone: [phone]         Magdalena Hall PA-C Physician Assistant Cardiology Admissions 12/10/19     [facility]    Phone: [phone] Fax: [phone]         [address] [facility] [address]    Radha Byrne, RN Lead Care Coordinator  Admissions 4/6/20 9/20/22    Phone: [phone]         Lesly Cobos, RN Specialty Care Coordinator Breast Oncology Admissions 8/23/20     Phone: [phone] Pager: [phone]        Opal Weber MD MD Breast Oncology Admissions 8/23/20     Phone: [phone] Fax: [phone]         [facility] [address]     Opal Weber MD Assigned Cancer Care Provider   10/23/20     Phone: [phone] Fax: [phone] [address] [facility] [address]    Dahlia Wolfe PA-C Physician Assistant Urology  12/22/20     Phone: [phone] Fax: [phone] [address] [place] [address]    Radha Llanos, RN Diabetes Educator Diabetes Education  1/8/21     Phone: [phone] Fax: [phone]         [address] [address]    Muriel Will PA-C Physician Assistant Endocrinology, Diabetes, and Metabolism  2/10/21     Phone: [phone] Fax: [phone]         2 [facility] [address]    Edvin Greene MD Assigned Neuroscience Provider   3/17/21     Muriel Will PA-C Assigned Endocrinology Provider   3/17/21     Phone: [phone] Fax: [phone]         [address] [facility] [address]    Dahlia Wolfe PA-C Assigned OBGYN Provider   11/21/21     Phone: [phone] Fax: [phone] [address] [facility] [address]    Malika Wolf APRN CNP Assigned PCP   3/27/22     Phone: [phone] Fax: [phone]         [address] [place] [address]    Shreyas Cole, PhD Assigned Behavioral Health Provider   4/17/22     Phone: [phone] Fax: [phone]         [address] [place] [address]    Paula Hdz APRN CNP Nurse Practitioner Neurology  6/7/22     Phone: [phone] Fax: [phone]         [address] [facility] [address]    Magdalena Hall PA-C Assigned Heart and Vascular Provider   6/25/22     Phone: [phone] Fax: [phone]          [facility] [address]    Jefry Ramos MD Assigned Surgical Provider   6/25/22     Phone: [phone] Fax: [phone]         [address] [facility] [address]    Bharat Fitzgerald DPM Assigned Musculoskeletal Provider   7/9/22     Phone: [phone] Fax: [phone]          [address] [facility] [address]    Allison Prather MD Assigned Pulmonology Provider   7/30/22     Phone: [phone] Fax: [phone]         [address] [facility] [address]             Daniella Salazar, RN Lead Care Coordinator  Admissions 8/29/22               My Care Plans  Self Management and Treatment Plan  Care Plan      Care Plan: 1. Community Resources     Problem: Sufficient In-home support     Long-Range Goal: Establish adequate home support     Start Date: 8/18/2022 Expected End Date: 12/30/2022    Note:     Goal Statement: I will get set up with home care for RN services, physical and occupational therapy in the next 1 month.   Date goal set: 9/20/22  Measure of Success: Enrollment in home care  Barriers: Home Care will end then private pay PCA will be needed.   Strengths:Strong support system; engaged in care coordination.  Date to Achieve By:3/9/23  Patient expressed understanding of goal:yes    Action steps to achieve this goal  1. I will accept home care services.   2. I will accept the help of Care Coordination to find a home care agency.     *ANNUAL ASSESSMENT DUE: 08/2023                      Care Plan: Research into hospital bed     Problem: Research into hospital bed     Goal: Complete research into obtaining a hospital bed.     Start Date: 9/20/2022 Expected End Date: 3/20/2023    Note:     Goal Statement: I will research what type of hospital bed can be obtained through private pay.    Date goal set: 9/20/22  Measure of Success: Obtaining a hospital bed.  Barriers:Purchasing new bed & removing old bed  Strengths:Strong support system; agreeable to care coordination  Date to Achieve By:3/20/23  Patient expressed understanding of goal:yes    Action steps to achieve this goal  1. I will research the styles of hospital beds available.   2. I will accept the help of Care Coordination to find additional resources/information on hospital beds.   3. I will ask Pallative  care at our meeting 9/22/22 for information on hospital beds available for purchase or provided through Pallative services.                             Action Plans on File:     Depression        Advance Care Plans/Directives Type:   Advanced Directive - On File      My Medical and Care Information  Problem List   Patient Active Problem List   Diagnosis     Hypothyroidism     Diverticulitis of colon     Coronary atherosclerosis     Myalgia and myositis     Migraine     Chronic airway obstruction/ COPD     Mononeuritis     Obstructive sleep apnea     Vitamin D deficiency     Hypertension goal BP (blood pressure) < 130/80     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)     Hyperlipidemia with target LDL less than 70     Chronic diastolic heart failure (H)     Osteoarthritis     Morbid obesity (H)     Arthritis of knee     Transient cerebral ischemia     History of thyroid cancer     Cervicalgia     Fatty liver disease, nonalcoholic     Hepatomegaly     Angina at rest (H)     S/P CABG x 3     Type 2 diabetes mellitus with stage 3 chronic kidney disease, with long-term current use of insulin (H)     Lymphedema     Lower back pain     Bilateral carotid artery disease (H)     Spinal stenosis of lumbar region, unspecified whether neurogenic claudication present     Status post coronary angiogram     Hemoptysis     Intertrigo     Malignant neoplasm of lower-inner quadrant of left breast in female, estrogen receptor positive (H)     Pain in the coccyx     Facet arthropathy     CKD (chronic kidney disease) stage 4, GFR 15-29 ml/min (H)     Facet arthropathy, lumbar     Temporal pain     Elevated C-reactive protein     Hypertensive chronic kidney disease with stage 5 chronic kidney disease or end stage renal disease (H)     Generalized weakness     Recurrent falls      Current Medications and Allergies:    Current Outpatient Medications   Medication     acetaminophen (TYLENOL) 325 MG tablet     albuterol (PROAIR HFA/PROVENTIL  "HFA/VENTOLIN HFA) 108 (90 Base) MCG/ACT inhaler     anastrozole (ARIMIDEX) 1 MG tablet     aspirin (ASA) 81 MG EC tablet     atorvastatin (LIPITOR) 40 MG tablet     blood glucose (ONETOUCH ULTRA) test strip     bumetanide (BUMEX) 1 MG tablet     Continuous Blood Gluc  (FREESTYLE MARTY 14 DAY READER) JEZ     Continuous Blood Gluc Sensor (FREESTYLE MARTY 14 DAY SENSOR) MISC     Continuous Blood Gluc Sensor (FREESTYLE MARTY 14 DAY SENSOR) MISC     DULoxetine (CYMBALTA) 30 MG capsule     EPINEPHrine (ANY BX GENERIC EQUIV) 0.3 MG/0.3ML injection 2-pack     escitalopram (LEXAPRO) 20 MG tablet     ferrous gluconate (FERGON) 324 (38 Fe) MG tablet     folic acid (FOLVITE) 1 MG tablet     guaiFENesin (MUCINEX) 600 MG 12 hr tablet     insulin glargine (LANTUS SOLOSTAR) 100 UNIT/ML pen     levothyroxine (SYNTHROID) 150 MCG tablet     wavecatchCAN FINEPOINT LANCETS MISC     losartan (COZAAR) 50 MG tablet     metoprolol succinate ER (TOPROL-XL) 25 MG 24 hr tablet     miconazole (MICATIN/MICRO GUARD) 2 % external powder     niacin ER (NIASPAN) 500 MG CR tablet     nitroGLYcerin (NITROSTAT) 0.4 MG sublingual tablet     nystatin POWD     ONE TOUCH ULTRA (DEVICES) MISC     potassium chloride ER (KLOR-CON M) 10 MEQ CR tablet     pregabalin (LYRICA) 100 MG capsule     repaglinide (PRANDIN) 1 MG tablet     Skin Protectants, Misc. ([facility] AG TEXTILE 10\"X144\") SHEE     tolterodine ER (DETROL LA) 2 MG 24 hr capsule     No current facility-administered medications for this visit.     Facility-Administered Medications Ordered in Other Visits   Medication     sodium chloride (PF) 0.9% PF flush 10 mL        Allergies   Allergen Reactions     Contrast Dye Anaphylaxis     Fish Allergy Anaphylaxis     Iodine Anaphylaxis     Oxycodone Other (See Comments)     Severe suicidal tendencies on this medication     Tree Nuts [Nuts] Anaphylaxis     Tree nuts only (peanuts ok)     Bactrim      Increased uric acid     Betadine [Povidone Iodine] " Hives, Swelling and Difficulty breathing     Betadine     Combivent      Rash     Dulaglutide Other (See Comments)     Hx . Of thyroid cancer.     Fish      Lisinopril Other (See Comments)     Scr/grf severely reduced.      Penicillins Rash     Allopurinol Rash     Latex Rash     Wool Fiber Rash       Care Coordination Start Date: 4/6/2020   Frequency of Care Coordination: monthly     Form Last Updated: 09/20/2022       Home Care Agency List for PCA services    [facility]    [phone]    [facility]    [phone]    [facility]   [phone]    [facility]  [phone]    [facility]   [phone]

## 2022-09-20 NOTE — LETTER
[facility] CARE COORDINATION  [address]  [place] [address]    September 20, 2022    Mariel Quiros Coler  [address] [place] [address]      Dear Mariel Jacobo,        I am a clinic care coordinator who works with CHANTAL Grace CNP with the [facility]. I wanted to introduce myself and provide you with my contact information for you to be able to call me with any questions or concerns. Below is a description of clinic care coordination and how I can further assist you.       The clinic care coordination team is made up of a registered nurse, , financial resource worker and community health worker who understand the health care system. The goal of clinic care coordination is to help you manage your health and improve access to the health care system. Our team works alongside your provider to assist you in determining your health and social needs. We can help you obtain health care and community resources, providing you with necessary information and education. We can work with you through any barriers and develop a care plan that helps coordinate and strengthen the communication between you and your care team.    Please feel free to contact me with any questions or concerns regarding care coordination and what we can offer.      We are focused on providing you with the highest-quality healthcare experience possible.    Sincerely,     Kayla Salazar RN, BSN, CPHN, CM  [facility] Ambulatory Care Management  [facility] Family and   Phone: [phone]  Email: [email or web address]      Enclosed: I have enclosed a copy of the Patient Centered Plan of Care. This has helpful information and goals that we have talked about. Please keep this in an easy to access place to use as needed. and a list of home care companies that have PCA services available through private pay.

## 2022-09-22 ENCOUNTER — VIRTUAL VISIT (OUTPATIENT)
Dept: PALLIATIVE CARE | Facility: CLINIC | Age: 77
End: 2022-09-22
Attending: INTERNAL MEDICINE
Payer: MEDICARE

## 2022-09-22 ENCOUNTER — TELEPHONE (OUTPATIENT)
Dept: FAMILY MEDICINE | Facility: CLINIC | Age: 77
End: 2022-09-22

## 2022-09-22 DIAGNOSIS — Z71.89 ADVANCE CARE PLANNING: ICD-10-CM

## 2022-09-22 DIAGNOSIS — N18.32 TYPE 2 DIABETES MELLITUS WITH STAGE 3B CHRONIC KIDNEY DISEASE, WITH LONG-TERM CURRENT USE OF INSULIN (H): Primary | ICD-10-CM

## 2022-09-22 DIAGNOSIS — R69 MULTIPLE COMORBID CONDITIONS: ICD-10-CM

## 2022-09-22 DIAGNOSIS — E11.22 TYPE 2 DIABETES MELLITUS WITH STAGE 3B CHRONIC KIDNEY DISEASE, WITH LONG-TERM CURRENT USE OF INSULIN (H): Primary | ICD-10-CM

## 2022-09-22 DIAGNOSIS — M54.50 BILATERAL LOW BACK PAIN WITHOUT SCIATICA, UNSPECIFIED CHRONICITY: ICD-10-CM

## 2022-09-22 DIAGNOSIS — Z79.4 TYPE 2 DIABETES MELLITUS WITH STAGE 3B CHRONIC KIDNEY DISEASE, WITH LONG-TERM CURRENT USE OF INSULIN (H): Primary | ICD-10-CM

## 2022-09-22 PROCEDURE — 99205 OFFICE O/P NEW HI 60 MIN: CPT | Mod: 95 | Performed by: INTERNAL MEDICINE

## 2022-09-22 NOTE — TELEPHONE ENCOUNTER
Malika Wolf -  Please advise on note below.   Thanks so much.     Odalis Phoenix, BSN RN  [facility]

## 2022-09-22 NOTE — LETTER
9/22/2022       RE: Mariel Ribeiro  [address] [place] [address]     Dear Colleague,    Thank you for referring your patient, Mariel Ribeiro, to the [facility] at [facility]. Please see a copy of my visit note below.      Palliative Care Outpatient Clinic      Patient ID:  Medical - She has CKD3b, DM2, HF, hx TIA, CAD s/p CABG, COPD, memory loss NOS, chronic coccygeal pain, fibromyalgia    Social - Lives with her roommate Odalis who is her primary caregiver and health care agent. Has many sibs locally and around the country who are involved in her life; no children. Odalis continues to work, from home as a .    Care Planning - +HCD, names Odalis as her agent. Has a full code POLST.       History:  History gathered today from: patient, family/loved ones, medical chart  She is with Odalis who introduces herself as Mariel's 'roommate and primary historian.'    This summer ended up in hospital, then T[facility], then [facility].  She is also having 'cognitive challenges'--neuropsych testing in April showed deficits, not Alzheimers however; sees a neurologist in Oct  Odalis asks for help with getting Mariel a hospital bed. Mariel has problems falling and with mobility.  Odalis can't get Mariel off the ground if she falls and they need to call 911    She has chronic pain from fibromyalgia and is on pregabalin and tramadol, duloxetine; has seen pain clinic, done PT, had injections including I believe an ganglion impar block. Has chronic tailbone pain also; MRI have shown coccygeitis. On opioids remotely.    Odalis is worried about Mariel's mood and wonders if she can get counseling.    Mariel's brother in law in [place] it sounds like is in a sort of comprehensive home based palliative care program for patients with advanced illness and they were hoping [facility] had such a program or something similar.    No kids.  Sibs involved in her life; various around the  country and locally  Has HCD; paperwork; Odalis is her agent    Odalis feels she is managing ok as a caregiver; but definitely looking for more help.    PE: There were no vitals taken for this visit.   Wt Readings from Last 3 Encounters:   09/13/22 (!) 158.8 kg (350 lb)   08/02/22 142.6 kg (314 lb 6.4 oz)   07/30/22 144.3 kg (318 lb 3.2 oz)     Alert NAD  Odalis does most of the talking      Data reviewed:  I reviewed recent labs and imaging, my comments:  Cr 1.45. Ca 10    Echo March  Left ventricular size is normal.  The visual ejection fraction is 45-50%.  Right ventricular function, chamber size, wall motion, and thickness are  normal.  Both atria appear normal.  Pulmonary artery systolic pressure is normal.  The inferior vena cava is normal.  No pericardial effusion is present.  No significant changes noted.    Brain MRI July--~unremarkable  Prior sacral MRIs have shown precoccygeal inflammation     database reviewed: y      Impression & Recommendations:  76 yo with multiple comorbidities particularly DM2, CKD3b, hx TIA; fraility; mobility impairments; memory loss NOS; chronic pain from FM + coccygeitis    Had an extensive discussion with Odalis about Mariel's situation.    Importantly, I let them know that [facility] does not have any sort of home palliative care program like Mariel's [facility] in [place] is receiving. Other systems in [facility] do ([facility] & [facility]). And there are some private ones ([facility], [facility], [facility])--but I think usually the private ones are based on Medicare Advantage contracts and it's not clear to me if they'd take Mariel even though she'd certainly medically qualify. I suggested to Odalis that she give them a call. I think she'd need to transfer primary care to [facility] to receive [facility] home based palliative care. So--a lot of what they were hoping for we can't help with unfortunately. She should continue to keep her medical home with her PCP/clinic. Odalis seems to be managing well as a caregiver but also  wisely is planning for needing more help and is looking into paid caregiving in the home ([facility] [place] etc).     Will ask one of our palliative SW psychotherapists to meet Mariel and help with mood, grief, adjustment to illness.    Odalis asked about her pain: I reviewed her extensive pain hx in the chart, she seems to have received comprehensive and thoughtful pain care; she cannot use NSAIDs due to CKD and doesn't want to be on opioids as she was on them remotely and worked hard to discontinue them (nor are they indicated). I don't have anything to add to her excellent pain care.     Reviewed care planning basics--they've taken care of all that. Odalis's a , notably.     I hope this was a helpful visit for them. Follow-up with me as needed; happy to see her back at any time.     62 minutes spent on the date of the encounter doing chart review, history and exam, patient education & counseling, documentation and other activities as noted above.    Thank you for involving us in the patient's care.     Ronny Raines MD / Palliative Medicine / Text me via [facility].

## 2022-09-22 NOTE — TELEPHONE ENCOUNTER
General Call      Reason for Call:  Concerns/Symptoms     What are your questions or concerns:  Couple reasons for call;  2ish weeks ago pt was seen in UC for a skin infection to RT leg and was also started on antibiotics for UTI-Keflex. Per Na pt has area on RT leg that is  to touch. Should pt get Doxycycline .   Today patient is also complaining of burning with urination and blood in urine. They are wondering if there should be a follow up UA to check on UTI still being present.    Follow up with Na [phone]    Date of last appointment with provider: 8/15 telephone

## 2022-09-22 NOTE — PROGRESS NOTES
Mariel is a 77 year old who is being evaluated via a billable video visit.      How would you like to obtain your AVS? MyChart  If the video visit is dropped, the invitation should be resent by: Send to e-mail at: [email or web address]  Will anyone else be joining your video visit? Yes: Roomate Odalis. How would they like to receive their invitation?       Hansel Hayes     Palliative Care Outpatient Clinic      Patient ID:  Medical - She has CKD3b, DM2, HF, hx TIA, CAD s/p CABG, COPD, memory loss NOS, chronic coccygeal pain, fibromyalgia    Social - Lives with her roommate Odalis who is her primary caregiver and health care agent. Has many sibs locally and around the country who are involved in her life; no children. Odalis continues to work, from home as a .    Care Planning - +HCD, names Odalis as her agent. Has a full code POLST.       History:  History gathered today from: patient, family/loved ones, medical chart  She is with Odalis who introduces herself as Mariel's 'roommate and primary historian.'    This summer ended up in hospital, then [facility], then [facility].  She is also having 'cognitive challenges'--neuropsych testing in April showed deficits, not Alzheimers however; sees a neurologist in Oct  Odalis asks for help with getting Mariel a hospital bed. Mariel has problems falling and with mobility.  Odalis can't get Mariel off the ground if she falls and they need to call 911    She has chronic pain from fibromyalgia and is on pregabalin and tramadol, duloxetine; has seen pain clinic, done PT, had injections including I believe an ganglion impar block. Has chronic tailbone pain also; MRI have shown coccygeitis. On opioids remotely.    Odalis is worried about Mariel's mood and wonders if she can get counseling.    Mariel's brother in law in [place] it sounds like is in a sort of comprehensive home based palliative care program for patients with advanced illness and they were hoping [facility] had such a program or  something similar.    No kids.  Sibs involved in her life; various around the country and locally  Has HCD; paperwork; Odalis is her agent    Odalis feels she is managing ok as a caregiver; but definitely looking for more help.    PE: There were no vitals taken for this visit.   Wt Readings from Last 3 Encounters:   09/13/22 (!) 158.8 kg (350 lb)   08/02/22 142.6 kg (314 lb 6.4 oz)   07/30/22 144.3 kg (318 lb 3.2 oz)     Alert NAD  Odalis does most of the talking      Data reviewed:  I reviewed recent labs and imaging, my comments:  Cr 1.45. Ca 10    Echo March  Left ventricular size is normal.  The visual ejection fraction is 45-50%.  Right ventricular function, chamber size, wall motion, and thickness are  normal.  Both atria appear normal.  Pulmonary artery systolic pressure is normal.  The inferior vena cava is normal.  No pericardial effusion is present.  No significant changes noted.    Brain MRI July--~unremarkable  Prior sacral MRIs have shown precoccygeal inflammation     database reviewed: y      Impression & Recommendations:  78 yo with multiple comorbidities particularly DM2, CKD3b, hx TIA; fraility; mobility impairments; memory loss NOS; chronic pain from FM + coccygeitis    Had an extensive discussion with Odalis about Mariel's situation.    Importantly, I let them know that [facility] does not have any sort of home palliative care program like Mariel's [facility] in [place] is receiving. Other systems in [facility] do ([facility] & [facility]). And there are some private ones ([facility], [facility], [facility])--but I think usually the private ones are based on Medicare Advantage contracts and it's not clear to me if they'd take Mariel even though she'd certainly medically qualify. I suggested to Odalis that she give them a call. I think she'd need to transfer primary care to [facility] to receive [facility] based [facility] care. So--a lot of what they were hoping for we can't help with unfortunately. She should continue to keep her medical home  with her PCP/clinic. Odalis seems to be managing well as a caregiver but also wisely is planning for needing more help and is looking into paid caregiving in the home ([facility] [place] etc).     Will ask one of our palliative SW psychotherapists to meet Mariel and help with mood, grief, adjustment to illness.    Odalis asked about her pain: I reviewed her extensive pain hx in the chart, she seems to have received comprehensive and thoughtful pain care; she cannot use NSAIDs due to CKD and doesn't want to be on opioids as she was on them remotely and worked hard to discontinue them (nor are they indicated). I don't have anything to add to her excellent pain care.     Reviewed care planning basics--they've taken care of all that. Odalis's a , notably.     I hope this was a helpful visit for them. Follow-up with me as needed; happy to see her back at any time.     62 minutes spent on the date of the encounter doing chart review, history and exam, patient education & counseling, documentation and other activities as noted above.    Thank you for involving us in the patient's care.   Ronny Raines MD / Palliative Medicine / [employer] via [facility].      Video-Visit Details    Video Start Time: 1:07 PM    Type of service:  Video Visit    Video End Time:135p    Originating Location (pt. Location): Home    Distant Location (provider location):  Home    Platform used for Video Visit: Guy

## 2022-09-22 NOTE — TELEPHONE ENCOUNTER
Writer called and left message on patient's voicemail to call back and speak with a triage nurse if unable to attend appointment on Monday, 9/26 @ 1:50 pm in the clinic with Malika Wolf.     SONIA EscobarN RN  [facility]

## 2022-09-22 NOTE — NURSING NOTE
Patient declined individual allergy and medication review by support staff because pt states everything was up-to-date during echeck-in.    Hansel Hayes VF

## 2022-09-23 ENCOUNTER — MYC MEDICAL ADVICE (OUTPATIENT)
Dept: FAMILY MEDICINE | Facility: CLINIC | Age: 77
End: 2022-09-23

## 2022-09-25 DIAGNOSIS — N39.46 MIXED INCONTINENCE: ICD-10-CM

## 2022-09-26 ENCOUNTER — MYC MEDICAL ADVICE (OUTPATIENT)
Dept: FAMILY MEDICINE | Facility: CLINIC | Age: 77
End: 2022-09-26

## 2022-09-26 ENCOUNTER — OFFICE VISIT (OUTPATIENT)
Dept: FAMILY MEDICINE | Facility: CLINIC | Age: 77
End: 2022-09-26
Payer: MEDICARE

## 2022-09-26 VITALS
TEMPERATURE: 98.1 F | RESPIRATION RATE: 12 BRPM | DIASTOLIC BLOOD PRESSURE: 60 MMHG | HEART RATE: 80 BPM | SYSTOLIC BLOOD PRESSURE: 122 MMHG

## 2022-09-26 DIAGNOSIS — R30.9 URINARY PAIN: ICD-10-CM

## 2022-09-26 DIAGNOSIS — Z23 HIGH PRIORITY FOR 2019-NCOV VACCINE: ICD-10-CM

## 2022-09-26 DIAGNOSIS — Z23 NEED FOR PROPHYLACTIC VACCINATION AND INOCULATION AGAINST INFLUENZA: ICD-10-CM

## 2022-09-26 DIAGNOSIS — B37.31 CANDIDIASIS OF VULVA AND VAGINA: ICD-10-CM

## 2022-09-26 DIAGNOSIS — L81.9 DISCOLORATION OF SKIN OF MULTIPLE SITES OF LOWER EXTREMITY: Primary | ICD-10-CM

## 2022-09-26 LAB
ALBUMIN UR-MCNC: 30 MG/DL
APPEARANCE UR: CLEAR
BACTERIA #/AREA URNS HPF: ABNORMAL /HPF
BILIRUB UR QL STRIP: NEGATIVE
COLOR UR AUTO: YELLOW
GLUCOSE UR STRIP-MCNC: NEGATIVE MG/DL
HGB UR QL STRIP: ABNORMAL
HYALINE CASTS #/AREA URNS LPF: ABNORMAL /LPF
KETONES UR STRIP-MCNC: NEGATIVE MG/DL
LEUKOCYTE ESTERASE UR QL STRIP: ABNORMAL
NITRATE UR QL: NEGATIVE
PH UR STRIP: 5 [PH] (ref 5–7)
RBC #/AREA URNS AUTO: ABNORMAL /HPF
SP GR UR STRIP: 1.01 (ref 1–1.03)
SQUAMOUS #/AREA URNS AUTO: ABNORMAL /LPF
UROBILINOGEN UR STRIP-ACNC: 0.2 E.U./DL
WBC #/AREA URNS AUTO: ABNORMAL /HPF

## 2022-09-26 PROCEDURE — 81001 URINALYSIS AUTO W/SCOPE: CPT | Performed by: NURSE PRACTITIONER

## 2022-09-26 PROCEDURE — 90662 IIV NO PRSV INCREASED AG IM: CPT | Performed by: NURSE PRACTITIONER

## 2022-09-26 PROCEDURE — 99214 OFFICE O/P EST MOD 30 MIN: CPT | Mod: 25 | Performed by: NURSE PRACTITIONER

## 2022-09-26 PROCEDURE — 91312 COVID-19,PF,PFIZER BOOSTER BIVALENT: CPT | Performed by: NURSE PRACTITIONER

## 2022-09-26 PROCEDURE — G0008 ADMIN INFLUENZA VIRUS VAC: HCPCS | Mod: 59 | Performed by: NURSE PRACTITIONER

## 2022-09-26 PROCEDURE — 0124A COVID-19,PF,PFIZER BOOSTER BIVALENT: CPT | Performed by: NURSE PRACTITIONER

## 2022-09-26 RX ORDER — TOLTERODINE 2 MG/1
CAPSULE, EXTENDED RELEASE ORAL
Qty: 180 CAPSULE | Refills: 0 | Status: SHIPPED | OUTPATIENT
Start: 2022-09-26 | End: 2023-01-16

## 2022-09-26 RX ORDER — NYSTATIN 100000 U/G
OINTMENT TOPICAL 2 TIMES DAILY
Qty: 30 G | Refills: 3 | Status: ON HOLD | OUTPATIENT
Start: 2022-09-26 | End: 2024-01-01

## 2022-09-26 ASSESSMENT — PATIENT HEALTH QUESTIONNAIRE - PHQ9
SUM OF ALL RESPONSES TO PHQ QUESTIONS 1-9: 7
10. IF YOU CHECKED OFF ANY PROBLEMS, HOW DIFFICULT HAVE THESE PROBLEMS MADE IT FOR YOU TO DO YOUR WORK, TAKE CARE OF THINGS AT HOME, OR GET ALONG WITH OTHER PEOPLE: NOT DIFFICULT AT ALL
SUM OF ALL RESPONSES TO PHQ QUESTIONS 1-9: 7

## 2022-09-26 NOTE — TELEPHONE ENCOUNTER
Writer responded via [employer].    SONIA RobertsN, RN  [facility]

## 2022-09-26 NOTE — PROGRESS NOTES
Assessment & Plan     Discoloration of skin of multiple sites of lower extremity  Areas of concern seem most consistent with bruising or hyperpigmentation. No obvoius signs of infection.  Reassurance provided.      Candidiasis of vulva and vagina  External vulva excoriated causing bleeding and yeast present. She has been able to successfully treat groin folds with nystatin powder and we will prescribe an ointment for the vaginal area.    - nystatin (MYCOSTATIN) [id number] UNIT/GM external ointment; Apply topically 2 times daily Apply to external vagina 2 times daily.    Urinary pain  Recently treated for UTI so we will hold off on re-treating.  UTI appears to improved from previous.  - UA Macro with Reflex to Micro and Culture - lab collect; Future  - UA Macro with Reflex to Micro and Culture - lab collect  - Urine Microscopic    Need for prophylactic vaccination and inoculation against influenza  - INFLUENZA, QUAD, HIGH DOSE, PF, 65YR + (FLUZONE HD)    High priority for 2019-nCoV vaccine  - COVID-19,PF,PFIZER BOOSTER BIVALENT 12+Yrs      I spent a total of 30 minutes on the day of the visit.   Time spent doing chart review, history and exam, documentation and further activities per the note        No follow-ups on file.   Follow-up Visit   Expected date:  Nov 26, 2022 (Approximate)      Follow Up Appointment Details:     Follow-up with whom?: Me    Follow-Up for what?: Medicare Wellness    Welcome or Annual?: Annual Wellness    How?: Video    Is this an as-needed follow-up?: No                    CHANTAL Grace CNP  [facility]    Breana Floyd is a 77 year old accompanied by her friend, presenting for the following health issues:  Derm Problem (RIGHT LEG [id number], NO CHANGES IN SIZE, PAINFUL, NO ICHINESS), Imm/Inj (Flu Shot), and Imm/Inj (COVID-19 VACCINE)      History of Present Illness       Reason for visit:  Derm concern    She eats 2-3 servings of fruits and vegetables  daily.She consumes 0 sweetened beverage(s) daily.She exercises with enough effort to increase her heart rate 10 to 19 minutes per day.  She exercises with enough effort to increase her heart rate 4 days per week.   She is taking medications regularly.    Today's PHQ-9         PHQ-9 Total Score: 7    PHQ-9 Q9 Thoughts of better off dead/self-harm past 2 weeks :   Not at all    How difficult have these problems made it for you to do your work, take care of things at home, or get along with other people: Not difficult at all       Concerned about darkened areas on RLE.  Was previously treated for cellulitis.  Areas are marked, have not extended.  Painful only to palpation.      Noticing blood on depends.  Not a lot, no clots.    Recently treated for UTI.      Review of Systems   Constitutional, HEENT, cardiovascular, pulmonary, gi and gu systems are negative, except as otherwise noted.      Objective    /60   Pulse 80   Temp 98.1  F (36.7  C) (Oral)   Resp 12   There is no height or weight on file to calculate BMI.  Physical Exam   GENERAL: healthy, alert and no distress  EYES: Eyes grossly normal to inspection, PERRL and conjunctivae and sclerae normal  HENT: ear canals and TM's normal, nose and mouth without ulcers or lesions  NECK: no adenopathy, no asymmetry, masses, or scars and thyroid normal to palpation  RESP: lungs clear to auscultation - no rales, rhonchi or wheezes  CV: regular rate and rhythm, normal S1 S2, no S3 or S4, no murmur, click or rub, no peripheral edema and peripheral pulses strong  ABDOMEN: soft, nontender, no hepatosplenomegaly, no masses and bowel sounds normal   (female): vaginal skin findings: excoriation on external vulva and yeast present.  MS: no gross musculoskeletal defects noted, no edema.  2 isolated areas of brown discoloration on RLE consistent with old bruising.  Diabetic foot exam: no trophic changes or ulcerative lesions and reduced sensation throughout

## 2022-09-26 NOTE — TELEPHONE ENCOUNTER
Writer responded via [employer].    SONIA RobertsN, RN  [facility]

## 2022-09-28 ENCOUNTER — TELEPHONE (OUTPATIENT)
Dept: FAMILY MEDICINE | Facility: CLINIC | Age: 77
End: 2022-09-28

## 2022-09-28 NOTE — TELEPHONE ENCOUNTER
Malika-Please review update from home care PT regarding patient's fall.  May close encounter when finished reviewing.    Thank you!  SONIA RobertsN, RN-BC  [facility]

## 2022-09-28 NOTE — TELEPHONE ENCOUNTER
Reason for Call:  Other Fall    Detailed comments: Reporting a fall on 9/26/2022  Fall from edge of bed and slide to floor  No reported injury and EMT were called to lift her back to bed    Phone Number Patient can be reached at: Other phone number:  Tricia  PT   [phone]    Best Time:     Can we leave a detailed message on this number? YES    Call taken on 9/28/2022 at 9:16 AM by Verna Schmitt PTA

## 2022-09-29 ENCOUNTER — TELEPHONE (OUTPATIENT)
Dept: FAMILY MEDICINE | Facility: CLINIC | Age: 77
End: 2022-09-29

## 2022-09-29 ENCOUNTER — CARE COORDINATION (OUTPATIENT)
Dept: CARDIOLOGY | Facility: CLINIC | Age: 77
End: 2022-09-29

## 2022-09-29 NOTE — PROGRESS NOTES
[facility] - CardioMEMS Reading Review    September 29, 2022   CardioMEMS reviewed.  Current diuretic: Bumex 4 mg daily  Recent plan of care changes: Cardiomems in goal range. Patient not using enough to trend numbers. Mychart sent to encourage using more frequently.     Current Threshold parameters:       Today's Waveform:       Readings:           RHC Date Wedge Pressure MEMS PAD during cath  (average of the 3 values)     7/1/2019 w/MEMS implant     20 mmHg   18 mmHg           Rosina Mays RN

## 2022-09-29 NOTE — TELEPHONE ENCOUNTER
Left message on Debbi's secure voice mail with verbal authorization to continue OT as below.  Ting Andrade RN  [facility]

## 2022-09-29 NOTE — TELEPHONE ENCOUNTER
Reason for Call: Other    Detailed comments: [facility] is requesting order for -  Continued OT  2 x week for 3 weeks    Phone Number Patient can be reached at: [phone]    Best Time: Any    Can we leave a detailed message on this number? YES    Call taken on 9/29/2022 at 10:43 AM by Korin Castro

## 2022-09-30 ENCOUNTER — ALLIED HEALTH/NURSE VISIT (OUTPATIENT)
Dept: CARDIOLOGY | Facility: CLINIC | Age: 77
End: 2022-09-30
Attending: NURSE PRACTITIONER
Payer: MEDICARE

## 2022-09-30 DIAGNOSIS — I50.30 (HFPEF) HEART FAILURE WITH PRESERVED EJECTION FRACTION (H): Primary | ICD-10-CM

## 2022-09-30 NOTE — PROGRESS NOTES
Patient has not used remote monitoring system minimum required days to meet criteria for billing, this is a non billable encounter. Message sent via [employer] encouraging more frequent use of Cardiomems. Will follow up at upcoming cardiology visit in October.   Rosina Mays RN
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
## 2022-10-03 ENCOUNTER — VIRTUAL VISIT (OUTPATIENT)
Dept: ONCOLOGY | Facility: CLINIC | Age: 77
End: 2022-10-03
Attending: INTERNAL MEDICINE
Payer: MEDICARE

## 2022-10-03 DIAGNOSIS — M62.81 GENERALIZED MUSCLE WEAKNESS: ICD-10-CM

## 2022-10-03 DIAGNOSIS — R41.89 COGNITIVE DECLINE: ICD-10-CM

## 2022-10-03 DIAGNOSIS — D05.12 DUCTAL CARCINOMA IN SITU (DCIS) OF LEFT BREAST: Primary | ICD-10-CM

## 2022-10-03 DIAGNOSIS — N18.32 STAGE 3B CHRONIC KIDNEY DISEASE (H): ICD-10-CM

## 2022-10-03 PROCEDURE — G0463 HOSPITAL OUTPT CLINIC VISIT: HCPCS | Mod: PN,RTG | Performed by: INTERNAL MEDICINE

## 2022-10-03 PROCEDURE — 99214 OFFICE O/P EST MOD 30 MIN: CPT | Mod: 95 | Performed by: INTERNAL MEDICINE
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
## 2022-10-05 ENCOUNTER — CARE COORDINATION (OUTPATIENT)
Dept: CARDIOLOGY | Facility: CLINIC | Age: 77
End: 2022-10-05

## 2022-10-05 NOTE — PROGRESS NOTES
[facility] - CardioMEMS Reading Review    October 5, 2022   CardioMEMS reviewed.  Current diuretic: Bumex 4 mg daily  Recent plan of care changes: none. Have mycharted encouraging more frequent readings to be sent.     Current Threshold parameters:       Today's Waveform:       Readings:           RHC Date Wedge Pressure MEMS PAD during cath  (average of the 3 values)     7/1/2019 w/MEMS implant     20 mmHg   18 mmHg           Rosina Mays RN

## 2022-10-07 ENCOUNTER — OFFICE VISIT (OUTPATIENT)
Dept: ORTHOPEDICS | Facility: CLINIC | Age: 77
End: 2022-10-07
Payer: MEDICARE

## 2022-10-07 DIAGNOSIS — I73.89 OTHER SPECIFIED PERIPHERAL VASCULAR DISEASES (H): ICD-10-CM

## 2022-10-07 DIAGNOSIS — L60.2 LONG TOENAIL: ICD-10-CM

## 2022-10-07 DIAGNOSIS — I73.9 PVD (PERIPHERAL VASCULAR DISEASE) (H): ICD-10-CM

## 2022-10-07 DIAGNOSIS — B35.1 OM (ONYCHOMYCOSIS): Primary | ICD-10-CM

## 2022-10-07 DIAGNOSIS — E78.5 HYPERLIPIDEMIA WITH TARGET LDL LESS THAN 70: ICD-10-CM

## 2022-10-07 PROCEDURE — 99213 OFFICE O/P EST LOW 20 MIN: CPT | Mod: 25 | Performed by: PODIATRIST

## 2022-10-07 PROCEDURE — 11720 DEBRIDE NAIL 1-5: CPT | Mod: Q8 | Performed by: PODIATRIST

## 2022-10-07 RX ORDER — NIACIN 500 MG/1
TABLET, EXTENDED RELEASE ORAL
Qty: 180 TABLET | Refills: 0 | Status: SHIPPED | OUTPATIENT
Start: 2022-10-07 | End: 2022-12-08

## 2022-10-07 NOTE — TELEPHONE ENCOUNTER
Prescription approved per [facility] Refill Protocol.  CHEN Lopez RN  [facility]
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
## 2022-10-11 ENCOUNTER — TELEPHONE (OUTPATIENT)
Dept: FAMILY MEDICINE | Facility: CLINIC | Age: 77
End: 2022-10-11

## 2022-10-11 ASSESSMENT — HEADACHE IMPACT TEST (HIT 6)
HOW OFTEN DO HEADACHES LIMIT YOUR DAILY ACTIVITIES: SOMETIMES
HIT6 TOTAL SCORE: 63
WHEN YOU HAVE HEADACHES HOW OFTEN IS THE PAIN SEVERE: SOMETIMES
HOW OFTEN DID HEADACHS LIMIT CONCENTRATION ON WORK OR DAILY ACTIVITY: VERY OFTEN
HOW OFTEN HAVE YOU FELT FED UP OR IRRITATED BECAUSE OF YOUR HEADACHES: VERY OFTEN
HOW OFTEN HAVE YOU FELT TOO TIRED TO WORK BECAUSE OF YOUR HEADACHES: SOMETIMES
WHEN YOU HAVE A HEADACHE HOW OFTEN DO YOU WISH YOU COULD LIE DOWN: VERY OFTEN

## 2022-10-11 NOTE — TELEPHONE ENCOUNTER
Hold for pcp.    Tico Macedo, DO     Attending. The patient voicedunderstanding that I was a Mid-Level Provider and was in agreement with being seen independently by myself. Scribe:  Agnes Deng 10/26/18 3:45 PM Scribing for and in the presence of [address]. Signed by: Chidi Allen, 10/27/18 10:16 PM    Provider:  I personally performed the services described in the documentation, reviewed and edited the documentation which was dictated to the scribe in my presence, and it accurately records my words and actions.     Raheel Vera PA-C 10/26/18 10:16 PM        Linnette Palencia PA-C  10/27/18 [id number]

## 2022-10-11 NOTE — TELEPHONE ENCOUNTER
Ora from [facility] is calling to report that patient had a fall this morning, 10/11/22.    Patient is reporting increased knee pain.  She fell onto her knees from the bed.  No redness, swelling, warmth to the area.  Soreness to palpation.  Patient is ambulating to and from the bathroom with her walker, which she uses alternately with cane.    Ora: [phone]    This is just an FYI.  Home care will be back on Thursday, 10/13/22.    Evonne Brannon, SONIAN, RN

## 2022-10-12 ENCOUNTER — PRE VISIT (OUTPATIENT)
Dept: NEUROLOGY | Facility: CLINIC | Age: 77
End: 2022-10-12

## 2022-10-12 ENCOUNTER — OFFICE VISIT (OUTPATIENT)
Dept: NEUROLOGY | Facility: CLINIC | Age: 77
End: 2022-10-12
Payer: MEDICARE

## 2022-10-12 VITALS
DIASTOLIC BLOOD PRESSURE: 72 MMHG | SYSTOLIC BLOOD PRESSURE: 137 MMHG | BODY MASS INDEX: 47.24 KG/M2 | OXYGEN SATURATION: 95 % | RESPIRATION RATE: 16 BRPM | WEIGHT: 293 LBS | HEART RATE: 62 BPM

## 2022-10-12 DIAGNOSIS — G43.709 CHRONIC MIGRAINE WITHOUT AURA WITHOUT STATUS MIGRAINOSUS, NOT INTRACTABLE: Primary | ICD-10-CM

## 2022-10-12 DIAGNOSIS — I50.30 (HFPEF) HEART FAILURE WITH PRESERVED EJECTION FRACTION (H): Primary | ICD-10-CM

## 2022-10-12 PROCEDURE — 99215 OFFICE O/P EST HI 40 MIN: CPT | Performed by: NURSE PRACTITIONER

## 2022-10-12 ASSESSMENT — PAIN SCALES - GENERAL: PAINLEVEL: NO PAIN (0)

## 2022-10-12 NOTE — PATIENT INSTRUCTIONS
Headache chronic  -possible chronic migraine   Brain MRI reviewed -no acute findings  TA biopsy -no vasculitis   Focus on headache prevention and Botox appear to be favorable due to mainly local effect and almost none to rare risk of systemic effect.   Alternatively may consider non pharmacological treatment -acupuncture   Rescue treatment -acetaminophen as needed but limit use to no more than 14 days per month.    Vit B2 200 mg daily -has a mild evidence to help with migraine headache prevention   Follow up -let me know what do you decide about Botox otherwise -as needed if headaches were changing -new symptoms      Dysequilibrium/generalized weakness/falls/cognitive decline:  Brain MRI in July was without malignancy and showed mild cerebral atrophy and chronic small vessel ischemic disease  Needs to see a general neurology

## 2022-10-12 NOTE — PROGRESS NOTES
ASSESSMENT AND PLAN:  Headache chronic  -possible chronic migraine in phenotype. No apparent red flags.  Brain MRI reviewed -no acute findings to explain symptoms   Brain MRI in July was without malignancy and showed mild cerebral atrophy and chronic small vessel ischemic disease  TA biopsy -no vasculitis   Focus on headache prevention and Botox appear to be favorable due to mainly local effect and almost none to rare risk of systemic effect.   Alternatively may consider non pharmacological treatment -acupuncture   Rescue treatment -acetaminophen as needed but limit use to no more than 14 days per month.    Vit B2 200 mg daily -has a mild evidence to help with migraine headache prevention   Follow up -let me know what do you decide about Botox otherwise -as needed if headaches were changing -new symptoms    Dysequilibrium/generalized weakness/falls/cognitive decline: Needs to see a general neurology    Subjective   Mariel is a 77 year old  accompanied by her friend/roommate for many years -Odalis Lowry, presenting for the following health issues:  Consult (NEW HEADACHE)  Past medical history including Hypothyroidism, HTN, DM Type II, CAD s/p PCIx2, CKD Stage III, BELEN, Fibromyalgia, thyroid CA, Gout, GERD, recurrent syncope, COPD, and Hyperlipidemia, She underwent LHC due to excessive TOWNSEND after undergoing evaluation per Dr. Wolf, which was positive for 3 vessel CAD  Biopsy of area in 11/2019 of calcifications was c/w ER positive, NM positive, grade II, papillary and solid type DCIS  HPI   Headaches since 18 years old. Headaches more in the last 1-2 years. Brain MRI in July -no new changes.   Headache is loc temples, supraorbital and bridge of the nose. Eyes hurting and watering. Light and noise sensitivity, nausea. Headaches may be half of the month. Not able to do activity and cannot do a lot when headache is severe. Sleeps long hours and uses CPAP.   Sometimes feels frustrated with chronic pain and headaches.    FH-mother has headaches.     DATA reviewed  6/14/2021-FINAL DIAGNOSIS:   Temporal artery, left, biopsy:   1. No evidence of giant cell arteritis.   2. Moderate arteriosclerosis.   3. Moderate medial calcific sclerosis.     Ophthalmology neuro-6/10/2021-with Dr Ramos-note reviewed  Dr Greene-Neurology 3/8/2021    Data:  MRI brain without contrast     Provided History:  Word finding difficulty, worsening balance.     Comparison:  Brain MRI 10/25/2018, head CT 7/12/2022       Technique:   Sagittal T1, axial diffusion-weighted, axial T2 fat sat, axial FLAIR  fat sat, axial susceptibility weighted, and coronal T2 fat sat  weighted images obtained without contrast.     Findings:   Diffusion-weighted images demonstrate no acute infarct. Mild  leukoaraiosis, slightly progressed since 2018. Few similar scattered  foci of punctate susceptibility artifact, for example in the right  posterior corona radiata, consistent with old microhemorrhage. No  evidence for acute intracranial hemorrhage. No mass effect or midline  shift. Mild cerebral atrophy, normal for age. No hydrocephalus. The  major intracranial arterial flow voids are intact.     Bilateral pseudophakia. Mucous retention cyst in the left frontal  sinus. Mild mucosal thickening in the ethmoid air cells. Mastoid air  cells are clear.                                                                      Impression:  1. No acute infarct or other acute intracranial findings.  2. Stable mild cerebral atrophy and chronic small vessel ischemic  disease.           Objective    /72   Pulse 62   Resp 16   Wt 136.8 kg (301 lb 9.6 oz)   SpO2 95%   BMI 47.24 kg/m    Body mass index is 47.24 kg/m .  Physical Exam   In W/C, Patient is alert and no in apparent acute distress,  mentation appears normal,normal speech.    I discussed all my recommendations with Mariel Ribeiro who verbalizes understanding and comfortable with the plan.  All of patient's questions were answered  from the best of my knowledge.  Patient is in agreement with the plan.     45 minutes spent on the date of the encounter doing face  access, chart  review, exam, results review,  meds review, treatment plan, documentation and further activities as noted above    CHANTAL Schulz, CNP [facility]  Headache certified  [facility]

## 2022-10-12 NOTE — LETTER
10/12/2022       RE: Mariel Ribeiro  [address] [place] [address]     Dear Colleague,    Thank you for referring your patient, Mariel Ribeiro, to the [facility] [place] [facility]. Please see a copy of my visit note below.    ASSESSMENT AND PLAN:  Headache chronic  -possible chronic migraine in phenotype. No apparent red flags.  Brain MRI reviewed -no acute findings to explain symptoms   Brain MRI in July was without malignancy and showed mild cerebral atrophy and chronic small vessel ischemic disease  TA biopsy -no vasculitis   Focus on headache prevention and Botox appear to be favorable due to mainly local effect and almost none to rare risk of systemic effect.   Alternatively may consider non pharmacological treatment -acupuncture   Rescue treatment -acetaminophen as needed but limit use to no more than 14 days per month.    Vit B2 200 mg daily -has a mild evidence to help with migraine headache prevention   Follow up -let me know what do you decide about Botox otherwise -as needed if headaches were changing -new symptoms    Dysequilibrium/generalized weakness/falls/cognitive decline: Needs to see a general neurology    Subjective   Mariel is a 77 year old  accompanied by her friend/roommate for many years -Odalis Lowry, presenting for the following health issues:  Consult (NEW HEADACHE)  Past medical history including Hypothyroidism, HTN, DM Type II, CAD s/p PCIx2, CKD Stage III, BELEN, Fibromyalgia, thyroid CA, Gout, GERD, recurrent syncope, COPD, and Hyperlipidemia, She underwent LHC due to excessive TOWNSEND after undergoing evaluation per Dr. Wolf, which was positive for 3 vessel CAD  Biopsy of area in 11/2019 of calcifications was c/w ER positive, OK positive, grade II, papillary and solid type DCIS  HPI   Headaches since 18 years old. Headaches more in the last 1-2 years. Brain MRI in July -no new changes.   Headache is loc  temples, supraorbital and bridge of the nose. Eyes hurting and watering. Light and noise sensitivity, nausea. Headaches may be half of the month. Not able to do activity and cannot do a lot when headache is severe. Sleeps long hours and uses CPAP.   Sometimes feels frustrated with chronic pain and headaches.   FH-mother has headaches.     DATA reviewed  6/14/2021-FINAL DIAGNOSIS:   Temporal artery, left, biopsy:   1. No evidence of giant cell arteritis.   2. Moderate arteriosclerosis.   3. Moderate medial calcific sclerosis.     Ophthalmology neuro-6/10/2021-with Dr Ramos-note reviewed  Dr Greene-Neurology 3/8/2021    Data:  MRI brain without contrast     Provided History:  Word finding difficulty, worsening balance.     Comparison:  Brain MRI 10/25/2018, head CT 7/12/2022       Technique:   Sagittal T1, axial diffusion-weighted, axial T2 fat sat, axial FLAIR  fat sat, axial susceptibility weighted, and coronal T2 fat sat  weighted images obtained without contrast.     Findings:   Diffusion-weighted images demonstrate no acute infarct. Mild  leukoaraiosis, slightly progressed since 2018. Few similar scattered  foci of punctate susceptibility artifact, for example in the right  posterior corona radiata, consistent with old microhemorrhage. No  evidence for acute intracranial hemorrhage. No mass effect or midline  shift. Mild cerebral atrophy, normal for age. No hydrocephalus. The  major intracranial arterial flow voids are intact.     Bilateral pseudophakia. Mucous retention cyst in the left frontal  sinus. Mild mucosal thickening in the ethmoid air cells. Mastoid air  cells are clear.                                                                      Impression:  1. No acute infarct or other acute intracranial findings.  2. Stable mild cerebral atrophy and chronic small vessel ischemic  disease.          Objective    /72   Pulse 62   Resp 16   Wt 136.8 kg (301 lb 9.6 oz)   SpO2 95%   BMI 47.24 kg/m    Body  mass index is 47.24 kg/m .  Physical Exam   In W/C, Patient is alert and no in apparent acute distress,  mentation appears normal,normal speech.    I discussed all my recommendations with Mariel Ribeiro who verbalizes understanding and comfortable with the plan.  All of patient's questions were answered from the best of my knowledge.  Patient is in agreement with the plan.     45 minutes spent on the date of the encounter doing face  access, chart  review, exam, results review,  meds review, treatment plan, documentation and further activities as noted above    CHANTAL Schulz, CNP [facility]  Headache certified  [facility]

## 2022-10-17 ENCOUNTER — MYC MEDICAL ADVICE (OUTPATIENT)
Dept: CARDIOLOGY | Facility: CLINIC | Age: 77
End: 2022-10-17

## 2022-10-17 DIAGNOSIS — I50.32 CHRONIC DIASTOLIC HEART FAILURE (H): Chronic | ICD-10-CM

## 2022-10-17 RX ORDER — LOSARTAN POTASSIUM 25 MG/1
25 TABLET ORAL DAILY
Qty: 90 TABLET | Refills: 1 | Status: SHIPPED | OUTPATIENT
Start: 2022-10-17 | End: 2023-04-05

## 2022-10-17 NOTE — TELEPHONE ENCOUNTER
Refill approved. Has CORE follow up this week.   Did fill 25 mg tabs so not needing to split pills. Called Odalis to let her know this so will be 1 full pill daily instead of half a 50 mg tablet.
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
## 2022-10-19 ENCOUNTER — OFFICE VISIT (OUTPATIENT)
Dept: CARDIOLOGY | Facility: CLINIC | Age: 77
End: 2022-10-19
Attending: INTERNAL MEDICINE
Payer: MEDICARE

## 2022-10-19 ENCOUNTER — LAB (OUTPATIENT)
Dept: LAB | Facility: CLINIC | Age: 77
End: 2022-10-19
Payer: MEDICARE

## 2022-10-19 VITALS
WEIGHT: 293 LBS | OXYGEN SATURATION: 97 % | DIASTOLIC BLOOD PRESSURE: 92 MMHG | HEART RATE: 65 BPM | BODY MASS INDEX: 47.14 KG/M2 | SYSTOLIC BLOOD PRESSURE: 162 MMHG

## 2022-10-19 DIAGNOSIS — E78.5 HYPERLIPIDEMIA, UNSPECIFIED HYPERLIPIDEMIA TYPE: ICD-10-CM

## 2022-10-19 DIAGNOSIS — I50.32 CHRONIC DIASTOLIC HEART FAILURE (H): ICD-10-CM

## 2022-10-19 DIAGNOSIS — I50.30 (HFPEF) HEART FAILURE WITH PRESERVED EJECTION FRACTION (H): ICD-10-CM

## 2022-10-19 DIAGNOSIS — E78.5 HYPERLIPIDEMIA, UNSPECIFIED HYPERLIPIDEMIA TYPE: Primary | ICD-10-CM

## 2022-10-19 LAB
ANION GAP SERPL CALCULATED.3IONS-SCNC: 12 MMOL/L (ref 7–15)
BUN SERPL-MCNC: 36.5 MG/DL (ref 8–23)
CALCIUM SERPL-MCNC: 10.5 MG/DL (ref 8.8–10.2)
CHLORIDE SERPL-SCNC: 102 MMOL/L (ref 98–107)
CHOLEST SERPL-MCNC: 185 MG/DL
CREAT SERPL-MCNC: 1.45 MG/DL (ref 0.51–0.95)
DEPRECATED HCO3 PLAS-SCNC: 28 MMOL/L (ref 22–29)
GFR SERPL CREATININE-BSD FRML MDRD: 37 ML/MIN/1.73M2
GLUCOSE SERPL-MCNC: 162 MG/DL (ref 70–99)
HDLC SERPL-MCNC: 54 MG/DL
LDLC SERPL CALC-MCNC: 101 MG/DL
NONHDLC SERPL-MCNC: 131 MG/DL
POTASSIUM SERPL-SCNC: 4 MMOL/L (ref 3.4–5.3)
SODIUM SERPL-SCNC: 142 MMOL/L (ref 136–145)
TRIGL SERPL-MCNC: 149 MG/DL

## 2022-10-19 PROCEDURE — 99214 OFFICE O/P EST MOD 30 MIN: CPT | Performed by: PHYSICIAN ASSISTANT

## 2022-10-19 PROCEDURE — G0463 HOSPITAL OUTPT CLINIC VISIT: HCPCS

## 2022-10-19 PROCEDURE — 80048 BASIC METABOLIC PNL TOTAL CA: CPT | Performed by: PATHOLOGY

## 2022-10-19 PROCEDURE — 80061 LIPID PANEL: CPT | Performed by: PHYSICIAN ASSISTANT

## 2022-10-19 PROCEDURE — 36415 COLL VENOUS BLD VENIPUNCTURE: CPT | Performed by: PATHOLOGY

## 2022-10-19 ASSESSMENT — PAIN SCALES - GENERAL: PAINLEVEL: NO PAIN (0)

## 2022-10-19 NOTE — NURSING NOTE
Diet: Patient instructed regarding a heart healthy diet, including discussion of reduced fat and sodium intake. Patient demonstrated understanding of this information and agreed to call with further questions or concerns.  Labs: Patient was given results of the laboratory testing obtained today. . Patient demonstrated understanding of this information and agreed to call with further questions or concerns.   Return Appointment: Patient given instructions regarding scheduling next clinic visit. Patient demonstrated understanding of this information and agreed to call with further questions or concerns. Patient asked us to call to schedule and will have schedulers reach out.      Message sent to [employer] reps to see if they can help with troubleshooting positioning on pillows.      Rosina Mays RN
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
## 2022-10-19 NOTE — PATIENT INSTRUCTIONS
Take your medicines every day, as directed    Changes made today:  No medication changes today    Use your mems at least 5 times per week, if it is getting too hard and hard to find the right position after a month of trying, we will talk about deactivating the mems device   Monitor Your Weight and Symptoms    Contact us if you:    Gain 2 pounds in one day or 5 pounds in one week  Feel more short of breath  Notice more leg swelling  Feel lightheadeded   Change your lifestyle    Limit Salt or Sodium:  2000 mg  Limit Fluids:  2000 mL or approximately 64 ounces  Eat a Heart Healthy Diet  Low in saturated fats  Stay Active:  Aim to move at least 150 minutes every  week         To Contact us    During Business Hours:  [phone], option # 1      After hours, weekends or holidays:   [phone], Option #4  Ask to speak to the On-Call Cardiologist. Inform them you are a CORE/heart failure patient at the [place].     Use [employer] allows you to communicate directly with your heart team through secure messaging.  [employer] can be accessed any time on your phone, computer, or tablet.  If you need assistance, we'd be happy to help!         Keep your Heart Appointments:    - We will call you next week to check in on your blood pressures, if it is still high, we will increase losartan    - I checked your cholesterol today, pending those results, I will talk to your cardiologist about the Niacin    - Dr. Dominguez in 4-6 Months    - CORE clinic with Yuni in one year     Please consider attending our virtual support group which is held monthly. Please reach out to [employer] at [phone] for more information if you are interested in attending.     2022 dates:   - Monday, November 7th, 1-2pm: topic: What's new in heart failure research and treatment?  - Monday, December 5th, 1-2pm: topic: How to thrive during winter and the holiday season with heart failure    2023 dates:  Monday, January 2nd , 1-2pm  Monday,  February 6th , 1-2pm  Monday, March 6th , 1-2pm  Monday, April 3rd, 1-2pm  Monday, May 1st, 1-2pm  Monday, June 5th, 1-2pm  Monday, July 3rd, 1-2pm  Monday, August 7th, 1-2pm  Monday, September 11th, 1-2pm  Monday, October 2nd, 1-2pm  Monday, November 6th, 1-2pm  Monday, December 4th, 1-2pm

## 2022-10-19 NOTE — NURSING NOTE
Chief Complaint   Patient presents with     Follow Up     Return CORE; 77 year old with chronic diastolic heart failure presents for follow up with labs prior        Vitals were taken and medications were reconciled.    Joellen Mcintyre  8:22 AM

## 2022-10-20 ENCOUNTER — PATIENT OUTREACH (OUTPATIENT)
Dept: CARE COORDINATION | Facility: CLINIC | Age: 77
End: 2022-10-20

## 2022-10-20 ENCOUNTER — PATIENT OUTREACH (OUTPATIENT)
Dept: CARDIOLOGY | Facility: CLINIC | Age: 77
End: 2022-10-20

## 2022-10-20 NOTE — TELEPHONE ENCOUNTER
Called Mariel to review labs.  They updated me that Estevez had called and helped trouble shoot taking mems readings yesterday and that they were able to comfortably do it while sitting.      There are two readings done yesterday afternoon, however they are significantly lower than prior readings.  Will review with team to discuss if parameters would need to be adjusted based on new positioning.

## 2022-10-20 NOTE — PROGRESS NOTES
Clinic Care Coordination Contact  [facility]/Voicemail       Clinical Data: Care Coordinator Outreach  Outreach attempted x 1.  Left message on patient's roommate's voicemail with call back information and requested return call.  Plan: Care Coordinator will try to reach patient again in 1-2 business days.    Things to discuss when patient reached: Home care/PCA services;   Hospital bed; Tailbone pain; How did pallative meeting go? COVID 4th booster? How's Mariel's leg doing? DM still under control? Have you looked into overnight PCA services?    Kayla Salazar, RN, BSN, CPHN, CM  [facility] Ambulatory Care Management  [facility]  Phone: [phone]  Email: [email or web address]

## 2022-10-21 ENCOUNTER — PATIENT OUTREACH (OUTPATIENT)
Dept: CARE COORDINATION | Facility: CLINIC | Age: 77
End: 2022-10-21

## 2022-10-21 ENCOUNTER — TELEPHONE (OUTPATIENT)
Dept: FAMILY MEDICINE | Facility: CLINIC | Age: 77
End: 2022-10-21

## 2022-10-21 NOTE — TELEPHONE ENCOUNTER
Calling for continuation orders for OT  two times a week for 2 weeks for ADLs, fall prevention, home safety and care giver instruction.  Authorization for continuation of OT given.  Ting Andrade RN  [facility]

## 2022-10-21 NOTE — PROGRESS NOTES
"Clinic Care Coordination Contact    Follow Up Progress Note      Assessment: RN CC contacted patient for monthly outreach. Spoke with Vero. RN CC inquired how she was doing. She stated she'd had better days. She did not elaborate. Mariel had just showered with PCA. She was getting ready to take a nap. One goal patient has is to replace her existing bed. They are going to look at a bed they are hoping to buy. Patient is continuing with OT & PT with good progress.   Mariel is still having quite a bit of pain. Her primary care provider adjusted her medications in June. Since she is a high fall risk, she is restricted from taking some of the more effective pain medications. RN CC offered to send a message to her primary care provider with pain control update. Vero was agreeable to this. Mariel recently had an  Appointment with Neurology for headache pain. They recommended Botox injections. Mariel is not really \"keen\" on getting injections like that. Verified Vero and Mariel have RN CC's contact information for any future needs.     Care Gaps:    Health Maintenance Due   Topic Date Due     MEDICARE ANNUAL WELLNESS VISIT  08/10/2017     HF ACTION PLAN  11/30/2021     EYE EXAM  06/10/2022     Care Gaps Last addressed on 10/21/22.    Care Plans  Care Plan: 1. Community Resources     Problem: Sufficient In-home support     Long-Range Goal: Establish adequate home support     Start Date: 8/18/2022 Expected End Date: 12/30/2022    Note:     Goal Statement: I will get set up with home care for RN services, physical and occupational therapy in the next 1 month.   Date goal set: 9/20/22  Measure of Success: Enrollment in home care  Barriers: Home Care will end then private pay PCA will be needed.   Strengths:Strong support system; engaged in care coordination.  Date to Achieve By:3/9/23  Patient expressed understanding of goal:yes    Action steps to achieve this goal  1. I will accept home care services.   2. I will accept the help of " Care Coordination to find a home care agency.     *ANNUAL ASSESSMENT DUE: 08/2023                      Care Plan: Research into hospital bed     Problem: Research into hospital bed     Goal: Complete research into obtaining a hospital bed.     Start Date: 9/20/2022 Expected End Date: 3/20/2023    This Visit's Progress: 50%    Note:     Goal Statement: I will research what type of hospital bed can be obtained through private pay.    Date goal set: 9/20/22  Measure of Success: Obtaining a hospital bed.  Barriers:Purchasing new bed & removing old bed  Strengths:Strong support system; agreeable to care coordination  Date to Achieve By:3/20/23  Patient expressed understanding of goal:yes    Action steps to achieve this goal  1. I will research the styles of hospital beds available.   2. I will accept the help of Care Coordination to find additional resources/information on hospital beds.   3. I will ask Pallative care at our meeting 9/22/22 for information on hospital beds available for purchase or provided through Pallative services.                        Intervention/Education provided during outreach: Discussed patients plan of care. CC RN asked open ended questions, provided support, resources, and encouragement as needed. Patient will reach out to care team sooner than planned with new questions or concerns.     Outreach Frequency: monthly    Plan: RN CC will send message to primary care provider to update on pain control status.     Care Coordinator will follow up in 1 month.     Kayla Salazar RN, BSN, CPHN, CM  [facility] Ambulatory Care Management  [facility] OB  Phone: [phone]  Email: [email or web address]

## 2022-10-26 ENCOUNTER — CARE COORDINATION (OUTPATIENT)
Dept: CARDIOLOGY | Facility: CLINIC | Age: 77
End: 2022-10-26

## 2022-10-26 NOTE — PROGRESS NOTES
Called Mariel to check in. Seen in clinic last week and plan was for 1 week check on BP, if still high would increase losartan. Odalis reports it's been up and down. Doesn't remember from earlier in the week but reports:  150-160 yesterday systolic.   114/55 and pulse 75 today.     OT is coming this afternoon and they will take it again. Asked if it's high that they send us a [employer] message.   She reports they have help coming in 4 times a week and that she'll try to get better about paying attention to the blood pressure numbers when they take it.     Odalis has had a busy week but they will try to get on sending cardiomems numbers consistently. They do like the new method of being able to send them sitting up.     Rosina Mays RN

## 2022-10-27 ENCOUNTER — CARE COORDINATION (OUTPATIENT)
Dept: CARDIOLOGY | Facility: CLINIC | Age: 77
End: 2022-10-27

## 2022-10-27 NOTE — PROGRESS NOTES
[facility] CORE Clinic - CardioMEMS Reading Review    October 27, 2022   CardioMEMS reviewed.  Current diuretic: Bumex 4 mg daily  Recent plan of care changes:     Current Threshold parameters:       Today's Waveform:       Readings:           RHC Date Wedge Pressure MEMS PAD during cath  (average of the 3 values)     7/1/2019 w/MEMS implant     20 mmHg   18 mmHg           Debbi Alcantara RN

## 2022-10-30 ENCOUNTER — ALLIED HEALTH/NURSE VISIT (OUTPATIENT)
Dept: CARDIOLOGY | Facility: CLINIC | Age: 77
End: 2022-10-30
Attending: NURSE PRACTITIONER
Payer: MEDICARE

## 2022-10-30 DIAGNOSIS — I50.32 CHRONIC DIASTOLIC HEART FAILURE (H): Primary | ICD-10-CM

## 2022-11-02 ENCOUNTER — CARE COORDINATION (OUTPATIENT)
Dept: CARDIOLOGY | Facility: CLINIC | Age: 77
End: 2022-11-02

## 2022-11-02 ENCOUNTER — TELEPHONE (OUTPATIENT)
Dept: FAMILY MEDICINE | Facility: CLINIC | Age: 77
End: 2022-11-02

## 2022-11-02 NOTE — TELEPHONE ENCOUNTER
Order/Referral Request    Who is requesting: Home Healthe    Orders being requested: PT 2 x wk for 2 wks, Home Health aid 1 x wk for 2 wks.    Reason service is needed/diagnosis: PT - strengthening gait for stairs and bed mobility. Aid for personal care and shower assist    When are orders needed by: By friday    Has this been discussed with Provider: Yes    Does patient have a preference on a Group/Provider/Facility? Home Health    Does patient have an appointment scheduled?: Yes: Aid for this Friday    Where to send orders: Verbal to Ora [phone]    Could we send this information to you in [place] or would you prefer to receive a phone call?:   No preference   Okay to leave a detailed message?: Yes at Other phone number:  [phone] ca

## 2022-11-02 NOTE — TELEPHONE ENCOUNTER
Writer called Ora with [facility] and gave verbal authorization for requested home care orders.    SONIA RobertsN, RN-[facility]

## 2022-11-02 NOTE — PROGRESS NOTES
[facility] CORE Clinic - CardioMEMS Reading Review    November 2, 2022   CardioMEMS reviewed.  Current diuretic: Bumex 4 mg daily  Recent plan of care changes: Started taking Mems readings sitting up. Have been about 10 points lower.     Current Threshold parameters:       Today's Waveform:       Readings:           RHC Date Wedge Pressure MEMS PAD during cath  (average of the 3 values)     7/1/2019 w/MEMS implant     20 mmHg   18 mmHg           Rosina Mays RN

## 2022-11-03 ENCOUNTER — TELEPHONE (OUTPATIENT)
Dept: FAMILY MEDICINE | Facility: CLINIC | Age: 77
End: 2022-11-03

## 2022-11-03 DIAGNOSIS — R29.6 RECURRENT FALLS: Primary | ICD-10-CM

## 2022-11-03 NOTE — PROGRESS NOTES
Patient did not meet minimum requirements for billing for period of 10/1/22 to 10/30/22. Will not be billed for this period.   Rosina Mays RN

## 2022-11-03 NOTE — TELEPHONE ENCOUNTER
Reason for Call: Request for an order or referral:    Order or referral being requested: OT Continued Orders    Date needed: at your convenience    Has the patient been seen by the PCP for this problem? Not Applicable    Additional comments: Continued OT 2x a wk for 2wks     Phone number Patient can be reached at:  Other phone number:  [phone]    Best Time:  Anytime    Can we leave a detailed message on this number?  YES     Call taken on 11/3/2022 at 3:31 PM by Maciej Hdz

## 2022-11-09 ENCOUNTER — CARE COORDINATION (OUTPATIENT)
Dept: CARDIOLOGY | Facility: CLINIC | Age: 77
End: 2022-11-09

## 2022-11-09 NOTE — PROGRESS NOTES
[facility] CORE Clinic - CardioMEMS Reading Review    November 9, 2022   CardioMEMS reviewed.  Current diuretic: Bumex 4 mg daily  Recent plan of care changes:     Current Threshold parameters:       Today's Waveform:       Readings:           RHC Date Wedge Pressure MEMS PAD during cath  (average of the 3 values)     7/1/2019 w/MEMS implant     20 mmHg   18 mmHg           Rosina Mays RN

## 2022-11-17 ENCOUNTER — VIRTUAL VISIT (OUTPATIENT)
Dept: NEUROLOGY | Facility: CLINIC | Age: 77
End: 2022-11-17
Payer: MEDICARE

## 2022-11-17 DIAGNOSIS — E11.42 DIABETIC POLYNEUROPATHY ASSOCIATED WITH TYPE 2 DIABETES MELLITUS (H): Primary | ICD-10-CM

## 2022-11-17 DIAGNOSIS — R41.89 COGNITIVE CHANGE: ICD-10-CM

## 2022-11-17 PROCEDURE — 99215 OFFICE O/P EST HI 40 MIN: CPT | Mod: 95 | Performed by: PSYCHIATRY & NEUROLOGY

## 2022-11-17 PROCEDURE — 99417 PROLNG OP E/M EACH 15 MIN: CPT | Performed by: PSYCHIATRY & NEUROLOGY
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
## 2022-11-17 NOTE — PROGRESS NOTES
Mariel is a 77 year old who is being evaluated via a billable video visit.      How would you like to obtain your AVS? [employer]  If the video visit is dropped, the invitation should be resent by:[phone]        Video-Visit Details    Video Start Time: 1100    Type of service:  Video Visit    Video End Time:12:14 PM    Originating Location (pt. Location): Home        Distant Location (provider location):  On-site    Platform used for Video Visit: [employer]
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
## 2022-11-18 ENCOUNTER — PATIENT OUTREACH (OUTPATIENT)
Dept: CARE COORDINATION | Facility: CLINIC | Age: 77
End: 2022-11-18

## 2022-11-18 ENCOUNTER — MEDICAL CORRESPONDENCE (OUTPATIENT)
Dept: HEALTH INFORMATION MANAGEMENT | Facility: CLINIC | Age: 77
End: 2022-11-18

## 2022-11-18 NOTE — PROGRESS NOTES
Clinic Care Coordination Contact  [facility]/Voicemail    Referral Source: Care Team  Clinical Data: Care Coordinator Outreach  Outreach attempted x 1.  Left message on patient's voicemail with call back information and requested return call.  Plan:  Care Coordinator will try to reach patient again in 1-2 business days.    Kayla Salazar RN, BSN, CPHN, CM  [facility] Ambulatory Care Management  [facility]  Phone: [phone]  Email: [email or web address]

## 2022-11-21 ENCOUNTER — PATIENT OUTREACH (OUTPATIENT)
Dept: CARE COORDINATION | Facility: CLINIC | Age: 77
End: 2022-11-21

## 2022-11-21 ENCOUNTER — CARE COORDINATION (OUTPATIENT)
Dept: CARDIOLOGY | Facility: CLINIC | Age: 77
End: 2022-11-21

## 2022-11-21 NOTE — PROGRESS NOTES
Clinic Care Coordination Contact    Follow Up Progress Note      Assessment: : RN CC contacted patient for monthly outreach. Spoke to Vero her roommate, which is usual. Vero has a business meeting at 2pm (in 17 minutes). They have not ordered the hospital bed yet. Just need to take that step.     Care Gaps:    Health Maintenance Due   Topic Date Due     MEDICARE ANNUAL WELLNESS VISIT  08/10/2017     HF ACTION PLAN  11/30/2021     EYE EXAM  06/10/2022     MICROALBUMIN  12/07/2022     Discussed care gaps and MAWV with Vero.     Care Plans  Care Plan: 1. Community Resources     Problem: Sufficient In-home support     Long-Range Goal: Establish adequate home support     Start Date: 8/18/2022 Expected End Date: 12/30/2022    Note:     Goal Statement: I will get set up with home care for RN services, physical and occupational therapy in the next 1 month.   Date goal set: 9/20/22  Measure of Success: Enrollment in home care  Barriers: Home Care will end then private pay PCA will be needed.   Strengths:Strong support system; engaged in care coordination.  Date to Achieve By:3/9/23  Patient expressed understanding of goal:yes    Action steps to achieve this goal  1. I will accept home care services.   2. I will accept the help of Care Coordination to find a home care agency.     *ANNUAL ASSESSMENT DUE: 08/2023                      Care Plan: Research into hospital bed     Problem: Research into hospital bed     Goal: Complete research into obtaining a hospital bed.     Start Date: 9/20/2022 Expected End Date: 3/20/2023    This Visit's Progress: 80% Recent Progress: 50%    Note:     Goal Statement: I will research what type of hospital bed can be obtained through private pay.    Date goal set: 9/20/22  Measure of Success: Obtaining a hospital bed.  Barriers:Purchasing new bed & removing old bed  Strengths:Strong support system; agreeable to care coordination  Date to Achieve By:3/20/23  Patient expressed understanding of  goal:yes    Action steps to achieve this goal  1. I will research the styles of hospital beds available.   2. I will accept the help of Care Coordination to find additional resources/information on hospital beds.   3. I will ask Pallative care at our meeting 9/22/22 for information on hospital beds available for purchase or provided through Pallative services.                        Intervention/Education provided during outreach: Intervention/Education provided during outreach: Discussed patients plan of care. CC RN asked open ended questions, provided support, resources, and encouragement as needed. Patient will reach out to care team sooner than planned with new questions or concerns.     Outreach Frequency: monthly    Plan: RN CC will send updated complex care plan to patient via [employer].     Care Coordinator will follow up in 1 month.     Kayla Salazar RN, BSN, CPHN, CM  [facility] Ambulatory Care Management  [facility] Family [facility] OB  Phone: [phone]  Email: [email or web address]

## 2022-11-21 NOTE — LETTER
[facility]  Patient Centered Plan of Care  About Me:        Patient Name:  Mariel Ribeiro    YOB: 1945  Age:         77 year old   Destiney MRN:    [id number] Telephone Information:  Home Phone [phone]   Mobile [phone]       Address:  [address] [place] [address] Email address:  [email or web address]      Emergency Contact(s)    Name Relationship Lgl Grd Work Phone Home Phone Mobile Phone   1. KOSTAS PIERSON Roommate No [phone]   2. VITA ODOM Sister No  [phone]   3. ORTIZ,SAKSHI Friend No  [phone]           Primary language:  English     needed? No   [employer]:  [phone] op. 1  Other communication barriers:Glasses    Preferred Method of Communication:  Fabian  Current living arrangement: Other; I live in a private home (i live in a house with a roommate)    Mobility Status/ Medical Equipment: Independent w/Device    Health Maintenance  Health Maintenance Reviewed: Due/Overdue (Discussed with Vero (roommate))    My Access Plan  Medical Emergency 911   Primary Clinic Line [facility] - [phone]   24 Hour Appointment Line [phone] or  [phone]) (toll-free)   24 Hour Nurse Line [phone] (toll-free)   Preferred Urgent Care [facility], [phone]     Preferred Hospital [facility]  [phone]     Preferred Pharmacy [id number] - [place], [place] - [address] AT [facility] [address]     Behavioral Health Crisis Line The National Suicide Prevention Lifeline at 1-856.187.9053 or Text/Call 888     My Care Team Members  Patient Care Team       Relationship Specialty Notifications Start End    Malika Wolf, APRN CNP PCP - General Nurse Practitioner Primary Care  2/7/22     Phone: [phone] Fax:  [phone]         [address] [place] [address]    Zeeshan Hutson MD MD Orthopedics  9/8/14      XXX RETIRED  [place] [address]    Jefry Hanson DPM  Podiatry  9/8/14     Phone: [phone] Fax: [phone]         [address] [facility] [address]    Tom Cruz MD MD Neurology  5/12/15     Phone: [phone] Fax: [phone]         [address] [facility] [facility] [address]    Rosina Kohler MD MD Family Medicine - Sports Medicine  11/21/16     Phone: [phone] Fax: [phone]         0 [facility] [address]    Jeffrey Roberson MD MD General Surgery  5/22/17     Phone: [phone] Fax: [phone]         [address] [place] [address] [address] [facility] [address]    Stephanie Wolf MD MD Cardiology  7/2/18     Phone: [phone] Fax: [phone]         [address] [place] [address] [address] [facility] [address]    Cathi Vaughn APRN CNP Nurse Practitioner Cardiology Admissions 8/24/18     [facility]    Phone: [phone] Pager: [phone] Fax: [phone]        [facility] [address]    Rosina Mays, RN Nurse Coordinator Cardiology Admissions 8/24/18     [facility]    Phone: [phone] Fax: [phone]        Shayna Newell, RN Specialty Care Coordinator Cardiology Admissions 3/13/19     Phone: [phone]         Magdalena Hall PA-C Physician Assistant Cardiology Admissions 12/10/19     [facility]    Phone: [phone] Fax: [phone]         [address] [facility] [address]    Lesly Cobos, RN Specialty Care Coordinator Breast Oncology Admissions 8/23/20     Phone: [phone] Pager: [phone]        Opal Weber MD MD Breast Oncology Admissions 8/23/20     Phone: [phone] Fax: [phone]         [facility] [address]    Opal Weber MD Assigned Cancer Care Provider   10/23/20     Phone: [phone]  Fax: [phone]         [facility] [facility] [address]    Dahlia Wolfe PA-C Physician Assistant Urology  12/22/20     Phone: [phone] Fax: [phone] [address] [place] [address]    Radha Llanos, RN Diabetes Educator Diabetes Education  1/8/21     Phone: [phone] Fax: [phone]         [address] [address]    Muriel Will PA-C Physician Assistant Endocrinology, Diabetes, and Metabolism  2/10/21     Phone: [phone] Fax: [phone]         [address] [place] [address]    Muriel Will PA-C Assigned Endocrinology Provider   3/17/21     Phone: [phone] Fax: [phone]         [address] [place] [address]    Dahlia Wolfe PA-C Assigned OBGYN Provider   11/21/21     Phone: [phone] Fax: [phone] [address] [facility] [address]    Malika Wolf APRN CNP Assigned PCP   3/27/22     Phone: [phone] Fax: [phone]         [address] [place] [address]    Shreyas Cole, PhD Assigned Behavioral Health Provider   4/17/22     Phone: [phone] Fax: [phone]         [address] [place] [address]    Paula Hdz APRN CNP Nurse Practitioner Neurology  6/7/22     Phone: [phone] Fax: [phone]          [place] [address] [address]    Jefry Ramos MD Assigned Surgical Provider   6/25/22     Phone: [phone] Fax: [phone]         [address] [place] [address]    Bharat Fitzgerald DPM Assigned Musculoskeletal Provider   7/9/22     Phone: [phone] Fax: [phone]         [address] [facility] [address]    Allison Prather MD Assigned Pulmonology Provider   7/30/22     Phone: [phone] Fax: [phone]         [address] [address]    Kayla Salazar, JODY Lead Care Coordinator  Admissions 8/29/22     Gwendolyn Mobley PAOfeC Assigned Nephrology  Provider   9/24/22     Phone: [phone] Fax: [phone]         [address] [facility] [address]    Ronny Raines MD Assigned Palliative Care Provider   9/28/22     Phone: [phone] Fax: [phone]         [address] [facility], [address] Palliative Care [facility] [address]    Paula Hdz APRN CNP Assigned Neuroscience Provider   10/22/22     Phone: [phone] Fax: [phone]         [address] [facility] [id number] [facility] [address]    Magdalena Hall PA-C Assigned Heart and Vascular Provider   10/29/22     Phone: [phone] Fax: [phone]         [address] [facility] [address]            My Care Plans  Self Management and Treatment Plan  Care Plan  Care Plan: 1. Community Resources     Problem: Sufficient In-home support     Long-Range Goal: Establish adequate home support  Completed 11/21/2022    Start Date: 8/18/2022 Expected End Date: 12/30/2022    Note:     Goal Statement: I will get set up with home care for RN services, physical and occupational therapy in the next 1 month.   Date goal set: 9/20/22  Measure of Success: Enrollment in home care  Barriers: Home Care will end then private pay PCA will be needed.   Strengths:Strong support system; engaged in care coordination.  Date to Achieve By:3/9/23  Patient expressed understanding of goal:yes    Patient has been discharged from home care. She plans to continue exercises ongoing.     Action steps to achieve this goal  1. I will accept home care services.   2. I will accept the help of Care Coordination to find a home care agency.                         Care Plan: Research into hospital bed     Problem: Research into hospital bed     Goal: Complete research into obtaining a hospital bed.     Start Date: 9/20/2022 Expected End Date: 3/20/2023    This Visit's Progress: 80% Recent Progress: 50%    Note:     Goal Statement: I will research what type of hospital bed can be obtained through private pay.    Date  goal set: 9/20/22  Measure of Success: Obtaining a hospital bed.  Barriers:Purchasing new bed & removing old bed  Strengths:Strong support system; agreeable to care coordination  Date to Achieve By:3/20/23  Patient expressed understanding of goal:yes    Action steps to achieve this goal  1. I will research the styles of hospital beds available.   2. I will accept the help of Care Coordination to find additional resources/information on hospital beds.   3. I will ask Pallative care at our meeting 9/22/22 for information on hospital beds available for purchase or provided through Pallative services.                           Action Plans on File:   Depression    Advance Care Plans/Directives Type:   Advanced Directive - On File      My Medical and Care Information  Problem List   Patient Active Problem List   Diagnosis     Hypothyroidism     Diverticulitis of colon     Coronary atherosclerosis     Myalgia and myositis     Migraine     Chronic airway obstruction/ COPD     Mononeuritis     Obstructive sleep apnea     Vitamin D deficiency     Hypertension goal BP (blood pressure) < 130/80     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)     Hyperlipidemia with target LDL less than 70     Chronic diastolic heart failure (H)     Osteoarthritis     Morbid obesity (H)     Arthritis of knee     Transient cerebral ischemia     History of thyroid cancer     Cervicalgia     Fatty liver disease, nonalcoholic     Hepatomegaly     Angina at rest (H)     S/P CABG x 3     Type 2 diabetes mellitus with stage 3 chronic kidney disease, with long-term current use of insulin (H)     Lymphedema     Lower back pain     Bilateral carotid artery disease (H)     Spinal stenosis of lumbar region, unspecified whether neurogenic claudication present     Status post coronary angiogram     Hemoptysis     Intertrigo     Malignant neoplasm of lower-inner quadrant of left breast in female, estrogen receptor positive (H)     Pain in the coccyx     Facet  "arthropathy     CKD (chronic kidney disease) stage 4, GFR 15-29 ml/min (H)     Facet arthropathy, lumbar     Temporal pain     Elevated C-reactive protein     Hypertensive chronic kidney disease with stage 5 chronic kidney disease or end stage renal disease (H)     Generalized weakness     Recurrent falls      Current Medications and Allergies:    Current Outpatient Medications   Medication     acetaminophen (TYLENOL) 325 MG tablet     albuterol (PROAIR HFA/PROVENTIL HFA/VENTOLIN HFA) 108 (90 Base) MCG/ACT inhaler     anastrozole (ARIMIDEX) 1 MG tablet     aspirin (ASA) 81 MG EC tablet     atorvastatin (LIPITOR) 40 MG tablet     blood glucose (ONETOUCH ULTRA) test strip     bumetanide (BUMEX) 1 MG tablet     Continuous Blood Gluc  (FREESTYLE MARTY 14 DAY READER) JEZ     Continuous Blood Gluc Sensor ([employer] CompanySTYLE MARTY 14 DAY SENSOR) MISC     Continuous Blood Gluc Sensor (FREESTYLE MARTY 14 DAY SENSOR) MISC     DULoxetine (CYMBALTA) 30 MG capsule     EPINEPHrine (ANY BX GENERIC EQUIV) 0.3 MG/0.3ML injection 2-pack     escitalopram (LEXAPRO) 20 MG tablet     ferrous gluconate (FERGON) 324 (38 Fe) MG tablet     folic acid (FOLVITE) 1 MG tablet     guaiFENesin (MUCINEX) 600 MG 12 hr tablet     insulin glargine (LANTUS SOLOSTAR) 100 UNIT/ML pen     levothyroxine (SYNTHROID) 150 MCG tablet     [employer] TechnologiesCAN FINEPOINT LANCETS MISC     losartan (COZAAR) 25 MG tablet     metoprolol succinate ER (TOPROL-XL) 25 MG 24 hr tablet     miconazole (MICATIN/MICRO GUARD) 2 % external powder     niacin ER (NIASPAN) 500 MG CR tablet     nitroGLYcerin (NITROSTAT) 0.4 MG sublingual tablet     nystatin (MYCOSTATIN) 181605 UNIT/GM external ointment     nystatin POWD     ONE TOUCH ULTRA (DEVICES) MISC     potassium chloride ER (KLOR-CON M) 10 MEQ CR tablet     pregabalin (LYRICA) 100 MG capsule     repaglinide (PRANDIN) 1 MG tablet     Skin Protectants, Misc. ([facility] AG TEXTILE 10\"X144\") SHEE     tolterodine ER (DETROL LA) 2 MG 24 hr " capsule     No current facility-administered medications for this visit.     Facility-Administered Medications Ordered in Other Visits   Medication     sodium chloride (PF) 0.9% PF flush 10 mL        Allergies   Allergen Reactions     Contrast Dye Anaphylaxis     Fish Allergy Anaphylaxis     Iodine Anaphylaxis     Oxycodone Other (See Comments)     Severe suicidal tendencies on this medication     Tree Nuts [Nuts] Anaphylaxis     Tree nuts only (peanuts ok)     Bactrim      Increased uric acid     Betadine [Povidone Iodine] Hives, Swelling and Difficulty breathing     Betadine     Combivent      Rash     Dulaglutide Other (See Comments)     Hx . Of thyroid cancer.     Fish      Lisinopril Other (See Comments)     Scr/grf severely reduced.      Penicillins Rash     Allopurinol Rash     Latex Rash     Wool Fiber Rash       Care Coordination Start Date: 4/6/2020   Frequency of Care Coordination: monthly     Form Last Updated: 11/21/2022

## 2022-11-21 NOTE — PROGRESS NOTES
[facility] - CardioMEMS Reading Review    November 21, 2022   CardioMEMS reviewed.  Current diuretic: Bumex 4 mg daily  Recent plan of care changes: No Changes. Called pt and left VM to discuss elevated PAD of 19 today.     Current Threshold parameters:       Today's Waveform:       Readings:           RHC Date Wedge Pressure MEMS PAD during cath  (average of the 3 values)     7/1/2019 w/MEMS implant     20 mmHg   18 mmHg           Stacey Neal RN

## 2022-11-29 ENCOUNTER — CARE COORDINATION (OUTPATIENT)
Dept: CARDIOLOGY | Facility: CLINIC | Age: 77
End: 2022-11-29

## 2022-11-29 ENCOUNTER — ALLIED HEALTH/NURSE VISIT (OUTPATIENT)
Dept: CARDIOLOGY | Facility: CLINIC | Age: 77
End: 2022-11-29
Attending: NURSE PRACTITIONER
Payer: MEDICARE

## 2022-11-29 DIAGNOSIS — I50.30 (HFPEF) HEART FAILURE WITH PRESERVED EJECTION FRACTION (H): Primary | ICD-10-CM

## 2022-11-29 NOTE — PROGRESS NOTES
[facility] - CardioMEMS Reading Review    November 29, 2022   CardioMEMS reviewed.  Current diuretic: Bumex 4 mg daily  Recent plan of care changes: none. Need updated PAD as now doing readings sitting up, getting a lower reading. Has been stable.     Current Threshold parameters:       Today's Waveform:       Readings:           RHC Date Wedge Pressure MEMS PAD during cath  (average of the 3 values)     7/1/2019 w/MEMS implant     20 mmHg   18 mmHg           Rosina Mays RN

## 2022-12-01 NOTE — PROGRESS NOTES
Patient has not met criteria for billing, not used [facility] minimum required times. This is a non billable encounter.

## 2022-12-02 ENCOUNTER — VIRTUAL VISIT (OUTPATIENT)
Dept: PHARMACY | Facility: CLINIC | Age: 77
End: 2022-12-02
Attending: PSYCHIATRY & NEUROLOGY
Payer: COMMERCIAL

## 2022-12-02 DIAGNOSIS — E03.9 HYPOTHYROIDISM, UNSPECIFIED TYPE: ICD-10-CM

## 2022-12-02 DIAGNOSIS — R05.3 CHRONIC COUGH: ICD-10-CM

## 2022-12-02 DIAGNOSIS — Z78.9 TAKES DIETARY SUPPLEMENTS: ICD-10-CM

## 2022-12-02 DIAGNOSIS — N18.32 TYPE 2 DIABETES MELLITUS WITH STAGE 3B CHRONIC KIDNEY DISEASE, WITH LONG-TERM CURRENT USE OF INSULIN (H): Chronic | ICD-10-CM

## 2022-12-02 DIAGNOSIS — D50.9 IRON DEFICIENCY ANEMIA, UNSPECIFIED IRON DEFICIENCY ANEMIA TYPE: ICD-10-CM

## 2022-12-02 DIAGNOSIS — R07.9 CHEST PAIN, UNSPECIFIED TYPE: ICD-10-CM

## 2022-12-02 DIAGNOSIS — I10 BENIGN ESSENTIAL HYPERTENSION: ICD-10-CM

## 2022-12-02 DIAGNOSIS — B49 FUNGAL INFECTION: ICD-10-CM

## 2022-12-02 DIAGNOSIS — G89.29 OTHER CHRONIC PAIN: ICD-10-CM

## 2022-12-02 DIAGNOSIS — R25.1 TREMOR: Primary | ICD-10-CM

## 2022-12-02 DIAGNOSIS — J30.9 ALLERGIC RHINITIS, UNSPECIFIED SEASONALITY, UNSPECIFIED TRIGGER: ICD-10-CM

## 2022-12-02 DIAGNOSIS — F33.0 MILD EPISODE OF RECURRENT MAJOR DEPRESSIVE DISORDER (H): ICD-10-CM

## 2022-12-02 DIAGNOSIS — F41.1 GAD (GENERALIZED ANXIETY DISORDER): ICD-10-CM

## 2022-12-02 DIAGNOSIS — Z17.0 MALIGNANT NEOPLASM OF LOWER-INNER QUADRANT OF LEFT BREAST IN FEMALE, ESTROGEN RECEPTOR POSITIVE (H): ICD-10-CM

## 2022-12-02 DIAGNOSIS — C50.312 MALIGNANT NEOPLASM OF LOWER-INNER QUADRANT OF LEFT BREAST IN FEMALE, ESTROGEN RECEPTOR POSITIVE (H): ICD-10-CM

## 2022-12-02 DIAGNOSIS — Z79.4 TYPE 2 DIABETES MELLITUS WITH STAGE 3B CHRONIC KIDNEY DISEASE, WITH LONG-TERM CURRENT USE OF INSULIN (H): Chronic | ICD-10-CM

## 2022-12-02 DIAGNOSIS — E11.22 TYPE 2 DIABETES MELLITUS WITH STAGE 3B CHRONIC KIDNEY DISEASE, WITH LONG-TERM CURRENT USE OF INSULIN (H): Chronic | ICD-10-CM

## 2022-12-02 DIAGNOSIS — E78.5 HYPERLIPIDEMIA LDL GOAL <100: ICD-10-CM

## 2022-12-02 DIAGNOSIS — I50.31 ACUTE HEART FAILURE WITH PRESERVED EJECTION FRACTION (HFPEF) (H): ICD-10-CM

## 2022-12-02 DIAGNOSIS — R35.0 URINARY FREQUENCY: ICD-10-CM

## 2022-12-02 PROCEDURE — 99605 MTMS BY PHARM NP 15 MIN: CPT | Performed by: PHARMACIST

## 2022-12-02 PROCEDURE — 99607 MTMS BY PHARM ADDL 15 MIN: CPT | Performed by: PHARMACIST

## 2022-12-02 RX ORDER — ACETAMINOPHEN 500 MG
1000 TABLET ORAL 2 TIMES DAILY
Status: ON HOLD | COMMUNITY
End: 2024-01-01

## 2022-12-02 NOTE — PROGRESS NOTES
Medication Therapy Management (MTM) Encounter    ASSESSMENT:                            Medication Adherence/Access: No issues identified    Tremor: Duloxetine can cause tremor in some people and she may have elevated serum concentrations due to kidney failure, though current dose in within recommended dosing and tremor preceded starting this medication. Could also be related to hypo/hyperglycemia. They don't often check glucose, but has been normal during shaking episodes when they have.    Type 2 Diabetes: A1c not at goal of <7. Discussed ways to improve adherence with dinner repaglinide, including setting an alarm or getting a separate pill box to keep near items for dinner routine. Due for annual eye exam, which they will be scheduling soon.    Hypothyroidism: Stable, though could consider switching to generic levothyroxine and adjusting dose if needed. Discussed requesting same  from the pharmacy each time, if they do switch. Unsure if there were previous issues with generic or other indication to remain on brand name. Pt would prefer the cost savings with generic.    Hyperlipidemia: Stable. Patient is on high intensity statin which is indicated based on 2019 ACC/AHA guidelines for lipid management.    Consider discontinuing niacin, which will be discussed with PCP.     Pain: Could be better managed though limited by NSAIDs being contraindicated with heart failure, CKD, and cognitive decline and risk for falls with CNS depressants. They have tried multiple topicals, without much benefit. Unclear if she is experiencing nerve pain or other trauma (fall) related pain, but could consider compounded gabapentin gel. They will discuss with PCP.  For now, recommended that she try taking the third acetaminophen dose as scheduled, rather than prn, with which she agreed.    Depression/Anxiety: Unclear if duloxetine is providing much benefit for mood, though pain is a major compounding factor. As her kidney  function is on the cusp of being appropriate for duloxetine treatment, may consider switching to a different agent that may provide more benefit in the future.    Allergies: Pt would benefit from getting new Epipen. Will discuss at upcoming visit.    HFpEF/Hypertension Stable. Patient is generally meeting BP goal of < 140/90mmHg other than recent outlier.  Pt landy on appropriate ACE/ARB and  and Current weight is stable and diuretic dose seems to be appropriate. Pt would benefit from monitoring daily weight as possible, though has regular follow up with [facility].     Chest pain: Stable. Continue current medication.  Fungal: Stable. Continue current medication.    Coughing/SOB: Diagnosis and treatment unclear, as patient is not really taking any medications and symptoms sound stable. It doesn't seem as though guaifenesin is necessary to take scheduled every day, though advised that she discuss with PCP. Could consider prn use.    Breast Cancer: Stable. Continue current medication.    Urinary Frequency: Stable. Continue current medication.    Iron deficiency anemia: Stable. Continue current medication.    Supplements: Continue current medication for now, though consider discontinuation after discussion with PCP.     Kidney function: Patient's recent CrCl is about 37ml/min. Many medications have recommended dose maximums when CrCl<30ml/min, so ongoing monitoring is warranted. Ok for now.  Tolterodine ER: max 2mg daily  Duloxetine: avoid use if possible    PLAN:                            1. Start taking acetaminophen 1000mg three times daily on a schedule, instead of just twice daily.  2. Please schedule annual eye exam.  3. Consider discussing possibility of gabapentin topical gel for coccyx pain.    We highlighted the following questions/concerns to bring up at your next primary care visit.  -Ok to stop niacin?  -Are folic acid and Mucinex still needed?  -Need new prescription for Epipen  -Would it be ok to switch  "Synthroid to generic levothyroxine and adjust doses as needed?  - Can you get a prescription for a new blood pressure cuff?    Follow-up: after primary care follow up or as needed    SUBJECTIVE/OBJECTIVE:                          Mariel Ribeiro is a 77 year old female called for an initial visit. She was referred to me from Dr. Denney.      Reason for visit: med review; any meds contributing to shaking/tremor?    Allergies/ADRs: Reviewed in chart  Past Medical History: Reviewed in chart  The patient has a pertinent medical history of BELEN, morbid obesity, HLD, Hypothyroidism, Migraine, BELEN, HTN, MDD, Chronic diastolic heart failure, DMII (A1c 7.2), CKD (GFR 37), hepatomegaly from fatty liver disease, lumbar spinal stenosis, 2016 CABG x3, and left breast DCIS declining surgical excision and using anastrozole for 2 years.    Tobacco: She reports that she quit smoking about 33 years ago. Her smoking use included cigarettes. She has never used smokeless tobacco.  Alcohol: not currently using    Medication Adherence/Access: no issues reported    The patient has a pertinent medical history of BELEN, morbid obesity, HLD, Hypothyroidism, Migraine, BELEN, HTN, MDD, Chronic diastolic heart failure, DMII (A1c 7.2), CKD (GFR 37), hepatomegaly from fatty liver disease, lumbar spinal stenosis, 2016 CABG x3, and left breast DCIS declining surgical excision and using anastrozole for 2 years.    Tremor: No current medications. Pt reported having inconsistent and random tremor in her hands or sometimes through the body. It has been going on for at least a year and precedes starting duloxetine. They wonder if any meds are contributing.     Pt saw Dr. Denney on 11/17/22 for evaluation of cognitive decline and tremor/shaking. Per his note, \"there was not a clear picture for neurodegenerative disease or a movement disorder\" and much her symptoms were thought to be related to general management of other medical conditions. " "Previously, it was found that diabetes was poorly managed and neuropathy contributing to imbalance.     Type 2 Diabetes:  Currently is prescribed repaglinide 1mg TID, but usually takes once per day, as she only eats twice per day and often forgets the evening/dinner dose, Lantus 29 units daily around noon. Patient is not experiencing side effects.  Blood sugar monitoring: Continuous Glucose Monitor. \"usually pretty consistent\" but don't check it too often.  Symptoms of low blood sugar? none  Symptoms of high blood sugar? none  Eye exam: due  Foot exam: up to date  Aspirin: Taking 81mg daily and denies side effects   Statin: Yes: atorvastatin   ACEi/ARB: Yes: losartan.   Urine Albumin:   Lab Results   Component Value Date    UMALCR 319.61 (H) 01/20/2022      Lab Results   Component Value Date    A1C 7.2 07/12/2022    A1C 6.8 03/09/2022    A1C 7.0 11/23/2021    A1C 6.8 04/29/2021    A1C 6.9 06/24/2020    A1C 6.5 04/30/2019    A1C 5.2 09/10/2018    A1C 9.3 07/01/2018       Hypothyroidism: Patient is taking Synthroid 150 mcg daily. She had been on brand name for many years. Wonders about possibility of switching to generic for cost savings. Patient is having the following symptoms: none.   TSH   Date Value Ref Range Status   07/13/2022 1.98 0.30 - 4.20 uIU/mL Final   03/09/2022 3.56 0.40 - 4.00 mU/L Final   04/29/2021 7.12 (H) 0.40 - 4.00 mU/L Final     Hyperlipidemia: Current therapy includes atorvastatin 40mg daily, niacin ER 500mg BID.  Pt last saw Cardiology in October, who questioned need for continuing niacin, as pt has been taking it for a long time. Pt stated that she has switched PCPs many times through the years and it just keeps getting continued. Patient reports no significant myalgias or other side effects.  The 10-year ASCVD risk score (Devin GALLEGOS, et al., 2019) is: 56%    Values used to calculate the score:      Age: 77 years      Sex: Female      Is Non- : No      Diabetic: Yes     "  Tobacco smoker: No      Systolic Blood Pressure: 162 mmHg      Is BP treated: Yes      HDL Cholesterol: 54 mg/dL      Total Cholesterol: 185 mg/dL  Recent Labs   Lab Test 10/19/22  0751 03/09/22  1304 03/30/16  1343 05/05/15  0751   CHOL 185 172   < > 145   HDL 54 57   < > 45*   * 89   < > 56   TRIG 149 131   < > 221*   CHOLHDLRATIO  --   --   --  3.2    < > = values in this interval not displayed.       Pain: She has significant pain in coccyx, which they think is related to some falls and subsequently has a lot of pain when sitting or laying down. Has had limitations with pain medications due to having high risk for falls.  She is taking pregabalin 100mg BID, acetaminophen 1000mg BID and sometimes an extra dose in the day. Meds are not as helpful as they'd like.  They sometimes use heat, ice, biofreeze, various OTC patches.  Lidocaine shots have not been helpful.    Depression/Anxiety: Pt is taking duloxetine 30mg twice daily.  She was switched from escitalopram to duloxetine in 6/2022 to help with pain, but they do not think it has been helpful for pain. Hard to tell if mood is any different since pain is a big contributor. They feel that pain increases depression and anxiety symptoms. Pt does have occasional issues with constipation and dry mouth, but do not recall any worsening since starting duloxetine.    Allergies: Allergies to fish, tree nuts, bee stings. Does not currently have Epipen, but has been prescribed in the past.    HFpEF/Hypertension: Current medications include metoprolol succinate 12.5mg every morning and 25mg every evening, losartan 25mg daily and bumetanide 4mg once daily, potassium ER 20mEq twice daily.  Patient reports no current medication side effects.    Wears compression stockings.  Has cardiomems device with very regular follow up with [facility], who has stated she is doing well.    ECHO:  Date 3/9/22, EF 45-50%  Patient is not complaining of HF symptoms .    Patient is not  measuring daily weights.   Patient does self-monitor blood pressure. Home BP monitoring in range of 120-140s's systolic over 50-90's diastolic.  She stated that BP cuff is very old and wonders if Medicare would pay for a new one.  Patient is following a low sodium diet, is avoiding EtOH.      Chest pain: Pt has nitroglycerin 0.4mg SL, which she takes a couple times per year.  No issues.    Fungal: Pt has nystatin ointment and powder, miconazole powder for fungal rash in groin area, which are helpful when needed.    Coughing/SOB: Pt uses albuterol a couple times per week for cough. She reported having COPD, but does not use any other inhalers. She may start coughing if she drinks liquid too fast and has previously had trouble swallowing. Denied much other shortness of breath.  Takes guafenesin 600mg BID, which they believe was initially for chest congestion, though coughs are always pretty dry and doesn't feel that she has mucus to cough up. Med is also expensive.    Breast Cancer: Pt is taking anastrazole 1mg daily. Denied any concerns. She sees Oncology and gets mammogram every 6 months.    Urinary Frequency: Pt takes tolterodine 2mg BID, which seems to be helpful for urgency and she can get to the bathroom in time. She does still have incontinence overnight and wears pull ups, which are helpful.    Iron deficiency anemia: Pt takes ferrous gluconate 324mg three times per week. No current issues with constipation.    Supplements: Pt takes folic acid 400mcg daily, though unsure why. Chart review unable to find initial prescribing indication.  ----------------      I spent 60 minutes with this patient today. All changes were made via collaborative practice agreement with Ko Denney. A copy of the visit note was provided to the patient's provider(s).    A summary of these recommendations was given to the patient and was sent via [employer].    Aurora Sparrow, PharmD  Medication Therapy Management  Pharmacist  [facility]    Telemedicine Visit Details  Type of service:  Telephone visit  Start Time: 1:30pm  End Time: 2:30pm  Originating Location (pt. Location): Home      Distant Location (provider location):  Off-site  Provider has received verbal consent for a visit from the patient? Yes     Medication Therapy Recommendations  Hyperlipidemia LDL goal <100    Current Medication: niacin ER (NIASPAN) 500 MG CR tablet   Rationale: No medical indication at this time - Unnecessary medication therapy - Indication   Recommendation: Make Appt with PCP - consider stopping niacin- discuss with PCP   Status: Patient Agreed - Adherence/Education         Other chronic pain    Current Medication: acetaminophen (TYLENOL) 500 MG tablet   Rationale: Frequency inappropriate - Dosage too low - Effectiveness   Recommendation: Increase Frequency - ok to start taking twice daily instead of three times daily   Status: Accepted - no CPA Needed          Rationale: Synergistic therapy - Needs additional medication therapy - Indication   Recommendation: Make Appt with PCP - consider trying compounded gabapentin gel--discuss with PCP   Status: Patient Agreed - Adherence/Education

## 2022-12-02 NOTE — Clinical Note
Thanks for the MTM referral. Nothing obvious that could be contributing to tremor/shaking, but we found many other ways to reduce her pill burden and hopefully get her feeling a bit better. Let me know if you have questions! Aurora

## 2022-12-03 NOTE — PATIENT INSTRUCTIONS
"Recommendations from today's MTM visit:                                                    MTM (medication therapy management) is a service provided by a clinical pharmacist designed to help you get the most of out of your medicines.   Today we reviewed what your medicines are for, how to know if they are working, that your medicines are safe and how to make your medicine regimen as easy as possible.      1. Start taking acetaminophen 1000mg three times daily on a schedule, instead of just twice daily.  2. Please schedule annual eye exam.  3. Consider discussing possibility of gabapentin topical gel for coccyx pain.    We highlighted the following questions/concerns to bring up at your next primary care visit.  -Ok to stop niacin?  -Are folic acid and Mucinex still needed? Can we stop them?  -Need new prescription for Epipen  -Would it be ok to switch Synthroid to generic levothyroxine and adjust doses as needed?  - an you get a prescription for a new blood pressure cuff?    Follow-up: after primary care follow up or as needed    It was great speaking with you today.  I value your experience and would be very thankful for your time in providing feedback in our clinic survey. In the next few days, you may receive an email or text message from [employer] with a link to a survey related to your  clinical pharmacist.\"     To schedule another MTM appointment, please call the clinic directly or you may call the MTM scheduling line at [phone] or toll-free at [phone].     My Clinical Pharmacist's contact information:                                                      Please feel free to contact me with any questions or concerns you have.      Aurora Sparrow, Jake  Medication Therapy Management Pharmacist  [facility]  "

## 2022-12-05 NOTE — PROGRESS NOTES
Nephrology Clinic [facility] Visit  12/6/2022    Last visit 9/13/2022  Next visit 3 months with Dr. Nunez    PHQ - 9 and DARIUS completed - see screening note    Assessment and Plan:     # CKD 3b with microalbuminuria : Baseline Cr ~1.5-2.0 with significant fluctuation since 2005 with persistent microalbuminuria. She has overall remained stable with Cr even better recently than prior baseline at 1.1-1.5 . Previously hospitalized due to fall and confusion. Due to recent fall (though it was mechanical/weakness) and hx of fainting, I'm hesitant to increase her losartan at this time, but would consider it in the future if microalbuminuria continues to increase and/or if SBP rises to >150. Plan for follow up in 6 months.  - No change in management  - Follow up in 3 months with Dr. Nunez     # Hypertension: BP averaging 120s -160 systolic  - current regimen: losartan 25 mg daily, metoprolol 12.5 mg in am, 25 mg in pm, bumex 4 mg daily (has MEMS and follows with cardiology).   Borderline control, however has hx of fainting with stricter BP management and had recent fall. Risks of increase BP meds outweigh benefits at this time. If SBP consistently >150, would increase losartan to 50mg daily.     # Electrolytes:   - last Potassium 4, sodium 142, bicarb 28.  - stable will monitor  - check renal panel in the next couple of weeks     # Mineral Bone Disorder:   Ca 10.5, 9/7/22 PTH 78, Vit D 50, no recent phos.   - Not on Vit D (appropriately).  - hypercalcemia, could be in setting of decreased hydration limited mobility  - has been drinking more water  - if remains elevated, check SPEP and IF    #CAD risk - on atorvastatin    #History of Thyroid cancer  - on levothyroxine  - managed by PCP    #DM 2  - 7/12/22 HgbA1c 7.2 - has follow up with PCP on 12/8      Assessment and plan was discussed with patient and she voiced her understanding and agreement.    Disposition: follow up in 3 months with Dr. Nunez.    Reason for  Visit:  Mariel Ribeiro is a 77 year old female with history of type II diabetes mellitus, kidney failure, TIA, CHF, CAD status post CABG, COPD, thyroid cancer status post thyroidectomy and resultant hypothyroidism, and hypertension, who presents for management of CKD and proteinuria.    HPI:  She has been hospitalized due to falls and confusion. She spent time in TCU and is now back home. On speaking with her and her roommate, she is doing much better. She seems stronger and is getting around more confidently. Completed home PT and OT.    She denies any lightheadedness, dizziness, or orthostatic symptoms. Her BP has mostly been in the 120s-150/70-80s, with occasional SBP up to 160. She has not had any recorded low blood pressures. Her weight has been stable on her current diuretic dose. She monitors volume status with CardioMEM device in place. No SOB, no chest pain, appetite is good, no n/v/d. She has lymphedema and legs are wrapped. She has noticed some improvement in this recently.    She was recently seen by neurology for cognitive decline. No definite cause noted. She met with pharmacology medical management and may be able to stop niacin.    She continues to have chronic pain, not much has helped. She is avoiding ibuprofen.     ROS:  A comprehensive review of systems was obtained and negative, except as noted in the HPI or PMH.    Active Medical Problems:  Patient Active Problem List   Diagnosis     Hypothyroidism     Diverticulitis of colon     Coronary atherosclerosis     Myalgia and myositis     Migraine     Chronic airway obstruction/ COPD     Mononeuritis     Obstructive sleep apnea     Vitamin D deficiency     Hypertension goal BP (blood pressure) < 130/80     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)     Hyperlipidemia with target LDL less than 70     Chronic diastolic heart failure (H)     Osteoarthritis     Morbid obesity (H)     Arthritis of knee     Transient cerebral ischemia     History of  thyroid cancer     Cervicalgia     Fatty liver disease, nonalcoholic     Hepatomegaly     Angina at rest (H)     S/P CABG x 3     Type 2 diabetes mellitus with stage 3 chronic kidney disease, with long-term current use of insulin (H)     Lymphedema     Lower back pain     Bilateral carotid artery disease (H)     Spinal stenosis of lumbar region, unspecified whether neurogenic claudication present     Status post coronary angiogram     Hemoptysis     Intertrigo     Malignant neoplasm of lower-inner quadrant of left breast in female, estrogen receptor positive (H)     Pain in the coccyx     Facet arthropathy     CKD (chronic kidney disease) stage 4, GFR 15-29 ml/min (H)     Facet arthropathy, lumbar     Temporal pain     Elevated C-reactive protein     Hypertensive chronic kidney disease with stage 5 chronic kidney disease or end stage renal disease (H)     Generalized weakness     Recurrent falls       Personal Hx:   Social History     Tobacco Use     Smoking status: Former     Packs/day: 0.00     Years: 27.00     Pack years: 0.00     Types: Cigarettes     Quit date: 1989     Years since quittin.4     Smokeless tobacco: Never   Substance Use Topics     Alcohol use: No     Alcohol/week: 0.0 standard drinks       Allergies: Reviewed by provider.     Medications:  Current Outpatient Medications   Medication Sig     acetaminophen (TYLENOL) 500 MG tablet Take 1,000 mg by mouth every 6 hours as needed for mild pain     albuterol (PROAIR HFA/PROVENTIL HFA/VENTOLIN HFA) 108 (90 Base) MCG/ACT inhaler INHALE TWO PUFFS INTO LUNGS EVERY SIX HOURS     anastrozole (ARIMIDEX) 1 MG tablet Take 1 tablet (1 mg) by mouth daily     aspirin (ASA) 81 MG EC tablet Take 1 tablet (81 mg) by mouth daily     atorvastatin (LIPITOR) 40 MG tablet Take 1 tablet (40 mg) by mouth in the morning.     bumetanide (BUMEX) 1 MG tablet 4 mg daily     Continuous Blood Gluc  (FREESTYLE MARTY 14 DAY READER) JEZ 1 Units 4 times daily (before  meals and nightly)     Continuous Blood Gluc Sensor (FREESTYLE MARTY 14 DAY SENSOR) [facility] PLACE NEW SENSOR TO BACK OF ARM EVERY 14 DAYS TO MONITOR BLOOD SUGARS     DULoxetine (CYMBALTA) 30 MG capsule Take 1 capsule (30 mg) by mouth 2 times daily     EPINEPHrine (ANY BX GENERIC EQUIV) 0.3 MG/0.3ML injection 2-pack Inject 0.3 mLs (0.3 mg) into the muscle once as needed for anaphylaxis     ferrous gluconate (FERGON) 324 (38 Fe) MG tablet TAKE 1 TABLET BY MOUTH EVERY MONDAY, WEDNESDAY, AND FRIDAY MORNING     folic acid (FOLVITE) 400 MCG tablet Take 400 mcg by mouth daily     guaiFENesin (MUCINEX) 600 MG 12 hr tablet Take 1 tablet (600 mg) by mouth 2 times daily     insulin glargine (LANTUS SOLOSTAR) 100 UNIT/ML pen Inject 29 Units Subcutaneous daily     levothyroxine (SYNTHROID) 150 MCG tablet Take 1 tablet (150 mcg) by mouth daily     losartan (COZAAR) 25 MG tablet Take 1 tablet (25 mg) by mouth daily     metoprolol succinate ER (TOPROL-XL) 25 MG 24 hr tablet Take 0.5 tablets (12.5 mg) by mouth every morning AND 1 tablet (25 mg) every evening.     miconazole (MICATIN/MICRO GUARD) 2 % external powder Apply topically as needed for itching or other Redness in bilateral groin and  clef     niacin ER (NIASPAN) 500 MG CR tablet TAKE 1 TABLET(500 MG) BY MOUTH TWICE DAILY     nitroGLYcerin (NITROSTAT) 0.4 MG sublingual tablet For chest pain place 1 tablet under the tongue every 5 minutes for 3 doses. If symptoms persist 5 minutes after 1st dose call 911.     nystatin (MYCOSTATIN) 130755 UNIT/GM external ointment Apply topically 2 times daily Apply to external vagina 2 times daily.     nystatin POWD 1 Dose 4 times daily     potassium chloride ER (KLOR-CON M) 10 MEQ CR tablet Take 2 tablets (20 mEq) by mouth 2 times daily     pregabalin (LYRICA) 100 MG capsule TAKE 1 CAPSULE(100 MG) BY MOUTH TWICE DAILY     repaglinide (PRANDIN) 1 MG tablet Take 1 tablet (1 mg) by mouth 3 times daily (before meals)     Skin Protectants,  "Misc. ([facility] AG TEXTILE 10\"X144\") SHETOM Externally apply 1 Box topically daily Apply to areas of intertrigo prn     tolterodine ER (DETROL LA) 2 MG 24 hr capsule TAKE 2 CAPSULES BY MOUTH DAILY     No current facility-administered medications for this visit.     Facility-Administered Medications Ordered in Other Visits   Medication     sodium chloride (PF) 0.9% PF flush 10 mL       Vitals:  There were no vitals taken for this visit.    Exam:  Vital signs were deferred for this telemedicine visit.      LABS:   CMP  Recent Labs   Lab Test 10/19/22  0751 09/07/22  1705 07/16/22  1018 07/15/22  2211 07/14/22  1832 07/14/22  1537 07/15/21  0948 07/09/21  1700 04/29/21  1116 03/18/21  1423 01/26/21  0000 12/22/20  0928    135 142  --   --  142   < > 142 140 139  --  141   POTASSIUM 4.0 3.9 3.7  --   --  3.8   < > 3.7 3.7 3.7  --  3.9   CHLORIDE 102 104 106  --   --  104   < > 107 105 107  --  108   CO2 28 24 24  --   --  27   < > 33* 29 28  --  28   ANIONGAP 12 7 12  --   --  11   < > 1* 6 4  --  5   * 137* 156* 197*   < > 177*   < > 134* 155* 174*   < > 164*   BUN 36.5* 49* 27.2*  --   --  30.0*   < > 38* 36* 32*  --  33*   CR 1.45* 1.45* 1.17*  --   --  1.50*   < > 1.31* 1.51* 1.36*  --  1.52*   GFRESTIMATED 37* 37* 48*  --   --  36*   < > 40* 33* 38*  --  33*   GFRESTBLACK  --   --   --   --   --   --   --  46* 39* 44*  --  39*   ANTOINETTE 10.5* 10.3* 9.9  --   --  9.7   < > 9.6 9.6 9.4  --  10.0    < > = values in this interval not displayed.     Recent Labs   Lab Test 07/16/22  1018 07/12/22  1423 03/09/22  1304 07/09/21  1700   BILITOTAL 0.6 0.4 0.5 0.4   ALKPHOS 88 95 109 108   ALT 11 17 24 22   AST 26 20 19 20     CBC  Recent Labs   Lab Test 09/07/22  1705 07/16/22  1018 07/14/22  1537 07/13/22  0619   HGB 13.4 14.3 14.0 14.9   WBC 9.8 9.4 9.6 9.4   RBC 4.53 4.84 4.85 5.09   HCT 42.2 43.3 44.2 46.3   MCV 93 90 91 91   MCH 29.6 29.5 28.9 29.3   MCHC 31.8 33.0 31.7 32.2   RDW 13.0 13.2 13.7 13.2    " 142* 129* 142*     URINE STUDIES  Recent Labs   Lab Test 09/26/22  1414 09/07/22  1541 09/04/22  1220 07/12/22  1132 04/29/21  1117 06/30/18  1810 11/15/17  1717   COLOR Yellow Yellow Yellow Light Yellow Yellow   < > Yellow   APPEARANCE Clear Cloudy* Cloudy* Clear Clear   < > Clear   URINEGLC Negative Negative Negative Negative Negative   < > Negative   URINEBILI Negative Negative Negative Negative Negative   < > Negative   URINEKETONE Negative Negative Negative Negative Negative   < > Negative   SG 1.015 1.010 1.016 1.015 1.015   < > 1.010   UBLD Moderate* Small* 1.0 mg/dL* Negative Negative   < > Trace*   URINEPH 5.0 5.5 5.5 5.5 5.0   < > 6.0   PROTEIN 30* Negative 100* 50* Negative   < > Negative   UROBILINOGEN 0.2 0.2  --   --  0.2  --  0.2   NITRITE Negative Negative Negative Negative Negative   < > Negative   LEUKEST Small* Moderate* 500 Janette/uL* Negative Negative   < > Moderate*   RBCU 5-10* 0-2 22* <1  --    < > O - 2   WBCU 0-5 25-50* >182* 0  --    < > 10-25*    < > = values in this interval not displayed.     Recent Labs   Lab Test 03/16/21  1430   UTPG 0.15     PTH  Recent Labs   Lab Test 09/07/22  1717 03/18/21  1423   PTHI 78* 71     IRON STUDIES  Recent Labs   Lab Test 08/15/18  1353 07/14/18  1122 07/14/18  0900 07/14/18  0700   IRON 19* 24* Unsatisfactory specimen - hemolyzed Unsatisfactory specimen - hemolyzed   * 322 Unsatisfactory specimen - hemolyzed Unsatisfactory specimen - hemolyzed   IRONSAT 4* 7* Not Calculated Not Calculated   CARMEN 22  --   --  58       Gwendolyn Mobley PA-C    Visit length 13 minutes. An additional 25 minutes were spent on date of service in chart review, documentation, and other activities as noted.

## 2022-12-06 ENCOUNTER — VIRTUAL VISIT (OUTPATIENT)
Dept: NEPHROLOGY | Facility: CLINIC | Age: 77
End: 2022-12-06
Attending: PHYSICIAN ASSISTANT
Payer: MEDICARE

## 2022-12-06 VITALS — BODY MASS INDEX: 48.55 KG/M2 | WEIGHT: 293 LBS

## 2022-12-06 DIAGNOSIS — R80.9 POSITIVE FOR MACROALBUMINURIA: ICD-10-CM

## 2022-12-06 DIAGNOSIS — R60.9 EDEMA, UNSPECIFIED TYPE: ICD-10-CM

## 2022-12-06 DIAGNOSIS — E83.52 HYPERCALCEMIA: ICD-10-CM

## 2022-12-06 DIAGNOSIS — N18.32 CHRONIC KIDNEY DISEASE, STAGE 3B (H): Primary | ICD-10-CM

## 2022-12-06 DIAGNOSIS — I10 HYPERTENSION, ESSENTIAL: ICD-10-CM

## 2022-12-06 PROCEDURE — 99214 OFFICE O/P EST MOD 30 MIN: CPT | Mod: 95 | Performed by: PHYSICIAN ASSISTANT

## 2022-12-06 ASSESSMENT — PAIN SCALES - GENERAL: PAINLEVEL: SEVERE PAIN (7)

## 2022-12-06 NOTE — PATIENT INSTRUCTIONS
Check blood pressure daily and keep log.   Continue hydration, low sodium diet.   Labs in the next couple of weeks. Will call with results/recommendations.   Avoid ibuprofen/aleve  Follow up in 3 months with Dr. Nunez.   Call sooner with any questions or concerns.

## 2022-12-06 NOTE — LETTER
12/6/2022       RE: Mariel Ribeiro  [address] [place] [address]     Dear Colleague,    Thank you for referring your patient, Mariel Ribeiro, to the [facility] [place] [facility]. Please see a copy of my visit note below.      Nephrology Clinic [facility] Visit  12/6/2022    Last visit 9/13/2022  Next visit 3 months with Dr. Nunez    PHQ - 9 and DARIUS completed - see screening note    Assessment and Plan:     # CKD 3b with microalbuminuria : Baseline Cr ~1.5-2.0 with significant fluctuation since 2005 with persistent microalbuminuria. She has overall remained stable with Cr even better recently than prior baseline at 1.1-1.5 . Previously hospitalized due to fall and confusion. Due to recent fall (though it was mechanical/weakness) and hx of fainting, I'm hesitant to increase her losartan at this time, but would consider it in the future if microalbuminuria continues to increase and/or if SBP rises to >150. Plan for follow up in 6 months.  - No change in management  - Follow up in 3 months with Dr. Nunez     # Hypertension: BP averaging 120s -160 systolic  - current regimen: losartan 25 mg daily, metoprolol 12.5 mg in am, 25 mg in pm, bumex 4 mg daily (has MEMS and follows with cardiology).   Borderline control, however has hx of fainting with stricter BP management and had recent fall. Risks of increase BP meds outweigh benefits at this time. If SBP consistently >150, would increase losartan to 50mg daily.     # Electrolytes:   - last Potassium 4, sodium 142, bicarb 28.  - stable will monitor  - check renal panel in the next couple of weeks     # Mineral Bone Disorder:   Ca 10.5, 9/7/22 PTH 78, Vit D 50, no recent phos.   - Not on Vit D (appropriately).  - hypercalcemia, could be in setting of decreased hydration limited mobility  - has been drinking more water  - if remains elevated, check SPEP and IF    #CAD risk - on  atorvastatin    #History of Thyroid cancer  - on levothyroxine  - managed by PCP    #DM 2  - 7/12/22 HgbA1c 7.2 - has follow up with PCP on 12/8      Assessment and plan was discussed with patient and she voiced her understanding and agreement.    Disposition: follow up in 3 months with Dr. Nunez.    Reason for Visit:  Mariel Ribeiro is a 77 year old female with history of type II diabetes mellitus, kidney failure, TIA, CHF, CAD status post CABG, COPD, thyroid cancer status post thyroidectomy and resultant hypothyroidism, and hypertension, who presents for management of CKD and proteinuria.    HPI:  She has been hospitalized due to falls and confusion. She spent time in TCU and is now back home. On speaking with her and her roommate, she is doing much better. She seems stronger and is getting around more confidently. Completed home PT and OT.    She denies any lightheadedness, dizziness, or orthostatic symptoms. Her BP has mostly been in the 120s-150/70-80s, with occasional SBP up to 160. She has not had any recorded low blood pressures. Her weight has been stable on her current diuretic dose. She monitors volume status with CardioMEM device in place. No SOB, no chest pain, appetite is good, no n/v/d. She has lymphedema and legs are wrapped. She has noticed some improvement in this recently.    She was recently seen by neurology for cognitive decline. No definite cause noted. She met with pharmacology medical management and may be able to stop niacin.    She continues to have chronic pain, not much has helped. She is avoiding ibuprofen.     ROS:  A comprehensive review of systems was obtained and negative, except as noted in the HPI or PMH.    Active Medical Problems:  Patient Active Problem List   Diagnosis     Hypothyroidism     Diverticulitis of colon     Coronary atherosclerosis     Myalgia and myositis     Migraine     Chronic airway obstruction/ COPD     Mononeuritis     Obstructive sleep apnea     Vitamin  D deficiency     Hypertension goal BP (blood pressure) < 130/80     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)     Hyperlipidemia with target LDL less than 70     Chronic diastolic heart failure (H)     Osteoarthritis     Morbid obesity (H)     Arthritis of knee     Transient cerebral ischemia     History of thyroid cancer     Cervicalgia     Fatty liver disease, nonalcoholic     Hepatomegaly     Angina at rest (H)     S/P CABG x 3     Type 2 diabetes mellitus with stage 3 chronic kidney disease, with long-term current use of insulin (H)     Lymphedema     Lower back pain     Bilateral carotid artery disease (H)     Spinal stenosis of lumbar region, unspecified whether neurogenic claudication present     Status post coronary angiogram     Hemoptysis     Intertrigo     Malignant neoplasm of lower-inner quadrant of left breast in female, estrogen receptor positive (H)     Pain in the coccyx     Facet arthropathy     CKD (chronic kidney disease) stage 4, GFR 15-29 ml/min (H)     Facet arthropathy, lumbar     Temporal pain     Elevated C-reactive protein     Hypertensive chronic kidney disease with stage 5 chronic kidney disease or end stage renal disease (H)     Generalized weakness     Recurrent falls       Personal Hx:   Social History     Tobacco Use     Smoking status: Former     Packs/day: 0.00     Years: 27.00     Pack years: 0.00     Types: Cigarettes     Quit date: 1989     Years since quittin.4     Smokeless tobacco: Never   Substance Use Topics     Alcohol use: No     Alcohol/week: 0.0 standard drinks       Allergies: Reviewed by provider.     Medications:  Current Outpatient Medications   Medication Sig     acetaminophen (TYLENOL) 500 MG tablet Take 1,000 mg by mouth every 6 hours as needed for mild pain     albuterol (PROAIR HFA/PROVENTIL HFA/VENTOLIN HFA) 108 (90 Base) MCG/ACT inhaler INHALE TWO PUFFS INTO LUNGS EVERY SIX HOURS     anastrozole (ARIMIDEX) 1 MG tablet Take 1 tablet (1 mg) by mouth  daily     aspirin (ASA) 81 MG EC tablet Take 1 tablet (81 mg) by mouth daily     atorvastatin (LIPITOR) 40 MG tablet Take 1 tablet (40 mg) by mouth in the morning.     bumetanide (BUMEX) 1 MG tablet 4 mg daily     Continuous Blood Gluc  (FREESTYLE MARTY 14 DAY READER) JEZ 1 Units 4 times daily (before meals and nightly)     Continuous Blood Gluc Sensor (FREESTYLE MARTY 14 DAY SENSOR) [facility] NEW SENSOR TO BACK OF ARM EVERY 14 DAYS TO MONITOR BLOOD SUGARS     DULoxetine (CYMBALTA) 30 MG capsule Take 1 capsule (30 mg) by mouth 2 times daily     EPINEPHrine (ANY BX GENERIC EQUIV) 0.3 MG/0.3ML injection 2-pack Inject 0.3 mLs (0.3 mg) into the muscle once as needed for anaphylaxis     ferrous gluconate (FERGON) 324 (38 Fe) MG tablet TAKE 1 TABLET BY MOUTH EVERY MONDAY, WEDNESDAY, AND FRIDAY MORNING     folic acid (FOLVITE) 400 MCG tablet Take 400 mcg by mouth daily     guaiFENesin (MUCINEX) 600 MG 12 hr tablet Take 1 tablet (600 mg) by mouth 2 times daily     insulin glargine (LANTUS SOLOSTAR) 100 UNIT/ML pen Inject 29 Units Subcutaneous daily     levothyroxine (SYNTHROID) 150 MCG tablet Take 1 tablet (150 mcg) by mouth daily     losartan (COZAAR) 25 MG tablet Take 1 tablet (25 mg) by mouth daily     metoprolol succinate ER (TOPROL-XL) 25 MG 24 hr tablet Take 0.5 tablets (12.5 mg) by mouth every morning AND 1 tablet (25 mg) every evening.     miconazole (MICATIN/MICRO GUARD) 2 % external powder Apply topically as needed for itching or other Redness in bilateral groin and katharine clef     niacin ER (NIASPAN) 500 MG CR tablet TAKE 1 TABLET(500 MG) BY MOUTH TWICE DAILY     nitroGLYcerin (NITROSTAT) 0.4 MG sublingual tablet For chest pain place 1 tablet under the tongue every 5 minutes for 3 doses. If symptoms persist 5 minutes after 1st dose call 911.     nystatin (MYCOSTATIN) 314699 UNIT/GM external ointment Apply topically 2 times daily Apply to external vagina 2 times daily.     nystatin POWD 1 Dose 4 times  "daily     potassium chloride ER (KLOR-CON M) 10 MEQ CR tablet Take 2 tablets (20 mEq) by mouth 2 times daily     pregabalin (LYRICA) 100 MG capsule TAKE 1 CAPSULE(100 MG) BY MOUTH TWICE DAILY     repaglinide (PRANDIN) 1 MG tablet Take 1 tablet (1 mg) by mouth 3 times daily (before meals)     Skin Protectants, Misc. (INTERDRY AG TEXTILE 10\"X144\") SHEE Externally apply 1 Box topically daily Apply to areas of intertrigo prn     tolterodine ER (DETROL LA) 2 MG 24 hr capsule TAKE 2 CAPSULES BY MOUTH DAILY     No current facility-administered medications for this visit.     Facility-Administered Medications Ordered in Other Visits   Medication     sodium chloride (PF) 0.9% PF flush 10 mL       Vitals:  There were no vitals taken for this visit.    Exam:  Vital signs were deferred for this telemedicine visit.      LABS:   CMP  Recent Labs   Lab Test 10/19/22  0751 09/07/22  1705 07/16/22  1018 07/15/22  2211 07/14/22  1832 07/14/22  1537 07/15/21  0948 07/09/21  1700 04/29/21  1116 03/18/21  1423 01/26/21  0000 12/22/20  0928    135 142  --   --  142   < > 142 140 139  --  141   POTASSIUM 4.0 3.9 3.7  --   --  3.8   < > 3.7 3.7 3.7  --  3.9   CHLORIDE 102 104 106  --   --  104   < > 107 105 107  --  108   CO2 28 24 24  --   --  27   < > 33* 29 28  --  28   ANIONGAP 12 7 12  --   --  11   < > 1* 6 4  --  5   * 137* 156* 197*   < > 177*   < > 134* 155* 174*   < > 164*   BUN 36.5* 49* 27.2*  --   --  30.0*   < > 38* 36* 32*  --  33*   CR 1.45* 1.45* 1.17*  --   --  1.50*   < > 1.31* 1.51* 1.36*  --  1.52*   GFRESTIMATED 37* 37* 48*  --   --  36*   < > 40* 33* 38*  --  33*   GFRESTBLACK  --   --   --   --   --   --   --  46* 39* 44*  --  39*   ANTOINETTE 10.5* 10.3* 9.9  --   --  9.7   < > 9.6 9.6 9.4  --  10.0    < > = values in this interval not displayed.     Recent Labs   Lab Test 07/16/22  1018 07/12/22  1423 03/09/22  1304 07/09/21  1700   BILITOTAL 0.6 0.4 0.5 0.4   ALKPHOS 88 95 109 108   ALT 11 17 24 22   AST 26 " 20 19 20     CBC  Recent Labs   Lab Test 09/07/22  1705 07/16/22  1018 07/14/22  1537 07/13/22  0619   HGB 13.4 14.3 14.0 14.9   WBC 9.8 9.4 9.6 9.4   RBC 4.53 4.84 4.85 5.09   HCT 42.2 43.3 44.2 46.3   MCV 93 90 91 91   MCH 29.6 29.5 28.9 29.3   MCHC 31.8 33.0 31.7 32.2   RDW 13.0 13.2 13.7 13.2    142* 129* 142*     URINE STUDIES  Recent Labs   Lab Test 09/26/22  1414 09/07/22  1541 09/04/22  1220 07/12/22  1132 04/29/21  1117 06/30/18  1810 11/15/17  1717   COLOR Yellow Yellow Yellow Light Yellow Yellow   < > Yellow   APPEARANCE Clear Cloudy* Cloudy* Clear Clear   < > Clear   URINEGLC Negative Negative Negative Negative Negative   < > Negative   URINEBILI Negative Negative Negative Negative Negative   < > Negative   URINEKETONE Negative Negative Negative Negative Negative   < > Negative   SG 1.015 1.010 1.016 1.015 1.015   < > 1.010   UBLD Moderate* Small* 1.0 mg/dL* Negative Negative   < > Trace*   URINEPH 5.0 5.5 5.5 5.5 5.0   < > 6.0   PROTEIN 30* Negative 100* 50* Negative   < > Negative   UROBILINOGEN 0.2 0.2  --   --  0.2  --  0.2   NITRITE Negative Negative Negative Negative Negative   < > Negative   LEUKEST Small* Moderate* 500 Janette/uL* Negative Negative   < > Moderate*   RBCU 5-10* 0-2 22* <1  --    < > O - 2   WBCU 0-5 25-50* >182* 0  --    < > 10-25*    < > = values in this interval not displayed.     Recent Labs   Lab Test 03/16/21  1430   UTPG 0.15     PTH  Recent Labs   Lab Test 09/07/22  1717 03/18/21  1423   PTHI 78* 71     IRON STUDIES  Recent Labs   Lab Test 08/15/18  1353 07/14/18  1122 07/14/18  0900 07/14/18  0700   IRON 19* 24* Unsatisfactory specimen - hemolyzed Unsatisfactory specimen - hemolyzed   * 322 Unsatisfactory specimen - hemolyzed Unsatisfactory specimen - hemolyzed   IRONSAT 4* 7* Not Calculated Not Calculated   CARMEN 22  --   --  58       Gwendolyn Mobley PA-C    Visit length 13 minutes. An additional 25 minutes were spent on date of service in chart review,  documentation, and other activities as noted.     Mariel is a 77 year old who is being evaluated via a billable video visit.      How would you like to obtain your AVS? MyChart  If the video visit is dropped, the invitation should be resent by: Send to e-mail at: [email or web address]        Again, thank you for allowing me to participate in the care of your patient.      Sincerely,    BUCKY MARTINEZ PA-C

## 2022-12-06 NOTE — PROGRESS NOTES
Video: 1:50 - 2:09    [facility]  Endocrinology  Muriel Will PA-C  12/13/2022      Chief Complaint:   Diabetes    History of Present Illness:   Mariel Ribeiro is a 75 year old female with a history of type II diabetes mellitus, kidney failure, TIA, CHF, CAD status post CABG, COPD, thyroid cancer status post thyroidectomy and resultant hypothyroidism, and hypertension who presents for  follow up of her diabetes.     She was diagnosed with type 2 diabetes mellitus in 2007. Initially managed with oral Metformin, Januvia, and Glimepiride, all have been discontinued due to declining renal fucntion. She was reasonably well managed with NPH 28 units bid and Prandin with meals when seen 10/31/2019.  I saw her as a hospital follow-up in December 2019 with BG above goal and advised increasing Lantus to 31 and if needed 33 units daily and continuing prandin with meals and advised she see CDE.    Other recent visit notes:    Feb 2021: Met with Mariel and roommate Odalis.   Newer eye glasses not working, concerns about cognition and referred for neurology.  Concerned about renal disease, BG at goal.  Discussed starting Empagliflozin in place of Prandin but did not.      Feb 2022: BG at goal with Lantus 32 u, bid Prandin with meals, and careful diet but with 4% hypoglycemia.  Advised to decrease her Lantus to 29 units daily and take her Lantus at the same time daily.    August 2022: Mariel just returned home from [facility] after hospitalization due to a fall and then rehab.  Her blood sugar was at goal without hypoglycemia and advised to continue Lantus 29 units with 1 mg of her Repaglinide with each meal.      Today:  With Odalis today. They note have not been checking sensor as often. Still eating breakfast around noon and then an evening meal.    She is drinking a lot of water, she just has always liked this.  She sleeps through the night most times, may be up at 5 or 6 am, but generally not sooner - sleeps pretty well.   She was having some incontinence issues so able to get through night without getting up to urinate most nights.  She denies symptoms of high or low blood sugars since we last met.  She is seeing Dr. Fitzgerald and having her nails trimmed regularly.  She has any changes in her vision or lesions or sores on her feet.  They have rescheduled her eye exam high on her list -she missed her last month due to hospitalization.    Current DM therapy:  Lantus 29 units daily - giving around 1 pm.    Prandin/ repaglinide:  1 mg twice daily with each of her meals.   -They reliably take with breakfast, but less frequently with dinner.        Blood Glucose Monitoring:              Diabetes monitoring and complications:  CAD: Yes  Last eye exam results:Still need to reschedule (was while hospitalized.)   Microalbuminuria: 31.53 (5/22/2019) - followed by nephrology.   Neuropathy: Yes  HTN: Yes  On Statin: Yes  On Aspirin: Yes  Depression: Yes  Erectile dysfunction: N/A      PMH: reviewed - any significant changes noted above.  Meds: Reviewed and updated in module and above.  Allergies: Reviewed and where applicable updated in module.  SH: Pertinent items reviewed with patient and changes noted above.  FH: Pertinent reviewed with patient and noted above.    ROS: Brief review of symptoms reveals stable condition except where noted above.         Social History:   She lives with her roommate Odalis and her dog Rimma. Odalis is an  and still working form home.  They eat between 8 and 10 pm.    Tobacco Use: former smoker, 27 years, quit 1989  Alcohol Use: none  Drug Use: none  PCP: Malika Wolf      Physical Exam:   There were no vitals taken for this visit.     Wt Readings from Last 10 Encounters:   12/06/22 140.6 kg (310 lb)   10/19/22 136.5 kg (301 lb)   10/12/22 136.8 kg (301 lb 9.6 oz)   09/13/22 (!) 158.8 kg (350 lb)   08/02/22 142.6 kg (314 lb 6.4 oz)   07/30/22 144.3 kg (318 lb 3.2 oz)   07/27/22 136.1 kg (300 lb)   07/27/22  "142.4 kg (314 lb)   07/13/22 137.4 kg (303 lb)   04/20/22 144.7 kg (319 lb)      General: Pleasant, well nourished and hydrated obese female seated in her home in [facility].  Accompanied by Odalis.  Psych:  Mood is \"good,\" affect is appropriate.  Many questions are answered by Odalis, but Mariel also responds to a couple.  No display of insight or recollection observed.  HEENT: Eyes and sclera are clear. Extraocular movements are grossly intact without proptosis.  Nares are patent, mucous membranes moist.  Neck: No masses or JVD are noted.    Resp: Easy and unlabored breathing.   Neuro: Alert and oriented.  Voice is clear and articulate.  Ext: no swelling or edema     Data:  Lab Results   Component Value Date    GFRESTIMATED 37 (L) 10/19/2022    GFRESTIMATED 37 (L) 09/07/2022    GFRESTIMATED 48 (L) 07/16/2022    GFRESTBLACK 46 (L) 07/09/2021    GFRESTBLACK 39 (L) 04/29/2021    GFRESTBLACK 44 (L) 03/18/2021      Recent Labs   Lab Test 10/19/22  0751 09/07/22  1705 07/14/22  1537 07/13/22  1642 07/13/22  0619 07/12/22  1423 03/09/22  1304 01/20/22  1500 07/09/21  1700 04/29/21  1116 03/18/21  1423 03/02/21  1633 11/06/19  1233 10/31/19  0000 10/30/19  1537 10/01/19  1133 04/09/19  2050 04/03/19  0000   A1C  --   --   --   --   --  7.2* 6.8*  --    < > 6.8*  --   --    < >  --   --   --    < >  --    HEMOGLOBINA1  --   --   --   --   --   --   --   --   --   --   --   --   --  7.2*  --   --   --  6.5*   TSH  --   --   --  1.98  --   --  3.56  --   --  7.12*  --   --   --   --   --  5.02*  --   --    T4  --   --   --   --   --   --   --   --   --  0.87  --   --   --   --   --  0.97  --   --    *  --   --   --   --   --  89  --   --  117*  --   --   --   --   --   --   --   --    HDL 54  --   --   --   --   --  57  --   --  57  --   --   --   --   --   --   --   --    TRIG 149  --   --   --   --   --  131  --   --  191*  --   --   --   --   --   --   --   --    CR 1.45* 1.45*   < >  --    < > 1.25* 1.37*  --    < > 1.51*   " < >  --    < >  --    < > 1.59*   < >  --    MICROL  --   --   --   --   --   --   --  326  --   --   --  39  --   --   --   --    < >  --     < > = values in this interval not displayed.         Assessment and Plan:  Type 2 diabetes mellitus with stage 3 chronic kidney disease, with long-term current use of insulin (H)    Mariel is a 77 year old female with type II diabetes complicated by TIA, CHF, CAD status post CABG, COPD, thyroid cancer status post thyroidectomy and resultant hypothyroidism, obesity, depression, chronic pain and hypertension.    Mariel continues to live at home with the support of Odalis.  Her blood sugar appears to be managed at goal without any hypoglycemia.      Plan:  Please continue to take Lantus 29 units once daily and Repaglinide with each of your meals.  Please update your A1c tomorrow when you visit lab.  If it is greater than 8, it is very important that you take repaglinide with your evening meal as well.    Also if the A1c is 8 I will want you to repeat it again in 3 months after being more diligent with the Repaglinide.  I also would ask that you try to scan the blood sugar upon awakening or at breakfast more frequently prior to your next visit if A1c is greater than 8.    TSH will also be updated.      HTN/CKD:  She will continue to follow-up with nephrology.        DM Complications-   Retinopathy:   They will reschedule IN visit .  nephropathy:  Yes, she will continue to see Dr. Medina.  Neuropathy: Yes peripheral neuropathy.  She sees Dr. Fitzgerald in podiatry   feet: She sees Dr. Fitzgerald in podiatry   Lipids: Has CAD and is taking a statin.     Hypothyroidism: Her TSH was within normal range July 2022.  She will continue to take her current dose of levothyroxine.  We will update her dose again next summer.     Follow-up: Return in 4 months   34 minutes in preparation for visit reviewing chart, labs and documentation, visiting with patient gathering history and in exam, education  and counseling, as well as coordination of care, further chart review and documentation following visit on this date of service and as alluded to documented above.  Extended time and education regarding concerns with hypoglycemia.      It is my privilege to be involved in the care of the above patient.     Muriel Will PA-C, MPAS  [facility]  Diabetes, Endocrinology, and Metabolism  [phone] Appointments/Nurse  [phone] Fax  [phone] pager  [phone] nurse line

## 2022-12-06 NOTE — PROGRESS NOTES
Mariel is a 77 year old who is being evaluated via a billable video visit.      How would you like to obtain your AVS? MyChart  If the video visit is dropped, the invitation should be resent by: Send to e-mail at: [email or web address]  Will anyone else be joining your video visit? Yes tatyana Jacobo      Video-Visit Details    Video Start Time: 3:13 pm    Type of service:  Video Visit    Video End Time: 3:26 pm    Originating Location (pt. Location): Home        Distant Location (provider location):  offsite    Platform used for Video Visit: [employer]     Pt has previously been informed of lab results.   He been re-informed

## 2022-12-07 ENCOUNTER — CARE COORDINATION (OUTPATIENT)
Dept: CARDIOLOGY | Facility: CLINIC | Age: 77
End: 2022-12-07

## 2022-12-07 NOTE — PROGRESS NOTES
[facility] - CardioMEMS Reading Review    December 7, 2022   CardioMEMS reviewed.  Current diuretic: Bumex 4 mg daily  Recent plan of care changes: No changes.    Current Threshold parameters:       Today's Waveform:       Readings:           RHC Date Wedge Pressure MEMS PAD during cath  (average of the 3 values)     7/1/2019 w/MEMS implant     20 mmHg   18 mmHg           Rosina Mays RN

## 2022-12-08 ENCOUNTER — VIRTUAL VISIT (OUTPATIENT)
Dept: FAMILY MEDICINE | Facility: CLINIC | Age: 77
End: 2022-12-08
Attending: NURSE PRACTITIONER
Payer: MEDICARE

## 2022-12-08 DIAGNOSIS — G89.4 CHRONIC PAIN SYNDROME: ICD-10-CM

## 2022-12-08 DIAGNOSIS — I65.29 STENOSIS OF CAROTID ARTERY, UNSPECIFIED LATERALITY: ICD-10-CM

## 2022-12-08 DIAGNOSIS — Z79.899 POLYPHARMACY: ICD-10-CM

## 2022-12-08 DIAGNOSIS — T63.444S ANAPHYLACTIC REACTION TO BEE STING, UNDETERMINED INTENT, SEQUELA: ICD-10-CM

## 2022-12-08 DIAGNOSIS — T78.2XXS ANAPHYLACTIC REACTION TO BEE STING, UNDETERMINED INTENT, SEQUELA: ICD-10-CM

## 2022-12-08 DIAGNOSIS — Z00.00 ENCOUNTER FOR MEDICARE ANNUAL WELLNESS EXAM: Primary | ICD-10-CM

## 2022-12-08 DIAGNOSIS — E03.9 HYPOTHYROIDISM, UNSPECIFIED TYPE: Chronic | ICD-10-CM

## 2022-12-08 DIAGNOSIS — F33.41 RECURRENT MAJOR DEPRESSIVE DISORDER, IN PARTIAL REMISSION (H): Chronic | ICD-10-CM

## 2022-12-08 DIAGNOSIS — I12.9 BENIGN HYPERTENSION WITH CKD (CHRONIC KIDNEY DISEASE) STAGE III (H): ICD-10-CM

## 2022-12-08 DIAGNOSIS — Z79.4 TYPE 2 DIABETES MELLITUS WITH STAGE 3B CHRONIC KIDNEY DISEASE, WITH LONG-TERM CURRENT USE OF INSULIN (H): ICD-10-CM

## 2022-12-08 DIAGNOSIS — M53.3 PAIN IN THE COCCYX: ICD-10-CM

## 2022-12-08 DIAGNOSIS — E11.22 TYPE 2 DIABETES MELLITUS WITH STAGE 3B CHRONIC KIDNEY DISEASE, WITH LONG-TERM CURRENT USE OF INSULIN (H): ICD-10-CM

## 2022-12-08 DIAGNOSIS — N18.30 BENIGN HYPERTENSION WITH CKD (CHRONIC KIDNEY DISEASE) STAGE III (H): ICD-10-CM

## 2022-12-08 DIAGNOSIS — N18.32 TYPE 2 DIABETES MELLITUS WITH STAGE 3B CHRONIC KIDNEY DISEASE, WITH LONG-TERM CURRENT USE OF INSULIN (H): ICD-10-CM

## 2022-12-08 PROCEDURE — G0439 PPPS, SUBSEQ VISIT: HCPCS | Mod: 95 | Performed by: NURSE PRACTITIONER

## 2022-12-08 PROCEDURE — 99215 OFFICE O/P EST HI 40 MIN: CPT | Mod: 95 | Performed by: NURSE PRACTITIONER

## 2022-12-08 RX ORDER — FLASH GLUCOSE SENSOR
KIT MISCELLANEOUS
Qty: 6 EACH | Refills: 4 | Status: SHIPPED | OUTPATIENT
Start: 2022-12-08 | End: 2024-01-01

## 2022-12-08 RX ORDER — EPINEPHRINE 0.3 MG/.3ML
0.3 INJECTION SUBCUTANEOUS PRN
Qty: 2 EACH | Refills: 1 | Status: SHIPPED | OUTPATIENT
Start: 2022-12-08

## 2022-12-08 ASSESSMENT — ENCOUNTER SYMPTOMS
NERVOUS/ANXIOUS: 1
SORE THROAT: 0
EYE PAIN: 1
FEVER: 0
PARESTHESIAS: 0
PALPITATIONS: 0
DIARRHEA: 0
ABDOMINAL PAIN: 0
WEAKNESS: 1
JOINT SWELLING: 1
HEMATURIA: 0
NAUSEA: 0
DYSURIA: 1
HEMATOCHEZIA: 0
FREQUENCY: 0
ARTHRALGIAS: 1
CHILLS: 0
DIZZINESS: 0
BREAST MASS: 1
CONSTIPATION: 1
HEADACHES: 1
SHORTNESS OF BREATH: 1
MYALGIAS: 1
COUGH: 1

## 2022-12-08 ASSESSMENT — ACTIVITIES OF DAILY LIVING (ADL)
CURRENT_FUNCTION: LAUNDRY REQUIRES ASSISTANCE
CURRENT_FUNCTION: TELEPHONE REQUIRES ASSISTANCE
CURRENT_FUNCTION: BATHING REQUIRES ASSISTANCE
CURRENT_FUNCTION: MONEY MANAGEMENT REQUIRES ASSISTANCE
CURRENT_FUNCTION: HOUSEWORK REQUIRES ASSISTANCE
CURRENT_FUNCTION: PREPARING MEALS REQUIRES ASSISTANCE
CURRENT_FUNCTION: TRANSPORTATION REQUIRES ASSISTANCE
CURRENT_FUNCTION: SHOPPING REQUIRES ASSISTANCE
CURRENT_FUNCTION: MEDICATION ADMINISTRATION REQUIRES ASSISTANCE

## 2022-12-08 ASSESSMENT — PATIENT HEALTH QUESTIONNAIRE - PHQ9
SUM OF ALL RESPONSES TO PHQ QUESTIONS 1-9: 12
SUM OF ALL RESPONSES TO PHQ QUESTIONS 1-9: 12
10. IF YOU CHECKED OFF ANY PROBLEMS, HOW DIFFICULT HAVE THESE PROBLEMS MADE IT FOR YOU TO DO YOUR WORK, TAKE CARE OF THINGS AT HOME, OR GET ALONG WITH OTHER PEOPLE: SOMEWHAT DIFFICULT

## 2022-12-08 NOTE — PROGRESS NOTES
Mariel is a 77 year old who is being evaluated via a billable video visit.      How would you like to obtain your AVS? MyChart  If the video visit is dropped, the invitation should be resent by: Send to e-mail at: [email or web address]  Will anyone else be joining your video visit? No          Assessment & Plan     Encounter for Medicare annual wellness exam  Reviewed medical/social/family history and health maintenance  - PRIMARY CARE FOLLOW-UP SCHEDULING; Future    Polypharmacy  Appreciate MTM input and I agree that Mariel is on several medications that may no longer benefit her.  Today we discussed discontinuing niacin, folic acid, mucinex, and cymbalta.  We should continue to evaluate her medications over time with her history of falls and cognitive decline.        Hypertensive chronic kidney disease stage III (H)  Appears to have been recently elevated at her last visit, but in general has been fairly well controlled.  Suspect a pain component.  With history of falls would not recommend aggressive parameters.  Continue losartan    Type 2 diabetes mellitus with stage 3b chronic kidney disease, with long-term current use of insulin (H)  Stable overall.  Continue Prandin and lantus.   - Continuous Blood Gluc Sensor (FREESTYLE MARTY 14 DAY SENSOR) [facility]; PLACE NEW SENSOR TO BACK OF ARM EVERY 14 DAYS TO MONITOR BLOOD SUGARS    Recurrent major depressive disorder, in partial remission (H)  Chronic pain syndrome  Pain in the coccyx  Mariel has elected to discontinue her Cymbalta and I am in agreement with this plan given her renal function.  She does not feel the medication helped her chronic pain and there are safer alternatives.  At this time, she does not want to start a new medication and would like to see how things go.  Mood is depressed, but this is also related to pain.  We discussed we have limited options to treat her pain given her comorbidities and high fall risk.  We discussed a strategy for weaning down  "her cymbalta and also discussed scheduling her tylenol 3-4 times daily to be more proactive about her pain.  Also recommended lidocaine patches as her coccyx is the most bothersome.  She also has some OTC voltaren gel she would like to try.  If mood or anxiety worsens, could consider mirtazapine.    Hypothyroidism, unspecified type  - TSH with free T4 reflex; Future    Stenosis of carotid artery, unspecified laterality  We discussed that she likely would not be candidate for major surgery, but we can certainly check status of carotid stenosis.  It is possible that perfusion is contributing to cognitive decline, but I suspect it is more multifactorial and her MRI suggests more chronic small vessel disease.  Discussed statin and ASA are appropriate to continue.  - US Carotid Bilateral; Future    Anaphylactic reaction to bee sting, undetermined intent, sequela  - EPINEPHrine (ANY BX GENERIC EQUIV) 0.3 MG/0.3ML injection 2-pack; Inject 0.3 mLs (0.3 mg) into the muscle as needed for anaphylaxis May repeat one time in 5-15 minutes if response to initial dose is inadequate.      I spent a total of 60 minutes on the day of the visit.   Time spent doing chart review, history and exam, documentation and further activities per the note       BMI:   Estimated body mass index is 48.55 kg/m  as calculated from the following:    Height as of 9/13/22: 1.702 m (5' 7\").    Weight as of 12/6/22: 140.6 kg (310 lb).   Weight management plan: Discussed healthy diet and exercise guidelines        No follow-ups on file.   Follow-up Visit   Expected date:  Dec 15, 2023 (Approximate)      Follow Up Appointment Details:     Follow-up with whom?: PCP    Follow-Up for what?: Medicare Wellness    Welcome or Annual?: Annual Wellness    How?: In Person or Video                    CHANTAL Grace CNP  [facility]    Breana Floyd is a 77 year old accompanied by her friend, presenting for the following health " "issues:  Annual Visit and Follow Up      Healthy Habits:     In general, how would you rate your overall health?  Poor    Frequency of exercise:  1 day/week    Duration of exercise:  Less than 15 minutes    Do you usually eat at least 4 servings of fruit and vegetables a day, include whole grains    & fiber and avoid regularly eating high fat or \"junk\" foods?  Yes    Taking medications regularly:  Yes    Medication side effects:  Other    Ability to successfully perform activities of daily living:  Telephone requires assistance, transportation requires assistance, shopping requires assistance, preparing meals requires assistance, housework requires assistance, bathing requires assistance, laundry requires assistance, medication administration requires assistance and money management requires assistance    Home Safety:  No safety concerns identified    Hearing Impairment:  No hearing concerns    In the past 6 months, have you been bothered by leaking of urine? Yes    In general, how would you rate your overall mental or emotional health?  Fair      PHQ-2 Total Score: 2    Additional concerns today:  Yes       Annual Wellness Visit    Patient has been advised of split billing requirements and indicates understanding: Yes     Are you in the first 12 months of your Medicare Part B coverage?  No    Physical Health:    There were no vitals taken for this visit.  Weight: Unable to obtain due to video visit  Height: Unable to obtain due to video visit  BMI: Unable to obtain due to video visit  Blood Pressure: Unable to obtain due to video visit  Answers for HPI/ROS submitted by the patient on 12/8/2022  If you checked off any problems, how difficult have these problems made it for you to do your work, take care of things at home, or get along with other people?: Somewhat difficult  PHQ9 TOTAL SCORE: 12      Mental Health:    In general, how would you rate your overall mental or emotional health? poor  PHQ-2 Score: 2    Do " you feel safe in your environment? Yes    Have you ever done Advance Care Planning? (For example, a Health Directive, POLST, or a discussion with a medical provider or your loved ones about your wishes)? Yes, advance care planning is on file.    Fall risk:  Fallen 2 or more times in the past year?: Yes  Any fall with injury in the past year?: Yes    Cognitive Screenin) Repeat 3 items (Leader, Season, Table)    2) Clock draw: ABNORMAL known memory deficit  3) 3 item recall: Recalls NO objects   Results: 0 items recalled: PROBABLE COGNITIVE IMPAIRMENT, **INFORM PROVIDER**    Mini-CogTM Copyright [employer]. Licensed by the author for use in [facility]; reprinted with permission (soob@[facility]). All rights reserved.      Do you have sleep apnea, excessive snoring or daytime drowsiness?: yes    Current providers sharing in care for this patient include:   Patient Care Team:  Malika Wolf APRN CNP as PCP - General (Nurse Practitioner Primary Care)  Zeeshan Hutson MD as MD (Orthopedics)  Jefry Hanson DPM (Podiatry)  Tom Cruz MD as MD (Neurology)  Rosina Kohler MD as MD (Family Medicine - Sports Medicine)  Jeffrey Roberson MD as MD (General Surgery)  Stephanie Wolf MD as MD (Cardiology)  Cathi Vaughn APRN CNP as Nurse Practitioner (Cardiology)  Rosina Mays, RN as Nurse Coordinator (Cardiology)  Shayna Newell, RN as Specialty Care Coordinator (Cardiology)  Magdalena Hall PA-C as Physician Assistant (Cardiology)  Lesly Cobos, JODY as Specialty Care Coordinator (Breast Oncology)  Opal Weber MD as MD (Breast Oncology)  Opal Weber MD as Assigned Cancer Care Provider  Dahlia Wolfe PA-C as Physician Assistant (Urology)  Radha Llanos, RN as Diabetes Educator (Diabetes Education)  Muriel Will PA-C as Physician Assistant (Endocrinology, Diabetes, and Metabolism)  Muriel Will PA-C  as Assigned Endocrinology Provider  Dahlia Wolfe PA-C as Assigned OBGYN Provider  Malika Wolf APRN CNP as Assigned PCP  Shreyas Cole, PhD as Assigned Behavioral Health Provider  Paula Hdz APRN CNP as Nurse Practitioner (Neurology)  Jefry Ramos MD as Assigned Surgical Provider  Bharat Fitzgerald DPM as Assigned Musculoskeletal Provider  Allison Prather MD as Assigned Pulmonology Provider  Daniella Salazar RN as Lead Care Coordinator  Gwendolyn gonzalez PA-C as Assigned Nephrology Provider  Ronny Raines MD as Assigned Palliative Care Provider  Magdalena Hall PA-C as Assigned Heart and Vascular Provider  Ko Denney DO as Assigned Neuroscience Provider  Aurora Sparrow as Pharmacist (Pharmacist)    Patient has been advised of split billing requirements and indicates understanding: Yes      Review of Systems   Constitutional: Negative for chills and fever.   HENT: Positive for congestion. Negative for ear pain and sore throat.    Eyes: Positive for pain and visual disturbance.   Respiratory: Positive for cough and shortness of breath.    Cardiovascular: Positive for peripheral edema. Negative for chest pain and palpitations.   Gastrointestinal: Positive for constipation. Negative for abdominal pain, diarrhea, hematochezia and nausea.   Breasts:  Positive for tenderness and breast mass. Negative for discharge.   Genitourinary: Positive for dysuria, genital sores and vaginal bleeding. Negative for frequency, hematuria, pelvic pain, urgency and vaginal discharge.   Musculoskeletal: Positive for arthralgias, joint swelling and myalgias.   Skin: Negative for rash.   Neurological: Positive for weakness and headaches. Negative for dizziness and paresthesias.   Psychiatric/Behavioral: Positive for mood changes. The patient is nervous/anxious.             Objective           Vitals:  No vitals were obtained today due to virtual  visit.    Physical Exam   GENERAL: alert, no distress and elderly  EYES: Eyes grossly normal to inspection.  No discharge or erythema, or obvious scleral/conjunctival abnormalities.  RESP: No audible wheeze, cough, or visible cyanosis.  No visible retractions or increased work of breathing.    SKIN: Visible skin clear. No significant rash, abnormal pigmentation or lesions.  NEURO: Cranial nerves grossly intact.  Mentation and speech appropriate for age.  PSYCH: inattentive and affect flat              Video-Visit Details    Video Start Time: 1513    Type of service:  Video Visit    Video End Time:1546    Originating Location (pt. Location): Home        Distant Location (provider location):  On-site    Platform used for Video Visit: Guy      The patient was provided with suggestions to help her develop a healthy physical lifestyle.  She is at risk for lack of exercise and has been provided with information to increase physical activity for the benefit of her well-being.  The patient reports that she has difficulty with activities of daily living. I have asked that the patient make a follow up appointment in 53 weeks where this issue will be further evaluated and addressed.  Information on urinary incontinence and treatment options given to patient.  The patient was provided with suggestions to help her develop a healthy emotional lifestyle.  The patient s PHQ-9 score is consistent with moderate depression. She was provided with information regarding depression and was advised to schedule a follow up appointment in 53 weeks to further address this issue.  She is at risk for falling and has been provided with information to reduce the risk of falling at home.

## 2022-12-08 NOTE — PROGRESS NOTES
"Mariel is a 77 year old who is being evaluated via a billable video visit.      How would you like to obtain your AVS? {AVS Preference:341798}  If the video visit is dropped, the invitation should be resent by: {video visit invitation (Optional) :574537}  Will anyone else be joining your video visit? {:570060}  {If patient encounters technical issues they should call [phone] :154127}        {PROVIDER CHARTING PREFERENCE:528623}    Subjective   Mariel is a 77 year old{ACCOMPANIED BY STATEMENT (Optional):594029}, presenting for the following health issues:  No chief complaint on file.      Healthy Habits:     In general, how would you rate your overall health?  Poor    Frequency of exercise:  1 day/week    Duration of exercise:  Less than 15 minutes    Do you usually eat at least 4 servings of fruit and vegetables a day, include whole grains    & fiber and avoid regularly eating high fat or \"junk\" foods?  Yes    Taking medications regularly:  Yes    Medication side effects:  Other    Ability to successfully perform activities of daily living:  Telephone requires assistance, transportation requires assistance, shopping requires assistance, preparing meals requires assistance, housework requires assistance, bathing requires assistance, laundry requires assistance, medication administration requires assistance and money management requires assistance    Home Safety:  No safety concerns identified    Hearing Impairment:  No hearing concerns    In the past 6 months, have you been bothered by leaking of urine? Yes    In general, how would you rate your overall mental or emotional health?  Fair      PHQ-2 Total Score: 2    Additional concerns today:  Yes       {SUPERLIST (Optional):246195}  {additonal problems for provider to add (Optional):896145}    Review of Systems   {ROS COMP (Optional):424788}      Objective           Vitals:  No vitals were obtained today due to virtual visit.    Physical Exam   {video visit exam " "brief selected:208806::\"GENERAL: Healthy, alert and no distress\",\"EYES: Eyes grossly normal to inspection.  No discharge or erythema, or obvious scleral/conjunctival abnormalities.\",\"RESP: No audible wheeze, cough, or visible cyanosis.  No visible retractions or increased work of breathing.  \",\"SKIN: Visible skin clear. No significant rash, abnormal pigmentation or lesions.\",\"NEURO: Cranial nerves grossly intact.  Mentation and speech appropriate for age.\",\"PSYCH: Mentation appears normal, affect normal/bright, judgement and insight intact, normal speech and appearance well-groomed.\"}    {Diagnostic Test Results (Optional):300876}    {AMBULATORY ATTESTATION (Optional):579605}        Video-Visit Details    Video Start Time: {video visit start/end time for provider to select:805826}    Type of service:  Video Visit    Video End Time:{video visit start/end time for provider to select:989054}    Originating Location (pt. Location): {video visit patient location:000485::\"Home\"}    {PROVIDER LOCATION On-site should be selected for visits conducted from your clinic location or adjoining [facility] hospital, academic office, or other nearby [facility] building. Off-site should be selected for all other provider locations, including home:807609}    Distant Location (provider location):  {virtual location provider:922790}    Platform used for Video Visit: {Virtual Visit Platforms:646427::\"AmWell\"}    "

## 2022-12-08 NOTE — PATIENT INSTRUCTIONS
To schedule with radiology, please call: [phone]       At your visit today, we discussed your risk for falls and preventive options.    Fall Prevention  Falls often occur due to slipping, tripping or losing your balance. Millions of people fall every year and injure themselves. Here are ways to reduce your risk of falling again.   1. Think about your fall, was there anything that caused your fall that can be fixed, removed, or replaced?  2. Make your home safe by keeping walkways clear of objects you may trip over, such as electric cords.  3. Use non-slip pads under rugs. Don't use area rugs or small throw rugs.  4. Use non-slip mats in bathtubs and showers.  5. Install handrails and lights on staircases. The handrails should be on both sides of the stairs.  6. Don't walk in poorly lit areas.  7. Don't stand on chairs or wobbly ladders.  8. Use caution when reaching overhead or looking upward. This position can cause a loss of balance.  9. Be sure your shoes fit properly, have non-slip bottoms and are in good condition.   10. Wear shoes both inside and out. Don't go barefoot or wear slippers.  11. Be cautious when going up and down stairs, curbs, and when walking on uneven sidewalks.  12. If your balance is poor, consider using a cane or walker.  13. If your fall was related to alcohol use, stop or limit alcohol intake.   14. If your fall was related to use of sleeping medicines, talk to your healthcare provider about this. You may need to reduce your dosage at bedtime if you awaken during the night to go to the bathroom.    15. To reduce the need for nighttime bathroom trips:  ? Don't drink fluids for several hours before going to bed  ? Empty your bladder before going to bed  ? Men can keep a urinal at the bedside  16. Stay as active as you can. Balance, flexibility, strength, and endurance all come from exercise. They all play a role in preventing falls. Ask your healthcare provider which types of activity  are right for you.  17. Get your vision checked on a regular basis.  18. If you have pets, know where they are before you stand up or walk so you don't trip over them.  19. Use night lights.  20. Go over all your medicines with a pharmacist or other healthcare provider to see if any of them could make you more likely to fall.  [employer] last reviewed this educational content on 4/1/2018 2000-2021 [employer]. All rights reserved. This information is not intended as a substitute for professional medical care. Always follow your healthcare professional's instructions.        Patient Education   Personalized Prevention Plan  You are due for the preventive services outlined below.  Your care team is available to assist you in scheduling these services.  If you have already completed any of these items, please share that information with your care team to update in your medical record.  Health Maintenance Due   Topic Date Due     Heart Failure Action Plan  11/30/2021     Eye Exam  06/10/2022     Kidney Microalbumin Urine Test  12/07/2022     Hemoglobin  03/07/2023     Your Health Risk Assessment indicates you feel you are not in good health    A healthy lifestyle helps keep the body fit and the mind alert. It helps protect you from disease, helps you fight disease, and helps prevent chronic disease (disease that doesn't go away) from getting worse. This is important as you get older and begin to notice twinges in muscles and joints and a decline in the strength and stamina you once took for granted. A healthy lifestyle includes good healthcare, good nutrition, weight control, recreation, and regular exercise. Avoid harmful substances and do what you can to keep safe. Another part of a healthy lifestyle is stay mentally active and socially involved.    Good healthcare     Have a wellness visit every year.     If you have new symptoms, let us know right away. Don't wait until the next checkup.     Take  medicines exactly as prescribed and keep your medicines in a safe place. Tell us if your medicine causes problems.   Healthy diet and weight control     Eat 3 or 4 small, nutritious, low-fat, high-fiber meals a day. Include a variety of fruits, vegetables, and whole-grain foods.     Make sure you get enough calcium in your diet. Calcium, vitamin D, and exercise help prevent osteoporosis (bone thinning).     If you live alone, try eating with others when you can. That way you get a good meal and have company while you eat it.     Try to keep a healthy weight. If you eat more calories than your body uses for energy, it will be stored as fat and you will gain weight.     Recreation   Recreation is not limited to sports and team events. It includes any activity that provides relaxation, interest, enjoyment, and exercise. Recreation provides an outlet for physical, mental, and social energy. It can give a sense of worth and achievement. It can help you stay healthy.    Mental Exercise and Social Involvement  Mental and emotional health is as important as physical health. Keep in touch with friends and family. Stay as active as possible. Continue to learn and challenge yourself.   Things you can do to stay mentally active are:    Learn something new, like a foreign language or musical instrument.     Play SCRABBLE or do crossword puzzles. If you cannot find people to play these games with you at home, you can play them with others on your computer through the Internet.     Join a games club--anything from card games to chess or checkers or lawn bowling.     Start a new hobby.     Go back to school.     Volunteer.     Read.   Keep up with world events.    Exercise for a Healthier Heart  You may wonder how you can improve the health of your heart. If you re thinking about exercise, you re on the right track. You don t need to become an athlete. But you do need a certain amount of brisk exercise to help strengthen your  heart. If you have been diagnosed with a heart condition, your healthcare provider may advise exercise to help stabilize your condition. To help make exercise a habit, choose safe, fun activities.      Exercise with a friend. When activity is fun, you're more likely to stick with it.   Before you start  Check with your healthcare provider before starting an exercise program. This is especially important if you have not been active for a while. It's also important if you have a long-term (chronic) health problem such as heart disease, diabetes, or obesity. Or if you are at high risk for having these problems.   Why exercise?  Exercising regularly offers many healthy rewards. It can help you do all of the following:     Improve your blood cholesterol level to help prevent further heart trouble    Lower your blood pressure to help prevent a stroke or heart attack    Control diabetes, or reduce your risk of getting this disease    Improve your heart and lung function    Reach and stay at a healthy weight    Make your muscles stronger so you can stay active    Prevent falls and fractures by slowing the loss of bone mass (osteoporosis)    Manage stress better    Reduce your blood pressure    Improve your sense of self and your body image  Exercise tips      Ease into your routine. Set small goals. Then build on them. If you are not sure what your activity level should be, talk with your healthcare provider first before starting an exercise routine.    Exercise on most days. Aim for a total of 150 minutes (2 hours and 30 minutes) or more of moderate-intensity aerobic activity each week. Or 75 minutes (1 hour and 15 minutes) or more of vigorous-intensity aerobic activity each week. Or try for a combination of both. Moderate activity means that you breathe heavier and your heart rate increases but you can still talk. Think about doing 40 minutes of moderate exercise, 3 to 4 times a week. For best results, activity should  last for about 40 minutes to lower blood pressure and cholesterol. It's OK to work up to the 40-minute period over time. Examples of moderate-intensity activity are walking 1 mile in 15 minutes. Or doing 30 to 45 minutes of yard work.    Step up your daily activity level.  Along with your exercise program, try being more active the whole day. Walk instead of drive. Or park further away so that you take more steps each day. Do more household tasks or yard work. You may not be able to meet the advised mount of physical activity. But doing some moderate- or vigorous-intensity aerobic activity can help reduce your risk for heart disease. Your healthcare provider can help you figure out what is best for you.    Choose 1 or more activities you enjoy.  Walking is one of the easiest things you can do. You can also try swimming, riding a bike, dancing, or taking an exercise class.    When to call your healthcare provider  Call your healthcare provider if you have any of these:     Chest pain or feel dizzy or lightheaded    Burning, tightness, pressure, or heaviness in your chest, neck, shoulders, back, or arms    Abnormal shortness of breath    More joint or muscle pain    A very fast or irregular heartbeat (palpitations)  [employer] last reviewed this educational content on 7/1/2019 2000-2021 [employer]. All rights reserved. This information is not intended as a substitute for professional medical care. Always follow your healthcare professional's instructions.        Activities of Daily Living    Your Health Risk Assessment indicates you have difficulties with activities of daily living such as housework, bathing, preparing meals, taking medication, etc. Please make a follow up appointment for us to address this issue in more detail.    Urinary Incontinence, Female (Adult)   Urinary incontinence means loss of bladder control. This problem affects many women, especially as they get older. If you have  incontinence, you may be embarrassed to ask for help. But know that this problem can be treated.   Types of Incontinence  There are different types of incontinence. Two of the main types are described here. You can have more than one type.     Stress incontinence. With this type, urine leaks when pressure (stress) is put on the bladder. This may happen when you cough, sneeze, or laugh. Stress incontinence most often occurs because the pelvic floor muscles that support the bladder and urethra are weak. This can happen after pregnancy and vaginal childbirth or a hysterectomy. It can also be due to excess body weight or hormone changes.    Urge incontinence (also called overactive bladder). With this type, a sudden urge to urinate is felt often. This may happen even though there may not be much urine in the bladder. The need to urinate often during the night is common. Urge incontinence most often occurs because of bladder spasms. This may be due to bladder irritation or infection. Damage to bladder nerves or pelvic muscles, constipation, and certain medicines can also lead to urge incontinence.  Treatment depends on the cause. Further evaluation is needed to find the type you have. This will likely include an exam and certain tests. Based on the results, you and your healthcare provider can then plan treatment. Until a diagnosis is made, the home care tips below can help ease symptoms.   Home care    Do pelvic floor muscle exercises, if they are prescribed. The pelvic floor muscles help support the bladder and urethra. Many women find that their symptoms improve when doing special exercises that strengthen these muscles. To do the exercises, contract the muscles you would use to stop your stream of urine. But do this when you re not urinating. Hold for 10 seconds, then relax. Repeat 10 to 20 times in a row, at least 3 times a day. Your healthcare provider may give you other instructions for how to do the exercises and  how often.    Keep a bladder diary. This helps track how often and how much you urinate over a set period of time. Bring this diary with you to your next visit with the provider. The information can help your provider learn more about your bladder problem.    Lose weight, if advised to by your provider. Extra weight puts pressure on the bladder. Your provider can help you create a weight-loss plan that s right for you. This may include exercising more and making certain diet changes.    Don't have foods and drinks that may irritate the bladder. These can include alcohol and caffeinated drinks.    Quit smoking. Smoking and other tobacco use can lead to a long-term (chronic) cough that strains the pelvic floor muscles. Smoking may also damage the bladder and urethra. Talk with your provider about treatments or methods you can use to quit smoking.    If drinking large amounts of fluid makes you have symptoms, you may be advised to limit your fluid intake. You may also be advised to drink most of your fluids during the day and to limit fluids at night.    If you re worried about urine leakage or accidents, you may wear absorbent pads to catch urine. Change the pads often. This helps reduce discomfort. It may also reduce the risk of skin or bladder infections.    Follow-up care  Follow up with your healthcare provider, or as directed. It may take some to find the right treatment for your problem. But healthy lifestyle changes can be made right away. These include such things as exercising on a regular basis, eating a healthy diet, losing weight (if needed), and quitting smoking. Your treatment plan may include special therapies or medicines. Certain procedures or surgery may also be options. Talk about any questions you have with your provider.   When to seek medical advice  Call the healthcare provider right away if any of these occur:    Fever of 100.4 F (38 C) or higher, or as directed by your provider    Bladder pain  or fullness    Belly swelling    Nausea or vomiting    Back pain    Weakness, dizziness, or fainting  [facility] last reviewed this educational content on 1/1/2020 2000-2021 [employer]. All rights reserved. This information is not intended as a substitute for professional medical care. Always follow your healthcare professional's instructions.        Your Health Risk Assessment indicates you feel you are not in good emotional health.    Recreation   Recreation is not limited to sports and team events. It includes any activity that provides relaxation, interest, enjoyment, and exercise. Recreation provides an outlet for physical, mental, and social energy. It can give a sense of worth and achievement. It can help you stay healthy.    Mental Exercise and Social Involvement  Mental and emotional health is as important as physical health. Keep in touch with friends and family. Stay as active as possible. Continue to learn and challenge yourself.   Things you can do to stay mentally active are:    Learn something new, like a foreign language or musical instrument.     Play SCRABBLE or do crossword puzzles. If you cannot find people to play these games with you at home, you can play them with others on your computer through the Internet.     Join a games club--anything from card games to chess or checkers or lawn bowling.     Start a new hobby.     Go back to school.     Volunteer.     Read.   Keep up with world events.    Depression and Suicide in Older Adults    Nearly 2 million older Americans have some type of depression. Some of them even take their own lives. Yet depression among older adults is often ignored. Learn the warning signs. You may help spare a loved one needless pain. You may also save a life.   What is depression?  Depression is a common and serious illness that affects the way you think and feel. It is not a normal part of aging, nor is it a sign of weakness, a character flaw, or  "something you can snap out of. Most people with depression need treatment to get better. The most common symptom is a feeling of deep sadness. People who are depressed also may seem tired and listless. And nothing seems to give them pleasure. It s normal to grieve or be sad sometimes. But sadness lessens or passes with time. Depression rarely goes away or improves on its own. A person with clinical depression can't \"snap out of it.\" Other symptoms of depression are:     Sleeping more or less than normal    Eating more or less than normal    Having headaches, stomachaches, or other pains that don t go away    Feeling nervous,  empty,  or worthless    Crying a great deal    Thinking or talking about suicide or death    Loss of interest in activities previously enjoyed    Social isolation    Feeling confused or forgetful  What causes it?  The causes of depression aren t fully known. But it is thought to result from a complex blend of these factors:     Biochemistry. Certain chemicals in the brain play a role.    Genes. Depression does run in families.    Life stress. Life stresses can also trigger depression in some people. Older adults often face many stressors, such as death of friends or a spouse, health problems, and financial concerns.    Chronic conditions. This includes conditions such as diabetes, heart disease, or cancer. These can cause symptoms of depression. Medicine side effects can cause changes in thoughts and behaviors.  How you can help  Often, depressed people may not want to ask for help. When they do, they may be ignored. Or, they may receive the wrong treatment. You can help by showing parents and older friends love and support. If they seem depressed, don t lecture the person, ignore the symptoms, or discount the symptoms as a  normal  part of aging -which they are not. Get involved, listen, and show interest and support.   Help them understand that depression is a treatable illness. Tell them you " can help them find the right treatment. Offer to go to their healthcare provider's appointment with them for support when the symptoms are discussed. With their approval, contact a local mental health center, social service agency, or hospital about services.   You can be an advocate for him or her at healthcare appointments. Many older adults have chronic illnesses that can cause symptoms of depression. Medicine side effects can change thoughts and behaviors. You can help make sure that the healthcare provider looks at all of these factors. He or she should refer your family member or friend to a mental healthcare provider when needed. in some cases, untreated depression can lead to a misdiagnosis. A person may be diagnosed with a brain disorder such as dementia. If the healthcare provider does not take the issue of depression seriously, help your family member or friend to find another provider.   Don't be afraid to ask  If you think an older person you care about could be suicidal, ask,  Have you thought about suicide?  Most people will tell you the truth. If they say  yes,  they may already have a plan for how and when they will attempt it. Find out as much as you can. The more detailed the plan, and the easier it is to carry out, the more danger the person is in right now. Tell the person you are there for them and do not want them to harm him or herself. Don't wait to get help for the person. Call the person's healthcare provider, local hospital, or emergency services.   To learn more    National Suicide Prevention Lifeline (crisis hotline) 093-682-FOSA (006-022-6111)    National [place] of Mental Aslwkd843-165-2989gpb.[facility].nih.gov    National [place] on Mental Memzcou720-916-5166cxy.beatriz.org    Mental Health Vfhzrew000-374-0373vjb.[facility].org    National Suicide Zzanhxn202-SCXMXMP (810-308-6042)    Call 253  Never leave the person alone. A person who is actively suicidal needs psychiatric care right away.  They will need constant supervision. Never leave the person out of sight. Call 911 or the national 24-hour suicide crisis hotline at 800-273-talk (750.815.4978). You can also take the person to the closest emergency room.   [employer] last reviewed this educational content on 5/1/2020 2000-2021 [employer]. All rights reserved. This information is not intended as a substitute for professional medical care. Always follow your healthcare professional's instructions.          Preventing Falls at Home  A person can fall for many reasons. Older adults may fall because reaction time slows down as we age. Your muscles and joints may get stiff, weak, or less flexible because of illness, medicines, or a physical condition.   Other health problems that make falls more likely include:     Arthritis    Dizziness or lightheadedness when you stand up (orthostatic hypotension)    History of a stroke    Dizziness    Anemia    Certain medicines taken for mental illness or to control blood pressure.    Problems with balance or gait    Bladder or urinary problems    History of falling    Changes in vision (vision impairment)    Changes in thinking skills and memory (cognitive impairment)  Injuries from a fall can include serious injuries such as broken bones, dislocated joints, internal bleeding and cuts. Injuries like these can limit your independence.   Prevention tips  To help prevent falls and fall-related injuries, follow the tips below.    Floors  To make floors safer:     Put nonskid pads under area rugs.    Remove small rugs.    Replace worn floor coverings.    Tack carpets firmly to each step on carpeted stairs. Put nonskid strips on the edges of uncarpeted stairs.    Keep floors and stairs free of clutter and cords.    Arrange furniture so there are clear pathways.    Clean up any spills right away.  Bathrooms    To make bathrooms safer:     Install grab bars in the tub or shower.    Apply nonskid strips or  put a nonskid rubber mat in the tub or shower.    Sit on a bath chair to bathe.    Use bathmats with nonskid backing.  Lighting  To improve visibility in your home:      Keep a flashlight in each room. Or put a lamp next to the bed within easy reach.    Put nightlights in the bedrooms, hallways, kitchen, and bathrooms.    Make sure all stairways have good lighting.    Take your time when going up and down stairs.    Put handrails on both sides of stairs and in walkways for more support. To prevent injury to your wrist or arm, don t use handrails to pull yourself up.    Install grab bars to pull yourself up.    Move or rearrange items that you use often. This will make them easier to find or reach.    Look at your home to find any safety hazards. Especially look at doorways, walkways, and the driveway. Remove or repair any safety problems that you find.  Other changes to make    Look around to find any safety hazards. Look closely at doorways, walkways, and the driveway. Remove or repair any safety problems that you find.    Wear shoes that fit well.    Take your time when going up and down stairs.    Put handrails on both sides of stairs and in walkways for more support. To prevent injury to your wrist or arm, don t use handrails to pull yourself up.    Install grab bars wherever needed to pull yourself up.    Arrange items that you use often. This will make them easier to find or reach.    [employer] last reviewed this educational content on 3/1/2020    6183-6510 [employer]. All rights reserved. This information is not intended as a substitute for professional medical care. Always follow your healthcare professional's instructions.

## 2022-12-09 ENCOUNTER — TELEPHONE (OUTPATIENT)
Dept: NEPHROLOGY | Facility: CLINIC | Age: 77
End: 2022-12-09

## 2022-12-12 ENCOUNTER — TELEPHONE (OUTPATIENT)
Dept: ENDOCRINOLOGY | Facility: CLINIC | Age: 77
End: 2022-12-12

## 2022-12-13 ENCOUNTER — VIRTUAL VISIT (OUTPATIENT)
Dept: ENDOCRINOLOGY | Facility: CLINIC | Age: 77
End: 2022-12-13
Payer: MEDICARE

## 2022-12-13 DIAGNOSIS — E89.0 POSTOPERATIVE HYPOTHYROIDISM: ICD-10-CM

## 2022-12-13 DIAGNOSIS — R41.3 MEMORY LOSS: ICD-10-CM

## 2022-12-13 DIAGNOSIS — E66.01 MORBID OBESITY (H): Chronic | ICD-10-CM

## 2022-12-13 DIAGNOSIS — N18.30 TYPE 2 DIABETES MELLITUS WITH STAGE 3 CHRONIC KIDNEY DISEASE, WITH LONG-TERM CURRENT USE OF INSULIN, UNSPECIFIED WHETHER STAGE 3A OR 3B CKD (H): Primary | ICD-10-CM

## 2022-12-13 DIAGNOSIS — E11.22 TYPE 2 DIABETES MELLITUS WITH STAGE 3 CHRONIC KIDNEY DISEASE, WITH LONG-TERM CURRENT USE OF INSULIN, UNSPECIFIED WHETHER STAGE 3A OR 3B CKD (H): Primary | ICD-10-CM

## 2022-12-13 DIAGNOSIS — Z79.4 TYPE 2 DIABETES MELLITUS WITH STAGE 3 CHRONIC KIDNEY DISEASE, WITH LONG-TERM CURRENT USE OF INSULIN, UNSPECIFIED WHETHER STAGE 3A OR 3B CKD (H): Primary | ICD-10-CM

## 2022-12-13 PROCEDURE — 99214 OFFICE O/P EST MOD 30 MIN: CPT | Mod: 95 | Performed by: PHYSICIAN ASSISTANT

## 2022-12-13 RX ORDER — REPAGLINIDE 1 MG/1
1 TABLET ORAL
Qty: 180 TABLET | Refills: 3 | Status: ON HOLD | OUTPATIENT
Start: 2022-12-13 | End: 2023-09-11

## 2022-12-13 NOTE — LETTER
12/13/2022       RE: Mariel Ribeiro  [address] [place] [address]     Dear Colleague,    Thank you for referring your patient, Mariel Ribeiro, to the [facility] [place] at [facility]. Please see a copy of my visit note below.      Video: 1:50 - 2:09    [facility]  Endocrinology  Muriel Will PA-C  12/13/2022      Chief Complaint:   Diabetes    History of Present Illness:   Mariel Ribeiro is a 75 year old female with a history of type II diabetes mellitus, kidney failure, TIA, CHF, CAD status post CABG, COPD, thyroid cancer status post thyroidectomy and resultant hypothyroidism, and hypertension who presents for  follow up of her diabetes.     She was diagnosed with type 2 diabetes mellitus in 2007. Initially managed with oral Metformin, Januvia, and Glimepiride, all have been discontinued due to declining renal fucntion. She was reasonably well managed with NPH 28 units bid and Prandin with meals when seen 10/31/2019.  I saw her as a hospital follow-up in December 2019 with BG above goal and advised increasing Lantus to 31 and if needed 33 units daily and continuing prandin with meals and advised she see CDE.    Other recent visit notes:    Feb 2021: Met with Mariel and roommate Odalis.   Newer eye glasses not working, concerns about cognition and referred for neurology.  Concerned about renal disease, BG at goal.  Discussed starting Empagliflozin in place of Prandin but did not.      Feb 2022: BG at goal with Lantus 32 u, bid Prandin with meals, and careful diet but with 4% hypoglycemia.  Advised to decrease her Lantus to 29 units daily and take her Lantus at the same time daily.    August 2022: Mariel just returned home from [facility] after hospitalization due to a fall and then rehab.  Her blood sugar was at goal without hypoglycemia and advised to continue Lantus 29 units with 1 mg of her Repaglinide with each  meal.      Today:  With Odalis today. They note have not been checking sensor as often. Still eating breakfast around noon and then an evening meal.    She is drinking a lot of water, she just has always liked this.  She sleeps through the night most times, may be up at 5 or 6 am, but generally not sooner - sleeps pretty well.  She was having some incontinence issues so able to get through night without getting up to urinate most nights.  She denies symptoms of high or low blood sugars since we last met.  She is seeing Dr. Fitzgerald and having her nails trimmed regularly.  She has any changes in her vision or lesions or sores on her feet.  They have rescheduled her eye exam high on her list -she missed her last month due to hospitalization.    Current DM therapy:  Lantus 29 units daily - giving around 1 pm.    Prandin/ repaglinide:  1 mg twice daily with each of her meals.   -They reliably take with breakfast, but less frequently with dinner.        Blood Glucose Monitoring:              Diabetes monitoring and complications:  CAD: Yes  Last eye exam results:Still need to reschedule (was while hospitalized.)   Microalbuminuria: 31.53 (5/22/2019) - followed by nephrology.   Neuropathy: Yes  HTN: Yes  On Statin: Yes  On Aspirin: Yes  Depression: Yes  Erectile dysfunction: N/A      PMH: reviewed - any significant changes noted above.  Meds: Reviewed and updated in module and above.  Allergies: Reviewed and where applicable updated in module.  SH: Pertinent items reviewed with patient and changes noted above.  FH: Pertinent reviewed with patient and noted above.    ROS: Brief review of symptoms reveals stable condition except where noted above.         Social History:   She lives with her roommate Odalis and her dog Rimma. Odalis is an  and still working form home.  They eat between 8 and 10 pm.    Tobacco Use: former smoker, 27 years, quit 1989  Alcohol Use: none  Drug Use: none  PCP: Malika Wolf      Physical  "Exam:   There were no vitals taken for this visit.     Wt Readings from Last 10 Encounters:   12/06/22 140.6 kg (310 lb)   10/19/22 136.5 kg (301 lb)   10/12/22 136.8 kg (301 lb 9.6 oz)   09/13/22 (!) 158.8 kg (350 lb)   08/02/22 142.6 kg (314 lb 6.4 oz)   07/30/22 144.3 kg (318 lb 3.2 oz)   07/27/22 136.1 kg (300 lb)   07/27/22 142.4 kg (314 lb)   07/13/22 137.4 kg (303 lb)   04/20/22 144.7 kg (319 lb)      General: Pleasant, well nourished and hydrated obese female seated in her home in NAD.  Accompanied by Odalis.  Psych:  Mood is \"good,\" affect is appropriate.  Many questions are answered by Odalis, but Mariel also responds to a couple.  No display of insight or recollection observed.  HEENT: Eyes and sclera are clear. Extraocular movements are grossly intact without proptosis.  Nares are patent, mucous membranes moist.  Neck: No masses or JVD are noted.    Resp: Easy and unlabored breathing.   Neuro: Alert and oriented.  Voice is clear and articulate.  Ext: no swelling or edema     Data:  Lab Results   Component Value Date    GFRESTIMATED 37 (L) 10/19/2022    GFRESTIMATED 37 (L) 09/07/2022    GFRESTIMATED 48 (L) 07/16/2022    GFRESTBLACK 46 (L) 07/09/2021    GFRESTBLACK 39 (L) 04/29/2021    GFRESTBLACK 44 (L) 03/18/2021      Recent Labs   Lab Test 10/19/22  0751 09/07/22  1705 07/14/22  1537 07/13/22  1642 07/13/22  0619 07/12/22  1423 03/09/22  1304 01/20/22  1500 07/09/21  1700 04/29/21  1116 03/18/21  1423 03/02/21  1633 11/06/19  1233 10/31/19  0000 10/30/19  1537 10/01/19  1133 04/09/19 2050 04/03/19  0000   A1C  --   --   --   --   --  7.2* 6.8*  --    < > 6.8*  --   --    < >  --   --   --    < >  --    HEMOGLOBINA1  --   --   --   --   --   --   --   --   --   --   --   --   --  7.2*  --   --   --  6.5*   TSH  --   --   --  1.98  --   --  3.56  --   --  7.12*  --   --   --   --   --  5.02*  --   --    T4  --   --   --   --   --   --   --   --   --  0.87  --   --   --   --   --  0.97  --   --    *  " --   --   --   --   --  89  --   --  117*  --   --   --   --   --   --   --   --    HDL 54  --   --   --   --   --  57  --   --  57  --   --   --   --   --   --   --   --    TRIG 149  --   --   --   --   --  131  --   --  191*  --   --   --   --   --   --   --   --    CR 1.45* 1.45*   < >  --    < > 1.25* 1.37*  --    < > 1.51*   < >  --    < >  --    < > 1.59*   < >  --    MICROL  --   --   --   --   --   --   --  326  --   --   --  39  --   --   --   --    < >  --     < > = values in this interval not displayed.         Assessment and Plan:  Type 2 diabetes mellitus with stage 3 chronic kidney disease, with long-term current use of insulin (H)    Mariel is a 77 year old female with type II diabetes complicated by TIA, CHF, CAD status post CABG, COPD, thyroid cancer status post thyroidectomy and resultant hypothyroidism, obesity, depression, chronic pain and hypertension.    Mariel continues to live at home with the support of Odalis.  Her blood sugar appears to be managed at goal without any hypoglycemia.      Plan:  Please continue to take Lantus 29 units once daily and Repaglinide with each of your meals.  Please update your A1c tomorrow when you visit lab.  If it is greater than 8, it is very important that you take repaglinide with your evening meal as well.    Also if the A1c is 8 I will want you to repeat it again in 3 months after being more diligent with the Repaglinide.  I also would ask that you try to scan the blood sugar upon awakening or at breakfast more frequently prior to your next visit if A1c is greater than 8.    TSH will also be updated.      HTN/CKD:  She will continue to follow-up with nephrology.        DM Complications-   Retinopathy:   They will reschedule IN visit .  nephropathy:  Yes, she will continue to see Dr. Medina.  Neuropathy: Yes peripheral neuropathy.  She sees Dr. Fitzgerald in podiatry   feet: She sees Dr. Fitzgerald in podiatry   Lipids: Has CAD and is taking a statin.      Hypothyroidism: Her TSH was within normal range July 2022.  She will continue to take her current dose of levothyroxine.  We will update her dose again next summer.     Follow-up: Return in 4 months   34 minutes in preparation for visit reviewing chart, labs and documentation, visiting with patient gathering history and in exam, education and counseling, as well as coordination of care, further chart review and documentation following visit on this date of service and as alluded to documented above.  Extended time and education regarding concerns with hypoglycemia.      It is my privilege to be involved in the care of the above patient.     Muriel Will PA-C, MPAS  [facility]  Diabetes, Endocrinology, and Metabolism  [phone] Appointments/Nurse  [phone] Fax  [phone] pager  [phone] nurse line

## 2022-12-13 NOTE — NURSING NOTE
Patient denies any changes since echeck-in regarding medication and allergies and states all information entered during echeck-in remains accurate.    Rea Berrios MA

## 2022-12-13 NOTE — PATIENT INSTRUCTIONS
Dear Mariel and Odalis,  It is always nice to see you!  It appears that your blood sugar continues to be very well managed.  Please continue to take Lantus 29 units once daily and Repaglinide with each of your meals.  Please update your A1c tomorrow when you visit lab.  If it is greater than 8, it is very important that you take repaglinide with your evening meal as well.    Also if the A1c is 8 I will want you to repeat it again in 3 months after being more diligent with the Repaglinide.  I also would ask that you try to scan the blood sugar upon awakening or at breakfast more frequently prior to your next visit if A1c is greater than 8.    Otherwise please enjoy the holiday season and I wish you good health in the new year and look forward to seeing you in 6 months, though as always please reach out sooner if there are any concerns.    My best wishes,    Muriel Will PA-C, MPAS  [facility]  Diabetes, Endocrinology, and Metabolism  [phone] Appointments/Nurse  [phone] Fax  [phone] URGENTafter hours/weekend Endocrinologist on call

## 2022-12-16 ENCOUNTER — PATIENT OUTREACH (OUTPATIENT)
Dept: CARE COORDINATION | Facility: CLINIC | Age: 77
End: 2022-12-16

## 2022-12-16 NOTE — PROGRESS NOTES
Clinic Care Coordination Contact  [facility]/Voicemail       Clinical Data: Care Coordinator Outreach  Outreach attempted x 1.  Left message on patient's voicemail with call back information and requested return call.  Plan: Care Coordinator will try to reach patient again in 1-2 business days.    Kayla Salazar RN, BSN, CPHN, CM  [facility] Ambulatory Care Management  [facility]  Phone: [phone]  Email: [email or web address]

## 2022-12-19 ENCOUNTER — LAB (OUTPATIENT)
Dept: LAB | Facility: CLINIC | Age: 77
End: 2022-12-19
Payer: MEDICARE

## 2022-12-19 ENCOUNTER — PATIENT OUTREACH (OUTPATIENT)
Dept: CARDIOLOGY | Facility: CLINIC | Age: 77
End: 2022-12-19

## 2022-12-19 ENCOUNTER — ANCILLARY PROCEDURE (OUTPATIENT)
Dept: ULTRASOUND IMAGING | Facility: CLINIC | Age: 77
End: 2022-12-19
Attending: NURSE PRACTITIONER
Payer: MEDICARE

## 2022-12-19 ENCOUNTER — NURSE TRIAGE (OUTPATIENT)
Dept: NURSING | Facility: CLINIC | Age: 77
End: 2022-12-19

## 2022-12-19 DIAGNOSIS — Z79.4 TYPE 2 DIABETES MELLITUS WITH STAGE 3 CHRONIC KIDNEY DISEASE, WITH LONG-TERM CURRENT USE OF INSULIN, UNSPECIFIED WHETHER STAGE 3A OR 3B CKD (H): ICD-10-CM

## 2022-12-19 DIAGNOSIS — E03.9 HYPOTHYROIDISM, UNSPECIFIED TYPE: Chronic | ICD-10-CM

## 2022-12-19 DIAGNOSIS — E11.22 TYPE 2 DIABETES MELLITUS WITH STAGE 3 CHRONIC KIDNEY DISEASE, WITH LONG-TERM CURRENT USE OF INSULIN, UNSPECIFIED WHETHER STAGE 3A OR 3B CKD (H): ICD-10-CM

## 2022-12-19 DIAGNOSIS — N18.30 TYPE 2 DIABETES MELLITUS WITH STAGE 3 CHRONIC KIDNEY DISEASE, WITH LONG-TERM CURRENT USE OF INSULIN, UNSPECIFIED WHETHER STAGE 3A OR 3B CKD (H): ICD-10-CM

## 2022-12-19 DIAGNOSIS — I65.29 STENOSIS OF CAROTID ARTERY, UNSPECIFIED LATERALITY: ICD-10-CM

## 2022-12-19 DIAGNOSIS — N18.32 CHRONIC KIDNEY DISEASE, STAGE 3B (H): ICD-10-CM

## 2022-12-19 DIAGNOSIS — E83.52 HYPERCALCEMIA: ICD-10-CM

## 2022-12-19 LAB
ALBUMIN SERPL BCG-MCNC: 4 G/DL (ref 3.5–5.2)
ANION GAP SERPL CALCULATED.3IONS-SCNC: 10 MMOL/L (ref 7–15)
BUN SERPL-MCNC: 31.8 MG/DL (ref 8–23)
CALCIUM SERPL-MCNC: 10.3 MG/DL (ref 8.8–10.2)
CHLORIDE SERPL-SCNC: 103 MMOL/L (ref 98–107)
CREAT SERPL-MCNC: 1.7 MG/DL (ref 0.51–0.95)
DEPRECATED HCO3 PLAS-SCNC: 30 MMOL/L (ref 22–29)
ERYTHROCYTE [DISTWIDTH] IN BLOOD BY AUTOMATED COUNT: 13 % (ref 10–15)
GFR SERPL CREATININE-BSD FRML MDRD: 31 ML/MIN/1.73M2
GLUCOSE SERPL-MCNC: 100 MG/DL (ref 70–99)
HBA1C MFR BLD: 7.5 %
HCT VFR BLD AUTO: 40.4 % (ref 35–47)
HGB BLD-MCNC: 13.1 G/DL (ref 11.7–15.7)
MCH RBC QN AUTO: 29 PG (ref 26.5–33)
MCHC RBC AUTO-ENTMCNC: 32.4 G/DL (ref 31.5–36.5)
MCV RBC AUTO: 90 FL (ref 78–100)
PHOSPHATE SERPL-MCNC: 3.6 MG/DL (ref 2.5–4.5)
PLATELET # BLD AUTO: 145 10E3/UL (ref 150–450)
POTASSIUM SERPL-SCNC: 4.1 MMOL/L (ref 3.4–5.3)
RBC # BLD AUTO: 4.51 10E6/UL (ref 3.8–5.2)
SODIUM SERPL-SCNC: 143 MMOL/L (ref 136–145)
TOTAL PROTEIN SERUM FOR ELP: 6 G/DL (ref 6.4–8.3)
TSH SERPL DL<=0.005 MIU/L-ACNC: 1.37 UIU/ML (ref 0.3–4.2)
WBC # BLD AUTO: 6.9 10E3/UL (ref 4–11)

## 2022-12-19 PROCEDURE — 84165 PROTEIN E-PHORESIS SERUM: CPT | Mod: TC | Performed by: PATHOLOGY

## 2022-12-19 PROCEDURE — 93880 EXTRACRANIAL BILAT STUDY: CPT | Mod: GC | Performed by: RADIOLOGY

## 2022-12-19 PROCEDURE — 36415 COLL VENOUS BLD VENIPUNCTURE: CPT | Performed by: PATHOLOGY

## 2022-12-19 PROCEDURE — 84155 ASSAY OF PROTEIN SERUM: CPT | Performed by: PHYSICIAN ASSISTANT

## 2022-12-19 PROCEDURE — 82043 UR ALBUMIN QUANTITATIVE: CPT | Performed by: PHYSICIAN ASSISTANT

## 2022-12-19 PROCEDURE — 85027 COMPLETE CBC AUTOMATED: CPT | Performed by: PATHOLOGY

## 2022-12-19 PROCEDURE — 86335 IMMUNFIX E-PHORSIS/URINE/CSF: CPT | Performed by: PATHOLOGY

## 2022-12-19 PROCEDURE — 84165 PROTEIN E-PHORESIS SERUM: CPT | Mod: 26

## 2022-12-19 PROCEDURE — 86335 IMMUNFIX E-PHORSIS/URINE/CSF: CPT | Mod: 26

## 2022-12-19 PROCEDURE — 80069 RENAL FUNCTION PANEL: CPT | Performed by: PATHOLOGY

## 2022-12-19 PROCEDURE — 83036 HEMOGLOBIN GLYCOSYLATED A1C: CPT | Performed by: PHYSICIAN ASSISTANT

## 2022-12-19 PROCEDURE — 84443 ASSAY THYROID STIM HORMONE: CPT | Performed by: PATHOLOGY

## 2022-12-19 NOTE — TELEPHONE ENCOUNTER
Called Mariel to discuss cardiomems usage. When seen in clinic in October, had discussed making a committed effort to using Cardiomems consistently otherwise deactivating it. Had [employer] reach out to help Mariel figure out a way to use her pillow to send readings while sitting up.   Still has not been sending readings consistently. I reviewed with Mariel and Odalis that they're not getting benefit out of using it once a week. Reviewed that we look for consistent trends and this is what helps us to catch a heart failure exacerbation, and we can't do this when only sending readings once per week.   Odalis stated that they really want to be able to get on a consistent schedule. Asked if I thought it is worth it and beneficial for her to continue to do. I stressed that I think it could be beneficial if she can use it consistently as it helps us to catch a heart failure exacerbation early.   Odalis felt like they have been overwhelmed with appointments the last 3 months and haven't been able to get a handle on it. They really want to give it one more try and get in a good schedule with using it in the new year. The three of us agreed that we would give it until the end of January. If no consistency by then, they are in agreement with giving it up.   Will touch base with them at the end of January.   Rosina Mays RN

## 2022-12-19 NOTE — TELEPHONE ENCOUNTER
Nurse Triage SBAR    Is this a 2nd Level Triage? NO    Situation: Patient calling with Right big toe infection.      Consent: not needed    Background: Pt is diabetic and noticed yesterday that her big toe looked red and pussy at her toenail,     Assessment:red and pussy 1/2 inch around her toenail, rest of toe not red or swollen  pt denies fever, is not able to get into see her podiatrist.     Protocol Recommended Disposition:   See PCP Within 24 Hours    Recommendation: Advised patient to make an appointment. Transferred to scheduling. . Reviewed concerning symptoms and when to call back.     Pt states that she does not want to be transferred to scheduling at this time as she will try and soak for 20 min per protocol advice and then follow up with a nurse at her clinic    Does the patient meet one of the following criteria for ADS visit consideration? No         Diane Bunch RN [facility] 12/19/2022 1:42 PM                                Reason for Disposition    Yellow pus seen in skin around toenail (cuticle area), or pus seen under toenail    Additional Information    Negative: Patient sounds very sick or weak to the triager    Negative: [1] Looks infected (spreading redness, red streak, pus) AND [2] fever    Negative: [1] Skin around the nail has become red AND [2] larger than 2 inches (5 cm)    Negative: Entire toe is red    Negative: Entire toe is swollen    Protocols used: TOENAIL - INGROWN-A-

## 2022-12-20 ENCOUNTER — PATIENT OUTREACH (OUTPATIENT)
Dept: CARE COORDINATION | Facility: CLINIC | Age: 77
End: 2022-12-20

## 2022-12-20 LAB
CREAT UR-MCNC: 38.5 MG/DL
MICROALBUMIN UR-MCNC: 85.5 MG/L
MICROALBUMIN/CREAT UR: 222.08 MG/G CR (ref 0–25)

## 2022-12-20 NOTE — LETTER
[facility]  Patient Centered Plan of Care  About Me:        Patient Name:  Mariel Ribeiro    YOB: 1945  Age:         77 year old   Destiney MRN:    [id number] Telephone Information:  Home Phone [phone]   Mobile [phone]       Address:  [address] [place] [address] Email address:  [email or web address]      Emergency Contact(s)    Name Relationship Lgl Grd Work Phone Home Phone Mobile Phone   1. KOSTAS PIERSON Roommate No [phone]   2. VITA ODOM Sister No  [phone]   3. ORTIZ,SAKSHI Friend No  [phone]           Primary language:  English     needed? No   [facility]:  [phone] op. 1  Other communication barriers:Glasses    Preferred Method of Communication:  Fabian  Current living arrangement: Other; I live in a private home (i live in a house with a roommate)    Mobility Status/ Medical Equipment: Independent w/Device    Health Maintenance  Health Maintenance Reviewed: Due/Overdue (Discussed with Vero (roommate))    My Access Plan  Medical Emergency 911   Primary Clinic Line [facility] - [phone]   24 Hour Appointment Line [phone] or  [phone]) (toll-free)   24 Hour Nurse Line [phone] (toll-free)   Preferred Urgent Care [facility], [phone]     Preferred Hospital [facility]  [phone]     Preferred Pharmacy [id number] - [place], [place] - [address] AT [facility] [address]     Behavioral Health Crisis Line The National Suicide Prevention Lifeline at 1-141.510.8837 or Text/Call 548       My Care Team Members  Patient Care Team       Relationship Specialty Notifications Start End    Malika Wolf, APRN CNP PCP - General Nurse Practitioner Primary Care  2/7/22     Phone: [phone] Fax:  [phone]         [address] [place] [address]    Zeeshan Hutson MD MD Orthopedics  9/8/14      XXX RETIRED  [place] [address]    Jefry Hanson DPM  Podiatry  9/8/14     Phone: [phone] Fax: [phone]         [address] [facility] [address]    Tom Cruz MD MD Neurology  5/12/15     Phone: [phone] Fax: [phone]         [address] [id number] [facility] [address]    Rosina Kohler MD MD Family Medicine - Sports Medicine  11/21/16     Phone: [phone] Fax: [phone]         [address] [facility] [address]    Jeffrey Roberson MD MD General Surgery  5/22/17     Phone: [phone] Fax: [phone]         [address] [place] [address] [address] [facility] [address]    Stephanie Wolf MD MD Cardiology  7/2/18     Phone: [phone] Fax: [phone]         [address] [place] [address] [address] [facility] [address]    Cathi Vaughn APRN CNP Nurse Practitioner Cardiology Admissions 8/24/18     [facility]    Phone: [phone] Pager: [phone] Fax: [phone]        [facility] [address]    Rosina Mays, RN Nurse Coordinator Cardiology Admissions 8/24/18     [facility]    Phone: [phone] Fax: [phone]        Shayna Newell, RN Specialty Care Coordinator Cardiology Admissions 3/13/19     Phone: [phone]         Magdalena Hall PA-C Physician Assistant Cardiology Admissions 12/10/19     [facility]    Phone: [phone] Fax: [phone]         [address] [facility] [address]    Lesly Cobos, RN Specialty Care Coordinator Breast Oncology Admissions 8/23/20     Phone: [phone] Pager: [phone]        Opal Weber MD MD Breast Oncology Admissions 8/23/20     Phone: [phone] Fax: [phone]         [facility] [address]    Opal Weber MD Assigned Cancer Care Provider   10/23/20     Phone: [phone]  Fax: [phone]         [facility] [place] [address]    aDhlia Wolfe PA-C Physician Assistant Urology  12/22/20     Phone: [phone] Fax: [phone] [address] [place] [address]    Radha Llanos, RN Diabetes Educator Diabetes Education  1/8/21     Phone: [phone] Fax: [phone]         [address] [address]    Muriel Will PA-C Physician Assistant Endocrinology, Diabetes, and Metabolism  2/10/21     Phone: [phone] Fax: [phone]         [address] [place] [address]    Muriel Will PA-C Assigned Endocrinology Provider   3/17/21     Phone: [phone] Fax: [phone]         [address] [place] [address]    Dahlia Wolfe PA-C Assigned OBGYN Provider   11/21/21     Phone: [phone] Fax: [phone] [address] [address] [facility] [address]    Malika Wolf APRN CNP Assigned PCP   3/27/22     Phone: [phone] Fax: [phone]         [address] [place] [address]    Shreyas Cole, PhD Assigned Behavioral Health Provider   4/17/22     Phone: [phone] Fax: [phone]         [address] [place] [address]    Paula Hdz APRN CNP Nurse Practitioner Neurology  6/7/22     Phone: [phone] Fax: [phone]         [address] [address]    Jefry Ramos MD Assigned Surgical Provider   6/25/22     Phone: [phone] Fax: [phone]         [address] [place] [address]    Bharat Fitzgerald DPM Assigned Musculoskeletal Provider   7/9/22     Phone: [phone] Fax: [phone]          [facility] [address]    Allison Prather MD Assigned Pulmonology Provider   7/30/22     Phone: [phone] Fax: [phone]         [address] [address]    Kayla Salazra, JODY Lead Care Coordinator  Admissions 8/29/22     Gwendolyn Mobley PAOfeC Assigned Nephrology  Provider   9/24/22     Phone: [phone] Fax: [phone]         [address] [facility] [address]    Ronny Raines MD Assigned Palliative Care Provider   9/28/22     Phone: [phone] Fax: [phone]         [address] [facility] [address] Palliative Care [facility] [address]    Magdalena Hall PA-C Assigned Heart and Vascular Provider   10/29/22     Phone: [phone] Fax: [phone]         [address] [facility] [address]    Ko Denney DO Assigned Neuroscience Provider   11/26/22     Phone: [phone] Fax: [phone]         [address] [facility] [address]    Aurora Sparrow Pharmacist Pharmacist  12/2/22     Phone: [phone] Fax: [phone]         [address] [place] [facility] [address] [address]            My Care Plans  Self Management and Treatment Plan  Care Plan    Care Plan: 1. Community Resources     Problem: Sufficient In-home support     Goal: In-home Support     Start Date: 9/20/2022 Expected End Date: 2/14/2023    This Visit's Progress: 50%    Priority: Medium    Note:     Barriers: Limited mobility, diagnoses of multiple, chronic, complex medical conditions; provider availability - wait time to complete appointments  Strengths: Motivated; agreeable to care coordination  Patient expressed understanding of goal: Yes  Action steps to achieve this goal:  1. I will review and research resources on home care services provided.   2. I will discuss, review, schedule and complete recommended overdue health maintenance with my primary care provider.   3. I will contact my care team with questions, concerns, support needs. I will use the clinic as a resource and I understand I can contact my clinic with 24/7 after hours services available. Care Coordinator will remain available as needed.     Annual assessment due: 9/2023              Long-Range Goal: Establish adequate home support  Completed 11/21/2022    Start Date: 8/18/2022 Expected End Date:  12/30/2022    Note:     Goal Statement: I will get set up with home care for RN services, physical and occupational therapy in the next 1 month.   Date goal set: 9/20/22  Measure of Success: Enrollment in home care  Barriers: Home Care will end then private pay PCA will be needed.   Strengths:Strong support system; engaged in care coordination.  Date to Achieve By:3/9/23  Patient expressed understanding of goal:yes    Patient has been discharged from home care. She plans to continue exercises ongoing.     Action steps to achieve this goal  1. I will accept home care services.   2. I will accept the help of Care Coordination to find a home care agency.                         Care Plan: Research into hospital bed     Problem: Research into hospital bed     Goal: Complete research into obtaining a hospital bed.  Completed 12/20/2022    Start Date: 9/20/2022 Expected End Date: 3/20/2023    Recent Progress: 80%    Note:     Goal Statement: I will research what type of hospital bed can be obtained through private pay.    Date goal set: 9/20/22  Measure of Success: Obtaining a hospital bed.  Barriers:Purchasing new bed & removing old bed  Strengths:Strong support system; agreeable to care coordination  Date to Achieve By:3/20/23  Patient expressed understanding of goal:yes    Action steps to achieve this goal  1. I will research the styles of hospital beds available.   2. I will accept the help of Care Coordination to find additional resources/information on hospital beds.   3. I will ask Pallative care at our meeting 9/22/22 for information on hospital beds available for purchase or provided through Pallative services.      Patient decided to not get hospital bed. She has bars around bed; will look into getting a mattress with firm sides, so she won't slide off.                          Action Plans on File:   Depression  Advance Care Plans/Directives Type:   Advanced Directive - On File    My Medical and Care  Information  Problem List   Patient Active Problem List   Diagnosis     Hypothyroidism     Diverticulitis of colon     Coronary atherosclerosis     Myalgia and myositis     Migraine     Chronic airway obstruction/ COPD     Mononeuritis     Obstructive sleep apnea     Vitamin D deficiency     Hypertension goal BP (blood pressure) < 130/80     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)     Hyperlipidemia with target LDL less than 70     Chronic diastolic heart failure (H)     Osteoarthritis     Morbid obesity (H)     Arthritis of knee     Transient cerebral ischemia     History of thyroid cancer     Cervicalgia     Fatty liver disease, nonalcoholic     Hepatomegaly     Angina at rest (H)     S/P CABG x 3     Type 2 diabetes mellitus with stage 3 chronic kidney disease, with long-term current use of insulin (H)     Lymphedema     Lower back pain     Bilateral carotid artery disease (H)     Spinal stenosis of lumbar region, unspecified whether neurogenic claudication present     Status post coronary angiogram     Hemoptysis     Intertrigo     Malignant neoplasm of lower-inner quadrant of left breast in female, estrogen receptor positive (H)     Pain in the coccyx     Facet arthropathy     CKD (chronic kidney disease) stage 4, GFR 15-29 ml/min (H)     Facet arthropathy, lumbar     Temporal pain     Elevated C-reactive protein     Hypertensive chronic kidney disease with stage 5 chronic kidney disease or end stage renal disease (H)     Generalized weakness     Recurrent falls      Current Medications and Allergies:    Current Outpatient Medications   Medication     acetaminophen (TYLENOL) 500 MG tablet     albuterol (PROAIR HFA/PROVENTIL HFA/VENTOLIN HFA) 108 (90 Base) MCG/ACT inhaler     anastrozole (ARIMIDEX) 1 MG tablet     aspirin (ASA) 81 MG EC tablet     atorvastatin (LIPITOR) 40 MG tablet     bumetanide (BUMEX) 1 MG tablet     Continuous Blood Gluc  (FREESTYLE MARTY 14 DAY READER) JEZ     Continuous Blood  "Gluc Sensor (FREESTYLE MARTY 14 DAY SENSOR) MISC     DULoxetine (CYMBALTA) 30 MG capsule     EPINEPHrine (ANY BX GENERIC EQUIV) 0.3 MG/0.3ML injection 2-pack     EPINEPHrine (ANY BX GENERIC EQUIV) 0.3 MG/0.3ML injection 2-pack     ferrous gluconate (FERGON) 324 (38 Fe) MG tablet     insulin glargine (LANTUS SOLOSTAR) 100 UNIT/ML pen     levothyroxine (SYNTHROID) 150 MCG tablet     losartan (COZAAR) 25 MG tablet     metoprolol succinate ER (TOPROL-XL) 25 MG 24 hr tablet     miconazole (MICATIN/MICRO GUARD) 2 % external powder     nitroGLYcerin (NITROSTAT) 0.4 MG sublingual tablet     nystatin (MYCOSTATIN) 658542 UNIT/GM external ointment     nystatin POWD     potassium chloride ER (KLOR-CON M) 10 MEQ CR tablet     pregabalin (LYRICA) 100 MG capsule     repaglinide (PRANDIN) 1 MG tablet     Skin Protectants, Misc. ([facility] TEXTILE 10\"X144\") SHEE     tolterodine ER (DETROL LA) 2 MG 24 hr capsule     No current facility-administered medications for this visit.     Facility-Administered Medications Ordered in Other Visits   Medication     sodium chloride (PF) 0.9% PF flush 10 mL      Allergies   Allergen Reactions     Contrast Dye Anaphylaxis     Fish Allergy Anaphylaxis     Iodine Anaphylaxis     Oxycodone Other (See Comments)     Severe suicidal tendencies on this medication     Tree Nuts [Nuts] Anaphylaxis     Tree nuts only (peanuts ok)     Bactrim      Increased uric acid     Betadine [Povidone Iodine] Hives, Swelling and Difficulty breathing     Betadine     Combivent      Rash     Dulaglutide Other (See Comments)     Hx . Of thyroid cancer.     Fish      Lisinopril Other (See Comments)     Scr/grf severely reduced.      Penicillins Rash     Allopurinol Rash     Latex Rash     Wool Fiber Rash     Care Coordination Start Date: 4/6/2020   Frequency of Care Coordination: monthly     Form Last Updated: 12/20/2022     "

## 2022-12-21 ENCOUNTER — PATIENT OUTREACH (OUTPATIENT)
Dept: NEPHROLOGY | Facility: CLINIC | Age: 77
End: 2022-12-21

## 2022-12-21 LAB
ALBUMIN SERPL ELPH-MCNC: 3.6 G/DL (ref 3.7–5.1)
ALPHA1 GLOB SERPL ELPH-MCNC: 0.3 G/DL (ref 0.2–0.4)
ALPHA2 GLOB SERPL ELPH-MCNC: 0.9 G/DL (ref 0.5–0.9)
B-GLOBULIN SERPL ELPH-MCNC: 0.7 G/DL (ref 0.6–1)
GAMMA GLOB SERPL ELPH-MCNC: 0.5 G/DL (ref 0.7–1.6)
M PROTEIN SERPL ELPH-MCNC: 0 G/DL
PROT ELPH PNL UR ELPH: NORMAL
PROT PATTERN SERPL ELPH-IMP: ABNORMAL

## 2022-12-21 NOTE — PROGRESS NOTES
12/21-Called Mariel to let her know about recent lab results. Spoke w/Mariel and discussed lab results and she's wondering how to keep creatinine from increasing. I encouraged her to stay well hydrated and monitor bp's at home. I gave her my direct line and encouraged to call me w/any questions or concerns. Neph track updated and added self to care team.   Bharat العلي RN  Nephrology care coordinator  [facility]

## 2022-12-22 ENCOUNTER — TELEPHONE (OUTPATIENT)
Dept: OTHER | Facility: CLINIC | Age: 77
End: 2022-12-22

## 2022-12-22 NOTE — TELEPHONE ENCOUNTER
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    Patient is noted to have severe anaphylactic reaction to contrast dye.    Pt needs to be scheduled for consult with Vascular Surgery.  Will route to scheduling to coordinate an appointment within 24-48 hours.    PLEASE OFFER PATIENT TO BE SCHEDULED AT 11:45AM ON 12/26/22 WITH DR. PEARSON.    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    Ayesha Kennedy, SONIAN, RN-[facility]

## 2022-12-26 ENCOUNTER — OFFICE VISIT (OUTPATIENT)
Dept: OTHER | Facility: CLINIC | Age: 77
End: 2022-12-26
Attending: SURGERY
Payer: MEDICARE

## 2022-12-26 VITALS — HEART RATE: 60 BPM | DIASTOLIC BLOOD PRESSURE: 56 MMHG | SYSTOLIC BLOOD PRESSURE: 136 MMHG

## 2022-12-26 DIAGNOSIS — I65.29 STENOSIS OF CAROTID ARTERY, UNSPECIFIED LATERALITY: ICD-10-CM

## 2022-12-26 PROCEDURE — G0463 HOSPITAL OUTPT CLINIC VISIT: HCPCS

## 2022-12-26 PROCEDURE — 99204 OFFICE O/P NEW MOD 45 MIN: CPT | Performed by: SURGERY

## 2022-12-26 PROCEDURE — G0463 HOSPITAL OUTPT CLINIC VISIT: HCPCS | Performed by: SURGERY

## 2022-12-26 NOTE — PROGRESS NOTES
Patient is here to discuss consult    BP (!) 168/74 (BP Location: Left arm, Patient Position: Chair, Cuff Size: Adult Regular)   Pulse 60  taken on wrists     Questions patient would like addressed today are: N/A.    Refills are needed: No    Has homecare services and agency name:  Yes: once in a while    PAULO CASTILLO

## 2022-12-26 NOTE — PROGRESS NOTES
I have the pleasure of seeing Ms. Mariel Ribeiro in the vascular surgery clinic today.  She is a 77-year-old female who been referred to us for further evaluation of right carotid stenosis.  Patient has not had any specific symptoms of transient ischemic attack or cerebrovascular accidents.  However it is notable that she has had cognitive decline.  Patient is accompanied by her roommate who provided the majority of the history.    Past medical history is notable for myalgias, migraine, coronary artery disease, chronic obstructive pulmonary disease, morbid obesity, obstructive sleep apnea, fatty liver disease, hypothyroidism, hyperlipidemia, diabetes mellitus, heart failure, major depression, breast cancer and recurrent falls.    And past surgical history she has previously had a coronary artery bypass grafting.    Recent duplex sonography shows near occlusive stenosis of the right cervical internal carotid artery.    She has anaphylaxis when exposed to contrast dye.    Essentially she has asymptomatic carotid stenosis with near occlusion of the right internal carotid artery.  At the minimum to further delineate the diagnosis she would need a CT angiogram of the head and neck.  That did not risk-free given her history of anaphylaxis with exposure to contrast dye.    Moreover, even if we establish she has severe carotid stenosis I am not sure what we would have to offer.  We cannot stent an asymptomatic stenosis.  She is not in any shape to undergo a carotid endarterectomy given her medical history as well as her super morbid obesity.    Nothing really to offer from the vascular surgery standpoint given the clinical situation.    Total time spent: 47 minutes, > 50% on face to face counseling.

## 2022-12-27 NOTE — TELEPHONE ENCOUNTER
Outcome for 12/12/22 12:46 PM: Per patient, will upload device before appointment  TERESITA Gregorio       Detail Level: Zone Otc Regimen: Hair/Skin/Nail Supplement with Biotin

## 2022-12-29 ENCOUNTER — ALLIED HEALTH/NURSE VISIT (OUTPATIENT)
Dept: CARDIOLOGY | Facility: CLINIC | Age: 77
End: 2022-12-29
Attending: NURSE PRACTITIONER
Payer: MEDICARE

## 2022-12-29 DIAGNOSIS — I50.32 CHRONIC DIASTOLIC HEART FAILURE (H): Primary | ICD-10-CM

## 2023-01-01 ENCOUNTER — TELEPHONE (OUTPATIENT)
Dept: FAMILY MEDICINE | Facility: CLINIC | Age: 78
End: 2023-01-01
Payer: MEDICARE

## 2023-01-01 ENCOUNTER — LAB REQUISITION (OUTPATIENT)
Dept: LAB | Facility: CLINIC | Age: 78
End: 2023-01-01
Payer: MEDICARE

## 2023-01-01 ENCOUNTER — CARE COORDINATION (OUTPATIENT)
Dept: CARDIOLOGY | Facility: CLINIC | Age: 78
End: 2023-01-01
Payer: MEDICARE

## 2023-01-01 ENCOUNTER — TELEPHONE (OUTPATIENT)
Dept: FAMILY MEDICINE | Facility: CLINIC | Age: 78
End: 2023-01-01

## 2023-01-01 ENCOUNTER — ALLIED HEALTH/NURSE VISIT (OUTPATIENT)
Dept: CARDIOLOGY | Facility: CLINIC | Age: 78
End: 2023-01-01
Attending: NURSE PRACTITIONER
Payer: MEDICARE

## 2023-01-01 ENCOUNTER — LAB REQUISITION (OUTPATIENT)
Dept: LAB | Facility: HOSPITAL | Age: 78
End: 2023-01-01
Payer: MEDICARE

## 2023-01-01 ENCOUNTER — PATIENT OUTREACH (OUTPATIENT)
Dept: CARE COORDINATION | Facility: CLINIC | Age: 78
End: 2023-01-01
Payer: MEDICARE

## 2023-01-01 ENCOUNTER — MYC MEDICAL ADVICE (OUTPATIENT)
Dept: FAMILY MEDICINE | Facility: CLINIC | Age: 78
End: 2023-01-01
Payer: MEDICARE

## 2023-01-01 ENCOUNTER — MEDICAL CORRESPONDENCE (OUTPATIENT)
Dept: FAMILY MEDICINE | Facility: CLINIC | Age: 78
End: 2023-01-01
Payer: MEDICARE

## 2023-01-01 ENCOUNTER — CARE COORDINATION (OUTPATIENT)
Dept: CARE COORDINATION | Facility: CLINIC | Age: 78
End: 2023-01-01
Payer: MEDICARE

## 2023-01-01 ENCOUNTER — VIRTUAL VISIT (OUTPATIENT)
Dept: CARDIOLOGY | Facility: CLINIC | Age: 78
End: 2023-01-01
Attending: STUDENT IN AN ORGANIZED HEALTH CARE EDUCATION/TRAINING PROGRAM
Payer: MEDICARE

## 2023-01-01 ENCOUNTER — TELEPHONE (OUTPATIENT)
Dept: CARDIOLOGY | Facility: CLINIC | Age: 78
End: 2023-01-01
Payer: MEDICARE

## 2023-01-01 ENCOUNTER — TRANSFERRED RECORDS (OUTPATIENT)
Dept: HEALTH INFORMATION MANAGEMENT | Facility: CLINIC | Age: 78
End: 2023-01-01
Payer: MEDICARE

## 2023-01-01 ENCOUNTER — PATIENT OUTREACH (OUTPATIENT)
Dept: NEPHROLOGY | Facility: CLINIC | Age: 78
End: 2023-01-01
Payer: MEDICARE

## 2023-01-01 ENCOUNTER — DOCUMENTATION ONLY (OUTPATIENT)
Dept: CARDIOLOGY | Facility: CLINIC | Age: 78
End: 2023-01-01
Payer: MEDICARE

## 2023-01-01 ENCOUNTER — VIRTUAL VISIT (OUTPATIENT)
Dept: FAMILY MEDICINE | Facility: CLINIC | Age: 78
End: 2023-01-01
Payer: MEDICARE

## 2023-01-01 VITALS — BODY MASS INDEX: 46.61 KG/M2 | WEIGHT: 290 LBS | HEIGHT: 66 IN

## 2023-01-01 DIAGNOSIS — N18.4 TYPE 2 DIABETES MELLITUS WITH STAGE 4 CHRONIC KIDNEY DISEASE, WITH LONG-TERM CURRENT USE OF INSULIN (H): ICD-10-CM

## 2023-01-01 DIAGNOSIS — M10.9 GOUT, UNSPECIFIED: ICD-10-CM

## 2023-01-01 DIAGNOSIS — I50.32 CHRONIC DIASTOLIC HEART FAILURE (H): Primary | ICD-10-CM

## 2023-01-01 DIAGNOSIS — N39.0 URINARY TRACT INFECTION, SITE NOT SPECIFIED: ICD-10-CM

## 2023-01-01 DIAGNOSIS — Z79.4 TYPE 2 DIABETES MELLITUS WITH STAGE 4 CHRONIC KIDNEY DISEASE, WITH LONG-TERM CURRENT USE OF INSULIN (H): ICD-10-CM

## 2023-01-01 DIAGNOSIS — E79.0 HYPERURICEMIA: Primary | ICD-10-CM

## 2023-01-01 DIAGNOSIS — E11.22 TYPE 2 DIABETES MELLITUS WITH STAGE 4 CHRONIC KIDNEY DISEASE, WITH LONG-TERM CURRENT USE OF INSULIN (H): ICD-10-CM

## 2023-01-01 DIAGNOSIS — M1A.09X0 IDIOPATHIC CHRONIC GOUT OF MULTIPLE SITES WITHOUT TOPHUS: ICD-10-CM

## 2023-01-01 DIAGNOSIS — N30.00 ACUTE CYSTITIS WITHOUT HEMATURIA: Primary | ICD-10-CM

## 2023-01-01 DIAGNOSIS — M25.512 PAIN IN JOINT OF LEFT SHOULDER: ICD-10-CM

## 2023-01-01 DIAGNOSIS — E11.22 TYPE 2 DIABETES MELLITUS WITH DIABETIC CHRONIC KIDNEY DISEASE (H): ICD-10-CM

## 2023-01-01 DIAGNOSIS — I25.10 ATHEROSCLEROSIS OF NATIVE CORONARY ARTERY WITHOUT ANGINA PECTORIS, UNSPECIFIED WHETHER NATIVE OR TRANSPLANTED HEART: Chronic | ICD-10-CM

## 2023-01-01 DIAGNOSIS — N18.9 CHRONIC KIDNEY DISEASE, UNSPECIFIED: ICD-10-CM

## 2023-01-01 DIAGNOSIS — Z53.9 DIAGNOSIS NOT YET DEFINED: Primary | ICD-10-CM

## 2023-01-01 DIAGNOSIS — I12.0 HYPERTENSIVE CHRONIC KIDNEY DISEASE WITH STAGE 5 CHRONIC KIDNEY DISEASE OR END STAGE RENAL DISEASE (H): ICD-10-CM

## 2023-01-01 DIAGNOSIS — N18.30 STAGE 3 CHRONIC KIDNEY DISEASE, UNSPECIFIED WHETHER STAGE 3A OR 3B CKD (H): ICD-10-CM

## 2023-01-01 DIAGNOSIS — E03.9 HYPOTHYROIDISM, UNSPECIFIED: ICD-10-CM

## 2023-01-01 DIAGNOSIS — I25.10 ATHEROSCLEROSIS OF NATIVE CORONARY ARTERY WITHOUT ANGINA PECTORIS, UNSPECIFIED WHETHER NATIVE OR TRANSPLANTED HEART: ICD-10-CM

## 2023-01-01 DIAGNOSIS — E03.9 HYPOTHYROIDISM, UNSPECIFIED TYPE: Primary | Chronic | ICD-10-CM

## 2023-01-01 DIAGNOSIS — I10 HYPERTENSION GOAL BP (BLOOD PRESSURE) < 130/80: ICD-10-CM

## 2023-01-01 DIAGNOSIS — I25.5 ISCHEMIC CARDIOMYOPATHY: ICD-10-CM

## 2023-01-01 LAB
ALBUMIN UR-MCNC: 100 MG/DL
ANION GAP SERPL CALCULATED.3IONS-SCNC: 9 MMOL/L (ref 7–15)
APPEARANCE UR: ABNORMAL
BACTERIA #/AREA URNS HPF: ABNORMAL /HPF
BACTERIA SNV CULT: NO GROWTH
BACTERIA UR CULT: ABNORMAL
BACTERIA UR CULT: ABNORMAL
BILIRUB UR QL STRIP: NEGATIVE
BUN SERPL-MCNC: 53.3 MG/DL (ref 8–23)
CALCIUM SERPL-MCNC: 9.8 MG/DL (ref 8.8–10.2)
CHLORIDE SERPL-SCNC: 100 MMOL/L (ref 98–107)
CHOLEST SERPL-MCNC: 200 MG/DL
COLOR UR AUTO: ABNORMAL
CREAT SERPL-MCNC: 1.67 MG/DL (ref 0.51–0.95)
CREAT UR-MCNC: 30.6 MG/DL
DEPRECATED HCO3 PLAS-SCNC: 28 MMOL/L (ref 22–29)
EGFRCR SERPLBLD CKD-EPI 2021: 31 ML/MIN/1.73M2
FASTING STATUS PATIENT QL REPORTED: NO
GLUCOSE SERPL-MCNC: 155 MG/DL (ref 70–99)
GLUCOSE UR STRIP-MCNC: NEGATIVE MG/DL
HBA1C MFR BLD: 7.5 %
HDLC SERPL-MCNC: 38 MG/DL
HGB UR QL STRIP: ABNORMAL
KETONES UR STRIP-MCNC: NEGATIVE MG/DL
LDLC SERPL CALC-MCNC: 99 MG/DL
LEUKOCYTE ESTERASE UR QL STRIP: ABNORMAL
MICROALBUMIN UR-MCNC: 39.2 MG/L
MICROALBUMIN/CREAT UR: 128.1 MG/G CR (ref 0–25)
NITRATE UR QL: NEGATIVE
NONHDLC SERPL-MCNC: 162 MG/DL
PH UR STRIP: 6 [PH] (ref 5–7)
POTASSIUM SERPL-SCNC: 4.3 MMOL/L (ref 3.4–5.3)
RBC URINE: 0 /HPF
SODIUM SERPL-SCNC: 137 MMOL/L (ref 135–145)
SP GR UR STRIP: 1.01 (ref 1–1.03)
SQUAMOUS EPITHELIAL: 5 /HPF
TRIGL SERPL-MCNC: 316 MG/DL
TSH SERPL DL<=0.005 MIU/L-ACNC: 0.3 UIU/ML (ref 0.3–4.2)
URATE SERPL-MCNC: 4.8 MG/DL (ref 2.4–5.7)
UROBILINOGEN UR STRIP-MCNC: <2 MG/DL
WBC CLUMPS #/AREA URNS HPF: PRESENT /HPF
WBC URINE: >182 /HPF

## 2023-01-01 PROCEDURE — 82570 ASSAY OF URINE CREATININE: CPT | Mod: ORL | Performed by: NURSE PRACTITIONER

## 2023-01-01 PROCEDURE — 87086 URINE CULTURE/COLONY COUNT: CPT | Mod: ORL | Performed by: NURSE PRACTITIONER

## 2023-01-01 PROCEDURE — 93264 REM MNTR WRLS P-ART PRS SNR: CPT | Performed by: NURSE PRACTITIONER

## 2023-01-01 PROCEDURE — 84443 ASSAY THYROID STIM HORMONE: CPT | Mod: ORL | Performed by: NURSE PRACTITIONER

## 2023-01-01 PROCEDURE — 80061 LIPID PANEL: CPT | Mod: ORL | Performed by: NURSE PRACTITIONER

## 2023-01-01 PROCEDURE — 81001 URINALYSIS AUTO W/SCOPE: CPT | Mod: ORL | Performed by: NURSE PRACTITIONER

## 2023-01-01 PROCEDURE — 84550 ASSAY OF BLOOD/URIC ACID: CPT | Mod: ORL | Performed by: NURSE PRACTITIONER

## 2023-01-01 PROCEDURE — 80048 BASIC METABOLIC PNL TOTAL CA: CPT | Mod: ORL | Performed by: NURSE PRACTITIONER

## 2023-01-01 PROCEDURE — 99214 OFFICE O/P EST MOD 30 MIN: CPT | Mod: 95 | Performed by: FAMILY MEDICINE

## 2023-01-01 PROCEDURE — 99214 OFFICE O/P EST MOD 30 MIN: CPT | Mod: VID | Performed by: NURSE PRACTITIONER

## 2023-01-01 PROCEDURE — G0179 MD RECERTIFICATION HHA PT: HCPCS | Performed by: NURSE PRACTITIONER

## 2023-01-01 PROCEDURE — 83036 HEMOGLOBIN GLYCOSYLATED A1C: CPT | Mod: ORL | Performed by: NURSE PRACTITIONER

## 2023-01-01 RX ORDER — INSULIN GLARGINE 100 [IU]/ML
32 INJECTION, SOLUTION SUBCUTANEOUS EVERY MORNING
Qty: 30 ML | Refills: 1 | Status: SHIPPED | OUTPATIENT
Start: 2023-01-01 | End: 2024-01-01

## 2023-01-01 RX ORDER — ATORVASTATIN CALCIUM 40 MG/1
TABLET, FILM COATED ORAL
Qty: 90 TABLET | Refills: 1 | Status: SHIPPED | OUTPATIENT
Start: 2023-01-01 | End: 2024-01-01

## 2023-01-01 RX ORDER — PREGABALIN 100 MG/1
100 CAPSULE ORAL 2 TIMES DAILY
Qty: 60 CAPSULE | Refills: 1 | Status: SHIPPED | OUTPATIENT
Start: 2023-01-01 | End: 2024-01-01

## 2023-01-01 RX ORDER — FEBUXOSTAT 80 MG/1
80 TABLET, FILM COATED ORAL DAILY
Qty: 90 TABLET | Refills: 0 | Status: SHIPPED | OUTPATIENT
Start: 2023-01-01 | End: 2024-01-01

## 2023-01-01 RX ORDER — CEFDINIR 300 MG/1
300 CAPSULE ORAL 2 TIMES DAILY
Qty: 14 CAPSULE | Refills: 0 | Status: SHIPPED | OUTPATIENT
Start: 2023-01-01 | End: 2024-01-01

## 2023-01-01 RX ORDER — INSULIN GLARGINE 100 [IU]/ML
26 INJECTION, SOLUTION SUBCUTANEOUS EVERY MORNING
Qty: 15 ML | Refills: 1 | Status: SHIPPED | OUTPATIENT
Start: 2023-01-01 | End: 2023-01-01

## 2023-01-01 RX ORDER — INSULIN GLARGINE 100 [IU]/ML
26 INJECTION, SOLUTION SUBCUTANEOUS EVERY MORNING
COMMUNITY
Start: 2023-01-01 | End: 2023-01-01

## 2023-01-01 ASSESSMENT — PAIN SCALES - GENERAL: PAINLEVEL: MODERATE PAIN (5)

## 2023-01-01 ASSESSMENT — PATIENT HEALTH QUESTIONNAIRE - PHQ9
SUM OF ALL RESPONSES TO PHQ QUESTIONS 1-9: 11
10. IF YOU CHECKED OFF ANY PROBLEMS, HOW DIFFICULT HAVE THESE PROBLEMS MADE IT FOR YOU TO DO YOUR WORK, TAKE CARE OF THINGS AT HOME, OR GET ALONG WITH OTHER PEOPLE: EXTREMELY DIFFICULT
SUM OF ALL RESPONSES TO PHQ QUESTIONS 1-9: 11

## 2023-01-01 ASSESSMENT — ACTIVITIES OF DAILY LIVING (ADL)
DEPENDENT_IADLS:: CLEANING;COOKING;LAUNDRY;SHOPPING;MEAL PREPARATION;MEDICATION MANAGEMENT;MONEY MANAGEMENT;TRANSPORTATION;INCONTINENCE

## 2023-01-02 DIAGNOSIS — E03.8 OTHER SPECIFIED HYPOTHYROIDISM: ICD-10-CM

## 2023-01-02 DIAGNOSIS — E78.5 HYPERLIPIDEMIA WITH TARGET LDL LESS THAN 70: ICD-10-CM

## 2023-01-02 NOTE — PROGRESS NOTES
Patient did not meet minimum requirements for billing. This is not a billable encounter. Rosina Mays RN

## 2023-01-03 DIAGNOSIS — Z79.4 TYPE 2 DIABETES MELLITUS WITH STAGE 3 CHRONIC KIDNEY DISEASE, WITH LONG-TERM CURRENT USE OF INSULIN, UNSPECIFIED WHETHER STAGE 3A OR 3B CKD (H): Chronic | ICD-10-CM

## 2023-01-03 DIAGNOSIS — E11.22 TYPE 2 DIABETES MELLITUS WITH STAGE 3 CHRONIC KIDNEY DISEASE, WITH LONG-TERM CURRENT USE OF INSULIN, UNSPECIFIED WHETHER STAGE 3A OR 3B CKD (H): Chronic | ICD-10-CM

## 2023-01-03 DIAGNOSIS — N18.30 TYPE 2 DIABETES MELLITUS WITH STAGE 3 CHRONIC KIDNEY DISEASE, WITH LONG-TERM CURRENT USE OF INSULIN, UNSPECIFIED WHETHER STAGE 3A OR 3B CKD (H): Chronic | ICD-10-CM

## 2023-01-03 RX ORDER — LEVOTHYROXINE SODIUM 150 MCG
TABLET ORAL
Qty: 90 TABLET | Refills: 3 | Status: SHIPPED | OUTPATIENT
Start: 2023-01-03 | End: 2024-01-01

## 2023-01-03 RX ORDER — INSULIN GLARGINE 100 [IU]/ML
INJECTION, SOLUTION SUBCUTANEOUS
Qty: 15 ML | Refills: 3 | Status: SHIPPED | OUTPATIENT
Start: 2023-01-03 | End: 2023-10-03

## 2023-01-03 NOTE — TELEPHONE ENCOUNTER
TSH   Date Value Ref Range Status   12/19/2022 1.37 0.30 - 4.20 uIU/mL Final   03/09/2022 3.56 0.40 - 4.00 mU/L Final   04/29/2021 7.12 (H) 0.40 - 4.00 mU/L Final     Synthroid Refilled per [facility] refill protocol    SONIA MccannN RN  [facility]

## 2023-01-03 NOTE — TELEPHONE ENCOUNTER
insulin glargine (LANTUS SOLOSTAR) 100 UNIT/ML pen      Last Written Prescription Date:  08/04/22  Last Fill Quantity: 15mL,   # refills: 3  Last Office Visit : 12/13/22  Future Office visit:  06/13/23    Routing refill request to provider for review/approval because:  Insulin - refilled per clinic

## 2023-01-04 ENCOUNTER — MYC MEDICAL ADVICE (OUTPATIENT)
Dept: FAMILY MEDICINE | Facility: CLINIC | Age: 78
End: 2023-01-04

## 2023-01-04 RX ORDER — NIACIN 500 MG/1
TABLET, EXTENDED RELEASE ORAL
Qty: 180 TABLET | Refills: 0 | OUTPATIENT
Start: 2023-01-04

## 2023-01-04 NOTE — TELEPHONE ENCOUNTER
"--I attempted to call patient to make sure she stopped taking niacin but no answer and voice mail is not identified.  --Zweemiehart sent to patient.      niacin ER (NIASPAN) 500 MG CR tablet (Discontinued)  Discontinued None Malika Wolf, CHANTAL CNP 12/8/22 1523     --Last visit:  12/8/2022 kevin Wolf -  \"Today we discussed discontinuing niacin .....\"    --Future Visit: none with family practice.      Message sent to pharmacy - Refusal reason: Medication has been discontinued (DISCONTINUED BY MALIKA WOLF ON 12/8/22.).  Loraine VERA      "

## 2023-01-05 ENCOUNTER — CARE COORDINATION (OUTPATIENT)
Dept: CARDIOLOGY | Facility: CLINIC | Age: 78
End: 2023-01-05

## 2023-01-05 NOTE — PROGRESS NOTES
[facility] - CardioMEMS Reading Review    January 5, 2023   CardioMEMS reviewed.  Current diuretic: Bumex 4 mg daily  Recent plan of care changes: none. Has started using Mems more consistently this week. Sending readings sitting up so will need to determine new goal range.     Current Threshold parameters:       Today's Waveform:       Readings:           RHC Date Wedge Pressure MEMS PAD during cath  (average of the 3 values)     7/1/2019 w/MEMS implant     20 mmHg   18 mmHg           Rosina Mays RN

## 2023-01-10 ENCOUNTER — CARE COORDINATION (OUTPATIENT)
Dept: CARDIOLOGY | Facility: CLINIC | Age: 78
End: 2023-01-10

## 2023-01-10 NOTE — PROGRESS NOTES
"[facility] - CardioMEMS Reading Review    January 10, 2023   CardioMEMS reviewed.  Current diuretic: Bumex 4 mg daily  Recent plan of care changes: Called Mariel to check in. Sent [employer] message last week to check on weights as noted creatinine checked a few weeks back for another provider was elevated. Has been sending cardiomems readings more often but is difficult to gauge \"good range\" since is now sending them sitting up. Spoke with Odalis. Odalis reports Mariel doesn't weigh often but when she stepped on the scale the other week was at 299 which is good for her. Has been urinating a lot. She reports they had a recent nephrology visit and they didn't seem concerned with her labs. She feels ok from a fluid perspective. No worsening shortness of breath or swelling.  No dizziness or lightheadedness that she complains of.   Will review with providers.    Current Threshold parameters:       Today's Waveform:       Readings:           RHC Date Wedge Pressure MEMS PAD during cath  (average of the 3 values)     7/1/2019 w/MEMS implant     20 mmHg   18 mmHg           Rosina Mays RN                 "

## 2023-01-12 NOTE — PROGRESS NOTES
Reviewed PA goal with Magdalena Hall and updated PA goal set of 3-6 now that patient is sending readings sitting up.   Rosina Mays RN

## 2023-01-13 ENCOUNTER — ANCILLARY PROCEDURE (OUTPATIENT)
Dept: GENERAL RADIOLOGY | Facility: CLINIC | Age: 78
End: 2023-01-13
Attending: PODIATRIST
Payer: MEDICARE

## 2023-01-13 ENCOUNTER — OFFICE VISIT (OUTPATIENT)
Dept: ORTHOPEDICS | Facility: CLINIC | Age: 78
End: 2023-01-13
Payer: MEDICARE

## 2023-01-13 DIAGNOSIS — B35.1 OM (ONYCHOMYCOSIS): ICD-10-CM

## 2023-01-13 DIAGNOSIS — I73.89 OTHER SPECIFIED PERIPHERAL VASCULAR DISEASES (H): ICD-10-CM

## 2023-01-13 DIAGNOSIS — I73.9 PVD (PERIPHERAL VASCULAR DISEASE) (H): ICD-10-CM

## 2023-01-13 DIAGNOSIS — M79.674 PAIN OF TOE OF RIGHT FOOT: ICD-10-CM

## 2023-01-13 DIAGNOSIS — M79.674 PAIN OF TOE OF RIGHT FOOT: Primary | ICD-10-CM

## 2023-01-13 DIAGNOSIS — L60.2 LONG TOENAIL: ICD-10-CM

## 2023-01-13 PROCEDURE — 99214 OFFICE O/P EST MOD 30 MIN: CPT | Mod: 25 | Performed by: PODIATRIST

## 2023-01-13 PROCEDURE — 73630 X-RAY EXAM OF FOOT: CPT | Mod: RT | Performed by: SURGERY
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
## 2023-01-15 DIAGNOSIS — D50.8 IRON DEFICIENCY ANEMIA SECONDARY TO INADEQUATE DIETARY IRON INTAKE: ICD-10-CM

## 2023-01-15 DIAGNOSIS — I50.32 CHRONIC DIASTOLIC HEART FAILURE (H): Chronic | ICD-10-CM

## 2023-01-15 DIAGNOSIS — N39.46 MIXED INCONTINENCE: ICD-10-CM

## 2023-01-16 RX ORDER — TOLTERODINE 2 MG/1
CAPSULE, EXTENDED RELEASE ORAL
Qty: 180 CAPSULE | Refills: 3 | Status: SHIPPED | OUTPATIENT
Start: 2023-01-16 | End: 2024-01-01

## 2023-01-17 ENCOUNTER — PATIENT OUTREACH (OUTPATIENT)
Dept: NEPHROLOGY | Facility: CLINIC | Age: 78
End: 2023-01-17
Payer: MEDICARE

## 2023-01-17 DIAGNOSIS — N18.32 CHRONIC KIDNEY DISEASE, STAGE 3B (H): Primary | ICD-10-CM

## 2023-01-17 NOTE — PROGRESS NOTES
Patient has been added to CKD No[facility] due to GFR less than 35 and CKCC.     Neph Tracking Flowsheet Last Filled Values     Patient's Referral Dates Auto Populate Patient's Referral Dates    MTM Referral 11/17/22    Palliative Referral 9/22/22    Home Care Referral 2/10/22    Journey Referral 1/17/23        Aileen Durant RN, BSN   Nephrology RN Care Coordinator   [facility]

## 2023-01-18 ENCOUNTER — CARE COORDINATION (OUTPATIENT)
Dept: CARDIOLOGY | Facility: CLINIC | Age: 78
End: 2023-01-18
Payer: MEDICARE

## 2023-01-18 ENCOUNTER — PATIENT OUTREACH (OUTPATIENT)
Dept: CARE COORDINATION | Facility: CLINIC | Age: 78
End: 2023-01-18
Payer: MEDICARE

## 2023-01-18 NOTE — TELEPHONE ENCOUNTER
Routing refill request to provider for review/approval because:  --Nataliia is only family practice provider that has ordered this medication.    --Last visit:  12/8/2022 [facility] visit.    --Future Visit: none with family practice.    --Last Written Prescription:     Disp Refills Start End WINSOME   ferrous gluconate (FERGON) 324 (38 Fe) MG tablet 36 tablet 0 9/2/2022  No   Sig: TAKE 1 TABLET BY MOUTH EVERY MONDAY, WEDNESDAY, AND FRIDAY MORNING

## 2023-01-18 NOTE — PROGRESS NOTES
Clinic Care Coordination Contact    Follow Up Progress Note      Assessment: RN CC contacted patient for monthly outreach. Spoke with Vero & Mariel over the phone. Mariel is still in alot of pain in buttocks. She does change positions. Pressure sore not an issue. They decided to add a rail to their existing bed instead of obtaining a hospital bed. This seems to help Mariel to pull herself up in bed. They plan on shopping for a new mattress that isn't as thick as most. So Mariel won't slide off the bed. Have been using [facility] for 4 hours at a time to watch Mariel when Vero has to go out or to work.  Podiatrist at [facility] clips toenails for Mariel every 3 months. She did well mobility wise at last appointment. Mariel had a carotid US on 12/26/22 for right carotid stenosis. Decision by surgeon after carotid US was to leave everything as is; it would be more dangerous to do surgery.    Care Gaps:    Health Maintenance Due   Topic Date Due     HF ACTION PLAN  11/30/2021     EYE EXAM  06/10/2022     URINE DRUG SCREEN  01/20/2023       Postponed due to just got home from Cardiology appointment.     Care Plans  Care Plan: 1. Community Resources     Problem: Sufficient In-home support     Goal: In-home Support     Start Date: 9/20/2022 Expected End Date: 2/14/2023    This Visit's Progress: 50%    Priority: Medium    Note:     Barriers: Limited mobility, diagnoses of multiple, chronic, complex medical conditions; provider availability - wait time to complete appointments  Strengths: Motivated; agreeable to care coordination  Patient expressed understanding of goal: Yes  Action steps to achieve this goal:  1. I will review and research resources on home care services provided.   2. I will discuss, review, schedule and complete recommended overdue health maintenance with my primary care provider.   3. I will contact my care team with questions, concerns, support needs. I will use the clinic as  a resource and I understand I can contact my clinic with 24/7 after hours services available. Care Coordinator will remain available as needed.     Annual assessment due: 4/2023                      Care Plan: Research into hospital bed         Intervention/Education provided during outreach: Intervention/Education provided during outreach: Discussed patients plan of care. CC RN asked open ended questions, provided support, resources, and encouragement as needed. Patient will reach out to care team sooner than planned with new questions or concerns.     Plan: RN CC will continue to follow patient for any needs. Discussed the possibility of transitioning to maintenance at next outreach.     Care Coordinator will follow up in 1 month.     Kayla Salazar RN, BSN, CPHN, CM  [facility] Ambulatory Care Management  [facility]  Phone: [phone]  Email: [email or web address]

## 2023-01-18 NOTE — PROGRESS NOTES
[facility] CORE Clinic - CardioMEMS Reading Review    January 18, 2023   CardioMEMS reviewed.  Current diuretic: Bumex 4 mg daily  Recent plan of care changes:     Current Threshold parameters:         Today's Waveform:       Readings:               Rosina Mays RN

## 2023-01-19 RX ORDER — FERROUS GLUCONATE 324(38)MG
TABLET ORAL
Qty: 36 TABLET | Refills: 3 | Status: SHIPPED | OUTPATIENT
Start: 2023-01-19 | End: 2024-01-01

## 2023-01-25 ENCOUNTER — CARE COORDINATION (OUTPATIENT)
Dept: CARDIOLOGY | Facility: CLINIC | Age: 78
End: 2023-01-25
Payer: MEDICARE

## 2023-01-25 ENCOUNTER — MYC MEDICAL ADVICE (OUTPATIENT)
Dept: FAMILY MEDICINE | Facility: CLINIC | Age: 78
End: 2023-01-25
Payer: MEDICARE

## 2023-01-25 NOTE — PROGRESS NOTES
[facility] CORE Clinic - CardioMEMS Reading Review    January 25, 2023   CardioMEMS reviewed.  Current diuretic: Bumex 4 mg daily  Recent plan of care changes: none. PAD updated goal of 3-6. Has been on low side of this range. Will review with provider.     Current Threshold parameters:         Today's Waveform:       Readings:               Rosina Mays RN

## 2023-01-25 NOTE — PROGRESS NOTES
Reviewed with Austin Miguel and called with following recommendations:  Date: 1/25/2023    Time of Call: 9:47 AM     Diagnosis:  HF     [ VORB ] Ordering provider: Austin Miguel NP  Order: Decrease Bumex to 3 mg daily for 3 days, then return to 4 mg daily     Order received by: Rosina Mays RN      Follow-up/additional notes: Reviewed with Odalis who verbalized understanding. Hasn't taken dose yet today and will decrease today through Friday. Will reassess on Monday.

## 2023-01-26 ENCOUNTER — TELEPHONE (OUTPATIENT)
Dept: FAMILY MEDICINE | Facility: CLINIC | Age: 78
End: 2023-01-26
Payer: MEDICARE

## 2023-01-26 NOTE — TELEPHONE ENCOUNTER
Friend Odalis called and patient wants private pay Home Health Aide one time a week for 9 weeks for showers   Need provider OK.  Ting Andrade RN  [facility]

## 2023-01-27 NOTE — TELEPHONE ENCOUNTER
See 1/26/23 TE.    Responded to patient via [employer].    Sandra Sutton RN  [facility]

## 2023-01-28 ENCOUNTER — ALLIED HEALTH/NURSE VISIT (OUTPATIENT)
Dept: CARDIOLOGY | Facility: CLINIC | Age: 78
End: 2023-01-28
Attending: NURSE PRACTITIONER
Payer: MEDICARE

## 2023-01-28 DIAGNOSIS — I50.32 CHRONIC DIASTOLIC HEART FAILURE (H): Primary | ICD-10-CM

## 2023-01-28 PROCEDURE — 93264 REM MNTR WRLS P-ART PRS SNR: CPT | Performed by: NURSE PRACTITIONER

## 2023-01-30 ENCOUNTER — MEDICAL CORRESPONDENCE (OUTPATIENT)
Dept: HEALTH INFORMATION MANAGEMENT | Facility: CLINIC | Age: 78
End: 2023-01-30

## 2023-01-30 ENCOUNTER — CARE COORDINATION (OUTPATIENT)
Dept: CARDIOLOGY | Facility: CLINIC | Age: 78
End: 2023-01-30
Payer: MEDICARE

## 2023-01-30 NOTE — PROGRESS NOTES
[facility] CORE Clinic - CardioMEMS Reading Review    January 30, 2023   CardioMEMS reviewed.  Current diuretic: Bumex 4 mg daily  Recent plan of care changes: 1/25/23 decreased Bumex to 3 mg daily for 3 days. Now back on 4 mg daily. PAD still on low side of threshold or below. Will review with provider.     Current Threshold parameters:         Today's Waveform:       Readings:               Rosina Mays RN

## 2023-02-01 NOTE — PROGRESS NOTES
Reviewed with Austin Miguel NP. Message sent with following recommendations:  Date: 2/1/2023    Time of Call: 8:27 AM     Diagnosis:  HF     [ VORB ] Ordering provider: Austin Miguel NP  Order: Reduce Bumex to 2 mg daily for 2 days, then return to 4 mg daily.      Order received by: Rosina Mays RN      Follow-up/additional notes:

## 2023-02-01 NOTE — PROGRESS NOTES
[facility] Heart - C.O.R.E. Clinic:  CardioMems Routine Remote Evaluation     Dates: 12/30/22 through 1/28/23?   ??   Adjustments made in the last month: 1/25/23 Decreased Bumex to 3 mg daily for 3 days for low PAD. ?     These adjustments have been discussed with the patient/caregiver and they express verbal understanding.    Threshold Alert Settings: 3-6  (seated)  Last 30 days:             Future Appointments   Date Time Provider Department Center   4/14/2023  1:20 PM Bharat Fitzgerald DPM [facility]   4/17/2023  2:30 PM [facility]2 [facility]   4/17/2023  3:00 PM Opal Weber MD [facility]   6/13/2023  3:00 PM Muriel Will PA-C [facility]   ?

## 2023-02-01 NOTE — PROGRESS NOTES
Mychart message not read. Called Odalis, reviewed recommendations and she verbalized understanding. Rosina Mays RN

## 2023-02-07 ENCOUNTER — VIRTUAL VISIT (OUTPATIENT)
Dept: FAMILY MEDICINE | Facility: CLINIC | Age: 78
End: 2023-02-07
Payer: MEDICARE

## 2023-02-07 DIAGNOSIS — M53.3 PAIN IN THE COCCYX: ICD-10-CM

## 2023-02-07 DIAGNOSIS — I50.31 ACUTE HEART FAILURE WITH PRESERVED EJECTION FRACTION (HFPEF) (H): ICD-10-CM

## 2023-02-07 DIAGNOSIS — I12.0 HYPERTENSIVE CHRONIC KIDNEY DISEASE WITH STAGE 5 CHRONIC KIDNEY DISEASE OR END STAGE RENAL DISEASE (H): ICD-10-CM

## 2023-02-07 DIAGNOSIS — Z17.0 MALIGNANT NEOPLASM OF LOWER-INNER QUADRANT OF LEFT BREAST IN FEMALE, ESTROGEN RECEPTOR POSITIVE (H): ICD-10-CM

## 2023-02-07 DIAGNOSIS — F33.41 RECURRENT MAJOR DEPRESSIVE DISORDER, IN PARTIAL REMISSION (H): Primary | Chronic | ICD-10-CM

## 2023-02-07 DIAGNOSIS — J44.9 CHRONIC OBSTRUCTIVE PULMONARY DISEASE, UNSPECIFIED COPD TYPE (H): ICD-10-CM

## 2023-02-07 DIAGNOSIS — G89.4 CHRONIC PAIN SYNDROME: ICD-10-CM

## 2023-02-07 DIAGNOSIS — E66.01 MORBID OBESITY (H): ICD-10-CM

## 2023-02-07 DIAGNOSIS — C50.312 MALIGNANT NEOPLASM OF LOWER-INNER QUADRANT OF LEFT BREAST IN FEMALE, ESTROGEN RECEPTOR POSITIVE (H): ICD-10-CM

## 2023-02-07 PROBLEM — I20.89 ANGINA AT REST (H): Chronic | Status: RESOLVED | Noted: 2018-06-29 | Resolved: 2023-02-07

## 2023-02-07 PROCEDURE — 99214 OFFICE O/P EST MOD 30 MIN: CPT | Mod: 95 | Performed by: NURSE PRACTITIONER

## 2023-02-07 RX ORDER — VENLAFAXINE HYDROCHLORIDE 37.5 MG/1
37.5 CAPSULE, EXTENDED RELEASE ORAL DAILY
Qty: 90 CAPSULE | Refills: 1 | Status: SHIPPED | OUTPATIENT
Start: 2023-02-07 | End: 2023-07-10

## 2023-02-07 ASSESSMENT — PATIENT HEALTH QUESTIONNAIRE - PHQ9
10. IF YOU CHECKED OFF ANY PROBLEMS, HOW DIFFICULT HAVE THESE PROBLEMS MADE IT FOR YOU TO DO YOUR WORK, TAKE CARE OF THINGS AT HOME, OR GET ALONG WITH OTHER PEOPLE: SOMEWHAT DIFFICULT
SUM OF ALL RESPONSES TO PHQ QUESTIONS 1-9: 10
SUM OF ALL RESPONSES TO PHQ QUESTIONS 1-9: 10

## 2023-02-07 NOTE — PROGRESS NOTES
Mariel is a 77 year old who is being evaluated via a billable video visit.      How would you like to obtain your AVS? MyChart  If the video visit is dropped, the invitation should be resent by: Text to cell phone: [phone]  Will anyone else be joining your video visit? No          Assessment & Plan     Recurrent major depressive disorder, in partial remission (H)  Depressive symptoms worsened since discontinuing cymbalta.  We can initiate Effexor which is a better option with her renal function.  Would not want to exceed 50% of maximum dose, but we could certainly titrate up to 75mg in a month or so.  This may improve some of her chronic pain symptoms which have also increased since weaning off her cymbalta.  - venlafaxine (EFFEXOR XR) 37.5 MG 24 hr capsule; Take 1 capsule (37.5 mg) by mouth daily    Chronic pain syndrome  Limited options to treat her pain with CKD and high fall risk.  We again discussed that we may not achieve no pain, but that our goal should be to achieve tolerable pain.  Hopeful that reinitiating an SNRI may improve symptoms.  She does not want to consider narcotics and I would have reservations with using them due to high fall risk.  Recommended continuing scheduled tylenol and topical voltaren/lidocaine.  She is also on lyrica.  We can consider referral back to pain clinic for additional recommendations and I would like her to consider steroid injection again at some point.      Pain in the coccyx  See above    Chronic obstructive pulmonary disease, unspecified COPD type (H)  Stable, monitoring.    Acute heart failure with preserved ejection fraction (HFpEF) (H)  Stable, follows with cardiolgoy    Hypertensive chronic kidney disease with stage 5 chronic kidney disease or end stage renal disease (H)  Follows with nephrology, avoid NSAIDs and other nephrotoxins.     Morbid obesity (H)  Contributing factor to pain symptoms.      Malignant neoplasm of lower-inner quadrant of left breast in  female, estrogen receptor positive (H)  History of.            Prescription drug management  30 minutes spent on the date of the encounter doing chart review, history and exam, documentation and further activities per the note           No follow-ups on file.    CHANTAL Grace CNP  [facility]    Breana Floyd is a 77 year old, presenting for the following health issues:  Recheck Medication (Med Check )      History of Present Illness       Back Pain:  She presents for follow up of back pain. Patient's back pain is a chronic problem.  Location of back pain:  Right buttock and left buttock  Description of back pain: stabbing  Back pain spreads: nowhere    Since patient first noticed back pain, pain is: gradually worsening  Does back pain interfere with her job:  Not applicable      She eats 2-3 servings of fruits and vegetables daily.She consumes 0 sweetened beverage(s) daily. She exercises with enough effort to increase her heart rate 3 or less days per week.   She is taking medications regularly.    Today's PHQ-9         PHQ-9 Total Score: 10    PHQ-9 Q9 Thoughts of better off dead/self-harm past 2 weeks :   Not at all    How difficult have these problems made it for you to do your work, take care of things at home, or get along with other people: Somewhat difficult       We stopped the duloxetine at last visit.  As she was weaning off of it, Mariel started to complain more about her pain.  Also having some more mood issues and sleep issues since stopping.    Does notice that pain in the coccyx area has slightly increased since stopping too.  They continue to use tylenol, OTC voltaren gel.  History of cortisone injections that did not help, has tried lidocaine patches that have not helped.    Had recent MRI at an outside ED.    Review of Systems   Constitutional, HEENT, cardiovascular, pulmonary, gi and gu systems are negative, except as otherwise noted.      Objective            Vitals:  No vitals were obtained today due to virtual visit.    Physical Exam   GENERAL: alert and no distress  EYES: Eyes grossly normal to inspection.  No discharge or erythema, or obvious scleral/conjunctival abnormalities.  RESP: No audible wheeze, cough, or visible cyanosis.  No visible retractions or increased work of breathing.    SKIN: Visible skin clear. No significant rash, abnormal pigmentation or lesions.  NEURO: Cranial nerves grossly intact.  Mentation and speech appropriate for age.  PSYCH: concentration poor and affect flat                Video-Visit Details    Type of service:  Video Visit   Video Start Time: 1306  Video End Time:1328    Originating Location (pt. Location): Home    Distant Location (provider location):  Off-site  Platform used for Video Visit: [employer]

## 2023-02-09 ENCOUNTER — CARE COORDINATION (OUTPATIENT)
Dept: CARDIOLOGY | Facility: CLINIC | Age: 78
End: 2023-02-09
Payer: MEDICARE

## 2023-02-09 DIAGNOSIS — I50.32 CHRONIC DIASTOLIC HEART FAILURE (H): Chronic | ICD-10-CM

## 2023-02-09 NOTE — PROGRESS NOTES
Reviewed with Austin Miguel NP. Called with following recommendations:  Date: 2/9/2023    Time of Call: 10:31 AM     Diagnosis:  HF     [ VORB ] Ordering provider: Austin Miguel NP  Order:      Order received by: Decrease Bumex to 3 mg daily.      Follow-up/additional notes: left voicemail for Mariel and breann message sent.

## 2023-02-09 NOTE — PROGRESS NOTES
[facility] CORE Clinic - CardioMEMS Reading Review    February 9, 2023   CardioMEMS reviewed.  Current diuretic: Bumex 4 mg daily  Recent plan of care changes:  2/1/23 decreased Bumex to 2 mg daily for 2 days, then returned to 4 mg daily. Missed a few days readings, but last two days has been low of threshold with PAD of 2. Will review with provider.     Current Threshold parameters:         Today's Waveform:       Readings:               Rosina Mays RN

## 2023-02-09 NOTE — PROGRESS NOTES
Remote monitoring of Pulmonary Artery Pressures via CardioMEMS device reviewed at least weekly and as needed with CORE nursing. Diuretics adjusted accordingly.    Billing 12/30/22 through 1/28/23    Austin CORNEJO NP-C

## 2023-02-10 RX ORDER — BUMETANIDE 1 MG/1
TABLET ORAL
Qty: 270 TABLET | Refills: 3 | Status: SHIPPED | OUTPATIENT
Start: 2023-02-10 | End: 2023-05-31

## 2023-02-10 NOTE — PROGRESS NOTES
Called Mariel and Odalis again as they had not read mychart.   Reviewed changes with Odalis and she verbalized understanding.

## 2023-02-14 ENCOUNTER — PATIENT OUTREACH (OUTPATIENT)
Dept: NEPHROLOGY | Facility: CLINIC | Age: 78
End: 2023-02-14
Payer: MEDICARE

## 2023-02-14 NOTE — PROGRESS NOTES
Dr. Krishna would like to offer her CKD education class to all [facility] patients with a GFR of 45 or less. As the [facility] [place], Tammy will be sending out [employer] messages offering either the early or late class depending on the patient's GFR. Patients last GRF 31. Sent [employer] message.     Aileen Durant RN, BSN   Nephrology RN Care Coordinator   [facility]

## 2023-02-15 ENCOUNTER — PATIENT OUTREACH (OUTPATIENT)
Dept: CARE COORDINATION | Facility: CLINIC | Age: 78
End: 2023-02-15
Payer: MEDICARE

## 2023-02-15 NOTE — PROGRESS NOTES
Clinic Care Coordination Contact  [facility]       Clinical Data: Care Coordinator Outreach  Outreach attempted x 1. Spoke with Mariel Pham's roommate. She requested return call this afternoon.  Plan:  Care Coordinator will call back after lunch.     Addendum: RN CC called back after lunch. No answer. LM with contact information. Will attempt outreach again tomorrow.     Kayla Salazar RN, BSN, CPHN, CM  [facility] Ambulatory Care Management  [facility]  Phone: [phone]  Email: [email or web address]

## 2023-02-16 ENCOUNTER — CARE COORDINATION (OUTPATIENT)
Dept: CARDIOLOGY | Facility: CLINIC | Age: 78
End: 2023-02-16
Payer: MEDICARE

## 2023-02-16 NOTE — PROGRESS NOTES
[facility] CORE Clinic - CardioMEMS Reading Review    February 16, 2023   CardioMEMS reviewed.  Current diuretic: Bumex 4 mg daily  Recent plan of care changes: Bumex decreased to 3 mg daily last week. PAD now in goal range.     Current Threshold parameters:         Today's Waveform:       Readings:               Rosina Mays RN

## 2023-02-17 ENCOUNTER — PATIENT OUTREACH (OUTPATIENT)
Dept: CARE COORDINATION | Facility: CLINIC | Age: 78
End: 2023-02-17
Payer: MEDICARE

## 2023-02-17 NOTE — PROGRESS NOTES
Neph Tracking updated   Neph Tracking Flowsheet Last Filled Values     CKD Education Status Referred  Scheduled for Dr. Krishna Virtual Class 4/5/23 at 4pm    CKD Education Referral Date 02/14/23    CKD Education Other Dr. Krishna Class    Patient's Referral Dates Auto Populate Patient's Referral Dates    MTM Referral 11/17/22    Palliative Referral 9/22/22    Home Care Referral 2/10/22    Journey Referral 1/17/23        Aileen Durant RN, BSN   Nephrology RN Care Coordinator   [facility]

## 2023-02-17 NOTE — PROGRESS NOTES
Clinic Care Coordination Contact    Follow Up Progress Note      Assessment: RN CC contacted Vero (Mariel's roommate) for monthly outreach. She is still in a 2 day conference. No issues have come up recently. Mariel's primary care provider did add a new medication to her regiment -venlafaxine (EFFEXOR XR) 37.5 MG 24 hr capsule; Take 1 capsule (37.5 mg) by mouth daily. Vero asked RN CC to call them next month.      Care Gaps:    Health Maintenance Due   Topic Date Due     HF ACTION PLAN  11/30/2021     EYE EXAM  06/10/2022     URINE DRUG SCREEN  01/20/2023     Did not discuss    Care Plans  Care Plan: 1. Community Resources     Problem: Sufficient In-home support     Goal: In-home Support     Start Date: 9/20/2022 Expected End Date: 2/14/2023    This Visit's Progress: 50%    Priority: Medium    Note:     Barriers: Limited mobility, diagnoses of multiple, chronic, complex medical conditions; provider availability - wait time to complete appointments  Strengths: Motivated; agreeable to care coordination  Patient expressed understanding of goal: Yes  Action steps to achieve this goal:  1. I will review and research resources on home care services provided.   2. I will discuss, review, schedule and complete recommended overdue health maintenance with my primary care provider.   3. I will contact my care team with questions, concerns, support needs. I will use the clinic as a resource and I understand I can contact my clinic with 24/7 after hours services available. Care Coordinator will remain available as needed.     Annual assessment due: 4/2023                      Care Plan: Research into hospital bed         Intervention/Education provided during outreach: Intervention/Education provided during outreach: Discussed patients plan of care. CC RN asked open ended questions, provided support, resources, and encouragement as needed. Patient will reach out to care team sooner than planned with new questions or concerns.       Outreach Frequency: monthly    Plan: RN CC will continue to follow patient for any additional needs.     Care Coordinator will follow up in 1 month.     Kayla Salazar RN, BSN, CPHN, CM  [facility] Ambulatory Care Management  [facility]  Phone: [phone]  Email: [email or web address]

## 2023-02-22 ENCOUNTER — CARE COORDINATION (OUTPATIENT)
Dept: CARDIOLOGY | Facility: CLINIC | Age: 78
End: 2023-02-22
Payer: MEDICARE

## 2023-02-22 NOTE — PROGRESS NOTES
[facility] CORE Clinic - CardioMEMS Reading Review    February 22, 2023   CardioMEMS reviewed.  Current diuretic: Bumex 3 mg daily  Recent plan of care changes: Recently decrease Bumex baseline dose to 3 mg daily. PAD in goal range.     Current Threshold parameters:         Today's Waveform:       Readings:               Rosina Mays RN

## 2023-02-27 ENCOUNTER — MYC MEDICAL ADVICE (OUTPATIENT)
Dept: CARDIOLOGY | Facility: CLINIC | Age: 78
End: 2023-02-27
Payer: MEDICARE

## 2023-02-27 ENCOUNTER — ALLIED HEALTH/NURSE VISIT (OUTPATIENT)
Dept: CARDIOLOGY | Facility: CLINIC | Age: 78
End: 2023-02-27
Attending: INTERNAL MEDICINE
Payer: MEDICARE

## 2023-02-27 DIAGNOSIS — M25.512 PAIN IN JOINT OF LEFT SHOULDER: ICD-10-CM

## 2023-02-27 DIAGNOSIS — I50.32 CHRONIC DIASTOLIC HEART FAILURE (H): Chronic | ICD-10-CM

## 2023-02-27 DIAGNOSIS — I50.32 CHRONIC DIASTOLIC HEART FAILURE (H): Primary | ICD-10-CM

## 2023-02-27 PROCEDURE — 93264 REM MNTR WRLS P-ART PRS SNR: CPT | Performed by: NURSE PRACTITIONER

## 2023-02-27 RX ORDER — PREGABALIN 100 MG/1
CAPSULE ORAL
Qty: 180 CAPSULE | Refills: 1 | Status: SHIPPED | OUTPATIENT
Start: 2023-02-27 | End: 2023-09-14

## 2023-02-27 RX ORDER — METOPROLOL SUCCINATE 25 MG/1
TABLET, EXTENDED RELEASE ORAL
Qty: 135 TABLET | Refills: 3 | Status: SHIPPED | OUTPATIENT
Start: 2023-02-27 | End: 2024-01-01

## 2023-03-02 ENCOUNTER — CARE COORDINATION (OUTPATIENT)
Dept: CARDIOLOGY | Facility: CLINIC | Age: 78
End: 2023-03-02
Payer: MEDICARE

## 2023-03-02 NOTE — PROGRESS NOTES
[facility] CORE Clinic - CardioMEMS Reading Review    March 2, 2023   CardioMEMS reviewed.  Current diuretic: Bumex 4 mg daily  Recent plan of care changes:     Current Threshold parameters:         Today's Waveform:       Readings:               Rosina Mays RN

## 2023-03-03 NOTE — PROGRESS NOTES
[facility] Heart - C.O.R.E. Clinic:  CardioMems Routine Remote Evaluation     Dates: 1/29/23 through 2/27/23?   ??   Adjustments made in the last month:   1/30/23 - decreased Bumex to 2 mg daily for 2 days for low PAD  2/9/23 - decreased maintenance dose of Bumex to 3 mg daily  ?     These adjustments have been discussed with the patient/caregiver and they express verbal understanding.    Threshold Alert Settings:  Last 30 days:       Readings:            Future Appointments   Date Time Provider Department Center   4/5/2023  4:00 PM  CKD EDUCATOR [facility]   4/14/2023  1:20 PM Bharat Fitzgerald DPM [facility]   4/17/2023  2:30 PM UCSCMA2 [facility]   4/17/2023  3:00 PM Opal Weber MD [facility]   6/13/2023  3:00 PM Muriel Will, BOBY [facility]   ?

## 2023-03-08 ENCOUNTER — CARE COORDINATION (OUTPATIENT)
Dept: CARDIOLOGY | Facility: CLINIC | Age: 78
End: 2023-03-08
Payer: MEDICARE

## 2023-03-08 NOTE — PROGRESS NOTES
[facility] CORE Clinic - CardioMEMS Reading Review    March 8, 2023   CardioMEMS reviewed.  Current diuretic: Bumex 3 mg daily  Recent plan of care changes: none.     Current Threshold parameters:         Today's Waveform:       Readings:               Rosina Mays RN

## 2023-03-09 NOTE — PROGRESS NOTES
Remote monitoring of Pulmonary Artery Pressures via CardioMEMS device reviewed at least weekly and as needed with CORE nursing. Diuretics adjusted accordingly.    1/29/23-2/27/23- billing dates    Austin CORNEJO NP-C

## 2023-03-13 ENCOUNTER — CARE COORDINATION (OUTPATIENT)
Dept: CARDIOLOGY | Facility: CLINIC | Age: 78
End: 2023-03-13
Payer: MEDICARE

## 2023-03-13 NOTE — PROGRESS NOTES
[facility] CORE Clinic - CardioMEMS Reading Review    March 13, 2023   CardioMEMS reviewed.  Current diuretic: Bumex 3 mg daily  Recent plan of care changes:     Current Threshold parameters:         Today's Waveform:       Readings:         Debbi Alcantara RN

## 2023-03-14 DIAGNOSIS — D05.12 DUCTAL CARCINOMA IN SITU (DCIS) OF LEFT BREAST: ICD-10-CM

## 2023-03-14 RX ORDER — ANASTROZOLE 1 MG/1
1 TABLET ORAL DAILY
Qty: 90 TABLET | Refills: 3 | Status: SHIPPED | OUTPATIENT
Start: 2023-03-14 | End: 2024-01-01

## 2023-03-14 NOTE — TELEPHONE ENCOUNTER
"Anastrozole 1mg tab  Last prescribing provider: Dr. Weber on 3/3/22    Last clinic visit date: 10/3/22    Recommendations for requested medication (if none, N/A): 10/3/22, \"She declined surgical excision and is on anastrozole for left breast DCIS.  She has now been on anastrozole 2 years, 4 months.  She is tolerating it well.  Mammograms every 6 months for 2+ years were stable, therefore we are now resuming annual mammograms.\"    Any other pertinent information (if none, N/A): NA    Refilled: Y/N, if NO, why?    Routed to Dr. Weber.    "

## 2023-03-17 ENCOUNTER — PATIENT OUTREACH (OUTPATIENT)
Dept: CARE COORDINATION | Facility: CLINIC | Age: 78
End: 2023-03-17
Payer: MEDICARE

## 2023-03-17 NOTE — LETTER
[facility] Updated  Patient Centered Plan of Care  About Me:        Patient Name:  Mariel Ribeiro    YOB: 1945  Age:         77 year old   Destiney MRN:    [id number] Telephone Information:  Home Phone [phone]   Mobile [phone]       Address:  [address] [place] [address] Email address:  [email or web address]      Emergency Contact(s)    Name Relationship Lgl Grd Work Phone Home Phone Mobile Phone   1. KOSTAS PIERSON Roommate No [phone]   2. VITA ODOM Sister No  [phone]   3. ORTIZ,SAKSHI Friend No  [phone]           Primary language:  English     needed? No   [facility] [employer] [facility]:  [phone] op. 1  Other communication barriers:Glasses    Preferred Method of Communication:  Fabian  Current living arrangement: Other; I live in a private home (i live in a house with a roommate)    Mobility Status/ Medical Equipment: Independent w/Device    Health Maintenance  Health Maintenance Reviewed: Due/Overdue Did not discuss. Patient won't go out in current weather.     My Access Plan  Medical Emergency 911   Primary Clinic Line [facility] - [phone]   24 Hour Appointment Line [phone] or  [phone]) (toll-free)   24 Hour Nurse Line [phone] (toll-free)   Preferred Urgent Care [facility], [phone]     Preferred Hospital [facility]  [phone]     Preferred Pharmacy [id number] - [place], [place] - [address] AT [facility] [address]     Behavioral Health Crisis Line The National Suicide Prevention Lifeline at 1-351.825.5088 or Text/Call 448     My Care Team Members  Patient Care Team       Relationship Specialty Notifications Start End    Malika Wolf, APRN CNP PCP - General Nurse Practitioner Primary Care  2/7/22      Phone: [phone] Fax: [phone]         [address] [facility] [address] [place] [address]    Zeeshan Hutson MD MD Orthopedics  9/8/14      XXX RETIRED  [place] [address]    Jefry Hanson DPM  Podiatry  9/8/14     Phone: [phone] Fax: [phone]         [address] [facility] [address]    Tom Curz MD MD Neurology  5/12/15     Phone: [phone] Fax: [phone]         [facility] [address]    Rosina Kohler MD MD Family Medicine - Sports Medicine  11/21/16     Phone: [phone] Fax: [phone]         0 [facility] [address]    Jeffrey Roberson MD MD General Surgery  5/22/17     Phone: [phone] Fax: [phone]         [address] [facility] 195 [facility] [address]    Stephanie Wolf MD MD Cardiology  7/2/18     Phone: [phone] Fax: [phone]         [address] [facility] 508 [facility] [address]    Cathi Vaughn APRN CNP Nurse Practitioner Cardiology Admissions 8/24/18     [facility]    Phone: [phone] Pager: [phone] Fax: [phone]        [facility] [address]    Rosina Mays RN Nurse Coordinator Cardiology Admissions 8/24/18     [facility]    Phone: [phone] Fax: [phone]        Shayna Newell, RN Specialty Care Coordinator Cardiology Admissions 3/13/19     Phone: [phone]         Magdalena Hall PA-C Physician Assistant Cardiology Admissions 12/10/19     [facility]    Phone: [phone] Fax: [phone]         [address] [place] [address]    Opal Weber MD MD Breast Oncology Admissions 8/23/20     Phone: [phone] Fax: [phone]         [facility] [address]    Opal Weber MD Assigned Cancer Care Provider   10/23/20     Phone: [phone] Fax: [phone] [address] [facility] [address]    Dahlia Wolfe, BOBY  Physician Assistant Urology  12/22/20     Phone: [phone] Fax: [phone] [address] [facility] [address]    Radha Llanos, RN Diabetes Educator Diabetes Education  1/8/21     Phone: [phone] Fax: [phone]         [address] [address]    Muriel Will PA-C Physician Assistant Endocrinology, Diabetes, and Metabolism  2/10/21     Phone: [phone] Fax: [phone]         [address] [place] [address]    Muriel Will PA-C Assigned Endocrinology Provider   3/17/21     Phone: [phone] Fax: [phone]         [address] [place] [address]    Dahlia Wolfe PA-C Assigned OBGYN Provider   11/21/21     Phone: [phone] Fax: [phone] [address] [facility] [address]    Malika Wolf APRN CNP Assigned PCP   3/27/22     Phone: [phone] Fax: [phone]         [facility] [address] [place] [address]    Shreyas Cole, PhD Assigned Behavioral Health Provider   4/17/22     Phone: [phone] Fax: [phone]         [address] [place] [address]    Paula Hdz APRN CNP Nurse Practitioner Neurology  6/7/22     Phone: [phone] Fax: [phone]         [facility] [address]    Bharat Fitzgerald DPM Assigned Musculoskeletal Provider   7/9/22     Phone: [phone] Fax: [phone]          [facility] [address]    Allison Prather MD Assigned Pulmonology Provider   7/30/22     Phone: [phone] Fax: [phone]         [address] [address]    Kayla Salazar, RN Lead Care Coordinator  Admissions 8/29/22     Phone: [phone]         Gwendolyn Mobley PA-C Assigned Nephrology Provider   9/24/22     Phone: [phone] Fax: [phone]         3 [facility] [address]    Ronny Raines MD Assigned Palliative Care Provider   9/28/22     Phone: [phone]  Fax: [phone]         [address] [facility] [address] [address]    Ko Denney DO Assigned Neuroscience Provider   11/26/22     Phone: [phone] Fax: [phone]         [address] [facility] [address]    Aurora Sparrow, PharmD Pharmacist Pharmacist  12/2/22     Phone: [phone] Fax: [phone]         [address] [facility] [address]    Aurora Sparrow, PharmD Assigned MTM Pharmacist   12/10/22     Phone: [phone] Fax: [phone]         [address] [facility] [address]    Bharat Jha RN, RN Specialty Care Coordinator Nephrology All results, Admissions 12/21/22     Alex Goldman MD Assigned Heart and Vascular Provider   1/21/23     Phone: [phone] Fax: [phone]         [address] [facility] [address]            My Care Plans  Self Management and Treatment Plan  Care Plan  Care Plan: 1. Community Resources     Problem: Sufficient In-home support     Goal: In-home Support     Start Date: 9/20/2022 Expected End Date: 2/14/2023    This Visit's Progress: 90% Recent Progress: 50%    Priority: Medium    Note:     Barriers: Limited mobility, diagnoses of multiple, chronic, complex medical conditions; provider availability - wait time to complete appointments  Strengths: Motivated; agreeable to care coordination  Patient expressed understanding of goal: Yes  Action steps to achieve this goal:  1. I will review and research resources on home care services provided (has services coming and [facility]HA weekly for baths) Updated 3/17/23  2. I will discuss, review, schedule and complete recommended overdue health maintenance with my primary care provider.   3. I will contact my care team with questions, concerns, support needs. I will use the clinic as a resource and I understand I can contact my clinic with 24/7 after hours services available. Care Coordinator will remain available as needed.     Annual assessment  "due: 4/2023                      Care Plan: Research into hospital bed (On hold)            Action Plans on File:   Depression    Advance Care Plans/Directives Type:   Advanced Directive - On File      My Medical and Care Information  Current Medications and Allergies:    Current Outpatient Medications   Medication     anastrozole (ARIMIDEX) 1 MG tablet     acetaminophen (TYLENOL) 500 MG tablet     albuterol (PROAIR HFA/PROVENTIL HFA/VENTOLIN HFA) 108 (90 Base) MCG/ACT inhaler     aspirin (ASA) 81 MG EC tablet     atorvastatin (LIPITOR) 40 MG tablet     bumetanide (BUMEX) 1 MG tablet     Continuous Blood Gluc  (FREESTKickboard MARTY 14 DAY READER) JEZ     Continuous Blood Gluc Sensor (EcosiaSTYLE MARTY 14 DAY SENSOR) MISC     EPINEPHrine (ANY BX GENERIC EQUIV) 0.3 MG/0.3ML injection 2-pack     ferrous gluconate (FERGON) 324 (38 Fe) MG tablet     LANTUS SOLOSTAR 100 UNIT/ML soln     losartan (COZAAR) 25 MG tablet     metoprolol succinate ER (TOPROL XL) 25 MG 24 hr tablet     miconazole (MICATIN/MICRO GUARD) 2 % external powder     nitroGLYcerin (NITROSTAT) 0.4 MG sublingual tablet     nystatin (MYCOSTATIN) 014510 UNIT/GM external ointment     nystatin POWD     potassium chloride ER (KLOR-CON M) 10 MEQ CR tablet     pregabalin (LYRICA) 100 MG capsule     repaglinide (PRANDIN) 1 MG tablet     Skin Protectants, Misc. ([facility] AG TEXTILE 10\"X144\") SHEE     SYNTHROID 150 MCG tablet     tolterodine ER (DETROL LA) 2 MG 24 hr capsule     venlafaxine (EFFEXOR XR) 37.5 MG 24 hr capsule     No current facility-administered medications for this visit.     Facility-Administered Medications Ordered in Other Visits   Medication     sodium chloride (PF) 0.9% PF flush 10 mL     Allergies   Allergen Reactions     Contrast Dye Anaphylaxis     Fish Allergy Anaphylaxis     Iodine Anaphylaxis     Oxycodone Other (See Comments)     Severe suicidal tendencies on this medication     Tree Nuts [Nuts] Anaphylaxis     Tree nuts only (peanuts " ok)     Bactrim      Increased uric acid     Betadine [Povidone Iodine] Hives, Swelling and Difficulty breathing     Betadine     Combivent      Rash     Dulaglutide Other (See Comments)     Hx . Of thyroid cancer.     Fish      Lisinopril Other (See Comments)     Scr/grf severely reduced.      Penicillins Rash     Allopurinol Rash     Latex Rash     Wool Fiber Rash     Care Coordination Start Date: 4/6/2020   Frequency of Care Coordination: monthly     Form Last Updated: 03/17/2023

## 2023-03-17 NOTE — PROGRESS NOTES
Clinic Care Coordination Contact    Follow Up Progress Note      Assessment: RN CC contacted patient's roommate, Vero, for monthly outreach. Things are going well. Vero stated that patient has been taking her Tylenol more regularly and has better control of her coccyx pain. They haven't done any further research into a new bed due to the weather. Mariel is getting services weekly for baths and a caregiver comes in when Vero is away from home.     Care Gaps:    Health Maintenance Due   Topic Date Due     HF ACTION PLAN  11/30/2021     EYE EXAM  06/10/2022     URINE DRUG SCREEN  01/20/2023     MICROALBUMIN  03/19/2023     Did not discuss. Mariel won't go out during this weather.     Care Plans  Care Plan: 1. Community Resources     Problem: Sufficient In-home support     Goal: In-home Support     Start Date: 9/20/2022 Expected End Date: 2/14/2023    This Visit's Progress: 90% Recent Progress: 50%    Priority: Medium    Note:     Barriers: Limited mobility, diagnoses of multiple, chronic, complex medical conditions; provider availability - wait time to complete appointments  Strengths: Motivated; agreeable to care coordination  Patient expressed understanding of goal: Yes  Action steps to achieve this goal:  1. I will review and research resources on home care services provided (has services coming and [facility]HA weekly for baths) Updated 3/17/23  2. I will discuss, review, schedule and complete recommended overdue health maintenance with my primary care provider.   3. I will contact my care team with questions, concerns, support needs. I will use the clinic as a resource and I understand I can contact my clinic with 24/7 after hours services available. Care Coordinator will remain available as needed.     Annual assessment due: 4/2023                      Care Plan: Research into hospital bed (On hold)         Intervention/Education provided during outreach: Intervention/Education provided during outreach: Discussed patients plan  of care. CC RN asked open ended questions, provided support, resources, and encouragement as needed. Patient will reach out to care team sooner than planned with new questions or concerns.      Outreach Frequency: monthly    Plan: RN CC will continue to follow patient for any additional needs. Did discuss idea of maintenance to Vero. They will think about transitioning to maintenance or staying active with Care Coordination (for any other resource needs).     Care Coordinator will follow up in 1 month.     Kayla Salazar RN, BSN, CPHN, CM  [facility]  [facility]  Phone: [phone]  Email: [email or web address]

## 2023-03-22 ENCOUNTER — CARE COORDINATION (OUTPATIENT)
Dept: CARDIOLOGY | Facility: CLINIC | Age: 78
End: 2023-03-22
Payer: MEDICARE

## 2023-03-22 NOTE — PROGRESS NOTES
[facility] - CardioMEMS Reading Review    March 22, 2023   CardioMEMS reviewed.  Current diuretic: Bumex 3 mg daily  Recent plan of care changes: none. PAD overall in goal range.     Current Threshold parameters:         Today's Waveform:       Readings:               Rosina Mays RN

## 2023-03-29 ENCOUNTER — CARE COORDINATION (OUTPATIENT)
Dept: CARDIOLOGY | Facility: CLINIC | Age: 78
End: 2023-03-29
Payer: MEDICARE

## 2023-03-29 ENCOUNTER — ALLIED HEALTH/NURSE VISIT (OUTPATIENT)
Dept: CARDIOLOGY | Facility: CLINIC | Age: 78
End: 2023-03-29
Attending: NURSE PRACTITIONER
Payer: MEDICARE

## 2023-03-29 DIAGNOSIS — I50.32 CHRONIC DIASTOLIC HEART FAILURE (H): Primary | ICD-10-CM

## 2023-03-29 PROCEDURE — 93264 REM MNTR WRLS P-ART PRS SNR: CPT | Performed by: NURSE PRACTITIONER

## 2023-03-29 NOTE — PROGRESS NOTES
[facility]:  CardioMems Routine Remote Evaluation     Dates: 2/28/23 through 3/29/23?   ??   Adjustments made in the last month: No adjustments made. PAD stable. ?     No adjustments made this month.  Discussed with patient/caregiver and they will continue current plan of care.     Threshold Alert Settings:  Last 30 days:      Readings:                 Future Appointments   Date Time Provider Department Center   4/14/2023  1:20 PM Bharat Fitzgerald DPM [facility]   4/17/2023  2:30 PM [facility]   4/17/2023  3:00 PM Opal Weber MD [facility]   6/13/2023  3:00 PM Muriel Will PA-C [facility]   ?

## 2023-03-29 NOTE — PROGRESS NOTES
[facility] - CardioMEMS Reading Review    March 29, 2023   CardioMEMS reviewed.  Current diuretic: Bumex 3 mg daily  Recent plan of care changes: none. PAD in goal range.     Current Threshold parameters:         Today's Waveform:       Readings:               Rosina Mays RN

## 2023-03-30 NOTE — PROGRESS NOTES
Remote monitoring of Pulmonary Artery Pressures via CardioMEMS device reviewed at least weekly and as needed with [facility] nursing. Diuretics adjusted accordingly.    Dates- 2/28/23 through 3/29/23    Austin CORNEJO NP-C

## 2023-03-31 ENCOUNTER — MEDICAL CORRESPONDENCE (OUTPATIENT)
Dept: HEALTH INFORMATION MANAGEMENT | Facility: CLINIC | Age: 78
End: 2023-03-31
Payer: MEDICARE

## 2023-04-02 DIAGNOSIS — I50.32 CHRONIC DIASTOLIC HEART FAILURE (H): Chronic | ICD-10-CM

## 2023-04-05 ENCOUNTER — CARE COORDINATION (OUTPATIENT)
Dept: CARDIOLOGY | Facility: CLINIC | Age: 78
End: 2023-04-05
Payer: MEDICARE

## 2023-04-05 RX ORDER — POTASSIUM CHLORIDE 750 MG/1
20 TABLET, EXTENDED RELEASE ORAL 2 TIMES DAILY
Qty: 360 TABLET | Refills: 3 | Status: SHIPPED | OUTPATIENT
Start: 2023-04-05 | End: 2024-01-01

## 2023-04-05 RX ORDER — LOSARTAN POTASSIUM 25 MG/1
25 TABLET ORAL DAILY
Qty: 90 TABLET | Refills: 3 | Status: SHIPPED | OUTPATIENT
Start: 2023-04-05 | End: 2024-01-01

## 2023-04-05 NOTE — PROGRESS NOTES
[facility] - CardioMEMS Reading Review    April 5, 2023   CardioMEMS reviewed.  Current diuretic: Bumex 3 mg daily  Recent plan of care changes: none. PAD in goal range.     Current Threshold parameters:         Today's Waveform:       Readings:               Rosina Mays RN

## 2023-04-05 NOTE — TELEPHONE ENCOUNTER
LOSARTAN 25MG TABLETS  Last Written Prescription Date:   10/17/2022  Last Fill Quantity: 90,   # refills: 1  Last Office Visit :  10/19/2022  Future Office visit:  None  90 tabs, 3 Refills sent to pharm   Recent Labs   Lab Test 12/19/22  1607   CR 1.70*     Gwendolyn Mobley PA-C   12/21/2022 10:49 AM CST Back to Top      Please let her know Creatinine a little higher and calcium still high but no abnormal proteins noted. Scr likely elevated with bumex who cardiology is managing based off MEMs. No changes at this time from our standpoint other than to hydrate well. Continue to monitor blood pressure. Follow up as planned.      Thanks,   Gwendolyn        POTASSIUM CL MICRO 10MEQ ER TABS  Last Written Prescription Date:   10/17/2022  Last Fill Quantity: 360,   # refills: 3  Last Office Visit :  10/19/2022  Future Office visit:  None  360 tabs, 3 Refills sent to pharm

## 2023-04-12 ENCOUNTER — CARE COORDINATION (OUTPATIENT)
Dept: CARDIOLOGY | Facility: CLINIC | Age: 78
End: 2023-04-12
Payer: MEDICARE

## 2023-04-12 ENCOUNTER — MYC MEDICAL ADVICE (OUTPATIENT)
Dept: CARDIOLOGY | Facility: CLINIC | Age: 78
End: 2023-04-12

## 2023-04-12 NOTE — PROGRESS NOTES
[facility] - CardioMEMS Reading Review    April 12, 2023   CardioMEMS reviewed.  Current diuretic: Bumex 3 mg daily  Recent plan of care changes:     Current Threshold parameters:         Today's Waveform:     Readings:               Rosina Mays RN

## 2023-04-14 ENCOUNTER — OFFICE VISIT (OUTPATIENT)
Dept: ORTHOPEDICS | Facility: CLINIC | Age: 78
End: 2023-04-14
Payer: MEDICARE

## 2023-04-14 DIAGNOSIS — L60.2 LONG TOENAIL: ICD-10-CM

## 2023-04-14 DIAGNOSIS — I73.89 OTHER SPECIFIED PERIPHERAL VASCULAR DISEASES (H): ICD-10-CM

## 2023-04-14 DIAGNOSIS — L08.1 ERYTHRASMA: Primary | ICD-10-CM

## 2023-04-14 DIAGNOSIS — I73.9 PVD (PERIPHERAL VASCULAR DISEASE) (H): ICD-10-CM

## 2023-04-14 DIAGNOSIS — B35.1 OM (ONYCHOMYCOSIS): ICD-10-CM

## 2023-04-14 PROCEDURE — 99214 OFFICE O/P EST MOD 30 MIN: CPT | Performed by: PODIATRIST

## 2023-04-14 RX ORDER — CLINDAMYCIN PHOSPHATE 11.9 MG/ML
SOLUTION TOPICAL 2 TIMES DAILY
Qty: 30 ML | Refills: 0 | Status: ON HOLD | OUTPATIENT
Start: 2023-04-14 | End: 2023-09-07
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
## 2023-04-17 ENCOUNTER — PATIENT OUTREACH (OUTPATIENT)
Dept: CARE COORDINATION | Facility: CLINIC | Age: 78
End: 2023-04-17

## 2023-04-17 NOTE — PROGRESS NOTES
Clinic Care Coordination Contact  [facility]/Voicemail       Clinical Data: Care Coordinator Outreach  Outreach attempted x 1.  Left message on patient's voicemail with call back information and requested return call.  Plan: Care Coordinator will try to reach patient again in 1-2 business days.    Kayla Salazar RN, BSN, CPHN, CM  [facility]  [facility]  Phone: [phone]  Email: [email or web address]

## 2023-04-18 ENCOUNTER — PATIENT OUTREACH (OUTPATIENT)
Dept: CARE COORDINATION | Facility: CLINIC | Age: 78
End: 2023-04-18
Payer: MEDICARE

## 2023-04-18 NOTE — PROGRESS NOTES
Clinic Care Coordination Contact  [facility]       Clinical Data: Care Coordinator Outreach  Outreach attempted x 2. Spoke with patient's roommate and she  requested return call later in the week.  Plan:  Care Coordinator will call again on Friday, 4/21/23.    Kayla Salazar RN, BSN, CPHN, CM  [facility] Ambulatory Care Management  [facility]  Phone: [phone]  Email: [email or web address]

## 2023-04-19 ENCOUNTER — CARE COORDINATION (OUTPATIENT)
Dept: CARDIOLOGY | Facility: CLINIC | Age: 78
End: 2023-04-19
Payer: MEDICARE

## 2023-04-19 NOTE — PROGRESS NOTES
[facility] - CardioMEMS Reading Review    April 19, 2023   CardioMEMS reviewed.  Current diuretic: Bumex 3 mg daily  Recent plan of care changes: none. PAD in goal range     Current Threshold parameters:         Today's Waveform:       Readings:               Rosina Mays RN

## 2023-04-21 ENCOUNTER — PATIENT OUTREACH (OUTPATIENT)
Dept: CARE COORDINATION | Facility: CLINIC | Age: 78
End: 2023-04-21
Payer: MEDICARE

## 2023-04-21 NOTE — PROGRESS NOTES
Clinic Care Coordination Contact    Follow Up Progress Note      Assessment: RN CC contacted patient/roommate Odalis for scheduled follow up. Things are going well. Only change in medications is an additional of an antibiotic for a foot infection.     Care Gaps:    Health Maintenance Due   Topic Date Due     HF ACTION PLAN  11/30/2021     EYE EXAM  06/10/2022     URINE DRUG SCREEN  01/20/2023     MICROALBUMIN  03/19/2023     HEMOGLOBIN  06/19/2023     Not discussed during outreach.     Care Plans  Care Plan: 1. Community Resources     Problem: Sufficient In-home support     Goal: In-home Support  Completed 4/21/2023    Start Date: 9/20/2022 Expected End Date: 2/14/2023    Recent Progress: 90%    Priority: Medium    Note:     Barriers: Limited mobility, diagnoses of multiple, chronic, complex medical conditions; provider availability - wait time to complete appointments  Strengths: Motivated; agreeable to care coordination  Patient expressed understanding of goal: Yes  Action steps to achieve this goal:  1. I will review and research resources on home care services provided (has services coming and [facility]HA weekly for baths) Updated 3/17/23  2. I will discuss, review, schedule and complete recommended overdue health maintenance with my primary care provider.   3. I will contact my care team with questions, concerns, support needs. I will use the clinic as a resource and I understand I can contact my clinic with 24/7 after hours services available. Care Coordinator will remain available as needed.     Annual assessment due: 4/2023              Long-Range Goal: Establish adequate home support  Completed 11/21/2022    Start Date: 8/18/2022 Expected End Date: 12/30/2022    Note:     Goal Statement: I will get set up with home care for RN services, physical and occupational therapy in the next 1 month.   Date goal set: 9/20/22  Measure of Success: Enrollment in home care  Barriers: Home Care will end then private pay PCA will  be needed.   Strengths:Strong support system; engaged in care coordination.  Date to Achieve By:3/9/23  Patient expressed understanding of goal:yes    Patient has been discharged from home care. She plans to continue exercises ongoing.     Action steps to achieve this goal  1. I will accept home care services.   2. I will accept the help of Care Coordination to find a home care agency.                         Care Plan: Research into hospital bed (On hold)     Problem: Research into hospital bed  Resolved 1/18/2023    Goal: Complete research into obtaining a hospital bed.  Completed 12/20/2022    Start Date: 9/20/2022 Expected End Date: 3/20/2023    Recent Progress: 80%    Note:     Goal Statement: I will research what type of hospital bed can be obtained through private pay.    Date goal set: 9/20/22  Measure of Success: Obtaining a hospital bed.  Barriers:Purchasing new bed & removing old bed  Strengths:Strong support system; agreeable to care coordination  Date to Achieve By:3/20/23  Patient expressed understanding of goal:yes    Action steps to achieve this goal  1. I will research the styles of hospital beds available.   2. I will accept the help of Care Coordination to find additional resources/information on hospital beds.   3. I will ask Pallative care at our meeting 9/22/22 for information on hospital beds available for purchase or provided through Pallative services.      Patient decided to not get hospital bed. She has bars around bed; will look into getting a mattress with firm sides, so she won't slide off.                       Intervention/Education provided during outreach: Discussed patients plan of care. CC RN asked open ended questions, provided support, resources, and encouragement as needed. Patient will reach out to care team sooner than planned with new questions or concerns.      Outreach Frequency: 2 months    Plan: Patient has completed all goals with Clinic Care Coordination.  Chart has  been reviewed and maintenance  is approved.    Care Coordinator will follow up in 2 months.     Kayla Salazar RN, BSN, CPHN, CM  [facility] Ambulatory Care Management  [facility]  Phone: [phone]  Email: [email or web address]

## 2023-04-24 ENCOUNTER — MYC MEDICAL ADVICE (OUTPATIENT)
Dept: FAMILY MEDICINE | Facility: CLINIC | Age: 78
End: 2023-04-24
Payer: MEDICARE

## 2023-04-26 ENCOUNTER — CARE COORDINATION (OUTPATIENT)
Dept: CARDIOLOGY | Facility: CLINIC | Age: 78
End: 2023-04-26
Payer: MEDICARE

## 2023-04-26 NOTE — PROGRESS NOTES
Reviewed with Austin Miguel NP.   Date: 4/26/2023    Time of Call: 12:22 PM     Diagnosis:  HF     [ VORB ] Ordering provider: Austin Miguel NP  Order: Decrease Bumex to 2 mg daily for 3 days.      Order received by: Rosina Mays RN      Follow-up/additional notes: Reviewed with Odalis who verbalized understanding.

## 2023-04-26 NOTE — PROGRESS NOTES
[facility] CORE Clinic - CardioMEMS Reading Review    April 26, 2023   CardioMEMS reviewed.  Current diuretic: Bumex 3 mg daily  Recent plan of care changes: none. PAD low last couple of days. Will review with provider.     Current Threshold parameters:         Today's Waveform:     Readings:               Rosina Mays RN

## 2023-04-28 ENCOUNTER — ALLIED HEALTH/NURSE VISIT (OUTPATIENT)
Dept: CARDIOLOGY | Facility: CLINIC | Age: 78
End: 2023-04-28
Attending: NURSE PRACTITIONER
Payer: MEDICARE

## 2023-04-28 DIAGNOSIS — I50.32 CHRONIC DIASTOLIC HEART FAILURE (H): Primary | ICD-10-CM

## 2023-04-28 PROCEDURE — 93264 REM MNTR WRLS P-ART PRS SNR: CPT | Performed by: NURSE PRACTITIONER

## 2023-05-02 ENCOUNTER — CARE COORDINATION (OUTPATIENT)
Dept: CARDIOLOGY | Facility: CLINIC | Age: 78
End: 2023-05-02
Payer: MEDICARE

## 2023-05-02 NOTE — PROGRESS NOTES
[facility] CORE Clinic - CardioMEMS Reading Review    May 2, 2023   CardioMEMS reviewed.  Current diuretic: Bumex 3 mg daily  Recent plan of care changes:     Current Threshold parameters: PAD WNL        Today's Waveform:       Readings:               NOEL KIMBROUGH RN

## 2023-05-03 NOTE — PROGRESS NOTES
[facility] Heart - C.O.R.E. Clinic:  CardioMems Routine Remote Evaluation     Dates: 3/30/23 through 4/28/23?   ??   Adjustments made in the last month:   4/26/23 Decreased Bumex to 2 mg daily for 2 days. PAD otherwise in goal range. ?     These adjustments have been discussed with the patient/caregiver and they express verbal understanding.    Threshold Alert Settings:  Last 30 days:    Readings:          Future Appointments   Date Time Provider Department Center   5/31/2023 12:00 PM  LAB [facility]   5/31/2023 12:30 PM Rea Dominguez MD [facility]   6/13/2023  3:00 PM Muriel Will PA-C [facility]   7/14/2023  1:00 PM [facility]harat Fitzgerald DPM [facility]   ?

## 2023-05-04 NOTE — PROGRESS NOTES
Remote monitoring of Pulmonary Artery Pressures via CardioMEMS device reviewed at least weekly and as needed with CORE nursing. Diuretics adjusted accordingly.    Billing March 30 - April 28, 2023    Austin CORNEJO NP-C

## 2023-05-11 ENCOUNTER — CARE COORDINATION (OUTPATIENT)
Dept: CARDIOLOGY | Facility: CLINIC | Age: 78
End: 2023-05-11
Payer: MEDICARE

## 2023-05-11 NOTE — PROGRESS NOTES
[facility] CORE Clinic - CardioMEMS Reading Review    May 11, 2023   CardioMEMS reviewed.  Current diuretic: Bumex 3 mg daily  Recent plan of care changes: 4/26/23 reduced bumex to 2 mg daily for 3 days    Current Threshold parameters:         Today's Waveform:       Readings:               Rosina Mays RN

## 2023-05-16 ENCOUNTER — CARE COORDINATION (OUTPATIENT)
Dept: CARDIOLOGY | Facility: CLINIC | Age: 78
End: 2023-05-16
Payer: MEDICARE

## 2023-05-16 NOTE — PROGRESS NOTES
[facility] CORE Clinic - CardioMEMS Reading Review    May 16, 2023   CardioMEMS reviewed.  Current diuretic: Bumex 3 mg daily  Recent plan of care changes: 5/17 reduce Bumex to 2 mg for 3 days     Current Threshold parameters:         Today's Waveform:       Readings:               Debbi Alcantara RN

## 2023-05-17 ENCOUNTER — MYC MEDICAL ADVICE (OUTPATIENT)
Dept: CARDIOLOGY | Facility: CLINIC | Age: 78
End: 2023-05-17
Payer: MEDICARE

## 2023-05-18 NOTE — TELEPHONE ENCOUNTER
Called to follow up as had not read mychart message. Reviewed instructions with Odalis and she verbalized understanding. Rosina Mays RN

## 2023-05-24 ENCOUNTER — PRE VISIT (OUTPATIENT)
Dept: CARDIOLOGY | Facility: CLINIC | Age: 78
End: 2023-05-24
Payer: MEDICARE

## 2023-05-24 ENCOUNTER — CARE COORDINATION (OUTPATIENT)
Dept: CARDIOLOGY | Facility: CLINIC | Age: 78
End: 2023-05-24
Payer: MEDICARE

## 2023-05-24 DIAGNOSIS — I50.32 CHRONIC DIASTOLIC HEART FAILURE (H): Primary | ICD-10-CM

## 2023-05-24 NOTE — PROGRESS NOTES
[facility] CORE Clinic - CardioMEMS Reading Review    May 24, 2023   CardioMEMS reviewed.  Current diuretic: Bumex 3 mg daily  Recent plan of care changes: Last week did 3 day decrease of Bumex to 2 mg daily. Now back on 3 mg daily.     Current Threshold parameters:         Today's Waveform:       Readings:               Rosina Mays RN

## 2023-05-28 ENCOUNTER — ALLIED HEALTH/NURSE VISIT (OUTPATIENT)
Dept: CARDIOLOGY | Facility: CLINIC | Age: 78
End: 2023-05-28
Attending: NURSE PRACTITIONER
Payer: MEDICARE

## 2023-05-28 DIAGNOSIS — I50.32 CHRONIC DIASTOLIC HEART FAILURE (H): Primary | ICD-10-CM

## 2023-05-28 PROCEDURE — 93264 REM MNTR WRLS P-ART PRS SNR: CPT | Performed by: NURSE PRACTITIONER

## 2023-05-31 ENCOUNTER — OFFICE VISIT (OUTPATIENT)
Dept: CARDIOLOGY | Facility: CLINIC | Age: 78
End: 2023-05-31
Attending: STUDENT IN AN ORGANIZED HEALTH CARE EDUCATION/TRAINING PROGRAM
Payer: MEDICARE

## 2023-05-31 ENCOUNTER — LAB (OUTPATIENT)
Dept: LAB | Facility: CLINIC | Age: 78
End: 2023-05-31
Payer: MEDICARE

## 2023-05-31 ENCOUNTER — CARE COORDINATION (OUTPATIENT)
Dept: CARDIOLOGY | Facility: CLINIC | Age: 78
End: 2023-05-31

## 2023-05-31 VITALS
SYSTOLIC BLOOD PRESSURE: 156 MMHG | WEIGHT: 293 LBS | BODY MASS INDEX: 46.39 KG/M2 | HEART RATE: 58 BPM | DIASTOLIC BLOOD PRESSURE: 57 MMHG | OXYGEN SATURATION: 97 %

## 2023-05-31 DIAGNOSIS — I50.32 CHRONIC DIASTOLIC HEART FAILURE (H): ICD-10-CM

## 2023-05-31 DIAGNOSIS — I50.32 CHRONIC DIASTOLIC HEART FAILURE (H): Primary | ICD-10-CM

## 2023-05-31 DIAGNOSIS — I25.5 ISCHEMIC CARDIOMYOPATHY: ICD-10-CM

## 2023-05-31 DIAGNOSIS — E66.01 MORBID OBESITY (H): ICD-10-CM

## 2023-05-31 DIAGNOSIS — I10 HYPERTENSION GOAL BP (BLOOD PRESSURE) < 130/80: ICD-10-CM

## 2023-05-31 LAB
ALBUMIN SERPL BCG-MCNC: 3.9 G/DL (ref 3.5–5.2)
ALP SERPL-CCNC: 126 U/L (ref 35–104)
ALT SERPL W P-5'-P-CCNC: 20 U/L (ref 10–35)
ANION GAP SERPL CALCULATED.3IONS-SCNC: 9 MMOL/L (ref 7–15)
AST SERPL W P-5'-P-CCNC: 22 U/L (ref 10–35)
BILIRUB SERPL-MCNC: 0.3 MG/DL
BUN SERPL-MCNC: 42.1 MG/DL (ref 8–23)
CALCIUM SERPL-MCNC: 10.3 MG/DL (ref 8.8–10.2)
CHLORIDE SERPL-SCNC: 105 MMOL/L (ref 98–107)
CREAT SERPL-MCNC: 1.44 MG/DL (ref 0.51–0.95)
DEPRECATED HCO3 PLAS-SCNC: 27 MMOL/L (ref 22–29)
GFR SERPL CREATININE-BSD FRML MDRD: 37 ML/MIN/1.73M2
GLUCOSE SERPL-MCNC: 143 MG/DL (ref 70–99)
POTASSIUM SERPL-SCNC: 4.5 MMOL/L (ref 3.4–5.3)
PROT SERPL-MCNC: 6.6 G/DL (ref 6.4–8.3)
SODIUM SERPL-SCNC: 141 MMOL/L (ref 136–145)

## 2023-05-31 PROCEDURE — 36415 COLL VENOUS BLD VENIPUNCTURE: CPT | Performed by: PATHOLOGY

## 2023-05-31 PROCEDURE — G0463 HOSPITAL OUTPT CLINIC VISIT: HCPCS | Performed by: STUDENT IN AN ORGANIZED HEALTH CARE EDUCATION/TRAINING PROGRAM

## 2023-05-31 PROCEDURE — 99215 OFFICE O/P EST HI 40 MIN: CPT | Performed by: STUDENT IN AN ORGANIZED HEALTH CARE EDUCATION/TRAINING PROGRAM

## 2023-05-31 PROCEDURE — 80053 COMPREHEN METABOLIC PANEL: CPT | Performed by: PATHOLOGY

## 2023-05-31 RX ORDER — BUMETANIDE 1 MG/1
2 TABLET ORAL DAILY
Qty: 180 TABLET | Refills: 3 | Status: SHIPPED | OUTPATIENT
Start: 2023-05-31 | End: 2023-08-15

## 2023-05-31 RX ORDER — DAPAGLIFLOZIN 10 MG/1
10 TABLET, FILM COATED ORAL DAILY
Qty: 90 TABLET | Refills: 1 | Status: SHIPPED | OUTPATIENT
Start: 2023-05-31 | End: 2023-09-29

## 2023-05-31 ASSESSMENT — PAIN SCALES - GENERAL: PAINLEVEL: NO PAIN (0)

## 2023-05-31 NOTE — NURSING NOTE
Chief Complaint   Patient presents with     Follow Up     RTN HF: 76 year old female presents with chronic diastolic heart failure with labs prior     Vitals were taken and medications reconciled.    Rafi Goodson, EMT  12:23 PM

## 2023-05-31 NOTE — PROGRESS NOTES
[facility] CORE Clinic - CardioMEMS Reading Review    May 31, 2023   CardioMEMS reviewed.  Current diuretic: Bumex 3 mg daily  Recent plan of care changes: 5/17 decreased Bumex to 2 mg daily for 3 days. Now PAD in goal range.     Current Threshold parameters:         Today's Waveform:       Readings:               Rosina Mays RN

## 2023-05-31 NOTE — PATIENT INSTRUCTIONS
Cardiology Providers you saw during your visit: Dr. Deshpande     Medication changes:   -Start Farxiga 10 mg daily. This medication is $140 for 90 day supply. Let us know if this is affordable.   -When you start the Farxiga decrease Bumex to 2 mg daily.   -Lab in 2 weeks after starting Farxiga and decreasing Bumex.       Follow up:  -Follow-up with C[facility], Virtually, in 6 months with labs done locally prior.   -Follow-up with Dr. Dominguez, in person, in 1 year with labs prior.             Please call if you have :  1. Weight gain of more than 2 pounds in a day or 5 pounds in a week  2. Increased shortness of breath, swelling or bloating  3. Dizziness, lightheadedness   4. Any questions or concerns.       Follow the American Heart Association Diet and Lifestyle recommendations:  Limit saturated fat, trans fat, sodium, red meat, sweets and sugar-sweetened beverages. If you choose to eat red meat, compare labels and select the leanest cuts available.  Aim for at least 150 minutes of moderate physical activity or 75 minutes of vigorous physical activity - or an equal combination of both - each week.      During business hours: [phone], press option # 1 to schedule or leave a message for your care team      After hours, weekends or holidays: On Call Cardiologist- [phone]   option #4 and ask to speak to the on-call Cardiologist. Inform them you are a CORE/heart failure patient at the [place].      Heart Failure Support Group  Virtual meetings 2023 Monday, January 2nd 1-2 p.m.  Monday, February 6th 1-2 p.m.  Monday, March 6th, 1-2 p.m.  Monday, April 3rd 1-2 p.m.  Monday, May 1 st, 1-2 p.m.  Monday, July 3rd, 1-2 p.m,  Monday August 7th, 1-2 p.m.  Monday, September 11th, 1-2 p.m.  Monday, October 2nd, 1-2 pm  Monday, November 6th, 1-2pm  Monday, December 4th, 1-2pm      Stacey Neal RN   Cardiology Care Coordinator - Heart Failure/ C.O.R.E. Clinic  [facility]   Questions and scheduling:  "[phone]   [employer] press #1 for [employer] \"To send a message to your care team\"    "
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
## 2023-05-31 NOTE — NURSING NOTE
Labs: Patient was given results of the laboratory testing obtained today. Patient was instructed to return for the next laboratory testing in 1-2 weeks after starting Farxiga . Patient demonstrated understanding of this information and agreed to call with further questions or concerns.     New Medication:   Start Farxiga 10 mg daily. This medication is $140 for 90 day supply.   -When you start the Farxiga decrease Bumex to 2 mg daily.   Patient was educated regarding newly prescribed medication, including discussion of  the indication, administration, side effects, and when to report to MD or RN. Patient demonstrated understanding of this information and agreed to call with further questions or concerns.    Return Appointment:   -Follow-up with [facility], Virtually, in 6 months with labs done locally prior.   -Follow-up with Dr. Dominguez, in person, in 1 year with labs prior. Patient given instructions regarding scheduling next clinic visit. Patient demonstrated understanding of this information and agreed to call with further questions or concerns.    Patient stated she understood all health information given and agreed to call with further questions or concerns.    Stacey Neal RN
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
## 2023-06-01 NOTE — PROGRESS NOTES
[facility]:  CardioMems Routine Remote Evaluation     Dates: 4/29/23 through 5/28/23?   ??   Adjustments made in the last month:   5/18 decreased Bumex to 2 mg daily for 3 days for low PAD. ?     These adjustments have been discussed with the patient/caregiver and they express verbal understanding.    Threshold Alert Settings:  Last 30 days: 3-6      Readings:            Future Appointments   Date Time Provider Department Center   6/13/2023  3:00 PM Muriel Will PA-C [facility]   7/14/2023  1:00 PM Bharat Fitzgerald DPM [facility]   ?

## 2023-06-02 NOTE — PROGRESS NOTES
Video: 2:58  -     [facility]  Endocrinology  Muriel Will PA-C  06/13/2023      Chief Complaint:   Diabetes    History of Present Illness:   Mariel Ribeiro is a 75 year old female with a history of type II diabetes mellitus, kidney failure, TIA, CHF, CAD status post CABG, COPD, thyroid cancer status post thyroidectomy and resultant hypothyroidism, and hypertension who presents for  follow up of her diabetes.     She was diagnosed with type 2 diabetes mellitus in 2007. Initially managed with oral Metformin, Januvia, and Glimepiride, all have been discontinued due to declining renal fucntion. She was reasonably well managed with NPH 28 units bid and Prandin with meals when seen 10/31/2019.  I saw her as a hospital follow-up in December 2019 with BG above goal and advised increasing Lantus to 31 and if needed 33 units daily and continuing prandin with meals and advised she see CDE.    Other recent visit notes:    Feb 2021: Met with Mariel and roommate Odalis.   Newer eye glasses not working, concerns about cognition and referred for neurology.  Concerned about renal disease, BG at goal.  Discussed starting Empagliflozin in place of Prandin but did not.      Feb 2022: BG at goal with Lantus 32 u, bid Prandin with meals, and careful diet but with 4% hypoglycemia.  Advised to decrease her Lantus to 29 units daily and take her Lantus at the same time daily.    August 2022: Mariel just returned home from [facility] after hospitalization due to a fall and then rehab.  Her blood sugar was at goal without hypoglycemia and advised to continue Lantus 29 units with 1 mg of her Repaglinide with each meal.    Dec 2022: A1C at 7.5 on Lantus 29 units once daily and Repaglinide with breakfast.  Advised to try to take with dinner as long as no low BG.      Interim:  Cardiology reccommended Farxiga and she has been taking it about a week. Mariel feels like she is getting little spasms all over in her legs.  She also has a lesion on  her beast and will see provider next week.  Wondering if she will lose weight on Farxiga.  At baseline she has intermittent vaginal and inguinal yeast and uses Miconazole powder - not worse yet.        Today:  With Odalis today. Cardiology reccommended Farxiga and she has been taking it about a week. Mariel feels like she is getting little spasms all over in her legs.  She is now using a cane to be steady on her feet, though Odalis feels steadiness improving.  She also has a lesion on her beast and will see provider next week.  Wondering if she will lose weight on Farxiga.  At baseline she has intermittent vaginal and inguinal yeast and uses Miconazole powder - not worse yet.  Denies dysuria.  Drinking water. They continue to forget Glipizide at dinner often.      Current DM therapy:  Lantus 26 units daily   Prandin/ repaglinide:  1 mg twice daily with each of her meals.   -They reliably take with breakfast, but still less frequently with dinner.    Farxiga 10 mg qam        Blood Glucose Monitoring:        BG below goal without hypoglycemia.            Diabetes monitoring and complications:  CAD: Yes  Last eye exam results:Still need to reschedule (was while hospitalized.)   Microalbuminuria: 31.53 (5/22/2019) - followed by nephrology.   Neuropathy: Yes  HTN: Yes  On Statin: Yes  On Aspirin: Yes  Depression: Yes  Erectile dysfunction: N/A      PMH: reviewed - any significant changes noted above.  Meds: Reviewed and updated in module and above.  Allergies: Reviewed and where applicable updated in module.  SH: Pertinent items reviewed with patient and changes noted above.  FH: Pertinent reviewed with patient and noted above.    ROS: Brief review of symptoms reveals stable condition except where noted above.         Social History:   She lives with her roommate Odalis and her dog Rimma. Odalis is an  and still working form home.  They eat between 8 and 10 pm.    Tobacco Use: former smoker, 27 years, quit  "1989  Alcohol Use: none  Drug Use: none  PCP: Malika Wolf      Physical Exam:   There were no vitals taken for this visit.     Wt Readings from Last 10 Encounters:   05/31/23 134.4 kg (296 lb 3.2 oz)   12/06/22 140.6 kg (310 lb)   10/19/22 136.5 kg (301 lb)   10/12/22 136.8 kg (301 lb 9.6 oz)   09/13/22 (!) 158.8 kg (350 lb)   08/02/22 142.6 kg (314 lb 6.4 oz)   07/30/22 144.3 kg (318 lb 3.2 oz)   07/27/22 136.1 kg (300 lb)   07/27/22 142.4 kg (314 lb)   07/13/22 137.4 kg (303 lb)      General: Pleasant, well nourished and hydrated obese female seated in her home in [place].  Accompanied by Odalis.  Psych:  Mood is \"good,\" affect is appropriate.  Many questions are answered by Odalis, but Mariel also responds to a couple.  No display of insight or recollection observed.  HEENT: Eyes and sclera are clear. Extraocular movements are grossly intact without proptosis.  Nares are patent, mucous membranes moist.  Neck: No masses or JVD are noted.    Resp: Easy and unlabored breathing.   Neuro: Alert and oriented.  Voice is clear and articulate.  Ext: no swelling or edema     Data:  Lab Results   Component Value Date    GFRESTIMATED 37 (L) 05/31/2023    GFRESTIMATED 31 (L) 12/19/2022    GFRESTIMATED 37 (L) 10/19/2022    GFRESTBLACK 46 (L) 07/09/2021    GFRESTBLACK 39 (L) 04/29/2021    GFRESTBLACK 44 (L) 03/18/2021      Recent Labs   Lab Test 05/31/23  1201 12/19/22  1621 12/19/22  1607 10/19/22  0751 07/14/22  1537 07/13/22  1642 07/13/22  0619 07/12/22  1423 03/09/22  1304 01/20/22  1500 07/09/21  1700 04/29/21  1116 11/06/19  1233 10/31/19  0000 10/30/19  1537 10/01/19  1133 04/09/19 2050 04/03/19  0000   A1C  --   --  7.5*  --   --   --   --  7.2* 6.8*  --    < > 6.8*   < >  --   --   --    < >  --    HEMOGLOBINA1  --   --   --   --   --   --   --   --   --   --   --   --   --  7.2*  --   --   --  6.5*   TSH  --   --  1.37  --   --  1.98  --   --  3.56  --   --  7.12*  --   --   --  5.02*  --   --    T4  --   --   --   --   " --   --   --   --   --   --   --  0.87  --   --   --  0.97  --   --    LDL  --   --   --  101*  --   --   --   --  89  --   --  117*  --   --   --   --   --   --    HDL  --   --   --  54  --   --   --   --  57  --   --  57  --   --   --   --   --   --    TRIG  --   --   --  149  --   --   --   --  131  --   --  191*  --   --   --   --   --   --    CR 1.44*  --  1.70* 1.45*   < >  --    < > 1.25* 1.37*  --    < > 1.51*   < >  --    < > 1.59*   < >  --    MICROL  --  85.5  --   --   --   --   --   --   --  326  --   --    < >  --   --   --    < >  --     < > = values in this interval not displayed.         Assessment and Plan:  Type 2 diabetes mellitus with stage 3 chronic kidney disease, with long-term current use of insulin (H)    Mariel is a 78 year old female with type II diabetes complicated by TIA, CHF, CAD status post CABG, COPD, thyroid cancer status post thyroidectomy and resultant hypothyroidism, obesity, depression, chronic pain and hypertension.    Mariel continues to live at home with the support of Odalis.  Her blood sugar appears to be managed at goal without any hypoglycemia.  This has improved with the addition of Farxiga.        Plan:  Please continue:  Lantus 26 units daily   Prandin/ repaglinide:  1 mg with breakfast.  1 mg with dinner if includes a Blizzard or BG before dinner is >140.    Farxiga 10 mg each morning.    Please call lab to update your metabolic panel.    Next time you see Malika Wolf, inquire about transitioning back to her for diabetes management as your diabetes is well -controlled.    They request follow-up in 1 year, will plan to graduate from clinic at that time.        HTN/CKD:  She will continue to follow-up with nephrology.  Reminded to schedule labs since starting Farxiga/ If dose increased will need further decrease in Lantus and likely discontinue Prandin.          DM Complications-   Retinopathy:   Reminded to update eye exam.   nephropathy:  Yes, she will continue to see  Dr. Medina.  Neuropathy: Yes peripheral neuropathy.  She sees Dr. Fitzgerald in podiatry   feet: She sees Dr. Fitzgerald in podiatry   Lipids: Has CAD and is taking a statin.     Hypothyroidism: Her TSH was within normal range July 2022.  She will continue to take her current dose of levothyroxine.  We will update her dose again next summer.     Follow-up: Return in 12  months  - PCP can resume care if desired.    31 minutes in preparation for visit reviewing chart, labs and documentation, visiting with patient gathering history and in exam, education and counseling, as well as coordination of care, further chart review and documentation following visit on this date of service and as alluded to documented above.  Extended time and education regarding concerns with hypoglycemia.      It is my privilege to be involved in the care of the above patient.     Muriel Will PA-C, [facility]  [facility]  Diabetes, Endocrinology, and Metabolism  [phone] Appointments/Nurse  [phone] Fax  [phone] pager  [phone] nurse line                  Virtual Visit Details    Type of service:  Video Visit   Joined the call at 6/13/2023, 3:00:03 pm.  Left the call at 6/13/2023, 3:17:16 pm.  You were on the call for 17 minutes 13 seconds .    Originating Location (pt. Location): Home  Distant Location (provider location):  Off-site  Platform used for Video Visit: Well    Patient is showing 4/5 MNCM met. BP out range   Rea Berrios MA

## 2023-06-02 NOTE — PROGRESS NOTES
Remote monitoring of Pulmonary Artery Pressures via CardioMEMS device reviewed at least weekly and as needed with CORE nursing. Diuretics adjusted accordingly.    Billing dates 4/28/23-5/29/23    [facility]

## 2023-06-07 ENCOUNTER — CARE COORDINATION (OUTPATIENT)
Dept: CARDIOLOGY | Facility: CLINIC | Age: 78
End: 2023-06-07
Payer: MEDICARE

## 2023-06-13 ENCOUNTER — VIRTUAL VISIT (OUTPATIENT)
Dept: ENDOCRINOLOGY | Facility: CLINIC | Age: 78
End: 2023-06-13
Payer: MEDICARE

## 2023-06-13 DIAGNOSIS — R41.3 MEMORY LOSS: ICD-10-CM

## 2023-06-13 DIAGNOSIS — E66.01 MORBID OBESITY (H): Chronic | ICD-10-CM

## 2023-06-13 DIAGNOSIS — I50.32 CHRONIC DIASTOLIC HEART FAILURE (H): ICD-10-CM

## 2023-06-13 DIAGNOSIS — N18.30 TYPE 2 DIABETES MELLITUS WITH STAGE 3 CHRONIC KIDNEY DISEASE, WITH LONG-TERM CURRENT USE OF INSULIN, UNSPECIFIED WHETHER STAGE 3A OR 3B CKD (H): Primary | ICD-10-CM

## 2023-06-13 DIAGNOSIS — E89.0 POSTOPERATIVE HYPOTHYROIDISM: ICD-10-CM

## 2023-06-13 DIAGNOSIS — E11.22 TYPE 2 DIABETES MELLITUS WITH STAGE 3 CHRONIC KIDNEY DISEASE, WITH LONG-TERM CURRENT USE OF INSULIN, UNSPECIFIED WHETHER STAGE 3A OR 3B CKD (H): Primary | ICD-10-CM

## 2023-06-13 DIAGNOSIS — Z79.4 TYPE 2 DIABETES MELLITUS WITH STAGE 3 CHRONIC KIDNEY DISEASE, WITH LONG-TERM CURRENT USE OF INSULIN, UNSPECIFIED WHETHER STAGE 3A OR 3B CKD (H): Primary | ICD-10-CM

## 2023-06-13 PROCEDURE — 99214 OFFICE O/P EST MOD 30 MIN: CPT | Mod: VID | Performed by: PHYSICIAN ASSISTANT

## 2023-06-13 NOTE — PATIENT INSTRUCTIONS
Dear Mariel,  Your blood sugar looks excellent.   Please continue:  Lantus 26 units daily   Prandin/ repaglinide:  1 mg with breakfast.  1 mg with dinner if includes a Blizzard or BG before dinner is >140.    Farxiga 10 mg each morning.      Please call lab to update your metabolic panel.  Please contact us to schedule at any of our [place] lab locations  Call [phone] ([phone]), select option [phone].    For [facility] lab at [address] [place] [address], call : [phone].    Please be sure to UPDATE YOUR EYE EXAM each year.      Next time you see Malika Wolf, inquire about transitioning back to her for diabetes management as your diabetes is well -controlled.    My best wishes,    Muriel Will PA-C, MPAS  [facility]  Diabetes, Endocrinology, and Metabolism  [phone] Appointments/Nurse  [phone] Fax

## 2023-06-13 NOTE — LETTER
6/13/2023       RE: Mariel Ribeiro  [address] [place] [address]     Dear Colleague,    Thank you for referring your patient, Mariel Ribeiro, to the [facility] ENDOCRINOLOG[facility] [place] at [facility]. Please see a copy of my visit note below.      Video: 2:58  -     [facility]  Endocrinology  Muriel Wlil PA-C  06/13/2023      Chief Complaint:   Diabetes    History of Present Illness:   Mariel Ribeiro is a 75 year old female with a history of type II diabetes mellitus, kidney failure, TIA, CHF, CAD status post CABG, COPD, thyroid cancer status post thyroidectomy and resultant hypothyroidism, and hypertension who presents for  follow up of her diabetes.     She was diagnosed with type 2 diabetes mellitus in 2007. Initially managed with oral Metformin, Januvia, and Glimepiride, all have been discontinued due to declining renal fucntion. She was reasonably well managed with NPH 28 units bid and Prandin with meals when seen 10/31/2019.  I saw her as a hospital follow-up in December 2019 with BG above goal and advised increasing Lantus to 31 and if needed 33 units daily and continuing prandin with meals and advised she see CDE.    Other recent visit notes:    Feb 2021: Met with Mariel and roommate Odalis.   Newer eye glasses not working, concerns about cognition and referred for neurology.  Concerned about renal disease, BG at goal.  Discussed starting Empagliflozin in place of Prandin but did not.      Feb 2022: BG at goal with Lantus 32 u, bid Prandin with meals, and careful diet but with 4% hypoglycemia.  Advised to decrease her Lantus to 29 units daily and take her Lantus at the same time daily.    August 2022: Mariel just returned home from [facility] after hospitalization due to a fall and then rehab.  Her blood sugar was at goal without hypoglycemia and advised to continue Lantus 29 units with 1 mg of her Repaglinide with each meal.    Dec  2022: A1C at 7.5 on Lantus 29 units once daily and Repaglinide with breakfast.  Advised to try to take with dinner as long as no low BG.      Interim:  Cardiology reccommended Farxiga and she has been taking it about a week. Mariel feels like she is getting little spasms all over in her legs.  She also has a lesion on her beast and will see provider next week.  Wondering if she will lose weight on Farxiga.  At baseline she has intermittent vaginal and inguinal yeast and uses Miconazole powder - not worse yet.        Today:  With Odalis today. Cardiology reccommended Farxiga and she has been taking it about a week. Mariel feels like she is getting little spasms all over in her legs.  She is now using a cane to be steady on her feet, though Odalis feels steadiness improving.  She also has a lesion on her beast and will see provider next week.  Wondering if she will lose weight on Farxiga.  At baseline she has intermittent vaginal and inguinal yeast and uses Miconazole powder - not worse yet.  Denies dysuria.  Drinking water. They continue to forget Glipizide at dinner often.      Current DM therapy:  Lantus 26 units daily   Prandin/ repaglinide:  1 mg twice daily with each of her meals.   -They reliably take with breakfast, but still less frequently with dinner.    Farxiga 10 mg qam        Blood Glucose Monitoring:        BG below goal without hypoglycemia.            Diabetes monitoring and complications:  CAD: Yes  Last eye exam results:Still need to reschedule (was while hospitalized.)   Microalbuminuria: 31.53 (5/22/2019) - followed by nephrology.   Neuropathy: Yes  HTN: Yes  On Statin: Yes  On Aspirin: Yes  Depression: Yes  Erectile dysfunction: N/A      PMH: reviewed - any significant changes noted above.  Meds: Reviewed and updated in module and above.  Allergies: Reviewed and where applicable updated in module.  SH: Pertinent items reviewed with patient and changes noted above.  FH: Pertinent reviewed with patient  "and noted above.    ROS: Brief review of symptoms reveals stable condition except where noted above.         Social History:   She lives with her roommate Odalis and her dog Rimma. Odalis is an  and still working form home.  They eat between 8 and 10 pm.    Tobacco Use: former smoker, 27 years, quit 1989  Alcohol Use: none  Drug Use: none  PCP: Malika Wolf      Physical Exam:   There were no vitals taken for this visit.     Wt Readings from Last 10 Encounters:   05/31/23 134.4 kg (296 lb 3.2 oz)   12/06/22 140.6 kg (310 lb)   10/19/22 136.5 kg (301 lb)   10/12/22 136.8 kg (301 lb 9.6 oz)   09/13/22 (!) 158.8 kg (350 lb)   08/02/22 142.6 kg (314 lb 6.4 oz)   07/30/22 144.3 kg (318 lb 3.2 oz)   07/27/22 136.1 kg (300 lb)   07/27/22 142.4 kg (314 lb)   07/13/22 137.4 kg (303 lb)      General: Pleasant, well nourished and hydrated obese female seated in her home in [facility].  Accompanied by Odalis.  Psych:  Mood is \"good,\" affect is appropriate.  Many questions are answered by Odalis, but Mariel also responds to a couple.  No display of insight or recollection observed.  HEENT: Eyes and sclera are clear. Extraocular movements are grossly intact without proptosis.  Nares are patent, mucous membranes moist.  Neck: No masses or JVD are noted.    Resp: Easy and unlabored breathing.   Neuro: Alert and oriented.  Voice is clear and articulate.  Ext: no swelling or edema     Data:  Lab Results   Component Value Date    GFRESTIMATED 37 (L) 05/31/2023    GFRESTIMATED 31 (L) 12/19/2022    GFRESTIMATED 37 (L) 10/19/2022    GFRESTBLACK 46 (L) 07/09/2021    GFRESTBLACK 39 (L) 04/29/2021    GFRESTBLACK 44 (L) 03/18/2021      Recent Labs   Lab Test 05/31/23  1201 12/19/22  1621 12/19/22  1607 10/19/22  0751 07/14/22  1537 07/13/22  1642 07/13/22  0619 07/12/22  1423 03/09/22  1304 01/20/22  1500 07/09/21  1700 04/29/21  1116 11/06/19  1233 10/31/19  0000 10/30/19  1537 10/01/19  1133 04/09/19  2050 04/03/19  0000   A1C  --   --  7.5* "  --   --   --   --  7.2* 6.8*  --    < > 6.8*   < >  --   --   --    < >  --    HEMOGLOBINA1  --   --   --   --   --   --   --   --   --   --   --   --   --  7.2*  --   --   --  6.5*   TSH  --   --  1.37  --   --  1.98  --   --  3.56  --   --  7.12*  --   --   --  5.02*  --   --    T4  --   --   --   --   --   --   --   --   --   --   --  0.87  --   --   --  0.97  --   --    LDL  --   --   --  101*  --   --   --   --  89  --   --  117*  --   --   --   --   --   --    HDL  --   --   --  54  --   --   --   --  57  --   --  57  --   --   --   --   --   --    TRIG  --   --   --  149  --   --   --   --  131  --   --  191*  --   --   --   --   --   --    CR 1.44*  --  1.70* 1.45*   < >  --    < > 1.25* 1.37*  --    < > 1.51*   < >  --    < > 1.59*   < >  --    MICROL  --  85.5  --   --   --   --   --   --   --  326  --   --    < >  --   --   --    < >  --     < > = values in this interval not displayed.         Assessment and Plan:  Type 2 diabetes mellitus with stage 3 chronic kidney disease, with long-term current use of insulin (H)    Mariel is a 78 year old female with type II diabetes complicated by TIA, CHF, CAD status post CABG, COPD, thyroid cancer status post thyroidectomy and resultant hypothyroidism, obesity, depression, chronic pain and hypertension.    Mariel continues to live at home with the support of Odalis.  Her blood sugar appears to be managed at goal without any hypoglycemia.  This has improved with the addition of Farxiga.        Plan:  Please continue:  Lantus 26 units daily   Prandin/ repaglinide:  1 mg with breakfast.  1 mg with dinner if includes a Blizzard or BG before dinner is >140.    Farxiga 10 mg each morning.    Please call lab to update your metabolic panel.    Next time you see Malika Wolf, inquire about transitioning back to her for diabetes management as your diabetes is well -controlled.    They request follow-up in 1 year, will plan to graduate from clinic at that time.        HTN/CKD:   She will continue to follow-up with nephrology.  Reminded to schedule labs since starting Farxiga/ If dose increased will need further decrease in Lantus and likely discontinue Prandin.          DM Complications-   Retinopathy:   Reminded to update eye exam.   nephropathy:  Yes, she will continue to see Dr. Medina.  Neuropathy: Yes peripheral neuropathy.  She sees Dr. Fitzgerald in podiatry   feet: She sees Dr. Fitzgerald in podiatry   Lipids: Has CAD and is taking a statin.     Hypothyroidism: Her TSH was within normal range July 2022.  She will continue to take her current dose of levothyroxine.  We will update her dose again next summer.     Follow-up: Return in 12  months  - PCP can resume care if desired.    31 minutes in preparation for visit reviewing chart, labs and documentation, visiting with patient gathering history and in exam, education and counseling, as well as coordination of care, further chart review and documentation following visit on this date of service and as alluded to documented above.  Extended time and education regarding concerns with hypoglycemia.      It is my privilege to be involved in the care of the above patient.     Muriel Will PA-C, MPAS  [facility]  Diabetes, Endocrinology, and Metabolism  [phone] Appointments/Nurse  [phone] Fax  [phone] pager  [phone] nurse line                  Virtual Visit Details    Type of service:  Video Visit   Joined the call at 6/13/2023, 3:00:03 pm.  Left the call at 6/13/2023, 3:17:16 pm.  You were on the call for 17 minutes 13 seconds .    Originating Location (pt. Location): Home  Distant Location (provider location):  Off-site  Platform used for Video Visit: Guy    Patient is showing 4/5 MNCM met. BP out range   Rea Berrios MA

## 2023-06-13 NOTE — NURSING NOTE
Is the patient currently in the state of [place]? YES    Visit mode:VIDEO    If the visit is dropped, the patient can be reconnected by: TELEPHONE VISIT: Phone number: [phone]    Will anyone else be joining the visit? NO      How would you like to obtain your AVS? MyChart    Are changes needed to the allergy or medication list? YES: Lantus- 26 units daily     Reason for visit: SUZIE COX, CMA

## 2023-06-14 ENCOUNTER — CARE COORDINATION (OUTPATIENT)
Dept: CARDIOLOGY | Facility: CLINIC | Age: 78
End: 2023-06-14
Payer: MEDICARE

## 2023-06-14 NOTE — PROGRESS NOTES
[facility] - CardioMEMS Reading Review    June 14, 2023   CardioMEMS reviewed.  Current diuretic: Bumex 2 mg daily  Recent plan of care changes: added Jardiance last week.     Current Threshold parameters:         Today's Waveform:       Readings:               Rosina Mays RN
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
## 2023-06-15 ENCOUNTER — PATIENT OUTREACH (OUTPATIENT)
Dept: CARE COORDINATION | Facility: CLINIC | Age: 78
End: 2023-06-15

## 2023-06-15 ENCOUNTER — ONCOLOGY VISIT (OUTPATIENT)
Dept: ONCOLOGY | Facility: CLINIC | Age: 78
End: 2023-06-15
Attending: INTERNAL MEDICINE
Payer: MEDICARE

## 2023-06-15 ENCOUNTER — ANCILLARY PROCEDURE (OUTPATIENT)
Dept: MAMMOGRAPHY | Facility: CLINIC | Age: 78
End: 2023-06-15
Attending: INTERNAL MEDICINE
Payer: MEDICARE

## 2023-06-15 DIAGNOSIS — D05.12 DUCTAL CARCINOMA IN SITU (DCIS) OF LEFT BREAST: ICD-10-CM

## 2023-06-15 DIAGNOSIS — D05.12 DUCTAL CARCINOMA IN SITU (DCIS) OF LEFT BREAST: Primary | ICD-10-CM

## 2023-06-15 PROCEDURE — 99214 OFFICE O/P EST MOD 30 MIN: CPT | Performed by: PHYSICIAN ASSISTANT

## 2023-06-15 PROCEDURE — 77066 DX MAMMO INCL CAD BI: CPT | Performed by: STUDENT IN AN ORGANIZED HEALTH CARE EDUCATION/TRAINING PROGRAM

## 2023-06-15 PROCEDURE — G0279 TOMOSYNTHESIS, MAMMO: HCPCS | Performed by: STUDENT IN AN ORGANIZED HEALTH CARE EDUCATION/TRAINING PROGRAM

## 2023-06-15 PROCEDURE — 2894A US BREAST BILATERAL LIMITED 1-3 QUADRANTS: CPT | Mod: RT | Performed by: STUDENT IN AN ORGANIZED HEALTH CARE EDUCATION/TRAINING PROGRAM

## 2023-06-15 PROCEDURE — 76642 ULTRASOUND BREAST LIMITED: CPT | Mod: 50 | Performed by: STUDENT IN AN ORGANIZED HEALTH CARE EDUCATION/TRAINING PROGRAM
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
## 2023-06-15 NOTE — PROGRESS NOTES
Clinic Care Coordination Contact    Assessment: Care Coordinator contacted patient for 2 month follow up.  Patient has continued to follow the plan of care and assessment is negative for any new needs or concerns.    Enrollment status: Graduated.      Plan: No further outreaches at this time.  Patient will continue to follow the plan of care.  If new needs arise a new Care Coordination referral may be placed.  FYI to PCP    Kayla Salazar RN, BSN, CPHN, CM  [facility] Ambulatory Care Management  [facility]  Phone: [phone]  Email: [email or web address]

## 2023-06-16 ENCOUNTER — TELEPHONE (OUTPATIENT)
Dept: FAMILY MEDICINE | Facility: CLINIC | Age: 78
End: 2023-06-16
Payer: MEDICARE

## 2023-06-16 NOTE — TELEPHONE ENCOUNTER
Forms/Letter Request    Type of form/letter: Treatment/Order Update/Change in Condition (document ID) [id number]    Have you been seen for this request: N/A    Do we have the form/letter: Yes: placed in care team 2 provider sign folder    Who is the form from? Home care    Where did/will the form come from? form was faxed in    When is form/letter needed by: non given    How would you like the form/letter returned: Fax : [phone]    P

## 2023-06-16 NOTE — TELEPHONE ENCOUNTER
Forms/Letter Request    Type of form/letter: treatment /Order update/Change in condition (document ID) [id number]    Have you been seen for this request: N/A    Do we have the form/letter: Yes: placed in care team 2 provider sign folder    Who is the form from? Home care    Where did/will the form come from? form was faxed in    When is form/letter needed by: non given    How would you like the form/letter returned: Fax : [phone]

## 2023-06-22 ENCOUNTER — PATIENT OUTREACH (OUTPATIENT)
Dept: CARDIOLOGY | Facility: CLINIC | Age: 78
End: 2023-06-22
Payer: MEDICARE

## 2023-06-22 NOTE — PROGRESS NOTES
Called Mariel to check in as due for labs after decreasing Bumex and starting SGLT-2.     Left message asking them to call back to schedule labs and send updated cardiomems readings. Rosina Mays RN

## 2023-06-27 ENCOUNTER — ALLIED HEALTH/NURSE VISIT (OUTPATIENT)
Dept: CARDIOLOGY | Facility: CLINIC | Age: 78
End: 2023-06-27
Attending: NURSE PRACTITIONER
Payer: MEDICARE

## 2023-06-27 DIAGNOSIS — I50.32 CHRONIC DIASTOLIC HEART FAILURE (H): Primary | ICD-10-CM

## 2023-06-27 PROCEDURE — 93264 REM MNTR WRLS P-ART PRS SNR: CPT | Performed by: NURSE PRACTITIONER

## 2023-06-29 ENCOUNTER — CARE COORDINATION (OUTPATIENT)
Dept: CARDIOLOGY | Facility: CLINIC | Age: 78
End: 2023-06-29
Payer: MEDICARE

## 2023-06-29 NOTE — PROGRESS NOTES
[facility] CORE Clinic - CardioMEMS Reading Review    June 29, 2023   CardioMEMS reviewed.  Current diuretic: Bumex 2 mg daily  Recent plan of care changes:  Started Farxiga 5/30. Wanted labs after 2 weeks, have messaged via [facility] to schedule.     Current Threshold parameters:         Today's Waveform:       Readings:               Rosina Mays RN

## 2023-07-03 ENCOUNTER — LAB (OUTPATIENT)
Dept: LAB | Facility: CLINIC | Age: 78
End: 2023-07-03
Payer: MEDICARE

## 2023-07-03 DIAGNOSIS — E89.0 POSTOPERATIVE HYPOTHYROIDISM: ICD-10-CM

## 2023-07-03 DIAGNOSIS — I50.32 CHRONIC DIASTOLIC HEART FAILURE (H): ICD-10-CM

## 2023-07-03 LAB
ANION GAP SERPL CALCULATED.3IONS-SCNC: 10 MMOL/L (ref 7–15)
BUN SERPL-MCNC: 39.3 MG/DL (ref 8–23)
CALCIUM SERPL-MCNC: 10.3 MG/DL (ref 8.8–10.2)
CHLORIDE SERPL-SCNC: 106 MMOL/L (ref 98–107)
CREAT SERPL-MCNC: 1.51 MG/DL (ref 0.51–0.95)
DEPRECATED HCO3 PLAS-SCNC: 25 MMOL/L (ref 22–29)
GFR SERPL CREATININE-BSD FRML MDRD: 35 ML/MIN/1.73M2
GLUCOSE SERPL-MCNC: 111 MG/DL (ref 70–99)
POTASSIUM SERPL-SCNC: 4.4 MMOL/L (ref 3.4–5.3)
SODIUM SERPL-SCNC: 141 MMOL/L (ref 136–145)
TSH SERPL DL<=0.005 MIU/L-ACNC: 0.34 UIU/ML (ref 0.3–4.2)

## 2023-07-03 PROCEDURE — 84443 ASSAY THYROID STIM HORMONE: CPT

## 2023-07-03 PROCEDURE — 36415 COLL VENOUS BLD VENIPUNCTURE: CPT

## 2023-07-03 PROCEDURE — 80048 BASIC METABOLIC PNL TOTAL CA: CPT

## 2023-07-03 NOTE — PROGRESS NOTES
[facility] Heart - C.O.R.E. Clinic:  CardioMems Routine Remote Evaluation     Dates: 5/29/23 through 6/27/23    Adjustments made in the last month: Bumex decreased to 2 mg daily and Farxiga 10 mg daily started.     These adjustments have been discussed with the patient/caregiver and they express verbal understanding.    Threshold Alert Settings:  Last 30 days:       Readings:           Future Appointments   Date Time Provider Department Center   7/14/2023  1:00 PM Bharat Fitzgerald DPM [facility]

## 2023-07-07 ENCOUNTER — CARE COORDINATION (OUTPATIENT)
Dept: CARDIOLOGY | Facility: CLINIC | Age: 78
End: 2023-07-07
Payer: MEDICARE

## 2023-07-07 NOTE — PROGRESS NOTES
[facility] CORE Clinic - CardioMEMS Reading Review    July 7, 2023   CardioMEMS reviewed.  Current diuretic: Bumex 2 mg daily  Recent plan of care changes:      Current Threshold parameters: none. PAD in goal range.         Today's Waveform:       Readings:             Rosina Mays RN

## 2023-07-07 NOTE — PROGRESS NOTES
Remote monitoring of Pulmonary Artery Pressures via CardioMEMS device reviewed at least weekly and as needed with CORE nursing. Diuretics adjusted accordingly.      Billing dates 5/29/23 through 6/27/23    [facility]

## 2023-07-09 DIAGNOSIS — F33.41 RECURRENT MAJOR DEPRESSIVE DISORDER, IN PARTIAL REMISSION (H): Chronic | ICD-10-CM

## 2023-07-09 DIAGNOSIS — I50.32 CHRONIC DIASTOLIC HEART FAILURE (H): ICD-10-CM

## 2023-07-10 RX ORDER — VENLAFAXINE HYDROCHLORIDE 37.5 MG/1
37.5 CAPSULE, EXTENDED RELEASE ORAL DAILY
Qty: 90 CAPSULE | Refills: 1 | Status: SHIPPED | OUTPATIENT
Start: 2023-07-10 | End: 2023-08-17

## 2023-07-10 RX ORDER — VENLAFAXINE HYDROCHLORIDE 37.5 MG/1
37.5 CAPSULE, EXTENDED RELEASE ORAL DAILY
Qty: 90 CAPSULE | Refills: 1 | Status: SHIPPED | OUTPATIENT
Start: 2023-07-10 | End: 2023-07-10

## 2023-07-10 RX ORDER — VENLAFAXINE HYDROCHLORIDE 37.5 MG/1
CAPSULE, EXTENDED RELEASE ORAL
Qty: 90 CAPSULE | Refills: 1 | Status: SHIPPED | OUTPATIENT
Start: 2023-07-10 | End: 2023-07-10

## 2023-07-10 NOTE — TELEPHONE ENCOUNTER
"Routing refill request to provider for review/approval because:  Labs out of range:  Cr, bp, phq 9    Last Written Prescription Date:  2/7/23  Last Fill Quantity: 90,  # refills: 1   Last office visit provider:  2/7/23     Requested Prescriptions   Pending Prescriptions Disp Refills     venlafaxine (EFFEXOR XR) 37.5 MG 24 hr capsule [Pharmacy Med Name: VENLAFAXINE ER 37.5MG CAPSULES] 90 capsule 1     Sig: TAKE 1 CAPSULE(37.5 MG) BY MOUTH DAILY       Serotonin-Norepinephrine Reuptake Inhibitors  Failed - 7/9/2023  6:25 AM        Failed - Blood pressure under 140/90 in past 12 months     BP Readings from Last 3 Encounters:   05/31/23 (!) 156/57   12/26/22 136/56   10/19/22 (!) 162/92                 Failed - PHQ-9 score of less than 5 in past 6 months     Please review last PHQ-9 score.           Failed - Normal serum creatinine on file in past 12 months     Recent Labs   Lab Test 07/03/23  1427   CR 1.51*       Ok to refill medication if creatinine is low          Passed - Medication is active on med list        Passed - Patient is age 18 or older        Passed - No active pregnancy on record        Passed - No positive pregnancy test in past 12 months        Passed - Recent (6 mo) or future (30 days) visit within the authorizing provider's specialty     Patient had office visit in the last 6 months or has a visit in the next 30 days with authorizing provider or within the authorizing provider's specialty.  See \"Patient Info\" tab in inbasket, or \"Choose Columns\" in Meds & Orders section of the refill encounter.                 Odalis Mendoza RN 07/09/23 7:38 PM  "

## 2023-07-12 ENCOUNTER — CARE COORDINATION (OUTPATIENT)
Dept: CARDIOLOGY | Facility: CLINIC | Age: 78
End: 2023-07-12
Payer: MEDICARE

## 2023-07-12 NOTE — PROGRESS NOTES
[facility] CORE Clinic - CardioMEMS Reading Review    July 12, 2023   CardioMEMS reviewed.  Current diuretic: Bumex 2 mg daily  Recent plan of care changes:     Current Threshold parameters:         Today's Waveform:       Readings:               Rosina Mays RN

## 2023-07-13 RX ORDER — BUMETANIDE 1 MG/1
TABLET ORAL
Qty: 360 TABLET | OUTPATIENT
Start: 2023-07-13

## 2023-07-19 ENCOUNTER — CARE COORDINATION (OUTPATIENT)
Dept: CARDIOLOGY | Facility: CLINIC | Age: 78
End: 2023-07-19
Payer: MEDICARE

## 2023-07-19 NOTE — PROGRESS NOTES
[facility] CORE Clinic - CardioMEMS Reading Review    July 19, 2023   CardioMEMS reviewed.  Current diuretic: Bumex 2 mg daily  Recent plan of care changes: None     Current Threshold parameters:         Today's Waveform:       Readings:               Stacey Neal RN

## 2023-07-21 ENCOUNTER — PATIENT OUTREACH (OUTPATIENT)
Dept: CARDIOLOGY | Facility: CLINIC | Age: 78
End: 2023-07-21
Payer: MEDICARE

## 2023-07-21 NOTE — PROGRESS NOTES
Last CardioMEMs reading on 7/18.     Sent pt [employer] message asking her to send in CardioMEMs reading today.     Stacey Neal RN

## 2023-07-24 NOTE — PROGRESS NOTES
"Pt has not transmitted CardioMEMs readings since 7/18/23.     Called and spoke to pt roommate, Odalis. She is very apologetic and states they were \"naughty\" last week and didn't transmit much. She commits to having Mariel send in at least  2 readings this week.     .peamr    "

## 2023-07-25 ENCOUNTER — CARE COORDINATION (OUTPATIENT)
Dept: CARDIOLOGY | Facility: CLINIC | Age: 78
End: 2023-07-25
Payer: MEDICARE

## 2023-07-25 DIAGNOSIS — J41.0 SIMPLE CHRONIC BRONCHITIS (H): ICD-10-CM

## 2023-07-25 NOTE — PROGRESS NOTES
[facility] CORE Clinic - CardioMEMS Reading Review    July 25, 2023   CardioMEMS reviewed.  Current diuretic: Bumex 2 mg daily  Recent plan of care changes: None. Encouraged pt to send in reading at least twice per week.     Current Threshold parameters:         Today's Waveform:       Readings:               Stacey Neal RN

## 2023-07-27 ENCOUNTER — ALLIED HEALTH/NURSE VISIT (OUTPATIENT)
Dept: CARDIOLOGY | Facility: CLINIC | Age: 78
End: 2023-07-27
Attending: NURSE PRACTITIONER
Payer: MEDICARE

## 2023-07-27 DIAGNOSIS — I50.32 CHRONIC DIASTOLIC HEART FAILURE (H): Primary | ICD-10-CM

## 2023-07-27 PROCEDURE — 93264 REM MNTR WRLS P-ART PRS SNR: CPT | Performed by: NURSE PRACTITIONER

## 2023-07-28 RX ORDER — ALBUTEROL SULFATE 90 UG/1
AEROSOL, METERED RESPIRATORY (INHALATION)
Qty: 25.5 G | Refills: 9 | Status: ON HOLD | OUTPATIENT
Start: 2023-07-28 | End: 2024-01-01

## 2023-07-28 NOTE — TELEPHONE ENCOUNTER
"Routing refill request to provider for review/approval because:  A break in medication      Last Written Prescription Date:  9/21/21  Last Fill Quantity: 25.5,  # refills: 9   Last office visit provider:  2/7/23     Requested Prescriptions   Pending Prescriptions Disp Refills    PROAIR  (90 Base) MCG/ACT inhaler [Pharmacy Med Name: PROAIR HFA ORAL INH (200  PFS) 8.5G] 25.5 g 9     Sig: INHALE 2 PUFFS INTO THE LUNGS EVERY 6 HOURS       Asthma Maintenance Inhalers - Anticholinergics Failed - 7/25/2023 12:32 PM        Failed - Recent (12 mo) or future (30 days) visit within the authorizing provider's specialty     Patient has had an office visit with the authorizing provider or a provider within the authorizing providers department within the previous 12 mos or has a future within next 30 days. See \"Patient Info\" tab in inbasket, or \"Choose Columns\" in Meds & Orders section of the refill encounter.              Passed - Patient is age 12 years or older        Passed - Medication is active on med list       Short-Acting Beta Agonist Inhalers Protocol  Failed - 7/25/2023 12:32 PM        Failed - Recent (12 mo) or future (30 days) visit within the authorizing provider's specialty     Patient has had an office visit with the authorizing provider or a provider within the authorizing providers department within the previous 12 mos or has a future within next 30 days. See \"Patient Info\" tab in inbasket, or \"Choose Columns\" in Meds & Orders section of the refill encounter.              Passed - Patient is age 12 or older        Passed - Medication is active on med list             Odalis Mendoza RN 07/27/23 7:40 PM  "

## 2023-08-02 NOTE — PROGRESS NOTES
[facility] Heart - C.O.R.E. Clinic:  CardioMems Routine Remote Evaluation     Dates: 6/28/23 through 7/27/23    Adjustments made in the last month: No changes this month, PAD in goal range.     No adjustments made this month.  Discussed with patient/caregiver and they will continue current plan of care.     Threshold Alert Settings:  Last 30 days:        Readings:        Future Appointments   Date Time Provider Department Center   6/17/2024  2:30 PM Opal Weber MD [facility]

## 2023-08-03 ENCOUNTER — MEDICAL CORRESPONDENCE (OUTPATIENT)
Dept: HEALTH INFORMATION MANAGEMENT | Facility: CLINIC | Age: 78
End: 2023-08-03
Payer: MEDICARE

## 2023-08-03 ENCOUNTER — CARE COORDINATION (OUTPATIENT)
Dept: CARDIOLOGY | Facility: CLINIC | Age: 78
End: 2023-08-03
Payer: MEDICARE

## 2023-08-03 ENCOUNTER — MYC MEDICAL ADVICE (OUTPATIENT)
Dept: CARDIOLOGY | Facility: CLINIC | Age: 78
End: 2023-08-03
Payer: MEDICARE

## 2023-08-03 NOTE — PROGRESS NOTES
[facility] CORE Clinic - CardioMEMS Reading Review    August 3, 2023   CardioMEMS reviewed.  Current diuretic: Bumex 2 mg daily  Recent plan of care changes: none.  PAD at 7 two of last three days, will have her send another reading tomorrow.     Current Threshold parameters:         Today's Waveform:       Readings:               Rosina Mays RN

## 2023-08-04 NOTE — PROGRESS NOTES
Remote monitoring of Pulmonary Artery Pressures via CardioMEMS device reviewed at least weekly and as needed with CORE nursing. Diuretics adjusted accordingly.     Billing dates - 6/28/23 through 7/27/23     [place] [facility]

## 2023-08-10 ENCOUNTER — CARE COORDINATION (OUTPATIENT)
Dept: CARDIOLOGY | Facility: CLINIC | Age: 78
End: 2023-08-10
Payer: MEDICARE

## 2023-08-10 NOTE — PROGRESS NOTES
[facility] CORE Clinic - CardioMEMS Reading Review    August 10, 2023   CardioMEMS reviewed.  Current diuretic: Bumex 2 mg daily  Recent plan of care changes: none. PAD in goal range.     Current Threshold parameters:         Today's Waveform:       Readings:               Rosina Mays RN

## 2023-08-11 ENCOUNTER — CARE COORDINATION (OUTPATIENT)
Dept: CARDIOLOGY | Facility: CLINIC | Age: 78
End: 2023-08-11
Payer: MEDICARE

## 2023-08-11 NOTE — PROGRESS NOTES
[facility] CORE Clinic - CardioMEMS Reading Review    August 11, 2023   CardioMEMS reviewed.  Current diuretic: Bumex 2 mg daily  Recent plan of care changes:     Current Threshold parameters:         Today's Waveform:       Readings:               Rosina Mays RN

## 2023-08-11 NOTE — PROGRESS NOTES
Date: 8/11/2023    Time of Call: 3:46 PM     Diagnosis:  HF     [ VORB ] Ordering provider: Austin Miguel NP  Order: Bumex increase to 3 mg daily for 2 days, recheck mems on Monday     Order received by: Rosina Mays RN      Follow-up/additional notes: Reviewed with Odalis. She said she's glad we called because Mariel's legs have been more swollen lately. She is wondering if we'll need more than 2 days of an increase.   Will do the increase for 2 days and then I told her we would check in on Monday to see if the increase helped with swelling.

## 2023-08-14 NOTE — PROGRESS NOTES
I called Mariel to check in. I spoke to Odalis, her roommate. Mariel's Bumex was increased to 3 mg daily for 2 days. Odalis reports that Mariel has been urinating a lot, but she is still swollen and uncomfortable. She always has shortness of breath, and this is her baseline. Mariel has been checking her weight at home, but they are unsure how accurate the scale is. Weight the last 3 days is 315, 304, 296. Odalis did want to note that Mariel used to take Bumex 3 mg daily as her prescribed dose, but when Farxiga was started a few months ago, Bumex was decreased to 2 mg daily.    Debbi Alcantara RN

## 2023-08-15 ENCOUNTER — CARE COORDINATION (OUTPATIENT)
Dept: CARDIOLOGY | Facility: CLINIC | Age: 78
End: 2023-08-15
Payer: MEDICARE

## 2023-08-15 DIAGNOSIS — I50.32 CHRONIC DIASTOLIC HEART FAILURE (H): Primary | ICD-10-CM

## 2023-08-15 RX ORDER — BUMETANIDE 1 MG/1
3 TABLET ORAL DAILY
Qty: 270 TABLET | Refills: 3 | Status: SHIPPED | OUTPATIENT
Start: 2023-08-15 | End: 2024-01-01

## 2023-08-15 NOTE — PROGRESS NOTES
[facility] - CardioMEMS Reading Review    August 15, 2023   CardioMEMS reviewed.  Current diuretic: Bumex 2 mg daily  Recent plan of care changes: Mariel still has increased swelling. Per Austin, increase Bumex to 3 mg daily and BMP on Friday. Appointment scheduled for labs with Mariel's roommate, Odalis.    Current Threshold parameters:       Today's Waveform:       Readings:         Debbi Alcantara RN         Date: 8/15/2023    Time of Call: 4:37 PM     Diagnosis:  hypervolemia     [ TORB ] Ordering provider: Austin Miguel NP  Order: Bumex 3 mg daily, BMP on Friday     Order received by: Debbi Alcantara RN

## 2023-08-16 ENCOUNTER — TELEPHONE (OUTPATIENT)
Dept: FAMILY MEDICINE | Facility: CLINIC | Age: 78
End: 2023-08-16
Payer: MEDICARE

## 2023-08-16 NOTE — TELEPHONE ENCOUNTER
Pain in left leg increasing. Lymphedema.     Mood swings/difficulty copying. Would like to possibly increase venlafaxine.     Roommate calling in for earlier appointment.   Writer scheduled patient for tomorrow with Malika Wolf.     PILAR Escobar RN  [facility]     Counseling Text: I recommended taking nicotinamide or niacinamide, also known as vitamin B3, twice daily. Recent evidence suggests that taking vitamin B3 (500 mg twice daily) can reduce the risk of actinic keratoses and non-melanoma skin cancers. Stressed avoiding niacin. Detail Level: Detailed

## 2023-08-17 ENCOUNTER — VIRTUAL VISIT (OUTPATIENT)
Dept: FAMILY MEDICINE | Facility: CLINIC | Age: 78
End: 2023-08-17
Payer: MEDICARE

## 2023-08-17 DIAGNOSIS — F33.41 RECURRENT MAJOR DEPRESSIVE DISORDER, IN PARTIAL REMISSION (H): Chronic | ICD-10-CM

## 2023-08-17 DIAGNOSIS — I50.31 ACUTE HEART FAILURE WITH PRESERVED EJECTION FRACTION (HFPEF) (H): ICD-10-CM

## 2023-08-17 DIAGNOSIS — I10 HYPERTENSION GOAL BP (BLOOD PRESSURE) < 130/80: Chronic | ICD-10-CM

## 2023-08-17 DIAGNOSIS — I89.0 LYMPHEDEMA: Chronic | ICD-10-CM

## 2023-08-17 DIAGNOSIS — Z79.4 TYPE 2 DIABETES MELLITUS WITH STAGE 3B CHRONIC KIDNEY DISEASE, WITH LONG-TERM CURRENT USE OF INSULIN (H): Chronic | ICD-10-CM

## 2023-08-17 DIAGNOSIS — N18.32 TYPE 2 DIABETES MELLITUS WITH STAGE 3B CHRONIC KIDNEY DISEASE, WITH LONG-TERM CURRENT USE OF INSULIN (H): Chronic | ICD-10-CM

## 2023-08-17 DIAGNOSIS — B37.2 CANDIDIASIS OF SKIN: ICD-10-CM

## 2023-08-17 DIAGNOSIS — M79.89 PAIN AND SWELLING OF LEFT LOWER EXTREMITY: Primary | ICD-10-CM

## 2023-08-17 DIAGNOSIS — G89.4 CHRONIC PAIN SYNDROME: ICD-10-CM

## 2023-08-17 DIAGNOSIS — E11.22 TYPE 2 DIABETES MELLITUS WITH STAGE 3B CHRONIC KIDNEY DISEASE, WITH LONG-TERM CURRENT USE OF INSULIN (H): Chronic | ICD-10-CM

## 2023-08-17 DIAGNOSIS — M79.605 PAIN AND SWELLING OF LEFT LOWER EXTREMITY: Primary | ICD-10-CM

## 2023-08-17 PROCEDURE — 99214 OFFICE O/P EST MOD 30 MIN: CPT | Mod: 95 | Performed by: NURSE PRACTITIONER

## 2023-08-17 RX ORDER — VENLAFAXINE HYDROCHLORIDE 75 MG/1
75 CAPSULE, EXTENDED RELEASE ORAL DAILY
Qty: 90 CAPSULE | Refills: 1 | Status: SHIPPED | OUTPATIENT
Start: 2023-08-17 | End: 2024-01-01

## 2023-08-17 RX ORDER — VENLAFAXINE HYDROCHLORIDE 37.5 MG/1
75 CAPSULE, EXTENDED RELEASE ORAL DAILY
Qty: 90 CAPSULE | Refills: 1 | Status: SHIPPED | OUTPATIENT
Start: 2023-08-17 | End: 2023-08-17

## 2023-08-17 RX ORDER — NYSTATIN 10B UNIT
1 POWDER (EA) MISCELLANEOUS 4 TIMES DAILY
Qty: 1 EACH | Refills: 1 | Status: ON HOLD | OUTPATIENT
Start: 2023-08-17 | End: 2024-01-01

## 2023-08-17 NOTE — PROGRESS NOTES
Mariel is a 78 year old who is being evaluated via a billable video visit.      How would you like to obtain your AVS? MyChart  If the video visit is dropped, the invitation should be resent by: Text to cell phone: [phone]  Will anyone else be joining your video visit? Odalis (room mate)           Assessment & Plan     Pain and swelling of left lower extremity  Suspect secondary to her HF, but we will rule out DVT.  Defer management of bumex to cardiology.  Advised to wrap leg in ACE until she can fit into her stocking.  Keep extremity elevate whenever in a seated position, avoid letting leg stay in dependent position for long periods of time.  They are also open to Lymph therapy.  - US Lower Extremity Venous Duplex Left; Future  - BNP-N terminal pro; Future    Candidiasis of skin   Stable, tolerating med, no changes at this time.  Refills provided  - nystatin POWD; 1 Dose 4 times daily    Recurrent major depressive disorder, in partial remission (H)  Not at goal, will increase effexor today.  - venlafaxine (EFFEXOR XR) 75 MG 24 hr capsule; Take 1 capsule (75 mg) by mouth daily    Hypertension goal BP (blood pressure) < 130/80   Stable, tolerating med, no changes at this time  - Albumin Random Urine Quantitative with Creat Ratio; Future    Lymphedema  See above  - US Lower Extremity Venous Duplex Left; Future  - BNP-N terminal pro; Future  - Lymphedema Therapy Referral; Future    Acute heart failure with preserved ejection fraction (HFpEF) (H)  Following with cards.  Will get BNP for monitoring purposes.  - BNP-N terminal pro; Future    Type 2 diabetes mellitus with stage 3b chronic kidney disease, with long-term current use of insulin (H)   Stable, tolerating med, no changes at this time  - Albumin Random Urine Quantitative with Creat Ratio; Future  - CBC with platelets; Future  - Hemoglobin A1c; Future    Chronic pain syndrome  - [id number] - Urine Drug Confirmation Panel (Comprehensive); Future             BMI:  "  Estimated body mass index is 46.39 kg/m  as calculated from the following:    Height as of 9/13/22: 1.702 m (5' 7\").    Weight as of 5/31/23: 134.4 kg (296 lb 3.2 oz).   Weight management plan: Discussed healthy diet and exercise guidelines        CHANTAL Grace CNP  [facility]    Breana Floyd is a 78 year old, presenting for the following health issues:  Leg Problem (Leg swelling/pain), Recheck Medication (Med adjustment: bumetanide (BUMEX) 1 MG tablet), and Referral (Physical therapy )        8/17/2023    12:41 PM   Additional Questions   Roomed by Sofía CORTES   Leg swelling/pain  Duration: a couple weeks           Left leg pain, ongoing for 2 weeks.  No trauma.  Did a slip out bed.  It is swollen.  Cards increased Bumex to 3 tabs a day.  Can't get her compression stocking on.  Elevating once a day.      Moods, anxiety and depression are an issue.  Hoping to increase her venlafaxine.      Review of Systems   Constitutional, HEENT, cardiovascular, pulmonary, gi and gu systems are negative, except as otherwise noted.      Objective           Vitals:  No vitals were obtained today due to virtual visit.    Physical Exam   GENERAL: Healthy, alert and no distress  EYES: Eyes grossly normal to inspection.  No discharge or erythema, or obvious scleral/conjunctival abnormalities.  RESP: No audible wheeze, cough, or visible cyanosis.  No visible retractions or increased work of breathing.    SKIN: Visible skin clear. No significant rash, abnormal pigmentation or lesions.  NEURO: Cranial nerves grossly intact.  Mentation and speech appropriate for age.  PSYCH: Mentation appears normal, affect normal/bright, judgement and insight intact, normal speech and appearance well-groomed.                Video-Visit Details    Type of service:  Video Visit   Video Start Time:  1250  Video End Time: 1303    Originating Location (pt. Location): Home    Distant Location (provider location):  " On-site  Platform used for Video Visit: Guy

## 2023-08-20 ENCOUNTER — HEALTH MAINTENANCE LETTER (OUTPATIENT)
Age: 78
End: 2023-08-20

## 2023-08-21 ENCOUNTER — ANCILLARY PROCEDURE (OUTPATIENT)
Dept: ULTRASOUND IMAGING | Facility: CLINIC | Age: 78
End: 2023-08-21
Attending: NURSE PRACTITIONER
Payer: MEDICARE

## 2023-08-21 ENCOUNTER — PATIENT OUTREACH (OUTPATIENT)
Dept: CARDIOLOGY | Facility: CLINIC | Age: 78
End: 2023-08-21

## 2023-08-21 ENCOUNTER — LAB (OUTPATIENT)
Dept: LAB | Facility: CLINIC | Age: 78
End: 2023-08-21
Payer: MEDICARE

## 2023-08-21 DIAGNOSIS — E11.22 TYPE 2 DIABETES MELLITUS WITH STAGE 3B CHRONIC KIDNEY DISEASE, WITH LONG-TERM CURRENT USE OF INSULIN (H): Chronic | ICD-10-CM

## 2023-08-21 DIAGNOSIS — I89.0 LYMPHEDEMA: Chronic | ICD-10-CM

## 2023-08-21 DIAGNOSIS — I50.32 CHRONIC DIASTOLIC HEART FAILURE (H): ICD-10-CM

## 2023-08-21 DIAGNOSIS — I50.31 ACUTE HEART FAILURE WITH PRESERVED EJECTION FRACTION (HFPEF) (H): ICD-10-CM

## 2023-08-21 DIAGNOSIS — G89.4 CHRONIC PAIN SYNDROME: ICD-10-CM

## 2023-08-21 DIAGNOSIS — I10 HYPERTENSION GOAL BP (BLOOD PRESSURE) < 130/80: Chronic | ICD-10-CM

## 2023-08-21 DIAGNOSIS — M79.605 PAIN AND SWELLING OF LEFT LOWER EXTREMITY: ICD-10-CM

## 2023-08-21 DIAGNOSIS — Z79.4 TYPE 2 DIABETES MELLITUS WITH STAGE 3B CHRONIC KIDNEY DISEASE, WITH LONG-TERM CURRENT USE OF INSULIN (H): Chronic | ICD-10-CM

## 2023-08-21 DIAGNOSIS — M79.89 PAIN AND SWELLING OF LEFT LOWER EXTREMITY: ICD-10-CM

## 2023-08-21 DIAGNOSIS — N18.32 TYPE 2 DIABETES MELLITUS WITH STAGE 3B CHRONIC KIDNEY DISEASE, WITH LONG-TERM CURRENT USE OF INSULIN (H): Chronic | ICD-10-CM

## 2023-08-21 LAB
ANION GAP SERPL CALCULATED.3IONS-SCNC: 12 MMOL/L (ref 7–15)
BUN SERPL-MCNC: 37.4 MG/DL (ref 8–23)
CALCIUM SERPL-MCNC: 10.3 MG/DL (ref 8.8–10.2)
CHLORIDE SERPL-SCNC: 103 MMOL/L (ref 98–107)
CREAT SERPL-MCNC: 1.63 MG/DL (ref 0.51–0.95)
CREAT UR-MCNC: 39.9 MG/DL
CREAT UR-MCNC: 41 MG/DL
DEPRECATED HCO3 PLAS-SCNC: 28 MMOL/L (ref 22–29)
ERYTHROCYTE [DISTWIDTH] IN BLOOD BY AUTOMATED COUNT: 13.6 % (ref 10–15)
GFR SERPL CREATININE-BSD FRML MDRD: 32 ML/MIN/1.73M2
GLUCOSE SERPL-MCNC: 164 MG/DL (ref 70–99)
HBA1C MFR BLD: 7.6 %
HCT VFR BLD AUTO: 40.1 % (ref 35–47)
HGB BLD-MCNC: 12.7 G/DL (ref 11.7–15.7)
MCH RBC QN AUTO: 28.3 PG (ref 26.5–33)
MCHC RBC AUTO-ENTMCNC: 31.7 G/DL (ref 31.5–36.5)
MCV RBC AUTO: 90 FL (ref 78–100)
MICROALBUMIN UR-MCNC: 66.4 MG/L
MICROALBUMIN/CREAT UR: 166.42 MG/G CR (ref 0–25)
NT-PROBNP SERPL-MCNC: 983 PG/ML (ref 0–1800)
PLATELET # BLD AUTO: 154 10E3/UL (ref 150–450)
POTASSIUM SERPL-SCNC: 4 MMOL/L (ref 3.4–5.3)
RBC # BLD AUTO: 4.48 10E6/UL (ref 3.8–5.2)
SODIUM SERPL-SCNC: 143 MMOL/L (ref 136–145)
WBC # BLD AUTO: 7.5 10E3/UL (ref 4–11)

## 2023-08-21 PROCEDURE — 80048 BASIC METABOLIC PNL TOTAL CA: CPT | Performed by: PATHOLOGY

## 2023-08-21 PROCEDURE — 85027 COMPLETE CBC AUTOMATED: CPT | Performed by: PATHOLOGY

## 2023-08-21 PROCEDURE — 80354 DRUG SCREENING FENTANYL: CPT | Performed by: NURSE PRACTITIONER

## 2023-08-21 PROCEDURE — 80346 BENZODIAZEPINES1-12: CPT | Performed by: NURSE PRACTITIONER

## 2023-08-21 PROCEDURE — 82570 ASSAY OF URINE CREATININE: CPT | Performed by: NURSE PRACTITIONER

## 2023-08-21 PROCEDURE — 99000 SPECIMEN HANDLING OFFICE-LAB: CPT | Performed by: PATHOLOGY

## 2023-08-21 PROCEDURE — 83036 HEMOGLOBIN GLYCOSYLATED A1C: CPT | Performed by: NURSE PRACTITIONER

## 2023-08-21 PROCEDURE — 83880 ASSAY OF NATRIURETIC PEPTIDE: CPT | Performed by: PATHOLOGY

## 2023-08-21 PROCEDURE — 93971 EXTREMITY STUDY: CPT | Mod: LT | Performed by: RADIOLOGY

## 2023-08-21 PROCEDURE — 36415 COLL VENOUS BLD VENIPUNCTURE: CPT | Performed by: PATHOLOGY

## 2023-08-21 NOTE — TELEPHONE ENCOUNTER
[facility] - CardioMEMS Reading Review    August 21, 2023   CardioMEMS reviewed.  Current diuretic: Bumex 2 mg daily  Recent plan of care changes: Has been on increased dose of Bumex 3 mg daily for increased PAD. Getting labs today.     Called to check in on leg swelling. Left message and asked for call back.     Current Threshold parameters:         Today's Waveform:       Readings:               Rosina Mays RN

## 2023-08-21 NOTE — PROGRESS NOTES
Call back from Odalis.   Left leg is still more swollen than normal, R is much better. Ultrasound negative for clot (ordered by primary)  PCP referred to Lymphedema clinic at [facility] - they are going to see her.     Weight at home - hasn't weighed as scale not accurate. Going to try a new battery.   Increase to Bumex 3 mg daily helped a little bit. Wondering if they need to increase further.   Will review with provider.

## 2023-08-22 NOTE — PROGRESS NOTES
Reviewed with Austin Miguel NP.   Date: 8/22/2023    Time of Call: 4:52 PM     Diagnosis:  HF     [ VORB ] Ordering provider: Austin Miguel NP  Order: Increase Bumex to 4 mg for 3 days.      Order received by: Rosina Mays RN      Follow-up/additional notes:    Left voicemail and mychart message sent.

## 2023-08-23 LAB — PREGABALIN UR QL CFM: PRESENT

## 2023-08-23 NOTE — PROGRESS NOTES
Reviewed with Austin Miguel NP. With cardiomems coming down will plan for only 1 day increase.   Date: 8/23/2023    Time of Call: 1:33 PM     Diagnosis:  HF     [ VORB ] Ordering provider: Austin Miguel nP  Order: Only do Bumex 4 mg increase for one day, then decrease back to 3 mg daily.      Order received by: Rosina Mays RN      Follow-up/additional notes:

## 2023-08-24 NOTE — PROGRESS NOTES
Clinic Care Coordination Contact    Patient continues home care services.  Patient is closely followed by the [facility] and home care services .  Patient reports she was just switched to Lantus per Endocrinologist and states she doesn't feel good on the Latus  Blood sugars all over the place   Last night blood sugar reading was 115 and this morning 215.  Patient will keep reporting blood sugar readings to Endocrinology office   CC will continue to collaborate with the home care staff     Senia Durán RN, Care Coordinator   [facility] -Care Coordination  Doug Christine  [phone]     · Patient has had recurrent hematuria during his hospitalization requiring CBI  · He is s/p cysto with stent after which which hematuria resolved, however once Eliquis was restarted patient had recurrent hematuria  · Urine currently without visible hematuria however UA consistent with microscopic hematuria  · Eliquis was briefly held then restarted  · Cont to monitor for recurrence

## 2023-08-25 ENCOUNTER — TELEPHONE (OUTPATIENT)
Dept: FAMILY MEDICINE | Facility: CLINIC | Age: 78
End: 2023-08-25
Payer: MEDICARE

## 2023-08-25 NOTE — TELEPHONE ENCOUNTER
Forms/Letter Request    Type of form/letter:  Home Health Certification from 07/29/2023 to 09/26/2023    Have you been seen for this request: N/A    Do we have the form/letter: Yes: Placed in care team 2    Who is the form from? Home care    Where did/will the form come from? form was faxed in    When is form/letter needed by: as soon as able    How would you like the form/letter returned: Fax : [phone]
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
## 2023-08-26 ENCOUNTER — ALLIED HEALTH/NURSE VISIT (OUTPATIENT)
Dept: CARDIOLOGY | Facility: CLINIC | Age: 78
End: 2023-08-26
Attending: NURSE PRACTITIONER
Payer: MEDICARE

## 2023-08-26 DIAGNOSIS — I50.32 CHRONIC DIASTOLIC HEART FAILURE (H): Primary | ICD-10-CM

## 2023-08-26 PROCEDURE — 93264 REM MNTR WRLS P-ART PRS SNR: CPT | Performed by: NURSE PRACTITIONER

## 2023-08-28 ENCOUNTER — THERAPY VISIT (OUTPATIENT)
Dept: PHYSICAL THERAPY | Facility: CLINIC | Age: 78
End: 2023-08-28
Attending: NURSE PRACTITIONER
Payer: MEDICARE

## 2023-08-28 DIAGNOSIS — I89.0 LYMPHEDEMA: Chronic | ICD-10-CM

## 2023-08-28 PROCEDURE — 97535 SELF CARE MNGMENT TRAINING: CPT | Mod: GP | Performed by: PHYSICAL THERAPIST

## 2023-08-28 PROCEDURE — 97161 PT EVAL LOW COMPLEX 20 MIN: CPT | Mod: GP | Performed by: PHYSICAL THERAPIST

## 2023-08-28 PROCEDURE — 97110 THERAPEUTIC EXERCISES: CPT | Mod: GP | Performed by: PHYSICAL THERAPIST

## 2023-08-28 NOTE — PROGRESS NOTES
"PHYSICAL THERAPY EVALUATION  Type of Visit: Evaluation    See electronic medical record for Abuse and Falls Screening details.    Subjective       Presenting condition or subjective complaint: lymphedema  Date of onset: 07/28/23 (Chronic but worsened such that she was not able to use her compression velcro wraps.)    Relevant medical history: COPD; Concussions; Fibromyalgia; Migraines or headaches; Overweight; Thyroid problems   Dates & types of surgery: most recent was CABG in 2018 using L LE vein, B TKA    Prior diagnostic imaging/testing results:       Prior therapy history for the same diagnosis, illness or injury: Yes did therapy with foam and wraps a long time ago.  Using the velcro wraps for the calf all the time. Pt will shower with assist 1x/week and change pairs.      Prior Level of Function  Bed mobility: assistance to get legs fully into bed.  Transfers: Assistive equipment  Ambulation: Assistive equipment  ADL: Assistive person  IADL:  assist for all IADLs, pt does not participate in much    Living Environment  Social support: With a significant other or spouse   Type of home: House   Stairs to enter the home: Yes 2 Is there a railing: Yes   Ramp: No   Stairs inside the home: No       Help at home: Self Cares (home health aide/personal care attendant, family, etc)  Equipment owned: Straight Cane; Four-point cane; Walker with wheels; Bedrail; Grab bars; Raised toilet seat; Bath bench     Employment: No    Hobbies/Interests: art    Patient goals for therapy: many things, pain and walking difficulties    Pain assessment: Pain present  Location: L knee and the bulge (lower leg)/Rating: \"beyond a 10point scale\"  Location: R knee/Rating: \"not too bad\"     Objective       EDEMA EVALUATION  Additional history:  Body part affected by edema: B LE, L < R  If cancer related, treatment:    If not cancer related, problems with veins or cause of swelling: not sure, started quite a while ago but worsening over the past " few months  Distance able to walk: 50', household  Time able to stand: probably a few minutes.  Had PT at home last summer which helped  Sensation problems in hands/feet: Yes Neuropathy from DM  Edema etiology: Surgery, Unknown, positioning, decreased mobility, obesity    Cognitive Status Examination  Orientation: Oriented to person, place and time   Level of Consciousness: Alert but sleepy, partner answers more questions  Follows Commands and Answers Questions: 50% of the time  Personal Safety and Judgement:  too sleepyto assess  Memory:  unable to assess    EDEMA  Skin Condition: Intact, Non-pitting, Temperature L LE is much larger than R, warm, skin is stretched but not skin changes noted. Pt does have brown marks, possible old bruises on both legs.  Feet less involved. Pt does have lipedema characteristics in from buttock to knees and also in arms.   Scar: Yes  B TKA  Stemmer Sign: +  Ulceration: No    GIRTH MEASUREMENTS: Refer to separate girth measurement flowsheet.     VOLUME LE  Right LE (mL) 3825.23   Left LE (mL) 5900.34   LE Volume Comparison LLE volume greater than RLE volume   % Difference 54.3%   Head/Neck Volume     Trunk Volume Proximal LE edema with medial tight lobules L>R.   Genital Volume       RANGE OF MOTION:  Decreased knee flexion bilaterally, decreased but functional ankle ROM  STRENGTH:  Decreased Bilaterally, pt needing assist for positioning in bed  POSTURE: Sitting Posture: Rounded shoulders, Forward head  PALPATION: Pt is tender on the R LE  ACTIVITIES OF DAILY LIVING: gets help from Od[facility] for dressing, does own bathroom cares, assist from health aid for shower 1x/week  BED MOBILITY: Mod Assist  TRANSFERS: Contact Guard  GAIT/LOCOMOTION: slow, wide GEORGINA with cane for 10', needs encouragement  BALANCE: not formally assessed, pt would benefit from use of walker or supervision with all mobility  SENSATION: diminished light touch      Assessment & Plan   CLINICAL IMPRESSIONS  Medical  Diagnosis: Lymphedema    Treatment Diagnosis: Lymphedema L LE   Impression/Assessment: Patient is a 78 year old female with pain and edema complaints.  The following significant findings have been identified: Pain, Decreased ROM/flexibility, Decreased strength, Impaired balance, Impaired sensation, Inflammation, Edema, Impaired gait, and Decreased activity tolerance. These impairments interfere with their ability to perform self care tasks, household chores, household mobility, and community mobility as compared to previous level of function. Pt spouse/roommate reports that plan would be to improve edema and then do home therapy as getting out to therapy is difficult and pt is often tired for participation    Clinical Decision Making (Complexity):  Clinical Presentation: Evolving/Changing  Clinical Presentation Rationale: based on medical and personal factors listed in PT evaluation  Clinical Decision Making (Complexity): Moderate complexity    PLAN OF CARE  Treatment Interventions:  Interventions: Manual Therapy, Therapeutic Exercise, Self-Care/Home Management, home program    Long Term Goals     PT Goal 1  Goal Identifier: Lymphedema Home Program  Goal Description: Pt, with help of caregiver, will be independent with drainage exercise and positioning to best manage swelling to decrease symptoms.  Target Date: 10/26/23  PT Goal 2  Goal Identifier: Volume  Goal Description: Pt will have 10% reduction in L LE volume for improved tissue integrity, decreased risk of infection, and decreased pain.  Target Date: 10/26/23  PT Goal 3  Goal Identifier: LLIS  Goal Description: Pt will have at least 7 point reduction in score on subsequent assessment to reflect the MCID in impact of swelling on quality of life.  Goal Progress: initial: 49  PT Goal 4  Goal Identifier: Maintenance  Goal Description: Pt will use compression garments as instructed AND home management tools/program for long term management of chronic condition to  prevent tissue fibrosis, infection, wound and other secondary sequelae  Target Date: 11/25/23      Frequency of Treatment: 4 visits over 90 days as needed  Duration of Treatment: 3 months    Recommended Referrals to Other Professionals: Occupational Therapy, Physical Therapy, (home therapies)  Education Assessment:   Learner/Method: Patient;Caregiver  Education Comments: plan, home management goals    Risks and benefits of evaluation/treatment have been explained.   Patient/Family/caregiver agrees with Plan of Care.     Evaluation Time:     PT Eval, Low Complexity Minutes (37573): 20     Signing Clinician: Eden Owen, PT      [facility]                                                                                   OUTPATIENT PHYSICAL THERAPY      PLAN OF TREATMENT FOR OUTPATIENT REHABILITATION   Patient's Last Name, First Name, Mariel Green YOB: 1945   Provider's Name   [facility]   Medical Record No.  [id number]     Onset Date: 07/28/23 (Chronic but worsened such that she was not able to use her compression velcro wraps.)  Start of Care Date: 08/28/23     Medical Diagnosis:  Lymphedema      PT Treatment Diagnosis:  Lymphedema L LE Plan of Treatment  Frequency/Duration: 4 visits over 90 days as needed/ 3 months    Certification date from 08/28/23 to 11/25/23         See note for plan of treatment details and functional goals     Eden Owen, PT                         I CERTIFY THE NEED FOR THESE SERVICES FURNISHED UNDER        THIS PLAN OF TREATMENT AND WHILE UNDER MY CARE     (Physician attestation of this document indicates review and certification of the therapy plan).                Referring Provider:  Malika Wolf      Initial Assessment  See Epic Evaluation- Start of Care Date: 08/28/23

## 2023-08-30 ENCOUNTER — CARE COORDINATION (OUTPATIENT)
Dept: CARDIOLOGY | Facility: CLINIC | Age: 78
End: 2023-08-30
Payer: MEDICARE

## 2023-08-30 NOTE — PROGRESS NOTES
[facility] - CardioMEMS Reading Review    August 30, 2023   CardioMEMS reviewed.  Current diuretic: Bumex 3 mg daily  Recent plan of care changes: increased Bumex to 4 mg for one day last week. Started lymphedema treatment this week.   PAD elevated last few days (sent two readings 8/29 that were 7 and 5. These were 4 minutes apart and both have good waveform.)    Current Threshold parameters:         Today's Waveform:       Readings:               Rosina Mays RN

## 2023-08-30 NOTE — PROGRESS NOTES
[facility]:  CardioMems Routine Remote Evaluation     Dates: 7/28/23 through 8/26/23    Adjustments made in the last month:   8/11/23 - increased Bumex to 3 mg daily for 2 days   8/15/23 - increased Bumex to 3 mg as permanent dose with plans to check labs end of week   8/21/23 - Bumex increased to 4 mg for one day        These adjustments have been discussed with the patient/caregiver and they express verbal understanding.    Threshold Alert Settings:  Last 30 days:        Readings:             Future Appointments   Date Time Provider Department Center   11/28/2023  4:15 PM [facility]   12/1/2023  2:15 PM Austin Miguel APRN [facility]   6/17/2024  2:30 PM Opal Weber MD [facility]

## 2023-09-05 ENCOUNTER — OFFICE VISIT (OUTPATIENT)
Dept: URGENT CARE | Facility: URGENT CARE | Age: 78
End: 2023-09-05
Payer: MEDICARE

## 2023-09-05 ENCOUNTER — TELEPHONE (OUTPATIENT)
Dept: FAMILY MEDICINE | Facility: CLINIC | Age: 78
End: 2023-09-05

## 2023-09-05 ENCOUNTER — ANCILLARY PROCEDURE (OUTPATIENT)
Dept: GENERAL RADIOLOGY | Facility: CLINIC | Age: 78
End: 2023-09-05
Attending: NURSE PRACTITIONER
Payer: MEDICARE

## 2023-09-05 VITALS
SYSTOLIC BLOOD PRESSURE: 150 MMHG | BODY MASS INDEX: 47.77 KG/M2 | OXYGEN SATURATION: 96 % | WEIGHT: 293 LBS | DIASTOLIC BLOOD PRESSURE: 76 MMHG | HEART RATE: 82 BPM | TEMPERATURE: 98.2 F

## 2023-09-05 DIAGNOSIS — M25.531 RIGHT WRIST PAIN: ICD-10-CM

## 2023-09-05 DIAGNOSIS — N18.32 STAGE 3B CHRONIC KIDNEY DISEASE (H): ICD-10-CM

## 2023-09-05 DIAGNOSIS — L03.113 CELLULITIS OF RIGHT WRIST: Primary | ICD-10-CM

## 2023-09-05 PROCEDURE — 99214 OFFICE O/P EST MOD 30 MIN: CPT | Performed by: NURSE PRACTITIONER

## 2023-09-05 PROCEDURE — 73110 X-RAY EXAM OF WRIST: CPT | Mod: TC | Performed by: RADIOLOGY

## 2023-09-05 RX ORDER — CEPHALEXIN 500 MG/1
500 CAPSULE ORAL 2 TIMES DAILY
Qty: 20 CAPSULE | Refills: 0 | Status: SHIPPED | OUTPATIENT
Start: 2023-09-05 | End: 2023-09-15
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
## 2023-09-05 NOTE — TELEPHONE ENCOUNTER
Odalis- care giver calling.  PT has right wrist pain.  No injury.  No redness or swelling.  Pain goes up arm.  Difficulty with amb with this wrist pain.  Uses cane or walker.    Took appt to be seen in clinic this afternoon.  Odalis wants an xray.  JODY Lopez

## 2023-09-06 ENCOUNTER — HOSPITAL ENCOUNTER (OUTPATIENT)
Facility: CLINIC | Age: 78
Setting detail: OBSERVATION
Discharge: NURSING, REHAB OR LONG-TERM CARE | End: 2023-09-11
Attending: EMERGENCY MEDICINE | Admitting: PEDIATRICS
Payer: MEDICARE

## 2023-09-06 ENCOUNTER — APPOINTMENT (OUTPATIENT)
Dept: PHYSICAL THERAPY | Facility: CLINIC | Age: 78
End: 2023-09-06
Payer: MEDICARE

## 2023-09-06 DIAGNOSIS — R26.2 AMBULATORY DYSFUNCTION: ICD-10-CM

## 2023-09-06 DIAGNOSIS — L30.4 INTERTRIGO: ICD-10-CM

## 2023-09-06 DIAGNOSIS — L03.113 CELLULITIS OF RIGHT HAND EXCLUDING FINGERS AND THUMB: Primary | ICD-10-CM

## 2023-09-06 DIAGNOSIS — I89.0 LYMPHEDEMA: Chronic | ICD-10-CM

## 2023-09-06 DIAGNOSIS — M1A.3310 CHRONIC GOUT OF RIGHT WRIST DUE TO RENAL IMPAIRMENT WITHOUT TOPHUS: ICD-10-CM

## 2023-09-06 LAB
ALBUMIN SERPL BCG-MCNC: 4.2 G/DL (ref 3.5–5.2)
ALP SERPL-CCNC: 117 U/L (ref 35–104)
ALT SERPL W P-5'-P-CCNC: 23 U/L (ref 0–50)
ANION GAP SERPL CALCULATED.3IONS-SCNC: 14 MMOL/L (ref 7–15)
AST SERPL W P-5'-P-CCNC: ABNORMAL U/L
BASOPHILS # BLD AUTO: 0.1 10E3/UL (ref 0–0.2)
BASOPHILS NFR BLD AUTO: 1 %
BILIRUB SERPL-MCNC: 0.6 MG/DL
BUN SERPL-MCNC: 41.5 MG/DL (ref 8–23)
CALCIUM SERPL-MCNC: 10.6 MG/DL (ref 8.8–10.2)
CHLORIDE SERPL-SCNC: 102 MMOL/L (ref 98–107)
CREAT SERPL-MCNC: 1.67 MG/DL (ref 0.51–0.95)
CRP SERPL-MCNC: 189 MG/L
DEPRECATED HCO3 PLAS-SCNC: 23 MMOL/L (ref 22–29)
EGFRCR SERPLBLD CKD-EPI 2021: 31 ML/MIN/1.73M2
EOSINOPHIL # BLD AUTO: 0.1 10E3/UL (ref 0–0.7)
EOSINOPHIL NFR BLD AUTO: 1 %
ERYTHROCYTE [DISTWIDTH] IN BLOOD BY AUTOMATED COUNT: 13.5 % (ref 10–15)
GLUCOSE BLDC GLUCOMTR-MCNC: 181 MG/DL (ref 70–99)
GLUCOSE SERPL-MCNC: 187 MG/DL (ref 70–99)
HCT VFR BLD AUTO: 43.5 % (ref 35–47)
HGB BLD-MCNC: 13.9 G/DL (ref 11.7–15.7)
IMM GRANULOCYTES # BLD: 0.1 10E3/UL
IMM GRANULOCYTES NFR BLD: 0 %
LACTATE SERPL-SCNC: 1.4 MMOL/L (ref 0.7–2)
LYMPHOCYTES # BLD AUTO: 1.5 10E3/UL (ref 0.8–5.3)
LYMPHOCYTES NFR BLD AUTO: 13 %
MCH RBC QN AUTO: 28.7 PG (ref 26.5–33)
MCHC RBC AUTO-ENTMCNC: 32 G/DL (ref 31.5–36.5)
MCV RBC AUTO: 90 FL (ref 78–100)
MONOCYTES # BLD AUTO: 0.8 10E3/UL (ref 0–1.3)
MONOCYTES NFR BLD AUTO: 7 %
NEUTROPHILS # BLD AUTO: 9.1 10E3/UL (ref 1.6–8.3)
NEUTROPHILS NFR BLD AUTO: 78 %
NRBC # BLD AUTO: 0 10E3/UL
NRBC BLD AUTO-RTO: 0 /100
PLATELET # BLD AUTO: 165 10E3/UL (ref 150–450)
POTASSIUM SERPL-SCNC: 5.2 MMOL/L (ref 3.4–5.3)
PROCALCITONIN SERPL IA-MCNC: 0.18 NG/ML
PROT SERPL-MCNC: 7.8 G/DL (ref 6.4–8.3)
RBC # BLD AUTO: 4.85 10E6/UL (ref 3.8–5.2)
SODIUM SERPL-SCNC: 139 MMOL/L (ref 136–145)
WBC # BLD AUTO: 11.6 10E3/UL (ref 4–11)

## 2023-09-06 PROCEDURE — 99285 EMERGENCY DEPT VISIT HI MDM: CPT | Mod: 25 | Performed by: EMERGENCY MEDICINE

## 2023-09-06 PROCEDURE — 86140 C-REACTIVE PROTEIN: CPT | Performed by: PHYSICIAN ASSISTANT

## 2023-09-06 PROCEDURE — 99223 1ST HOSP IP/OBS HIGH 75: CPT | Mod: AI | Performed by: INTERNAL MEDICINE

## 2023-09-06 PROCEDURE — 85004 AUTOMATED DIFF WBC COUNT: CPT | Performed by: EMERGENCY MEDICINE

## 2023-09-06 PROCEDURE — 82962 GLUCOSE BLOOD TEST: CPT

## 2023-09-06 PROCEDURE — 36415 COLL VENOUS BLD VENIPUNCTURE: CPT | Performed by: EMERGENCY MEDICINE

## 2023-09-06 PROCEDURE — 84145 PROCALCITONIN (PCT): CPT | Performed by: PHYSICIAN ASSISTANT

## 2023-09-06 PROCEDURE — 97161 PT EVAL LOW COMPLEX 20 MIN: CPT | Mod: GP

## 2023-09-06 PROCEDURE — 99285 EMERGENCY DEPT VISIT HI MDM: CPT | Performed by: EMERGENCY MEDICINE

## 2023-09-06 PROCEDURE — 99207 PR APP CREDIT; MD BILLING SHARED VISIT: CPT | Performed by: PHYSICIAN ASSISTANT

## 2023-09-06 PROCEDURE — 97530 THERAPEUTIC ACTIVITIES: CPT | Mod: GP

## 2023-09-06 PROCEDURE — 83605 ASSAY OF LACTIC ACID: CPT | Performed by: EMERGENCY MEDICINE

## 2023-09-06 PROCEDURE — 120N000002 HC R&B MED SURG/OB UMMC

## 2023-09-06 PROCEDURE — 250N000013 HC RX MED GY IP 250 OP 250 PS 637: Performed by: PHYSICIAN ASSISTANT

## 2023-09-06 PROCEDURE — 999N000127 HC STATISTIC PERIPHERAL IV START W US GUIDANCE

## 2023-09-06 PROCEDURE — 84155 ASSAY OF PROTEIN SERUM: CPT | Performed by: EMERGENCY MEDICINE

## 2023-09-06 RX ORDER — AMOXICILLIN 250 MG
1 CAPSULE ORAL 2 TIMES DAILY PRN
Status: DISCONTINUED | OUTPATIENT
Start: 2023-09-06 | End: 2023-09-09

## 2023-09-06 RX ORDER — DEXTROSE MONOHYDRATE 25 G/50ML
25-50 INJECTION, SOLUTION INTRAVENOUS
Status: DISCONTINUED | OUTPATIENT
Start: 2023-09-06 | End: 2023-09-11 | Stop reason: HOSPADM

## 2023-09-06 RX ORDER — POLYETHYLENE GLYCOL 3350 17 G/17G
17 POWDER, FOR SOLUTION ORAL DAILY PRN
Status: DISCONTINUED | OUTPATIENT
Start: 2023-09-06 | End: 2023-09-11

## 2023-09-06 RX ORDER — ALBUTEROL SULFATE 90 UG/1
2 AEROSOL, METERED RESPIRATORY (INHALATION) EVERY 4 HOURS PRN
Status: DISCONTINUED | OUTPATIENT
Start: 2023-09-06 | End: 2023-09-11 | Stop reason: HOSPADM

## 2023-09-06 RX ORDER — ACETAMINOPHEN 325 MG/1
650 TABLET ORAL EVERY 6 HOURS PRN
Status: DISCONTINUED | OUTPATIENT
Start: 2023-09-06 | End: 2023-09-11 | Stop reason: HOSPADM

## 2023-09-06 RX ORDER — AMOXICILLIN 250 MG
2 CAPSULE ORAL 2 TIMES DAILY PRN
Status: DISCONTINUED | OUTPATIENT
Start: 2023-09-06 | End: 2023-09-09

## 2023-09-06 RX ORDER — ATORVASTATIN CALCIUM 40 MG/1
40 TABLET, FILM COATED ORAL DAILY
Status: DISCONTINUED | OUTPATIENT
Start: 2023-09-07 | End: 2023-09-11 | Stop reason: HOSPADM

## 2023-09-06 RX ORDER — CEPHALEXIN 500 MG/1
500 CAPSULE ORAL 2 TIMES DAILY
Status: DISCONTINUED | OUTPATIENT
Start: 2023-09-06 | End: 2023-09-07

## 2023-09-06 RX ORDER — METOPROLOL SUCCINATE 25 MG/1
25 TABLET, EXTENDED RELEASE ORAL AT BEDTIME
Status: DISCONTINUED | OUTPATIENT
Start: 2023-09-06 | End: 2023-09-11 | Stop reason: HOSPADM

## 2023-09-06 RX ORDER — HEPARIN SODIUM 5000 [USP'U]/.5ML
5000 INJECTION, SOLUTION INTRAVENOUS; SUBCUTANEOUS EVERY 8 HOURS
Status: DISCONTINUED | OUTPATIENT
Start: 2023-09-07 | End: 2023-09-11 | Stop reason: HOSPADM

## 2023-09-06 RX ORDER — ASPIRIN 81 MG/1
81 TABLET ORAL DAILY
Status: DISCONTINUED | OUTPATIENT
Start: 2023-09-07 | End: 2023-09-11 | Stop reason: HOSPADM

## 2023-09-06 RX ORDER — ANASTROZOLE 1 MG/1
1 TABLET ORAL DAILY
Status: DISCONTINUED | OUTPATIENT
Start: 2023-09-07 | End: 2023-09-11 | Stop reason: HOSPADM

## 2023-09-06 RX ORDER — LOSARTAN POTASSIUM 25 MG/1
25 TABLET ORAL DAILY
Status: DISCONTINUED | OUTPATIENT
Start: 2023-09-07 | End: 2023-09-11 | Stop reason: HOSPADM

## 2023-09-06 RX ORDER — VENLAFAXINE HYDROCHLORIDE 75 MG/1
75 CAPSULE, EXTENDED RELEASE ORAL DAILY
Status: DISCONTINUED | OUTPATIENT
Start: 2023-09-07 | End: 2023-09-11 | Stop reason: HOSPADM

## 2023-09-06 RX ORDER — FERROUS GLUCONATE 324(38)MG
324 TABLET ORAL
Status: DISCONTINUED | OUTPATIENT
Start: 2023-09-08 | End: 2023-09-11 | Stop reason: HOSPADM

## 2023-09-06 RX ORDER — TOLTERODINE 4 MG/1
4 CAPSULE, EXTENDED RELEASE ORAL DAILY
Status: DISCONTINUED | OUTPATIENT
Start: 2023-09-07 | End: 2023-09-11 | Stop reason: HOSPADM

## 2023-09-06 RX ORDER — NICOTINE POLACRILEX 4 MG
15-30 LOZENGE BUCCAL
Status: DISCONTINUED | OUTPATIENT
Start: 2023-09-06 | End: 2023-09-11 | Stop reason: HOSPADM

## 2023-09-06 RX ORDER — COLCHICINE 0.6 MG/1
0.6 TABLET ORAL EVERY EVENING
Status: DISCONTINUED | OUTPATIENT
Start: 2023-09-06 | End: 2023-09-07

## 2023-09-06 RX ORDER — LEVOTHYROXINE SODIUM 75 UG/1
150 TABLET ORAL DAILY
Status: DISCONTINUED | OUTPATIENT
Start: 2023-09-07 | End: 2023-09-11 | Stop reason: HOSPADM

## 2023-09-06 RX ORDER — LIDOCAINE 40 MG/G
CREAM TOPICAL
Status: DISCONTINUED | OUTPATIENT
Start: 2023-09-06 | End: 2023-09-11 | Stop reason: HOSPADM

## 2023-09-06 RX ORDER — REPAGLINIDE 0.5 MG/1
1 TABLET ORAL
Status: DISCONTINUED | OUTPATIENT
Start: 2023-09-06 | End: 2023-09-09

## 2023-09-06 RX ORDER — HEPARIN SODIUM 5000 [USP'U]/.5ML
5000 INJECTION, SOLUTION INTRAVENOUS; SUBCUTANEOUS EVERY 12 HOURS
Status: DISCONTINUED | OUTPATIENT
Start: 2023-09-07 | End: 2023-09-06

## 2023-09-06 RX ORDER — ONDANSETRON 2 MG/ML
4 INJECTION INTRAMUSCULAR; INTRAVENOUS EVERY 6 HOURS PRN
Status: DISCONTINUED | OUTPATIENT
Start: 2023-09-06 | End: 2023-09-11 | Stop reason: HOSPADM

## 2023-09-06 RX ORDER — DAPAGLIFLOZIN 10 MG/1
10 TABLET, FILM COATED ORAL DAILY
Status: DISCONTINUED | OUTPATIENT
Start: 2023-09-06 | End: 2023-09-09

## 2023-09-06 RX ORDER — ONDANSETRON 4 MG/1
4 TABLET, ORALLY DISINTEGRATING ORAL EVERY 6 HOURS PRN
Status: DISCONTINUED | OUTPATIENT
Start: 2023-09-06 | End: 2023-09-11 | Stop reason: HOSPADM

## 2023-09-06 RX ORDER — PREGABALIN 100 MG/1
100 CAPSULE ORAL 2 TIMES DAILY
Status: DISCONTINUED | OUTPATIENT
Start: 2023-09-06 | End: 2023-09-11 | Stop reason: HOSPADM

## 2023-09-06 RX ADMIN — COLCHICINE 0.6 MG: 0.6 TABLET, FILM COATED ORAL at 23:59

## 2023-09-06 RX ADMIN — PREGABALIN 100 MG: 100 CAPSULE ORAL at 23:59

## 2023-09-06 ASSESSMENT — ACTIVITIES OF DAILY LIVING (ADL)
TRANSFERRING: 1-->ASSISTANCE (EQUIPMENT/PERSON) NEEDED (NOT DEVELOPMENTALLY APPROPRIATE)
ADLS_ACUITY_SCORE: 53
CONCENTRATING,_REMEMBERING_OR_MAKING_DECISIONS_DIFFICULTY: YES
TOILETING_ISSUES: YES
VISION_MANAGEMENT: SUNGLASSES
ADLS_ACUITY_SCORE: 37
DRESS: 1-->ASSISTANCE (EQUIPMENT/PERSON) NEEDED (NOT DEVELOPMENTALLY APPROPRIATE)
DRESSING/BATHING_DIFFICULTY: YES
TOILETING_ASSISTANCE: TOILETING DIFFICULTY, ASSISTANCE 1 PERSON;TOILETING DIFFICULTY, DEPENDENT
WEAR_GLASSES_OR_BLIND: YES
BATHING: 1-->ASSISTANCE NEEDED
DOING_ERRANDS_INDEPENDENTLY_DIFFICULTY: YES
CHANGE_IN_FUNCTIONAL_STATUS_SINCE_ONSET_OF_CURRENT_ILLNESS/INJURY: NO
EQUIPMENT_CURRENTLY_USED_AT_HOME: CANE, STRAIGHT;SHOWER CHAIR;WALKER, ROLLING
TRANSFERRING: 1-->ASSISTANCE (EQUIPMENT/PERSON) NEEDED
ADLS_ACUITY_SCORE: 45
FALL_HISTORY_WITHIN_LAST_SIX_MONTHS: YES
ADLS_ACUITY_SCORE: 45
TOILETING: 2-->COMPLETELY DEPENDENT (NOT DEVELOPMENTALLY APPROPRIATE)
WALKING_OR_CLIMBING_STAIRS: AMBULATION DIFFICULTY, REQUIRES EQUIPMENT;AMBULATION DIFFICULTY, DEPENDENT;STAIR CLIMBING DIFFICULTY, DEPENDENT;TRANSFERRING DIFFICULTY, DEPENDENT
TOILETING: 2-->COMPLETELY DEPENDENT
NUMBER_OF_TIMES_PATIENT_HAS_FALLEN_WITHIN_LAST_SIX_MONTHS: 4
DIFFICULTY_EATING/SWALLOWING: NO
WALKING_OR_CLIMBING_STAIRS_DIFFICULTY: YES
ADLS_ACUITY_SCORE: 37
DRESSING/BATHING: BATHING DIFFICULTY, REQUIRES EQUIPMENT;BATHING DIFFICULTY, ASSISTANCE 1 PERSON
ADLS_ACUITY_SCORE: 57
DRESS: 1-->ASSISTANCE (EQUIPMENT/PERSON) NEEDED
ADLS_ACUITY_SCORE: 45

## 2023-09-06 NOTE — PROGRESS NOTES
"ED PT Eval       09/06/23 1400   Appointment Info   Signing Clinician's Name / Credentials (PT) Ced Gilman, PT, DPT   Rehab Comments (PT) evaluated in ED per team request to determine next steps   Living Environment   People in Home other (see comments)  (roommate)   Current Living Arrangements house   Home Accessibility stairs to enter home   Number of Stairs, Main Entrance 2   Stair Railings, Main Entrance railings safe and in good condition   Transportation Anticipated family or friend will provide   Living Environment Comments Patient lives in home with roommate with 2 BYRON.   Self-Care   Usual Activity Tolerance fair   Current Activity Tolerance poor   Equipment Currently Used at Home walker, rolling   Activity/Exercise/Self-Care Comment Patient quite confused, history gathered from roommate (Odalis). Patient at TCU for ~3 weeks this time last year and has been working with home health PT  since. Patient has limited mobilty at baseline and EMR indicates that they are at patient's home weekly to assist patient for transfers.   General Information   Onset of Illness/Injury or Date of Surgery 09/06/23   Referring Physician Anne Joseph APRN CNP   Patient/Family Therapy Goals Statement (PT) To return home safely with HH PT   Pertinent History of Current Problem (include personal factors and/or comorbidities that impact the POC) Per EMR \" 79 yo with lymphedema started on antibiotics for wrist cellulitis. Patient with weakness and unable to ambulate. Admission for PT/OT and potential placement. ER observation declined patient and [place] [facility] \"   Cognition   Affect/Mental Status (Cognition) WFL   Posture    Posture Forward head position;Protracted shoulders   Range of Motion (ROM)   Range of Motion ROM is WFL   ROM Comment R  wrist pronation/supination, flexion/extension limited by pain   Strength (Manual Muscle Testing)   Strength (Manual Muscle Testing) Deficits observed during functional mobility "   Strength Comments grossly deconditioned   Bed Mobility   Bed Mobility sit-supine;supine-sit   Supine-Sit [place] (Bed Mobility) minimum assist (75% patient effort)   Sit-Supine [place] (Bed Mobility) minimum assist (75% patient effort)   Comment, (Bed Mobility) trunk support and assist for coordinating task   Transfers   Transfers sit-stand transfer   Sit-Stand Transfer   Sit-Stand [place] (Transfers) minimum assist (75% patient effort)   Assistive Device (Sit-Stand Transfers) walker, front-wheeled   Gait/Stairs (Locomotion)   Comment, (Gait/Stairs) declined   Balance   Balance Comments min A sitting EOB initially   Clinical Impression   Criteria for Skilled Therapeutic Intervention Yes, treatment indicated   PT Diagnosis (PT) impaired functional mobility   Influenced by the following impairments decreased activity tolerance   Functional limitations due to impairments bed mobilty, transfers, gait, stairs   Clinical Presentation (PT Evaluation Complexity) Stable/Uncomplicated   Clinical Presentation Rationale clinical judgement   Clinical Decision Making (Complexity) low complexity   Planned Therapy Interventions (PT) bed mobility training;gait training;balance training;ROM (range of motion);stair training;strengthening;stretching;transfer training   Risk & Benefits of therapy have been explained evaluation/treatment results reviewed;care plan/treatment goals reviewed;risks/benefits reviewed;current/potential barriers reviewed;participants voiced agreement with care plan;patient;participants included   PT Total Evaluation Time   PT Eval, Low Complexity Minutes (15586) 10   Physical Therapy Goals   PT Frequency 5x/week   PT Predicted Duration/Target Date for Goal Attainment 09/20/23   PT Goals Bed Mobility;Transfers;Gait;Stairs   PT: Bed Mobility Modified independent;Supine to/from sit   PT: Transfers Modified independent;Sit to/from stand;Assistive device   PT: Gait Modified independent;100  feet;Assistive device   PT: Stairs Modified independent;2 stairs   Interventions   Interventions Quick Adds Therapeutic Activity   PT Discharge Planning   PT Plan bed mobility, pregait at EOB, gait with chair follow   PT Discharge Recommendation (DC Rec) Transitional Care Facility   PT Rationale for DC Rec At this time patient quite confused and with impaired mobility. Currently recommend TCU to maximize IND before patient returns home. Patient's roommate reports that she got much stronger at TCU last summer and has been mobilizing well at home ever since.   PT Brief overview of current status Ax2 for pivot with wide walker and gait belt   Total Session Time   Total Session Time (sum of timed and untimed services) 10         Ced Gilman, PT, DPT  Pager #[phone]

## 2023-09-06 NOTE — LETTER
"          Recipient: Admissions: [facility]          Sender: [facility] [phone]          Date: September 11, 2023  Patient Name:  Mariel Ribeiro  Patient YOB: 1945  Routing Message:  Please look under \"Future tests that were ordered for you\" tab of the AVS for the [place] PRN order. Made sure the IntraDRY is discontinued. Thanks.        The documents accompanying this notice contain confidential information belonging to the sender.  This information is intended only for the use of the individual or entity named above.  The authorized recipient of this information is prohibited from disclosing this information to any other party and is required to destroy the information after its stated need has been fulfilled, unless otherwise required by state law.    If you are not the intended recipient, you are hereby notified that any disclosure, copy, distribution or action taken in reliance on the contents of these documents is strictly prohibited.  If you have received this document in error, please return it by fax to [phone] with a note on the cover sheet explaining why you are returning it (e.g. not your patient, not your provider, etc.).  If you need further assistance, please call .  Documents may also be returned by mail to [employer], , [address], [place], [place] [address].  "

## 2023-09-06 NOTE — CARE PLAN
Transfer Type: [facility]  Transfer Triage Note    Originating unit:[facility] ED    Final intended location for transfer: [facility] surg or IMC, or ICU    Tele required:  No    Time of admission request: 1500    Anticipated to be boarding in ED for >4 hours? Yes    Was Hospitalist Team notified to complete H&P, admission orders, and assume care (sign into chart, collaborate with ED nurse, etc)? Yes    Patient added to Interhospital transfer list?  Yes    Brief case description: 79 yo with lymphedema started on antibiotics for wrist cellulitis. Patient with weakness and unable to ambulate. Admission for PT/OT and potential placement. ER observation declined patient and admission to [facility].     Martinez Joseph MD
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
## 2023-09-06 NOTE — PROGRESS NOTES
Remote monitoring of Pulmonary Artery Pressures via CardioMEMS device reviewed at least weekly and as needed with C[facility] nursing. Diuretics adjusted accordingly.     documentation billing dates of 7/28/23 through 8/26/23     [place] [facility]

## 2023-09-06 NOTE — ED TRIAGE NOTES
Pt BIBA for weakness. They were called for a lift assist initially. Pt was sitting on the patio in a chair. They have been up all night at urgent care to get a prescription for antibiotics for R hand cellulitis. They got home from urgent care and was unable to walk from the garage to the house. SPF was there to help her get in to the house. Pt could not stand and bear weight. She was incontinent of urine. Her partner was reluctant at first. Medics say they go there once a week for a lift assist. They report she is in a declining state of health.   . Afebrile for EMS. Pulse rate 70. Bp 160/100. Lymphaedema present

## 2023-09-06 NOTE — PROGRESS NOTES
Care Management Initial Consult    General Information  Assessment completed with: Caregiver, Friend, Odalis-Friend/Caregiver  Type of CM/SW Visit: Initial Assessment    Primary Care Provider verified and updated as needed: Yes   Readmission within the last 30 days: no previous admission in last 30 days      Reason for Consult: discharge planning  Advance Care Planning: Advance Care Planning Reviewed: present on chart          Communication Assessment  Patient's communication style: spoken language (English or Bilingual)             Cognitive  Cognitive/Neuro/Behavioral: .WDL except                      Living Environment:   People in home: friend(s)  Odalis  Current living Arrangements: house (2 Steps to get into home; all one level)      Able to return to prior arrangements: no       Family/Social Support:  Care provided by: friend  Provides care for: no one, unable/limited ability to care for self  Marital Status: Single   (Friend)          Description of Support System: Supportive, Involved    Support Assessment: Adequate social supports    Current Resources:   Patient receiving home care services:  ([facility]     Community Resources:  ([facility])  Equipment currently used at home: walker, rolling  Supplies currently used at home: Diabetic Supplies, Incontinence Supplies, Compression Stockings    Employment/Financial:  Employment Status: disabled        Financial Concerns: No concerns identified   Referral to Financial Worker: No       Does the patient's insurance plan have a 3 day qualifying hospital stay waiver?  No    Lifestyle & Psychosocial Needs:  Social Determinants of Health     Tobacco Use: Medium Risk (9/5/2023)    Patient History     Smoking Tobacco Use: Former     Smokeless Tobacco Use: Never     Passive Exposure: Not on file   Alcohol Use: Not on file   Financial Resource Strain: Not on file   Food Insecurity: Not on file   Transportation Needs: Not  "on file   Physical Activity: Not on file   Stress: Not on file   Social Connections: Not on file   Intimate Partner Violence: Not on file   Depression: Not at risk (2/7/2023)    PHQ-2     PHQ-2 Score: 2   Housing Stability: Not on file       Functional Status:  Prior to admission patient needed assistance:   Dependent ADLs:: Bathing, Dressing, Ambulation-walker  Dependent IADLs:: Medication Management, Money Management, Cleaning, Cooking, Laundry, Shopping, Transportation, Meal Preparation       Mental Health Status:  Mental Health Status: No Current Concerns       Chemical Dependency Status:  Chemical Dependency Status: No Current Concerns             Values/Beliefs:  Spiritual, Cultural Beliefs, Islam Practices, Values that affect care: no               Additional Information:  Mariel Ribeiro is a 78 year old female patient with a past medical history significant for HFpEF (EF 45-50%) s/p CardioMem implant (goal PA diastolic 14-18), CAD s/p PCI x 2 to LAD and CABG 2018 (LIMA-> LAD, SVG->OM2 and RPDA), BELEN on CPAP, HTN, HLD, thyroid cancer s/p thyroidectomy with resultant hypothyroidism, BELEN, T2DM c/b CKD3, lymphedema, depression, anxiety, insomnia, GERD, gout, chronic pain, COPD, morbid obesity, 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     Chart reviewed. Case discussed with bedside ED RN and ED medical provider. Of note from the H&P, \"EMS initially called for lift assist and per medic report, they generally go to her place of residence once a week for list assistance and that she has had a general decline in her health.\"    Writer met with pt, and pt's long time friend/caregiver/POA/housemate, Odalis, in ED Hallway. While pt was present, d/t her " current cognition, pt rarely participated in the conversation.  Pt lives in her house with her friend, Odalis, who works from home, but essentially functions as pt's caregiver, assisting pt with many of her ADLs.     At the time of the assessment pt's admission to IP or Obs status was still being decided. Writer explained and went over the costs of TCU if pt is admitted as obs. Discussed how pt would have to stay for 3 midnights as IP for medicare to provide coverage for TCU. Writer answered Odalis's questions RE: coverage. As of the completion of this note, Odalis reports that pt would be open to considering TCU at this point, but they also may want to return home with HHC. Medical work up will likely help influence their decision.     Discussed potential discharge needs to include TCU. List of Medicare certified options reviewed with patient/family/caregiver. Offered list and patient's right to choose among available options. Explained any financial ties to [facility]. If HHC is needed, Odalis would like to utilize [facility], whom they already utilize once a week on a private pay basis, for a HHA to come into the home and bathe pt.     Social work will continue to follow and provide assistance to ensure a safe and timely discharge.   ________________    NAKITA Fernandes, [facility]  ED/Observation   [facility]  Phone: [phone]  Pager: [phone]  Fax: [phone]    On-call pager, [phone], 4:00pm to midnight

## 2023-09-06 NOTE — PLAN OF CARE
Confused, denies pain and nausea.  insulin not available to give. Pt is transferring to [facility] on 6 medsurg. Report given to nurse . Waiting on transport.

## 2023-09-06 NOTE — DISCHARGE INSTRUCTIONS
[facility]  Phone: [phone]  [facility] works as a referral agency for finding an independent living, or assisted living that can best meet your needs and any criteria you have. They will sit down and have a conversation with you about what you are looking for in a facility and match you with a facility that meets your needs. This service is free of charge and they make their fee by charging the facility a referral fee.

## 2023-09-07 ENCOUNTER — APPOINTMENT (OUTPATIENT)
Dept: ULTRASOUND IMAGING | Facility: CLINIC | Age: 78
End: 2023-09-07
Payer: MEDICARE

## 2023-09-07 PROBLEM — L03.113 CELLULITIS OF RIGHT HAND EXCLUDING FINGERS AND THUMB: Status: ACTIVE | Noted: 2023-09-07

## 2023-09-07 LAB
ALBUMIN SERPL BCG-MCNC: 4.1 G/DL (ref 3.5–5.2)
ALP SERPL-CCNC: 111 U/L (ref 35–104)
ALT SERPL W P-5'-P-CCNC: 24 U/L (ref 0–50)
ANION GAP SERPL CALCULATED.3IONS-SCNC: 14 MMOL/L (ref 7–15)
AST SERPL W P-5'-P-CCNC: 32 U/L (ref 0–45)
BASE EXCESS BLDV CALC-SCNC: 3.5 MMOL/L (ref -7.7–1.9)
BILIRUB DIRECT SERPL-MCNC: <0.2 MG/DL (ref 0–0.3)
BILIRUB SERPL-MCNC: 0.5 MG/DL
BUN SERPL-MCNC: 38.6 MG/DL (ref 8–23)
CA-I BLD-MCNC: 5.1 MG/DL (ref 4.4–5.2)
CALCIUM SERPL-MCNC: 10.8 MG/DL (ref 8.8–10.2)
CHLORIDE SERPL-SCNC: 104 MMOL/L (ref 98–107)
CREAT SERPL-MCNC: 1.45 MG/DL (ref 0.51–0.95)
CRP SERPL-MCNC: 155.55 MG/L
DEPRECATED HCO3 PLAS-SCNC: 24 MMOL/L (ref 22–29)
EGFRCR SERPLBLD CKD-EPI 2021: 37 ML/MIN/1.73M2
ERYTHROCYTE [DISTWIDTH] IN BLOOD BY AUTOMATED COUNT: 13.2 % (ref 10–15)
ERYTHROCYTE [SEDIMENTATION RATE] IN BLOOD BY WESTERGREN METHOD: 47 MM/HR (ref 0–30)
GLUCOSE BLDC GLUCOMTR-MCNC: 179 MG/DL (ref 70–99)
GLUCOSE BLDC GLUCOMTR-MCNC: 190 MG/DL (ref 70–99)
GLUCOSE BLDC GLUCOMTR-MCNC: 202 MG/DL (ref 70–99)
GLUCOSE BLDC GLUCOMTR-MCNC: 204 MG/DL (ref 70–99)
GLUCOSE BLDC GLUCOMTR-MCNC: 267 MG/DL (ref 70–99)
GLUCOSE SERPL-MCNC: 185 MG/DL (ref 70–99)
HCO3 BLDV-SCNC: 28 MMOL/L (ref 21–28)
HCT VFR BLD AUTO: 44 % (ref 35–47)
HGB BLD-MCNC: 13.9 G/DL (ref 11.7–15.7)
LACTATE SERPL-SCNC: 1.9 MMOL/L (ref 0.7–2)
MCH RBC QN AUTO: 28.4 PG (ref 26.5–33)
MCHC RBC AUTO-ENTMCNC: 31.6 G/DL (ref 31.5–36.5)
MCV RBC AUTO: 90 FL (ref 78–100)
O2/TOTAL GAS SETTING VFR VENT: 21 %
PCO2 BLDV: 43 MM HG (ref 40–50)
PH BLDV: 7.43 [PH] (ref 7.32–7.43)
PLATELET # BLD AUTO: 171 10E3/UL (ref 150–450)
PO2 BLDV: 31 MM HG (ref 25–47)
POTASSIUM SERPL-SCNC: 4.4 MMOL/L (ref 3.4–5.3)
PROT SERPL-MCNC: 7 G/DL (ref 6.4–8.3)
RBC # BLD AUTO: 4.9 10E6/UL (ref 3.8–5.2)
SODIUM SERPL-SCNC: 142 MMOL/L (ref 136–145)
TSH SERPL DL<=0.005 MIU/L-ACNC: 1.89 UIU/ML (ref 0.3–4.2)
URATE SERPL-MCNC: 10 MG/DL (ref 2.4–5.7)
WBC # BLD AUTO: 9.8 10E3/UL (ref 4–11)

## 2023-09-07 PROCEDURE — G0378 HOSPITAL OBSERVATION PER HR: HCPCS

## 2023-09-07 PROCEDURE — 86140 C-REACTIVE PROTEIN: CPT | Performed by: PEDIATRICS

## 2023-09-07 PROCEDURE — 36415 COLL VENOUS BLD VENIPUNCTURE: CPT

## 2023-09-07 PROCEDURE — 87075 CULTR BACTERIA EXCEPT BLOOD: CPT

## 2023-09-07 PROCEDURE — 84443 ASSAY THYROID STIM HORMONE: CPT | Performed by: PEDIATRICS

## 2023-09-07 PROCEDURE — 82962 GLUCOSE BLOOD TEST: CPT

## 2023-09-07 PROCEDURE — 96372 THER/PROPH/DIAG INJ SC/IM: CPT | Performed by: PHYSICIAN ASSISTANT

## 2023-09-07 PROCEDURE — 87102 FUNGUS ISOLATION CULTURE: CPT

## 2023-09-07 PROCEDURE — 84550 ASSAY OF BLOOD/URIC ACID: CPT | Performed by: PEDIATRICS

## 2023-09-07 PROCEDURE — 36415 COLL VENOUS BLD VENIPUNCTURE: CPT | Performed by: PEDIATRICS

## 2023-09-07 PROCEDURE — 99233 SBSQ HOSP IP/OBS HIGH 50: CPT | Performed by: PEDIATRICS

## 2023-09-07 PROCEDURE — 87205 SMEAR GRAM STAIN: CPT

## 2023-09-07 PROCEDURE — 89050 BODY FLUID CELL COUNT: CPT

## 2023-09-07 PROCEDURE — 82330 ASSAY OF CALCIUM: CPT | Performed by: PHYSICIAN ASSISTANT

## 2023-09-07 PROCEDURE — 250N000009 HC RX 250

## 2023-09-07 PROCEDURE — 96374 THER/PROPH/DIAG INJ IV PUSH: CPT

## 2023-09-07 PROCEDURE — 250N000013 HC RX MED GY IP 250 OP 250 PS 637: Performed by: PHYSICIAN ASSISTANT

## 2023-09-07 PROCEDURE — 250N000011 HC RX IP 250 OP 636: Mod: JZ | Performed by: STUDENT IN AN ORGANIZED HEALTH CARE EDUCATION/TRAINING PROGRAM

## 2023-09-07 PROCEDURE — 83605 ASSAY OF LACTIC ACID: CPT | Performed by: INTERNAL MEDICINE

## 2023-09-07 PROCEDURE — 85652 RBC SED RATE AUTOMATED: CPT

## 2023-09-07 PROCEDURE — 85027 COMPLETE CBC AUTOMATED: CPT | Performed by: PHYSICIAN ASSISTANT

## 2023-09-07 PROCEDURE — 82803 BLOOD GASES ANY COMBINATION: CPT | Performed by: PEDIATRICS

## 2023-09-07 PROCEDURE — 36415 COLL VENOUS BLD VENIPUNCTURE: CPT | Performed by: PHYSICIAN ASSISTANT

## 2023-09-07 PROCEDURE — 99207 PR NO CHARGE LOS: CPT | Performed by: PEDIATRICS

## 2023-09-07 PROCEDURE — 87070 CULTURE OTHR SPECIMN AEROBIC: CPT

## 2023-09-07 PROCEDURE — 76882 US LMTD JT/FCL EVL NVASC XTR: CPT | Mod: RT

## 2023-09-07 PROCEDURE — 250N000013 HC RX MED GY IP 250 OP 250 PS 637: Performed by: PEDIATRICS

## 2023-09-07 PROCEDURE — 999N000111 HC STATISTIC OT IP EVAL DEFER

## 2023-09-07 PROCEDURE — 250N000011 HC RX IP 250 OP 636: Performed by: PHYSICIAN ASSISTANT

## 2023-09-07 PROCEDURE — 76882 US LMTD JT/FCL EVL NVASC XTR: CPT | Mod: 26 | Performed by: RADIOLOGY

## 2023-09-07 PROCEDURE — 250N000012 HC RX MED GY IP 250 OP 636 PS 637: Performed by: PHYSICIAN ASSISTANT

## 2023-09-07 PROCEDURE — 82248 BILIRUBIN DIRECT: CPT | Performed by: PHYSICIAN ASSISTANT

## 2023-09-07 RX ORDER — CEPHALEXIN 500 MG/1
500 CAPSULE ORAL EVERY 12 HOURS SCHEDULED
Status: DISCONTINUED | OUTPATIENT
Start: 2023-09-07 | End: 2023-09-11 | Stop reason: HOSPADM

## 2023-09-07 RX ORDER — LIDOCAINE HYDROCHLORIDE 10 MG/ML
INJECTION, SOLUTION EPIDURAL; INFILTRATION; INTRACAUDAL; PERINEURAL
Status: COMPLETED
Start: 2023-09-07 | End: 2023-09-07

## 2023-09-07 RX ORDER — OLANZAPINE 5 MG/1
5 TABLET, ORALLY DISINTEGRATING ORAL ONCE
Status: DISCONTINUED | OUTPATIENT
Start: 2023-09-07 | End: 2023-09-07

## 2023-09-07 RX ORDER — OLANZAPINE 10 MG/2ML
5 INJECTION, POWDER, FOR SOLUTION INTRAMUSCULAR ONCE
Status: DISCONTINUED | OUTPATIENT
Start: 2023-09-07 | End: 2023-09-07

## 2023-09-07 RX ORDER — HYDROMORPHONE HCL IN WATER/PF 6 MG/30 ML
0.2 PATIENT CONTROLLED ANALGESIA SYRINGE INTRAVENOUS ONCE
Status: COMPLETED | OUTPATIENT
Start: 2023-09-07 | End: 2023-09-07

## 2023-09-07 RX ORDER — COLCHICINE 0.6 MG
1.2 TABLET ORAL ONCE
Status: COMPLETED | OUTPATIENT
Start: 2023-09-07 | End: 2023-09-07

## 2023-09-07 RX ORDER — COLCHICINE 0.6 MG/1
0.6 TABLET ORAL DAILY
Status: DISCONTINUED | OUTPATIENT
Start: 2023-09-08 | End: 2023-09-11

## 2023-09-07 RX ORDER — COLCHICINE 0.6 MG
0.6 TABLET ORAL 2 TIMES DAILY
Status: DISCONTINUED | OUTPATIENT
Start: 2023-09-07 | End: 2023-09-07

## 2023-09-07 RX ADMIN — LEVOTHYROXINE SODIUM 150 MCG: 75 TABLET ORAL at 08:43

## 2023-09-07 RX ADMIN — PREGABALIN 100 MG: 100 CAPSULE ORAL at 20:03

## 2023-09-07 RX ADMIN — PREGABALIN 100 MG: 100 CAPSULE ORAL at 08:43

## 2023-09-07 RX ADMIN — MICONAZOLE NITRATE: 20 POWDER TOPICAL at 20:03

## 2023-09-07 RX ADMIN — ATORVASTATIN CALCIUM 40 MG: 40 TABLET, FILM COATED ORAL at 08:43

## 2023-09-07 RX ADMIN — LIDOCAINE HYDROCHLORIDE: 10 INJECTION, SOLUTION EPIDURAL; INFILTRATION; INTRACAUDAL; PERINEURAL at 19:00

## 2023-09-07 RX ADMIN — INSULIN ASPART 1 UNITS: 100 INJECTION, SOLUTION INTRAVENOUS; SUBCUTANEOUS at 02:27

## 2023-09-07 RX ADMIN — ANASTROZOLE 1 MG: 1 TABLET, COATED ORAL at 08:43

## 2023-09-07 RX ADMIN — INSULIN ASPART 2 UNITS: 100 INJECTION, SOLUTION INTRAVENOUS; SUBCUTANEOUS at 22:00

## 2023-09-07 RX ADMIN — CEPHALEXIN 500 MG: 500 CAPSULE ORAL at 20:03

## 2023-09-07 RX ADMIN — COLCHICINE 1.2 MG: 0.6 TABLET, FILM COATED ORAL at 12:52

## 2023-09-07 RX ADMIN — VENLAFAXINE HYDROCHLORIDE 75 MG: 75 CAPSULE, EXTENDED RELEASE ORAL at 08:43

## 2023-09-07 RX ADMIN — HEPARIN SODIUM 5000 UNITS: 5000 INJECTION, SOLUTION INTRAVENOUS; SUBCUTANEOUS at 21:41

## 2023-09-07 RX ADMIN — ACETAMINOPHEN 650 MG: 325 TABLET, FILM COATED ORAL at 21:40

## 2023-09-07 RX ADMIN — METOPROLOL SUCCINATE 25 MG: 25 TABLET, EXTENDED RELEASE ORAL at 00:00

## 2023-09-07 RX ADMIN — ASPIRIN 81 MG: 81 TABLET, COATED ORAL at 08:43

## 2023-09-07 RX ADMIN — CEPHALEXIN 500 MG: 500 CAPSULE ORAL at 12:52

## 2023-09-07 RX ADMIN — TOLTERODINE 4 MG: 4 CAPSULE, EXTENDED RELEASE ORAL at 08:43

## 2023-09-07 RX ADMIN — BUMETANIDE 3 MG: 2 TABLET ORAL at 08:43

## 2023-09-07 RX ADMIN — Medication 12.5 MG: at 08:43

## 2023-09-07 RX ADMIN — HYDROMORPHONE HYDROCHLORIDE 0.2 MG: 0.2 INJECTION, SOLUTION INTRAMUSCULAR; INTRAVENOUS; SUBCUTANEOUS at 18:24

## 2023-09-07 RX ADMIN — MICONAZOLE NITRATE: 20 POWDER TOPICAL at 13:00

## 2023-09-07 RX ADMIN — LOSARTAN POTASSIUM 25 MG: 25 TABLET, FILM COATED ORAL at 08:43

## 2023-09-07 RX ADMIN — INSULIN GLARGINE 26 UNITS: 100 INJECTION, SOLUTION SUBCUTANEOUS at 08:53

## 2023-09-07 RX ADMIN — METOPROLOL SUCCINATE 25 MG: 25 TABLET, EXTENDED RELEASE ORAL at 21:40

## 2023-09-07 ASSESSMENT — ACTIVITIES OF DAILY LIVING (ADL)
ADLS_ACUITY_SCORE: 61
ADLS_ACUITY_SCORE: 63
ADLS_ACUITY_SCORE: 61
ADLS_ACUITY_SCORE: 61
ADLS_ACUITY_SCORE: 63
ADLS_ACUITY_SCORE: 63
ADLS_ACUITY_SCORE: 64
ADLS_ACUITY_SCORE: 64
ADLS_ACUITY_SCORE: 53
ADLS_ACUITY_SCORE: 64
ADLS_ACUITY_SCORE: 64
ADLS_ACUITY_SCORE: 63

## 2023-09-07 NOTE — PROGRESS NOTES
[id number]    VS: BP (!) 141/98 (BP Location: Left arm, Patient Position: Semi-Moscoso's, Cuff Size: Adult Large)   Pulse 79   Temp 99.1  F (37.3  C) (Oral)   Resp 20   SpO2 96%    O2: Sating >95% on RA, denies SOB/Chest pain    Output: Incontinent of bowel and bladder.   Last BM: 09/05. denies abdominal discomfort. BS active / passing flatus.    Activity: Up with lift.    Skin: WDL except small bruises on the abdomen.    Pain: Denies pain.    CMS: AO x1. Denies numbness and tingling.   Dressing: None.    Diet: Regular diet. Denies nausea/vomiting.    LDA: L PIV on the forearm SL.     Equipment: IV pole, personal belongings, and call light within reach.    Plan: Continue with plan of care.  Pt. able to make needs known.    Additional Info: Pt is combative with cares. Pt has restrictive limb on right arm.  Pt triggered sepsis during shift provider notified.

## 2023-09-07 NOTE — UTILIZATION REVIEW
Concurrent stay review; Secondary Review Determination - [facility]        Under the authority of the Utilization Management Committee, the utilization review process indicated a secondary review on the above patient.  The review outcome is based on review of the medical records, discussions with staff, and applying clinical experience noted on the date of the review.        (x) Observation/outpatient Status Appropriate - Concurrent stay review       RATIONALE FOR DETERMINATION:   78-year-old female with a complex medical history, including HFpEF (EF 45-50%) with a CardioMem implant, CAD status post PCI and CABG in 2018, BELEN treated with CPAP, HTN, HLD, hypothyroidism post-thyroidectomy, T2DM with CKD3, lymphedema, depression, anxiety, insomnia, GERD, gout, chronic pain, COPD, morbid obesity, and left breast DCIS (ER and HI positive) on anastrozole, was admitted after EMS was called for lift assistance due to her inability to ambulate safely. She presented with failure to thrive, generalized weakness, and bilateral lower extremity lymphedema, with a history of declining health and episodes of confusion. Physical therapy recommended placement in a Transitional Care Unit (TCU), and consultations were sought from occupational therapy and social work, along with delirium precautions and a lymphedema therapy consult.     Patient delayed discharge is related to disposition, there is no medical necessity for inpatient admission at the time of this review. If there is a change in patient status, please resend for review.    The information on this document is developed by the utilization review team in order for the business office to ensure compliance.  This only denotes the appropriateness of proper admission status and does not reflect the quality of care rendered.       The definitions of Inpatient Status and Observation Status used in making the determination above are those provided in the  CMS Coverage Manual, Chapter 1 and Chapter 6, section 70.4.       Sincerely,    Reginald Hurtado MD

## 2023-09-07 NOTE — PROGRESS NOTES
Paged by ortho - asked to clarify whether patient with cellulitis concern on wrist as arthrocentesis could potentially seed joint    On exam the wrist is certainly warm relative to the other side and the patient has some limited range of motion. Passive range of motion causes significant pain though I can get about 15 degrees of flexion and extension. There is mild diffuse erythema overlying the dorsal wrist and swelling relative to the other side.         Patient either delirious from lack of sleep, encephalopathic from disease process, or otherwise unable to answer a lot of questions. From reviewing the chart I see that she has been hyperuricemic in the past and required treatment with allopurinol (caused a rash) then febuxostat. Home meds at present do not include a prophylactic.    CRP is elevated in 180s but white count normalized to 10 this AM. Repeat CRP pending. Added on uric acid level, pending.    Gout is leading item on differential given history, but warmth and poor passive range of motion with  keep cellulitis vs joint infection in play. Will communicate uncertainty to ortho and ask them to aspirate - there are areas of the wrist that are non erythematous that would permit access to the joint.    Meet Vigil DO  Hospitalist    Medicine and Pediatrics  [email or web address]  Pager: [phone]  Available on [employer]

## 2023-09-07 NOTE — PHARMACY-ADMISSION MEDICATION HISTORY
Pharmacist Admission Medication History    Admission medication history is complete. The information provided in this note is only as accurate as the sources available at the time of the update.    Medication reconciliation/reorder completed by provider prior to medication history? Yes    Information Source(s): [facility] and Primary Care Office Visit 8/17/23 as well as 6/13 Virtual Visit with Endocrine    Pertinent Information:  Multiple attempts to interview patient made; difficult to follow conversation with ongoing confusion so medication history completed via complete chart review and pharmacy dispense fill data   Confirmed most recent Lantus dose of 26 units daily, as well as Effexor increase on 8/17/23 per PCP    Changes made to PTA medication list:  Added: route to nystatin powder   Deleted: clindamycin topical (course completed)   Changed: Lantus 29-->26 units/day     Allergies reviewed with patient and updates made in EHR: unable to assess    Medication History Completed By: MATT WONG [facility] 9/7/2023 3:11 PM    Prior to Admission medications    Medication Sig Last Dose Taking? Auth Provider Long Term End Date   acetaminophen (TYLENOL) 500 MG tablet Take 1,000 mg by mouth every 6 hours as needed for mild pain Unknown Yes Reported, Patient     anastrozole (ARIMIDEX) 1 MG tablet Take 1 tablet (1 mg) by mouth daily Unknown Yes Opal Weber MD Yes    aspirin (ASA) 81 MG EC tablet Take 1 tablet (81 mg) by mouth daily Unknown Yes Brittany Fuentes PA-C     atorvastatin (LIPITOR) 40 MG tablet TAKE 1 TABLET(40 MG) BY MOUTH IN THE MORNING Unknown Yes Malika Wolf, CHANTAL CNP Yes    bumetanide (BUMEX) 1 MG tablet Take 3 tablets (3 mg) by mouth daily Unknown Yes Austin Miguel APRN CNP Yes    cephALEXin (KEFLEX) 500 MG capsule Take 1 capsule (500 mg) by mouth 2 times daily for 10 days Unknown Yes Bruna Rojas NP  9/15/23   dapagliflozin (FARXIGA) 10 MG TABS  tablet Take 1 tablet (10 mg) by mouth daily Unknown Yes Rea Dominguez MD     ferrous gluconate (FERGON) 324 (38 Fe) MG tablet TAKE 1 TABLET BY MOUTH EVERY MONDAY, WEDNESDAY, AND FRIDAY MORNING. Unknown Yes Malika Wolf APRN CNP     LANTUS SOLOSTAR 100 UNIT/ML soln ADMINISTER 29 UNITS UNDER THE SKIN DAILY  Patient taking differently: Inject 26 Units Subcutaneous every morning Unknown Yes Alejandra Posadas MD Yes    losartan (COZAAR) 25 MG tablet Take 1 tablet (25 mg) by mouth daily Unknown Yes Magdalena Hall PA-C Yes    metoprolol succinate ER (TOPROL XL) 25 MG 24 hr tablet Take 0.5 tablets (12.5 mg) by mouth every morning AND 1 tablet (25 mg) every evening. Unknown Yes Magdalena Hall PA-C Yes    potassium chloride ER (KLOR-CON M) 10 MEQ CR tablet Take 2 tablets (20 mEq) by mouth 2 times daily Unknown Yes Magdalena Hall PA-C No    pregabalin (LYRICA) 100 MG capsule TAKE 1 CAPSULE(100 MG) BY MOUTH TWICE DAILY Unknown Yes Malika Wolf APRN CNP Yes    PROAIR  (90 Base) MCG/ACT inhaler INHALE 2 PUFFS INTO THE LUNGS EVERY 6 HOURS Unknown Yes Malika Wolf APRN CNP Yes    repaglinide (PRANDIN) 1 MG tablet Take 1 tablet (1 mg) by mouth 2 times daily (before meals) Unknown Yes Muriel Will PA-C Yes    SYNTHROID 150 MCG tablet TAKE 1 TABLET(150 MCG) BY MOUTH DAILY Unknown Yes Malika Wolf APRN CNP Yes    tolterodine ER (DETROL LA) 2 MG 24 hr capsule TAKE 2 CAPSULES BY MOUTH ONCE DAILY. Unknown Yes Dahlia Wolfe PA-C     venlafaxine (EFFEXOR XR) 75 MG 24 hr capsule Take 1 capsule (75 mg) by mouth daily Unknown Yes Malika Wolf APRN CNP Yes    Continuous Blood Gluc  (FREESTYLE MARTY 14 DAY READER) JEZ 1 Units 4 times daily (before meals and nightly)   Amee Connell MD Yes    Continuous Blood Gluc Sensor (FREESTYLE MARTY 14 DAY SENSOR) MISC PLACE NEW SENSOR TO BACK OF ARM EVERY 14 DAYS TO MONITOR BLOOD SUGARS   Malika Wolf, APRN CNP Yes   "  EPINEPHrine (ANY BX GENERIC EQUIV) 0.3 MG/0.3ML injection 2-pack Inject 0.3 mLs (0.3 mg) into the muscle as needed for anaphylaxis May repeat one time in 5-15 minutes if response to initial dose is inadequate.   Malika Wolf APRN CNP     miconazole (MICATIN/MICRO GUARD) 2 % external powder Apply topically as needed for itching or other Redness in bilateral groin and katharine Jostin Paredes MD No    nitroGLYcerin (NITROSTAT) 0.4 MG sublingual tablet For chest pain place 1 tablet under the tongue every 5 minutes for 3 doses. If symptoms persist 5 minutes after 1st dose call 911.   Stephanie Wolf MD Yes    nystatin (MYCOSTATIN) 833705 UNIT/GM external ointment Apply topically 2 times daily Apply to external vagina 2 times daily.   Malika Wolf APRN CNP     nystatin POWD 1 Dose 4 times daily   Malika Wolf APRN CNP     Skin Protectants, Misc. (INTERDRY AG TEXTILE 10\"X144\") SHEE Externally apply 1 Box topically daily Apply to areas of intertrigo Amee Romano MD Yes        "
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
## 2023-09-07 NOTE — ED NOTES
[facility] crew at bedside, pt report given.  Pt transferred to EMS [facility] and transported out of ED.

## 2023-09-07 NOTE — PROGRESS NOTES
BP (!) 144/66 (BP Location: Right arm)   Pulse 69   Temp 97.3  F (36.3  C) (Oral)   Resp 20   SpO2 98%     [id number]    Pt A&O to self only. L PIV SL. R wrist pain. Assist of 3 with repositioning and ralph cares. Incontinent bowel and bladder -- last BM per report 9/5. Redness noted in groin; barrier cream applied. Redness noted on coccyx; mepilex applied. Denies SOB, chest pain, nausea/vomiting, and numbness/tingling. No acute events. Pt expresses no needs at this time call light in reach. Needs assistance ordering.

## 2023-09-07 NOTE — PROGRESS NOTES
6MS ADMISSION    Writer received report from Naila VERA at 1736 rom [place] [facility]     D: Patient admitted/transferred from [place] [facility] at 1957 via EMS  for ambulation dysfunction.     I: Upon arrival to the unit patient was oriented to room, unit, and call light. Patient s height, weight, and vital signs were not obtained as patient is refusing Allergies reviewed and allergy band applied. Provider notified of patient s arrival on the unit. Adult AVS completed. Head to toe assessment completed. Education assessment completed. Care plan initiated.    A: Pt refused vital signs. Patient rates pain at s/sx of pain absent patient is calm and refusing to answer questions room mate answering questions for her. Two RN skin assessment completed not completed will pass on to on coming nurse     P: Continue to monitor patient s change in functional status  and intervene as needed. Continue with plan of care. Notify provider with any concerns or changes in patient status.

## 2023-09-07 NOTE — PLAN OF CARE
Goal Outcome Evaluation:      Plan of Care Reviewed With: patient, caregiver    Overall Patient Progress: no changeOverall Patient Progress: no change    Outcome Evaluation: Pt admitted at 1957 and has remained the same. Pt is still refusing vitals and not able to answer questions. Pt is not able to ambulate at this time.     VS:    O2: Sating >95% on RA, denies SOB/Chest pain   Output: Voids incontinent     Last BM: 09/5/23 bowel sounds normoactive x4   Activity: Up with lift Pt not out of bed    Up for meals? Yes   Skin: All visible skin intact lymphedema wraps in place small brushing writer unable to visualize all skin    Pain: Pain was managed with no pain medication given    CMS: AO x4, Denies N/V   Dressing: None   Diet: Regular diet   LDA: PIV to L forarm SL   Equipment:  IV pole, personal belongings   Plan: Call light within reach, bed in a low position.not  able to make needs known. Staff to anticipate needs. Continue to monitor with POC.   Additional Info:  Pt refused vital signs and is combative with cares. Pt has restrictive limb on right arm.

## 2023-09-07 NOTE — PROGRESS NOTES
[facility]    Medicine Progress Note - Hospitalist Service, GOLD TEAM 22    Date of Admission:  9/6/2023    Assessment & Plan      Mariel Ribeiro is a 78 year old female patient with a past medical history significant for HFpEF (EF 45-50%) s/p CardioMem implant (goal PA diastolic 14-18), CAD s/p PCI x 2 to LAD and CABG 2018 (LIMA-> LAD, SVG->OM2 and RPDA), BELEN on CPAP, HTN, HLD, thyroid cancer s/p thyroidectomy with resultant hypothyroidism, BELEN, T2DM c/b CKD3, lymphedema, depression, anxiety, insomnia, GERD, gout, chronic pain, COPD, morbid obesity, Left Breast DCIS (biopsy 11/2019 w/ ER positive, AL positive, grade II, papillary and solid type DCIS, started anastrozole 6/2020) who presented to [facility] ED after EMS was called for lift assistance (patient unable to safely ambulate into home) and subsequently patient was admitted for placement.    Failure to Thrive with Generalized Weakness  Bilateral Lower Extremity Lymphedema  Encephalopathy of uncertain etiology  Confused/encephalopathic, waxing/waning and present prior to admission per admitting providers; will call roommate for more context today --> will get VBG to check for CO2 narcosis given not using home CPAP, appears she's had neuropsychiatric testing and they felt it was multifactorial but couldn't establish definite etiology --> add on a TSH and confirmed dose of effexor which was recently increased; awaiting urinalysis, no respiratory symptoms to implicate lung infection   - PT evaluated patient in ED and recommending TCU placement   - OT and social work consults   - Delirium precautions   - Lymphedema therapy consult    R Hand Cellulitis vs Gout  - ortho consulted to tap wrist, ultrasound without fluid and only safe access to wrist with needle would go through area of erythema, ortho doesn't want to seed joint  - will resume keflex (started 9/5, not sure how many doses she took) and treat for 5 d  -  uric acid is 10 and patient with history of gout formerly on allopurinol (rash) then febuxostat (not sure why stopped); given warmth of wrist and pain with passive ROM will treat for gout and leave joint aspiration up to discretion of ortho --> give colchicine 1.2 mg now and 0.6 mg one hour from now, then 0.6 mg bid; would treat with steroid, too, if could confirm presence of crystals in joint    Chronic Hypercalcemia - Ca 10.8 today  Noted Ca persistently elevated for the past 1-2 years. Currently 10.6 - 9/7/22 PTH 78, Vit D 50. SPEP done 12/19/22. Suspicion was related to low mobility and diuresis.   - iCal high normal   - Caution with any aggressive IVF as patient with significant history of heart failure and lymphedema.    Longstanding HFpEF (recent echo EF 45-50%)  BELEN on CPAP  HTN and HLD  Stage C. NYHA Class IIIb- although confounded by comorbidities. Remote PA Pressure Monitoring (CardioMems) Implanted (Date 7/2019).   - BP: Continue losartan 25mg daily and metoprolol xl 12.5mg QAM and 25mg QPM   - Fluid status: Euvolemic, continue PTA Bumex 3mg daily   - SGLT2i: Hold Jardiance 10mg daily for now   - NSAID use: Contraindicated   - Sleep apnea evaluation: Uses CPAP nightly   - Continue PTA statin   - Monitor I&O and daily weights     CAD  CAD s/p PCI x 2 to LAD and CABG 2018 (LIMA-> LAD, SVG->OM2 and RPDA).  - Continue ASA, statin, beta-blocker as above     CKD stage IIIb  Baseline creatinine variable but appears to be 1.4-1.7. Cr at her baseline today.   - BMP in am   - Monitor I&O   - Avoid nephrotoxic meds as able     T2DM  Last A1C 7.6 8/21/23. PTA regimen: Lantus 26 units daily, Prandin/ repaglinide: 1mg with breakfast & 1mg with dinner if includes a Blizzard or BG before dinner is >140, Farxiga 10mg each morning.   - Hold Prandin and Farxiga for now   - Continue PTA lantus 26 units daily   - Monitor BG before meals and at bedtime   - Medium sliding scale insulin available if needed    Left Breast  DCIS  Biopsy 11/2019 w/ ER positive, IA positive, grade II, papillary and solid type DCIS, started anastrozole 6/2020, she declined surgical intervention.   - Continue PTA Anastrozole     Chronic Pain   - Continue PTA Lyrica    Hypothyroidism   - Continue PTA Synthroid    Depression   - Continue PTA Effexor    Urinary Urgency   - Continue PTA Tolterodine       Diet: Regular Diet Adult    DVT Prophylaxis: Heparin SQ  Gutierrez Catheter: Not present  Lines: None     Cardiac Monitoring: None  Code Status: Full Code      Clinically Significant Risk Factors Present on Admission           # Hypercalcemia: Highest Ca = 10.8 mg/dL in last 2 days, will monitor as appropriate      # Drug Induced Platelet Defect: home medication list includes an antiplatelet medication   # Hypertension: Noted on problem list     # DMII: A1C = 7.6 % (Ref range: <5.7 %) within past 6 months        # History of CABG: noted on surgical history       Disposition Plan      Expected Discharge Date: 09/08/2023      Destination: nursing home;home with help/services;home            Meet Vigil DO  Hospitalist Service, GOLD TEAM 22  [facility]  Securely message with [employer] (more info)  Text page via [facility] Paging/Directory   See signed in provider for up to date coverage information  ______________________________________________________________________    Interval History   Roommate says that normally she has trouble answering questions and confusion that waxes/wanes. Has been going on about 1.5 years. Evaluated for Alzheimer's and doctor didn't think that was what was going on - thought possibly combination of medical comorbids producing cognitive decline.  Had exhausting couple of days leading up to hospitalization, roommate thinks that is why she's so encephalopathic at the moment.   Similar cognitive disintegration a year ago with hospitalization that resolved with discharge and home therapies after  TCU stay.  History of BELEN and doesn't use CPAP at home.      Physical Exam   Vital Signs: Temp: 97.3  F (36.3  C) Temp src: Oral BP: (!) 144/66 Pulse: 69   Resp: 20 SpO2: 98 % O2 Device: Nasal cannula Oxygen Delivery: 1 LPM  Weight: 0 lbs 0 oz        Medical Decision Making             Data

## 2023-09-07 NOTE — PROGRESS NOTES
Brief Orthopaedic Surgery Update    Patient was re-examined this afternoon. Her erythema over her dorsum of the wrist is minimal and her exam remains concerning for possible septic arthritis given her limitations in active/passive ROM. While no effusion was noted on US, her swelling and limitations in Rom are clinically consistent with septic arthritis. A joint aspiration would be needed in order to rule out.     We discussed with the patient and her friend present the risks and benefits of a joint aspiration. They elected to proceed.     Brief Procedure Note:  After verbal informed consent was obtained, and the patient elected to proceed, a brief time out was held in accordance with hospital policy confirming the correct patient, procedure, site, and side. 5cc of lidocaine was injected subcutaneously into the dorsum of the wrist. The right wrist was then prepped and draped in the usual sterile fashion using chlorhexidine. The right wrist was then entered via a dorsal approach with a 18 gauge needle. The joint was then aspirated with return of 2 mL of bloody synovial fluid. The synovial fluid was noted to have white chalky substance aspiratedThe needle was withdrawn, and a sterile bandage was placed. The patient tolerated the procedure well and no immediate complications were observed.     We have sent the synovial fluid for analysis.     We will continue to follow this patient.     Respectfully,    Evens Singh MD  Orthopedic Surgery PGY1  [phone]    Please page me directly with any questions/concerns during regular weekday hours before 5 pm. If there is no response, if it is a weekend, or if it is during evening hours then please page the orthopedic surgery resident on call.

## 2023-09-08 ENCOUNTER — APPOINTMENT (OUTPATIENT)
Dept: PHYSICAL THERAPY | Facility: CLINIC | Age: 78
End: 2023-09-08
Payer: MEDICARE

## 2023-09-08 LAB
ALBUMIN SERPL BCG-MCNC: 3.3 G/DL (ref 3.5–5.2)
ANION GAP SERPL CALCULATED.3IONS-SCNC: 13 MMOL/L (ref 7–15)
BUN SERPL-MCNC: 50.4 MG/DL (ref 8–23)
CALCIUM SERPL-MCNC: 9.7 MG/DL (ref 8.8–10.2)
CHLORIDE SERPL-SCNC: 103 MMOL/L (ref 98–107)
CREAT SERPL-MCNC: 1.76 MG/DL (ref 0.51–0.95)
CRP SERPL-MCNC: 113.35 MG/L
DEPRECATED HCO3 PLAS-SCNC: 24 MMOL/L (ref 22–29)
EGFRCR SERPLBLD CKD-EPI 2021: 29 ML/MIN/1.73M2
ERYTHROCYTE [DISTWIDTH] IN BLOOD BY AUTOMATED COUNT: 13.2 % (ref 10–15)
ERYTHROCYTE [SEDIMENTATION RATE] IN BLOOD BY WESTERGREN METHOD: 53 MM/HR (ref 0–30)
GLUCOSE BLDC GLUCOMTR-MCNC: 202 MG/DL (ref 70–99)
GLUCOSE BLDC GLUCOMTR-MCNC: 214 MG/DL (ref 70–99)
GLUCOSE BLDC GLUCOMTR-MCNC: 246 MG/DL (ref 70–99)
GLUCOSE BLDC GLUCOMTR-MCNC: 259 MG/DL (ref 70–99)
GLUCOSE SERPL-MCNC: 206 MG/DL (ref 70–99)
GRAM STAIN RESULT: NORMAL
GRAM STAIN RESULT: NORMAL
HCT VFR BLD AUTO: 37.9 % (ref 35–47)
HGB BLD-MCNC: 12 G/DL (ref 11.7–15.7)
MAGNESIUM SERPL-MCNC: 2 MG/DL (ref 1.7–2.3)
MCH RBC QN AUTO: 28.3 PG (ref 26.5–33)
MCHC RBC AUTO-ENTMCNC: 31.7 G/DL (ref 31.5–36.5)
MCV RBC AUTO: 89 FL (ref 78–100)
PHOSPHATE SERPL-MCNC: 3.6 MG/DL (ref 2.5–4.5)
PLATELET # BLD AUTO: 177 10E3/UL (ref 150–450)
POTASSIUM SERPL-SCNC: 3.6 MMOL/L (ref 3.4–5.3)
RBC # BLD AUTO: 4.24 10E6/UL (ref 3.8–5.2)
SODIUM SERPL-SCNC: 140 MMOL/L (ref 136–145)
WBC # BLD AUTO: 7.9 10E3/UL (ref 4–11)

## 2023-09-08 PROCEDURE — G0378 HOSPITAL OBSERVATION PER HR: HCPCS

## 2023-09-08 PROCEDURE — 86140 C-REACTIVE PROTEIN: CPT | Performed by: PEDIATRICS

## 2023-09-08 PROCEDURE — 97530 THERAPEUTIC ACTIVITIES: CPT | Mod: GP | Performed by: PHYSICAL THERAPIST

## 2023-09-08 PROCEDURE — 80069 RENAL FUNCTION PANEL: CPT | Performed by: PEDIATRICS

## 2023-09-08 PROCEDURE — 85027 COMPLETE CBC AUTOMATED: CPT | Performed by: PEDIATRICS

## 2023-09-08 PROCEDURE — 250N000013 HC RX MED GY IP 250 OP 250 PS 637: Performed by: PEDIATRICS

## 2023-09-08 PROCEDURE — 250N000013 HC RX MED GY IP 250 OP 250 PS 637: Performed by: PHYSICIAN ASSISTANT

## 2023-09-08 PROCEDURE — 36415 COLL VENOUS BLD VENIPUNCTURE: CPT | Performed by: PEDIATRICS

## 2023-09-08 PROCEDURE — 82962 GLUCOSE BLOOD TEST: CPT

## 2023-09-08 PROCEDURE — 85652 RBC SED RATE AUTOMATED: CPT

## 2023-09-08 PROCEDURE — 96372 THER/PROPH/DIAG INJ SC/IM: CPT | Performed by: PHYSICIAN ASSISTANT

## 2023-09-08 PROCEDURE — 99233 SBSQ HOSP IP/OBS HIGH 50: CPT | Performed by: PEDIATRICS

## 2023-09-08 PROCEDURE — 97116 GAIT TRAINING THERAPY: CPT | Mod: GP | Performed by: PHYSICAL THERAPIST

## 2023-09-08 PROCEDURE — 83735 ASSAY OF MAGNESIUM: CPT | Performed by: PEDIATRICS

## 2023-09-08 PROCEDURE — 250N000011 HC RX IP 250 OP 636: Performed by: PHYSICIAN ASSISTANT

## 2023-09-08 RX ADMIN — TOLTERODINE 4 MG: 4 CAPSULE, EXTENDED RELEASE ORAL at 09:04

## 2023-09-08 RX ADMIN — HEPARIN SODIUM 5000 UNITS: 5000 INJECTION, SOLUTION INTRAVENOUS; SUBCUTANEOUS at 05:11

## 2023-09-08 RX ADMIN — LEVOTHYROXINE SODIUM 150 MCG: 75 TABLET ORAL at 09:03

## 2023-09-08 RX ADMIN — BUMETANIDE 3 MG: 2 TABLET ORAL at 09:03

## 2023-09-08 RX ADMIN — VENLAFAXINE HYDROCHLORIDE 75 MG: 75 CAPSULE, EXTENDED RELEASE ORAL at 09:04

## 2023-09-08 RX ADMIN — CEPHALEXIN 500 MG: 500 CAPSULE ORAL at 09:04

## 2023-09-08 RX ADMIN — ANASTROZOLE 1 MG: 1 TABLET, COATED ORAL at 09:04

## 2023-09-08 RX ADMIN — LOSARTAN POTASSIUM 25 MG: 25 TABLET, FILM COATED ORAL at 09:04

## 2023-09-08 RX ADMIN — CEPHALEXIN 500 MG: 500 CAPSULE ORAL at 22:31

## 2023-09-08 RX ADMIN — HEPARIN SODIUM 5000 UNITS: 5000 INJECTION, SOLUTION INTRAVENOUS; SUBCUTANEOUS at 14:02

## 2023-09-08 RX ADMIN — PREGABALIN 100 MG: 100 CAPSULE ORAL at 22:31

## 2023-09-08 RX ADMIN — POLYETHYLENE GLYCOL 3350 17 G: 17 POWDER, FOR SOLUTION ORAL at 14:02

## 2023-09-08 RX ADMIN — COLCHICINE 0.6 MG: 0.6 TABLET, FILM COATED ORAL at 09:04

## 2023-09-08 RX ADMIN — HEPARIN SODIUM 5000 UNITS: 5000 INJECTION, SOLUTION INTRAVENOUS; SUBCUTANEOUS at 22:31

## 2023-09-08 RX ADMIN — Medication 12.5 MG: at 09:03

## 2023-09-08 RX ADMIN — MICONAZOLE NITRATE: 20 POWDER TOPICAL at 23:41

## 2023-09-08 RX ADMIN — METOPROLOL SUCCINATE 25 MG: 25 TABLET, EXTENDED RELEASE ORAL at 22:30

## 2023-09-08 RX ADMIN — INSULIN ASPART 1 UNITS: 100 INJECTION, SOLUTION INTRAVENOUS; SUBCUTANEOUS at 23:40

## 2023-09-08 RX ADMIN — PREGABALIN 100 MG: 100 CAPSULE ORAL at 09:04

## 2023-09-08 RX ADMIN — ASPIRIN 81 MG: 81 TABLET, COATED ORAL at 09:03

## 2023-09-08 RX ADMIN — INSULIN GLARGINE 26 UNITS: 100 INJECTION, SOLUTION SUBCUTANEOUS at 09:07

## 2023-09-08 RX ADMIN — FERROUS GLUCONATE 324 MG: 324 TABLET ORAL at 09:03

## 2023-09-08 RX ADMIN — MICONAZOLE NITRATE: 20 POWDER TOPICAL at 09:05

## 2023-09-08 ASSESSMENT — ACTIVITIES OF DAILY LIVING (ADL)
ADLS_ACUITY_SCORE: 69
ADLS_ACUITY_SCORE: 66
ADLS_ACUITY_SCORE: 69
ADLS_ACUITY_SCORE: 65
ADLS_ACUITY_SCORE: 65

## 2023-09-08 NOTE — PLAN OF CARE
0042 sent a message to the ortho resident regarding the cancellation for synovial fluid tests      VS: Stable vital signs    O2: Room air    Output: Incontinent of bladder    Last BM: 09/05/2023   Activity: On bed    Skin: Edema on bilateral lower extremities; With bruise on right forearm, wrist, hand   Pain: 0/10; no other nonverbal cues of pain    CMS: Alert and oriented x 1    Dressing: With mepilex border on the bottom for protection   Diet: Regular diet   LDA: L PIV    Equipment: Monitor    Plan: Rule out possible septic arthritis    Additional Info: Standard precautions       0200 asleep on bed, no other nonverbal cues of pain, unlabored breathing, with rise and fall of the chest     0300 asleep on bed, no other nonverbal cues of pain, unlabored breathing, with rise and fall of the chest    0445 changed the diaper

## 2023-09-08 NOTE — PROGRESS NOTES
[facility]    Medicine Progress Note - Hospitalist Service, GOLD TEAM 22    Date of Admission:  9/6/2023    Assessment & Plan      Mariel Ribeiro is a 78 year old female patient with a past medical history significant for HFpEF (EF 45-50%) s/p CardioMem implant (goal PA diastolic 14-18), CAD s/p PCI x 2 to LAD and CABG 2018 (LIMA-> LAD, SVG->OM2 and RPDA), BELEN on CPAP, HTN, HLD, thyroid cancer s/p thyroidectomy with resultant hypothyroidism, BELEN, T2DM c/b CKD3, lymphedema, depression, anxiety, insomnia, GERD, gout, chronic pain, COPD, morbid obesity, Left Breast DCIS (biopsy 11/2019 w/ ER positive, [place] positive, grade II, papillary and solid type DCIS, started anastrozole 6/2020) who presented to [facility] ED after EMS was called for lift assistance (patient unable to safely ambulate into home) and subsequently patient was admitted for placement.    Dispo a little unclear to me since she's still so encephalopathic without a clear etiology.     Failure to Thrive with Generalized Weakness  Bilateral Lower Extremity Lymphedema  Encephalopathy of uncertain etiology  Confused/encephalopathic, waxing/waning and present prior to admission per admitting providers; will call roommate for more context today --> will get VBG to check for CO2 narcosis given not using home CPAP, appears she's had neuropsychiatric testing and they felt it was multifactorial but couldn't establish definite etiology --> add on a TSH and confirmed dose of effexor which was recently increased; awaiting urinalysis, no respiratory symptoms to implicate lung infection   - PT evaluated patient in ED and recommending TCU placement   - OT and social work consults   - Delirium precautions   - Lymphedema therapy consult  - will straight cath to get urine for urinalysis and culture at this point, it's been 2 days     R Hand Cellulitis vs Gout  - ortho consulted to tap wrist, ultrasound without fluid and only  safe access to wrist with needle would go through area of erythema, ortho doesn't want to seed joint  - will resume keflex (started 9/5, not sure how many doses she took) and treat for 5 d  - uric acid is 10 and patient with history of gout formerly on allopurinol (rash) then febuxostat (not sure why stopped); given warmth of wrist and pain with passive ROM will treat for gout and leave joint aspiration up to discretion of ortho --> give colchicine 1.2 mg now and 0.6 mg one hour from now, then 0.6 mg bid; would treat with steroid, too, if could confirm presence of crystals in joint    Chronic Hypercalcemia - Ca 10.8 today  Noted Ca persistently elevated for the past 1-2 years. Currently 10.6 - 9/7/22 PTH 78, Vit D 50. SPEP done 12/19/22. Suspicion was related to low mobility and diuresis.   - iCal high normal   - Caution with any aggressive IVF as patient with significant history of heart failure and lymphedema.    Longstanding HFpEF (recent echo EF 45-50%)  BELEN on CPAP  HTN and HLD  Stage C. NYHA Class IIIb- although confounded by comorbidities. Remote PA Pressure Monitoring (CardioMems) Implanted (Date 7/2019).   - BP: Continue losartan 25mg daily and metoprolol xl 12.5mg QAM and 25mg QPM   - Fluid status: Euvolemic, continue PTA Bumex 3mg daily   - SGLT2i: Hold Jardiance 10mg daily for now   - NSAID use: Contraindicated   - Sleep apnea evaluation: Uses CPAP nightly   - Continue PTA statin   - Monitor I&O and daily weights     CAD  CAD s/p PCI x 2 to LAD and CABG 2018 (LIMA-> LAD, SVG->OM2 and RPDA).  - Continue ASA, statin, beta-blocker as above     CKD stage IIIb  Baseline creatinine variable but appears to be 1.4-1.7. Cr at her baseline today.   - BMP in am   - Monitor I&O   - Avoid nephrotoxic meds as able     T2DM  Last A1C 7.6 8/21/23. PTA regimen: Lantus 26 units daily, Prandin/ repaglinide: 1mg with breakfast & 1mg with dinner if includes a Blizzard or BG before dinner is >140, Farxiga 10mg each morning.    - Hold Prandin and Farxiga for now   - Continue PTA lantus 26 units daily   - Monitor BG before meals and at bedtime   - Medium sliding scale insulin available if needed    Left Breast DCIS  Biopsy 11/2019 w/ ER positive, MI positive, grade II, papillary and solid type DCIS, started anastrozole 6/2020, she declined surgical intervention.   - Continue PTA Anastrozole     Chronic Pain   - Continue PTA Lyrica    Hypothyroidism   - Continue PTA Synthroid    Depression   - Continue PTA Effexor    Urinary Urgency   - Continue PTA Tolterodine       Diet: Regular Diet Adult    DVT Prophylaxis: Heparin SQ  Gutierrez Catheter: Not present  Lines: None     Cardiac Monitoring: None  Code Status: Full Code      Clinically Significant Risk Factors Present on Admission           # Hypercalcemia: Highest Ca = 10.8 mg/dL in last 2 days, will monitor as appropriate    # Hypoalbuminemia: Lowest albumin = 3.3 g/dL at 9/8/2023  7:14 AM, will monitor as appropriate     # Drug Induced Platelet Defect: home medication list includes an antiplatelet medication     # Hypertension: Noted on problem list     # DMII: A1C = 7.6 % (Ref range: <5.7 %) within past 6 months          # History of CABG: noted on surgical history       Disposition Plan      Expected Discharge Date: 09/09/2023      Destination: nursing home;home with help/services;home            Meet Vigil DO  Hospitalist Service, GOLD TEAM [address] [place] [address]  Securely message with [employer] (more info)  Text page via [facility] Paging/Directory   See signed in provider for up to date coverage information  ______________________________________________________________________    Interval History   She's still pretty out of it this morning. Oriented only to first name, not to location or time. Pain with passive manipulation of wrist.     Physical Exam   Vital Signs: Temp: 98.2  F (36.8  C) Temp src: Oral BP: 128/51 Pulse: 85   Resp: 18 SpO2: 93 % O2  Device: None (Room air)    Weight: 294 lbs 15.61 oz    Sitting up in no distress until right wrist manipulated  Awake, minimally alert but does respond; oriented to person only  Breathing rate and work of breathing are normal    Medical Decision Making             Data     Lab on 08/21/2023   Component Date Value Ref Range Status    Hemoglobin A1C 08/21/2023 7.6 (H)  <5.7 % Final    Normal <5.7%   Prediabetes 5.7-6.4%    Diabetes 6.5% or higher     Note: Adopted from ADA consensus guidelines.    WBC Count 08/21/2023 7.5  4.0 - 11.0 10e3/uL Final    RBC Count 08/21/2023 4.48  3.80 - 5.20 10e6/uL Final    Hemoglobin 08/21/2023 12.7  11.7 - 15.7 g/dL Final    Hematocrit 08/21/2023 40.1  35.0 - 47.0 % Final    MCV 08/21/2023 90  78 - 100 fL Final    MCH 08/21/2023 28.3  26.5 - 33.0 pg Final    MCHC 08/21/2023 31.7  31.5 - 36.5 g/dL Final    RDW 08/21/2023 13.6  10.0 - 15.0 % Final    Platelet Count 08/21/2023 154  150 - 450 10e3/uL Final    Creatinine Urine mg/dL 08/21/2023 39.9  mg/dL Final    The reference ranges have not been established in urine creatinine. The results should be integrated into the clinical context for interpretation.    Albumin Urine mg/L 08/21/2023 66.4  mg/L Final    The reference ranges have not been established in urine albumin. The results should be integrated into the clinical context for interpretation.    Albumin Urine mg/g Cr 08/21/2023 166.42 (H)  0.00 - 25.00 mg/g Cr Final    Microalbuminuria is defined as an albumin:creatinine ratio of 17 to 299 for males and 25 to 299 for females. A ratio of albumin:creatinine of 300 or higher is indicative of overt proteinuria.  Due to biologic variability, positive results should be confirmed by a second, first-morning random or 24-hour timed urine specimen. If there is discrepancy, a third specimen is recommended. When 2 out of 3 results are in the microalbuminuria range, this is evidence for incipient nephropathy and warrants increased efforts at  glucose control, blood pressure control, and institution of therapy with an angiotensin-converting-enzyme (ACE) inhibitor (if the patient can tolerate it).      N Terminal Pro BNP Outpatient 08/21/2023 983  0 - 1,800 pg/mL Final    Reference range shown and results flagged as abnormal are for the outpatient, non acute settings. Establishing a baseline value for each individual patient is useful for follow-up.    Suggested inpatient cut points for confirming diagnosis of CHF in an acute setting are:  >450 pg/mL (age 18 to less than 50)  >900 pg/mL (age 50 to less than 75)  >1800 pg/mL (75 yrs and older)    An inpatient or emergency department NT-proPBNP <300 pg/mL effectively rules out acute CHF, with 99% negative predictive value.        Sodium 08/21/2023 143  136 - 145 mmol/L Final    Potassium 08/21/2023 4.0  3.4 - 5.3 mmol/L Final    Chloride 08/21/2023 103  98 - 107 mmol/L Final    Carbon Dioxide (CO2) 08/21/2023 28  22 - 29 mmol/L Final    Anion Gap 08/21/2023 12  7 - 15 mmol/L Final    Urea Nitrogen 08/21/2023 37.4 (H)  8.0 - 23.0 mg/dL Final    Creatinine 08/21/2023 1.63 (H)  0.51 - 0.95 mg/dL Final    Calcium 08/21/2023 10.3 (H)  8.8 - 10.2 mg/dL Final    Glucose 08/21/2023 164 (H)  70 - 99 mg/dL Final    GFR Estimate 08/21/2023 32 (L)  >60 mL/min/1.73m2 Final    Pregabalin 08/21/2023 Present (A)  Absent Final    Sources of pregabalin are prescription medications.    Creatinine Urine for Drug Screen 08/21/2023 41  mg/dL Final    The reference range has not been established for creatinine in random urines. The results should be integrated into the clinical context for interpretation.

## 2023-09-08 NOTE — PLAN OF CARE
Goal Outcome Evaluation:       Overall Patient Progress: no change       VS:  /82 (BP Location: Left arm, Patient Position: Semi-Moscoso's, Cuff Size: Adult Large)   Pulse 85   Temp 98.2  F (36.8  C) (Oral)   Resp 21   Wt 132.1 kg (291 lb 3.6 oz)   SpO2 92%   BMI 45.61 kg/m      O2:  On RA   Output:  Incontinent b&b; incontinence pad   Last BM:  9/5 per report; LINDA d/t confusion   Activity:  A3; NOOB this shift   Skin:  Redness & rash perineum & groin, barrier applied   Bruise BUE hands   Pain:  R wrist pain; headache managed w/ PRN Tylenol   CMS:  Alerted only to self; disoriented to time, place, & situation;   Pt unable to complete a coherent sentence & often rambled off w/ flight of ideas   Dressing:  Mepilex on buttock for prophylaxis   Diet:  Reg w/ thin; denied N/V   LDA:  L PIV SL   Equipment:  Personal belongings   Plan:  Continue POC   Additional Info:  Pt often refuses BP d/t excruciating pain when BP cuff inflates, roommate requested to not check pt's BP overnight so pt could sleep

## 2023-09-08 NOTE — UTILIZATION REVIEW
"Admission Status; Secondary Review Determination     Admission Date: 9/6/2023  9:51 AM       Under the authority of the Utilization Management Committee, the utilization review process indicated a secondary review on the above patient.  The review outcome is based on review of the medical records, discussions with staff, and applying clinical experience noted on the date of the review.          (x) Observation Status Appropriate - This patient does not meet hospital inpatient criteria and is placed in observation status. If this patient's primary payer is Medicare and was admitted as an inpatient, Condition Code 44 should be used and patient status changed to \"observation\".       RATIONALE FOR DETERMINATION      Brief clinical presentation, information copied from the chart, abbreviated and edited for relevant content:     Other than LOS, no inpatient criteria noted.     Mariel Ribeiro is a 78 year old female patient with a past medical history significant for HFpEF (EF 45-50%) s/p CardioMem implant (goal PA diastolic 14-18), CAD s/p PCI x 2 to LAD and CABG 2018 (LIMA-> LAD, SVG->OM2 and RPDA), BELEN on CPAP, HTN, HLD, thyroid cancer s/p thyroidectomy with resultant hypothyroidism, BELEN, T2DM c/b CKD3, lymphedema, depression, anxiety, insomnia, GERD, gout, chronic pain, COPD, morbid obesity, Left Breast DCIS (biopsy 11/2019 w/ ER positive, IA positive, grade II, papillary and solid type DCIS, started anastrozole 6/2020) who presented to [facility] ED after EMS was called for lift assistance (patient unable to safely ambulate into home) and subsequently patient was admitted for placement. Still with some encephalopathy of unclear etiology, no clear infection of other cause.    PT evaluated patient in ED and recommending TCU placement  OT and social work consults.        The severity of illness, intensity of cares provided, risk for adverse outcome, and expected LOS make the care appropriate for observation.     "   The information on this document is developed by the utilization review team in order for the business office to ensure compliance.  This only denotes the appropriateness of proper admission status and does not reflect the quality of care rendered.         The definitions of Inpatient Status and Observation Status used in making the determination above are those provided in the CMS Coverage Manual, Chapter 1 and Chapter 6, section 70.4.      Sincerely,     Vero Rodriguez MD   Utilization Review/ Case Management  [facility].

## 2023-09-08 NOTE — PROGRESS NOTES
Orthopedic Surgery Progress Note: 09/08/2023    Subjective:   No acute events overnight. Pain well-controlled. Tolerating a diet. Was not alert for exam this am. Did not answer questions but expressed pain with any movement of wrist.     Objective:   /66 (BP Location: Left arm)   Pulse 85   Temp 99.1  F (37.3  C) (Oral)   Resp 18   Wt 133.8 kg (294 lb 15.6 oz)   SpO2 95%   BMI 46.20 kg/m    No intake/output data recorded.  General: NAD. Resting comfortably in bed. Exam limited as patient was not very alert and would not answer questions or follow commands.   Respiratory: Nonlabored breathing  Musculoskeletal:    Focused Exam of Right Upper Extremity  Mildly decreased dorsal swelling, erythema   TTP along radial wrist, non-tender to palpation along ulnar wrist  ROM passive Flexion/Extension: 5/5. Does not follow commands and unable to assess active ROM.   Fires FPL, EPL, IO.   Unable to assess sensation  2+ Radial pulse, hand WWP    Laboratory Data:  Lab Results   Component Value Date    WBC 9.8 09/07/2023    HGB 13.9 09/07/2023     09/07/2023    INR 1.01 05/12/2020     Cultures:  09/07 right wrist arthrocentesis: Gram stain negative. Cx Pending    Assessment & Plan:   Mariel Rbieiro is a 78 year old female with 78yof with PMH of HFpEF (EF 45-50%) s/p CardioMem implant (goal PA diastolic 14-18), CAD s/p PCI x 2 to LAD and CABG 2018 (LIMA-> LAD, SVG->OM2 and RPDA), and gout presented with right wrist pain and swelling. She had been treated at urgent care on 09/05 for cellulitis after 5 days of right wrist swelling. We were consulted to r/o septic arthritis. A right wrist aspiration was attempted on 09/07.     Plan for Today:  Follow cultures  Trend ESR/CRP  Follow clinically    Medicine Primary  - Plan for OR: None at this time  - Anticoagulation/DVT prophylaxis: Defer to primary  - Antibiotics: Agree with plan for Keflex during admission  - Imaging: Reviewed R Wrist Xrs and US  - Activity: No  restrictions  - Weight bearing: WBAT  - Pain control: Defer to primary  - Diet: Regular Diet  - Follow-up: None needed  - Disposition: Defer to primary     Staff: Gaurang Miller MD    ------------------------------------------------------------------------------------------    Respectfully,    Evens Singh MD  Orthopedic Surgery PGY1  [phone]    Please page me directly with any questions/concerns during regular weekday hours before 5 pm. If there is no response, if it is a weekend, or if it is during evening hours then please page the orthopedic surgery resident on call.      FOLLOWUP:    Future Appointments   Date Time Provider Department [place]   9/8/2023  1:45 PM UR PT REHAB TECH URPT [place]   11/28/2023  4:15 PM [facility]    12/1/2023  2:15 PM Austin Miguel APRN CNP [facility]   6/17/2024  2:30 PM Opal Weber MD [facility]

## 2023-09-08 NOTE — PROGRESS NOTES
Care Management Follow Up    Length of Stay (days): 1    Expected Discharge Date: 09/08/2023     Concerns to be Addressed: discharge planning     Patient plan of care discussed at interdisciplinary rounds: Yes    Anticipated Discharge Disposition: Transitional Care     Anticipated Discharge Services:  short term rehab  Anticipated Discharge DME:      Patient/family educated on Medicare website which has current facility and service quality ratings: yes  Education Provided on the Discharge Plan: Yes  Patient/Family in Agreement with the Plan: yes    Referrals Placed by CM/SW:    Private pay costs discussed: Not applicable if pt stays one more night    Additional Information:    Met with pt and pt friend/roommate Odalis at bedside. SW shared TCU recommendations and answered pt and friend questions. Odalis has TCU list and will choose 5 options. SW will stop by later today to grab recommendations.    EUFEMIA Velásquez BSW  Float   Pager: [phone]

## 2023-09-09 ENCOUNTER — APPOINTMENT (OUTPATIENT)
Dept: PHYSICAL THERAPY | Facility: CLINIC | Age: 78
End: 2023-09-09
Payer: MEDICARE

## 2023-09-09 LAB
ALBUMIN SERPL BCG-MCNC: 3.1 G/DL (ref 3.5–5.2)
ANION GAP SERPL CALCULATED.3IONS-SCNC: 11 MMOL/L (ref 7–15)
BUN SERPL-MCNC: 50.7 MG/DL (ref 8–23)
CALCIUM SERPL-MCNC: 9.4 MG/DL (ref 8.8–10.2)
CHLORIDE SERPL-SCNC: 103 MMOL/L (ref 98–107)
CREAT SERPL-MCNC: 1.67 MG/DL (ref 0.51–0.95)
CRP SERPL-MCNC: 64.53 MG/L
DEPRECATED HCO3 PLAS-SCNC: 24 MMOL/L (ref 22–29)
EGFRCR SERPLBLD CKD-EPI 2021: 31 ML/MIN/1.73M2
ERYTHROCYTE [DISTWIDTH] IN BLOOD BY AUTOMATED COUNT: 12.9 % (ref 10–15)
ERYTHROCYTE [SEDIMENTATION RATE] IN BLOOD BY WESTERGREN METHOD: 47 MM/HR (ref 0–30)
GLUCOSE BLDC GLUCOMTR-MCNC: 135 MG/DL (ref 70–99)
GLUCOSE BLDC GLUCOMTR-MCNC: 176 MG/DL (ref 70–99)
GLUCOSE BLDC GLUCOMTR-MCNC: 216 MG/DL (ref 70–99)
GLUCOSE BLDC GLUCOMTR-MCNC: 225 MG/DL (ref 70–99)
GLUCOSE BLDC GLUCOMTR-MCNC: 258 MG/DL (ref 70–99)
GLUCOSE SERPL-MCNC: 167 MG/DL (ref 70–99)
HCT VFR BLD AUTO: 37.2 % (ref 35–47)
HGB BLD-MCNC: 12 G/DL (ref 11.7–15.7)
MAGNESIUM SERPL-MCNC: 1.8 MG/DL (ref 1.7–2.3)
MCH RBC QN AUTO: 28.4 PG (ref 26.5–33)
MCHC RBC AUTO-ENTMCNC: 32.3 G/DL (ref 31.5–36.5)
MCV RBC AUTO: 88 FL (ref 78–100)
PHOSPHATE SERPL-MCNC: 3.9 MG/DL (ref 2.5–4.5)
PLATELET # BLD AUTO: 151 10E3/UL (ref 150–450)
POTASSIUM SERPL-SCNC: 3.5 MMOL/L (ref 3.4–5.3)
RBC # BLD AUTO: 4.22 10E6/UL (ref 3.8–5.2)
SODIUM SERPL-SCNC: 138 MMOL/L (ref 136–145)
WBC # BLD AUTO: 7 10E3/UL (ref 4–11)

## 2023-09-09 PROCEDURE — 36415 COLL VENOUS BLD VENIPUNCTURE: CPT | Performed by: PEDIATRICS

## 2023-09-09 PROCEDURE — 97530 THERAPEUTIC ACTIVITIES: CPT | Mod: GP

## 2023-09-09 PROCEDURE — 250N000013 HC RX MED GY IP 250 OP 250 PS 637: Performed by: PEDIATRICS

## 2023-09-09 PROCEDURE — 83735 ASSAY OF MAGNESIUM: CPT | Performed by: PEDIATRICS

## 2023-09-09 PROCEDURE — 80069 RENAL FUNCTION PANEL: CPT | Performed by: PEDIATRICS

## 2023-09-09 PROCEDURE — 85652 RBC SED RATE AUTOMATED: CPT

## 2023-09-09 PROCEDURE — 250N000013 HC RX MED GY IP 250 OP 250 PS 637: Performed by: INTERNAL MEDICINE

## 2023-09-09 PROCEDURE — G0378 HOSPITAL OBSERVATION PER HR: HCPCS

## 2023-09-09 PROCEDURE — 99233 SBSQ HOSP IP/OBS HIGH 50: CPT | Performed by: PEDIATRICS

## 2023-09-09 PROCEDURE — 82962 GLUCOSE BLOOD TEST: CPT

## 2023-09-09 PROCEDURE — 250N000011 HC RX IP 250 OP 636: Performed by: PHYSICIAN ASSISTANT

## 2023-09-09 PROCEDURE — 86140 C-REACTIVE PROTEIN: CPT

## 2023-09-09 PROCEDURE — 96372 THER/PROPH/DIAG INJ SC/IM: CPT | Performed by: PHYSICIAN ASSISTANT

## 2023-09-09 PROCEDURE — 250N000013 HC RX MED GY IP 250 OP 250 PS 637: Performed by: PHYSICIAN ASSISTANT

## 2023-09-09 PROCEDURE — 85027 COMPLETE CBC AUTOMATED: CPT | Performed by: PEDIATRICS

## 2023-09-09 RX ORDER — SENNOSIDES 8.6 MG
8.6 TABLET ORAL DAILY
Status: DISCONTINUED | OUTPATIENT
Start: 2023-09-09 | End: 2023-09-11 | Stop reason: HOSPADM

## 2023-09-09 RX ORDER — POLYETHYLENE GLYCOL 3350 17 G/17G
17 POWDER, FOR SOLUTION ORAL EVERY 4 HOURS
Status: DISCONTINUED | OUTPATIENT
Start: 2023-09-09 | End: 2023-09-11

## 2023-09-09 RX ADMIN — INSULIN ASPART 1 UNITS: 100 INJECTION, SOLUTION INTRAVENOUS; SUBCUTANEOUS at 21:40

## 2023-09-09 RX ADMIN — POLYETHYLENE GLYCOL 3350 17 G: 17 POWDER, FOR SOLUTION ORAL at 09:54

## 2023-09-09 RX ADMIN — POLYETHYLENE GLYCOL 3350 17 G: 17 POWDER, FOR SOLUTION ORAL at 19:49

## 2023-09-09 RX ADMIN — MICONAZOLE NITRATE: 20 POWDER TOPICAL at 19:50

## 2023-09-09 RX ADMIN — SENNOSIDES 8.6 MG: 8.6 TABLET, FILM COATED ORAL at 09:47

## 2023-09-09 RX ADMIN — LOSARTAN POTASSIUM 25 MG: 25 TABLET, FILM COATED ORAL at 09:58

## 2023-09-09 RX ADMIN — CEPHALEXIN 500 MG: 500 CAPSULE ORAL at 09:45

## 2023-09-09 RX ADMIN — PREGABALIN 100 MG: 100 CAPSULE ORAL at 10:35

## 2023-09-09 RX ADMIN — PREGABALIN 100 MG: 100 CAPSULE ORAL at 19:49

## 2023-09-09 RX ADMIN — HEPARIN SODIUM 5000 UNITS: 5000 INJECTION, SOLUTION INTRAVENOUS; SUBCUTANEOUS at 14:56

## 2023-09-09 RX ADMIN — TOLTERODINE 4 MG: 4 CAPSULE, EXTENDED RELEASE ORAL at 09:45

## 2023-09-09 RX ADMIN — CEPHALEXIN 500 MG: 500 CAPSULE ORAL at 19:49

## 2023-09-09 RX ADMIN — Medication 12.5 MG: at 09:45

## 2023-09-09 RX ADMIN — PSYLLIUM HUSK 1 PACKET: 3.4 POWDER ORAL at 10:35

## 2023-09-09 RX ADMIN — EMPAGLIFLOZIN 25 MG: 25 TABLET, FILM COATED ORAL at 14:56

## 2023-09-09 RX ADMIN — MICONAZOLE NITRATE: 20 POWDER TOPICAL at 09:54

## 2023-09-09 RX ADMIN — POLYETHYLENE GLYCOL 3350 17 G: 17 POWDER, FOR SOLUTION ORAL at 14:58

## 2023-09-09 RX ADMIN — INSULIN GLARGINE 26 UNITS: 100 INJECTION, SOLUTION SUBCUTANEOUS at 10:00

## 2023-09-09 RX ADMIN — PSYLLIUM HUSK 1 PACKET: 3.4 POWDER ORAL at 14:58

## 2023-09-09 RX ADMIN — BUMETANIDE 3 MG: 2 TABLET ORAL at 09:47

## 2023-09-09 RX ADMIN — VENLAFAXINE HYDROCHLORIDE 75 MG: 75 CAPSULE, EXTENDED RELEASE ORAL at 09:46

## 2023-09-09 RX ADMIN — POLYETHYLENE GLYCOL 3350 17 G: 17 POWDER, FOR SOLUTION ORAL at 09:57

## 2023-09-09 RX ADMIN — HEPARIN SODIUM 5000 UNITS: 5000 INJECTION, SOLUTION INTRAVENOUS; SUBCUTANEOUS at 21:40

## 2023-09-09 RX ADMIN — HEPARIN SODIUM 5000 UNITS: 5000 INJECTION, SOLUTION INTRAVENOUS; SUBCUTANEOUS at 07:26

## 2023-09-09 RX ADMIN — METOPROLOL SUCCINATE 25 MG: 25 TABLET, EXTENDED RELEASE ORAL at 21:40

## 2023-09-09 RX ADMIN — ANASTROZOLE 1 MG: 1 TABLET, COATED ORAL at 09:56

## 2023-09-09 RX ADMIN — LEVOTHYROXINE SODIUM 150 MCG: 75 TABLET ORAL at 09:45

## 2023-09-09 RX ADMIN — ASPIRIN 81 MG: 81 TABLET, COATED ORAL at 09:45

## 2023-09-09 RX ADMIN — COLCHICINE 0.6 MG: 0.6 TABLET, FILM COATED ORAL at 09:55

## 2023-09-09 ASSESSMENT — ACTIVITIES OF DAILY LIVING (ADL)
ADLS_ACUITY_SCORE: 67
ADLS_ACUITY_SCORE: 65
ADLS_ACUITY_SCORE: 67
ADLS_ACUITY_SCORE: 65
ADLS_ACUITY_SCORE: 63
ADLS_ACUITY_SCORE: 63
ADLS_ACUITY_SCORE: 65
ADLS_ACUITY_SCORE: 65

## 2023-09-09 NOTE — PROGRESS NOTES
[id number]    AO to self disoriented to place, time and situation. She is  AS2 with walker and gait belt. Voids spontaneous. Pt denies Nausea, chest pain , tingles's and numbness.   L PIV SL.   Last BM 9/8 per Pt report.    Mepilex applied on buttock during shift.   Continue with plan pf care  /57 (BP Location: Right arm)   Pulse 82   Temp 98.8  F (37.1  C) (Oral)   Resp 18   Wt 133.8 kg (294 lb 15.6 oz)   SpO2 96%   BMI 46.20 kg/m

## 2023-09-09 NOTE — PROGRESS NOTES
Orthopedic Surgery Progress Note: 09/09/2023    Subjective:   No acute events overnight. Pain well-controlled. Tolerating a diet. Continues to be confused and endorses pain with any movement of the right wrist.     Objective:   BP (!) 166/75 (BP Location: Right arm)   Pulse 69   Temp 99  F (37.2  C) (Oral)   Resp 18   Wt 133.8 kg (294 lb 15.6 oz)   SpO2 92%   BMI 46.20 kg/m    No intake/output data recorded.  General: NAD. Resting comfortably in bed. Exam limited as patient was not very alert and would not answer questions or follow commands.   Respiratory: Nonlabored breathing  Musculoskeletal:    Focused Exam of Right Upper Extremity  Mildly decreased dorsal swelling, erythema   TTP along dorsal and radial wrist, non-tender to palpation along ulnar wrist  ROM passive Flexion/Extension: 5/5 degrees with pain. Does not follow commands and unable to assess active ROM.   Fires FPL, EPL, IO.   Unable to assess sensation  2+ Radial pulse, hand WWP    Laboratory Data:  Lab Results   Component Value Date    WBC 7.9 09/08/2023    HGB 12.0 09/08/2023     09/08/2023    INR 1.01 05/12/2020     Cultures:  09/07 right wrist arthrocentesis: Gram stain negative. Cx NGTD     Assessment & Plan:   Mariel Ribeiro is a 78 year old female with 78yof with PMH of HFpEF (EF 45-50%) s/p CardioMem implant (goal PA diastolic 14-18), CAD s/p PCI x 2 to LAD and CABG 2018 (LIMA-> LAD, SVG->OM2 and RPDA), and gout presented with right wrist pain and swelling. She had been treated at urgent care on 09/05 for cellulitis after 5 days of right wrist swelling. We were consulted to r/o septic arthritis. A right wrist aspiration was done on 09/07. Cultures pending. Gram stain was negative. Not enough fluid for stat cell count or crystal analysis.     Plan for Today:  Follow cultures until finalized   Trend ESR/CRP   Follow clinically    Medicine Primary  - Plan for OR: None at this time  - Anticoagulation/DVT prophylaxis: Defer to  primary  - Antibiotics: Agree with plan for Keflex during admission  - Imaging: Reviewed R Wrist Xrs and US  - Activity: No restrictions  - Weight bearing: WBAT RUE   - Pain control: Defer to primary  - Diet: Regular Diet  - Follow-up: TBD   - Disposition: TBD     Staff: Gaurang Miller MD    ------------------------------------------------------------------------------------------    Kim Orozco MD, PGY4  Orthopedic Surgery   Pager: [phone]       FOLLOWUP:    Future Appointments   Date Time Provider Department Center   9/8/2023  1:45 PM UR PT REHAB TECH URPT [place]   11/28/2023  4:15 PM [facility]    12/1/2023  2:15 PM Austin Miguel, [facility]   6/17/2024  2:30 PM Opal Weber MD [facility]

## 2023-09-09 NOTE — PROGRESS NOTES
[facility]    Medicine Progress Note - Hospitalist Service, GOLD TEAM 22    Date of Admission:  9/6/2023    Assessment & Plan      Mariel Ribeiro is a 78 year old female patient with a past medical history significant for HFpEF (EF 45-50%) s/p CardioMem implant (goal PA diastolic 14-18), CAD s/p PCI x 2 to LAD and CABG 2018 (LIMA-> LAD, SVG->OM2 and RPDA), BELEN on CPAP, HTN, HLD, thyroid cancer s/p thyroidectomy with resultant hypothyroidism, BELEN, T2DM c/b CKD3, lymphedema, depression, anxiety, insomnia, GERD, gout, chronic pain, COPD, morbid obesity, Left Breast DCIS (biopsy 11/2019 w/ ER positive, LA positive, grade II, papillary and solid type DCIS, started anastrozole 6/2020) who presented to [facility] ED after EMS was called for lift assistance.    Dispo: I think she might be able to go home with home therapy services, will defer to therapy.    Failure to Thrive with Generalized Weakness  Bilateral Lower Extremity Lymphedema  Encephalopathy of uncertain etiology  Confused/encephalopathic, waxing/waning and present prior to admission per admitting providers; no infectious symptoms - urinalysis was ordered on admission and never collected, patient denies dysuria/urgency/frequency/incontinence  Confusion is improving day by day, better later in day; spoke with patient and roommate yesterday at same time and roommate felt she was progressing enough to potentially come home, also felt lymphedema had improved which was another barrier to ambulation formerly   - PT, OT, SW   - Delirium precautions   - Lymphedema therapy consult  - I wouldn't resume her repaglinide at discharge as this causes fluid retention; roommate reports lymphedema looks better since that drug has been on hold (since admission)    R Hand Cellulitis vs Gout  - probably gout; ortho aspirated wrist with minimal output and some chalky white substance, wbcs without bacteria and cultures ngtd; will  finish 5-7 d course of cephalexin and continue daily colchicine (though kidney function borderline and may need to discontinue now that wrist pain improving)  - should probably be on uric acid lowering treatment given uric acid of 10; rash with allopurinol historically, can re-prescribe febuxostat at discharge    Chronic Hypercalcemia - normal today  Noted Ca persistently elevated for the past couple years, can keep monitoring; not contributing to current presentation    Longstanding HFpEF (recent echo EF 45-50%)  BELEN on CPAP  HTN and HLD  Stage C. NYHA Class IIIb- although confounded by comorbidities. Remote PA Pressure Monitoring (CardioMems) Implanted (Date 7/2019).   - BP: Continue losartan 25mg daily and metoprolol xl 12.5mg QAM and 25mg QPM   - Fluid status: Euvolemic, continue PTA Bumex 3mg daily   - SGLT2i: Hold Jardiance 10mg daily for now   - NSAID use: Contraindicated   - Sleep apnea evaluation: has BELEN but doesn't use CPAP, VBG showed normal CO2   - Continue PTA statin   - Monitor I&O and daily weights     CAD  CAD s/p PCI x 2 to LAD and CABG 2018 (LIMA-> LAD, SVG->OM2 and RPDA).  - Continue ASA, statin, beta-blocker as above     CKD stage IIIb  Baseline creatinine variable but appears to be 1.4-1.7. Cr at her baseline today.   - keep monitoring     T2DM  Last A1C 7.6 8/21/23. PTA regimen: Lantus 26 units daily, Prandin/ repaglinide: 1mg with breakfast & 1mg with dinner if includes a Blizzard or BG before dinner is >140, Farxiga 10mg each morning.   - dc Prandin and restart Farxiga   - Continue PTA lantus 26 units daily   - Monitor BG before meals and at bedtime   - Medium sliding scale insulin available if needed    Left Breast DCIS  Biopsy 11/2019 w/ ER positive, AR positive, grade II, papillary and solid type DCIS, started anastrozole 6/2020, she declined surgical intervention.   - Continue PTA Anastrozole     Chronic Pain   - Continue PTA Lyrica    Hypothyroidism   - Continue PTA  Synthroid    Depression   - Continue PTA Effexor    Urinary Urgency   - Continue PTA Tolterodine       Diet: Regular Diet Adult    DVT Prophylaxis: Heparin SQ  Gutierrez Catheter: Not present  Lines: None     Cardiac Monitoring: None  Code Status: Full Code      Clinically Significant Risk Factors Present on Admission           # Hypercalcemia: corrected calcium is >10.1, will monitor as appropriate    # Hypoalbuminemia: Lowest albumin = 3.1 g/dL at 9/9/2023  8:12 AM, will monitor as appropriate     # Drug Induced Platelet Defect: home medication list includes an antiplatelet medication     # Hypertension: Noted on problem list     # DMII: A1C = 7.6 % (Ref range: <5.7 %) within past 6 months          # History of CABG: noted on surgical history       Disposition Plan     Expected Discharge Date: 09/10/2023      Destination: nursing home;home with help/services;home            Meet Vigil DO  Hospitalist Service, GOLD TEAM [address] [place] [address]  Securely message with [employer] (more info)  Text page via [employer] Paging/Directory   See signed in provider for up to date coverage information  ______________________________________________________________________    Interval History   She's still only oriented to person this morning but more interactive. Still having some wrist pain though she's able to move it around a bit more. No bm since admission.    Physical Exam   Vital Signs: Temp: 98.6  F (37  C) Temp src: Oral BP: (!) 162/61 Pulse: 63   Resp: 18 SpO2: 94 % O2 Device: None (Room air)    Weight: 294 lbs 15.61 oz    Lying in bed in no distress  Right wrist is less erythematous and less swollen, greater passive ROM but still painful, no longer warm  Breathing rate and work of breathing are normal    Medical Decision Making             Data     Lab on 08/21/2023   Component Date Value Ref Range Status    Hemoglobin A1C 08/21/2023 7.6 (H)  <5.7 % Final    Normal <5.7%   Prediabetes  5.7-6.4%    Diabetes 6.5% or higher     Note: Adopted from ADA consensus guidelines.    WBC Count 08/21/2023 7.5  4.0 - 11.0 10e3/uL Final    RBC Count 08/21/2023 4.48  3.80 - 5.20 10e6/uL Final    Hemoglobin 08/21/2023 12.7  11.7 - 15.7 g/dL Final    Hematocrit 08/21/2023 40.1  35.0 - 47.0 % Final    MCV 08/21/2023 90  78 - 100 fL Final    MCH 08/21/2023 28.3  26.5 - 33.0 pg Final    MCHC 08/21/2023 31.7  31.5 - 36.5 g/dL Final    RDW 08/21/2023 13.6  10.0 - 15.0 % Final    Platelet Count 08/21/2023 154  150 - 450 10e3/uL Final    Creatinine Urine mg/dL 08/21/2023 39.9  mg/dL Final    The reference ranges have not been established in urine creatinine. The results should be integrated into the clinical context for interpretation.    Albumin Urine mg/L 08/21/2023 66.4  mg/L Final    The reference ranges have not been established in urine albumin. The results should be integrated into the clinical context for interpretation.    Albumin Urine mg/g Cr 08/21/2023 166.42 (H)  0.00 - 25.00 mg/g Cr Final    Microalbuminuria is defined as an albumin:creatinine ratio of 17 to 299 for males and 25 to 299 for females. A ratio of albumin:creatinine of 300 or higher is indicative of overt proteinuria.  Due to biologic variability, positive results should be confirmed by a second, first-morning random or 24-hour timed urine specimen. If there is discrepancy, a third specimen is recommended. When 2 out of 3 results are in the microalbuminuria range, this is evidence for incipient nephropathy and warrants increased efforts at glucose control, blood pressure control, and institution of therapy with an angiotensin-converting-enzyme (ACE) inhibitor (if the patient can tolerate it).      N Terminal Pro BNP Outpatient 08/21/2023 983  0 - 1,800 pg/mL Final    Reference range shown and results flagged as abnormal are for the outpatient, non acute settings. Establishing a baseline value for each individual patient is useful for  follow-up.    Suggested inpatient cut points for confirming diagnosis of CHF in an acute setting are:  >450 pg/mL (age 18 to less than 50)  >900 pg/mL (age 50 to less than 75)  >1800 pg/mL (75 yrs and older)    An inpatient or emergency department NT-proPBNP <300 pg/mL effectively rules out acute CHF, with 99% negative predictive value.        Sodium 08/21/2023 143  136 - 145 mmol/L Final    Potassium 08/21/2023 4.0  3.4 - 5.3 mmol/L Final    Chloride 08/21/2023 103  98 - 107 mmol/L Final    Carbon Dioxide (CO2) 08/21/2023 28  22 - 29 mmol/L Final    Anion Gap 08/21/2023 12  7 - 15 mmol/L Final    Urea Nitrogen 08/21/2023 37.4 (H)  8.0 - 23.0 mg/dL Final    Creatinine 08/21/2023 1.63 (H)  0.51 - 0.95 mg/dL Final    Calcium 08/21/2023 10.3 (H)  8.8 - 10.2 mg/dL Final    Glucose 08/21/2023 164 (H)  70 - 99 mg/dL Final    GFR Estimate 08/21/2023 32 (L)  >60 mL/min/1.73m2 Final    Pregabalin 08/21/2023 Present (A)  Absent Final    Sources of pregabalin are prescription medications.    Creatinine Urine for Drug Screen 08/21/2023 41  mg/dL Final    The reference range has not been established for creatinine in random urines. The results should be integrated into the clinical context for interpretation.

## 2023-09-09 NOTE — PROGRESS NOTES
VS:  BP (!) 162/61 (BP Location: Right arm)   Pulse 63   Temp 98.6  F (37  C) (Oral)   Resp 18   Wt 133.8 kg (294 lb 15.6 oz)   SpO2 94%   BMI 46.20 kg/m      O2:  Room Air    Output:  Independent   Last BM:  09/09/23 AM   Activity:  Up To Chair   Up for meals?  Yes Chair    Skin:  Edema Body /Bilateral lower Extremities; With Bruise on Right Forearm, Wrist, Hand    Pain: Denied   CMS:  Confused / A&OSelf   Dressing:  Mepilex   Diet:  Regular    LDA:  L PIV   Equipment:  IV Pole   Plan:  POC   Additional Info:

## 2023-09-09 NOTE — PROGRESS NOTES
No acute concerns or changes during NOC shift. Patient denies pain, intermittent cough noted. Patient attempted to exit bed, needed to be reoriented to situation and place. Slept well for the rest of the night, continue with POC.    Patient's most recent vitals:  BP (!) 166/75 (BP Location: Right arm)   Pulse 69   Temp 99  F (37.2  C) (Oral)   Resp 18   Wt 133.8 kg (294 lb 15.6 oz)   SpO2 92%   BMI 46.20 kg/m

## 2023-09-10 LAB
ALBUMIN SERPL BCG-MCNC: 3.5 G/DL (ref 3.5–5.2)
ANION GAP SERPL CALCULATED.3IONS-SCNC: 13 MMOL/L (ref 7–15)
BUN SERPL-MCNC: 55.2 MG/DL (ref 8–23)
CALCIUM SERPL-MCNC: 10 MG/DL (ref 8.8–10.2)
CHLORIDE SERPL-SCNC: 99 MMOL/L (ref 98–107)
CREAT SERPL-MCNC: 1.65 MG/DL (ref 0.51–0.95)
CRP SERPL-MCNC: 46.15 MG/L
DEPRECATED HCO3 PLAS-SCNC: 27 MMOL/L (ref 22–29)
EGFRCR SERPLBLD CKD-EPI 2021: 32 ML/MIN/1.73M2
ERYTHROCYTE [DISTWIDTH] IN BLOOD BY AUTOMATED COUNT: 12.7 % (ref 10–15)
ERYTHROCYTE [SEDIMENTATION RATE] IN BLOOD BY WESTERGREN METHOD: 44 MM/HR (ref 0–30)
GLUCOSE BLDC GLUCOMTR-MCNC: 186 MG/DL (ref 70–99)
GLUCOSE BLDC GLUCOMTR-MCNC: 189 MG/DL (ref 70–99)
GLUCOSE BLDC GLUCOMTR-MCNC: 202 MG/DL (ref 70–99)
GLUCOSE BLDC GLUCOMTR-MCNC: 214 MG/DL (ref 70–99)
GLUCOSE BLDC GLUCOMTR-MCNC: 233 MG/DL (ref 70–99)
GLUCOSE SERPL-MCNC: 167 MG/DL (ref 70–99)
HCT VFR BLD AUTO: 39.8 % (ref 35–47)
HGB BLD-MCNC: 12.5 G/DL (ref 11.7–15.7)
MAGNESIUM SERPL-MCNC: 1.9 MG/DL (ref 1.7–2.3)
MCH RBC QN AUTO: 28.2 PG (ref 26.5–33)
MCHC RBC AUTO-ENTMCNC: 31.4 G/DL (ref 31.5–36.5)
MCV RBC AUTO: 90 FL (ref 78–100)
PHOSPHATE SERPL-MCNC: 4.6 MG/DL (ref 2.5–4.5)
PLATELET # BLD AUTO: 172 10E3/UL (ref 150–450)
POTASSIUM SERPL-SCNC: 3.9 MMOL/L (ref 3.4–5.3)
RBC # BLD AUTO: 4.44 10E6/UL (ref 3.8–5.2)
SODIUM SERPL-SCNC: 139 MMOL/L (ref 136–145)
WBC # BLD AUTO: 6.2 10E3/UL (ref 4–11)

## 2023-09-10 PROCEDURE — 85027 COMPLETE CBC AUTOMATED: CPT | Performed by: PEDIATRICS

## 2023-09-10 PROCEDURE — 80069 RENAL FUNCTION PANEL: CPT | Performed by: PEDIATRICS

## 2023-09-10 PROCEDURE — 82962 GLUCOSE BLOOD TEST: CPT

## 2023-09-10 PROCEDURE — 96372 THER/PROPH/DIAG INJ SC/IM: CPT | Performed by: PHYSICIAN ASSISTANT

## 2023-09-10 PROCEDURE — 85652 RBC SED RATE AUTOMATED: CPT

## 2023-09-10 PROCEDURE — 250N000011 HC RX IP 250 OP 636: Performed by: PHYSICIAN ASSISTANT

## 2023-09-10 PROCEDURE — 86140 C-REACTIVE PROTEIN: CPT

## 2023-09-10 PROCEDURE — 99232 SBSQ HOSP IP/OBS MODERATE 35: CPT | Performed by: PEDIATRICS

## 2023-09-10 PROCEDURE — G0378 HOSPITAL OBSERVATION PER HR: HCPCS

## 2023-09-10 PROCEDURE — 250N000013 HC RX MED GY IP 250 OP 250 PS 637: Performed by: PEDIATRICS

## 2023-09-10 PROCEDURE — 36415 COLL VENOUS BLD VENIPUNCTURE: CPT

## 2023-09-10 PROCEDURE — 250N000013 HC RX MED GY IP 250 OP 250 PS 637: Performed by: PHYSICIAN ASSISTANT

## 2023-09-10 PROCEDURE — 83735 ASSAY OF MAGNESIUM: CPT | Performed by: PEDIATRICS

## 2023-09-10 PROCEDURE — 250N000013 HC RX MED GY IP 250 OP 250 PS 637: Performed by: INTERNAL MEDICINE

## 2023-09-10 RX ADMIN — PSYLLIUM HUSK 1 PACKET: 3.4 POWDER ORAL at 13:01

## 2023-09-10 RX ADMIN — LOSARTAN POTASSIUM 25 MG: 25 TABLET, FILM COATED ORAL at 08:11

## 2023-09-10 RX ADMIN — COLCHICINE 0.6 MG: 0.6 TABLET, FILM COATED ORAL at 08:15

## 2023-09-10 RX ADMIN — POLYETHYLENE GLYCOL 3350 17 G: 17 POWDER, FOR SOLUTION ORAL at 16:26

## 2023-09-10 RX ADMIN — PSYLLIUM HUSK 1 PACKET: 3.4 POWDER ORAL at 08:18

## 2023-09-10 RX ADMIN — POLYETHYLENE GLYCOL 3350 17 G: 17 POWDER, FOR SOLUTION ORAL at 19:46

## 2023-09-10 RX ADMIN — CEPHALEXIN 500 MG: 500 CAPSULE ORAL at 08:12

## 2023-09-10 RX ADMIN — HEPARIN SODIUM 5000 UNITS: 5000 INJECTION, SOLUTION INTRAVENOUS; SUBCUTANEOUS at 05:31

## 2023-09-10 RX ADMIN — LEVOTHYROXINE SODIUM 150 MCG: 75 TABLET ORAL at 08:13

## 2023-09-10 RX ADMIN — MICONAZOLE NITRATE: 20 POWDER TOPICAL at 19:51

## 2023-09-10 RX ADMIN — CEPHALEXIN 500 MG: 500 CAPSULE ORAL at 19:46

## 2023-09-10 RX ADMIN — ASPIRIN 81 MG: 81 TABLET, COATED ORAL at 08:09

## 2023-09-10 RX ADMIN — Medication 12.5 MG: at 08:11

## 2023-09-10 RX ADMIN — PREGABALIN 100 MG: 100 CAPSULE ORAL at 19:47

## 2023-09-10 RX ADMIN — PREGABALIN 100 MG: 100 CAPSULE ORAL at 08:11

## 2023-09-10 RX ADMIN — EMPAGLIFLOZIN 25 MG: 25 TABLET, FILM COATED ORAL at 08:13

## 2023-09-10 RX ADMIN — POLYETHYLENE GLYCOL 3350 17 G: 17 POWDER, FOR SOLUTION ORAL at 13:00

## 2023-09-10 RX ADMIN — METOPROLOL SUCCINATE 25 MG: 25 TABLET, EXTENDED RELEASE ORAL at 21:55

## 2023-09-10 RX ADMIN — MICONAZOLE NITRATE: 20 POWDER TOPICAL at 08:09

## 2023-09-10 RX ADMIN — BUMETANIDE 3 MG: 2 TABLET ORAL at 08:09

## 2023-09-10 RX ADMIN — HEPARIN SODIUM 5000 UNITS: 5000 INJECTION, SOLUTION INTRAVENOUS; SUBCUTANEOUS at 14:36

## 2023-09-10 RX ADMIN — INSULIN GLARGINE 26 UNITS: 100 INJECTION, SOLUTION SUBCUTANEOUS at 08:16

## 2023-09-10 RX ADMIN — SENNOSIDES 8.6 MG: 8.6 TABLET, FILM COATED ORAL at 08:12

## 2023-09-10 RX ADMIN — HEPARIN SODIUM 5000 UNITS: 5000 INJECTION, SOLUTION INTRAVENOUS; SUBCUTANEOUS at 21:55

## 2023-09-10 RX ADMIN — ANASTROZOLE 1 MG: 1 TABLET, COATED ORAL at 08:15

## 2023-09-10 RX ADMIN — POLYETHYLENE GLYCOL 3350 17 G: 17 POWDER, FOR SOLUTION ORAL at 08:08

## 2023-09-10 RX ADMIN — VENLAFAXINE HYDROCHLORIDE 75 MG: 75 CAPSULE, EXTENDED RELEASE ORAL at 08:12

## 2023-09-10 RX ADMIN — POLYETHYLENE GLYCOL 3350 17 G: 17 POWDER, FOR SOLUTION ORAL at 05:31

## 2023-09-10 RX ADMIN — TOLTERODINE 4 MG: 4 CAPSULE, EXTENDED RELEASE ORAL at 08:10

## 2023-09-10 ASSESSMENT — ACTIVITIES OF DAILY LIVING (ADL)
ADLS_ACUITY_SCORE: 63

## 2023-09-10 NOTE — PROGRESS NOTES
Care Management Follow Up    Length of Stay (days): 1    Expected Discharge Date: 09/10/2023     Concerns to be Addressed: Discharge Planning       Patient plan of care discussed at interdisciplinary rounds: Yes    Anticipated Discharge Disposition: Transitional Care     Anticipated Discharge Services: Post Acute Therapies     Anticipated Discharge DME: None     Patient/family educated on Medicare website which has current facility and service quality ratings: Yes    Education Provided on the Discharge Plan: Yes    Patient/Family in Agreement with the Plan: Yes    Referrals Placed by CM/SW: TCU     Private pay costs discussed: Not applicable    Additional Information:    Writer met with patient and roommate at bedside. Writer discussed with them the recommendations for Transitional Care and provided medicare care compare document. Facilities below were provided as preferences. [facility] is a facility patient has been at previously. Patient is bariatric; patient and roommate are aware that this is a possible barrier to placement. SW to continue to follow for support and discharge planning needs that arise.    Pending Referrals    [facility]   [address] [place] [address]  [phone]  9/10: Referral Sent    [facility]  [address] [facility] [address]  [phone]  9/10: Referral Sent    [facility]  [address] [place] [address]  [phone]  9/10: Referral Sent    [facility]  [address] [place] [address]  [phone]  9/10: Referral Sent    [facility]  [address] [place] [address]  [phone]  9/10: Referral Sent    [facility]  [address] [facility] [address] [facility] [address]  [phone]  9/10: Referral Sent    SAMANTHA Dyson MSW  Adult Acute Care Float   Pager [phone]

## 2023-09-10 NOTE — PROGRESS NOTES
[facility]    Medicine Progress Note - Hospitalist Service, GOLD TEAM 22    Date of Admission:  9/6/2023    Assessment & Plan      Mariel Ribeiro is a 78 year old female patient with a past medical history significant for HFpEF (EF 45-50%) s/p CardioMem implant (goal PA diastolic 14-18), CAD s/p PCI x 2 to LAD and CABG 2018 (LIMA-> LAD, SVG->OM2 and RPDA), BELEN on CPAP, HTN, HLD, thyroid cancer s/p thyroidectomy with resultant hypothyroidism, BELEN, T2DM c/b CKD3, lymphedema, depression, anxiety, insomnia, GERD, gout, chronic pain, COPD, morbid obesity, Left Breast DCIS (biopsy 11/2019 w/ ER positive, UT positive, grade II, papillary and solid type DCIS, started anastrozole 6/2020) who presented to [facility] ED after EMS was called for lift assistance.    Dispo: TCU    Failure to Thrive with Generalized Weakness  Bilateral Lower Extremity Lymphedema  Encephalopathy of uncertain etiology  Waxes and wanes; encephalopathy is comparable to prior admissions per roommate and has improved since admission   - PT, OT, SW   - Delirium precautions   - Lymphedema therapy consult  - I wouldn't resume her repaglinide at discharge as this causes fluid retention; roommate reports lymphedema looks better since that drug has been on hold (since admission) and this was one of the reasons she was having trouble ambulating    R Hand Cellulitis vs Gout  - probably gout; ortho aspirated wrist with minimal output and some chalky white substance, wbcs without bacteria and cultures ngtd; will finish 5-7 d course of cephalexin  - wrist is better every day, can probably stop colchicine tomorrow  - should probably be on uric acid lowering treatment given uric acid of 10; rash with allopurinol historically, can re-prescribe febuxostat at discharge and recheck uric acid outpatient    Chronic Hypercalcemia - normal today  Noted Ca persistently elevated for the past couple years, can keep monitoring;  not contributing to current presentation    Longstanding HFpEF (recent echo EF 45-50%)  BELEN on CPAP  HTN and HLD  Stage C. NYHA Class IIIb- although confounded by comorbidities. Remote PA Pressure Monitoring (CardioMems) Implanted (Date 7/2019).   - BP: Continue losartan 25mg daily and metoprolol xl 12.5mg QAM and 25mg QPM   - Fluid status: Euvolemic, continue PTA Bumex 3mg daily   - SGLT2i: Hold Jardiance 10mg daily for now   - NSAID use: Contraindicated   - Sleep apnea evaluation: has BELEN but doesn't use CPAP, VBG showed normal CO2   - Continue PTA statin   - Monitor I&O and daily weights     CAD  CAD s/p PCI x 2 to LAD and CABG 2018 (LIMA-> LAD, SVG->OM2 and RPDA).  - Continue ASA, statin, beta-blocker as above     CKD stage IIIb  Baseline creatinine variable but appears to be 1.4-1.7. Cr at her baseline today.   - keep monitoring     T2DM  Last A1C 7.6 8/21/23. PTA regimen: Lantus 26 units daily, Prandin/ repaglinide: 1mg with breakfast & 1mg with dinner if includes a Blizzard or BG before dinner is >140, Farxiga 10mg each morning.   - dc Prandin and restart Farxiga   - Continue PTA lantus 26 units daily   - Monitor BG before meals and at bedtime   - Medium sliding scale insulin available if needed    Left Breast DCIS  Biopsy 11/2019 w/ ER positive, NC positive, grade II, papillary and solid type DCIS, started anastrozole 6/2020, she declined surgical intervention.   - Continue PTA Anastrozole     Chronic Pain   - Continue PTA Lyrica    Hypothyroidism   - Continue PTA Synthroid    Depression   - Continue PTA Effexor    Urinary Urgency   - Continue PTA Tolterodine       Diet: Regular Diet Adult    DVT Prophylaxis: Heparin SQ  Gutierrez Catheter: Not present  Lines: None     Cardiac Monitoring: None  Code Status: Full Code      Clinically Significant Risk Factors Present on Admission           # Hypercalcemia: corrected calcium is >10.1, will monitor as appropriate    # Hypoalbuminemia: Lowest albumin = 3.1 g/dL at  9/9/2023  8:12 AM, will monitor as appropriate     # Drug Induced Platelet Defect: home medication list includes an antiplatelet medication     # Hypertension: Noted on problem list     # DMII: A1C = 7.6 % (Ref range: <5.7 %) within past 6 months          # History of CABG: noted on surgical history       Disposition Plan     Expected Discharge Date: 09/10/2023      Destination: nursing home;home with help/services;home            Meet Vigil,   Hospitalist Service, GOLD TEAM 22  [facility]  Securely message with [employer] (more info)  Text page via [facility] Paging/Directory   See signed in provider for up to date coverage information  ______________________________________________________________________    Interval History   Oriented to person only, thinks it's the 20th century and isn't quite sure where she is. Remembers roommate's name. Remembers her own name. Wrist pain improved. Doesn't recall having a bm. Endorses pain with urination today.    Physical Exam   Vital Signs: Temp: 97.9  F (36.6  C) Temp src: Oral BP: (!) 144/53 Pulse: 65   Resp: 18 SpO2: 91 % O2 Device: None (Room air)    Weight: 294 lbs 15.61 oz    Lying in bed in no distress  Right wrist is less erythematous and less swollen, greater passive and active ROM today, no warmth or erythema just some bruising  Breathing rate and work of breathing are normal    Spent 45 minutes on care of this patient today    Data     Lab on 08/21/2023   Component Date Value Ref Range Status    Hemoglobin A1C 08/21/2023 7.6 (H)  <5.7 % Final    Normal <5.7%   Prediabetes 5.7-6.4%    Diabetes 6.5% or higher     Note: Adopted from ADA consensus guidelines.    WBC Count 08/21/2023 7.5  4.0 - 11.0 10e3/uL Final    RBC Count 08/21/2023 4.48  3.80 - 5.20 10e6/uL Final    Hemoglobin 08/21/2023 12.7  11.7 - 15.7 g/dL Final    Hematocrit 08/21/2023 40.1  35.0 - 47.0 % Final    MCV 08/21/2023 90  78 - 100 fL Final    MCH  08/21/2023 28.3  26.5 - 33.0 pg Final    MCHC 08/21/2023 31.7  31.5 - 36.5 g/dL Final    RDW 08/21/2023 13.6  10.0 - 15.0 % Final    Platelet Count 08/21/2023 154  150 - 450 10e3/uL Final    Creatinine Urine mg/dL 08/21/2023 39.9  mg/dL Final    The reference ranges have not been established in urine creatinine. The results should be integrated into the clinical context for interpretation.    Albumin Urine mg/L 08/21/2023 66.4  mg/L Final    The reference ranges have not been established in urine albumin. The results should be integrated into the clinical context for interpretation.    Albumin Urine mg/g Cr 08/21/2023 166.42 (H)  0.00 - 25.00 mg/g Cr Final    Microalbuminuria is defined as an albumin:creatinine ratio of 17 to 299 for males and 25 to 299 for females. A ratio of albumin:creatinine of 300 or higher is indicative of overt proteinuria.  Due to biologic variability, positive results should be confirmed by a second, first-morning random or 24-hour timed urine specimen. If there is discrepancy, a third specimen is recommended. When 2 out of 3 results are in the microalbuminuria range, this is evidence for incipient nephropathy and warrants increased efforts at glucose control, blood pressure control, and institution of therapy with an angiotensin-converting-enzyme (ACE) inhibitor (if the patient can tolerate it).      N Terminal Pro BNP Outpatient 08/21/2023 983  0 - 1,800 pg/mL Final    Reference range shown and results flagged as abnormal are for the outpatient, non acute settings. Establishing a baseline value for each individual patient is useful for follow-up.    Suggested inpatient cut points for confirming diagnosis of CHF in an acute setting are:  >450 pg/mL (age 18 to less than 50)  >900 pg/mL (age 50 to less than 75)  >1800 pg/mL (75 yrs and older)    An inpatient or emergency department NT-proPBNP <300 pg/mL effectively rules out acute CHF, with 99% negative predictive value.        Sodium  08/21/2023 143  136 - 145 mmol/L Final    Potassium 08/21/2023 4.0  3.4 - 5.3 mmol/L Final    Chloride 08/21/2023 103  98 - 107 mmol/L Final    Carbon Dioxide (CO2) 08/21/2023 28  22 - 29 mmol/L Final    Anion Gap 08/21/2023 12  7 - 15 mmol/L Final    Urea Nitrogen 08/21/2023 37.4 (H)  8.0 - 23.0 mg/dL Final    Creatinine 08/21/2023 1.63 (H)  0.51 - 0.95 mg/dL Final    Calcium 08/21/2023 10.3 (H)  8.8 - 10.2 mg/dL Final    Glucose 08/21/2023 164 (H)  70 - 99 mg/dL Final    GFR Estimate 08/21/2023 32 (L)  >60 mL/min/1.73m2 Final    Pregabalin 08/21/2023 Present (A)  Absent Final    Sources of pregabalin are prescription medications.    Creatinine Urine for Drug Screen 08/21/2023 41  mg/dL Final    The reference range has not been established for creatinine in random urines. The results should be integrated into the clinical context for interpretation.

## 2023-09-10 NOTE — PLAN OF CARE
A/O to self only with off and on confusion. VSS on Room Air Q4. Denied SOB, CP, Cough, N/V, N/T, Dizziness, Headache. Pt denied pain this shift. Regular diet, thin liquids, takes pills whole. Incontinent of B/B, LBM 9/9/23. Pt refused Miralax one out of two doses. Assist of 1 or 2 with walker and gait belt. Skin has bruising in R hip and bilateral hands, redness on groin and coccyx-Mepilex intact. Edema in BLE. L PIV SL, patent with flush. Call light within reach, able to make needs known. Appears to be sleeping during rounds. Continue POC with expected discharge on 9/10/23.     BG of 202 at 0200

## 2023-09-10 NOTE — PROGRESS NOTES
VS:  BP (!) 144/53 (BP Location: Right arm)   Pulse 65   Temp 97.9  F (36.6  C) (Oral)   Resp 18   Wt 133.8 kg (294 lb 15.6 oz)   SpO2 91%   BMI 46.20 kg/m     O2:  Room Air   Output:  1000 cc   Last BM:  Active Bowels Sounds   Activity:  Please get PT up to Toilet   Up for meals?  Get up to Chair for meals   Skin: Bruising to Right and Left Forearms, Right hip bruising. Mepilex to Sacrum / Edema / Obese   Pain:  Denied   CMS:  A&OX 2-3   Dressing:  Protection   Diet:  Regular / BG   LDA:  L PIV   Equipment:  IV Pole / Personal Belongings   Plan:  POC   Additional Info:  UA/UC ordered late afternoon / [employer]

## 2023-09-10 NOTE — PLAN OF CARE
Goal Outcome Evaluation:      Plan of Care Reviewed With: patient, caregiver    Overall Patient Progress: no changeOverall Patient Progress: no change           VS: BP (!) 156/62 (BP Location: Right arm, Patient Position: Supine, Cuff Size: Adult Regular)   Pulse 64   Temp 97.9  F (36.6  C) (Oral)   Resp 18   Wt 133.8 kg (294 lb 15.6 oz)   SpO2 95%   BMI 46.20 kg/m      O2: Stable on RA >90%, denied SOB, lung sounds are clear.   Output: Incontinent of bladder   Last BM: 9/9/23- bowel meds given   Activity: Up with 1-2 assist with walker and gb. Pt did not get out of bed this shift. Slept most of the shift until dinner time   Skin: Visible skin intact, except for bruising to right and left forearms, right hip bruising. Mepilex to sacrum for prevention    Pain: Denied pain   CMS: Alert to self only, denied chest pain, edema to BLE extremities   Dressing: mepilex   Diet: Regular diet/thin liquids- takes meds whole,  good appetite this shift,denied N/V.  this after noon with no coverage given, and 225 at bedtime with 1 unit given.   LDA: Left PIV-SL   Equipment: Iv pole, walker,gb, pt personal belonging    Plan: Discharge maybe tomorrow, will continue with POC   Additional Info: Call light within reach, bed in low position

## 2023-09-11 ENCOUNTER — APPOINTMENT (OUTPATIENT)
Dept: PHYSICAL THERAPY | Facility: CLINIC | Age: 78
End: 2023-09-11
Attending: PHYSICIAN ASSISTANT
Payer: MEDICARE

## 2023-09-11 VITALS
BODY MASS INDEX: 46.2 KG/M2 | OXYGEN SATURATION: 94 % | WEIGHT: 293 LBS | DIASTOLIC BLOOD PRESSURE: 65 MMHG | HEART RATE: 65 BPM | SYSTOLIC BLOOD PRESSURE: 167 MMHG | TEMPERATURE: 98.3 F | RESPIRATION RATE: 18 BRPM

## 2023-09-11 LAB
ALBUMIN SERPL BCG-MCNC: 3.2 G/DL (ref 3.5–5.2)
ANION GAP SERPL CALCULATED.3IONS-SCNC: 14 MMOL/L (ref 7–15)
BUN SERPL-MCNC: 55 MG/DL (ref 8–23)
CALCIUM SERPL-MCNC: 9.6 MG/DL (ref 8.8–10.2)
CHLORIDE SERPL-SCNC: 102 MMOL/L (ref 98–107)
CREAT SERPL-MCNC: 1.6 MG/DL (ref 0.51–0.95)
CRP SERPL-MCNC: 25.93 MG/L
DEPRECATED HCO3 PLAS-SCNC: 23 MMOL/L (ref 22–29)
EGFRCR SERPLBLD CKD-EPI 2021: 33 ML/MIN/1.73M2
ERYTHROCYTE [SEDIMENTATION RATE] IN BLOOD BY WESTERGREN METHOD: 50 MM/HR (ref 0–30)
GLUCOSE BLDC GLUCOMTR-MCNC: 172 MG/DL (ref 70–99)
GLUCOSE BLDC GLUCOMTR-MCNC: 173 MG/DL (ref 70–99)
GLUCOSE BLDC GLUCOMTR-MCNC: 191 MG/DL (ref 70–99)
GLUCOSE BLDC GLUCOMTR-MCNC: 198 MG/DL (ref 70–99)
GLUCOSE SERPL-MCNC: 158 MG/DL (ref 70–99)
PHOSPHATE SERPL-MCNC: 4.3 MG/DL (ref 2.5–4.5)
POTASSIUM SERPL-SCNC: 3.7 MMOL/L (ref 3.4–5.3)
SODIUM SERPL-SCNC: 139 MMOL/L (ref 136–145)

## 2023-09-11 PROCEDURE — 96372 THER/PROPH/DIAG INJ SC/IM: CPT | Performed by: PHYSICIAN ASSISTANT

## 2023-09-11 PROCEDURE — 36415 COLL VENOUS BLD VENIPUNCTURE: CPT

## 2023-09-11 PROCEDURE — 250N000013 HC RX MED GY IP 250 OP 250 PS 637: Performed by: INTERNAL MEDICINE

## 2023-09-11 PROCEDURE — 99239 HOSP IP/OBS DSCHRG MGMT >30: CPT | Performed by: PEDIATRICS

## 2023-09-11 PROCEDURE — 97530 THERAPEUTIC ACTIVITIES: CPT | Mod: GP

## 2023-09-11 PROCEDURE — 86140 C-REACTIVE PROTEIN: CPT

## 2023-09-11 PROCEDURE — 82040 ASSAY OF SERUM ALBUMIN: CPT | Performed by: PEDIATRICS

## 2023-09-11 PROCEDURE — 250N000013 HC RX MED GY IP 250 OP 250 PS 637: Performed by: PHYSICIAN ASSISTANT

## 2023-09-11 PROCEDURE — 82962 GLUCOSE BLOOD TEST: CPT

## 2023-09-11 PROCEDURE — 250N000013 HC RX MED GY IP 250 OP 250 PS 637: Performed by: PEDIATRICS

## 2023-09-11 PROCEDURE — G0378 HOSPITAL OBSERVATION PER HR: HCPCS

## 2023-09-11 PROCEDURE — 250N000011 HC RX IP 250 OP 636: Performed by: PHYSICIAN ASSISTANT

## 2023-09-11 PROCEDURE — 85652 RBC SED RATE AUTOMATED: CPT

## 2023-09-11 RX ORDER — FEBUXOSTAT 40 MG/1
40 TABLET, FILM COATED ORAL DAILY
Qty: 30 TABLET | Refills: 0 | Status: CANCELLED | OUTPATIENT
Start: 2023-09-11

## 2023-09-11 RX ORDER — COLCHICINE 0.6 MG/1
0.6 TABLET ORAL DAILY PRN
Qty: 30 TABLET | Refills: 0 | Status: ON HOLD | DISCHARGE
Start: 2023-09-11 | End: 2024-01-01

## 2023-09-11 RX ORDER — FEBUXOSTAT 40 MG/1
40 TABLET, FILM COATED ORAL DAILY
Qty: 30 TABLET | Refills: 0 | DISCHARGE
Start: 2023-09-11 | End: 2023-09-27 | Stop reason: DRUGHIGH

## 2023-09-11 RX ORDER — POLYETHYLENE GLYCOL 3350 17 G/17G
17 POWDER, FOR SOLUTION ORAL 3 TIMES DAILY PRN
Status: DISCONTINUED | OUTPATIENT
Start: 2023-09-11 | End: 2023-09-11 | Stop reason: HOSPADM

## 2023-09-11 RX ORDER — FEBUXOSTAT 40 MG/1
40 TABLET, FILM COATED ORAL DAILY
Status: DISCONTINUED | OUTPATIENT
Start: 2023-09-11 | End: 2023-09-11 | Stop reason: HOSPADM

## 2023-09-11 RX ORDER — COLCHICINE 0.6 MG/1
0.6 TABLET ORAL DAILY PRN
Status: DISCONTINUED | OUTPATIENT
Start: 2023-09-11 | End: 2023-09-11 | Stop reason: HOSPADM

## 2023-09-11 RX ORDER — COLCHICINE 0.6 MG/1
0.6 TABLET ORAL DAILY PRN
Qty: 30 TABLET | Refills: 0 | Status: CANCELLED | OUTPATIENT
Start: 2023-09-11

## 2023-09-11 RX ADMIN — SENNOSIDES 8.6 MG: 8.6 TABLET, FILM COATED ORAL at 09:42

## 2023-09-11 RX ADMIN — HEPARIN SODIUM 5000 UNITS: 5000 INJECTION, SOLUTION INTRAVENOUS; SUBCUTANEOUS at 14:36

## 2023-09-11 RX ADMIN — COLCHICINE 0.6 MG: 0.6 TABLET, FILM COATED ORAL at 09:42

## 2023-09-11 RX ADMIN — POLYETHYLENE GLYCOL 3350 17 G: 17 POWDER, FOR SOLUTION ORAL at 00:40

## 2023-09-11 RX ADMIN — BUMETANIDE 3 MG: 2 TABLET ORAL at 09:41

## 2023-09-11 RX ADMIN — Medication 12.5 MG: at 09:42

## 2023-09-11 RX ADMIN — INSULIN GLARGINE 26 UNITS: 100 INJECTION, SOLUTION SUBCUTANEOUS at 09:38

## 2023-09-11 RX ADMIN — HEPARIN SODIUM 5000 UNITS: 5000 INJECTION, SOLUTION INTRAVENOUS; SUBCUTANEOUS at 05:58

## 2023-09-11 RX ADMIN — FERROUS GLUCONATE 324 MG: 324 TABLET ORAL at 09:43

## 2023-09-11 RX ADMIN — PREGABALIN 100 MG: 100 CAPSULE ORAL at 09:42

## 2023-09-11 RX ADMIN — PSYLLIUM HUSK 1 PACKET: 3.4 POWDER ORAL at 09:50

## 2023-09-11 RX ADMIN — CEPHALEXIN 500 MG: 500 CAPSULE ORAL at 09:42

## 2023-09-11 RX ADMIN — LOSARTAN POTASSIUM 25 MG: 25 TABLET, FILM COATED ORAL at 09:42

## 2023-09-11 RX ADMIN — VENLAFAXINE HYDROCHLORIDE 75 MG: 75 CAPSULE, EXTENDED RELEASE ORAL at 09:42

## 2023-09-11 RX ADMIN — LEVOTHYROXINE SODIUM 150 MCG: 75 TABLET ORAL at 09:42

## 2023-09-11 RX ADMIN — ANASTROZOLE 1 MG: 1 TABLET, COATED ORAL at 09:42

## 2023-09-11 RX ADMIN — POLYETHYLENE GLYCOL 3350 17 G: 17 POWDER, FOR SOLUTION ORAL at 04:36

## 2023-09-11 RX ADMIN — ASPIRIN 81 MG: 81 TABLET, COATED ORAL at 09:42

## 2023-09-11 RX ADMIN — MICONAZOLE NITRATE: 20 POWDER TOPICAL at 09:52

## 2023-09-11 RX ADMIN — EMPAGLIFLOZIN 25 MG: 25 TABLET, FILM COATED ORAL at 09:42

## 2023-09-11 RX ADMIN — FEBUXOSTAT 40 MG: 40 TABLET ORAL at 14:36

## 2023-09-11 RX ADMIN — POLYETHYLENE GLYCOL 3350 17 G: 17 POWDER, FOR SOLUTION ORAL at 09:43

## 2023-09-11 RX ADMIN — TOLTERODINE 4 MG: 4 CAPSULE, EXTENDED RELEASE ORAL at 09:42

## 2023-09-11 ASSESSMENT — ACTIVITIES OF DAILY LIVING (ADL)
ADLS_ACUITY_SCORE: 65
ADLS_ACUITY_SCORE: 65
ADLS_ACUITY_SCORE: 63
ADLS_ACUITY_SCORE: 65
ADLS_ACUITY_SCORE: 67
ADLS_ACUITY_SCORE: 65

## 2023-09-11 NOTE — PROGRESS NOTES
"Care Management Discharge Note    Discharge Date: 9/11/2023: 3:40-4:25PM     Discharge Disposition: Transitional Care    Discharge Services:  TCU: stretcher ride    Discharge DME:  None    Discharge Transportation:  [facility] ([phone])     Private pay costs discussed: Not applicable    Does the patient's insurance plan have a 3 day qualifying hospital stay waiver?  Yes   Will the waiver be used for post-acute placement? Yes    PAS Confirmation Code: [id number]  Patient/family educated on Medicare website which has current facility and service quality ratings: yes    Education Provided on the Discharge Plan: Yes  Persons Notified of Discharge Plans: Patient, Facility, Provider, friend, nurses, charge  Patient/Family in Agreement with the Plan: yes    Handoff Referral Completed: Yes    Additional Information:  Spoke with [facility] in admissions: [phone] stating that if her wound care can be switched and ordered the generic version being [facility]: [employer] PRN, they can accept her today. The ride would need to be setup for 3-5PM with orders faxed to them no later than 1PM.     Paged provider to make sure orders were done and IntraDRY was taken off the AVS and [facility] is under the \"Future tests that were ordered for you \" tab.    Sent AVS via Fax to [facility]: [phone].    Set up [facility] ([phone]) stretcher ride (due to confusion) pickup window: 3:40-4:25      Handoff done.    PCS done and in folder.     IMM done. Copy offered and declined by signer (friend on face sheet). Original in chart.         NAKITA Lucio, LGSW  Float   Covering 6MS SW  Ph: [phone]     "

## 2023-09-11 NOTE — PROGRESS NOTES
6MS DISCHARGE    D: Patient discharged to [facility] at 4pm. Patient accompanied by EMS transport and caregiver/roommate.    I: All discharge medications and instructions reviewed with patient. Patient instructed to seek care if experiencing worsening symptom. Other phone numbers to call with questions or concerns after discharge reviewed. Education completed.    A: Patient verbalized understanding of discharge medications and instructions. Prescribed home medications given to patient.  Belongings returned to patient.    P: Patient to follow-up within 7 days of discharge with primary MD.

## 2023-09-11 NOTE — DISCHARGE SUMMARY
[facility]  Hospitalist Discharge Summary      Date of Admission:  9/6/2023  Date of Discharge:  9/11/2023  Discharging Provider: Meet Vigil DO  Discharge Service: Hospitalist Service, GOLD TEAM 22    Discharge Diagnoses   Generalized weakness  Bilateral lower extremity lymphedema  Ambulatory dysfunction  Right wrist gout acute without tophi  Hyperuricemia  Right hand cellulitis  CKD IIIb    Clinically Significant Risk Factors     # DMII: A1C = 7.6 % (Ref range: <5.7 %) within past 6 months       Follow-ups Needed After Discharge   Follow-up Appointments     Follow Up and recommended labs and tests      Patient should follow up with primary care in 2 weeks for repeat uric   acid level and potential doubling of febuxostat.        Repeat Uric Acid around 9/25 (after 2 weeks febuxostat), if > 6 double dose to 80 mg daily    Unresulted Labs Ordered in the Past 30 Days of this Admission       Date and Time Order Name Status Description    9/7/2023  9:00 AM Synovial fluid Aerobic Bacterial Culture Routine Preliminary     9/7/2023  9:00 AM Anaerobic Bacterial Culture Routine Preliminary     9/7/2023  9:00 AM Fungal or Yeast Culture Routine Preliminary         These results will be followed up by hospitalist    Discharge Disposition   Discharged to rehabilitation facility  Condition at discharge: Stable    Hospital Course   Mariel Ribeiro is a 78 year old female patient with a past medical history significant for HFpEF (EF 45-50%) s/p CardioMem implant (goal PA diastolic 14-18), CAD s/p PCI x 2 to LAD and CABG 2018 (LIMA-> LAD, SVG->OM2 and RPDA), BELEN on CPAP, HTN, HLD, thyroid cancer s/p thyroidectomy with resultant hypothyroidism, BELEN, T2DM c/b CKD3, lymphedema, depression, anxiety, insomnia, GERD, gout, chronic pain, COPD, morbid obesity, 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    Dispo: TCU    Failure to Thrive with Generalized Weakness  Bilateral Lower Extremity Lymphedema  Encephalopathy of uncertain etiology  Waxes and wanes; encephalopathy is comparable to prior admissions per roommate and has improved since admission. We instituted delirium precautions and had PT/OT work with patient. Stopped her repaglinide at discharge as this causes fluid retention; roommate reports lymphedema looks better since that drug has been on hold (since admission) and this was one of the reasons she was having trouble ambulating. Encephalopathy previously worked up and felt to be multifactorial (mood/anxiety, pain, sleep difficulties, sleep apnea, fatigue, COPD, cardiac issues, vascular risk factors, and/or kidney dysfunction) - see neuropsych testing from April 2022 with Dr. Shreyas Cole. Patient has untreated BELEN (hasn't tolerated CPAP/BiPAP) but CO2 from [facility] day after arrival was wnl. No other additional infectious concerns or organic cause elicited during stay.     R Hand Cellulitis vs Gout  Probably gout; ortho aspirated wrist with minimal output and some chalky white substance, wbcs without bacteria and cultures ngtd; finished 5 days cephalexin. Pain and swelling improved with colchicine, could only dose 0.6 mg daily due to CKD IIIb. Formerly on allopurinol but had a significant rash, have discharged on febuxostat 40 mg daily which she appears to have tolerated in the past. Uric acid was 10 here, should be checked in 2 weeks (~9/25) -- if still >6 then febuxostat dose should be doubled to 80 mg daily.      Consultations This Hospital Stay   NURSING TO CONSULT FOR VASCULAR ACCESS CARE IP CONSULT  PHYSICAL THERAPY ADULT IP CONSULT  CARE MANAGEMENT / SOCIAL WORK IP CONSULT  OCCUPATIONAL THERAPY ADULT IP CONSULT  LYMPHEDEMA THERAPY IP CONSULT  ORTHOPAEDIC SURGERY ADULT/PEDS IP CONSULT  PHYSICAL THERAPY ADULT IP  CONSULT  OCCUPATIONAL THERAPY ADULT IP CONSULT    Code Status   Full Code    Time Spent on this Encounter   I, Meet Vigil DO, personally saw the patient today and spent greater than 30 minutes discharging this patient.       Meet Vigil DO  [facility]  [address] [place] [id number]  Phone: [phone]  Fax: [phone]  ______________________________________________________________________    Physical Exam   Vital Signs: Temp: 98.3  F (36.8  C) Temp src: Oral BP: (!) 167/65 Pulse: 65   Resp: 18 SpO2: 94 % O2 Device: None (Room air)    Weight: 294 lbs 15.61 oz  Sitting up in bed in no distress  Oriented to person only; speech is clear and coherent  RR and wob are normal  Right wrist swelling and ROM are improved, no redness or warmth       Primary Care Physician   Malika Wolf    Discharge Orders      General info for SNF    Length of Stay Estimate: Short Term Care: Estimated # of Days <30  Condition at Discharge: Improving  Level of care:skilled   Rehabilitation Potential: Good  Admission H&P remains valid and up-to-date: Yes  Recent Chemotherapy: N/A  Use Nursing Home Standing Orders: Yes     Mantoux instructions    Give two-step Mantoux (PPD) Per Facility Policy Yes     Follow Up and recommended labs and tests    Patient should follow up with primary care in 2 weeks for repeat uric acid level and potential doubling of febuxostat.     Reason for your hospital stay    Mariel was hospitalized for difficulty walking due to gout in the right wrist that prevented her from using her walker.     Activity - Up ad vaughn     Physical Therapy Adult Consult    Evaluate and treat as clinically indicated.    Reason:  difficulty ambulating, debility due to medical condition(s)     Occupational Therapy Adult Consult    Evaluate and treat as clinically indicated.    Reason:  difficulty ambulating, debility due to medical condition(s)     Diet    Follow this diet upon  discharge: Orders Placed This Encounter      Regular Diet Adult       Significant Results and Procedures   Most Recent 3 CBC's:  Recent Labs   Lab Test 09/10/23  1121 09/09/23  0812 09/08/23  0714   WBC 6.2 7.0 7.9   HGB 12.5 12.0 12.0   MCV 90 88 89    151 177     Most Recent 3 BMP's:  Recent Labs   Lab Test 09/11/23  0937 09/11/23  0653 09/11/23  0633 09/10/23  1445 09/10/23  1121 09/09/23  1146 09/09/23  0812   NA  --  139  --   --  139  --  138   POTASSIUM  --  3.7  --   --  3.9  --  3.5   CHLORIDE  --  102  --   --  99  --  103   CO2  --  23  --   --  27  --  24   BUN  --  55.0*  --   --  55.2*  --  50.7*   CR  --  1.60*  --   --  1.65*  --  1.67*   ANIONGAP  --  14  --   --  13  --  11   ANTOINETTE  --  9.6  --   --  10.0  --  9.4   * 158* 173*   < > 167*   < > 167*    < > = values in this interval not displayed.     Most Recent 2 LFT's:  Recent Labs   Lab Test 09/07/23  0641 09/06/23  1130 05/31/23  1201   AST 32  --  22   ALT 24 23 20   ALKPHOS 111* 117* 126*   BILITOTAL 0.5 0.6 0.3     Most Recent 3 INR's:  Recent Labs   Lab Test 05/12/20  1420 11/11/19  1851 07/06/18  0904   INR 1.01 1.04 1.09     7-Day Micro Results       Collected Updated Procedure Result Status      09/07/2023 1843 09/11/2023 0001 Fungal or Yeast Culture Routine [[id number]]   Synovial fluid from Wrist, Right    Preliminary result Component Value   Culture No growth after 3 days  [P]                09/07/2023 1843 09/11/2023 0001 Anaerobic Bacterial Culture Routine [[id number]]   Synovial fluid from Wrist, Right    Preliminary result Component Value   Culture No anaerobic organisms isolated after 3 days  [P]                09/07/2023 1843 09/11/2023 0724 Synovial fluid Aerobic Bacterial Culture Routine [[id number]]   Synovial fluid from Wrist, Right    Preliminary result Component Value   Culture No growth after 3 days  [P]                09/07/2023 1843 09/08/2023 0118 Gram Stain [[id number]]   Synovial fluid from  Synovium    Final result Component Value   Gram Stain Result No organisms seen   Gram Stain Result 3+ WBC seen   Predominantly PMNs                  Most Recent TSH and T4:  Recent Labs   Lab Test 09/07/23  0641 03/09/22  1304 04/29/21  1116   TSH 1.89   < > 7.12*   T4  --   --  0.87    < > = values in this interval not displayed.     Most Recent ESR & CRP:  Recent Labs   Lab Test 09/11/23  0653 09/06/23  1130 06/08/21  1711   SED 50*   < > 18   CRP  --   --  8.5*   CRPI 25.93*   < >  --     < > = values in this interval not displayed.   ,   Results for orders placed or performed during the hospital encounter of 09/06/23   US Upper Extremity Non Vascular Right    Narrative    EXAMINATION: US UPPER EXTREMITY NON VASCULAR RIGHT 9/7/2023 11:27 AM      CLINICAL HISTORY: Right wrist, rule out septic arthritis. Patient  likely has cellulitis/skin infection. Please evaluate for  intra-articular/joint effusion/fluid.    COMPARISON: None    FINDINGS:  Targeted grayscale and color Doppler sonography was performed in the  area of the right wrist. There is mild subcutaneous edema in the volar  aspect of the wrist, more pronounced along the radial aspect. No focal  fluid collection or abscess. No joint effusion visualized. No soft  tissue mass.        Impression    IMPRESSION:  Mild subcutaneous edema in the volar wrist without fluid collection,  abscess, or joint effusion.    ELBA CHRISTIANSON MD         SYSTEM ID:  [id number]     *Note: Due to a large number of results and/or encounters for the requested time period, some results have not been displayed. A complete set of results can be found in Results Review.       Discharge Medications   Current Discharge Medication List        START taking these medications    Details   colchicine (COLCRYS) 0.6 MG tablet Take 1 tablet (0.6 mg) by mouth daily as needed for gout pain  Qty: 30 tablet, Refills: 0    Associated Diagnoses: Chronic gout of right wrist due to renal impairment without  tophus      febuxostat (ULORIC) 40 MG TABS tablet Take 1 tablet (40 mg) by mouth daily  Qty: 30 tablet, Refills: 0    Associated Diagnoses: Chronic gout of right wrist due to renal impairment without tophus           CONTINUE these medications which have NOT CHANGED    Details   acetaminophen (TYLENOL) 500 MG tablet Take 1,000 mg by mouth every 6 hours as needed for mild pain      anastrozole (ARIMIDEX) 1 MG tablet Take 1 tablet (1 mg) by mouth daily  Qty: 90 tablet, Refills: 3    Associated Diagnoses: Ductal carcinoma in situ (DCIS) of left breast      aspirin (ASA) 81 MG EC tablet Take 1 tablet (81 mg) by mouth daily    Associated Diagnoses: (HFpEF) heart failure with preserved ejection fraction (H)      atorvastatin (LIPITOR) 40 MG tablet TAKE 1 TABLET(40 MG) BY MOUTH IN THE MORNING  Qty: 90 tablet, Refills: 1    Associated Diagnoses: Atherosclerosis of native coronary artery without angina pectoris, unspecified whether native or transplanted heart      bumetanide (BUMEX) 1 MG tablet Take 3 tablets (3 mg) by mouth daily  Qty: 270 tablet, Refills: 3    Comments: Change in dose, patient will call for refills.  Associated Diagnoses: Chronic diastolic heart failure (H)      cephALEXin (KEFLEX) 500 MG capsule Take 1 capsule (500 mg) by mouth 2 times daily for 10 days  Qty: 20 capsule, Refills: 0    Associated Diagnoses: Cellulitis of right wrist      dapagliflozin (FARXIGA) 10 MG TABS tablet Take 1 tablet (10 mg) by mouth daily  Qty: 90 tablet, Refills: 1    Associated Diagnoses: Chronic diastolic heart failure (H)      ferrous gluconate (FERGON) 324 (38 Fe) MG tablet TAKE 1 TABLET BY MOUTH EVERY MONDAY, WEDNESDAY, AND FRIDAY MORNING.  Qty: 36 tablet, Refills: 3    Associated Diagnoses: Chronic diastolic heart failure (H); Iron deficiency anemia secondary to inadequate dietary iron intake      LANTUS SOLOSTAR 100 UNIT/ML soln ADMINISTER 29 UNITS UNDER THE SKIN DAILY  Qty: 15 mL, Refills: 3    Associated Diagnoses:  Type 2 diabetes mellitus with stage 3 chronic kidney disease, with long-term current use of insulin, unspecified whether stage 3a or 3b CKD (H)      losartan (COZAAR) 25 MG tablet Take 1 tablet (25 mg) by mouth daily  Qty: 90 tablet, Refills: 3    Associated Diagnoses: Chronic diastolic heart failure (H)      metoprolol succinate ER (TOPROL XL) 25 MG 24 hr tablet Take 0.5 tablets (12.5 mg) by mouth every morning AND 1 tablet (25 mg) every evening.  Qty: 135 tablet, Refills: 3    Associated Diagnoses: Chronic diastolic heart failure (H)      potassium chloride ER (KLOR-CON M) 10 MEQ CR tablet Take 2 tablets (20 mEq) by mouth 2 times daily  Qty: 360 tablet, Refills: 3    Associated Diagnoses: Chronic diastolic heart failure (H)      pregabalin (LYRICA) 100 MG capsule TAKE 1 CAPSULE(100 MG) BY MOUTH TWICE DAILY  Qty: 180 capsule, Refills: 1    Associated Diagnoses: Pain in joint of left shoulder      PROAIR  (90 Base) MCG/ACT inhaler INHALE 2 PUFFS INTO THE LUNGS EVERY 6 HOURS  Qty: 25.5 g, Refills: 9    Comments: Pharmacy may dispense brand covered by insurance (Proair, or proventil or ventolin or generic albuterol inhaler)  Associated Diagnoses: Simple chronic bronchitis (H)      SYNTHROID 150 MCG tablet TAKE 1 TABLET(150 MCG) BY MOUTH DAILY  Qty: 90 tablet, Refills: 3    Associated Diagnoses: Other specified hypothyroidism      tolterodine ER (DETROL LA) 2 MG 24 hr capsule TAKE 2 CAPSULES BY MOUTH ONCE DAILY.  Qty: 180 capsule, Refills: 3    Associated Diagnoses: Mixed incontinence      venlafaxine (EFFEXOR XR) 75 MG 24 hr capsule Take 1 capsule (75 mg) by mouth daily  Qty: 90 capsule, Refills: 1    Associated Diagnoses: Recurrent major depressive disorder, in partial remission (H)      Continuous Blood Gluc  (FREESTYLE MARTY 14 DAY READER) JEZ 1 Units 4 times daily (before meals and nightly)  Qty: 1 Device, Refills: 0    Associated Diagnoses: Type 2 diabetes mellitus with stage 3a chronic kidney  "disease, with long-term current use of insulin (H)      Continuous Blood Gluc Sensor (FREESTYLE MARTY 14 DAY SENSOR) [facility] PLACE NEW SENSOR TO BACK OF ARM EVERY 14 DAYS TO MONITOR BLOOD SUGARS  Qty: 6 each, Refills: 4    Associated Diagnoses: Type 2 diabetes mellitus with stage 3b chronic kidney disease, with long-term current use of insulin (H)      EPINEPHrine (ANY BX GENERIC EQUIV) 0.3 MG/0.3ML injection 2-pack Inject 0.3 mLs (0.3 mg) into the muscle as needed for anaphylaxis May repeat one time in 5-15 minutes if response to initial dose is inadequate.  Qty: 2 each, Refills: 1    Associated Diagnoses: Anaphylactic reaction to bee sting, undetermined intent, sequela      miconazole (MICATIN/MICRO GUARD) 2 % external powder Apply topically as needed for itching or other Redness in bilateral groin and  clef  Qty: 43 g, Refills: 0    Associated Diagnoses: Intertrigo      nitroGLYcerin (NITROSTAT) 0.4 MG sublingual tablet For chest pain place 1 tablet under the tongue every 5 minutes for 3 doses. If symptoms persist 5 minutes after 1st dose call 911.  Qty: 25 tablet, Refills: 1    Associated Diagnoses: Atherosclerosis of native coronary artery without angina pectoris, unspecified whether native or transplanted heart      nystatin (MYCOSTATIN) 775361 UNIT/GM external ointment Apply topically 2 times daily Apply to external vagina 2 times daily.  Qty: 30 g, Refills: 3    Associated Diagnoses: Candidiasis of vulva and vagina      nystatin POWD 1 Dose 4 times daily  Qty: 1 each, Refills: 1    Associated Diagnoses: Candidiasis of skin      Skin Protectants, Misc. (INTERDRY AG TEXTILE 10\"X144\") SHEE Externally apply 1 Box topically daily Apply to areas of intertrigo prn  Qty: 1 each, Refills: 3    Comments: Please refill this-- not previous rx  Associated Diagnoses: Intertrigo           STOP taking these medications       repaglinide (PRANDIN) 1 MG tablet Comments:   Reason for Stopping:             Allergies "   Allergies   Allergen Reactions    Contrast Dye Anaphylaxis    Fish Allergy Anaphylaxis    Iodine Anaphylaxis    Oxycodone Other (See Comments)     Severe suicidal tendencies on this medication    Tree Nuts [Nuts] Anaphylaxis     Tree nuts only (peanuts ok)    Bactrim      Increased uric acid    Betadine [Povidone Iodine] Hives, Swelling and Difficulty breathing     Betadine    Combivent      Rash    Dulaglutide Other (See Comments)     Hx . Of thyroid cancer.    Fish Oil     Lisinopril Other (See Comments)     Scr/grf severely reduced.     Penicillins Rash    Allopurinol Rash    Latex Rash    Wool Fiber Rash

## 2023-09-11 NOTE — PROGRESS NOTES
[id number]    Plan of care reviewed with patient      VS: Vitals: BP (!) 160/59 (BP Location: Left arm, Patient Position: Supine, Cuff Size: Adult Regular)   Pulse 80   Temp 98.3  F (36.8  C) (Oral)   Resp 18   Wt 133.8 kg (294 lb 15.6 oz)   SpO2 93%   BMI 46.20 kg/m    BMI= Body mass index is 46.2 kg/m .    O2: Pt on RA SPO2 >90%   Output: Voids spontaneously without difficulty. Incont of B & B.   Last BM: Bowel present and active. LBM-9/10   Activity: Ambulate with assistance.   Skin: Redness in groin areas, bruising on abdomen, and LLE edema   Pain: Denies pain   CMS: Pt is alert and oriented to self, intermittent confusion.Denies numbness and tingling. Able to move all extremities as tolerated. Pt wear braces on both legs.   Dressing: No dressing provided   Diet: Reg diet   LDA: No PIV access   Equipment: IV pole and pump, Walker/cane. Personal belonging   Plan: TBD   Additional Info: Goal Evaluation: Pt is improving, Denies pain/ SOB . at 0630. Call light is within reach, bed in low/locked position. Will continue to follow POC

## 2023-09-11 NOTE — PLAN OF CARE
VS: Temp: 98.3  F (36.8  C) Temp src: Oral BP: (!) 160/59 Pulse: 80   Resp: 18 SpO2: 93 % O2 Device: None (Room air)      O2: Stable >90% on room air, denies SOB and chest pain, denies difficulty breathing, lung sounds clear and equal bilaterally.   Output: Voiding spontaneously and adequately   Last BM: Last BM 9/10/23, incontinent of bowel and bladder   Activity: Assist x2 with walker and gait belt   Skin: Bruising on arms, redness in groin area, edema in extremities,   Pain: Denies pain   Neuro: Oriented to self   Dressing: N/A   Diet: Regular diet   LDA: No IV   Equipment: Personal belongings in room, call light, IV ploe   Plan: Continue with plan of care.   Additional Info:

## 2023-09-12 ENCOUNTER — PATIENT OUTREACH (OUTPATIENT)
Dept: CARE COORDINATION | Facility: CLINIC | Age: 78
End: 2023-09-12
Payer: MEDICARE

## 2023-09-12 NOTE — LETTER
Ambulatory Care Coordination to TCU Hand In Communication:     Name: Mariel Ribeiro is a patient of Malika Wolf at [facility]  and I am the care coordinator.   CC Contact Information: Email: [email or web address]   Phone: [phone]     I would like to collaborate with the TCU Care team during this patient's stay; please invite me to any care conferences.     Please feel free to contact me with questions or further collaboration in discharge planning.      SONIA ZapataN, RN, PHN   Care Coordinator-[facility]  [phone]

## 2023-09-12 NOTE — PLAN OF CARE
Physical Therapy Discharge Summary    Reason for therapy discharge:    Discharged to transitional care facility.    Progress towards therapy goal(s). See goals on Care Plan in [facility] electronic health record for goal details.  Goals partially met.  Barriers to achieving goals:   discharge from facility.    Therapy recommendation(s):    Continued therapy is recommended.  Rationale/Recommendations:  continue to progress strength, endurance, safety with IND mobility toward PLOF.

## 2023-09-12 NOTE — PROGRESS NOTES
[facility] Care Resource [place]    Background: Transitional Care Management program identified per system criteria and reviewed by [facility] Resource [place] team for possible outreach.    Assessment: Upon chart review, CCR Team member will not proceed with patient outreach related to this episode of Transitional Care Management program due to reason below:    [facility] TCU: Patient discharged to TCU/ARU/LTACH and is established within [facility] Primary Care. Referral created for Primary Care-Care Coordination program.    Plan: Transitional Care Management episode addressed appropriately per reason noted above.      Polly Carvajal RN  Connected Care Resource [place], [facility]    *Connected Care Resource Team does NOT follow patient ongoing. Referrals are identified based on internal discharge reports and the outreach is to ensure patient has an understanding of their discharge instructions.

## 2023-09-13 ENCOUNTER — TELEPHONE (OUTPATIENT)
Dept: FAMILY MEDICINE | Facility: CLINIC | Age: 78
End: 2023-09-13
Payer: MEDICARE

## 2023-09-13 LAB — BACTERIA SNV CULT: NO GROWTH

## 2023-09-13 NOTE — TELEPHONE ENCOUNTER
Forms/Letter Request    Type of form/letter: Treatment/Order update/change in condition    Have you been seen for this request: N/A    Do we have the form/letter: Yes: placed in care team 2 providers sign folder    Who is the form from? Home care    Where did/will the form come from? form was faxed in    When is form/letter needed by: non giver    How would you like the form/letter returned: Fax : [phone]

## 2023-09-14 ENCOUNTER — TRANSITIONAL CARE UNIT VISIT (OUTPATIENT)
Dept: GERIATRICS | Facility: CLINIC | Age: 78
End: 2023-09-14
Payer: MEDICARE

## 2023-09-14 VITALS
HEART RATE: 71 BPM | WEIGHT: 293 LBS | RESPIRATION RATE: 18 BRPM | BODY MASS INDEX: 47.09 KG/M2 | OXYGEN SATURATION: 96 % | SYSTOLIC BLOOD PRESSURE: 149 MMHG | DIASTOLIC BLOOD PRESSURE: 85 MMHG | TEMPERATURE: 97.8 F | HEIGHT: 66 IN

## 2023-09-14 DIAGNOSIS — N18.30 TYPE 2 DIABETES MELLITUS WITH STAGE 3 CHRONIC KIDNEY DISEASE, WITH LONG-TERM CURRENT USE OF INSULIN, UNSPECIFIED WHETHER STAGE 3A OR 3B CKD (H): ICD-10-CM

## 2023-09-14 DIAGNOSIS — M25.512 PAIN IN JOINT OF LEFT SHOULDER: ICD-10-CM

## 2023-09-14 DIAGNOSIS — I12.9 BENIGN HYPERTENSIVE KIDNEY DISEASE WITH CHRONIC KIDNEY DISEASE STAGE I THROUGH STAGE IV, OR UNSPECIFIED: ICD-10-CM

## 2023-09-14 DIAGNOSIS — C50.312 MALIGNANT NEOPLASM OF LOWER-INNER QUADRANT OF LEFT BREAST IN FEMALE, ESTROGEN RECEPTOR POSITIVE (H): ICD-10-CM

## 2023-09-14 DIAGNOSIS — D50.9 IRON DEFICIENCY ANEMIA, UNSPECIFIED IRON DEFICIENCY ANEMIA TYPE: ICD-10-CM

## 2023-09-14 DIAGNOSIS — J44.9 CHRONIC OBSTRUCTIVE PULMONARY DISEASE, UNSPECIFIED COPD TYPE (H): ICD-10-CM

## 2023-09-14 DIAGNOSIS — M10.9 ARTHRITIS OF RIGHT WRIST DUE TO GOUT: Primary | ICD-10-CM

## 2023-09-14 DIAGNOSIS — E11.22 TYPE 2 DIABETES MELLITUS WITH STAGE 3 CHRONIC KIDNEY DISEASE, WITH LONG-TERM CURRENT USE OF INSULIN, UNSPECIFIED WHETHER STAGE 3A OR 3B CKD (H): ICD-10-CM

## 2023-09-14 DIAGNOSIS — I25.10 CORONARY ARTERY DISEASE INVOLVING NATIVE CORONARY ARTERY OF NATIVE HEART WITHOUT ANGINA PECTORIS: ICD-10-CM

## 2023-09-14 DIAGNOSIS — E03.9 HYPOTHYROIDISM, UNSPECIFIED TYPE: ICD-10-CM

## 2023-09-14 DIAGNOSIS — I25.5 ISCHEMIC CARDIOMYOPATHY: ICD-10-CM

## 2023-09-14 DIAGNOSIS — Z79.4 TYPE 2 DIABETES MELLITUS WITH STAGE 3 CHRONIC KIDNEY DISEASE, WITH LONG-TERM CURRENT USE OF INSULIN, UNSPECIFIED WHETHER STAGE 3A OR 3B CKD (H): ICD-10-CM

## 2023-09-14 DIAGNOSIS — R53.81 PHYSICAL DECONDITIONING: ICD-10-CM

## 2023-09-14 DIAGNOSIS — Z17.0 MALIGNANT NEOPLASM OF LOWER-INNER QUADRANT OF LEFT BREAST IN FEMALE, ESTROGEN RECEPTOR POSITIVE (H): ICD-10-CM

## 2023-09-14 PROCEDURE — 99305 1ST NF CARE MODERATE MDM 35: CPT | Performed by: INTERNAL MEDICINE

## 2023-09-14 RX ORDER — PREGABALIN 100 MG/1
100 CAPSULE ORAL 2 TIMES DAILY
Qty: 60 CAPSULE | Refills: 1 | Status: SHIPPED | OUTPATIENT
Start: 2023-09-14 | End: 2023-01-01
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
## 2023-09-16 NOTE — PROGRESS NOTES
Mariel S Ribeiro  1945 September 16, 2023    Orders:     Farro wraps to lower extremities on am and off hs  Dx lymphedema  2.   ice to right wrist TID for 20 min and PRN  Dx gout  3.   Compression to R wrist  - ACE or glove to R wrist  Dx gout  4.   Please start a toileting program at least once/shift - goal is for her to transfer and to reduce time in incontinence brief   5.   Please update for glucose >300 (OK to leave on chart in work room)    Tricia Dick MD

## 2023-09-19 ENCOUNTER — TELEPHONE (OUTPATIENT)
Dept: GERIATRICS | Facility: CLINIC | Age: 78
End: 2023-09-19
Payer: MEDICARE

## 2023-09-19 ENCOUNTER — LAB REQUISITION (OUTPATIENT)
Dept: LAB | Facility: CLINIC | Age: 78
End: 2023-09-19
Payer: MEDICARE

## 2023-09-19 DIAGNOSIS — R31.9 HEMATURIA, UNSPECIFIED: ICD-10-CM

## 2023-09-19 LAB
ALBUMIN UR-MCNC: NEGATIVE MG/DL
APPEARANCE UR: CLEAR
BACTERIA #/AREA URNS HPF: ABNORMAL /HPF
BILIRUB UR QL STRIP: NEGATIVE
COLOR UR AUTO: ABNORMAL
GLUCOSE UR STRIP-MCNC: >=1000 MG/DL
HGB UR QL STRIP: ABNORMAL
KETONES UR STRIP-MCNC: NEGATIVE MG/DL
LEUKOCYTE ESTERASE UR QL STRIP: ABNORMAL
MUCOUS THREADS #/AREA URNS LPF: PRESENT /LPF
NITRATE UR QL: NEGATIVE
PH UR STRIP: 5 [PH] (ref 5–7)
RBC URINE: 3 /HPF
SP GR UR STRIP: 1.01 (ref 1–1.03)
SQUAMOUS EPITHELIAL: <1 /HPF
TRANSITIONAL EPI: <1 /HPF
UROBILINOGEN UR STRIP-MCNC: NORMAL MG/DL
WBC URINE: 26 /HPF

## 2023-09-19 PROCEDURE — 81001 URINALYSIS AUTO W/SCOPE: CPT | Performed by: INTERNAL MEDICINE

## 2023-09-19 PROCEDURE — 87088 URINE BACTERIA CULTURE: CPT | Performed by: INTERNAL MEDICINE

## 2023-09-19 NOTE — TELEPHONE ENCOUNTER
[facility] Triage Nurse Telephone Encounter    Provider: Tricia Dick MD  Facility: [facility] Facility Type:  TCU    Caller: Cynthia  Call Back Number: [phone]    Allergies:    Allergies   Allergen Reactions    Contrast Dye Anaphylaxis    Fish Allergy Anaphylaxis    Iodine Anaphylaxis    Oxycodone Other (See Comments)     Severe suicidal tendencies on this medication    Tree Nuts [Nuts] Anaphylaxis     Tree nuts only (peanuts ok)    Bactrim      Increased uric acid    Betadine [Povidone Iodine] Hives, Swelling and Difficulty breathing     Betadine    Combivent      Rash    Dulaglutide Other (See Comments)     Hx . Of thyroid cancer.    Fish Oil     Lisinopril Other (See Comments)     Scr/grf severely reduced.     Penicillins Rash    Allopurinol Rash    Latex Rash    Wool Fiber Rash        Reason for call: Nurse is reporting that patient was on the toilet and when she got up from the toilet, they noticed as drop of blood on the toilet seat.  The blood appeared to have come from the vagina.  VS are stable.  No c/o pain.      Verbal Order/Direction given by Provider: Obtain a clean catch [facility].  Continue to monitor.  MD to follow up on 9/21/23.      Provider giving Order:  Tricia Dick MD    Verbal Order given to: Cynthia Romero RN

## 2023-09-20 RX ORDER — CEPHALEXIN 500 MG/1
500 CAPSULE ORAL 2 TIMES DAILY
COMMUNITY
Start: 2023-09-15 | End: 2023-09-21

## 2023-09-21 ENCOUNTER — TRANSITIONAL CARE UNIT VISIT (OUTPATIENT)
Dept: GERIATRICS | Facility: CLINIC | Age: 78
End: 2023-09-21
Payer: MEDICARE

## 2023-09-21 VITALS
OXYGEN SATURATION: 97 % | TEMPERATURE: 97.3 F | HEIGHT: 66 IN | SYSTOLIC BLOOD PRESSURE: 154 MMHG | BODY MASS INDEX: 47.09 KG/M2 | RESPIRATION RATE: 16 BRPM | WEIGHT: 293 LBS | DIASTOLIC BLOOD PRESSURE: 61 MMHG | HEART RATE: 77 BPM

## 2023-09-21 DIAGNOSIS — I12.9 BENIGN HYPERTENSIVE KIDNEY DISEASE WITH CHRONIC KIDNEY DISEASE STAGE I THROUGH STAGE IV, OR UNSPECIFIED: ICD-10-CM

## 2023-09-21 DIAGNOSIS — I25.5 ISCHEMIC CARDIOMYOPATHY: ICD-10-CM

## 2023-09-21 DIAGNOSIS — N18.30 TYPE 2 DIABETES MELLITUS WITH STAGE 3 CHRONIC KIDNEY DISEASE, WITH LONG-TERM CURRENT USE OF INSULIN, UNSPECIFIED WHETHER STAGE 3A OR 3B CKD (H): ICD-10-CM

## 2023-09-21 DIAGNOSIS — M10.9 ARTHRITIS OF RIGHT WRIST DUE TO GOUT: Primary | ICD-10-CM

## 2023-09-21 DIAGNOSIS — R53.81 PHYSICAL DECONDITIONING: ICD-10-CM

## 2023-09-21 DIAGNOSIS — M25.531 RIGHT WRIST PAIN: ICD-10-CM

## 2023-09-21 DIAGNOSIS — E11.22 TYPE 2 DIABETES MELLITUS WITH STAGE 3 CHRONIC KIDNEY DISEASE, WITH LONG-TERM CURRENT USE OF INSULIN, UNSPECIFIED WHETHER STAGE 3A OR 3B CKD (H): ICD-10-CM

## 2023-09-21 DIAGNOSIS — G89.29 CHRONIC BACK PAIN, UNSPECIFIED BACK LOCATION, UNSPECIFIED BACK PAIN LATERALITY: ICD-10-CM

## 2023-09-21 DIAGNOSIS — N39.0 PSEUDOMONAS URINARY TRACT INFECTION: ICD-10-CM

## 2023-09-21 DIAGNOSIS — M54.9 CHRONIC BACK PAIN, UNSPECIFIED BACK LOCATION, UNSPECIFIED BACK PAIN LATERALITY: ICD-10-CM

## 2023-09-21 DIAGNOSIS — Z79.4 TYPE 2 DIABETES MELLITUS WITH STAGE 3 CHRONIC KIDNEY DISEASE, WITH LONG-TERM CURRENT USE OF INSULIN, UNSPECIFIED WHETHER STAGE 3A OR 3B CKD (H): ICD-10-CM

## 2023-09-21 DIAGNOSIS — B96.5 PSEUDOMONAS URINARY TRACT INFECTION: ICD-10-CM

## 2023-09-21 LAB
BACTERIA SNV CULT: NORMAL
BACTERIA UR CULT: ABNORMAL

## 2023-09-21 PROCEDURE — 99310 SBSQ NF CARE HIGH MDM 45: CPT | Performed by: INTERNAL MEDICINE

## 2023-09-21 RX ORDER — CIPROFLOXACIN 250 MG/1
250 TABLET, FILM COATED ORAL 2 TIMES DAILY
COMMUNITY
Start: 2023-09-21 | End: 2023-09-23

## 2023-09-21 NOTE — PROGRESS NOTES
"[facility] GERIATRICS  TCU Episodic Visit   September 21, 2023      Chief Complaint   Patient presents with    RECHECK       HPI:    Mariel Ribeiro is a 78 year old  (1945), who is being seen today for an episodic care visit at [facility] where she is doing rehab after a hospitalization with R wrist cellulitis and gout.     Nursing noted a \"drop\" of blood when toileting on 9/19. [facility]A/[facility] was done in case of UTI and has returned with pan sensitive Pseudomonas. The cephalexin for her wrist has no activity against Pseudomonas. No fevers.     Today, Ms. Ribeiro is seen sitting in her wheelchair after breakfast and then again with PT. She continues to have a lot of pain in her right wrist and it's limiting her ability to work with therapies. PT was wondering about X-rays - she had them in the hospital along with joint aspiration with negative cultures. She ACE wraps helps when it is on for some support. Talked about adding a topical analgesic to the wrist and Ms. Ribeiro is OK with this. Updated roommate, Odalis, re: above plan. No additional concerns today per nursing.     ALLERGIES: Contrast dye, Fish allergy, Iodine, Oxycodone, Tree nuts [nuts], Bactrim, Betadine [povidone iodine], Combivent, Dulaglutide, Fish oil, Lisinopril, Penicillins, Allopurinol, Latex, and Wool fiber    Past Medical, Surgical, Family and Social History reviewed and updated in EPIC.    MEDICATIONS  Current Outpatient Medications   Medication Sig Dispense Refill    acetaminophen (TYLENOL) 500 MG tablet Take 1,000 mg by mouth every 6 hours as needed for mild pain      anastrozole (ARIMIDEX) 1 MG tablet Take 1 tablet (1 mg) by mouth daily 90 tablet 3    aspirin (ASA) 81 MG EC tablet Take 1 tablet (81 mg) by mouth daily      atorvastatin (LIPITOR) 40 MG tablet TAKE 1 TABLET(40 MG) BY MOUTH IN THE MORNING 90 tablet 1    bumetanide (BUMEX) 1 MG tablet Take 3 tablets (3 mg) by mouth daily 270 tablet 3    cephALEXin (KEFLEX) 500 MG " capsule Take 500 mg by mouth 2 times daily      ciprofloxacin (CIPRO) 250 MG tablet Take 250 mg by mouth 2 times daily      colchicine (COLCRYS) 0.6 MG tablet Take 1 tablet (0.6 mg) by mouth daily as needed for gout pain 30 tablet 0    dapagliflozin (FARXIGA) 10 MG TABS tablet Take 1 tablet (10 mg) by mouth daily 90 tablet 1    diclofenac (VOLTAREN) 1 % topical gel Apply 2 g topically 3 times daily To right wrist      EPINEPHrine (ANY BX GENERIC EQUIV) 0.3 MG/0.3ML injection 2-pack Inject 0.3 mLs (0.3 mg) into the muscle as needed for anaphylaxis May repeat one time in 5-15 minutes if response to initial dose is inadequate. 2 each 1    febuxostat (ULORIC) 40 MG TABS tablet Take 1 tablet (40 mg) by mouth daily 30 tablet 0    ferrous gluconate (FERGON) 324 (38 Fe) MG tablet TAKE 1 TABLET BY MOUTH EVERY MONDAY, WEDNESDAY, AND FRIDAY MORNING. 36 tablet 3    LANTUS SOLOSTAR 100 UNIT/ML soln ADMINISTER 29 UNITS UNDER THE SKIN DAILY (Patient taking differently: 29 Units every morning) 15 mL 3    losartan (COZAAR) 25 MG tablet Take 1 tablet (25 mg) by mouth daily 90 tablet 3    metoprolol succinate ER (TOPROL XL) 25 MG 24 hr tablet Take 0.5 tablets (12.5 mg) by mouth every morning AND 1 tablet (25 mg) every evening. 135 tablet 3    miconazole (MICATIN/MICRO GUARD) 2 % external powder Apply topically as needed for itching or other Redness in bilateral groin and katharine clef 43 g 0    nitroGLYcerin (NITROSTAT) 0.4 MG sublingual tablet For chest pain place 1 tablet under the tongue every 5 minutes for 3 doses. If symptoms persist 5 minutes after 1st dose call 911. 25 tablet 1    nystatin (MYCOSTATIN) 388808 UNIT/GM external ointment Apply topically 2 times daily Apply to external vagina 2 times daily. 30 g 3    nystatin POWD 1 Dose 4 times daily (Patient taking differently: Apply topically 4 times daily) 1 each 1    potassium chloride ER (KLOR-CON M) 10 MEQ CR tablet Take 2 tablets (20 mEq) by mouth 2 times daily 360 tablet 3     "pregabalin (LYRICA) 100 MG capsule Take 1 capsule (100 mg) by mouth 2 times daily 60 capsule 1    PROAIR  (90 Base) MCG/ACT inhaler INHALE 2 PUFFS INTO THE LUNGS EVERY 6 HOURS 25.5 g 9    SYNTHROID 150 MCG tablet TAKE 1 TABLET(150 MCG) BY MOUTH DAILY 90 tablet 3    tolterodine ER (DETROL LA) 2 MG 24 hr capsule TAKE 2 CAPSULES BY MOUTH ONCE DAILY. 180 capsule 3    venlafaxine (EFFEXOR XR) 75 MG 24 hr capsule Take 1 capsule (75 mg) by mouth daily 90 capsule 1    Continuous Blood Gluc  (FREESTYLE MARTY 14 DAY READER) JEZ 1 Units 4 times daily (before meals and nightly) 1 Device 0    Continuous Blood Gluc Sensor (FREESTYLE MARTY 14 DAY SENSOR) [facility] NEW SENSOR TO BACK OF ARM EVERY 14 DAYS TO MONITOR BLOOD SUGARS 6 each 4     Medications reviewed:  Medications reconciled to facility chart and changes were made to reflect current medications as identified as above med list. Below are the changes that were made:   Medications stopped since last EPIC medication reconciliation:   There are no discontinued medications.  Medications started since last [facility] medication reconciliation:  Orders Placed This Encounter   Medications    cephALEXin (KEFLEX) 500 MG capsule     Sig: Take 500 mg by mouth 2 times daily    ciprofloxacin (CIPRO) 250 MG tablet     Sig: Take 250 mg by mouth 2 times daily    diclofenac (VOLTAREN) 1 % topical gel     Sig: Apply 2 g topically 3 times daily To right wrist       REVIEW OF SYSTEMS:  Unable to be obtained due to cognitive impairment.     PHYSICAL EXAM:  BP (!) 154/61   Pulse 77   Temp 97.3  F (36.3  C)   Resp 16   Ht 1.676 m (5' 6\")   Wt 134.3 kg (296 lb)   SpO2 97%   BMI 47.78 kg/m    Gen: sitting in wheelchair, alert, cooperative and in no acute distress  Resp: breathing non labored, no tachypnea   Ext: R wrist looks better than last week - no redness and swelling is less, but still present; she is tender over the entire joint/carpals   Neuro: CX II-XII grossly in tact; " ROM in all four extremities grossly in tact    LABS & IMAGING  Recent Labs and Imaging:  Reviewed as per Epic    ASSESSMENT/PLAN:    Right Wrist Cellulitis vs Gout  Right Wrist Pain  New on febuxostat. Ongoing pain in her wrist. Have added ACE wrap and improvement in swelling, but still pain limiting her ability to move wrist and work with PT/OT.   -- add Diclofenac gel TID  -- cephalexin 500 mg BID x10 days (today is last day)  -- febuxostat 40 mg daily  -- colchicine 0.6 mg daily PRN for gout  -- uric acid level 9/26 - if >6 then double febuxostat    Pseudomonas UTI  Pan sensitive. Cephalexin for her wrist has no activity against Pseuudomonas.   -- cipro 250 mg BID x3 days  -- follow for clinical signs of infection     CAD s/p CABG  Ischemic Cardiomyopathy (EF 45-50%), HTN, HLD  SBPs 130s-150s, most 140s. Weight stable 295 lbs. Has a cardioMEMs (not being used at TCU)  -- ASA 81 mg daily, atorvastatin 40 mg daily, bumetanide 3 mg daily, losartan 25 mg daily, metoprolol XL 12.5 mg in the morning and 25 mg in the evening  -- follow BPs, weights, clinical volume status      Bilateral LE Lymphedema  Chronic  -- Farrow wraps   -- compression, elevation as able      DM, Type II  Hgb A1c 7.6 Repaglidine discontinued during hospitalization. Sugars are not in Matrix  -- dapagliflozin 10 mg daily   -- glargine 29 units daily  -- follow sugars and adjust insulin as needed    **will discharge home on 26 units - she's a bit higher at TCU (210s-260s)     Mild Major Recurrent Depression  Mood and spirits were pretty good this morning. She is really wanting to go home  -- venlafaxine 75 mg daily  -- supportive cares     Chronic Back Pain  -- APAP 1000 mg q6h PRN, pregabalin 100 mg BID     CKD, Stage III  Baseline Cr mid 1s. Cr 1.6 on 9/11  -- avoid nephrotoxic meds  -- periodic BMP pending length of stay at TCU     Cognitive Impairment  Had neuropsychological testing in April 2022 - a bit inconclusive  -- OT following      Fall  Physical Deconditioning  In setting of hospitalization and underlying medical conditions  -- ongoing PT/OT          Electronically signed by  Tricia Dick MD

## 2023-09-21 NOTE — LETTER
"    9/21/2023        RE: Mariel Ribeiro  [address] [place] [address]        [facility] GERIATRICS  TCU Episodic Visit   September 21, 2023      Chief Complaint   Patient presents with     RECHECK       HPI:    Mariel Ribeiro is a 78 year old  (1945), who is being seen today for an episodic care visit at [facility]CU where she is doing rehab after a hospitalization with R wrist cellulitis and gout.     Nursing noted a \"drop\" of blood when toileting on 9/19. UA/UC was done in case of UTI and has returned with pan sensitive Pseudomonas. The cephalexin for her wrist has no activity against Pseudomonas. No fevers.     Today, Ms. Ribeiro is seen sitting in her wheelchair after breakfast and then again with PT. She continues to have a lot of pain in her right wrist and it's limiting her ability to work with therapies. PT was wondering about X-rays - she had them in the hospital along with joint aspiration with negative cultures. She ACE wraps helps when it is on for some support. Talked about adding a topical analgesic to the wrist and Ms. Ribeiro is OK with this. Updated roommate, Odalis, re: above plan. No additional concerns today per nursing.     ALLERGIES: Contrast dye, Fish allergy, Iodine, Oxycodone, Tree nuts [nuts], Bactrim, Betadine [povidone iodine], Combivent, Dulaglutide, Fish oil, Lisinopril, Penicillins, Allopurinol, Latex, and Wool fiber    Past Medical, Surgical, Family and Social History reviewed and updated in EPIC.    MEDICATIONS  Current Outpatient Medications   Medication Sig Dispense Refill     acetaminophen (TYLENOL) 500 MG tablet Take 1,000 mg by mouth every 6 hours as needed for mild pain       anastrozole (ARIMIDEX) 1 MG tablet Take 1 tablet (1 mg) by mouth daily 90 tablet 3     aspirin (ASA) 81 MG EC tablet Take 1 tablet (81 mg) by mouth daily       atorvastatin (LIPITOR) 40 MG tablet TAKE 1 TABLET(40 MG) BY MOUTH IN THE MORNING 90 tablet 1     bumetanide (BUMEX) " 1 MG tablet Take 3 tablets (3 mg) by mouth daily 270 tablet 3     cephALEXin (KEFLEX) 500 MG capsule Take 500 mg by mouth 2 times daily       ciprofloxacin (CIPRO) 250 MG tablet Take 250 mg by mouth 2 times daily       colchicine (COLCRYS) 0.6 MG tablet Take 1 tablet (0.6 mg) by mouth daily as needed for gout pain 30 tablet 0     dapagliflozin (FARXIGA) 10 MG TABS tablet Take 1 tablet (10 mg) by mouth daily 90 tablet 1     diclofenac (VOLTAREN) 1 % topical gel Apply 2 g topically 3 times daily To right wrist       EPINEPHrine (ANY BX GENERIC EQUIV) 0.3 MG/0.3ML injection 2-pack Inject 0.3 mLs (0.3 mg) into the muscle as needed for anaphylaxis May repeat one time in 5-15 minutes if response to initial dose is inadequate. 2 each 1     febuxostat (ULORIC) 40 MG TABS tablet Take 1 tablet (40 mg) by mouth daily 30 tablet 0     ferrous gluconate (FERGON) 324 (38 Fe) MG tablet TAKE 1 TABLET BY MOUTH EVERY MONDAY, WEDNESDAY, AND FRIDAY MORNING. 36 tablet 3     LANTUS SOLOSTAR 100 UNIT/ML soln ADMINISTER 29 UNITS UNDER THE SKIN DAILY (Patient taking differently: 29 Units every morning) 15 mL 3     losartan (COZAAR) 25 MG tablet Take 1 tablet (25 mg) by mouth daily 90 tablet 3     metoprolol succinate ER (TOPROL XL) 25 MG 24 hr tablet Take 0.5 tablets (12.5 mg) by mouth every morning AND 1 tablet (25 mg) every evening. 135 tablet 3     miconazole (MICATIN/MICRO GUARD) 2 % external powder Apply topically as needed for itching or other Redness in bilateral groin and  clef 43 g 0     nitroGLYcerin (NITROSTAT) 0.4 MG sublingual tablet For chest pain place 1 tablet under the tongue every 5 minutes for 3 doses. If symptoms persist 5 minutes after 1st dose call 911. 25 tablet 1     nystatin (MYCOSTATIN) 302437 UNIT/GM external ointment Apply topically 2 times daily Apply to external vagina 2 times daily. 30 g 3     nystatin POWD 1 Dose 4 times daily (Patient taking differently: Apply topically 4 times daily) 1 each 1      "potassium chloride ER (KLOR-CON M) 10 MEQ CR tablet Take 2 tablets (20 mEq) by mouth 2 times daily 360 tablet 3     pregabalin (LYRICA) 100 MG capsule Take 1 capsule (100 mg) by mouth 2 times daily 60 capsule 1     PROAIR  (90 Base) MCG/ACT inhaler INHALE 2 PUFFS INTO THE LUNGS EVERY 6 HOURS 25.5 g 9     SYNTHROID 150 MCG tablet TAKE 1 TABLET(150 MCG) BY MOUTH DAILY 90 tablet 3     tolterodine ER (DETROL LA) 2 MG 24 hr capsule TAKE 2 CAPSULES BY MOUTH ONCE DAILY. 180 capsule 3     venlafaxine (EFFEXOR XR) 75 MG 24 hr capsule Take 1 capsule (75 mg) by mouth daily 90 capsule 1     Continuous Blood Gluc  (FREESTYLE MARTY 14 DAY READER) JEZ 1 Units 4 times daily (before meals and nightly) 1 Device 0     Continuous Blood Gluc Sensor (RobinhoodSTYLE MARTY 14 DAY SENSOR) [facility] PLACE NEW SENSOR TO BACK OF ARM EVERY 14 DAYS TO MONITOR BLOOD SUGARS 6 each 4     Medications reviewed:  Medications reconciled to facility chart and changes were made to reflect current medications as identified as above med list. Below are the changes that were made:   Medications stopped since last EPIC medication reconciliation:   There are no discontinued medications.  Medications started since last [facility] medication reconciliation:  Orders Placed This Encounter   Medications     cephALEXin (KEFLEX) 500 MG capsule     Sig: Take 500 mg by mouth 2 times daily     ciprofloxacin (CIPRO) 250 MG tablet     Sig: Take 250 mg by mouth 2 times daily     diclofenac (VOLTAREN) 1 % topical gel     Sig: Apply 2 g topically 3 times daily To right wrist       REVIEW OF SYSTEMS:  Unable to be obtained due to cognitive impairment.     PHYSICAL EXAM:  BP (!) 154/61   Pulse 77   Temp 97.3  F (36.3  C)   Resp 16   Ht 1.676 m (5' 6\")   Wt 134.3 kg (296 lb)   SpO2 97%   BMI 47.78 kg/m    Gen: sitting in wheelchair, alert, cooperative and in no acute distress  Resp: breathing non labored, no tachypnea   Ext: R wrist looks better than last week - no " redness and swelling is less, but still present; she is tender over the entire joint/carpals   Neuro: CX II-XII grossly in tact; ROM in all four extremities grossly in tact    LABS & IMAGING  Recent Labs and Imaging:  Reviewed as per Epic    ASSESSMENT/PLAN:    Right Wrist Cellulitis vs Gout  Right Wrist Pain  New on febuxostat. Ongoing pain in her wrist. Have added ACE wrap and improvement in swelling, but still pain limiting her ability to move wrist and work with PT/OT.   -- add Diclofenac gel TID  -- cephalexin 500 mg BID x10 days (today is last day)  -- febuxostat 40 mg daily  -- colchicine 0.6 mg daily PRN for gout  -- uric acid level 9/26 - if >6 then double febuxostat    Pseudomonas UTI  Pan sensitive. Cephalexin for her wrist has no activity against Pseuudomonas.   -- cipro 250 mg BID x3 days  -- follow for clinical signs of infection     CAD s/p CABG  Ischemic Cardiomyopathy (EF 45-50%), HTN, HLD  SBPs 130s-150s, most 140s. Weight stable 295 lbs. Has a cardioMEMs (not being used at TCU)  -- ASA 81 mg daily, atorvastatin 40 mg daily, bumetanide 3 mg daily, losartan 25 mg daily, metoprolol XL 12.5 mg in the morning and 25 mg in the evening  -- follow BPs, weights, clinical volume status      Bilateral LE Lymphedema  Chronic  -- Farrow wraps   -- compression, elevation as able      DM, Type II  Hgb A1c 7.6 Repaglidine discontinued during hospitalization. Sugars are not in Matrix  -- dapagliflozin 10 mg daily   -- glargine 29 units daily  -- follow sugars and adjust insulin as needed    **will discharge home on 26 units - she's a bit higher at TCU (210s-260s)     Mild Major Recurrent Depression  Mood and spirits were pretty good this morning. She is really wanting to go home  -- venlafaxine 75 mg daily  -- supportive cares     Chronic Back Pain  -- APAP 1000 mg q6h PRN, pregabalin 100 mg BID     CKD, Stage III  Baseline Cr mid 1s. Cr 1.6 on 9/11  -- avoid nephrotoxic meds  -- periodic BMP pending length of  stay at TCU     Cognitive Impairment  Had neuropsychological testing in April 2022 - a bit inconclusive  -- OT following     Fall  Physical Deconditioning  In setting of hospitalization and underlying medical conditions  -- ongoing PT/OT          Electronically signed by  Tricia Dick MD                        Sincerely,        Tricia Dick MD

## 2023-09-25 ENCOUNTER — LAB REQUISITION (OUTPATIENT)
Dept: LAB | Facility: CLINIC | Age: 78
End: 2023-09-25
Payer: MEDICARE

## 2023-09-25 DIAGNOSIS — M10.9 GOUT, UNSPECIFIED: ICD-10-CM

## 2023-09-26 ENCOUNTER — TRANSITIONAL CARE UNIT VISIT (OUTPATIENT)
Dept: GERIATRICS | Facility: CLINIC | Age: 78
End: 2023-09-26
Payer: MEDICARE

## 2023-09-26 DIAGNOSIS — M10.331 ACUTE GOUT DUE TO RENAL IMPAIRMENT INVOLVING RIGHT WRIST: Primary | ICD-10-CM

## 2023-09-26 DIAGNOSIS — Z79.4 TYPE 2 DIABETES MELLITUS WITH STAGE 3 CHRONIC KIDNEY DISEASE, WITH LONG-TERM CURRENT USE OF INSULIN, UNSPECIFIED WHETHER STAGE 3A OR 3B CKD (H): Chronic | ICD-10-CM

## 2023-09-26 DIAGNOSIS — N18.30 TYPE 2 DIABETES MELLITUS WITH STAGE 3 CHRONIC KIDNEY DISEASE, WITH LONG-TERM CURRENT USE OF INSULIN, UNSPECIFIED WHETHER STAGE 3A OR 3B CKD (H): Chronic | ICD-10-CM

## 2023-09-26 DIAGNOSIS — I89.0 LYMPHEDEMA: Chronic | ICD-10-CM

## 2023-09-26 DIAGNOSIS — E11.22 TYPE 2 DIABETES MELLITUS WITH STAGE 3 CHRONIC KIDNEY DISEASE, WITH LONG-TERM CURRENT USE OF INSULIN, UNSPECIFIED WHETHER STAGE 3A OR 3B CKD (H): Chronic | ICD-10-CM

## 2023-09-26 DIAGNOSIS — R53.1 GENERALIZED WEAKNESS: ICD-10-CM

## 2023-09-26 LAB — URATE SERPL-MCNC: 6.2 MG/DL (ref 2.4–5.7)

## 2023-09-26 PROCEDURE — 36415 COLL VENOUS BLD VENIPUNCTURE: CPT | Performed by: INTERNAL MEDICINE

## 2023-09-26 PROCEDURE — P9604 ONE-WAY ALLOW PRORATED TRIP: HCPCS | Performed by: INTERNAL MEDICINE

## 2023-09-26 PROCEDURE — 84550 ASSAY OF BLOOD/URIC ACID: CPT | Performed by: INTERNAL MEDICINE

## 2023-09-26 PROCEDURE — 99309 SBSQ NF CARE MODERATE MDM 30: CPT | Performed by: NURSE PRACTITIONER

## 2023-09-26 NOTE — LETTER
"    9/26/2023        RE: Mariel Ribeiro  [address] [place] [address]        [facility] GERIATRICS    Chief Complaint   Patient presents with     RECHECK     HPI:  Mariel Ribeiro is a 78 year old  (1945), who is being seen today for an episodic care visit at: [facility] ([facility]) [[id number]]. Today's concern is: DM2, wrist pain.     HPI: PMH of evan on cpap, htn, hld, thyroid cancer s/p thyroidectomy, DM2, CKD3/4, gout, depression/anxiety, lymphedema and weakness who presented to ED after being called to her home for lift assistance and weakness. In the hospital she was found to have encephalopathy slowly improving, FTT and weakness, and probably R wrist gout vs cellulitis. Finished cephalexin and now on febuxostat as she did not tolerate allopurinol.     Today: Seen today in u for routine visit, follow up on BG trends, recent increase in lantus from 26 --> 29 units. Continues to have hyperglycemia. Last evening 339, fastings trending around 200+. She is sleepy initially but does wake up to have a conversation and answer questions. Endorses pain in her R wrist. Ace wrap in place. Using tylenol.      Allergies, and PMH/PSH reviewed in [employer] today.  REVIEW OF SYSTEMS:  4 point ROS including Respiratory, CV, GI and , other than that noted in the HPI,  is negative    Objective:   BP (!) 141/72   Pulse 74   Temp 97.6  F (36.4  C)   Resp 18   Ht 1.676 m (5' 6\")   Wt 130 kg (286 lb 9.6 oz)   SpO2 97%   BMI 46.26 kg/m    General appearance: alert, cooperative.   Lungs: respirations unlabored,no wheezing or rales.   Cardiovascular: Regular rate and rhythm.   ABDOMEN: Globular and soft, non tender.    Extremities: Right wrist pink around joint.   Skin: No rashes or lesions  Neurologic: oriented. No focal deficits.   Psych: interacts well with caregivers,  pleasant      Recent labs in [facility] reviewed by me today.     Assessment/Plan:  1. Acute gout due to renal impairment involving right wrist  "   2. Type 2 diabetes mellitus with stage 3 chronic kidney disease, with long-term current use of insulin, unspecified whether stage 3a or 3b CKD (H)    3. Lymphedema    4. Generalized weakness      Acute gout of R wrist: uric acid pending. On febuxostat which is to be doubled if uric acid still > 6. Pain improving although wrist in ace wrap.   DM2: BG ranging 200-350. Will increase lantus to 32 and continue to follow.   Lymphedema. Does not have lypmh wraps on today. Non pitting.   Generalize weakness: multifactorial. Improving with therapies.     Electronically signed by: Merritt Alvarez, GHASSAN           Sincerely,        Merritt Alvarez, CNP

## 2023-09-27 ENCOUNTER — TELEPHONE (OUTPATIENT)
Dept: GERIATRICS | Facility: CLINIC | Age: 78
End: 2023-09-27
Payer: MEDICARE

## 2023-09-27 ENCOUNTER — LAB REQUISITION (OUTPATIENT)
Dept: LAB | Facility: CLINIC | Age: 78
End: 2023-09-27
Payer: MEDICARE

## 2023-09-27 VITALS
OXYGEN SATURATION: 97 % | TEMPERATURE: 97.6 F | HEART RATE: 74 BPM | HEIGHT: 66 IN | SYSTOLIC BLOOD PRESSURE: 141 MMHG | WEIGHT: 286.6 LBS | RESPIRATION RATE: 18 BRPM | BODY MASS INDEX: 46.06 KG/M2 | DIASTOLIC BLOOD PRESSURE: 72 MMHG

## 2023-09-27 DIAGNOSIS — N18.9 CHRONIC KIDNEY DISEASE, UNSPECIFIED: ICD-10-CM

## 2023-09-27 DIAGNOSIS — L03.113 CELLULITIS OF RIGHT UPPER LIMB: ICD-10-CM

## 2023-09-27 DIAGNOSIS — I50.32 CHRONIC DIASTOLIC (CONGESTIVE) HEART FAILURE (H): ICD-10-CM

## 2023-09-27 DIAGNOSIS — I13.0 HYPERTENSIVE HEART AND CHRONIC KIDNEY DISEASE WITH HEART FAILURE AND STAGE 1 THROUGH STAGE 4 CHRONIC KIDNEY DISEASE, OR UNSPECIFIED CHRONIC KIDNEY DISEASE (H): ICD-10-CM

## 2023-09-27 PROCEDURE — 81001 URINALYSIS AUTO W/SCOPE: CPT | Performed by: INTERNAL MEDICINE

## 2023-09-27 PROCEDURE — 87086 URINE CULTURE/COLONY COUNT: CPT | Performed by: INTERNAL MEDICINE

## 2023-09-27 RX ORDER — FEBUXOSTAT 80 MG/1
80 TABLET, FILM COATED ORAL DAILY
COMMUNITY
End: 2024-01-01

## 2023-09-27 NOTE — TELEPHONE ENCOUNTER
[facility] Triage Nurse Telephone Encounter    Provider: LEAH Richter  Facility: [facility] Facility Type:  TCU    Caller: Cynthia  Call Back Number: [phone]    Allergies:    Allergies   Allergen Reactions    Contrast Dye Anaphylaxis    Fish Allergy Anaphylaxis    Iodine Anaphylaxis    Oxycodone Other (See Comments)     Severe suicidal tendencies on this medication    Tree Nuts [Nuts] Anaphylaxis     Tree nuts only (peanuts ok)    Bactrim      Increased uric acid    Betadine [Povidone Iodine] Hives, Swelling and Difficulty breathing     Betadine    Combivent      Rash    Dulaglutide Other (See Comments)     Hx . Of thyroid cancer.    Fish Oil     Lisinopril Other (See Comments)     Scr/grf severely reduced.     Penicillins Rash    Allopurinol Rash    Latex Rash    Wool Fiber Rash        Reason for call: Nurse called to report uric acid level of 6.2.  Patient was stated on Voltaren gel and Febuxostat 40 mg daily last week due to gout flare up.      Verbal Order/Direction given by Provider: Increase Febuxostat to 80 mg po daily and recheck uric acid in 2 weeks.      Provider giving Order:  LEAH Richter    Verbal Order given to: Cynthia Vieira RN

## 2023-09-28 ENCOUNTER — LAB REQUISITION (OUTPATIENT)
Dept: LAB | Facility: CLINIC | Age: 78
End: 2023-09-28

## 2023-09-28 DIAGNOSIS — Z87.440 PERSONAL HISTORY OF URINARY (TRACT) INFECTIONS: ICD-10-CM

## 2023-09-28 LAB
ALBUMIN UR-MCNC: 10 MG/DL
ANION GAP SERPL CALCULATED.3IONS-SCNC: 14 MMOL/L (ref 7–15)
APPEARANCE UR: CLEAR
BACTERIA #/AREA URNS HPF: ABNORMAL /HPF
BASOPHILS # BLD AUTO: 0.1 10E3/UL (ref 0–0.2)
BASOPHILS NFR BLD AUTO: 1 %
BILIRUB UR QL STRIP: NEGATIVE
BUN SERPL-MCNC: 45.2 MG/DL (ref 8–23)
CALCIUM SERPL-MCNC: 9.9 MG/DL (ref 8.8–10.2)
CHLORIDE SERPL-SCNC: 102 MMOL/L (ref 98–107)
COLOR UR AUTO: ABNORMAL
CREAT SERPL-MCNC: 1.6 MG/DL (ref 0.51–0.95)
EGFRCR SERPLBLD CKD-EPI 2021: 33 ML/MIN/1.73M2
EOSINOPHIL # BLD AUTO: 0.3 10E3/UL (ref 0–0.7)
EOSINOPHIL NFR BLD AUTO: 3 %
ERYTHROCYTE [DISTWIDTH] IN BLOOD BY AUTOMATED COUNT: 12.9 % (ref 10–15)
GLUCOSE SERPL-MCNC: 157 MG/DL (ref 70–99)
GLUCOSE UR STRIP-MCNC: >=1000 MG/DL
HCO3 SERPL-SCNC: 23 MMOL/L (ref 22–29)
HCT VFR BLD AUTO: 39.4 % (ref 35–47)
HGB BLD-MCNC: 12.3 G/DL (ref 11.7–15.7)
HGB UR QL STRIP: NEGATIVE
HYALINE CASTS: 3 /LPF
IMM GRANULOCYTES # BLD: 0.1 10E3/UL
IMM GRANULOCYTES NFR BLD: 1 %
KETONES UR STRIP-MCNC: NEGATIVE MG/DL
LEUKOCYTE ESTERASE UR QL STRIP: ABNORMAL
LYMPHOCYTES # BLD AUTO: 2.1 10E3/UL (ref 0.8–5.3)
LYMPHOCYTES NFR BLD AUTO: 23 %
MCH RBC QN AUTO: 28.1 PG (ref 26.5–33)
MCHC RBC AUTO-ENTMCNC: 31.2 G/DL (ref 31.5–36.5)
MCV RBC AUTO: 90 FL (ref 78–100)
MONOCYTES # BLD AUTO: 0.6 10E3/UL (ref 0–1.3)
MONOCYTES NFR BLD AUTO: 7 %
MUCOUS THREADS #/AREA URNS LPF: PRESENT /LPF
NEUTROPHILS # BLD AUTO: 5.9 10E3/UL (ref 1.6–8.3)
NEUTROPHILS NFR BLD AUTO: 65 %
NITRATE UR QL: NEGATIVE
NRBC # BLD AUTO: 0 10E3/UL
NRBC BLD AUTO-RTO: 0 /100
PH UR STRIP: 5 [PH] (ref 5–7)
PLATELET # BLD AUTO: 182 10E3/UL (ref 150–450)
POTASSIUM SERPL-SCNC: 3.9 MMOL/L (ref 3.4–5.3)
RBC # BLD AUTO: 4.37 10E6/UL (ref 3.8–5.2)
RBC URINE: 5 /HPF
SODIUM SERPL-SCNC: 139 MMOL/L (ref 135–145)
SP GR UR STRIP: 1.02 (ref 1–1.03)
SQUAMOUS EPITHELIAL: <1 /HPF
TRANSITIONAL EPI: 1 /HPF
UROBILINOGEN UR STRIP-MCNC: NORMAL MG/DL
WBC # BLD AUTO: 9 10E3/UL (ref 4–11)
WBC CLUMPS #/AREA URNS HPF: PRESENT /HPF
WBC URINE: 62 /HPF
YEAST #/AREA URNS HPF: ABNORMAL /HPF

## 2023-09-28 PROCEDURE — 80048 BASIC METABOLIC PNL TOTAL CA: CPT | Performed by: INTERNAL MEDICINE

## 2023-09-28 PROCEDURE — P9604 ONE-WAY ALLOW PRORATED TRIP: HCPCS | Performed by: INTERNAL MEDICINE

## 2023-09-28 PROCEDURE — 36415 COLL VENOUS BLD VENIPUNCTURE: CPT | Performed by: INTERNAL MEDICINE

## 2023-09-28 PROCEDURE — 85025 COMPLETE CBC W/AUTO DIFF WBC: CPT | Performed by: INTERNAL MEDICINE

## 2023-09-29 ENCOUNTER — TRANSITIONAL CARE UNIT VISIT (OUTPATIENT)
Dept: GERIATRICS | Facility: CLINIC | Age: 78
End: 2023-09-29
Payer: MEDICARE

## 2023-09-29 VITALS
DIASTOLIC BLOOD PRESSURE: 62 MMHG | TEMPERATURE: 98 F | WEIGHT: 288.6 LBS | OXYGEN SATURATION: 98 % | BODY MASS INDEX: 46.58 KG/M2 | HEART RATE: 67 BPM | SYSTOLIC BLOOD PRESSURE: 152 MMHG | RESPIRATION RATE: 17 BRPM

## 2023-09-29 DIAGNOSIS — N18.30 TYPE 2 DIABETES MELLITUS WITH STAGE 3 CHRONIC KIDNEY DISEASE, WITH LONG-TERM CURRENT USE OF INSULIN, UNSPECIFIED WHETHER STAGE 3A OR 3B CKD (H): ICD-10-CM

## 2023-09-29 DIAGNOSIS — M25.531 RIGHT WRIST PAIN: ICD-10-CM

## 2023-09-29 DIAGNOSIS — I25.5 ISCHEMIC CARDIOMYOPATHY: ICD-10-CM

## 2023-09-29 DIAGNOSIS — I89.0 LYMPHEDEMA: Chronic | ICD-10-CM

## 2023-09-29 DIAGNOSIS — I10 HYPERTENSION GOAL BP (BLOOD PRESSURE) < 130/80: ICD-10-CM

## 2023-09-29 DIAGNOSIS — M10.9 ARTHRITIS OF RIGHT WRIST DUE TO GOUT: ICD-10-CM

## 2023-09-29 DIAGNOSIS — E11.22 TYPE 2 DIABETES MELLITUS WITH STAGE 3 CHRONIC KIDNEY DISEASE, WITH LONG-TERM CURRENT USE OF INSULIN, UNSPECIFIED WHETHER STAGE 3A OR 3B CKD (H): ICD-10-CM

## 2023-09-29 DIAGNOSIS — R41.0 CONFUSION: Primary | ICD-10-CM

## 2023-09-29 DIAGNOSIS — Z79.4 TYPE 2 DIABETES MELLITUS WITH STAGE 3 CHRONIC KIDNEY DISEASE, WITH LONG-TERM CURRENT USE OF INSULIN, UNSPECIFIED WHETHER STAGE 3A OR 3B CKD (H): ICD-10-CM

## 2023-09-29 PROBLEM — I65.29 CAROTID ARTERY OCCLUSION: Status: ACTIVE | Noted: 2023-09-29

## 2023-09-29 PROCEDURE — 99309 SBSQ NF CARE MODERATE MDM 30: CPT | Performed by: INTERNAL MEDICINE

## 2023-09-29 NOTE — PROGRESS NOTES
[facility] GERIATRICS  TCU Episodic Visit   September 29, 2023      Chief Complaint   Patient presents with    Nursing Home Acute       HPI:    Mariel Ribeiro is a 78 year old  (1945), who is being seen today for an episodic care visit at at [facility] TCU where she is doing rehab after a hospitalization with R wrist cellulitis and gout.     Last week we treated her with a course of cipro for a Pseudomonas UTI. Also added diclofenac gel for her R wrist swelling and pain in setting of gout.     Uric acid this week was >6 and febuxostat was increased starting 9/27. Glargine was also increased from 29 units ot 32 units due to hyperglycemia.     She is brighter in the mornings, but more tired after lunch. Her roommate, Odalis, notes she is just not herself yet. We rechecked labs yesterday and CBC and BMP look good. UA/UC is pending.     Today, Ms Ribeiro is seen in her room laying in bed just after 9am. She seems more confused to me and agree, she is not herself based on my prior visits with her. Talked with her roommate, Odalis, who very astutely asked about the Farxiga - this is a newer med for Mariel and now we have what looks like 3 UTIs this month. We are going to stop the dapagliflozin and await the culture results today. No fever. No leukocytosis yesterday on labs.     ALLERGIES: Contrast dye, Fish allergy, Iodine, Oxycodone, Tree nuts [nuts], Bactrim, Betadine [povidone iodine], Combivent, Dulaglutide, Fish oil, Lisinopril, Penicillins, Allopurinol, Latex, and Wool fiber    Past Medical, Surgical, Family and Social History reviewed and updated in EPIC.    MEDICATIONS  Current Outpatient Medications   Medication Sig Dispense Refill    acetaminophen (TYLENOL) 500 MG tablet Take 1,000 mg by mouth every 6 hours as needed for mild pain      anastrozole (ARIMIDEX) 1 MG tablet Take 1 tablet (1 mg) by mouth daily 90 tablet 3    aspirin (ASA) 81 MG EC tablet Take 1 tablet (81 mg) by mouth daily       atorvastatin (LIPITOR) 40 MG tablet TAKE 1 TABLET(40 MG) BY MOUTH IN THE MORNING 90 tablet 1    bumetanide (BUMEX) 1 MG tablet Take 3 tablets (3 mg) by mouth daily 270 tablet 3    colchicine (COLCRYS) 0.6 MG tablet Take 1 tablet (0.6 mg) by mouth daily as needed for gout pain 30 tablet 0    diclofenac (VOLTAREN) 1 % topical gel Apply 2 g topically 3 times daily To right wrist      EPINEPHrine (ANY BX GENERIC EQUIV) 0.3 MG/0.3ML injection 2-pack Inject 0.3 mLs (0.3 mg) into the muscle as needed for anaphylaxis May repeat one time in 5-15 minutes if response to initial dose is inadequate. 2 each 1    febuxostat (ULORIC) 80 MG TABS tablet Take 80 mg by mouth daily      ferrous gluconate (FERGON) 324 (38 Fe) MG tablet TAKE 1 TABLET BY MOUTH EVERY MONDAY, WEDNESDAY, AND FRIDAY MORNING. 36 tablet 3    LANTUS SOLOSTAR 100 UNIT/ML soln ADMINISTER 29 UNITS UNDER THE SKIN DAILY (Patient taking differently: 29 Units every morning Increased from 29 units to 32 units on .) 15 mL 3    losartan (COZAAR) 25 MG tablet Take 1 tablet (25 mg) by mouth daily 90 tablet 3    metoprolol succinate ER (TOPROL XL) 25 MG 24 hr tablet Take 0.5 tablets (12.5 mg) by mouth every morning AND 1 tablet (25 mg) every evening. 135 tablet 3    miconazole (MICATIN/MICRO GUARD) 2 % external powder Apply topically as needed for itching or other Redness in bilateral groin and katharine clef 43 g 0    nitroGLYcerin (NITROSTAT) 0.4 MG sublingual tablet For chest pain place 1 tablet under the tongue every 5 minutes for 3 doses. If symptoms persist 5 minutes after 1st dose call 911. 25 tablet 1    nystatin (MYCOSTATIN) 104463 UNIT/GM external ointment Apply topically 2 times daily Apply to external vagina 2 times daily. 30 g 3    nystatin POWD 1 Dose 4 times daily (Patient taking differently: Apply topically 4 times daily) 1 each 1    potassium chloride ER (KLOR-CON M) 10 MEQ CR tablet Take 2 tablets (20 mEq) by mouth 2 times daily 360 tablet 3    pregabalin  (LYRICA) 100 MG capsule Take 1 capsule (100 mg) by mouth 2 times daily 60 capsule 1    PROAIR  (90 Base) MCG/ACT inhaler INHALE 2 PUFFS INTO THE LUNGS EVERY 6 HOURS 25.5 g 9    SYNTHROID 150 MCG tablet TAKE 1 TABLET(150 MCG) BY MOUTH DAILY 90 tablet 3    tolterodine ER (DETROL LA) 2 MG 24 hr capsule TAKE 2 CAPSULES BY MOUTH ONCE DAILY. 180 capsule 3    venlafaxine (EFFEXOR XR) 75 MG 24 hr capsule Take 1 capsule (75 mg) by mouth daily 90 capsule 1    Continuous Blood Gluc  (FREESTYLE MARTY 14 DAY READER) JEZ 1 Units 4 times daily (before meals and nightly) 1 Device 0    Continuous Blood Gluc Sensor (iJouleSTYLE MARTY 14 DAY SENSOR) [facility] PLACE NEW SENSOR TO BACK OF ARM EVERY 14 DAYS TO MONITOR BLOOD SUGARS 6 each 4     Medications reviewed:  Medications reconciled to facility chart and changes were made to reflect current medications as identified as above med list. Below are the changes that were made:   Medications stopped since last EPIC medication reconciliation:   Medications Discontinued During This Encounter   Medication Reason    dapagliflozin (FARXIGA) 10 MG TABS tablet Med Rec(No AVS / No eCancel)     Medications started since last [facility] medication reconciliation:  No orders of the defined types were placed in this encounter.      REVIEW OF SYSTEMS:  Unable to be obtained due to cognitive impairment.     PHYSICAL EXAM:  BP (!) 152/62   Pulse 67   Temp 98  F (36.7  C)   Resp 17   Wt 130.9 kg (288 lb 9.6 oz)   SpO2 98%   BMI 46.58 kg/m    Gen: laying in bed, alert, cooperative and in no acute distress  HEENT: normocephalic; oropharynx clear  Resp: breathing non labored, no tachypnea   Ext: chronic LE lymphedema with Farrow wraps   Neuro: CX II-XII grossly in tact; ROM in all four extremities grossly in tact  Psych: alert and oriented to self and general situation today     LABS & IMAGING  Recent Labs and Imaging:  Reviewed as per Epic    ASSESSMENT/PLAN:    Increased Confusion  Looks like UA  is going to return as UTI - this would be the 3rd this month. She is on dapagliflozin.   -- will await culture results today and treat accordingly - no fever, no WBC on labs yesterday  -- discontinue dapagliflozin    Right Wrist Cellulitis vs Gout  Right Wrist Pain  New on febuxostat - this was doubled on 9/27 as uric acid was still >6 (6.2). Completed cephalexin x10 days on 9/21. Swelling is much better today.    -- add Diclofenac gel TID  -- febuxostat 80 mg daily  -- colchicine 0.6 mg daily PRN for gout  -- continue compression with ACE wrap, ice    CAD s/p CABG  Ischemic Cardiomyopathy (EF 45-50%), HTN, HLD  SBPs 140s-150s, most 140s. Weight down a bit from 295 lbs --> 288 lbs. Has a cardioMEMs (not being used at TCU)  -- ASA 81 mg daily, atorvastatin 40 mg daily, bumetanide 3 mg daily, losartan 25 mg daily, metoprolol XL 12.5 mg in the morning and 25 mg in the evening  -- follow BPs, weights, clinical volume status      Bilateral LE Lymphedema  Chronic  -- Farrow wraps   -- compression, elevation as able      DM, Type II  Hgb A1c 7.6 Repaglidine discontinued during hospitalization. Sugars are not in Matrix but review on the unit today -- 290s-330s.  -- discontinue dapagliflozin 10 mg daily - see above  -- glargine 32 units daily (increased from 29 units on 9/27)  -- follow sugars and adjust insulin as needed    **will likely discharge home on 26 units - she's a bit higher at TCU (210s-260s)      Electronically signed by  Tricia Dick MD

## 2023-09-29 NOTE — LETTER
9/29/2023        RE: Mariel Ribeiro  [address] [place] [address]        [facility]  [facility]U Episodic Visit   September 29, 2023      Chief Complaint   Patient presents with     Nursing Home Acute       HPI:    Mariel Ribeiro is a 78 year old  (1945), who is being seen today for [facility] after a hospitalization with R wrist cellulitis and gout.     Last week we treated her with a course of cipro for a Pseudomonas UTI. Also added diclofenac gel for her R wrist swelling and pain in setting of gout.     Uric acid this week was >6 and febuxostat was increased starting 9/27. Glargine was also increased from 29 units ot 32 units due to hyperglycemia.     She is brighter in the mornings, but more tired after lunch. Her roommate, Odalis, notes she is just not herself yet. We rechecked labs yesterday and CBC and BMP look good. UA/UC is pending.     Today, Ms Ribeiro is seen in her room laying in bed just after 9am. She seems more confused to me and agree, she is not herself based on my prior visits with her. Talked with her roommate, Odalis, who very astutely asked about the Farxiga - this is a newer med for Mariel and now we have what looks like 3 UTIs this month. We are going to stop the dapagliflozin and await the culture results today. No fever. No leukocytosis yesterday on labs.     ALLERGIES: Contrast dye, Fish allergy, Iodine, Oxycodone, Tree nuts [nuts], Bactrim, Betadine [povidone iodine], Combivent, Dulaglutide, Fish oil, Lisinopril, Penicillins, Allopurinol, Latex, and Wool fiber    Past Medical, Surgical, Family and Social History reviewed and updated in EPIC.    MEDICATIONS  Current Outpatient Medications   Medication Sig Dispense Refill     acetaminophen (TYLENOL) 500 MG tablet Take 1,000 mg by mouth every 6 hours as needed for mild pain       anastrozole (ARIMIDEX) 1 MG tablet Take 1 tablet (1 mg) by mouth daily  90 tablet 3     aspirin (ASA) 81 MG EC tablet Take 1 tablet (81 mg) by mouth daily       atorvastatin (LIPITOR) 40 MG tablet TAKE 1 TABLET(40 MG) BY MOUTH IN THE MORNING 90 tablet 1     bumetanide (BUMEX) 1 MG tablet Take 3 tablets (3 mg) by mouth daily 270 tablet 3     colchicine (COLCRYS) 0.6 MG tablet Take 1 tablet (0.6 mg) by mouth daily as needed for gout pain 30 tablet 0     diclofenac (VOLTAREN) 1 % topical gel Apply 2 g topically 3 times daily To right wrist       EPINEPHrine (ANY BX GENERIC EQUIV) 0.3 MG/0.3ML injection 2-pack Inject 0.3 mLs (0.3 mg) into the muscle as needed for anaphylaxis May repeat one time in 5-15 minutes if response to initial dose is inadequate. 2 each 1     febuxostat (ULORIC) 80 MG TABS tablet Take 80 mg by mouth daily       ferrous gluconate (FERGON) 324 (38 Fe) MG tablet TAKE 1 TABLET BY MOUTH EVERY MONDAY, WEDNESDAY, AND FRIDAY MORNING. 36 tablet 3     LANTUS SOLOSTAR 100 UNIT/ML soln ADMINISTER 29 UNITS UNDER THE SKIN DAILY (Patient taking differently: 29 Units every morning Increased from 29 units to 32 units on .) 15 mL 3     losartan (COZAAR) 25 MG tablet Take 1 tablet (25 mg) by mouth daily 90 tablet 3     metoprolol succinate ER (TOPROL XL) 25 MG 24 hr tablet Take 0.5 tablets (12.5 mg) by mouth every morning AND 1 tablet (25 mg) every evening. 135 tablet 3     miconazole (MICATIN/MICRO GUARD) 2 % external powder Apply topically as needed for itching or other Redness in bilateral groin and katharine clef 43 g 0     nitroGLYcerin (NITROSTAT) 0.4 MG sublingual tablet For chest pain place 1 tablet under the tongue every 5 minutes for 3 doses. If symptoms persist 5 minutes after 1st dose call 911. 25 tablet 1     nystatin (MYCOSTATIN) 310141 UNIT/GM external ointment Apply topically 2 times daily Apply to external vagina 2 times daily. 30 g 3     nystatin POWD 1 Dose 4 times daily (Patient taking differently: Apply topically 4 times daily) 1 each 1     potassium chloride ER  (KLOR-CON M) 10 MEQ CR tablet Take 2 tablets (20 mEq) by mouth 2 times daily 360 tablet 3     pregabalin (LYRICA) 100 MG capsule Take 1 capsule (100 mg) by mouth 2 times daily 60 capsule 1     PROAIR  (90 Base) MCG/ACT inhaler INHALE 2 PUFFS INTO THE LUNGS EVERY 6 HOURS 25.5 g 9     SYNTHROID 150 MCG tablet TAKE 1 TABLET(150 MCG) BY MOUTH DAILY 90 tablet 3     tolterodine ER (DETROL LA) 2 MG 24 hr capsule TAKE 2 CAPSULES BY MOUTH ONCE DAILY. 180 capsule 3     venlafaxine (EFFEXOR XR) 75 MG 24 hr capsule Take 1 capsule (75 mg) by mouth daily 90 capsule 1     Continuous Blood Gluc  (FREESTYLE MARTY 14 DAY READER) JEZ 1 Units 4 times daily (before meals and nightly) 1 Device 0     Continuous Blood Gluc Sensor (FREESTYLE MARTY 14 DAY SENSOR) [facility] PLACE NEW SENSOR TO BACK OF ARM EVERY 14 DAYS TO MONITOR BLOOD SUGARS 6 each 4     Medications reviewed:  Medications reconciled to facility chart and changes were made to reflect current medications as identified as above med list. Below are the changes that were made:   Medications stopped since last EPIC medication reconciliation:   Medications Discontinued During This Encounter   Medication Reason     dapagliflozin (FARXIGA) 10 MG TABS tablet Med Rec(No AVS / No eCancel)     Medications started since last [facility] medication reconciliation:  No orders of the defined types were placed in this encounter.      REVIEW OF SYSTEMS:  Unable to be obtained due to cognitive impairment.     PHYSICAL EXAM:  BP (!) 152/62   Pulse 67   Temp 98  F (36.7  C)   Resp 17   Wt 130.9 kg (288 lb 9.6 oz)   SpO2 98%   BMI 46.58 kg/m    Gen: laying in bed, alert, cooperative and in no acute distress  HEENT: normocephalic; oropharynx clear  Resp: breathing non labored, no tachypnea   Ext: chronic LE lymphedema with Farrow wraps   Neuro: CX II-XII grossly in tact; ROM in all four extremities grossly in tact  Psych: alert and oriented to self and general situation today     LABS &  IMAGING  Recent Labs and Imaging:  Reviewed as per Epic    ASSESSMENT/PLAN:    Increased Confusion  Looks like UA is going to return as UTI - this would be the 3rd this month. She is on dapagliflozin.   -- will await culture results today and treat accordingly - no fever, no WBC on labs yesterday  -- discontinue dapagliflozin    Right Wrist Cellulitis vs Gout  Right Wrist Pain  New on febuxostat - this was doubled on 9/27 as uric acid was still >6 (6.2). Completed cephalexin x10 days on 9/21. Swelling is much better today.    -- add Diclofenac gel TID  -- febuxostat 80 mg daily  -- colchicine 0.6 mg daily PRN for gout  -- continue compression with ACE wrap, ice    CAD s/p CABG  Ischemic Cardiomyopathy (EF 45-50%), HTN, HLD  SBPs 140s-150s, most 140s. Weight down a bit from 295 lbs --> 288 lbs. Has a cardioMEMs (not being used at TCU)  -- ASA 81 mg daily, atorvastatin 40 mg daily, bumetanide 3 mg daily, losartan 25 mg daily, metoprolol XL 12.5 mg in the morning and 25 mg in the evening  -- follow BPs, weights, clinical volume status      Bilateral LE Lymphedema  Chronic  -- Farrow wraps   -- compression, elevation as able      DM, Type II  Hgb A1c 7.6 Repaglidine discontinued during hospitalization. Sugars are not in Matrix but review on the unit today -- 290s-330s.  -- discontinue dapagliflozin 10 mg daily - see above  -- glargine 32 units daily (increased from 29 units on 9/27)  -- follow sugars and adjust insulin as needed    **will likely discharge home on 26 units - she's a bit higher at TCU (210s-260s)      Electronically signed by  Tricia Dick MD                        Sincerely,        Tricia Dick MD

## 2023-09-30 LAB — BACTERIA UR CULT: NORMAL

## 2023-10-03 ENCOUNTER — TRANSITIONAL CARE UNIT VISIT (OUTPATIENT)
Dept: GERIATRICS | Facility: CLINIC | Age: 78
End: 2023-10-03
Payer: MEDICARE

## 2023-10-03 VITALS
TEMPERATURE: 97.5 F | RESPIRATION RATE: 17 BRPM | DIASTOLIC BLOOD PRESSURE: 73 MMHG | HEIGHT: 66 IN | SYSTOLIC BLOOD PRESSURE: 145 MMHG | WEIGHT: 282.2 LBS | BODY MASS INDEX: 45.35 KG/M2 | HEART RATE: 71 BPM | OXYGEN SATURATION: 98 %

## 2023-10-03 DIAGNOSIS — N18.30 TYPE 2 DIABETES MELLITUS WITH STAGE 3 CHRONIC KIDNEY DISEASE, WITH LONG-TERM CURRENT USE OF INSULIN, UNSPECIFIED WHETHER STAGE 3A OR 3B CKD (H): Primary | ICD-10-CM

## 2023-10-03 DIAGNOSIS — R41.0 CONFUSION: ICD-10-CM

## 2023-10-03 DIAGNOSIS — Z79.4 TYPE 2 DIABETES MELLITUS WITH STAGE 3 CHRONIC KIDNEY DISEASE, WITH LONG-TERM CURRENT USE OF INSULIN, UNSPECIFIED WHETHER STAGE 3A OR 3B CKD (H): Primary | ICD-10-CM

## 2023-10-03 DIAGNOSIS — N39.0 UTI (URINARY TRACT INFECTION): ICD-10-CM

## 2023-10-03 DIAGNOSIS — E79.0 HYPERURICEMIA: ICD-10-CM

## 2023-10-03 DIAGNOSIS — E11.22 TYPE 2 DIABETES MELLITUS WITH STAGE 3 CHRONIC KIDNEY DISEASE, WITH LONG-TERM CURRENT USE OF INSULIN, UNSPECIFIED WHETHER STAGE 3A OR 3B CKD (H): Primary | ICD-10-CM

## 2023-10-03 DIAGNOSIS — M25.531 ARTHRALGIA OF RIGHT WRIST: ICD-10-CM

## 2023-10-03 PROCEDURE — 99316 NF DSCHRG MGMT 30 MIN+: CPT | Performed by: NURSE PRACTITIONER

## 2023-10-03 RX ORDER — INSULIN GLARGINE 100 [IU]/ML
32 INJECTION, SOLUTION SUBCUTANEOUS EVERY MORNING
Refills: 0 | COMMUNITY
Start: 2023-10-03 | End: 2023-01-01
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
## 2023-10-10 NOTE — PROGRESS NOTES
Mariel is a 78 year old who is being evaluated via a billable video visit.      How would you like to obtain your AVS? MyChart          Assessment & Plan     Hyperuricemia  Idiopathic chronic gout of multiple sites without tophus  Presumably gout exacerbation in right wrist as her wrist synovial fluid aspiration showed no growth.  Oddly there is no comment in the hospital records on whether this was examined for crystals (either in the ortho procedure note or on any lab orders).  She increased her Uloric dose to 80 mg about 2 weeks ago so we'll have home care RN recheck uric acid level in another 2 weeks.    - febuxostat (ULORIC) 80 MG TABS tablet  Dispense: 90 tablet; Refill: 0    Type 2 diabetes mellitus with stage 4 chronic kidney disease, with long-term current use of insulin (H)  She is back to her baseline insulin regimen of lantus 26 units QAM.  - insulin glargine (LANTUS SOLOSTAR) 100 UNIT/ML pen        MED REC REQUIRED  Post Medication Reconciliation Status:  Discharge medications reconciled and changed, see notes/orders      Rafaela Maxwell MD  [facility]   Mariel is a 78 year old, presenting for the following health issues:  Hospital F/U    Mariel is joined by her friend/caregiver Odalis    SOPHIA       Hospital Follow-up Visit:    Hospital/Nursing Home/IP Rehab Facility: [facility]  Date of Admission: 9/6/2023  Date of Discharge: 9/11/2023  Reason(s) for Admission: generalized weakness, encephalopathy, Rt hand cellulitis vs gout    Hospital/Nursing Home/IP Rehab Facility:  [facility]  Date of Admission: 9/11/2023  Date of Discharge: 10/5/2023  Reason(s) for Admission: weakness/deconditioning     Was your hospitalization related to COVID-19? No   Problems taking medications regularly:  None  Medication changes since discharge: None  Problems adhering to non-medication therapy:  None    Summary of  hospitalization:  [facility] and TCU discharge summaries reviewed  Diagnostic Tests/Treatments reviewed.  Follow up needed: none  Other Healthcare Providers Involved in Patient s Care:         Homecare  Update since discharge: improved.     Plan of care communicated with patient and caregiver       FROM HOSPITAL DISCHARGE SUMMARY:  Failure to Thrive with Generalized Weakness  Bilateral Lower Extremity Lymphedema  Encephalopathy of uncertain etiology  Waxes and wanes; encephalopathy is comparable to prior admissions per roommate and has improved since admission. We instituted delirium precautions and had PT/OT work with patient. Stopped her repaglinide at discharge as this causes fluid retention; roommate reports lymphedema looks better since that drug has been on hold (since admission) and this was one of the reasons she was having trouble ambulating. Encephalopathy previously worked up and felt to be multifactorial (mood/anxiety, pain, sleep difficulties, sleep apnea, fatigue, COPD, cardiac issues, vascular risk factors, and/or kidney dysfunction) - see neuropsych testing from April 2022 with Dr. Shreyas Cole. Patient has untreated BELEN (hasn't tolerated CPAP/BiPAP) but CO2 from [facility] day after arrival was wnl. No other additional infectious concerns or organic cause elicited during stay.      R Hand Cellulitis vs Gout  Probably gout; ortho aspirated wrist with minimal output and some chalky white substance, wbcs without bacteria and cultures ngtd; finished 5 days cephalexin. Pain and swelling improved with colchicine, could only dose 0.6 mg daily due to CKD IIIb. Formerly on allopurinol but had a significant rash, have discharged on febuxostat 40 mg daily which she appears to have tolerated in the past. Uric acid was 10 here, should be checked in 2 weeks (~9/25) -- if still >6 then febuxostat dose should be doubled to 80 mg daily.    FROM TCU DISCHARGE SUMMARY:   NURSING FACILITY COURSE  "  Medication Changes/Rationale:   Lantus increaesed 26 --> 32. Discussed with roommate, Odalis. She will resume at 29 units when home.   Discontinued farxiga due to frequent UTI.   Febuxostat 40 -->80mg due to uric acid 6.2.   Diclofenac added for continued wrist pain.         Summary of nursing facility stay:   1. Type 2 diabetes mellitus with stage 3 chronic kidney disease, with long-term current use of insulin, unspecified whether stage 3a or 3b CKD (H)    2. Confusion    3. UTI (urinary tract infection)    4. Hyperuricemia    5. Arthralgia of right wrist       PMH of evan on cpap, htn, hld, thyroid cancer s/p thyroidectomy, DM2, CKD3/4, gout, depression/anxiety, lymphedema and weakness who presented to ED after being called to her home for lift assistance and weakness. In the hospital she was found to have encephalopathy slowly improving, FTT and weakness, and probably R wrist gout vs cellulitis. Finished cephalexin and now on febuxostat as she did not tolerate allopurinol.     In TCU she continued with R wrist pain and swelling, ace warp added and diclofenac for pain. She had confusion and increased weakness and UTI + and completed cipro. Farxiga discontinued due to frequent UTIs. Repeat uric acid 6.2 and febuxostat doubled to 80.   Today she has a concern of \"bump\" on the R wrist, just proximal to ulnar syloid process. She has some wrist pain with flexion, but allows palpation of the bump without pain. No redness or swelling. There is bruising over the area and injection tim likely from when this area was aspirated during hospital stay. Per nursing and patient, the bump is new and has not been there. It is rough, does not feel like a cyst although cannot rule this out. Spoke with Odalis on the phone and offered x-ray for further evaluation which she has declined at this time as the area is not causing worsening of function. She did have staff give Mariel a dose of colchicine for suspected recurrence of gout. " Mariel is now planning to discharge home on Thursday. Also discussed BG trends and necessary follow up. She will bring Mariel to her PCP in the next week or so. She plans to reduce glargine back to previous dose once she is home as she had higher than typical BG in the TCU, likely due to diet. BG now ranging 170-295 after discontinuing farxiga.       UPDATE SINCE DISCHARGE:  Mariel feels she is improving and returning to her baseline.  Odalis agrees.  Bump on wrist seems to be getting smaller and less bruised.  Mariel has had a previous gout attack in her foot - many years ago.  Home Care nurse was out yesterday, checked the wrist and will monitor.  She is getting home care through [facility] and has used them previously.  Her home care nurse is Krissy.  Needs refill of febuxostat 80 mg tabs.  Increased dose from 40 to 80 mg about 2 weeks ago.  She has resumed her home insulin regimen of 26 units Lantus in the morning.  She is holding the repaglinide.    Review of Systems         Objective           Vitals:  No vitals were obtained today due to virtual visit.    Physical Exam   GENERAL: Alert and no distress  EYES: Eyes grossly normal to inspection.    RESP: No audible wheeze, cough, or visible cyanosis.  No visible retractions or increased work of breathing.    SKIN: Visible skin clear. No significant rash, abnormal pigmentation or lesions.  MS: small lump on dorsal aspect of right wrist with greenish-yellow ecchymosis  NEURO/PSYCH: Mariel responds to direct questions.    Hemoglobin A1C   Date Value Ref Range Status   08/21/2023 7.6 (H) <5.7 % Final     Comment:     Normal <5.7%   Prediabetes 5.7-6.4%    Diabetes 6.5% or higher     Note: Adopted from ADA consensus guidelines.   04/29/2021 6.8 (H) 0 - 5.6 % Final     Comment:     Normal <5.7% Prediabetes 5.7-6.4%  Diabetes 6.5% or higher - adopted from ADA   consensus guidelines.        Uric Acid   Date Value Ref Range Status   09/26/2023 6.2 (H) 2.4 - 5.7 mg/dL Final    09/07/2023 10.0 (H) 2.4 - 5.7 mg/dL Final   08/28/2018 6.8 (H) 2.6 - 6.0 mg/dL Final   07/27/2018 7.8 (H) 2.6 - 6.0 mg/dL Final   05/07/2013 6.0 2.5 - 7.5 mg/dL Final                Video-Visit Details    Type of service:  Video Visit   Video Start Time: 12:27 PM  Video End Time:12:45 PM    Originating Location (pt. Location): Home    Distant Location (provider location):  Off-site  Platform used for Video Visit: Guy

## 2023-10-10 NOTE — PROGRESS NOTES
10/10- Called Mariel to check in and see how she's been feeling. I spoke to her roommate Odalis who is involved w/Mariel's healthcare. Mariel is at home now after a long hospital and rehab stay. See Chart for details. She has a nurse coming in and doing home cares. She stated her bp's have been in the 140's and requested that she keep a log so I can check in for result's. She has SOB and LE swelling at baseline. Here swelling is much improved. She's been eating and drinking well. She knows she needs to call and schedule an appt w/nephrology. Neph tracking and provider updated.  Bharat العلي RN  Nephrology care coordinator  [facility]

## 2023-10-11 NOTE — TELEPHONE ENCOUNTER
Mariel was just discharged from TCU to home and has home care through [facility] (nurse Krissy).    Please ask them to draw her for a uric acid level in approximately 2 weeks.    Rafaela Maxwell MD  [facility]

## 2023-10-11 NOTE — TELEPHONE ENCOUNTER
Called [facility] and asked to speak with Krissy whose direct number is [phone] with confidential voice mail.    Verbal order for uric acid in two weeks given.    Chloe RETANA RN  [facility]

## 2023-10-11 NOTE — PATIENT INSTRUCTIONS
To prevent gout attacks you will need to limit the purine content in your diet.  Purines increase the uric acid level in the blood which can cause acute gout attacks (and also kidney stones).      Plan your meals and snacks around foods that are low in purines and are safe for you to eat. These foods include:    Green vegetables and tomatoes.    Fruits.    Whole-grain breads, rice, and cereals.    Eggs, peanut butter, and nuts.    Low-fat milk, cheese, and other milk products.    Popcorn.    Gelatin desserts, chocolate, cocoa, and cakes and sweets, in small amounts.      You can eat certain foods that are medium-high in purines, but eat them only once in a while. These foods include:    Legumes, such as dried beans and dried peas. You can have 1 cup cooked legumes each day.    Asparagus, cauliflower, spinach, mushrooms, and green peas.    Fish and seafood (other than very high-purine seafood).    Oatmeal, wheat bran, and wheat germ.    Limit very high-purine foods, including:    Organ meats, such as liver, kidneys, sweetbreads, and brains.    Meats, including pittman, beef, pork, and lamb.    Game meats and any other meats in large amounts.    Anchovies, sardines, herring, mackerel, and scallops.    Gravy.    Beer.

## 2023-10-12 NOTE — TELEPHONE ENCOUNTER
Please advise if home care orders may be given?      Home Care is calling regarding an established patient with [facility].        Requesting orders from: Malika Wolf  Provider is following patient:yes  Is this a 60-day recertification request?  no     Orders Requested     Occupational Therapy  2x weekly for 4 weeks     Confirmed ok to leave a detailed message with call back.  Contact information confirmed and updated as needed.    Nina Patel, RN, BSN, PHN  [facility]  [phone]

## 2023-10-12 NOTE — TELEPHONE ENCOUNTER
Writer called and left message on Shelia's (OT) voicemail. Verbal orders given for this patient.   Orders Requested     Occupational Therapy  2x weekly for 4 weeks    PILAR Escobar RN  [facility]

## 2023-10-12 NOTE — TELEPHONE ENCOUNTER
Please advise if home care orders may be given?        Care facility is calling regarding an established patient with [facility].        Requesting orders from: Dr. Ramires  Provider is following patient: yes  Is this a 60-day recertification request?  no     Orders Requested     Physical Therapy    Frequency/duration: 2 x weekly for 4 weeks to work on conditioning, balance, lower extremity edema and safety w/transfer     Confirmed ok to leave a detailed message with call back.  Contact information confirmed and updated as needed.    Nina Patel, RN, BSN, PHN  [facility]  [phone]

## 2023-10-12 NOTE — TELEPHONE ENCOUNTER
Writer called and left message on Donal's (PT) voicemail. Verbal orders given from Dr. Cao.    Orders Requested     Physical Therapy     Frequency/duration: 2 x weekly for 4 weeks to work on conditioning, balance, lower extremity edema and safety w/transfer      SONIA EscobarN RN  [facility]

## 2023-10-12 NOTE — TELEPHONE ENCOUNTER
Forms/Letter Request    Type of form/letter:  Home Health Certification     Have you been seen for this request: N/A    Do we have the form/letter: Yes: Placed in care team 2    Who is the form from? Home care    Where did/will the form come from? form was faxed in    When is form/letter needed by: as soon as able    How would you like the form/letter returned: Fax : [phone]

## 2023-10-12 NOTE — PROGRESS NOTES
[facility] - CardioMEMS Reading Review    October 12, 2023   CardioMEMS reviewed.  Current diuretic: Bumex 3 mg daily  Recent plan of care changes: recently discharged after stay at rehab. Farxiga discontinued, discharged on Bumex 3 mg daily.     Current Threshold parameters:         Today's Waveform:       Readings:               Rosina Mays RN

## 2023-10-17 NOTE — TELEPHONE ENCOUNTER
Forms/Letter Request    Type of form/letter: / 85495, 89238,53611 treatment/order update/change in condition    Have you been seen for this request: N/A    Do we have the form/letter: Yes: placed in care team 2 providers sign folder    Who is the form from? Home care    Where did/will the form come from? form was faxed in    When is form/letter needed by: none given    How would you like the form/letter returned: Fax : [phone]

## 2023-10-19 NOTE — PROGRESS NOTES
[facility] - CardioMEMS Reading Review    October 19, 2023   CardioMEMS reviewed.  Current diuretic: Bumex 3 mg daily  Recent plan of care changes: none. PAD in goal range.     Current Threshold parameters:         Today's Waveform:       Readings:               Rosina Mays RN

## 2023-10-25 NOTE — LETTER
[facility]  Patient Centered Plan of Care  About Me:        Patient Name:  Mariel Ribeiro    YOB: 1945  Age:         78 year old   Destiney MRN:    [id number] Telephone Information:  Home Phone [phone]   Mobile [phone]       Address:  [address] [place] [address] Email address:  [email or web address]      Emergency Contact(s)    Name Relationship Lgl Grd Work Phone Home Phone Mobile Phone   1. KOSTAS PIERSON Roommate No [phone]   2. VITA ODOM Sister No  [phone]   3. ORTIZ,SAKSHI Friend No  [phone]           Primary language:  English     needed? No   Destiney Language Services:  [phone] op. 1  Other communication barriers:Physical impairment    Preferred Method of Communication:  Fabian  Current living arrangement: I live in a private home (Lives with Friend)    Mobility Status/ Medical Equipment: Dependent/Assisted by Another        Health Maintenance  Health Maintenance Reviewed: Due/Overdue   Health Maintenance Due   Topic Date Due    HEPATITIS B IMMUNIZATION (1 of 3 - Risk 3-dose series) Never done    RSV VACCINE 60+ (1 - 1-dose 60+ series) Never done    HF ACTION PLAN  11/30/2021    EYE EXAM  06/10/2022    DEXA  07/22/2023    INFLUENZA VACCINE (1) 09/01/2023    COVID-19 Vaccine (6 - 2023-24 season) 09/01/2023    DIABETIC FOOT EXAM  09/26/2023    LIPID  10/19/2023    MICROALBUMIN  11/21/2023           My Access Plan  Medical Emergency 911   Primary Clinic Line [facility] - [phone]   24 Hour Appointment Line [phone] or  [phone]) (toll-free)   24 Hour Nurse Line [phone] (toll-free)   Preferred Urgent Care [facility], [phone]     Preferred [facility]  [phone]     Preferred Pharmacy [id number] -  [place], [place] - [address] AT [facility] [address]     Behavioral Health Crisis Line The National Suicide Prevention Lifeline at 1-190.173.7986 or Text/Call 988           My Care Team Members  Patient Care Team         Relationship Specialty Notifications Start End    Malika Wolf APRN CNP PCP - General Nurse Practitioner Primary Care  9/5/23     Phone: [phone] Fax: [phone] [address] [place] [address]    Zeeshan Hutson MD MD Orthopedics  9/8/14      XXX RETIRED  [place] [address]    Jefry Hanson DPM  Podiatry  9/8/14     Phone: [phone] Fax: [phone]         [address] [facility] [address]    Tom Cruz MD MD Neurology  5/12/15     Phone: [phone] Fax: [phone]         [address] [facility] [facility] [address]    Rosina Kohler MD MD Family Medicine - Sports Medicine  11/21/16     Phone: [phone] Fax: [phone]         [address] [facility] [address]    Jeffrey Roberson MD MD General Surgery  5/22/17     Phone: [phone] Fax: [phone]         [address] [facility] [address] [facility] [address]    Stephanie Wolf MD MD Cardiology  7/2/18     Phone: [phone] Fax: [phone]         [address] [facility] [address] [facility] [address]    Cathi Vaughn APRN CNP Nurse Practitioner Cardiology Admissions 8/24/18     [facility]    Pager: [phone]         Rosina Mays RN Nurse Coordinator Cardiology Admissions 8/24/18     [facility]    Phone: [phone] Fax: [phone]        Shayna Newell, RN Specialty Care Coordinator Cardiology Admissions 3/13/19     Phone: [phone]         Magdalena Hall PA-C Physician Assistant Cardiology Admissions 12/10/19     [facility]    Phone: [phone] Fax: [phone]         [address] [facility] [address]    Opal Weber MD MD Breast Oncology Admissions 8/23/20      Phone: [phone] Fax: [phone]         [address] [place] [address]    Dahlia Wolfe PA-C Physician Assistant Urology  12/22/20     Phone: [phone] Fax: [phone] [address] [facility] [address]    Radha Llanos, RN Diabetes Educator Diabetes Education  1/8/21     Phone: [phone] Fax: [phone]         [address] [place] [address]    Muriel Will PA-C Physician Assistant Endocrinology, Diabetes, and Metabolism  2/10/21     Phone: [phone] Fax: [phone]         [address] [place] [address]    Muriel Will PA-C Assigned Endocrinology Provider   3/17/21     Phone: [phone] Fax: [phone]         [address] [place] [address]    Dahlia Wolfe PA-C Assigned OBGYN Provider   11/21/21     Phone: [phone] Fax: [phone] [address] [facility] [address] [facility] 500 [facility] [address]    Malika Wolf APRN CNP Assigned PCP   3/27/22     Phone: [phone] Fax: [phone]         [facility] [address] [place] [address]    Shreyas Cole, PhD Assigned Behavioral Health Provider   4/17/22     Phone: [phone] Fax: [phone]         [address] [place] [address]    Paula Hdz APRN CNP Nurse Practitioner Neurology  6/7/22     Phone: [phone] Fax: [phone]         [address] [address]    Bharat Fitzgerald DPM Assigned Musculoskeletal Provider   7/9/22     Phone: [phone] Fax: [phone]         [address] [facility] [address]    Allison Prather MD Assigned Pulmonology Provider   7/30/22     Phone: [phone] Fax: [phone]         [address] [place] [address]    Gwendolyn Mobley PAOfeC Assigned Nephrology Provider   9/24/22     Phone: [phone] Fax: [phone]         [address] [facility] [address]    Ronny Raines MD Assigned Palliative Care Provider   9/28/22     Phone:  [phone] Fax: [phone]         [address] [facility] [address]iative Care [facility] [address]    Ko Denney DO Assigned Neuroscience Provider   11/26/22     Phone: [phone] Fax: [phone]         [address] [place] [address]    Aurora Sparrow, PharmD Pharmacist Pharmacist  12/2/22     Phone: [phone] Fax: [phone]         [address] [place] [address]    Aurora Sparrow, PharmD Assigned MTM Pharmacist   12/10/22     Phone: [phone] Fax: [phone]         [address] [place] [address]    Bharat Jha, RN, RN Specialty Care Coordinator Nephrology All results, Admissions 12/21/22     Rea Dominguez MD Assigned Heart and Vascular Provider   6/10/23     Phone: [phone] Fax: [phone]         [address] [facility] 284 [facility] [address]    Camille Daigle, RN Lead Care Coordinator  Admissions 9/12/23     Joellen Choe PA-C Assigned Cancer Care Provider   9/30/23     Phone: [phone] Fax: [phone]         [address] [place] [address] [address] [facility] [address]                My Care Plans  Self Management and Treatment Plan    Care Plan  Care Plan: Social Support       Problem: Inadequate social support       Goal: Improve social support system       Start Date: 10/25/2023    This Visit's Progress: 10%    Priority: Medium    Note:     Barriers: Limited mobility; diagnoses of multiple, chronic, complex medical conditions    Strengths: Motivated; agreeable to Care Coordination; good friend support.    Patient expressed understanding of goal: Yes  Action steps to achieve this goal:-Sent via My Chart and email  1. I will review and research resources provided for Day programs, visiting companions.  Neighbor to Neighbor companions    Phone:[phone] email:[email or web address]  Website:  [email or web address]    Care.com-Provides  individualized listings and rates.    Day programs    [facility] [place] [phone]    [email or web address]    [employer] [phone]    [facility]  [phone]    For more options you can Try   [facility] Line  [phone]    2. I will contact Care Coordinator for additional resources if resources provided do not work for my lifestyle/situation.    3. I will contact my care team with questions, concerns or support needs. I will use the clinic as a resource and I understand I can contact my clinic with 24/7 after hours services available. Care Coordinator will remain available as needed.                             Care Plan: Home modification to promote Mobility/social interaction       Problem: HP GENERAL PROBLEM       Goal: General Goal - please update text       Start Date: 10/25/2023    This Visit's Progress: 10%    Priority: Medium    Note:     Barriers: Limited mobility; diagnoses of multiple, chronic, complex medical conditions    Strengths: Motivated; agreeable to Care Coordination; Good friend support     Patient expressed understanding of goal: Yes  Action steps to achieve this goal:  1. I will review and research resources provided for assistance with [employer]. Sent via e-mail [email or web address] and [facility] Independent living    Email:[email or web address]    [email or web address]    Voice: [phone]    2. I will contact Care Coordinator for additional resources if resources provided do not work for my lifestyle/situation.    3. I will contact my care team with questions, concerns or support needs. I will use the clinic as a resource and I understand I can contact my clinic with 24/7 after hours services available. Care Coordinator will remain available as needed.                               Action Plans on File:            Depression          Advance Care Plans/Directives:   Advanced  Care Plan/Directives on file:   Yes    Status of Document(s):   On File and Validated    Advanced Care Plan/Directives Type:   Advanced Directive - On File           My Medical and Care Information  Problem List   Patient Active Problem List   Diagnosis    Hypothyroidism    Diverticulitis of colon    Coronary atherosclerosis    Myalgia and myositis    Migraine    Chronic airway obstruction/ COPD    Mononeuritis    Obstructive sleep apnea    Hypertension goal BP (blood pressure) < 130/80    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24)    Hyperlipidemia with target LDL less than 70    Chronic diastolic heart failure (H)    Osteoarthritis    Morbid obesity (H)    Arthritis of knee    Transient cerebral ischemia    History of thyroid cancer    Cervicalgia    Fatty liver disease, nonalcoholic    Hepatomegaly    S/P CABG x 3    Type 2 diabetes mellitus with stage 3 chronic kidney disease, with long-term current use of insulin (H)    Lymphedema    Lower back pain    Bilateral carotid artery disease (H24)    Spinal stenosis of lumbar region, unspecified whether neurogenic claudication present    Status post coronary angiogram    Hemoptysis    Intertrigo    Malignant neoplasm of lower-inner quadrant of left breast in female, estrogen receptor positive (H)    Pain in the coccyx    Facet arthropathy    CKD (chronic kidney disease) stage 4, GFR 15-29 ml/min (H)    Facet arthropathy, lumbar    Temporal pain    Elevated C-reactive protein    Hypertensive chronic kidney disease with stage 5 chronic kidney disease or end stage renal disease (H)    Generalized weakness    Recurrent falls    Ambulatory dysfunction    Cellulitis of right hand excluding fingers and thumb    Carotid artery occlusion      Current Medications and Allergies:  See printed Medication Report.    Care Coordination Start Date: 9/12/2023   Frequency of Care Coordination: monthly, more frequently as needed     Form Last Updated: 10/25/2023

## 2023-10-25 NOTE — PROGRESS NOTES
Clinic Care Coordination Contact  Clinic Care Coordination Contact  OUTREACH    Referral Information:  Referral Source: IP Handoff    Primary Diagnosis: Diabetes    Chief Complaint   Patient presents with    Clinic Care Coordination - Initial        Universal Utilization: 84.2% ER or hospitalization risk  Clinic Utilization  Difficulty keeping appointments:: No  Compliance Concerns: No  No-Show Concerns: No  No PCP office visit in Past Year: No  Utilization      No Show Count (past year)  1             ED Visits  1             Hospital Admissions  1                    Current as of: 10/23/2023 11:12 AM                Clinical Concerns:  Current Medical Concerns:  Limited mobility, falls, social support.    Current Behavioral Concerns: none.    Education Provided to patient: Educated on Care Coordination-enrolled. Educated on ramp services. Educated on day programs, visiting  options.   Pain  Pain (GOAL)::  (n/a)  Health Maintenance Reviewed: Due/Overdue   Health Maintenance Due   Topic Date Due    HEPATITIS B IMMUNIZATION (1 of 3 - Risk 3-dose series) Never done    RSV VACCINE 60+ (1 - 1-dose 60+ series) Never done    HF ACTION PLAN  11/30/2021    EYE EXAM  06/10/2022    DEXA  07/22/2023    INFLUENZA VACCINE (1) 09/01/2023    COVID-19 Vaccine (6 - 2023-24 season) 09/01/2023    DIABETIC FOOT EXAM  09/26/2023    LIPID  10/19/2023    MICROALBUMIN  11/21/2023         Medication Management:  Medication review status: Medications reviewed and no changes reported per patient.          Current Outpatient Medications:     acetaminophen (TYLENOL) 500 MG tablet, Take 1,000 mg by mouth every 6 hours as needed for mild pain, Disp: , Rfl:     anastrozole (ARIMIDEX) 1 MG tablet, Take 1 tablet (1 mg) by mouth daily, Disp: 90 tablet, Rfl: 3    aspirin (ASA) 81 MG EC tablet, Take 1 tablet (81 mg) by mouth daily, Disp: , Rfl:     atorvastatin (LIPITOR) 40 MG tablet, TAKE 1 TABLET(40 MG) BY MOUTH IN THE MORNING, Disp: 90  tablet, Rfl: 1    bumetanide (BUMEX) 1 MG tablet, Take 3 tablets (3 mg) by mouth daily, Disp: 270 tablet, Rfl: 3    colchicine (COLCRYS) 0.6 MG tablet, Take 1 tablet (0.6 mg) by mouth daily as needed for gout pain, Disp: 30 tablet, Rfl: 0    Continuous Blood Gluc  (FREESTYLE MARTY 14 DAY READER) JEZ, 1 Units 4 times daily (before meals and nightly), Disp: 1 Device, Rfl: 0    Continuous Blood Gluc Sensor (FREESTYLE MARTY 14 DAY SENSOR) [facility], PLACE NEW SENSOR TO BACK OF ARM EVERY 14 DAYS TO MONITOR BLOOD SUGARS, Disp: 6 each, Rfl: 4    diclofenac (VOLTAREN) 1 % topical gel, Apply 2 g topically 3 times daily To right wrist, Disp: , Rfl:     EPINEPHrine (ANY BX GENERIC EQUIV) 0.3 MG/0.3ML injection 2-pack, Inject 0.3 mLs (0.3 mg) into the muscle as needed for anaphylaxis May repeat one time in 5-15 minutes if response to initial dose is inadequate., Disp: 2 each, Rfl: 1    febuxostat (ULORIC) 80 MG TABS tablet, Take 1 tablet (80 mg) by mouth daily, Disp: 90 tablet, Rfl: 0    febuxostat (ULORIC) 80 MG TABS tablet, Take 80 mg by mouth daily, Disp: , Rfl:     ferrous gluconate (FERGON) 324 (38 Fe) MG tablet, TAKE 1 TABLET BY MOUTH EVERY MONDAY, WEDNESDAY, AND FRIDAY MORNING., Disp: 36 tablet, Rfl: 3    insulin glargine (LANTUS SOLOSTAR) 100 UNIT/ML pen, Inject 26 Units Subcutaneous every morning Increased from 29 units to 32 units on ., Disp: , Rfl:     losartan (COZAAR) 25 MG tablet, Take 1 tablet (25 mg) by mouth daily, Disp: 90 tablet, Rfl: 3    metoprolol succinate ER (TOPROL XL) 25 MG 24 hr tablet, Take 0.5 tablets (12.5 mg) by mouth every morning AND 1 tablet (25 mg) every evening., Disp: 135 tablet, Rfl: 3    miconazole (MICATIN/MICRO GUARD) 2 % external powder, Apply topically as needed for itching or other Redness in bilateral groin and  clef, Disp: 43 g, Rfl: 0    nitroGLYcerin (NITROSTAT) 0.4 MG sublingual tablet, For chest pain place 1 tablet under the tongue every 5 minutes for 3 doses. If  symptoms persist 5 minutes after 1st dose call 911., Disp: 25 tablet, Rfl: 1    nystatin (MYCOSTATIN) 225932 UNIT/GM external ointment, Apply topically 2 times daily Apply to external vagina 2 times daily., Disp: 30 g, Rfl: 3    nystatin POWD, 1 Dose 4 times daily (Patient taking differently: Apply topically 4 times daily), Disp: 1 each, Rfl: 1    potassium chloride ER (KLOR-CON M) 10 MEQ CR tablet, Take 2 tablets (20 mEq) by mouth 2 times daily, Disp: 360 tablet, Rfl: 3    pregabalin (LYRICA) 100 MG capsule, Take 1 capsule (100 mg) by mouth 2 times daily, Disp: 60 capsule, Rfl: 1    PROAIR  (90 Base) MCG/ACT inhaler, INHALE 2 PUFFS INTO THE LUNGS EVERY 6 HOURS, Disp: 25.5 g, Rfl: 9    SYNTHROID 150 MCG tablet, TAKE 1 TABLET(150 MCG) BY MOUTH DAILY, Disp: 90 tablet, Rfl: 3    tolterodine ER (DETROL LA) 2 MG 24 hr capsule, TAKE 2 CAPSULES BY MOUTH ONCE DAILY., Disp: 180 capsule, Rfl: 3    venlafaxine (EFFEXOR XR) 75 MG 24 hr capsule, Take 1 capsule (75 mg) by mouth daily, Disp: 90 capsule, Rfl: 1  No current facility-administered medications for this visit.    Facility-Administered Medications Ordered in Other Visits:     sodium chloride (PF) 0.9% PF flush 10 mL, 10 mL, Intracatheter, Once, Starla Saez, NP     Allergies   Allergen Reactions    Contrast Dye Anaphylaxis    Fish Allergy Anaphylaxis    Iodine Anaphylaxis    Oxycodone Other (See Comments)     Severe suicidal tendencies on this medication    Tree Nuts [Nuts] Anaphylaxis     Tree nuts only (peanuts ok)    Bactrim      Increased uric acid    Betadine [Povidone Iodine] Hives, Swelling and Difficulty breathing     Betadine    Combivent      Rash    Dulaglutide Other (See Comments)     Hx . Of thyroid cancer.    Fish Oil     Lisinopril Other (See Comments)     Scr/grf severely reduced.     Penicillins Rash    Allopurinol Rash    Latex Rash    Wool Fiber Rash          Functional Status:  Dependent ADLs:: Ambulation-walker, Bathing, Dressing,  Grooming, Incontinence, Transfers, Toileting  Dependent IADLs:: Cleaning, Cooking, Laundry, Shopping, Meal Preparation, Medication Management, Money Management, Transportation, Incontinence  Bed or wheelchair confined:: No  Mobility Status: Dependent/Assisted by Another  Fallen 2 or more times in the past year?: Yes  Any fall with injury in the past year?: Yes    Living Situation:  Current living arrangement:: I live in a private home (Lives with Friend)  Type of residence:: Private home - stairs    Lifestyle & Psychosocial Needs:    Social Determinants of Health     Food Insecurity: Low Risk  (10/11/2023)    Food Insecurity     Within the past 12 months, did you worry that your food would run out before you got money to buy more?: No     Within the past 12 months, did the food you bought just not last and you didn t have money to get more?: No   Depression: Not at risk (10/11/2023)    PHQ-2     PHQ-2 Score: 2   Housing Stability: Low Risk  (10/11/2023)    Housing Stability     Do you have housing? : Yes     Are you worried about losing your housing?: No   Tobacco Use: Medium Risk (9/5/2023)    Patient History     Smoking Tobacco Use: Former     Smokeless Tobacco Use: Never     Passive Exposure: Not on file   Financial Resource Strain: Low Risk  (10/11/2023)    Financial Resource Strain     Within the past 12 months, have you or your family members you live with been unable to get utilities (heat, electricity) when it was really needed?: No   Alcohol Use: Not on file   Transportation Needs: Low Risk  (10/11/2023)    Transportation Needs     Within the past 12 months, has lack of transportation kept you from medical appointments, getting your medicines, non-medical meetings or appointments, work, or from getting things that you need?: No   Physical Activity: Not on file   Interpersonal Safety: Not on file   Stress: Not on file   Social Connections: Not on file     Diet:: Diabetic diet  Inadequate nutrition (GOAL)::  No  Tube Feeding: No  Inadequate activity/exercise (GOAL):: No  Significant changes in sleep pattern (GOAL): No  Transportation means:: Regular car, Friend     Quaker or spiritual beliefs that impact treatment::  (n/a)  Mental health DX:: Yes  Mental health DX how managed:: Medication  Mental health management concern (GOAL):: No  Chemical Dependency Status: No Current Concerns  Informal Support system:: Friends, Family.     Resources and Interventions:  Current Resources:   Skilled Home Care Services: Skilled Nursing, Home Health Aid, Physical Therapy, Speech Therapy  Community Resources: Home Care  Supplies Currently Used at Home: Incontinence Supplies, Diabetic Supplies  Equipment Currently Used at Home: cane, straight, shower chair, walker, rolling, glucometer  Employment Status: retired   Advance Care Plan/Directive  Advanced Care Plans/Directives on file:: Yes  Status of record:: On File and Validated    Referrals Placed: Other , Day Care, Senior Exercise, Senior Linkage Line, Community Resources (Ramp info, day programs, visiting companionship)    The patient consented via Verbal consent to have contact information and resources sent via email in an unencrypted manner.     Care Plan:  Care Plan: Social Support       Problem: Inadequate social support       Goal: Improve social support system       Start Date: 10/25/2023    This Visit's Progress: 10%    Priority: Medium    Note:     Barriers: Limited mobility; diagnoses of multiple, chronic, complex medical conditions    Strengths: Motivated; agreeable to Care Coordination; good friend support.    Patient expressed understanding of goal: Yes  Action steps to achieve this goal:-Sent via My Chart and email  1. I will review and research resources provided for Day programs, visiting companions.  Neighbor to Neighbor companions    Phone:[phone]  email:[email or web address]  Website:  [email or web address]    Care.com-Provides individualized listings and rates.    Day programs    [facility] [place] [phone]    [email or web address]    [facility] [phone]    [facility]  [phone]    For more options you can Try   [facility] Line  [phone]    2. I will contact Care Coordinator for additional resources if resources provided do not work for my lifestyle/situation.    3. I will contact my care team with questions, concerns or support needs. I will use the clinic as a resource and I understand I can contact my clinic with 24/7 after hours services available. Care Coordinator will remain available as needed.                             Care Plan: Home modification to promote Mobility/social interaction       Problem: HP GENERAL PROBLEM       Goal: General Goal - please update text       Start Date: 10/25/2023    This Visit's Progress: 10%    Priority: Medium    Note:     Barriers: Limited mobility; diagnoses of multiple, chronic, complex medical conditions    Strengths: Motivated; agreeable to Care Coordination; Good friend support     Patient expressed understanding of goal: Yes  Action steps to achieve this goal:  1. I will review and research resources provided for assistance with Ramp install. Sent via e-mail [email or web address] and [facility] Independent living    Email:[email or web address]    [email or web address]    Voice: [phone]    2. I will contact Care Coordinator for additional resources if resources provided do not work for my lifestyle/situation.    3. I will contact my care team with questions, concerns or support needs. I will use the clinic as a resource and I understand I can contact my clinic with 24/7 after hours services available. Care Coordinator will  remain available as needed.                               Patient/Caregiver understanding: Patient/caregiver verbalized understanding and denies any additional questions or concerns at this time. RNCC engaged in [employer]ID[employer] communications during encounter.       Outreach Frequency: monthly, more frequently as needed  Future Appointments                In 1 month [facility]    In 1 month Austin Miguel APRN CNP [facility]    In 7 months [facility] Weber MD [facility]            Plan: Patient/caregiver will call RNCC with questions, concerns, support needs. RNCC will be available as needed. Next outreach 1 month.     SONIA ZapataN, RN, PHN   Care Coordinator-Ambulatory Care Management  [facility] and [facility]  [phone]

## 2023-10-25 NOTE — LETTER
[facility] CARE COORDINATION  [address]  [place] [address]    October 25, 2023    Mariel Ribeiro  [address] [place] [address]      Dear Mariel and Odalis,    I am a clinic care coordinator who works with CHANTAL Grace CNP with the [facility]. I wanted to thank you for spending the time to talk with me.  Below is a description of clinic care coordination and how I can further assist you.       The clinic care coordination team is made up of a registered nurse, , financial resource worker and community health worker who understand the health care system. The goal of clinic care coordination is to help you manage your health and improve access to the health care system. Our team works alongside your provider to assist you in determining your health and social needs. We can help you obtain health care and community resources, providing you with necessary information and education. We can work with you through any barriers and develop a care plan that helps coordinate and strengthen the communication between you and your care team.  Our services are voluntary and are offered without charge to you personally.    Please feel free to contact me with any questions or concerns regarding care coordination and what we can offer.      We are focused on providing you with the highest-quality healthcare experience possible.    -Resources for [facility] for Independent living     Email:[email or web address]     [email or web address]     Voice: [phone]    -Resources for adult day programs or visiting companions    Neighbor to Neighbor companions    Phone:[phone] email:[email or web address]  Website:  [email or web address]-[email or web address]    Care.com-Provides individualized listings and rates.    -Day programs    [facility] [place]  [phone]    [email or web address]    [facility] [phone]    [facility]  [phone]    -For more options you can Try   [facility] Line  [phone]    Sincerely,     SONIA ZapataN, RN, PHN   Care Coordinator-Ambulatory Care Management  [facility]  [phone]      Enclosed: I have enclosed a copy of a 24 Hour Access Plan. This has helpful phone numbers for you to call when needed. Please keep this in an easy to access place to use as needed., I have enclosed a copy of the Patient Centered Plan of Care. This has helpful information and goals that we have talked about. Please keep this in an easy to access place to use as needed., and I have enclosed helpful educational material. Please review and call me with any questions.

## 2023-10-26 NOTE — PROGRESS NOTES
[facility] CORE Clinic - CardioMEMS Reading Review    October 26, 2023   CardioMEMS reviewed.  Current diuretic: Bumex 3 mg daily  Recent plan of care changes: none. Last hospitalization stopped Jardiance, increased Bumex back to 3 mg daily.     Current Threshold parameters:         Today's Waveform:       Readings:               Rosina Mays RN

## 2023-11-01 NOTE — TELEPHONE ENCOUNTER
----- Message from Debbi Alcantara RN sent at 11/1/2023 11:10 AM CDT -----  Regarding: CORE  Hey!     This patient is scheduled for CORE Enroll on 12/1 but has actually been seen by [facility] before (within the last 3 years), so could you please change this to a Return CORE appointment? 30 minutes?    Thanks,  JODY Werner

## 2023-11-02 NOTE — PROGRESS NOTES
[facility] CORE Clinic - CardioMEMS Reading Review    November 2, 2023   CardioMEMS reviewed.  Current diuretic: Bumex 3 mg daily  Recent plan of care changes:     Current Threshold parameters:         Today's Waveform:       Readings:               Rosina Mays RN

## 2023-11-03 NOTE — TELEPHONE ENCOUNTER
Home Care is calling regarding an established patient with [facility].       Requesting orders from: Malika Wolf  Provider is following patient: Yes  Is this a 60-day recertification request?  No    Orders Requested    Physical Therapy  Request for continuation of care with increase in frequency  Frequency:  2x/wk for 3 wks        Information was gathered and will be sent to provider for review.  RN will contact Home Care with information after provider review.  Confirmed ok to leave a detailed message with call back.  Contact information confirmed and updated as needed.    Odalis Phoenix RN

## 2023-11-06 NOTE — PROGRESS NOTES
Brief Social Work Progress Note    Information Obtained: Writer received 2 long Vms from pt's caregiver/roommate/HCA, Odalis Bonillakrista (P: [phone]). Odalis is very upset that pt has to pay out of pocket for TCU stay because pt was no IP. Writer provided emotional support - supportive listening, validation, and encouragement. Writer encouraged Odalis to contact Patient Relations (P: [phone]).    Follow-Up Plan: None     ________________    NAKITA Fernandes, [facility]  ED/Observation   [facility]  Phone: [phone]  Pager: [phone]  Fax: [phone]    On-call pager, [phone], 4:00pm to midnight

## 2023-11-09 NOTE — PROGRESS NOTES
[facility] CORE Clinic - CardioMEMS Reading Review    November 9, 2023   CardioMEMS reviewed.  Current diuretic: Bumex 3 mg daily  Recent plan of care changes: none. 1 reading sent this week and is within goal range.     Current Threshold parameters:         Today's Waveform:       Readings:               Rosina Mays RN

## 2023-11-13 NOTE — TELEPHONE ENCOUNTER
Routing refill request to provider for review/approval because:  Drug not on the [facility] refill protocol   Ting Andrade RN  [facility]

## 2023-11-14 NOTE — PROGRESS NOTES
CVTS Brief Note:    Patient presented for outpatient angio. Showed severe 3 vessel disease. Cardiology planning on discharging this PM. D/w Dr. Mason who would like to see patient in clinic. Scheduled for next Tuesday. Pre-op imaging to be completed prior to d/c. Our RN care coordinator will contact patient about clinic and labs to be completed prior.     Kb Jeffries PA-C  Cardiothoracic Surgery  June 26, 2018 3:54 PM   p: [phone]     HPI  Robert A Valentino is a 51 y.o. male follow-up narrow ear canals.  He has not had any complaints of itching, pain, drainage.  He has not required eardrops.  He is here for routine check.  He has narrow ear canals and has had inflammation and cerumen impaction in the past      Past Medical History:   Diagnosis Date    Epilepsy, unspecified, not intractable, without status epilepticus (CMS/HCC)     Epilepsy    Multiple sclerosis (CMS/HCC)     History of multiple sclerosis    Personal history of other diseases of the digestive system     History of gastroesophageal reflux (GERD)    Personal history of other diseases of the nervous system and sense organs     History of cerebral palsy    Personal history of other mental and behavioral disorders     History of intellectual disability    Spastic diplegic cerebral palsy (CMS/HCC)     Spastic diplegia    Unspecified convulsions (CMS/HCC)     Seizures            Medications:     Current Outpatient Medications:     acetaminophen--10 mg/10 mL liquid, every 4 hours., Disp: , Rfl:     baclofen (Lioresal) 20 mg tablet, Take 1 tablet (20 mg) by mouth 3 times a day., Disp: , Rfl:     lisinopril 10 mg tablet, Take 1 tablet (10 mg) by mouth once daily., Disp: , Rfl:     oxybutynin XL (Ditropan-XL) 15 mg 24 hr tablet, Take 1 tablet (15 mg) by mouth once daily., Disp: , Rfl:     polyethylene glycol 1450,bulk, powder, Polyethylene Glycol POWD  Refills: 0     Active, Disp: , Rfl:     venlafaxine XR (Effexor-XR) 150 mg 24 hr capsule, Take 1 capsule (150 mg) by mouth once daily in the morning. Take before meals., Disp: , Rfl:      Allergies:  Allergies   Allergen Reactions    Penicillins Unknown     Other reaction(s): Unknown   Tolerated Cefepime & Ceftriaxone in 2021    Tolerated Cefepime & Ceftriaxone in 2021        Physical Exam:  Last Recorded Vitals  There were no vitals taken for this visit.  General:     General appearance: Well-developed, well-nourished in no acute  distress.       Voice:  normal       Head/face: Normal appearance; nontender to palpation     Facial nerve function: Normal and symmetric bilaterally.    Oral/oropharynx:     Oral vestibule: Normal labial and gingival mucosa     Tongue/floor of mouth: Normal without lesion     Oropharynx: Clear.  No lesions present of the hard/soft palate, posterior pharynx    Neck:     Neck: Normal appearance, trachea midline     Salivary glands: Normal to palpation bilaterally     Lymph nodes: No cervical lymphadenopathy to palpation     Thyroid: No thyromegaly.  No palpable nodules     Range of motion: Normal    Neurological:     Cortical functions: Wheelchair, cooperative     Larynx/hypopharynx:     Laryngeal findings: Mirror exam inadequate or limited secondary to enlarged base of tongue and/or excessive gagging    Ear:     Ear canal: Narrow bilaterally.  No inflammation.  He had cerumen on the left which was cleaned with wire-loop and alligator.  Ciprodex was applied in the left for some residual inflammation but generally looks good     Tympanic membrane: Intact and mobile bilaterally     Pinna: Normal bilaterally     Hearing:  Gross hearing assessment normal by voice    Nose:     Visualized using: Anterior rhinoscopy     Nasopharynx: Inadequate mirror exam secondary to gag, anatomy.       Nasal dorsum: Nontraumatic midline appearance     Septum: Midline     Inferior turbinates: Normally sized     Mucosa: Bilateral, pink, normal appearing       Assessment/Plan   I recommended continued cerumen management and we will see him back in 6 months.  If he requires drops for otalgia or otorrhea, these can be prescribed but he seems to be doing well now.  Recheck sooner as needed         Adam Hernandez MD

## 2023-11-14 NOTE — TELEPHONE ENCOUNTER
Sy from [facility] is calling requesting verbal order for urine sample regarding bladder infection. Pt is complain of pain and burning with urination.      Call back Sy at [phone] for order. Okay to leave a detailed message.      Tahir Delacruz, BSN RN  [facility]

## 2023-11-15 NOTE — TELEPHONE ENCOUNTER
Writer called Cristian and relayed message per Dr. Greer.    Cristian verbalized understanding and stated he is no longer with patient but patient and caregiver declined Emergency Room evaluation when he was at patient's residence.    Cristian stated he would inform Odalis of Dr. Greer's recommendation.    SONIA RobertsN, RN-BC  [facility]

## 2023-11-15 NOTE — TELEPHONE ENCOUNTER
"PT Cristian from [facility] calling, wondering about UA results and if pt should  get rx or just go to ED    Per Cristian:    At her baseline she is a difficult transfer to start with, ,now it is worse, laying half in the bed and half out    Vitals now 149/74 BP and pulse 64. No fever.      She lives in her own home with help from caretaker friend Odalis 24/7. The caretaker can't transfer her anymore \"that is our feeling and my advice was to call 911. I don't think she is safe right now\"  .      She is changed from baseline with more difficulty transferring. Her responsiveness is worse \"she is pretty confused at baseline\"    Per caretaker Odalis:  She feels that there is nothing they can do for her at hospital that she cannot do. Pt does not want to go to the hospital. \"She is responding the way she has in the past with urinary tract infection, which is just kind of out of it\"     Should she be treated for UTI? Please see the UA from yesterday - culture pending. Pharmacy loaded    Candace Tello, RN, BSN  [facility]             Candace Tello, RN, BSN  [facility]       "

## 2023-11-16 NOTE — TELEPHONE ENCOUNTER
"Patient's roommate/caregiver Odalis calling in response to result message they received from Malika today:     \"Your results show you have a urinary tract infection.  Were you started on antibiotics?  If not- let me know and I will send some in.\"     Patient has not been started on abx and will need this sent in. Pharmacy confirmed. Allergy to bactrim & penicillin.     Sandra Sutton RN BSN  [facility]      "

## 2023-11-16 NOTE — PROGRESS NOTES
[facility] - CardioMEMS Reading Review    November 16, 2023   CardioMEMS reviewed.  Current diuretic: Bumex 3 mg daily  Recent plan of care changes: none.     Current Threshold parameters:         Today's Waveform:       Readings:               Rosina Mays RN

## 2023-11-20 NOTE — PROGRESS NOTES
Mariel Ribeiro has an upcoming lab appointment.        Future Appointments   Date Time Provider Department Center   11/28/2023  4:15 PM [facility]    12/1/2023  2:15 PM Austin Miguel APRN CNP [facility]   6/17/2024  2:30 PM Opal Weber MD [facility]       The appointment note says: labs for Lamont    There is no Lab order available.      Please review and place future orders as appropriate.  If no Lab order will be placed, please advise patient.      Also for consideration: [facility]MPO    Health Maintenance Due   Topic    ANNUAL REVIEW OF HM ORDERS     LIPID     MICROALBUMIN        Thanks,    Neelima Arvizu

## 2023-11-21 NOTE — TELEPHONE ENCOUNTER
JODY Daly [facility] calling    She has to get BMP next week on Tuesday or Wednesday , ordered by cardiology and will be drawn by homecare nurse    Her caregiver Odalis wants to know if you want other labwork done as it she will be poked and would be a good time if anything else needed.    Candace Tello RN, BSN  [facility]

## 2023-11-21 NOTE — TELEPHONE ENCOUNTER
I called [facility] and gave verbal orders for BMP. They will draw labs next week and fax results to us.    Debbi Alcantara RN

## 2023-11-21 NOTE — TELEPHONE ENCOUNTER
[facility]    Phone Message    May a detailed message be left on voicemail: yes     Reason for Call: Other: Dain calling regarding labs that  need to be done for patient. Dain needs a verbal order from from nursing. Please reach out.      Action Taken: Other: cardiology     Travel Screening: Not Applicable    Thank you!  [facility]

## 2023-11-22 NOTE — PROGRESS NOTES
[facility] - CardioMEMS Reading Review    November 22, 2023   CardioMEMS reviewed.  Current diuretic: Bumex 3 mg daily  Recent plan of care changes:     Current Threshold parameters:         Today's Waveform:       Readings:               Rosina Mays RN

## 2023-11-29 NOTE — TELEPHONE ENCOUNTER
Home Health Care    Reason for call:  Gwendolyn hayes/ [facility] OT reached out to give update.     Fell 11/27/23 early AM. Slid off edge of bed, Gwendolyn thinks patient was trying to sit up. Says no injuries reported, did not lose consciousness       Pt Provider: Malika BURNS    Phone Number Homecare Nurse can be reached at: [phone]     Can we leave a detailed message on this number? YES

## 2023-11-30 NOTE — TELEPHONE ENCOUNTER
Dora, RN with [facility] called back and writer reviewed labs ordered by JULIANA Wolf CNP, today.    Dora verbalized understanding and stated:  BMP was drawn and sent to Cardiology  RN would be out likely 12/5/23 or 12/6/23 to draw/collect A1C, lipid panel, TSH and urine albumin due to staffing      SONIA RobertsN, RN-[facility]

## 2023-11-30 NOTE — PROGRESS NOTES
[facility]:  CardioMems Routine Remote Evaluation     Dates: 10/26/23 through 11/24/23    Adjustments made in the last month: none. One elevated reading, came down without intervention    No adjustments made this month.  Discussed with patient/caregiver and they will continue current plan of care.     Threshold Alert Settings:        Readings:       Future Appointments   Date Time Provider Department Center   12/1/2023  2:15 PM Austin Miguel APRN [facility]   6/17/2024  2:30 PM Opal Weber MD [facility]

## 2023-11-30 NOTE — PROGRESS NOTES
[facility] Clinic - CardioMEMS Reading Review    November 30, 2023   CardioMEMS reviewed.  Current diuretic: Bumex 3 mg daily  Recent plan of care changes: none.     Current Threshold parameters:         Today's Waveform:       Readings:               Rosina Mays RN

## 2023-11-30 NOTE — TELEPHONE ENCOUNTER
Writer called and left message on Krissy VERA's voicemail from [facility] to call back and speak with a triage nurse.    In message informed Krissy VERA that additional labs have been ordered for patient by Malika Wolf.     SONIA EscobarN RN  [facility]
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
## 2023-12-01 NOTE — NURSING NOTE
No other vitals to report today, states they are taken by home health     PHQ declined     Is the patient currently in the state of [place]? YES    Visit mode:VIDEO    If the visit is dropped, the patient can be reconnected by: VIDEO VISIT: Text to cell phone:   Telephone Information:   Mobile [phone]       Will anyone else be joining the visit? Odalis   (If patient encounters technical issues they should call [phone])    How would you like to obtain your AVS? MyChart    Are changes needed to the allergy or medication list? No    Patient declined individual allergy and medication review by support staff because patient denies any changes since echeck-in completion and states all information entered during echeck-in remains accurate. Per Odalis Lowry     Reason for visit: SUZIE Coello MA VVF

## 2023-12-01 NOTE — NURSING NOTE
Labs: Patient was given results of the laboratory testing obtained today. Patient demonstrated understanding of this information and agreed to call with further questions or concerns.     Med Reconcile: Reviewed and verified all current medications with the patient. The updated medication list was printed and given to the patient.    Return Appointment: Patient given instructions regarding scheduling next clinic visit. Patient demonstrated understanding of this information and agreed to call with further questions or concerns. CORE in 12 months.     Patient stated she understood all health information given and agreed to call with further questions or concerns.    Debbi Alcantara RN

## 2023-12-01 NOTE — PATIENT INSTRUCTIONS
Take your medicines every day, as directed    Changes made today:  none     Monitor Your Weight and Symptoms    Contact us if you:    Gain 2 pounds in one day or 5 pounds in one week  Feel more short of breath  Notice more leg swelling  Feel lightheadeded   Change your lifestyle    Limit Salt or Sodium:  2000 mg  Limit Fluids:  2000 mL or approximately 64 ounces  Eat a Heart Healthy Diet  Low in saturated fats  Stay Active:  Aim to move at least 150 minutes every  week         To Contact us    During Business Hours:  [phone], option # 1      After hours, weekends or holidays:   [phone], Option #4  Ask to speak to the On-Call Cardiologist. Inform them you are a CORE/heart failure patient at the [facility].     Use QuicklyChat allows you to communicate directly with your heart team through secure messaging.  [employer] can be accessed any time on your phone, computer, or tablet.  If you need assistance, we'd be happy to help!         Keep your Heart Appointments:    CORE in 12 months or sooner if needed     Please consider attending our virtual support group which is held monthly. Please reach out to [employer] at [phone] for more information if you are interested in attending.       2023 dates:      - Monday, December 4th, 1-2pm
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
## 2023-12-01 NOTE — PROGRESS NOTES
Remote monitoring of Pulmonary Artery Pressures via CardioMEMS device reviewed at least weekly and as needed with CORE nursing. Diuretics adjusted accordingly.     billing dates of 10/26/23 through 11/24/23     Austin HOLTC

## 2023-12-01 NOTE — PROGRESS NOTES
Virtual Visit Details    Type of service:  Video Visit       Originating Location (pt. Location): Home    Distant Location (provider location):  On-site  Platform used for Video Visit: Guy

## 2023-12-04 NOTE — PROGRESS NOTES
Clinic Care Coordination Contact  [facility]/Voicemail    Clinical Data: Care Coordinator Outreach    Outreach Documentation Number of Outreach Attempt   12/4/2023   3:47 PM 1       CHW briefly spoke to patent's roommate, Odalis (C2C on file). Odalis states that now is not a good time to talk and took down CHW's phone number. Odalis states that she will reach out to CHW/    Plan: Care Coordinator will try to reach patient again in 10 business days.    ALEX Bailon, B.A. [facility] Care Coordination  [facility]:   [facility]  [phone]

## 2023-12-04 NOTE — TELEPHONE ENCOUNTER
Forms/Letter Request    Type of form/letter: cert/plan of care 12/5/2023 - 2/2/2024    Have you been seen for this request: N/A    Do we have the form/letter: Yes: placed in care team 2 providers sign folder    Who is the form from? Home care    Where did/will the form come from? form was faxed in    When is form/letter needed by: non given    How would you like the form/letter returned: Fax : [phone]

## 2023-12-05 NOTE — TELEPHONE ENCOUNTER
"Malika -- Pended.     \"Mariel's Lantus prescription somehow got dropped from the [facility]'s list in the transition from [facility] back to [facility]. I think [facility] sent you a request for a new prescription, but it is unclear whether you have provided it. If you have not provided [facility] a new Lantus prescription, can you send it now?\"    Monica Barakat RN  [facility]    "

## 2023-12-07 NOTE — PROGRESS NOTES
[facility] - CardioMEMS Reading Review    December 7, 2023   CardioMEMS reviewed.  Current diuretic: Bumex 3 mg daily  Recent plan of care changes: PAD within range this week.     Current Threshold parameters:         Today's Waveform:       Readings:               Rosina Mays RN

## 2023-12-07 NOTE — TELEPHONE ENCOUNTER
Home Health Care    Reason for call:  Verbal Orders     Orders are needed for this patient.  Skilled Nursing: Home health aid was out, has wound on LT heel RN would like to assess today     Pt Provider: Malika Wolf    Phone Number Homecare Nurse can be reached at: [phone]    Can we leave a detailed message on this number? YES

## 2023-12-07 NOTE — TELEPHONE ENCOUNTER
Yes, skilled nursing Ok tofday. Let me know if you need anything signed    Joao Santana PA-C       Called Krissy VERA case manager back, left VM approving of verbal order for skilled nursing today    PILAR Yousif RN  [facility]

## 2023-12-13 NOTE — PROGRESS NOTES
[facility] - CardioMEMS Reading Review    December 13, 2023   CardioMEMS reviewed.  Current diuretic: Bumex 3 mg daily  Recent plan of care changes:     Current Threshold parameters:         Today's Waveform:       Readings:               Rosina Mays RN

## 2023-12-15 NOTE — TELEPHONE ENCOUNTER
HOLD PCP    Message reviewed - this is either non urgent and okay to wait, or was addressed and holding for PCP review per PCP request.    Dr. Tricia Hdz MD / [facility]

## 2023-12-15 NOTE — TELEPHONE ENCOUNTER
Malika Wolf --    KEIRA from Krissy VERA of [facility].     Krissy called to report that patient slipped off side of bed while transferring from the commode to the bed. Patient landed on knees. PCA unable to get patient back into bed.     911 was called for assistance. Patient has some slight bruising in the inner biceps due to the process of lifting her back into bed. Other than that the patient is fine without pain related to the fall. No other bruising.     Odalis Phoenix, SONIAN RN  [facility]

## 2023-12-18 NOTE — PROGRESS NOTES
Clinic Care Coordination Contact  [facility]/Voicemail    Clinical Data: Care Coordinator Outreach    Outreach Documentation Number of Outreach Attempt   12/4/2023   3:47 PM 1   12/18/2023  10:10 AM 2       Patient's phone number did not have an option for CHW to leave a message.    Plan: Care Coordinator will route patient to [facility] to determine if further outreaches should be made.     Lee Ann Palma, CHW, B.A. [facility] Care Coordination  [facility]:   [facility]  [phone]

## 2023-12-22 NOTE — PROGRESS NOTES
[facility] CORE Clinic - CardioMEMS Reading Review    December 22, 2023   CardioMEMS reviewed.  Current diuretic: Bumex 3 mg daily  Recent plan of care changes: none. 1 reading sent this week and is in range.     Current Threshold parameters:         Today's Waveform:       Readings:               Rosina Mays RN

## 2023-12-27 NOTE — LETTER
[facility] CARE COORDINATION  [address]  [place] [address]    December 27, 2023    Mariel Ribeiro  [address] [place] [address]      Dear Mariel,    I have been attempting to reach you since our last contact. I would like to continue to work with you and provide any additional support you may need on achieving your health care related goals. I would appreciate if you would give me a call at [phone] to let me know if you would like to continue working together. I know that there are many things that can affect our ability to communicate and I hope we can continue to work together.    All of us at the [facility] are invested in your health and are here to assist you in meeting your goals.     Sincerely,    SONIA ZapataN, RN, PHN   Care Coordinator-Ambulatory Care Management  [facility]  [phone]

## 2023-12-27 NOTE — PROGRESS NOTES
[facility] CORE Clinic - CardioMEMS Reading Review    December 27, 2023   CardioMEMS reviewed.  Current diuretic: Bumex 3 mg daily  Recent plan of care changes:     Current Threshold parameters:         Today's Waveform:       Readings:               Rosina Mays RN

## 2023-12-27 NOTE — PROGRESS NOTES
Clinic Care Coordination Contact    Situation: Patient chart reviewed by care coordinator.    Background: Patient is open to Care Coordination     Assessment: CHW attempted to reach Patient x2 on 12/4 and 12/18.     Plan/Recommendations: Care Coordination episode will be closed. No further outreaches planned at this time. Will sent unable to contact letter via [employer]. If new needs arise please make a new referral.     SONIA ZapataN, RN, PHN   Care Coordinator-Ambulatory Care Management  [facility]  [phone]

## 2024-01-01 ENCOUNTER — TELEPHONE (OUTPATIENT)
Dept: FAMILY MEDICINE | Facility: CLINIC | Age: 79
End: 2024-01-01

## 2024-01-01 ENCOUNTER — CARE COORDINATION (OUTPATIENT)
Dept: CARDIOLOGY | Facility: CLINIC | Age: 79
End: 2024-01-01
Payer: MEDICARE

## 2024-01-01 ENCOUNTER — MYC MEDICAL ADVICE (OUTPATIENT)
Dept: CARDIOLOGY | Facility: CLINIC | Age: 79
End: 2024-01-01
Payer: MEDICARE

## 2024-01-01 ENCOUNTER — PATIENT OUTREACH (OUTPATIENT)
Dept: CARE COORDINATION | Facility: CLINIC | Age: 79
End: 2024-01-01
Payer: MEDICARE

## 2024-01-01 ENCOUNTER — ALLIED HEALTH/NURSE VISIT (OUTPATIENT)
Dept: CARDIOLOGY | Facility: CLINIC | Age: 79
End: 2024-01-01
Attending: NURSE PRACTITIONER
Payer: MEDICARE

## 2024-01-01 ENCOUNTER — VIRTUAL VISIT (OUTPATIENT)
Dept: CARDIOLOGY | Facility: CLINIC | Age: 79
End: 2024-01-01
Attending: STUDENT IN AN ORGANIZED HEALTH CARE EDUCATION/TRAINING PROGRAM
Payer: MEDICARE

## 2024-01-01 ENCOUNTER — ALLIED HEALTH/NURSE VISIT (OUTPATIENT)
Dept: CARDIOLOGY | Facility: CLINIC | Age: 79
DRG: 291 | End: 2024-01-01
Attending: NURSE PRACTITIONER
Payer: MEDICARE

## 2024-01-01 ENCOUNTER — TELEPHONE (OUTPATIENT)
Dept: EMERGENCY MEDICINE | Facility: CLINIC | Age: 79
End: 2024-01-01
Payer: MEDICARE

## 2024-01-01 ENCOUNTER — APPOINTMENT (OUTPATIENT)
Dept: CT IMAGING | Facility: CLINIC | Age: 79
DRG: 291 | End: 2024-01-01
Attending: PHYSICIAN ASSISTANT
Payer: MEDICARE

## 2024-01-01 ENCOUNTER — MYC MEDICAL ADVICE (OUTPATIENT)
Dept: FAMILY MEDICINE | Facility: CLINIC | Age: 79
End: 2024-01-01
Payer: MEDICARE

## 2024-01-01 ENCOUNTER — MEDICAL CORRESPONDENCE (OUTPATIENT)
Dept: HEALTH INFORMATION MANAGEMENT | Facility: CLINIC | Age: 79
End: 2024-01-01
Payer: MEDICARE

## 2024-01-01 ENCOUNTER — TELEPHONE (OUTPATIENT)
Dept: FAMILY MEDICINE | Facility: CLINIC | Age: 79
End: 2024-01-01
Payer: MEDICARE

## 2024-01-01 ENCOUNTER — LAB REQUISITION (OUTPATIENT)
Dept: LAB | Facility: HOSPITAL | Age: 79
End: 2024-01-01
Payer: MEDICARE

## 2024-01-01 ENCOUNTER — APPOINTMENT (OUTPATIENT)
Dept: GENERAL RADIOLOGY | Facility: CLINIC | Age: 79
End: 2024-01-01
Attending: PHYSICIAN ASSISTANT
Payer: MEDICARE

## 2024-01-01 ENCOUNTER — DOCUMENTATION ONLY (OUTPATIENT)
Dept: OTHER | Facility: CLINIC | Age: 79
End: 2024-01-01
Payer: MEDICARE

## 2024-01-01 ENCOUNTER — MYC MEDICAL ADVICE (OUTPATIENT)
Dept: NEPHROLOGY | Facility: CLINIC | Age: 79
End: 2024-01-01
Payer: MEDICARE

## 2024-01-01 ENCOUNTER — VIRTUAL VISIT (OUTPATIENT)
Dept: PHARMACY | Facility: CLINIC | Age: 79
End: 2024-01-01
Attending: NURSE PRACTITIONER

## 2024-01-01 ENCOUNTER — APPOINTMENT (OUTPATIENT)
Dept: MRI IMAGING | Facility: CLINIC | Age: 79
End: 2024-01-01
Attending: PHYSICIAN ASSISTANT
Payer: MEDICARE

## 2024-01-01 ENCOUNTER — VIRTUAL VISIT (OUTPATIENT)
Dept: CARDIOLOGY | Facility: CLINIC | Age: 79
End: 2024-01-01
Payer: MEDICARE

## 2024-01-01 ENCOUNTER — MYC REFILL (OUTPATIENT)
Dept: FAMILY MEDICINE | Facility: CLINIC | Age: 79
End: 2024-01-01
Payer: MEDICARE

## 2024-01-01 ENCOUNTER — APPOINTMENT (OUTPATIENT)
Dept: GENERAL RADIOLOGY | Facility: CLINIC | Age: 79
DRG: 177 | End: 2024-01-01
Attending: EMERGENCY MEDICINE
Payer: MEDICARE

## 2024-01-01 ENCOUNTER — TELEPHONE (OUTPATIENT)
Dept: CARDIOLOGY | Facility: CLINIC | Age: 79
End: 2024-01-01
Payer: MEDICARE

## 2024-01-01 ENCOUNTER — APPOINTMENT (OUTPATIENT)
Dept: PHYSICAL THERAPY | Facility: CLINIC | Age: 79
DRG: 291 | End: 2024-01-01
Payer: MEDICARE

## 2024-01-01 ENCOUNTER — VIRTUAL VISIT (OUTPATIENT)
Dept: FAMILY MEDICINE | Facility: CLINIC | Age: 79
End: 2024-01-01
Payer: MEDICARE

## 2024-01-01 ENCOUNTER — HEALTH MAINTENANCE LETTER (OUTPATIENT)
Age: 79
End: 2024-01-01

## 2024-01-01 ENCOUNTER — APPOINTMENT (OUTPATIENT)
Dept: PHYSICAL THERAPY | Facility: CLINIC | Age: 79
DRG: 291 | End: 2024-01-01
Attending: NURSE PRACTITIONER
Payer: MEDICARE

## 2024-01-01 ENCOUNTER — APPOINTMENT (OUTPATIENT)
Dept: ULTRASOUND IMAGING | Facility: CLINIC | Age: 79
DRG: 291 | End: 2024-01-01
Attending: STUDENT IN AN ORGANIZED HEALTH CARE EDUCATION/TRAINING PROGRAM
Payer: MEDICARE

## 2024-01-01 ENCOUNTER — APPOINTMENT (OUTPATIENT)
Dept: PHYSICAL THERAPY | Facility: CLINIC | Age: 79
DRG: 291 | End: 2024-01-01
Attending: STUDENT IN AN ORGANIZED HEALTH CARE EDUCATION/TRAINING PROGRAM
Payer: MEDICARE

## 2024-01-01 ENCOUNTER — MEDICAL CORRESPONDENCE (OUTPATIENT)
Dept: HEALTH INFORMATION MANAGEMENT | Facility: CLINIC | Age: 79
End: 2024-01-01

## 2024-01-01 ENCOUNTER — TELEPHONE (OUTPATIENT)
Dept: NEPHROLOGY | Facility: CLINIC | Age: 79
End: 2024-01-01
Payer: MEDICARE

## 2024-01-01 ENCOUNTER — HOSPITAL ENCOUNTER (INPATIENT)
Facility: CLINIC | Age: 79
LOS: 13 days | Discharge: NURSING, REHAB OR LONG-TERM CARE | DRG: 291 | End: 2024-07-29
Attending: EMERGENCY MEDICINE | Admitting: STUDENT IN AN ORGANIZED HEALTH CARE EDUCATION/TRAINING PROGRAM
Payer: MEDICARE

## 2024-01-01 ENCOUNTER — APPOINTMENT (OUTPATIENT)
Dept: CT IMAGING | Facility: CLINIC | Age: 79
DRG: 177 | End: 2024-01-01
Attending: PHYSICIAN ASSISTANT
Payer: MEDICARE

## 2024-01-01 ENCOUNTER — VIRTUAL VISIT (OUTPATIENT)
Dept: NEPHROLOGY | Facility: CLINIC | Age: 79
End: 2024-01-01
Attending: PHYSICIAN ASSISTANT
Payer: MEDICARE

## 2024-01-01 ENCOUNTER — APPOINTMENT (OUTPATIENT)
Dept: GENERAL RADIOLOGY | Facility: CLINIC | Age: 79
DRG: 291 | End: 2024-01-01
Attending: PHYSICIAN ASSISTANT
Payer: MEDICARE

## 2024-01-01 ENCOUNTER — HOSPITAL ENCOUNTER (INPATIENT)
Facility: CLINIC | Age: 79
LOS: 8 days | Discharge: HOSPICE | DRG: 177 | End: 2024-09-10
Attending: EMERGENCY MEDICINE | Admitting: INTERNAL MEDICINE
Payer: MEDICARE

## 2024-01-01 ENCOUNTER — VIRTUAL VISIT (OUTPATIENT)
Dept: ONCOLOGY | Facility: CLINIC | Age: 79
End: 2024-01-01
Attending: INTERNAL MEDICINE
Payer: MEDICARE

## 2024-01-01 ENCOUNTER — MYC MEDICAL ADVICE (OUTPATIENT)
Dept: UROLOGY | Facility: CLINIC | Age: 79
End: 2024-01-01
Payer: MEDICARE

## 2024-01-01 ENCOUNTER — MYC MEDICAL ADVICE (OUTPATIENT)
Dept: FAMILY MEDICINE | Facility: CLINIC | Age: 79
End: 2024-01-01

## 2024-01-01 ENCOUNTER — HOSPITAL ENCOUNTER (EMERGENCY)
Facility: CLINIC | Age: 79
Discharge: HOME | End: 2024-05-23
Attending: EMERGENCY MEDICINE | Admitting: EMERGENCY MEDICINE
Payer: MEDICARE

## 2024-01-01 ENCOUNTER — PATIENT OUTREACH (OUTPATIENT)
Dept: FAMILY MEDICINE | Facility: CLINIC | Age: 79
End: 2024-01-01
Payer: MEDICARE

## 2024-01-01 ENCOUNTER — APPOINTMENT (OUTPATIENT)
Dept: CARDIOLOGY | Facility: CLINIC | Age: 79
DRG: 291 | End: 2024-01-01
Attending: STUDENT IN AN ORGANIZED HEALTH CARE EDUCATION/TRAINING PROGRAM
Payer: MEDICARE

## 2024-01-01 ENCOUNTER — HOSPITAL ENCOUNTER (EMERGENCY)
Facility: CLINIC | Age: 79
Discharge: HOME | End: 2024-05-23
Payer: MEDICARE

## 2024-01-01 ENCOUNTER — APPOINTMENT (OUTPATIENT)
Dept: CARDIOLOGY | Facility: CLINIC | Age: 79
DRG: 177 | End: 2024-01-01
Attending: PHYSICIAN ASSISTANT
Payer: MEDICARE

## 2024-01-01 ENCOUNTER — PATIENT OUTREACH (OUTPATIENT)
Dept: CARDIOLOGY | Facility: CLINIC | Age: 79
End: 2024-01-01
Payer: MEDICARE

## 2024-01-01 ENCOUNTER — APPOINTMENT (OUTPATIENT)
Dept: CT IMAGING | Facility: CLINIC | Age: 79
End: 2024-01-01
Attending: PHYSICIAN ASSISTANT
Payer: MEDICARE

## 2024-01-01 VITALS
TEMPERATURE: 97.3 F | DIASTOLIC BLOOD PRESSURE: 72 MMHG | RESPIRATION RATE: 16 BRPM | SYSTOLIC BLOOD PRESSURE: 102 MMHG | HEART RATE: 63 BPM | OXYGEN SATURATION: 95 %

## 2024-01-01 VITALS
DIASTOLIC BLOOD PRESSURE: 122 MMHG | RESPIRATION RATE: 16 BRPM | HEIGHT: 66 IN | SYSTOLIC BLOOD PRESSURE: 140 MMHG | BODY MASS INDEX: 45.32 KG/M2 | OXYGEN SATURATION: 97 % | TEMPERATURE: 96 F | WEIGHT: 282 LBS | HEART RATE: 73 BPM

## 2024-01-01 VITALS — BODY MASS INDEX: 46.61 KG/M2 | WEIGHT: 290 LBS | HEIGHT: 66 IN

## 2024-01-01 VITALS
HEART RATE: 83 BPM | OXYGEN SATURATION: 98 % | DIASTOLIC BLOOD PRESSURE: 87 MMHG | RESPIRATION RATE: 16 BRPM | SYSTOLIC BLOOD PRESSURE: 119 MMHG | TEMPERATURE: 98.8 F

## 2024-01-01 VITALS — WEIGHT: 270 LBS | BODY MASS INDEX: 43.58 KG/M2

## 2024-01-01 DIAGNOSIS — I50.22 CHRONIC SYSTOLIC CONGESTIVE HEART FAILURE (H): Primary | ICD-10-CM

## 2024-01-01 DIAGNOSIS — R35.0 URINARY FREQUENCY: ICD-10-CM

## 2024-01-01 DIAGNOSIS — N18.4 TYPE 2 DIABETES MELLITUS WITH STAGE 4 CHRONIC KIDNEY DISEASE, WITH LONG-TERM CURRENT USE OF INSULIN (H): ICD-10-CM

## 2024-01-01 DIAGNOSIS — I12.0 HYPERTENSIVE CHRONIC KIDNEY DISEASE WITH STAGE 5 CHRONIC KIDNEY DISEASE OR END STAGE RENAL DISEASE (H): ICD-10-CM

## 2024-01-01 DIAGNOSIS — N18.32 TYPE 2 DIABETES MELLITUS WITH STAGE 3B CHRONIC KIDNEY DISEASE, WITH LONG-TERM CURRENT USE OF INSULIN (H): ICD-10-CM

## 2024-01-01 DIAGNOSIS — L30.4 INTERTRIGO: ICD-10-CM

## 2024-01-01 DIAGNOSIS — M25.512 PAIN IN JOINT OF LEFT SHOULDER: ICD-10-CM

## 2024-01-01 DIAGNOSIS — F33.0 MILD EPISODE OF RECURRENT MAJOR DEPRESSIVE DISORDER (H): ICD-10-CM

## 2024-01-01 DIAGNOSIS — N60.82 SEBACEOUS CYST OF SKIN OF LEFT BREAST: ICD-10-CM

## 2024-01-01 DIAGNOSIS — N39.0 UTI (URINARY TRACT INFECTION): ICD-10-CM

## 2024-01-01 DIAGNOSIS — J44.9 CHRONIC OBSTRUCTIVE PULMONARY DISEASE, UNSPECIFIED COPD TYPE (H): ICD-10-CM

## 2024-01-01 DIAGNOSIS — E11.22 TYPE 2 DIABETES MELLITUS WITH STAGE 4 CHRONIC KIDNEY DISEASE, WITH LONG-TERM CURRENT USE OF INSULIN (H): ICD-10-CM

## 2024-01-01 DIAGNOSIS — N18.9 CHRONIC KIDNEY DISEASE, UNSPECIFIED: ICD-10-CM

## 2024-01-01 DIAGNOSIS — M1A.09X0 IDIOPATHIC CHRONIC GOUT OF MULTIPLE SITES WITHOUT TOPHUS: ICD-10-CM

## 2024-01-01 DIAGNOSIS — E11.22 TYPE 2 DIABETES MELLITUS WITH STAGE 3B CHRONIC KIDNEY DISEASE, WITH LONG-TERM CURRENT USE OF INSULIN (H): ICD-10-CM

## 2024-01-01 DIAGNOSIS — K59.01 SLOW TRANSIT CONSTIPATION: ICD-10-CM

## 2024-01-01 DIAGNOSIS — I50.32 CHRONIC DIASTOLIC HEART FAILURE (H): ICD-10-CM

## 2024-01-01 DIAGNOSIS — I50.32 CHRONIC DIASTOLIC HEART FAILURE (H): Chronic | ICD-10-CM

## 2024-01-01 DIAGNOSIS — N18.30 STAGE 3 CHRONIC KIDNEY DISEASE, UNSPECIFIED WHETHER STAGE 3A OR 3B CKD (H): ICD-10-CM

## 2024-01-01 DIAGNOSIS — G93.40 ENCEPHALOPATHY, UNSPECIFIED TYPE: ICD-10-CM

## 2024-01-01 DIAGNOSIS — Z79.4 TYPE 2 DIABETES MELLITUS WITH STAGE 4 CHRONIC KIDNEY DISEASE, WITH LONG-TERM CURRENT USE OF INSULIN (H): ICD-10-CM

## 2024-01-01 DIAGNOSIS — N39.0 URINARY TRACT INFECTION WITHOUT HEMATURIA, SITE UNSPECIFIED: Primary | ICD-10-CM

## 2024-01-01 DIAGNOSIS — Z17.0 MALIGNANT NEOPLASM OF LOWER-INNER QUADRANT OF LEFT BREAST IN FEMALE, ESTROGEN RECEPTOR POSITIVE (H): ICD-10-CM

## 2024-01-01 DIAGNOSIS — I50.32 CHRONIC DIASTOLIC HEART FAILURE (H): Primary | ICD-10-CM

## 2024-01-01 DIAGNOSIS — Z79.899 POLYPHARMACY: ICD-10-CM

## 2024-01-01 DIAGNOSIS — I50.32 CHRONIC HEART FAILURE WITH PRESERVED EJECTION FRACTION (HFPEF) (H): ICD-10-CM

## 2024-01-01 DIAGNOSIS — I50.23 ACUTE ON CHRONIC SYSTOLIC HEART FAILURE (H): ICD-10-CM

## 2024-01-01 DIAGNOSIS — D05.12 DUCTAL CARCINOMA IN SITU (DCIS) OF LEFT BREAST: ICD-10-CM

## 2024-01-01 DIAGNOSIS — Z79.4 TYPE 2 DIABETES MELLITUS WITH STAGE 3B CHRONIC KIDNEY DISEASE, WITH LONG-TERM CURRENT USE OF INSULIN (H): Primary | ICD-10-CM

## 2024-01-01 DIAGNOSIS — M1A.0110 CHRONIC GOUT OF RIGHT SHOULDER, UNSPECIFIED CAUSE: ICD-10-CM

## 2024-01-01 DIAGNOSIS — E03.8 OTHER SPECIFIED HYPOTHYROIDISM: ICD-10-CM

## 2024-01-01 DIAGNOSIS — I10 BENIGN ESSENTIAL HYPERTENSION: ICD-10-CM

## 2024-01-01 DIAGNOSIS — I25.10 ATHEROSCLEROSIS OF NATIVE CORONARY ARTERY WITHOUT ANGINA PECTORIS, UNSPECIFIED WHETHER NATIVE OR TRANSPLANTED HEART: ICD-10-CM

## 2024-01-01 DIAGNOSIS — R29.6 FALLS FREQUENTLY: ICD-10-CM

## 2024-01-01 DIAGNOSIS — I25.5 ISCHEMIC CARDIOMYOPATHY: ICD-10-CM

## 2024-01-01 DIAGNOSIS — R41.89 COGNITIVE DEFICITS: ICD-10-CM

## 2024-01-01 DIAGNOSIS — R41.82 ALTERED MENTAL STATUS, UNSPECIFIED ALTERED MENTAL STATUS TYPE: ICD-10-CM

## 2024-01-01 DIAGNOSIS — Z53.9 DIAGNOSIS NOT YET DEFINED: Primary | ICD-10-CM

## 2024-01-01 DIAGNOSIS — C50.312 MALIGNANT NEOPLASM OF LOWER-INNER QUADRANT OF LEFT BREAST IN FEMALE, ESTROGEN RECEPTOR POSITIVE (H): ICD-10-CM

## 2024-01-01 DIAGNOSIS — I50.22 CHRONIC SYSTOLIC CONGESTIVE HEART FAILURE (H): ICD-10-CM

## 2024-01-01 DIAGNOSIS — R32 URINARY INCONTINENCE, UNSPECIFIED TYPE: ICD-10-CM

## 2024-01-01 DIAGNOSIS — N18.32 CHRONIC KIDNEY DISEASE, STAGE 3B (H): Primary | ICD-10-CM

## 2024-01-01 DIAGNOSIS — R11.0 NAUSEA: ICD-10-CM

## 2024-01-01 DIAGNOSIS — D50.8 IRON DEFICIENCY ANEMIA SECONDARY TO INADEQUATE DIETARY IRON INTAKE: ICD-10-CM

## 2024-01-01 DIAGNOSIS — E11.22 TYPE 2 DIABETES MELLITUS WITH STAGE 3B CHRONIC KIDNEY DISEASE, WITH LONG-TERM CURRENT USE OF INSULIN (H): Primary | ICD-10-CM

## 2024-01-01 DIAGNOSIS — R55 NEAR SYNCOPE: Primary | ICD-10-CM

## 2024-01-01 DIAGNOSIS — R39.81 FUNCTIONAL URINARY INCONTINENCE: ICD-10-CM

## 2024-01-01 DIAGNOSIS — K59.09 CHRONIC CONSTIPATION: ICD-10-CM

## 2024-01-01 DIAGNOSIS — U07.1 COVID-19 VIRUS INFECTION: ICD-10-CM

## 2024-01-01 DIAGNOSIS — M1A.3310 CHRONIC GOUT OF RIGHT WRIST DUE TO RENAL IMPAIRMENT WITHOUT TOPHUS: ICD-10-CM

## 2024-01-01 DIAGNOSIS — M1A.09X0 IDIOPATHIC CHRONIC GOUT OF MULTIPLE SITES WITHOUT TOPHUS: Primary | ICD-10-CM

## 2024-01-01 DIAGNOSIS — I50.23 ACUTE ON CHRONIC SYSTOLIC HEART FAILURE (H): Primary | ICD-10-CM

## 2024-01-01 DIAGNOSIS — N39.0 URINARY TRACT INFECTION WITHOUT HEMATURIA, SITE UNSPECIFIED: ICD-10-CM

## 2024-01-01 DIAGNOSIS — E79.0 HYPERURICEMIA: ICD-10-CM

## 2024-01-01 DIAGNOSIS — E66.01 MORBID OBESITY (H): ICD-10-CM

## 2024-01-01 DIAGNOSIS — L89.216 PRESSURE INJURY OF DEEP TISSUE OF RIGHT HIP: Primary | ICD-10-CM

## 2024-01-01 DIAGNOSIS — I50.30 (HFPEF) HEART FAILURE WITH PRESERVED EJECTION FRACTION (H): ICD-10-CM

## 2024-01-01 DIAGNOSIS — R80.9 ALBUMINURIA: ICD-10-CM

## 2024-01-01 DIAGNOSIS — R32 URINARY INCONTINENCE, UNSPECIFIED TYPE: Primary | ICD-10-CM

## 2024-01-01 DIAGNOSIS — F32.A CHRONIC DEPRESSIVE DISORDER: ICD-10-CM

## 2024-01-01 DIAGNOSIS — I50.32 CHRONIC HEART FAILURE WITH PRESERVED EJECTION FRACTION (HFPEF) (H): Primary | ICD-10-CM

## 2024-01-01 DIAGNOSIS — N39.0 URINARY TRACT INFECTION, SITE NOT SPECIFIED: ICD-10-CM

## 2024-01-01 DIAGNOSIS — J44.9 CHRONIC OBSTRUCTIVE PULMONARY DISEASE, UNSPECIFIED COPD TYPE (H): Chronic | ICD-10-CM

## 2024-01-01 DIAGNOSIS — R68.89 COPIOUS ORAL SECRETIONS: ICD-10-CM

## 2024-01-01 DIAGNOSIS — F33.0 MILD EPISODE OF RECURRENT MAJOR DEPRESSIVE DISORDER (H): Primary | ICD-10-CM

## 2024-01-01 DIAGNOSIS — R41.82 ALTERED MENTAL STATUS: ICD-10-CM

## 2024-01-01 DIAGNOSIS — N39.46 MIXED INCONTINENCE: ICD-10-CM

## 2024-01-01 DIAGNOSIS — I10 HYPERTENSION, ESSENTIAL: ICD-10-CM

## 2024-01-01 DIAGNOSIS — Z79.4 TYPE 2 DIABETES MELLITUS WITH OTHER SPECIFIED COMPLICATION, WITH LONG-TERM CURRENT USE OF INSULIN (H): ICD-10-CM

## 2024-01-01 DIAGNOSIS — Z86.79 PERSONAL HISTORY OF OTHER DISEASES OF THE CIRCULATORY SYSTEM: ICD-10-CM

## 2024-01-01 DIAGNOSIS — Z79.4 TYPE 2 DIABETES MELLITUS WITH STAGE 3B CHRONIC KIDNEY DISEASE, WITH LONG-TERM CURRENT USE OF INSULIN (H): ICD-10-CM

## 2024-01-01 DIAGNOSIS — G89.29 CHRONIC LEFT SHOULDER PAIN: ICD-10-CM

## 2024-01-01 DIAGNOSIS — R42 DIZZINESS: ICD-10-CM

## 2024-01-01 DIAGNOSIS — R53.1 LEFT-SIDED WEAKNESS: ICD-10-CM

## 2024-01-01 DIAGNOSIS — D05.12 DUCTAL CARCINOMA IN SITU (DCIS) OF LEFT BREAST: Primary | ICD-10-CM

## 2024-01-01 DIAGNOSIS — R53.1 WEAKNESS GENERALIZED: ICD-10-CM

## 2024-01-01 DIAGNOSIS — N18.32 TYPE 2 DIABETES MELLITUS WITH STAGE 3B CHRONIC KIDNEY DISEASE, WITH LONG-TERM CURRENT USE OF INSULIN (H): Primary | ICD-10-CM

## 2024-01-01 DIAGNOSIS — Z51.5 HOSPICE CARE PATIENT: ICD-10-CM

## 2024-01-01 DIAGNOSIS — F41.9 ANXIETY: ICD-10-CM

## 2024-01-01 DIAGNOSIS — M25.512 ACUTE PAIN OF LEFT SHOULDER: ICD-10-CM

## 2024-01-01 DIAGNOSIS — M25.512 CHRONIC LEFT SHOULDER PAIN: ICD-10-CM

## 2024-01-01 DIAGNOSIS — R29.818 TRANSIENT NEUROLOGICAL SYMPTOMS: ICD-10-CM

## 2024-01-01 DIAGNOSIS — R52 PAIN: ICD-10-CM

## 2024-01-01 DIAGNOSIS — R26.89 DECREASED MOBILITY: ICD-10-CM

## 2024-01-01 DIAGNOSIS — E11.69 TYPE 2 DIABETES MELLITUS WITH OTHER SPECIFIED COMPLICATION, WITH LONG-TERM CURRENT USE OF INSULIN (H): ICD-10-CM

## 2024-01-01 DIAGNOSIS — R53.1 GENERALIZED WEAKNESS: ICD-10-CM

## 2024-01-01 DIAGNOSIS — I77.9 BILATERAL CAROTID ARTERY DISEASE, UNSPECIFIED TYPE (H): ICD-10-CM

## 2024-01-01 DIAGNOSIS — R45.1 AGITATION: ICD-10-CM

## 2024-01-01 DIAGNOSIS — R60.9 EDEMA, UNSPECIFIED TYPE: ICD-10-CM

## 2024-01-01 LAB
ALBUMIN SERPL BCG-MCNC: 3.4 G/DL (ref 3.5–5.2)
ALBUMIN SERPL BCG-MCNC: 3.5 G/DL (ref 3.5–5.2)
ALBUMIN SERPL BCG-MCNC: 3.9 G/DL (ref 3.5–5.2)
ALBUMIN UR-MCNC: 20 MG/DL
ALBUMIN UR-MCNC: 20 MG/DL
ALBUMIN UR-MCNC: NEGATIVE MG/DL
ALP SERPL-CCNC: 118 U/L (ref 40–150)
ALP SERPL-CCNC: 120 U/L (ref 40–150)
ALP SERPL-CCNC: 121 U/L (ref 40–150)
ALP SERPL-CCNC: 125 U/L (ref 40–150)
ALP SERPL-CCNC: 127 U/L (ref 40–150)
ALT SERPL W P-5'-P-CCNC: 12 U/L (ref 0–50)
ALT SERPL W P-5'-P-CCNC: 14 U/L (ref 0–50)
ALT SERPL W P-5'-P-CCNC: 15 U/L (ref 0–50)
ALT SERPL W P-5'-P-CCNC: 18 U/L (ref 0–50)
ALT SERPL W P-5'-P-CCNC: 24 U/L (ref 0–50)
ANION GAP SERPL CALCULATED.3IONS-SCNC: 10 MMOL/L (ref 7–15)
ANION GAP SERPL CALCULATED.3IONS-SCNC: 11 MMOL/L (ref 7–15)
ANION GAP SERPL CALCULATED.3IONS-SCNC: 12 MMOL/L (ref 7–15)
ANION GAP SERPL CALCULATED.3IONS-SCNC: 13 MMOL/L (ref 7–15)
ANION GAP SERPL CALCULATED.3IONS-SCNC: 14 MMOL/L (ref 7–15)
ANION GAP SERPL CALCULATED.3IONS-SCNC: 15 MMOL/L (ref 7–15)
ANION GAP SERPL CALCULATED.3IONS-SCNC: 9 MMOL/L (ref 7–15)
APPEARANCE UR: ABNORMAL
APPEARANCE UR: CLEAR
APPEARANCE UR: CLEAR
AST SERPL W P-5'-P-CCNC: 21 U/L (ref 0–45)
AST SERPL W P-5'-P-CCNC: 23 U/L (ref 0–45)
AST SERPL W P-5'-P-CCNC: 30 U/L (ref 0–45)
AST SERPL W P-5'-P-CCNC: 31 U/L (ref 0–45)
AST SERPL W P-5'-P-CCNC: 33 U/L (ref 0–45)
ATRIAL RATE - MUSE: 59 BPM
ATRIAL RATE - MUSE: 67 BPM
ATRIAL RATE - MUSE: 90 BPM
ATRIAL RATE - MUSE: 96 BPM
BACTERIA #/AREA URNS HPF: ABNORMAL /HPF
BACTERIA BLD CULT: NO GROWTH
BACTERIA UR CULT: ABNORMAL
BACTERIA UR CULT: NO GROWTH
BACTERIA UR CULT: NORMAL
BASE EXCESS BLDV CALC-SCNC: 1.2 MMOL/L (ref -3–3)
BASE EXCESS BLDV CALC-SCNC: 1.5 MMOL/L (ref -3–3)
BASE EXCESS BLDV CALC-SCNC: 3.2 MMOL/L (ref -3–3)
BASE EXCESS BLDV CALC-SCNC: 4.9 MMOL/L (ref -3–3)
BASE EXCESS BLDV CALC-SCNC: 5.8 MMOL/L (ref -3–3)
BASOPHILS # BLD AUTO: 0 10E3/UL (ref 0–0.2)
BASOPHILS # BLD AUTO: 0.1 10E3/UL (ref 0–0.2)
BASOPHILS NFR BLD AUTO: 0 %
BASOPHILS NFR BLD AUTO: 1 %
BILIRUB SERPL-MCNC: 0.4 MG/DL
BILIRUB SERPL-MCNC: 0.6 MG/DL
BILIRUB SERPL-MCNC: 0.6 MG/DL
BILIRUB UR QL STRIP: NEGATIVE
BUN SERPL-MCNC: 26.1 MG/DL (ref 8–23)
BUN SERPL-MCNC: 26.6 MG/DL (ref 8–23)
BUN SERPL-MCNC: 27.6 MG/DL (ref 8–23)
BUN SERPL-MCNC: 33.6 MG/DL (ref 8–23)
BUN SERPL-MCNC: 39.4 MG/DL (ref 8–23)
BUN SERPL-MCNC: 39.8 MG/DL (ref 8–23)
BUN SERPL-MCNC: 39.9 MG/DL (ref 8–23)
BUN SERPL-MCNC: 40.4 MG/DL (ref 8–23)
BUN SERPL-MCNC: 41.6 MG/DL (ref 8–23)
BUN SERPL-MCNC: 43.7 MG/DL (ref 8–23)
BUN SERPL-MCNC: 44.3 MG/DL (ref 8–23)
BUN SERPL-MCNC: 44.4 MG/DL (ref 8–23)
BUN SERPL-MCNC: 44.4 MG/DL (ref 8–23)
BUN SERPL-MCNC: 46.3 MG/DL (ref 8–23)
BUN SERPL-MCNC: 46.7 MG/DL (ref 8–23)
BUN SERPL-MCNC: 48.7 MG/DL (ref 8–23)
BUN SERPL-MCNC: 52.1 MG/DL (ref 8–23)
BUN SERPL-MCNC: 55.1 MG/DL (ref 8–23)
BUN SERPL-MCNC: 56.1 MG/DL (ref 8–23)
BUN SERPL-MCNC: 56.3 MG/DL (ref 8–23)
CALCIUM SERPL-MCNC: 10 MG/DL (ref 8.8–10.4)
CALCIUM SERPL-MCNC: 10.2 MG/DL (ref 8.8–10.2)
CALCIUM SERPL-MCNC: 10.2 MG/DL (ref 8.8–10.4)
CALCIUM SERPL-MCNC: 10.2 MG/DL (ref 8.8–10.4)
CALCIUM SERPL-MCNC: 9 MG/DL (ref 8.8–10.4)
CALCIUM SERPL-MCNC: 9.3 MG/DL (ref 8.8–10.4)
CALCIUM SERPL-MCNC: 9.4 MG/DL (ref 8.8–10.4)
CALCIUM SERPL-MCNC: 9.5 MG/DL (ref 8.8–10.4)
CALCIUM SERPL-MCNC: 9.6 MG/DL (ref 8.8–10.2)
CALCIUM SERPL-MCNC: 9.7 MG/DL (ref 8.8–10.4)
CALCIUM SERPL-MCNC: 9.7 MG/DL (ref 8.8–10.4)
CALCIUM SERPL-MCNC: 9.9 MG/DL (ref 8.8–10.2)
CALCIUM SERPL-MCNC: 9.9 MG/DL (ref 8.8–10.4)
CHLORIDE SERPL-SCNC: 100 MMOL/L (ref 98–107)
CHLORIDE SERPL-SCNC: 101 MMOL/L (ref 98–107)
CHLORIDE SERPL-SCNC: 102 MMOL/L (ref 98–107)
CHLORIDE SERPL-SCNC: 103 MMOL/L (ref 98–107)
CHLORIDE SERPL-SCNC: 104 MMOL/L (ref 98–107)
CHLORIDE SERPL-SCNC: 104 MMOL/L (ref 98–107)
CHLORIDE SERPL-SCNC: 105 MMOL/L (ref 98–107)
CHLORIDE SERPL-SCNC: 105 MMOL/L (ref 98–107)
CHLORIDE SERPL-SCNC: 106 MMOL/L (ref 98–107)
CHLORIDE SERPL-SCNC: 98 MMOL/L (ref 98–107)
CHLORIDE SERPL-SCNC: 98 MMOL/L (ref 98–107)
COLOR UR AUTO: ABNORMAL
COLOR UR AUTO: COLORLESS
COLOR UR AUTO: YELLOW
CREAT SERPL-MCNC: 1.19 MG/DL (ref 0.51–0.95)
CREAT SERPL-MCNC: 1.27 MG/DL (ref 0.51–0.95)
CREAT SERPL-MCNC: 1.29 MG/DL (ref 0.51–0.95)
CREAT SERPL-MCNC: 1.29 MG/DL (ref 0.51–0.95)
CREAT SERPL-MCNC: 1.31 MG/DL (ref 0.51–0.95)
CREAT SERPL-MCNC: 1.32 MG/DL (ref 0.51–0.95)
CREAT SERPL-MCNC: 1.34 MG/DL (ref 0.51–0.95)
CREAT SERPL-MCNC: 1.34 MG/DL (ref 0.51–0.95)
CREAT SERPL-MCNC: 1.35 MG/DL (ref 0.51–0.95)
CREAT SERPL-MCNC: 1.36 MG/DL (ref 0.51–0.95)
CREAT SERPL-MCNC: 1.37 MG/DL (ref 0.51–0.95)
CREAT SERPL-MCNC: 1.41 MG/DL (ref 0.51–0.95)
CREAT SERPL-MCNC: 1.42 MG/DL (ref 0.51–0.95)
CREAT SERPL-MCNC: 1.43 MG/DL (ref 0.51–0.95)
CREAT SERPL-MCNC: 1.56 MG/DL (ref 0.51–0.95)
CREAT SERPL-MCNC: 1.57 MG/DL (ref 0.51–0.95)
CREAT SERPL-MCNC: 1.57 MG/DL (ref 0.51–0.95)
CREAT SERPL-MCNC: 1.61 MG/DL (ref 0.51–0.95)
CREAT SERPL-MCNC: 1.64 MG/DL (ref 0.51–0.95)
CRP SERPL-MCNC: 40.3 MG/L
DEPRECATED HCO3 PLAS-SCNC: 22 MMOL/L (ref 22–29)
DEPRECATED HCO3 PLAS-SCNC: 26 MMOL/L (ref 22–29)
DEPRECATED HCO3 PLAS-SCNC: 26 MMOL/L (ref 22–29)
DIASTOLIC BLOOD PRESSURE - MUSE: NORMAL MMHG
EGFRCR SERPLBLD CKD-EPI 2021: 32 ML/MIN/1.73M2
EGFRCR SERPLBLD CKD-EPI 2021: 32 ML/MIN/1.73M2
EGFRCR SERPLBLD CKD-EPI 2021: 33 ML/MIN/1.73M2
EGFRCR SERPLBLD CKD-EPI 2021: 33 ML/MIN/1.73M2
EGFRCR SERPLBLD CKD-EPI 2021: 34 ML/MIN/1.73M2
EGFRCR SERPLBLD CKD-EPI 2021: 37 ML/MIN/1.73M2
EGFRCR SERPLBLD CKD-EPI 2021: 38 ML/MIN/1.73M2
EGFRCR SERPLBLD CKD-EPI 2021: 38 ML/MIN/1.73M2
EGFRCR SERPLBLD CKD-EPI 2021: 39 ML/MIN/1.73M2
EGFRCR SERPLBLD CKD-EPI 2021: 40 ML/MIN/1.73M2
EGFRCR SERPLBLD CKD-EPI 2021: 41 ML/MIN/1.73M2
EGFRCR SERPLBLD CKD-EPI 2021: 42 ML/MIN/1.73M2
EGFRCR SERPLBLD CKD-EPI 2021: 43 ML/MIN/1.73M2
EGFRCR SERPLBLD CKD-EPI 2021: 47 ML/MIN/1.73M2
EOSINOPHIL # BLD AUTO: 0 10E3/UL (ref 0–0.7)
EOSINOPHIL # BLD AUTO: 0.2 10E3/UL (ref 0–0.7)
EOSINOPHIL # BLD AUTO: 0.3 10E3/UL (ref 0–0.7)
EOSINOPHIL # BLD AUTO: 0.3 10E3/UL (ref 0–0.7)
EOSINOPHIL NFR BLD AUTO: 0 %
EOSINOPHIL NFR BLD AUTO: 2 %
EOSINOPHIL NFR BLD AUTO: 3 %
EOSINOPHIL NFR BLD AUTO: 4 %
ERYTHROCYTE [DISTWIDTH] IN BLOOD BY AUTOMATED COUNT: 13 % (ref 10–15)
ERYTHROCYTE [DISTWIDTH] IN BLOOD BY AUTOMATED COUNT: 13.1 % (ref 10–15)
ERYTHROCYTE [DISTWIDTH] IN BLOOD BY AUTOMATED COUNT: 13.2 % (ref 10–15)
ERYTHROCYTE [DISTWIDTH] IN BLOOD BY AUTOMATED COUNT: 13.4 % (ref 10–15)
ERYTHROCYTE [DISTWIDTH] IN BLOOD BY AUTOMATED COUNT: 14.1 % (ref 10–15)
ERYTHROCYTE [DISTWIDTH] IN BLOOD BY AUTOMATED COUNT: 14.4 % (ref 10–15)
ERYTHROCYTE [DISTWIDTH] IN BLOOD BY AUTOMATED COUNT: 14.6 % (ref 10–15)
FLUAV RNA SPEC QL NAA+PROBE: NEGATIVE
FLUAV RNA SPEC QL NAA+PROBE: NEGATIVE
FLUBV RNA RESP QL NAA+PROBE: NEGATIVE
FLUBV RNA RESP QL NAA+PROBE: NEGATIVE
FOLATE SERPL-MCNC: 6.8 NG/ML (ref 4.6–34.8)
GLUCOSE BLDC GLUCOMTR-MCNC: 124 MG/DL (ref 70–99)
GLUCOSE BLDC GLUCOMTR-MCNC: 128 MG/DL (ref 70–99)
GLUCOSE BLDC GLUCOMTR-MCNC: 129 MG/DL (ref 70–99)
GLUCOSE BLDC GLUCOMTR-MCNC: 132 MG/DL (ref 70–99)
GLUCOSE BLDC GLUCOMTR-MCNC: 135 MG/DL (ref 70–99)
GLUCOSE BLDC GLUCOMTR-MCNC: 141 MG/DL (ref 70–99)
GLUCOSE BLDC GLUCOMTR-MCNC: 141 MG/DL (ref 70–99)
GLUCOSE BLDC GLUCOMTR-MCNC: 142 MG/DL (ref 70–99)
GLUCOSE BLDC GLUCOMTR-MCNC: 143 MG/DL (ref 70–99)
GLUCOSE BLDC GLUCOMTR-MCNC: 143 MG/DL (ref 70–99)
GLUCOSE BLDC GLUCOMTR-MCNC: 144 MG/DL (ref 70–99)
GLUCOSE BLDC GLUCOMTR-MCNC: 145 MG/DL (ref 70–99)
GLUCOSE BLDC GLUCOMTR-MCNC: 146 MG/DL (ref 70–99)
GLUCOSE BLDC GLUCOMTR-MCNC: 148 MG/DL (ref 70–99)
GLUCOSE BLDC GLUCOMTR-MCNC: 149 MG/DL (ref 70–99)
GLUCOSE BLDC GLUCOMTR-MCNC: 150 MG/DL (ref 70–99)
GLUCOSE BLDC GLUCOMTR-MCNC: 151 MG/DL (ref 70–99)
GLUCOSE BLDC GLUCOMTR-MCNC: 152 MG/DL (ref 70–99)
GLUCOSE BLDC GLUCOMTR-MCNC: 152 MG/DL (ref 70–99)
GLUCOSE BLDC GLUCOMTR-MCNC: 153 MG/DL (ref 70–99)
GLUCOSE BLDC GLUCOMTR-MCNC: 154 MG/DL (ref 70–99)
GLUCOSE BLDC GLUCOMTR-MCNC: 156 MG/DL (ref 70–99)
GLUCOSE BLDC GLUCOMTR-MCNC: 157 MG/DL (ref 70–99)
GLUCOSE BLDC GLUCOMTR-MCNC: 158 MG/DL (ref 70–99)
GLUCOSE BLDC GLUCOMTR-MCNC: 159 MG/DL (ref 70–99)
GLUCOSE BLDC GLUCOMTR-MCNC: 161 MG/DL (ref 70–99)
GLUCOSE BLDC GLUCOMTR-MCNC: 162 MG/DL (ref 70–99)
GLUCOSE BLDC GLUCOMTR-MCNC: 163 MG/DL (ref 70–99)
GLUCOSE BLDC GLUCOMTR-MCNC: 163 MG/DL (ref 70–99)
GLUCOSE BLDC GLUCOMTR-MCNC: 165 MG/DL (ref 70–99)
GLUCOSE BLDC GLUCOMTR-MCNC: 166 MG/DL (ref 70–99)
GLUCOSE BLDC GLUCOMTR-MCNC: 166 MG/DL (ref 70–99)
GLUCOSE BLDC GLUCOMTR-MCNC: 169 MG/DL (ref 70–99)
GLUCOSE BLDC GLUCOMTR-MCNC: 169 MG/DL (ref 70–99)
GLUCOSE BLDC GLUCOMTR-MCNC: 170 MG/DL (ref 70–99)
GLUCOSE BLDC GLUCOMTR-MCNC: 171 MG/DL (ref 70–99)
GLUCOSE BLDC GLUCOMTR-MCNC: 173 MG/DL (ref 70–99)
GLUCOSE BLDC GLUCOMTR-MCNC: 174 MG/DL (ref 70–99)
GLUCOSE BLDC GLUCOMTR-MCNC: 176 MG/DL (ref 70–99)
GLUCOSE BLDC GLUCOMTR-MCNC: 178 MG/DL (ref 70–99)
GLUCOSE BLDC GLUCOMTR-MCNC: 179 MG/DL (ref 70–99)
GLUCOSE BLDC GLUCOMTR-MCNC: 181 MG/DL (ref 70–99)
GLUCOSE BLDC GLUCOMTR-MCNC: 184 MG/DL (ref 70–99)
GLUCOSE BLDC GLUCOMTR-MCNC: 185 MG/DL (ref 70–99)
GLUCOSE BLDC GLUCOMTR-MCNC: 186 MG/DL (ref 70–99)
GLUCOSE BLDC GLUCOMTR-MCNC: 187 MG/DL (ref 70–99)
GLUCOSE BLDC GLUCOMTR-MCNC: 190 MG/DL (ref 70–99)
GLUCOSE BLDC GLUCOMTR-MCNC: 192 MG/DL (ref 70–99)
GLUCOSE BLDC GLUCOMTR-MCNC: 193 MG/DL (ref 70–99)
GLUCOSE BLDC GLUCOMTR-MCNC: 193 MG/DL (ref 70–99)
GLUCOSE BLDC GLUCOMTR-MCNC: 194 MG/DL (ref 70–99)
GLUCOSE BLDC GLUCOMTR-MCNC: 198 MG/DL (ref 70–99)
GLUCOSE BLDC GLUCOMTR-MCNC: 201 MG/DL (ref 70–99)
GLUCOSE BLDC GLUCOMTR-MCNC: 207 MG/DL (ref 70–99)
GLUCOSE BLDC GLUCOMTR-MCNC: 209 MG/DL (ref 70–99)
GLUCOSE BLDC GLUCOMTR-MCNC: 209 MG/DL (ref 70–99)
GLUCOSE BLDC GLUCOMTR-MCNC: 210 MG/DL (ref 70–99)
GLUCOSE BLDC GLUCOMTR-MCNC: 211 MG/DL (ref 70–99)
GLUCOSE BLDC GLUCOMTR-MCNC: 213 MG/DL (ref 70–99)
GLUCOSE BLDC GLUCOMTR-MCNC: 224 MG/DL (ref 70–99)
GLUCOSE BLDC GLUCOMTR-MCNC: 227 MG/DL (ref 70–99)
GLUCOSE BLDC GLUCOMTR-MCNC: 227 MG/DL (ref 70–99)
GLUCOSE BLDC GLUCOMTR-MCNC: 230 MG/DL (ref 70–99)
GLUCOSE BLDC GLUCOMTR-MCNC: 236 MG/DL (ref 70–99)
GLUCOSE BLDC GLUCOMTR-MCNC: 239 MG/DL (ref 70–99)
GLUCOSE BLDC GLUCOMTR-MCNC: 246 MG/DL (ref 70–99)
GLUCOSE BLDC GLUCOMTR-MCNC: 248 MG/DL (ref 70–99)
GLUCOSE BLDC GLUCOMTR-MCNC: 263 MG/DL (ref 70–99)
GLUCOSE BLDC GLUCOMTR-MCNC: 275 MG/DL (ref 70–99)
GLUCOSE BLDC GLUCOMTR-MCNC: 279 MG/DL (ref 70–99)
GLUCOSE BLDC GLUCOMTR-MCNC: 282 MG/DL (ref 70–99)
GLUCOSE BLDC GLUCOMTR-MCNC: 285 MG/DL (ref 70–99)
GLUCOSE SERPL-MCNC: 111 MG/DL (ref 70–99)
GLUCOSE SERPL-MCNC: 124 MG/DL (ref 70–99)
GLUCOSE SERPL-MCNC: 138 MG/DL (ref 70–99)
GLUCOSE SERPL-MCNC: 145 MG/DL (ref 70–99)
GLUCOSE SERPL-MCNC: 145 MG/DL (ref 70–99)
GLUCOSE SERPL-MCNC: 146 MG/DL (ref 70–99)
GLUCOSE SERPL-MCNC: 149 MG/DL (ref 70–99)
GLUCOSE SERPL-MCNC: 149 MG/DL (ref 70–99)
GLUCOSE SERPL-MCNC: 150 MG/DL (ref 70–99)
GLUCOSE SERPL-MCNC: 152 MG/DL (ref 70–99)
GLUCOSE SERPL-MCNC: 155 MG/DL (ref 70–99)
GLUCOSE SERPL-MCNC: 157 MG/DL (ref 70–99)
GLUCOSE SERPL-MCNC: 157 MG/DL (ref 70–99)
GLUCOSE SERPL-MCNC: 163 MG/DL (ref 70–99)
GLUCOSE SERPL-MCNC: 164 MG/DL (ref 70–99)
GLUCOSE SERPL-MCNC: 169 MG/DL (ref 70–99)
GLUCOSE SERPL-MCNC: 195 MG/DL (ref 70–99)
GLUCOSE SERPL-MCNC: 209 MG/DL (ref 70–99)
GLUCOSE SERPL-MCNC: 217 MG/DL (ref 70–99)
GLUCOSE SERPL-MCNC: 220 MG/DL (ref 70–99)
GLUCOSE UR STRIP-MCNC: NEGATIVE MG/DL
HBA1C MFR BLD: 7.6 %
HCO3 BLDV-SCNC: 28 MMOL/L (ref 21–28)
HCO3 BLDV-SCNC: 29 MMOL/L (ref 21–28)
HCO3 BLDV-SCNC: 29 MMOL/L (ref 21–28)
HCO3 BLDV-SCNC: 30 MMOL/L (ref 21–28)
HCO3 BLDV-SCNC: 32 MMOL/L (ref 21–28)
HCO3 SERPL-SCNC: 21 MMOL/L (ref 22–29)
HCO3 SERPL-SCNC: 23 MMOL/L (ref 22–29)
HCO3 SERPL-SCNC: 24 MMOL/L (ref 22–29)
HCO3 SERPL-SCNC: 24 MMOL/L (ref 22–29)
HCO3 SERPL-SCNC: 25 MMOL/L (ref 22–29)
HCO3 SERPL-SCNC: 25 MMOL/L (ref 22–29)
HCO3 SERPL-SCNC: 26 MMOL/L (ref 22–29)
HCO3 SERPL-SCNC: 26 MMOL/L (ref 22–29)
HCO3 SERPL-SCNC: 27 MMOL/L (ref 22–29)
HCO3 SERPL-SCNC: 28 MMOL/L (ref 22–29)
HCO3 SERPL-SCNC: 28 MMOL/L (ref 22–29)
HCO3 SERPL-SCNC: 29 MMOL/L (ref 22–29)
HCO3 SERPL-SCNC: 29 MMOL/L (ref 22–29)
HCO3 SERPL-SCNC: 30 MMOL/L (ref 22–29)
HCT VFR BLD AUTO: 33.2 % (ref 35–47)
HCT VFR BLD AUTO: 33.8 % (ref 35–47)
HCT VFR BLD AUTO: 34.1 % (ref 35–47)
HCT VFR BLD AUTO: 34.2 % (ref 35–47)
HCT VFR BLD AUTO: 34.5 % (ref 35–47)
HCT VFR BLD AUTO: 34.6 % (ref 35–47)
HCT VFR BLD AUTO: 34.9 % (ref 35–47)
HCT VFR BLD AUTO: 36.1 % (ref 35–47)
HCT VFR BLD AUTO: 36.8 % (ref 35–47)
HCT VFR BLD AUTO: 37 % (ref 35–47)
HCT VFR BLD AUTO: 37.1 % (ref 35–47)
HCT VFR BLD AUTO: 38.2 % (ref 35–47)
HCT VFR BLD AUTO: 38.5 % (ref 35–47)
HCT VFR BLD AUTO: 39.1 % (ref 35–47)
HCT VFR BLD AUTO: 40.4 % (ref 35–47)
HCT VFR BLD AUTO: 41.3 % (ref 35–47)
HCT VFR BLD AUTO: 42.9 % (ref 35–47)
HGB BLD-MCNC: 10.7 G/DL (ref 11.7–15.7)
HGB BLD-MCNC: 11 G/DL (ref 11.7–15.7)
HGB BLD-MCNC: 11 G/DL (ref 11.7–15.7)
HGB BLD-MCNC: 11.1 G/DL (ref 11.7–15.7)
HGB BLD-MCNC: 11.4 G/DL (ref 11.7–15.7)
HGB BLD-MCNC: 11.8 G/DL (ref 11.7–15.7)
HGB BLD-MCNC: 12.1 G/DL (ref 11.7–15.7)
HGB BLD-MCNC: 12.4 G/DL (ref 11.7–15.7)
HGB BLD-MCNC: 12.5 G/DL (ref 11.7–15.7)
HGB BLD-MCNC: 12.9 G/DL (ref 11.7–15.7)
HGB BLD-MCNC: 13 G/DL (ref 11.7–15.7)
HGB BLD-MCNC: 13.9 G/DL (ref 11.7–15.7)
HGB UR QL STRIP: ABNORMAL
HGB UR QL STRIP: NEGATIVE
HGB UR QL STRIP: NEGATIVE
HOLD SPECIMEN: NORMAL
HYALINE CASTS: 1 /LPF
HYALINE CASTS: 1 /LPF
HYALINE CASTS: 5 /LPF
IMM GRANULOCYTES # BLD: 0 10E3/UL
IMM GRANULOCYTES # BLD: 0 10E3/UL
IMM GRANULOCYTES # BLD: 0.1 10E3/UL
IMM GRANULOCYTES # BLD: 0.1 10E3/UL
IMM GRANULOCYTES NFR BLD: 0 %
IMM GRANULOCYTES NFR BLD: 0 %
IMM GRANULOCYTES NFR BLD: 1 %
IMM GRANULOCYTES NFR BLD: 1 %
INR PPP: 0.96 (ref 0.85–1.15)
INTERPRETATION ECG - MUSE: NORMAL
KETONES UR STRIP-MCNC: 10 MG/DL
KETONES UR STRIP-MCNC: NEGATIVE MG/DL
LACTATE SERPL-SCNC: 0.9 MMOL/L (ref 0.7–2)
LEUKOCYTE ESTERASE UR QL STRIP: ABNORMAL
LVEF ECHO: NORMAL
LYMPHOCYTES # BLD AUTO: 1.4 10E3/UL (ref 0.8–5.3)
LYMPHOCYTES # BLD AUTO: 1.5 10E3/UL (ref 0.8–5.3)
LYMPHOCYTES # BLD AUTO: 1.6 10E3/UL (ref 0.8–5.3)
LYMPHOCYTES # BLD AUTO: 2 10E3/UL (ref 0.8–5.3)
LYMPHOCYTES NFR BLD AUTO: 16 %
LYMPHOCYTES NFR BLD AUTO: 19 %
LYMPHOCYTES NFR BLD AUTO: 19 %
LYMPHOCYTES NFR BLD AUTO: 21 %
MAGNESIUM SERPL-MCNC: 1.5 MG/DL (ref 1.7–2.3)
MAGNESIUM SERPL-MCNC: 1.6 MG/DL (ref 1.7–2.3)
MAGNESIUM SERPL-MCNC: 1.9 MG/DL (ref 1.7–2.3)
MCH RBC QN AUTO: 28 PG (ref 26.5–33)
MCH RBC QN AUTO: 28 PG (ref 26.5–33)
MCH RBC QN AUTO: 28.2 PG (ref 26.5–33)
MCH RBC QN AUTO: 28.4 PG (ref 26.5–33)
MCH RBC QN AUTO: 28.6 PG (ref 26.5–33)
MCH RBC QN AUTO: 28.7 PG (ref 26.5–33)
MCH RBC QN AUTO: 28.8 PG (ref 26.5–33)
MCH RBC QN AUTO: 28.8 PG (ref 26.5–33)
MCH RBC QN AUTO: 28.9 PG (ref 26.5–33)
MCH RBC QN AUTO: 29 PG (ref 26.5–33)
MCH RBC QN AUTO: 29 PG (ref 26.5–33)
MCH RBC QN AUTO: 29.1 PG (ref 26.5–33)
MCH RBC QN AUTO: 29.1 PG (ref 26.5–33)
MCHC RBC AUTO-ENTMCNC: 30.9 G/DL (ref 31.5–36.5)
MCHC RBC AUTO-ENTMCNC: 31.2 G/DL (ref 31.5–36.5)
MCHC RBC AUTO-ENTMCNC: 31.6 G/DL (ref 31.5–36.5)
MCHC RBC AUTO-ENTMCNC: 31.8 G/DL (ref 31.5–36.5)
MCHC RBC AUTO-ENTMCNC: 31.8 G/DL (ref 31.5–36.5)
MCHC RBC AUTO-ENTMCNC: 31.9 G/DL (ref 31.5–36.5)
MCHC RBC AUTO-ENTMCNC: 32.1 G/DL (ref 31.5–36.5)
MCHC RBC AUTO-ENTMCNC: 32.1 G/DL (ref 31.5–36.5)
MCHC RBC AUTO-ENTMCNC: 32.2 G/DL (ref 31.5–36.5)
MCHC RBC AUTO-ENTMCNC: 32.3 G/DL (ref 31.5–36.5)
MCHC RBC AUTO-ENTMCNC: 32.4 G/DL (ref 31.5–36.5)
MCHC RBC AUTO-ENTMCNC: 32.7 G/DL (ref 31.5–36.5)
MCHC RBC AUTO-ENTMCNC: 32.8 G/DL (ref 31.5–36.5)
MCV RBC AUTO: 87 FL (ref 78–100)
MCV RBC AUTO: 88 FL (ref 78–100)
MCV RBC AUTO: 88 FL (ref 78–100)
MCV RBC AUTO: 89 FL (ref 78–100)
MCV RBC AUTO: 90 FL (ref 78–100)
MCV RBC AUTO: 91 FL (ref 78–100)
MCV RBC AUTO: 91 FL (ref 78–100)
MONOCYTES # BLD AUTO: 0.6 10E3/UL (ref 0–1.3)
MONOCYTES # BLD AUTO: 0.6 10E3/UL (ref 0–1.3)
MONOCYTES # BLD AUTO: 0.7 10E3/UL (ref 0–1.3)
MONOCYTES # BLD AUTO: 0.9 10E3/UL (ref 0–1.3)
MONOCYTES NFR BLD AUTO: 6 %
MONOCYTES NFR BLD AUTO: 8 %
MONOCYTES NFR BLD AUTO: 9 %
MONOCYTES NFR BLD AUTO: 9 %
MUCOUS THREADS #/AREA URNS LPF: PRESENT /LPF
NEUTROPHILS # BLD AUTO: 4.8 10E3/UL (ref 1.6–8.3)
NEUTROPHILS # BLD AUTO: 5.6 10E3/UL (ref 1.6–8.3)
NEUTROPHILS # BLD AUTO: 7 10E3/UL (ref 1.6–8.3)
NEUTROPHILS # BLD AUTO: 7.5 10E3/UL (ref 1.6–8.3)
NEUTROPHILS NFR BLD AUTO: 65 %
NEUTROPHILS NFR BLD AUTO: 67 %
NEUTROPHILS NFR BLD AUTO: 73 %
NEUTROPHILS NFR BLD AUTO: 74 %
NITRATE UR QL: NEGATIVE
NRBC # BLD AUTO: 0 10E3/UL
NRBC BLD AUTO-RTO: 0 /100
NT-PROBNP SERPL-MCNC: 7631 PG/ML (ref 0–1800)
NT-PROBNP SERPL-MCNC: ABNORMAL PG/ML (ref 0–1800)
O2/TOTAL GAS SETTING VFR VENT: 21 %
O2/TOTAL GAS SETTING VFR VENT: 25 %
O2/TOTAL GAS SETTING VFR VENT: 30 %
OXYHGB MFR BLDV: 29 % (ref 70–75)
OXYHGB MFR BLDV: 46 % (ref 70–75)
OXYHGB MFR BLDV: 49 % (ref 70–75)
OXYHGB MFR BLDV: 51 % (ref 70–75)
OXYHGB MFR BLDV: 75 % (ref 70–75)
P AXIS - MUSE: 34 DEGREES
P AXIS - MUSE: 50 DEGREES
P AXIS - MUSE: 94 DEGREES
P AXIS - MUSE: NORMAL DEGREES
PCO2 BLDV: 43 MM HG (ref 40–50)
PCO2 BLDV: 50 MM HG (ref 40–50)
PCO2 BLDV: 52 MM HG (ref 40–50)
PCO2 BLDV: 53 MM HG (ref 40–50)
PCO2 BLDV: 57 MM HG (ref 40–50)
PH BLDV: 7.31 [PH] (ref 7.32–7.43)
PH BLDV: 7.34 [PH] (ref 7.32–7.43)
PH BLDV: 7.38 [PH] (ref 7.32–7.43)
PH BLDV: 7.4 [PH] (ref 7.32–7.43)
PH BLDV: 7.45 [PH] (ref 7.32–7.43)
PH UR STRIP: 5 [PH] (ref 5–7)
PH UR STRIP: 5.5 [PH] (ref 5–7)
PH UR STRIP: 6 [PH] (ref 5–7)
PHOSPHATE SERPL-MCNC: 3.2 MG/DL (ref 2.5–4.5)
PLATELET # BLD AUTO: 137 10E3/UL (ref 150–450)
PLATELET # BLD AUTO: 138 10E3/UL (ref 150–450)
PLATELET # BLD AUTO: 148 10E3/UL (ref 150–450)
PLATELET # BLD AUTO: 155 10E3/UL (ref 150–450)
PLATELET # BLD AUTO: 159 10E3/UL (ref 150–450)
PLATELET # BLD AUTO: 159 10E3/UL (ref 150–450)
PLATELET # BLD AUTO: 162 10E3/UL (ref 150–450)
PLATELET # BLD AUTO: 163 10E3/UL (ref 150–450)
PLATELET # BLD AUTO: 164 10E3/UL (ref 150–450)
PLATELET # BLD AUTO: 164 10E3/UL (ref 150–450)
PLATELET # BLD AUTO: 169 10E3/UL (ref 150–450)
PLATELET # BLD AUTO: 173 10E3/UL (ref 150–450)
PLATELET # BLD AUTO: 174 10E3/UL (ref 150–450)
PLATELET # BLD AUTO: 174 10E3/UL (ref 150–450)
PLATELET # BLD AUTO: 175 10E3/UL (ref 150–450)
PLATELET # BLD AUTO: 176 10E3/UL (ref 150–450)
PLATELET # BLD AUTO: 181 10E3/UL (ref 150–450)
PLATELET # BLD AUTO: 223 10E3/UL (ref 150–450)
PO2 BLDV: 20 MM HG (ref 25–47)
PO2 BLDV: 28 MM HG (ref 25–47)
PO2 BLDV: 28 MM HG (ref 25–47)
PO2 BLDV: 29 MM HG (ref 25–47)
PO2 BLDV: 41 MM HG (ref 25–47)
POTASSIUM SERPL-SCNC: 3.5 MMOL/L (ref 3.4–5.3)
POTASSIUM SERPL-SCNC: 3.5 MMOL/L (ref 3.4–5.3)
POTASSIUM SERPL-SCNC: 3.6 MMOL/L (ref 3.4–5.3)
POTASSIUM SERPL-SCNC: 3.7 MMOL/L (ref 3.4–5.3)
POTASSIUM SERPL-SCNC: 3.8 MMOL/L (ref 3.4–5.3)
POTASSIUM SERPL-SCNC: 3.9 MMOL/L (ref 3.4–5.3)
POTASSIUM SERPL-SCNC: 3.9 MMOL/L (ref 3.4–5.3)
POTASSIUM SERPL-SCNC: 4.1 MMOL/L (ref 3.4–5.3)
POTASSIUM SERPL-SCNC: 4.2 MMOL/L (ref 3.4–5.3)
POTASSIUM SERPL-SCNC: 4.2 MMOL/L (ref 3.4–5.3)
POTASSIUM SERPL-SCNC: 4.3 MMOL/L (ref 3.4–5.3)
POTASSIUM SERPL-SCNC: 4.4 MMOL/L (ref 3.4–5.3)
POTASSIUM SERPL-SCNC: 4.4 MMOL/L (ref 3.4–5.3)
POTASSIUM SERPL-SCNC: 4.5 MMOL/L (ref 3.4–5.3)
POTASSIUM SERPL-SCNC: 4.6 MMOL/L (ref 3.4–5.3)
POTASSIUM SERPL-SCNC: 4.6 MMOL/L (ref 3.4–5.3)
PR INTERVAL - MUSE: 164 MS
PR INTERVAL - MUSE: 180 MS
PR INTERVAL - MUSE: 180 MS
PR INTERVAL - MUSE: 182 MS
PROCALCITONIN SERPL IA-MCNC: 0.08 NG/ML
PROT SERPL-MCNC: 6.1 G/DL (ref 6.4–8.3)
PROT SERPL-MCNC: 6.5 G/DL (ref 6.4–8.3)
PROT SERPL-MCNC: 6.5 G/DL (ref 6.4–8.3)
PROT SERPL-MCNC: 6.7 G/DL (ref 6.4–8.3)
PROT SERPL-MCNC: 6.8 G/DL (ref 6.4–8.3)
QRS DURATION - MUSE: 100 MS
QRS DURATION - MUSE: 106 MS
QRS DURATION - MUSE: 106 MS
QRS DURATION - MUSE: 92 MS
QT - MUSE: 442 MS
QT - MUSE: 444 MS
QT - MUSE: 452 MS
QT - MUSE: 482 MS
QTC - MUSE: 469 MS
QTC - MUSE: 477 MS
QTC - MUSE: 552 MS
QTC - MUSE: 558 MS
R AXIS - MUSE: -18 DEGREES
R AXIS - MUSE: 14 DEGREES
R AXIS - MUSE: 17 DEGREES
R AXIS - MUSE: 87 DEGREES
RBC # BLD AUTO: 3.77 10E6/UL (ref 3.8–5.2)
RBC # BLD AUTO: 3.82 10E6/UL (ref 3.8–5.2)
RBC # BLD AUTO: 3.83 10E6/UL (ref 3.8–5.2)
RBC # BLD AUTO: 3.83 10E6/UL (ref 3.8–5.2)
RBC # BLD AUTO: 3.84 10E6/UL (ref 3.8–5.2)
RBC # BLD AUTO: 3.86 10E6/UL (ref 3.8–5.2)
RBC # BLD AUTO: 3.87 10E6/UL (ref 3.8–5.2)
RBC # BLD AUTO: 3.99 10E6/UL (ref 3.8–5.2)
RBC # BLD AUTO: 4.16 10E6/UL (ref 3.8–5.2)
RBC # BLD AUTO: 4.16 10E6/UL (ref 3.8–5.2)
RBC # BLD AUTO: 4.21 10E6/UL (ref 3.8–5.2)
RBC # BLD AUTO: 4.29 10E6/UL (ref 3.8–5.2)
RBC # BLD AUTO: 4.29 10E6/UL (ref 3.8–5.2)
RBC # BLD AUTO: 4.3 10E6/UL (ref 3.8–5.2)
RBC # BLD AUTO: 4.54 10E6/UL (ref 3.8–5.2)
RBC # BLD AUTO: 4.61 10E6/UL (ref 3.8–5.2)
RBC # BLD AUTO: 4.78 10E6/UL (ref 3.8–5.2)
RBC URINE: 1 /HPF
RBC URINE: 1 /HPF
RBC URINE: 20 /HPF
RBC URINE: 4 /HPF
RBC URINE: 5 /HPF
RBC URINE: <1 /HPF
RSV RNA SPEC NAA+PROBE: NEGATIVE
RSV RNA SPEC NAA+PROBE: NEGATIVE
SAO2 % BLDV: 29.2 % (ref 70–75)
SAO2 % BLDV: 47 % (ref 70–75)
SAO2 % BLDV: 49.8 % (ref 70–75)
SAO2 % BLDV: 52.5 % (ref 70–75)
SAO2 % BLDV: 76.6 % (ref 70–75)
SARS-COV-2 RNA RESP QL NAA+PROBE: NEGATIVE
SARS-COV-2 RNA RESP QL NAA+PROBE: POSITIVE
SODIUM SERPL-SCNC: 135 MMOL/L (ref 135–145)
SODIUM SERPL-SCNC: 136 MMOL/L (ref 135–145)
SODIUM SERPL-SCNC: 137 MMOL/L (ref 135–145)
SODIUM SERPL-SCNC: 137 MMOL/L (ref 135–145)
SODIUM SERPL-SCNC: 138 MMOL/L (ref 135–145)
SODIUM SERPL-SCNC: 138 MMOL/L (ref 135–145)
SODIUM SERPL-SCNC: 139 MMOL/L (ref 135–145)
SODIUM SERPL-SCNC: 139 MMOL/L (ref 135–145)
SODIUM SERPL-SCNC: 140 MMOL/L (ref 135–145)
SODIUM SERPL-SCNC: 141 MMOL/L (ref 135–145)
SODIUM SERPL-SCNC: 141 MMOL/L (ref 135–145)
SODIUM SERPL-SCNC: 142 MMOL/L (ref 135–145)
SODIUM SERPL-SCNC: 142 MMOL/L (ref 135–145)
SODIUM SERPL-SCNC: 143 MMOL/L (ref 135–145)
SODIUM SERPL-SCNC: 143 MMOL/L (ref 135–145)
SODIUM SERPL-SCNC: 144 MMOL/L (ref 135–145)
SODIUM SERPL-SCNC: 146 MMOL/L (ref 135–145)
SP GR UR STRIP: 1.01 (ref 1–1.03)
SQUAMOUS EPITHELIAL: 1 /HPF
SQUAMOUS EPITHELIAL: <1 /HPF
SYSTOLIC BLOOD PRESSURE - MUSE: NORMAL MMHG
T AXIS - MUSE: 121 DEGREES
T AXIS - MUSE: 129 DEGREES
T AXIS - MUSE: 132 DEGREES
T AXIS - MUSE: 149 DEGREES
TRANSITIONAL EPI: 1 /HPF
TRANSITIONAL EPI: <1 /HPF
TROPONIN T SERPL HS-MCNC: 107 NG/L
TROPONIN T SERPL HS-MCNC: 116 NG/L
TROPONIN T SERPL HS-MCNC: 53 NG/L
TROPONIN T SERPL HS-MCNC: 62 NG/L
TROPONIN T SERPL HS-MCNC: 79 NG/L
TSH SERPL DL<=0.005 MIU/L-ACNC: 0.52 UIU/ML (ref 0.3–4.2)
TSH SERPL DL<=0.005 MIU/L-ACNC: 1.18 UIU/ML (ref 0.3–4.2)
URATE SERPL-MCNC: 5.3 MG/DL (ref 2.4–5.7)
UROBILINOGEN UR STRIP-MCNC: 6 MG/DL
UROBILINOGEN UR STRIP-MCNC: <2 MG/DL
UROBILINOGEN UR STRIP-MCNC: NORMAL MG/DL
VENTRICULAR RATE- MUSE: 59 BPM
VENTRICULAR RATE- MUSE: 67 BPM
VENTRICULAR RATE- MUSE: 90 BPM
VENTRICULAR RATE- MUSE: 96 BPM
VIT B12 SERPL-MCNC: 507 PG/ML (ref 232–1245)
VIT B12 SERPL-MCNC: 525 PG/ML (ref 232–1245)
WBC # BLD AUTO: 10 10E3/UL (ref 4–11)
WBC # BLD AUTO: 10.4 10E3/UL (ref 4–11)
WBC # BLD AUTO: 3.9 10E3/UL (ref 4–11)
WBC # BLD AUTO: 6.3 10E3/UL (ref 4–11)
WBC # BLD AUTO: 6.3 10E3/UL (ref 4–11)
WBC # BLD AUTO: 6.5 10E3/UL (ref 4–11)
WBC # BLD AUTO: 6.6 10E3/UL (ref 4–11)
WBC # BLD AUTO: 6.7 10E3/UL (ref 4–11)
WBC # BLD AUTO: 6.8 10E3/UL (ref 4–11)
WBC # BLD AUTO: 7 10E3/UL (ref 4–11)
WBC # BLD AUTO: 7.1 10E3/UL (ref 4–11)
WBC # BLD AUTO: 7.3 10E3/UL (ref 4–11)
WBC # BLD AUTO: 7.3 10E3/UL (ref 4–11)
WBC # BLD AUTO: 7.6 10E3/UL (ref 4–11)
WBC # BLD AUTO: 8.2 10E3/UL (ref 4–11)
WBC # BLD AUTO: 8.5 10E3/UL (ref 4–11)
WBC # BLD AUTO: 9.2 10E3/UL (ref 4–11)
WBC CLUMPS #/AREA URNS HPF: PRESENT /HPF
WBC URINE: 105 /HPF
WBC URINE: 12 /HPF
WBC URINE: 167 /HPF
WBC URINE: 5 /HPF
WBC URINE: 5 /HPF
WBC URINE: >182 /HPF

## 2024-01-01 PROCEDURE — 36415 COLL VENOUS BLD VENIPUNCTURE: CPT | Performed by: STUDENT IN AN ORGANIZED HEALTH CARE EDUCATION/TRAINING PROGRAM

## 2024-01-01 PROCEDURE — 120N000002 HC R&B MED SURG/OB UMMC

## 2024-01-01 PROCEDURE — 250N000011 HC RX IP 250 OP 636: Performed by: INTERNAL MEDICINE

## 2024-01-01 PROCEDURE — 93010 ELECTROCARDIOGRAM REPORT: CPT | Performed by: INTERNAL MEDICINE

## 2024-01-01 PROCEDURE — 85027 COMPLETE CBC AUTOMATED: CPT

## 2024-01-01 PROCEDURE — 93264 REM MNTR WRLS P-ART PRS SNR: CPT | Performed by: NURSE PRACTITIONER

## 2024-01-01 PROCEDURE — 250N000011 HC RX IP 250 OP 636

## 2024-01-01 PROCEDURE — 99285 EMERGENCY DEPT VISIT HI MDM: CPT | Mod: 25 | Performed by: EMERGENCY MEDICINE

## 2024-01-01 PROCEDURE — 999N000248 HC STATISTIC IV INSERT WITH US BY RN

## 2024-01-01 PROCEDURE — 99232 SBSQ HOSP IP/OBS MODERATE 35: CPT | Performed by: STUDENT IN AN ORGANIZED HEALTH CARE EDUCATION/TRAINING PROGRAM

## 2024-01-01 PROCEDURE — 250N000013 HC RX MED GY IP 250 OP 250 PS 637: Performed by: PHYSICIAN ASSISTANT

## 2024-01-01 PROCEDURE — 999N000111 HC STATISTIC OT IP EVAL DEFER

## 2024-01-01 PROCEDURE — 76857 US EXAM PELVIC LIMITED: CPT | Mod: 26 | Performed by: STUDENT IN AN ORGANIZED HEALTH CARE EDUCATION/TRAINING PROGRAM

## 2024-01-01 PROCEDURE — 250N000013 HC RX MED GY IP 250 OP 250 PS 637: Performed by: STUDENT IN AN ORGANIZED HEALTH CARE EDUCATION/TRAINING PROGRAM

## 2024-01-01 PROCEDURE — 93321 DOPPLER ECHO F-UP/LMTD STD: CPT | Mod: 26 | Performed by: INTERNAL MEDICINE

## 2024-01-01 PROCEDURE — 250N000009 HC RX 250

## 2024-01-01 PROCEDURE — 36415 COLL VENOUS BLD VENIPUNCTURE: CPT | Performed by: PHYSICIAN ASSISTANT

## 2024-01-01 PROCEDURE — 82607 VITAMIN B-12: CPT

## 2024-01-01 PROCEDURE — 250N000013 HC RX MED GY IP 250 OP 250 PS 637

## 2024-01-01 PROCEDURE — 87086 URINE CULTURE/COLONY COUNT: CPT | Performed by: STUDENT IN AN ORGANIZED HEALTH CARE EDUCATION/TRAINING PROGRAM

## 2024-01-01 PROCEDURE — 82565 ASSAY OF CREATININE: CPT

## 2024-01-01 PROCEDURE — 99222 1ST HOSP IP/OBS MODERATE 55: CPT | Mod: 25 | Performed by: NURSE PRACTITIONER

## 2024-01-01 PROCEDURE — 36591 DRAW BLOOD OFF VENOUS DEVICE: CPT

## 2024-01-01 PROCEDURE — 82805 BLOOD GASES W/O2 SATURATION: CPT | Performed by: STUDENT IN AN ORGANIZED HEALTH CARE EDUCATION/TRAINING PROGRAM

## 2024-01-01 PROCEDURE — 255N000002 HC RX 255 OP 636: Performed by: INTERNAL MEDICINE

## 2024-01-01 PROCEDURE — 999N000208 ECHOCARDIOGRAM COMPLETE

## 2024-01-01 PROCEDURE — 258N000003 HC RX IP 258 OP 636: Performed by: INTERNAL MEDICINE

## 2024-01-01 PROCEDURE — 81001 URINALYSIS AUTO W/SCOPE: CPT | Performed by: PHYSICIAN ASSISTANT

## 2024-01-01 PROCEDURE — 93325 DOPPLER ECHO COLOR FLOW MAPG: CPT | Mod: 26 | Performed by: INTERNAL MEDICINE

## 2024-01-01 PROCEDURE — 93005 ELECTROCARDIOGRAM TRACING: CPT

## 2024-01-01 PROCEDURE — 84100 ASSAY OF PHOSPHORUS: CPT | Performed by: PHYSICIAN ASSISTANT

## 2024-01-01 PROCEDURE — 250N000012 HC RX MED GY IP 250 OP 636 PS 637: Performed by: PHYSICIAN ASSISTANT

## 2024-01-01 PROCEDURE — 87040 BLOOD CULTURE FOR BACTERIA: CPT | Performed by: PHYSICIAN ASSISTANT

## 2024-01-01 PROCEDURE — G0179 MD RECERTIFICATION HHA PT: HCPCS | Performed by: NURSE PRACTITIONER

## 2024-01-01 PROCEDURE — 85048 AUTOMATED LEUKOCYTE COUNT: CPT | Performed by: STUDENT IN AN ORGANIZED HEALTH CARE EDUCATION/TRAINING PROGRAM

## 2024-01-01 PROCEDURE — 258N000003 HC RX IP 258 OP 636: Performed by: STUDENT IN AN ORGANIZED HEALTH CARE EDUCATION/TRAINING PROGRAM

## 2024-01-01 PROCEDURE — 70450 CT HEAD/BRAIN W/O DYE: CPT | Mod: 26 | Performed by: RADIOLOGY

## 2024-01-01 PROCEDURE — 250N000013 HC RX MED GY IP 250 OP 250 PS 637: Performed by: INTERNAL MEDICINE

## 2024-01-01 PROCEDURE — 99232 SBSQ HOSP IP/OBS MODERATE 35: CPT | Performed by: INTERNAL MEDICINE

## 2024-01-01 PROCEDURE — 99233 SBSQ HOSP IP/OBS HIGH 50: CPT | Performed by: STUDENT IN AN ORGANIZED HEALTH CARE EDUCATION/TRAINING PROGRAM

## 2024-01-01 PROCEDURE — 93010 ELECTROCARDIOGRAM REPORT: CPT | Performed by: EMERGENCY MEDICINE

## 2024-01-01 PROCEDURE — 82040 ASSAY OF SERUM ALBUMIN: CPT

## 2024-01-01 PROCEDURE — 87086 URINE CULTURE/COLONY COUNT: CPT | Mod: ORL | Performed by: NURSE PRACTITIONER

## 2024-01-01 PROCEDURE — 93005 ELECTROCARDIOGRAM TRACING: CPT | Performed by: EMERGENCY MEDICINE

## 2024-01-01 PROCEDURE — 84484 ASSAY OF TROPONIN QUANT: CPT | Performed by: EMERGENCY MEDICINE

## 2024-01-01 PROCEDURE — G1010 CDSM STANSON: HCPCS | Performed by: RADIOLOGY

## 2024-01-01 PROCEDURE — 250N000011 HC RX IP 250 OP 636: Performed by: PHYSICIAN ASSISTANT

## 2024-01-01 PROCEDURE — 82374 ASSAY BLOOD CARBON DIOXIDE: CPT | Performed by: STUDENT IN AN ORGANIZED HEALTH CARE EDUCATION/TRAINING PROGRAM

## 2024-01-01 PROCEDURE — 99285 EMERGENCY DEPT VISIT HI MDM: CPT | Mod: FS | Performed by: EMERGENCY MEDICINE

## 2024-01-01 PROCEDURE — 93264 REM MNTR WRLS P-ART PRS SNR: CPT

## 2024-01-01 PROCEDURE — 80053 COMPREHEN METABOLIC PANEL: CPT | Performed by: PHYSICIAN ASSISTANT

## 2024-01-01 PROCEDURE — 36415 COLL VENOUS BLD VENIPUNCTURE: CPT | Performed by: INTERNAL MEDICINE

## 2024-01-01 PROCEDURE — 73030 X-RAY EXAM OF SHOULDER: CPT | Mod: 26 | Performed by: RADIOLOGY

## 2024-01-01 PROCEDURE — 80048 BASIC METABOLIC PNL TOTAL CA: CPT | Mod: ORL | Performed by: NURSE PRACTITIONER

## 2024-01-01 PROCEDURE — 84443 ASSAY THYROID STIM HORMONE: CPT | Performed by: PHYSICIAN ASSISTANT

## 2024-01-01 PROCEDURE — 82746 ASSAY OF FOLIC ACID SERUM: CPT

## 2024-01-01 PROCEDURE — 80048 BASIC METABOLIC PNL TOTAL CA: CPT | Performed by: STUDENT IN AN ORGANIZED HEALTH CARE EDUCATION/TRAINING PROGRAM

## 2024-01-01 PROCEDURE — 70450 CT HEAD/BRAIN W/O DYE: CPT | Mod: MG

## 2024-01-01 PROCEDURE — 83880 ASSAY OF NATRIURETIC PEPTIDE: CPT | Performed by: PHYSICIAN ASSISTANT

## 2024-01-01 PROCEDURE — 97530 THERAPEUTIC ACTIVITIES: CPT | Mod: GP

## 2024-01-01 PROCEDURE — 93306 TTE W/DOPPLER COMPLETE: CPT | Mod: 26 | Performed by: INTERNAL MEDICINE

## 2024-01-01 PROCEDURE — 99207 PR NO BILLABLE SERVICE THIS VISIT: CPT | Performed by: STUDENT IN AN ORGANIZED HEALTH CARE EDUCATION/TRAINING PROGRAM

## 2024-01-01 PROCEDURE — 250N000012 HC RX MED GY IP 250 OP 636 PS 637: Performed by: STUDENT IN AN ORGANIZED HEALTH CARE EDUCATION/TRAINING PROGRAM

## 2024-01-01 PROCEDURE — 83605 ASSAY OF LACTIC ACID: CPT | Performed by: PHYSICIAN ASSISTANT

## 2024-01-01 PROCEDURE — 86140 C-REACTIVE PROTEIN: CPT | Performed by: PHYSICIAN ASSISTANT

## 2024-01-01 PROCEDURE — 83735 ASSAY OF MAGNESIUM: CPT | Performed by: PHYSICIAN ASSISTANT

## 2024-01-01 PROCEDURE — 81001 URINALYSIS AUTO W/SCOPE: CPT | Performed by: STUDENT IN AN ORGANIZED HEALTH CARE EDUCATION/TRAINING PROGRAM

## 2024-01-01 PROCEDURE — 71045 X-RAY EXAM CHEST 1 VIEW: CPT | Mod: 26 | Performed by: RADIOLOGY

## 2024-01-01 PROCEDURE — 99207 PR NO BILLABLE SERVICE THIS VISIT: CPT | Performed by: PHYSICIAN ASSISTANT

## 2024-01-01 PROCEDURE — 83036 HEMOGLOBIN GLYCOSYLATED A1C: CPT | Performed by: STUDENT IN AN ORGANIZED HEALTH CARE EDUCATION/TRAINING PROGRAM

## 2024-01-01 PROCEDURE — 87637 SARSCOV2&INF A&B&RSV AMP PRB: CPT | Performed by: PHYSICIAN ASSISTANT

## 2024-01-01 PROCEDURE — 71045 X-RAY EXAM CHEST 1 VIEW: CPT

## 2024-01-01 PROCEDURE — 97161 PT EVAL LOW COMPLEX 20 MIN: CPT | Mod: GP

## 2024-01-01 PROCEDURE — 85027 COMPLETE CBC AUTOMATED: CPT | Performed by: STUDENT IN AN ORGANIZED HEALTH CARE EDUCATION/TRAINING PROGRAM

## 2024-01-01 PROCEDURE — 82805 BLOOD GASES W/O2 SATURATION: CPT | Performed by: PHYSICIAN ASSISTANT

## 2024-01-01 PROCEDURE — 84484 ASSAY OF TROPONIN QUANT: CPT | Performed by: PHYSICIAN ASSISTANT

## 2024-01-01 PROCEDURE — 87186 SC STD MICRODIL/AGAR DIL: CPT | Performed by: PHYSICIAN ASSISTANT

## 2024-01-01 PROCEDURE — 250N000011 HC RX IP 250 OP 636: Mod: JZ | Performed by: INTERNAL MEDICINE

## 2024-01-01 PROCEDURE — 250N000011 HC RX IP 250 OP 636: Performed by: EMERGENCY MEDICINE

## 2024-01-01 PROCEDURE — 99213 OFFICE O/P EST LOW 20 MIN: CPT | Mod: 95 | Performed by: INTERNAL MEDICINE

## 2024-01-01 PROCEDURE — 250N000011 HC RX IP 250 OP 636: Performed by: STUDENT IN AN ORGANIZED HEALTH CARE EDUCATION/TRAINING PROGRAM

## 2024-01-01 PROCEDURE — 99215 OFFICE O/P EST HI 40 MIN: CPT | Mod: 95 | Performed by: NURSE PRACTITIONER

## 2024-01-01 PROCEDURE — 99215 OFFICE O/P EST HI 40 MIN: CPT | Mod: 95 | Performed by: STUDENT IN AN ORGANIZED HEALTH CARE EDUCATION/TRAINING PROGRAM

## 2024-01-01 PROCEDURE — 84550 ASSAY OF BLOOD/URIC ACID: CPT | Performed by: STUDENT IN AN ORGANIZED HEALTH CARE EDUCATION/TRAINING PROGRAM

## 2024-01-01 PROCEDURE — 85025 COMPLETE CBC W/AUTO DIFF WBC: CPT | Performed by: PHYSICIAN ASSISTANT

## 2024-01-01 PROCEDURE — 87186 SC STD MICRODIL/AGAR DIL: CPT | Performed by: STUDENT IN AN ORGANIZED HEALTH CARE EDUCATION/TRAINING PROGRAM

## 2024-01-01 PROCEDURE — 36415 COLL VENOUS BLD VENIPUNCTURE: CPT

## 2024-01-01 PROCEDURE — 99207 PR NO CHARGE LOS: CPT | Mod: 95 | Performed by: PHARMACIST

## 2024-01-01 PROCEDURE — 84155 ASSAY OF PROTEIN SERUM: CPT | Performed by: PHYSICIAN ASSISTANT

## 2024-01-01 PROCEDURE — 70551 MRI BRAIN STEM W/O DYE: CPT | Mod: MG

## 2024-01-01 PROCEDURE — 80048 BASIC METABOLIC PNL TOTAL CA: CPT | Performed by: INTERNAL MEDICINE

## 2024-01-01 PROCEDURE — 97530 THERAPEUTIC ACTIVITIES: CPT | Mod: GP | Performed by: PHYSICAL THERAPIST

## 2024-01-01 PROCEDURE — G2211 COMPLEX E/M VISIT ADD ON: HCPCS | Mod: 95 | Performed by: STUDENT IN AN ORGANIZED HEALTH CARE EDUCATION/TRAINING PROGRAM

## 2024-01-01 PROCEDURE — 73030 X-RAY EXAM OF SHOULDER: CPT | Mod: LT

## 2024-01-01 PROCEDURE — 82565 ASSAY OF CREATININE: CPT | Performed by: STUDENT IN AN ORGANIZED HEALTH CARE EDUCATION/TRAINING PROGRAM

## 2024-01-01 PROCEDURE — 70547 MR ANGIOGRAPHY NECK W/O DYE: CPT | Mod: MG

## 2024-01-01 PROCEDURE — 36415 COLL VENOUS BLD VENIPUNCTURE: CPT | Performed by: EMERGENCY MEDICINE

## 2024-01-01 PROCEDURE — 82040 ASSAY OF SERUM ALBUMIN: CPT | Mod: ORL | Performed by: NURSE PRACTITIONER

## 2024-01-01 PROCEDURE — 93308 TTE F-UP OR LMTD: CPT | Mod: 26 | Performed by: INTERNAL MEDICINE

## 2024-01-01 PROCEDURE — 258N000003 HC RX IP 258 OP 636: Performed by: PHYSICIAN ASSISTANT

## 2024-01-01 PROCEDURE — 74176 CT ABD & PELVIS W/O CONTRAST: CPT | Mod: MG

## 2024-01-01 PROCEDURE — 99233 SBSQ HOSP IP/OBS HIGH 50: CPT | Mod: 25 | Performed by: NURSE PRACTITIONER

## 2024-01-01 PROCEDURE — 99222 1ST HOSP IP/OBS MODERATE 55: CPT | Performed by: PSYCHIATRY & NEUROLOGY

## 2024-01-01 PROCEDURE — 85049 AUTOMATED PLATELET COUNT: CPT | Performed by: INTERNAL MEDICINE

## 2024-01-01 PROCEDURE — 85027 COMPLETE CBC AUTOMATED: CPT | Performed by: INTERNAL MEDICINE

## 2024-01-01 PROCEDURE — 81001 URINALYSIS AUTO W/SCOPE: CPT | Performed by: EMERGENCY MEDICINE

## 2024-01-01 PROCEDURE — 85610 PROTHROMBIN TIME: CPT | Performed by: PHYSICIAN ASSISTANT

## 2024-01-01 PROCEDURE — C8929 TTE W OR WO FOL WCON,DOPPLER: HCPCS

## 2024-01-01 PROCEDURE — 93010 ELECTROCARDIOGRAM REPORT: CPT | Performed by: PHYSICIAN ASSISTANT

## 2024-01-01 PROCEDURE — 83880 ASSAY OF NATRIURETIC PEPTIDE: CPT | Performed by: EMERGENCY MEDICINE

## 2024-01-01 PROCEDURE — 85041 AUTOMATED RBC COUNT: CPT | Performed by: PHYSICIAN ASSISTANT

## 2024-01-01 PROCEDURE — 250N000013 HC RX MED GY IP 250 OP 250 PS 637: Performed by: EMERGENCY MEDICINE

## 2024-01-01 PROCEDURE — 83735 ASSAY OF MAGNESIUM: CPT | Performed by: INTERNAL MEDICINE

## 2024-01-01 PROCEDURE — 70547 MR ANGIOGRAPHY NECK W/O DYE: CPT | Mod: 26 | Performed by: RADIOLOGY

## 2024-01-01 PROCEDURE — 76857 US EXAM PELVIC LIMITED: CPT

## 2024-01-01 PROCEDURE — 99214 OFFICE O/P EST MOD 30 MIN: CPT | Mod: 95 | Performed by: PHYSICIAN ASSISTANT

## 2024-01-01 PROCEDURE — 85041 AUTOMATED RBC COUNT: CPT | Performed by: STUDENT IN AN ORGANIZED HEALTH CARE EDUCATION/TRAINING PROGRAM

## 2024-01-01 PROCEDURE — 87086 URINE CULTURE/COLONY COUNT: CPT | Performed by: PHYSICIAN ASSISTANT

## 2024-01-01 PROCEDURE — 999N000147 HC STATISTIC PT IP EVAL DEFER

## 2024-01-01 PROCEDURE — 93325 DOPPLER ECHO COLOR FLOW MAPG: CPT

## 2024-01-01 PROCEDURE — 74176 CT ABD & PELVIS W/O CONTRAST: CPT | Mod: 26 | Performed by: RADIOLOGY

## 2024-01-01 PROCEDURE — 70551 MRI BRAIN STEM W/O DYE: CPT | Mod: 26 | Performed by: RADIOLOGY

## 2024-01-01 PROCEDURE — G0463 HOSPITAL OUTPT CLINIC VISIT: HCPCS

## 2024-01-01 PROCEDURE — 84460 ALANINE AMINO (ALT) (SGPT): CPT | Performed by: PHYSICIAN ASSISTANT

## 2024-01-01 PROCEDURE — 85027 COMPLETE CBC AUTOMATED: CPT | Performed by: PHYSICIAN ASSISTANT

## 2024-01-01 PROCEDURE — 85049 AUTOMATED PLATELET COUNT: CPT | Performed by: STUDENT IN AN ORGANIZED HEALTH CARE EDUCATION/TRAINING PROGRAM

## 2024-01-01 PROCEDURE — 99231 SBSQ HOSP IP/OBS SF/LOW 25: CPT | Performed by: NURSE PRACTITIONER

## 2024-01-01 PROCEDURE — 84484 ASSAY OF TROPONIN QUANT: CPT | Performed by: STUDENT IN AN ORGANIZED HEALTH CARE EDUCATION/TRAINING PROGRAM

## 2024-01-01 PROCEDURE — 99222 1ST HOSP IP/OBS MODERATE 55: CPT | Mod: GC | Performed by: UROLOGY

## 2024-01-01 PROCEDURE — 99233 SBSQ HOSP IP/OBS HIGH 50: CPT | Performed by: INTERNAL MEDICINE

## 2024-01-01 PROCEDURE — 99285 EMERGENCY DEPT VISIT HI MDM: CPT | Performed by: EMERGENCY MEDICINE

## 2024-01-01 PROCEDURE — 96365 THER/PROPH/DIAG IV INF INIT: CPT | Performed by: EMERGENCY MEDICINE

## 2024-01-01 PROCEDURE — G2211 COMPLEX E/M VISIT ADD ON: HCPCS | Mod: 95 | Performed by: NURSE PRACTITIONER

## 2024-01-01 PROCEDURE — 81001 URINALYSIS AUTO W/SCOPE: CPT | Mod: ORL | Performed by: NURSE PRACTITIONER

## 2024-01-01 PROCEDURE — 87637 SARSCOV2&INF A&B&RSV AMP PRB: CPT | Performed by: EMERGENCY MEDICINE

## 2024-01-01 PROCEDURE — 99223 1ST HOSP IP/OBS HIGH 75: CPT | Mod: AI | Performed by: INTERNAL MEDICINE

## 2024-01-01 PROCEDURE — 85027 COMPLETE CBC AUTOMATED: CPT | Mod: ORL | Performed by: INTERNAL MEDICINE

## 2024-01-01 PROCEDURE — 82805 BLOOD GASES W/O2 SATURATION: CPT

## 2024-01-01 PROCEDURE — 99223 1ST HOSP IP/OBS HIGH 75: CPT | Performed by: STUDENT IN AN ORGANIZED HEALTH CARE EDUCATION/TRAINING PROGRAM

## 2024-01-01 PROCEDURE — 99239 HOSP IP/OBS DSCHRG MGMT >30: CPT | Performed by: STUDENT IN AN ORGANIZED HEALTH CARE EDUCATION/TRAINING PROGRAM

## 2024-01-01 PROCEDURE — 84443 ASSAY THYROID STIM HORMONE: CPT

## 2024-01-01 PROCEDURE — 84145 PROCALCITONIN (PCT): CPT | Performed by: PHYSICIAN ASSISTANT

## 2024-01-01 PROCEDURE — 36416 COLLJ CAPILLARY BLOOD SPEC: CPT | Performed by: STUDENT IN AN ORGANIZED HEALTH CARE EDUCATION/TRAINING PROGRAM

## 2024-01-01 PROCEDURE — G1010 CDSM STANSON: HCPCS

## 2024-01-01 PROCEDURE — 72141 MRI NECK SPINE W/O DYE: CPT | Mod: 26 | Performed by: RADIOLOGY

## 2024-01-01 PROCEDURE — 99239 HOSP IP/OBS DSCHRG MGMT >30: CPT | Performed by: INTERNAL MEDICINE

## 2024-01-01 PROCEDURE — 99222 1ST HOSP IP/OBS MODERATE 55: CPT | Performed by: PHYSICIAN ASSISTANT

## 2024-01-01 PROCEDURE — 80053 COMPREHEN METABOLIC PANEL: CPT | Performed by: EMERGENCY MEDICINE

## 2024-01-01 PROCEDURE — XW033E5 INTRODUCTION OF REMDESIVIR ANTI-INFECTIVE INTO PERIPHERAL VEIN, PERCUTANEOUS APPROACH, NEW TECHNOLOGY GROUP 5: ICD-10-PCS | Performed by: INTERNAL MEDICINE

## 2024-01-01 PROCEDURE — 82962 GLUCOSE BLOOD TEST: CPT

## 2024-01-01 PROCEDURE — 85004 AUTOMATED DIFF WBC COUNT: CPT | Performed by: EMERGENCY MEDICINE

## 2024-01-01 RX ORDER — TOLTERODINE TARTRATE 2 MG/1
2 TABLET, EXTENDED RELEASE ORAL 2 TIMES DAILY
Qty: 60 TABLET | Refills: 1 | Status: SHIPPED | OUTPATIENT
Start: 2024-01-01 | End: 2024-01-01

## 2024-01-01 RX ORDER — BUMETANIDE 2 MG/1
4 TABLET ORAL DAILY
Status: DISCONTINUED | OUTPATIENT
Start: 2024-01-01 | End: 2024-01-01 | Stop reason: HOSPADM

## 2024-01-01 RX ORDER — HALOPERIDOL 2 MG/ML
1 SOLUTION ORAL EVERY 6 HOURS PRN
Qty: 15 ML | Refills: 0 | Status: SHIPPED | OUTPATIENT
Start: 2024-01-01

## 2024-01-01 RX ORDER — LOSARTAN POTASSIUM 25 MG/1
25 TABLET ORAL DAILY
Qty: 90 TABLET | Refills: 1 | Status: ON HOLD | OUTPATIENT
Start: 2024-01-01 | End: 2024-01-01

## 2024-01-01 RX ORDER — NALOXONE HYDROCHLORIDE 0.4 MG/ML
0.2 INJECTION, SOLUTION INTRAMUSCULAR; INTRAVENOUS; SUBCUTANEOUS
Status: DISCONTINUED | OUTPATIENT
Start: 2024-01-01 | End: 2024-01-01 | Stop reason: HOSPADM

## 2024-01-01 RX ORDER — LOSARTAN POTASSIUM 25 MG/1
25 TABLET ORAL DAILY
Status: DISCONTINUED | OUTPATIENT
Start: 2024-01-01 | End: 2024-01-01 | Stop reason: HOSPADM

## 2024-01-01 RX ORDER — BUMETANIDE 0.25 MG/ML
1 INJECTION INTRAMUSCULAR; INTRAVENOUS ONCE
Status: COMPLETED | OUTPATIENT
Start: 2024-01-01 | End: 2024-01-01

## 2024-01-01 RX ORDER — COLCHICINE 0.6 MG/1
0.6 TABLET ORAL DAILY PRN
Status: DISCONTINUED | OUTPATIENT
Start: 2024-01-01 | End: 2024-01-01 | Stop reason: HOSPADM

## 2024-01-01 RX ORDER — POTASSIUM CHLORIDE 750 MG/1
TABLET, EXTENDED RELEASE ORAL
Qty: 360 TABLET | Refills: 3 | Status: ON HOLD | OUTPATIENT
Start: 2024-01-01 | End: 2024-01-01

## 2024-01-01 RX ORDER — ASPIRIN 81 MG/1
81 TABLET ORAL DAILY
Status: DISCONTINUED | OUTPATIENT
Start: 2024-01-01 | End: 2024-01-01

## 2024-01-01 RX ORDER — TOLTERODINE 2 MG/1
2 CAPSULE, EXTENDED RELEASE ORAL DAILY
Qty: 90 CAPSULE | Refills: 1 | Status: SHIPPED | OUTPATIENT
Start: 2024-01-01 | End: 2024-01-01

## 2024-01-01 RX ORDER — METOPROLOL SUCCINATE 25 MG/1
25 TABLET, EXTENDED RELEASE ORAL EVERY EVENING
Qty: 30 TABLET | Refills: 0 | Status: SHIPPED | OUTPATIENT
Start: 2024-01-01

## 2024-01-01 RX ORDER — MIRABEGRON 25 MG/1
25 TABLET, FILM COATED, EXTENDED RELEASE ORAL DAILY
Status: DISCONTINUED | OUTPATIENT
Start: 2024-01-01 | End: 2024-01-01 | Stop reason: HOSPADM

## 2024-01-01 RX ORDER — VENLAFAXINE HYDROCHLORIDE 150 MG/1
150 CAPSULE, EXTENDED RELEASE ORAL DAILY
Qty: 30 CAPSULE | Refills: 0 | Status: SHIPPED | OUTPATIENT
Start: 2024-01-01

## 2024-01-01 RX ORDER — NICOTINE POLACRILEX 4 MG
15-30 LOZENGE BUCCAL
Status: DISCONTINUED | OUTPATIENT
Start: 2024-01-01 | End: 2024-01-01 | Stop reason: HOSPADM

## 2024-01-01 RX ORDER — BISACODYL 10 MG
10 SUPPOSITORY, RECTAL RECTAL DAILY PRN
Qty: 10 SUPPOSITORY | Refills: 0 | Status: SHIPPED | OUTPATIENT
Start: 2024-01-01

## 2024-01-01 RX ORDER — LEVOFLOXACIN 750 MG/1
750 TABLET, FILM COATED ORAL ONCE
Status: COMPLETED | OUTPATIENT
Start: 2024-01-01 | End: 2024-01-01

## 2024-01-01 RX ORDER — FEBUXOSTAT 80 MG/1
80 TABLET, FILM COATED ORAL DAILY
Qty: 90 TABLET | Refills: 0 | Status: ON HOLD | OUTPATIENT
Start: 2024-01-01 | End: 2024-01-01

## 2024-01-01 RX ORDER — LEVOFLOXACIN 5 MG/ML
750 INJECTION, SOLUTION INTRAVENOUS
Status: DISCONTINUED | OUTPATIENT
Start: 2024-01-01 | End: 2024-01-01

## 2024-01-01 RX ORDER — MIRABEGRON 25 MG/1
25 TABLET, FILM COATED, EXTENDED RELEASE ORAL DAILY
Qty: 30 TABLET | Refills: 0 | Status: SHIPPED | OUTPATIENT
Start: 2024-01-01

## 2024-01-01 RX ORDER — CEFTRIAXONE 1 G/1
1 INJECTION, POWDER, FOR SOLUTION INTRAMUSCULAR; INTRAVENOUS EVERY 24 HOURS
Status: DISCONTINUED | OUTPATIENT
Start: 2024-01-01 | End: 2024-01-01

## 2024-01-01 RX ORDER — ANASTROZOLE 1 MG/1
1 TABLET ORAL DAILY
Status: DISCONTINUED | OUTPATIENT
Start: 2024-01-01 | End: 2024-01-01 | Stop reason: HOSPADM

## 2024-01-01 RX ORDER — ACETAMINOPHEN 500 MG
1000 TABLET ORAL 2 TIMES DAILY PRN
Status: ON HOLD | COMMUNITY
End: 2024-01-01

## 2024-01-01 RX ORDER — FUROSEMIDE 10 MG/ML
40 INJECTION INTRAMUSCULAR; INTRAVENOUS
Status: DISCONTINUED | OUTPATIENT
Start: 2024-01-01 | End: 2024-01-01

## 2024-01-01 RX ORDER — POLYETHYLENE GLYCOL 3350 17 G/17G
17 POWDER, FOR SOLUTION ORAL 2 TIMES DAILY
Status: DISCONTINUED | OUTPATIENT
Start: 2024-01-01 | End: 2024-01-01

## 2024-01-01 RX ORDER — FERROUS GLUCONATE 324(38)MG
TABLET ORAL
Qty: 36 TABLET | Refills: 11 | Status: ON HOLD | OUTPATIENT
Start: 2024-01-01 | End: 2024-01-01

## 2024-01-01 RX ORDER — DEXTROSE MONOHYDRATE 25 G/50ML
25-50 INJECTION, SOLUTION INTRAVENOUS
Status: DISCONTINUED | OUTPATIENT
Start: 2024-01-01 | End: 2024-01-01 | Stop reason: HOSPADM

## 2024-01-01 RX ORDER — MAGNESIUM SULFATE HEPTAHYDRATE 40 MG/ML
2 INJECTION, SOLUTION INTRAVENOUS ONCE
Status: COMPLETED | OUTPATIENT
Start: 2024-01-01 | End: 2024-01-01

## 2024-01-01 RX ORDER — METOPROLOL SUCCINATE 25 MG/1
25 TABLET, EXTENDED RELEASE ORAL EVERY EVENING
Qty: 90 TABLET | Refills: 3 | Status: ON HOLD | OUTPATIENT
Start: 2024-01-01 | End: 2024-01-01

## 2024-01-01 RX ORDER — NALOXONE HYDROCHLORIDE 0.4 MG/ML
0.4 INJECTION, SOLUTION INTRAMUSCULAR; INTRAVENOUS; SUBCUTANEOUS
Status: DISCONTINUED | OUTPATIENT
Start: 2024-01-01 | End: 2024-01-01 | Stop reason: HOSPADM

## 2024-01-01 RX ORDER — INSULIN GLARGINE 100 [IU]/ML
5 INJECTION, SOLUTION SUBCUTANEOUS AT BEDTIME
Qty: 3 ML | Refills: 0 | Status: SHIPPED | OUTPATIENT
Start: 2024-01-01

## 2024-01-01 RX ORDER — FEBUXOSTAT 80 MG/1
80 TABLET, FILM COATED ORAL DAILY
Qty: 30 TABLET | Refills: 0 | Status: SHIPPED | OUTPATIENT
Start: 2024-01-01

## 2024-01-01 RX ORDER — BUMETANIDE 1 MG/1
4 TABLET ORAL DAILY
Qty: 360 TABLET | Refills: 1 | Status: ON HOLD | OUTPATIENT
Start: 2024-01-01 | End: 2024-01-01

## 2024-01-01 RX ORDER — METOPROLOL TARTRATE 1 MG/ML
2.5 INJECTION, SOLUTION INTRAVENOUS ONCE
Status: COMPLETED | OUTPATIENT
Start: 2024-01-01 | End: 2024-01-01

## 2024-01-01 RX ORDER — ANASTROZOLE 1 MG/1
1 TABLET ORAL DAILY
Qty: 90 TABLET | Refills: 3 | Status: ON HOLD | OUTPATIENT
Start: 2024-01-01 | End: 2024-01-01

## 2024-01-01 RX ORDER — ASPIRIN 81 MG/1
81 TABLET ORAL DAILY
Status: DISCONTINUED | OUTPATIENT
Start: 2024-01-01 | End: 2024-01-01 | Stop reason: HOSPADM

## 2024-01-01 RX ORDER — METOPROLOL SUCCINATE 25 MG/1
TABLET, EXTENDED RELEASE ORAL
Qty: 135 TABLET | Refills: 3 | OUTPATIENT
Start: 2024-01-01

## 2024-01-01 RX ORDER — HEPARIN SODIUM 5000 [USP'U]/.5ML
5000 INJECTION, SOLUTION INTRAVENOUS; SUBCUTANEOUS EVERY 8 HOURS
Status: DISCONTINUED | OUTPATIENT
Start: 2024-01-01 | End: 2024-01-01 | Stop reason: HOSPADM

## 2024-01-01 RX ORDER — POLYETHYLENE GLYCOL 3350 17 G/17G
17 POWDER, FOR SOLUTION ORAL 2 TIMES DAILY PRN
Qty: 116 G | Refills: 0 | Status: SHIPPED | OUTPATIENT
Start: 2024-01-01

## 2024-01-01 RX ORDER — HEPARIN SODIUM 1000 [USP'U]/ML
5000 INJECTION, SOLUTION INTRAVENOUS; SUBCUTANEOUS EVERY 8 HOURS
Status: DISCONTINUED | OUTPATIENT
Start: 2024-01-01 | End: 2024-01-01

## 2024-01-01 RX ORDER — BUMETANIDE 1 MG/1
4 TABLET ORAL DAILY
Qty: 120 TABLET | Refills: 0 | Status: SHIPPED | OUTPATIENT
Start: 2024-01-01

## 2024-01-01 RX ORDER — NICOTINE POLACRILEX 4 MG
15-30 LOZENGE BUCCAL
Status: DISCONTINUED | OUTPATIENT
Start: 2024-01-01 | End: 2024-01-01

## 2024-01-01 RX ORDER — LOSARTAN POTASSIUM 25 MG/1
25 TABLET ORAL DAILY
Qty: 90 TABLET | Refills: 1 | OUTPATIENT
Start: 2024-01-01

## 2024-01-01 RX ORDER — ATORVASTATIN CALCIUM 40 MG/1
40 TABLET, FILM COATED ORAL DAILY
Status: DISCONTINUED | OUTPATIENT
Start: 2024-01-01 | End: 2024-01-01 | Stop reason: HOSPADM

## 2024-01-01 RX ORDER — PREGABALIN 100 MG/1
100 CAPSULE ORAL 2 TIMES DAILY
Qty: 30 CAPSULE | Refills: 0 | Status: SHIPPED | OUTPATIENT
Start: 2024-01-01

## 2024-01-01 RX ORDER — MIRABEGRON 25 MG/1
25 TABLET, FILM COATED, EXTENDED RELEASE ORAL DAILY
Qty: 30 TABLET | Refills: 3 | Status: ON HOLD | OUTPATIENT
Start: 2024-01-01 | End: 2024-01-01

## 2024-01-01 RX ORDER — ACETAMINOPHEN 325 MG/1
650 TABLET ORAL EVERY 4 HOURS PRN
Status: DISCONTINUED | OUTPATIENT
Start: 2024-01-01 | End: 2024-01-01 | Stop reason: HOSPADM

## 2024-01-01 RX ORDER — LEVOTHYROXINE SODIUM 75 UG/1
150 TABLET ORAL DAILY
Status: DISCONTINUED | OUTPATIENT
Start: 2024-01-01 | End: 2024-01-01 | Stop reason: HOSPADM

## 2024-01-01 RX ORDER — AMOXICILLIN 250 MG
2 CAPSULE ORAL 2 TIMES DAILY PRN
Status: DISCONTINUED | OUTPATIENT
Start: 2024-01-01 | End: 2024-01-01 | Stop reason: HOSPADM

## 2024-01-01 RX ORDER — ALBUTEROL SULFATE 90 UG/1
2 AEROSOL, METERED RESPIRATORY (INHALATION) EVERY 6 HOURS
Status: DISCONTINUED | OUTPATIENT
Start: 2024-01-01 | End: 2024-01-01 | Stop reason: HOSPADM

## 2024-01-01 RX ORDER — CEFTRIAXONE 2 G/1
2 INJECTION, POWDER, FOR SOLUTION INTRAMUSCULAR; INTRAVENOUS EVERY 24 HOURS
Status: DISCONTINUED | OUTPATIENT
Start: 2024-01-01 | End: 2024-01-01

## 2024-01-01 RX ORDER — FEBUXOSTAT 40 MG/1
80 TABLET, FILM COATED ORAL DAILY
Status: DISCONTINUED | OUTPATIENT
Start: 2024-01-01 | End: 2024-01-01 | Stop reason: HOSPADM

## 2024-01-01 RX ORDER — CEFTRIAXONE 1 G/1
1 INJECTION, POWDER, FOR SOLUTION INTRAMUSCULAR; INTRAVENOUS ONCE
Status: COMPLETED | OUTPATIENT
Start: 2024-01-01 | End: 2024-01-01

## 2024-01-01 RX ORDER — HYDROMORPHONE HYDROCHLORIDE 1 MG/ML
2 SOLUTION ORAL EVERY 4 HOURS PRN
Qty: 30 ML | Refills: 0 | Status: SHIPPED | OUTPATIENT
Start: 2024-01-01

## 2024-01-01 RX ORDER — MAGNESIUM CARB/ALUMINUM HYDROX 105-160MG
296 TABLET,CHEWABLE ORAL ONCE
Status: COMPLETED | OUTPATIENT
Start: 2024-01-01 | End: 2024-01-01

## 2024-01-01 RX ORDER — POLYETHYLENE GLYCOL 3350 17 G/17G
17 POWDER, FOR SOLUTION ORAL 3 TIMES DAILY
Status: DISCONTINUED | OUTPATIENT
Start: 2024-01-01 | End: 2024-01-01 | Stop reason: HOSPADM

## 2024-01-01 RX ORDER — ACETAMINOPHEN 325 MG/1
650 TABLET ORAL EVERY 4 HOURS PRN
Qty: 30 TABLET | Refills: 0 | Status: SHIPPED | OUTPATIENT
Start: 2024-01-01

## 2024-01-01 RX ORDER — AMOXICILLIN 250 MG
1 CAPSULE ORAL 2 TIMES DAILY PRN
Status: DISCONTINUED | OUTPATIENT
Start: 2024-01-01 | End: 2024-01-01 | Stop reason: HOSPADM

## 2024-01-01 RX ORDER — CEFEPIME HYDROCHLORIDE 2 G/1
2 INJECTION, POWDER, FOR SOLUTION INTRAVENOUS EVERY 24 HOURS
Status: DISCONTINUED | OUTPATIENT
Start: 2024-01-01 | End: 2024-01-01

## 2024-01-01 RX ORDER — VENLAFAXINE HYDROCHLORIDE 150 MG/1
150 CAPSULE, EXTENDED RELEASE ORAL DAILY
Status: DISCONTINUED | OUTPATIENT
Start: 2024-01-01 | End: 2024-01-01 | Stop reason: HOSPADM

## 2024-01-01 RX ORDER — LEVOTHYROXINE SODIUM 150 UG/1
150 TABLET ORAL DAILY
Qty: 90 TABLET | Refills: 3 | Status: ON HOLD | OUTPATIENT
Start: 2024-01-01 | End: 2024-01-01

## 2024-01-01 RX ORDER — SODIUM CHLORIDE 9 MG/ML
INJECTION, SOLUTION INTRAVENOUS
Status: COMPLETED
Start: 2024-01-01 | End: 2024-01-01

## 2024-01-01 RX ORDER — PREGABALIN 100 MG/1
100 CAPSULE ORAL 2 TIMES DAILY
Status: DISCONTINUED | OUTPATIENT
Start: 2024-01-01 | End: 2024-01-01 | Stop reason: HOSPADM

## 2024-01-01 RX ORDER — ACETAMINOPHEN 500 MG
1000 TABLET ORAL EVERY 6 HOURS PRN
Status: DISCONTINUED | OUTPATIENT
Start: 2024-01-01 | End: 2024-01-01 | Stop reason: HOSPADM

## 2024-01-01 RX ORDER — FEBUXOSTAT 80 MG/1
80 TABLET, FILM COATED ORAL DAILY
Qty: 90 TABLET | Refills: 0 | Status: SHIPPED | OUTPATIENT
Start: 2024-01-01 | End: 2024-01-01

## 2024-01-01 RX ORDER — NYSTATIN 100000 U/G
OINTMENT TOPICAL 2 TIMES DAILY
Qty: 15 G | Refills: 0 | Status: SHIPPED | OUTPATIENT
Start: 2024-01-01

## 2024-01-01 RX ORDER — AMOXICILLIN 250 MG
1 CAPSULE ORAL 2 TIMES DAILY PRN
Qty: 30 TABLET | Refills: 0 | Status: SHIPPED | OUTPATIENT
Start: 2024-01-01

## 2024-01-01 RX ORDER — FEBUXOSTAT 80 MG/1
80 TABLET, FILM COATED ORAL DAILY
Qty: 90 TABLET | Refills: 0 | OUTPATIENT
Start: 2024-01-01

## 2024-01-01 RX ORDER — INSULIN GLARGINE 100 [IU]/ML
5 INJECTION, SOLUTION SUBCUTANEOUS EVERY MORNING
Status: ON HOLD | DISCHARGE
Start: 2024-01-01 | End: 2024-01-01

## 2024-01-01 RX ORDER — TOLTERODINE TARTRATE 2 MG/1
2 TABLET, EXTENDED RELEASE ORAL 2 TIMES DAILY
Qty: 180 TABLET | Refills: 1 | Status: SHIPPED | OUTPATIENT
Start: 2024-01-01 | End: 2024-01-01 | Stop reason: DRUGHIGH

## 2024-01-01 RX ORDER — AMOXICILLIN 250 MG
2 CAPSULE ORAL 2 TIMES DAILY
Status: DISCONTINUED | OUTPATIENT
Start: 2024-01-01 | End: 2024-01-01 | Stop reason: HOSPADM

## 2024-01-01 RX ORDER — LIDOCAINE 40 MG/G
CREAM TOPICAL
Status: DISCONTINUED | OUTPATIENT
Start: 2024-01-01 | End: 2024-01-01 | Stop reason: HOSPADM

## 2024-01-01 RX ORDER — ATORVASTATIN CALCIUM 40 MG/1
TABLET, FILM COATED ORAL
Qty: 90 TABLET | Refills: 1 | Status: ON HOLD | OUTPATIENT
Start: 2024-01-01 | End: 2024-01-01

## 2024-01-01 RX ORDER — BUMETANIDE 0.25 MG/ML
4 INJECTION INTRAMUSCULAR; INTRAVENOUS ONCE
Status: DISCONTINUED | OUTPATIENT
Start: 2024-01-01 | End: 2024-01-01

## 2024-01-01 RX ORDER — ALBUTEROL SULFATE 90 UG/1
1-2 AEROSOL, METERED RESPIRATORY (INHALATION) EVERY 6 HOURS PRN
Status: DISCONTINUED | OUTPATIENT
Start: 2024-01-01 | End: 2024-01-01 | Stop reason: HOSPADM

## 2024-01-01 RX ORDER — COLCHICINE 0.6 MG/1
0.6 TABLET ORAL DAILY PRN
Qty: 30 TABLET | Refills: 0 | Status: SHIPPED | OUTPATIENT
Start: 2024-01-01

## 2024-01-01 RX ORDER — ENOXAPARIN SODIUM 100 MG/ML
40 INJECTION SUBCUTANEOUS EVERY 12 HOURS
Status: DISCONTINUED | OUTPATIENT
Start: 2024-01-01 | End: 2024-01-01 | Stop reason: HOSPADM

## 2024-01-01 RX ORDER — BUMETANIDE 0.25 MG/ML
4 INJECTION INTRAMUSCULAR; INTRAVENOUS EVERY 12 HOURS
Status: DISCONTINUED | OUTPATIENT
Start: 2024-01-01 | End: 2024-01-01

## 2024-01-01 RX ORDER — KETOROLAC TROMETHAMINE 30 MG/ML
1 INJECTION, SOLUTION INTRAMUSCULAR; INTRAVENOUS ONCE
Qty: 1 EACH | Refills: 0 | Status: SHIPPED | OUTPATIENT
Start: 2024-01-01 | End: 2024-01-01

## 2024-01-01 RX ORDER — DEXTROSE MONOHYDRATE 25 G/50ML
25-50 INJECTION, SOLUTION INTRAVENOUS
Status: DISCONTINUED | OUTPATIENT
Start: 2024-01-01 | End: 2024-01-01

## 2024-01-01 RX ORDER — ACETAMINOPHEN 650 MG/1
650 SUPPOSITORY RECTAL EVERY 4 HOURS PRN
Qty: 30 SUPPOSITORY | Refills: 0 | Status: SHIPPED | OUTPATIENT
Start: 2024-01-01

## 2024-01-01 RX ORDER — ASPIRIN 81 MG/1
81 TABLET ORAL DAILY
Qty: 30 TABLET | Refills: 0 | Status: SHIPPED | OUTPATIENT
Start: 2024-01-01

## 2024-01-01 RX ORDER — PREGABALIN 100 MG/1
100 CAPSULE ORAL 2 TIMES DAILY
Qty: 60 CAPSULE | Refills: 4 | Status: ON HOLD | OUTPATIENT
Start: 2024-01-01 | End: 2024-01-01

## 2024-01-01 RX ORDER — INSULIN GLARGINE 100 [IU]/ML
INJECTION, SOLUTION SUBCUTANEOUS
Qty: 30 ML | Refills: 0 | Status: ON HOLD | OUTPATIENT
Start: 2024-01-01 | End: 2024-01-01

## 2024-01-01 RX ORDER — METOPROLOL SUCCINATE 25 MG/1
TABLET, EXTENDED RELEASE ORAL
Qty: 135 TABLET | Refills: 3 | Status: SHIPPED | OUTPATIENT
Start: 2024-01-01 | End: 2024-01-01

## 2024-01-01 RX ORDER — CALCIUM CARBONATE 500 MG/1
1000 TABLET, CHEWABLE ORAL 4 TIMES DAILY PRN
Status: DISCONTINUED | OUTPATIENT
Start: 2024-01-01 | End: 2024-01-01 | Stop reason: HOSPADM

## 2024-01-01 RX ORDER — CEPHALEXIN 500 MG/1
500 CAPSULE ORAL 4 TIMES DAILY
Qty: 28 CAPSULE | Refills: 0 | Status: SHIPPED | OUTPATIENT
Start: 2024-01-01 | End: 2024-01-01

## 2024-01-01 RX ORDER — VENLAFAXINE HYDROCHLORIDE 150 MG/1
150 CAPSULE, EXTENDED RELEASE ORAL DAILY
Qty: 30 CAPSULE | Refills: 3 | Status: ON HOLD | OUTPATIENT
Start: 2024-01-01 | End: 2024-01-01

## 2024-01-01 RX ORDER — HYDROMORPHONE HYDROCHLORIDE 1 MG/ML
4 SOLUTION ORAL EVERY 4 HOURS PRN
Qty: 30 ML | Refills: 0 | Status: SHIPPED | OUTPATIENT
Start: 2024-01-01

## 2024-01-01 RX ORDER — PREGABALIN 100 MG/1
100 CAPSULE ORAL 2 TIMES DAILY
Qty: 60 CAPSULE | Refills: 1 | Status: SHIPPED | OUTPATIENT
Start: 2024-01-01 | End: 2024-01-01

## 2024-01-01 RX ORDER — POLYETHYLENE GLYCOL 3350 17 G/17G
17 POWDER, FOR SOLUTION ORAL 2 TIMES DAILY PRN
Status: DISCONTINUED | OUTPATIENT
Start: 2024-01-01 | End: 2024-01-01 | Stop reason: HOSPADM

## 2024-01-01 RX ORDER — CEPHALEXIN 500 MG/1
500 CAPSULE ORAL ONCE
Qty: 1 CAPSULE | Refills: 0 | Status: COMPLETED | OUTPATIENT
Start: 2024-01-01 | End: 2024-01-01

## 2024-01-01 RX ORDER — ONDANSETRON 4 MG/1
4 TABLET, ORALLY DISINTEGRATING ORAL EVERY 6 HOURS PRN
Qty: 30 TABLET | Refills: 0 | Status: SHIPPED | OUTPATIENT
Start: 2024-01-01

## 2024-01-01 RX ORDER — POLYETHYLENE GLYCOL 3350 17 G/17G
17 POWDER, FOR SOLUTION ORAL 2 TIMES DAILY
Status: ON HOLD | DISCHARGE
Start: 2024-01-01 | End: 2024-01-01

## 2024-01-01 RX ORDER — METOPROLOL SUCCINATE 25 MG/1
25 TABLET, EXTENDED RELEASE ORAL EVERY EVENING
Status: DISCONTINUED | OUTPATIENT
Start: 2024-01-01 | End: 2024-01-01 | Stop reason: HOSPADM

## 2024-01-01 RX ORDER — ANASTROZOLE 1 MG/1
1 TABLET ORAL DAILY
Qty: 30 TABLET | Refills: 0 | Status: SHIPPED | OUTPATIENT
Start: 2024-01-01

## 2024-01-01 RX ORDER — ONDANSETRON 2 MG/ML
4 INJECTION INTRAMUSCULAR; INTRAVENOUS EVERY 6 HOURS PRN
Status: DISCONTINUED | OUTPATIENT
Start: 2024-01-01 | End: 2024-01-01 | Stop reason: HOSPADM

## 2024-01-01 RX ORDER — CEPHALEXIN 500 MG/1
500 CAPSULE ORAL 2 TIMES DAILY
Qty: 28 CAPSULE | Refills: 0 | Status: SHIPPED | OUTPATIENT
Start: 2024-01-01 | End: 2024-01-01

## 2024-01-01 RX ORDER — HYDROMORPHONE HYDROCHLORIDE 1 MG/ML
4 SOLUTION ORAL EVERY 4 HOURS PRN
Status: DISCONTINUED | OUTPATIENT
Start: 2024-01-01 | End: 2024-01-01 | Stop reason: HOSPADM

## 2024-01-01 RX ORDER — DEXAMETHASONE SODIUM PHOSPHATE 10 MG/ML
6 INJECTION, SOLUTION INTRAMUSCULAR; INTRAVENOUS EVERY 24 HOURS
Status: DISCONTINUED | OUTPATIENT
Start: 2024-01-01 | End: 2024-01-01

## 2024-01-01 RX ORDER — LORAZEPAM 2 MG/ML
0.5 CONCENTRATE ORAL
Qty: 10 ML | Refills: 0 | Status: SHIPPED | OUTPATIENT
Start: 2024-01-01

## 2024-01-01 RX ORDER — BLOOD-GLUCOSE SENSOR
1 EACH MISCELLANEOUS
Qty: 6 EACH | Refills: 5 | Status: SHIPPED | OUTPATIENT
Start: 2024-01-01

## 2024-01-01 RX ORDER — NYSTATIN 100000 U/G
OINTMENT TOPICAL 2 TIMES DAILY
Status: DISCONTINUED | OUTPATIENT
Start: 2024-01-01 | End: 2024-01-01 | Stop reason: HOSPADM

## 2024-01-01 RX ORDER — ATORVASTATIN CALCIUM 40 MG/1
TABLET, FILM COATED ORAL
Qty: 30 TABLET | Refills: 0 | Status: SHIPPED | OUTPATIENT
Start: 2024-01-01

## 2024-01-01 RX ORDER — LEVOTHYROXINE SODIUM 150 UG/1
150 TABLET ORAL DAILY
Qty: 30 TABLET | Refills: 0 | Status: SHIPPED | OUTPATIENT
Start: 2024-01-01

## 2024-01-01 RX ORDER — AMOXICILLIN 250 MG
1 CAPSULE ORAL 2 TIMES DAILY
Status: DISCONTINUED | OUTPATIENT
Start: 2024-01-01 | End: 2024-01-01 | Stop reason: HOSPADM

## 2024-01-01 RX ORDER — ACETAMINOPHEN 650 MG/1
650 SUPPOSITORY RECTAL EVERY 4 HOURS PRN
Status: DISCONTINUED | OUTPATIENT
Start: 2024-01-01 | End: 2024-01-01 | Stop reason: HOSPADM

## 2024-01-01 RX ORDER — FLASH GLUCOSE SENSOR
KIT MISCELLANEOUS
Qty: 6 EACH | Refills: 4 | Status: SHIPPED | OUTPATIENT
Start: 2024-01-01 | End: 2024-01-01

## 2024-01-01 RX ORDER — ONDANSETRON 4 MG/1
4 TABLET, ORALLY DISINTEGRATING ORAL EVERY 6 HOURS PRN
Status: DISCONTINUED | OUTPATIENT
Start: 2024-01-01 | End: 2024-01-01 | Stop reason: HOSPADM

## 2024-01-01 RX ORDER — LOSARTAN POTASSIUM 25 MG/1
25 TABLET ORAL DAILY
Qty: 30 TABLET | Refills: 0 | Status: SHIPPED | OUTPATIENT
Start: 2024-01-01

## 2024-01-01 RX ORDER — ALBUTEROL SULFATE 90 UG/1
2 AEROSOL, METERED RESPIRATORY (INHALATION) EVERY 6 HOURS PRN
Qty: 18 G | Refills: 0 | Status: SHIPPED | OUTPATIENT
Start: 2024-01-01

## 2024-01-01 RX ORDER — HYDROMORPHONE HYDROCHLORIDE 1 MG/ML
2 SOLUTION ORAL EVERY 4 HOURS PRN
Status: DISCONTINUED | OUTPATIENT
Start: 2024-01-01 | End: 2024-01-01 | Stop reason: HOSPADM

## 2024-01-01 RX ORDER — ATROPINE SULFATE 10 MG/ML
1-2 SOLUTION/ DROPS OPHTHALMIC
Qty: 5 ML | Refills: 0 | Status: SHIPPED | OUTPATIENT
Start: 2024-01-01

## 2024-01-01 RX ADMIN — INSULIN ASPART 1 UNITS: 100 INJECTION, SOLUTION INTRAVENOUS; SUBCUTANEOUS at 09:21

## 2024-01-01 RX ADMIN — METOPROLOL SUCCINATE 25 MG: 25 TABLET, FILM COATED, EXTENDED RELEASE ORAL at 20:25

## 2024-01-01 RX ADMIN — ATORVASTATIN CALCIUM 40 MG: 40 TABLET, FILM COATED ORAL at 07:57

## 2024-01-01 RX ADMIN — INSULIN ASPART 1 UNITS: 100 INJECTION, SOLUTION INTRAVENOUS; SUBCUTANEOUS at 18:26

## 2024-01-01 RX ADMIN — ASPIRIN 81 MG: 81 TABLET, COATED ORAL at 08:40

## 2024-01-01 RX ADMIN — ENOXAPARIN SODIUM 40 MG: 40 INJECTION SUBCUTANEOUS at 09:01

## 2024-01-01 RX ADMIN — PREGABALIN 100 MG: 100 CAPSULE ORAL at 09:03

## 2024-01-01 RX ADMIN — LEVOTHYROXINE SODIUM 150 MCG: 75 TABLET ORAL at 10:46

## 2024-01-01 RX ADMIN — PREGABALIN 100 MG: 100 CAPSULE ORAL at 08:08

## 2024-01-01 RX ADMIN — DICLOFENAC SODIUM TOPICAL GEL, 1% 2 G: 10 GEL TOPICAL at 08:55

## 2024-01-01 RX ADMIN — ENOXAPARIN SODIUM 40 MG: 40 INJECTION SUBCUTANEOUS at 20:25

## 2024-01-01 RX ADMIN — DEXAMETHASONE SODIUM PHOSPHATE 6 MG: 10 INJECTION, SOLUTION INTRAMUSCULAR; INTRAVENOUS at 12:35

## 2024-01-01 RX ADMIN — LOSARTAN POTASSIUM 25 MG: 25 TABLET, FILM COATED ORAL at 08:36

## 2024-01-01 RX ADMIN — LOSARTAN POTASSIUM 25 MG: 25 TABLET, FILM COATED ORAL at 08:08

## 2024-01-01 RX ADMIN — LOSARTAN POTASSIUM 25 MG: 25 TABLET, FILM COATED ORAL at 09:12

## 2024-01-01 RX ADMIN — VENLAFAXINE HYDROCHLORIDE 150 MG: 150 CAPSULE, EXTENDED RELEASE ORAL at 08:35

## 2024-01-01 RX ADMIN — NYSTATIN: 100000 OINTMENT TOPICAL at 09:47

## 2024-01-01 RX ADMIN — PREGABALIN 100 MG: 100 CAPSULE ORAL at 09:20

## 2024-01-01 RX ADMIN — HEPARIN SODIUM 5000 UNITS: 5000 INJECTION, SOLUTION INTRAVENOUS; SUBCUTANEOUS at 04:06

## 2024-01-01 RX ADMIN — POLYETHYLENE GLYCOL 3350 17 G: 17 POWDER, FOR SOLUTION ORAL at 20:25

## 2024-01-01 RX ADMIN — ALBUTEROL SULFATE 2 PUFF: 90 AEROSOL, METERED RESPIRATORY (INHALATION) at 20:41

## 2024-01-01 RX ADMIN — INSULIN ASPART 1 UNITS: 100 INJECTION, SOLUTION INTRAVENOUS; SUBCUTANEOUS at 18:32

## 2024-01-01 RX ADMIN — ANASTROZOLE 1 MG: 1 TABLET, COATED ORAL at 09:24

## 2024-01-01 RX ADMIN — LEVOFLOXACIN 750 MG: 5 INJECTION, SOLUTION INTRAVENOUS at 14:51

## 2024-01-01 RX ADMIN — METOPROLOL SUCCINATE 25 MG: 25 TABLET, FILM COATED, EXTENDED RELEASE ORAL at 20:41

## 2024-01-01 RX ADMIN — INSULIN ASPART 1 UNITS: 100 INJECTION, SOLUTION INTRAVENOUS; SUBCUTANEOUS at 17:39

## 2024-01-01 RX ADMIN — VENLAFAXINE HYDROCHLORIDE 150 MG: 150 CAPSULE, EXTENDED RELEASE ORAL at 08:11

## 2024-01-01 RX ADMIN — ANASTROZOLE 1 MG: 1 TABLET, COATED ORAL at 09:44

## 2024-01-01 RX ADMIN — VENLAFAXINE HYDROCHLORIDE 150 MG: 150 CAPSULE, EXTENDED RELEASE ORAL at 08:54

## 2024-01-01 RX ADMIN — NYSTATIN: 100000 OINTMENT TOPICAL at 21:28

## 2024-01-01 RX ADMIN — PREGABALIN 100 MG: 100 CAPSULE ORAL at 20:35

## 2024-01-01 RX ADMIN — MICONAZOLE NITRATE: 20 POWDER TOPICAL at 20:28

## 2024-01-01 RX ADMIN — INSULIN ASPART 1 UNITS: 100 INJECTION, SOLUTION INTRAVENOUS; SUBCUTANEOUS at 08:55

## 2024-01-01 RX ADMIN — ATORVASTATIN CALCIUM 40 MG: 40 TABLET, FILM COATED ORAL at 08:45

## 2024-01-01 RX ADMIN — MIRABEGRON 25 MG: 25 TABLET, FILM COATED, EXTENDED RELEASE ORAL at 09:25

## 2024-01-01 RX ADMIN — METOPROLOL SUCCINATE 25 MG: 25 TABLET, FILM COATED, EXTENDED RELEASE ORAL at 21:12

## 2024-01-01 RX ADMIN — PREGABALIN 100 MG: 100 CAPSULE ORAL at 08:55

## 2024-01-01 RX ADMIN — SODIUM CHLORIDE 50 ML: 9 INJECTION, SOLUTION INTRAVENOUS at 23:05

## 2024-01-01 RX ADMIN — DICLOFENAC SODIUM TOPICAL GEL, 1% 2 G: 10 GEL TOPICAL at 08:42

## 2024-01-01 RX ADMIN — PREGABALIN 100 MG: 100 CAPSULE ORAL at 08:50

## 2024-01-01 RX ADMIN — ATORVASTATIN CALCIUM 40 MG: 40 TABLET, FILM COATED ORAL at 09:02

## 2024-01-01 RX ADMIN — ALBUTEROL SULFATE 2 PUFF: 90 AEROSOL, METERED RESPIRATORY (INHALATION) at 20:51

## 2024-01-01 RX ADMIN — ANASTROZOLE 1 MG: 1 TABLET, COATED ORAL at 08:35

## 2024-01-01 RX ADMIN — ALBUTEROL SULFATE 2 PUFF: 90 AEROSOL, METERED RESPIRATORY (INHALATION) at 20:44

## 2024-01-01 RX ADMIN — LEVOTHYROXINE SODIUM 150 MCG: 75 TABLET ORAL at 08:12

## 2024-01-01 RX ADMIN — DICLOFENAC SODIUM TOPICAL GEL, 1% 2 G: 10 GEL TOPICAL at 13:07

## 2024-01-01 RX ADMIN — ASPIRIN 81 MG: 81 TABLET, COATED ORAL at 09:44

## 2024-01-01 RX ADMIN — INSULIN ASPART 1 UNITS: 100 INJECTION, SOLUTION INTRAVENOUS; SUBCUTANEOUS at 13:25

## 2024-01-01 RX ADMIN — INSULIN ASPART 2 UNITS: 100 INJECTION, SOLUTION INTRAVENOUS; SUBCUTANEOUS at 08:58

## 2024-01-01 RX ADMIN — NYSTATIN: 100000 OINTMENT TOPICAL at 20:28

## 2024-01-01 RX ADMIN — METOPROLOL SUCCINATE 25 MG: 25 TABLET, FILM COATED, EXTENDED RELEASE ORAL at 20:18

## 2024-01-01 RX ADMIN — VENLAFAXINE HYDROCHLORIDE 150 MG: 150 CAPSULE, EXTENDED RELEASE ORAL at 07:58

## 2024-01-01 RX ADMIN — LEVOTHYROXINE SODIUM 150 MCG: 75 TABLET ORAL at 10:26

## 2024-01-01 RX ADMIN — DICLOFENAC SODIUM TOPICAL GEL, 1% 2 G: 10 GEL TOPICAL at 15:40

## 2024-01-01 RX ADMIN — ATORVASTATIN CALCIUM 40 MG: 40 TABLET, FILM COATED ORAL at 08:12

## 2024-01-01 RX ADMIN — ENOXAPARIN SODIUM 40 MG: 40 INJECTION SUBCUTANEOUS at 07:57

## 2024-01-01 RX ADMIN — ATORVASTATIN CALCIUM 40 MG: 40 TABLET, FILM COATED ORAL at 09:11

## 2024-01-01 RX ADMIN — ASPIRIN 81 MG: 81 TABLET, COATED ORAL at 07:57

## 2024-01-01 RX ADMIN — ANASTROZOLE 1 MG: 1 TABLET, COATED ORAL at 08:51

## 2024-01-01 RX ADMIN — SENNOSIDES AND DOCUSATE SODIUM 2 TABLET: 50; 8.6 TABLET ORAL at 21:07

## 2024-01-01 RX ADMIN — LOSARTAN POTASSIUM 25 MG: 25 TABLET, FILM COATED ORAL at 09:02

## 2024-01-01 RX ADMIN — ALBUTEROL SULFATE 2 PUFF: 90 AEROSOL, METERED RESPIRATORY (INHALATION) at 08:55

## 2024-01-01 RX ADMIN — LEVOFLOXACIN 750 MG: 750 TABLET, FILM COATED ORAL at 13:48

## 2024-01-01 RX ADMIN — REMDESIVIR 100 MG: 100 INJECTION, POWDER, LYOPHILIZED, FOR SOLUTION INTRAVENOUS at 22:53

## 2024-01-01 RX ADMIN — DICLOFENAC SODIUM TOPICAL GEL, 1% 2 G: 10 GEL TOPICAL at 08:07

## 2024-01-01 RX ADMIN — ALBUTEROL SULFATE 2 PUFF: 90 AEROSOL, METERED RESPIRATORY (INHALATION) at 15:40

## 2024-01-01 RX ADMIN — DICLOFENAC SODIUM TOPICAL GEL, 1% 2 G: 10 GEL TOPICAL at 20:42

## 2024-01-01 RX ADMIN — DICLOFENAC SODIUM TOPICAL GEL, 1% 2 G: 10 GEL TOPICAL at 09:29

## 2024-01-01 RX ADMIN — ENOXAPARIN SODIUM 40 MG: 40 INJECTION SUBCUTANEOUS at 21:16

## 2024-01-01 RX ADMIN — LOSARTAN POTASSIUM 25 MG: 25 TABLET, FILM COATED ORAL at 08:41

## 2024-01-01 RX ADMIN — SODIUM CHLORIDE 50 ML: 9 INJECTION, SOLUTION INTRAVENOUS at 23:16

## 2024-01-01 RX ADMIN — INSULIN ASPART 1 UNITS: 100 INJECTION, SOLUTION INTRAVENOUS; SUBCUTANEOUS at 08:03

## 2024-01-01 RX ADMIN — ANASTROZOLE 1 MG: 1 TABLET, COATED ORAL at 10:46

## 2024-01-01 RX ADMIN — PREGABALIN 100 MG: 100 CAPSULE ORAL at 20:50

## 2024-01-01 RX ADMIN — ANASTROZOLE 1 MG: 1 TABLET, COATED ORAL at 01:02

## 2024-01-01 RX ADMIN — CEFEPIME 2 G: 2 INJECTION, POWDER, FOR SOLUTION INTRAVENOUS at 20:51

## 2024-01-01 RX ADMIN — PREGABALIN 100 MG: 100 CAPSULE ORAL at 07:52

## 2024-01-01 RX ADMIN — BUMETANIDE 4 MG: 2 TABLET ORAL at 09:06

## 2024-01-01 RX ADMIN — POLYETHYLENE GLYCOL 3350 17 G: 17 POWDER, FOR SOLUTION ORAL at 08:07

## 2024-01-01 RX ADMIN — ATORVASTATIN CALCIUM 40 MG: 40 TABLET, FILM COATED ORAL at 09:20

## 2024-01-01 RX ADMIN — INSULIN ASPART 1 UNITS: 100 INJECTION, SOLUTION INTRAVENOUS; SUBCUTANEOUS at 16:57

## 2024-01-01 RX ADMIN — INSULIN ASPART 3 UNITS: 100 INJECTION, SOLUTION INTRAVENOUS; SUBCUTANEOUS at 14:22

## 2024-01-01 RX ADMIN — DICLOFENAC SODIUM TOPICAL GEL, 1% 2 G: 10 GEL TOPICAL at 20:25

## 2024-01-01 RX ADMIN — LEVOTHYROXINE SODIUM 150 MCG: 75 TABLET ORAL at 09:11

## 2024-01-01 RX ADMIN — ATORVASTATIN CALCIUM 40 MG: 40 TABLET, FILM COATED ORAL at 09:46

## 2024-01-01 RX ADMIN — ALBUTEROL SULFATE 2 PUFF: 90 AEROSOL, METERED RESPIRATORY (INHALATION) at 10:47

## 2024-01-01 RX ADMIN — NYSTATIN: 100000 OINTMENT TOPICAL at 19:55

## 2024-01-01 RX ADMIN — MIRABEGRON 25 MG: 25 TABLET, FILM COATED, EXTENDED RELEASE ORAL at 09:06

## 2024-01-01 RX ADMIN — METOPROLOL SUCCINATE 25 MG: 25 TABLET, FILM COATED, EXTENDED RELEASE ORAL at 19:55

## 2024-01-01 RX ADMIN — METOPROLOL SUCCINATE 25 MG: 25 TABLET, FILM COATED, EXTENDED RELEASE ORAL at 20:35

## 2024-01-01 RX ADMIN — INSULIN ASPART 1 UNITS: 100 INJECTION, SOLUTION INTRAVENOUS; SUBCUTANEOUS at 10:27

## 2024-01-01 RX ADMIN — POLYETHYLENE GLYCOL 3350 17 G: 17 POWDER, FOR SOLUTION ORAL at 21:18

## 2024-01-01 RX ADMIN — INSULIN ASPART 1 UNITS: 100 INJECTION, SOLUTION INTRAVENOUS; SUBCUTANEOUS at 09:51

## 2024-01-01 RX ADMIN — VENLAFAXINE HYDROCHLORIDE 150 MG: 150 CAPSULE, EXTENDED RELEASE ORAL at 08:51

## 2024-01-01 RX ADMIN — FEBUXOSTAT 80 MG: 40 TABLET ORAL at 09:24

## 2024-01-01 RX ADMIN — ENOXAPARIN SODIUM 40 MG: 40 INJECTION SUBCUTANEOUS at 19:51

## 2024-01-01 RX ADMIN — INSULIN GLARGINE 5 UNITS: 100 INJECTION, SOLUTION SUBCUTANEOUS at 08:40

## 2024-01-01 RX ADMIN — VENLAFAXINE HYDROCHLORIDE 150 MG: 150 CAPSULE, EXTENDED RELEASE ORAL at 10:45

## 2024-01-01 RX ADMIN — SENNOSIDES AND DOCUSATE SODIUM 2 TABLET: 50; 8.6 TABLET ORAL at 09:24

## 2024-01-01 RX ADMIN — PREGABALIN 100 MG: 100 CAPSULE ORAL at 23:11

## 2024-01-01 RX ADMIN — ALBUTEROL SULFATE 2 PUFF: 90 AEROSOL, METERED RESPIRATORY (INHALATION) at 20:03

## 2024-01-01 RX ADMIN — MICONAZOLE NITRATE: 20 POWDER TOPICAL at 10:37

## 2024-01-01 RX ADMIN — SENNOSIDES AND DOCUSATE SODIUM 2 TABLET: 50; 8.6 TABLET ORAL at 09:44

## 2024-01-01 RX ADMIN — DICLOFENAC SODIUM TOPICAL GEL, 1% 2 G: 10 GEL TOPICAL at 08:41

## 2024-01-01 RX ADMIN — LOSARTAN POTASSIUM 25 MG: 25 TABLET, FILM COATED ORAL at 09:46

## 2024-01-01 RX ADMIN — INSULIN ASPART 1 UNITS: 100 INJECTION, SOLUTION INTRAVENOUS; SUBCUTANEOUS at 13:17

## 2024-01-01 RX ADMIN — ENOXAPARIN SODIUM 40 MG: 40 INJECTION SUBCUTANEOUS at 20:51

## 2024-01-01 RX ADMIN — PREGABALIN 100 MG: 100 CAPSULE ORAL at 10:26

## 2024-01-01 RX ADMIN — INSULIN ASPART 1 UNITS: 100 INJECTION, SOLUTION INTRAVENOUS; SUBCUTANEOUS at 17:05

## 2024-01-01 RX ADMIN — INSULIN ASPART 2 UNITS: 100 INJECTION, SOLUTION INTRAVENOUS; SUBCUTANEOUS at 17:48

## 2024-01-01 RX ADMIN — VENLAFAXINE HYDROCHLORIDE 150 MG: 150 CAPSULE, EXTENDED RELEASE ORAL at 09:24

## 2024-01-01 RX ADMIN — DICLOFENAC SODIUM TOPICAL GEL, 1% 2 G: 10 GEL TOPICAL at 08:44

## 2024-01-01 RX ADMIN — ANASTROZOLE 1 MG: 1 TABLET, COATED ORAL at 08:54

## 2024-01-01 RX ADMIN — VENLAFAXINE HYDROCHLORIDE 150 MG: 150 CAPSULE, EXTENDED RELEASE ORAL at 08:41

## 2024-01-01 RX ADMIN — HEPARIN SODIUM 5000 UNITS: 5000 INJECTION, SOLUTION INTRAVENOUS; SUBCUTANEOUS at 04:30

## 2024-01-01 RX ADMIN — HEPARIN SODIUM 5000 UNITS: 5000 INJECTION, SOLUTION INTRAVENOUS; SUBCUTANEOUS at 22:26

## 2024-01-01 RX ADMIN — INSULIN GLARGINE 5 UNITS: 100 INJECTION, SOLUTION SUBCUTANEOUS at 09:59

## 2024-01-01 RX ADMIN — BUMETANIDE 1 MG: 0.25 INJECTION INTRAMUSCULAR; INTRAVENOUS at 16:19

## 2024-01-01 RX ADMIN — LEVOTHYROXINE SODIUM 150 MCG: 75 TABLET ORAL at 09:20

## 2024-01-01 RX ADMIN — VENLAFAXINE HYDROCHLORIDE 150 MG: 150 CAPSULE, EXTENDED RELEASE ORAL at 08:08

## 2024-01-01 RX ADMIN — ALBUTEROL SULFATE 2 PUFF: 90 AEROSOL, METERED RESPIRATORY (INHALATION) at 13:08

## 2024-01-01 RX ADMIN — LOSARTAN POTASSIUM 25 MG: 25 TABLET, FILM COATED ORAL at 09:20

## 2024-01-01 RX ADMIN — ENOXAPARIN SODIUM 40 MG: 40 INJECTION SUBCUTANEOUS at 20:41

## 2024-01-01 RX ADMIN — FEBUXOSTAT 80 MG: 40 TABLET ORAL at 09:46

## 2024-01-01 RX ADMIN — METOPROLOL SUCCINATE 25 MG: 25 TABLET, FILM COATED, EXTENDED RELEASE ORAL at 23:11

## 2024-01-01 RX ADMIN — NYSTATIN: 100000 OINTMENT TOPICAL at 01:05

## 2024-01-01 RX ADMIN — MICONAZOLE NITRATE: 20 POWDER TOPICAL at 23:33

## 2024-01-01 RX ADMIN — PREGABALIN 100 MG: 100 CAPSULE ORAL at 20:25

## 2024-01-01 RX ADMIN — LEVOTHYROXINE SODIUM 150 MCG: 75 TABLET ORAL at 07:56

## 2024-01-01 RX ADMIN — INSULIN ASPART 2 UNITS: 100 INJECTION, SOLUTION INTRAVENOUS; SUBCUTANEOUS at 13:04

## 2024-01-01 RX ADMIN — VENLAFAXINE HYDROCHLORIDE 150 MG: 150 CAPSULE, EXTENDED RELEASE ORAL at 09:20

## 2024-01-01 RX ADMIN — ENOXAPARIN SODIUM 40 MG: 40 INJECTION SUBCUTANEOUS at 20:11

## 2024-01-01 RX ADMIN — PREGABALIN 100 MG: 100 CAPSULE ORAL at 21:12

## 2024-01-01 RX ADMIN — INSULIN ASPART 2 UNITS: 100 INJECTION, SOLUTION INTRAVENOUS; SUBCUTANEOUS at 12:00

## 2024-01-01 RX ADMIN — INSULIN ASPART 1 UNITS: 100 INJECTION, SOLUTION INTRAVENOUS; SUBCUTANEOUS at 12:24

## 2024-01-01 RX ADMIN — PREGABALIN 100 MG: 100 CAPSULE ORAL at 20:23

## 2024-01-01 RX ADMIN — PREGABALIN 100 MG: 100 CAPSULE ORAL at 09:46

## 2024-01-01 RX ADMIN — MICONAZOLE NITRATE: 20 POWDER TOPICAL at 09:04

## 2024-01-01 RX ADMIN — MICONAZOLE NITRATE: 20 POWDER TOPICAL at 19:58

## 2024-01-01 RX ADMIN — BUMETANIDE 4 MG: 2 TABLET ORAL at 07:57

## 2024-01-01 RX ADMIN — ANASTROZOLE 1 MG: 1 TABLET, COATED ORAL at 09:11

## 2024-01-01 RX ADMIN — SODIUM CHLORIDE 50 ML: 9 INJECTION, SOLUTION INTRAVENOUS at 00:42

## 2024-01-01 RX ADMIN — MICONAZOLE NITRATE: 20 POWDER TOPICAL at 09:46

## 2024-01-01 RX ADMIN — DICLOFENAC SODIUM TOPICAL GEL, 1% 2 G: 10 GEL TOPICAL at 15:29

## 2024-01-01 RX ADMIN — LEVOTHYROXINE SODIUM 150 MCG: 75 TABLET ORAL at 09:46

## 2024-01-01 RX ADMIN — INSULIN ASPART 1 UNITS: 100 INJECTION, SOLUTION INTRAVENOUS; SUBCUTANEOUS at 10:42

## 2024-01-01 RX ADMIN — INSULIN ASPART 1 UNITS: 100 INJECTION, SOLUTION INTRAVENOUS; SUBCUTANEOUS at 18:12

## 2024-01-01 RX ADMIN — PREGABALIN 100 MG: 100 CAPSULE ORAL at 21:16

## 2024-01-01 RX ADMIN — HEPARIN SODIUM 5000 UNITS: 5000 INJECTION, SOLUTION INTRAVENOUS; SUBCUTANEOUS at 19:54

## 2024-01-01 RX ADMIN — INSULIN ASPART 2 UNITS: 100 INJECTION, SOLUTION INTRAVENOUS; SUBCUTANEOUS at 12:09

## 2024-01-01 RX ADMIN — ENOXAPARIN SODIUM 40 MG: 40 INJECTION SUBCUTANEOUS at 08:46

## 2024-01-01 RX ADMIN — HEPARIN SODIUM 5000 UNITS: 5000 INJECTION, SOLUTION INTRAVENOUS; SUBCUTANEOUS at 23:06

## 2024-01-01 RX ADMIN — ATORVASTATIN CALCIUM 40 MG: 40 TABLET, FILM COATED ORAL at 09:25

## 2024-01-01 RX ADMIN — NYSTATIN: 100000 OINTMENT TOPICAL at 23:37

## 2024-01-01 RX ADMIN — POLYETHYLENE GLYCOL 3350 17 G: 17 POWDER, FOR SOLUTION ORAL at 19:51

## 2024-01-01 RX ADMIN — ENOXAPARIN SODIUM 40 MG: 40 INJECTION SUBCUTANEOUS at 20:32

## 2024-01-01 RX ADMIN — MAGNESIUM CITRATE 296 ML: 1.75 LIQUID ORAL at 14:03

## 2024-01-01 RX ADMIN — CALCIUM CARBONATE (ANTACID) CHEW TAB 500 MG 1000 MG: 500 CHEW TAB at 10:10

## 2024-01-01 RX ADMIN — LEVOTHYROXINE SODIUM 150 MCG: 75 TABLET ORAL at 08:08

## 2024-01-01 RX ADMIN — METOPROLOL SUCCINATE 25 MG: 25 TABLET, FILM COATED, EXTENDED RELEASE ORAL at 23:10

## 2024-01-01 RX ADMIN — LEVOTHYROXINE SODIUM 150 MCG: 75 TABLET ORAL at 08:54

## 2024-01-01 RX ADMIN — ATORVASTATIN CALCIUM 40 MG: 40 TABLET, FILM COATED ORAL at 08:39

## 2024-01-01 RX ADMIN — ASPIRIN 81 MG: 81 TABLET, COATED ORAL at 09:20

## 2024-01-01 RX ADMIN — MIRABEGRON 25 MG: 25 TABLET, FILM COATED, EXTENDED RELEASE ORAL at 09:11

## 2024-01-01 RX ADMIN — POLYETHYLENE GLYCOL 3350 17 G: 17 POWDER, FOR SOLUTION ORAL at 13:32

## 2024-01-01 RX ADMIN — LEVOTHYROXINE SODIUM 150 MCG: 75 TABLET ORAL at 08:39

## 2024-01-01 RX ADMIN — METOPROLOL SUCCINATE 25 MG: 25 TABLET, FILM COATED, EXTENDED RELEASE ORAL at 19:51

## 2024-01-01 RX ADMIN — BUMETANIDE 4 MG: 2 TABLET ORAL at 09:46

## 2024-01-01 RX ADMIN — ALBUTEROL SULFATE 2 PUFF: 90 AEROSOL, METERED RESPIRATORY (INHALATION) at 02:19

## 2024-01-01 RX ADMIN — LEVOTHYROXINE SODIUM 150 MCG: 75 TABLET ORAL at 08:51

## 2024-01-01 RX ADMIN — ANASTROZOLE 1 MG: 1 TABLET, COATED ORAL at 07:57

## 2024-01-01 RX ADMIN — INSULIN ASPART 1 UNITS: 100 INJECTION, SOLUTION INTRAVENOUS; SUBCUTANEOUS at 18:14

## 2024-01-01 RX ADMIN — ALBUTEROL SULFATE 2 PUFF: 90 AEROSOL, METERED RESPIRATORY (INHALATION) at 13:48

## 2024-01-01 RX ADMIN — METOPROLOL SUCCINATE 25 MG: 25 TABLET, FILM COATED, EXTENDED RELEASE ORAL at 21:07

## 2024-01-01 RX ADMIN — ANASTROZOLE 1 MG: 1 TABLET, COATED ORAL at 08:08

## 2024-01-01 RX ADMIN — POLYETHYLENE GLYCOL 3350 17 G: 17 POWDER, FOR SOLUTION ORAL at 08:42

## 2024-01-01 RX ADMIN — LOSARTAN POTASSIUM 25 MG: 25 TABLET, FILM COATED ORAL at 10:47

## 2024-01-01 RX ADMIN — ALBUTEROL SULFATE 2 PUFF: 90 AEROSOL, METERED RESPIRATORY (INHALATION) at 09:29

## 2024-01-01 RX ADMIN — INSULIN GLARGINE 5 UNITS: 100 INJECTION, SOLUTION SUBCUTANEOUS at 08:59

## 2024-01-01 RX ADMIN — INSULIN ASPART 1 UNITS: 100 INJECTION, SOLUTION INTRAVENOUS; SUBCUTANEOUS at 07:58

## 2024-01-01 RX ADMIN — ALBUTEROL SULFATE 2 PUFF: 90 AEROSOL, METERED RESPIRATORY (INHALATION) at 13:32

## 2024-01-01 RX ADMIN — ASPIRIN 81 MG: 81 TABLET, COATED ORAL at 08:55

## 2024-01-01 RX ADMIN — HEPARIN SODIUM 5000 UNITS: 5000 INJECTION, SOLUTION INTRAVENOUS; SUBCUTANEOUS at 12:29

## 2024-01-01 RX ADMIN — BUMETANIDE 4 MG: 2 TABLET ORAL at 09:44

## 2024-01-01 RX ADMIN — ASPIRIN 81 MG: 81 TABLET, COATED ORAL at 08:35

## 2024-01-01 RX ADMIN — ALBUTEROL SULFATE 2 PUFF: 90 AEROSOL, METERED RESPIRATORY (INHALATION) at 02:22

## 2024-01-01 RX ADMIN — BUMETANIDE 4 MG: 2 TABLET ORAL at 07:51

## 2024-01-01 RX ADMIN — ANASTROZOLE 1 MG: 1 TABLET, COATED ORAL at 09:20

## 2024-01-01 RX ADMIN — INSULIN ASPART 1 UNITS: 100 INJECTION, SOLUTION INTRAVENOUS; SUBCUTANEOUS at 12:33

## 2024-01-01 RX ADMIN — ENOXAPARIN SODIUM 40 MG: 40 INJECTION SUBCUTANEOUS at 21:14

## 2024-01-01 RX ADMIN — VENLAFAXINE HYDROCHLORIDE 150 MG: 150 CAPSULE, EXTENDED RELEASE ORAL at 09:03

## 2024-01-01 RX ADMIN — HEPARIN SODIUM 5000 UNITS: 5000 INJECTION, SOLUTION INTRAVENOUS; SUBCUTANEOUS at 03:50

## 2024-01-01 RX ADMIN — MIRABEGRON 25 MG: 25 TABLET, FILM COATED, EXTENDED RELEASE ORAL at 09:47

## 2024-01-01 RX ADMIN — ALBUTEROL SULFATE 2 PUFF: 90 AEROSOL, METERED RESPIRATORY (INHALATION) at 08:41

## 2024-01-01 RX ADMIN — INSULIN ASPART 1 UNITS: 100 INJECTION, SOLUTION INTRAVENOUS; SUBCUTANEOUS at 17:37

## 2024-01-01 RX ADMIN — PREGABALIN 100 MG: 100 CAPSULE ORAL at 08:12

## 2024-01-01 RX ADMIN — METOPROLOL SUCCINATE 25 MG: 25 TABLET, FILM COATED, EXTENDED RELEASE ORAL at 21:42

## 2024-01-01 RX ADMIN — BUMETANIDE 4 MG: 2 TABLET ORAL at 08:08

## 2024-01-01 RX ADMIN — ENOXAPARIN SODIUM 40 MG: 40 INJECTION SUBCUTANEOUS at 19:19

## 2024-01-01 RX ADMIN — ALBUTEROL SULFATE 2 PUFF: 90 AEROSOL, METERED RESPIRATORY (INHALATION) at 01:19

## 2024-01-01 RX ADMIN — MICONAZOLE NITRATE: 20 POWDER TOPICAL at 23:36

## 2024-01-01 RX ADMIN — LOSARTAN POTASSIUM 25 MG: 25 TABLET, FILM COATED ORAL at 08:12

## 2024-01-01 RX ADMIN — MICONAZOLE NITRATE: 20 POWDER TOPICAL at 09:26

## 2024-01-01 RX ADMIN — FEBUXOSTAT 80 MG: 40 TABLET ORAL at 09:06

## 2024-01-01 RX ADMIN — POLYETHYLENE GLYCOL 3350 17 G: 17 POWDER, FOR SOLUTION ORAL at 08:44

## 2024-01-01 RX ADMIN — ALBUTEROL SULFATE 2 PUFF: 90 AEROSOL, METERED RESPIRATORY (INHALATION) at 08:42

## 2024-01-01 RX ADMIN — BUMETANIDE 4 MG: 2 TABLET ORAL at 07:56

## 2024-01-01 RX ADMIN — DICLOFENAC SODIUM TOPICAL GEL, 1% 2 G: 10 GEL TOPICAL at 21:42

## 2024-01-01 RX ADMIN — PREGABALIN 100 MG: 100 CAPSULE ORAL at 20:41

## 2024-01-01 RX ADMIN — NYSTATIN: 100000 OINTMENT TOPICAL at 09:14

## 2024-01-01 RX ADMIN — FEBUXOSTAT 80 MG: 40 TABLET ORAL at 10:26

## 2024-01-01 RX ADMIN — MICONAZOLE NITRATE: 20 POWDER TOPICAL at 09:13

## 2024-01-01 RX ADMIN — BUMETANIDE 4 MG: 2 TABLET ORAL at 09:24

## 2024-01-01 RX ADMIN — LOSARTAN POTASSIUM 25 MG: 25 TABLET, FILM COATED ORAL at 09:45

## 2024-01-01 RX ADMIN — PREGABALIN 100 MG: 100 CAPSULE ORAL at 23:10

## 2024-01-01 RX ADMIN — INSULIN ASPART 2 UNITS: 100 INJECTION, SOLUTION INTRAVENOUS; SUBCUTANEOUS at 18:53

## 2024-01-01 RX ADMIN — PREGABALIN 100 MG: 100 CAPSULE ORAL at 19:51

## 2024-01-01 RX ADMIN — HEPARIN SODIUM 5000 UNITS: 5000 INJECTION, SOLUTION INTRAVENOUS; SUBCUTANEOUS at 21:07

## 2024-01-01 RX ADMIN — ALBUTEROL SULFATE 2 PUFF: 90 AEROSOL, METERED RESPIRATORY (INHALATION) at 20:25

## 2024-01-01 RX ADMIN — MIRABEGRON 25 MG: 25 TABLET, FILM COATED, EXTENDED RELEASE ORAL at 09:43

## 2024-01-01 RX ADMIN — DEXAMETHASONE SODIUM PHOSPHATE 6 MG: 10 INJECTION, SOLUTION INTRAMUSCULAR; INTRAVENOUS at 13:28

## 2024-01-01 RX ADMIN — DICLOFENAC SODIUM TOPICAL GEL, 1% 2 G: 10 GEL TOPICAL at 07:52

## 2024-01-01 RX ADMIN — LEVOTHYROXINE SODIUM 150 MCG: 75 TABLET ORAL at 07:51

## 2024-01-01 RX ADMIN — POLYETHYLENE GLYCOL 3350 17 G: 17 POWDER, FOR SOLUTION ORAL at 20:22

## 2024-01-01 RX ADMIN — METOPROLOL TARTRATE 2.5 MG: 5 INJECTION, SOLUTION INTRAVENOUS at 22:27

## 2024-01-01 RX ADMIN — ATORVASTATIN CALCIUM 40 MG: 40 TABLET, FILM COATED ORAL at 08:36

## 2024-01-01 RX ADMIN — MICONAZOLE NITRATE: 20 POWDER TOPICAL at 22:57

## 2024-01-01 RX ADMIN — METOPROLOL SUCCINATE 25 MG: 25 TABLET, FILM COATED, EXTENDED RELEASE ORAL at 20:05

## 2024-01-01 RX ADMIN — ACETAMINOPHEN 1000 MG: 500 TABLET ORAL at 18:43

## 2024-01-01 RX ADMIN — DICLOFENAC SODIUM TOPICAL GEL, 1% 2 G: 10 GEL TOPICAL at 20:44

## 2024-01-01 RX ADMIN — HEPARIN SODIUM 5000 UNITS: 5000 INJECTION, SOLUTION INTRAVENOUS; SUBCUTANEOUS at 04:59

## 2024-01-01 RX ADMIN — ASPIRIN 81 MG: 81 TABLET, COATED ORAL at 08:45

## 2024-01-01 RX ADMIN — HEPARIN SODIUM 5000 UNITS: 5000 INJECTION, SOLUTION INTRAVENOUS; SUBCUTANEOUS at 13:09

## 2024-01-01 RX ADMIN — SODIUM CHLORIDE 50 ML: 9 INJECTION, SOLUTION INTRAVENOUS at 21:43

## 2024-01-01 RX ADMIN — ANASTROZOLE 1 MG: 1 TABLET, COATED ORAL at 07:51

## 2024-01-01 RX ADMIN — ATORVASTATIN CALCIUM 40 MG: 40 TABLET, FILM COATED ORAL at 08:54

## 2024-01-01 RX ADMIN — ENOXAPARIN SODIUM 40 MG: 40 INJECTION SUBCUTANEOUS at 09:20

## 2024-01-01 RX ADMIN — INSULIN ASPART 1 UNITS: 100 INJECTION, SOLUTION INTRAVENOUS; SUBCUTANEOUS at 08:53

## 2024-01-01 RX ADMIN — ENOXAPARIN SODIUM 40 MG: 40 INJECTION SUBCUTANEOUS at 20:42

## 2024-01-01 RX ADMIN — PREGABALIN 100 MG: 100 CAPSULE ORAL at 21:07

## 2024-01-01 RX ADMIN — PREGABALIN 100 MG: 100 CAPSULE ORAL at 08:45

## 2024-01-01 RX ADMIN — VENLAFAXINE HYDROCHLORIDE 150 MG: 150 CAPSULE, EXTENDED RELEASE ORAL at 07:56

## 2024-01-01 RX ADMIN — FEBUXOSTAT 80 MG: 40 TABLET ORAL at 09:12

## 2024-01-01 RX ADMIN — ANASTROZOLE 1 MG: 1 TABLET, COATED ORAL at 09:46

## 2024-01-01 RX ADMIN — DICLOFENAC SODIUM TOPICAL GEL, 1% 2 G: 10 GEL TOPICAL at 14:35

## 2024-01-01 RX ADMIN — METOPROLOL SUCCINATE 25 MG: 25 TABLET, FILM COATED, EXTENDED RELEASE ORAL at 21:16

## 2024-01-01 RX ADMIN — INSULIN ASPART 1 UNITS: 100 INJECTION, SOLUTION INTRAVENOUS; SUBCUTANEOUS at 13:07

## 2024-01-01 RX ADMIN — LEVOTHYROXINE SODIUM 150 MCG: 75 TABLET ORAL at 09:02

## 2024-01-01 RX ADMIN — PREGABALIN 100 MG: 100 CAPSULE ORAL at 09:43

## 2024-01-01 RX ADMIN — ENOXAPARIN SODIUM 40 MG: 40 INJECTION SUBCUTANEOUS at 07:58

## 2024-01-01 RX ADMIN — ANASTROZOLE 1 MG: 1 TABLET, COATED ORAL at 09:07

## 2024-01-01 RX ADMIN — HEPARIN SODIUM 5000 UNITS: 5000 INJECTION, SOLUTION INTRAVENOUS; SUBCUTANEOUS at 05:18

## 2024-01-01 RX ADMIN — INSULIN ASPART 1 UNITS: 100 INJECTION, SOLUTION INTRAVENOUS; SUBCUTANEOUS at 17:18

## 2024-01-01 RX ADMIN — ALBUTEROL SULFATE 2 PUFF: 90 AEROSOL, METERED RESPIRATORY (INHALATION) at 14:17

## 2024-01-01 RX ADMIN — ALBUTEROL SULFATE 2 PUFF: 90 AEROSOL, METERED RESPIRATORY (INHALATION) at 02:58

## 2024-01-01 RX ADMIN — DICLOFENAC SODIUM TOPICAL GEL, 1% 2 G: 10 GEL TOPICAL at 20:51

## 2024-01-01 RX ADMIN — SENNOSIDES AND DOCUSATE SODIUM 2 TABLET: 50; 8.6 TABLET ORAL at 06:44

## 2024-01-01 RX ADMIN — BUMETANIDE 4 MG: 2 TABLET ORAL at 08:35

## 2024-01-01 RX ADMIN — DICLOFENAC SODIUM TOPICAL GEL, 1% 2 G: 10 GEL TOPICAL at 07:58

## 2024-01-01 RX ADMIN — PREGABALIN 100 MG: 100 CAPSULE ORAL at 21:42

## 2024-01-01 RX ADMIN — INSULIN GLARGINE 5 UNITS: 100 INJECTION, SOLUTION SUBCUTANEOUS at 14:12

## 2024-01-01 RX ADMIN — METOPROLOL SUCCINATE 25 MG: 25 TABLET, FILM COATED, EXTENDED RELEASE ORAL at 22:26

## 2024-01-01 RX ADMIN — BUMETANIDE 4 MG: 2 TABLET ORAL at 08:50

## 2024-01-01 RX ADMIN — ASPIRIN 81 MG: 81 TABLET, COATED ORAL at 10:45

## 2024-01-01 RX ADMIN — ATORVASTATIN CALCIUM 40 MG: 40 TABLET, FILM COATED ORAL at 07:51

## 2024-01-01 RX ADMIN — ASPIRIN 81 MG: 81 TABLET, COATED ORAL at 09:25

## 2024-01-01 RX ADMIN — ANASTROZOLE 1 MG: 1 TABLET, COATED ORAL at 08:36

## 2024-01-01 RX ADMIN — BUMETANIDE 4 MG: 2 TABLET ORAL at 08:46

## 2024-01-01 RX ADMIN — VENLAFAXINE HYDROCHLORIDE 150 MG: 150 CAPSULE, EXTENDED RELEASE ORAL at 08:46

## 2024-01-01 RX ADMIN — NYSTATIN: 100000 OINTMENT TOPICAL at 09:26

## 2024-01-01 RX ADMIN — INSULIN ASPART 2 UNITS: 100 INJECTION, SOLUTION INTRAVENOUS; SUBCUTANEOUS at 13:30

## 2024-01-01 RX ADMIN — LEVOTHYROXINE SODIUM 150 MCG: 75 TABLET ORAL at 08:36

## 2024-01-01 RX ADMIN — DICLOFENAC SODIUM TOPICAL GEL, 1% 2 G: 10 GEL TOPICAL at 14:17

## 2024-01-01 RX ADMIN — INSULIN ASPART 2 UNITS: 100 INJECTION, SOLUTION INTRAVENOUS; SUBCUTANEOUS at 11:34

## 2024-01-01 RX ADMIN — BUMETANIDE 4 MG: 2 TABLET ORAL at 08:40

## 2024-01-01 RX ADMIN — ALBUTEROL SULFATE 2 PUFF: 90 AEROSOL, METERED RESPIRATORY (INHALATION) at 19:19

## 2024-01-01 RX ADMIN — LOSARTAN POTASSIUM 25 MG: 25 TABLET, FILM COATED ORAL at 08:54

## 2024-01-01 RX ADMIN — NYSTATIN: 100000 OINTMENT TOPICAL at 09:05

## 2024-01-01 RX ADMIN — ATORVASTATIN CALCIUM 40 MG: 40 TABLET, FILM COATED ORAL at 10:26

## 2024-01-01 RX ADMIN — INSULIN ASPART 1 UNITS: 100 INJECTION, SOLUTION INTRAVENOUS; SUBCUTANEOUS at 18:27

## 2024-01-01 RX ADMIN — PREGABALIN 100 MG: 100 CAPSULE ORAL at 08:35

## 2024-01-01 RX ADMIN — POLYETHYLENE GLYCOL 3350 17 G: 17 POWDER, FOR SOLUTION ORAL at 10:47

## 2024-01-01 RX ADMIN — BUMETANIDE 4 MG: 0.25 INJECTION INTRAMUSCULAR; INTRAVENOUS at 23:30

## 2024-01-01 RX ADMIN — ALBUTEROL SULFATE 2 PUFF: 90 AEROSOL, METERED RESPIRATORY (INHALATION) at 20:06

## 2024-01-01 RX ADMIN — PREGABALIN 100 MG: 100 CAPSULE ORAL at 07:57

## 2024-01-01 RX ADMIN — ATORVASTATIN CALCIUM 40 MG: 40 TABLET, FILM COATED ORAL at 07:58

## 2024-01-01 RX ADMIN — PREGABALIN 100 MG: 100 CAPSULE ORAL at 09:25

## 2024-01-01 RX ADMIN — SODIUM CHLORIDE 50 ML: 9 INJECTION, SOLUTION INTRAVENOUS at 21:26

## 2024-01-01 RX ADMIN — PREGABALIN 100 MG: 100 CAPSULE ORAL at 07:58

## 2024-01-01 RX ADMIN — ALBUTEROL SULFATE 2 PUFF: 90 AEROSOL, METERED RESPIRATORY (INHALATION) at 07:52

## 2024-01-01 RX ADMIN — METOPROLOL SUCCINATE 25 MG: 25 TABLET, FILM COATED, EXTENDED RELEASE ORAL at 20:11

## 2024-01-01 RX ADMIN — BUMETANIDE 4 MG: 2 TABLET ORAL at 10:46

## 2024-01-01 RX ADMIN — SENNOSIDES AND DOCUSATE SODIUM 1 TABLET: 50; 8.6 TABLET ORAL at 09:01

## 2024-01-01 RX ADMIN — HEPARIN SODIUM 5000 UNITS: 5000 INJECTION, SOLUTION INTRAVENOUS; SUBCUTANEOUS at 12:37

## 2024-01-01 RX ADMIN — VENLAFAXINE HYDROCHLORIDE 150 MG: 150 CAPSULE, EXTENDED RELEASE ORAL at 10:27

## 2024-01-01 RX ADMIN — BUMETANIDE 4 MG: 2 TABLET ORAL at 08:54

## 2024-01-01 RX ADMIN — DICLOFENAC SODIUM TOPICAL GEL, 1% 2 G: 10 GEL TOPICAL at 19:19

## 2024-01-01 RX ADMIN — LOSARTAN POTASSIUM 25 MG: 25 TABLET, FILM COATED ORAL at 07:51

## 2024-01-01 RX ADMIN — LEVOTHYROXINE SODIUM 150 MCG: 75 TABLET ORAL at 09:24

## 2024-01-01 RX ADMIN — BUMETANIDE 4 MG: 2 TABLET ORAL at 08:11

## 2024-01-01 RX ADMIN — DICLOFENAC SODIUM TOPICAL GEL, 1% 2 G: 10 GEL TOPICAL at 13:48

## 2024-01-01 RX ADMIN — ATORVASTATIN CALCIUM 40 MG: 40 TABLET, FILM COATED ORAL at 08:50

## 2024-01-01 RX ADMIN — PREGABALIN 100 MG: 100 CAPSULE ORAL at 20:06

## 2024-01-01 RX ADMIN — METOPROLOL SUCCINATE 25 MG: 25 TABLET, FILM COATED, EXTENDED RELEASE ORAL at 20:23

## 2024-01-01 RX ADMIN — CEFTRIAXONE SODIUM 2 G: 2 INJECTION, POWDER, FOR SOLUTION INTRAMUSCULAR; INTRAVENOUS at 17:44

## 2024-01-01 RX ADMIN — LOSARTAN POTASSIUM 25 MG: 25 TABLET, FILM COATED ORAL at 10:26

## 2024-01-01 RX ADMIN — PREGABALIN 100 MG: 100 CAPSULE ORAL at 08:36

## 2024-01-01 RX ADMIN — VENLAFAXINE HYDROCHLORIDE 150 MG: 150 CAPSULE, EXTENDED RELEASE ORAL at 09:46

## 2024-01-01 RX ADMIN — ANASTROZOLE 1 MG: 1 TABLET, COATED ORAL at 08:12

## 2024-01-01 RX ADMIN — PREGABALIN 100 MG: 100 CAPSULE ORAL at 10:45

## 2024-01-01 RX ADMIN — POLYETHYLENE GLYCOL 3350 17 G: 17 POWDER, FOR SOLUTION ORAL at 09:23

## 2024-01-01 RX ADMIN — ENOXAPARIN SODIUM 40 MG: 40 INJECTION SUBCUTANEOUS at 21:42

## 2024-01-01 RX ADMIN — HEPARIN SODIUM 5000 UNITS: 5000 INJECTION, SOLUTION INTRAVENOUS; SUBCUTANEOUS at 22:56

## 2024-01-01 RX ADMIN — ENOXAPARIN SODIUM 40 MG: 40 INJECTION SUBCUTANEOUS at 08:12

## 2024-01-01 RX ADMIN — REMDESIVIR 200 MG: 100 INJECTION, POWDER, LYOPHILIZED, FOR SOLUTION INTRAVENOUS at 23:27

## 2024-01-01 RX ADMIN — ATORVASTATIN CALCIUM 40 MG: 40 TABLET, FILM COATED ORAL at 10:46

## 2024-01-01 RX ADMIN — NYSTATIN: 100000 OINTMENT TOPICAL at 22:30

## 2024-01-01 RX ADMIN — ASPIRIN 81 MG: 81 TABLET, COATED ORAL at 08:08

## 2024-01-01 RX ADMIN — INSULIN GLARGINE 5 UNITS: 100 INJECTION, SOLUTION SUBCUTANEOUS at 08:07

## 2024-01-01 RX ADMIN — CALCIUM CARBONATE (ANTACID) CHEW TAB 500 MG 1000 MG: 500 CHEW TAB at 18:43

## 2024-01-01 RX ADMIN — REMDESIVIR 100 MG: 100 INJECTION, POWDER, LYOPHILIZED, FOR SOLUTION INTRAVENOUS at 23:15

## 2024-01-01 RX ADMIN — LOSARTAN POTASSIUM 25 MG: 25 TABLET, FILM COATED ORAL at 07:57

## 2024-01-01 RX ADMIN — LEVOTHYROXINE SODIUM 150 MCG: 75 TABLET ORAL at 08:46

## 2024-01-01 RX ADMIN — INSULIN ASPART 1 UNITS: 100 INJECTION, SOLUTION INTRAVENOUS; SUBCUTANEOUS at 18:11

## 2024-01-01 RX ADMIN — NYSTATIN: 100000 OINTMENT TOPICAL at 17:07

## 2024-01-01 RX ADMIN — ASPIRIN 81 MG: 81 TABLET, COATED ORAL at 10:27

## 2024-01-01 RX ADMIN — INSULIN ASPART 1 UNITS: 100 INJECTION, SOLUTION INTRAVENOUS; SUBCUTANEOUS at 10:36

## 2024-01-01 RX ADMIN — POLYETHYLENE GLYCOL 3350 17 G: 17 POWDER, FOR SOLUTION ORAL at 08:14

## 2024-01-01 RX ADMIN — PREGABALIN 100 MG: 100 CAPSULE ORAL at 08:40

## 2024-01-01 RX ADMIN — PREGABALIN 100 MG: 100 CAPSULE ORAL at 22:26

## 2024-01-01 RX ADMIN — LEVOTHYROXINE SODIUM 150 MCG: 75 TABLET ORAL at 09:44

## 2024-01-01 RX ADMIN — HEPARIN SODIUM 5000 UNITS: 5000 INJECTION, SOLUTION INTRAVENOUS; SUBCUTANEOUS at 11:25

## 2024-01-01 RX ADMIN — PREGABALIN 100 MG: 100 CAPSULE ORAL at 19:19

## 2024-01-01 RX ADMIN — ALBUTEROL SULFATE 2 PUFF: 90 AEROSOL, METERED RESPIRATORY (INHALATION) at 08:39

## 2024-01-01 RX ADMIN — MICONAZOLE NITRATE: 20 POWDER TOPICAL at 19:56

## 2024-01-01 RX ADMIN — HEPARIN SODIUM 5000 UNITS: 5000 INJECTION, SOLUTION INTRAVENOUS; SUBCUTANEOUS at 12:09

## 2024-01-01 RX ADMIN — HEPARIN SODIUM 5000 UNITS: 5000 INJECTION, SOLUTION INTRAVENOUS; SUBCUTANEOUS at 11:40

## 2024-01-01 RX ADMIN — ASPIRIN 81 MG: 81 TABLET, COATED ORAL at 09:02

## 2024-01-01 RX ADMIN — HEPARIN SODIUM 5000 UNITS: 5000 INJECTION, SOLUTION INTRAVENOUS; SUBCUTANEOUS at 20:18

## 2024-01-01 RX ADMIN — DICLOFENAC SODIUM TOPICAL GEL, 1% 2 G: 10 GEL TOPICAL at 08:13

## 2024-01-01 RX ADMIN — ANASTROZOLE 1 MG: 1 TABLET, COATED ORAL at 10:26

## 2024-01-01 RX ADMIN — INSULIN ASPART 1 UNITS: 100 INJECTION, SOLUTION INTRAVENOUS; SUBCUTANEOUS at 09:43

## 2024-01-01 RX ADMIN — ATORVASTATIN CALCIUM 40 MG: 40 TABLET, FILM COATED ORAL at 08:08

## 2024-01-01 RX ADMIN — CEFEPIME 2 G: 2 INJECTION, POWDER, FOR SOLUTION INTRAVENOUS at 22:01

## 2024-01-01 RX ADMIN — DICLOFENAC SODIUM TOPICAL GEL, 1% 2 G: 10 GEL TOPICAL at 20:06

## 2024-01-01 RX ADMIN — ENOXAPARIN SODIUM 40 MG: 40 INJECTION SUBCUTANEOUS at 08:55

## 2024-01-01 RX ADMIN — POLYETHYLENE GLYCOL 3350 17 G: 17 POWDER, FOR SOLUTION ORAL at 20:41

## 2024-01-01 RX ADMIN — LOSARTAN POTASSIUM 25 MG: 25 TABLET, FILM COATED ORAL at 08:45

## 2024-01-01 RX ADMIN — ALBUTEROL SULFATE 2 PUFF: 90 AEROSOL, METERED RESPIRATORY (INHALATION) at 08:12

## 2024-01-01 RX ADMIN — BUMETANIDE 4 MG: 0.25 INJECTION INTRAMUSCULAR; INTRAVENOUS at 11:24

## 2024-01-01 RX ADMIN — VENLAFAXINE HYDROCHLORIDE 150 MG: 150 CAPSULE, EXTENDED RELEASE ORAL at 17:07

## 2024-01-01 RX ADMIN — ENOXAPARIN SODIUM 40 MG: 40 INJECTION SUBCUTANEOUS at 20:35

## 2024-01-01 RX ADMIN — HEPARIN SODIUM 5000 UNITS: 5000 INJECTION, SOLUTION INTRAVENOUS; SUBCUTANEOUS at 05:20

## 2024-01-01 RX ADMIN — ENOXAPARIN SODIUM 40 MG: 40 INJECTION SUBCUTANEOUS at 08:36

## 2024-01-01 RX ADMIN — MICONAZOLE NITRATE: 20 POWDER TOPICAL at 21:16

## 2024-01-01 RX ADMIN — ASPIRIN 81 MG: 81 TABLET, COATED ORAL at 09:46

## 2024-01-01 RX ADMIN — ANASTROZOLE 1 MG: 1 TABLET, COATED ORAL at 08:41

## 2024-01-01 RX ADMIN — ACETAMINOPHEN 1000 MG: 500 TABLET ORAL at 20:41

## 2024-01-01 RX ADMIN — PREGABALIN 100 MG: 100 CAPSULE ORAL at 20:11

## 2024-01-01 RX ADMIN — ACETAMINOPHEN 1000 MG: 500 TABLET ORAL at 10:10

## 2024-01-01 RX ADMIN — MICONAZOLE NITRATE: 20 POWDER TOPICAL at 09:19

## 2024-01-01 RX ADMIN — ATORVASTATIN CALCIUM 40 MG: 40 TABLET, FILM COATED ORAL at 09:45

## 2024-01-01 RX ADMIN — MICONAZOLE NITRATE: 20 POWDER TOPICAL at 22:29

## 2024-01-01 RX ADMIN — SENNOSIDES AND DOCUSATE SODIUM 2 TABLET: 50; 8.6 TABLET ORAL at 19:54

## 2024-01-01 RX ADMIN — MICONAZOLE NITRATE: 20 POWDER TOPICAL at 10:33

## 2024-01-01 RX ADMIN — PREGABALIN 100 MG: 100 CAPSULE ORAL at 20:43

## 2024-01-01 RX ADMIN — SODIUM CHLORIDE 500 ML: 9 INJECTION, SOLUTION INTRAVENOUS at 01:26

## 2024-01-01 RX ADMIN — LOSARTAN POTASSIUM 25 MG: 25 TABLET, FILM COATED ORAL at 08:40

## 2024-01-01 RX ADMIN — SENNOSIDES AND DOCUSATE SODIUM 2 TABLET: 50; 8.6 TABLET ORAL at 23:11

## 2024-01-01 RX ADMIN — ASPIRIN 81 MG: 81 TABLET, COATED ORAL at 09:11

## 2024-01-01 RX ADMIN — VENLAFAXINE HYDROCHLORIDE 150 MG: 150 CAPSULE, EXTENDED RELEASE ORAL at 09:11

## 2024-01-01 RX ADMIN — CEFTRIAXONE SODIUM 2 G: 2 INJECTION, POWDER, FOR SOLUTION INTRAMUSCULAR; INTRAVENOUS at 17:43

## 2024-01-01 RX ADMIN — FEBUXOSTAT 80 MG: 40 TABLET ORAL at 09:45

## 2024-01-01 RX ADMIN — DICLOFENAC SODIUM TOPICAL GEL, 1% 2 G: 10 GEL TOPICAL at 21:19

## 2024-01-01 RX ADMIN — INSULIN GLARGINE 5 UNITS: 100 INJECTION, SOLUTION SUBCUTANEOUS at 08:08

## 2024-01-01 RX ADMIN — BUMETANIDE 4 MG: 2 TABLET ORAL at 09:20

## 2024-01-01 RX ADMIN — ALBUTEROL SULFATE 2 PUFF: 90 AEROSOL, METERED RESPIRATORY (INHALATION) at 20:17

## 2024-01-01 RX ADMIN — REMDESIVIR 100 MG: 100 INJECTION, POWDER, LYOPHILIZED, FOR SOLUTION INTRAVENOUS at 21:07

## 2024-01-01 RX ADMIN — PREGABALIN 100 MG: 100 CAPSULE ORAL at 09:12

## 2024-01-01 RX ADMIN — ASPIRIN 81 MG: 81 TABLET, COATED ORAL at 07:51

## 2024-01-01 RX ADMIN — HUMAN ALBUMIN MICROSPHERES AND PERFLUTREN 6 ML: 10; .22 INJECTION, SOLUTION INTRAVENOUS at 11:20

## 2024-01-01 RX ADMIN — DICLOFENAC SODIUM TOPICAL GEL, 1% 2 G: 10 GEL TOPICAL at 13:32

## 2024-01-01 RX ADMIN — VENLAFAXINE HYDROCHLORIDE 150 MG: 150 CAPSULE, EXTENDED RELEASE ORAL at 07:51

## 2024-01-01 RX ADMIN — ENOXAPARIN SODIUM 40 MG: 40 INJECTION SUBCUTANEOUS at 20:05

## 2024-01-01 RX ADMIN — DICLOFENAC SODIUM TOPICAL GEL, 1% 2 G: 10 GEL TOPICAL at 08:40

## 2024-01-01 RX ADMIN — INSULIN ASPART 1 UNITS: 100 INJECTION, SOLUTION INTRAVENOUS; SUBCUTANEOUS at 08:12

## 2024-01-01 RX ADMIN — ALBUTEROL SULFATE 2 PUFF: 90 AEROSOL, METERED RESPIRATORY (INHALATION) at 21:11

## 2024-01-01 RX ADMIN — CEPHALEXIN 500 MG: 500 CAPSULE ORAL at 02:50

## 2024-01-01 RX ADMIN — PREGABALIN 100 MG: 100 CAPSULE ORAL at 19:55

## 2024-01-01 RX ADMIN — INSULIN ASPART 1 UNITS: 100 INJECTION, SOLUTION INTRAVENOUS; SUBCUTANEOUS at 13:06

## 2024-01-01 RX ADMIN — INSULIN ASPART 1 UNITS: 100 INJECTION, SOLUTION INTRAVENOUS; SUBCUTANEOUS at 09:10

## 2024-01-01 RX ADMIN — LEVOTHYROXINE SODIUM 150 MCG: 75 TABLET ORAL at 07:58

## 2024-01-01 RX ADMIN — LEVOTHYROXINE SODIUM 150 MCG: 75 TABLET ORAL at 08:35

## 2024-01-01 RX ADMIN — DICLOFENAC SODIUM TOPICAL GEL, 1% 2 G: 10 GEL TOPICAL at 21:11

## 2024-01-01 RX ADMIN — DICLOFENAC SODIUM TOPICAL GEL, 1% 2 G: 10 GEL TOPICAL at 13:18

## 2024-01-01 RX ADMIN — INSULIN ASPART 1 UNITS: 100 INJECTION, SOLUTION INTRAVENOUS; SUBCUTANEOUS at 14:22

## 2024-01-01 RX ADMIN — PREGABALIN 100 MG: 100 CAPSULE ORAL at 20:18

## 2024-01-01 RX ADMIN — SENNOSIDES AND DOCUSATE SODIUM 2 TABLET: 50; 8.6 TABLET ORAL at 19:52

## 2024-01-01 RX ADMIN — ASPIRIN 81 MG: 81 TABLET, COATED ORAL at 17:41

## 2024-01-01 RX ADMIN — POLYETHYLENE GLYCOL 3350 17 G: 17 POWDER, FOR SOLUTION ORAL at 20:35

## 2024-01-01 RX ADMIN — MAGNESIUM SULFATE HEPTAHYDRATE 2 G: 40 INJECTION, SOLUTION INTRAVENOUS at 11:25

## 2024-01-01 RX ADMIN — DICLOFENAC SODIUM TOPICAL GEL, 1% 2 G: 10 GEL TOPICAL at 08:06

## 2024-01-01 RX ADMIN — REMDESIVIR 100 MG: 100 INJECTION, POWDER, LYOPHILIZED, FOR SOLUTION INTRAVENOUS at 19:53

## 2024-01-01 RX ADMIN — ASPIRIN 81 MG: 81 TABLET, COATED ORAL at 08:51

## 2024-01-01 RX ADMIN — LOSARTAN POTASSIUM 25 MG: 25 TABLET, FILM COATED ORAL at 08:51

## 2024-01-01 RX ADMIN — NYSTATIN: 100000 OINTMENT TOPICAL at 10:34

## 2024-01-01 RX ADMIN — ALBUTEROL SULFATE 2 PUFF: 90 AEROSOL, METERED RESPIRATORY (INHALATION) at 20:35

## 2024-01-01 RX ADMIN — SODIUM CHLORIDE: 0.9 INJECTION, SOLUTION INTRAVENOUS at 18:14

## 2024-01-01 RX ADMIN — POLYETHYLENE GLYCOL 3350 17 G: 17 POWDER, FOR SOLUTION ORAL at 20:05

## 2024-01-01 RX ADMIN — ENOXAPARIN SODIUM 40 MG: 40 INJECTION SUBCUTANEOUS at 10:44

## 2024-01-01 RX ADMIN — NYSTATIN: 100000 OINTMENT TOPICAL at 19:59

## 2024-01-01 RX ADMIN — POLYETHYLENE GLYCOL 3350 17 G: 17 POWDER, FOR SOLUTION ORAL at 20:11

## 2024-01-01 RX ADMIN — NYSTATIN: 100000 OINTMENT TOPICAL at 10:36

## 2024-01-01 RX ADMIN — ENOXAPARIN SODIUM 40 MG: 40 INJECTION SUBCUTANEOUS at 08:42

## 2024-01-01 RX ADMIN — ALBUTEROL SULFATE 2 PUFF: 90 AEROSOL, METERED RESPIRATORY (INHALATION) at 13:07

## 2024-01-01 RX ADMIN — METOPROLOL SUCCINATE 25 MG: 25 TABLET, FILM COATED, EXTENDED RELEASE ORAL at 19:19

## 2024-01-01 RX ADMIN — SENNOSIDES AND DOCUSATE SODIUM 1 TABLET: 50; 8.6 TABLET ORAL at 23:10

## 2024-01-01 RX ADMIN — INSULIN ASPART 1 UNITS: 100 INJECTION, SOLUTION INTRAVENOUS; SUBCUTANEOUS at 18:33

## 2024-01-01 RX ADMIN — INSULIN ASPART 1 UNITS: 100 INJECTION, SOLUTION INTRAVENOUS; SUBCUTANEOUS at 08:07

## 2024-01-01 RX ADMIN — ANASTROZOLE 1 MG: 1 TABLET, COATED ORAL at 08:45

## 2024-01-01 RX ADMIN — LOSARTAN POTASSIUM 25 MG: 25 TABLET, FILM COATED ORAL at 09:24

## 2024-01-01 RX ADMIN — SENNOSIDES AND DOCUSATE SODIUM 2 TABLET: 50; 8.6 TABLET ORAL at 20:18

## 2024-01-01 RX ADMIN — ALBUTEROL SULFATE 2 PUFF: 90 AEROSOL, METERED RESPIRATORY (INHALATION) at 14:15

## 2024-01-01 RX ADMIN — INSULIN ASPART 1 UNITS: 100 INJECTION, SOLUTION INTRAVENOUS; SUBCUTANEOUS at 18:22

## 2024-01-01 RX ADMIN — BUMETANIDE 4 MG: 2 TABLET ORAL at 12:24

## 2024-01-01 RX ADMIN — BUMETANIDE 4 MG: 2 TABLET ORAL at 08:36

## 2024-01-01 RX ADMIN — VENLAFAXINE HYDROCHLORIDE 150 MG: 150 CAPSULE, EXTENDED RELEASE ORAL at 08:36

## 2024-01-01 RX ADMIN — INSULIN ASPART 1 UNITS: 100 INJECTION, SOLUTION INTRAVENOUS; SUBCUTANEOUS at 18:01

## 2024-01-01 RX ADMIN — CEFTRIAXONE SODIUM 1 G: 1 INJECTION, POWDER, FOR SOLUTION INTRAMUSCULAR; INTRAVENOUS at 23:04

## 2024-01-01 RX ADMIN — HEPARIN SODIUM 5000 UNITS: 5000 INJECTION, SOLUTION INTRAVENOUS; SUBCUTANEOUS at 12:42

## 2024-01-01 RX ADMIN — ENOXAPARIN SODIUM 40 MG: 40 INJECTION SUBCUTANEOUS at 08:07

## 2024-01-01 RX ADMIN — MICONAZOLE NITRATE: 20 POWDER TOPICAL at 09:47

## 2024-01-01 RX ADMIN — HEPARIN SODIUM 5000 UNITS: 5000 INJECTION, SOLUTION INTRAVENOUS; SUBCUTANEOUS at 04:51

## 2024-01-01 RX ADMIN — ASPIRIN 81 MG: 81 TABLET, COATED ORAL at 08:12

## 2024-01-01 ASSESSMENT — ACTIVITIES OF DAILY LIVING (ADL)
ADLS_ACUITY_SCORE: 65
ADLS_ACUITY_SCORE: 69
ADLS_ACUITY_SCORE: 69
ADLS_ACUITY_SCORE: 63
ADLS_ACUITY_SCORE: 67
ADLS_ACUITY_SCORE: 63
ADLS_ACUITY_SCORE: 41
ADLS_ACUITY_SCORE: 67
ADLS_ACUITY_SCORE: 41
ADLS_ACUITY_SCORE: 71
ADLS_ACUITY_SCORE: 69
ADLS_ACUITY_SCORE: 67
ADLS_ACUITY_SCORE: 65
ADLS_ACUITY_SCORE: 65
ADLS_ACUITY_SCORE: 41
ADLS_ACUITY_SCORE: 65
ADLS_ACUITY_SCORE: 67
ADLS_ACUITY_SCORE: 65
ADLS_ACUITY_SCORE: 67
ADLS_ACUITY_SCORE: 63
ADLS_ACUITY_SCORE: 69
ADLS_ACUITY_SCORE: 65
ADLS_ACUITY_SCORE: 63
ADLS_ACUITY_SCORE: 67
ADLS_ACUITY_SCORE: 67
ADLS_ACUITY_SCORE: 53
ADLS_ACUITY_SCORE: 67
ADLS_ACUITY_SCORE: 69
ADLS_ACUITY_SCORE: 67
ADLS_ACUITY_SCORE: 67
ADLS_ACUITY_SCORE: 53
ADLS_ACUITY_SCORE: 65
ADLS_ACUITY_SCORE: 69
ADLS_ACUITY_SCORE: 69
ADLS_ACUITY_SCORE: 63
ADLS_ACUITY_SCORE: 41
ADLS_ACUITY_SCORE: 69
ADLS_ACUITY_SCORE: 65
ADLS_ACUITY_SCORE: 65
ADLS_ACUITY_SCORE: 69
ADLS_ACUITY_SCORE: 65
ADLS_ACUITY_SCORE: 69
ADLS_ACUITY_SCORE: 65
ADLS_ACUITY_SCORE: 41
ADLS_ACUITY_SCORE: 67
ADLS_ACUITY_SCORE: 69
ADLS_ACUITY_SCORE: 67
ADLS_ACUITY_SCORE: 65
ADLS_ACUITY_SCORE: 71
ADLS_ACUITY_SCORE: 69
ADLS_ACUITY_SCORE: 51
ADLS_ACUITY_SCORE: 67
ADLS_ACUITY_SCORE: 67
ADLS_ACUITY_SCORE: 69
ADLS_ACUITY_SCORE: 67
ADLS_ACUITY_SCORE: 67
ADLS_ACUITY_SCORE: 69
ADLS_ACUITY_SCORE: 65
ADLS_ACUITY_SCORE: 65
ADLS_ACUITY_SCORE: 69
ADLS_ACUITY_SCORE: 69
ADLS_ACUITY_SCORE: 67
ADLS_ACUITY_SCORE: 67
ADLS_ACUITY_SCORE: 63
ADLS_ACUITY_SCORE: 41
ADLS_ACUITY_SCORE: 63
ADLS_ACUITY_SCORE: 67
ADLS_ACUITY_SCORE: 67
ADLS_ACUITY_SCORE: 71
ADLS_ACUITY_SCORE: 69
ADLS_ACUITY_SCORE: 41
ADLS_ACUITY_SCORE: 69
ADLS_ACUITY_SCORE: 67
ADLS_ACUITY_SCORE: 63
DEPENDENT_IADLS:: CLEANING;COOKING;LAUNDRY;SHOPPING;MEAL PREPARATION;MEDICATION MANAGEMENT;MONEY MANAGEMENT;TRANSPORTATION
ADLS_ACUITY_SCORE: 65
ADLS_ACUITY_SCORE: 67
ADLS_ACUITY_SCORE: 69
DEPENDENT_IADLS:: CLEANING;COOKING;LAUNDRY;SHOPPING;MEAL PREPARATION;MEDICATION MANAGEMENT;MONEY MANAGEMENT;TRANSPORTATION
ADLS_ACUITY_SCORE: 63
ADLS_ACUITY_SCORE: 69
ADLS_ACUITY_SCORE: 67
ADLS_ACUITY_SCORE: 41
ADLS_ACUITY_SCORE: 67
ADLS_ACUITY_SCORE: 69
ADLS_ACUITY_SCORE: 65
ADLS_ACUITY_SCORE: 69
ADLS_ACUITY_SCORE: 63
ADLS_ACUITY_SCORE: 51
ADLS_ACUITY_SCORE: 69
ADLS_ACUITY_SCORE: 67
ADLS_ACUITY_SCORE: 65
ADLS_ACUITY_SCORE: 69
ADLS_ACUITY_SCORE: 67
ADLS_ACUITY_SCORE: 63
ADLS_ACUITY_SCORE: 69
ADLS_ACUITY_SCORE: 69
ADLS_ACUITY_SCORE: 67
ADLS_ACUITY_SCORE: 69
ADLS_ACUITY_SCORE: 65
ADLS_ACUITY_SCORE: 63
ADLS_ACUITY_SCORE: 67
ADLS_ACUITY_SCORE: 55
ADLS_ACUITY_SCORE: 69
ADLS_ACUITY_SCORE: 67
ADLS_ACUITY_SCORE: 63
ADLS_ACUITY_SCORE: 71
ADLS_ACUITY_SCORE: 65
ADLS_ACUITY_SCORE: 53
ADLS_ACUITY_SCORE: 65
ADLS_ACUITY_SCORE: 67
ADLS_ACUITY_SCORE: 69
ADLS_ACUITY_SCORE: 69
ADLS_ACUITY_SCORE: 65
ADLS_ACUITY_SCORE: 67
ADLS_ACUITY_SCORE: 63
ADLS_ACUITY_SCORE: 69
ADLS_ACUITY_SCORE: 67
ADLS_ACUITY_SCORE: 69
ADLS_ACUITY_SCORE: 63
ADLS_ACUITY_SCORE: 69
ADLS_ACUITY_SCORE: 67
ADLS_ACUITY_SCORE: 67
ADLS_ACUITY_SCORE: 69
ADLS_ACUITY_SCORE: 67
ADLS_ACUITY_SCORE: 65
ADLS_ACUITY_SCORE: 65
ADLS_ACUITY_SCORE: 69
ADLS_ACUITY_SCORE: 63
ADLS_ACUITY_SCORE: 49
ADLS_ACUITY_SCORE: 65
ADLS_ACUITY_SCORE: 67
ADLS_ACUITY_SCORE: 41
ADLS_ACUITY_SCORE: 67
ADLS_ACUITY_SCORE: 65
ADLS_ACUITY_SCORE: 67
ADLS_ACUITY_SCORE: 69
ADLS_ACUITY_SCORE: 53
ADLS_ACUITY_SCORE: 69
ADLS_ACUITY_SCORE: 65
ADLS_ACUITY_SCORE: 41
ADLS_ACUITY_SCORE: 69
ADLS_ACUITY_SCORE: 67
ADLS_ACUITY_SCORE: 53
ADLS_ACUITY_SCORE: 69
ADLS_ACUITY_SCORE: 41
ADLS_ACUITY_SCORE: 67
ADLS_ACUITY_SCORE: 65
ADLS_ACUITY_SCORE: 69
ADLS_ACUITY_SCORE: 41
ADLS_ACUITY_SCORE: 69
ADLS_ACUITY_SCORE: 67
ADLS_ACUITY_SCORE: 69
ADLS_ACUITY_SCORE: 41
ADLS_ACUITY_SCORE: 63
ADLS_ACUITY_SCORE: 67
ADLS_ACUITY_SCORE: 63
ADLS_ACUITY_SCORE: 63
ADLS_ACUITY_SCORE: 67
ADLS_ACUITY_SCORE: 70
ADLS_ACUITY_SCORE: 69
ADLS_ACUITY_SCORE: 49
ADLS_ACUITY_SCORE: 67
ADLS_ACUITY_SCORE: 63
ADLS_ACUITY_SCORE: 63
ADLS_ACUITY_SCORE: 67
ADLS_ACUITY_SCORE: 69
ADLS_ACUITY_SCORE: 63
ADLS_ACUITY_SCORE: 67
ADLS_ACUITY_SCORE: 67
ADLS_ACUITY_SCORE: 69
ADLS_ACUITY_SCORE: 41
ADLS_ACUITY_SCORE: 65
ADLS_ACUITY_SCORE: 69
ADLS_ACUITY_SCORE: 65
ADLS_ACUITY_SCORE: 41
ADLS_ACUITY_SCORE: 51
ADLS_ACUITY_SCORE: 67
ADLS_ACUITY_SCORE: 51
ADLS_ACUITY_SCORE: 69
ADLS_ACUITY_SCORE: 63
ADLS_ACUITY_SCORE: 69
ADLS_ACUITY_SCORE: 51
ADLS_ACUITY_SCORE: 69
ADLS_ACUITY_SCORE: 63
ADLS_ACUITY_SCORE: 69
ADLS_ACUITY_SCORE: 41
ADLS_ACUITY_SCORE: 53
ADLS_ACUITY_SCORE: 65
ADLS_ACUITY_SCORE: 69
ADLS_ACUITY_SCORE: 71
ADLS_ACUITY_SCORE: 69
ADLS_ACUITY_SCORE: 65
ADLS_ACUITY_SCORE: 67
ADLS_ACUITY_SCORE: 69
ADLS_ACUITY_SCORE: 65
ADLS_ACUITY_SCORE: 65
ADLS_ACUITY_SCORE: 53
ADLS_ACUITY_SCORE: 63
ADLS_ACUITY_SCORE: 41
ADLS_ACUITY_SCORE: 63
ADLS_ACUITY_SCORE: 65
ADLS_ACUITY_SCORE: 67
ADLS_ACUITY_SCORE: 69
ADLS_ACUITY_SCORE: 67
ADLS_ACUITY_SCORE: 41
ADLS_ACUITY_SCORE: 63
ADLS_ACUITY_SCORE: 69
ADLS_ACUITY_SCORE: 51
ADLS_ACUITY_SCORE: 70
ADLS_ACUITY_SCORE: 69
ADLS_ACUITY_SCORE: 65
ADLS_ACUITY_SCORE: 41
ADLS_ACUITY_SCORE: 63
ADLS_ACUITY_SCORE: 67
ADLS_ACUITY_SCORE: 53
ADLS_ACUITY_SCORE: 63
ADLS_ACUITY_SCORE: 65
ADLS_ACUITY_SCORE: 67
ADLS_ACUITY_SCORE: 67
ADLS_ACUITY_SCORE: 71
ADLS_ACUITY_SCORE: 69
ADLS_ACUITY_SCORE: 63
ADLS_ACUITY_SCORE: 67
ADLS_ACUITY_SCORE: 67
ADLS_ACUITY_SCORE: 63
ADLS_ACUITY_SCORE: 41
ADLS_ACUITY_SCORE: 69
ADLS_ACUITY_SCORE: 65
ADLS_ACUITY_SCORE: 69
ADLS_ACUITY_SCORE: 71
ADLS_ACUITY_SCORE: 65
ADLS_ACUITY_SCORE: 41
ADLS_ACUITY_SCORE: 67
ADLS_ACUITY_SCORE: 69
ADLS_ACUITY_SCORE: 63
ADLS_ACUITY_SCORE: 69
ADLS_ACUITY_SCORE: 69
ADLS_ACUITY_SCORE: 71
ADLS_ACUITY_SCORE: 41
ADLS_ACUITY_SCORE: 63
ADLS_ACUITY_SCORE: 51
ADLS_ACUITY_SCORE: 65
ADLS_ACUITY_SCORE: 67
ADLS_ACUITY_SCORE: 69
ADLS_ACUITY_SCORE: 65
ADLS_ACUITY_SCORE: 55
ADLS_ACUITY_SCORE: 67
ADLS_ACUITY_SCORE: 67
ADLS_ACUITY_SCORE: 41
ADLS_ACUITY_SCORE: 67
ADLS_ACUITY_SCORE: 41
ADLS_ACUITY_SCORE: 53
ADLS_ACUITY_SCORE: 65
ADLS_ACUITY_SCORE: 63
ADLS_ACUITY_SCORE: 69
ADLS_ACUITY_SCORE: 55
ADLS_ACUITY_SCORE: 71
ADLS_ACUITY_SCORE: 67
ADLS_ACUITY_SCORE: 41
ADLS_ACUITY_SCORE: 69
ADLS_ACUITY_SCORE: 67
ADLS_ACUITY_SCORE: 67
ADLS_ACUITY_SCORE: 65
ADLS_ACUITY_SCORE: 67
ADLS_ACUITY_SCORE: 65
ADLS_ACUITY_SCORE: 63
ADLS_ACUITY_SCORE: 67
ADLS_ACUITY_SCORE: 69
ADLS_ACUITY_SCORE: 69
ADLS_ACUITY_SCORE: 67
ADLS_ACUITY_SCORE: 53
ADLS_ACUITY_SCORE: 41
ADLS_ACUITY_SCORE: 53
ADLS_ACUITY_SCORE: 69
ADLS_ACUITY_SCORE: 69
ADLS_ACUITY_SCORE: 67
ADLS_ACUITY_SCORE: 65
ADLS_ACUITY_SCORE: 65
ADLS_ACUITY_SCORE: 63
ADLS_ACUITY_SCORE: 67
ADLS_ACUITY_SCORE: 63
ADLS_ACUITY_SCORE: 69
ADLS_ACUITY_SCORE: 67
ADLS_ACUITY_SCORE: 67
ADLS_ACUITY_SCORE: 69
ADLS_ACUITY_SCORE: 67
ADLS_ACUITY_SCORE: 67
ADLS_ACUITY_SCORE: 65
ADLS_ACUITY_SCORE: 67
ADLS_ACUITY_SCORE: 65
ADLS_ACUITY_SCORE: 67
ADLS_ACUITY_SCORE: 51
ADLS_ACUITY_SCORE: 67
ADLS_ACUITY_SCORE: 69
ADLS_ACUITY_SCORE: 67
ADLS_ACUITY_SCORE: 67
ADLS_ACUITY_SCORE: 53
ADLS_ACUITY_SCORE: 67
ADLS_ACUITY_SCORE: 69
ADLS_ACUITY_SCORE: 41
ADLS_ACUITY_SCORE: 41
ADLS_ACUITY_SCORE: 65
ADLS_ACUITY_SCORE: 67
ADLS_ACUITY_SCORE: 69
ADLS_ACUITY_SCORE: 63
ADLS_ACUITY_SCORE: 65
ADLS_ACUITY_SCORE: 65
ADLS_ACUITY_SCORE: 71
ADLS_ACUITY_SCORE: 67
ADLS_ACUITY_SCORE: 65
ADLS_ACUITY_SCORE: 69
ADLS_ACUITY_SCORE: 63
ADLS_ACUITY_SCORE: 41
ADLS_ACUITY_SCORE: 53
ADLS_ACUITY_SCORE: 65
ADLS_ACUITY_SCORE: 67
ADLS_ACUITY_SCORE: 69
ADLS_ACUITY_SCORE: 65
ADLS_ACUITY_SCORE: 71
ADLS_ACUITY_SCORE: 67
ADLS_ACUITY_SCORE: 67
ADLS_ACUITY_SCORE: 65
ADLS_ACUITY_SCORE: 69
ADLS_ACUITY_SCORE: 67
ADLS_ACUITY_SCORE: 63
ADLS_ACUITY_SCORE: 65
ADLS_ACUITY_SCORE: 67
ADLS_ACUITY_SCORE: 63
ADLS_ACUITY_SCORE: 67
ADLS_ACUITY_SCORE: 67
ADLS_ACUITY_SCORE: 65
ADLS_ACUITY_SCORE: 53
ADLS_ACUITY_SCORE: 71
ADLS_ACUITY_SCORE: 70
ADLS_ACUITY_SCORE: 65
ADLS_ACUITY_SCORE: 69
ADLS_ACUITY_SCORE: 67
ADLS_ACUITY_SCORE: 63
ADLS_ACUITY_SCORE: 53
ADLS_ACUITY_SCORE: 67
ADLS_ACUITY_SCORE: 69
ADLS_ACUITY_SCORE: 67
ADLS_ACUITY_SCORE: 67
ADLS_ACUITY_SCORE: 69
ADLS_ACUITY_SCORE: 69
ADLS_ACUITY_SCORE: 53
ADLS_ACUITY_SCORE: 51
ADLS_ACUITY_SCORE: 65
ADLS_ACUITY_SCORE: 41
ADLS_ACUITY_SCORE: 67
ADLS_ACUITY_SCORE: 69
ADLS_ACUITY_SCORE: 41
ADLS_ACUITY_SCORE: 67
ADLS_ACUITY_SCORE: 71
ADLS_ACUITY_SCORE: 67
ADLS_ACUITY_SCORE: 65
ADLS_ACUITY_SCORE: 65
ADLS_ACUITY_SCORE: 69
ADLS_ACUITY_SCORE: 41
ADLS_ACUITY_SCORE: 67
ADLS_ACUITY_SCORE: 69
ADLS_ACUITY_SCORE: 65
ADLS_ACUITY_SCORE: 69
ADLS_ACUITY_SCORE: 67
ADLS_ACUITY_SCORE: 67
ADLS_ACUITY_SCORE: 69
ADLS_ACUITY_SCORE: 67
ADLS_ACUITY_SCORE: 67
ADLS_ACUITY_SCORE: 69
ADLS_ACUITY_SCORE: 65
ADLS_ACUITY_SCORE: 67
ADLS_ACUITY_SCORE: 63
ADLS_ACUITY_SCORE: 69
ADLS_ACUITY_SCORE: 69
ADLS_ACUITY_SCORE: 67
ADLS_ACUITY_SCORE: 67
ADLS_ACUITY_SCORE: 65
ADLS_ACUITY_SCORE: 69
ADLS_ACUITY_SCORE: 67
ADLS_ACUITY_SCORE: 63
ADLS_ACUITY_SCORE: 69
ADLS_ACUITY_SCORE: 65
ADLS_ACUITY_SCORE: 67
ADLS_ACUITY_SCORE: 41
ADLS_ACUITY_SCORE: 63
ADLS_ACUITY_SCORE: 65
ADLS_ACUITY_SCORE: 69
ADLS_ACUITY_SCORE: 67
ADLS_ACUITY_SCORE: 65
ADLS_ACUITY_SCORE: 67
ADLS_ACUITY_SCORE: 49
ADLS_ACUITY_SCORE: 69
ADLS_ACUITY_SCORE: 67
ADLS_ACUITY_SCORE: 67
ADLS_ACUITY_SCORE: 69
ADLS_ACUITY_SCORE: 41
ADLS_ACUITY_SCORE: 67
ADLS_ACUITY_SCORE: 63
ADLS_ACUITY_SCORE: 69
ADLS_ACUITY_SCORE: 67
ADLS_ACUITY_SCORE: 69
ADLS_ACUITY_SCORE: 41
ADLS_ACUITY_SCORE: 69
ADLS_ACUITY_SCORE: 67
ADLS_ACUITY_SCORE: 41
ADLS_ACUITY_SCORE: 67
ADLS_ACUITY_SCORE: 67
ADLS_ACUITY_SCORE: 63
ADLS_ACUITY_SCORE: 63
ADLS_ACUITY_SCORE: 69
ADLS_ACUITY_SCORE: 41
ADLS_ACUITY_SCORE: 65
ADLS_ACUITY_SCORE: 65
ADLS_ACUITY_SCORE: 69
ADLS_ACUITY_SCORE: 63
ADLS_ACUITY_SCORE: 63
ADLS_ACUITY_SCORE: 69
ADLS_ACUITY_SCORE: 63
ADLS_ACUITY_SCORE: 65
ADLS_ACUITY_SCORE: 69
ADLS_ACUITY_SCORE: 67
ADLS_ACUITY_SCORE: 65
ADLS_ACUITY_SCORE: 69
ADLS_ACUITY_SCORE: 69
ADLS_ACUITY_SCORE: 67
ADLS_ACUITY_SCORE: 69
ADLS_ACUITY_SCORE: 67
ADLS_ACUITY_SCORE: 69
ADLS_ACUITY_SCORE: 49
ADLS_ACUITY_SCORE: 63
ADLS_ACUITY_SCORE: 41
ADLS_ACUITY_SCORE: 69
ADLS_ACUITY_SCORE: 67
ADLS_ACUITY_SCORE: 67
ADLS_ACUITY_SCORE: 65
ADLS_ACUITY_SCORE: 41
ADLS_ACUITY_SCORE: 69
ADLS_ACUITY_SCORE: 41
ADLS_ACUITY_SCORE: 69
ADLS_ACUITY_SCORE: 67
ADLS_ACUITY_SCORE: 63
ADLS_ACUITY_SCORE: 69
ADLS_ACUITY_SCORE: 67
ADLS_ACUITY_SCORE: 41
ADLS_ACUITY_SCORE: 67
ADLS_ACUITY_SCORE: 69
ADLS_ACUITY_SCORE: 67
ADLS_ACUITY_SCORE: 49
ADLS_ACUITY_SCORE: 63
ADLS_ACUITY_SCORE: 67
ADLS_ACUITY_SCORE: 67
ADLS_ACUITY_SCORE: 65
ADLS_ACUITY_SCORE: 71
ADLS_ACUITY_SCORE: 71
ADLS_ACUITY_SCORE: 67
ADLS_ACUITY_SCORE: 65

## 2024-01-01 ASSESSMENT — PAIN SCALES - GENERAL: PAINLEVEL: WORST PAIN (10)

## 2024-01-01 ASSESSMENT — COLUMBIA-SUICIDE SEVERITY RATING SCALE - C-SSRS
6. HAVE YOU EVER DONE ANYTHING, STARTED TO DO ANYTHING, OR PREPARED TO DO ANYTHING TO END YOUR LIFE?: NO
2. HAVE YOU ACTUALLY HAD ANY THOUGHTS OF KILLING YOURSELF IN THE PAST MONTH?: NO
6. HAVE YOU EVER DONE ANYTHING, STARTED TO DO ANYTHING, OR PREPARED TO DO ANYTHING TO END YOUR LIFE?: NO
2. HAVE YOU ACTUALLY HAD ANY THOUGHTS OF KILLING YOURSELF IN THE PAST MONTH?: NO
1. IN THE PAST MONTH, HAVE YOU WISHED YOU WERE DEAD OR WISHED YOU COULD GO TO SLEEP AND NOT WAKE UP?: NO
1. IN THE PAST MONTH, HAVE YOU WISHED YOU WERE DEAD OR WISHED YOU COULD GO TO SLEEP AND NOT WAKE UP?: NO

## 2024-01-01 ASSESSMENT — PATIENT HEALTH QUESTIONNAIRE - PHQ9
SUM OF ALL RESPONSES TO PHQ QUESTIONS 1-9: 14
10. IF YOU CHECKED OFF ANY PROBLEMS, HOW DIFFICULT HAVE THESE PROBLEMS MADE IT FOR YOU TO DO YOUR WORK, TAKE CARE OF THINGS AT HOME, OR GET ALONG WITH OTHER PEOPLE: EXTREMELY DIFFICULT
SUM OF ALL RESPONSES TO PHQ QUESTIONS 1-9: 16
10. IF YOU CHECKED OFF ANY PROBLEMS, HOW DIFFICULT HAVE THESE PROBLEMS MADE IT FOR YOU TO DO YOUR WORK, TAKE CARE OF THINGS AT HOME, OR GET ALONG WITH OTHER PEOPLE: EXTREMELY DIFFICULT
10. IF YOU CHECKED OFF ANY PROBLEMS, HOW DIFFICULT HAVE THESE PROBLEMS MADE IT FOR YOU TO DO YOUR WORK, TAKE CARE OF THINGS AT HOME, OR GET ALONG WITH OTHER PEOPLE: VERY DIFFICULT
SUM OF ALL RESPONSES TO PHQ QUESTIONS 1-9: 14
SUM OF ALL RESPONSES TO PHQ QUESTIONS 1-9: 9
SUM OF ALL RESPONSES TO PHQ QUESTIONS 1-9: 9
SUM OF ALL RESPONSES TO PHQ QUESTIONS 1-9: 16

## 2024-01-01 NOTE — TELEPHONE ENCOUNTER
Chantal Connell MD,  Pharmacy sent request for 90-day supply    New Rx pended    Please review/sign or advise    Thank You!  Neelima Dixon, JODY  Triage Nurse   Medication Management [address]  [phone] (phone)  [phone] (fax)    Ms. Rosaura Maravilla is a 81 y.o.  female with history of Afib who presents today for anticoagulation monitoring and adjustment. Patient verifies current dosing regimen and tablet strength. No missed or extra doses. Patient denies s/s bleeding/bruising/swelling/SOB/chest pain  No blood in urine or stool. No dietary changes. No changes in medication/OTC agents/Herbals. No change in alcohol use or tobacco use. No change in activity level. Patient denies headaches/dizziness/lightheadedness/falls. No vomiting/diarrhea or acute illness. No Procedures scheduled in the future at this time. Assessment:   Lab Results   Component Value Date    INR 2.60 (H) 09/29/2021    INR 1.70 (H) 09/01/2021    INR 2.00 (H) 07/28/2021     INR therapeutic   Recent Labs     09/29/21  1119   INR 2.60*         Plan:  Continue Coumadin 5 mg daily. Recheck INR in 5 week(s). Patient reminded to call the Anticoagulation Clinic with any signs or symptoms of bleeding or with any medication changes. Patient given instructions utilizing the teach back method. After visit summary printed and reviewed with patient. Discharged ambulatory in no apparent distress.         For Pharmacy Admin Tracking Only     Time Spent (min): [address] Peña PharmD, BCPS  9/29/2021  11:48 AM 2024

## 2024-01-03 NOTE — PROGRESS NOTES
[facility] Heart - C.O.R.E. Clinic:  CardioMems Routine Remote Evaluation     Dates: 11/25/23 through 12/24/23    Adjustments made in the last month: none. PAD overall in goal range.     No adjustments made this month.  Discussed with patient/caregiver and they will continue current plan of care.     Threshold Alert Settings:    Readings:       Future Appointments   Date Time Provider Department Center   1/8/2024  3:30 PM [facility]   6/17/2024  2:30 PM Opal Weber MD [facility]

## 2024-01-04 NOTE — PROGRESS NOTES
[facility] CORE Clinic - CardioMEMS Reading Review    January 4, 2024   CardioMEMS reviewed.  Current diuretic: Bumex 3 mg daily  Recent plan of care changes: none. Not sending frequent readings, but PAD is in goal range with readings sent.     Current Threshold parameters:         Today's Waveform:       Readings:               Rosina Mays RN

## 2024-01-05 NOTE — PROGRESS NOTES
Remote monitoring of Pulmonary Artery Pressures via CardioMEMS device reviewed at least weekly and as needed with CORE nursing. Diuretics adjusted accordingly.     Billing dates 11/25/23-12/24/23    Austin CORNEJO NP-C

## 2024-01-08 NOTE — COMMUNITY RESOURCES LIST (ENGLISH)
01/08/2024   [facility]  N/A  For questions about this resource list or additional care needs, please contact your primary care clinic or care manager.  Phone: [phone]   Email: N/A   Address: [address] [place] [address]   Hours: N/A        Transportation       Free or low-cost transportation  1  [employer] Distance: 3.78 miles      In-Person   [address] [place] [address]  Language: English, Northern Irish  Hours: Mon 9:00 AM - 5:00 PM , Tue 9:30 AM - 7:00 PM , Wed 9:00 AM - 5:00 PM , Thu 9:30 AM - 7:00 PM , Fri 9:00 AM - 5:00 PM  Fees: Free   Phone: (461) 411-1409 Email: contactus@[employer] Website: http://www.StudioTweets     2  [employer] [facility] Circulator Bus Distance: 4.84 miles      In-Person   [address] [place] [address]  Language: English  Hours: Tue 9:00 AM - 2:00 PM  Fees: Self Pay   Phone: (501) 185-6390 Email: info@AirPOS Website: http://www.Appydrink.org/     Transportation to medical appointments  3  [facility] Distance: 2.33 miles      In-Person   [address] [place] [address]  Language: English  Hours: Mon - Fri 9:00 AM - 4:00 PM  Fees: Free   Phone: (286) 551-6647 Email: neighborhoodnetworkforseniors@Cook123.com Website: http://www.[facility] Hospitalnetworkforseniors.org/     4  [facility] Transportation Programs - Non-Emergency Medical Transportation Distance: 2.4 miles      In-Person   [address] [place] [address]  Language: English, Citizen of [place], Northern Irish  Hours: Mon - Fri 7:00 AM - 6:00 PM  Fees: Insurance   Phone: (995) 864-3286 Ext.4434 Email: tomás@MollyWatr.net Website: http://www.[facility].net/[place]/          Important Numbers & Websites       Emergency Services   911  City Services   311  Poison Control   (431) 370-9616  Suicide Prevention Lifeline   (267) 166-9380 (TALK)  Child Abuse Hotline   (364) 333-4463 (4-A-Child)  Sexual Assault Hotline    (259) 292-6174 (HOPE)  National Runaway Safeline   (556) 160-2977 (RUNAWAY)  All-Options Talkline   (689) 753-1918  Substance Abuse Referral   (421) 559-9439 (HELP)

## 2024-01-08 NOTE — PROGRESS NOTES
Mariel is a 78 year old who is being evaluated via a billable video visit.      How would you like to obtain your AVS? MyChart  If the video visit is dropped, the invitation should be resent by: Text to cell phone: [phone]  Will anyone else be joining your video visit? Yes: Roommate, Odalis. How would they like to receive their invitation? Text to cell phone: [phone]          Assessment & Plan     Idiopathic chronic gout of multiple sites without tophus  Symptoms well controlled, tolerating current medication and uric acid is within goal.  Refills provided.    - febuxostat (ULORIC) 80 MG TABS tablet; Take 1 tablet (80 mg) by mouth daily    Urinary incontinence, unspecified type  Roommate doesn't note much improvement with Detrol.  Will discontinue due to possible anti-cholinergic effect and polypharmacy.  Patient has frequent falls and would benefit from medication reduction.      Pain in joint of left shoulder   Stable, tolerating med, no changes at this time  - pregabalin (LYRICA) 100 MG capsule; Take 1 capsule (100 mg) by mouth 2 times daily    Hypertensive chronic kidney disease with stage 5 chronic kidney disease or end stage renal disease (H)  Stable, follows with nephrology    Chronic diastolic heart failure (H)  Stable, cardiology mananging bumex dose.  Improvement in her swelling since discharge.      Chronic obstructive pulmonary disease, unspecified COPD type (H)  Stable, no new respiratory distress.  Continue current regimen, follow up with pulmonology as needed.     Chronic depressive disorder  Stable, continue venlafaxine.      Bilateral carotid artery disease, unspecified type (H24)  Non-operable, continue atorvastatin.    Morbid obesity (H)      Malignant neoplasm of lower-inner quadrant of left breast in female, estrogen receptor positive (H)  Follows with oncology, on anastrozole    Type 2 diabetes mellitus with other specified complication, with long-term current use of insulin  (H)  Continue 32 units of lantus.  Will have home health assist with next A1C if needed.  Given her history of falling and poor-longterm prognosis, would liberalize goal for A1C to <8%      42 minutes spent by me on the date of the encounter doing chart review, history and exam, documentation and further activities per the note     MED REC REQUIRED  Post Medication Reconciliation Status: discharge medications reconciled and changed, per note/orders      CHANTAL [facility]    Breana Floyd is a 78 year old, presenting for the following health issues:  Hospital F/U      1/8/2024     2:58 PM   Additional Questions   Roomed by Hanna BAILON   Accompanied by tatyana Jacobo       Hospital Follow-up Visit:    Hospital/Nursing Home/ Rehab Facility:  [facility],  Date of Admission: 9/11/23  Date of Discharge: 10/10/2023  Reason(s) for Admission: Failure to thrive, multiple medication conditions    Was your hospitalization related to COVID-19? No   Problems taking medications regularly:  None  Medication changes since discharge: yes  Problems adhering to non-medication therapy:  None    Summary of hospitalization:  Discharge summary unavailable  Diagnostic Tests/Treatments reviewed.  Follow up needed: Home care ongoing 2 months  Other Healthcare Providers Involved in Patient s Care:         Physical Therapy  Update since discharge: stable.         Plan of care communicated with patient and caregiver           Was discharged from TCU 2 months ago.  Getting home care, which is winding down.     Gets confused at night, still unsteady.  Has ended up on the floor.  Now she has some bed rails at night.        Was taken off Jardiance because of fluid retention and incontinence.      Changed to Febuxostat for gout and colchicine for acute episodes.    Symptoms seem ok.  Ended up with a Lump on wrist.  Uric acid levels improved.      Taken of Farixiga because of yeast.       Does well when helpers come to the home.  Has someone coming twice a week to do a morning routine.      Gets stubborn at night- maybe sundowning?    Needing to get Flu/COVID shots- needs to find someone who will come to the house.    Thinking they will need to do a ramp for mobility to get Mariel out of the house.  Home health can assist with Labs.      Review of Systems   Constitutional, HEENT, cardiovascular, pulmonary, gi and gu systems are negative, except as otherwise noted.      Objective    Vitals - Patient Reported  Pain Score: Moderate Pain (4)  Pain Loc: Foot        Physical Exam   GENERAL: alert, no distress, and elderly  RESP: No audible wheeze, cough, or visible cyanosis.  No visible retractions or increased work of breathing.    SKIN: Visible skin clear. No significant rash, abnormal pigmentation or lesions.  NEURO: Cranial nerves grossly intact.  Mentation and speech appropriate for age.  PSYCH: concentration poor and affect flat                Video-Visit Details    Type of service:  Video Visit   Video Start Time:  1529  Video End Time: 1553    Originating Location (pt. Location): Home    Distant Location (provider location):  Off-site  Platform used for Video Visit: MovableInk      Answers submitted by the patient for this visit:  Patient Health Questionnaire (Submitted on 1/8/2024)  If you checked off any problems, how difficult have these problems made it for you to do your work, take care of things at home, or get along with other people?: Very difficult  PHQ9 TOTAL SCORE: 9

## 2024-01-12 NOTE — PROGRESS NOTES
[facility] CORE Clinic - CardioMEMS Reading Review    January 12, 2024   CardioMEMS reviewed.  Current diuretic: Bumex 3 mg daily  Recent plan of care changes: none. PAD in goal range with reading sent this week.     Current Threshold parameters:         Today's Waveform:       Readings:               Rosina Mays RN

## 2024-01-25 NOTE — PROGRESS NOTES
[facility] CORE Clinic - CardioMEMS Reading Review    January 25, 2024   CardioMEMS reviewed.  Current diuretic: Bumex 3 mg daily  Recent plan of care changes: none. PAD in goal range.     Current Threshold parameters:         Today's Waveform:       Readings:               Rosina Mays RN

## 2024-01-30 NOTE — TELEPHONE ENCOUNTER
Medicare does not cover Continuous Blood Gluc Sensor (FREESTYLE MARTY 14 DAY SENSOR) MISC through pharmacy benefits.  Medical supply may be able to bill this.  Please resend to medical supply.

## 2024-01-31 NOTE — TELEPHONE ENCOUNTER
Malika Wolf --  Krissy RN called to report a fall last Thursday for patient. 1/25/2024.   Situation: Patient was sitting on a rolling chair and she kind of fell out of chair unto her knees. Patient hit her chin on the desk on her way down causing a large purple bruise on right side of chin moving down into neck. Knees are fine.   Chin Bruise: 12.3 x 6.2 cm. No pain. No difficulty chewing or swallowing. Slight firmness to the touch.   911 was called to assist patient up off of the floor.   ----------------      Home Care is calling regarding an established patient with [facility].         Requesting orders from: Malika Wolf  Provider is following patient: Yes  Is this a 60-day recertification request?  Yes    Orders Requested    Skilled Nursing  Request for recertification   Frequency:  1x/wk for 4 wks    HHA (Home Health Aide)  Request for recertification   Frequency:  1x/wk for 4 wks      Information was gathered and will be sent to provider for review.  RN will contact Home Care with information after provider review.  Confirmed ok to leave a detailed message with call back.  Contact information confirmed and updated as needed.    Odalis Phoenix RN     General

## 2024-02-01 NOTE — PROGRESS NOTES
[facility] CORE Clinic - CardioMEMS Reading Review    February 1, 2024   CardioMEMS reviewed.  Current diuretic: Bumex 3 mg daily  Recent plan of care changes:     Current Threshold parameters:         Today's Waveform:       Readings:               Rosina Mays RN

## 2024-02-05 NOTE — TELEPHONE ENCOUNTER
Called this number 3 times.  This # doesn't go to [facility].    I did chart review and found the correct phone # for [facility].  I called [phone].  Gave the VO for these home care orders.  JODY Lopez

## 2024-02-05 NOTE — TELEPHONE ENCOUNTER
Forms/Letter Request    Type of form/letter: Home Health Certification      Do we have the form/letter: Yes: Form folder CARE TEAM 2    Who is the form from? Home care    Where did/will the form come from? form was faxed in    When is form/letter needed by: N/A    How would you like the form/letter returned: Fax : Number listed in contact

## 2024-02-06 NOTE — TELEPHONE ENCOUNTER
Reached out to patient and relayed message from PCP. Patient was confused. Marysol, self reported helper of patient, joined call saying it was on speaker. She said partient was a bit unclear so writer shared message again. Marysol mentioned a worker of patients, Odalis, would be arriving this afternoon and she would instruct Odalis to call us. Advised to not call us, call insurance.

## 2024-02-06 NOTE — TELEPHONE ENCOUNTER
I haven't ever heard of Medical equipment covering diabetes testing supply.  I would have patient reach out to insurance to see if different continuous glucose monitor being covered by insurance otherwise we may have to go back to finger monitoring.

## 2024-02-06 NOTE — PROGRESS NOTES
[facility] Heart - C.O.R.E. Clinic:  CardioMems Routine Remote Evaluation     Dates: 12/25/23 through 1/23/24    Adjustments made in the last month: none     No adjustments made this month.  Discussed with patient/caregiver and they will continue current plan of care.     Threshold Alert Settings: 3-6    Readings:       Future Appointments   Date Time Provider Department Center   6/17/2024  2:30 PM Opal Weber MD [facility]

## 2024-02-07 NOTE — PROGRESS NOTES
Remote monitoring of Pulmonary Artery Pressures via CardioMEMS device reviewed at least weekly and as needed with CORE nursing. Diuretics adjusted accordingly.     Billing dates 12/25/23-1/24/24    Austin HOLTC    Per patient, she is independent in all ADLs, active in walking, enjoys traveling and has a full flight to enter home and a full flight to the bedroom floor.

## 2024-02-15 NOTE — PROGRESS NOTES
[facility] CORE Clinic - CardioMEMS Reading Review    February 15, 2024   CardioMEMS reviewed.  Current diuretic: Bumex 3 mg daily  Recent plan of care changes:   None. PAD overall in goal range.   Current Threshold parameters:         Today's Waveform:       Readings:               Rosina Mays RN

## 2024-02-19 NOTE — TELEPHONE ENCOUNTER
Malika - requested refills pended below, please review and sign if in agreement    Writer replied to patient via [employer].  SONIA HermosilloN, RN  [facility]

## 2024-02-29 NOTE — PROGRESS NOTES
[facility] CORE Clinic - CardioMEMS Reading Review    February 29, 2024   CardioMEMS reviewed.  Current diuretic: Bumex 3 mg daily  Recent plan of care changes: none. PAD in goal range,.     Current Threshold parameters:         Today's Waveform:       Readings:                 Rosina Mays RN

## 2024-03-01 NOTE — TELEPHONE ENCOUNTER
"Subjective:       Patient ID: Dm Kelly is a 31 y.o. male.    Vitals:  height is 5' 7" (1.702 m) and weight is 95.3 kg (210 lb). His oral temperature is 97.9 °F (36.6 °C). His blood pressure is 127/79 and his pulse is 99. His respiration is 20 and oxygen saturation is 97%.     Chief Complaint: Leg Pain    HPI  ROS    Objective:      Physical Exam      Assessment:       No diagnosis found.      Plan:         There are no diagnoses linked to this encounter.                 " Please advise if home care orders may be given?        Home Care is calling regarding an established patient with [facility].        Requesting orders from: Malika Wolf  Provider is following patient: yes  Is this a 60-day recertification request?  no     Orders Requested       Physical Therapy    2 x weekly for 4 weeks        Confirmed ok to leave a detailed message with call back.  Contact information confirmed and updated as needed.       Nina Patel RN, BSN, PHN  [facility]

## 2024-03-04 NOTE — TELEPHONE ENCOUNTER
Home care called and advised of authorization for PT orders.    Nina Patel RN, BSN, PHN  [facility]

## 2024-03-06 NOTE — TELEPHONE ENCOUNTER
Left Voicemail (1st Attempt) for the patient to call back and schedule the following:    Appointment type: return HF  Provider: ANTHONY  Return date: JUNE OR JULY  Specialty phone number: [phone] OPT 1  Additional appointment(s) needed: N/A  Additonal Notes: N/A

## 2024-03-07 NOTE — PROGRESS NOTES
[facility] - CardioMEMS Reading Review    March 7, 2024   CardioMEMS reviewed.  Current diuretic: Bumex 3 mg daily  Recent plan of care changes: none. PAD in goal range.     Current Threshold parameters:         Today's Waveform:       Readings:               Rosina Mays RN

## 2024-03-08 NOTE — TELEPHONE ENCOUNTER
Left Voicemail (2nd Attempt) for the patient to call back and schedule the following:    Appointment type: return hf  Provider: kevin  Return date: June or july  Specialty phone number: [phone] opt 1  Additional appointment(s) needed: n/a   Additonal Notes: n/a

## 2024-03-11 NOTE — TELEPHONE ENCOUNTER
Sent patient a My Chart message to schedule a video return visit with Allie Samuel NP for a [facility] annual follow up. Patient to schedule labs on own // 03.11.2024 MCE

## 2024-03-12 NOTE — TELEPHONE ENCOUNTER
Noted that pt has not sent in MEM's reading since 3/3/24.     Called pt friend/roommate, Odalis, and asked her to please help pt send in a reading today; Odalis verbalized understanding.     Stacey Neal RN

## 2024-03-12 NOTE — TELEPHONE ENCOUNTER
Please advise if home care orders may be given?        Home Care is calling regarding an established patient with [facility].        Requesting orders from: Malika Wolf  Provider is following patient: yes  Is this a 60-day recertification request?  no     Orders Requested     Patient had fall 3 weeks ago and has been complaining of increasing left upper arm/shoulder pain. Requests portable x-ray of left upper arm/shoulder.  Patient has had increased fatigue and requesting UA/UC  Cardiology ordered labs and home care will draw them if we can see what is needed in EPIC: Hemoglobin A1c, TSH with free T4 reflex, lipid panel reflex to direct LDL fasting, albumin random urine and uric acid.       Confirmed ok to leave a detailed message with call back.  Contact information confirmed and updated as needed.     Nina Patel, RN, BSN, PHN  [facility]

## 2024-03-14 NOTE — TELEPHONE ENCOUNTER
Writer called and left message on patient's voicemail to call back and speak with a triage nurse. [facility].     Writer called and left message on Krissy's (nurse from [facility]) voicemail to call back and speak with a triage nurse.    Message from Malika Wolf:   She needs to be evaluated in person for these issues.  Has not had a visit in some time with me and I haven't had an in person evaluation in a long time.     Odalis Phoenix, SONIAN RN  [facility]

## 2024-03-14 NOTE — PROGRESS NOTES
[facility] - CardioMEMS Reading Review    March 14, 2024   CardioMEMS reviewed.  Current diuretic: Bumex 3 mg daily  Recent plan of care changes: None. Had to call pt friend, Odalis, and remind her to help pt send in reading, sent on 3/12. Called Odalis again today and left message to please send in another reading.     Current Threshold parameters:         Today's Waveform:       Readings:               Stacey Neal RN

## 2024-03-14 NOTE — TELEPHONE ENCOUNTER
She needs to be evaluated in person for these issues.  Has not had a visit in some time with me and I haven't had an in person evaluation in a long time.

## 2024-03-18 NOTE — TELEPHONE ENCOUNTER
Called home care RN and advised. She had let patient's caregiver know Malika's recommendation and she will follow up with her this week to ensure an appointment is scheduled.    Nina Patel RN, BSN, PHN  [facility]

## 2024-03-21 NOTE — PROGRESS NOTES
[facility] - CardioMEMS Reading Review    March 21, 2024   CardioMEMS reviewed.  Current diuretic: Bumex 3 mg daily  Recent plan of care changes: none. Last reading sent was just out of range, message sent asking Mariel to send more readings.     Current Threshold parameters:         Today's Waveform:       Readings:               Rosina Mays RN

## 2024-03-22 NOTE — PROGRESS NOTES
Mariel is a 78 year old who is being evaluated via a billable video visit.    How would you like to obtain your AVS? MyChart  If the video visit is dropped, the invitation should be resent by: Send to e-mail at: [email or web address] To Get [email or web address] For  Will anyone else be joining your video visit? yes      Assessment & Plan     Type 2 diabetes mellitus with stage 3b chronic kidney disease, with long-term current use of insulin (H)  Reviewed her last A1c which is stable.  Needing to upgrade her smith as they are having difficulty finding sensors for her old .  New order sent.  Due for microalbumin and they are planning to have their home health nurse assist with lab collection.  - Albumin Random Urine Quantitative with Creat Ratio; Future  - Continuous Blood Gluc  (FREESTYLE SMITH 3 READER) JEZ; 1 each once for 1 dose Use to read blood sugars per 's instructions.  - Continuous Blood Gluc Sensor (FREESTYLE SMITH 3 SENSOR) MISC; 1 each every 14 days Use 1 sensor every 14 days. Use to read blood sugars per 's instructions.    Mixed incontinence  Noticed significant increase in her incontinence after stopping Detrol.  We discussed this could be multifactorial and also secondary to her diuretics.  They would like to restart this medication.  Refill sent  - tolterodine ER (DETROL LA) 2 MG 24 hr capsule; Take 1 capsule (2 mg) by mouth daily  - UA Macroscopic with reflex to Microscopic and Culture - Lab Collect; Future    Acute pain of left shoulder  Exam is reassuring, no decreased range of motion.  Low suspicion of fracture, symptoms sound consistent with tendinitis or muscle strain.  Okay to use her Voltaren on her shoulder and recommended scheduled Tylenol.  Also recommended heat and ice.  If symptoms worsen or persist we can consider steroid injection.    Decreased mobility  Mariel has difficulty leaving the house.  Her friend and caretaker Odalis is attempting to get a ramp put in for easier  "mobility.  Suggested if they have issues we can reach out to care coordination to see if there were any resources available.      40 minutes spent by me on the date of the encounter doing chart review, history and exam, documentation and further activities per the note    The longitudinal plan of care for the diagnosis(es)/condition(s) as documented were addressed during this visit. Due to the added complexity in care, I will continue to support Mariel in the subsequent management and with ongoing continuity of care.        BMI  Estimated body mass index is 46.45 kg/m  as calculated from the following:    Height as of 12/1/23: 1.683 m (5' 6.25\").    Weight as of 12/1/23: 131.5 kg (290 lb).   Weight management plan: Discussed healthy diet and exercise guidelines          Subjective   Mariel is a 78 year old, presenting for the following health issues:  Recheck Medication      3/22/2024     4:16 PM   Additional Questions   Roomed by Aleta CASTANON   Accompanied by Odalis     Video Start Time:  1626    History of Present Illness       Diabetes:   She presents for follow up of diabetes.  She is checking home blood glucose a few times a week.   She checks blood glucose before and after meals.  Blood glucose is sometimes over 200 and never under 70. She is aware of hypoglycemia symptoms including none.   She is concerned about other.   She is having numbness in feet.  The patient has not had a diabetic eye exam in the last 12 months.          Reason for visit:  Medication questions: tolterodine, Kaylah 14 Day Sensor; pain in upper left arm; question about blood drawShe consumes 0 sweetened beverage(s) daily.She exercises with enough effort to increase her heart rate 9 or less minutes per day.  She exercises with enough effort to increase her heart rate 3 or less days per week.   She is taking medications regularly.     When they stopped the Detrol, symptoms got much worse.  Initially weren't sure it was working.    Kaylah- has the " old reader- having issues getting the sensor.      In terms of blood draws- they do not have a ramp in the home, hard to get Mariel out of the house.  [facility] has private pay RN that can do it for pay- out out of pocket.     Pain in left upper arm for a long time.  The nurse from [facility] thinks it is localized, PT thinks it could be tendonitis.      Another agency has someone coming a couple days and can do therapeutic exercises.      Lymphedema is much improved.  Hasn't needed the wraps as much, but have figured out a way to keep legs elevated through the night.                Objective           Vitals:  No vitals were obtained today due to virtual visit.    Physical Exam   GENERAL: alert and no distress  EYES: Eyes grossly normal to inspection.  No discharge or erythema, or obvious scleral/conjunctival abnormalities.  RESP: No audible wheeze, cough, or visible cyanosis.    SKIN: Visible skin clear. No significant rash, abnormal pigmentation or lesions.  NEURO: Cranial nerves grossly intact.  Mentation and speech appropriate for age.  PSYCH: Appropriate affect, tone, and pace of words          Video-Visit Details    Type of service:  Video Visit   Video End Time: 1653  Originating Location (pt. Location): Home    Distant Location (provider location):  Off-site  Platform used for Video Visit: Guy  Signed Electronically by: CHANTAL Grace CNP

## 2024-03-25 NOTE — TELEPHONE ENCOUNTER
General Call     Who is Calling: Dora (RN [facility])       Reason for call: Caller states pt was seen by PCP on 3/22/24 and urine lab order was placed. Pt also has pending cardiovascular lab order.     What is/are your concern (s): Caller inquired what kind of labs needed.     Date of last appointment with provider:  3/22/24       RN review EPIC and relayed that PCP order UA with and random albumin. EPIC showed a pended BMP ordered by Austin Miguel APRN CNP cardiovascular.        Joy RETANA RN  [facility]

## 2024-03-28 NOTE — PROGRESS NOTES
[facility] - CardioMEMS Reading Review    March 28, 2024   CardioMEMS reviewed.  Current diuretic: Bumex 3 mg daily  Recent plan of care changes: none    Current Threshold parameters:         Today's Waveform:       Readings:               Rosina Mays RN

## 2024-03-29 NOTE — PROGRESS NOTES
[facility]:  CardioMems Routine Remote Evaluation     Dates: 2/23/24 through 3/23/24    Adjustments made in the last month: none. PAD overall in goal range.     No adjustments made this month.  Discussed with patient/caregiver and they will continue current plan of care.     Threshold Alert Settings: 3-6    Readings:         Future Appointments   Date Time Provider Department Center   4/5/2024  9:40 AM Gwendolyn Mobley PA-C [facility]   6/17/2024  2:30 PM Opal Weber MD [facility]

## 2024-04-02 NOTE — PROGRESS NOTES
[facility] - CardioMEMS Reading Review    April 2, 2024   CardioMEMS reviewed.  Current diuretic: Bumex 3 mg daily  Recent plan of care changes: None. PAD elevated yesterday at 10. Mychart sent checking in and asking them to send updated reading today.   Rosina Mays RN     Current Threshold parameters:         Today's Waveform:       Readings:               Rosina Mays RN

## 2024-04-03 NOTE — PROGRESS NOTES
Reviewed with Austin Adams NP.   Called with following recommendations:  Date: 4/3/2024    Time of Call: 3:20 PM     Diagnosis:  HF     [ VORB ] Ordering provider: Austin Adams NP  Order: Increase Bumex to 4 mg daily for 4 days.      Order received by: Rosina Mays RN      Follow-up/additional notes: left detailed message for Odalis and sent mychart.

## 2024-04-03 NOTE — TELEPHONE ENCOUNTER
Called patient. Left voicemail requesting Mariel send an updated mems reading.     Rosina Mays RN

## 2024-04-03 NOTE — PROGRESS NOTES
Updated mems reading today is still elevated at 12. Goal 3-6.   Odalis doesn't notice any increased swelling in legs, but she does notice that Mariel is more short of breath than normal. She reports overall Mariel hasn't been functioning as well as normal but not heart related. Her arm hurts. She's been more stubborn and less energetic.     Of note for the last few years Mariel has done her readings sitting in a chair. The last few weeks they realized they could do it while she is on the commode, and this is easier for them and they can send readings more frequently this way. Had started doing this a few weeks ago, so all the readings week of 3/24 were at 5 and they were done on the commode.     Odalis is going to try to send another reading later this afternoon. Will review with provider.

## 2024-04-04 NOTE — PROGRESS NOTES
Remote monitoring of Pulmonary Artery Pressures via CardioMEMS device reviewed at least weekly and as needed with CORE nursing. Diuretics adjusted accordingly.     billing dates of 2/23/24 through 3/2324     Austin [facility]

## 2024-04-05 NOTE — PATIENT INSTRUCTIONS
No changes today.   Okay to have labs checked every 4 months from a nephrology standpoint unless long term changes are made to bumex, then check as appropriate.   Check blood pressure at home and keep log.   Avoid ibuprofen/aleve.   Hydrate with water, follow low sodium diet.   Follow up in 4 months.
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
## 2024-04-05 NOTE — NURSING NOTE
Is the patient currently in the state of [place]? YES    Visit mode:VIDEO    If the visit is dropped, the patient can be reconnected by: VIDEO VISIT: Send to e-mail at: [email or web address]    Will anyone else be joining the visit? YES: How would they like to receive their invitation? Send to e-mail: PT's PCA  (If patient encounters technical issues they should call [phone]    How would you like to obtain your AVS? MyChart    Are changes needed to the allergy or medication list? No    Are refills needed on medications prescribed by this physician?     Reason for visit: RECHECK    Bruce LOCK
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
## 2024-04-08 NOTE — TELEPHONE ENCOUNTER
Patient confirmed scheduled appointment:     Date: 8/21  Time: 3:30 PM  Visit type: Return dermatology  Provider: Dr. Nunez  Location: [facility]  Testing/imaging: Patient's roommate to schedule lab closer to appointment

## 2024-04-10 NOTE — TELEPHONE ENCOUNTER
"Anastrozole 1mg tablet  Last prescribing provider: Dr. Opal Weber    Last clinic visit date: 6/15/23 w/ Joellen Choe    Recommendations for requested medication (if none, N/A): 6/15/23, \"She declined surgical excision and is on anastrozole for left breast DCIS.  She has now been on anastrozole 2 years, 4 months.  She is tolerating it well. Plan to continue this.\"    Any other pertinent information (if none, N/A): fax request    Refilled: Y/N, if NO, why?    "

## 2024-04-11 NOTE — TELEPHONE ENCOUNTER
Left Voicemail (1st Attempt) for the patient to call back and schedule the following:    Appointment type: return core   Provider: patricia  Return date: 05/31/24  Specialty phone number: [phone] opt 1  Additional appointment(s) needed: labs  Additonal Notes: n/a

## 2024-04-11 NOTE — TELEPHONE ENCOUNTER
Malika Thakur se pt MyChart - patient requesting tolterodine ER be prescribed BID as previously. Please review and advise if this updated frequency is appropriate.        Refill request already in process with Oncology team for Anastrozole. Onc RN routed to Joellen Choe PA-C on 4/10/24.    SONIA HermosilloN, RN (she/her)  [facility] RN

## 2024-04-11 NOTE — PROGRESS NOTES
[facility] CORE Clinic - CardioMEMS Reading Review    April 11, 2024   CardioMEMS reviewed.  Current diuretic: Bumex 3 mg daily  Recent plan of care changes:  4/4/24 we did a 3 day increase of Bumex to 4 mg daily for 4 days.   Note from roommate Odalis after elevated reading of 15 sent earlier this week that they had taken that reading differently. Have been taking them sitting up on commode, but they had done that reading with the pillow pad a little lower than other readings. Advised that consistency is key and to send the readings the same way daily. They did it the usual way the next two readings, and these are both still elevated at 8.   Will review with provider.     Current Threshold parameters:         Today's Waveform:       Readings:               Rosina Mays RN

## 2024-04-12 NOTE — PROGRESS NOTES
Reviewed with Austin Miguel NP.   Date: 4/12/2024    Time of Call: 9:06 AM     Diagnosis:  HF     [ VORB ] Ordering provider: Austin Baird NP  Order: Increase Bumex to 4 mg daily, BMP in one week     Order received by: Rosina Mays, RN      Follow-up/additional notes: Called Odalis and reviewed this with her, she verbalized understanding. Called Krissy, home care RN with [facility], left detailed message giving verbal orders to draw labs. Asked her to call back with fax number if fax needed.

## 2024-04-17 NOTE — PROGRESS NOTES
[facility] CORE Clinic - CardioMEMS Reading Review    April 17, 2024   CardioMEMS reviewed.  Current diuretic: Bumex 4 mg daily  Recent plan of care changes: Last week did permanent increase of Bumex to 4 mg daily.  Supposed to be getting labs end of this week. PA pressures yesterday still elevated at 10. Today is 13 but waveform (below) is suspect, likely not an accurate reading.     Left message for Odalis to check in on weights and symtoms and asked for call back. Mychart sent.   Current Threshold parameters: 3-6        Today's Waveform -       Readings:               Rosina Mays RN

## 2024-04-17 NOTE — TELEPHONE ENCOUNTER
Patient confirmed scheduled appointment:  Date: 06/06/24  Time: 9:30 am  Visit type: return core   Provider: ina   Location: [facility] [place]  Testing/imaging: labs   Additional notes: n/a

## 2024-04-18 NOTE — TELEPHONE ENCOUNTER
4/18 Talked with Odalis to schedule follow-up with Dr. Dominguez, pt is scheduled for CORE on 6/6 and Odalis is wondering if we can push out the follow-up with Dr. Dominguez. Messaged nurses [facility]

## 2024-04-18 NOTE — TELEPHONE ENCOUNTER
Date: 4/18/2024    Time of Call: 11:32 AM     Diagnosis:  HF      [ VORB ] Ordering provider: patricia Miguel CNP   Order:   -BMP tomorrow or Monday 4/22     Order received by: Stacey Neal RN       Follow-up/additional notes: [employer] message sent to pt to schedule lab appt.

## 2024-04-25 NOTE — PROGRESS NOTES
[facility] Heart - C.O.R.E. Clinic:  CardioMems Routine Remote Evaluation     Dates: 3/24/24 through 4/22/24    Adjustments made in the last month:   4/2 - increased bumex to 4 mg daily for 4 days  4/12 - permanently increased bumex to 4 mg daily     These adjustments have been discussed with the patient/caregiver and they express verbal understanding.    Threshold Alert Settings: 3-6 PAD    Readings:               Future Appointments   Date Time Provider Department Center   6/6/2024  9:30 AM Magdalena Hall PA-C [facility]   6/17/2024  2:30 PM Opal Weber MD [facility]   9/4/2024  4:00 PM Ora Nunez MD [facility]

## 2024-04-25 NOTE — PROGRESS NOTES
[facility] CORE Clinic - CardioMEMS Reading Review    April 25, 2024   CardioMEMS reviewed.  Current diuretic: Bumex 4 mg daily  Recent plan of care changes:  have made permanent increase of Bumex to 4 mg daily from 3 mg daily.     Did get labs yesterday, results in EPIC. PAD is now in goal range.     Current Threshold parameters:         Today's Waveform:       Readings:               Rosina Mays RN

## 2024-04-25 NOTE — TELEPHONE ENCOUNTER
Forms/Letter Request    Type of form/letter: Home Health Certification      Do we have the form/letter: Yes: Completed in outgoing [facility] TEAM 2  Faxed to [phone]  Copy sent stat to abstract  Copy kept in clinic

## 2024-04-28 NOTE — TELEPHONE ENCOUNTER
metoprolol succinate ER (TOPROL XL) 25 MG 24 hr tablet 135 tablet 3 4/22/2024       Should have refills on file. Pharmacy sent message. Rx refill denied    Yudleka Rangel RN  P Red Flag Triage/MRT

## 2024-05-02 NOTE — PROGRESS NOTES
[facility] CORE Clinic - CardioMEMS Reading Review    May 2, 2024   CardioMEMS reviewed.  Current diuretic: Bumex 4 mg daily  Recent plan of care changes: none. Last two readings slightly high but trending down. [employer] sent asking them to make sure to send a reading tomorrow.     Current Threshold parameters:   PAD goal 3-6      Today's Waveform:       Readings:               Rosina Mays RN

## 2024-05-03 NOTE — PROGRESS NOTES
Reviewed with Austin Adams NP.     Date: 5/3/2024    Time of Call: 3:45 PM     Diagnosis:  HF     [ VORB ] Ordering provider: Austin Adams NP  Order: Increase Bumex to 5 mg daily x 3 days.      Order received by: Rosina Mays RN      Follow-up/additional notes: reviewed plan with geeta and she verbalized understanding. she thinks she may have had a gout incident this week, took colchicine for a few days and it resolved. .

## 2024-05-03 NOTE — PROGRESS NOTES
DISCHARGE  Reason for Discharge: Patient has failed to schedule further appointments.    Equipment Issued: home program    Discharge Plan: return with new orders if needs    Referring Provider:  Malika Wolf       08/28/23 0500   Appointment Info   Signing clinician's name / credentials Eden Owen, PT,CHERYL   Total/Authorized Visits 4   Visits Used 1   Medical Diagnosis Lymphedema   PT Tx Diagnosis Lymphedema L LE   Quick Adds Certification   Progress Note/Certification   Start of Care Date 08/28/23   Onset of illness/injury or Date of Surgery 07/28/23  (Chronic but worsened such that she was not able to use her compression velcro wraps.)   Therapy Frequency 4 visits over 90 days as needed   Predicted Duration 3 months   Certification date from 08/28/23   Certification date to 11/25/23   Progress Note Due Date 08/28/23   Progress Note Completed Date 11/25/23   PT Goal 1   Goal Identifier Lymphedema Home Program   Goal Description Pt, with help of caregiver, will be independent with drainage exercise and positioning to best manage swelling to decrease symptoms.   Target Date 10/26/23   PT Goal 2   Goal Identifier Volume   Goal Description Pt will have 10% reduction in L LE volume for improved tissue integrity, decreased risk of infection, and decreased pain.   Target Date 10/26/23   PT Goal 3   Goal Identifier LLIS   Goal Description Pt will have at least 7 point reduction in score on subsequent assessment to reflect the MCID in impact of swelling on quality of life.   Goal Progress initial: 49   PT Goal 4   Goal Identifier Maintenance   Goal Description Pt will use compression garments as instructed AND home management tools/program for long term management of chronic condition to prevent tissue fibrosis, infection, wound and other secondary sequelae   Target Date 11/25/23   Subjective Report   Subjective Report history of swelling, recently worse.  Pt is very sedentary, cardiac history with monitor in  heart for cardiac fluid.   Objective Measures   Objective Measures Objective Measure 1;Objective Measure 2   Objective Measure 1   Objective Measure L LE volume   Details 5900mL   Objective Measure 2   Objective Measure edema   Details full and tight L LE, warm.  not pink or hot, just full, no pitting   Treatment Interventions (PT)   Interventions Manual Therapy;Therapeutic Procedure/Exercise;Self Care/Home Management   Therapeutic Procedure/Exercise   Therapeutic Procedures: strength, endurance, ROM, flexibility minutes (85699) 10   Ther Proc 1 Edema Drainge Exercise for legs   Ther Proc 1 - Details pt educated on importance of movement for facilitating lymphatic flow.  Pt educated on deep breathing to promote lymphatic central pump then instruction on skin stretch with ROM: heelslides and ankle pumps along with muscle pump with gluteal sets. Pt needs help with heelslides, she was able to complete glut sets and ankle pumps performed supine and seated.   Patient Response/Progress issued PTRx handout   Self Care/home Management   ADL/Home Mgmt Training (27034) 31   Self Care 1 home management and risk reduction education   Self Care 1 - Details Pt is educated on basic edema management principles. With LE swelling, pt is encouraged to elevate legs whenever possible including raising feet when sitting and taking opportunity to lie down for short periods during the day if able.  Pt currently spending a lot of time in bed but her L LE falls off the side of the bed since she got a new bed about 3 mo ago.  CLT educated on direct relationship between dependent positioning and increase in swelling is very likely.  Pt is instructed to avoid long time standing or sitting. If standing try to sit and elevate, if sitting get up to walk around for about 1 minute for every 30 minutes seated at table or desk, or infront of television. Pt is encouraged to drink lots of water to flush edema, about 6-8 glasses daily througout the day  unless otherwise instructed by medical team. Pt educated to avoid excess salt in diet unless known to to have low sodium.  Pt is also reminded to take all medications, especially diuretics as prescribed.  Her friend is very helpful with ensuring all this and medication management. Pt is educated to avoid constriction of clothing Pt is educated on heat and cold can increase swelling but causing more blood flow and fluid filtering into tissues or conversely slowing the flow of lymph in the cold.  Signs of infection and importance of good skin care to reduce risk of inflammation or infection are reviewed.   Patient Response/Progress Written handout provided, pt with understanding.   Self Care 2 Compressin   Self Care 2 - Details Pt and caregiver not interested in wrapping with foam and unableto attend therapy with enought frequency to make this realistic.  Pt does have sigvaris compreflex wraps size L. CLT applied to the R LE with good fit.  Pt was measured for size XXL which caregiver will purchase online for the L LE.  Today size G flexigrip applied to use during day.  If rolling down remove to avoid construction. pt repots feels tight but did not ask for it to be removed.   Eval/Assessments   PT Eval, Low Complexity Minutes (39082) 20   Education   Learner/Method Patient;Caregiver   Education Comments plan, home management goals   Plan   Home program exercises, walk in house, get L LE up in the bed for night   Updates to plan of care call for return visit in 2-3 weeks, can't make it to therapy often, will order compression, wants home therapy after   Plan for next session how is home management, measurement, garment traning if needed, request home therapy referral from [facility]   Total Session Time   Timed Code Treatment Minutes 41   Total Treatment Time (sum of timed and untimed services) 61

## 2024-05-03 NOTE — PROGRESS NOTES
Remote monitoring of Pulmonary Artery Pressures via CardioMEMS device reviewed at least weekly and as needed with CORE nursing. Diuretics adjusted accordingly.     Billing dates 3/24/24 through 4/22/24     Austin HOLTC

## 2024-05-03 NOTE — PROGRESS NOTES
Mems sent today elevated up at 9. (Goal 3-6). Updating provider.        Pre-charting complete. Patient has Medicare, care gaps identified cannot be performed in the clinic.

## 2024-05-06 NOTE — TELEPHONE ENCOUNTER
Prior Authorization Retail Medication Request    Medication/Dose: ferrous gluconate (FERGON) 324 (38 Fe) MG tablet   Diagnosis and ICD code (if different than what is on RX):  [I50.32]     Insurance   Primary: MEDICARE   Insurance ID:      [id number]     Pharmacy Information (if different than what is on RX)  Name:    [id number] - [place], [place] - [facility] [address] AT [facility] [address]     Phone:  [phone]   Fax:    [phone]

## 2024-05-08 NOTE — PROGRESS NOTES
[facility] CORE Clinic - CardioMEMS Reading Review    May 8, 2024   CardioMEMS reviewed.  Current diuretic: Bumex 4 mg daily  Recent plan of care changes: Friday did a 3 day increase to 5 mg daily. PAD is still elevated last three days at 7, 7, and today is 12.     Called to check in. Left message for Odalis and asked for call back.  [employer] sent to check in.     Current Threshold parameters:         Today's Waveform:       Readings:               Rosina Mays RN

## 2024-05-08 NOTE — PROGRESS NOTES
Reviewed with Austin Miguel NP.   Date: 5/8/2024    Time of Call: 3:45 PM     Diagnosis:  HF     [ VORB ] Ordering provider: Austin Miguel NP  Order: Bumex 4 mg BID for 2 days     Order received by: Rosina Mays RN      Follow-up/additional notes:   Talked to Odalis. She reports Mariel is doing a little better than she was a week ago. She had the PCA come do a bath with her today and the person that got her in the shower said her breathing was better than it was last week.   Odalis thinks her legs look improved from last week. Asked them to please make sure to send a reading tomorrow.

## 2024-05-09 NOTE — PROGRESS NOTES
Reviewed with Austin Miguel NP. Called Odalis back with following recommendations:  Date: 5/9/2024    Time of Call: 4:05 PM     Diagnosis:  HF     [ TORB ] Ordering provider: Austin Miguel NP  Order: Bumex at 4 mg in AM, 2 mg PM for 2 days, then return to 4 mg daily.      Order received by:Rosina Mays RN      Follow-up/additional notes: reviewed with Odalis who verbalized understanding. She did want to let us know that about a month ago they changed the place where they do the mems readings and she's wondering if we are seeing a new normal.   They do the readings sitting on the commode, previously had done in a chair. Have been taking them sitting up for quite a while and slipping the mems machine behind Mariel.   Will update provider.        \

## 2024-05-10 NOTE — TELEPHONE ENCOUNTER
General Call      Reason for Call:  medical records request for use of the FreeStyle Kaylah from [facility] see patients mychart message regarding this    What are your questions or concerns:  to confirm the clinical guidelines have been met    Date of last appointment with provider: 3/22/2024     Fax or e-mail back information on form

## 2024-05-10 NOTE — TELEPHONE ENCOUNTER
I'm not clear what the question is or who was calling - it looks like Malika did this form already?    Let me know if there are other things we can help with.    Dr. Tricia Hdz MD / [facility]

## 2024-05-10 NOTE — TELEPHONE ENCOUNTER
[facility] [place] [facility] returning call. They did receive the requested documentation, which states patient is insulin dependent, but they also need an order/documentation of patient's insulin prescription as well, which they did not receive.     TC: please fax patient's order for insulin glargine (Lantus) to [email or web address] at [phone].    Thanks,  Sandra Sutton RN BSN  [facility]

## 2024-05-17 NOTE — PROGRESS NOTES
[facility] CORE Clinic - CardioMEMS Reading Review    May 17, 2024   CardioMEMS reviewed.  Current diuretic: Bumex 4 mg daily  Recent plan of care changes: instructed to do a 2 day increase to 4/2 mg BID. Only did 1 day, but PAD is back in normal range.     Current Threshold parameters:         Today's Waveform:       Readings:               Rosina Mays RN

## 2024-05-18 NOTE — TELEPHONE ENCOUNTER
PA Initiation    Medication: FERROUS GLUCONATE 324 (38 FE) MG PO TABS  Insurance Company: [employer] Part D - Phone [phone] Fax [phone]  Pharmacy Filling the Rx: [id number] - [place], [place] - [address] AT [facility] [address]  Filling Pharmacy Phone:    Filling Pharmacy Fax:    Start Date: 5/18/2024    [id number]

## 2024-05-20 NOTE — TELEPHONE ENCOUNTER
Called patient, spoke with Mariel's roommate (Odalis) CTC on file. Informed her of below.    Odalis says they have been doing it this way for a long time (getting it OTC).     PILAR Yousif RN  [facility]

## 2024-05-20 NOTE — TELEPHONE ENCOUNTER
PRIOR AUTHORIZATION DENIED    Medication: FERROUS GLUCONATE 324 (38 FE) MG PO TABS  Insurance Company: [employer] Part D - Phone [phone] Fax [phone]  Denial Date: 5/20/2024  Denial Reason(s): Medicare does not cover OTC    Appeal Information: N/A, no appeal available  Patient Notified: yes, via insurance
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
## 2024-05-22 NOTE — ED TRIAGE NOTES
Pt biba from home w/ c/o dizziness. Per EMS who provides report, home health aide was helping pt w/ ADL's when she had a near- syncopal episode. No fall.  en route, 91% on RA- 3L applied. Pt at baseline cognition per EMS who called family to establish baseline.
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
## 2024-05-22 NOTE — ED TRIAGE NOTES
Triage Assessment (Adult)       Row Name 05/22/24 1314          Triage Assessment    Airway WDL WDL        Respiratory WDL    Respiratory WDL X        Cardiac WDL    Cardiac Rhythm NSR;SB        Peripheral/Neurovascular WDL    Peripheral Neurovascular WDL WDL        Cognitive/Neuro/Behavioral WDL    Cognitive/Neuro/Behavioral WDL X;orientation     Level of Consciousness intermittent confusion     Arousal Level opens eyes spontaneously     Orientation disoriented to;place;time;situation     Speech other (see comments)  slow     Mood/Behavior calm;cooperative        Pupils (CN II)    Pupil PERRLA yes     Pupil Size Left 3 mm     Pupil Size Right 3 mm        [place] Coma Scale    Best Eye Response 4-->(E4) spontaneous     Best Motor Response 6-->(M6) obeys commands     Best Verbal Response 4-->(V4) confused     [place] Coma Scale Score 14     Assessment Qualifiers patient not sedated/intubated

## 2024-05-23 NOTE — PROGRESS NOTES
[facility] - CardioMEMS Reading Review    May 23, 2024   CardioMEMS reviewed.  Current diuretic: Bumex 4 mg daily  Recent plan of care changes:     Current Threshold parameters:         Today's Waveform:       Readings:               Rosina Mays RN

## 2024-05-24 NOTE — TELEPHONE ENCOUNTER
[facility] ([place])    Reason for call: Lab Result Notification     Lab Result (including Rx patient on, if applicable).  If culture, copy of lab report at bottom.  Lab Result: See below  Cephalexin (Keflex) 500 mg capsule, 1 capsule (500 mg) by mouth 2 times daily for 14 days.   Creatinine Level (mg/dl)   Creatinine   Date Value Ref Range Status   05/22/2024 1.64 (H) 0.51 - 0.95 mg/dL Final   07/09/2021 1.31 (H) 0.52 - 1.04 mg/dL Final    Creatinine clearance (ml/min), if applicable    Serum creatinine: 1.64 mg/dL (H) 05/22/24 1332  Estimated creatinine clearance: 40.1 mL/min (A)     Patient's current Symptoms:   Unable to reach patient and unable to leave a message. Will send a letter.     RN Recommendations/Instructions per [place] ED lab result protocol:   [facility] ED lab result protocol utilized: urine culture      Lino Smith RN

## 2024-05-24 NOTE — LETTER
May 24, 2024        Mariel Ribeiro  [address] [place] [address]          Dear Mariel Ribeiro:    You were seen in the [facility] Emergency Department at [facility] EMERGENCY DEPARTMENT on 5/24/2024.  We are unable to reach you by phone, so we are sending you this letter.     It is important that you call [facility] Emergency Department lab result nurse at [phone], as we have information to relay to you AND/OR we MAY have to make some changes in your treatment.    Best time to call back is between 9AM and 5:30PM, 7 days a week.      Sincerely,     [facility] Emergency Department Lab Result RN  [phone]

## 2024-05-24 NOTE — TELEPHONE ENCOUNTER
Transitions of Care Outreach  Chief Complaint   Patient presents with    ER F/U     UTI       Most Recent Admission Date:  5/22/24  Most Recent Admission Diagnosis:  dizziness    Most Recent Discharge Date:  5/23/24  Most Recent Discharge Diagnosis:  Transient neurological symptoms    Transitions of Care Assessment    Discharge Assessment  How are you doing now that you are home?: She did not have a stroke; did have a UTI  How are your symptoms? (Red Flag symptoms escalate to triage hotline per guidelines): Improved  Do you know how to contact your clinic care team if you have future questions or changes to your health status? : No  Does the patient have their discharge instructions? : Yes  Does the patient have questions regarding their discharge instructions? : No  Were you started on any new medications or were there changes to any of your previous medications? : Yes (cephalexin)  Does the patient have all of their medications?: Yes  Do you have questions regarding any of your medications? : No  Do you have all of your needed medical supplies or equipment (DME)?  (i.e. oxygen tank, CPAP, cane, etc.): Yes    Follow up Plan     Discharge Follow-Up  Discharge follow up appointment scheduled in alignment with recommended follow up timeframe or Transitions of Risk Category? (Low = within 30 days; Moderate= within 14 days; High= within 7 days): Yes    Future Appointments   Date Time Provider Department Center   6/6/2024  5:00 PM Opal Weber MD [facility]   6/10/2024  1:00 PM Malika Wolf APRN CNP [facility]   7/10/2024 11:15 AM Rea Dominguez MD [facility]   9/4/2024  4:00 PM Ora Nunez MD [facility]       Outpatient Plan as outlined on AVS reviewed with patient.    For any urgent concerns, please contact our 24 hour nurse triage line: [phone] ([phone])       Nina Patel RN

## 2024-05-30 NOTE — TELEPHONE ENCOUNTER
Patient does not meet criteria for DARIUS 13 and PHQ9    Aileen Durant RN, BSN   Nephrology RN Care Coordinator   [facility]

## 2024-05-30 NOTE — PROGRESS NOTES
[facility] - CardioMEMS Reading Review    May 30, 2024   CardioMEMS reviewed.  Current diuretic: Bumex 4 mg daily  Recent plan of care changes: none. PAD in goal range this week.     Current Threshold parameters:         Today's Waveform:       Readings:               Rosina Mays RN

## 2024-05-30 NOTE — TELEPHONE ENCOUNTER
Patient does not meet criteria for DARIUS 13 and PHQ9    Aileen Durant RN, BSN   Nephrology RN Care Coordinator   [facility]

## 2024-06-05 NOTE — TELEPHONE ENCOUNTER
Forms/Letter Request    Type of form/letter: POLST      Do we have the form/letter: Yes: Placed in care team 2    Who is the form from? Home care    Where did/will the form come from? form was faxed in    When is form/letter needed by: as soon as able    How would you like the form/letter returned: Fax : [phone]

## 2024-06-06 NOTE — NURSING NOTE
Is the patient currently in the state of [place]? YES    Visit mode:VIDEO    If the visit is dropped, the patient can be reconnected by: VIDEO VISIT: Text to cell phone:   Telephone Information:   Mobile [phone]       Will anyone else be joining the visit? NO  (If patient encounters technical issues they should call [phone]    How would you like to obtain your AVS? MyChart    Are changes needed to the allergy or medication list?  Pt's sister states no changes to pt's allergies or medications except she is taking Cephalexin which is currently on her med list.    Are refills needed on medications prescribed by this physician? NO    Reason for visit: RECHECK    Hansel LOCK
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
## 2024-06-10 NOTE — PROGRESS NOTES
Mariel is a 79 year old who is being evaluated via a billable video visit.    How would you like to obtain your AVS? MyChart  If the video visit is dropped, the invitation should be resent by: Text to cell phone: [phone]  Will anyone else be joining your video visit? Yes: roommate, Odalis. How would they like to receive their invitation? Text to cell phone: [phone]      Assessment & Plan     Near syncope  Complex issue, could be related to multiple factors.  Possibly TIA, hypoglycemia, vasovagal syncope, polypharmacy.  Odalis her care taker is making a lot of effort to keep Mariel in the home and avoid nursing home which has been suggested by EMT after the multiple falls in the apartment they have responded to. Odalis is adding a ramp and bought a wheelchair, but Mariel lacks the motivation and cognition to actively participate in therapy.  Will continue to provide support as able, but Mariel has difficulty coming into clinic to be assessed by me.  This also limits our options for therapy.  She was recently graduated from [employer] and I do not think she will qualify for another referral at present. I offered MTM referral to evaluate medications as I have expressed my concerns that polypharmacy is playing a role in her falls and mentation.  Mariel was very inattentive and sedated during our video visit today.  We could also consider care coordination, but I am not sure there is much else other than transportation assistance that could be offered.  Mariel already has a PCA assisting.    Left-sided weakness  See above.  On statin and aspirin in the event this was TIA    Chronic diastolic heart failure (H)  Follows with cardiology- on bumex which could be contributory to falls.    Falls frequently  See above  - Med Therapy Management Referral    Dizziness  See above, did offer PT referral, but at this time they have difficulty leaving the home.    - Med Therapy Management Referral    Chronic left shoulder  "pain  Likely 2/2 to shoulder arthritis, less likely cervical stenosis noted on MRI.  She is not candidate for surgical repair and likely would not benefit from therapy.  We can consider a steroid injection if she can make it into clinic.     Functional urinary incontinence  Worsening lately, likely multifactorial.  I would not increase her detrol and already have concerns about this medication contributing to fall risk.  She would not benefit from therapy.  Would advise scheduled toileting.  - Med Therapy Management Referral    Cognitive deficits  Unsure if she would benefit from OT referral and this is difficult for her to complete right now.  Suspect some of it is from isolation and inactivity.    - Med Therapy Management Referral    Polypharmacy  See above, agreeable to referral to MTM to evaluate medications.   - Med Therapy Management Referral      The longitudinal plan of care for the diagnosis(es)/condition(s) as documented were addressed during this visit. Due to the added complexity in care, I will continue to support Mariel in the subsequent management and with ongoing continuity of care.      45 minutes spent by me on the date of the encounter doing chart review, history and exam, documentation and further activities per the note  MED REC REQUIRED  Post Medication Reconciliation Status: patient was not discharged from an inpatient facility or TCU        Subjective   Mariel is a 79 year old, presenting for the following health issues:  Hospital F/U      6/10/2024    12:32 PM   Additional Questions   Roomed by Hanna BAILON   Accompanied by O[place]lis     Video Start Time:  1303    HPI     Following up from recent ER visit.    2 concerns today    Seen in the ED for going into a \"haze\" almost like fainting.  PCA and RN were there during the episode.  Left sided weakness.  Recovered from initial symptoms really fast.      Had a similar episode, they did not go to the ER for the second event.  Happened the following " "Sunday.  PCA came again during bathing went back into a similar \"mode\" and weakness.      NO longer under the care of the home health agency through Medicare.      Having mobility issues, seems like she is afraid she is going to fall.  Odalis ordered a transport wheelchair, ramp will be going in soon.  It is a challenge keeping her strength up, but she is reluctant to walk or move.     Now has a smith 3, works well.  Needed to go through a medical supply company to get supplies.   Helping with glucose monitoring.     Cognitive skills are not always strong, has trouble following direction.  Some days better than others.      Arm pain.  Xray done in the ED.  Also MRI for the stroke work up. Wondering if gout flare.     Incontinence has been a bigger issue lately.                     Objective           Vitals:  No vitals were obtained today due to virtual visit.    Physical Exam   GENERAL: no distress, frail, elderly, and fatigued  EYES: Eyes grossly normal to inspection.  No discharge or erythema, or obvious scleral/conjunctival abnormalities.  RESP: No audible wheeze, cough, or visible cyanosis.    SKIN: Visible skin clear. No significant rash, abnormal pigmentation or lesions.  NEURO: Cranial nerves grossly intact.  Mentation and speech appropriate for age.  PSYCH: concentration poor and inattentive          Video-Visit Details    Type of service:  Video Visit   Video End Time: 1336  Originating Location (pt. Location): Home    Distant Location (provider location):  Off-site  Platform used for Video Visit: Guy  Signed Electronically by: Malika Wolf DNP    Answers submitted by the patient for this visit:  Patient Health Questionnaire (Submitted on 6/9/2024)  If you checked off any problems, how difficult have these problems made it for you to do your work, take care of things at home, or get along with other people?: Extremely difficult  PHQ9 TOTAL SCORE: 16    "

## 2024-06-12 NOTE — PROGRESS NOTES
"[facility] - CardioMEMS Reading Review    June 12, 2024   CardioMEMS reviewed.  Current diuretic: Bumex 4 mg daily  Recent plan of care changes: 6/6/24 increased bumex to 5 mg daily for 2 days. PAD trended down to 5, now is back up.     Received mychart from patients roommate this morning that she wanted to confess they did not do the extra bumex. See copied note:     \"I have a confession. I did not do the two days of 6 bumex. You should have some readings, though. Right now we are having real trouble managing incontinence. Mariel sleeps a lot and it would be a huge deal to get her up more frequently to sit on the commode, if that even would be productive. Right now we are going through a couple nightgowns a day, and we are using a heavy duty pull-up.\"    Will update provider.   Current Threshold parameters:         Today's Waveform:       Readings:               Rosina Mays RN                 "

## 2024-06-12 NOTE — PROGRESS NOTES
Remote monitoring of Pulmonary Artery Pressures via CardioMEMS device reviewed at least weekly and as needed with CORE nursing. Diuretics adjusted accordingly.     Billing 4/23/24 through 5/22/24     Austin CORNEJO NP-C

## 2024-06-20 NOTE — PROGRESS NOTES
[facility] - CardioMEMS Reading Review    June 20, 2024   CardioMEMS reviewed.  Current diuretic: Bumex 4 mg daily  Recent plan of care changes: none. One reading sent this week but is in goal range.     Current Threshold parameters:         Today's Waveform:       Readings:               Rosina Mays RN

## 2024-06-24 NOTE — PROGRESS NOTES
[facility]:  CardioMems Routine Remote Evaluation     Dates: 5/23/24 through 6/21/24    Adjustments made in the last month:   6/6/24 - recommended bumex increase to 5 mg daily x2 days for high PAD. Patient's roommate later the next week reported increase was not made due to incontinence issues and difficulty getting up to commode. PAD did trend down back into goal range without intervention.       No adjustments made this month.  Discussed with patient/caregiver and they will continue current plan of care.     Threshold Alert Settings: Goal PAD 3-6     Readings:         Future Appointments   Date Time Provider Department Center   7/3/2024  1:30 PM Karen Hilton, [facility]   7/10/2024 11:15 AM Rea Dominguez MD [facility]   9/4/2024  4:00 PM Ora Nunez MD [facility]

## 2024-06-27 NOTE — PROGRESS NOTES
[facility] - CardioMEMS Reading Review    June 27, 2024   CardioMEMS reviewed.  Current diuretic: Bumex 4 mg daily  Recent plan of care changes: none. latest PAD in goal range.     Current Threshold parameters:         Today's Waveform:       Readings:               Rosina Mays RN

## 2024-06-30 NOTE — PROGRESS NOTES
Medication Therapy Management (MTM) Encounter    ASSESSMENT:                            Medication Adherence/Access: No issues identified    Diabetes:   Stable.  Patient is meeting A1c goal of < 8%.  Self monitoring of blood glucose is not at goal of fasting  mg/dL and post prandial < 180 mg/dL. Last 1-2 weeks.  Patient is not meeting goal of > 70% time in target with continuous glucose monitoring.   Patient would benefit from lifestyle changes/insulin dosing.   Mtm will try to obtain smith-3 cgm readings thru libreview.       Urinary Frequency/incontinence: Tolterodine -anticholinergic may be worsening dementia issues --lets stop that drug and switch to newer beta-3 agonist Myrbetriq --see if this improved cognitive issues over time?      Depression/mental health:  Not meeting goal. Patient would benefit from : increase venlafaxine dose to 150mg./day now , f/up in 4 weeks --consider wellbutrin/abilify in future . See plan for details.         Gout:   Stable. Patient is at goal of uric acid <6mg/dl. Ok to take tylenol and topical voltaren gel 15 for severe gout pain in shoulder --see plan for details.       PLAN:                            1. Mariel/Odalis: --your drug Tolteridine for incontinence has anti-cholinergic affects that might be causing dementia like side effects --lets stop that drug today and I sent rx for a newer incontinence medication called Myrbetriq 25mg tablet --1 tab daily --let see if this works better for you?    2. Mariel -you are sleeping a lot and seem to lack motivation to do things --let increase your antidepressant daily dose of Venlafaxine --I sent in rx for 150mg./day dose --at that dose it should trigger your neurotransmitter in your brain called Norepinephrine to provide pep/energy --lets see over next 4 weeks if that makes a difference.  We can also consider Bupropion or aripiprazole in the future if we need additional help.    3.For your neck/shoulder pain--ok to use topical  Voltaren gel 1% 2-4 x day when in pain, otherwise Tylenol orally is ok as well.  4. Voltaren gel--ok to use neckpain /shoulder if severe.tytlenol ok.    5. Blood sugars trending higher per your smith -3 reader device -- watch carbs in diet , try to move more for exercise.      Follow-up: Return in about 4 weeks (around 7/31/2024), or @ 1:30 PM via video, for Medication Therapy Management Visit, Depression, incontinence.    SUBJECTIVE/OBJECTIVE:                          Mariel Ribeiro is a 79 year old female contacted via secure video for an initial visit. She was referred to me from Malika Wolf  . Patient was accompanied by Odalis --caretaker--she states she has lost a lot of mental acuity but not alzheimers.(Hard for her to get words out)..   Odalis works full time - --loves the interesting cases she gets.works from home.      Reason for visit:   Polypharmacy, high fall risk.   Has mems implant in her heart --measures pressure in her heart --sends info to cards md--they modify her bumex.   UTI's: 2-3 last 8 months --can get cognitively worse. No current infection.        Allergies/ADRs: Reviewed in chart  Past Medical History: Reviewed in chart  Tobacco: She reports that she quit smoking about 62 years ago. Her smoking use included cigarettes. She has never used smokeless tobacco.  Alcohol: not currently using  Personal Healthcare Goals: improve depression, dementia issues.  Activity: sedentary  Assistive Devices: tremendous mobility problems --using wheelchair, can walk but odalis feels she is deconditioned, fear of falling.  Recent Falls:   Odalis states she falls quite a bit -- calls  for lift/assist --4-5 last 6 months as she cannot get up if she falls.  Has had physical therapy--6 weeks- ago--last visit --she improves then falls back.    She c/o pain in her arms--difficult to pull herself up.    No fracture in left arm but atherosclerosis in her shoulder, has a neck compression as  well.        Medication Adherence/Access: no issues reported      Type 2 Diabetes:    Lantus -32 units sub-q injection every morning.  Aspirin 81mg daily  Patient reports no current medication side effects.  Blood sugar monitoring: Continuous Glucose Monitor has smith-3 cgm--geeta uploaded it to her mtm unable to view the results.  Bs 7 days -191  Bs 14 days 176  30 168,   Current diabetes symptoms: fatigue  Diet/Exercise: trying to watch carbs, cannot do any exercise.  Eye exam: due  Foot exam: up to date    Lab Results   Component Value Date    A1C 7.5 12/05/2023    A1C 7.6 08/21/2023    A1C 7.5 12/19/2022    A1C 7.2 07/12/2022    A1C 6.8 03/09/2022    A1C 6.8 04/29/2021    A1C 6.9 06/24/2020    A1C 6.5 04/30/2019    A1C 5.2 09/10/2018    A1C 9.3 07/01/2018     Wt Readings from Last 5 Encounters:   12/01/23 290 lb (131.5 kg)   10/03/23 282 lb 3.2 oz (128 kg)   09/29/23 288 lb 9.6 oz (130.9 kg)   09/27/23 286 lb 9.6 oz (130 kg)   09/21/23 296 lb (134.3 kg)         Gout:   febuxostat - 80mg./day and has prn colchicine 0.6mg. --uses if c/o pain in arms/shoulders--geeta states it helps her.  Patient reports recent flare in her shoulder took colchicine x 3 days --showed improvement.   Has voltaren gel 1% --asking if she can use it for severe pain? Takes tylenol orally as well.  Patient is experiencing the following medication side effects: none.   Uric Acid   Date Value Ref Range Status   10/25/2023 4.8 2.4 - 5.7 mg/dL Final   08/28/2018 6.8 (H) 2.6 - 6.0 mg/dL Final         Depression/Mental health : more functional in am ,once in awhile has a cogent thought -maybe once a day . Unable to have a conversation with me.  Sometimes she is really out of it --especially late in day or if she is tired. Once in awhile talks gibberish.  Had neuro psych evaluation--hard to pinpoint what it is?  Venlafaxine 75mg once daily.   Patient reports no current medication side effects.  Current symptoms include: sleeps a lot , no interest  in anything.  Patient reports symptoms are worsened.  Patient reports the following stressors: none and chronic health condition  Patient is not seeing a therapist.   Patient is not seeing a psychiatrist.        1/8/2024     8:11 AM 3/22/2024     9:47 AM 6/9/2024     6:08 AM   PHQ   PHQ-9 Total Score 9 14 16   Q9: Thoughts of better off dead/self-harm past 2 weeks Not at all Not at all Not at all       Urinary Frequency/Incontinence: Pt takes tolterodine 2mg BID, which doesn't seem to be very helpful. She does still have incontinence overnight and wears pull ups, which are helpful. Other issue is dementia like issues --is this anti-cholinergic pill causing mental issues?  ------------------------------------------------------------------------------------------------------------------------    I spent 60 minutes with this patient today. All changes were made via collaborative practice agreement with Malika Wolf DNP. A copy of the visit note was provided to the patient's provider(s).    A summary of these recommendations was sent via [employer].    Karen Hilton [facility].  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

## 2024-07-03 NOTE — PROGRESS NOTES
[facility]:   CardioMEMS Reading Review    Current diuretic dose: bumex 4mg daily   Current potassium chloride dose: none     Last CMP Date5/22/2024    Recent plan of care changes: one reading this week, remains WNL      Current Threshold parameters: 3-6      Today's Waveform:         Readings:                  RHC Date    Wedge Pressure (PCW Pressure Mean)  Mems PAD during Cath (average of the 3 values)         6/17/2019 w/MEMS          implant                    20 mmHg                        18 mmHg     Patient to follow up as scheduled.    Future Appointments   Date Time Provider Department Center   7/3/2024  1:30 PM Karen Hilton, [facility]MT[facility]    7/10/2024 11:15 AM Rea Dominguez MD [facility]   9/4/2024  4:00 PM Ora Nunez MD [facility]

## 2024-07-03 NOTE — PATIENT INSTRUCTIONS
"Recommendations from today's MTM visit:                                                    MTM (medication therapy management) is a service provided by a clinical pharmacist designed to help you get the most of out of your medicines.   Today we reviewed what your medicines are for, how to know if they are working, that your medicines are safe and how to make your medicine regimen as easy as possible.      1. Mariel/Odalis: --your drug Tolteridine for incontinence has anti-cholinergic affects that might be causing dementia like side effects --lets stop that drug today and I sent rx for a newer incontinence medication called Myrbetriq 25mg tablet --1 tab daily --let see if this works better for you?    2. Mariel -you are sleeping a lot and seem to lack motivation to do things --let increase your antidepressant daily dose of Venlafaxine --I sent in rx for 150mg./day dose --at that dose it should trigger your neurotransmitter in your brain called Norepinephrine to provide pep/energy --lets see over next 4 weeks if that makes a difference.  We can also consider Bupropion or aripiprazole in the future if we need additional help.    3.For your neck/shoulder pain--ok to use topical Voltaren gel 1% 2-4 x day when in pain, otherwise Tylenol orally is ok as well.  4. Voltaren gel--ok to use neckpain /shoulder if severe.tytlenol ok.    5. Blood sugars trending higher per your smith -3 reader device -- watch carbs in diet , try to move more for exercise.      Follow-up: Return in about 4 weeks (around 7/31/2024), or @ 1:30 PM via video, for Medication Therapy Management Visit, Depression, incontinence.    It was great speaking with you today.  I value your experience and would be very thankful for your time in providing feedback in our clinic survey. In the next few days, you may receive an email or text message from [employer] with a link to a survey related to your  clinical pharmacist.\"     To schedule another MTM appointment, " please call the clinic directly or you may call the [facility] scheduling line at [phone] or toll-free at [phone].     My Clinical Pharmacist's contact information:                                                      Please feel free to contact me with any questions or concerns you have.      Karen Hilton [facility].  Medication Therapy Management Provider  [phone]

## 2024-07-03 NOTE — Clinical Note
Malika--angelina--thx for mtm referral--I did increase her venlafxine dose today --see if this perks her up a bit , also changed her from tolteridine to newer incontinence pill myrbetriq --less potential for dementa side effects.  4 weeks f/as-unu-cusim

## 2024-07-05 NOTE — PROGRESS NOTES
[facility]:   CardioMEMS Reading Review    Alert(s): PAD above threshold      Current diuretic dose: bumex 4mg daily   Current potassium chloride dose: 20meq bid     Last CMP Date5/22/2024       Recent plan of care changes: message out to check on symptoms, PAd at 11 yesterday above goal.       Current Threshold parameters: 3-6      Today's Waveform:         Readings:                  RHC Date    Wedge Pressure (PCW Pressure Mean)  Mems PAD during Cath (average of the 3 values)         7/1/2019 w/MEMS          implant                    20 mmHg                        18 mmHg     Patient to follow up as scheduled.    Future Appointments   Date Time Provider Department Center   7/10/2024 11:15 AM Rea Dominguez MD [facility]   7/31/2024  1:30 PM Karen Hilton, [facility] S[facility]MTM    9/4/2024  4:00 PM Ora Nunez MD [facility]
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
## 2024-07-10 NOTE — NURSING NOTE
Current patient location: [address] [place] [address]    Is the patient currently in the state of [place]? YES    Visit mode:VIDEO    If the visit is dropped, the patient can be reconnected by: VIDEO VISIT: Text to cell phone:   Telephone Information:   Mobile [phone]    and VIDEO VISIT: Send to e-mail at: [email or web address] Access    Will anyone else be joining the visit? Odails  (If patient encounters technical issues they should call [phone] :[phone])    How would you like to obtain your AVS? MyChart    Are changes needed to the allergy or medication list? No    Odalis denies any changes since echeck-in completion and states all information entered during echeck-in remains accurate.    Are refills needed on medications prescribed by this physician? NO, not that aware of     Reason for visit: RECHECK    Odalis states pts BP is good     Cristal Coello MA VVF

## 2024-07-10 NOTE — NURSING NOTE
Med Reconcile: Reviewed and verified all current medications with the patient. The updated medication list was printed and given to the patient. DECREASE metoprolol to 25mg every evening.     Return Appointment: Patient given instructions regarding scheduling next clinic visit. RTC for virtual cOre in 6 months. RTC to see Dr Andre in 1 year.     *Let us know if coming to clinic for other appts in future as we would likely order and schedule echocardiogram to coordinate.     Patient stated she understood all health information given and agreed to call with further questions or concerns.     Shayna Newell RN
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
## 2024-07-10 NOTE — PATIENT INSTRUCTIONS
Cardiology Providers you saw during your visit:  Dr. Dominguez     Medication changes:  1- CHANGE metoprolol to 25mg in the evenings     Follow up:  1- Follow up virtually with [facility] in 6 months  2 - Follow up with Dr Dominguez in 1 year      *if you are coming in for other appointments, let us know as we would also like to get an echocardiogram.        Please call if you have:  1. Weight gain of more than 2 pounds in a day or 5 pounds in a week  2. Increased shortness of breath, swelling or bloating  3. Dizziness, lightheadedness   4. Any questions or concerns.      Heart Failure Support Group  Support group is held virtually. Please reach out if you would like to attend and we can get you the information you need to log in.   2024 dates for support group meetings:    Monday, July 1st, 1-2pm     Monday, August 5th, 1-2pm     Monday, September 9th, 1-2pm     Monday, October 7th, 1-2pm     Monday, November 4th, 1-2pm     Monday, December 2nd, 1-2pm     Follow the American Heart Association Diet and Lifestyle recommendations:  Limit saturated fat, trans fat, sodium, red meat, sweets and sugar-sweetened beverages. If you choose to eat red meat, compare labels and select the leanest cuts available.  Aim for at least 150 minutes of moderate physical activity or 75 minutes of vigorous physical activity - or an equal combination of both - each week.     During business hours: [phone], press option # 1 to schedule an appointment or send a message to your care team     After hours, weekends or holidays: On Call Cardiologist- [phone]   option #4 and ask to speak to the on-call Cardiologist. Inform them you are a CORE/heart failure patient at the [place].     Shayna Newell, RN BSN  Cardiology Nurse Care Coordinator (Heart Failure / C.O.R.E.)

## 2024-07-11 NOTE — PROGRESS NOTES
Remote monitoring of Pulmonary Artery Pressures via CardioMEMS device reviewed at least weekly and as needed with [facility] nursing. Diuretics adjusted accordingly.      billing dates of 5/23/24 through 6/21/24      Austin CORNEJO NP-

## 2024-07-12 NOTE — PROGRESS NOTES
[facility] - CardioMEMS Reading Review    July 12, 2024   CardioMEMS reviewed.  Current diuretic: Bumex 4 mg daily  Recent plan of care changes: none    Current Threshold parameters:         Today's Waveform:       Readings:               Rosina Mays RN

## 2024-07-16 PROBLEM — N39.0 UTI (URINARY TRACT INFECTION): Status: ACTIVE | Noted: 2024-01-01

## 2024-07-16 PROBLEM — R41.82 ALTERED MENTAL STATUS: Status: ACTIVE | Noted: 2024-01-01

## 2024-07-16 NOTE — TELEPHONE ENCOUNTER
Given no improvement in pt status, and clinic internet outage/no DOD access, RN made increased disposition recommendation for pt to be transported via EMS to preferred ED.  It has been nearly 3 days without pt being able to assist in transfers, ambulation, repositioning, etc. Concerns for pressure injuries and moisture buildup due to caregivers being unable to properly reposition and clean pt for brief changes is concerning. Odalis in agreement with this increased disposition.    SONIA HermosilloN, RN (she/her)  [facility] RN

## 2024-07-16 NOTE — TELEPHONE ENCOUNTER
Nurse Triage SBAR    Is this a 2nd Level Triage? YES, LICENSED PRACTITIONER REVIEW IS REQUIRED    Situation: Caregiver Odalis calling, very tearful  States for the past 2-3 days patient has not been able/willing to assist with transfers. Unclear if this is a physical change or something cognitive.    Background/Assessment:   Odalis and PCA have not been able to get her well cleaned up and are now feeling Mariel may require a hospital admission given timeframe of decline in condition. Writer is inclined to agree given complex pt hx and acute changes.  When asking if there were any acute injuries or changes, Odalis states pt complains about pain all the time, so difficult to tell if there is any new or worsening pain currently occurring. States pt also has gout flares from time to time, which make her less inclined to participate in her cares.  Previous ED visit in May (5/23) with largely unremarkable workup.    Recommendation: routing to PCP for advisement on next best step. Odalis in agreement to have pt transported via EMS if advised.    SONIA HermosilloN, RN (she/her)  [facility] RN

## 2024-07-16 NOTE — TELEPHONE ENCOUNTER
Caregiver, Odalis Lowry, called again asking if there has been a response from Malika Wolf.   Writer let Odalis know that I would resent to Malika Wolf high alert.     SONIA SextonN RN  [facility]

## 2024-07-16 NOTE — TELEPHONE ENCOUNTER
Odalis calling again asking for recommendation. Routing high priority to [facility] pool for advisement.    CHEN Odom, BSN, RN (she/her)  [facility] RN

## 2024-07-16 NOTE — ED TRIAGE NOTES
"BIBA from home for mentation decreasing over the last few days. Patient unable to get out of bed or stand due to weakness. \"Slid out of bed\" on Saturday and was helped back to bed by EMS. Roommate called EMS today due to being unable to help care for the patient.         "

## 2024-07-17 PROBLEM — M10.9 GOUT: Status: ACTIVE | Noted: 2023-08-08
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
## 2024-07-17 NOTE — PLAN OF CARE
Goal Outcome Evaluation:      VS: BP 99/74 (BP Location: Left leg)   Pulse 69   Temp 97.8  F (36.6  C) (Axillary)   Resp 16   LMP  (LMP Unknown)   SpO2 100%      Output/Last BM: Incontinent of B/B, purewick in place. Pt roommate/caregiver states last BM was approx 1-2 days ago   Activity: 2x - not OOB   Skin/Dressing: Sandrita-area - red/excoriated    Pain: Denies pain   CMS: Oriented to self, knows birthday. Unable to answer other questions.   Diet: 2 Gram Sodium, Thin liquids   LDA: PIV Left forearm   Equipment:    Plan:    Additional Info: Too lethargic to work with PT today - pt needs to be reassessed.    Admission Questions completed with Roommate/Caregiver     Goal Outcome Evaluation:  Plan of Care Reviewed With: patient  Overall Patient Progress: No Change

## 2024-07-17 NOTE — ED NOTES
Pt is a hard stick.US IV was done but only got one set of blood cultures drawn and there was no anymore blood return.Provider notified and provider is ok to start antibiotics.
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
## 2024-07-17 NOTE — ED PROVIDER NOTES
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 awakening.  She has a soft abdomen clear lungs and is protecting her airway.  Differential diagnosis is broad includes infectious etiology, cardiac etiology, head bleed, amongst others.  Will initiate broad workup including EKG infectious workup CT head Noncon as well as CT abdomen Noncon patient has allergy to contrast did tell me at one point she had some abdominal pain.    Initial EKG without findings for ischemia or other arrhythmias.  Lactic acid within reference range 0.9.  CBC without leukocytosis or anemia, chemistry shows creatinine similar to prior without clinically significant electrolyte disturbance.  Patient hyperglycemic 124 consistent with history of diabetes.  With her AMS we also did obtain VBG which shows normal pH 7.34.  UA returned suggestive of infection with large leukocyte esterase greater than 182 WBCs and hematuria this was obtained off of straight catheterization.  Suggestive of infection patient be started on ceftriaxone.  Respiratory swab obtained reviewed negative urine culture pending blood cultures pending at time of dictation and signout awaiting remainder of imaging.  Disposition pending final overread's.  Patient  will be likely admitted to the medicine service for antibiotics and consideration of placement.    Patient seen and discussed with attending physician , who agrees with my plan of care.      I have reviewed the nursing notes. I have reviewed the findings, diagnosis, plan and need for follow up with the patient.    New Prescriptions    No medications on file       Final diagnoses:   UTI (urinary tract infection)   Altered mental status   Generalized weakness       MICAELA Courtney [facility] EMERGENCY DEPARTMENT  7/16/2024     Kim Rosa PA-C  07/16/24 2242  -------------------------------------------------------------------------------------------------  --    ED Attending Physician Attestation    I Arabella Kolb MD, cared for this patient with the Advanced Practice Provider (LIYAH). I personally provided a substantive portion of the care for this patient, including approving the care plan for the number and complexity of problems addressed and taking responsibility related to the risk of complications and/or morbidity or mortality of patient management. Please see the LIYAH's documentation for full details.    Summary of HPI, PE, ED Course   Patient is a 79 year old female evaluated in the emergency department for altered mental status, weakness.  Broad workup was undertaken, she does have evidence of urinary tract infection which certainly could explain her symptoms.  She was given ceftriaxone.  CBC within normal limits, CMP shows chronic CKD, bicarb is 21, electrolytes appear normal.  Lactate is normal.  Troponin and BNP are pending, we have called the lab multiple times on this.  EKG nonischemic.  Head CT per my read does not show any obvious bleed.  Radiology read shows no acute intracranial abnormality.  CT of the abdomen just shows evidence of cystitis.  At this time patient will be admitted to the medicine service, she might possibly need placement.  admitted to  inpatient.      Arabella Kolb MD  Emergency Medicine        Cortes, Arabella Ramos MD  07/16/24 0238

## 2024-07-17 NOTE — PROGRESS NOTES
BP (!) 155/66 (BP Location: Right arm, Patient Position: Right side, Cuff Size: Adult Large)   Pulse 103   Temp 97.4  F (36.3  C) (Oral)   Resp 16   LMP  (LMP Unknown)   SpO2 94%        Shift: [id number] Pt arrived from [place] emergency at 3:52am  Isolation Status: None  VS: stable on 4L NC  Neuro: Pt is disoriented and only alert to name  Behaviors: calm and cooperative, Pt is very sleepy  BG: none  Labs: reviewed  Respiratory: 02 was down to 83 upon arrival on RA and it went up to 95% while with the oxygen set at 4L. Pt uses a CPAP at night and it was ordered and should be available for the pt tonight as the RT called to say they are currently out but should have some by tonight.  Cardiac: WDL  Pain/Nausea: Pt is sleeping and denies pain or nausea  PRN: None given during this shift  Diet: NPO except for medications  IV Access: L PIV SL  Infusion(s): None  Lines/Drains: L PIV SL  GI/: Skin: Pt has lots of bruising around her body (arms mostly), Pt's ralph area is sore and has blisters barrier cream applied. (Skin check done by 2 RN Liane)   Mobility: up with assist 2, pivot  Plan: Continue plan of care and notify team of any changes

## 2024-07-17 NOTE — PROGRESS NOTES
"[facility]    Medicine Progress Note - Hospitalist Service, GOLD TEAM 21    Date of Admission:  7/16/2024    Assessment & Plan      Mariel Ribeiro is a 79 year old female past medical history significant for morbid obesity, COPD, migraines, Hfpef, history of CABG x 3, history of type 2 diabetes with stage III CKD, recurrent falls who presented with concerns for generalized weakness. admitted on 7/16/2024 for altered mental status possibly secondary to acute cystitis. Also found to have worsening heart failure now with severely reduced ejection fraction.     Today:   - Echocardiogram ordered, showed worsened EF since last echo 2 years ago. Cardiology consulted.   - Discussed goals with Mariel and her friend and POA Odalis Lowry. Mariel enjoys artwork, bird watching, time with with friends and family, and watching TV. Traditionally she has said that she wouldn't want aggressive care if \"not aware.\" Wanting to consider code status more, considering switching to DNR but wanting time to think and talk with more family members.   - Too sleepy to work with PT today but they will try again tomorrow. Odalis has PCA hours and some support at home but open to considering TCU and would like to know all options.     #Acute cystitis  #Functional and urge urinary incontinence  Urine infectious with elevated WBC and leuk esterase, pending urine culture. CT showed thickened bladder wall consistent with acute cystitis. Patient has been afebrile, no leukocytosis, no sign pyelonephritis on imaging. History of UTIs. Recently started on mirabegron in early July.  Holding mirabegron now as it can cause urinary retention and contribute to UTIs.   -Continue ceftriaxone daily  -Pending blood culture  -Pending urine culture    #Altered Mental Status, likely secondary to infection in the setting of underlying cognitive impairment  #Long history of multifactorial cognitive impairment (last neurology " note Nov 2022)  Head CT negative for bleed or acute pathology. Long history of mild difficulty with memory and work-finding that is often worse with acute illness. VBG with some CO2 retention after missing CPAP but alertness improved during the day. B12 and TSH reassuring.   -Supportive care  - Odalis Barraza is roommate/caregiver/POA and helps provide history      Heart Failure with reduced ejection fraction, worsening  BNP elevated to 7,631. HF exacerbation could be contributing to her weakness. No increased dyspnea reported, lungs clear. Is not currently on SGLT2 given her history of UTIS.   -Continue PTA bumex 4mg daily  -Continue PTA Metoprolol succinate 25mg daily  -Continue PTA losartan 25mg daily  -Echo today showed severely reduced ejection fraction. Discussed with patient and family, will consult cardiology      CAD  CAD s/p  PCI x2 to LAD and CABG in 2018   -Continue PTA Aspirin  -lipitor 40mg daily    Diabetes on long-term insulin  -HOLD PTA lantus 32 units daily  -MSSI  -Hypoglycemic protocol      Hypothyroidism  -Continue PTA levothyroxine 150mcg    History of gout  - Rechecked uric acid per Odalis's request. Within normal range, no change to plan at this point.     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 She also enjoys watching birds, watching TV, and visiting with friends and family. Odalis has been her friend and roommate for many years and is her healthcare POA. Mariel's sisters both live out of state but they stay in touch by phone. Mariel enjoys being at home but is dependent for many cares.   Mariel has been full code in the past but she and Odalis are open to considering DNR given interval worsening of multiple health concerns. They would like to discuss this further.         Diet: 2 Gram Sodium Diet    DVT Prophylaxis: Pneumatic Compression Devices  Gutierrez Catheter: Not present  Lines: None     Cardiac Monitoring: None  Code Status:  "Full Code      Clinically Significant Risk Factors Present on Admission                # Drug Induced Platelet Defect: home medication list includes an antiplatelet medication   # Hypertension: Noted on problem list  # Chronic heart failure with reduced ejection fraction: last echo with EF <40%            # Severe Obesity: Estimated body mass index is 46.81 kg/m  as calculated from the following:    Height as of 7/10/24: 1.676 m (5' 6\").    Weight as of 7/10/24: 131.5 kg (290 lb).        # History of CABG: noted on surgical history       Disposition Plan     Medically Ready for Discharge: Anticipated in 2-4 Days           Jayy Mcginnis MD  Hospitalist Service, GOLD TEAM 21  [facility]  Securely message with [employer] (more info)  Text page via [employer] Paging/Directory   See signed in provider for up to date coverage information  ______________________________________________________________________    Interval History   No acute events overnight. Quite somnolent most of the night. Woke up better when Odalis came in the afternoon. Very slow to answer questions, unable to correctly answer location or year but did correctly name Odalis. Very tired, didn't work with PT. Does not endorse pain.   Odalis shared additional history of frequent UTIs, gradual worsening of memory and mobility concerns that are often exacerbated by acute illness.     Physical Exam   Vital Signs: Temp: 97.2  F (36.2  C) Temp src: Axillary BP: 113/79 Pulse: 88   Resp: 16 SpO2: 100 % O2 Device: Nasal cannula Oxygen Delivery: 4 LPM  Weight: 0 lbs 0 oz    Physical Exam  Vitals reviewed.   Constitutional:       Appearance: She is obese. She is ill-appearing.      Comments: Reclined in bed, sleepy but arouses to voice. In the afternoon she kept her eyes partially open and attended to conversation   HENT:      Head: Atraumatic.      Right Ear: External ear normal.      Left Ear: External ear normal.      Nose: " Nose normal.      Mouth/Throat:      Mouth: Mucous membranes are moist.   Eyes:      General: No scleral icterus.        Right eye: No discharge.         Left eye: No discharge.      Conjunctiva/sclera: Conjunctivae normal.   Cardiovascular:      Rate and Rhythm: Normal rate and regular rhythm.      Pulses: Normal pulses.   Pulmonary:      Breath sounds: No wheezing.      Comments: Breath sounds distant due to body habitus, unable to sit up for posterior exam. Mild use of abdominal muscles. On nasal cannula oxygen  Abdominal:      General: Bowel sounds are normal. There is no distension.      Palpations: Abdomen is soft.      Tenderness: There is no abdominal tenderness. There is no guarding.   Musculoskeletal:      Right lower leg: Edema present.      Left lower leg: Edema present.   Skin:     General: Skin is warm.      Capillary Refill: Capillary refill takes less than 2 seconds.   Neurological:      Comments: Sleepy but arouses to voice. Able to move all extremities in bed but with diffuse debility. Very slow to answer questions and sometimes trails off before attempting an answer.    Psychiatric:      Comments: Affect calm, withdrawn           Medical Decision Making       75 MINUTES SPENT BY ME on the date of service doing chart review, history, exam, documentation & further activities per the note.      Data     I have personally reviewed the following data over the past 24 hrs:    9.2  \   12.4   / 174     140 103 55.1 (H) /  111 (H)   4.4 24 1.57 (H) \     ALT: 18 AST: 23 AP: 120 TBILI: 0.4   ALB: 3.5 TOT PROTEIN: 6.5 LIPASE: N/A     Trop: 62 (H) BNP: 7,631 (H)     Procal: N/A CRP: N/A Lactic Acid: 0.9         Imaging results reviewed over the past 24 hrs:   Recent Results (from the past 24 hour(s))   Head CT w/o contrast    Narrative    EXAM: CT HEAD W/O CONTRAST  LOCATION: [facility]  DATE: 7/16/2024    INDICATION: Altered Mental Status  COMPARISON: Brain MRI  5/22/2024  TECHNIQUE: Routine CT Head without IV contrast. Multiplanar reformats. Dose reduction techniques were used.    FINDINGS:  INTRACRANIAL CONTENTS: No intracranial hemorrhage, extraaxial collection, or mass effect.  No CT evidence of acute infarct. Mild to moderate volume loss and mild burden presumed chronic small vessel ischemia are stable.    VISUALIZED ORBITS/SINUSES/MASTOIDS: No intraorbital abnormality. No paranasal sinus mucosal disease. No middle ear or mastoid effusion.    BONES/SOFT TISSUES: No acute abnormality.      Impression    IMPRESSION:  1.  No acute intracranial abnormality or significant change compared to the prior study.   CT Abdomen Pelvis w/o Contrast    Narrative    EXAM: CT ABDOMEN PELVIS W/O CONTRAST  LOCATION: [facility]  DATE: 7/16/2024    INDICATION: Altered mental status and abdominal pain.  COMPARISON: None available.  TECHNIQUE: CT scan of the abdomen and pelvis was performed without IV contrast. Multiplanar reformats were obtained. Dose reduction techniques were used.  CONTRAST: None.    FINDINGS:      Absence of intravenous contrast limits the sensitivity of this examination for detection of infectious/inflammatory change, post traumatic abnormalities, vascular abnormalities, and visceral lesions. Patient was imaged with arm(s) at side, limiting study   quality.    LOWER CHEST: Ovoid, partially calcified soft tissue density within the subcutaneous tissues of the inferomedial left chest, series 6 image 7, measuring 2.4 x 4.8 cm, possibly a sebaceous cyst and of doubtful clinical significance.    Mild cardiomegaly. Atherosclerotic calcification of coronary arteries and visualized thoracic aorta.    HEPATOBILIARY: Liver is normal in attenuation and size. Subxiphoid herniation contains a small portion of the anterior left hepatic lobe.      Cholecystectomy.    No intrahepatic or intrahepatic biliary ductal dilatation.    PANCREAS:  Normal attenuation. No peripancreatic inflammatory fat stranding.    SPLEEN: Normal attenuation. Normal size.    ADRENAL GLANDS: Normal.    KIDNEYS: No hydronephrosis.    No nephroureterolithiasis.    Mildly thick-walled, likely inflamed urinary bladder; correlation with urinalysis is recommended. Wall thickening is asymmetric towards the right.    PELVIC ORGANS: Hysterectomy.    BOWEL: No evidence of acute gastrointestinal inflammation or obstruction. Normal appendix.    No intraperitoneal free fluid or free air.    LYMPH NODES: No suspicious abdominal or pelvic lymphadenopathy.    VASCULATURE: No abdominal aortic aneurysm. Severe vascular calcifications.    MUSCULOSKELETAL: No suspicious abnormality.    OTHER: No additionally significant abnormalities.      Impression    IMPRESSION:     Asymmetric wall thickening of the urinary bladder, which is possibly inflamed; correlation with urinalysis is recommended. Given the asymmetric wall thickening, CT cystogram follow-up is recommended.   XR Chest 1 View    Narrative    EXAM: CHEST SINGLE VIEW PORTABLE  LOCATION: [facility]  DATE: 7/16/2024    INDICATION: Altered mental status. Congestive heart failure.  COMPARISON: 7/12/2022.    FINDINGS: Mildly increased interstitial opacities in both lungs. Unchanged cardiac silhouette. Atherosclerotic calcification in the thoracic aorta. Prior median sternotomy.      Impression    IMPRESSION:   1. Mildly increased interstitial opacity in both lungs. These are nonspecific and could relate to interstitial pulmonary edema or infection.  2. No other findings suspicious for active cardiopulmonary disease.    Echo Complete   Result Value    LVEF  25-30% (severely reduced)    Narrative    [id number]  [id number]  [id number]  [id number]^VENICE^[facility],[place]  Echocardiography Laboratory  [address] [place] [id number]     Name: ROSY PALMER  S  MRN: [id number]  : 1945  Study Date: 2024 10:56 AM  Age: 79 yrs  Gender: Female  Patient Location: [facility]  Reason For Study: TOWNSEND  Ordering Physician: RAI MILLARD  Performed By: Myrna Liang     BSA: 2.3 m2  Height: 66 in  Weight: 290 lb  HR: 77  BP: 155/66 mmHg  ______________________________________________________________________________  Procedure  Complete Portable Echo Adult. Contrast Optison. Echocardiogram with two-  dimensional, color and spectral Doppler performed. Optison (NDC #2979-4684-75)  given intravenously. Patient was given 6 ml mixture of 3 ml Optison and 6 ml  saline. 3 ml wasted.  ______________________________________________________________________________  Interpretation Summary  Severe left ventricular dilation is present. Left ventricular function is  decreased. The ejection fraction is 25-30% (severely reduced). Severe diffuse  hypokinesis is present.  Global right ventricular function is normal.  Mild aortic insufficiency is present.  Estimated mean right atrial pressure is elevated.  No pericardial effusion is present.  ______________________________________________________________________________  Left Ventricle  Severe left ventricular dilation is present. Left ventricular wall thickness  is normal. Left ventricular function is decreased. The ejection fraction is  25-30% (severely reduced). Severe diffuse hypokinesis is present. There is no  thrombus seen in the left ventricle.     Right Ventricle  The right ventricle is normal size. Global right ventricular function is  normal.     Atria  Both atria appear normal.     Mitral Valve  The mitral valve is normal. Trace mitral insufficiency is present.     Aortic Valve  Aortic valve sclerosis is present. Mild aortic insufficiency is present. No  aortic stenosis is present.     Tricuspid Valve  The tricuspid valve is normal. Trace to mild tricuspid insufficiency is  present. The peak velocity of the tricuspid  regurgitant jet is not obtainable.  Pulmonary artery systolic pressure cannot be assessed.     Pulmonic Valve  The pulmonic valve is normal.     Vessels  The aorta root is normal. Estimated mean right atrial pressure is elevated.     Pericardium  No pericardial effusion is present.     Compared to Previous Study  This study was compared with the study from  . LV function has declined.  ______________________________________________________________________________  MMode/2D Measurements & Calculations     IVSd: 0.88 cm  LVIDd: 6.3 cm  LVIDs: 5.7 cm  LVPWd: 1.1 cm  FS: 9.8 %  LV mass(C)d: 264.3 grams  LV mass(C)dI: 112.8 grams/m2  Ao root diam: 2.5 cm  asc Aorta Diam: 2.9 cm  LVOT diam: 2.0 cm  LVOT area: 3.1 cm2  Ao root diam index Ht(cm/m): 1.5  Ao root diam index BSA (cm/m2): 1.1  Asc Ao diam index BSA (cm/m2): 1.2  Asc Ao diam index Ht(cm/m): 1.7  EF Biplane: 29.8 %  LA Volume (BP): 87.1 ml     LA Volume Index (BP): 37.2 ml/m2  RWT: 0.34     Doppler Measurements & Calculations  MV E max alan: 120.0 cm/sec  MV A max alan: 87.0 cm/sec  MV E/A: 1.4  MV dec slope: 837.0 cm/sec2  MV dec time: 0.14 sec  Ao V2 max: 174.0 cm/sec  Ao max P.1 mmHg  Ao V2 mean: 126.0 cm/sec  Ao mean P.0 mmHg  Ao V2 VTI: 43.1 cm  COLIN(I,D): 1.7 cm2  COLIN(V,D): 1.8 cm2  LV V1 max PG: 3.8 mmHg  LV V1 max: 96.9 cm/sec  LV V1 VTI: 23.9 cm  SV(LVOT): 75.2 ml  SI(LVOT): 32.1 ml/m2  PA acc time: 0.13 sec  AV Alan Ratio (DI): 0.56  COLIN Index (cm2/m2): 0.74  E/E' av.1     Lateral E/e': 22.2  Medial E/e': 28.0  RV S Alan: 9.6 cm/sec     ______________________________________________________________________________  Report approved by: Aiden Vazquez 2024 12:58 PM

## 2024-07-17 NOTE — PHARMACY-ADMISSION MEDICATION HISTORY
Pharmacist Admission Medication History    Admission medication history is complete. The information provided in this note is only as accurate as the sources available at the time of the update.    Information Source(s): Caregiver via in-person    Pertinent Information:   - Medications reviewed with caregiver (Odalis) at bedside     Changes made to PTA medication list:  Added: None  Deleted: None  Changed:   APAP PRN -> 1000 mg BID + additional PRN   Diclofenac gel, nystatin ointment/powder, albuterol scheduled -> PRN     Allergies reviewed with patient and updates made in EHR: yes    Medication History Completed By: YA RAMIRES RPH 7/17/2024 5:07 PM    PTA Med List   Medication Sig Last Dose    acetaminophen (TYLENOL) 500 MG tablet Take 1,000 mg by mouth 2 times daily as needed for mild pain Past Week at PRN    acetaminophen (TYLENOL) 500 MG tablet Take 1,000 mg by mouth 2 times daily Past Week at AM    anastrozole (ARIMIDEX) 1 MG tablet Take 1 tablet (1 mg) by mouth daily Past Week at AM    aspirin (ASA) 81 MG EC tablet Take 1 tablet (81 mg) by mouth daily Past Week at AM    atorvastatin (LIPITOR) 40 MG tablet TAKE 1 TABLET(40 MG) BY MOUTH IN THE MORNING Past Week at AM    bumetanide (BUMEX) 1 MG tablet Take 4 tablets (4 mg) by mouth daily Past Week at AM    colchicine (COLCRYS) 0.6 MG tablet Take 1 tablet (0.6 mg) by mouth daily as needed for gout pain More than a month at PRN    diclofenac (VOLTAREN) 1 % topical gel Apply 2 g topically as needed for moderate pain To right wrist Past Month at PRN    febuxostat (ULORIC) 80 MG TABS tablet TAKE 1 TABLET(80 MG) BY MOUTH DAILY Past Week at AM    ferrous gluconate (FERGON) 324 (38 Fe) MG tablet TAKE 1 TABLET BY MOUTH EVERY MONDAY, WEDNESDAY, AND FRIDAY MORNING. Past Week at AM    insulin glargine (LANTUS SOLOSTAR) 100 UNIT/ML pen ADMINISTER 32 UNITS UNDER THE SKIN EVERY MORNING Past Week at AM    levothyroxine (SYNTHROID) 150 MCG tablet Take 1 tablet (150 mcg) by  mouth daily Past Week at AM    losartan (COZAAR) 25 MG tablet Take 1 tablet (25 mg) by mouth daily Past Week at AM    metoprolol succinate ER (TOPROL XL) 25 MG 24 hr tablet Take 1 tablet (25 mg) by mouth every evening Past Week at PM    miconazole (MICATIN/MICRO GUARD) 2 % external powder Apply topically as needed for itching or other Redness in bilateral groin and katharine clef Past Month at PRN    mirabegron (MYRBETRIQ) 25 MG 24 hr tablet Take 1 tablet (25 mg) by mouth daily Past Week at AM    nystatin (MYCOSTATIN) 966337 UNIT/GM external ointment Apply topically 2 times daily Apply to external vagina 2 times daily. (Patient taking differently: Apply topically as needed Apply to external vagina 2 times daily.) More than a month at PRN    nystatin POWD 1 Dose 4 times daily (Patient taking differently: Apply 1 Dose topically as needed) More than a month at PRN    potassium chloride malcolm ER (KLOR-CON M10) 10 MEQ CR tablet TAKE 2 TABLETS(20 MEQ) BY MOUTH TWICE DAILY Past Week at AM    pregabalin (LYRICA) 100 MG capsule TAKE 1 CAPSULE(100 MG) BY MOUTH TWICE DAILY Past Week at AM    PROAIR  (90 Base) MCG/ACT inhaler INHALE 2 PUFFS INTO THE LUNGS EVERY 6 HOURS (Patient taking differently: 1-2 puffs every 6 hours as needed for shortness of breath) More than a month at PRN    venlafaxine (EFFEXOR XR) 150 MG 24 hr capsule Take 1 capsule (150 mg) by mouth daily Past Week at AM

## 2024-07-18 NOTE — PLAN OF CARE
Goal Outcome Evaluation:      Plan of Care Reviewed With: caregiver          Outcome Evaluation: Patient and caregiver (friend) hopeful for discharge to home with home care when medically cleared.

## 2024-07-18 NOTE — CARE PLAN
VS: /57 (BP Location: Right leg)   Pulse 75   Temp 98.1  F (36.7  C) (Oral)   Resp 16   LMP  (LMP Unknown)   SpO2 100%      Output/Last BM: Incontinent of B/B, using purewick, last BM 7/16   Activity: 2x- not been OOB   Skin/Dressing: Excoriation perineum, barrier cream applied   Pain: Some pain with movement/turns, otherwise pt denies   CMS: Alert to self only   Diet: Regular/Thin, needs assistance with meals   LDA: DOMINIC Barajas PIV   Equipment: Lift, Bariatric commode   Plan: TBD   Additional Info:      Goal Outcome Evaluation:  Plan of Care Reviewed With: patient  Overall Patient Progress: No change

## 2024-07-18 NOTE — PROGRESS NOTES
Brief Cardiology Sign Off Note:    Reason for Consult:  A cardiology consult was requested by Dr. Mcginnis from the medicine service to provide clinical guidance regarding worsening functional status, worsening EF with CardioMems device in place.     Assessment and Recommendation:  Mariel Ribeiro is a 79 year old female with a past medical history of CAD s/p  PCI x2 to LAD and CABG in 2018 (LIMA-> LAD, SVG->OM2 and RPDA), DM2, HLD, CKD Stage III, COPD, longstanding HFpEF but now with mildly reduced ejection fraction (45-50%) s/p cardioMEM (goal PA diastolic 14-18) currently admitted 7/16 altered mental status 2/2 UTI. Cardiology was consulted for recs on worsening EF.      # Chronic Systolic Heart failure (EF previously 45-50%, now 20-25%)  # Elevated troponin, chronic 2/2 CKD  # CAD s/p CABG  # HTN  # HLD  Patient admitted with 3 days worsening weakness, confusion, found to have UTI. Echo completed due to [place] showing EF reduced 20-25%, when historically closer to 45-50%.  NT BNP 7631 (previously 363), troponin mildly elevated 62 (though chronically elevated, anywhere from ). Chest xray with mild pulmonary edema. Patient likely has a component of stress cardiomyopathy superimposed on her ICM and moderately reduced ejection fraction. As acute illness improves, we would expect her EF to recover similar to her previous baseline (45%).     - Volume Status: euvolemic, continue PTA Bumex 4 mg daily  - BB: Continue PTA Metoprolol 25 mg daily (dose recently reduced)  - ARB: Continue PTA Losartan 25 mg daily  - SGLT2i: not taking prior due to hx of UTI  - Continue PTA ASA, Lipitor  - Primary HF MD already notified of EF drop, no changes to medications given acute illness  - Per chart review, patient with limited mobility prior to hospitalization, unlikely worsened EF is cause of weakness    Recommendations:  - Once acute illness has resolved, would recommend repeat TTE prior to discharge. Patient and POA both state  she has difficulty with transportation and would struggle to return to clinic for update TTE soon.   - If repeat TTE does not show EF recovered back to her baseline, please reach out to cardiology team for further evaluation of reduced ejection fraction (I.e. ischemic evaluation)    Discussed with staff cardiologist, Dr. Restrepo, who agrees with above plan. We will sign off at this time, thank you for involving our team in the care of this patient.

## 2024-07-18 NOTE — PROGRESS NOTES
07/18/24 1099   Appointment Info   Signing Clinician's Name / Credentials (PT) Emma Westphalen, SPT   Student Supervision On-site supervision provided;Direct supervision provided;Line of sight supervision provided;Direct Patient Contact Provided   Living Environment   People in Home friend(s)   Current Living Arrangements house   Home Accessibility stairs to enter home   Number of Stairs, Main Entrance 3   Stair Railings, Main Entrance railings safe and in good condition;railings on both sides of stairs   Transportation Anticipated family or friend will provide   Living Environment Comments Pt lives in one level home with 3 stairs to enter with caregiver who assists with all ADLs and self-care   Self-Care   Usual Activity Tolerance fair   Current Activity Tolerance poor   Regular Exercise No   Equipment Currently Used at Home cane, straight;walker, rolling;commode chair;wheelchair, manual   Fall history within last six months yes   Number of times patient has fallen within last six months   (Caregiver reports that pt has frequent falls)   Activity/Exercise/Self-Care Comment Caregiver typically helps pt with tranfers to commode, cooking, cleaning and other ADLs; pt typically able to assist with transfers and short distance ambulation   General Information   Onset of Illness/Injury or Date of Surgery 07/16/24   Referring Physician Louisa Lancaster MD   Patient/Family Therapy Goals Statement (PT) did not endorse   Pertinent History of Current Problem (include personal factors and/or comorbidities that impact the POC) Mariel Ribeiro is a 79 year old female past medical history significant for morbid obesity, COPD, migraines, Hfpef, history of CABG x 3, history of type 2 diabetes with stage III CKD, recurrent falls who presented with concerns for generalized weakness. admitted on 7/16/2024 for altered mental status possibly secondary to acute cystitis. Also found to have worsening heart failure now with severely  reduced ejection fraction.   Existing Precautions/Restrictions no known precautions/restrictions   Weight-Bearing Status - LUE full weight-bearing   Weight-Bearing Status - RUE full weight-bearing   Weight-Bearing Status - LLE full weight-bearing   Weight-Bearing Status - RLE full weight-bearing   General Observations Pt supine in bed upon arrival; Attempted to ask pt subjective questions about current living environment/ current status, but pt unable to answer questions; caregiver/roomate answered questions.   Cognition   Affect/Mental Status (Cognition) confused;unable/difficult to assess   Cognitive Status Comments Pt is awake/alert but is unresponsive to questions   Pain Assessment   Patient Currently in Pain Yes, see Vital Sign flowsheet   Integumentary/Edema   Integumentary/Edema no deficits were identifed   Posture    Posture Comments unable to assess; pt remained supine in bed   Range of Motion (ROM)   ROM Comment unable to assess pt remained supine in bed   Strength (Manual Muscle Testing)   Strength Comments Pt appears to have weakness in UEs and LEs; unable to complete any OOB activity at this time   Bed Mobility   Comment, (Bed Mobility) Attempted supine>sit with max Ax2 and patient resisted movement   Transfers   Comment, (Transfers) unable to assess at this time; Caregiver states that pt typically is able to perform transfers from chair to/from commode at home   Gait/Stairs (Locomotion)   Comment, (Gait/Stairs) Pt is unable to complete any ambulation/stairs at this time   Balance   Balance Comments unable to assess; pt unable to get EOB   Sensory Examination   Sensory Perception Comments unable to assess   Coordination   Coordination Comments unable to assess   Muscle Tone   Muscle Tone Comments unable to assess   Clinical Impression   Criteria for Skilled Therapeutic Intervention Yes, treatment indicated   PT Diagnosis (PT) decreased functional mobility   Influenced by the following impairments  altered mental status, weakness, decreased activity tolerance, decreased standing balance, falls history   Functional limitations due to impairments bed mobility, transfers, gait, stairs   Clinical Presentation (PT Evaluation Complexity) stable   Clinical Presentation Rationale per clinical judgement   Clinical Decision Making (Complexity) low complexity   Planned Therapy Interventions (PT) balance training;bed mobility training;gait training;groups;home exercise program;manual therapy techniques;motor coordination training;neuromuscular re-education;patient/family education;ROM (range of motion);stair training;strengthening;stretching;transfer training;wheelchair management/propulsion training;progressive activity/exercise;risk factor education;home program guidelines;other (see comments)   Risk & Benefits of therapy have been explained evaluation/treatment results reviewed;care plan/treatment goals reviewed   Clinical Impression Comments Pt is unable to complete any OOB activity at this time; attempted supine > sit with max Ax2 and patiently actively resisted movement.   PT Total Evaluation Time   PT Eval, Low Complexity Minutes (90662) 10   Physical Therapy Goals   PT Frequency Daily   PT Predicted Duration/Target Date for Goal Attainment 07/25/24   PT Goals Bed Mobility;Transfers;Stairs;Gait   PT: Bed Mobility Minimal assist   PT: Transfers Minimal assist   PT: Gait 100 feet;Minimal assist;Standard walker   PT: Stairs 3 stairs;Rail on both sides;Minimal assist   Therapeutic Activity   Therapeutic Activities: dynamic activities to improve functional performance Minutes (51277) 1   Treatment Detail/Skilled Intervention PT: Attempted to sit EOB with maxA of 2, patient resisting   PT Discharge Planning   PT Plan sitting tolerance, transfers, initiate ambulation   PT Discharge Recommendation (DC Rec) Transitional Care Facility   PT Rationale for DC Rec Pt is currently confused and has limited OOB mobility; caregiver  states that pt has gone to TCU after UTIs in the past to improve  mobility before discharging home; Pt's caregiver assists with all ADLs/mobility normally, but would not be able to provide safe assist for patient at this time   PT Brief overview of current status unable to complete any OOB actiivity with max Ax2   Total Session Time   Timed Code Treatment Minutes 1   Total Session Time (sum of timed and untimed services) 11

## 2024-07-18 NOTE — CONSULTS
Care Management Initial Consult    General Information  Assessment completed with: Friend, Other (Roomate)Odalis  Type of CM/SW Visit: Offer D/C Planning    Primary Care Provider verified and updated as needed: Yes   Readmission within the last 30 days: no previous admission in last 30 days         Advance Care Planning: Advance Care Planning Reviewed: present on chart          Communication Assessment  Patient's communication style: spoken language (English or Bilingual)    Hearing Difficulty or Deaf: no   Wear Glasses or Blind: yes    Cognitive  Cognitive/Neuro/Behavioral: .WDL except, orientation  Level of Consciousness: lethargic, confused  Arousal Level: opens eyes spontaneously  Orientation: disoriented to, place, time, situation  Mood/Behavior: calm, cooperative     Speech: garbled    Living Environment:   People in home: friend(s)  Odalis Lowry  Current living Arrangements: house      Able to return to prior arrangements: other (see comments)  Living Arrangement Comments: Unclear at this time if patient is able to return home or will need short term rehab first.    Family/Social Support:  Care provided by: friend, other (see comments) (uses private pay agencies for help)  Provides care for: no one, unable/limited ability to care for self  Marital Status: Single  Friend          Description of Support System: Supportive, Involved    Support Assessment: Adequate family and caregiver support, Adequate social supports    Current Resources:   Patient receiving home care services: Yes  Skilled Home Care Services: Skilled Nursing, Home Health Aid  Community Resources: [facility]  Equipment currently used at home: cane, straight, walker, rolling, commode chair  Supplies currently used at home: Incontinence Supplies, Diabetic Supplies, Gloves, Wipes, Chux    Employment/Financial:  Employment Status: retired        Financial Concerns: none   Referral to Financial Worker: No       Does the patient's insurance  normal S1, S2 heard plan have a 3 day qualifying hospital stay waiver?  Yes     Which insurance plan 3 day waiver is available? Alternative insurance waiver    Will the waiver be used for post-acute placement? Undetermined at this time    Lifestyle & Psychosocial Needs:  Social Determinants of Health     Food Insecurity: Low Risk  (1/8/2024)    Food Insecurity     Within the past 12 months, did you worry that your food would run out before you got money to buy more?: No     Within the past 12 months, did the food you bought just not last and you didn t have money to get more?: No   Depression: At risk (6/10/2024)    PHQ-2     PHQ-2 Score: 4   Housing Stability: Low Risk  (1/8/2024)    Housing Stability     Do you have housing? : Yes     Are you worried about losing your housing?: No   Tobacco Use: Medium Risk (7/10/2024)    Patient History     Smoking Tobacco Use: Former     Smokeless Tobacco Use: Never     Passive Exposure: Not on file   Financial Resource Strain: Low Risk  (1/8/2024)    Financial Resource Strain     Within the past 12 months, have you or your family members you live with been unable to get utilities (heat, electricity) when it was really needed?: No   Alcohol Use: Not on file   Transportation Needs: High Risk (1/8/2024)    Transportation Needs     Within the past 12 months, has lack of transportation kept you from medical appointments, getting your medicines, non-medical meetings or appointments, work, or from getting things that you need?: Yes   Physical Activity: Not on file   Interpersonal Safety: Not on file   Stress: Not on file   Social Connections: Not on file   Health Literacy: Not on file       Functional Status:  Prior to admission patient needed assistance:   Dependent ADLs:: Ambulation-walker, Bathing, Dressing, Grooming, Incontinence, Wheelchair-with assist, Transfers, Toileting  Dependent IADLs:: Cleaning, Cooking, Laundry, Shopping, Meal Preparation, Medication Management, Money Management,  Transportation  Assesssment of Functional Status: Not at baseline with mobility    Mental Health Status:  Mental Health Status: Current Concern  Mental Health Management:  (some cognitive decline)    Chemical Dependency Status:  Chemical Dependency Status: No Current Concerns             Values/Beliefs:  Spiritual, Cultural Beliefs, Christianity Practices, Values that affect care: no  Description of Beliefs that Will Affect Care: Moravian            Additional Information:  Per chart review:  Mariel Ribeiro is a 79 year old female past medical history significant for morbid obesity, COPD, migraines, history of CABG x 3, history of type 2 diabetes with stage III CKD, recurrent falls who presents to the emergency department with her roommate with concerns for generalized weakness.     Patient's roommate is the primary historian.  She notes that over the last 3 days patient has become generally weak and is unable to get herself out of bed.  She typically is able to ambulate about 10 to 15 feet with a walker and now is unable to get out of bed.  The roommate notes patient is also may be had a bit more confusion at her baseline.  No falls or head injury.  Patient has had several episodes where she has slipped out of bed however has not fallen to the ground.  No associated fevers, new cough, patient is not had any report of chest pain or shortness of breath.  No vomiting diarrhea or constipation patient is incontinent at baseline does have a history of UTI.  Patient also is developing some skin breakdown to her bilateral groin which is being dressed with nystatin and barrier cream.  Patient's roommate is unable to care for Ms. Ribeiro in her current state.    This RNCC met with patients roommate, Odalis who is also her POA at the bedside to introduce role of RNCC and complete initial assessment. Odalis states that she has know patient for about 38 years and they have lived together for about 30 years. Odalis states patient  "went on disability around 2001 for for fibromyalgia;Odalis is still working as a  but works from home, Odalis states that they private pay for some services through [facility], a HHA comes out to help with bathing/showering however it has become unsafe for patient to shower in the tub as she has had two episodes of near fainting secondary to vasodilation from the warm shower. From time to time they need to do RN visits for lab draws as patient has cardiac issues, patient has a MEMS unit which records venous pressure and readings are sent directly to the clinic. Odalis states that lately have issues with incontinence as patient now on Bumex, inquired about any recommendations to decrease incontinence. Discussed getting patient up regularly for toileting also discussed patient maybe a candidate for George lift to assist caregiver. Odalis had many current and future concerns regarding living accommodations.  Would welcome resources for facilities that had independent living/CASPER and LTC all on one campus, informed will email her resources on  [facility] and Senior Linkage Line. Patient does have agency \"[employer]\" that comes in 3 hours on Tuesdays and 4 hours on Fridays to do housekeeping. Odalis states that she would be able to bring patient home if she is just an assist on one, if not she would need TCU for short term until she is able to transfer and pivot to wheel chair and get into a vehicle with only 1 person to help. Patient has [facility] in the past bit feels they would not be aggressive enough with therapy.     CHW e mailed resources: [facility] and Senior Linkage line    Current Equipment in home:  Bed with rails  Recliner (ejection type)  Sturdy bariatric commode  Walker, Cane  Transport Wheelchair  Currently putting in ramps in front of home.    Will ask PT to assess for george lift      Monica SCOTTN RN [facility]  5MS [phone]  Nurse Coordinator  Securely message with Sierra, 5 " Med Surg RNCC

## 2024-07-18 NOTE — PLAN OF CARE
Occupational Therapy: Orders received. Chart reviewed and discussed with care team.? Occupational Therapy not indicated due to per discussion with pt and pt's roommate, pt has cognitive deficits that have been getting progressively worse and is not a new concern. Pt has been receiving assist with all I/ADLs at baseline from home care providers or roommate with no IP OT needs at this time.? Defer discharge recommendations to PT.? Will complete orders.

## 2024-07-18 NOTE — PROGRESS NOTES
7/18/24  Care Management Follow Up    CHW provided pt with the senior linkage line link and information to [facility] via email ([email or web address] ). This was requested per the RNCC covering the unit.    Sarah Dial  Inpatient CHW  [facility] 5 Ortho, 8 Med Surge, & ED  PH: [phone]

## 2024-07-18 NOTE — PROGRESS NOTES
VS: BP (!) 140/70   Pulse 73   Temp 99.1  F (37.3  C) (Oral)   Resp 16   LMP  (LMP Unknown)   SpO2 100%         Output/Last BM: Incontinent of B/B, purewick in place by the AM shift 7/17/24. Pt roommate/caregiver states last BM was approx 1-2 days ago   Activity: 2x - not OOB   Skin/Dressing: Pt's ralph area is sore and has blisters barrier cream applied.    Pain: Denies pain   CMS: Oriented to self, knows birthday. Unable to answer other questions.   Diet: 2 Gram Sodium, Thin liquids   LDA: L PIV SL   Equipment:     Plan:     Additional Info: Pt was able to get her CPAP going yesterday and she slept well.

## 2024-07-18 NOTE — PROGRESS NOTES
[facility]    Medicine Progress Note - Hospitalist Service, GOLD TEAM 21    Date of Admission:  7/16/2024    Assessment & Plan   Mariel Ribeiro is a 79 year old female past medical history significant for morbid obesity, COPD, migraines, Hfpef, history of CABG x 3, history of type 2 diabetes with stage III CKD, recurrent falls who presented with concerns for generalized weakness. admitted on 7/16/2024 for altered mental status possibly secondary to acute cystitis. Also found to have worsening heart failure now with severely reduced ejection fraction.     Today:   - Urine culture with >100,000 group B strep, 10-50,000 Pseudomonas. Clinically improving on ceftriaxone, unclear whether pseudomonas is pathogenic in this case. Repeat urinalysis with reflex to culture ordered. If clinically worsening, would add empiric pseudomonal coverage.   - Appreciate cardiology consultation. Recommend repeat echocardiogram prior to discharge  - Appreciate PT and care management assistance with next steps  - Patient does not actually use CPAP at home because she frequently takes it off. Trial without CPAP and check VBG tomorrow morning.     #Acute cystitis  #Group B strep >100,000, 10-50,000 pseudomonas in urine  #Functional and urge urinary incontinence  Urine infectious with elevated WBC and leuk esterase. CT showed thickened bladder wall consistent with acute cystitis. Patient has been afebrile, no leukocytosis, no sign pyelonephritis on imaging. History of UTIs. Recently started on mirabegron in early July.  Holding mirabegron now as it can cause urinary retention and contribute to UTIs. Urine culture with predominantly group B strep but some pseudomonas. Unclear whether pseudomonas is pathologic in this case given lower counts.   -Continue ceftriaxone daily. Would add pseudomonas coverage if there is clinical sepsis or worsening of infectious symptoms.   -Pending blood culture no  growth to date  -Repeat urinalysis with culture    #Acute metabolic encephalopathy due to infection with underlying cognitive impairment  #Long history of multifactorial cognitive impairment (last neurology note Nov 2022)  Head CT negative for bleed or acute pathology. Long history of mild difficulty with memory and work-finding that is often worse with acute illness. VBG with some CO2 retention after missing CPAP but alertness improved during the day. B12 and TSH reassuring.   -Supportive care  - Odalis Barraza is roommate/caregiver/POA and helps provide history    Heart Failure with reduced ejection fraction, worsening  BNP elevated to 7,631. HF exacerbation could be contributing to her weakness. No increased dyspnea reported, lungs clear. Is not currently on SGLT2 given her history of UTIS.   -Continue PTA bumex 4mg daily  -Continue PTA Metoprolol succinate 25mg daily  -Continue PTA losartan 25mg daily  -Echo 7/17 showed severely reduced ejection fraction. Cardiology consulted. Recommended to repeat echocardiography prior to discharge and contact cardiology if the ejection fraction does not return to prior baseline.     Acute respiratory failure with hypercapnea and hypoxia, improving  Initially somnolent with blood gas consistent with CO2 retention. She has sleep apnea and is supposed to use CPAP at home but never uses it due to intolerance and taking off the mask.   - No CPAP tonight and check VBG in the morning  - Repeat blood gas if excessive somnolence occurs.   - Wean oxygen as tolerated    CAD  CAD s/p  PCI x2 to LAD and CABG in 2018   -Continue PTA Aspirin  -lipitor 40mg daily    Diabetes on long-term insulin  -HOLD PTA lantus 32 units daily  -MSSI  -Hypoglycemic protocol    Hypothyroidism  -Continue PTA levothyroxine 150mcg    History of gout  - Rechecked uric acid per Odalis's request. Within normal range, no change to plan at this point.     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 She also enjoys watching birds, watching TV, and visiting with friends and family. Odalis has been her friend and roommate for many years and is her healthcare POA. Mariel's sisters both live out of state but they stay in touch by phone. Mariel enjoys being at home but is dependent for many cares.   Mariel has been full code in the past but she and Odalis are open to considering DNR given interval worsening of multiple health concerns. They would like to discuss this further.           Diet: 2 Gram Sodium Diet    DVT Prophylaxis: Pneumatic Compression Devices  Gutierrez Catheter: Not present  Lines: None     Cardiac Monitoring: None  Code Status: Full Code      Clinically Significant Risk Factors                  # Hypertension: Noted on problem list  # Chronic heart failure with reduced ejection fraction: last echo with EF <40%             # Severe Obesity: Estimated body mass index is 46.81 kg/m  as calculated from the following:    Height as of 7/10/24: 1.676 m (5' 6\").    Weight as of 7/10/24: 131.5 kg (290 lb)., PRESENT ON ADMISSION     # Financial/Environmental Concerns: none   # History of CABG: noted on surgical history       Disposition Plan     Medically Ready for Discharge: Anticipated in 2-4 Days             Jayy Mcginnis MD  Hospitalist Service, [facility] 21  [facility]  Securely message with [employer] (more info)  Text page via [facility] Paging/Directory   See signed in provider for up to date coverage information  ______________________________________________________________________    Interval History   No acute events. Slept with CPAP. Appears comfortable. Endorses some diffuse aching pain of extremities but nothing focal.   Odalis shared that Mariel does not actually use her CPAP at home because she always takes off the mask and doesn't tolerate it well.     Physical Exam   Vital Signs: Temp: 98.2  F (36.8  C) Temp src: " Axillary BP: 133/57 Pulse: 71   Resp: 16   O2 Device: BiPAP/CPAP Oxygen Delivery: 4 LPM  Weight: 0 lbs 0 oz    Physical Exam  Vitals reviewed.   Constitutional:       Comments: Reclined in bed, sleepy but arouses to voice, appears to have chronic illness   HENT:      Head: Atraumatic.      Right Ear: External ear normal.      Left Ear: External ear normal.      Nose: Nose normal.      Mouth/Throat:      Mouth: Mucous membranes are moist.   Eyes:      General:         Right eye: No discharge.         Left eye: No discharge.      Conjunctiva/sclera: Conjunctivae normal.   Cardiovascular:      Rate and Rhythm: Normal rate and regular rhythm.      Pulses: Normal pulses.   Pulmonary:      Breath sounds: No wheezing.      Comments: Breath sounds distant due to body habitus  Abdominal:      General: There is no distension.      Palpations: Abdomen is soft.      Tenderness: There is no abdominal tenderness. There is no guarding.   Musculoskeletal:         General: No tenderness.      Cervical back: Neck supple.      Comments: Large extremities appear swollen but no pitting edema   Neurological:      Comments: Diffuse generalized weakness. Able to move all extremities in bed.    Psychiatric:      Comments: Affect calm, withdrawn, answering few questions           Medical Decision Making       55 MINUTES SPENT BY ME on the date of service doing chart review, history, exam, documentation & further activities per the note.      Data     I have personally reviewed the following data over the past 24 hrs:    7.3  \   11.8   / 173     142 104 52.1 (H) /  152 (H)   3.7 28 1.41 (H) \       Imaging results reviewed over the past 24 hrs:   No results found for this or any previous visit (from the past 24 hour(s)).

## 2024-07-19 NOTE — PROGRESS NOTES
VS: /56 (BP Location: Left arm)   Pulse 61   Temp 97.8  F (36.6  C) (Oral)   Resp 18   LMP  (LMP Unknown)   SpO2 99%     O2: Stable ORA >90%   Output: Purewick in place and patent    Last BM: No BM this shift    Activity: Bedside attendant    Up for meals? Yes, ate 100 % of meals   Skin: Redness on Perineum   Pain: LINDA   CMS: AO x1, self only   Dressing: None   Diet: 2g Sodium   LDA: Rt PIV SL   Equipment: IV pole, bedside commode   Plan: Continue with POC   Additional Info: Pt has US pending for post void and needs to be done on command. US and urology will revisit on Monday.   Had PT session   A1c test done, result 7.6, now on BG checks and insulin

## 2024-07-19 NOTE — PLAN OF CARE
"Goal Outcome Evaluation:      Plan of Care Reviewed With: patient    Overall Patient Progress: no change    Patient alert to self. Upon shift start, writer walked into pt's room and saw purewick and IV on the ground. Writer replaced purewick and pt pulled it out again. Pt attempted to get out of bed unassisted. Writer paged provider for 1:1 bedside attendant for safety, confusion, line pulling. Incontinent of bowel and bladder, purewick in place. Assist x2-3 with repositioning and cares. During incontinence cares, pt hitting and grabbing at staff, attempting to get out of bed. New order for cefepime q24h. New IV placed in right forearm, infusing TKO, covered with \"no no\" brace. Pt refusing continuous pulse ox, continues to keep taking off pulse ox after replacing back on finger. Refused CPAP machine. Regular diet, thin liquids, pills with applesauce. Repositioned as tolerated, pt refused x2 this shift, hitting and grabbing at staff while attempting to reposition, pt told staff \"leave me alone, stop you are trying to hurt me\" Pt educated staff are trying to help pt, prevent pressure injuries/ skin breakdown. Pt calm resting in bed comfortably. Call light within reach. Continue with current plan of care.   "

## 2024-07-19 NOTE — PROGRESS NOTES
[facility]    Medicine Progress Note - Hospitalist Service, GOLD TEAM 21    Date of Admission:  7/16/2024    Assessment & Plan   Mariel Ribeiro is a 79 year old female past medical history significant for morbid obesity, COPD, migraines, Hfpef, history of CABG x 3, history of type 2 diabetes with stage III CKD, recurrent falls who presented with concerns for generalized weakness. admitted on 7/16/2024 for altered mental status possibly secondary to acute cystitis. Also found to have worsening heart failure now with severely reduced ejection fraction.     Today:   - Due to persistent pyuria despite ceftriaxone and pseudomonas identified in urine culture, escalated antibiotic to cefepime yesterday evening. The repeat culture pending was from before cefepime was started, so both can be used to guide and narrow antibiotic treatment as results become available.   - Confusion last night prompted initiation of sitter. Mentation today is similar to yesterday. Doubt true cefepime neurotoxicity but will continue to monitor.   - Discussed with ultrasound technician the request from urology for post-void ultrasound; this is not technically feasible unless she can void on command which she currently can't but usually can. Plan to re-attempt on Monday or when able to void on command.   - Ongoing care coordination and work with PT.     #Acute cystitis  #Group B strep >100,000, 10-50,000 pseudomonas in urine  #Functional and urge urinary incontinence  #Recurrent urinary tract infections  Urine infectious with elevated WBC and leuk esterase. CT showed thickened bladder wall consistent with acute cystitis. Patient has been afebrile, no leukocytosis, no sign pyelonephritis on imaging. History of UTIs. Recently started on mirabegron in early July.  Holding mirabegron now as it can cause urinary retention and contribute to UTIs. Urine culture with predominantly group B strep but some  pseudomonas. Repeat urinalysis remained with pyuria and repeat culture pending.   -Antibiotic escalated to cefepime due to pseudomonas. Await further sensitivities and follow-up culture result.   -Pending blood culture no growth to date  -Narrow antibiotics based on culture and sensitivity results.   - Appreciate urology consultation. Post-void bladder ultrasound desired but this can technically be completed when the patient's time of void can be communicated and known. Due to cognitive limitations today, will plan to re-attempt the study on Monday pending ability to void on command.   - Consider cranberry, vitamin C, and/or methenamine after completion of acute UTI treatment. Outpatient urology follow-up recommended.     #Acute metabolic encephalopathy due to infection with underlying cognitive impairment  #Long history of multifactorial cognitive impairment (last neurology note Nov 2022)  Head CT negative for bleed or acute pathology. Long history of mild difficulty with memory and work-finding that is often worse with acute illness. VBG with some CO2 retention after missing CPAP but alertness improved during the day. B12 and TSH reassuring.   -Supportive care. Okay to wean  if she is safe.   - Odalis Barraza is roommate/caregiver/POA and helps provide history    Heart Failure with reduced ejection fraction, worsening  BNP elevated to 7,631. HF exacerbation could be contributing to her weakness. No increased dyspnea reported, lungs clear. Is not currently on SGLT2 given her history of UTIS.   -Continue PTA bumex 4mg daily  -Continue PTA Metoprolol succinate 25mg daily  -Continue PTA losartan 25mg daily  -Echo 7/17 showed severely reduced ejection fraction. Cardiology consulted. Recommended to repeat echocardiography prior to discharge and contact cardiology if the ejection fraction does not return to prior baseline.     Acute respiratory failure with hypercapnea and hypoxia, improving  Initially  "somnolent with blood gas consistent with CO2 retention. She has sleep apnea and is supposed to use CPAP at home but never uses it due to intolerance and taking off the mask.   - VBG after night without CPAP was acceptable. Defer CPAP use since it is not part of her home regimen.   - Repeat blood gas if excessive somnolence occurs.   - Wean oxygen as tolerated    CAD  CAD s/p  PCI x2 to LAD and CABG in 2018   -Continue PTA Aspirin  -lipitor 40mg daily    Diabetes on long-term insulin  -HOLD PTA lantus 32 units daily  -MSSI  -Hypoglycemic protocol    Hypothyroidism  -Continue PTA levothyroxine 150mcg    History of gout  - Rechecked uric acid per Odalis's request. Within normal range, no change to plan at this point.     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 She also enjoys watching birds, watching TV, and visiting with friends and family. Odalis has been her friend and roommate for many years and is her healthcare POA. Mariel's sisters both live out of state but they stay in touch by phone. Mariel enjoys being at home but is dependent for many cares.   Mariel has been full code in the past but she and Odalis are open to considering DNR given interval worsening of multiple health concerns. They would like to discuss this further in the future.           Diet: 2 Gram Sodium Diet    DVT Prophylaxis: Pneumatic Compression Devices  Gutierrez Catheter: Not present  Lines: None     Cardiac Monitoring: None  Code Status: Full Code      Clinically Significant Risk Factors                  # Hypertension: Noted on problem list  # Chronic heart failure with reduced ejection fraction: last echo with EF <40%             # Severe Obesity: Estimated body mass index is 46.81 kg/m  as calculated from the following:    Height as of 7/10/24: 1.676 m (5' 6\").    Weight as of 7/10/24: 131.5 kg (290 lb)., PRESENT ON ADMISSION     # Financial/Environmental Concerns: none   # History " of CABG: noted on surgical history       Disposition Plan     Medically Ready for Discharge: Anticipated in 2-4 Days             Jayy Mcginnis MD  Hospitalist Service, GOLD TEAM 21  [facility]  Securely message with [employer] (more info)  Text page via [employer] Paging/Directory   See signed in provider for up to date coverage information  ______________________________________________________________________    Interval History   Confusion overnight prompted a sitter. Today her mentation is similar to yesterday. Odalis notes that Mariel is often more anxious at night and seemed that way even before she got the cefepime. Mariel was able to sit up with physical therapy today, motivated by ice cream. She does not endorse any focal pain or other symptoms.   Odalis expresses understanding of situation, agreement with treatment plan.     Physical Exam   Vital Signs: Temp: 97.8  F (36.6  C) Temp src: Oral BP: 135/56 Pulse: 61   Resp: 18 SpO2: 99 % O2 Device: None (Room air) Oxygen Delivery: 3 LPM  Weight: 0 lbs 0 oz    Physical Exam  Vitals reviewed.   Constitutional:       Appearance: She is obese.      Comments: Reclined in bed, appears ill   HENT:      Head: Atraumatic.      Right Ear: External ear normal.      Left Ear: External ear normal.      Nose: Nose normal.      Mouth/Throat:      Mouth: Mucous membranes are moist.   Eyes:      General:         Right eye: No discharge.         Left eye: No discharge.      Conjunctiva/sclera: Conjunctivae normal.   Cardiovascular:      Rate and Rhythm: Normal rate and regular rhythm.      Pulses: Normal pulses.   Pulmonary:      Effort: Pulmonary effort is normal. No respiratory distress.      Breath sounds: No wheezing.      Comments: Comfortable on room air today (had nasal cannula previously)  Abdominal:      General: Bowel sounds are normal. There is no distension.      Palpations: Abdomen is soft.      Tenderness: There is no  abdominal tenderness. There is no guarding.   Musculoskeletal:      Cervical back: Neck supple.      Comments: Bilateral edema but only trace pitting around ankles   Skin:     General: Skin is warm.      Capillary Refill: Capillary refill takes less than 2 seconds.   Neurological:      Comments: Sleepy. Makes eye contact for a while, stays awake for some conversation, answers questions very slowly, only a few words at a time   Psychiatric:      Comments: Appears calm, sleepy         Medical Decision Making       60 MINUTES SPENT BY ME on the date of service doing chart review, history, exam, documentation & further activities per the note.      Data     I have personally reviewed the following data over the past 24 hrs:    N/A  \   N/A   / N/A     139 102 48.7 (H) /  217 (H)   3.5 26 1.31 (H) \       Imaging results reviewed over the past 24 hrs:   No results found for this or any previous visit (from the past 24 hour(s)).
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
## 2024-07-19 NOTE — PROGRESS NOTES
Brief medicine cross cover note  Was paged by nursing at 2100 that pt was confused, pulled out her IV and pure wick, and was attempting to get out of bed.  Nursing requesting one-to-one bedside attendant.  Noted that IV antibiotics already broadened by day team to include Pseudomonas coverage.   -Ordered one-to-one jeff Laguna PA-C  Hospitalist Service, [facility]  Securely message with [employer] (more info)  Text page via [facility] Paging/Directory   See signed in provider for up to date coverage information

## 2024-07-19 NOTE — PROGRESS NOTES
Care Management Follow Up    Length of Stay (days): 3    Expected Discharge Date: 07/20/2024     Concerns to be Addressed: discharge planning     Patient plan of care discussed at interdisciplinary rounds: Yes    Anticipated Discharge Disposition: TCU   Anticipated Discharge Services:  rehab  Anticipated Discharge DME: Other (see comment) ([facility] may be of benefit to patient/caregiver)    Patient/family educated on Medicare website which has current facility and service quality ratings:    Education Provided on the Discharge Plan: Yes  Patient/Family in Agreement with the Plan: yes    Referrals Placed by CM/SW: external when appropriate   Private pay costs discussed: Not applicable    Additional Information:  This RNCC spoke with Odalis TORREZ and roommate, left list of TCU's at her bedside, she will review when she arrives at the hospital today. When in patient room noted she is on a 1:1, will not be able to send TCU referrals until patient is off 1:1.    Monica SCOTTN RN [facility]  5MS [phone]  Nurse Coordinator  Securely message with [facility] RNCC

## 2024-07-20 NOTE — PROGRESS NOTES
VS: BP (!) 154/73 (BP Location: Left arm)   Pulse 80   Temp 98.1  F (36.7  C) (Oral)   Resp 18   LMP  (LMP Unknown)   SpO2 96%      O2: Stable on room ar >90%   Output: Purewick in place and patent   Last BM: No BM this shift    Activity: Bedside attendant   Up for meals? Yes   Skin: Redness on perineum buttocks    Pain: LINDA   CMS: Alert to self   Dressing: None   Diet: 2g Na diet   LDA: Rt PIV TKO   Equipment: IV pole    Plan: Continue with POC   Additional Info:

## 2024-07-20 NOTE — PROGRESS NOTES
[facility]    Medicine Progress Note - Hospitalist Service, GOLD TEAM 21    Date of Admission:  7/16/2024    Assessment & Plan   Mariel Ribeiro is a 79 year old female past medical history significant for morbid obesity, COPD, migraines, Hfpef, history of CABG x 3, history of type 2 diabetes with stage III CKD, recurrent falls who presented with concerns for generalized weakness. admitted on 7/16/2024 for altered mental status possibly secondary to acute cystitis. Also found to have worsening heart failure now with severely reduced ejection fraction.     #Acute cystitis  #Group B strep >100,000, 10-50,000 pseudomonas in urine  #Functional and urge urinary incontinence  #Recurrent urinary tract infections  Urine infectious with elevated WBC and leuk esterase. CT showed thickened bladder wall consistent with acute cystitis. Patient has been afebrile, no leukocytosis, no sign pyelonephritis on imaging. History of UTIs. Recently started on mirabegron in early July.  Holding mirabegron now as it can cause urinary retention and contribute to UTIs. Urine culture with predominantly group B strep but some pseudomonas. Repeat urinalysis remained with pyuria and repeat culture pending.   -Antibiotic escalated to cefepime due to pseudomonas however switching to levofloxacin due to concern for cefepime induced neurotox   -Pending blood culture no growth to date  - Appreciate urology consultation. Post-void bladder ultrasound desired but this can technically be completed when the patient's time of void can be communicated and known. Due to cognitive limitations today, will plan to re-attempt the study on Monday pending ability to void on command.   - Consider cranberry, vitamin C, and/or methenamine after completion of acute UTI treatment. Outpatient urology follow-up recommended.     #Acute metabolic encephalopathy due to infection with underlying cognitive impairment  #Long history  of multifactorial cognitive impairment (last neurology note Nov 2022)  Head CT negative for bleed or acute pathology. Long history of mild difficulty with memory and work-finding that is often worse with acute illness. VBG with some CO2 retention after missing CPAP but alertness improved during the day. B12 and TSH reassuring. Will switch from cefepime to levofloxacin given underlying cognitive issues.  -Supportive care. Okay to wean  if she is safe.   - Odalis Barraza is roommate/caregiver/POA and helps provide history    Heart Failure with reduced ejection fraction, worsening  BNP elevated to 7,631. HF exacerbation could be contributing to her weakness. No increased dyspnea reported, lungs clear. Is not currently on SGLT2 given her history of UTIS.   -Continue PTA bumex 4mg daily  -Continue PTA Metoprolol succinate 25mg daily  -Continue PTA losartan 25mg daily  -Echo 7/17 showed severely reduced ejection fraction. Cardiology consulted. Recommended to repeat echocardiography prior to discharge and contact cardiology if the ejection fraction does not return to prior baseline.     Acute respiratory failure with hypercapnea and hypoxia, improving  Initially somnolent with blood gas consistent with CO2 retention. She has sleep apnea and is supposed to use CPAP at home but never uses it due to intolerance and taking off the mask.   - VBG after night without CPAP was acceptable. Defer CPAP use since it is not part of her home regimen.   - Repeat blood gas if excessive somnolence occurs.   - Wean oxygen as tolerated    CAD  CAD s/p  PCI x2 to LAD and CABG in 2018   -Continue PTA Aspirin  -lipitor 40mg daily    Diabetes on long-term insulin  -HOLD PTA lantus 32 units daily  -MSSI  -Hypoglycemic protocol    Hypothyroidism  -Continue PTA levothyroxine 150mcg    History of gout  - Rechecked uric acid per Odalis's request. Within normal range, no change to plan at this point.     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 She also enjoys watching birds, watching TV, and visiting with friends and family. Odalis has been her friend and roommate for many years and is her healthcare POA. Mariel's sisters both live out of state but they stay in touch by phone. Mariel enjoys being at home but is dependent for many cares.   Mariel has been full code in the past but she and Odalis are open to considering DNR given interval worsening of multiple health concerns. They would like to discuss this further in the future.           Diet: 2 Gram Sodium Diet    DVT Prophylaxis: Pneumatic Compression Devices  Gutierrez Catheter: Not present  Lines: None     Cardiac Monitoring: None  Code Status: Full Code      Clinically Significant Risk Factors                  # Hypertension: Noted on problem list  # Chronic heart failure with reduced ejection fraction: last echo with EF <40%            # DMII: A1C = 7.6 % (Ref range: <5.7 %) within past 6 months   # Severe Obesity: Estimated body mass index is 46.81 kg/m  as calculated from the following:    Height as of 7/10/24: 1.676 m (5' 6\").    Weight as of 7/10/24: 131.5 kg (290 lb)., PRESENT ON ADMISSION       # Financial/Environmental Concerns: none   # History of CABG: noted on surgical history       Disposition Plan     Medically Ready for Discharge: Anticipated in 2-4 Days             Aramis Blanc DO  Hospitalist Service, GOLD TEAM 21  [facility]  Securely message with [employer] (more info)  Text page via [facility] Paging/Directory   See signed in provider for up to date coverage information  ______________________________________________________________________    Interval History     No acute events overnight. She was awake and alert but was a little nonesensical in her answers. She responded well at first then started saying random things to me. Denied any new symptoms.     Physical Exam   Vital " Signs: Temp: 98.1  F (36.7  C) Temp src: Oral BP: (!) 154/73 Pulse: 80   Resp: 18 SpO2: 96 % O2 Device: None (Room air)    Weight: 0 lbs 0 oz    Gen: In bed no distress  Resp: Breathing comfortably on room air   CV: RRR  Abd: Obese  Neuro: Alert but unable to reliably answer questions    Medical Decision Making       55 MINUTES SPENT BY ME on the date of service doing chart review, history, exam, documentation & further activities per the note.      Data     I have personally reviewed the following data over the past 24 hrs:    TSH: N/A T4: N/A A1C: N/A       Imaging results reviewed over the past 24 hrs:   No results found for this or any previous visit (from the past 24 hour(s)).

## 2024-07-20 NOTE — PLAN OF CARE
Goal Outcome Evaluation:      Plan of Care Reviewed With: patient    Overall Patient Progress: no changeOverall Patient Progress: no change    Shift 1129-9477    Changes this shift:    Neuro: Alert and orientated x1, self only.    Cardiac: HR WNL, normotensive w/medication. Afebrile    Respiratory: O2 sat > 90% on RA.     GI/: incontinent B&B, purewick in place     Diet: 2g sodiumdiet,   at 2000    Pain: Denied pain, not fully able to assess if pt understood, but no physical signs of pain either    Skin: Redness on perineum    LDA's: R PIV TKO    Activity: Not oob during shift, bedside attendant present    Plan: Call light within reach, able to make needs known, continue with plan of care.

## 2024-07-21 NOTE — PROGRESS NOTES
[facility]    Medicine Progress Note - Hospitalist Service, GOLD TEAM 21    Date of Admission:  7/16/2024    Assessment & Plan   Mariel Ribeiro is a 79 year old female past medical history significant for morbid obesity, COPD, migraines, Hfpef, history of CABG x 3, history of type 2 diabetes with stage III CKD, recurrent falls who presented with concerns for generalized weakness. admitted on 7/16/2024 for altered mental status possibly secondary to acute cystitis. Also found to have worsening heart failure now with severely reduced ejection fraction.     #Acute cystitis  #Group B strep >100,000, 10-50,000 pseudomonas in urine  #Functional and urge urinary incontinence  #Recurrent urinary tract infections  Urine infectious with elevated WBC and leuk esterase. CT showed thickened bladder wall consistent with acute cystitis. Patient has been afebrile, no leukocytosis, no sign pyelonephritis on imaging. History of UTIs. Recently started on mirabegron in early July.  Holding mirabegron now as it can cause urinary retention and contribute to UTIs. Urine culture with predominantly group B strep but some pseudomonas. Repeat urinalysis remained with pyuria and repeat culture growing pseudomonas. Was switched to cefepime and now levofloxacin.  -Antibiotic escalated to cefepime due to pseudomonas however switching to levofloxacin due to concern for cefepime induced neurotox   -Pending blood culture no growth to date  - Appreciate urology consultation. Post-void bladder ultrasound desired but this can technically be completed when the patient's time of void can be communicated and known. Due to cognitive limitations today, will plan to re-attempt the study on Monday pending ability to void on command.   - Consider cranberry, vitamin C, and/or methenamine after completion of acute UTI treatment. Outpatient urology follow-up recommended.     #Acute metabolic encephalopathy due to  infection with underlying cognitive impairment  #Long history of multifactorial cognitive impairment (last neurology note Nov 2022)  Head CT negative for bleed or acute pathology. Long history of mild difficulty with memory and work-finding that is often worse with acute illness. VBG with some CO2 retention after missing CPAP but alertness improved during the day. B12 and TSH reassuring. Will switch from cefepime to levofloxacin given underlying cognitive issues. Continues to be more alert however confused when talking.  -Supportive care. Okay to wean  if she is safe.   - Odalis Barraza is roommate/caregiver/POA and helps provide history    Heart Failure with reduced ejection fraction, worsening  BNP elevated to 7,631. HF exacerbation could be contributing to her weakness. No increased dyspnea reported, lungs clear. Is not currently on SGLT2 given her history of UTIS.   -Continue PTA bumex 4mg daily  -Continue PTA Metoprolol succinate 25mg daily  -Continue PTA losartan 25mg daily  -Echo 7/17 showed severely reduced ejection fraction. Cardiology consulted. Recommended to repeat echocardiography prior to discharge and contact cardiology if the ejection fraction does not return to prior baseline.     Acute respiratory failure with hypercapnea and hypoxia, improving  Initially somnolent with blood gas consistent with CO2 retention. She has sleep apnea and is supposed to use CPAP at home but never uses it due to intolerance and taking off the mask.   - VBG after night without CPAP was acceptable. Defer CPAP use since it is not part of her home regimen.   - Repeat blood gas if excessive somnolence occurs.   - Wean oxygen as tolerated    CAD  CAD s/p  PCI x2 to LAD and CABG in 2018   -Continue PTA Aspirin  -lipitor 40mg daily    Diabetes on long-term insulin  -HOLD PTA lantus 32 units daily  -MSSI  -Hypoglycemic protocol    Hypothyroidism  -Continue PTA levothyroxine 150mcg    History of gout  - Rechecked uric  "acid per Odalis's request. Within normal range, no change to plan at this point.     Non cardiac chest pain   Noted chest pain 7/21, which was reproducible over patients known cyst on her left breast. 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 She also enjoys watching birds, watching TV, and visiting with friends and family. Odalis has been her friend and roommate for many years and is her healthcare POA. Mariel's sisters both live out of state but they stay in touch by phone. Mariel enjoys being at home but is dependent for many cares.   Mariel has been full code in the past but she and Odalis are open to considering DNR given interval worsening of multiple health concerns. They would like to discuss this further in the future.           Diet: 2 Gram Sodium Diet    DVT Prophylaxis: Pneumatic Compression Devices  Gutierrez Catheter: Not present  Lines: None     Cardiac Monitoring: None  Code Status: Full Code      Clinically Significant Risk Factors                  # Hypertension: Noted on problem list  # Chronic heart failure with reduced ejection fraction: last echo with EF <40%            # DMII: A1C = 7.6 % (Ref range: <5.7 %) within past 6 months   # Severe Obesity: Estimated body mass index is 46.81 kg/m  as calculated from the following:    Height as of 7/10/24: 1.676 m (5' 6\").    Weight as of 7/10/24: 131.5 kg (290 lb).        # Financial/Environmental Concerns: none   # History of CABG: noted on surgical history       Disposition Plan     Medically Ready for Discharge: Anticipated in 2-4 Days             Aramis Blanc DO  Hospitalist Service, GOLD TEAM 21  [facility]  Securely message with [employer] (more info)  Text page via [facility] Paging/Directory   See signed in provider for up to date coverage " information  ______________________________________________________________________    Interval History     No acute events overnight. Continues to be confused after switch from cefepime although only been one day. Had some chest pain this morning over the known cyst on her left chest wall.     Physical Exam   Vital Signs: Temp: 97.8  F (36.6  C) Temp src: Oral BP: (!) 153/50 Pulse: 74   Resp: 18 SpO2: 92 % O2 Device: None (Room air)    Weight: 0 lbs 0 oz    Gen: In bed no distress  Resp: Breathing comfortably on room air   CV: RRR  Abd: Obese  Neuro: Alert but unable to reliably answer questions    Medical Decision Making       55 MINUTES SPENT BY ME on the date of service doing chart review, history, exam, documentation & further activities per the note.      Data     I have personally reviewed the following data over the past 24 hrs:    6.5  \   11.0 (L)   / 155     140 103 40.4 (H) /  173 (H)   3.5 25 1.29 (H) \       Imaging results reviewed over the past 24 hrs:   No results found for this or any previous visit (from the past 24 hour(s)).

## 2024-07-21 NOTE — PLAN OF CARE
Goal Outcome Evaluation:      Plan of Care Reviewed With: patient    Overall Patient Progress: no changeOverall Patient Progress: no change    Shift [id number]     Changes this shift:     Neuro: Alert and orientated x1, self only.     Cardiac: HR WNL, normotensive w/medication. Afebrile     Respiratory: O2 sat > 90% on RA.      GI/: incontinent B&B, purewick in place      Diet: 2g sodium diet     Pain: Denied pain    Skin: Redness on perineum     LDA's: R PIV TKO     Activity: Not oob during shift, bedside attendant present     Plan: Call light within reach, able to make needs known, continue with plan of care.

## 2024-07-22 NOTE — PLAN OF CARE
Goal Outcome Evaluation:       Patient is A & O to self; speech clear; anticipate all her needs to be met; received scheduled medications; assisted with meals after set up, and ate 100% of BF and lunch; complied with turn, reposition, and bed mobility; denied SOB, pain, and discomfort; HOB elevated; will continue with POC.  BP (!) 119/98 (BP Location: Left leg)   Pulse 68   Temp 99.1  F (37.3  C) (Oral)   Resp 18   LMP  (LMP Unknown)   SpO2 98%     Ben Lemus RN

## 2024-07-22 NOTE — PROGRESS NOTES
[facility]    Medicine Progress Note - Hospitalist Service, GOLD TEAM 21    Date of Admission:  7/16/2024    Assessment & Plan   Mariel Ribeiro is a 79 year old female past medical history significant for morbid obesity, COPD, migraines, Hfpef, history of CABG x 3, history of type 2 diabetes with stage III CKD, recurrent falls who presented with concerns for generalized weakness. admitted on 7/16/2024 for altered mental status possibly secondary to acute cystitis. Also found to have worsening heart failure now with severely reduced ejection fraction.     #Acute cystitis  #Group B strep >100,000, 10-50,000 pseudomonas in urine  #Functional and urge urinary incontinence  #Recurrent urinary tract infections  Urine infectious with elevated WBC and leuk esterase. CT showed thickened bladder wall consistent with acute cystitis. Patient has been afebrile, no leukocytosis, no sign pyelonephritis on imaging. History of UTIs. Recently started on mirabegron in early July.  Holding mirabegron now as it can cause urinary retention and contribute to UTIs. Urine culture with predominantly group B strep but some pseudomonas. Repeat urinalysis remained with pyuria and repeat culture growing pseudomonas. Was switched to cefepime and now levofloxacin.  -Antibiotic escalated to cefepime due to pseudomonas however switching to levofloxacin due to concern for cefepime induced neurotox   -Pending blood culture no growth to date  - Appreciate urology consultation. Post-void bladder ultrasound desired but this can technically be completed when the patient's time of void can be communicated and known. Attempted Monday but again could not follow commands well enough for post void to be obtained.  - Consider cranberry, vitamin C, and/or methenamine after completion of acute UTI treatment. Outpatient urology follow-up recommended.     #Acute metabolic encephalopathy due to infection with underlying  cognitive impairment  #Long history of multifactorial cognitive impairment (last neurology note Nov 2022)  Head CT negative for bleed or acute pathology. Long history of mild difficulty with memory and work-finding that is often worse with acute illness. VBG with some CO2 retention after missing CPAP but alertness improved during the day. B12 and TSH reassuring. Will switch from cefepime to levofloxacin given underlying cognitive issues. Continues to be more alert however confused when talking.  -Supportive care. Okay to wean  if she is safe.   - Odalis Madi is roommate/caregiver/POA and helps provide history    Heart Failure with reduced ejection fraction, worsening  BNP elevated to 7,631. HF exacerbation could be contributing to her weakness. No increased dyspnea reported, lungs clear. Is not currently on SGLT2 given her history of UTIS.   -Continue PTA bumex 4mg daily  -Continue PTA Metoprolol succinate 25mg daily  -Continue PTA losartan 25mg daily  -Echo 7/17 showed severely reduced ejection fraction. Cardiology consulted. Recommended to repeat echocardiography prior to discharge and contact cardiology if the ejection fraction does not return to prior baseline.     Acute respiratory failure with hypercapnea and hypoxia, improving  Initially somnolent with blood gas consistent with CO2 retention. She has sleep apnea and is supposed to use CPAP at home but never uses it due to intolerance and taking off the mask.   - VBG after night without CPAP was acceptable. Defer CPAP use since it is not part of her home regimen.   - Repeat blood gas if excessive somnolence occurs.   - Wean oxygen as tolerated    CAD  CAD s/p  PCI x2 to LAD and CABG in 2018   -Continue PTA Aspirin  -lipitor 40mg daily    Diabetes on long-term insulin  -HOLD PTA lantus 32 units daily  -MSSI  -Hypoglycemic protocol    Hypothyroidism  -Continue PTA levothyroxine 150mcg    History of gout  - Rechecked uric acid per Odalis's request.  "Within normal range, no change to plan at this point.     Non cardiac chest pain   Noted chest pain 7/21, which was reproducible over patients known cyst on her left breast. This has been noted in clinic visits as well   - CTM     Constipation   This is a chronic issues per patients care giver odalis.   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 She also enjoys watching birds, watching TV, and visiting with friends and family. Odalis has been her friend and roommate for many years and is her healthcare POA. Mariel's sisters both live out of state but they stay in touch by phone. Mariel enjoys being at home but is dependent for many cares.   Mariel has been full code in the past but she and Odalis are open to considering DNR given interval worsening of multiple health concerns. They would like to discuss this further in the future.           Diet: 2 Gram Sodium Diet    DVT Prophylaxis: Pneumatic Compression Devices  Gutierrez Catheter: Not present  Lines: None     Cardiac Monitoring: None  Code Status: Full Code      Clinically Significant Risk Factors                  # Hypertension: Noted on problem list  # Chronic heart failure with reduced ejection fraction: last echo with EF <40%            # DMII: A1C = 7.6 % (Ref range: <5.7 %) within past 6 months   # Severe Obesity: Estimated body mass index is 46.81 kg/m  as calculated from the following:    Height as of 7/10/24: 1.676 m (5' 6\").    Weight as of 7/10/24: 131.5 kg (290 lb).        # Financial/Environmental Concerns: none   # History of CABG: noted on surgical history       Disposition Plan     Medically Ready for Discharge: Anticipated in 2-4 Days             Aramis Blanc DO  Hospitalist Service, GOLD TEAM 21  [facility]  Securely message with [employer] (more info)  Text page via [email or web address] Paging/Directory   See signed " in provider for up to date coverage information  ______________________________________________________________________    Interval History     No acute events overnight. Doing well today. Patients decision maker at bedside and I gave updates. Per Odalis Floyd doing much better than earlier in her hospitalization. She is hopeful that she can go to  TCU as she is uncertain if she can care for her at this point.     Physical Exam   Vital Signs: Temp: 97.6  F (36.4  C) Temp src: Oral BP: 119/63 Pulse: 73   Resp: 18 SpO2: 97 % O2 Device: None (Room air)    Weight: 0 lbs 0 oz    Gen: In bed no distress  Resp: Breathing comfortably on room air   CV: RRR  Abd: Obese  Neuro: Alert but unable to reliably answer questions    Medical Decision Making       55 MINUTES SPENT BY ME on the date of service doing chart review, history, exam, documentation & further activities per the note.      Data     I have personally reviewed the following data over the past 24 hrs:    N/A  \   N/A   / N/A     138 103 39.4 (H) /  145 (H)   4.3 24 1.31 (H) \       Imaging results reviewed over the past 24 hrs:   Recent Results (from the past 24 hour(s))   US Bladder w Pre and Post Void Residual    Narrative    Exam: US BLADDER W PRE AND POST VOID RESIDUAL, 7/22/2024 10:42 AM    Indication: Recurrent UTIs    Additional information provided by sonographer: Patient unable to  follow commands due to mental status    Comparison: CT abdomen and pelvis, 7/16/2024    Findings:     The urinary bladder is moderately distended, with a volume of 351 mL.  Small amount of echogenicities layering dependently without  vascularity on color Doppler (image 7).  Unable to obtain post void bladder volume.      Impression    Impression: Moderate urinary bladder distention; layering dependent  echogenicities, nonspecific, underlying cystitis not excluded. Post  void residual could not be obtained.    I have personally reviewed the examination and initial  interpretation  and I agree with the findings.    BERTIN MENDOZA MD         SYSTEM ID:  [id number]

## 2024-07-22 NOTE — PLAN OF CARE
Goal Outcome Evaluation:      Plan of Care Reviewed With: patient    Overall Patient Progress: no changeOverall Patient Progress: no change    Shift [id number]     Changes this shift:     Neuro: Alert and orientated x1, self only.     Cardiac: HR WNL, normotensive w/medication. Afebrile     Respiratory: O2 sat > 90% on RA.      GI/: incontinent B&B, purewick in place      Diet: 2g sodium diet     Pain: Denied pain     Skin: Redness on perineum     LDA's: R PIV SL with a no no covering     Activity: Not oob during shift, bedside attendant present     Plan: Call light within reach, able to make needs known, continue with plan of care.

## 2024-07-22 NOTE — PROGRESS NOTES
Care Management Follow Up    Length of Stay (days): 6    Expected Discharge Date: 07/24/2024     Concerns to be Addressed: discharge planning     Patient plan of care discussed at interdisciplinary rounds: Yes    Anticipated Discharge Disposition: Transitional Care     Anticipated Discharge Services:  (Post Acute Therapies)  Anticipated Discharge DME:  (SNF to provide necessary DME)    Patient/family educated on Medicare website which has current facility and service quality ratings: yes  Education Provided on the Discharge Plan: Yes  Patient/Family in Agreement with the Plan: yes    Referrals Placed by CM/SW:      Referrals to be sent when patient is off 1:1 attendant    [facility]  [address] [place] [address]  [phone]    [facility]  [address] [place]  [address]  P: [phone]  F: [phone]    [facility]  [address] [address]  [phone]    [facility]  [address] [place] [address]  [phone]     [facility]  [address] [place] [address]  [phone]     Private pay costs discussed: Not applicable    Additional Information:    Patient medically ready for discharge but remains on 1:1 attendant. Nursing staff to attempt to wean attendant today.    CHW to meet with patient and gather TCU preferences. Referrals will be sent once patient is off 1:1 attendant for 24 hours.          FRANCISCO Mckenzie, LSW  8 Med Surg   [facility]  Phone: [phone]  Vocera: Searchable by name or group: 8 Med Surg Vocera

## 2024-07-22 NOTE — PLAN OF CARE
[id number]    Goal Outcome Evaluation:      Plan of Care Reviewed With: patient, friend    Overall Patient Progress: no changeOverall Patient Progress: no change    Outcome Evaluation: No acute changes this shift    Pt is alert and oriented to self. VSS on RA. Not oob this shift. LBM 7/16 per chart. Pt drank most of her magnesium glycinate throughout the day. Pt denies abdominal discomfort. Has mild tenderness with palpation. Abdomen is soft. BS active. Incontinent of urine. Purewick in place. Dinner . Sliding scale insulin given. R PIV SL. Continue POC.

## 2024-07-22 NOTE — PLAN OF CARE
Goal Outcome Evaluation:       Patient is A & O to self; will anticipate all her cares to be met by staff; continuing with staff attendant in the room A x 2 with turn, reposition, and bed mobility; received scheduled medications, and PRN TUMs 1000 mg and Tylenol 1000 mg at 1010, and 1843; was asleep most time during the shift; denied SOB, and discomfort. Will continue with POC.  /61 (BP Location: Right leg, Patient Position: Supine)   Pulse 80   Temp 99  F (37.2  C) (Tympanic)   Resp 18   LMP  (LMP Unknown)   SpO2 94%     Ben Lemus RN

## 2024-07-23 NOTE — PLAN OF CARE
Goal Outcome Evaluation:    VS: BP (!) 141/70 (BP Location: Right arm)   Pulse 91   Temp 98.4  F (36.9  C) (Oral)   Resp 16   LMP  (LMP Unknown)   SpO2 95%     O2: Pt stable on RA, denies chest pain and SOB   Neuro: AxO to self, disoriented to time ,place and situation.    Bowel/Bladder: Incontinent of B&B     LBM: 7/16/2024   Diet: 2 gram sodium diet    Skin: Redness on perineum , pure wick in place   Pain: Denies pain    Activity: Not oob    Dressings: None   LDA: PIV R lower forearm SL   Equipment: Call light , personal belongings    Plan: Anticipated discharge 2-4 days     Additional Info:        Tc Guzman RN on 7/23/2024 at 3:01 PM

## 2024-07-23 NOTE — PROGRESS NOTES
"CLINICAL NUTRITION SERVICES    Reviewed nutrition risk factors due to LOS. Pt is tolerating diet, eating well per nursing documentation. Per [facility] meal order review, pt is consistently ordering TID full meals. No nutrition issues identified at this time. RD will follow peripherally at this time, unless consulted.    There is no height or weight on file to calculate BMI.    Wt Readings from Last 10 Encounters:   07/10/24 131.5 kg (290 lb)   12/01/23 131.5 kg (290 lb)   10/03/23 128 kg (282 lb 3.2 oz)   09/29/23 130.9 kg (288 lb 9.6 oz)   09/27/23 130 kg (286 lb 9.6 oz)   09/21/23 134.3 kg (296 lb)   09/14/23 133.7 kg (294 lb 12.8 oz)   09/08/23 133.8 kg (294 lb 15.6 oz)   09/05/23 138.3 kg (305 lb)   05/31/23 134.4 kg (296 lb 3.2 oz)     Keila German RDN, LD   6 & 8 Med/Surg RD  Mon-Fri Vocera contact: By name, or \"6 [OR] 8 Med Surg Clinical Dietitian\"  Weekend RD Vocera: \"Weekend Holiday Clinical Dietitian\"          "

## 2024-07-23 NOTE — PROGRESS NOTES
VS: /65 (BP Location: Left leg)   Pulse 81   Temp 98.5  F (36.9  C) (Oral)   Resp 18   LMP  (LMP Unknown)   SpO2 95%     O2:  SpO2 95%    Neuro: AxO to self, disoriented to time ,place and situation.    Bowel/Bladder: Incontinent of B&B     LBM: 07/23/2023   Diet: 2 gram sodium diet    Skin: Redness on perineum , pure wick in place    Pain: Denies pain    Activity: Assist x 2/ Bed bound    Dressings: No incisional dressing   LDA: PIV R lower forearm SL    Equipment: Call light , personal belongings    Plan: Anticipated discharge 2-4 days     Additional Info: Past medical history significant for morbid obesity, COPD, migraines, Hfpef, history of CABG x 3, history of type 2 diabetes with stage III CKD

## 2024-07-23 NOTE — PLAN OF CARE
Goal Outcome Evaluation:      Plan of Care Reviewed With: patient    Overall Patient Progress: no change    Blood pressure (!) 141/70, pulse 91, temperature 98.4  F (36.9  C), temperature source Oral, resp. rate 16, SpO2 95%, not currently breastfeeding.    Patient alert and oriented to self, stable on room air, denies pain, chest pain and SOB. Not out oob bed during shift, incontinent of bowel, purewick in place. 1:1 in place for safety measure. In bed resting, call light within reach. Plan of care ongoing

## 2024-07-23 NOTE — PROGRESS NOTES
Care Management Follow Up    Length of Stay (days): 7    Expected Discharge Date: 07/25/2024     Concerns to be Addressed: discharge planning     Patient plan of care discussed at interdisciplinary rounds: Yes    Anticipated Discharge Disposition: Transitional Care     Anticipated Discharge Services:  (Post Acute Therapies)  Anticipated Discharge DME:  (SNF to provide necessary DME)    Patient/family educated on Medicare website which has current facility and service quality ratings: yes  Education Provided on the Discharge Plan: Yes  Patient/Family in Agreement with the Plan: yes    Referrals Placed by CM/SW:      Referrals to be sent when patient is off 1:1 attendant     [facility]  [address] [place] [address]  [phone]     [facility]  [address] [place]  [address]  P: [phone]  F: [phone]     [facility]  [address] [address]  [phone]     [facility]  [address] [place] [address]  [phone]      [facility]  [address] [place] [address]  [phone]     Private pay costs discussed: Not applicable    Additional Information:    Patient's 1:1 attendant removed about 10:30am this morning. If patient successfully remains off 1:1, SW will send referrals tomorrow at/around 10:30am.        FRANCISCO Mckenzie, LSW  8 Med Surg   [facility]  Phone: [phone]  Vocera: Searchable by name or group: 8 Med Surg Vocera

## 2024-07-23 NOTE — PROGRESS NOTES
[facility]    Medicine Progress Note - Hospitalist Service, GOLD TEAM 21    Date of Admission:  7/16/2024    Assessment & Plan   Mariel Ribeiro is a 79 year old female past medical history significant for morbid obesity, COPD, migraines, Hfpef, history of CABG x 3, history of type 2 diabetes with stage III CKD, recurrent falls who presented with concerns for generalized weakness. admitted on 7/16/2024 for altered mental status possibly secondary to acute cystitis. Also found to have worsening heart failure now with severely reduced ejection fraction.     #Acute cystitis  #Group B strep >100,000, 10-50,000 pseudomonas in urine  #Functional and urge urinary incontinence  #Recurrent urinary tract infections  Urine infectious with elevated WBC and leuk esterase. CT showed thickened bladder wall consistent with acute cystitis. Patient has been afebrile, no leukocytosis, no sign pyelonephritis on imaging. History of UTIs. Recently started on mirabegron in early July.  Holding mirabegron now as it can cause urinary retention and contribute to UTIs. Urine culture with predominantly group B strep but some pseudomonas. Repeat urinalysis remained with pyuria and repeat culture growing pseudomonas. Was switched to cefepime and now levofloxacin.  -Antibiotic escalated to cefepime due to pseudomonas however switching to levofloxacin now s/p 5 days total of abx   -Pending blood culture no growth to date  - Appreciate urology consultation. Post-void bladder ultrasound desired but this can technically be completed when the patient's time of void can be communicated and known. Attempted Monday but again could not follow commands well enough for post void to be obtained.  - Consider cranberry, vitamin C, and/or methenamine after completion of acute UTI treatment. Outpatient urology follow-up recommended.     #Acute metabolic encephalopathy due to infection with underlying cognitive  impairment  #Long history of multifactorial cognitive impairment (last neurology note Nov 2022)  Head CT negative for bleed or acute pathology. Long history of mild difficulty with memory and work-finding that is often worse with acute illness. VBG with some CO2 retention after missing CPAP but alertness improved during the day. B12 and TSH reassuring. Will switch from cefepime to levofloxacin given underlying cognitive issues. Continues to be more alert however confused when talking.  -Supportive care. Okay to wean  if she is safe.   - Odalis Madi is roommate/caregiver/POA and helps provide history    Heart Failure with reduced ejection fraction, worsening  BNP elevated to 7,631. HF exacerbation could be contributing to her weakness. No increased dyspnea reported, lungs clear. Is not currently on SGLT2 given her history of UTIS.   -Continue PTA bumex 4mg daily  -Continue PTA Metoprolol succinate 25mg daily  -Continue PTA losartan 25mg daily  -Echo 7/17 showed severely reduced ejection fraction. Cardiology consulted. Recommended to repeat echocardiography prior to discharge and contact cardiology if the ejection fraction does not return to prior baseline.     Acute respiratory failure with hypercapnea and hypoxia, improving  Initially somnolent with blood gas consistent with CO2 retention. She has sleep apnea and is supposed to use CPAP at home but never uses it due to intolerance and taking off the mask.   - VBG after night without CPAP was acceptable. Defer CPAP use since it is not part of her home regimen.   - Repeat blood gas if excessive somnolence occurs.   - Wean oxygen as tolerated    CAD  CAD s/p  PCI x2 to LAD and CABG in 2018   -Continue PTA Aspirin  -lipitor 40mg daily    Diabetes on long-term insulin  -HOLD PTA lantus 32 units daily  -MSSI  -Hypoglycemic protocol    Hypothyroidism  -Continue PTA levothyroxine 150mcg    History of gout  - Rechecked uric acid per Odalis's request. Within  "normal range, no change to plan at this point.     Non cardiac chest pain   Noted chest pain 7/21, which was reproducible over patients known cyst on her left breast. This has been noted in clinic visits as well   - CTM     Constipation   This is a chronic issues per patients care giver odalis.   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 She also enjoys watching birds, watching TV, and visiting with friends and family. Odalis has been her friend and roommate for many years and is her healthcare POA. Mariel's sisters both live out of state but they stay in touch by phone. Mariel enjoys being at home but is dependent for many cares.   Mariel has been full code in the past but she and Odalis are open to considering DNR given interval worsening of multiple health concerns. They would like to discuss this further in the future.           Diet: 2 Gram Sodium Diet    DVT Prophylaxis: Pneumatic Compression Devices  Gutierrez Catheter: Not present  Lines: None     Cardiac Monitoring: None  Code Status: Full Code      Clinically Significant Risk Factors           # Hypercalcemia: Highest Ca = 10.2 mg/dL in last 2 days, will monitor as appropriate        # Hypertension: Noted on problem list  # Chronic heart failure with reduced ejection fraction: last echo with EF <40%            # DMII: A1C = 7.6 % (Ref range: <5.7 %) within past 6 months   # Severe Obesity: Estimated body mass index is 46.81 kg/m  as calculated from the following:    Height as of 7/10/24: 1.676 m (5' 6\").    Weight as of 7/10/24: 131.5 kg (290 lb).        # Financial/Environmental Concerns: none   # History of CABG: noted on surgical history       Disposition Plan     Medically Ready for Discharge: Anticipated in 2-4 Days             Aramis Blanc DO  Hospitalist Service, GOLD TEAM 21  [facility]  Securely " message with [employer] (more info)  Text page via [facility] Paging/Directory   See signed in provider for up to date coverage information  ______________________________________________________________________    Interval History     No acute events overnight. Doing well today. 1:1 now off so will look into TCU's tomorrow. Ordered repeat TTE for tomorrow per cards to evaluate LVEF prior to discharge.     Physical Exam   Vital Signs: Temp: 98.4  F (36.9  C) Temp src: Oral BP: (!) 141/70 Pulse: 91   Resp: 16 SpO2: 95 % O2 Device: None (Room air)    Weight: 0 lbs 0 oz    Gen: In bed no distress  Resp: Breathing comfortably on room air   CV: RRR  Abd: Obese  Neuro: Alert but unable to reliably answer questions    Medical Decision Making       45 MINUTES SPENT BY ME on the date of service doing chart review, history, exam, documentation & further activities per the note.      Data     I have personally reviewed the following data over the past 24 hrs:    6.6  \   11.0 (L)   / 159     140 102 39.9 (H) /  239 (H)   4.2 29 1.36 (H) \       Imaging results reviewed over the past 24 hrs:   No results found for this or any previous visit (from the past 24 hour(s)).

## 2024-07-24 NOTE — PROGRESS NOTES
VS: /63 (BP Location: Left arm)   Pulse 76   Temp 98.8  F (37.1  C) (Oral)   Resp 18   LMP  (LMP Unknown)   SpO2 95%      O2: 100% RA    Output: Voiding via purewick    Last BM: 7/24   Activity: Pt in bed and unable to sit at the side of bed today.    Skin: Lower forearms are bruised    Pain: Pt is non responsive to questions about pain.    CMS: Pt is un oriented    Dressing: N/A    Diet: Regular thin liquid    LDA: PIV LLFA    Plan: Writer will speak to Mariella at [employer] today. Writer will also page  To assess pt today.    Additional Info: Pt very fatigued and not using words to communicate. Pt unable to feed her self and take pills without applesauce. Pt unable to sit up at bed side without forcing herself backwards. Assistance with eating breakfast took over 60 minutes today.In the morning I tried arousing her several times and pt refused to open her eyes and respond. She was very sleepy.

## 2024-07-24 NOTE — PLAN OF CARE
Goal Outcome Evaluation:      Plan of Care Reviewed With: patient    Overall Patient Progress: no change    Blood pressure (!) 150/87, pulse 76, temperature 98.5  F (36.9  C), temperature source Oral, resp. rate 18, SpO2 95%, not currently breastfeeding.    Patient alert to self, denies pain, not oob during the shift. Incontinent of bowel and bladder, LBM-07/24, in bed resting, bed alarm on, call light within reach. Plan of care ongoing

## 2024-07-24 NOTE — PROGRESS NOTES
[facility]    Medicine Progress Note - Hospitalist Service, GOLD TEAM 21    Date of Admission:  7/16/2024    Assessment & Plan   Mariel Ribeiro is a 79 year old female past medical history significant for morbid obesity, COPD, migraines, Hfpef, history of CABG x 3, history of type 2 diabetes with stage III CKD, recurrent falls who presented with concerns for generalized weakness. admitted on 7/16/2024 for altered mental status possibly secondary to acute cystitis. Also found to have worsening heart failure now with severely reduced ejection fraction.     #Acute cystitis  #Group B strep >100,000, 10-50,000 pseudomonas in urine  #Functional and urge urinary incontinence  #Recurrent urinary tract infections  Urine infectious with elevated WBC and leuk esterase. CT showed thickened bladder wall consistent with acute cystitis. Patient has been afebrile, no leukocytosis, no sign pyelonephritis on imaging. History of UTIs. Recently started on mirabegron in early July.  Holding mirabegron now as it can cause urinary retention and contribute to UTIs. Urine culture with predominantly group B strep but some pseudomonas. Repeat urinalysis remained with pyuria and repeat culture growing pseudomonas. Was switched to cefepime and now levofloxacin.  -Antibiotic escalated to cefepime due to pseudomonas however switching to levofloxacin now s/p 5 days total of abx   -Pending blood culture no growth to date  - Appreciate urology consultation. Post-void bladder ultrasound desired but this can technically be completed when the patient's time of void can be communicated and known. Attempted Monday but again could not follow commands well enough for post void to be obtained.  - Consider cranberry, vitamin C, and/or methenamine after completion of acute UTI treatment. Outpatient urology follow-up recommended.     #Acute metabolic encephalopathy due to infection with underlying cognitive  impairment  #Long history of multifactorial cognitive impairment (last neurology note Nov 2022)  Head CT negative for bleed or acute pathology. Long history of mild difficulty with memory and work-finding that is often worse with acute illness. VBG with some CO2 retention after missing CPAP but alertness improved during the day. B12 and TSH reassuring. Will switch from cefepime to levofloxacin given underlying cognitive issues. Continues to be more alert however confused when talking.  -Supportive care. Okay to wean  if she is safe.   - Odalis Madi is roommate/caregiver/POA and helps provide history    Heart Failure with reduced ejection fraction, worsening  BNP elevated to 7,631. HF exacerbation could be contributing to her weakness. No increased dyspnea reported, lungs clear. Is not currently on SGLT2 given her history of UTIS.   -Continue PTA bumex 4mg daily  -Continue PTA Metoprolol succinate 25mg daily  -Continue PTA losartan 25mg daily  -Echo 7/17 showed severely reduced ejection fraction. Cardiology consulted. Recommended to repeat echocardiography prior to discharge and contact cardiology if the ejection fraction does not return to prior baseline.     Acute respiratory failure with hypercapnea and hypoxia, improving  Initially somnolent with blood gas consistent with CO2 retention. She has sleep apnea and is supposed to use CPAP at home but never uses it due to intolerance and taking off the mask.   - VBG after night without CPAP was acceptable. Defer CPAP use since it is not part of her home regimen.   - Repeat blood gas if excessive somnolence occurs.   - Wean oxygen as tolerated    CAD  CAD s/p  PCI x2 to LAD and CABG in 2018   -Continue PTA Aspirin  -lipitor 40mg daily    Diabetes on long-term insulin  -HOLD PTA lantus 32 units daily  -MSSI  -Hypoglycemic protocol    Hypothyroidism  -Continue PTA levothyroxine 150mcg    History of gout  - Rechecked uric acid per Odalis's request. Within  "normal range, no change to plan at this point.     Non cardiac chest pain   Noted chest pain 7/21, which was reproducible over patients known cyst on her left breast. This has been noted in clinic visits as well   - CTM     Constipation   This is a chronic issues per patients care giver odalis.   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 She also enjoys watching birds, watching TV, and visiting with friends and family. Odalis has been her friend and roommate for many years and is her healthcare POA. Mariel's sisters both live out of state but they stay in touch by phone. Mariel enjoys being at home but is dependent for many cares.   Mariel has been full code in the past but she and Odalis are open to considering DNR given interval worsening of multiple health concerns. They would like to discuss this further in the future.           Diet: 2 Gram Sodium Diet    DVT Prophylaxis: Pneumatic Compression Devices  Gutierrez Catheter: Not present  Lines: None     Cardiac Monitoring: None  Code Status: Full Code      Clinically Significant Risk Factors                  # Hypertension: Noted on problem list  # Chronic heart failure with reduced ejection fraction: last echo with EF <40%            # DMII: A1C = 7.6 % (Ref range: <5.7 %) within past 6 months   # Severe Obesity: Estimated body mass index is 46.81 kg/m  as calculated from the following:    Height as of 7/10/24: 1.676 m (5' 6\").    Weight as of 7/10/24: 131.5 kg (290 lb).        # Financial/Environmental Concerns: none   # History of CABG: noted on surgical history       Disposition Plan     Medically Ready for Discharge: Anticipated in 2-4 Days             Aramis Blanc DO  Hospitalist Service, GOLD TEAM 21  [facility]  Securely message with [employer] (more info)  Text page via [employer] Paging/Directory   See signed in " provider for up to date coverage information  ______________________________________________________________________    Interval History     No acute events overnight. Has been off of 1:1 since yesterday at 10 am. Little more somnolent this morning compared to yesterday but answering questions. Not wanting to get out of bed. No other new changes.      Physical Exam   Vital Signs: Temp: 98.8  F (37.1  C) Temp src: Oral BP: 104/63 Pulse: 76   Resp: 18 SpO2: 95 % O2 Device: None (Room air)    Weight: 0 lbs 0 oz    Gen: In bed no distress  Resp: Breathing comfortably on room air   CV: RRR  Abd: Obese  Neuro: Alert but unable to reliably answer questions    Medical Decision Making       45 MINUTES SPENT BY ME on the date of service doing chart review, history, exam, documentation & further activities per the note.      Data     I have personally reviewed the following data over the past 24 hrs:    8.5  \   11.1 (L)   / 164     138 101 39.8 (H) /  155 (H)   4.6 27 1.32 (H) \       Imaging results reviewed over the past 24 hrs:   No results found for this or any previous visit (from the past 24 hour(s)).

## 2024-07-24 NOTE — PROGRESS NOTES
Care Management Follow Up    Length of Stay (days): 8    Expected Discharge Date: 07/25/2024     Concerns to be Addressed: discharge planning     Patient plan of care discussed at interdisciplinary rounds: Yes    Anticipated Discharge Disposition: Transitional Care     Anticipated Discharge Services:  (Post Acute Therapies)  Anticipated Discharge DME:  (SNF to provide necessary DME)    Patient/family educated on Medicare website which has current facility and service quality ratings: yes  Education Provided on the Discharge Plan: Yes  Patient/Family in Agreement with the Plan: yes    Referrals Placed by CM/SW:      Pending:    [facility]  [address] [place] [address]  [phone]  7/24: Initial referral sent     [facility]  [address] [place] [address]  [phone]   7/24: Initial referral sent       Declined:    [facility]  [address] [place] [address]  [phone]   7/24: Initial referral sent. Declined, no solid discharge plan, bariatric needs, behavior issues/concerns    [facility]  [address] [address]  [phone]  7/24: Initial referral sent. Declined, bed not available, bariatric needs     [facility]  [address] [place]  [address]  P: [phone]  F: [phone]  7/24: Initial referral sent. Declined, no bed available    Private pay costs discussed: Not applicable    Additional Information:    TCU referrals sent (see above).          Maribeth Prieto, SONIAW, LSW  8 Med Surg   [facility]  Phone: [phone]  Vocera: Searchable by name or group: 8 Med Surg Vocera

## 2024-07-25 NOTE — PLAN OF CARE
Goal Outcome Evaluation:      Plan of Care Reviewed With: patient    Overall Patient Progress: no change    Blood pressure 104/63, pulse 76, temperature 98.8  F (37.1  C), temperature source Oral, resp. rate 18, SpO2 95%, not currently breastfeeding.    Patient alert to self, disoriented x3, stable on room air, no SOB observed. Denies pain, assist of 2 with transfer and 3 for checked/changed. Incontinent of bowel and bladder, purewick in place. Plan of care ongoing

## 2024-07-25 NOTE — PROGRESS NOTES
7/25/24  Care Management Follow Up    Update: CHW called Odalis ([phone]) to collect additional TCU preferences. The following was chosen.    Pending:     [facility]  [address] [place] [address]  [phone]  7/24: Initial referral sent  7/25: CHW called admissions and LVM requesting a call back. Pt is potentially being reviewed for LTC    [facility]  [address] [facility] [address]  [place] [address]  PH: [phone]   7/25: Initial referral sent     [facility]  [address] [place] [address]  PH: [phone]  7/25: Initial referral sent     [facility]  [address] [place] [address]  PH: [phone]  FAX: [phone]  7/25: Initial referral sent     [facility]  [address] [place] [address]  PH: [phone]  7/25: Initial referral sent     [facility]  Shanika- [place] Liaison  PH: [phone]  FAX: [phone]  7/25: Initial referral sent      Declined:     [facility]  [address] [place] [address]  [phone]   7/24: Initial referral sent. Declined, no solid discharge plan, bariatric needs, behavior issues/concerns     [facility]  [address] [address]  [phone]  7/24: Initial referral sent. Declined, bed not available, bariatric needs     [facility]  [address] [place]  [address]  P: [phone]  F: [phone]  7/24: Initial referral sent. Declined, no bed available    [facility]  [address] [place] [address]  [phone]   7/24: Initial referral sent  7/25: Declined due to no bed available     Sarah Dial  Inpatient CHW  [facility] 5 Ortho, 8 Med Surge, & ED  PH: [phone]

## 2024-07-25 NOTE — PROGRESS NOTES
[facility]    Medicine Progress Note - Hospitalist Service, GOLD TEAM 21    Date of Admission:  7/16/2024    Assessment & Plan   Mariel Ribeiro is a 79 year old female past medical history significant for morbid obesity, COPD, migraines, Hfpef, history of CABG x 3, history of type 2 diabetes with stage III CKD, recurrent falls who presented with concerns for generalized weakness. admitted on 7/16/2024 for altered mental status possibly secondary to acute cystitis. Also found to have worsening heart failure now with severely reduced ejection fraction.     #Acute cystitis  #Group B strep >100,000, 10-50,000 pseudomonas in urine  #Functional and urge urinary incontinence  #Recurrent urinary tract infections  Urine infectious with elevated WBC and leuk esterase. CT showed thickened bladder wall consistent with acute cystitis. Patient has been afebrile, no leukocytosis, no sign pyelonephritis on imaging. History of UTIs. Recently started on mirabegron in early July.  Holding mirabegron now as it can cause urinary retention and contribute to UTIs. Urine culture with predominantly group B strep but some pseudomonas. Repeat urinalysis remained with pyuria and repeat culture growing pseudomonas. Was switched to cefepime and now levofloxacin.  -Antibiotic escalated to cefepime due to pseudomonas however switching to levofloxacin now s/p 5 days total of abx   -Pending blood culture no growth to date  - Appreciate urology consultation. Post-void bladder ultrasound desired but this can technically be completed when the patient's time of void can be communicated and known. Attempted Monday but again could not follow commands well enough for post void to be obtained.  - Consider cranberry, vitamin C, and/or methenamine after completion of acute UTI treatment. Outpatient urology follow-up recommended.   - Plan to place liriano 7/25 and get UA, LE large but will wait on cultures    - if  positive will consider talking to ID re suppressive abx     #Acute metabolic encephalopathy due to infection with underlying cognitive impairment  #Long history of multifactorial cognitive impairment (last neurology note Nov 2022)  Head CT negative for bleed or acute pathology. Long history of mild difficulty with memory and work-finding that is often worse with acute illness. VBG with some CO2 retention after missing CPAP but alertness improved during the day. B12 and TSH reassuring. Will switch from cefepime to levofloxacin given underlying cognitive issues. Continues to be more alert however confused when talking.  -Supportive care. Okay to wean  if she is safe.   - Odalis Barraza is roommate/caregiver/POA and helps provide history    Heart Failure with reduced ejection fraction, worsening  BNP elevated to 7,631. HF exacerbation could be contributing to her weakness. No increased dyspnea reported, lungs clear. Is not currently on SGLT2 given her history of UTIS.   -Continue PTA bumex 4mg daily  -Continue PTA Metoprolol succinate 25mg daily  -Continue PTA losartan 25mg daily  -Echo 7/17 showed severely reduced ejection fraction. Cardiology consulted. Recommended to repeat echocardiography prior to discharge and contact cardiology if the ejection fraction does not return to prior baseline.     Acute respiratory failure with hypercapnea and hypoxia, improving  Initially somnolent with blood gas consistent with CO2 retention. She has sleep apnea and is supposed to use CPAP at home but never uses it due to intolerance and taking off the mask.   - VBG after night without CPAP was acceptable. Defer CPAP use since it is not part of her home regimen.   - Repeat blood gas if excessive somnolence occurs.   - Wean oxygen as tolerated    CAD  CAD s/p  PCI x2 to LAD and CABG in 2018   -Continue PTA Aspirin  -lipitor 40mg daily    Diabetes on long-term insulin  -HOLD PTA lantus 32 units daily  -MSSI  -Hypoglycemic  "protocol    Hypothyroidism  -Continue PTA levothyroxine 150mcg    History of gout  - Rechecked uric acid per Odalis's request. Within normal range, no change to plan at this point.     Non cardiac chest pain   Noted chest pain 7/21, which was reproducible over patients known cyst on her left breast. This has been noted in clinic visits as well   - CTM     Constipation   This is a chronic issues per patients care giver odalis.   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 She also enjoys watching birds, watching TV, and visiting with friends and family. Odalis has been her friend and roommate for many years and is her healthcare POA. Mariel's sisters both live out of state but they stay in touch by phone. Mariel enjoys being at home but is dependent for many cares.   Mariel has been full code in the past but she and Odalis are open to considering DNR given interval worsening of multiple health concerns. They would like to discuss this further in the future.           Diet: 2 Gram Sodium Diet    DVT Prophylaxis: Pneumatic Compression Devices  Gutierrez Catheter: Not present  Lines: None     Cardiac Monitoring: None  Code Status: Full Code      Clinically Significant Risk Factors                  # Hypertension: Noted on problem list  # Chronic heart failure with reduced ejection fraction: last echo with EF <40%            # DMII: A1C = 7.6 % (Ref range: <5.7 %) within past 6 months   # Severe Obesity: Estimated body mass index is 46.81 kg/m  as calculated from the following:    Height as of 7/10/24: 1.676 m (5' 6\").    Weight as of 7/10/24: 131.5 kg (290 lb).        # Financial/Environmental Concerns: none   # History of CABG: noted on surgical history       Disposition Plan     Medically Ready for Discharge: Anticipated in 2-4 Days             Aramis Blanc DO  Hospitalist Service, GOLD TEAM 21  [facility] " General acute hospital  Securely message with [employer] (more info)  Text page via [facility] Paging/Directory   See signed in provider for up to date coverage information  ______________________________________________________________________    Interval History     Remained somnolent all day yesterday not really participating in cares much however better today during the morning. Was able to get up with PT and stand and was talking in full sentences. Later in the afternoon more tired however still improved from yesterday and able to answer questions.. Uncertain etiology but suspect this is just her chronic waxing and waning mental status. Given her repeated UTIs and difficulty voiding will get another UA and see what that grows.    Physical Exam   Vital Signs: Temp: (P) 98.3  F (36.8  C) Temp src: (P) Oral BP: (!) (P) 134/119 Pulse: (P) 71   Resp: (P) 19 SpO2: 97 % O2 Device: None (Room air)    Weight: 0 lbs 0 oz    Gen: In bed no distress  Resp: Breathing comfortably on room air   CV: RRR  Abd: Obese  Neuro: Alert but unable to reliably answer questions    Medical Decision Making       55 MINUTES SPENT BY ME on the date of service doing chart review, history, exam, documentation & further activities per the note.      Data     I have personally reviewed the following data over the past 24 hrs:    7.3  \   11.1 (L)   / 169     136 98 41.6 (H) /  135 (H)   3.8 27 1.34 (H) \       Imaging results reviewed over the past 24 hrs:   Recent Results (from the past 24 hour(s))   Echo Limited   Result Value    LVEF  20-25% (severely reduced)    Narrative    [id number]  [id number]  [id number]  [id number]^BJORN^NOELLE     [facility] [place]  Echocardiography [facility]  [address] [place] [id number]     Name: ROSY PALMER  MRN: [id number]  : 1945  Study Date: 2024 01:05 PM  Age: 79 yrs  Gender: Female  [facility]  Reason For Study: Heart Failure -  Left  Ordering Physician: NOELLE MILAN  Performed By: Ivett Fink     BSA: 2.3 m2  Height: 66 in  Weight: 290 lb  HR: 73  BP: 150/87 mmHg  ______________________________________________________________________________  Procedure  Limited Portable Echo Adult.  ______________________________________________________________________________  Interpretation Summary  Severe left ventricular dilation is present.  Left ventricular function is decreased. The ejection fraction is 20-25%  (severely reduced).  Right ventricular function, chamber size, wall motion, and thickness are  normal.  The inferior vena cava is normal.  No pericardial effusion is present.  No significant changes noted.  ______________________________________________________________________________  Left Ventricle  Severe left ventricular dilation is present. Left ventricular function is  decreased. The ejection fraction is 20-25% (severely reduced).     Right Ventricle  Right ventricular function, chamber size, wall motion, and thickness are  normal.     Vessels  The inferior vena cava is normal.     Pericardium  No pericardial effusion is present.     Compared to Previous Study  No significant changes noted.  ______________________________________________________________________________  Doppler Measurements & Calculations  PA acc time: 0.12 sec     ______________________________________________________________________________  Report approved by: Aiden Kang 07/24/2024 03:34 PM

## 2024-07-25 NOTE — PLAN OF CARE
Goal Outcome Evaluation:      Plan of Care Reviewed With: patient    Overall Patient Progress: no changeOverall Patient Progress: no change    Outcome Evaluation: no acute changes during shift, pt slept most of night.

## 2024-07-25 NOTE — PLAN OF CARE
Goal Outcome Evaluation:      Plan of Care Reviewed With: patient, family    Overall Patient Progress: no changeOverall Patient Progress: no change   BP (!) (P) 134/119 (BP Location: Right leg)   Pulse (P) 71   Temp (P) 98.3  F (36.8  C) (Oral)   Resp (P) 19   LMP  (LMP Unknown)   SpO2 97%     Outcome Evaluation: Pt is alert to self, disoriented x 3, took pills with apple sauce, up with PT, assist of 2, log roll, pure wick in. Family at bedside and involve in care Writer attempted to to insert a liriano catheter, but unsuccessful, retried and writer was able  to straight cath 660 ml output, UA collected and sent to lab. staff to anticipate patient needs. Continue with POC

## 2024-07-26 NOTE — PROGRESS NOTES
Care Management Follow Up    Length of Stay (days): 10    Expected Discharge Date: 07/30/2024     Concerns to be Addressed: discharge planning     Patient plan of care discussed at interdisciplinary rounds: Yes    Anticipated Discharge Disposition: Transitional Care     Anticipated Discharge Services:  (Post Acute Therapies)  Anticipated Discharge DME:  (SNF to provide necessary DME)    Patient/family educated on Medicare website which has current facility and service quality ratings: yes  Education Provided on the Discharge Plan: Yes  Patient/Family in Agreement with the Plan: yes    Referrals Placed by CM/SW:      Please see ALEX Smith note for full list of referrals.    Private pay costs discussed: Not applicable    Additional Information:    Per hospitalist, patient no longer medically ready for discharge. Anticipate Monday will have clearer plan of care.    SW/Care management will continue to follow for safe discharge planning.        FRANCISCO Mckenzie, LSW  8 Med Surg   [facility]  Phone: [phone]  Vocera: Searchable by name or group: 8 Med Surg Vocera

## 2024-07-26 NOTE — PROGRESS NOTES
7/26/24  Care Management Follow Up     Pending:     [facility]  [address] [place] [address]  [phone]  7/24: Initial referral sent  7/25: CHW called admissions and LVM requesting a call back. Pt is potentially being reviewed for LTC     [facility]  [address] [place] [address]  PH: [phone]  FAX: [phone]  7/25: Initial referral sent      [facility]  [address] [place] [address]  PH: [phone]  7/25: Initial referral sent      [facility]  Shanika- [place] Liaison  PH: [phone]  FAX: [phone]  7/25: Initial referral sent      Declined:     [facility]  [address] [place] [address]  [phone]   7/24: Initial referral sent. Declined, no solid discharge plan, bariatric needs, behavior issues/concerns     [address] [address]  [phone]  7/24: Initial referral sent. Declined, bed not available, bariatric needs     [facility]  [address] [place]  [address]  P: [phone]  F: [phone]  7/24: Initial referral sent. Declined, no bed available     [facility]  [address] [place] [address]  [phone]   7/24: Initial referral sent  7/25: Declined due to no bed available     [facility]  [address] [facility] [address]  [place] [address]  PH: [phone]   7/25: Initial referral sent   7/26: Declined due to pt needs to go to TCU with LTC attached    [facility]  [address] [place] [address]  PH: [phone]  7/25: Initial referral sent   7/26: Declined due to bed not available and facility full     Sarah Dial  Inpatient CHW  [facility] 5 Ortho, 8 Med Surge, & ED  PH: [phone]

## 2024-07-26 NOTE — PLAN OF CARE
"Goal Outcome Evaluation:  Blood pressure (!) 139/115, pulse 75, temperature 98.8  F (37.1  C), temperature source Oral, resp. rate 18, SpO2 92%, not currently breastfeeding.    Pt alert to self only and confused. Mood neutral. Pt is on RA No SOB noted. Refused assessment and care. Incontinent bowel and bladder and refused incontinent care. Pt refused to be placed liriano catheter. Purwick is on. Pt is not oob and refused to be turned and rigid her whole body. Pt is on RA no SOB noted. Pt finished 50% of her dinner. Bariatic bed with air ordered.      Plan of Care Reviewed With: patient    Problem: Adult Inpatient Plan of Care  Goal: Plan of Care Review  Description: The Plan of Care Review/Shift note should be completed every shift.  The Outcome Evaluation is a brief statement about your assessment that the patient is improving, declining, or no change.  This information will be displayed automatically on your shift  note.  Outcome: Not Progressing  Flowsheets (Taken 7/26/2024 0032)  Plan of Care Reviewed With: patient  Goal: Patient-Specific Goal (Individualized)  Description: You can add care plan individualizations to a care plan. Examples of Individualization might be:  \"Parent requests to be called daily at 9am for status\", \"I have a hard time hearing out of my right ear\", or \"Do not touch me to wake me up as it startles  me\".  Outcome: Not Progressing  Goal: Absence of Hospital-Acquired Illness or Injury  Outcome: Not Progressing  Goal: Optimal Comfort and Wellbeing  Outcome: Not Progressing  Goal: Readiness for Transition of Care  Outcome: Not Progressing     Problem: Risk for Delirium  Goal: Optimal Coping  Outcome: Not Progressing  Goal: Improved Behavioral Control  Outcome: Not Progressing  Goal: Improved Attention and Thought Clarity  Outcome: Not Progressing  Goal: Improved Sleep  Outcome: Not Progressing     Problem: Skin Injury Risk Increased  Goal: Skin Health and Integrity  Outcome: Not " Progressing  Intervention: Plan: Nurse Driven Intervention: Moisture Management  Recent Flowsheet Documentation  Taken 7/25/2024 2000 by Karla Jacobo, RN  Moisture Interventions: Incontinence pad  Bathing/Skin Care: incontinence care

## 2024-07-26 NOTE — PROGRESS NOTES
[facility]    Medicine Progress Note - Hospitalist Service, GOLD TEAM 21    Date of Admission:  7/16/2024    Assessment & Plan   Mariel Ribeiro is a 79 year old female past medical history significant for morbid obesity, COPD, migraines, Hfpef, history of CABG x 3, history of type 2 diabetes with stage III CKD, recurrent falls who presented with concerns for generalized weakness. admitted on 7/16/2024 for altered mental status possibly secondary to acute cystitis. Also found to have worsening heart failure now with severely reduced ejection fraction.     #Acute cystitis  #Group B strep >100,000, 10-50,000 pseudomonas in urine  #Functional and urge urinary incontinence  #Recurrent urinary tract infections  Urine infectious with elevated WBC and leuk esterase. CT showed thickened bladder wall consistent with acute cystitis. Patient has been afebrile, no leukocytosis, no sign pyelonephritis on imaging. History of UTIs. Recently started on mirabegron in early July.  Holding mirabegron now as it can cause urinary retention and contribute to UTIs. Urine culture with predominantly group B strep but some pseudomonas. Repeat urinalysis remained with pyuria and repeat culture growing pseudomonas. Was switched to cefepime and now levofloxacin.  -Antibiotic escalated to cefepime due to pseudomonas however switching to levofloxacin now s/p 5 days total of abx   -Pending blood culture no growth to date  - Appreciate urology consultation. Post-void bladder ultrasound desired but this can technically be completed when the patient's time of void can be communicated and known. Attempted Monday but again could not follow commands well enough for post void to be obtained.  - Consider cranberry, vitamin C, and/or methenamine after completion of acute UTI treatment. Outpatient urology follow-up recommended.   - UA 7/25 LE large but will wait on cultures    - if positive will consider talking  to ID re suppressive abx     #Acute metabolic encephalopathy due to infection with underlying cognitive impairment  #Long history of multifactorial cognitive impairment (last neurology note Nov 2022)  Head CT negative for bleed or acute pathology. Long history of mild difficulty with memory and work-finding that is often worse with acute illness. VBG with some CO2 retention after missing CPAP but alertness improved during the day. B12 and TSH reassuring. Will switch from cefepime to levofloxacin given underlying cognitive issues. Continues to be more alert however confused when talking.  -Supportive care. Okay to wean  if she is safe.   - Odalis Barraza is roommate/caregiver/POA and helps provide history    Heart Failure with reduced ejection fraction, worsening  BNP elevated to 7,631. HF exacerbation could be contributing to her weakness. No increased dyspnea reported, lungs clear. Is not currently on SGLT2 given her history of UTIS. Repeat TTE done on 7/24 with continued reduced EF.   -Continue PTA bumex 4mg daily  -Continue PTA Metoprolol succinate 25mg daily  -Continue PTA losartan 25mg daily  -Echo 7/17 showed severely reduced ejection fraction. Cardiology consulted. Recommended to repeat echocardiography prior to discharge and contact cardiology if the ejection fraction does not return to prior baseline.     Acute respiratory failure with hypercapnea and hypoxia, improving  Initially somnolent with blood gas consistent with CO2 retention. She has sleep apnea and is supposed to use CPAP at home but never uses it due to intolerance and taking off the mask.   - VBG after night without CPAP was acceptable. Defer CPAP use since it is not part of her home regimen.   - Repeat blood gas if excessive somnolence occurs.   - Wean oxygen as tolerated    CAD  CAD s/p  PCI x2 to LAD and CABG in 2018   -Continue PTA Aspirin  -lipitor 40mg daily    Diabetes on long-term insulin  -HOLD PTA lantus 32 units  "daily  -MSSI  -Hypoglycemic protocol    Hypothyroidism  -Continue PTA levothyroxine 150mcg    History of gout  - Rechecked uric acid per Odalis's request. Within normal range, no change to plan at this point.     Non cardiac chest pain   Noted chest pain 7/21, which was reproducible over patients known cyst on her left breast. This has been noted in clinic visits as well   - CTM     Constipation   This is a chronic issues per patients care giver odalis.   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 She also enjoys watching birds, watching TV, and visiting with friends and family. Odalis has been her friend and roommate for many years and is her healthcare POA. Mariel's sisters both live out of state but they stay in touch by phone. Mariel enjoys being at home but is dependent for many cares.   Mariel has been full code in the past but she and Odalis are open to considering DNR given interval worsening of multiple health concerns. They would like to discuss this further in the future.           Diet: 2 Gram Sodium Diet    DVT Prophylaxis: Pneumatic Compression Devices  Gutierrez Catheter: Not present  Lines: None     Cardiac Monitoring: None  Code Status: Full Code      Clinically Significant Risk Factors                  # Hypertension: Noted on problem list  # Chronic heart failure with reduced ejection fraction: last echo with EF <40%            # DMII: A1C = 7.6 % (Ref range: <5.7 %) within past 6 months   # Severe Obesity: Estimated body mass index is 46.81 kg/m  as calculated from the following:    Height as of 7/10/24: 1.676 m (5' 6\").    Weight as of 7/10/24: 131.5 kg (290 lb).        # Financial/Environmental Concerns: none   # History of CABG: noted on surgical history       Disposition Plan     Medically Ready for Discharge: Anticipated in 2-4 Days             Aramis Blanc DO  Hospitalist Service, GOLD TEAM " 21  [facility]  Securely message with [facility] [facility] (more info)  Text page via [employer] Paging/Directory   See signed in provider for up to date coverage information  ______________________________________________________________________    Interval History     No acute events overnight. Patient refused liriano. Otherwise this morning a little more somnolent than yesterday but still answering questions and talking. Waiting on urine culture results.    Physical Exam   Vital Signs: Temp: 97.7  F (36.5  C) Temp src: Oral BP: 128/72 Pulse: 64   Resp: 16 SpO2: 95 % O2 Device: None (Room air)    Weight: 0 lbs 0 oz    Gen: In bed no distress  Resp: Breathing comfortably on room air   CV: RRR  Abd: Obese  Neuro: Alert but unable to reliably answer questions    Medical Decision Making       45 MINUTES SPENT BY ME on the date of service doing chart review, history, exam, documentation & further activities per the note.      Data     I have personally reviewed the following data over the past 24 hrs:    6.3  \   11.8   / 137 (L)     137 98 43.7 (H) /  143 (H)   3.6 27 1.37 (H) \       Imaging results reviewed over the past 24 hrs:   No results found for this or any previous visit (from the past 24 hour(s)).

## 2024-07-26 NOTE — PLAN OF CARE
Goal Outcome Evaluation:    Plan of Care Reviewed With: patient  Overall Patient Progress: no change  Outcome Evaluation: Pt A&O x1 (to self) and has been sleeping majority of shift.    O2: Stable On RA. No cough and denies SOB.    Output: Incontinent of bowel and bladder.   Last BM: 7/25/2023.    Activity: Not OOB during shift. Assist of 2 w/ ceiling lift.   Skin: Redness under breast folds, redness in ralph area. Preventative mepilex on sacrum and Interdry in folds.   Pain: 1/10 managed with scheduled Voltaren gel.   Neuro: A&Ox1 (oriented to self). Denies numbness and tingling.   Diet: Low sodium diet. Denies N/V   LDA: R PIV SL.   Equipment: Personal items,    Plan: Continue POC.

## 2024-07-27 NOTE — PROGRESS NOTES
[facility]    Medicine Progress Note - Hospitalist Service, GOLD TEAM 21    Date of Admission:  7/16/2024    Assessment & Plan   Mariel Ribeiro is a 79 year old female past medical history significant for morbid obesity, COPD, migraines, Hfpef, history of CABG x 3, history of type 2 diabetes with stage III CKD, recurrent falls who presented with concerns for generalized weakness. admitted on 7/16/2024 for altered mental status possibly secondary to acute cystitis. Also found to have worsening heart failure now with severely reduced ejection fraction. Patients mental status improved with abx however now has waxing and waning mental status.     #Acute cystitis  #Group B strep >100,000, 10-50,000 pseudomonas in urine  #Functional and urge urinary incontinence  #Recurrent urinary tract infections  Urine infectious with elevated WBC and leuk esterase. CT showed thickened bladder wall consistent with acute cystitis. Patient has been afebrile, no leukocytosis, no sign pyelonephritis on imaging. History of UTIs. Recently started on mirabegron in early July.  Holding mirabegron now as it can cause urinary retention and contribute to UTIs. Urine culture with predominantly group B strep but some pseudomonas. Repeat urinalysis remained with pyuria and repeat culture growing pseudomonas. Was switched to cefepime and now levofloxacin.  -Antibiotic escalated to cefepime due to pseudomonas however switching to levofloxacin now s/p 5 days total of abx   -Pending blood culture no growth to date  - Appreciate urology consultation. Post-void bladder ultrasound desired but this can technically be completed when the patient's time of void can be communicated and known. Attempted Monday but again could not follow commands well enough for post void to be obtained.  - Consider cranberry, vitamin C, and/or methenamine after completion of acute UTI treatment. Outpatient urology follow-up  recommended.   - UA 7/25 LE large but cultures negative     #Acute metabolic encephalopathy due to infection with underlying cognitive impairment  #Long history of multifactorial cognitive impairment (last neurology note Nov 2022)  Head CT negative for bleed or acute pathology. Long history of mild difficulty with memory and work-finding that is often worse with acute illness. VBG with some CO2 retention after missing CPAP but alertness improved during the day. B12 and TSH reassuring. Will switch from cefepime to levofloxacin given underlying cognitive issues. Continues to be more alert however confused when talking.  -Supportive care. Okay to wean  if she is safe.   - Odalis Barraza is roommate/caregiver/POA and helps provide history    Heart Failure with reduced ejection fraction, worsening  BNP elevated to 7,631. HF exacerbation could be contributing to her weakness. No increased dyspnea reported, lungs clear. Is not currently on SGLT2 given her history of UTIS. Repeat TTE done on 7/24 with continued reduced EF.   -Continue PTA bumex 4mg daily  -Continue PTA Metoprolol succinate 25mg daily  -Continue PTA losartan 25mg daily  -Echo 7/17 showed severely reduced ejection fraction. Cardiology consulted. Recommended to repeat echocardiography prior to discharge and contact cardiology if the ejection fraction does not return to prior baseline. Will discuss with cardiology when closer to discharge      Acute respiratory failure with hypercapnea and hypoxia, improving  Initially somnolent with blood gas consistent with CO2 retention. She has sleep apnea and is supposed to use CPAP at home but never uses it due to intolerance and taking off the mask.   - VBG after night without CPAP was acceptable. Defer CPAP use since it is not part of her home regimen.   - Repeat blood gas if excessive somnolence occurs.   - Wean oxygen as tolerated    CAD  CAD s/p  PCI x2 to LAD and CABG in 2018   -Continue PTA  "Aspirin  -lipitor 40mg daily    Diabetes on long-term insulin  -HOLD PTA lantus 32 units daily  -MSSI  -Hypoglycemic protocol    Hypothyroidism  -Continue PTA levothyroxine 150mcg    History of gout  - Rechecked uric acid per Odalis's request. Within normal range, no change to plan at this point.     Non cardiac chest pain   Noted chest pain 7/21, which was reproducible over patients known cyst on her left breast. This has been noted in clinic visits as well   - CTM     Constipation   This is a chronic issues per patients care giver odalis.   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 She also enjoys watching birds, watching TV, and visiting with friends and family. Odalis has been her friend and roommate for many years and is her healthcare POA. Mariel's sisters both live out of state but they stay in touch by phone. Mariel enjoys being at home but is dependent for many cares.   Mariel has been full code in the past but she and Odalis are open to considering DNR given interval worsening of multiple health concerns. They would like to discuss this further in the future.           Diet: 2 Gram Sodium Diet    DVT Prophylaxis: Pneumatic Compression Devices  Gutierrez Catheter: Not present  Lines: None     Cardiac Monitoring: None  Code Status: Full Code      Clinically Significant Risk Factors                  # Hypertension: Noted on problem list  # Chronic heart failure with reduced ejection fraction: last echo with EF <40%            # DMII: A1C = 7.6 % (Ref range: <5.7 %) within past 6 months   # Severe Obesity: Estimated body mass index is 46.81 kg/m  as calculated from the following:    Height as of 7/10/24: 1.676 m (5' 6\").    Weight as of 7/10/24: 131.5 kg (290 lb).        # Financial/Environmental Concerns: none   # History of CABG: noted on surgical history       Disposition Plan     Medically Ready for " Discharge: Anticipated in 2-4 Days             Aramis Blanc DO  Hospitalist Service, GOLD TEAM 21  [facility]  Securely message with [employer] (more info)  Text page via [email or web address] Paging/Directory   See signed in provider for up to date coverage information  ______________________________________________________________________    Interval History     No acute events overnight. Doing ok this morning, answering questions, no new issues, no new symptoms. Cultures from UA negative.    Physical Exam   Vital Signs: Temp: 98.4  F (36.9  C) Temp src: Axillary BP: 109/82 Pulse: 72   Resp: 16 SpO2: 93 % O2 Device: None (Room air)    Weight: 0 lbs 0 oz    Gen: In bed no distress  Resp: Breathing comfortably on room air   CV: RRR  Abd: Obese  Neuro: Alert but unable to reliably answer questions    Medical Decision Making       45 MINUTES SPENT BY ME on the date of service doing chart review, history, exam, documentation & further activities per the note.      Data     I have personally reviewed the following data over the past 24 hrs:    6.7  \   11.4 (L)   / 163     137 100 44.3 (H) /  169 (H)   3.6 26 1.35 (H) \       Imaging results reviewed over the past 24 hrs:   No results found for this or any previous visit (from the past 24 hour(s)).

## 2024-07-27 NOTE — PLAN OF CARE
Shift 0700 to 1930:    Goal Outcome Evaluation:      Plan of Care Reviewed With: patient    Overall Patient Progress: no change    Outcome Evaluation: Pt A&Ox1 (self), slept most of shift.    Pt denies SOB, CP, nausea, vomiting, diarrhea, constipation, numbness, tingling, and pain. Pt incontinent of B&B, LBM 7/25. 2 gram Na+ diet, tray set up, takes medications whole in applesauce, drinks thin liquids. R PIV SL. BG checks QID, sliding scale insulin. Skin intact except for redness to ralph area and bruising to abd. Ax2 for cares, lift for transfers. Pt has call light in reach, calls appropriately, and has no unanswered questions or concerns at end of shift. Continue POC.

## 2024-07-27 NOTE — PLAN OF CARE
A/Ox1. Oriented to self.   Assist x 2 w/ rudi steady or ceiling lift.  Low sodium diet  Takes medications whole in applesauce  Incontinent B/B. Purewick in place. LBM 7/25  Redness to skin folds and ralph area.   Mepilex on sacrum/coccyx for prevention    Call light within reach, will continue to monitor and follow POC

## 2024-07-28 NOTE — PLAN OF CARE
Shift: [id number]    A/Ox1. Oriented to self  2 L O2 via NC.   Assist x2 w/ cares  Incontinent bowels. LBM 7/25  Purewick in place  Not OOB this shift  Regular diet  Takes medications whole in applesauce  R PIV SL.   Scattered bruises on arms, hands, and abdomen.    Call light within reach, will continue to monitor and follow POC

## 2024-07-28 NOTE — PLAN OF CARE
[id number]  Blood pressure (!) 122/96, pulse 69, temperature 98.8  F (37.1  C), temperature source Axillary, resp. rate 18, SpO2 96%, not currently breastfeeding.    Goal Outcome Evaluation:  Pt alert to self only. Confusion. Pt slept during this shift. 2 L NC however pt pulled out her NC intermittently. Skin has some bruises see LDA flowsheet. Bed bath and incontinent care done. Incontinent bowel and bladder. Pt is on Purwick. No BM during this shift.

## 2024-07-28 NOTE — PROGRESS NOTES
[facility]    Medicine Progress Note - Hospitalist Service, GOLD TEAM 21    Date of Admission:  7/16/2024    Assessment & Plan   Mariel Ribeiro is a 79 year old female past medical history significant for morbid obesity, COPD, migraines, Hfpef, history of CABG x 3, history of type 2 diabetes with stage III CKD, recurrent falls who presented with concerns for generalized weakness. admitted on 7/16/2024 for altered mental status possibly secondary to acute cystitis. Also found to have worsening heart failure now with severely reduced ejection fraction.     #Acute cystitis  #Group B strep >100,000, 10-50,000 pseudomonas in urine  #Functional and urge urinary incontinence  #Recurrent urinary tract infections  Urine infectious with elevated WBC and leuk esterase. CT showed thickened bladder wall consistent with acute cystitis. Patient has been afebrile, no leukocytosis, no sign pyelonephritis on imaging. History of UTIs. Recently started on mirabegron in early July.  Holding mirabegron now as it can cause urinary retention and contribute to UTIs. Urine culture with predominantly group B strep but some pseudomonas. Repeat urinalysis remained with pyuria and repeat culture growing pseudomonas. Was switched to cefepime and now levofloxacin s/p 5 days total abx.  - Appreciate urology consultation. Post-void bladder ultrasound desired but this can technically be completed when the patient's time of void can be communicated and known. Attempted Monday but again could not follow commands well enough for post void to be obtained.  - Consider cranberry, vitamin C, and/or methenamine after completion of acute UTI treatment. Outpatient urology follow-up recommended.   - UA 7/25 LE large but cultures negative so no treatment at this time     #Acute metabolic encephalopathy due to infection with underlying cognitive impairment  #Long history of multifactorial cognitive impairment (last  neurology note Nov 2022)  Head CT negative for bleed or acute pathology. Long history of mild difficulty with memory and work-finding that is often worse with acute illness. VBG with some CO2 retention after missing CPAP but alertness improved during the day. B12 and TSH reassuring. Will switch from cefepime to levofloxacin given underlying cognitive issues. Continues to be more alert however confused when talking.  -Supportive care.  - Odalis Barraza is roommate/caregiver/POA and helps provide history    Heart Failure with reduced ejection fraction, worsening  BNP elevated to 7,631. HF exacerbation could be contributing to her weakness. No increased dyspnea reported, lungs clear. Is not currently on SGLT2 given her history of UTIS. Echo 7/17 showed severely reduced ejection fraction. Cardiology consulted and recommended repeat TTE when closer to discharge which was done 7/24 and still with reduced EF. Plan to discuss with cardiology on Monday.    -Continue PTA bumex 4mg daily  -Continue PTA Metoprolol succinate 25mg daily  -Continue PTA losartan 25mg daily    Acute respiratory failure with hypercapnea and hypoxia, improving  Initially somnolent with blood gas consistent with CO2 retention. She has sleep apnea and is supposed to use CPAP at home but never uses it due to intolerance and taking off the mask.   - VBG after night without CPAP was acceptable. Defer CPAP use since it is not part of her home regimen.   - Repeat blood gas if excessive somnolence occurs.   - Wean oxygen as tolerated    CAD  CAD s/p  PCI x2 to LAD and CABG in 2018   -Continue PTA Aspirin  -lipitor 40mg daily    Diabetes on long-term insulin  -PTA lantus 32 units daily now on 5 units daily   -MSSI  -Hypoglycemic protocol    Hypothyroidism  -Continue PTA levothyroxine 150mcg    History of gout  - Rechecked uric acid per Odalis's request. Within normal range, no change to plan at this point.     Non cardiac chest pain   Noted chest pain 7/21, which  "was reproducible over patients known cyst on her left breast. This has been noted in clinic visits as well   - CTM     Constipation   This is a chronic issues per patients care giver odalis.   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 She also enjoys watching birds, watching TV, and visiting with friends and family. Odalis has been her friend and roommate for many years and is her healthcare POA. Mariel's sisters both live out of state but they stay in touch by phone. Mariel enjoys being at home but is dependent for many cares.   Mariel has been full code in the past but she and Odalis are open to considering DNR given interval worsening of multiple health concerns. They would like to discuss this further in the future.           Diet: 2 Gram Sodium Diet    DVT Prophylaxis: Pneumatic Compression Devices  Gutierrez Catheter: Not present  Lines: None     Cardiac Monitoring: None  Code Status: Full Code      Clinically Significant Risk Factors                  # Hypertension: Noted on problem list  # Chronic heart failure with reduced ejection fraction: last echo with EF <40%            # DMII: A1C = 7.6 % (Ref range: <5.7 %) within past 6 months   # Severe Obesity: Estimated body mass index is 46.81 kg/m  as calculated from the following:    Height as of 7/10/24: 1.676 m (5' 6\").    Weight as of 7/10/24: 131.5 kg (290 lb).        # Financial/Environmental Concerns: none   # History of CABG: noted on surgical history       Disposition Plan     Medically Ready for Discharge: Anticipated in 2-4 Days             Aramis Blanc DO  Hospitalist Service, GOLD TEAM [address] [place] [address]  Securely message with [employer] (more info)  Text page via [facility] Paging/Directory   See signed in provider for up to date coverage information  ______________________________________________________________________    Interval " History     No acute events overnight. Patients friend Odalis is here this morning and Mariel is much more alert and interactive, asking to eat breakfast. Seems like she just takes extra coaxing to get up in the morning and Odalis is good at that and cheers her up. Given she has been slightly up and down mentally but mostly up I feel she is ready  for TCU.    Physical Exam   Vital Signs: Temp: 98.3  F (36.8  C) Temp src: Axillary BP: (!) 145/93 Pulse: 64   Resp: 16 SpO2: 99 % O2 Device: None (Room air) Oxygen Delivery: 2 LPM  Weight: 0 lbs 0 oz    Gen: In bed no distress  Resp: Breathing comfortably on room air   CV: RRR  Abd: Obese  Neuro: Alert but unable to reliably answer questions    Medical Decision Making       45 MINUTES SPENT BY ME on the date of service doing chart review, history, exam, documentation & further activities per the note.      Data     I have personally reviewed the following data over the past 24 hrs:    6.8  \   11.1 (L)   / 181     141 102 44.4 (H) /  156 (H)   3.9 28 1.34 (H) \       Imaging results reviewed over the past 24 hrs:   No results found for this or any previous visit (from the past 24 hour(s)).

## 2024-07-28 NOTE — PROGRESS NOTES
BP (!) 145/93 (BP Location: Right arm)   Pulse 64   Temp 98.3  F (36.8  C) (Axillary)   Resp 16   LMP  (LMP Unknown)   SpO2 99%     Pt AO to self, on 2 LPM Oxygen via NC. Blood sugar checks and Insulin per sliding scale. Pt Assist x2 with ceiling lift for cares, Incontinent of bowel, PureWick to wall suction. R PIV SL.  No new events this shift. Continue to monitor Pt and follow the POC,

## 2024-07-29 NOTE — PROGRESS NOTES
[facility]    Medicine Progress Note - Hospitalist Service, GOLD TEAM 21    Date of Admission:  7/16/2024    Assessment & Plan   Mariel Ribeiro is a 79 year old female past medical history significant for morbid obesity, COPD, migraines, Hfpef, history of CABG x 3, history of type 2 diabetes with stage III CKD, recurrent falls who presented with concerns for generalized weakness. admitted on 7/16/2024 for altered mental status possibly secondary to acute cystitis. Also found to have worsening heart failure now with severely reduced ejection fraction. Patient was treated with abx for UTI with moderate improvement in mental status. Stay complicated by waxing and waning mental status that is slowly improving. Patient stable for discharge to TCU.    #Acute cystitis  #Group B strep >100,000, 10-50,000 pseudomonas in urine  #Functional and urge urinary incontinence  #Recurrent urinary tract infections  Urine infectious with elevated WBC and leuk esterase. CT showed thickened bladder wall consistent with acute cystitis. Patient has been afebrile, no leukocytosis, no sign pyelonephritis on imaging. History of UTIs. Recently started on mirabegron in early July.  Holding mirabegron now as it can cause urinary retention and contribute to UTIs. Urine culture with predominantly group B strep but some pseudomonas. Repeat urinalysis remained with pyuria and repeat culture growing pseudomonas. Was switched to cefepime and now levofloxacin s/p 5 days total abx.  - Appreciate urology consultation. Post-void bladder ultrasound desired but this can technically be completed when the patient's time of void can be communicated and known. Attempted Monday but again could not follow commands well enough for post void to be obtained.  - Consider cranberry, vitamin C, and/or methenamine after completion of acute UTI treatment. Outpatient urology follow-up recommended.   - UA 7/25 LE large but  cultures negative so no treatment at this time     #Acute metabolic encephalopathy due to infection with underlying cognitive impairment  #Long history of multifactorial cognitive impairment (last neurology note Nov 2022)  Head CT negative for bleed or acute pathology. Long history of mild difficulty with memory and work-finding that is often worse with acute illness. VBG with some CO2 retention after missing CPAP but alertness improved during the day. B12 and TSH reassuring. Will switch from cefepime to levofloxacin given underlying cognitive issues. Continues to be more alert however confused when talking.  -Supportive care.  - Odalis Barraza is roommate/caregiver/POA and helps provide history    Heart Failure with reduced ejection fraction, worsening  BNP elevated to 7,631. HF exacerbation could be contributing to her weakness. No increased dyspnea reported, lungs clear. Is not currently on SGLT2 given her history of UTIS. Echo 7/17 showed severely reduced ejection fraction. Cardiology consulted and recommended repeat TTE when closer to discharge which was done 7/24 and still with reduced EF. Plan to discuss with cardiology on Monday.    -Continue PTA bumex 4mg daily  -Continue PTA Metoprolol succinate 25mg daily  -Continue PTA losartan 25mg daily    Acute respiratory failure with hypercapnea and hypoxia, improving  Initially somnolent with blood gas consistent with CO2 retention. She has sleep apnea and is supposed to use CPAP at home but never uses it due to intolerance and taking off the mask.   - VBG after night without CPAP was acceptable. Defer CPAP use since it is not part of her home regimen.   - Repeat blood gas if excessive somnolence occurs.   - Wean oxygen as tolerated    CAD  CAD s/p  PCI x2 to LAD and CABG in 2018   -Continue PTA Aspirin  -lipitor 40mg daily    Diabetes on long-term insulin  -PTA lantus 32 units daily now on 5 units daily   -MSSI  -Hypoglycemic protocol    Hypothyroidism  -Continue PTA  "levothyroxine 150mcg    History of gout  - Rechecked uric acid per Odalis's request. Within normal range, no change to plan at this point.     Non cardiac chest pain   Noted chest pain 7/21, which was reproducible over patients known cyst on her left breast. This has been noted in clinic visits as well   - CTM     Constipation   This is a chronic issues per patients care giver odalis.   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 She also enjoys watching birds, watching TV, and visiting with friends and family. Odalis has been her friend and roommate for many years and is her healthcare POA. Mariel's sisters both live out of state but they stay in touch by phone. Mariel enjoys being at home but is dependent for many cares.   Mariel has been full code in the past but she and Odalis are open to considering DNR given interval worsening of multiple health concerns. They would like to discuss this further in the future.           Diet: 2 Gram Sodium Diet    DVT Prophylaxis: Pneumatic Compression Devices  Gutierrez Catheter: Not present  Lines: None     Cardiac Monitoring: None  Code Status: Full Code      Clinically Significant Risk Factors                  # Hypertension: Noted on problem list  # Chronic heart failure with reduced ejection fraction: last echo with EF <40%            # DMII: A1C = 7.6 % (Ref range: <5.7 %) within past 6 months   # Severe Obesity: Estimated body mass index is 46.81 kg/m  as calculated from the following:    Height as of 7/10/24: 1.676 m (5' 6\").    Weight as of 7/10/24: 131.5 kg (290 lb).        # Financial/Environmental Concerns: none   # History of CABG: noted on surgical history       Disposition Plan     Medically Ready for Discharge: Anticipated in 2-4 Days             Aramis Blanc DO  Hospitalist Service, GOLD TEAM [address] [place] [address]  Securely message with [employer] " (more info)  Text page via [facility] Paging/Directory   See signed in provider for up to date coverage information  ______________________________________________________________________    Interval History     No acute events overnight. Doing well this morning was able to work with therapy and stand by bedside. Updated friend geeta regarding possible discharge to TCU soon. Medically ready.    Physical Exam   Vital Signs: Temp: 98.4  F (36.9  C) Temp src: Axillary BP: 114/78 Pulse: 73   Resp: 18 SpO2: 98 % O2 Device: Nasal cannula Oxygen Delivery: 2 LPM  Weight: 0 lbs 0 oz    Gen: In bed no distress  Resp: Breathing comfortably on room air   CV: RRR  Abd: Obese  Neuro: Alert but unable to reliably answer questions    Medical Decision Making       45 MINUTES SPENT BY ME on the date of service doing chart review, history, exam, documentation & further activities per the note.      Data     I have personally reviewed the following data over the past 24 hrs:    7.0  \   11.1 (L)   / 223     N/A N/A N/A /  149 (H)   N/A N/A N/A \       Imaging results reviewed over the past 24 hrs:   No results found for this or any previous visit (from the past 24 hour(s)).

## 2024-07-29 NOTE — PROGRESS NOTES
Care Management Discharge Note    Discharge Date: 07/29/2024       Discharge Disposition: Transitional Care    [facility] at [address] [address]  [phone]  [facility] Liaison - Judie Wayne  P: [phone]  F: [phone]  Email: [email or web address]    Discharge Services:  (Post Acute Therapies)    Discharge DME:  (SNF to provide necessary DME)    Discharge Transportation:  ([facility] (P: [phone]) stretcher ride scheduled with window of [phone]. Patient's healthcare agent, Odalis, is agreeable to possible out of pocket costs.)    Private pay costs discussed: transportation costs    Does the patient's insurance plan have a 3 day qualifying hospital stay waiver?  No    PAS Confirmation Code: [id number]  Patient/family educated on Medicare website which has current facility and service quality ratings: yes    Education Provided on the Discharge Plan: Yes  Persons Notified of Discharge Plans: bedside nurse, charge nurse, patient, provider, facility, friend/healthcare agent  Patient/Family in Agreement with the Plan: yes    Handoff Referral Completed: Yes    Additional Information:    Patient accepted to [facility] [place] for this afternoon/evening.     [facility] called and spoke with patient's healthcare agent, Odalis. She is agreeable to discharge today to [facility] [place]. Discussed transportation options and Odalis opted for [facility] stretcher ride. She is agreeable to possible out of pocket costs.    MARCO informed patient at bedside of discharge plan. Informed her that Odalis will be meeting her there this evening.    CHW to file PAS and schedule stretcher ride.    2:51 PM  Orders sent to facility via Epic    Ambulance PCS completed, faxed to [facility] billing, and placed in hard chart.          Maribeth Prieto, SONIAW, LSW  8 Med Surg   [facility]  Phone: [phone]  Vocera: Searchable by name or group: 8 Med Surg Vocera

## 2024-07-29 NOTE — PLAN OF CARE
Goal Outcome Evaluation:  VS: /87 (BP Location: Right arm)   Pulse 83   Temp 98.8  F (37.1  C) (Axillary)   Resp 16   LMP  (LMP Unknown)   SpO2 98%     O2: SpO2 98%   Neuro: Alert to self but disoriented x 3   Bowel/Bladder: Incontinent of bowel  and bladder/ pure wick in use    LBM:  N/A   Diet: Regular    Skin: ecchymosis color on hands/ wrist and arms   Pain: No pain reported today    Activity: Bed bound    Dressings: No incisional dressings    LDA: Right PIV    Equipment: Belongings with pt    Plan:  Stretcher ride scheduled with window of [phone].   To be d/jarad to TCU    Additional Info: old female past medical history significant for morbid obesity, COPD, migraines, /worsening heart failure now with severely reduced ejection fraction

## 2024-07-29 NOTE — CONSULTS
"SPIRITUAL HEALTH SERVICES Consult Note   [facility] ([facility])  8A med/surg    Mariel shared that \"I am feeling better today and mentioned that in couples of days I am heading to TCU for additional therapies that would be helpful for me\". Her caregiver Odalis requested the staff at [facility] to offer prayers for Mariel, and Mariel  asked for more follow-up care at TCU. The [facility] staff also offered the services of a [facility]  if needed, to which Mariel responded, \"I am good with every , I just need prayers only.\"      Nesha Rojo    Intern   Pager [phone]  Reachable via [employer]   * [facility] remains available 24/7 for emergent requests/referrals, either by having the switchboard page the on-call  or by entering an ASAP/STAT consult in Epic (this will also page the on-call ). Routine Epic consults receive an initial response within 24 hours.*     "

## 2024-07-29 NOTE — PLAN OF CARE
Shift: [id number]    A/Ox1. (Self only).   Continuous pulse oximetry  2 gram sodium diet  Takes medications whole w/ applesauce  Incontinent bowel. LBM 7/25  Purewick in place  R PIV SL    Call light within reach, will continue to monitor and follow POC

## 2024-07-29 NOTE — DISCHARGE SUMMARY
"[facility]  Hospitalist Discharge Summary      Date of Admission:  7/16/2024  Date of Discharge:  7/29/2024  9:25 PM  Discharging Provider: Aramis Blanc DO  Discharge Service: Hospitalist Service, GOLD TEAM 21    Discharge Diagnoses     #Acute cystitis  #Group B strep >100,000, 10-50,000 pseudomonas in urine  #Functional and urge urinary incontinence  #Recurrent urinary tract infections  #Acute metabolic encephalopathy due to infection with underlying cognitive impairment  #Long history of multifactorial cognitive impairment (last neurology note Nov 2022)  Heart Failure with reduced ejection fraction, worsening  Acute respiratory failure with hypercapnea and hypoxia, improving  CAD  Diabetes on long-term insulin  Hypothyroidism  History of gout  Non cardiac chest pain   Constipation   Social/goals of care    Clinically Significant Risk Factors     # DMII: A1C = 7.6 % (Ref range: <5.7 %) within past 6 months  # Severe Obesity: Estimated body mass index is 46.81 kg/m  as calculated from the following:    Height as of 7/10/24: 1.676 m (5' 6\").    Weight as of 7/10/24: 131.5 kg (290 lb).       Follow-ups Needed After Discharge   Follow-up Appointments     Follow Up and recommended labs and tests      Follow up with halfway physician.  The following labs/tests are   recommended: BMP, CBC.            Unresulted Labs Ordered in the Past 30 Days of this Admission       No orders found from 6/16/2024 to 7/17/2024.        These results will be followed up by NA    Discharge Disposition   Discharged to rehabilitation facility  Condition at discharge: Stable    Hospital Course     Mariel Ribeiro is a 79 year old female past medical history significant for morbid obesity, COPD, migraines, Hfpef, history of CABG x 3, history of type 2 diabetes with stage III CKD, recurrent falls who presented with concerns for generalized weakness. admitted on 7/16/2024 for altered mental status " possibly secondary to acute cystitis. Also found to have worsening heart failure now with severely reduced ejection fraction. Patient was treated with abx for UTI with moderate improvement in mental status. Stay complicated by waxing and waning mental status that is slowly improving. Patient stable for discharge to TCU.     #Acute cystitis  #Group B strep >100,000, 10-50,000 pseudomonas in urine  #Functional and urge urinary incontinence  #Recurrent urinary tract infections  Urine infectious with elevated WBC and leuk esterase. CT showed thickened bladder wall consistent with acute cystitis. Patient has been afebrile, no leukocytosis, no sign pyelonephritis on imaging. History of UTIs. Recently started on mirabegron in early July.  Holding mirabegron now as it can cause urinary retention and contribute to UTIs. Urine culture with predominantly group B strep but some pseudomonas. Repeat urinalysis remained with pyuria and repeat culture growing pseudomonas. Was switched to cefepime and now levofloxacin s/p 5 days total abx.  - Appreciate urology consultation. Post-void bladder ultrasound desired but this can technically be completed when the patient's time of void can be communicated and known. Attempted Monday but again could not follow commands well enough for post void to be obtained.  - Consider cranberry, vitamin C, and/or methenamine after completion of acute UTI treatment. Outpatient urology follow-up recommended.   - UA 7/25 LE large but cultures negative so no treatment at this time      #Acute metabolic encephalopathy due to infection with underlying cognitive impairment  #Long history of multifactorial cognitive impairment (last neurology note Nov 2022)  Head CT negative for bleed or acute pathology. Long history of mild difficulty with memory and work-finding that is often worse with acute illness. VBG with some CO2 retention after missing CPAP but alertness improved during the day. B12 and TSH  reassuring. Will switch from cefepime to levofloxacin given underlying cognitive issues. Continues to be more alert however confused when talking.  -Supportive care.  - Odalsi Barraza is roommate/caregiver/POA and helps provide history     Heart Failure with reduced ejection fraction, worsening  BNP elevated to 7,631. HF exacerbation could be contributing to her weakness. No increased dyspnea reported, lungs clear. Is not currently on SGLT2 given her history of UTIS. Echo 7/17 showed severely reduced ejection fraction. Cardiology consulted and recommended repeat TTE when closer to discharge which was done 7/24 and still with reduced EF. Discussed with cardiology will have patient follow up in [facility].  -Continue PTA bumex 4mg daily  -Continue PTA Metoprolol succinate 25mg daily  -Continue PTA losartan 25mg daily  - Follow up in [facility]     Acute respiratory failure with hypercapnea and hypoxia, improving  Initially somnolent with blood gas consistent with CO2 retention. She has sleep apnea and is supposed to use CPAP at home but never uses it due to intolerance and taking off the mask.   - VBG after night without CPAP was acceptable. Defer CPAP use since it is not part of her home regimen.   - Repeat blood gas if excessive somnolence occurs.   - Wean oxygen as tolerated     CAD  CAD s/p  PCI x2 to LAD and CABG in 2018   -Continue PTA Aspirin  -lipitor 40mg daily     Diabetes on long-term insulin  -PTA lantus 32 units daily now on 5 units daily   -MSSI  -Hypoglycemic protocol     Hypothyroidism  -Continue PTA levothyroxine 150mcg     History of gout  - Rechecked uric acid per Odalis's request. Within normal range, no change to plan at this point.      Non cardiac chest pain   Noted chest pain 7/21, which was reproducible over patients known cyst on her left breast. This has been noted in clinic visits as well   - CTM      Constipation   This is a chronic issues per patients care giver odalis.   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 She also enjoys watching birds, watching TV, and visiting with friends and family. Odalis has been her friend and roommate for many years and is her healthcare POA. Mariel's sisters both live out of state but they stay in touch by phone. Mariel enjoys being at home but is dependent for many cares. Mariel has been full code in the past but she and Odalis are open to considering DNR given interval worsening of multiple health concerns. They would like to discuss this further in the future.     Consultations This Hospital Stay   NURSING TO CONSULT FOR VASCULAR ACCESS CARE IP CONSULT  NURSING TO CONSULT FOR VASCULAR ACCESS CARE IP CONSULT  PHYSICAL THERAPY ADULT IP CONSULT  OCCUPATIONAL THERAPY ADULT IP CONSULT  CARDIOLOGY GENERAL ADULT IP CONSULT  CARE MANAGEMENT / SOCIAL WORK IP CONSULT  UROLOGY IP CONSULT  SPIRITUAL HEALTH SERVICES IP CONSULT  PHYSICAL THERAPY ADULT IP CONSULT  OCCUPATIONAL THERAPY ADULT IP CONSULT    Code Status   Full Code    Time Spent on this Encounter   IAramis DO, personally saw the patient today and spent greater than 30 minutes discharging this patient.       Aramis Blanc DO  [facility] [address]  [address] [place] [id number]  Phone: [phone]  Fax: [phone]  ______________________________________________________________________    Physical Exam   Vital Signs: Temp: 98.4  F (36.9  C) Temp src: Axillary BP: 114/78 Pulse: 73   Resp: 18 SpO2: 98 % O2 Device: Nasal cannula Oxygen Delivery: 2 LPM  Weight: 0 lbs 0 oz    Gen: In bed no distress  Resp: Breathing comfortably on room air   CV: RRR  Abd: Obese  Neuro: Alert but unable to reliably answer questions          Primary Care Physician   Malika Wolf    Discharge Orders      General info for SNF    Length of Stay Estimate: Short Term Care: Estimated # of Days <30  Condition at Discharge: Stable  Level of  care:skilled   Rehabilitation Potential: Fair  Admission H&P remains valid and up-to-date: Yes  Recent Chemotherapy: N/A  Use Nursing Home Standing Orders: Yes     Mantoux instructions    Give two-step Mantoux (PPD) Per Facility Policy Yes     Follow Up and recommended labs and tests    Follow up with assisted physician.  The following labs/tests are recommended: BMP, CBC.     Reason for your hospital stay    You were in the hospital for altered mental status likely due to a urinary tract infection. This was treated with antibiotics and you are now ok to discharge to TCU.     Daily weights    Call Provider for weight gain of more than 2 pounds per day or 5 pounds per week.     Activity - Up with nursing assistance     Physical Therapy Adult Consult    Evaluate and treat as clinically indicated.    Reason:  Deconditioning due to acute on chronic illness     Occupational Therapy Adult Consult    Evaluate and treat as clinically indicated.    Reason:  Deconditioning due to acute on chronic illness     Diet    Follow this diet upon discharge: Orders Placed This Encounter      2 Gram Sodium Diet       Significant Results and Procedures   Most Recent 3 CBC's:  Recent Labs   Lab Test 07/29/24  0956 07/28/24  0717 07/27/24  0923   WBC 7.0 6.8 6.7   HGB 11.1* 11.1* 11.4*   MCV 90 88 91    181 163     Most Recent 3 BMP's:  Recent Labs   Lab Test 07/29/24  1704 07/29/24  1257 07/29/24  1153 07/28/24  0752 07/28/24  0717 07/27/24  1308 07/27/24  0923   NA  --   --  142  --  141  --  137   POTASSIUM  --   --  4.3  --  3.9  --  3.6   CHLORIDE  --   --  102  --  102  --  100   CO2  --   --  30*  --  28  --  26   BUN  --   --  44.4*  --  44.4*  --  44.3*   CR  --   --  1.43*  --  1.34*  --  1.35*   ANIONGAP  --   --  10  --  11  --  11   ANTOINETTE  --   --  10.2  --  9.4  --  9.3   * 169* 209*   < > 164*   < > 169*    < > = values in this interval not displayed.     Most Recent 2 LFT's:  Recent Labs   Lab Test  07/17/24  0632 07/16/24  2030   AST 23 30   ALT 18 12   ALKPHOS 120 118   BILITOTAL 0.4 0.4     Most Recent 3 INR's:  Recent Labs   Lab Test 05/22/24  1332 05/12/20  1420 11/11/19  1851   INR 0.96 1.01 1.04   ,   Results for orders placed or performed during the hospital encounter of 07/16/24   Head CT w/o contrast    Narrative    EXAM: CT HEAD W/O CONTRAST  LOCATION: [facility]  DATE: 7/16/2024    INDICATION: Altered Mental Status  COMPARISON: Brain MRI 5/22/2024  TECHNIQUE: Routine CT Head without IV contrast. Multiplanar reformats. Dose reduction techniques were used.    FINDINGS:  INTRACRANIAL CONTENTS: No intracranial hemorrhage, extraaxial collection, or mass effect.  No CT evidence of acute infarct. Mild to moderate volume loss and mild burden presumed chronic small vessel ischemia are stable.    VISUALIZED ORBITS/SINUSES/MASTOIDS: No intraorbital abnormality. No paranasal sinus mucosal disease. No middle ear or mastoid effusion.    BONES/SOFT TISSUES: No acute abnormality.      Impression    IMPRESSION:  1.  No acute intracranial abnormality or significant change compared to the prior study.   CT Abdomen Pelvis w/o Contrast    Narrative    EXAM: CT ABDOMEN PELVIS W/O CONTRAST  LOCATION: [facility]  DATE: 7/16/2024    INDICATION: Altered mental status and abdominal pain.  COMPARISON: None available.  TECHNIQUE: CT scan of the abdomen and pelvis was performed without IV contrast. Multiplanar reformats were obtained. Dose reduction techniques were used.  CONTRAST: None.    FINDINGS:      Absence of intravenous contrast limits the sensitivity of this examination for detection of infectious/inflammatory change, post traumatic abnormalities, vascular abnormalities, and visceral lesions. Patient was imaged with arm(s) at side, limiting study   quality.    LOWER CHEST: Ovoid, partially calcified soft tissue density within the  subcutaneous tissues of the inferomedial left chest, series 6 image 7, measuring 2.4 x 4.8 cm, possibly a sebaceous cyst and of doubtful clinical significance.    Mild cardiomegaly. Atherosclerotic calcification of coronary arteries and visualized thoracic aorta.    HEPATOBILIARY: Liver is normal in attenuation and size. Subxiphoid herniation contains a small portion of the anterior left hepatic lobe.      Cholecystectomy.    No intrahepatic or intrahepatic biliary ductal dilatation.    PANCREAS: Normal attenuation. No peripancreatic inflammatory fat stranding.    SPLEEN: Normal attenuation. Normal size.    ADRENAL GLANDS: Normal.    KIDNEYS: No hydronephrosis.    No nephroureterolithiasis.    Mildly thick-walled, likely inflamed urinary bladder; correlation with urinalysis is recommended. Wall thickening is asymmetric towards the right.    PELVIC ORGANS: Hysterectomy.    BOWEL: No evidence of acute gastrointestinal inflammation or obstruction. Normal appendix.    No intraperitoneal free fluid or free air.    LYMPH NODES: No suspicious abdominal or pelvic lymphadenopathy.    VASCULATURE: No abdominal aortic aneurysm. Severe vascular calcifications.    MUSCULOSKELETAL: No suspicious abnormality.    OTHER: No additionally significant abnormalities.      Impression    IMPRESSION:     Asymmetric wall thickening of the urinary bladder, which is possibly inflamed; correlation with urinalysis is recommended. Given the asymmetric wall thickening, CT cystogram follow-up is recommended.   XR Chest 1 View    Narrative    EXAM: CHEST SINGLE VIEW PORTABLE  LOCATION: [facility]  DATE: 7/16/2024    INDICATION: Altered mental status. Congestive heart failure.  COMPARISON: 7/12/2022.    FINDINGS: Mildly increased interstitial opacities in both lungs. Unchanged cardiac silhouette. Atherosclerotic calcification in the thoracic aorta. Prior median sternotomy.      Impression    IMPRESSION:    1. Mildly increased interstitial opacity in both lungs. These are nonspecific and could relate to interstitial pulmonary edema or infection.  2. No other findings suspicious for active cardiopulmonary disease.    US Bladder w Pre and Post Void Residual    Narrative    Exam: US BLADDER W PRE AND POST VOID RESIDUAL, 2024 10:42 AM    Indication: Recurrent UTIs    Additional information provided by sonographer: Patient unable to  follow commands due to mental status    Comparison: CT abdomen and pelvis, 2024    Findings:     The urinary bladder is moderately distended, with a volume of 351 mL.  Small amount of echogenicities layering dependently without  vascularity on color Doppler (image 7).  Unable to obtain post void bladder volume.      Impression    Impression: Moderate urinary bladder distention; layering dependent  echogenicities, nonspecific, underlying cystitis not excluded. Post  void residual could not be obtained.    I have personally reviewed the examination and initial interpretation  and I agree with the findings.    BERTIN EMNDOZA MD         SYSTEM ID:  [id number]   Echo Complete     Value    LVEF  25-30% (severely reduced)    Narrative    [id number]  [facility]  [id number]  [id number]^VENICE^RAI     [facility],[place]  Echocardiography Laboratory  [address] [place] [id number]     Name: ROSY PALMER  MRN: [id number]  : 1945  Study Date: 2024 10:56 AM  Age: 79 yrs  Gender: Female  Patient Location: [facility]  Reason For Study: TOWNSEND  Ordering Physician: RAI MILLARD  Performed By: Myrna Liang     BSA: 2.3 m2  Height: 66 in  Weight: 290 lb  HR: 77  BP: 155/66 mmHg  ______________________________________________________________________________  Procedure  Complete Portable Echo Adult. Contrast Optison. Echocardiogram with two-  dimensional, color and spectral Doppler performed. Optison (NDC #0638-1093-93)  given intravenously. Patient  was given 6 ml mixture of 3 ml Optison and 6 ml  saline. 3 ml wasted.  ______________________________________________________________________________  Interpretation Summary  Severe left ventricular dilation is present. Left ventricular function is  decreased. The ejection fraction is 25-30% (severely reduced). Severe diffuse  hypokinesis is present.  Global right ventricular function is normal.  Mild aortic insufficiency is present.  Estimated mean right atrial pressure is elevated.  No pericardial effusion is present.  ______________________________________________________________________________  Left Ventricle  Severe left ventricular dilation is present. Left ventricular wall thickness  is normal. Left ventricular function is decreased. The ejection fraction is  25-30% (severely reduced). Severe diffuse hypokinesis is present. There is no  thrombus seen in the left ventricle.     Right Ventricle  The right ventricle is normal size. Global right ventricular function is  normal.     Atria  Both atria appear normal.     Mitral Valve  The mitral valve is normal. Trace mitral insufficiency is present.     Aortic Valve  Aortic valve sclerosis is present. Mild aortic insufficiency is present. No  aortic stenosis is present.     Tricuspid Valve  The tricuspid valve is normal. Trace to mild tricuspid insufficiency is  present. The peak velocity of the tricuspid regurgitant jet is not obtainable.  Pulmonary artery systolic pressure cannot be assessed.     Pulmonic Valve  The pulmonic valve is normal.     Vessels  The aorta root is normal. Estimated mean right atrial pressure is elevated.     Pericardium  No pericardial effusion is present.     Compared to Previous Study  This study was compared with the study from 2022 . LV function has declined.  ______________________________________________________________________________  MMode/2D Measurements & Calculations     IVSd: 0.88 cm  LVIDd: 6.3 cm  LVIDs: 5.7 cm  LVPWd: 1.1  cm  FS: 9.8 %  LV mass(C)d: 264.3 grams  LV mass(C)dI: 112.8 grams/m2  Ao root diam: 2.5 cm  asc Aorta Diam: 2.9 cm  LVOT diam: 2.0 cm  LVOT area: 3.1 cm2  Ao root diam index Ht(cm/m): 1.5  Ao root diam index BSA (cm/m2): 1.1  Asc Ao diam index BSA (cm/m2): 1.2  Asc Ao diam index Ht(cm/m): 1.7  EF Biplane: 29.8 %  LA Volume (BP): 87.1 ml     LA Volume Index (BP): 37.2 ml/m2  RWT: 0.34     Doppler Measurements & Calculations  MV E max alan: 120.0 cm/sec  MV A max alan: 87.0 cm/sec  MV E/A: 1.4  MV dec slope: 837.0 cm/sec2  MV dec time: 0.14 sec  Ao V2 max: 174.0 cm/sec  Ao max P.1 mmHg  Ao V2 mean: 126.0 cm/sec  Ao mean P.0 mmHg  Ao V2 VTI: 43.1 cm  COLIN(I,D): 1.7 cm2  COLIN(V,D): 1.8 cm2  LV V1 max PG: 3.8 mmHg  LV V1 max: 96.9 cm/sec  LV V1 VTI: 23.9 cm  SV(LVOT): 75.2 ml  SI(LVOT): 32.1 ml/m2  PA acc time: 0.13 sec  AV Alan Ratio (DI): 0.56  COLIN Index (cm2/m2): 0.74  E/E' av.1     Lateral E/e': 22.2  Medial E/e': 28.0  RV S Alan: 9.6 cm/sec     ______________________________________________________________________________  Report approved by: Aiden Vazquez 2024 12:58 PM         Echo Limited     Value    LVEF  20-25% (severely reduced)    Narrative    [id number]  [id number]  [id number]  [id number]^BJORN^NOELLE     [facility],[place]  Echocardiography Laboratory  [address] [place] [address]     Name: ROSY PALMER  MRN: [id number]  : 1945  Study Date: 2024 01:05 PM  Age: 79 yrs  Gender: Female  Patient [facility]  Reason For Study: Heart Failure - Left  Ordering Physician: NOELLE MILAN  Performed By: Ivett Fink     BSA: 2.3 m2  Height: 66 in  Weight: 290 lb  HR: 73  BP: 150/87 mmHg  ______________________________________________________________________________  Procedure  Limited Portable Echo Adult.  ______________________________________________________________________________  Interpretation Summary  Severe left ventricular dilation is  present.  Left ventricular function is decreased. The ejection fraction is 20-25%  (severely reduced).  Right ventricular function, chamber size, wall motion, and thickness are  normal.  The inferior vena cava is normal.  No pericardial effusion is present.  No significant changes noted.  ______________________________________________________________________________  Left Ventricle  Severe left ventricular dilation is present. Left ventricular function is  decreased. The ejection fraction is 20-25% (severely reduced).     Right Ventricle  Right ventricular function, chamber size, wall motion, and thickness are  normal.     Vessels  The inferior vena cava is normal.     Pericardium  No pericardial effusion is present.     Compared to Previous Study  No significant changes noted.  ______________________________________________________________________________  Doppler Measurements & Calculations  PA acc time: 0.12 sec     ______________________________________________________________________________  Report approved by: Aiden Kang 07/24/2024 03:34 PM           *Note: Due to a large number of results and/or encounters for the requested time period, some results have not been displayed. A complete set of results can be found in Results Review.       Discharge Medications   Current Discharge Medication List        START taking these medications    Details   polyethylene glycol (MIRALAX) 17 GM/Dose powder Take 17 g by mouth 2 times daily    Associated Diagnoses: Chronic constipation           CONTINUE these medications which have CHANGED    Details   insulin glargine (LANTUS SOLOSTAR) 100 UNIT/ML pen Inject 5 Units subcutaneously every morning    Comments: If Lantus is not covered by insurance, may substitute Basaglar or Semglee or other insulin glargine product per insurance preference at same dose and frequency.    Associated Diagnoses: Type 2 diabetes mellitus with stage 4 chronic kidney disease, with long-term  current use of insulin (H)           CONTINUE these medications which have NOT CHANGED    Details   !! acetaminophen (TYLENOL) 500 MG tablet Take 1,000 mg by mouth 2 times daily as needed for mild pain      !! acetaminophen (TYLENOL) 500 MG tablet Take 1,000 mg by mouth 2 times daily      anastrozole (ARIMIDEX) 1 MG tablet Take 1 tablet (1 mg) by mouth daily  Qty: 90 tablet, Refills: 3    Associated Diagnoses: Ductal carcinoma in situ (DCIS) of left breast      aspirin (ASA) 81 MG EC tablet Take 1 tablet (81 mg) by mouth daily    Associated Diagnoses: (HFpEF) heart failure with preserved ejection fraction (H)      atorvastatin (LIPITOR) 40 MG tablet TAKE 1 TABLET(40 MG) BY MOUTH IN THE MORNING  Qty: 90 tablet, Refills: 1    Associated Diagnoses: Atherosclerosis of native coronary artery without angina pectoris, unspecified whether native or transplanted heart      bumetanide (BUMEX) 1 MG tablet Take 4 tablets (4 mg) by mouth daily  Qty: 360 tablet, Refills: 1    Comments: Increasing dose.  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  Qty: 36 tablet, Refills: 11    Associated Diagnoses: Chronic diastolic heart failure (H); Iron deficiency anemia secondary to inadequate dietary iron intake      levothyroxine (SYNTHROID) 150 MCG tablet Take 1 tablet (150 mcg) by mouth daily  Qty: 90 tablet, Refills: 3    Associated Diagnoses: Other specified hypothyroidism      losartan (COZAAR) 25 MG tablet Take 1 tablet (25 mg) by mouth daily  Qty: 90 tablet, Refills: 1     Associated Diagnoses: Chronic diastolic heart failure (H)      metoprolol succinate ER (TOPROL XL) 25 MG 24 hr tablet Take 1 tablet (25 mg) by mouth every evening  Qty: 90 tablet, Refills: 3    Associated Diagnoses: Chronic heart failure with preserved ejection fraction (HFpEF) (H)      miconazole (MICATIN/MICRO GUARD) 2 % external powder Apply topically as needed for itching or other Redness in bilateral groin and katharine clef  Qty: 43 g, Refills: 0    Associated Diagnoses: Intertrigo      mirabegron (MYRBETRIQ) 25 MG 24 hr tablet Take 1 tablet (25 mg) by mouth daily  Qty: 30 tablet, Refills: 3    Comments: Replaces tolteridone  Associated Diagnoses: Urinary frequency      nystatin (MYCOSTATIN) 431176 UNIT/GM external ointment Apply topically 2 times daily Apply to external vagina 2 times daily.  Qty: 30 g, Refills: 3    Associated Diagnoses: Candidiasis of vulva and vagina      nystatin POWD 1 Dose 4 times daily  Qty: 1 each, Refills: 1    Associated Diagnoses: Candidiasis of skin      potassium chloride malcolm ER (KLOR-CON M10) 10 MEQ CR tablet TAKE 2 TABLETS(20 MEQ) BY MOUTH TWICE DAILY  Qty: 360 tablet, Refills: 3    Associated Diagnoses: Chronic diastolic heart failure (H)      pregabalin (LYRICA) 100 MG capsule TAKE 1 CAPSULE(100 MG) BY MOUTH TWICE DAILY  Qty: 60 capsule, Refills: 4    Associated Diagnoses: Pain in joint of left shoulder      PROAIR  (90 Base) MCG/ACT inhaler INHALE 2 PUFFS INTO THE LUNGS EVERY 6 HOURS  Qty: 25.5 g, Refills: 9    Comments: Pharmacy may dispense brand covered by insurance (Proair, or proventil or ventolin or generic albuterol inhaler)  Associated Diagnoses: Simple chronic bronchitis (H)      venlafaxine (EFFEXOR XR) 150 MG 24 hr capsule Take 1 capsule (150 mg) by mouth daily  Qty: 30 capsule, Refills: 3    Comments: New dosage.  Associated Diagnoses: Mild episode of recurrent major depressive disorder (H24)      Continuous Blood Gluc  (FREESTYLE MARTY 3 READER) JEZ  1 each once for 1 dose Use to read blood sugars per 's instructions.  Qty: 1 each, Refills: 0    Associated Diagnoses: Type 2 diabetes mellitus with stage 3b chronic kidney disease, with long-term current use of insulin (H)      Continuous Blood Gluc Sensor (FREESTYLE MARTY 3 SENSOR) MISC 1 each every 14 days Use 1 sensor every 14 days. Use to read blood sugars per 's instructions.  Qty: 6 each, Refills: 5    Associated Diagnoses: Type 2 diabetes mellitus with stage 3b chronic kidney disease, with long-term current use of insulin (H)      EPINEPHrine (ANY BX GENERIC EQUIV) 0.3 MG/0.3ML injection 2-pack Inject 0.3 mLs (0.3 mg) into the muscle as needed for anaphylaxis May repeat one time in 5-15 minutes if response to initial dose is inadequate.  Qty: 2 each, Refills: 1    Associated Diagnoses: Anaphylactic reaction to bee sting, undetermined intent, sequela      febuxostat (ULORIC) 80 MG TABS tablet Take 1 tablet (80 mg) by mouth daily  Qty: 90 tablet, Refills: 0    Associated Diagnoses: Idiopathic chronic gout of multiple sites without tophus      nitroGLYcerin (NITROSTAT) 0.4 MG sublingual tablet For chest pain place 1 tablet under the tongue every 5 minutes for 3 doses. If symptoms persist 5 minutes after 1st dose call 911.  Qty: 25 tablet, Refills: 1    Associated Diagnoses: Atherosclerosis of native coronary artery without angina pectoris, unspecified whether native or transplanted heart       !! - Potential duplicate medications found. Please discuss with provider.        Allergies   Allergies   Allergen Reactions    Contrast Dye Anaphylaxis    Fish Allergy Anaphylaxis    Iodine Anaphylaxis    Oxycodone Other (See Comments)     Severe suicidal tendencies on this medication    Tree Nuts [Nuts] Anaphylaxis     Tree nuts only (peanuts ok)    Bactrim      Increased uric acid    Betadine [Povidone Iodine] Hives, Swelling and Difficulty breathing     Betadine    Combivent      Rash     Dulaglutide Other (See Comments)     Hx . Of thyroid cancer.    Fish Oil     Lisinopril Other (See Comments)     Scr/grf severely reduced.     Penicillins Rash    Allopurinol Rash    Latex Rash    Wool Fiber Rash

## 2024-07-29 NOTE — PLAN OF CARE
Shift: [id number]  Goal Outcome Evaluation:      Plan of Care Reviewed With: patient    Overall Patient Progress: no changeOverall Patient Progress: no change    A&O to self. Turned Q2, refuses at times, not OOB. Incontinet of B&B, purewick in place.   R, PIV, SL. No changes this shift. On 2L NC, cont pulse ox.  Continue with POC.

## 2024-07-29 NOTE — PROGRESS NOTES
7/29/24  Care Management Follow Up    Update: CHW completed PAS, [id number]. CHW assisted  with arranging a discharge stretcher ride through [facility] ([phone]). Window service time is from 1804 to 1859. This was notified to the SW. CHW called Odalis ([phone]). No questions or concerns were mentioned.    ACCEPTING:    [facility] Liaison  PH: [phone]  FAX: [phone]    Pending:     [facility]  [address] [place] [address]  [phone]  7/24: Initial referral sent  7/25: CHW called admissions and LVM requesting a call back. Pt is potentially being reviewed for LTC     [facility]  PH: [phone]  FAX: [phone]  7/25: Initial referral sent      Declined:     [facility]  [address] [place] [address]  [phone]   7/24: Initial referral sent. Declined, no solid discharge plan, bariatric needs, behavior issues/concerns     [address] [id number] [phone]  7/24: Initial referral sent. Declined, bed not available, bariatric needs     [facility]  [address] [place]  [address]  P: [phone]  F: [phone]  7/24: Initial referral sent. Declined, no bed available     [facility]  [address] [place] [address]  [phone]   7/24: Initial referral sent  7/25: Declined due to no bed available      [facility]  [address] [address] [place] [address]  PH: [phone]   7/25: Initial referral sent   7/26: Declined due to pt needs to go to TCU with LTC attached     [address] [address]  PH: [phone]  7/25: Initial referral sent   7/26: Declined due to bed not available and facility full    [facility]  [address]  [place]  [address]  PH: [phone]  FAX: [phone]  7/25: Initial referral sent   7/29: Declined due to no bed available      [address] [address]  PH: [phone]  7/25: Initial referral sent   7/29: Declined due to no bed available     Sarah Dial  Inpatient CHW  [facility] 5 Ortho, 8 Med Surge, & ED  PH: [phone]

## 2024-07-30 NOTE — TELEPHONE ENCOUNTER
"Per staff message \"seen for recurrent UTI in the hospital. Can we get her a follow up with female urology provider (Kyle or Derrick) in about one to two months.\" Spoke with Odalis and she advised the patient is in the TCU, and didn't want to schedule yet. She agreed to a General Assembly message with scheduling info when needed.   "

## 2024-07-30 NOTE — PLAN OF CARE
Physical Therapy Discharge Summary    Reason for therapy discharge:    Discharged to transitional care facility.    Progress towards therapy goal(s). See goals on Care Plan in [facility] electronic health record for goal details.  Goals not met.  Barriers to achieving goals:   discharge from facility.    Therapy recommendation(s):    Continued therapy is recommended.  Rationale/Recommendations:  Pt would benefit from further PT for strengthening and increase independence with all functional mobility.

## 2024-08-07 NOTE — TELEPHONE ENCOUNTER
Patient Contacted for the patient to call back and schedule the following:    Appointment type:  RTN CORE   Provider: FRANKLYN  Return date: 12/06/24  Specialty phone number: [phone] OPT    Additional appointment(s) needed: N/A   Additonal Notes: LABS WILL BE SCHEDULED BY PT

## 2024-08-08 NOTE — PROGRESS NOTES
Called Odalis to check in and discuss Cardiomems. Mariel had a hospitalization and is now at TCU, hasn't used Cardiomems since prior to hospital stay.   Currently still in TCU and they haven't brought mems in. Odalis reports they had a care conference this morning and the fear was she wouldn't be able to bring her home but she is rehabbing well and they are planning to try to get her home in about 1 week. Odalis is proceeding with getting ramp put on house so they will then be able to get to medical appointments.   They plan to resume using Cardiomems once Mariel gets back home.     Odalis reports she brought up swelling in Mariel's legs this morning at care conference, and they are going to start using her velcro wraps again.   She does worry that Mariel is losing some cognitive skills and every hospitalization this seems to progress.     We talked about recent echo results with worsening EF and what this means. Odalis is wondering about next steps and how to manage this. Echo was repeated at end of hospitalization and EF was still low.   Told her I would review with Dr. Dominguez, and this might require a follow up visit to discuss more in depth.

## 2024-08-12 NOTE — PROGRESS NOTES
[facility]:  CardioMems Routine Remote Evaluation     Dates: 6/22/2024 through 7/21/2024    Adjustments made in the last month: none.     No adjustments made this month.  Discussed with patient/caregiver and they will continue current plan of care.     Threshold Alert Settings: PAD goal 3-6     Readings:         Future Appointments   Date Time Provider Department Center   9/4/2024  4:00 PM Ora Nunez MD [facility]   12/2/2024  5:00 PM Opal Weber MD [facility]   12/6/2024  3:00 PM Austin Miguel APRN CNP [facility]

## 2024-08-16 NOTE — PROGRESS NOTES
Remote monitoring of Pulmonary Artery Pressures via CardioMEMS device reviewed at least weekly and as needed with CORE nursing. Diuretics adjusted accordingly.     billing dates of 6/22/24 through 7/21/24    Austin [facility]

## 2024-08-27 NOTE — TELEPHONE ENCOUNTER
----- Message from Rosina BAILON sent at 8/27/2024  3:55 PM CDT -----  Regarding: schedule follow up  Hi - I thought I had sent a message about this but can't find it. Can you call patient (will be geeta, her roommate answering who takes care of her), needs to schedule a follow up with Dr. Dominguez. Can be virtual. No labs needed. Next available and please please on wait list. I know it will be a while out. Thanks!  Elena

## 2024-08-27 NOTE — TELEPHONE ENCOUNTER
8/27/2024 4:06PM Rula Portillo  Patient's roommate Odalis sellers scheduled appointment:  Date: 10/1/2024  Time: 3PM  Visit type: Return Heart Failure (video visit)  Provider: Dr. Dominguez  Location: Video Visit; [address], [place] [address]  Testing/imaging: NA  Additional notes: 8/27 Scheduled RHF vv w/ Dr. Dominguez 10/1 (ok per Elena Mays). No labs needed. Added pt to waitlist. TOSHIA portillo 8/27/2024 4:06PM
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
## 2024-09-02 PROBLEM — R41.82 ALTERED MENTAL STATUS, UNSPECIFIED ALTERED MENTAL STATUS TYPE: Status: ACTIVE | Noted: 2024-01-01

## 2024-09-02 PROBLEM — R53.1 WEAKNESS GENERALIZED: Status: ACTIVE | Noted: 2024-01-01
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
## 2024-09-02 NOTE — ED PROVIDER NOTES
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 presentation was of high complexity (an acute health issue posing potential threat to life or bodily function).  The patient's evaluation involved:  review of external note(s) from 3+ sources (see separate area of note for details)  review of 3+ test result(s) ordered prior to this encounter (see separate area of note for details)  ordering and/or review of 3+ test(s) in this encounter (see separate area of note for details)  discussion of management or test interpretation with another health professional (see separate area of note for details)      The patient's management necessitated high risk (a decision regarding hospitalization).    Assessment & Plan    Patient is a 79-year-old female with a recent diagnosis for UTI who recently discharged from a rehab facility back home with a known history of diabetes CHF with a EF of 25% who presents to the ER due to being unable to manage at home.  Patient no longer can do movement with a 1 person assist.  She is unable to get out of bed to a wheelchair with her roommate and POA helping her.  Patient's been bedbound and having very little oral intake.  Patient is worse than she was 3 days prior when she was discharged from the TCU.  Plan will be to evaluate for repeat  infection versus worsening CHF exacerbation.  Will obtain labs and EKG and a chest x-ray.  Patient will need to be admitted to the hospital for further care.  Power of  confirms DNR/DNI.  Also does not want a urinary catheter placed.    Because of her for COVID-19.  This likely was contributing to her worsening weakness.  Patient's urine is normal.  Patient is unable to be discharged home as she needs long-term placement.  Will be admitted to the hospital for further care.    I have reviewed the nursing notes. I have reviewed the findings, diagnosis, plan and need for follow up with the patient.    New Prescriptions    No medications on file       Final diagnoses:   Weakness generalized   Altered mental status, unspecified altered mental status type   COVID-19 virus infection       Sophia Engel MD  [facility] EMERGENCY DEPARTMENT  9/2/2024           Sophia Engel MD  09/02/24 9227

## 2024-09-02 NOTE — LETTER
[facility] MED SURG  [address] [id number] [phone]    FACSIMILE TRANSMITTAL SHEET    TO: Janice Gilbert   COMPANY: [employer]  FAX NUMBER: [phone]      FROM: Wendi/[facility]  PHONE: [phone]  DATE: 09/04/24      _____URGENT _____REVIEW ONLY _____PLEASE COMMENT____PLEASE REPLY    NOTES/COMMENTS: Please review!                                      IF YOU DID NOT RECEIVE THE CORRECT NUMBER OF PAGES OR THE FAX DID NOT COME THROUGH CLEARLY, PLEASE CALL THE SENDER     CONFIDENTIALITY STATEMENT: Confidential information that may accompany this transmission contains protected health information under state and federal law and is legally privileged. This information is intended only for the use of the individual or entity named above and may be used only for carrying out treatment, payment or other healthcare operations. The recipient or person responsible for delivering this information is prohibited by law from disclosing this information without proper authorization to any other party, unless required to do so by law or regulation. If you are not the intended recipient, you are hereby notified that any review, dissemination, distribution, or copying of this message is strictly prohibited. If you have received this communication in error, please destroy the materials and contact us immediately by calling the number listed above. No response indicates that the information was received by the appropriate authorized party

## 2024-09-02 NOTE — ED TRIAGE NOTES
BIBA AMS, failure to thrive, EMS placed on 5L O2 crackles BLS, needs SW as well     Triage Assessment (Adult)       Row Name 09/02/24 1138          Triage Assessment    Airway WDL WDL        Respiratory WDL    Respiratory WDL X;cough;mucous membranes        Cardiac WDL    Cardiac WDL WDL        Cognitive/Neuro/Behavioral WDL    Cognitive/Neuro/Behavioral WDL X;mood/behavior;motor response;orientation     Level of Consciousness confused     Orientation disoriented to;place;time;situation     Speech garbled        Pupils (CN II)    Pupil PERRLA yes     Pupil Size Left 2 mm     Pupil Size Right 2 mm        [place] Coma Scale    Best Eye Response 4-->(E4) spontaneous     Best Motor Response 6-->(M6) obeys commands     Best Verbal Response 2-->(V2) incomprehensible speech     [place] Coma Scale Score 12     Assessment Qualifiers patient not sedated/intubated

## 2024-09-02 NOTE — CODE/RAPID RESPONSE
Stroke Code Nurse-Responder Note    Arrival Time to Stroke Code: 1703    Stroke Code Team interventions:   - De-escalated at 1722 by Jostin Muñoz MD    ED/Bedside Nurse providing handoff: Rafi Sheridan RN    Time left for CT: 1710    Time arrived to next location (ED/Unit/IR): 1722    ED/Bedside Nurse given handoff (name/time): Rafi Sheridan RN 1725    AISHA NUNEZ RN

## 2024-09-02 NOTE — CONSULTS
"[facility]     Stroke Code Note          History of Present Illness     Chief Complaint: Stroke code, called for aphasia      Mariel Ribeiro is a 79 year old female h/o morbid obesity, BELEN, HLD (last LDL 99), HTN, DM2 (last A1c 7.6%) HFrEF (last EF 20 to 25% 7/16/2024), CAD s/p CABG space x3, bilateral carotid stenosis, migraines, and cognitive decline of unclear etiology with baseline aphasia per Odalis (roommate and caregiver present at bedside).  Stroke code called by hospital medicine from the ED, after Odalis reported several days of aphasia worsened from baseline.  Initial NIHSS of 15, without clearly localizing or lateralizing features beyond exacerbated baseline aphasia.  BP quite labile, ranging from the 120s/90s to the 160s/120s.  Blood glucose 146.    Per discussion with Odalis, review of the notes Mariel was recently hospitalized for acute cystitis secondary to group B strep and Pseudomonas from 7/16/2024 to 7/29/2024.  She was discharged to TCU, where she convalesced for approximately a month.  Per notes, Odalis was told that patient could transfer with an assist of 1 person, and based on this assurance Odalis consented to discharge home in her care 3 days ago.      However by the following morning, patient would not get out of bed.  Roommate needed to call police/fire services to assist in transferring from wheelchair to bed.  Over the ensuing 3 days, patient \"refused to move\" (per Odalis), and has essentially spent the entire time since her discharge from TCU in bed.  Odalis brought her into the ED because she couldn't get her to move / get out of bed.      Since her TCU discharge, Odalis has additionally noted that she has been slowly speaking less and less.  At baseline, Odalis states that Mariel \"cannot string a sentence together\" and has frequent word finding difficulty.  Odalis struggles to identify a clear change from one day to the next regarding her " "speech, however does clearly feel that since her discharge from the TCU she has been speaking quite a bit less, and has not set anything clearly intelligible for at least the last 24 hours.    Of note, per review of the record she has recurrent presentations for encephalopathy.  She was last seen by neurology 11/17/2022 (Dr. Denney of [facility]), who based on neuropsychiatric evaluation, MRI imaging, and review of her medical and laboratory record felt that her cognitive decline likely represented a waxing and waning encephalopathy, due to her multiple medical issues, rather than an underlying neurodegenerative disease.           Past Medical History     Stroke risk factors: BELEN, hyperlipidemia, hypertension, DM2, HFrEF, CAD, bilateral carotid stenosis, age    Preadmission antithrombotic regimen: 81 mg ASA    Modified [place] Score (Pre-morbid)  5 - Severe disability.  Requires constant nursing care and attention, bedridden.                   Assessment and Plan       #Worsened baseline aphasia  #Suspected toxic/metabolic encephalopathy  #Diminished cognitive reserve / cognitive impairment of unclear etiology  #COVID-positive  #Possible HFrEF exacerbation  #At high risk for stroke (HLD, HTN, DM2, HFrEF, CAD, bilateral carotid stenosis, BELEN)    Mariel Ribeiro is a 79-year-old female with a complex past medical history most relevant for significant cognitive decline with baseline aphasia and recurrent presentations with encephalopathy, multiple vascular risk factors as above, and baseline diminished mobility and functional independence (MRS of 4-5).  Given remote last known well, TNK administration was contraindicated.  Exam, as further discussed below, was incompatible with LVO.    She now presents with, per caregiver Odalis, \"refusal\" to get out of bed along with diminished spontaneous speech and impaired command following somewhat out of proportion to her baseline.    On exam, she does appear to be aphasic: " "She is not following commands, does not cooperate with naming, and generally does not speak.  However, she is spontaneously alert, tracking, without clearly localizing or lateralizing motor, sensory, or cranial nerve deficits I can appreciate on exam.  Furthermore, in response to gentle noxious stimuli at one point she grabbed the examiner and said \"no, stop!\"    Given this exam (with only exacerbation of baseline deficits, without features pointing to an LVO amenable to thrombectomy), in the setting of a reportedly severe anaphylactoid contrast allergy (per Odalis's report and initial review of the record), CTA was deferred.    In aggregate, her presentation appears consistent with toxic metabolic encephalopathy in the setting of an active COVID-19 infection, and likely heart failure exacerbation given her quite elevated NT BNP, with a wet sounding cough and diffuse edema.    That said, she has a quite elevated risk for stroke, and from this perspective we would recommend an MRI/MRA when able.  That said, given reportedly significant cognitive impairment, difficulty in tolerating CT scan, I anticipate that significant sedation may be needed in order for her to tolerate an MRI scan.  Odalis, understandably, raises concerns about whether or not this imaging would benefit Mariel in the long-term given her significant cognitive and functional impairment, and multiple medical comorbidities.  If considering significant sedation, or intubation, Odalis expresses a desire for careful risk/benefit consideration.       Intravenous Thrombolysis  Not given due to:   - unclear or unfavorable risk-benefit profile for extended window thrombolysis beyond the conventional 4.5 hour time window     Endovascular Treatment  Not initiated due to absence of proximal vessel occlusion     Plan:  -MRI/MRA  -Continue PTA ASA, atorvastatin, antihypertensives unchanged, unless contraindicated from a medical perspective  -Aggressive toxic/metabolic " "workup, treatment of infections and heart failure exacerbation per primary team discretion     ___________________________________________________________________    The patient was discussed with Stroke Fellow, Dr. Serra.  The Stroke Staff is Dr. Resendiz.    Jostin Muñoz MD  Neurology Resident  Please reach out via [employer] or to the Stroke Neurology pass pager on [employer]  ___________________________________________________________________        Imaging/Labs   (personally reviewed )    CT head:   1. No acute intracranial hemorrhage.  2. Mild Leukoaraiosis and mild generalized cerebral atrophy.  3. ASPECT Score: 10/10.           Physical Examination     BP: (!) 146/97   Pulse: 97   Resp: 24   Temp: 98.4  F (36.9  C)   Temp src: Oral   SpO2: 92 %   O2 Device: None (Room air)   Weight: 125 kg (275 lb 9.2 oz)    Wt Readings from Last 2 Encounters:   09/02/24 125 kg (275 lb 9.2 oz)   07/10/24 131.5 kg (290 lb)       General Exam  General:  patient lying in bed without any acute distress, starting ahead, wide eyed  HEENT:  normocephalic/atraumatic  Cardio:  RRR on tele  Pulmonary:  Frequent wet cough noted, otherwise no apparent respiratory distress  Abdomen:  Morbidly obese  Extremities:  Diffuse pitting edema superimposed on marked obesity noted  Skin:  Visible skin intact, scattered bruises noted, no acute rash appreciated    Neuro Exam  Mental Status: Briskly, spontaneously alert.  Intermittently tracks examiner, appears quite distractible, but reliably attends to light touch.  Becomes angry, and briskly localizes to gentle noxious stimuli.  When you attempt to raise her arms to complete NIHSS testing, she forcibly resists.  Does not follow simple commands.  No intelligible speech appreciated beyond a \"no, stop\" to noxious stimulation.  Cranial Nerves: Moderate dysarthria noted with single episode of limited speech.  Face symmetric to grimace.  Tongue appears midline.  Blink to threat bilaterally, PERRL.  Eyes " tracking, without gregory gaze deviation or apparent ophthalmoplegia.  Cough noted to be spontaneously intact.    Motor: Does not follow commands to raise arms, unable to raise arms above bed due to patient resistance -- appears to be 5/5 in diffusely arms based on this limited confrontation exam.  Brisk, antigravity withdrawal to gentle noxious (to cold feet) in bilateral lower extremities.  Reflexes:  no clonus  Sensory: Attends to light touch in distal extremities x 4, withdraws to gentle noxious symmetrically as above  Coordination: Unable to formally assess, however no gross dysmetria apparent to observation of spontaneous movements  Station/Gait: Deferred        Stroke Scales       NIHSS  1a. Level of Consciousness 0-->Alert, keenly responsive   1b. LOC Questions 2-->Answers neither question correctly   1c. LOC Commands 2-->Performs neither task correctly   2.   Best Gaze 0-->Normal   3.   Visual 0-->No visual loss   4.   Facial Palsy 0-->Normal symmetrical movements   5a. Motor Arm, Left 2-->Some effort against gravity, limb cannot get to or maintain (if cued) 90 (or 45) degrees, drifts down to bed, but has some effort against gravity   5b. Motor Arm, Right 2-->Some effort against gravity, limb cannot get to or maintain (if cued) 90 (or 45) degrees, drifts down to bed, but has some effort against gravity   6a. Motor Leg, Left 2-->Some effort against gravity, leg falls to bed by 5 secs, but has some effort against gravity   6b. Motor Leg, right 2-->Some effort against gravity, leg falls to bed by 5 secs, but has some effort against gravity   7.   Limb Ataxia 0-->Absent   8.   Sensory 0-->Normal, no sensory loss   9.   Best Language 2-->Severe aphasia, all communication is through fragmentary expression, great need for inference, questioning, and guessing by the listener. Range of information that can be exchanged is limited, listener carries burden of. . . (see row details)   10. Dysarthria 1-->Mild-to-moderate  "dysarthria, patient slurs at least some words and, at worst, can be understood with some difficulty   11. Extinction and Inattention  0-->No abnormality   Total 15 (09/02/24 1714)            Labs     CBC  Lab Results   Component Value Date    HGB 12.1 09/02/2024    HCT 39.1 09/02/2024    WBC 7.6 09/02/2024     09/02/2024       BMP  Lab Results   Component Value Date     09/02/2024    POTASSIUM 4.1 09/02/2024    CHLORIDE 103 09/02/2024    CO2 23 09/02/2024    BUN 27.6 (H) 09/02/2024    CR 1.31 (H) 09/02/2024     (H) 09/02/2024    ANTOINETTE 9.5 09/02/2024       INR  INR   Date Value Ref Range Status   05/22/2024 0.96 0.85 - 1.15 Final   05/12/2020 1.01 0.86 - 1.14 Final   11/11/2019 1.04 0.86 - 1.14 Final       Data   Stroke Code Data  (for stroke code without tele)  Stroke code activated 09/02/24  1705   First stroke provider response 09/02/24  1707   Last known normal 10/04/24   (Precise time unknown)   Time of discovery (or onset of symptoms) 10/04/24   (Precise time unknown)   Head CT read by Stroke Neuro Provider 09/02/24  1722 (Delayed due to transport/packaging, in light of COVID / isolation precautions)   Was stroke code de-escalated? Yes  09/02/24  1728        Clinically Significant Risk Factors Present on Admission            # Hypomagnesemia: Lowest Mg = 1.5 mg/dL in last 2 days, will replace as needed   # Hypoalbuminemia: Lowest albumin = 3.4 g/dL at 9/2/2024  1:47 PM, will monitor as appropriate   # Drug Induced Platelet Defect: home medication list includes an antiplatelet medication   # Hypertension: Noted on problem list  # Acute heart failure with reduced ejection fraction: last echo with EF <40% and receiving IV diuretics       # DMII: A1C = 7.6 % (Ref range: <5.7 %) within past 6 months   # Severe Obesity: Estimated body mass index is 44.48 kg/m  as calculated from the following:    Height as of this encounter: 1.676 m (5' 6\").    Weight as of this encounter: 125 kg (275 lb 9.2 oz).    "    # Financial/Environmental Concerns:     # History of CABG: noted on surgical history          # CKD, Stage 3b (GFR 30-44): Will monitor and treat as appropriate  - last Cr =  1.31 mg/dL (Ref range: 0.51 - 0.95 mg/dL)  - last GFR = 42 mL/min/1.73m2 (Ref range: >60 mL/min/1.73m2)       Time Spent on this Encounter

## 2024-09-02 NOTE — LETTER
[facility] MED SURG  [address] [id number] [phone]    FACSIMILE TRANSMITTAL SHEET    TO: Nata   COMPANY: [facility]  FAX NUMBER: [phone]        FROM: [facility]/Wendi  PHONE: [phone]  DATE: 09/10/24      _____URGENT _____REVIEW ONLY _____PLEASE COMMENT____PLEASE REPLY    NOTES/COMMENTS: Discharge order for BRANDO Ribeiro                                      IF YOU DID NOT RECEIVE THE CORRECT NUMBER OF PAGES OR THE FAX DID NOT COME THROUGH CLEARLY, PLEASE CALL THE SENDER     CONFIDENTIALITY STATEMENT: Confidential information that may accompany this transmission contains protected health information under state and federal law and is legally privileged. This information is intended only for the use of the individual or entity named above and may be used only for carrying out treatment, payment or other healthcare operations. The recipient or person responsible for delivering this information is prohibited by law from disclosing this information without proper authorization to any other party, unless required to do so by law or regulation. If you are not the intended recipient, you are hereby notified that any review, dissemination, distribution, or copying of this message is strictly prohibited. If you have received this communication in error, please destroy the materials and contact us immediately by calling the number listed above. No response indicates that the information was received by the appropriate authorized party

## 2024-09-03 NOTE — PLAN OF CARE
Occupational Therapy: Orders received. Chart reviewed and discussed with care team.? Occupational Therapy not indicated due to pt with no acute OT needs at this time. Pt has 24/7 assist for all I/ADL and mobility from friend who lives with pt, along with a private pay caregiver through 2 days a week, 7 hours per week. Per chart review, friend is looking into assisted living facilities to provide higher level of care for pt's increased needs. PT following for safety w/ mobility while IP and to facilitate transition to next level of care. Defer discharge recommendations to Physical Therapy.?Will complete orders.

## 2024-09-03 NOTE — PROGRESS NOTES
SLP: Attempted to see pt for swallow evaluation. Pt refusing food/drink today per discussion with staff, and pt was sleeping heavily when I attempted for swallow evaluation. Per pt's caregiver, Odalis, she eats easy-to-chew solids and thin liquids (single sips) at baseline with no significant concerns for swallowing. Pt did have VFSS in 2018 which revealed small volume aspiration with thin liquids on consecutive sips. A regular diet and mildly thick liquids was recommended, however Odalis reports that pt transitioned back to thin liquids and has been tolerating without concerns for years.     Given inability to complete swallow evaluation today, will place diet of full liquids (0) with 1:1 assist and re-attempt evaluation tomorrow as appropriate. Staff to hold PO until pt is alert.

## 2024-09-03 NOTE — CONSULTS
[facility]  WO Nurse Inpatient Assessment     Consulted for: sacrum    Summary: Sacrum and coccyx intact. Pressure injury vs trauma on right buttock and posterior right thigh present on admit.     Patient History (according to provider note(s):      Mariel Ribeiro is a 79 year old female admitted on 2024. She has PMH of BELEN, HLD, CAD s/p PCI and CABG (2018), HFrEF (20-25%) s/p CardioMem implant, DMII, HTN, CKDIII, COPD, migraines, hx of thyroid cancer s/p thyroidectomy w/ resultant hypothyroidism, gout, recurrent falls, left breast DCI, recent admission (-24 ) for AMS in setting of HF and UTI who presents from home for evaluation of encephalopathy, poor PO intake, and inability to ambulate or transfer. Patient admitted to Medicine for further evaluation and care.      Assessment:      Areas visualized during today's visit: Sacrum/coccyx     cleft: dark linear line that is intact. Sacrum and coccyx are intact.       Pressure Injury Location: Right buttock    Last photo: 9/3/24  Wound type: Pressure Injury vs trauma to fleshy portion of buttock.    Pressure Injury Stage: Deep Tissue Pressure Injury (DTPI), present on admission if pressure     This is a Medical Device Related Pressure Injury (MDRPI) due to  unknown device  Wound history/plan of care: Pt unable to give history. Unclear if this is due to a medical device such as a lift strap. Also unclear if due to pressure vs trauma.    Wound base: 100 % Maroon,      Palpation of the wound bed: normal      Drainage: none     Description of drainage: none     Measurements (length x width x depth, in cm) 4.2  x 0.8  x  0 cm and 0.7 x 0.7 x 0 cm   Periwound skin: Intact      Color: normal and consistent with surrounding tissue      Temperature: normal   Odor: none  Pain: denies  and no grimacing or signs of discomfort, none  Pain intervention prior to dressing change: slow and gentle cares   Treatment goal:  Heal   STATUS: initial assessment  Supplies ordered: supplies stored on unit, discussed with RN, and discussed with patient     My PI Risk Assessment     Sensory Perception: 2 - Very Limited     Moisture: 1 - Constantly moist     Activity: 2 - Chairfast     Mobility: 2 - Very limited     Nutrition: 3 - Adequate     Friction/Shear: 1 - Problem     TOTAL: 11     Pressure Injury Location: Right posterior thigh    Last photo: 9/3/24  Wound type: Pressure Injury vs trauma     Pressure Injury Stage: 2, present on admission      This is a Medical Device Related Pressure Injury (MDRPI) due to  unknown device  Wound history/plan of care:   Pt unable to give history. Unclear if this is due to a medical device such as a lift strap. Also unclear if due to pressure vs trauma. Of note there is no discoloration/injury to left posterior thigh.   Proximal discoloration (to left in photo) is intact.   Distal wound (to right in photo) is partially open  Wound base: 95 % Maroon, 5 % Dermis     Palpation of the wound bed: normal      Drainage: none     Description of drainage: none     Measurements (length x width x depth, in cm)   See photo for overall wound. Open area 0.4  x 2  x  0.1 cm   Periwound skin: Intact      Color: normal and consistent with surrounding tissue      Temperature: normal   Odor: none  Pain: denies  and no grimacing or signs of discomfort, none  Pain intervention prior to dressing change: slow and gentle cares   Treatment goal: Protection  STATUS: initial assessment  Supplies ordered: discussed with RN and discussed with patient     My PI Risk Assessment     Sensory Perception: 2 - Very Limited     Moisture: 1 - Constantly moist     Activity: 2 - Chairfast     Mobility: 2 - Very limited     Nutrition: 3 - Adequate     Friction/Shear: 1 - Problem     TOTAL: 11    Treatment Plan:     Right buttock and posterior thigh wound(s): Daily   OK to leave open to air. If posterior right thigh wound drains, apply mepilex and  "change every 3 days. Continue preventative sacral mepilex.     Pressure Injury Prevention (PIP) Plan:  HOB: Maintain at or below 30 degrees, unless contraindicated  Repositioning in bed: Every 1-2 hours  and Raise foot of bed prior to raising head of bed, to reduce patient sliding down (shear)  Heels: Keep elevated off mattress and Pillows under calves  Protective Dressing: Sacral Mepilex for prevention (#[id number]),  especially for the agitated patient   Chair positioning: Chair cushion (#[id number])    If patient has a buttock pressure injury, or high risk for PI use chair cushion or SPS.  Moisture Management: Perineal cleansing /protection: Follow Incontinence Protocol and Avoid brief in bed  Under Devices: Inspect skin under all medical devices during skin inspection , Ensure tubes are stabilized without tension, and Ensure patient is not lying on medical devices or equipment when repositioned  Ask provider to discontinue device when no longer needed.  If patient is declining pressure injury prevention interventions: Explore reason why and address patient's concerns, Educate on pressure injury risk and prevention intervention(s), If patient is still declining, document \"informed refusal\" , and Ensure Care team is aware ( provider, charge nurse, etc)      Orders: Written    RECOMMEND PRIMARY TEAM ORDER: None, at this time  Education provided: plan of care and wound progress  Discussed plan of care with: Patient and Nurse  WOC nurse follow-up plan: weekly  Notify WOC if wound(s) deteriorate.  Nursing to notify the Provider(s) and re-consult the WOC Nurse if new skin concern.    DATA:     Current support surface: Standard  Standard gel mattress (Isoflex)  Containment of urine/stool: Incontinence Protocol  BMI: Body mass index is 44.48 kg/m .   Active diet order: Orders Placed This Encounter      Advance Diet as Tolerated: Clear Liquid Diet     Output: No intake/output data recorded.     Labs:   Recent Labs   Lab " 09/03/24  0654   ALBUMIN 3.5   HGB 12.9   WBC 6.3     Pressure injury risk assessment:   Sensory Perception: 3-->slightly limited  Moisture: 3-->occasionally moist  Activity: 2-->chairfast  Mobility: 2-->very limited  Nutrition: 3-->adequate  Friction and Shear: 2-->potential problem  Willie Score: 15    Carolann Burnham RN CWOCN  Pager no longer is use, please contact through [employer]   [facility] [facility] [place]: [phone]

## 2024-09-03 NOTE — PLAN OF CARE
Goal Outcome Evaluation:      Plan of Care Reviewed With: patient    Overall Patient Progress: no change Overall Patient Progress: no change     Pt is alert; unable to assess orientation due to speech difficulties. Patient did not display any signs of discomfort except during repositioning. Patient is incontinent of bowel and bladder; incontinence care done x1 this shift. Patient was uncooperative this shift; refused oral meds and did not want to be turned/cleaned up during incontinence episodes; MD notified.     Activity: Assist x3 for turning and incontinence care   Diet: Full liquid diet   LDAs: R PIV  Skin: Scattered bruising. Redness to perineum and sacrum. WOC nurse saw her this shift   Goals for next shift: Encourage oral intake and promote rest

## 2024-09-03 NOTE — PLAN OF CARE
Problem: Adult Inpatient Plan of Care  Goal: Plan of Care Review  Description: The Plan of Care Review/Shift note should be completed every shift.  The Outcome Evaluation is a brief statement about your assessment that the patient is improving, declining, or no change.  This information will be displayed automatically on your shift  note.  Flowsheets (Taken 9/3/2024 1531)  Plan of Care Reviewed With: caregiver  Goal: Readiness for Transition of Care  Recent Flowsheet Documentation  Taken 9/3/2024 1500 by Aidee Mason BSW  Transportation Anticipated: agency  Concerns to be Addressed: discharge planning  Intervention: Mutually Develop Transition Plan  Recent Flowsheet Documentation  Taken 9/3/2024 1500 by Aidee Mason BSW  Readmission Within the Last 30 Days: no previous admission in last 30 days  Transportation Anticipated: agency  Transportation Concerns: none  Concerns to be Addressed: discharge planning  Patient/Family Anticipated Services at Transition: none  Patient/Family Anticipates Transition to: assisted living  Offered/Gave Vendor List: no  Equipment Currently Used at Home:   cane, straight   walker, rolling   commode chair   wheelchair, manual

## 2024-09-03 NOTE — PLAN OF CARE
Goal Outcome Evaluation:      Plan of Care Reviewed With: patient     Lino and oriented, incontinent of bowel and bladder,  purewick in place. No BM. Troponin level was high, paged new order obtained. Purewick in place, no BM, mepilex on coccyx. Take med whole with apple sauce, needs reminder.  Continue POC.
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
## 2024-09-03 NOTE — PHARMACY-ADMISSION MEDICATION HISTORY
Pharmacist Admission Medication History    Admission medication history is complete. The information provided in this note is only as accurate as the sources available at the time of the update.    Information Source(s): Caregiver via phone    Pertinent Information: Interview was conducted with Odalis Lowry, [phone], who manages all of patient's medication..    Changes made to PTA medication list:  Added: None  Deleted: None  Changed: None    Allergies reviewed with patient and updates made in EHR: no    Medication History Completed By: Vaibhav Hernandez [facility] 9/2/2024 8:08 PM    PTA Med List   Medication Sig Last Dose    acetaminophen (TYLENOL) 500 MG tablet Take 1,000 mg by mouth 2 times daily 9/1/2024 at PM    anastrozole (ARIMIDEX) 1 MG tablet Take 1 tablet (1 mg) by mouth daily 9/1/2024 at AM    aspirin (ASA) 81 MG EC tablet Take 1 tablet (81 mg) by mouth daily 9/1/2024 at AM    atorvastatin (LIPITOR) 40 MG tablet TAKE 1 TABLET(40 MG) BY MOUTH IN THE MORNING 9/1/2024 at AM    bumetanide (BUMEX) 1 MG tablet Take 4 tablets (4 mg) by mouth daily 9/1/2024 at AM    colchicine (COLCRYS) 0.6 MG tablet Take 1 tablet (0.6 mg) by mouth daily as needed for gout pain More than a month    diclofenac (VOLTAREN) 1 % topical gel Apply 2 g topically as needed for moderate pain To right wrist More than a month    EPINEPHrine (ANY BX GENERIC EQUIV) 0.3 MG/0.3ML injection 2-pack Inject 0.3 mLs (0.3 mg) into the muscle as needed for anaphylaxis May repeat one time in 5-15 minutes if response to initial dose is inadequate. More than a month    febuxostat (ULORIC) 80 MG TABS tablet Take 1 tablet (80 mg) by mouth daily 9/1/2024 at AM    ferrous gluconate (FERGON) 324 (38 Fe) MG tablet TAKE 1 TABLET BY MOUTH EVERY MONDAY, WEDNESDAY, AND FRIDAY MORNING. Past Week    insulin glargine (LANTUS SOLOSTAR) 100 UNIT/ML pen Inject 5 Units subcutaneously every morning 9/1/2024 at AM    levothyroxine (SYNTHROID) 150 MCG tablet Take 1 tablet (150  mcg) by mouth daily 2024 at AM    losartan (COZAAR) 25 MG tablet Take 1 tablet (25 mg) by mouth daily 2024 at AM    metoprolol succinate ER (TOPROL XL) 25 MG 24 hr tablet Take 1 tablet (25 mg) by mouth every evening 2024 at PM    miconazole (MICATIN/MICRO GUARD) 2 % external powder Apply topically as needed for itching or other Redness in bilateral groin and  clef Past Week    mirabegron (MYRBETRIQ) 25 MG 24 hr tablet Take 1 tablet (25 mg) by mouth daily 2024 at AM    nitroGLYcerin (NITROSTAT) 0.4 MG sublingual tablet For chest pain place 1 tablet under the tongue every 5 minutes for 3 doses. If symptoms persist 5 minutes after 1st dose call 911. More than a month    nystatin (MYCOSTATIN) 662561 UNIT/GM external ointment Apply topically 2 times daily Apply to external vagina 2 times daily. (Patient taking differently: Apply topically as needed. Apply to external vagina 2 times daily.) More than a month    nystatin POWD 1 Dose 4 times daily (Patient taking differently: Apply 1 Dose topically as needed.) More than a month    polyethylene glycol (MIRALAX) 17 GM/Dose powder Take 17 g by mouth 2 times daily Past Week    potassium chloride malcolm ER (KLOR-CON M10) 10 MEQ CR tablet TAKE 2 TABLETS(20 MEQ) BY MOUTH TWICE DAILY 2024 at PM    pregabalin (LYRICA) 100 MG capsule TAKE 1 CAPSULE(100 MG) BY MOUTH TWICE DAILY 2024 at PM    PROAIR  (90 Base) MCG/ACT inhaler INHALE 2 PUFFS INTO THE LUNGS EVERY 6 HOURS (Patient taking differently: 1-2 puffs every 6 hours as needed for shortness of breath.) Past Month    venlafaxine (EFFEXOR XR) 150 MG 24 hr capsule Take 1 capsule (150 mg) by mouth daily 2024 at AM

## 2024-09-03 NOTE — CONSULTS
Care Management Initial Consult    General Information  Assessment completed with: Friend,  (Odalis)  Type of CM/SW Visit: Initial Assessment    Primary Care Provider verified and updated as needed: No   Readmission within the last 30 days: no previous admission in last 30 days      Reason for Consult:  (elevated risk score)  Advance Care Planning: Advance Care Planning Reviewed: present on chart        Communication Assessment  Patient's communication style: spoken language (English or Bilingual)        Cognitive  Cognitive/Neuro/Behavioral: .WDL except, mood/behavior, motor response, orientation  Level of Consciousness: confused     Orientation: other (see comments) (LINDA; pt will not speak)        Speech: garbled    Living Environment:   People in home: friend(s)   (Odalis)  Current living Arrangements: house      Able to return to prior arrangements: no ([facility] is working on placement with an agency)     Family/Social Support:  Care provided by: friend, homecare agency (Private pay caregiver 2 times weekly, 7 hours per week)  Provides care for: no one, unable/limited ability to care for self  Marital Status: Single  Support system: Sibling(s), Friend          Description of Support System: Involved, Supportive    Support Assessment: Adequate family and caregiver support, Adequate social supports    Current Resources:   Patient receiving home care services: Yes  Skilled Home Care Services: Skilled Nursing, Physical Therapy, Occupational Therapy     Community Resources: None  Equipment currently used at home: cane, straight, walker, rolling, commode chair, wheelchair, manual  Supplies currently used at home: Incontinence Supplies    Employment/Financial:  Employment Status: retired        Financial Concerns: none   Referral to Financial Worker: No     Does the patient's insurance plan have a 3 day qualifying hospital stay waiver?  No    Lifestyle & Psychosocial Needs:  Social Determinants of Health     Food Insecurity:  Low Risk  (1/8/2024)    Food Insecurity     Within the past 12 months, did you worry that your food would run out before you got money to buy more?: No     Within the past 12 months, did the food you bought just not last and you didn t have money to get more?: No   Depression: At risk (6/10/2024)    PHQ-2     PHQ-2 Score: 4   Housing Stability: Low Risk  (1/8/2024)    Housing Stability     Do you have housing? : Yes     Are you worried about losing your housing?: No   Tobacco Use: Medium Risk (7/10/2024)    Patient History     Smoking Tobacco Use: Former     Smokeless Tobacco Use: Never     Passive Exposure: Not on file   Financial Resource Strain: Low Risk  (1/8/2024)    Financial Resource Strain     Within the past 12 months, have you or your family members you live with been unable to get utilities (heat, electricity) when it was really needed?: No   Alcohol Use: Not on file   Transportation Needs: High Risk (1/8/2024)    Transportation Needs     Within the past 12 months, has lack of transportation kept you from medical appointments, getting your medicines, non-medical meetings or appointments, work, or from getting things that you need?: Yes   Physical Activity: Not on file   Interpersonal Safety: Not on file   Stress: Not on file   Social Connections: Not on file   Health Literacy: Not on file     Functional Status:  Prior to admission patient needed assistance:   Dependent ADLs:: Ambulation-walker, Bathing, Dressing, Grooming, Incontinence, Wheelchair-with assist, Transfers, Toileting  Dependent IADLs:: Cleaning, Cooking, Laundry, Shopping, Meal Preparation, Medication Management, Money Management, Transportation     Mental Health Status:  Mental Health Status: Other (see comment) (Unable to assess)       Chemical Dependency Status:  Chemical Dependency Status: Other (see comment) (Unable to assess)           Values/Beliefs:  Spiritual, Cultural Beliefs, Denominational Practices, Values that affect care: no           Values/Beliefs Comment:  (Evangelical)    Discussed  Partnership in Safe Discharge Planning  document with patient/family: No    Additional Information:    Met with pt caregiver and friend Odalis outside of pt room due to pt being covid positive and confused.    Confirmed address - lives in house with friend/[facility] Odalis who cares for pt along with a private pay caregiver through [employer] (2 days a week, 7 hours per week). Discharged from [facility] 8.30.24 with RN/PT/OT through [facility]    Odalis is working with an agency for skilled nursing placement with specialized services. Odalis is touring places tomorrow with this agency and will inform care management where to send referrals for placement. Pt will need hospital bed and wheelchair at skilled nursing.    Next Steps:     -Referrals for skilled nursing to be placed tomorrow after Odalis tours facilities.  -Hospital bed and w/c needed for [facility] placement.    EUFEMIA Velásquez BSTYESHA  Float   [facility]  [place] WB: [phone]

## 2024-09-03 NOTE — PROGRESS NOTES
"6MS ADMISSION    D: Patient admitted/transferred from  ED [place] via  EMS.     I: Upon arrival to the unit patient was oriented to room, unit, and call light. Patient s height, weight, and vital signs were  not obtained because it was the end the shift , upcoming nurse is aware. Provider notified of patient s arrival on the unit. Adult AVS not  completed. Because it was the end of shift.    A: Vital signs stable upon admission.  Two RN skin assessment completed yes.Second RN was JOANA Payan Significant Skin Findings include scattered bruises , wound on the coccyx area  [facility] Nurse Consult Ordered No .  P: Continue to monitor patient s Vitals sign and intervene as needed. Continue with plan of care. Notify provider with any concerns or changes in patient status.   BP (!) 160/99 (BP Location: Left arm, Patient Position: Semi-Moscoso's)   Pulse 90   Temp 98.3  F (36.8  C) (Oral)   Resp 21   Ht 1.676 m (5' 6\")   Wt 125 kg (275 lb 9.2 oz)   LMP  (LMP Unknown)   SpO2 97%   BMI 44.48 kg/m     "

## 2024-09-03 NOTE — PROGRESS NOTES
[facility]    Medicine Progress Note - Hospitalist Service, GOLD TEAM 17    Date of Admission:  9/2/2024    Assessment & Plan   Mariel Ribeiro is a 79F w/ h/o recurrent falls, CABG x 3V in 2018 (LIMA-> LAD, SVG->OM2 and RPDA), HFpEF (EF 20-25%), COPD, migraines, morbid obesity, CKD3, T2DM admitted on 9/2/2024 with encephalopathy and found to have COVID-19.      #  Hypoxia due to COVID-19 viral infection     Symptom Onset Potentially 8/31/24   Date of 1st Positive Test 9/2/24   Vaccination Status Fully Vaccinated         ---   COVID-19 special precautions, continuous pulse-ox  ---   Oxygen: continue current support with nasal cannula at 1 L/min; titrate to keep SpO2 between 90-96%  ---   Labs: Labs notable for elevated BNP, elevated CRP, troponin, elevated serum creatinine    ---   Imaging:  CXR with no acute airspace disease, diffuse pulm edema vs  atelectasis not significantly changed since 7/16/24.  ---   Breathing treatments:  No inhalers needed; avoid nebulizers in favor of MDIs   ---   IV fluids: not indicated at this time, but low threshold for poor po intake  ---   Antibiotics: not indicated   ---   COVID-Focused Medications:  Remdesivir x 5 days or until hospital discharge, started on 9/2.  Start Decadron 6 mg IV daily since pt is on 1 NC O2 supplement even though there is no evidence of COVID-19 pneumonia  - DVT Prophylaxis:   At high risk of thrombotic complications due to COVID-19.  Start sq heparin      #  Acute toxic encephalopathy  ---   Has known history of encephalopathy in the setting of infections.    ---   Recent h/o acute cystitis w/ group B strep and Pseudomonas for which she was hospitalized 7/16 - 7/29/24.  D/c'd to TCU where she stayed for a month  ---   Has baseline aphasia, worse since discharge from TCU 3 days ago  ---   High risk for stroke.  CODE STROKE was activated in the ED and was seen by Neurology consult service.    ---   CT head w/o  contrast on admission without acute findings. Pt was not cooperative and they had to tape her to the table to obtain CT hade w/o contrast  ---   Has contrast allergy thus CTA was not done   ---   MRI/MAR brain and coratid order for this morning but pt refused to undergo imaging studies.  She has also refused to take her oral meds  ---   She will require intubation in order to undergo MRI-will hold on that for now  ---   Pt's symptoms are not consistent with a large thrombus.  ---   Per discussion with neurology pt is not a candidate for tPA  ---   PT/OT/SLP consulted     #  Chronic HFrEF (EF 20-25%)  ---   BNP elevated to 7,631 and CXR w/ pulm edema   ---   HF exacerbation could be contributing to her weakness.   ---   No increased dyspnea reported  ---   She is not currently on SGLT2 given her history of UTIs.   ---   Continue PTA bumex 4mg daily  ---   Continue PTA Metoprolol succinate 25mg daily  ---   Continue PTA losartan 25mg daily  ---   TTE today  ---   F/u w/ core cardiology clinic after discharge  ---   Cardiology service consulted     #  CAD s/p CABG X 3V  #  Troponinemia  ---   CAD s/p PCI x 2 to LAD and CABG X 3V in 2018.   ---   No cardiac symptoms, CP free  ---   EKG on admission is non-ischemic.   ---   Trop elevated to 79 ---> 116 ---> 107 in the setting of above  ---   Troponinemia is due to demand-supply mismatch  ---   Reintroduce PTA Aspirin  ---   Continue PTA Lipitor 40mg daily  ---   Awaiting for TTE and cardiology consult rec     #  T2DM  ---   Fasting glucose was 157 this am  ---   Pt is now on Decadron  ---   Reintroduce PTA lantus 5 units daily  ---   Continue MSSI  ---   Hypoglycemic protocol     #  Hypothyroidism  ---   Continue PTA levothyroxine 150mcg    #  CKD3  ---   Baseline Cr 1.3 - 1.6  ---   Admit Cr was 1.31 ---> 1.27 ---> 1.29  ---   Creatinine is at baseline  ---   Avoid nephrotoxins than PTA Bumex and losartan         #  Functional urinary incontinence  ---   Jenn  placed              Diet:  NPO for now.  SLP eval requested.    DVT Prophylaxis:  Subcutaneous heparin  Gutierrez Catheter: Not present  Lines: None     Cardiac Monitoring: None  Code Status: No CPR- Do NOT Intubate    Diet: Advance Diet as Tolerated: Clear Liquid Diet    Disposition Plan   Medically Ready for Discharge: Anticipated in 2-4 Days          Ricarda Gastelum MD  Hospitalist Service, GOLD TEAM 17  [facility]  Securely message with [place] (more info)  Text page via [employer] Paging/Directory   See signed in provider for up to date coverage information  ______________________________________________________________________    Interval History   Pt moves all 4 extremities  Speech soft but not slurred  Does not follow command appropriately  Refused to take any oral medications  She is not cooperative thus unable to undergo MRI/MRA brain and carotids without intubation  On 1 L NC O2 supplement  Denies CP or SOB      Physical Exam   Vital Signs: Temp: 98.3  F (36.8  C) Temp src: Oral BP: (!) 154/107 Pulse: 89   Resp: 21 SpO2: 97 % O2 Device: Nasal cannula Oxygen Delivery: 2 LPM  Weight: 275 lbs 9.2 oz  General:  awake, alert, does not follow command appropriately, NAD.  HEENT:  NC/AT, neck supple  CVS:  NL s 1 and s2, no m/r/g.  Lungs:  CTA B/L but decreased anteriorly   Abd:  Soft, + bs, NT  Ext:  No c/c.  Lymph:  No edema.  Neuro:  Nonfocal.  Musculoskeletal: No calf tenderness to palpation.    Skin:  No rash.           Data     I have personally reviewed the following data over the past 24 hrs:    6.3  \   12.9   / 159     146 (H) 104 26.6 (H) /  151 (H)   3.9 29 1.29 (H) \     ALT: 24 AST: 31 AP: 127 TBILI: 0.6   ALB: 3.5 TOT PROTEIN: 6.5 LIPASE: N/A     Trop: 107 (HH) BNP: 12,964 (H)     TSH: 1.18 T4: N/A A1C: N/A     Procal: 0.08 CRP: 40.30 (H) Lactic Acid: N/A         Imaging results reviewed over the past 24 hrs:   Recent Results (from the past 24 hour(s))   XR Chest  Port 1 View    Narrative    EXAM: XR CHEST PORT 1 VIEW 9/2/2024 12:44 PM    INDICATION: AMS, coarse breath sounds    COMPARISON: Chest radiograph 7/16/2024, 7/12/2022, CT 11/12/2019    TECHNIQUE: Single portable semiupright AP view of the chest.    FINDINGS:   Postsurgical changes of CABG with sternotomy wires intact. Scattered  mediastinal and paratracheal surgical clips in stable position.  Convexity of the AP window compatible with pulmonary artery  enlargement. Mediastinal fullness in the superior left paratracheal  region just above the aortic knob. Bandlike opacity right lateral of  the mid thoracic trachea, compatible with accessory azygos  fissure/lobe seen on prior CT. Diffuse interstitial opacities  bilaterally. No evidence of focal consolidation, pneumothorax or  pleural effusion.      Impression    IMPRESSION:   1. No acute airspace disease. Diffuse pulmonary edema versus  atelectasis not significantly changed since 7/16/2024.    I have personally reviewed the examination and initial interpretation  and I agree with the findings.    ASHLYN HIGH MD         SYSTEM ID:  [id number]   CT Head w/o Contrast    Narrative    CT HEAD W/O CONTRAST 9/2/2024 5:31 PM    History:  stroke  ICD-10:     Comparison: CT head 7/16/2024     Technique: Using multidetector thin collimation helical acquisition  technique, axial, coronal and sagittal CT images from the skull base  to the vertex were obtained without intravenous contrast.     Findings: There is no intracranial hemorrhage, mass effect or midline  shift. There is no focal loss of gray-white matter differentiation,  insular ribbon sign, or focally hyperdense artery to suggest acute  infarction periventricular and subcortical white matter  hypoattenuation is nonspecific, but not significantly changed compared  to 7/16/2024. Mild generalized cerebral atrophy.. The ventricles are  proportionate to the cerebral sulci and unchanged compared to prior.    The bony  calvaria and the bones of the skull base appear normal. The  visualized portions of the paranasal sinuses and mastoid air cells are  unremarkable.       Impression    Impression:   1. No acute intracranial hemorrhage.  2. Mild Leukoaraiosis and mild generalized cerebral atrophy.  3. ASPECT Score: 10/10.    [Stroke Code Result: Negative]    Finding was identified on 9/2/2024 6:09 PM.     Jostin Muñoz was contacted by me on 9/2/2024 6:09 PM and verbalized  understanding of the result.    [facility] Stroke Code Communication Guidelines:  [place] ED: [phone] (EB ED)  [place] Inpatient: [phone] (Resident Pager)   or [place] 'Stroke First Call Resident [[place]]' or [facility] 0979  [facility] ED: [phone] ([facility] ED)  [facility] Inpatient: Page [phone]    BRUCE MICHEL MD         SYSTEM ID:  [id number]
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
## 2024-09-04 NOTE — PLAN OF CARE
Goal Outcome Evaluation: Ongoing   VS: T 97.4 Axillary, HR 68, RR 18, /78   O2: 97% 1 liter nasal cannula. None productive, wet sounding cough.    Output: Voiding into purewick. Purwick changed at 0930 today.    Last BM: Small 9/4/24   Activity: Bedrest, turned and repositioned every 2 hours.    Skin: Small pencil eraser size on buttocks plus additional open area.  Reddened, raised area on right thigh likely due to diaper. [facility] nurse to be consulted. Multiple, scattered bruises. See media tab.   Pain: None endorsed. Looks comfortable.    Neuro: Generalized weakness. Mostly nonverbal. Aphasic    Dressing: Mepilex on coccyx for protection.    Diet: Regular diet with thin liquids. Refusing all nourishment.   LDA: PIV right arm    Equipment: Wheelchair, purewick   Plan: Palliative care consult today.    Additional Info: Refusing all food/fluids and medications. Attempted to do oral cares several times this shift but Mariel holds her mouth shut. Vaseline to lips. New bed ordered for her tonight. She is fitting to close to the edge of the bed so elise bed ordered with mattress.          Plan of Care Reviewed With: patient    Overall Patient Progress: decliningOverall Patient Progress: declining

## 2024-09-04 NOTE — PLAN OF CARE
Physical Therapy: Orders received. Chart reviewed and discussed with care team.? Physical Therapy not indicated due to discussion w/ pt's POA/roommate Odalis and pt about GOC and PT services. Odalis initially wanted PT to evaluate as pt historically responded well to PT here but after further discussion w/ her providers they ultimately are leaning more towards hospice/[facility] route and at [facility] pt will [facility] based. Pt and Odalis familiar w/ [facility] and she has tolerated this well in the past so they have no concerns. Pt to be [facility] lift Ax2 for mobility for IP stay and PT supportive of pt and family goals of discharge to [facility].? Defer discharge recommendations to medical team.? Will complete orders.

## 2024-09-04 NOTE — PROGRESS NOTES
Care Management Follow Up    Length of Stay (days): 2    Expected Discharge Date: 9/9/24     Concerns to be Addressed: Discharge Planning       Patient plan of care discussed at interdisciplinary rounds: Yes    Anticipated Discharge Disposition: Assisted Living     Anticipated Discharge Services: None    Anticipated Discharge DME: Wheelchair, Bed    Patient/family educated on Medicare website which has current facility and service quality ratings: No    Education Provided on the Discharge Plan: Yes    Patient/Family in Agreement with the Plan: Yes    Referrals Placed by CM/SW: Assisted Living     Private pay costs discussed: Not applicable    Discussed  Partnership in Safe Discharge Planning  document with patient/family: No     Handoff Completed: No, handoff not indicated or clinically appropriate    Additional Information:    Writer sent referral to Assisted Living Facility of significant other and patient's choice which is [facility]. Writer spoke with admissions and provided them with contact information and asked them to call if they did not receive the information or if they needed additional information. Facility will not be able to admit patient until 9/9 due to COVID+ status. Writer updated significant other of this information. [facility] is the agency that family is interested in to follow patient at discharge.     Next Steps:     Writer to work with Assisted Living to coordinate discharge. Writer to work with Hospice to set up service and confirm they have equipment in place. Writer to continue to follow for support and discharge planning needs that arise.     SAMANTHA Dyson, MSW  6th Floor Medical Surgical Unit  Phone [phone]      Message me securely in [employer]

## 2024-09-04 NOTE — PLAN OF CARE
"Goal Outcome Evaluation:       Patient is A and non verbal; refused all PO mrdications; IV medications given; purewick intact; A x 2-3 with turn, reposition, and bed mobility; will continue with POC; call light is within reach.  /76 (BP Location: Left arm, Patient Position: Semi-Moscoso's)   Pulse 92   Temp 98.5  F (36.9  C) (Axillary)   Resp 21   Ht 1.676 m (5' 6\")   Wt 125 kg (275 lb 9.2 oz)   LMP  (LMP Unknown)   SpO2 97%   BMI 44.48 kg/m      Ben Lemus RN                   "

## 2024-09-04 NOTE — PROGRESS NOTES
09/04/24 1300   Appointment Info   Signing Clinician's Name / Credentials (SLP) No Kennedy, SLP   Appointment Canceled Reason (SLP) Family declined   Appointment Cancel Comments (SLP) Spoke to Primary Health Care Agent Odalis Lowry outside of room. She kindly declined swallow eval stating that the order can be DC'd since she is not eating and will be going on Hospice. Will dc ST consult order.

## 2024-09-04 NOTE — CONSULTS
Palliative Care Consultation Note  [facility]    Patient: Mariel Ribeiro  Date of Admission:  9/2/2024    Requesting Clinician / Team: Hospitalist  Reason for consult: Goals of care     Recommendations & Counseling     GOALS OF CARE:   Life-prolonging with limits (DNR/DNI), planning to enroll in hospice care on discharge  Mariel is unable to participate in goals of care conversations due to baseline aphasia and delirium in setting of progressive cognitive and functional decline.  Odalis (HCA) shared she has found a group home for Mariel in [place]. Odalis has been Mariel's caregiver at home for many years and has witnessed Mariel's cognitive and functional decline. Mariel is unfortunately unable to remain at home due to inability to transfer with assist of 1.  Odalis is interested in hospice care upon discharge. We discussed differences between Palliative and Hospice care and how hospice could support Mariel and Odalis in the community.  Odalis does not believe returning to the hospital for further testing, imaging or procedures is aligned with Mariel's goals of care at this point. She would consider oral antibiotics but no feeding tube. Based on these preferences and Mariel's clinical condition, I believe hospice care is appropriate.  Appreciate SW/RNCC assistance with discharge planning and hospice referral.    ADVANCE CARE PLANNING:  Patient has an advance directive dated 1/28/2014.  Primary Health Care Agent Odalis Lowry.  Alternate(s) Les Gonzales (sister).   New POLST form completed today. Sent to [employer] for upload, original provided to Odalis.  Code status: No CPR- Do NOT Intubate    MEDICAL MANAGEMENT:   #Chronic pain 2/2 fibromyalgia and peripheral neuropathy  PTA Pregabalin 100mg BID  Tylenol PRN  START PO dilaudid solution 2-4mg q4h PRN (done) - oxycodone allergy, avoid morphine in setting of renal failure    Hospice discharge recommendations  Please send  with 3 day supply on date of discharge:  PO dilaudid solution 2-4mg q4h PRN  Lorazepam sublingual solution 0.5mg q3h PRN anxiety/restlessness  Haldol sublingual solution 1mg q6h PRN nausea, agitation or delirium  Tylenol tablets OR solution OR suppository 650mg q4h PRN mild pain or fever  Atropine sulfate 1% opth solution 1-2 drops SL Q2hrs PRN for secretions  Senna 1-2 tabs PRN constipation  Bisacodyl suppository daily PRN constipation    PSYCHOSOCIAL/SPIRITUAL SUPPORT:  Family - supported by long term friend/roommate Odalis, 2 sisters out of state, Odalis's family  Friends - many  [place] community: Worship, declined  today    Palliative Care will continue to follow.     Anca Tolliver PA-C  [facility], Palliative Care  Securely message with the Vocera Web Console (learn more here) or  Text page via [facility] Paging/Directory     Assessment      Mariel is a 78 y/o woman with history of cognitive/memory impairment, TIA, carotid stenosis, HFrEF, CAD s/p CABG, COPD, CKD, DM2, fibromyalgia, admitted 9/2 with altered mental status and found to have Covid-19 infection. After discussion with Neurology, HCA opted to forgo invasive procedures/testing as not aligned with overall goals. Appears altered mentation has been a gradual decline, now refusing anything by mouth. Palliative consulted for further goals of care discussions.    Chart review:  7/29-8/30 at TCU, discharged home with care from Odalis (roommate/HCA)  Admitted 7/16-7/29 for altered mental status attributed to UTI, worsening heart failure, discharged to TCU.  5/22 - ED visit for TIA    Today, the patient was seen for:  Palliative encounter  Goals of care  Aphasia  Altered mental status  Functional decline  HFrEF    History of Present Illness   Saw Mariel alone this morning. She was laying in bed with eyes open and tracking. Nonverbal throughout my visit but followed commands and could blink yes/no in response to questions. Appeared to endorse pain with  blinking.    Met with Odalis outside Mariel's room this afternoon.   Introduced the role of palliative care as an interdisciplinary team that cares for patients with serious illness to help support symptom management, communication, coping for patients and their families as well as support with medical decision making.    Prognosis, Goals, & Planning:   Functional Status just prior to this current hospitalization:  Outpatient Palliative Performance Score (PPS) 20%  Extensive disease. Bedbound & requires total care, minimal intake or just sips. Normal LOC or drowsy or confused..  Odalis describes Mariel's progressive cognitive and functional decline over the past several years. She has aphasia at baseline and struggled to put words together, now speaks only a word at a time and occasional phrases. Mariel was living at home with help from Odalis and visiting caregiver until recent hospitalization and TCU stay. In the days since discharge from TCU, Mariel has been unable and/or unwilling to transfer out of bed or eat/drink much at all.     Prognosis, Goals, and/or Advance Care Planning:  Education provided regarding hospice philosophy, prognostic,and eligibility criteria. Discussed what services are provided and those that are not,  Discussed common misconceptions. We explored the various disposition options where they can receive hospice care (home, residential hospice homes, LTC with hospice) including subsequent financial and familial implications. Discussed typical anticipated timing of discharge.    Code Status was addressed today:   Yes, confirmed DNR/DNI    Patient's decision making preferences: unable to assess        Patient has decision-making capacity today for complex decisions: Unreliable          Coping, Meaning, & Spirituality:   Mood, coping, and/or meaning in the context of serious illness were addressed today: Yes    Social:   Relationships: Lives with her roommate Odalis who is her primary caregiver and  health care agent. Has many sibs locally and around the country who are involved in her life; no children.  Occupation: previously a , went on disability due to pain/fibromyalgia  Activities: art (previously an artist), enjoys watching birds, socializing with loved ones    Medications:  Reviewed this patient's medication profile and medications from this hospitalization. Notable medications:  IV bumex 4mg q12h  IV dexamethasone 6mg q24h  Remdesivir  Venlafaxine 150mg daily  Pregabalin 100mg BID    [place] Board of Pharmacy Data Base Reviewed: Yes:   reviewed - controlled substances reflected in medication list.    Physical Exam   Vital Signs with Ranges  Temp:  [97.4  F (36.3  C)-98.5  F (36.9  C)] 97.4  F (36.3  C)  Pulse:  [70-92] 70  Resp:  [18-28] 18  BP: (125-160)/(57-78) 151/78  SpO2:  [97 %] 97 %  Wt Readings from Last 10 Encounters:   09/02/24 125 kg (275 lb 9.2 oz)   07/10/24 131.5 kg (290 lb)   12/01/23 131.5 kg (290 lb)   10/03/23 128 kg (282 lb 3.2 oz)   09/29/23 130.9 kg (288 lb 9.6 oz)   09/27/23 130 kg (286 lb 9.6 oz)   09/21/23 134.3 kg (296 lb)   09/14/23 133.7 kg (294 lb 12.8 oz)   09/08/23 133.8 kg (294 lb 15.6 oz)   09/05/23 138.3 kg (305 lb)     275 lbs 9.2 oz    PHYSICAL EXAM:  General: laying in bed, NAD  HEENT: Moist mucous membranes  Lungs: non labored  Abdomen: non tender non distended  Extremities: no gross abnormalities  NEURO: eyes open and tracking, +expressive aphasia, +pill rolling tremor RUE, follows simple commands, moves extremities independently  Psych: calm    Data reviewed:  CMP  Recent Labs   Lab 09/04/24  0836 09/04/24  0522 09/03/24  0904 09/03/24  0654 09/02/24  1617 09/02/24  1347   NA  --  144  --  146*   < > 141   POTASSIUM  --  4.5  --  3.9   < > 4.1   CHLORIDE  --  106  --  104   < > 103   CO2  --  25  --  29   < > 23   ANIONGAP  --  13  --  13   < > 15   * 149*   < > 157*   < > 146*   BUN  --  33.6*  --  26.6*   < > 27.6*   CR  --  1.19*  --   1.29*   < > 1.31*   GFRESTIMATED  --  47*  --  42*   < > 42*   ANTOINETTE  --  9.0  --  9.5   < > 9.5   MAG  --  1.6*  --   --   --  1.5*   PHOS  --   --   --   --   --  3.2   PROTTOTAL  --   --   --  6.5  --  6.1*   ALBUMIN  --   --   --  3.5  --  3.4*   BILITOTAL  --   --   --  0.6  --  0.6   ALKPHOS  --   --   --  127  --  125   AST  --   --   --  31  --  33   ALT  --   --   --  24  --  15    < > = values in this interval not displayed.     CBC  Recent Labs   Lab 09/04/24  0522 09/03/24  0654   WBC 3.9* 6.3   RBC 4.21 4.61   HGB 11.8 12.9   HCT 36.8 41.3   MCV 87 90   MCH 28.0 28.0   MCHC 32.1 31.2*   RDW 14.1 14.4   * 159     CTH 9/2  Impression:   1. No acute intracranial hemorrhage.  2. Mild Leukoaraiosis and mild generalized cerebral atrophy.  3. ASPECT Score: 10/10.    CXR 9/2  IMPRESSION:   1. No acute airspace disease. Diffuse pulmonary edema versus  atelectasis not significantly changed since 7/16/2024.    Medical Decision Making       MANAGEMENT DISCUSSED with the following over the past 24 hours: Medicine   NOTE(S)/MEDICAL RECORDS REVIEWED over the past 24 hours: Neuro, medicine  Medical complexity over the past 24 hours:  - Decision to DE-ESCALATE CARE based on prognosis

## 2024-09-04 NOTE — PLAN OF CARE
"Goal Outcome Evaluation:  Vitally stable, /78 (BP Location: Left arm, Patient Position: Semi-Moscoso's, Cuff Size: Adult Regular)   Pulse 70   Temp 97.7  F (36.5  C) (Axillary)   Resp 20   Ht 1.676 m (5' 6\")   Wt 125 kg (275 lb 9.2 oz)   LMP  (LMP Unknown)   SpO2 97%   BMI 44.48 kg/m      Alert / some times opens eyes to speech, but nonverbal.     Not out of bed. Repositioned every 2 hours. On bed alarm.    Purewick on for urine elimination.    Unable to access pain, not responding to questions.    Continue plan of care                      "
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
## 2024-09-04 NOTE — PROGRESS NOTES
"[facility]    Medicine Progress Note - Hospitalist Service, GOLD TEAM 17    Date of Admission:  9/2/2024    Assessment & Plan   Mariel Ribeiro is a 79F w/ h/o recurrent falls, CABG x 3V in 2018 (LIMA-> LAD, SVG->OM2 and RPDA), HFpEF (EF 20-25%), COPD, migraines, morbid obesity, CKD3, T2DM admitted on 9/2/2024 with encephalopathy and found to have COVID-19.    #  MAJOR THINKS AND CHANGES TODAY  ---   Dr. Shayna Mcneal of Neurology had an extensive discussion w/ pt's roommate and POTANYA Jacobo.  Per Dr. Mcneal's note \"Odalis is concerned that more invasive procedures (such as CEA or aggressive stroke treatment measures) may not be in line with Mariel's goals of care. 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 She does however enjoy relaxing and watching birds out the window or having loved ones around socializing. Odalis is concerned that aggressive measures may limit her ability to participate in the activities that she is still intermittently able to enjoy, which is understandable. We discussed work up for stroke including MRI/MRA, which in her case does carry some risk as I suspect she will require at least some level of sedation to stay still. We also discussed that it will likely not change medical management as she is already on aspirin, atorvastatin, and antihypertensives. She is not interested in surgical management, if indicated. We also discussed physical therapy & occupational therapy. Odalis is concerned that it is getting to the point where aggressive therapies are more detrimental than helpful. I would defer to Odalis and primary team whether palliative care referral may be beneficial to discuss goals of care. My interpretation talking with Odalis is that they are not yet at the point where they are withdrawing care but instead to pivoting more towards comfort-focused " care while maintaining less invasive/aggressive treatments.   ---   I have talked w/ Odalis on the phone today.  She confirmed above discussions w/ Dr. Mcneal.  She is in agreement for palliative service consultation and willing to discuss transitioning Mariel to comfort measures.  Odalis will be here ~ 1:30 pm  ---   Will consult w/ Palliative care team  ---   Odalis refused to take oral meds since admit.  She has not had much to eat or drink past 3 days     #  Hypoxia due to COVID-19 viral infection     Symptom Onset Potentially 8/31/24   Date of 1st Positive Test 9/2/24   Vaccination Status Fully Vaccinated         ---   COVID-19 special precautions, continuous pulse-ox  ---   Oxygen: continue current support with nasal cannula at 1 L/min; titrate to keep SpO2 between 90-96%  ---   Labs: Labs notable for elevated BNP, elevated CRP, troponin, elevated serum creatinine    ---   Imaging:  CXR with no acute airspace disease but revealed diffuse pulm edema vs  atelectasis not significantly changed since 7/16/24.  ---   Breathing treatments:  No inhalers needed; avoid nebulizers in favor of MDIs   ---   IV fluids: not indicated at this time, but low threshold for poor po intake  ---   Antibiotics: not indicated   ---   COVID-Focused Medications:  Remdesivir x 5 days or until hospital discharge, started on 9/2.  Started Decadron 6 mg IV daily on 9/3 since pt is on 1 NC O2 supplement even though there is no evidence of COVID-19 pneumonia  - DVT Prophylaxis:   At high risk of thrombotic complications due to COVID-19.  Continue sq heparin      #  Acute toxic encephalopathy  ---   Has known history of encephalopathy in the setting of infections.    ---   Recent h/o acute cystitis w/ group B strep and Pseudomonas for which she was hospitalized 7/16 - 7/29/24.  D/c'd to TCU where she stayed for a month  ---   Has baseline aphasia, worse since discharge from TCU 3 days prior to this admit  ---   High risk for stroke.  CODE STROKE  was activated in the ED and was seen by Neurology consult service.    ---   CT head w/o contrast in the ED without acute findings. Pt was not cooperative and they had to tape her to the table to obtain CT hade w/o contrast  ---   Has contrast allergy thus CTA was not done   ---   MRI/MAR brain and carotids ordered but pt refused to undergo imaging studies.  She also has been refusing to take her oral meds  ---   She will require intubation in order to undergo MRI.  Neurology consult service no longer recommending MRI  ---   Pt's symptoms are not consistent with a large thrombus.  ---   Per discussion with neurology pt is not a candidate for tPA  ---   PT/OT/SLP if pt is able to participate  ---   Continue PTA ASA and Atorvastatin   ---   Pt refused to participate in PT or SLP.  Currently on FLD per SLP rec.  Pt has been refusing to take anything by mouth.     #  Chronic HFrEF w/ acute exacerbation   ---   BNP elevated to 7,631 and CXR w/ pulm edema   ---   HF exacerbation may be contributing to pt's weakness  ---   No increased dyspnea reported because pt has been bed bound  ---   She is not currently on SGLT2 given her history of UTIs.   ---   TTE 9/3 revealed severely reduced EF 15-20%, was EF 20-25% in 7/2024  ---    PTA bumex 4mg daily  ---   Start Bumex 4 mg iv bid  ---   Continue PTA Metoprolol succinate 25mg daily  ---   Continue PTA losartan 25mg daily  ---   Cardiology consult service following  ---   Palliative service consulted for goals of care     #  CAD s/p CABG X 3V  #  Troponinemia  ---   CAD s/p PCI x 2 to LAD and CABG X 3V in 2018.   ---   No cardiac symptoms, CP free  ---   EKG on admission is non-ischemic.   ---   Trop elevated to 79 ---> 116 ---> 107 in the setting of above  ---   Troponinemia is due to demand-supply mismatch  ---   Reintroduce PTA Aspirin  ---   Continue PTA Lipitor 40mg daily  ---   TTE 9/3 revealed severely reduced EF 15-20%, no WMA     #  T2DM  ---   Fasting glucose was 141  this am  ---   Pt is now on Decadron  ---   Continue PTA lantus 5 units daily  ---   Continue MSSI  ---   Hypoglycemic protocol     #  Hypothyroidism  ---   Continue PTA levothyroxine 150mcg    #  CKD3  ---   Baseline Cr 1.3 - 1.6  ---   Admit Cr was 1.31 ---> 1.27 ---> 1.29 ---> 1.19  ---   Creatinine is at baseline  ---   Avoid nephrotoxins than Bumex and losartan         #  Functional urinary incontinence  ---   Purewick placed    #  Right buttock and posterior thigh wound  ---   Pressure Injury Stage: 2, present on admission   ---   Appreciate WOCN rec  ---   Daily:  OK to leave open to air. If posterior right thigh wound drains, apply mepilex and change every 3 days.  ---   Continue preventative sacral mepilex.            Diet:  FLD  DVT Prophylaxis:  Subcutaneous heparin  Gutierrez Catheter: Not present  Lines: None     Cardiac Monitoring: None  Code Status: No CPR- Do NOT Intubate    Diet: Room Service  Advance Diet as Tolerated: Regular Diet Adult    Disposition Plan   Medically Ready for Discharge: Anticipated in 2-4 Days          Ricarda Gastelum MD  Hospitalist Service, GOLD TEAM [address] [place] [address]  Securely message with [employer] (more info)  Text page via [employer] Paging/Directory   See signed in provider for up to date coverage information  ______________________________________________________________________    Interval History   On 1 L NC O2 supplement  Patient continued to refuse taking food or medications by mouth  She is aphasic  Chronically ill-appearing  Unable to undergo MRI/MRI brain with carotid  Has not had much to eat or drink for the past 3 days  Has not taken any oral medications since admission    Physical Exam   Vital Signs: Temp: 97.4  F (36.3  C) Temp src: Axillary BP: (!) 151/78 Pulse: 70   Resp: 18 SpO2: 97 % O2 Device: Nasal cannula Oxygen Delivery: 1 LPM  Weight: 275 lbs 9.2 oz  General:  awake, alert, does not follow command appropriately,  NAD.  HEENT:  NC/AT, neck supple  CVS:  NL s 1 and s2, no m/r/g.  Lungs:  CTA B/L but decreased anteriorly   Abd:  Soft, + bs, NT  Ext:  No c/c.  Lymph:  No edema.  Neuro:  Nonfocal.  Musculoskeletal: No calf tenderness to palpation.    Skin:  No rash.           Data     I have personally reviewed the following data over the past 24 hrs:    3.9 (L)  \   11.8   / 138 (L)     144 106 33.6 (H) /  141 (H)   4.5 25 1.19 (H) \       Imaging results reviewed over the past 24 hrs:   Recent Results (from the past 24 hour(s))   Echo Limited   Result Value    LVEF  15-20% (severely reduced)    Narrative    [id number]  [id number]  [id number]  [id number]^ANUSHA^NOÉ^MYNOR     [facility],[place]  Echocardiography Laboratory  [address] [place] [id number]     Name: ROSY PALMER  MRN: [id number]  : 1945  Study Date: 2024 03:04 PM  Age: 79 yrs  Gender: Female  Patient Location: [facility]  Reason For Study: CHF  Ordering Physician: NOÉ TAVARES  Performed By: Ivett Fink     BSA: 2.3 m2  Height: 66 in  Weight: 275 lb  HR: 94  BP: 160/99 mmHg  ______________________________________________________________________________  Procedure  Limited Portable Echo Adult.  ______________________________________________________________________________  Interpretation Summary  Left ventricular function is decreased. The ejection fraction is 15-20%  (severely reduced).  Moderate left ventricular dilation is present.  The right ventricle is normal size.  Global right ventricular function cannot be assessed, RV in subcostal view  appear moderately reduced.     This study was compared with the study from 2024 .No significant changes  noted (RV inferior wall function appear low in the previous study).  ______________________________________________________________________________  Left Ventricle  Moderate left ventricular dilation is present. Left ventricular function is  decreased. The  ejection fraction is 15-20% (severely reduced). Severe diffuse  hypokinesis is present.     Right Ventricle  The right ventricle is normal size. Global right ventricular function is  moderately reduced.     Mitral Valve  The mitral valve is normal. Trace mitral insufficiency is present.     Aortic Valve  Trace aortic insufficiency is present. No aortic stenosis is present.     Pericardium  No pericardial effusion is present.     Compared to Previous Study  This study was compared with the study from 7/24/2024 . No significant changes  noted.  ______________________________________________________________________________  MMode/2D Measurements & Calculations     EF(MOD-bp): 22.0 %     ______________________________________________________________________________  Report approved by: Aly CHAPMAN 09/03/2024 04:11 PM

## 2024-09-05 NOTE — CONSULTS
[facility]  WO Nurse Inpatient Assessment     Consulted for: sacrum  : NEW left buttock and right thigh    Summary: Sacrum and coccyx intact. Pressure injury vs trauma on right buttock and posterior right thigh present on admit.   : no injury left buttock. Injury located on right buttock. Deep tissue pressure injury to right thigh under pannus due to purewick suction tubing becoming trapped in skin fold.     Patient History (according to provider note(s):      Mariel Ribeiro is a 79 year old female admitted on 2024. She has PMH of BELEN, HLD, CAD s/p PCI and CABG (), HFrEF (20-25%) s/p CardioMem implant, DMII, HTN, CKDIII, COPD, migraines, hx of thyroid cancer s/p thyroidectomy w/ resultant hypothyroidism, gout, recurrent falls, left breast DCI, recent admission (-24 ) for AMS in setting of HF and UTI who presents from home for evaluation of encephalopathy, poor PO intake, and inability to ambulate or transfer. Patient admitted to Medicine for further evaluation and care.      Assessment:      Areas visualized during today's visit: Sacrum/coccyx    Tati cleft: dark linear line that is intact. Sacrum and coccyx are intact.       Pressure Injury Location: Right buttock    Last photo: 9/3/24  Wound type: Pressure Injury vs trauma to fleshy portion of buttock.    Pressure Injury Stage: Deep Tissue Pressure Injury (DTPI), present on admission if pressure     This is a Medical Device Related Pressure Injury (MDRPI) due to  unknown device  Wound history/plan of care: Pt unable to give history. Unclear if this is due to a medical device such as a lift strap. Also unclear if due to pressure vs trauma.    Wound base: 100 % Maroon,      Palpation of the wound bed: normal      Drainage: none     Description of drainage: none     Measurements (length x width x depth, in cm) 4.2  x 0.8  x  0 cm and 0.7 x 0.7 x 0 cm   Periwound skin: Intact      Color: normal  and consistent with surrounding tissue      Temperature: normal   Odor: none  Pain: denies  and no grimacing or signs of discomfort, none  Pain intervention prior to dressing change: slow and gentle cares   Treatment goal: Heal   STATUS: initial assessment  Supplies ordered: supplies stored on unit, discussed with RN, and discussed with patient     My PI Risk Assessment     Sensory Perception: 2 - Very Limited     Moisture: 1 - Constantly moist     Activity: 2 - Chairfast     Mobility: 2 - Very limited     Nutrition: 3 - Adequate     Friction/Shear: 1 - Problem     TOTAL: 11     Pressure Injury Location: Right posterior thigh    Last photo: 9/3/24  Wound type: Pressure Injury vs trauma     Pressure Injury Stage: 2, present on admission      This is a Medical Device Related Pressure Injury (MDRPI) due to  unknown device  Wound history/plan of care:   Pt unable to give history. Unclear if this is due to a medical device such as a lift strap. Also unclear if due to pressure vs trauma. Of note there is no discoloration/injury to left posterior thigh.   Proximal discoloration (to left in photo) is intact.   Distal wound (to right in photo) is partially open  Wound base: 95 % Maroon, 5 % Dermis     Palpation of the wound bed: normal      Drainage: none     Description of drainage: none     Measurements (length x width x depth, in cm)   See photo for overall wound. Open area 0.4  x 2  x  0.1 cm   Periwound skin: Intact      Color: normal and consistent with surrounding tissue      Temperature: normal   Odor: none  Pain: denies  and no grimacing or signs of discomfort, none  Pain intervention prior to dressing change: slow and gentle cares   Treatment goal: Protection  STATUS: initial assessment  Supplies ordered: discussed with RN and discussed with patient     My PI Risk Assessment     Sensory Perception: 2 - Very Limited     Moisture: 1 - Constantly moist     Activity: 2 - Chairfast     Mobility: 2 - Very limited      Nutrition: 3 - Adequate     Friction/Shear: 1 - Problem     TOTAL: 11    Pressure Injury Location: Right thigh under pannus    Last photo: 9/5  Wound type: Pressure Injury     Pressure Injury Stage: Deep Tissue Pressure Injury (DTPI), hospital acquired      This is a Medical Device Related Pressure Injury (MDRPI) due to  Suction tubing connected to purewick . Low suspicion this will be full thickness wound based on appearance and palpation.   Wound history/plan of care:   Parallel lines of deep maroon consistent with suction tubing to purewick being trapped under pannus. Photo includes tubing.     Wound base: 100 % Maroon,      Palpation of the wound bed: normal      Drainage: scant     Description of drainage: serosanguinous     Measurements (length x width x depth, in cm) 1  x 8.5  x  0 cm      Tunneling N/A     Undermining N/A  Periwound skin: Intact      Color: normal and consistent with surrounding tissue      Temperature: normal   Odor: none  Pain: denies , none  Pain intervention prior to dressing change: slow and gentle cares   Treatment goal: Heal  and Protection  STATUS: initial assessment  Supplies ordered: discussed with RN and discussed with patient     My PI Risk Assessment     Sensory Perception: 2 - Very Limited     Moisture: 1 - Constantly moist     Activity: 1 - Bedfast      Mobility: 2 - Very limited     Nutrition: 3 - Adequate     Friction/Shear: 1 - Problem     TOTAL: 10      Treatment Plan:     Right buttock and posterior thigh wound(s): Daily   OK to leave open to air. If posterior right thigh wound drains, apply mepilex and change every 3 days. Continue preventative sacral mepilex.     Right upper thigh under pannus: Daily  Ensure purewick tubing is not trapped under pannus.   If draining ok to apply mepilex and change every 3 days.     Pressure Injury Prevention (PIP) Plan:  HOB: Maintain at or below 30 degrees, unless contraindicated  Repositioning in bed: Every 1-2 hours  and Raise foot of  "bed prior to raising head of bed, to reduce patient sliding down (shear)  Heels: Keep elevated off mattress and Pillows under calves  Protective Dressing: Sacral Mepilex for prevention (#[id number]),  especially for the agitated patient   Chair positioning: Chair cushion (#[id number])    If patient has a buttock pressure injury, or high risk for PI use chair cushion or SPS.  Moisture Management: Perineal cleansing /protection: Follow Incontinence Protocol and Avoid brief in bed  Under Devices: Inspect skin under all medical devices during skin inspection , Ensure tubes are stabilized without tension, and Ensure patient is not lying on medical devices or equipment when repositioned  Ask provider to discontinue device when no longer needed.  If patient is declining pressure injury prevention interventions: Explore reason why and address patient's concerns, Educate on pressure injury risk and prevention intervention(s), If patient is still declining, document \"informed refusal\" , and Ensure Care team is aware ( provider, charge nurse, etc)      Orders: Written    RECOMMEND PRIMARY TEAM ORDER: None, at this time  Education provided: plan of care and wound progress  Discussed plan of care with: Patient and Nurse  WOC nurse follow-up plan: weekly  Notify WOC if wound(s) deteriorate.  Nursing to notify the Provider(s) and re-consult the WOC Nurse if new skin concern.    DATA:     Current support surface: Standard  Standard gel mattress (Isoflex)  Containment of urine/stool: Incontinence Protocol  BMI: Body mass index is 45.52 kg/m .   Active diet order: Orders Placed This Encounter      Advance Diet as Tolerated: Regular Diet Adult     Output: I/O last 3 completed shifts:  In: 200 [P.O.:200]  Out: 2100 [Urine:2100]     Labs:   Recent Labs   Lab 09/04/24  0522 09/03/24  0654   ALBUMIN  --  3.5   HGB 11.8 12.9   WBC 3.9* 6.3     Pressure injury risk assessment:   Sensory Perception: 3-->slightly limited  Moisture: 3-->occasionally " moist  Activity: 1-->bedfast  Mobility: 2-->very limited  Nutrition: 3-->adequate  Friction and Shear: 1-->problem  Willie Score: 13    Carolann Burnham RN CWOCN  Pager no longer is use, please contact through [employer] [facility] group: [facility] Nurse [facility] [place]  Dept. Office Number: [phone]

## 2024-09-05 NOTE — PLAN OF CARE
Goal Outcome Evaluation: Ongoing            Overall Patient Progress: improvingOverall Patient Progress: improving       VS: T 97.9 oral, HR 95, RR 18, /103  :  02: 97%  None productive, wet sounding cough.     Output: Voiding into purewick. Purwick changed at 0930 today.     LBM: Small 9/4/24    Activity: Bedrest, turned and repositioned every 2 hours.     Skin: Small pencil eraser size on buttocks plus additional open area.  Reddened, raised area on right thigh. WOC nurse here today.  Multiple, scattered bruises.     Pain: None endorsed. Looks comfortable.     Neuro's: Generalized weakness. Mostly nonverbal. Aphasic     Dressing: Mepilex on coccyx for protection. Nikolai bed with specialty mattress.     Diet: Regular diet with thin liquids. Refusing all nourishment.    LDA: PIV right arm TKO     Equipment: Wheelchair, purewick    Plan: Transfer to hospice on Monday.       Additional information: Ate better today. Took all medications whole with applesauce.

## 2024-09-05 NOTE — PROGRESS NOTES
[facility]    Medicine Progress Note - Hospitalist Service, GOLD TEAM 17    Date of Admission:  9/2/2024    Assessment & Plan   Mariel Ribeiro is a 79F w/ h/o recurrent falls, CABG x 3V in 2018 (LIMA-> LAD, SVG->OM2 and RPDA), HFpEF (EF 20-25%), COPD, migraines, morbid obesity, CKD3, T2DM admitted on 9/2/2024 with encephalopathy and found to have COVID-19.    #  MAJOR THINKS AND CHANGES TODAY  ---   Appreciate palliative consult recommendations  ---   Patient is currently on comfort/hospice care measures only  ---   Discontinue lab draws and vital checks  ---   D/c tele monitoring  ---   Patient remains aphasic.  However she was interacting with her roommate and was able to eat dinner last night and breakfast this morning (fed by her roommate)  ---   Awaiting for placement to hospice facility     #  Hypoxia due to COVID-19 viral infection     Symptom Onset Potentially 8/31/24   Date of 1st Positive Test 9/2/24   Vaccination Status Fully Vaccinated         ---   COVID-19 special precautions, continuous pulse-ox  ---   Oxygen: continue current support with nasal cannula at 1 L/min; titrate to keep SpO2 between 90-96%  ---   Labs: Labs notable for elevated BNP, elevated CRP, troponin, elevated serum creatinine    ---   Imaging:  CXR with no acute airspace disease but revealed diffuse pulm edema vs  atelectasis not significantly changed since 7/16/24.  ---   Breathing treatments:  No inhalers needed; avoid nebulizers in favor of MDIs   ---   IV fluids: not indicated at this time, but low threshold for poor po intake  ---   Antibiotics: not indicated   ---   COVID-Focused Medications: Continue remdesivir x 5 days or until hospital discharge, started on 9/2.  Discontinue Decadron 6 mg IV daily since patient is now off of O2 supplement  ---   There is no evidence of COVID-19 pneumonia  - DVT Prophylaxis:   At high risk of thrombotic complications due to COVID-19.  Continue sq  heparin      #  Acute toxic encephalopathy  ---   Has known history of encephalopathy in the setting of infections.    ---   Recent h/o acute cystitis w/ group B strep and Pseudomonas for which she was hospitalized 7/16 - 7/29/24.  D/c'd to TCU where she stayed for a month  ---   Has baseline aphasia, worse since discharge from TCU 3 days prior to this admit  ---   High risk for stroke.  CODE STROKE was activated in the ED and was seen by Neurology consult service.    ---   CT head w/o contrast in the ED without acute findings. Pt was not cooperative and they had to tape her to the table to obtain CT hade w/o contrast  ---   Has contrast allergy thus CTA was not done   ---   MRI/MAR brain and carotids ordered but pt refused to undergo imaging studies.  She also has been refusing to take her oral meds  ---   She will require intubation in order to undergo MRI.  Neurology consult service no longer recommending MRI  ---   Pt's symptoms are not consistent with a large thrombus.  ---   Per discussion with neurology pt is not a candidate for tPA  ---   PT/OT/SLP if pt is able to participate  ---   Continue PTA ASA and Atorvastatin   ---   Pt refused to participate in PT or SLP.  Currently on FLD per SLP rec.  Pt has been refusing to take anything by mouth.     #  Chronic HFrEF w/ acute exacerbation   ---   BNP elevated to 7,631 and CXR w/ pulm edema   ---   HF exacerbation may be contributing to pt's weakness  ---   No increased dyspnea reported because pt has been bed bound  ---   She is not currently on SGLT2 given her history of UTIs.   ---   TTE 9/3 revealed severely reduced EF 15-20%, was EF 20-25% in 7/2024  ---    PTA bumex 4mg daily  ---   Continue PTA Bumex 4 mg bid  ---   Discontinue Bumex 4 mg iv bid  ---   Continue PTA Metoprolol succinate 25mg daily  ---   Continue PTA losartan 25mg daily  ---   Patient is now on comfort/hospice care measures only     #  CAD s/p CABG X 3V  #  Troponinemia  ---   CAD s/p PCI x  2 to LAD and CABG X 3V in 2018.   ---   No cardiac symptoms, CP free  ---   EKG on admission is non-ischemic.   ---   Trop elevated to 79 ---> 116 ---> 107 in the setting of above  ---   Troponinemia is due to demand-supply mismatch  ---   Reintroduce PTA Aspirin  ---   Continue PTA Lipitor 40mg daily  ---   TTE 9/3 revealed severely reduced EF 15-20%, no WMA  ---   Patient is currently on hospice/comfort care measures only     #  T2DM  ---   Fasting glucose was 161 this am  ---   Decadron discontinued today  ---   Continue PTA lantus 5 units daily  ---   Continue MSSI  ---   Hypoglycemic protocol     #  Hypothyroidism  ---   Continue PTA levothyroxine 150mcg    #  CKD3  ---   Baseline Cr 1.3 - 1.6  ---   Admit Cr was 1.31 ---> 1.27 ---> 1.29 ---> 1.19  ---   Creatinine is at baseline  ---   Avoid nephrotoxins than Bumex and losartan         #  Functional urinary incontinence  ---   Purewick placed    #  Right buttock and posterior thigh wound  ---   Pressure Injury Stage: 2, present on admission   ---   Appreciate WOCN rec  ---   Daily:  OK to leave open to air. If posterior right thigh wound drains, apply mepilex and change every 3 days.  ---   Continue preventative sacral mepilex.            Diet:  FLD  DVT Prophylaxis:  Subcutaneous heparin  Gutierrez Catheter: Not present  Lines: None     Cardiac Monitoring: None  Code Status: No CPR- Do NOT Intubate    Diet: Room Service  Advance Diet as Tolerated: Regular Diet Adult    Disposition Plan   Medically Ready for Discharge: Anticipated in 2-4 Days          Ricarda Gastelum MD  Hospitalist Service, GOLD TEAM 17  [facility]  Securely message with [employer] (more info)  Text page via [facility] Paging/Directory   See signed in provider for up to date coverage information  ______________________________________________________________________    Interval History   On RA  A bit more interactive today but remains aphasic  Able to eat  dinner last night and breakfast this morning with the help of her friend  She is currently on hospice/comfort care measures only  While waiting for placement to a hospice facility    Physical Exam   Vital Signs: Temp: 97.9  F (36.6  C) Temp src: Oral BP: (!) 148/103 Pulse: 83   Resp: 18 SpO2: 95 % O2 Device: None (Room air)    Weight: 282 lbs 0 oz  General:  awake, alert, does not follow command appropriately, NAD.  HEENT:  NC/AT, neck supple  CVS:  NL s 1 and s2, no m/r/g.  Lungs:  CTA B/L but decreased anteriorly   Abd:  Soft, + bs, NT  Ext:  No c/c.  Lymph:  No edema.  Neuro:  Nonfocal.  Musculoskeletal: No calf tenderness to palpation.    Skin:  No rash.           Data         Imaging results reviewed over the past 24 hrs:   No results found for this or any previous visit (from the past 24 hour(s)).

## 2024-09-05 NOTE — PROGRESS NOTES
Care Management Follow Up    Length of Stay (days): 3    Expected Discharge Date: 9/9/24     Concerns to be Addressed: Discharge Planning       Patient plan of care discussed at interdisciplinary rounds: Yes    Anticipated Discharge Disposition: Assisted Living, DME     Anticipated Discharge Services: None    Anticipated Discharge DME: Wheelchair, Bed    Patient/family educated on Medicare website which has current facility and service quality ratings: N/A    Education Provided on the Discharge Plan: Yes    Patient/Family in Agreement with the Plan: Yes    Referrals Placed by CM/SW: intermediate, Hospice      Private pay costs discussed: Not applicable    Discussed  Partnership in Safe Discharge Planning  document with patient/family: No     Handoff Completed: No, handoff not indicated or clinically appropriate    Additional Information:    Writer spoke with representative at [facility]. She plans to come and complete an assessment tomorrow. She will call patient's significant other Odalis to coordinate time. Writer sent referral/order to [facility], per Odalis's request. Hospice confirmed receipt of referral and order and writer is awaiting response regarding their ability to admit.     ADDENDUM: Writer received call back from [facility] who stated that they can accept, but cannot sign on patient until Wednesday, 9/11. They can order equipment and deliver on day of discharge. They will call significant other and coordinate with her.    Next Steps:     Writer to continue to follow for support and discharge planning needs that arise.     At discharge patient will need:     Stretcher transportation scheduled  PCS completed  IMM completed  5 days of comfort medications sent with  Orders faxed to Hospice and intermediate  Discharge note written   Hand Off completed    SAMANTHA Dyson, MSTYESHA  6th Floor Medical Surgical Unit  Phone [phone]      Message me securely in [employer]

## 2024-09-05 NOTE — PLAN OF CARE
"Goal Outcome Evaluation:      Plan of Care Reviewed With: patient    Overall Patient Progress: no change    Outcome Evaluation: pt has not verbalized a word since the beginning of the shift. pt unable to communicate pain. pt checked, changed, and repo q2h. pt ate 75% of her dinner. pt has been stable overnight. pt on TELE monitoring.      Problem: Adult Inpatient Plan of Care  Goal: Plan of Care Review  Description: The Plan of Care Review/Shift note should be completed every shift.  The Outcome Evaluation is a brief statement about your assessment that the patient is improving, declining, or no change.  This information will be displayed automatically on your shift  note.  Outcome: Progressing  Flowsheets (Taken 9/5/2024 0645)  Outcome Evaluation: pt has not verbalized a word since the beginning of the shift. pt unable to communicate pain. pt checked, changed, and repo q2h. pt ate 75% of her dinner. pt has been stable overnight. pt on TELE monitoring.  Plan of Care Reviewed With: patient  Overall Patient Progress: no change  Goal: Patient-Specific Goal (Individualized)  Description: You can add care plan individualizations to a care plan. Examples of Individualization might be:  \"Parent requests to be called daily at 9am for status\", \"I have a hard time hearing out of my right ear\", or \"Do not touch me to wake me up as it startles  me\".  Outcome: Progressing  Goal: Absence of Hospital-Acquired Illness or Injury  Outcome: Progressing  Intervention: Identify and Manage Fall Risk  Recent Flowsheet Documentation  Taken 9/4/2024 2310 by Ora Moss RN  Safety Promotion/Fall Prevention:   activity supervised   assistive device/personal items within reach   clutter free environment maintained   increased rounding and observation   room near nurse's station   room organization consistent   safety round/check completed   lighting adjusted  Intervention: Prevent Skin Injury  Recent Flowsheet Documentation  Taken " 9/4/2024 2310 by Ora Moss RN  Body Position:   turned   right   left  Goal: Optimal Comfort and Wellbeing  Outcome: Progressing  Goal: Readiness for Transition of Care  Outcome: Progressing     Problem: Risk for Delirium  Goal: Optimal Coping  Outcome: Progressing  Goal: Improved Behavioral Control  Outcome: Progressing  Intervention: Minimize Safety Risk  Recent Flowsheet Documentation  Taken 9/4/2024 2310 by Ora Moss RN  Enhanced Safety Measures:   pain management   room near unit station  Goal: Improved Attention and Thought Clarity  Outcome: Progressing  Goal: Improved Sleep  Outcome: Progressing

## 2024-09-06 NOTE — PROGRESS NOTES
Care Management Follow Up    Length of Stay (days): 4    Expected Discharge Date: 9/9/24     Concerns to be Addressed: Discharge Planning       Patient plan of care discussed at interdisciplinary rounds: Yes    Anticipated Discharge Disposition: Assisted Living     [facility]  [address] [place] [address]  P: [phone]    Anticipated Discharge Services: Hospice, ADL/IADL Assistance    [facility]  [address] [address]  P: [phone]  F: [phone]    Anticipated Discharge DME: Wheelchair, Bedside Table, Commode, Bariatric Bed (Hospice to provide)    Patient/family educated on Medicare website which has current facility and service quality ratings: N/A    Education Provided on the Discharge Plan: Yes    Patient/Family in Agreement with the Plan: Yes    Referrals Placed by CM/SW: [facility], Hospice      Private pay costs discussed: transportation costs    Discussed  Partnership in Safe Discharge Planning  document with patient/family: No     Handoff Completed: No, handoff not indicated or clinically appropriate    Additional Information:    Writer received call from staff with [facility] stating that she would not be able to complete the assessment with patient today. Therefore, patient will not be able to discharge until Tuesday at the earliest, as long as patient is accepted by facility.    Next Steps:      Schedule Stretcher Transportation  Complete PCS Form  IMM completed  5 days of comfort medications sent with  Orders faxed to Hospice and [facility]  Discharge note written   Hand Off completed    SAMANTHA Dyson, NAKITA  6th Floor Medical Surgical Unit  Phone [phone]      Message me securely in [email or web address]

## 2024-09-06 NOTE — PLAN OF CARE
Goal Outcome Evaluation:    Problem: Adult Inpatient Plan of Care  Goal: Optimal Comfort and Wellbeing  Outcome: Progressing     Problem: Risk for Delirium  Goal: Optimal Coping  Outcome: Progressing       Patient resting comfortably. Patient's roommate here most of the day. Patient did have one episode of emesis shortly after she ate her breakfast. Patient denies nausea and appears to be comfortable(she is non-verbal.)

## 2024-09-06 NOTE — PROGRESS NOTES
[facility]    Medicine Progress Note - Hospitalist Service, GOLD TEAM 17    Date of Admission:  9/2/2024    Assessment & Plan   Mariel Riberio is a 79F w/ h/o recurrent falls, CABG x 3V in 2018 (LIMA-> LAD, SVG->OM2 and RPDA), HFpEF (EF 20-25%), COPD, migraines, morbid obesity, CKD3, T2DM admitted on 9/2/2024 with encephalopathy and found to have COVID-19.    #  MAJOR THINKS AND CHANGES TODAY  ---   Pt is currently on Hospice and comfort care measures only  ---   Awaiting for placeement     #  Hypoxia due to COVID-19 viral infection     Symptom Onset Potentially 8/31/24   Date of 1st Positive Test 9/2/24   Vaccination Status Fully Vaccinated         ---   COVID-19 special precautions, continuous pulse-ox  ---   Oxygen: continue current support with nasal cannula at 1 L/min; titrate to keep SpO2 between 90-96%  ---   Labs: Labs notable for elevated BNP, elevated CRP, troponin, elevated serum creatinine    ---   Imaging:  CXR with no acute airspace disease but revealed diffuse pulm edema vs  atelectasis not significantly changed since 7/16/24.  ---   Breathing treatments:  No inhalers needed; avoid nebulizers in favor of MDIs   ---   IV fluids: not indicated at this time, but low threshold for poor po intake  ---   Antibiotics: not indicated   ---   COVID-Focused Medications: Continue remdesivir x 5 days or until hospital discharge, started on 9/2.  Discontinue Decadron 6 mg IV daily since patient is now off of O2 supplement  ---   There is no evidence of COVID-19 pneumonia  - DVT Prophylaxis:   At high risk of thrombotic complications due to COVID-19.  Continue sq heparin      #  Acute toxic encephalopathy  ---   Has known history of encephalopathy in the setting of infections.    ---   Recent h/o acute cystitis w/ group B strep and Pseudomonas for which she was hospitalized 7/16 - 7/29/24.  D/c'd to TCU where she stayed for a month  ---   Has baseline aphasia, worse  since discharge from TCU 3 days prior to this admit  ---   High risk for stroke.  CODE STROKE was activated in the ED and was seen by Neurology consult service.    ---   CT head w/o contrast in the ED without acute findings. Pt was not cooperative and they had to tape her to the table to obtain CT hade w/o contrast  ---   Has contrast allergy thus CTA was not done   ---   MRI/MAR brain and carotids ordered but pt refused to undergo imaging studies.  She also has been refusing to take her oral meds  ---   She will require intubation in order to undergo MRI.  Neurology consult service no longer recommending MRI  ---   Pt's symptoms are not consistent with a large thrombus.  ---   Per discussion with neurology pt is not a candidate for tPA  ---   PT/OT/SLP if pt is able to participate  ---   Continue PTA ASA and Atorvastatin   ---   Pt refused to participate in PT or SLP.  Currently on FLD per SLP rec.  Pt has been refusing to take anything by mouth.     #  Chronic HFrEF w/ acute exacerbation   ---   BNP elevated to 7,631 and CXR w/ pulm edema   ---   HF exacerbation may be contributing to pt's weakness  ---   No increased dyspnea reported because pt has been bed bound  ---   She is not currently on SGLT2 given her history of UTIs.   ---   TTE 9/3 revealed severely reduced EF 15-20%, was EF 20-25% in 7/2024  ---    PTA bumex 4mg daily  ---   Continue PTA Bumex 4 mg bid  ---   Discontinue Bumex 4 mg iv bid  ---   Continue PTA Metoprolol succinate 25mg daily  ---   Continue PTA losartan 25mg daily  ---   Patient is now on comfort/hospice care measures only     #  CAD s/p CABG X 3V  #  Troponinemia  ---   CAD s/p PCI x 2 to LAD and CABG X 3V in 2018.   ---   No cardiac symptoms, CP free  ---   EKG on admission is non-ischemic.   ---   Trop elevated to 79 ---> 116 ---> 107 in the setting of above  ---   Troponinemia is due to demand-supply mismatch  ---   Reintroduce PTA Aspirin  ---   Continue PTA Lipitor 40mg daily  ---    TTE 9/3 revealed severely reduced EF 15-20%, no WMA  ---   Patient is currently on hospice/comfort care measures only     #  T2DM  ---   Fasting glucose was 161 this am  ---   Decadron discontinued today  ---   Continue PTA lantus 5 units daily  ---   Continue MSSI  ---   Hypoglycemic protocol     #  Hypothyroidism  ---   Continue PTA levothyroxine 150mcg    #  CKD3  ---   Baseline Cr 1.3 - 1.6  ---   Admit Cr was 1.31 ---> 1.27 ---> 1.29 ---> 1.19  ---   Creatinine is at baseline  ---   Avoid nephrotoxins than Bumex and losartan         #  Functional urinary incontinence  ---   Purewick placed    #  Right buttock and posterior thigh wound  ---   Pressure Injury Stage: 2, present on admission   ---   Appreciate WOCN rec  ---   Daily:  OK to leave open to air. If posterior right thigh wound drains, apply mepilex and change every 3 days.  ---   Continue preventative sacral mepilex.            Diet:  FLD  DVT Prophylaxis:  Subcutaneous heparin  Gutierrez Catheter: Not present  Lines: None     Cardiac Monitoring: None  Code Status: No CPR- Do NOT Intubate    Diet: Room Service  Advance Diet as Tolerated: Regular Diet Adult    Disposition Plan   Medically Ready for Discharge:  Right now.  Awaiting for placement          Ricarda Gastelum MD  Hospitalist Service, GOLD TEAM [address] [place] [address]  Securely message with [employer] (more info)  Text page via [facility] Paging/Directory   See signed in provider for up to date coverage information  ______________________________________________________________________    Interval History   On RA  On hospice/comfort care measures only  Awaiting for placement    Physical Exam   Vital Signs: Temp: 98.6  F (37  C) Temp src: Oral BP: (!) (P) 146/81 Pulse: (P) 100   Resp: (P) 18 SpO2: (P) 95 % O2 Device: (P) None (Room air)    Weight: 282 lbs 0 oz  General:  awake, alert, does not follow command appropriately, NAD.  HEENT:  NC/AT, neck supple  CVS:  NL s 1 and  s2, no m/r/g.  Lungs:  CTA B/L but decreased anteriorly   Abd:  Soft, + bs, NT  Ext:  No c/c.  Lymph:  No edema.  Neuro:  Nonfocal.  Musculoskeletal: No calf tenderness to palpation.    Skin:  No rash.           Data         Imaging results reviewed over the past 24 hrs:   No results found for this or any previous visit (from the past 24 hour(s)).

## 2024-09-06 NOTE — PLAN OF CARE
Problem: Adult Inpatient Plan of Care  Goal: Absence of Hospital-Acquired Illness or Injury  Intervention: Identify and Manage Fall Risk  Recent Flowsheet Documentation  Taken 9/6/2024 0212 by Buddy Reyes RN  Safety Promotion/Fall Prevention:   activity supervised   assistive device/personal items within reach   clutter free environment maintained   increased rounding and observation   room near nurse's station   room organization consistent   safety round/check completed   lighting adjusted  Intervention: Prevent Skin Injury  Recent Flowsheet Documentation  Taken 9/6/2024 0212 by Buddy Reyes RN  Body Position:   turned   right   left  Skin Protection: adhesive use limited  Device Skin Pressure Protection: absorbent pad utilized/changed  Intervention: Prevent Infection  Recent Flowsheet Documentation  Taken 9/6/2024 0212 by Buddy Reyes RN  Infection Prevention: rest/sleep promoted     Problem: Risk for Delirium  Goal: Optimal Coping  Intervention: Optimize Psychosocial Adjustment to Delirium  Recent Flowsheet Documentation  Taken 9/6/2024 0212 by Buddy Reyes RN  Supportive Measures: active listening utilized  Goal: Improved Behavioral Control  Intervention: Minimize Safety Risk  Recent Flowsheet Documentation  Taken 9/6/2024 0212 by Buddy Reyes RN  Communication Enhancement Strategies: call light answered in person  Enhanced Safety Measures:   pain management   room near unit station  Goal: Improved Attention and Thought Clarity  Intervention: Maximize Cognitive Function  Recent Flowsheet Documentation  Taken 9/6/2024 0212 by Buddy Reyes RN  Sensory Stimulation Regulation: music on  Reorientation Measures: clock in view   Goal Outcome Evaluation:  Alert / some times opens eyes to speech, but nonverbal. Not out of bed. Repositioned every 2 hours. On bed alarm. Purewick on for urine elimination.   Unable to access pain as pt not responding to questions.Continue plan of  care

## 2024-09-06 NOTE — PLAN OF CARE
"  Problem: Adult Inpatient Plan of Care  Goal: Plan of Care Review  Description: The Plan of Care Review/Shift note should be completed every shift.  The Outcome Evaluation is a brief statement about your assessment that the patient is improving, declining, or no change.  This information will be displayed automatically on your shift  note.  Outcome: Progressing  Flowsheets (Taken 9/5/2024 9142)  Outcome Evaluation: Pt is non verbal plan is for her to sign on with hospice monday. pt repositioned Q2H wounds IMANI no drainage ate 50% of her dinner did not take her bedtime medication. continue POC continues to use purewick  Plan of Care Reviewed With: patient  Overall Patient Progress: improving  Goal: Patient-Specific Goal (Individualized)  Description: You can add care plan individualizations to a care plan. Examples of Individualization might be:  \"Parent requests to be called daily at 9am for status\", \"I have a hard time hearing out of my right ear\", or \"Do not touch me to wake me up as it startles  me\".  Outcome: Progressing  Goal: Absence of Hospital-Acquired Illness or Injury  Outcome: Progressing  Intervention: Identify and Manage Fall Risk  Recent Flowsheet Documentation  Taken 9/5/2024 1630 by Elena Kohli, RN  Safety Promotion/Fall Prevention:   activity supervised   assistive device/personal items within reach   clutter free environment maintained   increased rounding and observation   room near nurse's station   room organization consistent   safety round/check completed   lighting adjusted  Goal: Optimal Comfort and Wellbeing  Outcome: Progressing  Goal: Readiness for Transition of Care  Outcome: Progressing   Goal Outcome Evaluation:      Plan of Care Reviewed With: patient    Overall Patient Progress: improvingOverall Patient Progress: improving    Outcome Evaluation: Pt is non verbal plan is for her to sign on with hospice monday. pt repositioned Q2H wounds IMANI no drainage ate 50% of her dinner did not " take her bedtime medication. continue POC continues to use [place]ins

## 2024-09-07 NOTE — PROGRESS NOTES
[facility]    Medicine Progress Note - Hospitalist Service, GOLD TEAM 17    Date of Admission:  9/2/2024    Assessment & Plan   Mariel Ribeiro is a 79F w/ h/o recurrent falls, CABG x 3V in 2018 (LIMA-> LAD, SVG->OM2 and RPDA), HFpEF (EF 20-25%), COPD, migraines, morbid obesity, CKD3, T2DM admitted on 9/2/2024 with encephalopathy and found to have COVID-19.    #  MAJOR THINKS AND CHANGES TODAY  ---   Uneventful night  ---   Pt is currently on Hospice and comfort care measures only  ---   Awaiting for placeement     #  Hypoxia due to COVID-19 viral infection     Symptom Onset Potentially 8/31/24   Date of 1st Positive Test 9/2/24   Vaccination Status Fully Vaccinated         ---   COVID-19 special precautions, continuous pulse-ox  ---   Oxygen: continue current support with nasal cannula at 1 L/min; titrate to keep SpO2 between 90-96%  ---   Labs: Labs notable for elevated BNP, elevated CRP, troponin, elevated serum creatinine    ---   Imaging:  CXR with no acute airspace disease but revealed diffuse pulm edema vs  atelectasis not significantly changed since 7/16/24.  ---   Breathing treatments:  No inhalers needed; avoid nebulizers in favor of MDIs   ---   IV fluids: not indicated at this time, but low threshold for poor po intake  ---   Antibiotics: not indicated   ---   COVID-Focused Medications: Continue remdesivir x 5 days or until hospital discharge, started on 9/2.  Discontinue Decadron 6 mg IV daily since patient is now off of O2 supplement  ---   There is no evidence of COVID-19 pneumonia  - DVT Prophylaxis:   At high risk of thrombotic complications due to COVID-19.  Continue sq heparin      #  Acute toxic encephalopathy  ---   Has known history of encephalopathy in the setting of infections.    ---   Recent h/o acute cystitis w/ group B strep and Pseudomonas for which she was hospitalized 7/16 - 7/29/24.  D/c'd to TCU where she stayed for a month  ---   Has  baseline aphasia, worse since discharge from TCU 3 days prior to this admit  ---   High risk for stroke.  CODE STROKE was activated in the ED and was seen by Neurology consult service.    ---   CT head w/o contrast in the ED without acute findings. Pt was not cooperative and they had to tape her to the table to obtain CT hade w/o contrast  ---   Has contrast allergy thus CTA was not done   ---   MRI/MAR brain and carotids ordered but pt refused to undergo imaging studies.  She also has been refusing to take her oral meds  ---   She will require intubation in order to undergo MRI.  Neurology consult service no longer recommending MRI  ---   Pt's symptoms are not consistent with a large thrombus.  ---   Per discussion with neurology pt is not a candidate for tPA  ---   PT/OT/SLP if pt is able to participate  ---   Continue PTA ASA and Atorvastatin   ---   Pt refused to participate in PT or SLP.  Currently on FLD per SLP rec.  Pt has been refusing to take anything by mouth.     #  Chronic HFrEF w/ acute exacerbation   ---   BNP elevated to 7,631 and CXR w/ pulm edema   ---   HF exacerbation may be contributing to pt's weakness  ---   No increased dyspnea reported because pt has been bed bound  ---   She is not currently on SGLT2 given her history of UTIs.   ---   TTE 9/3 revealed severely reduced EF 15-20%, was EF 20-25% in 7/2024  ---    PTA bumex 4mg daily  ---   Continue PTA Bumex 4 mg bid  ---   Discontinue Bumex 4 mg iv bid  ---   Continue PTA Metoprolol succinate 25mg daily  ---   Continue PTA losartan 25mg daily  ---   Patient is now on comfort/hospice care measures only     #  CAD s/p CABG X 3V  #  Troponinemia  ---   CAD s/p PCI x 2 to LAD and CABG X 3V in 2018.   ---   No cardiac symptoms, CP free  ---   EKG on admission is non-ischemic.   ---   Trop elevated to 79 ---> 116 ---> 107 in the setting of above  ---   Troponinemia is due to demand-supply mismatch  ---   Reintroduce PTA Aspirin  ---   Continue PTA  Lipitor 40mg daily  ---   TTE 9/3 revealed severely reduced EF 15-20%, no WMA  ---   Patient is currently on hospice/comfort care measures only     #  T2DM  ---   Fasting glucose was 161 this am  ---   Decadron discontinued today  ---   Continue PTA lantus 5 units daily  ---   Continue MSSI  ---   Hypoglycemic protocol     #  Hypothyroidism  ---   Continue PTA levothyroxine 150mcg    #  CKD3  ---   Baseline Cr 1.3 - 1.6  ---   Admit Cr was 1.31 ---> 1.27 ---> 1.29 ---> 1.19  ---   Creatinine is at baseline  ---   Avoid nephrotoxins than Bumex and losartan         #  Functional urinary incontinence  ---   Purewick placed    #  Right buttock and posterior thigh wound  ---   Pressure Injury Stage: 2, present on admission   ---   Appreciate WOCN rec  ---   Daily:  OK to leave open to air. If posterior right thigh wound drains, apply mepilex and change every 3 days.  ---   Continue preventative sacral mepilex.            Diet:  FLD  DVT Prophylaxis:  Subcutaneous heparin  Gutierrez Catheter: Not present  Lines: None     Cardiac Monitoring: None  Code Status: No CPR- Do NOT Intubate    Diet: Room Service  Advance Diet as Tolerated: Regular Diet Adult    Disposition Plan   Medically Ready for Discharge:  Right now.  Awaiting for placement          Ricarda Gastelum MD  Hospitalist Service, GOLD TEAM 17  [facility]  Securely message with [employer] (more info)  Text page via [facility] Paging/Directory   See signed in provider for up to date coverage information  ______________________________________________________________________    Interval History   On RA  On hospice/comfort care measures only  Awaiting for placement    Physical Exam   Vital Signs: Temp: 98.1  F (36.7  C) Temp src: Oral BP: (!) 155/94 Pulse: 86   Resp: 20 SpO2: 97 % O2 Device: None (Room air)    Weight: 282 lbs 0 oz  General:  awake, alert, does not follow command appropriately, NAD.  HEENT:  NC/AT, neck supple  CVS:  NL s  1 and s2, no m/r/g.  Lungs:  CTA B/L but decreased anteriorly   Abd:  Soft, + bs, NT  Ext:  No c/c.  Lymph:  No edema.  Neuro:  Nonfocal.  Musculoskeletal: No calf tenderness to palpation.    Skin:  No rash.           Data         Imaging results reviewed over the past 24 hrs:   No results found for this or any previous visit (from the past 24 hour(s)).

## 2024-09-07 NOTE — PLAN OF CARE
Goal Outcome Evaluation:      Plan of Care Reviewed With: patient    Overall Patient Progress: improvingOverall Patient Progress: improving    Outcome Evaluation: Patient alert to self. Staff anticipate needs. Patient verbally communicating more and following commands, slowing assisting in bed mobility. Patient denies pain, no nonverbal indicators of pain present. Expiratory wheezes in lungs, infrequent congested nonproductive cough, sats >90 % on RA. VSS. Repositioning Q2hrs. Incontinent of bowel and bladder, small BM this shift, voiding without difficulties, purewick in place. Scattered bruising to extremities, scab to left buttock. Right PIV patent and SL.    Continue with plan of care.    Temp: 97.9  F (36.6  C) Temp src: Oral BP: 100/67 Pulse: 87   Resp: 16 SpO2: 96 % O2 Device: None (Room air)

## 2024-09-08 NOTE — PROGRESS NOTES
[facility]    Medicine Progress Note - Hospitalist Service, GOLD TEAM 17    Date of Admission:  9/2/2024    Assessment & Plan   Mariel Ribeiro is a 79F w/ h/o recurrent falls, CABG x 3V in 2018 (LIMA-> LAD, SVG->OM2 and RPDA), HFpEF (EF 20-25%), COPD, migraines, morbid obesity, CKD3, T2DM admitted on 9/2/2024 with encephalopathy and found to have COVID-19.    #  MAJOR THINKS AND CHANGES TODAY  ---   Uneventful night  ---   Currently on Hospice and comfort care measures only  ---   Awaiting for placeement     #  Hypoxia due to COVID-19 viral infection     Symptom Onset Potentially 8/31/24   Date of 1st Positive Test 9/2/24   Vaccination Status Fully Vaccinated         ---   COVID-19 special precautions, continuous pulse-ox  ---   Oxygen: continue current support with nasal cannula at 1 L/min; titrate to keep SpO2 between 90-96%  ---   Labs: Labs notable for elevated BNP, elevated CRP, troponin, elevated serum creatinine    ---   Imaging:  CXR with no acute airspace disease but revealed diffuse pulm edema vs  atelectasis not significantly changed since 7/16/24.  ---   Breathing treatments:  No inhalers needed; avoid nebulizers in favor of MDIs   ---   IV fluids: not indicated at this time, but low threshold for poor po intake  ---   Antibiotics: not indicated   ---   COVID-Focused Medications: Continue remdesivir x 5 days or until hospital discharge, started on 9/2.  Discontinue Decadron 6 mg IV daily since patient is now off of O2 supplement  ---   There is no evidence of COVID-19 pneumonia  - DVT Prophylaxis:   At high risk of thrombotic complications due to COVID-19.  Continue sq heparin      #  Acute toxic encephalopathy  ---   Has known history of encephalopathy in the setting of infections.    ---   Recent h/o acute cystitis w/ group B strep and Pseudomonas for which she was hospitalized 7/16 - 7/29/24.  D/c'd to TCU where she stayed for a month  ---   Has baseline  aphasia, worse since discharge from TCU 3 days prior to this admit  ---   High risk for stroke.  CODE STROKE was activated in the ED and was seen by Neurology consult service.    ---   CT head w/o contrast in the ED without acute findings. Pt was not cooperative and they had to tape her to the table to obtain CT hade w/o contrast  ---   Has contrast allergy thus CTA was not done   ---   MRI/MAR brain and carotids ordered but pt refused to undergo imaging studies.  She also has been refusing to take her oral meds  ---   She will require intubation in order to undergo MRI.  Neurology consult service no longer recommending MRI  ---   Pt's symptoms are not consistent with a large thrombus.  ---   Per discussion with neurology pt is not a candidate for tPA  ---   PT/OT/SLP if pt is able to participate  ---   Continue PTA ASA and Atorvastatin   ---   Pt refused to participate in PT or SLP.  Currently on FLD per SLP rec.  Pt has been refusing to take anything by mouth.     #  Chronic HFrEF w/ acute exacerbation   ---   BNP elevated to 7,631 and CXR w/ pulm edema   ---   HF exacerbation may be contributing to pt's weakness  ---   No increased dyspnea reported because pt has been bed bound  ---   She is not currently on SGLT2 given her history of UTIs.   ---   TTE 9/3 revealed severely reduced EF 15-20%, was EF 20-25% in 7/2024  ---    PTA bumex 4mg daily  ---   Continue PTA Bumex 4 mg bid  ---   Discontinue Bumex 4 mg iv bid  ---   Continue PTA Metoprolol succinate 25mg daily  ---   Continue PTA losartan 25mg daily  ---   Patient is now on comfort/hospice care measures only     #  CAD s/p CABG X 3V  #  Troponinemia  ---   CAD s/p PCI x 2 to LAD and CABG X 3V in 2018.   ---   No cardiac symptoms, CP free  ---   EKG on admission is non-ischemic.   ---   Trop elevated to 79 ---> 116 ---> 107 in the setting of above  ---   Troponinemia is due to demand-supply mismatch  ---   Reintroduce PTA Aspirin  ---   Continue PTA Lipitor  40mg daily  ---   TTE 9/3 revealed severely reduced EF 15-20%, no WMA  ---   Patient is currently on hospice/comfort care measures only     #  T2DM  ---   Fasting glucose was 161 this am  ---   Decadron discontinued today  ---   Continue PTA lantus 5 units daily  ---   Continue MSSI  ---   Hypoglycemic protocol     #  Hypothyroidism  ---   Continue PTA levothyroxine 150mcg    #  CKD3  ---   Baseline Cr 1.3 - 1.6  ---   Admit Cr was 1.31 ---> 1.27 ---> 1.29 ---> 1.19  ---   Creatinine is at baseline  ---   Avoid nephrotoxins than Bumex and losartan         #  Functional urinary incontinence  ---   Purewick placed    #  Right buttock and posterior thigh wound  ---   Pressure Injury Stage: 2, present on admission   ---   Appreciate WOCN rec  ---   Daily:  OK to leave open to air. If posterior right thigh wound drains, apply mepilex and change every 3 days.  ---   Continue preventative sacral mepilex.            Diet:  FLD  DVT Prophylaxis:  Subcutaneous heparin  Gutierrez Catheter: Not present  Lines: None     Cardiac Monitoring: None  Code Status: No CPR- Do NOT Intubate    Diet: Room Service  Advance Diet as Tolerated: Regular Diet Adult    Disposition Plan   Medically Ready for Discharge:  Right now.  Awaiting for placement          Ricarda Gastelum MD  Hospitalist Service, GOLD TEAM [address] [place] [address]  Securely message with [employer] (more info)  Text page via [facility] Paging/Directory   See signed in provider for up to date coverage information  ______________________________________________________________________    Interval History   On RA  On hospice/comfort care measures only  Awaiting for placement    Physical Exam   Vital Signs: Temp: 99.7  F (37.6  C) Temp src: Axillary BP: (!) 151/78 Pulse: 99   Resp: 18 SpO2: 96 % O2 Device: None (Room air)    Weight: 282 lbs 0 oz  General:  awake, alert, does not follow command appropriately, NAD.  HEENT:  NC/AT, neck supple  CVS:  NL s 1  and s2, no m/r/g.  Lungs:  CTA B/L but decreased anteriorly   Abd:  Soft, + bs, NT  Ext:  No c/c.  Lymph:  No edema.  Neuro:  Nonfocal.  Musculoskeletal: No calf tenderness to palpation.    Skin:  No rash.           Data         Imaging results reviewed over the past 24 hrs:   No results found for this or any previous visit (from the past 24 hour(s)).

## 2024-09-08 NOTE — PLAN OF CARE
[id number]    Problem: Adult Inpatient Plan of Care  Goal: Plan of Care Review  Description: The Plan of Care Review/Shift note should be completed every shift.  The Outcome Evaluation is a brief statement about your assessment that the patient is improving, declining, or no change.  This information will be displayed automatically on your shift  note.  Flowsheets (Taken 9/8/2024 0108)  Plan of Care Reviewed With: patient  Overall Patient Progress: no change  Goal: Optimal Comfort and Wellbeing  Outcome: Progressing     Problem: Risk for Delirium  Goal: Improved Behavioral Control  Intervention: Minimize Safety Risk  Recent Flowsheet Documentation  Taken 9/8/2024 0000 by Theresa Landaverde RN  Communication Enhancement Strategies:   call light answered in person   family/caregiver assisted with communication   nonverbal strategies used  Goal: Improved Attention and Thought Clarity  Intervention: Maximize Cognitive Function  Recent Flowsheet Documentation  Taken 9/8/2024 0000 by Theresa Landaverde RN  Reorientation Measures:   clock in view   reorientation provided  Goal: Improved Sleep  Outcome: Progressing         Goal Outcome Evaluation:    Plan of Care Reviewed With: patient  Overall Patient Progress: no change

## 2024-09-09 NOTE — PLAN OF CARE
Problem: Adult Inpatient Plan of Care  Goal: Absence of Hospital-Acquired Illness or Injury  Outcome: Progressing  Intervention: Identify and Manage Fall Risk  Recent Flowsheet Documentation  Taken 9/8/2024 0900 by Sherrie Lorenzo RN  Safety Promotion/Fall Prevention:   room organization consistent   room near nurse's station   lighting adjusted  Intervention: Prevent Skin Injury  Recent Flowsheet Documentation  Taken 9/8/2024 0900 by Sherrie Lorenzo RN  Body Position:   turned   right   left   supine  Device Skin Pressure Protection:   absorbent pad utilized/changed   pressure points protected   tubing/devices free from skin contact  Intervention: Prevent Infection  Recent Flowsheet Documentation  Taken 9/8/2024 0900 by Sherrie Lorenzo RN  Infection Prevention:   single patient room provided   rest/sleep promoted   cohorting utilized     Problem: Adult Inpatient Plan of Care  Goal: Absence of Hospital-Acquired Illness or Injury  Intervention: Prevent Skin Injury  Recent Flowsheet Documentation  Taken 9/8/2024 0900 by Sherrie Lorenzo RN  Body Position:   turned   right   left   supine  Device Skin Pressure Protection:   absorbent pad utilized/changed   pressure points protected   tubing/devices free from skin contact     Problem: Adult Inpatient Plan of Care  Goal: Absence of Hospital-Acquired Illness or Injury  Intervention: Prevent Infection  Recent Flowsheet Documentation  Taken 9/8/2024 0900 by Sherrie Lorenzo RN  Infection Prevention:   single patient room provided   rest/sleep promoted   cohorting utilized     Problem: Adult Inpatient Plan of Care  Goal: Optimal Comfort and Wellbeing  Outcome: Progressing

## 2024-09-09 NOTE — PLAN OF CARE
"[id number]    Problem: Adult Inpatient Plan of Care  Goal: Plan of Care Review  Description: The Plan of Care Review/Shift note should be completed every shift.  The Outcome Evaluation is a brief statement about your assessment that the patient is improving, declining, or no change.  This information will be displayed automatically on your shift  note.  Outcome: Progressing  Flowsheets (Taken 9/9/2024 0117)  Plan of Care Reviewed With: patient  Overall Patient Progress: no change  Goal: Patient-Specific Goal (Individualized)  Description: You can add care plan individualizations to a care plan. Examples of Individualization might be:  \"Parent requests to be called daily at 9am for status\", \"I have a hard time hearing out of my right ear\", or \"Do not touch me to wake me up as it startles  me\".  Outcome: Progressing  Goal: Absence of Hospital-Acquired Illness or Injury  Intervention: Identify and Manage Fall Risk  Recent Flowsheet Documentation  Taken 9/9/2024 0100 by Theresa Landaverde RN  Safety Promotion/Fall Prevention:   room organization consistent   room near nurse's station   lighting adjusted  Intervention: Prevent Skin Injury  Recent Flowsheet Documentation  Taken 9/9/2024 0100 by Theresa Landaverde RN  Body Position:   turned   right   left   supine  Device Skin Pressure Protection:   absorbent pad utilized/changed   pressure points protected   tubing/devices free from skin contact  Intervention: Prevent Infection  Recent Flowsheet Documentation  Taken 9/9/2024 0100 by Theresa Landaverde RN  Infection Prevention:   single patient room provided   rest/sleep promoted   cohorting utilized  Goal: Optimal Comfort and Wellbeing  Outcome: Progressing     Problem: Risk for Delirium  Goal: Improved Behavioral Control  Intervention: Minimize Safety Risk  Recent Flowsheet Documentation  Taken 9/9/2024 0100 by Theresa Landaverde RN  Communication Enhancement Strategies:   call light answered in person   " nonverbal strategies used   family involved in communication plan  Enhanced Safety Measures: room near unit station  Goal: Improved Attention and Thought Clarity  Intervention: Maximize Cognitive Function  Recent Flowsheet Documentation  Taken 9/9/2024 0100 by Theresa Landaverde RN  Sensory Stimulation Regulation:   music on   lighting decreased  Reorientation Measures: clock in view  Goal: Improved Sleep  Outcome: Progressing  Intervention: Promote Sleep  Recent Flowsheet Documentation  Taken 9/9/2024 0100 by Theresa Landaverde RN  Sleep/Rest Enhancement:   awakenings minimized   comfort measures   medication   noise level reduced   room darkened    Pt is Awake, alert, not in distress. On regular Diet. Takes meds with apple sauce. R PIV SL Special  precautions maintained / Continue with plan of care. Call light within reach, pt able to make needs known. Frequent check and reposition.  Call light within reach. Continue POC.    Goal Outcome Evaluation:    Plan of Care Reviewed With: patient  Overall Patient Progress: no change

## 2024-09-09 NOTE — PROGRESS NOTES
Care Management Discharge Note    Discharge Date: 9/10/24     Discharge Disposition: Assisted Living    [facility]  [address] [place] [address]  P: [phone]    Discharge Services: Assisted Living, Hospice    [facility]  [address]  [facility] [address]  P: [phone]  F: [phone]    Discharge DME: Wheelchair, Hospital Bed, Bedside Table, Shower Chair (Hospice to provide-to be delivered 9/10 between 8 am and 12 pm)    Discharge Transportation: [facility] between 11:39 am and 12:24 pm [phone]    Private pay costs discussed: transportation costs    Does the patient's insurance plan have a 3 day qualifying hospital stay waiver?  No    PAS Confirmation Code: N/A     Patient/family educated on Medicare website which has current facility and service quality ratings: N/A    Education Provided on the Discharge Plan: Yes    Persons Notified of Discharge Plans: Charge Nurse, Bedside Nurse, MD, Patient, Patient's Significant Other, [facility] Staff    Patient/Family in Agreement with the Plan: Yes     Handoff Referral Completed: Yes, [facility] [employer] Internal Hand Off Completed.     Additional Information:    Patient to discharge to [facility] on 9/10/24 via [facility] [employer] stretcher transportation between 11:39 am and 12:24 pm. Writer discussed to possible costs with patient and significant other, but PCS Form was completed, faxed to billing and in patient's chart. IMM completed, documented in Epic and original in chart. Discharge orders will be faxed to facility ([phone]) and Hospice ([phone]) at once completed. Writer requested 5 days of medications to be filled by MD and also to have her sliding scale insulin discontinued per facility.    Wendi Castaneda, SAMANTHA, MSW  6th Floor Medical Surgical Unit  Phone [phone]      Message me securely in [employer]

## 2024-09-09 NOTE — PLAN OF CARE
Goal Outcome Evaluation:      Plan of Care Reviewed With: patient    Overall Patient Progress: improvingOverall Patient Progress: improving  Pt needs 3 people for cares, pt pushes against staff trying to turn her for cares. Use lift. Pts friend present, feeds pt, pt ate 100% of her meals, DM2 sliding scale, pt ate meals late this shift. Pt will speak at times but then will just stare at staff.          Problem: Adult Inpatient Plan of Care  Goal: Plan of Care Review  Description: The Plan of Care Review/Shift note should be completed every shift.  The Outcome Evaluation is a brief statement about your assessment that the patient is improving, declining, or no change.  This information will be displayed automatically on your shift  note.  Outcome: Progressing  Flowsheets (Taken 9/9/2024 1614)  Plan of Care Reviewed With: patient  Overall Patient Progress: improving     Problem: Adult Inpatient Plan of Care  Goal: Absence of Hospital-Acquired Illness or Injury  Intervention: Prevent Skin Injury  Recent Flowsheet Documentation  Taken 9/9/2024 1430 by Lucie Rascon, RN  Body Position:   left   right   turned  Taken 9/9/2024 1200 by Lucie Rascon, RN  Body Position:   left   right   turned  Taken 9/9/2024 1030 by Lucie Rascon, RN  Body Position:   turned   right   left   supine  Taken 9/9/2024 0800 by Lucie Rascon RN  Body Position:   turned   right   left   supine     Problem: Risk for Delirium  Goal: Optimal Coping  Outcome: Progressing  Goal: Improved Behavioral Control  Outcome: Progressing  Goal: Improved Attention and Thought Clarity  Outcome: Progressing  Goal: Improved Sleep  Outcome: Progressing

## 2024-09-09 NOTE — PROGRESS NOTES
[facility]    Medicine Progress Note - Hospitalist Service, GOLD TEAM 17    Date of Admission:  9/2/2024    Assessment & Plan   Mariel Ribeiro is a 79F w/ h/o recurrent falls, CABG x 3V in 2018 (LIMA-> LAD, SVG->OM2 and RPDA), HFpEF (EF 20-25%), COPD, migraines, morbid obesity, CKD3, T2DM admitted on 9/2/2024 with encephalopathy and found to have COVID-19.    #  MAJOR THINKS AND CHANGES TODAY  ---   Uneventful night  ---   Currently on Hospice and comfort care measures only  ---   Awaiting for placeement     #  Hypoxia due to COVID-19 viral infection     Symptom Onset Potentially 8/31/24   Date of 1st Positive Test 9/2/24   Vaccination Status Fully Vaccinated         ---   COVID-19 special precautions, continuous pulse-ox  ---   Oxygen: continue current support with nasal cannula at 1 L/min; titrate to keep SpO2 between 90-96%  ---   Labs: Labs notable for elevated BNP, elevated CRP, troponin, elevated serum creatinine    ---   Imaging:  CXR with no acute airspace disease but revealed diffuse pulm edema vs  atelectasis not significantly changed since 7/16/24.  ---   Breathing treatments:  No inhalers needed; avoid nebulizers in favor of MDIs   ---   IV fluids: not indicated at this time, but low threshold for poor po intake  ---   Antibiotics: not indicated   ---   COVID-Focused Medications: Continue remdesivir x 5 days or until hospital discharge, started on 9/2.  Discontinue Decadron 6 mg IV daily since patient is now off of O2 supplement  ---   There is no evidence of COVID-19 pneumonia  - DVT Prophylaxis:   At high risk of thrombotic complications due to COVID-19.  Continue sq heparin      #  Acute toxic encephalopathy  ---   Has known history of encephalopathy in the setting of infections.    ---   Recent h/o acute cystitis w/ group B strep and Pseudomonas for which she was hospitalized 7/16 - 7/29/24.  D/c'd to [facility]CU where she stayed for a month  ---   Has baseline  aphasia, worse since discharge from TCU 3 days prior to this admit  ---   High risk for stroke.  CODE STROKE was activated in the ED and was seen by Neurology consult service.    ---   CT head w/o contrast in the ED without acute findings. Pt was not cooperative and they had to tape her to the table to obtain CT hade w/o contrast  ---   Has contrast allergy thus CTA was not done   ---   MRI/MAR brain and carotids ordered but pt refused to undergo imaging studies.  She also has been refusing to take her oral meds  ---   She will require intubation in order to undergo MRI.  Neurology consult service no longer recommending MRI  ---   Pt's symptoms are not consistent with a large thrombus.  ---   Per discussion with neurology pt is not a candidate for tPA  ---   PT/OT/SLP if pt is able to participate  ---   Continue PTA ASA and Atorvastatin   ---   Pt refused to participate in PT or SLP.  Currently on FLD per SLP rec.  Pt has been refusing to take anything by mouth.     #  Chronic HFrEF w/ acute exacerbation   ---   BNP elevated to 7,631 and CXR w/ pulm edema   ---   HF exacerbation may be contributing to pt's weakness  ---   No increased dyspnea reported because pt has been bed bound  ---   She is not currently on SGLT2 given her history of UTIs.   ---   TTE 9/3 revealed severely reduced EF 15-20%, was EF 20-25% in 7/2024  ---    PTA bumex 4mg daily  ---   Continue PTA Bumex 4 mg bid  ---   Discontinue Bumex 4 mg iv bid  ---   Continue PTA Metoprolol succinate 25mg daily  ---   Continue PTA losartan 25mg daily  ---   Patient is now on comfort/hospice care measures only     #  CAD s/p CABG X 3V  #  Troponinemia  ---   CAD s/p PCI x 2 to LAD and CABG X 3V in 2018.   ---   No cardiac symptoms, CP free  ---   EKG on admission is non-ischemic.   ---   Trop elevated to 79 ---> 116 ---> 107 in the setting of above  ---   Troponinemia is due to demand-supply mismatch  ---   Reintroduce PTA Aspirin  ---   Continue PTA Lipitor  40mg daily  ---   TTE 9/3 revealed severely reduced EF 15-20%, no WMA  ---   Patient is currently on hospice/comfort care measures only     #  T2DM  ---   Fasting glucose was 161 this am  ---   Decadron discontinued today  ---   Continue PTA lantus 5 units daily  ---   Continue MSSI  ---   Hypoglycemic protocol     #  Hypothyroidism  ---   Continue PTA levothyroxine 150mcg    #  CKD3  ---   Baseline Cr 1.3 - 1.6  ---   Admit Cr was 1.31 ---> 1.27 ---> 1.29 ---> 1.19  ---   Creatinine is at baseline  ---   Avoid nephrotoxins than Bumex and losartan         #  Functional urinary incontinence  ---   Purewick placed    #  Right buttock and posterior thigh wound  ---   Pressure Injury Stage: 2, present on admission   ---   Appreciate WOCN rec  ---   Daily:  OK to leave open to air. If posterior right thigh wound drains, apply mepilex and change every 3 days.  ---   Continue preventative sacral mepilex.            Diet:  FLD  DVT Prophylaxis:  Subcutaneous heparin  Gutierrez Catheter: Not present  Lines: None     Cardiac Monitoring: None  Code Status: No CPR- Do NOT Intubate    Diet: Room Service  Advance Diet as Tolerated: Regular Diet Adult    Disposition Plan   Medically Ready for Discharge:  Right now.  Awaiting for placement          Ricarda Gastelum MD  Hospitalist Service, GOLD TEAM [address] [place] [address]  Securely message with [employer] (more info)  Text page via [facility] Paging/Directory   See signed in provider for up to date coverage information  ______________________________________________________________________    Interval History   On RA  On hospice/comfort care measures only  Awaiting for placement    Physical Exam   Vital Signs: Temp: 98.4  F (36.9  C) Temp src: Oral BP: (!) 153/133 Pulse: 89   Resp: 18 SpO2: 97 % O2 Device: None (Room air)    Weight: 282 lbs 0 oz  General:  awake, alert, does not follow command appropriately, NAD.  HEENT:  NC/AT, neck supple  CVS:  NL s 1 and  s2, no m/r/g.  Lungs:  CTA B/L but decreased anteriorly   Abd:  Soft, + bs, NT  Ext:  No c/c.  Lymph:  No edema.  Neuro:  Nonfocal.  Musculoskeletal: No calf tenderness to palpation.    Skin:  No rash.           Data         Imaging results reviewed over the past 24 hrs:   No results found for this or any previous visit (from the past 24 hour(s)).

## 2024-09-10 NOTE — PLAN OF CARE
"  Problem: Adult Inpatient Plan of Care  Goal: Plan of Care Review  Description: The Plan of Care Review/Shift note should be completed every shift.  The Outcome Evaluation is a brief statement about your assessment that the patient is improving, declining, or no change.  This information will be displayed automatically on your shift  note.  Outcome: Progressing  Flowsheets (Taken 9/10/2024 0015)  Outcome Evaluation: No change patient needed to encouragement to take mediaiton. patient has crackles in lung bases. pm this shift and purewick in use.  Plan of Care Reviewed With: patient  Overall Patient Progress: no change  Goal: Patient-Specific Goal (Individualized)  Description: You can add care plan individualizations to a care plan. Examples of Individualization might be:  \"Parent requests to be called daily at 9am for status\", \"I have a hard time hearing out of my right ear\", or \"Do not touch me to wake me up as it startles  me\".  Outcome: Progressing  Goal: Absence of Hospital-Acquired Illness or Injury  Outcome: Progressing  Intervention: Identify and Manage Fall Risk  Recent Flowsheet Documentation  Taken 9/9/2024 1700 by Elena Kohli, RN  Safety Promotion/Fall Prevention:   room organization consistent   room near nurse's station   lighting adjusted  Intervention: Prevent Infection  Recent Flowsheet Documentation  Taken 9/9/2024 1700 by Elena Kohli, RN  Infection Prevention:   single patient room provided   rest/sleep promoted   cohorting utilized  Goal: Optimal Comfort and Wellbeing  Outcome: Progressing  Goal: Readiness for Transition of Care  Outcome: Progressing   Goal Outcome Evaluation:      Plan of Care Reviewed With: patient    Overall Patient Progress: no changeOverall Patient Progress: no change    Outcome Evaluation: No change patient needed to encouragement to take mediaiton. patient has crackles in lung bases. pm this shift and purewick in use.      "

## 2024-09-10 NOTE — PROGRESS NOTES
Patient discharged to [facility] living for hospice. Patient paperwork given to EMS. Patient had no belongings. Report given to Nata VERA. Confirmed delivery of bed to facility and family present with Richard; caregiver at facility. Patient in no distress.

## 2024-09-10 NOTE — DISCHARGE SUMMARY
[facility]  Hospitalist Discharge Summary      Date of Admission:  9/2/2024  Date of Discharge:  9/10/2024  Discharging Provider: Ramon Norris MD  Discharge Service: Hospitalist Service, GOLD TEAM 17    Discharge Diagnoses   1.  Hypoxia due to COVID-19 viral infection, Acute on Chronic CHF  2.   Acute toxic encephalopathy  3.  Baseline aphasia with progressive cognitive and functional decline   4. Severe ICM, Chronic HFrEF w/ acute exacerbation       EF 15-20%  5.  CAD s/p CABG X 3V  6.  T2DM  7.  Hypothyroidism  8.  CKD stage 3b  9.  Functional urinary incontinence  10.  Right buttock and posterior thigh wound          Follow-ups Needed After Discharge   Follow-up Appointments     Adult [facility] Follow-up and recommended labs and tests      Follow up with primary care provider, Malika Wolf, within 7 days for   hospital follow- up.  No follow up labs or test are needed.      Appointments on [place] and/or [facility] (with [facility] or [facility]   provider or service). Call [phone] if you haven't heard regarding   these appointments within 7 days of discharge.          Discharge Disposition   Discharged to assisted living  Condition at discharge: Terminal    Hospital Course   Mariel Ribeiro is a 79F w/ h/o recurrent falls, CABG x 3V in 2018 (LIMA-> LAD, SVG->OM2 and RPDA), HFpEF (EF 20-25%), COPD, migraines, morbid obesity, CKD3, T2DM admitted on 9/2/2024 with encephalopathy and found to have COVID-19.          #  Hypoxia due to COVID-19 viral infection     Symptom Onset Potentially 8/31/24   Date of 1st Positive Test 9/2/24   Vaccination Status Fully Vaccinated         Patient presented with hypoxemia and tested COVID-positive, and was treated with remdesivir, DVT prophylaxis with subcu heparin and shorted course of Decadron.  CXR showed no acute airspace disease but did reveal diffuse pulmonary edema versus atelectasis not significantly changed  compared to 7/16/2024.  Upon daily reassessment it was felt by Dr. Gastelum that the patient did not have any evidence of active COVID-19 pneumonia, although due to her aphasia and cognitive, functional decline of is difficult to discern.  Decadron was discontinued once patient was off oxygen supplementation.  It was felt that her hypoxemia may well have been more related to acute on chronic CHF.  Cardiology was consulted.  Ultimately, given the patient's overall cognitive and functional decline it was decided to transition to comfort cares with hospice as discussed below.  On the day of discharge she had no observed cough, remained quite lethargic and aphasic but was able to answer some simple questions with yes, no answers.     #  Acute toxic encephalopathy  # Baseline aphasia with progressive cognitive and functional decline:   Has prior history of acute encephalopathy in the setting of infections.    Recent h/o acute cystitis w/ group B strep and Pseudomonas for which she was hospitalized 7/16 - 7/29/24.  D/c'd to TCU where she stayed for a month. Has baseline aphasia, worse since discharge from TCU 3 days prior to this admit. CODE STROKE was activated in the ED and was seen by Neurology consult service.  CT head w/o contrast in the ED without acute findings. Pt was not cooperative and they had to tape her to the table to obtain CT. Has contrast allergy thus CTA was not done. MRI/MAR brain and carotids ordered but pt refused to undergo imaging studies.  She also had been refusing to take her oral meds.  It was felt that she would require intubation in order to undergo MRI.  Neurology consult service no longer recommending MRI.   Pt's presentation felt not consistent with a large thrombotic event.  Per discussion with neurology pt was not a candidate for tPA.  PT/OT/SLP were consulted.  Patient was continued on PTA aspirin and Lipitor.  Unfortunately patient was unable to cooperate or participate with therapy or  speech therapy.  Had also frequently been refusing to take anything by mouth.  Given patient's progressive cognitive and functional decline, palliative care was consulted and met with patient's [facility] who had been Mariel's caregiver at home for many years had witnessed Mariel's cognitive and functional decline.  As a result of her progressive decline she was no longer a candidate to return home and hospice was discussed which solution was in favor of.  Based on those chair discussions, patient was transitioned to comfort care with plans to enroll in hospice.  ---   Continue PTA ASA and Atorvastatin   ---   Pt refused to participate in PT or SLP.  Currently on FLD per SLP rec.  Pt has been refusing to take anything by mouth.     #  Chronic HFrEF w/ acute exacerbation   BNP elevated to 7,631 and CXR w/ pulm edema   TTE 9/3 revealed severely reduced EF 15-20%, was EF 20-25% in 7/2024  Cardiology was consulted, as was palliative care as discussed above.  Subsequently transition to comfort cares with hospice enrollment.  No discernible edema or volume overload on exam.  Oxygenating well on room air.  ---   Continue PTA Bumex 4 mg daily  ---   Discontinue Bumex 4 mg iv bid  ---   Continue PTA Metoprolol succinate 25mg daily  ---   Continue PTA losartan 25mg daily  ---   Patient is now on comfort/hospice care measures only     #  CAD s/p CABG X 3V  #  Troponinemia  CAD s/p PCI x 2 to LAD and CABG X 3V in 2018.   -Transition to comfort cares on hospice as above     #  T2DM  Poor nutritional intake in the setting of progressive cognitive and functional decline.  Goal is to avoid hypoglycemia, severe hyperglycemia in the setting of comfort cares.     #  Hypothyroidism  -Continue PTA levothyroxine 150mcg     #  CKD3  Baseline Cr 1.3 - 1.6  As above, transition to comfort cares on hospice.  Remain on Bumex for the purposes of patient comfort, avoiding volume overload and dyspnea.  No plans for further lab draws.        #   Functional urinary incontinence  -Purewick placed during hospitalization for comfort     #  Right buttock and posterior thigh wound  Pressure Injury Stage: 2, present on admission   Appreciate WOCN rec  -Daily:  OK to leave open to air. If posterior right thigh wound drains, apply mepilex and change every 3 days.  -Continue preventative sacral mepilex.           Consultations This Hospital Stay   SPEECH LANGUAGE PATH ADULT IP CONSULT  NEUROLOGY GENERAL ADULT IP CONSULT  PHYSICAL THERAPY ADULT IP CONSULT  WOUND OSTOMY CONTINENCE NURSE  IP CONSULT  OCCUPATIONAL THERAPY ADULT IP CONSULT  CARDIOLOGY HEART FAILURE (HF) IP CONSULT  PHYSICAL THERAPY ADULT IP CONSULT  CORE CLINIC EVALUATION IP CONSULT  CARDIOLOGY HEART FAILURE (HF) IP CONSULT  CORE CLINIC EVALUATION IP CONSULT  CARDIOLOGY HEART FAILURE (HF) IP CONSULT  CARDIOLOGY GENERAL ADULT IP CONSULT  CARE MANAGEMENT / SOCIAL WORK IP CONSULT  PALLIATIVE CARE ADULT IP CONSULT  WOUND OSTOMY CONTINENCE NURSE  IP CONSULT    Code Status   No CPR- Do NOT Intubate    Time Spent on this Encounter   I, Ramon Norris MD, personally saw the patient today and spent greater than 30 minutes discharging this patient.       Ramon Norris MD  [facility]  [address] [place] [id number]  Phone: [phone]  Fax: [phone]  ______________________________________________________________________    Physical Exam   Vital Signs: Temp: (!) 96  F (35.6  C) Temp src: Axillary BP: (!) 140/122 Pulse: 73   Resp: 16 SpO2: 96 % O2 Device: None (Room air)    Weight: 282 lbs 0 oz  General: Appears somewhat lethargic, lying in bed but comfortable.  Aphasic but is able to answer some simple yes, no questions with questionable accuracy.  HEENT: OP dry, clear  Chest: CTA anteriorly  CV: RRR  Abdomen: Morbidly obese.  Soft, nondistended.  No perceptible tenderness to deep palpation.  No rebound or guarding.  : No intertrigo.  Pure wick  catheter in place  Extremities: No edema in the legs or sacrum.  Feet warm, clean.           Primary Care Physician   Malika Wolf    Discharge Orders      Primary Care - Care Coordination Referral      Reason for your hospital stay    Progressive functional and cognitive decline, accelerated by COVID 19. Now on comfort cares, enrolling in hospice     Activity    Your activity upon discharge: up with assistance     Adult [facility] Follow-up and recommended labs and tests    Follow up with primary care provider, Malika Wolf, within 7 days for hospital follow- up.  No follow up labs or test are needed.      Appointments on [place] and/or [facility] (with [facility] or [facility] provider or service). Call [phone] if you haven't heard regarding these appointments within 7 days of discharge.     CPAP - Continue Home CPAP    Continue Home CPAP per home equipment settings.     Wound care and dressings    Right upper thigh under pannus: Daily  Ensure purewick tubing is not trapped under pannus.   If draining ok to apply mepilex and change every 3 days.     Monitor and record    Blood glucose: every morning before breakfast.     No CPR- Do NOT Intubate    Comfort care     Diet    Follow this diet upon discharge: Regular Diet Adult       Significant Results and Procedures   Most Recent 3 CBC's:  Recent Labs   Lab Test 09/08/24  1137 09/04/24  0522 09/03/24  0654 09/02/24  1347   WBC  --  3.9* 6.3 7.6   HGB  --  11.8 12.9 12.1   MCV  --  87 90 91    138* 159 174     Most Recent 3 BMP's:  Recent Labs   Lab Test 09/10/24  0841 09/10/24  0213 09/09/24  2225 09/04/24  0836 09/04/24  0522 09/03/24  0904 09/03/24  0654 09/02/24  2302   NA  --   --   --   --  144  --  146* 143   POTASSIUM  --   --   --   --  4.5  --  3.9 3.6   CHLORIDE  --   --   --   --  106  --  104 102   CO2  --   --   --   --  25  --  29 27   BUN  --   --   --   --  33.6*  --  26.6* 26.1*   CR  --   --   --   --  1.19*  --  1.29* 1.27*   ANIONGAP  --    --   --   --  13  --  13 14   ANTOINETTE  --   --   --   --  9.0  --  9.5 9.5   * 211* 227*   < > 149*   < > 157* 150*    < > = values in this interval not displayed.     Most Recent 2 LFT's:  Recent Labs   Lab Test 09/03/24  0654 09/02/24  1347   AST 31 33   ALT 24 15   ALKPHOS 127 125   BILITOTAL 0.6 0.6   ,   Results for orders placed or performed during the hospital encounter of 09/02/24   XR Chest Port 1 View    Narrative    EXAM: XR CHEST PORT 1 VIEW 9/2/2024 12:44 PM    INDICATION: AMS, coarse breath sounds    COMPARISON: Chest radiograph 7/16/2024, 7/12/2022, CT 11/12/2019    TECHNIQUE: Single portable semiupright AP view of the chest.    FINDINGS:   Postsurgical changes of CABG with sternotomy wires intact. Scattered  mediastinal and paratracheal surgical clips in stable position.  Convexity of the AP window compatible with pulmonary artery  enlargement. Mediastinal fullness in the superior left paratracheal  region just above the aortic knob. Bandlike opacity right lateral of  the mid thoracic trachea, compatible with accessory azygos  fissure/lobe seen on prior CT. Diffuse interstitial opacities  bilaterally. No evidence of focal consolidation, pneumothorax or  pleural effusion.      Impression    IMPRESSION:   1. No acute airspace disease. Diffuse pulmonary edema versus  atelectasis not significantly changed since 7/16/2024.    I have personally reviewed the examination and initial interpretation  and I agree with the findings.    ASHLYN HIGH MD         SYSTEM ID:  [id number]   CT Head w/o Contrast    Narrative    CT HEAD W/O CONTRAST 9/2/2024 5:31 PM    History:  stroke  ICD-10:     Comparison: CT head 7/16/2024     Technique: Using multidetector thin collimation helical acquisition  technique, axial, coronal and sagittal CT images from the skull base  to the vertex were obtained without intravenous contrast.     Findings: There is no intracranial hemorrhage, mass effect or midline  shift. There  is no focal loss of gray-white matter differentiation,  insular ribbon sign, or focally hyperdense artery to suggest acute  infarction periventricular and subcortical white matter  hypoattenuation is nonspecific, but not significantly changed compared  to 2024. Mild generalized cerebral atrophy.. The ventricles are  proportionate to the cerebral sulci and unchanged compared to prior.    The bony calvaria and the bones of the skull base appear normal. The  visualized portions of the paranasal sinuses and mastoid air cells are  unremarkable.       Impression    Impression:   1. No acute intracranial hemorrhage.  2. Mild Leukoaraiosis and mild generalized cerebral atrophy.  3. ASPECT Score: 10/10.    [Stroke Code Result: Negative]    Finding was identified on 2024 6:09 PM.     Jostin Muñoz was contacted by me on 2024 6:09 PM and verbalized  understanding of the result.    [facility] Stroke Code Communication Guidelines:  [place] ED: [phone] (EB ED)  [facility] Inpatient: [phone] (Resident Pager)   or [facility] 'Stroke First Call Resident [[facility]]' or [facility]  [facility] ED: [phone] ([facility] ED)  [facility] Inpatient: Page [phone]    BRUCE MICHEL MD         SYSTEM ID:  [id number]   Echo Limited     Value    LVEF  15-20% (severely reduced)    Narrative    [id number]  [id number]  [id number]  [id number]^ANUSHA^NOÉ^MYNOR     [facility]  Echocardiography Laboratory  [address] [place] [id number]     Name: ROSY PALMER  MRN: [id number]  : 1945  Study Date: 2024 03:04 PM  Age: 79 yrs  Gender: Female  Patient Location: [facility]  Reason For Study: CHF  Ordering Physician: NOÉ TAVARES  Performed By: Ivett Fink     BSA: 2.3 m2  Height: 66 in  Weight: 275 lb  HR: 94  BP: 160/99 mmHg  ______________________________________________________________________________  Procedure  Limited Portable Echo  Adult.  ______________________________________________________________________________  Interpretation Summary  Left ventricular function is decreased. The ejection fraction is 15-20%  (severely reduced).  Moderate left ventricular dilation is present.  The right ventricle is normal size.  Global right ventricular function cannot be assessed, RV in subcostal view  appear moderately reduced.     This study was compared with the study from 7/24/2024 .No significant changes  noted (RV inferior wall function appear low in the previous study).  ______________________________________________________________________________  Left Ventricle  Moderate left ventricular dilation is present. Left ventricular function is  decreased. The ejection fraction is 15-20% (severely reduced). Severe diffuse  hypokinesis is present.     Right Ventricle  The right ventricle is normal size. Global right ventricular function is  moderately reduced.     Mitral Valve  The mitral valve is normal. Trace mitral insufficiency is present.     Aortic Valve  Trace aortic insufficiency is present. No aortic stenosis is present.     Pericardium  No pericardial effusion is present.     Compared to Previous Study  This study was compared with the study from 7/24/2024 . No significant changes  noted.  ______________________________________________________________________________  MMode/2D Measurements & Calculations     EF(MOD-bp): 22.0 %     ______________________________________________________________________________  Report approved by: Aly CHAPMAN 09/03/2024 04:11 PM           *Note: Due to a large number of results and/or encounters for the requested time period, some results have not been displayed. A complete set of results can be found in Results Review.       Discharge Medications   Current Discharge Medication List        START taking these medications    Details   acetaminophen (TYLENOL) 650 MG suppository Place 1 suppository (650 mg)  rectally every 4 hours as needed for mild pain, other or fever (and adjunct with moderate or severe pain or per patient request). Use if unable to take oral  Qty: 30 suppository, Refills: 0    Associated Diagnoses: Pain      atropine 1 % ophthalmic solution Place 1-2 drops under the tongue every 2 hours as needed for secretions.  Qty: 5 mL, Refills: 0    Associated Diagnoses: Copious oral secretions      bisacodyl (DULCOLAX) 10 MG suppository Place 1 suppository (10 mg) rectally daily as needed for constipation.  Qty: 10 suppository, Refills: 0    Associated Diagnoses: Slow transit constipation      haloperidol (HALDOL) 2 MG/ML (HIGH CONC) solution Take 0.5 mLs (1 mg) by mouth every 6 hours as needed for agitation (nausea, delirium).  Qty: 15 mL, Refills: 0    Associated Diagnoses: Agitation      !! HYDROmorphone, STANDARD CONC, (DILAUDID) 1 MG/ML oral solution Take 2 mLs (2 mg) by mouth every 4 hours as needed for moderate pain.  Qty: 30 mL, Refills: 0    Associated Diagnoses: Pain      !! HYDROmorphone, STANDARD CONC, (DILAUDID) 1 MG/ML oral solution Take 4 mLs (4 mg) by mouth every 4 hours as needed for severe pain.  Qty: 30 mL, Refills: 0    Associated Diagnoses: Pain      LORazepam (ATIVAN) 2 MG/ML (HIGH CONC) oral solution Take 0.25 mLs (0.5 mg) by mouth every 3 hours as needed for anxiety (restlessness).  Qty: 10 mL, Refills: 0    Associated Diagnoses: Anxiety      ondansetron (ZOFRAN ODT) 4 MG ODT tab Take 1 tablet (4 mg) by mouth every 6 hours as needed for nausea or vomiting.  Qty: 30 tablet, Refills: 0    Associated Diagnoses: Nausea      senna-docusate (SENOKOT-S/PERICOLACE) 8.6-50 MG tablet Take 1 tablet by mouth 2 times daily as needed for constipation.  Qty: 30 tablet, Refills: 0    Associated Diagnoses: Slow transit constipation       !! - Potential duplicate medications found. Please discuss with provider.        CONTINUE these medications which have CHANGED    Details   acetaminophen (TYLENOL) 325  MG tablet Take 2 tablets (650 mg) by mouth every 4 hours as needed for mild pain or other (and adjunct with moderate or severe pain or per patient request).  Qty: 30 tablet, Refills: 0    Associated Diagnoses: Pain      albuterol (PROAIR HFA) 108 (90 Base) MCG/ACT inhaler Inhale 2 puffs into the lungs every 6 hours as needed for shortness of breath or wheezing.  Qty: 18 g, Refills: 0    Comments: Pharmacy may dispense brand covered by insurance (Proair, or proventil or ventolin or generic albuterol inhaler)  Associated Diagnoses: Chronic obstructive pulmonary disease, unspecified COPD type (H)      anastrozole (ARIMIDEX) 1 MG tablet Take 1 tablet (1 mg) by mouth daily.  Qty: 30 tablet, Refills: 0    Associated Diagnoses: Ductal carcinoma in situ (DCIS) of left breast      aspirin 81 MG EC tablet Take 1 tablet (81 mg) by mouth daily.  Qty: 30 tablet, Refills: 0    Associated Diagnoses: Chronic systolic congestive heart failure (H)      atorvastatin (LIPITOR) 40 MG tablet TAKE 1 TABLET(40 MG) BY MOUTH IN THE MORNING  Qty: 30 tablet, Refills: 0    Associated Diagnoses: Atherosclerosis of native coronary artery without angina pectoris, unspecified whether native or transplanted heart      bumetanide (BUMEX) 1 MG tablet Take 4 tablets (4 mg) by mouth daily. Hold for SBP < 100 mmHg  Qty: 120 tablet, Refills: 0    Associated Diagnoses: Chronic systolic congestive heart failure (H)      colchicine (COLCRYS) 0.6 MG tablet Take 1 tablet (0.6 mg) by mouth daily as needed for gout pain.  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 To right wrist  Qty: 50 g, Refills: 0    Associated Diagnoses: Idiopathic chronic gout of multiple sites without tophus      febuxostat (ULORIC) 80 MG TABS tablet Take 1 tablet (80 mg) by mouth daily.  Qty: 30 tablet, Refills: 0     Associated Diagnoses: Idiopathic chronic gout of multiple sites without tophus      insulin glargine (LANTUS SOLOSTAR) 100 UNIT/ML pen Inject 5 Units subcutaneously at bedtime.  Qty: 3 mL, Refills: 0    Comments: If Lantus is not covered by insurance, may substitute Basaglar or Semglee or other insulin glargine product per insurance preference at same dose and frequency.    Associated Diagnoses: Type 2 diabetes mellitus with stage 4 chronic kidney disease, with long-term current use of insulin (H)      levothyroxine (SYNTHROID) 150 MCG tablet Take 1 tablet (150 mcg) by mouth daily.  Qty: 30 tablet, Refills: 0    Associated Diagnoses: Other specified hypothyroidism      losartan (COZAAR) 25 MG tablet Take 1 tablet (25 mg) by mouth daily. Hold for SBP < 100 mmHg  Qty: 30 tablet, Refills: 0    Associated Diagnoses: Chronic systolic congestive heart failure (H)      metoprolol succinate ER (TOPROL XL) 25 MG 24 hr tablet Take 1 tablet (25 mg) by mouth every evening.  Qty: 30 tablet, Refills: 0    Associated Diagnoses: Chronic systolic congestive heart failure (H)      miconazole (MICATIN) 2 % external powder Apply topically 2 times daily. Apply to groin twice daily  Qty: 43 g, Refills: 0    Associated Diagnoses: Intertrigo      mirabegron (MYRBETRIQ) 25 MG 24 hr tablet Take 1 tablet (25 mg) by mouth daily.  Qty: 30 tablet, Refills: 0    Associated Diagnoses: Urinary frequency      nystatin (MYCOSTATIN) 115208 UNIT/GM external ointment Apply topically 2 times daily. Apply to vagina  Qty: 15 g, Refills: 0    Associated Diagnoses: Intertrigo      polyethylene glycol (MIRALAX) 17 GM/Dose powder Take 17 g by mouth 2 times daily as needed for constipation.  Qty: 116 g, Refills: 0    Associated Diagnoses: Slow transit constipation      pregabalin (LYRICA) 100 MG capsule Take 1 capsule (100 mg) by mouth 2 times daily.  Qty: 30 capsule, Refills: 0    Associated Diagnoses: Pain in joint of left shoulder      venlafaxine (EFFEXOR  XR) 150 MG 24 hr capsule Take 1 capsule (150 mg) by mouth daily.  Qty: 30 capsule, Refills: 0    Associated Diagnoses: Mild episode of recurrent major depressive disorder (H24)           CONTINUE these medications which have NOT CHANGED    Details   EPINEPHrine (ANY BX GENERIC EQUIV) 0.3 MG/0.3ML injection 2-pack Inject 0.3 mLs (0.3 mg) into the muscle as needed for anaphylaxis May repeat one time in 5-15 minutes if response to initial dose is inadequate.  Qty: 2 each, Refills: 1    Associated Diagnoses: Anaphylactic reaction to bee sting, undetermined intent, sequela      Continuous Blood Gluc  (FREESTYLE MARTY 3 READER) JEZ 1 each once for 1 dose Use to read blood sugars per 's instructions.  Qty: 1 each, Refills: 0    Associated Diagnoses: Type 2 diabetes mellitus with stage 3b chronic kidney disease, with long-term current use of insulin (H)      Continuous Blood Gluc Sensor (FREESTYLE MARTY 3 SENSOR) MISC 1 each every 14 days Use 1 sensor every 14 days. Use to read blood sugars per 's instructions.  Qty: 6 each, Refills: 5    Associated Diagnoses: Type 2 diabetes mellitus with stage 3b chronic kidney disease, with long-term current use of insulin (H)           STOP taking these medications       ferrous gluconate (FERGON) 324 (38 Fe) MG tablet Comments:   Reason for Stopping:         nitroGLYcerin (NITROSTAT) 0.4 MG sublingual tablet Comments:   Reason for Stopping:         nystatin POWD Comments:   Reason for Stopping:         potassium chloride malcolm ER (KLOR-CON M10) 10 MEQ CR tablet Comments:   Reason for Stopping:             Allergies   Allergies   Allergen Reactions    Contrast Dye Anaphylaxis    Fish Allergy Anaphylaxis    Iodine Anaphylaxis    Oxycodone Other (See Comments)     Severe suicidal tendencies on this medication    Tree Nuts [Nuts] Anaphylaxis     Tree nuts only (peanuts ok)    Bactrim      Increased uric acid    Betadine [Povidone Iodine] Hives, Swelling and  Difficulty breathing     Betadine    Combivent      Rash    Dulaglutide Other (See Comments)     Hx . Of thyroid cancer.    Fish Oil     Lisinopril Other (See Comments)     Scr/grf severely reduced.     Penicillins Rash    Allopurinol Rash    Latex Rash    Wool Fiber Rash

## 2024-09-10 NOTE — PLAN OF CARE
Goal Outcome Evaluation:      Overall Patient Progress: no change    Pt sleeping well all noc. Non-verbal and does not use call-light. Nursing has to anticipate needs. 2 to turn and reposition q 2-3 hrs. Pure///wick in place. Pt instinctively resists cares and stiffens and does not follow directions most of the time. Nursing is assisting as needed      Sofía Mcmanus RN

## 2024-09-11 NOTE — PROGRESS NOTES
Clinic Care Coordination Contact  Care Coordination Clinician Chart Review    Situation: Patient chart reviewed by care coordinator.    Background: Clinic Care Coordination Referral received from inpatient care team for transition handoff communication following hospital admission.    Assessment: Upon chart review, patient is not a candidate for Primary Care Clinic Care Coordination enrollment due to reason stated below:  Patient enrolled into hospice.    Plan/Recommendations: Clinic Care Coordination Referral/order cancelled. RN/SW CC will perform no further monitoring/outreaches at this time and will remain available as needed. If new needs arise, a new Care Coordination Referral may be placed.    SONIA ZapataN, RN, PHN   Care Coordinator-Ambulatory Care Management  [facility] and [facility]  [phone]

## 2024-09-17 NOTE — TELEPHONE ENCOUNTER
Forms/Letter Request    Type of form/letter: OTHER: Hospice standing orders, POLST       Do we have the form/letter: Yes: Placed in care team 2    Who is the form from? Home care    Where did/will the form come from? form was faxed in    When is form/letter needed by: as soon as able    How would you like the form/letter returned: Fax : [phone]

## 2024-09-19 NOTE — TELEPHONE ENCOUNTER
Faxed to [phone]  Copy sent to abstract  Copy kept in clinic  POLST sent to [facility] via [facility] staff

## 2024-09-26 NOTE — PROGRESS NOTES
Called [facility] to check in. Noted Mariel recently enrolled in hospice and is at a facility.   We talked about role of cardiomems and that its not needed anymore, hospice will manage things with a more comfort based approach.   We talked about the role of heart failure clinic and other specialty clinics and advised that typically, hospice becomes the primary manager of the patient and that specialty clinics aren't needed.   She has a video appointment next week with Dr. Dominguez that [facility] would like to keep for now, as she has many questions about the decline in her EF and what things are going to look like.   I did let Odalis know that most often, clinic visits aren't covered by insurance once patients are enrolled in hospice. She can keep that appointment next week if she wants to, but I advised her to talk to the hospice team and check with insurance prior to that.   She did state it might be worth it to her to pay out of pocket for it just to get some of her questions addressed.   Told Odalis I would deactivate the cardiomems in our system, and they do not need to return the mems equipment, but if they want help with getting rid of it I can get them the [employer] phone number.   Will update provider.
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
## 2024-10-01 NOTE — PATIENT INSTRUCTIONS
Cardiology Providers you saw during your visit:  Dr Dominguez     Medication changes:  1- none        Follow up:  1- As needed- virtual okay        Please call if you have:  1. Weight gain of more than 2 pounds in a day or 5 pounds in a week  2. Increased shortness of breath, swelling or bloating  3. Dizziness, lightheadedness   4. Any questions or concerns.      Heart Failure Support Group  Virtual meetings will continue in 2024 Please reach out if you would like to attend and we can get you the information you need to log in.     2024 dates:    Monday, May 6th, 1-2pm     Monday, June 3rd, 1-2pm     Monday, July 1st, 1-2pm     Monday, August 5th, 1-2pm     Monday, September 9th, 1-2pm     Monday, October 7th, 1-2pm     Monday, November 4th, 1-2pm     Monday, December 2nd, 1-2pm     Follow the American Heart Association Diet and Lifestyle recommendations:  Limit saturated fat, trans fat, sodium, red meat, sweets and sugar-sweetened beverages. If you choose to eat red meat, compare labels and select the leanest cuts available.  Aim for at least 150 minutes of moderate physical activity or 75 minutes of vigorous physical activity - or an equal combination of both - each week.     During business hours: [phone], press option # 1 to schedule an appointment or send a message to your care team     After hours, weekends or holidays: On Call Cardiologist- [phone]   option #4 and ask to speak to the on-call Cardiologist. Inform them you are a CORE/heart failure patient at the [facility].     JODY Garcia

## 2024-10-01 NOTE — NURSING NOTE
Current patient location:  [facility]    Is the patient currently in the state of [place]? YES    Visit mode:VIDEO    If the visit is dropped, the patient can be reconnected by: VIDEO VISIT: Text to cell phone:   Telephone Information:   Mobile [phone]    and VIDEO VISIT: Send to e-mail at: [email or web address]    Will anyone else be joining the visit? Only Odalis will be joining   (If patient encounters technical issues they should call [phone]    Are changes needed to the allergy or medication list? No    Odalis denies any changes and states that all information remains accurate since last reviewed/verified.     Are refills needed on medications prescribed by this physician? NO    Rooming Documentation:  Questionnaire(s) not pre-assigned    Reason for visit: SUZIE Coello MA VVF

## 2024-10-11 ENCOUNTER — TELEPHONE (OUTPATIENT)
Dept: FAMILY MEDICINE | Facility: CLINIC | Age: 79
End: 2024-10-11
Payer: MEDICARE

## 2024-10-11 NOTE — TELEPHONE ENCOUNTER
Home calling as they need a clinician to provide cause of death. See 24 mycjodie encounter her care was transferred to [facility] so Malika Wolf CNP will not be the one to provide cause of death.     Assisted  home contact in looking up [facility] contact info and they will reach out to them for this.     Monica Barakat RN  [facility]

## 2024-10-13 ENCOUNTER — HEALTH MAINTENANCE LETTER (OUTPATIENT)
Age: 79
End: 2024-10-13

## 2024-10-15 NOTE — PROGRESS NOTES
Removing from active [facility] billing list. Patient has now enrolled in hospice.   Non-billable encounter

## 2024-10-29 ENCOUNTER — PATIENT OUTREACH (OUTPATIENT)
Dept: ONCOLOGY | Facility: CLINIC | Age: 79
End: 2024-10-29
Payer: MEDICARE

## 2024-10-29 NOTE — PROGRESS NOTES
[facility]: Cancer Care                                                                                         Completed chart audit to assign Oncology Care Coordination enrollment status.      Mirian Cobos MSN, RN, OCN   RN Care Coordinator   [facility]

## 2024-12-17 NOTE — PROGRESS NOTES
Cycle 1, Day 1 Follow-Up Note     Diagnosis: B-cell lymphoma     Regimen:  R-CHOP EVERY 21 DAYS X 4 CYCLES     The patient received C1D1 on the following day/dates 12/17/2024.    Patient answered the phone: Yes, Nursing Assessment:  A nursing assessment addressing the the CMS OP35 \"Call Us First\" descriptors was performed over the phone and the patient reports the following:    Shortness of breath with activity no  Shortness of breath at rest no  Fatigue no  Nausea no  Vomiting no  Diarrhea no  Dry mouth/lips no  Decreased oral intake no  Decreased urine output no  Fever or chills no  Cough productive no  Dry cough no  Pain/Chest pain No    Patient needing additional appointment: No, next scheduled appointment is: 12/20/2024 and reviewed with patient.    Education: No new instructions needed    Patient instructed to call the office with any questions or concerns.    Patient verbalized understanding to \"Call Us First\" to report new or worsening symptoms: YES              [facility] - CardioMEMS Reading Review    June 7, 2023   CardioMEMS reviewed.  Current diuretic: Bumex 2 mg daily  Recent plan of care changes: Seen in clinic last week. Bumex decreased to 2 mg daily and started Farxiga.     Current Threshold parameters:         Today's Waveform:       Readings:             Rosina Mays RN

## 2025-02-04 NOTE — PROGRESS NOTES
[facility] - CardioMEMS Reading Review    June 6, 2024   CardioMEMS reviewed.  Current diuretic: Bumex 4 mg daily  Recent plan of care changes: none. Last PAD was high, but hasn't sent readings for several days. Mychart sent to check in     Current Threshold parameters:         Today's Waveform:       Readings:               Rosina Mays RN                 
Reviewed with Austin Adams NP and message sent with following recommendations:    Date: 6/6/2024    Time of Call: 4:31 PM     Diagnosis:  HF     [ TORB ] Ordering provider: Austin Adams NP  Order: increase bumex to 5 mg daily x 2 days      Order received by: Rosina Mays RN      Follow-up/additional notes:      
Updated mems reading sent and was elevated at 8.   Will review with provider.       
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 Found to have infected ankle wound, on antibiotics. Recommended to pursue orthopedic surgery and MADELEINE, however patient is refusing both (as well as other aspects of care, such as SCDs and telemetry). She complains of pain, anxiety, and poor sleep. Appears inattentive and forgetful on exam, likely delirium.

## 2025-05-19 NOTE — TELEPHONE ENCOUNTER
DIAGNOSIS: Broken toe, Diabetic foot issues    APPOINTMENT DATE: 7/1/2022, 1:20 PM    NOTES STATUS DETAILS   OFFICE NOTE from other specialist Internal 2/8/2022 Office visit with CHANTAL Ramirez CNP ([facility])      MEDICATION LIST Internal    LABS     CBC/DIFF Internal 3/9/2022   XRAYS (IMAGES & REPORTS) Internal XR Foot Left: 3/18/2022       
done

## (undated) DEVICE — TRAY PAIN INJECTION 97A 640

## (undated) DEVICE — SUCTION CANISTER MEDIVAC LINER 1500ML W/LID 65651-515

## (undated) DEVICE — SU DERMABOND ADVANCED .7ML DNX12

## (undated) DEVICE — SOL NACL 0.9% 10ML VIAL 0409-4888-02

## (undated) DEVICE — SU STEEL 4 CCS 4X18" M652G

## (undated) DEVICE — SYSTEM PANNUS RETENTION 4 PAD 2 STRAP CZ-PRS-04

## (undated) DEVICE — WIPES FOLEY CARE SURESTEP PROVON DFC100

## (undated) DEVICE — CLIP HORIZON MED BLUE 002200

## (undated) DEVICE — GLOVE PROTEXIS W/NEU-THERA 7.5  2D73TE75

## (undated) DEVICE — PREP CHLORAPREP 26ML TINTED ORANGE  260815

## (undated) DEVICE — SU STEEL MYO/WIRE II STERNOTOMY 8 BE-1 3X14" 048-217

## (undated) DEVICE — INTRO SHEATH 6FRX10CM PINNACLE RSS602

## (undated) DEVICE — SU PLEDGET SOFT TFE 3/8"X3/26"X1/16" PCP40

## (undated) DEVICE — NDL SPINAL 22GA 3.5" QUINCKE 405181

## (undated) DEVICE — ESU ELEC BLADE 2.75" COATED/INSULATED E1455

## (undated) DEVICE — SYR 03ML LL W/O NDL

## (undated) DEVICE — SU MONOCRYL 4-0 PS-2 27" UND Y426H

## (undated) DEVICE — SPONGE RAY-TEC 4X8" 7318

## (undated) DEVICE — Device

## (undated) DEVICE — SOL NACL 0.9% IRRIG 1000ML BOTTLE 2F7124

## (undated) DEVICE — DRAPE STERI INCISE 1050

## (undated) DEVICE — DRAIN CHEST TUBE RIGHT ANGLED 28FR 8128

## (undated) DEVICE — BONE WAX 2.5GM W31G

## (undated) DEVICE — DRSG PRIMAPORE 03 1/8X6" 66000318

## (undated) DEVICE — LIGHT HANDLE X1 31140133

## (undated) DEVICE — ENDO CANNULA 05MM VERSAONE UNIVERSAL UNVCA5STF

## (undated) DEVICE — SU VICRYL 0 UR-6 27" J603H

## (undated) DEVICE — BLADE KNIFE BEAVER MICROSHARP GREEN 377515

## (undated) DEVICE — SU ETHIBOND 3-0 BBDA 36" X588H

## (undated) DEVICE — ESU PENCIL W/COATED BLADE E2450H

## (undated) DEVICE — SU PROLENE 5-0 RB-2DA 30" 8710H

## (undated) DEVICE — SU ETHIBOND 2-0 SHDA 30" X563H

## (undated) DEVICE — BLADE CLIPPER SGL USE 9680

## (undated) DEVICE — PREP CHLORAPREP W/ORANGE TINT 10.5ML 260715

## (undated) DEVICE — CONNECTOR DRAIN CHEST Y EXTENSION SET 19909

## (undated) DEVICE — DECANTER BAG 2002S

## (undated) DEVICE — TUBING INSUFFLATION W/FILTER CPC TO LUER 620-030-301

## (undated) DEVICE — SOL NACL 0.9% INJ 1000ML BAG 07983-09

## (undated) DEVICE — GLOVE PROTEXIS MICRO 6.0  2D73PM60

## (undated) DEVICE — ENDO TROCAR OPTICAL 12MM VERSAONE W/STD FIX CAN UNVCA12STF

## (undated) DEVICE — DEVICE FEMOSTOP COMPRESSION 11165

## (undated) DEVICE — INTRODUCER SHEATH 12FRX12CM FAST-CATH 406128

## (undated) DEVICE — COVER NEOPROBE SOFTFLEX 5X96" W/BANDS 20-PC596

## (undated) DEVICE — CANNULA VESSEL DLP  30001

## (undated) DEVICE — SU PLEDGET SOFT TFE 13MMX7MMX1.5MM D7044

## (undated) DEVICE — BNDG ESMARK 6" STERILE

## (undated) DEVICE — DRSG TELFA 3X8" 1238

## (undated) DEVICE — SU STEEL 6 CCS 4X18" M654G

## (undated) DEVICE — CATH ANGIO WEDGE PRESSURE 7FRX110CM DL AI-07127

## (undated) DEVICE — DEVICE CATH STABILIZATION STATLOCK FOLEY 3-WAY FOL0105

## (undated) DEVICE — SPONGE LAP 18X18" X8435

## (undated) DEVICE — ENDO TROCAR BLUNT 100MM W/THRD ANCHOR BLUNTPORT BPT12STS

## (undated) DEVICE — SUCTION DRY CHEST DRAIN OASIS 3600-100

## (undated) DEVICE — ANTIFOG SOLUTION W/FOAM PAD 31142527

## (undated) DEVICE — CLIP SPRING FOGARTY SOFTJAW CSOFT6

## (undated) DEVICE — SUCTION CATH AIRLIFE TRI-FLO W/CONTROL PORT 14FR  T60C

## (undated) DEVICE — BLOWER/MISTER CLEARVIEW 22150

## (undated) DEVICE — SU VICRYL 2-0 CT-1 27" UND J259H

## (undated) DEVICE — PACK MINOR EYE

## (undated) DEVICE — DRSG ABDOMINAL 07 1/2X8" 7197D

## (undated) DEVICE — TAPE MEDIPORE 4"X2YD 2864

## (undated) DEVICE — DILATOR VASCULAR 12FRX20CM G00995

## (undated) DEVICE — LINEN TOWEL PACK X6 WHITE 5487

## (undated) DEVICE — GLOVE PROTEXIS POWDER FREE SMT 7.5  2D72PT75X

## (undated) DEVICE — LINEN TOWEL PACK X30 5481

## (undated) DEVICE — CLIP HORIZON SM RED WIDE SLOT 001201

## (undated) DEVICE — SU PROLENE 4-0 RB-1DA 36" 8557H

## (undated) DEVICE — ENDO TROCAR 12MM VERSAONE BLADELESS W/STD FIX CAN NONB12STF

## (undated) DEVICE — SU PDS II 0 CT-1 27" Z340H

## (undated) DEVICE — SU PROLENE 7-0 BV-1DA 24" 8702H

## (undated) DEVICE — PACK HEART RIGHT CUSTOM SAN32RHF18

## (undated) DEVICE — SU PROLENE 4-0 SHDA 36" 8521H

## (undated) DEVICE — SU VICRYL 3-0 SH 27" J316H

## (undated) DEVICE — STPL SKIN 35W 059037

## (undated) DEVICE — BNDG ELASTIC 6"X5YDS STERILE 6611-6S

## (undated) DEVICE — BLADE SAW STERNAL 20X30MM KM-32

## (undated) DEVICE — GEL ULTRASOUND AQUASONIC 20GM 01-01

## (undated) DEVICE — NDL 30GA 0.5" 305106

## (undated) DEVICE — SYR 30ML LL W/O NDL 302832

## (undated) DEVICE — LEAD PACER MYOCARDIAL BIPOLAR TEMPORARY 53CM 6495F

## (undated) DEVICE — CLIP APPLIER ENDO 10MM M/L 176657

## (undated) DEVICE — CLIP HORIZON LG ORANGE 004200

## (undated) DEVICE — DRAPE SLUSH/WARMER 66X44" ORS-320

## (undated) DEVICE — GLOVE PROTEXIS POWDER FREE SMT 8.0  2D72PT80X

## (undated) DEVICE — SU VICRYL 0 CTX 36" J370H

## (undated) DEVICE — SU ETHIBOND 0 CT-1 CR 8X18" CX21D

## (undated) DEVICE — SOL WATER IRRIG 1000ML BOTTLE 07139-09

## (undated) DEVICE — GLOVE ESTEEM POWDER FREE 8.0  2D72PL80

## (undated) DEVICE — BLADE KNIFE BEAVER MINI STR BEAVER6900

## (undated) DEVICE — DRSG DRAIN 4X4" 7086

## (undated) DEVICE — SUCTION IRR STRYKERFLOW II W/TIP 250-070-520

## (undated) DEVICE — TUBING SUCTION 10'X3/16" N510

## (undated) DEVICE — WIRE GUIDE 0.018"X260CM PLATINUM PLUS SHORT TAPER M001466050

## (undated) DEVICE — CATH TRAY FOLEY SURESTEP 16FR W/URINE MTR STATLK LF A303416A

## (undated) DEVICE — STPL ENDO RELOAD GIA 45X3.5MM ROTIC 030455

## (undated) DEVICE — TOURNIQUET VASCULAR KIT 7 1/2" 79012

## (undated) DEVICE — TUBING SUCTION MEDI-VAC SOFT 3/16"X20' N520A

## (undated) DEVICE — TIES BANDING T50R

## (undated) DEVICE — SU VICRYL 3-0 SH 27" UND J416H

## (undated) DEVICE — PACK ADULT HEART UMMC PV15CG92D

## (undated) DEVICE — ESU GROUND PAD ADULT W/CORD E7507

## (undated) DEVICE — NDL SPINAL 22GA 5" QUINCKE 405148

## (undated) DEVICE — SUCTION MANIFOLD DORNOCH ULTRA CART UL-CL500

## (undated) DEVICE — SU ETHIBOND 0 TIE 6X30" X306H

## (undated) DEVICE — NDL INSUFFLATION 120MM VERRES 172015

## (undated) DEVICE — KIT ENDO VASOVIEW HEMOPRO 2 VH-4000

## (undated) DEVICE — SYR 01ML 27GA 0.5" NDL TBC 309623

## (undated) DEVICE — SOL NACL 0.9% IRRIG 3000ML BAG 2B7477

## (undated) DEVICE — KIT RIGHT HEART CATH H965601307191

## (undated) DEVICE — WIRE GUIDE 0.035"X150CM EMERALD J TIP 502521

## (undated) DEVICE — PROTECTOR ARM ONE-STEP TRENDELENBURG 40418

## (undated) DEVICE — LABEL MEDICATION SYSTEM 3303-P

## (undated) DEVICE — ENDO SHEARS 5MM 176643

## (undated) DEVICE — INTRO SHEATH 10FRX10CM PINNACLE RSS002

## (undated) DEVICE — DEVICE SUTURE GRASPER TROCAR CLOSURE 14GA PMITCSG

## (undated) DEVICE — COVER CAMERA IN-LIGHT DISP LT-C02

## (undated) DEVICE — ENDO TROCAR 05MM VERSAONE BLADELESS W/STD FIX CAN NONB5STF

## (undated) DEVICE — DRAIN CHEST TUBE 32FR STR 8032

## (undated) DEVICE — PUNCH AORTIC 4.0MM LONG AP-540

## (undated) DEVICE — INTRO SHEATH 8FRX10CM PINNACLE RSS802

## (undated) DEVICE — STPL ENDO HANDLE GIA ULTRA UNIVERSAL STD EGIAUSTND

## (undated) RX ORDER — PROTAMINE SULFATE 10 MG/ML
INJECTION, SOLUTION INTRAVENOUS
Status: DISPENSED
Start: 2018-06-26

## (undated) RX ORDER — PROTAMINE SULFATE 10 MG/ML
INJECTION, SOLUTION INTRAVENOUS
Status: DISPENSED
Start: 2018-07-02

## (undated) RX ORDER — ESMOLOL HYDROCHLORIDE 10 MG/ML
INJECTION INTRAVENOUS
Status: DISPENSED
Start: 2017-08-11

## (undated) RX ORDER — GABAPENTIN 300 MG/1
CAPSULE ORAL
Status: DISPENSED
Start: 2017-08-11

## (undated) RX ORDER — FENTANYL CITRATE 50 UG/ML
INJECTION, SOLUTION INTRAMUSCULAR; INTRAVENOUS
Status: DISPENSED
Start: 2018-06-26

## (undated) RX ORDER — KETAMINE HYDROCHLORIDE 10 MG/ML
INJECTION, SOLUTION INTRAMUSCULAR; INTRAVENOUS
Status: DISPENSED
Start: 2017-08-11

## (undated) RX ORDER — CEFAZOLIN SODIUM 1 G/3ML
INJECTION, POWDER, FOR SOLUTION INTRAMUSCULAR; INTRAVENOUS
Status: DISPENSED
Start: 2018-07-02

## (undated) RX ORDER — MILRINONE LACTATE 0.2 MG/ML
INJECTION, SOLUTION INTRAVENOUS
Status: DISPENSED
Start: 2018-07-02

## (undated) RX ORDER — PROPOFOL 10 MG/ML
INJECTION, EMULSION INTRAVENOUS
Status: DISPENSED
Start: 2018-07-02

## (undated) RX ORDER — LIDOCAINE HYDROCHLORIDE 10 MG/ML
INJECTION, SOLUTION EPIDURAL; INFILTRATION; INTRACAUDAL; PERINEURAL
Status: DISPENSED
Start: 2018-08-15

## (undated) RX ORDER — HYDRALAZINE HYDROCHLORIDE 20 MG/ML
INJECTION INTRAMUSCULAR; INTRAVENOUS
Status: DISPENSED
Start: 2017-08-11

## (undated) RX ORDER — FENTANYL CITRATE 50 UG/ML
INJECTION, SOLUTION INTRAMUSCULAR; INTRAVENOUS
Status: DISPENSED
Start: 2018-07-02

## (undated) RX ORDER — NITROGLYCERIN 5 MG/ML
VIAL (ML) INTRAVENOUS
Status: DISPENSED
Start: 2018-06-26

## (undated) RX ORDER — HYDRALAZINE HYDROCHLORIDE 20 MG/ML
INJECTION INTRAMUSCULAR; INTRAVENOUS
Status: DISPENSED
Start: 2019-07-01

## (undated) RX ORDER — ALBUMIN, HUMAN INJ 5% 5 %
SOLUTION INTRAVENOUS
Status: DISPENSED
Start: 2018-07-02

## (undated) RX ORDER — SODIUM CHLORIDE 9 MG/ML
INJECTION, SOLUTION INTRAVENOUS
Status: DISPENSED
Start: 2018-06-26

## (undated) RX ORDER — LIDOCAINE 40 MG/G
CREAM TOPICAL
Status: DISPENSED
Start: 2019-06-13

## (undated) RX ORDER — ETOMIDATE 2 MG/ML
INJECTION INTRAVENOUS
Status: DISPENSED
Start: 2018-07-02

## (undated) RX ORDER — LIDOCAINE HYDROCHLORIDE 10 MG/ML
INJECTION, SOLUTION EPIDURAL; INFILTRATION; INTRACAUDAL; PERINEURAL
Status: DISPENSED
Start: 2018-08-16

## (undated) RX ORDER — FENTANYL CITRATE 50 UG/ML
INJECTION, SOLUTION INTRAMUSCULAR; INTRAVENOUS
Status: DISPENSED
Start: 2017-08-11

## (undated) RX ORDER — CALCIUM CHLORIDE 100 MG/ML
INJECTION INTRAVENOUS; INTRAVENTRICULAR
Status: DISPENSED
Start: 2018-07-02

## (undated) RX ORDER — SODIUM CHLORIDE 9 MG/ML
INJECTION, SOLUTION INTRAVENOUS
Status: DISPENSED
Start: 2019-06-13

## (undated) RX ORDER — HEPARIN SODIUM 1000 [USP'U]/ML
INJECTION, SOLUTION INTRAVENOUS; SUBCUTANEOUS
Status: DISPENSED
Start: 2018-06-26

## (undated) RX ORDER — REGADENOSON 0.08 MG/ML
INJECTION, SOLUTION INTRAVENOUS
Status: DISPENSED
Start: 2017-11-08

## (undated) RX ORDER — NICARDIPINE HYDROCHLORIDE 2.5 MG/ML
INJECTION INTRAVENOUS
Status: DISPENSED
Start: 2018-06-26

## (undated) RX ORDER — DIPHENHYDRAMINE HYDROCHLORIDE 50 MG/ML
INJECTION INTRAMUSCULAR; INTRAVENOUS
Status: DISPENSED
Start: 2018-06-26

## (undated) RX ORDER — EPHEDRINE SULFATE 50 MG/ML
INJECTION, SOLUTION INTRAMUSCULAR; INTRAVENOUS; SUBCUTANEOUS
Status: DISPENSED
Start: 2018-07-02

## (undated) RX ORDER — LIDOCAINE HYDROCHLORIDE 10 MG/ML
INJECTION, SOLUTION EPIDURAL; INFILTRATION; INTRACAUDAL; PERINEURAL
Status: DISPENSED
Start: 2018-07-08

## (undated) RX ORDER — HEPARIN SODIUM 1000 [USP'U]/ML
INJECTION, SOLUTION INTRAVENOUS; SUBCUTANEOUS
Status: DISPENSED
Start: 2018-07-02

## (undated) RX ORDER — HYDRALAZINE HYDROCHLORIDE 20 MG/ML
INJECTION INTRAMUSCULAR; INTRAVENOUS
Status: DISPENSED
Start: 2018-06-26

## (undated) RX ORDER — DIPHENHYDRAMINE HCL 25 MG
CAPSULE ORAL
Status: DISPENSED
Start: 2022-01-28

## (undated) RX ORDER — LIDOCAINE HYDROCHLORIDE 10 MG/ML
INJECTION, SOLUTION EPIDURAL; INFILTRATION; INTRACAUDAL; PERINEURAL
Status: DISPENSED
Start: 2018-06-26

## (undated) RX ORDER — PHENYLEPHRINE HCL IN 0.9% NACL 1 MG/10 ML
SYRINGE (ML) INTRAVENOUS
Status: DISPENSED
Start: 2018-07-02

## (undated) RX ORDER — ACETAMINOPHEN 325 MG/1
TABLET ORAL
Status: DISPENSED
Start: 2017-08-11

## (undated) RX ORDER — METHYLPREDNISOLONE ACETATE 40 MG/ML
INJECTION, SUSPENSION INTRA-ARTICULAR; INTRALESIONAL; INTRAMUSCULAR; SOFT TISSUE
Status: DISPENSED
Start: 2018-08-15

## (undated) RX ORDER — LIDOCAINE 40 MG/G
CREAM TOPICAL
Status: DISPENSED
Start: 2019-07-01

## (undated) RX ORDER — METHYLPREDNISOLONE SODIUM SUCCINATE 125 MG/2ML
INJECTION, POWDER, LYOPHILIZED, FOR SOLUTION INTRAMUSCULAR; INTRAVENOUS
Status: DISPENSED
Start: 2018-06-26

## (undated) RX ORDER — ONDANSETRON 2 MG/ML
INJECTION INTRAMUSCULAR; INTRAVENOUS
Status: DISPENSED
Start: 2018-07-02

## (undated) RX ORDER — METHYLPREDNISOLONE SODIUM SUCCINATE 125 MG/2ML
INJECTION, POWDER, LYOPHILIZED, FOR SOLUTION INTRAMUSCULAR; INTRAVENOUS
Status: DISPENSED
Start: 2019-07-01

## (undated) RX ORDER — ONDANSETRON 2 MG/ML
INJECTION INTRAMUSCULAR; INTRAVENOUS
Status: DISPENSED
Start: 2017-08-11

## (undated) RX ORDER — GLYCOPYRROLATE 0.2 MG/ML
INJECTION, SOLUTION INTRAMUSCULAR; INTRAVENOUS
Status: DISPENSED
Start: 2018-07-02